# Patient Record
Sex: FEMALE | Race: WHITE | NOT HISPANIC OR LATINO | Employment: OTHER | URBAN - METROPOLITAN AREA
[De-identification: names, ages, dates, MRNs, and addresses within clinical notes are randomized per-mention and may not be internally consistent; named-entity substitution may affect disease eponyms.]

---

## 2017-01-23 ENCOUNTER — HOSPITAL ENCOUNTER (OUTPATIENT)
Dept: ULTRASOUND IMAGING | Facility: HOSPITAL | Age: 77
Discharge: HOME | End: 2017-01-23

## 2017-01-23 ENCOUNTER — HOSPITAL ENCOUNTER (OUTPATIENT)
Dept: MAMMOGRAPHY | Facility: HOSPITAL | Age: 77
Discharge: HOME | End: 2017-01-23
Admitting: FAMILY MEDICINE

## 2017-01-23 DIAGNOSIS — R92.8 ABNORMAL MAMMOGRAM: Primary | ICD-10-CM

## 2017-01-23 DIAGNOSIS — R92.8 ABNORMAL MAMMOGRAM: ICD-10-CM

## 2017-01-23 PROCEDURE — 76642 ULTRASOUND BREAST LIMITED: CPT

## 2017-01-23 PROCEDURE — G0206 DX MAMMO INCL CAD UNI: HCPCS

## 2017-02-01 ENCOUNTER — HOSPITAL ENCOUNTER (OUTPATIENT)
Dept: ULTRASOUND IMAGING | Facility: HOSPITAL | Age: 77
Discharge: HOME | End: 2017-02-01

## 2017-02-06 ENCOUNTER — HOSPITAL ENCOUNTER (OUTPATIENT)
Dept: ULTRASOUND IMAGING | Facility: HOSPITAL | Age: 77
Discharge: HOME | End: 2017-02-06
Admitting: FAMILY MEDICINE

## 2017-02-06 DIAGNOSIS — R92.8 ABNORMAL MAMMOGRAM: ICD-10-CM

## 2017-02-06 PROCEDURE — 88305 TISSUE EXAM BY PATHOLOGIST: CPT | Performed by: FAMILY MEDICINE

## 2017-02-06 RX ADMIN — SODIUM BICARBONATE 15 ML: 84 INJECTION, SOLUTION INTRAVENOUS at 10:42

## 2017-02-07 LAB
CYTO UR: NORMAL
LAB AP CASE REPORT: NORMAL
Lab: NORMAL
PATH REPORT.FINAL DX SPEC: NORMAL
PATH REPORT.GROSS SPEC: NORMAL

## 2017-05-08 DIAGNOSIS — I10 BENIGN ESSENTIAL HYPERTENSION: ICD-10-CM

## 2017-05-08 DIAGNOSIS — M51.36 LUMBAR DEGENERATIVE DISC DISEASE: ICD-10-CM

## 2017-05-08 DIAGNOSIS — E78.5 HYPERLIPIDEMIA: ICD-10-CM

## 2017-05-08 RX ORDER — HYDROCHLOROTHIAZIDE 25 MG/1
TABLET ORAL
Qty: 90 TABLET | Refills: 0 | OUTPATIENT
Start: 2017-05-08

## 2017-05-08 RX ORDER — DULOXETIN HYDROCHLORIDE 60 MG/1
CAPSULE, DELAYED RELEASE ORAL
Qty: 90 CAPSULE | Refills: 0 | OUTPATIENT
Start: 2017-05-08

## 2017-05-08 RX ORDER — ATORVASTATIN CALCIUM 80 MG/1
TABLET, FILM COATED ORAL
Qty: 90 TABLET | Refills: 0 | OUTPATIENT
Start: 2017-05-08

## 2017-05-08 RX ORDER — AMLODIPINE AND VALSARTAN 5; 320 MG/1; MG/1
TABLET ORAL
Qty: 90 TABLET | Refills: 0 | OUTPATIENT
Start: 2017-05-08

## 2017-05-27 ENCOUNTER — HOSPITAL ENCOUNTER (INPATIENT)
Facility: HOSPITAL | Age: 77
LOS: 4 days | Discharge: NURSING, REHAB OR LONG-TERM CARE | End: 2017-06-01
Attending: EMERGENCY MEDICINE | Admitting: INTERNAL MEDICINE

## 2017-05-27 ENCOUNTER — APPOINTMENT (OUTPATIENT)
Dept: GENERAL RADIOLOGY | Facility: HOSPITAL | Age: 77
End: 2017-05-27

## 2017-05-27 ENCOUNTER — APPOINTMENT (OUTPATIENT)
Dept: CT IMAGING | Facility: HOSPITAL | Age: 77
End: 2017-05-27

## 2017-05-27 DIAGNOSIS — M51.36 LUMBAR DEGENERATIVE DISC DISEASE: ICD-10-CM

## 2017-05-27 DIAGNOSIS — R73.9 HYPERGLYCEMIA: ICD-10-CM

## 2017-05-27 DIAGNOSIS — R26.2 DIFFICULTY WALKING: ICD-10-CM

## 2017-05-27 DIAGNOSIS — R53.1 GENERALIZED WEAKNESS: Primary | ICD-10-CM

## 2017-05-27 PROBLEM — M19.90 ARTHRITIS: Status: ACTIVE | Noted: 2017-05-27

## 2017-05-27 PROBLEM — M48.00 SPINAL STENOSIS: Status: ACTIVE | Noted: 2017-05-27

## 2017-05-27 PROBLEM — M41.9 SCOLIOSIS: Status: ACTIVE | Noted: 2017-05-27

## 2017-05-27 LAB
ALBUMIN SERPL-MCNC: 3.9 G/DL (ref 3.5–5.2)
ALBUMIN/GLOB SERPL: 1.1 G/DL
ALP SERPL-CCNC: 81 U/L (ref 39–117)
ALT SERPL W P-5'-P-CCNC: 25 U/L (ref 1–33)
ANION GAP SERPL CALCULATED.3IONS-SCNC: 13.4 MMOL/L
AST SERPL-CCNC: 29 U/L (ref 1–32)
BACTERIA UR QL AUTO: NORMAL /HPF
BASOPHILS # BLD AUTO: 0.09 10*3/MM3 (ref 0–0.2)
BASOPHILS NFR BLD AUTO: 1 % (ref 0–1.5)
BILIRUB SERPL-MCNC: 0.4 MG/DL (ref 0.1–1.2)
BILIRUB UR QL STRIP: NEGATIVE
BUN BLD-MCNC: 16 MG/DL (ref 8–23)
BUN/CREAT SERPL: 16.5 (ref 7–25)
CALCIUM SPEC-SCNC: 10 MG/DL (ref 8.6–10.5)
CHLORIDE SERPL-SCNC: 93 MMOL/L (ref 98–107)
CLARITY UR: CLEAR
CO2 SERPL-SCNC: 25.6 MMOL/L (ref 22–29)
COLOR UR: YELLOW
CREAT BLD-MCNC: 0.97 MG/DL (ref 0.57–1)
DEPRECATED RDW RBC AUTO: 45.8 FL (ref 37–54)
EOSINOPHIL # BLD AUTO: 0.2 10*3/MM3 (ref 0–0.7)
EOSINOPHIL NFR BLD AUTO: 2.2 % (ref 0.3–6.2)
ERYTHROCYTE [DISTWIDTH] IN BLOOD BY AUTOMATED COUNT: 14.5 % (ref 11.7–13)
GFR SERPL CREATININE-BSD FRML MDRD: 56 ML/MIN/1.73
GLOBULIN UR ELPH-MCNC: 3.7 GM/DL
GLUCOSE BLD-MCNC: 272 MG/DL (ref 65–99)
GLUCOSE BLDC GLUCOMTR-MCNC: 203 MG/DL (ref 70–130)
GLUCOSE UR STRIP-MCNC: ABNORMAL MG/DL
HBA1C MFR BLD: 14.75 % (ref 4.8–5.6)
HCT VFR BLD AUTO: 41.4 % (ref 35.6–45.5)
HGB BLD-MCNC: 13.6 G/DL (ref 11.9–15.5)
HGB UR QL STRIP.AUTO: NEGATIVE
HYALINE CASTS UR QL AUTO: NORMAL /LPF
IMM GRANULOCYTES # BLD: 0.02 10*3/MM3 (ref 0–0.03)
IMM GRANULOCYTES NFR BLD: 0.2 % (ref 0–0.5)
KETONES UR QL STRIP: NEGATIVE
LEUKOCYTE ESTERASE UR QL STRIP.AUTO: NEGATIVE
LYMPHOCYTES # BLD AUTO: 2.54 10*3/MM3 (ref 0.9–4.8)
LYMPHOCYTES NFR BLD AUTO: 28.1 % (ref 19.6–45.3)
MCH RBC QN AUTO: 28.4 PG (ref 26.9–32)
MCHC RBC AUTO-ENTMCNC: 32.9 G/DL (ref 32.4–36.3)
MCV RBC AUTO: 86.4 FL (ref 80.5–98.2)
MONOCYTES # BLD AUTO: 0.64 10*3/MM3 (ref 0.2–1.2)
MONOCYTES NFR BLD AUTO: 7.1 % (ref 5–12)
NEUTROPHILS # BLD AUTO: 5.54 10*3/MM3 (ref 1.9–8.1)
NEUTROPHILS NFR BLD AUTO: 61.4 % (ref 42.7–76)
NITRITE UR QL STRIP: NEGATIVE
PH UR STRIP.AUTO: 6 [PH] (ref 5–8)
PLATELET # BLD AUTO: 429 10*3/MM3 (ref 140–500)
PMV BLD AUTO: 9.7 FL (ref 6–12)
POTASSIUM BLD-SCNC: 4.4 MMOL/L (ref 3.5–5.2)
PROT SERPL-MCNC: 7.6 G/DL (ref 6–8.5)
PROT UR QL STRIP: ABNORMAL
RBC # BLD AUTO: 4.79 10*6/MM3 (ref 3.9–5.2)
RBC # UR: NORMAL /HPF
REF LAB TEST METHOD: NORMAL
SODIUM BLD-SCNC: 132 MMOL/L (ref 136–145)
SP GR UR STRIP: 1.02 (ref 1–1.03)
SQUAMOUS #/AREA URNS HPF: NORMAL /HPF
TROPONIN T SERPL-MCNC: <0.01 NG/ML (ref 0–0.03)
UROBILINOGEN UR QL STRIP: ABNORMAL
WBC NRBC COR # BLD: 9.03 10*3/MM3 (ref 4.5–10.7)
WBC UR QL AUTO: NORMAL /HPF

## 2017-05-27 PROCEDURE — G0378 HOSPITAL OBSERVATION PER HR: HCPCS

## 2017-05-27 PROCEDURE — 85025 COMPLETE CBC W/AUTO DIFF WBC: CPT | Performed by: EMERGENCY MEDICINE

## 2017-05-27 PROCEDURE — 82962 GLUCOSE BLOOD TEST: CPT

## 2017-05-27 PROCEDURE — 83036 HEMOGLOBIN GLYCOSYLATED A1C: CPT | Performed by: HOSPITALIST

## 2017-05-27 PROCEDURE — 71020 HC CHEST PA AND LATERAL: CPT

## 2017-05-27 PROCEDURE — 93005 ELECTROCARDIOGRAM TRACING: CPT | Performed by: EMERGENCY MEDICINE

## 2017-05-27 PROCEDURE — 80053 COMPREHEN METABOLIC PANEL: CPT | Performed by: EMERGENCY MEDICINE

## 2017-05-27 PROCEDURE — 84484 ASSAY OF TROPONIN QUANT: CPT | Performed by: EMERGENCY MEDICINE

## 2017-05-27 PROCEDURE — 81001 URINALYSIS AUTO W/SCOPE: CPT | Performed by: EMERGENCY MEDICINE

## 2017-05-27 PROCEDURE — 70450 CT HEAD/BRAIN W/O DYE: CPT

## 2017-05-27 PROCEDURE — 93010 ELECTROCARDIOGRAM REPORT: CPT | Performed by: INTERNAL MEDICINE

## 2017-05-27 PROCEDURE — 99284 EMERGENCY DEPT VISIT MOD MDM: CPT

## 2017-05-27 RX ORDER — HYDROCODONE BITARTRATE AND ACETAMINOPHEN 10; 325 MG/1; MG/1
1 TABLET ORAL EVERY 6 HOURS PRN
Status: DISCONTINUED | OUTPATIENT
Start: 2017-05-27 | End: 2017-06-01 | Stop reason: HOSPADM

## 2017-05-27 RX ORDER — ONDANSETRON 2 MG/ML
4 INJECTION INTRAMUSCULAR; INTRAVENOUS EVERY 6 HOURS PRN
Status: DISCONTINUED | OUTPATIENT
Start: 2017-05-27 | End: 2017-06-01 | Stop reason: HOSPADM

## 2017-05-27 RX ORDER — LORAZEPAM 1 MG/1
1 TABLET ORAL EVERY 6 HOURS PRN
Status: DISCONTINUED | OUTPATIENT
Start: 2017-05-27 | End: 2017-06-01 | Stop reason: HOSPADM

## 2017-05-27 RX ORDER — PHENOL 1.4 %
600 AEROSOL, SPRAY (ML) MUCOUS MEMBRANE 2 TIMES DAILY WITH MEALS
COMMUNITY
End: 2017-06-01 | Stop reason: HOSPADM

## 2017-05-27 RX ORDER — LORAZEPAM 2 MG/ML
1 INJECTION INTRAMUSCULAR EVERY 4 HOURS PRN
Status: DISCONTINUED | OUTPATIENT
Start: 2017-05-27 | End: 2017-06-01 | Stop reason: HOSPADM

## 2017-05-27 RX ORDER — DULOXETIN HYDROCHLORIDE 60 MG/1
60 CAPSULE, DELAYED RELEASE ORAL DAILY
Status: DISCONTINUED | OUTPATIENT
Start: 2017-05-28 | End: 2017-05-28

## 2017-05-27 RX ORDER — ACETAMINOPHEN 325 MG/1
650 TABLET ORAL EVERY 6 HOURS PRN
Status: DISCONTINUED | OUTPATIENT
Start: 2017-05-27 | End: 2017-06-01 | Stop reason: HOSPADM

## 2017-05-27 RX ORDER — NICOTINE POLACRILEX 4 MG
15 LOZENGE BUCCAL
Status: DISCONTINUED | OUTPATIENT
Start: 2017-05-27 | End: 2017-06-01 | Stop reason: HOSPADM

## 2017-05-27 RX ORDER — BISOPROLOL FUMARATE AND HYDROCHLOROTHIAZIDE 2.5; 6.25 MG/1; MG/1
1 TABLET ORAL
Status: DISCONTINUED | OUTPATIENT
Start: 2017-05-28 | End: 2017-05-28 | Stop reason: CLARIF

## 2017-05-27 RX ORDER — DEXTROSE MONOHYDRATE 25 G/50ML
25 INJECTION, SOLUTION INTRAVENOUS
Status: DISCONTINUED | OUTPATIENT
Start: 2017-05-27 | End: 2017-06-01 | Stop reason: HOSPADM

## 2017-05-27 RX ORDER — ATORVASTATIN CALCIUM 20 MG/1
80 TABLET, FILM COATED ORAL DAILY
Status: DISCONTINUED | OUTPATIENT
Start: 2017-05-28 | End: 2017-06-01 | Stop reason: HOSPADM

## 2017-05-27 RX ORDER — SODIUM CHLORIDE 0.9 % (FLUSH) 0.9 %
1-10 SYRINGE (ML) INJECTION AS NEEDED
Status: DISCONTINUED | OUTPATIENT
Start: 2017-05-27 | End: 2017-06-01 | Stop reason: HOSPADM

## 2017-05-27 RX ORDER — LEVOTHYROXINE SODIUM 0.12 MG/1
125 TABLET ORAL DAILY
Status: DISCONTINUED | OUTPATIENT
Start: 2017-05-28 | End: 2017-05-28

## 2017-05-28 ENCOUNTER — APPOINTMENT (OUTPATIENT)
Dept: MRI IMAGING | Facility: HOSPITAL | Age: 77
End: 2017-05-28

## 2017-05-28 PROBLEM — G45.0 VBI (VERTEBROBASILAR INSUFFICIENCY): Status: ACTIVE | Noted: 2017-05-28

## 2017-05-28 PROBLEM — I95.1 ORTHOSTATIC HYPOTENSION: Status: ACTIVE | Noted: 2017-05-28

## 2017-05-28 PROBLEM — E13.43: Status: ACTIVE | Noted: 2017-05-28

## 2017-05-28 LAB
ANION GAP SERPL CALCULATED.3IONS-SCNC: 15.4 MMOL/L
BASOPHILS # BLD AUTO: 0.06 10*3/MM3 (ref 0–0.2)
BASOPHILS NFR BLD AUTO: 0.7 % (ref 0–1.5)
BUN BLD-MCNC: 15 MG/DL (ref 8–23)
BUN/CREAT SERPL: 16.3 (ref 7–25)
CALCIUM SPEC-SCNC: 9.5 MG/DL (ref 8.6–10.5)
CHLORIDE SERPL-SCNC: 97 MMOL/L (ref 98–107)
CO2 SERPL-SCNC: 21.6 MMOL/L (ref 22–29)
CREAT BLD-MCNC: 0.92 MG/DL (ref 0.57–1)
DEPRECATED RDW RBC AUTO: 45.6 FL (ref 37–54)
EOSINOPHIL # BLD AUTO: 0.28 10*3/MM3 (ref 0–0.7)
EOSINOPHIL NFR BLD AUTO: 3.2 % (ref 0.3–6.2)
ERYTHROCYTE [DISTWIDTH] IN BLOOD BY AUTOMATED COUNT: 14.6 % (ref 11.7–13)
GFR SERPL CREATININE-BSD FRML MDRD: 59 ML/MIN/1.73
GLUCOSE BLD-MCNC: 228 MG/DL (ref 65–99)
GLUCOSE BLDC GLUCOMTR-MCNC: 242 MG/DL (ref 70–130)
GLUCOSE BLDC GLUCOMTR-MCNC: 245 MG/DL (ref 70–130)
GLUCOSE BLDC GLUCOMTR-MCNC: 247 MG/DL (ref 70–130)
GLUCOSE BLDC GLUCOMTR-MCNC: 403 MG/DL (ref 70–130)
HCT VFR BLD AUTO: 39.9 % (ref 35.6–45.5)
HGB BLD-MCNC: 13.1 G/DL (ref 11.9–15.5)
IMM GRANULOCYTES # BLD: 0.03 10*3/MM3 (ref 0–0.03)
IMM GRANULOCYTES NFR BLD: 0.3 % (ref 0–0.5)
LYMPHOCYTES # BLD AUTO: 2.35 10*3/MM3 (ref 0.9–4.8)
LYMPHOCYTES NFR BLD AUTO: 27 % (ref 19.6–45.3)
MCH RBC QN AUTO: 28.1 PG (ref 26.9–32)
MCHC RBC AUTO-ENTMCNC: 32.8 G/DL (ref 32.4–36.3)
MCV RBC AUTO: 85.4 FL (ref 80.5–98.2)
MONOCYTES # BLD AUTO: 0.63 10*3/MM3 (ref 0.2–1.2)
MONOCYTES NFR BLD AUTO: 7.2 % (ref 5–12)
NEUTROPHILS # BLD AUTO: 5.34 10*3/MM3 (ref 1.9–8.1)
NEUTROPHILS NFR BLD AUTO: 61.6 % (ref 42.7–76)
PLATELET # BLD AUTO: 408 10*3/MM3 (ref 140–500)
PMV BLD AUTO: 9.7 FL (ref 6–12)
POTASSIUM BLD-SCNC: 3.9 MMOL/L (ref 3.5–5.2)
RBC # BLD AUTO: 4.67 10*6/MM3 (ref 3.9–5.2)
SODIUM BLD-SCNC: 134 MMOL/L (ref 136–145)
T3FREE SERPL-MCNC: 0.56 PG/ML (ref 2–4.4)
T4 FREE SERPL-MCNC: 0.21 NG/DL (ref 0.93–1.7)
TSH SERPL DL<=0.05 MIU/L-ACNC: >100 MIU/ML (ref 0.27–4.2)
WBC NRBC COR # BLD: 8.69 10*3/MM3 (ref 4.5–10.7)

## 2017-05-28 PROCEDURE — 84439 ASSAY OF FREE THYROXINE: CPT | Performed by: HOSPITALIST

## 2017-05-28 PROCEDURE — 25010000002 LORAZEPAM PER 2 MG: Performed by: HOSPITALIST

## 2017-05-28 PROCEDURE — 97110 THERAPEUTIC EXERCISES: CPT

## 2017-05-28 PROCEDURE — 84443 ASSAY THYROID STIM HORMONE: CPT | Performed by: HOSPITALIST

## 2017-05-28 PROCEDURE — 99222 1ST HOSP IP/OBS MODERATE 55: CPT | Performed by: PSYCHIATRY & NEUROLOGY

## 2017-05-28 PROCEDURE — 63710000001 INSULIN DETEMER PER 5 UNITS: Performed by: HOSPITALIST

## 2017-05-28 PROCEDURE — 97161 PT EVAL LOW COMPLEX 20 MIN: CPT

## 2017-05-28 PROCEDURE — 72148 MRI LUMBAR SPINE W/O DYE: CPT

## 2017-05-28 PROCEDURE — 25010000002 HYDROCORTISONE SODIUM SUCCINATE 100 MG RECONSTITUTED SOLUTION: Performed by: INTERNAL MEDICINE

## 2017-05-28 PROCEDURE — 72146 MRI CHEST SPINE W/O DYE: CPT

## 2017-05-28 PROCEDURE — 82962 GLUCOSE BLOOD TEST: CPT

## 2017-05-28 PROCEDURE — 80048 BASIC METABOLIC PNL TOTAL CA: CPT | Performed by: HOSPITALIST

## 2017-05-28 PROCEDURE — 85025 COMPLETE CBC W/AUTO DIFF WBC: CPT | Performed by: HOSPITALIST

## 2017-05-28 PROCEDURE — 72141 MRI NECK SPINE W/O DYE: CPT

## 2017-05-28 PROCEDURE — 63710000001 INSULIN ASPART PER 5 UNITS: Performed by: INTERNAL MEDICINE

## 2017-05-28 PROCEDURE — 84481 FREE ASSAY (FT-3): CPT | Performed by: INTERNAL MEDICINE

## 2017-05-28 PROCEDURE — 63710000001 INSULIN ASPART PER 5 UNITS: Performed by: HOSPITALIST

## 2017-05-28 RX ORDER — LEVOTHYROXINE SODIUM ANHYDROUS 100 UG/5ML
100 INJECTION, POWDER, LYOPHILIZED, FOR SOLUTION INTRAVENOUS
Status: DISCONTINUED | OUTPATIENT
Start: 2017-05-29 | End: 2017-05-29

## 2017-05-28 RX ORDER — HYDROCHLOROTHIAZIDE 25 MG/1
6.25 TABLET ORAL DAILY
Status: DISCONTINUED | OUTPATIENT
Start: 2017-05-28 | End: 2017-06-01 | Stop reason: HOSPADM

## 2017-05-28 RX ORDER — LEVOTHYROXINE SODIUM ANHYDROUS 100 UG/5ML
240 INJECTION, POWDER, LYOPHILIZED, FOR SOLUTION INTRAVENOUS ONCE
Status: COMPLETED | OUTPATIENT
Start: 2017-05-28 | End: 2017-05-28

## 2017-05-28 RX ORDER — BISOPROLOL FUMARATE 5 MG/1
2.5 TABLET, FILM COATED ORAL
Status: DISCONTINUED | OUTPATIENT
Start: 2017-05-28 | End: 2017-06-01 | Stop reason: HOSPADM

## 2017-05-28 RX ADMIN — INSULIN ASPART 7 UNITS: 100 INJECTION, SOLUTION INTRAVENOUS; SUBCUTANEOUS at 12:30

## 2017-05-28 RX ADMIN — INSULIN ASPART 3 UNITS: 100 INJECTION, SOLUTION INTRAVENOUS; SUBCUTANEOUS at 03:46

## 2017-05-28 RX ADMIN — METFORMIN HYDROCHLORIDE 1000 MG: 1000 TABLET ORAL at 09:50

## 2017-05-28 RX ADMIN — LEVOTHYROXINE SODIUM 125 MCG: 125 TABLET ORAL at 09:51

## 2017-05-28 RX ADMIN — DULOXETINE HYDROCHLORIDE 60 MG: 60 CAPSULE, DELAYED RELEASE ORAL at 09:51

## 2017-05-28 RX ADMIN — HYDROCODONE BITARTRATE AND ACETAMINOPHEN 1 TABLET: 10; 325 TABLET ORAL at 04:03

## 2017-05-28 RX ADMIN — INSULIN ASPART 4 UNITS: 100 INJECTION, SOLUTION INTRAVENOUS; SUBCUTANEOUS at 21:30

## 2017-05-28 RX ADMIN — INSULIN DETEMIR 45 UNITS: 100 INJECTION, SOLUTION SUBCUTANEOUS at 10:23

## 2017-05-28 RX ADMIN — LEVOTHYROXINE SODIUM ANHYDROUS 240 MCG: 100 INJECTION, POWDER, LYOPHILIZED, FOR SOLUTION INTRAVENOUS at 17:38

## 2017-05-28 RX ADMIN — INSULIN ASPART 4 UNITS: 100 INJECTION, SOLUTION INTRAVENOUS; SUBCUTANEOUS at 17:36

## 2017-05-28 RX ADMIN — ATORVASTATIN CALCIUM 80 MG: 40 TABLET, FILM COATED ORAL at 09:51

## 2017-05-28 RX ADMIN — BISOPROLOL FUMARATE 2.5 MG: 5 TABLET ORAL at 09:51

## 2017-05-28 RX ADMIN — HYDROCORTISONE SODIUM SUCCINATE 50 MG: 100 INJECTION, POWDER, FOR SOLUTION INTRAMUSCULAR; INTRAVENOUS at 17:37

## 2017-05-28 RX ADMIN — METFORMIN HYDROCHLORIDE 1000 MG: 1000 TABLET ORAL at 17:37

## 2017-05-28 RX ADMIN — HYDROCHLOROTHIAZIDE 6.25 MG: 25 TABLET ORAL at 09:51

## 2017-05-28 RX ADMIN — LORAZEPAM 1 MG: 2 INJECTION, SOLUTION INTRAMUSCULAR; INTRAVENOUS at 07:48

## 2017-05-29 LAB
CORTIS SERPL-MCNC: 18.21 MCG/DL
GLUCOSE BLDC GLUCOMTR-MCNC: 159 MG/DL (ref 70–130)
GLUCOSE BLDC GLUCOMTR-MCNC: 231 MG/DL (ref 70–130)
GLUCOSE BLDC GLUCOMTR-MCNC: 241 MG/DL (ref 70–130)
GLUCOSE BLDC GLUCOMTR-MCNC: 290 MG/DL (ref 70–130)

## 2017-05-29 PROCEDURE — 99223 1ST HOSP IP/OBS HIGH 75: CPT | Performed by: INTERNAL MEDICINE

## 2017-05-29 PROCEDURE — 25010000002 HYDROCORTISONE SODIUM SUCCINATE 100 MG RECONSTITUTED SOLUTION: Performed by: INTERNAL MEDICINE

## 2017-05-29 PROCEDURE — 63710000001 INSULIN ASPART PER 5 UNITS: Performed by: INTERNAL MEDICINE

## 2017-05-29 PROCEDURE — 99231 SBSQ HOSP IP/OBS SF/LOW 25: CPT | Performed by: PSYCHIATRY & NEUROLOGY

## 2017-05-29 PROCEDURE — 63710000001 INSULIN DETEMER PER 5 UNITS: Performed by: HOSPITALIST

## 2017-05-29 PROCEDURE — 82533 TOTAL CORTISOL: CPT | Performed by: INTERNAL MEDICINE

## 2017-05-29 PROCEDURE — 82962 GLUCOSE BLOOD TEST: CPT

## 2017-05-29 PROCEDURE — 90791 PSYCH DIAGNOSTIC EVALUATION: CPT

## 2017-05-29 PROCEDURE — 97110 THERAPEUTIC EXERCISES: CPT | Performed by: PHYSICAL THERAPIST

## 2017-05-29 RX ORDER — LEVOTHYROXINE SODIUM ANHYDROUS 100 UG/5ML
112 INJECTION, POWDER, LYOPHILIZED, FOR SOLUTION INTRAVENOUS
Status: DISCONTINUED | OUTPATIENT
Start: 2017-05-30 | End: 2017-06-01

## 2017-05-29 RX ORDER — LIOTHYRONINE SODIUM 5 UG/1
10 TABLET ORAL 2 TIMES DAILY
Status: DISCONTINUED | OUTPATIENT
Start: 2017-05-29 | End: 2017-06-01

## 2017-05-29 RX ADMIN — INSULIN ASPART 8 UNITS: 100 INJECTION, SOLUTION INTRAVENOUS; SUBCUTANEOUS at 18:17

## 2017-05-29 RX ADMIN — DULOXETINE HYDROCHLORIDE 90 MG: 60 CAPSULE, DELAYED RELEASE ORAL at 09:09

## 2017-05-29 RX ADMIN — BISOPROLOL FUMARATE 2.5 MG: 5 TABLET ORAL at 09:09

## 2017-05-29 RX ADMIN — METFORMIN HYDROCHLORIDE 1000 MG: 1000 TABLET ORAL at 09:07

## 2017-05-29 RX ADMIN — ATORVASTATIN CALCIUM 80 MG: 40 TABLET, FILM COATED ORAL at 09:08

## 2017-05-29 RX ADMIN — HYDROCORTISONE SODIUM SUCCINATE 50 MG: 100 INJECTION, POWDER, FOR SOLUTION INTRAMUSCULAR; INTRAVENOUS at 22:12

## 2017-05-29 RX ADMIN — INSULIN ASPART 9 UNITS: 100 INJECTION, SOLUTION INTRAVENOUS; SUBCUTANEOUS at 18:16

## 2017-05-29 RX ADMIN — HYDROCORTISONE SODIUM SUCCINATE 50 MG: 100 INJECTION, POWDER, FOR SOLUTION INTRAMUSCULAR; INTRAVENOUS at 09:08

## 2017-05-29 RX ADMIN — HYDROCHLOROTHIAZIDE 6.25 MG: 25 TABLET ORAL at 09:09

## 2017-05-29 RX ADMIN — INSULIN DETEMIR 45 UNITS: 100 INJECTION, SOLUTION SUBCUTANEOUS at 09:08

## 2017-05-29 RX ADMIN — INSULIN ASPART 4 UNITS: 100 INJECTION, SOLUTION INTRAVENOUS; SUBCUTANEOUS at 09:08

## 2017-05-29 RX ADMIN — INSULIN ASPART 8 UNITS: 100 INJECTION, SOLUTION INTRAVENOUS; SUBCUTANEOUS at 12:25

## 2017-05-29 RX ADMIN — LIOTHYRONINE SODIUM 10 MCG: 5 TABLET ORAL at 18:16

## 2017-05-29 RX ADMIN — INSULIN ASPART 6 UNITS: 100 INJECTION, SOLUTION INTRAVENOUS; SUBCUTANEOUS at 12:26

## 2017-05-29 RX ADMIN — METFORMIN HYDROCHLORIDE 1000 MG: 1000 TABLET ORAL at 18:16

## 2017-05-29 RX ADMIN — INSULIN DETEMIR 45 UNITS: 100 INJECTION, SOLUTION SUBCUTANEOUS at 22:15

## 2017-05-29 RX ADMIN — HYDROCORTISONE SODIUM SUCCINATE 50 MG: 100 INJECTION, POWDER, FOR SOLUTION INTRAMUSCULAR; INTRAVENOUS at 01:29

## 2017-05-29 RX ADMIN — INSULIN ASPART 3 UNITS: 100 INJECTION, SOLUTION INTRAVENOUS; SUBCUTANEOUS at 22:13

## 2017-05-30 PROBLEM — Z91.199 NONCOMPLIANCE: Status: ACTIVE | Noted: 2017-05-30

## 2017-05-30 PROBLEM — E03.9 SEVERE HYPOTHYROIDISM: Status: ACTIVE | Noted: 2017-05-30

## 2017-05-30 LAB
25(OH)D3 SERPL-MCNC: 24.2 NG/ML (ref 30–100)
CHOLEST SERPL-MCNC: 283 MG/DL (ref 0–200)
GLUCOSE BLDC GLUCOMTR-MCNC: 161 MG/DL (ref 70–130)
GLUCOSE BLDC GLUCOMTR-MCNC: 202 MG/DL (ref 70–130)
GLUCOSE BLDC GLUCOMTR-MCNC: 258 MG/DL (ref 70–130)
GLUCOSE BLDC GLUCOMTR-MCNC: 267 MG/DL (ref 70–130)
HDLC SERPL-MCNC: 38 MG/DL (ref 40–60)
LDLC SERPL CALC-MCNC: 213 MG/DL (ref 0–100)
LDLC/HDLC SERPL: 5.62 {RATIO}
TRIGL SERPL-MCNC: 158 MG/DL (ref 0–150)
VLDLC SERPL-MCNC: 31.6 MG/DL (ref 5–40)

## 2017-05-30 PROCEDURE — 80061 LIPID PANEL: CPT | Performed by: INTERNAL MEDICINE

## 2017-05-30 PROCEDURE — 63710000001 INSULIN ASPART PER 5 UNITS: Performed by: INTERNAL MEDICINE

## 2017-05-30 PROCEDURE — 82306 VITAMIN D 25 HYDROXY: CPT | Performed by: INTERNAL MEDICINE

## 2017-05-30 PROCEDURE — 99233 SBSQ HOSP IP/OBS HIGH 50: CPT | Performed by: INTERNAL MEDICINE

## 2017-05-30 PROCEDURE — 25010000002 HYDROCORTISONE SODIUM SUCCINATE 100 MG RECONSTITUTED SOLUTION: Performed by: INTERNAL MEDICINE

## 2017-05-30 PROCEDURE — 82962 GLUCOSE BLOOD TEST: CPT

## 2017-05-30 PROCEDURE — 97110 THERAPEUTIC EXERCISES: CPT

## 2017-05-30 RX ORDER — PREGABALIN 75 MG/1
75 CAPSULE ORAL EVERY 12 HOURS SCHEDULED
Status: DISCONTINUED | OUTPATIENT
Start: 2017-05-30 | End: 2017-06-01 | Stop reason: HOSPADM

## 2017-05-30 RX ADMIN — HYDROCORTISONE SODIUM SUCCINATE 50 MG: 100 INJECTION, POWDER, FOR SOLUTION INTRAMUSCULAR; INTRAVENOUS at 09:21

## 2017-05-30 RX ADMIN — INSULIN ASPART 3 UNITS: 100 INJECTION, SOLUTION INTRAVENOUS; SUBCUTANEOUS at 21:49

## 2017-05-30 RX ADMIN — INSULIN ASPART 9 UNITS: 100 INJECTION, SOLUTION INTRAVENOUS; SUBCUTANEOUS at 09:22

## 2017-05-30 RX ADMIN — INSULIN ASPART 9 UNITS: 100 INJECTION, SOLUTION INTRAVENOUS; SUBCUTANEOUS at 13:48

## 2017-05-30 RX ADMIN — HYDROCORTISONE SODIUM SUCCINATE 25 MG: 100 INJECTION, POWDER, FOR SOLUTION INTRAMUSCULAR; INTRAVENOUS at 21:50

## 2017-05-30 RX ADMIN — METFORMIN HYDROCHLORIDE 1000 MG: 1000 TABLET ORAL at 09:20

## 2017-05-30 RX ADMIN — LIOTHYRONINE SODIUM 10 MCG: 5 TABLET ORAL at 18:35

## 2017-05-30 RX ADMIN — PREGABALIN 75 MG: 75 CAPSULE ORAL at 21:49

## 2017-05-30 RX ADMIN — INSULIN ASPART 8 UNITS: 100 INJECTION, SOLUTION INTRAVENOUS; SUBCUTANEOUS at 09:21

## 2017-05-30 RX ADMIN — LEVOTHYROXINE SODIUM ANHYDROUS 112 MCG: 100 INJECTION, POWDER, LYOPHILIZED, FOR SOLUTION INTRAVENOUS at 13:46

## 2017-05-30 RX ADMIN — ATORVASTATIN CALCIUM 80 MG: 40 TABLET, FILM COATED ORAL at 13:25

## 2017-05-30 RX ADMIN — INSULIN ASPART 8 UNITS: 100 INJECTION, SOLUTION INTRAVENOUS; SUBCUTANEOUS at 13:46

## 2017-05-30 RX ADMIN — LIOTHYRONINE SODIUM 10 MCG: 5 TABLET ORAL at 13:25

## 2017-05-30 RX ADMIN — BISOPROLOL FUMARATE 2.5 MG: 5 TABLET ORAL at 09:18

## 2017-05-30 RX ADMIN — HYDROCODONE BITARTRATE AND ACETAMINOPHEN 1 TABLET: 10; 325 TABLET ORAL at 09:25

## 2017-05-30 RX ADMIN — METFORMIN HYDROCHLORIDE 1000 MG: 1000 TABLET ORAL at 18:35

## 2017-05-30 RX ADMIN — DULOXETINE HYDROCHLORIDE 90 MG: 60 CAPSULE, DELAYED RELEASE ORAL at 09:19

## 2017-05-30 RX ADMIN — INSULIN ASPART 10 UNITS: 100 INJECTION, SOLUTION INTRAVENOUS; SUBCUTANEOUS at 18:35

## 2017-05-30 RX ADMIN — INSULIN ASPART 6 UNITS: 100 INJECTION, SOLUTION INTRAVENOUS; SUBCUTANEOUS at 18:36

## 2017-05-30 RX ADMIN — HYDROCHLOROTHIAZIDE 6.25 MG: 25 TABLET ORAL at 09:19

## 2017-05-31 LAB
GLUCOSE BLDC GLUCOMTR-MCNC: 140 MG/DL (ref 70–130)
GLUCOSE BLDC GLUCOMTR-MCNC: 199 MG/DL (ref 70–130)
GLUCOSE BLDC GLUCOMTR-MCNC: 203 MG/DL (ref 70–130)
GLUCOSE BLDC GLUCOMTR-MCNC: 226 MG/DL (ref 70–130)

## 2017-05-31 PROCEDURE — 25010000002 HYDROCORTISONE SODIUM SUCCINATE 100 MG RECONSTITUTED SOLUTION: Performed by: INTERNAL MEDICINE

## 2017-05-31 PROCEDURE — 99233 SBSQ HOSP IP/OBS HIGH 50: CPT | Performed by: INTERNAL MEDICINE

## 2017-05-31 PROCEDURE — 97110 THERAPEUTIC EXERCISES: CPT | Performed by: PHYSICAL THERAPIST

## 2017-05-31 PROCEDURE — 82962 GLUCOSE BLOOD TEST: CPT

## 2017-05-31 PROCEDURE — 63710000001 INSULIN ASPART PER 5 UNITS: Performed by: INTERNAL MEDICINE

## 2017-05-31 RX ORDER — MELATONIN
5000 DAILY
Status: DISCONTINUED | OUTPATIENT
Start: 2017-05-31 | End: 2017-06-01 | Stop reason: HOSPADM

## 2017-05-31 RX ORDER — HYDROCORTISONE 10 MG/1
40 TABLET ORAL 2 TIMES DAILY WITH MEALS
Status: DISCONTINUED | OUTPATIENT
Start: 2017-05-31 | End: 2017-05-31

## 2017-05-31 RX ORDER — HYDROCORTISONE 10 MG/1
40 TABLET ORAL 2 TIMES DAILY WITH MEALS
Status: DISCONTINUED | OUTPATIENT
Start: 2017-05-31 | End: 2017-06-01

## 2017-05-31 RX ADMIN — METFORMIN HYDROCHLORIDE 1000 MG: 1000 TABLET ORAL at 17:39

## 2017-05-31 RX ADMIN — VITAMIN D, TAB 1000IU (100/BT) 5000 UNITS: 25 TAB at 17:39

## 2017-05-31 RX ADMIN — INSULIN ASPART 12 UNITS: 100 INJECTION, SOLUTION INTRAVENOUS; SUBCUTANEOUS at 17:40

## 2017-05-31 RX ADMIN — INSULIN ASPART 3 UNITS: 100 INJECTION, SOLUTION INTRAVENOUS; SUBCUTANEOUS at 12:23

## 2017-05-31 RX ADMIN — LIOTHYRONINE SODIUM 10 MCG: 5 TABLET ORAL at 17:39

## 2017-05-31 RX ADMIN — DULOXETINE HYDROCHLORIDE 90 MG: 60 CAPSULE, DELAYED RELEASE ORAL at 08:28

## 2017-05-31 RX ADMIN — PREGABALIN 75 MG: 75 CAPSULE ORAL at 08:29

## 2017-05-31 RX ADMIN — INSULIN ASPART 12 UNITS: 100 INJECTION, SOLUTION INTRAVENOUS; SUBCUTANEOUS at 12:23

## 2017-05-31 RX ADMIN — HYDROCORTISONE SODIUM SUCCINATE 25 MG: 100 INJECTION, POWDER, FOR SOLUTION INTRAMUSCULAR; INTRAVENOUS at 08:29

## 2017-05-31 RX ADMIN — METFORMIN HYDROCHLORIDE 1000 MG: 1000 TABLET ORAL at 08:28

## 2017-05-31 RX ADMIN — INSULIN ASPART 6 UNITS: 100 INJECTION, SOLUTION INTRAVENOUS; SUBCUTANEOUS at 08:30

## 2017-05-31 RX ADMIN — LEVOTHYROXINE SODIUM ANHYDROUS 112 MCG: 100 INJECTION, POWDER, LYOPHILIZED, FOR SOLUTION INTRAVENOUS at 01:12

## 2017-05-31 RX ADMIN — LIOTHYRONINE SODIUM 10 MCG: 5 TABLET ORAL at 08:29

## 2017-05-31 RX ADMIN — BISOPROLOL FUMARATE 2.5 MG: 5 TABLET ORAL at 08:30

## 2017-05-31 RX ADMIN — PREGABALIN 75 MG: 75 CAPSULE ORAL at 23:21

## 2017-05-31 RX ADMIN — HYDROCORTISONE 40 MG: 10 TABLET ORAL at 17:39

## 2017-05-31 RX ADMIN — INSULIN ASPART 10 UNITS: 100 INJECTION, SOLUTION INTRAVENOUS; SUBCUTANEOUS at 08:31

## 2017-05-31 RX ADMIN — INSULIN ASPART 6 UNITS: 100 INJECTION, SOLUTION INTRAVENOUS; SUBCUTANEOUS at 17:40

## 2017-05-31 RX ADMIN — ATORVASTATIN CALCIUM 80 MG: 40 TABLET, FILM COATED ORAL at 08:28

## 2017-05-31 RX ADMIN — HYDROCHLOROTHIAZIDE 6.25 MG: 25 TABLET ORAL at 08:30

## 2017-06-01 VITALS
HEART RATE: 74 BPM | HEIGHT: 68 IN | DIASTOLIC BLOOD PRESSURE: 73 MMHG | SYSTOLIC BLOOD PRESSURE: 153 MMHG | TEMPERATURE: 98.3 F | RESPIRATION RATE: 18 BRPM | OXYGEN SATURATION: 94 % | WEIGHT: 187 LBS | BODY MASS INDEX: 28.34 KG/M2

## 2017-06-01 PROBLEM — E55.9 VITAMIN D DEFICIENCY DISEASE: Status: ACTIVE | Noted: 2017-06-01

## 2017-06-01 LAB
GLUCOSE BLDC GLUCOMTR-MCNC: 144 MG/DL (ref 70–130)
GLUCOSE BLDC GLUCOMTR-MCNC: 315 MG/DL (ref 70–130)
T3FREE SERPL-MCNC: 2.28 PG/ML (ref 2–4.4)
T4 FREE SERPL-MCNC: 0.88 NG/DL (ref 0.93–1.7)
TSH SERPL DL<=0.05 MIU/L-ACNC: 21.9 MIU/ML (ref 0.27–4.2)

## 2017-06-01 PROCEDURE — 84439 ASSAY OF FREE THYROXINE: CPT | Performed by: INTERNAL MEDICINE

## 2017-06-01 PROCEDURE — 82962 GLUCOSE BLOOD TEST: CPT

## 2017-06-01 PROCEDURE — 97110 THERAPEUTIC EXERCISES: CPT

## 2017-06-01 PROCEDURE — 63710000001 INSULIN ASPART PER 5 UNITS: Performed by: INTERNAL MEDICINE

## 2017-06-01 PROCEDURE — 84443 ASSAY THYROID STIM HORMONE: CPT | Performed by: INTERNAL MEDICINE

## 2017-06-01 PROCEDURE — 99233 SBSQ HOSP IP/OBS HIGH 50: CPT | Performed by: INTERNAL MEDICINE

## 2017-06-01 PROCEDURE — 84481 FREE ASSAY (FT-3): CPT | Performed by: INTERNAL MEDICINE

## 2017-06-01 RX ORDER — PREGABALIN 75 MG/1
75 CAPSULE ORAL EVERY 12 HOURS SCHEDULED
Start: 2017-06-01 | End: 2017-08-01

## 2017-06-01 RX ORDER — HYDROCORTISONE 10 MG/1
20 TABLET ORAL 2 TIMES DAILY WITH MEALS
Status: DISCONTINUED | OUTPATIENT
Start: 2017-06-01 | End: 2017-06-01 | Stop reason: HOSPADM

## 2017-06-01 RX ORDER — LEVOTHYROXINE SODIUM 0.12 MG/1
125 TABLET ORAL
Status: DISCONTINUED | OUTPATIENT
Start: 2017-06-02 | End: 2017-06-01 | Stop reason: HOSPADM

## 2017-06-01 RX ORDER — LEVOTHYROXINE SODIUM 0.12 MG/1
125 TABLET ORAL DAILY
Qty: 30 TABLET | Refills: 11 | Status: SHIPPED | OUTPATIENT
Start: 2017-06-01 | End: 2017-08-02 | Stop reason: SDUPTHER

## 2017-06-01 RX ORDER — HYDROCORTISONE 5 MG/1
TABLET ORAL
Start: 2017-06-01 | End: 2017-08-01

## 2017-06-01 RX ORDER — DULOXETIN HYDROCHLORIDE 30 MG/1
90 CAPSULE, DELAYED RELEASE ORAL DAILY
Start: 2017-06-01 | End: 2017-08-01 | Stop reason: SDUPTHER

## 2017-06-01 RX ORDER — BISOPROLOL FUMARATE 5 MG/1
2.5 TABLET, FILM COATED ORAL
Start: 2017-06-01 | End: 2017-06-21 | Stop reason: SDUPTHER

## 2017-06-01 RX ORDER — HYDROCORTISONE 20 MG/1
40 TABLET ORAL 2 TIMES DAILY WITH MEALS
Start: 2017-06-01 | End: 2017-06-01

## 2017-06-01 RX ORDER — NICOTINE POLACRILEX 4 MG
15 LOZENGE BUCCAL
Start: 2017-06-01 | End: 2017-12-19 | Stop reason: HOSPADM

## 2017-06-01 RX ORDER — LIOTHYRONINE SODIUM 5 UG/1
10 TABLET ORAL 2 TIMES DAILY
Start: 2017-06-01 | End: 2017-06-01 | Stop reason: HOSPADM

## 2017-06-01 RX ORDER — HYDROCHLOROTHIAZIDE 12.5 MG/1
6.25 TABLET ORAL DAILY
Start: 2017-06-01 | End: 2017-06-21 | Stop reason: SDUPTHER

## 2017-06-01 RX ADMIN — INSULIN ASPART 12 UNITS: 100 INJECTION, SOLUTION INTRAVENOUS; SUBCUTANEOUS at 12:44

## 2017-06-01 RX ADMIN — HYDROCORTISONE 40 MG: 10 TABLET ORAL at 08:53

## 2017-06-01 RX ADMIN — METFORMIN HYDROCHLORIDE 1000 MG: 1000 TABLET ORAL at 08:53

## 2017-06-01 RX ADMIN — HYDROCHLOROTHIAZIDE 6.25 MG: 25 TABLET ORAL at 08:53

## 2017-06-01 RX ADMIN — LIOTHYRONINE SODIUM 10 MCG: 5 TABLET ORAL at 08:53

## 2017-06-01 RX ADMIN — PREGABALIN 75 MG: 75 CAPSULE ORAL at 08:54

## 2017-06-01 RX ADMIN — INSULIN ASPART 12 UNITS: 100 INJECTION, SOLUTION INTRAVENOUS; SUBCUTANEOUS at 08:54

## 2017-06-01 RX ADMIN — BISOPROLOL FUMARATE 2.5 MG: 5 TABLET ORAL at 08:53

## 2017-06-01 RX ADMIN — DULOXETINE HYDROCHLORIDE 90 MG: 60 CAPSULE, DELAYED RELEASE ORAL at 10:15

## 2017-06-01 RX ADMIN — VITAMIN D, TAB 1000IU (100/BT) 5000 UNITS: 25 TAB at 08:52

## 2017-06-01 RX ADMIN — ATORVASTATIN CALCIUM 80 MG: 40 TABLET, FILM COATED ORAL at 08:52

## 2017-06-01 RX ADMIN — LEVOTHYROXINE SODIUM ANHYDROUS 112 MCG: 100 INJECTION, POWDER, LYOPHILIZED, FOR SOLUTION INTRAVENOUS at 10:15

## 2017-06-01 NOTE — DISCHARGE SUMMARY
[facility]    Date of Discharge:  6/1/2017    PCP: Napoleon Mead MD    Discharge Diagnosis:   Active Hospital Problems (** Indicates Principal Problem)    Diagnosis Date Noted   • **Severe hypothyroidism [E03.8] 05/30/2017   • Vitamin D deficiency disease [E55.9] 06/01/2017   • Noncompliance [Z91.19] 05/30/2017   • Orthostatic hypotension [I95.1] 05/28/2017   • 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 [E13.43] 05/28/2017   • Generalized weakness [R53.1] 05/27/2017   • Diabetes mellitus, type 2 [E11.9]    • Benign essential hypertension [I10] 08/20/2014   • Lumbar degenerative disc disease [M51.36] 07/05/2012      Resolved Hospital Problems    Diagnosis Date Noted Date Resolved   No resolved problems to display.      Consulting Physician(s)     Provider Relationship    Chaitanya Torres MD Consulting Physician           Ulisses Fulton MD    Hospital Course  Please see history and physical for details. Patient is a 76 y.o. female Admitted with generalized weakness. She did not have TIA/stroke, spine imaging was unremarkable. TSH was greater than 100 and so her severe hypothyroidism and possible myxedema coma/mental status change was the cause of her weakness. She stopped taking her medications including insulin due to stress from a friend dying a while back. She was started on IV levothyroxine as well as steroids and endocrinology was asked to see. They also help manage her diabetes. Endocrinology has transitioned her to by mouth medications and we are awaiting their final recommendations. She will need to go to rehabilitation because of deconditioning.    She did have a T7 lesion it was felt likely it was a hemangioma. Will need follow-up imaging, radiologist felt it was probably hemangioma. She also had indeterminate subcentimeter right renal lesion that showed have follow-up as well. Radiologist felt it could be a hemorrhagic or proteinaceous  cyst    The following are endocrinology recommendations:    Significant hypothyroidism -   Changed to oral levothyroxin-125 µg oral daily.  Counseled the patient on compliance of her thyroid replacement.  Will stop Cytomel  Will change hydrocortisone to 20 mg oral twice daily. Will slowly taper the patient off steroids in a period of one week.      Type 2 diabetes mellitus  DwA5c-cojzazjiimwa-24.29%  Steroids further complicating the blood sugar control.  Levemir to 60 units at bedtime  Increase NovoLog to 14 units with each meal  Will cover with NovoLog sliding scale 3 times a day before meals and at bedtime.  BG less than 150 - zero  151 - 200 - 3 unit  201 - 250 - 6 units  251 - 300 - 9 units  301 - 350 - 12 units  Above 350 mg/dl - 15 units.    Diabetic educator has been consulted.      Hyperlipidemia  Continue Lipitor 80 mg oral daily.     Vitamin D deficiency  On vitamin D replacement     Discharge instructions from endocrine  Follow-up with me in a period of 6-8 weeks.  Discharge medications as mentioned in the plan.     This steroid taper and plan  Hydrocortisone 20 mg oral twice daily today  Hydrocortisone 10 mg oral twice daily on 6/2/2017  Hydrocortisone 5 mg oral twice daily 6/3/2017 and then stop     Insulin regimen when patient is off steroids  Levemir 40 units at bedtime  NovoLog 8 units with each meal, holding parameters to hold if blood sugar is less than 100  NovoLog moderate dose sliding scale 3 times a day before meals and at bedtime    Condition on Discharge: Improved    Temp:  [97.3 °F (36.3 °C)-98.3 °F (36.8 °C)] 98.3 °F (36.8 °C)  Heart Rate:  [67-79] 74  Resp:  [18] 18  BP: (150-166)/(71-80) 153/73  Body mass index is 28.43 kg/(m^2).    Physical Exam   Constitutional: She is oriented to person, place, and time. No distress.   Cardiovascular: Normal rate and regular rhythm.    Pulmonary/Chest: Effort normal and breath sounds normal. No respiratory distress.   Abdominal: Soft. She exhibits no  distension. There is no tenderness.   Neurological: She is alert and oriented to person, place, and time.     Discharge Medications   Halie Guidry   Home Medication Instructions KETAN:[id number]    Printed on:06/01/17 1306   Medication Information                      atorvastatin (LIPITOR) 80 MG tablet  Take 1 tablet (80 mg total) by mouth daily             bisoprolol (ZEBeta) 5 MG tablet  Take 0.5 tablets by mouth Daily.             cholecalciferol 5000 UNITS tablet  Take 5,000 Units by mouth Daily.             dextrose (GLUTOSE) 40 % gel  Take 15 g by mouth Every 15 (Fifteen) Minutes As Needed for Low Blood Sugar (Blood < 70, Patient Alert, Is Not NPO & Can Safely Swallow).             DULoxetine (CYMBALTA) 30 MG capsule  Take 3 capsules by mouth Daily.             hydrochlorothiazide (HYDRODIURIL) 12.5 MG tablet  Take 0.5 tablets by mouth Daily.             hydrocortisone (CORTEF) 5 MG tablet  20 mg oral twice daily today, 10 mg oral twice daily on 6/2/2017, 5 mg oral twice daily 6/3/2017 and then stop             insulin aspart (novoLOG) 100 UNIT/ML injection  Inject 0-15 Units under the skin 4 (Four) Times a Day With Meals & at Bedtime.             insulin aspart (novoLOG) 100 UNIT/ML injection  Inject 12 Units under the skin 3 (Three) Times a Day With Meals.             insulin detemir (LEVEMIR) 100 UNIT/ML injection  Inject 50 Units under the skin Every Night.             levothyroxine (SYNTHROID, LEVOTHROID) 125 MCG tablet  Take 1 tablet by mouth Daily.             metFORMIN (GLUCOPHAGE) 1000 MG tablet  Take 1,000 mg by mouth 2 (two) times a day with meals.             pregabalin (LYRICA) 75 MG capsule  Take 1 capsule by mouth Every 12 (Twelve) Hours.                Diet Instructions     Diet:       Diet Texture / Consistency:  Regular   Common Modifiers:   Cardiac  Consistent Carbohydrate                   Activity Instructions     Activity as Tolerated       Per physical therapy                 Additional Instructions for the Follow-ups that You Need to Schedule     Call MD for problems / concerns.    As directed        US Renal Bilateral    Jun 06, 2017    Reason for Exam:  Follow-up right renal subcentimeter lesion       MRI Thoracic Spine Without Contrast    Sep 01, 2017    Reason for Exam:  Follow-up T7 lesion             Follow-up Information     Follow up with ADRIEN[facility] DE [facility] .    Specialties:  Skilled Nursing Facility, Intermediate Care Facility    Contact information:    [address] [facility] [id number] [phone]        Follow up with Solomon Barrow MD .    Specialty:  Endocrinology    Contact information:    [address] [address] [phone]          Follow up with Napoleon Mead MD .    Specialty:  Family Medicine    Contact information:    [address] [id number] [phone]            Efe Moses MD  06/01/17  1:01 PM    Discharge time spent greater than 30 minutes.

## 2017-06-01 NOTE — PLAN OF CARE
Problem: Patient Care Overview (Adult)  Goal: Plan of Care Review  Outcome: Ongoing (interventions implemented as appropriate)    06/01/17 0525   Coping/Psychosocial Response Interventions   Plan Of Care Reviewed With patient   Patient Care Overview   Progress improving   Outcome Evaluation   Outcome Summary/Follow up Plan States she is getting stronger. [facility]. Denies pain or discomfort. more thyroid labs today       Goal: Adult Individualization and Mutuality  Outcome: Ongoing (interventions implemented as appropriate)  Goal: Discharge Needs Assessment  Outcome: Ongoing (interventions implemented as appropriate)    Problem: Activity Intolerance (Adult)  Goal: Activity Tolerance  Outcome: Ongoing (interventions implemented as appropriate)  Goal: Effective Energy Conservation Techniques  Outcome: Ongoing (interventions implemented as appropriate)    Problem: Pain, Acute (Adult)  Goal: Acceptable Pain Control/Comfort Level  Outcome: Ongoing (interventions implemented as appropriate)    Problem: Skin Integrity Impairment, Risk/Actual (Adult)  Goal: Skin Integrity/Wound Healing  Outcome: Ongoing (interventions implemented as appropriate)

## 2017-06-01 NOTE — PLAN OF CARE
Problem: Patient Care Overview (Adult)  Goal: Plan of Care Review  Outcome: Ongoing (interventions implemented as appropriate)    06/01/17 1046   Coping/Psychosocial Response Interventions   Plan Of Care Reviewed With patient   Outcome Evaluation   Outcome Summary/Follow up Plan Pt with improved ambulation endurance but slightly impulsive while attempting to close gown standing at EOB and to turn to sit in chair. Pt required occasional cuing for improved sequencing w/ RWX. No other concerns noted.

## 2017-06-01 NOTE — PROGRESS NOTES
Continued Stay Note  [facility]     Patient Name: Halie Guidry  MRN: [id number]  Today's Date: 6/1/2017    Admit Date: 5/27/2017          Discharge Plan       06/01/17 1155    Case Management/Social Work Plan    Plan [facility] for short term rehab    Additional Comments Pt with discharge order, discharge clinical faxed to [facility] and Vannessa with [facility] notified of discharge and family to transport. Per DC summary waiting for Endocrin final recomendations, I have not notified pt's son re discharge due to waiting for endocrin to round.  30 Day PAS obtained and put in transfer packet              Discharge Codes     None        Expected Discharge Date and Time     Expected Discharge Date Expected Discharge Time    Jun 1, 2017             Deja Nguyen RN

## 2017-06-01 NOTE — THERAPY TREATMENT NOTE
Acute Care - Physical Therapy Treatment Note  [facility]     Patient Name: Halie Guidry  : 1940  MRN: [id number]  Today's Date: 2017             Admit Date: 2017    Visit Dx:    ICD-10-CM ICD-9-CM   1. Generalized weakness R53.1 780.79   2. Hyperglycemia R73.9 790.29   3. Difficulty walking R26.2 719.7     Patient Active Problem List   Diagnosis   • Compression fracture   • Lumbar degenerative disc disease   • Diabetes mellitus, type 2   • Hyperlipidemia   • Benign essential hypertension   • Hypothyroidism   • Leukocytosis   • Neuropathy   • Osteoporosis   • Proteinuria   • Tobacco abuse   • Diabetic eye exam   • Generalized weakness   • Spinal stenosis   • Scoliosis   • Arthritis   • VBI (vertebrobasilar insufficiency)   • Orthostatic hypotension   • 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   • Severe hypothyroidism   • Noncompliance               Adult Rehabilitation Note       17 1030 17 1040 17 1000    Rehab Assessment/Intervention    Discipline physical therapy assistant  -ANIYAH physical therapist  -KH physical therapy assistant  -CW    Document Type therapy note (daily note)  -ANIYAH therapy note (daily note)  - therapy note (daily note)  -CW    Subjective Information agree to therapy  -ANIYAH agree to therapy  - agree to therapy;complains of;weakness;fatigue;pain  -CW    Patient Effort, Rehab Treatment good  -ANIYAH good  - good  -CW    Symptoms Noted During/After Treatment  none  -KH fatigue  -CW    Precautions/Limitations fall precautions  -ANIYAH fall precautions  -KH fall precautions  -CW    Recorded by [ANIYAH] Bryant Conteh PTA [KH] Isaura Naidu, PT [CW] Xavi Danielle    Pain Assessment    Pain Assessment No/denies pain  -ANIYAH No/denies pain  - 0-10  -CW    Pain Score   7  -CW    Post Pain Score   7  -CW    Pain Location   Generalized  -CW    Recorded by [ANIYAH] Bryant Conteh PTA [KH] Isaura Naidu, PT [CW] Xavi Danielle    Cognitive  Assessment/Intervention    Current Cognitive/Communication Assessment functional  -ANIYAH functional  -KH functional  -CW    Orientation Status oriented x 4  -ANIYAH oriented x 4  -KH oriented x 4  -CW    Follows Commands/Answers Questions 75% of the time;able to follow single-step instructions  -ANIYAH 100% of the time  -% of the time  -CW    Personal Safety mild impairment;impulsive   somewhat impulsive while maintaining modesty & turn to chair  -ANIYAH WNL/WFL  -KH WNL/WFL  -CW    Personal Safety Interventions fall prevention program maintained;gait belt;nonskid shoes/slippers when out of bed  -ANIYAH gait belt;fall prevention program maintained  -KH fall prevention program maintained;gait belt;nonskid shoes/slippers when out of bed  -CW    Recorded by [ANIYAH] Bryant Conteh PTA [KH] Isaura Naidu, PT [CW] Xavi Danielle    Bed Mobility, Assessment/Treatment    Bed Mobility, Assistive Device bed rails;head of bed elevated  -ANIYAH bed rails  -KH     Bed Mob, Supine to Sit, [place] conditional independence  -ANIYAH conditional independence  -KH supervision required  -CW    Bed Mob, Sit to Supine, [place] not tested  -ANIYAH not tested  -KH contact guard assist  -CW    Bed Mobility, Impairments strength decreased;impaired balance  -ANIYAH      Recorded by [ANIYAH] Bryant Conteh PTA [KH] Isaura Naidu, PT [CW] Xavi Danielle    Transfer Assessment/Treatment    Transfers, Sit-Stand [place] contact guard assist;verbal cues required  -ANIYAH minimum assist (75% patient effort)  -KH minimum assist (75% patient effort);2 person assist required  -CW    Transfers, Stand-Sit [place] contact guard assist;verbal cues required;nonverbal cues required (demo/gesture)  -ANIYAH minimum assist (75% patient effort)  -KH minimum assist (75% patient effort);2 person assist required;verbal cues required  -CW    Transfers, Sit-Stand-Sit, Assist Device rolling walker  -ANIYAH rolling walker  -KH rolling walker  -CW    Transfer, Impairments  strength decreased;impaired balance  -ANIYAH      Recorded by [ANIYAH] Bryant Conteh PTA [KH] Isaura Naidu, PT [CW] Xavi Danielle    Gait Assessment/Treatment    Gait, [place] Level contact guard assist;verbal cues required  -ANIYAH contact guard assist;minimum assist (75% patient effort)  -KH minimum assist (75% patient effort)  -CW    Gait, Assistive Device rolling walker  -ANIYAH rolling walker  -KH rolling walker  -CW    Gait, Distance (Feet) 65  -ANIYAH 20  -KH 10  -CW    Gait, Gait Deviations tess decreased;decreased heel strike;forward flexed posture;step length decreased  -ANIYAH tess decreased;decreased heel strike;forward flexed posture;step length decreased  - tess decreased;step length decreased;stride length decreased;forward flexed posture  -CW    Gait, Safety Issues step length decreased  -ANIYAH      Gait, Impairments impaired balance;strength decreased  -ANIYAH      Gait, Comment  progressed to [facility]  -     Recorded by [ANIYAH] Bryant Conteh PTA [KH] Isaura Naidu, PT [CW] Xavi Danielle    Therapy Exercises    Bilateral Lower Extremities AROM:;15 reps;ankle pumps/circles;hip flexion;LAQ  -ANIYAH AROM:;15 reps;ankle pumps/circles;hip flexion;LAQ  -KH AROM:;15 reps;sitting;ankle pumps/circles;glut sets;hip abduction/adduction;LAQ;quad sets  -CW    Recorded by [ANIYAH] Bryant Conteh PTA [KH] Isaura Naidu, PT [CW] Xavi Danielle    Positioning and Restraints    Pre-Treatment Position in bed  -ANIYAH in bed  -KH in bed  -CW    Post Treatment Position chair  -ANIYAH chair  -KH bed  -CW    In Bed   notified nsg;supine;call light within reach;encouraged to call for assist;exit alarm on  -CW    In Chair sitting;call light within reach;encouraged to call for assist;exit alarm on  -ANIYAH reclined;call light within reach;encouraged to call for assist;exit alarm on  -KH     Recorded by [ANIYAH] Bryant Conteh PTA [KH] Isaura Naidu, PT [CW] Xavi Danielle      05/29/17 [facility]          Rehab  Assessment/Intervention    Discipline physical therapist  -CK      Document Type therapy note (daily note)  -CK      Subjective Information agree to therapy;complains of;weakness;fatigue  -CK      Patient Effort, Rehab Treatment good  -CK      Symptoms Noted During/After Treatment none  -CK      Precautions/Limitations fall precautions  -CK      Recorded by [CK] Pascual Georges, PT      Pain Assessment    Pain Assessment No/denies pain  -CK      Recorded by [CK] Pascual Georges, PT      Vision Assessment/Intervention    Visual Impairment WNL  -CK      Recorded by [CK] Pascual Georges, PT      Cognitive Assessment/Intervention    Current Cognitive/Communication Assessment functional  -CK      Orientation Status oriented x 4  -CK      Recorded by [CK] Pascual Georges, PT      Bed Mobility, Assessment/Treatment    Bed Mob, Supine to Sit, [place] minimum assist (75% patient effort);verbal cues required  -CK      Recorded by [CK] Pascual Georges, PT      Transfer Assessment/Treatment    Transfers, Sit-Stand [place] minimum assist (75% patient effort);2 person assist required  -CK      Transfers, Stand-Sit [place] minimum assist (75% patient effort);2 person assist required;verbal cues required  -CK      Transfers, Sit-Stand-Sit, Assist Device rolling walker  -CK      Recorded by [CK] Pascual Georges, PT      Gait Assessment/Treatment    Gait, [place] Level minimum assist (75% patient effort);2 person assist required;verbal cues required  -CK      Gait, Assistive Device rolling walker  -CK      Gait, Distance (Feet) 8  -CK      Gait, Gait Deviations tess decreased;decreased heel strike;forward flexed posture;step length decreased   knee hyperextension  -CK      Gait, Comment sidesteps EOB and then walked to chair  -CK      Recorded by [CK] Pascual Georges, PT      Therapy Exercises    Bilateral Lower Extremities AROM:;15 reps;sitting;ankle pumps/circles;glut sets;hip abduction/adduction;LAQ;quad sets  -CK       Recorded by [CK] Pascual Georges, PT      Positioning and Restraints    Pre-Treatment Position in bed  -CK      Post Treatment Position chair  -CK      In Chair reclined;sitting;call light within reach;exit alarm on  -CK      Recorded by [CK] Pascual Georges, PT        User Key  (r) = Recorded By, (t) = Taken By, (c) = Cosigned By    Initials Name Effective Dates    KH Isaura Naidu, PT 05/18/15 -     CK Pascual Georges, PT 04/24/15 -     ANIYAH Bryant Conteh, PTA 04/24/15 -     CW Xavi Danielle 12/13/16 -                 IP PT Goals       05/28/17 1526          Bed Mobility PT LTG    Bed Mobility PT LTG, Date Established 05/28/17  -RICKEY      Bed Mobility PT LTG, Time to Achieve 2 wks  -RICKEY      Bed Mobility PT LTG, Activity Type supine to sit/sit to supine  -RICKEY      Bed Mobility PT LTG, [place] Level supervision required  -RICKEY      Transfer Training PT LTG    Transfer Training PT LTG, Date Established 05/28/17  -RICKEY      Transfer Training PT LTG, Time to Achieve 2 wks  -RICKEY      Transfer Training PT LTG, Activity Type bed to chair /chair to bed;sit to stand/stand to sit  -RICKEY      Transfer Training PT LTG, [place] Level supervision required  -RICKEY      Transfer Training PT LTG, Assist Device walker, rolling  -RICKEY      Gait Training PT LTG    Gait Training Goal PT LTG, Date Established 05/28/17  -RICKEY      Gait Training Goal PT LTG, Time to Achieve 2 wks  -RICKEY      Gait Training Goal PT LTG, [place] Level supervision required  -RICKEY      Gait Training Goal PT LTG, Assist Device walker, rolling  -RICKEY        User Key  (r) = Recorded By, (t) = Taken By, (c) = Cosigned By    Initials Name Provider Type    RICKEY Mathias, PT Physical Therapist          Physical Therapy Education     Title: PT OT SLP Therapies (Done)     Topic: Physical Therapy (Done)     Point: Mobility training (Done)    Learning Progress Summary    Learner Readiness Method Response Comment Documented by Status   Patient Acceptance HALEY DILL  06/01/17 1045 Done    Acceptance E VU  KH 05/31/17 1042 Done    Acceptance TB,D,E DU,VU,NR  CW 05/30/17 1005 Done    Acceptance E VU,NR  RICKEY 05/28/17 1526 Done               Point: Home exercise program (Done)    Learning Progress Summary    Learner Readiness Method Response Comment Documented by Status   Patient Acceptance E VU  ANIYAH 06/01/17 1045 Done    Acceptance E VU  KH 05/31/17 1042 Done    Acceptance TB,D,E DU,VU,NR  CW 05/30/17 1005 Done    Acceptance E VU HEP given for exercises to perform while PT not in room. CK 05/29/17 1249 Done    Acceptance E VU,NR  RICKEY 05/28/17 1526 Done               Point: Body mechanics (Done)    Learning Progress Summary    Learner Readiness Method Response Comment Documented by Status   Patient Acceptance E VU  ANIYAH 06/01/17 1045 Done    Acceptance TB,D,E DU,VU,NR  CW 05/30/17 1005 Done               Point: Precautions (Done)    Learning Progress Summary    Learner Readiness Method Response Comment Documented by Status   Patient Acceptance E VU  ANIYAH 06/01/17 1045 Done    Acceptance TB,D,E DU,VU,NR  CW 05/30/17 1005 Done                      User Key     Initials Effective Dates Name Provider Type Discipline     05/18/15 -  Isaura Naidu, PT Physical Therapist PT    CK 04/24/15 -  Pascual Georges, PT Physical Therapist PT    RICKEY 10/06/15 -  Kenya Mathias, PT Physical Therapist PT    ANIYAH 04/24/15 -  Bryant Conteh, PTA Physical Therapy Assistant PT    CW 12/13/16 -  Xavi Danielle Physical Therapy Assistant PT                    PT Recommendation and Plan  Anticipated Discharge Disposition: home with home health, skilled nursing facility, other (see comments) (pending progress, at current level pt would need rehab.)  Planned Therapy Interventions: balance training, bed mobility training, gait training, home exercise program, patient/family education, strengthening, transfer training  PT Frequency: daily  Plan of Care Review  Plan Of Care Reviewed With: patient  Outcome  Summary/Follow up Plan: Pt with improved ambulation endurance but slightly impulsive while attempting to close gown standing at EOB and to turn to sit in chair.  Pt required occasional cuing for improved sequencing w/ RWX.  No other concerns noted.           Outcome Measures       06/01/17 1000 05/30/17 1000 05/29/17 1300    How much help from another person do you currently need...    Turning from your back to your side while in flat bed without using bedrails? 3  -ANIYAH 3  -CW 3  -CK    Moving from lying on back to sitting on the side of a flat bed without bedrails? 3  -ANIYAH 3  -CW 3  -CK    Moving to and from a bed to a chair (including a wheelchair)? 3  -ANIYAH 3  -CW 2  -CK    Standing up from a chair using your arms (e.g., wheelchair, bedside chair)? 3  -ANIYAH 3  -CW 2  -CK    Climbing 3-5 steps with a railing? 1  -ANIYAH 1  -CW 1  -CK    To walk in hospital room? 3  -ANIYAH 3  -CW 2  -CK    AM-PAC 6 Clicks Score 16  -ANIYAH 16  -CW 13  -CK    Functional Assessment    Outcome Measure Options AM-PAC 6 Clicks Basic Mobility (PT)  -ANIYAH AM-PAC 6 Clicks Basic Mobility (PT)  -CW AM-PAC 6 Clicks Basic Mobility (PT)  -CK      User Key  (r) = Recorded By, (t) = Taken By, (c) = Cosigned By    Initials Name Provider Type    CK Pascual Georges, PT Physical Therapist    ANIYAH Conteh PTA Physical Therapy Assistant    AMY Danielle Physical Therapy Assistant           Time Calculation:         PT Charges       06/01/17 1045          Time Calculation    Start Time 1030  -ANIYAH      Stop Time 1043  -ANIYAH      Time Calculation (min) 13 min  -ANIYAH      PT Received On 06/01/17  -ANIYAH      PT - Next Appointment 06/02/17  -ANIYAH        User Key  (r) = Recorded By, (t) = Taken By, (c) = Cosigned By    Initials Name Provider Type    ANIYAH Conteh PTA Physical Therapy Assistant          Therapy Charges for Today     Code Description Service Date Service Provider Modifiers Qty    [id number] HC PT THER PROC EA 15 MIN 6/1/2017 Bryant Conteh PTA GP 1          PT  G-Codes  Outcome Measure Options: AM-PAC 6 Clicks Basic Mobility (PT)    Bryant Conteh, PTA  6/1/2017

## 2017-06-01 NOTE — PROGRESS NOTES
76 y.o.   LOS: 4 days   Patient Care Team:  Napoleon Mead MD as PCP - General  Napoleon Mead MD as PCP - Family Medicine    Chief Complaint:  Hypothyroidism     Chief Complaint   Patient presents with   • Weakness - Generalized       Subjective   patient is being discharged to rehabilitation.  Energy levels significantly improved.  Thyroid function tests significantly improved.  TSH-21.9  Free T4-0.88  Free T3-2.28.    Will check continues to be elevated secondary to steroids.    Interval History:    Review of Systems:   Review of Systems   Constitutional: Negative for appetite change and fatigue.   Gastrointestinal: Negative for diarrhea.   Musculoskeletal: Positive for back pain and myalgias.   Neurological: Negative for weakness and numbness.     Objective     Vital Signs   Temp:  [97.3 °F (36.3 °C)-98.3 °F (36.8 °C)] 97.3 °F (36.3 °C)  Heart Rate:  [67-79] 79  Resp:  [18] 18  BP: (150-166)/(71-80) 166/80    Physical Exam:  Physical Exam   Constitutional: She is oriented to person, place, and time. She appears well-nourished.   HENT:   Head: Normocephalic and atraumatic.   Eyes: Conjunctivae and EOM are normal. No scleral icterus.   Neck: Normal range of motion. Neck supple. No thyromegaly present.   Acanthosis nigricans   Cardiovascular: Normal rate.  Exam reveals no friction rub.    Murmur heard.  Pulmonary/Chest: Effort normal and breath sounds normal. No stridor. She has no wheezes. She has no rales.   Abdominal: Soft. Bowel sounds are normal. She exhibits no distension. There is no tenderness.   Musculoskeletal: She exhibits no edema or tenderness.   Lymphadenopathy:     She has no cervical adenopathy.   Neurological: She is alert and oriented to person, place, and time. She has normal reflexes.   Decreased pin prick and intact proprioception   Skin: Skin is warm and dry. She is not diaphoretic.   Psychiatric: She has a normal mood and affect.   Vitals reviewed.  Results Review:     I reviewed the  patient's new clinical results.      No results found for: GLUCOSE  Lab Results (last 72 hours)     Procedure Component Value Units Date/Time    CBC & Differential [[id number]] Collected:  05/27/17 1747    Specimen:  Blood Updated:  05/27/17 1807    Narrative:       The following orders were created for panel order CBC & Differential.  Procedure                               Abnormality         Status                     ---------                               -----------         ------                     CBC Auto Differential[[id number]]         Abnormal            Final result                 Please view results for these tests on the individual orders.    CBC Auto Differential [[id number]]  (Abnormal) Collected:  05/27/17 1747    Specimen:  Blood Updated:  05/27/17 1807     WBC 9.03 10*3/mm3      RBC 4.79 10*6/mm3      Hemoglobin 13.6 g/dL      Hematocrit 41.4 %      MCV 86.4 fL      MCH 28.4 pg      MCHC 32.9 g/dL      RDW 14.5 (H) %      RDW-SD 45.8 fl      MPV 9.7 fL      Platelets 429 10*3/mm3      Neutrophil % 61.4 %      Lymphocyte % 28.1 %      Monocyte % 7.1 %      Eosinophil % 2.2 %      Basophil % 1.0 %      Immature Grans % 0.2 %      Neutrophils, Absolute 5.54 10*3/mm3      Lymphocytes, Absolute 2.54 10*3/mm3      Monocytes, Absolute 0.64 10*3/mm3      Eosinophils, Absolute 0.20 10*3/mm3      Basophils, Absolute 0.09 10*3/mm3      Immature Grans, Absolute 0.02 10*3/mm3     Urinalysis With / Culture If Indicated [[id number]]  (Abnormal) Collected:  05/27/17 1756    Specimen:  Urine from Urine, Clean Catch Updated:  05/27/17 1816     Color, UA Yellow     Appearance, UA Clear     pH, UA 6.0     Specific Gravity, UA 1.023     Glucose, UA >=1000 mg/dL (3+) (A)     Ketones, UA Negative     Bilirubin, UA Negative     Blood, UA Negative     Protein,  mg/dL (2+) (A)     Leuk Esterase, UA Negative     Nitrite, UA Negative     Urobilinogen, UA 0.2 E.U./dL    Comprehensive Metabolic Panel [[id number]]  (Abnormal)  Collected:  05/27/17 1747    Specimen:  Blood Updated:  05/27/17 1831     Glucose 272 (H) mg/dL      BUN 16 mg/dL      Creatinine 0.97 mg/dL      Sodium 132 (L) mmol/L      Potassium 4.4 mmol/L      Chloride 93 (L) mmol/L      CO2 25.6 mmol/L      Calcium 10.0 mg/dL      Total Protein 7.6 g/dL      Albumin 3.90 g/dL      ALT (SGPT) 25 U/L      AST (SGOT) 29 U/L      Alkaline Phosphatase 81 U/L      Total Bilirubin 0.4 mg/dL      eGFR Non African Amer 56 (L) mL/min/1.73      Globulin 3.7 gm/dL      A/G Ratio 1.1 g/dL      BUN/Creatinine Ratio 16.5     Anion Gap 13.4 mmol/L     Narrative:       The MDRD GFR formula is only valid for adults with stable renal function between ages 18 and 70.    Troponin [[id number]]  (Normal) Collected:  05/27/17 1747    Specimen:  Blood Updated:  05/27/17 1831     Troponin T <0.010 ng/mL     Narrative:       Troponin T Reference Ranges:  Less than 0.03 ng/mL:    Negative for AMI  0.03 to 0.09 ng/mL:      Indeterminant for AMI  Greater than 0.09 ng/mL: Positive for AMI    Urinalysis, Microscopic Only [[id number]] Collected:  05/27/17 1756    Specimen:  Urine from Urine, Clean Catch Updated:  05/27/17 1853     RBC, UA None Seen /HPF      WBC, UA None Seen /HPF      Bacteria, UA None Seen /HPF      Squamous Epithelial Cells, UA 0-2 /HPF      Hyaline Casts, UA None Seen /LPF      Methodology Manual Light Microscopy    POC Glucose Fingerstick [[id number]]  (Abnormal) Collected:  05/27/17 2142    Specimen:  Blood Updated:  05/27/17 2145     Glucose 203 (H) mg/dL     Narrative:       Meter: [id number] : [id number] Rigo Richards    Hemoglobin A1c [[id number]]  (Abnormal) Collected:  05/27/17 1747    Specimen:  Blood Updated:  05/27/17 2218     Hemoglobin A1C 14.75 (H) %     Narrative:       Hemoglobin A1C Ranges:    Increased Risk for Diabetes  5.7% to 6.4%  Diabetes                     >= 6.5%  Diabetic Goal                < 7.0%    POC Glucose Fingerstick [[id number]]  (Abnormal) Collected:   05/28/17 0550    Specimen:  Blood Updated:  05/28/17 0554     Glucose 245 (H) mg/dL     Narrative:       Meter: [id number] : [id number] Rigo Richards    CBC Auto Differential [[id number]]  (Abnormal) Collected:  05/28/17 0717    Specimen:  Blood Updated:  05/28/17 0736     WBC 8.69 10*3/mm3      RBC 4.67 10*6/mm3      Hemoglobin 13.1 g/dL      Hematocrit 39.9 %      MCV 85.4 fL      MCH 28.1 pg      MCHC 32.8 g/dL      RDW 14.6 (H) %      RDW-SD 45.6 fl      MPV 9.7 fL      Platelets 408 10*3/mm3      Neutrophil % 61.6 %      Lymphocyte % 27.0 %      Monocyte % 7.2 %      Eosinophil % 3.2 %      Basophil % 0.7 %      Immature Grans % 0.3 %      Neutrophils, Absolute 5.34 10*3/mm3      Lymphocytes, Absolute 2.35 10*3/mm3      Monocytes, Absolute 0.63 10*3/mm3      Eosinophils, Absolute 0.28 10*3/mm3      Basophils, Absolute 0.06 10*3/mm3      Immature Grans, Absolute 0.03 10*3/mm3     Basic Metabolic Panel [[id number]]  (Abnormal) Collected:  05/28/17 0717    Specimen:  Blood Updated:  05/28/17 0752     Glucose 228 (H) mg/dL      BUN 15 mg/dL      Creatinine 0.92 mg/dL      Sodium 134 (L) mmol/L      Potassium 3.9 mmol/L      Chloride 97 (L) mmol/L      CO2 21.6 (L) mmol/L      Calcium 9.5 mg/dL      eGFR Non African Amer 59 (L) mL/min/1.73      BUN/Creatinine Ratio 16.3     Anion Gap 15.4 mmol/L     Narrative:       The MDRD GFR formula is only valid for adults with stable renal function between ages 18 and 70.    T4, Free [[id number]]  (Abnormal) Collected:  05/28/17 0717    Specimen:  Blood Updated:  05/28/17 0803     Free T4 0.21 (L) ng/dL     TSH [[id number]]  (Abnormal) Collected:  05/28/17 0717    Specimen:  Blood Updated:  05/28/17 0833     TSH >100.000 (H) mIU/mL     POC Glucose Fingerstick [[id number]]  (Abnormal) Collected:  05/28/17 1200    Specimen:  Blood Updated:  05/28/17 1203     Glucose 403 (H) mg/dL     Narrative:       Meter: [id number] : [id number] Paul KILLIAN    T3, Free [[id number]]   (Abnormal) Collected:  05/28/17 0717    Specimen:  Blood Updated:  05/28/17 1550     T3, Free 0.56 (L) pg/mL     POC Glucose Fingerstick [[id number]]  (Abnormal) Collected:  05/28/17 1632    Specimen:  Blood Updated:  05/28/17 1633     Glucose 242 (H) mg/dL     Narrative:       Meter: [id number] : [id number] Joseph Ramirez    POC Glucose Fingerstick [[id number]]  (Abnormal) Collected:  05/28/17 2057    Specimen:  Blood Updated:  05/28/17 2102     Glucose 247 (H) mg/dL     Narrative:       Meter: [id number] : [id number] Rigo Bainima    POC Glucose Fingerstick [[id number]]  (Abnormal) Collected:  05/29/17 0558    Specimen:  Blood Updated:  05/29/17 0610     Glucose 231 (H) mg/dL     Narrative:       Meter: [id number] : [id number] Rigo Bainima    Cortisol [[id number]]  (Normal) Collected:  05/29/17 0539    Specimen:  Blood Updated:  05/29/17 0710     Cortisol 18.21 mcg/dL     Narrative:       Cortisol Reference Ranges:    Cortisol 6AM - 10AM Range: 6.02-18.40 mcg/dl  Cortisol 4PM - 8PM Range: 2.68-10.50 mcg/dl  Critical AM/PM:    Less than 2 mcg/dl    POC Glucose Fingerstick [[id number]]  (Abnormal) Collected:  05/29/17 1122    Specimen:  Blood Updated:  05/29/17 1127     Glucose 241 (H) mg/dL     Narrative:       Meter: [id number] : [id number] Steffi Early C.    POC Glucose Fingerstick [[id number]]  (Abnormal) Collected:  05/29/17 1708    Specimen:  Blood Updated:  05/29/17 1723     Glucose 290 (H) mg/dL     Narrative:       Meter: [id number] : [id number] Steffi Mylesa C.    POC Glucose Fingerstick [[id number]]  (Abnormal) Collected:  05/29/17 2147    Specimen:  Blood Updated:  05/29/17 2151     Glucose 159 (H) mg/dL     Narrative:       Meter: [id number] : [id number] Irineo Melissa M    POC Glucose Fingerstick [[id number]]  (Abnormal) Collected:  05/30/17 0556    Specimen:  Blood Updated:  05/30/17 0615     Glucose 258 (H) mg/dL     Narrative:       Meter: [id number] : [id number]  Irineo KILLIAN    Lipid Panel [[id number]]  (Abnormal) Collected:  05/30/17 0549    Specimen:  Blood Updated:  05/30/17 0721     Total Cholesterol 283 (H) mg/dL      Triglycerides 158 (H) mg/dL      HDL Cholesterol 38 (L) mg/dL      LDL Cholesterol  213 (H) mg/dL      VLDL Cholesterol 31.6 mg/dL      LDL/HDL Ratio 5.62    Narrative:       Cholesterol Reference Ranges  (U.S. Department of Health and Human Services ATP III Classifications)    Desirable          <200 mg/dL  Borderline High    200-239 mg/dL  High Risk          >240 mg/dL      Triglyceride Reference Ranges  (U.S. Department of Health and Human Services ATP III Classifications)    Normal           <150 mg/dL  Borderline High  150-199 mg/dL  High             200-499 mg/dL  Very High        >500 mg/dL    HDL Reference Ranges  (U.S. Department of Health and Human Services ATP III Classifcations)    Low     <40 mg/dl (major risk factor for CHD)  High    >60 mg/dl ('negative' risk factor for CHD)        LDL Reference Ranges  (U.S. Department of Health and Human Services ATP III Classifcations)    Optimal          <100 mg/dL  Near Optimal     100-129 mg/dL  Borderline High  130-159 mg/dL  High             160-189 mg/dL  Very High        >189 mg/dL    Vitamin D 25 Hydroxy [[id number]]  (Abnormal) Collected:  05/30/17 0549    Specimen:  Blood Updated:  05/30/17 0735     25 Hydroxy, Vitamin D 24.2 (L) ng/ml     Narrative:       Reference Range for Total Vitamin D 25(OH)     Deficiency    <20.0 ng/mL   Insufficiency 21-29 ng/mL   Sufficiency    ng/mL  Toxicity      >100 ng/ml         POC Glucose Fingerstick [[id number]]  (Abnormal) Collected:  05/30/17 1154    Specimen:  Blood Updated:  05/30/17 1200     Glucose 267 (H) mg/dL     Narrative:       Meter: [id number] : [id number] Day Meri        Imaging Results (last 72 hours)     Procedure Component Value Units Date/Time    CT Head Without Contrast [[id number]] Collected:  05/27/17 1908     Updated:   05/27/17 1944    Narrative:       CT SCAN OF THE BRAIN WITHOUT CONTRAST     HISTORY: Confusion and dizziness.     The CT scan was performed as an emergency procedure through the brain  without contrast. There is moderate diffuse atrophy and considerable  chronic small vessel ischemic change similar to the study of 08/17/2017.  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  There is no evidence of acute intracranial hemorrhage or mass effect and  the visualized sinuses are clear.     This report was finalized on 5/27/2017 7:41 PM by Dr. Elder Biggs MD.       XR Chest 2 View [[id number]] Collected:  05/27/17 2132     Updated:  05/27/17 2137    Narrative:       TWO-VIEW CHEST     HISTORY: Left-sided weakness. Hypertension.     The lungs are well-expanded and clear except for a calcified granuloma  in the right midlung. The heart size is normal and no acute abnormality  is seen. There is no change from 05/16/2016.     This report was finalized on 5/27/2017 9:34 PM by Dr. Elder Biggs MD.       MRI Cervical Spine Without Contrast [[id number]] Collected:  05/28/17 0944     Updated:  05/28/17 1332    Narrative:       MRI CERVICAL SPINE WO CONTRAST-, MRI LUMBAR SPINE WO CONTRAST-, MRI  THORACIC SPINE WO CONTRAST-     INDICATIONS: Leg weakness     TECHNIQUE: NONCONTRAST MRI OF THE CERVICAL, THORACIC, LUMBAR SPINE     COMPARISON: Lumbar MRI from 02/24/2015     FINDINGS:      A chronic fracture of L3 is noted. No new fractures are identified.  Endplate changes are seen in the lumbar and cervical spine. Anteriorly  in T7, T1 hypointense, T2 hyperintense focus measures 8 mm, possibly an  atypical hemangioma, but nonspecific, possibility of underlying lesion  such as neoplasm excluded, interval follow-up in 3 months can  characterize change.     Cord signal is normal. No intracanalicular collection or lesion is  identified on this unenhanced exam.     Reversed curvature cervical spine is noted. Mild,  3 mm retrolisthesis of  L4 on L5, mild, 3 mm retrolisthesis of L3 on L4. Alignment is otherwise  preserved.           Bilateral lateral renal cysts are seen. A T2 hypointense focus of the  right upper kidney, 5 mm on image 36 of series 6 is indeterminate, could  for example be a hemorrhagic or proteinaceous cyst, possibility of the  lesion not excluded, interval follow-up can characterize change.        Axial levels:     C2/3: No significant disc bulge, central or neural foraminal stenosis.     C3/4: Small endplate spurring slightly narrows the right neural  foramina. No significant central or left neuroforaminal stenosis.     C4/5: No significant disc bulge, central or neural foraminal stenosis.     C5/6: Minimal disc osteophyte complex, without significant central or  neural foraminal stenosis.     C6/7: Small disc osteophyte complex, mildly narrows the right neural  foramen, without significant central or left neural foraminal stenosis.     C7-T1: No significant disc bulge, central or foraminal stenosis.     T1/2: No significant disc bulge, central or neural foraminal stenosis.     T2/3: No significant disc bulge, central or neural foraminal stenosis.  Small perineural cyst is suggested.     T3/4: Small right paracentral disc bulge, without significant central or  neural foraminal stenosis.     T4/5: Slight central disc bulge, without central or neural foraminal  stenosis.     T5/6: No significant disc bulge, central or neural foraminal stenosis.     T6/7: Very small central/left paracentral disc bulge, without  significant central or neural foraminal stenosis.     T7/8: Minimal central disc bulge, without significant central or neural  foraminal stenosis.     T8/9: Tiny left paracentral disc bulge, without significant central  stenosis. Facet hypertrophy contributes to mild bilateral neuroforaminal  narrowing.     T9/10: No significant disc bulge, central or neural foraminal stenosis.     T10/11: No significant  disc bulge, central or left neural foraminal  stenosis. Facet hypertrophy contributes to minimal right neural  foraminal narrowing.     T11/12: A broad-based disc bulge slightly narrows the anterior thecal  space and facet and ligamentum flavum hypertrophy contribute to minimal  right neuroforaminal narrowing. No significant left neural foraminal  narrowing.     T12/L1: Mild broad-based disc bulge mildly narrows the anterior thecal  space. No significant neural foraminal narrowing.     L1/2: No significant disc bulge. Facet hypertrophy and endplate spurring  contribute to mild left more than right neural foraminal narrowing.     L2/3: No significant disc bulge or central stenosis. Facet ligament  flavum hypertrophy slightly narrowing the right neural foramen. No  significant left neuroforaminal narrowing.     L3/4: No significant disc bulge or central or neural foraminal stenosis.     L4/5: Mild broad-based disc bulge mildly narrows the anterior thecal  space and facet ligament flavum hypertrophy mildly narrow the posterior  thecal space, with resulting mild bilateral neural foraminal narrowing.     L5/S1: No significant disc bulge. Facet hypertrophy results in mild to  moderate right, no significant left neural foraminal narrowing.                      Impression:             1. Chronic fracture of L3. No acute fracture identified. Multilevel  spondyloarthropathy, as detailed above.        2. Indeterminate lesion of T7 vertebral body, follow-up advised.     3. Indeterminate subcentimeter right renal lesion, follow-up  recommended.        This report was finalized on 5/28/2017 1:29 PM by Dr. Mike Almonte MD.       MRI Lumbar Spine Without Contrast [[id number]] Collected:  05/28/17 0944     Updated:  05/28/17 1332    Narrative:       MRI CERVICAL SPINE WO CONTRAST-, MRI LUMBAR SPINE WO CONTRAST-, MRI  THORACIC SPINE WO CONTRAST-     INDICATIONS: Leg weakness     TECHNIQUE: NONCONTRAST MRI OF THE CERVICAL,  THORACIC, LUMBAR SPINE     COMPARISON: Lumbar MRI from 02/24/2015     FINDINGS:      A chronic fracture of L3 is noted. No new fractures are identified.  Endplate changes are seen in the lumbar and cervical spine. Anteriorly  in T7, T1 hypointense, T2 hyperintense focus measures 8 mm, possibly an  atypical hemangioma, but nonspecific, possibility of underlying lesion  such as neoplasm excluded, interval follow-up in 3 months can  characterize change.     Cord signal is normal. No intracanalicular collection or lesion is  identified on this unenhanced exam.     Reversed curvature cervical spine is noted. Mild, 3 mm retrolisthesis of  L4 on L5, mild, 3 mm retrolisthesis of L3 on L4. Alignment is otherwise  preserved.           Bilateral lateral renal cysts are seen. A T2 hypointense focus of the  right upper kidney, 5 mm on image 36 of series 6 is indeterminate, could  for example be a hemorrhagic or proteinaceous cyst, possibility of the  lesion not excluded, interval follow-up can characterize change.        Axial levels:     C2/3: No significant disc bulge, central or neural foraminal stenosis.     C3/4: Small endplate spurring slightly narrows the right neural  foramina. No significant central or left neuroforaminal stenosis.     C4/5: No significant disc bulge, central or neural foraminal stenosis.     C5/6: Minimal disc osteophyte complex, without significant central or  neural foraminal stenosis.     C6/7: Small disc osteophyte complex, mildly narrows the right neural  foramen, without significant central or left neural foraminal stenosis.     C7-T1: No significant disc bulge, central or foraminal stenosis.     T1/2: No significant disc bulge, central or neural foraminal stenosis.     T2/3: No significant disc bulge, central or neural foraminal stenosis.  Small perineural cyst is suggested.     T3/4: Small right paracentral disc bulge, without significant central or  neural foraminal stenosis.     T4/5: Slight  central disc bulge, without central or neural foraminal  stenosis.     T5/6: No significant disc bulge, central or neural foraminal stenosis.     T6/7: Very small central/left paracentral disc bulge, without  significant central or neural foraminal stenosis.     T7/8: Minimal central disc bulge, without significant central or neural  foraminal stenosis.     T8/9: Tiny left paracentral disc bulge, without significant central  stenosis. Facet hypertrophy contributes to mild bilateral neuroforaminal  narrowing.     T9/10: No significant disc bulge, central or neural foraminal stenosis.     T10/11: No significant disc bulge, central or left neural foraminal  stenosis. Facet hypertrophy contributes to minimal right neural  foraminal narrowing.     T11/12: A broad-based disc bulge slightly narrows the anterior thecal  space and facet and ligamentum flavum hypertrophy contribute to minimal  right neuroforaminal narrowing. No significant left neural foraminal  narrowing.     T12/L1: Mild broad-based disc bulge mildly narrows the anterior thecal  space. No significant neural foraminal narrowing.     L1/2: No significant disc bulge. Facet hypertrophy and endplate spurring  contribute to mild left more than right neural foraminal narrowing.     L2/3: No significant disc bulge or central stenosis. Facet ligament  flavum hypertrophy slightly narrowing the right neural foramen. No  significant left neuroforaminal narrowing.     L3/4: No significant disc bulge or central or neural foraminal stenosis.     L4/5: Mild broad-based disc bulge mildly narrows the anterior thecal  space and facet ligament flavum hypertrophy mildly narrow the posterior  thecal space, with resulting mild bilateral neural foraminal narrowing.     L5/S1: No significant disc bulge. Facet hypertrophy results in mild to  moderate right, no significant left neural foraminal narrowing.                      Impression:             1. Chronic fracture of L3. No  acute fracture identified. Multilevel  spondyloarthropathy, as detailed above.        2. Indeterminate lesion of T7 vertebral body, follow-up advised.     3. Indeterminate subcentimeter right renal lesion, follow-up  recommended.        This report was finalized on 5/28/2017 1:29 PM by Dr. Mike Almonte MD.       MRI Thoracic Spine Without Contrast [[id number]] Collected:  05/28/17 0944     Updated:  05/28/17 1332    Narrative:       MRI CERVICAL SPINE WO CONTRAST-, MRI LUMBAR SPINE WO CONTRAST-, MRI  THORACIC SPINE WO CONTRAST-     INDICATIONS: Leg weakness     TECHNIQUE: NONCONTRAST MRI OF THE CERVICAL, THORACIC, LUMBAR SPINE     COMPARISON: Lumbar MRI from 02/24/2015     FINDINGS:      A chronic fracture of L3 is noted. No new fractures are identified.  Endplate changes are seen in the lumbar and cervical spine. Anteriorly  in T7, T1 hypointense, T2 hyperintense focus measures 8 mm, possibly an  atypical hemangioma, but nonspecific, possibility of underlying lesion  such as neoplasm excluded, interval follow-up in 3 months can  characterize change.     Cord signal is normal. No intracanalicular collection or lesion is  identified on this unenhanced exam.     Reversed curvature cervical spine is noted. Mild, 3 mm retrolisthesis of  L4 on L5, mild, 3 mm retrolisthesis of L3 on L4. Alignment is otherwise  preserved.           Bilateral lateral renal cysts are seen. A T2 hypointense focus of the  right upper kidney, 5 mm on image 36 of series 6 is indeterminate, could  for example be a hemorrhagic or proteinaceous cyst, possibility of the  lesion not excluded, interval follow-up can characterize change.        Axial levels:     C2/3: No significant disc bulge, central or neural foraminal stenosis.     C3/4: Small endplate spurring slightly narrows the right neural  foramina. No significant central or left neuroforaminal stenosis.     C4/5: No significant disc bulge, central or neural foraminal stenosis.      C5/6: Minimal disc osteophyte complex, without significant central or  neural foraminal stenosis.     C6/7: Small disc osteophyte complex, mildly narrows the right neural  foramen, without significant central or left neural foraminal stenosis.     C7-T1: No significant disc bulge, central or foraminal stenosis.     T1/2: No significant disc bulge, central or neural foraminal stenosis.     T2/3: No significant disc bulge, central or neural foraminal stenosis.  Small perineural cyst is suggested.     T3/4: Small right paracentral disc bulge, without significant central or  neural foraminal stenosis.     T4/5: Slight central disc bulge, without central or neural foraminal  stenosis.     T5/6: No significant disc bulge, central or neural foraminal stenosis.     T6/7: Very small central/left paracentral disc bulge, without  significant central or neural foraminal stenosis.     T7/8: Minimal central disc bulge, without significant central or neural  foraminal stenosis.     T8/9: Tiny left paracentral disc bulge, without significant central  stenosis. Facet hypertrophy contributes to mild bilateral neuroforaminal  narrowing.     T9/10: No significant disc bulge, central or neural foraminal stenosis.     T10/11: No significant disc bulge, central or left neural foraminal  stenosis. Facet hypertrophy contributes to minimal right neural  foraminal narrowing.     T11/12: A broad-based disc bulge slightly narrows the anterior thecal  space and facet and ligamentum flavum hypertrophy contribute to minimal  right neuroforaminal narrowing. No significant left neural foraminal  narrowing.     T12/L1: Mild broad-based disc bulge mildly narrows the anterior thecal  space. No significant neural foraminal narrowing.     L1/2: No significant disc bulge. Facet hypertrophy and endplate spurring  contribute to mild left more than right neural foraminal narrowing.     L2/3: No significant disc bulge or central stenosis. Facet  ligament  flavum hypertrophy slightly narrowing the right neural foramen. No  significant left neuroforaminal narrowing.     L3/4: No significant disc bulge or central or neural foraminal stenosis.     L4/5: Mild broad-based disc bulge mildly narrows the anterior thecal  space and facet ligament flavum hypertrophy mildly narrow the posterior  thecal space, with resulting mild bilateral neural foraminal narrowing.     L5/S1: No significant disc bulge. Facet hypertrophy results in mild to  moderate right, no significant left neural foraminal narrowing.                      Impression:             1. Chronic fracture of L3. No acute fracture identified. Multilevel  spondyloarthropathy, as detailed above.        2. Indeterminate lesion of T7 vertebral body, follow-up advised.     3. Indeterminate subcentimeter right renal lesion, follow-up  recommended.        This report was finalized on 5/28/2017 1:29 PM by Dr. Mike Almonte MD.             Medication Review: done      Current Facility-Administered Medications:   •  acetaminophen (TYLENOL) tablet 650 mg, 650 mg, Oral, Q6H PRN, Yaya Arrieta MD  •  atorvastatin (LIPITOR) tablet 80 mg, 80 mg, Oral, Daily, Yaya Arrieta MD, 80 mg at 06/01/17 0852  •  bisoprolol (ZEBeta) tablet 2.5 mg, 2.5 mg, Oral, Q24H, 2.5 mg at 06/01/17 0853 **AND** hydrochlorothiazide (HYDRODIURIL) tablet 6.25 mg, 6.25 mg, Oral, Daily, Yaya Arrieta MD, 6.25 mg at 06/01/17 0853  •  cholecalciferol (VITAMIN D3) tablet 5,000 Units, 5,000 Units, Oral, Daily, Solomon Barrow MD, 5,000 Units at 06/01/17 0852  •  dextrose (D50W) solution 25 g, 25 g, Intravenous, Q15 Min PRN, Yaya Arrieta MD  •  dextrose (GLUTOSE) oral gel 15 g, 15 g, Oral, Q15 Min PRN, Yaya Arrieta MD  •  DULoxetine (CYMBALTA) DR capsule 90 mg, 90 mg, Oral, Daily, Franco Sevilla MD, 90 mg at 06/01/17 1015  •  glucagon (human recombinant) (GLUCAGEN DIAGNOSTIC) injection 1 mg, 1 mg, Subcutaneous, Q15 Min PRN, Yaya Arrieta,  MD  •  HYDROcodone-acetaminophen (NORCO)  MG per tablet 1 tablet, 1 tablet, Oral, Q6H PRN, Yaya Arrieta MD, 1 tablet at 05/30/17 0925  •  hydrocortisone (CORTEF) tablet 20 mg, 20 mg, Oral, BID With Meals, Solomon Barrow MD  •  insulin aspart (novoLOG) injection 0-15 Units, 0-15 Units, Subcutaneous, 4x Daily With Meals & Nightly, Solomon Barrow MD, 12 Units at 06/01/17 0854  •  insulin aspart (novoLOG) injection 12 Units, 12 Units, Subcutaneous, TID With Meals, Solomon Barrow MD, 12 Units at 06/01/17 0854  •  insulin detemir (LEVEMIR) injection 50 Units, 50 Units, Subcutaneous, Nightly, Solomon Barrow MD, 50 Units at 05/31/17 2131  •  [START ON 6/2/2017] levothyroxine (SYNTHROID, LEVOTHROID) tablet 125 mcg, 125 mcg, Oral, Q AM, Solomon Barrow MD  •  LORazepam (ATIVAN) injection 1 mg, 1 mg, Intravenous, Q4H PRN, Yaya Arrieta MD, 1 mg at 05/28/17 0748  •  LORazepam (ATIVAN) tablet 1 mg, 1 mg, Oral, Q6H PRN, Yaya Arrieta MD  •  metFORMIN (GLUCOPHAGE) tablet 1,000 mg, 1,000 mg, Oral, BID With Meals, Yaya Arrieta MD, 1,000 mg at 06/01/17 0853  •  ondansetron (ZOFRAN) injection 4 mg, 4 mg, Intravenous, Q6H PRN, Yaya Arrieta MD  •  pregabalin (LYRICA) capsule 75 mg, 75 mg, Oral, Q12H, Andrew Campbell MD, 75 mg at 06/01/17 0854  •  sodium chloride 0.9 % flush 1-10 mL, 1-10 mL, Intravenous, PRN, Yaya Arrieta MD    Assessment/Plan     Active Hospital Problems (** Indicates Principal Problem)    Diagnosis Date Noted   • **Severe hypothyroidism [E03.8] 05/30/2017   • Vitamin D deficiency disease [E55.9] 06/01/2017   • Noncompliance [Z91.19] 05/30/2017   • Orthostatic hypotension [I95.1] 05/28/2017   • 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 [E13.43] 05/28/2017   • Generalized weakness [R53.1] 05/27/2017   • Diabetes mellitus, type 2 [E11.9]    • Benign essential hypertension [I10] 08/20/2014   • Lumbar degenerative disc disease [M51.36] 07/05/2012      Resolved Hospital Problems    Diagnosis Date  "Noted Date Resolved   No resolved problems to display.     Significant hypothyroidism -   Changed to oral levothyroxin-125 µg oral daily.  Counseled the patient on compliance of her thyroid replacement.  Will stop Cytomel  Will change hydrocortisone to 20 mg oral twice daily.  Will slowly taper the patient off steroids in a period of one week.     Type 2 diabetes mellitus  [id number]-12.29%  Steroids further complicating the blood sugar control.  Levemir to 60 units at bedtime  Increase NovoLog to 14 units with each meal  Will cover with NovoLog sliding scale 3 times a day before meals and at bedtime.  Diabetic educator has been consulted.     Hyperlipidemia  Continue Lipitor 80 mg oral daily.    Vitamin D deficiency  On vitamin D replacement    Discharge instructions from endocrine  Follow-up with me in a period of 6-8 weeks.  Discharge medications as mentioned in the plan.    This steroid taper and plan  Hydrocortisone 20 mg oral twice daily today  Hydrocortisone 10 mg oral twice daily on 6/2/2017  Hydrocortisone 5 mg oral twice daily 6/3/2017 and then stop    Insulin regimen when patient is off steroids  Levemir 40 units at bedtime  NovoLog 8 units with each meal, holding parameters to hold if blood sugar is less than 100  NovoLog moderate dose sliding scale 3 times a day before meals and at bedtime.     Follow up with me in 8 weeks.   Discussed plan with RN    19 minutes out of 35 minutes face to face spent on floor managing care, discussing plan with nursing and counseling patient/family on treatment options, side effects of the medications.      Solomon Barrow MD.  06/01/17  2:16 PM      EMR Dragon / transcription disclaimer:    \"Dictated utilizing Dragon dictation\".   "

## 2017-06-05 NOTE — PROGRESS NOTES
Continued Stay Note  [facility]     Patient Name: Halie Guidry  MRN: [id number]  Today's Date: 6/5/2017    Admit Date: 5/27/2017          Discharge Plan       06/05/17 0734    Case Management/Social Work Plan    Additional Comments On 6/2/17 I had a reply email from Vannessa with [facility] that pt was admitted to skilled level of care    Final Note    Final Note  / [facility]              Discharge Codes     None        Expected Discharge Date and Time     Expected Discharge Date Expected Discharge Time    Jun 1, 2017             Deja Nguyen RN

## 2017-06-06 ENCOUNTER — TELEPHONE (OUTPATIENT)
Dept: ENDOCRINOLOGY | Age: 77
End: 2017-06-06

## 2017-06-06 NOTE — TELEPHONE ENCOUNTER
----- Message from Heydi Ortiz sent at 6/6/2017  2:33 PM EDT -----  Contact: WANDA WITH [facility] [phone]  WANDA STATES THAT THIS PATIENT IS AT THEIR FACILITY AFTER BEING IN THE HOSPITAL WHERE SHE SAW DR. CASE. SHE SAYS SHE HAS ORDERS FOR A RENAL ULTRASOUND AND WONDERS IF THIS IS SOMETHING THAT THEY ARE SUPPOSED TO PERFORM AT THEIR FACILITY. I LET HER KNOW THAT I DIDN'T SEE THE ORDERS IN [facility] AND WOULD HAVE YOU CALL AND INSTRUCT HER AS TO WHEN TO DO THAT SCAN.       Spoke with Wanda with [facility]   Let her know that Dr Case did not order a renal u/s for patient

## 2017-06-17 ENCOUNTER — HOSPITAL ENCOUNTER (OUTPATIENT)
Dept: ULTRASOUND IMAGING | Facility: HOSPITAL | Age: 77
End: 2017-06-17
Attending: HOSPITALIST

## 2017-06-20 ENCOUNTER — OFFICE VISIT (OUTPATIENT)
Dept: FAMILY MEDICINE CLINIC | Facility: CLINIC | Age: 77
End: 2017-06-20

## 2017-06-20 VITALS
BODY MASS INDEX: 27.43 KG/M2 | HEIGHT: 68 IN | RESPIRATION RATE: 20 BRPM | SYSTOLIC BLOOD PRESSURE: 138 MMHG | TEMPERATURE: 98.6 F | WEIGHT: 181 LBS | HEART RATE: 96 BPM | DIASTOLIC BLOOD PRESSURE: 86 MMHG

## 2017-06-20 DIAGNOSIS — E03.9 SEVERE HYPOTHYROIDISM: ICD-10-CM

## 2017-06-20 DIAGNOSIS — E11.59 TYPE 2 DIABETES MELLITUS WITH OTHER CIRCULATORY COMPLICATION, WITH LONG-TERM CURRENT USE OF INSULIN (HCC): Primary | ICD-10-CM

## 2017-06-20 DIAGNOSIS — Z79.4 TYPE 2 DIABETES MELLITUS WITH OTHER CIRCULATORY COMPLICATION, WITH LONG-TERM CURRENT USE OF INSULIN (HCC): Primary | ICD-10-CM

## 2017-06-20 PROCEDURE — 99213 OFFICE O/P EST LOW 20 MIN: CPT | Performed by: FAMILY MEDICINE

## 2017-06-20 NOTE — PROGRESS NOTES
Subjective   Halie Guidry is a 77 y.o. female.     CC: Hospital F/U for Severe Hypothyroidism, Uncontrolled DM, and Compression Fx.    History of Present Illness     Pt returns here after 5/17 admission to the hospital. That visit was as follows:    Hospital Course  Please see history and physical for details. Patient is a 76 y.o. female Admitted with generalized weakness. She did not have TIA/stroke, spine imaging was unremarkable. TSH was greater than 100 and so her severe hypothyroidism and possible myxedema coma/mental status change was the cause of her weakness. She stopped taking her medications including insulin due to stress from a friend dying a while back. She was started on IV levothyroxine as well as steroids and endocrinology was asked to see. They also help manage her diabetes. Endocrinology has transitioned her to by mouth medications and we are awaiting their final recommendations. She will need to go to rehabilitation because of deconditioning.     She did have a T7 lesion it was felt likely it was a hemangioma. Will need follow-up imaging, radiologist felt it was probably hemangioma. She also had indeterminate subcentimeter right renal lesion that showed have follow-up as well. Radiologist felt it could be a hemorrhagic or proteinaceous cyst     The following are endocrinology recommendations:     Significant hypothyroidism -   Changed to oral levothyroxin-125 µg oral daily.  Counseled the patient on compliance of her thyroid replacement.  Will stop Cytomel  Will change hydrocortisone to 20 mg oral twice daily. Will slowly taper the patient off steroids in a period of one week.      Type 2 diabetes mellitus  [id number]-20.29%  Steroids further complicating the blood sugar control.  Levemir to 60 units at bedtime  Increase NovoLog to 14 units with each meal  Will cover with NovoLog sliding scale 3 times a day before meals and at bedtime.  BG less than 150 - zero  151 - 200 - 3  unit  201 - 250 - 6 units  251 - 300 - 9 units  301 - 350 - 12 units  Above 350 mg/dl - 15 units.     Diabetic educator has been consulted.      Hyperlipidemia  Continue Lipitor 80 mg oral daily.      Vitamin D deficiency  On vitamin D replacement      Discharge instructions from endocrine  Follow-up with me in a period of 6-8 weeks.  Discharge medications as mentioned in the plan.      This steroid taper and plan  Hydrocortisone 20 mg oral twice daily today  Hydrocortisone 10 mg oral twice daily on 6/2/2017  Hydrocortisone 5 mg oral twice daily 6/3/2017 and then stop      Insulin regimen when patient is off steroids  Levemir 40 units at bedtime  NovoLog 8 units with each meal, holding parameters to hold if blood sugar is less than 100  NovoLog moderate dose sliding scale 3 times a day before meals and at bedtime     Condition on Discharge: Improved    She was d/c to [facility] for 2 weeks for PT and is home now getting [facility] for home PT. She sees endocrine, too.    Current medication list is compared to recent hospital d/c and the medications are reconciled.    The following portions of the patient's history were reviewed and updated as appropriate: allergies, current medications, past family history, past medical history, past social history, past surgical history and problem list.    Review of Systems   Constitutional: Negative for activity change, chills, fatigue and fever.   Respiratory: Negative for cough and chest tightness.    Cardiovascular: Negative for chest pain and palpitations.   Gastrointestinal: Negative for abdominal pain and nausea.   Endocrine: Negative for cold intolerance.   Psychiatric/Behavioral: Negative for behavioral problems and dysphoric mood.       Objective   Physical Exam   Constitutional: She appears well-developed and well-nourished.   Neck: Neck supple. No thyromegaly present.   Cardiovascular: Normal rate and regular rhythm.    No murmur heard.  Pulmonary/Chest: Effort  normal and breath sounds normal.   Abdominal: Bowel sounds are normal.   Psychiatric: She has a normal mood and affect. Her behavior is normal.   Nursing note and vitals reviewed.  Hospital records reviewed with pt confirming HPI.      Assessment/Plan   Halie was seen today for hypothyroidism and diabetes.    Diagnoses and all orders for this visit:    Type 2 diabetes mellitus with other circulatory complication, with long-term current use of insulin  -     Ambulatory Referral to Endocrinology    Severe hypothyroidism  -     Ambulatory Referral to Endocrinology    Pt to keep f/u with endocrinology for labs and adjustment.

## 2017-06-21 ENCOUNTER — TELEPHONE (OUTPATIENT)
Dept: FAMILY MEDICINE CLINIC | Facility: CLINIC | Age: 77
End: 2017-06-21

## 2017-06-21 RX ORDER — HYDROCHLOROTHIAZIDE 12.5 MG/1
6.25 TABLET ORAL DAILY
Qty: 30 TABLET | Refills: 11 | Status: SHIPPED | OUTPATIENT
Start: 2017-06-21 | End: 2017-09-14 | Stop reason: SDUPTHER

## 2017-06-21 RX ORDER — BISOPROLOL FUMARATE 5 MG/1
2.5 TABLET, FILM COATED ORAL
Qty: 30 TABLET | Refills: 11 | Status: SHIPPED | OUTPATIENT
Start: 2017-06-21 | End: 2017-09-14 | Stop reason: SDUPTHER

## 2017-06-21 NOTE — TELEPHONE ENCOUNTER
Ila with [facility] called stating the patient is needing refills on multiple medications. I spoke with Dr. Mead he approved the refills.

## 2017-06-24 ENCOUNTER — HOSPITAL ENCOUNTER (OUTPATIENT)
Dept: ULTRASOUND IMAGING | Facility: HOSPITAL | Age: 77
End: 2017-06-24
Attending: HOSPITALIST

## 2017-06-26 ENCOUNTER — TELEPHONE (OUTPATIENT)
Dept: FAMILY MEDICINE CLINIC | Facility: CLINIC | Age: 77
End: 2017-06-26

## 2017-08-01 ENCOUNTER — OFFICE VISIT (OUTPATIENT)
Dept: ENDOCRINOLOGY | Age: 77
End: 2017-08-01

## 2017-08-01 VITALS
BODY MASS INDEX: 28.64 KG/M2 | DIASTOLIC BLOOD PRESSURE: 82 MMHG | HEIGHT: 68 IN | OXYGEN SATURATION: 90 % | WEIGHT: 189 LBS | SYSTOLIC BLOOD PRESSURE: 138 MMHG | HEART RATE: 72 BPM

## 2017-08-01 DIAGNOSIS — E03.9 SEVERE HYPOTHYROIDISM: Primary | ICD-10-CM

## 2017-08-01 DIAGNOSIS — E78.49 OTHER HYPERLIPIDEMIA: ICD-10-CM

## 2017-08-01 DIAGNOSIS — Z79.4 TYPE 2 DIABETES MELLITUS WITH OTHER CIRCULATORY COMPLICATION, WITH LONG-TERM CURRENT USE OF INSULIN (HCC): ICD-10-CM

## 2017-08-01 DIAGNOSIS — E11.59 TYPE 2 DIABETES MELLITUS WITH OTHER CIRCULATORY COMPLICATION, WITH LONG-TERM CURRENT USE OF INSULIN (HCC): ICD-10-CM

## 2017-08-01 PROCEDURE — 99214 OFFICE O/P EST MOD 30 MIN: CPT | Performed by: INTERNAL MEDICINE

## 2017-08-01 PROCEDURE — 95250 CONT GLUC MNTR PHYS/QHP EQP: CPT | Performed by: INTERNAL MEDICINE

## 2017-08-01 RX ORDER — DULOXETIN HYDROCHLORIDE 30 MG/1
90 CAPSULE, DELAYED RELEASE ORAL DAILY
Start: 2017-08-01 | End: 2017-08-04 | Stop reason: SDUPTHER

## 2017-08-01 NOTE — PROGRESS NOTES
77 y.o.    Patient Care Team:  Napoleon Mead MD as PCP - General  Napoleon Mead MD as PCP - Family Medicine    Chief Complaint:    Hospital Follow Up/Hypothyroidism  Diabetes Mellitus  Subjective     HPI     77-year-old white female with past medical history of type 2 diabetes mellitus, hyperlipidemia, hypothyroidism is here as a follow up.  Patient was seen by me during her hospitalization in May 2017 when she got hospitalized with generalized weakness to the extent that she was not able to walk and get out of bed.  Thyroid function tests on the day of the admission showed TSH above 100, free T4-0.21.  Patient received IV levothyroxin for a few days during the hospitalization and then was transitioned to oral.  She was also started on hydrocortisone IV and was discharged on oral hydrocortisone with instructions for taper.    Today in the clinic patient reports that she is taking her levothyroxin 125 µg oral daily.  She has been extremely stressed as she has been fasting since morning, went to the hospital and walked all the way from there to the clinic and also lost her way coming here.     Type 2 diabetes mellitus  Currently on levemir 54 units at bedtime and NovoLog 12 units with each meal.  Reports that she is checking her blood sugars at least 3-4 times a day.  Since that her blood sugars are extremely variable ranging from  mg/dL.  Complains of increased urination and thirst.  Last eye exam was about a year ago and no history of diabetic retinopathy.  Complains of tingling and numbness in her bilateral feet.  No prior history of CAD, CVA, CK D.      Hyperlipidemia  Takes Lipitor 80 mg oral daily.    Of note patient was not entirely sure of her medication list.      Interval History      The following portions of the patient's history were reviewed and updated as appropriate: allergies, current medications, past family history, past medical history, past social history, past surgical history and  problem list.    Past Medical History:   Diagnosis Date   • Arthritis    • Benign essential hypertension 08/20/2014   • Bleeding disorder    • Diabetes mellitus, type 2    • Disc degeneration, lumbar    • Hyperlipidemia    • Hypothyroidism    • Neuropathy    • Osteoporosis 09/09/2015   • Rotator cuff tear, left    • Scoliosis    • Shoulder pain     LEFT, TORN ROTATOR CUFF S/P FALL   • Spinal stenosis      Family History   Problem Relation Age of Onset   • Bone cancer Mother    • No Known Problems Father      Social History     Social History   • Marital status:      Spouse name: N/A   • Number of children: N/A   • Years of education: N/A     Occupational History   • Not on file.     Social History Main Topics   • Smoking status: Former Smoker     Packs/day: 1.00     Quit date: 2013   • Smokeless tobacco: Never Used   • Alcohol use No   • Drug use: No   • Sexual activity: Defer     Other Topics Concern   • Not on file     Social History Narrative     Allergies   Allergen Reactions   • Sulfa Antibiotics        Current Outpatient Prescriptions:   •  atorvastatin (LIPITOR) 80 MG tablet, Take 1 tablet (80 mg total) by mouth daily, Disp: 90 tablet, Rfl: 1  •  bisoprolol (ZEBeta) 5 MG tablet, Take 0.5 tablets by mouth Daily., Disp: 30 tablet, Rfl: 11  •  cholecalciferol 5000 UNITS tablet, Take 5,000 Units by mouth Daily., Disp: , Rfl:   •  DULoxetine (CYMBALTA) 30 MG capsule, Take 3 capsules by mouth Daily., Disp: , Rfl:   •  hydrochlorothiazide (HYDRODIURIL) 12.5 MG tablet, Take 0.5 tablets by mouth Daily., Disp: 30 tablet, Rfl: 11  •  insulin aspart (novoLOG) 100 UNIT/ML injection, Inject 12 Units under the skin 3 (Three) Times a Day With Meals., Disp: , Rfl: 12  •  insulin aspart (novoLOG) 100 UNIT/ML injection, Inject 8 Units under the skin 3 (Three) Times a Day Before Meals., Disp: 2160 Units, Rfl: 0  •  insulin detemir (LEVEMIR) 100 UNIT/ML injection, Inject 50 Units under the skin Every Night., Disp: 15 pen,  "Rfl: 0  •  levothyroxine (SYNTHROID, LEVOTHROID) 125 MCG tablet, Take 1 tablet by mouth Daily., Disp: 30 tablet, Rfl: 11  •  metFORMIN (GLUCOPHAGE) 1000 MG tablet, Take 1,000 mg by mouth 2 (two) times a day with meals., Disp: , Rfl:   •  dextrose (GLUTOSE) 40 % gel, Take 15 g by mouth Every 15 (Fifteen) Minutes As Needed for Low Blood Sugar (Blood < 70, Patient Alert, Is Not NPO & Can Safely Swallow)., Disp: , Rfl:   •  hydrocortisone (CORTEF) 5 MG tablet, 20 mg oral twice daily today, 10 mg oral twice daily on 6/2/2017, 5 mg oral twice daily 6/3/2017 and then stop, Disp: , Rfl:         Review of Systems   Constitutional: Negative for fever.   HENT: Negative for facial swelling, nosebleeds, trouble swallowing and voice change.    Eyes: Negative for pain and redness.   Respiratory: Negative for shortness of breath and wheezing.    Cardiovascular: Negative for palpitations and leg swelling.   Gastrointestinal: Negative for abdominal pain, diarrhea and vomiting.   Endocrine: Positive for polydipsia. Negative for polyuria.   Genitourinary: Positive for decreased urine volume. Negative for frequency.   Musculoskeletal: Negative for joint swelling and neck pain.   Skin: Negative for rash.   Allergic/Immunologic: Negative for immunocompromised state.   Neurological: Negative for seizures and facial asymmetry.   Hematological: Bruises/bleeds easily.   Psychiatric/Behavioral: Positive for confusion. Negative for agitation.       Objective       Vitals:    08/01/17 1418   BP: 138/82   Pulse: 72   SpO2: 90%   Weight: 189 lb (85.7 kg)   Height: 68\" (172.7 cm)     Body mass index is 28.74 kg/(m^2).      Physical Exam   Constitutional: She is oriented to person, place, and time. She appears well-nourished.   Obese,uses a walker   HENT:   Head: Normocephalic and atraumatic.   Eyes: Conjunctivae and EOM are normal. No scleral icterus.   Neck: Normal range of motion. Neck supple. No thyromegaly present.   Acanthosis nigricans "   Cardiovascular: Normal rate and normal heart sounds.  Exam reveals no friction rub.    No murmur heard.  HR - 72   Pulmonary/Chest: Effort normal and breath sounds normal. No stridor. She has no wheezes. She has no rales.   Abdominal: Soft. Bowel sounds are normal. She exhibits no distension. There is no tenderness.   Central obesity, no lipodystrophy   Musculoskeletal: She exhibits edema. She exhibits no tenderness.   Lymphadenopathy:     She has no cervical adenopathy.   Neurological: She is alert and oriented to person, place, and time.   Uses a walker   Skin: Skin is warm and dry. She is not diaphoretic.   Psychiatric: She has a normal mood and affect.   Vitals reviewed.    Results Review:     I reviewed the patient's new clinical results.    Medical records reviewed  Summary: done      Admission on 05/27/2017, Discharged on 06/01/2017   Component Date Value Ref Range Status   • Glucose 05/27/2017 272* 65 - 99 mg/dL Final   • BUN 05/27/2017 16  8 - 23 mg/dL Final   • Creatinine 05/27/2017 0.97  0.57 - 1.00 mg/dL Final   • Sodium 05/27/2017 132* 136 - 145 mmol/L Final   • Potassium 05/27/2017 4.4  3.5 - 5.2 mmol/L Final   • Chloride 05/27/2017 93* 98 - 107 mmol/L Final   • CO2 05/27/2017 25.6  22.0 - 29.0 mmol/L Final   • Calcium 05/27/2017 10.0  8.6 - 10.5 mg/dL Final   • Total Protein 05/27/2017 7.6  6.0 - 8.5 g/dL Final   • Albumin 05/27/2017 3.90  3.50 - 5.20 g/dL Final   • ALT (SGPT) 05/27/2017 25  1 - 33 U/L Final   • AST (SGOT) 05/27/2017 29  1 - 32 U/L Final   • Alkaline Phosphatase 05/27/2017 81  39 - 117 U/L Final   • Total Bilirubin 05/27/2017 0.4  0.1 - 1.2 mg/dL Final   • eGFR Non  Amer 05/27/2017 56* >60 mL/min/1.73 Final   • Globulin 05/27/2017 3.7  gm/dL Final   • A/G Ratio 05/27/2017 1.1  g/dL Final   • BUN/Creatinine Ratio 05/27/2017 16.5  7.0 - 25.0 Final   • Anion Gap 05/27/2017 13.4  mmol/L Final   • Color, UA 05/27/2017 Yellow  Yellow, Straw Final   • Appearance, UA 05/27/2017 Clear   Clear Final   • pH, UA 05/27/2017 6.0  5.0 - 8.0 Final   • Specific Gravity, UA 05/27/2017 1.023  1.005 - 1.030 Final   • Glucose, UA 05/27/2017 >=1000 mg/dL (3+)* Negative Final   • Ketones, UA 05/27/2017 Negative  Negative Final   • Bilirubin, UA 05/27/2017 Negative  Negative Final   • Blood, UA 05/27/2017 Negative  Negative Final   • Protein, UA 05/27/2017 100 mg/dL (2+)* Negative Final   • Leuk Esterase, UA 05/27/2017 Negative  Negative Final   • Nitrite, UA 05/27/2017 Negative  Negative Final   • Urobilinogen, UA 05/27/2017 0.2 E.U./dL  0.2 - 1.0 E.U./dL Final   • Troponin T 05/27/2017 <0.010  0.000 - 0.030 ng/mL Final   • WBC 05/27/2017 9.03  4.50 - 10.70 10*3/mm3 Final   • RBC 05/27/2017 4.79  3.90 - 5.20 10*6/mm3 Final   • Hemoglobin 05/27/2017 13.6  11.9 - 15.5 g/dL Final   • Hematocrit 05/27/2017 41.4  35.6 - 45.5 % Final   • MCV 05/27/2017 86.4  80.5 - 98.2 fL Final   • MCH 05/27/2017 28.4  26.9 - 32.0 pg Final   • MCHC 05/27/2017 32.9  32.4 - 36.3 g/dL Final   • RDW 05/27/2017 14.5* 11.7 - 13.0 % Final   • RDW-SD 05/27/2017 45.8  37.0 - 54.0 fl Final   • MPV 05/27/2017 9.7  6.0 - 12.0 fL Final   • Platelets 05/27/2017 429  140 - 500 10*3/mm3 Final   • Neutrophil % 05/27/2017 61.4  42.7 - 76.0 % Final   • Lymphocyte % 05/27/2017 28.1  19.6 - 45.3 % Final   • Monocyte % 05/27/2017 7.1  5.0 - 12.0 % Final   • Eosinophil % 05/27/2017 2.2  0.3 - 6.2 % Final   • Basophil % 05/27/2017 1.0  0.0 - 1.5 % Final   • Immature Grans % 05/27/2017 0.2  0.0 - 0.5 % Final   • Neutrophils, Absolute 05/27/2017 5.54  1.90 - 8.10 10*3/mm3 Final   • Lymphocytes, Absolute 05/27/2017 2.54  0.90 - 4.80 10*3/mm3 Final   • Monocytes, Absolute 05/27/2017 0.64  0.20 - 1.20 10*3/mm3 Final   • Eosinophils, Absolute 05/27/2017 0.20  0.00 - 0.70 10*3/mm3 Final   • Basophils, Absolute 05/27/2017 0.09  0.00 - 0.20 10*3/mm3 Final   • Immature Grans, Absolute 05/27/2017 0.02  0.00 - 0.03 10*3/mm3 Final   • RBC, UA 05/27/2017 None Seen  None  Seen, 0-2 /HPF Final   • WBC, UA 05/27/2017 None Seen  None Seen, 0-2 /HPF Final   • Bacteria, UA 05/27/2017 None Seen  None Seen /HPF Final   • Squamous Epithelial Cells, UA 05/27/2017 0-2  None Seen, 0-2 /HPF Final   • Hyaline Casts, UA 05/27/2017 None Seen  None Seen /LPF Final   • Methodology 05/27/2017 Manual Light Microscopy   Final   • Hemoglobin A1C 05/27/2017 14.75* 4.80 - 5.60 % Final   • Glucose 05/27/2017 203* 70 - 130 mg/dL Final   • Glucose 05/28/2017 228* 65 - 99 mg/dL Final   • BUN 05/28/2017 15  8 - 23 mg/dL Final   • Creatinine 05/28/2017 0.92  0.57 - 1.00 mg/dL Final   • Sodium 05/28/2017 134* 136 - 145 mmol/L Final   • Potassium 05/28/2017 3.9  3.5 - 5.2 mmol/L Final   • Chloride 05/28/2017 97* 98 - 107 mmol/L Final   • CO2 05/28/2017 21.6* 22.0 - 29.0 mmol/L Final   • Calcium 05/28/2017 9.5  8.6 - 10.5 mg/dL Final   • eGFR Non African Amer 05/28/2017 59* >60 mL/min/1.73 Final   • BUN/Creatinine Ratio 05/28/2017 16.3  7.0 - 25.0 Final   • Anion Gap 05/28/2017 15.4  mmol/L Final   • WBC 05/28/2017 8.69  4.50 - 10.70 10*3/mm3 Final   • RBC 05/28/2017 4.67  3.90 - 5.20 10*6/mm3 Final   • Hemoglobin 05/28/2017 13.1  11.9 - 15.5 g/dL Final   • Hematocrit 05/28/2017 39.9  35.6 - 45.5 % Final   • MCV 05/28/2017 85.4  80.5 - 98.2 fL Final   • MCH 05/28/2017 28.1  26.9 - 32.0 pg Final   • MCHC 05/28/2017 32.8  32.4 - 36.3 g/dL Final   • RDW 05/28/2017 14.6* 11.7 - 13.0 % Final   • RDW-SD 05/28/2017 45.6  37.0 - 54.0 fl Final   • MPV 05/28/2017 9.7  6.0 - 12.0 fL Final   • Platelets 05/28/2017 408  140 - 500 10*3/mm3 Final   • Neutrophil % 05/28/2017 61.6  42.7 - 76.0 % Final   • Lymphocyte % 05/28/2017 27.0  19.6 - 45.3 % Final   • Monocyte % 05/28/2017 7.2  5.0 - 12.0 % Final   • Eosinophil % 05/28/2017 3.2  0.3 - 6.2 % Final   • Basophil % 05/28/2017 0.7  0.0 - 1.5 % Final   • Immature Grans % 05/28/2017 0.3  0.0 - 0.5 % Final   • Neutrophils, Absolute 05/28/2017 5.34  1.90 - 8.10 10*3/mm3 Final   •  Lymphocytes, Absolute 05/28/2017 2.35  0.90 - 4.80 10*3/mm3 Final   • Monocytes, Absolute 05/28/2017 0.63  0.20 - 1.20 10*3/mm3 Final   • Eosinophils, Absolute 05/28/2017 0.28  0.00 - 0.70 10*3/mm3 Final   • Basophils, Absolute 05/28/2017 0.06  0.00 - 0.20 10*3/mm3 Final   • Immature Grans, Absolute 05/28/2017 0.03  0.00 - 0.03 10*3/mm3 Final   • Free T4 05/28/2017 0.21* 0.93 - 1.70 ng/dL Final   • TSH 05/28/2017 >100.000* 0.270 - 4.200 mIU/mL Final   • Glucose 05/28/2017 245* 70 - 130 mg/dL Final   • Glucose 05/28/2017 403* 70 - 130 mg/dL Final   • T3, Free 05/28/2017 0.56* 2.00 - 4.40 pg/mL Final   • Glucose 05/28/2017 242* 70 - 130 mg/dL Final   • Glucose 05/28/2017 247* 70 - 130 mg/dL Final   • Cortisol 05/29/2017 18.21    mcg/dL Final   • Glucose 05/29/2017 231* 70 - 130 mg/dL Final   • Glucose 05/29/2017 241* 70 - 130 mg/dL Final   • Glucose 05/29/2017 290* 70 - 130 mg/dL Final   • Glucose 05/29/2017 159* 70 - 130 mg/dL Final   • Total Cholesterol 05/30/2017 283* 0 - 200 mg/dL Final   • Triglycerides 05/30/2017 158* 0 - 150 mg/dL Final   • HDL Cholesterol 05/30/2017 38* 40 - 60 mg/dL Final   • LDL Cholesterol  05/30/2017 213* 0 - 100 mg/dL Final   • VLDL Cholesterol 05/30/2017 31.6  5 - 40 mg/dL Final   • LDL/HDL Ratio 05/30/2017 5.62   Final   • 25 Hydroxy, Vitamin D 05/30/2017 24.2* 30.0 - 100.0 ng/ml Final   • Glucose 05/30/2017 258* 70 - 130 mg/dL Final   • Glucose 05/30/2017 267* 70 - 130 mg/dL Final   • Glucose 05/30/2017 202* 70 - 130 mg/dL Final   • Glucose 05/30/2017 161* 70 - 130 mg/dL Final   • Glucose 05/31/2017 203* 70 - 130 mg/dL Final   • Glucose 05/31/2017 199* 70 - 130 mg/dL Final   • Glucose 05/31/2017 226* 70 - 130 mg/dL Final   • Glucose 05/31/2017 140* 70 - 130 mg/dL Final   • Free T4 06/01/2017 0.88* 0.93 - 1.70 ng/dL Final   • T3, Free 06/01/2017 2.28  2.00 - 4.40 pg/mL Final   • TSH 06/01/2017 21.900* 0.270 - 4.200 mIU/mL Final   • Glucose 06/01/2017 315* 70 - 130 mg/dL Final   •  Glucose 06/01/2017 144* 70 - 130 mg/dL Final     Lab Results   Component Value Date    HGBA1C 14.75 (H) 05/27/2017    HGBA1C 13.02 (H) 05/16/2016     Lab Results   Component Value Date    LDLCALC 213 (H) 05/30/2017    CREATININE 0.92 05/28/2017     Imaging Results (most recent)     None                Assessment and Plan:    Halie was seen today for hypothyroidism and diabetes.    Diagnoses and all orders for this visit:    Severe hypothyroidism  -     Vitamin B12 & Folate  -     TSH  -     Microalbumin / Creatinine Urine Ratio  -     Lipid Panel  -     Basic Metabolic Panel  -     Hemoglobin A1c  -     T4, Free  -     Hemoglobin A1c; Future  -     Basic Metabolic Panel; Future  -     TSH; Future  -     Lipid Panel; Future    Type 2 diabetes mellitus with other circulatory complication, with long-term current use of insulin  -     Vitamin B12 & Folate  -     TSH  -     Microalbumin / Creatinine Urine Ratio  -     Lipid Panel  -     Basic Metabolic Panel  -     Hemoglobin A1c  -     T4, Free  -     Hemoglobin A1c; Future  -     Basic Metabolic Panel; Future  -     TSH; Future  -     Lipid Panel; Future    Other hyperlipidemia  -     Vitamin B12 & Folate  -     TSH  -     Microalbumin / Creatinine Urine Ratio  -     Lipid Panel  -     Basic Metabolic Panel  -     Hemoglobin A1c  -     T4, Free  -     Hemoglobin A1c; Future  -     Basic Metabolic Panel; Future  -     TSH; Future  -     Lipid Panel; Future    Will call patient's pharmacy and confirm her medications along with if patient is still on steroids.    Type 2 diabetes mellitus  Placed continuous glucose monitoring to help us with her blood sugar trends and as a result would adjust her insulin regimen.  Will check HbA1c and adjust her insulin regimen.  For now we will continue the current insulin regimen.  Also need to confirm her medications from the pharmacy.    Hypothyroidism  Continue levothyroxine 125 µg oral daily  Will check TSH levels today and adjust  the dosage accordingly.    Hyperlipidemia  Continue Lipitor 80 mg oral daily  Will check lipid panel and evaluate.    13 minutes out of 25 minutes face to face spent in counseling the patient extensively on treatment plan and medication changes/CGM placement.

## 2017-08-02 LAB
ALBUMIN/CREAT UR: 115.3 MG/G CREAT (ref 0–30)
BUN SERPL-MCNC: 22 MG/DL (ref 8–23)
BUN/CREAT SERPL: 26.8 (ref 7–25)
CALCIUM SERPL-MCNC: 11.2 MG/DL (ref 8.6–10.5)
CHLORIDE SERPL-SCNC: 99 MMOL/L (ref 98–107)
CHOLEST SERPL-MCNC: 124 MG/DL (ref 0–200)
CO2 SERPL-SCNC: 28.2 MMOL/L (ref 22–29)
CREAT SERPL-MCNC: 0.82 MG/DL (ref 0.57–1)
CREAT UR-MCNC: 165.7 MG/DL
FOLATE SERPL-MCNC: >20 NG/ML (ref 4.78–24.2)
GLUCOSE SERPL-MCNC: 117 MG/DL (ref 65–99)
HBA1C MFR BLD: 9.7 % (ref 4.8–5.6)
HDLC SERPL-MCNC: 33 MG/DL (ref 40–60)
LDLC SERPL CALC-MCNC: 68 MG/DL (ref 0–100)
MICROALBUMIN UR-MCNC: 191 UG/ML
POTASSIUM SERPL-SCNC: 3.9 MMOL/L (ref 3.5–5.2)
SODIUM SERPL-SCNC: 144 MMOL/L (ref 136–145)
T4 FREE SERPL-MCNC: 1.05 NG/DL (ref 0.93–1.7)
TRIGL SERPL-MCNC: 117 MG/DL (ref 0–150)
TSH SERPL DL<=0.005 MIU/L-ACNC: 18.44 MIU/ML (ref 0.27–4.2)
VIT B12 SERPL-MCNC: 1032 PG/ML (ref 211–946)
VLDLC SERPL CALC-MCNC: 23.4 MG/DL (ref 5–40)

## 2017-08-02 RX ORDER — LEVOTHYROXINE SODIUM 0.15 MG/1
150 TABLET ORAL DAILY
Qty: 30 TABLET | Refills: 11 | Status: SHIPPED | OUTPATIENT
Start: 2017-08-02 | End: 2017-09-29 | Stop reason: SDUPTHER

## 2017-08-04 RX ORDER — DULOXETIN HYDROCHLORIDE 30 MG/1
90 CAPSULE, DELAYED RELEASE ORAL DAILY
Qty: 90 CAPSULE | Refills: 0 | Status: SHIPPED | OUTPATIENT
Start: 2017-08-04 | End: 2017-09-29 | Stop reason: SDUPTHER

## 2017-08-17 ENCOUNTER — TREATMENT (OUTPATIENT)
Dept: ENDOCRINOLOGY | Age: 77
End: 2017-08-17

## 2017-08-17 DIAGNOSIS — Z79.4 TYPE 2 DIABETES MELLITUS WITH OTHER CIRCULATORY COMPLICATION, WITH LONG-TERM CURRENT USE OF INSULIN (HCC): ICD-10-CM

## 2017-08-17 DIAGNOSIS — E11.59 TYPE 2 DIABETES MELLITUS WITH OTHER CIRCULATORY COMPLICATION, WITH LONG-TERM CURRENT USE OF INSULIN (HCC): ICD-10-CM

## 2017-08-17 PROCEDURE — 95251 CONT GLUC MNTR ANALYSIS I&R: CPT | Performed by: INTERNAL MEDICINE

## 2017-08-17 NOTE — PROGRESS NOTES
Continues glucose monitoring data    Patient wore continuous glucose monitoring-free style jose c Pro from August 1-August 4, 2017-4 days.  Reports that the CGM fell off on its own  Average blood glucose reading was 2 41 mg/dL  Estimated [id number] not get information  Likelihood of low blood glucose levels has been low for the days that the pt wore the continuous glucose monitoring  Likelihood of high blood glucose levels has been high consistently  Blood glucose trends showed - high all day long    Current treatment regimen  Currently on levemir 54 units at bedtime and NovoLog 12 units with each meal    Changes to the treatment regimen  Since the data is indeterminant will continue the patient on levemir 54 units at bedtime and NovoLog 12 units with each meal.  Will call the patient and  reemphasize on the current doses of her insulin as during the office visit it has been unclear on how much insulin the patient is exactly taking.  Also given patient's age is extremely important to establish accurate doses of her insulin regimen

## 2017-09-05 RX ORDER — INSULIN ASPART 100 [IU]/ML
INJECTION, SOLUTION INTRAVENOUS; SUBCUTANEOUS
Qty: 30 ML | Refills: 0 | OUTPATIENT
Start: 2017-09-05

## 2017-09-05 RX ORDER — INSULIN DETEMIR 100 [IU]/ML
INJECTION, SOLUTION SUBCUTANEOUS
Qty: 45 ML | Refills: 0 | OUTPATIENT
Start: 2017-09-05

## 2017-09-14 ENCOUNTER — TELEPHONE (OUTPATIENT)
Dept: FAMILY MEDICINE CLINIC | Facility: CLINIC | Age: 77
End: 2017-09-14

## 2017-09-14 DIAGNOSIS — I10 BENIGN ESSENTIAL HYPERTENSION: Primary | ICD-10-CM

## 2017-09-14 RX ORDER — BISOPROLOL FUMARATE 5 MG/1
2.5 TABLET, FILM COATED ORAL
Qty: 90 TABLET | Refills: 2 | Status: SHIPPED | OUTPATIENT
Start: 2017-09-14 | End: 2017-12-19 | Stop reason: HOSPADM

## 2017-09-14 RX ORDER — HYDROCHLOROTHIAZIDE 12.5 MG/1
6.25 TABLET ORAL DAILY
Qty: 90 TABLET | Refills: 2 | Status: SHIPPED | OUTPATIENT
Start: 2017-09-14 | End: 2017-12-07 | Stop reason: SDUPTHER

## 2017-09-22 ENCOUNTER — TELEPHONE (OUTPATIENT)
Dept: FAMILY MEDICINE CLINIC | Facility: CLINIC | Age: 77
End: 2017-09-22

## 2017-09-29 DIAGNOSIS — E78.2 MIXED HYPERLIPIDEMIA: ICD-10-CM

## 2017-09-29 RX ORDER — ATORVASTATIN CALCIUM 80 MG/1
80 TABLET, FILM COATED ORAL DAILY
Qty: 90 TABLET | Refills: 3 | Status: SHIPPED | OUTPATIENT
Start: 2017-09-29 | End: 2017-12-07 | Stop reason: SDUPTHER

## 2017-09-29 RX ORDER — LEVOTHYROXINE SODIUM 0.15 MG/1
150 TABLET ORAL DAILY
Qty: 30 TABLET | Refills: 11 | Status: SHIPPED | OUTPATIENT
Start: 2017-09-29 | End: 2017-11-14 | Stop reason: SDUPTHER

## 2017-09-29 RX ORDER — DULOXETIN HYDROCHLORIDE 30 MG/1
30 CAPSULE, DELAYED RELEASE ORAL DAILY
Qty: 90 CAPSULE | Refills: 3 | Status: SHIPPED | OUTPATIENT
Start: 2017-09-29 | End: 2017-12-07 | Stop reason: SDUPTHER

## 2017-10-30 ENCOUNTER — RESULTS ENCOUNTER (OUTPATIENT)
Dept: ENDOCRINOLOGY | Age: 77
End: 2017-10-30

## 2017-10-30 DIAGNOSIS — Z79.4 TYPE 2 DIABETES MELLITUS WITH OTHER CIRCULATORY COMPLICATION, WITH LONG-TERM CURRENT USE OF INSULIN (HCC): ICD-10-CM

## 2017-10-30 DIAGNOSIS — E03.9 SEVERE HYPOTHYROIDISM: ICD-10-CM

## 2017-10-30 DIAGNOSIS — E11.59 TYPE 2 DIABETES MELLITUS WITH OTHER CIRCULATORY COMPLICATION, WITH LONG-TERM CURRENT USE OF INSULIN (HCC): ICD-10-CM

## 2017-10-30 DIAGNOSIS — E78.49 OTHER HYPERLIPIDEMIA: ICD-10-CM

## 2017-11-01 LAB
BUN SERPL-MCNC: 29 MG/DL (ref 8–23)
BUN/CREAT SERPL: 26.4 (ref 7–25)
CALCIUM SERPL-MCNC: 10.2 MG/DL (ref 8.6–10.5)
CHLORIDE SERPL-SCNC: 93 MMOL/L (ref 98–107)
CHOLEST SERPL-MCNC: 281 MG/DL (ref 0–200)
CO2 SERPL-SCNC: 22.9 MMOL/L (ref 22–29)
CREAT SERPL-MCNC: 1.1 MG/DL (ref 0.57–1)
GFR SERPLBLD CREATININE-BSD FMLA CKD-EPI: 48 ML/MIN/1.73
GFR SERPLBLD CREATININE-BSD FMLA CKD-EPI: 58 ML/MIN/1.73
GLUCOSE SERPL-MCNC: 452 MG/DL (ref 65–99)
HBA1C MFR BLD: 10.16 % (ref 4.8–5.6)
HDLC SERPL-MCNC: 38 MG/DL (ref 40–60)
LDLC SERPL CALC-MCNC: 192 MG/DL (ref 0–100)
POTASSIUM SERPL-SCNC: 5.1 MMOL/L (ref 3.5–5.2)
SODIUM SERPL-SCNC: 135 MMOL/L (ref 136–145)
TRIGL SERPL-MCNC: 254 MG/DL (ref 0–150)
TSH SERPL DL<=0.005 MIU/L-ACNC: 33.87 MIU/ML (ref 0.27–4.2)
VLDLC SERPL CALC-MCNC: 50.8 MG/DL (ref 5–40)

## 2017-11-14 ENCOUNTER — OFFICE VISIT (OUTPATIENT)
Dept: ENDOCRINOLOGY | Age: 77
End: 2017-11-14

## 2017-11-14 VITALS
OXYGEN SATURATION: 93 % | HEIGHT: 68 IN | SYSTOLIC BLOOD PRESSURE: 130 MMHG | DIASTOLIC BLOOD PRESSURE: 80 MMHG | HEART RATE: 106 BPM | BODY MASS INDEX: 28.79 KG/M2 | WEIGHT: 190 LBS

## 2017-11-14 DIAGNOSIS — IMO0002 UNCONTROLLED TYPE 2 DIABETES MELLITUS WITH COMPLICATION, WITH LONG-TERM CURRENT USE OF INSULIN: ICD-10-CM

## 2017-11-14 DIAGNOSIS — G25.2 COARSE TREMORS: ICD-10-CM

## 2017-11-14 DIAGNOSIS — E03.8 OTHER SPECIFIED HYPOTHYROIDISM: Primary | ICD-10-CM

## 2017-11-14 PROCEDURE — 99214 OFFICE O/P EST MOD 30 MIN: CPT | Performed by: INTERNAL MEDICINE

## 2017-11-14 RX ORDER — LEVOTHYROXINE SODIUM 175 UG/1
175 TABLET ORAL DAILY
Qty: 30 TABLET | Refills: 11 | Status: SHIPPED | OUTPATIENT
Start: 2017-11-14 | End: 2017-12-07 | Stop reason: SDUPTHER

## 2017-11-14 RX ORDER — HYDROCODONE BITARTRATE AND ACETAMINOPHEN 10; 325 MG/1; MG/1
TABLET ORAL
COMMUNITY
Start: 2017-10-31 | End: 2017-12-19 | Stop reason: HOSPADM

## 2017-11-14 NOTE — PROGRESS NOTES
77 y.o.    Patient Care Team:  Napoleon Mead MD as PCP - General  Napoleon Mead MD as PCP - Family Medicine    Chief Complaint:    Follow up appointment/ Type 2 Diabetes Mellitus/ Hypothyroidism  Subjective     HPI  77-year-old white female with past medical history of type 2 diabetes mellitus, hyperlipidemia, hypothyroidism is here as a follow up.  She was seen by me during her hospitalization in May 2017 when she was admitted with generalized weakness and was found to have TSH above 100.  She was treated with IV levothyroxin and was discharged on oral levothyroxin.    Since her discharge it is very unclear as to what exact doses of medications that the patient is taking at home.  Her thyroid levels have never normalized and her HbA1c continues to be elevated.  Today in the clinic patient is with her son.    Hypothyroidism  Currently on levothyroxin 150 µg oral daily.  She reports that she is taking the medication every single day but her thyroid levels have worsened even after increasing the dose of levothyroxin from 125 to150 during last appointment.    Upon discussing with the patient extensively she then confirms that she skipped a few of her levothyroxin dosages.  She reports extreme fatigue, weight has been relatively stable, occasional constipation, complains of cold intolerance and dry skin.    Type 2 diabetes mellitus  Currently on levemir 56 units at bedtime, NovoLog 12 units with each meal, metformin thousand milligrams twice daily.  She reports taking these medications but again I'm not really sure about her compliance.  She reports that she eats cookies all the time.  She doesn't know how to cook and as a result eats cookies only.  Complains of increased urination and thirst.  Last eye exam was 1 year ago and no history of diabetic retinopathy at that time.  Complains of tingling, burning sensation and numbness in her bilateral feet.  Unsure if she is checking her sugars at home.  No prior history  of CAD, CVA but on today's blood work up patient's creatinine was elevated.  She has become very unstable on her feet in the last few months and she recently fell down one week ago and was on floor for the last 3-4 days until her son came to check up on her.    Hyperlipidemia  Takes Lipitor 80 mg oral daily.       Interval History      The following portions of the patient's history were reviewed and updated as appropriate: allergies, current medications, past family history, past medical history, past social history, past surgical history and problem list.    Past Medical History:   Diagnosis Date   • Arthritis    • Benign essential hypertension 08/20/2014   • Bleeding disorder    • Diabetes mellitus, type 2    • Disc degeneration, lumbar    • Hyperlipidemia    • Hypothyroidism    • Neuropathy    • Osteoporosis 09/09/2015   • Rotator cuff tear, left    • Scoliosis    • Shoulder pain     LEFT, TORN ROTATOR CUFF S/P FALL   • Spinal stenosis      Family History   Problem Relation Age of Onset   • Bone cancer Mother    • No Known Problems Father      Social History     Social History   • Marital status:      Spouse name: N/A   • Number of children: N/A   • Years of education: N/A     Occupational History   • Not on file.     Social History Main Topics   • Smoking status: Former Smoker     Packs/day: 1.00     Quit date: 2013   • Smokeless tobacco: Never Used   • Alcohol use No   • Drug use: No   • Sexual activity: Defer     Other Topics Concern   • Not on file     Social History Narrative     Allergies   Allergen Reactions   • Sulfa Antibiotics        Current Outpatient Prescriptions:   •  atorvastatin (LIPITOR) 80 MG tablet, Take 1 tablet by mouth Daily., Disp: 90 tablet, Rfl: 3  •  cholecalciferol 5000 UNITS tablet, Take 5,000 Units by mouth Daily., Disp: , Rfl:   •  dextrose (GLUTOSE) 40 % gel, Take 15 g by mouth Every 15 (Fifteen) Minutes As Needed for Low Blood Sugar (Blood < 70, Patient Alert, Is Not NPO &  Can Safely Swallow)., Disp: , Rfl:   •  DULoxetine (CYMBALTA) 30 MG capsule, Take 1 capsule by mouth Daily., Disp: 90 capsule, Rfl: 3  •  hydrochlorothiazide (HYDRODIURIL) 12.5 MG tablet, Take 0.5 tablets by mouth Daily., Disp: 90 tablet, Rfl: 2  •  HYDROcodone-acetaminophen (NORCO)  MG per tablet, , Disp: , Rfl:   •  insulin aspart (novoLOG) 100 UNIT/ML injection, Inject 12 Units under the skin 3 (Three) Times a Day With Meals., Disp: , Rfl: 12  •  insulin aspart (novoLOG) 100 UNIT/ML injection, Inject 8 Units under the skin 3 (Three) Times a Day Before Meals., Disp: 2160 Units, Rfl: 1  •  insulin detemir (LEVEMIR) 100 UNIT/ML injection, Inject 50 Units under the skin Every Night. (Patient taking differently: Inject 56 Units under the skin Every Night.), Disp: 15 pen, Rfl: 1  •  levothyroxine (SYNTHROID, LEVOTHROID) 150 MCG tablet, Take 1 tablet by mouth Daily., Disp: 30 tablet, Rfl: 11  •  LYRICA 75 MG capsule, , Disp: , Rfl:   •  bisoprolol (ZEBeta) 5 MG tablet, Take 0.5 tablets by mouth Daily., Disp: 90 tablet, Rfl: 2        Review of Systems   Constitutional: Negative for fever.   HENT: Negative for facial swelling, nosebleeds, trouble swallowing and voice change.    Eyes: Negative for pain and redness.   Respiratory: Negative for shortness of breath and wheezing.    Cardiovascular: Negative for palpitations and leg swelling.   Gastrointestinal: Negative for abdominal pain, diarrhea and vomiting.   Endocrine: Negative for polydipsia and polyuria.   Genitourinary: Negative for decreased urine volume and frequency.   Musculoskeletal: Positive for back pain. Negative for joint swelling and neck pain.   Skin: Positive for rash (legs).   Allergic/Immunologic: Negative for immunocompromised state.   Neurological: Negative for seizures and facial asymmetry. Tremors: gotten worse.   Hematological: Does not bruise/bleed easily.   Psychiatric/Behavioral: Negative for agitation and confusion.       Objective  "      Vitals:    11/14/17 1335   BP: 130/80   Pulse: 106   SpO2: 93%   Weight: 190 lb (86.2 kg)   Height: 68\" (172.7 cm)     Body mass index is 28.89 kg/(m^2).      Physical Exam   Gen exam - alert and oriented x 3, obese female, not in distress.   HEENT - Acanthosis nigricans. Thyroid palpable.   Resp - Clear to auscultation.   CVS - S1,S2 heard and no murmurs.   Abd - Non tender, BS heard.   Ext - 1 + edema and Hammer toes, decreased pin prick and intact proprioception.   Uses a walker, tremors noted.     Results Review:     I reviewed the patient's new clinical results.    Medical records reviewed  Summary: done      Results Encounter on 10/30/2017   Component Date Value Ref Range Status   • Hemoglobin A1C 10/31/2017 10.16* 4.80 - 5.60 % Final    Comment: Hemoglobin A1C Ranges:  Increased Risk for Diabetes  5.7% to 6.4%  Diabetes                     >= 6.5%  Diabetic Goal                < 7.0%     • Glucose 10/31/2017 452* 65 - 99 mg/dL Final   • BUN 10/31/2017 29* 8 - 23 mg/dL Final   • Creatinine 10/31/2017 1.10* 0.57 - 1.00 mg/dL Final   • eGFR Non  Am 10/31/2017 48* >60 mL/min/1.73 Final    Comment: The MDRD GFR formula is only valid for adults with stable  renal function between ages 18 and 70.     • eGFR  Am 10/31/2017 58* >60 mL/min/1.73 Final   • BUN/Creatinine Ratio 10/31/2017 26.4* 7.0 - 25.0 Final   • Sodium 10/31/2017 135* 136 - 145 mmol/L Final   • Potassium 10/31/2017 5.1  3.5 - 5.2 mmol/L Final   • Chloride 10/31/2017 93* 98 - 107 mmol/L Final   • Total CO2 10/31/2017 22.9  22.0 - 29.0 mmol/L Final   • Calcium 10/31/2017 10.2  8.6 - 10.5 mg/dL Final   • TSH 10/31/2017 33.870* 0.270 - 4.200 mIU/mL Final   • Total Cholesterol 10/31/2017 281* 0 - 200 mg/dL Final   • Triglycerides 10/31/2017 254* 0 - 150 mg/dL Final   • HDL Cholesterol 10/31/2017 38* 40 - 60 mg/dL Final   • VLDL Cholesterol 10/31/2017 50.8* 5 - 40 mg/dL Final   • LDL Cholesterol  10/31/2017 192* 0 - 100 mg/dL Final "     Lab Results   Component Value Date    HGBA1C 10.16 (H) 10/31/2017    HGBA1C 9.70 (H) 08/01/2017    HGBA1C 14.75 (H) 05/27/2017     Lab Results   Component Value Date    MICROALBUR 191.0 08/01/2017    LDLCALC 213 (H) 05/30/2017    CREATININE 1.10 (H) 10/31/2017     Imaging Results (most recent)     None                Assessment and Plan:    Halie was seen today for diabetes and hypothyroidism.    Diagnoses and all orders for this visit:    Other specified hypothyroidism  -     Ambulatory Referral to Diabetic Education  -     Ambulatory Referral to Neurology    Uncontrolled type 2 diabetes mellitus with complication, with long-term current use of insulin  -     Ambulatory Referral to Diabetic Education  -     Ambulatory Referral to Neurology    Coarse tremors  -     Ambulatory Referral to Neurology    Hypothyroidism  Will increase levothyroxin to 175 µg oral daily.  Patient is on high doses of levothyroxin for her BMI.  Will repeat these blood work up in a period of 6 weeks.    Type 2 diabetes mellitus  Will refer the patient to diabetic educator to educate her on diet restrictions.  Will increase levemir to 58 units at bedtime, will add levemir 30 units in the morning  Will increase Novolog to 15 units with each meal  Will stop metformin due to her renal function    Discussed with the son that patient be considering home health or assisted living in her scenario.    Tremors  Will refer the patient to neurology.    Hyperlipidemia continue Lipitor 80 mg oral daily.    14 minutes out of 25 minutes face to face spent in counseling the patient extensively on medication changes and insulin regimen changes.

## 2017-12-07 DIAGNOSIS — E78.2 MIXED HYPERLIPIDEMIA: ICD-10-CM

## 2017-12-07 DIAGNOSIS — I10 BENIGN ESSENTIAL HYPERTENSION: ICD-10-CM

## 2017-12-07 RX ORDER — ATORVASTATIN CALCIUM 80 MG/1
80 TABLET, FILM COATED ORAL DAILY
Qty: 90 TABLET | Refills: 3 | Status: SHIPPED | OUTPATIENT
Start: 2017-12-07 | End: 2017-12-19 | Stop reason: HOSPADM

## 2017-12-07 RX ORDER — DULOXETIN HYDROCHLORIDE 30 MG/1
30 CAPSULE, DELAYED RELEASE ORAL DAILY
Qty: 90 CAPSULE | Refills: 3 | Status: SHIPPED | OUTPATIENT
Start: 2017-12-07 | End: 2017-12-19 | Stop reason: HOSPADM

## 2017-12-07 RX ORDER — HYDROCHLOROTHIAZIDE 12.5 MG/1
6.25 TABLET ORAL DAILY
Qty: 90 TABLET | Refills: 2 | Status: SHIPPED | OUTPATIENT
Start: 2017-12-07 | End: 2017-12-19 | Stop reason: HOSPADM

## 2017-12-07 RX ORDER — LEVOTHYROXINE SODIUM 175 UG/1
175 TABLET ORAL DAILY
Qty: 30 TABLET | Refills: 11 | Status: SHIPPED | OUTPATIENT
Start: 2017-12-07 | End: 2017-12-19 | Stop reason: HOSPADM

## 2017-12-15 ENCOUNTER — APPOINTMENT (OUTPATIENT)
Dept: GENERAL RADIOLOGY | Facility: HOSPITAL | Age: 77
End: 2017-12-15

## 2017-12-15 ENCOUNTER — APPOINTMENT (OUTPATIENT)
Dept: CT IMAGING | Facility: HOSPITAL | Age: 77
End: 2017-12-15

## 2017-12-15 ENCOUNTER — HOSPITAL ENCOUNTER (INPATIENT)
Facility: HOSPITAL | Age: 77
LOS: 4 days | Discharge: NURSING, REHAB OR LONG-TERM CARE | End: 2017-12-19
Attending: EMERGENCY MEDICINE | Admitting: INTERNAL MEDICINE

## 2017-12-15 DIAGNOSIS — E87.20 LACTIC ACIDOSIS: ICD-10-CM

## 2017-12-15 DIAGNOSIS — Z74.09 IMPAIRED FUNCTIONAL MOBILITY AND ACTIVITY TOLERANCE: ICD-10-CM

## 2017-12-15 DIAGNOSIS — R73.9 HYPERGLYCEMIA: ICD-10-CM

## 2017-12-15 DIAGNOSIS — R56.9 SEIZURES (HCC): ICD-10-CM

## 2017-12-15 DIAGNOSIS — R41.82 ALTERED MENTAL STATUS, UNSPECIFIED ALTERED MENTAL STATUS TYPE: Primary | ICD-10-CM

## 2017-12-15 LAB
ALBUMIN SERPL-MCNC: 4.4 G/DL (ref 3.5–5.2)
ALBUMIN/GLOB SERPL: 1 G/DL
ALP SERPL-CCNC: 99 U/L (ref 39–117)
ALT SERPL W P-5'-P-CCNC: 21 U/L (ref 1–33)
ANION GAP SERPL CALCULATED.3IONS-SCNC: 25.9 MMOL/L
ARTERIAL PATENCY WRIST A: POSITIVE
AST SERPL-CCNC: 16 U/L (ref 1–32)
ATMOSPHERIC PRESS: 751.1 MMHG
B-OH-BUTYR SERPL-SCNC: 0.37 MMOL/L (ref 0.02–0.27)
BASE EXCESS BLDA CALC-SCNC: -4.7 MMOL/L (ref 0–2)
BASOPHILS # BLD AUTO: 0.09 10*3/MM3 (ref 0–0.2)
BASOPHILS NFR BLD AUTO: 0.8 % (ref 0–1.5)
BDY SITE: ABNORMAL
BILIRUB SERPL-MCNC: 0.6 MG/DL (ref 0.1–1.2)
BUN BLD-MCNC: 28 MG/DL (ref 8–23)
BUN/CREAT SERPL: 22.2 (ref 7–25)
CALCIUM SPEC-SCNC: 10.8 MG/DL (ref 8.6–10.5)
CHLORIDE SERPL-SCNC: 84 MMOL/L (ref 98–107)
CO2 SERPL-SCNC: 19.1 MMOL/L (ref 22–29)
CREAT BLD-MCNC: 1.26 MG/DL (ref 0.57–1)
D-LACTATE SERPL-SCNC: 10.4 MMOL/L (ref 0.5–2)
D-LACTATE SERPL-SCNC: 3.4 MMOL/L (ref 0.5–2)
DEPRECATED RDW RBC AUTO: 50.3 FL (ref 37–54)
EOSINOPHIL # BLD AUTO: 0.08 10*3/MM3 (ref 0–0.7)
EOSINOPHIL NFR BLD AUTO: 0.7 % (ref 0.3–6.2)
ERYTHROCYTE [DISTWIDTH] IN BLOOD BY AUTOMATED COUNT: 15.5 % (ref 11.7–13)
GAS FLOW AIRWAY: 15 LPM
GFR SERPL CREATININE-BSD FRML MDRD: 41 ML/MIN/1.73
GLOBULIN UR ELPH-MCNC: 4.4 GM/DL
GLUCOSE BLD-MCNC: 671 MG/DL (ref 65–99)
GLUCOSE BLD-MCNC: 768 MG/DL (ref 65–99)
GLUCOSE BLDC GLUCOMTR-MCNC: 527 MG/DL (ref 70–130)
GLUCOSE BLDC GLUCOMTR-MCNC: >599 MG/DL (ref 70–130)
GLUCOSE BLDC GLUCOMTR-MCNC: >599 MG/DL (ref 70–130)
HBA1C MFR BLD: 11.52 % (ref 4.8–5.6)
HCO3 BLDA-SCNC: 20.4 MMOL/L (ref 22–28)
HCT VFR BLD AUTO: 44 % (ref 35.6–45.5)
HGB BLD-MCNC: 14.3 G/DL (ref 11.9–15.5)
HOLD SPECIMEN: NORMAL
IMM GRANULOCYTES # BLD: 0.05 10*3/MM3 (ref 0–0.03)
IMM GRANULOCYTES NFR BLD: 0.4 % (ref 0–0.5)
INR PPP: 1.14 (ref 0.9–1.1)
LYMPHOCYTES # BLD AUTO: 1.89 10*3/MM3 (ref 0.9–4.8)
LYMPHOCYTES NFR BLD AUTO: 16.3 % (ref 19.6–45.3)
MCH RBC QN AUTO: 28.9 PG (ref 26.9–32)
MCHC RBC AUTO-ENTMCNC: 32.5 G/DL (ref 32.4–36.3)
MCV RBC AUTO: 88.9 FL (ref 80.5–98.2)
MODALITY: ABNORMAL
MONOCYTES # BLD AUTO: 0.52 10*3/MM3 (ref 0.2–1.2)
MONOCYTES NFR BLD AUTO: 4.5 % (ref 5–12)
NEUTROPHILS # BLD AUTO: 8.97 10*3/MM3 (ref 1.9–8.1)
NEUTROPHILS NFR BLD AUTO: 77.3 % (ref 42.7–76)
PCO2 BLDA: 37.4 MM HG (ref 35–45)
PH BLDA: 7.34 PH UNITS (ref 7.35–7.45)
PLATELET # BLD AUTO: 453 10*3/MM3 (ref 140–500)
PMV BLD AUTO: 11.1 FL (ref 6–12)
PO2 BLDA: 178.4 MM HG (ref 80–100)
POTASSIUM BLD-SCNC: 5.3 MMOL/L (ref 3.5–5.2)
PROT SERPL-MCNC: 8.8 G/DL (ref 6–8.5)
PROTHROMBIN TIME: 14.2 SECONDS (ref 11.7–14.2)
RBC # BLD AUTO: 4.95 10*6/MM3 (ref 3.9–5.2)
SAO2 % BLDCOA: 99.5 % (ref 92–99)
SODIUM BLD-SCNC: 129 MMOL/L (ref 136–145)
TOTAL RATE: 15 BREATHS/MINUTE
VENTILATOR MODE: ABNORMAL
WBC NRBC COR # BLD: 11.6 10*3/MM3 (ref 4.5–10.7)
WHOLE BLOOD HOLD SPECIMEN: NORMAL
WHOLE BLOOD HOLD SPECIMEN: NORMAL

## 2017-12-15 PROCEDURE — 71010 HC CHEST PA OR AP: CPT

## 2017-12-15 PROCEDURE — 63710000001 INSULIN ASPART PER 5 UNITS: Performed by: INTERNAL MEDICINE

## 2017-12-15 PROCEDURE — 25010000003 LEVETIRACETAM IN NACL 0.75% 1000 MG/100ML SOLUTION: Performed by: EMERGENCY MEDICINE

## 2017-12-15 PROCEDURE — 85610 PROTHROMBIN TIME: CPT | Performed by: EMERGENCY MEDICINE

## 2017-12-15 PROCEDURE — 25010000002 ENOXAPARIN PER 10 MG: Performed by: INTERNAL MEDICINE

## 2017-12-15 PROCEDURE — 36415 COLL VENOUS BLD VENIPUNCTURE: CPT

## 2017-12-15 PROCEDURE — 82010 KETONE BODYS QUAN: CPT | Performed by: EMERGENCY MEDICINE

## 2017-12-15 PROCEDURE — 82962 GLUCOSE BLOOD TEST: CPT

## 2017-12-15 PROCEDURE — 36600 WITHDRAWAL OF ARTERIAL BLOOD: CPT

## 2017-12-15 PROCEDURE — 93010 ELECTROCARDIOGRAM REPORT: CPT | Performed by: INTERNAL MEDICINE

## 2017-12-15 PROCEDURE — 83605 ASSAY OF LACTIC ACID: CPT | Performed by: INTERNAL MEDICINE

## 2017-12-15 PROCEDURE — 93005 ELECTROCARDIOGRAM TRACING: CPT | Performed by: EMERGENCY MEDICINE

## 2017-12-15 PROCEDURE — 83036 HEMOGLOBIN GLYCOSYLATED A1C: CPT | Performed by: INTERNAL MEDICINE

## 2017-12-15 PROCEDURE — 93005 ELECTROCARDIOGRAM TRACING: CPT | Performed by: INTERNAL MEDICINE

## 2017-12-15 PROCEDURE — 63710000001 INSULIN DETEMER PER 5 UNITS: Performed by: INTERNAL MEDICINE

## 2017-12-15 PROCEDURE — 70450 CT HEAD/BRAIN W/O DYE: CPT

## 2017-12-15 PROCEDURE — 83605 ASSAY OF LACTIC ACID: CPT | Performed by: EMERGENCY MEDICINE

## 2017-12-15 PROCEDURE — 25010000002 LORAZEPAM PER 2 MG: Performed by: EMERGENCY MEDICINE

## 2017-12-15 PROCEDURE — 85025 COMPLETE CBC W/AUTO DIFF WBC: CPT | Performed by: EMERGENCY MEDICINE

## 2017-12-15 PROCEDURE — 72125 CT NECK SPINE W/O DYE: CPT

## 2017-12-15 PROCEDURE — 82803 BLOOD GASES ANY COMBINATION: CPT

## 2017-12-15 PROCEDURE — 99285 EMERGENCY DEPT VISIT HI MDM: CPT

## 2017-12-15 PROCEDURE — 82947 ASSAY GLUCOSE BLOOD QUANT: CPT | Performed by: INTERNAL MEDICINE

## 2017-12-15 PROCEDURE — 80053 COMPREHEN METABOLIC PANEL: CPT | Performed by: EMERGENCY MEDICINE

## 2017-12-15 PROCEDURE — 25010000002 LEVETIRACETAM IN NACL 0.82% 500 MG/100ML SOLUTION: Performed by: INTERNAL MEDICINE

## 2017-12-15 RX ORDER — ONDANSETRON 4 MG/1
4 TABLET, FILM COATED ORAL EVERY 6 HOURS PRN
Status: DISCONTINUED | OUTPATIENT
Start: 2017-12-15 | End: 2017-12-19 | Stop reason: HOSPADM

## 2017-12-15 RX ORDER — NICOTINE POLACRILEX 4 MG
15 LOZENGE BUCCAL
Status: DISCONTINUED | OUTPATIENT
Start: 2017-12-15 | End: 2017-12-19 | Stop reason: HOSPADM

## 2017-12-15 RX ORDER — ONDANSETRON 4 MG/1
4 TABLET, ORALLY DISINTEGRATING ORAL EVERY 6 HOURS PRN
Status: DISCONTINUED | OUTPATIENT
Start: 2017-12-15 | End: 2017-12-19 | Stop reason: HOSPADM

## 2017-12-15 RX ORDER — DEXTROSE MONOHYDRATE 25 G/50ML
25 INJECTION, SOLUTION INTRAVENOUS
Status: DISCONTINUED | OUTPATIENT
Start: 2017-12-15 | End: 2017-12-19 | Stop reason: HOSPADM

## 2017-12-15 RX ORDER — LEVETIRACETAM 10 MG/ML
1000 INJECTION INTRAVASCULAR ONCE
Status: COMPLETED | OUTPATIENT
Start: 2017-12-15 | End: 2017-12-15

## 2017-12-15 RX ORDER — SODIUM CHLORIDE 9 MG/ML
125 INJECTION, SOLUTION INTRAVENOUS CONTINUOUS
Status: DISCONTINUED | OUTPATIENT
Start: 2017-12-15 | End: 2017-12-17

## 2017-12-15 RX ORDER — BISACODYL 10 MG
10 SUPPOSITORY, RECTAL RECTAL DAILY PRN
Status: DISCONTINUED | OUTPATIENT
Start: 2017-12-15 | End: 2017-12-19 | Stop reason: HOSPADM

## 2017-12-15 RX ORDER — IPRATROPIUM BROMIDE AND ALBUTEROL SULFATE 2.5; .5 MG/3ML; MG/3ML
3 SOLUTION RESPIRATORY (INHALATION) EVERY 6 HOURS PRN
Status: DISCONTINUED | OUTPATIENT
Start: 2017-12-15 | End: 2017-12-19 | Stop reason: HOSPADM

## 2017-12-15 RX ORDER — ACETAMINOPHEN 650 MG/1
650 SUPPOSITORY RECTAL EVERY 4 HOURS PRN
Status: DISCONTINUED | OUTPATIENT
Start: 2017-12-15 | End: 2017-12-19 | Stop reason: HOSPADM

## 2017-12-15 RX ORDER — ALUMINA, MAGNESIA, AND SIMETHICONE 2400; 2400; 240 MG/30ML; MG/30ML; MG/30ML
15 SUSPENSION ORAL EVERY 6 HOURS PRN
Status: DISCONTINUED | OUTPATIENT
Start: 2017-12-15 | End: 2017-12-19 | Stop reason: HOSPADM

## 2017-12-15 RX ORDER — LEVETIRACETAM 5 MG/ML
500 INJECTION INTRAVASCULAR EVERY 12 HOURS SCHEDULED
Status: DISCONTINUED | OUTPATIENT
Start: 2017-12-15 | End: 2017-12-19 | Stop reason: HOSPADM

## 2017-12-15 RX ORDER — LORAZEPAM 4 MG/ML
1 INJECTION, SOLUTION INTRAMUSCULAR; INTRAVENOUS EVERY 4 HOURS PRN
Status: DISCONTINUED | OUTPATIENT
Start: 2017-12-15 | End: 2017-12-19 | Stop reason: HOSPADM

## 2017-12-15 RX ORDER — LORAZEPAM 4 MG/ML
2 INJECTION, SOLUTION INTRAMUSCULAR; INTRAVENOUS EVERY 4 HOURS PRN
Status: DISCONTINUED | OUTPATIENT
Start: 2017-12-15 | End: 2017-12-19 | Stop reason: HOSPADM

## 2017-12-15 RX ORDER — LORAZEPAM 2 MG/ML
2 INJECTION INTRAMUSCULAR ONCE
Status: COMPLETED | OUTPATIENT
Start: 2017-12-15 | End: 2017-12-15

## 2017-12-15 RX ORDER — HYDRALAZINE HYDROCHLORIDE 20 MG/ML
20 INJECTION INTRAMUSCULAR; INTRAVENOUS EVERY 4 HOURS PRN
Status: DISCONTINUED | OUTPATIENT
Start: 2017-12-15 | End: 2017-12-19 | Stop reason: HOSPADM

## 2017-12-15 RX ORDER — LORAZEPAM 2 MG/ML
2 INJECTION INTRAMUSCULAR
Status: DISCONTINUED | OUTPATIENT
Start: 2017-12-15 | End: 2017-12-19 | Stop reason: HOSPADM

## 2017-12-15 RX ORDER — LEVOTHYROXINE SODIUM ANHYDROUS 100 UG/5ML
88 INJECTION, POWDER, LYOPHILIZED, FOR SOLUTION INTRAVENOUS
Status: DISCONTINUED | OUTPATIENT
Start: 2017-12-15 | End: 2017-12-18

## 2017-12-15 RX ORDER — SODIUM CHLORIDE 0.9 % (FLUSH) 0.9 %
10 SYRINGE (ML) INJECTION AS NEEDED
Status: DISCONTINUED | OUTPATIENT
Start: 2017-12-15 | End: 2017-12-19 | Stop reason: HOSPADM

## 2017-12-15 RX ORDER — ACETAMINOPHEN 325 MG/1
650 TABLET ORAL EVERY 4 HOURS PRN
Status: DISCONTINUED | OUTPATIENT
Start: 2017-12-15 | End: 2017-12-19 | Stop reason: HOSPADM

## 2017-12-15 RX ORDER — ONDANSETRON 2 MG/ML
4 INJECTION INTRAMUSCULAR; INTRAVENOUS EVERY 6 HOURS PRN
Status: DISCONTINUED | OUTPATIENT
Start: 2017-12-15 | End: 2017-12-19 | Stop reason: HOSPADM

## 2017-12-15 RX ORDER — SODIUM CHLORIDE 0.9 % (FLUSH) 0.9 %
1-10 SYRINGE (ML) INJECTION AS NEEDED
Status: DISCONTINUED | OUTPATIENT
Start: 2017-12-15 | End: 2017-12-19 | Stop reason: HOSPADM

## 2017-12-15 RX ORDER — BISACODYL 5 MG/1
5 TABLET, DELAYED RELEASE ORAL DAILY PRN
Status: DISCONTINUED | OUTPATIENT
Start: 2017-12-15 | End: 2017-12-19 | Stop reason: HOSPADM

## 2017-12-15 RX ORDER — LORAZEPAM 2 MG/ML
1 INJECTION INTRAMUSCULAR ONCE
Status: DISCONTINUED | OUTPATIENT
Start: 2017-12-15 | End: 2017-12-15

## 2017-12-15 RX ADMIN — LEVETIRACETAM 500 MG: 5 INJECTION INTRAVENOUS at 21:30

## 2017-12-15 RX ADMIN — METOPROLOL TARTRATE 5 MG: 1 INJECTION, SOLUTION INTRAVENOUS at 22:06

## 2017-12-15 RX ADMIN — LEVETIRACETAM 1000 MG: 10 INJECTION INTRAVENOUS at 18:43

## 2017-12-15 RX ADMIN — SODIUM CHLORIDE 125 ML/HR: 9 INJECTION, SOLUTION INTRAVENOUS at 20:23

## 2017-12-15 RX ADMIN — ENOXAPARIN SODIUM 40 MG: 40 INJECTION SUBCUTANEOUS at 21:21

## 2017-12-15 RX ADMIN — LEVOTHYROXINE SODIUM ANHYDROUS 88 MCG: 100 INJECTION, POWDER, LYOPHILIZED, FOR SOLUTION INTRAVENOUS at 21:31

## 2017-12-15 RX ADMIN — INSULIN ASPART 14 UNITS: 100 INJECTION, SOLUTION INTRAVENOUS; SUBCUTANEOUS at 22:14

## 2017-12-15 RX ADMIN — LORAZEPAM 2 MG: 2 INJECTION INTRAMUSCULAR; INTRAVENOUS at 18:32

## 2017-12-15 RX ADMIN — INSULIN DETEMIR 56 UNITS: 100 INJECTION, SOLUTION SUBCUTANEOUS at 22:46

## 2017-12-15 RX ADMIN — SODIUM CHLORIDE 1000 ML: 9 INJECTION, SOLUTION INTRAVENOUS at 19:00

## 2017-12-15 NOTE — ED NOTES
Patient with seizure like activity in CT.  Deborah ORDONEZ and Cinda ALMENDAREZ RN responding.  Dr. Ferguson at bedside with Melani pharmacist     Cinda John RN  12/15/17 [id number]

## 2017-12-15 NOTE — ED NOTES
Spoke with patient's son Derrick.  He is in [place] and on way to hospital.  He will notify Ricky his brother to come to ER.  Consented for treatment     Cinda John RN  12/15/17 5340

## 2017-12-15 NOTE — ED NOTES
Pt had seizure like activity while on CT scan table, Dr. Ferguson informed. Pt turned on her side. No injury to patient occurred      Deborah Smith RN  12/15/17 [id number]

## 2017-12-15 NOTE — ED PROVIDER NOTES
"EMERGENCY DEPARTMENT ENCOUNTER       CHIEF COMPLAINT  Chief Complaint: Altered Mental Status  History given by: Paramedics  History limited by: Patient's mental status change   Room Number: 31/31  PMD: Napoleon Mead MD      HPI:  HPI Comments: All history was obtained by the paramedics. Per paramedics, pt was at a salon earlier today. While pt was at the salon, pt was found down by salon staff on the restroom floor. Pt was suspected to be down for about 30 minutes. While pt was lying on the restroom floor, salon staff noticed that pt \"went rigid and had a mild shaking episode\" for a brief period. Consequently, the paramedics were called. Upon paramedics' arrival on scene, pt was unresponsive and had blood glucose >600. Upon pt's arrival to the ER tonight, pt is very slightly responsive and nonverbal.     Patient is a 77 y.o. female presenting with altered mental status.   Altered Mental Status   Presenting symptoms: unresponsiveness    Severity:  Severe  Most recent episode:  Today  Episode history:  Continuous  Duration: pt was found down shortly before pt's arrival to the ER tonight.  Timing:  Constant  Progression:  Improving  Associated symptoms: abnormal movement (pt \"went rigid and had a mild shaking episode\")          PAST MEDICAL HISTORY  Active Ambulatory Problems     Diagnosis Date Noted   • Compression fracture 04/22/2009   • Lumbar degenerative disc disease 07/05/2012   • Diabetes mellitus, type 2    • Hyperlipidemia    • Benign essential hypertension 08/20/2014   • Hypothyroidism    • Leukocytosis    • Neuropathy    • Osteoporosis 09/09/2015   • Proteinuria 02/28/2012   • Tobacco abuse 08/22/2013   • Diabetic eye exam 02/12/2014   • Generalized weakness 05/27/2017   • Spinal stenosis 05/27/2017   • Scoliosis 05/27/2017   • Arthritis 05/27/2017   • VBI (vertebrobasilar insufficiency) 05/28/2017   • Orthostatic hypotension 05/28/2017   • 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 05/28/2017 " "  • Severe hypothyroidism 05/30/2017   • Noncompliance 05/30/2017   • Vitamin D deficiency disease 06/01/2017     Resolved Ambulatory Problems     Diagnosis Date Noted   • No Resolved Ambulatory Problems     Past Medical History:   Diagnosis Date   • Arthritis    • Benign essential hypertension 08/20/2014   • Bleeding disorder    • Diabetes mellitus, type 2    • Disc degeneration, lumbar    • Hyperlipidemia    • Hypothyroidism    • Neuropathy    • Osteoporosis 09/09/2015   • Rotator cuff tear, left    • Scoliosis    • Shoulder pain    • Spinal stenosis          PAST SURGICAL HISTORY  Past Surgical History:   Procedure Laterality Date   • APPENDECTOMY     • BILATERAL BREAST REDUCTION Bilateral 08/2015   • CATARACT EXTRACTION  03/2015   • COLONOSCOPY  06/05/2015    WNL   • KYPHOPLASTY     • REDUCTION MAMMAPLASTY     • TONSILLECTOMY           FAMILY HISTORY  Family History   Problem Relation Age of Onset   • Bone cancer Mother    • No Known Problems Father          SOCIAL HISTORY  Social History     Social History   • Marital status:      Spouse name: N/A   • Number of children: N/A   • Years of education: N/A     Occupational History   • Not on file.     Social History Main Topics   • Smoking status: Former Smoker     Packs/day: 1.00     Quit date: 2013   • Smokeless tobacco: Never Used   • Alcohol use No   • Drug use: No   • Sexual activity: Defer     Other Topics Concern   • Not on file     Social History Narrative         ALLERGIES  Sulfa antibiotics        REVIEW OF SYSTEMS  Review of Systems   Unable to perform ROS: Mental status change   Neurological:        Unresponsiveness - now improving; pt \"went rigid and had a mild shaking episode\"            PHYSICAL EXAM  ED Triage Vitals   Temp Heart Rate Resp BP SpO2   12/15/17 1807 12/15/17 1759 12/15/17 1759 12/15/17 1759 12/15/17 1759   95.9 °F (35.5 °C) 111 12 177/121 99 %      Temp src Heart Rate Source Patient Position BP Location FiO2 (%)   12/15/17 1807 " 12/15/17 1902 -- -- --   Tympanic Monitor          Physical Exam   Constitutional: She appears distressed.   HENT:   Head: Normocephalic.   Mouth/Throat: Mucous membranes are dry.   Eyes: EOM are normal. Pupils are equal, round, and reactive to light.   Neck: Neck supple.   Cardiovascular: Regular rhythm and normal heart sounds.  Tachycardia present.    Pulmonary/Chest: Effort normal and breath sounds normal. No respiratory distress. She has no decreased breath sounds. She has no wheezes. She has no rhonchi. She has no rales.   Abdominal: Soft. There is no tenderness. There is no rebound and no guarding.   Neurological:   Pt's eyes are open. She responds to noxious stimuli. Pt is nonverbal.    Skin: Skin is warm and dry.   Psychiatric:   Unable to assess adequately due to pt's mental status change.   Nursing note and vitals reviewed.          LAB RESULTS  Recent Results (from the past 24 hour(s))   Blood Gas, Arterial    Collection Time: 12/15/17  6:05 PM   Result Value Ref Range    Site Arterial: right brachial     Bebeto's Test Positive     pH, Arterial 7.345 (L) 7.350 - 7.450 pH units    pCO2, Arterial 37.4 35.0 - 45.0 mm Hg    pO2, Arterial 178.4 (H) 80.0 - 100.0 mm Hg    HCO3, Arterial 20.4 (L) 22.0 - 28.0 mmol/L    Base Excess, Arterial -4.7 (L) 0.0 - 2.0 mmol/L    O2 Saturation Calculated 99.5 (H) 92.0 - 99.0 %    Barometric Pressure for Blood Gas 751.1 mmHg    Modality NRB     Flow Rate 15 lpm    Ventilator Mode n/a     Rate 15 Breaths/minute   Comprehensive Metabolic Panel    Collection Time: 12/15/17  6:11 PM   Result Value Ref Range    Glucose 768 (C) 65 - 99 mg/dL    BUN 28 (H) 8 - 23 mg/dL    Creatinine 1.26 (H) 0.57 - 1.00 mg/dL    Sodium 129 (L) 136 - 145 mmol/L    Potassium 5.3 (H) 3.5 - 5.2 mmol/L    Chloride 84 (L) 98 - 107 mmol/L    CO2 19.1 (L) 22.0 - 29.0 mmol/L    Calcium 10.8 (H) 8.6 - 10.5 mg/dL    Total Protein 8.8 (H) 6.0 - 8.5 g/dL    Albumin 4.40 3.50 - 5.20 g/dL    ALT (SGPT) 21 1 - 33  U/L    AST (SGOT) 16 1 - 32 U/L    Alkaline Phosphatase 99 39 - 117 U/L    Total Bilirubin 0.6 0.1 - 1.2 mg/dL    eGFR Non African Amer 41 (L) >60 mL/min/1.73    Globulin 4.4 gm/dL    A/G Ratio 1.0 g/dL    BUN/Creatinine Ratio 22.2 7.0 - 25.0    Anion Gap 25.9 mmol/L   Protime-INR    Collection Time: 12/15/17  6:11 PM   Result Value Ref Range    Protime 14.2 11.7 - 14.2 Seconds    INR 1.14 (H) 0.90 - 1.10   Light Blue Top    Collection Time: 12/15/17  6:11 PM   Result Value Ref Range    Extra Tube hold for add-on    Green Top (Gel)    Collection Time: 12/15/17  6:11 PM   Result Value Ref Range    Extra Tube Hold for add-ons.    Lavender Top    Collection Time: 12/15/17  6:11 PM   Result Value Ref Range    Extra Tube hold for add-on    Gold Top - SST    Collection Time: 12/15/17  6:11 PM   Result Value Ref Range    Extra Tube Hold for add-ons.    CBC Auto Differential    Collection Time: 12/15/17  6:11 PM   Result Value Ref Range    WBC 11.60 (H) 4.50 - 10.70 10*3/mm3    RBC 4.95 3.90 - 5.20 10*6/mm3    Hemoglobin 14.3 11.9 - 15.5 g/dL    Hematocrit 44.0 35.6 - 45.5 %    MCV 88.9 80.5 - 98.2 fL    MCH 28.9 26.9 - 32.0 pg    MCHC 32.5 32.4 - 36.3 g/dL    RDW 15.5 (H) 11.7 - 13.0 %    RDW-SD 50.3 37.0 - 54.0 fl    MPV 11.1 6.0 - 12.0 fL    Platelets 453 140 - 500 10*3/mm3    Neutrophil % 77.3 (H) 42.7 - 76.0 %    Lymphocyte % 16.3 (L) 19.6 - 45.3 %    Monocyte % 4.5 (L) 5.0 - 12.0 %    Eosinophil % 0.7 0.3 - 6.2 %    Basophil % 0.8 0.0 - 1.5 %    Immature Grans % 0.4 0.0 - 0.5 %    Neutrophils, Absolute 8.97 (H) 1.90 - 8.10 10*3/mm3    Lymphocytes, Absolute 1.89 0.90 - 4.80 10*3/mm3    Monocytes, Absolute 0.52 0.20 - 1.20 10*3/mm3    Eosinophils, Absolute 0.08 0.00 - 0.70 10*3/mm3    Basophils, Absolute 0.09 0.00 - 0.20 10*3/mm3    Immature Grans, Absolute 0.05 (H) 0.00 - 0.03 10*3/mm3   Lactic Acid, Plasma    Collection Time: 12/15/17  6:11 PM   Result Value Ref Range    Lactate 10.4 (C) 0.5 - 2.0 mmol/L   Hemoglobin  A1c    Collection Time: 12/15/17  6:11 PM   Result Value Ref Range    Hemoglobin A1C 11.52 (H) 4.80 - 5.60 %   Beta Hydroxybutyrate Quantitative    Collection Time: 12/15/17  6:16 PM   Result Value Ref Range    Beta-Hydroxybutyrate Quant 0.368 (H) 0.020 - 0.270 mmol/L   POC Glucose Once    Collection Time: 12/15/17  6:50 PM   Result Value Ref Range    Glucose >599 (C) 70 - 130 mg/dL   POC Glucose Once    Collection Time: 12/15/17  8:10 PM   Result Value Ref Range    Glucose >599 (C) 70 - 130 mg/dL       Ordered the above labs and reviewed the results.        RADIOLOGY  CT Cervical Spine Without Contrast   Final Result   1. No acute abnormality.       This report was finalized on 12/15/2017 6:58 PM by Dr. Andrew Long MD.       Interpreted by radiologist. Independently viewed by me.      CT Head Without Contrast   Final Result   1. No evidence of acute intracranial process.       This report was finalized on 12/15/2017 6:46 PM by Dr. Andrew Long MD.       Interpreted by radiologist. Independently viewed by me.             Ordered the above noted radiological studies. Reviewed by me in PACS.       PROCEDURES  Critical Care  Performed by: CHRISTOPH LINARES  Authorized by: CHRISTOPH LINARES     Critical care provider statement:     Critical care time (minutes):  35    Critical care time was exclusive of:  Separately billable procedures and treating other patients    Critical care was necessary to treat or prevent imminent or life-threatening deterioration of the following conditions:  CNS failure or compromise and endocrine crisis    Critical care was time spent personally by me on the following activities:  Discussions with consultants, evaluation of patient's response to treatment, examination of patient, obtaining history from patient or surrogate, ordering and performing treatments and interventions, ordering and review of laboratory studies, ordering and review of radiographic studies, pulse oximetry and  re-evaluation of patient's condition          EKG:           EKG time: 18:14  Rhythm/Rate: Sinus tachycardia rate 118  P waves and MD: Normal P waves  QRS, axis: Normal QRS   ST and T waves: ST depression in V4, V5, V6     Interpreted Contemporaneously by me, independently viewed  No old EKG is available for comparison           PROGRESS AND CONSULTS  ED Course   Comment By Time   7:18 PM  Patient here for altered mental status.  On arrival patient appeared confused but was coming around and actually spoke.  In CT patient had another seizure and was given ativan and then keppra.  Patient has several metabolic issues.  Given fluids.  Lactic acidosis most likely from seizures as she was normal before this.  Discussed with Dr. Isbell who will admit. Marco Ferguson MD 12/15 1921     6:04 PM:  Pt is not a candidate for tPA as the time of onset of pt's symptoms is unclear. ABG, CT C-Spine, CT Head, beta hydroxybutyrate, blood work, and EKG ordered for further evaluation.     Pt is currently on a nonrebreather.     6:32 PM:  Rechecked pt. In the radiology room, pt had a witnessed seizure. Pt was administered Ativan. Decision time to admit: Now.      6:36 PM:  Pt will be started on Keppra to treat for seizures.     7:00 PM:  Pt's blood glucose is >700. IV fluids ordered. Placed call to the intensivist for admission.     7:09 PM:  Discussed the case with Dr. Isbell, intensivist. He will admit pt to an ICU bed. Dr. Isbell agrees with plan for starting the treatment of pt's hyperglycemia with IV fluids.             MEDICAL DECISION MAKING        MDM  Number of Diagnoses or Management Options  Altered mental status, unspecified altered mental status type:   Hyperglycemia:   Lactic acidosis:   Seizures:      Amount and/or Complexity of Data Reviewed  Clinical lab tests: ordered and reviewed (Blood glucose is 768. )  Tests in the radiology section of CPT®: ordered and reviewed (CT Head: No evidence of acute intracranial  process.   )  Tests in the medicine section of CPT®: ordered and reviewed (EKG interpreted.   )  Obtain history from someone other than the patient: yes (Paramedics provide significant hx. )  Discuss the patient with other providers: yes (Case d/w Dr. Isbell, intensivist, who will admit pt to an ICU bed.   )    Patient Progress  Patient progress: other (comment) (Pt is in critical condition. )             DIAGNOSIS  Final diagnoses:   Altered mental status, unspecified altered mental status type   Seizures   Hyperglycemia   Lactic acidosis         DISPOSITION  Pt admitted to the ICU.    ADMISSION    Discussed treatment plan and reason for admission with admitting physician.              Latest Documented Vital Signs:  As of 7:28 PM  BP- 144/87 HR- 120 Temp- 95.9 °F (35.5 °C) (Tympanic) O2 sat- 95%      --  Documentation assistance provided by lee Kelly for Dr. Marty MD.  Information recorded by the scribe was done at my direction and has been verified and validated by me.                 Bita Kelly  12/15/17 2057       Marco Ferguson MD  12/15/17 2100

## 2017-12-15 NOTE — ED TRIAGE NOTES
Patient found down in hair salon bathroom.  Decreased LOC.  Responds to loud verbal stimuli.  Appeared post ictal upon EMS arrival.  Glucose critical high with EMS.

## 2017-12-16 LAB
ANION GAP SERPL CALCULATED.3IONS-SCNC: 16 MMOL/L
ANION GAP SERPL CALCULATED.3IONS-SCNC: 18.3 MMOL/L
BUN BLD-MCNC: 19 MG/DL (ref 8–23)
BUN BLD-MCNC: 23 MG/DL (ref 8–23)
BUN/CREAT SERPL: 22.6 (ref 7–25)
BUN/CREAT SERPL: 23.5 (ref 7–25)
CALCIUM SPEC-SCNC: 9 MG/DL (ref 8.6–10.5)
CALCIUM SPEC-SCNC: 9.2 MG/DL (ref 8.6–10.5)
CHLORIDE SERPL-SCNC: 102 MMOL/L (ref 98–107)
CHLORIDE SERPL-SCNC: 96 MMOL/L (ref 98–107)
CO2 SERPL-SCNC: 22.7 MMOL/L (ref 22–29)
CO2 SERPL-SCNC: 23 MMOL/L (ref 22–29)
CREAT BLD-MCNC: 0.84 MG/DL (ref 0.57–1)
CREAT BLD-MCNC: 0.98 MG/DL (ref 0.57–1)
D-LACTATE SERPL-SCNC: 2.7 MMOL/L (ref 0.5–2)
DEPRECATED RDW RBC AUTO: 49.1 FL (ref 37–54)
ERYTHROCYTE [DISTWIDTH] IN BLOOD BY AUTOMATED COUNT: 15.1 % (ref 11.7–13)
GFR SERPL CREATININE-BSD FRML MDRD: 55 ML/MIN/1.73
GFR SERPL CREATININE-BSD FRML MDRD: 66 ML/MIN/1.73
GLUCOSE BLD-MCNC: 219 MG/DL (ref 65–99)
GLUCOSE BLD-MCNC: 501 MG/DL (ref 65–99)
GLUCOSE BLDC GLUCOMTR-MCNC: 106 MG/DL (ref 70–130)
GLUCOSE BLDC GLUCOMTR-MCNC: 128 MG/DL (ref 70–130)
GLUCOSE BLDC GLUCOMTR-MCNC: 151 MG/DL (ref 70–130)
GLUCOSE BLDC GLUCOMTR-MCNC: 173 MG/DL (ref 70–130)
GLUCOSE BLDC GLUCOMTR-MCNC: 302 MG/DL (ref 70–130)
HCT VFR BLD AUTO: 41.5 % (ref 35.6–45.5)
HGB BLD-MCNC: 13 G/DL (ref 11.9–15.5)
MCH RBC QN AUTO: 28.3 PG (ref 26.9–32)
MCHC RBC AUTO-ENTMCNC: 31.3 G/DL (ref 32.4–36.3)
MCV RBC AUTO: 90.2 FL (ref 80.5–98.2)
PLATELET # BLD AUTO: 436 10*3/MM3 (ref 140–500)
PMV BLD AUTO: 10.6 FL (ref 6–12)
POTASSIUM BLD-SCNC: 3.7 MMOL/L (ref 3.5–5.2)
POTASSIUM BLD-SCNC: 4 MMOL/L (ref 3.5–5.2)
RBC # BLD AUTO: 4.6 10*6/MM3 (ref 3.9–5.2)
SODIUM BLD-SCNC: 137 MMOL/L (ref 136–145)
SODIUM BLD-SCNC: 141 MMOL/L (ref 136–145)
WBC NRBC COR # BLD: 19.84 10*3/MM3 (ref 4.5–10.7)

## 2017-12-16 PROCEDURE — 80048 BASIC METABOLIC PNL TOTAL CA: CPT | Performed by: INTERNAL MEDICINE

## 2017-12-16 PROCEDURE — 85027 COMPLETE CBC AUTOMATED: CPT | Performed by: INTERNAL MEDICINE

## 2017-12-16 PROCEDURE — 25010000002 HYDRALAZINE PER 20 MG: Performed by: INTERNAL MEDICINE

## 2017-12-16 PROCEDURE — 84439 ASSAY OF FREE THYROXINE: CPT | Performed by: INTERNAL MEDICINE

## 2017-12-16 PROCEDURE — 94799 UNLISTED PULMONARY SVC/PX: CPT

## 2017-12-16 PROCEDURE — 25010000002 LEVETIRACETAM IN NACL 0.82% 500 MG/100ML SOLUTION: Performed by: INTERNAL MEDICINE

## 2017-12-16 PROCEDURE — 99222 1ST HOSP IP/OBS MODERATE 55: CPT | Performed by: PSYCHIATRY & NEUROLOGY

## 2017-12-16 PROCEDURE — 92610 EVALUATE SWALLOWING FUNCTION: CPT

## 2017-12-16 PROCEDURE — 82962 GLUCOSE BLOOD TEST: CPT

## 2017-12-16 PROCEDURE — 63710000001 INSULIN ASPART PER 5 UNITS: Performed by: INTERNAL MEDICINE

## 2017-12-16 PROCEDURE — 84443 ASSAY THYROID STIM HORMONE: CPT | Performed by: INTERNAL MEDICINE

## 2017-12-16 RX ADMIN — HYDRALAZINE HYDROCHLORIDE 20 MG: 20 INJECTION INTRAMUSCULAR; INTRAVENOUS at 08:14

## 2017-12-16 RX ADMIN — LEVETIRACETAM 500 MG: 5 INJECTION INTRAVENOUS at 21:00

## 2017-12-16 RX ADMIN — LEVOTHYROXINE SODIUM ANHYDROUS 88 MCG: 100 INJECTION, POWDER, LYOPHILIZED, FOR SOLUTION INTRAVENOUS at 10:20

## 2017-12-16 RX ADMIN — METOPROLOL TARTRATE 5 MG: 5 INJECTION, SOLUTION INTRAVENOUS at 22:00

## 2017-12-16 RX ADMIN — METOPROLOL TARTRATE 5 MG: 5 INJECTION, SOLUTION INTRAVENOUS at 17:04

## 2017-12-16 RX ADMIN — LEVETIRACETAM 500 MG: 5 INJECTION INTRAVENOUS at 08:21

## 2017-12-16 RX ADMIN — SODIUM CHLORIDE 125 ML/HR: 9 INJECTION, SOLUTION INTRAVENOUS at 03:18

## 2017-12-16 RX ADMIN — INSULIN ASPART 4 UNITS: 100 INJECTION, SOLUTION INTRAVENOUS; SUBCUTANEOUS at 17:05

## 2017-12-16 RX ADMIN — METOPROLOL TARTRATE 5 MG: 5 INJECTION, SOLUTION INTRAVENOUS at 10:20

## 2017-12-16 RX ADMIN — INSULIN ASPART 24 UNITS: 100 INJECTION, SOLUTION INTRAVENOUS; SUBCUTANEOUS at 00:53

## 2017-12-16 RX ADMIN — INSULIN ASPART 4 UNITS: 100 INJECTION, SOLUTION INTRAVENOUS; SUBCUTANEOUS at 08:22

## 2017-12-16 RX ADMIN — METOPROLOL TARTRATE 5 MG: 5 INJECTION, SOLUTION INTRAVENOUS at 04:00

## 2017-12-16 NOTE — NURSING NOTE
MD Isbell paged at 2030 to address glucose level, heart rate, and blood pressure. Metoprolol 5 mg IV ordered. MD Isbell paged at 2145, 2245, and 2250 regarding increasing BP and urine output. Awaiting response

## 2017-12-16 NOTE — H&P
[place] [facility]  HISTORY AND PHYSICAL   [facility]        Patient Identification:  Name: Halie Guidry  Age: 77 y.o.  Sex: female  :  1940  MRN: [id number]                     Primary Care Physician: Napoleon Mead MD    Chief Complaint:  seizure    History of Present Illness:   77-year-old white female brought in by paramedics after seizure-like activity at the hair salon earlier today.  Was found down by salon staff on the restroom floor.  Was felt to be down for approximately 30 minutes before found.  Staff noticed that the patient had tonic clonic type activity for brief period of time.  Patient was unresponsive upon paramedics arrival.  Glucose due to be greater than 600.  Patient.  Postictal upon arrival to the emergency room.  Had another seizure-like activity with associated altered mental status while in the emergency room.  Receiving Keppra.  CT head and neck reviewed.  Discussed with ER staff.  No current family available.  Patient is unable give any meaningful history currently secondary to altered mental status.    Past Medical History:  Past Medical History:   Diagnosis Date   • Arthritis    • Benign essential hypertension 2014   • Bleeding disorder    • Diabetes mellitus, type 2    • Disc degeneration, lumbar    • Hyperlipidemia    • Hypothyroidism    • Neuropathy    • Osteoporosis 2015   • Rotator cuff tear, left    • Scoliosis    • Shoulder pain     LEFT, TORN ROTATOR CUFF S/P FALL   • Spinal stenosis      Past Surgical History:  Past Surgical History:   Procedure Laterality Date   • APPENDECTOMY     • BILATERAL BREAST REDUCTION Bilateral 2015   • CATARACT EXTRACTION  2015   • COLONOSCOPY  2015    WNL   • KYPHOPLASTY     • REDUCTION MAMMAPLASTY     • TONSILLECTOMY        Home Meds:    (Not in a hospital admission)    Allergies:  Allergies   Allergen Reactions   • Sulfa Antibiotics      Immunizations:  Immunization History  "  Administered Date(s) Administered   • Influenza, Quadrivalent 10/03/2012   • Pneumococcal Conjugate 10/01/2009   • Td 2016   • Zoster 2010     Social History:   Social History     Social History Narrative     Social History   Substance Use Topics   • Smoking status: Former Smoker     Packs/day: 1.00     Quit date:    • Smokeless tobacco: Never Used   • Alcohol use No     Family History:  Family History   Problem Relation Age of Onset   • Bone cancer Mother    • No Known Problems Father         Review of Systems  Unobtainable due to altered mental status    Objective:  tMax 24 hrs: Temp (24hrs), Av.9 °F (35.5 °C), Min:95.9 °F (35.5 °C), Max:95.9 °F (35.5 °C)    Vitals Ranges:   Temp:  [95.9 °F (35.5 °C)] 95.9 °F (35.5 °C)  Heart Rate:  [111-127] 120  Resp:  [12] 12  BP: (144-177)/() 144/87      Exam:  /87  Pulse 120  Temp 95.9 °F (35.5 °C) (Tympanic)   Resp 12  Ht 167.6 cm (66\")  Wt 86.2 kg (190 lb)  SpO2 95%  BMI 30.67 kg/m2    GENERAL APPEARANCE:   · Postictal  · No acute distress   EYES:    · PERRL                                                                           · Conjunctiva normal  · Sclera non-icteric.  HENT:   · Atraumatic, normocephalic  · External ears and nose normal  · Moist mucus membranes and no ulcers  NECK:  · Thyroid not enlarged  · Trachea midline   RESPIRATORY:    · Nonlabored breathing   · Normal breath sounds  · No rales. No wheezing  · No dullness  CARDIOVASCULAR:    · Tachycardic  · Normal S1, S2  · No murmur  · Lower extremity edema: none    GI:   · Bowel sounds normal  · Abdomen soft , non-distended, non-tender  · No abdominal masses.    MUSCULOSKELETAL:  · Normal movement of extremities  · No tenderness, no deformities  · No clubbing or cyanosis   Skin:    · No visible rashes  · No palpable nodules  PSYCHIATRIC:  · Able to assess due to mental status change.  Patient is nonverbal.  Withdraws to noxious stimuli  NEUROLOGIC:  .    Data " Review:    Comprehensive Metabolic Panel [[id number]] (Abnormal) Collected: 12/15/17 1811       Lab Status: Final result Specimen: Blood Updated: 12/15/17 1858        Glucose 768 (C) mg/dL         BUN 28 (H) mg/dL         Creatinine 1.26 (H) mg/dL         Sodium 129 (L) mmol/L         Potassium 5.3 (H) mmol/L         Chloride 84 (L) mmol/L         CO2 19.1 (L) mmol/L         Calcium 10.8 (H) mg/dL         Total Protein 8.8 (H) g/dL         Albumin 4.40 g/dL         ALT (SGPT) 21 U/L         AST (SGOT) 16 U/L         Alkaline Phosphatase 99 U/L         Total Bilirubin 0.6 mg/dL         eGFR Non  Amer 41 (L) mL/min/1.73         Globulin 4.4 gm/dL         A/G Ratio 1.0 g/dL         BUN/Creatinine Ratio 22.2        Anion Gap 25.9 mmol/L        Narrative:         The MDRD GFR formula is only valid for adults with stable renal function between ages 18 and 70.       Protime-INR [[id number]] (Abnormal) Collected: 12/15/17 1811       Lab Status: Final result Specimen: Blood Updated: 12/15/17 1854        Protime 14.2 Seconds         INR 1.14 (H)       CBC Auto Differential [[id number]] (Abnormal) Collected: 12/15/17 1811       Lab Status: Final result Specimen: Blood Updated: 12/15/17 1831        WBC 11.60 (H) 10*3/mm3         RBC 4.95 10*6/mm3         Hemoglobin 14.3 g/dL         Hematocrit 44.0 %         MCV 88.9 fL         MCH 28.9 pg         MCHC 32.5 g/dL         RDW 15.5 (H) %         RDW-SD 50.3 fl         MPV 11.1 fL         Platelets 453 10*3/mm3         Neutrophil % 77.3 (H) %         Lymphocyte % 16.3 (L) %         Monocyte % 4.5 (L) %         Eosinophil % 0.7 %         Basophil % 0.8 %         Immature Grans % 0.4 %         Neutrophils, Absolute 8.97 (H) 10*3/mm3         Lymphocytes, Absolute 1.89 10*3/mm3         Monocytes, Absolute 0.52 10*3/mm3         Eosinophils, Absolute 0.08 10*3/mm3         Basophils, Absolute 0.09 10*3/mm3         Immature Grans, Absolute 0.05 (H) 10*3/mm3        Lactic Acid, Plasma  [[id number]] (Abnormal) Collected: 12/15/17 1811       Lab Status: Final result Specimen: Blood Updated: 12/15/17 1857        Lactate 10.4 (C) mmol/L        Lactic Acid, Reflex Timer [[id number]] Collected: 12/15/17 1811       Lab Status: In process Specimen: Blood Updated: 12/15/17 1857       Blood Gas, Arterial [[id number]] (Abnormal) Collected: 12/15/17 1805       Lab Status: Final result Specimen: Arterial Blood Updated: 12/15/17 1811        Site Arterial: right brachial        Bebeto's Test Positive        pH, Arterial 7.345 (L) pH units         pCO2, Arterial 37.4 mm Hg         pO2, Arterial 178.4 (H) mm Hg         HCO3, Arterial 20.4 (L) mmol/L         Base Excess, Arterial -4.7 (L) mmol/L         O2 Saturation Calculated 99.5 (H) %         Barometric Pressure for Blood Gas 751.1 mmHg         Modality NRB        Flow Rate 15 lpm         Ventilator Mode n/a        Rate 15 Breaths/minute        Narrative:         sat 100% Meter: [id number] : [id number] Usama Pimentel       Blood Gas, Arterial [[id number]]        Lab Status: No result Specimen: Arterial Blood        Urinalysis With / Culture If Indicated - Urine, Clean Catch [[id number]]        Lab Status: No result Specimen: Urine from Urine, Clean Catch               CT head without contrast and CT cervical spine reviewed: No evidence of acute cranial process.  Diffuse osteopenia and spondylosis with multiple chronic compression deformities noted on cervical spine.  No acute compression or traumatic malalignment.    Assessment:  Acute new onset seizure  Hyponatremia  Hyperglycemia  Lactic acidosis  Altered mental status/metabolic encephalopathy    Plan:  Admit to the intensive care unit.  Neuro checks per protocol.  Continue Keppra.  Neurology consultation.  Electrolytes in hyperglycemia will be corrected.  Recheck lactic acidosis.  I suspect her lactic acid level is secondary to seizure.  Check CXR for evaluation of possible aspiration as found down  unresponsive.  DVT prophylaxis    Dung Isbell MD  [facility], [facility]  Pulmonary and Critical Care Medicine    12/15/2017  7:30 PM

## 2017-12-16 NOTE — CONSULTS
NEUROLOGY CONSULTATION        PATIENT Halie Guidry    1940   MRN [id number]   ADMIT DATE 12/15/2017   LENGTH OF STAY 1 days   ATTENDING Dung Isbell MD       HISTORY OF PRESENT ILLNESS:  This is 77-year-old woman admitted for seizure.  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  When she didn't come out they found that she was jerking.  The EMTs were called she was brought to the hospital.  She is not seizing when she arrived at Hospital but she was encephalopathic.  However she had a witnessed generalized tonic-clonic seizure in the emergency room.  She is given again a gram of Keppra in the emergency room and put on 500 every 12 since then.  She's had no seizures in the CCU.  She did awake but encephalopathic since she's been here with no significant change.  No focal weakness has been noted.  No pain or visual complaints have been reported.  The patient cannot give us any history at this point.  By chart review there is no history of seizure in the past she's never the stroke or any other known neurologic problems.    CHIEF COMPLAINT: Loss of Consciousness      DIAGNOSIS: Lactic acidosis [E87.2]  Seizures [R56.9]  Hyperglycemia [R73.9]  Altered mental status, unspecified altered mental status type [R41.82]      PAST MEDICAL HISTORY:   Past Medical History:   Diagnosis Date   • Arthritis    • Benign essential hypertension 2014   • Bleeding disorder    • Diabetes mellitus, type 2    • Disc degeneration, lumbar    • Hyperlipidemia    • Hypothyroidism    • Neuropathy    • Osteoporosis 2015   • Rotator cuff tear, left    • Scoliosis    • Shoulder pain     LEFT, TORN ROTATOR CUFF S/P FALL   • Spinal stenosis        PAST SURGICAL HISTORY:  Past Surgical History:   Procedure Laterality Date   • APPENDECTOMY     • BILATERAL BREAST REDUCTION Bilateral 2015   • CATARACT EXTRACTION  2015   • COLONOSCOPY  2015    WNL   • KYPHOPLASTY     • REDUCTION  "MAMMAPLASTY     • TONSILLECTOMY         CURRENT MEDICATIONS:  Scheduled Medications:  enoxaparin 40 mg Subcutaneous Q24H   insulin aspart 0-24 Units Subcutaneous 4x Daily With Meals & Nightly   insulin detemir 56 Units Subcutaneous Nightly   levETIRAcetam 500 mg Intravenous Q12H   levothyroxine sodium 88 mcg Intravenous Daily   metoprolol tartrate 5 mg Intravenous Q6H     Infusions:   sodium chloride 125 mL/hr Last Rate: 125 mL/hr (12/16/17 0318)     PRN Medications:  •  acetaminophen **OR** acetaminophen  •  aluminum-magnesium hydroxide-simethicone  •  bisacodyl  •  bisacodyl  •  dextrose  •  dextrose  •  dextrose  •  dextrose  •  glucagon (human recombinant)  •  glucagon (human recombinant)  •  hydrALAZINE  •  ipratropium-albuterol  •  LORazepam **OR** LORazepam  •  LORazepam  •  ondansetron **OR** ondansetron ODT **OR** ondansetron  •  sodium chloride  •  sodium chloride    SOCIAL HISTORY:  Social History     Social History   • Marital status:      Spouse name: N/A   • Number of children: N/A   • Years of education: N/A     Social History Main Topics   • Smoking status: Former Smoker     Packs/day: 1.00     Quit date: 2013   • Smokeless tobacco: Never Used   • Alcohol use No   • Drug use: No   • Sexual activity: Defer     Other Topics Concern   • None     Social History Narrative       FAMILY HISTORY:   I have reviewed this patient's family history and it is not significant to the present illness.      REVIEW OF SYSTEMS: 14 system review performed and all other systems are negative except for Loss of Consciousness         PHYSICAL EXAM:  GENERAL: Reveals a man/woman who appears his/her stated age, in no acute distress.  VITAL SIGNS: /57  Pulse 76  Temp 98.1 °F (36.7 °C) (Oral)   Resp 18  Ht 167.6 cm (66\")  Wt 86.2 kg (190 lb)  SpO2 99%  BMI 30.67 kg/m2  NECK: Carotids are equal without bruits.   HEART: Regular rate and rhythm with no significant murmur or gallop.   EXTREMITIES: Nonedematous. " Pulses are intact. There is no rash. There is no hepatosplenomegaly. No respiratory distress. There is no lymphadenopathy. There are no signs of cutaneous embolization.  NEUROLOGIC: Awake, alert and oriented x3. There is no aphasia or dysarthria.  Patient can do simple calculations and remembers two out of three objects in five minutes. Visual fields are full. Disks are flat. Cranial nerves II through XII are intact. Power is normal in all 4 extremities. Tone is normal. Reflexes are 2 and symmetric in the upper and lower extremities. Toes are downgoing. Sensations intact pinprick and joint position sense in all 4 extremities. Cerebellar function and gait are normal.      DIAGNOSTIC DATA:   Labs reviewed.  Of note his blood sugar 768.  Weight of 20 8 PM of 129 on admission and this results in a calculated osmolarity of 350.  At July 17 for since then and in particular her most recent blood sugars 128.  As of note that hypothyroidism apparently accounted for an admission in June of this year for weakness and confusion which were self-limited.     Radiology images personally reviewed and revealed no significant abnormalities.  Specifically I reviewed the images of the CT scan of brain which were unremarkable.      IMPRESSION: In all likelihood this is diabetic hyperosmolar state resulting in seizure.  Calculated osmolarity of 350 is high enough to induce neurologic symptoms.  Though hyperosmolar state resolved as per labs, I dont expect the neurologic sequela to necessarily resolve immediately and also could be component of post ictal state.     For now I think we should assume that this is a problem but if she doesn't come around more completely and next 24 hours we may need to consider other possibilities.  For Now I can't continue the Keppra and supportive care and I'll follow her with you.    Thank you for allowing me to see this patient.    Ziggy Ford MD  12/16/2017  12:59 PM

## 2017-12-16 NOTE — PROGRESS NOTES
[facility]     Mar/chart reviewed  F/u new onset seizure.  No further sz activity    Vital Sign Min/Max for last 24 hours  Temp  Min: 95.9 °F (35.5 °C)  Max: 98.1 °F (36.7 °C)   BP  Min: 121/70  Max: 226/113   Pulse  Min: 73  Max: 127   Resp  Min: 12  Max: 26   SpO2  Min: 95 %  Max: 100 %   Flow (L/min)  Min: 5  Max: 15   Weight  Min: 86.2 kg (190 lb)  Max: 86.2 kg (190 lb)     Appears ill,non verbal  perrl, eomi, no icterus,  mmm, no jvd, trachea midline, neck supple,  chest cta bilaterally, no crackles, no wheezes,   rrr,   soft, nt, nd +bs,  no c/c/ e    Labs:  Wbc 19  hgb 13  plts 436  Cr 0.84  Bicarb 23  Lactate 2.7  Cr 0.98  Bicarb 22.7  Glucose 219    Radiology: reviewed    A/P:  1. New onset seizure -- continue keppra, await neurology recommendations  2. Hyperglycemia -- improving -- ssi, levemir  3. Leukocytosis -- suspect reactive, awaiting u/a  4. Altered mental status -- till abnormal    Suspect she will need mri if able to cooperate.  D/w RN and with son

## 2017-12-16 NOTE — THERAPY EVALUATION
Acute Care - Speech Language Pathology   Swallow Initial Evaluation [facility]     Patient Name: Halie Guidry  : 1940  MRN: [id number]  Today's Date: 2017               Admit Date: 12/15/2017      SPEECH-LANGUAGE PATHOLOGY EVALUATION - SWALLOW  Subjective: The patient was seen on this date for a Clinical Swallow evaluation.  Patient was alert and cooperative.  Significant history: found down w/sz like activity. Hx: DM, OA  Objective: Textures given included ice, thin liquid, nectar thick liquid, honey thick liquid and puree consistency.  Assessment: Difficulties were noted with none of the above consistencies.  Observations: Pt required mod cueing to maintain alertness throughout evaluation; however, no overt s/s of aspiration with thin or puree.   SLP Findings:  Patient presents with moderate oral dysphagia, secondary to poor alertness. without esophageal component.   Recommendations: Diet Textures: thin liquid, puree consistency food.  Medications should be taken crushed with puree.   Recommended Strategies: Upright for PO, small bites and sips and supervision with all PO. Only feed when pt is alert. Oral care before breakfast, after all meals and PRN.  Other Recommended Evaluations: Re-evaluation at bedside for possible upgrade as alertness improves.  Dysphagia therapy is recommended. Rationale: diet tolerance/upgrade as alertness improves.          Visit Dx:     ICD-10-CM ICD-9-CM   1. Altered mental status, unspecified altered mental status type R41.82 780.97   2. Seizures R56.9 780.39   3. Hyperglycemia R73.9 790.29   4. Lactic acidosis E87.2 276.2     Patient Active Problem List   Diagnosis   • Compression fracture   • Lumbar degenerative disc disease   • Diabetes mellitus, type 2   • Hyperlipidemia   • Benign essential hypertension   • Hypothyroidism   • Leukocytosis   • Neuropathy   • Osteoporosis   • Proteinuria   • Tobacco abuse   • Diabetic eye exam   • Generalized weakness   •  Spinal stenosis   • Scoliosis   • Arthritis   • VBI (vertebrobasilar insufficiency)   • Orthostatic hypotension   • 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   • Severe hypothyroidism   • Noncompliance   • Vitamin D deficiency disease   • Altered mental status     Past Medical History:   Diagnosis Date   • Arthritis    • Benign essential hypertension 08/20/2014   • Bleeding disorder    • Diabetes mellitus, type 2    • Disc degeneration, lumbar    • Hyperlipidemia    • Hypothyroidism    • Neuropathy    • Osteoporosis 09/09/2015   • Rotator cuff tear, left    • Scoliosis    • Shoulder pain     LEFT, TORN ROTATOR CUFF S/P FALL   • Spinal stenosis      Past Surgical History:   Procedure Laterality Date   • APPENDECTOMY     • BILATERAL BREAST REDUCTION Bilateral 08/2015   • CATARACT EXTRACTION  03/2015   • COLONOSCOPY  06/05/2015    WNL   • KYPHOPLASTY     • REDUCTION MAMMAPLASTY     • TONSILLECTOMY            SWALLOW EVALUATION (last 72 hours)      Swallow Evaluation       12/16/17 0900                Rehab Evaluation    Document Type evaluation  -LT        Subjective Information no complaints  -LT        Symptoms Noted During/After Treatment fatigue  -LT        General Information    Patient Profile Review yes  -LT        Plans/Goals Discussed With patient and family  -LT        Barriers to Rehab medically complex  -LT        Clinical Impression    Patient's Goals For Discharge patient could not state  -LT        Family Goals For Discharge patient able to return to PO diet  -LT        Rehab Potential/Prognosis, Swallowing fair, will monitor progress closely  -LT        Criteria for Skilled Therapeutic Interventions Met skilled criteria for dysphagia intervention met  -LT        Therapy Frequency PRN  -LT        Predicted Duration Therapy Interv (days) until discharge  -LT        Pain Assessment    Pain Assessment No/denies pain  -LT        Cognitive Assessment/Intervention    Current  Cognitive/Communication Assessment impaired  -LT        Orientation Status oriented to;person  -LT        Follows Commands/Answers Questions needs repetition;needs increased time;25% of the time  -LT        Oral Motor Structure and Function    Oral Motor Anatomy and Physiology patient demonstrates anatomy that is WNL  -LT        Dentition Assessment missing teeth;upper dentures/partial in place   lower mostly edentulous  -LT        Oral Musculature General Assessment --   generalized weakness  -LT          User Key  (r) = Recorded By, (t) = Taken By, (c) = Cosigned By    Initials Name Effective Dates    LT Ileana Barrera MS CCC-SLP 04/13/15 -         EDUCATION  The patient has been educated in the following areas:   Dysphagia (Swallowing Impairment).    SLP Recommendation and Plan                    Criteria for Skilled Therapeutic Interventions Met: skilled criteria for dysphagia intervention met     Rehab Potential/Prognosis, Swallowing: fair, will monitor progress closely  Therapy Frequency: PRN                        IP SLP Goals       12/16/17 0958          Safely Consume Diet    Safely Consume Diet- SLP, Time to Achieve by discharge  -LT      Safely Consume Diet- SLP, Additional Goal puree/thin  -LT      Safely Consume Diet- SLP, Outcome goal ongoing  -LT        User Key  (r) = Recorded By, (t) = Taken By, (c) = Cosigned By    Initials Name Provider Type    LT Ileana Barrera MS CCC-SLP Speech and Language Pathologist             SLP Outcome Measures (last 72 hours)      SLP Outcome Measures       12/16/17 0900          SLP Outcome Measures    Outcome Measure Used? Adult NOMS  -LT      FCM Scores    FCM Chosen Swallowing  -LT      Swallowing FCM Score 4  -LT        User Key  (r) = Recorded By, (t) = Taken By, (c) = Cosigned By    Initials Name Effective Dates    LT Ileana Barrera MS CCC-SLP 04/13/15 -            Time Calculation:         Time Calculation- SLP       12/16/17 1000          Time Calculation- SLP     SLP Received On 12/16/17  -LT        User Key  (r) = Recorded By, (t) = Taken By, (c) = Cosigned By    Initials Name Provider Type    LT Ileana Barrera MS CCC-SLP Speech and Language Pathologist          Therapy Charges for Today     Code Description Service Date Service Provider Modifiers Qty    [id number]  ST EVAL ORAL PHARYNG SWALLOW 3 12/16/2017 Ileana Barrera MS CCC-SLP GN 1               Ileana Barrera MS CCC-SLP  12/16/2017

## 2017-12-16 NOTE — PLAN OF CARE
Problem: Inpatient SLP  Goal: Dysphagia- Patient will safely consume diet as per recommendation with no signs/symptoms of aspiration    12/16/17 0958   Safely Consume Diet   Safely Consume Diet- SLP, Time to Achieve by discharge   Safely Consume Diet- SLP, Additional Goal puree/thin   Safely Consume Diet- SLP, Outcome goal ongoing

## 2017-12-17 ENCOUNTER — APPOINTMENT (OUTPATIENT)
Dept: MRI IMAGING | Facility: HOSPITAL | Age: 77
End: 2017-12-17

## 2017-12-17 LAB
BACTERIA UR QL AUTO: NORMAL /HPF
BILIRUB UR QL STRIP: NEGATIVE
CLARITY UR: CLEAR
COLOR UR: YELLOW
GLUCOSE BLDC GLUCOMTR-MCNC: 162 MG/DL (ref 70–130)
GLUCOSE BLDC GLUCOMTR-MCNC: 175 MG/DL (ref 70–130)
GLUCOSE BLDC GLUCOMTR-MCNC: 208 MG/DL (ref 70–130)
GLUCOSE BLDC GLUCOMTR-MCNC: 251 MG/DL (ref 70–130)
GLUCOSE BLDC GLUCOMTR-MCNC: 282 MG/DL (ref 70–130)
GLUCOSE BLDC GLUCOMTR-MCNC: 286 MG/DL (ref 70–130)
GLUCOSE UR STRIP-MCNC: ABNORMAL MG/DL
HGB UR QL STRIP.AUTO: NEGATIVE
HYALINE CASTS UR QL AUTO: NORMAL /LPF
KETONES UR QL STRIP: ABNORMAL
LEUKOCYTE ESTERASE UR QL STRIP.AUTO: NEGATIVE
NITRITE UR QL STRIP: NEGATIVE
PH UR STRIP.AUTO: 6 [PH] (ref 5–8)
PROT UR QL STRIP: ABNORMAL
RBC # UR: NORMAL /HPF
REF LAB TEST METHOD: NORMAL
SP GR UR STRIP: 1.02 (ref 1–1.03)
SQUAMOUS #/AREA URNS HPF: NORMAL /HPF
T4 FREE SERPL-MCNC: 1.12 NG/DL (ref 0.93–1.7)
TSH SERPL DL<=0.05 MIU/L-ACNC: 8.16 MIU/ML (ref 0.27–4.2)
UROBILINOGEN UR QL STRIP: ABNORMAL
WBC UR QL AUTO: NORMAL /HPF

## 2017-12-17 PROCEDURE — 81001 URINALYSIS AUTO W/SCOPE: CPT | Performed by: EMERGENCY MEDICINE

## 2017-12-17 PROCEDURE — 25010000002 LEVETIRACETAM IN NACL 0.82% 500 MG/100ML SOLUTION: Performed by: INTERNAL MEDICINE

## 2017-12-17 PROCEDURE — 99233 SBSQ HOSP IP/OBS HIGH 50: CPT | Performed by: PSYCHIATRY & NEUROLOGY

## 2017-12-17 PROCEDURE — 25010000002 ENOXAPARIN PER 10 MG: Performed by: INTERNAL MEDICINE

## 2017-12-17 PROCEDURE — 82962 GLUCOSE BLOOD TEST: CPT

## 2017-12-17 PROCEDURE — 94799 UNLISTED PULMONARY SVC/PX: CPT

## 2017-12-17 PROCEDURE — 25010000002 LORAZEPAM PER 2 MG: Performed by: INTERNAL MEDICINE

## 2017-12-17 PROCEDURE — 63710000001 INSULIN DETEMER PER 5 UNITS: Performed by: INTERNAL MEDICINE

## 2017-12-17 PROCEDURE — 25010000002 HYDRALAZINE PER 20 MG: Performed by: INTERNAL MEDICINE

## 2017-12-17 PROCEDURE — 99222 1ST HOSP IP/OBS MODERATE 55: CPT | Performed by: INTERNAL MEDICINE

## 2017-12-17 PROCEDURE — 63710000001 INSULIN ASPART PER 5 UNITS: Performed by: INTERNAL MEDICINE

## 2017-12-17 PROCEDURE — 70551 MRI BRAIN STEM W/O DYE: CPT

## 2017-12-17 RX ORDER — LORAZEPAM 1 MG/1
2 TABLET ORAL ONCE
Status: DISCONTINUED | OUTPATIENT
Start: 2017-12-17 | End: 2017-12-17

## 2017-12-17 RX ADMIN — METOPROLOL TARTRATE 5 MG: 5 INJECTION, SOLUTION INTRAVENOUS at 04:00

## 2017-12-17 RX ADMIN — INSULIN ASPART 4 UNITS: 100 INJECTION, SOLUTION INTRAVENOUS; SUBCUTANEOUS at 17:28

## 2017-12-17 RX ADMIN — METOPROLOL TARTRATE 5 MG: 5 INJECTION, SOLUTION INTRAVENOUS at 12:14

## 2017-12-17 RX ADMIN — LEVETIRACETAM 500 MG: 5 INJECTION INTRAVENOUS at 08:00

## 2017-12-17 RX ADMIN — LEVETIRACETAM 500 MG: 5 INJECTION INTRAVENOUS at 20:23

## 2017-12-17 RX ADMIN — INSULIN ASPART 12 UNITS: 100 INJECTION, SOLUTION INTRAVENOUS; SUBCUTANEOUS at 08:01

## 2017-12-17 RX ADMIN — LEVOTHYROXINE SODIUM ANHYDROUS 88 MCG: 100 INJECTION, POWDER, LYOPHILIZED, FOR SOLUTION INTRAVENOUS at 10:20

## 2017-12-17 RX ADMIN — LORAZEPAM 1 MG: 4 INJECTION, SOLUTION INTRAMUSCULAR; INTRAVENOUS at 15:09

## 2017-12-17 RX ADMIN — HYDRALAZINE HYDROCHLORIDE 20 MG: 20 INJECTION INTRAMUSCULAR; INTRAVENOUS at 00:30

## 2017-12-17 RX ADMIN — ENOXAPARIN SODIUM 40 MG: 40 INJECTION SUBCUTANEOUS at 20:23

## 2017-12-17 RX ADMIN — INSULIN ASPART 8 UNITS: 100 INJECTION, SOLUTION INTRAVENOUS; SUBCUTANEOUS at 20:22

## 2017-12-17 RX ADMIN — INSULIN DETEMIR 60 UNITS: 100 INJECTION, SOLUTION SUBCUTANEOUS at 20:30

## 2017-12-17 RX ADMIN — METOPROLOL TARTRATE 5 MG: 5 INJECTION, SOLUTION INTRAVENOUS at 17:27

## 2017-12-17 RX ADMIN — ACETAMINOPHEN 650 MG: 325 TABLET ORAL at 15:08

## 2017-12-17 RX ADMIN — INSULIN ASPART 12 UNITS: 100 INJECTION, SOLUTION INTRAVENOUS; SUBCUTANEOUS at 12:14

## 2017-12-17 NOTE — CONSULTS
DATE OF CONSULTATION:  12/17/2017    REQUESTING PHYSICIAN:  Kt Lobato MD    REASON FOR CONSULTATION:  Treatment of diabetes mellitus.    HISTORY OF PRESENT ILLNESS:  The patient is a 77-year-old female who was admitted to the hospital on 12/15/2017 because of seizures, which was witnessed.  At the emergency room, she was found to be markedly hyperglycemic with a blood sugar of 768.  She was given IV fluids and started on insulin.  Hemoglobin A1c on admission was markedly elevated at 11.52%, which has increased from 10.16, which was done on 10/31/2017.    There is no history available from the patient because of confusion.  The chart was reviewed.    She has known diabetes of unknown duration and was last seen by Dr. Barrow in November 2017.  At that time, she was supposed to increase her Levemir to 58 units and 30 units every morning and increase the NovoLog to 15 units with each meal.  Metformin was discontinued at that time.    She has proteinuria on urine sample taken October 2017.  She does not recall when her last eye examination.  She denies any numbness or tingling in her hands or feet.    She has hypothyroidism and her levothyroxine dose was increased to 175 mcg per day in November 2017.  She has been started on levothyroxine 88 mcg IV on 12/15/2017.    PAST MEDICAL HISTORY:  1.  Hypertension.  2.  Arthritis.  3.  Lumbar degenerative disc disease.  4.  Hyperlipidemia.  She is on Lipitor 80 mg once a day.  5.  She has history of neuropathy.  6.  She has osteoporosis.  7.  She has left rotator cuff tear with chronic pain.  8.  History of spinal stenosis, scoliosis.  9.  Previous appendectomy.  10.  Bilateral breast reduction.    PAST SURGICAL HISTORY:  1.  She had a normal colonoscopy in June 2015.  2.  She has had a kyphoplasty and tonsillectomy.    ALLERGIES:  SULFA.    SOCIAL HISTORY:  The patient lives alone.  She quit 1 pack of cigarettes per day.  She quit smoking in 2013.  No history of alcohol or  drug abuse.    FAMILY HISTORY:  Mother had bone cancer.    REVIEW OF SYSTEMS:  Unable to obtain due to confusion.    PHYSICAL EXAMINATION:  VITAL SIGNS:  Blood pressure 180/80, respiratory rate 18, heart rate 86, temperature 97.9.  GENERAL:  The patient is awake, alert, disoriented to time and place, oriented to person.  HEENT:  Pink conjunctivae.  Sclerae is anicteric.  No xanthelasma.  No facial asymmetry.  Tongue is midline.  No pharyngeal congestion.  There is speech perseverance.  No carotid bruits.  Thyroid is not enlarged.  No cervical lymphadenopathy.  Neck veins are not visible at 30 degrees.  LUNGS:  Equal chest expansion.  No rales.  No wheezes.  HEART:  Regular heart rate and rhythm.  No gallop.  ABDOMEN:  Soft, nontender.  Bowel sounds are active.  EXTREMITIES:  Warm.  No cyanosis, pedal edema or clubbing.  Light touch sensation is diminished in the distal lower extremities.    IMPRESSION:  1.  Metabolic encephalopathy.  2.  Type 2 diabetes mellitus, poorly controlled, suspect noncompliance.  3.  Hypothyroidism.  4.  Hyperlipidemia.  5.  Hypertension.    RECOMMENDATIONS:  Increase Levemir to 60 units every evening.  Continue NovoLog sliding scale.  Continue IV levothyroxine 88 mcg per day.  I have ordered for a free T4 today.  Check lipid profile in the morning and consider restarting Lipitor.  Will defer blood pressure control to the intensivist.    Thank you for the referral.  Dr. Barrow will follow the patient starting tomorrow.

## 2017-12-17 NOTE — PROGRESS NOTES
"Progress Note      PATIENT Halie Guidry    1940   MRN [id number]   ADMIT DATE 12/15/2017   LENGTH OF STAY 2 days   ATTENDING Dugn Isbell MD       CHIEF COMPLAINT: Loss of Consciousness      DIAGNOSIS: Lactic acidosis [E87.2]  Seizures [R56.9]  Hyperglycemia [R73.9]  Altered mental status, unspecified altered mental status type [R41.82]    HISTORY OF PRESENT ILLNESS: The nurses report ordered a marked improvement.  Apparently she has a friend in today and was chatting with her.  No further seizures have been seen.  The patient voices no complaints.    PHYSICAL EXAMINATION:  GENERAL: Reveals a man/woman who appears his/her stated age, in no acute distress.  VITAL SIGNS: /64  Pulse 85  Temp 97.9 °F (36.6 °C)  Resp 20  Ht 167.6 cm (66\")  Wt 86.2 kg (190 lb)  SpO2 98%  BMI 30.67 kg/m2  NECK: Carotids are equal without bruits.   HEART: Regular rate and rhythm with no significant murmur or gallop.   EXTREMITIES: Nonedematous. Pulses are intact. There is no rash. There is no hepatosplenomegaly.   NEUROLOGIC: He is awake and alert.  She knew she was in the hospital.  She then said she was in [place].  However no matter how many times I asked her to stay she kept saying she was in [place].  He did not know the day of the week.  She will perseverate at times and say [place] when I was asking her a totally different question.  She could follow simple commands.  I've made some complex she had difficulty with and then would perseverate with him.  He named objects well.  She could repeat well although she would occasionally put in extra words.  She has decreased left nasolabial fold visual fields are full otherwise cranial nerves are intact she appears to have weakness in the left arm she definitely doesn't move the left as well as her right.  It is possible she has a shoulder problem but there is no crepitation or pain with movement.  She cooperated poorly for muscle testing.  I pushed " her hands towards her nose she would resist with her triceps and the left was clearly weak as opposed to the right.  She moves the right leg more quickly than the left this difference is subtle.  The right toe is downgoing the left is equivocal.    DIAGNOSTIC DATA: Her lab work shows mildly elevated glucose but otherwise has normalized.    IMPRESSION AND PLAN: The patient has had no further seizures in her cognitive function has definitely improved.  She is not back to baseline.  She still perseverates and have any aphasia.  There is possible that the language difficulty as part of an encephalopathy.  However she does also appear to have left arm weakness.  One can see focality with hyperosmolar states but at this point I feel that would be unusual.  Also think it would be atypical to have a Kirby's paralysis this long.  Don't think this is all explained by shoulder pathology but I can't exclude that.  Concerned she may have had either an old or new stroke.  There could've had lead to pain or taking her insulin poorly and her presentation with severe hyperglycemia theoretically.    The family members, and will ask her about the left arm and whether she is right or left-handed.  However I'm going to go ahead and get an MRI of the brain.  Continue the Keppra for now.        Ziggy Ford MD  12/17/2017  11:55 AM

## 2017-12-17 NOTE — PROGRESS NOTES
[facility]      Mar/chart reviewed  F/u new onset seizure.  No further sz activity    Vital Sign Min/Max for last 24 hours  Temp  Min: 98.5 °F (36.9 °C)  Max: 99.4 °F (37.4 °C)   BP  Min: 101/58  Max: 213/100   Pulse  Min: 72  Max: 114   Resp  Min: 19  Max: 20   SpO2  Min: 93 %  Max: 99 %   Flow (L/min)  Min: 2  Max: 2   No Data Recorded     appears ill, alert, more oreinted  perrl, eomi, no icterus,  mmm, no jvd, trachea midline, neck supple,  chest cta bilaterally, no crackles, no wheezes,   rrr,   soft, nt, nd +bs,  no c/c/ e    Labs:  Wbc 19.8  hgb 13  plts 436  Glucose 219  Cr 0.84  Bicarb 23    A/P:  1. New onset seizure -- continue keppra, likely was due to hyperglycemia  2. Hyperglycemia -- improving -- ssi, levemir  3. Leukocytosis -- suspect reactive, awaiting u/a  4. Altered mental status -- still abnormal, but greatly improved today    Will get endocrine to see her, she can move out of ICU  Hopefully home tomorrow

## 2017-12-18 LAB
25(OH)D3 SERPL-MCNC: 33.2 NG/ML (ref 30–100)
CHOLEST SERPL-MCNC: 238 MG/DL (ref 0–200)
GLUCOSE BLDC GLUCOMTR-MCNC: 114 MG/DL (ref 70–130)
GLUCOSE BLDC GLUCOMTR-MCNC: 119 MG/DL (ref 70–130)
GLUCOSE BLDC GLUCOMTR-MCNC: 186 MG/DL (ref 70–130)
GLUCOSE BLDC GLUCOMTR-MCNC: 186 MG/DL (ref 70–130)
GLUCOSE BLDC GLUCOMTR-MCNC: 221 MG/DL (ref 70–130)
HDLC SERPL-MCNC: 36 MG/DL (ref 40–60)
LDLC SERPL CALC-MCNC: 149 MG/DL (ref 0–100)
LDLC/HDLC SERPL: 4.14 {RATIO}
TRIGL SERPL-MCNC: 265 MG/DL (ref 0–150)
VIT B12 BLD-MCNC: 715 PG/ML (ref 211–946)
VLDLC SERPL-MCNC: 53 MG/DL (ref 5–40)

## 2017-12-18 PROCEDURE — 99233 SBSQ HOSP IP/OBS HIGH 50: CPT | Performed by: INTERNAL MEDICINE

## 2017-12-18 PROCEDURE — 82306 VITAMIN D 25 HYDROXY: CPT | Performed by: INTERNAL MEDICINE

## 2017-12-18 PROCEDURE — 82962 GLUCOSE BLOOD TEST: CPT

## 2017-12-18 PROCEDURE — 82607 VITAMIN B-12: CPT | Performed by: INTERNAL MEDICINE

## 2017-12-18 PROCEDURE — 99231 SBSQ HOSP IP/OBS SF/LOW 25: CPT | Performed by: PSYCHIATRY & NEUROLOGY

## 2017-12-18 PROCEDURE — 84681 ASSAY OF C-PEPTIDE: CPT | Performed by: INTERNAL MEDICINE

## 2017-12-18 PROCEDURE — 86341 ISLET CELL ANTIBODY: CPT | Performed by: INTERNAL MEDICINE

## 2017-12-18 PROCEDURE — 92526 ORAL FUNCTION THERAPY: CPT

## 2017-12-18 PROCEDURE — 86340 INTRINSIC FACTOR ANTIBODY: CPT | Performed by: INTERNAL MEDICINE

## 2017-12-18 PROCEDURE — 80061 LIPID PANEL: CPT | Performed by: INTERNAL MEDICINE

## 2017-12-18 PROCEDURE — 25010000002 LEVETIRACETAM IN NACL 0.82% 500 MG/100ML SOLUTION: Performed by: INTERNAL MEDICINE

## 2017-12-18 PROCEDURE — 63710000001 INSULIN ASPART PER 5 UNITS: Performed by: INTERNAL MEDICINE

## 2017-12-18 PROCEDURE — 97162 PT EVAL MOD COMPLEX 30 MIN: CPT

## 2017-12-18 PROCEDURE — 97110 THERAPEUTIC EXERCISES: CPT

## 2017-12-18 PROCEDURE — 25010000002 ENOXAPARIN PER 10 MG: Performed by: INTERNAL MEDICINE

## 2017-12-18 RX ORDER — LEVOTHYROXINE SODIUM 0.15 MG/1
150 TABLET ORAL
Status: DISCONTINUED | OUTPATIENT
Start: 2017-12-19 | End: 2017-12-19 | Stop reason: HOSPADM

## 2017-12-18 RX ORDER — ATORVASTATIN CALCIUM 20 MG/1
40 TABLET, FILM COATED ORAL NIGHTLY
Status: DISCONTINUED | OUTPATIENT
Start: 2017-12-18 | End: 2017-12-19 | Stop reason: HOSPADM

## 2017-12-18 RX ADMIN — INSULIN ASPART 8 UNITS: 100 INJECTION, SOLUTION INTRAVENOUS; SUBCUTANEOUS at 17:31

## 2017-12-18 RX ADMIN — ACETAMINOPHEN 650 MG: 325 TABLET ORAL at 22:40

## 2017-12-18 RX ADMIN — LEVETIRACETAM 500 MG: 5 INJECTION INTRAVENOUS at 10:18

## 2017-12-18 RX ADMIN — INSULIN ASPART 4 UNITS: 100 INJECTION, SOLUTION INTRAVENOUS; SUBCUTANEOUS at 22:30

## 2017-12-18 RX ADMIN — ATORVASTATIN CALCIUM 40 MG: 20 TABLET, FILM COATED ORAL at 21:02

## 2017-12-18 RX ADMIN — METOPROLOL TARTRATE 5 MG: 5 INJECTION, SOLUTION INTRAVENOUS at 17:32

## 2017-12-18 RX ADMIN — METOPROLOL TARTRATE 5 MG: 5 INJECTION, SOLUTION INTRAVENOUS at 10:20

## 2017-12-18 RX ADMIN — INSULIN ASPART 4 UNITS: 100 INJECTION, SOLUTION INTRAVENOUS; SUBCUTANEOUS at 12:13

## 2017-12-18 RX ADMIN — METOPROLOL TARTRATE 5 MG: 5 INJECTION, SOLUTION INTRAVENOUS at 04:15

## 2017-12-18 RX ADMIN — LEVOTHYROXINE SODIUM ANHYDROUS 88 MCG: 100 INJECTION, POWDER, LYOPHILIZED, FOR SOLUTION INTRAVENOUS at 10:20

## 2017-12-18 RX ADMIN — ACETAMINOPHEN 650 MG: 325 TABLET ORAL at 04:14

## 2017-12-18 RX ADMIN — LEVETIRACETAM 500 MG: 5 INJECTION INTRAVENOUS at 21:03

## 2017-12-18 RX ADMIN — INSULIN DETEMIR 60 UNITS: 100 INJECTION, SOLUTION SUBCUTANEOUS at 22:31

## 2017-12-18 RX ADMIN — METOPROLOL TARTRATE 5 MG: 5 INJECTION, SOLUTION INTRAVENOUS at 21:03

## 2017-12-18 RX ADMIN — ENOXAPARIN SODIUM 40 MG: 40 INJECTION SUBCUTANEOUS at 21:02

## 2017-12-18 RX ADMIN — INSULIN ASPART 7 UNITS: 100 INJECTION, SOLUTION INTRAVENOUS; SUBCUTANEOUS at 17:32

## 2017-12-18 NOTE — DISCHARGE SUMMARY
[facility]    Admit date: 12/15/2017  Discharge date: 12/19/2017    Admission/discharge diagnosis:  1. New onset seizure --  likely was due to hyperglycemia  2. Hyperglycemia --   3. Leukocytosis --   4. Altered mental status   5. DMII -- poorly controlled    HPI:  77-year-old white female brought in by paramedics after seizure-like activity at the hair salon earlier today.  Was found down by salon staff on the restroom floor.  Was felt to be down for approximately 30 minutes before found.  Staff noticed that the patient had tonic clonic type activity for brief period of time.  Patient was unresponsive upon paramedics arrival.  Glucose due to be greater than 600.  Patient.  Postictal upon arrival to the emergency room.  Had another seizure-like activity with associated altered mental status while in the emergency room.  Receiving Keppra.  CT head and neck reviewed.  Discussed with ER staff.  No current family available.  Patient is unable give any meaningful history currently secondary to altered mental status.    Hospital Course:  Mrs. Guidry improved steadily but gradually.  She had no further seizure activity.  She was seen in consultation by both neurology and endocrine.      Discharge medications:  See med rec    Activity: no driving for 3 months    Diet:  Consistent carbs    Follow up:  With primary care physician in about 2 weeks  With endocrinology as per their directions.    Dispo: [facility]    Greater than 30 mins spent in discharging patient.

## 2017-12-18 NOTE — PROGRESS NOTES
Discharge Planning Assessment  [facility]     Patient Name: Halie Guidry  MRN: [id number]  Today's Date: 12/18/2017    Admit Date: 12/15/2017          Discharge Needs Assessment       12/18/17 1050    Living Environment    Lives With alone    Living Arrangements condominium    Home Accessibility no concerns    Transportation Available car;family or friend will provide    Discharge Needs Assessment    Concerns To Be Addressed discharge planning concerns    Equipment Currently Used at Home cane, straight;walker, rolling    Discharge Planning Comments Spoke with pt at bedside and her son, Josse by phone.  Facesheet info confirmed.  Pt lives alone in a condo.  She uses a cane or walker for ambulation and she states she can drive.  She has had HH in the past, and has been to [facility] for rehab.  CCP spoke with Alexia/ DM educator who states pt's A1C is elevated,so pt needs assist at home with managing her meds. Spoke with pt's so, Josse.  He states he has an appt to meet with a home care agency ([facility]), but requested more info if available.  Pvt pay caregiver list placed at bedside.  CCP discussed other DC options including HH or SNF.  Pt and Josse are in agreement with SNF if needed.  [facility]  [facility].  Referrals called.  PT eval pending.            Discharge Plan       12/18/17 1101    Case Management/Social Work Plan    Plan Undecided.  Referrals to [facility]        Discharge Placement     Facility/Agency Request Status Selected? Address Phone Number Fax Number    [facility] P[facility] - Request Sent     [address] [facility] [address] [phone]    [facility] Pending - Request Sent     [address] [address] [phone]                Demographic Summary       12/18/17 1044    Referral Information    Admission  Type inpatient    Arrived From home or self-care    Referral Source admission list    Reason For Consult discharge planning    Record Reviewed medical record    Primary Care Physician Information    Name Napoleon Mead MD            Functional Status       12/18/17 1045    Functional Status Current    Ambulation 4-->completely dependent    Transferring 4-->completely dependent    Toileting 4-->completely dependent    Bathing 4-->completely dependent    Dressing 4-->completely dependent    Eating 2-->assistive person    Communication 0-->understands/communicates without difficulty    Swallowing (if score 2 or more for any item, consult Rehab Services) 2-->difficulty swallowing liquids/foods    Change in Functional Status Since Onset of Current Illness/Injury yes    Functional Status Prior    Ambulation 1-->assistive equipment    Transferring 1-->assistive equipment    Toileting 0-->independent    Bathing 0-->independent    Dressing 0-->independent    Eating 0-->independent    Communication 0-->understands/communicates without difficulty    Swallowing 0-->swallows foods/liquids without difficulty    IADL    Medications independent    Meal Preparation independent    Housekeeping independent    Laundry independent    Shopping independent    Oral Care independent            Patient Forms       12/18/17 1100    Patient Forms    Provider Choice List Delivered    Delivered to Support person    Method of delivery In person    Important Message from Medicare (IMM) Delivered    Delivered to Support person    Method of delivery Telephone          Vannessa Argueta RN

## 2017-12-18 NOTE — PROGRESS NOTES
[facility]       Mar/chart reviewed  F/u new onset seizure.  No further sz activity  Still some confusion    Vital Sign Min/Max for last 24 hours  Temp  Min: 97.6 °F (36.4 °C)  Max: 98.8 °F (37.1 °C)   BP  Min: 135/67  Max: 168/70   Pulse  Min: 71  Max: 85   Resp  Min: 17  Max: 18   SpO2  Min: 95 %  Max: 97 %   No Data Recorded   Weight  Min: 83.8 kg (184 lb 11.9 oz)  Max: 83.8 kg (184 lb 11.9 oz)     appears ill, alert, more oreinted  perrl, eomi, no icterus,  mmm, no jvd, trachea midline, neck supple,  chest cta bilaterally, no crackles, no wheezes,   rrr,   soft, nt, nd +bs,  no c/c/ e    Labs: reviewed    A/P:  1. New onset seizure -- continue keppra, likely was due to hyperglycemia  2. Hyperglycemia -- improving -- ssi, levemir  3. Leukocytosis -- suspect reactive, awaiting u/a  4. Altered mental status -- still abnormal, but greatly improved today    D/ c tomorrow morning.

## 2017-12-18 NOTE — PROGRESS NOTES
Continued Stay Note  [facility]     Patient Name: Halie Guidry  MRN: [id number]  Today's Date: 12/18/2017    Admit Date: 12/15/2017          Discharge Plan       12/18/17 9932    Case Management/Social Work Plan    Plan Plan is for skilled bed at [facility] or [facility].      Additional Comments Spoke with MD who anticipates DC probably tomorrow.   Pt is approved at [facility] pending bed.  Call to pt's son, Josse who states he will discuss it with pt tonight and let CCP know which facility is first choice.       Vannessa Argueta RN

## 2017-12-18 NOTE — PLAN OF CARE
Problem: Patient Care Overview (Adult)  Goal: Plan of Care Review  Outcome: Ongoing (interventions implemented as appropriate)    12/18/17 1116   Coping/Psychosocial Response Interventions   Plan Of Care Reviewed With patient   Outcome Evaluation   Outcome Summary/Follow up Plan Re-eval of swallow completed. Recommend mech soft with thins. meds whole with thin or puree.         Problem: Inpatient SLP  Goal: Dysphagia- Patient will safely consume diet as per recommendation with no signs/symptoms of aspiration  Outcome: Ongoing (interventions implemented as appropriate)    12/18/17 1116   Safely Consume Diet   Safely Consume Diet- SLP, Outcome goal ongoing

## 2017-12-18 NOTE — PLAN OF CARE
Problem: Patient Care Overview (Adult)  Goal: Plan of Care Review  Outcome: Ongoing (interventions implemented as appropriate)    12/17/17 2000 12/18/17 0513   Patient Care Overview   Progress --  No change   Coping/Psychosocial Response Interventions   Plan Of Care Reviewed With patient -- pt is pleasantly forgetful about situation, such as trying to understand what the pulse oximeter is and what it is for, needing repeated information; she does not remember anything past driving to the hair salon; she does not remember going to the bathroom and having a seizure; she just remembers waking up in the ICU       Goal: Adult Individualization and Mutuality  Outcome: Ongoing (interventions implemented as appropriate)  Goal: Discharge Needs Assessment  Outcome: Ongoing (interventions implemented as appropriate)    Problem: Fall Risk (Adult)  Goal: Identify Related Risk Factors and Signs and Symptoms  Outcome: Ongoing (interventions implemented as appropriate)  Goal: Absence of Falls  Outcome: Ongoing (interventions implemented as appropriate)    Problem: Pain, Acute (Adult)  Goal: Identify Related Risk Factors and Signs and Symptoms  Outcome: Ongoing (interventions implemented as appropriate)  Goal: Acceptable Pain Control/Comfort Level  Outcome: Ongoing (interventions implemented as appropriate)    Problem: Hyperglycemia, Persistent (Adult)  Goal: Signs and Symptoms of Listed Potential Problems Will be Absent or Manageable (Hyperglycemia, Persistent)  Outcome: Ongoing (interventions implemented as appropriate)    Problem: Seizure Disorder/Epilepsy (Adult)  Goal: Signs and Symptoms of Listed Potential Problems Will be Absent or Manageable (Seizure Disorder/Epilepsy)  Outcome: Ongoing (interventions implemented as appropriate)

## 2017-12-18 NOTE — THERAPY EVALUATION
Acute Care - Physical Therapy Initial Evaluation  [facility]     Patient Name: Halie Guidry  : 1940  MRN: [id number]  Today's Date: 2017   Onset of Illness/Injury or Date of Surgery Date: 12/15/17  Date of Referral to PT: 17  Referring Physician: Logan      Admit Date: 12/15/2017     Visit Dx:    ICD-10-CM ICD-9-CM   1. Altered mental status, unspecified altered mental status type R41.82 780.97   2. Seizures R56.9 780.39   3. Hyperglycemia R73.9 790.29   4. Lactic acidosis E87.2 276.2   5. Impaired functional mobility and activity tolerance Z74.09 V49.89     Patient Active Problem List   Diagnosis   • Compression fracture   • Lumbar degenerative disc disease   • Diabetes mellitus, type 2   • Hyperlipidemia   • Benign essential hypertension   • Primary hypothyroidism   • Leukocytosis   • Neuropathy   • Osteoporosis   • Proteinuria   • Tobacco abuse   • Diabetic eye exam   • Generalized weakness   • Spinal stenosis   • Scoliosis   • Arthritis   • VBI (vertebrobasilar insufficiency)   • Orthostatic hypotension   • 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   • Severe hypothyroidism   • Noncompliance   • Vitamin D deficiency disease   • Altered mental status     Past Medical History:   Diagnosis Date   • Arthritis    • Benign essential hypertension 2014   • Bleeding disorder    • Diabetes mellitus, type 2    • Disc degeneration, lumbar    • Hyperlipidemia    • Hypothyroidism    • Neuropathy    • Osteoporosis 2015   • Rotator cuff tear, left    • Scoliosis    • Shoulder pain     LEFT, TORN ROTATOR CUFF S/P FALL   • Spinal stenosis      Past Surgical History:   Procedure Laterality Date   • APPENDECTOMY     • BILATERAL BREAST REDUCTION Bilateral 2015   • CATARACT EXTRACTION  2015   • COLONOSCOPY  2015    WNL   • KYPHOPLASTY     • REDUCTION MAMMAPLASTY     • TONSILLECTOMY            PT ASSESSMENT (last 72 hours)      PT Evaluation       17 4121  12/18/17 1100    Rehab Evaluation    Document Type evaluation  -PC evaluation  -OC    Subjective Information agree to therapy;complains of;weakness  -PC no complaints;agree to therapy  -OC    Patient Effort, Rehab Treatment good  -PC     Symptoms Noted During/After Treatment dizziness  -PC none  -OC    Symptoms Noted Comment dizzy with standing  -PC     General Information    Patient Profile Review yes  -PC     Onset of Illness/Injury or Date of Surgery Date 12/15/17  -     Referring Physician Logan  -     General Observations pt is in bed, no acute distress, alert and oriented  -     Pertinent History Of Current Problem diabetic hyperosmolar state resulting in seizure  -PC     Precautions/Limitations fall precautions  -PC     Prior Level of Function independent:;all household mobility;driving;shopping  -PC     Plans/Goals Discussed With patient  -PC     Clinical Impression    Date of Referral to PT 12/18/17  -     Patient/Family Goals Statement return to PLOF  -PC     Criteria for Skilled Therapeutic Interventions Met yes;treatment indicated  -PC     Impairments Found (describe specific impairments) gait, locomotion, and balance  -PC     Rehab Potential good, to achieve stated therapy goals  -PC     Pain Assessment    Pain Assessment No/denies pain  -PC No/denies pain  -OC    Cognitive Assessment/Intervention    Current Cognitive/Communication Assessment functional  -PC impaired  -OC    Orientation Status oriented x 4  -PC oriented to;person;place  -OC    Follows Commands/Answers Questions able to follow single-step instructions;100% of the time  -PC     Personal Safety mild impairment;decreased insight to deficits  -PC     Personal Safety Interventions fall prevention program maintained;gait belt  -PC     ROM (Range of Motion)    General ROM Detail WNL x L UE, torn rotator cuff, limited AROM, has 90% of PROM  -PC     MMT (Manual Muscle Testing)    General MMT Assessment Detail LUE shoulder lfexion 0/5,  elbow flexion 3/5, elbow extension 4/5, RUE 4/5, BLE 3+/5 at least( grossly observed)  -PC     Bed Mobility, Assessment/Treatment    Bed Mob, Supine to Sit, Door moderate assist (50% patient effort)  -PC     Bed Mob, Sit to Supine, Door minimum assist (75% patient effort)  -PC     Transfer Assessment/Treatment    Transfers, Sit-Stand Door moderate assist (50% patient effort);2 person assist required  -PC     Transfers, Stand-Sit Door moderate assist (50% patient effort);2 person assist required  -PC     Gait Assessment/Treatment    Gait, Door Level moderate assist (50% patient effort);hand held assist;2 person assist required  -PC     Gait, Distance (Feet) 2  -PC     Gait, Gait Deviations forward flexed posture;step length decreased  -PC     Gait, Safety Issues step length decreased  -PC     Gait, Impairments strength decreased  -PC     Gait, Comment stood x 2, first with walker, able to come to stand, but trunk was forward flexed over walker, ret to supine, stood with HHA x 2, to attempt to have pt stand more erect, and pt took 3 sidesteps to head of bed, reported feely dizzy with standing  -PC     Motor Skills/Interventions    Additional Documentation Balance Skills Training (Group)  -PC     Balance Skills Training    Sitting-Level of Assistance Contact guard  -PC     Sitting-Balance Support Feet supported;Left upper extremity supported;Right upper extremity supported  -PC     Standing-Level of Assistance Moderate assistance;x2  -PC     Static Standing Balance Support Left upper extremity supported;Right upper extremity supported  -PC     Gait Balance-Level of Assistance Moderate assistance;x2  -PC     Gait Balance Support Right upper extremity supported;Left upper extremity supported  -PC     Positioning and Restraints    Pre-Treatment Position in bed  -PC     Post Treatment Position bed  -PC     In Bed supine;call light within reach;encouraged to call for assist;exit alarm  on  -PC       12/18/17 1050 12/18/17 0100    General Information    Equipment Currently Used at Home cane, straight;walker, rolling  -SK     Living Environment    Lives With alone  -SK alone  -SL    Living Arrangements condominium  -SK condominium  -SL    Home Accessibility no concerns  -SK no concerns  -SL    Stair Railings at Home  none  -SL    Type of Financial/Environmental Concern  none  -SL    Transportation Available car;family or friend will provide  -SK car;family or friend will provide  -      12/16/17 0900 12/16/17 0231    Rehab Evaluation    Document Type evaluation  -LT     Subjective Information no complaints  -LT     Symptoms Noted During/After Treatment fatigue  -LT     General Information    Equipment Currently Used at Home  cane, straight  -KH    Pain Assessment    Pain Assessment No/denies pain  -LT     Cognitive Assessment/Intervention    Current Cognitive/Communication Assessment impaired  -LT     Orientation Status oriented to;person  -LT     Follows Commands/Answers Questions needs repetition;needs increased time;25% of the time  -LT       User Key  (r) = Recorded By, (t) = Taken By, (c) = Cosigned By    Initials Name Provider Type    OC Meredith Mazariegos MA,[facility]-SLP Speech and Language Pathologist    LT Ileana Barrera MS CCC-SLP Speech and Language Pathologist    PC Lenora Menjivar, PT Physical Therapist    SK Vannessa Argueta, RN Case Manager    SL Cecil Wilder, RN Registered Nurse     Elizabeth Sunshine, RN Registered Nurse          Physical Therapy Education     Title: PT OT SLP Therapies (Active)     Topic: Physical Therapy (Active)     Point: Mobility training (Active)    Learning Progress Summary    Learner Readiness Method Response Comment Documented by Status   Patient Acceptance E,D NR  PC 12/18/17 1604 Active               Point: Home exercise program (Active)    Learning Progress Summary    Learner Readiness Method Response Comment Documented by Status   Patient Acceptance E,D NR  PC  12/18/17 1604 Active               Point: Body mechanics (Active)    Learning Progress Summary    Learner Readiness Method Response Comment Documented by Status   Patient Acceptance E,D NR  PC 12/18/17 1604 Active               Point: Precautions (Active)    Learning Progress Summary    Learner Readiness Method Response Comment Documented by Status   Patient Acceptance E,D NR  PC 12/18/17 1604 Active                      User Key     Initials Effective Dates Name Provider Type Discipline     12/01/15 -  Lenora Menjivar, PT Physical Therapist PT                PT Recommendation and Plan  Anticipated Discharge Disposition: skilled nursing facility  Planned Therapy Interventions: gait training, bed mobility training, balance training, strengthening, transfer training  PT Frequency: daily  Plan of Care Review  Plan Of Care Reviewed With: patient  Outcome Summary/Follow up Plan: pt presents with weakness, impaired functional mobility, she will benefit from PT to address, pt became dizzy with standing this visit and was only able to take a few sidesteps, I expect pt to be able to return to usual functional status, but will need short term rehab to achieve this          IP PT Goals       12/18/17 1605          Bed Mobility PT LTG    Bed Mobility PT LTG, Date Established 12/18/17  -PC      Bed Mobility PT LTG, Time to Achieve 1 wk  -PC      Bed Mobility PT LTG, Activity Type supine to sit/sit to supine  -PC      Bed Mobility PT LTG, [place] Level supervision required  -PC      Transfer Training PT LTG    Transfer Training PT LTG, Date Established 12/18/17  -PC      Transfer Training PT LTG, Time to Achieve 1 wk  -PC      Transfer Training PT LTG, Activity Type sit to stand/stand to sit  -PC      Transfer Training PT LTG, [place] Level supervision required  -PC      Gait Training PT LTG    Gait Training Goal PT LTG, Date Established 12/18/17  -PC      Gait Training Goal PT LTG, Time to Achieve 1 wk  -PC      Gait  Training Goal PT LTG, [place] Level minimum assist (75% patient effort)  -PC      Gait Training Goal PT LTG, Assist Device walker, rolling  -PC      Gait Training Goal PT LTG, Distance to Achieve 25 ft  -PC        User Key  (r) = Recorded By, (t) = Taken By, (c) = Cosigned By    Initials Name Provider Type    PC Lenora Menjivar PT Physical Therapist                Outcome Measures       12/18/17 1600          How much help from another person do you currently need...    Turning from your back to your side while in flat bed without using bedrails? 3  -PC      Moving from lying on back to sitting on the side of a flat bed without bedrails? 2  -PC      Moving to and from a bed to a chair (including a wheelchair)? 2  -PC      Standing up from a chair using your arms (e.g., wheelchair, bedside chair)? 2  -PC      Climbing 3-5 steps with a railing? 1  -PC      To walk in hospital room? 2  -PC      AM-PAC 6 Clicks Score 12  -PC      Functional Assessment    Outcome Measure Options AM-PAC 6 Clicks Basic Mobility (PT)  -PC        User Key  (r) = Recorded By, (t) = Taken By, (c) = Cosigned By    Initials Name Provider Type    PC Lenora Menjivar, PT Physical Therapist           Time Calculation:         PT Charges       12/18/17 1607          Time Calculation    Start Time 1525  -PC      Stop Time 1543  -PC      Time Calculation (min) 18 min  -PC      PT Received On 12/18/17  -PC      PT - Next Appointment 12/19/17  -PC      PT Goal Re-Cert Due Date 12/25/17  -PC        User Key  (r) = Recorded By, (t) = Taken By, (c) = Cosigned By    Initials Name Provider Type    PC Lenora Menjivar PT Physical Therapist          Therapy Charges for Today     Code Description Service Date Service Provider Modifiers Qty    09725901416 HC PT EVAL MOD COMPLEXITY 2 12/18/2017 Lenora Menjivar, PT GP 1    22194885031 HC PT THER PROC EA 15 MIN 12/18/2017 Lenora Menjivar, PT GP 1    55676160683 HC PT THER SUPP EA 15 MIN 12/18/2017 Lenora Menjivar  PT GP 1          PT G-Codes  Outcome Measure Options: AM-PAC 6 Clicks Basic Mobility (PT)      Lenora Menjivar, PT  12/18/2017

## 2017-12-18 NOTE — THERAPY PROGRESS REPORT/RE-CERT
Acute Care - Speech Language Pathology   Swallow Re-Evaluation [facility]     Patient Name: Halie Guidry  : 1940  MRN: [id number]  Today's Date: 2017               Admit Date: 12/15/2017  SPEECH-LANGUAGE PATHOLOGY EVALUATION - SWALLOW  Subjective: The patient was seen on this date for a Clinical Swallow evaluation.  Patient was alert and cooperative.  Pt reports lower dentures lost and typically needs dentures for mastication.  Objective: Textures given included thin liquid, puree consistency and mechanical soft consistency.  Assessment: Difficulties were noted with none of the above consistencies. No overt s/s aspiration with thin, puree, and mech soft. Regular not trialed secondary to poor dentition and pt's report of needing dentures for mastication.   SLP Findings:  Patient presents with mild oral dysphagia, without esophageal component.   Recommendations: Diet Textures: thin liquid, mechanical soft consistency food.  Medications should be taken whole with thin or puree.   Recommended Strategies: Upright for PO and small bites and sips. Oral care before breakfast, after all meals and PRN.  Other Recommended Evaluations:     Dysphagia therapy is recommended. Rationale: Tolerate least restrictive diet.  Visit Dx:     ICD-10-CM ICD-9-CM   1. Altered mental status, unspecified altered mental status type R41.82 780.97   2. Seizures R56.9 780.39   3. Hyperglycemia R73.9 790.29   4. Lactic acidosis E87.2 276.2     Patient Active Problem List   Diagnosis   • Compression fracture   • Lumbar degenerative disc disease   • Diabetes mellitus, type 2   • Hyperlipidemia   • Benign essential hypertension   • Primary hypothyroidism   • Leukocytosis   • Neuropathy   • Osteoporosis   • Proteinuria   • Tobacco abuse   • Diabetic eye exam   • Generalized weakness   • Spinal stenosis   • Scoliosis   • Arthritis   • VBI (vertebrobasilar insufficiency)   • Orthostatic hypotension   • Autonomic neuropathy due to  secondary diabetes mellitus   • Severe hypothyroidism   • Noncompliance   • Vitamin D deficiency disease   • Altered mental status     Past Medical History:   Diagnosis Date   • Arthritis    • Benign essential hypertension 08/20/2014   • Bleeding disorder    • Diabetes mellitus, type 2    • Disc degeneration, lumbar    • Hyperlipidemia    • Hypothyroidism    • Neuropathy    • Osteoporosis 09/09/2015   • Rotator cuff tear, left    • Scoliosis    • Shoulder pain     LEFT, TORN ROTATOR CUFF S/P FALL   • Spinal stenosis      Past Surgical History:   Procedure Laterality Date   • APPENDECTOMY     • BILATERAL BREAST REDUCTION Bilateral 08/2015   • CATARACT EXTRACTION  03/2015   • COLONOSCOPY  06/05/2015    WNL   • KYPHOPLASTY     • REDUCTION MAMMAPLASTY     • TONSILLECTOMY            SWALLOW EVALUATION (last 72 hours)      Swallow Evaluation       12/18/17 1100 12/16/17 0900             Rehab Evaluation    Document Type evaluation  -OC evaluation  -LT       Subjective Information no complaints;agree to therapy  -OC no complaints  -LT       Symptoms Noted During/After Treatment none  -OC fatigue  -LT       General Information    Patient Profile Review yes  -OC yes  -LT       Current Diet Limitations thin liquids;puree  -OC        Plans/Goals Discussed With patient;agreed upon  -OC patient and family  -LT       Barriers to Rehab medically complex  -OC medically complex  -LT       Clinical Impression    Patient's Goals For Discharge patient did not state  -OC patient could not state  -LT       Family Goals For Discharge  patient able to return to PO diet  -LT       SLP Swallowing Diagnosis mild dysphagia;oral dysfunction  -OC        Rehab Potential/Prognosis, Swallowing good, to achieve stated therapy goals  -OC fair, will monitor progress closely  -LT       Criteria for Skilled Therapeutic Interventions Met skilled criteria for dysphagia intervention met  -OC skilled criteria for dysphagia intervention met  -LT       FCM,  Swallowing 4-->Level 4  -OC        Therapy Frequency PRN  -OC PRN  -LT       Predicted Duration Therapy Interv (days) until discharge  -OC until discharge  -LT       Expected Duration Therapy Session (min) 15-30 minutes  -OC        SLP Diet Recommendation soft textures;ground;thin liquids  -OC        Recommended Feeding/Eating Techniques alternate between small bites and sips of food/liquid;maintain upright posture during/after eating for 30 mins;small sips/bites  -OC        SLP Rec. for Method of Medication Administration meds whole in pudding/applesauce;meds whole with thin liquid  -OC        Monitor For Signs Of Aspiration cough;elevated WBC count;gurgly voice;throat clearing  -OC        Anticipated Discharge Disposition other (see comments)   unknown  -OC        Pain Assessment    Pain Assessment No/denies pain  -OC No/denies pain  -LT       Cognitive Assessment/Intervention    Current Cognitive/Communication Assessment impaired  -OC impaired  -LT       Orientation Status oriented to;person;place  -OC oriented to;person  -LT       Follows Commands/Answers Questions  needs repetition;needs increased time;25% of the time  -LT       Oral Motor Structure and Function    Oral Motor Anatomy and Physiology patient demonstrates anatomy and physiology that is WNL  -OC patient demonstrates anatomy that is WNL  -LT       Dentition Assessment other (see comments)   Lower dentures missing  -OC missing teeth;upper dentures/partial in place   lower mostly edentulous  -LT       Oral Musculature General Assessment  --   generalized weakness  -LT         User Key  (r) = Recorded By, (t) = Taken By, (c) = Cosigned By    Initials Name Effective Dates    OC Meredith Mazariegos MA,CCC-SLP 04/05/17 -     LT Ileana Barrera MS CCC-SLP 04/13/15 -         EDUCATION  The patient has been educated in the following areas:   Dysphagia (Swallowing Impairment) Modified Diet Instruction.    SLP Recommendation and Plan  SLP Swallowing Diagnosis: mild  dysphagia, oral dysfunction  SLP Diet Recommendation: soft textures, ground, thin liquids  Recommended Feeding/Eating Techniques: alternate between small bites and sips of food/liquid, maintain upright posture during/after eating for 30 mins, small sips/bites  SLP Rec. for Method of Medication Administration: meds whole in pudding/applesauce, meds whole with thin liquid  Monitor For Signs Of Aspiration: cough, elevated WBC count, gurgly voice, throat clearing     Criteria for Skilled Therapeutic Interventions Met: skilled criteria for dysphagia intervention met  Anticipated Discharge Disposition: other (see comments) (unknown)  Rehab Potential/Prognosis, Swallowing: good, to achieve stated therapy goals  Therapy Frequency: PRN             Plan of Care Review  Plan Of Care Reviewed With: patient  Outcome Summary/Follow up Plan: Re-eval of swallow completed. Recommend mech soft with thins. meds whole with thin or puree.          IP SLP Goals       12/18/17 1116 12/16/17 0958       Safely Consume Diet    Safely Consume Diet- SLP, Time to Achieve  by discharge  -LT     Safely Consume Diet- SLP, Additional Goal  puree/thin  -LT     Safely Consume Diet- SLP, Outcome goal ongoing  -OC goal ongoing  -LT       User Key  (r) = Recorded By, (t) = Taken By, (c) = Cosigned By    Initials Name Provider Type    OC Meredith Mazariegos MA,CCC-SLP Speech and Language Pathologist    LT Ileana Barrera MS CCC-SLP Speech and Language Pathologist             SLP Outcome Measures (last 72 hours)      SLP Outcome Measures       12/18/17 1100 12/16/17 0900       SLP Outcome Measures    Outcome Measure Used? Adult NOMS  -OC Adult NOMS  -LT     FCM Scores    FCM Chosen Swallowing  -OC Swallowing  -LT     Swallowing FCM Score 4  -OC 4  -LT       User Key  (r) = Recorded By, (t) = Taken By, (c) = Cosigned By    Initials Name Effective Dates    NINO Mazariegos MA,CCC-SLP 04/05/17 -     LT Ileana Barrera MS CCC-SLP 04/13/15 -            Time Calculation:          Time Calculation- SLP       12/18/17 1118          Time Calculation- SLP    SLP Start Time 1045  -OC      SLP Stop Time 1130  -OC      SLP Time Calculation (min) 45 min  -OC      SLP Received On 12/18/17  -OC        User Key  (r) = Recorded By, (t) = Taken By, (c) = Cosigned By    Initials Name Provider Type    OC Meredith Mazariegos MA,CCC-SLP Speech and Language Pathologist          Therapy Charges for Today     Code Description Service Date Service Provider Modifiers Qty    88737637300  ST TREATMENT SWALLOW 3 12/18/2017 Meredith Mazariegos MA,CCC-SLP GN 1               Meredith Mazariegos MA,THONG-SLP  12/18/2017

## 2017-12-18 NOTE — CONSULTS
"Diabetes Education  Assessment/Teaching    Patient Name:  Halie Guidry  YOB: 1940  MRN: [id number]  Admit Date:  12/15/2017      Assessment Date:  12/18/2017       Most Recent Value    General Information      Referral From:  MD order [A1C 11.5%]    Height  172.7 cm (68\")    Height Method  Stated    Weight  83.8 kg (184 lb 11.9 oz)    Weight Method  Bed scale    Pregnancy Assessment     Diabetes History     What type of diabetes do you have?  Type 2    Length of Diabetes Diagnosis  10 + years    Current DM knowledge  poor    Do you test your blood sugar at home?  yes    Have you had low blood sugar? (<70mg/dl)  yes    How often do you have low blood sugar?  rare    Have you had high blood sugar? (>140mg/dl)  yes    How often do you have high blood sugar?  frequently    How would you rate your diabetes control?  poor    What makes it difficult for you to take care of your diabetes or yourself?  -- [pt states that getting her meds is an issue,states she would take  her meds if they were there for her to take]    Education Preferences     What areas of diabetes would you like to learn about?  avoiding high blood sugar, medications for diabetes, stress/coping skills    Barriers to Learning  other (comment) [memory issues]    Nutrition Information     Assessment Topics     Healthy Eating - Assessment  N/A-unable to assess [pt states she has been drinking juice alot]    Being Active - Assessment  N/A- unable to assess    Taking Medication - Assessment  Needs education    Problem Solving - Assessment  Needs education    Reducing Risk - Assessment  Needs education    Healthy Coping - Assessment  Needs education    Monitoring - Assessment  N/A-unable to assess [states she tests couple times day]    DM Goals                Most Recent Value    DM Education Needs     Meter  Has own    Meter Type  Other (comment)    Frequency of Testing  2 times a day [pt states she tests couple times day]    Medication  " Insulin [reviewed patient insulin regime,states she takes her meds 90% time if they are there for her,but was unable to be specific on what and how much she took]    Problem Solving  Hyperglycemia, Signs, Symptoms    Healthy Coping  Frustrated, Appropriate [was able to converse but got frustrated with lack of memory on some topics]    Motivation  Moderate    Teaching Method  Explanation, Discussion, Teach back    Patient Response  Needs reinforcement, Other (comment) [pts family needs to be in the mix of helping with diabetes management]            Other Comments:  Discussed patients diabetes management with her,states she lives alone,states she tests 2-3 times day .Also, she states she takes her insulin 90% time,however cannot recall the name of it or how much she takes. States her blood sugars are elevated because she was drinking juice. We definitely need to have discussion with sons about her diabetes management ,as it will need to be monitored more closely. Spoke with RN at bedside and to case management.  We will return when it is determined where and when pt will be discharged,to help in educating pt or sons if needed.        Electronically signed by:  Alexia Prajapati RN  12/18/17 9:55 AM

## 2017-12-18 NOTE — PROGRESS NOTES
"Progress Note      PATIENT Halie Guidry    1940   MRN [id number]   ADMIT DATE 12/15/2017   LENGTH OF STAY 3 days   ATTENDING Dung Isbell MD       CHIEF COMPLAINT: Loss of Consciousness      DIAGNOSIS: Lactic acidosis [E87.2]  Seizures [R56.9]  Hyperglycemia [R73.9]  Altered mental status, unspecified altered mental status type [R41.82]    HISTORY OF PRESENT ILLNESS: No complaints. She asked if she  and came back.  I said no and explained situation.  Nurses noted no problems    PHYSICAL EXAMINATION:  GENERAL: Reveals a man/woman who appears his/her stated age, in no acute distress.  VITAL SIGNS: /67 (BP Location: Right arm, Patient Position: Lying)  Pulse 81  Temp 98.6 °F (37 °C) (Oral)   Resp 18  Ht 172.7 cm (68\")  Wt 83.8 kg (184 lb 11.9 oz)  SpO2 95%  BMI 28.09 kg/m2  NECK: Carotids are equal without bruits.   HEART: Regular rate and rhythm with no significant murmur or gallop.   EXTREMITIES: Nonedematous. Pulses are intact. There is no rash. There is no hepatosplenomegaly.   NEUROLOGIC: He is awake and alert. He is oriented x3 except could not come up with 2017.. There is no aphasia. Visual fields are full. Cranial nerves are intact. Power is normal in all 4 extremities except weak ankle dorsiflexors bilaterally along with Pes Cavus.  Unaware of FH of such. Toes are downgoing.      DIAGNOSTIC DATA: Labs are acceptable.  MRI brain normal    IMPRESSION AND PLAN: She looks good today. I think this was just a hyperosmolar state with slow resolution which is not uncommoon.  No evidence of primary neurologic process.      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    Will see PRN.          Ziggy Ford MD  2017  9:16 AM  "

## 2017-12-18 NOTE — PLAN OF CARE
Problem: Patient Care Overview (Adult)  Goal: Plan of Care Review  Outcome: Ongoing (interventions implemented as appropriate)    12/18/17 1605   Coping/Psychosocial Response Interventions   Plan Of Care Reviewed With patient   Outcome Evaluation   Outcome Summary/Follow up Plan pt presents with weakness, impaired functional mobility, she will benefit from PT to address, pt became dizzy with standing this visit and was only able to take a few sidesteps, I expect pt to be able to return to usual functional status, but will need short term rehab to achieve this         Problem: Inpatient Physical Therapy  Goal: Bed Mobility Goal LTG- PT  Outcome: Ongoing (interventions implemented as appropriate)    12/18/17 1605   Bed Mobility PT LTG   Bed Mobility PT LTG, Date Established 12/18/17   Bed Mobility PT LTG, Time to Achieve 1 wk   Bed Mobility PT LTG, Activity Type supine to sit/sit to supine   Bed Mobility PT LTG, Radford Level supervision required       Goal: Transfer Training Goal 1 LTG- PT  Outcome: Ongoing (interventions implemented as appropriate)    12/18/17 1605   Transfer Training PT LTG   Transfer Training PT LTG, Date Established 12/18/17   Transfer Training PT LTG, Time to Achieve 1 wk   Transfer Training PT LTG, Activity Type sit to stand/stand to sit   Transfer Training PT LTG, Rad[place] Level supervision required       Goal: Gait Training Goal LTG- PT  Outcome: Ongoing (interventions implemented as appropriate)    12/18/17 1605   Gait Training PT LTG   Gait Training Goal PT LTG, Date Established 12/18/17   Gait Training Goal PT LTG, Time to Achieve 1 wk   Gait Training Goal PT LTG, Rad[place] Level minimum assist (75% patient effort)   Gait Training Goal PT LTG, Assist Device walker, rolling   Gait Training Goal PT LTG, Distance to Achieve 25 ft

## 2017-12-18 NOTE — DISCHARGE PLACEMENT REQUEST
"Tabatha Guidry (77 y.o. Female)     Date of Birth Social Security Number Address Home Phone MRN    1940  [id number] [facility] [address] [place] [address] [phone] [id number]    [place] Marital Status          Unknown        Admission Date Admission Type Admitting Provider Attending Provider Department, Room/Bed    12/15/17 Emergency Dung Isbell MD Esterle, Mark Edwin, MD [address], [address]/1    Discharge Date Discharge Disposition Discharge Destination                      Attending Provider: Dung Isbell MD     Allergies:  Sulfa Antibiotics    Isolation:  None   Infection:  None   Code Status:  FULL    Ht:  172.7 cm (68\")   Wt:  83.8 kg (184 lb 11.9 oz)    Admission Cmt:  None   Principal Problem:  None                Active Insurance as of 12/15/2017     Primary Coverage     Payor Plan Insurance Group Employer/Plan Group    [facility] MEDICARE REPLACEMENT [facility] [id number]     Payor Plan Address Payor Plan Phone Number Effective From Effective To    [address] [address]  1/1/2016     [place] [address]       Subscriber Name Subscriber Birth Date Member ID       TABATHA GUIDRY 1940 [id number]                 Emergency Contacts      (Rel.) Home Phone Work Phone Mobile Phone    Derrick Guidry (Son) [phone] -- --    No name specified -- -- --    Josse Guidry (Son) -- -- [phone]    Melida Guidry [phone] -- --              "

## 2017-12-19 VITALS
DIASTOLIC BLOOD PRESSURE: 81 MMHG | HEART RATE: 76 BPM | BODY MASS INDEX: 28.67 KG/M2 | HEIGHT: 68 IN | WEIGHT: 189.15 LBS | SYSTOLIC BLOOD PRESSURE: 168 MMHG | RESPIRATION RATE: 18 BRPM | OXYGEN SATURATION: 94 % | TEMPERATURE: 98.4 F

## 2017-12-19 LAB
C PEPTIDE SERPL-MCNC: 1.3 NG/ML (ref 1.1–4.4)
GLUCOSE BLDC GLUCOMTR-MCNC: 172 MG/DL (ref 70–130)
GLUCOSE BLDC GLUCOMTR-MCNC: 224 MG/DL (ref 70–130)
GLUCOSE BLDC GLUCOMTR-MCNC: 89 MG/DL (ref 70–130)

## 2017-12-19 PROCEDURE — 63710000001 INSULIN ASPART PER 5 UNITS: Performed by: INTERNAL MEDICINE

## 2017-12-19 PROCEDURE — 99233 SBSQ HOSP IP/OBS HIGH 50: CPT | Performed by: INTERNAL MEDICINE

## 2017-12-19 PROCEDURE — 25010000002 LEVETIRACETAM IN NACL 0.82% 500 MG/100ML SOLUTION: Performed by: INTERNAL MEDICINE

## 2017-12-19 PROCEDURE — 82962 GLUCOSE BLOOD TEST: CPT

## 2017-12-19 PROCEDURE — 97110 THERAPEUTIC EXERCISES: CPT

## 2017-12-19 RX ORDER — ATORVASTATIN CALCIUM 40 MG/1
40 TABLET, FILM COATED ORAL NIGHTLY
Start: 2017-12-19 | End: 2019-05-16 | Stop reason: SDUPTHER

## 2017-12-19 RX ORDER — LEVOTHYROXINE SODIUM 0.15 MG/1
150 TABLET ORAL DAILY
Start: 2017-12-19 | End: 2018-05-24 | Stop reason: SDUPTHER

## 2017-12-19 RX ADMIN — INSULIN ASPART 6 UNITS: 100 INJECTION, SOLUTION INTRAVENOUS; SUBCUTANEOUS at 17:44

## 2017-12-19 RX ADMIN — LEVOTHYROXINE SODIUM 150 MCG: 150 TABLET ORAL at 05:03

## 2017-12-19 RX ADMIN — INSULIN ASPART 9 UNITS: 100 INJECTION, SOLUTION INTRAVENOUS; SUBCUTANEOUS at 17:45

## 2017-12-19 RX ADMIN — LEVETIRACETAM 500 MG: 5 INJECTION INTRAVENOUS at 09:02

## 2017-12-19 RX ADMIN — METOPROLOL TARTRATE 5 MG: 5 INJECTION, SOLUTION INTRAVENOUS at 09:01

## 2017-12-19 RX ADMIN — METOPROLOL TARTRATE 5 MG: 5 INJECTION, SOLUTION INTRAVENOUS at 04:31

## 2017-12-19 RX ADMIN — INSULIN ASPART 4 UNITS: 100 INJECTION, SOLUTION INTRAVENOUS; SUBCUTANEOUS at 09:00

## 2017-12-19 RX ADMIN — INSULIN ASPART 7 UNITS: 100 INJECTION, SOLUTION INTRAVENOUS; SUBCUTANEOUS at 09:00

## 2017-12-19 RX ADMIN — METOPROLOL TARTRATE 5 MG: 5 INJECTION, SOLUTION INTRAVENOUS at 17:44

## 2017-12-19 NOTE — PROGRESS NOTES
77 y.o.   LOS: 4 days   Patient Care Team:  Napoleon Mead MD as PCP - General  Napoleon Mead MD as PCP - Family Medicine    Chief Complaint:  Hyperglycemia    Chief Complaint   Patient presents with   • Loss of Consciousness       Subjective  patient's blood sugars are well controlled on the current insulin regimen.  She is eating well.  But she still seems to be confused.  She has her friend at her bedside with whom I have discussed about her benefit of going to a rehabilitation and eventually to a nursing facility or to an assisted living.      Interval History:    Review of Systems:   Review of Systems   Constitutional: Positive for appetite change and fatigue.   Gastrointestinal: Negative for diarrhea and vomiting.   Endocrine: Negative for polydipsia and polyuria.   Musculoskeletal: Negative for myalgias.   Neurological: Positive for weakness and numbness.     Objective     Vital Signs   Temp:  [97.5 °F (36.4 °C)-98.3 °F (36.8 °C)] 98.3 °F (36.8 °C)  Heart Rate:  [68-75] 74  Resp:  [18] 18  BP: (127-166)/(68-88) 166/87    Physical Exam:  Alert and oriented ×2, mildly confused  No acanthosis nigricans, thyroid palpable  Clear to auscultation  S1, S2 heard, systolic murmur heard  No lipodystrophy  Hammer toes noted    Physical Exam   Results Review:     I reviewed the patient's new clinical results.      No results found for: GLUCOSE  Lab Results (last 72 hours)     Procedure Component Value Units Date/Time    Blood Gas, Arterial [[id number]]  (Abnormal) Collected:  12/15/17 1805    Specimen:  Arterial Blood Updated:  12/15/17 1811     Site Arterial: right brachial     Bebeto's Test Positive     pH, Arterial 7.345 (L) pH units      pCO2, Arterial 37.4 mm Hg      pO2, Arterial 178.4 (H) mm Hg      HCO3, Arterial 20.4 (L) mmol/L      Base Excess, Arterial -4.7 (L) mmol/L      O2 Saturation Calculated 99.5 (H) %      Barometric Pressure for Blood Gas 751.1 mmHg      Modality NRB     Flow Rate 15 lpm      Ventilator  Mode n/a     Rate 15 Breaths/minute     Narrative:       sat 100% Meter: [id number] : [id number] Usama Pimentel    CBC & Differential [[id number]] Collected:  12/15/17 1811    Specimen:  Blood Updated:  12/15/17 1831    Narrative:       The following orders were created for panel order CBC & Differential.  Procedure                               Abnormality         Status                     ---------                               -----------         ------                     CBC Auto Differential[[id number]]        Abnormal            Final result                 Please view results for these tests on the individual orders.    CBC Auto Differential [[id number]]  (Abnormal) Collected:  12/15/17 1811    Specimen:  Blood Updated:  12/15/17 1831     WBC 11.60 (H) 10*3/mm3      RBC 4.95 10*6/mm3      Hemoglobin 14.3 g/dL      Hematocrit 44.0 %      MCV 88.9 fL      MCH 28.9 pg      MCHC 32.5 g/dL      RDW 15.5 (H) %      RDW-SD 50.3 fl      MPV 11.1 fL      Platelets 453 10*3/mm3      Neutrophil % 77.3 (H) %      Lymphocyte % 16.3 (L) %      Monocyte % 4.5 (L) %      Eosinophil % 0.7 %      Basophil % 0.8 %      Immature Grans % 0.4 %      Neutrophils, Absolute 8.97 (H) 10*3/mm3      Lymphocytes, Absolute 1.89 10*3/mm3      Monocytes, Absolute 0.52 10*3/mm3      Eosinophils, Absolute 0.08 10*3/mm3      Basophils, Absolute 0.09 10*3/mm3      Immature Grans, Absolute 0.05 (H) 10*3/mm3     POC Glucose Once [[id number]]  (Abnormal) Collected:  12/15/17 1850    Specimen:  Blood Updated:  12/15/17 1852     Glucose >599 (C) mg/dL     Narrative:       Verify with Lab Meter: [id number] : [id number] [place] Cab[place]    Protime-INR [[id number]]  (Abnormal) Collected:  12/15/17 1811    Specimen:  Blood Updated:  12/15/17 1854     Protime 14.2 Seconds      INR 1.14 (H)    Lactic Acid, Plasma [[id number]]  (Abnormal) Collected:  12/15/17 1811    Specimen:  Blood Updated:  12/15/17 1857     Lactate 10.4 (C) mmol/L      Comprehensive Metabolic Panel [[id number]]  (Abnormal) Collected:  12/15/17 1811    Specimen:  Blood Updated:  12/15/17 1858     Glucose 768 (C) mg/dL      BUN 28 (H) mg/dL      Creatinine 1.26 (H) mg/dL      Sodium 129 (L) mmol/L      Potassium 5.3 (H) mmol/L      Chloride 84 (L) mmol/L      CO2 19.1 (L) mmol/L      Calcium 10.8 (H) mg/dL      Total Protein 8.8 (H) g/dL      Albumin 4.40 g/dL      ALT (SGPT) 21 U/L      AST (SGOT) 16 U/L      Alkaline Phosphatase 99 U/L      Total Bilirubin 0.6 mg/dL      eGFR Non African Amer 41 (L) mL/min/1.73      Globulin 4.4 gm/dL      A/G Ratio 1.0 g/dL      BUN/Creatinine Ratio 22.2     Anion Gap 25.9 mmol/L     Narrative:       The MDRD GFR formula is only valid for adults with stable renal function between ages 18 and 70.    Ketone Bodies, Serum (Not performed at [place]) [[id number]] Collected:  12/15/17 1816    Specimen:  Blood Updated:  12/15/17 1908    Narrative:       The following orders were created for panel order Ketone Bodies, Serum (Not performed at [place]).  Procedure                               Abnormality         Status                     ---------                               -----------         ------                     Beta Hydroxybutyrate Campbell...[[id number]]  Abnormal            Final result                 Please view results for these tests on the individual orders.    Beta Hydroxybutyrate Quantitative [[id number]]  (Abnormal) Collected:  12/15/17 1816    Specimen:  Blood Updated:  12/15/17 1908     Beta-Hydroxybutyrate Quant 0.368 (H) mmol/L     [place] Draw [[id number]] Collected:  12/15/17 1811    Specimen:  Blood Updated:  12/15/17 1916    Narrative:       The following orders were created for panel order [place] Draw.  Procedure                               Abnormality         Status                     ---------                               -----------         ------                     Light Blue Top[[id number]]                                    Final result               Green Top (Gel)[[id number]]                                  Final result               Lavender Top[[id number]]                                     Final result               Gold Top - SST[[id number]]                                   Final result                 Please view results for these tests on the individual orders.    Light Blue Top [[id number]] Collected:  12/15/17 1811    Specimen:  Blood Updated:  12/15/17 1916     Extra Tube hold for add-on      Auto resulted       Green Top (Gel) [[id number]] Collected:  12/15/17 1811    Specimen:  Blood Updated:  12/15/17 1916     Extra Tube Hold for add-ons.      Auto resulted.       Lavender Top [[id number]] Collected:  12/15/17 1811    Specimen:  Blood Updated:  12/15/17 1916     Extra Tube hold for add-on      Auto resulted       Gold Top - SST [[id number]] Collected:  12/15/17 1811    Specimen:  Blood Updated:  12/15/17 1916     Extra Tube Hold for add-ons.      Auto resulted.       POC Glucose Once [[id number]]  (Abnormal) Collected:  12/15/17 2010    Specimen:  Blood Updated:  12/15/17 2016     Glucose >599 (C) mg/dL     Narrative:       Verify with Lab Meter: [id number] : [id number] Allie Fong    Hemoglobin A1c [[id number]]  (Abnormal) Collected:  12/15/17 1811    Specimen:  Blood Updated:  12/15/17 2024     Hemoglobin A1C 11.52 (H) %     Narrative:       Hemoglobin A1C Ranges:    Increased Risk for Diabetes  5.7% to 6.4%  Diabetes                     >= 6.5%  Diabetic Goal                < 7.0%    Glucose, Random [[id number]]  (Abnormal) Collected:  12/15/17 2018    Specimen:  Blood Updated:  12/15/17 2059     Glucose 671 (C) mg/dL     Lactic Acid, Plasma [[id number]]  (Abnormal) Collected:  12/15/17 2117    Specimen:  Blood Updated:  12/15/17 2208     Lactate 3.4 (C) mmol/L     Lactic Acid, Reflex Timer [[id number]] Collected:  12/15/17 1811    Specimen:  Blood Updated:  12/15/17 2211     Extra Tube Hold for add-ons.      Auto  resulted.       POC Glucose Once [[id number]]  (Abnormal) Collected:  12/15/17 2308    Specimen:  Blood Updated:  12/15/17 2311     Glucose 527 (C) mg/dL     Narrative:       RN Notified R and V Meter: [id number] : [id number] Yudijacquelyn BARRERA    Lactic Acid, Reflex [[id number]]  (Abnormal) Collected:  12/15/17 2331    Specimen:  Blood Updated:  12/16/17 0006     Lactate 2.7 (C) mmol/L     Basic Metabolic Panel [[id number]]  (Abnormal) Collected:  12/15/17 2331    Specimen:  Blood Updated:  12/16/17 0014     Glucose 501 (C) mg/dL      BUN 23 mg/dL      Creatinine 0.98 mg/dL      Sodium 137 mmol/L      Potassium 4.0 mmol/L      Chloride 96 (L) mmol/L      CO2 22.7 mmol/L      Calcium 9.0 mg/dL      eGFR Non African Amer 55 (L) mL/min/1.73      BUN/Creatinine Ratio 23.5     Anion Gap 18.3 mmol/L     Narrative:       The MDRD GFR formula is only valid for adults with stable renal function between ages 18 and 70.    POC Glucose Once [[id number]]  (Abnormal) Collected:  12/16/17 0332    Specimen:  Blood Updated:  12/16/17 0334     Glucose 302 (H) mg/dL     Narrative:       Meter: [id number] : [id number] Yudi Unique HOLLY    Basic Metabolic Panel [[id number]]  (Abnormal) Collected:  12/16/17 0446    Specimen:  Blood Updated:  12/16/17 0556     Glucose 219 (H) mg/dL      BUN 19 mg/dL      Creatinine 0.84 mg/dL      Sodium 141 mmol/L      Potassium 3.7 mmol/L      Chloride 102 mmol/L      CO2 23.0 mmol/L      Calcium 9.2 mg/dL      eGFR Non African Amer 66 mL/min/1.73      BUN/Creatinine Ratio 22.6     Anion Gap 16.0 mmol/L     Narrative:       The MDRD GFR formula is only valid for adults with stable renal function between ages 18 and 70.    CBC (No Diff) [[id number]]  (Abnormal) Collected:  12/16/17 0446    Specimen:  Blood Updated:  12/16/17 0610     WBC 19.84 (H) 10*3/mm3      RBC 4.60 10*6/mm3      Hemoglobin 13.0 g/dL      Hematocrit 41.5 %      MCV 90.2 fL      MCH 28.3 pg      MCHC 31.3 (L) g/dL      RDW  15.1 (H) %      RDW-SD 49.1 fl      MPV 10.6 fL      Platelets 436 10*3/mm3     POC Glucose Once [[id number]]  (Abnormal) Collected:  12/16/17 0744    Specimen:  Blood Updated:  12/16/17 0756     Glucose 173 (H) mg/dL     Narrative:       Meter: [id number] : [id number] Roosevelt Hargrove    POC Glucose Once [[id number]]  (Normal) Collected:  12/16/17 1211    Specimen:  Blood Updated:  12/16/17 1212     Glucose 128 mg/dL     Narrative:       Meter: [id number] : [id number] Maxime Lanie    POC Glucose Once [[id number]]  (Abnormal) Collected:  12/16/17 1509    Specimen:  Blood Updated:  12/16/17 1510     Glucose 151 (H) mg/dL     Narrative:       Meter: [id number] : [id number] Roosevelt Hargrove    POC Glucose Once [[id number]]  (Normal) Collected:  12/16/17 2010    Specimen:  Blood Updated:  12/16/17 2011     Glucose 106 mg/dL     Narrative:       Meter: [id number] : [id number] Boo HERNANDEZ    POC Glucose Once [[id number]]  (Abnormal) Collected:  12/17/17 0007    Specimen:  Blood Updated:  12/17/17 0008     Glucose 175 (H) mg/dL     Narrative:       Meter: [id number] : [id number] Allie Fong    POC Glucose Once [[id number]]  (Abnormal) Collected:  12/17/17 0406    Specimen:  Blood Updated:  12/17/17 0407     Glucose 282 (H) mg/dL     Narrative:       Meter: [id number] : [id number] Gary Lima    POC Glucose Once [[id number]]  (Abnormal) Collected:  12/17/17 0740    Specimen:  Blood Updated:  12/17/17 0742     Glucose 286 (H) mg/dL     Narrative:       Meter: [id number] : [id number] Negrito Lara    POC Glucose Once [[id number]]  (Abnormal) Collected:  12/17/17 1054    Specimen:  Blood Updated:  12/17/17 1056     Glucose 251 (H) mg/dL     Narrative:       Meter: [id number] : [id number] Negrito Lara    T4, Free [[id number]]  (Normal) Collected:  12/16/17 0446    Specimen:  Blood Updated:  12/17/17 1343     Free T4 1.12 ng/dL     TSH [[id number]]  (Abnormal) Collected:  12/16/17 0446    Specimen:   Blood Updated:  12/17/17 1343     TSH 8.160 (H) mIU/mL     Urinalysis With / Culture If Indicated - Urine, Clean Catch [[id number]]  (Abnormal) Collected:  12/17/17 1431    Specimen:  Urine from Urine, Catheter Updated:  12/17/17 1503     Color, UA Yellow     Appearance, UA Clear     pH, UA 6.0     Specific Gravity, UA 1.023     Glucose, UA >=1000 mg/dL (3+) (A)     Ketones, UA Trace (A)     Bilirubin, UA Negative     Blood, UA Negative     Protein, UA >=300 mg/dL (3+) (A)     Leuk Esterase, UA Negative     Nitrite, UA Negative     Urobilinogen, UA 1.0 E.U./dL    Urinalysis, Microscopic Only - Urine, Clean Catch [[id number]] Collected:  12/17/17 1431    Specimen:  Urine from Urine, Catheter Updated:  12/17/17 1503     RBC, UA 0-2 /HPF      WBC, UA 0-2 /HPF      Bacteria, UA None Seen /HPF      Squamous Epithelial Cells, UA 0-2 /HPF      Hyaline Casts, UA 0-2 /LPF      Methodology Automated Microscopy    POC Glucose Once [[id number]]  (Abnormal) Collected:  12/17/17 1516    Specimen:  Blood Updated:  12/17/17 1518     Glucose 162 (H) mg/dL     Narrative:       Meter: [id number] : [id number] Negrito Lara    POC Glucose Once [[id number]]  (Abnormal) Collected:  12/17/17 1947    Specimen:  Blood Updated:  12/17/17 1948     Glucose 208 (H) mg/dL     Narrative:       Meter: [id number] : [id number] Yudi Calhoun HOLLY    Lipid Panel [[id number]]  (Abnormal) Collected:  12/18/17 0417    Specimen:  Blood Updated:  12/18/17 0514     Total Cholesterol 238 (H) mg/dL      Triglycerides 265 (H) mg/dL      HDL Cholesterol 36 (L) mg/dL      LDL Cholesterol  149 (H) mg/dL      VLDL Cholesterol 53 (H) mg/dL      LDL/HDL Ratio 4.14    Narrative:       Cholesterol Reference Ranges  (U.S. Department of Health and Human Services ATP III Classifications)    Desirable          <200 mg/dL  Borderline High    200-239 mg/dL  High Risk          >240 mg/dL      Triglyceride Reference Ranges  (U.S. Department of Health and Human Services  ATP III Classifications)    Normal           <150 mg/dL  Borderline High  150-199 mg/dL  High             200-499 mg/dL  Very High        >500 mg/dL    HDL Reference Ranges  (U.S. Department of Health and Human Services ATP III Classifcations)    Low     <40 mg/dl (major risk factor for CHD)  High    >60 mg/dl ('negative' risk factor for CHD)        LDL Reference Ranges  (U.S. Department of Health and Human Services ATP III Classifcations)    Optimal          <100 mg/dL  Near Optimal     100-129 mg/dL  Borderline High  130-159 mg/dL  High             160-189 mg/dL  Very High        >189 mg/dL    Vitamin B12 [[id number]]  (Normal) Collected:  12/18/17 0417    Specimen:  Blood Updated:  12/18/17 0528     Vitamin B-12 715 pg/mL     Vitamin D 25 Hydroxy [[id number]]  (Normal) Collected:  12/18/17 0417    Specimen:  Blood Updated:  12/18/17 0530     25 Hydroxy, Vitamin D 33.2 ng/ml     Narrative:       Reference Range for Total Vitamin D 25(OH)     Deficiency    <20.0 ng/mL   Insufficiency 21-29 ng/mL   Sufficiency    ng/mL  Toxicity      >100 ng/ml         POC Glucose Once [[id number]]  (Normal) Collected:  12/18/17 0656    Specimen:  Blood Updated:  12/18/17 0723     Glucose 114 mg/dL     Narrative:       Meter: [id number] : [id number] Laurent HERNANDEZ    POC Glucose Once [[id number]]  (Normal) Collected:  12/18/17 0802    Specimen:  Blood Updated:  12/18/17 0803     Glucose 119 mg/dL     Narrative:       Meter: [id number] : [id number] Sheela Peace    C-Peptide [[id number]] Collected:  12/18/17 0917    Specimen:  Blood Updated:  12/18/17 0928    Glutamic Acid Decarboxylase [[id number]] Collected:  12/18/17 0917    Specimen:  Blood Updated:  12/18/17 0928    Intrinsic Factor Ab [[id number]] Collected:  12/18/17 0951    Specimen:  Blood Updated:  12/18/17 1010    POC Glucose Once [[id number]]  (Abnormal) Collected:  12/18/17 1055    Specimen:  Blood Updated:  12/18/17 1056     Glucose 186 (H) mg/dL      Narrative:       Meter: [id number] : [id number] Sheela Peace        Imaging Results (last 72 hours)     Procedure Component Value Units Date/Time    CT Head Without Contrast [[id number]] Collected:  12/15/17 1845     Updated:  12/15/17 1849    Narrative:       EMERGENCY NONCONTRAST HEAD CT SCAN.     HISTORY: Patient is a 77-year-old female who was found unconscious with  possible seizure presenting with unresponsiveness and confusion.     COMPARISON: 05/27/2017.     Radiation dose reduction techniques were utilized, including automated  exposure control and exposure modulation based on body size.     FINDINGS:   1. The basal cisterns and sulci over the convexities are normal for age.     2. The ventricles are normal without displacement.   3. There is periventricular lucency consistent with chronic ischemic  change.    4. There is no significant interval change nor other evidence of an  acute intracranial process.       Impression:       1. No evidence of acute intracranial process.     This report was finalized on 12/15/2017 6:46 PM by Dr. Andrew Long MD.       CT Cervical Spine Without Contrast [[id number]] Collected:  12/15/17 1849     Updated:  12/15/17 1901    Narrative:       EMERGENCY NONCONTRAST CT CERVICAL SPINE     HISTORY: Patient is a 77-year-old female was found unconscious possibly  due to a seizure presenting with confusion and unresponsiveness.     PROTOCOL: Imaging was performed with standard technique.     Radiation dose reduction techniques were utilized including automated  exposure control and exposure modulation based on body size.     COMPARISON: MRI of the cervical spine dated 05/20/2017     FINDINGS:  1. Diffuse osteopenia spondylosis and multiple chronic compression  deformities.  2. No evidence of acute compression, traumatic malalignment nor focal  bone lesion.  3. Degenerative disease and vacuum phenomenon most pronounced at C3-C4  and C5-6-7.  4. No acute abnormality, prevertebral  soft tissues appear normal.       Impression:       1. No acute abnormality.     This report was finalized on 12/15/2017 6:58 PM by Dr. Andrew Long MD.       XR Chest 1 View [[id number]] Collected:  12/15/17 2040     Updated:  12/15/17 2058    Narrative:       ONE VIEW PORTABLE CHEST     HISTORY: Shortness of breath.     FINDINGS: The lungs are well-expanded and clear except for a calcified  granuloma in the right midlung. The heart and hilar structures appear  normal and there is no acute disease or change from 05/27/2017.     This report was finalized on 12/15/2017 8:55 PM by Dr. Elder Biggs MD.       MRI Brain Without Contrast [[id number]] Collected:  12/17/17 1706     Updated:  12/17/17 1706    Narrative:       MRI OF THE BRAIN WITHOUT CONTRAST 12/17/2017.     HISTORY: Confusion. Possible seizure.     TECHNIQUE: Multiple axial, sagittal and coronal noncontrast images were  obtained through the brain.     FINDINGS: There is some susceptibility artifact from dental hardware.  Also some of the images are degraded by patient motion.     The diffusion-weighted images show no evidence of acute infarction.     FLAIR images show multiple areas of bright signal intensity in the  bilateral cerebral white matter consistent with small vessel white  matter ischemic disease. No intracranial hemorrhage is seen. There is  mild diffuse atrophy. There is an old lacunar infarction in the left  basal ganglia best seen on T1 axial series 6 image 17.     T2 images are degraded by patient motion and vascular flow voids cannot  be well seen. Craniocervical junction and sella appear normal.       Impression:       1. Limited study due to motion and dental hardware artifact.  2. No evidence of acute infarction or hemorrhage.  3. Moderate changes of bilateral small vessel white matter ischemic  disease.             Medication Review: done      Current Facility-Administered Medications:   •  acetaminophen (TYLENOL) tablet 650 mg, 650  mg, Oral, Q4H PRN, 650 mg at 12/18/17 2240 **OR** acetaminophen (TYLENOL) suppository 650 mg, 650 mg, Rectal, Q4H PRN, Dung Isbell MD  •  aluminum-magnesium hydroxide-simethicone (MAALOX MAX) 400-400-40 MG/5ML suspension 15 mL, 15 mL, Oral, Q6H PRN, Dung Isbell MD  •  atorvastatin (LIPITOR) tablet 40 mg, 40 mg, Oral, Nightly, Solomon Barrow MD, 40 mg at 12/18/17 2102  •  bisacodyl (DULCOLAX) EC tablet 5 mg, 5 mg, Oral, Daily PRN, Dung Isbell MD  •  bisacodyl (DULCOLAX) suppository 10 mg, 10 mg, Rectal, Daily PRN, Dung Isbell MD  •  dextrose (D50W) solution 25 g, 25 g, Intravenous, Q15 Min PRN, Dung Isbell MD  •  dextrose (D50W) solution 25 g, 25 g, Intravenous, Q15 Min PRN, Dung Isbell MD  •  dextrose (GLUTOSE) oral gel 15 g, 15 g, Oral, Q15 Min PRN, Dung Isbell MD  •  dextrose (GLUTOSE) oral gel 15 g, 15 g, Oral, Q15 Min PRN, Dung Isbell MD  •  enoxaparin (LOVENOX) syringe 40 mg, 40 mg, Subcutaneous, Q24H, Dung Isbell MD, 40 mg at 12/18/17 2102  •  glucagon (human recombinant) (GLUCAGEN DIAGNOSTIC) injection 1 mg, 1 mg, Subcutaneous, Q15 Min PRN, Dung Isbell MD  •  glucagon (human recombinant) (GLUCAGEN DIAGNOSTIC) injection 1 mg, 1 mg, Subcutaneous, Q15 Min PRN, Dung Isbell MD  •  hydrALAZINE (APRESOLINE) injection 20 mg, 20 mg, Intravenous, Q4H PRN, Dung Isbell MD, 20 mg at 12/17/17 0030  •  insulin aspart (novoLOG) injection 0-24 Units, 0-24 Units, Subcutaneous, 4x Daily With Meals & Nightly, uDng Isbell MD, 4 Units at 12/19/17 0900  •  insulin aspart (novoLOG) injection 9 Units, 9 Units, Subcutaneous, TID With Meals, Solomon Barrow MD  •  insulin detemir (LEVEMIR) injection 60 Units, 60 Units, Subcutaneous, Nightly, Carlos Emerson MD, 60 Units at 12/18/17 2231  •  ipratropium-albuterol (DUO-NEB) nebulizer solution 3 mL, 3 mL, Nebulization, Q6H PRN, Dung Isbell MD  •  levETIRAcetam in NaCl 0.82%  (KEPPRA) IVPB 500 mg, 500 mg, Intravenous, Q12H, Dung Isbell MD, 500 mg at 12/19/17 0902  •  levothyroxine (SYNTHROID, LEVOTHROID) tablet 150 mcg, 150 mcg, Oral, Q AM, Solomon Barrow MD, 150 mcg at 12/19/17 0503  •  LORazepam (ATIVAN) injection 1 mg, 1 mg, Intravenous, Q4H PRN, 1 mg at 12/17/17 1509 **OR** LORazepam (ATIVAN) injection 2 mg, 2 mg, Intravenous, Q4H PRN, Dung Isbell MD  •  LORazepam (ATIVAN) injection 2 mg, 2 mg, Intravenous, Q1H PRN, Dung Isbell MD  •  metoprolol tartrate (LOPRESSOR) injection 5 mg, 5 mg, Intravenous, Q6H, Dung Isbell MD, 5 mg at 12/19/17 0901  •  ondansetron (ZOFRAN) tablet 4 mg, 4 mg, Oral, Q6H PRN **OR** ondansetron ODT (ZOFRAN-ODT) disintegrating tablet 4 mg, 4 mg, Oral, Q6H PRN **OR** ondansetron (ZOFRAN) injection 4 mg, 4 mg, Intravenous, Q6H PRN, Dung Isbell MD  •  sodium chloride 0.9 % flush 1-10 mL, 1-10 mL, Intravenous, PRN, Dung Isbell MD  •  sodium chloride 0.9 % flush 10 mL, 10 mL, Intravenous, PRN, Marco Ferguson MD    Assessment/Plan     Active Hospital Problems (** Indicates Principal Problem)    Diagnosis Date Noted   • Altered mental status [R41.82] 12/15/2017   • Diabetes mellitus, type 2 [E11.9]    • Hyperlipidemia [E78.5]    • Primary hypothyroidism [E03.9]    • Osteoporosis [M81.0] 09/09/2015   • Proteinuria [R80.9] 02/28/2012      Resolved Hospital Problems    Diagnosis Date Noted Date Resolved   No resolved problems to display.     Type 2 diabetes mellitus-uncontrolled  Continue levemir 60 units at bedtime  Will increase NovoLog to 9 units with each meal  Decreased NovoLog sliding scale 2 3 times a day before meals and at bedtime-3 units for 50 about 1 50 mg/dL.    Okay for discharge from endocrine perspective.  Patient will be discharged on the current insulin regimen.  Discussed with the family that patient will benefit with a skilled nursing facility or assisted living and that it would be unsafe for her to  "live by herself.  During patient's office visits, and her prior hospitalizations it has been clear that patient has been noncompliant and suffered with injuries due to her living alone.     Hypothyroidism  Suspect compliance in this regard as well.  Patient got admitted in one of her prior hospitalizations with a TSH of above 100.  Will continue levothyroxine 150 µg oral daily.      Hyperlipidemia  Continue Lipitor 40 mg oral daily.    Called and spoke to the son over the phone.      Solomon Barrow MD.  12/19/17  1:46 PM      EMR Dragon / transcription disclaimer:    \"Dictated utilizing Dragon dictation\".   "

## 2017-12-19 NOTE — PLAN OF CARE
Problem: Patient Care Overview (Adult)  Goal: Plan of Care Review  Outcome: Ongoing (interventions implemented as appropriate)    12/19/17 0425   Patient Care Overview   Progress no change   Coping/Psychosocial Response Interventions   Plan Of Care Reviewed With patient   Outcome Evaluation   Outcome Summary/Follow up Plan Pt on seizure precaution.No seizure activity noted.Pt c/o headache last night.PRN Tylenol given with relief noted.Pt possible discharge today to rehab.       Goal: Adult Individualization and Mutuality  Outcome: Ongoing (interventions implemented as appropriate)    Problem: Fall Risk (Adult)  Goal: Identify Related Risk Factors and Signs and Symptoms  Outcome: Ongoing (interventions implemented as appropriate)  Goal: Absence of Falls  Outcome: Ongoing (interventions implemented as appropriate)    Problem: Pain, Acute (Adult)  Goal: Identify Related Risk Factors and Signs and Symptoms  Outcome: Ongoing (interventions implemented as appropriate)  Goal: Acceptable Pain Control/Comfort Level  Outcome: Ongoing (interventions implemented as appropriate)    Problem: Hyperglycemia, Persistent (Adult)  Goal: Signs and Symptoms of Listed Potential Problems Will be Absent or Manageable (Hyperglycemia, Persistent)  Outcome: Ongoing (interventions implemented as appropriate)    Problem: Seizure Disorder/Epilepsy (Adult)  Goal: Signs and Symptoms of Listed Potential Problems Will be Absent or Manageable (Seizure Disorder/Epilepsy)  Outcome: Ongoing (interventions implemented as appropriate)

## 2017-12-19 NOTE — CONSULTS
"Diabetes Education  Assessment/Teaching    Patient Name:  Halie Guidry  YOB: 1940  MRN: [id number]  Admit Date:  12/15/2017      Assessment Date:  12/19/2017       Most Recent Value    General Information      Referral From:  MD order [A1C 11.5%]    Height  172.7 cm (68\")    Height Method  Stated    Weight  85.8 kg (189 lb 2.5 oz)    Weight Method  Bed scale    Pregnancy Assessment     Diabetes History     What type of diabetes do you have?  Type 2    Length of Diabetes Diagnosis  10 + years    Current DM knowledge  poor    Do you test your blood sugar at home?  yes    Have you had low blood sugar? (<70mg/dl)  yes    How often do you have low blood sugar?  rare    Have you had high blood sugar? (>140mg/dl)  yes    How often do you have high blood sugar?  frequently    How would you rate your diabetes control?  poor    What makes it difficult for you to take care of your diabetes or yourself?  -- [pt states that getting her meds is an issue,states she would take  her meds if they were there for her to take]    Education Preferences     What areas of diabetes would you like to learn about?  avoiding high blood sugar, medications for diabetes, stress/coping skills    Barriers to Learning  other (comment) [memory issues]    Nutrition Information     Assessment Topics     Healthy Eating - Assessment  N/A-unable to assess [pt states she has been drinking juice alot]    Being Active - Assessment  N/A- unable to assess    Taking Medication - Assessment  Needs education    Problem Solving - Assessment  Needs education    Reducing Risk - Assessment  Needs education    Healthy Coping - Assessment  Needs education    Monitoring - Assessment  N/A-unable to assess [states she tests couple times day]    DM Goals                Most Recent Value    DM Education Needs     Meter  Has own    Meter Type  Other (comment)    Frequency of Testing  2 times a day [pt states she tests couple times day]    Medication  " Insulin [reviewed patient insulin regime,states she takes her meds 90% time if they are there for her,but was unable to be specific on what and how much she took]    Problem Solving  Hyperglycemia, Signs, Symptoms    Healthy Coping  Frustrated, Appropriate [was able to converse but got frustrated with lack of memory on some topics]    Discharge Plan  Facility [Note plans for skilled rehab.  Pt will need to be re-assessed close to discharge from facility for possible assistance w/ DM self care.  ]    Motivation  Moderate    Teaching Method  Explanation, Discussion, Teach back    Patient Response  -- [Staff RN states pt still confused. Discussed above with staff RN and CCP regarding pt needing assistance w/ DM self care and needing to be reassessed at discharge from rehab.  ]            Other Comments:          Electronically signed by:  Shani Villagomez RN  12/19/17 11:38 AM

## 2017-12-19 NOTE — PLAN OF CARE
Problem: Patient Care Overview (Adult)  Goal: Plan of Care Review  Outcome: Ongoing (interventions implemented as appropriate)    12/19/17 3092   Patient Care Overview   Progress progress towards functional goals is fair   Coping/Psychosocial Response Interventions   Plan Of Care Reviewed With patient   Outcome Evaluation   Outcome Summary/Follow up Plan Pt very confused and required A to maintain safety and I with bed mobility, transfer, and amb with RWX

## 2017-12-19 NOTE — PROGRESS NOTES
Continued Stay Note  [facility]     Patient Name: Halie Guidry  MRN: [id number]  Today's Date: 12/19/2017    Admit Date: 12/15/2017          Discharge Plan       12/19/17 8634    Case Management/Social Work Plan    Additional Comments Spoke with Shani/ DM Educator and pt's RN who are concerned that pt will need LTC, or at least help managing her meds karina after rehab.   CCP had long discussion with pt's son, Josse about plans after rehab.  Options discussed including pt staying with family with HH and possible pvt pay caregivers when family not available, vs a LTC/ Medicaid pending bed or PC bed at a facility. Referral made to [facility] per son's consent, but he then decided his choice is [facility].  He will reasearch other options while pt is in rehab.  DC orders noted.  [facility]oke with [facility], bed remains available.  Spoke with pt who is in agreement DC to [facility] today.  DC summary and DC packet given to RN.  [facility] ambulance arranged for 1900.    Final Note    Final Note Pt to be DC'd to skilled bed at [facility].          Expected Discharge Date and Time     Expected Discharge Date Expected Discharge Time    Dec 19, 2017             Vannessa Argueta, PAUL

## 2017-12-19 NOTE — PLAN OF CARE
Problem: Patient Care Overview (Adult)  Goal: Plan of Care Review  Outcome: Ongoing (interventions implemented as appropriate)    12/19/17 0421 12/19/17 3381   Patient Care Overview   Progress no change --    Coping/Psychosocial Response Interventions   Plan Of Care Reviewed With --  patient   Outcome Evaluation   Outcome Summary/Follow up Plan --  Pt alert but confused. VSS. SR on monitor. No seizure activity noted. Denies pain or discomfort. Waiting for rehab placement. Will continue to monitor.       Goal: Discharge Needs Assessment  Outcome: Ongoing (interventions implemented as appropriate)    Problem: Fall Risk (Adult)  Goal: Identify Related Risk Factors and Signs and Symptoms  Outcome: Ongoing (interventions implemented as appropriate)  Goal: Absence of Falls  Outcome: Ongoing (interventions implemented as appropriate)    Problem: Pain, Acute (Adult)  Goal: Identify Related Risk Factors and Signs and Symptoms  Outcome: Ongoing (interventions implemented as appropriate)  Goal: Acceptable Pain Control/Comfort Level  Outcome: Ongoing (interventions implemented as appropriate)    Problem: Hyperglycemia, Persistent (Adult)  Goal: Signs and Symptoms of Listed Potential Problems Will be Absent or Manageable (Hyperglycemia, Persistent)  Outcome: Ongoing (interventions implemented as appropriate)    Problem: Seizure Disorder/Epilepsy (Adult)  Goal: Signs and Symptoms of Listed Potential Problems Will be Absent or Manageable (Seizure Disorder/Epilepsy)  Outcome: Ongoing (interventions implemented as appropriate)    Problem: Pressure Ulcer Risk (Luis Scale) (Adult,Obstetrics,Pediatric)  Goal: Identify Related Risk Factors and Signs and Symptoms  Outcome: Ongoing (interventions implemented as appropriate)  Goal: Skin Integrity  Outcome: Ongoing (interventions implemented as appropriate)

## 2017-12-19 NOTE — PROGRESS NOTES
Continued Stay Note  [facility]     Patient Name: Halie Guidry  MRN: [id number]  Today's Date: 12/19/2017    Admit Date: 12/15/2017          Discharge Plan       12/19/17 0956    Case Management/Social Work Plan    Plan Plan is for skilled bed at [facility]     Additional Comments Received message from pt's son that their choice for rehab is [facility].   Per [facility], bed is available today if pt is ready for DC.              Vannessa Argueta RN

## 2017-12-19 NOTE — THERAPY TREATMENT NOTE
Acute Care - Physical Therapy Treatment Note  [facility]     Patient Name: Halie Guidry  : 1940  MRN: [id number]  Today's Date: 2017  Onset of Illness/Injury or Date of Surgery Date: 12/15/17  Date of Referral to PT: 17  Referring Physician: Logan    Admit Date: 12/15/2017    Visit Dx:    ICD-10-CM ICD-9-CM   1. Altered mental status, unspecified altered mental status type R41.82 780.97   2. Seizures R56.9 780.39   3. Hyperglycemia R73.9 790.29   4. Lactic acidosis E87.2 276.2   5. Impaired functional mobility and activity tolerance Z74.09 V49.89     Patient Active Problem List   Diagnosis   • Compression fracture   • Lumbar degenerative disc disease   • Diabetes mellitus, type 2   • Hyperlipidemia   • Benign essential hypertension   • Primary hypothyroidism   • Leukocytosis   • Neuropathy   • Osteoporosis   • Proteinuria   • Tobacco abuse   • Diabetic eye exam   • Generalized weakness   • Spinal stenosis   • Scoliosis   • Arthritis   • VBI (vertebrobasilar insufficiency)   • Orthostatic hypotension   • 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   • Severe hypothyroidism   • Noncompliance   • Vitamin D deficiency disease   • Altered mental status               Adult Rehabilitation Note       17 1500          Rehab Assessment/Intervention    Discipline physical therapy assistant  -CW      Document Type therapy note (daily note)  -CW      Subjective Information agree to therapy;complains of;weakness;fatigue  -CW      Patient Effort, Rehab Treatment fair  -CW      Precautions/Limitations fall precautions  -CW      Recorded by [CW] Xavi Danielle PTA      Vital Signs    O2 Delivery Pre Treatment supplemental O2  -CW      O2 Delivery Intra Treatment room air  -CW      O2 Delivery Post Treatment room air  -CW      Recorded by [CW] Xavi Danielle PTA      Pain Assessment    Pain Assessment No/denies pain  -CW      Recorded by [CW] Xavi Danielle PTA       Cognitive Assessment/Intervention    Current Cognitive/Communication Assessment impaired  -CW      Orientation Status oriented to;person  -CW      Follows Commands/Answers Questions 75% of the time;needs cueing;needs increased time;needs repetition  -CW      Personal Safety moderate impairment;decreased awareness, need for assist;decreased awareness, need for safety;decreased insight to deficits;unaware of cognitive deficits;unaware of consequences of deficits  -CW      Personal Safety Interventions fall prevention program maintained;gait belt;muscle strengthening facilitated;nonskid shoes/slippers when out of bed  -CW      Recorded by [CW] Xavi Danielle PTA      Bed Mobility, Assessment/Treatment    Bed Mob, Supine to Sit, [place] moderate assist (50% patient effort)  -CW      Bed Mob, Sit to Supine, [place] moderate assist (50% patient effort)  -CW      Recorded by [CW] Xaiv Danielle PTA      Transfer Assessment/Treatment    Transfers, Sit-Stand [place] minimum assist (75% patient effort);2 person assist required  -CW      Transfers, Stand-Sit [place] minimum assist (75% patient effort);2 person assist required  -CW      Transfers, Sit-Stand-Sit, Assist Device rolling walker  -CW      Recorded by [CW] Xavi Danielle PTA      Gait Assessment/Treatment    Gait, [place] Level minimum assist (75% patient effort);2 person assist required  -CW      Gait, Assistive Device rolling walker  -CW      Gait, Distance (Feet) 40  -CW      Gait, Gait Deviations tess decreased;forward flexed posture;step length decreased;stride length decreased  -CW      Recorded by [CW] Xavi Danielle PTA      Positioning and Restraints    Pre-Treatment Position in bed  -CW      Post Treatment Position bed  -CW      In Bed notified nsg;supine;call light within reach;encouraged to call for assist;exit alarm on  -CW      Recorded by [CW] Xavi Danielle PTA        User Key  (r) = Recorded By,  (t) = Taken By, (c) = Cosigned By    Initials Name Effective Dates    CW Xavi Danielle, PTA 12/13/16 -                 IP PT Goals       12/18/17 1605          Bed Mobility PT LTG    Bed Mobility PT LTG, Date Established 12/18/17  -PC      Bed Mobility PT LTG, Time to Achieve 1 wk  -PC      Bed Mobility PT LTG, Activity Type supine to sit/sit to supine  -PC      Bed Mobility PT LTG, [place] Level supervision required  -PC      Transfer Training PT LTG    Transfer Training PT LTG, Date Established 12/18/17  -PC      Transfer Training PT LTG, Time to Achieve 1 wk  -PC      Transfer Training PT LTG, Activity Type sit to stand/stand to sit  -PC      Transfer Training PT LTG, [place] Level supervision required  -PC      Gait Training PT LTG    Gait Training Goal PT LTG, Date Established 12/18/17  -PC      Gait Training Goal PT LTG, Time to Achieve 1 wk  -PC      Gait Training Goal PT LTG, [place] Level minimum assist (75% patient effort)  -PC      Gait Training Goal PT LTG, Assist Device walker, rolling  -PC      Gait Training Goal PT LTG, Distance to Achieve 25 ft  -PC        User Key  (r) = Recorded By, (t) = Taken By, (c) = Cosigned By    Initials Name Provider Type    ARRON Menjivar PT Physical Therapist          Physical Therapy Education     Title: PT OT SLP Therapies (Active)     Topic: Physical Therapy (Active)     Point: Mobility training (Active)    Learning Progress Summary    Learner Readiness Method Response Comment Documented by Status   Patient Acceptance D,E NR   12/19/17 1532 Active    Acceptance E,D NR  PC 12/18/17 1604 Active               Point: Home exercise program (Active)    Learning Progress Summary    Learner Readiness Method Response Comment Documented by Status   Patient Acceptance D,E NR   12/19/17 1532 Active    Acceptance E,D NR  PC 12/18/17 1604 Active               Point: Body mechanics (Active)    Learning Progress Summary    Learner Readiness Method Response  Comment Documented by Status   Patient Acceptance D,E NR  CW 12/19/17 1532 Active    Acceptance E,D NR  PC 12/18/17 1604 Active               Point: Precautions (Active)    Learning Progress Summary    Learner Readiness Method Response Comment Documented by Status   Patient Acceptance D,E NR  CW 12/19/17 1532 Active    Acceptance E,D NR  PC 12/18/17 1604 Active                      User Key     Initials Effective Dates Name Provider Type Discipline     12/01/15 -  Lenora Menjivar, PT Physical Therapist PT     12/13/16 -  Xavi Danielle, PTA Physical Therapy Assistant PT                    PT Recommendation and Plan  Anticipated Discharge Disposition: skilled nursing facility  Planned Therapy Interventions: gait training, bed mobility training, balance training, strengthening, transfer training  PT Frequency: daily  Plan of Care Review  Plan Of Care Reviewed With: patient  Progress: progress towards functional goals is fair  Outcome Summary/Follow up Plan: Pt very confused and required A to maintain safety and I with bed mobility, transfer, and amb with RWX          Outcome Measures       12/19/17 1500 12/18/17 1600       How much help from another person do you currently need...    Turning from your back to your side while in flat bed without using bedrails? 2  -CW 3  -PC     Moving from lying on back to sitting on the side of a flat bed without bedrails? 2  -CW 2  -PC     Moving to and from a bed to a chair (including a wheelchair)? 2  -CW 2  -PC     Standing up from a chair using your arms (e.g., wheelchair, bedside chair)? 2  -CW 2  -PC     Climbing 3-5 steps with a railing? 1  -CW 1  -PC     To walk in hospital room? 2  -CW 2  -PC     AM-PAC 6 Clicks Score 11  -CW 12  -PC     Functional Assessment    Outcome Measure Options AM-PAC 6 Clicks Basic Mobility (PT)  -CW AM-PAC 6 Clicks Basic Mobility (PT)  -PC       User Key  (r) = Recorded By, (t) = Taken By, (c) = Cosigned By    Initials Name Provider Type     PC Lenora Menjivar, PT Physical Therapist    CW Xavi Danielle PTA Physical Therapy Assistant           Time Calculation:         PT Charges       12/19/17 1533          Time Calculation    Start Time 1515  -CW      Stop Time 1533  -CW      Time Calculation (min) 18 min  -CW      PT Received On 12/19/17  -CW      PT - Next Appointment 12/20/17  -CW        User Key  (r) = Recorded By, (t) = Taken By, (c) = Cosigned By    Initials Name Provider Type    CW Xavi Danielle PTA Physical Therapy Assistant          Therapy Charges for Today     Code Description Service Date Service Provider Modifiers Qty    66825870121 HC PT THER PROC EA 15 MIN 12/19/2017 Xavi Danielle PTA GP 1    85239463852 HC PT THER SUPP EA 15 MIN 12/19/2017 Xavi Danielle PTA GP 1          PT G-Codes  Outcome Measure Options: AM-PAC 6 Clicks Basic Mobility (PT)    Xavi Danielle PTA  12/19/2017

## 2017-12-20 LAB
GAD65 AB SER-ACNC: <5 U/ML (ref 0–5)
IF BLOCK AB SER-ACNC: 1.1 AU/ML (ref 0–1.1)

## 2017-12-20 NOTE — PROGRESS NOTES
Case Management Discharge Note    Final Note: Pt to be DC'd to skilled bed at [facility].    Discharge Placement     Facility/Agency Request Status Selected? Address Phone Number Fax Number    [facility] Accepted    Yes [address] [facility] [address] [phone]        Vannessa Argueta, PAUL 12/18/2017 17:53    Pt is approved pending bed.               [facility] Pending - Request Sent     [address] [facility] [address] [phone]        Vannessa Argueta RN 12/18/2017 17:53    Pt is approved pending bed.  Bed is available on Tuesday.               [facility] - Request Sent     [address] [facility] [address] [phone]        [facility]ulance: [facility]    Discharge Codes: 03  Discharged/transferred to skilled nursing facility (SNF) with Medicare certification in anticipation of skilled care

## 2018-01-09 ENCOUNTER — OFFICE VISIT (OUTPATIENT)
Dept: NEUROLOGY | Facility: CLINIC | Age: 78
End: 2018-01-09

## 2018-01-09 VITALS
DIASTOLIC BLOOD PRESSURE: 89 MMHG | HEIGHT: 65 IN | WEIGHT: 185 LBS | SYSTOLIC BLOOD PRESSURE: 180 MMHG | BODY MASS INDEX: 30.82 KG/M2

## 2018-01-09 DIAGNOSIS — G62.9 NEUROPATHY: ICD-10-CM

## 2018-01-09 DIAGNOSIS — R25.1 TREMOR: ICD-10-CM

## 2018-01-09 DIAGNOSIS — R53.1 GENERALIZED WEAKNESS: Primary | ICD-10-CM

## 2018-01-09 PROCEDURE — 99214 OFFICE O/P EST MOD 30 MIN: CPT | Performed by: PSYCHIATRY & NEUROLOGY

## 2018-01-09 RX ORDER — AMOXICILLIN AND CLAVULANATE POTASSIUM 875; 125 MG/1; MG/1
1 TABLET, FILM COATED ORAL 2 TIMES DAILY
COMMUNITY
End: 2018-10-24

## 2018-01-09 RX ORDER — FLUTICASONE PROPIONATE 50 MCG
2 SPRAY, SUSPENSION (ML) NASAL DAILY
COMMUNITY
End: 2019-02-14

## 2018-01-09 RX ORDER — GUAIFENESIN 600 MG/1
1200 TABLET, EXTENDED RELEASE ORAL 2 TIMES DAILY
COMMUNITY
End: 2019-02-14

## 2018-01-09 RX ORDER — ACETAMINOPHEN 500 MG
500 TABLET ORAL EVERY 6 HOURS PRN
COMMUNITY
End: 2019-02-14

## 2018-01-09 RX ORDER — CETIRIZINE HYDROCHLORIDE 10 MG/1
10 TABLET ORAL DAILY
COMMUNITY
End: 2019-02-14

## 2018-01-09 RX ORDER — PROMETHAZINE HYDROCHLORIDE 12.5 MG/1
12.5 TABLET ORAL EVERY 6 HOURS PRN
COMMUNITY
End: 2019-02-14

## 2018-01-09 RX ORDER — LANSOPRAZOLE 30 MG/1
30 CAPSULE, DELAYED RELEASE ORAL DAILY
COMMUNITY
End: 2022-03-04 | Stop reason: HOSPADM

## 2018-01-09 NOTE — PROGRESS NOTES
Subjective:     Patient ID: Halie Guidry is a 77 y.o. female.    History of Present Illness     The patient was seen in the office for a new problem of tremor.  This is the first time I've seen the patient.  Patient was seen in the hospital by Dr. Fulton in May of this past year for generalized weakness.  She was evaluated in mid December of this past year by Dr. Ford following having 2 seizures.  The tremor is been present for a couple of years.  Nothing seems to make it better or worse.  It does not bother her.  People bother her about it.  She has severe diabetes and is hypothyroid as well which is currently being treated.  Areas in both hands right greater than left and is fairly constant.  History was also taken from the daughter.  The following portions of the patient's history were reviewed and updated as appropriate: allergies, current medications, past family history, past medical history, past social history, past surgical history and problem list.      Current Outpatient Prescriptions:   •  acetaminophen (TYLENOL) 500 MG tablet, Take 500 mg by mouth Every 6 (Six) Hours As Needed for Mild Pain ., Disp: , Rfl:   •  amoxicillin-clavulanate (AUGMENTIN) 875-125 MG per tablet, Take 1 tablet by mouth 2 (Two) Times a Day., Disp: , Rfl:   •  atorvastatin (LIPITOR) 40 MG tablet, Take 1 tablet by mouth Every Night., Disp: , Rfl:   •  cetirizine (zyrTEC) 10 MG tablet, Take 10 mg by mouth Daily., Disp: , Rfl:   •  fluticasone (FLONASE) 50 MCG/ACT nasal spray, 2 sprays into each nostril Daily., Disp: , Rfl:   •  guaiFENesin (MUCINEX) 600 MG 12 hr tablet, Take 1,200 mg by mouth 2 (Two) Times a Day., Disp: , Rfl:   •  insulin aspart (novoLOG) 100 UNIT/ML injection, Inject 0-15 Units under the skin 4 (Four) Times a Day With Meals & at Bedtime., Disp: , Rfl: 12  •  insulin aspart (novoLOG) 100 UNIT/ML injection, Inject 9 Units under the skin 3 (Three) Times a Day With Meals., Disp: , Rfl: 12  •  insulin detemir  (LEVEMIR) 100 UNIT/ML injection, Inject 60 Units under the skin Every Night., Disp: , Rfl:   •  lansoprazole (PREVACID) 30 MG capsule, Take 30 mg by mouth Daily., Disp: , Rfl:   •  levothyroxine (SYNTHROID, LEVOTHROID) 150 MCG tablet, Take 1 tablet by mouth Daily., Disp: , Rfl:   •  promethazine (PHENERGAN) 12.5 MG tablet, Take 12.5 mg by mouth Every 6 (Six) Hours As Needed for Nausea or Vomiting., Disp: , Rfl:     Review of Systems   Constitutional: Positive for activity change, fatigue and fever. Negative for appetite change, chills, diaphoresis and unexpected weight change.   HENT: Positive for congestion, dental problem, rhinorrhea, sinus pressure and sore throat. Negative for drooling, ear discharge, ear pain, facial swelling, hearing loss, mouth sores, nosebleeds, postnasal drip, sinus pain, sneezing, tinnitus, trouble swallowing and voice change.    Neurological: Positive for seizures. Negative for dizziness, tremors, syncope, facial asymmetry, speech difficulty, weakness, light-headedness, numbness and headaches.   Psychiatric/Behavioral: Negative.         Objective:    Neurologic Exam  Mental status examination was appropriate.  Funduscopy, visual fields, eye movements and pupillary reflexes were normal.  No facial weakness was noted.  Gait was aided by a walker. No pattern of focal weakness was noted. Is hyporeflexic.  Low amplitude high frequency tremor right greater than left.  Worse with arms outstretched.  Tone is normal.  No bradykinesia is noted.  Physical Exam    Assessment/Plan:     Halie was seen today for tremors.    Diagnoses and all orders for this visit:    Generalized weakness    Neuropathy    Tremor          Tremors most consistent with senile tremor.  I reviewed possible symptomatic treatment options and she is in agreement that none of these are warranted.  Have placed her on no medicine.  No further testing is necessary.  Her problems with weakness and neuropathy are stable.  I  discussed the above with the family as well.  I plan to see her as needed in the office.   Thank you for allowing me to share in the care of this patient.  Napoleon Dey M.D.

## 2018-01-10 ENCOUNTER — TELEPHONE (OUTPATIENT)
Dept: FAMILY MEDICINE CLINIC | Facility: CLINIC | Age: 78
End: 2018-01-10

## 2018-01-22 ENCOUNTER — OFFICE VISIT (OUTPATIENT)
Dept: FAMILY MEDICINE CLINIC | Facility: CLINIC | Age: 78
End: 2018-01-22

## 2018-01-22 ENCOUNTER — TELEPHONE (OUTPATIENT)
Dept: ENDOCRINOLOGY | Age: 78
End: 2018-01-22

## 2018-01-22 VITALS
WEIGHT: 185 LBS | SYSTOLIC BLOOD PRESSURE: 178 MMHG | HEIGHT: 65 IN | TEMPERATURE: 98 F | HEART RATE: 75 BPM | RESPIRATION RATE: 20 BRPM | DIASTOLIC BLOOD PRESSURE: 65 MMHG | BODY MASS INDEX: 30.82 KG/M2

## 2018-01-22 DIAGNOSIS — I10 BENIGN ESSENTIAL HYPERTENSION: ICD-10-CM

## 2018-01-22 DIAGNOSIS — Z79.4 UNCONTROLLED TYPE 2 DIABETES MELLITUS WITH HYPERGLYCEMIA, WITH LONG-TERM CURRENT USE OF INSULIN (HCC): ICD-10-CM

## 2018-01-22 DIAGNOSIS — E11.65 UNCONTROLLED TYPE 2 DIABETES MELLITUS WITH HYPERGLYCEMIA, WITH LONG-TERM CURRENT USE OF INSULIN (HCC): ICD-10-CM

## 2018-01-22 DIAGNOSIS — R56.9 SEIZURE (HCC): Primary | ICD-10-CM

## 2018-01-22 PROCEDURE — 99214 OFFICE O/P EST MOD 30 MIN: CPT | Performed by: FAMILY MEDICINE

## 2018-01-22 RX ORDER — AMLODIPINE AND VALSARTAN 5; 320 MG/1; MG/1
1 TABLET ORAL EVERY MORNING
Qty: 90 TABLET | Refills: 1 | Status: SHIPPED | OUTPATIENT
Start: 2018-01-22 | End: 2018-05-24 | Stop reason: SDUPTHER

## 2018-01-22 RX ORDER — BISOPROLOL FUMARATE 5 MG/1
5 TABLET, FILM COATED ORAL
Qty: 90 TABLET | Refills: 1 | Status: SHIPPED | OUTPATIENT
Start: 2018-01-22 | End: 2018-05-24 | Stop reason: SDUPTHER

## 2018-01-22 RX ORDER — HYDROCHLOROTHIAZIDE 12.5 MG/1
12.5 TABLET ORAL DAILY
Qty: 90 TABLET | Refills: 1 | Status: SHIPPED | OUTPATIENT
Start: 2018-01-22 | End: 2018-05-24 | Stop reason: SDUPTHER

## 2018-01-22 NOTE — PROGRESS NOTES
Subjective   Halie Guidry is a 77 y.o. female.     CC: Hospital F/U for Seizure/Hyperglycemia    History of Present Illness     Pt returns today for the above. That visit was as follows:    Admit date: 12/15/2017  Discharge date: 12/19/2017     Admission/discharge diagnosis:  1. New onset seizure --  likely was due to hyperglycemia  2. Hyperglycemia --   3. Leukocytosis --   4. Altered mental status   5. DMII -- poorly controlled     HPI:  77-year-old white female brought in by paramedics after seizure-like activity at the hair salon earlier today.  Was found down by salon staff on the restroom floor.  Was felt to be down for approximately 30 minutes before found.  Staff noticed that the patient had tonic clonic type activity for brief period of time.  Patient was unresponsive upon paramedics arrival.  Glucose due to be greater than 600.  Patient.  Postictal upon arrival to the emergency room.  Had another seizure-like activity with associated altered mental status while in the emergency room.  Receiving Keppra.  CT head and neck reviewed.  Discussed with ER staff.  No current family available.  Patient is unable give any meaningful history currently secondary to altered mental status.     Hospital Course:  Mrs. Guidry improved steadily but gradually.  She had no further seizure activity.  She was seen in consultation by both neurology and endocrine.      Her neurology consult was as follows:    IMPRESSION: In all likelihood this is diabetic hyperosmolar state resulting in seizure.  Calculated osmolarity of 350 is high enough to induce neurologic symptoms.  Though hyperosmolar state resolved as per labs, I dont expect the neurologic sequela to necessarily resolve immediately and also could be component of post ictal state.      For now I think we should assume that this is a problem but if she doesn't come around more completely and next 24 hours we may need to consider other possibilities.  For Now I can't  continue the Keppra and supportive care and I'll follow her with you.    Interestingly, she drinks Orange Juice all day long AMA.     She had her BP meds held at d/c and is now simply back on HCTZ and Bisoprolol.    Current medication list is compared to recent hospital d/c and the medications are reconciled.    The following portions of the patient's history were reviewed and updated as appropriate: allergies, current medications, past family history, past medical history, past social history, past surgical history and problem list.    Review of Systems   Constitutional: Negative for activity change, chills, fatigue and fever.   Respiratory: Negative for cough and chest tightness.    Cardiovascular: Negative for chest pain and palpitations.   Gastrointestinal: Negative for abdominal pain and nausea.   Endocrine: Negative for cold intolerance.   Psychiatric/Behavioral: Negative for behavioral problems and dysphoric mood.       Objective   Physical Exam   Constitutional: She appears well-developed and well-nourished.   Neck: Neck supple. No thyromegaly present.   Cardiovascular: Normal rate and regular rhythm.    No murmur heard.  Pulmonary/Chest: Effort normal and breath sounds normal.   Abdominal: Bowel sounds are normal.   Psychiatric: She has a normal mood and affect. Her behavior is normal.   Nursing note and vitals reviewed.  Hospital records reviewed with pt confirming HPI.      Assessment/Plan   Halie was seen today for diabetes and hypertension.    Diagnoses and all orders for this visit:    Seizure    Uncontrolled type 2 diabetes mellitus with hyperglycemia, with long-term current use of insulin    Benign essential hypertension  -     hydrochlorothiazide (HYDRODIURIL) 12.5 MG tablet; Take 1 tablet by mouth Daily.  -     bisoprolol (ZEBeta) 5 MG tablet; Take 1 tablet by mouth Daily.  -     amLODIPine-valsartan (EXFORGE) 5-320 MG per tablet; Take 1 tablet by mouth Every Morning.      Strongly encouraged pt  to f/u with her Endocrinologist and to take her DM seriously.  My nurse is calling tomorrow to get her an appt with her Endocrinologist.

## 2018-01-22 NOTE — TELEPHONE ENCOUNTER
Home health called about patient she was having low blood sugar in the afternoon low in 65 and high as 271  Home health wasn't sure if we needed to make medication adjustment.

## 2018-01-23 ENCOUNTER — TELEPHONE (OUTPATIENT)
Dept: ENDOCRINOLOGY | Age: 78
End: 2018-01-23

## 2018-01-23 NOTE — TELEPHONE ENCOUNTER
HOME HEALTH CALLED ABOUT PATIENT THAT SHE IS HAVING LOW BLOOD SUGAR READING IN THE AFTERNOON AND WANTED TO KNOW IF WE NEEDED TO MAKE CHANGES TO HER MEDICATION    PER DR CASE  PATIENT SHOULD DECREASE HER NOVOLOG TO 6 UNIT 3 TIMES DAILY AND LEVEMIR TO 50 UNTIS AT NIGHT  I HAD PATIENT WRITE ALL THIS DOWN SO SHE COULD REMEMBER PATIENT WANTED TO KNOW IF SHE NEEDED A FOLLOW UP APPOT.

## 2018-01-25 ENCOUNTER — TELEPHONE (OUTPATIENT)
Dept: FAMILY MEDICINE CLINIC | Facility: CLINIC | Age: 78
End: 2018-01-25

## 2018-03-29 DIAGNOSIS — G62.9 NEUROPATHY: ICD-10-CM

## 2018-03-29 DIAGNOSIS — M51.36 LUMBAR DEGENERATIVE DISC DISEASE: ICD-10-CM

## 2018-03-29 RX ORDER — DULOXETIN HYDROCHLORIDE 60 MG/1
60 CAPSULE, DELAYED RELEASE ORAL DAILY
Qty: 90 CAPSULE | Refills: 1 | Status: SHIPPED | OUTPATIENT
Start: 2018-03-29 | End: 2018-05-24 | Stop reason: SDUPTHER

## 2018-05-10 ENCOUNTER — TELEPHONE (OUTPATIENT)
Dept: FAMILY MEDICINE CLINIC | Facility: CLINIC | Age: 78
End: 2018-05-10

## 2018-05-10 DIAGNOSIS — I10 BENIGN ESSENTIAL HYPERTENSION: ICD-10-CM

## 2018-05-10 DIAGNOSIS — E78.5 HYPERLIPIDEMIA, UNSPECIFIED HYPERLIPIDEMIA TYPE: Primary | ICD-10-CM

## 2018-05-15 ENCOUNTER — RESULTS ENCOUNTER (OUTPATIENT)
Dept: FAMILY MEDICINE CLINIC | Facility: CLINIC | Age: 78
End: 2018-05-15

## 2018-05-15 DIAGNOSIS — I10 BENIGN ESSENTIAL HYPERTENSION: ICD-10-CM

## 2018-05-15 DIAGNOSIS — E78.5 HYPERLIPIDEMIA, UNSPECIFIED HYPERLIPIDEMIA TYPE: ICD-10-CM

## 2018-05-18 LAB
ALBUMIN SERPL-MCNC: 4.2 G/DL (ref 3.5–5.2)
ALBUMIN/GLOB SERPL: 1.4 G/DL
ALP SERPL-CCNC: 65 U/L (ref 39–117)
ALT SERPL-CCNC: 16 U/L (ref 1–33)
AST SERPL-CCNC: 18 U/L (ref 1–32)
BASOPHILS # BLD AUTO: 0.08 10*3/MM3 (ref 0–0.2)
BASOPHILS NFR BLD AUTO: 0.5 % (ref 0–1.5)
BILIRUB SERPL-MCNC: 0.4 MG/DL (ref 0.1–1.2)
BUN SERPL-MCNC: 20 MG/DL (ref 8–23)
BUN/CREAT SERPL: 23.8 (ref 7–25)
CALCIUM SERPL-MCNC: 10.6 MG/DL (ref 8.6–10.5)
CHLORIDE SERPL-SCNC: 97 MMOL/L (ref 98–107)
CHOLEST SERPL-MCNC: 125 MG/DL (ref 0–200)
CO2 SERPL-SCNC: 26.1 MMOL/L (ref 22–29)
CREAT SERPL-MCNC: 0.84 MG/DL (ref 0.57–1)
EOSINOPHIL # BLD AUTO: 0.73 10*3/MM3 (ref 0–0.7)
EOSINOPHIL NFR BLD AUTO: 4.3 % (ref 0.3–6.2)
ERYTHROCYTE [DISTWIDTH] IN BLOOD BY AUTOMATED COUNT: 14.6 % (ref 11.7–13)
GFR SERPLBLD CREATININE-BSD FMLA CKD-EPI: 66 ML/MIN/1.73
GFR SERPLBLD CREATININE-BSD FMLA CKD-EPI: 80 ML/MIN/1.73
GLOBULIN SER CALC-MCNC: 3.1 GM/DL
GLUCOSE SERPL-MCNC: 149 MG/DL (ref 65–99)
HCT VFR BLD AUTO: 43.4 % (ref 35.6–45.5)
HDLC SERPL-MCNC: 32 MG/DL (ref 40–60)
HGB BLD-MCNC: 13.7 G/DL (ref 11.9–15.5)
IMM GRANULOCYTES # BLD: 0.04 10*3/MM3 (ref 0–0.03)
IMM GRANULOCYTES NFR BLD: 0.2 % (ref 0–0.5)
LDLC SERPL CALC-MCNC: 61 MG/DL (ref 0–100)
LDLC/HDLC SERPL: 1.89 {RATIO}
LYMPHOCYTES # BLD AUTO: 2.56 10*3/MM3 (ref 0.9–4.8)
LYMPHOCYTES NFR BLD AUTO: 15.2 % (ref 19.6–45.3)
MCH RBC QN AUTO: 29.2 PG (ref 26.9–32)
MCHC RBC AUTO-ENTMCNC: 31.6 G/DL (ref 32.4–36.3)
MCV RBC AUTO: 92.5 FL (ref 80.5–98.2)
MONOCYTES # BLD AUTO: 0.84 10*3/MM3 (ref 0.2–1.2)
MONOCYTES NFR BLD AUTO: 5 % (ref 5–12)
NEUTROPHILS # BLD AUTO: 12.55 10*3/MM3 (ref 1.9–8.1)
NEUTROPHILS NFR BLD AUTO: 74.8 % (ref 42.7–76)
PLATELET # BLD AUTO: 459 10*3/MM3 (ref 140–500)
POTASSIUM SERPL-SCNC: 4.6 MMOL/L (ref 3.5–5.2)
PROT SERPL-MCNC: 7.3 G/DL (ref 6–8.5)
RBC # BLD AUTO: 4.69 10*6/MM3 (ref 3.9–5.2)
SODIUM SERPL-SCNC: 139 MMOL/L (ref 136–145)
T4 FREE SERPL-MCNC: 0.78 NG/DL (ref 0.93–1.7)
TRIGL SERPL-MCNC: 162 MG/DL (ref 0–150)
TSH SERPL DL<=0.005 MIU/L-ACNC: 0.92 MIU/ML (ref 0.27–4.2)
VLDLC SERPL CALC-MCNC: 32.4 MG/DL (ref 5–40)
WBC # BLD AUTO: 16.8 10*3/MM3 (ref 4.5–10.7)

## 2018-05-24 ENCOUNTER — OFFICE VISIT (OUTPATIENT)
Dept: FAMILY MEDICINE CLINIC | Facility: CLINIC | Age: 78
End: 2018-05-24

## 2018-05-24 VITALS
HEIGHT: 65 IN | RESPIRATION RATE: 20 BRPM | TEMPERATURE: 99.1 F | DIASTOLIC BLOOD PRESSURE: 79 MMHG | OXYGEN SATURATION: 93 % | HEART RATE: 62 BPM | SYSTOLIC BLOOD PRESSURE: 142 MMHG | BODY MASS INDEX: 29.16 KG/M2 | WEIGHT: 175 LBS

## 2018-05-24 DIAGNOSIS — G62.9 NEUROPATHY: ICD-10-CM

## 2018-05-24 DIAGNOSIS — M51.36 LUMBAR DEGENERATIVE DISC DISEASE: ICD-10-CM

## 2018-05-24 DIAGNOSIS — R79.89 ABNORMAL CBC: ICD-10-CM

## 2018-05-24 DIAGNOSIS — I10 BENIGN ESSENTIAL HYPERTENSION: Primary | ICD-10-CM

## 2018-05-24 DIAGNOSIS — E03.9 PRIMARY HYPOTHYROIDISM: ICD-10-CM

## 2018-05-24 PROCEDURE — 99214 OFFICE O/P EST MOD 30 MIN: CPT | Performed by: FAMILY MEDICINE

## 2018-05-24 RX ORDER — AMLODIPINE AND VALSARTAN 5; 320 MG/1; MG/1
1 TABLET ORAL EVERY MORNING
Qty: 90 TABLET | Refills: 1 | Status: SHIPPED | OUTPATIENT
Start: 2018-05-24 | End: 2018-10-24

## 2018-05-24 RX ORDER — HYDROCHLOROTHIAZIDE 12.5 MG/1
12.5 TABLET ORAL DAILY
Qty: 90 TABLET | Refills: 1 | Status: SHIPPED | OUTPATIENT
Start: 2018-05-24 | End: 2018-10-24 | Stop reason: SDUPTHER

## 2018-05-24 RX ORDER — DULOXETIN HYDROCHLORIDE 60 MG/1
60 CAPSULE, DELAYED RELEASE ORAL DAILY
Qty: 90 CAPSULE | Refills: 1 | Status: SHIPPED | OUTPATIENT
Start: 2018-05-24 | End: 2018-10-24 | Stop reason: SDUPTHER

## 2018-05-24 RX ORDER — LEVOTHYROXINE SODIUM 0.15 MG/1
150 TABLET ORAL DAILY
Qty: 90 TABLET | Refills: 1 | Status: SHIPPED | OUTPATIENT
Start: 2018-05-24 | End: 2018-10-24 | Stop reason: SDUPTHER

## 2018-05-24 RX ORDER — BISOPROLOL FUMARATE 5 MG/1
5 TABLET, FILM COATED ORAL
Qty: 90 TABLET | Refills: 1 | Status: SHIPPED | OUTPATIENT
Start: 2018-05-24 | End: 2018-10-24 | Stop reason: SDUPTHER

## 2018-05-24 NOTE — PROGRESS NOTES
Subjective   Halie Guidry is a 77 y.o. female.     History of Present Illness     Chief Complaint:   Chief Complaint   Patient presents with   • Hypertension   • Neuropathy   • Lumbar DDD   • Hypothyroidism       Halie Guidry 77 y.o. female who presents today for Medical Management of the below listed issues and medication refills.  she has a problem list of   Patient Active Problem List   Diagnosis   • Compression fracture   • Lumbar degenerative disc disease   • Diabetes mellitus, type 2   • Hyperlipidemia   • Benign essential hypertension   • Primary hypothyroidism   • Leukocytosis   • Neuropathy   • Osteoporosis   • Proteinuria   • Tobacco abuse   • Diabetic eye exam   • Generalized weakness   • Spinal stenosis   • Scoliosis   • Arthritis   • VBI (vertebrobasilar insufficiency)   • Orthostatic hypotension   • 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   • Severe hypothyroidism   • Noncompliance   • Vitamin D deficiency disease   • Altered mental status   • Tremor   .  Since the last visit, she has has been seeing endocrinology for her DM and thyroid.  she has been compliant with   Current Outpatient Prescriptions:   •  acetaminophen (TYLENOL) 500 MG tablet, Take 500 mg by mouth Every 6 (Six) Hours As Needed for Mild Pain ., Disp: , Rfl:   •  amLODIPine-valsartan (EXFORGE) 5-320 MG per tablet, Take 1 tablet by mouth Every Morning., Disp: 90 tablet, Rfl: 1  •  amoxicillin-clavulanate (AUGMENTIN) 875-125 MG per tablet, Take 1 tablet by mouth 2 (Two) Times a Day., Disp: , Rfl:   •  atorvastatin (LIPITOR) 40 MG tablet, Take 1 tablet by mouth Every Night., Disp: , Rfl:   •  bisoprolol (ZEBeta) 5 MG tablet, Take 1 tablet by mouth Daily., Disp: 90 tablet, Rfl: 1  •  cetirizine (zyrTEC) 10 MG tablet, Take 10 mg by mouth Daily., Disp: , Rfl:   •  DULoxetine (CYMBALTA) 60 MG capsule, Take 1 capsule by mouth Daily., Disp: 90 capsule, Rfl: 1  •  fluticasone (FLONASE) 50 MCG/ACT nasal spray, 2  "sprays into each nostril Daily., Disp: , Rfl:   •  guaiFENesin (MUCINEX) 600 MG 12 hr tablet, Take 1,200 mg by mouth 2 (Two) Times a Day., Disp: , Rfl:   •  hydrochlorothiazide (HYDRODIURIL) 12.5 MG tablet, Take 1 tablet by mouth Daily., Disp: 90 tablet, Rfl: 1  •  insulin aspart (novoLOG) 100 UNIT/ML injection, Inject 0-15 Units under the skin 4 (Four) Times a Day With Meals & at Bedtime., Disp: , Rfl: 12  •  insulin aspart (novoLOG) 100 UNIT/ML injection, Inject 9 Units under the skin 3 (Three) Times a Day With Meals., Disp: , Rfl: 12  •  insulin detemir (LEVEMIR) 100 UNIT/ML injection, Inject 60 Units under the skin Every Night., Disp: , Rfl:   •  Insulin Pen Needle 32G X 4 MM misc, Use to inject insulin, Disp: 200 each, Rfl: 11  •  lansoprazole (PREVACID) 30 MG capsule, Take 30 mg by mouth Daily., Disp: , Rfl:   •  levothyroxine (SYNTHROID, LEVOTHROID) 150 MCG tablet, Take 1 tablet by mouth Daily., Disp: , Rfl:   •  promethazine (PHENERGAN) 12.5 MG tablet, Take 12.5 mg by mouth Every 6 (Six) Hours As Needed for Nausea or Vomiting., Disp: , Rfl: .  she denies medication side effects.    All of the chronic condition(s) listed above are stable w/o issues.    /79   Pulse 62   Temp 99.1 °F (37.3 °C)   Resp 20   Ht 165.1 cm (65\")   Wt 79.4 kg (175 lb)   SpO2 93%   BMI 29.12 kg/m²     Results for orders placed or performed in visit on 05/15/18   Comprehensive metabolic panel   Result Value Ref Range    Glucose 149 (H) 65 - 99 mg/dL    BUN 20 8 - 23 mg/dL    Creatinine 0.84 0.57 - 1.00 mg/dL    eGFR Non African Am 66 >60 mL/min/1.73    eGFR African Am 80 >60 mL/min/1.73    BUN/Creatinine Ratio 23.8 7.0 - 25.0    Sodium 139 136 - 145 mmol/L    Potassium 4.6 3.5 - 5.2 mmol/L    Chloride 97 (L) 98 - 107 mmol/L    Total CO2 26.1 22.0 - 29.0 mmol/L    Calcium 10.6 (H) 8.6 - 10.5 mg/dL    Total Protein 7.3 6.0 - 8.5 g/dL    Albumin 4.20 3.50 - 5.20 g/dL    Globulin 3.1 gm/dL    A/G Ratio 1.4 g/dL    Total " Bilirubin 0.4 0.1 - 1.2 mg/dL    Alkaline Phosphatase 65 39 - 117 U/L    AST (SGOT) 18 1 - 32 U/L    ALT (SGPT) 16 1 - 33 U/L   Lipid Panel With LDL/HDL Ratio   Result Value Ref Range    Total Cholesterol 125 0 - 200 mg/dL    Triglycerides 162 (H) 0 - 150 mg/dL    HDL Cholesterol 32 (L) 40 - 60 mg/dL    VLDL Cholesterol 32.4 5 - 40 mg/dL    LDL Cholesterol  61 0 - 100 mg/dL    LDL/HDL Ratio 1.89    TSH   Result Value Ref Range    TSH 0.915 0.270 - 4.200 mIU/mL   T4, Free   Result Value Ref Range    Free T4 0.78 (L) 0.93 - 1.70 ng/dL   CBC and Differential   Result Value Ref Range    WBC 16.80 (H) 4.50 - 10.70 10*3/mm3    RBC 4.69 3.90 - 5.20 10*6/mm3    Hemoglobin 13.7 11.9 - 15.5 g/dL    Hematocrit 43.4 35.6 - 45.5 %    MCV 92.5 80.5 - 98.2 fL    MCH 29.2 26.9 - 32.0 pg    MCHC 31.6 (L) 32.4 - 36.3 g/dL    RDW 14.6 (H) 11.7 - 13.0 %    Platelets 459 140 - 500 10*3/mm3    Neutrophil Rel % 74.8 42.7 - 76.0 %    Lymphocyte Rel % 15.2 (L) 19.6 - 45.3 %    Monocyte Rel % 5.0 5.0 - 12.0 %    Eosinophil Rel % 4.3 0.3 - 6.2 %    Basophil Rel % 0.5 0.0 - 1.5 %    Neutrophils Absolute 12.55 (H) 1.90 - 8.10 10*3/mm3    Lymphocytes Absolute 2.56 0.90 - 4.80 10*3/mm3    Monocytes Absolute 0.84 0.20 - 1.20 10*3/mm3    Eosinophils Absolute 0.73 (H) 0.00 - 0.70 10*3/mm3    Basophils Absolute 0.08 0.00 - 0.20 10*3/mm3    Immature Granulocyte Rel % 0.2 0.0 - 0.5 %    Immature Grans Absolute 0.04 (H) 0.00 - 0.03 10*3/mm3           The following portions of the patient's history were reviewed and updated as appropriate: allergies, current medications, past family history, past medical history, past social history, past surgical history and problem list.    Review of Systems   Constitutional: Negative for activity change, chills, fatigue and fever.   Respiratory: Negative for cough and chest tightness.    Cardiovascular: Negative for chest pain and palpitations.   Gastrointestinal: Negative for abdominal pain and nausea.   Endocrine:  Negative for cold intolerance.   Psychiatric/Behavioral: Negative for behavioral problems and dysphoric mood.       Objective   Physical Exam   Constitutional: She appears well-developed and well-nourished.   Neck: Neck supple. No thyromegaly present.   Cardiovascular: Normal rate and regular rhythm.    No murmur heard.  Pulmonary/Chest: Effort normal and breath sounds normal.   Abdominal: Bowel sounds are normal.   Psychiatric: She has a normal mood and affect. Her behavior is normal.   Nursing note and vitals reviewed.  Labs reviewed with pt today during visit. All questions answered.    Assessment/Plan   Halie was seen today for hypertension, neuropathy, lumbar ddd and hypothyroidism.    Diagnoses and all orders for this visit:    Benign essential hypertension  -     hydrochlorothiazide (HYDRODIURIL) 12.5 MG tablet; Take 1 tablet by mouth Daily.  -     bisoprolol (ZEBeta) 5 MG tablet; Take 1 tablet by mouth Daily.  -     amLODIPine-valsartan (EXFORGE) 5-320 MG per tablet; Take 1 tablet by mouth Every Morning.    Neuropathy  -     DULoxetine (CYMBALTA) 60 MG capsule; Take 1 capsule by mouth Daily.    Lumbar degenerative disc disease  -     DULoxetine (CYMBALTA) 60 MG capsule; Take 1 capsule by mouth Daily.    Abnormal CBC  -     CBC & Differential

## 2018-05-25 LAB
BASOPHILS # BLD AUTO: 0.07 10*3/MM3 (ref 0–0.2)
BASOPHILS NFR BLD AUTO: 0.5 % (ref 0–1.5)
EOSINOPHIL # BLD AUTO: 0.51 10*3/MM3 (ref 0–0.7)
EOSINOPHIL NFR BLD AUTO: 4 % (ref 0.3–6.2)
ERYTHROCYTE [DISTWIDTH] IN BLOOD BY AUTOMATED COUNT: 14.8 % (ref 11.7–13)
HCT VFR BLD AUTO: 42.6 % (ref 35.6–45.5)
HGB BLD-MCNC: 13.1 G/DL (ref 11.9–15.5)
IMM GRANULOCYTES # BLD: 0.03 10*3/MM3 (ref 0–0.03)
IMM GRANULOCYTES NFR BLD: 0.2 % (ref 0–0.5)
LYMPHOCYTES # BLD AUTO: 2.82 10*3/MM3 (ref 0.9–4.8)
LYMPHOCYTES NFR BLD AUTO: 21.9 % (ref 19.6–45.3)
MCH RBC QN AUTO: 28.7 PG (ref 26.9–32)
MCHC RBC AUTO-ENTMCNC: 30.8 G/DL (ref 32.4–36.3)
MCV RBC AUTO: 93.4 FL (ref 80.5–98.2)
MONOCYTES # BLD AUTO: 0.71 10*3/MM3 (ref 0.2–1.2)
MONOCYTES NFR BLD AUTO: 5.5 % (ref 5–12)
NEUTROPHILS # BLD AUTO: 8.75 10*3/MM3 (ref 1.9–8.1)
NEUTROPHILS NFR BLD AUTO: 67.9 % (ref 42.7–76)
PLATELET # BLD AUTO: 475 10*3/MM3 (ref 140–500)
RBC # BLD AUTO: 4.56 10*6/MM3 (ref 3.9–5.2)
WBC # BLD AUTO: 12.89 10*3/MM3 (ref 4.5–10.7)

## 2018-05-29 DIAGNOSIS — D72.829 LEUKOCYTOSIS, UNSPECIFIED TYPE: Primary | ICD-10-CM

## 2018-07-10 ENCOUNTER — RESULTS ENCOUNTER (OUTPATIENT)
Dept: FAMILY MEDICINE CLINIC | Facility: CLINIC | Age: 78
End: 2018-07-10

## 2018-07-10 DIAGNOSIS — D72.829 LEUKOCYTOSIS, UNSPECIFIED TYPE: ICD-10-CM

## 2018-08-13 RX ORDER — INSULIN DETEMIR 100 [IU]/ML
50 INJECTION, SOLUTION SUBCUTANEOUS NIGHTLY
Qty: 45 ML | Refills: 1 | OUTPATIENT
Start: 2018-08-13

## 2018-09-20 NOTE — TELEPHONE ENCOUNTER
SENT HUMALOG TO PATIENT PHARMACY AT PATIENT REQUEST NOVOLOG WAS COSTING PATIENT 500.00          ----- Message from Rossy Tang MA sent at 9/20/2018  3:02 PM EDT -----  Pt is wanting humalog instead of novolog. It was going to cost 500 for novolog.

## 2018-09-21 ENCOUNTER — TELEPHONE (OUTPATIENT)
Dept: ENDOCRINOLOGY | Age: 78
End: 2018-09-21

## 2018-09-21 NOTE — TELEPHONE ENCOUNTER
Spoke with pharmacy to clarify medication direction          ----- Message from Mary Chinchilla sent at 9/21/2018 11:42 AM EDT -----  Contact: [facility]  / MISS ORTIZ   PHARMACY NEED CLARIFICATION ON A MEDICATION   MEDICATION:  Insulin Lispro (HUMALOG KWIKPEN) 100 UNIT/ML solution pen-injector   MESSAGE:  MISS ORTIZ FROM [facility] HAS CALLED IN REGARDS TO GETTING CLARIFICATION ON DIRECTIONS. THE PHARMACY  IS REQUESTING A CALL BACK IN REGARDS TO  THIS ISSUE.    PHONE NUMBER:  [phone]

## 2018-10-19 LAB
BASOPHILS # BLD AUTO: 0.07 10*3/MM3 (ref 0–0.2)
BASOPHILS NFR BLD AUTO: 0.5 % (ref 0–1.5)
EOSINOPHIL # BLD AUTO: 0.73 10*3/MM3 (ref 0–0.7)
EOSINOPHIL NFR BLD AUTO: 5.5 % (ref 0.3–6.2)
ERYTHROCYTE [DISTWIDTH] IN BLOOD BY AUTOMATED COUNT: 13.7 % (ref 11.7–13)
HCT VFR BLD AUTO: 42.5 % (ref 35.6–45.5)
HGB BLD-MCNC: 13.6 G/DL (ref 11.9–15.5)
IMM GRANULOCYTES # BLD: 0.04 10*3/MM3 (ref 0–0.03)
IMM GRANULOCYTES NFR BLD: 0.3 % (ref 0–0.5)
LYMPHOCYTES # BLD AUTO: 2.86 10*3/MM3 (ref 0.9–4.8)
LYMPHOCYTES NFR BLD AUTO: 21.4 % (ref 19.6–45.3)
MCH RBC QN AUTO: 29 PG (ref 26.9–32)
MCHC RBC AUTO-ENTMCNC: 32 G/DL (ref 32.4–36.3)
MCV RBC AUTO: 90.6 FL (ref 80.5–98.2)
MONOCYTES # BLD AUTO: 0.85 10*3/MM3 (ref 0.2–1.2)
MONOCYTES NFR BLD AUTO: 6.4 % (ref 5–12)
NEUTROPHILS # BLD AUTO: 8.86 10*3/MM3 (ref 1.9–8.1)
NEUTROPHILS NFR BLD AUTO: 66.2 % (ref 42.7–76)
PLATELET # BLD AUTO: 454 10*3/MM3 (ref 140–500)
RBC # BLD AUTO: 4.69 10*6/MM3 (ref 3.9–5.2)
WBC # BLD AUTO: 13.37 10*3/MM3 (ref 4.5–10.7)

## 2018-10-24 ENCOUNTER — OFFICE VISIT (OUTPATIENT)
Dept: FAMILY MEDICINE CLINIC | Facility: CLINIC | Age: 78
End: 2018-10-24

## 2018-10-24 VITALS
DIASTOLIC BLOOD PRESSURE: 73 MMHG | HEART RATE: 68 BPM | TEMPERATURE: 97.7 F | BODY MASS INDEX: 28.19 KG/M2 | WEIGHT: 186 LBS | HEIGHT: 68 IN | RESPIRATION RATE: 16 BRPM | SYSTOLIC BLOOD PRESSURE: 153 MMHG

## 2018-10-24 DIAGNOSIS — G62.9 NEUROPATHY: ICD-10-CM

## 2018-10-24 DIAGNOSIS — M51.36 LUMBAR DEGENERATIVE DISC DISEASE: ICD-10-CM

## 2018-10-24 DIAGNOSIS — I10 BENIGN ESSENTIAL HYPERTENSION: Primary | ICD-10-CM

## 2018-10-24 DIAGNOSIS — D72.829 LEUKOCYTOSIS, UNSPECIFIED TYPE: ICD-10-CM

## 2018-10-24 DIAGNOSIS — E03.9 PRIMARY HYPOTHYROIDISM: ICD-10-CM

## 2018-10-24 PROCEDURE — G0439 PPPS, SUBSEQ VISIT: HCPCS | Performed by: FAMILY MEDICINE

## 2018-10-24 PROCEDURE — 99214 OFFICE O/P EST MOD 30 MIN: CPT | Performed by: FAMILY MEDICINE

## 2018-10-24 RX ORDER — DULOXETIN HYDROCHLORIDE 60 MG/1
60 CAPSULE, DELAYED RELEASE ORAL DAILY
Qty: 90 CAPSULE | Refills: 1 | Status: SHIPPED | OUTPATIENT
Start: 2018-10-24 | End: 2019-05-15 | Stop reason: SDUPTHER

## 2018-10-24 RX ORDER — BISOPROLOL FUMARATE 5 MG/1
5 TABLET, FILM COATED ORAL
Qty: 90 TABLET | Refills: 1 | Status: SHIPPED | OUTPATIENT
Start: 2018-10-24 | End: 2019-05-21 | Stop reason: SDUPTHER

## 2018-10-24 RX ORDER — HYDROCHLOROTHIAZIDE 12.5 MG/1
12.5 TABLET ORAL DAILY
Qty: 90 TABLET | Refills: 1 | Status: SHIPPED | OUTPATIENT
Start: 2018-10-24 | End: 2019-05-21 | Stop reason: SDUPTHER

## 2018-10-24 RX ORDER — AMLODIPINE AND OLMESARTAN MEDOXOMIL 5; 40 MG/1; MG/1
1 TABLET ORAL DAILY
Qty: 90 TABLET | Refills: 1 | Status: SHIPPED | OUTPATIENT
Start: 2018-10-24 | End: 2018-12-10 | Stop reason: CLARIF

## 2018-10-24 RX ORDER — LEVOTHYROXINE SODIUM 0.15 MG/1
150 TABLET ORAL DAILY
Qty: 90 TABLET | Refills: 1 | Status: SHIPPED | OUTPATIENT
Start: 2018-10-24 | End: 2019-05-21 | Stop reason: SDUPTHER

## 2018-10-24 NOTE — PROGRESS NOTES
Subjective   Halie Guidry is a 78 y.o. female.     History of Present Illness     Chief Complaint:   Chief Complaint   Patient presents with   • medicare wellness   • Hypertension     med refill -  lab results    • Anxiety       Halie Guidry 78 y.o. female who presents today for Medical Management of the below listed issues and medication refills.  she has a problem list of   Patient Active Problem List   Diagnosis   • Compression fracture   • Lumbar degenerative disc disease   • Diabetes mellitus, type 2 (CMS/[facility])   • Hyperlipidemia   • Benign essential hypertension   • Primary hypothyroidism   • Leukocytosis   • Neuropathy   • Osteoporosis   • Proteinuria   • Tobacco abuse   • Diabetic eye exam (CMS/[facility])   • Generalized weakness   • Spinal stenosis   • Scoliosis   • Arthritis   • VBI (vertebrobasilar insufficiency)   • Orthostatic hypotension   • 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 (CMS/[facility])   • Severe hypothyroidism   • Noncompliance   • Vitamin D deficiency disease   • Altered mental status   • Tremor   .  Since the last visit, she has overall felt well.  she has been compliant with   Current Outpatient Prescriptions:   •  acetaminophen (TYLENOL) 500 MG tablet, Take 500 mg by mouth Every 6 (Six) Hours As Needed for Mild Pain ., Disp: , Rfl:   •  amlodipine-olmesartan (MYAH) 5-40 MG per tablet, Take 1 tablet by mouth Daily., Disp: 90 tablet, Rfl: 1  •  atorvastatin (LIPITOR) 40 MG tablet, Take 1 tablet by mouth Every Night., Disp: , Rfl:   •  bisoprolol (ZEBeta) 5 MG tablet, Take 1 tablet by mouth Daily., Disp: 90 tablet, Rfl: 1  •  cetirizine (zyrTEC) 10 MG tablet, Take 10 mg by mouth Daily., Disp: , Rfl:   •  DULoxetine (CYMBALTA) 60 MG capsule, Take 1 capsule by mouth Daily., Disp: 90 capsule, Rfl: 1  •  fluticasone (FLONASE) 50 MCG/ACT nasal spray, 2 sprays into each nostril Daily., Disp: , Rfl:   •  guaiFENesin (MUCINEX) 600 MG 12 hr tablet, Take 1,200 mg by mouth 2 (Two)  "Times a Day., Disp: , Rfl:   •  hydrochlorothiazide (HYDRODIURIL) 12.5 MG tablet, Take 1 tablet by mouth Daily., Disp: 90 tablet, Rfl: 1  •  insulin detemir (LEVEMIR) 100 UNIT/ML injection, Inject 50 Units under the skin into the appropriate area as directed Every Night., Disp: 5 pen, Rfl: 0  •  insulin detemir (LEVEMIR) 100 UNIT/ML injection, Inject 60 Units under the skin into the appropriate area as directed Every Night., Disp: 30 pen, Rfl: 3  •  Insulin Lispro (HUMALOG KWIKPEN) 100 UNIT/ML solution pen-injector, Inject 6 Units under the skin into the appropriate area as directed 4 (Three) Times a Day With Meals. Breakfast lunch dinner and snack, Disp: 10 pen, Rfl: 3  •  Insulin Pen Needle 32G X 4 MM misc, Use to inject insulin 4 TIMES DAILY, Disp: 200 each, Rfl: 0  •  lansoprazole (PREVACID) 30 MG capsule, Take 30 mg by mouth Daily., Disp: , Rfl:   •  levothyroxine (SYNTHROID, LEVOTHROID) 150 MCG tablet, Take 1 tablet by mouth Daily., Disp: 90 tablet, Rfl: 1  •  metFORMIN (GLUCOPHAGE) 1000 MG tablet, TAKE 1 TABLET TWICE A DAY WITH MEALS, Disp: 180 tablet, Rfl: 3  •  promethazine (PHENERGAN) 12.5 MG tablet, Take 12.5 mg by mouth Every 6 (Six) Hours As Needed for Nausea or Vomiting., Disp: , Rfl: .  she denies medication side effects.    She has seen [facility] prior for the chronic WBC elevation and tells me that they told her it was nothing to worry about as stable.    All of the chronic condition(s) listed above are stable w/o issues.    /73   Pulse 68   Temp 97.7 °F (36.5 °C) (Oral)   Resp 16   Ht 172.7 cm (68\")   Wt 84.4 kg (186 lb)   BMI 28.28 kg/m²     Results for orders placed or performed in visit on 07/10/18   CBC w MANUAL Differential   Result Value Ref Range    WBC 13.37 (H) 4.50 - 10.70 10*3/mm3    RBC 4.69 3.90 - 5.20 10*6/mm3    Hemoglobin 13.6 11.9 - 15.5 g/dL    Hematocrit 42.5 35.6 - 45.5 %    MCV 90.6 80.5 - 98.2 fL    MCH 29.0 26.9 - 32.0 pg    MCHC 32.0 (L) 32.4 - 36.3 g/dL    RDW " 13.7 (H) 11.7 - 13.0 %    Platelets 454 140 - 500 10*3/mm3    Neutrophil Rel % 66.2 42.7 - 76.0 %    Lymphocyte Rel % 21.4 19.6 - 45.3 %    Monocyte Rel % 6.4 5.0 - 12.0 %    Eosinophil Rel % 5.5 0.3 - 6.2 %    Basophil Rel % 0.5 0.0 - 1.5 %    Neutrophils Absolute 8.86 (H) 1.90 - 8.10 10*3/mm3    Lymphocytes Absolute 2.86 0.90 - 4.80 10*3/mm3    Monocytes Absolute 0.85 0.20 - 1.20 10*3/mm3    Eosinophils Absolute 0.73 (H) 0.00 - 0.70 10*3/mm3    Basophils Absolute 0.07 0.00 - 0.20 10*3/mm3    Immature Granulocyte Rel % 0.3 0.0 - 0.5 %    Immature Grans Absolute 0.04 (H) 0.00 - 0.03 10*3/mm3           The following portions of the patient's history were reviewed and updated as appropriate: allergies, current medications, past family history, past medical history, past social history, past surgical history and problem list.    Review of Systems   Constitutional: Negative for activity change, chills, fatigue and fever.   Respiratory: Negative for cough and chest tightness.    Cardiovascular: Negative for chest pain and palpitations.   Gastrointestinal: Negative for abdominal pain and nausea.   Endocrine: Negative for cold intolerance.   Psychiatric/Behavioral: Negative for behavioral problems and dysphoric mood.       Objective   Physical Exam   Constitutional: She appears well-developed and well-nourished.   Neck: Neck supple. No thyromegaly present.   Cardiovascular: Normal rate and regular rhythm.    No murmur heard.  Pulmonary/Chest: Effort normal and breath sounds normal.   Abdominal: Bowel sounds are normal. There is no tenderness.   Neurological: She is alert.   Psychiatric: She has a normal mood and affect. Her behavior is normal.   Nursing note and vitals reviewed.  Labs reviewed with pt today during visit. All questions answered.      Assessment/Plan   Halie was seen today for medicare wellness, hypertension and anxiety.    Diagnoses and all orders for this visit:    Benign essential hypertension  -      bisoprolol (ZEBeta) 5 MG tablet; Take 1 tablet by mouth Daily.  -     hydrochlorothiazide (HYDRODIURIL) 12.5 MG tablet; Take 1 tablet by mouth Daily.  -     amlodipine-olmesartan (MYAH) 5-40 MG per tablet; Take 1 tablet by mouth Daily.    Neuropathy  -     DULoxetine (CYMBALTA) 60 MG capsule; Take 1 capsule by mouth Daily.    Lumbar degenerative disc disease  -     DULoxetine (CYMBALTA) 60 MG capsule; Take 1 capsule by mouth Daily.    Primary hypothyroidism  -     levothyroxine (SYNTHROID, LEVOTHROID) 150 MCG tablet; Take 1 tablet by mouth Daily.    Leukocytosis, unspecified type    Other orders  -     Cancel: Ambulatory Referral to Hematology

## 2018-10-24 NOTE — PATIENT INSTRUCTIONS
Medicare Wellness  Personal Prevention Plan of Service     Date of Office Visit:  10/24/2018  Encounter Provider:  Napoleon Mead MD  Place of Service:  [facility]  Patient Name: Halie Guidry  :  1940    As part of the Medicare Wellness portion of your visit today, we are providing you with this personalized preventive plan of services (PPPS). This plan is based upon recommendations of the [place] Preventive Services Task Force (USPSTF) and the Advisory Committee on Immunization Practices (ACIP).    This lists the preventive care services that should be considered, and provides dates of when you are due. Items listed as completed are up-to-date and do not require any further intervention.    Health Maintenance   Topic Date Due   • PNEUMOCOCCAL VACCINES (65+ LOW/MEDIUM RISK) (2 of 2 - PPSV23) 2015   • HEMOGLOBIN A1C  06/15/2018   • URINE MICROALBUMIN  2018   • TDAP/TD VACCINES (1 - Tdap) 2019 (Originally 2016)   • DXA SCAN  2019 (Originally 2018)   • ZOSTER VACCINE (3 of 3) 2019 (Originally 10/8/2013)   • MAMMOGRAM  2019   • LIPID PANEL  2019   • MEDICARE ANNUAL WELLNESS  10/24/2019   • COLONOSCOPY  2025   • INFLUENZA VACCINE  Completed       No orders of the defined types were placed in this encounter.      Return in about 6 months (around 2019) for Recheck.

## 2018-10-24 NOTE — PROGRESS NOTES
QUICK REFERENCE INFORMATION:  The ABCs of the Annual Wellness Visit    Subsequent Medicare Wellness Visit    HEALTH RISK ASSESSMENT    1940    Recent Hospitalizations:  Recently treated at the following:  [facility].        Current Medical Providers:  Patient Care Team:  Napoleon Mead MD as PCP - General  Napoleon Mead MD as PCP - Family Medicine        Smoking Status:  History   Smoking Status   • Former Smoker   • Packs/day: 1.00   • Quit date: 2013   Smokeless Tobacco   • Never Used       Alcohol Consumption:  History   Alcohol Use No       Depression Screen:   PHQ-2/PHQ-9 Depression Screening 10/24/2018   Little interest or pleasure in doing things 0   Feeling down, depressed, or hopeless 0   Total Score 0       Health Habits and Functional and Cognitive Screening:  Functional & Cognitive Status 10/24/2018   Do you have difficulty preparing food and eating? No   Do you have difficulty bathing yourself, getting dressed or grooming yourself? No   Do you have difficulty using the toilet? No   Do you have difficulty moving around from place to place? No   Do you have trouble with steps or getting out of a bed or a chair? No   In the past year have you fallen or experienced a near fall? No   Current Diet Well Balanced Diet   Dental Exam Not up to date   Eye Exam Not up to date   Exercise (times per week) 0 times per week   Current Exercise Activities Include None   Do you need help using the phone?  No   Are you deaf or do you have serious difficulty hearing?  No   Do you need help with transportation? No   Do you need help shopping? No   Do you need help preparing meals?  No   Do you need help with housework?  No   Do you need help with laundry? No   Do you need help taking your medications? No   Do you need help managing money? No   Do you ever drive or ride in a car without wearing a seat belt? No   Have you felt unusual stress, anger or loneliness in the last month? No   Who do you live  with? Child   If you need help, do you have trouble finding someone available to you? No   Have you been bothered in the last four weeks by sexual problems? No   Do you have difficulty concentrating, remembering or making decisions? No           Does the patient have evidence of cognitive impairment? No    Aspirin use counseling: Contraindicated from taking ASA      Recent Lab Results:  CMP:  Lab Results   Component Value Date     (H) 05/17/2018    BUN 20 05/17/2018    CREATININE 0.84 05/17/2018    EGFRIFNONA 66 05/17/2018    EGFRIFAFRI 80 05/17/2018    BCR 23.8 05/17/2018     05/17/2018    K 4.6 05/17/2018    CO2 26.1 05/17/2018    CALCIUM 10.6 (H) 05/17/2018    PROTENTOTREF 7.3 05/17/2018    ALBUMIN 4.20 05/17/2018    LABGLOBREF 3.1 05/17/2018    LABIL2 1.4 05/17/2018    BILITOT 0.4 05/17/2018    ALKPHOS 65 05/17/2018    AST 18 05/17/2018    ALT 16 05/17/2018     Lipid Panel:  Lab Results   Component Value Date    CHOL 238 (H) 12/18/2017    TRIG 162 (H) 05/17/2018    HDL 32 (L) 05/17/2018    VLDL 32.4 05/17/2018    LDLHDL 1.89 05/17/2018     HbA1c:  Lab Results   Component Value Date    HGBA1C 11.52 (H) 12/15/2017       Visual Acuity:  No exam data present    Age-appropriate Screening Schedule:  Refer to the list below for future screening recommendations based on patient's age, sex and/or medical conditions. Orders for these recommended tests are listed in the plan section. The patient has been provided with a written plan.    Health Maintenance   Topic Date Due   • PNEUMOCOCCAL VACCINES (65+ LOW/MEDIUM RISK) (2 of 2 - PPSV23) 05/14/2015   • HEMOGLOBIN A1C  06/15/2018   • URINE MICROALBUMIN  08/01/2018   • TDAP/TD VACCINES (1 - Tdap) 01/25/2019 (Originally 7/1/2016)   • DXA SCAN  03/13/2019 (Originally 8/9/2018)   • ZOSTER VACCINE (3 of 3) 05/01/2019 (Originally 10/8/2013)   • MAMMOGRAM  01/23/2019   • LIPID PANEL  05/17/2019   • COLONOSCOPY  06/05/2025   • INFLUENZA VACCINE  Completed         Subjective   History of Present Illness    Halie Guidry is a 78 y.o. female who presents for an Subsequent Wellness Visit.    The following portions of the patient's history were reviewed and updated as appropriate: allergies, current medications, past family history, past medical history, past social history, past surgical history and problem list.    Outpatient Medications Prior to Visit   Medication Sig Dispense Refill   • bisoprolol (ZEBeta) 5 MG tablet Take 1 tablet by mouth Daily. 90 tablet 1   • DULoxetine (CYMBALTA) 60 MG capsule Take 1 capsule by mouth Daily. 90 capsule 1   • hydrochlorothiazide (HYDRODIURIL) 12.5 MG tablet Take 1 tablet by mouth Daily. 90 tablet 1   • acetaminophen (TYLENOL) 500 MG tablet Take 500 mg by mouth Every 6 (Six) Hours As Needed for Mild Pain .     • atorvastatin (LIPITOR) 40 MG tablet Take 1 tablet by mouth Every Night.     • cetirizine (zyrTEC) 10 MG tablet Take 10 mg by mouth Daily.     • fluticasone (FLONASE) 50 MCG/ACT nasal spray 2 sprays into each nostril Daily.     • guaiFENesin (MUCINEX) 600 MG 12 hr tablet Take 1,200 mg by mouth 2 (Two) Times a Day.     • insulin detemir (LEVEMIR) 100 UNIT/ML injection Inject 50 Units under the skin into the appropriate area as directed Every Night. 5 pen 0   • insulin detemir (LEVEMIR) 100 UNIT/ML injection Inject 60 Units under the skin into the appropriate area as directed Every Night. 30 pen 3   • Insulin Lispro (HUMALOG KWIKPEN) 100 UNIT/ML solution pen-injector Inject 6 Units under the skin into the appropriate area as directed 4 (Three) Times a Day With Meals. Breakfast lunch dinner and snack 10 pen 3   • Insulin Pen Needle 32G X 4 MM misc Use to inject insulin 4 TIMES DAILY 200 each 0   • lansoprazole (PREVACID) 30 MG capsule Take 30 mg by mouth Daily.     • metFORMIN (GLUCOPHAGE) 1000 MG tablet TAKE 1 TABLET TWICE A DAY WITH MEALS 180 tablet 3   • promethazine (PHENERGAN) 12.5 MG tablet Take 12.5 mg by mouth Every 6  "(Six) Hours As Needed for Nausea or Vomiting.     • amLODIPine-valsartan (EXFORGE) 5-320 MG per tablet Take 1 tablet by mouth Every Morning. 90 tablet 1   • amoxicillin-clavulanate (AUGMENTIN) 875-125 MG per tablet Take 1 tablet by mouth 2 (Two) Times a Day.     • levothyroxine (SYNTHROID, LEVOTHROID) 150 MCG tablet Take 1 tablet by mouth Daily. 90 tablet 1     No facility-administered medications prior to visit.        Patient Active Problem List   Diagnosis   • Compression fracture   • Lumbar degenerative disc disease   • Diabetes mellitus, type 2 (CMS/HCC)   • Hyperlipidemia   • Benign essential hypertension   • Primary hypothyroidism   • Leukocytosis   • Neuropathy   • Osteoporosis   • Proteinuria   • Tobacco abuse   • Diabetic eye exam (CMS/[facility])   • Generalized weakness   • Spinal stenosis   • Scoliosis   • Arthritis   • VBI (vertebrobasilar insufficiency)   • Orthostatic hypotension   • 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 (CMS/[facility])   • Severe hypothyroidism   • Noncompliance   • Vitamin D deficiency disease   • Altered mental status   • Tremor       Advance Care Planning:  has an advance directive - a copy HAS NOT been provided    Identification of Risk Factors:  Risk factors include: weight  and cardiovascular risk.    Review of Systems    Compared to one year ago, the patient feels her physical health is the same.  Compared to one year ago, the patient feels her mental health is the same.    Objective     Physical Exam    Vitals:    10/24/18 1301   BP: 153/73   Pulse: 68   Resp: 16   Temp: 97.7 °F (36.5 °C)   TempSrc: Oral   Weight: 84.4 kg (186 lb)   Height: 172.7 cm (68\")   PainSc: 0-No pain       Patient's Body mass index is 28.28 kg/m². BMI is within normal parameters. No follow-up required.      Assessment/Plan   Patient Self-Management and Personalized Health Advice  The patient has been provided with information about: diet, exercise and weight management and preventive services " including:   · Exercise counseling provided, Fall Risk assessment done, Nutrition counseling provided.    Visit Diagnoses:    ICD-10-CM ICD-9-CM   1. Benign essential hypertension I10 401.1   2. Neuropathy G62.9 355.9   3. Lumbar degenerative disc disease M51.36 722.52   4. Primary hypothyroidism E03.9 244.9   5. Leukocytosis, unspecified type D72.829 288.60       No orders of the defined types were placed in this encounter.      Outpatient Encounter Prescriptions as of 10/24/2018   Medication Sig Dispense Refill   • [DISCONTINUED] bisoprolol (ZEBeta) 5 MG tablet Take 1 tablet by mouth Daily. 90 tablet 1   • [DISCONTINUED] DULoxetine (CYMBALTA) 60 MG capsule Take 1 capsule by mouth Daily. 90 capsule 1   • [DISCONTINUED] hydrochlorothiazide (HYDRODIURIL) 12.5 MG tablet Take 1 tablet by mouth Daily. 90 tablet 1   • acetaminophen (TYLENOL) 500 MG tablet Take 500 mg by mouth Every 6 (Six) Hours As Needed for Mild Pain .     • amlodipine-olmesartan (MYAH) 5-40 MG per tablet Take 1 tablet by mouth Daily. 90 tablet 1   • atorvastatin (LIPITOR) 40 MG tablet Take 1 tablet by mouth Every Night.     • bisoprolol (ZEBeta) 5 MG tablet Take 1 tablet by mouth Daily. 90 tablet 1   • cetirizine (zyrTEC) 10 MG tablet Take 10 mg by mouth Daily.     • DULoxetine (CYMBALTA) 60 MG capsule Take 1 capsule by mouth Daily. 90 capsule 1   • fluticasone (FLONASE) 50 MCG/ACT nasal spray 2 sprays into each nostril Daily.     • guaiFENesin (MUCINEX) 600 MG 12 hr tablet Take 1,200 mg by mouth 2 (Two) Times a Day.     • hydrochlorothiazide (HYDRODIURIL) 12.5 MG tablet Take 1 tablet by mouth Daily. 90 tablet 1   • insulin detemir (LEVEMIR) 100 UNIT/ML injection Inject 50 Units under the skin into the appropriate area as directed Every Night. 5 pen 0   • insulin detemir (LEVEMIR) 100 UNIT/ML injection Inject 60 Units under the skin into the appropriate area as directed Every Night. 30 pen 3   • Insulin Lispro (HUMALOG KWIKPEN) 100 UNIT/ML solution  pen-injector Inject 6 Units under the skin into the appropriate area as directed 4 (Three) Times a Day With Meals. Breakfast lunch dinner and snack 10 pen 3   • Insulin Pen Needle 32G X 4 MM misc Use to inject insulin 4 TIMES DAILY 200 each 0   • lansoprazole (PREVACID) 30 MG capsule Take 30 mg by mouth Daily.     • levothyroxine (SYNTHROID, LEVOTHROID) 150 MCG tablet Take 1 tablet by mouth Daily. 90 tablet 1   • metFORMIN (GLUCOPHAGE) 1000 MG tablet TAKE 1 TABLET TWICE A DAY WITH MEALS 180 tablet 3   • promethazine (PHENERGAN) 12.5 MG tablet Take 12.5 mg by mouth Every 6 (Six) Hours As Needed for Nausea or Vomiting.     • [DISCONTINUED] amLODIPine-valsartan (EXFORGE) 5-320 MG per tablet Take 1 tablet by mouth Every Morning. 90 tablet 1   • [DISCONTINUED] amoxicillin-clavulanate (AUGMENTIN) 875-125 MG per tablet Take 1 tablet by mouth 2 (Two) Times a Day.     • [DISCONTINUED] levothyroxine (SYNTHROID, LEVOTHROID) 150 MCG tablet Take 1 tablet by mouth Daily. 90 tablet 1     No facility-administered encounter medications on file as of 10/24/2018.        Reviewed use of high risk medication in the elderly: not applicable  Reviewed for potential of harmful drug interactions in the elderly: not applicable    Follow Up:  No Follow-up on file.     An After Visit Summary and PPPS with all of these plans were given to the patient.

## 2018-12-03 ENCOUNTER — RESULTS ENCOUNTER (OUTPATIENT)
Dept: ENDOCRINOLOGY | Age: 78
End: 2018-12-03

## 2018-12-03 DIAGNOSIS — E03.9 HYPOTHYROIDISM, UNSPECIFIED TYPE: ICD-10-CM

## 2018-12-03 DIAGNOSIS — E13.8 DIABETES MELLITUS OF OTHER TYPE WITH COMPLICATION, UNSPECIFIED WHETHER LONG TERM INSULIN USE: Primary | ICD-10-CM

## 2018-12-03 DIAGNOSIS — E13.8 DIABETES MELLITUS OF OTHER TYPE WITH COMPLICATION, UNSPECIFIED WHETHER LONG TERM INSULIN USE: ICD-10-CM

## 2018-12-06 LAB
BUN SERPL-MCNC: 18 MG/DL (ref 8–23)
BUN/CREAT SERPL: 18.8 (ref 7–25)
CALCIUM SERPL-MCNC: 11.8 MG/DL (ref 8.6–10.5)
CHLORIDE SERPL-SCNC: 96 MMOL/L (ref 98–107)
CO2 SERPL-SCNC: 26.5 MMOL/L (ref 22–29)
CREAT SERPL-MCNC: 0.96 MG/DL (ref 0.57–1)
GLUCOSE SERPL-MCNC: 290 MG/DL (ref 65–99)
HBA1C MFR BLD: 11.75 % (ref 4.8–5.6)
POTASSIUM SERPL-SCNC: 4.4 MMOL/L (ref 3.5–5.2)
SODIUM SERPL-SCNC: 139 MMOL/L (ref 136–145)
T4 FREE SERPL-MCNC: 1.6 NG/DL (ref 0.93–1.7)
TSH SERPL DL<=0.005 MIU/L-ACNC: 0.44 MIU/ML (ref 0.27–4.2)

## 2018-12-10 ENCOUNTER — OFFICE VISIT (OUTPATIENT)
Dept: ENDOCRINOLOGY | Age: 78
End: 2018-12-10

## 2018-12-10 VITALS
DIASTOLIC BLOOD PRESSURE: 64 MMHG | HEIGHT: 68 IN | SYSTOLIC BLOOD PRESSURE: 130 MMHG | BODY MASS INDEX: 28.34 KG/M2 | WEIGHT: 187 LBS | HEART RATE: 74 BPM | OXYGEN SATURATION: 96 %

## 2018-12-10 DIAGNOSIS — E03.9 PRIMARY HYPOTHYROIDISM: ICD-10-CM

## 2018-12-10 DIAGNOSIS — Z79.4 TYPE 2 DIABETES MELLITUS WITH OTHER CIRCULATORY COMPLICATION, WITH LONG-TERM CURRENT USE OF INSULIN (HCC): ICD-10-CM

## 2018-12-10 DIAGNOSIS — IMO0002 DIABETES MELLITUS TYPE 2, UNCONTROLLED, WITH COMPLICATIONS: Primary | ICD-10-CM

## 2018-12-10 DIAGNOSIS — E11.59 TYPE 2 DIABETES MELLITUS WITH OTHER CIRCULATORY COMPLICATION, WITH LONG-TERM CURRENT USE OF INSULIN (HCC): ICD-10-CM

## 2018-12-10 PROCEDURE — 99214 OFFICE O/P EST MOD 30 MIN: CPT | Performed by: INTERNAL MEDICINE

## 2018-12-10 RX ORDER — FLASH GLUCOSE SCANNING READER
1 EACH MISCELLANEOUS AS NEEDED
Qty: 1 DEVICE | Refills: 0 | Status: SHIPPED | OUTPATIENT
Start: 2018-12-10 | End: 2019-02-14

## 2018-12-10 RX ORDER — AMLODIPINE AND VALSARTAN 5; 320 MG/1; MG/1
TABLET ORAL
COMMUNITY
Start: 2016-06-30 | End: 2019-02-14

## 2018-12-10 RX ORDER — FLASH GLUCOSE SENSOR
1 KIT MISCELLANEOUS
Qty: 2 EACH | Refills: 3 | Status: SHIPPED | OUTPATIENT
Start: 2018-12-10 | End: 2019-02-14

## 2018-12-10 NOTE — PROGRESS NOTES
78 y.o.    Patient Care Team:  Napoleon Mead MD as PCP - General  Napoleon Mead MD as PCP - Family Medicine    Chief Complaint:    FOLLOW UP APPOINTMENT/ TYPE 2 DIABETES MELLITUS / HYPOTHYROIDISM  Subjective     HPI    78-year-old white female is here as a follow up for the management of type 2 diabetes mellitus.  Patient failed to follow-up since November 2017.  In the interim she was hospitalized in December 2017 with an episode of elevated blood sugar-600 mg/dL range at which point she also sustained a seizure episode.  In one of her prior hospitalizations her TSH level was greater than 100.  She does have a history of noncompliance with her medications.    Type 2 diabetes mellitus  Diagnosed more than 10 years ago, today in the clinic she reports that she is on levemir 50 units at bedtime, Humalog 6 units with each meal.  Metformin thousand milligrams oral daily.  Hospital in checking her blood sugars 3 times a day.  Most of her blood sugars are running in 200s range.  Did not get her log book or glucometer for me to review.  Does complain of occasional increased urination and thirst.  Last eye examination was 2 years ago, no history of diabetic retinopathy.  Does have tingling and numbness in her bilateral feet, no ulcers or amputations of her toes.  No prior history of CAD, CK D, CVA    Hyperlipidemia  On Lipitor 40 mg oral daily    Hypothyroidism  On levothyroxin 150 µg oral daily.    Reviewed primary care physician's/consulting physician documentation and lab results :     Interval History      The following portions of the patient's history were reviewed and updated as appropriate: allergies, current medications, past family history, past medical history, past social history, past surgical history and problem list.    Past Medical History:   Diagnosis Date   • Anxiety    • Arthritis    • Benign essential hypertension 08/20/2014   • 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CMS/[facility])    • Depression    • Diabetes (CMS/[facility])     • Diabetes mellitus, type 2 (CMS/HCC)    • Disc degeneration, lumbar    • Headache, tension-type    • Hyperlipidemia    • Hypothyroidism    • Neuropathy    • Osteoporosis 2015   • Peripheral neuropathy    • Rotator cuff tear, left    • Scoliosis    • Shoulder pain     LEFT, TORN ROTATOR CUFF S/P FALL   • Spinal stenosis      Family History   Problem Relation Age of Onset   • Bone cancer Mother    • No Known Problems Father      Social History     Socioeconomic History   • Marital status:      Spouse name: Not on file   • Number of children: Not on file   • Years of education: Not on file   • Highest education level: Not on file   Social Needs   • Financial resource strain: Not on file   • Food insecurity - worry: Not on file   • Food insecurity - inability: Not on file   • Transportation needs - medical: Not on file   • Transportation needs - non-medical: Not on file   Occupational History   • Not on file   Tobacco Use   • Smoking status: Former Smoker     Packs/day: 1.00     Last attempt to quit:      Years since quittin.9   • Smokeless tobacco: Never Used   Substance and Sexual Activity   • Alcohol use: No   • Drug use: No   • Sexual activity: Defer   Other Topics Concern   • Not on file   Social History Narrative   • Not on file     Allergies   Allergen Reactions   • Sulfa Antibiotics        Current Outpatient Medications:   •  amLODIPine-valsartan (EXFORGE) 5-320 MG per tablet, Take  by mouth., Disp: , Rfl:   •  atorvastatin (LIPITOR) 40 MG tablet, Take 1 tablet by mouth Every Night., Disp: , Rfl:   •  DULoxetine (CYMBALTA) 60 MG capsule, Take 1 capsule by mouth Daily., Disp: 90 capsule, Rfl: 1  •  hydrochlorothiazide (HYDRODIURIL) 12.5 MG tablet, Take 1 tablet by mouth Daily., Disp: 90 tablet, Rfl: 1  •  insulin detemir (LEVEMIR) 100 UNIT/ML injection, Inject 50 Units under the skin into the appropriate area as directed Every Night., Disp: 5 pen, Rfl: 0  •  Insulin Lispro (HUMALOG  KWIKPEN) 100 UNIT/ML solution pen-injector, Inject 6 Units under the skin into the appropriate area as directed 4 (Three) Times a Day With Meals. Breakfast lunch dinner and snack, Disp: 10 pen, Rfl: 3  •  Insulin Pen Needle 32G X 4 MM misc, Use to inject insulin 4 TIMES DAILY, Disp: 200 each, Rfl: 0  •  levothyroxine (SYNTHROID, LEVOTHROID) 150 MCG tablet, Take 1 tablet by mouth Daily., Disp: 90 tablet, Rfl: 1  •  metFORMIN (GLUCOPHAGE) 1000 MG tablet, TAKE 1 TABLET TWICE A DAY WITH MEALS, Disp: 180 tablet, Rfl: 3  •  acetaminophen (TYLENOL) 500 MG tablet, Take 500 mg by mouth Every 6 (Six) Hours As Needed for Mild Pain ., Disp: , Rfl:   •  bisoprolol (ZEBeta) 5 MG tablet, Take 1 tablet by mouth Daily., Disp: 90 tablet, Rfl: 1  •  cetirizine (zyrTEC) 10 MG tablet, Take 10 mg by mouth Daily., Disp: , Rfl:   •  Continuous Blood Gluc  (FREESTYLE DAYAMI 14 DAY READER) device, 1 Device As Needed (used to check BG 4 times a day)., Disp: 1 Device, Rfl: 0  •  Continuous Blood Gluc Sensor (FREESTYLE DAYAMI SENSOR SYSTEM), 1 Device Every 14 (Fourteen) Days., Disp: 2 each, Rfl: 3  •  fluticasone (FLONASE) 50 MCG/ACT nasal spray, 2 sprays into each nostril Daily., Disp: , Rfl:   •  guaiFENesin (MUCINEX) 600 MG 12 hr tablet, Take 1,200 mg by mouth 2 (Two) Times a Day., Disp: , Rfl:   •  lansoprazole (PREVACID) 30 MG capsule, Take 30 mg by mouth Daily., Disp: , Rfl:   •  Liraglutide (VICTOZA) 18 MG/3ML solution pen-injector injection, Inject 1.8 mg under the skin into the appropriate area as directed Daily., Disp: 5 pen, Rfl: 3  •  promethazine (PHENERGAN) 12.5 MG tablet, Take 12.5 mg by mouth Every 6 (Six) Hours As Needed for Nausea or Vomiting., Disp: , Rfl:         Review of Systems   Constitutional: Positive for appetite change and fatigue. Negative for diaphoresis and fever.   Eyes: Negative for visual disturbance.   Respiratory: Negative for shortness of breath.    Cardiovascular: Positive for leg swelling. Negative  "for palpitations.   Gastrointestinal: Negative for abdominal pain and vomiting.   Endocrine: Positive for polydipsia. Negative for polyuria.   Musculoskeletal: Negative for joint swelling and neck pain.   Skin: Negative for rash.   Neurological: Negative for weakness and numbness.   Psychiatric/Behavioral: Negative for behavioral problems.       Objective       Vitals:    12/10/18 1321   BP: 130/64   Pulse: 74   SpO2: 96%   Weight: 84.8 kg (187 lb)   Height: 172.7 cm (68\")     Body mass index is 28.43 kg/m².      Physical Exam  Results Review:     I reviewed the patient's new clinical results and mentioned them above in HPI and in plan as well.    Medical records reviewed  Summary: done      Results Encounter on 12/03/2018   Component Date Value Ref Range Status   • Free T4 12/05/2018 1.60  0.93 - 1.70 ng/dL Final   • TSH 12/05/2018 0.439  0.270 - 4.200 mIU/mL Final   • Hemoglobin A1C 12/05/2018 11.75* 4.80 - 5.60 % Final    Comment: Hemoglobin A1C Ranges:  Increased Risk for Diabetes  5.7% to 6.4%  Diabetes                     >= 6.5%  Diabetic Goal                < 7.0%     • Glucose 12/05/2018 290* 65 - 99 mg/dL Final   • BUN 12/05/2018 18  8 - 23 mg/dL Final   • Creatinine 12/05/2018 0.96  0.57 - 1.00 mg/dL Final   • eGFR Non  Am 12/05/2018 56* >60 mL/min/1.73 Final    Comment: The MDRD GFR formula is only valid for adults with stable  renal function between ages 18 and 70.     • eGFR  Am 12/05/2018 68  >60 mL/min/1.73 Final   • BUN/Creatinine Ratio 12/05/2018 18.8  7.0 - 25.0 Final   • Sodium 12/05/2018 139  136 - 145 mmol/L Final   • Potassium 12/05/2018 4.4  3.5 - 5.2 mmol/L Final   • Chloride 12/05/2018 96* 98 - 107 mmol/L Final   • Total CO2 12/05/2018 26.5  22.0 - 29.0 mmol/L Final   • Calcium 12/05/2018 11.8* 8.6 - 10.5 mg/dL Final     Lab Results   Component Value Date    HGBA1C 11.75 (H) 12/05/2018    HGBA1C 11.52 (H) 12/15/2017    HGBA1C 10.16 (H) 10/31/2017     Lab Results   Component " "Value Date    MICROALBUR 191.0 08/01/2017    CREATININE 0.96 12/05/2018     Imaging Results (most recent)     None                Assessment and Plan:    Halie was seen today for diabetes and hypothyroidism.    Diagnoses and all orders for this visit:    Diabetes mellitus type 2, uncontrolled, with complications (CMS/[facility])    Primary hypothyroidism    Type 2 diabetes mellitus with other circulatory complication, with long-term current use of insulin (CMS/[facility])    Other orders  -     Insulin Pen Needle 32G X 4 MM misc; Use to inject insulin 4 TIMES DAILY  -     Liraglutide (VICTOZA) 18 MG/3ML solution pen-injector injection; Inject 1.8 mg under the skin into the appropriate area as directed Daily.  -     Continuous Blood Gluc  (FREESTYLE DAYAMI 14 DAY READER) device; 1 Device As Needed (used to check BG 4 times a day).  -     Continuous Blood Gluc Sensor (FREESTYLE DAYAMI SENSOR SYSTEM); 1 Device Every 14 (Fourteen) Days.      Type 2 diabetes mellitus-severely uncontrolled  MqD6c-13.5%  Strong history of noncompliance.  Will increase levemir to 16 units at bedtime  Will increase Humalog to 9 units with each meal  Will cover with moderate dose sliding scale 3 times a day before meals only  Prescribed the patient continues glucose monitoring.  Will start Victoza subcutaneous daily  No prior history of pancreatitis.    Hypothyroidism  Continue levothyroxine 150 µg oral daily    Hyperlipidemia  Continue Lipitor 40 mg oral daily.    Reviewed Lab results with the patient.       Solomon Barrow MD  12/10/18    [facility] Dragon / transcription disclaimer:     \"Dictated utilizing Dragon dictation\".  "

## 2019-02-12 DIAGNOSIS — E78.2 MIXED HYPERLIPIDEMIA: ICD-10-CM

## 2019-02-12 RX ORDER — ATORVASTATIN CALCIUM 80 MG/1
TABLET, FILM COATED ORAL
Qty: 90 TABLET | Refills: 3 | OUTPATIENT
Start: 2019-02-12

## 2019-02-14 ENCOUNTER — OFFICE VISIT (OUTPATIENT)
Dept: ENDOCRINOLOGY | Age: 79
End: 2019-02-14

## 2019-02-14 VITALS
SYSTOLIC BLOOD PRESSURE: 130 MMHG | HEIGHT: 68 IN | BODY MASS INDEX: 28.28 KG/M2 | WEIGHT: 186.6 LBS | DIASTOLIC BLOOD PRESSURE: 80 MMHG | HEART RATE: 73 BPM

## 2019-02-14 DIAGNOSIS — IMO0002 DIABETES MELLITUS TYPE 2, UNCONTROLLED, WITH COMPLICATIONS: Primary | ICD-10-CM

## 2019-02-14 DIAGNOSIS — E03.9 PRIMARY HYPOTHYROIDISM: ICD-10-CM

## 2019-02-14 PROCEDURE — 99214 OFFICE O/P EST MOD 30 MIN: CPT | Performed by: INTERNAL MEDICINE

## 2019-02-14 RX ORDER — ZOSTER VACCINE RECOMBINANT, ADJUVANTED 50 MCG/0.5
KIT INTRAMUSCULAR
Refills: 1 | COMMUNITY
Start: 2019-01-06 | End: 2021-09-07

## 2019-02-14 RX ORDER — AMLODIPINE AND OLMESARTAN MEDOXOMIL 5; 40 MG/1; MG/1
TABLET ORAL
COMMUNITY
Start: 2019-01-29 | End: 2019-05-21 | Stop reason: SDUPTHER

## 2019-02-14 NOTE — PROGRESS NOTES
78 y.o.    Patient Care Team:  Napoleon Mead MD as PCP - General  Napoleon Mead MD as PCP - Family Medicine    Chief Complaint:      FOLLOW UP APPOINTMENT/ TYPE 2 DIABETES MELLITUS.  HYPOTHYROIDISM.  LAST LABS 12/05/18  Subjective     HPI    78-year-old white female is here as a follow up for the management of type 2 diabetes mellitus.    Type 2 diabetes mellitus-diagnosed about 10 years ago, if not more.  Today in the clinic she reports she is on Levemir 60 units at bedtime, Humalog 9 units with each meal plus a moderate dose sliding scale 3 times daily before meals, 2 units for 50 above 150, metformin thousand milligrams twice daily, Victoza 0.6 mg subcutaneous daily.  Has been checking her blood sugars 2 times a day.  She only eats 2 meals per day.  And that is when she takes the Humalog 9+ the sliding scale.  Average blood sugars are around 100-200.  She got her glucometer for me to review.  Last eye examination was last year, no history of diabetic retinopathy at that time.  Does have mild tingling and numbness, no burning sensation in her bilateral feet.  No ulcers or amputations of her toes.  No prior history of CAD, CK D, CVA  On ARB     Hyperlipidemia  On Lipitor 40 mg oral daily     Hypothyroidism  On levothyroxin 150 µg oral daily.      Reviewed primary care physician's/consulting physician documentation and lab results :     Interval History      The following portions of the patient's history were reviewed and updated as appropriate: allergies, current medications, past family history, past medical history, past social history, past surgical history and problem list.    Past Medical History:   Diagnosis Date   • Anxiety    • Arthritis    • Benign essential hypertension 08/20/2014   • 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CMS/[facility])    • Depression    • Diabetes (CMS/HCC)    • Diabetes mellitus, type 2 (CMS/HCC)    • Disc degeneration, lumbar    • Headache, tension-type    • Hyperlipidemia    • Hypothyroidism    •  Neuropathy    • Osteoporosis 2015   • Peripheral neuropathy    • Rotator cuff tear, left    • Scoliosis    • Shoulder pain     LEFT, TORN ROTATOR CUFF S/P FALL   • Spinal stenosis      Family History   Problem Relation Age of Onset   • Bone cancer Mother    • No Known Problems Father      Social History     Socioeconomic History   • Marital status:      Spouse name: Not on file   • Number of children: Not on file   • Years of education: Not on file   • Highest education level: Not on file   Social Needs   • Financial resource strain: Not on file   • Food insecurity - worry: Not on file   • Food insecurity - inability: Not on file   • Transportation needs - medical: Not on file   • Transportation needs - non-medical: Not on file   Occupational History   • Not on file   Tobacco Use   • Smoking status: Former Smoker     Packs/day: 1.00     Last attempt to quit: 2013     Years since quittin.1   • Smokeless tobacco: Never Used   Substance and Sexual Activity   • Alcohol use: No   • Drug use: No   • Sexual activity: Defer   Other Topics Concern   • Not on file   Social History Narrative   • Not on file     Allergies   Allergen Reactions   • Sulfa Antibiotics        Current Outpatient Medications:   •  atorvastatin (LIPITOR) 40 MG tablet, Take 1 tablet by mouth Every Night., Disp: , Rfl:   •  bisoprolol (ZEBeta) 5 MG tablet, Take 1 tablet by mouth Daily., Disp: 90 tablet, Rfl: 1  •  DULoxetine (CYMBALTA) 60 MG capsule, Take 1 capsule by mouth Daily., Disp: 90 capsule, Rfl: 1  •  hydrochlorothiazide (HYDRODIURIL) 12.5 MG tablet, Take 1 tablet by mouth Daily., Disp: 90 tablet, Rfl: 1  •  insulin detemir (LEVEMIR) 100 UNIT/ML injection, Inject 50 Units under the skin into the appropriate area as directed Every Night., Disp: 5 pen, Rfl: 0  •  Insulin Lispro (HUMALOG KWIKPEN) 100 UNIT/ML solution pen-injector, Inject 6 Units under the skin into the appropriate area as directed 4 (Three) Times a Day With Meals.  "Breakfast lunch dinner and snack, Disp: 10 pen, Rfl: 3  •  Insulin Pen Needle 32G X 4 MM misc, Use to inject insulin 4 TIMES DAILY, Disp: 200 each, Rfl: 0  •  lansoprazole (PREVACID) 30 MG capsule, Take 30 mg by mouth Daily., Disp: , Rfl:   •  levothyroxine (SYNTHROID, LEVOTHROID) 150 MCG tablet, Take 1 tablet by mouth Daily., Disp: 90 tablet, Rfl: 1  •  Liraglutide (VICTOZA) 18 MG/3ML solution pen-injector injection, Inject 1.8 mg under the skin into the appropriate area as directed Daily., Disp: 5 pen, Rfl: 3  •  metFORMIN (GLUCOPHAGE) 1000 MG tablet, TAKE 1 TABLET TWICE A DAY WITH MEALS, Disp: 180 tablet, Rfl: 3  •  amlodipine-olmesartan (MYAH) 5-40 MG per tablet, , Disp: , Rfl:   •  SHINGRIX 50 MCG/0.5ML reconstituted suspension, ADMINISTER VACCINE PER PHYSICIAN PROTOCOL, Disp: , Rfl: 1        Review of Systems   Constitutional: Negative for chills, fatigue and fever.   Cardiovascular: Negative for chest pain and palpitations.   Gastrointestinal: Negative for abdominal pain, constipation, diarrhea, nausea and vomiting.   Endocrine: Negative for cold intolerance and heat intolerance.       Objective       Vitals:    02/14/19 1153   BP: 130/80   Pulse: 73   Weight: 84.6 kg (186 lb 9.6 oz)   Height: 172.7 cm (68\")     Body mass index is 28.37 kg/m².      Physical Exam   Constitutional: She is oriented to person, place, and time. She appears well-nourished.   Obese     HENT:   Head: Normocephalic and atraumatic.   Wide neck   Eyes: Conjunctivae and EOM are normal. No scleral icterus.   Neck: Normal range of motion. Neck supple. No thyromegaly present.   Acanthosis nigricans   Cardiovascular: Normal rate and normal heart sounds.   Pulmonary/Chest: Effort normal and breath sounds normal. No stridor. She has no wheezes.   Abdominal: Soft. Bowel sounds are normal. She exhibits no distension. There is no tenderness.   Central obesity   Musculoskeletal: She exhibits no edema or tenderness.   Neurological: She is alert and " oriented to person, place, and time.   Skin: Skin is warm and dry. She is not diaphoretic.   Psychiatric: She has a normal mood and affect.   Vitals reviewed.    Results Review:     I reviewed the patient's new clinical results and mentioned them above in HPI and in plan as well.    Medical records reviewed  Summary:DONE      Results Encounter on 12/03/2018   Component Date Value Ref Range Status   • Free T4 12/05/2018 1.60  0.93 - 1.70 ng/dL Final   • TSH 12/05/2018 0.439  0.270 - 4.200 mIU/mL Final   • Hemoglobin A1C 12/05/2018 11.75* 4.80 - 5.60 % Final    Comment: Hemoglobin A1C Ranges:  Increased Risk for Diabetes  5.7% to 6.4%  Diabetes                     >= 6.5%  Diabetic Goal                < 7.0%     • Glucose 12/05/2018 290* 65 - 99 mg/dL Final   • BUN 12/05/2018 18  8 - 23 mg/dL Final   • Creatinine 12/05/2018 0.96  0.57 - 1.00 mg/dL Final   • eGFR Non  Am 12/05/2018 56* >60 mL/min/1.73 Final    Comment: The MDRD GFR formula is only valid for adults with stable  renal function between ages 18 and 70.     • eGFR  Am 12/05/2018 68  >60 mL/min/1.73 Final   • BUN/Creatinine Ratio 12/05/2018 18.8  7.0 - 25.0 Final   • Sodium 12/05/2018 139  136 - 145 mmol/L Final   • Potassium 12/05/2018 4.4  3.5 - 5.2 mmol/L Final   • Chloride 12/05/2018 96* 98 - 107 mmol/L Final   • Total CO2 12/05/2018 26.5  22.0 - 29.0 mmol/L Final   • Calcium 12/05/2018 11.8* 8.6 - 10.5 mg/dL Final     Lab Results   Component Value Date    HGBA1C 11.75 (H) 12/05/2018    HGBA1C 11.52 (H) 12/15/2017    HGBA1C 10.16 (H) 10/31/2017     Lab Results   Component Value Date    MICROALBUR 191.0 08/01/2017    CREATININE 0.96 12/05/2018     Imaging Results (most recent)     None                        Assessment and Plan:    Diagnoses and all orders for this visit:    Diabetes mellitus type 2, uncontrolled, with complications (CMS/[facility])  -     Basic Metabolic Panel  -     Hemoglobin A1c  -     TSH  -     T4, Free    Primary  "hypothyroidism  -     Basic Metabolic Panel  -     Hemoglobin A1c  -     TSH  -     T4, Free      Type 2 diabetes mellitus-severely uncontrolled  We will check HbA1c  Continue the current insulin regimen  Continue metformin thousand milligrams twice daily  Advised the patient to escalate on the dosage of Victoza if she is able to tolerate with no symptoms of nausea or vomiting  Goal is to increase it from 0.6 mg to 1.2 in 1 week, 1.8 in the following week if she is able to tolerate it.  We will make further changes based on her lab results.    Hypothyroidism  Continue levothyroxine 150 mcg oral daily    Hyperlipidemia  Continue Lipitor 40 mg oral daily.    Reviewed Lab results with the patient.             Solomon Barrow MD  02/14/19    EMR Dragon / transcription disclaimer:     \"Dictated utilizing Dragon dictation\".  "

## 2019-02-15 LAB
BUN SERPL-MCNC: 22 MG/DL (ref 8–27)
BUN/CREAT SERPL: 21 (ref 12–28)
CALCIUM SERPL-MCNC: 9.6 MG/DL (ref 8.7–10.3)
CHLORIDE SERPL-SCNC: 103 MMOL/L (ref 96–106)
CO2 SERPL-SCNC: 22 MMOL/L (ref 20–29)
CREAT SERPL-MCNC: 1.05 MG/DL (ref 0.57–1)
GLUCOSE SERPL-MCNC: 107 MG/DL (ref 65–99)
HBA1C MFR BLD: 8.1 % (ref 4.8–5.6)
INTERPRETATION: NORMAL
Lab: NORMAL
POTASSIUM SERPL-SCNC: 4.8 MMOL/L (ref 3.5–5.2)
SODIUM SERPL-SCNC: 143 MMOL/L (ref 134–144)
T4 FREE SERPL-MCNC: 1.52 NG/DL (ref 0.82–1.77)
TSH SERPL DL<=0.005 MIU/L-ACNC: 0.8 UIU/ML (ref 0.45–4.5)

## 2019-02-15 NOTE — PROGRESS NOTES
Mail results to pt, no treatment changes at this time.  No significant changes to her insulin regimen at this time.  Improved A1c from last visit.

## 2019-04-26 NOTE — TELEPHONE ENCOUNTER
Patient called the office stating that her levemir is costing over 1000  Insurance suggest lantus or toujeo   Let patient know that DR Barrow put in toujeo at same dosage  60 units at bedtime  Patient voice understanding

## 2019-04-29 RX ORDER — AMLODIPINE AND OLMESARTAN MEDOXOMIL 5; 40 MG/1; MG/1
TABLET ORAL
Qty: 90 TABLET | Refills: 1 | OUTPATIENT
Start: 2019-04-29

## 2019-05-15 DIAGNOSIS — M51.36 LUMBAR DEGENERATIVE DISC DISEASE: ICD-10-CM

## 2019-05-15 DIAGNOSIS — I10 BENIGN ESSENTIAL HYPERTENSION: Primary | ICD-10-CM

## 2019-05-15 DIAGNOSIS — G62.9 NEUROPATHY: ICD-10-CM

## 2019-05-15 DIAGNOSIS — E03.9 PRIMARY HYPOTHYROIDISM: ICD-10-CM

## 2019-05-15 DIAGNOSIS — E55.9 VITAMIN D DEFICIENCY DISEASE: ICD-10-CM

## 2019-05-15 DIAGNOSIS — E13.43: ICD-10-CM

## 2019-05-15 DIAGNOSIS — E78.5 HYPERLIPIDEMIA, UNSPECIFIED HYPERLIPIDEMIA TYPE: ICD-10-CM

## 2019-05-15 RX ORDER — DULOXETIN HYDROCHLORIDE 60 MG/1
CAPSULE, DELAYED RELEASE ORAL
Qty: 90 CAPSULE | Refills: 0 | Status: SHIPPED | OUTPATIENT
Start: 2019-05-15 | End: 2019-05-21 | Stop reason: SDUPTHER

## 2019-05-16 ENCOUNTER — OFFICE VISIT (OUTPATIENT)
Dept: ENDOCRINOLOGY | Age: 79
End: 2019-05-16

## 2019-05-16 VITALS
SYSTOLIC BLOOD PRESSURE: 130 MMHG | HEIGHT: 68 IN | DIASTOLIC BLOOD PRESSURE: 82 MMHG | HEART RATE: 80 BPM | OXYGEN SATURATION: 99 % | BODY MASS INDEX: 26.52 KG/M2 | WEIGHT: 175 LBS

## 2019-05-16 DIAGNOSIS — Z79.4 TYPE 2 DIABETES MELLITUS WITH OTHER CIRCULATORY COMPLICATION, WITH LONG-TERM CURRENT USE OF INSULIN (HCC): Primary | ICD-10-CM

## 2019-05-16 DIAGNOSIS — E55.9 VITAMIN D DEFICIENCY: ICD-10-CM

## 2019-05-16 DIAGNOSIS — E78.2 MIXED HYPERLIPIDEMIA: ICD-10-CM

## 2019-05-16 DIAGNOSIS — E11.59 TYPE 2 DIABETES MELLITUS WITH OTHER CIRCULATORY COMPLICATION, WITH LONG-TERM CURRENT USE OF INSULIN (HCC): Primary | ICD-10-CM

## 2019-05-16 DIAGNOSIS — E03.9 PRIMARY HYPOTHYROIDISM: ICD-10-CM

## 2019-05-16 LAB
25(OH)D3+25(OH)D2 SERPL-MCNC: 45.7 NG/ML (ref 30–100)
ALBUMIN SERPL-MCNC: 3.9 G/DL (ref 3.5–5.2)
ALBUMIN/GLOB SERPL: 1.1 G/DL
ALP SERPL-CCNC: 67 U/L (ref 39–117)
ALT SERPL-CCNC: 16 U/L (ref 1–33)
AST SERPL-CCNC: 25 U/L (ref 1–32)
BASOPHILS # BLD AUTO: 0.12 10*3/MM3 (ref 0–0.2)
BASOPHILS NFR BLD AUTO: 1 % (ref 0–1.5)
BILIRUB SERPL-MCNC: 0.3 MG/DL (ref 0.2–1.2)
BUN SERPL-MCNC: 15 MG/DL (ref 8–23)
BUN/CREAT SERPL: 15.5 (ref 7–25)
CALCIUM SERPL-MCNC: 11.1 MG/DL (ref 8.6–10.5)
CHLORIDE SERPL-SCNC: 102 MMOL/L (ref 98–107)
CHOLEST SERPL-MCNC: 169 MG/DL (ref 0–200)
CO2 SERPL-SCNC: 25.5 MMOL/L (ref 22–29)
CREAT SERPL-MCNC: 0.97 MG/DL (ref 0.57–1)
EOSINOPHIL # BLD AUTO: 0.34 10*3/MM3 (ref 0–0.4)
EOSINOPHIL NFR BLD AUTO: 2.9 % (ref 0.3–6.2)
ERYTHROCYTE [DISTWIDTH] IN BLOOD BY AUTOMATED COUNT: 14 % (ref 12.3–15.4)
GLOBULIN SER CALC-MCNC: 3.7 GM/DL
GLUCOSE SERPL-MCNC: 101 MG/DL (ref 65–99)
HBA1C MFR BLD: 5.9 % (ref 4.8–5.6)
HCT VFR BLD AUTO: 41.2 % (ref 34–46.6)
HDLC SERPL-MCNC: 39 MG/DL (ref 40–60)
HGB BLD-MCNC: 12.8 G/DL (ref 12–15.9)
IMM GRANULOCYTES # BLD AUTO: 0.06 10*3/MM3 (ref 0–0.05)
IMM GRANULOCYTES NFR BLD AUTO: 0.5 % (ref 0–0.5)
LDLC SERPL CALC-MCNC: 97 MG/DL (ref 0–100)
LDLC/HDLC SERPL: 2.49 {RATIO}
LYMPHOCYTES # BLD AUTO: 1.75 10*3/MM3 (ref 0.7–3.1)
LYMPHOCYTES NFR BLD AUTO: 14.7 % (ref 19.6–45.3)
MCH RBC QN AUTO: 29.4 PG (ref 26.6–33)
MCHC RBC AUTO-ENTMCNC: 31.1 G/DL (ref 31.5–35.7)
MCV RBC AUTO: 94.7 FL (ref 79–97)
MONOCYTES # BLD AUTO: 0.52 10*3/MM3 (ref 0.1–0.9)
MONOCYTES NFR BLD AUTO: 4.4 % (ref 5–12)
NEUTROPHILS # BLD AUTO: 9.13 10*3/MM3 (ref 1.7–7)
NEUTROPHILS NFR BLD AUTO: 76.5 % (ref 42.7–76)
NRBC BLD AUTO-RTO: 0 /100 WBC (ref 0–0.2)
PLATELET # BLD AUTO: 459 10*3/MM3 (ref 140–450)
POTASSIUM SERPL-SCNC: 4.4 MMOL/L (ref 3.5–5.2)
PROT SERPL-MCNC: 7.6 G/DL (ref 6–8.5)
RBC # BLD AUTO: 4.35 10*6/MM3 (ref 3.77–5.28)
SODIUM SERPL-SCNC: 141 MMOL/L (ref 136–145)
TRIGL SERPL-MCNC: 164 MG/DL (ref 0–150)
TSH SERPL DL<=0.005 MIU/L-ACNC: 1.46 MIU/ML (ref 0.27–4.2)
UNABLE TO VOID: NORMAL
VLDLC SERPL CALC-MCNC: 32.8 MG/DL
WBC # BLD AUTO: 11.92 10*3/MM3 (ref 3.4–10.8)

## 2019-05-16 PROCEDURE — 99214 OFFICE O/P EST MOD 30 MIN: CPT | Performed by: INTERNAL MEDICINE

## 2019-05-16 RX ORDER — ATORVASTATIN CALCIUM 40 MG/1
40 TABLET, FILM COATED ORAL NIGHTLY
Start: 2019-05-16 | End: 2019-08-16

## 2019-05-16 NOTE — PROGRESS NOTES
78 y.o.    Patient Care Team:  Napoleon Mead MD as PCP - General  Napoleon Mead MD as PCP - Family Medicine    Chief Complaint:    FOLLOW UP/ TYPE 2 DIABETES MELLITUS   Subjective     HPI    78-year old white female is here for the follow-up of type 2 diabetes mellitus, hypothyroidism.    Type 2 diabetes mellitus-diagnosed about 10 years ago, if not more.  Today in the clinic patient reports that she is on Levemir 60 units at bedtime, Humalog 9 units with breakfast and dinner, Humalog sliding scale of 2 units for 50 above 150, metformin thousand milligrams oral once daily, Victoza 1.8 milligrams subcutaneous daily.  Has been checking her blood sugars 2 times a day.  Average blood sugars are around .  She got her glucometer for me to review.  Last eye examination was last year, no history of diabetic retinopathy at that time.  Does have mild tingling and numbness, no burning sensation in her bilateral feet.  No ulcers or amputations of her toes.  No prior history of CAD, CK D, CVA  On ARB     Hyperlipidemia  On Lipitor 40 mg oral daily     Hypothyroidism  On levothyroxin 150 µg oral daily.  Reviewed primary care physician's/consulting physician documentation and lab results :     Interval History      The following portions of the patient's history were reviewed and updated as appropriate: allergies, current medications, past family history, past medical history, past social history, past surgical history and problem list.    Past Medical History:   Diagnosis Date   • Anxiety    • Arthritis    • Benign essential hypertension 08/20/2014   • 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CMS/[facility])    • Depression    • Diabetes (CMS/[facility])    • Diabetes mellitus, type 2 (CMS/[facility])    • Disc degeneration, lumbar    • Headache, tension-type    • Hyperlipidemia    • Hypothyroidism    • Neuropathy    • Osteoporosis 09/09/2015   • Peripheral neuropathy    • Rotator cuff tear, left    • Scoliosis    • Shoulder pain     LEFT, TORN ROTATOR CUFF  S/P FALL   • Spinal stenosis      Family History   Problem Relation Age of Onset   • Bone cancer Mother    • No Known Problems Father      Social History     Socioeconomic History   • Marital status:      Spouse name: Not on file   • Number of children: Not on file   • Years of education: Not on file   • Highest education level: Not on file   Tobacco Use   • Smoking status: Former Smoker     Packs/day: 1.00     Last attempt to quit:      Years since quittin.3   • Smokeless tobacco: Never Used   Substance and Sexual Activity   • Alcohol use: No   • Drug use: No   • Sexual activity: Defer     Allergies   Allergen Reactions   • Sulfa Antibiotics        Current Outpatient Medications:   •  amlodipine-olmesartan (MYAH) 5-40 MG per tablet, , Disp: , Rfl:   •  atorvastatin (LIPITOR) 40 MG tablet, Take 1 tablet by mouth Every Night., Disp: , Rfl:   •  bisoprolol (ZEBeta) 5 MG tablet, Take 1 tablet by mouth Daily., Disp: 90 tablet, Rfl: 1  •  DULoxetine (CYMBALTA) 60 MG capsule, TAKE 1 CAPSULE DAILY, Disp: 90 capsule, Rfl: 0  •  hydrochlorothiazide (HYDRODIURIL) 12.5 MG tablet, Take 1 tablet by mouth Daily., Disp: 90 tablet, Rfl: 1  •  Insulin Glargine (TOUJEO SOLOSTAR) 300 UNIT/ML solution pen-injector, Inject 60 Units under the skin into the appropriate area as directed every night at bedtime., Disp: 20 pen, Rfl: 3  •  Insulin Lispro (HUMALOG KWIKPEN) 100 UNIT/ML solution pen-injector, Inject 6 Units under the skin into the appropriate area as directed 4 (Three) Times a Day With Meals. Breakfast lunch dinner and snack, Disp: 10 pen, Rfl: 3  •  Insulin Pen Needle (RELION PEN NEEDLES) 32G X 4 MM misc, USE TO INJECT INSULIN 4 TIMES A DAY, Disp: 400 each, Rfl: 0  •  Insulin Pen Needle 32G X 4 MM misc, Use to inject insulin 4 TIMES DAILY, Disp: 200 each, Rfl: 0  •  lansoprazole (PREVACID) 30 MG capsule, Take 30 mg by mouth Daily., Disp: , Rfl:   •  levothyroxine (SYNTHROID, LEVOTHROID) 150 MCG tablet, Take 1  "tablet by mouth Daily., Disp: 90 tablet, Rfl: 1  •  Liraglutide (VICTOZA) 18 MG/3ML solution pen-injector injection, Inject 1.8 mg under the skin into the appropriate area as directed Daily., Disp: 5 pen, Rfl: 3  •  metFORMIN (GLUCOPHAGE) 1000 MG tablet, Take 1 tablet by mouth Daily With Breakfast., Disp: 180 tablet, Rfl: 3  •  SHINGRIX 50 MCG/0.5ML reconstituted suspension, ADMINISTER VACCINE PER PHYSICIAN PROTOCOL, Disp: , Rfl: 1        Review of Systems   Constitutional: Positive for appetite change (decrease) and fatigue. Negative for fever.   Eyes: Negative for visual disturbance.   Respiratory: Negative for shortness of breath.    Cardiovascular: Negative for palpitations and leg swelling.   Gastrointestinal: Negative for abdominal pain and vomiting.   Endocrine: Negative for polydipsia and polyuria.   Musculoskeletal: Positive for arthralgias (legs) and back pain. Negative for joint swelling and neck pain.   Skin: Negative for rash.   Neurological: Negative for weakness and numbness.   Psychiatric/Behavioral: Negative for behavioral problems.       Objective       Vitals:    05/16/19 1400   BP: 130/82   Pulse: 80   SpO2: 99%   Weight: 79.4 kg (175 lb)   Height: 172.7 cm (68\")     Body mass index is 26.61 kg/m².      Physical Exam   Constitutional: She is oriented to person, place, and time. She appears well-nourished.   Obese     HENT:   Head: Normocephalic and atraumatic.   Wide neck   Eyes: Conjunctivae and EOM are normal. No scleral icterus.   Neck: Normal range of motion. Neck supple. No thyromegaly present.   Acanthosis nigricans   Cardiovascular: Normal rate and normal heart sounds.   Pulmonary/Chest: Effort normal and breath sounds normal. No stridor. She has no wheezes.   Abdominal: Soft. Bowel sounds are normal. She exhibits no distension. There is no tenderness.   Central obesity   Musculoskeletal: She exhibits no edema or tenderness.   Neurological: She is alert and oriented to person, place, and " time.   Skin: Skin is warm and dry. She is not diaphoretic.   Psychiatric: She has a normal mood and affect.   Vitals reviewed.    Results Review:     I reviewed the patient's new clinical results and mentioned them above in HPI and in plan as well.    Medical records reviewed  Summary:done      Refill on 05/15/2019   Component Date Value Ref Range Status   • Glucose 05/15/2019 101* 65 - 99 mg/dL Final   • BUN 05/15/2019 15  8 - 23 mg/dL Final   • Creatinine 05/15/2019 0.97  0.57 - 1.00 mg/dL Final   • eGFR Non African Am 05/15/2019 56* >60 mL/min/1.73 Final    Comment: The MDRD GFR formula is only valid for adults with stable  renal function between ages 18 and 70.     • eGFR  Am 05/15/2019 67  >60 mL/min/1.73 Final   • BUN/Creatinine Ratio 05/15/2019 15.5  7.0 - 25.0 Final   • Sodium 05/15/2019 141  136 - 145 mmol/L Final   • Potassium 05/15/2019 4.4  3.5 - 5.2 mmol/L Final   • Chloride 05/15/2019 102  98 - 107 mmol/L Final   • Total CO2 05/15/2019 25.5  22.0 - 29.0 mmol/L Final   • Calcium 05/15/2019 11.1* 8.6 - 10.5 mg/dL Final   • Total Protein 05/15/2019 7.6  6.0 - 8.5 g/dL Final   • Albumin 05/15/2019 3.90  3.50 - 5.20 g/dL Final   • Globulin 05/15/2019 3.7  gm/dL Final   • A/G Ratio 05/15/2019 1.1  g/dL Final   • Total Bilirubin 05/15/2019 0.3  0.2 - 1.2 mg/dL Final   • Alkaline Phosphatase 05/15/2019 67  39 - 117 U/L Final   • AST (SGOT) 05/15/2019 25  1 - 32 U/L Final   • ALT (SGPT) 05/15/2019 16  1 - 33 U/L Final   • Total Cholesterol 05/15/2019 169  0 - 200 mg/dL Final   • Triglycerides 05/15/2019 164* 0 - 150 mg/dL Final   • HDL Cholesterol 05/15/2019 39* 40 - 60 mg/dL Final   • VLDL Cholesterol 05/15/2019 32.8  mg/dL Final   • LDL Cholesterol  05/15/2019 97  0 - 100 mg/dL Final   • LDL/HDL Ratio 05/15/2019 2.49   Final   • WBC 05/15/2019 11.92* 3.40 - 10.80 10*3/mm3 Final   • RBC 05/15/2019 4.35  3.77 - 5.28 10*6/mm3 Final   • Hemoglobin 05/15/2019 12.8  12.0 - 15.9 g/dL Final   • Hematocrit  05/15/2019 41.2  34.0 - 46.6 % Final   • MCV 05/15/2019 94.7  79.0 - 97.0 fL Final   • MCH 05/15/2019 29.4  26.6 - 33.0 pg Final   • MCHC 05/15/2019 31.1* 31.5 - 35.7 g/dL Final   • RDW 05/15/2019 14.0  12.3 - 15.4 % Final   • Platelets 05/15/2019 459* 140 - 450 10*3/mm3 Final   • Neutrophil Rel % 05/15/2019 76.5* 42.7 - 76.0 % Final   • Lymphocyte Rel % 05/15/2019 14.7* 19.6 - 45.3 % Final   • Monocyte Rel % 05/15/2019 4.4* 5.0 - 12.0 % Final   • Eosinophil Rel % 05/15/2019 2.9  0.3 - 6.2 % Final   • Basophil Rel % 05/15/2019 1.0  0.0 - 1.5 % Final   • Neutrophils Absolute 05/15/2019 9.13* 1.70 - 7.00 10*3/mm3 Final   • Lymphocytes Absolute 05/15/2019 1.75  0.70 - 3.10 10*3/mm3 Final   • Monocytes Absolute 05/15/2019 0.52  0.10 - 0.90 10*3/mm3 Final   • Eosinophils Absolute 05/15/2019 0.34  0.00 - 0.40 10*3/mm3 Final   • Basophils Absolute 05/15/2019 0.12  0.00 - 0.20 10*3/mm3 Final   • Immature Granulocyte Rel % 05/15/2019 0.5  0.0 - 0.5 % Final   • Immature Grans Absolute 05/15/2019 0.06* 0.00 - 0.05 10*3/mm3 Final   • nRBC 05/15/2019 0.0  0.0 - 0.2 /100 WBC Final   • TSH 05/15/2019 1.460  0.270 - 4.200 mIU/mL Final   • 25 Hydroxy, Vitamin D 05/15/2019 45.7  30.0 - 100.0 ng/ml Final    Comment: Reference Range for Total Vitamin D 25(OH)  Deficiency <20.0 ng/mL  Insufficiency 21-29 ng/mL  Sufficiency  ng/mL  Toxicity >100 ng/ml     • Hemoglobin A1C 05/15/2019 5.90* 4.80 - 5.60 % Final    Comment: Hemoglobin A1C Ranges:  Increased Risk for Diabetes  5.7% to 6.4%  Diabetes                     >= 6.5%  Diabetic Goal                < 7.0%     • Unable to Void 05/15/2019 Comment   Final    Comment: The patient was not able to render a urine sample and has been  instructed to return for a urine collection at their earliest  convenience.  The urine testing that you have requested has  been deleted from this report.  When the patient returns and  provides a urine specimen, the urine testing will be performed  and  separately reported.       Lab Results   Component Value Date    HGBA1C 5.90 (H) 05/15/2019    HGBA1C 8.1 (H) 02/14/2019    HGBA1C 11.75 (H) 12/05/2018     Lab Results   Component Value Date    MICROALBUR 191.0 08/01/2017    CREATININE 0.97 05/15/2019     Imaging Results (most recent)     None                Assessment and Plan:    Halie was seen today for diabetes.    Diagnoses and all orders for this visit:    Type 2 diabetes mellitus with other circulatory complication, with long-term current use of insulin (CMS/[facility])  -     Hemoglobin A1c; Future  -     Basic Metabolic Panel; Future  -     Lipid Panel; Future  -     Microalbumin / Creatinine Urine Ratio - Urine, Clean Catch; Future  -     TSH; Future  -     T4, Free; Future  -     Calcium, Ionized; Future  -     PTH, Intact & Calcium; Future  -     Vitamin D 25 Hydroxy; Future  -     Phosphorus; Future    Primary hypothyroidism  -     Hemoglobin A1c; Future  -     Basic Metabolic Panel; Future  -     Lipid Panel; Future  -     Microalbumin / Creatinine Urine Ratio - Urine, Clean Catch; Future  -     TSH; Future  -     T4, Free; Future  -     Calcium, Ionized; Future  -     PTH, Intact & Calcium; Future  -     Vitamin D 25 Hydroxy; Future  -     Phosphorus; Future    Mixed hyperlipidemia  -     Hemoglobin A1c; Future  -     Basic Metabolic Panel; Future  -     Lipid Panel; Future  -     Microalbumin / Creatinine Urine Ratio - Urine, Clean Catch; Future  -     TSH; Future  -     T4, Free; Future  -     Calcium, Ionized; Future  -     PTH, Intact & Calcium; Future  -     Vitamin D 25 Hydroxy; Future  -     Phosphorus; Future    Vitamin D deficiency   -     Vitamin D 25 Hydroxy; Future    Other orders  -     metFORMIN (GLUCOPHAGE) 1000 MG tablet; Take 1 tablet by mouth Daily With Breakfast.  -     atorvastatin (LIPITOR) 40 MG tablet; Take 1 tablet by mouth Every Night.        Type 2 diabetes mellitus-uncontrolled  Uncontrolled, complicated with hypoglycemic  "episodes  Continue Toujeo at 60 units at bedtime  Toujeo is similar to Levemir  Discontinue pre-meal Humalog  Continue Humalog sliding scale  Continue metformin thousand milligrams oral daily and Victoza.    Hyperlipidemia  Continue Lipitor 40 mg oral daily    Hypothyroidism  Continue levothyroxine 150 mcg oral daily.    Reviewed Lab results with the patient.             Solomon Barrow MD  05/16/19    EMR Dragon / transcription disclaimer:     \"Dictated utilizing Dragon dictation\".  "

## 2019-05-20 DIAGNOSIS — I10 BENIGN ESSENTIAL HYPERTENSION: ICD-10-CM

## 2019-05-20 RX ORDER — HYDROCHLOROTHIAZIDE 12.5 MG/1
TABLET ORAL
Qty: 90 TABLET | Refills: 1 | OUTPATIENT
Start: 2019-05-20

## 2019-05-20 RX ORDER — KETOCONAZOLE 20 MG/G
CREAM TOPICAL
Status: ON HOLD | COMMUNITY
Start: 2019-05-15 | End: 2021-12-02

## 2019-05-21 ENCOUNTER — OFFICE VISIT (OUTPATIENT)
Dept: FAMILY MEDICINE CLINIC | Facility: CLINIC | Age: 79
End: 2019-05-21

## 2019-05-21 VITALS
HEIGHT: 68 IN | RESPIRATION RATE: 14 BRPM | TEMPERATURE: 97.5 F | HEART RATE: 60 BPM | WEIGHT: 174 LBS | SYSTOLIC BLOOD PRESSURE: 144 MMHG | DIASTOLIC BLOOD PRESSURE: 68 MMHG | BODY MASS INDEX: 26.37 KG/M2

## 2019-05-21 DIAGNOSIS — E03.9 PRIMARY HYPOTHYROIDISM: ICD-10-CM

## 2019-05-21 DIAGNOSIS — I10 BENIGN ESSENTIAL HYPERTENSION: Primary | ICD-10-CM

## 2019-05-21 DIAGNOSIS — R56.9 SEIZURE (HCC): ICD-10-CM

## 2019-05-21 DIAGNOSIS — G62.9 NEUROPATHY: ICD-10-CM

## 2019-05-21 DIAGNOSIS — M51.36 LUMBAR DEGENERATIVE DISC DISEASE: ICD-10-CM

## 2019-05-21 PROCEDURE — 99214 OFFICE O/P EST MOD 30 MIN: CPT | Performed by: FAMILY MEDICINE

## 2019-05-21 RX ORDER — AMLODIPINE AND OLMESARTAN MEDOXOMIL 5; 40 MG/1; MG/1
1 TABLET ORAL DAILY
Qty: 90 TABLET | Refills: 1 | Status: SHIPPED | OUTPATIENT
Start: 2019-05-21 | End: 2019-05-21 | Stop reason: SDUPTHER

## 2019-05-21 RX ORDER — BISOPROLOL FUMARATE 5 MG/1
5 TABLET, FILM COATED ORAL
Qty: 90 TABLET | Refills: 1 | Status: SHIPPED | OUTPATIENT
Start: 2019-05-21 | End: 2020-01-27 | Stop reason: SDUPTHER

## 2019-05-21 RX ORDER — DULOXETIN HYDROCHLORIDE 60 MG/1
60 CAPSULE, DELAYED RELEASE ORAL DAILY
Qty: 90 CAPSULE | Refills: 1 | Status: SHIPPED | OUTPATIENT
Start: 2019-05-21 | End: 2020-01-16

## 2019-05-21 RX ORDER — LEVOTHYROXINE SODIUM 0.15 MG/1
150 TABLET ORAL DAILY
Qty: 90 TABLET | Refills: 1 | Status: SHIPPED | OUTPATIENT
Start: 2019-05-21 | End: 2019-08-16

## 2019-05-21 RX ORDER — AMLODIPINE AND OLMESARTAN MEDOXOMIL 5; 40 MG/1; MG/1
1 TABLET ORAL DAILY
Qty: 14 TABLET | Refills: 1 | Status: SHIPPED | OUTPATIENT
Start: 2019-05-21 | End: 2020-01-16

## 2019-05-21 RX ORDER — HYDROCHLOROTHIAZIDE 12.5 MG/1
12.5 TABLET ORAL DAILY
Qty: 90 TABLET | Refills: 1 | Status: SHIPPED | OUTPATIENT
Start: 2019-05-21 | End: 2020-01-27 | Stop reason: SDUPTHER

## 2019-05-21 NOTE — PROGRESS NOTES
Subjective   Halie Guidry is a 78 y.o. female.     History of Present Illness     Chief Complaint:   Chief Complaint   Patient presents with   • Hypertension     med refill  - [facility]    • Hyperlipidemia     out of bp meds 1-2 weeks  francesca bp done today   • Hypothyroidism       Halie Guidry 78 y.o. female who presents today for Medical Management of the below listed issues and medication refills.  she has a problem list of   Patient Active Problem List   Diagnosis   • Compression fracture   • Lumbar degenerative disc disease   • Diabetes mellitus, type 2 (CMS/HCC)   • Hyperlipidemia   • Benign essential hypertension   • Primary hypothyroidism   • Leukocytosis   • Neuropathy   • Osteoporosis   • Proteinuria   • Tobacco abuse   • Diabetic eye exam (CMS/HCC)   • Generalized weakness   • Spinal stenosis   • Scoliosis   • Arthritis   • VBI (vertebrobasilar insufficiency)   • Orthostatic hypotension   • 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 (CMS/HCC)   • Severe hypothyroidism   • Noncompliance   • Vitamin D deficiency disease   • Altered mental status   • Tremor   • Seizure (CMS/[facility])   .  Since the last visit, she has overall felt well.  she has been compliant with   Current Outpatient Medications:   •  amlodipine-olmesartan (MYAH) 5-40 MG per tablet, Take 1 tablet by mouth Daily., Disp: 14 tablet, Rfl: 1  •  atorvastatin (LIPITOR) 40 MG tablet, Take 1 tablet by mouth Every Night., Disp: , Rfl:   •  bisoprolol (ZEBeta) 5 MG tablet, Take 1 tablet by mouth Daily., Disp: 90 tablet, Rfl: 1  •  DULoxetine (CYMBALTA) 60 MG capsule, Take 1 capsule by mouth Daily., Disp: 90 capsule, Rfl: 1  •  hydrochlorothiazide (HYDRODIURIL) 12.5 MG tablet, Take 1 tablet by mouth Daily., Disp: 90 tablet, Rfl: 1  •  Insulin Glargine (TOUJEO SOLOSTAR) 300 UNIT/ML solution pen-injector, Inject 60 Units under the skin into the appropriate area as directed every night at bedtime., Disp: 20 pen, Rfl: 3  •   "Insulin Lispro (HUMALOG KWIKPEN) 100 UNIT/ML solution pen-injector, Inject 6 Units under the skin into the appropriate area as directed 4 (Three) Times a Day With Meals. Breakfast lunch dinner and snack, Disp: 10 pen, Rfl: 3  •  Insulin Pen Needle (RELION PEN NEEDLES) 32G X 4 MM misc, USE TO INJECT INSULIN 4 TIMES A DAY, Disp: 400 each, Rfl: 0  •  Insulin Pen Needle 32G X 4 MM misc, Use to inject insulin 4 TIMES DAILY, Disp: 200 each, Rfl: 0  •  ketoconazole (NIZORAL) 2 % cream, , Disp: , Rfl:   •  lansoprazole (PREVACID) 30 MG capsule, Take 30 mg by mouth Daily., Disp: , Rfl:   •  levothyroxine (SYNTHROID, LEVOTHROID) 150 MCG tablet, Take 1 tablet by mouth Daily., Disp: 90 tablet, Rfl: 1  •  Liraglutide (VICTOZA) 18 MG/3ML solution pen-injector injection, Inject 1.8 mg under the skin into the appropriate area as directed Daily., Disp: 5 pen, Rfl: 3  •  metFORMIN (GLUCOPHAGE) 1000 MG tablet, Take 1 tablet by mouth Daily With Breakfast., Disp: 180 tablet, Rfl: 3  •  SHINGRIX 50 MCG/0.5ML reconstituted suspension, ADMINISTER VACCINE PER PHYSICIAN PROTOCOL, Disp: , Rfl: 1.  she denies medication side effects.    She is doing well with endocrinology.    All of the chronic condition(s) listed above are stable w/o issues.    /68   Pulse 60   Temp 97.5 °F (36.4 °C) (Oral)   Resp 14   Ht 172.7 cm (68\")   Wt 78.9 kg (174 lb)   BMI 26.46 kg/m²     Results for orders placed or performed in visit on 05/15/19   Comprehensive metabolic panel   Result Value Ref Range    Glucose 101 (H) 65 - 99 mg/dL    BUN 15 8 - 23 mg/dL    Creatinine 0.97 0.57 - 1.00 mg/dL    eGFR Non African Am 56 (L) >60 mL/min/1.73    eGFR African Am 67 >60 mL/min/1.73    BUN/Creatinine Ratio 15.5 7.0 - 25.0    Sodium 141 136 - 145 mmol/L    Potassium 4.4 3.5 - 5.2 mmol/L    Chloride 102 98 - 107 mmol/L    Total CO2 25.5 22.0 - 29.0 mmol/L    Calcium 11.1 (H) 8.6 - 10.5 mg/dL    Total Protein 7.6 6.0 - 8.5 g/dL    Albumin 3.90 3.50 - 5.20 g/dL    " Globulin 3.7 gm/dL    A/G Ratio 1.1 g/dL    Total Bilirubin 0.3 0.2 - 1.2 mg/dL    Alkaline Phosphatase 67 39 - 117 U/L    AST (SGOT) 25 1 - 32 U/L    ALT (SGPT) 16 1 - 33 U/L   Lipid Panel With LDL/HDL Ratio   Result Value Ref Range    Total Cholesterol 169 0 - 200 mg/dL    Triglycerides 164 (H) 0 - 150 mg/dL    HDL Cholesterol 39 (L) 40 - 60 mg/dL    VLDL Cholesterol 32.8 mg/dL    LDL Cholesterol  97 0 - 100 mg/dL    LDL/HDL Ratio 2.49    TSH   Result Value Ref Range    TSH 1.460 0.270 - 4.200 mIU/mL   Vitamin D 25 Hydroxy   Result Value Ref Range    25 Hydroxy, Vitamin D 45.7 30.0 - 100.0 ng/ml   Hemoglobin A1c   Result Value Ref Range    Hemoglobin A1C 5.90 (H) 4.80 - 5.60 %   Unable To Void   Result Value Ref Range    Unable to Void Comment    CBC and Differential   Result Value Ref Range    WBC 11.92 (H) 3.40 - 10.80 10*3/mm3    RBC 4.35 3.77 - 5.28 10*6/mm3    Hemoglobin 12.8 12.0 - 15.9 g/dL    Hematocrit 41.2 34.0 - 46.6 %    MCV 94.7 79.0 - 97.0 fL    MCH 29.4 26.6 - 33.0 pg    MCHC 31.1 (L) 31.5 - 35.7 g/dL    RDW 14.0 12.3 - 15.4 %    Platelets 459 (H) 140 - 450 10*3/mm3    Neutrophil Rel % 76.5 (H) 42.7 - 76.0 %    Lymphocyte Rel % 14.7 (L) 19.6 - 45.3 %    Monocyte Rel % 4.4 (L) 5.0 - 12.0 %    Eosinophil Rel % 2.9 0.3 - 6.2 %    Basophil Rel % 1.0 0.0 - 1.5 %    Neutrophils Absolute 9.13 (H) 1.70 - 7.00 10*3/mm3    Lymphocytes Absolute 1.75 0.70 - 3.10 10*3/mm3    Monocytes Absolute 0.52 0.10 - 0.90 10*3/mm3    Eosinophils Absolute 0.34 0.00 - 0.40 10*3/mm3    Basophils Absolute 0.12 0.00 - 0.20 10*3/mm3    Immature Granulocyte Rel % 0.5 0.0 - 0.5 %    Immature Grans Absolute 0.06 (H) 0.00 - 0.05 10*3/mm3    nRBC 0.0 0.0 - 0.2 /100 WBC           The following portions of the patient's history were reviewed and updated as appropriate: allergies, current medications, past family history, past medical history, past social history, past surgical history and problem list.    Review of Systems    Constitutional: Negative for activity change, chills, fatigue and fever.   Respiratory: Negative for cough and chest tightness.    Cardiovascular: Negative for chest pain and palpitations.   Gastrointestinal: Negative for abdominal pain and nausea.   Endocrine: Negative for cold intolerance.   Psychiatric/Behavioral: Negative for behavioral problems and dysphoric mood.       Objective   Physical Exam   Constitutional: She appears well-developed and well-nourished.   Neck: Neck supple. No thyromegaly present.   Cardiovascular: Normal rate and regular rhythm.   No murmur heard.  Pulmonary/Chest: Effort normal and breath sounds normal.   Abdominal: Bowel sounds are normal. There is no tenderness.   Neurological: She is alert.   Psychiatric: She has a normal mood and affect. Her behavior is normal.   Nursing note and vitals reviewed.  Labs reviewed with pt today during visit. All questions answered.      Assessment/Plan   Halie was seen today for hypertension, hyperlipidemia and hypothyroidism.    Diagnoses and all orders for this visit:    Benign essential hypertension  -     bisoprolol (ZEBeta) 5 MG tablet; Take 1 tablet by mouth Daily.  -     hydrochlorothiazide (HYDRODIURIL) 12.5 MG tablet; Take 1 tablet by mouth Daily.  -     Discontinue: amlodipine-olmesartan (MYAH) 5-40 MG per tablet; Take 1 tablet by mouth Daily.  -     amlodipine-olmesartan (MYAH) 5-40 MG per tablet; Take 1 tablet by mouth Daily.    Neuropathy  -     DULoxetine (CYMBALTA) 60 MG capsule; Take 1 capsule by mouth Daily.    Lumbar degenerative disc disease  -     DULoxetine (CYMBALTA) 60 MG capsule; Take 1 capsule by mouth Daily.    Primary hypothyroidism  -     levothyroxine (SYNTHROID, LEVOTHROID) 150 MCG tablet; Take 1 tablet by mouth Daily.    Seizure (CMS/HCC)

## 2019-05-22 LAB — MICROALBUMIN UR-MCNC: 15 UG/ML

## 2019-08-14 ENCOUNTER — RESULTS ENCOUNTER (OUTPATIENT)
Dept: ENDOCRINOLOGY | Age: 79
End: 2019-08-14

## 2019-08-14 DIAGNOSIS — E11.59 TYPE 2 DIABETES MELLITUS WITH OTHER CIRCULATORY COMPLICATION, WITH LONG-TERM CURRENT USE OF INSULIN (HCC): ICD-10-CM

## 2019-08-14 DIAGNOSIS — E03.9 PRIMARY HYPOTHYROIDISM: ICD-10-CM

## 2019-08-14 DIAGNOSIS — E78.2 MIXED HYPERLIPIDEMIA: ICD-10-CM

## 2019-08-14 DIAGNOSIS — Z79.4 TYPE 2 DIABETES MELLITUS WITH OTHER CIRCULATORY COMPLICATION, WITH LONG-TERM CURRENT USE OF INSULIN (HCC): ICD-10-CM

## 2019-08-14 DIAGNOSIS — E55.9 VITAMIN D DEFICIENCY: ICD-10-CM

## 2019-08-16 ENCOUNTER — OFFICE VISIT (OUTPATIENT)
Dept: ENDOCRINOLOGY | Age: 79
End: 2019-08-16

## 2019-08-16 VITALS
WEIGHT: 172 LBS | BODY MASS INDEX: 26.07 KG/M2 | SYSTOLIC BLOOD PRESSURE: 136 MMHG | DIASTOLIC BLOOD PRESSURE: 70 MMHG | HEART RATE: 81 BPM | OXYGEN SATURATION: 98 % | HEIGHT: 68 IN

## 2019-08-16 DIAGNOSIS — E03.9 PRIMARY HYPOTHYROIDISM: ICD-10-CM

## 2019-08-16 DIAGNOSIS — E11.59 TYPE 2 DIABETES MELLITUS WITH OTHER CIRCULATORY COMPLICATION, WITH LONG-TERM CURRENT USE OF INSULIN (HCC): Primary | ICD-10-CM

## 2019-08-16 DIAGNOSIS — Z79.4 TYPE 2 DIABETES MELLITUS WITH OTHER CIRCULATORY COMPLICATION, WITH LONG-TERM CURRENT USE OF INSULIN (HCC): Primary | ICD-10-CM

## 2019-08-16 PROCEDURE — 99214 OFFICE O/P EST MOD 30 MIN: CPT | Performed by: INTERNAL MEDICINE

## 2019-08-16 RX ORDER — ATORVASTATIN CALCIUM 80 MG/1
80 TABLET, FILM COATED ORAL NIGHTLY
Qty: 30 TABLET | Refills: 11
Start: 2019-08-16 | End: 2020-10-12

## 2019-08-16 RX ORDER — LEVOTHYROXINE SODIUM 0.12 MG/1
125 TABLET ORAL DAILY
Qty: 30 TABLET | Refills: 11 | Status: SHIPPED | OUTPATIENT
Start: 2019-08-16 | End: 2019-08-19

## 2019-08-16 NOTE — PROGRESS NOTES
79 y.o.    Patient Care Team:  Napoleon Mead MD as PCP - General  Napoleon Mead MD as PCP - Family Medicine    Chief Complaint:    FOLLOW UP/ TYPE 2 DIABETES MELLITUS   Subjective     HPI    79-year old white female is here for the follow-up of type 2 diabetes mellitus, hypothyroidism.     Type 2 diabetes mellitus-diagnosed about 10 years ago, if not more.  Today in the clinic patient reports that she is on Toujeo 60 units at bedtime, Humalog sliding scale only, metformin thousand milligrams oral once a day and Victoza 1.8 mg subcutaneous daily.  She is still checking her blood sugars 2 times a day.  Average blood sugars are around 80 - 140.  She got her logbook for me to review.  Last eye examination was within the last 1 year, no history of diabetic retinopathy.  Does have history of diabetic neuropathy with tingling, burning sensation and numbness.  She is on Cymbalta which helps with her symptoms.  No known history of CAD, CKD, CVA.  On ARB    Hyperlipidemia  On Lipitor 80 mg oral daily.    Hypothyroidism on levothyroxine 125 mcg oral daily.    Reviewed primary care physician's/consulting physician documentation and lab results :     Interval History      The following portions of the patient's history were reviewed and updated as appropriate: allergies, current medications, past family history, past medical history, past social history, past surgical history and problem list.    Past Medical History:   Diagnosis Date   • Anxiety    • Arthritis    • Benign essential hypertension 08/20/2014   • 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CMS/[facility])    • Depression    • Diabetes (CMS/[facility])    • Diabetes mellitus, type 2 (CMS/[facility])    • Disc degeneration, lumbar    • Headache, tension-type    • Hyperlipidemia    • Hypothyroidism    • Neuropathy    • Osteoporosis 09/09/2015   • Peripheral neuropathy    • Rotator cuff tear, left    • Scoliosis    • Shoulder pain     LEFT, TORN ROTATOR CUFF S/P FALL   • Spinal stenosis      Family History    Problem Relation Age of Onset   • Bone cancer Mother    • No Known Problems Father      Social History     Socioeconomic History   • Marital status:      Spouse name: Not on file   • Number of children: Not on file   • Years of education: Not on file   • Highest education level: Not on file   Tobacco Use   • Smoking status: Former Smoker     Packs/day: 1.00     Last attempt to quit:      Years since quittin.6   • Smokeless tobacco: Never Used   Substance and Sexual Activity   • Alcohol use: No   • Drug use: No   • Sexual activity: Defer     Allergies   Allergen Reactions   • Sulfa Antibiotics        Current Outpatient Medications:   •  atorvastatin (LIPITOR) 80 MG tablet, Take 1 tablet by mouth Every Night., Disp: 30 tablet, Rfl: 11  •  bisoprolol (ZEBeta) 5 MG tablet, Take 1 tablet by mouth Daily., Disp: 90 tablet, Rfl: 1  •  DULoxetine (CYMBALTA) 60 MG capsule, Take 1 capsule by mouth Daily., Disp: 90 capsule, Rfl: 1  •  hydrochlorothiazide (HYDRODIURIL) 12.5 MG tablet, Take 1 tablet by mouth Daily., Disp: 90 tablet, Rfl: 1  •  Insulin Glargine (TOUJEO SOLOSTAR) 300 UNIT/ML solution pen-injector, Inject 60 Units under the skin into the appropriate area as directed every night at bedtime., Disp: 20 pen, Rfl: 3  •  Insulin Lispro (HUMALOG KWIKPEN) 100 UNIT/ML solution pen-injector, Inject 6 Units under the skin into the appropriate area as directed 4 (Three) Times a Day With Meals. Breakfast lunch dinner and snack, Disp: 10 pen, Rfl: 3  •  Insulin Pen Needle (RELION PEN NEEDLES) 32G X 4 MM misc, USE TO INJECT INSULIN 4 TIMES A DAY, Disp: 400 each, Rfl: 0  •  Insulin Pen Needle 32G X 4 MM misc, Use to inject insulin 4 TIMES DAILY, Disp: 200 each, Rfl: 0  •  levothyroxine (SYNTHROID, LEVOTHROID) 125 MCG tablet, Take 1 tablet by mouth Daily., Disp: 30 tablet, Rfl: 11  •  Liraglutide (VICTOZA) 18 MG/3ML solution pen-injector injection, Inject 1.8 mg under the skin into the appropriate area as directed  "Daily., Disp: 5 pen, Rfl: 3  •  metFORMIN (GLUCOPHAGE) 1000 MG tablet, Take 1 tablet by mouth Daily With Breakfast., Disp: 180 tablet, Rfl: 3  •  amlodipine-olmesartan (MYAH) 5-40 MG per tablet, Take 1 tablet by mouth Daily., Disp: 14 tablet, Rfl: 1  •  ketoconazole (NIZORAL) 2 % cream, , Disp: , Rfl:   •  lansoprazole (PREVACID) 30 MG capsule, Take 30 mg by mouth Daily., Disp: , Rfl:   •  SHINGRIX 50 MCG/0.5ML reconstituted suspension, ADMINISTER VACCINE PER PHYSICIAN PROTOCOL, Disp: , Rfl: 1        Review of Systems   Constitutional: Negative for appetite change, fatigue and fever.   Eyes: Negative for visual disturbance.   Respiratory: Negative for shortness of breath.    Cardiovascular: Negative for palpitations and leg swelling.   Gastrointestinal: Negative for abdominal pain and vomiting.   Endocrine: Negative for polydipsia and polyuria.   Musculoskeletal: Negative for joint swelling and neck pain.   Skin: Negative for rash.   Neurological: Negative for weakness and numbness.   Psychiatric/Behavioral: Negative for behavioral problems.       Objective       Vitals:    08/16/19 1339   BP: 136/70   Pulse: 81   SpO2: 98%   Weight: 78 kg (172 lb)   Height: 172.7 cm (68\")     Body mass index is 26.15 kg/m².      Physical Exam   Constitutional: She is oriented to person, place, and time. She appears well-nourished.   Obese     HENT:   Head: Normocephalic and atraumatic.   Wide neck   Eyes: Conjunctivae and EOM are normal. No scleral icterus.   Neck: Normal range of motion. Neck supple. No thyromegaly present.   Cardiovascular: Normal rate and normal heart sounds.   Pulmonary/Chest: Effort normal and breath sounds normal. No stridor. She has no wheezes.   Abdominal: Soft. Bowel sounds are normal. She exhibits no distension. There is no tenderness.   Central obesity   Musculoskeletal: She exhibits tenderness and deformity. She exhibits no edema.   Neurological: She is alert and oriented to person, place, and time. "   Skin: Skin is warm and dry. She is not diaphoretic.   Psychiatric: She has a normal mood and affect.   Vitals reviewed.    Results Review:     I reviewed the patient's new clinical results and mentioned them above in HPI and in plan as well.    Medical records reviewed  Summary: done      Orders Only on 05/21/2019   Component Date Value Ref Range Status   • Microalbumin, Urine 05/21/2019 15.0  Not Estab. ug/mL Final     Lab Results   Component Value Date    HGBA1C 5.90 (H) 05/15/2019    HGBA1C 8.1 (H) 02/14/2019    HGBA1C 11.75 (H) 12/05/2018     Lab Results   Component Value Date    MICROALBUR 15.0 05/21/2019    CREATININE 0.97 05/15/2019     Imaging Results (most recent)     None                Assessment and Plan:    Halie was seen today for diabetes.    Diagnoses and all orders for this visit:    Type 2 diabetes mellitus with other circulatory complication, with long-term current use of insulin (CMS/[facility])  -     Hemoglobin A1c  -     Basic Metabolic Panel  -     TSH  -     T4, Free  -     Vitamin B12 & Folate    Primary hypothyroidism  -     levothyroxine (SYNTHROID, LEVOTHROID) 125 MCG tablet; Take 1 tablet by mouth Daily.  -     Hemoglobin A1c  -     Basic Metabolic Panel  -     TSH  -     T4, Free  -     Vitamin B12 & Folate    Other orders  -     atorvastatin (LIPITOR) 80 MG tablet; Take 1 tablet by mouth Every Night.      Type 2 diabetes mellitus-uncontrolled  Check HbA1c  Decrease the dosage of Toujeo to 58 units at bedtime in order to prevent low blood sugar episodes.    Make changes to the insulin regimen further based on her blood work-up today    Hypothyroidism  Symptoms of was  Continue levothyroxine 125 mcg oral daily  Check thyroid panel    Hyperlipidemia  Continue Lipitor 80 mg oral daily    Patient also requested for driving clearance today in the office, she had a history of seizures secondary to her low blood sugar episode.  Explained to the patient that this clearance should come from the  "neurologist and I would not be able to give this clearance.    Reviewed Lab results with the patient.             Solomon Barrow MD  08/16/19    EMR Dragon / transcription disclaimer:     \"Dictated utilizing Dragon dictation\".  "

## 2019-08-17 LAB
BUN SERPL-MCNC: 22 MG/DL (ref 8–23)
BUN/CREAT SERPL: 25 (ref 7–25)
CALCIUM SERPL-MCNC: 9.6 MG/DL (ref 8.6–10.5)
CHLORIDE SERPL-SCNC: 100 MMOL/L (ref 98–107)
CO2 SERPL-SCNC: 24.1 MMOL/L (ref 22–29)
CREAT SERPL-MCNC: 0.88 MG/DL (ref 0.57–1)
FOLATE SERPL-MCNC: >20 NG/ML (ref 4.78–24.2)
GLUCOSE SERPL-MCNC: 130 MG/DL (ref 65–99)
HBA1C MFR BLD: 6 % (ref 4.8–5.6)
POTASSIUM SERPL-SCNC: 3.9 MMOL/L (ref 3.5–5.2)
SODIUM SERPL-SCNC: 139 MMOL/L (ref 136–145)
T4 FREE SERPL-MCNC: 1.85 NG/DL (ref 0.93–1.7)
TSH SERPL DL<=0.005 MIU/L-ACNC: 0.04 MIU/ML (ref 0.27–4.2)
VIT B12 SERPL-MCNC: 830 PG/ML (ref 211–946)

## 2019-08-19 DIAGNOSIS — E03.9 PRIMARY HYPOTHYROIDISM: ICD-10-CM

## 2019-08-19 RX ORDER — LEVOTHYROXINE SODIUM 0.1 MG/1
100 TABLET ORAL DAILY
Qty: 30 TABLET | Refills: 11 | Status: SHIPPED | OUTPATIENT
Start: 2019-08-19 | End: 2020-03-31 | Stop reason: SDUPTHER

## 2019-08-20 NOTE — PROGRESS NOTES
kennedi on 8/19/19, and 8/20/19  For patient to call the office back in regards to her lab results and medication change   Mailed lab results to patient and sent a [employer] message

## 2019-10-21 ENCOUNTER — TELEPHONE (OUTPATIENT)
Dept: FAMILY MEDICINE CLINIC | Facility: CLINIC | Age: 79
End: 2019-10-21

## 2019-11-19 RX ORDER — LIRAGLUTIDE 6 MG/ML
INJECTION SUBCUTANEOUS
Qty: 12 ML | Refills: 9 | Status: SHIPPED | OUTPATIENT
Start: 2019-11-19 | End: 2020-11-25 | Stop reason: SDUPTHER

## 2019-12-09 RX ORDER — LEVOTHYROXINE SODIUM 0.15 MG/1
TABLET ORAL
Qty: 90 TABLET | Refills: 4 | OUTPATIENT
Start: 2019-12-09

## 2019-12-11 DIAGNOSIS — I10 BENIGN ESSENTIAL HYPERTENSION: ICD-10-CM

## 2019-12-11 RX ORDER — HYDROCHLOROTHIAZIDE 12.5 MG/1
TABLET ORAL
Qty: 90 TABLET | Refills: 4 | OUTPATIENT
Start: 2019-12-11

## 2019-12-11 RX ORDER — AMLODIPINE AND OLMESARTAN MEDOXOMIL 5; 40 MG/1; MG/1
TABLET ORAL
Qty: 90 TABLET | Refills: 4 | OUTPATIENT
Start: 2019-12-11

## 2019-12-15 DIAGNOSIS — I10 BENIGN ESSENTIAL HYPERTENSION: ICD-10-CM

## 2019-12-16 RX ORDER — BISOPROLOL FUMARATE 5 MG/1
TABLET, FILM COATED ORAL
Qty: 90 TABLET | Refills: 4 | OUTPATIENT
Start: 2019-12-16

## 2020-01-16 ENCOUNTER — OFFICE VISIT (OUTPATIENT)
Dept: ENDOCRINOLOGY | Age: 80
End: 2020-01-16

## 2020-01-16 DIAGNOSIS — E78.2 MIXED HYPERLIPIDEMIA: ICD-10-CM

## 2020-01-16 DIAGNOSIS — Z79.4 TYPE 2 DIABETES MELLITUS WITH OTHER CIRCULATORY COMPLICATION, WITH LONG-TERM CURRENT USE OF INSULIN (HCC): Primary | ICD-10-CM

## 2020-01-16 DIAGNOSIS — E11.59 TYPE 2 DIABETES MELLITUS WITH OTHER CIRCULATORY COMPLICATION, WITH LONG-TERM CURRENT USE OF INSULIN (HCC): Primary | ICD-10-CM

## 2020-01-16 DIAGNOSIS — E03.9 PRIMARY HYPOTHYROIDISM: ICD-10-CM

## 2020-01-16 PROCEDURE — 99214 OFFICE O/P EST MOD 30 MIN: CPT | Performed by: INTERNAL MEDICINE

## 2020-01-16 RX ORDER — PREGABALIN 50 MG/1
50 CAPSULE ORAL 3 TIMES DAILY
Qty: 90 CAPSULE | Refills: 2 | Status: SHIPPED | OUTPATIENT
Start: 2020-01-16 | End: 2020-04-15

## 2020-01-17 LAB
BUN SERPL-MCNC: 21 MG/DL (ref 8–27)
BUN/CREAT SERPL: 18 (ref 12–28)
CALCIUM SERPL-MCNC: 9.9 MG/DL (ref 8.7–10.3)
CHLORIDE SERPL-SCNC: 102 MMOL/L (ref 96–106)
CHOLEST SERPL-MCNC: 107 MG/DL (ref 100–199)
CO2 SERPL-SCNC: 21 MMOL/L (ref 20–29)
CREAT SERPL-MCNC: 1.15 MG/DL (ref 0.57–1)
GLUCOSE SERPL-MCNC: 105 MG/DL (ref 65–99)
HBA1C MFR BLD: 6.7 % (ref 4.8–5.6)
HDLC SERPL-MCNC: 33 MG/DL
INTERPRETATION: NORMAL
INTERPRETATION: NORMAL
LDLC SERPL CALC-MCNC: 54 MG/DL (ref 0–99)
Lab: NORMAL
Lab: NORMAL
POTASSIUM SERPL-SCNC: 3.8 MMOL/L (ref 3.5–5.2)
SODIUM SERPL-SCNC: 142 MMOL/L (ref 134–144)
T4 FREE SERPL-MCNC: 1.95 NG/DL (ref 0.82–1.77)
TRIGL SERPL-MCNC: 101 MG/DL (ref 0–149)
TSH SERPL DL<=0.005 MIU/L-ACNC: 0.17 UIU/ML (ref 0.45–4.5)
VLDLC SERPL CALC-MCNC: 20 MG/DL (ref 5–40)

## 2020-01-25 ENCOUNTER — TELEPHONE (OUTPATIENT)
Dept: ENDOCRINOLOGY | Age: 80
End: 2020-01-25

## 2020-01-27 ENCOUNTER — OFFICE VISIT (OUTPATIENT)
Dept: FAMILY MEDICINE CLINIC | Facility: CLINIC | Age: 80
End: 2020-01-27

## 2020-01-27 VITALS
RESPIRATION RATE: 16 BRPM | WEIGHT: 170 LBS | TEMPERATURE: 97.8 F | HEART RATE: 74 BPM | SYSTOLIC BLOOD PRESSURE: 161 MMHG | HEIGHT: 68 IN | DIASTOLIC BLOOD PRESSURE: 82 MMHG | BODY MASS INDEX: 25.76 KG/M2

## 2020-01-27 DIAGNOSIS — M51.36 LUMBAR DEGENERATIVE DISC DISEASE: ICD-10-CM

## 2020-01-27 DIAGNOSIS — Z00.00 MEDICARE ANNUAL WELLNESS VISIT, SUBSEQUENT: Primary | ICD-10-CM

## 2020-01-27 DIAGNOSIS — M51.34 THORACIC DEGENERATIVE DISC DISEASE: ICD-10-CM

## 2020-01-27 DIAGNOSIS — I10 BENIGN ESSENTIAL HYPERTENSION: ICD-10-CM

## 2020-01-27 PROCEDURE — G0439 PPPS, SUBSEQ VISIT: HCPCS | Performed by: FAMILY MEDICINE

## 2020-01-27 PROCEDURE — 99214 OFFICE O/P EST MOD 30 MIN: CPT | Performed by: FAMILY MEDICINE

## 2020-01-27 RX ORDER — ALPRAZOLAM 0.5 MG/1
TABLET ORAL
Qty: 2 TABLET | Refills: 0 | Status: ON HOLD | OUTPATIENT
Start: 2020-01-27 | End: 2021-12-02

## 2020-01-27 RX ORDER — BISOPROLOL FUMARATE 5 MG/1
5 TABLET, FILM COATED ORAL
Qty: 90 TABLET | Refills: 1 | Status: SHIPPED | OUTPATIENT
Start: 2020-01-27 | End: 2020-11-25 | Stop reason: SDUPTHER

## 2020-01-27 RX ORDER — HYDROCHLOROTHIAZIDE 12.5 MG/1
12.5 TABLET ORAL DAILY
Qty: 90 TABLET | Refills: 1 | Status: SHIPPED | OUTPATIENT
Start: 2020-01-27 | End: 2020-11-25 | Stop reason: SDUPTHER

## 2020-01-27 NOTE — PATIENT INSTRUCTIONS
Medicare Wellness  Personal Prevention Plan of Service     Date of Office Visit:  2020  Encounter Provider:  Napoleon Mead MD  Place of Service:  [facility]  Patient Name: Halie Guidry  :  1940    As part of the Medicare Wellness portion of your visit today, we are providing you with this personalized preventive plan of services (PPPS). This plan is based upon recommendations of the [place] Preventive Services Task Force (USPSTF) and the Advisory Committee on Immunization Practices (ACIP).    This lists the preventive care services that should be considered, and provides dates of when you are due. Items listed as completed are up-to-date and do not require any further intervention.    Health Maintenance   Topic Date Due   • DIABETIC EYE EXAM  10/06/2016   • PNEUMOCOCCAL VACCINE (65+ HIGH RISK) (2 of 2 - PPSV23) 2017   • URINE MICROALBUMIN  2020   • HEMOGLOBIN A1C  2020   • LIPID PANEL  2021   • MEDICARE ANNUAL WELLNESS  2021   • COLONOSCOPY  2025   • TDAP/TD VACCINES (2 - Td) 2027   • INFLUENZA VACCINE  Completed   • ZOSTER VACCINE  Completed   • MAMMOGRAM  Discontinued   • DXA SCAN  Discontinued       No orders of the defined types were placed in this encounter.      Return in about 6 months (around 2020) for Recheck.

## 2020-01-27 NOTE — PROGRESS NOTES
Subjective   Halie Guidry is a 79 y.o. female.     History of Present Illness     Chief Complaint:   Chief Complaint   Patient presents with   • medicare wellness     diabetic eye exam due    • Hypertension     med refill    • right lower back pain     radiates down to groin area x  several months        Halie Guidry 79 y.o. female who presents today for Medical Management of the below listed issues and medication refills.  she has a problem list of   Patient Active Problem List   Diagnosis   • Compression fracture   • Lumbar degenerative disc disease   • Diabetes mellitus, type 2 (CMS/[facility])   • Hyperlipidemia   • Benign essential hypertension   • Primary hypothyroidism   • Leukocytosis   • Neuropathy   • Osteoporosis   • Proteinuria   • Tobacco abuse   • Diabetic eye exam (CMS/[facility])   • Generalized weakness   • Spinal stenosis   • Scoliosis   • Arthritis   • VBI (vertebrobasilar insufficiency)   • Orthostatic hypotension   • 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 (CMS/[facility])   • Severe hypothyroidism   • Noncompliance   • Vitamin D deficiency disease   • Altered mental status   • Tremor   • Seizure (CMS/[facility])   • Chronic kidney disease, stage 2 (mild)   • Thoracic degenerative disc disease   .  Since the last visit, she has overall felt well overall, although has had some increased issues (several months) of the lower back pain issues, along with some radiation of the pain down to the right knee. Last MRI showed prior L3 fracture (fall) and multip-level DDD. Some mid-thoracic spine pain, too. Saw pain management for some time but no longer. she has been compliant with   Current Outpatient Medications:   •  ALPRAZolam (XANAX) 0.5 MG tablet, Take 1 tab prior to the MRI and may repeat x 1 prn, Disp: 2 tablet, Rfl: 0  •  atorvastatin (LIPITOR) 80 MG tablet, Take 1 tablet by mouth Every Night., Disp: 30 tablet, Rfl: 11  •  bisoprolol (ZEBeta) 5 MG tablet, Take 1 tablet by mouth Daily., Disp: 90  "tablet, Rfl: 1  •  hydroCHLOROthiazide (HYDRODIURIL) 12.5 MG tablet, Take 1 tablet by mouth Daily., Disp: 90 tablet, Rfl: 1  •  Insulin Glargine (TOUJEO SOLOSTAR) 300 UNIT/ML solution pen-injector, Inject 60 Units under the skin into the appropriate area as directed every night at bedtime., Disp: 20 pen, Rfl: 3  •  Insulin Lispro (HUMALOG KWIKPEN) 100 UNIT/ML solution pen-injector, Inject 6 Units under the skin into the appropriate area as directed 4 (Three) Times a Day With Meals. Breakfast lunch dinner and snack, Disp: 10 pen, Rfl: 3  •  Insulin Pen Needle (RELION PEN NEEDLES) 32G X 4 MM misc, USE TO INJECT INSULIN 4 TIMES A DAY, Disp: 400 each, Rfl: 0  •  Insulin Pen Needle 32G X 4 MM misc, Use to inject insulin 4 TIMES DAILY, Disp: 200 each, Rfl: 0  •  ketoconazole (NIZORAL) 2 % cream, , Disp: , Rfl:   •  lansoprazole (PREVACID) 30 MG capsule, Take 30 mg by mouth Daily., Disp: , Rfl:   •  levothyroxine (SYNTHROID, LEVOTHROID) 100 MCG tablet, Take 1 tablet by mouth Daily., Disp: 30 tablet, Rfl: 11  •  metFORMIN (GLUCOPHAGE) 1000 MG tablet, Take 1 tablet by mouth Daily With Breakfast., Disp: 180 tablet, Rfl: 0  •  pregabalin (LYRICA) 50 MG capsule, Take 1 capsule by mouth 3 (Three) Times a Day for 90 days., Disp: 90 capsule, Rfl: 2  •  SHINGRIX 50 MCG/0.5ML reconstituted suspension, ADMINISTER VACCINE PER PHYSICIAN PROTOCOL, Disp: , Rfl: 1  •  VICTOZA 18 MG/3ML solution pen-injector injection, INJECT 1.8 MG UNDER THE SKIN INTO THE APPROPRIATE AREA AS DIRECTED DAILY, Disp: 12 mL, Rfl: 9.  she denies medication side effects.    All of the other chronic condition(s) listed above are stable w/o issues.    /82   Pulse 74   Temp 97.8 °F (36.6 °C) (Oral)   Resp 16   Ht 172.7 cm (68\")   Wt 77.1 kg (170 lb)   BMI 25.85 kg/m²     Results for orders placed or performed in visit on 01/16/20   Basic Metabolic Panel   Result Value Ref Range    Glucose 105 (H) 65 - 99 mg/dL    BUN 21 8 - 27 mg/dL    Creatinine 1.15 " (H) 0.57 - 1.00 mg/dL    eGFR Non African Am 45 (L) >59 mL/min/1.73    eGFR African Am 52 (L) >59 mL/min/1.73    BUN/Creatinine Ratio 18 12 - 28    Sodium 142 134 - 144 mmol/L    Potassium 3.8 3.5 - 5.2 mmol/L    Chloride 102 96 - 106 mmol/L    Total CO2 21 20 - 29 mmol/L    Calcium 9.9 8.7 - 10.3 mg/dL   Hemoglobin A1c   Result Value Ref Range    Hemoglobin A1C 6.7 (H) 4.8 - 5.6 %   Lipid Panel   Result Value Ref Range    Total Cholesterol 107 100 - 199 mg/dL    Triglycerides 101 0 - 149 mg/dL    HDL Cholesterol 33 (L) >39 mg/dL    VLDL Cholesterol 20 5 - 40 mg/dL    LDL Cholesterol  54 0 - 99 mg/dL   TSH   Result Value Ref Range    TSH 0.174 (L) 0.450 - 4.500 uIU/mL   T4, Free   Result Value Ref Range    Free T4 1.95 (H) 0.82 - 1.77 ng/dL   Cardiovascular Risk Assessment   Result Value Ref Range    Interpretation Note     PDF Image Not applicable    [facility] CKD Program   Result Value Ref Range    Interpretation Note    Diabetes Patient Education   Result Value Ref Range    PDF Image Not applicable      Pt sees endocrine and hematology and is UTD.      The following portions of the patient's history were reviewed and updated as appropriate: allergies, current medications, past family history, past medical history, past social history, past surgical history and problem list.    Review of Systems   Constitutional: Negative for activity change, chills, fatigue and fever.   Respiratory: Negative for cough and chest tightness.    Cardiovascular: Negative for chest pain and palpitations.   Gastrointestinal: Negative for abdominal pain and nausea.   Endocrine: Negative for cold intolerance.   Musculoskeletal: Positive for back pain.   Neurological: Negative for weakness and numbness.   Psychiatric/Behavioral: Negative for behavioral problems and dysphoric mood.       Objective   Physical Exam   Constitutional: She appears well-developed and well-nourished.   Neck: Neck supple. No thyromegaly present.   Cardiovascular:  Normal rate and regular rhythm.   No murmur heard.  Pulmonary/Chest: Effort normal and breath sounds normal.   Abdominal: Bowel sounds are normal. There is no tenderness.   Neurological: She is alert.   Psychiatric: She has a normal mood and affect. Her behavior is normal.   Nursing note and vitals reviewed.  Labs reviewed with pt today during visit. All questions answered.  Review of MRI from 2017 shows multi-level DDD with prior L3 fracture and indeterminate lesion of T7. This was found when pt was in the hospital and, per hospital notes, was thought to be a hemangioma.    Assessment/Plan   Halie was seen today for medicare wellness, hypertension and right lower back pain.    Diagnoses and all orders for this visit:    Medicare annual wellness visit, subsequent    Benign essential hypertension  -     bisoprolol (ZEBeta) 5 MG tablet; Take 1 tablet by mouth Daily.  -     hydroCHLOROthiazide (HYDRODIURIL) 12.5 MG tablet; Take 1 tablet by mouth Daily.    Lumbar degenerative disc disease  -     MRI Lumbar Spine Without Contrast; Future    Thoracic degenerative disc disease  -     MRI thoracic spine wo contrast; Future    Other orders  -     ALPRAZolam (XANAX) 0.5 MG tablet; Take 1 tab prior to the MRI and may repeat x 1 prn    Pt has a [facility] eye MD and is encouraged to go see them.

## 2020-01-27 NOTE — PROGRESS NOTES
The ABCs of the Annual Wellness Visit  Subsequent Medicare Wellness Visit    Chief Complaint   Patient presents with   • medicare wellness     diabetic eye exam due    • Hypertension     med refill    • right lower back pain     radiates down to groin area x  several months        Subjective   History of Present Illness:  Halie Guidry is a 79 y.o. female who presents for a Subsequent Medicare Wellness Visit.    HEALTH RISK ASSESSMENT    Recent Hospitalizations:  No hospitalization(s) within the last year.    Current Medical Providers:  Patient Care Team:  Napoleon Mead MD as PCP - General  Napoleon Mead MD as PCP - Family Medicine    Smoking Status:  Social History     Tobacco Use   Smoking Status Former Smoker   • Packs/day: 1.00   • Last attempt to quit:    • Years since quittin.0   Smokeless Tobacco Never Used       Alcohol Consumption:  Social History     Substance and Sexual Activity   Alcohol Use No       Depression Screen:   PHQ-2/PHQ-9 Depression Screening 2020   Little interest or pleasure in doing things 0   Feeling down, depressed, or hopeless 0   Trouble falling or staying asleep, or sleeping too much -   Feeling tired or having little energy -   Poor appetite or overeating -   Feeling bad about yourself - or that you are a failure or have let yourself or your family down -   Trouble concentrating on things, such as reading the newspaper or watching television -   Moving or speaking so slowly that other people could have noticed. Or the opposite - being so fidgety or restless that you have been moving around a lot more than usual -   Thoughts that you would be better off dead, or of hurting yourself in some way -   Total Score 0   If you checked off any problems, how difficult have these problems made it for you to do your work, take care of things at home, or get along with other people? -       Fall Risk Screen:  STEADI Fall Risk Assessment has not been completed.    Health  Habits and Functional and Cognitive Screening:  Functional & Cognitive Status 1/27/2020   Do you have difficulty preparing food and eating? No   Do you have difficulty bathing yourself, getting dressed or grooming yourself? No   Do you have difficulty using the toilet? No   Do you have difficulty moving around from place to place? No   Do you have trouble with steps or getting out of a bed or a chair? No   Current Diet Well Balanced Diet   Dental Exam Up to date   Eye Exam Not up to date   Exercise (times per week) 0 times per week   Current Exercise Activities Include None   Do you need help using the phone?  No   Are you deaf or do you have serious difficulty hearing?  No   Do you need help with transportation? Yes   Do you need help shopping? No   Do you need help preparing meals?  No   Do you need help with housework?  No   Do you need help with laundry? No   Do you need help taking your medications? No   Do you need help managing money? No   Do you ever drive or ride in a car without wearing a seat belt? No   Have you felt unusual stress, anger or loneliness in the last month? No   Who do you live with? Other   If you need help, do you have trouble finding someone available to you? Yes   Have you been bothered in the last four weeks by sexual problems? No   Do you have difficulty concentrating, remembering or making decisions? No         Does the patient have evidence of cognitive impairment? No    Asprin use counseling:Taking ASA appropriately as indicated    Age-appropriate Screening Schedule:  Refer to the list below for future screening recommendations based on patient's age, sex and/or medical conditions. Orders for these recommended tests are listed in the plan section. The patient has been provided with a written plan.    Health Maintenance   Topic Date Due   • DIABETIC EYE EXAM  10/06/2016   • PNEUMOCOCCAL VACCINE (65+ HIGH RISK) (2 of 2 - PPSV23) 06/24/2017   • URINE MICROALBUMIN  05/21/2020   •  HEMOGLOBIN A1C  07/16/2020   • LIPID PANEL  01/16/2021   • COLONOSCOPY  06/05/2025   • TDAP/TD VACCINES (2 - Td) 04/29/2027   • INFLUENZA VACCINE  Completed   • ZOSTER VACCINE  Completed   • MAMMOGRAM  Discontinued   • DXA SCAN  Discontinued          The following portions of the patient's history were reviewed and updated as appropriate: allergies, current medications, past family history, past medical history, past social history, past surgical history and problem list.    Outpatient Medications Prior to Visit   Medication Sig Dispense Refill   • bisoprolol (ZEBeta) 5 MG tablet Take 1 tablet by mouth Daily. 90 tablet 1   • hydrochlorothiazide (HYDRODIURIL) 12.5 MG tablet Take 1 tablet by mouth Daily. 90 tablet 1   • atorvastatin (LIPITOR) 80 MG tablet Take 1 tablet by mouth Every Night. 30 tablet 11   • Insulin Glargine (TOUJEO SOLOSTAR) 300 UNIT/ML solution pen-injector Inject 60 Units under the skin into the appropriate area as directed every night at bedtime. 20 pen 3   • Insulin Lispro (HUMALOG KWIKPEN) 100 UNIT/ML solution pen-injector Inject 6 Units under the skin into the appropriate area as directed 4 (Three) Times a Day With Meals. Breakfast lunch dinner and snack 10 pen 3   • Insulin Pen Needle (RELION PEN NEEDLES) 32G X 4 MM misc USE TO INJECT INSULIN 4 TIMES A  each 0   • Insulin Pen Needle 32G X 4 MM misc Use to inject insulin 4 TIMES DAILY 200 each 0   • ketoconazole (NIZORAL) 2 % cream      • lansoprazole (PREVACID) 30 MG capsule Take 30 mg by mouth Daily.     • levothyroxine (SYNTHROID, LEVOTHROID) 100 MCG tablet Take 1 tablet by mouth Daily. 30 tablet 11   • metFORMIN (GLUCOPHAGE) 1000 MG tablet Take 1 tablet by mouth Daily With Breakfast. 180 tablet 0   • pregabalin (LYRICA) 50 MG capsule Take 1 capsule by mouth 3 (Three) Times a Day for 90 days. 90 capsule 2   • SHINGRIX 50 MCG/0.5ML reconstituted suspension ADMINISTER VACCINE PER PHYSICIAN PROTOCOL  1   • VICTOZA 18 MG/3ML solution  "pen-injector injection INJECT 1.8 MG UNDER THE SKIN INTO THE APPROPRIATE AREA AS DIRECTED DAILY 12 mL 9     No facility-administered medications prior to visit.        Patient Active Problem List   Diagnosis   • Compression fracture   • Lumbar degenerative disc disease   • Diabetes mellitus, type 2 (CMS/HCC)   • Hyperlipidemia   • Benign essential hypertension   • Primary hypothyroidism   • Leukocytosis   • Neuropathy   • Osteoporosis   • Proteinuria   • Tobacco abuse   • Diabetic eye exam (CMS/[facility])   • Generalized weakness   • Spinal stenosis   • Scoliosis   • Arthritis   • VBI (vertebrobasilar insufficiency)   • Orthostatic hypotension   • 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 (CMS/[facility])   • Severe hypothyroidism   • Noncompliance   • Vitamin D deficiency disease   • Altered mental status   • Tremor   • Seizure (CMS/[facility])   • Chronic kidney disease, stage 2 (mild)   • Thoracic degenerative disc disease       Advanced Care Planning:  Patient has an advance directive - a copy has been provided and is visible in patient header    Review of Systems    Compared to one year ago, the patient feels her physical health is the same.  Compared to one year ago, the patient feels her mental health is the same.    Reviewed chart for potential of high risk medication in the elderly: yes  Reviewed chart for potential of harmful drug interactions in the elderly:yes    Objective         Vitals:    01/27/20 1508   BP: 161/82   Pulse: 74   Resp: 16   Temp: 97.8 °F (36.6 °C)   TempSrc: Oral   Weight: 77.1 kg (170 lb)   Height: 172.7 cm (68\")       Body mass index is 25.85 kg/m².  Discussed the patient's BMI with her. The BMI is above average; BMI management plan is completed.    Physical Exam    Lab Results   Component Value Date     (H) 01/16/2020    CHLPL 107 01/16/2020    TRIG 101 01/16/2020    HDL 33 (L) 01/16/2020    LDL 54 01/16/2020    VLDL 20 01/16/2020    HGBA1C 6.7 (H) 01/16/2020        Assessment/Plan "   Medicare Risks and Personalized Health Plan  CMS Preventative Services Quick Reference  Cardiovascular risk    The above risks/problems have been discussed with the patient.  Pertinent information has been shared with the patient in the After Visit Summary.  Follow up plans and orders are seen below in the Assessment/Plan Section.    Diagnoses and all orders for this visit:    1. Medicare annual wellness visit, subsequent (Primary)    2. Benign essential hypertension  -     bisoprolol (ZEBeta) 5 MG tablet; Take 1 tablet by mouth Daily.  Dispense: 90 tablet; Refill: 1  -     hydroCHLOROthiazide (HYDRODIURIL) 12.5 MG tablet; Take 1 tablet by mouth Daily.  Dispense: 90 tablet; Refill: 1    3. Lumbar degenerative disc disease  -     MRI Lumbar Spine Without Contrast; Future    4. Thoracic degenerative disc disease  -     MRI thoracic spine wo contrast; Future    Other orders  -     Cancel: XR Spine Lumbar 2 or 3 View  -     ALPRAZolam (XANAX) 0.5 MG tablet; Take 1 tab prior to the MRI and may repeat x 1 prn  Dispense: 2 tablet; Refill: 0      Follow Up:  Return in about 6 months (around 7/27/2020) for Recheck.     An After Visit Summary and PPPS were given to the patient.

## 2020-02-11 ENCOUNTER — TELEPHONE (OUTPATIENT)
Dept: FAMILY MEDICINE CLINIC | Facility: CLINIC | Age: 80
End: 2020-02-11

## 2020-02-11 DIAGNOSIS — M51.34 THORACIC DEGENERATIVE DISC DISEASE: Primary | ICD-10-CM

## 2020-02-11 DIAGNOSIS — M51.36 LUMBAR DEGENERATIVE DISC DISEASE: ICD-10-CM

## 2020-03-17 RX ORDER — LEVOTHYROXINE SODIUM 0.15 MG/1
TABLET ORAL
Qty: 90 TABLET | Refills: 3 | OUTPATIENT
Start: 2020-03-17

## 2020-03-31 DIAGNOSIS — E03.9 PRIMARY HYPOTHYROIDISM: ICD-10-CM

## 2020-03-31 RX ORDER — LEVOTHYROXINE SODIUM 88 UG/1
88 TABLET ORAL DAILY
Qty: 90 TABLET | Refills: 1 | Status: SHIPPED | OUTPATIENT
Start: 2020-03-31 | End: 2020-06-23 | Stop reason: SDUPTHER

## 2020-04-20 ENCOUNTER — TELEPHONE (OUTPATIENT)
Dept: ENDOCRINOLOGY | Age: 80
End: 2020-04-20

## 2020-04-20 NOTE — TELEPHONE ENCOUNTER
Spoke with patient about medication change      ----- Message from Aguilar Srivastava sent at 4/20/2020  3:48 PM EDT -----  Contact: TABATHA CARTER  Pt would like a call back regarding one of her medications. She states that she left a message regarding this last week. Pt can be contacted at [phone].

## 2020-06-18 ENCOUNTER — OFFICE VISIT (OUTPATIENT)
Dept: ENDOCRINOLOGY | Age: 80
End: 2020-06-18

## 2020-06-18 VITALS
WEIGHT: 153 LBS | HEIGHT: 66 IN | OXYGEN SATURATION: 98 % | SYSTOLIC BLOOD PRESSURE: 138 MMHG | HEART RATE: 89 BPM | BODY MASS INDEX: 24.59 KG/M2 | DIASTOLIC BLOOD PRESSURE: 70 MMHG

## 2020-06-18 DIAGNOSIS — Z79.4 LONG-TERM INSULIN USE (HCC): ICD-10-CM

## 2020-06-18 DIAGNOSIS — IMO0002 DM (DIABETES MELLITUS), TYPE 2, UNCONTROLLED W/NEUROLOGIC COMPLICATION: Primary | ICD-10-CM

## 2020-06-18 DIAGNOSIS — E03.9 ACQUIRED HYPOTHYROIDISM: ICD-10-CM

## 2020-06-18 PROCEDURE — 99214 OFFICE O/P EST MOD 30 MIN: CPT | Performed by: INTERNAL MEDICINE

## 2020-06-18 RX ORDER — HYDROCODONE BITARTRATE AND ACETAMINOPHEN 10; 325 MG/1; MG/1
1 TABLET ORAL EVERY 8 HOURS PRN
COMMUNITY
Start: 2020-04-22 | End: 2021-12-07 | Stop reason: HOSPADM

## 2020-06-18 NOTE — PROGRESS NOTES
80 y.o.    Patient Care Team:  Napoleon Mead MD as PCP - General  Napoleon Mead MD as PCP - Family Medicine    Chief Complaint:    Follow up/ type 2 dm   Subjective     HPI    80-year old white female is here for the follow-up of type 2 diabetes mellitus, hypothyroidism.     Type 2 diabetes mellitus-diagnosed about 10 years ago, if not more.  Today in clinic patient reports that she is on Toujeo 60 units at bedtime, Humalog sliding scale only for blood sugar is greater than 150, metformin thousand milligrams once a day.  Also on Victoza.  She checks her blood sugars 2 times a day.  She was not able to give me information on her blood sugar numbers.  She did not bring her glucometer for me to review.  No history of diabetic retinopathy.  Due for her eye examination.  Does have worsening diabetic neuropathy, on Cymbalta.  History of CAD, CKD, CVA.  On ARB     Hyperlipidemia  On Lipitor 80 mg oral daily.     Hypothyroidism on levothyroxine 88 mcg oral daily.    Patient could not give much information, she either states that she forgot how she does not remember.  Also reports that she has been fasting for the appointment and blood work-up today which has her happened when she used to go to the doctors and as a result she is not able to think straight.  Blood sugar here in the office was 110      Reviewed primary care physician's/consulting physician documentation and lab results :     Interval History      The following portions of the patient's history were reviewed and updated as appropriate: allergies, current medications, past family history, past medical history, past social history, past surgical history and problem list.    Past Medical History:   Diagnosis Date   • Anxiety    • Arthritis    • Benign essential hypertension 08/20/2014   • 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CMS/HCC)    • Depression    • Diabetes (CMS/HCC)    • Diabetes mellitus, type 2 (CMS/HCC)    • Disc degeneration, lumbar    • Headache, tension-type    •  Hyperlipidemia    • Hypothyroidism    • Neuropathy    • Osteoporosis 2015   • Peripheral neuropathy    • Rotator cuff tear, left    • Scoliosis    • Shoulder pain     LEFT, TORN ROTATOR CUFF S/P FALL   • Spinal stenosis      Family History   Problem Relation Age of Onset   • Bone cancer Mother    • No Known Problems Father      Social History     Socioeconomic History   • Marital status:      Spouse name: Not on file   • Number of children: Not on file   • Years of education: Not on file   • Highest education level: Not on file   Tobacco Use   • Smoking status: Former Smoker     Packs/day: 1.00     Last attempt to quit:      Years since quittin.4   • Smokeless tobacco: Never Used   Substance and Sexual Activity   • Alcohol use: No   • Drug use: No   • Sexual activity: Defer     Allergies   Allergen Reactions   • Sulfa Antibiotics        Current Outpatient Medications:   •  ALPRAZolam (XANAX) 0.5 MG tablet, Take 1 tab prior to the MRI and may repeat x 1 prn, Disp: 2 tablet, Rfl: 0  •  atorvastatin (LIPITOR) 80 MG tablet, Take 1 tablet by mouth Every Night., Disp: 30 tablet, Rfl: 11  •  bisoprolol (ZEBeta) 5 MG tablet, Take 1 tablet by mouth Daily., Disp: 90 tablet, Rfl: 1  •  hydroCHLOROthiazide (HYDRODIURIL) 12.5 MG tablet, Take 1 tablet by mouth Daily., Disp: 90 tablet, Rfl: 1  •  HYDROcodone-acetaminophen (NORCO)  MG per tablet, TK 1 T PO TID, Disp: , Rfl:   •  Insulin Glargine (TOUJEO SOLOSTAR) 300 UNIT/ML solution pen-injector, Inject 60 Units under the skin into the appropriate area as directed every night at bedtime., Disp: 20 pen, Rfl: 3  •  Insulin Lispro (HUMALOG KWIKPEN) 100 UNIT/ML solution pen-injector, Inject 6 Units under the skin into the appropriate area as directed 4 (Three) Times a Day With Meals. Breakfast lunch dinner and snack, Disp: 10 pen, Rfl: 3  •  Insulin Pen Needle (ReliOn Pen Needles) 32G X 4 MM misc, USE TO INJECT INSULIN 4 TIMES A DAY, Disp: 400 each, Rfl:  "3  •  Insulin Pen Needle 32G X 4 MM misc, Use to inject insulin 4 TIMES DAILY, Disp: 200 each, Rfl: 0  •  ketoconazole (NIZORAL) 2 % cream, , Disp: , Rfl:   •  lansoprazole (PREVACID) 30 MG capsule, Take 30 mg by mouth Daily., Disp: , Rfl:   •  levothyroxine (SYNTHROID, LEVOTHROID) 88 MCG tablet, Take 1 tablet by mouth Daily., Disp: 90 tablet, Rfl: 1  •  metFORMIN (GLUCOPHAGE) 1000 MG tablet, Take 1 tablet by mouth Daily With Breakfast., Disp: 180 tablet, Rfl: 0  •  SHINGRIX 50 MCG/0.5ML reconstituted suspension, ADMINISTER VACCINE PER PHYSICIAN PROTOCOL, Disp: , Rfl: 1  •  VICTOZA 18 MG/3ML solution pen-injector injection, INJECT 1.8 MG UNDER THE SKIN INTO THE APPROPRIATE AREA AS DIRECTED DAILY, Disp: 12 mL, Rfl: 9        Review of Systems   Constitutional: Positive for appetite change and fatigue. Negative for fever.   Eyes: Negative for visual disturbance.   Respiratory: Negative for shortness of breath.    Cardiovascular: Negative for palpitations and leg swelling.   Gastrointestinal: Negative for abdominal pain and vomiting.   Endocrine: Negative for polydipsia and polyuria.   Musculoskeletal: Negative for joint swelling and neck pain.   Skin: Negative for rash.   Neurological: Negative for weakness and numbness.   Psychiatric/Behavioral: Negative for behavioral problems.     I have reviewed the ROS as documented by the MA; Solomon Barrow MD.      Objective       Vitals:    06/18/20 1345   BP: 138/70   Pulse: 89   SpO2: 98%   Weight: 69.4 kg (153 lb)   Height: 167.6 cm (66\")     Body mass index is 24.69 kg/m².      Physical Exam   Constitutional: She is oriented to person, place, and time. She appears well-nourished.   Obese     HENT:   Head: Normocephalic and atraumatic.   Wide neck   Eyes: Conjunctivae and EOM are normal. No scleral icterus.   Neck: Normal range of motion. Neck supple. No thyromegaly present.   Acanthosis nigricans   Cardiovascular: Normal rate and normal heart sounds.   Pulmonary/Chest: " Effort normal and breath sounds normal. No stridor. She has no wheezes.   Abdominal: Soft. Bowel sounds are normal. She exhibits no distension. There is no tenderness.   Central obesity   Musculoskeletal: She exhibits no edema or tenderness.   Neurological: She is alert and oriented to person, place, and time.   Skin: Skin is warm and dry. She is not diaphoretic.   Psychiatric: She has a normal mood and affect.   Vitals reviewed.      Results Review:     I reviewed the patient's new clinical results and mentioned them above in HPI and in plan as well.    Medical records reviewed  Summary:Done      Office Visit on 01/16/2020   Component Date Value Ref Range Status   • Glucose 01/16/2020 105* 65 - 99 mg/dL Final   • BUN 01/16/2020 21  8 - 27 mg/dL Final   • Creatinine 01/16/2020 1.15* 0.57 - 1.00 mg/dL Final   • eGFR Non African Am 01/16/2020 45* >59 mL/min/1.73 Final   • eGFR African Am 01/16/2020 52* >59 mL/min/1.73 Final   • BUN/Creatinine Ratio 01/16/2020 18  12 - 28 Final   • Sodium 01/16/2020 142  134 - 144 mmol/L Final   • Potassium 01/16/2020 3.8  3.5 - 5.2 mmol/L Final   • Chloride 01/16/2020 102  96 - 106 mmol/L Final   • Total CO2 01/16/2020 21  20 - 29 mmol/L Final   • Calcium 01/16/2020 9.9  8.7 - 10.3 mg/dL Final   • Hemoglobin A1C 01/16/2020 6.7* 4.8 - 5.6 % Final    Comment:          Prediabetes: 5.7 - 6.4           Diabetes: >6.4           Glycemic control for adults with diabetes: <7.0     • Total Cholesterol 01/16/2020 107  100 - 199 mg/dL Final   • Triglycerides 01/16/2020 101  0 - 149 mg/dL Final   • HDL Cholesterol 01/16/2020 33* >39 mg/dL Final   • VLDL Cholesterol 01/16/2020 20  5 - 40 mg/dL Final   • LDL Cholesterol  01/16/2020 54  0 - 99 mg/dL Final   • TSH 01/16/2020 0.174* 0.450 - 4.500 uIU/mL Final   • Free T4 01/16/2020 1.95* 0.82 - 1.77 ng/dL Final   • Interpretation 01/16/2020 Note   Final    Supplemental report is available.   • PDF Image 01/16/2020 Not applicable   Final   •  Interpretation 01/16/2020 Note   Final    Comment: -------------------------------  CHRONIC KIDNEY DISEASE:  EGFR, BLOOD PRESSURE, AND PROTEINURIA ASSESSMENT  Patient's eGFR was previously outside the scope of the  program and now is 45 mL/min/1.73mE2 corresponding to CKD  stage 3a. Multiply eGFR by 1.159 if patient is   American. Potassium is within goal and has not changed  significantly, was 3.9 and now is 3.8 mmol/L. Glycemic  control (HB A1c: 6.7 %) is within goal.  EGFR, BLOOD PRESSURE, AND PROTEINURIA TREATMENT SUGGESTIONS  -  The current eGFR is significantly below the expected value  and has decreased, was 62 and now is 45 mL/min/1.73mE2.  Renal artery stenosis, obstruction, volume depletion,  NSAIDs, ACEI or ARB, or other medications and contrast  agents are common causes of a fall in eGFR. Guidelines  recommend a target blood pressure of 140/90 mmHg or less in  CKD patients to reduce cardiovascular risk and CKD  progression. A higher blood pressure target may be  appropriate in older individuals to avoid symptomatic                             hypotension. Assessment of albuminuria (urine  albumin:creatinine ratio or urine protein:creatinine ratio  preferred) is recommended at least annually in CKD patients  for staging and disease prognosis.  EGFR, BLOOD PRESSURE, AND PROTEINURIA FOLLOW-UP  -  fasting Renal Panel within 1 week; Spot Urine Panel is  recommended by KDOQI guidelines, at least yearly; Hemoglobin  A1C within 6 months;  -  BONE and MINERAL ASSESSMENT  Calcium is within goal and has not changed significantly,  was 9.6 and now is 9.9 mg/dL. Carbon Dioxide is below goal  and has decreased, was 24 and now is 21 mmol/L. Guidelines  recommend the measurement of 25-hydroxy vitamin D in  patients with CKD.  BONE and MINERAL TREATMENT SUGGESTIONS  -  Interpretations require simultaneous measurements of serum  calcium and phosphorus. If not on alkali, begin sodium  bicarbonate, one 650 mg pill  2-3 times daily, otherwise  increase dose.  BONE and MINERAL FOLLOW-UP  -  fasting Renal Panel within 1 week; fasting PTH with Ryanne                           l  Panel and 25-Hydroxy Vitamin D are recommended by KDOQI  guidelines, at least yearly;  -  LIPIDS ASSESSMENT  Blood was non-fasting; interpret these results with caution.  LDL-C is optimal, 54 mg/dL. Triglyceride is normal, 101  mg/dL. Non-HDL Cholesterol is optimal, 74 mg/dL. HDL-C is  low, 33 mg/dL.  LIPIDS TREATMENT SUGGESTIONS  -  Therapeutic lifestyle changes are always valuable to  maintain optimal blood lipid status (diet, exercise, weight  management). Continue statin if in use. Consider measurement  of LDL particle number or Apo B to adjudicate need for  further LDL lowering therapy. If statin cannot be tolerated  or increased, alternatives include use of an intestinal  agent (ezetimibe or bile acid sequestrant) or niacin.  LIPIDS FOLLOW-UP  -  fasting Lipid Panel within 12 months;  -  ANEMIA ASSESSMENT  Most recent order does not include a CBC Panel or iron  studies.  ANEMIA FOLLOW-UP  -  CBC is recommended by KDOQI guidelines, at least yearly;  ----------------------------                           ---  DISCLAIMER  These assessments and treatment suggestions are provided as  a convenience in support of the physician-patient  relationship and are not intended to replace the physician's  clinical judgment. They are derived from national guidelines  in addition to other evidence and expert opinion. The  clinician should consider this information within the  context of clinical opinion and the individual patient.  SEE GUIDANCE FOR CHRONIC KIDNEY DISEASE PROGRAM: Kidney  Disease Improving Global Outcomes (KDIGO) clinical practice  guidelines are at http://kdigo.org/home/guidelines/.  National Kid[employer] Kidney Disease Outcomes Quality  Initiative (KDOQI (TM)), with its limitations and  disclaimers, are at www.kidney.org/professionals/KDOQI.  "This  program is intended for patients who have been diagnosed  with stages 3, 4, or pre-dialysis 5 CKD. It is not intended  for children, pregnant patients, or transplant patients.     • PDF Image 01/16/2020 Not applicable   Final     Lab Results   Component Value Date    HGBA1C 6.7 (H) 01/16/2020    HGBA1C 6.00 (H) 08/16/2019    HGBA1C 5.90 (H) 05/15/2019     Lab Results   Component Value Date    MICROALBUR 15.0 05/21/2019    CREATININE 1.15 (H) 01/16/2020     Imaging Results (Most Recent)     None                Assessment and Plan:    Halie was seen today for diabetes.    Diagnoses and all orders for this visit:    DM (diabetes mellitus), type 2, uncontrolled w/neurologic complication (CMS/HCC)  -     Hemoglobin A1c  -     Creatinine, Serum  -     eGFR-Glomerular Filtration  -     Lipid Panel  -     Microalbumin / Creatinine Urine Ratio - Urine, Clean Catch  -     TSH  -     Vitamin B12 & Folate  -     T4, Free    Long-term insulin use (CMS/[facility])  -     Hemoglobin A1c  -     Creatinine, Serum  -     eGFR-Glomerular Filtration  -     Lipid Panel  -     Microalbumin / Creatinine Urine Ratio - Urine, Clean Catch  -     TSH  -     Vitamin B12 & Folate  -     T4, Free    Acquired hypothyroidism  -     Hemoglobin A1c  -     Creatinine, Serum  -     eGFR-Glomerular Filtration  -     Lipid Panel  -     Microalbumin / Creatinine Urine Ratio - Urine, Clean Catch  -     TSH  -     Vitamin B12 & Folate  -     T4, Free        Type II DM-uncontrolled  Check HbA1c  Continue the current medications  Make adjustments based on the blood work-up    Hypothyroidism  Check thyroid panel    Hyperlipidemia  Continue Lipitor 80 mg oral daily.    Reviewed Lab results with the patient.       Solomon Barrow MD  06/18/20    EMR Dragon / transcription disclaimer:     \"Dictated utilizing Dragon dictation\".      "

## 2020-06-19 LAB
CHOLEST SERPL-MCNC: 119 MG/DL (ref 100–199)
CREAT SERPL-MCNC: 1.04 MG/DL (ref 0.57–1)
FOLATE SERPL-MCNC: >20 NG/ML
HBA1C MFR BLD: 5.9 % (ref 4.8–5.6)
HDLC SERPL-MCNC: 41 MG/DL
INTERPRETATION: NORMAL
INTERPRETATION: NORMAL
LDLC SERPL CALC-MCNC: 59 MG/DL (ref 0–99)
Lab: NORMAL
Lab: NORMAL
T4 FREE SERPL-MCNC: 0.9 NG/DL (ref 0.82–1.77)
TRIGL SERPL-MCNC: 97 MG/DL (ref 0–149)
TSH SERPL DL<=0.005 MIU/L-ACNC: 35.4 UIU/ML (ref 0.45–4.5)
UNABLE TO VOID: NORMAL
VIT B12 SERPL-MCNC: 601 PG/ML (ref 232–1245)
VLDLC SERPL CALC-MCNC: 19 MG/DL (ref 5–40)

## 2020-06-23 DIAGNOSIS — E03.9 PRIMARY HYPOTHYROIDISM: ICD-10-CM

## 2020-06-23 RX ORDER — LEVOTHYROXINE SODIUM 112 UG/1
112 TABLET ORAL DAILY
Qty: 90 TABLET | Refills: 2 | Status: SHIPPED | OUTPATIENT
Start: 2020-06-23 | End: 2021-06-18 | Stop reason: SDUPTHER

## 2020-07-01 RX ORDER — INSULIN GLARGINE 300 U/ML
INJECTION, SOLUTION SUBCUTANEOUS
Qty: 27 ML | Refills: 1 | Status: SHIPPED | OUTPATIENT
Start: 2020-07-01 | End: 2021-04-05

## 2020-09-14 DIAGNOSIS — I10 BENIGN ESSENTIAL HYPERTENSION: ICD-10-CM

## 2020-09-14 RX ORDER — BISOPROLOL FUMARATE 5 MG/1
TABLET, FILM COATED ORAL
Qty: 90 TABLET | Refills: 3 | OUTPATIENT
Start: 2020-09-14

## 2020-09-14 RX ORDER — HYDROCHLOROTHIAZIDE 12.5 MG/1
TABLET ORAL
Qty: 90 TABLET | Refills: 3 | OUTPATIENT
Start: 2020-09-14

## 2020-10-12 RX ORDER — ATORVASTATIN CALCIUM 80 MG/1
TABLET, FILM COATED ORAL
Qty: 90 TABLET | Refills: 3 | Status: SHIPPED | OUTPATIENT
Start: 2020-10-12 | End: 2021-10-05

## 2020-10-13 NOTE — TELEPHONE ENCOUNTER
Verbal approved by Dr Barrow on 10/09/20 was given to call in the prescription for Lyrica 50 mg tid

## 2020-10-19 RX ORDER — PREGABALIN 50 MG/1
50 CAPSULE ORAL 3 TIMES DAILY
Qty: 90 CAPSULE | Refills: 2
Start: 2020-10-19 | End: 2021-02-03 | Stop reason: SDUPTHER

## 2020-11-25 DIAGNOSIS — I10 BENIGN ESSENTIAL HYPERTENSION: ICD-10-CM

## 2020-11-25 RX ORDER — BISOPROLOL FUMARATE 5 MG/1
5 TABLET, FILM COATED ORAL
Qty: 30 TABLET | Refills: 0 | Status: SHIPPED | OUTPATIENT
Start: 2020-11-25 | End: 2020-11-30 | Stop reason: SDUPTHER

## 2020-11-25 RX ORDER — HYDROCHLOROTHIAZIDE 12.5 MG/1
12.5 TABLET ORAL DAILY
Qty: 30 TABLET | Refills: 0 | Status: SHIPPED | OUTPATIENT
Start: 2020-11-25 | End: 2020-11-30 | Stop reason: SDUPTHER

## 2020-11-30 ENCOUNTER — OFFICE VISIT (OUTPATIENT)
Dept: FAMILY MEDICINE CLINIC | Facility: CLINIC | Age: 80
End: 2020-11-30

## 2020-11-30 VITALS
SYSTOLIC BLOOD PRESSURE: 150 MMHG | RESPIRATION RATE: 16 BRPM | TEMPERATURE: 97.2 F | HEART RATE: 83 BPM | WEIGHT: 188 LBS | HEIGHT: 66 IN | DIASTOLIC BLOOD PRESSURE: 102 MMHG | BODY MASS INDEX: 30.22 KG/M2

## 2020-11-30 DIAGNOSIS — I10 BENIGN ESSENTIAL HYPERTENSION: Primary | ICD-10-CM

## 2020-11-30 DIAGNOSIS — Z23 IMMUNIZATION DUE: ICD-10-CM

## 2020-11-30 PROCEDURE — 90694 VACC AIIV4 NO PRSRV 0.5ML IM: CPT | Performed by: FAMILY MEDICINE

## 2020-11-30 PROCEDURE — 99213 OFFICE O/P EST LOW 20 MIN: CPT | Performed by: FAMILY MEDICINE

## 2020-11-30 PROCEDURE — G0008 ADMIN INFLUENZA VIRUS VAC: HCPCS | Performed by: FAMILY MEDICINE

## 2020-11-30 RX ORDER — HYDROCHLOROTHIAZIDE 12.5 MG/1
12.5 TABLET ORAL DAILY
Qty: 90 TABLET | Refills: 1 | Status: SHIPPED | OUTPATIENT
Start: 2020-11-30 | End: 2021-05-17

## 2020-11-30 RX ORDER — BISOPROLOL FUMARATE 5 MG/1
5 TABLET, FILM COATED ORAL
Qty: 90 TABLET | Refills: 1 | Status: SHIPPED | OUTPATIENT
Start: 2020-11-30 | End: 2021-05-17

## 2020-11-30 RX ORDER — LIRAGLUTIDE 6 MG/ML
1.8 INJECTION SUBCUTANEOUS DAILY
Qty: 10 PEN | Refills: 0 | Status: SHIPPED | OUTPATIENT
Start: 2020-11-30 | End: 2020-11-30

## 2020-11-30 RX ORDER — LISINOPRIL 5 MG/1
5 TABLET ORAL DAILY
Qty: 90 TABLET | Refills: 1 | Status: SHIPPED | OUTPATIENT
Start: 2020-11-30 | End: 2021-05-17

## 2020-11-30 NOTE — PROGRESS NOTES
Subjective   Halie Guidry is a 80 y.o. female.     History of Present Illness     Chief Complaint:   Chief Complaint   Patient presents with   • Diarrhea   • Hypertension       Halie Guidry 80 y.o. female who presents today for Medical Management of the below listed issues and medication refills.  she has a problem list of   Patient Active Problem List   Diagnosis   • Compression fracture   • Lumbar degenerative disc disease   • Diabetes mellitus, type 2 (CMS/HCC)   • Hyperlipidemia   • Benign essential hypertension   • Primary hypothyroidism   • Leukocytosis   • Neuropathy   • Osteoporosis   • Proteinuria   • Tobacco abuse   • Diabetic eye exam (CMS/HCC)   • Generalized weakness   • Spinal stenosis   • Scoliosis   • Arthritis   • VBI (vertebrobasilar insufficiency)   • Orthostatic hypotension   • 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 (CMS/HCC)   • Severe hypothyroidism   • Noncompliance   • Vitamin D deficiency disease   • Altered mental status   • Tremor   • Seizure (CMS/[facility])   • Chronic kidney disease, stage 2 (mild)   • Thoracic degenerative disc disease   .  Since the last visit, she has overall felt well.  she has been compliant with   Current Outpatient Medications:   •  ALPRAZolam (XANAX) 0.5 MG tablet, Take 1 tab prior to the MRI and may repeat x 1 prn, Disp: 2 tablet, Rfl: 0  •  atorvastatin (LIPITOR) 80 MG tablet, TAKE 1 TABLET EVERY NIGHT, Disp: 90 tablet, Rfl: 3  •  bisoprolol (ZEBeta) 5 MG tablet, Take 1 tablet by mouth Daily., Disp: 90 tablet, Rfl: 1  •  hydroCHLOROthiazide (HYDRODIURIL) 12.5 MG tablet, Take 1 tablet by mouth Daily., Disp: 90 tablet, Rfl: 1  •  HYDROcodone-acetaminophen (NORCO)  MG per tablet, TK 1 T PO TID, Disp: , Rfl:   •  Insulin Glargine (TOUJEO SOLOSTAR) 300 UNIT/ML solution pen-injector, Inject 60 Units under the skin into the appropriate area as directed every night at bedtime., Disp: 20 pen, Rfl: 3  •  Insulin Lispro (HUMALOG KWIKPEN) 100  "UNIT/ML solution pen-injector, Inject 6 Units under the skin into the appropriate area as directed 4 (Three) Times a Day With Meals. Breakfast lunch dinner and snack, Disp: 10 pen, Rfl: 3  •  Insulin Pen Needle (ReliOn Pen Needles) 32G X 4 MM misc, USE TO INJECT INSULIN 4 TIMES A DAY, Disp: 400 each, Rfl: 3  •  Insulin Pen Needle 32G X 4 MM misc, Use to inject insulin 4 TIMES DAILY, Disp: 200 each, Rfl: 0  •  ketoconazole (NIZORAL) 2 % cream, , Disp: , Rfl:   •  lansoprazole (PREVACID) 30 MG capsule, Take 30 mg by mouth Daily., Disp: , Rfl:   •  levothyroxine (SYNTHROID, LEVOTHROID) 112 MCG tablet, Take 1 tablet by mouth Daily., Disp: 90 tablet, Rfl: 2  •  Liraglutide (Victoza) 18 MG/3ML solution pen-injector injection, Inject 1.8 mg under the skin into the appropriate area as directed Daily., Disp: 10 pen, Rfl: 0  •  lisinopril (PRINIVIL,ZESTRIL) 5 MG tablet, Take 1 tablet by mouth Daily., Disp: 90 tablet, Rfl: 1  •  metFORMIN (GLUCOPHAGE) 1000 MG tablet, Take 1 tablet by mouth Daily With Breakfast., Disp: 180 tablet, Rfl: 0  •  pregabalin (Lyrica) 50 MG capsule, Take 1 capsule by mouth 3 (Three) Times a Day., Disp: 90 capsule, Rfl: 2  •  SHINGRIX 50 MCG/0.5ML reconstituted suspension, ADMINISTER VACCINE PER PHYSICIAN PROTOCOL, Disp: , Rfl: 1  •  TOUJEO SOLOSTAR 300 UNIT/ML solution pen-injector injection, INJECT 60 UNITS UNDER THE SKIN INTO THE APPROPRIATE AREA AS DIRECTED EVERY NIGHT AT BEDTIME, Disp: 27 mL, Rfl: 1.  she denies medication side effects.    All of the other chronic condition(s) listed above are stable w/o issues.    BP (!) 150/102   Pulse 83   Temp 97.2 °F (36.2 °C)   Resp 16   Ht 167.6 cm (65.98\")   Wt 85.3 kg (188 lb)   BMI 30.36 kg/m²     Results for orders placed or performed in visit on 06/18/20   Hemoglobin A1c    Specimen: Blood   Result Value Ref Range    Hemoglobin A1C 5.9 (H) 4.8 - 5.6 %   eGFR-Glomerular Filtration    Specimen: Blood   Result Value Ref Range    Creatinine 1.04 (H) " 0.57 - 1.00 mg/dL    eGFR Non African Am 51 (L) >59 mL/min/1.73    eGFR African Am 59 (L) >59 mL/min/1.73   Lipid Panel    Specimen: Blood   Result Value Ref Range    Total Cholesterol 119 100 - 199 mg/dL    Triglycerides 97 0 - 149 mg/dL    HDL Cholesterol 41 >39 mg/dL    VLDL Cholesterol 19 5 - 40 mg/dL    LDL Cholesterol  59 0 - 99 mg/dL   TSH    Specimen: Blood   Result Value Ref Range    TSH 35.400 (H) 0.450 - 4.500 uIU/mL   Vitamin B12 & Folate    Specimen: Blood   Result Value Ref Range    Vitamin B-12 601 232 - 1,245 pg/mL    Folate >20.0 >3.0 ng/mL   T4, Free    Specimen: Blood   Result Value Ref Range    Free T4 0.90 0.82 - 1.77 ng/dL   Cardiovascular Risk Assessment   Result Value Ref Range    Interpretation Note     PDF Image Not applicable    [facility] CKD Program   Result Value Ref Range    Interpretation Note    Diabetes Patient Education   Result Value Ref Range    PDF Image Not applicable    Unable To Void   Result Value Ref Range    Unable to Void Comment            The following portions of the patient's history were reviewed and updated as appropriate: allergies, current medications, past family history, past medical history, past social history, past surgical history and problem list.    Review of Systems   Constitutional: Negative for activity change, chills and fever.   Respiratory: Negative for cough.    Cardiovascular: Negative for chest pain.   Psychiatric/Behavioral: Negative for dysphoric mood.       Objective   Physical Exam  Constitutional:       General: She is not in acute distress.     Appearance: She is well-developed.   Cardiovascular:      Rate and Rhythm: Normal rate and regular rhythm.   Pulmonary:      Effort: Pulmonary effort is normal.      Breath sounds: Normal breath sounds.   Neurological:      Mental Status: She is alert and oriented to person, place, and time.   Psychiatric:         Behavior: Behavior normal.         Thought Content: Thought content normal.     Labs from  endocrine reviewed with pt at today's visit.    Assessment/Plan   Diagnoses and all orders for this visit:    1. Benign essential hypertension (Primary)  Comments:  worsening  Orders:  -     bisoprolol (ZEBeta) 5 MG tablet; Take 1 tablet by mouth Daily.  Dispense: 90 tablet; Refill: 1  -     hydroCHLOROthiazide (HYDRODIURIL) 12.5 MG tablet; Take 1 tablet by mouth Daily.  Dispense: 90 tablet; Refill: 1  -     lisinopril (PRINIVIL,ZESTRIL) 5 MG tablet; Take 1 tablet by mouth Daily.  Dispense: 90 tablet; Refill: 1    2. Immunization due  -     Fluad Quad >65 years

## 2020-12-01 RX ORDER — LIRAGLUTIDE 6 MG/ML
1.8 INJECTION SUBCUTANEOUS DAILY
Qty: 15 PEN | Refills: 0 | Status: SHIPPED | OUTPATIENT
Start: 2020-12-01 | End: 2021-03-25

## 2020-12-15 ENCOUNTER — TELEPHONE (OUTPATIENT)
Dept: FAMILY MEDICINE CLINIC | Facility: CLINIC | Age: 80
End: 2020-12-15

## 2020-12-15 DIAGNOSIS — R19.7 DIARRHEA, UNSPECIFIED TYPE: Primary | ICD-10-CM

## 2020-12-15 NOTE — TELEPHONE ENCOUNTER
PT WAS TOLD TO CALL IN 2 WEEKS. PT HAS STOPPED METFORMIN X 2 WEEKS . PT STILL HAVING DIARRHEA. PLEASE ADVISE

## 2020-12-16 NOTE — TELEPHONE ENCOUNTER
Get pt appt with GI to look into further. Have her restart the Metformin as stopping it didn't help.

## 2021-01-18 ENCOUNTER — OFFICE VISIT (OUTPATIENT)
Dept: GASTROENTEROLOGY | Facility: CLINIC | Age: 81
End: 2021-01-18

## 2021-01-18 VITALS — HEIGHT: 68 IN | BODY MASS INDEX: 29.1 KG/M2 | WEIGHT: 192 LBS | TEMPERATURE: 96.5 F

## 2021-01-18 DIAGNOSIS — K59.1 FUNCTIONAL DIARRHEA: ICD-10-CM

## 2021-01-18 DIAGNOSIS — K58.0 IRRITABLE BOWEL SYNDROME WITH DIARRHEA: Primary | ICD-10-CM

## 2021-01-18 DIAGNOSIS — A04.9 BACTERIAL INTESTINAL INFECTION, UNSPECIFIED: ICD-10-CM

## 2021-01-18 DIAGNOSIS — Z79.4 DIABETES MELLITUS DUE TO UNDERLYING CONDITION WITH DIABETIC AMYOTROPHY, WITH LONG-TERM CURRENT USE OF INSULIN (HCC): ICD-10-CM

## 2021-01-18 DIAGNOSIS — R11.0 NAUSEA: ICD-10-CM

## 2021-01-18 DIAGNOSIS — E08.44 DIABETES MELLITUS DUE TO UNDERLYING CONDITION WITH DIABETIC AMYOTROPHY, WITH LONG-TERM CURRENT USE OF INSULIN (HCC): ICD-10-CM

## 2021-01-18 DIAGNOSIS — R10.30 LOWER ABDOMINAL PAIN: ICD-10-CM

## 2021-01-18 DIAGNOSIS — R14.0 ABDOMINAL BLOATING: ICD-10-CM

## 2021-01-18 PROCEDURE — 99203 OFFICE O/P NEW LOW 30 MIN: CPT | Performed by: INTERNAL MEDICINE

## 2021-01-18 NOTE — PROGRESS NOTES
Chief Complaint   Patient presents with   • Diarrhea     Subjective     HPI  Halie Guidry is a 80 y.o. female who presents today for new office visit    Patient complains of diarrhea intermittent for many years  No weight loss  She will go days without diarrhea and then she will have a trigger foods such as dairy or spicy food and she has diarrhea  Her last colonoscopy was greater than 10 years ago  She has occasional abdominal bloating  No abdominal pain  No nausea vomiting    Objective   Vitals:    01/18/21 1336   Temp: 96.5 °F (35.8 °C)       Physical Exam  Vitals signs and nursing note reviewed.   Constitutional:       Appearance: She is well-developed.   HENT:      Head: Normocephalic.   Eyes:      Pupils: Pupils are equal, round, and reactive to light.   Cardiovascular:      Rate and Rhythm: Normal rate and regular rhythm.      Heart sounds: Normal heart sounds.   Pulmonary:      Effort: Pulmonary effort is normal.      Breath sounds: Normal breath sounds.   Abdominal:      General: Bowel sounds are normal. There is no distension or abdominal bruit.      Palpations: Abdomen is soft. Abdomen is not rigid. There is no shifting dullness, fluid wave or mass.      Tenderness: There is no abdominal tenderness. There is no guarding. Negative signs include Rodriguez's sign.      Hernia: No hernia is present. There is no hernia in the ventral area.   Genitourinary:     Rectum: Normal. No mass, tenderness, anal fissure, external hemorrhoid or internal hemorrhoid.   Musculoskeletal: Normal range of motion.   Skin:     General: Skin is dry.   Neurological:      Mental Status: She is alert.   Psychiatric:         Behavior: Behavior normal.         Thought Content: Thought content normal.         Judgment: Judgment normal.       The following data was reviewed by: Josse Oropeza MD on 01/18/2021:  CMP    CMP 6/18/20   Creatinine 1.04 (A)   eGFR Non  Am 51 (A)   eGFR  Am 59 (A)   (A) Abnormal value               Data reviewed: Reviewed primary care data     Assessment/Plan   Assessment:     Diarrhea  Abdominal bloating    Plan:     Schedule stool studies GI PCR and CBC, CMP, TSH  If stool study and blood test unrevealing for source of diarrhea plan for colonoscopy with biopsies  Continue Pepto as needed    I spent 20 minutes caring for Halie on this date of service. This time includes time spent by me in the following activities:preparing for the visit, reviewing tests, obtaining and/or reviewing a separately obtained history, performing a medically appropriate examination and/or evaluation , counseling and educating the patient/family/caregiver, ordering medications, tests, or procedures and independently interpreting results and communicating that information with the patient/family/caregiver    Josse Oropeza MD  [facility]  [address] [place] [address]  Office: [phone]

## 2021-01-19 LAB
ALBUMIN SERPL-MCNC: 4 G/DL (ref 3.7–4.7)
ALBUMIN/GLOB SERPL: 1.2 {RATIO} (ref 1.2–2.2)
ALP SERPL-CCNC: 107 IU/L (ref 39–117)
ALT SERPL-CCNC: 7 IU/L (ref 0–32)
AST SERPL-CCNC: 12 IU/L (ref 0–40)
BASOPHILS # BLD AUTO: 0.1 X10E3/UL (ref 0–0.2)
BASOPHILS NFR BLD AUTO: 1 %
BILIRUB SERPL-MCNC: 0.4 MG/DL (ref 0–1.2)
BUN SERPL-MCNC: 21 MG/DL (ref 8–27)
BUN/CREAT SERPL: 18 (ref 12–28)
CALCIUM SERPL-MCNC: 9.1 MG/DL (ref 8.7–10.3)
CHLORIDE SERPL-SCNC: 104 MMOL/L (ref 96–106)
CO2 SERPL-SCNC: 21 MMOL/L (ref 20–29)
CREAT SERPL-MCNC: 1.19 MG/DL (ref 0.57–1)
EOSINOPHIL # BLD AUTO: 0.6 X10E3/UL (ref 0–0.4)
EOSINOPHIL NFR BLD AUTO: 6 %
ERYTHROCYTE [DISTWIDTH] IN BLOOD BY AUTOMATED COUNT: 14 % (ref 11.7–15.4)
GLOBULIN SER CALC-MCNC: 3.3 G/DL (ref 1.5–4.5)
GLUCOSE SERPL-MCNC: 139 MG/DL (ref 65–99)
HCT VFR BLD AUTO: 39 % (ref 34–46.6)
HGB BLD-MCNC: 12.9 G/DL (ref 11.1–15.9)
IMM GRANULOCYTES # BLD AUTO: 0 X10E3/UL (ref 0–0.1)
IMM GRANULOCYTES NFR BLD AUTO: 0 %
LYMPHOCYTES # BLD AUTO: 1.7 X10E3/UL (ref 0.7–3.1)
LYMPHOCYTES NFR BLD AUTO: 18 %
MCH RBC QN AUTO: 28.1 PG (ref 26.6–33)
MCHC RBC AUTO-ENTMCNC: 33.1 G/DL (ref 31.5–35.7)
MCV RBC AUTO: 85 FL (ref 79–97)
MONOCYTES # BLD AUTO: 0.6 X10E3/UL (ref 0.1–0.9)
MONOCYTES NFR BLD AUTO: 7 %
NEUTROPHILS # BLD AUTO: 6.3 X10E3/UL (ref 1.4–7)
NEUTROPHILS NFR BLD AUTO: 68 %
PLATELET # BLD AUTO: 331 X10E3/UL (ref 150–450)
POTASSIUM SERPL-SCNC: 4.1 MMOL/L (ref 3.5–5.2)
PROT SERPL-MCNC: 7.3 G/DL (ref 6–8.5)
RBC # BLD AUTO: 4.59 X10E6/UL (ref 3.77–5.28)
SODIUM SERPL-SCNC: 143 MMOL/L (ref 134–144)
TSH SERPL DL<=0.005 MIU/L-ACNC: 0.39 UIU/ML (ref 0.45–4.5)
WBC # BLD AUTO: 9.4 X10E3/UL (ref 3.4–10.8)

## 2021-01-20 ENCOUNTER — TELEPHONE (OUTPATIENT)
Dept: GASTROENTEROLOGY | Facility: CLINIC | Age: 81
End: 2021-01-20

## 2021-01-20 DIAGNOSIS — R89.9 ABNORMAL LABORATORY TEST: ICD-10-CM

## 2021-01-20 DIAGNOSIS — E03.9 HYPOTHYROIDISM, UNSPECIFIED TYPE: Primary | ICD-10-CM

## 2021-01-20 NOTE — TELEPHONE ENCOUNTER
----- Message from Josse Oropeza MD sent at 1/19/2021  4:52 PM EST -----  TSH is low she needs a T3 and T4 please order  Awaiting stool studies

## 2021-01-21 NOTE — TELEPHONE ENCOUNTER
Patient called. No answer left message(VM identified patient) advising of Dr. Oropeza's note. Advised to call back to schedule a lab appointment and/or for questions.

## 2021-02-03 ENCOUNTER — TELEPHONE (OUTPATIENT)
Dept: ENDOCRINOLOGY | Age: 81
End: 2021-02-03

## 2021-02-03 NOTE — TELEPHONE ENCOUNTER
Patient is kuriti patient and needs a script for her lyrica sent in  To [facility]   Pt only has a few more left  90 day supply  3 x daily

## 2021-02-04 LAB
ADV 40+41 DNA STL QL NAA+NON-PROBE: NOT DETECTED
ASTRO TYP 1-8 RNA STL QL NAA+NON-PROBE: NOT DETECTED
C CAYETANENSIS DNA STL QL NAA+NON-PROBE: NOT DETECTED
C COLI+JEJ+UPSA DNA STL QL NAA+NON-PROBE: NOT DETECTED
C DIF TOX TCDA+TCDB STL QL NAA+NON-PROBE: NOT DETECTED
CRYPTOSP DNA STL QL NAA+NON-PROBE: NOT DETECTED
E COLI O157 DNA STL QL NAA+NON-PROBE: NORMAL
E HISTOLYT DNA STL QL NAA+NON-PROBE: NOT DETECTED
EAEC PAA PLAS AGGR+AATA ST NAA+NON-PRB: NOT DETECTED
EC STX1+STX2 GENES STL QL NAA+NON-PROBE: NOT DETECTED
EPEC EAE GENE STL QL NAA+NON-PROBE: NOT DETECTED
ETEC LTA+ST1A+ST1B TOX ST NAA+NON-PROBE: NOT DETECTED
G LAMBLIA DNA STL QL NAA+NON-PROBE: NOT DETECTED
NOROVIRUS GI+II RNA STL QL NAA+NON-PROBE: NOT DETECTED
P SHIGELLOIDES DNA STL QL NAA+NON-PROBE: NOT DETECTED
RVA RNA STL QL NAA+NON-PROBE: NOT DETECTED
S ENT+BONG DNA STL QL NAA+NON-PROBE: NOT DETECTED
SAPO I+II+IV+V RNA STL QL NAA+NON-PROBE: NOT DETECTED
SHIGELLA SP+EIEC IPAH ST NAA+NON-PROBE: NOT DETECTED
V CHOL+PARA+VUL DNA STL QL NAA+NON-PROBE: NOT DETECTED
V CHOLERAE DNA STL QL NAA+NON-PROBE: NOT DETECTED
Y ENTEROCOL DNA STL QL NAA+NON-PROBE: NOT DETECTED

## 2021-02-04 RX ORDER — PREGABALIN 50 MG/1
50 CAPSULE ORAL 3 TIMES DAILY
Qty: 90 CAPSULE | Refills: 1
Start: 2021-02-04 | End: 2021-02-09

## 2021-02-09 RX ORDER — PREGABALIN 50 MG/1
CAPSULE ORAL
Qty: 90 CAPSULE | Refills: 0 | Status: SHIPPED | OUTPATIENT
Start: 2021-02-09 | End: 2021-03-25

## 2021-02-26 ENCOUNTER — TELEPHONE (OUTPATIENT)
Dept: GASTROENTEROLOGY | Facility: CLINIC | Age: 81
End: 2021-02-26

## 2021-02-26 DIAGNOSIS — K58.0 IRRITABLE BOWEL SYNDROME WITH DIARRHEA: Primary | ICD-10-CM

## 2021-03-02 DIAGNOSIS — Z23 IMMUNIZATION DUE: ICD-10-CM

## 2021-03-15 NOTE — TELEPHONE ENCOUNTER
Patient called. No answer. Left message VM identified patient.   Advised to call back for questions.

## 2021-03-20 DIAGNOSIS — Z79.4 TYPE 2 DIABETES MELLITUS WITH OTHER CIRCULATORY COMPLICATION, WITH LONG-TERM CURRENT USE OF INSULIN (HCC): Primary | ICD-10-CM

## 2021-03-20 DIAGNOSIS — E11.59 TYPE 2 DIABETES MELLITUS WITH OTHER CIRCULATORY COMPLICATION, WITH LONG-TERM CURRENT USE OF INSULIN (HCC): Primary | ICD-10-CM

## 2021-03-25 RX ORDER — LIRAGLUTIDE 6 MG/ML
INJECTION SUBCUTANEOUS
Qty: 45 ML | Refills: 1 | Status: SHIPPED | OUTPATIENT
Start: 2021-03-25 | End: 2021-10-18

## 2021-03-25 RX ORDER — PREGABALIN 50 MG/1
CAPSULE ORAL
Qty: 90 CAPSULE | Refills: 0 | Status: SHIPPED | OUTPATIENT
Start: 2021-03-25 | End: 2021-05-04 | Stop reason: SDUPTHER

## 2021-04-05 RX ORDER — INSULIN GLARGINE 300 U/ML
INJECTION, SOLUTION SUBCUTANEOUS
Qty: 27 ML | Refills: 1 | Status: SHIPPED | OUTPATIENT
Start: 2021-04-05 | End: 2022-03-04 | Stop reason: HOSPADM

## 2021-05-03 DIAGNOSIS — E11.59 TYPE 2 DIABETES MELLITUS WITH OTHER CIRCULATORY COMPLICATION, WITH LONG-TERM CURRENT USE OF INSULIN (HCC): ICD-10-CM

## 2021-05-03 DIAGNOSIS — Z79.4 TYPE 2 DIABETES MELLITUS WITH OTHER CIRCULATORY COMPLICATION, WITH LONG-TERM CURRENT USE OF INSULIN (HCC): ICD-10-CM

## 2021-05-03 NOTE — TELEPHONE ENCOUNTER
Needs script for pregabalin 50 mg  lyrica    Send to   [address] - [address] [facility] - [phone]  - [phone] FX Phone:  [phone]   Fax:  [phone]        90 day supply

## 2021-05-04 RX ORDER — PREGABALIN 50 MG/1
50 CAPSULE ORAL 3 TIMES DAILY
Qty: 270 CAPSULE | Refills: 0 | Status: SHIPPED | OUTPATIENT
Start: 2021-05-04 | End: 2021-05-17

## 2021-05-08 DIAGNOSIS — Z79.4 TYPE 2 DIABETES MELLITUS WITH OTHER CIRCULATORY COMPLICATION, WITH LONG-TERM CURRENT USE OF INSULIN (HCC): ICD-10-CM

## 2021-05-08 DIAGNOSIS — E11.59 TYPE 2 DIABETES MELLITUS WITH OTHER CIRCULATORY COMPLICATION, WITH LONG-TERM CURRENT USE OF INSULIN (HCC): ICD-10-CM

## 2021-05-15 DIAGNOSIS — I10 BENIGN ESSENTIAL HYPERTENSION: ICD-10-CM

## 2021-05-17 RX ORDER — LISINOPRIL 5 MG/1
TABLET ORAL
Qty: 30 TABLET | Refills: 0 | Status: SHIPPED | OUTPATIENT
Start: 2021-05-17 | End: 2021-06-18 | Stop reason: SDUPTHER

## 2021-05-17 RX ORDER — PREGABALIN 50 MG/1
CAPSULE ORAL
Qty: 90 CAPSULE | Refills: 0 | Status: SHIPPED | OUTPATIENT
Start: 2021-05-17 | End: 2021-09-28

## 2021-05-17 RX ORDER — HYDROCHLOROTHIAZIDE 12.5 MG/1
TABLET ORAL
Qty: 30 TABLET | Refills: 0 | Status: SHIPPED | OUTPATIENT
Start: 2021-05-17 | End: 2021-09-07 | Stop reason: SDUPTHER

## 2021-05-17 RX ORDER — BISOPROLOL FUMARATE 5 MG/1
TABLET, FILM COATED ORAL
Qty: 30 TABLET | Refills: 0 | Status: SHIPPED | OUTPATIENT
Start: 2021-05-17 | End: 2021-09-07 | Stop reason: SDUPTHER

## 2021-06-08 ENCOUNTER — OFFICE VISIT (OUTPATIENT)
Dept: ENDOCRINOLOGY | Age: 81
End: 2021-06-08

## 2021-06-08 VITALS
HEART RATE: 70 BPM | BODY MASS INDEX: 26.98 KG/M2 | HEIGHT: 68 IN | OXYGEN SATURATION: 96 % | WEIGHT: 178 LBS | SYSTOLIC BLOOD PRESSURE: 140 MMHG | DIASTOLIC BLOOD PRESSURE: 80 MMHG

## 2021-06-08 DIAGNOSIS — Z79.4 TYPE 2 DIABETES MELLITUS WITH OTHER CIRCULATORY COMPLICATION, WITH LONG-TERM CURRENT USE OF INSULIN (HCC): Primary | ICD-10-CM

## 2021-06-08 DIAGNOSIS — E11.59 TYPE 2 DIABETES MELLITUS WITH OTHER CIRCULATORY COMPLICATION, WITH LONG-TERM CURRENT USE OF INSULIN (HCC): Primary | ICD-10-CM

## 2021-06-08 DIAGNOSIS — E03.9 ACQUIRED HYPOTHYROIDISM: ICD-10-CM

## 2021-06-08 DIAGNOSIS — Z79.4 TYPE 2 DIABETES MELLITUS WITH HYPERGLYCEMIA, WITH LONG-TERM CURRENT USE OF INSULIN (HCC): ICD-10-CM

## 2021-06-08 DIAGNOSIS — E03.9 PRIMARY HYPOTHYROIDISM: ICD-10-CM

## 2021-06-08 DIAGNOSIS — E78.2 MIXED HYPERLIPIDEMIA: ICD-10-CM

## 2021-06-08 DIAGNOSIS — E11.65 TYPE 2 DIABETES MELLITUS WITH HYPERGLYCEMIA, WITH LONG-TERM CURRENT USE OF INSULIN (HCC): ICD-10-CM

## 2021-06-08 PROCEDURE — 99214 OFFICE O/P EST MOD 30 MIN: CPT | Performed by: INTERNAL MEDICINE

## 2021-06-08 NOTE — PROGRESS NOTES
"Chief Complaint  Chief Complaint   Patient presents with   • Diabetes   FOLLOW UP/ TYPE 2 DM        Subjective          History of Present Illness    Halie Guidry 80 y.o. presents with Type 2 dm as a F/u patient.     Type 2 dm - Diagnosed about 10+ years ago.   Today in clinic pt reports being on toujeo 60 units at bed time, novolog ssi if bg is > 150, on victoza 1.8 mg subq daily.   Not on metformin  Avg BG - 110 - 120 mg/dl.   Checks BG - 2 times a day  Sensor - x  Dm retinopathy - x ,Last eye exam - due for exam  Dm nephropathy - yes  Dm neuropathy - yes,Dm neuropathy meds - n/a  CAD - x  CVA -x  Episodes of hypoglycemia - occasional   Pt is physically active. weight has been stable.   Pt tries to follow DM diet for most part.   On Ace inb.    Hyperlipidemia  On Lipitor 80 mg oral daily.     Hypothyroidism on levothyroxine 112 mcg oral daily.    Patient was able to give the information about her insulin regimen but was not entirely clear about the dosage of her thyroid.    Reviewed primary care physician's/consulting physician documentation and lab results         I have reviewed the patient's allergies, medicines, past medical hx, family hx and social hx in detail.    Objective   Vital Signs:   /80   Pulse 70   Ht 172.7 cm (68\")   Wt 80.7 kg (178 lb)   SpO2 96%   BMI 27.06 kg/m²   Physical Exam   General appearance - no distress  Eyes- anicteric sclera  Ear nose and throat-external ears and nose normal.    Respiratory-normal chest on inspection.  No respiratory distress noted.  Skin-no rashes.  Neuro-alert and oriented x3            Result Review :   The following data was reviewed by: Solomon Barrow MD on 06/08/2021:  Orders Only on 02/01/2021   Component Date Value Ref Range Status   • Campylobacter 02/01/2021 Not Detected  Not Detected Final   • C.difficile toxin A/B 02/01/2021 Not Detected  Not Detected Final   • Plesiomonas shigelloides 02/01/2021 Not Detected  Not Detected Final   • " Salmonella 02/01/2021 Not Detected  Not Detected Final   • Vibrio 02/01/2021 Not Detected  Not Detected Final   • Vibrio cholerae 02/01/2021 Not Detected  Not Detected Final   • Yersinia enterocolitica 02/01/2021 Not Detected  Not Detected Final   • Enteroaggregative E. coli 02/01/2021 Not Detected  Not Detected Final   • Enteropathogenic E. coli 02/01/2021 Not Detected  Not Detected Final   • Enterotoxigenic E. coli 02/01/2021 Not Detected  Not Detected Final   • Shiga-like toxin-producing E. coli  02/01/2021 Not Detected  Not Detected Final   • E. coli O157 02/01/2021 Not applicable  Not Detected Final   • Shigella enteroinvasive E. coli 02/01/2021 Not Detected  Not Detected Final   • Cryptosporidium 02/01/2021 Not Detected  Not Detected Final   • Cyclospora cayetanensis 02/01/2021 Not Detected  Not Detected Final   • Entamoeba Histolytica Ag 02/01/2021 Not Detected  Not Detected Final   • Giardia lamblia 02/01/2021 Not Detected  Not Detected Final   • Adenovirus 40/41 Ag 02/01/2021 Not Detected  Not Detected Final   • Astrovirus 02/01/2021 Not Detected  Not Detected Final   • Norovirus GI/GII 02/01/2021 Not Detected  Not Detected Final   • Rotavirus A 02/01/2021 Not Detected  Not Detected Final   • Sapovirus 02/01/2021 Not Detected  Not Detected Final     Data reviewed: PCP documentation       Results Review:    I reviewed the patient's new clinical results.     Assessment and Plan    Problem List Items Addressed This Visit        Other    Diabetes mellitus, type 2 (CMS/HCC) - Primary    Relevant Orders    TSH    T4, Free    T3, Free    Hemoglobin A1c    Lipid Panel    Basic Metabolic Panel    Hemoglobin A1c    TSH    T4, Free    TSH    T4, Free    T3, Free    Hemoglobin A1c    Lipid Panel    Basic Metabolic Panel    Hemoglobin A1c    TSH    T4, Free    Hyperlipidemia    Relevant Orders    TSH    T4, Free    T3, Free    Hemoglobin A1c    Lipid Panel    Basic Metabolic Panel    Hemoglobin A1c    TSH    T4, Free     "Primary hypothyroidism    Relevant Orders    TSH    T4, Free    T3, Free    Hemoglobin A1c    Lipid Panel    Basic Metabolic Panel    Hemoglobin A1c    TSH    T4, Free      Other Visit Diagnoses     Acquired hypothyroidism        Relevant Orders    TSH    T4, Free    T3, Free    Hemoglobin A1c    Lipid Panel    Basic Metabolic Panel    Hemoglobin A1c    TSH    T4, Free        Type 2 dm - uncontrolled - with variable BG.   Check HbA1c  Adjust the dosage of Toujeo based on the work-up  Continue Victoza  Continue the NovoLog sliding scale.    Hypothyroidism  Levels are not stable  Adjust the dosage of the medication based on the blood work-up today.    Hyperlipidemia  Continue Lipitor 80 mg oral daily.    Interpreted the blood work-up/imaging results performed by the primary care/consulting physician -    Refills sent to pharmacy    Follow Up     Patient was given instructions and counseling regarding her condition or for health maintenance advice. Please see specific information pulled into the AVS if appropriate.       Thank you for asking me to see your patient, Halie Guidry in consultation.         Solomon Barrow MD  06/08/21      EMR Dragon / transcription disclaimer:     \"Dictated utilizing Dragon dictation\".         "

## 2021-06-09 LAB
CHOLEST SERPL-MCNC: 129 MG/DL (ref 0–200)
HBA1C MFR BLD: 7.2 % (ref 4.8–5.6)
HDLC SERPL-MCNC: 37 MG/DL (ref 40–60)
IMP & REVIEW OF LAB RESULTS: NORMAL
LDLC SERPL CALC-MCNC: 68 MG/DL (ref 0–100)
T3FREE SERPL-MCNC: 2.6 PG/ML (ref 2–4.4)
T4 FREE SERPL-MCNC: 1.21 NG/DL (ref 0.93–1.7)
TRIGL SERPL-MCNC: 139 MG/DL (ref 0–150)
TSH SERPL DL<=0.005 MIU/L-ACNC: 1.84 UIU/ML (ref 0.27–4.2)
VLDLC SERPL CALC-MCNC: 24 MG/DL (ref 5–40)

## 2021-06-18 DIAGNOSIS — I10 BENIGN ESSENTIAL HYPERTENSION: ICD-10-CM

## 2021-06-18 DIAGNOSIS — E03.9 PRIMARY HYPOTHYROIDISM: ICD-10-CM

## 2021-06-18 NOTE — TELEPHONE ENCOUNTER
Caller: Halie Guidry    Relationship: Self    Best call back number: [phone]    Medication needed:   Requested Prescriptions     Pending Prescriptions Disp Refills   • lisinopril (PRINIVIL,ZESTRIL) 5 MG tablet 30 tablet 0     Sig: Take 1 tablet by mouth Daily.       When do you need the refill by: 6/22/21    What additional details did the patient provide when requesting the medication: PATIENT HAS 1 1/2 WEEK SUPPLY LEFT    Does the patient have less than a 3 day supply:  [] Yes  [x] No    What is the patient's preferred pharmacy: [facility] - [facility], [address] [phone] [facility] [phone]

## 2021-06-18 NOTE — TELEPHONE ENCOUNTER
patient called and stated that she needs refill for levothyroxine (SYNTHROID, LEVOTHROID) 112 MCG tablet

## 2021-06-19 RX ORDER — LISINOPRIL 5 MG/1
5 TABLET ORAL DAILY
Qty: 15 TABLET | Refills: 0 | Status: SHIPPED | OUTPATIENT
Start: 2021-06-19 | End: 2021-09-07 | Stop reason: SDUPTHER

## 2021-06-21 RX ORDER — LEVOTHYROXINE SODIUM 112 UG/1
112 TABLET ORAL DAILY
Qty: 90 TABLET | Refills: 1 | Status: SHIPPED | OUTPATIENT
Start: 2021-06-21 | End: 2021-10-04

## 2021-07-27 RX ORDER — PEN NEEDLE, DIABETIC 32GX 5/32"
NEEDLE, DISPOSABLE MISCELLANEOUS
Qty: 400 EACH | Refills: 3 | Status: SHIPPED | OUTPATIENT
Start: 2021-07-27 | End: 2021-09-24 | Stop reason: CLARIF

## 2021-09-07 RX ORDER — ALPRAZOLAM 0.5 MG/1
TABLET ORAL
Qty: 30 TABLET | Refills: 0 | Status: CANCELLED | OUTPATIENT
Start: 2021-09-07

## 2021-09-08 ENCOUNTER — TELEMEDICINE (OUTPATIENT)
Dept: FAMILY MEDICINE CLINIC | Facility: CLINIC | Age: 81
End: 2021-09-08

## 2021-09-08 DIAGNOSIS — I10 BENIGN ESSENTIAL HYPERTENSION: Primary | Chronic | ICD-10-CM

## 2021-09-08 PROCEDURE — 99442 PR PHYS/QHP TELEPHONE EVALUATION 11-20 MIN: CPT | Performed by: FAMILY MEDICINE

## 2021-09-08 RX ORDER — BISOPROLOL FUMARATE 5 MG/1
5 TABLET, FILM COATED ORAL DAILY
Qty: 90 TABLET | Refills: 3 | Status: ON HOLD | OUTPATIENT
Start: 2021-09-08 | End: 2022-03-31 | Stop reason: SDUPTHER

## 2021-09-08 RX ORDER — LISINOPRIL 5 MG/1
5 TABLET ORAL DAILY
Qty: 90 TABLET | Refills: 3 | Status: ON HOLD | OUTPATIENT
Start: 2021-09-08 | End: 2022-03-31 | Stop reason: SDUPTHER

## 2021-09-08 RX ORDER — HYDROCHLOROTHIAZIDE 12.5 MG/1
12.5 TABLET ORAL DAILY
Qty: 90 TABLET | Refills: 3 | Status: SHIPPED | OUTPATIENT
Start: 2021-09-08 | End: 2022-05-28 | Stop reason: HOSPADM

## 2021-09-08 NOTE — PROGRESS NOTES
Subjective   Halie Guidry is a 81 y.o. female.     CC: Video Visit for Medical Management    History of Present Illness     Chief Complaint:   Chief Complaint   Patient presents with   • Hypertension     med refill due   - VIDEO / pt to schedule med wellness   • Anxiety       Halie Guidry 81 y.o. female who presents today for Medical Management of the below listed issues and medication refills. She  has a problem list of   Patient Active Problem List   Diagnosis   • Compression fracture   • Lumbar degenerative disc disease   • Diabetes mellitus, type 2 (CMS/[facility])   • Hyperlipidemia   • Benign essential hypertension   • Primary hypothyroidism   • Leukocytosis   • Neuropathy   • Osteoporosis   • Proteinuria   • Tobacco abuse   • Diabetic eye exam (CMS/[facility])   • Generalized weakness   • Spinal stenosis   • Scoliosis   • Arthritis   • VBI (vertebrobasilar insufficiency)   • Orthostatic hypotension   • 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 (CMS/[facility])   • Severe hypothyroidism   • Noncompliance   • Vitamin D deficiency disease   • Altered mental status   • Tremor   • Seizure (CMS/[facility])   • Chronic kidney disease, stage 2 (mild)   • Thoracic degenerative disc disease   .  Since the last visit, She has overall felt well.  she has been compliant with   Current Outpatient Medications:   •  ALPRAZolam (XANAX) 0.5 MG tablet, Take 1 tab prior to the MRI and may repeat x 1 prn, Disp: 2 tablet, Rfl: 0  •  bisoprolol (ZEBeta) 5 MG tablet, Take 1 tablet by mouth Daily., Disp: 90 tablet, Rfl: 3  •  hydroCHLOROthiazide (HYDRODIURIL) 12.5 MG tablet, Take 1 tablet by mouth Daily., Disp: 90 tablet, Rfl: 3  •  lisinopril (PRINIVIL,ZESTRIL) 5 MG tablet, Take 1 tablet by mouth Daily., Disp: 90 tablet, Rfl: 3  •  atorvastatin (LIPITOR) 80 MG tablet, TAKE 1 TABLET EVERY NIGHT, Disp: 90 tablet, Rfl: 3  •  BD Pen Needle Naila 2nd Gen 32G X 4 MM misc, USE TO INJECT INSULIN FOUR TIMES DAILY, Disp: 400 each, Rfl: 3  •   HYDROcodone-acetaminophen (NORCO)  MG per tablet, TK 1 T PO TID, Disp: , Rfl:   •  Insulin Glargine (TOUJEO SOLOSTAR) 300 UNIT/ML solution pen-injector, Inject 60 Units under the skin into the appropriate area as directed every night at bedtime., Disp: 20 pen, Rfl: 3  •  Insulin Lispro (HUMALOG KWIKPEN) 100 UNIT/ML solution pen-injector, Inject 6 Units under the skin into the appropriate area as directed 4 (Three) Times a Day With Meals. Breakfast lunch dinner and snack, Disp: 10 pen, Rfl: 3  •  Insulin Pen Needle (ReliOn Pen Needles) 32G X 4 MM misc, USE TO INJECT INSULIN 4 TIMES A DAY, Disp: 400 each, Rfl: 3  •  Insulin Pen Needle 32G X 4 MM misc, Use to inject insulin 4 TIMES DAILY, Disp: 200 each, Rfl: 0  •  ketoconazole (NIZORAL) 2 % cream, , Disp: , Rfl:   •  lansoprazole (PREVACID) 30 MG capsule, Take 30 mg by mouth Daily., Disp: , Rfl:   •  levothyroxine (SYNTHROID, LEVOTHROID) 112 MCG tablet, Take 1 tablet by mouth Daily., Disp: 90 tablet, Rfl: 1  •  pregabalin (LYRICA) 50 MG capsule, TAKE 1 CAPSULE BY MOUTH THREE TIMES DAILY, Disp: 90 capsule, Rfl: 0  •  Toujeo SoloStar 300 UNIT/ML solution pen-injector injection, INJECT 60 UNITS UNDER THE SKIN INTO THE APPROPRIATE AREA AS DIRECTED EVERY NIGHT AT BEDTIME, Disp: 27 mL, Rfl: 1  •  Victoza 18 MG/3ML solution pen-injector injection, INJECT 1.8 MG DAILY UNDER THE SKIN INTO THE APPROPRIATE AREA AS DIRECTED, Disp: 45 mL, Rfl: 1.  She denies medication side effects.    All of the other chronic condition(s) listed above are stable w/o issues.    There were no vitals taken for this visit.    Results for orders placed or performed in visit on 06/08/21   TSH    Specimen: Blood   Result Value Ref Range    TSH 1.840 0.270 - 4.200 uIU/mL   T4, Free    Specimen: Blood   Result Value Ref Range    Free T4 1.21 0.93 - 1.70 ng/dL   T3, Free    Specimen: Blood   Result Value Ref Range    T3, Free 2.6 2.0 - 4.4 pg/mL   Hemoglobin A1c    Specimen: Blood   Result Value Ref  Range    Hemoglobin A1C 7.20 (H) 4.80 - 5.60 %   Lipid Panel    Specimen: Blood   Result Value Ref Range    Total Cholesterol 129 0 - 200 mg/dL    Triglycerides 139 0 - 150 mg/dL    HDL Cholesterol 37 (L) 40 - 60 mg/dL    VLDL Cholesterol Gerald 24 5 - 40 mg/dL    LDL Chol Calc (NIH) 68 0 - 100 mg/dL   Cardiovascular Risk Assessment   Result Value Ref Range    Interpretation Note              The following portions of the patient's history were reviewed and updated as appropriate: allergies, current medications, past family history, past medical history, past social history, past surgical history, and problem list.    Review of Systems   Constitutional: Negative for activity change, chills and fever.   Respiratory: Negative for cough.    Cardiovascular: Negative for chest pain.   Psychiatric/Behavioral: Negative for dysphoric mood.       Objective   Physical Exam  Constitutional:       General: She is not in acute distress.     Appearance: She is well-developed.   Pulmonary:      Effort: Pulmonary effort is normal.   Neurological:      Mental Status: She is alert and oriented to person, place, and time.   Psychiatric:         Behavior: Behavior normal.         Thought Content: Thought content normal.     Labs from endocrine reviewed by me at today's visit.      Assessment/Plan   Diagnoses and all orders for this visit:    1. Benign essential hypertension (Primary)  -     hydroCHLOROthiazide (HYDRODIURIL) 12.5 MG tablet; Take 1 tablet by mouth Daily.  Dispense: 90 tablet; Refill: 3  -     bisoprolol (ZEBeta) 5 MG tablet; Take 1 tablet by mouth Daily.  Dispense: 90 tablet; Refill: 3  -     lisinopril (PRINIVIL,ZESTRIL) 5 MG tablet; Take 1 tablet by mouth Daily.  Dispense: 90 tablet; Refill: 3    Other orders  -     Cancel: ALPRAZolam (XANAX) 0.5 MG tablet; Take 1 tab prior to the MRI and may repeat x 1 prn  Dispense: 30 tablet; Refill: 0    Spent  11   minutes with chart and interview and consent for this encounter given  by the patient.  You have chosen to receive care through a telehealth visit.  Do you consent to use a TELEPHONE connection for your medical care today? Yes  Unable to complete visit using a video connection to the patient. A phone visit was used to complete this visits. Total time of discussion was 12 minutes.

## 2021-09-23 DIAGNOSIS — E11.59 TYPE 2 DIABETES MELLITUS WITH OTHER CIRCULATORY COMPLICATION, WITH LONG-TERM CURRENT USE OF INSULIN (HCC): Primary | ICD-10-CM

## 2021-09-23 DIAGNOSIS — Z79.4 TYPE 2 DIABETES MELLITUS WITH OTHER CIRCULATORY COMPLICATION, WITH LONG-TERM CURRENT USE OF INSULIN (HCC): Primary | ICD-10-CM

## 2021-09-23 RX ORDER — INSULIN LISPRO 100 [IU]/ML
INJECTION, SOLUTION INTRAVENOUS; SUBCUTANEOUS
Qty: 10 PEN | Refills: 3 | Status: SHIPPED | OUTPATIENT
Start: 2021-09-23 | End: 2021-09-24 | Stop reason: SDUPTHER

## 2021-09-24 DIAGNOSIS — E11.59 TYPE 2 DIABETES MELLITUS WITH OTHER CIRCULATORY COMPLICATION, WITH LONG-TERM CURRENT USE OF INSULIN (HCC): ICD-10-CM

## 2021-09-24 DIAGNOSIS — Z79.4 TYPE 2 DIABETES MELLITUS WITH OTHER CIRCULATORY COMPLICATION, WITH LONG-TERM CURRENT USE OF INSULIN (HCC): ICD-10-CM

## 2021-09-24 RX ORDER — INSULIN LISPRO 100 [IU]/ML
INJECTION, SOLUTION INTRAVENOUS; SUBCUTANEOUS
Qty: 10 PEN | Refills: 3 | Status: SHIPPED | OUTPATIENT
Start: 2021-09-24 | End: 2021-09-28 | Stop reason: SDUPTHER

## 2021-09-28 DIAGNOSIS — Z79.4 TYPE 2 DIABETES MELLITUS WITH OTHER CIRCULATORY COMPLICATION, WITH LONG-TERM CURRENT USE OF INSULIN (HCC): ICD-10-CM

## 2021-09-28 DIAGNOSIS — E11.59 TYPE 2 DIABETES MELLITUS WITH OTHER CIRCULATORY COMPLICATION, WITH LONG-TERM CURRENT USE OF INSULIN (HCC): ICD-10-CM

## 2021-09-28 RX ORDER — INSULIN LISPRO 100 [IU]/ML
INJECTION, SOLUTION INTRAVENOUS; SUBCUTANEOUS
Qty: 15 PEN | Refills: 3 | Status: ON HOLD | OUTPATIENT
Start: 2021-09-28 | End: 2021-12-03 | Stop reason: SDUPTHER

## 2021-09-29 RX ORDER — PREGABALIN 50 MG/1
CAPSULE ORAL
Qty: 270 CAPSULE | Refills: 0 | Status: SHIPPED | OUTPATIENT
Start: 2021-09-29 | End: 2021-12-07 | Stop reason: HOSPADM

## 2021-10-04 DIAGNOSIS — E03.9 PRIMARY HYPOTHYROIDISM: ICD-10-CM

## 2021-10-04 RX ORDER — LEVOTHYROXINE SODIUM 112 UG/1
112 TABLET ORAL DAILY
Qty: 90 TABLET | Refills: 1 | Status: SHIPPED | OUTPATIENT
Start: 2021-10-04 | End: 2022-01-27 | Stop reason: SDUPTHER

## 2021-10-05 RX ORDER — ATORVASTATIN CALCIUM 80 MG/1
TABLET, FILM COATED ORAL
Qty: 90 TABLET | Refills: 3 | Status: SHIPPED | OUTPATIENT
Start: 2021-10-05 | End: 2023-03-15

## 2021-10-18 RX ORDER — LIRAGLUTIDE 6 MG/ML
INJECTION SUBCUTANEOUS
Qty: 45 ML | Refills: 3 | Status: SHIPPED | OUTPATIENT
Start: 2021-10-18 | End: 2021-11-01

## 2021-10-28 ENCOUNTER — TELEPHONE (OUTPATIENT)
Dept: FAMILY MEDICINE CLINIC | Facility: CLINIC | Age: 81
End: 2021-10-28

## 2021-10-28 NOTE — TELEPHONE ENCOUNTER
Caller: [facility]    Relationship to patient: Other    Best call back number: [phone]    Patient is needing: STEPHANE WITH [facility] CALLING TO CHECK STATUS OF PRESCRIPTION REQUEST FOR PATIENT'S DIABETIC MEDICAL SUPPLIES.     STEPHANE STATES THEY HAVE FAXED THIS REQUEST 4 TIMES NOW WITH NO RESPONSE AND ARE KEEP BEING TOLD NO REQUEST WAS RECEIVED. CONFIRMED FAX WITH STEPHANE. STEPHANE ALSO STATES THE LAST PRESCRIPTION WAS SIGNED ON 10/15/2020 BY PATIENTS ENDOCRINOLOGIST DR. CASE BUT WAS ADVISED TO CONTACT DR. SILVA SINCE HE IS HER PCP. NEW PRESCRIPTION REQUEST BEING FAXED AT THIS TIME

## 2021-10-28 NOTE — TELEPHONE ENCOUNTER
LEFT MESSAGE , PER DR SILVA - HE DOES NOT PRESCRIBE DIABETIC MEDICATIONS. APPROVAL MUST COME FROM [facility] PER DR SILVA     THE FORM WAS FAXED BACK STATING THIS. YUN

## 2021-11-01 ENCOUNTER — OFFICE VISIT (OUTPATIENT)
Dept: ENDOCRINOLOGY | Age: 81
End: 2021-11-01

## 2021-11-01 VITALS — RESPIRATION RATE: 20 BRPM | BODY MASS INDEX: 25.76 KG/M2 | WEIGHT: 170 LBS | HEIGHT: 68 IN

## 2021-11-01 DIAGNOSIS — E11.65 TYPE 2 DIABETES MELLITUS WITH HYPERGLYCEMIA, WITH LONG-TERM CURRENT USE OF INSULIN (HCC): Primary | ICD-10-CM

## 2021-11-01 DIAGNOSIS — E78.2 MIXED HYPERLIPIDEMIA: ICD-10-CM

## 2021-11-01 DIAGNOSIS — Z79.4 TYPE 2 DIABETES MELLITUS WITH HYPERGLYCEMIA, WITH LONG-TERM CURRENT USE OF INSULIN (HCC): Primary | ICD-10-CM

## 2021-11-01 DIAGNOSIS — E03.9 ACQUIRED HYPOTHYROIDISM: ICD-10-CM

## 2021-11-01 PROCEDURE — 99214 OFFICE O/P EST MOD 30 MIN: CPT | Performed by: NURSE PRACTITIONER

## 2021-11-01 RX ORDER — SEMAGLUTIDE 1.34 MG/ML
0.5 INJECTION, SOLUTION SUBCUTANEOUS WEEKLY
Qty: 1 PEN | Refills: 2 | Status: SHIPPED | OUTPATIENT
Start: 2021-11-01 | End: 2021-12-29 | Stop reason: SDUPTHER

## 2021-11-01 NOTE — PROGRESS NOTES
"Chief Complaint  Diabetes (FUP DM2 CHECKS BID AVG + PT HAS NEUROPATHY FEET LAST EYE EXAM 03/20 ), Hyperlipidemia, and Hypothyroidism     You have chosen to receive care through a telephone visit. Do you consent to use a telephone visit for your medical care today? Yes    Subjective          I have reviewed PMH, allergies and medications UTD at this visit     Halie Guidry presents to [facility]  History of Present Illness    C/o diarrhea x1 month but started a new probiotic and it has helped    Type 2 dm    Diagnosed about 10+ years ago.   Today in clinic pt reports being on toujeo 60 units at bed time, novolog/humalog 9u-11u ssi if bg is > 150 this is usually once a day, on victoza 1.8 mg subq daily.   \"milly seen rybelsus on TV and want to know if that will help for me\"  Not on metformin r/t diarrhea   Avg BG - 120s in the AM, <200 PM  Checks BG - 2 times a day  Dm retinopathy - x ,Last eye exam - still overdue for exam  Dm nephropathy - yes, CKD 2  Dm neuropathy - yes,Dm neuropathy meds - on lyrica   CAD - x  CVA -x  Episodes of hypoglycemia - no  Pt is somewhat physically active. Down 22 pounds since jan 2021 (192 -->172)  Pt tries to follow DM diet for most part but does have food adversions r/t diarrhea   On Ace inb.     Hyperlipidemia  On Lipitor 80 mg oral daily  Tolerating well      Hypothyroidism   on levothyroxine 112 mcg oral daily.  Denies s/s of hyper and hypothyroidism  Unable to get labs at this time r/t access     Objective   Vital Signs:   Resp 20   Ht 172.7 cm (68\")   Wt 77.1 kg (170 lb)   BMI 25.85 kg/m²     Physical Exam  Vitals reviewed.   Constitutional:       General: She is not in acute distress.  Pulmonary:      Effort: Pulmonary effort is normal. No respiratory distress.   Neurological:      Mental Status: She is alert and oriented to person, place, and time.   Psychiatric:         Mood and Affect: Mood normal.         Behavior: Behavior normal. "         Thought Content: Thought content normal.         Judgment: Judgment normal.          Result Review :   The following data was reviewed by: SERGE Carrion on 11/01/2021:  Common labs    Common Labsle 1/18/21 1/18/21 6/8/21 6/8/21    1406 1406 1522 1522   Glucose  139 (A)     BUN  21     Creatinine  1.19 (A)     eGFR Non  Am  43 (A)     eGFR African Am  50 (A)     Sodium  143     Potassium  4.1     Chloride  104     Calcium  9.1     Total Protein  7.3     Albumin  4.0     Total Bilirubin  0.4     Alkaline Phosphatase  107     AST (SGOT)  12     ALT (SGPT)  7     WBC 9.4      Hemoglobin 12.9      Hematocrit 39.0      Platelets 331      Total Cholesterol    129   Triglycerides    139   HDL Cholesterol    37 (A)   LDL Cholesterol     68   Hemoglobin A1C   7.20 (A)    (A) Abnormal value       Comments are available for some flowsheets but are not being displayed.                     Assessment and Plan    Diagnoses and all orders for this visit:    1. Type 2 diabetes mellitus with hyperglycemia, with long-term current use of insulin (HCC) (Primary)  -     TSH; Future  -     T4, Free; Future  -     Hemoglobin A1c; Future  -     Comprehensive Metabolic Panel; Future  -     Lipid Panel; Future  -     Microalbumin / Creatinine Urine Ratio - Urine, Clean Catch; Future    2. Acquired hypothyroidism  -     TSH; Future  -     T4, Free; Future  -     Hemoglobin A1c; Future  -     Comprehensive Metabolic Panel; Future  -     Lipid Panel; Future  -     Microalbumin / Creatinine Urine Ratio - Urine, Clean Catch; Future    3. Mixed hyperlipidemia  -     TSH; Future  -     T4, Free; Future  -     Hemoglobin A1c; Future  -     Comprehensive Metabolic Panel; Future  -     Lipid Panel; Future  -     Microalbumin / Creatinine Urine Ratio - Urine, Clean Catch; Future    Other orders  -     Semaglutide,0.25 or 0.5MG/DOS, (Ozempic, 0.25 or 0.5 MG/DOSE,) 2 MG/1.5ML solution pen-injector; Inject 0.5 mg under the skin into  the appropriate area as directed 1 (One) Time Per Week for 90 days. Stop victoza  Dispense: 1 pen; Refill: 2        Follow Up   No follow-ups on file.     Will see renu in feb   hold victoza x 1 week and change to ozempic if diarrhea unchanged, will check pricing of rybelsus   Reports she cant get to the eye doctor or out to get labs because she doesn't have a ride  On statin and ace-I  Needs labs to assess thyroid dosing  Needs close monitoring to reduce risk of complications from over or under treatment with thyroid hormone replacement  Continue toujeo and prn humalog     Patient was given instructions and counseling regarding her condition or for health maintenance advice. Please see specific information pulled into the AVS if appropriate.     SERGE Carrion  17 minute phone call

## 2021-12-01 ENCOUNTER — APPOINTMENT (OUTPATIENT)
Dept: GENERAL RADIOLOGY | Facility: HOSPITAL | Age: 81
End: 2021-12-01

## 2021-12-01 ENCOUNTER — APPOINTMENT (OUTPATIENT)
Dept: CT IMAGING | Facility: HOSPITAL | Age: 81
End: 2021-12-01

## 2021-12-01 ENCOUNTER — HOSPITAL ENCOUNTER (OUTPATIENT)
Facility: HOSPITAL | Age: 81
Setting detail: OBSERVATION
LOS: 1 days | Discharge: NURSING, REHAB OR LONG-TERM CARE | End: 2021-12-07
Attending: EMERGENCY MEDICINE | Admitting: INTERNAL MEDICINE

## 2021-12-01 DIAGNOSIS — Z79.4 TYPE 2 DIABETES MELLITUS WITH OTHER CIRCULATORY COMPLICATION, WITH LONG-TERM CURRENT USE OF INSULIN (HCC): ICD-10-CM

## 2021-12-01 DIAGNOSIS — E11.59 TYPE 2 DIABETES MELLITUS WITH OTHER CIRCULATORY COMPLICATION, WITH LONG-TERM CURRENT USE OF INSULIN (HCC): ICD-10-CM

## 2021-12-01 DIAGNOSIS — N17.9 AKI (ACUTE KIDNEY INJURY) (HCC): ICD-10-CM

## 2021-12-01 DIAGNOSIS — R73.9 HYPERGLYCEMIA: ICD-10-CM

## 2021-12-01 DIAGNOSIS — G93.41 METABOLIC ENCEPHALOPATHY: Primary | ICD-10-CM

## 2021-12-01 LAB
ALBUMIN SERPL-MCNC: 4.5 G/DL (ref 3.5–5.2)
ALBUMIN/GLOB SERPL: 1.3 G/DL
ALP SERPL-CCNC: 128 U/L (ref 39–117)
ALT SERPL W P-5'-P-CCNC: 14 U/L (ref 1–33)
ANION GAP SERPL CALCULATED.3IONS-SCNC: 14.6 MMOL/L (ref 5–15)
AST SERPL-CCNC: 14 U/L (ref 1–32)
B-OH-BUTYR SERPL-SCNC: 0.24 MMOL/L (ref 0.02–0.27)
BACTERIA UR QL AUTO: NORMAL /HPF
BASOPHILS # BLD AUTO: 0.14 10*3/MM3 (ref 0–0.2)
BASOPHILS NFR BLD AUTO: 1.1 % (ref 0–1.5)
BILIRUB SERPL-MCNC: 0.7 MG/DL (ref 0–1.2)
BILIRUB UR QL STRIP: NEGATIVE
BUN SERPL-MCNC: 32 MG/DL (ref 8–23)
BUN/CREAT SERPL: 15.3 (ref 7–25)
CALCIUM SPEC-SCNC: 9.5 MG/DL (ref 8.6–10.5)
CHLORIDE SERPL-SCNC: 87 MMOL/L (ref 98–107)
CLARITY UR: CLEAR
CO2 SERPL-SCNC: 21.4 MMOL/L (ref 22–29)
COLOR UR: YELLOW
CREAT SERPL-MCNC: 2.09 MG/DL (ref 0.57–1)
DEPRECATED RDW RBC AUTO: 44.6 FL (ref 37–54)
EOSINOPHIL # BLD AUTO: 0.33 10*3/MM3 (ref 0–0.4)
EOSINOPHIL NFR BLD AUTO: 2.6 % (ref 0.3–6.2)
ERYTHROCYTE [DISTWIDTH] IN BLOOD BY AUTOMATED COUNT: 13.1 % (ref 12.3–15.4)
GFR SERPL CREATININE-BSD FRML MDRD: 23 ML/MIN/1.73
GLOBULIN UR ELPH-MCNC: 3.5 GM/DL
GLUCOSE BLDC GLUCOMTR-MCNC: >599 MG/DL (ref 70–130)
GLUCOSE SERPL-MCNC: 617 MG/DL (ref 65–99)
GLUCOSE UR STRIP-MCNC: ABNORMAL MG/DL
HCT VFR BLD AUTO: 45.2 % (ref 34–46.6)
HGB BLD-MCNC: 14.3 G/DL (ref 12–15.9)
HGB UR QL STRIP.AUTO: ABNORMAL
HYALINE CASTS UR QL AUTO: NORMAL /LPF
KETONES UR QL STRIP: NEGATIVE
LEUKOCYTE ESTERASE UR QL STRIP.AUTO: NEGATIVE
LYMPHOCYTES # BLD AUTO: 1.53 10*3/MM3 (ref 0.7–3.1)
LYMPHOCYTES NFR BLD AUTO: 11.9 % (ref 19.6–45.3)
MCH RBC QN AUTO: 29.1 PG (ref 26.6–33)
MCHC RBC AUTO-ENTMCNC: 31.6 G/DL (ref 31.5–35.7)
MCV RBC AUTO: 91.9 FL (ref 79–97)
MONOCYTES # BLD AUTO: 0.9 10*3/MM3 (ref 0.1–0.9)
MONOCYTES NFR BLD AUTO: 7 % (ref 5–12)
NEUTROPHILS NFR BLD AUTO: 76.9 % (ref 42.7–76)
NEUTROPHILS NFR BLD AUTO: 9.88 10*3/MM3 (ref 1.7–7)
NITRITE UR QL STRIP: NEGATIVE
PH UR STRIP.AUTO: 6 [PH] (ref 5–8)
PLATELET # BLD AUTO: 319 10*3/MM3 (ref 140–450)
PMV BLD AUTO: 10.6 FL (ref 6–12)
POTASSIUM SERPL-SCNC: 4.6 MMOL/L (ref 3.5–5.2)
PROT SERPL-MCNC: 8 G/DL (ref 6–8.5)
PROT UR QL STRIP: ABNORMAL
RBC # BLD AUTO: 4.92 10*6/MM3 (ref 3.77–5.28)
RBC # UR STRIP: NORMAL /HPF
REF LAB TEST METHOD: NORMAL
SODIUM SERPL-SCNC: 123 MMOL/L (ref 136–145)
SP GR UR STRIP: 1.02 (ref 1–1.03)
SQUAMOUS #/AREA URNS HPF: NORMAL /HPF
TROPONIN T SERPL-MCNC: <0.01 NG/ML (ref 0–0.03)
UROBILINOGEN UR QL STRIP: ABNORMAL
WBC # UR STRIP: NORMAL /HPF
WBC NRBC COR # BLD: 12.85 10*3/MM3 (ref 3.4–10.8)

## 2021-12-01 PROCEDURE — 99285 EMERGENCY DEPT VISIT HI MDM: CPT

## 2021-12-01 PROCEDURE — 85025 COMPLETE CBC W/AUTO DIFF WBC: CPT | Performed by: EMERGENCY MEDICINE

## 2021-12-01 PROCEDURE — 63710000001 INSULIN REGULAR HUMAN PER 5 UNITS: Performed by: PHYSICIAN ASSISTANT

## 2021-12-01 PROCEDURE — 85007 BL SMEAR W/DIFF WBC COUNT: CPT | Performed by: EMERGENCY MEDICINE

## 2021-12-01 PROCEDURE — 70450 CT HEAD/BRAIN W/O DYE: CPT

## 2021-12-01 PROCEDURE — 82962 GLUCOSE BLOOD TEST: CPT

## 2021-12-01 PROCEDURE — 99284 EMERGENCY DEPT VISIT MOD MDM: CPT

## 2021-12-01 PROCEDURE — 71045 X-RAY EXAM CHEST 1 VIEW: CPT

## 2021-12-01 PROCEDURE — 84484 ASSAY OF TROPONIN QUANT: CPT | Performed by: EMERGENCY MEDICINE

## 2021-12-01 PROCEDURE — 81001 URINALYSIS AUTO W/SCOPE: CPT | Performed by: EMERGENCY MEDICINE

## 2021-12-01 PROCEDURE — 80053 COMPREHEN METABOLIC PANEL: CPT | Performed by: EMERGENCY MEDICINE

## 2021-12-01 PROCEDURE — 82803 BLOOD GASES ANY COMBINATION: CPT

## 2021-12-01 PROCEDURE — 82010 KETONE BODYS QUAN: CPT | Performed by: EMERGENCY MEDICINE

## 2021-12-01 PROCEDURE — 93010 ELECTROCARDIOGRAM REPORT: CPT | Performed by: INTERNAL MEDICINE

## 2021-12-01 PROCEDURE — 93005 ELECTROCARDIOGRAM TRACING: CPT | Performed by: EMERGENCY MEDICINE

## 2021-12-01 PROCEDURE — 87635 SARS-COV-2 COVID-19 AMP PRB: CPT | Performed by: EMERGENCY MEDICINE

## 2021-12-01 PROCEDURE — 96374 THER/PROPH/DIAG INJ IV PUSH: CPT

## 2021-12-01 RX ORDER — LABETALOL HYDROCHLORIDE 5 MG/ML
10 INJECTION, SOLUTION INTRAVENOUS ONCE
Status: COMPLETED | OUTPATIENT
Start: 2021-12-01 | End: 2021-12-01

## 2021-12-01 RX ORDER — SODIUM CHLORIDE 0.9 % (FLUSH) 0.9 %
10 SYRINGE (ML) INJECTION AS NEEDED
Status: DISCONTINUED | OUTPATIENT
Start: 2021-12-01 | End: 2021-12-07 | Stop reason: HOSPADM

## 2021-12-01 RX ADMIN — LABETALOL HYDROCHLORIDE 10 MG: 5 INJECTION, SOLUTION INTRAVENOUS at 22:57

## 2021-12-01 RX ADMIN — Medication 10 ML: at 22:58

## 2021-12-01 RX ADMIN — INSULIN HUMAN 10 UNITS: 100 INJECTION, SOLUTION PARENTERAL at 23:28

## 2021-12-01 RX ADMIN — SODIUM CHLORIDE, POTASSIUM CHLORIDE, SODIUM LACTATE AND CALCIUM CHLORIDE 1000 ML: 600; 310; 30; 20 INJECTION, SOLUTION INTRAVENOUS at 22:31

## 2021-12-02 ENCOUNTER — APPOINTMENT (OUTPATIENT)
Dept: CARDIOLOGY | Facility: HOSPITAL | Age: 81
End: 2021-12-02

## 2021-12-02 PROBLEM — G93.41 METABOLIC ENCEPHALOPATHY: Status: ACTIVE | Noted: 2021-12-02

## 2021-12-02 PROBLEM — IMO0002 TYPE II DIABETES MELLITUS, UNCONTROLLED: Status: ACTIVE | Noted: 2021-12-02

## 2021-12-02 PROBLEM — N17.9 AKI (ACUTE KIDNEY INJURY) (HCC): Status: ACTIVE | Noted: 2021-12-02

## 2021-12-02 PROBLEM — R73.9 HYPERGLYCEMIA: Status: ACTIVE | Noted: 2021-12-02

## 2021-12-02 LAB
ANION GAP SERPL CALCULATED.3IONS-SCNC: 12.1 MMOL/L (ref 5–15)
AORTIC DIMENSIONLESS INDEX: 0.8 (DI)
ARTERIAL PATENCY WRIST A: POSITIVE
ASCENDING AORTA: 3.6 CM
ATMOSPHERIC PRESS: 747.7 MMHG
ATMOSPHERIC PRESS: 747.7 MMHG
BASE EXCESS BLDA CALC-SCNC: -0.6 MMOL/L (ref 0–2)
BASE EXCESS BLDV CALC-SCNC: -1 MMOL/L (ref -2–2)
BDY SITE: ABNORMAL
BDY SITE: ABNORMAL
BH CV ECHO MEAS - ACS: 2 CM
BH CV ECHO MEAS - AO MAX PG (FULL): 2.4 MMHG
BH CV ECHO MEAS - AO MAX PG: 5.9 MMHG
BH CV ECHO MEAS - AO MEAN PG (FULL): 1 MMHG
BH CV ECHO MEAS - AO MEAN PG: 2.9 MMHG
BH CV ECHO MEAS - AO ROOT AREA (BSA CORRECTED): 1.5
BH CV ECHO MEAS - AO ROOT AREA: 7.3 CM^2
BH CV ECHO MEAS - AO ROOT DIAM: 3 CM
BH CV ECHO MEAS - AO V2 MAX: 121.9 CM/SEC
BH CV ECHO MEAS - AO V2 MEAN: 79.3 CM/SEC
BH CV ECHO MEAS - AO V2 VTI: 26 CM
BH CV ECHO MEAS - ASC AORTA: 3.6 CM
BH CV ECHO MEAS - AVA(I,A): 2.7 CM^2
BH CV ECHO MEAS - AVA(I,D): 2.7 CM^2
BH CV ECHO MEAS - AVA(V,A): 2.6 CM^2
BH CV ECHO MEAS - AVA(V,D): 2.6 CM^2
BH CV ECHO MEAS - BSA(HAYCOCK): 2 M^2
BH CV ECHO MEAS - BSA: 2 M^2
BH CV ECHO MEAS - BZI_BMI: 29.6 KILOGRAMS/M^2
BH CV ECHO MEAS - BZI_METRIC_HEIGHT: 170.2 CM
BH CV ECHO MEAS - BZI_METRIC_WEIGHT: 85.7 KG
BH CV ECHO MEAS - EDV(CUBED): 50.8 ML
BH CV ECHO MEAS - EDV(MOD-SP2): 81 ML
BH CV ECHO MEAS - EDV(MOD-SP4): 81 ML
BH CV ECHO MEAS - EDV(TEICH): 58.2 ML
BH CV ECHO MEAS - EF(CUBED): 67.9 %
BH CV ECHO MEAS - EF(MOD-BP): 62 %
BH CV ECHO MEAS - EF(MOD-SP2): 64.2 %
BH CV ECHO MEAS - EF(MOD-SP4): 58 %
BH CV ECHO MEAS - EF(TEICH): 60.3 %
BH CV ECHO MEAS - ESV(CUBED): 16.3 ML
BH CV ECHO MEAS - ESV(MOD-SP2): 29 ML
BH CV ECHO MEAS - ESV(MOD-SP4): 34 ML
BH CV ECHO MEAS - ESV(TEICH): 23.1 ML
BH CV ECHO MEAS - FS: 31.6 %
BH CV ECHO MEAS - IVS/LVPW: 0.96
BH CV ECHO MEAS - IVSD: 1.1 CM
BH CV ECHO MEAS - LAT PEAK E' VEL: 7 CM/SEC
BH CV ECHO MEAS - LV DIASTOLIC VOL/BSA (35-75): 41 ML/M^2
BH CV ECHO MEAS - LV MASS(C)D: 129.1 GRAMS
BH CV ECHO MEAS - LV MASS(C)DI: 65.4 GRAMS/M^2
BH CV ECHO MEAS - LV MAX PG: 3.6 MMHG
BH CV ECHO MEAS - LV MEAN PG: 1.8 MMHG
BH CV ECHO MEAS - LV SYSTOLIC VOL/BSA (12-30): 17.2 ML/M^2
BH CV ECHO MEAS - LV V1 MAX: 94.3 CM/SEC
BH CV ECHO MEAS - LV V1 MEAN: 63.4 CM/SEC
BH CV ECHO MEAS - LV V1 VTI: 21.4 CM
BH CV ECHO MEAS - LVIDD: 3.7 CM
BH CV ECHO MEAS - LVIDS: 2.5 CM
BH CV ECHO MEAS - LVLD AP2: 7.3 CM
BH CV ECHO MEAS - LVLD AP4: 7.5 CM
BH CV ECHO MEAS - LVLS AP2: 6.1 CM
BH CV ECHO MEAS - LVLS AP4: 6 CM
BH CV ECHO MEAS - LVOT AREA (M): 3.5 CM^2
BH CV ECHO MEAS - LVOT AREA: 3.3 CM^2
BH CV ECHO MEAS - LVOT DIAM: 2.1 CM
BH CV ECHO MEAS - LVPWD: 1.1 CM
BH CV ECHO MEAS - MED PEAK E' VEL: 3.3 CM/SEC
BH CV ECHO MEAS - MV A DUR: 0.18 SEC
BH CV ECHO MEAS - MV A MAX VEL: 90.8 CM/SEC
BH CV ECHO MEAS - MV DEC SLOPE: 301.9 CM/SEC^2
BH CV ECHO MEAS - MV DEC TIME: 330 SEC
BH CV ECHO MEAS - MV E MAX VEL: 57.4 CM/SEC
BH CV ECHO MEAS - MV E/A: 0.63
BH CV ECHO MEAS - MV MAX PG: 3.3 MMHG
BH CV ECHO MEAS - MV MEAN PG: 1 MMHG
BH CV ECHO MEAS - MV P1/2T MAX VEL: 84.8 CM/SEC
BH CV ECHO MEAS - MV P1/2T: 82.3 MSEC
BH CV ECHO MEAS - MV V2 MAX: 90.3 CM/SEC
BH CV ECHO MEAS - MV V2 MEAN: 46.6 CM/SEC
BH CV ECHO MEAS - MV V2 VTI: 27.6 CM
BH CV ECHO MEAS - MVA P1/2T LCG: 2.6 CM^2
BH CV ECHO MEAS - MVA(P1/2T): 2.7 CM^2
BH CV ECHO MEAS - MVA(VTI): 2.6 CM^2
BH CV ECHO MEAS - PA ACC TIME: 0.14 SEC
BH CV ECHO MEAS - PA MAX PG (FULL): 0.25 MMHG
BH CV ECHO MEAS - PA MAX PG: 2.1 MMHG
BH CV ECHO MEAS - PA PR(ACCEL): 16 MMHG
BH CV ECHO MEAS - PA V2 MAX: 73.3 CM/SEC
BH CV ECHO MEAS - PULM A REVS DUR: 0.12 SEC
BH CV ECHO MEAS - PULM A REVS VEL: 22.7 CM/SEC
BH CV ECHO MEAS - PULM DIAS VEL: 44.1 CM/SEC
BH CV ECHO MEAS - PULM S/D: 1.4
BH CV ECHO MEAS - PULM SYS VEL: 63 CM/SEC
BH CV ECHO MEAS - PVA(V,A): 3 CM^2
BH CV ECHO MEAS - PVA(V,D): 3 CM^2
BH CV ECHO MEAS - QP/QS: 0.68
BH CV ECHO MEAS - RAP SYSTOLE: 3 MMHG
BH CV ECHO MEAS - RV MAX PG: 1.9 MMHG
BH CV ECHO MEAS - RV MEAN PG: 0.99 MMHG
BH CV ECHO MEAS - RV V1 MAX: 69 CM/SEC
BH CV ECHO MEAS - RV V1 MEAN: 47.3 CM/SEC
BH CV ECHO MEAS - RV V1 VTI: 15.1 CM
BH CV ECHO MEAS - RVOT AREA: 3.2 CM^2
BH CV ECHO MEAS - RVOT DIAM: 2 CM
BH CV ECHO MEAS - SI(AO): 95.7 ML/M^2
BH CV ECHO MEAS - SI(CUBED): 17.5 ML/M^2
BH CV ECHO MEAS - SI(LVOT): 35.8 ML/M^2
BH CV ECHO MEAS - SI(MOD-SP2): 26.3 ML/M^2
BH CV ECHO MEAS - SI(MOD-SP4): 23.8 ML/M^2
BH CV ECHO MEAS - SI(TEICH): 17.8 ML/M^2
BH CV ECHO MEAS - SV(AO): 188.9 ML
BH CV ECHO MEAS - SV(CUBED): 34.5 ML
BH CV ECHO MEAS - SV(LVOT): 70.7 ML
BH CV ECHO MEAS - SV(MOD-SP2): 52 ML
BH CV ECHO MEAS - SV(MOD-SP4): 47 ML
BH CV ECHO MEAS - SV(RVOT): 48.3 ML
BH CV ECHO MEAS - SV(TEICH): 35.1 ML
BH CV ECHO MEAS - TAPSE (>1.6): 1.7 CM
BH CV ECHO MEASUREMENTS AVERAGE E/E' RATIO: 11.15
BH CV LOWER VASCULAR LEFT COMMON FEMORAL AUGMENT: NORMAL
BH CV LOWER VASCULAR LEFT COMMON FEMORAL COMPETENT: NORMAL
BH CV LOWER VASCULAR LEFT COMMON FEMORAL COMPRESS: NORMAL
BH CV LOWER VASCULAR LEFT COMMON FEMORAL PHASIC: NORMAL
BH CV LOWER VASCULAR LEFT COMMON FEMORAL SPONT: NORMAL
BH CV LOWER VASCULAR LEFT DISTAL FEMORAL COMPRESS: NORMAL
BH CV LOWER VASCULAR LEFT GASTRONEMIUS COMPRESS: NORMAL
BH CV LOWER VASCULAR LEFT GREATER SAPH AK COMPRESS: NORMAL
BH CV LOWER VASCULAR LEFT GREATER SAPH BK COMPRESS: NORMAL
BH CV LOWER VASCULAR LEFT LESSER SAPH COMPRESS: NORMAL
BH CV LOWER VASCULAR LEFT MID FEMORAL AUGMENT: NORMAL
BH CV LOWER VASCULAR LEFT MID FEMORAL COMPETENT: NORMAL
BH CV LOWER VASCULAR LEFT MID FEMORAL COMPRESS: NORMAL
BH CV LOWER VASCULAR LEFT MID FEMORAL PHASIC: NORMAL
BH CV LOWER VASCULAR LEFT MID FEMORAL SPONT: NORMAL
BH CV LOWER VASCULAR LEFT PERONEAL COMPRESS: NORMAL
BH CV LOWER VASCULAR LEFT POPLITEAL AUGMENT: NORMAL
BH CV LOWER VASCULAR LEFT POPLITEAL COMPETENT: NORMAL
BH CV LOWER VASCULAR LEFT POPLITEAL COMPRESS: NORMAL
BH CV LOWER VASCULAR LEFT POPLITEAL PHASIC: NORMAL
BH CV LOWER VASCULAR LEFT POPLITEAL SPONT: NORMAL
BH CV LOWER VASCULAR LEFT POSTERIOR TIBIAL COMPRESS: NORMAL
BH CV LOWER VASCULAR LEFT PROFUNDA FEMORAL COMPRESS: NORMAL
BH CV LOWER VASCULAR LEFT PROXIMAL FEMORAL COMPRESS: NORMAL
BH CV LOWER VASCULAR LEFT SAPHENOFEMORAL JUNCTION COMPRESS: NORMAL
BH CV LOWER VASCULAR RIGHT COMMON FEMORAL AUGMENT: NORMAL
BH CV LOWER VASCULAR RIGHT COMMON FEMORAL COMPETENT: NORMAL
BH CV LOWER VASCULAR RIGHT COMMON FEMORAL COMPRESS: NORMAL
BH CV LOWER VASCULAR RIGHT COMMON FEMORAL PHASIC: NORMAL
BH CV LOWER VASCULAR RIGHT COMMON FEMORAL SPONT: NORMAL
BH CV LOWER VASCULAR RIGHT DISTAL FEMORAL COMPRESS: NORMAL
BH CV LOWER VASCULAR RIGHT GASTRONEMIUS COMPRESS: NORMAL
BH CV LOWER VASCULAR RIGHT GREATER SAPH AK COMPRESS: NORMAL
BH CV LOWER VASCULAR RIGHT GREATER SAPH BK COMPRESS: NORMAL
BH CV LOWER VASCULAR RIGHT LESSER SAPH COMPRESS: NORMAL
BH CV LOWER VASCULAR RIGHT MID FEMORAL AUGMENT: NORMAL
BH CV LOWER VASCULAR RIGHT MID FEMORAL COMPETENT: NORMAL
BH CV LOWER VASCULAR RIGHT MID FEMORAL COMPRESS: NORMAL
BH CV LOWER VASCULAR RIGHT MID FEMORAL PHASIC: NORMAL
BH CV LOWER VASCULAR RIGHT MID FEMORAL SPONT: NORMAL
BH CV LOWER VASCULAR RIGHT PERONEAL COMPRESS: NORMAL
BH CV LOWER VASCULAR RIGHT POPLITEAL AUGMENT: NORMAL
BH CV LOWER VASCULAR RIGHT POPLITEAL COMPETENT: NORMAL
BH CV LOWER VASCULAR RIGHT POPLITEAL COMPRESS: NORMAL
BH CV LOWER VASCULAR RIGHT POPLITEAL PHASIC: NORMAL
BH CV LOWER VASCULAR RIGHT POPLITEAL SPONT: NORMAL
BH CV LOWER VASCULAR RIGHT POSTERIOR TIBIAL COMPRESS: NORMAL
BH CV LOWER VASCULAR RIGHT PROFUNDA FEMORAL COMPRESS: NORMAL
BH CV LOWER VASCULAR RIGHT PROXIMAL FEMORAL COMPRESS: NORMAL
BH CV LOWER VASCULAR RIGHT SAPHENOFEMORAL JUNCTION COMPRESS: NORMAL
BH CV VAS BP RIGHT ARM: NORMAL MMHG
BH CV XLRA - RV BASE: 3.4 CM
BH CV XLRA - RV LENGTH: 6 CM
BH CV XLRA - RV MID: 2 CM
BH CV XLRA - TDI S': 10.8 CM/SEC
BUN SERPL-MCNC: 25 MG/DL (ref 8–23)
BUN/CREAT SERPL: 17 (ref 7–25)
CALCIUM SPEC-SCNC: 9.3 MG/DL (ref 8.6–10.5)
CHLORIDE SERPL-SCNC: 94 MMOL/L (ref 98–107)
CO2 SERPL-SCNC: 23.9 MMOL/L (ref 22–29)
CREAT SERPL-MCNC: 1.47 MG/DL (ref 0.57–1)
DEPRECATED RDW RBC AUTO: 42.1 FL (ref 37–54)
ERYTHROCYTE [DISTWIDTH] IN BLOOD BY AUTOMATED COUNT: 13.2 % (ref 12.3–15.4)
GFR SERPL CREATININE-BSD FRML MDRD: 34 ML/MIN/1.73
GLUCOSE BLDC GLUCOMTR-MCNC: 214 MG/DL (ref 70–130)
GLUCOSE BLDC GLUCOMTR-MCNC: 259 MG/DL (ref 70–130)
GLUCOSE BLDC GLUCOMTR-MCNC: 266 MG/DL (ref 70–130)
GLUCOSE BLDC GLUCOMTR-MCNC: 300 MG/DL (ref 70–130)
GLUCOSE BLDC GLUCOMTR-MCNC: 323 MG/DL (ref 70–130)
GLUCOSE BLDC GLUCOMTR-MCNC: 360 MG/DL (ref 70–130)
GLUCOSE BLDC GLUCOMTR-MCNC: 515 MG/DL (ref 70–130)
GLUCOSE BLDC GLUCOMTR-MCNC: 517 MG/DL (ref 70–130)
GLUCOSE SERPL-MCNC: 317 MG/DL (ref 65–99)
HBA1C MFR BLD: 10.9 % (ref 4.8–5.6)
HCO3 BLDA-SCNC: 22.9 MMOL/L (ref 22–28)
HCO3 BLDV-SCNC: 24.1 MMOL/L (ref 22–28)
HCT VFR BLD AUTO: 37.8 % (ref 34–46.6)
HGB BLD-MCNC: 12.7 G/DL (ref 12–15.9)
LEFT ATRIUM VOLUME INDEX: 39 ML/M2
MAXIMAL PREDICTED HEART RATE: 139 BPM
MAXIMAL PREDICTED HEART RATE: 139 BPM
MCH RBC QN AUTO: 29.4 PG (ref 26.6–33)
MCHC RBC AUTO-ENTMCNC: 33.6 G/DL (ref 31.5–35.7)
MCV RBC AUTO: 87.5 FL (ref 79–97)
MODALITY: ABNORMAL
MODALITY: ABNORMAL
PCO2 BLDA: 33.5 MM HG (ref 35–45)
PCO2 BLDV: 41 MM HG (ref 41–51)
PH BLDA: 7.44 PH UNITS (ref 7.35–7.45)
PH BLDV: 7.38 PH UNITS (ref 7.31–7.41)
PLATELET # BLD AUTO: 320 10*3/MM3 (ref 140–450)
PMV BLD AUTO: 10 FL (ref 6–12)
PO2 BLDA: 56.9 MM HG (ref 80–100)
PO2 BLDV: 18 MM HG (ref 35–45)
POTASSIUM SERPL-SCNC: 3.6 MMOL/L (ref 3.5–5.2)
QT INTERVAL: 402 MS
RBC # BLD AUTO: 4.32 10*6/MM3 (ref 3.77–5.28)
SAO2 % BLDCOA: 25.7 % (ref 92–99)
SAO2 % BLDCOA: 90.6 % (ref 92–99)
SARS-COV-2 RNA PNL SPEC NAA+PROBE: NOT DETECTED
SINUS: 3 CM
SODIUM SERPL-SCNC: 130 MMOL/L (ref 136–145)
STJ: 3 CM
STRESS TARGET HR: 118 BPM
STRESS TARGET HR: 118 BPM
TOTAL RATE: 16 BREATHS/MINUTE
TOTAL RATE: 16 BREATHS/MINUTE
TSH SERPL DL<=0.05 MIU/L-ACNC: 4.05 UIU/ML (ref 0.27–4.2)
WBC NRBC COR # BLD: 12.96 10*3/MM3 (ref 3.4–10.8)

## 2021-12-02 PROCEDURE — 84443 ASSAY THYROID STIM HORMONE: CPT | Performed by: NURSE PRACTITIONER

## 2021-12-02 PROCEDURE — G0378 HOSPITAL OBSERVATION PER HR: HCPCS

## 2021-12-02 PROCEDURE — 82962 GLUCOSE BLOOD TEST: CPT

## 2021-12-02 PROCEDURE — 82803 BLOOD GASES ANY COMBINATION: CPT

## 2021-12-02 PROCEDURE — 63710000001 INSULIN LISPRO (HUMAN) PER 5 UNITS: Performed by: HOSPITALIST

## 2021-12-02 PROCEDURE — 36415 COLL VENOUS BLD VENIPUNCTURE: CPT | Performed by: NURSE PRACTITIONER

## 2021-12-02 PROCEDURE — 63710000001 INSULIN REGULAR HUMAN PER 5 UNITS: Performed by: PHYSICIAN ASSISTANT

## 2021-12-02 PROCEDURE — 80048 BASIC METABOLIC PNL TOTAL CA: CPT | Performed by: NURSE PRACTITIONER

## 2021-12-02 PROCEDURE — 85027 COMPLETE CBC AUTOMATED: CPT | Performed by: NURSE PRACTITIONER

## 2021-12-02 PROCEDURE — 93306 TTE W/DOPPLER COMPLETE: CPT | Performed by: INTERNAL MEDICINE

## 2021-12-02 PROCEDURE — 96361 HYDRATE IV INFUSION ADD-ON: CPT

## 2021-12-02 PROCEDURE — 93306 TTE W/DOPPLER COMPLETE: CPT

## 2021-12-02 PROCEDURE — 36600 WITHDRAWAL OF ARTERIAL BLOOD: CPT

## 2021-12-02 PROCEDURE — 93970 EXTREMITY STUDY: CPT

## 2021-12-02 PROCEDURE — 83036 HEMOGLOBIN GLYCOSYLATED A1C: CPT | Performed by: NURSE PRACTITIONER

## 2021-12-02 PROCEDURE — 63710000001 INSULIN REGULAR HUMAN PER 5 UNITS: Performed by: NURSE PRACTITIONER

## 2021-12-02 PROCEDURE — 63710000001 INSULIN GLARGINE PER 5 UNITS: Performed by: NURSE PRACTITIONER

## 2021-12-02 RX ORDER — LEVOTHYROXINE SODIUM 112 UG/1
112 TABLET ORAL
Status: DISCONTINUED | OUTPATIENT
Start: 2021-12-02 | End: 2021-12-07 | Stop reason: HOSPADM

## 2021-12-02 RX ORDER — BISOPROLOL FUMARATE 5 MG/1
5 TABLET, FILM COATED ORAL DAILY
Status: DISCONTINUED | OUTPATIENT
Start: 2021-12-02 | End: 2021-12-07 | Stop reason: HOSPADM

## 2021-12-02 RX ORDER — SODIUM CHLORIDE 0.9 % (FLUSH) 0.9 %
10 SYRINGE (ML) INJECTION AS NEEDED
Status: DISCONTINUED | OUTPATIENT
Start: 2021-12-02 | End: 2021-12-07 | Stop reason: HOSPADM

## 2021-12-02 RX ORDER — HYDROCODONE BITARTRATE AND ACETAMINOPHEN 10; 325 MG/1; MG/1
1 TABLET ORAL EVERY 8 HOURS PRN
Status: DISCONTINUED | OUTPATIENT
Start: 2021-12-02 | End: 2021-12-07 | Stop reason: HOSPADM

## 2021-12-02 RX ORDER — ATORVASTATIN CALCIUM 80 MG/1
80 TABLET, FILM COATED ORAL NIGHTLY
Status: DISCONTINUED | OUTPATIENT
Start: 2021-12-02 | End: 2021-12-07 | Stop reason: HOSPADM

## 2021-12-02 RX ORDER — ONDANSETRON 2 MG/ML
4 INJECTION INTRAMUSCULAR; INTRAVENOUS EVERY 6 HOURS PRN
Status: DISCONTINUED | OUTPATIENT
Start: 2021-12-02 | End: 2021-12-07 | Stop reason: HOSPADM

## 2021-12-02 RX ORDER — PREGABALIN 50 MG/1
50 CAPSULE ORAL EVERY 12 HOURS SCHEDULED
Status: DISCONTINUED | OUTPATIENT
Start: 2021-12-02 | End: 2021-12-07 | Stop reason: HOSPADM

## 2021-12-02 RX ORDER — DEXTROSE MONOHYDRATE 25 G/50ML
25 INJECTION, SOLUTION INTRAVENOUS
Status: DISCONTINUED | OUTPATIENT
Start: 2021-12-02 | End: 2021-12-07 | Stop reason: HOSPADM

## 2021-12-02 RX ORDER — ACETAMINOPHEN 650 MG/1
650 SUPPOSITORY RECTAL EVERY 4 HOURS PRN
Status: DISCONTINUED | OUTPATIENT
Start: 2021-12-02 | End: 2021-12-07 | Stop reason: HOSPADM

## 2021-12-02 RX ORDER — ACETAMINOPHEN 325 MG/1
650 TABLET ORAL EVERY 4 HOURS PRN
Status: DISCONTINUED | OUTPATIENT
Start: 2021-12-02 | End: 2021-12-07 | Stop reason: HOSPADM

## 2021-12-02 RX ORDER — PANTOPRAZOLE SODIUM 40 MG/1
40 TABLET, DELAYED RELEASE ORAL
Status: DISCONTINUED | OUTPATIENT
Start: 2021-12-02 | End: 2021-12-07 | Stop reason: HOSPADM

## 2021-12-02 RX ORDER — CALCIUM CARBONATE 200(500)MG
2 TABLET,CHEWABLE ORAL 2 TIMES DAILY PRN
Status: DISCONTINUED | OUTPATIENT
Start: 2021-12-02 | End: 2021-12-07 | Stop reason: HOSPADM

## 2021-12-02 RX ORDER — SODIUM CHLORIDE 9 MG/ML
100 INJECTION, SOLUTION INTRAVENOUS CONTINUOUS
Status: DISCONTINUED | OUTPATIENT
Start: 2021-12-02 | End: 2021-12-03

## 2021-12-02 RX ORDER — SODIUM CHLORIDE 0.9 % (FLUSH) 0.9 %
10 SYRINGE (ML) INJECTION EVERY 12 HOURS SCHEDULED
Status: DISCONTINUED | OUTPATIENT
Start: 2021-12-02 | End: 2021-12-07 | Stop reason: HOSPADM

## 2021-12-02 RX ORDER — INSULIN GLARGINE 100 [IU]/ML
60 INJECTION, SOLUTION SUBCUTANEOUS NIGHTLY
Status: DISCONTINUED | OUTPATIENT
Start: 2021-12-02 | End: 2021-12-07 | Stop reason: HOSPADM

## 2021-12-02 RX ORDER — ACETAMINOPHEN 160 MG/5ML
650 SOLUTION ORAL EVERY 4 HOURS PRN
Status: DISCONTINUED | OUTPATIENT
Start: 2021-12-02 | End: 2021-12-07 | Stop reason: HOSPADM

## 2021-12-02 RX ORDER — INSULIN LISPRO 100 [IU]/ML
10 INJECTION, SOLUTION INTRAVENOUS; SUBCUTANEOUS
Status: DISCONTINUED | OUTPATIENT
Start: 2021-12-02 | End: 2021-12-07 | Stop reason: HOSPADM

## 2021-12-02 RX ORDER — HYDROCODONE BITARTRATE AND ACETAMINOPHEN 10; 325 MG/1; MG/1
1 TABLET ORAL ONCE
Status: COMPLETED | OUTPATIENT
Start: 2021-12-02 | End: 2021-12-02

## 2021-12-02 RX ORDER — NICOTINE POLACRILEX 4 MG
15 LOZENGE BUCCAL
Status: DISCONTINUED | OUTPATIENT
Start: 2021-12-02 | End: 2021-12-07 | Stop reason: HOSPADM

## 2021-12-02 RX ORDER — NITROGLYCERIN 0.4 MG/1
0.4 TABLET SUBLINGUAL
Status: DISCONTINUED | OUTPATIENT
Start: 2021-12-02 | End: 2021-12-07 | Stop reason: HOSPADM

## 2021-12-02 RX ORDER — ONDANSETRON 4 MG/1
4 TABLET, FILM COATED ORAL EVERY 6 HOURS PRN
Status: DISCONTINUED | OUTPATIENT
Start: 2021-12-02 | End: 2021-12-07 | Stop reason: HOSPADM

## 2021-12-02 RX ADMIN — HYDROCODONE BITARTRATE AND ACETAMINOPHEN 1 TABLET: 10; 325 TABLET ORAL at 18:47

## 2021-12-02 RX ADMIN — BISOPROLOL FUMARATE 5 MG: 5 TABLET, FILM COATED ORAL at 08:28

## 2021-12-02 RX ADMIN — PREGABALIN 50 MG: 50 CAPSULE ORAL at 21:17

## 2021-12-02 RX ADMIN — INSULIN HUMAN 10 UNITS: 100 INJECTION, SOLUTION PARENTERAL at 00:49

## 2021-12-02 RX ADMIN — SODIUM CHLORIDE, PRESERVATIVE FREE 10 ML: 5 INJECTION INTRAVENOUS at 04:32

## 2021-12-02 RX ADMIN — INSULIN LISPRO 10 UNITS: 100 INJECTION, SOLUTION INTRAVENOUS; SUBCUTANEOUS at 18:47

## 2021-12-02 RX ADMIN — SODIUM CHLORIDE, PRESERVATIVE FREE 10 ML: 5 INJECTION INTRAVENOUS at 21:30

## 2021-12-02 RX ADMIN — INSULIN HUMAN 8 UNITS: 100 INJECTION, SOLUTION PARENTERAL at 18:47

## 2021-12-02 RX ADMIN — ATORVASTATIN CALCIUM 80 MG: 80 TABLET, FILM COATED ORAL at 21:17

## 2021-12-02 RX ADMIN — PANTOPRAZOLE SODIUM 40 MG: 40 TABLET, DELAYED RELEASE ORAL at 18:47

## 2021-12-02 RX ADMIN — SODIUM CHLORIDE 100 ML/HR: 9 INJECTION, SOLUTION INTRAVENOUS at 03:34

## 2021-12-02 RX ADMIN — PREGABALIN 50 MG: 50 CAPSULE ORAL at 08:28

## 2021-12-02 RX ADMIN — HYDROCODONE BITARTRATE AND ACETAMINOPHEN 1 TABLET: 10; 325 TABLET ORAL at 05:12

## 2021-12-02 RX ADMIN — PANTOPRAZOLE SODIUM 40 MG: 40 TABLET, DELAYED RELEASE ORAL at 08:28

## 2021-12-02 RX ADMIN — SODIUM CHLORIDE 100 ML/HR: 9 INJECTION, SOLUTION INTRAVENOUS at 04:32

## 2021-12-02 RX ADMIN — LEVOTHYROXINE SODIUM 112 MCG: 0.11 TABLET ORAL at 08:28

## 2021-12-02 RX ADMIN — INSULIN GLARGINE 60 UNITS: 100 INJECTION, SOLUTION SUBCUTANEOUS at 21:29

## 2021-12-02 RX ADMIN — INSULIN HUMAN 10 UNITS: 100 INJECTION, SOLUTION PARENTERAL at 12:03

## 2021-12-02 RX ADMIN — INSULIN LISPRO 10 UNITS: 100 INJECTION, SOLUTION INTRAVENOUS; SUBCUTANEOUS at 12:03

## 2021-12-02 RX ADMIN — INSULIN HUMAN 10 UNITS: 100 INJECTION, SOLUTION PARENTERAL at 05:01

## 2021-12-02 NOTE — CONSULTS
"Diabetes Education  Assessment/Teaching    Patient Name:  Halie Guidry  YOB: 1940  MRN: [id number]  Admit Date:  12/1/2021      Assessment Date:  12/2/2021    Most Recent Value   General Information     Referral From: A1c,  Database  [A1C 10.9%]   Height 172.7 cm (67.99\")   Height Method Stated   Weight 85.9 kg (189 lb 6 oz)   Weight Method Bed scale   Are you currently involved in an activity/exercise program?  No   Pregnancy Assessment    Diabetes History    What type of diabetes do you have? Type 2   Length of Diabetes Diagnosis 10 + years   Current DM knowledge fair   Have you had diabetes education/teaching in the past? no   Do you test your blood sugar at home? yes   Have you had low blood sugar? (<70mg/dl) no   Have you had high blood sugar? (>140mg/dl) yes   How often do you have high blood sugar? frequently   How would you rate your diabetes control? poor   Have You Felt Down, Depressed or Hopeless? yes   Have You Felt Little Interest or Pleasure in Doing Things? yes   What makes it difficult for you to take care of your diabetes or yourself? very limited activity   Education Preferences    What areas of diabetes would you like to learn about? avoiding high blood sugar,  medications for diabetes,  testing my blood sugar at home   Nutrition Information    Assessment Topics    Healthy Eating - Assessment Competent   Taking Medication - Assessment Needs education   Monitoring - Assessment Needs education   DM Goals    Taking Medication - Goal 0-7 days from discharge   Monitoring - Goal 0-7 days from discharge            Most Recent Value   DM Education Needs    Meter Has own   Frequency of Testing 2 times a day   Medication Insulin,  Other injectables  [tujeo 60U QD, Humulog 6U TID,ozempic]   Problem Solving Hyperglycemia   Healthy Eating Reviewed meal plan   Physical Activity None   Physical Activity Frequency Never   Healthy Coping Appropriate,  Anxious,  Frustrated   Discharge Plan " Home   Motivation Moderate   Teaching Method Discussion,  Explanation,  Teach back,  Handouts   Patient Response Verbalized understanding,  Needs reinforcement            Other Comments:  discussed with pt her diabetes management. She states that she see Dr Barrow,last visit she had a phone call with NP in office. She has been testing her BG two times day and knows that it has been very  High.  Pt takes Tujeo 60U every day and takes Humulog 6U before meals but does not increase with increase carb intake or increase BG readings.    Discussed with pt to speak to MD about increasing her mealtime insulin if BG readings higher. Also discussed with pt looking into CGM devices,which she says her son has talked about with her.She lives with her son.  Diet was discussed,pt has teeth issues and has recent bouts of diarrhea and is hesitant to make big changes in diet. She states she does not drink any sugar.        Electronically signed by:  Alexia Prajapati RN  12/02/21 15:06 EST

## 2021-12-02 NOTE — PLAN OF CARE
Problem: Fluid Volume Deficit  Goal: Fluid Balance  Outcome: Ongoing, Progressing     Problem: Diabetes Comorbidity  Goal: Blood Glucose Level Within Desired Range  Outcome: Ongoing, Progressing     Problem: Pain Chronic (Persistent) (Comorbidity Management)  Goal: Acceptable Pain Control and Functional Ability  Outcome: Ongoing, Progressing     Problem: Fall Injury Risk  Goal: Absence of Fall and Fall-Related Injury  Outcome: Ongoing, Progressing  Intervention: Promote Injury-Free Environment  Recent Flowsheet Documentation  Taken 12/2/2021 1600 by Beena Wyatt RN  Safety Promotion/Fall Prevention: patient off unit  Taken 12/2/2021 1400 by Beena Wyatt RN  Safety Promotion/Fall Prevention:   fall prevention program maintained   safety round/check completed  Taken 12/2/2021 1200 by Beena Wyatt RN  Safety Promotion/Fall Prevention:   fall prevention program maintained   safety round/check completed  Taken 12/2/2021 1014 by Beena Wyatt RN  Safety Promotion/Fall Prevention:   fall prevention program maintained   safety round/check completed  Taken 12/2/2021 0829 by Beena Wyatt RN  Safety Promotion/Fall Prevention:   fall prevention program maintained   safety round/check completed     Problem: Skin Injury Risk Increased  Goal: Skin Health and Integrity  Outcome: Ongoing, Progressing  Intervention: Optimize Skin Protection  Recent Flowsheet Documentation  Taken 12/2/2021 0829 by Beena Wyatt RN  Pressure Reduction Techniques:   frequent weight shift encouraged   weight shift assistance provided     Problem: Adult Inpatient Plan of Care  Goal: Plan of Care Review  Outcome: Ongoing, Progressing  Flowsheets (Taken 12/2/2021 1828)  Progress: improving  Outcome Summary: Confused/forgetful at times. Medicated prn for c/o back pain. BLE doppler and echo complete. IVF continue. Insulin adjusted. Resting at present.  Goal: Patient-Specific Goal (Individualized)  Outcome: Ongoing,  Progressing  Goal: Absence of Hospital-Acquired Illness or Injury  Outcome: Ongoing, Progressing  Intervention: Identify and Manage Fall Risk  Recent Flowsheet Documentation  Taken 12/2/2021 1600 by Beena Wyatt RN  Safety Promotion/Fall Prevention: patient off unit  Taken 12/2/2021 1400 by Beena Wyatt RN  Safety Promotion/Fall Prevention:   fall prevention program maintained   safety round/check completed  Taken 12/2/2021 1200 by Beena Wyatt RN  Safety Promotion/Fall Prevention:   fall prevention program maintained   safety round/check completed  Taken 12/2/2021 1014 by Beena Wyatt RN  Safety Promotion/Fall Prevention:   fall prevention program maintained   safety round/check completed  Taken 12/2/2021 0829 by Beena Wyatt RN  Safety Promotion/Fall Prevention:   fall prevention program maintained   safety round/check completed  Goal: Optimal Comfort and Wellbeing  Outcome: Ongoing, Progressing  Goal: Readiness for Transition of Care  Outcome: Ongoing, Progressing   Goal Outcome Evaluation:           Progress: improving  Outcome Summary: Confused/forgetful at times. Medicated prn for c/o back pain. BLE doppler and echo complete. IVF continue. Insulin adjusted. Resting at present.

## 2021-12-02 NOTE — CONSULTS
"Adult Nutrition  Assessment/PES    Patient Name:  Halie Guidry  YOB: 1940  MRN: [id number]  Admit Date:  12/1/2021    Assessment Date:  12/2/2021    Comments:  Nutrition consult for diet education.  Admitted with AMS, hyperglycemia, uncontrolled DM2.  Lives with son.  HgbA1c 10.9.      Attempted visit, patient sleeping and son on telephone.  RD to attempt follow up next date to provide education.     Reason for Assessment     Row Name 12/02/21 1410          Reason for Assessment    Reason For Assessment nurse/nurse practitioner consult     Diagnosis substance use/abuse; cardiac disease; diabetes diagnosis/complications; endocrine conditions; psychosocial; other (see comments)  former smoker, DM2, HTN, HLD, hypothyroid, anxiety, arthritis, bleeding d/o, depression, OP, spinal stenosis; adm with hyperglycemia, AMS, DM2     Identified At Risk by Screening Criteria need for education                Nutrition/Diet History     Row Name 12/02/21 1411          Nutrition/Diet History    Typical Food/Fluid Intake no intake available                Anthropometrics     Row Name 12/02/21 1411          Anthropometrics    Height 172.7 cm (67.99\")            Admit Weight    Admit Weight --  189# 12/2            Ideal Body Weight (IBW)    Ideal Body Weight (IBW) (kg) 64.13            Usual Body Weight (UBW)    Weight Loss Time Frame weight appears fairly stable per chart wt hx            Body Mass Index (BMI)    BMI Assessment BMI 25-29.9: overweight  28.73                Labs/Tests/Procedures/Meds     Row Name 12/02/21 1412          Labs/Procedures/Meds    Lab Results Reviewed reviewed, pertinent     Lab Results Comments Gluc, Na, Creat, BUN, GFR, HgbA1c 10.9, WBC            Diagnostic Tests/Procedures    Diagnostic Test/Procedure Reviewed reviewed, pertinent            Medications    Pertinent Medications Reviewed reviewed, pertinent     Pertinent Medications Comments lantus, admelog, humlin, protonix, IVFs  " "              Physical Findings     Row Name 12/02/21 1413          Physical Findings    Overall Physical Appearance overweight     Skin edema; other (see comments)  B=19, bruised, rash; mild edema                Estimated/Assessed Needs     Row Name 12/02/21 1419 12/02/21 1411       Calculation Measurements    Weight Used For Calculations 85.9 kg (189 lb 6 oz) --    Height -- 172.7 cm (67.99\")       Estimated/Assessed Needs    Additional Documentation KCAL/KG (Group); Protein Requirements (Group); Fluid Requirements (Group) --       KCAL/KG    KCAL/KG 20 Kcal/Kg (kcal); 25 Kcal/Kg (kcal) --    20 Kcal/Kg (kcal) 1718 --    25 Kcal/Kg (kcal) 2147.5 --       Protein Requirements    Weight Used For Protein Calculations 85.9 kg (189 lb 6 oz) --    Est Protein Requirement Amount (gms/kg) 1.0 gm protein --    Estimated Protein Requirements (gms/day) 85.9 --       Fluid Requirements    Fluid Requirements (mL/day) 2100 --    Estimated Fluid Requirement Method RDA Method --    RDA Method (mL) 2100 --               Nutrition Prescription Ordered     Row Name 12/02/21 1420          Nutrition Prescription PO    Current PO Diet Regular     Common Modifiers Consistent Carbohydrate                Evaluation of Received Nutrient/Fluid Intake     Row Name 12/02/21 1421          PO Evaluation    Number of Days PO Intake Evaluated Insufficient Data                     Problem/Interventions:   Problem 1     Row Name 12/02/21 1421          Nutrition Diagnoses Problem 1    Problem 1 Knowledge Deficit     Etiology (related to) Medical Diagnosis     Endocrine DM2     Signs/Symptoms (evidenced by) Potential Information Deficit                      Intervention Goal     Row Name 12/02/21 1421          Intervention Goal    General Maintain nutrition; Disease management/therapy; Meet nutritional needs for age/condition     PO Tolerate PO; Establish PO; PO intake (%)     PO Intake % 75 %     Weight No significant weight loss                " Nutrition Intervention     Row Name 12/02/21 1421          Nutrition Intervention    RD/Tech Action Care plan reviewd; Follow Tx progress                  Education/Evaluation     Row Name 12/02/21 1422          Education    Education Will Instruct as appropriate            Monitor/Evaluation    Monitor Per protocol; PO intake; Pertinent labs; Weight; Symptoms; Skin status                 Electronically signed by:  Marisel Pritchard RD  12/02/21 14:22 EST

## 2021-12-02 NOTE — ED NOTES
Nursing report ED to floor  Halie Guidry  81 y.o.  female    HPI :   Chief Complaint   Patient presents with   • Hyperglycemia       Admitting doctor:   Ulisses Lobo MD    Admitting diagnosis:   The primary encounter diagnosis was Metabolic encephalopathy. Diagnoses of Hyperglycemia and VIPUL (acute kidney injury) (HCC) were also pertinent to this visit.    Code status:   Current Code Status     Date Active Code Status Order ID Comments User Context       12/2/2021 0142 CPR (Attempt to Resuscitate) [id number]  Beena Morgan APRN ED     Advance Care Planning Activity      Questions for Current Code Status     Question Answer    Code Status (Patient has no pulse and is not breathing) CPR (Attempt to Resuscitate)    Medical Interventions (Patient has pulse or is breathing) Full Support          Allergies:   Sulfa antibiotics    Intake and Output    Intake/Output Summary (Last 24 hours) at 12/2/2021 0321  Last data filed at 12/1/2021 2301  Gross per 24 hour   Intake 1000 ml   Output --   Net 1000 ml       Weight:   There were no vitals filed for this visit.    Most recent vitals:   Vitals:    12/02/21 0131 12/02/21 0201 12/02/21 0231 12/02/21 0301   BP: 162/90 146/93 (!) 188/99 175/87   Pulse: 89 81 89    Resp:       Temp:       TempSrc:       SpO2: 94% 96% 95%    Height:           Active LDAs/IV Access:   Lines, Drains & Airways     Active LDAs     Name Placement date Placement time Site Days    Peripheral IV 12/01/21 2230 Right Hand 12/01/21 2230  Hand  less than 1                Labs (abnormal labs have a star):   Labs Reviewed   COMPREHENSIVE METABOLIC PANEL - Abnormal; Notable for the following components:       Result Value    Glucose 617 (*)     BUN 32 (*)     Creatinine 2.09 (*)     Sodium 123 (*)     Chloride 87 (*)     CO2 21.4 (*)     Alkaline Phosphatase 128 (*)     eGFR Non  Amer 23 (*)     All other components within normal limits    Narrative:     GFR Normal >60  Chronic Kidney  Disease <60  Kidney Failure <15     CBC WITH AUTO DIFFERENTIAL - Abnormal; Notable for the following components:    WBC 12.85 (*)     Neutrophil % 76.9 (*)     Lymphocyte % 11.9 (*)     Neutrophils, Absolute 9.88 (*)     All other components within normal limits   URINALYSIS W/ MICROSCOPIC IF INDICATED (NO CULTURE) - Abnormal; Notable for the following components:    Glucose, UA >=1000 mg/dL (3+) (*)     Blood, UA Trace (*)     Protein,  mg/dL (2+) (*)     All other components within normal limits   BLOOD GAS, ARTERIAL - Abnormal; Notable for the following components:    pCO2, Arterial 33.5 (*)     pO2, Arterial 56.9 (*)     Base Excess, Arterial -0.6 (*)     O2 Saturation Calculated 90.6 (*)     All other components within normal limits   POCT GLUCOSE FINGERSTICK - Abnormal; Notable for the following components:    Glucose >599 (*)     All other components within normal limits   POCT GLUCOSE FINGERSTICK - Abnormal; Notable for the following components:    Glucose 515 (*)     All other components within normal limits   POCT GLUCOSE FINGERSTICK - Abnormal; Notable for the following components:    Glucose 517 (*)     All other components within normal limits   POCT GLUCOSE FINGERSTICK - Abnormal; Notable for the following components:    Glucose 360 (*)     All other components within normal limits   COVID-19,[facility] IN-HOUSE CEPHEID/HATTIE, NP SWAB IN TRANSPORT MEDIA 8-12 HR TAT - Normal    Narrative:     Fact sheet for providers: https://www.fda.gov/media/429900/download    Fact sheet for patients: https://www.fda.gov/media/242536/download    Test performed by PCR.   BETA HYDROXYBUTYRATE QUANTITATIVE - Normal    Narrative:     In the assessment of possible diabetic ketoacidosis, the test should be interpreted along with other clinical and laboratory findings.  A level greater than 1 mmol/L should require further evaluation and levels of more than 3 mmol/L require immediate medical review.   TROPONIN (IN-HOUSE) -  Normal    Narrative:     Troponin T Reference Range:  <= 0.03 ng/mL-   Negative for AMI  >0.03 ng/mL-     Abnormal for myocardial necrosis.  Clinicians would have to utilize clinical acumen, EKG, Troponin and serial changes to determine if it is an Acute Myocardial Infarction or myocardial injury due to an underlying chronic condition.       Results may be falsely decreased if patient taking Biotin.     COVID PRE-OP / PRE-PROCEDURE SCREENING ORDER (NO ISOLATION)    Narrative:     The following orders were created for panel order COVID PRE-OP / PRE-PROCEDURE SCREENING ORDER (NO ISOLATION) - Swab, Nasopharynx.  Procedure                               Abnormality         Status                     ---------                               -----------         ------                     [facility][[id number]]  Normal              Final result                 Please view results for these tests on the individual orders.   BLOOD GAS, VENOUS   URINALYSIS, MICROSCOPIC ONLY   SCAN SLIDE   BLOOD GAS, ARTERIAL   BLOOD GAS, VENOUS   BASIC METABOLIC PANEL   CBC (NO DIFF)   HEMOGLOBIN A1C   POCT GLUCOSE FINGERSTICK   POCT GLUCOSE FINGERSTICK   POCT GLUCOSE FINGERSTICK   POCT GLUCOSE FINGERSTICK   POCT GLUCOSE FINGERSTICK   CBC AND DIFFERENTIAL    Narrative:     The following orders were created for panel order CBC & Differential.  Procedure                               Abnormality         Status                     ---------                               -----------         ------                     CBC Auto Differential[[id number]]        Abnormal            Final result               Scan Slide[[id number]]                                       Final result                 Please view results for these tests on the individual orders.   KETONE BODIES SERUM    Narrative:     The following orders were created for panel order Ketone Bodies, Serum (Not performed at [place]).  Procedure                                Abnormality         Status                     ---------                               -----------         ------                     Beta Hydroxybutyrate [place]...[[id number]]  Normal              Final result                 Please view results for these tests on the individual orders.       EKG:   ECG 12 Lead   Preliminary Result   HEART RATE= 94  bpm   RR Interval= 640  ms   DC Interval= 159  ms   P Horizontal Axis= -61  deg   P Front Axis= 29  deg   QRSD Interval= 140  ms   QT Interval= 402  ms   QRS Axis= 77  deg   T Wave Axis= -52  deg   - ABNORMAL ECG -   Sinus rhythm   Probable left atrial enlargement   Right bundle branch block   ST depr, consider ischemia, inferior leads   Electronically Signed By:    Date and Time of Study: 2021-12-01 22:27:52          Meds given in ED:   Medications   sodium chloride 0.9 % flush 10 mL (10 mL Intravenous Given 12/1/21 4976)   sodium chloride 0.9 % flush 10 mL (has no administration in time range)   sodium chloride 0.9 % flush 10 mL (has no administration in time range)   nitroglycerin (NITROSTAT) SL tablet 0.4 mg (has no administration in time range)   acetaminophen (TYLENOL) tablet 650 mg (has no administration in time range)     Or   acetaminophen (TYLENOL) 160 MG/5ML solution 650 mg (has no administration in time range)     Or   acetaminophen (TYLENOL) suppository 650 mg (has no administration in time range)   sodium chloride 0.9 % infusion (has no administration in time range)   ondansetron (ZOFRAN) tablet 4 mg (has no administration in time range)     Or   ondansetron (ZOFRAN) injection 4 mg (has no administration in time range)   calcium carbonate (TUMS) chewable tablet 500 mg (200 mg elemental) (has no administration in time range)   dextrose (GLUTOSE) oral gel 15 g (has no administration in time range)   dextrose (D50W) (25 g/50 mL) IV injection 25 g (has no administration in time range)   glucagon (human recombinant) (GLUCAGEN DIAGNOSTIC) injection 1 mg (has no  administration in time range)   insulin regular (humuLIN R,novoLIN R) injection 0-14 Units (has no administration in time range)   lactated ringers bolus 1,000 mL (0 mL Intravenous Stopped 21)   labetalol (NORMODYNE,TRANDATE) injection 10 mg (10 mg Intravenous Given 21 2257)   insulin regular (humuLIN R,novoLIN R) injection 10 Units (10 Units Intravenous Given 21 2328)   insulin regular (humuLIN R,novoLIN R) injection 10 Units (10 Units Intravenous Given 21 0049)       Imaging results:  CT Head Without Contrast    Result Date: 2021  No acute intracranial findings.  Radiation dose reduction techniques were utilized, including automated exposure control and exposure modulation based on body size.  This report was finalized on 2021 11:53 PM by Dr. Kristina Forman M.D.      XR Chest 1 View    Result Date: 2021  Mild diffuse interstitial prominence may reflect some vascular congestion.  This report was finalized on 2021 10:57 PM by Dr. Kristina Forman M.D.        Ambulatory status:   - up with assistance     Social issues:   Social History     Socioeconomic History   • Marital status:    Tobacco Use   • Smoking status: Former Smoker     Packs/day: 1.00     Quit date:      Years since quittin.9   • Smokeless tobacco: Never Used   Vaping Use   • Vaping Use: Never used   Substance and Sexual Activity   • Alcohol use: No   • Drug use: No   • Sexual activity: Rupali Farrell RN  21 [id number]

## 2021-12-02 NOTE — ED PROVIDER NOTES
EMERGENCY DEPARTMENT ENCOUNTER    Room Number:  15/15  Date of encounter:  12/2/2021  PCP: Napoleon Mead MD  Historian: Patient and son      HPI:  Chief Complaint: Weakness and elevated blood glucose  A complete HPI/ROS/PMH/PSH/SH/FH are unobtainable due to: Patient condition    Context: Halie Guidry is a 81 y.o. female with past medical history of diabetes, hypertension, hyperlipidemia and hypothyroidism who presents to the ED c/o weakness.  Most of the history obtained from the son, as the patient is unable to provide history at this point.  He states that she started feeling bad yesterday and then got much worse today with weakness to where she was unable to ambulate on her own, elevated glucose, trembling all over, and very confused.  Patient did have an episode about 4 years ago when her sugar got over 700, and she does get an occasional urinary tract infection which can make her confused.  Patient denies any chest pain, shortness of breath, or headache.      PAST MEDICAL HISTORY  Active Ambulatory Problems     Diagnosis Date Noted   • Compression fracture 04/22/2009   • Lumbar degenerative disc disease 07/05/2012   • Diabetes mellitus, type 2 (HCC)    • Hyperlipidemia    • Benign essential hypertension 08/20/2014   • Primary hypothyroidism    • Leukocytosis    • Neuropathy    • Osteoporosis 09/09/2015   • Proteinuria 02/28/2012   • Tobacco abuse 08/22/2013   • Diabetic eye exam (HCC) 02/12/2014   • Generalized weakness 05/27/2017   • Spinal stenosis 05/27/2017   • Scoliosis 05/27/2017   • Arthritis 05/27/2017   • VBI (vertebrobasilar insufficiency) 05/28/2017   • Orthostatic hypotension 05/28/2017   • 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 (HCC) 05/28/2017   • Severe hypothyroidism 05/30/2017   • Noncompliance 05/30/2017   • Vitamin D deficiency disease 06/01/2017   • Altered mental status 12/15/2017   • Tremor 01/09/2018   • Seizure ([facility]) 05/21/2019   • Chronic kidney disease, stage  2 (mild) 2012   • Thoracic degenerative disc disease 2020     Resolved Ambulatory Problems     Diagnosis Date Noted   • No Resolved Ambulatory Problems     Past Medical History:   Diagnosis Date   • Anxiety    • 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HCC)    • Depression    • Diabetes (HCC)    • Disc degeneration, lumbar    • Headache, tension-type    • Hypothyroidism    • Peripheral neuropathy    • Rotator cuff tear, left    • Shoulder pain          PAST SURGICAL HISTORY  Past Surgical History:   Procedure Laterality Date   • APPENDECTOMY     • BILATERAL BREAST REDUCTION Bilateral 2015   • CATARACT EXTRACTION  2015   • COLONOSCOPY  2015    WNL   • KYPHOPLASTY     • REDUCTION MAMMAPLASTY     • TONSILLECTOMY           FAMILY HISTORY  Family History   Problem Relation Age of Onset   • Bone cancer Mother    • No Known Problems Father          SOCIAL HISTORY  Social History     Socioeconomic History   • Marital status:    Tobacco Use   • Smoking status: Former Smoker     Packs/day: 1.00     Quit date:      Years since quittin.9   • Smokeless tobacco: Never Used   Vaping Use   • Vaping Use: Never used   Substance and Sexual Activity   • Alcohol use: No   • Drug use: No   • Sexual activity: Defer         ALLERGIES  Sulfa antibiotics        REVIEW OF SYSTEMS  Review of Systems     All systems reviewed and negative except for those discussed in HPI.       PHYSICAL EXAM    I have reviewed the triage vital signs and nursing notes.    ED Triage Vitals   Temp Heart Rate Resp BP SpO2   218 21 2147 21 2147 21 21521   99.9 °F (37.7 °C) 92 20 (!) 199/142 92 %      Temp src Heart Rate Source Patient Position BP Location FiO2 (%)   21 -- -- -- --   Oral           Physical Exam  GENERAL: Alert, confused, ill-appearing   HENT: dry mucous membranes  EYES: no scleral icterus, PERRL, EOMI  CV: regular rhythm, regular rate  RESPIRATORY: normal effort  ABDOMEN:  soft/nontender  MUSCULOSKELETAL: no deformity  NEURO: alert, moves all extremities, no focal neuro deficits, follows commands  SKIN: warm, dry, no rashes        LAB RESULTS  Recent Results (from the past 24 hour(s))   POC Glucose Once    Collection Time: 12/01/21 10:01 PM    Specimen: Blood   Result Value Ref Range    Glucose >599 (C) 70 - 130 mg/dL   Comprehensive Metabolic Panel    Collection Time: 12/01/21 10:22 PM    Specimen: Blood   Result Value Ref Range    Glucose 617 (C) 65 - 99 mg/dL    BUN 32 (H) 8 - 23 mg/dL    Creatinine 2.09 (H) 0.57 - 1.00 mg/dL    Sodium 123 (L) 136 - 145 mmol/L    Potassium 4.6 3.5 - 5.2 mmol/L    Chloride 87 (L) 98 - 107 mmol/L    CO2 21.4 (L) 22.0 - 29.0 mmol/L    Calcium 9.5 8.6 - 10.5 mg/dL    Total Protein 8.0 6.0 - 8.5 g/dL    Albumin 4.50 3.50 - 5.20 g/dL    ALT (SGPT) 14 1 - 33 U/L    AST (SGOT) 14 1 - 32 U/L    Alkaline Phosphatase 128 (H) 39 - 117 U/L    Total Bilirubin 0.7 0.0 - 1.2 mg/dL    eGFR Non African Amer 23 (L) >60 mL/min/1.73    Globulin 3.5 gm/dL    A/G Ratio 1.3 g/dL    BUN/Creatinine Ratio 15.3 7.0 - 25.0    Anion Gap 14.6 5.0 - 15.0 mmol/L   CBC Auto Differential    Collection Time: 12/01/21 10:22 PM    Specimen: Blood   Result Value Ref Range    WBC 12.85 (H) 3.40 - 10.80 10*3/mm3    RBC 4.92 3.77 - 5.28 10*6/mm3    Hemoglobin 14.3 12.0 - 15.9 g/dL    Hematocrit 45.2 34.0 - 46.6 %    MCV 91.9 79.0 - 97.0 fL    MCH 29.1 26.6 - 33.0 pg    MCHC 31.6 31.5 - 35.7 g/dL    RDW 13.1 12.3 - 15.4 %    RDW-SD 44.6 37.0 - 54.0 fl    MPV 10.6 6.0 - 12.0 fL    Platelets 319 140 - 450 10*3/mm3    Neutrophil % 76.9 (H) 42.7 - 76.0 %    Lymphocyte % 11.9 (L) 19.6 - 45.3 %    Monocyte % 7.0 5.0 - 12.0 %    Eosinophil % 2.6 0.3 - 6.2 %    Basophil % 1.1 0.0 - 1.5 %    Neutrophils, Absolute 9.88 (H) 1.70 - 7.00 10*3/mm3    Lymphocytes, Absolute 1.53 0.70 - 3.10 10*3/mm3    Monocytes, Absolute 0.90 0.10 - 0.90 10*3/mm3    Eosinophils, Absolute 0.33 0.00 - 0.40 10*3/mm3     Basophils, Absolute 0.14 0.00 - 0.20 10*3/mm3   Beta Hydroxybutyrate Quantitative    Collection Time: 12/01/21 10:22 PM    Specimen: Blood   Result Value Ref Range    Beta-Hydroxybutyrate Quant 0.245 0.020 - 0.270 mmol/L   Troponin    Collection Time: 12/01/21 10:22 PM    Specimen: Blood   Result Value Ref Range    Troponin T <0.010 0.000 - 0.030 ng/mL   ECG 12 Lead    Collection Time: 12/01/21 10:27 PM   Result Value Ref Range    QT Interval 402 ms   Urinalysis With Microscopic If Indicated (No Culture) - Urine, Clean Catch    Collection Time: 12/01/21 10:36 PM    Specimen: Urine, Clean Catch   Result Value Ref Range    Color, UA Yellow Yellow, Straw    Appearance, UA Clear Clear    pH, UA 6.0 5.0 - 8.0    Specific Gravity, UA 1.024 1.005 - 1.030    Glucose, UA >=1000 mg/dL (3+) (A) Negative    Ketones, UA Negative Negative    Bilirubin, UA Negative Negative    Blood, UA Trace (A) Negative    Protein,  mg/dL (2+) (A) Negative    Leuk Esterase, UA Negative Negative    Nitrite, UA Negative Negative    Urobilinogen, UA 0.2 E.U./dL 0.2 - 1.0 E.U./dL   Urinalysis, Microscopic Only - Urine, Clean Catch    Collection Time: 12/01/21 10:36 PM    Specimen: Urine, Clean Catch   Result Value Ref Range    RBC, UA 0-2 None Seen, 0-2 /HPF    WBC, UA 0-2 None Seen, 0-2 /HPF    Bacteria, UA None Seen None Seen /HPF    Squamous Epithelial Cells, UA 0-2 None Seen, 0-2 /HPF    Hyaline Casts, UA 0-2 None Seen /LPF    Methodology Automated Microscopy    COVID-19,[facility] IN-HOUSE CEPHEID/HATTIE NP SWAB IN TRANSPORT MEDIA 8-12 HR TAT - Swab, Nasopharynx    Collection Time: 12/01/21 11:28 PM    Specimen: Nasopharynx; Swab   Result Value Ref Range    COVID19 Not Detected Not Detected - Ref. Range   POC Glucose Once    Collection Time: 12/02/21 12:17 AM    Specimen: Blood   Result Value Ref Range    Glucose 515 (C) 70 - 130 mg/dL   POC Glucose Once    Collection Time: 12/02/21 12:19 AM    Specimen: Blood   Result Value Ref Range     Glucose 517 (C) 70 - 130 mg/dL       Ordered the above labs and independently reviewed the results.        RADIOLOGY  CT Head Without Contrast    Result Date: 12/1/2021  CT HEAD WITHOUT CONTRAST  HISTORY: Hyperglycemia  COMPARISON: 12/15/2017  TECHNIQUE: Axial CT imaging was obtained through the brain. No IV contrast was administered.  FINDINGS: No acute intracranial hemorrhage is seen. There is diffuse atrophy. There is periventricular and deep white matter microangiopathic change. There is no midline shift or mass effect. 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 No calvarial fracture is seen. There is some mucosal thickening seen within the ethmoid sinuses. Mastoid air cells appear clear.      No acute intracranial findings.  Radiation dose reduction techniques were utilized, including automated exposure control and exposure modulation based on body size.  This report was finalized on 12/1/2021 11:53 PM by Dr. Kristina Forman M.D.      XR Chest 1 View    Result Date: 12/1/2021  SINGLE VIEW OF THE CHEST  HISTORY: Altered mental status  COMPARISON: Similar 15 2017  FINDINGS: Heart size is within normal limits for technique. No pneumothorax or pleural effusion is seen. There is some calcification of the aorta. Patient does appear to have some mild diffuse interstitial prominence, which could reflect some vascular congestion. There is some bibasilar atelectasis.      Mild diffuse interstitial prominence may reflect some vascular congestion.  This report was finalized on 12/1/2021 10:57 PM by Dr. Kristina Forman M.D.        I ordered the above noted radiological studies. Reviewed by me and discussed with radiologist.  See dictation for official radiology interpretation.      PROCEDURES    Procedures      MEDICATIONS GIVEN IN ER    Medications   sodium chloride 0.9 % flush 10 mL (10 mL Intravenous Given 12/1/21 5734)   lactated ringers bolus 1,000 mL (0 mL Intravenous Stopped 12/1/21  2301)   labetalol (NORMODYNE,TRANDATE) injection 10 mg (10 mg Intravenous Given 12/1/21 2257)   insulin regular (humuLIN R,novoLIN R) injection 10 Units (10 Units Intravenous Given 12/1/21 2328)   insulin regular (humuLIN R,novoLIN R) injection 10 Units (10 Units Intravenous Given 12/2/21 0049)         PROGRESS, DATA ANALYSIS, CONSULTS, AND MEDICAL DECISION MAKING    All labs have been independently reviewed by me.  All radiology studies have been reviewed by me and discussed with radiologist dictating the report.   EKG's independently viewed and interpreted by me.  Discussion below represents my analysis of pertinent findings related to patient's condition, differential diagnosis, treatment plan and final disposition.    DDx: Includes but not limited to UTI, DKA, HHS, electrolyte disturbance, metabolic acidosis    ED Course as of 12/02/21 0114   Wed Dec 01, 2021   2211 Glucose(!!): >599 [TD]   2232 EKG interpreted by myself.  Time 2227.  Sinus rhythm.  Heart rate 94.  Left atrial normality.  Normal axis and intervals.  Diffuse ST depression and T wave inversions.   [TD]   2254 Troponin T: <0.010 [TD]   2316 Glucose(!!): 617 [RICKEY]   2316 BUN(!): 32 [RICKEY]   2316 Creatinine(!): 2.09 [RICKEY]   2316 Sodium(!): 123  Sodium corrects to 135. [RICKEY]   2316 Potassium: 4.6 [RICKEY]   2316 Chloride(!): 87 [RICKEY]   2316 CO2(!): 21.4 [RICKEY]   2322 Anion Gap: 14.6 [TD]   2322 Creatinine(!): 2.09 [TD]      ED Course User Index  [RICKEY] Ulisses Lemus PA  [TD] Bertram Orona II, MD       MDM: Patient will be admitted for metabolic cephalopathy with hyperglycemia and acute kidney injury for further evaluation.    PPE: The patient wore a surgical mask throughout the entire patient encounter. I wore an N95.    AS OF 01:14 EST VITALS:    BP - 167/97  HR - 88  TEMP - 99.9 °F (37.7 °C) (Oral)  O2 SATS - 97%        DIAGNOSIS  Final diagnoses:   Metabolic encephalopathy   Hyperglycemia   VIPUL (acute kidney injury) (HCC)          DISPOSITION  ADMISSION    Discussed treatment plan and reason for admission with pt/family and admitting physician.  Pt/family voiced understanding of the plan for admission for further testing/treatment as needed.          Ulisses Lemus PA  12/02/21 0114

## 2021-12-02 NOTE — PLAN OF CARE
Goal Outcome Evaluation:   Patient alert follows commands forgetful in fluids infusing. Admitted from er database and assessment completed. Pain med x1 dose. No nausea noted. Cell phone and cell phone  and dentures with patient. Son tp bring in med list to verify list in epic, patient agrees with med rec on [facility]. Patient needs flu vaccine. No acute distress noted will continue to monitor

## 2021-12-02 NOTE — ED PROVIDER NOTES
EMERGENCY DEPARTMENT ENCOUNTER    Room Number:  15/15  Date of encounter:  12/1/2021  PCP: Napoleon Mead MD  Historian: Patient, son      HPI:  Chief Complaint: High blood sugar  A complete HPI/ROS/PMH/PSH/SH/FH are unobtainable due to: Confusion    Context: Halie Guidry is a 81 y.o. female who presents to the ED c/o high blood sugar.  Patient states that she has been fatigued for the past 2 days with worsening fatigue.  Son states that she was found to have high blood sugar and therefore he brought her to the ER.  Patient and son deny any fever, cough, chest pain, shortness of breath.  No abdominal pain, vomiting, or diarrhea.      PAST MEDICAL HISTORY  Active Ambulatory Problems     Diagnosis Date Noted   • Compression fracture 04/22/2009   • Lumbar degenerative disc disease 07/05/2012   • Diabetes mellitus, type 2 ([facility])    • Hyperlipidemia    • Benign essential hypertension 08/20/2014   • Primary hypothyroidism    • Leukocytosis    • Neuropathy    • Osteoporosis 09/09/2015   • Proteinuria 02/28/2012   • Tobacco abuse 08/22/2013   • Diabetic eye exam (HCC) 02/12/2014   • Generalized weakness 05/27/2017   • Spinal stenosis 05/27/2017   • Scoliosis 05/27/2017   • Arthritis 05/27/2017   • VBI (vertebrobasilar insufficiency) 05/28/2017   • Orthostatic hypotension 05/28/2017   • 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 ([facility]) 05/28/2017   • Severe hypothyroidism 05/30/2017   • Noncompliance 05/30/2017   • Vitamin D deficiency disease 06/01/2017   • Altered mental status 12/15/2017   • Tremor 01/09/2018   • Seizure ([facility]) 05/21/2019   • Chronic kidney disease, stage 2 (mild) 03/09/2012   • Thoracic degenerative disc disease 01/27/2020     Resolved Ambulatory Problems     Diagnosis Date Noted   • No Resolved Ambulatory Problems     Past Medical History:   Diagnosis Date   • Anxiety    • 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[facility])    • Depression    • Diabetes ([facility])    • Disc degeneration, lumbar    • Headache,  tension-type    • Hypothyroidism    • Peripheral neuropathy    • Rotator cuff tear, left    • Shoulder pain          PAST SURGICAL HISTORY  Past Surgical History:   Procedure Laterality Date   • APPENDECTOMY     • BILATERAL BREAST REDUCTION Bilateral 2015   • CATARACT EXTRACTION  2015   • COLONOSCOPY  2015    WNL   • KYPHOPLASTY     • REDUCTION MAMMAPLASTY     • TONSILLECTOMY           FAMILY HISTORY  Family History   Problem Relation Age of Onset   • Bone cancer Mother    • No Known Problems Father          SOCIAL HISTORY  Social History     Socioeconomic History   • Marital status:    Tobacco Use   • Smoking status: Former Smoker     Packs/day: 1.00     Quit date:      Years since quittin.9   • Smokeless tobacco: Never Used   Vaping Use   • Vaping Use: Never used   Substance and Sexual Activity   • Alcohol use: No   • Drug use: No   • Sexual activity: Defer         ALLERGIES  Sulfa antibiotics        REVIEW OF SYSTEMS  Review of Systems     All systems reviewed and negative except for those discussed in HPI.       PHYSICAL EXAM    I have reviewed the triage vital signs and nursing notes.    ED Triage Vitals [21 2147]   Temp Heart Rate Resp BP SpO2   -- 92 20 -- 92 %      Temp src Heart Rate Source Patient Position BP Location FiO2 (%)   -- -- -- -- --       Physical Exam  GENERAL: not distressed  HENT: nares patent, mucous membranes dry  EYES: no scleral icterus  CV: regular rhythm, regular rate  RESPIRATORY: normal effort, clear to auscultation bilaterally  ABDOMEN: soft, nontender  MUSCULOSKELETAL: no deformity  NEURO: alert, oriented to name and hospital.  Unsure of the year.  She knows that [place] is approaching.  Moves all extremities, follows simple commands  SKIN: warm, dry        LAB RESULTS  Recent Results (from the past 24 hour(s))   POC Glucose Once    Collection Time: 21 10:01 PM    Specimen: Blood   Result Value Ref Range    Glucose >599 (C) 70 - 130 mg/dL        Ordered the above labs and independently reviewed the results.        RADIOLOGY  No Radiology Exams Resulted Within Past 24 Hours    I ordered the above noted radiological studies. Reviewed by me and discussed with radiologist.  See dictation for official radiology interpretation.      PROCEDURES    Procedures      MEDICATIONS GIVEN IN ER    Medications   sodium chloride 0.9 % flush 10 mL (has no administration in time range)   lactated ringers bolus 1,000 mL (has no administration in time range)         PROGRESS, DATA ANALYSIS, CONSULTS, AND MEDICAL DECISION MAKING    All labs have been independently reviewed by me.  All radiology studies have been reviewed by me and discussed with radiologist dictating the report.   EKG's independently viewed and interpreted by me.  Discussion below represents my analysis of pertinent findings related to patient's condition, differential diagnosis, treatment plan and final disposition.    Differential diagnosis includes DKA, hyperglycemia, HHS.  I am suspicious about the possibility of infectious etiology, medication noncompliance, ACS.    ED Course as of 12/05/21 1706   Wed Dec 01, 2021   2211 Glucose(!!): >599 [TD]   2232 EKG interpreted by myself.  Time 2227.  Sinus rhythm.  Heart rate 94.  Left atrial normality.  Normal axis and intervals.  Diffuse ST depression and T wave inversions.   [TD]   2254 Troponin T: <0.010 [TD]   2316 Glucose(!!): 617 [RICKEY]   2316 BUN(!): 32 [RICKEY]   2316 Creatinine(!): 2.09 [RICKEY]   2316 Sodium(!): 123  Sodium corrects to 135. [RICKEY]   2316 Potassium: 4.6 [RICKEY]   2316 Chloride(!): 87 [RICKEY]   2316 CO2(!): 21.4 [RICKEY]   2322 Anion Gap: 14.6 [TD]   2322 Creatinine(!): 2.09 [TD]      ED Course User Index  [RICKEY] Ulisses Lemus PA  [TD] Bertram Orona II, MD     I discussed the case with SERGE Conley for [facility].  Reviewed the patient's labs and imaging.  She will admit.      PPE: The patient wore a surgical mask throughout the entire patient  encounter. I wore an N95.    AS OF 22:09 EST VITALS:    BP -    HR - 92  TEMP -    O2 SATS - 92%        DIAGNOSIS  Final diagnoses:   Metabolic encephalopathy   Hyperglycemia   VIPUL (acute kidney injury) (HCC)         DISPOSITION  Admit           Bertram Orona II, MD  12/05/21 0155

## 2021-12-02 NOTE — H&P
Patient Name:  Halie Guidry  YOB: 1940  MRN:  [id number]  Admit Date:  12/1/2021  Patient Care Team:  Napoleon Mead MD as PCP - General  Napoleon Mead MD as PCP - Family Medicine      Subjective   History Present Illness     Chief Complaint   Patient presents with   • Hyperglycemia       Ms. Guidry is a 81 y.o. former smoker with a history of type 2 diabetes mellitus, hypertension, hyperlipidemia, and hypothyroidism that presents to [facility] complaining of altered mental status and hyperglycemia.  She is a poor historian on exam, so history is partially obtained from her son at bedside.  He reports he noticed she was confused yesterday evening.  He states the last time she was confused, it was due to her sugar being high.  He states he called a friend over to check her blood sugar and the machine could not read the value because it was too high, so he brought her to the ED.  The patient reports generalized weakness for the past few days and abdominal discomfort.  She denies nausea, vomiting, chest pain, and shortness of breath.  On arrival to the ED, her blood glucose was 617.  She received IV insulin in the ED.      History of Present Illness  Review of Systems   Constitutional: Negative for chills and fever.   HENT: Negative for congestion and sore throat.    Eyes: Negative for photophobia and visual disturbance.   Respiratory: Negative for cough and shortness of breath.    Cardiovascular: Negative for chest pain, palpitations and leg swelling.   Gastrointestinal: Positive for abdominal pain. Negative for nausea and vomiting.   Endocrine: Negative for polydipsia, polyphagia and polyuria.   Genitourinary: Positive for difficulty urinating. Negative for decreased urine volume, dysuria, flank pain, frequency, hematuria and urgency.   Musculoskeletal: Negative for back pain and neck pain.   Skin: Negative for rash and wound.   Neurological: Positive for weakness.  Negative for dizziness, light-headedness, numbness and headaches.   Psychiatric/Behavioral: Positive for confusion.        Personal History     Past Medical History:   Diagnosis Date   • Anxiety    • Arthritis    • Benign essential hypertension 2014   • 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HCC)    • Depression    • Diabetes (HCC)    • Diabetes mellitus, type 2 (HCC)    • Disc degeneration, lumbar    • Headache, tension-type    • Hyperlipidemia    • Hypothyroidism    • Neuropathy    • Osteoporosis 2015   • Peripheral neuropathy    • Rotator cuff tear, left    • Scoliosis    • Shoulder pain     LEFT, TORN ROTATOR CUFF S/P FALL   • Spinal stenosis      Past Surgical History:   Procedure Laterality Date   • APPENDECTOMY     • BILATERAL BREAST REDUCTION Bilateral 2015   • CATARACT EXTRACTION  2015   • COLONOSCOPY  2015    WNL   • KYPHOPLASTY     • REDUCTION MAMMAPLASTY     • TONSILLECTOMY       Family History   Problem Relation Age of Onset   • Bone cancer Mother    • No Known Problems Father      Social History     Tobacco Use   • Smoking status: Former Smoker     Packs/day: 1.00     Quit date:      Years since quittin.9   • Smokeless tobacco: Never Used   Vaping Use   • Vaping Use: Never used   Substance Use Topics   • Alcohol use: No   • Drug use: No     Medications Prior to Admission   Medication Sig Dispense Refill Last Dose   • ALPRAZolam (XANAX) 0.5 MG tablet Take 1 tab prior to the MRI and may repeat x 1 prn 2 tablet 0    • atorvastatin (LIPITOR) 80 MG tablet TAKE 1 TABLET EVERY NIGHT 90 tablet 3    • bisoprolol (ZEBeta) 5 MG tablet Take 1 tablet by mouth Daily. 90 tablet 3    • hydroCHLOROthiazide (HYDRODIURIL) 12.5 MG tablet Take 1 tablet by mouth Daily. 90 tablet 3    • HYDROcodone-acetaminophen (NORCO)  MG per tablet TK 1 T PO TID      • Insulin Glargine (TOUJEO SOLOSTAR) 300 UNIT/ML solution pen-injector Inject 60 Units under the skin into the appropriate area as directed every night at  bedtime. 20 pen 3    • Insulin Lispro, 1 Unit Dial, (HUMALOG) 100 UNIT/ML solution pen-injector Inject 6 Units under the skin into the appropriate area as directed 4  Times a Day With Meals. Breakfast lunch dinner and snack 15 pen 3    • Insulin Pen Needle 32G X 4 MM misc Use to inject insulin 4 TIMES DAILY 200 each 0    • ketoconazole (NIZORAL) 2 % cream       • lansoprazole (PREVACID) 30 MG capsule Take 30 mg by mouth Daily.      • levothyroxine (SYNTHROID, LEVOTHROID) 112 MCG tablet TAKE 1 TABLET BY MOUTH DAILY 90 tablet 1    • lisinopril (PRINIVIL,ZESTRIL) 5 MG tablet Take 1 tablet by mouth Daily. 90 tablet 3    • pregabalin (LYRICA) 50 MG capsule TAKE 1 CAPSULE BY MOUTH THREE TIMES DAILY 270 capsule 0    • Semaglutide,0.25 or 0.5MG/DOS, (Ozempic, 0.25 or 0.5 MG/DOSE,) 2 MG/1.5ML solution pen-injector Inject 0.5 mg under the skin into the appropriate area as directed 1 (One) Time Per Week for 90 days. Stop victoza 1 pen 2    • Toujeo SoloStar 300 UNIT/ML solution pen-injector injection INJECT 60 UNITS UNDER THE SKIN INTO THE APPROPRIATE AREA AS DIRECTED EVERY NIGHT AT BEDTIME 27 mL 1      Allergies:    Allergies   Allergen Reactions   • Sulfa Antibiotics        Objective    Objective     Vital Signs  Temp:  [99.8 °F (37.7 °C)-99.9 °F (37.7 °C)] 99.8 °F (37.7 °C)  Heart Rate:  [81-92] 88  Resp:  [20] 20  BP: (146-230)/() 166/81  SpO2:  [90 %-97 %] 90 %  on   ;   Device (Oxygen Therapy): room air  Body mass index is 28.8 kg/m².    Physical Exam  Vitals and nursing note reviewed.   Constitutional:       Appearance: Normal appearance.   HENT:      Head: Normocephalic and atraumatic.      Nose: Nose normal.      Mouth/Throat:      Mouth: Mucous membranes are dry.      Pharynx: Oropharynx is clear.   Eyes:      Extraocular Movements: Extraocular movements intact.      Conjunctiva/sclera: Conjunctivae normal.   Cardiovascular:      Rate and Rhythm: Normal rate and regular rhythm.      Pulses: Normal pulses.       Heart sounds: Normal heart sounds.   Pulmonary:      Effort: Pulmonary effort is normal.      Breath sounds: Normal breath sounds.   Abdominal:      General: Bowel sounds are normal. There is no distension.      Palpations: Abdomen is soft. There is no mass.      Tenderness: There is abdominal tenderness. There is no guarding or rebound.      Hernia: No hernia is present.   Musculoskeletal:         General: No swelling. Normal range of motion.      Cervical back: Normal range of motion and neck supple.   Skin:     General: Skin is warm and dry.   Neurological:      General: No focal deficit present.      Mental Status: She is alert. She is confused.      Comments: Generalized weakness   Psychiatric:         Mood and Affect: Mood normal.         Behavior: Behavior normal.         Results Review:  I reviewed the patient's new clinical results.  I reviewed the patient's new imaging results and agree with the interpretation.  I reviewed the patient's other test results and agree with the interpretation  I personally viewed and interpreted the patient's EKG/Telemetry data  Discussed with ED provider.    Lab Results (last 24 hours)     Procedure Component Value Units Date/Time    POC Glucose Once [[id number]]  (Abnormal) Collected: 12/01/21 2201    Specimen: Blood Updated: 12/01/21 2202     Glucose >599 mg/dL      Comment: LAB DRAW Meter: [id number] : [id number] Napoleon COYNE       CBC & Differential [[id number]]  (Abnormal) Collected: 12/01/21 2222    Specimen: Blood Updated: 12/01/21 2325    Narrative:      The following orders were created for panel order CBC & Differential.  Procedure                               Abnormality         Status                     ---------                               -----------         ------                     CBC Auto Differential[[id number]]        Abnormal            Final result               Scan Slide[[id number]]                                       Final result                  Please view results for these tests on the individual orders.    Comprehensive Metabolic Panel [[id number]]  (Abnormal) Collected: 12/01/21 2222    Specimen: Blood Updated: 12/01/21 2315     Glucose 617 mg/dL      BUN 32 mg/dL      Creatinine 2.09 mg/dL      Sodium 123 mmol/L      Potassium 4.6 mmol/L      Comment: Slight hemolysis detected by analyzer. Results may be affected.        Chloride 87 mmol/L      CO2 21.4 mmol/L      Calcium 9.5 mg/dL      Total Protein 8.0 g/dL      Albumin 4.50 g/dL      ALT (SGPT) 14 U/L      AST (SGOT) 14 U/L      Alkaline Phosphatase 128 U/L      Total Bilirubin 0.7 mg/dL      eGFR Non African Amer 23 mL/min/1.73      Globulin 3.5 gm/dL      A/G Ratio 1.3 g/dL      BUN/Creatinine Ratio 15.3     Anion Gap 14.6 mmol/L     Narrative:      GFR Normal >60  Chronic Kidney Disease <60  Kidney Failure <15      Ketone Bodies, Serum (Not performed at [place]) [[id number]]  (Normal) Collected: 12/01/21 2222    Specimen: Blood Updated: 12/01/21 2253    Narrative:      The following orders were created for panel order Ketone Bodies, Serum (Not performed at [place]).  Procedure                               Abnormality         Status                     ---------                               -----------         ------                     Beta Hydroxybutyrate Campbell...[[id number]]  Normal              Final result                 Please view results for these tests on the individual orders.    CBC Auto Differential [[id number]]  (Abnormal) Collected: 12/01/21 2222    Specimen: Blood Updated: 12/01/21 2325     WBC 12.85 10*3/mm3      RBC 4.92 10*6/mm3      Hemoglobin 14.3 g/dL      Hematocrit 45.2 %      MCV 91.9 fL      MCH 29.1 pg      MCHC 31.6 g/dL      RDW 13.1 %      RDW-SD 44.6 fl      MPV 10.6 fL      Platelets 319 10*3/mm3      Neutrophil % 76.9 %      Lymphocyte % 11.9 %      Monocyte % 7.0 %      Eosinophil % 2.6 %      Basophil % 1.1 %      Neutrophils, Absolute 9.88 10*3/mm3       Lymphocytes, Absolute 1.53 10*3/mm3      Monocytes, Absolute 0.90 10*3/mm3      Eosinophils, Absolute 0.33 10*3/mm3      Basophils, Absolute 0.14 10*3/mm3     Beta Hydroxybutyrate Quantitative [[id number]]  (Normal) Collected: 12/01/21 2222    Specimen: Blood Updated: 12/01/21 2253     Beta-Hydroxybutyrate Quant 0.245 mmol/L     Narrative:      In the assessment of possible diabetic ketoacidosis, the test should be interpreted along with other clinical and laboratory findings.  A level greater than 1 mmol/L should require further evaluation and levels of more than 3 mmol/L require immediate medical review.    Troponin [[id number]]  (Normal) Collected: 12/01/21 2222    Specimen: Blood Updated: 12/01/21 2249     Troponin T <0.010 ng/mL     Narrative:      Troponin T Reference Range:  <= 0.03 ng/mL-   Negative for AMI  >0.03 ng/mL-     Abnormal for myocardial necrosis.  Clinicians would have to utilize clinical acumen, EKG, Troponin and serial changes to determine if it is an Acute Myocardial Infarction or myocardial injury due to an underlying chronic condition.       Results may be falsely decreased if patient taking Biotin.      Scan Slide [[id number]] Collected: 12/01/21 2222    Specimen: Blood Updated: 12/01/21 2325    Blood Gas, Venous - [[id number]] Collected: 12/01/21 2223    Specimen: Venous Blood Updated: 12/01/21 2225    Urinalysis With Microscopic If Indicated (No Culture) - Urine, Clean Catch [[id number]]  (Abnormal) Collected: 12/01/21 2236    Specimen: Urine, Clean Catch Updated: 12/01/21 2301     Color, UA Yellow     Appearance, UA Clear     pH, UA 6.0     Specific Gravity, UA 1.024     Glucose, UA >=1000 mg/dL (3+)     Ketones, UA Negative     Bilirubin, UA Negative     Blood, UA Trace     Protein,  mg/dL (2+)     Leuk Esterase, UA Negative     Nitrite, UA Negative     Urobilinogen, UA 0.2 E.U./dL    Urinalysis, Microscopic Only - Urine, Clean Catch [[id number]] Collected: 12/01/21 2236    Specimen:  Urine, Clean Catch Updated: 12/01/21 2302     RBC, UA 0-2 /HPF      WBC, UA 0-2 /HPF      Bacteria, UA None Seen /HPF      Squamous Epithelial Cells, UA 0-2 /HPF      Hyaline Casts, UA 0-2 /LPF      Methodology Automated Microscopy    COVID PRE-OP / PRE-PROCEDURE SCREENING ORDER (NO ISOLATION) - Swab, Nasopharynx [[id number]]  (Normal) Collected: 12/01/21 2328    Specimen: Swab from Nasopharynx Updated: 12/02/21 0036    Narrative:      The following orders were created for panel order COVID PRE-OP / PRE-PROCEDURE SCREENING ORDER (NO ISOLATION) - Swab, Nasopharynx.  Procedure                               Abnormality         Status                     ---------                               -----------         ------                     COVID-19,[facility] NAY IN-HOUSE...[[id number]]  Normal              Final result                 Please view results for these tests on the individual orders.    COVID-19,[facility] NAY IN-HOUSE CEPHEID/HATTIE NP SWAB IN TRANSPORT MEDIA 8-12 HR TAT - Swab, Nasopharynx [[id number]]  (Normal) Collected: 12/01/21 2328    Specimen: Swab from Nasopharynx Updated: 12/02/21 0036     COVID19 Not Detected    Narrative:      Fact sheet for providers: https://www.fda.gov/media/275015/download    Fact sheet for patients: https://www.fda.gov/media/164016/download    Test performed by PCR.    POC Glucose Once [[id number]]  (Abnormal) Collected: 12/02/21 0017    Specimen: Blood Updated: 12/02/21 0019     Glucose 515 mg/dL      Comment: Repeat Test Meter: [id number] : [id number] Napoleon Beckwith PCA       POC Glucose Once [[id number]]  (Abnormal) Collected: 12/02/21 0019    Specimen: Blood Updated: 12/02/21 0020     Glucose 517 mg/dL      Comment: Repeat Test Meter: [id number] : [id number] Napoleon Beckwith PCA       POC Glucose Once [[id number]]  (Abnormal) Collected: 12/02/21 0139    Specimen: Blood Updated: 12/02/21 0140     Glucose 360 mg/dL      Comment: Meter: [id number] : [id number] Annika Zapien RN        Blood Gas, Arterial - [[id number]]  (Abnormal) Collected: 12/02/21 0215    Specimen: Arterial Blood Updated: 12/02/21 0217     Site Arterial: left radial     Bebeto's Test Positive     pH, Arterial 7.443 pH units      pCO2, Arterial 33.5 mm Hg      pO2, Arterial 56.9 mm Hg      HCO3, Arterial 22.9 mmol/L      Base Excess, Arterial -0.6 mmol/L      O2 Saturation Calculated 90.6 %      Barometric Pressure for Blood Gas 747.7 mmHg      Comment: Meter: [id number] : [id number] Jeramy Griselda        Modality Room Air     Rate 16 Breaths/minute     POC Glucose Once [[id number]]  (Abnormal) Collected: 12/02/21 0435    Specimen: Blood Updated: 12/02/21 0435     Glucose 300 mg/dL      Comment: Meter: [id number] : [id number] Maria Guadalupejj Paniagua              Imaging Results (Last 24 Hours)     Procedure Component Value Units Date/Time    CT Head Without Contrast [[id number]] Collected: 12/01/21 2349     Updated: 12/01/21 2357    Narrative:      CT HEAD WITHOUT CONTRAST     HISTORY: Hyperglycemia     COMPARISON: 12/15/2017     TECHNIQUE: Axial CT imaging was obtained through the brain. No IV  contrast was administered.     FINDINGS:  No acute intracranial hemorrhage is seen. There is diffuse atrophy.  There is periventricular and deep white matter microangiopathic change.  There is no midline shift or mass effect. 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 No calvarial fracture is  seen. There is some mucosal thickening seen within the ethmoid sinuses.  Mastoid air cells appear clear.       Impression:      No acute intracranial findings.     Radiation dose reduction techniques were utilized, including automated  exposure control and exposure modulation based on body size.     This report was finalized on 12/1/2021 11:53 PM by Dr. Kristina Forman M.D.       XR Chest 1 View [[id number]] Collected: 12/01/21 2256     Updated: 12/01/21 2300    Narrative:      SINGLE VIEW OF THE CHEST     HISTORY:  Altered mental status     COMPARISON: Similar 15 2017     FINDINGS:  Heart size is within normal limits for technique. No pneumothorax or  pleural effusion is seen. There is some calcification of the aorta.  Patient does appear to have some mild diffuse interstitial prominence,  which could reflect some vascular congestion. There is some bibasilar  atelectasis.       Impression:      Mild diffuse interstitial prominence may reflect some vascular  congestion.     This report was finalized on 12/1/2021 10:57 PM by Dr. Kristina Forman M.D.                 ECG 12 Lead   Preliminary Result   HEART RATE= 94  bpm   RR Interval= 640  ms   IN Interval= 159  ms   P Horizontal Axis= -61  deg   P Front Axis= 29  deg   QRSD Interval= 140  ms   QT Interval= 402  ms   QRS Axis= 77  deg   T Wave Axis= -52  deg   - ABNORMAL ECG -   Sinus rhythm   Probable left atrial enlargement   Right bundle branch block   ST depr, consider ischemia, inferior leads   Electronically Signed By:    Date and Time of Study: 2021-12-01 22:27:52           Assessment/Plan     Active Hospital Problems    Diagnosis  POA   • **Hyperglycemia [R73.9]  Unknown   • Metabolic encephalopathy [G93.41]  Yes   • VIPUL (acute kidney injury) (HCC) [N17.9]  Unknown   • Type 2 diabetes mellitus, with long-term current use of insulin (HCC) [E11.9, Z79.4]  Not Applicable   • Hyperlipidemia [E78.5]  Yes   • Primary hypothyroidism [E03.9]  Yes   • Benign essential hypertension [I10]  Yes       Hyperglycemia/Type 2 Diabetes Mellitus  -Her last blood sugar was down to 360  -Admit to a telemetry unit for monitoring  -Does not appear to be in DKA  -Initiate correctional factor insulin  -Continue basal insulin at home dose of 60 units nightly  -Monitor blood sugars ACHS  -Check hgb A1C  -CT head negative for an acute intracranial process. Metabolic encephalopathy secondary to hyperglycemia    VIPUL  - Secondary to hyperglycemia  -IVF  -Avoid nephrotoxins  -Repeat lab work in  AM    Hypertension  -Blood pressures have been elevated. Continue home regimen  -Monitor    Hypothyroidism  -Continue Levothyroxine  -Check TSH    Hyperlipidemia  -Continue statin    -I discussed the patients findings and my recommendations with patient.    VTE Prophylaxis - SCDs.  Code Status - Full code.       SERGE Moyer  [facility]  12/02/21  04:36 EST

## 2021-12-02 NOTE — CASE MANAGEMENT/SOCIAL WORK
Continued Stay Note  [facility]     Patient Name: Halie Guidry  MRN: [id number]  Today's Date: 12/2/2021    Admit Date: 12/1/2021     Discharge Plan     Row Name 12/02/21 0852       Plan    Plan Plan home with family.   GENNY Malik RN    Patient/Family in Agreement with Plan yes    Plan Comments FACE SHEET VERIFIED/ IM LETTER SIGNED.  Spoke with pt at bedside.  Pt's PCP is Dr. Napoleon Mead.  Pt lives in a single level condo with her son  (Derrick Guidry  [phone]).  Pt is independent with ADLs.  Pt has a glucometer, grab bar, shower chair, and rolling walker for home use.  Pt gets her prescriptions at [facility]) and [facility].  Pt denies any issues affording medications.  Pt is not current with HH.  Pt has been in SNF.  Pt cannot recall name of SNF.  Pt states her son will assist her at home.  Pt denies any discharge needs.  Plan home with family.  GENNY Malik RN               Discharge Codes    No documentation.                     Tala Malik RN

## 2021-12-02 NOTE — ED NOTES
Patient was placed in face mask during first look triage.  Patient was wearing a face mask throughout encounter.  I wore personal protective equipment throughout the encounter.  Hand hygiene was performed before and after patient encounter.       Tang Bender RN  12/01/21 2051

## 2021-12-02 NOTE — CASE MANAGEMENT/SOCIAL WORK
Discharge Planning Assessment  [facility]     Patient Name: Halie Guidry  MRN: [id number]  Today's Date: 12/2/2021    Admit Date: 12/1/2021     Discharge Needs Assessment     Row Name 12/02/21 0850       Living Environment    Lives With child(beatriz), adult    Name(s) of Who Lives With Patient Son  (Derrick Guidry  [phone])    Current Living Arrangements home/apartment/condo    Primary Care Provided by self    Provides Primary Care For no one    Family Caregiver if Needed child(beatriz), adult    Family Caregiver Names Son  (Derrick Guidry  [phone])    Quality of Family Relationships helpful; involved; supportive    Able to Return to Prior Arrangements yes    Living Arrangement Comments Pt lives in a single level condo with her son  (Derrick Guidry  [phone]).       Resource/Environmental Concerns    Resource/Environmental Concerns none    Transportation Concerns car, none       Transition Planning    Patient/Family Anticipates Transition to home with family    Patient/Family Anticipated Services at Transition none    Transportation Anticipated family or friend will provide       Discharge Needs Assessment    Equipment Currently Used at Home glucometer; grab bar; shower chair; walker, rolling    Concerns to be Addressed no discharge needs identified; denies needs/concerns at this time    Anticipated Changes Related to Illness none    Equipment Needed After Discharge grab bar, tub/shower; glucometer; shower chair; walker, rolling               Discharge Plan     Row Name 12/02/21 0852       Plan    Plan Plan home with family.   GENNY Malik RN    Patient/Family in Agreement with Plan yes    Plan Comments FACE SHEET VERIFIED/ IM LETTER SIGNED.  Spoke with pt at bedside.  Pt's PCP is Dr. Napoleon Mead.  Pt lives in a single level condo with her son  (Derrick Guidry  [phone]).  Pt is independent with ADLs.  Pt has a glucometer, grab bar, shower chair, and rolling walker for home use.  [facility].  Pt denies any issues affording medications.  Pt is not current with HH.  Pt has been in SNF.  Pt cannot recall name of SNF.  Pt states her son will assist her at home.  Pt denies any discharge needs.  Plan home with family.  GENNY Malik RN              Continued Care and Services - Admitted Since 12/1/2021    Coordination has not been started for this encounter.          Demographic Summary     Row Name 12/02/21 0885       General Information    Admission Type inpatient    Arrived From emergency department    Required Notices Provided Important Message from Medicare    Referral Source admission list    Reason for Consult discharge planning    Preferred Language English     Used During This Interaction no               Functional Status     Row Name 12/02/21 2980       Functional Status    Usual Activity Tolerance good    Current Activity Tolerance moderate       Functional Status, IADL    Medications independent    Meal Preparation assistive person    Housekeeping assistive person    Laundry assistive person    Shopping assistive person       Mental Status    General Appearance WDL WDL               Psychosocial    No documentation.                Abuse/Neglect    No documentation.                Legal    No documentation.                Substance Abuse    No documentation.                Patient Forms    No documentation.                   Tala Malik, RN

## 2021-12-02 NOTE — ED TRIAGE NOTES
Pt arrived to ER via pv with son. Son states that pt is diabetic and does take insulin and it is reading over 500. Son believes that pt hasn't been taking insulin the past day or two.   Son also states that pt has increased shaking, confusion, and weakness.    Blood glucose was read during triage 2x and the reading was high.  Pt placed in mask & this RN in appropriate PPE during care

## 2021-12-03 LAB
ANION GAP SERPL CALCULATED.3IONS-SCNC: 12.8 MMOL/L (ref 5–15)
BUN SERPL-MCNC: 19 MG/DL (ref 8–23)
BUN/CREAT SERPL: 17.8 (ref 7–25)
CALCIUM SPEC-SCNC: 9 MG/DL (ref 8.6–10.5)
CHLORIDE SERPL-SCNC: 104 MMOL/L (ref 98–107)
CO2 SERPL-SCNC: 22.2 MMOL/L (ref 22–29)
CREAT SERPL-MCNC: 1.07 MG/DL (ref 0.57–1)
DEPRECATED RDW RBC AUTO: 44 FL (ref 37–54)
ERYTHROCYTE [DISTWIDTH] IN BLOOD BY AUTOMATED COUNT: 13.5 % (ref 12.3–15.4)
GFR SERPL CREATININE-BSD FRML MDRD: 49 ML/MIN/1.73
GLUCOSE BLDC GLUCOMTR-MCNC: 123 MG/DL (ref 70–130)
GLUCOSE BLDC GLUCOMTR-MCNC: 152 MG/DL (ref 70–130)
GLUCOSE BLDC GLUCOMTR-MCNC: 185 MG/DL (ref 70–130)
GLUCOSE BLDC GLUCOMTR-MCNC: 204 MG/DL (ref 70–130)
GLUCOSE SERPL-MCNC: 126 MG/DL (ref 65–99)
HCT VFR BLD AUTO: 39.7 % (ref 34–46.6)
HGB BLD-MCNC: 13.1 G/DL (ref 12–15.9)
MCH RBC QN AUTO: 29 PG (ref 26.6–33)
MCHC RBC AUTO-ENTMCNC: 33 G/DL (ref 31.5–35.7)
MCV RBC AUTO: 88 FL (ref 79–97)
PLATELET # BLD AUTO: 307 10*3/MM3 (ref 140–450)
PMV BLD AUTO: 10.5 FL (ref 6–12)
POTASSIUM SERPL-SCNC: 3.5 MMOL/L (ref 3.5–5.2)
RBC # BLD AUTO: 4.51 10*6/MM3 (ref 3.77–5.28)
SODIUM SERPL-SCNC: 139 MMOL/L (ref 136–145)
WBC NRBC COR # BLD: 9.83 10*3/MM3 (ref 3.4–10.8)

## 2021-12-03 PROCEDURE — 63710000001 INSULIN GLARGINE PER 5 UNITS: Performed by: NURSE PRACTITIONER

## 2021-12-03 PROCEDURE — 97530 THERAPEUTIC ACTIVITIES: CPT

## 2021-12-03 PROCEDURE — 96361 HYDRATE IV INFUSION ADD-ON: CPT

## 2021-12-03 PROCEDURE — 97162 PT EVAL MOD COMPLEX 30 MIN: CPT

## 2021-12-03 PROCEDURE — 63710000001 INSULIN REGULAR HUMAN PER 5 UNITS: Performed by: NURSE PRACTITIONER

## 2021-12-03 PROCEDURE — 94761 N-INVAS EAR/PLS OXIMETRY MLT: CPT

## 2021-12-03 PROCEDURE — 82962 GLUCOSE BLOOD TEST: CPT

## 2021-12-03 PROCEDURE — 85027 COMPLETE CBC AUTOMATED: CPT | Performed by: HOSPITALIST

## 2021-12-03 PROCEDURE — G0378 HOSPITAL OBSERVATION PER HR: HCPCS

## 2021-12-03 PROCEDURE — 63710000001 INSULIN LISPRO (HUMAN) PER 5 UNITS: Performed by: HOSPITALIST

## 2021-12-03 PROCEDURE — 94799 UNLISTED PULMONARY SVC/PX: CPT

## 2021-12-03 PROCEDURE — 80048 BASIC METABOLIC PNL TOTAL CA: CPT | Performed by: HOSPITALIST

## 2021-12-03 RX ORDER — INSULIN LISPRO 100 [IU]/ML
10 INJECTION, SOLUTION INTRAVENOUS; SUBCUTANEOUS
Qty: 15 PEN | Refills: 3 | Status: ON HOLD
Start: 2021-12-03 | End: 2022-06-24 | Stop reason: SDUPTHER

## 2021-12-03 RX ADMIN — PREGABALIN 50 MG: 50 CAPSULE ORAL at 20:24

## 2021-12-03 RX ADMIN — INSULIN HUMAN 5 UNITS: 100 INJECTION, SOLUTION PARENTERAL at 17:04

## 2021-12-03 RX ADMIN — PANTOPRAZOLE SODIUM 40 MG: 40 TABLET, DELAYED RELEASE ORAL at 06:36

## 2021-12-03 RX ADMIN — ATORVASTATIN CALCIUM 80 MG: 80 TABLET, FILM COATED ORAL at 20:24

## 2021-12-03 RX ADMIN — PANTOPRAZOLE SODIUM 40 MG: 40 TABLET, DELAYED RELEASE ORAL at 17:04

## 2021-12-03 RX ADMIN — PREGABALIN 50 MG: 50 CAPSULE ORAL at 08:03

## 2021-12-03 RX ADMIN — INSULIN LISPRO 10 UNITS: 100 INJECTION, SOLUTION INTRAVENOUS; SUBCUTANEOUS at 17:04

## 2021-12-03 RX ADMIN — INSULIN HUMAN 3 UNITS: 100 INJECTION, SOLUTION PARENTERAL at 06:53

## 2021-12-03 RX ADMIN — LEVOTHYROXINE SODIUM 112 MCG: 0.11 TABLET ORAL at 06:35

## 2021-12-03 RX ADMIN — INSULIN LISPRO 10 UNITS: 100 INJECTION, SOLUTION INTRAVENOUS; SUBCUTANEOUS at 08:03

## 2021-12-03 RX ADMIN — HYDROCODONE BITARTRATE AND ACETAMINOPHEN 1 TABLET: 10; 325 TABLET ORAL at 20:24

## 2021-12-03 RX ADMIN — INSULIN HUMAN 5 UNITS: 100 INJECTION, SOLUTION PARENTERAL at 00:22

## 2021-12-03 RX ADMIN — INSULIN GLARGINE 60 UNITS: 100 INJECTION, SOLUTION SUBCUTANEOUS at 21:52

## 2021-12-03 RX ADMIN — HYDROCODONE BITARTRATE AND ACETAMINOPHEN 1 TABLET: 10; 325 TABLET ORAL at 06:41

## 2021-12-03 RX ADMIN — BISOPROLOL FUMARATE 5 MG: 5 TABLET, FILM COATED ORAL at 08:03

## 2021-12-03 RX ADMIN — SODIUM CHLORIDE 100 ML/HR: 9 INJECTION, SOLUTION INTRAVENOUS at 04:39

## 2021-12-03 RX ADMIN — INSULIN LISPRO 10 UNITS: 100 INJECTION, SOLUTION INTRAVENOUS; SUBCUTANEOUS at 12:41

## 2021-12-03 RX ADMIN — SODIUM CHLORIDE, PRESERVATIVE FREE 10 ML: 5 INJECTION INTRAVENOUS at 22:14

## 2021-12-03 NOTE — PLAN OF CARE
Goal Outcome Evaluation:         Pt. Alert, oriented x3, forgetful,pt. Used bedpan in bed with 1 person assisted, v/s stable, no voiced c/o pain, no s/s acute distress noted

## 2021-12-03 NOTE — DISCHARGE SUMMARY
[facility]    Date of Discharge:  12/3/2021    PCP: Napoleon Mead MD    Discharge Diagnosis:   Active Hospital Problems    Diagnosis  POA   • **Type II diabetes mellitus, uncontrolled (HCC) [E11.65]  Unknown   • Metabolic encephalopathy [G93.41]  Yes   • VIPUL (acute kidney injury) (HCC) [N17.9]  Unknown   • Type 2 diabetes mellitus, with long-term current use of insulin (HCC) [E11.9, Z79.4]  Not Applicable   • Hyperlipidemia [E78.5]  Yes   • Primary hypothyroidism [E03.9]  Yes   • Benign essential hypertension [I10]  Yes      Resolved Hospital Problems   No resolved problems to display.          Consults     Date and Time Order Name Status Description    12/1/2021 11:25 PM [facility] (on-call MD unless specified) Details          Hospital Course  81 y.o. female with a history of diabetes noncompliant with her diet recently and also son states that she may be missing insulin. She has been eating gumdrops, half a bag of Twizzlers other day, pancake or cereal, pasta, white bread. Her A1c was 10. About 5 months ago 7.2 and a year ago who was 5.9. She was resumed on similar doses to home insulin and this morning's blood sugar is 152. On admission glucose was 617.    She also had some mild acute kidney injury due to dehydration. She also had some mild confusion and some dyspnea on exertion with mild lower extremity swelling. Chest x-ray showed some mild diffuse interstitial prominence possibly reflecting some vascular congestion however her lungs were clear and she currently has no dyspnea. CT of her head was negative and confusion resolved. Likely due to uncontrolled diabetes. She was given fluids. Echocardiogram and Doppler were unremarkable. She was seen by nutrition as well as a diabetic nurse educator. She plans to follow-up with Dr. Barrow who she normally sees.    Results for orders placed during the hospital encounter of 12/01/21    Adult Transthoracic Echo  Complete W/ Cont if Necessary Per Protocol    Interpretation Summary  · Left ventricular ejection fraction appears to be 66 - 70%. Left ventricular systolic function is normal.  · Left ventricular wall thickness is consistent with mild to moderate concentric hypertrophy.  · Left ventricular diastolic function is consistent with (grade I) impaired relaxation.  · Normal right ventricular cavity size and systolic function noted.  · The left atrial cavity is mild to moderately dilated.  · There is a trivial pericardial effusion.   Results for orders placed during the hospital encounter of 12/01/21    Duplex Venous Lower Extremity - Bilateral CAR    Interpretation Summary  · Normal bilateral lower extremity venous duplex scan.     I discussed the patient's findings and my recommendations with patient and nursing staff.    Condition on Discharge: Improved.     Temp:  [98.2 °F (36.8 °C)-98.5 °F (36.9 °C)] 98.2 °F (36.8 °C)  Heart Rate:  [53-66] 53  Resp:  [18] 18  BP: (131-201)/(54-78) 142/68  Body mass index is 29.6 kg/m².    Physical Exam  Constitutional:       General: She is not in acute distress.     Appearance: Normal appearance. She is not toxic-appearing.   HENT:      Head: Normocephalic and atraumatic.   Cardiovascular:      Rate and Rhythm: Normal rate and regular rhythm.   Pulmonary:      Effort: Pulmonary effort is normal. No respiratory distress.      Breath sounds: Normal breath sounds. No wheezing or rhonchi.   Abdominal:      General: Bowel sounds are normal. There is no distension.      Palpations: Abdomen is soft.      Tenderness: There is no abdominal tenderness. There is no guarding or rebound.   Musculoskeletal:         General: No swelling.      Right lower leg: No edema.      Left lower leg: No edema.   Skin:     General: Skin is warm and dry.   Neurological:      General: No focal deficit present.      Mental Status: She is alert and oriented to person, place, and time.   Psychiatric:         Mood  and Affect: Mood normal.         Behavior: Behavior normal.     While in the room and during my examination of the patient I wore gloves, mask, eye protection.  I washed my hands before and after this patient encounter.     Disposition: Home or Self Care       Discharge Medications      Changes to Medications      Instructions Start Date   Insulin Lispro (1 Unit Dial) 100 UNIT/ML solution pen-injector  Commonly known as: HUMALOG  What changed:   · how much to take  · how to take this  · when to take this  · additional instructions   10 Units, Subcutaneous, 3 Times Daily With Meals      Toujeo SoloStar 300 UNIT/ML solution pen-injector injection  Generic drug: Insulin Glargine (1 Unit Dial)  What changed: Another medication with the same name was removed. Continue taking this medication, and follow the directions you see here.   INJECT 60 UNITS UNDER THE SKIN INTO THE APPROPRIATE AREA AS DIRECTED EVERY NIGHT AT BEDTIME         Continue These Medications      Instructions Start Date   atorvastatin 80 MG tablet  Commonly known as: LIPITOR   TAKE 1 TABLET EVERY NIGHT      bisoprolol 5 MG tablet  Commonly known as: ZEBeta   5 mg, Oral, Daily      hydroCHLOROthiazide 12.5 MG tablet  Commonly known as: HYDRODIURIL   12.5 mg, Oral, Daily      HYDROcodone-acetaminophen  MG per tablet  Commonly known as: NORCO   1 tablet, Oral, Every 8 Hours PRN      Insulin Pen Needle 32G X 4 MM misc   Use to inject insulin 4 TIMES DAILY      lansoprazole 30 MG capsule  Commonly known as: PREVACID   30 mg, Oral, Daily      levothyroxine 112 MCG tablet  Commonly known as: SYNTHROID, LEVOTHROID   112 mcg, Oral, Daily      lisinopril 5 MG tablet  Commonly known as: PRINIVIL,ZESTRIL   5 mg, Oral, Daily      Ozempic (0.25 or 0.5 MG/DOSE) 2 MG/1.5ML solution pen-injector  Generic drug: Semaglutide(0.25 or 0.5MG/DOS)   0.5 mg, Subcutaneous, Weekly, Stop victoza      pregabalin 50 MG capsule  Commonly known as: LYRICA   TAKE 1 CAPSULE BY MOUTH  THREE TIMES DAILY            Diet Instructions     Diet: Regular, Consistent Carbohydrate      Discharge Diet:  Regular  Consistent Carbohydrate            Activity Instructions     Activity as Tolerated           Additional Instructions for the Follow-ups that You Need to Schedule     Call MD for problems / concerns.   As directed         Follow-up Information     Napoleon Mead MD .    Specialty: Family Medicine  Contact information:  [address] [address] [phone]             Solomon Barrow MD Follow up.    Specialty: Endocrinology  Contact information:  [address] [address] [phone]                           Efe Moses MD  12/03/21  10:24 EST    Discharge time spent greater than 30 minutes.

## 2021-12-03 NOTE — CASE MANAGEMENT/SOCIAL WORK
Continued Stay Note  [facility]     Patient Name: Halie Guidry  MRN: [id number]  Today's Date: 12/3/2021    Admit Date: 12/1/2021     Discharge Plan     Row Name 12/03/21 1435       Plan    Plan Plan home with family or skilled care- pre cert needed.   GENNY Malik RN    Patient/Family in Agreement with Plan yes    Plan Comments Spoke with pt at bedside.  Per PT recommendation is SNF.   Pt's choice for skilled care is [facility] or [facility] for skilled care.  Cinda  ( [phone]) called to follow.  Plan home with family or skilled car- pre cert needed.   GENNY Malik RN               Discharge Codes    No documentation.               Expected Discharge Date and Time     Expected Discharge Date Expected Discharge Time    Dec 3, 2021             Tala Malik, RN

## 2021-12-03 NOTE — THERAPY EVALUATION
Patient Name: Halie Guidry  : 1940    MRN: [id number]                              Today's Date: 12/3/2021       Admit Date: 2021    Visit Dx:     ICD-10-CM ICD-9-CM   1. Metabolic encephalopathy  G93.41 348.31   2. Hyperglycemia  R73.9 790.29   3. VIPUL (acute kidney injury) ([facility])  N17.9 584.9   4. Type 2 diabetes mellitus with other circulatory complication, with long-term current use of insulin ([facility])  E11.59 250.70    Z79.4 V58.67     Patient Active Problem List   Diagnosis   • Compression fracture   • Lumbar degenerative disc disease   • Type 2 diabetes mellitus, with long-term current use of insulin ([facility])   • Hyperlipidemia   • Benign essential hypertension   • Primary hypothyroidism   • Leukocytosis   • Neuropathy   • Osteoporosis   • Proteinuria   • Tobacco abuse   • Diabetic eye exam ([facility])   • Generalized weakness   • Spinal stenosis   • Scoliosis   • Arthritis   • VBI (vertebrobasilar insufficiency)   • Orthostatic hypotension   • 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 ([facility])   • Severe hypothyroidism   • Noncompliance   • Vitamin D deficiency disease   • Altered mental status   • Tremor   • Seizure ([facility])   • Chronic kidney disease, stage 2 (mild)   • Thoracic degenerative disc disease   • Metabolic encephalopathy   • VIPUL (acute kidney injury) ([facility])   • Hyperglycemia   • Type II diabetes mellitus, uncontrolled ([facility])     Past Medical History:   Diagnosis Date   • Anxiety    • Arthritis    • Benign essential hypertension 2014   • 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[facility])    • Depression    • Diabetes ([facility])    • Diabetes mellitus, type 2 ([facility])    • Disc degeneration, lumbar    • Headache, tension-type    • Hyperlipidemia    • Hypothyroidism    • Neuropathy    • Osteoporosis 2015   • Peripheral neuropathy    • Rotator cuff tear, left    • Scoliosis    • Shoulder pain     LEFT, TORN ROTATOR CUFF S/P FALL   • Spinal stenosis      Past Surgical History:   Procedure Laterality Date   •  APPENDECTOMY     • BILATERAL BREAST REDUCTION Bilateral 08/2015   • CATARACT EXTRACTION  03/2015   • COLONOSCOPY  06/05/2015    WNL   • KYPHOPLASTY     • REDUCTION MAMMAPLASTY     • TONSILLECTOMY        General Information     Row Name 12/03/21 1506          Physical Therapy Time and Intention    Document Type evaluation  -     Mode of Treatment individual therapy; physical therapy  -     Row Name 12/03/21 1506          General Information    Patient Profile Reviewed yes  -CF     Prior Level of Function min assist:  unclear PLOF, lives with son and she states he is home 24/7, unclear how much assist is needed  -     Existing Precautions/Restrictions fall  -     Barriers to Rehab previous functional deficit; medically complex; cognitive status  -     Row Name 12/03/21 1506          Living Environment    Lives With child(beatriz), adult  -     Row Name 12/03/21 1506          Cognition    Orientation Status (Cognition) oriented to; person; place  -     Row Name 12/03/21 1506          Safety Issues, Functional Mobility    Safety Issues Affecting Function (Mobility) awareness of need for assistance; insight into deficits/self-awareness; safety precautions follow-through/compliance  -     Impairments Affecting Function (Mobility) endurance/activity tolerance; strength; balance  -           User Key  (r) = Recorded By, (t) = Taken By, (c) = Cosigned By    Initials Name Provider Type    CF Hetal Velasco, MELODIE Physical Therapist               Mobility     Row Name 12/03/21 1510          Bed Mobility    Bed Mobility supine-sit; sit-supine  -     Supine-Sit [place] (Bed Mobility) verbal cues; minimum assist (75% patient effort)  -     Sit-Supine [place] (Bed Mobility) minimum assist (75% patient effort); verbal cues  -     Assistive Device (Bed Mobility) head of bed elevated  -     Row Name 12/03/21 1510          Transfers    Comment (Transfers) required multiple attempts to complete sit to  stand from EOB  -CF     Row Name 12/03/21 1510          Sit-Stand Transfer    Sit-Stand [place] (Transfers) moderate assist (50% patient effort); 2 person assist; verbal cues  -CF     Assistive Device (Sit-Stand Transfers) walker, front-wheeled  -CF     Row Name 12/03/21 1510          Gait/Stairs (Locomotion)    [place] Level (Gait) minimum assist (75% patient effort); 2 person assist; verbal cues  -CF     Assistive Device (Gait) walker, front-wheeled  -CF     Distance in Feet (Gait) 15'  -CF     Deviations/Abnormal Patterns (Gait) tess decreased; gait speed decreased; stride length decreased; base of support, narrow  -CF     Bilateral Gait Deviations forward flexed posture  -CF     Comment (Gait/Stairs) Very slow pace, very flexed posture. Generally unsteady on her feet  -CF           User Key  (r) = Recorded By, (t) = Taken By, (c) = Cosigned By    Initials Name Provider Type     Hetal Velasco PT Physical Therapist               Obj/Interventions     Row Name 12/03/21 1519          Range of Motion Comprehensive    General Range of Motion bilateral lower extremity ROM WFL  -CF     Row Name 12/03/21 1519          Strength Comprehensive (MMT)    Comment, General Manual Muscle Testing (MMT) Assessment BLE grossly 3/5  -CF     Row Name 12/03/21 1519          Balance    Balance Assessment sitting static balance; sitting dynamic balance; standing static balance; standing dynamic balance  -CF     Static Sitting Balance WFL; sitting, edge of bed  -CF     Dynamic Sitting Balance mild impairment; sitting, edge of bed  -CF     Static Standing Balance mild impairment; supported  -CF     Dynamic Standing Balance moderate impairment; supported  -CF           User Key  (r) = Recorded By, (t) = Taken By, (c) = Cosigned By    Initials Name Provider Type    Hetal Davies PT Physical Therapist               Goals/Plan     Row Name 12/03/21 1527          Bed Mobility Goal 1 (PT)    Activity/Assistive Device  (Bed Mobility Goal 1, PT) bed mobility activities, all  -CF     [place] Level/Cues Needed (Bed Mobility Goal 1, PT) standby assist  -CF     Time Frame (Bed Mobility Goal 1, PT) 2 weeks  -CF     Row Name 12/03/21 1527          Transfer Goal 1 (PT)    Activity/Assistive Device (Transfer Goal 1, PT) sit-to-stand/stand-to-sit; bed-to-chair/chair-to-bed; walker, rolling  -CF     [place] Level/Cues Needed (Transfer Goal 1, PT) minimum assist (75% or more patient effort); 1 person assist  -CF     Time Frame (Transfer Goal 1, PT) 2 weeks  -CF     Row Name 12/03/21 1527          Gait Training Goal 1 (PT)    Activity/Assistive Device (Gait Training Goal 1, PT) gait (walking locomotion); walker, rolling  -CF     [place] Level (Gait Training Goal 1, PT) contact guard assist  -CF     Distance (Gait Training Goal 1, PT) 80'  -CF     Time Frame (Gait Training Goal 1, PT) 2 weeks  -CF           User Key  (r) = Recorded By, (t) = Taken By, (c) = Cosigned By    Initials Name Provider Type    CF Hetal Velasco, PT Physical Therapist               Clinical Impression     Row Name 12/03/21 5007          Pain    Additional Documentation Pain Scale: Numbers Pre/Post-Treatment (Group)  -     Row Name 12/03/21 3321          Pain Scale: Numbers Pre/Post-Treatment    Pretreatment Pain Rating 0/10 - no pain  -     Row Name 12/03/21 1514          Plan of Care Review    Plan of Care Reviewed With patient  -CF     Outcome Summary Pt is an 80 yo female seen for PT evaluation this PM. Pt admitted with blood glucose well over 500, shaking, confusion and general weakness. Pt appears generally confused and is an unreliable historian. Pt reports living in a single level home with her son. Pt reports her son currently does not work but unable to provide information on her PLOF andhow much assist she requires. Upon exam, pt demonstrates generalized weakness, balance impairments and decreased activity tolerance. Pt required mod A x2  to stand after multiple attempts. Pt ambulated 20' with min A x2 - poor posture and generally unsteady appearing. Pt has a few steps to enter her house and PT does not believe pt is safe to return home at her current mobility level. PT is recommending d/c to SNF.  -CF     Row Name 12/03/21 1519          Therapy Assessment/Plan (PT)    Rehab Potential (PT) good, to achieve stated therapy goals  -CF     Criteria for Skilled Interventions Met (PT) yes  -CF     Predicted Duration of Therapy Intervention (PT) 2 weeks  -CF     Row Name 12/03/21 1519          Vital Signs    O2 Delivery Pre Treatment room air  -CF     Row Name 12/03/21 1519          Positioning and Restraints    Pre-Treatment Position in bed  -CF     Post Treatment Position bed  -CF     In Bed notified nsg; call light within reach; encouraged to call for assist; exit alarm on  -CF           User Key  (r) = Recorded By, (t) = Taken By, (c) = Cosigned By    Initials Name Provider Type    Hetal Davies PT Physical Therapist               Outcome Measures     Row Name 12/03/21 1529          How much help from another person do you currently need...    Turning from your back to your side while in flat bed without using bedrails? 3  -CF     Moving from lying on back to sitting on the side of a flat bed without bedrails? 3  -CF     Moving to and from a bed to a chair (including a wheelchair)? 2  -CF     Standing up from a chair using your arms (e.g., wheelchair, bedside chair)? 2  -CF     Climbing 3-5 steps with a railing? 1  -CF     To walk in hospital room? 2  -CF     AM-PAC 6 Clicks Score (PT) 13  -CF     Row Name 12/03/21 1529          Functional Assessment    Outcome Measure Options AM-PAC 6 Clicks Basic Mobility (PT)  -CF           User Key  (r) = Recorded By, (t) = Taken By, (c) = Cosigned By    Initials Name Provider Type    Hetal Davies PT Physical Therapist                             Physical Therapy Education                 Title: PT  OT SLP Therapies (Done)     Topic: Physical Therapy (Done)     Point: Mobility training (Done)     Learning Progress Summary           Patient Acceptance, E, VU,NR by  at 12/3/2021 1529                   Point: Home exercise program (Done)     Learning Progress Summary           Patient Acceptance, E, VU,NR by CF at 12/3/2021 1529                   Point: Body mechanics (Done)     Learning Progress Summary           Patient Acceptance, E, VU,NR by CF at 12/3/2021 1529                   Point: Precautions (Done)     Learning Progress Summary           Patient Acceptance, E, VU,NR by CF at 12/3/2021 1529                               User Key     Initials Effective Dates Name Provider Type Discipline     06/16/21 -  Hetal Velasco, MELODIE Physical Therapist PT              PT Recommendation and Plan  Planned Therapy Interventions (PT): balance training, bed mobility training, gait training, strengthening, patient/family education, transfer training  Plan of Care Reviewed With: patient  Outcome Summary: Pt is an 82 yo female seen for PT evaluation this PM. Pt admitted with blood glucose well over 500, shaking, confusion and general weakness. Pt appears generally confused and is an unreliable historian. Pt reports living in a single level home with her son. Pt reports her son currently does not work but unable to provide information on her PLOF andhow much assist she requires. Upon exam, pt demonstrates generalized weakness, balance impairments and decreased activity tolerance. Pt required mod A x2 to stand after multiple attempts. Pt ambulated 20' with min A x2 - poor posture and generally unsteady appearing. Pt has a few steps to enter her house and PT does not believe pt is safe to return home at her current mobility level. PT is recommending d/c to SNF.     Time Calculation:    PT Charges     Row Name 12/03/21 1501             Time Calculation    Start Time 1359  -CF      Stop Time 1416  -      Time Calculation  (min) 17 min  -CF      PT Received On 12/03/21  -CF      PT - Next Appointment 12/06/21  -CF      PT Goal Re-Cert Due Date 12/17/21  -CF              Time Calculation- PT    Total Timed Code Minutes- PT 10 minute(s)  -CF              Timed Charges    97322 - PT Therapeutic Activity Minutes 10  -CF              Total Minutes    Timed Charges Total Minutes 10  -CF       Total Minutes 10  -CF            User Key  (r) = Recorded By, (t) = Taken By, (c) = Cosigned By    Initials Name Provider Type    CF Hetal Velasco, PT Physical Therapist              Therapy Charges for Today     Code Description Service Date Service Provider Modifiers Qty    62789876462 HC PT EVAL MOD COMPLEXITY 2 12/3/2021 Hetal Velasco, PT GP 1    26938718445 HC PT THERAPEUTIC ACT EA 15 MIN 12/3/2021 Hetal Velasco, PT GP 1    14990527550 HC PT THER SUPP EA 15 MIN 12/3/2021 Hetal Velasco, PT GP 1          PT G-Codes  Outcome Measure Options: AM-PAC 6 Clicks Basic Mobility (PT)  AM-PAC 6 Clicks Score (PT): 13    Hetal Velasco PT  12/3/2021

## 2021-12-03 NOTE — CASE MANAGEMENT/SOCIAL WORK
Continued Stay Note  [facility]     Patient Name: Halie Guidry  MRN: [id number]  Today's Date: 12/3/2021    Admit Date: 12/1/2021     Discharge Plan     Row Name 12/03/21 1435       Plan    Plan Plan home with family or skilled care- pre cert needed.   GENNY Malik RN    Patient/Family in Agreement with Plan yes    Plan Comments Spoke with pt at bedside.  Per PT recommendation is SNF.   Pt's choice for skilled care is [facility] or [facility] for skilled care.  Cinda  ( [phone]) called to follow.  Plan home with family or skilled car- pre cert needed.   GENNY Malik RN               Discharge Codes    No documentation.               Expected Discharge Date and Time     Expected Discharge Date Expected Discharge Time    Dec 3, 2021             Tala Malik, RN

## 2021-12-03 NOTE — PLAN OF CARE
Goal Outcome Evaluation:  Plan of Care Reviewed With: patient           Outcome Summary: Pt is an 80 yo female seen for PT evaluation this PM. Pt admitted with blood glucose well over 500, shaking, confusion and general weakness. Pt appears generally confused and is an unreliable historian. Pt reports living in a single level home with her son. Pt reports her son currently does not work but unable to provide information on her PLOF andhow much assist she requires. Upon exam, pt demonstrates generalized weakness, balance impairments and decreased activity tolerance. Pt required mod A x2 to stand after multiple attempts. Pt ambulated 20' with min A x2 - poor posture and generally unsteady appearing. Pt has a few steps to enter her house and PT does not believe pt is safe to return home at her current mobility level. PT is recommending d/c to SNF.    Patient was not wearing a face mask during this therapy encounter. Therapist used appropriate personal protective equipment including eye protection, mask, and gloves.  Mask used was standard procedure mask. Appropriate PPE was worn during the entire therapy session. Hand hygiene was completed before and after therapy session. Patient is not in enhanced droplet precautions.

## 2021-12-03 NOTE — DISCHARGE PLACEMENT REQUEST
"Tabatha Guidry (81 y.o. Female)             Date of Birth Social Security Number Address Home Phone MRN    1940  [address] [facility] [address] [place] [address] [phone] [id number]    Sikh Marital Status             Unknown        Admission Date Admission Type Admitting Provider Attending Provider Department, Room/Bed    12/1/21 Emergency Ulisses Lobo MD Nguyen, Minh Loc, MD [address], E664/1    Discharge Date Discharge Disposition Discharge Destination           Home or Self Care              Attending Provider: Efe Moses MD    Allergies: Sulfa Antibiotics    Isolation: None   Infection: None   Code Status: CPR   Advance Care Planning Activity    Ht: 170.2 cm (67\")   Wt: 85.7 kg (189 lb)    Admission Cmt: None   Principal Problem: Type II diabetes mellitus, uncontrolled (HCC) [E11.65]                 Active Insurance as of 12/1/2021     Primary Coverage     Payor Plan Insurance Group Employer/Plan Group    [facility] MEDICARE REPLACEMENT [facility] MEDICARE REPLACEMENT [id number]     Payor Plan Address Payor Plan Phone Number Payor Plan Fax Number Effective Dates    [address] [address]   1/1/2016 - None Entered    [facility] [id number]       Subscriber Name Subscriber Birth Date Member ID       TABATHA GUIDRY 1940 [id number]                 Emergency Contacts      (Rel.) Home Phone Work Phone Mobile Phone    Derrick Guidry (Son) -- -- [phone]    No name specified -- -- --    BreaJosse (Son) -- -- [phone]    Melida Guidry [phone] -- --              "

## 2021-12-03 NOTE — PLAN OF CARE
Problem: Fluid Volume Deficit  Goal: Fluid Balance  Outcome: Ongoing, Progressing     Problem: Diabetes Comorbidity  Goal: Blood Glucose Level Within Desired Range  Outcome: Ongoing, Progressing     Problem: Pain Chronic (Persistent) (Comorbidity Management)  Goal: Acceptable Pain Control and Functional Ability  Outcome: Ongoing, Progressing     Problem: Fall Injury Risk  Goal: Absence of Fall and Fall-Related Injury  Outcome: Ongoing, Progressing  Intervention: Promote Injury-Free Environment  Recent Flowsheet Documentation  Taken 12/3/2021 1440 by Beena Wyatt RN  Safety Promotion/Fall Prevention:   fall prevention program maintained   safety round/check completed  Taken 12/3/2021 1200 by Beena Wyatt RN  Safety Promotion/Fall Prevention:   fall prevention program maintained   safety round/check completed  Taken 12/3/2021 1030 by Beena Wyatt RN  Safety Promotion/Fall Prevention:   fall prevention program maintained   safety round/check completed  Taken 12/3/2021 0806 by Beena Wyatt RN  Safety Promotion/Fall Prevention:   fall prevention program maintained   safety round/check completed     Problem: Skin Injury Risk Increased  Goal: Skin Health and Integrity  Outcome: Ongoing, Progressing     Problem: Adult Inpatient Plan of Care  Goal: Plan of Care Review  Outcome: Ongoing, Progressing  Flowsheets (Taken 12/3/2021 1819)  Progress: improving  Outcome Summary: Glucsoe improved.  Medically cleared for DC. Poor mobility w/ PT. Recommending rehab.  Goal: Patient-Specific Goal (Individualized)  Outcome: Ongoing, Progressing  Goal: Absence of Hospital-Acquired Illness or Injury  Outcome: Ongoing, Progressing  Intervention: Identify and Manage Fall Risk  Recent Flowsheet Documentation  Taken 12/3/2021 1440 by Beena Wyatt RN  Safety Promotion/Fall Prevention:   fall prevention program maintained   safety round/check completed  Taken 12/3/2021 1200 by Beena Wyatt RN  Safety  Promotion/Fall Prevention:   fall prevention program maintained   safety round/check completed  Taken 12/3/2021 1030 by Beena Wyatt, RN  Safety Promotion/Fall Prevention:   fall prevention program maintained   safety round/check completed  Taken 12/3/2021 0806 by Beena Wyatt, RN  Safety Promotion/Fall Prevention:   fall prevention program maintained   safety round/check completed  Goal: Optimal Comfort and Wellbeing  Outcome: Ongoing, Progressing  Goal: Readiness for Transition of Care  Outcome: Ongoing, Progressing   Goal Outcome Evaluation:           Progress: improving  Outcome Summary: Glucsoe improved.  Medically cleared for DC. Poor mobility w/ PT. Recommending rehab.

## 2021-12-04 LAB
GLUCOSE BLDC GLUCOMTR-MCNC: 110 MG/DL (ref 70–130)
GLUCOSE BLDC GLUCOMTR-MCNC: 121 MG/DL (ref 70–130)
GLUCOSE BLDC GLUCOMTR-MCNC: 156 MG/DL (ref 70–130)
GLUCOSE BLDC GLUCOMTR-MCNC: 196 MG/DL (ref 70–130)
GLUCOSE BLDC GLUCOMTR-MCNC: 233 MG/DL (ref 70–130)

## 2021-12-04 PROCEDURE — 63710000001 INSULIN GLARGINE PER 5 UNITS: Performed by: NURSE PRACTITIONER

## 2021-12-04 PROCEDURE — G0378 HOSPITAL OBSERVATION PER HR: HCPCS

## 2021-12-04 PROCEDURE — 63710000001 INSULIN REGULAR HUMAN PER 5 UNITS: Performed by: NURSE PRACTITIONER

## 2021-12-04 PROCEDURE — 63710000001 INSULIN LISPRO (HUMAN) PER 5 UNITS: Performed by: HOSPITALIST

## 2021-12-04 PROCEDURE — 82962 GLUCOSE BLOOD TEST: CPT

## 2021-12-04 RX ORDER — LISINOPRIL 5 MG/1
5 TABLET ORAL DAILY
Status: DISCONTINUED | OUTPATIENT
Start: 2021-12-04 | End: 2021-12-07 | Stop reason: HOSPADM

## 2021-12-04 RX ORDER — HYDROCHLOROTHIAZIDE 12.5 MG/1
12.5 TABLET ORAL DAILY
Status: DISCONTINUED | OUTPATIENT
Start: 2021-12-04 | End: 2021-12-07 | Stop reason: HOSPADM

## 2021-12-04 RX ADMIN — HYDROCODONE BITARTRATE AND ACETAMINOPHEN 1 TABLET: 10; 325 TABLET ORAL at 23:20

## 2021-12-04 RX ADMIN — PANTOPRAZOLE SODIUM 40 MG: 40 TABLET, DELAYED RELEASE ORAL at 18:01

## 2021-12-04 RX ADMIN — LEVOTHYROXINE SODIUM 112 MCG: 0.11 TABLET ORAL at 06:33

## 2021-12-04 RX ADMIN — PREGABALIN 50 MG: 50 CAPSULE ORAL at 08:05

## 2021-12-04 RX ADMIN — INSULIN LISPRO 10 UNITS: 100 INJECTION, SOLUTION INTRAVENOUS; SUBCUTANEOUS at 12:24

## 2021-12-04 RX ADMIN — LISINOPRIL 5 MG: 5 TABLET ORAL at 23:21

## 2021-12-04 RX ADMIN — PREGABALIN 50 MG: 50 CAPSULE ORAL at 21:06

## 2021-12-04 RX ADMIN — SODIUM CHLORIDE, PRESERVATIVE FREE 10 ML: 5 INJECTION INTRAVENOUS at 08:05

## 2021-12-04 RX ADMIN — SODIUM CHLORIDE, PRESERVATIVE FREE 10 ML: 5 INJECTION INTRAVENOUS at 21:06

## 2021-12-04 RX ADMIN — ATORVASTATIN CALCIUM 80 MG: 80 TABLET, FILM COATED ORAL at 21:06

## 2021-12-04 RX ADMIN — HYDROCHLOROTHIAZIDE 12.5 MG: 12.5 CAPSULE ORAL at 23:21

## 2021-12-04 RX ADMIN — PANTOPRAZOLE SODIUM 40 MG: 40 TABLET, DELAYED RELEASE ORAL at 06:32

## 2021-12-04 RX ADMIN — INSULIN HUMAN 2 UNITS: 100 INJECTION, SOLUTION PARENTERAL at 18:01

## 2021-12-04 RX ADMIN — BISOPROLOL FUMARATE 5 MG: 5 TABLET, FILM COATED ORAL at 08:05

## 2021-12-04 RX ADMIN — INSULIN HUMAN 5 UNITS: 100 INJECTION, SOLUTION PARENTERAL at 12:24

## 2021-12-04 RX ADMIN — INSULIN GLARGINE 60 UNITS: 100 INJECTION, SOLUTION SUBCUTANEOUS at 21:05

## 2021-12-04 RX ADMIN — INSULIN LISPRO 10 UNITS: 100 INJECTION, SOLUTION INTRAVENOUS; SUBCUTANEOUS at 18:01

## 2021-12-04 NOTE — PLAN OF CARE
"Goal Outcome Evaluation:  Plan of Care Reviewed With: patient, son           Outcome Summary: Pt has been anxious to leave.  PT is not to see her today.  Son came in room and wanted to talk to the doctor.  This RN went in to see what the need was.  The pt is wanting to go home and feel she is trapped in her bed.  It was explained she was not trapped.  Confusion comes and goes but mostly A&Ox3.  Explained to son that she needed Rehab based on PT notes.  This nurse and son assisted pt up to the walker and it did take two people to guide her with the walker and gait belt.  When pt got out of bed, she was saturated with urine and the pad was also saturated.  Pt is cont of B&B but decided she did not want to get cleaned up.  When the son saw that, he was baffled.  Explained to her that she can not sit in her urine all day, She looked puzzled like \"Why not\".  Kristyn told me that she did not want to be cleaned today but I did not know that she was sitting in her urine.  Pt was educated and told she can not refused to get cleaned up. NSR on  monitor.  VSS.  Will cont to monitor.  "

## 2021-12-04 NOTE — PROGRESS NOTES
Name: Halie Guidry ADMIT: 2021   : 1940  PCP: Napoleon Mead MD    MRN: [id number] LOS: 0 days   AGE/SEX: 81 y.o. female  ROOM: [facility]/     Subjective   Subjective   Patient was seen and felt stable for discharge however after she was evaluated by PT yesterday it was felt she was not safe for home. Patient without new complaints wanting to go home but understands need for SNF. Ate breakfast today without any problem    Review of Systems   Constitutional: Negative for fever.   Respiratory: Negative for cough and shortness of breath.    Cardiovascular: Negative for chest pain.   Gastrointestinal: Negative for abdominal pain.      Objective   Objective   Vital Signs  Temp:  [97.3 °F (36.3 °C)-98.5 °F (36.9 °C)] 97.8 °F (36.6 °C)  Heart Rate:  [52-59] 52  Resp:  [12-18] 18  BP: (159-194)/(64-88) 187/88  SpO2:  [94 %-95 %] 94 %  on   ;   Device (Oxygen Therapy): room air  Body mass index is 29.6 kg/m².    Physical Exam  Constitutional:       General: She is not in acute distress.     Appearance: Normal appearance. She is not toxic-appearing.   HENT:      Head: Normocephalic and atraumatic.   Cardiovascular:      Rate and Rhythm: Normal rate and regular rhythm.   Pulmonary:      Effort: Pulmonary effort is normal. No respiratory distress.      Breath sounds: Normal breath sounds. No wheezing or rhonchi.   Abdominal:      General: Bowel sounds are normal. There is no distension.      Palpations: Abdomen is soft.      Tenderness: There is no abdominal tenderness. There is no guarding or rebound.   Musculoskeletal:         General: No swelling.      Right lower leg: No edema.      Left lower leg: No edema.   Skin:     General: Skin is warm and dry.   Neurological:      General: No focal deficit present.      Mental Status: She is alert and oriented to person, place, and time.   Psychiatric:         Mood and Affect: Mood normal.         Behavior: Behavior normal.     Results Review  I reviewed the  patient's new clinical results.  Results from last 7 days   Lab Units 12/03/21  0701 12/02/21  0535 12/01/21  2222   WBC 10*3/mm3 9.83 12.96* 12.85*   HEMOGLOBIN g/dL 13.1 12.7 14.3   PLATELETS 10*3/mm3 307 320 319     Results from last 7 days   Lab Units 12/03/21  1042 12/02/21  0535 12/01/21  2222   SODIUM mmol/L 139 130* 123*   POTASSIUM mmol/L 3.5 3.6 4.6   CHLORIDE mmol/L 104 94* 87*   CO2 mmol/L 22.2 23.9 21.4*   BUN mg/dL 19 25* 32*   CREATININE mg/dL 1.07* 1.47* 2.09*   GLUCOSE mg/dL 126* 317* 617*     Lab Results   Component Value Date    ANIONGAP 12.8 12/03/2021     Estimated Creatinine Clearance: 46.3 mL/min (A) (by C-G formula based on SCr of 1.07 mg/dL (H)).    Results from last 7 days   Lab Units 12/01/21  2222   ALBUMIN g/dL 4.50   BILIRUBIN mg/dL 0.7   ALK PHOS U/L 128*   AST (SGOT) U/L 14   ALT (SGPT) U/L 14     Results from last 7 days   Lab Units 12/03/21  1042 12/02/21  0535 12/01/21  2222   CALCIUM mg/dL 9.0 9.3 9.5   ALBUMIN g/dL  --   --  4.50       Hemoglobin A1C   Date/Time Value Ref Range Status   12/02/2021 0535 10.90 (H) 4.80 - 5.60 % Final     Glucose   Date/Time Value Ref Range Status   12/04/2021 0600 110 70 - 130 mg/dL Final     Comment:     Meter: [id number] : [id number] Haresh Alfonso S NA   12/04/2021 0038 121 70 - 130 mg/dL Final     Comment:     Meter: [id number] : [id number] Meño Rodgers RN   12/03/2021 2051 185 (H) 70 - 130 mg/dL Final     Comment:     Meter: [id number] : [id number] Alicante Clarisse Martineza S NA   12/03/2021 1619 204 (H) 70 - 130 mg/dL Final     Comment:     RN Notified R and V Meter: [id number] : [id number] Richy HERNANDEZ   12/03/2021 1115 123 70 - 130 mg/dL Final     Comment:     RN Notified R and V Meter: [id number] : [id number] Richy HERNANDEZ   12/03/2021 0624 152 (H) 70 - 130 mg/dL Final     Comment:     Meter: [id number] : [id number] Haresh BREWSTER NA   12/02/2021 2345 214 (H) 70 - 130 mg/dL Final     Comment:      Meter: [id number] : [id number] Haresh BREWSTER NA       Scheduled Meds  atorvastatin, 80 mg, Oral, Nightly  bisoprolol, 5 mg, Oral, Daily  insulin glargine, 60 Units, Subcutaneous, Nightly  insulin lispro, 10 Units, Subcutaneous, TID With Meals  insulin regular, 0-14 Units, Subcutaneous, Q6H  levothyroxine, 112 mcg, Oral, Q AM  pantoprazole, 40 mg, Oral, BID AC  pregabalin, 50 mg, Oral, Q12H  sodium chloride, 10 mL, Intravenous, Q12H    Continuous Infusions   PRN Meds  •  acetaminophen **OR** acetaminophen **OR** acetaminophen  •  calcium carbonate  •  dextrose  •  dextrose  •  glucagon (human recombinant)  •  HYDROcodone-acetaminophen  •  hydrocortisone-bacitracin-zinc oxide-nystatin  •  influenza vaccine  •  nitroglycerin  •  ondansetron **OR** ondansetron  •  [COMPLETED] Insert peripheral IV **AND** sodium chloride  •  sodium chloride     Diet  Diet Regular; Consistent Carbohydrate     Assessment/Plan     Active Hospital Problems    Diagnosis  POA   • **Type II diabetes mellitus, uncontrolled (HCC) [E11.65]  Unknown   • Metabolic encephalopathy [G93.41]  Yes   • VIPUL (acute kidney injury) (HCC) [N17.9]  Unknown   • Type 2 diabetes mellitus, with long-term current use of insulin (HCC) [E11.9, Z79.4]  Not Applicable   • Hyperlipidemia [E78.5]  Yes   • Primary hypothyroidism [E03.9]  Yes   • Benign essential hypertension [I10]  Yes      Resolved Hospital Problems   No resolved problems to display.     81 y.o. female admitted with Type II diabetes mellitus, uncontrolled (HCC).    · Uncontrolled diabetes: Improved. Continue same  · Metabolic encephalopathy resolved  · VIPUL improving  · Hypothyroidism  · Weakness/deconditioning PT  · SCDs for DVT prophylaxis  · Discussed with patient  · Discharge: SNF    Efe Moses MD  [facility]  12/04/21  10:56 EST    I wore protective equipment throughout this patient encounter including a face mask, gloves, and protective eyewear.  Hand  hygiene was performed before donning protective equipment and after removal when leaving the room.

## 2021-12-04 NOTE — PLAN OF CARE
Goal Outcome Evaluation:               Pt. Blood sugars levels controlled at this shift, not given regular insulin at 0000 upon , voiced c/o pain, pain medicine given around 2030, Pt. Alert, oriented x2, confused at times, Pt. Seems be pleasant, encouraged to take care of her Blood  sugars levels,  v/s stable, no s/s distress noted

## 2021-12-05 LAB
ANION GAP SERPL CALCULATED.3IONS-SCNC: 10.5 MMOL/L (ref 5–15)
BUN SERPL-MCNC: 19 MG/DL (ref 8–23)
BUN/CREAT SERPL: 16.1 (ref 7–25)
CALCIUM SPEC-SCNC: 9.3 MG/DL (ref 8.6–10.5)
CHLORIDE SERPL-SCNC: 104 MMOL/L (ref 98–107)
CO2 SERPL-SCNC: 25.5 MMOL/L (ref 22–29)
CREAT SERPL-MCNC: 1.18 MG/DL (ref 0.57–1)
GFR SERPL CREATININE-BSD FRML MDRD: 44 ML/MIN/1.73
GLUCOSE BLDC GLUCOMTR-MCNC: 119 MG/DL (ref 70–130)
GLUCOSE BLDC GLUCOMTR-MCNC: 159 MG/DL (ref 70–130)
GLUCOSE BLDC GLUCOMTR-MCNC: 178 MG/DL (ref 70–130)
GLUCOSE BLDC GLUCOMTR-MCNC: 185 MG/DL (ref 70–130)
GLUCOSE BLDC GLUCOMTR-MCNC: 229 MG/DL (ref 70–130)
GLUCOSE SERPL-MCNC: 109 MG/DL (ref 65–99)
POTASSIUM SERPL-SCNC: 3.6 MMOL/L (ref 3.5–5.2)
SODIUM SERPL-SCNC: 140 MMOL/L (ref 136–145)

## 2021-12-05 PROCEDURE — 63710000001 INSULIN LISPRO (HUMAN) PER 5 UNITS: Performed by: HOSPITALIST

## 2021-12-05 PROCEDURE — 63710000001 INSULIN GLARGINE PER 5 UNITS: Performed by: NURSE PRACTITIONER

## 2021-12-05 PROCEDURE — 63710000001 INSULIN REGULAR HUMAN PER 5 UNITS: Performed by: NURSE PRACTITIONER

## 2021-12-05 PROCEDURE — 82962 GLUCOSE BLOOD TEST: CPT

## 2021-12-05 PROCEDURE — G0378 HOSPITAL OBSERVATION PER HR: HCPCS

## 2021-12-05 PROCEDURE — 80048 BASIC METABOLIC PNL TOTAL CA: CPT | Performed by: HOSPITALIST

## 2021-12-05 RX ORDER — POLYETHYLENE GLYCOL 3350 17 G/17G
17 POWDER, FOR SOLUTION ORAL 2 TIMES DAILY
Status: DISCONTINUED | OUTPATIENT
Start: 2021-12-05 | End: 2021-12-07 | Stop reason: HOSPADM

## 2021-12-05 RX ORDER — LACTULOSE 10 G/15ML
10 SOLUTION ORAL 2 TIMES DAILY
Status: DISCONTINUED | OUTPATIENT
Start: 2021-12-05 | End: 2021-12-07 | Stop reason: HOSPADM

## 2021-12-05 RX ORDER — AMOXICILLIN 250 MG
2 CAPSULE ORAL NIGHTLY
Status: DISCONTINUED | OUTPATIENT
Start: 2021-12-05 | End: 2021-12-07 | Stop reason: HOSPADM

## 2021-12-05 RX ADMIN — PREGABALIN 50 MG: 50 CAPSULE ORAL at 20:50

## 2021-12-05 RX ADMIN — POLYETHYLENE GLYCOL 3350 17 G: 17 POWDER, FOR SOLUTION ORAL at 20:54

## 2021-12-05 RX ADMIN — PREGABALIN 50 MG: 50 CAPSULE ORAL at 08:36

## 2021-12-05 RX ADMIN — PANTOPRAZOLE SODIUM 40 MG: 40 TABLET, DELAYED RELEASE ORAL at 18:04

## 2021-12-05 RX ADMIN — INSULIN LISPRO 10 UNITS: 100 INJECTION, SOLUTION INTRAVENOUS; SUBCUTANEOUS at 08:36

## 2021-12-05 RX ADMIN — DOCUSATE SODIUM 50MG AND SENNOSIDES 8.6MG 2 TABLET: 8.6; 5 TABLET, FILM COATED ORAL at 20:50

## 2021-12-05 RX ADMIN — INSULIN GLARGINE 60 UNITS: 100 INJECTION, SOLUTION SUBCUTANEOUS at 21:11

## 2021-12-05 RX ADMIN — LISINOPRIL 5 MG: 5 TABLET ORAL at 08:36

## 2021-12-05 RX ADMIN — INSULIN HUMAN 5 UNITS: 100 INJECTION, SOLUTION PARENTERAL at 18:04

## 2021-12-05 RX ADMIN — PANTOPRAZOLE SODIUM 40 MG: 40 TABLET, DELAYED RELEASE ORAL at 06:39

## 2021-12-05 RX ADMIN — SODIUM CHLORIDE, PRESERVATIVE FREE 10 ML: 5 INJECTION INTRAVENOUS at 08:39

## 2021-12-05 RX ADMIN — HYDROCHLOROTHIAZIDE 12.5 MG: 12.5 CAPSULE ORAL at 08:36

## 2021-12-05 RX ADMIN — INSULIN LISPRO 10 UNITS: 100 INJECTION, SOLUTION INTRAVENOUS; SUBCUTANEOUS at 12:22

## 2021-12-05 RX ADMIN — INSULIN LISPRO 10 UNITS: 100 INJECTION, SOLUTION INTRAVENOUS; SUBCUTANEOUS at 18:04

## 2021-12-05 RX ADMIN — BISOPROLOL FUMARATE 5 MG: 5 TABLET, FILM COATED ORAL at 08:36

## 2021-12-05 RX ADMIN — SODIUM CHLORIDE, PRESERVATIVE FREE 10 ML: 5 INJECTION INTRAVENOUS at 20:55

## 2021-12-05 RX ADMIN — INSULIN HUMAN 3 UNITS: 100 INJECTION, SOLUTION PARENTERAL at 01:01

## 2021-12-05 RX ADMIN — HYDROCODONE BITARTRATE AND ACETAMINOPHEN 1 TABLET: 10; 325 TABLET ORAL at 12:25

## 2021-12-05 RX ADMIN — INSULIN HUMAN 3 UNITS: 100 INJECTION, SOLUTION PARENTERAL at 12:22

## 2021-12-05 RX ADMIN — LEVOTHYROXINE SODIUM 112 MCG: 0.11 TABLET ORAL at 06:39

## 2021-12-05 RX ADMIN — ATORVASTATIN CALCIUM 80 MG: 80 TABLET, FILM COATED ORAL at 20:50

## 2021-12-05 NOTE — PLAN OF CARE
Goal Outcome Evaluation:  Plan of Care Reviewed With: patient      Patient AOX4 and forgetful at times. Saline locked . NSR on the monitor.Pt's BP was running in the 180s at rest and was asymptomatic. [facility] notified and new orders were placed (Alberto). Bp was rechecked and last BP was 162/62. Pain med was given once. Purewick was placed. Pt encouraged to turn Q2.Glucose monitoring and given insulin. No acute distress and will continue to monitor.

## 2021-12-05 NOTE — PROGRESS NOTES
Name: Halie Guidry ADMIT: 2021   : 1940  PCP: Napoleon Mead MD    MRN: [id number] LOS: 0 days   AGE/SEX: 81 y.o. female  ROOM: [facility]     Subjective   Subjective    Constipation. Denies any nausea or abdominal pain.    Review of Systems   Constitutional: Negative for fever.   Respiratory: Negative for cough and shortness of breath.    Cardiovascular: Negative for chest pain.   Gastrointestinal: Positive for constipation. Negative for abdominal pain and nausea.      Objective   Objective   Vital Signs  Temp:  [97.8 °F (36.6 °C)-98.4 °F (36.9 °C)] 97.8 °F (36.6 °C)  Heart Rate:  [52-63] 52  Resp:  [16-18] 16  BP: (162-197)/(61-97) 171/75  SpO2:  [93 %-98 %] 96 %  on   ;   Device (Oxygen Therapy): room air  Body mass index is 29.6 kg/m².    Physical Exam  Constitutional:       General: She is not in acute distress.     Appearance: Normal appearance. She is not toxic-appearing.   HENT:      Head: Normocephalic and atraumatic.   Cardiovascular:      Rate and Rhythm: Normal rate and regular rhythm.   Pulmonary:      Effort: Pulmonary effort is normal. No respiratory distress.      Breath sounds: Normal breath sounds. No wheezing or rhonchi.   Abdominal:      General: Bowel sounds are normal. There is no distension.      Palpations: Abdomen is soft.      Tenderness: There is no abdominal tenderness. There is no guarding or rebound.   Musculoskeletal:         General: No swelling.      Right lower leg: No edema.      Left lower leg: No edema.   Skin:     General: Skin is warm and dry.   Neurological:      General: No focal deficit present.      Mental Status: She is alert and oriented to person, place, and time.   Psychiatric:         Mood and Affect: Mood normal.         Behavior: Behavior normal.     Results Review  I reviewed the patient's new clinical results.  Results from last 7 days   Lab Units 21  0701 21  0535 21  2222   WBC 10*3/mm3 9.83 12.96* 12.85*   HEMOGLOBIN g/dL  13.1 12.7 14.3   PLATELETS 10*3/mm3 307 320 319     Results from last 7 days   Lab Units 12/05/21  0759 12/03/21  1042 12/02/21  0535 12/01/21  2222   SODIUM mmol/L 140 139 130* 123*   POTASSIUM mmol/L 3.6 3.5 3.6 4.6   CHLORIDE mmol/L 104 104 94* 87*   CO2 mmol/L 25.5 22.2 23.9 21.4*   BUN mg/dL 19 19 25* 32*   CREATININE mg/dL 1.18* 1.07* 1.47* 2.09*   GLUCOSE mg/dL 109* 126* 317* 617*     Lab Results   Component Value Date    ANIONGAP 10.5 12/05/2021     Estimated Creatinine Clearance: 42 mL/min (A) (by C-G formula based on SCr of 1.18 mg/dL (H)).    Results from last 7 days   Lab Units 12/01/21  2222   ALBUMIN g/dL 4.50   BILIRUBIN mg/dL 0.7   ALK PHOS U/L 128*   AST (SGOT) U/L 14   ALT (SGPT) U/L 14     Results from last 7 days   Lab Units 12/05/21  0759 12/03/21  1042 12/02/21  0535 12/01/21  2222   CALCIUM mg/dL 9.3 9.0 9.3 9.5   ALBUMIN g/dL  --   --   --  4.50       Glucose   Date/Time Value Ref Range Status   12/05/2021 1100 178 (H) 70 - 130 mg/dL Final     Comment:     Meter: [id number] : [id number] Vimal HERNANDEZ   12/05/2021 0610 119 70 - 130 mg/dL Final     Comment:     Meter: [id number] : [id number] Mikey Cuadra HOLLY   12/05/2021 0046 185 (H) 70 - 130 mg/dL Final     Comment:     Meter: [id number] : [id number] Ramon Szymanski RN   12/04/2021 2123 196 (H) 70 - 130 mg/dL Final     Comment:     Meter: [id number] : [id number] Mikey Cuadra HOLLY   12/04/2021 1617 156 (H) 70 - 130 mg/dL Final     Comment:     Meter: [id number] : [id number] Saundra HERNANDEZ   12/04/2021 1110 233 (H) 70 - 130 mg/dL Final     Comment:     Meter: [id number] : [id number] Saundra HERNANDEZ   12/04/2021 0600 110 70 - 130 mg/dL Final     Comment:     Meter: [id number] : [id number] Haresh HERNANDEZ       Scheduled Meds  atorvastatin, 80 mg, Oral, Nightly  bisoprolol, 5 mg, Oral, Daily  hydroCHLOROthiazide, 12.5 mg, Oral, Daily  insulin glargine, 60 Units, Subcutaneous, Nightly  insulin  lispro, 10 Units, Subcutaneous, TID With Meals  insulin regular, 0-14 Units, Subcutaneous, Q6H  levothyroxine, 112 mcg, Oral, Q AM  lisinopril, 5 mg, Oral, Daily  pantoprazole, 40 mg, Oral, BID AC  pregabalin, 50 mg, Oral, Q12H  sodium chloride, 10 mL, Intravenous, Q12H    Continuous Infusions   PRN Meds  •  acetaminophen **OR** acetaminophen **OR** acetaminophen  •  calcium carbonate  •  dextrose  •  dextrose  •  glucagon (human recombinant)  •  HYDROcodone-acetaminophen  •  hydrocortisone-bacitracin-zinc oxide-nystatin  •  influenza vaccine  •  nitroglycerin  •  ondansetron **OR** ondansetron  •  [COMPLETED] Insert peripheral IV **AND** sodium chloride  •  sodium chloride     Diet  Diet Regular; Consistent Carbohydrate     Assessment/Plan     Active Hospital Problems    Diagnosis  POA   • **Type II diabetes mellitus, uncontrolled ([facility]) [E11.65]  Unknown   • Metabolic encephalopathy [G93.41]  Yes   • VIPUL (acute kidney injury) ([facility]) [N17.9]  Unknown   • Type 2 diabetes mellitus, with long-term current use of insulin ([facility]) [E11.9, Z79.4]  Not Applicable   • Hyperlipidemia [E78.5]  Yes   • Primary hypothyroidism [E03.9]  Yes   • Benign essential hypertension [I10]  Yes      Resolved Hospital Problems   No resolved problems to display.     81 y.o. female admitted with Type II diabetes mellitus, uncontrolled ([facility]).    · Uncontrolled diabetes: Improved. Continue same  · Metabolic encephalopathy resolved  · VIPUL continue to monitor  · Constipation: Bowel medication  · Hypothyroidism  · Weakness/deconditioning PT  · SCDs for DVT prophylaxis  · Discussed with patient and nursing staff  · Discharge: [facility] Calvin Moses MD  [facility]  12/05/21  12:54 EST    I wore protective equipment throughout this patient encounter including a face mask, gloves, and protective eyewear.  Hand hygiene was performed before donning protective equipment and after removal when leaving the room.

## 2021-12-05 NOTE — PLAN OF CARE
Goal Outcome Evaluation:  Plan of Care Reviewed With: patient           Outcome Summary: Pt BP meds given this AM.  BP better than afternoon.  Insulin given as ordered.  PP for comfort for generalized pain.  Waiting for Precert.  Pt up in chair most of the day.  SB on monitor.  Will cont to monitor.

## 2021-12-06 LAB
ANION GAP SERPL CALCULATED.3IONS-SCNC: 12 MMOL/L (ref 5–15)
BUN SERPL-MCNC: 23 MG/DL (ref 8–23)
BUN/CREAT SERPL: 16.4 (ref 7–25)
CALCIUM SPEC-SCNC: 8.8 MG/DL (ref 8.6–10.5)
CHLORIDE SERPL-SCNC: 104 MMOL/L (ref 98–107)
CO2 SERPL-SCNC: 24 MMOL/L (ref 22–29)
CREAT SERPL-MCNC: 1.4 MG/DL (ref 0.57–1)
DEPRECATED RDW RBC AUTO: 41.9 FL (ref 37–54)
ERYTHROCYTE [DISTWIDTH] IN BLOOD BY AUTOMATED COUNT: 13.4 % (ref 12.3–15.4)
GFR SERPL CREATININE-BSD FRML MDRD: 36 ML/MIN/1.73
GLUCOSE BLDC GLUCOMTR-MCNC: 174 MG/DL (ref 70–130)
GLUCOSE BLDC GLUCOMTR-MCNC: 184 MG/DL (ref 70–130)
GLUCOSE BLDC GLUCOMTR-MCNC: 203 MG/DL (ref 70–130)
GLUCOSE BLDC GLUCOMTR-MCNC: 222 MG/DL (ref 70–130)
GLUCOSE BLDC GLUCOMTR-MCNC: 82 MG/DL (ref 70–130)
GLUCOSE SERPL-MCNC: 91 MG/DL (ref 65–99)
HCT VFR BLD AUTO: 36.9 % (ref 34–46.6)
HGB BLD-MCNC: 12.5 G/DL (ref 12–15.9)
MCH RBC QN AUTO: 29.3 PG (ref 26.6–33)
MCHC RBC AUTO-ENTMCNC: 33.9 G/DL (ref 31.5–35.7)
MCV RBC AUTO: 86.4 FL (ref 79–97)
PLATELET # BLD AUTO: 306 10*3/MM3 (ref 140–450)
PMV BLD AUTO: 9.9 FL (ref 6–12)
POTASSIUM SERPL-SCNC: 3.8 MMOL/L (ref 3.5–5.2)
RBC # BLD AUTO: 4.27 10*6/MM3 (ref 3.77–5.28)
SODIUM SERPL-SCNC: 140 MMOL/L (ref 136–145)
WBC NRBC COR # BLD: 10.45 10*3/MM3 (ref 3.4–10.8)

## 2021-12-06 PROCEDURE — 82962 GLUCOSE BLOOD TEST: CPT

## 2021-12-06 PROCEDURE — 97110 THERAPEUTIC EXERCISES: CPT

## 2021-12-06 PROCEDURE — 85027 COMPLETE CBC AUTOMATED: CPT | Performed by: HOSPITALIST

## 2021-12-06 PROCEDURE — G0378 HOSPITAL OBSERVATION PER HR: HCPCS

## 2021-12-06 PROCEDURE — 63710000001 INSULIN GLARGINE PER 5 UNITS: Performed by: NURSE PRACTITIONER

## 2021-12-06 PROCEDURE — 80048 BASIC METABOLIC PNL TOTAL CA: CPT | Performed by: HOSPITALIST

## 2021-12-06 PROCEDURE — 63710000001 INSULIN REGULAR HUMAN PER 5 UNITS: Performed by: NURSE PRACTITIONER

## 2021-12-06 PROCEDURE — 63710000001 INSULIN LISPRO (HUMAN) PER 5 UNITS: Performed by: HOSPITALIST

## 2021-12-06 RX ADMIN — INSULIN HUMAN 5 UNITS: 100 INJECTION, SOLUTION PARENTERAL at 17:20

## 2021-12-06 RX ADMIN — INSULIN HUMAN 3 UNITS: 100 INJECTION, SOLUTION PARENTERAL at 12:24

## 2021-12-06 RX ADMIN — HYDROCHLOROTHIAZIDE 12.5 MG: 12.5 CAPSULE ORAL at 09:10

## 2021-12-06 RX ADMIN — PANTOPRAZOLE SODIUM 40 MG: 40 TABLET, DELAYED RELEASE ORAL at 06:31

## 2021-12-06 RX ADMIN — PREGABALIN 50 MG: 50 CAPSULE ORAL at 09:10

## 2021-12-06 RX ADMIN — LISINOPRIL 5 MG: 5 TABLET ORAL at 09:11

## 2021-12-06 RX ADMIN — HYDROCODONE BITARTRATE AND ACETAMINOPHEN 1 TABLET: 10; 325 TABLET ORAL at 14:59

## 2021-12-06 RX ADMIN — ATORVASTATIN CALCIUM 80 MG: 80 TABLET, FILM COATED ORAL at 21:34

## 2021-12-06 RX ADMIN — INSULIN HUMAN 3 UNITS: 100 INJECTION, SOLUTION PARENTERAL at 00:27

## 2021-12-06 RX ADMIN — PREGABALIN 50 MG: 50 CAPSULE ORAL at 21:35

## 2021-12-06 RX ADMIN — HYDROCODONE BITARTRATE AND ACETAMINOPHEN 1 TABLET: 10; 325 TABLET ORAL at 00:39

## 2021-12-06 RX ADMIN — INSULIN LISPRO 10 UNITS: 100 INJECTION, SOLUTION INTRAVENOUS; SUBCUTANEOUS at 12:24

## 2021-12-06 RX ADMIN — LEVOTHYROXINE SODIUM 112 MCG: 0.11 TABLET ORAL at 06:26

## 2021-12-06 RX ADMIN — INSULIN GLARGINE 60 UNITS: 100 INJECTION, SOLUTION SUBCUTANEOUS at 21:35

## 2021-12-06 RX ADMIN — INSULIN LISPRO 10 UNITS: 100 INJECTION, SOLUTION INTRAVENOUS; SUBCUTANEOUS at 17:20

## 2021-12-06 RX ADMIN — SODIUM CHLORIDE, PRESERVATIVE FREE 10 ML: 5 INJECTION INTRAVENOUS at 21:35

## 2021-12-06 RX ADMIN — PANTOPRAZOLE SODIUM 40 MG: 40 TABLET, DELAYED RELEASE ORAL at 17:20

## 2021-12-06 RX ADMIN — INSULIN LISPRO 10 UNITS: 100 INJECTION, SOLUTION INTRAVENOUS; SUBCUTANEOUS at 09:10

## 2021-12-06 NOTE — PLAN OF CARE
Problem: Fluid Volume Deficit  Goal: Fluid Balance  Outcome: Ongoing, Progressing     Problem: Diabetes Comorbidity  Goal: Blood Glucose Level Within Desired Range  Outcome: Ongoing, Progressing     Problem: Pain Chronic (Persistent) (Comorbidity Management)  Goal: Acceptable Pain Control and Functional Ability  Outcome: Ongoing, Progressing     Problem: Fall Injury Risk  Goal: Absence of Fall and Fall-Related Injury  Outcome: Ongoing, Progressing  Intervention: Promote Injury-Free Environment  Recent Flowsheet Documentation  Taken 12/6/2021 1822 by Beena Wyatt RN  Safety Promotion/Fall Prevention:   fall prevention program maintained   safety round/check completed  Taken 12/6/2021 1600 by Beena Wyatt RN  Safety Promotion/Fall Prevention:   fall prevention program maintained   safety round/check completed  Taken 12/6/2021 1421 by Beena Wyatt RN  Safety Promotion/Fall Prevention:   fall prevention program maintained   safety round/check completed  Taken 12/6/2021 1200 by Beena Wyatt RN  Safety Promotion/Fall Prevention:   fall prevention program maintained   safety round/check completed  Taken 12/6/2021 1030 by Beena Wyatt RN  Safety Promotion/Fall Prevention:   fall prevention program maintained   safety round/check completed  Taken 12/6/2021 0919 by Beena Wyatt RN  Safety Promotion/Fall Prevention:   fall prevention program maintained   safety round/check completed  Taken 12/6/2021 0800 by Beena Wyatt RN  Safety Promotion/Fall Prevention: fall prevention program maintained     Problem: Skin Injury Risk Increased  Goal: Skin Health and Integrity  Outcome: Ongoing, Progressing     Problem: Adult Inpatient Plan of Care  Goal: Plan of Care Review  Outcome: Ongoing, Progressing  Flowsheets (Taken 12/6/2021 1822)  Plan of Care Reviewed With: patient  Outcome Summary: Assist x2 to stand. Med x1 for c/o back pain. Oriented x4, forgetful. D/C to rehab when bed  available.  Goal: Patient-Specific Goal (Individualized)  Outcome: Ongoing, Progressing  Goal: Absence of Hospital-Acquired Illness or Injury  Outcome: Ongoing, Progressing  Intervention: Identify and Manage Fall Risk  Recent Flowsheet Documentation  Taken 12/6/2021 1822 by Beean Wyatt RN  Safety Promotion/Fall Prevention:   fall prevention program maintained   safety round/check completed  Taken 12/6/2021 1600 by Beena Wyatt RN  Safety Promotion/Fall Prevention:   fall prevention program maintained   safety round/check completed  Taken 12/6/2021 1421 by Beena Wyatt RN  Safety Promotion/Fall Prevention:   fall prevention program maintained   safety round/check completed  Taken 12/6/2021 1200 by Beena Wyatt RN  Safety Promotion/Fall Prevention:   fall prevention program maintained   safety round/check completed  Taken 12/6/2021 1030 by Beena Wyatt RN  Safety Promotion/Fall Prevention:   fall prevention program maintained   safety round/check completed  Taken 12/6/2021 0919 by Beena Wyatt RN  Safety Promotion/Fall Prevention:   fall prevention program maintained   safety round/check completed  Taken 12/6/2021 0800 by Beena Wyatt RN  Safety Promotion/Fall Prevention: fall prevention program maintained  Goal: Optimal Comfort and Wellbeing  Outcome: Ongoing, Progressing  Goal: Readiness for Transition of Care  Outcome: Ongoing, Progressing   Goal Outcome Evaluation:  Plan of Care Reviewed With: patient        Progress: improving  Outcome Summary: Assist x2 to stand. Med x1 for c/o back pain. Oriented x4, forgetful. D/C to rehab when bed available.

## 2021-12-06 NOTE — THERAPY TREATMENT NOTE
Patient Name: Halie Guidry  : 1940    MRN: [id number]                              Today's Date: 2021       Admit Date: 2021    Visit Dx:     ICD-10-CM ICD-9-CM   1. Metabolic encephalopathy  G93.41 348.31   2. Hyperglycemia  R73.9 790.29   3. VIPUL (acute kidney injury) ([facility])  N17.9 584.9   4. Type 2 diabetes mellitus with other circulatory complication, with long-term current use of insulin ([facility])  E11.59 250.70    Z79.4 V58.67     Patient Active Problem List   Diagnosis   • Compression fracture   • Lumbar degenerative disc disease   • Type 2 diabetes mellitus, with long-term current use of insulin ([facility])   • Hyperlipidemia   • Benign essential hypertension   • Primary hypothyroidism   • Leukocytosis   • Neuropathy   • Osteoporosis   • Proteinuria   • Tobacco abuse   • Diabetic eye exam ([facility])   • Generalized weakness   • Spinal stenosis   • Scoliosis   • Arthritis   • VBI (vertebrobasilar insufficiency)   • Orthostatic hypotension   • 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 ([facility])   • Severe hypothyroidism   • Noncompliance   • Vitamin D deficiency disease   • Altered mental status   • Tremor   • Seizure ([facility])   • Chronic kidney disease, stage 2 (mild)   • Thoracic degenerative disc disease   • Metabolic encephalopathy   • VIPUL (acute kidney injury) ([facility])   • Hyperglycemia   • Type II diabetes mellitus, uncontrolled ([facility])     Past Medical History:   Diagnosis Date   • Anxiety    • Arthritis    • Benign essential hypertension 2014   • 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[facility])    • Depression    • Diabetes ([facility])    • Diabetes mellitus, type 2 ([facility])    • Disc degeneration, lumbar    • Headache, tension-type    • Hyperlipidemia    • Hypothyroidism    • Neuropathy    • Osteoporosis 2015   • Peripheral neuropathy    • Rotator cuff tear, left    • Scoliosis    • Shoulder pain     LEFT, TORN ROTATOR CUFF S/P FALL   • Spinal stenosis      Past Surgical History:   Procedure Laterality Date   •  APPENDECTOMY     • BILATERAL BREAST REDUCTION Bilateral 08/2015   • CATARACT EXTRACTION  03/2015   • COLONOSCOPY  06/05/2015    WNL   • KYPHOPLASTY     • REDUCTION MAMMAPLASTY     • TONSILLECTOMY        General Information     [facility] Name 12/06/21 1206          Physical Therapy Time and Intention    Document Type therapy note (daily note)  -     Mode of Treatment individual therapy; physical therapy  -     Row Name 12/06/21 1206          General Information    Patient Profile Reviewed yes  -     Existing Precautions/Restrictions fall  -[facility] Name 12/06/21 1206          Cognition    Orientation Status (Cognition) oriented x 3  -[facility] Name 12/06/21 1206          Safety Issues, Functional Mobility    Safety Issues Affecting Function (Mobility) awareness of need for assistance; insight into deficits/self-awareness; judgment; positioning of assistive device; problem-solving; safety precaution awareness  -     Impairments Affecting Function (Mobility) balance; endurance/activity tolerance; strength  -           User Key  (r) = Recorded By, (t) = Taken By, (c) = Cosigned By    Initials Name Provider Type     Beena Ray PTA Physical Therapy Assistant               Mobility     Row Name 12/06/21 1206          Bed Mobility    Supine-Sit [place] (Bed Mobility) contact guard; verbal cues  needed extra time for scooting  -     Sit-Supine [place] (Bed Mobility) not tested  -     Assistive Device (Bed Mobility) bed rails; head of bed elevated  -     Comment (Bed Mobility) pt did improve w/tfer supine to sit; heavy use of rail  -[facility] Name 12/06/21 1206          Sit-Stand Transfer    Sit-Stand [place] (Transfers) 2 person assist; moderate assist (50% patient effort); verbal cues; nonverbal cues (demo/gesture)  required 2 attempts  -     Assistive Device (Sit-Stand Transfers) walker, front-wheeled  -[facility] Name 12/06/21 1206          Gait/Stairs (Locomotion)    [place]  Level (Gait) 2 person assist; minimum assist (75% patient effort); verbal cues; nonverbal cues (demo/gesture)  -     Assistive Device (Gait) walker, front-wheeled  -     Distance in Feet (Gait) 50ft w/2 brief standing rests, some assist to guide rwx around objects  -     Deviations/Abnormal Patterns (Gait) tess decreased; stride length decreased; weight shifting decreased  -     Bilateral Gait Deviations forward flexed posture  -           User Key  (r) = Recorded By, (t) = Taken By, (c) = Cosigned By    Initials Name Provider Type    Beena Downs PTA Physical Therapy Assistant               Obj/Interventions    No documentation.                Goals/Plan    No documentation.                Clinical Impression     Row Name 12/06/21 1212          Pain Scale: Numbers Pre/Post-Treatment    Pretreatment Pain Rating 4/10  -     Posttreatment Pain Rating 5/10  -     Pain Location back  -     Pre/Posttreatment Pain Comment chronic back pain  -     Pain Intervention(s) Repositioned; Ambulation/increased activity; Rest  -     Row Name 12/06/21 1212          Plan of Care Review    Plan of Care Reviewed With patient  -     Progress improving  -     Outcome Summary Pt agreed to PT session after encouragement, pt improved on supine to sit w/use of rail and elevated HOB, still requires MOD 2 STS tfers; pt incr amb dist to 45ft but required assist to guide rwx , severe forw flex-educ offered, but pt has chronic back pain and posture bad for yrs per pt; pt plans SNU at [place] as son will not be available 24/7 and currently pt is assist of 2  -     Row Name 12/06/21 1212          Therapy Assessment/Plan (PT)    Rehab Potential (PT) good, to achieve stated therapy goals  -     Criteria for Skilled Interventions Met (PT) yes  -     Row Name 12/06/21 1212          Vital Signs    O2 Delivery Pre Treatment room air  -     Row Name 12/06/21 1212          Positioning and Restraints    Pre-Treatment  Position in bed  -JM     Post Treatment Position chair  -JM     In Chair reclined; call light within reach; encouraged to call for assist; exit alarm on; notified nsg  -           User Key  (r) = Recorded By, (t) = Taken By, (c) = Cosigned By    Initials Name Provider Type    Beena Downs PTA Physical Therapy Assistant               Outcome Measures     Row Name 12/06/21 1223          How much help from another person do you currently need...    Turning from your back to your side while in flat bed without using bedrails? 3  -JM     Moving from lying on back to sitting on the side of a flat bed without bedrails? 2  -JM     Moving to and from a bed to a chair (including a wheelchair)? 3  -JM     Standing up from a chair using your arms (e.g., wheelchair, bedside chair)? 2  -JM     Climbing 3-5 steps with a railing? 1  -JM     To walk in hospital room? 3  -JM     AM-PAC 6 Clicks Score (PT) 14  -JOSE ALBERTO           User Key  (r) = Recorded By, (t) = Taken By, (c) = Cosigned By    Initials Name Provider Type    Beena Downs PTA Physical Therapy Assistant                             Physical Therapy Education                 Title: PT OT SLP Therapies (Done)     Topic: Physical Therapy (Done)     Point: Mobility training (Done)     Learning Progress Summary           Patient Acceptance, E,TB,D, VU,NR by JOSE ALBERTO at 12/6/2021 1224    Acceptance, E,TB, VU by BRYSON at 12/4/2021 1315    Acceptance, E, VU,NR by CF at 12/3/2021 1529                   Point: Home exercise program (Done)     Learning Progress Summary           Patient Acceptance, E,TB,D, VU,NR by JOSE ALBERTO at 12/6/2021 1224    Acceptance, E,TB, VU by BRYSON at 12/4/2021 1315    Acceptance, E, VU,NR by CF at 12/3/2021 1529                   Point: Body mechanics (Done)     Learning Progress Summary           Patient Acceptance, E,TB,D, VU,NR by JOSE ALBERTO at 12/6/2021 1224    Acceptance, E,TB, VU by BRYSON at 12/4/2021 1315    Acceptance, E, VU,NR by CF at 12/3/2021 1529                    Point: Precautions (Done)     Learning Progress Summary           Patient Acceptance, E,TB,D, VU,NR by  at 12/6/2021 1224    Acceptance, E,TB, VU by  at 12/4/2021 1315    Acceptance, E, VU,NR by  at 12/3/2021 1529                               User Key     Initials Effective Dates Name Provider Type Discipline     03/07/18 -  Beena Ray PTA Physical Therapy Assistant PT    RW 06/16/21 -  Maksim Daniels, RN Registered Nurse Nurse    CF 06/16/21 -  Hetal Velasco, MELODIE Physical Therapist PT              PT Recommendation and Plan     Plan of Care Reviewed With: patient  Progress: improving  Outcome Summary: Pt agreed to PT session after encouragement, pt improved on supine to sit w/use of rail and elevated HOB, still requires MOD 2 STS tfers; pt incr amb dist to 45ft but required assist to guide rwx , severe forw flex-educ offered, but pt has chronic back pain and posture bad for yrs per pt; pt plans SNU at [place] as son will not be available 24/7 and currently pt is assist of 2     Time Calculation:    PT Charges     Row Name 12/06/21 1225             Time Calculation    Start Time 1130  -      Stop Time 1155  -      Time Calculation (min) 25 min  -      PT Received On 12/06/21  -      PT - Next Appointment 12/07/21  -            User Key  (r) = Recorded By, (t) = Taken By, (c) = Cosigned By    Initials Name Provider Type     Beena Ray PTA Physical Therapy Assistant              Therapy Charges for Today     Code Description Service Date Service Provider Modifiers Qty    72198369104 HC PT THER PROC EA 15 MIN 12/6/2021 Beena Ray PTA GP 2    58401681811 HC PT THER SUPP EA 15 MIN 12/6/2021 Beena Ray PTA GP 2          PT G-Codes  Outcome Measure Options: AM-PAC 6 Clicks Basic Mobility (PT)  AM-PAC 6 Clicks Score (PT): 14    Beena Ray PTA  12/6/2021

## 2021-12-06 NOTE — PLAN OF CARE
Goal Outcome Evaluation:            Pt. Alert, oriented x3, confused at times, voiced c/o pain, pain medicine given at last night as ordered, v/s stable, no s/s acute distress noted

## 2021-12-06 NOTE — PLAN OF CARE
Goal Outcome Evaluation:  Plan of Care Reviewed With: patient        Progress: improving  Outcome Summary: Pt agreed to PT session after encouragement, pt improved on supine to sit w/use of rail and elevated HOB, still requires MOD 2 STS tfers; pt incr amb dist to 45ft but required assist to guide rwx , severe forw flex-educ offered, but pt has chronic back pain and posture bad for yrs per pt; pt plans SNU at [place] as son will not be available 24/7 and currently pt is assist of 2    Patient was wearing a face mask during this therapy encounter. Therapist used appropriate personal protective equipment including eye protection, mask, and gloves.  Mask used was standard procedure mask. Appropriate PPE was worn during the entire therapy session. Hand hygiene was completed before and after therapy session. Patient is not in enhanced droplet precautions.

## 2021-12-06 NOTE — PROGRESS NOTES
Name: Halie Guidry ADMIT: 2021   : 1940  PCP: Napoleon Mead MD    MRN: [id number] LOS: 0 days   AGE/SEX: 81 y.o. female  ROOM: [facility]     Subjective   Subjective        Patient is lying in bed in no major distress.  No new events overnight.  No reports of nausea, vomiting abdominal pain, chest pain.    Review of Systems   Constitutional: Negative for fever.   Respiratory: Negative for cough and shortness of breath.    Cardiovascular: Negative for chest pain.   Gastrointestinal: Positive for constipation. Negative for abdominal pain and nausea.      Objective   Objective   Vital Signs  Temp:  [97.5 °F (36.4 °C)-98.6 °F (37 °C)] 98.1 °F (36.7 °C)  Heart Rate:  [53-71] 71  Resp:  [16-18] 18  BP: (149-181)/(71-88) 180/88  SpO2:  [95 %-97 %] 97 %  on   ;   Device (Oxygen Therapy): room air  Body mass index is 29.6 kg/m².    Physical Exam  Constitutional:       General: She is not in acute distress.     Appearance: Normal appearance. She is not toxic-appearing.   HENT:      Head: Normocephalic and atraumatic.   Cardiovascular:      Rate and Rhythm: Normal rate and regular rhythm.   Pulmonary:      Effort: Pulmonary effort is normal. No respiratory distress.      Breath sounds: Normal breath sounds. No wheezing or rhonchi.   Abdominal:      General: Bowel sounds are normal. There is no distension.      Palpations: Abdomen is soft.      Tenderness: There is no abdominal tenderness. There is no guarding or rebound.   Musculoskeletal:         General: No swelling.      Right lower leg: No edema.      Left lower leg: No edema.   Skin:     General: Skin is warm and dry.   Neurological:      General: No focal deficit present.      Mental Status: She is alert and oriented to person, place, and time.   Psychiatric:         Mood and Affect: Mood normal.         Behavior: Behavior normal.     Results Review  I reviewed the patient's new clinical results.  Results from last 7 days   Lab Units 21  7448  12/03/21  0701 12/02/21  0535 12/01/21  2222   WBC 10*3/mm3 10.45 9.83 12.96* 12.85*   HEMOGLOBIN g/dL 12.5 13.1 12.7 14.3   PLATELETS 10*3/mm3 306 307 320 319     Results from last 7 days   Lab Units 12/06/21  0504 12/05/21  0759 12/03/21  1042 12/02/21  0535   SODIUM mmol/L 140 140 139 130*   POTASSIUM mmol/L 3.8 3.6 3.5 3.6   CHLORIDE mmol/L 104 104 104 94*   CO2 mmol/L 24.0 25.5 22.2 23.9   BUN mg/dL 23 19 19 25*   CREATININE mg/dL 1.40* 1.18* 1.07* 1.47*   GLUCOSE mg/dL 91 109* 126* 317*     Lab Results   Component Value Date    ANIONGAP 12.0 12/06/2021     Estimated Creatinine Clearance: 35.4 mL/min (A) (by C-G formula based on SCr of 1.4 mg/dL (H)).    Results from last 7 days   Lab Units 12/01/21  2222   ALBUMIN g/dL 4.50   BILIRUBIN mg/dL 0.7   ALK PHOS U/L 128*   AST (SGOT) U/L 14   ALT (SGPT) U/L 14     Results from last 7 days   Lab Units 12/06/21  0504 12/05/21  0759 12/03/21  1042 12/02/21  0535 12/01/21  2222 12/01/21  2222   CALCIUM mg/dL 8.8 9.3 9.0 9.3   < > 9.5   ALBUMIN g/dL  --   --   --   --   --  4.50    < > = values in this interval not displayed.       Glucose   Date/Time Value Ref Range Status   12/06/2021 1647 203 (H) 70 - 130 mg/dL Final     Comment:     Meter: [id number] : [id number] Saundra HERNANDEZ   12/06/2021 1125 184 (H) 70 - 130 mg/dL Final     Comment:     Meter: [id number] : [id number] Saundra HERNANDEZ   12/06/2021 0550 82 70 - 130 mg/dL Final     Comment:     Meter: [id number] : [id number] Mikey Urenaashwin HOLLY   12/06/2021 0022 174 (H) 70 - 130 mg/dL Final     Comment:     Meter: [id number] : [id number] Meño Rodgers RN   12/05/2021 2049 159 (H) 70 - 130 mg/dL Final     Comment:     Meter: [id number] : [id number] Mikey Cuadra HOLLY   12/05/2021 1600 229 (H) 70 - 130 mg/dL Final     Comment:     Meter: [id number] : [id number] Vimal HERNANDEZ   12/05/2021 1100 178 (H) 70 - 130 mg/dL Final     Comment:     Meter: [id number] : [id number] Vimal HERNANDEZ        Scheduled Meds  atorvastatin, 80 mg, Oral, Nightly  bisoprolol, 5 mg, Oral, Daily  hydroCHLOROthiazide, 12.5 mg, Oral, Daily  insulin glargine, 60 Units, Subcutaneous, Nightly  insulin lispro, 10 Units, Subcutaneous, TID With Meals  insulin regular, 0-14 Units, Subcutaneous, Q6H  lactulose, 10 g, Oral, BID  levothyroxine, 112 mcg, Oral, Q AM  lisinopril, 5 mg, Oral, Daily  pantoprazole, 40 mg, Oral, BID AC  polyethylene glycol, 17 g, Oral, BID  pregabalin, 50 mg, Oral, Q12H  senna-docusate sodium, 2 tablet, Oral, Nightly  sodium chloride, 10 mL, Intravenous, Q12H    Continuous Infusions   PRN Meds  •  acetaminophen **OR** acetaminophen **OR** acetaminophen  •  calcium carbonate  •  dextrose  •  dextrose  •  glucagon (human recombinant)  •  HYDROcodone-acetaminophen  •  hydrocortisone-bacitracin-zinc oxide-nystatin  •  influenza vaccine  •  nitroglycerin  •  ondansetron **OR** ondansetron  •  [COMPLETED] Insert peripheral IV **AND** sodium chloride  •  sodium chloride     Diet  Diet Regular; Consistent Carbohydrate     Assessment/Plan     Active Hospital Problems    Diagnosis  POA   • **Type II diabetes mellitus, uncontrolled (HCC) [E11.65]  Unknown   • Metabolic encephalopathy [G93.41]  Yes   • VIPUL (acute kidney injury) (HCC) [N17.9]  Unknown   • Type 2 diabetes mellitus, with long-term current use of insulin (HCC) [E11.9, Z79.4]  Not Applicable   • Hyperlipidemia [E78.5]  Yes   • Primary hypothyroidism [E03.9]  Yes   • Benign essential hypertension [I10]  Yes      Resolved Hospital Problems   No resolved problems to display.     81 y.o. female admitted with Type II diabetes mellitus, uncontrolled ([facility]).    · Uncontrolled diabetes:   Better controlled on Lantus and corrective dose insulin.  · Metabolic encephalopathy , mental status is at baseline now.  · VIPUL continue to monitor  · Constipation: Bowel medication  · Hypothyroidism , on Synthroid which is being continued.  · Hypertension, on bisoprolol, HCTZ,  lisinopril which will be continued.  · Weakness/deconditioning PT  · SCDs for DVT prophylaxis  · Discussed with patient and nursing staff  · Discharge: SNF  Once a bed is available    Kelechi Sanchez MD  [facility]  12/06/21  17:14 EST    I wore protective equipment throughout this patient encounter including a face mask, gloves, and protective eyewear.  Hand hygiene was performed before donning protective equipment and after removal when leaving the room.

## 2021-12-06 NOTE — CASE MANAGEMENT/SOCIAL WORK
Continued Stay Note  [facility]     Patient Name: Halie Guidry  MRN: [id number]  Today's Date: 12/6/2021    Admit Date: 12/1/2021     Discharge Plan     Row Name 12/06/21 1026       Plan    Plan Pending pre-cert for [facility]    Plan Comments CCP spoke with Mame/[facility]; able to accept and will initiate pre-cert. CCP met with patient at bedside. Patient in agreement to [facility]. CCP spoke with patient's son, Derrick and updated him on [facility] starting pre-cert. He is in agreement to d/c plan. CCP will follow for pre-cert and will need to arrange WC van. Packet in CCP office. Nichole BAIG               Discharge Codes    No documentation.               Expected Discharge Date and Time     Expected Discharge Date Expected Discharge Time    Dec 3, 2021             SAFIA Kearney

## 2021-12-07 VITALS
WEIGHT: 189 LBS | BODY MASS INDEX: 29.66 KG/M2 | HEIGHT: 67 IN | TEMPERATURE: 97.5 F | RESPIRATION RATE: 18 BRPM | DIASTOLIC BLOOD PRESSURE: 91 MMHG | HEART RATE: 57 BPM | SYSTOLIC BLOOD PRESSURE: 145 MMHG | OXYGEN SATURATION: 96 %

## 2021-12-07 LAB
B PARAPERT DNA SPEC QL NAA+PROBE: NOT DETECTED
B PERT DNA SPEC QL NAA+PROBE: NOT DETECTED
C PNEUM DNA NPH QL NAA+NON-PROBE: NOT DETECTED
FLUAV SUBTYP SPEC NAA+PROBE: NOT DETECTED
FLUBV RNA ISLT QL NAA+PROBE: NOT DETECTED
GLUCOSE BLDC GLUCOMTR-MCNC: 149 MG/DL (ref 70–130)
GLUCOSE BLDC GLUCOMTR-MCNC: 265 MG/DL (ref 70–130)
GLUCOSE BLDC GLUCOMTR-MCNC: 300 MG/DL (ref 70–130)
HADV DNA SPEC NAA+PROBE: NOT DETECTED
HCOV 229E RNA SPEC QL NAA+PROBE: NOT DETECTED
HCOV HKU1 RNA SPEC QL NAA+PROBE: NOT DETECTED
HCOV NL63 RNA SPEC QL NAA+PROBE: NOT DETECTED
HCOV OC43 RNA SPEC QL NAA+PROBE: NOT DETECTED
HMPV RNA NPH QL NAA+NON-PROBE: NOT DETECTED
HPIV1 RNA ISLT QL NAA+PROBE: NOT DETECTED
HPIV2 RNA SPEC QL NAA+PROBE: NOT DETECTED
HPIV3 RNA NPH QL NAA+PROBE: NOT DETECTED
HPIV4 P GENE NPH QL NAA+PROBE: NOT DETECTED
M PNEUMO IGG SER IA-ACNC: NOT DETECTED
RHINOVIRUS RNA SPEC NAA+PROBE: NOT DETECTED
RSV RNA NPH QL NAA+NON-PROBE: NOT DETECTED
SARS-COV-2 RNA NPH QL NAA+NON-PROBE: NOT DETECTED

## 2021-12-07 PROCEDURE — 90686 IIV4 VACC NO PRSV 0.5 ML IM: CPT | Performed by: HOSPITALIST

## 2021-12-07 PROCEDURE — G0378 HOSPITAL OBSERVATION PER HR: HCPCS

## 2021-12-07 PROCEDURE — 97166 OT EVAL MOD COMPLEX 45 MIN: CPT

## 2021-12-07 PROCEDURE — 63710000001 INSULIN LISPRO (HUMAN) PER 5 UNITS: Performed by: HOSPITALIST

## 2021-12-07 PROCEDURE — 63710000001 INSULIN REGULAR HUMAN PER 5 UNITS: Performed by: NURSE PRACTITIONER

## 2021-12-07 PROCEDURE — 0202U NFCT DS 22 TRGT SARS-COV-2: CPT | Performed by: INTERNAL MEDICINE

## 2021-12-07 PROCEDURE — 82962 GLUCOSE BLOOD TEST: CPT

## 2021-12-07 PROCEDURE — 25010000002 INFLUENZA VAC SPLIT QUAD 0.5 ML SUSPENSION PREFILLED SYRINGE: Performed by: HOSPITALIST

## 2021-12-07 PROCEDURE — G0008 ADMIN INFLUENZA VIRUS VAC: HCPCS | Performed by: HOSPITALIST

## 2021-12-07 PROCEDURE — 97535 SELF CARE MNGMENT TRAINING: CPT

## 2021-12-07 PROCEDURE — 97110 THERAPEUTIC EXERCISES: CPT

## 2021-12-07 RX ORDER — PREGABALIN 50 MG/1
50 CAPSULE ORAL EVERY 12 HOURS SCHEDULED
Qty: 6 CAPSULE | Refills: 0 | Status: SHIPPED | OUTPATIENT
Start: 2021-12-07 | End: 2022-01-28 | Stop reason: SDUPTHER

## 2021-12-07 RX ORDER — CLONIDINE HYDROCHLORIDE 0.1 MG/1
0.1 TABLET ORAL ONCE
Status: COMPLETED | OUTPATIENT
Start: 2021-12-07 | End: 2021-12-07

## 2021-12-07 RX ORDER — HYDROCODONE BITARTRATE AND ACETAMINOPHEN 5; 325 MG/1; MG/1
1 TABLET ORAL EVERY 6 HOURS PRN
Qty: 12 TABLET | Refills: 0 | Status: SHIPPED | OUTPATIENT
Start: 2021-12-07 | End: 2021-12-10

## 2021-12-07 RX ADMIN — PREGABALIN 50 MG: 50 CAPSULE ORAL at 08:48

## 2021-12-07 RX ADMIN — HYDROCODONE BITARTRATE AND ACETAMINOPHEN 1 TABLET: 10; 325 TABLET ORAL at 16:11

## 2021-12-07 RX ADMIN — INFLUENZA VIRUS VACCINE 0.5 ML: 15; 15; 15; 15 SUSPENSION INTRAMUSCULAR at 12:19

## 2021-12-07 RX ADMIN — PANTOPRAZOLE SODIUM 40 MG: 40 TABLET, DELAYED RELEASE ORAL at 06:08

## 2021-12-07 RX ADMIN — LISINOPRIL 5 MG: 5 TABLET ORAL at 08:48

## 2021-12-07 RX ADMIN — HYDROCODONE BITARTRATE AND ACETAMINOPHEN 1 TABLET: 10; 325 TABLET ORAL at 08:48

## 2021-12-07 RX ADMIN — INSULIN LISPRO 10 UNITS: 100 INJECTION, SOLUTION INTRAVENOUS; SUBCUTANEOUS at 08:48

## 2021-12-07 RX ADMIN — BISOPROLOL FUMARATE 5 MG: 5 TABLET, FILM COATED ORAL at 08:48

## 2021-12-07 RX ADMIN — INSULIN LISPRO 10 UNITS: 100 INJECTION, SOLUTION INTRAVENOUS; SUBCUTANEOUS at 12:19

## 2021-12-07 RX ADMIN — HYDROCODONE BITARTRATE AND ACETAMINOPHEN 1 TABLET: 10; 325 TABLET ORAL at 00:15

## 2021-12-07 RX ADMIN — HYDROCHLOROTHIAZIDE 12.5 MG: 12.5 CAPSULE ORAL at 08:49

## 2021-12-07 RX ADMIN — INSULIN HUMAN 8 UNITS: 100 INJECTION, SOLUTION PARENTERAL at 00:42

## 2021-12-07 RX ADMIN — INSULIN HUMAN 10 UNITS: 100 INJECTION, SOLUTION PARENTERAL at 06:08

## 2021-12-07 RX ADMIN — LEVOTHYROXINE SODIUM 112 MCG: 0.11 TABLET ORAL at 06:08

## 2021-12-07 RX ADMIN — CLONIDINE HYDROCHLORIDE 0.1 MG: 0.1 TABLET ORAL at 00:42

## 2021-12-07 NOTE — CASE MANAGEMENT/SOCIAL WORK
Continued Stay Note  [facility]     Patient Name: Halie Guidry  MRN: [id number]  Today's Date: 12/7/2021    Admit Date: 12/1/2021     Discharge Plan     Row Name 12/07/21 1140       Plan    Plan DC to accepting facility    Plan Comments CCP called Mame at [facility], [phone], left vm asking for update on precert.  CCP will continue to follow. Reji MeyerRN               Discharge Codes    No documentation.               Expected Discharge Date and Time     Expected Discharge Date Expected Discharge Time    Dec 3, 2021             Vashti Barrera

## 2021-12-07 NOTE — PLAN OF CARE
Goal Outcome Evaluation:  Plan of Care Reviewed With: patient        Progress: improving  Outcome Summary: Pt tolerated treatment well this date. Upon entering, nsg had pt up on BSC. Required min A to stand, w/ 2nd person CGA for safety. Ambulated 60ft x2 w/ Rw and CGA. Several cues given to keep walker close and to correct posture. Overall, much improved progress today.

## 2021-12-07 NOTE — CASE MANAGEMENT/SOCIAL WORK
Continued Stay Note  [facility]     Patient Name: Halie Guidry  MRN: [id number]  Today's Date: 12/7/2021    Admit Date: 12/1/2021     Discharge Plan     Row Name 12/07/21 1159       Plan    Plan DC to [facility] today 12/7/2021.    Plan Comments CCP called mame at [facility] and they have obtained authorization for rehab and her bed is ready today after 2pm.  CCP informed Nurse Beena and texted MD- .CCP called [facility] for WC transport and they are booked today.  CCP called [facility], spoke to Beena and they can pick pt up today at 4pm.  Informed updated pts nurse and MD.    . CCP will continue to follow. ThanksReji RN    Row Name 12/07/21 2890       Plan    Plan DC to accepting facility    Plan Comments CCP called Mame at [facility], [phone], left vm asking for update on precert.  CCP will continue to follow. Thanks, PAUL Mendoza               Discharge Codes    No documentation.               Expected Discharge Date and Time     Expected Discharge Date Expected Discharge Time    Dec 3, 2021             Vashti Barrera

## 2021-12-07 NOTE — DISCHARGE SUMMARY
Patient Name: Halie Guidry  : 1940  MRN: [id number]    Date of Admission: 2021  Date of Discharge:  2021  Primary Care Physician: Napoleon Mead MD      Chief Complaint:   Hyperglycemia      Discharge Diagnoses     Active Hospital Problems    Diagnosis  POA   • **Type II diabetes mellitus, uncontrolled (HCC) [E11.65]  Unknown   • Metabolic encephalopathy [G93.41]  Yes   • VIPUL (acute kidney injury) (HCC) [N17.9]  Unknown   • Type 2 diabetes mellitus, with long-term current use of insulin (HCC) [E11.9, Z79.4]  Not Applicable   • Hyperlipidemia [E78.5]  Yes   • Primary hypothyroidism [E03.9]  Yes   • Benign essential hypertension [I10]  Yes      Resolved Hospital Problems   No resolved problems to display.        Hospital Course       Ms. Guidry is a 81 y.o. former smoker with a history of type 2 diabetes mellitus, hypertension, hyperlipidemia, and hypothyroidism that presents to [facility] complaining of altered mental status and hyperglycemia.  She is a poor historian on exam, so history is partially obtained from her son at bedside.  He reports he noticed she was confused yesterday evening.  He states the last time she was confused, it was due to her sugar being high.  He states he called a friend over to check her blood sugar and the machine could not read the value because it was too high, so he brought her to the ED.  The patient reports generalized weakness for the past few days and abdominal discomfort.  She denies nausea, vomiting, chest pain, and shortness of breath.  On arrival to the ED, her blood glucose was 617.  She received IV insulin in the ED.    1. Acute metabolic encephalopathy has resolved, there is no evidence of any infection that was noted.  CT of the brain with no acute findings.  No focal deficits on exam.  2. Acute kidney injury, this has now resolved.    3. Hypothyroidism, on Synthroid.    4. Hypertension, will resume bisoprolol, HCTZ and  lisinopril.    5. Generalized weakness, PT/ OT did evaluate and felt patient will benefit from rehab placement and so she is being sent to rehab facility.  6. Constipation, this has now resolved.    Day of Discharge         Physical Exam:  Temp:  [97.5 °F (36.4 °C)-99.2 °F (37.3 °C)] 97.5 °F (36.4 °C)  Heart Rate:  [57-72] 57  Resp:  [17-18] 18  BP: (145-191)/(67-91) 145/91  Body mass index is 29.6 kg/m².  Physical Exam  Constitutional:       General: She is not in acute distress.     Appearance: Normal appearance. She is not toxic-appearing.   HENT:      Head: Normocephalic and atraumatic.   Cardiovascular:      Rate and Rhythm: Normal rate and regular rhythm.   Pulmonary:      Effort: Pulmonary effort is normal. No respiratory distress.      Breath sounds: Normal breath sounds. No wheezing or rhonchi.   Abdominal:      General: Bowel sounds are normal. There is no distension.      Palpations: Abdomen is soft.      Tenderness: There is no abdominal tenderness. There is no guarding or rebound.   Musculoskeletal:         General: No swelling.      Right lower leg: No edema.      Left lower leg: No edema.   Skin:     General: Skin is warm and dry.   Neurological:      General: No focal deficit present.      Mental Status: She is alert and oriented to person, place, and time.   Psychiatric:         Mood and Affect: Mood normal.         Behavior: Behavior normal.       Consultants     Consult Orders (all) (From admission, onward)     Start     Ordered    12/02/21 0900  Inpatient Nutrition Consult  Once        Provider:  (Not yet assigned)    12/02/21 0440    12/02/21 0900  Inpatient Diabetes Educator Consult  Once        Provider:  (Not yet assigned)    12/02/21 0440    12/01/21 2326  [facility] (on-call MD unless specified) Details  Once        Specialty:  Hospitalist  Provider:  (Not yet assigned)    12/01/21 2325              Procedures     * Surgery not found *      Imaging Results (All)     Procedure Component Value Units  Date/Time    CT Head Without Contrast [[id number]] Collected: 12/01/21 2349     Updated: 12/01/21 2357    Narrative:      CT HEAD WITHOUT CONTRAST     HISTORY: Hyperglycemia     COMPARISON: 12/15/2017     TECHNIQUE: Axial CT imaging was obtained through the brain. No IV  contrast was administered.     FINDINGS:  No acute intracranial hemorrhage is seen. There is diffuse atrophy.  There is periventricular and deep white matter microangiopathic change.  There is no midline shift or mass effect. 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 No calvarial fracture is  seen. There is some mucosal thickening seen within the ethmoid sinuses.  Mastoid air cells appear clear.       Impression:      No acute intracranial findings.     Radiation dose reduction techniques were utilized, including automated  exposure control and exposure modulation based on body size.     This report was finalized on 12/1/2021 11:53 PM by Dr. Kristina Forman M.D.       XR Chest 1 View [[id number]] Collected: 12/01/21 2256     Updated: 12/01/21 2300    Narrative:      SINGLE VIEW OF THE CHEST     HISTORY: Altered mental status     COMPARISON: Similar 15 2017     FINDINGS:  Heart size is within normal limits for technique. No pneumothorax or  pleural effusion is seen. There is some calcification of the aorta.  Patient does appear to have some mild diffuse interstitial prominence,  which could reflect some vascular congestion. There is some bibasilar  atelectasis.       Impression:      Mild diffuse interstitial prominence may reflect some vascular  congestion.     This report was finalized on 12/1/2021 10:57 PM by Dr. Kristina Forman M.D.           Results for orders placed during the hospital encounter of 12/01/21    Duplex Venous Lower Extremity - Bilateral CAR    Interpretation Summary  · Normal bilateral lower extremity venous duplex scan.    Results for orders placed during the hospital encounter of  12/01/21    Adult Transthoracic Echo Complete W/ Cont if Necessary Per Protocol    Interpretation Summary  · Left ventricular ejection fraction appears to be 66 - 70%. Left ventricular systolic function is normal.  · Left ventricular wall thickness is consistent with mild to moderate concentric hypertrophy.  · Left ventricular diastolic function is consistent with (grade I) impaired relaxation.  · Normal right ventricular cavity size and systolic function noted.  · The left atrial cavity is mild to moderately dilated.  · There is a trivial pericardial effusion.    Pertinent Labs     Results from last 7 days   Lab Units 12/06/21  0504 12/03/21  0701 12/02/21  0535 12/01/21  2222   WBC 10*3/mm3 10.45 9.83 12.96* 12.85*   HEMOGLOBIN g/dL 12.5 13.1 12.7 14.3   PLATELETS 10*3/mm3 306 307 320 319     Results from last 7 days   Lab Units 12/06/21  0504 12/05/21  0759 12/03/21  1042 12/02/21  0535   SODIUM mmol/L 140 140 139 130*   POTASSIUM mmol/L 3.8 3.6 3.5 3.6   CHLORIDE mmol/L 104 104 104 94*   CO2 mmol/L 24.0 25.5 22.2 23.9   BUN mg/dL 23 19 19 25*   CREATININE mg/dL 1.40* 1.18* 1.07* 1.47*   GLUCOSE mg/dL 91 109* 126* 317*   EGFR IF NONAFRICN AM mL/min/1.73 36* 44* 49* 34*     Results from last 7 days   Lab Units 12/01/21  2222   ALBUMIN g/dL 4.50   BILIRUBIN mg/dL 0.7   ALK PHOS U/L 128*   AST (SGOT) U/L 14   ALT (SGPT) U/L 14     Results from last 7 days   Lab Units 12/06/21  0504 12/05/21  0759 12/03/21  1042 12/02/21  0535 12/01/21  2222 12/01/21  2222   CALCIUM mg/dL 8.8 9.3 9.0 9.3   < > 9.5   ALBUMIN g/dL  --   --   --   --   --  4.50    < > = values in this interval not displayed.       Results from last 7 days   Lab Units 12/01/21  2222   TROPONIN T ng/mL <0.010           Invalid input(s): LDLCALC      Results from last 7 days   Lab Units 12/07/21  1131   COVID19  Not Detected       Test Results Pending at Discharge       Discharge Details        Discharge Medications      New Medications      Instructions  Start Date   HYDROcodone-acetaminophen 5-325 MG per tablet  Commonly known as: Norco  Replaces: HYDROcodone-acetaminophen  MG per tablet   1 tablet, Oral, Every 6 Hours PRN         Changes to Medications      Instructions Start Date   Insulin Lispro (1 Unit Dial) 100 UNIT/ML solution pen-injector  Commonly known as: HUMALOG  What changed:   · how much to take  · how to take this  · when to take this  · additional instructions   10 Units, Subcutaneous, 3 Times Daily With Meals      pregabalin 50 MG capsule  Commonly known as: LYRICA  What changed: when to take this   50 mg, Oral, Every 12 Hours Scheduled      Toujeo SoloStar 300 UNIT/ML solution pen-injector injection  Generic drug: Insulin Glargine (1 Unit Dial)  What changed: Another medication with the same name was removed. Continue taking this medication, and follow the directions you see here.   INJECT 60 UNITS UNDER THE SKIN INTO THE APPROPRIATE AREA AS DIRECTED EVERY NIGHT AT BEDTIME         Continue These Medications      Instructions Start Date   atorvastatin 80 MG tablet  Commonly known as: LIPITOR   TAKE 1 TABLET EVERY NIGHT      bisoprolol 5 MG tablet  Commonly known as: ZEBeta   5 mg, Oral, Daily      hydroCHLOROthiazide 12.5 MG tablet  Commonly known as: HYDRODIURIL   12.5 mg, Oral, Daily      Insulin Pen Needle 32G X 4 MM misc   Use to inject insulin 4 TIMES DAILY      lansoprazole 30 MG capsule  Commonly known as: PREVACID   30 mg, Oral, Daily      levothyroxine 112 MCG tablet  Commonly known as: SYNTHROID, LEVOTHROID   112 mcg, Oral, Daily      lisinopril 5 MG tablet  Commonly known as: PRINIVIL,ZESTRIL   5 mg, Oral, Daily      Ozempic (0.25 or 0.5 MG/DOSE) 2 MG/1.5ML solution pen-injector  Generic drug: Semaglutide(0.25 or 0.5MG/DOS)   0.5 mg, Subcutaneous, Weekly, Stop victoza         Stop These Medications    HYDROcodone-acetaminophen  MG per tablet  Commonly known as: NORCO  Replaced by: HYDROcodone-acetaminophen 5-325 MG per  tablet            Allergies   Allergen Reactions   • Sulfa Antibiotics        Discharge Disposition:  Skilled Nursing Facility (DC - External)      Discharge Diet:  Diet Order   Procedures   • Diet Regular; Consistent Carbohydrate       Discharge Activity:   Activity Instructions     Activity as Tolerated            CODE STATUS:    Code Status and Medical Interventions:   Ordered at: 12/02/21 0142     Code Status (Patient has no pulse and is not breathing):    CPR (Attempt to Resuscitate)     Medical Interventions (Patient has pulse or is breathing):    Full Support       Future Appointments   Date Time Provider Department Center   2/18/2022  2:15 PM Solomon Barrow MD [facility]     Additional Instructions for the Follow-ups that You Need to Schedule     Call MD for problems / concerns.   As directed         Follow-up Information     Napoleon Mead MD .    Specialty: Family Medicine  Contact information:  [address] [address] [address] [phone]             Solomon Barrow MD Follow up.    Specialty: Endocrinology  Contact information:  [address] [address] [phone]                         Additional Instructions for the Follow-ups that You Need to Schedule     Call MD for problems / concerns.   As directed        Time Spent on Discharge:  Greater than 30 minutes      Kelechi Sanchez MD  [facility]  12/07/21  15:17 EST

## 2021-12-07 NOTE — PLAN OF CARE
Goal Outcome Evaluation:  Plan of Care Reviewed With: patient           Outcome Summary: BP elevated and received an order for Clonidine .1mg x 1.  BP was 183/79.  Pain pill given.  Will recheck BP soon.  Insuling given as ordered.  NSR on monitor.  Will cont to monitor.  /80 one hour after Clonidine given.

## 2021-12-07 NOTE — PLAN OF CARE
Goal Outcome Evaluation:  Plan of Care Reviewed With: patient        Progress: no change  Outcome Summary: Pt is an 82 yo female seen for OT evaluation this AM. Pt admitted with blood glucose well over 500, shaking, confusion and general weakness. Patient reports she lives with her son in a condo, (I) at prior leve with ADLs, son assist with various IADLs, and used walker at baseline. Patient states son works during the day and not able to assist 24/7. Patient presents this date with impaired ADLs, impaired func transfers, strength, balance and functional endurance. Patient on BSC upon entry. Requires max A for posterior marquez care/hygiene and max A for LB dressing. STS at min A x1 from BSC, able to progress to CGA for increased distances, demonstrating ability to tolerate househould distances with use of Rwx. Vc's required for posture and safe use of Rwx. Patient would continue to benefit from skilled OT during acute stay to address func deficits and recommending SNF at d/c before returning home with her son.    Therapist in mask, gloves, glasses and hand hygiene performed.

## 2021-12-07 NOTE — THERAPY EVALUATION
Patient Name: Halie Guidry  : 1940    MRN: [id number]                              Today's Date: 2021       Admit Date: 2021    Visit Dx:     ICD-10-CM ICD-9-CM   1. Metabolic encephalopathy  G93.41 348.31   2. Hyperglycemia  R73.9 790.29   3. VIPUL (acute kidney injury) ([facility])  N17.9 584.9   4. Type 2 diabetes mellitus with other circulatory complication, with long-term current use of insulin ([facility])  E11.59 250.70    Z79.4 V58.67     Patient Active Problem List   Diagnosis   • Compression fracture   • Lumbar degenerative disc disease   • Type 2 diabetes mellitus, with long-term current use of insulin ([facility])   • Hyperlipidemia   • Benign essential hypertension   • Primary hypothyroidism   • Leukocytosis   • Neuropathy   • Osteoporosis   • Proteinuria   • Tobacco abuse   • Diabetic eye exam ([facility])   • Generalized weakness   • Spinal stenosis   • Scoliosis   • Arthritis   • VBI (vertebrobasilar insufficiency)   • Orthostatic hypotension   • 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 ([facility])   • Severe hypothyroidism   • Noncompliance   • Vitamin D deficiency disease   • Altered mental status   • Tremor   • Seizure ([facility])   • Chronic kidney disease, stage 2 (mild)   • Thoracic degenerative disc disease   • Metabolic encephalopathy   • VIPUL (acute kidney injury) ([facility])   • Hyperglycemia   • Type II diabetes mellitus, uncontrolled ([facility])     Past Medical History:   Diagnosis Date   • Anxiety    • Arthritis    • Benign essential hypertension 2014   • 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[facility])    • Depression    • Diabetes ([facility])    • Diabetes mellitus, type 2 ([facility])    • Disc degeneration, lumbar    • Headache, tension-type    • Hyperlipidemia    • Hypothyroidism    • Neuropathy    • Osteoporosis 2015   • Peripheral neuropathy    • Rotator cuff tear, left    • Scoliosis    • Shoulder pain     LEFT, TORN ROTATOR CUFF S/P FALL   • Spinal stenosis      Past Surgical History:   Procedure Laterality Date   •  APPENDECTOMY     • BILATERAL BREAST REDUCTION Bilateral 08/2015   • CATARACT EXTRACTION  03/2015   • COLONOSCOPY  06/05/2015    WNL   • KYPHOPLASTY     • REDUCTION MAMMAPLASTY     • TONSILLECTOMY        General Information     Row Name 12/07/21 1216          OT Time and Intention    Document Type evaluation  -MW     Mode of Treatment occupational therapy; physical therapy; co-treatment  -MW     Row Name 12/07/21 1216          General Information    Patient Profile Reviewed yes  -MW     Prior Level of Function independent:; ADL's; all household mobility  -MW     Existing Precautions/Restrictions fall  -MW     Barriers to Rehab medically complex; previous functional deficit  -MW     Row Name 12/07/21 1216          Living Environment    Lives With child(beatriz), adult  -MW     Row Name 12/07/21 1216          Cognition    Orientation Status (Cognition) oriented x 3  -MW     Row Name 12/07/21 1216          Safety Issues, Functional Mobility    Safety Issues Affecting Function (Mobility) safety precaution awareness; safety precautions follow-through/compliance  -MW     Impairments Affecting Function (Mobility) balance; endurance/activity tolerance; strength  -     Comment, Safety Issues/Impairments (Mobility) non skid socks and gait belt donned  -           User Key  (r) = Recorded By, (t) = Taken By, (c) = Cosigned By    Initials Name Provider Type    MW Arcelia Valentin, OT Occupational Therapist                 Mobility/ADL's     Row Name 12/07/21 1217          Bed Mobility    Comment (Bed Mobility) Pt up on [facility] with nurse aide upon arrival, not tested this date  -     Row Name 12/07/21 1217          Transfers    Transfers sit-stand transfer; toilet transfer  -     Comment (Transfers) STS with min A x1 from BSC  -     Sit-Stand [place] (Transfers) 1 person assist; minimum assist (75% patient effort); verbal cues  -     [place] Level (Toilet Transfer) minimum assist (75% patient effort); 1 person  assist  -     Assistive Device (Toilet Transfer) commode, bedside without drop arms; walker, 4-wheeled; grab bars/safety frame  -     Row Name 12/07/21 1217          Sit-Stand Transfer    Assistive Device (Sit-Stand Transfers) walker, front-wheeled  -     Row Name 12/07/21 1217          Toilet Transfer    Type (Toilet Transfer) sit-stand; stand-sit  -     Row Name 12/07/21 1217          Functional Mobility    Functional Mobility- Ind. Level contact guard assist  -     Functional Mobility- Device rolling walker  -     Functional Mobility- Comment Pt completed steps within room, able to navigate obstacles and demo household mobility within room and out in hallway for increased distance with CGA and use of Rwx, standing RBs required and cues for posture  -     Row Name 12/07/21 1217          Activities of Daily Living    BADL Assessment/Intervention lower body dressing; toileting  -     Row Name 12/07/21 1217          Lower Body Dressing Assessment/Training    [place] Level (Lower Body Dressing) lower body dressing skills; don; pants/bottoms; maximum assist (25% patient effort)  -     Position (Lower Body Dressing) supported standing  -     Row Name 12/07/21 1217          Toileting Assessment/Training    [place] Level (Toileting) toileting skills; adjust/manage clothing; change pad/brief; maximum assist (25% patient effort)  -     Assistive Devices (Toileting) commode, bedside without drop arms; grab bar/safety frame  -     Position (Toileting) supported standing  -     Comment (Toileting) able to maintain static stand upright for increased time with max A required to complete toileting hygine and donning new brief  -           User Key  (r) = Recorded By, (t) = Taken By, (c) = Cosigned By    Initials Name Provider Type    MW Arcelia Valentin OT Occupational Therapist               Obj/Interventions     Row Name 12/07/21 1220          Sensory Assessment (Somatosensory)    Sensory  Assessment (Somatosensory) UE sensation intact  -     Row Name 12/07/21 1220          Vision Assessment/Intervention    Visual Impairment/Limitations WFL  -     Row Name 12/07/21 1220          Range of Motion Comprehensive    General Range of Motion no range of motion deficits identified  -     Row Name 12/07/21 1220          Strength Comprehensive (MMT)    General Manual Muscle Testing (MMT) Assessment upper extremity strength deficits identified  -     Comment, General Manual Muscle Testing (MMT) Assessment BUE grossly 4-/5 in functional planes  -     Row Name 12/07/21 1220          Balance    Balance Assessment sitting static balance; sitting dynamic balance; sit to stand dynamic balance; standing dynamic balance; standing static balance  -     Static Sitting Balance WFL; sitting in chair  -     Dynamic Sitting Balance WFL; sitting in chair  -     Sit to Stand Dynamic Balance mild impairment; supported; standing  -     Static Standing Balance mild impairment; supported; standing  -     Dynamic Standing Balance mild impairment; supported; standing  -     Balance Interventions sitting; standing; sit to stand; supported; dynamic; minimal challenge; static; occupation based/functional task  -     Comment, Balance min A for STS progressing to CGA for mobility, no LOBs, cues for proper distance between self and walker.  -           User Key  (r) = Recorded By, (t) = Taken By, (c) = Cosigned By    Initials Name Provider Type    MW Arcelia Valentin OT Occupational Therapist               Goals/Plan     Row Name 12/07/21 1224          Transfer Goal 1 (OT)    Activity/Assistive Device (Transfer Goal 1, OT) transfers, all  -     [place] Level/Cues Needed (Transfer Goal 1, OT) standby assist  -     Time Frame (Transfer Goal 1, OT) short term goal (STG); 2 weeks  -     Progress/Outcome (Transfer Goal 1, OT) goal ongoing  -     Row Name 12/07/21 1224          Dressing Goal 1 (OT)     Activity/Device (Dressing Goal 1, OT) dressing skills, all  -MW     [place]/Cues Needed (Dressing Goal 1, OT) minimum assist (75% or more patient effort)  -MW     Time Frame (Dressing Goal 1, OT) short term goal (STG); 2 weeks  -MW     Progress/Outcome (Dressing Goal 1, OT) goal ongoing  -MW     Row Name 12/07/21 1224          Toileting Goal 1 (OT)    Activity/Device (Toileting Goal 1, OT) toileting skills, all  -MW     [place] Level/Cues Needed (Toileting Goal 1, OT) minimum assist (75% or more patient effort)  -MW     Time Frame (Toileting Goal 1, OT) short term goal (STG); 2 weeks  -MW     Progress/Outcome (Toileting Goal 1, OT) goal ongoing  -MW     Row Name 12/07/21 1224          Grooming Goal 1 (OT)    Activity/Device (Grooming Goal 1, OT) grooming skills, all  -MW     [place] (Grooming Goal 1, OT) contact guard assist  -MW     Time Frame (Grooming Goal 1, OT) short term goal (STG); 2 weeks  -MW     Progress/Outcome (Grooming Goal 1, OT) goal ongoing  -     Row Name 12/07/21 1224          Therapy Assessment/Plan (OT)    Planned Therapy Interventions (OT) activity tolerance training; functional balance retraining; BADL retraining; neuromuscular control/coordination retraining; occupation/activity based interventions; patient/caregiver education/training; ROM/therapeutic exercise; strengthening exercise; transfer/mobility retraining  -MW           User Key  (r) = Recorded By, (t) = Taken By, (c) = Cosigned By    Initials Name Provider Type    Arcelia Wills OT Occupational Therapist               Clinical Impression     Row Name 12/07/21 1221          Pain Assessment    Additional Documentation Pain Scale: Numbers Pre/Post-Treatment (Group)  -     Row Name 12/07/21 1221          Pain Scale: Numbers Pre/Post-Treatment    Pretreatment Pain Rating 0/10 - no pain  -MW     Posttreatment Pain Rating 0/10 - no pain  -     Row Name 12/07/21 1221          Plan of Care Review    Plan of Care  Reviewed With patient  -MW     Progress no change  -MW     Outcome Summary Pt is an 80 yo female seen for OT evaluation this AM. Pt admitted with blood glucose well over 500, shaking, confusion and general weakness. Patient reports she lives with her son in a condo, (I) at prior leve with ADLs, son assist with various IADLs, and used walker at baseline. Patient states son works during the day and not able to assist 24/7. Patient presents this date with impaired ADLs, impaired func transfers, strength, balance and functional endurance. Patient on BSC upon entry. Requires max A for posterior marquez care/hygiene and max A for LB dressing. STS at min A x1 from BSC, able to progress to CGA for increased distances, demonstrating ability to tolerate househould distances with use of Rwx. Vc's required for posture and safe use of Rwx. Patient would continue to benefit from skilled OT during acute stay to address func deficits and recommending SNF at d/c before returning home with her son.  -     Row Name 12/07/21 1221          Therapy Assessment/Plan (OT)    Rehab Potential (OT) good, to achieve stated therapy goals  -     Criteria for Skilled Therapeutic Interventions Met (OT) yes; meets criteria; skilled treatment is necessary  -MW     Therapy Frequency (OT) 5 times/wk  -MW     Row Name 12/07/21 1221          Therapy Plan Review/Discharge Plan (OT)    Anticipated Discharge Disposition (OT) skilled nursing facility  -     Row Name 12/07/21 1221          Vital Signs    O2 Delivery Pre Treatment room air  -MW     O2 Delivery Intra Treatment room air  -MW     O2 Delivery Post Treatment room air  -MW     Pre Patient Position Sitting  -MW     Intra Patient Position Standing  -MW     Post Patient Position Sitting  -MW     Row Name 12/07/21 1221          Positioning and Restraints    Pre-Treatment Position bedside commode  -MW     Post Treatment Position chair  -MW     In Chair notified nsg; reclined; call light within reach;  encouraged to call for assist; exit alarm on  -           User Key  (r) = Recorded By, (t) = Taken By, (c) = Cosigned By    Initials Name Provider Type    Arcelia Wills OT Occupational Therapist               Outcome Measures     Row Name 12/07/21 1225          How much help from another is currently needed...    Putting on and taking off regular lower body clothing? 2  -MW     Bathing (including washing, rinsing, and drying) 2  -MW     Toileting (which includes using toilet bed pan or urinal) 2  -MW     Putting on and taking off regular upper body clothing 3  -MW     Taking care of personal grooming (such as brushing teeth) 3  -MW     Eating meals 3  -MW     AM-PAC 6 Clicks Score (OT) 15  -MW     Row Name 12/07/21 1225          Modified Geno Scale    Modified Geno Scale 4 - Moderately severe disability.  Unable to walk without assistance, and unable to attend to own bodily needs without assistance.  -     Row Name 12/07/21 1225          Functional Assessment    Outcome Measure Options AM-PAC 6 Clicks Daily Activity (OT); Modified [place]  -           User Key  (r) = Recorded By, (t) = Taken By, (c) = Cosigned By    Initials Name Provider Type    Arcelia Wills OT Occupational Therapist                Occupational Therapy Education                 Title: PT OT SLP Therapies (Done)     Topic: Occupational Therapy (Done)     Point: ADL training (Done)     Description:   Instruct learner(s) on proper safety adaptation and remediation techniques during self care or transfers.   Instruct in proper use of assistive devices.              Learning Progress Summary           Patient Acceptance, E, VU by JENNIFER at 12/7/2021 1225    Comment: role of OT, d/c rec, therapy goals                   Point: Home exercise program (Done)     Description:   Instruct learner(s) on appropriate technique for monitoring, assisting and/or progressing therapeutic exercises/activities.              Learning Progress Summary            Patient Acceptance, E, VU by  at 12/7/2021 1225    Comment: role of OT, d/c rec, therapy goals                   Point: Precautions (Done)     Description:   Instruct learner(s) on prescribed precautions during self-care and functional transfers.              Learning Progress Summary           Patient Acceptance, E, VU by  at 12/7/2021 1225    Comment: role of OT, d/c rec, therapy goals                   Point: Body mechanics (Done)     Description:   Instruct learner(s) on proper positioning and spine alignment during self-care, functional mobility activities and/or exercises.              Learning Progress Summary           Patient Acceptance, E, VU by  at 12/7/2021 1225    Comment: role of OT, d/c rec, therapy goals                               User Key     Initials Effective Dates Name Provider Type Discipline     08/20/21 -  Arcelia Valentin OT Occupational Therapist OT              OT Recommendation and Plan  Planned Therapy Interventions (OT): activity tolerance training, functional balance retraining, BADL retraining, neuromuscular control/coordination retraining, occupation/activity based interventions, patient/caregiver education/training, ROM/therapeutic exercise, strengthening exercise, transfer/mobility retraining  Therapy Frequency (OT): 5 times/wk  Plan of Care Review  Plan of Care Reviewed With: patient  Progress: no change  Outcome Summary: Pt is an 80 yo female seen for OT evaluation this AM. Pt admitted with blood glucose well over 500, shaking, confusion and general weakness. Patient reports she lives with her son in a condo, (I) at [facility] with ADLs, son assist with various IADLs, and used walker at baseline. Patient states son works during the day and not able to assist 24/7. Patient presents this date with impaired ADLs, impaired func transfers, strength, balance and functional endurance. Patient on BSC upon entry. Requires max A for posterior marquez care/hygiene and max  A for LB dressing. STS at min A x1 from BSC, able to progress to CGA for increased distances, demonstrating ability to tolerate househould distances with use of Rwx. Vc's required for posture and safe use of Rwx. Patient would continue to benefit from skilled OT during acute stay to address func deficits and recommending SNF at d/c before returning home with her son.     Time Calculation:    Time Calculation- OT     Row Name 12/07/21 1226             Time Calculation- OT    OT Start Time 1035  -MW      OT Stop Time 1101  -MW      OT Time Calculation (min) 26 min  -MW      Total Timed Code Minutes- OT 18 minute(s)  -MW      OT Received On 12/07/21  -MW      OT - Next Appointment 12/08/21  -MW      OT Goal Re-Cert Due Date 12/21/21  -MW              Timed Charges    66380 - OT Self Care/Mgmt Minutes 18  -MW              Untimed Charges    OT Eval/Re-eval Minutes 8  -MW              Total Minutes    Timed Charges Total Minutes 18  -MW      Untimed Charges Total Minutes 8  -MW       Total Minutes 26  -MW            User Key  (r) = Recorded By, (t) = Taken By, (c) = Cosigned By    Initials Name Provider Type     Arcelia Valentin OT Occupational Therapist              Therapy Charges for Today     Code Description Service Date Service Provider Modifiers Qty    [id number]  OT SELF CARE/MGMT/TRAIN EA 15 MIN 12/7/2021 Arcelia Valentin OT GO 1    [id number] HC OT EVAL MOD COMPLEXITY 2 12/7/2021 Arcelia Valentin OT GO 1               Arcelia Valentin OT  12/7/2021

## 2021-12-07 NOTE — THERAPY TREATMENT NOTE
Patient Name: Halie Guidry  : 1940    MRN: [id number]                              Today's Date: 2021       Admit Date: 2021    Visit Dx:     ICD-10-CM ICD-9-CM   1. Metabolic encephalopathy  G93.41 348.31   2. Hyperglycemia  R73.9 790.29   3. VIPUL (acute kidney injury) ([facility])  N17.9 584.9   4. Type 2 diabetes mellitus with other circulatory complication, with long-term current use of insulin ([facility])  E11.59 250.70    Z79.4 V58.67     Patient Active Problem List   Diagnosis   • Compression fracture   • Lumbar degenerative disc disease   • Type 2 diabetes mellitus, with long-term current use of insulin ([facility])   • Hyperlipidemia   • Benign essential hypertension   • Primary hypothyroidism   • Leukocytosis   • Neuropathy   • Osteoporosis   • Proteinuria   • Tobacco abuse   • Diabetic eye exam ([facility])   • Generalized weakness   • Spinal stenosis   • Scoliosis   • Arthritis   • VBI (vertebrobasilar insufficiency)   • Orthostatic hypotension   • 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 ([facility])   • Severe hypothyroidism   • Noncompliance   • Vitamin D deficiency disease   • Altered mental status   • Tremor   • Seizure ([facility])   • Chronic kidney disease, stage 2 (mild)   • Thoracic degenerative disc disease   • Metabolic encephalopathy   • VIPUL (acute kidney injury) ([facility])   • Hyperglycemia   • Type II diabetes mellitus, uncontrolled ([facility])     Past Medical History:   Diagnosis Date   • Anxiety    • Arthritis    • Benign essential hypertension 2014   • 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[facility])    • Depression    • Diabetes ([facility])    • Diabetes mellitus, type 2 ([facility])    • Disc degeneration, lumbar    • Headache, tension-type    • Hyperlipidemia    • Hypothyroidism    • Neuropathy    • Osteoporosis 2015   • Peripheral neuropathy    • Rotator cuff tear, left    • Scoliosis    • Shoulder pain     LEFT, TORN ROTATOR CUFF S/P FALL   • Spinal stenosis      Past Surgical History:   Procedure Laterality Date   •  APPENDECTOMY     • BILATERAL BREAST REDUCTION Bilateral 08/2015   • CATARACT EXTRACTION  03/2015   • COLONOSCOPY  06/05/2015    WNL   • KYPHOPLASTY     • REDUCTION MAMMAPLASTY     • TONSILLECTOMY        General Information     Row Name 12/07/21 1249          Physical Therapy Time and Intention    Document Type therapy note (daily note)  -     Mode of Treatment physical therapy  -     Row Name 12/07/21 1249          General Information    Existing Precautions/Restrictions fall  -     Row Name 12/07/21 1249          Cognition    Orientation Status (Cognition) oriented x 3  -           User Key  (r) = Recorded By, (t) = Taken By, (c) = Cosigned By    Initials Name Provider Type     Savita Ray PTA Physical Therapy Assistant               Mobility     Row Name 12/07/21 1249          Bed Mobility    Comment (Bed Mobility) up on BSC, then up in chair  -     Row Name 12/07/21 1249          Sit-Stand Transfer    Sit-Stand [place] (Transfers) minimum assist (75% patient effort)  -     Assistive Device (Sit-Stand Transfers) walker, front-wheeled  -     Row Name 12/07/21 1249          Gait/Stairs (Locomotion)    [place] Level (Gait) contact guard  -     Assistive Device (Gait) walker, front-wheeled  -     Distance in Feet (Gait) 60ft x2  -     Deviations/Abnormal Patterns (Gait) antalgic; tess decreased; stride length decreased  -     Bilateral Gait Deviations forward flexed posture  -     Comment (Gait/Stairs) cues for staying close to walker and to correct posture  -           User Key  (r) = Recorded By, (t) = Taken By, (c) = Cosigned By    Initials Name Provider Type     Savita Ray PTA Physical Therapy Assistant               Obj/Interventions     Row Name 12/07/21 1252          Motor Skills    Therapeutic Exercise --  seated AP, QS, GS x10 reps  -           User Key  (r) = Recorded By, (t) = Taken By, (c) = Cosigned By    Initials Name Provider Type      Savita Ray PTA Physical Therapy Assistant               Goals/Plan    No documentation.                Clinical Impression     Row Name 12/07/21 1252          Pain    Additional Documentation Pain Scale: Numbers Pre/Post-Treatment (Group)  -     Row Name 12/07/21 1252          Pain Scale: Numbers Pre/Post-Treatment    Pretreatment Pain Rating 0/10 - no pain  -SM     Posttreatment Pain Rating 0/10 - no pain  -SM     Row Name 12/07/21 1252          Positioning and Restraints    Pre-Treatment Position bedside commode  -     Post Treatment Position chair  -SM     In Chair reclined; call light within reach; encouraged to call for assist; exit alarm on  -           User Key  (r) = Recorded By, (t) = Taken By, (c) = Cosigned By    Initials Name Provider Type    Savita Leonard PTA Physical Therapy Assistant               Outcome Measures     Row Name 12/07/21 1254          How much help from another person do you currently need...    Turning from your back to your side while in flat bed without using bedrails? 3  -SM     Moving from lying on back to sitting on the side of a flat bed without bedrails? 2  -SM     Moving to and from a bed to a chair (including a wheelchair)? 3  -SM     Standing up from a chair using your arms (e.g., wheelchair, bedside chair)? 3  -SM     Climbing 3-5 steps with a railing? 2  -SM     To walk in hospital room? 3  -SM     AM-PAC 6 Clicks Score (PT) 16  -SM     Row Name 12/07/21 1225          Modified Beckham Scale    Modified Geno Scale 4 - Moderately severe disability.  Unable to walk without assistance, and unable to attend to own bodily needs without assistance.  -MW     Row Name 12/07/21 1254 12/07/21 1225       Functional Assessment    Outcome Measure Options AM-PAC 6 Clicks Basic Mobility (PT)  - AM-PAC 6 Clicks Daily Activity (OT); Modified Geno  -MW          User Key  (r) = Recorded By, (t) = Taken By, (c) = Cosigned By    Initials Name Provider Type    JONNA  Savita Ray PTA Physical Therapy Assistant    Arcelia Wills OT Occupational Therapist                             Physical Therapy Education                 Title: PT OT SLP Therapies (Done)     Topic: Physical Therapy (Done)     Point: Mobility training (Done)     Learning Progress Summary           Patient Acceptance, E,TB,D, VU,NR by  at 12/7/2021 1254    Acceptance, E,TB, VU by RW at 12/7/2021 0112    Acceptance, E,TB,D, VU,NR by JOSE ALBERTO at 12/6/2021 1224    Acceptance, E,TB, VU by RW at 12/4/2021 1315    Acceptance, E, VU,NR by  at 12/3/2021 1529                   Point: Home exercise program (Done)     Learning Progress Summary           Patient Acceptance, E,TB,D, VU,NR by  at 12/7/2021 1254    Acceptance, E,TB, VU by RW at 12/7/2021 0112    Acceptance, E,TB,D, VU,NR by  at 12/6/2021 1224    Acceptance, E,TB, VU by RW at 12/4/2021 1315    Acceptance, E, VU,NR by  at 12/3/2021 1529                   Point: Body mechanics (Done)     Learning Progress Summary           Patient Acceptance, E,TB,D, VU,NR by  at 12/7/2021 1254    Acceptance, E,TB, VU by RW at 12/7/2021 0112    Acceptance, E,TB,D, VU,NR by  at 12/6/2021 1224    Acceptance, E,TB, VU by RW at 12/4/2021 1315    Acceptance, E, VU,NR by  at 12/3/2021 1529                   Point: Precautions (Done)     Learning Progress Summary           Patient Acceptance, E,TB,D, VU,NR by  at 12/7/2021 1254    Acceptance, E,TB, VU by RW at 12/7/2021 0112    Acceptance, E,TB,D, VU,NR by  at 12/6/2021 1224    Acceptance, E,TB, VU by RW at 12/4/2021 1315    Acceptance, E, VU,NR by  at 12/3/2021 1529                               User Key     Initials Effective Dates Name Provider Type Discipline    JOSE ALBERTO 03/07/18 -  Beena Ray PTA Physical Therapy Assistant PT    RW 06/16/21 -  Maksim Daniels, RN Registered Nurse Nurse     03/07/18 -  Savita Ray, LOKI Physical Therapy Assistant PT    CF 06/16/21 -  Hetal Velasco PT  Physical Therapist PT              PT Recommendation and Plan     Plan of Care Reviewed With: patient  Progress: improving  Outcome Summary: Pt tolerated treatment well this date. Upon entering, nsg had pt up on BSC. Required min A to stand, w/ 2nd person CGA for safety. Ambulated 60ft x2 w/ Rw and CGA. Several cues given to keep walker close and to correct posture. Overall, much improved progress today.     Time Calculation:    PT Charges     Row Name 12/07/21 1258             Time Calculation    Start Time 1036  -      Stop Time 1101  -      Time Calculation (min) 25 min  -      PT Received On 12/07/21  -      PT - Next Appointment 12/08/21  -            User Key  (r) = Recorded By, (t) = Taken By, (c) = Cosigned By    Initials Name Provider Type    Savita Leonard PTA Physical Therapy Assistant              Therapy Charges for Today     Code Description Service Date Service Provider Modifiers Qty    79447379489 HC PT THER PROC EA 15 MIN 12/7/2021 Savita Ray PTA GP 2    10532472320 HC PT THER SUPP EA 15 MIN 12/7/2021 Savita Ray PTA GP 1          PT G-Codes  Outcome Measure Options: AM-PAC 6 Clicks Basic Mobility (PT)  AM-PAC 6 Clicks Score (PT): 16  AM-PAC 6 Clicks Score (OT): 15  Modified [place] Scale: 4 - Moderately severe disability.  Unable to walk without assistance, and unable to attend to own bodily needs without assistance.    Savita Ray PTA  12/7/2021

## 2021-12-17 ENCOUNTER — READMISSION MANAGEMENT (OUTPATIENT)
Dept: CALL CENTER | Facility: HOSPITAL | Age: 81
End: 2021-12-17

## 2021-12-18 NOTE — OUTREACH NOTE
Prep Survey      Responses   [facility] facility patient discharged from? Non-BH   Is LACE score < 7 ? Non-BH Discharge   Emergency Room discharge w/ pulse ox? No   Eligibility [facility]   Date of Admission 12/07/21   Date of Discharge 12/17/21   Discharge diagnosis Type II diabetes mellitus, uncontrolled, Metabolic encephalopathy   Does the patient have one of the following disease processes/diagnoses(primary or secondary)? Other   Prep survey completed? Yes          Elvia Martinez RN

## 2021-12-20 ENCOUNTER — TRANSITIONAL CARE MANAGEMENT TELEPHONE ENCOUNTER (OUTPATIENT)
Dept: CALL CENTER | Facility: HOSPITAL | Age: 81
End: 2021-12-20

## 2021-12-20 NOTE — OUTREACH NOTE
Call Center TCM Note      Responses   [facility] patient discharged from? Non-   Does the patient have one of the following disease processes/diagnoses(primary or secondary)? Other   TCM attempt successful? No   Unsuccessful attempts Attempt 1   Call Status Left message          Kelsey Victor MA    12/20/2021, 13:09 EST

## 2021-12-20 NOTE — OUTREACH NOTE
Call Center TCM Note      Responses   [facility] patient discharged from? Non-BH   Does the patient have one of the following disease processes/diagnoses(primary or secondary)? Other   TCM attempt successful? No   Unsuccessful attempts Attempt 2          Kelsey Victor MA    12/20/2021, 16:15 EST

## 2021-12-21 ENCOUNTER — TRANSITIONAL CARE MANAGEMENT TELEPHONE ENCOUNTER (OUTPATIENT)
Dept: CALL CENTER | Facility: HOSPITAL | Age: 81
End: 2021-12-21

## 2021-12-21 NOTE — OUTREACH NOTE
Call Center TCM Note      Responses   [facility] [place] patient discharged from? Non-BH   Does the patient have one of the following disease processes/diagnoses(primary or secondary)? Other   TCM attempt successful? No   Unsuccessful attempts Attempt 3          Mary Sandoval LPN    12/21/2021, 08:31 EST

## 2021-12-29 RX ORDER — SEMAGLUTIDE 1.34 MG/ML
0.5 INJECTION, SOLUTION SUBCUTANEOUS WEEKLY
Qty: 1 PEN | Refills: 2 | Status: SHIPPED | OUTPATIENT
Start: 2021-12-29 | End: 2022-01-05 | Stop reason: SDUPTHER

## 2022-01-05 ENCOUNTER — TELEPHONE (OUTPATIENT)
Dept: ENDOCRINOLOGY | Age: 82
End: 2022-01-05

## 2022-01-05 RX ORDER — SEMAGLUTIDE 1.34 MG/ML
0.5 INJECTION, SOLUTION SUBCUTANEOUS WEEKLY
Qty: 1 PEN | Refills: 2 | Status: SHIPPED | OUTPATIENT
Start: 2022-01-05 | End: 2022-01-14 | Stop reason: CLARIF

## 2022-01-11 ENCOUNTER — TELEPHONE (OUTPATIENT)
Dept: FAMILY MEDICINE CLINIC | Facility: CLINIC | Age: 82
End: 2022-01-11

## 2022-01-11 NOTE — TELEPHONE ENCOUNTER
Caller: Halie Guidry    Relationship: Self    Best call back number: [phone]     What medication are you requesting: ANTIBIOTIC    What are your current symptoms: 'HEAD COLD' / COUGHING / BLOWING NOSE / NOT SLEEPING WELL    How long have you been experiencing symptoms: 01/06/22    Have you had these symptoms before:    [x] Yes  [] No    Have you been treated for these symptoms before:   [x] Yes  [] No    If a prescription is needed, what is your preferred pharmacy and phone number: [id number] [address]  AT [facility] [phone] [facility] [phone]      Additional notes: PATIENT SAYS THAT SHE'S NOT INTERESTED IN AN APPOINTMENT, AND EVEN IF SHE WAS SHE CAN'T GO ANYWHERE - NO FURTHER INFORMATION PROVIDED.

## 2022-01-12 NOTE — TELEPHONE ENCOUNTER
Caller: ERASTO    Relationship to patient: EMILIA     Best call back number: [phone]    Patient is needing: EMILIA FROM [facility] IS CALLING IN REGARDS TO MESSAGE BELOW. PLEASE CALL PT OR EMILIA WHEN POSSIBLE

## 2022-01-14 ENCOUNTER — TELEPHONE (OUTPATIENT)
Dept: ENDOCRINOLOGY | Age: 82
End: 2022-01-14

## 2022-01-14 RX ORDER — DULAGLUTIDE 1.5 MG/.5ML
1.5 INJECTION, SOLUTION SUBCUTANEOUS WEEKLY
Qty: 12 PEN | Refills: 2 | Status: SHIPPED | OUTPATIENT
Start: 2022-01-14 | End: 2022-01-26 | Stop reason: SDUPTHER

## 2022-01-14 NOTE — TELEPHONE ENCOUNTER
What strength of the trulicity will patient be doing since insurance change from [facility]

## 2022-01-26 RX ORDER — DULAGLUTIDE 1.5 MG/.5ML
1.5 INJECTION, SOLUTION SUBCUTANEOUS WEEKLY
Qty: 12 PEN | Refills: 2 | Status: SHIPPED | OUTPATIENT
Start: 2022-01-26 | End: 2022-09-20

## 2022-01-27 ENCOUNTER — TELEPHONE (OUTPATIENT)
Dept: ENDOCRINOLOGY | Age: 82
End: 2022-01-27

## 2022-01-27 DIAGNOSIS — E03.9 PRIMARY HYPOTHYROIDISM: ICD-10-CM

## 2022-01-27 RX ORDER — LEVOTHYROXINE SODIUM 112 UG/1
112 TABLET ORAL DAILY
Qty: 90 TABLET | Refills: 1 | Status: SHIPPED | OUTPATIENT
Start: 2022-01-27 | End: 2022-08-24

## 2022-01-27 NOTE — TELEPHONE ENCOUNTER
[facility] is requesting new rx for L-thyroxine synthroid tabs 112MCG 90 day supply     [phone]

## 2022-01-28 DIAGNOSIS — G93.41 METABOLIC ENCEPHALOPATHY: ICD-10-CM

## 2022-01-28 RX ORDER — PREGABALIN 50 MG/1
50 CAPSULE ORAL 3 TIMES DAILY
Qty: 270 CAPSULE | Refills: 0 | Status: ON HOLD | OUTPATIENT
Start: 2022-01-28 | End: 2022-03-04

## 2022-02-23 ENCOUNTER — HOSPITAL ENCOUNTER (INPATIENT)
Facility: HOSPITAL | Age: 82
LOS: 7 days | Discharge: NURSING, REHAB OR LONG-TERM CARE | End: 2022-03-04
Attending: EMERGENCY MEDICINE | Admitting: INTERNAL MEDICINE

## 2022-02-23 ENCOUNTER — APPOINTMENT (OUTPATIENT)
Dept: CT IMAGING | Facility: HOSPITAL | Age: 82
End: 2022-02-23

## 2022-02-23 DIAGNOSIS — U07.1 COVID-19: ICD-10-CM

## 2022-02-23 DIAGNOSIS — M51.34 THORACIC DEGENERATIVE DISC DISEASE: ICD-10-CM

## 2022-02-23 DIAGNOSIS — G93.41 METABOLIC ENCEPHALOPATHY: ICD-10-CM

## 2022-02-23 DIAGNOSIS — N30.80 EMPHYSEMATOUS CYSTITIS: ICD-10-CM

## 2022-02-23 DIAGNOSIS — K80.50 CHOLEDOCHOLITHIASIS: ICD-10-CM

## 2022-02-23 DIAGNOSIS — R10.30 LOWER ABDOMINAL PAIN: Primary | ICD-10-CM

## 2022-02-23 DIAGNOSIS — N39.0 ACUTE UTI: ICD-10-CM

## 2022-02-23 DIAGNOSIS — E86.0 DEHYDRATION: ICD-10-CM

## 2022-02-23 PROBLEM — R74.01 TRANSAMINITIS: Status: ACTIVE | Noted: 2022-02-23

## 2022-02-23 LAB
ALBUMIN SERPL-MCNC: 4 G/DL (ref 3.5–5.2)
ALBUMIN SERPL-MCNC: 4.7 G/DL (ref 3.5–5.2)
ALBUMIN/GLOB SERPL: 1.2 G/DL
ALBUMIN/GLOB SERPL: 1.5 G/DL
ALP SERPL-CCNC: 235 U/L (ref 39–117)
ALP SERPL-CCNC: 275 U/L (ref 39–117)
ALT SERPL W P-5'-P-CCNC: 308 U/L (ref 1–33)
ALT SERPL W P-5'-P-CCNC: 416 U/L (ref 1–33)
ANION GAP SERPL CALCULATED.3IONS-SCNC: 15 MMOL/L (ref 5–15)
ANION GAP SERPL CALCULATED.3IONS-SCNC: 20.9 MMOL/L (ref 5–15)
AST SERPL-CCNC: 135 U/L (ref 1–32)
AST SERPL-CCNC: 209 U/L (ref 1–32)
BACTERIA UR QL AUTO: ABNORMAL /HPF
BASOPHILS # BLD AUTO: 0.07 10*3/MM3 (ref 0–0.2)
BASOPHILS NFR BLD AUTO: 0.5 % (ref 0–1.5)
BILIRUB SERPL-MCNC: 1.1 MG/DL (ref 0–1.2)
BILIRUB SERPL-MCNC: 1.7 MG/DL (ref 0–1.2)
BILIRUB UR QL STRIP: NEGATIVE
BUN SERPL-MCNC: 42 MG/DL (ref 8–23)
BUN SERPL-MCNC: 43 MG/DL (ref 8–23)
BUN/CREAT SERPL: 30.3 (ref 7–25)
BUN/CREAT SERPL: 32.6 (ref 7–25)
CALCIUM SPEC-SCNC: 9 MG/DL (ref 8.6–10.5)
CALCIUM SPEC-SCNC: 9.9 MG/DL (ref 8.6–10.5)
CHLORIDE SERPL-SCNC: 85 MMOL/L (ref 98–107)
CHLORIDE SERPL-SCNC: 93 MMOL/L (ref 98–107)
CLARITY UR: CLEAR
CO2 SERPL-SCNC: 28.1 MMOL/L (ref 22–29)
CO2 SERPL-SCNC: 30 MMOL/L (ref 22–29)
COLOR UR: ABNORMAL
CREAT SERPL-MCNC: 1.29 MG/DL (ref 0.57–1)
CREAT SERPL-MCNC: 1.42 MG/DL (ref 0.57–1)
D-LACTATE SERPL-SCNC: 1.1 MMOL/L (ref 0.5–2)
DEPRECATED RDW RBC AUTO: 44.4 FL (ref 37–54)
DEPRECATED RDW RBC AUTO: 45.8 FL (ref 37–54)
EOSINOPHIL # BLD AUTO: 0.05 10*3/MM3 (ref 0–0.4)
EOSINOPHIL NFR BLD AUTO: 0.4 % (ref 0.3–6.2)
ERYTHROCYTE [DISTWIDTH] IN BLOOD BY AUTOMATED COUNT: 13.9 % (ref 12.3–15.4)
ERYTHROCYTE [DISTWIDTH] IN BLOOD BY AUTOMATED COUNT: 13.9 % (ref 12.3–15.4)
GFR SERPL CREATININE-BSD FRML MDRD: 36 ML/MIN/1.73
GFR SERPL CREATININE-BSD FRML MDRD: 40 ML/MIN/1.73
GLOBULIN UR ELPH-MCNC: 3.2 GM/DL
GLOBULIN UR ELPH-MCNC: 3.3 GM/DL
GLUCOSE BLDC GLUCOMTR-MCNC: 134 MG/DL (ref 70–130)
GLUCOSE BLDC GLUCOMTR-MCNC: 167 MG/DL (ref 70–130)
GLUCOSE SERPL-MCNC: 167 MG/DL (ref 65–99)
GLUCOSE SERPL-MCNC: 171 MG/DL (ref 65–99)
GLUCOSE UR STRIP-MCNC: NEGATIVE MG/DL
HAV IGM SERPL QL IA: NORMAL
HBV CORE IGM SERPL QL IA: NORMAL
HBV SURFACE AG SERPL QL IA: NORMAL
HCT VFR BLD AUTO: 41.9 % (ref 34–46.6)
HCT VFR BLD AUTO: 44 % (ref 34–46.6)
HCV AB SER DONR QL: NORMAL
HGB BLD-MCNC: 13.7 G/DL (ref 12–15.9)
HGB BLD-MCNC: 14.5 G/DL (ref 12–15.9)
HGB UR QL STRIP.AUTO: ABNORMAL
HYALINE CASTS UR QL AUTO: ABNORMAL /LPF
IMM GRANULOCYTES # BLD AUTO: 0.07 10*3/MM3 (ref 0–0.05)
IMM GRANULOCYTES NFR BLD AUTO: 0.5 % (ref 0–0.5)
KETONES UR QL STRIP: ABNORMAL
LEUKOCYTE ESTERASE UR QL STRIP.AUTO: ABNORMAL
LIPASE SERPL-CCNC: 33 U/L (ref 13–60)
LYMPHOCYTES # BLD AUTO: 0.74 10*3/MM3 (ref 0.7–3.1)
LYMPHOCYTES NFR BLD AUTO: 5.3 % (ref 19.6–45.3)
MCH RBC QN AUTO: 28.9 PG (ref 26.6–33)
MCH RBC QN AUTO: 29.4 PG (ref 26.6–33)
MCHC RBC AUTO-ENTMCNC: 32.7 G/DL (ref 31.5–35.7)
MCHC RBC AUTO-ENTMCNC: 33 G/DL (ref 31.5–35.7)
MCV RBC AUTO: 87.8 FL (ref 79–97)
MCV RBC AUTO: 89.9 FL (ref 79–97)
MONOCYTES # BLD AUTO: 0.75 10*3/MM3 (ref 0.1–0.9)
MONOCYTES NFR BLD AUTO: 5.4 % (ref 5–12)
NEUTROPHILS NFR BLD AUTO: 12.32 10*3/MM3 (ref 1.7–7)
NEUTROPHILS NFR BLD AUTO: 87.9 % (ref 42.7–76)
NITRITE UR QL STRIP: NEGATIVE
NRBC BLD AUTO-RTO: 0 /100 WBC (ref 0–0.2)
PH UR STRIP.AUTO: 6 [PH] (ref 5–8)
PLATELET # BLD AUTO: 322 10*3/MM3 (ref 140–450)
PLATELET # BLD AUTO: 343 10*3/MM3 (ref 140–450)
PMV BLD AUTO: 9.7 FL (ref 6–12)
PMV BLD AUTO: 9.9 FL (ref 6–12)
POTASSIUM SERPL-SCNC: 3.4 MMOL/L (ref 3.5–5.2)
POTASSIUM SERPL-SCNC: 3.5 MMOL/L (ref 3.5–5.2)
PROCALCITONIN SERPL-MCNC: 1.57 NG/ML (ref 0–0.25)
PROT SERPL-MCNC: 7.3 G/DL (ref 6–8.5)
PROT SERPL-MCNC: 7.9 G/DL (ref 6–8.5)
PROT UR QL STRIP: ABNORMAL
QT INTERVAL: 446 MS
RBC # BLD AUTO: 4.66 10*6/MM3 (ref 3.77–5.28)
RBC # BLD AUTO: 5.01 10*6/MM3 (ref 3.77–5.28)
RBC # UR STRIP: ABNORMAL /HPF
REF LAB TEST METHOD: ABNORMAL
SARS-COV-2 RNA RESP QL NAA+PROBE: DETECTED
SODIUM SERPL-SCNC: 134 MMOL/L (ref 136–145)
SODIUM SERPL-SCNC: 138 MMOL/L (ref 136–145)
SP GR UR STRIP: 1.02 (ref 1–1.03)
SQUAMOUS #/AREA URNS HPF: ABNORMAL /HPF
TROPONIN T SERPL-MCNC: <0.01 NG/ML (ref 0–0.03)
UROBILINOGEN UR QL STRIP: ABNORMAL
WBC # UR STRIP: ABNORMAL /HPF
WBC NRBC COR # BLD: 14 10*3/MM3 (ref 3.4–10.8)
WBC NRBC COR # BLD: 15.11 10*3/MM3 (ref 3.4–10.8)

## 2022-02-23 PROCEDURE — G0378 HOSPITAL OBSERVATION PER HR: HCPCS

## 2022-02-23 PROCEDURE — 93010 ELECTROCARDIOGRAM REPORT: CPT | Performed by: INTERNAL MEDICINE

## 2022-02-23 PROCEDURE — 84145 PROCALCITONIN (PCT): CPT | Performed by: EMERGENCY MEDICINE

## 2022-02-23 PROCEDURE — U0003 INFECTIOUS AGENT DETECTION BY NUCLEIC ACID (DNA OR RNA); SEVERE ACUTE RESPIRATORY SYNDROME CORONAVIRUS 2 (SARS-COV-2) (CORONAVIRUS DISEASE [COVID-19]), AMPLIFIED PROBE TECHNIQUE, MAKING USE OF HIGH THROUGHPUT TECHNOLOGIES AS DESCRIBED BY CMS-2020-01-R: HCPCS | Performed by: PHYSICIAN ASSISTANT

## 2022-02-23 PROCEDURE — 81001 URINALYSIS AUTO W/SCOPE: CPT | Performed by: PHYSICIAN ASSISTANT

## 2022-02-23 PROCEDURE — 93005 ELECTROCARDIOGRAM TRACING: CPT | Performed by: PHYSICIAN ASSISTANT

## 2022-02-23 PROCEDURE — 85025 COMPLETE CBC W/AUTO DIFF WBC: CPT | Performed by: PHYSICIAN ASSISTANT

## 2022-02-23 PROCEDURE — 25010000002 CEFTRIAXONE PER 250 MG: Performed by: PHYSICIAN ASSISTANT

## 2022-02-23 PROCEDURE — 25010000002 PROCHLORPERAZINE 10 MG/2ML SOLUTION: Performed by: NURSE PRACTITIONER

## 2022-02-23 PROCEDURE — 25010000002 ONDANSETRON PER 1 MG: Performed by: EMERGENCY MEDICINE

## 2022-02-23 PROCEDURE — 25010000002 MORPHINE PER 10 MG: Performed by: NURSE PRACTITIONER

## 2022-02-23 PROCEDURE — 84484 ASSAY OF TROPONIN QUANT: CPT | Performed by: PHYSICIAN ASSISTANT

## 2022-02-23 PROCEDURE — 87086 URINE CULTURE/COLONY COUNT: CPT | Performed by: PHYSICIAN ASSISTANT

## 2022-02-23 PROCEDURE — 82962 GLUCOSE BLOOD TEST: CPT

## 2022-02-23 PROCEDURE — 87040 BLOOD CULTURE FOR BACTERIA: CPT | Performed by: EMERGENCY MEDICINE

## 2022-02-23 PROCEDURE — 99284 EMERGENCY DEPT VISIT MOD MDM: CPT

## 2022-02-23 PROCEDURE — 80053 COMPREHEN METABOLIC PANEL: CPT | Performed by: PHYSICIAN ASSISTANT

## 2022-02-23 PROCEDURE — 25010000002 HYDROMORPHONE PER 4 MG: Performed by: EMERGENCY MEDICINE

## 2022-02-23 PROCEDURE — 87077 CULTURE AEROBIC IDENTIFY: CPT | Performed by: PHYSICIAN ASSISTANT

## 2022-02-23 PROCEDURE — 74176 CT ABD & PELVIS W/O CONTRAST: CPT

## 2022-02-23 PROCEDURE — 83605 ASSAY OF LACTIC ACID: CPT | Performed by: EMERGENCY MEDICINE

## 2022-02-23 PROCEDURE — 25010000002 HYDRALAZINE PER 20 MG: Performed by: PHYSICIAN ASSISTANT

## 2022-02-23 PROCEDURE — 83690 ASSAY OF LIPASE: CPT | Performed by: PHYSICIAN ASSISTANT

## 2022-02-23 PROCEDURE — 80053 COMPREHEN METABOLIC PANEL: CPT | Performed by: NURSE PRACTITIONER

## 2022-02-23 PROCEDURE — 87186 SC STD MICRODIL/AGAR DIL: CPT | Performed by: PHYSICIAN ASSISTANT

## 2022-02-23 PROCEDURE — 80074 ACUTE HEPATITIS PANEL: CPT | Performed by: NURSE PRACTITIONER

## 2022-02-23 PROCEDURE — 25010000002 MORPHINE PER 10 MG: Performed by: EMERGENCY MEDICINE

## 2022-02-23 PROCEDURE — 85027 COMPLETE CBC AUTOMATED: CPT | Performed by: NURSE PRACTITIONER

## 2022-02-23 RX ORDER — HYDRALAZINE HYDROCHLORIDE 20 MG/ML
20 INJECTION INTRAMUSCULAR; INTRAVENOUS ONCE
Status: COMPLETED | OUTPATIENT
Start: 2022-02-23 | End: 2022-02-23

## 2022-02-23 RX ORDER — MORPHINE SULFATE 2 MG/ML
2 INJECTION, SOLUTION INTRAMUSCULAR; INTRAVENOUS
Status: DISCONTINUED | OUTPATIENT
Start: 2022-02-23 | End: 2022-02-26

## 2022-02-23 RX ORDER — SODIUM CHLORIDE 9 MG/ML
100 INJECTION, SOLUTION INTRAVENOUS CONTINUOUS
Status: DISCONTINUED | OUTPATIENT
Start: 2022-02-23 | End: 2022-02-24

## 2022-02-23 RX ORDER — ACETAMINOPHEN 650 MG/1
650 SUPPOSITORY RECTAL EVERY 4 HOURS PRN
Status: DISCONTINUED | OUTPATIENT
Start: 2022-02-23 | End: 2022-03-04 | Stop reason: HOSPADM

## 2022-02-23 RX ORDER — SODIUM CHLORIDE 0.9 % (FLUSH) 0.9 %
10 SYRINGE (ML) INJECTION AS NEEDED
Status: DISCONTINUED | OUTPATIENT
Start: 2022-02-23 | End: 2022-03-04 | Stop reason: HOSPADM

## 2022-02-23 RX ORDER — PROCHLORPERAZINE EDISYLATE 5 MG/ML
5 INJECTION INTRAMUSCULAR; INTRAVENOUS EVERY 6 HOURS PRN
Status: DISCONTINUED | OUTPATIENT
Start: 2022-02-23 | End: 2022-03-04 | Stop reason: HOSPADM

## 2022-02-23 RX ORDER — PANTOPRAZOLE SODIUM 40 MG/1
40 TABLET, DELAYED RELEASE ORAL
Status: DISCONTINUED | OUTPATIENT
Start: 2022-02-23 | End: 2022-03-02 | Stop reason: ALTCHOICE

## 2022-02-23 RX ORDER — HYDROCODONE BITARTRATE AND ACETAMINOPHEN 5; 325 MG/1; MG/1
1 TABLET ORAL EVERY 8 HOURS PRN
Status: DISCONTINUED | OUTPATIENT
Start: 2022-02-23 | End: 2022-03-04 | Stop reason: HOSPADM

## 2022-02-23 RX ORDER — NICOTINE POLACRILEX 4 MG
15 LOZENGE BUCCAL
Status: DISCONTINUED | OUTPATIENT
Start: 2022-02-23 | End: 2022-02-24 | Stop reason: SDUPTHER

## 2022-02-23 RX ORDER — NITROGLYCERIN 0.4 MG/1
0.4 TABLET SUBLINGUAL
Status: DISCONTINUED | OUTPATIENT
Start: 2022-02-23 | End: 2022-03-04 | Stop reason: HOSPADM

## 2022-02-23 RX ORDER — ONDANSETRON 2 MG/ML
4 INJECTION INTRAMUSCULAR; INTRAVENOUS ONCE
Status: COMPLETED | OUTPATIENT
Start: 2022-02-23 | End: 2022-02-23

## 2022-02-23 RX ORDER — CALCIUM CARBONATE 200(500)MG
2 TABLET,CHEWABLE ORAL 2 TIMES DAILY PRN
Status: DISCONTINUED | OUTPATIENT
Start: 2022-02-23 | End: 2022-03-04 | Stop reason: HOSPADM

## 2022-02-23 RX ORDER — ACETAMINOPHEN 160 MG/5ML
650 SOLUTION ORAL EVERY 4 HOURS PRN
Status: DISCONTINUED | OUTPATIENT
Start: 2022-02-23 | End: 2022-03-04 | Stop reason: HOSPADM

## 2022-02-23 RX ORDER — SODIUM CHLORIDE 0.9 % (FLUSH) 0.9 %
10 SYRINGE (ML) INJECTION EVERY 12 HOURS SCHEDULED
Status: DISCONTINUED | OUTPATIENT
Start: 2022-02-23 | End: 2022-03-04 | Stop reason: HOSPADM

## 2022-02-23 RX ORDER — ACETAMINOPHEN 325 MG/1
650 TABLET ORAL EVERY 4 HOURS PRN
Status: DISCONTINUED | OUTPATIENT
Start: 2022-02-23 | End: 2022-03-04 | Stop reason: HOSPADM

## 2022-02-23 RX ORDER — HYDROMORPHONE HYDROCHLORIDE 1 MG/ML
0.5 INJECTION, SOLUTION INTRAMUSCULAR; INTRAVENOUS; SUBCUTANEOUS ONCE
Status: COMPLETED | OUTPATIENT
Start: 2022-02-23 | End: 2022-02-23

## 2022-02-23 RX ORDER — LEVOTHYROXINE SODIUM 112 UG/1
112 TABLET ORAL DAILY
Status: DISCONTINUED | OUTPATIENT
Start: 2022-02-23 | End: 2022-03-04 | Stop reason: HOSPADM

## 2022-02-23 RX ORDER — DEXTROSE MONOHYDRATE 25 G/50ML
25 INJECTION, SOLUTION INTRAVENOUS
Status: DISCONTINUED | OUTPATIENT
Start: 2022-02-23 | End: 2022-02-24 | Stop reason: SDUPTHER

## 2022-02-23 RX ORDER — LISINOPRIL 5 MG/1
5 TABLET ORAL DAILY
Status: DISCONTINUED | OUTPATIENT
Start: 2022-02-23 | End: 2022-02-25

## 2022-02-23 RX ORDER — MORPHINE SULFATE 2 MG/ML
4 INJECTION, SOLUTION INTRAMUSCULAR; INTRAVENOUS ONCE
Status: COMPLETED | OUTPATIENT
Start: 2022-02-23 | End: 2022-02-23

## 2022-02-23 RX ORDER — LABETALOL HYDROCHLORIDE 5 MG/ML
20 INJECTION, SOLUTION INTRAVENOUS ONCE
Status: COMPLETED | OUTPATIENT
Start: 2022-02-23 | End: 2022-02-23

## 2022-02-23 RX ORDER — LABETALOL HYDROCHLORIDE 5 MG/ML
10 INJECTION, SOLUTION INTRAVENOUS ONCE
Status: COMPLETED | OUTPATIENT
Start: 2022-02-23 | End: 2022-02-23

## 2022-02-23 RX ORDER — HYDROCODONE BITARTRATE AND ACETAMINOPHEN 5; 325 MG/1; MG/1
1 TABLET ORAL EVERY 6 HOURS PRN
Status: ON HOLD | COMMUNITY
End: 2022-03-04 | Stop reason: SDUPTHER

## 2022-02-23 RX ORDER — PREGABALIN 50 MG/1
50 CAPSULE ORAL 3 TIMES DAILY
Status: DISCONTINUED | OUTPATIENT
Start: 2022-02-23 | End: 2022-03-04 | Stop reason: HOSPADM

## 2022-02-23 RX ADMIN — LABETALOL HYDROCHLORIDE 10 MG: 5 INJECTION, SOLUTION INTRAVENOUS at 02:33

## 2022-02-23 RX ADMIN — MORPHINE SULFATE 2 MG: 2 INJECTION, SOLUTION INTRAMUSCULAR; INTRAVENOUS at 23:34

## 2022-02-23 RX ADMIN — HYDROCODONE BITARTRATE AND ACETAMINOPHEN 1 TABLET: 5; 325 TABLET ORAL at 22:09

## 2022-02-23 RX ADMIN — MORPHINE SULFATE 2 MG: 2 INJECTION, SOLUTION INTRAMUSCULAR; INTRAVENOUS at 15:16

## 2022-02-23 RX ADMIN — Medication 10 ML: at 08:14

## 2022-02-23 RX ADMIN — LISINOPRIL 5 MG: 5 TABLET ORAL at 14:35

## 2022-02-23 RX ADMIN — PREGABALIN 50 MG: 50 CAPSULE ORAL at 20:03

## 2022-02-23 RX ADMIN — SODIUM CHLORIDE 500 ML: 9 INJECTION, SOLUTION INTRAVENOUS at 01:27

## 2022-02-23 RX ADMIN — LEVOTHYROXINE SODIUM 112 MCG: 0.11 TABLET ORAL at 14:36

## 2022-02-23 RX ADMIN — PROCHLORPERAZINE EDISYLATE 5 MG: 5 INJECTION INTRAMUSCULAR; INTRAVENOUS at 10:47

## 2022-02-23 RX ADMIN — ONDANSETRON 4 MG: 2 INJECTION INTRAMUSCULAR; INTRAVENOUS at 03:48

## 2022-02-23 RX ADMIN — CEFTRIAXONE 2 G: 2 INJECTION, POWDER, FOR SOLUTION INTRAMUSCULAR; INTRAVENOUS at 08:05

## 2022-02-23 RX ADMIN — HYDROMORPHONE HYDROCHLORIDE 0.5 MG: 1 INJECTION, SOLUTION INTRAMUSCULAR; INTRAVENOUS; SUBCUTANEOUS at 03:50

## 2022-02-23 RX ADMIN — Medication 10 ML: at 20:03

## 2022-02-23 RX ADMIN — HYDRALAZINE HYDROCHLORIDE 20 MG: 20 INJECTION INTRAMUSCULAR; INTRAVENOUS at 05:46

## 2022-02-23 RX ADMIN — SODIUM CHLORIDE 100 ML/HR: 9 INJECTION, SOLUTION INTRAVENOUS at 08:06

## 2022-02-23 RX ADMIN — MORPHINE SULFATE 2 MG: 2 INJECTION, SOLUTION INTRAMUSCULAR; INTRAVENOUS at 19:58

## 2022-02-23 RX ADMIN — LABETALOL HYDROCHLORIDE 20 MG: 5 INJECTION, SOLUTION INTRAVENOUS at 03:49

## 2022-02-23 RX ADMIN — ONDANSETRON 4 MG: 2 INJECTION INTRAMUSCULAR; INTRAVENOUS at 01:33

## 2022-02-23 RX ADMIN — MORPHINE SULFATE 2 MG: 2 INJECTION, SOLUTION INTRAMUSCULAR; INTRAVENOUS at 10:46

## 2022-02-23 RX ADMIN — MORPHINE SULFATE 4 MG: 2 INJECTION, SOLUTION INTRAMUSCULAR; INTRAVENOUS at 01:33

## 2022-02-23 RX ADMIN — PANTOPRAZOLE SODIUM 40 MG: 40 TABLET, DELAYED RELEASE ORAL at 17:26

## 2022-02-23 RX ADMIN — SODIUM CHLORIDE 100 ML/HR: 9 INJECTION, SOLUTION INTRAVENOUS at 18:36

## 2022-02-23 RX ADMIN — PREGABALIN 50 MG: 50 CAPSULE ORAL at 17:25

## 2022-02-24 ENCOUNTER — DOCUMENTATION (OUTPATIENT)
Dept: ENDOCRINOLOGY | Age: 82
End: 2022-02-24

## 2022-02-24 PROBLEM — E87.6 HYPOKALEMIA: Status: ACTIVE | Noted: 2022-02-24

## 2022-02-24 PROBLEM — R09.02 HYPOXIA: Status: ACTIVE | Noted: 2022-02-24

## 2022-02-24 LAB
ALBUMIN SERPL-MCNC: 3.2 G/DL (ref 3.5–5.2)
ALBUMIN/GLOB SERPL: 1.1 G/DL
ALP SERPL-CCNC: 160 U/L (ref 39–117)
ALT SERPL W P-5'-P-CCNC: 162 U/L (ref 1–33)
ANION GAP SERPL CALCULATED.3IONS-SCNC: 9 MMOL/L (ref 5–15)
AST SERPL-CCNC: 48 U/L (ref 1–32)
BILIRUB CONJ SERPL-MCNC: 0.3 MG/DL (ref 0–0.3)
BILIRUB SERPL-MCNC: 0.6 MG/DL (ref 0–1.2)
BUN SERPL-MCNC: 23 MG/DL (ref 8–23)
BUN/CREAT SERPL: 22.1 (ref 7–25)
CALCIUM SPEC-SCNC: 8.3 MG/DL (ref 8.6–10.5)
CHLORIDE SERPL-SCNC: 102 MMOL/L (ref 98–107)
CO2 SERPL-SCNC: 27 MMOL/L (ref 22–29)
CREAT SERPL-MCNC: 1.04 MG/DL (ref 0.57–1)
CRP SERPL-MCNC: 1.44 MG/DL (ref 0–0.5)
DEPRECATED RDW RBC AUTO: 44.6 FL (ref 37–54)
ERYTHROCYTE [DISTWIDTH] IN BLOOD BY AUTOMATED COUNT: 13.8 % (ref 12.3–15.4)
FERRITIN SERPL-MCNC: 127 NG/ML (ref 13–150)
GFR SERPL CREATININE-BSD FRML MDRD: 51 ML/MIN/1.73
GLOBULIN UR ELPH-MCNC: 3 GM/DL
GLUCOSE BLDC GLUCOMTR-MCNC: 121 MG/DL (ref 70–130)
GLUCOSE BLDC GLUCOMTR-MCNC: 143 MG/DL (ref 70–130)
GLUCOSE BLDC GLUCOMTR-MCNC: 160 MG/DL (ref 70–130)
GLUCOSE BLDC GLUCOMTR-MCNC: 99 MG/DL (ref 70–130)
GLUCOSE SERPL-MCNC: 174 MG/DL (ref 65–99)
HBA1C MFR BLD: 7.7 % (ref 4.8–5.6)
HCT VFR BLD AUTO: 34.7 % (ref 34–46.6)
HGB BLD-MCNC: 11.3 G/DL (ref 12–15.9)
MCH RBC QN AUTO: 28.9 PG (ref 26.6–33)
MCHC RBC AUTO-ENTMCNC: 32.6 G/DL (ref 31.5–35.7)
MCV RBC AUTO: 88.7 FL (ref 79–97)
PLATELET # BLD AUTO: 236 10*3/MM3 (ref 140–450)
PMV BLD AUTO: 9.7 FL (ref 6–12)
POTASSIUM SERPL-SCNC: 3.4 MMOL/L (ref 3.5–5.2)
PROCALCITONIN SERPL-MCNC: 0.53 NG/ML (ref 0–0.25)
PROT SERPL-MCNC: 6.2 G/DL (ref 6–8.5)
RBC # BLD AUTO: 3.91 10*6/MM3 (ref 3.77–5.28)
SODIUM SERPL-SCNC: 138 MMOL/L (ref 136–145)
WBC NRBC COR # BLD: 7.88 10*3/MM3 (ref 3.4–10.8)

## 2022-02-24 PROCEDURE — XW033E5 INTRODUCTION OF REMDESIVIR ANTI-INFECTIVE INTO PERIPHERAL VEIN, PERCUTANEOUS APPROACH, NEW TECHNOLOGY GROUP 5: ICD-10-PCS | Performed by: INTERNAL MEDICINE

## 2022-02-24 PROCEDURE — 99214 OFFICE O/P EST MOD 30 MIN: CPT | Performed by: INTERNAL MEDICINE

## 2022-02-24 PROCEDURE — 25010000002 REMDESIVIR 100 MG/20ML SOLUTION 1 EACH VIAL: Performed by: NURSE PRACTITIONER

## 2022-02-24 PROCEDURE — 82728 ASSAY OF FERRITIN: CPT | Performed by: NURSE PRACTITIONER

## 2022-02-24 PROCEDURE — 84145 PROCALCITONIN (PCT): CPT | Performed by: NURSE PRACTITIONER

## 2022-02-24 PROCEDURE — G0378 HOSPITAL OBSERVATION PER HR: HCPCS

## 2022-02-24 PROCEDURE — 80053 COMPREHEN METABOLIC PANEL: CPT | Performed by: NURSE PRACTITIONER

## 2022-02-24 PROCEDURE — 97162 PT EVAL MOD COMPLEX 30 MIN: CPT

## 2022-02-24 PROCEDURE — 97530 THERAPEUTIC ACTIVITIES: CPT

## 2022-02-24 PROCEDURE — 25010000002 CEFTRIAXONE PER 250 MG: Performed by: NURSE PRACTITIONER

## 2022-02-24 PROCEDURE — 63710000001 INSULIN LISPRO (HUMAN) PER 5 UNITS: Performed by: NURSE PRACTITIONER

## 2022-02-24 PROCEDURE — 82962 GLUCOSE BLOOD TEST: CPT

## 2022-02-24 PROCEDURE — 25010000002 MORPHINE PER 10 MG: Performed by: NURSE PRACTITIONER

## 2022-02-24 PROCEDURE — 83036 HEMOGLOBIN GLYCOSYLATED A1C: CPT | Performed by: NURSE PRACTITIONER

## 2022-02-24 PROCEDURE — 85027 COMPLETE CBC AUTOMATED: CPT | Performed by: NURSE PRACTITIONER

## 2022-02-24 PROCEDURE — 25010000002 PROCHLORPERAZINE 10 MG/2ML SOLUTION: Performed by: NURSE PRACTITIONER

## 2022-02-24 PROCEDURE — 86140 C-REACTIVE PROTEIN: CPT | Performed by: NURSE PRACTITIONER

## 2022-02-24 PROCEDURE — 82248 BILIRUBIN DIRECT: CPT | Performed by: NURSE PRACTITIONER

## 2022-02-24 RX ORDER — HYDRALAZINE HYDROCHLORIDE 20 MG/ML
10 INJECTION INTRAMUSCULAR; INTRAVENOUS EVERY 6 HOURS PRN
Status: DISCONTINUED | OUTPATIENT
Start: 2022-02-24 | End: 2022-03-04 | Stop reason: HOSPADM

## 2022-02-24 RX ORDER — POTASSIUM CHLORIDE 7.45 MG/ML
10 INJECTION INTRAVENOUS
Status: DISCONTINUED | OUTPATIENT
Start: 2022-02-24 | End: 2022-03-04 | Stop reason: HOSPADM

## 2022-02-24 RX ORDER — HYDROXYZINE HYDROCHLORIDE 25 MG/1
25 TABLET, FILM COATED ORAL ONCE
Status: COMPLETED | OUTPATIENT
Start: 2022-02-24 | End: 2022-02-24

## 2022-02-24 RX ORDER — SODIUM CHLORIDE 9 MG/ML
50 INJECTION, SOLUTION INTRAVENOUS CONTINUOUS
Status: ACTIVE | OUTPATIENT
Start: 2022-02-24 | End: 2022-02-25

## 2022-02-24 RX ORDER — POTASSIUM CHLORIDE 750 MG/1
40 TABLET, FILM COATED, EXTENDED RELEASE ORAL AS NEEDED
Status: DISCONTINUED | OUTPATIENT
Start: 2022-02-24 | End: 2022-03-04 | Stop reason: HOSPADM

## 2022-02-24 RX ORDER — POTASSIUM CHLORIDE 1.5 G/1.77G
40 POWDER, FOR SOLUTION ORAL AS NEEDED
Status: DISCONTINUED | OUTPATIENT
Start: 2022-02-24 | End: 2022-03-04 | Stop reason: HOSPADM

## 2022-02-24 RX ORDER — DEXTROSE MONOHYDRATE 25 G/50ML
25 INJECTION, SOLUTION INTRAVENOUS
Status: DISCONTINUED | OUTPATIENT
Start: 2022-02-24 | End: 2022-03-04 | Stop reason: HOSPADM

## 2022-02-24 RX ORDER — NICOTINE POLACRILEX 4 MG
15 LOZENGE BUCCAL
Status: DISCONTINUED | OUTPATIENT
Start: 2022-02-24 | End: 2022-03-04 | Stop reason: HOSPADM

## 2022-02-24 RX ORDER — INSULIN LISPRO 100 [IU]/ML
0-7 INJECTION, SOLUTION INTRAVENOUS; SUBCUTANEOUS
Status: DISCONTINUED | OUTPATIENT
Start: 2022-02-24 | End: 2022-03-02

## 2022-02-24 RX ADMIN — SODIUM CHLORIDE 50 ML/HR: 9 INJECTION, SOLUTION INTRAVENOUS at 12:40

## 2022-02-24 RX ADMIN — MORPHINE SULFATE 2 MG: 2 INJECTION, SOLUTION INTRAMUSCULAR; INTRAVENOUS at 18:24

## 2022-02-24 RX ADMIN — Medication 10 ML: at 09:00

## 2022-02-24 RX ADMIN — LEVOTHYROXINE SODIUM 112 MCG: 0.11 TABLET ORAL at 09:00

## 2022-02-24 RX ADMIN — HYDROCODONE BITARTRATE AND ACETAMINOPHEN 1 TABLET: 5; 325 TABLET ORAL at 13:30

## 2022-02-24 RX ADMIN — PREGABALIN 50 MG: 50 CAPSULE ORAL at 21:31

## 2022-02-24 RX ADMIN — POTASSIUM CHLORIDE 40 MEQ: 750 TABLET, EXTENDED RELEASE ORAL at 15:34

## 2022-02-24 RX ADMIN — PREGABALIN 50 MG: 50 CAPSULE ORAL at 15:34

## 2022-02-24 RX ADMIN — SODIUM CHLORIDE 100 ML/HR: 9 INJECTION, SOLUTION INTRAVENOUS at 07:51

## 2022-02-24 RX ADMIN — PREGABALIN 50 MG: 50 CAPSULE ORAL at 09:00

## 2022-02-24 RX ADMIN — INSULIN LISPRO 2 UNITS: 100 INJECTION, SOLUTION INTRAVENOUS; SUBCUTANEOUS at 12:38

## 2022-02-24 RX ADMIN — LISINOPRIL 5 MG: 5 TABLET ORAL at 09:00

## 2022-02-24 RX ADMIN — PROCHLORPERAZINE EDISYLATE 5 MG: 5 INJECTION INTRAMUSCULAR; INTRAVENOUS at 01:47

## 2022-02-24 RX ADMIN — PANTOPRAZOLE SODIUM 40 MG: 40 TABLET, DELAYED RELEASE ORAL at 04:01

## 2022-02-24 RX ADMIN — MORPHINE SULFATE 2 MG: 2 INJECTION, SOLUTION INTRAMUSCULAR; INTRAVENOUS at 12:19

## 2022-02-24 RX ADMIN — CEFTRIAXONE 1 G: 1 INJECTION, POWDER, FOR SOLUTION INTRAMUSCULAR; INTRAVENOUS at 09:00

## 2022-02-24 RX ADMIN — PANTOPRAZOLE SODIUM 40 MG: 40 TABLET, DELAYED RELEASE ORAL at 17:12

## 2022-02-24 RX ADMIN — REMDESIVIR 200 MG: 100 INJECTION, POWDER, LYOPHILIZED, FOR SOLUTION INTRAVENOUS at 12:38

## 2022-02-24 RX ADMIN — MORPHINE SULFATE 2 MG: 2 INJECTION, SOLUTION INTRAMUSCULAR; INTRAVENOUS at 21:31

## 2022-02-24 RX ADMIN — Medication 10 ML: at 21:31

## 2022-02-24 RX ADMIN — MORPHINE SULFATE 2 MG: 2 INJECTION, SOLUTION INTRAMUSCULAR; INTRAVENOUS at 15:34

## 2022-02-24 RX ADMIN — MORPHINE SULFATE 2 MG: 2 INJECTION, SOLUTION INTRAMUSCULAR; INTRAVENOUS at 09:09

## 2022-02-24 RX ADMIN — HYDROXYZINE HYDROCHLORIDE 25 MG: 25 TABLET ORAL at 04:01

## 2022-02-24 RX ADMIN — POTASSIUM CHLORIDE 40 MEQ: 750 TABLET, EXTENDED RELEASE ORAL at 21:31

## 2022-02-24 RX ADMIN — MORPHINE SULFATE 2 MG: 2 INJECTION, SOLUTION INTRAMUSCULAR; INTRAVENOUS at 04:02

## 2022-02-24 RX ADMIN — SODIUM CHLORIDE 50 ML/HR: 9 INJECTION, SOLUTION INTRAVENOUS at 21:31

## 2022-02-24 NOTE — PROGRESS NOTES
Benefits Investigation Summary    Prescription: Renewal     Dispensing pharmacy: [facility]    Copay amount: TRULICITY 3/31, TOUJEO $75.22    Pharmacy Payor/Plan: MARISSA PART D  BIN: [id number]  PCN: [id number]  GROUP: [id number]    Prior Auth and Med Assistance notes:

## 2022-02-25 ENCOUNTER — ANESTHESIA (OUTPATIENT)
Dept: GASTROENTEROLOGY | Facility: HOSPITAL | Age: 82
End: 2022-02-25

## 2022-02-25 ENCOUNTER — ANESTHESIA EVENT (OUTPATIENT)
Dept: GASTROENTEROLOGY | Facility: HOSPITAL | Age: 82
End: 2022-02-25

## 2022-02-25 ENCOUNTER — APPOINTMENT (OUTPATIENT)
Dept: GENERAL RADIOLOGY | Facility: HOSPITAL | Age: 82
End: 2022-02-25

## 2022-02-25 LAB
ALBUMIN SERPL-MCNC: 3.1 G/DL (ref 3.5–5.2)
ALP SERPL-CCNC: 154 U/L (ref 39–117)
ALT SERPL W P-5'-P-CCNC: 119 U/L (ref 1–33)
AST SERPL-CCNC: 30 U/L (ref 1–32)
BACTERIA SPEC AEROBE CULT: ABNORMAL
BILIRUB CONJ SERPL-MCNC: 0.2 MG/DL (ref 0–0.3)
BILIRUB INDIRECT SERPL-MCNC: 0.3 MG/DL
BILIRUB SERPL-MCNC: 0.5 MG/DL (ref 0–1.2)
CREAT SERPL-MCNC: 0.93 MG/DL (ref 0.57–1)
CRP SERPL-MCNC: 1.62 MG/DL (ref 0–0.5)
DEPRECATED RDW RBC AUTO: 45.3 FL (ref 37–54)
ERYTHROCYTE [DISTWIDTH] IN BLOOD BY AUTOMATED COUNT: 13.8 % (ref 12.3–15.4)
FERRITIN SERPL-MCNC: 111 NG/ML (ref 13–150)
GFR SERPL CREATININE-BSD FRML MDRD: 58 ML/MIN/1.73
GLUCOSE BLDC GLUCOMTR-MCNC: 113 MG/DL (ref 70–130)
GLUCOSE BLDC GLUCOMTR-MCNC: 121 MG/DL (ref 70–130)
GLUCOSE BLDC GLUCOMTR-MCNC: 128 MG/DL (ref 70–130)
GLUCOSE BLDC GLUCOMTR-MCNC: 99 MG/DL (ref 70–130)
HCT VFR BLD AUTO: 37.5 % (ref 34–46.6)
HGB BLD-MCNC: 12.3 G/DL (ref 12–15.9)
MCH RBC QN AUTO: 29.5 PG (ref 26.6–33)
MCHC RBC AUTO-ENTMCNC: 32.8 G/DL (ref 31.5–35.7)
MCV RBC AUTO: 89.9 FL (ref 79–97)
PLATELET # BLD AUTO: 257 10*3/MM3 (ref 140–450)
PMV BLD AUTO: 9.9 FL (ref 6–12)
POTASSIUM SERPL-SCNC: 4.4 MMOL/L (ref 3.5–5.2)
PROT SERPL-MCNC: 6.4 G/DL (ref 6–8.5)
RBC # BLD AUTO: 4.17 10*6/MM3 (ref 3.77–5.28)
WBC NRBC COR # BLD: 8.26 10*3/MM3 (ref 3.4–10.8)

## 2022-02-25 PROCEDURE — 0FC98ZZ EXTIRPATION OF MATTER FROM COMMON BILE DUCT, VIA NATURAL OR ARTIFICIAL OPENING ENDOSCOPIC: ICD-10-PCS | Performed by: INTERNAL MEDICINE

## 2022-02-25 PROCEDURE — 80076 HEPATIC FUNCTION PANEL: CPT | Performed by: NURSE PRACTITIONER

## 2022-02-25 PROCEDURE — 25010000002 REMDESIVIR 100 MG/20ML SOLUTION 1 EACH VIAL: Performed by: NURSE PRACTITIONER

## 2022-02-25 PROCEDURE — 74328 X-RAY BILE DUCT ENDOSCOPY: CPT

## 2022-02-25 PROCEDURE — 25010000002 HYDRALAZINE PER 20 MG: Performed by: ANESTHESIOLOGY

## 2022-02-25 PROCEDURE — C1769 GUIDE WIRE: HCPCS | Performed by: INTERNAL MEDICINE

## 2022-02-25 PROCEDURE — 25010000002 MORPHINE PER 10 MG: Performed by: INTERNAL MEDICINE

## 2022-02-25 PROCEDURE — 25010000002 HYDRALAZINE PER 20 MG: Performed by: NURSE PRACTITIONER

## 2022-02-25 PROCEDURE — 25010000002 PROPOFOL 10 MG/ML EMULSION: Performed by: ANESTHESIOLOGY

## 2022-02-25 PROCEDURE — 85027 COMPLETE CBC AUTOMATED: CPT | Performed by: NURSE PRACTITIONER

## 2022-02-25 PROCEDURE — 25010000002 CEFTRIAXONE PER 250 MG: Performed by: NURSE PRACTITIONER

## 2022-02-25 PROCEDURE — 82962 GLUCOSE BLOOD TEST: CPT

## 2022-02-25 PROCEDURE — 84132 ASSAY OF SERUM POTASSIUM: CPT | Performed by: INTERNAL MEDICINE

## 2022-02-25 PROCEDURE — 25010000002 MORPHINE PER 10 MG: Performed by: NURSE PRACTITIONER

## 2022-02-25 PROCEDURE — 43262 ENDO CHOLANGIOPANCREATOGRAPH: CPT | Performed by: INTERNAL MEDICINE

## 2022-02-25 PROCEDURE — 86140 C-REACTIVE PROTEIN: CPT | Performed by: NURSE PRACTITIONER

## 2022-02-25 PROCEDURE — 82728 ASSAY OF FERRITIN: CPT | Performed by: NURSE PRACTITIONER

## 2022-02-25 PROCEDURE — 43264 ERCP REMOVE DUCT CALCULI: CPT | Performed by: INTERNAL MEDICINE

## 2022-02-25 PROCEDURE — 82565 ASSAY OF CREATININE: CPT | Performed by: NURSE PRACTITIONER

## 2022-02-25 RX ORDER — LIDOCAINE HYDROCHLORIDE 20 MG/ML
INJECTION, SOLUTION INFILTRATION; PERINEURAL AS NEEDED
Status: DISCONTINUED | OUTPATIENT
Start: 2022-02-25 | End: 2022-02-25 | Stop reason: SURG

## 2022-02-25 RX ORDER — NICARDIPINE HYDROCHLORIDE 2.5 MG/ML
INJECTION INTRAVENOUS AS NEEDED
Status: DISCONTINUED | OUTPATIENT
Start: 2022-02-25 | End: 2022-02-25 | Stop reason: SURG

## 2022-02-25 RX ORDER — LISINOPRIL 10 MG/1
10 TABLET ORAL DAILY
Status: DISCONTINUED | OUTPATIENT
Start: 2022-02-26 | End: 2022-03-04 | Stop reason: HOSPADM

## 2022-02-25 RX ORDER — HYDRALAZINE HYDROCHLORIDE 20 MG/ML
INJECTION INTRAMUSCULAR; INTRAVENOUS AS NEEDED
Status: DISCONTINUED | OUTPATIENT
Start: 2022-02-25 | End: 2022-02-25 | Stop reason: SURG

## 2022-02-25 RX ORDER — PROPOFOL 10 MG/ML
VIAL (ML) INTRAVENOUS CONTINUOUS PRN
Status: DISCONTINUED | OUTPATIENT
Start: 2022-02-25 | End: 2022-02-25 | Stop reason: SURG

## 2022-02-25 RX ORDER — PROMETHAZINE HYDROCHLORIDE 25 MG/1
25 TABLET ORAL ONCE AS NEEDED
Status: DISCONTINUED | OUTPATIENT
Start: 2022-02-25 | End: 2022-02-25 | Stop reason: HOSPADM

## 2022-02-25 RX ORDER — PROMETHAZINE HYDROCHLORIDE 25 MG/1
25 SUPPOSITORY RECTAL ONCE AS NEEDED
Status: DISCONTINUED | OUTPATIENT
Start: 2022-02-25 | End: 2022-02-25 | Stop reason: HOSPADM

## 2022-02-25 RX ORDER — PROPOFOL 10 MG/ML
VIAL (ML) INTRAVENOUS AS NEEDED
Status: DISCONTINUED | OUTPATIENT
Start: 2022-02-25 | End: 2022-02-25 | Stop reason: SURG

## 2022-02-25 RX ORDER — SODIUM CHLORIDE, SODIUM LACTATE, POTASSIUM CHLORIDE, CALCIUM CHLORIDE 600; 310; 30; 20 MG/100ML; MG/100ML; MG/100ML; MG/100ML
INJECTION, SOLUTION INTRAVENOUS CONTINUOUS PRN
Status: DISCONTINUED | OUTPATIENT
Start: 2022-02-25 | End: 2022-02-25 | Stop reason: SURG

## 2022-02-25 RX ADMIN — REMDESIVIR 100 MG: 100 INJECTION, POWDER, LYOPHILIZED, FOR SOLUTION INTRAVENOUS at 11:51

## 2022-02-25 RX ADMIN — Medication 140 MCG/KG/MIN: at 16:13

## 2022-02-25 RX ADMIN — PREGABALIN 50 MG: 50 CAPSULE ORAL at 08:50

## 2022-02-25 RX ADMIN — LISINOPRIL 5 MG: 5 TABLET ORAL at 08:50

## 2022-02-25 RX ADMIN — SODIUM CHLORIDE, POTASSIUM CHLORIDE, SODIUM LACTATE AND CALCIUM CHLORIDE: 600; 310; 30; 20 INJECTION, SOLUTION INTRAVENOUS at 16:10

## 2022-02-25 RX ADMIN — PROPOFOL 70 MG: 10 INJECTION, EMULSION INTRAVENOUS at 16:13

## 2022-02-25 RX ADMIN — PREGABALIN 50 MG: 50 CAPSULE ORAL at 20:05

## 2022-02-25 RX ADMIN — LEVOTHYROXINE SODIUM 112 MCG: 0.11 TABLET ORAL at 08:50

## 2022-02-25 RX ADMIN — MORPHINE SULFATE 2 MG: 2 INJECTION, SOLUTION INTRAMUSCULAR; INTRAVENOUS at 11:51

## 2022-02-25 RX ADMIN — HYDRALAZINE HYDROCHLORIDE 10 MG: 20 INJECTION INTRAMUSCULAR; INTRAVENOUS at 00:27

## 2022-02-25 RX ADMIN — METOPROLOL TARTRATE 2.5 MG: 5 INJECTION, SOLUTION INTRAVENOUS at 16:32

## 2022-02-25 RX ADMIN — MORPHINE SULFATE 2 MG: 2 INJECTION, SOLUTION INTRAMUSCULAR; INTRAVENOUS at 21:46

## 2022-02-25 RX ADMIN — HYDROCODONE BITARTRATE AND ACETAMINOPHEN 1 TABLET: 5; 325 TABLET ORAL at 18:45

## 2022-02-25 RX ADMIN — PANTOPRAZOLE SODIUM 40 MG: 40 TABLET, DELAYED RELEASE ORAL at 07:04

## 2022-02-25 RX ADMIN — Medication 10 ML: at 08:50

## 2022-02-25 RX ADMIN — PREGABALIN 50 MG: 50 CAPSULE ORAL at 18:45

## 2022-02-25 RX ADMIN — MORPHINE SULFATE 2 MG: 2 INJECTION, SOLUTION INTRAMUSCULAR; INTRAVENOUS at 00:28

## 2022-02-25 RX ADMIN — NICARDIPINE HYDROCHLORIDE 0.2 MG: 25 INJECTION, SOLUTION INTRAVENOUS at 17:03

## 2022-02-25 RX ADMIN — CEFTRIAXONE 1 G: 1 INJECTION, POWDER, FOR SOLUTION INTRAMUSCULAR; INTRAVENOUS at 08:50

## 2022-02-25 RX ADMIN — METOPROLOL TARTRATE 1.5 MG: 5 INJECTION, SOLUTION INTRAVENOUS at 16:43

## 2022-02-25 RX ADMIN — PANTOPRAZOLE SODIUM 40 MG: 40 TABLET, DELAYED RELEASE ORAL at 18:45

## 2022-02-25 RX ADMIN — LIDOCAINE HYDROCHLORIDE 40 MG: 20 INJECTION, SOLUTION INFILTRATION; PERINEURAL at 16:13

## 2022-02-25 RX ADMIN — HYDRALAZINE HYDROCHLORIDE 10 MG: 20 INJECTION, SOLUTION INTRAMUSCULAR; INTRAVENOUS at 16:41

## 2022-02-25 RX ADMIN — Medication 10 ML: at 20:13

## 2022-02-25 RX ADMIN — HYDRALAZINE HYDROCHLORIDE 10 MG: 20 INJECTION, SOLUTION INTRAMUSCULAR; INTRAVENOUS at 16:49

## 2022-02-25 RX ADMIN — METOPROLOL TARTRATE 1 MG: 5 INJECTION, SOLUTION INTRAVENOUS at 16:38

## 2022-02-25 NOTE — ANESTHESIA POSTPROCEDURE EVALUATION
Patient: Halie Guidry    Procedure Summary     Date: 02/25/22 [facility]    Anesthesia Start: 1609 Anesthesia Stop: 1714    Procedure: ENDOSCOPIC RETROGRADE CHOLANGIOPANCREATOGRAPHY with sphincterotomy and balloon sweep (N/A ) Diagnosis:       Choledocholithiasis      (Choledocholithiasis [K80.50])    Surgeons: Chilo Wilhelm MD Provider: Segundo Daniels MD    Anesthesia Type: MAC ASA Status: 3          Anesthesia Type: MAC    Vitals  Vitals Value Taken Time   /94 02/25/22 1729   Temp     Pulse 103 02/25/22 1729   Resp 18 02/25/22 1729   SpO2 96 % 02/25/22 1729           Post Anesthesia Care and Evaluation    Patient location during evaluation: bedside  Pain management: adequate  Airway patency: patent  Anesthetic complications: No anesthetic complications    Cardiovascular status: acceptable  Respiratory status: acceptable  Hydration status: acceptable

## 2022-02-25 NOTE — ANESTHESIA PREPROCEDURE EVALUATION
Anesthesia Evaluation     Patient summary reviewed and Nursing notes reviewed   NPO Solid Status: > 8 hours  NPO Liquid Status: > 2 hours           Airway   Mallampati: I  TM distance: >3 FB  Neck ROM: full  No difficulty expected  Dental - normal exam   (+) upper dentures and lower dentures    Pulmonary - normal exam   (+) a smoker Former,   Cardiovascular - normal exam  Exercise tolerance: poor (<4 METS)    ECG reviewed    (+) hypertension poorly controlled 2 medications or greater, hyperlipidemia,       Neuro/Psych  (+) seizures well controlled, headaches, psychiatric history,    GI/Hepatic/Renal/Endo    (+)   diabetes mellitus poorly controlled using insulin, thyroid problem hypothyroidism  (-) no renal disease    Musculoskeletal     (+) back pain,   Abdominal  - normal exam    Bowel sounds: normal.   Substance History      OB/GYN          Other                      Anesthesia Plan    ASA 3     MAC       Anesthetic plan, all risks, benefits, and alternatives have been provided, discussed and informed consent has been obtained with: patient.        CODE STATUS:    Code Status (Patient has no pulse and is not breathing): CPR (Attempt to Resuscitate)  Medical Interventions (Patient has pulse or is breathing): Full Support

## 2022-02-26 LAB
ALBUMIN SERPL-MCNC: 3.1 G/DL (ref 3.5–5.2)
ALBUMIN/GLOB SERPL: 1.1 G/DL
ALP SERPL-CCNC: 127 U/L (ref 39–117)
ALT SERPL W P-5'-P-CCNC: 79 U/L (ref 1–33)
ANION GAP SERPL CALCULATED.3IONS-SCNC: 12 MMOL/L (ref 5–15)
AST SERPL-CCNC: 24 U/L (ref 1–32)
BILIRUB CONJ SERPL-MCNC: 0.2 MG/DL (ref 0–0.3)
BILIRUB SERPL-MCNC: 0.4 MG/DL (ref 0–1.2)
BUN SERPL-MCNC: 16 MG/DL (ref 8–23)
BUN/CREAT SERPL: 16.3 (ref 7–25)
CALCIUM SPEC-SCNC: 8.2 MG/DL (ref 8.6–10.5)
CHLORIDE SERPL-SCNC: 107 MMOL/L (ref 98–107)
CO2 SERPL-SCNC: 20 MMOL/L (ref 22–29)
CREAT SERPL-MCNC: 0.98 MG/DL (ref 0.57–1)
CRP SERPL-MCNC: 3.46 MG/DL (ref 0–0.5)
DEPRECATED RDW RBC AUTO: 42.4 FL (ref 37–54)
ERYTHROCYTE [DISTWIDTH] IN BLOOD BY AUTOMATED COUNT: 13.8 % (ref 12.3–15.4)
FERRITIN SERPL-MCNC: 126 NG/ML (ref 13–150)
GFR SERPL CREATININE-BSD FRML MDRD: 54 ML/MIN/1.73
GLOBULIN UR ELPH-MCNC: 2.7 GM/DL
GLUCOSE BLDC GLUCOMTR-MCNC: 136 MG/DL (ref 70–130)
GLUCOSE BLDC GLUCOMTR-MCNC: 141 MG/DL (ref 70–130)
GLUCOSE BLDC GLUCOMTR-MCNC: 153 MG/DL (ref 70–130)
GLUCOSE BLDC GLUCOMTR-MCNC: 189 MG/DL (ref 70–130)
GLUCOSE SERPL-MCNC: 134 MG/DL (ref 65–99)
HCT VFR BLD AUTO: 34 % (ref 34–46.6)
HGB BLD-MCNC: 11.7 G/DL (ref 12–15.9)
MCH RBC QN AUTO: 29.7 PG (ref 26.6–33)
MCHC RBC AUTO-ENTMCNC: 34.4 G/DL (ref 31.5–35.7)
MCV RBC AUTO: 86.3 FL (ref 79–97)
PLATELET # BLD AUTO: 270 10*3/MM3 (ref 140–450)
PMV BLD AUTO: 9.8 FL (ref 6–12)
POTASSIUM SERPL-SCNC: 3.9 MMOL/L (ref 3.5–5.2)
PROCALCITONIN SERPL-MCNC: 0.23 NG/ML (ref 0–0.25)
PROT SERPL-MCNC: 5.8 G/DL (ref 6–8.5)
RBC # BLD AUTO: 3.94 10*6/MM3 (ref 3.77–5.28)
SODIUM SERPL-SCNC: 139 MMOL/L (ref 136–145)
WBC NRBC COR # BLD: 9.07 10*3/MM3 (ref 3.4–10.8)

## 2022-02-26 PROCEDURE — 85027 COMPLETE CBC AUTOMATED: CPT | Performed by: INTERNAL MEDICINE

## 2022-02-26 PROCEDURE — 84145 PROCALCITONIN (PCT): CPT | Performed by: INTERNAL MEDICINE

## 2022-02-26 PROCEDURE — 25010000002 CEFTRIAXONE PER 250 MG: Performed by: NURSE PRACTITIONER

## 2022-02-26 PROCEDURE — 86140 C-REACTIVE PROTEIN: CPT | Performed by: INTERNAL MEDICINE

## 2022-02-26 PROCEDURE — 82248 BILIRUBIN DIRECT: CPT | Performed by: INTERNAL MEDICINE

## 2022-02-26 PROCEDURE — 63710000001 INSULIN LISPRO (HUMAN) PER 5 UNITS: Performed by: INTERNAL MEDICINE

## 2022-02-26 PROCEDURE — 80053 COMPREHEN METABOLIC PANEL: CPT | Performed by: INTERNAL MEDICINE

## 2022-02-26 PROCEDURE — 97110 THERAPEUTIC EXERCISES: CPT

## 2022-02-26 PROCEDURE — 97530 THERAPEUTIC ACTIVITIES: CPT

## 2022-02-26 PROCEDURE — 99231 SBSQ HOSP IP/OBS SF/LOW 25: CPT | Performed by: INTERNAL MEDICINE

## 2022-02-26 PROCEDURE — 25010000002 REMDESIVIR 100 MG/20ML SOLUTION 1 EACH VIAL: Performed by: INTERNAL MEDICINE

## 2022-02-26 PROCEDURE — 25010000002 MORPHINE PER 10 MG: Performed by: INTERNAL MEDICINE

## 2022-02-26 PROCEDURE — 82728 ASSAY OF FERRITIN: CPT | Performed by: INTERNAL MEDICINE

## 2022-02-26 PROCEDURE — 82962 GLUCOSE BLOOD TEST: CPT

## 2022-02-26 RX ADMIN — MORPHINE SULFATE 2 MG: 2 INJECTION, SOLUTION INTRAMUSCULAR; INTRAVENOUS at 05:18

## 2022-02-26 RX ADMIN — LEVOTHYROXINE SODIUM 112 MCG: 0.11 TABLET ORAL at 10:00

## 2022-02-26 RX ADMIN — CEFTRIAXONE 1 G: 1 INJECTION, POWDER, FOR SOLUTION INTRAMUSCULAR; INTRAVENOUS at 10:01

## 2022-02-26 RX ADMIN — Medication 10 ML: at 20:01

## 2022-02-26 RX ADMIN — PREGABALIN 50 MG: 50 CAPSULE ORAL at 09:59

## 2022-02-26 RX ADMIN — REMDESIVIR 100 MG: 100 INJECTION, POWDER, LYOPHILIZED, FOR SOLUTION INTRAVENOUS at 13:12

## 2022-02-26 RX ADMIN — INSULIN LISPRO 2 UNITS: 100 INJECTION, SOLUTION INTRAVENOUS; SUBCUTANEOUS at 10:00

## 2022-02-26 RX ADMIN — MORPHINE SULFATE 2 MG: 2 INJECTION, SOLUTION INTRAMUSCULAR; INTRAVENOUS at 10:32

## 2022-02-26 RX ADMIN — BENZOCAINE AND MENTHOL 1 LOZENGE: 15; 3.6 LOZENGE ORAL at 05:21

## 2022-02-26 RX ADMIN — Medication 10 ML: at 10:01

## 2022-02-26 RX ADMIN — PREGABALIN 50 MG: 50 CAPSULE ORAL at 20:01

## 2022-02-26 RX ADMIN — PANTOPRAZOLE SODIUM 40 MG: 40 TABLET, DELAYED RELEASE ORAL at 17:39

## 2022-02-26 RX ADMIN — PREGABALIN 50 MG: 50 CAPSULE ORAL at 17:38

## 2022-02-26 RX ADMIN — LISINOPRIL 10 MG: 10 TABLET ORAL at 10:00

## 2022-02-26 RX ADMIN — INSULIN LISPRO 2 UNITS: 100 INJECTION, SOLUTION INTRAVENOUS; SUBCUTANEOUS at 17:39

## 2022-02-26 RX ADMIN — MORPHINE SULFATE 2 MG: 2 INJECTION, SOLUTION INTRAMUSCULAR; INTRAVENOUS at 13:19

## 2022-02-26 RX ADMIN — PANTOPRAZOLE SODIUM 40 MG: 40 TABLET, DELAYED RELEASE ORAL at 05:06

## 2022-02-26 RX ADMIN — HYDROCODONE BITARTRATE AND ACETAMINOPHEN 1 TABLET: 5; 325 TABLET ORAL at 17:38

## 2022-02-27 ENCOUNTER — APPOINTMENT (OUTPATIENT)
Dept: CT IMAGING | Facility: HOSPITAL | Age: 82
End: 2022-02-27

## 2022-02-27 LAB
ALBUMIN SERPL-MCNC: 3.1 G/DL (ref 3.5–5.2)
ALBUMIN SERPL-MCNC: 3.2 G/DL (ref 3.5–5.2)
ALBUMIN/GLOB SERPL: 1 G/DL
ALP SERPL-CCNC: 116 U/L (ref 39–117)
ALP SERPL-CCNC: 126 U/L (ref 39–117)
ALT SERPL W P-5'-P-CCNC: 60 U/L (ref 1–33)
ALT SERPL W P-5'-P-CCNC: 61 U/L (ref 1–33)
ANION GAP SERPL CALCULATED.3IONS-SCNC: 14.8 MMOL/L (ref 5–15)
AST SERPL-CCNC: 18 U/L (ref 1–32)
AST SERPL-CCNC: 19 U/L (ref 1–32)
BILIRUB CONJ SERPL-MCNC: 0.2 MG/DL (ref 0–0.3)
BILIRUB INDIRECT SERPL-MCNC: 0.1 MG/DL
BILIRUB SERPL-MCNC: 0.3 MG/DL (ref 0–1.2)
BILIRUB SERPL-MCNC: 0.3 MG/DL (ref 0–1.2)
BUN SERPL-MCNC: 20 MG/DL (ref 8–23)
BUN/CREAT SERPL: 18.7 (ref 7–25)
CALCIUM SPEC-SCNC: 8.5 MG/DL (ref 8.6–10.5)
CHLORIDE SERPL-SCNC: 105 MMOL/L (ref 98–107)
CO2 SERPL-SCNC: 18.2 MMOL/L (ref 22–29)
CREAT SERPL-MCNC: 1.07 MG/DL (ref 0.57–1)
CREAT SERPL-MCNC: 1.08 MG/DL (ref 0.57–1)
CRP SERPL-MCNC: 3.11 MG/DL (ref 0–0.5)
FERRITIN SERPL-MCNC: 139 NG/ML (ref 13–150)
GFR SERPL CREATININE-BSD FRML MDRD: 49 ML/MIN/1.73
GFR SERPL CREATININE-BSD FRML MDRD: 49 ML/MIN/1.73
GLOBULIN UR ELPH-MCNC: 3.1 GM/DL
GLUCOSE BLDC GLUCOMTR-MCNC: 181 MG/DL (ref 70–130)
GLUCOSE BLDC GLUCOMTR-MCNC: 184 MG/DL (ref 70–130)
GLUCOSE BLDC GLUCOMTR-MCNC: 185 MG/DL (ref 70–130)
GLUCOSE BLDC GLUCOMTR-MCNC: 238 MG/DL (ref 70–130)
GLUCOSE SERPL-MCNC: 199 MG/DL (ref 65–99)
POTASSIUM SERPL-SCNC: 4.1 MMOL/L (ref 3.5–5.2)
PROT SERPL-MCNC: 5.8 G/DL (ref 6–8.5)
PROT SERPL-MCNC: 6.2 G/DL (ref 6–8.5)
SODIUM SERPL-SCNC: 138 MMOL/L (ref 136–145)

## 2022-02-27 PROCEDURE — 74176 CT ABD & PELVIS W/O CONTRAST: CPT

## 2022-02-27 PROCEDURE — 82248 BILIRUBIN DIRECT: CPT | Performed by: INTERNAL MEDICINE

## 2022-02-27 PROCEDURE — 86140 C-REACTIVE PROTEIN: CPT | Performed by: INTERNAL MEDICINE

## 2022-02-27 PROCEDURE — 80053 COMPREHEN METABOLIC PANEL: CPT | Performed by: INTERNAL MEDICINE

## 2022-02-27 PROCEDURE — 63710000001 INSULIN LISPRO (HUMAN) PER 5 UNITS: Performed by: INTERNAL MEDICINE

## 2022-02-27 PROCEDURE — 82962 GLUCOSE BLOOD TEST: CPT

## 2022-02-27 PROCEDURE — 82565 ASSAY OF CREATININE: CPT | Performed by: INTERNAL MEDICINE

## 2022-02-27 PROCEDURE — 82728 ASSAY OF FERRITIN: CPT | Performed by: INTERNAL MEDICINE

## 2022-02-27 PROCEDURE — 25010000002 REMDESIVIR 100 MG/20ML SOLUTION 1 EACH VIAL: Performed by: INTERNAL MEDICINE

## 2022-02-27 PROCEDURE — 25010000002 CEFTRIAXONE PER 250 MG: Performed by: NURSE PRACTITIONER

## 2022-02-27 RX ORDER — POLYETHYLENE GLYCOL 3350 17 G/17G
17 POWDER, FOR SOLUTION ORAL 2 TIMES DAILY
Status: DISCONTINUED | OUTPATIENT
Start: 2022-02-27 | End: 2022-03-01

## 2022-02-27 RX ADMIN — PANTOPRAZOLE SODIUM 40 MG: 40 TABLET, DELAYED RELEASE ORAL at 17:49

## 2022-02-27 RX ADMIN — HYDROCODONE BITARTRATE AND ACETAMINOPHEN 1 TABLET: 5; 325 TABLET ORAL at 17:49

## 2022-02-27 RX ADMIN — HYDROCODONE BITARTRATE AND ACETAMINOPHEN 1 TABLET: 5; 325 TABLET ORAL at 09:22

## 2022-02-27 RX ADMIN — POLYETHYLENE GLYCOL 3350 17 G: 17 POWDER, FOR SOLUTION ORAL at 20:54

## 2022-02-27 RX ADMIN — POLYETHYLENE GLYCOL 3350 17 G: 17 POWDER, FOR SOLUTION ORAL at 14:15

## 2022-02-27 RX ADMIN — PREGABALIN 50 MG: 50 CAPSULE ORAL at 09:04

## 2022-02-27 RX ADMIN — INSULIN LISPRO 2 UNITS: 100 INJECTION, SOLUTION INTRAVENOUS; SUBCUTANEOUS at 09:04

## 2022-02-27 RX ADMIN — PANTOPRAZOLE SODIUM 40 MG: 40 TABLET, DELAYED RELEASE ORAL at 05:53

## 2022-02-27 RX ADMIN — REMDESIVIR 100 MG: 100 INJECTION, POWDER, LYOPHILIZED, FOR SOLUTION INTRAVENOUS at 14:16

## 2022-02-27 RX ADMIN — LISINOPRIL 10 MG: 10 TABLET ORAL at 09:04

## 2022-02-27 RX ADMIN — PREGABALIN 50 MG: 50 CAPSULE ORAL at 20:54

## 2022-02-27 RX ADMIN — Medication 10 ML: at 20:54

## 2022-02-27 RX ADMIN — PREGABALIN 50 MG: 50 CAPSULE ORAL at 17:49

## 2022-02-27 RX ADMIN — CEFTRIAXONE 1 G: 1 INJECTION, POWDER, FOR SOLUTION INTRAMUSCULAR; INTRAVENOUS at 09:04

## 2022-02-27 RX ADMIN — LEVOTHYROXINE SODIUM 112 MCG: 0.11 TABLET ORAL at 09:04

## 2022-02-27 RX ADMIN — INSULIN LISPRO 2 UNITS: 100 INJECTION, SOLUTION INTRAVENOUS; SUBCUTANEOUS at 17:49

## 2022-02-27 RX ADMIN — Medication 10 ML: at 09:05

## 2022-02-28 ENCOUNTER — APPOINTMENT (OUTPATIENT)
Dept: GENERAL RADIOLOGY | Facility: HOSPITAL | Age: 82
End: 2022-02-28

## 2022-02-28 LAB
ALBUMIN SERPL-MCNC: 2.7 G/DL (ref 3.5–5.2)
ALBUMIN/GLOB SERPL: 0.8 G/DL
ALP SERPL-CCNC: 118 U/L (ref 39–117)
ALT SERPL W P-5'-P-CCNC: 44 U/L (ref 1–33)
ANION GAP SERPL CALCULATED.3IONS-SCNC: 9.7 MMOL/L (ref 5–15)
AST SERPL-CCNC: 14 U/L (ref 1–32)
BACTERIA SPEC AEROBE CULT: NORMAL
BACTERIA SPEC AEROBE CULT: NORMAL
BILIRUB SERPL-MCNC: 0.3 MG/DL (ref 0–1.2)
BUN SERPL-MCNC: 26 MG/DL (ref 8–23)
BUN/CREAT SERPL: 25.2 (ref 7–25)
CALCIUM SPEC-SCNC: 8.3 MG/DL (ref 8.6–10.5)
CHLORIDE SERPL-SCNC: 104 MMOL/L (ref 98–107)
CO2 SERPL-SCNC: 21.3 MMOL/L (ref 22–29)
CREAT SERPL-MCNC: 1.03 MG/DL (ref 0.57–1)
CRP SERPL-MCNC: 1.87 MG/DL (ref 0–0.5)
FERRITIN SERPL-MCNC: 97.5 NG/ML (ref 13–150)
GFR SERPL CREATININE-BSD FRML MDRD: 51 ML/MIN/1.73
GLOBULIN UR ELPH-MCNC: 3.2 GM/DL
GLUCOSE BLDC GLUCOMTR-MCNC: 217 MG/DL (ref 70–130)
GLUCOSE BLDC GLUCOMTR-MCNC: 238 MG/DL (ref 70–130)
GLUCOSE BLDC GLUCOMTR-MCNC: 277 MG/DL (ref 70–130)
GLUCOSE BLDC GLUCOMTR-MCNC: 315 MG/DL (ref 70–130)
GLUCOSE SERPL-MCNC: 196 MG/DL (ref 65–99)
POTASSIUM SERPL-SCNC: 4.2 MMOL/L (ref 3.5–5.2)
PROCALCITONIN SERPL-MCNC: 0.11 NG/ML (ref 0–0.25)
PROT SERPL-MCNC: 5.9 G/DL (ref 6–8.5)
SODIUM SERPL-SCNC: 135 MMOL/L (ref 136–145)

## 2022-02-28 PROCEDURE — 25010000002 CEFTRIAXONE PER 250 MG: Performed by: NURSE PRACTITIONER

## 2022-02-28 PROCEDURE — 86140 C-REACTIVE PROTEIN: CPT | Performed by: INTERNAL MEDICINE

## 2022-02-28 PROCEDURE — 82962 GLUCOSE BLOOD TEST: CPT

## 2022-02-28 PROCEDURE — 84145 PROCALCITONIN (PCT): CPT | Performed by: INTERNAL MEDICINE

## 2022-02-28 PROCEDURE — 25010000002 REMDESIVIR 100 MG/20ML SOLUTION 1 EACH VIAL: Performed by: INTERNAL MEDICINE

## 2022-02-28 PROCEDURE — 63710000001 INSULIN LISPRO (HUMAN) PER 5 UNITS: Performed by: INTERNAL MEDICINE

## 2022-02-28 PROCEDURE — 80053 COMPREHEN METABOLIC PANEL: CPT | Performed by: NURSE PRACTITIONER

## 2022-02-28 PROCEDURE — 82728 ASSAY OF FERRITIN: CPT | Performed by: INTERNAL MEDICINE

## 2022-02-28 PROCEDURE — 71045 X-RAY EXAM CHEST 1 VIEW: CPT

## 2022-02-28 RX ORDER — CEFDINIR 300 MG/1
300 CAPSULE ORAL EVERY 12 HOURS SCHEDULED
Status: DISCONTINUED | OUTPATIENT
Start: 2022-03-01 | End: 2022-03-04 | Stop reason: HOSPADM

## 2022-02-28 RX ADMIN — PREGABALIN 50 MG: 50 CAPSULE ORAL at 17:51

## 2022-02-28 RX ADMIN — INSULIN LISPRO 5 UNITS: 100 INJECTION, SOLUTION INTRAVENOUS; SUBCUTANEOUS at 11:04

## 2022-02-28 RX ADMIN — INSULIN LISPRO 4 UNITS: 100 INJECTION, SOLUTION INTRAVENOUS; SUBCUTANEOUS at 17:51

## 2022-02-28 RX ADMIN — REMDESIVIR 100 MG: 100 INJECTION, POWDER, LYOPHILIZED, FOR SOLUTION INTRAVENOUS at 17:51

## 2022-02-28 RX ADMIN — Medication 10 ML: at 20:14

## 2022-02-28 RX ADMIN — INSULIN GLARGINE-YFGN 25 UNITS: 100 INJECTION, SOLUTION SUBCUTANEOUS at 21:00

## 2022-02-28 RX ADMIN — PANTOPRAZOLE SODIUM 40 MG: 40 TABLET, DELAYED RELEASE ORAL at 11:05

## 2022-02-28 RX ADMIN — PANTOPRAZOLE SODIUM 40 MG: 40 TABLET, DELAYED RELEASE ORAL at 17:51

## 2022-02-28 RX ADMIN — LISINOPRIL 10 MG: 10 TABLET ORAL at 11:05

## 2022-02-28 RX ADMIN — Medication 10 ML: at 11:08

## 2022-02-28 RX ADMIN — PREGABALIN 50 MG: 50 CAPSULE ORAL at 20:14

## 2022-02-28 RX ADMIN — LEVOTHYROXINE SODIUM 112 MCG: 0.11 TABLET ORAL at 11:06

## 2022-02-28 RX ADMIN — CEFTRIAXONE 1 G: 1 INJECTION, POWDER, FOR SOLUTION INTRAMUSCULAR; INTRAVENOUS at 11:06

## 2022-02-28 RX ADMIN — POLYETHYLENE GLYCOL 3350 17 G: 17 POWDER, FOR SOLUTION ORAL at 11:05

## 2022-02-28 RX ADMIN — HYDROCODONE BITARTRATE AND ACETAMINOPHEN 1 TABLET: 5; 325 TABLET ORAL at 11:07

## 2022-02-28 RX ADMIN — HYDROCODONE BITARTRATE AND ACETAMINOPHEN 1 TABLET: 5; 325 TABLET ORAL at 02:57

## 2022-02-28 RX ADMIN — HYDROCODONE BITARTRATE AND ACETAMINOPHEN 1 TABLET: 5; 325 TABLET ORAL at 20:14

## 2022-02-28 RX ADMIN — PREGABALIN 50 MG: 50 CAPSULE ORAL at 11:06

## 2022-03-01 ENCOUNTER — APPOINTMENT (OUTPATIENT)
Dept: GENERAL RADIOLOGY | Facility: HOSPITAL | Age: 82
End: 2022-03-01

## 2022-03-01 LAB
ALBUMIN SERPL-MCNC: 2.7 G/DL (ref 3.5–5.2)
ALBUMIN/GLOB SERPL: 0.8 G/DL
ALP SERPL-CCNC: 122 U/L (ref 39–117)
ALT SERPL W P-5'-P-CCNC: 34 U/L (ref 1–33)
ANION GAP SERPL CALCULATED.3IONS-SCNC: 14.3 MMOL/L (ref 5–15)
AST SERPL-CCNC: 13 U/L (ref 1–32)
BACTERIA UR QL AUTO: ABNORMAL /HPF
BILIRUB SERPL-MCNC: 0.5 MG/DL (ref 0–1.2)
BILIRUB UR QL STRIP: NEGATIVE
BUN SERPL-MCNC: 16 MG/DL (ref 8–23)
BUN/CREAT SERPL: 15.2 (ref 7–25)
CALCIUM SPEC-SCNC: 8.5 MG/DL (ref 8.6–10.5)
CHLORIDE SERPL-SCNC: 104 MMOL/L (ref 98–107)
CLARITY UR: CLEAR
CO2 SERPL-SCNC: 17.7 MMOL/L (ref 22–29)
COLOR UR: YELLOW
CREAT SERPL-MCNC: 1.05 MG/DL (ref 0.57–1)
D-LACTATE SERPL-SCNC: 1.4 MMOL/L (ref 0.5–2)
DEPRECATED RDW RBC AUTO: 45.9 FL (ref 37–54)
EGFRCR SERPLBLD CKD-EPI 2021: 53.5 ML/MIN/1.73
ERYTHROCYTE [DISTWIDTH] IN BLOOD BY AUTOMATED COUNT: 14.1 % (ref 12.3–15.4)
GLOBULIN UR ELPH-MCNC: 3.3 GM/DL
GLUCOSE BLDC GLUCOMTR-MCNC: 142 MG/DL (ref 70–130)
GLUCOSE BLDC GLUCOMTR-MCNC: 206 MG/DL (ref 70–130)
GLUCOSE BLDC GLUCOMTR-MCNC: 212 MG/DL (ref 70–130)
GLUCOSE BLDC GLUCOMTR-MCNC: 227 MG/DL (ref 70–130)
GLUCOSE SERPL-MCNC: 234 MG/DL (ref 65–99)
GLUCOSE UR STRIP-MCNC: NEGATIVE MG/DL
HCT VFR BLD AUTO: 34.4 % (ref 34–46.6)
HGB BLD-MCNC: 11.1 G/DL (ref 12–15.9)
HGB UR QL STRIP.AUTO: ABNORMAL
HYALINE CASTS UR QL AUTO: ABNORMAL /LPF
KETONES UR QL STRIP: NEGATIVE
LEUKOCYTE ESTERASE UR QL STRIP.AUTO: ABNORMAL
MCH RBC QN AUTO: 28.8 PG (ref 26.6–33)
MCHC RBC AUTO-ENTMCNC: 32.3 G/DL (ref 31.5–35.7)
MCV RBC AUTO: 89.1 FL (ref 79–97)
MRSA DNA SPEC QL NAA+PROBE: NORMAL
NITRITE UR QL STRIP: NEGATIVE
PH UR STRIP.AUTO: 6 [PH] (ref 5–8)
PLATELET # BLD AUTO: 270 10*3/MM3 (ref 140–450)
PMV BLD AUTO: 10 FL (ref 6–12)
POTASSIUM SERPL-SCNC: 3.7 MMOL/L (ref 3.5–5.2)
PROCALCITONIN SERPL-MCNC: 0.79 NG/ML (ref 0–0.25)
PROT SERPL-MCNC: 6 G/DL (ref 6–8.5)
PROT UR QL STRIP: ABNORMAL
RBC # BLD AUTO: 3.86 10*6/MM3 (ref 3.77–5.28)
RBC # UR STRIP: ABNORMAL /HPF
REF LAB TEST METHOD: ABNORMAL
SODIUM SERPL-SCNC: 136 MMOL/L (ref 136–145)
SP GR UR STRIP: 1.01 (ref 1–1.03)
SQUAMOUS #/AREA URNS HPF: ABNORMAL /HPF
UROBILINOGEN UR QL STRIP: ABNORMAL
WBC # UR STRIP: ABNORMAL /HPF
WBC NRBC COR # BLD: 17.74 10*3/MM3 (ref 3.4–10.8)

## 2022-03-01 PROCEDURE — 80053 COMPREHEN METABOLIC PANEL: CPT | Performed by: NURSE PRACTITIONER

## 2022-03-01 PROCEDURE — 87641 MR-STAPH DNA AMP PROBE: CPT | Performed by: HOSPITALIST

## 2022-03-01 PROCEDURE — 83605 ASSAY OF LACTIC ACID: CPT | Performed by: HOSPITALIST

## 2022-03-01 PROCEDURE — 63710000001 INSULIN LISPRO (HUMAN) PER 5 UNITS: Performed by: INTERNAL MEDICINE

## 2022-03-01 PROCEDURE — 87040 BLOOD CULTURE FOR BACTERIA: CPT | Performed by: HOSPITALIST

## 2022-03-01 PROCEDURE — 71045 X-RAY EXAM CHEST 1 VIEW: CPT

## 2022-03-01 PROCEDURE — 81001 URINALYSIS AUTO W/SCOPE: CPT | Performed by: HOSPITALIST

## 2022-03-01 PROCEDURE — 84145 PROCALCITONIN (PCT): CPT | Performed by: HOSPITALIST

## 2022-03-01 PROCEDURE — 87493 C DIFF AMPLIFIED PROBE: CPT | Performed by: HOSPITALIST

## 2022-03-01 PROCEDURE — 85027 COMPLETE CBC AUTOMATED: CPT | Performed by: HOSPITALIST

## 2022-03-01 PROCEDURE — 82962 GLUCOSE BLOOD TEST: CPT

## 2022-03-01 RX ADMIN — LEVOTHYROXINE SODIUM 112 MCG: 0.11 TABLET ORAL at 09:13

## 2022-03-01 RX ADMIN — ACETAMINOPHEN 650 MG: 325 TABLET, FILM COATED ORAL at 04:11

## 2022-03-01 RX ADMIN — INSULIN LISPRO 3 UNITS: 100 INJECTION, SOLUTION INTRAVENOUS; SUBCUTANEOUS at 11:34

## 2022-03-01 RX ADMIN — Medication 10 ML: at 11:34

## 2022-03-01 RX ADMIN — INSULIN LISPRO 3 UNITS: 100 INJECTION, SOLUTION INTRAVENOUS; SUBCUTANEOUS at 16:32

## 2022-03-01 RX ADMIN — HYDROCODONE BITARTRATE AND ACETAMINOPHEN 1 TABLET: 5; 325 TABLET ORAL at 21:26

## 2022-03-01 RX ADMIN — PREGABALIN 50 MG: 50 CAPSULE ORAL at 09:13

## 2022-03-01 RX ADMIN — PANTOPRAZOLE SODIUM 40 MG: 40 TABLET, DELAYED RELEASE ORAL at 16:32

## 2022-03-01 RX ADMIN — CEFDINIR 300 MG: 300 CAPSULE ORAL at 21:30

## 2022-03-01 RX ADMIN — ACETAMINOPHEN 650 MG: 325 TABLET, FILM COATED ORAL at 21:27

## 2022-03-01 RX ADMIN — PREGABALIN 50 MG: 50 CAPSULE ORAL at 21:27

## 2022-03-01 RX ADMIN — ACETAMINOPHEN 650 MG: 325 TABLET, FILM COATED ORAL at 00:19

## 2022-03-01 RX ADMIN — PANTOPRAZOLE SODIUM 40 MG: 40 TABLET, DELAYED RELEASE ORAL at 06:08

## 2022-03-01 RX ADMIN — Medication 10 ML: at 21:27

## 2022-03-01 RX ADMIN — HYDROCODONE BITARTRATE AND ACETAMINOPHEN 1 TABLET: 5; 325 TABLET ORAL at 11:34

## 2022-03-01 RX ADMIN — LISINOPRIL 10 MG: 10 TABLET ORAL at 09:12

## 2022-03-01 RX ADMIN — PREGABALIN 50 MG: 50 CAPSULE ORAL at 15:25

## 2022-03-01 RX ADMIN — ACETAMINOPHEN 650 MG: 325 TABLET, FILM COATED ORAL at 15:25

## 2022-03-01 RX ADMIN — CEFDINIR 300 MG: 300 CAPSULE ORAL at 09:12

## 2022-03-01 RX ADMIN — INSULIN GLARGINE-YFGN 25 UNITS: 100 INJECTION, SOLUTION SUBCUTANEOUS at 21:28

## 2022-03-01 NOTE — PLAN OF CARE
Problem: Fall Injury Risk  Goal: Absence of Fall and Fall-Related Injury  Outcome: Ongoing, Progressing     Problem: Skin Injury Risk Increased  Goal: Skin Health and Integrity  Outcome: Ongoing, Progressing     Problem: Pain Chronic (Persistent) (Comorbidity Management)  Goal: Acceptable Pain Control and Functional Ability  Outcome: Ongoing, Progressing     Problem: Hypertension Comorbidity  Goal: Blood Pressure in Desired Range  Outcome: Ongoing, Progressing  Intervention: Maintain Hypertension-Management Strategies  Recent Flowsheet Documentation  Taken 2/28/2022 1956 by Nichole Pineda RN  Medication Review/Management: medications reviewed     Problem: Diabetes Comorbidity  Goal: Blood Glucose Level Within Desired Range  Outcome: Ongoing, Progressing   Goal Outcome Evaluation:           Progress: declining  Outcome Summary: Pt appeared to sleep fair, alert and oriented x4, coop with care, f/c draining without any problem, clear yellow urine, pain managed with oral meds, pt had elevated temp of 101.5 with chills, given tylenol and np notified, no new orders given, bp elevated but does not meet parameters to treat, will monitor.

## 2022-03-01 NOTE — PLAN OF CARE
Goal Outcome Evaluation:      >101 temp overnight and throughout today, pt warm to touch, treated with tylenol and ice packs. Gradual decrease to 99 degree temp. Couple loose BM's today and last night, miralax held this am. [facility] notified. Noted orders for BC, LA, CXR, UA, r/o cdiff, swab MRSA nares and consult ID. Otherwise, pt A&Ox4, RA, takes pills with no difficulty, SR-ST (low 100s), tyson with good urine output, prn norco for c/o generalized discomfort. Side rails x2. Call light within reach.         Problem: Adult Inpatient Plan of Care  Goal: Absence of Hospital-Acquired Illness or Injury  Outcome: Ongoing, Not Progressing  Intervention: Identify and Manage Fall Risk  Recent Flowsheet Documentation  Taken 3/1/2022 1800 by Kylah Felix RN  Safety Promotion/Fall Prevention:   toileting scheduled   safety round/check completed   room organization consistent  Taken 3/1/2022 1600 by Kylah Felix RN  Safety Promotion/Fall Prevention:   toileting scheduled   safety round/check completed   room organization consistent  Taken 3/1/2022 1400 by Kylah Felix RN  Safety Promotion/Fall Prevention:   toileting scheduled   room organization consistent   safety round/check completed  Taken 3/1/2022 1200 by Kylah Felix RN  Safety Promotion/Fall Prevention:   toileting scheduled   safety round/check completed   room organization consistent  Taken 3/1/2022 1000 by Kylah Felix RN  Safety Promotion/Fall Prevention:   toileting scheduled   safety round/check completed   room organization consistent  Taken 3/1/2022 0800 by Kylah Felix RN  Safety Promotion/Fall Prevention:   toileting scheduled   room organization consistent  Intervention: Prevent Skin Injury  Recent Flowsheet Documentation  Taken 3/1/2022 1800 by Kylah Felix RN  Body Position: supine  Taken 3/1/2022 1600 by Kylah Felix RN  Body Position: tilted, right  Taken 3/1/2022 1400 by Kylah Felix RN  Body Position:   supine   tilted,  left  Taken 3/1/2022 1200 by Kylah Felix RN  Body Position: supine  Taken 3/1/2022 1000 by Kylah Felix RN  Body Position: tilted, right  Taken 3/1/2022 0900 by Kylah Felix RN  Skin Protection: adhesive use limited  Taken 3/1/2022 0800 by Kylah Felix RN  Body Position: supine  Intervention: Prevent and Manage VTE (venous thromboembolism) Risk  Recent Flowsheet Documentation  Taken 3/1/2022 0900 by Kylah Felix RN  VTE Prevention/Management:   bilateral   dorsiflexion/plantar flexion performed   sequential compression devices off  Intervention: Prevent Infection  Recent Flowsheet Documentation  Taken 3/1/2022 1800 by Kylah Felix RN  Infection Prevention:   cohorting utilized   rest/sleep promoted   single patient room provided   personal protective equipment utilized   hand hygiene promoted   equipment surfaces disinfected   environmental surveillance performed  Taken 3/1/2022 1600 by Kylah Felix RN  Infection Prevention:   cohorting utilized   single patient room provided   personal protective equipment utilized   hand hygiene promoted   equipment surfaces disinfected   environmental surveillance performed   rest/sleep promoted   visitors restricted/screened  Taken 3/1/2022 1400 by Kylah Felix RN  Infection Prevention:   cohorting utilized   single patient room provided   rest/sleep promoted   personal protective equipment utilized   hand hygiene promoted   equipment surfaces disinfected   environmental surveillance performed   visitors restricted/screened  Taken 3/1/2022 1200 by Kylah Felix RN  Infection Prevention:   cohorting utilized   single patient room provided   visitors restricted/screened   rest/sleep promoted   hand hygiene promoted   equipment surfaces disinfected   environmental surveillance performed   personal protective equipment utilized  Taken 3/1/2022 1000 by Kylah Felix RN  Infection Prevention:   cohorting utilized   visitors restricted/screened    single patient room provided   personal protective equipment utilized   hand hygiene promoted   equipment surfaces disinfected   environmental surveillance performed   rest/sleep promoted  Taken 3/1/2022 0800 by Kylah Felix, RN  Infection Prevention:   cohorting utilized   single patient room provided   rest/sleep promoted   personal protective equipment utilized   hand hygiene promoted   equipment surfaces disinfected   environmental surveillance performed   visitors restricted/screened  Goal: Readiness for Transition of Care  Outcome: Ongoing, Not Progressing

## 2022-03-01 NOTE — PROGRESS NOTES
"DAILY PROGRESS NOTE  [facility]    Patient Identification:  Name: Halie Guidry  Age: 81 y.o.  Sex: female  :  1940  MRN: [id number]         Primary Care Physician: Napoleon Mead MD      Subjective  It appears patient has been febrile nearly all day.  Despite this she has not felt poorly.  No dysuria.  On questioning she has been having loose stools.  She states 3 days so far.  They are associated with some abdominal cramping.  She does have a history of chronic loose stools however and she is not entirely sure that there is a great deal of difference here.  No dysuria.  She still asking about going to rehab.    Objective:  General Appearance:  Comfortable, well-appearing, in no acute distress and not in pain.    Vital signs: (most recent): Blood pressure 157/68, pulse 96, temperature (!) 101.5 °F (38.6 °C), temperature source Oral, resp. rate 20, height 172.7 cm (67.99\"), weight 84 kg (185 lb 3 oz), SpO2 91 %, not currently breastfeeding.    Lungs:  Normal effort and normal respiratory rate.  Breath sounds clear to auscultation.    Heart: Normal rate.  Regular rhythm.    Abdomen: Abdomen is soft and non-distended.  Bowel sounds are normal.   There is no abdominal tenderness.     Extremities: There is no dependent edema.    Neurological: Patient is alert and oriented to person, place and time.    Skin:  Warm and dry.                Vital signs in last 24 hours:  Temp:  [97.5 °F (36.4 °C)-101.5 °F (38.6 °C)] 101.5 °F (38.6 °C)  Heart Rate:  [] 96  Resp:  [16-20] 20  BP: (135-210)/() 157/68    Intake/Output:    Intake/Output Summary (Last 24 hours) at 3/1/2022 1726  Last data filed at 3/1/2022 1446  Gross per 24 hour   Intake --   Output 2500 ml   Net -2500 ml         Results from last 7 days   Lab Units 22  0448 22  0336 22  0601 22  1014 22  0805 22  0125   WBC 10*3/mm3 17.74* 9.07 8.26 7.88 15.11* 14.00*   HEMOGLOBIN g/dL 11.1* 11.7* " 12.3 11.3* 13.7 14.5   PLATELETS 10*3/mm3 270 270 257 236 322 343     Results from last 7 days   Lab Units 03/01/22  0902 02/28/22  0734 02/27/22  1039 02/27/22  0328 02/26/22  0336 02/25/22  1935 02/25/22  0601 02/24/22  1014 02/23/22  0805 02/23/22  0805 02/23/22  0125 02/23/22  0125   SODIUM mmol/L 136 135* 138  --  139  --   --  138  --  138  --  134*   POTASSIUM mmol/L 3.7 4.2 4.1  --  3.9 4.4  --  3.4*  --  3.5   < > 3.4*   CHLORIDE mmol/L 104 104 105  --  107  --   --  102  --  93*  --  85*   CO2 mmol/L 17.7* 21.3* 18.2*  --  20.0*  --   --  27.0  --  30.0*  --  28.1   BUN mg/dL 16 26* 20  --  16  --   --  23  --  42*  --  43*   CREATININE mg/dL 1.05* 1.03* 1.07* 1.08* 0.98  --  0.93 1.04*   < > 1.29*   < > 1.42*   GLUCOSE mg/dL 234* 196* 199*  --  134*  --   --  174*  --  171*  --  167*    < > = values in this interval not displayed.   Estimated Creatinine Clearance: 47.7 mL/min (A) (by C-G formula based on SCr of 1.05 mg/dL (H)).  Results from last 7 days   Lab Units 03/01/22  0902 02/28/22  0734 02/27/22  1039 02/27/22 0328 02/26/22  0336 02/25/22  0601 02/24/22  1014 02/23/22  0805 02/23/22  0805 02/23/22  0125 02/23/22  0125   CALCIUM mg/dL 8.5* 8.3* 8.5*  --  8.2*  --  8.3*  --  9.0  --  9.9   ALBUMIN g/dL 2.70* 2.70* 3.10* 3.20* 3.10* 3.10* 3.20*   < > 4.00   < > 4.70    < > = values in this interval not displayed.     Results from last 7 days   Lab Units 03/01/22  0902 02/28/22  0734 02/27/22  1039 02/27/22  0328 02/26/22  0336 02/25/22  0601 02/24/22  1014   ALBUMIN g/dL 2.70* 2.70* 3.10* 3.20* 3.10* 3.10* 3.20*   BILIRUBIN mg/dL 0.5 0.3 0.3 0.3 0.4 0.5 0.6   ALK PHOS U/L 122* 118* 126* 116 127* 154* 160*   AST (SGOT) U/L 13 14 18 19 24 30 48*   ALT (SGPT) U/L 34* 44* 61* 60* 79* 119* 162*       Assessment:  UTI/emphysematous cystitis: Urine culture with Klebsiella pneumonia.  Good response to antibiotics.  IV Rocephin been switched over to oral Omnicef today.  Choledocholithiasis: Status post ERCP  with sphincterotomy and stone removal 2/25/2022.  Cholelithiasis: Follow-up with general surgery when COVID-19 point to complete.  Elevated LFTs: Likely secondary to biliary obstruction.  Improving.  COVID-19 positive: Patient has no respiratory complaints. Inflammatory parameters can all be explained based on her emphysematous cystitis. We will check a chest x-ray. If no typical COVID-19 infiltrates will discontinue remdesivir.  Diabetes type 2: Blood sugars running high. Patient was on high-dose insulin prior to admission. We will add Lantus to her regime.  Baig catheter: Follow-up with urology for outpatient voiding trial.  Fever-recurrent: Associated with return of significant leukocytosis.  Etiology uncertain.  Abdominal exam unremarkable.  Chest x-ray with no acute changes.  Urinalysis pending as well as procalcitonin level and lactic acid.  Blood cultures ordered.  Will check stools for C. Difficile.  Abdominal exam benign this time around.  We will request input from infectious disease.    Plan:  Please see above.  Discussed with RN.    Raulito Disla MD  3/1/2022  17:26 EST    Addendum:  Urinalysis unremarkable.  Awaiting stool studies.  Electronically signed by Raulito Disla MD, 03/01/22, 8:16 PM EST.

## 2022-03-01 NOTE — CASE MANAGEMENT/SOCIAL WORK
Continued Stay Note  [facility]     Patient Name: Halie Guidry  MRN: [id number]  Today's Date: 3/1/2022    Admit Date: 2/23/2022     Discharge Plan     Row Name 03/01/22 1351       Plan    Plan [facility]    Plan Comments Per Petey they can accept and my have a bed tomorrow. She will update CCP when she knows for sure. Kristyn Ricardo RN CCP               Discharge Codes    No documentation.               Expected Discharge Date and Time     Expected Discharge Date Expected Discharge Time    Mar 3, 2022             Kristyn Ricardo RN

## 2022-03-02 LAB
ALBUMIN SERPL-MCNC: 2.8 G/DL (ref 3.5–5.2)
ALBUMIN/GLOB SERPL: 1 G/DL
ALP SERPL-CCNC: 109 U/L (ref 39–117)
ALT SERPL W P-5'-P-CCNC: 25 U/L (ref 1–33)
ANION GAP SERPL CALCULATED.3IONS-SCNC: 10.5 MMOL/L (ref 5–15)
AST SERPL-CCNC: 8 U/L (ref 1–32)
BASOPHILS # BLD AUTO: 0.06 10*3/MM3 (ref 0–0.2)
BASOPHILS NFR BLD AUTO: 0.4 % (ref 0–1.5)
BILIRUB SERPL-MCNC: 0.4 MG/DL (ref 0–1.2)
BUN SERPL-MCNC: 17 MG/DL (ref 8–23)
BUN/CREAT SERPL: 17 (ref 7–25)
C DIFF TOX GENS STL QL NAA+PROBE: POSITIVE
CALCIUM SPEC-SCNC: 8 MG/DL (ref 8.6–10.5)
CHLORIDE SERPL-SCNC: 104 MMOL/L (ref 98–107)
CO2 SERPL-SCNC: 20.5 MMOL/L (ref 22–29)
CREAT SERPL-MCNC: 1 MG/DL (ref 0.57–1)
DEPRECATED RDW RBC AUTO: 43 FL (ref 37–54)
EGFRCR SERPLBLD CKD-EPI 2021: 56.7 ML/MIN/1.73
EOSINOPHIL # BLD AUTO: 0.33 10*3/MM3 (ref 0–0.4)
EOSINOPHIL NFR BLD AUTO: 2.3 % (ref 0.3–6.2)
ERYTHROCYTE [DISTWIDTH] IN BLOOD BY AUTOMATED COUNT: 13.9 % (ref 12.3–15.4)
GLOBULIN UR ELPH-MCNC: 2.8 GM/DL
GLUCOSE BLDC GLUCOMTR-MCNC: 258 MG/DL (ref 70–130)
GLUCOSE BLDC GLUCOMTR-MCNC: 260 MG/DL (ref 70–130)
GLUCOSE BLDC GLUCOMTR-MCNC: 267 MG/DL (ref 70–130)
GLUCOSE BLDC GLUCOMTR-MCNC: 312 MG/DL (ref 70–130)
GLUCOSE SERPL-MCNC: 259 MG/DL (ref 65–99)
HCT VFR BLD AUTO: 30.9 % (ref 34–46.6)
HGB BLD-MCNC: 10.3 G/DL (ref 12–15.9)
IMM GRANULOCYTES # BLD AUTO: 0.1 10*3/MM3 (ref 0–0.05)
IMM GRANULOCYTES NFR BLD AUTO: 0.7 % (ref 0–0.5)
LYMPHOCYTES # BLD AUTO: 1.73 10*3/MM3 (ref 0.7–3.1)
LYMPHOCYTES NFR BLD AUTO: 11.8 % (ref 19.6–45.3)
MCH RBC QN AUTO: 28.5 PG (ref 26.6–33)
MCHC RBC AUTO-ENTMCNC: 33.3 G/DL (ref 31.5–35.7)
MCV RBC AUTO: 85.6 FL (ref 79–97)
MONOCYTES # BLD AUTO: 0.86 10*3/MM3 (ref 0.1–0.9)
MONOCYTES NFR BLD AUTO: 5.9 % (ref 5–12)
NEUTROPHILS NFR BLD AUTO: 11.52 10*3/MM3 (ref 1.7–7)
NEUTROPHILS NFR BLD AUTO: 78.9 % (ref 42.7–76)
NRBC BLD AUTO-RTO: 0 /100 WBC (ref 0–0.2)
PLATELET # BLD AUTO: 252 10*3/MM3 (ref 140–450)
PMV BLD AUTO: 10.1 FL (ref 6–12)
POTASSIUM SERPL-SCNC: 3.2 MMOL/L (ref 3.5–5.2)
POTASSIUM SERPL-SCNC: 4.9 MMOL/L (ref 3.5–5.2)
PROCALCITONIN SERPL-MCNC: 0.74 NG/ML (ref 0–0.25)
PROT SERPL-MCNC: 5.6 G/DL (ref 6–8.5)
RBC # BLD AUTO: 3.61 10*6/MM3 (ref 3.77–5.28)
SODIUM SERPL-SCNC: 135 MMOL/L (ref 136–145)
WBC NRBC COR # BLD: 14.6 10*3/MM3 (ref 3.4–10.8)

## 2022-03-02 PROCEDURE — 84145 PROCALCITONIN (PCT): CPT | Performed by: INTERNAL MEDICINE

## 2022-03-02 PROCEDURE — 97110 THERAPEUTIC EXERCISES: CPT

## 2022-03-02 PROCEDURE — 99222 1ST HOSP IP/OBS MODERATE 55: CPT | Performed by: STUDENT IN AN ORGANIZED HEALTH CARE EDUCATION/TRAINING PROGRAM

## 2022-03-02 PROCEDURE — 85025 COMPLETE CBC W/AUTO DIFF WBC: CPT | Performed by: HOSPITALIST

## 2022-03-02 PROCEDURE — 84132 ASSAY OF SERUM POTASSIUM: CPT | Performed by: INTERNAL MEDICINE

## 2022-03-02 PROCEDURE — 63710000001 INSULIN LISPRO (HUMAN) PER 5 UNITS: Performed by: INTERNAL MEDICINE

## 2022-03-02 PROCEDURE — 80053 COMPREHEN METABOLIC PANEL: CPT | Performed by: NURSE PRACTITIONER

## 2022-03-02 PROCEDURE — 97530 THERAPEUTIC ACTIVITIES: CPT

## 2022-03-02 PROCEDURE — 82962 GLUCOSE BLOOD TEST: CPT

## 2022-03-02 PROCEDURE — 25010000002 ENOXAPARIN PER 10 MG: Performed by: INTERNAL MEDICINE

## 2022-03-02 RX ORDER — INSULIN LISPRO 100 [IU]/ML
0-14 INJECTION, SOLUTION INTRAVENOUS; SUBCUTANEOUS
Status: DISCONTINUED | OUTPATIENT
Start: 2022-03-02 | End: 2022-03-04 | Stop reason: HOSPADM

## 2022-03-02 RX ORDER — CHOLESTYRAMINE 4 G/9G
1 POWDER, FOR SUSPENSION ORAL EVERY 12 HOURS SCHEDULED
Status: DISCONTINUED | OUTPATIENT
Start: 2022-03-02 | End: 2022-03-04 | Stop reason: HOSPADM

## 2022-03-02 RX ORDER — VANCOMYCIN HYDROCHLORIDE 125 MG/1
125 CAPSULE ORAL EVERY 6 HOURS SCHEDULED
Status: DISCONTINUED | OUTPATIENT
Start: 2022-03-02 | End: 2022-03-04 | Stop reason: HOSPADM

## 2022-03-02 RX ORDER — FAMOTIDINE 20 MG/1
20 TABLET, FILM COATED ORAL
Status: DISCONTINUED | OUTPATIENT
Start: 2022-03-02 | End: 2022-03-04 | Stop reason: HOSPADM

## 2022-03-02 RX ADMIN — HYDROCODONE BITARTRATE AND ACETAMINOPHEN 1 TABLET: 5; 325 TABLET ORAL at 21:32

## 2022-03-02 RX ADMIN — PREGABALIN 50 MG: 50 CAPSULE ORAL at 16:24

## 2022-03-02 RX ADMIN — CHOLESTYRAMINE 1 PACKET: 4 POWDER, FOR SUSPENSION ORAL at 21:02

## 2022-03-02 RX ADMIN — FAMOTIDINE 20 MG: 20 TABLET ORAL at 16:48

## 2022-03-02 RX ADMIN — Medication 10 ML: at 20:03

## 2022-03-02 RX ADMIN — VANCOMYCIN HYDROCHLORIDE 125 MG: 125 CAPSULE ORAL at 05:47

## 2022-03-02 RX ADMIN — VANCOMYCIN HYDROCHLORIDE 125 MG: 125 CAPSULE ORAL at 11:37

## 2022-03-02 RX ADMIN — VANCOMYCIN HYDROCHLORIDE 125 MG: 125 CAPSULE ORAL at 23:55

## 2022-03-02 RX ADMIN — LEVOTHYROXINE SODIUM 112 MCG: 0.11 TABLET ORAL at 08:52

## 2022-03-02 RX ADMIN — VANCOMYCIN HYDROCHLORIDE 125 MG: 125 CAPSULE ORAL at 18:32

## 2022-03-02 RX ADMIN — CEFDINIR 300 MG: 300 CAPSULE ORAL at 08:52

## 2022-03-02 RX ADMIN — CEFDINIR 300 MG: 300 CAPSULE ORAL at 20:02

## 2022-03-02 RX ADMIN — INSULIN LISPRO 8 UNITS: 100 INJECTION, SOLUTION INTRAVENOUS; SUBCUTANEOUS at 16:48

## 2022-03-02 RX ADMIN — INSULIN GLARGINE-YFGN 23 UNITS: 100 INJECTION, SOLUTION SUBCUTANEOUS at 21:37

## 2022-03-02 RX ADMIN — PREGABALIN 50 MG: 50 CAPSULE ORAL at 23:55

## 2022-03-02 RX ADMIN — ACETAMINOPHEN 650 MG: 325 TABLET, FILM COATED ORAL at 16:48

## 2022-03-02 RX ADMIN — POTASSIUM CHLORIDE 40 MEQ: 1.5 POWDER, FOR SOLUTION ORAL at 10:47

## 2022-03-02 RX ADMIN — INSULIN LISPRO 4 UNITS: 100 INJECTION, SOLUTION INTRAVENOUS; SUBCUTANEOUS at 11:36

## 2022-03-02 RX ADMIN — HYDROCODONE BITARTRATE AND ACETAMINOPHEN 1 TABLET: 5; 325 TABLET ORAL at 13:39

## 2022-03-02 RX ADMIN — LISINOPRIL 10 MG: 10 TABLET ORAL at 08:52

## 2022-03-02 RX ADMIN — PREGABALIN 50 MG: 50 CAPSULE ORAL at 08:51

## 2022-03-02 RX ADMIN — POTASSIUM CHLORIDE 40 MEQ: 1.5 POWDER, FOR SOLUTION ORAL at 13:39

## 2022-03-02 RX ADMIN — Medication 10 ML: at 08:52

## 2022-03-02 RX ADMIN — INSULIN LISPRO 4 UNITS: 100 INJECTION, SOLUTION INTRAVENOUS; SUBCUTANEOUS at 08:51

## 2022-03-02 RX ADMIN — HYDROCODONE BITARTRATE AND ACETAMINOPHEN 1 TABLET: 5; 325 TABLET ORAL at 05:42

## 2022-03-02 RX ADMIN — CHOLESTYRAMINE 1 PACKET: 4 POWDER, FOR SUSPENSION ORAL at 16:49

## 2022-03-02 RX ADMIN — ENOXAPARIN SODIUM 40 MG: 100 INJECTION SUBCUTANEOUS at 13:40

## 2022-03-02 NOTE — CONSULTS
Referring Provider: Kelechi Sanchez MD  Reason for Consultation: Fever and leukocytosis  Chief Complaint   Patient presents with   • Vomiting         Subjective   History of present illness: Patient is an 81-year-old female who presented with abdominal pain, nausea and vomiting and found to have emphysematous cystitis on abdominal imaging as well as elevated transaminases with choledocholithiasis status post ERCP.  Patient responded well to these therapies including antibiotics for emphysematous cystitis however now developed gross cytosis and fever and ID was consulted for further recommendations.    On presentation patient had imaging that was concerning for both emphysematous cystitis and choledocholithiasis.  Urology and GI were consulted with ERCP done in the setting of choledocholithiasis as well as Rocephin started for urine culture growing Klebsiella covering emphysematous cystitis.  Repeat imaging was done that showed improvement in her emphysematous cystitis on 2/27.  She underwent ERCP with sphincterotomy and stone extraction on 2/25 with good result.  She then developed new fever and leukocytosis the morning of 3/1.  Leukocytosis peaked at 17 and blood cultures were obtained as well as a chest x-ray which was unremarkable.  Procalcitonin was also elevated at 0.74.  Lactate was normal.  C. difficile testing returned positive and she was started on oral vancomycin.    Patient reports she is doing better this morning.  She is currently concerned about repeat Covid testing to get into a nursing facility as she does not think she can go home with a urinary catheter.  She states she continues to have diarrhea with innumerable number of movements as she is unable to know when she can stop having them.  She denies any further fevers or chills.  States she tolerated the oral vancomycin well.  Denies any shortness of breath or cough.  States she has some abdominal discomfort.  No nausea or vomiting.    Past  Medical History:   Diagnosis Date   • Anxiety    • Arthritis    • Benign essential hypertension 08/20/2014   • 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HCC)    • Depression    • Diabetes (HCC)    • Diabetes mellitus, type 2 (HCC)    • Disc degeneration, lumbar    • Headache, tension-type    • Hyperlipidemia    • Hypothyroidism    • Neuropathy    • Osteoporosis 09/09/2015   • Peripheral neuropathy    • Rotator cuff tear, left    • Scoliosis    • Shoulder pain     LEFT, TORN ROTATOR CUFF S/P FALL   • Spinal stenosis        Past Surgical History:   Procedure Laterality Date   • APPENDECTOMY     • BILATERAL BREAST REDUCTION Bilateral 08/2015   • CATARACT EXTRACTION  03/2015   • COLONOSCOPY  06/05/2015    WNL   • ERCP N/A 2/25/2022    Procedure: ENDOSCOPIC RETROGRADE CHOLANGIOPANCREATOGRAPHY with sphincterotomy and balloon sweep;  Surgeon: Chilo Wilhelm MD;  Location: [facility] ENDOSCOPY;  Service: Gastroenterology;  Laterality: N/A;  PRE/POST - CBD stones   • KYPHOPLASTY     • REDUCTION MAMMAPLASTY     • TONSILLECTOMY         family history includes Bone cancer in her mother; No Known Problems in her father.     reports that she quit smoking about 9 years ago. She smoked 1.00 pack per day. She has never used smokeless tobacco. She reports that she does not drink alcohol and does not use drugs.     Allergies   Allergen Reactions   • Sulfa Antibiotics        Medication:  Antibiotics:  Anti-Infectives (From admission, onward)    Ordered     Dose/Rate Route Frequency Start Stop    03/02/22 0215  vancomycin (VANCOCIN) capsule 125 mg        Ordering Provider: Meri Koch APRN    125 mg Oral Every 6 Hours Scheduled 03/02/22 0600 03/12/22 0559    02/28/22 1517  cefdinir (OMNICEF) capsule 300 mg        Ordering Provider: Raulito Disla MD    300 mg Oral Every 12 Hours Scheduled 03/01/22 0900 03/09/22 0859    02/24/22 0958  remdesivir 100 mg in sodium chloride 0.9 % 250 mL IVPB (powder vial)        Ordering Provider: Melonie  "Chilo BAKER MD   \"Followed by\" Linked Group Details    100 mg  over 60 Minutes Intravenous Every 24 Hours 02/25/22 1100 02/28/22 1851    02/24/22 0958  remdesivir 200 mg in sodium chloride 0.9 % 290 mL IVPB (powder vial)        Ordering Provider: Peter Sanz APRN   \"Followed by\" Linked Group Details    200 mg  over 60 Minutes Intravenous Every 24 Hours 02/24/22 1100 02/24/22 1527    02/23/22 0454  cefTRIAXone (ROCEPHIN) 2 g in sodium chloride 0.9 % 100 mL IVPB-VTB        Ordering Provider: Randy Small III PA    2 g  200 mL/hr over 30 Minutes Intravenous Once 02/23/22 0455 02/23/22 0835          Review of Systems  Pertinent items are noted in HPI, all other systems reviewed and negative    Objective     Physical Exam:   Vital Signs   Temp:  [96.8 °F (36 °C)-101.5 °F (38.6 °C)] 96.8 °F (36 °C)  Heart Rate:  [76-96] 77  Resp:  [18-20] 18  BP: (129-176)/(61-93) 176/76    GENERAL: Awake and alert, in no acute distress.   HEENT: Oropharynx is clear. Hearing is grossly normal.   EYES: PERRL. No conjunctival injection. No lid lag.   LYMPHATICS: No lymphadenopathy of the neck or inguinal regions.   HEART: Regular rate and regular rhythm. No peripheral edema.   LUNGS: Clear to auscultation anteriorly with normal respiratory effort.   GI: Soft, minor tenderness, nondistended. No appreciable organomegaly.   SKIN: Warm and dry without cutaneous eruptions   PSYCHIATRIC: Appropriate mood, affect, insight, and judgment.     Results Review:   I reviewed the patient's new clinical results.  I reviewed the patient's new imaging results and agree with the interpretation.  I reviewed the patient's other test results and agree with the interpretation    Lab Results   Component Value Date    WBC 14.60 (H) 03/02/2022    HGB 10.3 (L) 03/02/2022    HCT 30.9 (L) 03/02/2022    MCV 85.6 03/02/2022     03/02/2022       No results found for: CLEMENTINA REULAS VANCORANDOM    Lab Results   Component Value Date    " GLUCOSE 259 (H) 03/02/2022    BUN 17 03/02/2022    CREATININE 1.00 03/02/2022    EGFRIFNONA 51 (L) 02/28/2022    EGFRIFAFRI 50 (L) 01/18/2021    BCR 17.0 03/02/2022    CO2 20.5 (L) 03/02/2022    CALCIUM 8.0 (L) 03/02/2022    PROTENTOTREF 7.3 01/18/2021    ALBUMIN 2.80 (L) 03/02/2022    LABIL2 1.2 01/18/2021    AST 8 03/02/2022    ALT 25 03/02/2022         Estimated Creatinine Clearance: 50.1 mL/min (by C-G formula based on SCr of 1 mg/dL).      Microbiology:  2/23 Covid positive  2/23 urine culture greater than 100,000 Klebsiella pneumonia  2/23 blood cultures no growth to date  3/1 MRSA naris negative  3/1 blood cultures no growth to date  3/1 C. difficile toxin PCR positive    Radiology:  2/23 CT abdomen and pelvis report reviewed with extensive air within the wall urinary bladder characteristic of emphysematous cystitis.  No definitive evidence of extension to the upper urinary tract.  Possible esophagitis.  Cholelithiasis and choledocholithiasis with distention of the gallbladder and prominent common bile duct.    2/27 CT of the abdomen and pelvis report reviewed with considerable improvement in emphysematous cystitis.  Remaining gallstones.    2/28 chest x-ray with no evidence of pneumonia or consolidation.    3/1 chest x-ray with mild linear atelectasis in the left base and atelectasis versus infiltrate in the right lung base.    Assessment/Plan   #Emphysematous cystitis with Klebsiella pneumonia  #Choledocholithiasis status post ERCP and stone removal  #C. difficile colitis  #COVID-19 infection  #Type 2 diabetes complicating the above     Suspect at this point the patient's new fever and leukocytosis is related to C. difficile given her new positive test in the setting of diarrhea.  Agree with starting vancomycin oral 125 mg 4 times daily for 10-day course.  This should do nicely to overlap with her planned course for emphysematous cystitis as well as continue for 2 days past the outlined 14-day course of  cefdinir to finish on the eighth.    Imaging from the 27th showed good improvement in emphysematous cystitis and the above course should be sufficient.    Thank you for allowing me to be involved in the care of this patient. Infectious diseases will sign off at this time with antibiotics plan in place, but please call me at [phone] if any further ID questions or new ID concerns.

## 2022-03-02 NOTE — NURSING NOTE
Call out to [facility] regarding stool sample results from lab.     Spoke with Meri Koch

## 2022-03-02 NOTE — CASE MANAGEMENT/SOCIAL WORK
Continued Stay Note  [facility]     Patient Name: Halie Guidry  MRN: [id number]  Today's Date: 3/2/2022    Admit Date: 2/23/2022     Discharge Plan     Row Name 03/02/22 1317       Plan    Plan [facility], pre-cert pending    Plan Comments Spoke with Petey and they have started pre-cert. Message sent to PT so that patietn can be seen today for updated PT notes for the pre-cert. Partial packet in CCP office. Kristyn Ricardo RN CCP               Discharge Codes    No documentation.               Expected Discharge Date and Time     Expected Discharge Date Expected Discharge Time    Mar 3, 2022             Kristyn Ricardo RN

## 2022-03-02 NOTE — PLAN OF CARE
Goal Outcome Evaluation:  Plan of Care Reviewed With: patient        Progress: no change  Outcome Summary: A&O x4; pt running temp this shift; tylenol given. SR on telemetry; room air. Stool sample collected = (+) for C-diff; [facility] notified. F/C still in place per urology orders. will continue to monitor

## 2022-03-02 NOTE — PROGRESS NOTES
[facility] Clinical Pharmacy Services: C. Difficile Medication Changes       Pharmacy has been consulted to look over Halie Guidry's profile to check patient's medications for changes due to C. Difficile diagnosis per SULEMA Slaughter's request.       Current C. Diff Regimen:     Assessment/Plan/Changes       1. Proton pump inhibitor: Protonix 40mg BID before meals, No history of GI bleeding documented, will change to Famotidine 20mg q24h adjusted for Patient CrCl 47.7 ml/min   2. Antiperistalic agents or stool softeners/laxatives: NO      Thank you for allowing me to participate in your patient's care.  Please call pharmacy with any questions or concerns.     Reggie Shin [facility]  Clinical Pharmacist

## 2022-03-02 NOTE — PLAN OF CARE
Goal Outcome Evaluation:  Plan of Care Reviewed With: patient        Progress: improving  Outcome Summary: Pt agreeable to PT this AM. Pt tested positive for C-diff and reports being frustrated by the freqency of BM and that she doesn't feel the urge to go soon enough to warn nsg. Pt had a BM at beginning of PT session, nsg assisted with clean up but pt able to independently roll multiple times to each side. Cued for bedrail use and pt able to complete without assist. Pt requires min A x1 and rwx use for STS from bed and from recliner chair. Pt completed 10 reps standing marches, but fatigues quickly and reclined further ambulation in the room. Pt able to take a few steps to the chair with min A for balance, but demonstrated overall improved standing balance. Pt will continue to benefit from skilled PT to address functional deficits. PT still recommending D/C to SNF.    Patient was not wearing a face mask during this therapy encounter. Therapist used appropriate personal protective equipment including gown, eye protection, mask and gloves.  Mask used was N95/duckbill. Appropriate PPE was worn during the entire therapy session. Hand hygiene was completed before and after therapy session. Patient is in enhanced droplet precautions.

## 2022-03-02 NOTE — PROGRESS NOTES
Name: Halie Guidry ADMIT: 2022   : 1940  PCP: Napoleon Mead MD    MRN: [id number] LOS: 5 days   AGE/SEX: 81 y.o. female  ROOM: [facility]   Subjective   Chief Complaint   Patient presents with   • Vomiting     On ABX  +cdiff  To start on oral vanc  Has tyson  -267    ROS  No f/c  No n/v  No cp/palp  No soa/cough    Objective   Vital Signs  Temp:  [96.8 °F (36 °C)-98.8 °F (37.1 °C)] 96.8 °F (36 °C)  Heart Rate:  [76-80] 77  Resp:  [18-20] 18  BP: (129-176)/(61-93) 176/76  SpO2:  [95 %-100 %] 96 %  on   ;   Device (Oxygen Therapy): room air  Body mass index is 28.16 kg/m².    Physical Exam  Constitutional:       General: She is not in acute distress.     Appearance: She is ill-appearing (chronically).   HENT:      Head: Normocephalic and atraumatic.   Eyes:      General: No scleral icterus.  Cardiovascular:      Rate and Rhythm: Regular rhythm.      Heart sounds: Normal heart sounds.   Pulmonary:      Effort: Pulmonary effort is normal. No respiratory distress.   Abdominal:      General: There is no distension.      Palpations: Abdomen is soft.   Musculoskeletal:      Cervical back: Neck supple.   Neurological:      Mental Status: She is alert.   Psychiatric:         Behavior: Behavior normal.     +jah    Results Review:       I reviewed the patient's new clinical results.  Results from last 7 days   Lab Units 22  0529 22  0448 22  0336 22  0601   WBC 10*3/mm3 14.60* 17.74* 9.07 8.26   HEMOGLOBIN g/dL 10.3* 11.1* 11.7* 12.3   PLATELETS 10*3/mm3 252 270 270 257     Results from last 7 days   Lab Units 22  0529 22  0902 22  0734 22  1039   SODIUM mmol/L 135* 136 135* 138   POTASSIUM mmol/L 3.2* 3.7 4.2 4.1   CHLORIDE mmol/L 104 104 104 105   CO2 mmol/L 20.5* 17.7* 21.3* 18.2*   BUN mg/dL 17 16 26* 20   CREATININE mg/dL 1.00 1.05* 1.03* 1.07*   GLUCOSE mg/dL 259* 234* 196* 199*   Estimated Creatinine Clearance: 50.1 mL/min (by C-G formula based  on SCr of 1 mg/dL).  Results from last 7 days   Lab Units 03/02/22  0529 03/01/22  0902 02/28/22  0734 02/27/22  1039   ALBUMIN g/dL 2.80* 2.70* 2.70* 3.10*   BILIRUBIN mg/dL 0.4 0.5 0.3 0.3   ALK PHOS U/L 109 122* 118* 126*   AST (SGOT) U/L 8 13 14 18   ALT (SGPT) U/L 25 34* 44* 61*     Results from last 7 days   Lab Units 03/02/22  0529 03/01/22  0902 02/28/22  0734 02/27/22  1039   CALCIUM mg/dL 8.0* 8.5* 8.3* 8.5*   ALBUMIN g/dL 2.80* 2.70* 2.70* 3.10*     Results from last 7 days   Lab Units 03/02/22  0529 03/01/22 2214 03/01/22 2213 02/28/22  0734 02/26/22  0336   PROCALCITONIN ng/mL 0.74* 0.79*  --  0.11 0.23   LACTATE mmol/L  --   --  1.4  --   --        Coag     HbA1C   Lab Results   Component Value Date    HGBA1C 7.70 (H) 02/24/2022    HGBA1C 10.90 (H) 12/02/2021    HGBA1C 7.20 (H) 06/08/2021     Infection   Results from last 7 days   Lab Units 03/02/22 0529 03/01/22 2214 02/28/22  0734 02/26/22  0336 02/24/22  0432   PROCALCITONIN ng/mL 0.74* 0.79* 0.11 0.23 0.53*     Radiology(recent) XR Chest 1 View    Result Date: 3/1/2022  1. Mild linear atelectasis or scarring in the left lung base. There may be some minimal atelectasis or possible minimal developing pneumonia in the right lung base.  This report was finalized on 3/1/2022 4:43 PM by Dr. Mario Alberto Moura M.D.      No results found for: TROPONINT, TROPONINI, BNP  No components found for: TSH;2    cefdinir, 300 mg, Oral, Q12H  cholestyramine, 1 packet, Oral, Q12H  famotidine, 20 mg, Oral, Daily Before Supper  insulin glargine, 25 Units, Subcutaneous, Nightly  insulin lispro, 0-7 Units, Subcutaneous, TID AC  levothyroxine, 112 mcg, Oral, Daily  lisinopril, 10 mg, Oral, Daily  pregabalin, 50 mg, Oral, TID  sodium chloride, 10 mL, Intravenous, Q12H  vancomycin, 125 mg, Oral, Q6H      Pharmacy Consult,     Diet Regular; Cardiac, Consistent Carbohydrate      Assessment/Plan      Active Hospital Problems    Diagnosis  POA   • **Lower abdominal pain  [R10.30]  Yes   • Hypokalemia [E87.6]  Unknown   • Hypoxia [R09.02]  Unknown   • Transaminitis [R74.01]  Yes   • COVID-19 virus detected [U07.1]  Yes   • Acute UTI (urinary tract infection) [N39.0]  Yes   • 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       UTI/emphysematous cystitis: Urine culture with Klebsiella pneumonia.  Good response to antibiotics.  IV Rocephin been switched over to oral Omnicef .  Choledocholithiasis: Status post ERCP with sphincterotomy and stone removal 2/25/2022.  Cholelithiasis: Follow-up with general surgery when COVID-19 and cdiff improved.   Elevated LFTs: Likely secondary to biliary obstruction.  Improving.  COVID-19 positive: Patient has no respiratory complaints. Inflammatory parameters can all be explained based on her emphysematous cystitis  Diabetes type 2: Blood sugars running high. Patient was on high-dose insulin prior to admission.  Lantus added to her regime. Increase SSI  Baig catheter: Follow-up with urology for outpatient voiding trial.  Cdiff colitis- plans on 10 days of PO vanc. Add a couple doses of cholestyramine to help symptoms.       AKOSUA RN    Dispo- to SNF soon, will have outpatient follow up with Urology and General Surgery.     Aman Vaughn MD  [facility]  03/02/22  13:15 EST

## 2022-03-02 NOTE — PLAN OF CARE
Goal Outcome Evaluation:         VSS. Alert & Oriented x4, RA, SR, tyson, BM today, pt received norco for c/o generalized pain. Potassium treated per protocol. Up in chair without difficulty, call light within reach.       Problem: Diabetes Comorbidity  Goal: Blood Glucose Level Within Desired Range  Outcome: Ongoing, Not Progressing  Intervention: Maintain Glycemic Control  Recent Flowsheet Documentation  Taken 3/2/2022 0830 by Kylah Felix RN  Glycemic Management: blood glucose monitoring        Problem: Adult Inpatient Plan of Care  Goal: Absence of Hospital-Acquired Illness or Injury  Outcome: Ongoing, Progressing  Intervention: Identify and Manage Fall Risk  Recent Flowsheet Documentation  Taken 3/2/2022 1800 by Kylah Felix RN  Safety Promotion/Fall Prevention:   safety round/check completed   room organization consistent   toileting scheduled  Taken 3/2/2022 1600 by Kylah Felix RN  Safety Promotion/Fall Prevention:   toileting scheduled   safety round/check completed   room organization consistent   nonskid shoes/slippers when out of bed   lighting adjusted   fall prevention program maintained   clutter free environment maintained   assistive device/personal items within reach  Taken 3/2/2022 1400 by Kylah Felix, RN  Safety Promotion/Fall Prevention:   toileting scheduled   safety round/check completed   room organization consistent  Taken 3/2/2022 1200 by Kylah Felix RN  Safety Promotion/Fall Prevention:   toileting scheduled   safety round/check completed   room organization consistent   nonskid shoes/slippers when out of bed   lighting adjusted   clutter free environment maintained   assistive device/personal items within reach   fall prevention program maintained  Taken 3/2/2022 1000 by Kylah Felix RN  Safety Promotion/Fall Prevention:   safety round/check completed   room organization consistent   toileting scheduled   lighting adjusted   clutter free environment maintained    assistive device/personal items within reach  Taken 3/2/2022 0830 by Kylah Felix RN  Safety Promotion/Fall Prevention:   toileting scheduled   safety round/check completed   room organization consistent   clutter free environment maintained   fall prevention program maintained   nonskid shoes/slippers when out of bed  Taken 3/2/2022 0800 by Kylah Felix RN  Safety Promotion/Fall Prevention:   toileting scheduled   safety round/check completed   room organization consistent  Intervention: Prevent Skin Injury  Recent Flowsheet Documentation  Taken 3/2/2022 1800 by Kylah Felix RN  Body Position: (up in chair) --  Taken 3/2/2022 1600 by Kylah Felix RN  Body Position: (up in chair) --  Skin Protection:   adhesive use limited   tubing/devices free from skin contact   skin-to-skin areas padded  Taken 3/2/2022 1400 by Kylah Felix RN  Body Position: (up in chair) --  Taken 3/2/2022 1200 by Kylah Felix RN  Body Position: (sitting up in chair) --  Taken 3/2/2022 1000 by Kylah Felix RN  Body Position:   supine   tilted, left  Taken 3/2/2022 0830 by Kylah Felix RN  Skin Protection:   adhesive use limited   tubing/devices free from skin contact  Intervention: Prevent and Manage VTE (venous thromboembolism) Risk  Recent Flowsheet Documentation  Taken 3/2/2022 1600 by Kylah Felix RN  VTE Prevention/Management: (lovenox) --  Taken 3/2/2022 0830 by Kylah Felix RN  VTE Prevention/Management: patient refused intervention  Intervention: Prevent Infection  Recent Flowsheet Documentation  Taken 3/2/2022 1800 by Kylah Felix RN  Infection Prevention:   cohorting utilized   single patient room provided   rest/sleep promoted   personal protective equipment utilized   hand hygiene promoted   equipment surfaces disinfected   environmental surveillance performed  Taken 3/2/2022 1600 by Kylah Felix RN  Infection Prevention:   cohorting utilized   visitors restricted/screened   single  patient room provided   rest/sleep promoted   hand hygiene promoted   equipment surfaces disinfected   environmental surveillance performed   personal protective equipment utilized  Taken 3/2/2022 1400 by Kylah Felix RN  Infection Prevention:   cohorting utilized   single patient room provided   rest/sleep promoted   hand hygiene promoted   equipment surfaces disinfected   environmental surveillance performed   personal protective equipment utilized  Taken 3/2/2022 1200 by Kylah Felix RN  Infection Prevention:   cohorting utilized   single patient room provided   visitors restricted/screened   rest/sleep promoted   personal protective equipment utilized   equipment surfaces disinfected   environmental surveillance performed   hand hygiene promoted  Taken 3/2/2022 1000 by Kylah Felix RN  Infection Prevention:   cohorting utilized   single patient room provided   rest/sleep promoted   personal protective equipment utilized   hand hygiene promoted   equipment surfaces disinfected   environmental surveillance performed  Taken 3/2/2022 0800 by Kylah Felix RN  Infection Prevention:   cohorting utilized   single patient room provided   personal protective equipment utilized   hand hygiene promoted   equipment surfaces disinfected   environmental surveillance performed   rest/sleep promoted  Goal: Optimal Comfort and Wellbeing  Outcome: Ongoing, Progressing  Intervention: Provide Person-Centered Care  Recent Flowsheet Documentation  Taken 3/2/2022 1600 by Kylah Felix RN  Trust Relationship/Rapport:   care explained   reassurance provided   thoughts/feelings acknowledged   questions encouraged   questions answered   empathic listening provided   emotional support provided   choices provided  Taken 3/2/2022 0830 by Kylah Felix RN  Trust Relationship/Rapport:   care explained   thoughts/feelings acknowledged   reassurance provided   questions encouraged   choices provided   emotional support  provided   empathic listening provided   questions answered  Goal: Readiness for Transition of Care  Outcome: Ongoing, Progressing     Problem: Hypertension Comorbidity  Goal: Blood Pressure in Desired Range  Outcome: Ongoing, Progressing  Intervention: Maintain Hypertension-Management Strategies  Recent Flowsheet Documentation  Taken 3/2/2022 1800 by Kylah Felix RN  Medication Review/Management: medications reviewed  Taken 3/2/2022 1600 by Kylah Felix RN  Medication Review/Management: medications reviewed  Taken 3/2/2022 1400 by Kylah Felix RN  Medication Review/Management: medications reviewed  Taken 3/2/2022 1200 by Kylah Felix RN  Medication Review/Management: medications reviewed  Taken 3/2/2022 1000 by Kylah Felix RN  Medication Review/Management: medications reviewed  Taken 3/2/2022 0830 by Kylah Felix RN  Medication Review/Management: medications reviewed  Taken 3/2/2022 0800 by Kylah Felix RN  Medication Review/Management: medications reviewed     Problem: Pain Chronic (Persistent) (Comorbidity Management)  Goal: Acceptable Pain Control and Functional Ability  Outcome: Ongoing, Progressing  Intervention: Develop Pain Management Plan  Recent Flowsheet Documentation  Taken 3/2/2022 1600 by Kylah Felix RN  Pain Management Interventions:   care clustered   quiet environment facilitated   position adjusted  Taken 3/2/2022 0830 by Kylah Felix RN  Pain Management Interventions:   care clustered   pillow support provided   quiet environment facilitated  Intervention: Manage Persistent Pain  Recent Flowsheet Documentation  Taken 3/2/2022 1800 by Kylah Felix RN  Medication Review/Management: medications reviewed  Taken 3/2/2022 1600 by Kylah Felix RN  Medication Review/Management: medications reviewed  Taken 3/2/2022 1400 by Kylah Felix RN  Medication Review/Management: medications reviewed  Taken 3/2/2022 1200 by Kylah Felix, PAUL  Medication  Review/Management: medications reviewed  Taken 3/2/2022 1000 by Kylah Felix RN  Medication Review/Management: medications reviewed  Taken 3/2/2022 0830 by Kylah Felix RN  Sleep/Rest Enhancement: awakenings minimized  Medication Review/Management: medications reviewed  Taken 3/2/2022 0800 by Kylah Felix RN  Medication Review/Management: medications reviewed  Intervention: Optimize Psychosocial Wellbeing  Recent Flowsheet Documentation  Taken 3/2/2022 1600 by Kylah Felix RN  Supportive Measures: active listening utilized  Diversional Activities: television  Family/Support System Care:   support provided   self-care encouraged  Taken 3/2/2022 0830 by Kylah Felix RN  Supportive Measures: active listening utilized  Diversional Activities: television  Family/Support System Care:   self-care encouraged   support provided     Problem: Skin Injury Risk Increased  Goal: Skin Health and Integrity  Outcome: Ongoing, Progressing  Intervention: Optimize Skin Protection  Recent Flowsheet Documentation  Taken 3/2/2022 1800 by Kylah Felix RN  Head of Bed (HOB): HOB at 60-90 degrees  Taken 3/2/2022 1600 by Kylah Felix RN  Pressure Reduction Techniques: frequent weight shift encouraged  Head of Bed (HOB): HOB at 60-90 degrees  Pressure Reduction Devices: alternating pressure pump (ADD)  Skin Protection:   adhesive use limited   tubing/devices free from skin contact   skin-to-skin areas padded  Taken 3/2/2022 1400 by Kylah Felix RN  Head of Bed (HOB): HOB at 60-90 degrees  Taken 3/2/2022 1200 by Kylah Felix RN  Head of Bed (HOB): HOB at 60-90 degrees  Taken 3/2/2022 1000 by Kylah Felix RN  Head of Bed (HOB): HOB at 60-90 degrees  Taken 3/2/2022 0830 by Kylah Felix RN  Pressure Reduction Techniques:   frequent weight shift encouraged   heels elevated off bed  Pressure Reduction Devices: alternating pressure pump (ADD)  Skin Protection:   adhesive use limited   tubing/devices free  from skin contact     Problem: Fall Injury Risk  Goal: Absence of Fall and Fall-Related Injury  Outcome: Ongoing, Progressing  Intervention: Identify and Manage Contributors to Fall Injury Risk  Recent Flowsheet Documentation  Taken 3/2/2022 1800 by Kylah Felix RN  Medication Review/Management: medications reviewed  Taken 3/2/2022 1600 by Kylah Felix RN  Medication Review/Management: medications reviewed  Taken 3/2/2022 1400 by Kylah Felix RN  Medication Review/Management: medications reviewed  Taken 3/2/2022 1200 by Kylah Felix RN  Medication Review/Management: medications reviewed  Taken 3/2/2022 1000 by Kylah Felix RN  Medication Review/Management: medications reviewed  Taken 3/2/2022 0830 by Kylah Felix RN  Medication Review/Management: medications reviewed  Taken 3/2/2022 0800 by Kylah Felix RN  Medication Review/Management: medications reviewed  Intervention: Promote Injury-Free Environment  Recent Flowsheet Documentation  Taken 3/2/2022 1800 by Kylah Felix RN  Safety Promotion/Fall Prevention:   safety round/check completed   room organization consistent   toileting scheduled  Taken 3/2/2022 1600 by Kylah Felix RN  Safety Promotion/Fall Prevention:   toileting scheduled   safety round/check completed   room organization consistent   nonskid shoes/slippers when out of bed   lighting adjusted   fall prevention program maintained   clutter free environment maintained   assistive device/personal items within reach  Taken 3/2/2022 1400 by Kylah Felix RN  Safety Promotion/Fall Prevention:   toileting scheduled   safety round/check completed   room organization consistent  Taken 3/2/2022 1200 by Kylah Felix RN  Safety Promotion/Fall Prevention:   toileting scheduled   safety round/check completed   room organization consistent   nonskid shoes/slippers when out of bed   lighting adjusted   clutter free environment maintained   assistive device/personal items  within reach   fall prevention program maintained  Taken 3/2/2022 1000 by Kylah Felix, RN  Safety Promotion/Fall Prevention:   safety round/check completed   room organization consistent   toileting scheduled   lighting adjusted   clutter free environment maintained   assistive device/personal items within reach  Taken 3/2/2022 0830 by Kylah Felix, RN  Safety Promotion/Fall Prevention:   toileting scheduled   safety round/check completed   room organization consistent   clutter free environment maintained   fall prevention program maintained   nonskid shoes/slippers when out of bed  Taken 3/2/2022 0800 by Kylah Felix, RN  Safety Promotion/Fall Prevention:   toileting scheduled   safety round/check completed   room organization consistent

## 2022-03-02 NOTE — THERAPY TREATMENT NOTE
Patient Name: Halie Guidry  : 1940    MRN: [id number]                              Today's Date: 3/2/2022       Admit Date: 2022    Visit Dx:     ICD-10-CM ICD-9-CM   1. Lower abdominal pain  R10.30 789.09   2. Acute UTI  N39.0 599.0   3. Dehydration  E86.0 276.51   4. Emphysematous cystitis  N30.80 595.89   5. COVID-19  U07.1 079.89   6. Choledocholithiasis  K80.50 574.50     Patient Active Problem List   Diagnosis   • Compression fracture   • Lumbar degenerative disc disease   • Type 2 diabetes mellitus, with long-term current use of insulin ([facility])   • Hyperlipidemia   • Benign essential hypertension   • Primary hypothyroidism   • Leukocytosis   • Neuropathy   • Osteoporosis   • Proteinuria   • Tobacco abuse   • Diabetic eye exam ([facility])   • Generalized weakness   • Spinal stenosis   • Scoliosis   • Arthritis   • VBI (vertebrobasilar insufficiency)   • Orthostatic hypotension   • 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 ([facility])   • Severe hypothyroidism   • Noncompliance   • Vitamin D deficiency disease   • Altered mental status   • Tremor   • Seizure ([facility])   • Stage 3b chronic kidney disease ([facility])   • Thoracic degenerative disc disease   • Metabolic encephalopathy   • VIPUL (acute kidney injury) ([facility])   • Hyperglycemia   • Type II diabetes mellitus, uncontrolled ([facility])   • Lower abdominal pain   • Transaminitis   • COVID-19 virus detected   • Acute UTI (urinary tract infection)   • Emphysematous cystitis   • Choledocholithiasis   • Hypokalemia   • Hypoxia     Past Medical History:   Diagnosis Date   • Anxiety    • Arthritis    • Benign essential hypertension 2014   • 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[facility])    • Depression    • Diabetes ([facility])    • Diabetes mellitus, type 2 ([facility])    • Disc degeneration, lumbar    • Headache, tension-type    • Hyperlipidemia    • Hypothyroidism    • Neuropathy    • Osteoporosis 2015   • Peripheral neuropathy    • Rotator cuff tear, left    • Scoliosis    •  Shoulder pain     LEFT, TORN ROTATOR CUFF S/P FALL   • Spinal stenosis      Past Surgical History:   Procedure Laterality Date   • APPENDECTOMY     • BILATERAL BREAST REDUCTION Bilateral 08/2015   • CATARACT EXTRACTION  03/2015   • COLONOSCOPY  06/05/2015    WNL   • ERCP N/A 2/25/2022    Procedure: ENDOSCOPIC RETROGRADE CHOLANGIOPANCREATOGRAPHY with sphincterotomy and balloon sweep;  Surgeon: Chilo Wilhelm MD;  Location: [facility] ENDOSCOPY;  Service: Gastroenterology;  Laterality: N/A;  PRE/POST - CBD stones   • KYPHOPLASTY     • REDUCTION MAMMAPLASTY     • TONSILLECTOMY        General Information     Row Name 03/02/22 1319          Physical Therapy Time and Intention    Document Type therapy note (daily note)  -     Mode of Treatment individual therapy; physical therapy  -     Row Name 03/02/22 1319          General Information    Patient Profile Reviewed yes  -     Existing Precautions/Restrictions fall  -     Row Name 03/02/22 1319          Cognition    Orientation Status (Cognition) oriented x 3  -     Row Name 03/02/22 1319          Safety Issues, Functional Mobility    Impairments Affecting Function (Mobility) balance; endurance/activity tolerance; strength; shortness of breath  -           User Key  (r) = Recorded By, (t) = Taken By, (c) = Cosigned By    Initials Name Provider Type     Alia Owusu Physical Therapist               Mobility     Row Name 03/02/22 1319          Bed Mobility    Bed Mobility supine-sit; sit-supine; rolling left; rolling right  -     Rolling Left [place] (Bed Mobility) modified independence; verbal cues  -     Rolling Right [place] (Bed Mobility) modified independence; verbal cues  -     Supine-Sit [place] (Bed Mobility) standby assist; verbal cues  -     Sit-Supine [place] (Bed Mobility) not tested  UIC  -     Assistive Device (Bed Mobility) bed rails; head of bed elevated  -     Comment (Bed Mobility) Cues for reaching for  bedrails, but able to roll independently to get cleaned up following a BM at beginning of session  -     Row Name 03/02/22 1319          Transfers    Comment (Transfers) STS from bed 1x and from chair 2x - all with min A and rwx  -     Row Name 03/02/22 1319          Sit-Stand Transfer    Sit-Stand [place] (Transfers) minimum assist (75% patient effort); verbal cues; nonverbal cues (demo/gesture)  -     Assistive Device (Sit-Stand Transfers) walker, front-wheeled  -     Row Name 03/02/22 1319          Gait/Stairs (Locomotion)    [place] Level (Gait) minimum assist (75% patient effort); verbal cues  -     Assistive Device (Gait) walker, front-wheeled  -     Distance in Feet (Gait) 5ft to chair  -     Deviations/Abnormal Patterns (Gait) tess decreased; festinating/shuffling; gait speed decreased; stride length decreased  -     Bilateral Gait Deviations forward flexed posture  -     [place] Level (Stairs) not tested  -     Comment (Gait/Stairs) Tolerated gait well, distance limited 2/2 fatigue, decline further ambulation in room  -           User Key  (r) = Recorded By, (t) = Taken By, (c) = Cosigned By    Initials Name Provider Type     Alia Owusu Physical Therapist               Obj/Interventions     [facility] Name 03/02/22 1321          Motor Skills    Therapeutic Exercise --  10 reps B AP/LAQ + 10 reps standing marches  -           User Key  (r) = Recorded By, (t) = Taken By, (c) = Cosigned By    Initials Name Provider Type     Alia Owusu Physical Therapist               Goals/Plan    No documentation.                Clinical Impression     [facility] Name 03/02/22 1322          Pain Scale: Numbers Pre/Post-Treatment    Pretreatment Pain Rating 0/10 - no pain  -     Posttreatment Pain Rating 0/10 - no pain  -     Row Name 03/02/22 1322          Plan of Care Review    Plan of Care Reviewed With patient  -     Progress improving  -     Outcome Summary Pt agreeable to  PT this AM. Pt tested positive for C-diff and reports being frustrated by the freqency of BM and that she doesn't feel the urge to go soon enough to warn nsg. Pt had a BM at beginning of PT session, nsg assisted with clean up but pt able to independently roll multiple times to each side. Cued for bedrail use and pt able to complete without assist. Pt requires min A x1 and rwx use for STS from bed and from recliner chair. Pt completed 10 reps standing marches, but fatigues quickly and reclined further ambulation in the room. Pt able to take a few steps to the chair with min A for balance, but demonstrated overall improved standing balance. Pt will continue to benefit from skilled PT to address functional deficits. PT still recommending D/C to SNF.  -     Row Name 03/02/22 1320          Therapy Assessment/Plan (PT)    Patient/Family Therapy Goals Statement (PT) Return to PLOF  -     Row Name 03/02/22 1322          Positioning and Restraints    Pre-Treatment Position in bed  -     Post Treatment Position chair  -     In Chair reclined; call light within reach; encouraged to call for assist; exit alarm on  -           User Key  (r) = Recorded By, (t) = Taken By, (c) = Cosigned By    Initials Name Provider Type    Alia Yan Physical Therapist               Outcome Measures     Row Name 03/02/22 1322          How much help from another person do you currently need...    Turning from your back to your side while in flat bed without using bedrails? 3  -BH     Moving from lying on back to sitting on the side of a flat bed without bedrails? 3  -BH     Moving to and from a bed to a chair (including a wheelchair)? 3  -BH     Standing up from a chair using your arms (e.g., wheelchair, bedside chair)? 3  -BH     Climbing 3-5 steps with a railing? 2  -BH     To walk in hospital room? 2  -BH     AM-PAC 6 Clicks Score (PT) 16  -     Row Name 03/02/22 1329          Functional Assessment    Outcome Measure Options  AM-PAC 6 Clicks Basic Mobility (PT)  -           User Key  (r) = Recorded By, (t) = Taken By, (c) = Cosigned By    Initials Name Provider Type     Alia Owusu Physical Therapist                             Physical Therapy Education                 Title: PT OT SLP Therapies (Done)     Topic: Physical Therapy (Done)     Point: Mobility training (Done)     Learning Progress Summary           Patient Acceptance, E,TB,D, VU,NR by  at 3/2/2022 1327    Acceptance, E, VU by  at 2/26/2022 1122    Acceptance, E, VU,NR by  at 2/24/2022 1408    Acceptance, E, VU by JN at 2/23/2022 2321    Acceptance, E,TB, VU by CF at 2/23/2022 1539                   Point: Home exercise program (Done)     Learning Progress Summary           Patient Acceptance, E,TB,D, VU,NR by  at 3/2/2022 1327    Acceptance, E, VU by DB at 2/26/2022 1122    Acceptance, E, VU by AIME at 2/23/2022 2321    Acceptance, E,TB, VU by  at 2/23/2022 1539                   Point: Body mechanics (Done)     Learning Progress Summary           Patient Acceptance, E,TB,D, VU,NR by  at 3/2/2022 1327    Acceptance, E, VU by  at 2/26/2022 1122    Acceptance, E, VU,NR by  at 2/24/2022 1408    Acceptance, E, VU by JN at 2/23/2022 2321    Acceptance, E,TB, VU by  at 2/23/2022 1539                   Point: Precautions (Done)     Learning Progress Summary           Patient Acceptance, E,TB,D, VU,NR by  at 3/2/2022 1327    Acceptance, E, VU by  at 2/26/2022 1122    Acceptance, E, VU,NR by DJ at 2/24/2022 1408    Acceptance, E, VU by JN at 2/23/2022 2321    Acceptance, E,TB, VU by  at 2/23/2022 1539                               User Key     Initials Effective Dates Name Provider Type Discipline    DB 06/16/21 -  Dannielle Harrison, PT Physical Therapist PT    RUSTY 10/25/19 -  Malissa Murphy, PT Physical Therapist PT    CF 06/16/21 -  Alejandro Almonte, RN Registered Nurse Nurse     05/10/21 -  Alia Owusu Physical Therapist PT    JN 09/22/21 -   Bryant Coppola, RN Registered Nurse Nurse              PT Recommendation and Plan     Plan of Care Reviewed With: patient  Progress: improving  Outcome Summary: Pt agreeable to PT this AM. Pt tested positive for C-diff and reports being frustrated by the freqency of BM and that she doesn't feel the urge to go soon enough to warn nsg. Pt had a BM at beginning of PT session, nsg assisted with clean up but pt able to independently roll multiple times to each side. Cued for bedrail use and pt able to complete without assist. Pt requires min A x1 and rwx use for STS from bed and from recliner chair. Pt completed 10 reps standing marches, but fatigues quickly and reclined further ambulation in the room. Pt able to take a few steps to the chair with min A for balance, but demonstrated overall improved standing balance. Pt will continue to benefit from skilled PT to address functional deficits. PT still recommending D/C to SNF.     Time Calculation:    PT Charges     Row Name 03/02/22 1327             Time Calculation    Start Time 1045  -      Stop Time 1117  -      Time Calculation (min) 32 min  -      PT Received On 03/02/22  -      PT - Next Appointment 03/03/22  -              Time Calculation- PT    Total Timed Code Minutes- PT 32 minute(s)  -              Timed Charges    21262 - PT Therapeutic Exercise Minutes 8  -      09049 - Gait Training Minutes  5  -      96806 - PT Therapeutic Activity Minutes 19  -              Total Minutes    Timed Charges Total Minutes 32  -       Total Minutes 32  -            User Key  (r) = Recorded By, (t) = Taken By, (c) = Cosigned By    Initials Name Provider Type     Alia Owusu Physical Therapist              Therapy Charges for Today     Code Description Service Date Service Provider Modifiers Qty    [id number]  PT THER PROC EA 15 MIN 3/2/2022 Alia Owusu GP 1    [id number] HC PT THERAPEUTIC ACT EA 15 MIN 3/2/2022 Alia Owusu GP 1          PT  G-Codes  Outcome Measure Options: AM-PAC 6 Clicks Basic Mobility (PT)  AM-PAC 6 Clicks Score (PT): 16    SALENA HAILE  3/2/2022

## 2022-03-02 NOTE — DISCHARGE PLACEMENT REQUEST
"Tabatha Guidry (81 y.o. Female)             Date of Birth Social Security Number Address Home Phone MRN    1940  [id number] \[facility]\""  [address] [place] [address] [phone] [id number]    Jainism Marital Status             Unknown        Admission Date Admission Type Admitting Provider Attending Provider Department, Room/Bed    2/23/22 Emergency Kain Morgan MD Jackson, Alan David, MD [address], [address]    Discharge Date Discharge Disposition Discharge Destination                         Attending Provider: Aman Vaughn MD    Allergies: Sulfa Antibiotics    Isolation: Enh Drop/Con, Spore   Infection: COVID (confirmed) (02/23/22), C.difficile (03/02/22)   Code Status: CPR   Advance Care Planning Activity    Ht: 172.7 cm (67.99\")   Wt: 84 kg (185 lb 3 oz)    Admission Cmt: None   Principal Problem: Lower abdominal pain [R10.30]                 Active Insurance as of 2/23/2022     Primary Coverage     Payor Plan Insurance Group Employer/Plan Group    [facility] MEDICARE REPLACEMENT [facility] MEDICARE REPLACEMENT [id number]     Payor Plan Address Payor Plan Phone Number Payor Plan Fax Number Effective Dates    [address] [address]   1/1/2016 - None Entered    [facility] [id number]       Subscriber Name Subscriber Birth Date Member ID       TABATHA GUIDRY 1940 [id number]                 Emergency Contacts      (Rel.) Home Phone Work Phone Mobile Phone    Derrick Guidry (Son) -- -- [phone]    Josse Guidry (Son) -- -- [phone]            {Outbreak/Travel/Exposure Documentation......;  Question Available Choices Patient Response   COVID-19 Outbreak Screen:  Do you currently have a new onset of the following symptoms?        Fever/Chills, Cough, Shortness of air, Loss of taste or smell, No, Unknown  (!) Fever/Chills; Cough; Shortness of Air; Loss of taste or smell (02/23/22 1058)   COVID-19 Outbreak Screen: In the last 14 " days, have you had contact with anyone who is ill, has show any of the symptoms listed above and/or has been diagnosis with the 2019 Novel Coronavirus? This includes any immediate household members but excludes any patients with whom you have been in contact within your normal work duties wearing proper PPE, if you are a healthcare worker.  Yes, No, Unknown              No (02/23/22 1058)   COVID-19 Outbreak Screen: Who was notified? Free text (not recorded)   Ebola Screening Outbreak Screen: Have you traveled to the [place] the [place] or [place] within the past 21 days?  Yes, No, Unknown (not recorded)   Ebola Screening Outbreak Screen: Do you have ANY of the following symptoms: Fever/Chills, Vomiting, Diarrhea, Fatigue, Headache, Muscle pain, Unexplained bleeding, Abdominal (stomach) pain, No, Unknown (not recorded)   Ebola Screening Outbreak Screen: Name of Person notified Free text (not recorded)   Travel Screen: Have you traveled in the last month? If so, to what country have you traveled? If [place] what state? Yes, No, Unknown  List of all countries  List of all States No (02/23/22 1101)  (not recorded)  (not recorded)   Infection Risk: Do you currently have the following symptoms?  (If cough is selected, the Tuberculosis Screen is performed.) Cough, Fever, Rash, No (!) Cough (02/23/22 1101)   Tuberculosis Screen: Do you have any of the following Tuberculosis Risks?  · Have you lived or spent time with anyone who had or may have TB?  · Have you lived in or visited any of the following areas for more than one month: [place], [place], [place], [place] or [place], the [place] or [place]?  · Do you have HIV/AIDS?  · Have you lived in or worked in a nursing home, homeless shelter, correctional facility, or substance abuse treatment facility?   · No    If Yes do you have any of the following symptoms? Yes responses display to the right    If Yes, symptoms listed are:  Cough greater than or equal  to 3 weeks, Loss of appetite, Unexplained weight loss, Night sweats, Bloody sputum or hemoptysis, Hoarseness, Fever, Fatigue, Chest pain, No No (02/23/22 1101)  (not recorded)   Exposure Screen: Have you been exposed to any of these contagious diseases in the last month? Measles, Chickenpox, Meningitis, Pertussis, Whooping Cough, No No (02/23/22 1101)

## 2022-03-03 LAB
ALBUMIN SERPL-MCNC: 2.9 G/DL (ref 3.5–5.2)
ALBUMIN/GLOB SERPL: 1 G/DL
ALP SERPL-CCNC: 99 U/L (ref 39–117)
ALT SERPL W P-5'-P-CCNC: 19 U/L (ref 1–33)
ANION GAP SERPL CALCULATED.3IONS-SCNC: 13 MMOL/L (ref 5–15)
AST SERPL-CCNC: 8 U/L (ref 1–32)
BILIRUB SERPL-MCNC: 0.2 MG/DL (ref 0–1.2)
BUN SERPL-MCNC: 14 MG/DL (ref 8–23)
BUN/CREAT SERPL: 15.6 (ref 7–25)
CALCIUM SPEC-SCNC: 8.1 MG/DL (ref 8.6–10.5)
CHLORIDE SERPL-SCNC: 106 MMOL/L (ref 98–107)
CO2 SERPL-SCNC: 18 MMOL/L (ref 22–29)
CREAT SERPL-MCNC: 0.9 MG/DL (ref 0.57–1)
EGFRCR SERPLBLD CKD-EPI 2021: 64.4 ML/MIN/1.73
GLOBULIN UR ELPH-MCNC: 2.8 GM/DL
GLUCOSE BLDC GLUCOMTR-MCNC: 172 MG/DL (ref 70–130)
GLUCOSE BLDC GLUCOMTR-MCNC: 262 MG/DL (ref 70–130)
GLUCOSE BLDC GLUCOMTR-MCNC: 264 MG/DL (ref 70–130)
GLUCOSE BLDC GLUCOMTR-MCNC: 293 MG/DL (ref 70–130)
GLUCOSE SERPL-MCNC: 232 MG/DL (ref 65–99)
POTASSIUM SERPL-SCNC: 4.4 MMOL/L (ref 3.5–5.2)
PROT SERPL-MCNC: 5.7 G/DL (ref 6–8.5)
SODIUM SERPL-SCNC: 137 MMOL/L (ref 136–145)

## 2022-03-03 PROCEDURE — 80053 COMPREHEN METABOLIC PANEL: CPT | Performed by: NURSE PRACTITIONER

## 2022-03-03 PROCEDURE — 25010000002 ENOXAPARIN PER 10 MG: Performed by: INTERNAL MEDICINE

## 2022-03-03 PROCEDURE — 63710000001 INSULIN LISPRO (HUMAN) PER 5 UNITS: Performed by: INTERNAL MEDICINE

## 2022-03-03 PROCEDURE — 82962 GLUCOSE BLOOD TEST: CPT

## 2022-03-03 RX ADMIN — CEFDINIR 300 MG: 300 CAPSULE ORAL at 21:36

## 2022-03-03 RX ADMIN — CEFDINIR 300 MG: 300 CAPSULE ORAL at 08:10

## 2022-03-03 RX ADMIN — INSULIN LISPRO 8 UNITS: 100 INJECTION, SOLUTION INTRAVENOUS; SUBCUTANEOUS at 08:12

## 2022-03-03 RX ADMIN — INSULIN LISPRO 8 UNITS: 100 INJECTION, SOLUTION INTRAVENOUS; SUBCUTANEOUS at 12:03

## 2022-03-03 RX ADMIN — CHOLESTYRAMINE 1 PACKET: 4 POWDER, FOR SUSPENSION ORAL at 08:12

## 2022-03-03 RX ADMIN — FAMOTIDINE 20 MG: 20 TABLET ORAL at 17:22

## 2022-03-03 RX ADMIN — VANCOMYCIN HYDROCHLORIDE 125 MG: 125 CAPSULE ORAL at 05:16

## 2022-03-03 RX ADMIN — INSULIN LISPRO 3 UNITS: 100 INJECTION, SOLUTION INTRAVENOUS; SUBCUTANEOUS at 17:19

## 2022-03-03 RX ADMIN — BENZOCAINE AND MENTHOL 1 LOZENGE: 15; 3.6 LOZENGE ORAL at 00:08

## 2022-03-03 RX ADMIN — Medication 10 ML: at 08:14

## 2022-03-03 RX ADMIN — LISINOPRIL 10 MG: 10 TABLET ORAL at 08:10

## 2022-03-03 RX ADMIN — HYDROCODONE BITARTRATE AND ACETAMINOPHEN 1 TABLET: 5; 325 TABLET ORAL at 16:29

## 2022-03-03 RX ADMIN — VANCOMYCIN HYDROCHLORIDE 125 MG: 125 CAPSULE ORAL at 12:25

## 2022-03-03 RX ADMIN — CHOLESTYRAMINE 1 PACKET: 4 POWDER, FOR SUSPENSION ORAL at 20:40

## 2022-03-03 RX ADMIN — Medication 10 ML: at 21:36

## 2022-03-03 RX ADMIN — VANCOMYCIN HYDROCHLORIDE 125 MG: 125 CAPSULE ORAL at 17:19

## 2022-03-03 RX ADMIN — PREGABALIN 50 MG: 50 CAPSULE ORAL at 15:43

## 2022-03-03 RX ADMIN — HYDROCODONE BITARTRATE AND ACETAMINOPHEN 1 TABLET: 5; 325 TABLET ORAL at 05:16

## 2022-03-03 RX ADMIN — PREGABALIN 50 MG: 50 CAPSULE ORAL at 08:10

## 2022-03-03 RX ADMIN — LEVOTHYROXINE SODIUM 112 MCG: 0.11 TABLET ORAL at 08:10

## 2022-03-03 RX ADMIN — ENOXAPARIN SODIUM 40 MG: 100 INJECTION SUBCUTANEOUS at 14:18

## 2022-03-03 RX ADMIN — INSULIN GLARGINE-YFGN 26 UNITS: 100 INJECTION, SOLUTION SUBCUTANEOUS at 21:38

## 2022-03-03 NOTE — PLAN OF CARE
Goal Outcome Evaluation:  Plan of Care Reviewed With: patient        Progress: no change  Outcome Summary: A & O x4. Makes needs known. Patient sitting up in chair. F/C in place and draining clear yellow urine at bedside. No c/o pain. No s/s of distress noted.

## 2022-03-03 NOTE — PROGRESS NOTES
Name: Halie Guidry ADMIT: 2022   : 1940  PCP: Napoleon Mead MD    MRN: [id number] LOS: 6 days   AGE/SEX: 81 y.o. female  ROOM: [facility]   Subjective   Chief Complaint   Patient presents with   • Vomiting     On ABX  +cdiff   on oral vanc  Diarrhea getting better  Has tyson  -312    ROS  No f/c  No n/v  No cp/palp  No soa/cough    Objective   Vital Signs  Temp:  [97.8 °F (36.6 °C)-98.1 °F (36.7 °C)] 97.8 °F (36.6 °C)  Heart Rate:  [69-73] 69  Resp:  [18] 18  BP: (149-179)/(69-79) 179/79  SpO2:  [93 %-95 %] 95 %  on   ;   Device (Oxygen Therapy): room air  Body mass index is 28.16 kg/m².    Physical Exam  Constitutional:       General: She is not in acute distress.     Appearance: She is ill-appearing (chronically).   HENT:      Head: Normocephalic and atraumatic.   Eyes:      General: No scleral icterus.  Cardiovascular:      Rate and Rhythm: Regular rhythm.      Heart sounds: Normal heart sounds.   Pulmonary:      Effort: Pulmonary effort is normal. No respiratory distress.   Abdominal:      General: There is no distension.      Palpations: Abdomen is soft.   Musculoskeletal:      Cervical back: Neck supple.   Neurological:      Mental Status: She is alert.   Psychiatric:         Behavior: Behavior normal.     +jah    Results Review:       I reviewed the patient's new clinical results.  Results from last 7 days   Lab Units 22  0529 22  0448 22  0336 22  0601   WBC 10*3/mm3 14.60* 17.74* 9.07 8.26   HEMOGLOBIN g/dL 10.3* 11.1* 11.7* 12.3   PLATELETS 10*3/mm3 252 270 270 257     Results from last 7 days   Lab Units 22  0342 22  1811 22  0529 22  0902 22  0734 22  0734   SODIUM mmol/L 137  --  135* 136  --  135*   POTASSIUM mmol/L 4.4 4.9 3.2* 3.7   < > 4.2   CHLORIDE mmol/L 106  --  104 104  --  104   CO2 mmol/L 18.0*  --  20.5* 17.7*  --  21.3*   BUN mg/dL 14  --  17 16  --  26*   CREATININE mg/dL 0.90  --  1.00 1.05*  --   1.03*   GLUCOSE mg/dL 232*  --  259* 234*  --  196*    < > = values in this interval not displayed.   Estimated Creatinine Clearance: 55.6 mL/min (by C-G formula based on SCr of 0.9 mg/dL).  Results from last 7 days   Lab Units 03/03/22 0342 03/02/22  0529 03/01/22  0902 02/28/22  0734   ALBUMIN g/dL 2.90* 2.80* 2.70* 2.70*   BILIRUBIN mg/dL 0.2 0.4 0.5 0.3   ALK PHOS U/L 99 109 122* 118*   AST (SGOT) U/L 8 8 13 14   ALT (SGPT) U/L 19 25 34* 44*     Results from last 7 days   Lab Units 03/03/22 0342 03/02/22 0529 03/01/22 0902 02/28/22  0734   CALCIUM mg/dL 8.1* 8.0* 8.5* 8.3*   ALBUMIN g/dL 2.90* 2.80* 2.70* 2.70*     Results from last 7 days   Lab Units 03/02/22  0529 03/01/22 2214 03/01/22 2213 02/28/22  0734 02/26/22  0336   PROCALCITONIN ng/mL 0.74* 0.79*  --  0.11 0.23   LACTATE mmol/L  --   --  1.4  --   --        Coag     HbA1C   Lab Results   Component Value Date    HGBA1C 7.70 (H) 02/24/2022    HGBA1C 10.90 (H) 12/02/2021    HGBA1C 7.20 (H) 06/08/2021     Infection   Results from last 7 days   Lab Units 03/02/22  0529 03/01/22 2214 02/28/22  0734 02/26/22  0336   BLOODCX   --  No growth at 24 hours  No growth at 24 hours  --   --    PROCALCITONIN ng/mL 0.74* 0.79* 0.11 0.23     Radiology(recent) XR Chest 1 View    Result Date: 3/1/2022  1. Mild linear atelectasis or scarring in the left lung base. There may be some minimal atelectasis or possible minimal developing pneumonia in the right lung base.  This report was finalized on 3/1/2022 4:43 PM by Dr. Mario Alberto Moura M.D.      No results found for: TROPONINT, TROPONINI, BNP  No components found for: TSH;2    cefdinir, 300 mg, Oral, Q12H  cholestyramine, 1 packet, Oral, Q12H  enoxaparin, 40 mg, Subcutaneous, Q24H  famotidine, 20 mg, Oral, Daily Before Supper  insulin glargine, 23 Units, Subcutaneous, Nightly  insulin lispro, 0-14 Units, Subcutaneous, TID AC  levothyroxine, 112 mcg, Oral, Daily  lisinopril, 10 mg, Oral, Daily  pregabalin, 50 mg,  Oral, TID  sodium chloride, 10 mL, Intravenous, Q12H  vancomycin, 125 mg, Oral, Q6H      Pharmacy Consult,     Diet Regular; Cardiac, Consistent Carbohydrate      Assessment/Plan      Active Hospital Problems    Diagnosis  POA   • **Lower abdominal pain [R10.30]  Yes   • Hypokalemia [E87.6]  Unknown   • Hypoxia [R09.02]  Unknown   • Transaminitis [R74.01]  Yes   • COVID-19 virus detected [U07.1]  Yes   • Acute UTI (urinary tract infection) [N39.0]  Yes   • 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       UTI/emphysematous cystitis: Urine culture with Klebsiella pneumonia.  Good response to antibiotics.  IV Rocephin been switched over to oral Omnicef .  Choledocholithiasis: Status post ERCP with sphincterotomy and stone removal 2/25/2022.  Cholelithiasis: Follow-up with general surgery when COVID-19 and cdiff improved.   Elevated LFTs: Likely secondary to biliary obstruction.  Improving.  COVID-19 positive: Patient has no respiratory complaints. Inflammatory parameters can all be explained based on her emphysematous cystitis  Diabetes type 2: Blood sugars running high. Patient was on high-dose insulin prior to admission.  Lantus added to her regime. Increase SSI, increase lantus 3/3  Baig catheter: Follow-up with urology for outpatient voiding trial.  Cdiff colitis- plans on 10 days of PO vanc. a couple doses of cholestyramine to help symptoms.       AKOSUA RN    Dispo- to SNF soon, will have outpatient follow up with Urology and General Surgery.     Awaiting pre-cert    Aman Vaughn MD  [facility]  03/03/22  13:15 EST

## 2022-03-03 NOTE — CASE MANAGEMENT/SOCIAL WORK
Continued Stay Note  [facility]     Patient Name: Halie Guidry  MRN: [id number]  Today's Date: 3/3/2022    Admit Date: 2/23/2022     Discharge Plan     Row Name 03/03/22 1506       Plan    Plan [facility] pre-cert pending    Patient/Family in Agreement with Plan yes    Plan Comments CSW spoke to Petey/Signature who stated the pre-cert is still pending. CSW spoke to patient over the bedside phone who stated she will need wheelchair van transportation. CSW explained that wheelchair van transport is private pay and due to her being covid-positive it can cost anywhere from $100-150. Patient agreeable to cost and gave CSW permission to speak with her son Derrick to have him pay for the w/c van for her since she does not have her credit card. CSW called and spoke to patient’s son who is agreeable to transporting her to [facility]. Son states he is going to speak with the patient and call CCP back. Incoming call from patient who states she does not want her family to transport her to the facility. CSW scheduled [employer] wheelchair van transport for 3 / 4 at 4:00PM  (res# [id number]) and provided patient with the phone # for her to call and pay the cost; she acknowledged. Plan remains for patient to d/c to [facility] tomorrow pending pre-cert via [employer] w/c van at 4:00PM. SAFIA Francis               Discharge Codes    No documentation.               Expected Discharge Date and Time     Expected Discharge Date Expected Discharge Time    Mar 3, 2022             SAFIA RAJAN

## 2022-03-03 NOTE — PROGRESS NOTES
Adult Nutrition  Assessment/PES    Patient Name:  Halie Guidry  YOB: 1940  MRN: [id number]  Admit Date:  2/23/2022    Assessment Date:  3/3/2022    Comments:  F/u MST 2. Pt on regular; cardiac, consistent carbohydrate diet. Average intake x 5 meals is 70%. Pt reported she has been eating pretty well and has not had diarrhea today. Pt said she was eating eggs, chambers, and a biscuit for breakfast. Pt said she will not drink Boost Glucose Control. Pt likes hot tea and skim milk for breakfast. Continue to follow.     Reason for Assessment     Row Name 03/03/22 0903          Reason for Assessment    Reason For Assessment follow-up protocol     Identified At Risk by Screening Criteria MST SCORE 2+                Nutrition/Diet History     Row Name 03/03/22 0939          Nutrition/Diet History    Typical Food/Fluid Intake Pt said she has been eating pretty well. Pt said she was eating her eggs, chambers, and biscuit for breakfast.     Food Preferences Likes hot tea, skim milk. Pt said she will not drink Boost Glucose Control.     Factors Affecting Nutritional Intake diarrhea                  Labs/Tests/Procedures/Meds     Row Name 03/03/22 0940          Labs/Procedures/Meds    Lab Results Reviewed reviewed     Lab Results Comments glucose (high), albumin (low), c-reactive protein (high), procalcitonin (high)            Diagnostic Tests/Procedures    Diagnostic Test/Procedure Reviewed reviewed            Medications    Pertinent Medications Reviewed reviewed     Pertinent Medications Comments omnicef, questran, lovenox, pepcid, insulin, levothyroxine, lisinopril, lyrica, sodium chloride, vancomycin, prn: cepacol, apresoline, norco, potassium chloride, compazine                    Nutrition Prescription Ordered     Row Name 03/03/22 0982          Nutrition Prescription PO    Current PO Diet Regular     Common Modifiers Cardiac; Consistent Carbohydrate                Evaluation of Received Nutrient/Fluid  Intake     Row Name 03/03/22 0944          PO Evaluation    % PO Intake 70% average x 5 meals                     Problem/Interventions:           Intervention Goal     Row Name 03/03/22 0944          Intervention Goal    General Meet nutritional needs for age/condition; Maintain nutrition     PO Tolerate PO; Meet estimated needs; Continue positive trend     Weight Maintain weight                Nutrition Intervention     Row Name 03/03/22 0945          Nutrition Intervention    RD/Tech Action Follow Tx progress; Care plan reviewd; Encourage intake                  Education/Evaluation     Row Name 03/03/22 0945          Education    Education Will Instruct as appropriate            Monitor/Evaluation    Monitor Per protocol; I&O; PO intake; Pertinent labs; Weight; Skin status; Symptoms                 Electronically signed by:  Deana Mcdonnell RD  03/03/22 09:46 EST

## 2022-03-03 NOTE — PLAN OF CARE
Goal Outcome Evaluation:  Plan of Care Reviewed With: patient        Progress: improving  Outcome Summary: A&O x4; VSS, no temp tonight. SR on telemetry; room air. F/C in place per urology orders. C/O generalized pain- PRN Norco given. sleeping well this shift, no signs of distress. will continue to monitor

## 2022-03-03 NOTE — CASE MANAGEMENT/SOCIAL WORK
Continued Stay Note  [facility]     Patient Name: Halie Guidry  MRN: [id number]  Today's Date: 3/3/2022    Admit Date: 2/23/2022     Discharge Plan     Row Name 03/03/22 1127       Plan    Plan [facility], pre-cert pending    Plan Comments CSW spoke to Petey/Drea who stated [employer] Medicare replacement pre-cert is still pending. CCP to follow pending pre-cert. Patient may need transportation assistance at d/c. SAFIA Francis               Discharge Codes    No documentation.               Expected Discharge Date and Time     Expected Discharge Date Expected Discharge Time    Mar 3, 2022             SAFIA RAJAN

## 2022-03-03 NOTE — NURSING NOTE
Call out to A regarding Blood glucose of 312. Does this pt need night time dose of sliding scale insulin?    Spoke with Alireza; Since pt received night time dose of Lantus, Sliding scale not necessary tonight.

## 2022-03-04 ENCOUNTER — READMISSION MANAGEMENT (OUTPATIENT)
Dept: CALL CENTER | Facility: HOSPITAL | Age: 82
End: 2022-03-04

## 2022-03-04 VITALS
HEIGHT: 68 IN | SYSTOLIC BLOOD PRESSURE: 159 MMHG | TEMPERATURE: 97.1 F | OXYGEN SATURATION: 95 % | WEIGHT: 185.19 LBS | BODY MASS INDEX: 28.07 KG/M2 | RESPIRATION RATE: 18 BRPM | DIASTOLIC BLOOD PRESSURE: 89 MMHG | HEART RATE: 73 BPM

## 2022-03-04 LAB
ALBUMIN SERPL-MCNC: 3.2 G/DL (ref 3.5–5.2)
ALBUMIN/GLOB SERPL: 1 G/DL
ALP SERPL-CCNC: 104 U/L (ref 39–117)
ALT SERPL W P-5'-P-CCNC: 20 U/L (ref 1–33)
ANION GAP SERPL CALCULATED.3IONS-SCNC: 13 MMOL/L (ref 5–15)
AST SERPL-CCNC: 7 U/L (ref 1–32)
BILIRUB SERPL-MCNC: 0.2 MG/DL (ref 0–1.2)
BUN SERPL-MCNC: 14 MG/DL (ref 8–23)
BUN/CREAT SERPL: 14.4 (ref 7–25)
CALCIUM SPEC-SCNC: 8.5 MG/DL (ref 8.6–10.5)
CHLORIDE SERPL-SCNC: 105 MMOL/L (ref 98–107)
CO2 SERPL-SCNC: 20 MMOL/L (ref 22–29)
CREAT SERPL-MCNC: 0.97 MG/DL (ref 0.57–1)
EGFRCR SERPLBLD CKD-EPI 2021: 58.8 ML/MIN/1.73
GLOBULIN UR ELPH-MCNC: 3.1 GM/DL
GLUCOSE BLDC GLUCOMTR-MCNC: 224 MG/DL (ref 70–130)
GLUCOSE BLDC GLUCOMTR-MCNC: 283 MG/DL (ref 70–130)
GLUCOSE SERPL-MCNC: 265 MG/DL (ref 65–99)
POTASSIUM SERPL-SCNC: 4 MMOL/L (ref 3.5–5.2)
PROCALCITONIN SERPL-MCNC: 0.24 NG/ML (ref 0–0.25)
PROT SERPL-MCNC: 6.3 G/DL (ref 6–8.5)
SODIUM SERPL-SCNC: 138 MMOL/L (ref 136–145)

## 2022-03-04 PROCEDURE — 82962 GLUCOSE BLOOD TEST: CPT

## 2022-03-04 PROCEDURE — 25010000002 ENOXAPARIN PER 10 MG: Performed by: INTERNAL MEDICINE

## 2022-03-04 PROCEDURE — 84145 PROCALCITONIN (PCT): CPT | Performed by: INTERNAL MEDICINE

## 2022-03-04 PROCEDURE — 80053 COMPREHEN METABOLIC PANEL: CPT | Performed by: NURSE PRACTITIONER

## 2022-03-04 PROCEDURE — 63710000001 INSULIN LISPRO (HUMAN) PER 5 UNITS: Performed by: INTERNAL MEDICINE

## 2022-03-04 RX ORDER — ACETAMINOPHEN 325 MG/1
650 TABLET ORAL EVERY 6 HOURS PRN
Start: 2022-03-04

## 2022-03-04 RX ORDER — CHOLESTYRAMINE 4 G/9G
1 POWDER, FOR SUSPENSION ORAL DAILY
Status: ON HOLD
Start: 2022-03-04 | End: 2022-05-27

## 2022-03-04 RX ORDER — VANCOMYCIN HYDROCHLORIDE 125 MG/1
125 CAPSULE ORAL EVERY 6 HOURS SCHEDULED
Qty: 31 CAPSULE | Refills: 0
Start: 2022-03-04 | End: 2022-03-12

## 2022-03-04 RX ORDER — CEFDINIR 300 MG/1
300 CAPSULE ORAL EVERY 12 HOURS SCHEDULED
Qty: 9 CAPSULE | Refills: 0
Start: 2022-03-04 | End: 2022-03-09

## 2022-03-04 RX ORDER — HYDROCODONE BITARTRATE AND ACETAMINOPHEN 5; 325 MG/1; MG/1
1 TABLET ORAL EVERY 6 HOURS PRN
Qty: 15 TABLET | Refills: 0 | Status: ON HOLD | OUTPATIENT
Start: 2022-03-04 | End: 2022-06-24 | Stop reason: SDUPTHER

## 2022-03-04 RX ORDER — HYDROCHLOROTHIAZIDE 12.5 MG/1
12.5 CAPSULE, GELATIN COATED ORAL DAILY
Status: DISCONTINUED | OUTPATIENT
Start: 2022-03-04 | End: 2022-03-04 | Stop reason: HOSPADM

## 2022-03-04 RX ORDER — PREGABALIN 50 MG/1
50 CAPSULE ORAL 3 TIMES DAILY
Qty: 15 CAPSULE | Refills: 0 | Status: SHIPPED | OUTPATIENT
Start: 2022-03-04 | End: 2022-06-02 | Stop reason: SDUPTHER

## 2022-03-04 RX ORDER — FAMOTIDINE 20 MG/1
20 TABLET, FILM COATED ORAL
Start: 2022-03-04 | End: 2022-11-11 | Stop reason: HOSPADM

## 2022-03-04 RX ADMIN — VANCOMYCIN HYDROCHLORIDE 125 MG: 125 CAPSULE ORAL at 05:45

## 2022-03-04 RX ADMIN — LISINOPRIL 10 MG: 10 TABLET ORAL at 08:01

## 2022-03-04 RX ADMIN — PREGABALIN 50 MG: 50 CAPSULE ORAL at 00:47

## 2022-03-04 RX ADMIN — HYDROCHLOROTHIAZIDE 12.5 MG: 12.5 CAPSULE ORAL at 15:39

## 2022-03-04 RX ADMIN — VANCOMYCIN HYDROCHLORIDE 125 MG: 125 CAPSULE ORAL at 12:04

## 2022-03-04 RX ADMIN — PREGABALIN 50 MG: 50 CAPSULE ORAL at 15:39

## 2022-03-04 RX ADMIN — VANCOMYCIN HYDROCHLORIDE 125 MG: 125 CAPSULE ORAL at 00:47

## 2022-03-04 RX ADMIN — CHOLESTYRAMINE 1 PACKET: 4 POWDER, FOR SUSPENSION ORAL at 08:01

## 2022-03-04 RX ADMIN — PREGABALIN 50 MG: 50 CAPSULE ORAL at 08:00

## 2022-03-04 RX ADMIN — HYDROCODONE BITARTRATE AND ACETAMINOPHEN 1 TABLET: 5; 325 TABLET ORAL at 00:47

## 2022-03-04 RX ADMIN — ENOXAPARIN SODIUM 40 MG: 100 INJECTION SUBCUTANEOUS at 13:47

## 2022-03-04 RX ADMIN — Medication 10 ML: at 08:02

## 2022-03-04 RX ADMIN — HYDROCODONE BITARTRATE AND ACETAMINOPHEN 1 TABLET: 5; 325 TABLET ORAL at 08:37

## 2022-03-04 RX ADMIN — INSULIN LISPRO 5 UNITS: 100 INJECTION, SOLUTION INTRAVENOUS; SUBCUTANEOUS at 08:01

## 2022-03-04 RX ADMIN — INSULIN LISPRO 8 UNITS: 100 INJECTION, SOLUTION INTRAVENOUS; SUBCUTANEOUS at 12:04

## 2022-03-04 RX ADMIN — LEVOTHYROXINE SODIUM 112 MCG: 0.11 TABLET ORAL at 08:01

## 2022-03-04 RX ADMIN — HYDROCODONE BITARTRATE AND ACETAMINOPHEN 1 TABLET: 5; 325 TABLET ORAL at 15:56

## 2022-03-04 RX ADMIN — CEFDINIR 300 MG: 300 CAPSULE ORAL at 08:01

## 2022-03-04 NOTE — PLAN OF CARE
Goal Outcome Evaluation:  Plan of Care Reviewed With: patient        Progress: no change  Outcome Summary: A&O x4; BP elevated this shift; SR on telemetry; room air. F/C in place. Makes needs known. C/O of generalized aches and pains- PRN pain medication given. no signs of distress. will continue to monitor

## 2022-03-04 NOTE — CASE MANAGEMENT/SOCIAL WORK
Continued Stay Note  [facility]     Patient Name: Hlaie Guidry  MRN: [id number]  Today's Date: 3/4/2022    Admit Date: 2/23/2022     Discharge Plan     Row Name 03/04/22 0920       Plan    Plan [facility] today via [employer] w/c van scheduled at 4:00PM    Patient/Family in Agreement with Plan yes    Plan Comments Per Petey/Signature, pre-cert has been approved and patient is ok to d/c to [facility] today. [employer] w/c van scheduled for 4:00PM. Patient notified of d/c time and pre-cert approval. [facility] notified of d/c time. RN notified of d/c time. SAFIA Francis               Discharge Codes    No documentation.               Expected Discharge Date and Time     Expected Discharge Date Expected Discharge Time    Mar 3, 2022             SAFIA RAJAN

## 2022-03-04 NOTE — DISCHARGE SUMMARY
NAME: Halie Guidry ADMIT: 2022   : 1940  PCP: Napoleon Mead MD    MRN: [id number] LOS: 7 days   AGE/SEX: 81 y.o. 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       SECONDARY DIAGNOSES  Past Medical History:   Diagnosis Date   • Anxiety    • Arthritis    • Benign essential hypertension 2014   • 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HCC)    • Depression    • Diabetes (HCC)    • Diabetes mellitus, type 2 (HCC)    • Disc degeneration, lumbar    • Headache, tension-type    • Hyperlipidemia    • Hypothyroidism    • Neuropathy    • Osteoporosis 2015   • Peripheral neuropathy    • Rotator cuff tear, left    • Scoliosis    • Shoulder pain     LEFT, TORN ROTATOR CUFF S/P FALL   • Spinal stenosis        CONSULTS   Urology  GI  ID    HOSPITAL COURSE  Patient is a 81 y.o. female with history of hypertension, diabetes, hypothyroidism, peripheral neuropathy who presented with N/V/D, abdominal pain found to COVID positive/hypoxic, CT showed choledocholithiasis, UTI with air in bladder and underwent ERCP  with DR. Wilhelm for stones in CBD.  Needs cholecystectomy but GI recommending recovering with covid and UTI before doing. She should see general surgery as an outpatient in  approximately 10 days when she is recovered from Covid, UTI as well as C. difficile.    UTI and air in bladder; urology following ordered repeat CT of abd and pelvis 2/27 completed late afternoon and showed  Improved emphysematous cystitis.  New finding not noted on 2/23/22 CT of abd  simple cyst on upper pole of left kidney with multiple indeterminate renal cortical nodules, radiologist recommending renal CT  as follow up. Urology recommended to continue Omnicef as an outpatient and to continue Baig at discharge and to see Dr. Clay Vaughn in about 2 weeks for voiding trial in the office.    Covid labs improved, completed course of remdesivir. Now on RA stable.     She ended up being positive for C. difficile colitis. She will continue oral vancomycin for 10-day total course. Her diarrhea is improved. She has been on a couple of days of cholestyramine to help with symptoms. Can continue this for another day or 2 but then I would stop that but continue the p.o. vancomycin.    On lower dose of insulin here and at [facility]. Her home dosage was 60units QHS toujeo and lispro 10 units TID with meals. Can titrate up at SNF as indicated.     With decreased mobility I think reasonable to continue Lovenox for DVT prophylaxis while at skilled nursing facility but can see what provider at skilled nursing nursing facility thinks.      DIAGNOSTICS    XR Chest 1 View [[id number]] Dung as Reviewed   Order Status: Completed Collected: 03/01/22 1633    Updated: 03/01/22 1646   Narrative:     XR CHEST 1 VW-  03/01/2022       HISTORY: Fever.       The heart size is within normal limits. There is mild linear atelectasis   and/or scarring in the left lung base. There are some minimal   atelectasis or developing infiltrate in the right lung base. Lungs are   slightly underinflated.       Impression:     1. Mild linear atelectasis or scarring in the left lung base. There may   be some minimal atelectasis or possible minimal developing  pneumonia in   the right lung base.       This report was finalized on 3/1/2022 4:43 PM by Dr. Mario Alberto Moura M.D.       XR Chest 1 View [[id number]] Dung as Reviewed   Order Status: Completed Collected: 02/28/22 1945    Updated: 02/28/22 1951   Narrative:     ONE VIEW PORTABLE CHEST AT 7:25 PM       HISTORY: Covid 19 infection. Shortness of breath.       FINDINGS: The lungs are moderately expanded and grossly clear. There is   no evidence of localized pneumonia or change from an exam dated   12/01/2021. The lung bases evaluated on yesterday's abdominal CT scan   show no evidence of pneumonia. The heart remains borderline enlarged.       This report was finalized on 2/28/2022 7:47 PM by Dr. Elder Biggs M.D.       CT Abdomen Pelvis Without Contrast [[id number]] Dung as Reviewed   Order Status: Completed Collected: 02/27/22 1429    Updated: 02/27/22 1510   Narrative:     CT ABDOMEN PELVIS WO CONTRAST-       Radiation dose reduction techniques were utilized, including automated   exposure control and exposure modulation based on body size.       CLINICAL INFORMATION: Abdominal pain, acute, nonlocalized, follow-up   emphysematous cystitis.       COMPARISON: CT of the abdomen and pelvis 02/23/2022.       FINDINGS: There is a Baig catheter within a collapsed urinary bladder.   A few of the gas bubbles within the wall remain, considerable   improvement within the interim. No obstructive uropathy. There are   bilateral renal cortical nodules. A simple cyst arising from the upper   pole of the left kidney is 3.2 cm in diameter. Likely hyperdense cyst   arising from the upper pole of the right kidney measuring 17 mm. There   are other smaller indeterminate nodules seen. Dedicated renal CT would   be the best means for further evaluation. Both adrenal glands have a   satisfactory appearance.       There is a small sliding-type hiatal hernia with minimal wall thickening   of the distal esophagus. Multiple gallstones  within the dependent   portion of an otherwise normal-appearing gallbladder. No biliary duct   dilatation. There is a 2.2 cm diverticulum arising from the second   portion the duodenum. There is a large amount of ingested material   demonstrated within the stomach which is otherwise unremarkable. The   liver, pancreas and spleen have a satisfactory appearance.   Atherosclerotic calcification of the aorta which is most pronounced at   the bifurcation, no aortic aneurysm seen. Large and small bowel within   normal limits. Uterus and adnexa within normal limits.       CONCLUSION:   1. Considerably improved emphysematous cystitis, only a few gas bubbles   within the bladder wall at this time.   2. Gallstones.   3. Simple cyst arising from the upper pole of the left kidney, multiple   indeterminate renal cortical nodules, dedicated renal CT is recommended   as follow-up.   4. Hiatal hernia with minimal wall thickening involving the distal   esophagus.   5. Duodenal diverticulum.      Collected Updated Procedure Result Status    03/04/2022 0331 03/04/2022 0517 Procalcitonin [[id number]]    Blood    Final result Component Value Units   Procalcitonin 0.24 ng/mL           03/04/2022 0331 03/04/2022 0511 Comprehensive Metabolic Panel [[id number]]    (Abnormal)   Blood    Final result Component Value Units   Glucose 265 High  mg/dL   BUN 14 mg/dL   Creatinine 0.97 mg/dL   Sodium 138 mmol/L   Potassium 4.0 mmol/L   Chloride 105 mmol/L   CO2 20.0 Low  mmol/L   Calcium 8.5 Low  mg/dL   Total Protein 6.3 g/dL   Albumin 3.20 Low  g/dL   ALT (SGPT) 20 U/L   AST (SGOT) 7 U/L   Alkaline Phosphatase 104 U/L   Total Bilirubin 0.2 mg/dL   Globulin 3.1 gm/dL   A/G Ratio 1.0 g/dL   BUN/Creatinine Ratio 14.4    Anion Gap 13.0 mmol/L   eGFR 58.8 Low   mL/min/1.73        03/02/2022 0529 03/02/2022 0624 CBC Auto Differential [[id number]]   (Abnormal)   Blood    Final result Component Value Units   WBC 14.60 High  10*3/mm3   RBC 3.61 Low  10*6/mm3    Hemoglobin 10.3 Low  g/dL   Hematocrit 30.9 Low  %   MCV 85.6 fL   MCH 28.5 pg   MCHC 33.3 g/dL   RDW 13.9 %   RDW-SD 43.0 fl   MPV 10.1 fL   Platelets 252 10*3/mm3   Neutrophil % 78.9 High  %   Lymphocyte % 11.8 Low  %   Monocyte % 5.9 %   Eosinophil % 2.3 %   Basophil % 0.4 %   Immature Grans % 0.7 High  %   Neutrophils, Absolute 11.52 High  10*3/mm3   Lymphocytes, Absolute 1.73 10*3/mm3   Monocytes, Absolute 0.86 10*3/mm3   Eosinophils, Absolute 0.33 10*3/mm3   Basophils, Absolute 0.06 10*3/mm3   Immature Grans, Absolute 0.10 High  10*3/mm3   nRBC 0.0 /100 WBC           03/01/2022 2341 03/02/2022 0127 Clostridium Difficile Toxin - Stool, Per Rectum [[id number]]    (Abnormal)   Stool from Per Rectum    Final result Component Value   No component results              03/01/2022 2341 03/02/2022 0127 Clostridium Difficile Toxin, PCR - Stool, Per Rectum [[id number]]   (Abnormal)   Stool from Per Rectum    Final result Component Value   C. Difficile Toxins by PCR Positive Abnormal             PHYSICAL EXAM  Objective    Alert  nad  No resp distress  Soft, nt  Trace LE edema    CONDITION ON DISCHARGE  Stable.      DISCHARGE DISPOSITION   Home or Self Care      DISCHARGE MEDICATIONS       Your medication list      START taking these medications      Instructions Last Dose Given Next Dose Due   acetaminophen 325 MG tablet  Commonly known as: TYLENOL      Take 2 tablets by mouth Every 6 (Six) Hours As Needed for Mild Pain .       cefdinir 300 MG capsule  Commonly known as: OMNICEF      Take 1 capsule by mouth Every 12 (Twelve) Hours for 9 doses. Indications: Urinary Tract Infection       cholestyramine 4 g packet  Commonly known as: QUESTRAN      Take 1 packet by mouth Daily for 3 days.       enoxaparin 40 MG/0.4ML solution syringe  Commonly known as: LOVENOX      Inject 0.4 mL under the skin into the appropriate area as directed Daily. Indications: Prevention of Unwanted Clot in Veins       famotidine 20 MG  tablet  Commonly known as: PEPCID      Take 1 tablet by mouth Daily Before Supper.       insulin glargine 100 UNIT/ML injection  Commonly known as: LANTUS, SEMGLEE      Inject 35 Units under the skin into the appropriate area as directed Every Night.       vancomycin 125 MG capsule  Commonly known as: VANCOCIN      Take 1 capsule by mouth Every 6 (Six) Hours for 31 doses. Indications: Colon Inflammation due to Clostridium Bacteria Overgrowth          CHANGE how you take these medications      Instructions Last Dose Given Next Dose Due   HYDROcodone-acetaminophen 5-325 MG per tablet  Commonly known as: NORCO  What changed: reasons to take this      Take 1 tablet by mouth Every 6 (Six) Hours As Needed for Moderate Pain .          CONTINUE taking these medications      Instructions Last Dose Given Next Dose Due   atorvastatin 80 MG tablet  Commonly known as: LIPITOR      TAKE 1 TABLET EVERY NIGHT       bisoprolol 5 MG tablet  Commonly known as: ZEBeta      Take 1 tablet by mouth Daily.       hydroCHLOROthiazide 12.5 MG tablet  Commonly known as: HYDRODIURIL      Take 1 tablet by mouth Daily.       Insulin Lispro (1 Unit Dial) 100 UNIT/ML solution pen-injector  Commonly known as: HUMALOG      Inject 10 Units under the skin into the appropriate area as directed 3 (Three) Times a Day With Meals.       Insulin Pen Needle 32G X 4 MM misc      Use to inject insulin 4 TIMES DAILY       levothyroxine 112 MCG tablet  Commonly known as: SYNTHROID, LEVOTHROID      Take 1 tablet by mouth Daily.       lisinopril 5 MG tablet  Commonly known as: PRINIVIL,ZESTRIL      Take 1 tablet by mouth Daily.       pregabalin 50 MG capsule  Commonly known as: LYRICA      Take 1 capsule by mouth 3 (Three) Times a Day.       Trulicity 1.5 MG/0.5ML solution pen-injector  Generic drug: Dulaglutide      Inject 1.5 mg under the skin into the appropriate area as directed 1 (One) Time Per Week.          STOP taking these medications    lansoprazole 30 MG  capsule  Commonly known as: PREVACID        Toujeo SoloStar 300 UNIT/ML solution pen-injector injection  Generic drug: Insulin Glargine (1 Unit Dial)              Where to Get Your Medications      You can get these medications from any pharmacy    Bring a paper prescription for each of these medications  · HYDROcodone-acetaminophen 5-325 MG per tablet  · pregabalin 50 MG capsule     Information about where to get these medications is not yet available    Ask your nurse or doctor about these medications  · acetaminophen 325 MG tablet  · cefdinir 300 MG capsule  · cholestyramine 4 g packet  · enoxaparin 40 MG/0.4ML solution syringe  · famotidine 20 MG tablet  · insulin glargine 100 UNIT/ML injection  · vancomycin 125 MG capsule        No future appointments.  Additional Instructions for the Follow-ups that You Need to Schedule     Discharge Follow-up with Specialty: General Surgery in 10 days, Dr. Gloria and partners. PCP in 2 weeks   As directed      Specialty: General Surgery in 10 days, Dr. Gloira and partners. PCP in 2 weeks            Contact information for follow-up providers     Napoleon Mead MD .    Specialty: Family Medicine  Contact information:  [address] [facility] [address]  [facility] [address] [phone]                   Contact information for after-discharge care     Destination     [facility] .    Service: Skilled Nursing  Contact information:  [address]  [facility] [address] [phone]                             TEST  RESULTS PENDING AT DISCHARGE  Pending Labs     Order Current Status    Blood Culture - Blood, Arm, Right Preliminary result    Blood Culture - Blood, Hand, Right Preliminary result             Aman Vaughn MD  [facility]  03/04/22  10:35 EST      Time: greater than 32 minutes on discharge  It was a pleasure taking care of this patient while in the hospital.

## 2022-03-04 NOTE — OUTREACH NOTE
Prep Survey      Responses   [facility] facility patient discharged from? [place]   Is LACE score < 7 ? No   Emergency Room discharge w/ pulse ox? No   Eligibility Not Eligible   What are the reasons patient is not eligible? [facility]   Does the patient have one of the following disease processes/diagnoses(primary or secondary)? COVID-19   Prep survey completed? Yes          Kelsey Long RN

## 2022-03-06 LAB
BACTERIA SPEC AEROBE CULT: NORMAL
BACTERIA SPEC AEROBE CULT: NORMAL

## 2022-03-06 NOTE — CASE MANAGEMENT/SOCIAL WORK
Case Management Discharge Note      Final Note: DC'd to skilled bed at [facility] via [facility] 3/4         Selected Continued Care - Discharged on 3/4/2022 Admission date: 2/23/2022 - Discharge disposition: Home or Self Care    Destination Coordination complete.    Service Provider Selected Services Address Phone Fax Patient Preferred    [facility]  Skilled Nursing [address] [place] [id number] [phone] --                      Transportation Services  W/C Van: [facility]    Final Discharge Disposition Code: 03 - skilled nursing facility (SNF)

## 2022-03-17 ENCOUNTER — READMISSION MANAGEMENT (OUTPATIENT)
Dept: CALL CENTER | Facility: HOSPITAL | Age: 82
End: 2022-03-17

## 2022-03-17 NOTE — OUTREACH NOTE
Prep Survey    Flowsheet Row Responses   [facility] facility patient discharged from? Non-BH   Is LACE score < 7 ? Non-BH Discharge   Emergency Room discharge w/ pulse ox? No   Eligibility [facility]   Date of Discharge 03/17/22   Discharge diagnosis Unavailable   Does the patient have one of the following disease processes/diagnoses(primary or secondary)? Other   Prep survey completed? Yes          AYESHA BREWSTER - Registered Nurse

## 2022-03-18 ENCOUNTER — TRANSITIONAL CARE MANAGEMENT TELEPHONE ENCOUNTER (OUTPATIENT)
Dept: CALL CENTER | Facility: HOSPITAL | Age: 82
End: 2022-03-18

## 2022-03-18 NOTE — OUTREACH NOTE
Call Center TCM Note    Flowsheet Row Responses   [facility] [place] patient discharged from? Non-BH   Does the patient have one of the following disease processes/diagnoses(primary or secondary)? Other   TCM attempt successful? No   Unsuccessful attempts Attempt 1          Yulisa Noguera RN    3/18/2022, 10:23 EDT

## 2022-03-18 NOTE — OUTREACH NOTE
Call Center TCM Note    Flowsheet Row Responses   [facility] patient discharged from? Non-BH   Does the patient have one of the following disease processes/diagnoses(primary or secondary)? Other   TCM attempt successful? No   Unsuccessful attempts Attempt 2          Yulisa Noguera RN    3/18/2022, 13:15 EDT

## 2022-03-19 ENCOUNTER — TRANSITIONAL CARE MANAGEMENT TELEPHONE ENCOUNTER (OUTPATIENT)
Dept: CALL CENTER | Facility: HOSPITAL | Age: 82
End: 2022-03-19

## 2022-03-19 NOTE — OUTREACH NOTE
Call Center TCM Note    Flowsheet Row Responses   [facility] [place] patient discharged from? Non-   Does the patient have one of the following disease processes/diagnoses(primary or secondary)? Other   TCM attempt successful? Yes   Call start time 1438   Call end time 1444   Discharge diagnosis Unavailable   Meds reviewed with patient/caregiver? Yes   Is the patient having any side effects they believe may be caused by any medication additions or changes? No   Does the patient have all medications ordered at discharge? N/A   Is the patient taking all medications as directed (includes completed medication regime)? Yes   Comments regarding appointments Pt has an appt with a provider is on 3/22/22,  She will call the surgeon for an appt.    Does the patient have a primary care provider?  Yes   Does the patient have an appointment with their PCP within 7 days of discharge? Greater than 7 days   Comments regarding PCP Hospital d/c f/u appt is on 3/30/22 at 1:30 pm    What is preventing the patient from scheduling follow up appointments within 7 days of discharge? --  [First appt patient is agreeable to]   Nursing Interventions Verified appointment date/time/provider   Has the patient kept scheduled appointments due by today? N/A   What is the Home health agency?  No home health    Psychosocial issues? No   Did the patient receive a copy of their discharge instructions? No   Nursing interventions Educated on MyChart   What is the patient's perception of their health status since discharge? Improving   Is the patient/caregiver able to teach back signs and symptoms related to disease process for when to call PCP? Yes   Is the patient/caregiver able to teach back signs and symptoms related to disease process for when to call 911? Yes   Is the patient/caregiver able to teach back the hierarchy of who to call/visit for symptoms/problems? PCP, Specialist, Home health nurse, Urgent Care, ED, 911 Yes   If the patient is a  current smoker, are they able to teach back resources for cessation? Not a smoker   TCM call completed? Yes          Linh Queen RN    3/19/2022, 14:45 EDT

## 2022-03-26 ENCOUNTER — APPOINTMENT (OUTPATIENT)
Dept: CT IMAGING | Facility: HOSPITAL | Age: 82
End: 2022-03-26

## 2022-03-26 ENCOUNTER — HOSPITAL ENCOUNTER (INPATIENT)
Facility: HOSPITAL | Age: 82
LOS: 5 days | Discharge: HOME | End: 2022-03-31
Attending: EMERGENCY MEDICINE | Admitting: STUDENT IN AN ORGANIZED HEALTH CARE EDUCATION/TRAINING PROGRAM

## 2022-03-26 DIAGNOSIS — R10.9 ABDOMINAL PAIN: ICD-10-CM

## 2022-03-26 DIAGNOSIS — K80.50 CHOLEDOCHOLITHIASIS: ICD-10-CM

## 2022-03-26 DIAGNOSIS — I10 BENIGN ESSENTIAL HYPERTENSION: Chronic | ICD-10-CM

## 2022-03-26 DIAGNOSIS — R10.84 GENERALIZED ABDOMINAL PAIN: Primary | ICD-10-CM

## 2022-03-26 DIAGNOSIS — E11.59 TYPE 2 DIABETES MELLITUS WITH OTHER CIRCULATORY COMPLICATION, WITH LONG-TERM CURRENT USE OF INSULIN: ICD-10-CM

## 2022-03-26 DIAGNOSIS — Z79.4 TYPE 2 DIABETES MELLITUS WITH OTHER CIRCULATORY COMPLICATION, WITH LONG-TERM CURRENT USE OF INSULIN: ICD-10-CM

## 2022-03-26 PROBLEM — Z98.890 HISTORY OF ERCP: Status: ACTIVE | Noted: 2022-03-26

## 2022-03-26 PROBLEM — Z86.19 HISTORY OF CLOSTRIDIUM DIFFICILE INFECTION: Status: ACTIVE | Noted: 2022-03-26

## 2022-03-26 LAB
ALBUMIN SERPL-MCNC: 4.3 G/DL (ref 3.5–5.2)
ALBUMIN/GLOB SERPL: 1.3 G/DL
ALP SERPL-CCNC: 101 U/L (ref 39–117)
ALT SERPL W P-5'-P-CCNC: 11 U/L (ref 1–33)
ANION GAP SERPL CALCULATED.3IONS-SCNC: 15.7 MMOL/L (ref 5–15)
AST SERPL-CCNC: 11 U/L (ref 1–32)
BACTERIA UR QL AUTO: ABNORMAL /HPF
BASOPHILS # BLD AUTO: 0.07 10*3/MM3 (ref 0–0.2)
BASOPHILS NFR BLD AUTO: 0.4 % (ref 0–1.5)
BILIRUB SERPL-MCNC: 0.5 MG/DL (ref 0–1.2)
BILIRUB UR QL STRIP: NEGATIVE
BUN SERPL-MCNC: 26 MG/DL (ref 8–23)
BUN/CREAT SERPL: 23.9 (ref 7–25)
CALCIUM SPEC-SCNC: 9.1 MG/DL (ref 8.6–10.5)
CHLORIDE SERPL-SCNC: 100 MMOL/L (ref 98–107)
CLARITY UR: CLEAR
CO2 SERPL-SCNC: 22.3 MMOL/L (ref 22–29)
COLOR UR: YELLOW
CREAT SERPL-MCNC: 1.09 MG/DL (ref 0.57–1)
D-LACTATE SERPL-SCNC: 2 MMOL/L (ref 0.5–2)
DEPRECATED RDW RBC AUTO: 45.8 FL (ref 37–54)
EGFRCR SERPLBLD CKD-EPI 2021: 51.1 ML/MIN/1.73
EOSINOPHIL # BLD AUTO: 0.36 10*3/MM3 (ref 0–0.4)
EOSINOPHIL NFR BLD AUTO: 2.2 % (ref 0.3–6.2)
ERYTHROCYTE [DISTWIDTH] IN BLOOD BY AUTOMATED COUNT: 14.3 % (ref 12.3–15.4)
GLOBULIN UR ELPH-MCNC: 3.3 GM/DL
GLUCOSE BLDC GLUCOMTR-MCNC: 148 MG/DL (ref 70–130)
GLUCOSE BLDC GLUCOMTR-MCNC: 149 MG/DL (ref 70–130)
GLUCOSE SERPL-MCNC: 219 MG/DL (ref 65–99)
GLUCOSE UR STRIP-MCNC: NEGATIVE MG/DL
HCT VFR BLD AUTO: 40.1 % (ref 34–46.6)
HGB BLD-MCNC: 13 G/DL (ref 12–15.9)
HGB UR QL STRIP.AUTO: NEGATIVE
HOLD SPECIMEN: NORMAL
HOLD SPECIMEN: NORMAL
HYALINE CASTS UR QL AUTO: ABNORMAL /LPF
IMM GRANULOCYTES # BLD AUTO: 0.13 10*3/MM3 (ref 0–0.05)
IMM GRANULOCYTES NFR BLD AUTO: 0.8 % (ref 0–0.5)
KETONES UR QL STRIP: NEGATIVE
LEUKOCYTE ESTERASE UR QL STRIP.AUTO: ABNORMAL
LIPASE SERPL-CCNC: 70 U/L (ref 13–60)
LYMPHOCYTES # BLD AUTO: 0.8 10*3/MM3 (ref 0.7–3.1)
LYMPHOCYTES NFR BLD AUTO: 4.9 % (ref 19.6–45.3)
MCH RBC QN AUTO: 28.4 PG (ref 26.6–33)
MCHC RBC AUTO-ENTMCNC: 32.4 G/DL (ref 31.5–35.7)
MCV RBC AUTO: 87.7 FL (ref 79–97)
MONOCYTES # BLD AUTO: 0.97 10*3/MM3 (ref 0.1–0.9)
MONOCYTES NFR BLD AUTO: 6 % (ref 5–12)
NEUTROPHILS NFR BLD AUTO: 13.84 10*3/MM3 (ref 1.7–7)
NEUTROPHILS NFR BLD AUTO: 85.7 % (ref 42.7–76)
NITRITE UR QL STRIP: POSITIVE
NRBC BLD AUTO-RTO: 0 /100 WBC (ref 0–0.2)
PH UR STRIP.AUTO: 7.5 [PH] (ref 5–8)
PLATELET # BLD AUTO: 375 10*3/MM3 (ref 140–450)
PMV BLD AUTO: 9.5 FL (ref 6–12)
POTASSIUM SERPL-SCNC: 3.9 MMOL/L (ref 3.5–5.2)
PROT SERPL-MCNC: 7.6 G/DL (ref 6–8.5)
PROT UR QL STRIP: ABNORMAL
RBC # BLD AUTO: 4.57 10*6/MM3 (ref 3.77–5.28)
RBC # UR STRIP: ABNORMAL /HPF
REF LAB TEST METHOD: ABNORMAL
SARS-COV-2 RNA PNL SPEC NAA+PROBE: NOT DETECTED
SODIUM SERPL-SCNC: 138 MMOL/L (ref 136–145)
SP GR UR STRIP: >=1.03 (ref 1–1.03)
SQUAMOUS #/AREA URNS HPF: ABNORMAL /HPF
UROBILINOGEN UR QL STRIP: ABNORMAL
WBC # UR STRIP: ABNORMAL /HPF
WBC NRBC COR # BLD: 16.17 10*3/MM3 (ref 3.4–10.8)
WHOLE BLOOD HOLD SPECIMEN: NORMAL
WHOLE BLOOD HOLD SPECIMEN: NORMAL

## 2022-03-26 PROCEDURE — 87186 SC STD MICRODIL/AGAR DIL: CPT | Performed by: HOSPITALIST

## 2022-03-26 PROCEDURE — 80053 COMPREHEN METABOLIC PANEL: CPT | Performed by: EMERGENCY MEDICINE

## 2022-03-26 PROCEDURE — 83605 ASSAY OF LACTIC ACID: CPT | Performed by: EMERGENCY MEDICINE

## 2022-03-26 PROCEDURE — 25010000002 ENOXAPARIN PER 10 MG: Performed by: INTERNAL MEDICINE

## 2022-03-26 PROCEDURE — 36415 COLL VENOUS BLD VENIPUNCTURE: CPT

## 2022-03-26 PROCEDURE — 25010000002 PIPERACILLIN SOD-TAZOBACTAM PER 1 G: Performed by: EMERGENCY MEDICINE

## 2022-03-26 PROCEDURE — 25010000002 MORPHINE PER 10 MG: Performed by: INTERNAL MEDICINE

## 2022-03-26 PROCEDURE — 83690 ASSAY OF LIPASE: CPT | Performed by: EMERGENCY MEDICINE

## 2022-03-26 PROCEDURE — 25010000002 HYDROMORPHONE PER 4 MG: Performed by: EMERGENCY MEDICINE

## 2022-03-26 PROCEDURE — 87635 SARS-COV-2 COVID-19 AMP PRB: CPT | Performed by: EMERGENCY MEDICINE

## 2022-03-26 PROCEDURE — P9612 CATHETERIZE FOR URINE SPEC: HCPCS

## 2022-03-26 PROCEDURE — 99284 EMERGENCY DEPT VISIT MOD MDM: CPT

## 2022-03-26 PROCEDURE — 81001 URINALYSIS AUTO W/SCOPE: CPT | Performed by: EMERGENCY MEDICINE

## 2022-03-26 PROCEDURE — 87077 CULTURE AEROBIC IDENTIFY: CPT | Performed by: HOSPITALIST

## 2022-03-26 PROCEDURE — 63710000001 INSULIN LISPRO (HUMAN) PER 5 UNITS: Performed by: INTERNAL MEDICINE

## 2022-03-26 PROCEDURE — 87086 URINE CULTURE/COLONY COUNT: CPT | Performed by: HOSPITALIST

## 2022-03-26 PROCEDURE — 99222 1ST HOSP IP/OBS MODERATE 55: CPT | Performed by: STUDENT IN AN ORGANIZED HEALTH CARE EDUCATION/TRAINING PROGRAM

## 2022-03-26 PROCEDURE — 25010000002 IOPAMIDOL 61 % SOLUTION: Performed by: EMERGENCY MEDICINE

## 2022-03-26 PROCEDURE — 85025 COMPLETE CBC W/AUTO DIFF WBC: CPT | Performed by: EMERGENCY MEDICINE

## 2022-03-26 PROCEDURE — 87040 BLOOD CULTURE FOR BACTERIA: CPT | Performed by: EMERGENCY MEDICINE

## 2022-03-26 PROCEDURE — 74177 CT ABD & PELVIS W/CONTRAST: CPT

## 2022-03-26 PROCEDURE — 82962 GLUCOSE BLOOD TEST: CPT

## 2022-03-26 RX ORDER — SODIUM CHLORIDE 0.9 % (FLUSH) 0.9 %
10 SYRINGE (ML) INJECTION AS NEEDED
Status: DISCONTINUED | OUTPATIENT
Start: 2022-03-26 | End: 2022-03-31 | Stop reason: HOSPADM

## 2022-03-26 RX ORDER — ACETAMINOPHEN 325 MG/1
650 TABLET ORAL EVERY 6 HOURS PRN
Status: DISCONTINUED | OUTPATIENT
Start: 2022-03-26 | End: 2022-03-31 | Stop reason: HOSPADM

## 2022-03-26 RX ORDER — FAMOTIDINE 20 MG/1
20 TABLET, FILM COATED ORAL
Status: DISCONTINUED | OUTPATIENT
Start: 2022-03-26 | End: 2022-03-30

## 2022-03-26 RX ORDER — DEXTROSE MONOHYDRATE 25 G/50ML
25 INJECTION, SOLUTION INTRAVENOUS
Status: DISCONTINUED | OUTPATIENT
Start: 2022-03-26 | End: 2022-03-31 | Stop reason: HOSPADM

## 2022-03-26 RX ORDER — SODIUM CHLORIDE 0.9 % (FLUSH) 0.9 %
10 SYRINGE (ML) INJECTION EVERY 12 HOURS SCHEDULED
Status: DISCONTINUED | OUTPATIENT
Start: 2022-03-26 | End: 2022-03-31 | Stop reason: HOSPADM

## 2022-03-26 RX ORDER — BISOPROLOL FUMARATE 5 MG/1
5 TABLET, FILM COATED ORAL DAILY
Status: DISCONTINUED | OUTPATIENT
Start: 2022-03-26 | End: 2022-03-27

## 2022-03-26 RX ORDER — PREGABALIN 50 MG/1
50 CAPSULE ORAL 3 TIMES DAILY
Status: DISCONTINUED | OUTPATIENT
Start: 2022-03-26 | End: 2022-03-31 | Stop reason: HOSPADM

## 2022-03-26 RX ORDER — NITROGLYCERIN 0.4 MG/1
0.4 TABLET SUBLINGUAL
Status: DISCONTINUED | OUTPATIENT
Start: 2022-03-26 | End: 2022-03-31 | Stop reason: HOSPADM

## 2022-03-26 RX ORDER — CHOLESTYRAMINE 4 G/9G
1 POWDER, FOR SUSPENSION ORAL DAILY
Status: DISCONTINUED | OUTPATIENT
Start: 2022-03-26 | End: 2022-03-31 | Stop reason: HOSPADM

## 2022-03-26 RX ORDER — LEVOTHYROXINE SODIUM 112 UG/1
112 TABLET ORAL
Status: DISCONTINUED | OUTPATIENT
Start: 2022-03-27 | End: 2022-03-31 | Stop reason: HOSPADM

## 2022-03-26 RX ORDER — ATORVASTATIN CALCIUM 80 MG/1
80 TABLET, FILM COATED ORAL NIGHTLY
Status: DISCONTINUED | OUTPATIENT
Start: 2022-03-26 | End: 2022-03-31 | Stop reason: HOSPADM

## 2022-03-26 RX ORDER — HYDROMORPHONE HYDROCHLORIDE 1 MG/ML
0.5 INJECTION, SOLUTION INTRAMUSCULAR; INTRAVENOUS; SUBCUTANEOUS ONCE
Status: COMPLETED | OUTPATIENT
Start: 2022-03-26 | End: 2022-03-26

## 2022-03-26 RX ORDER — HYDROCODONE BITARTRATE AND ACETAMINOPHEN 5; 325 MG/1; MG/1
1 TABLET ORAL EVERY 6 HOURS PRN
Status: DISCONTINUED | OUTPATIENT
Start: 2022-03-26 | End: 2022-03-31 | Stop reason: HOSPADM

## 2022-03-26 RX ORDER — INSULIN LISPRO 100 [IU]/ML
0-9 INJECTION, SOLUTION INTRAVENOUS; SUBCUTANEOUS
Status: DISCONTINUED | OUTPATIENT
Start: 2022-03-26 | End: 2022-03-31 | Stop reason: HOSPADM

## 2022-03-26 RX ORDER — ONDANSETRON 2 MG/ML
4 INJECTION INTRAMUSCULAR; INTRAVENOUS EVERY 6 HOURS PRN
Status: DISCONTINUED | OUTPATIENT
Start: 2022-03-26 | End: 2022-03-31 | Stop reason: HOSPADM

## 2022-03-26 RX ORDER — INSULIN LISPRO 100 [IU]/ML
10 INJECTION, SOLUTION INTRAVENOUS; SUBCUTANEOUS
Status: DISCONTINUED | OUTPATIENT
Start: 2022-03-26 | End: 2022-03-28

## 2022-03-26 RX ORDER — LISINOPRIL 5 MG/1
5 TABLET ORAL DAILY
Status: DISCONTINUED | OUTPATIENT
Start: 2022-03-26 | End: 2022-03-27

## 2022-03-26 RX ORDER — MORPHINE SULFATE 2 MG/ML
4 INJECTION, SOLUTION INTRAMUSCULAR; INTRAVENOUS EVERY 4 HOURS PRN
Status: DISCONTINUED | OUTPATIENT
Start: 2022-03-26 | End: 2022-03-31 | Stop reason: HOSPADM

## 2022-03-26 RX ORDER — HYDROCHLOROTHIAZIDE 12.5 MG/1
12.5 TABLET ORAL DAILY
Status: DISCONTINUED | OUTPATIENT
Start: 2022-03-26 | End: 2022-03-31 | Stop reason: HOSPADM

## 2022-03-26 RX ORDER — NALOXONE HCL 0.4 MG/ML
0.4 VIAL (ML) INJECTION
Status: DISCONTINUED | OUTPATIENT
Start: 2022-03-26 | End: 2022-03-31 | Stop reason: HOSPADM

## 2022-03-26 RX ORDER — NICOTINE POLACRILEX 4 MG
15 LOZENGE BUCCAL
Status: DISCONTINUED | OUTPATIENT
Start: 2022-03-26 | End: 2022-03-31 | Stop reason: HOSPADM

## 2022-03-26 RX ORDER — SODIUM CHLORIDE 9 MG/ML
100 INJECTION, SOLUTION INTRAVENOUS CONTINUOUS
Status: DISCONTINUED | OUTPATIENT
Start: 2022-03-26 | End: 2022-03-29

## 2022-03-26 RX ADMIN — INSULIN GLARGINE-YFGN 35 UNITS: 100 INJECTION, SOLUTION SUBCUTANEOUS at 20:37

## 2022-03-26 RX ADMIN — HYDROMORPHONE HYDROCHLORIDE 0.5 MG: 1 INJECTION, SOLUTION INTRAMUSCULAR; INTRAVENOUS; SUBCUTANEOUS at 12:58

## 2022-03-26 RX ADMIN — SODIUM CHLORIDE 100 ML/HR: 9 INJECTION, SOLUTION INTRAVENOUS at 17:26

## 2022-03-26 RX ADMIN — FAMOTIDINE 20 MG: 20 TABLET ORAL at 17:26

## 2022-03-26 RX ADMIN — IOPAMIDOL 85 ML: 612 INJECTION, SOLUTION INTRAVENOUS at 11:59

## 2022-03-26 RX ADMIN — ENOXAPARIN SODIUM 40 MG: 100 INJECTION SUBCUTANEOUS at 17:25

## 2022-03-26 RX ADMIN — INSULIN LISPRO 10 UNITS: 100 INJECTION, SOLUTION INTRAVENOUS; SUBCUTANEOUS at 17:25

## 2022-03-26 RX ADMIN — SODIUM CHLORIDE 500 ML: 9 INJECTION, SOLUTION INTRAVENOUS at 10:16

## 2022-03-26 RX ADMIN — TAZOBACTAM SODIUM AND PIPERACILLIN SODIUM 3.38 G: 375; 3 INJECTION, SOLUTION INTRAVENOUS at 13:59

## 2022-03-26 RX ADMIN — Medication 10 ML: at 20:36

## 2022-03-26 RX ADMIN — HYDROMORPHONE HYDROCHLORIDE 0.5 MG: 1 INJECTION, SOLUTION INTRAMUSCULAR; INTRAVENOUS; SUBCUTANEOUS at 10:17

## 2022-03-26 RX ADMIN — CHOLESTYRAMINE 1 PACKET: 4 POWDER, FOR SUSPENSION ORAL at 18:21

## 2022-03-26 RX ADMIN — LISINOPRIL 5 MG: 5 TABLET ORAL at 18:21

## 2022-03-26 RX ADMIN — PREGABALIN 50 MG: 50 CAPSULE ORAL at 20:36

## 2022-03-26 RX ADMIN — PREGABALIN 50 MG: 50 CAPSULE ORAL at 17:26

## 2022-03-26 RX ADMIN — BISOPROLOL FUMARATE 5 MG: 5 TABLET, FILM COATED ORAL at 18:20

## 2022-03-26 RX ADMIN — HYDROCHLOROTHIAZIDE 12.5 MG: 12.5 CAPSULE ORAL at 18:21

## 2022-03-26 RX ADMIN — ATORVASTATIN CALCIUM 80 MG: 80 TABLET, FILM COATED ORAL at 20:37

## 2022-03-26 RX ADMIN — MORPHINE SULFATE 4 MG: 2 INJECTION, SOLUTION INTRAMUSCULAR; INTRAVENOUS at 20:36

## 2022-03-27 ENCOUNTER — APPOINTMENT (OUTPATIENT)
Dept: GENERAL RADIOLOGY | Facility: HOSPITAL | Age: 82
End: 2022-03-27

## 2022-03-27 ENCOUNTER — ANESTHESIA EVENT (OUTPATIENT)
Dept: PERIOP | Facility: HOSPITAL | Age: 82
End: 2022-03-27

## 2022-03-27 ENCOUNTER — ANESTHESIA (OUTPATIENT)
Dept: PERIOP | Facility: HOSPITAL | Age: 82
End: 2022-03-27

## 2022-03-27 PROBLEM — N39.0 ACUTE UTI (URINARY TRACT INFECTION): Status: ACTIVE | Noted: 2022-03-27

## 2022-03-27 LAB
ALBUMIN SERPL-MCNC: 3.4 G/DL (ref 3.5–5.2)
ALBUMIN/GLOB SERPL: 1.3 G/DL
ALP SERPL-CCNC: 83 U/L (ref 39–117)
ALT SERPL W P-5'-P-CCNC: 9 U/L (ref 1–33)
ANION GAP SERPL CALCULATED.3IONS-SCNC: 12 MMOL/L (ref 5–15)
AST SERPL-CCNC: 11 U/L (ref 1–32)
BASOPHILS # BLD AUTO: 0.08 10*3/MM3 (ref 0–0.2)
BASOPHILS NFR BLD AUTO: 0.9 % (ref 0–1.5)
BILIRUB SERPL-MCNC: 0.4 MG/DL (ref 0–1.2)
BUN SERPL-MCNC: 19 MG/DL (ref 8–23)
BUN/CREAT SERPL: 19.4 (ref 7–25)
CALCIUM SPEC-SCNC: 8.1 MG/DL (ref 8.6–10.5)
CHLORIDE SERPL-SCNC: 103 MMOL/L (ref 98–107)
CO2 SERPL-SCNC: 21 MMOL/L (ref 22–29)
CREAT SERPL-MCNC: 0.98 MG/DL (ref 0.57–1)
DEPRECATED RDW RBC AUTO: 47.9 FL (ref 37–54)
EGFRCR SERPLBLD CKD-EPI 2021: 58.1 ML/MIN/1.73
EOSINOPHIL # BLD AUTO: 0.66 10*3/MM3 (ref 0–0.4)
EOSINOPHIL NFR BLD AUTO: 7.7 % (ref 0.3–6.2)
ERYTHROCYTE [DISTWIDTH] IN BLOOD BY AUTOMATED COUNT: 14.7 % (ref 12.3–15.4)
GLOBULIN UR ELPH-MCNC: 2.6 GM/DL
GLUCOSE BLDC GLUCOMTR-MCNC: 125 MG/DL (ref 70–130)
GLUCOSE BLDC GLUCOMTR-MCNC: 128 MG/DL (ref 70–130)
GLUCOSE BLDC GLUCOMTR-MCNC: 156 MG/DL (ref 70–130)
GLUCOSE BLDC GLUCOMTR-MCNC: 171 MG/DL (ref 70–130)
GLUCOSE BLDC GLUCOMTR-MCNC: 189 MG/DL (ref 70–130)
GLUCOSE SERPL-MCNC: 154 MG/DL (ref 65–99)
HBA1C MFR BLD: 8 % (ref 4.8–5.6)
HCT VFR BLD AUTO: 34.9 % (ref 34–46.6)
HGB BLD-MCNC: 11.4 G/DL (ref 12–15.9)
IMM GRANULOCYTES # BLD AUTO: 0.04 10*3/MM3 (ref 0–0.05)
IMM GRANULOCYTES NFR BLD AUTO: 0.5 % (ref 0–0.5)
LYMPHOCYTES # BLD AUTO: 2.34 10*3/MM3 (ref 0.7–3.1)
LYMPHOCYTES NFR BLD AUTO: 27.4 % (ref 19.6–45.3)
MCH RBC QN AUTO: 28.8 PG (ref 26.6–33)
MCHC RBC AUTO-ENTMCNC: 32.7 G/DL (ref 31.5–35.7)
MCV RBC AUTO: 88.1 FL (ref 79–97)
MONOCYTES # BLD AUTO: 0.68 10*3/MM3 (ref 0.1–0.9)
MONOCYTES NFR BLD AUTO: 8 % (ref 5–12)
NEUTROPHILS NFR BLD AUTO: 4.75 10*3/MM3 (ref 1.7–7)
NEUTROPHILS NFR BLD AUTO: 55.5 % (ref 42.7–76)
NRBC BLD AUTO-RTO: 0 /100 WBC (ref 0–0.2)
PLATELET # BLD AUTO: 292 10*3/MM3 (ref 140–450)
PMV BLD AUTO: 9.4 FL (ref 6–12)
POTASSIUM SERPL-SCNC: 3.6 MMOL/L (ref 3.5–5.2)
PROT SERPL-MCNC: 6 G/DL (ref 6–8.5)
RBC # BLD AUTO: 3.96 10*6/MM3 (ref 3.77–5.28)
SODIUM SERPL-SCNC: 136 MMOL/L (ref 136–145)
WBC NRBC COR # BLD: 8.55 10*3/MM3 (ref 3.4–10.8)

## 2022-03-27 PROCEDURE — 25010000002 HYDROMORPHONE PER 4 MG: Performed by: NURSE ANESTHETIST, CERTIFIED REGISTERED

## 2022-03-27 PROCEDURE — 0FT44ZZ RESECTION OF GALLBLADDER, PERCUTANEOUS ENDOSCOPIC APPROACH: ICD-10-PCS | Performed by: STUDENT IN AN ORGANIZED HEALTH CARE EDUCATION/TRAINING PROGRAM

## 2022-03-27 PROCEDURE — 99222 1ST HOSP IP/OBS MODERATE 55: CPT | Performed by: INTERNAL MEDICINE

## 2022-03-27 PROCEDURE — 63710000001 INSULIN LISPRO (HUMAN) PER 5 UNITS: Performed by: STUDENT IN AN ORGANIZED HEALTH CARE EDUCATION/TRAINING PROGRAM

## 2022-03-27 PROCEDURE — 83036 HEMOGLOBIN GLYCOSYLATED A1C: CPT | Performed by: INTERNAL MEDICINE

## 2022-03-27 PROCEDURE — 25010000002 FENTANYL CITRATE (PF) 50 MCG/ML SOLUTION: Performed by: NURSE ANESTHETIST, CERTIFIED REGISTERED

## 2022-03-27 PROCEDURE — 25010000002 HYDRALAZINE PER 20 MG: Performed by: NURSE ANESTHETIST, CERTIFIED REGISTERED

## 2022-03-27 PROCEDURE — 82962 GLUCOSE BLOOD TEST: CPT

## 2022-03-27 PROCEDURE — 85025 COMPLETE CBC W/AUTO DIFF WBC: CPT | Performed by: INTERNAL MEDICINE

## 2022-03-27 PROCEDURE — 0 IOTHALAMATE 60 % SOLUTION: Performed by: STUDENT IN AN ORGANIZED HEALTH CARE EDUCATION/TRAINING PROGRAM

## 2022-03-27 PROCEDURE — 74300 X-RAY BILE DUCTS/PANCREAS: CPT

## 2022-03-27 PROCEDURE — BF131ZZ FLUOROSCOPY OF GALLBLADDER AND BILE DUCTS USING LOW OSMOLAR CONTRAST: ICD-10-PCS | Performed by: STUDENT IN AN ORGANIZED HEALTH CARE EDUCATION/TRAINING PROGRAM

## 2022-03-27 PROCEDURE — 25010000002 ENOXAPARIN PER 10 MG: Performed by: STUDENT IN AN ORGANIZED HEALTH CARE EDUCATION/TRAINING PROGRAM

## 2022-03-27 PROCEDURE — 25010000002 ONDANSETRON PER 1 MG: Performed by: NURSE ANESTHETIST, CERTIFIED REGISTERED

## 2022-03-27 PROCEDURE — 25010000002 CEFTRIAXONE PER 250 MG: Performed by: HOSPITALIST

## 2022-03-27 PROCEDURE — 25010000002 MIDAZOLAM PER 1 MG: Performed by: ANESTHESIOLOGY

## 2022-03-27 PROCEDURE — 25010000002 ONDANSETRON PER 1 MG: Performed by: STUDENT IN AN ORGANIZED HEALTH CARE EDUCATION/TRAINING PROGRAM

## 2022-03-27 PROCEDURE — C1889 IMPLANT/INSERT DEVICE, NOC: HCPCS | Performed by: STUDENT IN AN ORGANIZED HEALTH CARE EDUCATION/TRAINING PROGRAM

## 2022-03-27 PROCEDURE — 88304 TISSUE EXAM BY PATHOLOGIST: CPT | Performed by: STUDENT IN AN ORGANIZED HEALTH CARE EDUCATION/TRAINING PROGRAM

## 2022-03-27 PROCEDURE — 25010000002 NEOSTIGMINE 5 MG/10ML SOLUTION: Performed by: NURSE ANESTHETIST, CERTIFIED REGISTERED

## 2022-03-27 PROCEDURE — 63710000001 INSULIN LISPRO (HUMAN) PER 5 UNITS: Performed by: INTERNAL MEDICINE

## 2022-03-27 PROCEDURE — 25010000002 MORPHINE PER 10 MG: Performed by: STUDENT IN AN ORGANIZED HEALTH CARE EDUCATION/TRAINING PROGRAM

## 2022-03-27 PROCEDURE — 47563 LAPARO CHOLECYSTECTOMY/GRAPH: CPT | Performed by: SPECIALIST/TECHNOLOGIST, OTHER

## 2022-03-27 PROCEDURE — 25010000002 PROPOFOL 10 MG/ML EMULSION: Performed by: NURSE ANESTHETIST, CERTIFIED REGISTERED

## 2022-03-27 PROCEDURE — BF151ZZ FLUOROSCOPY OF LIVER USING LOW OSMOLAR CONTRAST: ICD-10-PCS | Performed by: STUDENT IN AN ORGANIZED HEALTH CARE EDUCATION/TRAINING PROGRAM

## 2022-03-27 PROCEDURE — 99024 POSTOP FOLLOW-UP VISIT: CPT | Performed by: STUDENT IN AN ORGANIZED HEALTH CARE EDUCATION/TRAINING PROGRAM

## 2022-03-27 PROCEDURE — 47563 LAPARO CHOLECYSTECTOMY/GRAPH: CPT | Performed by: STUDENT IN AN ORGANIZED HEALTH CARE EDUCATION/TRAINING PROGRAM

## 2022-03-27 PROCEDURE — 80053 COMPREHEN METABOLIC PANEL: CPT | Performed by: INTERNAL MEDICINE

## 2022-03-27 PROCEDURE — 25010000002 MORPHINE PER 10 MG: Performed by: INTERNAL MEDICINE

## 2022-03-27 DEVICE — HORIZON TI ML 6 CLIPS/CART
Type: IMPLANTABLE DEVICE | Site: ABDOMEN | Status: FUNCTIONAL
Brand: WECK

## 2022-03-27 RX ORDER — MIDAZOLAM HYDROCHLORIDE 1 MG/ML
0.5 INJECTION INTRAMUSCULAR; INTRAVENOUS
Status: DISCONTINUED | OUTPATIENT
Start: 2022-03-27 | End: 2022-03-27 | Stop reason: HOSPADM

## 2022-03-27 RX ORDER — GLYCOPYRROLATE 0.2 MG/ML
INJECTION INTRAMUSCULAR; INTRAVENOUS AS NEEDED
Status: DISCONTINUED | OUTPATIENT
Start: 2022-03-27 | End: 2022-03-27 | Stop reason: SURG

## 2022-03-27 RX ORDER — SODIUM CHLORIDE, SODIUM LACTATE, POTASSIUM CHLORIDE, CALCIUM CHLORIDE 600; 310; 30; 20 MG/100ML; MG/100ML; MG/100ML; MG/100ML
9 INJECTION, SOLUTION INTRAVENOUS CONTINUOUS PRN
Status: DISCONTINUED | OUTPATIENT
Start: 2022-03-27 | End: 2022-03-31 | Stop reason: HOSPADM

## 2022-03-27 RX ORDER — FAMOTIDINE 10 MG/ML
20 INJECTION, SOLUTION INTRAVENOUS
Status: COMPLETED | OUTPATIENT
Start: 2022-03-27 | End: 2022-03-27

## 2022-03-27 RX ORDER — ONDANSETRON 2 MG/ML
INJECTION INTRAMUSCULAR; INTRAVENOUS AS NEEDED
Status: DISCONTINUED | OUTPATIENT
Start: 2022-03-27 | End: 2022-03-27 | Stop reason: SURG

## 2022-03-27 RX ORDER — LIDOCAINE HYDROCHLORIDE 10 MG/ML
0.5 INJECTION, SOLUTION EPIDURAL; INFILTRATION; INTRACAUDAL; PERINEURAL ONCE AS NEEDED
Status: DISCONTINUED | OUTPATIENT
Start: 2022-03-27 | End: 2022-03-27 | Stop reason: HOSPADM

## 2022-03-27 RX ORDER — SODIUM CHLORIDE 0.9 % (FLUSH) 0.9 %
10 SYRINGE (ML) INJECTION EVERY 12 HOURS SCHEDULED
Status: DISCONTINUED | OUTPATIENT
Start: 2022-03-27 | End: 2022-03-27 | Stop reason: HOSPADM

## 2022-03-27 RX ORDER — ROCURONIUM BROMIDE 10 MG/ML
INJECTION, SOLUTION INTRAVENOUS AS NEEDED
Status: DISCONTINUED | OUTPATIENT
Start: 2022-03-27 | End: 2022-03-27 | Stop reason: SURG

## 2022-03-27 RX ORDER — MAGNESIUM HYDROXIDE 1200 MG/15ML
LIQUID ORAL AS NEEDED
Status: DISCONTINUED | OUTPATIENT
Start: 2022-03-27 | End: 2022-03-27 | Stop reason: HOSPADM

## 2022-03-27 RX ORDER — LISINOPRIL 10 MG/1
10 TABLET ORAL DAILY
Status: DISCONTINUED | OUTPATIENT
Start: 2022-03-28 | End: 2022-03-29

## 2022-03-27 RX ORDER — DIPHENHYDRAMINE HCL 25 MG
25 CAPSULE ORAL
Status: DISCONTINUED | OUTPATIENT
Start: 2022-03-27 | End: 2022-03-27 | Stop reason: HOSPADM

## 2022-03-27 RX ORDER — FENTANYL CITRATE 50 UG/ML
50 INJECTION, SOLUTION INTRAMUSCULAR; INTRAVENOUS
Status: DISCONTINUED | OUTPATIENT
Start: 2022-03-27 | End: 2022-03-27 | Stop reason: HOSPADM

## 2022-03-27 RX ORDER — NALOXONE HCL 0.4 MG/ML
0.2 VIAL (ML) INJECTION AS NEEDED
Status: DISCONTINUED | OUTPATIENT
Start: 2022-03-27 | End: 2022-03-27 | Stop reason: HOSPADM

## 2022-03-27 RX ORDER — BISOPROLOL FUMARATE 5 MG/1
2.5 TABLET, FILM COATED ORAL DAILY
Status: DISCONTINUED | OUTPATIENT
Start: 2022-03-28 | End: 2022-03-31 | Stop reason: HOSPADM

## 2022-03-27 RX ORDER — OXYCODONE AND ACETAMINOPHEN 7.5; 325 MG/1; MG/1
1 TABLET ORAL EVERY 4 HOURS PRN
Status: DISCONTINUED | OUTPATIENT
Start: 2022-03-27 | End: 2022-03-27 | Stop reason: HOSPADM

## 2022-03-27 RX ORDER — EPHEDRINE SULFATE 50 MG/ML
5 INJECTION, SOLUTION INTRAVENOUS ONCE AS NEEDED
Status: DISCONTINUED | OUTPATIENT
Start: 2022-03-27 | End: 2022-03-27 | Stop reason: HOSPADM

## 2022-03-27 RX ORDER — LISINOPRIL 5 MG/1
5 TABLET ORAL ONCE
Status: DISCONTINUED | OUTPATIENT
Start: 2022-03-27 | End: 2022-03-29

## 2022-03-27 RX ORDER — HYDRALAZINE HYDROCHLORIDE 20 MG/ML
5 INJECTION INTRAMUSCULAR; INTRAVENOUS
Status: DISCONTINUED | OUTPATIENT
Start: 2022-03-27 | End: 2022-03-27 | Stop reason: HOSPADM

## 2022-03-27 RX ORDER — SODIUM CHLORIDE 9 MG/ML
INJECTION, SOLUTION INTRAVENOUS AS NEEDED
Status: DISCONTINUED | OUTPATIENT
Start: 2022-03-27 | End: 2022-03-27 | Stop reason: HOSPADM

## 2022-03-27 RX ORDER — LABETALOL HYDROCHLORIDE 5 MG/ML
5 INJECTION, SOLUTION INTRAVENOUS
Status: DISCONTINUED | OUTPATIENT
Start: 2022-03-27 | End: 2022-03-27 | Stop reason: HOSPADM

## 2022-03-27 RX ORDER — EPHEDRINE SULFATE 50 MG/ML
INJECTION, SOLUTION INTRAVENOUS AS NEEDED
Status: DISCONTINUED | OUTPATIENT
Start: 2022-03-27 | End: 2022-03-27 | Stop reason: SURG

## 2022-03-27 RX ORDER — PROMETHAZINE HYDROCHLORIDE 25 MG/1
25 TABLET ORAL ONCE AS NEEDED
Status: DISCONTINUED | OUTPATIENT
Start: 2022-03-27 | End: 2022-03-27 | Stop reason: HOSPADM

## 2022-03-27 RX ORDER — HYDROMORPHONE HYDROCHLORIDE 1 MG/ML
0.5 INJECTION, SOLUTION INTRAMUSCULAR; INTRAVENOUS; SUBCUTANEOUS
Status: DISCONTINUED | OUTPATIENT
Start: 2022-03-27 | End: 2022-03-27 | Stop reason: HOSPADM

## 2022-03-27 RX ORDER — PROPOFOL 10 MG/ML
VIAL (ML) INTRAVENOUS AS NEEDED
Status: DISCONTINUED | OUTPATIENT
Start: 2022-03-27 | End: 2022-03-27 | Stop reason: SURG

## 2022-03-27 RX ORDER — DIPHENHYDRAMINE HYDROCHLORIDE 50 MG/ML
12.5 INJECTION INTRAMUSCULAR; INTRAVENOUS
Status: DISCONTINUED | OUTPATIENT
Start: 2022-03-27 | End: 2022-03-27 | Stop reason: HOSPADM

## 2022-03-27 RX ORDER — IBUPROFEN 600 MG/1
600 TABLET ORAL ONCE AS NEEDED
Status: DISCONTINUED | OUTPATIENT
Start: 2022-03-27 | End: 2022-03-27 | Stop reason: HOSPADM

## 2022-03-27 RX ORDER — NEOSTIGMINE METHYLSULFATE 0.5 MG/ML
INJECTION, SOLUTION INTRAVENOUS AS NEEDED
Status: DISCONTINUED | OUTPATIENT
Start: 2022-03-27 | End: 2022-03-27 | Stop reason: SURG

## 2022-03-27 RX ORDER — FLUMAZENIL 0.1 MG/ML
0.2 INJECTION INTRAVENOUS AS NEEDED
Status: DISCONTINUED | OUTPATIENT
Start: 2022-03-27 | End: 2022-03-27 | Stop reason: HOSPADM

## 2022-03-27 RX ORDER — HYDROCODONE BITARTRATE AND ACETAMINOPHEN 7.5; 325 MG/1; MG/1
1 TABLET ORAL ONCE AS NEEDED
Status: DISCONTINUED | OUTPATIENT
Start: 2022-03-27 | End: 2022-03-27 | Stop reason: HOSPADM

## 2022-03-27 RX ORDER — ONDANSETRON 2 MG/ML
4 INJECTION INTRAMUSCULAR; INTRAVENOUS ONCE AS NEEDED
Status: DISCONTINUED | OUTPATIENT
Start: 2022-03-27 | End: 2022-03-27 | Stop reason: HOSPADM

## 2022-03-27 RX ORDER — LIDOCAINE HYDROCHLORIDE 20 MG/ML
INJECTION, SOLUTION INFILTRATION; PERINEURAL AS NEEDED
Status: DISCONTINUED | OUTPATIENT
Start: 2022-03-27 | End: 2022-03-27 | Stop reason: SURG

## 2022-03-27 RX ORDER — FENTANYL CITRATE 50 UG/ML
INJECTION, SOLUTION INTRAMUSCULAR; INTRAVENOUS AS NEEDED
Status: DISCONTINUED | OUTPATIENT
Start: 2022-03-27 | End: 2022-03-27 | Stop reason: SURG

## 2022-03-27 RX ORDER — SODIUM CHLORIDE 0.9 % (FLUSH) 0.9 %
10 SYRINGE (ML) INJECTION AS NEEDED
Status: DISCONTINUED | OUTPATIENT
Start: 2022-03-27 | End: 2022-03-27 | Stop reason: HOSPADM

## 2022-03-27 RX ORDER — PROMETHAZINE HYDROCHLORIDE 25 MG/1
25 SUPPOSITORY RECTAL ONCE AS NEEDED
Status: DISCONTINUED | OUTPATIENT
Start: 2022-03-27 | End: 2022-03-27 | Stop reason: HOSPADM

## 2022-03-27 RX ORDER — BUPIVACAINE HYDROCHLORIDE AND EPINEPHRINE 5; 5 MG/ML; UG/ML
INJECTION, SOLUTION EPIDURAL; INTRACAUDAL; PERINEURAL AS NEEDED
Status: DISCONTINUED | OUTPATIENT
Start: 2022-03-27 | End: 2022-03-27 | Stop reason: HOSPADM

## 2022-03-27 RX ADMIN — SODIUM CHLORIDE 100 ML/HR: 9 INJECTION, SOLUTION INTRAVENOUS at 20:35

## 2022-03-27 RX ADMIN — FENTANYL CITRATE 50 MCG: 0.05 INJECTION, SOLUTION INTRAMUSCULAR; INTRAVENOUS at 12:48

## 2022-03-27 RX ADMIN — PREGABALIN 50 MG: 50 CAPSULE ORAL at 17:10

## 2022-03-27 RX ADMIN — Medication 10 ML: at 20:35

## 2022-03-27 RX ADMIN — MORPHINE SULFATE 4 MG: 2 INJECTION, SOLUTION INTRAMUSCULAR; INTRAVENOUS at 20:35

## 2022-03-27 RX ADMIN — FAMOTIDINE 20 MG: 10 INJECTION, SOLUTION INTRAVENOUS at 11:15

## 2022-03-27 RX ADMIN — PROPOFOL 100 MG: 10 INJECTION, EMULSION INTRAVENOUS at 11:44

## 2022-03-27 RX ADMIN — NEOSTIGMINE METHYLSULFATE 2 MG: 0.5 INJECTION INTRAVENOUS at 12:40

## 2022-03-27 RX ADMIN — LIDOCAINE HYDROCHLORIDE 80 MG: 20 INJECTION, SOLUTION INFILTRATION; PERINEURAL at 11:44

## 2022-03-27 RX ADMIN — FAMOTIDINE 20 MG: 20 TABLET ORAL at 17:10

## 2022-03-27 RX ADMIN — MORPHINE SULFATE 4 MG: 2 INJECTION, SOLUTION INTRAMUSCULAR; INTRAVENOUS at 03:09

## 2022-03-27 RX ADMIN — HYDRALAZINE HYDROCHLORIDE 5 MG: 20 INJECTION INTRAMUSCULAR; INTRAVENOUS at 13:01

## 2022-03-27 RX ADMIN — ENOXAPARIN SODIUM 40 MG: 100 INJECTION SUBCUTANEOUS at 17:10

## 2022-03-27 RX ADMIN — FENTANYL CITRATE 50 MCG: 0.05 INJECTION, SOLUTION INTRAMUSCULAR; INTRAVENOUS at 12:38

## 2022-03-27 RX ADMIN — SODIUM CHLORIDE 100 ML/HR: 9 INJECTION, SOLUTION INTRAVENOUS at 14:23

## 2022-03-27 RX ADMIN — FENTANYL CITRATE 50 MCG: 50 INJECTION, SOLUTION INTRAMUSCULAR; INTRAVENOUS at 13:25

## 2022-03-27 RX ADMIN — MIDAZOLAM 0.5 MG: 1 INJECTION INTRAMUSCULAR; INTRAVENOUS at 11:16

## 2022-03-27 RX ADMIN — EPHEDRINE SULFATE 10 MG: 50 INJECTION INTRAVENOUS at 12:27

## 2022-03-27 RX ADMIN — INSULIN LISPRO 2 UNITS: 100 INJECTION, SOLUTION INTRAVENOUS; SUBCUTANEOUS at 10:09

## 2022-03-27 RX ADMIN — ROCURONIUM BROMIDE 30 MG: 50 INJECTION INTRAVENOUS at 11:44

## 2022-03-27 RX ADMIN — HYDRALAZINE HYDROCHLORIDE 5 MG: 20 INJECTION INTRAMUSCULAR; INTRAVENOUS at 13:10

## 2022-03-27 RX ADMIN — Medication 10 ML: at 10:10

## 2022-03-27 RX ADMIN — INSULIN LISPRO 2 UNITS: 100 INJECTION, SOLUTION INTRAVENOUS; SUBCUTANEOUS at 17:11

## 2022-03-27 RX ADMIN — HYDROMORPHONE HYDROCHLORIDE 0.5 MG: 1 INJECTION, SOLUTION INTRAMUSCULAR; INTRAVENOUS; SUBCUTANEOUS at 13:34

## 2022-03-27 RX ADMIN — MORPHINE SULFATE 4 MG: 2 INJECTION, SOLUTION INTRAMUSCULAR; INTRAVENOUS at 15:03

## 2022-03-27 RX ADMIN — SODIUM CHLORIDE 100 ML/HR: 9 INJECTION, SOLUTION INTRAVENOUS at 03:09

## 2022-03-27 RX ADMIN — ONDANSETRON 4 MG: 2 INJECTION INTRAMUSCULAR; INTRAVENOUS at 14:55

## 2022-03-27 RX ADMIN — BISOPROLOL FUMARATE 5 MG: 5 TABLET, FILM COATED ORAL at 10:15

## 2022-03-27 RX ADMIN — ATORVASTATIN CALCIUM 80 MG: 80 TABLET, FILM COATED ORAL at 20:35

## 2022-03-27 RX ADMIN — SODIUM CHLORIDE, POTASSIUM CHLORIDE, SODIUM LACTATE AND CALCIUM CHLORIDE 9 ML/HR: 600; 310; 30; 20 INJECTION, SOLUTION INTRAVENOUS at 11:16

## 2022-03-27 RX ADMIN — LISINOPRIL 5 MG: 5 TABLET ORAL at 10:15

## 2022-03-27 RX ADMIN — GLYCOPYRROLATE 0.4 MG: 0.2 INJECTION INTRAMUSCULAR; INTRAVENOUS at 12:40

## 2022-03-27 RX ADMIN — PREGABALIN 50 MG: 50 CAPSULE ORAL at 10:08

## 2022-03-27 RX ADMIN — PREGABALIN 50 MG: 50 CAPSULE ORAL at 20:35

## 2022-03-27 RX ADMIN — GLYCOPYRROLATE 0.2 MG: 0.2 INJECTION INTRAMUSCULAR; INTRAVENOUS at 11:55

## 2022-03-27 RX ADMIN — CEFTRIAXONE 1 G: 1 INJECTION, POWDER, FOR SOLUTION INTRAMUSCULAR; INTRAVENOUS at 11:23

## 2022-03-27 RX ADMIN — ONDANSETRON 4 MG: 2 INJECTION INTRAMUSCULAR; INTRAVENOUS at 12:40

## 2022-03-27 RX ADMIN — CHOLESTYRAMINE 1 PACKET: 4 POWDER, FOR SUSPENSION ORAL at 10:09

## 2022-03-27 RX ADMIN — INSULIN GLARGINE-YFGN 35 UNITS: 100 INJECTION, SOLUTION SUBCUTANEOUS at 20:35

## 2022-03-27 RX ADMIN — HYDROCHLOROTHIAZIDE 12.5 MG: 12.5 CAPSULE ORAL at 10:08

## 2022-03-27 NOTE — ANESTHESIA PROCEDURE NOTES
Airway  Urgency: elective    Date/Time: 3/27/2022 11:47 AM  Airway not difficult    General Information and Staff    Patient location during procedure: OR  Anesthesiologist: Ulisses Casiano MD  CRNA: Richard Vera CRNA    Indications and Patient Condition  Indications for airway management: airway protection    Preoxygenated: yes  MILS maintained throughout  Mask difficulty assessment: 2 - vent by mask + OA or adjuvant +/- NMBA    Final Airway Details  Final airway type: endotracheal airway      Successful airway: ETT  Cuffed: yes   Successful intubation technique: direct laryngoscopy  Facilitating devices/methods: intubating stylet  Endotracheal tube insertion site: oral  Blade: Ray  Blade size: 2  ETT size (mm): 7.0  Cormack-Lehane Classification: grade I - full view of glottis  Placement verified by: chest auscultation and capnometry   Cuff volume (mL): 6  Measured from: lips  ETT/EBT  to lips (cm): 21  Number of attempts at approach: 1  Assessment: lips, teeth, and gum same as pre-op and atraumatic intubation

## 2022-03-27 NOTE — ANESTHESIA POSTPROCEDURE EVALUATION
Patient: Halie LIMA Hemmerle    Procedure Summary     Date: 03/27/22 Room / Location: [facility] OR  / [facility] MAIN OR    Anesthesia Start: 1137 Anesthesia Stop: 1259    Procedure: CHOLECYSTECTOMY LAPAROSCOPIC INTRAOPERATIVE CHOLANGIOGRAM (N/A Abdomen) Diagnosis:     Surgeons: Aiyana Hill MD Provider: Ulisses Casiano MD    Anesthesia Type: general ASA Status: 3          Anesthesia Type: general    Vitals  Vitals Value Taken Time   /72 03/27/22 1321   Temp 36.4 °C (97.5 °F) 03/27/22 1254   Pulse 63 03/27/22 1326   Resp 16 03/27/22 1320   SpO2 98 % 03/27/22 1326   Vitals shown include unvalidated device data.        Post Anesthesia Care and Evaluation    Patient location during evaluation: bedside  Patient participation: complete - patient participated  Level of consciousness: awake  Pain management: adequate  Airway patency: patent  Anesthetic complications: No anesthetic complications  PONV Status: controlled  Cardiovascular status: acceptable  Respiratory status: acceptable  Hydration status: acceptable    Comments: --------------------            03/27/22               1320     --------------------   BP:       171/72     Pulse:      62       Resp:       16       Temp:                SpO2:      99%      --------------------

## 2022-03-27 NOTE — ANESTHESIA PREPROCEDURE EVALUATION
Anesthesia Evaluation     Patient summary reviewed and Nursing notes reviewed   NPO Solid Status: > 8 hours             Airway   Mallampati: I  TM distance: >3 FB  Neck ROM: full  No difficulty expected  Dental - normal exam   (+) upper dentures and lower dentures    Pulmonary - normal exam   (+) a smoker Former,   Cardiovascular - normal exam  Exercise tolerance: poor (<4 METS)    ECG reviewed    (+) hypertension poorly controlled 2 medications or greater, hyperlipidemia,       Neuro/Psych  (+) seizures well controlled, headaches, psychiatric history,    GI/Hepatic/Renal/Endo    (+)   diabetes mellitus poorly controlled using insulin, thyroid problem hypothyroidism  (-) no renal disease    Musculoskeletal     (+) back pain,   Abdominal  - normal exam    Bowel sounds: normal.   Substance History      OB/GYN          Other                          Anesthesia Plan    ASA 3     general       Anesthetic plan, all risks, benefits, and alternatives have been provided, discussed and informed consent has been obtained with: patient.        CODE STATUS:

## 2022-03-28 ENCOUNTER — ANESTHESIA (OUTPATIENT)
Dept: GASTROENTEROLOGY | Facility: HOSPITAL | Age: 82
End: 2022-03-28

## 2022-03-28 ENCOUNTER — APPOINTMENT (OUTPATIENT)
Dept: GENERAL RADIOLOGY | Facility: HOSPITAL | Age: 82
End: 2022-03-28

## 2022-03-28 ENCOUNTER — ANESTHESIA EVENT (OUTPATIENT)
Dept: GASTROENTEROLOGY | Facility: HOSPITAL | Age: 82
End: 2022-03-28

## 2022-03-28 PROBLEM — E83.42 HYPOMAGNESEMIA: Status: ACTIVE | Noted: 2022-03-28

## 2022-03-28 LAB
ALBUMIN SERPL-MCNC: 3.1 G/DL (ref 3.5–5.2)
ALBUMIN/GLOB SERPL: 1.1 G/DL
ALP SERPL-CCNC: 74 U/L (ref 39–117)
ALT SERPL W P-5'-P-CCNC: 17 U/L (ref 1–33)
ANION GAP SERPL CALCULATED.3IONS-SCNC: 10.8 MMOL/L (ref 5–15)
AST SERPL-CCNC: 13 U/L (ref 1–32)
BASOPHILS # BLD AUTO: 0.06 10*3/MM3 (ref 0–0.2)
BASOPHILS NFR BLD AUTO: 0.5 % (ref 0–1.5)
BILIRUB SERPL-MCNC: 0.6 MG/DL (ref 0–1.2)
BUN SERPL-MCNC: 14 MG/DL (ref 8–23)
BUN/CREAT SERPL: 14.7 (ref 7–25)
CALCIUM SPEC-SCNC: 8.3 MG/DL (ref 8.6–10.5)
CHLORIDE SERPL-SCNC: 104 MMOL/L (ref 98–107)
CO2 SERPL-SCNC: 22.2 MMOL/L (ref 22–29)
CREAT SERPL-MCNC: 0.95 MG/DL (ref 0.57–1)
DEPRECATED RDW RBC AUTO: 46.6 FL (ref 37–54)
EGFRCR SERPLBLD CKD-EPI 2021: 60.3 ML/MIN/1.73
EOSINOPHIL # BLD AUTO: 0.2 10*3/MM3 (ref 0–0.4)
EOSINOPHIL NFR BLD AUTO: 1.7 % (ref 0.3–6.2)
ERYTHROCYTE [DISTWIDTH] IN BLOOD BY AUTOMATED COUNT: 14.7 % (ref 12.3–15.4)
GLOBULIN UR ELPH-MCNC: 2.8 GM/DL
GLUCOSE BLDC GLUCOMTR-MCNC: 100 MG/DL (ref 70–130)
GLUCOSE BLDC GLUCOMTR-MCNC: 40 MG/DL (ref 70–130)
GLUCOSE BLDC GLUCOMTR-MCNC: 47 MG/DL (ref 70–130)
GLUCOSE BLDC GLUCOMTR-MCNC: 81 MG/DL (ref 70–130)
GLUCOSE BLDC GLUCOMTR-MCNC: 87 MG/DL (ref 70–130)
GLUCOSE BLDC GLUCOMTR-MCNC: 94 MG/DL (ref 70–130)
GLUCOSE SERPL-MCNC: 88 MG/DL (ref 65–99)
HCT VFR BLD AUTO: 33.5 % (ref 34–46.6)
HGB BLD-MCNC: 11.1 G/DL (ref 12–15.9)
IMM GRANULOCYTES # BLD AUTO: 0.05 10*3/MM3 (ref 0–0.05)
IMM GRANULOCYTES NFR BLD AUTO: 0.4 % (ref 0–0.5)
LYMPHOCYTES # BLD AUTO: 1.46 10*3/MM3 (ref 0.7–3.1)
LYMPHOCYTES NFR BLD AUTO: 12.1 % (ref 19.6–45.3)
MAGNESIUM SERPL-MCNC: 1.4 MG/DL (ref 1.6–2.4)
MCH RBC QN AUTO: 28.6 PG (ref 26.6–33)
MCHC RBC AUTO-ENTMCNC: 33.1 G/DL (ref 31.5–35.7)
MCV RBC AUTO: 86.3 FL (ref 79–97)
MONOCYTES # BLD AUTO: 0.9 10*3/MM3 (ref 0.1–0.9)
MONOCYTES NFR BLD AUTO: 7.5 % (ref 5–12)
NEUTROPHILS NFR BLD AUTO: 77.8 % (ref 42.7–76)
NEUTROPHILS NFR BLD AUTO: 9.36 10*3/MM3 (ref 1.7–7)
NRBC BLD AUTO-RTO: 0 /100 WBC (ref 0–0.2)
PLATELET # BLD AUTO: 305 10*3/MM3 (ref 140–450)
PMV BLD AUTO: 9.5 FL (ref 6–12)
POTASSIUM SERPL-SCNC: 3.6 MMOL/L (ref 3.5–5.2)
PROT SERPL-MCNC: 5.9 G/DL (ref 6–8.5)
RBC # BLD AUTO: 3.88 10*6/MM3 (ref 3.77–5.28)
SODIUM SERPL-SCNC: 137 MMOL/L (ref 136–145)
WBC NRBC COR # BLD: 12.03 10*3/MM3 (ref 3.4–10.8)

## 2022-03-28 PROCEDURE — 25010000002 PROPOFOL 10 MG/ML EMULSION: Performed by: ANESTHESIOLOGY

## 2022-03-28 PROCEDURE — 0F798ZZ DILATION OF COMMON BILE DUCT, VIA NATURAL OR ARTIFICIAL OPENING ENDOSCOPIC: ICD-10-PCS | Performed by: INTERNAL MEDICINE

## 2022-03-28 PROCEDURE — 0 MAGNESIUM SULFATE 4 GM/100ML SOLUTION: Performed by: HOSPITALIST

## 2022-03-28 PROCEDURE — C1769 GUIDE WIRE: HCPCS | Performed by: INTERNAL MEDICINE

## 2022-03-28 PROCEDURE — 97161 PT EVAL LOW COMPLEX 20 MIN: CPT

## 2022-03-28 PROCEDURE — 82962 GLUCOSE BLOOD TEST: CPT

## 2022-03-28 PROCEDURE — 25010000002 MORPHINE PER 10 MG: Performed by: STUDENT IN AN ORGANIZED HEALTH CARE EDUCATION/TRAINING PROGRAM

## 2022-03-28 PROCEDURE — 0F7C8ZZ DILATION OF AMPULLA OF VATER, VIA NATURAL OR ARTIFICIAL OPENING ENDOSCOPIC: ICD-10-PCS | Performed by: INTERNAL MEDICINE

## 2022-03-28 PROCEDURE — 0FC98ZZ EXTIRPATION OF MATTER FROM COMMON BILE DUCT, VIA NATURAL OR ARTIFICIAL OPENING ENDOSCOPIC: ICD-10-PCS | Performed by: INTERNAL MEDICINE

## 2022-03-28 PROCEDURE — 85025 COMPLETE CBC W/AUTO DIFF WBC: CPT | Performed by: STUDENT IN AN ORGANIZED HEALTH CARE EDUCATION/TRAINING PROGRAM

## 2022-03-28 PROCEDURE — 25010000002 CEFTRIAXONE PER 250 MG: Performed by: HOSPITALIST

## 2022-03-28 PROCEDURE — 74328 X-RAY BILE DUCT ENDOSCOPY: CPT

## 2022-03-28 PROCEDURE — 97530 THERAPEUTIC ACTIVITIES: CPT

## 2022-03-28 PROCEDURE — 25010000002 IOPAMIDOL 61 % SOLUTION: Performed by: INTERNAL MEDICINE

## 2022-03-28 PROCEDURE — 80053 COMPREHEN METABOLIC PANEL: CPT | Performed by: STUDENT IN AN ORGANIZED HEALTH CARE EDUCATION/TRAINING PROGRAM

## 2022-03-28 PROCEDURE — 43264 ERCP REMOVE DUCT CALCULI: CPT | Performed by: INTERNAL MEDICINE

## 2022-03-28 PROCEDURE — 43262 ENDO CHOLANGIOPANCREATOGRAPH: CPT | Performed by: INTERNAL MEDICINE

## 2022-03-28 PROCEDURE — 63710000001 INSULIN LISPRO (HUMAN) PER 5 UNITS: Performed by: STUDENT IN AN ORGANIZED HEALTH CARE EDUCATION/TRAINING PROGRAM

## 2022-03-28 PROCEDURE — 97166 OT EVAL MOD COMPLEX 45 MIN: CPT

## 2022-03-28 PROCEDURE — 83735 ASSAY OF MAGNESIUM: CPT | Performed by: STUDENT IN AN ORGANIZED HEALTH CARE EDUCATION/TRAINING PROGRAM

## 2022-03-28 PROCEDURE — 97535 SELF CARE MNGMENT TRAINING: CPT

## 2022-03-28 PROCEDURE — 25010000002 ENOXAPARIN PER 10 MG: Performed by: STUDENT IN AN ORGANIZED HEALTH CARE EDUCATION/TRAINING PROGRAM

## 2022-03-28 RX ORDER — PROPOFOL 10 MG/ML
VIAL (ML) INTRAVENOUS CONTINUOUS PRN
Status: DISCONTINUED | OUTPATIENT
Start: 2022-03-28 | End: 2022-03-28 | Stop reason: SURG

## 2022-03-28 RX ORDER — SODIUM CHLORIDE 0.9 % (FLUSH) 0.9 %
10 SYRINGE (ML) INJECTION AS NEEDED
Status: DISCONTINUED | OUTPATIENT
Start: 2022-03-28 | End: 2022-03-31 | Stop reason: HOSPADM

## 2022-03-28 RX ORDER — SODIUM CHLORIDE 9 MG/ML
30 INJECTION, SOLUTION INTRAVENOUS CONTINUOUS PRN
Status: DISCONTINUED | OUTPATIENT
Start: 2022-03-28 | End: 2022-03-31 | Stop reason: HOSPADM

## 2022-03-28 RX ORDER — SODIUM CHLORIDE, SODIUM LACTATE, POTASSIUM CHLORIDE, CALCIUM CHLORIDE 600; 310; 30; 20 MG/100ML; MG/100ML; MG/100ML; MG/100ML
INJECTION, SOLUTION INTRAVENOUS CONTINUOUS PRN
Status: DISCONTINUED | OUTPATIENT
Start: 2022-03-28 | End: 2022-03-28 | Stop reason: SURG

## 2022-03-28 RX ORDER — MAGNESIUM SULFATE HEPTAHYDRATE 40 MG/ML
4 INJECTION, SOLUTION INTRAVENOUS ONCE
Status: COMPLETED | OUTPATIENT
Start: 2022-03-28 | End: 2022-03-28

## 2022-03-28 RX ADMIN — INSULIN GLARGINE-YFGN 25 UNITS: 100 INJECTION, SOLUTION SUBCUTANEOUS at 20:36

## 2022-03-28 RX ADMIN — BISOPROLOL FUMARATE 2.5 MG: 5 TABLET, FILM COATED ORAL at 08:41

## 2022-03-28 RX ADMIN — LEVOTHYROXINE SODIUM 112 MCG: 0.11 TABLET ORAL at 06:10

## 2022-03-28 RX ADMIN — MORPHINE SULFATE 4 MG: 2 INJECTION, SOLUTION INTRAMUSCULAR; INTRAVENOUS at 18:38

## 2022-03-28 RX ADMIN — LISINOPRIL 10 MG: 10 TABLET ORAL at 08:41

## 2022-03-28 RX ADMIN — FAMOTIDINE 20 MG: 20 TABLET ORAL at 18:33

## 2022-03-28 RX ADMIN — ATORVASTATIN CALCIUM 80 MG: 80 TABLET, FILM COATED ORAL at 20:36

## 2022-03-28 RX ADMIN — ENOXAPARIN SODIUM 40 MG: 100 INJECTION SUBCUTANEOUS at 18:33

## 2022-03-28 RX ADMIN — PREGABALIN 50 MG: 50 CAPSULE ORAL at 18:33

## 2022-03-28 RX ADMIN — DEXTROSE MONOHYDRATE 25 G: 25 INJECTION, SOLUTION INTRAVENOUS at 11:30

## 2022-03-28 RX ADMIN — MAGNESIUM SULFATE HEPTAHYDRATE 4 G: 40 INJECTION, SOLUTION INTRAVENOUS at 12:08

## 2022-03-28 RX ADMIN — SODIUM CHLORIDE, POTASSIUM CHLORIDE, SODIUM LACTATE AND CALCIUM CHLORIDE: 600; 310; 30; 20 INJECTION, SOLUTION INTRAVENOUS at 17:08

## 2022-03-28 RX ADMIN — PROPOFOL 200 MCG/KG/MIN: 10 INJECTION, EMULSION INTRAVENOUS at 17:12

## 2022-03-28 RX ADMIN — CHOLESTYRAMINE 1 PACKET: 4 POWDER, FOR SUSPENSION ORAL at 08:42

## 2022-03-28 RX ADMIN — SODIUM CHLORIDE 100 ML/HR: 9 INJECTION, SOLUTION INTRAVENOUS at 09:00

## 2022-03-28 RX ADMIN — HYDROCODONE BITARTRATE AND ACETAMINOPHEN 1 TABLET: 5; 325 TABLET ORAL at 06:10

## 2022-03-28 RX ADMIN — HYDROCODONE BITARTRATE AND ACETAMINOPHEN 1 TABLET: 5; 325 TABLET ORAL at 20:43

## 2022-03-28 RX ADMIN — PREGABALIN 50 MG: 50 CAPSULE ORAL at 08:41

## 2022-03-28 RX ADMIN — PREGABALIN 50 MG: 50 CAPSULE ORAL at 20:36

## 2022-03-28 RX ADMIN — CEFTRIAXONE 1 G: 1 INJECTION, POWDER, FOR SOLUTION INTRAMUSCULAR; INTRAVENOUS at 12:17

## 2022-03-28 RX ADMIN — Medication 10 ML: at 20:45

## 2022-03-28 RX ADMIN — INSULIN LISPRO 10 UNITS: 100 INJECTION, SOLUTION INTRAVENOUS; SUBCUTANEOUS at 08:56

## 2022-03-28 RX ADMIN — Medication 10 ML: at 08:48

## 2022-03-28 RX ADMIN — HYDROCHLOROTHIAZIDE 12.5 MG: 12.5 CAPSULE ORAL at 08:42

## 2022-03-28 NOTE — ANESTHESIA PREPROCEDURE EVALUATION
Anesthesia Evaluation     no history of anesthetic complications:  NPO Solid Status: > 8 hours             Airway   Mallampati: II  TM distance: >3 FB  Neck ROM: full  Dental    (+) edentulous    Pulmonary    (-) wheezes  Cardiovascular     Rhythm: regular    (+) hypertension, hyperlipidemia,   (-) murmur      Neuro/Psych  GI/Hepatic/Renal/Endo    (+)   renal disease, diabetes mellitus, thyroid problem     Musculoskeletal     Abdominal    Substance History      OB/GYN          Other                        Anesthesia Plan    ASA 3     MAC   (D/W pt. MAC and possible awareness intra op.  Pt understands MAC and GA are not the same and the possibility of GA being required for failed MAC)  intravenous induction           CODE STATUS:    Code Status (Patient has no pulse and is not breathing): CPR (Attempt to Resuscitate)  Medical Interventions (Patient has pulse or is breathing): Full Support

## 2022-03-29 LAB
ALBUMIN SERPL-MCNC: 3.4 G/DL (ref 3.5–5.2)
ALBUMIN/GLOB SERPL: 1.2 G/DL
ALP SERPL-CCNC: 82 U/L (ref 39–117)
ALT SERPL W P-5'-P-CCNC: 13 U/L (ref 1–33)
ANION GAP SERPL CALCULATED.3IONS-SCNC: 10 MMOL/L (ref 5–15)
AST SERPL-CCNC: 12 U/L (ref 1–32)
BACTERIA SPEC AEROBE CULT: ABNORMAL
BASOPHILS # BLD AUTO: 0.07 10*3/MM3 (ref 0–0.2)
BASOPHILS NFR BLD AUTO: 0.5 % (ref 0–1.5)
BILIRUB SERPL-MCNC: 0.3 MG/DL (ref 0–1.2)
BUN SERPL-MCNC: 17 MG/DL (ref 8–23)
BUN/CREAT SERPL: 18.1 (ref 7–25)
CALCIUM SPEC-SCNC: 8.2 MG/DL (ref 8.6–10.5)
CHLORIDE SERPL-SCNC: 105 MMOL/L (ref 98–107)
CO2 SERPL-SCNC: 26 MMOL/L (ref 22–29)
CREAT SERPL-MCNC: 0.94 MG/DL (ref 0.57–1)
DEPRECATED RDW RBC AUTO: 48.9 FL (ref 37–54)
EGFRCR SERPLBLD CKD-EPI 2021: 61.1 ML/MIN/1.73
EOSINOPHIL # BLD AUTO: 0.58 10*3/MM3 (ref 0–0.4)
EOSINOPHIL NFR BLD AUTO: 4.3 % (ref 0.3–6.2)
ERYTHROCYTE [DISTWIDTH] IN BLOOD BY AUTOMATED COUNT: 14.7 % (ref 12.3–15.4)
GLOBULIN UR ELPH-MCNC: 2.9 GM/DL
GLUCOSE BLDC GLUCOMTR-MCNC: 150 MG/DL (ref 70–130)
GLUCOSE BLDC GLUCOMTR-MCNC: 155 MG/DL (ref 70–130)
GLUCOSE BLDC GLUCOMTR-MCNC: 168 MG/DL (ref 70–130)
GLUCOSE BLDC GLUCOMTR-MCNC: 208 MG/DL (ref 70–130)
GLUCOSE SERPL-MCNC: 152 MG/DL (ref 65–99)
HCT VFR BLD AUTO: 35.2 % (ref 34–46.6)
HGB BLD-MCNC: 11.4 G/DL (ref 12–15.9)
IMM GRANULOCYTES # BLD AUTO: 0.07 10*3/MM3 (ref 0–0.05)
IMM GRANULOCYTES NFR BLD AUTO: 0.5 % (ref 0–0.5)
LYMPHOCYTES # BLD AUTO: 1.21 10*3/MM3 (ref 0.7–3.1)
LYMPHOCYTES NFR BLD AUTO: 8.9 % (ref 19.6–45.3)
MAGNESIUM SERPL-MCNC: 2.2 MG/DL (ref 1.6–2.4)
MCH RBC QN AUTO: 29 PG (ref 26.6–33)
MCHC RBC AUTO-ENTMCNC: 32.4 G/DL (ref 31.5–35.7)
MCV RBC AUTO: 89.6 FL (ref 79–97)
MONOCYTES # BLD AUTO: 1.05 10*3/MM3 (ref 0.1–0.9)
MONOCYTES NFR BLD AUTO: 7.7 % (ref 5–12)
NEUTROPHILS NFR BLD AUTO: 10.64 10*3/MM3 (ref 1.7–7)
NEUTROPHILS NFR BLD AUTO: 78.1 % (ref 42.7–76)
NRBC BLD AUTO-RTO: 0 /100 WBC (ref 0–0.2)
PLATELET # BLD AUTO: 296 10*3/MM3 (ref 140–450)
PMV BLD AUTO: 9.6 FL (ref 6–12)
POTASSIUM SERPL-SCNC: 3.9 MMOL/L (ref 3.5–5.2)
PROT SERPL-MCNC: 6.3 G/DL (ref 6–8.5)
RBC # BLD AUTO: 3.93 10*6/MM3 (ref 3.77–5.28)
SODIUM SERPL-SCNC: 141 MMOL/L (ref 136–145)
WBC NRBC COR # BLD: 13.62 10*3/MM3 (ref 3.4–10.8)

## 2022-03-29 PROCEDURE — 25010000002 ENOXAPARIN PER 10 MG: Performed by: STUDENT IN AN ORGANIZED HEALTH CARE EDUCATION/TRAINING PROGRAM

## 2022-03-29 PROCEDURE — 85025 COMPLETE CBC W/AUTO DIFF WBC: CPT | Performed by: HOSPITALIST

## 2022-03-29 PROCEDURE — 82962 GLUCOSE BLOOD TEST: CPT

## 2022-03-29 PROCEDURE — 99223 1ST HOSP IP/OBS HIGH 75: CPT | Performed by: INTERNAL MEDICINE

## 2022-03-29 PROCEDURE — 80053 COMPREHEN METABOLIC PANEL: CPT | Performed by: HOSPITALIST

## 2022-03-29 PROCEDURE — 25010000002 ONDANSETRON PER 1 MG: Performed by: STUDENT IN AN ORGANIZED HEALTH CARE EDUCATION/TRAINING PROGRAM

## 2022-03-29 PROCEDURE — 63710000001 INSULIN LISPRO (HUMAN) PER 5 UNITS: Performed by: STUDENT IN AN ORGANIZED HEALTH CARE EDUCATION/TRAINING PROGRAM

## 2022-03-29 PROCEDURE — 83735 ASSAY OF MAGNESIUM: CPT | Performed by: HOSPITALIST

## 2022-03-29 PROCEDURE — 25010000002 CEFTRIAXONE PER 250 MG: Performed by: HOSPITALIST

## 2022-03-29 RX ORDER — LISINOPRIL 20 MG/1
20 TABLET ORAL
Status: DISCONTINUED | OUTPATIENT
Start: 2022-03-30 | End: 2022-03-31 | Stop reason: HOSPADM

## 2022-03-29 RX ORDER — L.ACID,PARA/B.BIFIDUM/S.THERM 8B CELL
1 CAPSULE ORAL DAILY
Status: DISCONTINUED | OUTPATIENT
Start: 2022-03-29 | End: 2022-03-31 | Stop reason: HOSPADM

## 2022-03-29 RX ORDER — LISINOPRIL 10 MG/1
10 TABLET ORAL ONCE
Status: COMPLETED | OUTPATIENT
Start: 2022-03-29 | End: 2022-03-29

## 2022-03-29 RX ADMIN — PREGABALIN 50 MG: 50 CAPSULE ORAL at 08:30

## 2022-03-29 RX ADMIN — FAMOTIDINE 20 MG: 20 TABLET ORAL at 18:14

## 2022-03-29 RX ADMIN — ACETAMINOPHEN 650 MG: 325 TABLET, FILM COATED ORAL at 00:10

## 2022-03-29 RX ADMIN — ATORVASTATIN CALCIUM 80 MG: 80 TABLET, FILM COATED ORAL at 22:01

## 2022-03-29 RX ADMIN — PREGABALIN 50 MG: 50 CAPSULE ORAL at 15:19

## 2022-03-29 RX ADMIN — BISOPROLOL FUMARATE 2.5 MG: 5 TABLET, FILM COATED ORAL at 08:30

## 2022-03-29 RX ADMIN — Medication 10 ML: at 22:01

## 2022-03-29 RX ADMIN — ONDANSETRON 4 MG: 2 INJECTION INTRAMUSCULAR; INTRAVENOUS at 12:05

## 2022-03-29 RX ADMIN — INSULIN LISPRO 4 UNITS: 100 INJECTION, SOLUTION INTRAVENOUS; SUBCUTANEOUS at 12:04

## 2022-03-29 RX ADMIN — ONDANSETRON 4 MG: 2 INJECTION INTRAMUSCULAR; INTRAVENOUS at 04:19

## 2022-03-29 RX ADMIN — LISINOPRIL 10 MG: 10 TABLET ORAL at 08:30

## 2022-03-29 RX ADMIN — Medication 1 CAPSULE: at 15:19

## 2022-03-29 RX ADMIN — CHOLESTYRAMINE 1 PACKET: 4 POWDER, FOR SUSPENSION ORAL at 08:30

## 2022-03-29 RX ADMIN — INSULIN LISPRO 2 UNITS: 100 INJECTION, SOLUTION INTRAVENOUS; SUBCUTANEOUS at 18:14

## 2022-03-29 RX ADMIN — Medication 10 ML: at 08:33

## 2022-03-29 RX ADMIN — HYDROCHLOROTHIAZIDE 12.5 MG: 12.5 CAPSULE ORAL at 08:30

## 2022-03-29 RX ADMIN — INSULIN GLARGINE-YFGN 30 UNITS: 100 INJECTION, SOLUTION SUBCUTANEOUS at 22:01

## 2022-03-29 RX ADMIN — LEVOTHYROXINE SODIUM 112 MCG: 0.11 TABLET ORAL at 06:38

## 2022-03-29 RX ADMIN — HYDROCODONE BITARTRATE AND ACETAMINOPHEN 1 TABLET: 5; 325 TABLET ORAL at 22:01

## 2022-03-29 RX ADMIN — PREGABALIN 50 MG: 50 CAPSULE ORAL at 22:01

## 2022-03-29 RX ADMIN — INSULIN LISPRO 2 UNITS: 100 INJECTION, SOLUTION INTRAVENOUS; SUBCUTANEOUS at 08:30

## 2022-03-29 RX ADMIN — LISINOPRIL 10 MG: 10 TABLET ORAL at 15:19

## 2022-03-29 RX ADMIN — HYDROCODONE BITARTRATE AND ACETAMINOPHEN 1 TABLET: 5; 325 TABLET ORAL at 08:57

## 2022-03-29 RX ADMIN — ENOXAPARIN SODIUM 40 MG: 100 INJECTION SUBCUTANEOUS at 18:14

## 2022-03-29 RX ADMIN — SODIUM CHLORIDE 100 ML/HR: 9 INJECTION, SOLUTION INTRAVENOUS at 00:07

## 2022-03-30 LAB
ALBUMIN SERPL-MCNC: 2.9 G/DL (ref 3.5–5.2)
ALBUMIN/GLOB SERPL: 0.9 G/DL
ALP SERPL-CCNC: 77 U/L (ref 39–117)
ALT SERPL W P-5'-P-CCNC: 6 U/L (ref 1–33)
ANION GAP SERPL CALCULATED.3IONS-SCNC: 20.8 MMOL/L (ref 5–15)
AST SERPL-CCNC: 10 U/L (ref 1–32)
BASOPHILS # BLD AUTO: 0.06 10*3/MM3 (ref 0–0.2)
BASOPHILS NFR BLD AUTO: 0.5 % (ref 0–1.5)
BILIRUB SERPL-MCNC: 0.3 MG/DL (ref 0–1.2)
BUN SERPL-MCNC: 13 MG/DL (ref 8–23)
BUN/CREAT SERPL: 13 (ref 7–25)
CALCIUM SPEC-SCNC: 8.3 MG/DL (ref 8.6–10.5)
CHLORIDE SERPL-SCNC: 101 MMOL/L (ref 98–107)
CO2 SERPL-SCNC: 22.2 MMOL/L (ref 22–29)
CREAT SERPL-MCNC: 1 MG/DL (ref 0.57–1)
DEPRECATED RDW RBC AUTO: 46.4 FL (ref 37–54)
EGFRCR SERPLBLD CKD-EPI 2021: 56.7 ML/MIN/1.73
EOSINOPHIL # BLD AUTO: 0.65 10*3/MM3 (ref 0–0.4)
EOSINOPHIL NFR BLD AUTO: 5.4 % (ref 0.3–6.2)
ERYTHROCYTE [DISTWIDTH] IN BLOOD BY AUTOMATED COUNT: 14.6 % (ref 12.3–15.4)
GLOBULIN UR ELPH-MCNC: 3.2 GM/DL
GLUCOSE BLDC GLUCOMTR-MCNC: 125 MG/DL (ref 70–130)
GLUCOSE BLDC GLUCOMTR-MCNC: 143 MG/DL (ref 70–130)
GLUCOSE BLDC GLUCOMTR-MCNC: 181 MG/DL (ref 70–130)
GLUCOSE BLDC GLUCOMTR-MCNC: 194 MG/DL (ref 70–130)
GLUCOSE SERPL-MCNC: 128 MG/DL (ref 65–99)
HCT VFR BLD AUTO: 33.8 % (ref 34–46.6)
HGB BLD-MCNC: 11.1 G/DL (ref 12–15.9)
IMM GRANULOCYTES # BLD AUTO: 0.07 10*3/MM3 (ref 0–0.05)
IMM GRANULOCYTES NFR BLD AUTO: 0.6 % (ref 0–0.5)
LAB AP CASE REPORT: NORMAL
LAB AP CLINICAL INFORMATION: NORMAL
LYMPHOCYTES # BLD AUTO: 1.99 10*3/MM3 (ref 0.7–3.1)
LYMPHOCYTES NFR BLD AUTO: 16.4 % (ref 19.6–45.3)
MAGNESIUM SERPL-MCNC: 1.8 MG/DL (ref 1.6–2.4)
MCH RBC QN AUTO: 28.5 PG (ref 26.6–33)
MCHC RBC AUTO-ENTMCNC: 32.8 G/DL (ref 31.5–35.7)
MCV RBC AUTO: 86.7 FL (ref 79–97)
MONOCYTES # BLD AUTO: 0.89 10*3/MM3 (ref 0.1–0.9)
MONOCYTES NFR BLD AUTO: 7.3 % (ref 5–12)
NEUTROPHILS NFR BLD AUTO: 69.8 % (ref 42.7–76)
NEUTROPHILS NFR BLD AUTO: 8.48 10*3/MM3 (ref 1.7–7)
NRBC BLD AUTO-RTO: 0 /100 WBC (ref 0–0.2)
PATH REPORT.FINAL DX SPEC: NORMAL
PATH REPORT.GROSS SPEC: NORMAL
PLATELET # BLD AUTO: 278 10*3/MM3 (ref 140–450)
PMV BLD AUTO: 9.9 FL (ref 6–12)
POTASSIUM SERPL-SCNC: 3.7 MMOL/L (ref 3.5–5.2)
PROT SERPL-MCNC: 6.1 G/DL (ref 6–8.5)
RBC # BLD AUTO: 3.9 10*6/MM3 (ref 3.77–5.28)
SODIUM SERPL-SCNC: 144 MMOL/L (ref 136–145)
WBC NRBC COR # BLD: 12.14 10*3/MM3 (ref 3.4–10.8)

## 2022-03-30 PROCEDURE — 99232 SBSQ HOSP IP/OBS MODERATE 35: CPT | Performed by: INTERNAL MEDICINE

## 2022-03-30 PROCEDURE — 82962 GLUCOSE BLOOD TEST: CPT

## 2022-03-30 PROCEDURE — 80053 COMPREHEN METABOLIC PANEL: CPT | Performed by: INTERNAL MEDICINE

## 2022-03-30 PROCEDURE — 25010000002 ENOXAPARIN PER 10 MG: Performed by: STUDENT IN AN ORGANIZED HEALTH CARE EDUCATION/TRAINING PROGRAM

## 2022-03-30 PROCEDURE — 83735 ASSAY OF MAGNESIUM: CPT | Performed by: HOSPITALIST

## 2022-03-30 PROCEDURE — 63710000001 INSULIN LISPRO (HUMAN) PER 5 UNITS: Performed by: STUDENT IN AN ORGANIZED HEALTH CARE EDUCATION/TRAINING PROGRAM

## 2022-03-30 PROCEDURE — 85025 COMPLETE CBC W/AUTO DIFF WBC: CPT | Performed by: INTERNAL MEDICINE

## 2022-03-30 PROCEDURE — 25010000002 MORPHINE PER 10 MG: Performed by: STUDENT IN AN ORGANIZED HEALTH CARE EDUCATION/TRAINING PROGRAM

## 2022-03-30 PROCEDURE — 25010000002 CEFTRIAXONE PER 250 MG: Performed by: HOSPITALIST

## 2022-03-30 PROCEDURE — 97116 GAIT TRAINING THERAPY: CPT

## 2022-03-30 PROCEDURE — 97530 THERAPEUTIC ACTIVITIES: CPT

## 2022-03-30 RX ORDER — AMLODIPINE BESYLATE 5 MG/1
5 TABLET ORAL
Status: DISCONTINUED | OUTPATIENT
Start: 2022-03-30 | End: 2022-03-31 | Stop reason: HOSPADM

## 2022-03-30 RX ORDER — CALCIUM CARBONATE 200(500)MG
2 TABLET,CHEWABLE ORAL 3 TIMES DAILY PRN
Status: DISCONTINUED | OUTPATIENT
Start: 2022-03-30 | End: 2022-03-31 | Stop reason: HOSPADM

## 2022-03-30 RX ORDER — FAMOTIDINE 20 MG/1
20 TABLET, FILM COATED ORAL
Status: DISCONTINUED | OUTPATIENT
Start: 2022-03-30 | End: 2022-03-31 | Stop reason: HOSPADM

## 2022-03-30 RX ADMIN — INSULIN LISPRO 2 UNITS: 100 INJECTION, SOLUTION INTRAVENOUS; SUBCUTANEOUS at 12:18

## 2022-03-30 RX ADMIN — PREGABALIN 50 MG: 50 CAPSULE ORAL at 20:30

## 2022-03-30 RX ADMIN — HYDROCHLOROTHIAZIDE 12.5 MG: 12.5 CAPSULE ORAL at 08:30

## 2022-03-30 RX ADMIN — PREGABALIN 50 MG: 50 CAPSULE ORAL at 08:30

## 2022-03-30 RX ADMIN — LISINOPRIL 20 MG: 20 TABLET ORAL at 08:30

## 2022-03-30 RX ADMIN — BISOPROLOL FUMARATE 2.5 MG: 5 TABLET, FILM COATED ORAL at 08:30

## 2022-03-30 RX ADMIN — PREGABALIN 50 MG: 50 CAPSULE ORAL at 16:40

## 2022-03-30 RX ADMIN — FAMOTIDINE 20 MG: 20 TABLET ORAL at 16:40

## 2022-03-30 RX ADMIN — CHOLESTYRAMINE 1 PACKET: 4 POWDER, FOR SUSPENSION ORAL at 08:30

## 2022-03-30 RX ADMIN — LEVOTHYROXINE SODIUM 112 MCG: 0.11 TABLET ORAL at 05:52

## 2022-03-30 RX ADMIN — INSULIN GLARGINE-YFGN 30 UNITS: 100 INJECTION, SOLUTION SUBCUTANEOUS at 20:31

## 2022-03-30 RX ADMIN — ENOXAPARIN SODIUM 40 MG: 100 INJECTION SUBCUTANEOUS at 16:40

## 2022-03-30 RX ADMIN — HYDROCODONE BITARTRATE AND ACETAMINOPHEN 1 TABLET: 5; 325 TABLET ORAL at 20:30

## 2022-03-30 RX ADMIN — ATORVASTATIN CALCIUM 80 MG: 80 TABLET, FILM COATED ORAL at 20:30

## 2022-03-30 RX ADMIN — Medication 1 CAPSULE: at 08:30

## 2022-03-30 RX ADMIN — HYDROCODONE BITARTRATE AND ACETAMINOPHEN 1 TABLET: 5; 325 TABLET ORAL at 15:10

## 2022-03-30 RX ADMIN — MORPHINE SULFATE 4 MG: 2 INJECTION, SOLUTION INTRAMUSCULAR; INTRAVENOUS at 16:40

## 2022-03-30 RX ADMIN — AMLODIPINE BESYLATE 5 MG: 5 TABLET ORAL at 17:26

## 2022-03-30 RX ADMIN — CEFTRIAXONE 1 G: 1 INJECTION, POWDER, FOR SOLUTION INTRAMUSCULAR; INTRAVENOUS at 12:18

## 2022-03-30 RX ADMIN — Medication 10 ML: at 20:30

## 2022-03-30 NOTE — ANESTHESIA POSTPROCEDURE EVALUATION
Patient: Halie Bricemerle    Procedure Summary     Date: 03/28/22 [facility]    Anesthesia Start: 1708 Anesthesia Stop: 1743    Procedure: ENDOSCOPIC RETROGRADE CHOLANGIOPANCREATOGRAPHY WITH SPHINCTEROTOMY AND BALLOON SWEEP (N/A ) Diagnosis:       Choledocholithiasis      (Choledocholithiasis [K80.50])    Surgeons: Chilo Wilhelm MD Provider: Car Fernando MD    Anesthesia Type: MAC ASA Status: 3          Anesthesia Type: MAC    Vitals  Vitals Value Taken Time   /83 03/28/22 1811   Temp     Pulse 62 03/28/22 1811   Resp 18 03/28/22 1811   SpO2 97 % 03/28/22 1811           Post Anesthesia Care and Evaluation    No anesthesia care post op

## 2022-03-31 ENCOUNTER — READMISSION MANAGEMENT (OUTPATIENT)
Dept: CALL CENTER | Facility: HOSPITAL | Age: 82
End: 2022-03-31

## 2022-03-31 VITALS
HEIGHT: 68 IN | RESPIRATION RATE: 18 BRPM | TEMPERATURE: 97.6 F | SYSTOLIC BLOOD PRESSURE: 168 MMHG | DIASTOLIC BLOOD PRESSURE: 70 MMHG | OXYGEN SATURATION: 92 % | WEIGHT: 185 LBS | HEART RATE: 59 BPM | BODY MASS INDEX: 28.04 KG/M2

## 2022-03-31 LAB
ALBUMIN SERPL-MCNC: 3.3 G/DL (ref 3.5–5.2)
ALBUMIN/GLOB SERPL: 1.2 G/DL
ALP SERPL-CCNC: 76 U/L (ref 39–117)
ALT SERPL W P-5'-P-CCNC: 7 U/L (ref 1–33)
ANION GAP SERPL CALCULATED.3IONS-SCNC: 10 MMOL/L (ref 5–15)
AST SERPL-CCNC: 8 U/L (ref 1–32)
BACTERIA SPEC AEROBE CULT: NORMAL
BACTERIA SPEC AEROBE CULT: NORMAL
BASOPHILS # BLD AUTO: 0.06 10*3/MM3 (ref 0–0.2)
BASOPHILS NFR BLD AUTO: 0.6 % (ref 0–1.5)
BILIRUB SERPL-MCNC: 0.2 MG/DL (ref 0–1.2)
BUN SERPL-MCNC: 17 MG/DL (ref 8–23)
BUN/CREAT SERPL: 18.5 (ref 7–25)
CALCIUM SPEC-SCNC: 8.5 MG/DL (ref 8.6–10.5)
CHLORIDE SERPL-SCNC: 105 MMOL/L (ref 98–107)
CO2 SERPL-SCNC: 24 MMOL/L (ref 22–29)
CREAT SERPL-MCNC: 0.92 MG/DL (ref 0.57–1)
DEPRECATED RDW RBC AUTO: 47.6 FL (ref 37–54)
EGFRCR SERPLBLD CKD-EPI 2021: 62.7 ML/MIN/1.73
EOSINOPHIL # BLD AUTO: 0.57 10*3/MM3 (ref 0–0.4)
EOSINOPHIL NFR BLD AUTO: 5.8 % (ref 0.3–6.2)
ERYTHROCYTE [DISTWIDTH] IN BLOOD BY AUTOMATED COUNT: 14.6 % (ref 12.3–15.4)
GLOBULIN UR ELPH-MCNC: 2.8 GM/DL
GLUCOSE BLDC GLUCOMTR-MCNC: 151 MG/DL (ref 70–130)
GLUCOSE BLDC GLUCOMTR-MCNC: 177 MG/DL (ref 70–130)
GLUCOSE SERPL-MCNC: 144 MG/DL (ref 65–99)
HCT VFR BLD AUTO: 34.8 % (ref 34–46.6)
HGB BLD-MCNC: 11.3 G/DL (ref 12–15.9)
IMM GRANULOCYTES # BLD AUTO: 0.05 10*3/MM3 (ref 0–0.05)
IMM GRANULOCYTES NFR BLD AUTO: 0.5 % (ref 0–0.5)
LYMPHOCYTES # BLD AUTO: 2.04 10*3/MM3 (ref 0.7–3.1)
LYMPHOCYTES NFR BLD AUTO: 20.9 % (ref 19.6–45.3)
MAGNESIUM SERPL-MCNC: 1.8 MG/DL (ref 1.6–2.4)
MCH RBC QN AUTO: 28.8 PG (ref 26.6–33)
MCHC RBC AUTO-ENTMCNC: 32.5 G/DL (ref 31.5–35.7)
MCV RBC AUTO: 88.5 FL (ref 79–97)
MONOCYTES # BLD AUTO: 0.77 10*3/MM3 (ref 0.1–0.9)
MONOCYTES NFR BLD AUTO: 7.9 % (ref 5–12)
NEUTROPHILS NFR BLD AUTO: 6.27 10*3/MM3 (ref 1.7–7)
NEUTROPHILS NFR BLD AUTO: 64.3 % (ref 42.7–76)
NRBC BLD AUTO-RTO: 0 /100 WBC (ref 0–0.2)
PLATELET # BLD AUTO: 292 10*3/MM3 (ref 140–450)
PMV BLD AUTO: 9.7 FL (ref 6–12)
POTASSIUM SERPL-SCNC: 3.7 MMOL/L (ref 3.5–5.2)
PROT SERPL-MCNC: 6.1 G/DL (ref 6–8.5)
RBC # BLD AUTO: 3.93 10*6/MM3 (ref 3.77–5.28)
SODIUM SERPL-SCNC: 139 MMOL/L (ref 136–145)
WBC NRBC COR # BLD: 9.76 10*3/MM3 (ref 3.4–10.8)

## 2022-03-31 PROCEDURE — 25010000002 CEFTRIAXONE PER 250 MG: Performed by: HOSPITALIST

## 2022-03-31 PROCEDURE — 83735 ASSAY OF MAGNESIUM: CPT | Performed by: HOSPITALIST

## 2022-03-31 PROCEDURE — 85025 COMPLETE CBC W/AUTO DIFF WBC: CPT | Performed by: HOSPITALIST

## 2022-03-31 PROCEDURE — 80053 COMPREHEN METABOLIC PANEL: CPT | Performed by: HOSPITALIST

## 2022-03-31 PROCEDURE — 82962 GLUCOSE BLOOD TEST: CPT

## 2022-03-31 PROCEDURE — 97535 SELF CARE MNGMENT TRAINING: CPT

## 2022-03-31 PROCEDURE — 63710000001 INSULIN LISPRO (HUMAN) PER 5 UNITS: Performed by: STUDENT IN AN ORGANIZED HEALTH CARE EDUCATION/TRAINING PROGRAM

## 2022-03-31 RX ORDER — BISOPROLOL FUMARATE 5 MG/1
2.5 TABLET, FILM COATED ORAL DAILY
Qty: 90 TABLET | Refills: 3 | Status: SHIPPED | OUTPATIENT
Start: 2022-03-31 | End: 2022-09-06 | Stop reason: HOSPADM

## 2022-03-31 RX ORDER — L.ACID,PARA/B.BIFIDUM/S.THERM 8B CELL
1 CAPSULE ORAL DAILY
Qty: 30 CAPSULE | Refills: 0 | Status: SHIPPED | OUTPATIENT
Start: 2022-04-01 | End: 2022-05-01

## 2022-03-31 RX ORDER — AMLODIPINE BESYLATE 5 MG/1
5 TABLET ORAL
Qty: 30 TABLET | Refills: 0 | Status: SHIPPED | OUTPATIENT
Start: 2022-04-01 | End: 2022-05-01

## 2022-03-31 RX ORDER — LISINOPRIL 20 MG/1
20 TABLET ORAL DAILY
Qty: 30 TABLET | Refills: 0 | Status: ON HOLD | OUTPATIENT
Start: 2022-03-31 | End: 2022-06-24

## 2022-03-31 RX ADMIN — BISOPROLOL FUMARATE 2.5 MG: 5 TABLET, FILM COATED ORAL at 08:21

## 2022-03-31 RX ADMIN — HYDROCODONE BITARTRATE AND ACETAMINOPHEN 1 TABLET: 5; 325 TABLET ORAL at 06:04

## 2022-03-31 RX ADMIN — FAMOTIDINE 20 MG: 20 TABLET ORAL at 08:21

## 2022-03-31 RX ADMIN — HYDROCHLOROTHIAZIDE 12.5 MG: 12.5 CAPSULE ORAL at 08:21

## 2022-03-31 RX ADMIN — AMLODIPINE BESYLATE 5 MG: 5 TABLET ORAL at 08:21

## 2022-03-31 RX ADMIN — PREGABALIN 50 MG: 50 CAPSULE ORAL at 08:21

## 2022-03-31 RX ADMIN — Medication 1 CAPSULE: at 08:21

## 2022-03-31 RX ADMIN — CHOLESTYRAMINE 1 PACKET: 4 POWDER, FOR SUSPENSION ORAL at 08:21

## 2022-03-31 RX ADMIN — INSULIN LISPRO 2 UNITS: 100 INJECTION, SOLUTION INTRAVENOUS; SUBCUTANEOUS at 08:21

## 2022-03-31 RX ADMIN — LISINOPRIL 20 MG: 20 TABLET ORAL at 08:21

## 2022-03-31 RX ADMIN — LEVOTHYROXINE SODIUM 112 MCG: 0.11 TABLET ORAL at 06:04

## 2022-03-31 RX ADMIN — CEFTRIAXONE 1 G: 1 INJECTION, POWDER, FOR SOLUTION INTRAMUSCULAR; INTRAVENOUS at 11:40

## 2022-03-31 NOTE — OUTREACH NOTE
Prep Survey    Flowsheet Row Responses   [facility] facility patient discharged from? [place]   Is LACE score < 7 ? No   Emergency Room discharge w/ pulse ox? No   Eligibility [facility]   Date of Admission 03/26/22   Date of Discharge 03/31/22   Discharge Disposition Home-Health Care Svc   Discharge diagnosis ENDOSCOPIC RETROGRADE CHOLANGIOPANCREATOGRAPHY WITH SPHINCTEROTOMY AND BALLOON SWEEP   Does the patient have one of the following disease processes/diagnoses(primary or secondary)? General Surgery   Does the patient have Home health ordered? No   Is there a DME ordered? No   Prep survey completed? Yes          JANI FOUNTAIN - Registered Nurse

## 2022-04-01 ENCOUNTER — TRANSITIONAL CARE MANAGEMENT TELEPHONE ENCOUNTER (OUTPATIENT)
Dept: CALL CENTER | Facility: HOSPITAL | Age: 82
End: 2022-04-01

## 2022-04-01 NOTE — OUTREACH NOTE
Call Center TCM Note    Flowsheet Row Responses   [facility] patient discharged from? [place]   Does the patient have one of the following disease processes/diagnoses(primary or secondary)? General Surgery   TCM attempt successful? Yes  [NO current verbal release for PCP office]   Call start time 1118   Call end time 1121   Discharge diagnosis ENDOSCOPIC RETROGRADE CHOLANGIOPANCREATOGRAPHY WITH SPHINCTEROTOMY AND BALLOON SWEEP   Meds reviewed with patient/caregiver? Yes   Is the patient having any side effects they believe may be caused by any medication additions or changes? No   Does the patient have all medications related to this admission filled (includes all antibiotics, pain medications, etc.) Yes   Is the patient taking all medications as directed (includes completed medication regime)? Yes   Does the patient have a follow up appointment scheduled with their surgeon? Yes  [4/13/22]   Has the patient kept scheduled appointments due by today? N/A   Comments HOSP DC FU appt with PCP 4/5/22 @ 1:30 pm.    Has home health visited the patient within 72 hours of discharge? N/A   Psychosocial issues? No   Did the patient receive a copy of their discharge instructions? Yes   Nursing interventions Reviewed instructions with patient   What is the patient's perception of their health status since discharge? Improving   Nursing interventions Nurse provided patient education   Is the patient /caregiver able to teach back basic post-op care? Take showers only when approved by MD-sponge bathe until then, No tub bath, swimming, or hot tub until instructed by MD, Drive as instructed by MD in discharge instructions, Keep incision areas clean,dry and protected, Lifting as instructed by MD in discharge instructions, Practice 'cough and deep breath'   Is the patient/caregiver able to teach back signs and symptoms of incisional infection? Increased redness, swelling or pain at the incisonal site, Increased drainage or  bleeding, Incisional warmth, Pus or odor from incision, Fever   Is the patient/caregiver able to teach back steps to recovery at home? Eat a well-balance diet, Rest and rebuild strength, gradually increase activity, Set small, achievable goals for return to baseline health   If the patient is a current smoker, are they able to teach back resources for cessation? Not a smoker   Is the patient/caregiver able to teach back the hierarchy of who to call/visit for symptoms/problems? PCP, Specialist, Home health nurse, Urgent Care, ED, 911 Yes   TCM call completed? Yes   Wrap up additional comments Pt doing well and has a fu with pcp office          Praveena Mon RN    4/1/2022, 11:22 EDT

## 2022-04-11 ENCOUNTER — READMISSION MANAGEMENT (OUTPATIENT)
Dept: CALL CENTER | Facility: HOSPITAL | Age: 82
End: 2022-04-11

## 2022-04-11 NOTE — OUTREACH NOTE
General Surgery Week 2 Survey    Flowsheet Row Responses   [facility] patient discharged from? [place]   Does the patient have one of the following disease processes/diagnoses(primary or secondary)? General Surgery   Week 2 attempt successful? No   Unsuccessful attempts Attempt 1          RAYNA THORNTON - Registered Nurse

## 2022-05-26 ENCOUNTER — APPOINTMENT (OUTPATIENT)
Dept: GENERAL RADIOLOGY | Facility: HOSPITAL | Age: 82
End: 2022-05-26

## 2022-05-26 ENCOUNTER — HOSPITAL ENCOUNTER (OUTPATIENT)
Facility: HOSPITAL | Age: 82
Setting detail: OBSERVATION
Discharge: HOME | End: 2022-05-28
Attending: EMERGENCY MEDICINE | Admitting: INTERNAL MEDICINE

## 2022-05-26 DIAGNOSIS — B37.2 YEAST INFECTION OF THE SKIN: ICD-10-CM

## 2022-05-26 DIAGNOSIS — R50.9 SUBJECTIVE FEVER: ICD-10-CM

## 2022-05-26 DIAGNOSIS — R52 BURNING PAIN: Primary | ICD-10-CM

## 2022-05-26 DIAGNOSIS — I10 HYPERTENSION, UNSPECIFIED TYPE: ICD-10-CM

## 2022-05-26 DIAGNOSIS — E86.0 DEHYDRATION: ICD-10-CM

## 2022-05-26 LAB
ALBUMIN SERPL-MCNC: 3.9 G/DL (ref 3.5–5.2)
ALBUMIN/GLOB SERPL: 1.1 G/DL
ALP SERPL-CCNC: 94 U/L (ref 39–117)
ALT SERPL W P-5'-P-CCNC: 9 U/L (ref 1–33)
ANION GAP SERPL CALCULATED.3IONS-SCNC: 14.2 MMOL/L (ref 5–15)
AST SERPL-CCNC: 13 U/L (ref 1–32)
B PARAPERT DNA SPEC QL NAA+PROBE: NOT DETECTED
B PERT DNA SPEC QL NAA+PROBE: NOT DETECTED
BACTERIA UR QL AUTO: NORMAL /HPF
BASOPHILS # BLD AUTO: 0.07 10*3/MM3 (ref 0–0.2)
BASOPHILS NFR BLD AUTO: 0.7 % (ref 0–1.5)
BILIRUB SERPL-MCNC: 0.4 MG/DL (ref 0–1.2)
BILIRUB UR QL STRIP: NEGATIVE
BUN SERPL-MCNC: 27 MG/DL (ref 8–23)
BUN/CREAT SERPL: 26.2 (ref 7–25)
C PNEUM DNA NPH QL NAA+NON-PROBE: NOT DETECTED
CALCIUM SPEC-SCNC: 9.8 MG/DL (ref 8.6–10.5)
CHLORIDE SERPL-SCNC: 104 MMOL/L (ref 98–107)
CLARITY UR: CLEAR
CO2 SERPL-SCNC: 23.8 MMOL/L (ref 22–29)
COLOR UR: YELLOW
CREAT SERPL-MCNC: 1.03 MG/DL (ref 0.57–1)
D-LACTATE SERPL-SCNC: 1 MMOL/L (ref 0.5–2)
DEPRECATED RDW RBC AUTO: 45.1 FL (ref 37–54)
EGFRCR SERPLBLD CKD-EPI 2021: 54.7 ML/MIN/1.73
EOSINOPHIL # BLD AUTO: 0.23 10*3/MM3 (ref 0–0.4)
EOSINOPHIL NFR BLD AUTO: 2.5 % (ref 0.3–6.2)
ERYTHROCYTE [DISTWIDTH] IN BLOOD BY AUTOMATED COUNT: 14.4 % (ref 12.3–15.4)
FLUAV SUBTYP SPEC NAA+PROBE: NOT DETECTED
FLUBV RNA ISLT QL NAA+PROBE: NOT DETECTED
GLOBULIN UR ELPH-MCNC: 3.7 GM/DL
GLUCOSE BLDC GLUCOMTR-MCNC: 112 MG/DL (ref 70–130)
GLUCOSE SERPL-MCNC: 98 MG/DL (ref 65–99)
GLUCOSE UR STRIP-MCNC: NEGATIVE MG/DL
HADV DNA SPEC NAA+PROBE: NOT DETECTED
HCOV 229E RNA SPEC QL NAA+PROBE: NOT DETECTED
HCOV HKU1 RNA SPEC QL NAA+PROBE: NOT DETECTED
HCOV NL63 RNA SPEC QL NAA+PROBE: NOT DETECTED
HCOV OC43 RNA SPEC QL NAA+PROBE: NOT DETECTED
HCT VFR BLD AUTO: 36.7 % (ref 34–46.6)
HGB BLD-MCNC: 12.4 G/DL (ref 12–15.9)
HGB UR QL STRIP.AUTO: NEGATIVE
HMPV RNA NPH QL NAA+NON-PROBE: NOT DETECTED
HPIV1 RNA ISLT QL NAA+PROBE: NOT DETECTED
HPIV2 RNA SPEC QL NAA+PROBE: NOT DETECTED
HPIV3 RNA NPH QL NAA+PROBE: NOT DETECTED
HPIV4 P GENE NPH QL NAA+PROBE: NOT DETECTED
HYALINE CASTS UR QL AUTO: NORMAL /LPF
IMM GRANULOCYTES # BLD AUTO: 0.04 10*3/MM3 (ref 0–0.05)
IMM GRANULOCYTES NFR BLD AUTO: 0.4 % (ref 0–0.5)
KETONES UR QL STRIP: NEGATIVE
LEUKOCYTE ESTERASE UR QL STRIP.AUTO: NEGATIVE
LIPASE SERPL-CCNC: 50 U/L (ref 13–60)
LYMPHOCYTES # BLD AUTO: 1.6 10*3/MM3 (ref 0.7–3.1)
LYMPHOCYTES NFR BLD AUTO: 17.1 % (ref 19.6–45.3)
M PNEUMO IGG SER IA-ACNC: NOT DETECTED
MCH RBC QN AUTO: 29 PG (ref 26.6–33)
MCHC RBC AUTO-ENTMCNC: 33.8 G/DL (ref 31.5–35.7)
MCV RBC AUTO: 85.9 FL (ref 79–97)
MONOCYTES # BLD AUTO: 0.73 10*3/MM3 (ref 0.1–0.9)
MONOCYTES NFR BLD AUTO: 7.8 % (ref 5–12)
NEUTROPHILS NFR BLD AUTO: 6.71 10*3/MM3 (ref 1.7–7)
NEUTROPHILS NFR BLD AUTO: 71.5 % (ref 42.7–76)
NITRITE UR QL STRIP: NEGATIVE
NRBC BLD AUTO-RTO: 0 /100 WBC (ref 0–0.2)
PH UR STRIP.AUTO: 6 [PH] (ref 5–8)
PLATELET # BLD AUTO: 284 10*3/MM3 (ref 140–450)
PMV BLD AUTO: 9.3 FL (ref 6–12)
POTASSIUM SERPL-SCNC: 3.3 MMOL/L (ref 3.5–5.2)
PROCALCITONIN SERPL-MCNC: 0.1 NG/ML (ref 0–0.25)
PROT SERPL-MCNC: 7.6 G/DL (ref 6–8.5)
PROT UR QL STRIP: ABNORMAL
RBC # BLD AUTO: 4.27 10*6/MM3 (ref 3.77–5.28)
RBC # UR STRIP: NORMAL /HPF
REF LAB TEST METHOD: NORMAL
RHINOVIRUS RNA SPEC NAA+PROBE: NOT DETECTED
RSV RNA NPH QL NAA+NON-PROBE: NOT DETECTED
S PYO AG THROAT QL: NEGATIVE
SARS-COV-2 RNA NPH QL NAA+NON-PROBE: NOT DETECTED
SODIUM SERPL-SCNC: 142 MMOL/L (ref 136–145)
SP GR UR STRIP: 1.02 (ref 1–1.03)
SQUAMOUS #/AREA URNS HPF: NORMAL /HPF
TROPONIN T SERPL-MCNC: <0.01 NG/ML (ref 0–0.03)
UROBILINOGEN UR QL STRIP: ABNORMAL
WBC # UR STRIP: NORMAL /HPF
WBC NRBC COR # BLD: 9.38 10*3/MM3 (ref 3.4–10.8)

## 2022-05-26 PROCEDURE — 96375 TX/PRO/DX INJ NEW DRUG ADDON: CPT

## 2022-05-26 PROCEDURE — 99285 EMERGENCY DEPT VISIT HI MDM: CPT

## 2022-05-26 PROCEDURE — 71045 X-RAY EXAM CHEST 1 VIEW: CPT

## 2022-05-26 PROCEDURE — 80053 COMPREHEN METABOLIC PANEL: CPT | Performed by: EMERGENCY MEDICINE

## 2022-05-26 PROCEDURE — 83605 ASSAY OF LACTIC ACID: CPT | Performed by: EMERGENCY MEDICINE

## 2022-05-26 PROCEDURE — 87040 BLOOD CULTURE FOR BACTERIA: CPT | Performed by: EMERGENCY MEDICINE

## 2022-05-26 PROCEDURE — 81001 URINALYSIS AUTO W/SCOPE: CPT | Performed by: EMERGENCY MEDICINE

## 2022-05-26 PROCEDURE — 87081 CULTURE SCREEN ONLY: CPT | Performed by: EMERGENCY MEDICINE

## 2022-05-26 PROCEDURE — 93005 ELECTROCARDIOGRAM TRACING: CPT | Performed by: EMERGENCY MEDICINE

## 2022-05-26 PROCEDURE — 83690 ASSAY OF LIPASE: CPT | Performed by: EMERGENCY MEDICINE

## 2022-05-26 PROCEDURE — 85025 COMPLETE CBC W/AUTO DIFF WBC: CPT | Performed by: EMERGENCY MEDICINE

## 2022-05-26 PROCEDURE — 25010000002 HYDRALAZINE PER 20 MG: Performed by: EMERGENCY MEDICINE

## 2022-05-26 PROCEDURE — 87880 STREP A ASSAY W/OPTIC: CPT | Performed by: EMERGENCY MEDICINE

## 2022-05-26 PROCEDURE — 82962 GLUCOSE BLOOD TEST: CPT

## 2022-05-26 PROCEDURE — 36415 COLL VENOUS BLD VENIPUNCTURE: CPT

## 2022-05-26 PROCEDURE — 93010 ELECTROCARDIOGRAM REPORT: CPT | Performed by: INTERNAL MEDICINE

## 2022-05-26 PROCEDURE — 84145 PROCALCITONIN (PCT): CPT | Performed by: EMERGENCY MEDICINE

## 2022-05-26 PROCEDURE — 0202U NFCT DS 22 TRGT SARS-COV-2: CPT | Performed by: EMERGENCY MEDICINE

## 2022-05-26 PROCEDURE — P9612 CATHETERIZE FOR URINE SPEC: HCPCS

## 2022-05-26 PROCEDURE — 84484 ASSAY OF TROPONIN QUANT: CPT | Performed by: EMERGENCY MEDICINE

## 2022-05-26 RX ORDER — HYDRALAZINE HYDROCHLORIDE 20 MG/ML
10 INJECTION INTRAMUSCULAR; INTRAVENOUS ONCE
Status: COMPLETED | OUTPATIENT
Start: 2022-05-26 | End: 2022-05-26

## 2022-05-26 RX ORDER — ACETAMINOPHEN 500 MG
1000 TABLET ORAL ONCE
Status: COMPLETED | OUTPATIENT
Start: 2022-05-26 | End: 2022-05-26

## 2022-05-26 RX ADMIN — ZINC OXIDE 1 APPLICATION: 200 OINTMENT TOPICAL at 22:26

## 2022-05-26 RX ADMIN — ACETAMINOPHEN 1000 MG: 500 TABLET ORAL at 22:30

## 2022-05-26 RX ADMIN — HYDRALAZINE HYDROCHLORIDE 10 MG: 20 INJECTION INTRAMUSCULAR; INTRAVENOUS at 21:33

## 2022-05-27 PROBLEM — Z91.148 NONCOMPLIANCE WITH MEDICATION REGIMEN: Status: ACTIVE | Noted: 2017-05-30

## 2022-05-27 PROBLEM — L30.4 INTERTRIGO: Status: ACTIVE | Noted: 2022-05-27

## 2022-05-27 PROBLEM — Z91.14 NONCOMPLIANCE WITH MEDICATION REGIMEN: Status: ACTIVE | Noted: 2017-05-30

## 2022-05-27 PROBLEM — F41.9 ANXIETY DISORDER: Status: ACTIVE | Noted: 2022-05-27

## 2022-05-27 PROBLEM — R52 BURNING PAIN: Status: ACTIVE | Noted: 2022-05-27

## 2022-05-27 PROBLEM — M79.2 NEUROPATHIC PAIN: Status: ACTIVE | Noted: 2022-05-27

## 2022-05-27 LAB
ANION GAP SERPL CALCULATED.3IONS-SCNC: 13 MMOL/L (ref 5–15)
BUN SERPL-MCNC: 24 MG/DL (ref 8–23)
BUN/CREAT SERPL: 22.2 (ref 7–25)
CALCIUM SPEC-SCNC: 8.9 MG/DL (ref 8.6–10.5)
CHLORIDE SERPL-SCNC: 102 MMOL/L (ref 98–107)
CO2 SERPL-SCNC: 24 MMOL/L (ref 22–29)
CREAT SERPL-MCNC: 1.08 MG/DL (ref 0.57–1)
CRP SERPL-MCNC: <0.3 MG/DL (ref 0–0.5)
DEPRECATED RDW RBC AUTO: 45.3 FL (ref 37–54)
EGFRCR SERPLBLD CKD-EPI 2021: 51.7 ML/MIN/1.73
ERYTHROCYTE [DISTWIDTH] IN BLOOD BY AUTOMATED COUNT: 14.1 % (ref 12.3–15.4)
ERYTHROCYTE [SEDIMENTATION RATE] IN BLOOD: 14 MM/HR (ref 0–30)
GLUCOSE BLDC GLUCOMTR-MCNC: 132 MG/DL (ref 70–130)
GLUCOSE BLDC GLUCOMTR-MCNC: 171 MG/DL (ref 70–130)
GLUCOSE BLDC GLUCOMTR-MCNC: 201 MG/DL (ref 70–130)
GLUCOSE BLDC GLUCOMTR-MCNC: 247 MG/DL (ref 70–130)
GLUCOSE SERPL-MCNC: 156 MG/DL (ref 65–99)
HBA1C MFR BLD: 7.4 % (ref 4.8–5.6)
HCT VFR BLD AUTO: 35.9 % (ref 34–46.6)
HGB BLD-MCNC: 11.8 G/DL (ref 12–15.9)
MAGNESIUM SERPL-MCNC: 1.8 MG/DL (ref 1.6–2.4)
MCH RBC QN AUTO: 28.7 PG (ref 26.6–33)
MCHC RBC AUTO-ENTMCNC: 32.9 G/DL (ref 31.5–35.7)
MCV RBC AUTO: 87.3 FL (ref 79–97)
PLATELET # BLD AUTO: 269 10*3/MM3 (ref 140–450)
PMV BLD AUTO: 9.4 FL (ref 6–12)
POTASSIUM SERPL-SCNC: 3 MMOL/L (ref 3.5–5.2)
QT INTERVAL: 458 MS
RBC # BLD AUTO: 4.11 10*6/MM3 (ref 3.77–5.28)
SODIUM SERPL-SCNC: 139 MMOL/L (ref 136–145)
TSH SERPL DL<=0.05 MIU/L-ACNC: 1.15 UIU/ML (ref 0.27–4.2)
WBC NRBC COR # BLD: 7.95 10*3/MM3 (ref 3.4–10.8)

## 2022-05-27 PROCEDURE — G0378 HOSPITAL OBSERVATION PER HR: HCPCS

## 2022-05-27 PROCEDURE — 63710000001 INSULIN LISPRO (HUMAN) PER 5 UNITS: Performed by: INTERNAL MEDICINE

## 2022-05-27 PROCEDURE — 80048 BASIC METABOLIC PNL TOTAL CA: CPT | Performed by: NURSE PRACTITIONER

## 2022-05-27 PROCEDURE — 83036 HEMOGLOBIN GLYCOSYLATED A1C: CPT | Performed by: NURSE PRACTITIONER

## 2022-05-27 PROCEDURE — 36415 COLL VENOUS BLD VENIPUNCTURE: CPT | Performed by: NURSE PRACTITIONER

## 2022-05-27 PROCEDURE — 90791 PSYCH DIAGNOSTIC EVALUATION: CPT

## 2022-05-27 PROCEDURE — 84443 ASSAY THYROID STIM HORMONE: CPT | Performed by: NURSE PRACTITIONER

## 2022-05-27 PROCEDURE — 63710000001 INSULIN LISPRO (HUMAN) PER 5 UNITS: Performed by: NURSE PRACTITIONER

## 2022-05-27 PROCEDURE — 25010000002 KETOROLAC TROMETHAMINE PER 15 MG: Performed by: EMERGENCY MEDICINE

## 2022-05-27 PROCEDURE — 82962 GLUCOSE BLOOD TEST: CPT

## 2022-05-27 PROCEDURE — 86140 C-REACTIVE PROTEIN: CPT | Performed by: INTERNAL MEDICINE

## 2022-05-27 PROCEDURE — 85027 COMPLETE CBC AUTOMATED: CPT | Performed by: NURSE PRACTITIONER

## 2022-05-27 PROCEDURE — 83735 ASSAY OF MAGNESIUM: CPT | Performed by: INTERNAL MEDICINE

## 2022-05-27 PROCEDURE — 25010000002 MAGNESIUM SULFATE IN D5W 1G/100ML (PREMIX) 1-5 GM/100ML-% SOLUTION: Performed by: INTERNAL MEDICINE

## 2022-05-27 PROCEDURE — 96375 TX/PRO/DX INJ NEW DRUG ADDON: CPT

## 2022-05-27 PROCEDURE — 85652 RBC SED RATE AUTOMATED: CPT | Performed by: INTERNAL MEDICINE

## 2022-05-27 PROCEDURE — 96365 THER/PROPH/DIAG IV INF INIT: CPT

## 2022-05-27 RX ORDER — ONDANSETRON 2 MG/ML
4 INJECTION INTRAMUSCULAR; INTRAVENOUS EVERY 6 HOURS PRN
Status: DISCONTINUED | OUTPATIENT
Start: 2022-05-27 | End: 2022-05-28 | Stop reason: HOSPADM

## 2022-05-27 RX ORDER — ACETAMINOPHEN 325 MG/1
650 TABLET ORAL EVERY 4 HOURS PRN
Status: DISCONTINUED | OUTPATIENT
Start: 2022-05-27 | End: 2022-05-28 | Stop reason: HOSPADM

## 2022-05-27 RX ORDER — HYDROCODONE BITARTRATE AND ACETAMINOPHEN 5; 325 MG/1; MG/1
1 TABLET ORAL EVERY 6 HOURS PRN
Status: DISCONTINUED | OUTPATIENT
Start: 2022-05-27 | End: 2022-05-28 | Stop reason: HOSPADM

## 2022-05-27 RX ORDER — ACETAMINOPHEN 160 MG/5ML
650 SOLUTION ORAL EVERY 4 HOURS PRN
Status: DISCONTINUED | OUTPATIENT
Start: 2022-05-27 | End: 2022-05-28 | Stop reason: HOSPADM

## 2022-05-27 RX ORDER — LISINOPRIL 20 MG/1
20 TABLET ORAL DAILY
Status: DISCONTINUED | OUTPATIENT
Start: 2022-05-27 | End: 2022-05-28 | Stop reason: HOSPADM

## 2022-05-27 RX ORDER — INSULIN LISPRO 100 [IU]/ML
0-9 INJECTION, SOLUTION INTRAVENOUS; SUBCUTANEOUS
Status: DISCONTINUED | OUTPATIENT
Start: 2022-05-27 | End: 2022-05-28 | Stop reason: HOSPADM

## 2022-05-27 RX ORDER — HYDROCHLOROTHIAZIDE 12.5 MG/1
12.5 TABLET ORAL DAILY
Status: DISCONTINUED | OUTPATIENT
Start: 2022-05-27 | End: 2022-05-28 | Stop reason: HOSPADM

## 2022-05-27 RX ORDER — INSULIN LISPRO 100 [IU]/ML
10 INJECTION, SOLUTION INTRAVENOUS; SUBCUTANEOUS
Status: DISCONTINUED | OUTPATIENT
Start: 2022-05-27 | End: 2022-05-27 | Stop reason: CLARIF

## 2022-05-27 RX ORDER — INSULIN LISPRO 100 [IU]/ML
10 INJECTION, SOLUTION INTRAVENOUS; SUBCUTANEOUS
Status: DISCONTINUED | OUTPATIENT
Start: 2022-05-27 | End: 2022-05-28 | Stop reason: HOSPADM

## 2022-05-27 RX ORDER — MAGNESIUM SULFATE 1 G/100ML
1 INJECTION INTRAVENOUS ONCE
Status: COMPLETED | OUTPATIENT
Start: 2022-05-27 | End: 2022-05-27

## 2022-05-27 RX ORDER — SODIUM CHLORIDE 9 MG/ML
100 INJECTION, SOLUTION INTRAVENOUS CONTINUOUS
Status: DISCONTINUED | OUTPATIENT
Start: 2022-05-27 | End: 2022-05-28 | Stop reason: HOSPADM

## 2022-05-27 RX ORDER — HYDROCODONE BITARTRATE AND ACETAMINOPHEN 5; 325 MG/1; MG/1
1 TABLET ORAL EVERY 6 HOURS PRN
Status: DISCONTINUED | OUTPATIENT
Start: 2022-05-27 | End: 2022-05-27 | Stop reason: SDUPTHER

## 2022-05-27 RX ORDER — NICOTINE POLACRILEX 4 MG
15 LOZENGE BUCCAL
Status: DISCONTINUED | OUTPATIENT
Start: 2022-05-27 | End: 2022-05-28 | Stop reason: HOSPADM

## 2022-05-27 RX ORDER — PREGABALIN 50 MG/1
50 CAPSULE ORAL 3 TIMES DAILY
Status: DISCONTINUED | OUTPATIENT
Start: 2022-05-27 | End: 2022-05-28 | Stop reason: HOSPADM

## 2022-05-27 RX ORDER — POTASSIUM CHLORIDE 750 MG/1
40 TABLET, FILM COATED, EXTENDED RELEASE ORAL AS NEEDED
Status: DISCONTINUED | OUTPATIENT
Start: 2022-05-27 | End: 2022-05-28 | Stop reason: HOSPADM

## 2022-05-27 RX ORDER — FAMOTIDINE 20 MG/1
20 TABLET, FILM COATED ORAL
Status: DISCONTINUED | OUTPATIENT
Start: 2022-05-27 | End: 2022-05-28 | Stop reason: HOSPADM

## 2022-05-27 RX ORDER — CALCIUM CARBONATE 200(500)MG
2 TABLET,CHEWABLE ORAL 2 TIMES DAILY PRN
Status: DISCONTINUED | OUTPATIENT
Start: 2022-05-27 | End: 2022-05-28 | Stop reason: HOSPADM

## 2022-05-27 RX ORDER — POTASSIUM CHLORIDE 7.45 MG/ML
10 INJECTION INTRAVENOUS
Status: DISCONTINUED | OUTPATIENT
Start: 2022-05-27 | End: 2022-05-28 | Stop reason: HOSPADM

## 2022-05-27 RX ORDER — BISOPROLOL FUMARATE 5 MG/1
2.5 TABLET, FILM COATED ORAL DAILY
Status: DISCONTINUED | OUTPATIENT
Start: 2022-05-27 | End: 2022-05-28 | Stop reason: HOSPADM

## 2022-05-27 RX ORDER — LEVOTHYROXINE SODIUM 112 UG/1
112 TABLET ORAL DAILY
Status: DISCONTINUED | OUTPATIENT
Start: 2022-05-27 | End: 2022-05-28 | Stop reason: HOSPADM

## 2022-05-27 RX ORDER — KETOROLAC TROMETHAMINE 15 MG/ML
15 INJECTION, SOLUTION INTRAMUSCULAR; INTRAVENOUS ONCE
Status: COMPLETED | OUTPATIENT
Start: 2022-05-27 | End: 2022-05-27

## 2022-05-27 RX ORDER — POTASSIUM CHLORIDE 1.5 G/1.77G
40 POWDER, FOR SOLUTION ORAL AS NEEDED
Status: DISCONTINUED | OUTPATIENT
Start: 2022-05-27 | End: 2022-05-28 | Stop reason: HOSPADM

## 2022-05-27 RX ORDER — SODIUM CHLORIDE 0.9 % (FLUSH) 0.9 %
10 SYRINGE (ML) INJECTION AS NEEDED
Status: DISCONTINUED | OUTPATIENT
Start: 2022-05-27 | End: 2022-05-28 | Stop reason: HOSPADM

## 2022-05-27 RX ORDER — ACETAMINOPHEN 650 MG/1
650 SUPPOSITORY RECTAL EVERY 4 HOURS PRN
Status: DISCONTINUED | OUTPATIENT
Start: 2022-05-27 | End: 2022-05-28 | Stop reason: HOSPADM

## 2022-05-27 RX ORDER — ACETAMINOPHEN 325 MG/1
650 TABLET ORAL EVERY 6 HOURS PRN
Status: DISCONTINUED | OUTPATIENT
Start: 2022-05-27 | End: 2022-05-28 | Stop reason: HOSPADM

## 2022-05-27 RX ORDER — SODIUM CHLORIDE 0.9 % (FLUSH) 0.9 %
10 SYRINGE (ML) INJECTION EVERY 12 HOURS SCHEDULED
Status: DISCONTINUED | OUTPATIENT
Start: 2022-05-27 | End: 2022-05-28 | Stop reason: HOSPADM

## 2022-05-27 RX ORDER — ATORVASTATIN CALCIUM 20 MG/1
80 TABLET, FILM COATED ORAL NIGHTLY
Status: DISCONTINUED | OUTPATIENT
Start: 2022-05-27 | End: 2022-05-28 | Stop reason: HOSPADM

## 2022-05-27 RX ORDER — AMLODIPINE BESYLATE 5 MG/1
5 TABLET ORAL
Status: DISCONTINUED | OUTPATIENT
Start: 2022-05-27 | End: 2022-05-28 | Stop reason: HOSPADM

## 2022-05-27 RX ORDER — ONDANSETRON 4 MG/1
4 TABLET, FILM COATED ORAL EVERY 6 HOURS PRN
Status: DISCONTINUED | OUTPATIENT
Start: 2022-05-27 | End: 2022-05-28 | Stop reason: HOSPADM

## 2022-05-27 RX ORDER — DEXTROSE MONOHYDRATE 25 G/50ML
25 INJECTION, SOLUTION INTRAVENOUS
Status: DISCONTINUED | OUTPATIENT
Start: 2022-05-27 | End: 2022-05-28 | Stop reason: HOSPADM

## 2022-05-27 RX ADMIN — HYDROCODONE BITARTRATE AND ACETAMINOPHEN 1 TABLET: 5; 325 TABLET ORAL at 20:11

## 2022-05-27 RX ADMIN — MICONAZOLE NITRATE 1 APPLICATION: 20 CREAM TOPICAL at 22:41

## 2022-05-27 RX ADMIN — INSULIN LISPRO 4 UNITS: 100 INJECTION, SOLUTION INTRAVENOUS; SUBCUTANEOUS at 08:31

## 2022-05-27 RX ADMIN — HYDROCHLOROTHIAZIDE 12.5 MG: 12.5 CAPSULE ORAL at 08:48

## 2022-05-27 RX ADMIN — INSULIN LISPRO 10 UNITS: 100 INJECTION, SOLUTION INTRAVENOUS; SUBCUTANEOUS at 12:02

## 2022-05-27 RX ADMIN — PREGABALIN 50 MG: 50 CAPSULE ORAL at 17:28

## 2022-05-27 RX ADMIN — INSULIN LISPRO 10 UNITS: 100 INJECTION, SOLUTION INTRAVENOUS; SUBCUTANEOUS at 17:29

## 2022-05-27 RX ADMIN — LEVOTHYROXINE SODIUM 112 MCG: 0.11 TABLET ORAL at 12:10

## 2022-05-27 RX ADMIN — SODIUM CHLORIDE 100 ML/HR: 9 INJECTION, SOLUTION INTRAVENOUS at 03:43

## 2022-05-27 RX ADMIN — KETOROLAC TROMETHAMINE 15 MG: 15 INJECTION, SOLUTION INTRAMUSCULAR; INTRAVENOUS at 00:35

## 2022-05-27 RX ADMIN — MAGNESIUM SULFATE 1 G: 1 INJECTION INTRAVENOUS at 17:28

## 2022-05-27 RX ADMIN — PREGABALIN 50 MG: 50 CAPSULE ORAL at 12:02

## 2022-05-27 RX ADMIN — LISINOPRIL 20 MG: 20 TABLET ORAL at 12:10

## 2022-05-27 RX ADMIN — AMLODIPINE BESYLATE 5 MG: 5 TABLET ORAL at 12:10

## 2022-05-27 RX ADMIN — Medication 10 ML: at 03:42

## 2022-05-27 RX ADMIN — ATORVASTATIN CALCIUM 80 MG: 20 TABLET, FILM COATED ORAL at 20:10

## 2022-05-27 RX ADMIN — ZINC OXIDE 1 APPLICATION: 200 OINTMENT TOPICAL at 20:12

## 2022-05-27 RX ADMIN — POTASSIUM CHLORIDE 40 MEQ: 10 TABLET, EXTENDED RELEASE ORAL at 23:34

## 2022-05-27 RX ADMIN — INSULIN LISPRO 4 UNITS: 100 INJECTION, SOLUTION INTRAVENOUS; SUBCUTANEOUS at 12:03

## 2022-05-27 RX ADMIN — BISOPROLOL FUMARATE 2.5 MG: 5 TABLET, FILM COATED ORAL at 08:48

## 2022-05-27 RX ADMIN — PREGABALIN 50 MG: 50 CAPSULE ORAL at 20:10

## 2022-05-27 RX ADMIN — HYDROCODONE BITARTRATE AND ACETAMINOPHEN 1 TABLET: 5; 325 TABLET ORAL at 03:55

## 2022-05-27 RX ADMIN — FAMOTIDINE 20 MG: 20 TABLET ORAL at 17:28

## 2022-05-27 RX ADMIN — HYDROCODONE BITARTRATE AND ACETAMINOPHEN 1 TABLET: 5; 325 TABLET ORAL at 12:10

## 2022-05-28 ENCOUNTER — READMISSION MANAGEMENT (OUTPATIENT)
Dept: CALL CENTER | Facility: HOSPITAL | Age: 82
End: 2022-05-28

## 2022-05-28 VITALS
WEIGHT: 176.37 LBS | SYSTOLIC BLOOD PRESSURE: 181 MMHG | BODY MASS INDEX: 26.73 KG/M2 | RESPIRATION RATE: 18 BRPM | HEART RATE: 58 BPM | HEIGHT: 68 IN | OXYGEN SATURATION: 97 % | DIASTOLIC BLOOD PRESSURE: 77 MMHG | TEMPERATURE: 97.5 F

## 2022-05-28 LAB
ANION GAP SERPL CALCULATED.3IONS-SCNC: 9.4 MMOL/L (ref 5–15)
BACTERIA SPEC AEROBE CULT: NORMAL
BASOPHILS # BLD AUTO: 0.07 10*3/MM3 (ref 0–0.2)
BASOPHILS NFR BLD AUTO: 1 % (ref 0–1.5)
BUN SERPL-MCNC: 29 MG/DL (ref 8–23)
BUN/CREAT SERPL: 25.7 (ref 7–25)
CALCIUM SPEC-SCNC: 9.1 MG/DL (ref 8.6–10.5)
CHLORIDE SERPL-SCNC: 107 MMOL/L (ref 98–107)
CO2 SERPL-SCNC: 22.6 MMOL/L (ref 22–29)
CREAT SERPL-MCNC: 1.13 MG/DL (ref 0.57–1)
DEPRECATED RDW RBC AUTO: 43.8 FL (ref 37–54)
EGFRCR SERPLBLD CKD-EPI 2021: 49 ML/MIN/1.73
EOSINOPHIL # BLD AUTO: 0.43 10*3/MM3 (ref 0–0.4)
EOSINOPHIL NFR BLD AUTO: 5.9 % (ref 0.3–6.2)
ERYTHROCYTE [DISTWIDTH] IN BLOOD BY AUTOMATED COUNT: 14.3 % (ref 12.3–15.4)
GLUCOSE BLDC GLUCOMTR-MCNC: 144 MG/DL (ref 70–130)
GLUCOSE BLDC GLUCOMTR-MCNC: 158 MG/DL (ref 70–130)
GLUCOSE SERPL-MCNC: 167 MG/DL (ref 65–99)
HCT VFR BLD AUTO: 33.4 % (ref 34–46.6)
HGB BLD-MCNC: 11.3 G/DL (ref 12–15.9)
IMM GRANULOCYTES # BLD AUTO: 0.02 10*3/MM3 (ref 0–0.05)
IMM GRANULOCYTES NFR BLD AUTO: 0.3 % (ref 0–0.5)
LYMPHOCYTES # BLD AUTO: 1.76 10*3/MM3 (ref 0.7–3.1)
LYMPHOCYTES NFR BLD AUTO: 24 % (ref 19.6–45.3)
MCH RBC QN AUTO: 28.9 PG (ref 26.6–33)
MCHC RBC AUTO-ENTMCNC: 33.8 G/DL (ref 31.5–35.7)
MCV RBC AUTO: 85.4 FL (ref 79–97)
MONOCYTES # BLD AUTO: 0.6 10*3/MM3 (ref 0.1–0.9)
MONOCYTES NFR BLD AUTO: 8.2 % (ref 5–12)
NEUTROPHILS NFR BLD AUTO: 4.46 10*3/MM3 (ref 1.7–7)
NEUTROPHILS NFR BLD AUTO: 60.6 % (ref 42.7–76)
NRBC BLD AUTO-RTO: 0 /100 WBC (ref 0–0.2)
PLATELET # BLD AUTO: 257 10*3/MM3 (ref 140–450)
PMV BLD AUTO: 9.4 FL (ref 6–12)
POTASSIUM SERPL-SCNC: 4.4 MMOL/L (ref 3.5–5.2)
RBC # BLD AUTO: 3.91 10*6/MM3 (ref 3.77–5.28)
SODIUM SERPL-SCNC: 139 MMOL/L (ref 136–145)
WBC NRBC COR # BLD: 7.34 10*3/MM3 (ref 3.4–10.8)

## 2022-05-28 PROCEDURE — 63710000001 INSULIN LISPRO (HUMAN) PER 5 UNITS: Performed by: INTERNAL MEDICINE

## 2022-05-28 PROCEDURE — 63710000001 INSULIN LISPRO (HUMAN) PER 5 UNITS: Performed by: NURSE PRACTITIONER

## 2022-05-28 PROCEDURE — 82962 GLUCOSE BLOOD TEST: CPT

## 2022-05-28 PROCEDURE — G0378 HOSPITAL OBSERVATION PER HR: HCPCS

## 2022-05-28 PROCEDURE — 85025 COMPLETE CBC W/AUTO DIFF WBC: CPT | Performed by: INTERNAL MEDICINE

## 2022-05-28 PROCEDURE — 80048 BASIC METABOLIC PNL TOTAL CA: CPT | Performed by: INTERNAL MEDICINE

## 2022-05-28 RX ORDER — DULOXETIN HYDROCHLORIDE 30 MG/1
30 CAPSULE, DELAYED RELEASE ORAL DAILY
Qty: 30 CAPSULE | Refills: 0 | Status: SHIPPED | OUTPATIENT
Start: 2022-05-28 | End: 2022-06-02 | Stop reason: SDUPTHER

## 2022-05-28 RX ORDER — DULOXETIN HYDROCHLORIDE 30 MG/1
30 CAPSULE, DELAYED RELEASE ORAL DAILY
Qty: 30 CAPSULE | Refills: 1 | OUTPATIENT
Start: 2022-05-28

## 2022-05-28 RX ORDER — DULOXETIN HYDROCHLORIDE 30 MG/1
30 CAPSULE, DELAYED RELEASE ORAL DAILY
Status: DISCONTINUED | OUTPATIENT
Start: 2022-05-28 | End: 2022-05-28 | Stop reason: HOSPADM

## 2022-05-28 RX ADMIN — INSULIN LISPRO 2 UNITS: 100 INJECTION, SOLUTION INTRAVENOUS; SUBCUTANEOUS at 08:17

## 2022-05-28 RX ADMIN — ZINC OXIDE 1 APPLICATION: 200 OINTMENT TOPICAL at 08:23

## 2022-05-28 RX ADMIN — POTASSIUM CHLORIDE 40 MEQ: 10 TABLET, EXTENDED RELEASE ORAL at 04:29

## 2022-05-28 RX ADMIN — BISOPROLOL FUMARATE 2.5 MG: 5 TABLET, FILM COATED ORAL at 08:16

## 2022-05-28 RX ADMIN — INSULIN LISPRO 10 UNITS: 100 INJECTION, SOLUTION INTRAVENOUS; SUBCUTANEOUS at 12:17

## 2022-05-28 RX ADMIN — HYDROCHLOROTHIAZIDE 12.5 MG: 12.5 CAPSULE ORAL at 08:16

## 2022-05-28 RX ADMIN — Medication 10 ML: at 08:17

## 2022-05-28 RX ADMIN — POTASSIUM CHLORIDE 40 MEQ: 10 TABLET, EXTENDED RELEASE ORAL at 08:25

## 2022-05-28 RX ADMIN — MICONAZOLE NITRATE 1 APPLICATION: 20 CREAM TOPICAL at 08:25

## 2022-05-28 RX ADMIN — AMLODIPINE BESYLATE 5 MG: 5 TABLET ORAL at 08:16

## 2022-05-28 RX ADMIN — LEVOTHYROXINE SODIUM 112 MCG: 0.11 TABLET ORAL at 08:17

## 2022-05-28 RX ADMIN — INSULIN LISPRO 10 UNITS: 100 INJECTION, SOLUTION INTRAVENOUS; SUBCUTANEOUS at 08:17

## 2022-05-28 RX ADMIN — LISINOPRIL 20 MG: 20 TABLET ORAL at 08:16

## 2022-05-28 RX ADMIN — PREGABALIN 50 MG: 50 CAPSULE ORAL at 08:22

## 2022-05-28 NOTE — OUTREACH NOTE
Prep Survey    Flowsheet Row Responses   [facility] facility patient discharged from? [place]   Is LACE score < 7 ? No   Emergency Room discharge w/ pulse ox? No   Eligibility [facility]   Date of Admission 05/26/22   Date of Discharge 05/28/22   Discharge Disposition Home or Self Care   Discharge diagnosis  burning sensation throughout her body   Does the patient have one of the following disease processes/diagnoses(primary or secondary)? Other   Does the patient have Home health ordered? No   Is there a DME ordered? No   Prep survey completed? Yes          JANI FOUNTAIN - Registered Nurse

## 2022-05-31 ENCOUNTER — TRANSITIONAL CARE MANAGEMENT TELEPHONE ENCOUNTER (OUTPATIENT)
Dept: CALL CENTER | Facility: HOSPITAL | Age: 82
End: 2022-05-31

## 2022-05-31 LAB
BACTERIA SPEC AEROBE CULT: NORMAL
BACTERIA SPEC AEROBE CULT: NORMAL

## 2022-05-31 NOTE — OUTREACH NOTE
Call Center TCM Note    Flowsheet Row Responses   [facility] patient discharged from? [place]   Does the patient have one of the following disease processes/diagnoses(primary or secondary)? Other   TCM attempt successful? Yes   Discharge diagnosis  burning sensation throughout her body   Meds reviewed with patient/caregiver? Yes   Is the patient having any side effects they believe may be caused by any medication additions or changes? No   Does the patient have all medications ordered at discharge? Yes   Prescription comments Pt to resume LYRICA as previously directed prior to admit. NEW MEDICATIONS AT D/C: CYMBALTA, HYDROCOTISONE-ZINC OXIDE-BACITRACIN-NYSTATIN CREAM. PT TO STOP HCTZ 12.5MG   Is the patient taking all medications as directed (includes completed medication regime)? Yes   Does the patient have a primary care provider?  Yes   Does the patient have an appointment with their PCP within 7 days of discharge? Yes   Comments regarding PCP TCM APPT with PCP Dr Mead is 06/02/2022.   Has the patient kept scheduled appointments due by today? N/A   Has home health visited the patient within 72 hours of discharge? N/A   Psychosocial issues? No   Did the patient receive a copy of their discharge instructions? Yes   Nursing interventions Reviewed instructions with patient   What is the patient's perception of their health status since discharge? Improving   Is the patient/caregiver able to teach back signs and symptoms related to disease process for when to call PCP? Yes   Is the patient/caregiver able to teach back signs and symptoms related to disease process for when to call 911? Yes   Is the patient/caregiver able to teach back the hierarchy of who to call/visit for symptoms/problems? PCP, Specialist, Home health nurse, Urgent Care, ED, 911 Yes   TCM call completed? Yes   Wrap up additional comments Pt better, burning sensation throughout body resolved. Pt has resumed LYRICA as previously directed  prior to admit. NEW MEDICATIONS AT D/C: CYMBALTA, HYDROCOTISONE-ZINC OXIDE-BACITRACIN-NYSTATIN CREAM. PT TO STOP HCTZ 12.5MG. TCM APPT with PCP Dr Mead has been sched for 06/02/2022.          Kelsey Victor MA    5/31/2022, 13:10 EDT

## 2022-06-02 ENCOUNTER — TELEPHONE (OUTPATIENT)
Dept: ENDOCRINOLOGY | Age: 82
End: 2022-06-02

## 2022-06-02 ENCOUNTER — OFFICE VISIT (OUTPATIENT)
Dept: FAMILY MEDICINE CLINIC | Facility: CLINIC | Age: 82
End: 2022-06-02

## 2022-06-02 VITALS
WEIGHT: 186.6 LBS | RESPIRATION RATE: 16 BRPM | SYSTOLIC BLOOD PRESSURE: 187 MMHG | BODY MASS INDEX: 28.28 KG/M2 | DIASTOLIC BLOOD PRESSURE: 76 MMHG | TEMPERATURE: 97.4 F | HEIGHT: 68 IN | HEART RATE: 68 BPM

## 2022-06-02 DIAGNOSIS — R52 BURNING PAIN: Primary | ICD-10-CM

## 2022-06-02 DIAGNOSIS — G93.41 METABOLIC ENCEPHALOPATHY: ICD-10-CM

## 2022-06-02 DIAGNOSIS — Z09 HOSPITAL DISCHARGE FOLLOW-UP: ICD-10-CM

## 2022-06-02 DIAGNOSIS — F41.9 ANXIETY: ICD-10-CM

## 2022-06-02 DIAGNOSIS — M79.2 NEUROPATHIC PAIN: ICD-10-CM

## 2022-06-02 PROCEDURE — 99495 TRANSJ CARE MGMT MOD F2F 14D: CPT | Performed by: FAMILY MEDICINE

## 2022-06-02 RX ORDER — PREGABALIN 50 MG/1
50 CAPSULE ORAL 3 TIMES DAILY
Qty: 270 CAPSULE | Refills: 1 | Status: ON HOLD | OUTPATIENT
Start: 2022-06-02 | End: 2022-06-24 | Stop reason: SDUPTHER

## 2022-06-02 RX ORDER — DULOXETIN HYDROCHLORIDE 30 MG/1
30 CAPSULE, DELAYED RELEASE ORAL DAILY
Qty: 14 CAPSULE | Refills: 1 | Status: SHIPPED | OUTPATIENT
Start: 2022-06-02 | End: 2022-06-02 | Stop reason: SDUPTHER

## 2022-06-02 RX ORDER — PREGABALIN 50 MG/1
50 CAPSULE ORAL 3 TIMES DAILY
Qty: 42 CAPSULE | Refills: 1 | Status: SHIPPED | OUTPATIENT
Start: 2022-06-02 | End: 2022-06-02 | Stop reason: SDUPTHER

## 2022-06-02 RX ORDER — HYDROCHLOROTHIAZIDE 12.5 MG/1
12.5 CAPSULE, GELATIN COATED ORAL DAILY
COMMUNITY
End: 2022-09-06 | Stop reason: HOSPADM

## 2022-06-02 RX ORDER — DULOXETIN HYDROCHLORIDE 30 MG/1
30 CAPSULE, DELAYED RELEASE ORAL DAILY
Qty: 90 CAPSULE | Refills: 1 | Status: SHIPPED | OUTPATIENT
Start: 2022-06-02 | End: 2022-11-23

## 2022-06-02 NOTE — TELEPHONE ENCOUNTER
PATIENT CALLED NEEDS A REFILL ON   pregabalin (LYRICA) 50 MG capsule,     SEND TO:  [id number] - [place] [address] AT [address] - [phone]  - [phone] Phone:  [phone]   Fax:  [phone]

## 2022-06-02 NOTE — PROGRESS NOTES
Transitional Care Follow Up Visit  Subjective     Halie Guidry is a 81 y.o. female who presents for a transitional care management visit.    Within 48 business hours after discharge our office contacted her via telephone to coordinate her care and needs.      I reviewed and discussed the details of that call along with the discharge summary, hospital problems, inpatient lab results, inpatient diagnostic studies, and consultation reports with Halie.     Current outpatient and discharge medications have been reconciled for the patient.  Reviewed by: Napoleon Mead MD      Date of TCM Phone Call 5/28/2022   [facility]   Date of Admission 5/26/2022   Date of Discharge 5/28/2022   Discharge Disposition Home or Self Care     Risk for Readmission (LACE) Score: 11 (5/28/2022  6:01 AM)      History of Present Illness   Course During Hospital Stay:      Date of Admission: 5/26/2022  Date of Discharge:  5/28/2022  Primary Care Physician: Napoleon Mead MD        Chief Complaint:   burning pain        Discharge Diagnoses            Active Hospital Problems     Diagnosis   POA   • **Burning pain [R52]   Yes   • Anxiety disorder [F41.9]   Yes   • Neuropathic pain [M79.2]   Yes   • Intertrigo [L30.4]   Yes   • History of Clostridium difficile infection [Z86.19]   Yes   • Noncompliance with medication regimen [Z91.14]   Not Applicable   • Type 2 diabetes mellitus, with long-term current use of insulin (HCC) [E11.9, Z79.4]   Not Applicable   • Primary hypothyroidism [E03.9]   Yes   • Benign essential hypertension [I10]   Yes   • Stage 3b chronic kidney disease (HCC) [N18.32]   Yes       Resolved Hospital Problems   No resolved problems to display.         Hospital Course    patient is 81-year-old female with known history of diabetes mellitus, hypertension, hypothyroidism, hyperlipidemia, neuropathy, CKD presented to hospital with complaints of burning sensation throughout her body. Patient denied any  "numbness, tingling, one-sided weakness, speech deficits, headache, vision changes.  Reportedly she was on Lyrica which she has decided to stop taking for no reason.  In the ER routine workup was done and WBC was normal, UA with no evidence of any UTI.  CT of the brain not performed given that she was nonfocal.  Post hospitalization she was initiated on Lyrica with which her symptoms have completely resolved.  Given the above she is being discharged home.  Of note creatinine was slightly on the rise at 1.13 therefore HCTZ has been discontinued.  Psychiatry also did evaluate and patient was initiated on Cymbalta on a which has been prescribed.  Now that she is feeling significantly better and is hemodynamically stable patient is being discharged home in a stable condition.    Current outpatient and discharge medications have been reconciled for the patient.  Reviewed by: Napoleon Mead MD       The following portions of the patient's history were reviewed and updated as appropriate: allergies, current medications, past family history, past medical history, past social history, past surgical history and problem list.    Review of Systems   Constitutional: Negative for activity change, chills and fever.   Respiratory: Negative for cough.    Cardiovascular: Negative for chest pain.   Psychiatric/Behavioral: Negative for dysphoric mood.       BP (!) 187/76   Pulse 68   Temp 97.4 °F (36.3 °C) (Oral)   Resp 16   Ht 172.7 cm (67.99\")   Wt 84.6 kg (186 lb 9.6 oz)   BMI 28.38 kg/m²     Objective   Physical Exam  Constitutional:       General: She is not in acute distress.     Appearance: She is well-developed.   Cardiovascular:      Rate and Rhythm: Normal rate and regular rhythm.   Pulmonary:      Effort: Pulmonary effort is normal.      Breath sounds: Normal breath sounds.   Neurological:      Mental Status: She is alert and oriented to person, place, and time.   Psychiatric:         Behavior: Behavior normal.         " "Thought Content: Thought content normal.     Hospital records reviewed with pt confirming HPI.      Assessment & Plan   Diagnoses and all orders for this visit:    1. Burning pain (Primary)    2. Neuropathic pain    3. Anxiety    4. Hospital discharge follow-up    Pt walked all the back to see me today and was \"struggling\" when made it back, thus the BPs.           "

## 2022-06-03 DIAGNOSIS — G93.41 METABOLIC ENCEPHALOPATHY: ICD-10-CM

## 2022-06-05 RX ORDER — PREGABALIN 50 MG/1
CAPSULE ORAL
Qty: 270 CAPSULE | OUTPATIENT
Start: 2022-06-05

## 2022-06-09 ENCOUNTER — OFFICE VISIT (OUTPATIENT)
Dept: ENDOCRINOLOGY | Age: 82
End: 2022-06-09

## 2022-06-09 VITALS
DIASTOLIC BLOOD PRESSURE: 70 MMHG | OXYGEN SATURATION: 99 % | HEIGHT: 63 IN | HEART RATE: 62 BPM | SYSTOLIC BLOOD PRESSURE: 132 MMHG | BODY MASS INDEX: 33.31 KG/M2 | WEIGHT: 188 LBS

## 2022-06-09 DIAGNOSIS — E78.5 HYPERLIPIDEMIA ASSOCIATED WITH TYPE 2 DIABETES MELLITUS: ICD-10-CM

## 2022-06-09 DIAGNOSIS — E13.43: ICD-10-CM

## 2022-06-09 DIAGNOSIS — Z79.4 TYPE 2 DIABETES MELLITUS WITH HYPERGLYCEMIA, WITH LONG-TERM CURRENT USE OF INSULIN: ICD-10-CM

## 2022-06-09 DIAGNOSIS — E11.65 TYPE 2 DIABETES MELLITUS WITH HYPERGLYCEMIA, WITH LONG-TERM CURRENT USE OF INSULIN: ICD-10-CM

## 2022-06-09 DIAGNOSIS — E11.69 HYPERLIPIDEMIA ASSOCIATED WITH TYPE 2 DIABETES MELLITUS: ICD-10-CM

## 2022-06-09 DIAGNOSIS — Z79.4 LONG-TERM INSULIN USE: Primary | ICD-10-CM

## 2022-06-09 DIAGNOSIS — E03.9 ACQUIRED HYPOTHYROIDISM: ICD-10-CM

## 2022-06-09 PROCEDURE — 99214 OFFICE O/P EST MOD 30 MIN: CPT | Performed by: INTERNAL MEDICINE

## 2022-06-09 NOTE — PROGRESS NOTES
"Chief Complaint  Chief Complaint   Patient presents with   • Diabetes     Type 2       Subjective          History of Present Illness    Halie Guidry 81 y.o. presents with Type 2 dm as a F/u patient.     Patient was recently hospitalized in May 2022 after she discontinued taking the Lyrica.  She was hospitalized because of high Bg > 600 mg/dl as she was having her son's coke and not drinking diet coke.   Also admitted for gall bladder infection.     Type 2 dm - Diagnosed about 10 + years ago.   Today in clinic pt reports being on Trulicity - 1.5 mg subq once a week, Humalog 9 plus ssi ( 2 - for 50 over 150) tid ac if bg > 150, Toujeo 60 units at bed time  Avg bg - 120 - 160  Checks BG -2 times a day  Sensor -no  Dm retinopathy -no,Last eye exam -due for exam  Dm nephropathy -yes, CKD stage II  Dm neuropathy -yes,Dm neuropathy meds - on lyrica  CAD -no  CVA -no  Episodes of hypoglycemia -   Pt is physically active. weight has been stable.   Pt tries to follow DM diet for most part.       Hyperlipidemia  On Lipitor 80 mg oral daily.    Hypothyroidism  On levothyroxine 112 mcg oral daily      Reviewed primary care physician's/consulting physician documentation and lab results         I have reviewed the patient's allergies, medicines, past medical hx, family hx and social hx in detail.    Objective   Vital Signs:   /70   Pulse 62   Ht 160 cm (63\")   Wt 85.3 kg (188 lb)   SpO2 99%   BMI 33.30 kg/m²   Physical Exam   General appearance - no distress  Eyes- anicteric sclera  Ear nose and throat-external ears and nose normal.    Respiratory-normal chest on inspection.  No respiratory distress noted.  Skin-no rashes.  Neuro-alert and oriented x3              Result Review :   The following data was reviewed by: Solomon Barrow MD on 06/09/2022:  Admission on 05/26/2022, Discharged on 05/28/2022   Component Date Value Ref Range Status   • Glucose 05/26/2022 98  65 - 99 mg/dL Final   • BUN 05/26/2022 27 (A) " 8 - 23 mg/dL Final   • Creatinine 05/26/2022 1.03 (A) 0.57 - 1.00 mg/dL Final   • Sodium 05/26/2022 142  136 - 145 mmol/L Final   • Potassium 05/26/2022 3.3 (A) 3.5 - 5.2 mmol/L Final   • Chloride 05/26/2022 104  98 - 107 mmol/L Final   • CO2 05/26/2022 23.8  22.0 - 29.0 mmol/L Final   • Calcium 05/26/2022 9.8  8.6 - 10.5 mg/dL Final   • Total Protein 05/26/2022 7.6  6.0 - 8.5 g/dL Final   • Albumin 05/26/2022 3.90  3.50 - 5.20 g/dL Final   • ALT (SGPT) 05/26/2022 9  1 - 33 U/L Final   • AST (SGOT) 05/26/2022 13  1 - 32 U/L Final   • Alkaline Phosphatase 05/26/2022 94  39 - 117 U/L Final   • Total Bilirubin 05/26/2022 0.4  0.0 - 1.2 mg/dL Final   • Globulin 05/26/2022 3.7  gm/dL Final   • A/G Ratio 05/26/2022 1.1  g/dL Final   • BUN/Creatinine Ratio 05/26/2022 26.2 (A) 7.0 - 25.0 Final   • Anion Gap 05/26/2022 14.2  5.0 - 15.0 mmol/L Final   • eGFR 05/26/2022 54.7 (A) >60.0 mL/min/1.73 Final    National Kidney Foundation and American Society of Nephrology (ASN) Task Force recommended calculation based on the Chronic Kidney Disease Epidemiology Collaboration (CKD-EPI) equation refit without adjustment for race.   • Lipase 05/26/2022 50  13 - 60 U/L Final   • Troponin T 05/26/2022 <0.010  0.000 - 0.030 ng/mL Final   • Blood Culture 05/26/2022 No growth at 5 days   Final   • Blood Culture 05/26/2022 No growth at 5 days   Final   • Lactate 05/26/2022 1.0  0.5 - 2.0 mmol/L Final   • Procalcitonin 05/26/2022 0.10  0.00 - 0.25 ng/mL Final   • Strep A Ag 05/26/2022 Negative  Negative Final   • ADENOVIRUS, PCR 05/26/2022 Not Detected  Not Detected Final   • Coronavirus 229E 05/26/2022 Not Detected  Not Detected Final   • Coronavirus HKU1 05/26/2022 Not Detected  Not Detected Final   • Coronavirus NL63 05/26/2022 Not Detected  Not Detected Final   • Coronavirus OC43 05/26/2022 Not Detected  Not Detected Final   • COVID19 05/26/2022 Not Detected  Not Detected - Ref. Range Final   • Human Metapneumovirus 05/26/2022 Not Detected   Not Detected Final   • Human Rhinovirus/Enterovirus 05/26/2022 Not Detected  Not Detected Final   • Influenza A PCR 05/26/2022 Not Detected  Not Detected Final   • Influenza B PCR 05/26/2022 Not Detected  Not Detected Final   • Parainfluenza Virus 1 05/26/2022 Not Detected  Not Detected Final   • Parainfluenza Virus 2 05/26/2022 Not Detected  Not Detected Final   • Parainfluenza Virus 3 05/26/2022 Not Detected  Not Detected Final   • Parainfluenza Virus 4 05/26/2022 Not Detected  Not Detected Final   • RSV, PCR 05/26/2022 Not Detected  Not Detected Final   • Bordetella pertussis pcr 05/26/2022 Not Detected  Not Detected Final   • Bordetella parapertussis PCR 05/26/2022 Not Detected  Not Detected Final   • Chlamydophila pneumoniae PCR 05/26/2022 Not Detected  Not Detected Final   • Mycoplasma pneumo by PCR 05/26/2022 Not Detected  Not Detected Final   • QT Interval 05/26/2022 458  ms Final   • WBC 05/26/2022 9.38  3.40 - 10.80 10*3/mm3 Final   • RBC 05/26/2022 4.27  3.77 - 5.28 10*6/mm3 Final   • Hemoglobin 05/26/2022 12.4  12.0 - 15.9 g/dL Final   • Hematocrit 05/26/2022 36.7  34.0 - 46.6 % Final   • MCV 05/26/2022 85.9  79.0 - 97.0 fL Final   • MCH 05/26/2022 29.0  26.6 - 33.0 pg Final   • MCHC 05/26/2022 33.8  31.5 - 35.7 g/dL Final   • RDW 05/26/2022 14.4  12.3 - 15.4 % Final   • RDW-SD 05/26/2022 45.1  37.0 - 54.0 fl Final   • MPV 05/26/2022 9.3  6.0 - 12.0 fL Final   • Platelets 05/26/2022 284  140 - 450 10*3/mm3 Final   • Neutrophil % 05/26/2022 71.5  42.7 - 76.0 % Final   • Lymphocyte % 05/26/2022 17.1 (A) 19.6 - 45.3 % Final   • Monocyte % 05/26/2022 7.8  5.0 - 12.0 % Final   • Eosinophil % 05/26/2022 2.5  0.3 - 6.2 % Final   • Basophil % 05/26/2022 0.7  0.0 - 1.5 % Final   • Immature Grans % 05/26/2022 0.4  0.0 - 0.5 % Final   • Neutrophils, Absolute 05/26/2022 6.71  1.70 - 7.00 10*3/mm3 Final   • Lymphocytes, Absolute 05/26/2022 1.60  0.70 - 3.10 10*3/mm3 Final   • Monocytes, Absolute 05/26/2022 0.73   0.10 - 0.90 10*3/mm3 Final   • Eosinophils, Absolute 05/26/2022 0.23  0.00 - 0.40 10*3/mm3 Final   • Basophils, Absolute 05/26/2022 0.07  0.00 - 0.20 10*3/mm3 Final   • Immature Grans, Absolute 05/26/2022 0.04  0.00 - 0.05 10*3/mm3 Final   • nRBC 05/26/2022 0.0  0.0 - 0.2 /100 WBC Final   • Glucose 05/26/2022 112  70 - 130 mg/dL Final    Meter: [id number] : [id number] Scott Pink MARY   • Color, UA 05/26/2022 Yellow  Yellow, Straw Final   • Appearance, UA 05/26/2022 Clear  Clear Final   • pH, UA 05/26/2022 6.0  5.0 - 8.0 Final   • Specific Gravity, UA 05/26/2022 1.022  1.005 - 1.030 Final   • Glucose, UA 05/26/2022 Negative  Negative Final   • Ketones, UA 05/26/2022 Negative  Negative Final   • Bilirubin, UA 05/26/2022 Negative  Negative Final   • Blood, UA 05/26/2022 Negative  Negative Final   • Protein, UA 05/26/2022 >=300 mg/dL (3+) (A) Negative Final   • Leuk Esterase, UA 05/26/2022 Negative  Negative Final   • Nitrite, UA 05/26/2022 Negative  Negative Final   • Urobilinogen, UA 05/26/2022 0.2 E.U./dL  0.2 - 1.0 E.U./dL Final   • RBC, UA 05/26/2022 0-2  None Seen, 0-2 /HPF Final   • WBC, UA 05/26/2022 0-2  None Seen, 0-2 /HPF Final   • Bacteria, UA 05/26/2022 None Seen  None Seen /HPF Final   • Squamous Epithelial Cells, UA 05/26/2022 0-2  None Seen, 0-2 /HPF Final   • Hyaline Casts, UA 05/26/2022 None Seen  None Seen /LPF Final   • Methodology 05/26/2022 Automated Microscopy   Final   • Throat Culture, Beta Strep 05/26/2022 No Beta Hemolytic Streptococcus Isolated   Final   • Glucose 05/27/2022 156 (A) 65 - 99 mg/dL Final   • BUN 05/27/2022 24 (A) 8 - 23 mg/dL Final   • Creatinine 05/27/2022 1.08 (A) 0.57 - 1.00 mg/dL Final   • Sodium 05/27/2022 139  136 - 145 mmol/L Final   • Potassium 05/27/2022 3.0 (A) 3.5 - 5.2 mmol/L Final   • Chloride 05/27/2022 102  98 - 107 mmol/L Final   • CO2 05/27/2022 24.0  22.0 - 29.0 mmol/L Final   • Calcium 05/27/2022 8.9  8.6 - 10.5 mg/dL Final   • BUN/Creatinine Ratio  05/27/2022 22.2  7.0 - 25.0 Final   • Anion Gap 05/27/2022 13.0  5.0 - 15.0 mmol/L Final   • eGFR 05/27/2022 51.7 (A) >60.0 mL/min/1.73 Final    National Kidney Foundation and American Society of Nephrology (ASN) Task Force recommended calculation based on the Chronic Kidney Disease Epidemiology Collaboration (CKD-EPI) equation refit without adjustment for race.   • WBC 05/27/2022 7.95  3.40 - 10.80 10*3/mm3 Final   • RBC 05/27/2022 4.11  3.77 - 5.28 10*6/mm3 Final   • Hemoglobin 05/27/2022 11.8 (A) 12.0 - 15.9 g/dL Final   • Hematocrit 05/27/2022 35.9  34.0 - 46.6 % Final   • MCV 05/27/2022 87.3  79.0 - 97.0 fL Final   • MCH 05/27/2022 28.7  26.6 - 33.0 pg Final   • MCHC 05/27/2022 32.9  31.5 - 35.7 g/dL Final   • RDW 05/27/2022 14.1  12.3 - 15.4 % Final   • RDW-SD 05/27/2022 45.3  37.0 - 54.0 fl Final   • MPV 05/27/2022 9.4  6.0 - 12.0 fL Final   • Platelets 05/27/2022 269  140 - 450 10*3/mm3 Final   • Glucose 05/27/2022 201 (A) 70 - 130 mg/dL Final    Meter: [id number] : [id number] McQuesusi Cashn NA   • Magnesium 05/27/2022 1.8  1.6 - 2.4 mg/dL Final   • Sed Rate 05/27/2022 14  0 - 30 mm/hr Final   • C-Reactive Protein 05/27/2022 <0.30  0.00 - 0.50 mg/dL Final   • Hemoglobin A1C 05/27/2022 7.40 (A) 4.80 - 5.60 % Final   • TSH 05/27/2022 1.150  0.270 - 4.200 uIU/mL Final   • Glucose 05/27/2022 247 (A) 70 - 130 mg/dL Final    Meter: [id number] : [id number] Shanae Kitchen PCA   • Glucose 05/27/2022 132 (A) 70 - 130 mg/dL Final    Meter: [id number] : [id number] Shanae Kitchen PCA   • Glucose 05/27/2022 171 (A) 70 - 130 mg/dL Final    Meter: [id number] : [id number] Blake Santana NA   • Glucose 05/28/2022 167 (A) 65 - 99 mg/dL Final   • BUN 05/28/2022 29 (A) 8 - 23 mg/dL Final   • Creatinine 05/28/2022 1.13 (A) 0.57 - 1.00 mg/dL Final   • Sodium 05/28/2022 139  136 - 145 mmol/L Final   • Potassium 05/28/2022 4.4  3.5 - 5.2 mmol/L Final   • Chloride 05/28/2022 107  98 - 107 mmol/L Final   • CO2  05/28/2022 22.6  22.0 - 29.0 mmol/L Final   • Calcium 05/28/2022 9.1  8.6 - 10.5 mg/dL Final   • BUN/Creatinine Ratio 05/28/2022 25.7 (A) 7.0 - 25.0 Final   • Anion Gap 05/28/2022 9.4  5.0 - 15.0 mmol/L Final   • eGFR 05/28/2022 49.0 (A) >60.0 mL/min/1.73 Final    National Kidney Foundation and American Society of Nephrology (ASN) Task Force recommended calculation based on the Chronic Kidney Disease Epidemiology Collaboration (CKD-EPI) equation refit without adjustment for race.   • WBC 05/28/2022 7.34  3.40 - 10.80 10*3/mm3 Final   • RBC 05/28/2022 3.91  3.77 - 5.28 10*6/mm3 Final   • Hemoglobin 05/28/2022 11.3 (A) 12.0 - 15.9 g/dL Final   • Hematocrit 05/28/2022 33.4 (A) 34.0 - 46.6 % Final   • MCV 05/28/2022 85.4  79.0 - 97.0 fL Final   • MCH 05/28/2022 28.9  26.6 - 33.0 pg Final   • MCHC 05/28/2022 33.8  31.5 - 35.7 g/dL Final   • RDW 05/28/2022 14.3  12.3 - 15.4 % Final   • RDW-SD 05/28/2022 43.8  37.0 - 54.0 fl Final   • MPV 05/28/2022 9.4  6.0 - 12.0 fL Final   • Platelets 05/28/2022 257  140 - 450 10*3/mm3 Final   • Neutrophil % 05/28/2022 60.6  42.7 - 76.0 % Final   • Lymphocyte % 05/28/2022 24.0  19.6 - 45.3 % Final   • Monocyte % 05/28/2022 8.2  5.0 - 12.0 % Final   • Eosinophil % 05/28/2022 5.9  0.3 - 6.2 % Final   • Basophil % 05/28/2022 1.0  0.0 - 1.5 % Final   • Immature Grans % 05/28/2022 0.3  0.0 - 0.5 % Final   • Neutrophils, Absolute 05/28/2022 4.46  1.70 - 7.00 10*3/mm3 Final   • Lymphocytes, Absolute 05/28/2022 1.76  0.70 - 3.10 10*3/mm3 Final   • Monocytes, Absolute 05/28/2022 0.60  0.10 - 0.90 10*3/mm3 Final   • Eosinophils, Absolute 05/28/2022 0.43 (A) 0.00 - 0.40 10*3/mm3 Final   • Basophils, Absolute 05/28/2022 0.07  0.00 - 0.20 10*3/mm3 Final   • Immature Grans, Absolute 05/28/2022 0.02  0.00 - 0.05 10*3/mm3 Final   • nRBC 05/28/2022 0.0  0.0 - 0.2 /100 WBC Final   • Glucose 05/28/2022 158 (A) 70 - 130 mg/dL Final    Meter: [id number] : [id number] Blake HERNANDEZ   • Glucose  05/28/2022 144 (A) 70 - 130 mg/dL Final    Meter: [id number] : [id number] Shanae Kitchen [facility]     Data reviewed: PCP notes       Results Review:    I reviewed the patient's new clinical results.     Assessment and Plan    Problem List Items Addressed This Visit        Other    Type 2 diabetes mellitus, with long-term current use of insulin (HCC)    Relevant Orders    Basic Metabolic Panel    Hemoglobin A1c    Lipid Panel    T4, Free    TSH    Vitamin B12 & Folate    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 (HCC)    Relevant Orders    Basic Metabolic Panel    Hemoglobin A1c    Lipid Panel    T4, Free    TSH    Vitamin B12 & Folate      Other Visit Diagnoses     Long-term insulin use (HCC)    -  Primary    Relevant Orders    Basic Metabolic Panel    Hemoglobin A1c    Lipid Panel    T4, Free    TSH    Vitamin B12 & Folate    Hyperlipidemia associated with type 2 diabetes mellitus (HCC)        Relevant Orders    Basic Metabolic Panel    Hemoglobin A1c    Lipid Panel    T4, Free    TSH    Vitamin B12 & Folate    Acquired hypothyroidism        Relevant Orders    Basic Metabolic Panel    Hemoglobin A1c    Lipid Panel    T4, Free    TSH    Vitamin B12 & Folate        Type 2 diabetes mellitus-uncontrolled with hyperglycemia  HbA1c is decently controlled for patient's age.  Continue the above insulin regimen  Emphasized the patient to not to have the sugary stuff that could be raising her blood sugars so drastically.    Hyperlipidemia  Continue Lipitor 80 mg oral daily.    Hypothyroidism  Continue levothyroxine 112 mcg oral daily.    Interpreted the blood work-up/imaging results performed by the primary care/consulting physician -    Refills sent to pharmacy    Follow Up     Patient was given instructions and counseling regarding her condition or for health maintenance advice. Please see specific information pulled into the AVS if appropriate.       Thank you for asking me to see your patient, Halie LIMA  "[place] in consultation.         Solomon Barrow MD  06/09/22      EMR Dragon / transcription disclaimer:     \"Dictated utilizing Dragon dictation\".         "

## 2022-06-16 RX ORDER — INSULIN GLARGINE 300 U/ML
INJECTION, SOLUTION SUBCUTANEOUS
Qty: 27 ML | Refills: 3 | Status: ON HOLD | OUTPATIENT
Start: 2022-06-16 | End: 2022-06-24 | Stop reason: SDUPTHER

## 2022-06-21 ENCOUNTER — HOSPITAL ENCOUNTER (EMERGENCY)
Facility: HOSPITAL | Age: 82
Discharge: HOME | End: 2022-06-21
Attending: EMERGENCY MEDICINE | Admitting: EMERGENCY MEDICINE

## 2022-06-21 ENCOUNTER — APPOINTMENT (OUTPATIENT)
Dept: CT IMAGING | Facility: HOSPITAL | Age: 82
End: 2022-06-21

## 2022-06-21 ENCOUNTER — APPOINTMENT (OUTPATIENT)
Dept: GENERAL RADIOLOGY | Facility: HOSPITAL | Age: 82
End: 2022-06-21

## 2022-06-21 VITALS
TEMPERATURE: 96.9 F | RESPIRATION RATE: 16 BRPM | HEART RATE: 62 BPM | BODY MASS INDEX: 28.59 KG/M2 | OXYGEN SATURATION: 94 % | DIASTOLIC BLOOD PRESSURE: 83 MMHG | HEIGHT: 68 IN | SYSTOLIC BLOOD PRESSURE: 210 MMHG

## 2022-06-21 DIAGNOSIS — Z86.79 HISTORY OF HYPERTENSION: ICD-10-CM

## 2022-06-21 DIAGNOSIS — S86.912A KNEE STRAIN, LEFT, INITIAL ENCOUNTER: ICD-10-CM

## 2022-06-21 DIAGNOSIS — E11.649 HYPOGLYCEMIC EVENT IN DIABETES: Primary | ICD-10-CM

## 2022-06-21 DIAGNOSIS — S00.03XA CONTUSION OF SCALP, INITIAL ENCOUNTER: ICD-10-CM

## 2022-06-21 DIAGNOSIS — I10 HYPERTENSION, UNSPECIFIED TYPE: ICD-10-CM

## 2022-06-21 LAB
ANION GAP SERPL CALCULATED.3IONS-SCNC: 10.5 MMOL/L (ref 5–15)
BASOPHILS # BLD AUTO: 0.08 10*3/MM3 (ref 0–0.2)
BASOPHILS NFR BLD AUTO: 0.6 % (ref 0–1.5)
BUN SERPL-MCNC: 16 MG/DL (ref 8–23)
BUN/CREAT SERPL: 14.8 (ref 7–25)
CALCIUM SPEC-SCNC: 9.5 MG/DL (ref 8.6–10.5)
CHLORIDE SERPL-SCNC: 101 MMOL/L (ref 98–107)
CO2 SERPL-SCNC: 26.5 MMOL/L (ref 22–29)
CREAT SERPL-MCNC: 1.08 MG/DL (ref 0.57–1)
DEPRECATED RDW RBC AUTO: 41.2 FL (ref 37–54)
EGFRCR SERPLBLD CKD-EPI 2021: 51.4 ML/MIN/1.73
EOSINOPHIL # BLD AUTO: 0.38 10*3/MM3 (ref 0–0.4)
EOSINOPHIL NFR BLD AUTO: 2.8 % (ref 0.3–6.2)
ERYTHROCYTE [DISTWIDTH] IN BLOOD BY AUTOMATED COUNT: 13.3 % (ref 12.3–15.4)
GLUCOSE BLDC GLUCOMTR-MCNC: 122 MG/DL (ref 70–130)
GLUCOSE BLDC GLUCOMTR-MCNC: 135 MG/DL (ref 70–130)
GLUCOSE SERPL-MCNC: 91 MG/DL (ref 65–99)
HCT VFR BLD AUTO: 37.5 % (ref 34–46.6)
HGB BLD-MCNC: 12.4 G/DL (ref 12–15.9)
IMM GRANULOCYTES # BLD AUTO: 0.07 10*3/MM3 (ref 0–0.05)
IMM GRANULOCYTES NFR BLD AUTO: 0.5 % (ref 0–0.5)
LYMPHOCYTES # BLD AUTO: 1.16 10*3/MM3 (ref 0.7–3.1)
LYMPHOCYTES NFR BLD AUTO: 8.5 % (ref 19.6–45.3)
MCH RBC QN AUTO: 28.7 PG (ref 26.6–33)
MCHC RBC AUTO-ENTMCNC: 33.1 G/DL (ref 31.5–35.7)
MCV RBC AUTO: 86.8 FL (ref 79–97)
MONOCYTES # BLD AUTO: 1.01 10*3/MM3 (ref 0.1–0.9)
MONOCYTES NFR BLD AUTO: 7.4 % (ref 5–12)
NEUTROPHILS NFR BLD AUTO: 10.9 10*3/MM3 (ref 1.7–7)
NEUTROPHILS NFR BLD AUTO: 80.2 % (ref 42.7–76)
NRBC BLD AUTO-RTO: 0 /100 WBC (ref 0–0.2)
PLATELET # BLD AUTO: 312 10*3/MM3 (ref 140–450)
PMV BLD AUTO: 9 FL (ref 6–12)
POTASSIUM SERPL-SCNC: 3.6 MMOL/L (ref 3.5–5.2)
RBC # BLD AUTO: 4.32 10*6/MM3 (ref 3.77–5.28)
SODIUM SERPL-SCNC: 138 MMOL/L (ref 136–145)
WBC NRBC COR # BLD: 13.6 10*3/MM3 (ref 3.4–10.8)

## 2022-06-21 PROCEDURE — 36415 COLL VENOUS BLD VENIPUNCTURE: CPT

## 2022-06-21 PROCEDURE — 82962 GLUCOSE BLOOD TEST: CPT

## 2022-06-21 PROCEDURE — 85025 COMPLETE CBC W/AUTO DIFF WBC: CPT | Performed by: EMERGENCY MEDICINE

## 2022-06-21 PROCEDURE — 99283 EMERGENCY DEPT VISIT LOW MDM: CPT

## 2022-06-21 PROCEDURE — 80048 BASIC METABOLIC PNL TOTAL CA: CPT | Performed by: EMERGENCY MEDICINE

## 2022-06-21 PROCEDURE — 70450 CT HEAD/BRAIN W/O DYE: CPT

## 2022-06-21 PROCEDURE — 73560 X-RAY EXAM OF KNEE 1 OR 2: CPT

## 2022-06-21 RX ORDER — CLONIDINE HYDROCHLORIDE 0.1 MG/1
0.1 TABLET ORAL ONCE
Status: COMPLETED | OUTPATIENT
Start: 2022-06-21 | End: 2022-06-21

## 2022-06-21 RX ORDER — HYDROCODONE BITARTRATE AND ACETAMINOPHEN 5; 325 MG/1; MG/1
1 TABLET ORAL ONCE
Status: COMPLETED | OUTPATIENT
Start: 2022-06-21 | End: 2022-06-21

## 2022-06-21 RX ADMIN — HYDROCODONE BITARTRATE AND ACETAMINOPHEN 1 TABLET: 5; 325 TABLET ORAL at 20:45

## 2022-06-21 RX ADMIN — CLONIDINE HYDROCHLORIDE 0.1 MG: 0.1 TABLET ORAL at 23:24

## 2022-06-22 ENCOUNTER — HOSPITAL ENCOUNTER (OUTPATIENT)
Facility: HOSPITAL | Age: 82
Setting detail: OBSERVATION
LOS: 1 days | Discharge: NURSING, REHAB OR LONG-TERM CARE | End: 2022-06-24
Attending: EMERGENCY MEDICINE | Admitting: INTERNAL MEDICINE

## 2022-06-22 ENCOUNTER — APPOINTMENT (OUTPATIENT)
Dept: CT IMAGING | Facility: HOSPITAL | Age: 82
End: 2022-06-22

## 2022-06-22 DIAGNOSIS — G93.41 ACUTE METABOLIC ENCEPHALOPATHY: Primary | ICD-10-CM

## 2022-06-22 DIAGNOSIS — R29.898 WEAKNESS OF BOTH LOWER EXTREMITIES: ICD-10-CM

## 2022-06-22 DIAGNOSIS — G93.41 METABOLIC ENCEPHALOPATHY: ICD-10-CM

## 2022-06-22 DIAGNOSIS — I10 BENIGN ESSENTIAL HYPERTENSION: Chronic | ICD-10-CM

## 2022-06-22 DIAGNOSIS — E11.59 TYPE 2 DIABETES MELLITUS WITH OTHER CIRCULATORY COMPLICATION, WITH LONG-TERM CURRENT USE OF INSULIN: ICD-10-CM

## 2022-06-22 DIAGNOSIS — Z79.4 TYPE 2 DIABETES MELLITUS WITH OTHER CIRCULATORY COMPLICATION, WITH LONG-TERM CURRENT USE OF INSULIN: ICD-10-CM

## 2022-06-22 DIAGNOSIS — M51.34 THORACIC DEGENERATIVE DISC DISEASE: ICD-10-CM

## 2022-06-22 LAB
ALBUMIN SERPL-MCNC: 3.8 G/DL (ref 3.5–5.2)
ALBUMIN/GLOB SERPL: 1.1 G/DL
ALP SERPL-CCNC: 100 U/L (ref 39–117)
ALT SERPL W P-5'-P-CCNC: 9 U/L (ref 1–33)
ANION GAP SERPL CALCULATED.3IONS-SCNC: 13 MMOL/L (ref 5–15)
AST SERPL-CCNC: 12 U/L (ref 1–32)
BACTERIA UR QL AUTO: NORMAL /HPF
BASOPHILS # BLD AUTO: 0.07 10*3/MM3 (ref 0–0.2)
BASOPHILS NFR BLD AUTO: 0.6 % (ref 0–1.5)
BILIRUB SERPL-MCNC: 0.6 MG/DL (ref 0–1.2)
BILIRUB UR QL STRIP: NEGATIVE
BUN SERPL-MCNC: 23 MG/DL (ref 8–23)
BUN/CREAT SERPL: 19.8 (ref 7–25)
CALCIUM SPEC-SCNC: 9.2 MG/DL (ref 8.6–10.5)
CHLORIDE SERPL-SCNC: 102 MMOL/L (ref 98–107)
CLARITY UR: CLEAR
CO2 SERPL-SCNC: 24 MMOL/L (ref 22–29)
COLOR UR: YELLOW
CREAT SERPL-MCNC: 1.16 MG/DL (ref 0.57–1)
DEPRECATED RDW RBC AUTO: 41.5 FL (ref 37–54)
EGFRCR SERPLBLD CKD-EPI 2021: 47.2 ML/MIN/1.73
EOSINOPHIL # BLD AUTO: 0.28 10*3/MM3 (ref 0–0.4)
EOSINOPHIL NFR BLD AUTO: 2.4 % (ref 0.3–6.2)
ERYTHROCYTE [DISTWIDTH] IN BLOOD BY AUTOMATED COUNT: 13.7 % (ref 12.3–15.4)
GLOBULIN UR ELPH-MCNC: 3.4 GM/DL
GLUCOSE BLDC GLUCOMTR-MCNC: 124 MG/DL (ref 70–130)
GLUCOSE SERPL-MCNC: 126 MG/DL (ref 65–99)
GLUCOSE UR STRIP-MCNC: NEGATIVE MG/DL
HCT VFR BLD AUTO: 36.2 % (ref 34–46.6)
HGB BLD-MCNC: 12.1 G/DL (ref 12–15.9)
HGB UR QL STRIP.AUTO: NEGATIVE
HYALINE CASTS UR QL AUTO: NORMAL /LPF
KETONES UR QL STRIP: ABNORMAL
LEUKOCYTE ESTERASE UR QL STRIP.AUTO: NEGATIVE
LYMPHOCYTES # BLD AUTO: 1.52 10*3/MM3 (ref 0.7–3.1)
LYMPHOCYTES NFR BLD AUTO: 13.2 % (ref 19.6–45.3)
MCH RBC QN AUTO: 28.7 PG (ref 26.6–33)
MCHC RBC AUTO-ENTMCNC: 33.4 G/DL (ref 31.5–35.7)
MCV RBC AUTO: 86 FL (ref 79–97)
MONOCYTES # BLD AUTO: 1.08 10*3/MM3 (ref 0.1–0.9)
MONOCYTES NFR BLD AUTO: 9.4 % (ref 5–12)
NEUTROPHILS NFR BLD AUTO: 73.6 % (ref 42.7–76)
NEUTROPHILS NFR BLD AUTO: 8.48 10*3/MM3 (ref 1.7–7)
NITRITE UR QL STRIP: NEGATIVE
PH UR STRIP.AUTO: 5.5 [PH] (ref 5–8)
PLATELET # BLD AUTO: 250 10*3/MM3 (ref 140–450)
PMV BLD AUTO: 9.5 FL (ref 6–12)
POTASSIUM SERPL-SCNC: 4.2 MMOL/L (ref 3.5–5.2)
PROT SERPL-MCNC: 7.2 G/DL (ref 6–8.5)
PROT UR QL STRIP: ABNORMAL
RBC # BLD AUTO: 4.21 10*6/MM3 (ref 3.77–5.28)
RBC # UR STRIP: NORMAL /HPF
REF LAB TEST METHOD: NORMAL
SARS-COV-2 RNA RESP QL NAA+PROBE: NOT DETECTED
SODIUM SERPL-SCNC: 139 MMOL/L (ref 136–145)
SP GR UR STRIP: 1.02 (ref 1–1.03)
SQUAMOUS #/AREA URNS HPF: NORMAL /HPF
UROBILINOGEN UR QL STRIP: ABNORMAL
WBC # UR STRIP: NORMAL /HPF
WBC NRBC COR # BLD: 11.52 10*3/MM3 (ref 3.4–10.8)

## 2022-06-22 PROCEDURE — 81001 URINALYSIS AUTO W/SCOPE: CPT | Performed by: EMERGENCY MEDICINE

## 2022-06-22 PROCEDURE — C9803 HOPD COVID-19 SPEC COLLECT: HCPCS

## 2022-06-22 PROCEDURE — 99284 EMERGENCY DEPT VISIT MOD MDM: CPT

## 2022-06-22 PROCEDURE — 70450 CT HEAD/BRAIN W/O DYE: CPT

## 2022-06-22 PROCEDURE — 85025 COMPLETE CBC W/AUTO DIFF WBC: CPT | Performed by: EMERGENCY MEDICINE

## 2022-06-22 PROCEDURE — 36415 COLL VENOUS BLD VENIPUNCTURE: CPT

## 2022-06-22 PROCEDURE — U0003 INFECTIOUS AGENT DETECTION BY NUCLEIC ACID (DNA OR RNA); SEVERE ACUTE RESPIRATORY SYNDROME CORONAVIRUS 2 (SARS-COV-2) (CORONAVIRUS DISEASE [COVID-19]), AMPLIFIED PROBE TECHNIQUE, MAKING USE OF HIGH THROUGHPUT TECHNOLOGIES AS DESCRIBED BY CMS-2020-01-R: HCPCS | Performed by: EMERGENCY MEDICINE

## 2022-06-22 PROCEDURE — 82962 GLUCOSE BLOOD TEST: CPT

## 2022-06-22 PROCEDURE — P9612 CATHETERIZE FOR URINE SPEC: HCPCS

## 2022-06-22 PROCEDURE — 80053 COMPREHEN METABOLIC PANEL: CPT | Performed by: EMERGENCY MEDICINE

## 2022-06-22 PROCEDURE — 99285 EMERGENCY DEPT VISIT HI MDM: CPT

## 2022-06-22 RX ORDER — SODIUM CHLORIDE 9 MG/ML
75 INJECTION, SOLUTION INTRAVENOUS CONTINUOUS
Status: DISCONTINUED | OUTPATIENT
Start: 2022-06-22 | End: 2022-06-24

## 2022-06-22 RX ORDER — ACETAMINOPHEN 650 MG/1
650 SUPPOSITORY RECTAL EVERY 4 HOURS PRN
Status: DISCONTINUED | OUTPATIENT
Start: 2022-06-22 | End: 2022-06-24 | Stop reason: HOSPADM

## 2022-06-22 RX ORDER — ATORVASTATIN CALCIUM 80 MG/1
80 TABLET, FILM COATED ORAL NIGHTLY
Status: DISCONTINUED | OUTPATIENT
Start: 2022-06-22 | End: 2022-06-24 | Stop reason: HOSPADM

## 2022-06-22 RX ORDER — ASPIRIN 325 MG
325 TABLET ORAL DAILY
Status: DISCONTINUED | OUTPATIENT
Start: 2022-06-23 | End: 2022-06-24

## 2022-06-22 RX ORDER — BISACODYL 10 MG
10 SUPPOSITORY, RECTAL RECTAL DAILY PRN
Status: DISCONTINUED | OUTPATIENT
Start: 2022-06-22 | End: 2022-06-24 | Stop reason: HOSPADM

## 2022-06-22 RX ORDER — SODIUM CHLORIDE 0.9 % (FLUSH) 0.9 %
10 SYRINGE (ML) INJECTION AS NEEDED
Status: DISCONTINUED | OUTPATIENT
Start: 2022-06-22 | End: 2022-06-24 | Stop reason: HOSPADM

## 2022-06-22 RX ORDER — ASPIRIN 300 MG/1
300 SUPPOSITORY RECTAL DAILY
Status: DISCONTINUED | OUTPATIENT
Start: 2022-06-23 | End: 2022-06-24

## 2022-06-22 RX ORDER — ACETAMINOPHEN 325 MG/1
650 TABLET ORAL EVERY 4 HOURS PRN
Status: DISCONTINUED | OUTPATIENT
Start: 2022-06-22 | End: 2022-06-24 | Stop reason: HOSPADM

## 2022-06-22 RX ORDER — ONDANSETRON 2 MG/ML
4 INJECTION INTRAMUSCULAR; INTRAVENOUS EVERY 6 HOURS PRN
Status: DISCONTINUED | OUTPATIENT
Start: 2022-06-22 | End: 2022-06-24 | Stop reason: HOSPADM

## 2022-06-22 RX ORDER — LABETALOL HYDROCHLORIDE 5 MG/ML
10 INJECTION, SOLUTION INTRAVENOUS
Status: DISCONTINUED | OUTPATIENT
Start: 2022-06-22 | End: 2022-06-24

## 2022-06-22 NOTE — ED NOTES
Pt states that she was in kitchen when her blood sugar dropped. Pt became dizzy and fell backwards hitting head on floor. Pt drank some juice and ate food and glucose tablet. Pt c/o posterior head pain and left lower leg pain. Denies loc. No blood thinners    Pt wearing mask on arrival. Staff wearing mask and goggles at time of triage.

## 2022-06-22 NOTE — ED PROVIDER NOTES
EMERGENCY DEPARTMENT ENCOUNTER    Room Number:  17/17  Date of encounter:  6/22/2022  PCP: Napoleon Mead MD  Historian: Patient, family      HPI:  Chief Complaint: Fall, hypoglycemia  A complete HPI/ROS/PMH/PSH/SH/FH are unobtainable due to: None    Context: Halie Guidry is a 82 y.o. female who presents to the ED via private vehicle for evaluation after she sustained a fall at home in the kitchen.  Patient states that she had checked her blood sugar and it was 180 prior to dinner, was making food when she started feeling lightheaded and fell backwards hitting her head.  She does not think that she passed out.  Complains of a bump and a headache in the back of her head.  No dizziness, nausea, vomiting.  Family checked blood sugar and it was 64, they did give her food and orange juice and a glucose tablet and improved to 87.  Patient denies any neck pain, or hip pain, does complain of some left knee pain.  Reports some mild diarrhea over the past few days.  Has not taken any evening time diabetic medications, took some morning insulin.      MEDICAL RECORD REVIEW    No anticoagulants noted on chart review in epic    PAST MEDICAL HISTORY  Active Ambulatory Problems     Diagnosis Date Noted   • Compression fracture 04/22/2009   • Lumbar degenerative disc disease 07/05/2012   • Type 2 diabetes mellitus, with long-term current use of insulin ([facility])    • Hyperlipidemia    • Benign essential hypertension 08/20/2014   • Primary hypothyroidism    • Leukocytosis    • Neuropathy    • Osteoporosis 09/09/2015   • Proteinuria 02/28/2012   • Tobacco abuse 08/22/2013   • Diabetic eye exam (HCC) 02/12/2014   • Generalized weakness 05/27/2017   • Spinal stenosis 05/27/2017   • Scoliosis 05/27/2017   • Arthritis 05/27/2017   • VBI (vertebrobasilar insufficiency) 05/28/2017   • Orthostatic hypotension 05/28/2017   • 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 (HCC) 05/28/2017   • Severe hypothyroidism 05/30/2017   •  Noncompliance with medication regimen 05/30/2017   • Vitamin D deficiency disease 06/01/2017   • Altered mental status 12/15/2017   • Tremor 01/09/2018   • Seizure ([facility]) 05/21/2019   • Stage 3b chronic kidney disease ([facility]) 03/09/2012   • Thoracic degenerative disc disease 01/27/2020   • Metabolic encephalopathy 12/02/2021   • VIPUL (acute kidney injury) ([facility]) 12/02/2021   • Hyperglycemia 12/02/2021   • Type II diabetes mellitus, uncontrolled 12/02/2021   • Lower abdominal pain 02/23/2022   • Transaminitis 02/23/2022   • COVID-19 virus detected 02/23/2022   • UTI (urinary tract infection) 02/23/2022   • 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   • Choledocholithiasis 02/23/2022   • Hypokalemia 02/24/2022   • Hypoxia 02/24/2022   • Generalized abdominal pain 03/26/2022   • History of Clostridium difficile infection 03/26/2022   • History of ERCP 03/26/2022   • Acute UTI (urinary tract infection) 03/27/2022   • Hypomagnesemia 03/28/2022   • Burning pain 05/27/2022   • Anxiety disorder 05/27/2022   • Neuropathic pain 05/27/2022   • Intertrigo 05/27/2022     Resolved Ambulatory Problems     Diagnosis Date Noted   • No Resolved Ambulatory Problems     Past Medical History:   Diagnosis Date   • Anxiety    • 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[facility])    • Depression    • Diabetes ([facility])    • Diabetes mellitus, type 2 ([facility])    • Disc degeneration, lumbar    • Headache, tension-type    • Hypothyroidism    • Peripheral neuropathy    • Rotator cuff tear, left    • Shoulder pain          PAST SURGICAL HISTORY  Past Surgical History:   Procedure Laterality Date   • APPENDECTOMY     • BILATERAL BREAST REDUCTION Bilateral 08/2015   • CATARACT EXTRACTION  03/2015   • CHOLECYSTECTOMY WITH INTRAOPERATIVE CHOLANGIOGRAM N/A 3/27/2022    Procedure: CHOLECYSTECTOMY LAPAROSCOPIC INTRAOPERATIVE CHOLANGIOGRAM;  Surgeon: Aiyana Hill MD;  Location: [facility] OR;  Service: General;  Laterality: N/A;   • COLONOSCOPY  06/05/2015    WNL   • ERCP N/A 2/25/2022     Procedure: ENDOSCOPIC RETROGRADE CHOLANGIOPANCREATOGRAPHY with sphincterotomy and balloon sweep;  Surgeon: Chilo Wilhelm MD;  Location: [facility] ENDOSCOPY;  Service: Gastroenterology;  Laterality: N/A;  PRE/POST - CBD stones   • ERCP N/A 3/28/2022    Procedure: ENDOSCOPIC RETROGRADE CHOLANGIOPANCREATOGRAPHY WITH SPHINCTEROTOMY AND BALLOON SWEEP;  Surgeon: Chilo Wilhelm MD;  Location: [facility] ENDOSCOPY;  Service: Gastroenterology;  Laterality: N/A;  PRE: COMMON DUCT STONE  POST: COMMON DUCT STONE   • KYPHOPLASTY     • REDUCTION MAMMAPLASTY     • TONSILLECTOMY           FAMILY HISTORY  Family History   Problem Relation Age of Onset   • Bone cancer Mother    • No Known Problems Father          SOCIAL HISTORY  Social History     Socioeconomic History   • Marital status:    Tobacco Use   • Smoking status: Former Smoker     Packs/day: 1.00     Quit date:      Years since quittin.4   • Smokeless tobacco: Never Used   Vaping Use   • Vaping Use: Never used   Substance and Sexual Activity   • Alcohol use: No   • Drug use: No   • Sexual activity: Defer         ALLERGIES  Sulfa antibiotics        REVIEW OF SYSTEMS  Review of Systems     All systems reviewed and negative except for those discussed in HPI.       PHYSICAL EXAM    I have reviewed the triage vital signs and nursing notes.    ED Triage Vitals [22]   Temp Heart Rate Resp BP SpO2   96.9 °F (36.1 °C) 58 16 (!) 183/77 96 %      Temp src Heart Rate Source Patient Position BP Location FiO2 (%)   Tympanic Monitor Sitting Right arm --       Physical Exam  General: No acute distress, nontoxic  HEENT: Mucous membranes moist, left occipital hematoma without overlying laceration, EOMI, PERRL  Neck: Full ROM, nontender  Pulm: Symmetric chest rise, nonlabored, lungs CTAB  Cardiovascular: Regular rate and rhythm, intact distal pulses  GI: Soft, nontender, nondistended, no rebound, no guarding, bowel sounds present  MSK: Nontender left hip with  intact range of motion, some mild generalized left knee tenderness without overlying edema with intact flexion extension of the knee, no shortening or abnormal rotation of the left leg  Skin: Warm, dry  Neuro: Awake, alert, oriented x 4, GCS 15, no dysarthria or aphasia, 5 or 5 strength sensation bilateral upper and lower extremities, moving all extremities, no focal deficits  Psych: Calm, cooperative      N95, protective eye goggles, and gloves used during this encounter. Patient in surgical mask.      LAB RESULTS  Recent Results (from the past 24 hour(s))   POC Glucose Once    Collection Time: 06/21/22  8:24 PM    Specimen: Blood   Result Value Ref Range    Glucose 122 70 - 130 mg/dL   Basic Metabolic Panel    Collection Time: 06/21/22  8:41 PM    Specimen: Blood   Result Value Ref Range    Glucose 91 65 - 99 mg/dL    BUN 16 8 - 23 mg/dL    Creatinine 1.08 (H) 0.57 - 1.00 mg/dL    Sodium 138 136 - 145 mmol/L    Potassium 3.6 3.5 - 5.2 mmol/L    Chloride 101 98 - 107 mmol/L    CO2 26.5 22.0 - 29.0 mmol/L    Calcium 9.5 8.6 - 10.5 mg/dL    BUN/Creatinine Ratio 14.8 7.0 - 25.0    Anion Gap 10.5 5.0 - 15.0 mmol/L    eGFR 51.4 (L) >60.0 mL/min/1.73   CBC Auto Differential    Collection Time: 06/21/22  8:41 PM    Specimen: Blood   Result Value Ref Range    WBC 13.60 (H) 3.40 - 10.80 10*3/mm3    RBC 4.32 3.77 - 5.28 10*6/mm3    Hemoglobin 12.4 12.0 - 15.9 g/dL    Hematocrit 37.5 34.0 - 46.6 %    MCV 86.8 79.0 - 97.0 fL    MCH 28.7 26.6 - 33.0 pg    MCHC 33.1 31.5 - 35.7 g/dL    RDW 13.3 12.3 - 15.4 %    RDW-SD 41.2 37.0 - 54.0 fl    MPV 9.0 6.0 - 12.0 fL    Platelets 312 140 - 450 10*3/mm3    Neutrophil % 80.2 (H) 42.7 - 76.0 %    Lymphocyte % 8.5 (L) 19.6 - 45.3 %    Monocyte % 7.4 5.0 - 12.0 %    Eosinophil % 2.8 0.3 - 6.2 %    Basophil % 0.6 0.0 - 1.5 %    Immature Grans % 0.5 0.0 - 0.5 %    Neutrophils, Absolute 10.90 (H) 1.70 - 7.00 10*3/mm3    Lymphocytes, Absolute 1.16 0.70 - 3.10 10*3/mm3    Monocytes, Absolute  1.01 (H) 0.10 - 0.90 10*3/mm3    Eosinophils, Absolute 0.38 0.00 - 0.40 10*3/mm3    Basophils, Absolute 0.08 0.00 - 0.20 10*3/mm3    Immature Grans, Absolute 0.07 (H) 0.00 - 0.05 10*3/mm3    nRBC 0.0 0.0 - 0.2 /100 WBC   POC Glucose Once    Collection Time: 06/21/22 10:19 PM    Specimen: Blood   Result Value Ref Range    Glucose 135 (H) 70 - 130 mg/dL       Ordered the above labs and independently reviewed the results.        RADIOLOGY  XR Knee 1 or 2 View Left    Result Date: 6/21/2022  LEFT KNEE X-RAYS  CLINICAL HISTORY: Patient fell. Knee pain.  AP and lateral views demonstrate extensive tricompartmental osteoarthritis. There is marked narrowing of all 3 compartments of the knee joint. There is mild diffuse osteopenia. There is no evidence of recent or old fracture or subluxation. Extensive vascular calcification is noted.  This report was finalized on 6/21/2022 8:56 PM by Dr. Franco Bishop M.D.      CT Head Without Contrast    Result Date: 6/21/2022  CT HEAD WO CONTRAST-  CLINICAL HISTORY: Patient fell. Posterior head injury. Possible loss of consciousness.  TECHNIQUE: Transverse 3 mm thick images were acquired from the base of the skull to the vertex without IV contrast.  Radiation dose reduction techniques were utilized, including automated exposure control and exposure modulation based on body size.  COMPARISON: CT scan of the head dated 12/01/2021.  FINDINGS: The brain and ventricular system appear structurally within normal limits for patient of this age. There is ill-defined abnormal diminished attenuation throughout the white matter of both cerebral hemispheres that is consistent with sequela of moderately extensive small vessel chronic ischemic change. Several small chronic white matter infarcts are also present bilaterally that are unchanged there is no evidence of acute infarct or hemorrhage. There is no mass effect. Bone window images demonstrate no evidence of skull fracture. The visualized  paranasal sinuses and mastoid air cells and middle ear cavities are well aerated.      Evidence of moderately extensive small vessel chronic ischemic change as described. Otherwise unremarkable CT scan of the head. No acute intracranial abnormality is identified.  This report was finalized on 6/21/2022 10:10 PM by Dr. Franco Bishop M.D.        I ordered the above noted radiological studies. Reviewed by me.  See dictation for official radiology interpretation.      PROCEDURES    Procedures      MEDICATIONS GIVEN IN ER    Medications   HYDROcodone-acetaminophen (NORCO) 5-325 MG per tablet 1 tablet (1 tablet Oral Given 6/21/22 2045)   cloNIDine (CATAPRES) tablet 0.1 mg (0.1 mg Oral Given 6/21/22 2324)         PROGRESS, DATA ANALYSIS, CONSULTS, AND MEDICAL DECISION MAKING    All labs have been independently reviewed by me.  All radiology studies have been reviewed by me and discussed with radiologist dictating the report.   EKG's independently viewed and interpreted by me.  Discussion below represents my analysis of pertinent findings related to patient's condition, differential diagnosis, treatment plan and final disposition.    Initial concern for hypoglycemic event, electrolyte abnormalities, dehydration, skull fracture, intracranial hemorrhage, knee fracture, among others.  Plan for labs, CT scans, x-ray, and reevaluate with results.    ED Course as of 06/22/22 0212 Tue Jun 21, 2022 2100 WBC(!): 13.60 [DC]   2100 Hemoglobin: 12.4 [DC]   2100 Platelets: 312 [DC]   2100 Glucose: 122 [DC]   2144 Glucose: 91 [DC]   2145 BUN: 16 [DC]   2145 Creatinine(!): 1.08 [DC]   2145 Sodium: 138 [DC]   2145 Potassium: 3.6 [DC]   2145 XR Knee 1 or 2 View Left  Reviewed [DC]   2216 CT Head Without Contrast  Reviewed [DC]      ED Course User Index  [DC] Ziggy Prince MD     Patient was feeling little bit lightheaded after results today, gave her some food and she has resolved.  Glucose is holding up over 100.  Plan for discharge  home, continue current medications, PCP follow-up as needed, supportive care measures for the injuries, ED return for worsening symptoms as needed.      DIAGNOSIS  Final diagnoses:   Hypoglycemic event in diabetes (HCC)   Contusion of scalp, initial encounter   Knee strain, left, initial encounter   Hypertension, unspecified type   History of hypertension         DISPOSITION  DISCHARGE    Patient discharged in stable condition.    Reviewed implications of results, diagnosis, meds, responsibility to follow up, warning signs and symptoms of possible worsening, potential complications and reasons to return to ER.    Patient/Family voiced understanding of above instructions.    Discussed plan for discharge, as there is no emergent indication for admission. Patient referred to primary care provider for BP management due to today's BP. Pt/family is agreeable and understands need for follow up and repeat testing.  Pt is aware that discharge does not mean that nothing is wrong but it indicates no emergency is present that requires admission and they must continue care with follow-up as given below or physician of their choice.     FOLLOW-UP  [facility] Emergency Department  [address] [facility] [address] [phone]    As needed, If symptoms worsen    Napoleon Mead MD  [id number] [id number] [phone]    Schedule an appointment as soon as possible for a visit   As needed         Medication List      Changed    lisinopril 20 MG tablet  Commonly known as: PRINIVIL,ZESTRIL  Take 1 tablet by mouth Daily for 30 days.  What changed: how much to take                       Ziggy Prince MD  06/22/22 1429

## 2022-06-22 NOTE — DISCHARGE INSTRUCTIONS
Ice for pain or swelling, continue current medications for pain, continue all other current medications, close PCP follow-up as needed for recheck, ED return for worsening symptoms as needed.

## 2022-06-22 NOTE — ED NOTES
Pt to triage from home with c/o low blood sugar (64) and fall.  Pt states has bump to back of her head. Pt also states left leg hurts.  Pt drank some orange juice and got bs to 87. Pt also received glucose tablet.  Pt wearing mask in triage. Triage personnel wore appropriate PPE    BS at triage 122

## 2022-06-23 ENCOUNTER — APPOINTMENT (OUTPATIENT)
Dept: MRI IMAGING | Facility: HOSPITAL | Age: 82
End: 2022-06-23

## 2022-06-23 ENCOUNTER — APPOINTMENT (OUTPATIENT)
Dept: CARDIOLOGY | Facility: HOSPITAL | Age: 82
End: 2022-06-23

## 2022-06-23 PROBLEM — E16.2 HYPOGLYCEMIA: Status: ACTIVE | Noted: 2022-06-23

## 2022-06-23 LAB
ALBUMIN SERPL-MCNC: 3.6 G/DL (ref 3.5–5.2)
ALBUMIN/GLOB SERPL: 1.2 G/DL
ALP SERPL-CCNC: 88 U/L (ref 39–117)
ALT SERPL W P-5'-P-CCNC: 7 U/L (ref 1–33)
ANION GAP SERPL CALCULATED.3IONS-SCNC: 12 MMOL/L (ref 5–15)
AORTIC DIMENSIONLESS INDEX: 0.7 (DI)
AST SERPL-CCNC: 12 U/L (ref 1–32)
BH CV ECHO MEAS - AO MAX PG: 7.7 MMHG
BH CV ECHO MEAS - AO MEAN PG: 4.1 MMHG
BH CV ECHO MEAS - AO ROOT DIAM: 2.9 CM
BH CV ECHO MEAS - AO V2 MAX: 138.8 CM/SEC
BH CV ECHO MEAS - AO V2 VTI: 33.2 CM
BH CV ECHO MEAS - AVA(I,D): 2.37 CM2
BH CV ECHO MEAS - EDV(CUBED): 124.3 ML
BH CV ECHO MEAS - EDV(MOD-SP2): 117 ML
BH CV ECHO MEAS - EDV(MOD-SP4): 107 ML
BH CV ECHO MEAS - EF(MOD-BP): 68 %
BH CV ECHO MEAS - EF(MOD-SP2): 67.5 %
BH CV ECHO MEAS - EF(MOD-SP4): 66.4 %
BH CV ECHO MEAS - ESV(CUBED): 15.6 ML
BH CV ECHO MEAS - ESV(MOD-SP2): 38 ML
BH CV ECHO MEAS - ESV(MOD-SP4): 36 ML
BH CV ECHO MEAS - FS: 49.9 %
BH CV ECHO MEAS - IVS/LVPW: 0.96 CM
BH CV ECHO MEAS - IVSD: 1.17 CM
BH CV ECHO MEAS - LAT PEAK E' VEL: 9.2 CM/SEC
BH CV ECHO MEAS - LV DIASTOLIC VOL/BSA (35-75): 53.7 CM2
BH CV ECHO MEAS - LV MASS(C)D: 229.6 GRAMS
BH CV ECHO MEAS - LV MAX PG: 4.7 MMHG
BH CV ECHO MEAS - LV MEAN PG: 2.41 MMHG
BH CV ECHO MEAS - LV SYSTOLIC VOL/BSA (12-30): 18.1 CM2
BH CV ECHO MEAS - LV V1 MAX: 108.3 CM/SEC
BH CV ECHO MEAS - LV V1 VTI: 24.4 CM
BH CV ECHO MEAS - LVIDD: 5 CM
BH CV ECHO MEAS - LVIDS: 2.5 CM
BH CV ECHO MEAS - LVOT AREA: 3.2 CM2
BH CV ECHO MEAS - LVOT DIAM: 2.03 CM
BH CV ECHO MEAS - LVPWD: 1.21 CM
BH CV ECHO MEAS - MED PEAK E' VEL: 5.1 CM/SEC
BH CV ECHO MEAS - MR MAX PG: 81.4 MMHG
BH CV ECHO MEAS - MR MAX VEL: 451.1 CM/SEC
BH CV ECHO MEAS - MV A DUR: 0.1 SEC
BH CV ECHO MEAS - MV A MAX VEL: 84.9 CM/SEC
BH CV ECHO MEAS - MV DEC SLOPE: 486.2 CM/SEC2
BH CV ECHO MEAS - MV DEC TIME: 203 MSEC
BH CV ECHO MEAS - MV E MAX VEL: 86.8 CM/SEC
BH CV ECHO MEAS - MV E/A: 1.02
BH CV ECHO MEAS - MV MAX PG: 4.5 MMHG
BH CV ECHO MEAS - MV MEAN PG: 2.23 MMHG
BH CV ECHO MEAS - MV P1/2T: 63.5 MSEC
BH CV ECHO MEAS - MV V2 VTI: 29.2 CM
BH CV ECHO MEAS - MVA(P1/2T): 3.5 CM2
BH CV ECHO MEAS - MVA(VTI): 2.7 CM2
BH CV ECHO MEAS - PA ACC TIME: 0.13 SEC
BH CV ECHO MEAS - PA PR(ACCEL): 20.8 MMHG
BH CV ECHO MEAS - PA V2 MAX: 112.7 CM/SEC
BH CV ECHO MEAS - PULM A REVS DUR: 0.12 SEC
BH CV ECHO MEAS - PULM A REVS VEL: 46.1 CM/SEC
BH CV ECHO MEAS - PULM DIAS VEL: 79.1 CM/SEC
BH CV ECHO MEAS - PULM S/D: 1.04
BH CV ECHO MEAS - PULM SYS VEL: 82.5 CM/SEC
BH CV ECHO MEAS - RAP SYSTOLE: 8 MMHG
BH CV ECHO MEAS - RV MAX PG: 3.2 MMHG
BH CV ECHO MEAS - RV V1 MAX: 89.1 CM/SEC
BH CV ECHO MEAS - RV V1 VTI: 21.2 CM
BH CV ECHO MEAS - SI(MOD-SP2): 39.7 ML/M2
BH CV ECHO MEAS - SI(MOD-SP4): 35.7 ML/M2
BH CV ECHO MEAS - SV(LVOT): 78.8 ML
BH CV ECHO MEAS - SV(MOD-SP2): 79 ML
BH CV ECHO MEAS - SV(MOD-SP4): 71 ML
BH CV ECHO MEAS - TAPSE (>1.6): 2 CM
BH CV ECHO MEASUREMENTS AVERAGE E/E' RATIO: 12.14
BH CV XLRA - RV BASE: 3 CM
BH CV XLRA - RV LENGTH: 6.5 CM
BH CV XLRA - RV MID: 2.6 CM
BH CV XLRA - TDI S': 9.1 CM/SEC
BILIRUB SERPL-MCNC: 0.5 MG/DL (ref 0–1.2)
BUN SERPL-MCNC: 23 MG/DL (ref 8–23)
BUN/CREAT SERPL: 21.9 (ref 7–25)
CALCIUM SPEC-SCNC: 8.9 MG/DL (ref 8.6–10.5)
CHLORIDE SERPL-SCNC: 103 MMOL/L (ref 98–107)
CHOLEST SERPL-MCNC: 120 MG/DL (ref 0–200)
CO2 SERPL-SCNC: 23 MMOL/L (ref 22–29)
CREAT SERPL-MCNC: 1.05 MG/DL (ref 0.57–1)
DEPRECATED RDW RBC AUTO: 41.8 FL (ref 37–54)
EGFRCR SERPLBLD CKD-EPI 2021: 53.2 ML/MIN/1.73
ERYTHROCYTE [DISTWIDTH] IN BLOOD BY AUTOMATED COUNT: 13.5 % (ref 12.3–15.4)
GLOBULIN UR ELPH-MCNC: 3 GM/DL
GLUCOSE BLDC GLUCOMTR-MCNC: 167 MG/DL (ref 70–130)
GLUCOSE BLDC GLUCOMTR-MCNC: 172 MG/DL (ref 70–130)
GLUCOSE BLDC GLUCOMTR-MCNC: 179 MG/DL (ref 70–130)
GLUCOSE BLDC GLUCOMTR-MCNC: 204 MG/DL (ref 70–130)
GLUCOSE SERPL-MCNC: 234 MG/DL (ref 65–99)
HBA1C MFR BLD: 7.6 % (ref 4.8–5.6)
HBA1C MFR BLD: 7.6 % (ref 4.8–5.6)
HCT VFR BLD AUTO: 32.6 % (ref 34–46.6)
HDLC SERPL-MCNC: 34 MG/DL (ref 40–60)
HGB BLD-MCNC: 10.9 G/DL (ref 12–15.9)
LDLC SERPL CALC-MCNC: 63 MG/DL (ref 0–100)
LDLC/HDLC SERPL: 1.78 {RATIO}
LEFT ATRIUM VOLUME INDEX: 24.6 ML/M2
MAXIMAL PREDICTED HEART RATE: 138 BPM
MCH RBC QN AUTO: 29.1 PG (ref 26.6–33)
MCHC RBC AUTO-ENTMCNC: 33.4 G/DL (ref 31.5–35.7)
MCV RBC AUTO: 86.9 FL (ref 79–97)
PLATELET # BLD AUTO: 253 10*3/MM3 (ref 140–450)
PMV BLD AUTO: 9.4 FL (ref 6–12)
POTASSIUM SERPL-SCNC: 3.9 MMOL/L (ref 3.5–5.2)
PROT SERPL-MCNC: 6.6 G/DL (ref 6–8.5)
RBC # BLD AUTO: 3.75 10*6/MM3 (ref 3.77–5.28)
SODIUM SERPL-SCNC: 138 MMOL/L (ref 136–145)
STRESS TARGET HR: 117 BPM
TRIGL SERPL-MCNC: 127 MG/DL (ref 0–150)
TSH SERPL DL<=0.05 MIU/L-ACNC: 3.87 UIU/ML (ref 0.27–4.2)
VLDLC SERPL-MCNC: 23 MG/DL (ref 5–40)
WBC NRBC COR # BLD: 9.94 10*3/MM3 (ref 3.4–10.8)

## 2022-06-23 PROCEDURE — 72148 MRI LUMBAR SPINE W/O DYE: CPT

## 2022-06-23 PROCEDURE — 92610 EVALUATE SWALLOWING FUNCTION: CPT

## 2022-06-23 PROCEDURE — 96374 THER/PROPH/DIAG INJ IV PUSH: CPT

## 2022-06-23 PROCEDURE — G0378 HOSPITAL OBSERVATION PER HR: HCPCS

## 2022-06-23 PROCEDURE — 93306 TTE W/DOPPLER COMPLETE: CPT

## 2022-06-23 PROCEDURE — 84443 ASSAY THYROID STIM HORMONE: CPT | Performed by: NURSE PRACTITIONER

## 2022-06-23 PROCEDURE — 96361 HYDRATE IV INFUSION ADD-ON: CPT

## 2022-06-23 PROCEDURE — 99204 OFFICE O/P NEW MOD 45 MIN: CPT | Performed by: PSYCHIATRY & NEUROLOGY

## 2022-06-23 PROCEDURE — 80061 LIPID PANEL: CPT | Performed by: NURSE PRACTITIONER

## 2022-06-23 PROCEDURE — 25010000002 PERFLUTREN (DEFINITY) 8.476 MG IN SODIUM CHLORIDE (PF) 0.9 % 10 ML INJECTION: Performed by: NURSE PRACTITIONER

## 2022-06-23 PROCEDURE — 97112 NEUROMUSCULAR REEDUCATION: CPT

## 2022-06-23 PROCEDURE — 83036 HEMOGLOBIN GLYCOSYLATED A1C: CPT | Performed by: NURSE PRACTITIONER

## 2022-06-23 PROCEDURE — 97165 OT EVAL LOW COMPLEX 30 MIN: CPT

## 2022-06-23 PROCEDURE — 63710000001 INSULIN LISPRO (HUMAN) PER 5 UNITS: Performed by: NURSE PRACTITIONER

## 2022-06-23 PROCEDURE — 82962 GLUCOSE BLOOD TEST: CPT

## 2022-06-23 PROCEDURE — 97162 PT EVAL MOD COMPLEX 30 MIN: CPT

## 2022-06-23 PROCEDURE — 80053 COMPREHEN METABOLIC PANEL: CPT | Performed by: NURSE PRACTITIONER

## 2022-06-23 PROCEDURE — 96375 TX/PRO/DX INJ NEW DRUG ADDON: CPT

## 2022-06-23 PROCEDURE — 93306 TTE W/DOPPLER COMPLETE: CPT | Performed by: INTERNAL MEDICINE

## 2022-06-23 PROCEDURE — 25010000002 LORAZEPAM PER 2 MG: Performed by: PSYCHIATRY & NEUROLOGY

## 2022-06-23 PROCEDURE — 85027 COMPLETE CBC AUTOMATED: CPT | Performed by: NURSE PRACTITIONER

## 2022-06-23 RX ORDER — LISINOPRIL 10 MG/1
5 TABLET ORAL DAILY
Status: DISCONTINUED | OUTPATIENT
Start: 2022-06-23 | End: 2022-06-24

## 2022-06-23 RX ORDER — INSULIN LISPRO 100 [IU]/ML
10 INJECTION, SOLUTION INTRAVENOUS; SUBCUTANEOUS
Status: DISCONTINUED | OUTPATIENT
Start: 2022-06-23 | End: 2022-06-23 | Stop reason: CLARIF

## 2022-06-23 RX ORDER — INSULIN LISPRO 100 [IU]/ML
10 INJECTION, SOLUTION INTRAVENOUS; SUBCUTANEOUS
Status: DISCONTINUED | OUTPATIENT
Start: 2022-06-23 | End: 2022-06-23

## 2022-06-23 RX ORDER — INSULIN LISPRO 100 [IU]/ML
0-9 INJECTION, SOLUTION INTRAVENOUS; SUBCUTANEOUS
Status: DISCONTINUED | OUTPATIENT
Start: 2022-06-23 | End: 2022-06-24 | Stop reason: HOSPADM

## 2022-06-23 RX ORDER — HYDROCHLOROTHIAZIDE 12.5 MG/1
12.5 CAPSULE, GELATIN COATED ORAL DAILY
Status: DISCONTINUED | OUTPATIENT
Start: 2022-06-23 | End: 2022-06-24 | Stop reason: HOSPADM

## 2022-06-23 RX ORDER — INSULIN LISPRO 100 [IU]/ML
5 INJECTION, SOLUTION INTRAVENOUS; SUBCUTANEOUS
Status: DISCONTINUED | OUTPATIENT
Start: 2022-06-23 | End: 2022-06-24

## 2022-06-23 RX ORDER — NICOTINE POLACRILEX 4 MG
15 LOZENGE BUCCAL
Status: DISCONTINUED | OUTPATIENT
Start: 2022-06-23 | End: 2022-06-24 | Stop reason: HOSPADM

## 2022-06-23 RX ORDER — HYDROCODONE BITARTRATE AND ACETAMINOPHEN 5; 325 MG/1; MG/1
1 TABLET ORAL EVERY 6 HOURS PRN
Status: DISCONTINUED | OUTPATIENT
Start: 2022-06-23 | End: 2022-06-24 | Stop reason: HOSPADM

## 2022-06-23 RX ORDER — ACETAMINOPHEN 325 MG/1
650 TABLET ORAL EVERY 6 HOURS PRN
Status: DISCONTINUED | OUTPATIENT
Start: 2022-06-23 | End: 2022-06-23 | Stop reason: SDUPTHER

## 2022-06-23 RX ORDER — LORAZEPAM 2 MG/ML
1 INJECTION INTRAMUSCULAR ONCE AS NEEDED
Status: COMPLETED | OUTPATIENT
Start: 2022-06-23 | End: 2022-06-23

## 2022-06-23 RX ORDER — FAMOTIDINE 20 MG/1
20 TABLET, FILM COATED ORAL
Status: DISCONTINUED | OUTPATIENT
Start: 2022-06-23 | End: 2022-06-24 | Stop reason: HOSPADM

## 2022-06-23 RX ORDER — DULOXETIN HYDROCHLORIDE 30 MG/1
30 CAPSULE, DELAYED RELEASE ORAL DAILY
Status: DISCONTINUED | OUTPATIENT
Start: 2022-06-23 | End: 2022-06-24 | Stop reason: HOSPADM

## 2022-06-23 RX ORDER — LEVOTHYROXINE SODIUM 112 UG/1
112 TABLET ORAL DAILY
Status: DISCONTINUED | OUTPATIENT
Start: 2022-06-23 | End: 2022-06-24 | Stop reason: HOSPADM

## 2022-06-23 RX ORDER — BISOPROLOL FUMARATE 5 MG/1
2.5 TABLET, FILM COATED ORAL DAILY
Status: DISCONTINUED | OUTPATIENT
Start: 2022-06-23 | End: 2022-06-24 | Stop reason: HOSPADM

## 2022-06-23 RX ORDER — PREGABALIN 50 MG/1
50 CAPSULE ORAL 3 TIMES DAILY
Status: DISCONTINUED | OUTPATIENT
Start: 2022-06-23 | End: 2022-06-24 | Stop reason: HOSPADM

## 2022-06-23 RX ORDER — DEXTROSE MONOHYDRATE 25 G/50ML
25 INJECTION, SOLUTION INTRAVENOUS
Status: DISCONTINUED | OUTPATIENT
Start: 2022-06-23 | End: 2022-06-24 | Stop reason: HOSPADM

## 2022-06-23 RX ADMIN — DULOXETINE HYDROCHLORIDE 30 MG: 30 CAPSULE, DELAYED RELEASE ORAL at 13:03

## 2022-06-23 RX ADMIN — FAMOTIDINE 20 MG: 20 TABLET ORAL at 17:51

## 2022-06-23 RX ADMIN — SODIUM CHLORIDE 75 ML/HR: 9 INJECTION, SOLUTION INTRAVENOUS at 02:44

## 2022-06-23 RX ADMIN — BISOPROLOL FUMARATE 2.5 MG: 5 TABLET, FILM COATED ORAL at 13:03

## 2022-06-23 RX ADMIN — PREGABALIN 50 MG: 50 CAPSULE ORAL at 20:35

## 2022-06-23 RX ADMIN — ATORVASTATIN CALCIUM 80 MG: 80 TABLET, FILM COATED ORAL at 20:35

## 2022-06-23 RX ADMIN — INSULIN LISPRO 2 UNITS: 100 INJECTION, SOLUTION INTRAVENOUS; SUBCUTANEOUS at 13:03

## 2022-06-23 RX ADMIN — LABETALOL HYDROCHLORIDE 10 MG: 5 INJECTION, SOLUTION INTRAVENOUS at 10:50

## 2022-06-23 RX ADMIN — LISINOPRIL 5 MG: 10 TABLET ORAL at 13:02

## 2022-06-23 RX ADMIN — HYDROCODONE BITARTRATE AND ACETAMINOPHEN 1 TABLET: 5; 325 TABLET ORAL at 20:35

## 2022-06-23 RX ADMIN — INSULIN LISPRO 5 UNITS: 100 INJECTION, SOLUTION INTRAVENOUS; SUBCUTANEOUS at 17:51

## 2022-06-23 RX ADMIN — ACETAMINOPHEN 650 MG: 325 TABLET ORAL at 10:49

## 2022-06-23 RX ADMIN — ASPIRIN 325 MG: 325 TABLET ORAL at 10:49

## 2022-06-23 RX ADMIN — INSULIN GLARGINE-YFGN 15 UNITS: 100 INJECTION, SOLUTION SUBCUTANEOUS at 21:36

## 2022-06-23 RX ADMIN — LORAZEPAM 1 MG: 2 INJECTION INTRAMUSCULAR; INTRAVENOUS at 14:34

## 2022-06-23 RX ADMIN — LEVOTHYROXINE SODIUM 112 MCG: 0.11 TABLET ORAL at 13:03

## 2022-06-23 RX ADMIN — HYDROCHLOROTHIAZIDE 12.5 MG: 12.5 CAPSULE ORAL at 13:03

## 2022-06-23 RX ADMIN — INSULIN LISPRO 2 UNITS: 100 INJECTION, SOLUTION INTRAVENOUS; SUBCUTANEOUS at 17:51

## 2022-06-23 RX ADMIN — INSULIN LISPRO 5 UNITS: 100 INJECTION, SOLUTION INTRAVENOUS; SUBCUTANEOUS at 13:02

## 2022-06-23 RX ADMIN — INSULIN LISPRO 2 UNITS: 100 INJECTION, SOLUTION INTRAVENOUS; SUBCUTANEOUS at 21:30

## 2022-06-23 RX ADMIN — PERFLUTREN 3 ML: 6.52 INJECTION, SUSPENSION INTRAVENOUS at 10:19

## 2022-06-23 NOTE — PROGRESS NOTES
Verbiage for stroke screen  Acute Inpatient Rehab Note    Acute rehab referral received via stroke order set. Please note that the acute rehab admission RNs will not be actively evaluating this patient. If it is felt that the patient is or will be acute rehab appropriate please call the admissions office at [phone] and a full evaluation will be initiated.    Katlin Layton, RN  Rehab Admission Nurse

## 2022-06-23 NOTE — PLAN OF CARE
"Goal Outcome Evaluation:  Plan of Care Reviewed With: patient           Outcome Evaluation: Pt is an 83 yo female who presented from home with confusion, B LE weakness, reporting inability to get out of chair at home. Pt with recent fall and head injury 2/2 hypoglycemia, was seen in ED 6/21, head CT was (-) and pt was discharged home 6/22.  Pt now presents back stating her \"legs wouldn't work\" at home. Prior to admission, pt was living at home with son and independent with all mobility using rwx. Upon exam, pt demonstrates impaired B ankle ROM, B LE weakness, impaired balance, and decreased endurance limiting mobility. B LE erythema observed and pt reports B LEs are tender and painful with movement. Pt required mod A with bed mobility and was unable to stand at EOB despite multiple attempts and assist x2. Pt would benefit from continued PT to address impairments and increase independence with functional mobility. Recommend DC to SNF for subacute rehab as pt is currently unsafe to return home.  "

## 2022-06-23 NOTE — ED NOTES
Pt to triage from home with c/o inability to ambulate.  Pt seen here last night for fall, discharged home.  Pt son states pt has become more confused today, and is not able to walk.  Pt wearing mask in triage. Triage personnel wore appropriate PPE

## 2022-06-23 NOTE — CONSULTS
Diabetes Education    Patient Name:  Halie Guidry  YOB: 1940  MRN: [id number]  Admit Date:  6/22/2022    Consult received for elevated A1c over 7.5%.    Current A1c 7.6%.  Pt just recently seen June 9th, 2022 by her endocrinologist Dr. Barrow,  Per MD notes, her A1c is controlled per pt's age.  Will sign off.  Please re-consult if needed.      Electronically signed by:  Shani Villagomez RN, [facility]  06/23/22 07:51 EDT

## 2022-06-23 NOTE — H&P
Patient Name:  Halie Guidry  YOB: 1940  MRN:  [id number]  Admit Date:  6/22/2022  Patient Care Team:  Napoleon Mead MD as PCP - General  Napoleon Mead MD as PCP - Family Medicine  Michelle Palacio RN as Ambulatory  ([facility] [place])      Subjective   History Present Illness     Chief Complaint   Patient presents with   • Gait Problem   • Altered Mental Status       Ms. Guidry is a 82 y.o. with known history of diabetes mellitus, hypertension, hypothyroidism, hyperlipidemia, neuropathy, CKD3b, chronic neuropathic pain. She was seen in the ER 6/21 at 8 PM after a near syncopal episode at home while hypoglycemic.  CT the head was unremarkable so she was discharged home.  She returned to the ER again yesterday evening planing of bilateral leg weakness, mild generalized confusion but no focal neurologic deficits.  She describes being unable to stand up out of her recliner urinating herself and unable to ambulate with her walker as usual.  She complains of pain in her left knee from her fall the night before but imaging in the ER was without fracture.  She denies fever, chills, syncopal episode, neurologic deficits, hypoglycemia, nausea vomiting, chest pain or shortness of breath.  He was recently admitted to this facility in May with generalized pain suspected to be exacerbated by anxiety with unremarkable work-up.         History of Present Illness  Review of Systems   Constitutional: Positive for activity change and fatigue. Negative for appetite change, chills, diaphoresis and unexpected weight change.   HENT: Negative for congestion and trouble swallowing.    Respiratory: Negative for cough, choking, chest tightness and shortness of breath.    Cardiovascular: Negative for chest pain, palpitations and leg swelling.   Gastrointestinal: Negative for abdominal distention, abdominal pain, blood in stool, constipation, diarrhea, nausea and vomiting.   Genitourinary: Negative  for difficulty urinating, dysuria and urgency.   Musculoskeletal: Negative for back pain.        Chronic pain   Skin: Negative for color change.   Neurological: Positive for weakness. Negative for dizziness, speech difficulty and numbness.   Hematological: Does not bruise/bleed easily.   Psychiatric/Behavioral: Negative.  Negative for confusion.        Personal History     Past Medical History:   Diagnosis Date   • Anxiety    • Arthritis    • Benign essential hypertension 08/20/2014   • 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HCC)    • Depression    • Diabetes (HCC)    • Diabetes mellitus, type 2 (HCC)    • Disc degeneration, lumbar    • Headache, tension-type    • Hyperlipidemia    • Hypothyroidism    • Neuropathy    • Osteoporosis 09/09/2015   • Peripheral neuropathy    • Rotator cuff tear, left    • Scoliosis    • Shoulder pain     LEFT, TORN ROTATOR CUFF S/P FALL   • Spinal stenosis      Past Surgical History:   Procedure Laterality Date   • APPENDECTOMY     • BILATERAL BREAST REDUCTION Bilateral 08/2015   • CATARACT EXTRACTION  03/2015   • CHOLECYSTECTOMY WITH INTRAOPERATIVE CHOLANGIOGRAM N/A 3/27/2022    Procedure: CHOLECYSTECTOMY LAPAROSCOPIC INTRAOPERATIVE CHOLANGIOGRAM;  Surgeon: Aiyana Hill MD;  Location: [facility] MAIN OR;  Service: General;  Laterality: N/A;   • COLONOSCOPY  06/05/2015    WNL   • ERCP N/A 2/25/2022    Procedure: ENDOSCOPIC RETROGRADE CHOLANGIOPANCREATOGRAPHY with sphincterotomy and balloon sweep;  Surgeon: Chilo Wilhelm MD;  Location: [facility] ENDOSCOPY;  Service: Gastroenterology;  Laterality: N/A;  PRE/POST - CBD stones   • ERCP N/A 3/28/2022    Procedure: ENDOSCOPIC RETROGRADE CHOLANGIOPANCREATOGRAPHY WITH SPHINCTEROTOMY AND BALLOON SWEEP;  Surgeon: Chilo Wilhelm MD;  Location: [facility] ENDOSCOPY;  Service: Gastroenterology;  Laterality: N/A;  PRE: COMMON DUCT STONE  POST: COMMON DUCT STONE   • KYPHOPLASTY     • REDUCTION MAMMAPLASTY     • TONSILLECTOMY       Family History   Problem Relation  Age of Onset   • Bone cancer Mother    • No Known Problems Father      Social History     Tobacco Use   • Smoking status: Former Smoker     Packs/day: 1.00     Quit date:      Years since quittin.4   • Smokeless tobacco: Never Used   Vaping Use   • Vaping Use: Never used   Substance Use Topics   • Alcohol use: No   • Drug use: No     No current facility-administered medications on file prior to encounter.     Current Outpatient Medications on File Prior to Encounter   Medication Sig Dispense Refill   • acetaminophen (TYLENOL) 325 MG tablet Take 2 tablets by mouth Every 6 (Six) Hours As Needed for Mild Pain .     • atorvastatin (LIPITOR) 80 MG tablet TAKE 1 TABLET EVERY NIGHT 90 tablet 3   • bisoprolol (ZEBeta) 5 MG tablet Take 0.5 tablets by mouth Daily. 90 tablet 3   • Dulaglutide (Trulicity) 1.5 MG/0.5ML solution pen-injector Inject 1.5 mg under the skin into the appropriate area as directed 1 (One) Time Per Week. 12 pen 2   • DULoxetine (CYMBALTA) 30 MG capsule Take 1 capsule by mouth Daily. 90 capsule 1   • famotidine (PEPCID) 20 MG tablet Take 1 tablet by mouth Daily Before Supper.     • hydroCHLOROthiazide (MICROZIDE) 12.5 MG capsule Take 12.5 mg by mouth Daily.     • HYDROcodone-acetaminophen (NORCO) 5-325 MG per tablet Take 1 tablet by mouth Every 6 (Six) Hours As Needed for Moderate Pain . 15 tablet 0   • hydrocortisone-zinc oxide-bacitracin-nystatin cream Apply 1 application topically to the appropriate area as directed 2 (Two) Times a Day As Needed (rash). 5 g 0   • Insulin Lispro, 1 Unit Dial, (HUMALOG) 100 UNIT/ML solution pen-injector Inject 10 Units under the skin into the appropriate area as directed 3 (Three) Times a Day With Meals. 15 pen 3   • Insulin Pen Needle 32G X 4 MM misc Use to inject insulin 4 TIMES DAILY 200 each 0   • levothyroxine (SYNTHROID, LEVOTHROID) 112 MCG tablet Take 1 tablet by mouth Daily. 90 tablet 1   • lisinopril (PRINIVIL,ZESTRIL) 20 MG tablet Take 1 tablet by mouth  Daily for 30 days. (Patient taking differently: Take 5 mg by mouth Daily.) 30 tablet 0   • pregabalin (LYRICA) 50 MG capsule Take 1 capsule by mouth 3 (Three) Times a Day. 270 capsule 1   • Toujeo SoloStar 300 UNIT/ML solution pen-injector injection INJECT 60 UNITS UNDER THE SKIN INTO THE APPROPRIATE AREA AS DIRECTED EVERY NIGHT AT BEDTIME 27 mL 3     Allergies   Allergen Reactions   • Sulfa Antibiotics Unknown - High Severity     Pt says it was a long time ago and I don't remember but it wasn't good.        Objective    Objective     Vital Signs  Temp:  [97.1 °F (36.2 °C)] 97.1 °F (36.2 °C)  Heart Rate:  [60-79] 71  Resp:  [14] 14  BP: (140-217)/() 183/53  SpO2:  [90 %-98 %] 96 %  on   ;   Device (Oxygen Therapy): room air  Body mass index is 28.59 kg/m².    Physical Exam  Vitals and nursing note reviewed.   Constitutional:       Appearance: She is well-developed.      Comments: Elderly frail female anxious   HENT:      Head: Normocephalic and atraumatic.      Mouth/Throat:      Mouth: Mucous membranes are moist.   Eyes:      Extraocular Movements: Extraocular movements intact.      Conjunctiva/sclera: Conjunctivae normal.   Cardiovascular:      Rate and Rhythm: Normal rate and regular rhythm.      Heart sounds: Normal heart sounds.   Pulmonary:      Effort: Pulmonary effort is normal.      Breath sounds: Normal breath sounds.   Abdominal:      General: Bowel sounds are normal. There is no distension or abdominal bruit.      Palpations: Abdomen is soft. Abdomen is not rigid. There is no shifting dullness, fluid wave or pulsatile mass.      Tenderness: There is no abdominal tenderness. There is no guarding.   Musculoskeletal:         General: Normal range of motion.      Cervical back: Normal range of motion.      Right lower leg: No edema.      Left lower leg: No edema.   Skin:     General: Skin is warm and dry.   Neurological:      Mental Status: She is alert and oriented to person, place, and time.    Psychiatric:         Thought Content: Thought content normal.      Comments: anxious         Results Review:  I reviewed the patient's new clinical results.  I reviewed the patient's new imaging results and agree with the interpretation.  I reviewed the patient's other test results and agree with the interpretation  I personally viewed and interpreted the patient's EKG/Telemetry data  Discussed with ED provider.    Lab Results (last 24 hours)     Procedure Component Value Units Date/Time    POC Glucose Once [[id number]]  (Normal) Collected: 06/22/22 2135    Specimen: Blood Updated: 06/22/22 2137     Glucose 124 mg/dL      Comment: Meter: [id number] : [id number] Ayoub Maffie ERT       CBC & Differential [[id number]]  (Abnormal) Collected: 06/22/22 2140    Specimen: Blood Updated: 06/22/22 2200    Narrative:      The following orders were created for panel order CBC & Differential.  Procedure                               Abnormality         Status                     ---------                               -----------         ------                     CBC Auto Differential[[id number]]        Abnormal            Final result                 Please view results for these tests on the individual orders.    Comprehensive Metabolic Panel [[id number]]  (Abnormal) Collected: 06/22/22 2140    Specimen: Blood Updated: 06/22/22 2210     Glucose 126 mg/dL      BUN 23 mg/dL      Creatinine 1.16 mg/dL      Sodium 139 mmol/L      Potassium 4.2 mmol/L      Chloride 102 mmol/L      CO2 24.0 mmol/L      Calcium 9.2 mg/dL      Total Protein 7.2 g/dL      Albumin 3.80 g/dL      ALT (SGPT) 9 U/L      AST (SGOT) 12 U/L      Alkaline Phosphatase 100 U/L      Total Bilirubin 0.6 mg/dL      Globulin 3.4 gm/dL      A/G Ratio 1.1 g/dL      BUN/Creatinine Ratio 19.8     Anion Gap 13.0 mmol/L      eGFR 47.2 mL/min/1.73      Comment: National Kidney Foundation and American Society of Nephrology (ASN) Task Force recommended calculation  based on the Chronic Kidney Disease Epidemiology Collaboration (CKD-EPI) equation refit without adjustment for race.       Narrative:      GFR Normal >60  Chronic Kidney Disease <60  Kidney Failure <15      CBC Auto Differential [[id number]]  (Abnormal) Collected: 06/22/22 2140    Specimen: Blood Updated: 06/22/22 2200     WBC 11.52 10*3/mm3      RBC 4.21 10*6/mm3      Hemoglobin 12.1 g/dL      Hematocrit 36.2 %      MCV 86.0 fL      MCH 28.7 pg      MCHC 33.4 g/dL      RDW 13.7 %      RDW-SD 41.5 fl      MPV 9.5 fL      Platelets 250 10*3/mm3      Neutrophil % 73.6 %      Lymphocyte % 13.2 %      Monocyte % 9.4 %      Eosinophil % 2.4 %      Basophil % 0.6 %      Neutrophils, Absolute 8.48 10*3/mm3      Lymphocytes, Absolute 1.52 10*3/mm3      Monocytes, Absolute 1.08 10*3/mm3      Eosinophils, Absolute 0.28 10*3/mm3      Basophils, Absolute 0.07 10*3/mm3     Urinalysis With Microscopic If Indicated (No Culture) - Urine, Catheter [[id number]]  (Abnormal) Collected: 06/22/22 2156    Specimen: Urine, Catheter Updated: 06/22/22 2223     Color, UA Yellow     Appearance, UA Clear     pH, UA 5.5     Specific Gravity, UA 1.022     Glucose, UA Negative     Ketones, UA Trace     Bilirubin, UA Negative     Blood, UA Negative     Protein, UA >=300 mg/dL (3+)     Leuk Esterase, UA Negative     Nitrite, UA Negative     Urobilinogen, UA 0.2 E.U./dL    Urinalysis, Microscopic Only - Urine, Catheter [[id number]] Collected: 06/22/22 2156    Specimen: Urine, Catheter Updated: 06/22/22 2223     RBC, UA 0-2 /HPF      WBC, UA 0-2 /HPF      Bacteria, UA None Seen /HPF      Squamous Epithelial Cells, UA 0-2 /HPF      Hyaline Casts, UA 7-12 /LPF      Methodology Automated Microscopy    COVID PRE-OP / PRE-PROCEDURE SCREENING ORDER (NO ISOLATION) - Swab, Nasopharynx [[id number]]  (Normal) Collected: 06/22/22 2255    Specimen: Swab from Nasopharynx Updated: 06/22/22 9348    Narrative:      The following orders were created for panel order COVID  PRE-OP / PRE-PROCEDURE SCREENING ORDER (NO ISOLATION) - Swab, Nasopharynx.  Procedure                               Abnormality         Status                     ---------                               -----------         ------                     COVID-19,[facility] NAY IN-HOUSE...[[id number]]  Normal              Final result                 Please view results for these tests on the individual orders.    COVID-19,[facility] NAY IN-HOUSE CEPHEID/HATTIE NP SWAB IN TRANSPORT MEDIA 8-12 HR TAT - Swab, Nasopharynx [[id number]]  (Normal) Collected: 06/22/22 2255    Specimen: Swab from Nasopharynx Updated: 06/22/22 2348     COVID19 Not Detected    Narrative:      Fact sheet for providers: https://www.fda.gov/media/146738/download     Fact sheet for patients: https://www.fda.gov/media/513373/download    CBC (No Diff) [[id number]]  (Abnormal) Collected: 06/23/22 0425    Specimen: Blood Updated: 06/23/22 0446     WBC 9.94 10*3/mm3      RBC 3.75 10*6/mm3      Hemoglobin 10.9 g/dL      Hematocrit 32.6 %      MCV 86.9 fL      MCH 29.1 pg      MCHC 33.4 g/dL      RDW 13.5 %      RDW-SD 41.8 fl      MPV 9.4 fL      Platelets 253 10*3/mm3     Comprehensive Metabolic Panel [[id number]]  (Abnormal) Collected: 06/23/22 0425    Specimen: Blood Updated: 06/23/22 0509     Glucose 234 mg/dL      BUN 23 mg/dL      Creatinine 1.05 mg/dL      Sodium 138 mmol/L      Potassium 3.9 mmol/L      Chloride 103 mmol/L      CO2 23.0 mmol/L      Calcium 8.9 mg/dL      Total Protein 6.6 g/dL      Albumin 3.60 g/dL      ALT (SGPT) 7 U/L      AST (SGOT) 12 U/L      Alkaline Phosphatase 88 U/L      Total Bilirubin 0.5 mg/dL      Globulin 3.0 gm/dL      A/G Ratio 1.2 g/dL      BUN/Creatinine Ratio 21.9     Anion Gap 12.0 mmol/L      eGFR 53.2 mL/min/1.73      Comment: National Kidney Foundation and American Society of Nephrology (ASN) Task Force recommended calculation based on the Chronic Kidney Disease Epidemiology Collaboration (CKD-EPI) equation refit without  adjustment for race.       Narrative:      GFR Normal >60  Chronic Kidney Disease <60  Kidney Failure <15      Hemoglobin A1c [[id number]]  (Abnormal) Collected: 06/23/22 0425    Specimen: Blood Updated: 06/23/22 0506     Hemoglobin A1C 7.60 %     Narrative:      Hemoglobin A1C Ranges:    Increased Risk for Diabetes  5.7% to 6.4%  Diabetes                     >= 6.5%  Diabetic Goal                < 7.0%    Lipid Panel [[id number]]  (Abnormal) Collected: 06/23/22 0425    Specimen: Blood Updated: 06/23/22 0509     Total Cholesterol 120 mg/dL      Triglycerides 127 mg/dL      HDL Cholesterol 34 mg/dL      LDL Cholesterol  63 mg/dL      VLDL Cholesterol 23 mg/dL      LDL/HDL Ratio 1.78    Narrative:      Cholesterol Reference Ranges  (U.S. Department of Health and Human Services ATP III Classifications)    Desirable          <200 mg/dL  Borderline High    200-239 mg/dL  High Risk          >240 mg/dL      Triglyceride Reference Ranges  (U.S. Department of Health and Human Services ATP III Classifications)    Normal           <150 mg/dL  Borderline High  150-199 mg/dL  High             200-499 mg/dL  Very High        >500 mg/dL    HDL Reference Ranges  (U.S. Department of Health and Human Services ATP III Classifications)    Low     <40 mg/dl (major risk factor for CHD)  High    >60 mg/dl ('negative' risk factor for CHD)        LDL Reference Ranges  (U.S. Department of Health and Human Services ATP III Classifications)    Optimal          <100 mg/dL  Near Optimal     100-129 mg/dL  Borderline High  130-159 mg/dL  High             160-189 mg/dL  Very High        >189 mg/dL    TSH [[id number]]  (Normal) Collected: 06/23/22 0425    Specimen: Blood Updated: 06/23/22 0513     TSH 3.870 uIU/mL     Hemoglobin A1c [[id number]]  (Abnormal) Collected: 06/23/22 0425    Specimen: Blood Updated: 06/23/22 1223     Hemoglobin A1C 7.60 %     Narrative:      Hemoglobin A1C Ranges:    Increased Risk for Diabetes  5.7% to 6.4%  Diabetes                      >= 6.5%  Diabetic Goal                < 7.0%    POC Glucose Once [[id number]]  (Abnormal) Collected: 06/23/22 0633    Specimen: Blood Updated: 06/23/22 0635     Glucose 204 mg/dL      Comment: Meter: [id number] : [id number] Ayoub Hernestofie ERT       POC Glucose Once [[id number]]  (Abnormal) Collected: 06/23/22 1031    Specimen: Blood Updated: 06/23/22 1032     Glucose 179 mg/dL      Comment: Meter: [id number] : [id number] Frank HERNANDEZ             Imaging Results (Last 24 Hours)     Procedure Component Value Units Date/Time    CT Head Without Contrast [[id number]] Collected: 06/22/22 2312     Updated: 06/22/22 2312    Narrative:        Patient: TABATHA CARTER  Time Out: 23:11  Exam(s): CT HEAD Without Contrast     EXAM:    CT Head Without Intravenous Contrast    CLINICAL HISTORY:     Reason for exam: ams, unable to ambulate.    TECHNIQUE:    Axial computed tomography images of the head brain without intravenous   contrast.  CTDI is 54.8 mGy and DLP is 1007.3 mGy-cm.  This CT exam was   performed according to the principle of ALARA (As Low As Reasonably   Achievable) by using one or more of the following dose reduction   techniques: automated exposure control, adjustment of the mA and or kV   according to patient size, and or use of iterative reconstruction   technique.    COMPARISON:    6 21 2022.    FINDINGS:    Brain:  Chronic lacunar infarcts basal ganglia bilaterally.  No   abnormal extra-axial collection is noted.  No hemorrhage.    Midline shift:  Midline anatomy is unremarkable.    Ventricles:  There is prominence of the ventricular system, cortical   sulci, basilar cisterns, compatible with age related atrophy.    Bones joints:  Calvarium is within normal limits.  No acute fracture.    Soft tissues:  Unremarkable.    Sinuses:  Visualized sinuses are unremarkable.    Mastoid air cells:  Mastoid air cells are well pneumatized.    IMPRESSION:       1.  Age-related atrophy and small  vessel disease of aging.  2.  Chronic lacunar infarcts.  3.  No acute intracranial pathology.  4.  If there is concern for etiology such as early acute lacunar infarcts,   MRI imaging of the brain with diffusion-weighted sequences should be   performed.      Impression:          Electronically signed by Vadim Galaviz MD on 06-22-22 at 2311          Results for orders placed during the hospital encounter of 12/01/21    Adult Transthoracic Echo Complete W/ Cont if Necessary Per Protocol    Interpretation Summary  · Left ventricular ejection fraction appears to be 66 - 70%. Left ventricular systolic function is normal.  · Left ventricular wall thickness is consistent with mild to moderate concentric hypertrophy.  · Left ventricular diastolic function is consistent with (grade I) impaired relaxation.  · Normal right ventricular cavity size and systolic function noted.  · The left atrial cavity is mild to moderately dilated.  · There is a trivial pericardial effusion.      No orders to display        Assessment/Plan     Active Hospital Problems    Diagnosis  POA   • **Hypoglycemia [E16.2]  Yes   • Acute metabolic encephalopathy [G93.41]  Yes   • Anxiety disorder [F41.9]  Yes   • Neuropathic pain [M79.2]  Yes   • Noncompliance with medication regimen [Z91.14]  Not Applicable   • Type 2 diabetes mellitus, with long-term current use of insulin (HCC) [E11.9, Z79.4]  Not Applicable   • Primary hypothyroidism [E03.9]  Yes   • Benign essential hypertension [I10]  Yes   • Stage 3b chronic kidney disease (HCC) [N18.32]  Yes      Resolved Hospital Problems   No resolved problems to display.       Ms. Guidry is a 82 y.o. admitted with bilateral lower extremity weakness and generalized weakness a near syncopal episode 2 nights ago.  Labs unremarkable.WBC slightly elevated on admission now resolved without antibiotics.  Afebrile. CT head negative and bilateral knee xray negative 6/22    · Metabolic encephalopathy/ Near  syncope  Encephalopathy resolved. Workup unrevealing. Echocardiogram pending but doubt cardiac etiology. MRI brain ordered with neurology consult pending. Suspect near syncope was likely related to hypoglycemia.  She states her glucose has been dropping to the 60s and 80s and averaging around 150.  A1c 7.6 is tightly controlled for her age.  Await work-up and allow for higher glucose levels. She takes her meds as prescribed now. PT OT consult    · Hypoglycemia/DM2  She is on high doses of scheduled mealtime (lispro 10u)and Lantus nightly (60u) and Trulicity. Start correctional insulin. Mealtime insulin at 5u and lantus 15u at night. Monitor trends to determine insulin regimen needs    · HTN- home meds reviewed and resumed. Monitor BP.     · PPX1g-Yoh at baseline    · Anxiety - continue duloxetine   · Hypothyroidism- TSH acceptable.   · Chronic pain -Continue lyrica low dose and low dose norco  · Hx of noncompliance with medications.  She reports being compliant since last admission.  · I discussed the patient's findings and my recommendations with patient, nursing staff and ED provider Dr Rodriguez.    VTE Prophylaxis - SCDs.  Code Status - Full code.       SERGE Hays  [facility]  06/23/22  12:43 EDT

## 2022-06-23 NOTE — PLAN OF CARE
Pt. Slightly hypertensive at times, improved with PO and IV Hypertension meds. VS otherwise wnl.  C/o headache this a.m. relieved by PO pain meds.  Pt. A&O x4, this a.m. confused this afternoon s/p Ativan, improving throughout evening.  Pt. With PU to coccyx upon admission, Q2turn.  Will place SCDs when available.  Pt. With Purewick catheter, adequate UOP.  Pt. Had MRI, see results.  Pt. Resting comfortably at present, will continue to monitor closely.      Problem: Adult Inpatient Plan of Care  Goal: Plan of Care Review  Outcome: Ongoing, Progressing  Flowsheets (Taken 6/23/2022 1924)  Progress: improving  Plan of Care Reviewed With: patient

## 2022-06-23 NOTE — THERAPY EVALUATION
Patient Name: Halie Guidry  : 1940    MRN: [id number]                              Today's Date: 2022       Admit Date: 2022    Visit Dx:     ICD-10-CM ICD-9-CM   1. Acute metabolic encephalopathy  G93.41 348.31   2. Weakness of both lower extremities  R29.898 729.89     Patient Active Problem List   Diagnosis   • Compression fracture   • Lumbar degenerative disc disease   • Type 2 diabetes mellitus, with long-term current use of insulin ([facility])   • Hyperlipidemia   • Benign essential hypertension   • Primary hypothyroidism   • Leukocytosis   • Neuropathy   • Osteoporosis   • Proteinuria   • Tobacco abuse   • Diabetic eye exam ([facility])   • Generalized weakness   • Spinal stenosis   • Scoliosis   • Arthritis   • VBI (vertebrobasilar insufficiency)   • Orthostatic hypotension   • 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 ([facility])   • Severe hypothyroidism   • Noncompliance with medication regimen   • Vitamin D deficiency disease   • Altered mental status   • Tremor   • Seizure ([facility])   • Stage 3b chronic kidney disease ([facility])   • Thoracic degenerative disc disease   • Metabolic encephalopathy   • VIPUL (acute kidney injury) ([facility])   • Hyperglycemia   • Type II diabetes mellitus, uncontrolled   • Lower abdominal pain   • Transaminitis   • COVID-19 virus detected   • UTI (urinary tract infection)   • Emphysematous cystitis   • Choledocholithiasis   • Hypokalemia   • Hypoxia   • Generalized abdominal pain   • History of Clostridium difficile infection   • History of ERCP   • Acute UTI (urinary tract infection)   • Hypomagnesemia   • Burning pain   • Anxiety disorder   • Neuropathic pain   • Intertrigo   • Acute metabolic encephalopathy   • Hypoglycemia     Past Medical History:   Diagnosis Date   • Anxiety    • Arthritis    • Benign essential hypertension 2014   • 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[facility])    • Depression    • Diabetes ([facility])    • Diabetes mellitus, type 2 ([facility])    • Disc degeneration, lumbar     • Headache, tension-type    • Hyperlipidemia    • Hypothyroidism    • Neuropathy    • Osteoporosis 09/09/2015   • Peripheral neuropathy    • Rotator cuff tear, left    • Scoliosis    • Shoulder pain     LEFT, TORN ROTATOR CUFF S/P FALL   • Spinal stenosis      Past Surgical History:   Procedure Laterality Date   • APPENDECTOMY     • BILATERAL BREAST REDUCTION Bilateral 08/2015   • CATARACT EXTRACTION  03/2015   • CHOLECYSTECTOMY WITH INTRAOPERATIVE CHOLANGIOGRAM N/A 3/27/2022    Procedure: CHOLECYSTECTOMY LAPAROSCOPIC INTRAOPERATIVE CHOLANGIOGRAM;  Surgeon: Aiyana Hill MD;  Location: [facility] OR;  Service: General;  Laterality: N/A;   • COLONOSCOPY  06/05/2015    WNL   • ERCP N/A 2/25/2022    Procedure: ENDOSCOPIC RETROGRADE CHOLANGIOPANCREATOGRAPHY with sphincterotomy and balloon sweep;  Surgeon: Chilo Wilhelm MD;  Location: [facility] ENDOSCOPY;  Service: Gastroenterology;  Laterality: N/A;  PRE/POST - CBD stones   • ERCP N/A 3/28/2022    Procedure: ENDOSCOPIC RETROGRADE CHOLANGIOPANCREATOGRAPHY WITH SPHINCTEROTOMY AND BALLOON SWEEP;  Surgeon: Chilo Wilhelm MD;  Location: [facility] ENDOSCOPY;  Service: Gastroenterology;  Laterality: N/A;  PRE: COMMON DUCT STONE  POST: COMMON DUCT STONE   • KYPHOPLASTY     • REDUCTION MAMMAPLASTY     • TONSILLECTOMY        General Information     Row Name 06/23/22 1210          OT Time and Intention    Document Type evaluation  -VS     Mode of Treatment occupational therapy  -VS     Row Name 06/23/22 1210          General Information    Patient Profile Reviewed yes  -VS     Prior Level of Function independent:;ADL's  per the pt. she uses a walker  -VS     Existing Precautions/Restrictions fall  -VS     Barriers to Rehab medically complex  -VS     Row Name 06/23/22 1210          Living Environment    People in Home child(beatriz), adult  per pt. her SOn drives her to her appointment and shopping but they both (I) cook their meals, she also stated she would not get into  the shower unless he is home she has a fear of falling  -VS     Row Name 06/23/22 1210          Cognition    Orientation Status (Cognition) oriented x 3  -VS     [facility] Name 06/23/22 1210          Safety Issues, Functional Mobility    Safety Issues Affecting Function (Mobility) ability to follow commands  at times is anxious and needs extra time to process verbal directions  -VS     Impairments Affecting Function (Mobility) balance;endurance/activity tolerance;range of motion (ROM);strength;pain;motor control  -VS     Comment, Safety Issues/Impairments (Mobility) Pt. has a fear of falling whihc makes moving a challenge for her.  -VS           User Key  (r) = Recorded By, (t) = Taken By, (c) = Cosigned By    Initials Name Provider Type    VS Geraldine Almonte OTR Occupational Therapist                 Mobility/ADL's     Row Name 06/23/22 1214          Bed Mobility    Bed Mobility supine-sit-supine;scooting/bridging;rolling right  -VS     Rolling Right [place] (Bed Mobility) moderate assist (50% patient effort)  -VS     Scooting/Bridging [place] (Bed Mobility) dependent (less than 25% patient effort);2 person assist  -VS     Sit-Supine [place] (Bed Mobility) moderate assist (50% patient effort)  -VS     Supine-Sit-Supine [place] (Bed Mobility) verbal cues  -VS     Comment, (Bed Mobility) increased time to complete and fear of falling was a issue for the pt.  -VS     Row Name 06/23/22 1214          Transfers    Sit-Stand [place] (Transfers) dependent (less than 25% patient effort)  -VS     Row Name 06/23/22 1214          Sit-Stand Transfer    Comment, (Sit-Stand Transfer) Atttempted x 2 from EOB, pt. with assistance was not able to lift her buttovks off the bed, pt. had a fear of falling which made this task harder to attempt  -VS     [facility] Name 06/23/22 1214          Activities of Daily Living    BADL Assessment/Intervention lower body dressing;feeding  -VS     Row Name 06/23/22 1214          Lower  Body Dressing Assessment/Training    [place] Level (Lower Body Dressing) lower body dressing skills;don;socks;dependent (less than 25% patient effort)  -VS     Position (Lower Body Dressing) long sitting  -VS     Row Name 06/23/22 1214          Self-Feeding Assessment/Training    [place] Level (Feeding) feeding skills;independent  -VS     Position (Self-Feeding) sitting up in bed  -VS           User Key  (r) = Recorded By, (t) = Taken By, (c) = Cosigned By    Initials Name Provider Type    VS Geraldine Almonte OTR Occupational Therapist               Obj/Interventions     Row Name 06/23/22 1217          Range of Motion Comprehensive    General Range of Motion upper extremity range of motion deficits identified  -VS     Comment, General Range of Motion (R)UE AROM WFLs, (L)UE per pt. rotator cuff issues since 2019, AROM limited with all range of motion aspects: shoulder 1/4, elbow 3/4, supination 1/2, finger/thumb WFLs. PROM:  shoulder 1/2 [place] with pain at the end of the range  -VS     Row Name 06/23/22 1217          Strength Comprehensive (MMT)    General Manual Muscle Testing (MMT) Assessment upper extremity strength deficits identified  -VS     Comment, General Manual Muscle Testing (MMT) Assessment Weakness noted with (B)UE, formal testing unable to be tested  -VS     Row Name 06/23/22 1217          Motor Skills    Motor Skills functional endurance  -VS     Functional Endurance Poor +  -VS     Row Name 06/23/22 1217          Balance    Balance Assessment sitting static balance  -VS     Static Sitting Balance standby assist  -VS     Position, Sitting Balance sitting edge of bed  -VS     Balance Interventions occupation based/functional task  -VS           User Key  (r) = Recorded By, (t) = Taken By, (c) = Cosigned By    Initials Name Provider Type    VS Geraldine Almonte OTR Occupational Therapist               Goals/Plan     Row Name 06/23/22 1232          Bed Mobility Goal 1 (OT)    Activity/Assistive  Device (Bed Mobility Goal 1, OT) bed mobility activities, all  -VS     [place] Level/Cues Needed (Bed Mobility Goal 1, OT) minimum assist (75% or more patient effort)  -VS     Time Frame (Bed Mobility Goal 1, OT) short term goal (STG);2 weeks  -VS     Progress/Outcomes (Bed Mobility Goal 1, OT) continuing progress toward goal  -VS     Row Name 06/23/22 1232          Transfer Goal 1 (OT)    Activity/Assistive Device (Transfer Goal 1, OT) sit-to-stand/stand-to-sit;bed-to-chair/chair-to-bed  -VS     [place] Level/Cues Needed (Transfer Goal 1, OT) minimum assist (75% or more patient effort);moderate assist (50-74% patient effort)  -VS     Time Frame (Transfer Goal 1, OT) short term goal (STG);2 weeks  -VS     Progress/Outcome (Transfer Goal 1, OT) continuing progress toward goal  -VS     Row Name 06/23/22 1232          Bathing Goal 1 (OT)    Activity/Device (Bathing Goal 1, OT) upper body bathing  -VS     [place] Level/Cues Needed (Bathing Goal 1, OT) minimum assist (75% or more patient effort)  -VS     Time Frame (Bathing Goal 1, OT) short term goal (STG);2 weeks  -VS     Progress/Outcomes (Bathing Goal 1, OT) continuing progress toward goal  -VS     Row Name 06/23/22 1232          Problem Specific Goal 1 (OT)    Problem Specific Goal 1 (OT) Pt's endurance with functional ADL task, sitting and standing task to be fair - to (A) with safety with BADLS  -VS     Time Frame (Problem Specific Goal 1, OT) short term goal (STG);2 weeks  -VS     Progress/Outcome (Problem Specific Goal 1, OT) continuing progress toward goal  -VS     Row Name 06/23/22 1232          Therapy Assessment/Plan (OT)    Planned Therapy Interventions (OT) activity tolerance training;BADL retraining;functional balance retraining;neuromuscular control/coordination retraining;patient/caregiver education/training;transfer/mobility retraining;strengthening exercise  -VS           User Key  (r) = Recorded By, (t) = Taken By, (c) = Cosigned By     "Initials Name Provider Type    VS Geraldine Almonte OTR Occupational Therapist               Clinical Impression     Row Name 06/23/22 1223          Pain Assessment    Pretreatment Pain Rating 4/10  -VS     Posttreatment Pain Rating 7/10  -VS     Pain Location - Side/Orientation Bilateral  -VS     Pain Location lower  -VS     Pain Location - extremity  -VS     Pre/Posttreatment Pain Comment Pt. asked to touch legs bery tequila hollinsue they both \"hurt\"  -VS     Row Name 06/23/22 1223          Plan of Care Review    Plan of Care Reviewed With patient  -VS     Outcome Evaluation OT:  Pt. is a 82 year old female was afmitte to the hospital with LE weakness.  Pt. fell at home 2 days ago came to the ER and was d/c back home, after being at home over night she stated \"my legs just stopped working, I can not walk\" pt. was brought back to the hospital yesterday.  Pt. is O x 3, she was (I) before her fall with a walker in the home, (I) with ADLs and cooking meals for herself.  Pt. presents today generalized weakness of (B)LEs and LUEs.  Pt. was dependant with LE dressing, bed mobility and was not able to complete a sit to stand transfer.   Pt. would benefit from skilled OT to address impriving BADLS, endurance and mobility.  Pt. would also benefit from a Sub Acute Rehab to maximize her strength and safety to return home.  OT wore a mask and eye protection, washed her hands before and afte the session.  Pt. wore a mask.  -VS     Row Name 06/23/22 1223          Therapy Assessment/Plan (OT)    Rehab Potential (OT) good, to achieve stated therapy goals  -VS     Criteria for Skilled Therapeutic Interventions Met (OT) yes;meets criteria  -VS     Therapy Frequency (OT) 5 times/wk  -VS     Row Name 06/23/22 1223          Therapy Plan Review/Discharge Plan (OT)    Anticipated Discharge Disposition (OT) sub acute care setting  -VS     Row Name 06/23/22 1223          Positioning and Restraints    Pre-Treatment Position in bed  -VS     " Post Treatment Position bed  -VS     In Bed call light within reach;encouraged to call for assist;exit alarm on;side rails up x3;heels elevated;sitting  -VS           User Key  (r) = Recorded By, (t) = Taken By, (c) = Cosigned By    Initials Name Provider Type    VS Geraldine Almonte OTR Occupational Therapist               Outcome Measures     Row Name 06/23/22 1234          How much help from another is currently needed...    Putting on and taking off regular lower body clothing? 1  -VS     Bathing (including washing, rinsing, and drying) 1  -VS     Toileting (which includes using toilet bed pan or urinal) 1  -VS     Putting on and taking off regular upper body clothing 2  -VS     Taking care of personal grooming (such as brushing teeth) 3  -VS     Eating meals 3  -VS     AM-PAC 6 Clicks Score (OT) 11  -VS     Row Name 06/23/22 1145          How much help from another person do you currently need...    Turning from your back to your side while in flat bed without using bedrails? 2  -EE     Moving from lying on back to sitting on the side of a flat bed without bedrails? 2  -EE     Moving to and from a bed to a chair (including a wheelchair)? 1  -EE     Standing up from a chair using your arms (e.g., wheelchair, bedside chair)? 1  -EE     Climbing 3-5 steps with a railing? 1  -EE     To walk in hospital room? 1  -EE     AM-PAC 6 Clicks Score (PT) 8  -EE     Highest level of mobility 3 --> Sat at edge of bed  -EE     Row Name 06/23/22 1145          Modified [place] Scale    Modified [place] Scale 4 - Moderately severe disability.  Unable to walk without assistance, and unable to attend to own bodily needs without assistance.  -EE     Row Name 06/23/22 1234 06/23/22 1145       Functional Assessment    Outcome Measure Options AM-PAC 6 Clicks Daily Activity (OT)  -VS AM-PAC 6 Clicks Basic Mobility (PT);Modified [place]  -EE          User Key  (r) = Recorded By, (t) = Taken By, (c) = Cosigned By    Initials Name Provider  "Type    VS Geraldine Almonte OTR Occupational Therapist    Vaishali Rg PT Physical Therapist                Occupational Therapy Education                 Title: PT OT SLP Therapies (In Progress)     Topic: Occupational Therapy (Done)     Point: ADL training (Done)     Description:   Instruct learner(s) on proper safety adaptation and remediation techniques during self care or transfers.   Instruct in proper use of assistive devices.              Learning Progress Summary           Patient Acceptance, E, VU,NR by VS at 6/23/2022 1235    Comment: OT discussed the goals and the POC with the pt.                   Point: Home exercise program (Done)     Description:   Instruct learner(s) on appropriate technique for monitoring, assisting and/or progressing therapeutic exercises/activities.              Learning Progress Summary           Patient Acceptance, E, VU,NR by VS at 6/23/2022 1235    Comment: OT discussed the goals and the POC with the pt.                               User Key     Initials Effective Dates Name Provider Type Discipline    VS 06/16/21 -  Geraldine Almonte OTR Occupational Therapist OT              TOMÁS Recommendation and Plan  Planned Therapy Interventions (OT): activity tolerance training, BADL retraining, functional balance retraining, neuromuscular control/coordination retraining, patient/caregiver education/training, transfer/mobility retraining, strengthening exercise  Therapy Frequency (OT): 5 times/wk  Plan of Care Review  Plan of Care Reviewed With: patient  Outcome Evaluation: OT:  Pt. is a 82 year old female was afmitte to the hospital with LE weakness.  Pt. fell at home 2 days ago came to the ER and was d/c back home, after being at home over night she stated \"my legs just stopped working, I can not walk\" pt. was brought back to the hospital yesterday.  Pt. is O x 3, she was (I) before her fall with a walker in the home, (I) with ADLs and cooking meals for herself.  Pt. presents " today generalized weakness of (B)LEs and LUEs.  Pt. was dependant with LE dressing, bed mobility and was not able to complete a sit to stand transfer.   Pt. would benefit from skilled OT to address impriving BADLS, endurance and mobility.  Pt. would also benefit from a Sub Acute Rehab to maximize her strength and safety to return home.  OT wore a mask and eye protection, washed her hands before and afte the session.  Pt. wore a mask.     Time Calculation:    Time Calculation- OT     Row Name 06/23/22 1236             Time Calculation- OT    OT Start Time 1059  -VS      OT Stop Time 1131  -VS      OT Time Calculation (min) 32 min  -VS      Total Timed Code Minutes- OT 10 minute(s)  -VS      OT Non-Billable Time (min) 20 min  -VS      OT Received On 06/23/22  -VS      OT - Next Appointment 06/24/22  -VS      OT Goal Re-Cert Due Date 07/01/22  -VS              Untimed Charges    OT Eval/Re-eval Minutes 10  -VS              Total Minutes    Untimed Charges Total Minutes 10  -VS       Total Minutes 10  -VS            User Key  (r) = Recorded By, (t) = Taken By, (c) = Cosigned By    Initials Name Provider Type    VS Geraldine Almonte OTR Occupational Therapist              Therapy Charges for Today     Code Description Service Date Service Provider Modifiers Qty    66576094836 HC OT EVAL LOW COMPLEXITY 2 6/23/2022 Geraldine Almonte OTR GO 1    [id number] HC OT NEUROMUSC RE EDUCATION EA 15 MIN 6/23/2022 Geraldine Almonte OTR GO 1               GREGG Dickens  6/23/2022

## 2022-06-23 NOTE — CASE MANAGEMENT/SOCIAL WORK
Discharge Planning Assessment  [facility]     Patient Name: Halie Guidry  MRN: [id number]  Today's Date: 6/23/2022    Admit Date: 6/22/2022     Discharge Needs Assessment     Row Name 06/23/22 1817       Living Environment    People in Home child(beatriz), adult    Name(s) of People in Home Derrick Guidry [phone]    Current Living Arrangements home    Primary Care Provided by self    Provides Primary Care For no one, unable/limited ability to care for self    Family Caregiver if Needed child(beatriz), adult    Family Caregiver Names Derrick Guidry    Quality of Family Relationships unable to assess    Able to Return to Prior Arrangements no    Living Arrangement Comments Pt will require a short term rehab stay with the goal of returning home       Resource/Environmental Concerns    Resource/Environmental Concerns none    Transportation Concerns none       Transition Planning    Patient/Family Anticipates Transition to inpatient rehabilitation facility    Patient/Family Anticipated Services at Transition   for rehab placement       Discharge Needs Assessment    Current Outpatient/Agency/Support Group skilled nursing facility  short term rehab stay    Equipment Currently Used at Home power chair,(recliner lift);walker, rolling    Concerns to be Addressed discharge planning    Concerns Comments Pt wants to go to short term rehab    Anticipated Changes Related to Illness inability to care for self    Equipment Needed After Discharge none    Outpatient/Agency/Support Group Needs other (see comments)  short term rehab    Discharge Facility/Level of Care Needs rehabilitation facility    Provided Post Acute Provider List? Yes    Post Acute Provider List Inpatient Rehab    Provided Post Acute Provider Quality & Resource List? Yes    Post Acute Provider Quality and Resource List Nursing Home    Delivered To Patient    Method of Delivery In person    Patient's Choice of Community Agency(s) Pt had 3  choices of facilities, referrals placed               Discharge Plan     Row Name 06/23/22 1822       Plan    Plan pt intends to go to rehab for a short term stay, with the goal of returning home. Referrals placed in epic    Patient/Family in Agreement with Plan yes    Provided Post Acute Provider List? Yes    Post Acute Provider List Inpatient Rehab    Provided Post Acute Provider Quality & Resource List? Yes    Post Acute Provider Quality and Resource List Inpatient Rehab    Delivered To Patient    Method of Delivery In person    Plan Comments Face sheet verified, role of CCP explained. Pt stated that she is unable to walk with her walker and is not able to do any ADL's. Pt lives with her son, Derrick, in a one story home with no steps to enter. Currently, pt uses a walker and no other assistive devices. Pt denies any difficulty paying for her medications. Referrals placed in [facility], [facility] book given to pt              Continued Care and Services - Admitted Since 6/22/2022    Coordination has not been started for this encounter.     Selected Continued Care - Episodes Includes selections from active Coordinated Care Management episodes    High Risk Care Management Episode start date: 6/22/2022   There are no active outsourced providers for this episode.                  Demographic Summary    No documentation.                Functional Status    No documentation.                Psychosocial    No documentation.                Abuse/Neglect    No documentation.                Legal    No documentation.                Substance Abuse    No documentation.                Patient Forms     Row Name 06/23/22 8223       Patient Forms    Provider Choice List Delivered  [facility]    Delivered to Patient    Method of delivery In person                  Vannessa Holt RN

## 2022-06-23 NOTE — PROGRESS NOTES
[facility] Acute Inpt Rehab Note     Referral received via stroke order set.  Please note this is a screening only, rehab admissions will not actively be evaluating this patient.  If felt patient is appropriate for our services once therapies begin, please call our office at [phone], to initiate a full referral.    Thank you,   Kelly Ray RN   Rehab Admission Nurse

## 2022-06-23 NOTE — THERAPY EVALUATION
Patient Name: Halie Guidry  : 1940    MRN: [id number]                              Today's Date: 2022       Admit Date: 2022    Visit Dx:     ICD-10-CM ICD-9-CM   1. Acute metabolic encephalopathy  G93.41 348.31   2. Weakness of both lower extremities  R29.898 729.89     Patient Active Problem List   Diagnosis   • Compression fracture   • Lumbar degenerative disc disease   • Type 2 diabetes mellitus, with long-term current use of insulin ([facility])   • Hyperlipidemia   • Benign essential hypertension   • Primary hypothyroidism   • Leukocytosis   • Neuropathy   • Osteoporosis   • Proteinuria   • Tobacco abuse   • Diabetic eye exam ([facility])   • Generalized weakness   • Spinal stenosis   • Scoliosis   • Arthritis   • VBI (vertebrobasilar insufficiency)   • Orthostatic hypotension   • 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 ([facility])   • Severe hypothyroidism   • Noncompliance with medication regimen   • Vitamin D deficiency disease   • Altered mental status   • Tremor   • Seizure ([facility])   • Stage 3b chronic kidney disease ([facility])   • Thoracic degenerative disc disease   • Metabolic encephalopathy   • VIPUL (acute kidney injury) ([facility])   • Hyperglycemia   • Type II diabetes mellitus, uncontrolled   • Lower abdominal pain   • Transaminitis   • COVID-19 virus detected   • UTI (urinary tract infection)   • Emphysematous cystitis   • Choledocholithiasis   • Hypokalemia   • Hypoxia   • Generalized abdominal pain   • History of Clostridium difficile infection   • History of ERCP   • Acute UTI (urinary tract infection)   • Hypomagnesemia   • Burning pain   • Anxiety disorder   • Neuropathic pain   • Intertrigo   • Acute metabolic encephalopathy     Past Medical History:   Diagnosis Date   • Anxiety    • Arthritis    • Benign essential hypertension 2014   • 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[facility])    • Depression    • Diabetes ([facility])    • Diabetes mellitus, type 2 ([facility])    • Disc degeneration, lumbar    • Headache,  tension-type    • Hyperlipidemia    • Hypothyroidism    • Neuropathy    • Osteoporosis 09/09/2015   • Peripheral neuropathy    • Rotator cuff tear, left    • Scoliosis    • Shoulder pain     LEFT, TORN ROTATOR CUFF S/P FALL   • Spinal stenosis      Past Surgical History:   Procedure Laterality Date   • APPENDECTOMY     • BILATERAL BREAST REDUCTION Bilateral 08/2015   • CATARACT EXTRACTION  03/2015   • CHOLECYSTECTOMY WITH INTRAOPERATIVE CHOLANGIOGRAM N/A 3/27/2022    Procedure: CHOLECYSTECTOMY LAPAROSCOPIC INTRAOPERATIVE CHOLANGIOGRAM;  Surgeon: Aiyana Hill MD;  Location: [facility] OR;  Service: General;  Laterality: N/A;   • COLONOSCOPY  06/05/2015    WNL   • ERCP N/A 2/25/2022    Procedure: ENDOSCOPIC RETROGRADE CHOLANGIOPANCREATOGRAPHY with sphincterotomy and balloon sweep;  Surgeon: Chilo Wilhelm MD;  Location: [facility] ENDOSCOPY;  Service: Gastroenterology;  Laterality: N/A;  PRE/POST - CBD stones   • ERCP N/A 3/28/2022    Procedure: ENDOSCOPIC RETROGRADE CHOLANGIOPANCREATOGRAPHY WITH SPHINCTEROTOMY AND BALLOON SWEEP;  Surgeon: Chilo Wilhelm MD;  Location: [facility] ENDOSCOPY;  Service: Gastroenterology;  Laterality: N/A;  PRE: COMMON DUCT STONE  POST: COMMON DUCT STONE   • KYPHOPLASTY     • REDUCTION MAMMAPLASTY     • TONSILLECTOMY        General Information     Row Name 06/23/22 1135          Physical Therapy Time and Intention    Document Type evaluation  -EE     Mode of Treatment physical therapy;co-treatment;occupational therapy  -EE     Row Name 06/23/22 1135          General Information    Prior Level of Function independent:;all household mobility;community mobility  uses rwx at home  -EE     Existing Precautions/Restrictions fall  -EE     Barriers to Rehab none identified  -EE     Row Name 06/23/22 1135          Living Environment    People in Home child(beatriz), adult  -EE     Row Name 06/23/22 1135          Home Main Entrance    Number of Stairs, Main Entrance none  -EE     Row Name  06/23/22 1135          Stairs Within Home, Primary    Number of Stairs, Within Home, Primary none  -EE     Row Name 06/23/22 1135          Cognition    Orientation Status (Cognition) oriented x 3  -EE     Row Name 06/23/22 1135          Safety Issues, Functional Mobility    Impairments Affecting Function (Mobility) balance;endurance/activity tolerance;range of motion (ROM);strength;pain;postural/trunk control  -EE           User Key  (r) = Recorded By, (t) = Taken By, (c) = Cosigned By    Initials Name Provider Type    EE Vaishali Short PT Physical Therapist               Mobility     Row Name 06/23/22 1136          Bed Mobility    Bed Mobility supine-sit;sit-supine;scooting/bridging  -EE     Scooting/Bridging [place] (Bed Mobility) dependent (less than 25% patient effort);2 person assist  -EE     Supine-Sit [place] (Bed Mobility) moderate assist (50% patient effort);verbal cues  -EE     Sit-Supine [place] (Bed Mobility) moderate assist (50% patient effort);verbal cues  -EE     Assistive Device (Bed Mobility) head of bed elevated;draw sheet;bed rails  -EE     Comment, (Bed Mobility) increased time required; assist required for moving LEs to EOB  -EE     Row Name 06/23/22 1136          Sit-Stand Transfer    Sit-Stand [place] (Transfers) dependent (less than 25% patient effort);2 person assist;other (see comments)  -EE     Comment, (Sit-Stand Transfer) Attempted STS x2 from EOB, once with rwx and once with HHA. Pt unable to clear buttocks from EOB; limited effort due to B LE pain and fear of falling.  -EE     Row Name 06/23/22 1136          Gait/Stairs (Locomotion)    [place] Level (Gait) unable to assess  -EE           User Key  (r) = Recorded By, (t) = Taken By, (c) = Cosigned By    Initials Name Provider Type    Vaishali Rg PT Physical Therapist               Obj/Interventions     Row Name 06/23/22 1138          Range of Motion Comprehensive    General Range of Motion lower extremity  range of motion deficits identified  -EE     Comment, General Range of Motion B ankle plantarflexion contracture noted, only able to DF to neutral with AAROM/overpressure from therapist. B knee/hip ROM appear grossly WFL for age.  -EE     Row Name 06/23/22 1138          Strength Comprehensive (MMT)    General Manual Muscle Testing (MMT) Assessment lower extremity strength deficits identified  -EE     Comment, General Manual Muscle Testing (MMT) Assessment Significant weakness bilaterally. Pt able to flex/extend toes but demos no active B ankle DF. B knee ext 2/5, B knee flexion 2+/5, B hip flexion 2+/5.  -EE     Row Name 06/23/22 1138          Balance    Balance Assessment sitting static balance  -EE     Static Sitting Balance standby assist  -EE     Position, Sitting Balance unsupported;sitting edge of bed  -EE     Row Name 06/23/22 1138          Sensory Assessment (Somatosensory)    Sensory Assessment (Somatosensory) not tested  -EE           User Key  (r) = Recorded By, (t) = Taken By, (c) = Cosigned By    Initials Name Provider Type    EE Vaishali Short, PT Physical Therapist               Goals/Plan     Row Name 06/23/22 1144          Bed Mobility Goal 1 (PT)    Activity/Assistive Device (Bed Mobility Goal 1, PT) sit to supine/supine to sit  -EE     [place] Level/Cues Needed (Bed Mobility Goal 1, PT) contact guard required  -EE     Time Frame (Bed Mobility Goal 1, PT) 1 week  -EE     Progress/Outcomes (Bed Mobility Goal 1, PT) goal ongoing  -EE     Row Name 06/23/22 1149          Transfer Goal 1 (PT)    Activity/Assistive Device (Transfer Goal 1, PT) sit-to-stand/stand-to-sit;bed-to-chair/chair-to-bed;walker, rolling  -EE     [place] Level/Cues Needed (Transfer Goal 1, PT) minimum assist (75% or more patient effort)  -EE     Time Frame (Transfer Goal 1, PT) 1 week  -EE     Progress/Outcome (Transfer Goal 1, PT) goal ongoing  -EE     Row Name 06/23/22 1144          Gait Training Goal 1 (PT)     "Activity/Assistive Device (Gait Training Goal 1, PT) gait (walking locomotion);walker, rolling  -EE     [place] Level (Gait Training Goal 1, PT) minimum assist (75% or more patient effort)  -EE     Distance (Gait Training Goal 1, PT) 25'  -EE     Time Frame (Gait Training Goal 1, PT) 1 week  -EE     Progress/Outcome (Gait Training Goal 1, PT) goal ongoing  -EE     Row Name 06/23/22 1144          Therapy Assessment/Plan (PT)    Planned Therapy Interventions (PT) bed mobility training;gait training;home exercise program;patient/family education;balance training;ROM (range of motion);stair training;strengthening;postural re-education;transfer training  -EE           User Key  (r) = Recorded By, (t) = Taken By, (c) = Cosigned By    Initials Name Provider Type    EE Vaishali Short, PT Physical Therapist               Clinical Impression     Row Name 06/23/22 7421          Pain    Pretreatment Pain Rating 4/10  -EE     Posttreatment Pain Rating 7/10  -EE     Pain Location - Side/Orientation Bilateral  -EE     Pain Location lower  -EE     Pain Location - extremity  -EE     Pre/Posttreatment Pain Comment pt reports B LEs are very sensitive to all touch/movement  -EE     Pain Intervention(s) Repositioned;Rest;Elevated  -EE     Row Name 06/23/22 6780          Plan of Care Review    Plan of Care Reviewed With patient  -EE     Outcome Evaluation Pt is an 81 yo female who presented from home with confusion, B LE weakness, reporting inability to get out of chair at home. Pt with recent fall and head injury 2/2 hypoglycemia, was seen in ED 6/21, head CT was (-) and pt was discharged home 6/22.  Pt now presents back stating her \"legs wouldn't work\" at home. Prior to admission, pt was living at home with son and independent with all mobility using rwx. Upon exam, pt demonstrates impaired B ankle ROM, B LE weakness, impaired balance, and decreased endurance limiting mobility. B LE erythema observed and pt reports B LEs are tender " and painful with movement. Pt required mod A with bed mobility and was unable to stand at EOB despite multiple attempts and assist x2. Pt would benefit from continued PT to address impairments and increase independence with functional mobility. Recommend DC to SNF for subacute rehab as pt is currently unsafe to return home.  -EE     Row Name 06/23/22 1139          Therapy Assessment/Plan (PT)    Rehab Potential (PT) good, to achieve stated therapy goals  -EE     Criteria for Skilled Interventions Met (PT) yes;meets criteria;skilled treatment is necessary  -EE     Therapy Frequency (PT) 5 times/wk  -EE     Row Name 06/23/22 1139          Positioning and Restraints    Pre-Treatment Position in bed  -EE     Post Treatment Position bed  -EE     In Bed fowlers;call light within reach;encouraged to call for assist;exit alarm on;notified nsg;heels elevated  -EE           User Key  (r) = Recorded By, (t) = Taken By, (c) = Cosigned By    Initials Name Provider Type    EE Vaishali Short, PT Physical Therapist               Outcome Measures     Row Name 06/23/22 0889          How much help from another person do you currently need...    Turning from your back to your side while in flat bed without using bedrails? 2  -EE     Moving from lying on back to sitting on the side of a flat bed without bedrails? 2  -EE     Moving to and from a bed to a chair (including a wheelchair)? 1  -EE     Standing up from a chair using your arms (e.g., wheelchair, bedside chair)? 1  -EE     Climbing 3-5 steps with a railing? 1  -EE     To walk in hospital room? 1  -EE     AM-PAC 6 Clicks Score (PT) 8  -EE     Highest level of mobility 3 --> Sat at edge of bed  -EE     Row Name 06/23/22 1148          Modified [place] Scale    Modified [place] Scale 4 - Moderately severe disability.  Unable to walk without assistance, and unable to attend to own bodily needs without assistance.  -EE     Row Name 06/23/22 1141          Functional Assessment    Outcome  "Measure Options AM-PAC 6 Clicks Basic Mobility (PT);Modified [place]  -EE           User Key  (r) = Recorded By, (t) = Taken By, (c) = Cosigned By    Initials Name Provider Type    EE Vaishali Short PT Physical Therapist                             Physical Therapy Education                 Title: PT OT SLP Therapies (In Progress)     Topic: Physical Therapy (In Progress)     Point: Mobility training (In Progress)     Learning Progress Summary           Patient Acceptance, E,TB, NR by  at 6/23/2022 1145                   Point: Home exercise program (Not Started)     Learner Progress:  Not documented in this visit.          Point: Body mechanics (Not Started)     Learner Progress:  Not documented in this visit.          Point: Precautions (Not Started)     Learner Progress:  Not documented in this visit.                      User Key     Initials Effective Dates Name Provider Type Discipline     06/16/21 -  Vaishali Short PT Physical Therapist PT              PT Recommendation and Plan  Planned Therapy Interventions (PT): bed mobility training, gait training, home exercise program, patient/family education, balance training, ROM (range of motion), stair training, strengthening, postural re-education, transfer training  Plan of Care Reviewed With: patient  Outcome Evaluation: Pt is an 83 yo female who presented from home with confusion, B LE weakness, reporting inability to get out of chair at home. Pt with recent fall and head injury 2/2 hypoglycemia, was seen in ED 6/21, head CT was (-) and pt was discharged home 6/22.  Pt now presents back stating her \"legs wouldn't work\" at home. Prior to admission, pt was living at home with son and independent with all mobility using rwx. Upon exam, pt demonstrates impaired B ankle ROM, B LE weakness, impaired balance, and decreased endurance limiting mobility. B LE erythema observed and pt reports B LEs are tender and painful with movement. Pt required mod A with bed mobility " and was unable to stand at EOB despite multiple attempts and assist x2. Pt would benefit from continued PT to address impairments and increase independence with functional mobility. Recommend DC to SNF for subacute rehab as pt is currently unsafe to return home.     Time Calculation:    PT Charges     Row Name 06/23/22 1146             Time Calculation    Start Time 1110  -EE      Stop Time 1125  -EE      Time Calculation (min) 15 min  -EE      PT Received On 06/23/22  -EE      PT - Next Appointment 06/24/22  -EE      PT Goal Re-Cert Due Date 06/30/22  -EE            User Key  (r) = Recorded By, (t) = Taken By, (c) = Cosigned By    Initials Name Provider Type    EE Vaishali Short, PT Physical Therapist              Therapy Charges for Today     Code Description Service Date Service Provider Modifiers Qty    49989098318 HC PT EVAL MOD COMPLEXITY 2 6/23/2022 Vaishali Short, PT GP 1          PT G-Codes  Outcome Measure Options: AM-PAC 6 Clicks Basic Mobility (PT), Modified [place]  AM-PAC 6 Clicks Score (PT): 8  Modified [place] Scale: 4 - Moderately severe disability.  Unable to walk without assistance, and unable to attend to own bodily needs without assistance.     Patient was wearing a face mask during this therapy encounter. Therapist used appropriate personal protective equipment including mask and gloves.  Mask used was standard procedure mask. Appropriate PPE was worn during the entire therapy session. Hand hygiene was completed before and after therapy session. Patient is not in enhanced droplet precautions.       Vaishali Short PT  6/23/2022

## 2022-06-23 NOTE — ED NOTES
".  Nursing report ED to floor  Halie Guidry  82 y.o.  female    HPI :   Chief Complaint   Patient presents with   • Gait Problem   • Altered Mental Status       Admitting doctor:   Alireza Rodriguez MD    Admitting diagnosis:   The primary encounter diagnosis was Acute metabolic encephalopathy. A diagnosis of Weakness of both lower extremities was also pertinent to this visit.    Code status:   Current Code Status     Date Active Code Status Order ID Comments User Context       6/22/2022 2333 CPR (Attempt to Resuscitate) [id number]  Ceci Ram, [facility]     Advance Care Planning Activity      Questions for Current Code Status     Question Answer    Code Status (Patient has no pulse and is not breathing) CPR (Attempt to Resuscitate)    Medical Interventions (Patient has pulse or is breathing) Full Support          Allergies:   Sulfa antibiotics    Intake and Output    Intake/Output Summary (Last 24 hours) at 6/23/2022 0610  Last data filed at 6/23/2022 0429  Gross per 24 hour   Intake 0 ml   Output 200 ml   Net -200 ml       Weight:       06/23/22  0400   Weight: 85.3 kg (188 lb)       Most recent vitals:   Vitals:    06/23/22 0231 06/23/22 0301 06/23/22 0400 06/23/22 0501   BP: (!) 189/76 (!) 191/80 (!) 184/70 (!) 188/75   Pulse: 76 76  71   Resp:       Temp:       TempSrc:       SpO2: 90% 90%  91%   Weight:   85.3 kg (188 lb)    Height:   172.7 cm (68\")        Active LDAs/IV Access:   Lines, Drains & Airways     Active LDAs     Name Placement date Placement time Site Days    Peripheral IV 06/22/22 2140 Left Antecubital 06/22/22 2140  Antecubital  less than 1    External Urinary Catheter 06/23/22  0103  --  less than 1                Labs (abnormal labs have a star):   Labs Reviewed   COMPREHENSIVE METABOLIC PANEL - Abnormal; Notable for the following components:       Result Value    Glucose 126 (*)     Creatinine 1.16 (*)     eGFR 47.2 (*)     All other components within normal limits    Narrative:     " GFR Normal >60  Chronic Kidney Disease <60  Kidney Failure <15     URINALYSIS W/ MICROSCOPIC IF INDICATED (NO CULTURE) - Abnormal; Notable for the following components:    Ketones, UA Trace (*)     Protein, UA >=300 mg/dL (3+) (*)     All other components within normal limits   CBC WITH AUTO DIFFERENTIAL - Abnormal; Notable for the following components:    WBC 11.52 (*)     Lymphocyte % 13.2 (*)     Neutrophils, Absolute 8.48 (*)     Monocytes, Absolute 1.08 (*)     All other components within normal limits   CBC (NO DIFF) - Abnormal; Notable for the following components:    RBC 3.75 (*)     Hemoglobin 10.9 (*)     Hematocrit 32.6 (*)     All other components within normal limits   COMPREHENSIVE METABOLIC PANEL - Abnormal; Notable for the following components:    Glucose 234 (*)     Creatinine 1.05 (*)     eGFR 53.2 (*)     All other components within normal limits    Narrative:     GFR Normal >60  Chronic Kidney Disease <60  Kidney Failure <15     HEMOGLOBIN A1C - Abnormal; Notable for the following components:    Hemoglobin A1C 7.60 (*)     All other components within normal limits    Narrative:     Hemoglobin A1C Ranges:    Increased Risk for Diabetes  5.7% to 6.4%  Diabetes                     >= 6.5%  Diabetic Goal                < 7.0%   LIPID PANEL - Abnormal; Notable for the following components:    HDL Cholesterol 34 (*)     All other components within normal limits    Narrative:     Cholesterol Reference Ranges  (U.S. Department of Health and Human Services ATP III Classifications)    Desirable          <200 mg/dL  Borderline High    200-239 mg/dL  High Risk          >240 mg/dL      Triglyceride Reference Ranges  (U.S. Department of Health and Human Services ATP III Classifications)    Normal           <150 mg/dL  Borderline High  150-199 mg/dL  High             200-499 mg/dL  Very High        >500 mg/dL    HDL Reference Ranges  (U.S. Department of Health and Human Services ATP III Classifications)    Low      <40 mg/dl (major risk factor for CHD)  High    >60 mg/dl ('negative' risk factor for CHD)        LDL Reference Ranges  (U.S. Department of Health and Human Services ATP III Classifications)    Optimal          <100 mg/dL  Near Optimal     100-129 mg/dL  Borderline High  130-159 mg/dL  High             160-189 mg/dL  Very High        >189 mg/dL   COVID-19, NAY IN-HOUSE CEPHEID/HATTIE, NP SWAB IN TRANSPORT MEDIA 8-12 HR TAT - Normal    Narrative:     Fact sheet for providers: https://www.fda.gov/media/883548/download     Fact sheet for patients: https://www.fda.gov/media/755700/download   TSH - Normal   POCT GLUCOSE FINGERSTICK - Normal   COVID PRE-OP / PRE-PROCEDURE SCREENING ORDER (NO ISOLATION)    Narrative:     The following orders were created for panel order COVID PRE-OP / PRE-PROCEDURE SCREENING ORDER (NO ISOLATION) - Swab, Nasopharynx.  Procedure                               Abnormality         Status                     ---------                               -----------         ------                     COVID-19, NAY IN-HOUSE...[[id number]]  Normal              Final result                 Please view results for these tests on the individual orders.   URINALYSIS, MICROSCOPIC ONLY   POCT GLUCOSE FINGERSTICK   POCT GLUCOSE FINGERSTICK   POCT GLUCOSE FINGERSTICK   POCT GLUCOSE FINGERSTICK   POCT GLUCOSE FINGERSTICK   POCT GLUCOSE FINGERSTICK   POCT GLUCOSE FINGERSTICK   CBC AND DIFFERENTIAL    Narrative:     The following orders were created for panel order CBC & Differential.  Procedure                               Abnormality         Status                     ---------                               -----------         ------                     CBC Auto Differential[[id number]]        Abnormal            Final result                 Please view results for these tests on the individual orders.       EKG:   No orders to display       Meds given in ED:   Medications   sodium chloride 0.9 % flush 10 mL  (has no administration in time range)   sodium chloride 0.9 % infusion (75 mL/hr Intravenous New Bag 22 0244)   acetaminophen (TYLENOL) tablet 650 mg (has no administration in time range)     Or   acetaminophen (TYLENOL) suppository 650 mg (has no administration in time range)   labetalol (NORMODYNE,TRANDATE) injection 10 mg (has no administration in time range)   aspirin tablet 325 mg (has no administration in time range)     Or   aspirin suppository 300 mg (has no administration in time range)   atorvastatin (LIPITOR) tablet 80 mg (0 mg Oral Hold 22 0443)   ondansetron (ZOFRAN) injection 4 mg (has no administration in time range)   bisacodyl (DULCOLAX) suppository 10 mg (has no administration in time range)       Imaging results:  CT Head Without Contrast    Result Date: 2022  Electronically signed by Vadim Galaviz MD on 22 at 2311    CT Head Without Contrast    Result Date: 2022  Evidence of moderately extensive small vessel chronic ischemic change as described. Otherwise unremarkable CT scan of the head. No acute intracranial abnormality is identified.  This report was finalized on 2022 10:10 PM by Dr. Franco Bishop M.D.        Ambulatory status:   - up with assistance    Social issues:   Social History     Socioeconomic History   • Marital status:    Tobacco Use   • Smoking status: Former Smoker     Packs/day: 1.00     Quit date:      Years since quittin.4   • Smokeless tobacco: Never Used   Vaping Use   • Vaping Use: Never used   Substance and Sexual Activity   • Alcohol use: No   • Drug use: No   • Sexual activity: Defer       NIH Stroke Scale:  Interval: baseline

## 2022-06-23 NOTE — PLAN OF CARE
Goal Outcome Evaluation:      Bedside Swallow Evaluation completed - patient presents with functional oropharyngeal swallow, regular diet/thin liquids, medications whole w/thin liquids, straws OK, upright for all PO.  Communication/cognition WNL.  ST to sign off, please reconsult, if needed.

## 2022-06-23 NOTE — CONSULTS
"Neurology Consult Note    Consult Date: 6/23/2022    Referring MD: Efe Moses MD    Reason for Consult I have been asked to see the patient in neurological consultation to render advice and opinion regarding stroke    Halie Guidry is a 82 y.o. female with hypertension, diabetes, hypothyroidism, diabetic peripheral neuropathy, lumbar spinal stenosis.  She reports that yesterday she became weak and lightheaded with bilateral extremity weakness.  Her blood sugar was found to be low in the 60s.  She fell down and struck her head.  She had some orange juice and felt a little bit better.  She presented to the emergency department and was felt to be confused with bilateral lower extremity weakness.  Her confusion has resolved but her bilateral lower extremity weakness persists.    Past Medical History:   Diagnosis Date   • Anxiety    • Arthritis    • Benign essential hypertension 08/20/2014   • 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HCC)    • Depression    • Diabetes (HCC)    • Diabetes mellitus, type 2 (HCC)    • Disc degeneration, lumbar    • Headache, tension-type    • Hyperlipidemia    • Hypothyroidism    • Neuropathy    • Osteoporosis 09/09/2015   • Peripheral neuropathy    • Rotator cuff tear, left    • Scoliosis    • Shoulder pain     LEFT, TORN ROTATOR CUFF S/P FALL   • Spinal stenosis        ROS:  No fevers, chills  + weakness, numbness    Exam  BP (!) 181/70 (BP Location: Right arm, Patient Position: Lying)   Pulse 67   Temp 97.1 °F (36.2 °C)   Resp 14   Ht 172.7 cm (68\")   Wt 85.3 kg (188 lb)   SpO2 96%   BMI 28.59 kg/m²   Gen: NAD, vitals reviewed  MS: oriented x3, recent/remote memory intact, normal attention/concentration, language intact, no neglect.  CN: visual acuity grossly normal, PERRL, EOMI, no facial droop, no dysarthria  Motor: 5/5 throughout upper ext, 3/5 bilateral lower ext.  Reflexes: absent bilateral LE  Sensory: diminished to light touch and vibration BLE    DATA:    Lab Results   Component " Value Date    GLUCOSE 234 (H) 06/23/2022    CALCIUM 8.9 06/23/2022     06/23/2022    K 3.9 06/23/2022    CO2 23.0 06/23/2022     06/23/2022    BUN 23 06/23/2022    CREATININE 1.05 (H) 06/23/2022    EGFRIFAFRI 50 (L) 01/18/2021    EGFRIFNONA 51 (L) 02/28/2022    BCR 21.9 06/23/2022    ANIONGAP 12.0 06/23/2022     Lab Results   Component Value Date    WBC 9.94 06/23/2022    HGB 10.9 (L) 06/23/2022    HCT 32.6 (L) 06/23/2022    MCV 86.9 06/23/2022     06/23/2022       Lab review: GFr 51    Imaging review: MRI lumbar spine wo contrast ordered    Diagnoses:  Paraparesis  Lumbar spinal stenosis  Hypoglycemia  Diabetic peripheral neuropathy    Comment: Generalized weakness, likely due to hypoglycemia. Low suspicion for stroke based on history and exam. Known history of spinal stenosis. Suspect she may have exacerbated this from the fall    PLAN:  MRI brain cancelled and change to MRI lumbar spine

## 2022-06-23 NOTE — PLAN OF CARE
"Goal Outcome Evaluation:  Plan of Care Reviewed With: patient           Outcome Evaluation: OT:  Pt. is a 82 year old female was afmitte to the hospital with LE weakness.  Pt. fell at home 2 days ago came to the ER and was d/c back home, after being at home over night she stated \"my legs just stopped working, I can not walk\" pt. was brought back to the hospital yesterday.  Pt. is O x 3, she was (I) before her fall with a walker in the home, (I) with ADLs and cooking meals for herself.  Pt. presents today generalized weakness of (B)LEs and LUEs.  Pt. was dependant with LE dressing, bed mobility and was not able to complete a sit to stand transfer.   Pt. would benefit from skilled OT to address impriving BADLS, endurance and mobility.  Pt. would also benefit from a Sub Acute Rehab to maximize her strength and safety to return home.  OT wore a mask and eye protection, washed her hands before and afte the session.  Pt. wore a mask.  "

## 2022-06-23 NOTE — DISCHARGE PLACEMENT REQUEST
"Tabatha Guidry (82 y.o. Female)             Date of Birth   1940    Social Security Number       Address   [address] [place] [address]    Home Phone   [phone]    MRN   [id number]       [employer]   [employer]    Marital Status                               Admission Date   6/22/22    Admission Type   Emergency    Admitting Provider   Alireza Rodriguez MD    Attending Provider   Alireza Rodriguez MD    Department, Room/Bed   [facility] OBSERVATION, 129/1       Discharge Date       Discharge Disposition       Discharge Destination                               Attending Provider: Alireza Rodriguez MD    Allergies: Sulfa Antibiotics    Isolation: None   Infection: None   Code Status: CPR   Advance Care Planning Activity    Ht: 172.7 cm (68\")   Wt: 85.3 kg (188 lb)    Admission Cmt: None   Principal Problem: Hypoglycemia [E16.2]                 Active Insurance as of 6/22/2022     Primary Coverage     Payor Plan Insurance Group Employer/Plan Group    [employer] MED[employer]ARE REPLACEMENT [employer] REPLACEMENT [id number]     Payor Plan Address Payor Plan Phone Number Payor Plan Fax Number Effective Dates    [address] [address]   1/1/2016 - None Entered    [facility] [id number]       Subscriber Name Subscriber Birth Date Member ID       TABATHA GUIDRY 1940 [id number]                 Emergency Contacts      (Rel.) Home Phone Work Phone Mobile Phone    Derrick Guidry (Son) [phone] -- [phone]    Josse Guidry (Son) [phone] -- [phone]              "

## 2022-06-23 NOTE — THERAPY EVALUATION
Acute Care - Speech Language Pathology   Swallow Initial Evaluation/Discharge [facility]     Patient Name: Halie Guidry  : 1940  MRN: [id number]  Today's Date: 2022               Admit Date: 2022    Visit Dx:    ICD-10-CM ICD-9-CM   1. Acute metabolic encephalopathy  G93.41 348.31   2. Weakness of both lower extremities  R29.898 729.89     Patient Active Problem List   Diagnosis   • Compression fracture   • Lumbar degenerative disc disease   • Type 2 diabetes mellitus, with long-term current use of insulin ([facility]ly [facility])   • Hyperlipidemia   • Benign essential hypertension   • Primary hypothyroidism   • Leukocytosis   • Neuropathy   • Osteoporosis   • Proteinuria   • Tobacco abuse   • Diabetic eye exam ([facility]ly [facility])   • Generalized weakness   • Spinal stenosis   • Scoliosis   • Arthritis   • VBI (vertebrobasilar insufficiency)   • Orthostatic hypotension   • 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 ([facility])   • Severe hypothyroidism   • Noncompliance with medication regimen   • Vitamin D deficiency disease   • Altered mental status   • Tremor   • Seizure ([facility]ly [facility])   • Stage 3b chronic kidney disease ([facility])   • Thoracic degenerative disc disease   • Metabolic encephalopathy   • VIPUL (acute kidney injury) (Formerly [facility])   • Hyperglycemia   • Type II diabetes mellitus, uncontrolled   • Lower abdominal pain   • Transaminitis   • COVID-19 virus detected   • UTI (urinary tract infection)   • Emphysematous cystitis   • Choledocholithiasis   • Hypokalemia   • Hypoxia   • Generalized abdominal pain   • History of Clostridium difficile infection   • History of ERCP   • Acute UTI (urinary tract infection)   • Hypomagnesemia   • Burning pain   • Anxiety disorder   • Neuropathic pain   • Intertrigo   • Acute metabolic encephalopathy     Past Medical History:   Diagnosis Date   • Anxiety    • Arthritis    • Benign essential hypertension 2014   • 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[facility])    • Depression    • Diabetes ([facility])    •  Diabetes mellitus, type 2 (HCC)    • Disc degeneration, lumbar    • Headache, tension-type    • Hyperlipidemia    • Hypothyroidism    • Neuropathy    • Osteoporosis 09/09/2015   • Peripheral neuropathy    • Rotator cuff tear, left    • Scoliosis    • Shoulder pain     LEFT, TORN ROTATOR CUFF S/P FALL   • Spinal stenosis      Past Surgical History:   Procedure Laterality Date   • APPENDECTOMY     • BILATERAL BREAST REDUCTION Bilateral 08/2015   • CATARACT EXTRACTION  03/2015   • CHOLECYSTECTOMY WITH INTRAOPERATIVE CHOLANGIOGRAM N/A 3/27/2022    Procedure: CHOLECYSTECTOMY LAPAROSCOPIC INTRAOPERATIVE CHOLANGIOGRAM;  Surgeon: Aiyana Hill MD;  Location: [facility] MAIN OR;  Service: General;  Laterality: N/A;   • COLONOSCOPY  06/05/2015    WNL   • ERCP N/A 2/25/2022    Procedure: ENDOSCOPIC RETROGRADE CHOLANGIOPANCREATOGRAPHY with sphincterotomy and balloon sweep;  Surgeon: Chilo Wilhelm MD;  Location: [facility] ENDOSCOPY;  Service: Gastroenterology;  Laterality: N/A;  PRE/POST - CBD stones   • ERCP N/A 3/28/2022    Procedure: ENDOSCOPIC RETROGRADE CHOLANGIOPANCREATOGRAPHY WITH SPHINCTEROTOMY AND BALLOON SWEEP;  Surgeon: Chilo Wilhelm MD;  Location: [facility] ENDOSCOPY;  Service: Gastroenterology;  Laterality: N/A;  PRE: COMMON DUCT STONE  POST: COMMON DUCT STONE   • KYPHOPLASTY     • REDUCTION MAMMAPLASTY     • TONSILLECTOMY         SLP Recommendation and Plan  SLP Swallowing Diagnosis: swallow WFL (06/23/22 1038)  SLP Diet Recommendation: regular textures, thin liquids (06/23/22 1038)     Monitor for Signs of Aspiration: yes, notify SLP if any concerns (06/23/22 1038)     Swallow Criteria for Skilled Therapeutic Interventions Met: no problems identified which require skilled intervention (06/23/22 1038)  Anticipated Discharge Disposition (SLP): unknown (06/23/22 1038)  Rehab Potential/Prognosis, Swallowing: good, to achieve stated therapy goals (06/23/22 1038)  Therapy Frequency (Swallow): evaluation only  (06/23/22 1038)              Anticipated Discharge Disposition (SLP): unknown (06/23/22 1038)                           SWALLOW EVALUATION (last 72 hours)     SLP Adult Swallow Evaluation     Row Name 06/23/22 1038                   Rehab Evaluation    Document Type evaluation  -CB        Subjective Information no complaints  -CB        Patient Observations alert;cooperative;agree to therapy  -CB        Patient Effort good  -CB                  General Information    Patient Profile Reviewed yes  -CB        Pertinent History Of Current Problem Pt admitted secondary fall and increased confusion.  -CB        Current Method of Nutrition NPO  -CB        Precautions/Limitations, Vision WFL  -CB        Precautions/Limitations, Hearing WFL  -CB        Prior Level of Function-Communication WFL  -CB        Prior Level of Function-Swallowing safe, efficient swallowing in all situations  -CB        Plans/Goals Discussed with patient  -CB                  General Eating/Swallowing Observations    Respiratory Support Currently in Use room air  -CB        Eating/Swallowing Skills self-fed  -CB        Positioning During Eating upright 90 degree  -CB        Utensils Used spoon;cup;straw  -CB        Consistencies Trialed regular textures;mixed consistency;pureed;ice chips;thin liquids  -CB        Pre SpO2 (%) 96  -CB        Post SpO2 (%) 96  -CB                  Clinical Swallow Eval    Clinical Swallow Evaluation Summary Pt presents with slight left facial droop, prior history of infarct.  Oral motor function WNL and upper/lower dentures in place.  All consistencies tested with timely bolus formation, AP transit, swallow initiation with no overt s/s aspiration noted - no change in VQ or O2 sats.  Pt is appropriate for regular diet with thin liquids, straws OK, medications whole w/thin liquid, upright for all PO.  Pt had incorrect order entered initially (communication), correct order entered with no deficits noted in  cognition/communication at time of this evaluation.  No further ST needs noted, please reconsult, if needed.  -CB                  MBS/VFSS    Utensils Used spoon;cup;straw  -CB                  SLP Evaluation Clinical Impression    SLP Swallowing Diagnosis swallow WFL  -CB        Functional Impact no impact on function  -CB        Rehab Potential/Prognosis, Swallowing good, to achieve stated therapy goals  -CB        Swallow Criteria for Skilled Therapeutic Interventions Met no problems identified which require skilled intervention  -CB                  Recommendations    Therapy Frequency (Swallow) evaluation only  -CB        SLP Diet Recommendation regular textures;thin liquids  -CB        Recommended Precautions and Strategies upright posture during/after eating;general aspiration precautions  -CB        Oral Care Recommendations Oral Care BID/PRN  -CB        SLP Rec. for Method of Medication Administration meds whole;with thin liquids;as tolerated  -CB        Monitor for Signs of Aspiration yes;notify SLP if any concerns  -CB        Anticipated Discharge Disposition (SLP) unknown  -CB              User Key  (r) = Recorded By, (t) = Taken By, (c) = Cosigned By    Initials Name Effective Dates    Manda Morales SLP 11/16/21 -                 EDUCATION  The patient has been educated in the following areas:   Dysphagia (Swallowing Impairment).            SLP Outcome Measures (last 72 hours)     SLP Outcome Measures     Row Name 06/23/22 1035             SLP Outcome Measures    Outcome Measure Used? Adult NOMS  -CB              Adult FCM Scores    FCM Chosen Swallowing  -CB      Swallowing FCM Score 7  -CB            User Key  (r) = Recorded By, (t) = Taken By, (c) = Cosigned By    Initials Name Effective Dates    Manda Morales SLP 11/16/21 -                  Time Calculation:    Time Calculation- SLP     Row Name 06/23/22 1100             Time Calculation- SLP    SLP Start Time 1038  -CB      SLP Received On  06/23/22  -CB              Untimed Charges    [id number] Eval Oral Pharyng Swallow Minutes 25  -CB              Total Minutes    Untimed Charges Total Minutes 25  -CB       Total Minutes 25  -CB            User Key  (r) = Recorded By, (t) = Taken By, (c) = Cosigned By    Initials Name Provider Type    Manda Morales SLP Speech and Language Pathologist                Therapy Charges for Today     Code Description Service Date Service Provider Modifiers Qty    [id number] HC ST EVAL ORAL PHARYNG SWALLOW 2 6/23/2022 Manda Stephens SLP GN 1               SLP Discharge Summary  Anticipated Discharge Disposition (SLP): unknown    NATO PIPER  6/23/2022

## 2022-06-23 NOTE — ED NOTES
MD Farhat okay for pt to eat- pt passed swallow screen, turkey sandwich and water provided for pt.

## 2022-06-23 NOTE — ED PROVIDER NOTES
" EMERGENCY DEPARTMENT ENCOUNTER    Room Number:  37/37  Date of encounter:  6/22/2022  PCP: Napoleon Mead MD  Historian: Patient and patient's family      HPI:  Chief Complaint: Confusion and weakness  A complete HPI/ROS/PMH/PSH/SH/FH are unobtainable due to: Poor historian    Context: Halie Guidry is a 82 y.o. female who presents to the ED c/o generalized confusion today along with weakness and \"legs not working\".  Patient is typically able to get around the house with a walker, but since last night has been sitting in the recliner and even urinating on herself because she is unable to get up and walk.    Patient was seen in the ED yesterday after a fall with head injury secondary to hypoglycemic episode.  That was evaluated with CT and lab work and she was finally able to be discharged home last night under the supervision of her family with whom she lives.    She said that she slept last night and the recliner with her feet down, had to urinate on herself because she could not get up.  She does have some poorly described pain in the left knee but that was x-rayed last night as well and showed no fracture.  She was not prescribed nor did she take any additional medications and did take all of her normal prescriptions.  She has not fallen again and her blood sugars have been reasonable.  She is eating and drinking and having no nausea vomiting.    There are no specific focal deficits, just a generalized forgetfulness which is not usual for her and both legs equally just not working to support her weight.  They said that when she tries to get up with their assistance and they asked her to take a step she just moves her feet just a little bit and it is almost as if they are connected.      PAST MEDICAL HISTORY  Active Ambulatory Problems     Diagnosis Date Noted   • Compression fracture 04/22/2009   • Lumbar degenerative disc disease 07/05/2012   • Type 2 diabetes mellitus, with long-term current use of " insulin (Formerly [facility])    • Hyperlipidemia    • Benign essential hypertension 08/20/2014   • Primary hypothyroidism    • Leukocytosis    • Neuropathy    • Osteoporosis 09/09/2015   • Proteinuria 02/28/2012   • Tobacco abuse 08/22/2013   • Diabetic eye exam (Formerly [facility]) 02/12/2014   • Generalized weakness 05/27/2017   • Spinal stenosis 05/27/2017   • Scoliosis 05/27/2017   • Arthritis 05/27/2017   • VBI (vertebrobasilar insufficiency) 05/28/2017   • Orthostatic hypotension 05/28/2017   • 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 (Formerly [facility]) 05/28/2017   • Severe hypothyroidism 05/30/2017   • Noncompliance with medication regimen 05/30/2017   • Vitamin D deficiency disease 06/01/2017   • Altered mental status 12/15/2017   • Tremor 01/09/2018   • Seizure (Formerly [facility]) 05/21/2019   • Stage 3b chronic kidney disease (Formerly [facility]) 03/09/2012   • Thoracic degenerative disc disease 01/27/2020   • Metabolic encephalopathy 12/02/2021   • VIPUL (acute kidney injury) (Formerly [facility]) 12/02/2021   • Hyperglycemia 12/02/2021   • Type II diabetes mellitus, uncontrolled 12/02/2021   • Lower abdominal pain 02/23/2022   • Transaminitis 02/23/2022   • COVID-19 virus detected 02/23/2022   • UTI (urinary tract infection) 02/23/2022   • 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   • Choledocholithiasis 02/23/2022   • Hypokalemia 02/24/2022   • Hypoxia 02/24/2022   • Generalized abdominal pain 03/26/2022   • History of Clostridium difficile infection 03/26/2022   • History of ERCP 03/26/2022   • Acute UTI (urinary tract infection) 03/27/2022   • Hypomagnesemia 03/28/2022   • Burning pain 05/27/2022   • Anxiety disorder 05/27/2022   • Neuropathic pain 05/27/2022   • Intertrigo 05/27/2022     Resolved Ambulatory Problems     Diagnosis Date Noted   • No Resolved Ambulatory Problems     Past Medical History:   Diagnosis Date   • Anxiety    • 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Formerly [facility])    • Depression    • Diabetes (Formerly [facility])    • Diabetes mellitus, type 2 (Formerly [facility])    • Disc degeneration, lumbar    • Headache,  tension-type    • Hypothyroidism    • Peripheral neuropathy    • Rotator cuff tear, left    • Shoulder pain          PAST SURGICAL HISTORY  Past Surgical History:   Procedure Laterality Date   • APPENDECTOMY     • BILATERAL BREAST REDUCTION Bilateral 2015   • CATARACT EXTRACTION  2015   • CHOLECYSTECTOMY WITH INTRAOPERATIVE CHOLANGIOGRAM N/A 3/27/2022    Procedure: CHOLECYSTECTOMY LAPAROSCOPIC INTRAOPERATIVE CHOLANGIOGRAM;  Surgeon: Aiyana Hill MD;  Location: [facility] MAIN OR;  Service: General;  Laterality: N/A;   • COLONOSCOPY  2015    WNL   • ERCP N/A 2022    Procedure: ENDOSCOPIC RETROGRADE CHOLANGIOPANCREATOGRAPHY with sphincterotomy and balloon sweep;  Surgeon: Chilo Wilhelm MD;  Location: [facility] ENDOSCOPY;  Service: Gastroenterology;  Laterality: N/A;  PRE/POST - CBD stones   • ERCP N/A 3/28/2022    Procedure: ENDOSCOPIC RETROGRADE CHOLANGIOPANCREATOGRAPHY WITH SPHINCTEROTOMY AND BALLOON SWEEP;  Surgeon: Chilo Wilhelm MD;  Location: [facility] ENDOSCOPY;  Service: Gastroenterology;  Laterality: N/A;  PRE: COMMON DUCT STONE  POST: COMMON DUCT STONE   • KYPHOPLASTY     • REDUCTION MAMMAPLASTY     • TONSILLECTOMY           FAMILY HISTORY  Family History   Problem Relation Age of Onset   • Bone cancer Mother    • No Known Problems Father          SOCIAL HISTORY  Social History     Socioeconomic History   • Marital status:    Tobacco Use   • Smoking status: Former Smoker     Packs/day: 1.00     Quit date:      Years since quittin.4   • Smokeless tobacco: Never Used   Vaping Use   • Vaping Use: Never used   Substance and Sexual Activity   • Alcohol use: No   • Drug use: No   • Sexual activity: Defer         ALLERGIES  Sulfa antibiotics        REVIEW OF SYSTEMS  Review of Systems   Limited due to poor historian      PHYSICAL EXAM    I have reviewed the triage vital signs and nursing notes.    ED Triage Vitals [22]   Temp Heart Rate Resp BP SpO2   97.1 °F (36.2  °C) 60 14 (!) 140/110 96 %      Temp src Heart Rate Source Patient Position BP Location FiO2 (%)   Tympanic Monitor Sitting Right arm --       Physical Exam  GENERAL: Awake and alert, nontoxic, very pleasant  HENT: nares patent, occipital scalp tenderness is present, neck and spine are nontender  EYES: no scleral icterus, PERRL, EOMI  CV: regular rhythm, regular rate  RESPIRATORY: normal effort, CTA bilaterally with no distress  ABDOMEN: soft, nontender palpation bowel sounds present  MUSCULOSKELETAL: no deformity, 2+ pitting edema in the lower extremities to the thighs, but again she sat in the recliner all night with her feet down and was not wearing compression stockings.  There is soft tissue tenderness and swelling to the medial aspect of the left knee but no deformity.  Plain films from yesterday reviewed and negative except for arthritic changes  NEURO: alert, moves all extremities, follows commands.  She is able to move her extremities independently by following commands.  Both extremities seem symmetrically weak in general but sensations intact, distal pulses intact, and I did not test her gait  SKIN: warm, dry        LAB RESULTS  Recent Results (from the past 24 hour(s))   POC Glucose Once    Collection Time: 06/22/22  9:35 PM    Specimen: Blood   Result Value Ref Range    Glucose 124 70 - 130 mg/dL   Comprehensive Metabolic Panel    Collection Time: 06/22/22  9:40 PM    Specimen: Blood   Result Value Ref Range    Glucose 126 (H) 65 - 99 mg/dL    BUN 23 8 - 23 mg/dL    Creatinine 1.16 (H) 0.57 - 1.00 mg/dL    Sodium 139 136 - 145 mmol/L    Potassium 4.2 3.5 - 5.2 mmol/L    Chloride 102 98 - 107 mmol/L    CO2 24.0 22.0 - 29.0 mmol/L    Calcium 9.2 8.6 - 10.5 mg/dL    Total Protein 7.2 6.0 - 8.5 g/dL    Albumin 3.80 3.50 - 5.20 g/dL    ALT (SGPT) 9 1 - 33 U/L    AST (SGOT) 12 1 - 32 U/L    Alkaline Phosphatase 100 39 - 117 U/L    Total Bilirubin 0.6 0.0 - 1.2 mg/dL    Globulin 3.4 gm/dL    A/G Ratio 1.1 g/dL     BUN/Creatinine Ratio 19.8 7.0 - 25.0    Anion Gap 13.0 5.0 - 15.0 mmol/L    eGFR 47.2 (L) >60.0 mL/min/1.73   CBC Auto Differential    Collection Time: 06/22/22  9:40 PM    Specimen: Blood   Result Value Ref Range    WBC 11.52 (H) 3.40 - 10.80 10*3/mm3    RBC 4.21 3.77 - 5.28 10*6/mm3    Hemoglobin 12.1 12.0 - 15.9 g/dL    Hematocrit 36.2 34.0 - 46.6 %    MCV 86.0 79.0 - 97.0 fL    MCH 28.7 26.6 - 33.0 pg    MCHC 33.4 31.5 - 35.7 g/dL    RDW 13.7 12.3 - 15.4 %    RDW-SD 41.5 37.0 - 54.0 fl    MPV 9.5 6.0 - 12.0 fL    Platelets 250 140 - 450 10*3/mm3    Neutrophil % 73.6 42.7 - 76.0 %    Lymphocyte % 13.2 (L) 19.6 - 45.3 %    Monocyte % 9.4 5.0 - 12.0 %    Eosinophil % 2.4 0.3 - 6.2 %    Basophil % 0.6 0.0 - 1.5 %    Neutrophils, Absolute 8.48 (H) 1.70 - 7.00 10*3/mm3    Lymphocytes, Absolute 1.52 0.70 - 3.10 10*3/mm3    Monocytes, Absolute 1.08 (H) 0.10 - 0.90 10*3/mm3    Eosinophils, Absolute 0.28 0.00 - 0.40 10*3/mm3    Basophils, Absolute 0.07 0.00 - 0.20 10*3/mm3   Urinalysis With Microscopic If Indicated (No Culture) - Urine, Catheter    Collection Time: 06/22/22  9:56 PM    Specimen: Urine, Catheter   Result Value Ref Range    Color, UA Yellow Yellow, Straw    Appearance, UA Clear Clear    pH, UA 5.5 5.0 - 8.0    Specific Gravity, UA 1.022 1.005 - 1.030    Glucose, UA Negative Negative    Ketones, UA Trace (A) Negative    Bilirubin, UA Negative Negative    Blood, UA Negative Negative    Protein, UA >=300 mg/dL (3+) (A) Negative    Leuk Esterase, UA Negative Negative    Nitrite, UA Negative Negative    Urobilinogen, UA 0.2 E.U./dL 0.2 - 1.0 E.U./dL   Urinalysis, Microscopic Only - Urine, Catheter    Collection Time: 06/22/22  9:56 PM    Specimen: Urine, Catheter   Result Value Ref Range    RBC, UA 0-2 None Seen, 0-2 /HPF    WBC, UA 0-2 None Seen, 0-2 /HPF    Bacteria, UA None Seen None Seen /HPF    Squamous Epithelial Cells, UA 0-2 None Seen, 0-2 /HPF    Hyaline Casts, UA 7-12 None Seen /LPF    Methodology  Automated Microscopy    COVID-19, NAY IN-HOUSE CEPHEID/HATTIE NP SWAB IN TRANSPORT MEDIA 8-12 HR TAT - Swab, Nasopharynx    Collection Time: 06/22/22 10:55 PM    Specimen: Nasopharynx; Swab   Result Value Ref Range    COVID19 Not Detected Not Detected - Ref. Range       Ordered the above labs and independently reviewed the results.        RADIOLOGY  CT Head Without Contrast    Result Date: 6/22/2022  Patient: TABATHA CARTER  Time Out: 23:11 Exam(s): CT HEAD Without Contrast EXAM:   CT Head Without Intravenous Contrast CLINICAL HISTORY:    Reason for exam: ams, unable to ambulate. TECHNIQUE:   Axial computed tomography images of the head brain without intravenous contrast.  CTDI is 54.8 mGy and DLP is 1007.3 mGy-cm.  This CT exam was performed according to the principle of ALARA (As Low As Reasonably Achievable) by using one or more of the following dose reduction techniques: automated exposure control, adjustment of the mA and or kV according to patient size, and or use of iterative reconstruction technique. COMPARISON:   6 21 2022. FINDINGS:   Brain:  Chronic lacunar infarcts basal ganglia bilaterally.  No abnormal extra-axial collection is noted.  No hemorrhage.   Midline shift:  Midline anatomy is unremarkable.   Ventricles:  There is prominence of the ventricular system, cortical sulci, basilar cisterns, compatible with age related atrophy.   Bones joints:  Calvarium is within normal limits.  No acute fracture.   Soft tissues:  Unremarkable.   Sinuses:  Visualized sinuses are unremarkable.   Mastoid air cells:  Mastoid air cells are well pneumatized. IMPRESSION:     1.  Age-related atrophy and small vessel disease of aging. 2.  Chronic lacunar infarcts. 3.  No acute intracranial pathology. 4.  If there is concern for etiology such as early acute lacunar infarcts,  MRI imaging of the brain with diffusion-weighted sequences should be performed.     Electronically signed by Vadim Galaviz MD on 06-22-22 at  2311      I ordered the above noted radiological studies. Reviewed by me and discussed with radiologist.  See dictation for official radiology interpretation.      PROCEDURES    Procedures      MEDICATIONS GIVEN IN ER    Medications   sodium chloride 0.9 % flush 10 mL (has no administration in time range)   sodium chloride 0.9 % infusion (has no administration in time range)   acetaminophen (TYLENOL) tablet 650 mg (has no administration in time range)     Or   acetaminophen (TYLENOL) suppository 650 mg (has no administration in time range)   labetalol (NORMODYNE,TRANDATE) injection 10 mg (has no administration in time range)   aspirin tablet 325 mg (has no administration in time range)     Or   aspirin suppository 300 mg (has no administration in time range)   atorvastatin (LIPITOR) tablet 80 mg (has no administration in time range)   ondansetron (ZOFRAN) injection 4 mg (has no administration in time range)   bisacodyl (DULCOLAX) suppository 10 mg (has no administration in time range)         PROGRESS, DATA ANALYSIS, CONSULTS, AND MEDICAL DECISION MAKING    All labs have been independently reviewed by me.  All radiology studies have been reviewed by me and discussed with radiologist dictating the report.   EKG's independently viewed and interpreted by me.  Discussion below represents my analysis of pertinent findings related to patient's condition, differential diagnosis, treatment plan and final disposition.        ED Course as of 06/22/22 2359 Wed Jun 22, 2022   2358 CBC shows mild leukocytosis, chemistry shows normal glucose and mild VIPUL [DP]   2358 Urinalysis unremarkable [DP]   2358 CT scan of the head without contrast shows no acute intracranial hemorrhage or mass-effect. [DP]   2358 tPA is not indicated as the NIH is 0.  She just has this generalized weakness in both legs which is symmetric and more of a metabolic type phenomenon in my opinion.  Her confusion is very mild if at all at this point.  She does  not appear toxic, labs do not suggest an etiology, no infectious etiology that I can see, I spoke with nurse practitioner from [facility] who agrees admit the patient on behalf of Dr. Moses for further evaluation. [DP]      ED Course User Index  [DP] Kevan Dougherty MD           PPE: The patient wore a surgical mask throughout the entire patient encounter. I wore an N95.    AS OF 23:59 EDT VITALS:    BP - 174/70  HR - 60  TEMP - 97.1 °F (36.2 °C) (Tympanic)  O2 SATS - 96%        DIAGNOSIS  Final diagnoses:   Acute metabolic encephalopathy   Weakness of both lower extremities         DISPOSITION  Admit           Kevan Dougherty MD  06/23/22 0000

## 2022-06-24 VITALS
OXYGEN SATURATION: 95 % | BODY MASS INDEX: 28.49 KG/M2 | HEIGHT: 68 IN | RESPIRATION RATE: 16 BRPM | HEART RATE: 67 BPM | WEIGHT: 188 LBS | SYSTOLIC BLOOD PRESSURE: 166 MMHG | TEMPERATURE: 98.4 F | DIASTOLIC BLOOD PRESSURE: 71 MMHG

## 2022-06-24 PROBLEM — G93.41 ACUTE METABOLIC ENCEPHALOPATHY: Status: ACTIVE | Noted: 2022-06-24

## 2022-06-24 PROBLEM — G93.41 ACUTE METABOLIC ENCEPHALOPATHY: Status: RESOLVED | Noted: 2022-06-22 | Resolved: 2022-06-24

## 2022-06-24 PROBLEM — R29.898 WEAKNESS OF BOTH LOWER EXTREMITIES: Status: ACTIVE | Noted: 2022-06-24

## 2022-06-24 PROBLEM — E16.2 HYPOGLYCEMIA: Status: RESOLVED | Noted: 2022-06-23 | Resolved: 2022-06-24

## 2022-06-24 LAB
ANION GAP SERPL CALCULATED.3IONS-SCNC: 11 MMOL/L (ref 5–15)
BUN SERPL-MCNC: 17 MG/DL (ref 8–23)
BUN/CREAT SERPL: 20.2 (ref 7–25)
CALCIUM SPEC-SCNC: 8.7 MG/DL (ref 8.6–10.5)
CHLORIDE SERPL-SCNC: 104 MMOL/L (ref 98–107)
CO2 SERPL-SCNC: 24 MMOL/L (ref 22–29)
CREAT SERPL-MCNC: 0.84 MG/DL (ref 0.57–1)
DEPRECATED RDW RBC AUTO: 41.2 FL (ref 37–54)
EGFRCR SERPLBLD CKD-EPI 2021: 69.5 ML/MIN/1.73
ERYTHROCYTE [DISTWIDTH] IN BLOOD BY AUTOMATED COUNT: 13.4 % (ref 12.3–15.4)
FERRITIN SERPL-MCNC: 78.6 NG/ML (ref 13–150)
FOLATE SERPL-MCNC: 16.5 NG/ML (ref 4.78–24.2)
GLUCOSE BLDC GLUCOMTR-MCNC: 150 MG/DL (ref 70–130)
GLUCOSE BLDC GLUCOMTR-MCNC: 176 MG/DL (ref 70–130)
GLUCOSE BLDC GLUCOMTR-MCNC: 189 MG/DL (ref 70–130)
GLUCOSE SERPL-MCNC: 137 MG/DL (ref 65–99)
HCT VFR BLD AUTO: 31 % (ref 34–46.6)
HGB BLD-MCNC: 10.7 G/DL (ref 12–15.9)
IRON 24H UR-MRATE: 23 MCG/DL (ref 37–145)
IRON SATN MFR SERPL: 6 % (ref 20–50)
MCH RBC QN AUTO: 29.2 PG (ref 26.6–33)
MCHC RBC AUTO-ENTMCNC: 34.5 G/DL (ref 31.5–35.7)
MCV RBC AUTO: 84.5 FL (ref 79–97)
PLATELET # BLD AUTO: 248 10*3/MM3 (ref 140–450)
PMV BLD AUTO: 9.7 FL (ref 6–12)
POTASSIUM SERPL-SCNC: 3.8 MMOL/L (ref 3.5–5.2)
RBC # BLD AUTO: 3.67 10*6/MM3 (ref 3.77–5.28)
SODIUM SERPL-SCNC: 139 MMOL/L (ref 136–145)
TIBC SERPL-MCNC: 356 MCG/DL (ref 298–536)
TRANSFERRIN SERPL-MCNC: 239 MG/DL (ref 200–360)
VIT B12 BLD-MCNC: 265 PG/ML (ref 211–946)
WBC NRBC COR # BLD: 10.39 10*3/MM3 (ref 3.4–10.8)

## 2022-06-24 PROCEDURE — 63710000001 INSULIN LISPRO (HUMAN) PER 5 UNITS: Performed by: NURSE PRACTITIONER

## 2022-06-24 PROCEDURE — 63710000001 INSULIN LISPRO (HUMAN) PER 5 UNITS: Performed by: INTERNAL MEDICINE

## 2022-06-24 PROCEDURE — 82746 ASSAY OF FOLIC ACID SERUM: CPT | Performed by: NURSE PRACTITIONER

## 2022-06-24 PROCEDURE — 82607 VITAMIN B-12: CPT | Performed by: NURSE PRACTITIONER

## 2022-06-24 PROCEDURE — 96361 HYDRATE IV INFUSION ADD-ON: CPT

## 2022-06-24 PROCEDURE — 96372 THER/PROPH/DIAG INJ SC/IM: CPT

## 2022-06-24 PROCEDURE — 84466 ASSAY OF TRANSFERRIN: CPT | Performed by: INTERNAL MEDICINE

## 2022-06-24 PROCEDURE — 80048 BASIC METABOLIC PNL TOTAL CA: CPT | Performed by: NURSE PRACTITIONER

## 2022-06-24 PROCEDURE — 82962 GLUCOSE BLOOD TEST: CPT

## 2022-06-24 PROCEDURE — 83540 ASSAY OF IRON: CPT | Performed by: INTERNAL MEDICINE

## 2022-06-24 PROCEDURE — 99214 OFFICE O/P EST MOD 30 MIN: CPT | Performed by: NURSE PRACTITIONER

## 2022-06-24 PROCEDURE — 85027 COMPLETE CBC AUTOMATED: CPT | Performed by: NURSE PRACTITIONER

## 2022-06-24 PROCEDURE — 96376 TX/PRO/DX INJ SAME DRUG ADON: CPT

## 2022-06-24 PROCEDURE — G0378 HOSPITAL OBSERVATION PER HR: HCPCS

## 2022-06-24 PROCEDURE — 25010000002 CYANOCOBALAMIN PER 1000 MCG: Performed by: INTERNAL MEDICINE

## 2022-06-24 PROCEDURE — 82728 ASSAY OF FERRITIN: CPT | Performed by: INTERNAL MEDICINE

## 2022-06-24 PROCEDURE — 97530 THERAPEUTIC ACTIVITIES: CPT

## 2022-06-24 RX ORDER — LISINOPRIL 10 MG/1
10 TABLET ORAL DAILY
Status: DISCONTINUED | OUTPATIENT
Start: 2022-06-24 | End: 2022-06-24 | Stop reason: HOSPADM

## 2022-06-24 RX ORDER — CYANOCOBALAMIN 1000 UG/ML
1000 INJECTION, SOLUTION INTRAMUSCULAR; SUBCUTANEOUS ONCE
Status: COMPLETED | OUTPATIENT
Start: 2022-06-24 | End: 2022-06-24

## 2022-06-24 RX ORDER — ASPIRIN 81 MG/1
81 TABLET ORAL DAILY
Start: 2022-06-25 | End: 2022-12-02

## 2022-06-24 RX ORDER — INSULIN LISPRO 100 [IU]/ML
6 INJECTION, SOLUTION INTRAVENOUS; SUBCUTANEOUS
Status: DISCONTINUED | OUTPATIENT
Start: 2022-06-24 | End: 2022-06-24 | Stop reason: HOSPADM

## 2022-06-24 RX ORDER — AMLODIPINE BESYLATE 5 MG/1
5 TABLET ORAL
Status: DISCONTINUED | OUTPATIENT
Start: 2022-06-24 | End: 2022-06-24 | Stop reason: HOSPADM

## 2022-06-24 RX ORDER — CHOLECALCIFEROL (VITAMIN D3) 125 MCG
1000 CAPSULE ORAL DAILY
Status: DISCONTINUED | OUTPATIENT
Start: 2022-06-25 | End: 2022-06-24 | Stop reason: HOSPADM

## 2022-06-24 RX ORDER — LABETALOL HYDROCHLORIDE 5 MG/ML
10 INJECTION, SOLUTION INTRAVENOUS
Status: DISCONTINUED | OUTPATIENT
Start: 2022-06-24 | End: 2022-06-24 | Stop reason: HOSPADM

## 2022-06-24 RX ORDER — LISINOPRIL 20 MG/1
20 TABLET ORAL DAILY
Qty: 30 TABLET | Refills: 0 | Status: SHIPPED | OUTPATIENT
Start: 2022-06-24 | End: 2022-09-06 | Stop reason: HOSPADM

## 2022-06-24 RX ORDER — HYDROCODONE BITARTRATE AND ACETAMINOPHEN 5; 325 MG/1; MG/1
1 TABLET ORAL EVERY 6 HOURS PRN
Qty: 15 TABLET | Refills: 0 | Status: SHIPPED | OUTPATIENT
Start: 2022-06-24 | End: 2022-11-11 | Stop reason: HOSPADM

## 2022-06-24 RX ORDER — PREGABALIN 50 MG/1
50 CAPSULE ORAL 3 TIMES DAILY
Qty: 90 CAPSULE | Refills: 1 | Status: SHIPPED | OUTPATIENT
Start: 2022-06-24 | End: 2022-10-27 | Stop reason: SDUPTHER

## 2022-06-24 RX ORDER — AMLODIPINE BESYLATE 5 MG/1
5 TABLET ORAL
Start: 2022-06-25 | End: 2022-09-06 | Stop reason: HOSPADM

## 2022-06-24 RX ORDER — INSULIN GLARGINE 300 U/ML
18 INJECTION, SOLUTION SUBCUTANEOUS NIGHTLY
Qty: 27 ML | Refills: 3 | Status: ON HOLD
Start: 2022-06-24 | End: 2022-11-04

## 2022-06-24 RX ORDER — ASPIRIN 81 MG/1
81 TABLET ORAL DAILY
Status: DISCONTINUED | OUTPATIENT
Start: 2022-06-24 | End: 2022-06-24 | Stop reason: HOSPADM

## 2022-06-24 RX ORDER — INSULIN LISPRO 100 [IU]/ML
6 INJECTION, SOLUTION INTRAVENOUS; SUBCUTANEOUS
Qty: 15 PEN | Refills: 3
Start: 2022-06-24 | End: 2022-11-11 | Stop reason: HOSPADM

## 2022-06-24 RX ADMIN — BISOPROLOL FUMARATE 2.5 MG: 5 TABLET, FILM COATED ORAL at 09:01

## 2022-06-24 RX ADMIN — HYDROCODONE BITARTRATE AND ACETAMINOPHEN 1 TABLET: 5; 325 TABLET ORAL at 13:39

## 2022-06-24 RX ADMIN — LISINOPRIL 10 MG: 10 TABLET ORAL at 09:01

## 2022-06-24 RX ADMIN — INSULIN LISPRO 6 UNITS: 100 INJECTION, SOLUTION INTRAVENOUS; SUBCUTANEOUS at 13:22

## 2022-06-24 RX ADMIN — CYANOCOBALAMIN 1000 MCG: 1000 INJECTION, SOLUTION INTRAMUSCULAR; SUBCUTANEOUS at 13:23

## 2022-06-24 RX ADMIN — LABETALOL HYDROCHLORIDE 10 MG: 5 INJECTION, SOLUTION INTRAVENOUS at 06:16

## 2022-06-24 RX ADMIN — ASPIRIN 81 MG: 81 TABLET, COATED ORAL at 13:23

## 2022-06-24 RX ADMIN — HYDROCODONE BITARTRATE AND ACETAMINOPHEN 1 TABLET: 5; 325 TABLET ORAL at 05:57

## 2022-06-24 RX ADMIN — ASPIRIN 325 MG: 325 TABLET ORAL at 09:01

## 2022-06-24 RX ADMIN — INSULIN LISPRO 2 UNITS: 100 INJECTION, SOLUTION INTRAVENOUS; SUBCUTANEOUS at 09:05

## 2022-06-24 RX ADMIN — DULOXETINE HYDROCHLORIDE 30 MG: 30 CAPSULE, DELAYED RELEASE ORAL at 09:00

## 2022-06-24 RX ADMIN — PREGABALIN 50 MG: 50 CAPSULE ORAL at 09:01

## 2022-06-24 RX ADMIN — HYDROCHLOROTHIAZIDE 12.5 MG: 12.5 CAPSULE ORAL at 09:01

## 2022-06-24 RX ADMIN — SODIUM CHLORIDE 75 ML/HR: 9 INJECTION, SOLUTION INTRAVENOUS at 05:58

## 2022-06-24 RX ADMIN — LEVOTHYROXINE SODIUM 112 MCG: 0.11 TABLET ORAL at 09:01

## 2022-06-24 RX ADMIN — INSULIN LISPRO 5 UNITS: 100 INJECTION, SOLUTION INTRAVENOUS; SUBCUTANEOUS at 09:06

## 2022-06-24 RX ADMIN — INSULIN LISPRO 2 UNITS: 100 INJECTION, SOLUTION INTRAVENOUS; SUBCUTANEOUS at 13:24

## 2022-06-24 RX ADMIN — AMLODIPINE BESYLATE 5 MG: 5 TABLET ORAL at 13:23

## 2022-06-24 NOTE — PLAN OF CARE
Goal Outcome Evaluation:           Progress: no change  Outcome Evaluation: pt is alert and oriented, confused, no falls, bed alarm on, room air, BP high, labetalol given PRN, medicated PRN for BLE pain, continue to monitor

## 2022-06-24 NOTE — CASE MANAGEMENT/SOCIAL WORK
Discharge Planning Assessment  [facility]     Patient Name: Halie Guidry  MRN: [id number]  Today's Date: 6/24/2022    Admit Date: 6/22/2022     Discharge Needs Assessment    No documentation.                Discharge Plan     Row Name 06/24/22 1312       Plan    Plan Comments Referrals placed, contacted liasions, awaiting bed availability and pre-cert. Contacted [facility], d/c planned anytime pending placement              Continued Care and Services - Admitted Since 6/22/2022     Destination     Service Provider Request Status Selected Services Address Phone Fax Patient Preferred    [facility] [place]  Pending - Request Sent N/A [address] [facility] [address] [phone] --    [facility]  Pending - [facility] Sent N/A [address] [facility] [address] [phone] --    [facility]  Pending - Request Sent N/A [address] [facility] [address] [phone] --            Selected Continued Care - Episodes Includes selections from active Coordinated Care Management episodes    High Risk Care Management Episode start date: 6/22/2022   There are no active outsourced providers for this episode.                  Demographic Summary    No documentation.                Functional Status    No documentation.                Psychosocial    No documentation.                Abuse/Neglect    No documentation.                Legal    No documentation.                Substance Abuse    No documentation.                Patient Forms    No documentation.                   Vannessa Holt, RN

## 2022-06-24 NOTE — PLAN OF CARE
Goal Outcome Evaluation:  Plan of Care Reviewed With: patient        Progress: improving  Outcome Evaluation: Pt agreeable to OT today, she was able to work on multiple transfers to increase his independence in sit to stand and pivot transfers for ADLs. She is able to stand and pivot this date X2 person assist, she is very forwardly flexed at rwx. She has more confidence this date, however still fearful of falling. She is able to make some progress with dressing skills with max A to don her socks. She is not safe to dc back home and will need SNF at dc.    OT completed hand hygiene and wore appropriate PPE.

## 2022-06-24 NOTE — PLAN OF CARE
Goal Outcome Evaluation:              Pt medically ready for discharge and will be going to [facility] via EMS. Report to be called prior to pt leaving.

## 2022-06-24 NOTE — NURSING NOTE
Attempted to call Jossy Estrada to give report. Left voicemail for someone to call back for report.

## 2022-06-24 NOTE — CASE MANAGEMENT/SOCIAL WORK
Contacted son, Derrick Guidry, and informed that pt was accepted at [facility] and was discharged per ambulance.

## 2022-06-24 NOTE — THERAPY TREATMENT NOTE
Patient Name: Halie Guidry  : 1940    MRN: [id number]                              Today's Date: 2022       Admit Date: 2022    Visit Dx:     ICD-10-CM ICD-9-CM   1. Acute metabolic encephalopathy  G93.41 348.31   2. Weakness of both lower extremities  R29.898 729.89     Patient Active Problem List   Diagnosis   • Compression fracture   • Lumbar degenerative disc disease   • Type 2 diabetes mellitus, with long-term current use of insulin ([facility])   • Hyperlipidemia   • Benign essential hypertension   • Primary hypothyroidism   • Leukocytosis   • Neuropathy   • Osteoporosis   • Proteinuria   • Tobacco abuse   • Diabetic eye exam ([facility])   • Generalized weakness   • Spinal stenosis   • Scoliosis   • Arthritis   • VBI (vertebrobasilar insufficiency)   • Orthostatic hypotension   • 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 ([facility])   • Severe hypothyroidism   • Noncompliance with medication regimen   • Vitamin D deficiency disease   • Altered mental status   • Tremor   • Seizure ([facility])   • Stage 3b chronic kidney disease ([facility])   • Thoracic degenerative disc disease   • Metabolic encephalopathy   • VIPUL (acute kidney injury) ([facility])   • Hyperglycemia   • Type II diabetes mellitus, uncontrolled   • Lower abdominal pain   • Transaminitis   • COVID-19 virus detected   • UTI (urinary tract infection)   • Emphysematous cystitis   • Choledocholithiasis   • Hypokalemia   • Hypoxia   • Generalized abdominal pain   • History of Clostridium difficile infection   • History of ERCP   • Acute UTI (urinary tract infection)   • Hypomagnesemia   • Burning pain   • Anxiety disorder   • Neuropathic pain   • Intertrigo   • Acute metabolic encephalopathy   • Hypoglycemia   • Weakness of both lower extremities     Past Medical History:   Diagnosis Date   • Anxiety    • Arthritis    • Benign essential hypertension 2014   • 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[facility])    • Depression    • Diabetes ([facility])    • Diabetes mellitus, type  2 ([facility])    • Disc degeneration, lumbar    • Headache, tension-type    • Hyperlipidemia    • Hypothyroidism    • Neuropathy    • Osteoporosis 09/09/2015   • Peripheral neuropathy    • Rotator cuff tear, left    • Scoliosis    • Shoulder pain     LEFT, TORN ROTATOR CUFF S/P FALL   • Spinal stenosis      Past Surgical History:   Procedure Laterality Date   • APPENDECTOMY     • BILATERAL BREAST REDUCTION Bilateral 08/2015   • CATARACT EXTRACTION  03/2015   • CHOLECYSTECTOMY WITH INTRAOPERATIVE CHOLANGIOGRAM N/A 3/27/2022    Procedure: CHOLECYSTECTOMY LAPAROSCOPIC INTRAOPERATIVE CHOLANGIOGRAM;  Surgeon: Aiyana Hill MD;  Location: [facility] MAIN OR;  Service: General;  Laterality: N/A;   • COLONOSCOPY  06/05/2015    WNL   • ERCP N/A 2/25/2022    Procedure: ENDOSCOPIC RETROGRADE CHOLANGIOPANCREATOGRAPHY with sphincterotomy and balloon sweep;  Surgeon: Chilo Wilhelm MD;  Location: [facility] ENDOSCOPY;  Service: Gastroenterology;  Laterality: N/A;  PRE/POST - CBD stones   • ERCP N/A 3/28/2022    Procedure: ENDOSCOPIC RETROGRADE CHOLANGIOPANCREATOGRAPHY WITH SPHINCTEROTOMY AND BALLOON SWEEP;  Surgeon: Chilo Wilhelm MD;  Location: [facility] ENDOSCOPY;  Service: Gastroenterology;  Laterality: N/A;  PRE: COMMON DUCT STONE  POST: COMMON DUCT STONE   • KYPHOPLASTY     • REDUCTION MAMMAPLASTY     • TONSILLECTOMY        General Information     Row Name 06/24/22 1400          OT Time and Intention    Document Type therapy note (daily note)  -SM     Mode of Treatment occupational therapy;individual therapy  -     Row Name 06/24/22 1400          General Information    Patient Profile Reviewed yes  -SM     Existing Precautions/Restrictions fall  -SM     Row Name 06/24/22 1400          Cognition    Orientation Status (Cognition) oriented x 4  -SM     Row Name 06/24/22 1400          Safety Issues, Functional Mobility    Impairments Affecting Function (Mobility) balance;endurance/activity tolerance;strength;pain  -SM            User Key  (r) = Recorded By, (t) = Taken By, (c) = Cosigned By    Initials Name Provider Type    Savita Bryant OT Occupational Therapist                 Mobility/ADL's     Row Name 06/24/22 1400          Bed Mobility    Supine-Sit [place] (Bed Mobility) moderate assist (50% patient effort);verbal cues  -     Sit-Supine [place] (Bed Mobility) moderate assist (50% patient effort)  -     Assistive Device (Bed Mobility) head of bed elevated;draw sheet;bed rails  -     Comment, (Bed Mobility) extra time to complete  -SM     Row Name 06/24/22 1400          Transfers    Transfers sit-stand transfer;bed-chair transfer  -     Bed-Chair [place] (Transfers) 2 person assist;moderate assist (50% patient effort);verbal cues  -     Sit-Stand [place] (Transfers) moderate assist (50% patient effort);2 person assist;verbal cues  X3 reps. On 4th rep pt was able to stand up CGAX2 from raised surface of chair  -SM     Row Name 06/24/22 1400          Sit-Stand Transfer    Assistive Device (Sit-Stand Transfers) walker, 4-wheeled  -SM     Row Name 06/24/22 1400          Functional Mobility    Functional Mobility- Ind. Level minimum assist (75% patient effort);2 person assist required  -     Functional Mobility-Distance (Feet) --  3+3  -     Functional Mobility- Comment side steps up HOB, very forward flexed posture  -[facility] Name 06/24/22 1400          Lower Body Dressing Assessment/Training    [place] Level (Lower Body Dressing) lower body dressing skills;don;socks;maximum assist (25% patient effort)  -     Position (Lower Body Dressing) supported sitting  -           User Key  (r) = Recorded By, (t) = Taken By, (c) = Cosigned By    Initials Name Provider Type     Savita Armenta OT Occupational Therapist               Obj/Interventions     [facility] Name 06/24/22 1402          Motor Skills    Functional Endurance fair, pt able to tolerate increased transfers today  -SM     Row Name  "06/24/22 1402          Balance    Balance Assessment standing static balance;standing dynamic balance  -     Static Sitting Balance standby assist  -     Static Standing Balance 2-person assist  -     Dynamic Standing Balance 2-person assist  -     Position/Device Used, Standing Balance supported;walker, rolling  -           User Key  (r) = Recorded By, (t) = Taken By, (c) = Cosigned By    Initials Name Provider Type     Savita Armenta, TOMÁS Occupational Therapist               Goals/Plan    No documentation.                Clinical Impression     Row Name 06/24/22 1403          Pain Assessment    Posttreatment Pain Rating 8/10  -     Pain Location - Side/Orientation Left  -     Pain Location - knee  -     Pre/Posttreatment Pain Comment reports \"still really sore from being on the ground\"  -     Row Name 06/24/22 1403          Plan of Care Review    Plan of Care Reviewed With patient  -     Progress improving  -     Outcome Evaluation Pt agreeable to OT today, she was able to work on multiple transfers to increase his independence in sit to stand and pivot transfers for ADLs. She is able to stand and pivot this date X2 person assist, she is very forwardly flexed at rwx. She has more confidence this date, however still fearful of falling. She is able to make some progress with dressing skills with max A to don her socks. She is not safe to dc back home and will need SNF at [place].  -     Row Name 06/24/22 1403          Therapy Plan Review/Discharge Plan (OT)    Anticipated Discharge Disposition (OT) skilled nursing facility  -     Row Name 06/24/22 1403          Positioning and Restraints    Pre-Treatment Position in bed  -     Post Treatment Position bed  -     In Bed fowlers;call light within reach;encouraged to call for assist;exit alarm on;notified nsg  -           User Key  (r) = Recorded By, (t) = Taken By, (c) = Cosigned By    Initials Name Provider Type    SM Savita Armenta, " OT Occupational Therapist               Outcome Measures     Row Name 06/24/22 1405          How much help from another is currently needed...    Putting on and taking off regular lower body clothing? 1  -SM     Bathing (including washing, rinsing, and drying) 2  -SM     Toileting (which includes using toilet bed pan or urinal) 1  -SM     Putting on and taking off regular upper body clothing 2  -SM     Taking care of personal grooming (such as brushing teeth) 3  -SM     Eating meals 4  -SM     AM-PAC 6 Clicks Score (OT) 13  -SM     Row Name 06/24/22 1405          Functional Assessment    Outcome Measure Options AM-PAC 6 Clicks Daily Activity (OT)  -SM           User Key  (r) = Recorded By, (t) = Taken By, (c) = Cosigned By    Initials Name Provider Type    SM Savita Armenta OT Occupational Therapist                Occupational Therapy Education                 Title: PT OT SLP Therapies (In Progress)     Topic: Occupational Therapy (Done)     Point: ADL training (Done)     Description:   Instruct learner(s) on proper safety adaptation and remediation techniques during self care or transfers.   Instruct in proper use of assistive devices.              Learning Progress Summary           Patient Acceptance, E, VU,NR by VS at 6/23/2022 1235    Comment: OT discussed the goals and the POC with the pt.                   Point: Home exercise program (Done)     Description:   Instruct learner(s) on appropriate technique for monitoring, assisting and/or progressing therapeutic exercises/activities.              Learning Progress Summary           Patient Acceptance, E, VU,NR by VS at 6/23/2022 1235    Comment: OT discussed the goals and the POC with the pt.                               User Key     Initials Effective Dates Name Provider Type Discipline    VS 06/16/21 -  Geraldine Almonte OTR Occupational Therapist OT              OT Recommendation and Plan     Plan of Care Review  Plan of Care Reviewed With:  patient  Progress: improving  Outcome Evaluation: Pt agreeable to OT today, she was able to work on multiple transfers to increase his independence in sit to stand and pivot transfers for ADLs. She is able to stand and pivot this date X2 person assist, she is very forwardly flexed at rwx. She has more confidence this date, however still fearful of falling. She is able to make some progress with dressing skills with max A to don her socks. She is not safe to dc back home and [place] need SNF [place].     Time Calculation:    Time Calculation- OT     Row Name 06/24/22 1406             Time Calculation- OT    OT Start Time 1249  -SM      OT Stop Time 1308  -SM      OT Time Calculation (min) 19 min  -SM      Total Timed Code Minutes- OT 19 minute(s)  -SM      OT Received On 06/24/22  -      OT - Next Appointment 06/27/22  -              Timed Charges    33200 - OT Therapeutic Activity Minutes 19  -SM              Total Minutes    Timed Charges Total Minutes 19  -SM       Total Minutes 19  -SM            User Key  (r) = Recorded By, (t) = Taken By, (c) = Cosigned By    Initials Name Provider Type     Savita Armenta OT Occupational Therapist              Therapy Charges for Today     Code Description Service Date Service Provider Modifiers Qty    85359516168 HC OT THERAPEUTIC ACT EA 15 MIN 6/24/2022 Savita Armenta OT GO 1    54393161652 HC OT THER SUPP EA 15 MIN 6/24/2022 Savita Armenta OT GO 1               Savita Armenta OT  6/24/2022

## 2022-06-24 NOTE — DISCHARGE SUMMARY
[facility] Medicine Services  DISCHARGE SUMMARY    Patient Name: Halie Guidry  : 1940  MRN: [id number]    Date of Admission: 2022  9:10 PM  Date of Discharge: 2022   primary Care Physician: Napoleon Mead MD    Consults     Date and Time Order Name Status Description    2022 11:33 PM Inpatient Neurology Consult Stroke Completed     2022 10:06 AM Inpatient Psychiatrist Consult Completed           Hospital Course       Active Hospital Problems    Diagnosis  POA   • Weakness of both lower extremities [R29.898]  Yes   • Anxiety disorder [F41.9]  Yes   • Neuropathic pain [M79.2]  Yes   • Noncompliance with medication regimen [Z91.14]  Not Applicable   • Type 2 diabetes mellitus, with long-term current use of insulin (HCC) [E11.9, Z79.4]  Not Applicable   • Primary hypothyroidism [E03.9]  Yes   • Benign essential hypertension [I10]  Yes   • Lumbar degenerative disc disease [M51.36]  Yes   • Stage 3b chronic kidney disease (HCC) [N18.32]  Yes      Resolved Hospital Problems    Diagnosis Date Resolved POA   • **Hypoglycemia [E16.2] 2022 Yes   • Acute metabolic encephalopathy [G93.41] 2022 Yes          Hospital Course:  Halie Guidry is a 82 y.o. female presents the hospital with hypoglycemia, bilateral lower extremity weakness, lumbar spinal stenosis, and known diabetic neuropathy.     Hypoglycemia with type 2 diabetes mellitus:  Glucose carefully monitored and adjusted.  Patient is requiring much less insulin than she has been taking at home.  Perhaps she is eating much more sugar at home and then requiring substantially higher insulin.  Recommend regimen per below.  Monitor glucose 4 times daily.  Adjust insulin as needed.  Careful diabetic diet.  Close primary care follow-up within 1 week after discharge from skilled facility.  No further hypoglycemia has been noted.     Bilateral lower extremity weakness following a fall:  Neurology following.  Likely  "multifactorial.  Patient with lumbar spinal stenosis.  MRI reviewed and no new acute concerning findings.  Concern is that with patient's fall she may have had exacerbation of lumbar symptoms.  Neurology recommends a trial of outpatient physical therapy with follow-up in neurology office.  If patient fails to improve can be seen and can consider additional outpatient work-up.  Thus far improving slowly.  Please see full neurology recommendations below.     Acute on chronic neuropathic pain:  Continue Lyrica and pain regimen.  Improving slowly.  Patient reports she had stopped taking her pain regimen acutely and her pain became much worse prior to her fall.  Recommend compliance with current regimen and if she decides to stop would recommend to gradually taper off.     Stage III chronic kidney disease:  Monitor renal function intermittently.       Chronic lacunar stroke:  Seen on MRI of brain.  Aspirin and statin.     Borderline B12 deficiency: Replacement initiated.    At the time of discharge patient was told to take all medications as prescribed, keep all follow-up appointments, and call their doctor or return to the hospital with any worsening or concerning symptoms.    Please note that this note was made using Dragon voice recognition software         Impression and recommendations per neurology consult team:  \"Impression: This is an 82-year-old female with history of hypertension, diabetes mellitus, diabetic peripheral neuropathy, lumbar spinal stenosis and hypothyroidism who presented to [facility] 6/22 in setting of low blood sugar resulting in fall and striking her head.  Patient originally presented with confusion and bilateral lower extremity weakness for which our service was consulted.  Confusion resolved fairly quickly and bilateral lower extremity weakness persisted.  At baseline patient uses walker to ambulate and has for some time-greater than 2 years.  Patient had CT of the head done " "6/21-day prior to arrival which shows evidence of moderate extensive small vessel chronic ischemic changes with no other acute intracranial abnormalities.  CT the head here 6/22 showed age-related atrophy and small vessel disease of aging, evidence of chronic lacunar infarcts, no acute intracranial pathology otherwise.  The cervical spine was ordered by our service which showed moderate to severe compression fractures-unchanged since 2017.  No evidence of new/acute compression fractures/compression deformity or disc herniation.  Evidence of fusion of L2/L3.  Evidence of multilevel degenerative disease involving lumbar spine.  No obvious findings to support lower extremity weakness.  Will recommend aggressive outpatient physical therapy and follow-up with neurology office if weakness fails to improve and/or worsens.No further neurology workup indicated. We will sign off and see again upon request.   Diagnosis:  Paraparesis bilateral lower extremities above baseline  Lumbar spinal stenosis  Hypoglycemia  Diabetic peripheral neuropathy  History of chronic lacunar infarct  Uses wheelchair to assist with mobility   Plan:  Recommend outpatient physical therapy; if weakness does not improve/deconditioning progresses would recommend consideration of MRI of the brain with and without contrast  Follow-up with neurology as needed\"      Day of Discharge     HPI:     Hypoglycemia and leg weakness     HPI:  Patient feels better.  Still complains of some bilateral leg pain and weakness.  She describes the pain as burning.  She thinks she will need time in rehab.       Vital Signs:   Temp:  [97.7 °F (36.5 °C)-98.4 °F (36.9 °C)] 98.4 °F (36.9 °C)  Heart Rate:  [66-72] 72  Resp:  [16] 16  BP: (162-196)/(64-80) 166/71     Physical Exam:    Constitutional:Awake, alert, frail  HENT: NCAT, mucous membranes moist, neck supple  Respiratory: Clear to auscultation bilaterally, respiratory effort normal, nonlabored breathing   Cardiovascular: " RRR, normal radial pulses  Gastrointestinal:   positive bowel sounds, soft, nontender, nondistended  Musculoskeletal: Bilateral lower extremity weakness, elderly frail and chronically debilitated in appearance, BMI is 29, Normal musculature for age, no lower extremity edema  Psychiatric: Somewhat anxious affect, cooperative, conversational  Neurologic: No slurred speech or facial droop, follows commands  Skin: No rashes or jaundice, warm    Pertinent  and/or Most Recent Results     Results from last 7 days   Lab Units 06/24/22  0425 06/23/22  0425 06/22/22  2140 06/21/22  2041   WBC 10*3/mm3 10.39 9.94 11.52* 13.60*   HEMOGLOBIN g/dL 10.7* 10.9* 12.1 12.4   HEMATOCRIT % 31.0* 32.6* 36.2 37.5   PLATELETS 10*3/mm3 248 253 250 312   SODIUM mmol/L 139 138 139 138   POTASSIUM mmol/L 3.8 3.9 4.2 3.6   CHLORIDE mmol/L 104 103 102 101   CO2 mmol/L 24.0 23.0 24.0 26.5   BUN mg/dL 17 23 23 16   CREATININE mg/dL 0.84 1.05* 1.16* 1.08*   GLUCOSE mg/dL 137* 234* 126* 91   CALCIUM mg/dL 8.7 8.9 9.2 9.5     Results from last 7 days   Lab Units 06/23/22 0425 06/22/22  2140   BILIRUBIN mg/dL 0.5 0.6   ALK PHOS U/L 88 100   ALT (SGPT) U/L 7 9   AST (SGOT) U/L 12 12     Results from last 7 days   Lab Units 06/23/22  0425   CHOLESTEROL mg/dL 120   TRIGLYCERIDES mg/dL 127   HDL CHOL mg/dL 34*     Results from last 7 days   Lab Units 06/23/22  0425   TSH uIU/mL 3.870   HEMOGLOBIN A1C % 7.60*  7.60*       Brief Urine Lab Results  (Last result in the past 365 days)      Color   Clarity   Blood   Leuk Est   Nitrite   Protein   CREAT   Urine HCG        06/22/22 2156 Yellow   Clear   Negative   Negative   Negative   >=300 mg/dL (3+)                 Microbiology Results Abnormal     Procedure Component Value - Date/Time    COVID PRE-OP / PRE-PROCEDURE SCREENING ORDER (NO ISOLATION) - Swab, Nasopharynx [[id number]]  (Normal) Collected: 06/22/22 2255    Lab Status: Final result Specimen: Swab from Nasopharynx Updated: 06/22/22 2720     Narrative:      The following orders were created for panel order COVID PRE-OP / PRE-PROCEDURE SCREENING ORDER (NO ISOLATION) - Swab, Nasopharynx.  Procedure                               Abnormality         Status                     ---------                               -----------         ------                     COVID-19,[facility] IN-HOUSE...[[id number]]  Normal              Final result                 Please view results for these tests on the individual orders.    COVID-19,[facility] IN-HOUSE CEPHEID/HATTIE NP SWAB IN TRANSPORT MEDIA 8-12 HR TAT - Swab, Nasopharynx [[id number]]  (Normal) Collected: 06/22/22 2255    Lab Status: Final result Specimen: Swab from Nasopharynx Updated: 06/22/22 0205     COVID19 Not Detected    Narrative:      Fact sheet for providers: https://www.fda.gov/media/405871/download     Fact sheet for patients: https://www.fda.gov/media/409301/download          Imaging Results (All)     Procedure Component Value Units Date/Time    MRI Lumbar Spine Without Contrast [[id number]] Collected: 06/23/22 1601     Updated: 06/23/22 1656    Narrative:      MRI LUMBAR SPINE WITHOUT CONTRAST     HISTORY: Back pain, left radiculopathy.     COMPARISON: MRI lumbar spine 05/28/2017.     FINDINGS:  There is moderate to severe loss of vertical height at L3  where the patient has been treated with kyphoplasty. There is no  evidence of marrow edema to suggest a new fracture. The conus is at  L1-L2.     L1-L2: A mild broad-based disc osteophyte complex is present with no  evidence of herniation.     L2-L3: A mild broad-based disc osteophyte complex is present which  results in mild flattening of the ventral surface of the thecal sac.  There is approximately 2 mm of retropulsion of the superior endplate of  L2. There is no evidence of herniation or foraminal stenosis. There has  been interval fusion of the prominent anterior osteophytes at L2-L3.     L3-L4: A mild broad-based disc osteophyte complex is present with  no  evidence of herniation.     L4-L5: Mild facet degenerative disease is present bilaterally. A  broad-based disc osteophyte complex is present with no evidence of  herniation. Mild foraminal stenosis is present bilaterally secondary to  loss of disc height and extension of disc osteophyte complex into the  neural foramen.     L5-S1: Mild-to-moderate facet degenerative disease is present  bilaterally. There is no evidence of disc bulge or herniation.       Impression:      There is a moderate to severe compression fracture involving  L3 unchanged versus 2017. There is no evidence of a new compression  fracture, compression deformity or of disc herniation. There has been  interval fusion of the anterior osteophytes at L2-L3. Multilevel  degenerative disease involving the lumbar spine is noted as described  above, similar in appearance compared to the prior study. See above.     This report was finalized on 6/23/2022 4:53 PM by Dr. Nolan Melgar M.D.       CT Head Without Contrast [[id number]] Collected: 06/22/22 2312     Updated: 06/22/22 2312    Narrative:        Patient: TABATHA CARTER  Time Out: 23:11  Exam(s): CT HEAD Without Contrast     EXAM:    CT Head Without Intravenous Contrast    CLINICAL HISTORY:     Reason for exam: ams, unable to ambulate.    TECHNIQUE:    Axial computed tomography images of the head brain without intravenous   contrast.  CTDI is 54.8 mGy and DLP is 1007.3 mGy-cm.  This CT exam was   performed according to the principle of ALARA (As Low As Reasonably   Achievable) by using one or more of the following dose reduction   techniques: automated exposure control, adjustment of the mA and or kV   according to patient size, and or use of iterative reconstruction   technique.    COMPARISON:    6 21 2022.    FINDINGS:    Brain:  Chronic lacunar infarcts basal ganglia bilaterally.  No   abnormal extra-axial collection is noted.  No hemorrhage.    Midline shift:  Midline anatomy is  unremarkable.    Ventricles:  There is prominence of the ventricular system, cortical   sulci, basilar cisterns, compatible with age related atrophy.    Bones joints:  Calvarium is within normal limits.  No acute fracture.    Soft tissues:  Unremarkable.    Sinuses:  Visualized sinuses are unremarkable.    Mastoid air cells:  Mastoid air cells are well pneumatized.    IMPRESSION:       1.  Age-related atrophy and small vessel disease of aging.  2.  Chronic lacunar infarcts.  3.  No acute intracranial pathology.  4.  If there is concern for etiology such as early acute lacunar infarcts,   MRI imaging of the brain with diffusion-weighted sequences should be   performed.      Impression:          Electronically signed by Vadim Galaviz MD on 06-22-22 at 2311          Results for orders placed during the hospital encounter of 12/01/21    Duplex Venous Lower Extremity - Bilateral CAR    Interpretation Summary  · Normal bilateral lower extremity venous duplex scan.      Results for orders placed during the hospital encounter of 12/01/21    Duplex Venous Lower Extremity - Bilateral CAR    Interpretation Summary  · Normal bilateral lower extremity venous duplex scan.      Results for orders placed during the hospital encounter of 06/22/22    Adult transthoracic echo complete    Interpretation Summary  · Left ventricular wall thickness is consistent with mild concentric hypertrophy.  · Calculated left ventricular EF = 68% Estimated left ventricular EF was in agreement with the calculated left ventricular EF. Left ventricular systolic function is normal.  · Normal right ventricular cavity size and systolic function noted.  · The left atrial cavity is mildly dilated  · Saline test results are negative.  · Mild mitral valve regurgitation is present.  · There is no evidence of pericardial effusion         Discharge Details        Discharge Medications      New Medications      Instructions Start Date   amLODIPine 5 MG  tablet  Commonly known as: NORVASC   5 mg, Oral, Every 24 Hours Scheduled   Start Date: June 25, 2022     aspirin 81 MG EC tablet   81 mg, Oral, Daily   Start Date: June 25, 2022     cyanocobalamin 1000 MCG tablet  Commonly known as: VITAMIN B-12   1,000 mcg, Oral, Daily   Start Date: June 25, 2022        Changes to Medications      Instructions Start Date   Insulin Lispro (1 Unit Dial) 100 UNIT/ML solution pen-injector  Commonly known as: HUMALOG  What changed: how much to take   6 Units, Subcutaneous, 3 Times Daily With Meals      lisinopril 20 MG tablet  Commonly known as: PRINIVILZESTRIL  What changed: how much to take   20 mg, Oral, Daily      Toujeo SoloStar 300 UNIT/ML solution pen-injector injection  Generic drug: Insulin Glargine (1 Unit Dial)  What changed: See the new instructions.   18 Units, Subcutaneous, Nightly         Continue These Medications      Instructions Start Date   acetaminophen 325 MG tablet  Commonly known as: TYLENOL   650 mg, Oral, Every 6 Hours PRN      atorvastatin 80 MG tablet  Commonly known as: LIPITOR   TAKE 1 TABLET EVERY NIGHT      bisoprolol 5 MG tablet  Commonly known as: ZEBeta   2.5 mg, Oral, Daily      DULoxetine 30 MG capsule  Commonly known as: CYMBALTA   30 mg, Oral, Daily      famotidine 20 MG tablet  Commonly known as: PEPCID   20 mg, Oral, Daily Before Supper      hydroCHLOROthiazide 12.5 MG capsule  Commonly known as: MICROZIDE   12.5 mg, Oral, Daily      HYDROcodone-acetaminophen 5-325 MG per tablet  Commonly known as: NORCO   1 tablet, Oral, Every 6 Hours PRN      hydrocortisone-zinc oxide-bacitracin-nystatin cream   1 application, Topical, 2 Times Daily PRN      Insulin Pen Needle 32G X 4 MM misc   Use to inject insulin 4 TIMES DAILY      levothyroxine 112 MCG tablet  Commonly known as: SYNTHROID, LEVOTHROID   112 mcg, Oral, Daily      pregabalin 50 MG capsule  Commonly known as: LYRICA   50 mg, Oral, 3 Times Daily      Trulicity 1.5 MG/0.5ML solution  pen-injector  Generic drug: Dulaglutide   1.5 mg, Subcutaneous, Weekly             Prescriptions of Lyrica and hydrocodone sent to skilled facility pharmacy      Allergies   Allergen Reactions   • Sulfa Antibiotics Unknown - High Severity     Pt says it was a long time ago and I don't remember but it wasn't good.          Discharge Disposition:  Skilled Nursing Facility (DC - External)    Diet:  Hospital:  Diet Order   Procedures   • Diet Regular; Thin       Activity:  Activity Instructions     Up WIth Assist                 CODE STATUS:    Code Status and Medical Interventions:   Ordered at: 06/22/22 0387     Code Status (Patient has no pulse and is not breathing):    CPR (Attempt to Resuscitate)     Medical Interventions (Patient has pulse or is breathing):    Full Support       Future Appointments   Date Time Provider Department Center   10/26/2022  1:00 PM [facility]   11/9/2022  1:00 PM Kenya Pruett APRN [facility]   4/10/2023  2:00 PM Solomon Barrow MD [facility]           Additional Instructions for the Follow-ups that You Need to Schedule     Discharge Follow-up with PCP   As directed       Currently Documented PCP:    Napoleon Mead MD    PCP Phone Number:    [phone]     Follow Up Details: 1 week after discharge from skilled facility         Discharge Follow-up with Specified Provider: Follow-up in [facility] in 4 to 8 weeks for leg weakness   As directed      To: Follow-up in [facility] clinic in 4 to 8 weeks for leg weakness            Follow-up Information     Napoleon Mead MD .    Specialty: Family Medicine  Why: 1 week after discharge from skilled facility  Contact information:  [id number] [address]  [phone]                             Nolan Upton MD  06/24/22      Time Spent on Discharge:  I spent 40 minutes on this discharge activity which included: face-to-face encounter with the  patient, reviewing the data in the system, coordination of the care with the nursing staff as well as consultants, documentation, and entering orders.

## 2022-06-24 NOTE — PROGRESS NOTES
[facility] Medicine Services  PROGRESS NOTE    Patient Name: Halie Guidry  : 1940  MRN: [id number]    Date of Admission: 2022  Primary Care Physician: Napoleon Mead MD    Subjective   Subjective     CC:  Hypoglycemia and leg weakness    HPI:  Patient feels better.  Still complains of some bilateral leg pain and weakness.  She describes the pain as burning.  She thinks she will need time in rehab.    Review of Systems  No current fevers or chills  No current shortness of breath or cough  No current nausea, vomiting, or diarrhea  No current chest pain or palpitations      Objective   Objective     Vital Signs:   Temp:  [97.1 °F (36.2 °C)-98.4 °F (36.9 °C)] 98.4 °F (36.9 °C)  Heart Rate:  [66-73] 72  Resp:  [16] 16  BP: (162-209)/(53-81) 192/68  Total (NIH Stroke Scale): 5     Physical Exam:  Constitutional:Awake, alert, frail  HENT: NCAT, mucous membranes moist, neck supple  Respiratory: Clear to auscultation bilaterally, respiratory effort normal, nonlabored breathing   Cardiovascular: RRR, normal radial pulses  Gastrointestinal:   positive bowel sounds, soft, nontender, nondistended  Musculoskeletal: Bilateral lower extremity weakness, elderly frail and chronically debilitated in appearance, BMI is 29, Normal musculature for age, no lower extremity edema  Psychiatric: Somewhat anxious affect, cooperative, conversational  Neurologic: No slurred speech or facial droop, follows commands  Skin: No rashes or jaundice, warm      Results Reviewed:  Results from last 7 days   Lab Units 22  2140   WBC 10*3/mm3 10.39 9.94 11.52*   HEMOGLOBIN g/dL 10.7* 10.9* 12.1   HEMATOCRIT % 31.0* 32.6* 36.2   PLATELETS 10*3/mm3 248 253 250     Results from last 7 days   Lab Units 22  04222  2140   SODIUM mmol/L 139 138 139   POTASSIUM mmol/L 3.8 3.9 4.2   CHLORIDE mmol/L 104 103 102   CO2 mmol/L 24.0 23.0 24.0   BUN mg/dL 17 23 23   CREATININE  mg/dL 0.84 1.05* 1.16*   GLUCOSE mg/dL 137* 234* 126*   CALCIUM mg/dL 8.7 8.9 9.2   ALT (SGPT) U/L  --  7 9   AST (SGOT) U/L  --  12 12     Estimated Creatinine Clearance: 59.1 mL/min (by C-G formula based on SCr of 0.84 mg/dL).    Microbiology Results Abnormal     Procedure Component Value - Date/Time    COVID PRE-OP / PRE-PROCEDURE SCREENING ORDER (NO ISOLATION) - Swab, Nasopharynx [[id number]]  (Normal) Collected: 06/22/22 2255    Lab Status: Final result Specimen: Swab from Nasopharynx Updated: 06/22/22 2348    Narrative:      The following orders were created for panel order COVID PRE-OP / PRE-PROCEDURE SCREENING ORDER (NO ISOLATION) - Swab, Nasopharynx.  Procedure                               Abnormality         Status                     ---------                               -----------         ------                     COVID-19,[facility] NAY IN-HOUSE...[[id number]]  Normal              Final result                 Please view results for these tests on the individual orders.    COVID-19,[facility] NAY IN-HOUSE CEPHEID/HATTIE NP SWAB IN TRANSPORT MEDIA 8-12 HR TAT - Swab, Nasopharynx [[id number]]  (Normal) Collected: 06/22/22 2255    Lab Status: Final result Specimen: Swab from Nasopharynx Updated: 06/22/22 2348     COVID19 Not Detected    Narrative:      Fact sheet for providers: https://www.fda.gov/media/602954/download     Fact sheet for patients: https://www.fda.gov/media/549541/download          Imaging Results (Last 24 Hours)     Procedure Component Value Units Date/Time    MRI Lumbar Spine Without Contrast [[id number]] Collected: 06/23/22 1601     Updated: 06/23/22 1656    Narrative:      MRI LUMBAR SPINE WITHOUT CONTRAST     HISTORY: Back pain, left radiculopathy.     COMPARISON: MRI lumbar spine 05/28/2017.     FINDINGS:  There is moderate to severe loss of vertical height at L3  where the patient has been treated with kyphoplasty. There is no  evidence of marrow edema to suggest a new fracture. The conus is  at  L1-L2.     L1-L2: A mild broad-based disc osteophyte complex is present with no  evidence of herniation.     L2-L3: A mild broad-based disc osteophyte complex is present which  results in mild flattening of the ventral surface of the thecal sac.  There is approximately 2 mm of retropulsion of the superior endplate of  L2. There is no evidence of herniation or foraminal stenosis. There has  been interval fusion of the prominent anterior osteophytes at L2-L3.     L3-L4: A mild broad-based disc osteophyte complex is present with no  evidence of herniation.     L4-L5: Mild facet degenerative disease is present bilaterally. A  broad-based disc osteophyte complex is present with no evidence of  herniation. Mild foraminal stenosis is present bilaterally secondary to  loss of disc height and extension of disc osteophyte complex into the  neural foramen.     L5-S1: Mild-to-moderate facet degenerative disease is present  bilaterally. There is no evidence of disc bulge or herniation.       Impression:      There is a moderate to severe compression fracture involving  L3 unchanged versus 2017. There is no evidence of a new compression  fracture, compression deformity or of disc herniation. There has been  interval fusion of the anterior osteophytes at L2-L3. Multilevel  degenerative disease involving the lumbar spine is noted as described  above, similar in appearance compared to the prior study. See above.     This report was finalized on 6/23/2022 4:53 PM by Dr. Nolan Melgar M.D.             Results for orders placed during the hospital encounter of 06/22/22    Adult transthoracic echo complete    Interpretation Summary  · Left ventricular wall thickness is consistent with mild concentric hypertrophy.  · Calculated left ventricular EF = 68% Estimated left ventricular EF was in agreement with the calculated left ventricular EF. Left ventricular systolic function is normal.  · Normal right ventricular cavity size and  systolic function noted.  · The left atrial cavity is mildly dilated  · Saline test results are negative.  · Mild mitral valve regurgitation is present.  · There is no evidence of pericardial effusion      I have reviewed the medications:  Scheduled Meds:aspirin, 325 mg, Oral, Daily   Or  aspirin, 300 mg, Rectal, Daily  atorvastatin, 80 mg, Oral, Nightly  bisoprolol, 2.5 mg, Oral, Daily  cyanocobalamin, 1,000 mcg, Intramuscular, Once  DULoxetine, 30 mg, Oral, Daily  famotidine, 20 mg, Oral, Daily Before Supper  hydroCHLOROthiazide, 12.5 mg, Oral, Daily  insulin glargine, 15 Units, Subcutaneous, Nightly  insulin lispro, 0-9 Units, Subcutaneous, 4x Daily With Meals & Nightly  insulin lispro, 5 Units, Subcutaneous, TID With Meals  levothyroxine, 112 mcg, Oral, Daily  lisinopril, 10 mg, Oral, Daily  pregabalin, 50 mg, Oral, TID  [START ON 6/25/2022] vitamin B-12, 1,000 mcg, Oral, Daily      Continuous Infusions:   PRN Meds:.•  acetaminophen **OR** acetaminophen  •  bisacodyl  •  dextrose  •  dextrose  •  glucagon (human recombinant)  •  HYDROcodone-acetaminophen  •  labetalol  •  ondansetron  •  [COMPLETED] Insert peripheral IV **AND** sodium chloride    Assessment & Plan   Assessment & Plan     Active Hospital Problems    Diagnosis  POA   • **Hypoglycemia [E16.2]  Yes   • Weakness of both lower extremities [R29.898]  Yes   • Acute metabolic encephalopathy [G93.41]  Yes   • Anxiety disorder [F41.9]  Yes   • Neuropathic pain [M79.2]  Yes   • Noncompliance with medication regimen [Z91.14]  Not Applicable   • Type 2 diabetes mellitus, with long-term current use of insulin (HCC) [E11.9, Z79.4]  Not Applicable   • Primary hypothyroidism [E03.9]  Yes   • Benign essential hypertension [I10]  Yes   • Lumbar degenerative disc disease [M51.36]  Yes   • Stage 3b chronic kidney disease (HCC) [N18.32]  Yes      Resolved Hospital Problems   No resolved problems to display.        Brief Hospital Course to date:  Halie Guidry is  a 82 y.o. female presents the hospital with hypoglycemia, bilateral lower extremity weakness, lumbar spinal stenosis, and known diabetic neuropathy.    Hypoglycemia with type 2 diabetes mellitus:  Glucose reviewed and acceptable.  Continue insulin regimen and adjust as needed.  Insulin adjusted today.  Likely needs decreased insulin at discharge.    Bilateral lower extremity weakness following a fall:  Neurology following.  Likely multifactorial.  Patient with lumbar spinal stenosis.  MRI reviewed.  Continue PT OT.    Acute on chronic neuropathic pain:  Continue Lyrica and pain regimen.  Improving slowly.    Stage III chronic kidney disease:  Monitor renal function intermittently.      Chronic lacunar stroke:  Seen on MRI of brain.  Aspirin and statin.    Borderline B12 deficiency: Replacement.    DVT Prophylaxis: Mechanical      Disposition: I expect the patient to be discharged skilled facility when possible    CODE STATUS:   Code Status and Medical Interventions:   Ordered at: 06/22/22 5559     Code Status (Patient has no pulse and is not breathing):    CPR (Attempt to Resuscitate)     Medical Interventions (Patient has pulse or is breathing):    Full Support       Nolan Upton MD  06/24/22

## 2022-06-24 NOTE — PROGRESS NOTES
"DOS: 2022  NAME: Halie Guidry   : 1940  PCP: Napoleon Mead MD  Chief Complaint   Patient presents with   • Gait Problem   • Altered Mental Status     Patient seen in follow-up today; new to me      Neurology     Subjective: No acute events overnight.  Patient denies any new complaints or concerns; she denies any specific worsening of weakness although bilateral lower extremities remain weak.  Patient reports she used walker at baseline to ambulate    No family at bedside    Objective:  Vital signs: BP (!) 192/68 (BP Location: Left arm, Patient Position: Lying)   Pulse 72   Temp 98.4 °F (36.9 °C) (Oral)   Resp 16   Ht 172.7 cm (68\")   Wt 85.3 kg (188 lb)   SpO2 93%   BMI 28.59 kg/m²       HEENT: Normocephalic, atraumatic   COR: RRR  Resp: Even and unlabored  Extremities: Equal pulses, nondistal embolization    Neurological:   MS: AO. Language normal. No neglect. Higher integrative function normal  CN: II-XII normal  Motor: 5/5, normal tone except bilateral lower extremities 3+/5  Reflexes: Absent bilateral lower extremities  Sensory: Intact-to light touch pain upper extremities-bilateral upper extremities; diminished to light touch and vibration below lower extremities  Coordination: Normal-finger-to-nose    Laboratory results:  Lab Results   Component Value Date    GLUCOSE 137 (H) 2022    CALCIUM 8.7 2022     2022    K 3.8 2022    CO2 24.0 2022     2022    BUN 17 2022    CREATININE 0.84 2022    EGFRIFAFRI 50 (L) 2021    EGFRIFNONA 51 (L) 2022    BCR 20.2 2022    ANIONGAP 11.0 2022     Lab Results   Component Value Date    WBC 10.39 2022    HGB 10.7 (L) 2022    HCT 31.0 (L) 2022    MCV 84.5 2022     2022     Lab Results   Component Value Date    CHOL 120 2022    CHOL 238 (H) 2017    CHOL 283 (H) 2017     Lab Results   Component Value Date    HDL 34 (L) " 06/23/2022    HDL 37 (L) 06/08/2021    HDL 41 06/18/2020     Lab Results   Component Value Date    LDL 63 06/23/2022    LDL 68 06/08/2021    LDL 59 06/18/2020     Lab Results   Component Value Date    TRIG 127 06/23/2022    TRIG 139 06/08/2021    TRIG 97 06/18/2020         Lab 06/23/22  0425   HEMOGLOBIN A1C 7.60*  7.60*      Review and interpretation of imaging:  Interpretation Summary    · Left ventricular wall thickness is consistent with mild concentric hypertrophy.  · Calculated left ventricular EF = 68% Estimated left ventricular EF was in agreement with the calculated left ventricular EF. Left ventricular systolic function is normal.  · Normal right ventricular cavity size and systolic function noted.  · The left atrial cavity is mildly dilated  · Saline test results are negative.  · Mild mitral valve regurgitation is present.  · There is no evidence of pericardial effusion     MRI LUMBAR SPINE WITHOUT CONTRAST     HISTORY: Back pain, left radiculopathy.     COMPARISON: MRI lumbar spine 05/28/2017.     FINDINGS:  There is moderate to severe loss of vertical height at L3  where the patient has been treated with kyphoplasty. There is no  evidence of marrow edema to suggest a new fracture. The conus is at  L1-L2.     L1-L2: A mild broad-based disc osteophyte complex is present with no  evidence of herniation.     L2-L3: A mild broad-based disc osteophyte complex is present which  results in mild flattening of the ventral surface of the thecal sac.  There is approximately 2 mm of retropulsion of the superior endplate of  L2. There is no evidence of herniation or foraminal stenosis. There has  been interval fusion of the prominent anterior osteophytes at L2-L3.     L3-L4: A mild broad-based disc osteophyte complex is present with no  evidence of herniation.     L4-L5: Mild facet degenerative disease is present bilaterally. A  broad-based disc osteophyte complex is present with no evidence of  herniation. Mild  foraminal stenosis is present bilaterally secondary to  loss of disc height and extension of disc osteophyte complex into the  neural foramen.     L5-S1: Mild-to-moderate facet degenerative disease is present  bilaterally. There is no evidence of disc bulge or herniation.     IMPRESSION:  There is a moderate to severe compression fracture involving  L3 unchanged versus 2017. There is no evidence of a new compression  fracture, compression deformity or of disc herniation. There has been  interval fusion of the anterior osteophytes at L2-L3. Multilevel  degenerative disease involving the lumbar spine is noted as described  above, similar in appearance compared to the prior study. See above.     This report was finalized on 6/23/2022 4:53 PM by Dr. Nolan Melgar M.D.     Patient: TABATHA CARTER  Time Out: 23:11  Exam(s): CT HEAD Without Contrast      EXAM:    CT Head Without Intravenous Contrast     CLINICAL HISTORY:     Reason for exam: ams, unable to ambulate.     TECHNIQUE:    Axial computed tomography images of the head brain without intravenous   contrast.  CTDI is 54.8 mGy and DLP is 1007.3 mGy-cm.  This CT exam was   performed according to the principle of ALARA (As Low As Reasonably   Achievable) by using one or more of the following dose reduction   techniques: automated exposure control, adjustment of the mA and or kV   according to patient size, and or use of iterative reconstruction   technique.     COMPARISON:    6 21 2022.     FINDINGS:    Brain:  Chronic lacunar infarcts basal ganglia bilaterally.  No   abnormal extra-axial collection is noted.  No hemorrhage.    Midline shift:  Midline anatomy is unremarkable.    Ventricles:  There is prominence of the ventricular system, cortical   sulci, basilar cisterns, compatible with age related atrophy.    Bones joints:  Calvarium is within normal limits.  No acute fracture.    Soft tissues:  Unremarkable.    Sinuses:  Visualized sinuses are unremarkable.     Mastoid air cells:  Mastoid air cells are well pneumatized.     IMPRESSION:       1.  Age-related atrophy and small vessel disease of aging.  2.  Chronic lacunar infarcts.  3.  No acute intracranial pathology.  4.  If there is concern for etiology such as early acute lacunar infarcts,   MRI imaging of the brain with diffusion-weighted sequences should be   performed.     IMPRESSION:        Electronically signed by Vadim Galaviz MD on 06-22-22 at 2311  CT HEAD WO CONTRAST-     CLINICAL HISTORY: Patient fell. Posterior head injury. Possible loss of  consciousness.     TECHNIQUE: Transverse 3 mm thick images were acquired from the base of  the skull to the vertex without IV contrast.     Radiation dose reduction techniques were utilized, including automated  exposure control and exposure modulation based on body size.     COMPARISON: CT scan of the head dated 12/01/2021.     FINDINGS: The brain and ventricular system appear structurally within  normal limits for patient of this age. There is ill-defined abnormal  diminished attenuation throughout the white matter of both cerebral  hemispheres that is consistent with sequela of moderately extensive  small vessel chronic ischemic change. Several small chronic white matter  infarcts are also present bilaterally that are unchanged there is no  evidence of acute infarct or hemorrhage. There is no mass effect. Bone  window images demonstrate no evidence of skull fracture. The visualized  paranasal sinuses and mastoid air cells and middle ear cavities are well  aerated.     IMPRESSION:  Evidence of moderately extensive small vessel chronic  ischemic change as described. Otherwise unremarkable CT scan of the  head. No acute intracranial abnormality is identified.     This report was finalized on 6/21/2022 10:10 PM by Dr. Franco Bishop M.D.     LEFT KNEE X-RAYS     CLINICAL HISTORY: Patient fell. Knee pain.     AP and lateral views demonstrate extensive  tricompartmental  osteoarthritis. There is marked narrowing of all 3 compartments of the  knee joint. There is mild diffuse osteopenia. There is no evidence of  recent or old fracture or subluxation. Extensive vascular calcification  is noted.     This report was finalized on 6/21/2022 8:56 PM by Dr. Franco Bishop M.D.       Impression: This is an 82-year-old female with history of hypertension, diabetes mellitus, diabetic peripheral neuropathy, lumbar spinal stenosis and hypothyroidism who presented to [facility] 6/22 in setting of low blood sugar resulting in fall and striking her head.  Patient originally presented with confusion and bilateral lower extremity weakness for which our service was consulted.  Confusion resolved fairly quickly and bilateral lower extremity weakness persisted.  At baseline patient uses walker to ambulate and has for some time-greater than 2 years.  Patient had CT of the head done 6/21-day prior to arrival which shows evidence of moderate extensive small vessel chronic ischemic changes with no other acute intracranial abnormalities.  CT the head here 6/22 showed age-related atrophy and small vessel disease of aging, evidence of chronic lacunar infarcts, no acute intracranial pathology otherwise.  The cervical spine was ordered by our service which showed moderate to severe compression fractures-unchanged since 2017.  No evidence of new/acute compression fractures/compression deformity or disc herniation.  Evidence of fusion of L2/L3.  Evidence of multilevel degenerative disease involving lumbar spine.  No obvious findings to support lower extremity weakness.  Will recommend aggressive outpatient physical therapy and follow-up with neurology office if weakness fails to improve and/or worsens.No further neurology workup indicated. We will sign off and see again upon request.    Diagnosis:  Paraparesis bilateral lower extremities above baseline  Lumbar spinal  stenosis  Hypoglycemia  Diabetic peripheral neuropathy  History of chronic lacunar infarct  Uses wheelchair to assist with mobility    Plan:  Recommend outpatient physical therapy; if weakness does not improve/deconditioning progresses would recommend consideration of MRI of the brain with and without contrast  Follow-up with neurology as needed      Case reviewed with attending neurologist Dr. Napoleon Emery and he agrees with treatment plan above.      Discussed signoff/discharge plan with hospitalist .     Nicole Haskins APRN

## 2022-06-28 ENCOUNTER — TELEPHONE (OUTPATIENT)
Dept: FAMILY MEDICINE CLINIC | Facility: CLINIC | Age: 82
End: 2022-06-28

## 2022-07-08 ENCOUNTER — TELEPHONE (OUTPATIENT)
Dept: FAMILY MEDICINE CLINIC | Facility: CLINIC | Age: 82
End: 2022-07-08

## 2022-07-12 DIAGNOSIS — F41.9 ANXIETY: ICD-10-CM

## 2022-07-12 DIAGNOSIS — M79.2 NEUROPATHIC PAIN: ICD-10-CM

## 2022-07-12 RX ORDER — DULOXETIN HYDROCHLORIDE 30 MG/1
30 CAPSULE, DELAYED RELEASE ORAL DAILY
Qty: 90 CAPSULE | Refills: 1 | OUTPATIENT
Start: 2022-07-12

## 2022-07-12 NOTE — TELEPHONE ENCOUNTER
Caller: Halie Guidry    Relationship: Self    Best call back number: [phone]    Requested Prescriptions:   Requested Prescriptions     Pending Prescriptions Disp Refills   • DULoxetine (CYMBALTA) 30 MG capsule 90 capsule 1     Sig: Take 1 capsule by mouth Daily.        Pharm[facility] where [facility] be sent[id number] [address]  AT [facility] [phone] [facility] [phone] FX     Additional details provided by patient: 3 LEFT    Does the patient have less than a 3 day supply:  [x] Yes  [] No    Jaylen Carreno Rep   07/12/22 10:12 EDT

## 2022-07-13 ENCOUNTER — TELEPHONE (OUTPATIENT)
Dept: FAMILY MEDICINE CLINIC | Facility: CLINIC | Age: 82
End: 2022-07-13

## 2022-07-13 NOTE — TELEPHONE ENCOUNTER
Caller: Halie Guidry    Relationship: Self    Best call back number: [phone] (H)    What is the best time to reach you: ANY    What was the call regarding: PATIENT STATES SHE LEFT A MESSAGE FOR DR SILVA ABOUT A PRESCRIPTION FOR DULoxetine (CYMBALTA) 30 MG capsule  SHE SAID IT WAS SENT TO [facility]  ON ACCIDENT AND SHE JUST WANTED TO LET DR SILVA KNOW SHE HAS PICKED IT UP.    Do you require a callback: NO

## 2022-07-14 ENCOUNTER — PATIENT OUTREACH (OUTPATIENT)
Dept: CASE MANAGEMENT | Facility: OTHER | Age: 82
End: 2022-07-14

## 2022-07-14 NOTE — OUTREACH NOTE
AMBULATORY CASE MANAGEMENT NOTE    Name and Relationship of Patient/Support Person: Derrick Guidry - Emergency Contact    Outgoing call to the son, Derrick Guidry. Introduces self and explained role of RN-MACIE. He states that his mother is home from [facility] and has HH services with [facility].  He states they do not have any needs at this time.  He declines HRCM and CCM services    GENEVIEVE ALMENDAREZ  Ambulatory Case Management    7/14/2022, 11:38 EDT

## 2022-08-09 ENCOUNTER — TELEPHONE (OUTPATIENT)
Dept: FAMILY MEDICINE CLINIC | Facility: CLINIC | Age: 82
End: 2022-08-09

## 2022-08-09 NOTE — TELEPHONE ENCOUNTER
Caller: [facility]    Relationship: Self    Best call back number: [phone]    What is the best time to reach you: ANY    Who are you requesting to speak with (clinical staff, provider,  specific staff member): CLINICAL    Do you know the name of the person who called: NONE    What was the call regarding: EXTEND PHYSICAL THERAPY ORDERS   TWICE A WEEK FOR 2 WEEKS  ONCE A WEEK FOR 2 WEEKS    Do you require a callback: YES

## 2022-08-21 DIAGNOSIS — Z79.4 TYPE 2 DIABETES MELLITUS WITH OTHER CIRCULATORY COMPLICATION, WITH LONG-TERM CURRENT USE OF INSULIN: ICD-10-CM

## 2022-08-21 DIAGNOSIS — E11.59 TYPE 2 DIABETES MELLITUS WITH OTHER CIRCULATORY COMPLICATION, WITH LONG-TERM CURRENT USE OF INSULIN: ICD-10-CM

## 2022-08-22 RX ORDER — PEN NEEDLE, DIABETIC 32GX 5/32"
NEEDLE, DISPOSABLE MISCELLANEOUS
Qty: 200 EACH | Refills: 0 | Status: SHIPPED | OUTPATIENT
Start: 2022-08-22

## 2022-08-24 DIAGNOSIS — E03.9 PRIMARY HYPOTHYROIDISM: ICD-10-CM

## 2022-08-24 RX ORDER — LEVOTHYROXINE SODIUM 112 UG/1
TABLET ORAL
Qty: 90 TABLET | Refills: 3 | Status: SHIPPED | OUTPATIENT
Start: 2022-08-24

## 2022-08-31 ENCOUNTER — TELEPHONE (OUTPATIENT)
Dept: FAMILY MEDICINE CLINIC | Facility: CLINIC | Age: 82
End: 2022-08-31

## 2022-08-31 NOTE — TELEPHONE ENCOUNTER
Elizabeth LOYA called pt's BP is 194/74 and . I told her the she needs to have an appointment to call the office tomorrow to schedule.

## 2022-09-01 ENCOUNTER — TELEPHONE (OUTPATIENT)
Dept: FAMILY MEDICINE CLINIC | Facility: CLINIC | Age: 82
End: 2022-09-01

## 2022-09-01 ENCOUNTER — APPOINTMENT (OUTPATIENT)
Dept: GENERAL RADIOLOGY | Facility: HOSPITAL | Age: 82
End: 2022-09-01

## 2022-09-01 ENCOUNTER — HOSPITAL ENCOUNTER (OUTPATIENT)
Facility: HOSPITAL | Age: 82
Setting detail: OBSERVATION
Discharge: HOME HEALTH | End: 2022-09-06
Attending: EMERGENCY MEDICINE | Admitting: INTERNAL MEDICINE

## 2022-09-01 DIAGNOSIS — H93.13 TINNITUS OF BOTH EARS: ICD-10-CM

## 2022-09-01 DIAGNOSIS — I10 ACCELERATED HYPERTENSION: Primary | ICD-10-CM

## 2022-09-01 DIAGNOSIS — R73.9 HYPERGLYCEMIA: ICD-10-CM

## 2022-09-01 DIAGNOSIS — E86.0 DEHYDRATION: ICD-10-CM

## 2022-09-01 LAB
ALBUMIN SERPL-MCNC: 3.8 G/DL (ref 3.5–5.2)
ALBUMIN/GLOB SERPL: 1.1 G/DL
ALP SERPL-CCNC: 108 U/L (ref 39–117)
ALT SERPL W P-5'-P-CCNC: 9 U/L (ref 1–33)
ANION GAP SERPL CALCULATED.3IONS-SCNC: 13.7 MMOL/L (ref 5–15)
AST SERPL-CCNC: 11 U/L (ref 1–32)
BASOPHILS # BLD AUTO: 0.12 10*3/MM3 (ref 0–0.2)
BASOPHILS NFR BLD AUTO: 1.3 % (ref 0–1.5)
BILIRUB SERPL-MCNC: 0.4 MG/DL (ref 0–1.2)
BUN SERPL-MCNC: 37 MG/DL (ref 8–23)
BUN/CREAT SERPL: 30.8 (ref 7–25)
CALCIUM SPEC-SCNC: 8.9 MG/DL (ref 8.6–10.5)
CHLORIDE SERPL-SCNC: 102 MMOL/L (ref 98–107)
CO2 SERPL-SCNC: 21.3 MMOL/L (ref 22–29)
CREAT SERPL-MCNC: 1.2 MG/DL (ref 0.57–1)
DEPRECATED RDW RBC AUTO: 42.5 FL (ref 37–54)
EGFRCR SERPLBLD CKD-EPI 2021: 45.3 ML/MIN/1.73
EOSINOPHIL # BLD AUTO: 0.49 10*3/MM3 (ref 0–0.4)
EOSINOPHIL NFR BLD AUTO: 5.2 % (ref 0.3–6.2)
ERYTHROCYTE [DISTWIDTH] IN BLOOD BY AUTOMATED COUNT: 13.7 % (ref 12.3–15.4)
GLOBULIN UR ELPH-MCNC: 3.4 GM/DL
GLUCOSE SERPL-MCNC: 236 MG/DL (ref 65–99)
HCT VFR BLD AUTO: 40.7 % (ref 34–46.6)
HGB BLD-MCNC: 13.2 G/DL (ref 12–15.9)
IMM GRANULOCYTES # BLD AUTO: 0.03 10*3/MM3 (ref 0–0.05)
IMM GRANULOCYTES NFR BLD AUTO: 0.3 % (ref 0–0.5)
LYMPHOCYTES # BLD AUTO: 1.98 10*3/MM3 (ref 0.7–3.1)
LYMPHOCYTES NFR BLD AUTO: 20.9 % (ref 19.6–45.3)
MCH RBC QN AUTO: 27.8 PG (ref 26.6–33)
MCHC RBC AUTO-ENTMCNC: 32.4 G/DL (ref 31.5–35.7)
MCV RBC AUTO: 85.7 FL (ref 79–97)
MONOCYTES # BLD AUTO: 0.7 10*3/MM3 (ref 0.1–0.9)
MONOCYTES NFR BLD AUTO: 7.4 % (ref 5–12)
NEUTROPHILS NFR BLD AUTO: 6.14 10*3/MM3 (ref 1.7–7)
NEUTROPHILS NFR BLD AUTO: 64.9 % (ref 42.7–76)
NRBC BLD AUTO-RTO: 0 /100 WBC (ref 0–0.2)
PLATELET # BLD AUTO: 301 10*3/MM3 (ref 140–450)
PMV BLD AUTO: 9.9 FL (ref 6–12)
POTASSIUM SERPL-SCNC: 4.5 MMOL/L (ref 3.5–5.2)
PROT SERPL-MCNC: 7.2 G/DL (ref 6–8.5)
QT INTERVAL: 495 MS
RBC # BLD AUTO: 4.75 10*6/MM3 (ref 3.77–5.28)
SODIUM SERPL-SCNC: 137 MMOL/L (ref 136–145)
TROPONIN T SERPL-MCNC: <0.01 NG/ML (ref 0–0.03)
WBC NRBC COR # BLD: 9.46 10*3/MM3 (ref 3.4–10.8)

## 2022-09-01 PROCEDURE — 71045 X-RAY EXAM CHEST 1 VIEW: CPT

## 2022-09-01 PROCEDURE — 83036 HEMOGLOBIN GLYCOSYLATED A1C: CPT | Performed by: NURSE PRACTITIONER

## 2022-09-01 PROCEDURE — 85025 COMPLETE CBC W/AUTO DIFF WBC: CPT | Performed by: EMERGENCY MEDICINE

## 2022-09-01 PROCEDURE — 99284 EMERGENCY DEPT VISIT MOD MDM: CPT

## 2022-09-01 PROCEDURE — 80053 COMPREHEN METABOLIC PANEL: CPT | Performed by: EMERGENCY MEDICINE

## 2022-09-01 PROCEDURE — G0378 HOSPITAL OBSERVATION PER HR: HCPCS

## 2022-09-01 PROCEDURE — 93010 ELECTROCARDIOGRAM REPORT: CPT | Performed by: INTERNAL MEDICINE

## 2022-09-01 PROCEDURE — 96365 THER/PROPH/DIAG IV INF INIT: CPT

## 2022-09-01 PROCEDURE — 25010000002 HYDRALAZINE PER 20 MG: Performed by: EMERGENCY MEDICINE

## 2022-09-01 PROCEDURE — 84484 ASSAY OF TROPONIN QUANT: CPT | Performed by: EMERGENCY MEDICINE

## 2022-09-01 PROCEDURE — 96375 TX/PRO/DX INJ NEW DRUG ADDON: CPT

## 2022-09-01 PROCEDURE — 93005 ELECTROCARDIOGRAM TRACING: CPT | Performed by: EMERGENCY MEDICINE

## 2022-09-01 PROCEDURE — 96376 TX/PRO/DX INJ SAME DRUG ADON: CPT

## 2022-09-01 RX ORDER — HYDRALAZINE HYDROCHLORIDE 20 MG/ML
10 INJECTION INTRAMUSCULAR; INTRAVENOUS ONCE
Status: COMPLETED | OUTPATIENT
Start: 2022-09-01 | End: 2022-09-01

## 2022-09-01 RX ADMIN — SODIUM CHLORIDE 500 ML: 9 INJECTION, SOLUTION INTRAVENOUS at 20:56

## 2022-09-01 RX ADMIN — SODIUM CHLORIDE 5 MG/HR: 9 INJECTION, SOLUTION INTRAVENOUS at 23:04

## 2022-09-01 RX ADMIN — HYDRALAZINE HYDROCHLORIDE 10 MG: 20 INJECTION INTRAMUSCULAR; INTRAVENOUS at 22:04

## 2022-09-01 RX ADMIN — HYDRALAZINE HYDROCHLORIDE 10 MG: 20 INJECTION INTRAMUSCULAR; INTRAVENOUS at 20:23

## 2022-09-01 NOTE — TELEPHONE ENCOUNTER
Patient was scheduled for appointment today with ally fuller at 200 pm. Pt Blood pressure  elevated and having ringing in her ears. Pt was advised to go to the ER for further evaluation.  Per ally fuller. Dr flanagan advised .

## 2022-09-01 NOTE — TELEPHONE ENCOUNTER
Nurse from [facility] called back stating pt's BP in R arm 222/70, and 230/74 in L arm.  Nurse inf that pt was advised to go to ER for eval.  Nurse states she understands that but they keep her on Lisiniopril, and no one changes the med.  Adv her to have pt follow up with Dr. Mead after ER visit, but let her know that was his recommendation for her to go to ER for eval, and treatment.

## 2022-09-01 NOTE — TELEPHONE ENCOUNTER
I talked with the HH. Patient does not have a RX for amlodipine 5 mg. In her bottle of lisinopril she has 5 mg and 20 mg so she does not know which one she is taking.     I told her with the her BP being that high and ringing in the ears she really needs to go to the ER and make a follow up appt with Dr Mead when she gets out.

## 2022-09-01 NOTE — ED TRIAGE NOTES
Pt arrives to the ER via pv with complaints of HTN and ringing in the ears. Pt's home health nurse from [facility] recommended she be seen here in the ER due to her B/P being in the 220's systolic. Pt's home health nurse believes her prescriptions were called in incorrectly b/c pt normally takes 20 mg and only had 5 mg pills in her bottle. Pt was also supposed to be taking amlodipine but couldn't get the script filled due to my chart saying it couldn't be refilled.  Pt normally see's . Pt denies any other s/s at this time. Pt's pressure here in the ER is 164/74. Pt's son is accompanying pt. Pt masked on arrival, this RN in ppe.

## 2022-09-01 NOTE — ED PROVIDER NOTES
EMERGENCY DEPARTMENT ENCOUNTER  I wore full protective equipment throughout this patient encounter including a N95 mask, eye shield, gown and gloves. Hand hygiene was performed before donning protective equipment and after removal when leaving the room.    Room Number:  15/15  Date of encounter:  9/1/2022  PCP: Napoleon Mead MD    HPI:  Context: Halie Guidry is a 82 y.o. female who presents to the ED c/o chief complaint of hypertension.  Patient reports that her blood pressure has been running elevated, has been that way for extended period time, unable to say how long.  Patient reports that her blood pressure is normally 150 systolic, has been running over 200 systolic for months.  Patient denies any chest pain or shortness of breath, no headache, no visual disturbances.  Patient's only complaint is tinnitus which has been going on since early this summer, patient reports that she saw her primary care provider in June regarding it, has not been referred to ENT.  Patient denies any hearing loss, no ear pain, no facial droop, no double vision, no difficulty swallowing, no difficulty with speech, no difficulty with balance or coordination.  Patient reports that she believes that one of her blood pressure medications is incorrect although she is unsure which 1.    MEDICAL HISTORY REVIEW  Reviewed in EPIC    PAST MEDICAL HISTORY  Active Ambulatory Problems     Diagnosis Date Noted   • Compression fracture 04/22/2009   • Lumbar degenerative disc disease 07/05/2012   • Type 2 diabetes mellitus, with long-term current use of insulin (HCC)    • Hyperlipidemia    • Benign essential hypertension 08/20/2014   • Primary hypothyroidism    • Leukocytosis    • Neuropathy    • Osteoporosis 09/09/2015   • Proteinuria 02/28/2012   • Tobacco abuse 08/22/2013   • Diabetic eye exam (HCC) 02/12/2014   • Generalized weakness 05/27/2017   • Spinal stenosis 05/27/2017   • Scoliosis 05/27/2017   • Arthritis 05/27/2017   • VBI  (vertebrobasilar insufficiency) 05/28/2017   • Orthostatic hypotension 05/28/2017   • 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 (HCC) 05/28/2017   • Severe hypothyroidism 05/30/2017   • Noncompliance with medication regimen 05/30/2017   • Vitamin D deficiency disease 06/01/2017   • Altered mental status 12/15/2017   • Tremor 01/09/2018   • Seizure (Formerly [facility]) 05/21/2019   • Stage 3b chronic kidney disease (Formerly [facility]) 03/09/2012   • Thoracic degenerative disc disease 01/27/2020   • Metabolic encephalopathy 12/02/2021   • VIPUL (acute kidney injury) (Formerly [facility]) 12/02/2021   • Hyperglycemia 12/02/2021   • Type II diabetes mellitus, uncontrolled 12/02/2021   • Lower abdominal pain 02/23/2022   • Transaminitis 02/23/2022   • COVID-19 virus detected 02/23/2022   • UTI (urinary tract infection) 02/23/2022   • 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   • Choledocholithiasis 02/23/2022   • Hypokalemia 02/24/2022   • Hypoxia 02/24/2022   • Generalized abdominal pain 03/26/2022   • History of Clostridium difficile infection 03/26/2022   • History of ERCP 03/26/2022   • Acute UTI (urinary tract infection) 03/27/2022   • Hypomagnesemia 03/28/2022   • Burning pain 05/27/2022   • Anxiety disorder 05/27/2022   • Neuropathic pain 05/27/2022   • Intertrigo 05/27/2022   • Weakness of both lower extremities 06/24/2022   • Acute metabolic encephalopathy 06/24/2022     Resolved Ambulatory Problems     Diagnosis Date Noted   • Acute metabolic encephalopathy 06/22/2022   • Hypoglycemia 06/23/2022     Past Medical History:   Diagnosis Date   • Anxiety    • 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Formerly [facility])    • Depression    • Diabetes (Formerly [facility])    • Diabetes mellitus, type 2 (Formerly [facility])    • Disc degeneration, lumbar    • Headache, tension-type    • Hypothyroidism    • Peripheral neuropathy    • Rotator cuff tear, left    • Shoulder pain        PAST SURGICAL HISTORY  Past Surgical History:   Procedure Laterality Date   • APPENDECTOMY     • BILATERAL BREAST REDUCTION Bilateral 08/2015   •  CATARACT EXTRACTION  2015   • CHOLECYSTECTOMY WITH INTRAOPERATIVE CHOLANGIOGRAM N/A 3/27/2022    Procedure: CHOLECYSTECTOMY LAPAROSCOPIC INTRAOPERATIVE CHOLANGIOGRAM;  Surgeon: Aiyana Hill MD;  Location: [facility] OR;  Service: General;  Laterality: N/A;   • COLONOSCOPY  2015    WNL   • ERCP N/A 2022    Procedure: ENDOSCOPIC RETROGRADE CHOLANGIOPANCREATOGRAPHY with sphincterotomy and balloon sweep;  Surgeon: Chilo Wilhelm MD;  Location: [facility] ENDOSCOPY;  Service: Gastroenterology;  Laterality: N/A;  PRE/POST - CBD stones   • ERCP N/A 3/28/2022    Procedure: ENDOSCOPIC RETROGRADE CHOLANGIOPANCREATOGRAPHY WITH SPHINCTEROTOMY AND BALLOON SWEEP;  Surgeon: Chilo Wilhelm MD;  Location: [facility] ENDOSCOPY;  Service: Gastroenterology;  Laterality: N/A;  PRE: COMMON DUCT STONE  POST: COMMON DUCT STONE   • KYPHOPLASTY     • REDUCTION MAMMAPLASTY     • TONSILLECTOMY         FAMILY HISTORY  Family History   Problem Relation Age of Onset   • Bone cancer Mother    • No Known Problems Father        SOCIAL HISTORY  Social History     Socioeconomic History   • Marital status:    Tobacco Use   • Smoking status: Former Smoker     Packs/day: 1.00     Quit date:      Years since quittin.6   • Smokeless tobacco: Never Used   Vaping Use   • Vaping Use: Never used   Substance and Sexual Activity   • Alcohol use: No   • Drug use: No   • Sexual activity: Defer       ALLERGIES  Sulfa antibiotics    The patient's allergies have been reviewed    REVIEW OF SYSTEMS  All systems reviewed and negative except for those discussed in HPI.     PHYSICAL EXAM  I have reviewed the triage vital signs and nursing notes.  ED Triage Vitals   Temp Heart Rate Resp BP SpO2   22 17522 17522   97.5 °F (36.4 °C) 62 18 164/74 93 %      Temp src Heart Rate Source Patient Position BP Location FiO2 (%)   22 1756 22 1756 22 18022 --    Tympanic Monitor Sitting Right arm        General: No acute distress.  HENT: NCAT, PERRL, Nares patent.  Eyes: no scleral icterus.  Neck: trachea midline, no ROM limitations.  CV: regular rhythm, regular rate.  Respiratory: normal effort, CTAB.  Abdomen: soft, nondistended, NTTP, no rebound tenderness, no guarding or rigidity.  Musculoskeletal: no deformity.  Neuro: Alert and oriented, no facial droop, speech clear, no dysarthria or aphasia, moves all extremities well, sensation intact light touch all extremities, no focal deficits  Skin: warm, dry.    LAB RESULTS  Recent Results (from the past 24 hour(s))   ECG 12 Lead    Collection Time: 09/01/22  7:37 PM   Result Value Ref Range    QT Interval 495 ms   Comprehensive Metabolic Panel    Collection Time: 09/01/22  7:52 PM    Specimen: Arm, Right; Blood   Result Value Ref Range    Glucose 236 (H) 65 - 99 mg/dL    BUN 37 (H) 8 - 23 mg/dL    Creatinine 1.20 (H) 0.57 - 1.00 mg/dL    Sodium 137 136 - 145 mmol/L    Potassium 4.5 3.5 - 5.2 mmol/L    Chloride 102 98 - 107 mmol/L    CO2 21.3 (L) 22.0 - 29.0 mmol/L    Calcium 8.9 8.6 - 10.5 mg/dL    Total Protein 7.2 6.0 - 8.5 g/dL    Albumin 3.80 3.50 - 5.20 g/dL    ALT (SGPT) 9 1 - 33 U/L    AST (SGOT) 11 1 - 32 U/L    Alkaline Phosphatase 108 39 - 117 U/L    Total Bilirubin 0.4 0.0 - 1.2 mg/dL    Globulin 3.4 gm/dL    A/G Ratio 1.1 g/dL    BUN/Creatinine Ratio 30.8 (H) 7.0 - 25.0    Anion Gap 13.7 5.0 - 15.0 mmol/L    eGFR 45.3 (L) >60.0 mL/min/1.73   Troponin    Collection Time: 09/01/22  7:52 PM    Specimen: Arm, Right; Blood   Result Value Ref Range    Troponin T <0.010 0.000 - 0.030 ng/mL   CBC Auto Differential    Collection Time: 09/01/22  8:27 PM    Specimen: Arm, Right; Blood   Result Value Ref Range    WBC 9.46 3.40 - 10.80 10*3/mm3    RBC 4.75 3.77 - 5.28 10*6/mm3    Hemoglobin 13.2 12.0 - 15.9 g/dL    Hematocrit 40.7 34.0 - 46.6 %    MCV 85.7 79.0 - 97.0 fL    MCH 27.8 26.6 - 33.0 pg    MCHC 32.4 31.5 - 35.7  g/dL    RDW 13.7 12.3 - 15.4 %    RDW-SD 42.5 37.0 - 54.0 fl    MPV 9.9 6.0 - 12.0 fL    Platelets 301 140 - 450 10*3/mm3    Neutrophil % 64.9 42.7 - 76.0 %    Lymphocyte % 20.9 19.6 - 45.3 %    Monocyte % 7.4 5.0 - 12.0 %    Eosinophil % 5.2 0.3 - 6.2 %    Basophil % 1.3 0.0 - 1.5 %    Immature Grans % 0.3 0.0 - 0.5 %    Neutrophils, Absolute 6.14 1.70 - 7.00 10*3/mm3    Lymphocytes, Absolute 1.98 0.70 - 3.10 10*3/mm3    Monocytes, Absolute 0.70 0.10 - 0.90 10*3/mm3    Eosinophils, Absolute 0.49 (H) 0.00 - 0.40 10*3/mm3    Basophils, Absolute 0.12 0.00 - 0.20 10*3/mm3    Immature Grans, Absolute 0.03 0.00 - 0.05 10*3/mm3    nRBC 0.0 0.0 - 0.2 /100 WBC       I ordered the above labs and reviewed the results.    RADIOLOGY  XR Chest 1 View    Result Date: 9/1/2022  XR CHEST 1 VW-  09/01/2022  HISTORY: Hypertension.  Heart size is within normal limits. Lungs appear free of acute infiltrates. Tiny calcified granulomas in the right midlung.  Bones and soft tissues are otherwise unremarkable except for some aortic calcification.      1. No acute process.  This report was finalized on 9/1/2022 7:42 PM by Dr. Mario Alberto oMura M.D.        I ordered the above noted radiological studies. I reviewed the images and results. I agree with the radiologist interpretation.    PROCEDURES  Procedures    MEDICATIONS GIVEN IN ER  Medications   niCARdipine (CARDENE) 25 mg in 250 mL NS infusion kit (has no administration in time range)   hydrALAZINE (APRESOLINE) injection 10 mg (10 mg Intravenous Given 9/1/22 2023)   sodium chloride 0.9 % bolus 500 mL (0 mL Intravenous Stopped 9/1/22 2150)   hydrALAZINE (APRESOLINE) injection 10 mg (10 mg Intravenous Given 9/1/22 2204)       PROGRESS, DATA ANALYSIS, CONSULTS, AND MEDICAL DECISION MAKING  A complete history and physical exam have been performed.  All available laboratory and imaging results have been reviewed by myself prior to disposition.    MDM  After the initial H&P, I discussed  pertinent information from history and physical exam with patient/family.  Discussed differential diagnosis.  Discussed plan for ED evaluation/workup/treatment.  All questions answered.  Patient/family is agreeable with plan.  ED Course as of 09/01/22 2258   u Sep 01, 2022   1943 EKG independently viewed and contemporaneously interpreted by ED physician. Time: 1937.  Rate 56.  Interpretation: Normal sinus rhythm, normal axis, left atrial enlargement, right bundle branch block, no acute ST changes. [JG]   1944 When compared with prior EKG on 5/26/2022, no acute changes are present. [JG]   2030 Blood pressure improved after hydralazine. [JG]   2039 Patient has mild dehydration with creatinine just above the upper limit of normal, BUN 37.  Giving IV fluids.  Glucose mildly elevated, no signs of DKA. [JG]   2145 Patient rechecked, blood pressure is again elevated, giving additional hydralazine. [JG]   2227 Patient continues to be elevated despite multiple doses of hydralazine, after second dose blood pressure is still 196 systolic.  Started on nicardipine drip, consulting hospitalist for admission. [JG]   2257 Phone call with [email or web address] [employer].  Discussed the patient, relevant history, exam, diagnostics, ED findings/progress, and concerns. They agree to admit the patient to telemetry observation under Dr. Sanchez. Care assumed by the admitting physician at this time.     [JG]      ED Course User Index  [JG] Gopi Fiore MD       AS OF 22:58 EDT VITALS:    BP - (!) 193/69  HR - 66  TEMP - 97.5 °F (36.4 °C) (Tympanic)  O2 SATS - 96%    DIAGNOSIS  Final diagnoses:   Dehydration   Hyperglycemia   Tinnitus of both ears   Accelerated hypertension     Critical care:  Total critical care time of 30 minutes is exclusive of any other billable procedures and includes time spent with direct patient care and observation, retrospective chart review, management of acute condition, and consultation with other  physicians.      DISPOSITION  ADMISSION    Discussed treatment plan and reason for admission with pt/family and admitting physician.  Pt/family voiced understanding of the plan for admission for further testing/treatment as needed.          Gopi Fiore MD  09/01/22 1901

## 2022-09-02 ENCOUNTER — APPOINTMENT (OUTPATIENT)
Dept: ULTRASOUND IMAGING | Facility: HOSPITAL | Age: 82
End: 2022-09-02

## 2022-09-02 LAB
ANION GAP SERPL CALCULATED.3IONS-SCNC: 12 MMOL/L (ref 5–15)
BACTERIA UR QL AUTO: ABNORMAL /HPF
BILIRUB UR QL STRIP: NEGATIVE
BUN SERPL-MCNC: 31 MG/DL (ref 8–23)
BUN/CREAT SERPL: 31 (ref 7–25)
CALCIUM SPEC-SCNC: 9.1 MG/DL (ref 8.6–10.5)
CHLORIDE SERPL-SCNC: 102 MMOL/L (ref 98–107)
CHLORIDE UR-SCNC: 83 MMOL/L
CLARITY UR: CLEAR
CO2 SERPL-SCNC: 22 MMOL/L (ref 22–29)
COLOR UR: YELLOW
CREAT SERPL-MCNC: 1 MG/DL (ref 0.57–1)
CREAT UR-MCNC: 51 MG/DL
DEPRECATED RDW RBC AUTO: 40.4 FL (ref 37–54)
EGFRCR SERPLBLD CKD-EPI 2021: 56.4 ML/MIN/1.73
ERYTHROCYTE [DISTWIDTH] IN BLOOD BY AUTOMATED COUNT: 13.6 % (ref 12.3–15.4)
GLUCOSE BLDC GLUCOMTR-MCNC: 230 MG/DL (ref 70–130)
GLUCOSE BLDC GLUCOMTR-MCNC: 269 MG/DL (ref 70–130)
GLUCOSE BLDC GLUCOMTR-MCNC: 321 MG/DL (ref 70–130)
GLUCOSE BLDC GLUCOMTR-MCNC: 342 MG/DL (ref 70–130)
GLUCOSE BLDC GLUCOMTR-MCNC: 345 MG/DL (ref 70–130)
GLUCOSE SERPL-MCNC: 300 MG/DL (ref 65–99)
GLUCOSE UR STRIP-MCNC: NEGATIVE MG/DL
HBA1C MFR BLD: 7.7 % (ref 4.8–5.6)
HCT VFR BLD AUTO: 37.2 % (ref 34–46.6)
HGB BLD-MCNC: 12.6 G/DL (ref 12–15.9)
HGB UR QL STRIP.AUTO: NEGATIVE
HYALINE CASTS UR QL AUTO: ABNORMAL /LPF
KETONES UR QL STRIP: NEGATIVE
LEUKOCYTE ESTERASE UR QL STRIP.AUTO: NEGATIVE
MCH RBC QN AUTO: 27.9 PG (ref 26.6–33)
MCHC RBC AUTO-ENTMCNC: 33.9 G/DL (ref 31.5–35.7)
MCV RBC AUTO: 82.5 FL (ref 79–97)
NITRITE UR QL STRIP: NEGATIVE
OSMOLALITY UR: 449 MOSM/KG
PH UR STRIP.AUTO: 6.5 [PH] (ref 5–8)
PLATELET # BLD AUTO: 273 10*3/MM3 (ref 140–450)
PMV BLD AUTO: 9.5 FL (ref 6–12)
POTASSIUM SERPL-SCNC: 4 MMOL/L (ref 3.5–5.2)
PROT ?TM UR-MCNC: 94.5 MG/DL
PROT UR QL STRIP: ABNORMAL
PROT/CREAT UR: 1852.9 MG/G CREA (ref 0–200)
RBC # BLD AUTO: 4.51 10*6/MM3 (ref 3.77–5.28)
RBC # UR STRIP: ABNORMAL /HPF
REF LAB TEST METHOD: ABNORMAL
SODIUM SERPL-SCNC: 136 MMOL/L (ref 136–145)
SODIUM UR-SCNC: 98 MMOL/L
SP GR UR STRIP: 1.01 (ref 1–1.03)
SQUAMOUS #/AREA URNS HPF: ABNORMAL /HPF
TSH SERPL DL<=0.05 MIU/L-ACNC: 4.55 UIU/ML (ref 0.27–4.2)
UROBILINOGEN UR QL STRIP: ABNORMAL
WBC # UR STRIP: ABNORMAL /HPF
WBC NRBC COR # BLD: 13.99 10*3/MM3 (ref 3.4–10.8)

## 2022-09-02 PROCEDURE — 84300 ASSAY OF URINE SODIUM: CPT | Performed by: NURSE PRACTITIONER

## 2022-09-02 PROCEDURE — 25010000002 ENOXAPARIN PER 10 MG: Performed by: NURSE PRACTITIONER

## 2022-09-02 PROCEDURE — G0378 HOSPITAL OBSERVATION PER HR: HCPCS

## 2022-09-02 PROCEDURE — 97530 THERAPEUTIC ACTIVITIES: CPT

## 2022-09-02 PROCEDURE — 83935 ASSAY OF URINE OSMOLALITY: CPT | Performed by: NURSE PRACTITIONER

## 2022-09-02 PROCEDURE — 81001 URINALYSIS AUTO W/SCOPE: CPT | Performed by: NURSE PRACTITIONER

## 2022-09-02 PROCEDURE — 97162 PT EVAL MOD COMPLEX 30 MIN: CPT

## 2022-09-02 PROCEDURE — 82962 GLUCOSE BLOOD TEST: CPT

## 2022-09-02 PROCEDURE — 80048 BASIC METABOLIC PNL TOTAL CA: CPT | Performed by: NURSE PRACTITIONER

## 2022-09-02 PROCEDURE — 63710000001 INSULIN LISPRO (HUMAN) PER 5 UNITS: Performed by: NURSE PRACTITIONER

## 2022-09-02 PROCEDURE — 25010000002 HYDRALAZINE PER 20 MG: Performed by: INTERNAL MEDICINE

## 2022-09-02 PROCEDURE — 84443 ASSAY THYROID STIM HORMONE: CPT | Performed by: INTERNAL MEDICINE

## 2022-09-02 PROCEDURE — 82570 ASSAY OF URINE CREATININE: CPT | Performed by: INTERNAL MEDICINE

## 2022-09-02 PROCEDURE — 84156 ASSAY OF PROTEIN URINE: CPT | Performed by: INTERNAL MEDICINE

## 2022-09-02 PROCEDURE — 96376 TX/PRO/DX INJ SAME DRUG ADON: CPT

## 2022-09-02 PROCEDURE — 85027 COMPLETE CBC AUTOMATED: CPT | Performed by: NURSE PRACTITIONER

## 2022-09-02 PROCEDURE — 96372 THER/PROPH/DIAG INJ SC/IM: CPT

## 2022-09-02 PROCEDURE — 97166 OT EVAL MOD COMPLEX 45 MIN: CPT

## 2022-09-02 PROCEDURE — 76775 US EXAM ABDO BACK WALL LIM: CPT

## 2022-09-02 PROCEDURE — 82436 ASSAY OF URINE CHLORIDE: CPT | Performed by: NURSE PRACTITIONER

## 2022-09-02 PROCEDURE — 96366 THER/PROPH/DIAG IV INF ADDON: CPT

## 2022-09-02 RX ORDER — DULOXETIN HYDROCHLORIDE 30 MG/1
30 CAPSULE, DELAYED RELEASE ORAL DAILY
Status: DISCONTINUED | OUTPATIENT
Start: 2022-09-02 | End: 2022-09-06 | Stop reason: HOSPADM

## 2022-09-02 RX ORDER — ALUMINA, MAGNESIA, AND SIMETHICONE 2400; 2400; 240 MG/30ML; MG/30ML; MG/30ML
15 SUSPENSION ORAL EVERY 6 HOURS PRN
Status: DISCONTINUED | OUTPATIENT
Start: 2022-09-02 | End: 2022-09-06 | Stop reason: HOSPADM

## 2022-09-02 RX ORDER — LANSOPRAZOLE 15 MG/1
15 CAPSULE, DELAYED RELEASE ORAL DAILY
COMMUNITY

## 2022-09-02 RX ORDER — ACETAMINOPHEN 325 MG/1
650 TABLET ORAL EVERY 4 HOURS PRN
Status: DISCONTINUED | OUTPATIENT
Start: 2022-09-02 | End: 2022-09-06 | Stop reason: HOSPADM

## 2022-09-02 RX ORDER — LEVOTHYROXINE SODIUM 112 UG/1
112 TABLET ORAL DAILY
Status: DISCONTINUED | OUTPATIENT
Start: 2022-09-02 | End: 2022-09-06 | Stop reason: HOSPADM

## 2022-09-02 RX ORDER — BISOPROLOL FUMARATE 5 MG/1
2.5 TABLET, FILM COATED ORAL DAILY
Status: DISCONTINUED | OUTPATIENT
Start: 2022-09-02 | End: 2022-09-02

## 2022-09-02 RX ORDER — INSULIN LISPRO 100 [IU]/ML
0-9 INJECTION, SOLUTION INTRAVENOUS; SUBCUTANEOUS
Status: DISCONTINUED | OUTPATIENT
Start: 2022-09-02 | End: 2022-09-06 | Stop reason: HOSPADM

## 2022-09-02 RX ORDER — ONDANSETRON 2 MG/ML
4 INJECTION INTRAMUSCULAR; INTRAVENOUS EVERY 6 HOURS PRN
Status: DISCONTINUED | OUTPATIENT
Start: 2022-09-02 | End: 2022-09-06 | Stop reason: HOSPADM

## 2022-09-02 RX ORDER — PANTOPRAZOLE SODIUM 40 MG/1
40 TABLET, DELAYED RELEASE ORAL EVERY MORNING
Status: DISCONTINUED | OUTPATIENT
Start: 2022-09-02 | End: 2022-09-06 | Stop reason: HOSPADM

## 2022-09-02 RX ORDER — NICOTINE POLACRILEX 4 MG
15 LOZENGE BUCCAL
Status: DISCONTINUED | OUTPATIENT
Start: 2022-09-02 | End: 2022-09-06 | Stop reason: HOSPADM

## 2022-09-02 RX ORDER — ONDANSETRON 4 MG/1
4 TABLET, FILM COATED ORAL EVERY 6 HOURS PRN
Status: DISCONTINUED | OUTPATIENT
Start: 2022-09-02 | End: 2022-09-06 | Stop reason: HOSPADM

## 2022-09-02 RX ORDER — HYDROCODONE BITARTRATE AND ACETAMINOPHEN 5; 325 MG/1; MG/1
1 TABLET ORAL EVERY 6 HOURS PRN
Status: DISCONTINUED | OUTPATIENT
Start: 2022-09-02 | End: 2022-09-06 | Stop reason: HOSPADM

## 2022-09-02 RX ORDER — LISINOPRIL 10 MG/1
10 TABLET ORAL
Status: DISCONTINUED | OUTPATIENT
Start: 2022-09-02 | End: 2022-09-03

## 2022-09-02 RX ORDER — DEXTROSE MONOHYDRATE 25 G/50ML
25 INJECTION, SOLUTION INTRAVENOUS
Status: DISCONTINUED | OUTPATIENT
Start: 2022-09-02 | End: 2022-09-06 | Stop reason: HOSPADM

## 2022-09-02 RX ORDER — BISOPROLOL FUMARATE 5 MG/1
5 TABLET, FILM COATED ORAL DAILY
Status: DISCONTINUED | OUTPATIENT
Start: 2022-09-03 | End: 2022-09-06 | Stop reason: HOSPADM

## 2022-09-02 RX ORDER — HYDRALAZINE HYDROCHLORIDE 20 MG/ML
10 INJECTION INTRAMUSCULAR; INTRAVENOUS ONCE
Status: COMPLETED | OUTPATIENT
Start: 2022-09-02 | End: 2022-09-02

## 2022-09-02 RX ORDER — ENOXAPARIN SODIUM 100 MG/ML
40 INJECTION SUBCUTANEOUS DAILY
Status: DISCONTINUED | OUTPATIENT
Start: 2022-09-02 | End: 2022-09-06 | Stop reason: HOSPADM

## 2022-09-02 RX ORDER — PREGABALIN 50 MG/1
50 CAPSULE ORAL 3 TIMES DAILY
Status: DISCONTINUED | OUTPATIENT
Start: 2022-09-02 | End: 2022-09-06 | Stop reason: HOSPADM

## 2022-09-02 RX ORDER — SODIUM CHLORIDE 0.9 % (FLUSH) 0.9 %
10 SYRINGE (ML) INJECTION AS NEEDED
Status: DISCONTINUED | OUTPATIENT
Start: 2022-09-02 | End: 2022-09-06 | Stop reason: HOSPADM

## 2022-09-02 RX ORDER — ACETAMINOPHEN 160 MG/5ML
650 SOLUTION ORAL EVERY 4 HOURS PRN
Status: DISCONTINUED | OUTPATIENT
Start: 2022-09-02 | End: 2022-09-06 | Stop reason: HOSPADM

## 2022-09-02 RX ORDER — NITROGLYCERIN 0.4 MG/1
0.4 TABLET SUBLINGUAL
Status: DISCONTINUED | OUTPATIENT
Start: 2022-09-02 | End: 2022-09-06 | Stop reason: HOSPADM

## 2022-09-02 RX ORDER — ATORVASTATIN CALCIUM 80 MG/1
80 TABLET, FILM COATED ORAL NIGHTLY
Status: DISCONTINUED | OUTPATIENT
Start: 2022-09-02 | End: 2022-09-06 | Stop reason: HOSPADM

## 2022-09-02 RX ORDER — ACETAMINOPHEN 650 MG/1
650 SUPPOSITORY RECTAL EVERY 4 HOURS PRN
Status: DISCONTINUED | OUTPATIENT
Start: 2022-09-02 | End: 2022-09-06 | Stop reason: HOSPADM

## 2022-09-02 RX ADMIN — SODIUM CHLORIDE 7.5 MG/HR: 9 INJECTION, SOLUTION INTRAVENOUS at 02:19

## 2022-09-02 RX ADMIN — INSULIN LISPRO 7 UNITS: 100 INJECTION, SOLUTION INTRAVENOUS; SUBCUTANEOUS at 13:18

## 2022-09-02 RX ADMIN — ATORVASTATIN CALCIUM 80 MG: 80 TABLET, FILM COATED ORAL at 20:46

## 2022-09-02 RX ADMIN — INSULIN LISPRO 6 UNITS: 100 INJECTION, SOLUTION INTRAVENOUS; SUBCUTANEOUS at 16:40

## 2022-09-02 RX ADMIN — LISINOPRIL 10 MG: 10 TABLET ORAL at 13:32

## 2022-09-02 RX ADMIN — PREGABALIN 50 MG: 50 CAPSULE ORAL at 16:40

## 2022-09-02 RX ADMIN — ACETAMINOPHEN 650 MG: 325 TABLET, FILM COATED ORAL at 16:40

## 2022-09-02 RX ADMIN — HYDROCODONE BITARTRATE AND ACETAMINOPHEN 1 TABLET: 5; 325 TABLET ORAL at 23:39

## 2022-09-02 RX ADMIN — INSULIN GLARGINE-YFGN 30 UNITS: 100 INJECTION, SOLUTION SUBCUTANEOUS at 02:32

## 2022-09-02 RX ADMIN — PREGABALIN 50 MG: 50 CAPSULE ORAL at 09:22

## 2022-09-02 RX ADMIN — INSULIN GLARGINE-YFGN 30 UNITS: 100 INJECTION, SOLUTION SUBCUTANEOUS at 20:45

## 2022-09-02 RX ADMIN — SODIUM CHLORIDE 5 MG/HR: 9 INJECTION, SOLUTION INTRAVENOUS at 18:11

## 2022-09-02 RX ADMIN — ENOXAPARIN SODIUM 40 MG: 100 INJECTION SUBCUTANEOUS at 13:17

## 2022-09-02 RX ADMIN — DULOXETINE HYDROCHLORIDE 30 MG: 30 CAPSULE, DELAYED RELEASE ORAL at 09:22

## 2022-09-02 RX ADMIN — PREGABALIN 50 MG: 50 CAPSULE ORAL at 23:39

## 2022-09-02 RX ADMIN — PANTOPRAZOLE SODIUM 40 MG: 40 TABLET, DELAYED RELEASE ORAL at 06:13

## 2022-09-02 RX ADMIN — HYDRALAZINE HYDROCHLORIDE 10 MG: 20 INJECTION, SOLUTION INTRAMUSCULAR; INTRAVENOUS at 13:17

## 2022-09-02 RX ADMIN — HYDROCODONE BITARTRATE AND ACETAMINOPHEN 1 TABLET: 5; 325 TABLET ORAL at 02:31

## 2022-09-02 RX ADMIN — HYDROCODONE BITARTRATE AND ACETAMINOPHEN 1 TABLET: 5; 325 TABLET ORAL at 16:42

## 2022-09-02 RX ADMIN — SODIUM CHLORIDE 5 MG/HR: 9 INJECTION, SOLUTION INTRAVENOUS at 23:40

## 2022-09-02 RX ADMIN — LEVOTHYROXINE SODIUM 112 MCG: 0.11 TABLET ORAL at 09:22

## 2022-09-02 RX ADMIN — BISOPROLOL FUMARATE 2.5 MG: 5 TABLET, FILM COATED ORAL at 09:22

## 2022-09-02 NOTE — PLAN OF CARE
Goal Outcome Evaluation:  Plan of Care Reviewed With: patient     Outcome Evaluation: Pt is an 81 yo female admitted with hypertension. PMH includes seizure, VBI, DM2, CKD, htn. Pt lives with her son, owns a WC and walker. She reports a fall in june and a recent SNF stay. She presents today AOX4, pleasant and agreeable. She demo's generalized weakness, act tolerance/endurance and dec balance with fxl mobility. EOB with SBA, UB dressing with set-up/SBA. STS and fxl ambulation using RW with Vaughn, forward flexed posture with difficulty correcting and somewhat unsteady. Pt TF to bedside chair. Will benefit from cont OT services in the acute care setting to address fxl deficits, recommend d/c SNF to promote return to PLOF.    Hand hygiene completed before and after session. Appropriate PPE worn t/o

## 2022-09-02 NOTE — THERAPY EVALUATION
Patient Name: Halie Guidry  : 1940    MRN: [id number]                              Today's Date: 2022       Admit Date: 2022    Visit Dx:     ICD-10-CM ICD-9-CM   1. Accelerated hypertension  I10 401.0   2. Dehydration  E86.0 276.51   3. Hyperglycemia  R73.9 790.29   4. Tinnitus of both ears  H93.13 388.30     Patient Active Problem List   Diagnosis   • Compression fracture   • Lumbar degenerative disc disease   • Type 2 diabetes mellitus, with long-term current use of insulin ([facility]ly [facility])   • Hyperlipidemia   • Benign essential hypertension   • Primary hypothyroidism   • Leukocytosis   • Neuropathy   • Osteoporosis   • Proteinuria   • Tobacco abuse   • Diabetic eye exam ([facility])   • Generalized weakness   • Spinal stenosis   • Scoliosis   • Arthritis   • VBI (vertebrobasilar insufficiency)   • Orthostatic hypotension   • 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 ([facility])   • Severe hypothyroidism   • Noncompliance with medication regimen   • Vitamin D deficiency disease   • Altered mental status   • Tremor   • Seizure ([facility])   • Stage 3a chronic kidney disease ([facility])   • Thoracic degenerative disc disease   • Metabolic encephalopathy   • VIPUL (acute kidney injury) ([facility])   • Hyperglycemia   • Type II diabetes mellitus, uncontrolled   • Lower abdominal pain   • Transaminitis   • COVID-19 virus detected   • UTI (urinary tract infection)   • Emphysematous cystitis   • Choledocholithiasis   • Hypokalemia   • Hypoxia   • Generalized abdominal pain   • History of Clostridium difficile infection   • History of ERCP   • Acute UTI (urinary tract infection)   • Hypomagnesemia   • Burning pain   • Anxiety disorder   • Neuropathic pain   • Intertrigo   • Weakness of both lower extremities   • Acute metabolic encephalopathy   • Accelerated hypertension     Past Medical History:   Diagnosis Date   • Anxiety    • Arthritis    • Benign essential hypertension 2014   • 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[facility])    •  Depression    • Diabetes (HCC)    • Diabetes mellitus, type 2 (HCC)    • Disc degeneration, lumbar    • Headache, tension-type    • Hyperlipidemia    • Hypothyroidism    • Neuropathy    • Osteoporosis 09/09/2015   • Peripheral neuropathy    • Rotator cuff tear, left    • Scoliosis    • Shoulder pain     LEFT, TORN ROTATOR CUFF S/P FALL   • Spinal stenosis      Past Surgical History:   Procedure Laterality Date   • APPENDECTOMY     • BILATERAL BREAST REDUCTION Bilateral 08/2015   • CATARACT EXTRACTION  03/2015   • CHOLECYSTECTOMY WITH INTRAOPERATIVE CHOLANGIOGRAM N/A 3/27/2022    Procedure: CHOLECYSTECTOMY LAPAROSCOPIC INTRAOPERATIVE CHOLANGIOGRAM;  Surgeon: Aiyana Hill MD;  Location: [facility] MAIN OR;  Service: General;  Laterality: N/A;   • COLONOSCOPY  06/05/2015    WNL   • ERCP N/A 2/25/2022    Procedure: ENDOSCOPIC RETROGRADE CHOLANGIOPANCREATOGRAPHY with sphincterotomy and balloon sweep;  Surgeon: Chilo Wilhelm MD;  Location: [facility] ENDOSCOPY;  Service: Gastroenterology;  Laterality: N/A;  PRE/POST - CBD stones   • ERCP N/A 3/28/2022    Procedure: ENDOSCOPIC RETROGRADE CHOLANGIOPANCREATOGRAPHY WITH SPHINCTEROTOMY AND BALLOON SWEEP;  Surgeon: Chilo Wilhelm MD;  Location: [facility] ENDOSCOPY;  Service: Gastroenterology;  Laterality: N/A;  PRE: COMMON DUCT STONE  POST: COMMON DUCT STONE   • KYPHOPLASTY     • REDUCTION MAMMAPLASTY     • TONSILLECTOMY        General Information     Row Name 09/02/22 1414          Physical Therapy Time and Intention    Document Type evaluation  -CF     Mode of Treatment individual therapy;physical therapy  -CF     Row Name 09/02/22 1414          General Information    Patient Profile Reviewed yes  -CF     Prior Level of Function independent:;transfer;all household mobility;bed mobility  recent SNF stay, uses walker  -CF     Existing Precautions/Restrictions fall  -CF     Barriers to Rehab none identified  -CF     Row Name 09/02/22 1414          Living Environment     People in Home child(beatriz), adult  son  -CF     Row Name 09/02/22 1414          Home Main Entrance    Number of Stairs, Main Entrance none  -     Row Name 09/02/22 1414          Stairs Within Home, Primary    Number of Stairs, Within Home, Primary none  -     Row Name 09/02/22 1414          Cognition    Orientation Status (Cognition) oriented x 3  -     Row Name 09/02/22 1414          Safety Issues, Functional Mobility    Safety Issues Affecting Function (Mobility) insight into deficits/self-awareness;awareness of need for assistance  -     Impairments Affecting Function (Mobility) balance;endurance/activity tolerance;strength  -CF           User Key  (r) = Recorded By, (t) = Taken By, (c) = Cosigned By    Initials Name Provider Type    CF Hetal Velasco PT Physical Therapist               Mobility     Row Name 09/02/22 1418          Bed Mobility    Bed Mobility supine-sit  -CF     Supine-Sit [place] (Bed Mobility) standby assist  -     Assistive Device (Bed Mobility) head of bed elevated;bed rails  -     Row Name 09/02/22 1418          Sit-Stand Transfer    Sit-Stand [place] (Transfers) minimum assist (75% patient effort)  -     Assistive Device (Sit-Stand Transfers) walker, front-wheeled  -CF     Row Name 09/02/22 1418          Gait/Stairs (Locomotion)    [place] Level (Gait) minimum assist (75% patient effort);1 person assist;verbal cues  -     Assistive Device (Gait) walker, front-wheeled  -     Distance in Feet (Gait) 20' with chair follow  -CF     Deviations/Abnormal Patterns (Gait) tess decreased;gait speed decreased;stride length decreased  -     Bilateral Gait Deviations forward flexed posture  -CF     Comment, (Gait/Stairs) Very flexed posture, chair follow for safety due to shaky legs. Cues to keep feet closer inside walker  -CF           User Key  (r) = Recorded By, (t) = Taken By, (c) = Cosigned By    Initials Name Provider Type    Hetal Davies PT  Physical Therapist               Obj/Interventions     Row Name 09/02/22 1421          Range of Motion Comprehensive    General Range of Motion bilateral lower extremity ROM WFL  -CF     Row Name 09/02/22 1421          Strength Comprehensive (MMT)    Comment, General Manual Muscle Testing (MMT) Assessment Generalized weakness present in BLE  -CF     Row Name 09/02/22 1421          Balance    Balance Assessment sitting static balance;sitting dynamic balance;standing static balance;standing dynamic balance  -CF     Static Sitting Balance standby assist  -CF     Dynamic Sitting Balance standby assist  -CF     Position, Sitting Balance sitting edge of bed  -CF     Static Standing Balance contact guard  -CF     Dynamic Standing Balance minimal assist  -CF     Position/Device Used, Standing Balance walker, front-wheeled  -CF     Row Name 09/02/22 1421          Sensory Assessment (Somatosensory)    Sensory Assessment (Somatosensory) sensation intact  -CF           User Key  (r) = Recorded By, (t) = Taken By, (c) = Cosigned By    Initials Name Provider Type    CF Hetal Velasco PT Physical Therapist               Goals/Plan    No documentation.                Clinical Impression     Row Name 09/02/22 1422          Pain    Pretreatment Pain Rating 0/10 - no pain  -CF     Posttreatment Pain Rating 0/10 - no pain  -CF     Row Name 09/02/22 1422          Plan of Care Review    Plan of Care Reviewed With patient  -CF     Outcome Evaluation Pt is an 83 yo female admitted with hypertension. PMH includes seizure, VBI, DM2, CKD, htn. Pt lives with her son, owns a WC and walker. She reports a fall in june and a recent SNF stay. Today, she demonstrates generalized weakness, decreased activity tolerance, balance impairments and general functional mobility deficits. She was sba for bed mobility, min A for sit<>stand and to ambulate 20'. Chair follow during ambulation for safety as pt appears unsteady, legs shaky and generally weak.  PT is recommending d/c to SNF at this time.  -CF     Row Name 09/02/22 1422          Therapy Assessment/Plan (PT)    Therapy Frequency (PT) 6 times/wk  -CF     Row Name 09/02/22 1422          Vital Signs    O2 Delivery Pre Treatment room air  -CF     O2 Delivery Intra Treatment room air  -CF     O2 Delivery Post Treatment room air  -CF     Row Name 09/02/22 1422          Positioning and Restraints    Pre-Treatment Position in bed  -CF     Post Treatment Position chair  -CF     In Chair reclined;call light within reach;encouraged to call for assist;exit alarm on;notified nsg;legs elevated  -CF           User Key  (r) = Recorded By, (t) = Taken By, (c) = Cosigned By    Initials Name Provider Type    Hetal Davies PT Physical Therapist               Outcome Measures     Row Name 09/02/22 1428          How much help from another person do you currently need...    Turning from your back to your side while in flat bed without using bedrails? 3  -CF     Moving from lying on back to sitting on the side of a flat bed without bedrails? 3  -CF     Moving to and from a bed to a chair (including a wheelchair)? 3  -CF     Standing up from a chair using your arms (e.g., wheelchair, bedside chair)? 3  -CF     Climbing 3-5 steps with a railing? 1  -CF     To walk in hospital room? 2  -CF     AM-PAC 6 Clicks Score (PT) 15  -CF     Highest level of mobility 4 --> Transferred to chair/commode  -CF     Row Name 09/02/22 1428          Functional Assessment    Outcome Measure Options AM-PAC 6 Clicks Basic Mobility (PT)  -CF           User Key  (r) = Recorded By, (t) = Taken By, (c) = Cosigned By    Initials Name Provider Type    Hetal Davies, MELODIE Physical Therapist                             Physical Therapy Education                 Title: PT OT SLP Therapies (In Progress)     Topic: Physical Therapy (In Progress)     Point: Mobility training (Done)     Learning Progress Summary           Patient Acceptance, E, VU,NR by  CF at 9/2/2022 1428                   Point: Home exercise program (Not Started)     Learner Progress:  Not documented in this visit.          Point: Body mechanics (Done)     Learning Progress Summary           Patient Acceptance, E, VU,NR by CF at 9/2/2022 1428                   Point: Precautions (Done)     Learning Progress Summary           Patient Acceptance, E, VU,NR by CF at 9/2/2022 1428                               User Key     Initials Effective Dates Name Provider Type Discipline     06/16/21 -  Hetal Velasco, PT Physical Therapist PT              PT Recommendation and Plan     Plan of Care Reviewed With: patient  Outcome Evaluation: Pt is an 81 yo female admitted with hypertension. PMH includes seizure, VBI, DM2, CKD, htn. Pt lives with her son, owns a WC and walker. She reports a fall in june and a recent SNF stay. Today, she demonstrates generalized weakness, decreased activity tolerance, balance impairments and general functional mobility deficits. She was sba for bed mobility, min A for sit<>stand and to ambulate 20'. Chair follow during ambulation for safety as pt appears unsteady, legs shaky and generally weak. PT is recommending d/c to SNF at this time.     Time Calculation:    PT Charges     Row Name 09/02/22 1413             Time Calculation    Start Time 1126  -CF      Stop Time 1157  -CF      Time Calculation (min) 31 min  -CF      PT Received On 09/02/22  -CF      PT - Next Appointment 09/03/22  -CF      PT Goal Re-Cert Due Date 09/16/22  -CF              Time Calculation- PT    Total Timed Code Minutes- PT 24 minute(s)  -CF            User Key  (r) = Recorded By, (t) = Taken By, (c) = Cosigned By    Initials Name Provider Type    CF Hetal Velasco PT Physical Therapist              Therapy Charges for Today     Code Description Service Date Service Provider Modifiers Qty    92643800883 HC PT EVAL MOD COMPLEXITY 3 9/2/2022 Hetal Velasco, PT GP 1    86683177590 HC PT  THERAPEUTIC ACT EA 15 MIN 9/2/2022 Hetal Velasco, PT GP 1          PT G-Codes  Outcome Measure Options: AM-PAC 6 Clicks Basic Mobility (PT)  AM-PAC 6 Clicks Score (PT): 15    Hetal Velasco, PT  9/2/2022

## 2022-09-02 NOTE — H&P
Patient Name:  Halie Guidry  YOB: 1940  MRN:  [id number]  Admit Date:  9/1/2022  Patient Care Team:  Napoleon Mead MD as PCP - General  Napoleon Mead MD as PCP - Family Medicine      Subjective   History Present Illness     Chief Complaint   Patient presents with   • Hypertension     History of Present Illness   Ms. Guidry is a 82 y.o. former smoker with a history of CVA, non-insulin-dependent type 2 diabetes, CKD stage IIIb, seizures, hypothyroidism, HLD, benign essential hypertension, and anxiety that presents to [facility] complaining of elevated blood pressure.  Patient was recently admitted to our service in June for hypoglycemia.  Upon discharge she went to subacute rehab.  Since discharge from rehab, she has been followed by [facility].  Patient reports that she was seen by [facility] today and it was noted that her blood pressure was elevated so she was told to go to the ED for further evaluation.  Patient reports for the past 6 months she has had issues with elevated blood pressure and has been in and out of the hospital.  She denies any shortness of breath, headaches, chest pain, dizziness, lightheadedness, nausea, or vomiting.  While in the emergency department, blood pressures got as high as 216/82.  She received several doses of hydralazine with no relief.  She has been placed on a Cardene drip and admitted to our service for further evaluation and treatment.    Review of Systems   Constitutional: Negative for chills and fever.   HENT: Negative for congestion and rhinorrhea.    Eyes: Negative for photophobia and visual disturbance.   Respiratory: Negative for cough and shortness of breath.    Cardiovascular: Negative for chest pain and palpitations.   Gastrointestinal: Negative for constipation, diarrhea, nausea and vomiting.   Endocrine: Negative for cold intolerance and heat intolerance.   Genitourinary: Positive for decreased urine volume. Negative  for difficulty urinating and dysuria.   Musculoskeletal: Negative for gait problem and joint swelling.   Skin: Negative for rash and wound.   Neurological: Negative for dizziness, light-headedness and headaches.   Psychiatric/Behavioral: Negative for sleep disturbance and suicidal ideas.        Personal History     Past Medical History:   Diagnosis Date   • Anxiety    • Arthritis    • Benign essential hypertension 08/20/2014   • 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HCC)    • Depression    • Diabetes (HCC)    • Diabetes mellitus, type 2 (HCC)    • Disc degeneration, lumbar    • Headache, tension-type    • Hyperlipidemia    • Hypothyroidism    • Neuropathy    • Osteoporosis 09/09/2015   • Peripheral neuropathy    • Rotator cuff tear, left    • Scoliosis    • Shoulder pain     LEFT, TORN ROTATOR CUFF S/P FALL   • Spinal stenosis      Past Surgical History:   Procedure Laterality Date   • APPENDECTOMY     • BILATERAL BREAST REDUCTION Bilateral 08/2015   • CATARACT EXTRACTION  03/2015   • CHOLECYSTECTOMY WITH INTRAOPERATIVE CHOLANGIOGRAM N/A 3/27/2022    Procedure: CHOLECYSTECTOMY LAPAROSCOPIC INTRAOPERATIVE CHOLANGIOGRAM;  Surgeon: Aiyana Hill MD;  Location: [facility];  Service: General;  Laterality: N/A;   • COLONOSCOPY  06/05/2015    WNL   • ERCP N/A 2/25/2022    Procedure: ENDOSCOPIC RETROGRADE CHOLANGIOPANCREATOGRAPHY with sphincterotomy and balloon sweep;  Surgeon: Chilo Wilhelm MD;  Location: [facility] ENDOSCOPY;  Service: Gastroenterology;  Laterality: N/A;  PRE/POST - CBD stones   • ERCP N/A 3/28/2022    Procedure: ENDOSCOPIC RETROGRADE CHOLANGIOPANCREATOGRAPHY WITH SPHINCTEROTOMY AND BALLOON SWEEP;  Surgeon: Chilo Wilhelm MD;  Location: [facility] ENDOSCOPY;  Service: Gastroenterology;  Laterality: N/A;  PRE: COMMON DUCT STONE  POST: COMMON DUCT STONE   • KYPHOPLASTY     • REDUCTION MAMMAPLASTY     • TONSILLECTOMY       Family History   Problem Relation Age of Onset   • Bone cancer Mother    • No Known Problems  Father      Social History     Tobacco Use   • Smoking status: Former Smoker     Packs/day: 1.00     Quit date:      Years since quittin.6   • Smokeless tobacco: Never Used   Vaping Use   • Vaping Use: Never used   Substance Use Topics   • Alcohol use: No   • Drug use: No     No current facility-administered medications on file prior to encounter.     Current Outpatient Medications on File Prior to Encounter   Medication Sig Dispense Refill   • acetaminophen (TYLENOL) 325 MG tablet Take 2 tablets by mouth Every 6 (Six) Hours As Needed for Mild Pain .     • amLODIPine (NORVASC) 5 MG tablet Take 1 tablet by mouth Daily.     • aspirin 81 MG EC tablet Take 1 tablet by mouth Daily.     • atorvastatin (LIPITOR) 80 MG tablet TAKE 1 TABLET EVERY NIGHT 90 tablet 3   • BD Pen Needle Naila 2nd Gen 32G X 4 MM misc USE TO INJECT INSULIN FOUR TIMES DAILY 200 each 0   • bisoprolol (ZEBeta) 5 MG tablet Take 0.5 tablets by mouth Daily. 90 tablet 3   • Dulaglutide (Trulicity) 1.5 MG/0.5ML solution pen-injector Inject 1.5 mg under the skin into the appropriate area as directed 1 (One) Time Per Week. 12 pen 2   • DULoxetine (CYMBALTA) 30 MG capsule Take 1 capsule by mouth Daily. 90 capsule 1   • famotidine (PEPCID) 20 MG tablet Take 1 tablet by mouth Daily Before Supper.     • hydroCHLOROthiazide (MICROZIDE) 12.5 MG capsule Take 12.5 mg by mouth Daily.     • HYDROcodone-acetaminophen (NORCO) 5-325 MG per tablet Take 1 tablet by mouth Every 6 (Six) Hours As Needed for Moderate Pain . 15 tablet 0   • hydrocortisone-zinc oxide-bacitracin-nystatin cream Apply 1 application topically to the appropriate area as directed 2 (Two) Times a Day As Needed (rash). 5 g 0   • Insulin Glargine, 1 Unit Dial, (Toujeo SoloStar) 300 UNIT/ML solution pen-injector injection Inject 18 Units under the skin into the appropriate area as directed Every Night. 27 mL 3   • Insulin Lispro, 1 Unit Dial, (HUMALOG) 100 UNIT/ML solution pen-injector Inject 6  Units under the skin into the appropriate area as directed 3 (Three) Times a Day With Meals. 15 pen 3   • levothyroxine (SYNTHROID, LEVOTHROID) 112 MCG tablet TAKE 1 TABLET DAILY 90 tablet 3   • lisinopril (PRINIVIL,ZESTRIL) 20 MG tablet Take 1 tablet by mouth Daily for 30 days. 30 tablet 0   • pregabalin (LYRICA) 50 MG capsule Take 1 capsule by mouth 3 (Three) Times a Day. 90 capsule 1   • vitamin B-12 (VITAMIN B-12) 1000 MCG tablet Take 1 tablet by mouth Daily.       Allergies   Allergen Reactions   • Sulfa Antibiotics Unknown - High Severity     Pt says it was a long time ago and I don't remember but it wasn't good.        Objective    Objective     Vital Signs  Temp:  [97.5 °F (36.4 °C)] 97.5 °F (36.4 °C)  Heart Rate:  [57-70] 70  Resp:  [18] 18  BP: (164-216)/(69-82) 187/76  SpO2:  [91 %-97 %] 95 %  on   ;   Device (Oxygen Therapy): room air  Body mass index is 27.37 kg/m².    Physical Exam  Vitals and nursing note reviewed.   Constitutional:       General: She is not in acute distress.     Appearance: She is well-developed. She is not toxic-appearing.      Comments: Chronically ill-appearing   HENT:      Head: Normocephalic and atraumatic.   Eyes:      General: No scleral icterus.        Right eye: No discharge.         Left eye: No discharge.      Conjunctiva/sclera: Conjunctivae normal.   Neck:      Vascular: No JVD.   Cardiovascular:      Rate and Rhythm: Normal rate and regular rhythm.      Heart sounds: Normal heart sounds. No murmur heard.    No friction rub. No gallop.      Comments:       Pulmonary:      Effort: Pulmonary effort is normal. No respiratory distress.      Breath sounds: Normal breath sounds. No wheezing or rales.   Abdominal:      General: Bowel sounds are normal. There is no distension.      Palpations: Abdomen is soft.      Tenderness: There is no abdominal tenderness. There is no guarding.   Musculoskeletal:         General: No tenderness or deformity. Normal range of motion.       Cervical back: Normal range of motion and neck supple.      Right lower le+ Edema present.      Left lower le+ Edema present.      Comments: RLE>LLE edema noted   Skin:     General: Skin is warm and dry.      Capillary Refill: Capillary refill takes less than 2 seconds.      Findings: Erythema ( RLE) present.   Neurological:      Mental Status: She is alert and oriented to person, place, and time.   Psychiatric:         Behavior: Behavior normal.       Results Review:  I reviewed the patient's new clinical results.  I reviewed the patient's new imaging results and agree with the interpretation.  I reviewed the patient's other test results and agree with the interpretation  I personally viewed and interpreted the patient's EKG/Telemetry data  Discussed with ED provider.    Lab Results (last 24 hours)     Procedure Component Value Units Date/Time    Comprehensive Metabolic Panel [[id number]]  (Abnormal) Collected: 22    Specimen: Blood from Arm, Right Updated: 22     Glucose 236 mg/dL      BUN 37 mg/dL      Creatinine 1.20 mg/dL      Sodium 137 mmol/L      Potassium 4.5 mmol/L      Comment: Slight hemolysis detected by analyzer. Results may be affected.        Chloride 102 mmol/L      CO2 21.3 mmol/L      Calcium 8.9 mg/dL      Total Protein 7.2 g/dL      Albumin 3.80 g/dL      ALT (SGPT) 9 U/L      AST (SGOT) 11 U/L      Alkaline Phosphatase 108 U/L      Total Bilirubin 0.4 mg/dL      Globulin 3.4 gm/dL      A/G Ratio 1.1 g/dL      BUN/Creatinine Ratio 30.8     Anion Gap 13.7 mmol/L      eGFR 45.3 mL/min/1.73      Comment: National Kidney Foundation and American Society of Nephrology (ASN) Task Force recommended calculation based on the Chronic Kidney Disease Epidemiology Collaboration (CKD-EPI) equation refit without adjustment for race.       Narrative:      GFR Normal >60  Chronic Kidney Disease <60  Kidney Failure <15      Troponin [[id number]]  (Normal) Collected: 22     Specimen: Blood from Arm, Right Updated: 09/01/22 2024     Troponin T <0.010 ng/mL     Narrative:      Troponin T Reference Range:  <= 0.03 ng/mL-   Negative for AMI  >0.03 ng/mL-     Abnormal for myocardial necrosis.  Clinicians would have to utilize clinical acumen, EKG, Troponin and serial changes to determine if it is an Acute Myocardial Infarction or myocardial injury due to an underlying chronic condition.       Results may be falsely decreased if patient taking Biotin.      CBC & Differential [[id number]]  (Abnormal) Collected: 09/01/22 2027    Specimen: Blood from Arm, Right Updated: 09/01/22 2040    Narrative:      The following orders were created for panel order CBC & Differential.  Procedure                               Abnormality         Status                     ---------                               -----------         ------                     CBC Auto Differential[[id number]]        Abnormal            Final result                 Please view results for these tests on the individual orders.    CBC Auto Differential [[id number]]  (Abnormal) Collected: 09/01/22 2027    Specimen: Blood from Arm, Right Updated: 09/01/22 2040     WBC 9.46 10*3/mm3      RBC 4.75 10*6/mm3      Hemoglobin 13.2 g/dL      Hematocrit 40.7 %      MCV 85.7 fL      MCH 27.8 pg      MCHC 32.4 g/dL      RDW 13.7 %      RDW-SD 42.5 fl      MPV 9.9 fL      Platelets 301 10*3/mm3      Neutrophil % 64.9 %      Lymphocyte % 20.9 %      Monocyte % 7.4 %      Eosinophil % 5.2 %      Basophil % 1.3 %      Immature Grans % 0.3 %      Neutrophils, Absolute 6.14 10*3/mm3      Lymphocytes, Absolute 1.98 10*3/mm3      Monocytes, Absolute 0.70 10*3/mm3      Eosinophils, Absolute 0.49 10*3/mm3      Basophils, Absolute 0.12 10*3/mm3      Immature Grans, Absolute 0.03 10*3/mm3      nRBC 0.0 /100 WBC           Imaging Results (Last 24 Hours)     Procedure Component Value Units Date/Time    XR Chest 1 View [[id number]] Collected: 09/01/22 1941      Updated: 09/01/22 1945    Narrative:      XR CHEST 1 VW-  09/01/2022     HISTORY: Hypertension.     Heart size is within normal limits. Lungs appear free of acute  infiltrates. Tiny calcified granulomas in the right midlung.     Bones and soft tissues are otherwise unremarkable except for some aortic  calcification.       Impression:      1. No acute process.     This report was finalized on 9/1/2022 7:42 PM by Dr. Mario Alberto Moura M.D.             Results for orders placed during the hospital encounter of 06/22/22    Adult transthoracic echo complete    Interpretation Summary  · Left ventricular wall thickness is consistent with mild concentric hypertrophy.  · Calculated left ventricular EF = 68% Estimated left ventricular EF was in agreement with the calculated left ventricular EF. Left ventricular systolic function is normal.  · Normal right ventricular cavity size and systolic function noted.  · The left atrial cavity is mildly dilated  · Saline test results are negative.  · Mild mitral valve regurgitation is present.  · There is no evidence of pericardial effusion      ECG 12 Lead   Final Result   HEART RATE= 56  bpm   RR Interval= 1071  ms   CT Interval= 152  ms   P Horizontal Axis= -71  deg   P Front Axis= 19  deg   QRSD Interval= 149  ms   QT Interval= 495  ms   QRS Axis= 20  deg   T Wave Axis= 45  deg   - ABNORMAL ECG -   Sinus rhythm   Probable left atrial enlargement   Right bundle branch block   No change from previous tracing   Electronically Signed By: Everton HernandezHALEY) ([facility]) 01-Sep-2022 20:14:53   Date and Time of Study: 2022-09-01 19:37:04           Assessment/Plan     Active Hospital Problems    Diagnosis  POA   • **Accelerated hypertension [I10]  Yes   • Anxiety disorder [F41.9]  Yes   • Seizure (HCC) [R56.9]  Yes   • Type 2 diabetes mellitus, with long-term current use of insulin (HCC) [E11.9, Z79.4]  Not Applicable   • Primary hypothyroidism [E03.9]  Yes   • Hyperlipidemia [E78.5]  Yes   •  Benign essential hypertension [I10]  Yes   • Stage 3b chronic kidney disease (HCC) [N18.32]  Yes      Resolved Hospital Problems   No resolved problems to display.       Ms. Guidry is a 82 y.o. former smoker with a history of type 2 diabetes, CKD stage IIIb, seizures, hypothyroidism, HLD, HTN, and anxiety who presents with accelerated hypertension.     Accelerated hypertension  -Patient been placed on a Cardene drip, will continue per hospital protocol  -Consult nephrology for blood pressure management  -Will review home regimen once MAR has been completed by nursing as patient is a poor historian and does not remember all of her medications.  -Patient does report decreased urine output.  Will check postvoid residual  -Check urine studies    Type 2 diabetes mellitus  -Patient was recently admitted to our service in June of this year for hypoglycemia.  Insulin was adjusted at this time.  -Accu-Cheks 4 times a day with meals and at bedtime  -Moderate dose correctional factor with hypoglycemic protocol  -Check hemoglobin A1c  -Hold oral diabetic medication    CKD stage III3b  -Creat 1.20, baseline trend 0.84-1.05  -As stated above, nephrology consultation  -Avoid nephrotoxins  -Trend BMP    History of chronic lacunar CVA  -Continue aspirin and statin    History of seizures  -Resume antiepileptics    HLD  -Resume statin therapy      I discussed the patient's findings and my recommendations with patient, ED provider and Dr. Sanchez.    VTE Prophylaxis -Lovenox.  Code Status - Full code.       SERGE King  [facility]  09/01/22  23:10 EDT

## 2022-09-02 NOTE — ED NOTES
Nursing report ED to floor  Halie Guidry  82 y.o.  female    HPI :   Chief Complaint   Patient presents with   • Hypertension       Admitting doctor:   Kelechi Sanchez MD    Admitting diagnosis:   The primary encounter diagnosis was Accelerated hypertension. Diagnoses of Dehydration, Hyperglycemia, and Tinnitus of both ears were also pertinent to this visit.    Code status:   Current Code Status     Date Active Code Status Order ID Comments User Context       Prior    Advance Care Planning Activity          Allergies:   Sulfa antibiotics    Intake and Output    Intake/Output Summary (Last 24 hours) at 9/1/2022 2307  Last data filed at 9/1/2022 2150  Gross per 24 hour   Intake 500 ml   Output --   Net 500 ml       Weight:       09/01/22 2027   Weight: 81.6 kg (180 lb)       Most recent vitals:   Vitals:    09/01/22 2204 09/01/22 2222 09/01/22 2304 09/01/22 2306   BP: (!) 216/82 (!) 193/69 (!) 187/76    BP Location:       Patient Position:       Pulse: 63 66 66 70   Resp:       Temp:       TempSrc:       SpO2:  96%  95%   Weight:       Height:           Active LDAs/IV Access:   Lines, Drains & Airways     Active LDAs     Name Placement date Placement time Site Days    Peripheral IV 09/01/22 1950 Distal;Right;Posterior Forearm 09/01/22 1950  Forearm  less than 1                Labs (abnormal labs have a star):   Labs Reviewed   COMPREHENSIVE METABOLIC PANEL - Abnormal; Notable for the following components:       Result Value    Glucose 236 (*)     BUN 37 (*)     Creatinine 1.20 (*)     CO2 21.3 (*)     BUN/Creatinine Ratio 30.8 (*)     eGFR 45.3 (*)     All other components within normal limits    Narrative:     GFR Normal >60  Chronic Kidney Disease <60  Kidney Failure <15     CBC WITH AUTO DIFFERENTIAL - Abnormal; Notable for the following components:    Eosinophils, Absolute 0.49 (*)     All other components within normal limits   TROPONIN (IN-HOUSE) - Normal    Narrative:     Troponin T Reference  Range:  <= 0.03 ng/mL-   Negative for AMI  >0.03 ng/mL-     Abnormal for myocardial necrosis.  Clinicians would have to utilize clinical acumen, EKG, Troponin and serial changes to determine if it is an Acute Myocardial Infarction or myocardial injury due to an underlying chronic condition.       Results may be falsely decreased if patient taking Biotin.     CBC AND DIFFERENTIAL    Narrative:     The following orders were created for panel order CBC & Differential.  Procedure                               Abnormality         Status                     ---------                               -----------         ------                     CBC Auto Differential[[id number]]        Abnormal            Final result                 Please view results for these tests on the individual orders.       EKG:   ECG 12 Lead   Final Result   HEART RATE= 56  bpm   RR Interval= 1071  ms   ID Interval= 152  ms   P Horizontal Axis= -71  deg   P Front Axis= 19  deg   QRSD Interval= 149  ms   QT Interval= 495  ms   QRS Axis= 20  deg   T Wave Axis= 45  deg   - ABNORMAL ECG -   Sinus rhythm   Probable left atrial enlargement   Right bundle branch block   No change from previous tracing   Electronically Signed By: Everton HernandezHALEY) ([facility]) 01-Sep-2022 20:14:53   Date and Time of Study: 2022-09-01 19:37:04          Meds given in ED:   Medications   niCARdipine (CARDENE) 25 mg in 250 mL NS infusion kit (5 mg/hr Intravenous New Bag 9/1/22 2304)   hydrALAZINE (APRESOLINE) injection 10 mg (10 mg Intravenous Given 9/1/22 2023)   sodium chloride 0.9 % bolus 500 mL (0 mL Intravenous Stopped 9/1/22 2150)   hydrALAZINE (APRESOLINE) injection 10 mg (10 mg Intravenous Given 9/1/22 2204)       Imaging results:  XR Chest 1 View    Result Date: 9/1/2022  1. No acute process.  This report was finalized on 9/1/2022 7:42 PM by Dr. Mario Alberto Moura M.D.        Ambulatory status:   - ad david    Social issues:   Social History     Socioeconomic History   •  Marital status:    Tobacco Use   • Smoking status: Former Smoker     Packs/day: 1.00     Quit date:      Years since quittin.6   • Smokeless tobacco: Never Used   Vaping Use   • Vaping Use: Never used   Substance and Sexual Activity   • Alcohol use: No   • Drug use: No   • Sexual activity: Defer       NIH Stroke Scale:        Nursing report ED to floor:

## 2022-09-02 NOTE — CONSULTS
[facility] Consult Note      Patient Name: Halie Guidry  : 1940  MRN: [id number]  Primary Care Physician:  Napoleon Mead MD  Referring Physician: Kelechi Sanchez MD  Date of admission: 2022    Subjective     Reason for Consult: Evaluation and management of hypertension and mild chronic kidney disease    HPI:   Halie Guidry is a 82 y.o. female who got admitted on 2022.    Patient has a known history of hypertension, hyperlipidemia, hypothyroidism, seizures and CKD stage III.  Her baseline creatinine is 1.0-1.2.  She contacted her primary care physician's office yesterday due to high systolic blood pressure.  It was greater than 200 subsequently she was advised to come to emergency room.  In the ER, she was found to have high blood pressure and was initiated on Cardene drip and she has no specific complaints.    She complains of left shoulder pain due to previous rotator cuff injury.  She also has bilateral pain in both knees and ankles but this is chronic.  She denies having any fever or chills.  No urinary symptoms.  She denies having any headache or blurred vision.     Review of Systems:   14 point review of systems is otherwise negative except for mentioned above on HPI    Personal History     Past Medical History:   Diagnosis Date   • Anxiety    • Arthritis    • Benign essential hypertension 2014   • 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HCC)    • Depression    • Diabetes (HCC)    • Diabetes mellitus, type 2 (HCC)    • Disc degeneration, lumbar    • Headache, tension-type    • Hyperlipidemia    • Hypothyroidism    • Neuropathy    • Osteoporosis 2015   • Peripheral neuropathy    • Rotator cuff tear, left    • Scoliosis    • Shoulder pain     LEFT, TORN ROTATOR CUFF S/P FALL   • Spinal stenosis        Past Surgical History:   Procedure Laterality Date   • APPENDECTOMY     • BILATERAL BREAST REDUCTION Bilateral 2015   • CATARACT EXTRACTION   03/2015   • CHOLECYSTECTOMY WITH INTRAOPERATIVE CHOLANGIOGRAM N/A 3/27/2022    Procedure: CHOLECYSTECTOMY LAPAROSCOPIC INTRAOPERATIVE CHOLANGIOGRAM;  Surgeon: Aiyana Hill MD;  Location: [facility] MAIN OR;  Service: General;  Laterality: N/A;   • COLONOSCOPY  06/05/2015    WNL   • ERCP N/A 2/25/2022    Procedure: ENDOSCOPIC RETROGRADE CHOLANGIOPANCREATOGRAPHY with sphincterotomy and balloon sweep;  Surgeon: Chilo Wilhelm MD;  Location: [facility] ENDOSCOPY;  Service: Gastroenterology;  Laterality: N/A;  PRE/POST - CBD stones   • ERCP N/A 3/28/2022    Procedure: ENDOSCOPIC RETROGRADE CHOLANGIOPANCREATOGRAPHY WITH SPHINCTEROTOMY AND BALLOON SWEEP;  Surgeon: Chilo Wilhelm MD;  Location: [facility] ENDOSCOPY;  Service: Gastroenterology;  Laterality: N/A;  PRE: COMMON DUCT STONE  POST: COMMON DUCT STONE   • KYPHOPLASTY     • REDUCTION MAMMAPLASTY     • TONSILLECTOMY         Family History: family history includes Bone cancer in her mother; No Known Problems in her father.    Social History:  reports that she quit smoking about 9 years ago. She smoked 1.00 pack per day. She has never used smokeless tobacco. She reports that she does not drink alcohol and does not use drugs.    Home Medications:  Prior to Admission medications    Medication Sig Start Date End Date Taking? Authorizing Provider   atorvastatin (LIPITOR) 80 MG tablet TAKE 1 TABLET EVERY NIGHT 10/5/21  Yes Solomon Barrow MD   BD Pen Needle Naila 2nd Gen 32G X 4 MM misc USE TO INJECT INSULIN FOUR TIMES DAILY 8/22/22  Yes Solomon Barrow MD   bisoprolol (ZEBeta) 5 MG tablet Take 0.5 tablets by mouth Daily. 3/31/22  Yes Balbina Painting MD   Dulaglutide (Trulicity) 1.5 MG/0.5ML solution pen-injector Inject 1.5 mg under the skin into the appropriate area as directed 1 (One) Time Per Week.  Patient taking differently: Inject 1.5 mg under the skin into the appropriate area as directed 1 (One) Time Per Week. sunday 1/26/22  Yes Solomon Barrow MD    DULoxetine (CYMBALTA) 30 MG capsule Take 1 capsule by mouth Daily. 6/2/22  Yes Napoleon Mead MD   HYDROcodone-acetaminophen (NORCO) 5-325 MG per tablet Take 1 tablet by mouth Every 6 (Six) Hours As Needed for Moderate Pain . 6/24/22  Yes Nolan Upton MD   hydrocortisone-zinc oxide-bacitracin-nystatin cream Apply 1 application topically to the appropriate area as directed 2 (Two) Times a Day As Needed (rash). 5/26/22  Yes Gopi Fiore MD   Insulin Glargine, 2 Unit Dial, (TOUJEO) 300 UNIT/ML solution pen-injector injection Inject 60 Units under the skin into the appropriate area as directed Every Night.   Yes Melvin Gusman MD   Insulin Lispro, 1 Unit Dial, (HUMALOG) 100 UNIT/ML solution pen-injector Inject 6 Units under the skin into the appropriate area as directed 3 (Three) Times a Day With Meals. 6/24/22  Yes Nolan Upton MD   lansoprazole (PREVACID) 15 MG capsule Take 15 mg by mouth Daily.   Yes ProviderMelvin MD   levothyroxine (SYNTHROID, LEVOTHROID) 112 MCG tablet TAKE 1 TABLET DAILY 8/24/22  Yes Kenya Pruett APRN   lisinopril (PRINIVIL,ZESTRIL) 20 MG tablet Take 1 tablet by mouth Daily for 30 days. 6/24/22 9/2/22 Yes Nolan Upton MD   pregabalin (LYRICA) 50 MG capsule Take 1 capsule by mouth 3 (Three) Times a Day. 6/24/22  Yes Nolan Upton MD   acetaminophen (TYLENOL) 325 MG tablet Take 2 tablets by mouth Every 6 (Six) Hours As Needed for Mild Pain . 3/4/22   Aman Vaughn MD   amLODIPine (NORVASC) 5 MG tablet Take 1 tablet by mouth Daily. 6/25/22   Nolan Upton MD   aspirin 81 MG EC tablet Take 1 tablet by mouth Daily. 6/25/22   Nolan Upton MD   famotidine (PEPCID) 20 MG tablet Take 1 tablet by mouth Daily Before Supper. 3/4/22   Aman Vaughn MD   hydroCHLOROthiazide (MICROZIDE) 12.5 MG capsule Take 12.5 mg by mouth Daily.  Patient not taking: Reported on 9/2/2022    Provider, MD Melvin    Insulin Glargine, 1 Unit Dial, (Toujeo SoloStar) 300 UNIT/ML solution pen-injector injection Inject 18 Units under the skin into the appropriate area as directed Every Night.  Patient not taking: Reported on 9/2/2022 6/24/22   Nolan Upton MD   vitamin B-12 (VITAMIN B-12) 1000 MCG tablet Take 1 tablet by mouth Daily.  Patient not taking: Reported on 9/2/2022 6/25/22   Nolan Upton MD       Allergies:  Allergies   Allergen Reactions   • Sulfa Antibiotics Unknown - High Severity     Pt says it was a long time ago and I don't remember but it wasn't good.      Objective     Vitals:   Temp:  [97.5 °F (36.4 °C)-98.7 °F (37.1 °C)] 98 °F (36.7 °C)  Heart Rate:  [57-84] 65  Resp:  [18-20] 20  BP: (137-216)/(58-82) 150/62    Intake/Output Summary (Last 24 hours) at 9/2/2022 0905  Last data filed at 9/2/2022 0831  Gross per 24 hour   Intake 1000 ml   Output 900 ml   Net 100 ml       Physical Exam:   Constitutional: Awake, alert, no acute distress.  HEENT: Sclera anicteric, no conjunctival injection  Neck: Supple, no thyromegaly, no lymphadenopathy, trachea at midline, no JVD  Respiratory: Clear to auscultation bilaterally, nonlabored respiration  Cardiovascular: RRR, no murmurs, no rubs or gallops, no carotid bruit  Gastrointestinal: Positive bowel sounds, abdomen is soft, nontender and nondistended  : No palpable bladder  Musculoskeletal: No edema, no clubbing or cyanosis  Psychiatric: Appropriate affect, cooperative  Neurologic: Oriented x3, moving all extremities, normal speech and mental status  Skin: Warm and dry     Scheduled Meds:     atorvastatin, 80 mg, Oral, Nightly  bisoprolol, 2.5 mg, Oral, Daily  DULoxetine, 30 mg, Oral, Daily  insulin glargine, 30 Units, Subcutaneous, Nightly  levothyroxine, 112 mcg, Oral, Daily  pantoprazole, 40 mg, Oral, QAM  pregabalin, 50 mg, Oral, TID      IV Meds:   niCARdipine, 5-15 mg/hr, Last Rate: 5 mg/hr (09/02/22 4412)      Results Reviewed:   I have  personally reviewed the results from the time of this admission to 9/2/2022 09:05 EDT     Lab Results   Component Value Date    GLUCOSE 236 (H) 09/01/2022    CALCIUM 8.9 09/01/2022     09/01/2022    K 4.5 09/01/2022    CO2 21.3 (L) 09/01/2022     09/01/2022    BUN 37 (H) 09/01/2022    CREATININE 1.20 (H) 09/01/2022    EGFRIFAFRI 50 (L) 01/18/2021    EGFRIFNONA 51 (L) 02/28/2022    BCR 30.8 (H) 09/01/2022    ANIONGAP 13.7 09/01/2022      Lab Results   Component Value Date    MG 1.8 05/27/2022    ALBUMIN 3.80 09/01/2022           Assessment / Plan     ASSESSMENT:  - Chronic kidney disease stage III, baseline creatinine 1.0-1.2, stable kidney function  - Uncontrolled hypertension  - Diabetes with hemoglobin A1c of 7.7  - Iron deficiency with acceptable hemoglobin    PLAN:  -Check urine protein to creatinine ratio  -Check renal ultrasound  -We will add IV hydralazine as needed and start oral lisinopril 10 mg daily.  The dose of Bisoprolol will be increased to 5 mg daily.  I will monitor the blood pressure closely and adjust medications as needed.  IV Cardene drip will be tapered down slowly  -Monitor kidney function closely  -The patient will need to follow a low-salt diet    Thank you for involving us in the care of Halie Guidry.  Please feel free to call with any questions.    Geraldine Zhou MD  09/02/22  09:05 EDT    [facility]  [phone]      Much of this encounter note is an electronic transcription/translation of spoken language to printed text. The electronic translation of spoken language may permit erroneous, or at times, nonsensical words or phrases to be inadvertently transcribed; Although I have reviewed the note for such errors, some may still exist.

## 2022-09-02 NOTE — DISCHARGE PLACEMENT REQUEST
"Tabatha Guidry (82 y.o. Female)             Date of Birth   1940    Social Security Number       Address   [address] [place] [address] [address] [place] [address]    Home Phone   [phone]    MRN   [id number]       [employer]   [employer]    Marital Status                               Admission Date   9/1/22    Admission Type   Emergency    Admitting Provider   Kelechi Sanchez MD    Attending Provider   Lewis Sheppard MD    Department, Room/Bed   [address], [address]       Discharge Date       Discharge Disposition       Discharge Destination                               Attending Provider: Lewis Sheppard MD    Allergies: Sulfa Antibiotics    Isolation: None   Infection: None   Code Status: CPR   Advance Care Planning Activity    Ht: 172.7 cm (68\")   Wt: 81.6 kg (180 lb)    Admission Cmt: None   Principal Problem: Accelerated hypertension [I10]                 Active Insurance as of 9/1/2022     Primary Coverage     Payor Plan Insurance Group Employer/Plan Group    [facility] MEDICARE REPLACEMENT [facility] MEDICARE REPLACEMENT [id number]     Payor Plan Address Payor Plan Phone Number Payor Plan Fax Number Effective Dates    [address] [address]   1/1/2016 - None Entered    [facility] [id number]       Subscriber Name Subscriber Birth Date Member ID       TABATHA GUIDRY 1940 [id number]                 Emergency Contacts      (Rel.) Home Phone Work Phone Mobile Phone    Derrick Guidry (Son) [phone] -- [phone]    Josse Guidry (Son) [phone] -- [phone]          "

## 2022-09-02 NOTE — PLAN OF CARE
Goal Outcome Evaluation:         Pt's B/P stable w/'s 150's for a couple hrs. Gtt decreased to 5 mg/hr at 0330. @0100 Pt voided, then was bladder scanned which showed 530 ml x2. @0140 Straight cathed done and 500 was withdrawn. Pt hasn't voided since.    Blood sugar in 300's, No SSI ordered , call has been placed to [facility] for orders.

## 2022-09-02 NOTE — THERAPY EVALUATION
Patient Name: Halie Guidry  : 1940    MRN: [id number]                              Today's Date: 2022       Admit Date: 2022    Visit Dx:     ICD-10-CM ICD-9-CM   1. Accelerated hypertension  I10 401.0   2. Dehydration  E86.0 276.51   3. Hyperglycemia  R73.9 790.29   4. Tinnitus of both ears  H93.13 388.30     Patient Active Problem List   Diagnosis   • Compression fracture   • Lumbar degenerative disc disease   • Type 2 diabetes mellitus, with long-term current use of insulin ([facility])   • Hyperlipidemia   • Benign essential hypertension   • Primary hypothyroidism   • Leukocytosis   • Neuropathy   • Osteoporosis   • Proteinuria   • Tobacco abuse   • Diabetic eye exam ([facility])   • Generalized weakness   • Spinal stenosis   • Scoliosis   • Arthritis   • VBI (vertebrobasilar insufficiency)   • Orthostatic hypotension   • 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 ([facility])   • Severe hypothyroidism   • Noncompliance with medication regimen   • Vitamin D deficiency disease   • Altered mental status   • Tremor   • Seizure ([facility])   • Stage 3a chronic kidney disease ([facility])   • Thoracic degenerative disc disease   • Metabolic encephalopathy   • VIPUL (acute kidney injury) ([facility])   • Hyperglycemia   • Type II diabetes mellitus, uncontrolled   • Lower abdominal pain   • Transaminitis   • COVID-19 virus detected   • UTI (urinary tract infection)   • Emphysematous cystitis   • Choledocholithiasis   • Hypokalemia   • Hypoxia   • Generalized abdominal pain   • History of Clostridium difficile infection   • History of ERCP   • Acute UTI (urinary tract infection)   • Hypomagnesemia   • Burning pain   • Anxiety disorder   • Neuropathic pain   • Intertrigo   • Weakness of both lower extremities   • Acute metabolic encephalopathy   • Accelerated hypertension     Past Medical History:   Diagnosis Date   • Anxiety    • Arthritis    • Benign essential hypertension 2014   • 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[facility])    •  Depression    • Diabetes (HCC)    • Diabetes mellitus, type 2 (HCC)    • Disc degeneration, lumbar    • Headache, tension-type    • Hyperlipidemia    • Hypothyroidism    • Neuropathy    • Osteoporosis 09/09/2015   • Peripheral neuropathy    • Rotator cuff tear, left    • Scoliosis    • Shoulder pain     LEFT, TORN ROTATOR CUFF S/P FALL   • Spinal stenosis      Past Surgical History:   Procedure Laterality Date   • APPENDECTOMY     • BILATERAL BREAST REDUCTION Bilateral 08/2015   • CATARACT EXTRACTION  03/2015   • CHOLECYSTECTOMY WITH INTRAOPERATIVE CHOLANGIOGRAM N/A 3/27/2022    Procedure: CHOLECYSTECTOMY LAPAROSCOPIC INTRAOPERATIVE CHOLANGIOGRAM;  Surgeon: Aiyana Hill MD;  Location: [facility] MAIN OR;  Service: General;  Laterality: N/A;   • COLONOSCOPY  06/05/2015    WNL   • ERCP N/A 2/25/2022    Procedure: ENDOSCOPIC RETROGRADE CHOLANGIOPANCREATOGRAPHY with sphincterotomy and balloon sweep;  Surgeon: Chilo Wilhelm MD;  Location: [facility] ENDOSCOPY;  Service: Gastroenterology;  Laterality: N/A;  PRE/POST - CBD stones   • ERCP N/A 3/28/2022    Procedure: ENDOSCOPIC RETROGRADE CHOLANGIOPANCREATOGRAPHY WITH SPHINCTEROTOMY AND BALLOON SWEEP;  Surgeon: Chilo Wilhelm MD;  Location: [facility] ENDOSCOPY;  Service: Gastroenterology;  Laterality: N/A;  PRE: COMMON DUCT STONE  POST: COMMON DUCT STONE   • KYPHOPLASTY     • REDUCTION MAMMAPLASTY     • TONSILLECTOMY        General Information     Row Name 09/02/22 1547          OT Time and Intention    Document Type evaluation  -JW     Mode of Treatment co-treatment;occupational therapy;physical therapy  -     Row Name 09/02/22 1547          General Information    Patient Profile Reviewed yes  -JW     Prior Level of Function independent:;ADL's;all household mobility  indep with ADLs and fxl mobility with use of RW. Recent SNF  -JW     Existing Precautions/Restrictions fall  -JW     Barriers to Rehab none identified  -JW     Row Name 09/02/22 1547          Living  Environment    People in Home child(beatriz), adult  son  -[facility] Name 09/02/22 1547          Home Main Entrance    Number of Stairs, Main Entrance none  -[facility] Name 09/02/22 1547          Cognition    Orientation Status (Cognition) oriented x 3  -[facility] Name 09/02/22 1547          Safety Issues, Functional Mobility    Impairments Affecting Function (Mobility) balance;endurance/activity tolerance;strength  -           User Key  (r) = Recorded By, (t) = Taken By, (c) = Cosigned By    Initials Name Provider Type    Dory Qureshi OT Occupational Therapist                 Mobility/ADL's     [facility] Name 09/02/22 1548          Bed Mobility    Bed Mobility supine-sit  -     Supine-Sit [place] (Bed Mobility) standby assist  -     Assistive Device (Bed Mobility) head of bed elevated;bed rails  -JW     Row Name 09/02/22 1548          Transfers    Transfers sit-stand transfer  -     Sit-Stand [place] (Transfers) minimum assist (75% patient effort)  -JW     Row Name 09/02/22 1548          Sit-Stand Transfer    Assistive Device (Sit-Stand Transfers) walker, front-wheeled  -JW     Row Name 09/02/22 1548          Functional Mobility    Functional Mobility- Ind. Level minimum assist (75% patient effort);verbal cues required  -     Functional Mobility- Device walker, front-wheeled  -     Functional Mobility- Comment cues to stay inside RW  -[facility] Name 09/02/22 1548          Activities of Daily Living    BADL Assessment/Intervention upper body dressing  -[facility] Name 09/02/22 1548          Upper Body Dressing Assessment/Training    [place] Level (Upper Body Dressing) set up;standby assist;doff;don;front opening garment  -     Position (Upper Body Dressing) edge of bed sitting  -           User Key  (r) = Recorded By, (t) = Taken By, (c) = Cosigned By    Initials Name Provider Type    Dory Qureshi OT Occupational Therapist               Obj/Interventions     Row Name 09/02/22 1549           Sensory Assessment (Somatosensory)    Sensory Assessment (Somatosensory) UE sensation intact  -JW     Row Name 09/02/22 1549          Vision Assessment/Intervention    Visual Impairment/Limitations WNL  -JW     Row Name 09/02/22 1549          Range of Motion Comprehensive    Comment, General Range of Motion RUE WFL, L shoulder with limited flexion ~90 deg  -JW     Row Name 09/02/22 1549          Strength Comprehensive (MMT)    Comment, General Manual Muscle Testing (MMT) Assessment generalized weakness  -JW     Row Name 09/02/22 1549          Motor Skills    Motor Skills functional endurance  -     Functional Endurance fair, somewhat fatigued s/p ambulation in room  -JW     Row Name 09/02/22 1549          Balance    Static Sitting Balance supervision  -     Dynamic Sitting Balance standby assist  -     Position, Sitting Balance sitting edge of bed;unsupported  -     Static Standing Balance contact guard  -     Dynamic Standing Balance minimal assist  -     Position/Device Used, Standing Balance walker, front-wheeled  -     Balance Interventions sitting;standing  -     Comment, Balance kyphotic posture with ambulation  -           User Key  (r) = Recorded By, (t) = Taken By, (c) = Cosigned By    Initials Name Provider Type    Dory Qureshi OT Occupational Therapist               Goals/Plan     Row Name 09/02/22 1553          Transfer Goal 1 (OT)    Activity/Assistive Device (Transfer Goal 1, OT) transfers, all;bed-to-chair/chair-to-bed;toilet;shower chair  -     [place] Level/Cues Needed (Transfer Goal 1, OT) supervision required;modified independence  -     Time Frame (Transfer Goal 1, OT) short term goal (STG);2 weeks  -     Progress/Outcome (Transfer Goal 1, OT) goal ongoing  -JW     Row Name 09/02/22 1553          Dressing Goal 1 (OT)    Activity/Device (Dressing Goal 1, OT) dressing skills, all  -     [place]/Cues Needed (Dressing Goal 1, OT) set-up required  -      Time Frame (Dressing Goal 1, OT) short term goal (STG);2 weeks  -JW     Progress/Outcome (Dressing Goal 1, OT) goal ongoing  -[facility] Name 09/02/22 1553          Toileting Goal 1 (OT)    Activity/Device (Toileting Goal 1, OT) toileting skills, all  -JW     [place] Level/Cues Needed (Toileting Goal 1, OT) supervision required;modified independence  -JW     Time Frame (Toileting Goal 1, OT) short term goal (STG);2 weeks  -JW     Progress/Outcome (Toileting Goal 1, OT) goal ongoing  -[facility] Name 09/02/22 1553          Grooming Goal 1 (OT)    Activity/Device (Grooming Goal 1, OT) grooming skills, all  -JW     [place] (Grooming Goal 1, OT) independent  -     Time Frame (Grooming Goal 1, OT) short term goal (STG);2 weeks  -JW     Progress/Outcome (Grooming Goal 1, OT) goal ongoing  -JW     Row Name 09/02/22 1553          Problem Specific Goal 1 (OT)    Problem Specific Goal 1 (OT) Pt will participate in UB AROM/therex to promote increased fxl use for ADLs and mobility  -     Time Frame (Problem Specific Goal 1, OT) short term goal (STG);2 weeks  -JW     Progress/Outcome (Problem Specific Goal 1, OT) goal ongoing  -JW     Row Name 09/02/22 1553          Therapy Assessment/Plan (OT)    Planned Therapy Interventions (OT) functional balance retraining;activity tolerance training;BADL retraining;occupation/activity based interventions;patient/caregiver education/training;ROM/therapeutic exercise;strengthening exercise;transfer/mobility retraining  -           User Key  (r) = Recorded By, (t) = Taken By, (c) = Cosigned By    Initials Name Provider Type     Dory Daniels OT Occupational Therapist               Clinical Impression     [facility] Name 09/02/22 1551          Pain Assessment    Pretreatment Pain Rating 0/10 - no pain  -     Posttreatment Pain Rating 0/10 - no pain  -JW     Row Name 09/02/22 1551          Plan of Care Review    Plan of Care Reviewed With patient  -     Outcome Evaluation Pt is an  83 yo female admitted with hypertension. PMH includes seizure, VBI, DM2, CKD, htn. Pt lives with her son, owns a WC and walker. She reports a fall in june and a recent SNF stay. She presents today AOX4, pleasant and agreeable. She demo's generalized weakness, act tolerance/endurance and dec balance with fxl mobility. EOB with SBA, UB dressing with set-up/SBA. STS and fxl ambulation using RW with [place]n, forward flexed posture with difficulty correcting and somewhat unsteady. Pt TF to bedside chair. Will benefit from cont OT services in the acute care setting to address fxl deficits, recommend d/c SNF to promote return to PLOF.  -     Row Name 09/02/22 2787          Therapy Assessment/Plan (OT)    Rehab Potential (OT) good, to achieve stated therapy goals  -     Criteria for Skilled Therapeutic Interventions Met (OT) yes;skilled treatment is necessary  -     Therapy Frequency (OT) 5 times/wk  -     Row Name 09/02/22 0315          Therapy Plan Review/Discharge Plan (OT)    Anticipated Discharge Disposition (OT) skilled nursing facility  -     Row Name 09/02/22 4837          Positioning and Restraints    Pre-Treatment Position in bed  -     Post Treatment Position chair  -JW     In Chair notified nsg;sitting;call light within reach;encouraged to call for assist;exit alarm on;legs elevated  -           User Key  (r) = Recorded By, (t) = Taken By, (c) = Cosigned By    Initials Name Provider Type     Dory Daniels OT Occupational Therapist               Outcome Measures     Row Name 09/02/22 9256          How much help from another is currently needed...    Putting on and taking off regular lower body clothing? 2  -JW     Bathing (including washing, rinsing, and drying) 2  -JW     Toileting (which includes using toilet bed pan or urinal) 2  -JW     Putting on and taking off regular upper body clothing 3  -JW     Taking care of personal grooming (such as brushing teeth) 3  -JW     Eating meals 4  -JW      AM-PAC 6 Clicks Score (OT) 16  -JW     Row Name 09/02/22 1428          How much help from another person do you currently need...    Turning from your back to your side while in flat bed without using bedrails? 3  -CF     Moving from lying on back to sitting on the side of a flat bed without bedrails? 3  -CF     Moving to and from a bed to a chair (including a wheelchair)? 3  -CF     Standing up from a chair using your arms (e.g., wheelchair, bedside chair)? 3  -CF     Climbing 3-5 steps with a railing? 1  -CF     To walk in hospital room? 2  -CF     AM-PAC 6 Clicks Score (PT) 15  -CF     Highest level of mobility 4 --> Transferred to chair/commode  -     Row Name 09/02/22 1555 09/02/22 1428       Functional Assessment    Outcome Measure Options AM-PAC 6 Clicks Daily Activity (OT)  - AM-PAC 6 Clicks Basic Mobility (PT)  -CF          User Key  (r) = Recorded By, (t) = Taken By, (c) = Cosigned By    Initials Name Provider Type    CF Hetal Velasco, PT Physical Therapist    Dory Qureshi, TOMÁS Occupational Therapist                Occupational Therapy Education                 Title: PT OT SLP Therapies (In Progress)     Topic: Occupational Therapy (In Progress)     Point: ADL training (Done)     Description:   Instruct learner(s) on proper safety adaptation and remediation techniques during self care or transfers.   Instruct in proper use of assistive devices.              Learning Progress Summary           Patient Acceptance, E, VU by AVELINO at 9/2/2022 6161                   Point: Home exercise program (Not Started)     Description:   Instruct learner(s) on appropriate technique for monitoring, assisting and/or progressing therapeutic exercises/activities.              Learner Progress:  Not documented in this visit.          Point: Precautions (Done)     Description:   Instruct learner(s) on prescribed precautions during self-care and functional transfers.              Learning Progress Summary            Patient Acceptance, E, VU by  at 9/2/2022 1555                   Point: Body mechanics (Done)     Description:   Instruct learner(s) on proper positioning and spine alignment during self-care, functional mobility activities and/or exercises.              Learning Progress Summary           Patient Acceptance, E, VU by  at 9/2/2022 1555                               User Key     Initials Effective Dates Name Provider Type Discipline     06/10/21 -  Dory Daniels, TOMÁS Occupational Therapist OT              OT Recommendation and Plan  Planned Therapy Interventions (OT): functional balance retraining, activity tolerance training, BADL retraining, occupation/activity based interventions, patient/caregiver education/training, ROM/therapeutic exercise, strengthening exercise, transfer/mobility retraining  Therapy Frequency (OT): 5 times/wk  Plan of Care Review  Plan of Care Reviewed With: patient  Outcome Evaluation: Pt is an 81 yo female admitted with hypertension. PMH includes seizure, VBI, DM2, CKD, htn. Pt lives with her son, owns a WC and walker. She reports a fall in june and a recent SNF stay. She presents today AOX4, pleasant and agreeable. She demo's generalized weakness, act tolerance/endurance and dec balance with fxl mobility. EOB with SBA, UB dressing with set-up/SBA. STS and fxl ambulation using RW with [place], forward flexed posture with difficulty correcting and somewhat unsteady. Pt TF to bedside chair. Will benefit from cont OT services in the acute care setting to address fxl deficits, recommend d/c SNF to promote return to PLOF.     Time Calculation:    Time Calculation- OT     Row Name 09/02/22 1556             Time Calculation-     OT Start Time 1127  -      OT Stop Time 1157  -      OT Time Calculation (min) 30 min  -      Total Timed Code Minutes- OT 23 minute(s)  -      OT Received On 09/02/22  -      OT - Next Appointment 09/05/22  -      OT Goal Re-Cert Due Date 09/16/22  -               Timed Charges    27225 - OT Therapeutic Activity Minutes 23  -JW              Untimed Charges    OT Eval/Re-eval Minutes 7  -JW              Total Minutes    Timed Charges Total Minutes 23  -JW      Untimed Charges Total Minutes 7  -JW       Total Minutes 30  -JW            User Key  (r) = Recorded By, (t) = Taken By, (c) = Cosigned By    Initials Name Provider Type    Dory Qureshi OT Occupational Therapist              Therapy Charges for Today     Code Description Service Date Service Provider Modifiers Qty    [id number]  OT THERAPEUTIC ACT EA 15 MIN 9/2/2022 Dory Daniels OT GO 1    [id number]  OT EVAL MOD COMPLEXITY 3 9/2/2022 Dory Daniels OT GO 1               Dory Daniels OT  9/2/2022

## 2022-09-02 NOTE — CASE MANAGEMENT/SOCIAL WORK
Discharge Planning Assessment  [facility]     Patient Name: Halie Guidry  MRN: [id number]  Today's Date: 9/2/2022    Admit Date: 9/1/2022     Discharge Needs Assessment     Row Name 09/02/22 1729       Living Environment    People in Home child(beatriz), adult    Name(s) of People in Home son Derrick [phone]    Current Living Arrangements home    Primary Care Provided by self    Provides Primary Care For no one    Family Caregiver if Needed child(beatriz), adult    Family Caregiver Names Derrick Guidry    Able to Return to Prior Arrangements yes       Resource/Environmental Concerns    Resource/Environmental Concerns none       Transition Planning    Patient/Family Anticipates Transition to home with family;home with help/services    Patient/Family Anticipated Services at Transition home health care    Transportation Anticipated family or friend will provide       Discharge Needs Assessment    Readmission Within the Last 30 Days no previous admission in last 30 days    Equipment Currently Used at Home walker, rolling;shower chair    Concerns to be Addressed denies needs/concerns at this time    Anticipated Changes Related to Illness none    Equipment Needed After Discharge none    Discharge Facility/Level of Care Needs home with home health               Discharge Plan     Row Name 09/02/22 0877       Plan    Plan Return home. Current with [facility]    Patient/Family in Agreement with Plan yes    Plan Comments CCP met with patient at bedside. Introduced self and explained role. Patient lives in a single story condo with no exterior steps. Her son Derrick lives with her. She is mostly IADL and Derrick provides transportation as needed. Patient sees Dr. Mead for PCP and fills prescriptions at [facility]. She states she does have AD/LW documents and CCP encouraged patient to bring a copy to the hospital to place on file. For DME patient has a walker, rollator, and wheelchair. [facility] and has been to [facility], and [facility] for SNF in the past. At discharge she plans to return home and continue services with [facility]. Family can transport. She denies any discharge planning needs. Micki HUSAIN RN/CCP              Continued Care and Services - Admitted Since 9/1/2022     Home Medical Care Coordination complete.    Service Provider Request Status Selected Services Address Phone Fax Patient Preferred    [facility] [address], [address], [place] [address] [phone] --                 Demographic Summary     Row Name 09/02/22 1729       General Information    Admission Type observation    Arrived From home    Required Notices Provided Observation Status Notice    Referral Source admission list    Reason for Consult discharge planning    Preferred Language English               Functional Status     Row Name 09/02/22 1729       Functional Status    Usual Activity Tolerance moderate    Current Activity Tolerance moderate       Functional Status, IADL    Medications assistive person    Meal Preparation assistive person    Housekeeping assistive person    Laundry assistive person    Shopping assistive person               Psychosocial    No documentation.                Abuse/Neglect    No documentation.                Legal    No documentation.                Substance Abuse    No documentation.                Patient Forms    No documentation.                   Savita Monge

## 2022-09-02 NOTE — PLAN OF CARE
Goal Outcome Evaluation:  Plan of Care Reviewed With: patient           Outcome Evaluation: Pt is an 83 yo female admitted with hypertension. PMH includes seizure, VBI, DM2, CKD, htn. Pt lives with her son, owns a WC and walker. She reports a fall in june and a recent SNF stay. Today, she demonstrates generalized weakness, decreased activity tolerance, balance impairments and general functional mobility deficits. She was sba for bed mobility, min A for sit<>stand and to ambulate 20'. Chair follow during ambulation for safety as pt appears unsteady, legs shaky and generally weak. PT is recommending d/c to SNF at this time.

## 2022-09-02 NOTE — NURSING NOTE
Pt bladder scanned twice >500 x2, pt had just voided. Unsure of amount pt said it was a good amount.

## 2022-09-02 NOTE — PROGRESS NOTES
Name: Halie Guidry ADMIT: 2022   : 1940  PCP: Napoleon Mead MD    MRN: [id number] LOS: 0 days   AGE/SEX: 82 y.o. female  ROOM: [facility]     Subjective   Subjective   CC: elevated blood pressure  No acute events. Patient has no new complaints. Taking PO. Denies CP/dyspnea/f/c/n/v/d.    Objective   Objective   Vital Signs  Temp:  [97.5 °F (36.4 °C)-98.7 °F (37.1 °C)] 97.5 °F (36.4 °C)  Heart Rate:  [57-84] 70  Resp:  [18-20] 20  BP: (137-216)/(58-82) 152/69  SpO2:  [91 %-97 %] 94 %  on   ;   Device (Oxygen Therapy): room air  Body mass index is 27.37 kg/m².  Physical Exam  Vitals and nursing note reviewed.   Constitutional:       General: She is not in acute distress.     Appearance: She is not toxic-appearing or diaphoretic.   HENT:      Head: Normocephalic and atraumatic.      Nose: Nose normal.      Mouth/Throat:      Mouth: Mucous membranes are moist.      Pharynx: Oropharynx is clear.   Eyes:      Conjunctiva/sclera: Conjunctivae normal.      Pupils: Pupils are equal, round, and reactive to light.   Cardiovascular:      Rate and Rhythm: Normal rate and regular rhythm.      Pulses: Normal pulses.   Pulmonary:      Effort: Pulmonary effort is normal.      Breath sounds: Normal breath sounds.   Abdominal:      General: Bowel sounds are normal.      Palpations: Abdomen is soft.      Tenderness: There is no abdominal tenderness.   Musculoskeletal:         General: Swelling (1+ BLE) present. No tenderness.      Cervical back: Normal range of motion and neck supple.   Skin:     General: Skin is warm and dry.      Capillary Refill: Capillary refill takes less than 2 seconds.   Neurological:      General: No focal deficit present.      Mental Status: She is alert and oriented to person, place, and time.   Psychiatric:         Mood and Affect: Mood normal.         Behavior: Behavior normal.     Results Review     I reviewed the patient's new clinical results.  I reviewed the patient's  telemetry  Results from last 7 days   Lab Units 09/02/22  1431 09/01/22 2027   WBC 10*3/mm3 13.99* 9.46   HEMOGLOBIN g/dL 12.6 13.2   PLATELETS 10*3/mm3 273 301     Results from last 7 days   Lab Units 09/02/22  1431 09/01/22 1952   SODIUM mmol/L 136 137   POTASSIUM mmol/L 4.0 4.5   CHLORIDE mmol/L 102 102   CO2 mmol/L 22.0 21.3*   BUN mg/dL 31* 37*   CREATININE mg/dL 1.00 1.20*   GLUCOSE mg/dL 300* 236*   EGFR mL/min/1.73 56.4* 45.3*     Results from last 7 days   Lab Units 09/01/22 1952   ALBUMIN g/dL 3.80   BILIRUBIN mg/dL 0.4   ALK PHOS U/L 108   AST (SGOT) U/L 11   ALT (SGPT) U/L 9     Results from last 7 days   Lab Units 09/02/22  1431 09/01/22 1952   CALCIUM mg/dL 9.1 8.9   ALBUMIN g/dL  --  3.80       Hemoglobin A1C   Date/Time Value Ref Range Status   09/01/2022 2027 7.70 (H) 4.80 - 5.60 % Final     Glucose   Date/Time Value Ref Range Status   09/02/2022 1545 269 (H) 70 - 130 mg/dL Final     Comment:     Meter: [id number] : [id number] Griffith Vyteis CNA   09/02/2022 1109 321 (H) 70 - 130 mg/dL Final     Comment:     Meter: [id number] : [id number] Griffith Vyteis CNA   09/02/2022 0614 345 (H) 70 - 130 mg/dL Final     Comment:     Meter: [id number] : [id number] Becky Cernaa NA   09/02/2022 0217 342 (H) 70 - 130 mg/dL Final     Comment:     Meter: [id number] : [id number] Becky Cernaa NA       XR Chest 1 View    Result Date: 9/1/2022  1. No acute process.  This report was finalized on 9/1/2022 7:42 PM by Dr. Mario Alberto Moura M.D.      Scheduled Medications  atorvastatin, 80 mg, Oral, Nightly  [START ON 9/3/2022] bisoprolol, 5 mg, Oral, Daily  DULoxetine, 30 mg, Oral, Daily  enoxaparin, 40 mg, Subcutaneous, Daily  insulin glargine, 30 Units, Subcutaneous, Nightly  insulin lispro, 0-9 Units, Subcutaneous, TID AC  levothyroxine, 112 mcg, Oral, Daily  lisinopril, 10 mg, Oral, Q24H  pantoprazole, 40 mg, Oral, QAM  pregabalin, 50 mg, Oral, TID    Infusions  niCARdipine, 5-15 mg/hr, Last  Rate: 5 mg/hr (09/02/22 1404)    Diet  Diet Regular; Cardiac, Consistent Carbohydrate       Assessment/Plan     Active Hospital Problems    Diagnosis  POA   • **Accelerated hypertension [I10]  Yes   • Anxiety disorder [F41.9]  Yes   • Seizure (HCC) [R56.9]  Yes   • Type 2 diabetes mellitus, with long-term current use of insulin (HCC) [E11.9, Z79.4]  Not Applicable   • Primary hypothyroidism [E03.9]  Yes   • Hyperlipidemia [E78.5]  Yes   • Benign essential hypertension [I10]  Yes   • Stage 3a chronic kidney disease (HCC) [N18.31]  Yes      Resolved Hospital Problems   No resolved problems to display.   Hypertensive Urgency  - continue on nicardipine drip  - lisinopril dose increased and bisoprolol added  - monitor closely and try to wean drip  - renal ultrasound noted  - appreciate nephrology recs    Type 2 DM  - continue on ssi/hypoglycemia protocol  - continue lantus 30 units nightly    Hypothyroidism  - continue on levothyroxine  - check TSH    CKD Stage 3a  - baseline creatinine 1.0-1.2  - monitor BMP    Lovenox 40 mg SC daily for DVT prophylaxis.  Full code.  Discussed with patient, nursing staff and care team on multidisciplinary rounds.  Anticipate discharge home in 1-2 days.      Lewis Sheppard MD  [facility]  09/02/22  16:46 EDT

## 2022-09-03 LAB
GLUCOSE BLDC GLUCOMTR-MCNC: 170 MG/DL (ref 70–130)
GLUCOSE BLDC GLUCOMTR-MCNC: 186 MG/DL (ref 70–130)
GLUCOSE BLDC GLUCOMTR-MCNC: 220 MG/DL (ref 70–130)
GLUCOSE BLDC GLUCOMTR-MCNC: 358 MG/DL (ref 70–130)

## 2022-09-03 PROCEDURE — 96372 THER/PROPH/DIAG INJ SC/IM: CPT

## 2022-09-03 PROCEDURE — 63710000001 INSULIN LISPRO (HUMAN) PER 5 UNITS: Performed by: NURSE PRACTITIONER

## 2022-09-03 PROCEDURE — 82962 GLUCOSE BLOOD TEST: CPT

## 2022-09-03 PROCEDURE — 25010000002 ENOXAPARIN PER 10 MG: Performed by: NURSE PRACTITIONER

## 2022-09-03 PROCEDURE — 96366 THER/PROPH/DIAG IV INF ADDON: CPT

## 2022-09-03 PROCEDURE — G0378 HOSPITAL OBSERVATION PER HR: HCPCS

## 2022-09-03 RX ORDER — LISINOPRIL 10 MG/1
10 TABLET ORAL ONCE
Status: COMPLETED | OUTPATIENT
Start: 2022-09-03 | End: 2022-09-03

## 2022-09-03 RX ORDER — AMLODIPINE BESYLATE 5 MG/1
5 TABLET ORAL
Status: DISCONTINUED | OUTPATIENT
Start: 2022-09-03 | End: 2022-09-05

## 2022-09-03 RX ORDER — LISINOPRIL 20 MG/1
20 TABLET ORAL
Status: DISCONTINUED | OUTPATIENT
Start: 2022-09-04 | End: 2022-09-05

## 2022-09-03 RX ADMIN — PREGABALIN 50 MG: 50 CAPSULE ORAL at 23:34

## 2022-09-03 RX ADMIN — HYDROCODONE BITARTRATE AND ACETAMINOPHEN 1 TABLET: 5; 325 TABLET ORAL at 20:57

## 2022-09-03 RX ADMIN — LEVOTHYROXINE SODIUM 112 MCG: 0.11 TABLET ORAL at 09:09

## 2022-09-03 RX ADMIN — LISINOPRIL 10 MG: 10 TABLET ORAL at 09:09

## 2022-09-03 RX ADMIN — PANTOPRAZOLE SODIUM 40 MG: 40 TABLET, DELAYED RELEASE ORAL at 06:25

## 2022-09-03 RX ADMIN — SODIUM CHLORIDE 5 MG/HR: 9 INJECTION, SOLUTION INTRAVENOUS at 06:24

## 2022-09-03 RX ADMIN — ENOXAPARIN SODIUM 40 MG: 100 INJECTION SUBCUTANEOUS at 09:08

## 2022-09-03 RX ADMIN — BISOPROLOL FUMARATE 5 MG: 5 TABLET, FILM COATED ORAL at 09:09

## 2022-09-03 RX ADMIN — INSULIN LISPRO 9 UNITS: 100 INJECTION, SOLUTION INTRAVENOUS; SUBCUTANEOUS at 11:52

## 2022-09-03 RX ADMIN — INSULIN LISPRO 2 UNITS: 100 INJECTION, SOLUTION INTRAVENOUS; SUBCUTANEOUS at 17:47

## 2022-09-03 RX ADMIN — DULOXETINE HYDROCHLORIDE 30 MG: 30 CAPSULE, DELAYED RELEASE ORAL at 09:09

## 2022-09-03 RX ADMIN — PREGABALIN 50 MG: 50 CAPSULE ORAL at 09:09

## 2022-09-03 RX ADMIN — LISINOPRIL 10 MG: 10 TABLET ORAL at 13:36

## 2022-09-03 RX ADMIN — ATORVASTATIN CALCIUM 80 MG: 80 TABLET, FILM COATED ORAL at 20:55

## 2022-09-03 RX ADMIN — INSULIN GLARGINE-YFGN 50 UNITS: 100 INJECTION, SOLUTION SUBCUTANEOUS at 21:00

## 2022-09-03 RX ADMIN — AMLODIPINE BESYLATE 5 MG: 5 TABLET ORAL at 13:36

## 2022-09-03 RX ADMIN — PREGABALIN 50 MG: 50 CAPSULE ORAL at 17:47

## 2022-09-03 RX ADMIN — HYDROCODONE BITARTRATE AND ACETAMINOPHEN 1 TABLET: 5; 325 TABLET ORAL at 13:39

## 2022-09-03 RX ADMIN — INSULIN LISPRO 4 UNITS: 100 INJECTION, SOLUTION INTRAVENOUS; SUBCUTANEOUS at 09:09

## 2022-09-03 NOTE — PLAN OF CARE
Goal Outcome Evaluation:  Plan of Care Reviewed With: patient        Progress: improving  Outcome Evaluation: Pt states she is feeling better today, MD adjusted BP meds today and her pressure continues to improve, BG continues to run high, HS dose increased by MD, pt takes trujeo at home. hopefully home tomorrow. safety maintained, will EVERETT ayala

## 2022-09-03 NOTE — PROGRESS NOTES
[facility] Progress Note      Patient Name: Halie Guidry  : 1940  MRN: [id number]  Primary Care Physician:  Napoleon Mead MD  Date of admission: 2022    Subjective     Interval History:   For hypertension  Remains on a Cardene drip.    Review of Systems:   As noted above    Objective     Vitals:   Temp:  [97 °F (36.1 °C)-98.1 °F (36.7 °C)] 97 °F (36.1 °C)  Heart Rate:  [66-74] 73  Resp:  [20] 20  BP: (144-180)/(62-76) 154/64    Intake/Output Summary (Last 24 hours) at 9/3/2022 0908  Last data filed at 9/3/2022 0710  Gross per 24 hour   Intake 1050 ml   Output 2500 ml   Net -1450 ml       Physical Exam:    General Appearance: NAD  HEENT: oral mucosa normal, nonicteric sclera  Neck: supple, no JVD  Lungs: CTA  Heart: RRR, normal S1 and S2  Abdomen: soft, nondistended  Extremities: no edema  Neuro: Awake alert and moving all extremities    Scheduled Meds:     atorvastatin, 80 mg, Oral, Nightly  bisoprolol, 5 mg, Oral, Daily  DULoxetine, 30 mg, Oral, Daily  enoxaparin, 40 mg, Subcutaneous, Daily  insulin glargine, 30 Units, Subcutaneous, Nightly  insulin lispro, 0-9 Units, Subcutaneous, TID AC  levothyroxine, 112 mcg, Oral, Daily  lisinopril, 10 mg, Oral, Q24H  pantoprazole, 40 mg, Oral, QAM  pregabalin, 50 mg, Oral, TID      IV Meds:   niCARdipine, 5-15 mg/hr, Last Rate: 5 mg/hr (22 0624)        Results Reviewed:   I have personally reviewed the results from the time of this admission to 9/3/2022 09:08 EDT     Results from last 7 days   Lab Units 22  1431 22  195   SODIUM mmol/L 136 137   POTASSIUM mmol/L 4.0 4.5   CHLORIDE mmol/L 102 102   CO2 mmol/L 22.0 21.3*   BUN mg/dL 31* 37*   CREATININE mg/dL 1.00 1.20*   CALCIUM mg/dL 9.1 8.9   BILIRUBIN mg/dL  --  0.4   ALK PHOS U/L  --  108   ALT (SGPT) U/L  --  9   AST (SGOT) U/L  --  11   GLUCOSE mg/dL 300* 236*     Estimated Creatinine Clearance: 48.6 mL/min (by C-G formula based on SCr of 1 mg/dL).           Results from last 7 days   Lab Units 09/02/22  1431 09/01/22 2027   WBC 10*3/mm3 13.99* 9.46   HEMOGLOBIN g/dL 12.6 13.2   PLATELETS 10*3/mm3 273 301           Assessment / Plan     ASSESSMENT:  - Chronic kidney disease stage III, baseline creatinine 1.0-1.2, stable kidney function.  Urine protein to creatinine ratio of 1.9 g/g suspect secondary to diabetic nephropathy in the setting of uncontrolled hypertension- Uncontrolled hypertension  - Diabetes with hemoglobin A1c of 7.7  - Iron deficiency with acceptable hemoglobin     PLAN:  -Increase lisinopril to home dose 20 mg p.o. once daily  -Restart home dose of amlodipine 5 mg p.o. once daily  -Continue bisoprolol 5 mg p.o. twice daily  -Wean the Cardene drip for systolic blood pressure less than 160 mmHg  -Follow low-salt diet    Discussed with the patient's nurse    Zuleyka Lopez MD  09/03/22  09:08 EDT    [facility]  [phone]

## 2022-09-03 NOTE — PROGRESS NOTES
Name: Halie Guidry ADMIT: 2022   : 1940  PCP: Napoleon Mead MD    MRN: [id number] LOS: 0 days   AGE/SEX: 82 y.o. female  ROOM: [facility]     Subjective   Subjective   CC: elevated blood pressure  No acute events. Patient has no new complaints. Taking PO. Denies CP/dyspnea/f/c/n/v/d.    Objective   Objective   Vital Signs  Temp:  [97 °F (36.1 °C)-98.1 °F (36.7 °C)] 97 °F (36.1 °C)  Heart Rate:  [66-74] 67  Resp:  [20] 20  BP: (116-180)/(62-76) 116/62  SpO2:  [90 %-95 %] 90 %  on   ;   Device (Oxygen Therapy): room air  Body mass index is 27.37 kg/m².  Physical Exam  Vitals and nursing note reviewed.   Constitutional:       General: She is not in acute distress.     Appearance: She is not toxic-appearing or diaphoretic.   HENT:      Head: Normocephalic and atraumatic.      Nose: Nose normal.      Mouth/Throat:      Mouth: Mucous membranes are moist.      Pharynx: Oropharynx is clear.   Eyes:      Conjunctiva/sclera: Conjunctivae normal.      Pupils: Pupils are equal, round, and reactive to light.   Cardiovascular:      Rate and Rhythm: Normal rate and regular rhythm.      Pulses: Normal pulses.   Pulmonary:      Effort: Pulmonary effort is normal.      Breath sounds: Normal breath sounds.   Abdominal:      General: Bowel sounds are normal.      Palpations: Abdomen is soft.      Tenderness: There is no abdominal tenderness.   Musculoskeletal:         General: Swelling (1+ BLE) and tenderness (BLE (chronic per patient)) present.      Cervical back: Normal range of motion and neck supple.   Skin:     General: Skin is warm and dry.      Capillary Refill: Capillary refill takes less than 2 seconds.   Neurological:      General: No focal deficit present.      Mental Status: She is alert and oriented to person, place, and time.   Psychiatric:         Mood and Affect: Mood normal.         Behavior: Behavior normal.     Results Review     I reviewed the patient's new clinical results.  I reviewed the  patient's telemetry  Results from last 7 days   Lab Units 09/02/22  1431 09/01/22 2027   WBC 10*3/mm3 13.99* 9.46   HEMOGLOBIN g/dL 12.6 13.2   PLATELETS 10*3/mm3 273 301     Results from last 7 days   Lab Units 09/02/22  1431 09/01/22 1952   SODIUM mmol/L 136 137   POTASSIUM mmol/L 4.0 4.5   CHLORIDE mmol/L 102 102   CO2 mmol/L 22.0 21.3*   BUN mg/dL 31* 37*   CREATININE mg/dL 1.00 1.20*   GLUCOSE mg/dL 300* 236*   EGFR mL/min/1.73 56.4* 45.3*     Results from last 7 days   Lab Units 09/01/22 1952   ALBUMIN g/dL 3.80   BILIRUBIN mg/dL 0.4   ALK PHOS U/L 108   AST (SGOT) U/L 11   ALT (SGPT) U/L 9     Results from last 7 days   Lab Units 09/02/22  1431 09/01/22 1952   CALCIUM mg/dL 9.1 8.9   ALBUMIN g/dL  --  3.80       Hemoglobin A1C   Date/Time Value Ref Range Status   09/01/2022 2027 7.70 (H) 4.80 - 5.60 % Final     Glucose   Date/Time Value Ref Range Status   09/03/2022 1139 358 (H) 70 - 130 mg/dL Final     Comment:     Meter: [id number] : [id number] Griffith Vyteis CNA   09/03/2022 0617 220 (H) 70 - 130 mg/dL Final     Comment:     Meter: [id number] : [id number] Leti Jet NA   09/02/2022 2035 230 (H) 70 - 130 mg/dL Final     Comment:     Meter: [id number] : [id number] [place] Jet NA   09/02/2022 1545 269 (H) 70 - 130 mg/dL Final     Comment:     Meter: [id number] : [id number] Griffith Vyteis CNA   09/02/2022 1109 321 (H) 70 - 130 mg/dL Final     Comment:     Meter: [id number] : [id number] Griffith Vyteis CNA   09/02/2022 0614 345 (H) 70 - 130 mg/dL Final     Comment:     Meter: [id number] : [id number] Nachomicheal Gwen HERNANDEZ   09/02/2022 0217 342 (H) 70 - 130 mg/dL Final     Comment:     Meter: [id number] : [id number] Josetamaramicheal Gwen HERNANDEZ       XR Chest 1 View    Result Date: 9/1/2022  1. No acute process.  This report was finalized on 9/1/2022 7:42 PM by Dr. Mario Alberto Moura M.D.      Scheduled Medications  amLODIPine, 5 mg, Oral, Q24H  atorvastatin, 80 mg, Oral,  Nightly  bisoprolol, 5 mg, Oral, Daily  DULoxetine, 30 mg, Oral, Daily  enoxaparin, 40 mg, Subcutaneous, Daily  insulin glargine, 30 Units, Subcutaneous, Nightly  insulin lispro, 0-9 Units, Subcutaneous, TID AC  levothyroxine, 112 mcg, Oral, Daily  [START ON 9/4/2022] lisinopril, 20 mg, Oral, Q24H  pantoprazole, 40 mg, Oral, QAM  pregabalin, 50 mg, Oral, TID    Infusions  niCARdipine, 5-15 mg/hr, Last Rate: Stopped (09/03/22 1144)    Diet  Diet Regular; Cardiac, Consistent Carbohydrate       Assessment/Plan     Active Hospital Problems    Diagnosis  POA   • **Accelerated hypertension [I10]  Yes   • Anxiety disorder [F41.9]  Yes   • Seizure (HCC) [R56.9]  Yes   • Type 2 diabetes mellitus, with long-term current use of insulin (HCC) [E11.9, Z79.4]  Not Applicable   • Primary hypothyroidism [E03.9]  Yes   • Hyperlipidemia [E78.5]  Yes   • Benign essential hypertension [I10]  Yes   • Stage 3a chronic kidney disease (HCC) [N18.31]  Yes      Resolved Hospital Problems   No resolved problems to display.   Hypertensive Urgency  - lisinopril dose increased  - bisoprolol and amlodipine added  - she has been weaned off of the cardene drip  - appreciate nephrology recs    Type 2 DM  - continue on ssi/hypoglycemia protocol  - increase lantus to 50 units nightly    Hypothyroidism  - TSH is borderline elevated  - continue on levothyroxine at current dose and follow up TSH in 4-6 weeks with her PCP    CKD Stage 3a  - baseline creatinine 1.0-1.2  - monitor BMP    Lovenox 40 mg SC daily for DVT prophylaxis.  Full code.  Discussed with patient and nursing staff.  Anticipate discharge home tomorrow if BP remains stable      Lewis Sheppard MD  [facility]  09/03/22  14:52 EDT

## 2022-09-04 LAB
ANION GAP SERPL CALCULATED.3IONS-SCNC: 11 MMOL/L (ref 5–15)
BASOPHILS # BLD AUTO: 0.08 10*3/MM3 (ref 0–0.2)
BASOPHILS NFR BLD AUTO: 1 % (ref 0–1.5)
BUN SERPL-MCNC: 27 MG/DL (ref 8–23)
BUN/CREAT SERPL: 27.8 (ref 7–25)
CALCIUM SPEC-SCNC: 8.9 MG/DL (ref 8.6–10.5)
CHLORIDE SERPL-SCNC: 105 MMOL/L (ref 98–107)
CO2 SERPL-SCNC: 22 MMOL/L (ref 22–29)
CREAT SERPL-MCNC: 0.97 MG/DL (ref 0.57–1)
DEPRECATED RDW RBC AUTO: 40.6 FL (ref 37–54)
EGFRCR SERPLBLD CKD-EPI 2021: 58.5 ML/MIN/1.73
EOSINOPHIL # BLD AUTO: 0.65 10*3/MM3 (ref 0–0.4)
EOSINOPHIL NFR BLD AUTO: 7.9 % (ref 0.3–6.2)
ERYTHROCYTE [DISTWIDTH] IN BLOOD BY AUTOMATED COUNT: 13.6 % (ref 12.3–15.4)
GLUCOSE BLDC GLUCOMTR-MCNC: 149 MG/DL (ref 70–130)
GLUCOSE BLDC GLUCOMTR-MCNC: 189 MG/DL (ref 70–130)
GLUCOSE BLDC GLUCOMTR-MCNC: 241 MG/DL (ref 70–130)
GLUCOSE BLDC GLUCOMTR-MCNC: 246 MG/DL (ref 70–130)
GLUCOSE SERPL-MCNC: 205 MG/DL (ref 65–99)
HCT VFR BLD AUTO: 36.4 % (ref 34–46.6)
HGB BLD-MCNC: 12.3 G/DL (ref 12–15.9)
IMM GRANULOCYTES # BLD AUTO: 0.03 10*3/MM3 (ref 0–0.05)
IMM GRANULOCYTES NFR BLD AUTO: 0.4 % (ref 0–0.5)
LYMPHOCYTES # BLD AUTO: 1.98 10*3/MM3 (ref 0.7–3.1)
LYMPHOCYTES NFR BLD AUTO: 23.9 % (ref 19.6–45.3)
MCH RBC QN AUTO: 27.7 PG (ref 26.6–33)
MCHC RBC AUTO-ENTMCNC: 33.8 G/DL (ref 31.5–35.7)
MCV RBC AUTO: 82 FL (ref 79–97)
MONOCYTES # BLD AUTO: 0.73 10*3/MM3 (ref 0.1–0.9)
MONOCYTES NFR BLD AUTO: 8.8 % (ref 5–12)
NEUTROPHILS NFR BLD AUTO: 4.81 10*3/MM3 (ref 1.7–7)
NEUTROPHILS NFR BLD AUTO: 58 % (ref 42.7–76)
NRBC BLD AUTO-RTO: 0 /100 WBC (ref 0–0.2)
PLATELET # BLD AUTO: 249 10*3/MM3 (ref 140–450)
PMV BLD AUTO: 9.3 FL (ref 6–12)
POTASSIUM SERPL-SCNC: 4 MMOL/L (ref 3.5–5.2)
RBC # BLD AUTO: 4.44 10*6/MM3 (ref 3.77–5.28)
SODIUM SERPL-SCNC: 138 MMOL/L (ref 136–145)
WBC NRBC COR # BLD: 8.28 10*3/MM3 (ref 3.4–10.8)

## 2022-09-04 PROCEDURE — 96372 THER/PROPH/DIAG INJ SC/IM: CPT

## 2022-09-04 PROCEDURE — 80048 BASIC METABOLIC PNL TOTAL CA: CPT | Performed by: INTERNAL MEDICINE

## 2022-09-04 PROCEDURE — 97116 GAIT TRAINING THERAPY: CPT

## 2022-09-04 PROCEDURE — 82962 GLUCOSE BLOOD TEST: CPT

## 2022-09-04 PROCEDURE — G0378 HOSPITAL OBSERVATION PER HR: HCPCS

## 2022-09-04 PROCEDURE — 63710000001 INSULIN LISPRO (HUMAN) PER 5 UNITS: Performed by: NURSE PRACTITIONER

## 2022-09-04 PROCEDURE — 85025 COMPLETE CBC W/AUTO DIFF WBC: CPT | Performed by: INTERNAL MEDICINE

## 2022-09-04 PROCEDURE — 25010000002 ENOXAPARIN PER 10 MG: Performed by: NURSE PRACTITIONER

## 2022-09-04 RX ORDER — CHLORTHALIDONE 25 MG/1
12.5 TABLET ORAL DAILY
Status: DISCONTINUED | OUTPATIENT
Start: 2022-09-04 | End: 2022-09-06

## 2022-09-04 RX ADMIN — HYDROCODONE BITARTRATE AND ACETAMINOPHEN 1 TABLET: 5; 325 TABLET ORAL at 09:21

## 2022-09-04 RX ADMIN — PANTOPRAZOLE SODIUM 40 MG: 40 TABLET, DELAYED RELEASE ORAL at 06:30

## 2022-09-04 RX ADMIN — BISOPROLOL FUMARATE 5 MG: 5 TABLET, FILM COATED ORAL at 09:14

## 2022-09-04 RX ADMIN — INSULIN LISPRO 2 UNITS: 100 INJECTION, SOLUTION INTRAVENOUS; SUBCUTANEOUS at 09:15

## 2022-09-04 RX ADMIN — LISINOPRIL 20 MG: 20 TABLET ORAL at 09:14

## 2022-09-04 RX ADMIN — LEVOTHYROXINE SODIUM 112 MCG: 0.11 TABLET ORAL at 09:14

## 2022-09-04 RX ADMIN — ATORVASTATIN CALCIUM 80 MG: 80 TABLET, FILM COATED ORAL at 21:22

## 2022-09-04 RX ADMIN — INSULIN GLARGINE-YFGN 50 UNITS: 100 INJECTION, SOLUTION SUBCUTANEOUS at 21:22

## 2022-09-04 RX ADMIN — PREGABALIN 50 MG: 50 CAPSULE ORAL at 15:40

## 2022-09-04 RX ADMIN — INSULIN LISPRO 4 UNITS: 100 INJECTION, SOLUTION INTRAVENOUS; SUBCUTANEOUS at 13:05

## 2022-09-04 RX ADMIN — ENOXAPARIN SODIUM 40 MG: 100 INJECTION SUBCUTANEOUS at 09:15

## 2022-09-04 RX ADMIN — PREGABALIN 50 MG: 50 CAPSULE ORAL at 09:14

## 2022-09-04 RX ADMIN — HYDROCODONE BITARTRATE AND ACETAMINOPHEN 1 TABLET: 5; 325 TABLET ORAL at 15:40

## 2022-09-04 RX ADMIN — DULOXETINE HYDROCHLORIDE 30 MG: 30 CAPSULE, DELAYED RELEASE ORAL at 09:14

## 2022-09-04 RX ADMIN — AMLODIPINE BESYLATE 5 MG: 5 TABLET ORAL at 09:14

## 2022-09-04 RX ADMIN — HYDROCODONE BITARTRATE AND ACETAMINOPHEN 1 TABLET: 5; 325 TABLET ORAL at 21:22

## 2022-09-04 RX ADMIN — PREGABALIN 50 MG: 50 CAPSULE ORAL at 21:22

## 2022-09-04 RX ADMIN — CHLORTHALIDONE 12.5 MG: 25 TABLET ORAL at 13:05

## 2022-09-04 NOTE — THERAPY TREATMENT NOTE
Patient Name: Halie Guidry  : 1940    MRN: [id number]                              Today's Date: 2022       Admit Date: 2022    Visit Dx:     ICD-10-CM ICD-9-CM   1. Accelerated hypertension  I10 401.0   2. Dehydration  E86.0 276.51   3. Hyperglycemia  R73.9 790.29   4. Tinnitus of both ears  H93.13 388.30     Patient Active Problem List   Diagnosis   • Compression fracture   • Lumbar degenerative disc disease   • Type 2 diabetes mellitus, with long-term current use of insulin ([facility])   • Hyperlipidemia   • Benign essential hypertension   • Primary hypothyroidism   • Leukocytosis   • Neuropathy   • Osteoporosis   • Proteinuria   • Tobacco abuse   • Diabetic eye exam ([facility])   • Generalized weakness   • Spinal stenosis   • Scoliosis   • Arthritis   • VBI (vertebrobasilar insufficiency)   • Orthostatic hypotension   • 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 ([facility])   • Severe hypothyroidism   • Noncompliance with medication regimen   • Vitamin D deficiency disease   • Altered mental status   • Tremor   • Seizure ([facility])   • Stage 3a chronic kidney disease ([facility])   • Thoracic degenerative disc disease   • Metabolic encephalopathy   • VIPUL (acute kidney injury) ([facility])   • Hyperglycemia   • Type II diabetes mellitus, uncontrolled   • Lower abdominal pain   • Transaminitis   • COVID-19 virus detected   • UTI (urinary tract infection)   • Emphysematous cystitis   • Choledocholithiasis   • Hypokalemia   • Hypoxia   • Generalized abdominal pain   • History of Clostridium difficile infection   • History of ERCP   • Acute UTI (urinary tract infection)   • Hypomagnesemia   • Burning pain   • Anxiety disorder   • Neuropathic pain   • Intertrigo   • Weakness of both lower extremities   • Acute metabolic encephalopathy   • Accelerated hypertension     Past Medical History:   Diagnosis Date   • Anxiety    • Arthritis    • Benign essential hypertension 2014   • 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[facility])    •  Depression    • Diabetes (HCC)    • Diabetes mellitus, type 2 (HCC)    • Disc degeneration, lumbar    • Headache, tension-type    • Hyperlipidemia    • Hypothyroidism    • Neuropathy    • Osteoporosis 09/09/2015   • Peripheral neuropathy    • Rotator cuff tear, left    • Scoliosis    • Shoulder pain     LEFT, TORN ROTATOR CUFF S/P FALL   • Spinal stenosis      Past Surgical History:   Procedure Laterality Date   • APPENDECTOMY     • BILATERAL BREAST REDUCTION Bilateral 08/2015   • CATARACT EXTRACTION  03/2015   • CHOLECYSTECTOMY WITH INTRAOPERATIVE CHOLANGIOGRAM N/A 3/27/2022    Procedure: CHOLECYSTECTOMY LAPAROSCOPIC INTRAOPERATIVE CHOLANGIOGRAM;  Surgeon: Aiyana Hill MD;  Location: [facility] MAIN OR;  Service: General;  Laterality: N/A;   • COLONOSCOPY  06/05/2015    WNL   • ERCP N/A 2/25/2022    Procedure: ENDOSCOPIC RETROGRADE CHOLANGIOPANCREATOGRAPHY with sphincterotomy and balloon sweep;  Surgeon: Chilo Wilhelm MD;  Location: [facility] ENDOSCOPY;  Service: Gastroenterology;  Laterality: N/A;  PRE/POST - CBD stones   • ERCP N/A 3/28/2022    Procedure: ENDOSCOPIC RETROGRADE CHOLANGIOPANCREATOGRAPHY WITH SPHINCTEROTOMY AND BALLOON SWEEP;  Surgeon: Chilo Wilhelm MD;  Location: [facility] ENDOSCOPY;  Service: Gastroenterology;  Laterality: N/A;  PRE: COMMON DUCT STONE  POST: COMMON DUCT STONE   • KYPHOPLASTY     • REDUCTION MAMMAPLASTY     • TONSILLECTOMY        General Information     Row Name 09/04/22 1732          Physical Therapy Time and Intention    Document Type therapy note (daily note)  -DC     Mode of Treatment physical therapy  -DC     Row Name 09/04/22 1732          General Information    Patient Profile Reviewed yes  -DC     Existing Precautions/Restrictions fall  -DC     Row Name 09/04/22 1732          Cognition    Orientation Status (Cognition) oriented x 3  -DC     Row Name 09/04/22 1732          Safety Issues, Functional Mobility    Safety Issues Affecting Function (Mobility) ability to  follow commands;awareness of need for assistance;insight into deficits/self-awareness;positioning of assistive device;problem-solving;safety precaution awareness;safety precautions follow-through/compliance;sequencing abilities;judgment  -DC           User Key  (r) = Recorded By, (t) = Taken By, (c) = Cosigned By    Initials Name Provider Type    Ziggy Mata, MELODIE Physical Therapist               Mobility     Row Name 09/04/22 1733          Bed-Chair Transfer    Bed-Chair Gilchrist (Transfers) --  -DC     Assistive Device (Bed-Chair Transfers) --  -DC     Row Name 09/04/22 1733          Sit-Stand Transfer    Sit-Stand [place]christ (Transfers) minimum assist (75% patient effort)  -DC     Assistive Device (Sit-Stand Transfers) walker, front-wheeled  -DC     Row Name 09/04/22 1733          Gait/Stairs (Locomotion)    Gilchrist Level (Gait) contact guard;minimum assist (75% patient effort)  -DC     Assistive Device (Gait) walker, front-wheeled  -DC     Distance in Feet (Gait) 60  -DC     Deviations/Abnormal Patterns (Gait) gait speed decreased;stride length decreased  -DC     Bilateral Gait Deviations forward flexed posture;heel strike decreased  -DC           User Key  (r) = Recorded By, (t) = Taken By, (c) = Cosigned By    Initials Name Provider Type    Ziggy Mata PT Physical Therapist               Obj/Interventions     Row Name 09/04/22 1736          Balance    Static Standing Balance contact guard;minimal assist  -DC     Position/Device Used, Standing Balance supported;walker, front-wheeled  -DC           User Key  (r) = Recorded By, (t) = Taken By, (c) = Cosigned By    Initials Name Provider Type    Ziggy Mata, MELODIE Physical Therapist               Goals/Plan    No documentation.                Clinical Impression     Row Name 09/04/22 1736          Pain    Pretreatment Pain Rating 0/10 - no pain  -DC     Posttreatment Pain Rating 0/10 - no pain  -DC     Row Name 09/04/22 1736           Plan of Care Review    Plan of Care Reviewed With patient  -DC     Progress no change  -DC     Outcome Evaluation Pt tolerated ability to ambulate 60 ft with Rwx at CGA/Vaughn. Pt required multiple heavy VCs for upright posture and to stand within the width of the walker. Pt continues to present with BLE weakness as noted with occasional shakiness, especially towards the end of ambulation. Pt would continue to benefit from skilled PT to improve her endurance and BLE strength. PT recommends SNF upon d/c.  -DC     Row Name 09/04/22 1731          Therapy Assessment/Plan (PT)    Criteria for Skilled Interventions Met (PT) yes  -DC     Row Name 09/04/22 1739          Positioning and Restraints    Pre-Treatment Position sitting in chair/recliner  -DC     Post Treatment Position chair  -DC     In Chair reclined;call light within reach;encouraged to call for assist;exit alarm on  -DC           User Key  (r) = Recorded By, (t) = Taken By, (c) = Cosigned By    Initials Name Provider Type    DC Ziggy Avalos PT Physical Therapist               Outcome Measures     Row Name 09/04/22 4043          How much help from another person do you currently need...    Turning from your back to your side while in flat bed without using bedrails? 3  -DC     Moving from lying on back to sitting on the side of a flat bed without bedrails? 3  -DC     Moving to and from a bed to a chair (including a wheelchair)? 3  -DC     Standing up from a chair using your arms (e.g., wheelchair, bedside chair)? 3  -DC     Climbing 3-5 steps with a railing? 2  -DC     To walk in hospital room? 3  -DC     AM-PAC 6 Clicks Score (PT) 17  -DC     Highest level of mobility 5 --> Static standing  -DC           User Key  (r) = Recorded By, (t) = Taken By, (c) = Cosigned By    Initials Name Provider Type    DC Ziggy Avalos PT Physical Therapist                             Physical Therapy Education                 Title: PT OT SLP Therapies (In Progress)      Topic: Physical Therapy (In Progress)     Point: Mobility training (Done)     Learning Progress Summary           Patient Acceptance, E,TB, VU,NR by [place] at 9/4/2022 1740    Comment: Ambulated 60 ft CGA/Vaughn with Rwx.    Acceptance, E, VU,NR by  at 9/2/2022 1428                   Point: Home exercise program (Not Started)     Learner Progress:  Not documented in this visit.          Point: Body mechanics (Done)     Learning Progress Summary           Patient Acceptance, E, VU,NR by  at 9/2/2022 1428                   Point: Precautions (Done)     Learning Progress Summary           Patient Acceptance, E, VU,NR by  at 9/2/2022 1428                               User Key     Initials Effective Dates Name Provider Type Discipline     06/16/21 -  Hetal Velasco, PT Physical Therapist PT    [place] 05/27/22 -  Ziggy Avalos, PT Physical Therapist PT              PT Recommendation and Plan     Plan of Care Reviewed With: patient  Progress: no change  Outcome Evaluation: Pt tolerated ability to ambulate 60 ft with Rwx at CGA/Vaughn. Pt required multiple heavy VCs for upright posture and to stand within the width of the walker. Pt continues to present with BLE weakness as noted with occasional shakiness, especially towards the end of ambulation. Pt would continue to benefit from skilled PT to improve her endurance and BLE strength. PT recommends SNF upon d/c.     Time Calculation:    PT Charges     Row Name 09/04/22 1741             Time Calculation    Start Time 1559  -DC      Stop Time 1613  -DC      Time Calculation (min) 14 min  -DC      PT Received On 09/04/22  -DC      PT - Next Appointment 09/06/22  -DC              Time Calculation- PT    Total Timed Code Minutes- PT 14 minute(s)  -DC              Timed Charges    45953 - Gait Training Minutes  10  -DC      94118 - PT Therapeutic Activity Minutes 4  -DC              Total Minutes    Timed Charges Total Minutes 14  -DC       Total Minutes 14  -DC            User  Key  (r) = Recorded By, (t) = Taken By, (c) = Cosigned By    Initials Name Provider Type    DC Ziggy Avalos, PT Physical Therapist              Therapy Charges for Today     Code Description Service Date Service Provider Modifiers Qty    90194627933 HC GAIT TRAINING EA 15 MIN 9/4/2022 Ziggy Avalos, PT GP 1          PT G-Codes  Outcome Measure Options: AM-PAC 6 Clicks Daily Activity (OT)  AM-PAC 6 Clicks Score (PT): 17  AM-PAC 6 Clicks Score (OT): 16    Ziggy Avalos PT  9/4/2022

## 2022-09-04 NOTE — PROGRESS NOTES
Name: Halie Guidry ADMIT: 2022   : 1940  PCP: Napoleon Mead MD    MRN: [id number] LOS: 0 days   AGE/SEX: 82 y.o. female  ROOM: [facility]     Subjective   Subjective   CC: elevated blood pressure  No acute events. Patient has no new complaints. Taking PO. Denies CP/dyspnea/f/c/n/v/d.    Objective   Objective   Vital Signs  Temp:  [97.4 °F (36.3 °C)-98 °F (36.7 °C)] 97.4 °F (36.3 °C)  Heart Rate:  [59-67] 66  Resp:  [20] 20  BP: (153-175)/(68-83) 154/75  SpO2:  [91 %-95 %] 91 %  on   ;   Device (Oxygen Therapy): room air  Body mass index is 27.37 kg/m².  Physical Exam  Vitals and nursing note reviewed.   Constitutional:       General: She is not in acute distress.     Appearance: She is not toxic-appearing or diaphoretic.   HENT:      Head: Normocephalic and atraumatic.      Nose: Nose normal.      Mouth/Throat:      Mouth: Mucous membranes are moist.      Pharynx: Oropharynx is clear.   Eyes:      Conjunctiva/sclera: Conjunctivae normal.      Pupils: Pupils are equal, round, and reactive to light.   Cardiovascular:      Rate and Rhythm: Normal rate and regular rhythm.      Pulses: Normal pulses.   Pulmonary:      Effort: Pulmonary effort is normal.      Breath sounds: Normal breath sounds.   Abdominal:      General: Bowel sounds are normal.      Palpations: Abdomen is soft.      Tenderness: There is no abdominal tenderness.   Musculoskeletal:         General: Swelling (1+ BLE) and tenderness (BLE (chronic per patient)) present.      Cervical back: Normal range of motion and neck supple.   Skin:     General: Skin is warm and dry.      Capillary Refill: Capillary refill takes less than 2 seconds.   Neurological:      General: No focal deficit present.      Mental Status: She is alert and oriented to person, place, and time.   Psychiatric:         Mood and Affect: Mood normal.         Behavior: Behavior normal.     Results Review     I reviewed the patient's new clinical results.  I reviewed the  patient's telemetry  Results from last 7 days   Lab Units 09/04/22  0504 09/02/22  1431 09/01/22 2027   WBC 10*3/mm3 8.28 13.99* 9.46   HEMOGLOBIN g/dL 12.3 12.6 13.2   PLATELETS 10*3/mm3 249 273 301     Results from last 7 days   Lab Units 09/04/22  0504 09/02/22  1431 09/01/22 1952   SODIUM mmol/L 138 136 137   POTASSIUM mmol/L 4.0 4.0 4.5   CHLORIDE mmol/L 105 102 102   CO2 mmol/L 22.0 22.0 21.3*   BUN mg/dL 27* 31* 37*   CREATININE mg/dL 0.97 1.00 1.20*   GLUCOSE mg/dL 205* 300* 236*   EGFR mL/min/1.73 58.5* 56.4* 45.3*     Results from last 7 days   Lab Units 09/01/22 1952   ALBUMIN g/dL 3.80   BILIRUBIN mg/dL 0.4   ALK PHOS U/L 108   AST (SGOT) U/L 11   ALT (SGPT) U/L 9     Results from last 7 days   Lab Units 09/04/22  0504 09/02/22  1431 09/01/22 1952   CALCIUM mg/dL 8.9 9.1 8.9   ALBUMIN g/dL  --   --  3.80       Hemoglobin A1C   Date/Time Value Ref Range Status   09/01/2022 2027 7.70 (H) 4.80 - 5.60 % Final     Glucose   Date/Time Value Ref Range Status   09/04/2022 1105 241 (H) 70 - 130 mg/dL Final     Comment:     Meter: [id number] : [id number] Richard Adeel NA   09/04/2022 0609 189 (H) 70 - 130 mg/dL Final     Comment:     Meter: [id number] : [id number] Leti Chaudhary NA   09/03/2022 2025 170 (H) 70 - 130 mg/dL Final     Comment:     Meter: [id number] : [id number] Leti Chaudhary NA   09/03/2022 1618 186 (H) 70 - 130 mg/dL Final     Comment:     Meter: [id number] : [id number] Gladis ARROYOA   09/03/2022 1139 358 (H) 70 - 130 mg/dL Final     Comment:     Meter: [id number] : [id number] Gladis Landaverde CNA   09/03/2022 0617 220 (H) 70 - 130 mg/dL Final     Comment:     Meter: [id number] : [id number] Leti HERNANDEZ   09/02/2022 2035 230 (H) 70 - 130 mg/dL Final     Comment:     Meter: [id number] : [id number] Leti HERNANDEZ       No radiology results for the last day  Scheduled Medications  amLODIPine, 5 mg, Oral, Q24H  atorvastatin, 80 mg, Oral, Nightly  bisoprolol, 5  mg, Oral, Daily  chlorthalidone, 12.5 mg, Oral, Daily  DULoxetine, 30 mg, Oral, Daily  enoxaparin, 40 mg, Subcutaneous, Daily  insulin glargine, 50 Units, Subcutaneous, Nightly  insulin lispro, 0-9 Units, Subcutaneous, TID AC  levothyroxine, 112 mcg, Oral, Daily  lisinopril, 20 mg, Oral, Q24H  pantoprazole, 40 mg, Oral, QAM  pregabalin, 50 mg, Oral, TID    Infusions  niCARdipine, 5-15 mg/hr, Last Rate: Stopped (09/03/22 1144)    Diet  Diet Regular; Cardiac, Consistent Carbohydrate       Assessment/Plan     Active Hospital Problems    Diagnosis  POA   • **Accelerated hypertension [I10]  Yes   • Anxiety disorder [F41.9]  Yes   • Seizure (HCC) [R56.9]  Yes   • Type 2 diabetes mellitus, with long-term current use of insulin (HCC) [E11.9, Z79.4]  Not Applicable   • Primary hypothyroidism [E03.9]  Yes   • Hyperlipidemia [E78.5]  Yes   • Benign essential hypertension [I10]  Yes   • Stage 3a chronic kidney disease (HCC) [N18.31]  Yes      Resolved Hospital Problems   No resolved problems to display.   Hypertensive Urgency  - she has been weaned off of the cardene drip  - continue lisinopril, bisoprolol, and amlodipine  - chlorthalidone added  - appreciate nephrology recs    Type 2 DM  - continue on ssi/hypoglycemia protocol  - continue lantus 50 units nightly    Hypothyroidism  - TSH is borderline elevated  - continue on levothyroxine at current dose and follow up TSH in 4-6 weeks with her PCP    CKD Stage 3a  - baseline creatinine 1.0-1.2  - monitor BMP    Lovenox 40 mg SC daily for DVT prophylaxis.  Full code.  Discussed with patient and nursing staff.  Anticipate discharge home tomorrow if BP improves      Lewis Sheppard MD  [facility]  09/04/22  14:04 EDT

## 2022-09-04 NOTE — PLAN OF CARE
Goal Outcome Evaluation:  Plan of Care Reviewed With: patient        Progress: no change  Outcome Evaluation: Pt tolerated ability to ambulate 60 ft with Rwx at CGA/Vaughn. Pt required multiple heavy VCs for upright posture and to stand within the width of the walker. Pt continues to present with BLE weakness as noted with occasional shakiness, especially towards the end of ambulation. Pt would continue to benefit from skilled PT to improve her endurance and BLE strength. PT recommends SNF upon d/c.

## 2022-09-04 NOTE — PLAN OF CARE
Goal Outcome Evaluation:              Outcome Evaluation: VSS patient feling better. BP better maintain safety and continue to monitor.

## 2022-09-04 NOTE — PROGRESS NOTES
[facility] Progress Note      Patient Name: Halie Guidry  : 1940  MRN: [id number]  Primary Care Physician:  Napoleon Mead MD  Date of admission: 2022    Subjective     Interval History:   For hypertension  Blood pressure remains above goal.  Off Cardene drip    Review of Systems:   As noted above    Objective     Vitals:   Temp:  [97 °F (36.1 °C)-98 °F (36.7 °C)] 98 °F (36.7 °C)  Heart Rate:  [64-67] 64  Resp:  [20] 20  BP: (116-175)/(62-82) 175/73    Intake/Output Summary (Last 24 hours) at 2022 0839  Last data filed at 2022 0610  Gross per 24 hour   Intake 1846.67 ml   Output 1800 ml   Net 46.67 ml       Physical Exam:    General Appearance: NAD  HEENT: oral mucosa normal, nonicteric sclera  Neck: supple, no JVD  Lungs: CTA  Heart: RRR, normal S1 and S2  Abdomen: soft, nondistended  Extremities: no edema  Neuro: Awake alert and moving all extremities    Scheduled Meds:     amLODIPine, 5 mg, Oral, Q24H  atorvastatin, 80 mg, Oral, Nightly  bisoprolol, 5 mg, Oral, Daily  DULoxetine, 30 mg, Oral, Daily  enoxaparin, 40 mg, Subcutaneous, Daily  insulin glargine, 50 Units, Subcutaneous, Nightly  insulin lispro, 0-9 Units, Subcutaneous, TID AC  levothyroxine, 112 mcg, Oral, Daily  lisinopril, 20 mg, Oral, Q24H  pantoprazole, 40 mg, Oral, QAM  pregabalin, 50 mg, Oral, TID      IV Meds:   niCARdipine, 5-15 mg/hr, Last Rate: Stopped (22 1144)        Results Reviewed:   I have personally reviewed the results from the time of this admission to 2022 08:39 EDT     Results from last 7 days   Lab Units 22  0504 22  1431 222   SODIUM mmol/L 138 136 137   POTASSIUM mmol/L 4.0 4.0 4.5   CHLORIDE mmol/L 105 102 102   CO2 mmol/L 22.0 22.0 21.3*   BUN mg/dL 27* 31* 37*   CREATININE mg/dL 0.97 1.00 1.20*   CALCIUM mg/dL 8.9 9.1 8.9   BILIRUBIN mg/dL  --   --  0.4   ALK PHOS U/L  --   --  108   ALT (SGPT) U/L  --   --  9   AST (SGOT) U/L  --   --   11   GLUCOSE mg/dL 205* 300* 236*     Estimated Creatinine Clearance: 50.1 mL/min (by C-G formula based on SCr of 0.97 mg/dL).          Results from last 7 days   Lab Units 09/04/22  0504 09/02/22  1431 09/01/22 2027   WBC 10*3/mm3 8.28 13.99* 9.46   HEMOGLOBIN g/dL 12.3 12.6 13.2   PLATELETS 10*3/mm3 249 273 301           Assessment / Plan     ASSESSMENT:  - Chronic kidney disease stage III, baseline creatinine 1.0-1.2, stable kidney function.  Urine protein to creatinine ratio of 1.9 g/g suspect secondary to diabetic nephropathy in the setting of uncontrolled hypertension- Uncontrolled hypertension  - Diabetes with hemoglobin A1c of 7.7  - Iron deficiency with acceptable hemoglobin     PLAN:  -Start chlorthalidone 12.5 mg p.o. once daily  -Continue amlodipine 5 mg p.o. once daily, lisinopril 20 mg p.o. once daily, bisoprolol 5 mg p.o. twice daily  -Will likely need to increase the dose of amlodipine and lisinopril tomorrow  -Follow low-salt diet      Zuleyka Lopez MD  09/04/22  08:39 EDT    [facility]  [phone]

## 2022-09-04 NOTE — PLAN OF CARE
Goal Outcome Evaluation:  Plan of Care Reviewed With: patient        Progress: improving  Outcome Evaluation: BP ranging from 170's diastolic to 150's. Meds adjusted and added, see MAR. Eager to be discharged, hoepfully for tomorrow. Up in the chair most of shift. Complaints of chronic pain, meds given. Blood glucose monitored.

## 2022-09-05 LAB
BACTERIA UR QL AUTO: ABNORMAL /HPF
BILIRUB UR QL STRIP: NEGATIVE
CLARITY UR: CLEAR
COLOR UR: YELLOW
GLUCOSE BLDC GLUCOMTR-MCNC: 186 MG/DL (ref 70–130)
GLUCOSE BLDC GLUCOMTR-MCNC: 215 MG/DL (ref 70–130)
GLUCOSE BLDC GLUCOMTR-MCNC: 246 MG/DL (ref 70–130)
GLUCOSE BLDC GLUCOMTR-MCNC: 285 MG/DL (ref 70–130)
GLUCOSE UR STRIP-MCNC: NEGATIVE MG/DL
HGB UR QL STRIP.AUTO: NEGATIVE
HYALINE CASTS UR QL AUTO: ABNORMAL /LPF
KETONES UR QL STRIP: NEGATIVE
LEUKOCYTE ESTERASE UR QL STRIP.AUTO: ABNORMAL
NITRITE UR QL STRIP: NEGATIVE
PH UR STRIP.AUTO: 5.5 [PH] (ref 5–8)
PROT UR QL STRIP: ABNORMAL
RBC # UR STRIP: ABNORMAL /HPF
REF LAB TEST METHOD: ABNORMAL
SP GR UR STRIP: 1.01 (ref 1–1.03)
SQUAMOUS #/AREA URNS HPF: ABNORMAL /HPF
UROBILINOGEN UR QL STRIP: ABNORMAL
WBC # UR STRIP: ABNORMAL /HPF

## 2022-09-05 PROCEDURE — 63710000001 INSULIN LISPRO (HUMAN) PER 5 UNITS: Performed by: NURSE PRACTITIONER

## 2022-09-05 PROCEDURE — G0378 HOSPITAL OBSERVATION PER HR: HCPCS

## 2022-09-05 PROCEDURE — 25010000002 ENOXAPARIN PER 10 MG: Performed by: NURSE PRACTITIONER

## 2022-09-05 PROCEDURE — 82962 GLUCOSE BLOOD TEST: CPT

## 2022-09-05 PROCEDURE — 81001 URINALYSIS AUTO W/SCOPE: CPT | Performed by: INTERNAL MEDICINE

## 2022-09-05 PROCEDURE — 96372 THER/PROPH/DIAG INJ SC/IM: CPT

## 2022-09-05 PROCEDURE — 87086 URINE CULTURE/COLONY COUNT: CPT | Performed by: INTERNAL MEDICINE

## 2022-09-05 RX ORDER — AMLODIPINE BESYLATE 10 MG/1
10 TABLET ORAL
Status: DISCONTINUED | OUTPATIENT
Start: 2022-09-06 | End: 2022-09-06 | Stop reason: HOSPADM

## 2022-09-05 RX ORDER — LISINOPRIL 40 MG/1
40 TABLET ORAL
Status: DISCONTINUED | OUTPATIENT
Start: 2022-09-06 | End: 2022-09-06 | Stop reason: HOSPADM

## 2022-09-05 RX ADMIN — INSULIN LISPRO 2 UNITS: 100 INJECTION, SOLUTION INTRAVENOUS; SUBCUTANEOUS at 06:54

## 2022-09-05 RX ADMIN — INSULIN LISPRO 6 UNITS: 100 INJECTION, SOLUTION INTRAVENOUS; SUBCUTANEOUS at 17:03

## 2022-09-05 RX ADMIN — ATORVASTATIN CALCIUM 80 MG: 80 TABLET, FILM COATED ORAL at 20:49

## 2022-09-05 RX ADMIN — ENOXAPARIN SODIUM 40 MG: 100 INJECTION SUBCUTANEOUS at 09:31

## 2022-09-05 RX ADMIN — INSULIN GLARGINE-YFGN 50 UNITS: 100 INJECTION, SOLUTION SUBCUTANEOUS at 20:49

## 2022-09-05 RX ADMIN — CHLORTHALIDONE 12.5 MG: 25 TABLET ORAL at 09:31

## 2022-09-05 RX ADMIN — AMLODIPINE BESYLATE 5 MG: 5 TABLET ORAL at 09:31

## 2022-09-05 RX ADMIN — DULOXETINE HYDROCHLORIDE 30 MG: 30 CAPSULE, DELAYED RELEASE ORAL at 09:31

## 2022-09-05 RX ADMIN — BISOPROLOL FUMARATE 5 MG: 5 TABLET, FILM COATED ORAL at 09:31

## 2022-09-05 RX ADMIN — LEVOTHYROXINE SODIUM 112 MCG: 0.11 TABLET ORAL at 09:31

## 2022-09-05 RX ADMIN — LISINOPRIL 20 MG: 20 TABLET ORAL at 09:31

## 2022-09-05 RX ADMIN — PREGABALIN 50 MG: 50 CAPSULE ORAL at 09:31

## 2022-09-05 RX ADMIN — PANTOPRAZOLE SODIUM 40 MG: 40 TABLET, DELAYED RELEASE ORAL at 06:54

## 2022-09-05 RX ADMIN — INSULIN LISPRO 4 UNITS: 100 INJECTION, SOLUTION INTRAVENOUS; SUBCUTANEOUS at 12:21

## 2022-09-05 RX ADMIN — HYDROCODONE BITARTRATE AND ACETAMINOPHEN 1 TABLET: 5; 325 TABLET ORAL at 20:49

## 2022-09-05 RX ADMIN — PREGABALIN 50 MG: 50 CAPSULE ORAL at 17:03

## 2022-09-05 NOTE — PLAN OF CARE
Goal Outcome Evaluation:  VSS  Pt has been up in the chair x 2-assisted staff to the chair  Has denied having any pain  Pt informed of plan to increase zestril and norvasc-pt verbalized understanding  Pt c/o burning when she voids -clean catch urine specimen sent   Accucks as noted  Safety maintained  Pt hoping to be discharged tomorrow

## 2022-09-05 NOTE — PROGRESS NOTES
Name: Halie Guidry ADMIT: 2022   : 1940  PCP: Napoleon Mead MD    MRN: [id number] LOS: 0 days   AGE/SEX: 82 y.o. female  ROOM: [facility]     Subjective   Subjective   CC: elevated blood pressure  No acute events. Patient complains of dysuria today. No other complaints. Taking PO. Denies CP/dyspnea/f/c/n/v/d.    Objective   Objective   Vital Signs  Temp:  [97.5 °F (36.4 °C)-98.2 °F (36.8 °C)] 97.5 °F (36.4 °C)  Heart Rate:  [58-65] 58  Resp:  [18-20] 18  BP: (170-178)/(65-78) 177/76  SpO2:  [91 %-92 %] 92 %  on   ;   Device (Oxygen Therapy): room air  Body mass index is 27.37 kg/m².  Physical Exam  Vitals and nursing note reviewed.   Constitutional:       General: She is not in acute distress.     Appearance: She is not toxic-appearing or diaphoretic.   HENT:      Head: Normocephalic and atraumatic.      Nose: Nose normal.      Mouth/Throat:      Mouth: Mucous membranes are moist.      Pharynx: Oropharynx is clear.   Eyes:      Conjunctiva/sclera: Conjunctivae normal.      Pupils: Pupils are equal, round, and reactive to light.   Cardiovascular:      Rate and Rhythm: Normal rate and regular rhythm.      Pulses: Normal pulses.   Pulmonary:      Effort: Pulmonary effort is normal.      Breath sounds: Normal breath sounds.   Abdominal:      General: Bowel sounds are normal.      Palpations: Abdomen is soft.      Tenderness: There is no abdominal tenderness.   Musculoskeletal:         General: Swelling (1+ BLE) and tenderness (BLE (chronic per patient)) present.      Cervical back: Normal range of motion and neck supple.   Skin:     General: Skin is warm and dry.      Capillary Refill: Capillary refill takes less than 2 seconds.   Neurological:      General: No focal deficit present.      Mental Status: She is alert and oriented to person, place, and time.   Psychiatric:         Mood and Affect: Mood normal.         Behavior: Behavior normal.     Results Review     I reviewed the patient's new  clinical results.  I reviewed the patient's telemetry  Results from last 7 days   Lab Units 09/04/22  0504 09/02/22  1431 09/01/22 2027   WBC 10*3/mm3 8.28 13.99* 9.46   HEMOGLOBIN g/dL 12.3 12.6 13.2   PLATELETS 10*3/mm3 249 273 301     Results from last 7 days   Lab Units 09/04/22  0504 09/02/22  1431 09/01/22 1952   SODIUM mmol/L 138 136 137   POTASSIUM mmol/L 4.0 4.0 4.5   CHLORIDE mmol/L 105 102 102   CO2 mmol/L 22.0 22.0 21.3*   BUN mg/dL 27* 31* 37*   CREATININE mg/dL 0.97 1.00 1.20*   GLUCOSE mg/dL 205* 300* 236*   EGFR mL/min/1.73 58.5* 56.4* 45.3*     Results from last 7 days   Lab Units 09/01/22 1952   ALBUMIN g/dL 3.80   BILIRUBIN mg/dL 0.4   ALK PHOS U/L 108   AST (SGOT) U/L 11   ALT (SGPT) U/L 9     Results from last 7 days   Lab Units 09/04/22  0504 09/02/22  1431 09/01/22 1952   CALCIUM mg/dL 8.9 9.1 8.9   ALBUMIN g/dL  --   --  3.80       Glucose   Date/Time Value Ref Range Status   09/05/2022 1125 246 (H) 70 - 130 mg/dL Final     Comment:     Meter: [id number] : [id number] Limomo Alvarez    09/05/2022 0614 186 (H) 70 - 130 mg/dL Final     Comment:     Meter: [id number] : [id number] [facility]   09/04/2022 2044 246 (H) 70 - 130 mg/dL Final     Comment:     Meter: [id number] : [id number] [facility]   09/04/2022 1615 149 (H) 70 - 130 mg/dL Final     Comment:     Meter: [id number] : [id number] Richard HERNANDEZ   09/04/2022 1105 241 (H) 70 - 130 mg/dL Final     Comment:     Meter: [id number] : [id number] Richard Denis MARY   09/04/2022 0609 189 (H) 70 - 130 mg/dL Final     Comment:     Meter: [id number] : [id number] Leti HERNANDEZ   09/03/2022 2025 170 (H) 70 - 130 mg/dL Final     Comment:     Meter: [id number] : [id number] Leti HERNANDEZ       No radiology results for the last day  Scheduled Medications  [START ON 9/6/2022] amLODIPine, 10 mg, Oral, Q24H  atorvastatin, 80 mg, Oral, Nightly  bisoprolol, 5 mg, Oral, Daily  chlorthalidone, 12.5 mg,  Oral, Daily  DULoxetine, 30 mg, Oral, Daily  enoxaparin, 40 mg, Subcutaneous, Daily  insulin glargine, 50 Units, Subcutaneous, Nightly  insulin lispro, 0-9 Units, Subcutaneous, TID AC  levothyroxine, 112 mcg, Oral, Daily  [START ON 9/6/2022] lisinopril, 40 mg, Oral, Q24H  pantoprazole, 40 mg, Oral, QAM  pregabalin, 50 mg, Oral, TID    Infusions  niCARdipine, 5-15 mg/hr, Last Rate: Stopped (09/03/22 1144)    Diet  Diet Regular; Cardiac, Consistent Carbohydrate       Assessment/Plan     Active Hospital Problems    Diagnosis  POA   • **Accelerated hypertension [I10]  Yes   • Anxiety disorder [F41.9]  Yes   • Seizure (HCC) [R56.9]  Yes   • Type 2 diabetes mellitus, with long-term current use of insulin (HCC) [E11.9, Z79.4]  Not Applicable   • Primary hypothyroidism [E03.9]  Yes   • Hyperlipidemia [E78.5]  Yes   • Benign essential hypertension [I10]  Yes   • Stage 3a chronic kidney disease (HCC) [N18.31]  Yes      Resolved Hospital Problems   No resolved problems to display.   Hypertensive Urgency  - she has been weaned off of the cardene drip  - continue bisoprolol and chlorthalidone  - lisinopril and amlodipine increased  - appreciate nephrology recs    Type 2 DM  - continue on ssi/hypoglycemia protocol  - continue lantus 50 units nightly    Hypothyroidism  - TSH is borderline elevated  - continue on levothyroxine at current dose and follow up TSH in 4-6 weeks with her PCP    CKD Stage 3a  - baseline creatinine 1.0-1.2  - monitor BMP    Dysuria  - check UA    Lovenox 40 mg SC daily for DVT prophylaxis.  Full code.  Discussed with patient and nursing staff.  Anticipate discharge home tomorrow if BP improves      Lewis Sheppard MD  [facility]  09/05/22  16:05 EDT

## 2022-09-05 NOTE — PROGRESS NOTES
[facility] Progress Note      Patient Name: Halie Guidry  : 1940  MRN: [id number]  Primary Care Physician:  Napoleon Mead MD  Date of admission: 2022    Subjective     Interval History:   For hypertension  Blood pressure remains above goal.  Off Cardene drip    Review of Systems:   As noted above    Objective     Vitals:   Temp:  [97.4 °F (36.3 °C)-98.2 °F (36.8 °C)] 97.5 °F (36.4 °C)  Heart Rate:  [58-66] 58  Resp:  [18-20] 18  BP: (154-178)/(65-83) 178/65    Intake/Output Summary (Last 24 hours) at 2022 1053  Last data filed at 2022 0910  Gross per 24 hour   Intake --   Output 1900 ml   Net -1900 ml       Physical Exam:    General Appearance: NAD  HEENT: oral mucosa normal, nonicteric sclera  Neck: supple, no JVD  Lungs: CTA  Heart: RRR, normal S1 and S2  Abdomen: soft, nondistended  Extremities: no edema  Neuro: Awake alert and moving all extremities    Scheduled Meds:     amLODIPine, 5 mg, Oral, Q24H  atorvastatin, 80 mg, Oral, Nightly  bisoprolol, 5 mg, Oral, Daily  chlorthalidone, 12.5 mg, Oral, Daily  DULoxetine, 30 mg, Oral, Daily  enoxaparin, 40 mg, Subcutaneous, Daily  insulin glargine, 50 Units, Subcutaneous, Nightly  insulin lispro, 0-9 Units, Subcutaneous, TID AC  levothyroxine, 112 mcg, Oral, Daily  lisinopril, 20 mg, Oral, Q24H  pantoprazole, 40 mg, Oral, QAM  pregabalin, 50 mg, Oral, TID      IV Meds:   niCARdipine, 5-15 mg/hr, Last Rate: Stopped (22 1144)        Results Reviewed:   I have personally reviewed the results from the time of this admission to 2022 10:53 EDT     Results from last 7 days   Lab Units 22  0504 22  1431 22  1952   SODIUM mmol/L 138 136 137   POTASSIUM mmol/L 4.0 4.0 4.5   CHLORIDE mmol/L 105 102 102   CO2 mmol/L 22.0 22.0 21.3*   BUN mg/dL 27* 31* 37*   CREATININE mg/dL 0.97 1.00 1.20*   CALCIUM mg/dL 8.9 9.1 8.9   BILIRUBIN mg/dL  --   --  0.4   ALK PHOS U/L  --   --  108   ALT (SGPT) U/L   --   --  9   AST (SGOT) U/L  --   --  11   GLUCOSE mg/dL 205* 300* 236*     Estimated Creatinine Clearance: 50.1 mL/min (by C-G formula based on SCr of 0.97 mg/dL).          Results from last 7 days   Lab Units 09/04/22  0504 09/02/22  1431 09/01/22 2027   WBC 10*3/mm3 8.28 13.99* 9.46   HEMOGLOBIN g/dL 12.3 12.6 13.2   PLATELETS 10*3/mm3 249 273 301           Assessment / Plan     ASSESSMENT:  - Chronic kidney disease stage III, baseline creatinine 1.0-1.2, stable kidney function.  Urine protein to creatinine ratio of 1.9 g/g suspect secondary to diabetic nephropathy in the setting of uncontrolled hypertension- Uncontrolled hypertension  - Diabetes with hemoglobin A1c of 7.7  - Iron deficiency with acceptable hemoglobin     PLAN:    -Continue  chlorthalidone 12.5 mg p.o. once daily and increase Amlodipine to 10 mg po daily and lisinopril 40 mg p.o. once daily  -Follow low-salt diet      Roberto Galeana MD  09/05/22  10:53 EDT    [facility]  [phone]

## 2022-09-06 ENCOUNTER — READMISSION MANAGEMENT (OUTPATIENT)
Dept: CALL CENTER | Facility: HOSPITAL | Age: 82
End: 2022-09-06

## 2022-09-06 VITALS
OXYGEN SATURATION: 92 % | BODY MASS INDEX: 27.28 KG/M2 | SYSTOLIC BLOOD PRESSURE: 171 MMHG | WEIGHT: 180 LBS | HEART RATE: 57 BPM | HEIGHT: 68 IN | DIASTOLIC BLOOD PRESSURE: 77 MMHG | RESPIRATION RATE: 20 BRPM | TEMPERATURE: 98.5 F

## 2022-09-06 PROBLEM — N30.00 ACUTE CYSTITIS WITHOUT HEMATURIA: Status: ACTIVE | Noted: 2022-09-06

## 2022-09-06 LAB
BACTERIA SPEC AEROBE CULT: NO GROWTH
GLUCOSE BLDC GLUCOMTR-MCNC: 240 MG/DL (ref 70–130)
GLUCOSE BLDC GLUCOMTR-MCNC: 248 MG/DL (ref 70–130)

## 2022-09-06 PROCEDURE — 82962 GLUCOSE BLOOD TEST: CPT

## 2022-09-06 PROCEDURE — G0378 HOSPITAL OBSERVATION PER HR: HCPCS

## 2022-09-06 PROCEDURE — 25010000002 ENOXAPARIN PER 10 MG: Performed by: NURSE PRACTITIONER

## 2022-09-06 PROCEDURE — 96372 THER/PROPH/DIAG INJ SC/IM: CPT

## 2022-09-06 PROCEDURE — 63710000001 INSULIN LISPRO (HUMAN) PER 5 UNITS: Performed by: NURSE PRACTITIONER

## 2022-09-06 PROCEDURE — 97530 THERAPEUTIC ACTIVITIES: CPT

## 2022-09-06 RX ORDER — CEFDINIR 300 MG/1
300 CAPSULE ORAL EVERY 12 HOURS SCHEDULED
Qty: 5 CAPSULE | Refills: 0 | Status: SHIPPED | OUTPATIENT
Start: 2022-09-06 | End: 2022-09-09

## 2022-09-06 RX ORDER — CHLORTHALIDONE 25 MG/1
25 TABLET ORAL DAILY
Qty: 30 TABLET | Refills: 0 | Status: SHIPPED | OUTPATIENT
Start: 2022-09-07 | End: 2022-09-12 | Stop reason: SDUPTHER

## 2022-09-06 RX ORDER — CHLORTHALIDONE 25 MG/1
12.5 TABLET ORAL ONCE
Status: COMPLETED | OUTPATIENT
Start: 2022-09-06 | End: 2022-09-06

## 2022-09-06 RX ORDER — BISOPROLOL FUMARATE 5 MG/1
5 TABLET, FILM COATED ORAL DAILY
Qty: 30 TABLET | Refills: 0 | Status: SHIPPED | OUTPATIENT
Start: 2022-09-07 | End: 2022-09-12 | Stop reason: SDUPTHER

## 2022-09-06 RX ORDER — CHLORTHALIDONE 25 MG/1
25 TABLET ORAL DAILY
Status: DISCONTINUED | OUTPATIENT
Start: 2022-09-07 | End: 2022-09-06 | Stop reason: HOSPADM

## 2022-09-06 RX ORDER — CEFDINIR 300 MG/1
300 CAPSULE ORAL EVERY 12 HOURS SCHEDULED
Status: DISCONTINUED | OUTPATIENT
Start: 2022-09-06 | End: 2022-09-06 | Stop reason: HOSPADM

## 2022-09-06 RX ORDER — LISINOPRIL 40 MG/1
40 TABLET ORAL
Qty: 30 TABLET | Refills: 0 | Status: SHIPPED | OUTPATIENT
Start: 2022-09-07 | End: 2022-09-12 | Stop reason: SDUPTHER

## 2022-09-06 RX ORDER — AMLODIPINE BESYLATE 10 MG/1
10 TABLET ORAL
Qty: 30 TABLET | Refills: 0 | Status: SHIPPED | OUTPATIENT
Start: 2022-09-07 | End: 2022-09-12 | Stop reason: SDUPTHER

## 2022-09-06 RX ADMIN — INSULIN LISPRO 4 UNITS: 100 INJECTION, SOLUTION INTRAVENOUS; SUBCUTANEOUS at 13:18

## 2022-09-06 RX ADMIN — CEFDINIR 300 MG: 300 CAPSULE ORAL at 11:35

## 2022-09-06 RX ADMIN — LISINOPRIL 40 MG: 40 TABLET ORAL at 08:18

## 2022-09-06 RX ADMIN — LEVOTHYROXINE SODIUM 112 MCG: 0.11 TABLET ORAL at 08:19

## 2022-09-06 RX ADMIN — ENOXAPARIN SODIUM 40 MG: 100 INJECTION SUBCUTANEOUS at 08:14

## 2022-09-06 RX ADMIN — PREGABALIN 50 MG: 50 CAPSULE ORAL at 08:18

## 2022-09-06 RX ADMIN — DULOXETINE HYDROCHLORIDE 30 MG: 30 CAPSULE, DELAYED RELEASE ORAL at 08:19

## 2022-09-06 RX ADMIN — BISOPROLOL FUMARATE 5 MG: 5 TABLET, FILM COATED ORAL at 08:19

## 2022-09-06 RX ADMIN — CHLORTHALIDONE 12.5 MG: 25 TABLET ORAL at 08:18

## 2022-09-06 RX ADMIN — CHLORTHALIDONE 12.5 MG: 25 TABLET ORAL at 11:35

## 2022-09-06 RX ADMIN — AMLODIPINE BESYLATE 10 MG: 10 TABLET ORAL at 08:19

## 2022-09-06 RX ADMIN — PREGABALIN 50 MG: 50 CAPSULE ORAL at 00:17

## 2022-09-06 RX ADMIN — INSULIN LISPRO 4 UNITS: 100 INJECTION, SOLUTION INTRAVENOUS; SUBCUTANEOUS at 08:13

## 2022-09-06 RX ADMIN — Medication 10 ML: at 08:16

## 2022-09-06 RX ADMIN — PANTOPRAZOLE SODIUM 40 MG: 40 TABLET, DELAYED RELEASE ORAL at 06:43

## 2022-09-06 NOTE — DISCHARGE SUMMARY
"Date of Admission: 9/1/2022  Date of Discharge:  9/6/2022  Primary Care Physician: Napoleon Mead MD     Discharge Diagnosis:  Active Hospital Problems    Diagnosis  POA   • **Accelerated hypertension [I10]  Yes   • Acute cystitis without hematuria [N30.00]  Yes   • Anxiety disorder [F41.9]  Yes   • Seizure (HCC) [R56.9]  Yes   • Type 2 diabetes mellitus, with long-term current use of insulin (HCC) [E11.9, Z79.4]  Not Applicable   • Primary hypothyroidism [E03.9]  Yes   • Hyperlipidemia [E78.5]  Yes   • Benign essential hypertension [I10]  Yes   • Stage 3a chronic kidney disease (HCC) [N18.31]  Yes      Resolved Hospital Problems   No resolved problems to display.       Presenting Problem/History of Present Illness:  Dehydration [E86.0]  Accelerated hypertension [I10]  Hyperglycemia [R73.9]  Tinnitus of both ears [H93.13]     Hospital Course:  The patient is a 82 y.o. female with a history of HTN, HLD, Type 2 DM, and CKD Stage 3a who presented with elevated blood pressures of greater than 200mmHg. Please see admission H&P for further details. She was initially started on a cardene drip and her oral medications were adjusted. She did not have evidence of end-organ damage. She was weaned off of the cardene drip and her blood pressures have remained stable though they are still elevated above optimal range. She will be discharged on the regimen below and keep close PCP follow up. Further adjustments can be made as an outpatient. She was followed by nephrology and she should keep scheduled follow up with them as well.   Of note she developed dysuria during the hospitalization and urinalysis was consistent with acute cystitis. She was started on a short course of cefdinir and this will be finished as an outpatient.     Exam Today:  Blood pressure 171/77, pulse 57, temperature 98.5 °F (36.9 °C), temperature source Oral, resp. rate 20, height 172.7 cm (68\"), weight 81.6 kg (180 lb), SpO2 92 %, not currently " breastfeeding.  Vitals and nursing note reviewed.   Constitutional:       General: She is not in acute distress.     Appearance: She is not toxic-appearing or diaphoretic.   HENT:      Head: Normocephalic and atraumatic.      Nose: Nose normal.      Mouth/Throat:      Mouth: Mucous membranes are moist.      Pharynx: Oropharynx is clear.   Eyes:      Conjunctiva/sclera: Conjunctivae normal.      Pupils: Pupils are equal, round, and reactive to light.   Cardiovascular:      Rate and Rhythm: Normal rate and regular rhythm.      Pulses: Normal pulses.   Pulmonary:      Effort: Pulmonary effort is normal.      Breath sounds: Normal breath sounds.   Abdominal:      General: Bowel sounds are normal.      Palpations: Abdomen is soft.      Tenderness: There is no abdominal tenderness.   Musculoskeletal:         General: Swelling (1+ BLE) and tenderness (BLE (chronic per patient)) present.      Cervical back: Normal range of motion and neck supple.   Skin:     General: Skin is warm and dry.      Capillary Refill: Capillary refill takes less than 2 seconds.   Neurological:      General: No focal deficit present.      Mental Status: She is alert and oriented to person, place, and time.   Psychiatric:         Mood and Affect: Mood normal.         Behavior: Behavior normal.       Consults:   Consults     Date and Time Order Name Status Description    9/1/2022 11:55 PM Inpatient Nephrology Consult Completed     9/1/2022 10:28 PM [facility] (on-call MD unless specified) Details             Discharge Disposition:  Home or Self Care    Discharge Medications:     Discharge Medications      New Medications      Instructions Start Date   cefdinir 300 MG capsule  Commonly known as: OMNICEF   300 mg, Oral, Every 12 Hours Scheduled      chlorthalidone 25 MG tablet  Commonly known as: HYGROTON   25 mg, Oral, Daily   Start Date: September 7, 2022        Changes to Medications      Instructions Start Date   amLODIPine 10 MG tablet  Commonly known  as: NORVASC  What changed:   · medication strength  · how much to take   10 mg, Oral, Every 24 Hours Scheduled   Start Date: September 7, 2022     bisoprolol 5 MG tablet  Commonly known as: ZEBeta  What changed: how much to take   5 mg, Oral, Daily   Start Date: September 7, 2022     lisinopril 40 MG tablet  Commonly known as: MAGALISILZESTRIL  What changed:   · medication strength  · how much to take  · when to take this   40 mg, Oral, Every 24 Hours Scheduled   Start Date: September 7, 2022     Trulicity 1.5 MG/0.5ML solution pen-injector  Generic drug: Dulaglutide  What changed: additional instructions   1.5 mg, Subcutaneous, Weekly         Continue These Medications      Instructions Start Date   acetaminophen 325 MG tablet  Commonly known as: TYLENOL   650 mg, Oral, Every 6 Hours PRN      aspirin 81 MG EC tablet   81 mg, Oral, Daily      atorvastatin 80 MG tablet  Commonly known as: LIPITOR   TAKE 1 TABLET EVERY NIGHT      BD Pen Needle Naila 2nd Gen 32G X 4 MM misc  Generic drug: Insulin Pen Needle   USE TO INJECT INSULIN FOUR TIMES DAILY      cyanocobalamin 1000 MCG tablet  Commonly known as: VITAMIN B-12   1,000 mcg, Oral, Daily      DULoxetine 30 MG capsule  Commonly known as: CYMBALTA   30 mg, Oral, Daily      famotidine 20 MG tablet  Commonly known as: PEPCID   20 mg, Oral, Daily Before Supper      HYDROcodone-acetaminophen 5-325 MG per tablet  Commonly known as: NORCO   1 tablet, Oral, Every 6 Hours PRN      hydrocortisone-zinc oxide-bacitracin-nystatin cream   1 application, Topical, 2 Times Daily PRN      Insulin Lispro (1 Unit Dial) 100 UNIT/ML solution pen-injector  Commonly known as: HUMALOG   6 Units, Subcutaneous, 3 Times Daily With Meals      lansoprazole 15 MG capsule  Commonly known as: PREVACID   15 mg, Oral, Daily      levothyroxine 112 MCG tablet  Commonly known as: SYNTHROID, LEVOTHROID   TAKE 1 TABLET DAILY      pregabalin 50 MG capsule  Commonly known as: LYRICA   50 mg, Oral, 3 Times  Daily      Insulin Glargine (2 Unit Dial) 300 UNIT/ML solution pen-injector injection  Commonly known as: TOUJEO   60 Units, Subcutaneous, Nightly      Toujeo SoloStar 300 UNIT/ML solution pen-injector injection  Generic drug: Insulin Glargine (1 Unit Dial)   18 Units, Subcutaneous, Nightly         Stop These Medications    hydroCHLOROthiazide 12.5 MG capsule  Commonly known as: MICROZIDE            Discharge Diet:   Diet Instructions     Diet: Consistent Carbohydrate, Cardiac, Regular; Thin      Discharge Diet:  Consistent Carbohydrate  Cardiac  Regular       Fluid Consistency: Thin          Activity at Discharge:   Activity Instructions     Activity as Tolerated     Additional Activity Instructions:    With assistance as needed.           Follow-up Appointments:  Future Appointments   Date Time Provider Department Center   9/13/2022  1:30 PM Napoleon Mead MD [id number]   10/26/2022  1:00 PM [facility]   11/9/2022  1:00 PM Kenya Pruett APRN [facility]   4/10/2023  2:00 PM Solomon Barrow MD [facility]     Additional Instructions for the Follow-ups that You Need to Schedule     Discharge Follow-up with PCP   As directed       Currently Documented PCP:    Napoleon Mead MD    PCP Phone Number:    [phone]     Follow Up Details: 1 week               Test Results Pending at Discharge:  Pending Labs     Order Current Status    Urine Culture - Urine, Urine, Clean Catch Preliminary result           Lewis Sheppard MD  09/06/22  16:12 EDT    Time Spent on Discharge Activities: Greater than 30 minutes

## 2022-09-06 NOTE — OUTREACH NOTE
Prep Survey    Flowsheet Row Responses   [facility] patient discharged from? [place]   Is LACE score < 7 ? No   Emergency Room discharge w/ pulse ox? No   Eligibility [facility]   Date of Admission 09/01/22   Date of Discharge 09/06/22   Discharge Disposition Home or Self Care   Discharge diagnosis Hypertensive Urgency   Does the patient have one of the following disease processes/diagnoses(primary or secondary)? Other   Does the patient have Home health ordered? Yes   What is the Home health agency?   [facility]   Is there a DME ordered? No   Prep survey completed? Yes          SYEDA LIMA - Registered Nurse

## 2022-09-06 NOTE — THERAPY TREATMENT NOTE
Patient Name: Halie Guidry  : 1940    MRN: [id number]                              Today's Date: 2022       Admit Date: 2022    Visit Dx:     ICD-10-CM ICD-9-CM   1. Accelerated hypertension  I10 401.0   2. Dehydration  E86.0 276.51   3. Hyperglycemia  R73.9 790.29   4. Tinnitus of both ears  H93.13 388.30     Patient Active Problem List   Diagnosis   • Compression fracture   • Lumbar degenerative disc disease   • Type 2 diabetes mellitus, with long-term current use of insulin (Formerly [facility])   • Hyperlipidemia   • Benign essential hypertension   • Primary hypothyroidism   • Leukocytosis   • Neuropathy   • Osteoporosis   • Proteinuria   • Tobacco abuse   • Diabetic eye exam (Formerly [facility])   • Generalized weakness   • Spinal stenosis   • Scoliosis   • Arthritis   • VBI (vertebrobasilar insufficiency)   • Orthostatic hypotension   • 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 ([facility])   • Severe hypothyroidism   • Noncompliance with medication regimen   • Vitamin D deficiency disease   • Altered mental status   • Tremor   • Seizure ([facility]ly [facility])   • Stage 3a chronic kidney disease ([facility])   • Thoracic degenerative disc disease   • Metabolic encephalopathy   • VIPUL (acute kidney injury) (Formerly [facility])   • Hyperglycemia   • Type II diabetes mellitus, uncontrolled   • Lower abdominal pain   • Transaminitis   • COVID-19 virus detected   • UTI (urinary tract infection)   • Emphysematous cystitis   • Choledocholithiasis   • Hypokalemia   • Hypoxia   • Generalized abdominal pain   • History of Clostridium difficile infection   • History of ERCP   • Acute UTI (urinary tract infection)   • Hypomagnesemia   • Burning pain   • Anxiety disorder   • Neuropathic pain   • Intertrigo   • Weakness of both lower extremities   • Acute metabolic encephalopathy   • Accelerated hypertension   • Acute cystitis without hematuria     Past Medical History:   Diagnosis Date   • Anxiety    • Arthritis    • Benign essential hypertension 2014   •  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HCC)    • Depression    • Diabetes (HCC)    • Diabetes mellitus, type 2 (HCC)    • Disc degeneration, lumbar    • Headache, tension-type    • Hyperlipidemia    • Hypothyroidism    • Neuropathy    • Osteoporosis 09/09/2015   • Peripheral neuropathy    • Rotator cuff tear, left    • Scoliosis    • Shoulder pain     LEFT, TORN ROTATOR CUFF S/P FALL   • Spinal stenosis      Past Surgical History:   Procedure Laterality Date   • APPENDECTOMY     • BILATERAL BREAST REDUCTION Bilateral 08/2015   • CATARACT EXTRACTION  03/2015   • CHOLECYSTECTOMY WITH INTRAOPERATIVE CHOLANGIOGRAM N/A 3/27/2022    Procedure: CHOLECYSTECTOMY LAPAROSCOPIC INTRAOPERATIVE CHOLANGIOGRAM;  Surgeon: Aiyana Hill MD;  Location: [facility] MAIN OR;  Service: General;  Laterality: N/A;   • COLONOSCOPY  06/05/2015    WNL   • ERCP N/A 2/25/2022    Procedure: ENDOSCOPIC RETROGRADE CHOLANGIOPANCREATOGRAPHY with sphincterotomy and balloon sweep;  Surgeon: Chilo Wilhelm MD;  Location: [facility] ENDOSCOPY;  Service: Gastroenterology;  Laterality: N/A;  PRE/POST - CBD stones   • ERCP N/A 3/28/2022    Procedure: ENDOSCOPIC RETROGRADE CHOLANGIOPANCREATOGRAPHY WITH SPHINCTEROTOMY AND BALLOON SWEEP;  Surgeon: Chilo Wilhelm MD;  Location: [facility] ENDOSCOPY;  Service: Gastroenterology;  Laterality: N/A;  PRE: COMMON DUCT STONE  POST: COMMON DUCT STONE   • KYPHOPLASTY     • REDUCTION MAMMAPLASTY     • TONSILLECTOMY        General Information     Row Name 09/06/22 1137          Physical Therapy Time and Intention    Document Type therapy note (daily note)  -EE (r) JF (t) EE (c)     Mode of Treatment physical therapy;individual therapy  -EE (r) JF (t) EE (c)     Row Name 09/06/22 1137          General Information    Existing Precautions/Restrictions fall  -EE (r) JF (t) EE (c)     Row Name 09/06/22 1137          Cognition    Orientation Status (Cognition) oriented x 3  -EE (r) JF (t) EE (c)           User Key  (r) = Recorded By, (t) =  Taken By, (c) = Cosigned By    Initials Name Provider Type    EE Vaishali Short, PT Physical Therapist    Napoleon Adrian, PT Student PT Student               Mobility     Row Name 09/06/22 1137          Bed Mobility    Bed Mobility supine-sit  -EE (r) JF (t) EE (c)     Supine-Sit [place] (Bed Mobility) standby assist  -EE (r) JF (t) EE (c)     Assistive Device (Bed Mobility) head of bed elevated;bed rails  -EE (r) JF (t) EE (c)     Row Name 09/06/22 1137          Sit-Stand Transfer    Sit-Stand [place] (Transfers) moderate assist (50% patient effort)  -EE (r) JF (t) EE (c)     Assistive Device (Sit-Stand Transfers) walker, front-wheeled  -EE (r) JF (t) EE (c)     Comment, (Sit-Stand Transfer) x2 from EOB, cues for hand placement  -EE (r) JF (t) EE (c)     Row Name 09/06/22 1137          Gait/Stairs (Locomotion)    [place] Level (Gait) contact guard;minimum assist (75% patient effort)  -EE (r) JF (t) EE (c)     Assistive Device (Gait) walker, front-wheeled  -EE (r) JF (t) EE (c)     Distance in Feet (Gait) 20'  -EE (r) JF (t) EE (c)     Deviations/Abnormal Patterns (Gait) gait speed decreased;stride length decreased  -EE (r) JF (t) EE (c)     Bilateral Gait Deviations forward flexed posture  -EE (r) JF (t) EE (c)     Comment, (Gait/Stairs) Around EOB to chair. Unable to come into full upright position. Cues to stay close to walker  -EE (r) JF (t) EE (c)           User Key  (r) = Recorded By, (t) = Taken By, (c) = Cosigned By    Initials Name Provider Type    Vaishali Rg, PT Physical Therapist    Napoleon Adrian, PT Student PT Student               Obj/Interventions     Row Name 09/06/22 1141          Motor Skills    Therapeutic Exercise other (see comments)  Ankle pumps, LAQ, seated marches [id number]  -EE (r) JF (t) EE (c)     Row Name 09/06/22 1141          Balance    Balance Assessment sitting static balance  -EE (r) JF (t) EE (c)     Static Sitting Balance supervision  -EE (r) JF (t) EE  (c)           User Key  (r) = Recorded By, (t) = Taken By, (c) = Cosigned By    Initials Name Provider Type    Vaishali Rg, PT Physical Therapist    Napoleon Adrian, PT Student PT Student               Goals/Plan    No documentation.                Clinical Impression     Row Name 09/06/22 1142          Pain    Pretreatment Pain Rating 0/10 - no pain  -EE (r) JF (t) EE (c)     Posttreatment Pain Rating 0/10 - no pain  -EE (r) JF (t) EE (c)     [facility] Name 09/06/22 1142          Plan of Care Review    Plan of Care Reviewed With patient  -EE (r) JF (t) EE (c)     Progress declining  -EE (r) JF (t) EE (c)     Outcome Evaluation Pt was SBA to sit up on EOB, required assist to don grippy socks. Mod A to stand from EOB with rwx, cues for hand placement and upright posture, unable to achieve full upright posture. Min A for gait 20' around bed to chair, pt displays forward flexed posture, decreased tess, and shakiness but no overt LOB. Performed seated exercises in chair. Pt will continue to benefit from PT to address impairments.  -EE (r) JF (t) EE (c)     Row Name 09/06/22 1142          Therapy Assessment/Plan (PT)    Criteria for Skilled Interventions Met (PT) yes  -EE (r) JF (t) EE (c)     Row Name 09/06/22 1142          Positioning and Restraints    Pre-Treatment Position in bed  -EE (r) JF (t) EE (c)     Post Treatment Position chair  -EE (r) JF (t) EE (c)     In Chair notified nsg;reclined;sitting;call light within reach;encouraged to call for assist;exit alarm on  -EE (r) JF (t) EE (c)           User Key  (r) = Recorded By, (t) = Taken By, (c) = Cosigned By    Initials Name Provider Type    Vaishali Rg, PT Physical Therapist    Napoleon Adrian, PT Student PT Student               Outcome Measures     Row Name 09/06/22 1147          How much help from another person do you currently need...    Turning from your back to your side while in flat bed without using bedrails? 4  -EE (r) JF (t) EE (c)      Moving from lying on back to sitting on the side of a flat bed without bedrails? 4  -EE (r) JF (t) EE (c)     Moving to and from a bed to a chair (including a wheelchair)? 2  -EE (r) JF (t) EE (c)     Standing up from a chair using your arms (e.g., wheelchair, bedside chair)? 2  -EE (r) JF (t) EE (c)     Climbing 3-5 steps with a railing? 2  -EE (r) JF (t) EE (c)     To walk in hospital room? 3  -EE (r) JF (t) EE (c)     AM-PAC 6 Clicks Score (PT) 17  -EE (r) JF (t)     Highest level of mobility 5 --> Static standing  -EE (r) JF (t)     Row Name 09/06/22 1147          Functional Assessment    Outcome Measure Options AM-PAC 6 Clicks Basic Mobility (PT)  -EE (r) JF (t) EE (c)           User Key  (r) = Recorded By, (t) = Taken By, (c) = Cosigned By    Initials Name Provider Type    Vaishali Rg, PT Physical Therapist    Napoleon Adrian, PT Student PT Student                             Physical Therapy Education                 Title: PT OT SLP Therapies (In Progress)     Topic: Physical Therapy (In Progress)     Point: Mobility training (Done)     Learning Progress Summary           Patient Acceptance, E, VU,NR by PRIMITIVO at 9/6/2022 1147    Acceptance, E,TB, VU,NR by IN at 9/4/2022 1740    Comment: Ambulated 60 ft CGA/[place] with Rwx.    Acceptance, E, VU,NR by  at 9/2/2022 1428                   Point: Home exercise program (Not Started)     Learner Progress:  Not documented in this visit.          Point: Body mechanics (Done)     Learning Progress Summary           Patient Acceptance, E, VU,NR by  at 9/6/2022 1147    Acceptance, E, VU,NR by  at 9/2/2022 1428                   Point: Precautions (Done)     Learning Progress Summary           Patient Acceptance, E, VU,NR by  at 9/6/2022 1147    Acceptance, E, VU,NR by  at 9/2/2022 1428                               User Key     Initials Effective Dates Name Provider Type Discipline     06/16/21 -  Hetal Velasco, PT Physical Therapist PT    TATE 05/27/22  -  Ziggy Avalos, PT Physical Therapist PT    PRIMITIVO 07/28/22 -  Napoleon Fernandez PT Student PT Student PT              PT Recommendation and Plan     Plan of Care Reviewed With: patient  Progress: declining  Outcome Evaluation: Pt was SBA to sit up on EOB, required assist to don grippy socks. Mod A to stand from EOB with rwx, cues for hand placement and upright posture, unable to achieve full upright posture. Min A for gait 20' around bed to chair, pt displays forward flexed posture, decreased tess, and shakiness but no overt LOB. Performed seated exercises in chair. Pt will continue to benefit from PT to address impairments.     Time Calculation:    PT Charges     Row Name 09/06/22 1148             Time Calculation    Start Time 1110  -EE (r) JF (t) EE (c)      Stop Time 1132  -EE (r) JF (t) EE (c)      Time Calculation (min) 22 min  -EE (r) JF (t)      PT Received On 09/06/22  -EE (r) JF (t) EE (c)      PT - Next Appointment 09/07/22  -EE (r) JF (t) EE (c)              Time Calculation- PT    Total Timed Code Minutes- PT 22 minute(s)  -EE (r) JF (t) EE (c)              Timed Charges    86579 - PT Therapeutic Activity Minutes 22  -EE (r) JF (t) EE (c)              Total Minutes    Timed Charges Total Minutes 22  -EE (r) JF (t)       Total Minutes 22  -EE (r) JF (t)            User Key  (r) = Recorded By, (t) = Taken By, (c) = Cosigned By    Initials Name Provider Type    Vaishali Rg, PT Physical Therapist    Napoleon Adrian, PT Student PT Student              Therapy Charges for Today     Code Description Service Date Service Provider Modifiers Qty    81902681830 HC PT THERAPEUTIC ACT EA 15 MIN 9/6/2022 Napoleon Fernandez PT Student GP 1          PT G-Codes  Outcome Measure Options: AM-PAC 6 Clicks Basic Mobility (PT)  AM-PAC 6 Clicks Score (PT): 17  AM-PAC 6 Clicks Score (OT): 16    MELODIE Feliciano  9/6/2022

## 2022-09-06 NOTE — PLAN OF CARE
Goal Outcome Evaluation:  Plan of Care Reviewed With: patient        Progress: declining  Outcome Evaluation: Pt was SBA to sit up on EOB, required assist to don grippy socks. Mod A to stand from EOB with rwx, cues for hand placement and upright posture, unable to achieve full upright posture. Min A for gait 20' around bed to chair, pt displays forward flexed posture, decreased tess, and shakiness but no overt LOB. Performed seated exercises in chair. Pt will continue to benefit from PT to address impairments.

## 2022-09-06 NOTE — PROGRESS NOTES
Patient being dc. Getting DC instructions from RN.  I have follow up arranged next Wed. Sept 14 at 10:45 am with [facility]. [facility]. Address and phone number written on dc papers for patient.

## 2022-09-06 NOTE — PLAN OF CARE
Goal Outcome Evaluation:              Outcome Evaluation: Patient was up in chair in evening. Purewick in place. Maintain safety and continue to monitor.

## 2022-09-07 ENCOUNTER — TRANSITIONAL CARE MANAGEMENT TELEPHONE ENCOUNTER (OUTPATIENT)
Dept: CALL CENTER | Facility: HOSPITAL | Age: 82
End: 2022-09-07

## 2022-09-07 NOTE — OUTREACH NOTE
Call Center TCM Note    Flowsheet Row Responses   [facility] patient discharged from? [place]   Does the patient have one of the following disease processes/diagnoses(primary or secondary)? Other   TCM attempt successful? No   Unsuccessful attempts Attempt 1          Kelsey Victor MA    9/7/2022, 10:39 EDT

## 2022-09-07 NOTE — OUTREACH NOTE
Call Center TCM Note    Flowsheet Row Responses   [facility] patient discharged from? [place]   Does the patient have one of the following disease processes/diagnoses(primary or secondary)? Other   TCM attempt successful? Yes   Discharge diagnosis Hypertensive Urgency   Meds reviewed with patient/caregiver? Yes   Is the patient having any side effects they believe may be caused by any medication additions or changes? No   Does the patient have all medications ordered at discharge? Yes   Is the patient taking all medications as directed (includes completed medication regime)? Yes   What is the Home health agency?   [facility]    Has home health visited the patient within 72 hours of discharge? Yes   Psychosocial issues? No   TCM call completed? Yes   Wrap up additional comments D/C DX: Hypertensive Urgency ** Pt doing ok, all new rx's and changes in place. BP is better. No questions at this time. TCM APPT with PCP Dr Mead is 09/13/2022          Kelsey Victor MA    9/7/2022, 16:05 EDT

## 2022-09-08 NOTE — CASE MANAGEMENT/SOCIAL WORK
Case Management Discharge Note      Final Note: Home with [facility] home health and family assist. Declines SNF         Selected Continued Care - Discharged on 9/6/2022 Admission date: 9/1/2022 - Discharge disposition: Home or Self Care    Destination    No services have been selected for the patient.              Durable Medical Equipment    No services have been selected for the patient.              Dialysis/Infusion    No services have been selected for the patient.              Home Medical Care Coordination complete.    Service Provider Selected Services Address Phone Fax Patient Preferred    [facility]  [facility] [address], [place], operated [facility], [address], [place] [address] [phone] --          Therapy    No services have been selected for the patient.              Community Resources    No services have been selected for the patient.              Community & DME    No services have been selected for the patient.                  Transportation Services  Private: Car    Final Discharge Disposition Code: 06 - home with home health care

## 2022-09-09 ENCOUNTER — TELEPHONE (OUTPATIENT)
Dept: FAMILY MEDICINE CLINIC | Facility: CLINIC | Age: 82
End: 2022-09-09

## 2022-09-09 NOTE — TELEPHONE ENCOUNTER
Caller: GRACIELA MAURER     Best call back number: [phone]    What orders are you requesting (i.e. lab or imaging): PT 2 TIMES A WEEK FOR 3 WEEKS  AND 1 TIME A WEEK FOR 5 WEEKS ALSO WANTS TO ADD NURSING BACK AS PATIENT JUST GOT OUT OF THE HOSPITAL     In what timeframe would the patient need to come in: ASAP

## 2022-09-12 NOTE — PROGRESS NOTES
Transitional Care Follow Up Visit  Subjective     Halie Guidry is a 82 y.o. female who presents for a transitional care management visit.    Within 48 business hours after discharge our office contacted her via telephone to coordinate her care and needs.      I reviewed and discussed the details of that call along with the discharge summary, hospital problems, inpatient lab results, inpatient diagnostic studies, and consultation reports with Halie.     Current outpatient and discharge medications have been reconciled for the patient.  Reviewed by: Napoleon Mead MD      Date of TCM Phone Call 9/6/2022   [facility]   Date of Admission 9/1/2022   Date of Discharge 9/6/2022   Discharge Disposition Home or Self Care     Risk for Readmission (LACE) Score: 14 (9/6/2022  6:01 AM)      History of Present Illness   Course During Hospital Stay:      Date of Admission: 9/1/2022  Date of Discharge:  9/6/2022  Primary Care Physician: Napoleon Mead MD      Discharge Diagnosis:        Active Hospital Problems     Diagnosis   POA   • **Accelerated hypertension [I10]   Yes   • Acute cystitis without hematuria [N30.00]   Yes   • Anxiety disorder [F41.9]   Yes   • Seizure (HCC) [R56.9]   Yes   • Type 2 diabetes mellitus, with long-term current use of insulin (HCC) [E11.9, Z79.4]   Not Applicable   • Primary hypothyroidism [E03.9]   Yes   • Hyperlipidemia [E78.5]   Yes   • Benign essential hypertension [I10]   Yes   • Stage 3a chronic kidney disease (HCC) [N18.31]   Yes       Resolved Hospital Problems   No resolved problems to display.         Presenting Problem/History of Present Illness:  Dehydration [E86.0]  Accelerated hypertension [I10]  Hyperglycemia [R73.9]  Tinnitus of both ears [H93.13]                 Hospital Course:  The patient is a 82 y.o. female with a history of HTN, HLD, Type 2 DM, and CKD Stage 3a who presented with elevated blood pressures of greater than 200mmHg. Please see  "admission H&P for further details. She was initially started on a cardene drip and her oral medications were adjusted. She did not have evidence of end-organ damage. She was weaned off of the cardene drip and her blood pressures have remained stable though they are still elevated above optimal range. She will be discharged on the regimen below and keep close PCP follow up. Further adjustments can be made as an outpatient. She was followed by nephrology and she should keep scheduled follow up with them as well.   Of note she developed dysuria during the hospitalization and urinalysis was consistent with acute cystitis. She was started on a short course of cefdinir and this will be finished as an outpatient.     Current outpatient and discharge medications have been reconciled for the patient.  Reviewed by: Napoleon Mead MD             The following portions of the patient's history were reviewed and updated as appropriate: allergies, current medications, past family history, past medical history, past social history, past surgical history and problem list.    Review of Systems   Constitutional: Negative for activity change, chills and fever.   Respiratory: Negative for cough.    Cardiovascular: Negative for chest pain.   Psychiatric/Behavioral: Negative for dysphoric mood.       /70   Pulse 78   Temp 97.3 °F (36.3 °C) (Oral)   Resp 16   Ht 172.7 cm (68\")   Wt 86.6 kg (191 lb)   BMI 29.04 kg/m²     Objective   Physical Exam  Constitutional:       General: She is not in acute distress.     Appearance: She is well-developed.   Cardiovascular:      Rate and Rhythm: Normal rate and regular rhythm.   Pulmonary:      Effort: Pulmonary effort is normal.      Breath sounds: Normal breath sounds.   Neurological:      Mental Status: She is alert and oriented to person, place, and time.   Psychiatric:         Behavior: Behavior normal.         Thought Content: Thought content normal.     Hospital records reviewed " with pt confirming HPI.    Assessment & Plan   Diagnoses and all orders for this visit:    1. Accelerated hypertension (Primary)  -     amLODIPine (NORVASC) 10 MG tablet; Take 1 tablet by mouth Daily.  Dispense: 90 tablet; Refill: 1  -     bisoprolol (ZEBeta) 5 MG tablet; Take 1 tablet by mouth Daily.  Dispense: 90 tablet; Refill: 1  -     chlorthalidone (HYGROTON) 25 MG tablet; Take 1 tablet by mouth Daily.  Dispense: 90 tablet; Refill: 1  -     lisinopril (PRINIVIL,ZESTRIL) 40 MG tablet; Take 1 tablet by mouth Daily.  Dispense: 90 tablet; Refill: 1    2. Stage 3a chronic kidney disease (HCC)    3. Acute UTI (urinary tract infection)    4. Hospital discharge follow-up    Pt to keep Nephrology scheduled appt.

## 2022-09-13 ENCOUNTER — TELEPHONE (OUTPATIENT)
Dept: FAMILY MEDICINE CLINIC | Facility: CLINIC | Age: 82
End: 2022-09-13

## 2022-09-13 ENCOUNTER — OFFICE VISIT (OUTPATIENT)
Dept: FAMILY MEDICINE CLINIC | Facility: CLINIC | Age: 82
End: 2022-09-13

## 2022-09-13 VITALS
DIASTOLIC BLOOD PRESSURE: 70 MMHG | SYSTOLIC BLOOD PRESSURE: 151 MMHG | TEMPERATURE: 97.3 F | HEART RATE: 78 BPM | HEIGHT: 68 IN | RESPIRATION RATE: 16 BRPM | BODY MASS INDEX: 28.95 KG/M2 | WEIGHT: 191 LBS

## 2022-09-13 DIAGNOSIS — N39.0 ACUTE UTI (URINARY TRACT INFECTION): ICD-10-CM

## 2022-09-13 DIAGNOSIS — I10 ACCELERATED HYPERTENSION: Primary | ICD-10-CM

## 2022-09-13 DIAGNOSIS — N18.31 STAGE 3A CHRONIC KIDNEY DISEASE: ICD-10-CM

## 2022-09-13 DIAGNOSIS — Z09 HOSPITAL DISCHARGE FOLLOW-UP: ICD-10-CM

## 2022-09-13 PROCEDURE — 99495 TRANSJ CARE MGMT MOD F2F 14D: CPT | Performed by: FAMILY MEDICINE

## 2022-09-13 RX ORDER — LISINOPRIL 40 MG/1
40 TABLET ORAL
Qty: 90 TABLET | Refills: 1 | Status: SHIPPED | OUTPATIENT
Start: 2022-09-13 | End: 2023-02-22

## 2022-09-13 RX ORDER — AMLODIPINE BESYLATE 10 MG/1
10 TABLET ORAL
Qty: 90 TABLET | Refills: 1 | Status: SHIPPED | OUTPATIENT
Start: 2022-09-13 | End: 2022-12-02 | Stop reason: DRUGHIGH

## 2022-09-13 RX ORDER — CHLORTHALIDONE 25 MG/1
25 TABLET ORAL DAILY
Qty: 90 TABLET | Refills: 1 | Status: SHIPPED | OUTPATIENT
Start: 2022-09-13 | End: 2023-03-01 | Stop reason: SDUPTHER

## 2022-09-13 RX ORDER — BISOPROLOL FUMARATE 5 MG/1
5 TABLET, FILM COATED ORAL DAILY
Qty: 90 TABLET | Refills: 1 | Status: SHIPPED | OUTPATIENT
Start: 2022-09-13 | End: 2023-03-01 | Stop reason: SDUPTHER

## 2022-09-13 NOTE — TELEPHONE ENCOUNTER
Caller: ERASTO NIETO    Relationship: Home Health     Best call back number:[phone]    What orders are you requesting (i.e. lab or imaging): VERBAL FOR SKILLED NURSING FOR 1 TIME A WEEK FOR 6 WEEKS    In what timeframe would the patient need to come in: ASAP    Additional notes: PLEASE ADVISE

## 2022-09-16 ENCOUNTER — READMISSION MANAGEMENT (OUTPATIENT)
Dept: CALL CENTER | Facility: HOSPITAL | Age: 82
End: 2022-09-16

## 2022-09-16 NOTE — OUTREACH NOTE
Medical Week 2 Survey    Flowsheet Row Responses   [facility] patient discharged from? [place]   Does the patient have one of the following disease processes/diagnoses(primary or secondary)? Other   Week 2 attempt successful? Yes   Call start time 1102   Call end time 1106   Person spoke with today (if not patient) and relationship Son-Derrick.   Comments regarding appointments Ghanshyam aguillon has kept 2 appts this week-was evaluated by her PCP.   Does the patient have a primary care provider?  Yes   Has the patient kept scheduled appointments due by today? Yes   What is the patient's perception of their health status since discharge? Same   Week 2 Call Completed? Yes   Wrap up additional comments Brief call with sonDerrick. States patient is still c/o leg pain, and has been evaluated by her PCP. Denies any needs today.          ADITI ALMENDAREZ - Registered Nurse

## 2022-09-20 RX ORDER — DULAGLUTIDE 1.5 MG/.5ML
1.5 INJECTION, SOLUTION SUBCUTANEOUS WEEKLY
Qty: 6 ML | Refills: 3 | Status: SHIPPED | OUTPATIENT
Start: 2022-09-20

## 2022-09-27 ENCOUNTER — READMISSION MANAGEMENT (OUTPATIENT)
Dept: CALL CENTER | Facility: HOSPITAL | Age: 82
End: 2022-09-27

## 2022-09-27 NOTE — OUTREACH NOTE
Medical Week 3 Survey    Flowsheet Row Responses   [facility] [place] patient discharged from? [place]   Does the patient have one of the following disease processes/diagnoses(primary or secondary)? Other   Week 3 attempt successful? No   Unsuccessful attempts Attempt 1          AL Rm Registered Nurse

## 2022-09-29 ENCOUNTER — READMISSION MANAGEMENT (OUTPATIENT)
Dept: CALL CENTER | Facility: HOSPITAL | Age: 82
End: 2022-09-29

## 2022-09-29 NOTE — OUTREACH NOTE
"Medical Week 3 Survey    Flowsheet Row Responses   [facility] patient discharged from? [place]   Does the patient have one of the following disease processes/diagnoses(primary or secondary)? Other   Week 3 attempt successful? Yes   Call start time 0804   Call end time 0814   Discharge diagnosis Hypertensive Urgency   Meds reviewed with patient/caregiver? Yes   Is the patient having any side effects they believe may be caused by any medication additions or changes? No   Does the patient have all medications ordered at discharge? Yes   Is the patient taking all medications as directed (includes completed medication regime)? Yes   Medication comments ATBs completed and reports BP medication changes have been effective   Comments regarding appointments [facility] 9/14/22   Does the patient have a primary care provider?  Yes   Comments regarding PCP F/u completed with PCP 9/13/22   Does the patient have an appointment with their PCP within 7 days of discharge? Yes   Has the patient kept scheduled appointments due by today? Yes   What is the Home health agency?   [facility]  active with care and to visit today   Has home health visited the patient within 72 hours of discharge? Yes   Home health comments Receiving PT and Nursing services   DME comments Ensured patient is using walker at all times    Psychosocial issues? No   What is the patient's perception of their health status since discharge? New symptoms unrelated to diagnosis  [Pt reports UTI and HTN issues resolved but still having chronic issues r/t leg left since her fall this past June, had simple XY at that time. She reports increased pain w/ambulation,no areas of warmth but endorse \"spots of redness\" at times,no edema.]   Is the patient/caregiver able to teach back signs and symptoms related to disease process for when to call PCP? Yes   Is the patient/caregiver able to teach back signs and symptoms related to disease process for when to call 911? Yes "   Is the patient/caregiver able to teach back the hierarchy of who to call/visit for symptoms/problems? PCP, Specialist, Home health nurse, Urgent Care, ED, 911 Yes   Additional teach back comments She reports she has communicated her concerns to PCP at her f/u a few weeks ago but her issues have continued r/t to her leg--encouraged her to call her PCP to report her ongoing complaints, v/u and will also discuss with her HH team today.   Week 3 Call Completed? Yes          MIGDALIA KILLIAN - Registered Nurse

## 2022-10-04 ENCOUNTER — TELEPHONE (OUTPATIENT)
Dept: FAMILY MEDICINE CLINIC | Facility: CLINIC | Age: 82
End: 2022-10-04

## 2022-10-04 NOTE — TELEPHONE ENCOUNTER
Caller: MARY MAURER     Relationship: HOME HEALTH     Best call back number:   ALLY/ ERAST[facility]  [phone]     What was the call regarding:   CARE-TENDERS CALLED TO GET ADDITIONAL ORDERS FOR PATIENT WHO IS HAVING SOME SKIN ISSUES     SHE IS NEEDING NURSING ORDERS  TO BE ADDED TO CURRENT HOME HEALTH PHYSICAL THERAPY SCHEDULE /REGIMINE     SHE I ALSO RECOMMENDSTHE PATIENT TO SEE A PODIATRIST TO ADDRESS :  FALLING ARCHES  SWELLING LEG  YEAST ON TOES  STRAIN ON TIBIA  MAKING IT DIFFICULT TO WALK     Do you require a callback:YES PLEASE

## 2022-10-12 ENCOUNTER — READMISSION MANAGEMENT (OUTPATIENT)
Dept: CALL CENTER | Facility: HOSPITAL | Age: 82
End: 2022-10-12

## 2022-10-12 NOTE — OUTREACH NOTE
Medical Week 4 Survey    Flowsheet Row Responses   [facility] patient discharged from? [place]   Does the patient have one of the following disease processes/diagnoses(primary or secondary)? Other   Week 4 attempt successful? No          EDMUND BREWSTER - Registered Nurse

## 2022-10-19 ENCOUNTER — TELEPHONE (OUTPATIENT)
Dept: ENDOCRINOLOGY | Age: 82
End: 2022-10-19

## 2022-10-19 NOTE — TELEPHONE ENCOUNTER
pts blood sugar was 300 this morning and that's unusual for her. She wants to know if she can get another meter.    Please call her back at [phone]

## 2022-10-24 DIAGNOSIS — G93.41 METABOLIC ENCEPHALOPATHY: ICD-10-CM

## 2022-10-24 RX ORDER — PREGABALIN 50 MG/1
50 CAPSULE ORAL 3 TIMES DAILY
Qty: 90 CAPSULE | Refills: 1 | OUTPATIENT
Start: 2022-10-24

## 2022-10-24 NOTE — TELEPHONE ENCOUNTER
.    Caller: YunielbrieAlisia hernandezgopal LIMA    Relationship: Self    Best call back number: [phone]     Requested Prescriptions:   Requested Prescriptions     Pending Prescriptions Disp Refills   • pregabalin (LYRICA) 50 MG capsule 90 capsule 1     Sig: Take 1 capsule by mouth 3 (Three) Times a Day.        Pharmacy where request should be sent: [id number] [address] [place] [address]  AT [facility] [phone] [facility] [phone]      Additional details provided by patient: OUT OF MEDICATION, TABATHA WOULD LIKE TO KNOW IF SHE CAN HAVE AN EMERGENCY ORDER SENT TO LOCAL PHARMACY     Does the patient have less than a 3 day supply:  [x] Yes  [] No    Mario Medina   10/24/22 14:47 EDT

## 2022-10-25 ENCOUNTER — TELEPHONE (OUTPATIENT)
Dept: FAMILY MEDICINE CLINIC | Facility: CLINIC | Age: 82
End: 2022-10-25

## 2022-10-25 RX ORDER — PREGABALIN 50 MG/1
CAPSULE ORAL
Qty: 42 CAPSULE | OUTPATIENT
Start: 2022-10-25

## 2022-10-25 NOTE — TELEPHONE ENCOUNTER
Caller: ERASTO    Relationship: EMILIA  Best call back number: [phone]    What medication are you requesting: KERI    Have you had these symptoms before:    [x] Yes  [] No    Have you been treated for these symptoms before:   [x] Yes  [] No    If a prescription is needed, what is your preferred pharmacy and phone number: [facility] #[id number] [address] AT [facility] [phone] [facility] [phone]      Additional notes: EMILIA WITH [facility] STATES THE PATIENT IS REQUESTING A 7 DAY SUPPLY OF THIS MEDICATION. IT WASN'T ON HER MEDICATION LIST, SO IM HAVING TO REQUEST A NEW MEDICATION. SHE A PRESCRIPTION COMING FROM [facility], SHE IS JUST NEEDING MEDICATION TO LAST HER UNTIL IT ARRIVES.

## 2022-10-27 ENCOUNTER — TELEPHONE (OUTPATIENT)
Dept: FAMILY MEDICINE CLINIC | Facility: CLINIC | Age: 82
End: 2022-10-27

## 2022-10-27 DIAGNOSIS — G93.41 METABOLIC ENCEPHALOPATHY: ICD-10-CM

## 2022-10-27 RX ORDER — PREGABALIN 50 MG/1
50 CAPSULE ORAL 3 TIMES DAILY
Qty: 21 CAPSULE | Refills: 1 | Status: ON HOLD | OUTPATIENT
Start: 2022-10-27 | End: 2022-11-11 | Stop reason: SDUPTHER

## 2022-10-27 NOTE — TELEPHONE ENCOUNTER
Pt has been out of her lyrica for 5 days. She is having nausea and side effects from not being on it. You wrote it for her on 6/2/22.  They are wanting to know if you will refill this.

## 2022-11-02 ENCOUNTER — TELEPHONE (OUTPATIENT)
Dept: CASE MANAGEMENT | Facility: OTHER | Age: 82
End: 2022-11-02

## 2022-11-03 ENCOUNTER — APPOINTMENT (OUTPATIENT)
Dept: GENERAL RADIOLOGY | Facility: HOSPITAL | Age: 82
End: 2022-11-03

## 2022-11-03 ENCOUNTER — HOSPITAL ENCOUNTER (INPATIENT)
Facility: HOSPITAL | Age: 82
LOS: 7 days | Discharge: NURSING, REHAB OR LONG-TERM CARE | End: 2022-11-11
Attending: EMERGENCY MEDICINE | Admitting: STUDENT IN AN ORGANIZED HEALTH CARE EDUCATION/TRAINING PROGRAM

## 2022-11-03 DIAGNOSIS — E11.59 TYPE 2 DIABETES MELLITUS WITH OTHER CIRCULATORY COMPLICATION, WITH LONG-TERM CURRENT USE OF INSULIN: ICD-10-CM

## 2022-11-03 DIAGNOSIS — M51.36 LUMBAR DEGENERATIVE DISC DISEASE: ICD-10-CM

## 2022-11-03 DIAGNOSIS — G93.41 METABOLIC ENCEPHALOPATHY: ICD-10-CM

## 2022-11-03 DIAGNOSIS — R73.9 HYPERGLYCEMIA: ICD-10-CM

## 2022-11-03 DIAGNOSIS — J18.9 PNEUMONIA OF LEFT LOWER LOBE DUE TO INFECTIOUS ORGANISM: ICD-10-CM

## 2022-11-03 DIAGNOSIS — R41.82 ALTERED MENTAL STATUS, UNSPECIFIED ALTERED MENTAL STATUS TYPE: Primary | ICD-10-CM

## 2022-11-03 DIAGNOSIS — Z79.4 TYPE 2 DIABETES MELLITUS WITH OTHER CIRCULATORY COMPLICATION, WITH LONG-TERM CURRENT USE OF INSULIN: ICD-10-CM

## 2022-11-03 LAB
AMMONIA BLD-SCNC: 26 UMOL/L (ref 11–51)
APTT PPP: 34.7 SECONDS (ref 22.7–35.4)
ARTERIAL PATENCY WRIST A: POSITIVE
ATMOSPHERIC PRESS: 753.8 MMHG
BASE EXCESS BLDA CALC-SCNC: -3.9 MMOL/L (ref 0–2)
BASOPHILS # BLD AUTO: 0.09 10*3/MM3 (ref 0–0.2)
BASOPHILS NFR BLD AUTO: 0.8 % (ref 0–1.5)
BDY SITE: ABNORMAL
DEPRECATED RDW RBC AUTO: 46.6 FL (ref 37–54)
EOSINOPHIL # BLD AUTO: 0.26 10*3/MM3 (ref 0–0.4)
EOSINOPHIL NFR BLD AUTO: 2.2 % (ref 0.3–6.2)
ERYTHROCYTE [DISTWIDTH] IN BLOOD BY AUTOMATED COUNT: 14.8 % (ref 12.3–15.4)
GLUCOSE BLDC GLUCOMTR-MCNC: 302 MG/DL (ref 70–130)
HCO3 BLDA-SCNC: 21.8 MMOL/L (ref 22–28)
HCT VFR BLD AUTO: 37.2 % (ref 34–46.6)
HGB BLD-MCNC: 12.6 G/DL (ref 12–15.9)
IMM GRANULOCYTES # BLD AUTO: 0.08 10*3/MM3 (ref 0–0.05)
IMM GRANULOCYTES NFR BLD AUTO: 0.7 % (ref 0–0.5)
INHALED O2 CONCENTRATION: 21 %
INR PPP: 1.03 (ref 0.9–1.1)
LYMPHOCYTES # BLD AUTO: 1.25 10*3/MM3 (ref 0.7–3.1)
LYMPHOCYTES NFR BLD AUTO: 10.6 % (ref 19.6–45.3)
MCH RBC QN AUTO: 29.5 PG (ref 26.6–33)
MCHC RBC AUTO-ENTMCNC: 33.9 G/DL (ref 31.5–35.7)
MCV RBC AUTO: 87.1 FL (ref 79–97)
MODALITY: ABNORMAL
MONOCYTES # BLD AUTO: 0.69 10*3/MM3 (ref 0.1–0.9)
MONOCYTES NFR BLD AUTO: 5.8 % (ref 5–12)
NEUTROPHILS NFR BLD AUTO: 79.9 % (ref 42.7–76)
NEUTROPHILS NFR BLD AUTO: 9.44 10*3/MM3 (ref 1.7–7)
NRBC BLD AUTO-RTO: 0 /100 WBC (ref 0–0.2)
O2 A-A PPRESDIFF RESPIRATORY: 0.7 MMHG
PCO2 BLDA: 41 MM HG (ref 35–45)
PH BLDA: 7.33 PH UNITS (ref 7.35–7.45)
PLATELET # BLD AUTO: 355 10*3/MM3 (ref 140–450)
PMV BLD AUTO: 9.4 FL (ref 6–12)
PO2 BLDA: 71.3 MM HG (ref 80–100)
PROTHROMBIN TIME: 13.6 SECONDS (ref 11.7–14.2)
RBC # BLD AUTO: 4.27 10*6/MM3 (ref 3.77–5.28)
SAO2 % BLDCOA: 93 % (ref 92–99)
TOTAL RATE: 18 BREATHS/MINUTE
WBC NRBC COR # BLD: 11.81 10*3/MM3 (ref 3.4–10.8)

## 2022-11-03 PROCEDURE — 36600 WITHDRAWAL OF ARTERIAL BLOOD: CPT

## 2022-11-03 PROCEDURE — 85025 COMPLETE CBC W/AUTO DIFF WBC: CPT | Performed by: EMERGENCY MEDICINE

## 2022-11-03 PROCEDURE — 93010 ELECTROCARDIOGRAM REPORT: CPT | Performed by: INTERNAL MEDICINE

## 2022-11-03 PROCEDURE — 93005 ELECTROCARDIOGRAM TRACING: CPT | Performed by: EMERGENCY MEDICINE

## 2022-11-03 PROCEDURE — 99285 EMERGENCY DEPT VISIT HI MDM: CPT

## 2022-11-03 PROCEDURE — 84484 ASSAY OF TROPONIN QUANT: CPT | Performed by: EMERGENCY MEDICINE

## 2022-11-03 PROCEDURE — 83605 ASSAY OF LACTIC ACID: CPT | Performed by: EMERGENCY MEDICINE

## 2022-11-03 PROCEDURE — 82803 BLOOD GASES ANY COMBINATION: CPT

## 2022-11-03 PROCEDURE — 83735 ASSAY OF MAGNESIUM: CPT | Performed by: EMERGENCY MEDICINE

## 2022-11-03 PROCEDURE — 71045 X-RAY EXAM CHEST 1 VIEW: CPT

## 2022-11-03 PROCEDURE — 82962 GLUCOSE BLOOD TEST: CPT

## 2022-11-03 PROCEDURE — 85730 THROMBOPLASTIN TIME PARTIAL: CPT | Performed by: EMERGENCY MEDICINE

## 2022-11-03 PROCEDURE — 80053 COMPREHEN METABOLIC PANEL: CPT | Performed by: EMERGENCY MEDICINE

## 2022-11-03 PROCEDURE — 0202U NFCT DS 22 TRGT SARS-COV-2: CPT | Performed by: EMERGENCY MEDICINE

## 2022-11-03 PROCEDURE — 84145 PROCALCITONIN (PCT): CPT | Performed by: EMERGENCY MEDICINE

## 2022-11-03 PROCEDURE — 82140 ASSAY OF AMMONIA: CPT | Performed by: EMERGENCY MEDICINE

## 2022-11-03 PROCEDURE — 85610 PROTHROMBIN TIME: CPT | Performed by: EMERGENCY MEDICINE

## 2022-11-03 PROCEDURE — 82010 KETONE BODYS QUAN: CPT | Performed by: EMERGENCY MEDICINE

## 2022-11-03 RX ORDER — SODIUM CHLORIDE 0.9 % (FLUSH) 0.9 %
10 SYRINGE (ML) INJECTION AS NEEDED
Status: DISCONTINUED | OUTPATIENT
Start: 2022-11-03 | End: 2022-11-11 | Stop reason: HOSPADM

## 2022-11-04 ENCOUNTER — APPOINTMENT (OUTPATIENT)
Dept: CT IMAGING | Facility: HOSPITAL | Age: 82
End: 2022-11-04

## 2022-11-04 PROBLEM — J18.9 LEFT LOWER LOBE PNEUMONIA: Status: ACTIVE | Noted: 2022-11-04

## 2022-11-04 PROBLEM — R19.7 DIARRHEA: Status: ACTIVE | Noted: 2022-11-04

## 2022-11-04 PROBLEM — R41.82 ALTERED MENTAL STATUS, UNSPECIFIED ALTERED MENTAL STATUS TYPE: Status: ACTIVE | Noted: 2022-11-04

## 2022-11-04 LAB
ALBUMIN SERPL-MCNC: 4.2 G/DL (ref 3.5–5.2)
ALBUMIN/GLOB SERPL: 1.3 G/DL
ALP SERPL-CCNC: 110 U/L (ref 39–117)
ALT SERPL W P-5'-P-CCNC: 10 U/L (ref 1–33)
ANION GAP SERPL CALCULATED.3IONS-SCNC: 11.3 MMOL/L (ref 5–15)
ANION GAP SERPL CALCULATED.3IONS-SCNC: 15.8 MMOL/L (ref 5–15)
AST SERPL-CCNC: 13 U/L (ref 1–32)
B PARAPERT DNA SPEC QL NAA+PROBE: NOT DETECTED
B PERT DNA SPEC QL NAA+PROBE: NOT DETECTED
B-OH-BUTYR SERPL-SCNC: 0.24 MMOL/L (ref 0.02–0.27)
BASOPHILS # BLD AUTO: 0.09 10*3/MM3 (ref 0–0.2)
BASOPHILS NFR BLD AUTO: 0.9 % (ref 0–1.5)
BILIRUB SERPL-MCNC: 0.3 MG/DL (ref 0–1.2)
BUN SERPL-MCNC: 67 MG/DL (ref 8–23)
BUN SERPL-MCNC: 77 MG/DL (ref 8–23)
BUN/CREAT SERPL: 45.6 (ref 7–25)
BUN/CREAT SERPL: 50.8 (ref 7–25)
C PNEUM DNA NPH QL NAA+NON-PROBE: NOT DETECTED
CALCIUM SPEC-SCNC: 8.9 MG/DL (ref 8.6–10.5)
CALCIUM SPEC-SCNC: 9.1 MG/DL (ref 8.6–10.5)
CHLORIDE SERPL-SCNC: 103 MMOL/L (ref 98–107)
CHLORIDE SERPL-SCNC: 105 MMOL/L (ref 98–107)
CO2 SERPL-SCNC: 17.2 MMOL/L (ref 22–29)
CO2 SERPL-SCNC: 20.7 MMOL/L (ref 22–29)
CREAT SERPL-MCNC: 1.32 MG/DL (ref 0.57–1)
CREAT SERPL-MCNC: 1.69 MG/DL (ref 0.57–1)
D-LACTATE SERPL-SCNC: 1.8 MMOL/L (ref 0.5–2)
DEPRECATED RDW RBC AUTO: 46.3 FL (ref 37–54)
EGFRCR SERPLBLD CKD-EPI 2021: 30 ML/MIN/1.73
EGFRCR SERPLBLD CKD-EPI 2021: 40.4 ML/MIN/1.73
EOSINOPHIL # BLD AUTO: 0.38 10*3/MM3 (ref 0–0.4)
EOSINOPHIL NFR BLD AUTO: 3.8 % (ref 0.3–6.2)
ERYTHROCYTE [DISTWIDTH] IN BLOOD BY AUTOMATED COUNT: 14.5 % (ref 12.3–15.4)
FLUAV SUBTYP SPEC NAA+PROBE: NOT DETECTED
FLUBV RNA ISLT QL NAA+PROBE: NOT DETECTED
GLOBULIN UR ELPH-MCNC: 3.2 GM/DL
GLUCOSE BLDC GLUCOMTR-MCNC: 176 MG/DL (ref 70–130)
GLUCOSE BLDC GLUCOMTR-MCNC: 198 MG/DL (ref 70–130)
GLUCOSE BLDC GLUCOMTR-MCNC: 204 MG/DL (ref 70–130)
GLUCOSE BLDC GLUCOMTR-MCNC: 275 MG/DL (ref 70–130)
GLUCOSE BLDC GLUCOMTR-MCNC: 295 MG/DL (ref 70–130)
GLUCOSE BLDC GLUCOMTR-MCNC: 377 MG/DL (ref 70–130)
GLUCOSE SERPL-MCNC: 234 MG/DL (ref 65–99)
GLUCOSE SERPL-MCNC: 341 MG/DL (ref 65–99)
HADV DNA SPEC NAA+PROBE: NOT DETECTED
HCOV 229E RNA SPEC QL NAA+PROBE: NOT DETECTED
HCOV HKU1 RNA SPEC QL NAA+PROBE: NOT DETECTED
HCOV NL63 RNA SPEC QL NAA+PROBE: NOT DETECTED
HCOV OC43 RNA SPEC QL NAA+PROBE: NOT DETECTED
HCT VFR BLD AUTO: 36.3 % (ref 34–46.6)
HGB BLD-MCNC: 12.2 G/DL (ref 12–15.9)
HMPV RNA NPH QL NAA+NON-PROBE: NOT DETECTED
HOLD SPECIMEN: NORMAL
HOLD SPECIMEN: NORMAL
HPIV1 RNA ISLT QL NAA+PROBE: NOT DETECTED
HPIV2 RNA SPEC QL NAA+PROBE: NOT DETECTED
HPIV3 RNA NPH QL NAA+PROBE: NOT DETECTED
HPIV4 P GENE NPH QL NAA+PROBE: NOT DETECTED
IMM GRANULOCYTES # BLD AUTO: 0.03 10*3/MM3 (ref 0–0.05)
IMM GRANULOCYTES NFR BLD AUTO: 0.3 % (ref 0–0.5)
L PNEUMO1 AG UR QL IA: NEGATIVE
LYMPHOCYTES # BLD AUTO: 1.55 10*3/MM3 (ref 0.7–3.1)
LYMPHOCYTES NFR BLD AUTO: 15.5 % (ref 19.6–45.3)
M PNEUMO IGG SER IA-ACNC: NOT DETECTED
MAGNESIUM SERPL-MCNC: 2.4 MG/DL (ref 1.6–2.4)
MCH RBC QN AUTO: 29 PG (ref 26.6–33)
MCHC RBC AUTO-ENTMCNC: 33.6 G/DL (ref 31.5–35.7)
MCV RBC AUTO: 86.2 FL (ref 79–97)
MONOCYTES # BLD AUTO: 1.04 10*3/MM3 (ref 0.1–0.9)
MONOCYTES NFR BLD AUTO: 10.4 % (ref 5–12)
MRSA DNA SPEC QL NAA+PROBE: NORMAL
NEUTROPHILS NFR BLD AUTO: 6.94 10*3/MM3 (ref 1.7–7)
NEUTROPHILS NFR BLD AUTO: 69.1 % (ref 42.7–76)
NRBC BLD AUTO-RTO: 0 /100 WBC (ref 0–0.2)
PLATELET # BLD AUTO: 286 10*3/MM3 (ref 140–450)
PMV BLD AUTO: 9.8 FL (ref 6–12)
POTASSIUM SERPL-SCNC: 4.4 MMOL/L (ref 3.5–5.2)
POTASSIUM SERPL-SCNC: 4.9 MMOL/L (ref 3.5–5.2)
PROCALCITONIN SERPL-MCNC: 0.06 NG/ML (ref 0–0.25)
PROT SERPL-MCNC: 7.4 G/DL (ref 6–8.5)
RBC # BLD AUTO: 4.21 10*6/MM3 (ref 3.77–5.28)
RHINOVIRUS RNA SPEC NAA+PROBE: NOT DETECTED
RSV RNA NPH QL NAA+NON-PROBE: NOT DETECTED
S PNEUM AG SPEC QL LA: NEGATIVE
SARS-COV-2 RNA NPH QL NAA+NON-PROBE: NOT DETECTED
SODIUM SERPL-SCNC: 135 MMOL/L (ref 136–145)
SODIUM SERPL-SCNC: 138 MMOL/L (ref 136–145)
TROPONIN T SERPL-MCNC: <0.01 NG/ML (ref 0–0.03)
WBC NRBC COR # BLD: 10.03 10*3/MM3 (ref 3.4–10.8)
WHOLE BLOOD HOLD COAG: NORMAL
WHOLE BLOOD HOLD SPECIMEN: NORMAL

## 2022-11-04 PROCEDURE — 87899 AGENT NOS ASSAY W/OPTIC: CPT | Performed by: NURSE PRACTITIONER

## 2022-11-04 PROCEDURE — 25010000002 ENOXAPARIN PER 10 MG: Performed by: NURSE PRACTITIONER

## 2022-11-04 PROCEDURE — 80048 BASIC METABOLIC PNL TOTAL CA: CPT | Performed by: NURSE PRACTITIONER

## 2022-11-04 PROCEDURE — 87040 BLOOD CULTURE FOR BACTERIA: CPT | Performed by: EMERGENCY MEDICINE

## 2022-11-04 PROCEDURE — 36415 COLL VENOUS BLD VENIPUNCTURE: CPT | Performed by: NURSE PRACTITIONER

## 2022-11-04 PROCEDURE — 63710000001 INSULIN LISPRO (HUMAN) PER 5 UNITS: Performed by: HOSPITALIST

## 2022-11-04 PROCEDURE — 63710000001 INSULIN REGULAR HUMAN PER 5 UNITS: Performed by: EMERGENCY MEDICINE

## 2022-11-04 PROCEDURE — 82962 GLUCOSE BLOOD TEST: CPT

## 2022-11-04 PROCEDURE — 87449 NOS EACH ORGANISM AG IA: CPT | Performed by: NURSE PRACTITIONER

## 2022-11-04 PROCEDURE — 63710000001 INSULIN LISPRO (HUMAN) PER 5 UNITS: Performed by: NURSE PRACTITIONER

## 2022-11-04 PROCEDURE — 87641 MR-STAPH DNA AMP PROBE: CPT | Performed by: NURSE PRACTITIONER

## 2022-11-04 PROCEDURE — 70450 CT HEAD/BRAIN W/O DYE: CPT

## 2022-11-04 PROCEDURE — 85025 COMPLETE CBC W/AUTO DIFF WBC: CPT | Performed by: NURSE PRACTITIONER

## 2022-11-04 PROCEDURE — 25010000002 CEFTRIAXONE PER 250 MG: Performed by: EMERGENCY MEDICINE

## 2022-11-04 PROCEDURE — 25010000002 AZITHROMYCIN PER 500 MG: Performed by: EMERGENCY MEDICINE

## 2022-11-04 RX ORDER — NICOTINE POLACRILEX 4 MG
15 LOZENGE BUCCAL
Status: DISCONTINUED | OUTPATIENT
Start: 2022-11-04 | End: 2022-11-11 | Stop reason: HOSPADM

## 2022-11-04 RX ORDER — HYDROCODONE BITARTRATE AND ACETAMINOPHEN 5; 325 MG/1; MG/1
1 TABLET ORAL 3 TIMES DAILY
Status: DISCONTINUED | OUTPATIENT
Start: 2022-11-04 | End: 2022-11-11 | Stop reason: HOSPADM

## 2022-11-04 RX ORDER — INSULIN LISPRO 100 [IU]/ML
0-7 INJECTION, SOLUTION INTRAVENOUS; SUBCUTANEOUS
Status: DISCONTINUED | OUTPATIENT
Start: 2022-11-04 | End: 2022-11-11 | Stop reason: HOSPADM

## 2022-11-04 RX ORDER — INSULIN LISPRO 100 [IU]/ML
3 INJECTION, SOLUTION INTRAVENOUS; SUBCUTANEOUS
Status: DISCONTINUED | OUTPATIENT
Start: 2022-11-04 | End: 2022-11-04

## 2022-11-04 RX ORDER — INSULIN LISPRO 100 [IU]/ML
0-14 INJECTION, SOLUTION INTRAVENOUS; SUBCUTANEOUS
Status: DISCONTINUED | OUTPATIENT
Start: 2022-11-04 | End: 2022-11-04

## 2022-11-04 RX ORDER — PANTOPRAZOLE SODIUM 40 MG/1
40 TABLET, DELAYED RELEASE ORAL EVERY MORNING
Status: DISCONTINUED | OUTPATIENT
Start: 2022-11-04 | End: 2022-11-11 | Stop reason: HOSPADM

## 2022-11-04 RX ORDER — LEVOTHYROXINE SODIUM 112 UG/1
112 TABLET ORAL DAILY
Status: DISCONTINUED | OUTPATIENT
Start: 2022-11-04 | End: 2022-11-11 | Stop reason: HOSPADM

## 2022-11-04 RX ORDER — SODIUM CHLORIDE 0.9 % (FLUSH) 0.9 %
10 SYRINGE (ML) INJECTION EVERY 12 HOURS SCHEDULED
Status: DISCONTINUED | OUTPATIENT
Start: 2022-11-04 | End: 2022-11-11 | Stop reason: HOSPADM

## 2022-11-04 RX ORDER — SODIUM CHLORIDE 0.9 % (FLUSH) 0.9 %
10 SYRINGE (ML) INJECTION AS NEEDED
Status: DISCONTINUED | OUTPATIENT
Start: 2022-11-04 | End: 2022-11-11 | Stop reason: HOSPADM

## 2022-11-04 RX ORDER — ATORVASTATIN CALCIUM 20 MG/1
80 TABLET, FILM COATED ORAL NIGHTLY
Status: DISCONTINUED | OUTPATIENT
Start: 2022-11-04 | End: 2022-11-11 | Stop reason: HOSPADM

## 2022-11-04 RX ORDER — SODIUM CHLORIDE, SODIUM LACTATE, POTASSIUM CHLORIDE, CALCIUM CHLORIDE 600; 310; 30; 20 MG/100ML; MG/100ML; MG/100ML; MG/100ML
100 INJECTION, SOLUTION INTRAVENOUS CONTINUOUS
Status: DISCONTINUED | OUTPATIENT
Start: 2022-11-04 | End: 2022-11-05

## 2022-11-04 RX ORDER — ENOXAPARIN SODIUM 100 MG/ML
40 INJECTION SUBCUTANEOUS DAILY
Status: DISCONTINUED | OUTPATIENT
Start: 2022-11-04 | End: 2022-11-11 | Stop reason: HOSPADM

## 2022-11-04 RX ORDER — BISOPROLOL FUMARATE 5 MG/1
5 TABLET, FILM COATED ORAL DAILY
Status: DISCONTINUED | OUTPATIENT
Start: 2022-11-04 | End: 2022-11-11 | Stop reason: HOSPADM

## 2022-11-04 RX ORDER — DULOXETIN HYDROCHLORIDE 30 MG/1
30 CAPSULE, DELAYED RELEASE ORAL DAILY
Status: DISCONTINUED | OUTPATIENT
Start: 2022-11-04 | End: 2022-11-11 | Stop reason: HOSPADM

## 2022-11-04 RX ORDER — ASPIRIN 81 MG/1
81 TABLET ORAL DAILY
Status: DISCONTINUED | OUTPATIENT
Start: 2022-11-04 | End: 2022-11-11 | Stop reason: HOSPADM

## 2022-11-04 RX ORDER — AMLODIPINE BESYLATE 10 MG/1
10 TABLET ORAL
Status: DISCONTINUED | OUTPATIENT
Start: 2022-11-04 | End: 2022-11-11 | Stop reason: HOSPADM

## 2022-11-04 RX ORDER — DEXTROSE MONOHYDRATE 25 G/50ML
25 INJECTION, SOLUTION INTRAVENOUS
Status: DISCONTINUED | OUTPATIENT
Start: 2022-11-04 | End: 2022-11-11 | Stop reason: HOSPADM

## 2022-11-04 RX ORDER — CHOLECALCIFEROL (VITAMIN D3) 125 MCG
1000 CAPSULE ORAL DAILY
Status: DISCONTINUED | OUTPATIENT
Start: 2022-11-04 | End: 2022-11-11 | Stop reason: HOSPADM

## 2022-11-04 RX ORDER — PREGABALIN 50 MG/1
50 CAPSULE ORAL 3 TIMES DAILY
Status: DISCONTINUED | OUTPATIENT
Start: 2022-11-04 | End: 2022-11-11 | Stop reason: HOSPADM

## 2022-11-04 RX ADMIN — LEVOTHYROXINE SODIUM 112 MCG: 0.11 TABLET ORAL at 11:33

## 2022-11-04 RX ADMIN — INSULIN GLARGINE-YFGN 30 UNITS: 100 INJECTION, SOLUTION SUBCUTANEOUS at 21:24

## 2022-11-04 RX ADMIN — INSULIN LISPRO 3 UNITS: 100 INJECTION, SOLUTION INTRAVENOUS; SUBCUTANEOUS at 06:49

## 2022-11-04 RX ADMIN — DULOXETINE HYDROCHLORIDE 30 MG: 30 CAPSULE, DELAYED RELEASE ORAL at 11:33

## 2022-11-04 RX ADMIN — INSULIN HUMAN 10 UNITS: 100 INJECTION, SOLUTION PARENTERAL at 01:21

## 2022-11-04 RX ADMIN — INSULIN LISPRO 4 UNITS: 100 INJECTION, SOLUTION INTRAVENOUS; SUBCUTANEOUS at 17:36

## 2022-11-04 RX ADMIN — Medication 10 ML: at 08:50

## 2022-11-04 RX ADMIN — ATORVASTATIN CALCIUM 80 MG: 20 TABLET, FILM COATED ORAL at 21:23

## 2022-11-04 RX ADMIN — AMLODIPINE BESYLATE 10 MG: 10 TABLET ORAL at 11:33

## 2022-11-04 RX ADMIN — PREGABALIN 50 MG: 50 CAPSULE ORAL at 11:32

## 2022-11-04 RX ADMIN — INSULIN LISPRO 2 UNITS: 100 INJECTION, SOLUTION INTRAVENOUS; SUBCUTANEOUS at 21:23

## 2022-11-04 RX ADMIN — INSULIN LISPRO 6 UNITS: 100 INJECTION, SOLUTION INTRAVENOUS; SUBCUTANEOUS at 12:09

## 2022-11-04 RX ADMIN — HYDROCODONE BITARTRATE AND ACETAMINOPHEN 1 TABLET: 5; 325 TABLET ORAL at 12:09

## 2022-11-04 RX ADMIN — BISOPROLOL FUMARATE 5 MG: 5 TABLET, FILM COATED ORAL at 11:33

## 2022-11-04 RX ADMIN — Medication 10 ML: at 23:06

## 2022-11-04 RX ADMIN — CEFTRIAXONE SODIUM 1 G: 1 INJECTION, POWDER, FOR SOLUTION INTRAMUSCULAR; INTRAVENOUS at 01:24

## 2022-11-04 RX ADMIN — Medication 1000 MCG: at 11:33

## 2022-11-04 RX ADMIN — HYDROCODONE BITARTRATE AND ACETAMINOPHEN 1 TABLET: 5; 325 TABLET ORAL at 21:23

## 2022-11-04 RX ADMIN — SODIUM CHLORIDE, POTASSIUM CHLORIDE, SODIUM LACTATE AND CALCIUM CHLORIDE 100 ML/HR: 600; 310; 30; 20 INJECTION, SOLUTION INTRAVENOUS at 11:33

## 2022-11-04 RX ADMIN — PREGABALIN 50 MG: 50 CAPSULE ORAL at 16:35

## 2022-11-04 RX ADMIN — ENOXAPARIN SODIUM 40 MG: 100 INJECTION SUBCUTANEOUS at 02:16

## 2022-11-04 RX ADMIN — PANTOPRAZOLE SODIUM 40 MG: 40 TABLET, DELAYED RELEASE ORAL at 11:33

## 2022-11-04 RX ADMIN — PREGABALIN 50 MG: 50 CAPSULE ORAL at 21:23

## 2022-11-04 RX ADMIN — ASPIRIN 81 MG: 81 TABLET, COATED ORAL at 11:33

## 2022-11-04 RX ADMIN — HYDROCODONE BITARTRATE AND ACETAMINOPHEN 1 TABLET: 5; 325 TABLET ORAL at 16:35

## 2022-11-04 RX ADMIN — AZITHROMYCIN 500 MG: 500 INJECTION, POWDER, LYOPHILIZED, FOR SOLUTION INTRAVENOUS at 02:14

## 2022-11-04 NOTE — ED NOTES
".Nursing report ED to floor  Halie Guidry  82 y.o.  female    HPI :   Chief Complaint   Patient presents with    Hyperglycemia    Altered Mental Status       Admitting doctor:   Kelechi Sanchez MD    Admitting diagnosis:   The primary encounter diagnosis was Altered mental status, unspecified altered mental status type. Diagnoses of Pneumonia of left lower lobe due to infectious organism and Hyperglycemia were also pertinent to this visit.    Code status:   Current Code Status       Date Active Code Status Order ID Comments User Context       11/4/2022 0213 CPR (Attempt to Resuscitate) [id number]  Yanet Bartlett, [facility]        Question Answer    Code Status (Patient has no pulse and is not breathing) CPR (Attempt to Resuscitate)    Medical Interventions (Patient has pulse or is breathing) Full Support                    Allergies:   Sulfa antibiotics    Isolation:   No active isolations    Intake and Output    Intake/Output Summary (Last 24 hours) at 11/4/2022 0240  Last data filed at 11/4/2022 0214  Gross per 24 hour   Intake 100 ml   Output --   Net 100 ml       Weight:   There were no vitals filed for this visit.    Most recent vitals:   Vitals:    11/03/22 2243   BP: 138/66   BP Location: Right arm   Patient Position: Sitting   Pulse: 72   Resp: 14   Temp: 97.7 °F (36.5 °C)   TempSrc: Oral   SpO2: 99%   Height: 172.7 cm (68\")       Active LDAs/IV Access:   Lines, Drains & Airways       Active LDAs       Name Placement date Placement time Site Days    Peripheral IV 11/03/22 2310 Left Antecubital 11/03/22 2310  Antecubital  less than 1                    Labs (abnormal labs have a star):   Labs Reviewed   COMPREHENSIVE METABOLIC PANEL - Abnormal; Notable for the following components:       Result Value    Glucose 341 (*)     BUN 77 (*)     Creatinine 1.69 (*)     Sodium 135 (*)     CO2 20.7 (*)     BUN/Creatinine Ratio 45.6 (*)     eGFR 30.0 (*)     All other components within normal limits    " Narrative:     GFR Normal >60  Chronic Kidney Disease <60  Kidney Failure <15    The GFR formula is only valid for adults with stable renal function between ages 18 and 70.   CBC WITH AUTO DIFFERENTIAL - Abnormal; Notable for the following components:    WBC 11.81 (*)     Neutrophil % 79.9 (*)     Lymphocyte % 10.6 (*)     Immature Grans % 0.7 (*)     Neutrophils, Absolute 9.44 (*)     Immature Grans, Absolute 0.08 (*)     All other components within normal limits   BLOOD GAS, ARTERIAL - Abnormal; Notable for the following components:    pH, Arterial 7.333 (*)     pO2, Arterial 71.3 (*)     HCO3, Arterial 21.8 (*)     Base Excess, Arterial -3.9 (*)     All other components within normal limits   POCT GLUCOSE FINGERSTICK - Abnormal; Notable for the following components:    Glucose 302 (*)     All other components within normal limits   POCT GLUCOSE FINGERSTICK - Abnormal; Notable for the following components:    Glucose 295 (*)     All other components within normal limits   POCT GLUCOSE FINGERSTICK - Abnormal; Notable for the following components:    Glucose 204 (*)     All other components within normal limits   RESPIRATORY PANEL PCR W/ COVID-19 (SARS-COV-2) NAY/JEANIE/GAYLE/PAD/COR/MAD/YANN IN-HOUSE, NP SWAB IN [facility], 3-4 HR TAT - Normal    Narrative:     In the setting of a positive respiratory panel with a viral infection PLUS a negative procalcitonin without other underlying concern for bacterial infection, consider observing off antibiotics or discontinuation of antibiotics and continue supportive care. If the respiratory panel is positive for atypical bacterial infection (Bordetella pertussis, Chlamydophila pneumoniae, or Mycoplasma pneumoniae), consider antibiotic de-escalation to target atypical bacterial infection.   PROCALCITONIN - Normal    Narrative:     As a Marker for Sepsis (Non-Neonates):    1. <0.5 ng/mL represents a low risk of severe sepsis and/or septic shock.  2. >2 ng/mL represents a high risk of  "severe sepsis and/or septic shock.    As a Marker for Lower Respiratory Tract Infections that require antibiotic therapy:    PCT on Admission    Antibiotic Therapy       6-12 Hrs later    >0.5                Strongly Recommended  >0.25 - <0.5        Recommended   0.1 - 0.25          Discouraged              Remeasure/reassess PCT  <0.1                Strongly Discouraged     Remeasure/reassess PCT    As 28 day mortality risk marker: \"Change in Procalcitonin Result\" (>80% or <=80%) if Day 0 (or Day 1) and Day 4 values are available. Refer to http://[email or web address]-pct-calculator.com    Change in PCT <=80%  A decrease of PCT levels below or equal to 80% defines a positive change in PCT test result representing a higher risk for 28-day all-cause mortality of patients diagnosed with severe sepsis for septic shock.    Change in PCT >80%  A decrease of PCT levels of more than 80% defines a negative change in PCT result representing a lower risk for 28-day all-cause mortality of patients diagnosed with severe sepsis or septic shock.      LACTIC ACID, PLASMA - Normal   TROPONIN (IN-HOUSE) - Normal    Narrative:     Troponin T Reference Range:  <= 0.03 ng/mL-   Negative for AMI  >0.03 ng/mL-     Abnormal for myocardial necrosis.  Clinicians would have to utilize clinical acumen, EKG, Troponin and serial changes to determine if it is an Acute Myocardial Infarction or myocardial injury due to an underlying chronic condition.       Results may be falsely decreased if patient taking Biotin.     AMMONIA - Normal   MAGNESIUM - Normal   PROTIME-INR - Normal   APTT - Normal   BETA HYDROXYBUTYRATE QUANTITATIVE - Normal    Narrative:     In the assessment of possible diabetic ketoacidosis, the test should be interpreted along with other clinical and laboratory findings.  A level greater than 1 mmol/L should require further evaluation and levels of more than 3 mmol/L require immediate medical review.   BLOOD CULTURE   BLOOD CULTURE "   RESPIRATORY CULTURE   STREP PNEUMO AG, URINE OR CSF   MRSA SCREEN, PCR   LEGIONELLA ANTIGEN, URINE   RAINBOW DRAW    Narrative:     The following orders were created for panel order [place] Draw.  Procedure                               Abnormality         Status                     ---------                               -----------         ------                     Green Top (Gel)[[id number]]                                  Final result               Lavender Top[[id number]]                                     Final result               Gold Top - SST[[id number]]                                   Final result               Light Blue Top[[id number]]                                   Final result                 Please view results for these tests on the individual orders.   BLOOD GAS, ARTERIAL   BASIC METABOLIC PANEL   BASIC METABOLIC PANEL   CBC WITH AUTO DIFFERENTIAL   CBC WITH AUTO DIFFERENTIAL   POCT GLUCOSE FINGERSTICK   POCT GLUCOSE FINGERSTICK   POCT GLUCOSE FINGERSTICK   GREEN TOP   LAVENDER TOP   GOLD TOP - SST   LIGHT BLUE TOP   CBC AND DIFFERENTIAL    Narrative:     The following orders were created for panel order CBC & Differential.  Procedure                               Abnormality         Status                     ---------                               -----------         ------                     CBC Auto Differential[[id number]]        Abnormal            Final result                 Please view results for these tests on the individual orders.   KETONE BODIES SERUM    Narrative:     The following orders were created for panel order Ketone Bodies, Serum (Not performed at [facility]).  Procedure                               Abnormality         Status                     ---------                               -----------         ------                     Beta Hydroxybutyrate Campbell...[[id number]]  Normal              Final result                 Please view results for these tests on the individual  orders.   CBC AND DIFFERENTIAL    Narrative:     The following orders were created for panel order CBC & Differential.  Procedure                               Abnormality         Status                     ---------                               -----------         ------                     CBC Auto Differential[[id number]]                                                         Please view results for these tests on the individual orders.   CBC AND DIFFERENTIAL    Narrative:     The following orders were created for panel order CBC & Differential.  Procedure                               Abnormality         Status                     ---------                               -----------         ------                     CBC Auto Differential[[id number]]                                                         Please view results for these tests on the individual orders.       EKG:   ECG 12 Lead Altered Mental Status   Preliminary Result   HEART RATE= 76  bpm   RR Interval= 789  ms   VA Interval= 199  ms   P Horizontal Axis= -73  deg   P Front Axis= 33  deg   QRSD Interval= 156  ms   QT Interval= 430  ms   QRS Axis= 31  deg   T Wave Axis= 11  deg   - ABNORMAL ECG -   Sinus rhythm   IVCD, consider atypical RBBB   Electronically Signed By:    Date and Time of Study: 2022-11-03 23:32:57          Meds given in ED:   Medications   sodium chloride 0.9 % flush 10 mL (has no administration in time range)   sodium chloride 0.9 % flush 10 mL (has no administration in time range)   azithromycin (ZITHROMAX) 500 mg in sodium chloride 0.9 % 250 mL IVPB-VTB (500 mg Intravenous Given 11/4/22 0214)   cefTRIAXone (ROCEPHIN) 1 g in sodium chloride 0.9 % 100 mL IVPB-VTB (0 g Intravenous Hold 11/4/22 0215)   sodium chloride 0.9 % flush 10 mL (has no administration in time range)   sodium chloride 0.9 % flush 10 mL (has no administration in time range)   dextrose (GLUTOSE) oral gel 15 g (has no administration in time range)   dextrose  (D50W) (25 g/50 mL) IV injection 25 g (has no administration in time range)   glucagon (human recombinant) (GLUCAGEN DIAGNOSTIC) injection 1 mg (has no administration in time range)   Enoxaparin Sodium (LOVENOX) syringe 40 mg (40 mg Subcutaneous Given 22)   insulin lispro (ADMELOG) injection 0-14 Units (has no administration in time range)   cefTRIAXone (ROCEPHIN) 1 g in sodium chloride 0.9 % 100 mL IVPB-VTB (0 g Intravenous Stopped 22)   insulin regular (humuLIN R,novoLIN R) injection 10 Units (10 Units Intravenous Given 22)       Imaging results:  CT Head Without Contrast    Result Date: 2022  No acute intracranial findings.  Radiation dose reduction techniques were utilized, including automated exposure control and exposure modulation based on body size.  This report was finalized on 2022 1:39 AM by Dr. Kristina Forman M.D.      XR Chest 1 View    Result Date: 2022  Left basilar consolidation.  This report was finalized on 2022 12:10 AM by Dr. Kristina Forman M.D.       Ambulatory status:   -       Social issues:   Social History     Socioeconomic History    Marital status:    Tobacco Use    Smoking status: Former     Packs/day: 1.00     Types: Cigarettes     Quit date: 2013     Years since quittin.8    Smokeless tobacco: Never   Vaping Use    Vaping Use: Never used   Substance and Sexual Activity    Alcohol use: No    Drug use: No    Sexual activity: Alethea Pichardo RN  22 02:40 EDT

## 2022-11-04 NOTE — H&P
[facility]    Patient Identification:  Name: Halie Guidry  Age: 82 y.o.  Sex: female  :  1940  MRN: [id number]         Primary Care Physician: Napoleon Mead MD    Chief Complaint   Patient presents with   • Hyperglycemia   • Altered Mental Status     Subjective   Patient is a 82 y.o. female brought in because of some altered mental status, hyperglycemia, diarrhea. Patient is currently awake and alert and appropriate. She states she has had a cough for about a month associated with upper respiratory symptoms. Last  she developed diarrhea and had it for a few days but it is improved now. She has not taken her medications for a couple of days and was hyperglycemic. In the ED x-ray showed a left base pneumonia and she was started on antibiotics. She is currently complaining of weakness and feeling tired.    Past Medical History:   Diagnosis Date   • Anxiety    • Arthritis    • Benign essential hypertension 2014   • 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HCC)    • Depression    • Diabetes (HCC)    • Diabetes mellitus, type 2 (HCC)    • Disc degeneration, lumbar    • Headache, tension-type    • Hyperlipidemia    • Hypothyroidism    • Neuropathy    • Osteoporosis 2015   • Peripheral neuropathy    • Rotator cuff tear, left    • Scoliosis    • Shoulder pain     LEFT, TORN ROTATOR CUFF S/P FALL   • Spinal stenosis      Past Surgical History:   Procedure Laterality Date   • APPENDECTOMY     • BILATERAL BREAST REDUCTION Bilateral 2015   • CATARACT EXTRACTION  2015   • CHOLECYSTECTOMY WITH INTRAOPERATIVE CHOLANGIOGRAM N/A 3/27/2022    Procedure: CHOLECYSTECTOMY LAPAROSCOPIC INTRAOPERATIVE CHOLANGIOGRAM;  Surgeon: Aiyana Hill MD;  Location: [facility];  Service: General;  Laterality: N/A;   • COLONOSCOPY  2015    WNL   • ERCP N/A 2022    Procedure: ENDOSCOPIC RETROGRADE CHOLANGIOPANCREATOGRAPHY with sphincterotomy and balloon sweep;   Surgeon: Chilo Wilhelm MD;  Location: [facility] ENDOSCOPY;  Service: Gastroenterology;  Laterality: N/A;  PRE/POST - CBD stones   • ERCP N/A 3/28/2022    Procedure: ENDOSCOPIC RETROGRADE CHOLANGIOPANCREATOGRAPHY WITH SPHINCTEROTOMY AND BALLOON SWEEP;  Surgeon: Chilo Wilhelm MD;  Location: [facility] ENDOSCOPY;  Service: Gastroenterology;  Laterality: N/A;  PRE: COMMON DUCT STONE  POST: COMMON DUCT STONE   • KYPHOPLASTY     • REDUCTION MAMMAPLASTY     • TONSILLECTOMY       Current medications reviewed.    Family History   Problem Relation Age of Onset   • Bone cancer Mother    • No Known Problems Father      Social History     Tobacco Use   • Smoking status: Former     Packs/day: 1.00     Types: Cigarettes     Quit date:      Years since quittin.8   • Smokeless tobacco: Never   Vaping Use   • Vaping Use: Never used   Substance Use Topics   • Alcohol use: No   • Drug use: No     Review of Systems   Constitutional: Positive for appetite change.   HENT: Positive for rhinorrhea.    Eyes: Negative.    Respiratory: Positive for cough.    Cardiovascular: Negative for chest pain.   Gastrointestinal: Positive for diarrhea. Negative for abdominal pain.   Endocrine: Negative.    Genitourinary: Negative for dysuria.   Musculoskeletal: Negative.    Skin: Negative.    Neurological: Positive for weakness.   Hematological: Negative.    Psychiatric/Behavioral: Positive for confusion.     Objective      Vital Signs  Temp:  [97.6 °F (36.4 °C)-98.2 °F (36.8 °C)] 97.6 °F (36.4 °C)  Heart Rate:  [68-78] 73  Resp:  [14-18] 18  BP: (121-170)/(58-85) 170/85  Body mass index is 31.41 kg/m².    Physical Exam  Constitutional:       General: She is not in acute distress.     Appearance: Normal appearance. She is ill-appearing. She is not toxic-appearing.   HENT:      Head: Normocephalic and atraumatic.   Cardiovascular:      Rate and Rhythm: Normal rate and regular rhythm.   Pulmonary:      Effort: Pulmonary effort is normal. No  respiratory distress.      Breath sounds: Examination of the left-lower field reveals rales. Rales present. No wheezing or rhonchi.   Abdominal:      General: Bowel sounds are normal.      Palpations: Abdomen is soft.      Tenderness: There is no abdominal tenderness. There is no guarding or rebound.   Musculoskeletal:         General: No swelling.      Right lower leg: No edema.      Left lower leg: No edema.   Skin:     General: Skin is warm and dry.   Neurological:      General: No focal deficit present.      Mental Status: She is alert and oriented to person, place, and time.      Comments: She is alert and appropriate and oriented to hospital, situation, self, year.   Psychiatric:         Mood and Affect: Mood normal.         Behavior: Behavior normal.         Thought Content: Thought content normal.     I have personally reviewed:  [x]  Laboratory   [x]  Microbiology   [x]  Radiology   [x]  EKG/Telemetry   []  Cardiology/Vascular   []  Pathology   [x]  Records    Lab Results   Component Value Date    ANIONGAP 15.8 (H) 11/04/2022     Estimated Creatinine Clearance: 39.3 mL/min (A) (by C-G formula based on SCr of 1.32 mg/dL (H)).    Assessment & Plan   Active Hospital Problems    Diagnosis  POA   • **Left lower lobe pneumonia [J18.9]  Unknown   • Diarrhea [R19.7]  Unknown   • Acute metabolic encephalopathy [G93.41]  Yes   • VIPUL (acute kidney injury) (HCC) [N17.9]  Yes   • Type 2 diabetes mellitus, with long-term current use of insulin (HCC) [E11.9, Z79.4]  Not Applicable   • Benign essential hypertension [I10]  Yes   • Stage 3a chronic kidney disease (HCC) [N18.31]  Yes      Resolved Hospital Problems   No resolved problems to display.     82 y.o. female with history of hypertension, CKD 3, diabetes mellitus type 2 admitted with metabolic encephalopathy due to left lower lobe pneumonia. She is also had diarrhea and has VIPUL. Diarrhea has resolved    Left lower lobe pneumonia  -Continue antibiotics await  culture  -Legionella pending. LFTs okay however  -Respiratory panel negative  -Lactate and procalcitonin not elevated. WBC improved.    Diarrhea  -Resolved may be associated with some pneumonia   -If recurs check stool PCR    VIPUL/CKD 3 with probably metabolic acidosis   -Likely due to diarrhea causing volume depletion  -Continue IVF. Creatinine already improved  -Hold diuretic and ACE inhibitor for now but likely can resume soon    Metabolic encephalopathy  -Due to above  -CT head negative  -She is currently alert and appropriate and oriented  -She has had metabolic encephalopathy in the past  -PT  -may need SNF    Hypertension  -She has had accelerated hypertension in the past requiring Cardene  -Resume home medications and monitor    Hypothyroidism  -Levothyroxine    DM2 with neuropathy  -A1c 7.70% in September  -Attenuated long-acting and mealtime she has had a history of requiring less insulin in the hospital  -Low-dose correctional insulin  -Had pharmacy verified long-acting doses    Discussed with patient CODE STATUS she wishes to be DNR    Discussed with patient and nursing staff.    Efe Moses MD  [facility]  11/04/22

## 2022-11-04 NOTE — PROGRESS NOTES
Pharmacy Consult  Pharmacy asked to verify home insulin dose.  Per progress note from last appointment with Dr Barrow (endocrinology) on 6/9/22 pt should be on the following:      Trulicity 1.5mg qweek      Humalog 9 units + SSI (2 - for 50 over 150) TID AC      Toujeo 60 units at bedtime   But pt reports she only takes the humalog if her blood sugar is greater than 150 and then she uses a sliding scale  And [facility] reports Humalog was last filled in May with the instructions: 6 units four times a day with meals.  [facility] did confirm the Trulicity and Toujeo.  Admission med rec has been updated.  Thank you,  Meño Mendoza PharmD

## 2022-11-04 NOTE — PLAN OF CARE
Goal Outcome Evaluation:  Plan of Care Reviewed With: patient        Progress: no change  Outcome Evaluation: pt A&Ox4 and VSS on RA. No c/o pain this shift. Barrier cream applied to marquez area, excoriation on abd folds and under breasts bilat. IV abx given per  MAR. SSI given per MAR. Urine cx and MRSA collected and sent to lab. IVF infusing. new IV in RFA. Safety measures in place. Will CTM the rest of my shift.

## 2022-11-04 NOTE — CONSULTS
Nutrition Services    Patient Name:  Halie Guidry  YOB: 1940  MRN: [id number]  Admit Date:  11/3/2022        CLINICAL NUTRITION ASSESSMENT      Reason for Assessment Nurse Admission Screen     Diagnosis/Problem   Left lower lobe pneumonia   Medical/Surgical History Past Medical History:   Diagnosis Date   • Anxiety    • Arthritis    • Benign essential hypertension 08/20/2014   • 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HCC)    • Depression    • Diabetes (HCC)    • Diabetes mellitus, type 2 (HCC)    • Disc degeneration, lumbar    • Headache, tension-type    • Hyperlipidemia    • Hypothyroidism    • Neuropathy    • Osteoporosis 09/09/2015   • Peripheral neuropathy    • Rotator cuff tear, left    • Scoliosis    • Shoulder pain     LEFT, TORN ROTATOR CUFF S/P FALL   • Spinal stenosis        Past Surgical History:   Procedure Laterality Date   • APPENDECTOMY     • BILATERAL BREAST REDUCTION Bilateral 08/2015   • CATARACT EXTRACTION  03/2015   • CHOLECYSTECTOMY WITH INTRAOPERATIVE CHOLANGIOGRAM N/A 3/27/2022    Procedure: CHOLECYSTECTOMY LAPAROSCOPIC INTRAOPERATIVE CHOLANGIOGRAM;  Surgeon: Aiyana Hill MD;  Location: [facility] OR;  Service: General;  Laterality: N/A;   • COLONOSCOPY  06/05/2015    WNL   • ERCP N/A 2/25/2022    Procedure: ENDOSCOPIC RETROGRADE CHOLANGIOPANCREATOGRAPHY with sphincterotomy and balloon sweep;  Surgeon: Chilo Wilhelm MD;  Location: [facility] ENDOSCOPY;  Service: Gastroenterology;  Laterality: N/A;  PRE/POST - CBD stones   • ERCP N/A 3/28/2022    Procedure: ENDOSCOPIC RETROGRADE CHOLANGIOPANCREATOGRAPHY WITH SPHINCTEROTOMY AND BALLOON SWEEP;  Surgeon: Chilo Wilhelm MD;  Location: [facility] ENDOSCOPY;  Service: Gastroenterology;  Laterality: N/A;  PRE: COMMON DUCT STONE  POST: COMMON DUCT STONE   • KYPHOPLASTY     • REDUCTION MAMMAPLASTY     • TONSILLECTOMY          Encounter Information        Nutrition/Diet History:  Per MD notes pt endorsing appetite change, diarrhea and  "increased fatigue.    Food Preferences:    Supplements:    Factors Affecting Intake: altered mental status, decreased appetite, diarrhea, fatigue     Anthropometrics        Current Height  Current Weight  BMI kg/m2 Height: 172.7 cm (68\")  Weight: 93.7 kg (206 lb 9.1 oz) (11/04/22 0519)  Body mass index is 31.41 kg/m².       Admission Weight 206 lb (93.7 kg)   Ideal Body Weight (IBW) 140 lb (63.9 kg)   Usual Body Weight (UBW)    Weight Change/Trend        Weight History Wt Readings from Last 30 Encounters:   11/04/22 0519 93.7 kg (206 lb 9.1 oz)   09/12/22 1427 86.6 kg (191 lb)   09/01/22 2027 81.6 kg (180 lb)   06/23/22 1018 85.3 kg (188 lb)   06/23/22 0400 85.3 kg (188 lb)   06/09/22 1425 85.3 kg (188 lb)   06/02/22 1349 84.6 kg (186 lb 9.6 oz)   05/27/22 0217 80 kg (176 lb 5.9 oz)   05/26/22 2201 79.4 kg (175 lb)   03/28/22 0610 83.9 kg (185 lb)   03/26/22 1023 83.9 kg (185 lb)   02/24/22 1500 84 kg (185 lb 3 oz)   02/23/22 1019 84 kg (185 lb 3.3 oz)   02/23/22 0355 77.1 kg (170 lb)   12/02/21 1601 85.7 kg (189 lb)   12/02/21 0423 85.9 kg (189 lb 6 oz)   11/01/21 1216 77.1 kg (170 lb)   06/08/21 1502 80.7 kg (178 lb)   01/18/21 1336 87.1 kg (192 lb)   11/30/20 1317 85.3 kg (188 lb)   06/18/20 1345 69.4 kg (153 lb)   01/27/20 1508 77.1 kg (170 lb)   08/16/19 1339 78 kg (172 lb)   05/21/19 1305 78.9 kg (174 lb)   05/16/19 1400 79.4 kg (175 lb)   02/14/19 1153 84.6 kg (186 lb 9.6 oz)   12/10/18 1321 84.8 kg (187 lb)   10/24/18 1301 84.4 kg (186 lb)   05/24/18 1310 79.4 kg (175 lb)   01/22/18 1654 83.9 kg (185 lb)   01/09/18 1233 83.9 kg (185 lb)   12/19/17 0500 85.8 kg (189 lb 2.5 oz)   12/17/17 2138 83.8 kg (184 lb 11.9 oz)   12/15/17 1759 86.2 kg (190 lb)   12/17/17 1222 86.2 kg (190 lb)   11/14/17 1335 86.2 kg (190 lb)   08/01/17 1418 85.7 kg (189 lb)   06/20/17 1114 82.1 kg (181 lb)             Tests/Procedures        Tests/Procedures CT scan     Labs       Pertinent Labs    Results from last 7 days   Lab Units " 11/04/22  0801 11/03/22  2311   SODIUM mmol/L 138 135*   POTASSIUM mmol/L 4.4 4.9   CHLORIDE mmol/L 105 103   CO2 mmol/L 17.2* 20.7*   BUN mg/dL 67* 77*   CREATININE mg/dL 1.32* 1.69*   CALCIUM mg/dL 8.9 9.1   BILIRUBIN mg/dL  --  0.3   ALK PHOS U/L  --  110   ALT (SGPT) U/L  --  10   AST (SGOT) U/L  --  13   GLUCOSE mg/dL 234* 341*     Results from last 7 days   Lab Units 11/04/22  0801 11/03/22  2311   MAGNESIUM mg/dL  --  2.4   HEMOGLOBIN g/dL 12.2 12.6   HEMATOCRIT % 36.3 37.2   WBC 10*3/mm3 10.03 11.81*   ALBUMIN g/dL  --  4.20     Results from last 7 days   Lab Units 11/04/22  0801 11/03/22  2311   INR   --  1.03   APTT seconds  --  34.7   PLATELETS 10*3/mm3 286 355     COVID19   Date Value Ref Range Status   11/03/2022 Not Detected Not Detected - Ref. Range Final     Lab Results   Component Value Date    HGBA1C 7.70 (H) 09/01/2022          Medications           Scheduled Medications amLODIPine, 10 mg, Oral, Q24H  aspirin, 81 mg, Oral, Daily  atorvastatin, 80 mg, Oral, Nightly  bisoprolol, 5 mg, Oral, Daily  cefTRIAXone, 1 g, Intravenous, Q24H  DULoxetine, 30 mg, Oral, Daily  enoxaparin, 40 mg, Subcutaneous, Daily  HYDROcodone-acetaminophen, 1 tablet, Oral, TID  insulin glargine, 30 Units, Subcutaneous, Nightly  insulin lispro, 0-7 Units, Subcutaneous, 4x Daily With Meals & Nightly  levothyroxine, 112 mcg, Oral, Daily  pantoprazole, 40 mg, Oral, QAM  pregabalin, 50 mg, Oral, TID  sodium chloride, 10 mL, Intravenous, Q12H  cyanocobalamin, 1,000 mcg, Oral, Daily       Infusions lactated ringers, 100 mL/hr, Last Rate: 100 mL/hr (11/04/22 1133)       PRN Medications •  dextrose  •  dextrose  •  glucagon (human recombinant)  •  sodium chloride  •  [COMPLETED] Insert peripheral IV **AND** sodium chloride  •  sodium chloride     Physical Findings        Physical Appearance alert, oriented, room air     NFPE Not applicable   --  Edema  lower extremity , 2+ (mild)   Gastrointestinal diarrhea, last bowel movement:11/4    Tubes/Drains none   Oral/Mouth Cavity dentures   Skin bruising, excoriation   --  Current Nutrition Orders & Evaluation of Intake       Oral Nutrition     Food Allergies NKFA   Current PO Diet Diet Regular; Cardiac, Consistent Carbohydrate, Renal   Supplement n/a   PO Evaluation     Trending % PO Intake        --  PES STATEMENT / NUTRITION DIAGNOSIS      Nutrition Dx Problem  Problem: Predicted Suboptimal Intake  Etiology: Medical Diagnosis and Factors Affecting Nutrition  Signs/Symptoms: Report of Minimal PO Intake and Report/Observation    Comment: Pt reports decreased appetite and diarrhea PTA.    --  NUTRITION INTERVENTION      Intervention Goal(s) Meet estimated needs, Establish PO intake, Tolerate PO  and No significant weight loss         RD Intervention/Action Supplement provided, Encourage intake, Follow Tx Progress and Care plan reviewed         Prescription/Orders:       PO Diet       Supplements Boost Glucose Control BID with meals       Enteral Nutrition       Parenteral Nutrition    New Prescription Ordered? Yes.    --      Monitor/Evaluation PO intake, Supplement intake, Pertinent labs, Weight, Skin status, GI status   Education Will instruct as appropriate   --    RD to follow per protocol.      Electronically signed by:  Micki Vaughan RD  11/04/22 13:18 EDT

## 2022-11-04 NOTE — CASE MANAGEMENT/SOCIAL WORK
Discharge Planning Assessment  [facility]     Patient Name: Halie Guidry  MRN: [id number]  Today's Date: 11/4/2022    Admit Date: 11/3/2022    Plan: Home with [facility] and son, follow for additional dc needs   Discharge Needs Assessment     Row Name 11/04/22 1540       Living Environment    People in Home child(beatriz), adult    Current Living Arrangements home    Primary Care Provided by self;child(beatriz)    Provides Primary Care For no one, unable/limited ability to care for self    Family Caregiver if Needed child(beatriz), adult    Quality of Family Relationships helpful;involved    Able to Return to Prior Arrangements yes       Resource/Environmental Concerns    Resource/Environmental Concerns none    Transportation Concerns no car       Transition Planning    Patient/Family Anticipates Transition to home with family;home with help/services    Patient/Family Anticipated Services at Transition home health care    Transportation Anticipated family or friend will provide       Discharge Needs Assessment    Readmission Within the Last 30 Days no previous admission in last 30 days    Equipment Currently Used at Home other (see comments)    Concerns to be Addressed discharge planning;adjustment to diagnosis/illness    Equipment Needed After Discharge other (see comments)    Discharge Facility/Level of Care Needs home with home health;nursing facility, skilled    Current Discharge Risk chronically ill               Discharge Plan     Row Name 11/04/22 1539       Plan    Plan Home with [facility] and son, follow for additional dc needs    Roadmap to Recovery Yes    Patient/Family in Agreement with Plan yes    Provided Post Acute Provider List? Yes    Post Acute Provider List Home Health;Nursing Home    Provided Post Acute Provider Quality & Resource List? N/A    Plan Comments CCP spoke with pt at bedside, introduced self and explained CCP role. Verified facesheet and confirmed local pharmacy is [facility] in  [place]. Pt denies problems with medication costs. Pts advance directive on file. PCP is Dr. Mead. Pt lives at home with son Derrick [phone] in a s/s home with no steps to enter. Pt states she is normally IADL with her walker, including bathing and dressing. Pt does not drive, she states Derrick provides her transportation needs. She states her son works from home and is with her 24/7 if needed. Pt also has a wc, rollator and shower chair. Pt is current with [facility] and states they come twice a week. Pt has been to [facility], [facility] and [facility] in the past. CCP discussed dc planning and she plans home with her son and [facility]. Explained will follow PT/OT evals and anything for additional dc needs. [facility] and pt is current with them and she will follow. Tobias MILLER/CCP              Continued Care and Services - Admitted Since 11/3/2022     Home Medical Care     Service Provider Request Status Selected Services Address Phone Fax Patient Preferred    [facility] Accepted N/A [address] [facility], [address], [facility] [address] [phone] --            Selected Continued Care - Prior Encounters Includes continued care and service providers with selected services from prior encounters from 8/5/2022 to 11/4/2022    Discharged on 9/6/2022 Admission date: 9/1/2022 - Discharge disposition: Home-Health Care [facility]    Home Medical Care     Service Provider Selected Services Address Phone Fax Patient Preferred    [facility] [address], [address], [facility] [address] [phone] --                       Demographic Summary     Row Name 11/04/22 8980       General Information    Admission Type inpatient    Referral Source admission list    Reason for Consult discharge planning    Preferred Language English       Contact Information     Permission Granted to Share Info With family/designee;facility                Functional Status    No documentation.                Psychosocial    No documentation.                Abuse/Neglect    No documentation.                Legal    No documentation.                Substance Abuse    No documentation.                Patient Forms    No documentation.                   Isaura Romeo RN

## 2022-11-04 NOTE — DISCHARGE PLACEMENT REQUEST
"Tabatha Guidry (82 y.o. Female)     Date of Birth   1940    Social Security Number       Address   [address] [place] [address] [address] [place] [address]    Home Phone   [phone]    MRN   [id number]       Restorationist   Zoroastrianism    Marital Status                               Admission Date   11/3/22    Admission Type   Emergency    Admitting Provider   Kelechi Sanchez MD    Attending Provider   Efe Moses MD    Department, Room/Bed   [address], [address]       Discharge Date       Discharge Disposition       Discharge Destination                               Attending Provider: Efe Moses MD    Allergies: Sulfa Antibiotics    Isolation: None   Infection: None   Code Status: No CPR    Ht: 172.7 cm (68\")   Wt: 93.7 kg (206 lb 9.1 oz)    Admission Cmt: None   Principal Problem: Left lower lobe pneumonia [J18.9]                 Active Insurance as of 11/3/2022     Primary Coverage     Payor Plan Insurance Group Employer/Plan Group    [facility] MEDICARE REPLACEMENT [facility] MEDICARE REPLACEMENT [id number]     Payor Plan Address Payor Plan Phone Number Payor Plan Fax Number Effective Dates    [address] [address]   1/1/2016 - None Entered    [facility] [id number]       Subscriber Name Subscriber Birth Date Member ID       TABATHA GUIDRY 1940 [id number]                 Emergency Contacts      (Rel.) Home Phone Work Phone Mobile Phone    Derrick Guidry (Son) [phone] -- [phone]    Josse Guidry (Son) [phone] -- [phone]              "

## 2022-11-04 NOTE — ED PROVIDER NOTES
EMERGENCY DEPARTMENT ENCOUNTER    Room Number:  N526/1  Date of encounter:  11/4/2022  PCP: Napoleon Mead MD  Historian: Patient, family      HPI:  Chief Complaint: Altered mental status, diarrhea, hyperglycemia  A complete HPI/ROS/PMH/PSH/SH/FH are unobtainable due to: None    Context: Halie Guidry is a 82 y.o. female who presents to the ED by family.  They state that over the last 2 days she has had diarrhea and some confusion.  Patient seems to not know what is going on tends to space out and not watch TV the way she normally does.  Patient not been complaining of any pain no vomiting or nausea.  No fevers espresso is noticed no focal weakness.  Patient has not taken her medication for 2 days.  Found to be hyperglycemic this afternoon.      PAST MEDICAL HISTORY  Active Ambulatory Problems     Diagnosis Date Noted   • Compression fracture 04/22/2009   • Lumbar degenerative disc disease 07/05/2012   • Type 2 diabetes mellitus, with long-term current use of insulin ([facility])    • Hyperlipidemia    • Benign essential hypertension 08/20/2014   • Primary hypothyroidism    • Leukocytosis    • Neuropathy    • Osteoporosis 09/09/2015   • Proteinuria 02/28/2012   • Tobacco abuse 08/22/2013   • Diabetic eye exam ([facility]) 02/12/2014   • Generalized weakness 05/27/2017   • Spinal stenosis 05/27/2017   • Scoliosis 05/27/2017   • Arthritis 05/27/2017   • VBI (vertebrobasilar insufficiency) 05/28/2017   • Orthostatic hypotension 05/28/2017   • 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 ([facility]) 05/28/2017   • Severe hypothyroidism 05/30/2017   • Noncompliance with medication regimen 05/30/2017   • Vitamin D deficiency disease 06/01/2017   • Altered mental status 12/15/2017   • Tremor 01/09/2018   • Seizure ([facility]) 05/21/2019   • Stage 3a chronic kidney disease ([facility]) 03/09/2012   • Thoracic degenerative disc disease 01/27/2020   • Metabolic encephalopathy 12/02/2021   • VIPUL (acute kidney injury) ([facility]) 12/02/2021   •  Hyperglycemia 12/02/2021   • Type II diabetes mellitus, uncontrolled 12/02/2021   • Lower abdominal pain 02/23/2022   • Transaminitis 02/23/2022   • COVID-19 virus detected 02/23/2022   • UTI (urinary tract infection) 02/23/2022   • 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   • Choledocholithiasis 02/23/2022   • Hypokalemia 02/24/2022   • Hypoxia 02/24/2022   • Generalized abdominal pain 03/26/2022   • History of Clostridium difficile infection 03/26/2022   • History of ERCP 03/26/2022   • Acute UTI (urinary tract infection) 03/27/2022   • Hypomagnesemia 03/28/2022   • Burning pain 05/27/2022   • Anxiety disorder 05/27/2022   • Neuropathic pain 05/27/2022   • Intertrigo 05/27/2022   • Weakness of both lower extremities 06/24/2022   • Acute metabolic encephalopathy 06/24/2022   • Accelerated hypertension 09/01/2022   • Acute cystitis without hematuria 09/06/2022     Resolved Ambulatory Problems     Diagnosis Date Noted   • Acute metabolic encephalopathy 06/22/2022   • Hypoglycemia 06/23/2022     Past Medical History:   Diagnosis Date   • Anxiety    • 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HCC)    • Depression    • Diabetes (HCC)    • Diabetes mellitus, type 2 (HCC)    • Disc degeneration, lumbar    • Headache, tension-type    • Hypothyroidism    • Peripheral neuropathy    • Rotator cuff tear, left    • Shoulder pain          PAST SURGICAL HISTORY  Past Surgical History:   Procedure Laterality Date   • APPENDECTOMY     • BILATERAL BREAST REDUCTION Bilateral 08/2015   • CATARACT EXTRACTION  03/2015   • CHOLECYSTECTOMY WITH INTRAOPERATIVE CHOLANGIOGRAM N/A 3/27/2022    Procedure: CHOLECYSTECTOMY LAPAROSCOPIC INTRAOPERATIVE CHOLANGIOGRAM;  Surgeon: Aiyana Hill MD;  Location: [facility] MAIN OR;  Service: General;  Laterality: N/A;   • COLONOSCOPY  06/05/2015    WNL   • ERCP N/A 2/25/2022    Procedure: ENDOSCOPIC RETROGRADE CHOLANGIOPANCREATOGRAPHY with sphincterotomy and balloon sweep;  Surgeon: Chilo Wilhelm MD;  Location: [facility]  ENDOSCOPY;  Service: Gastroenterology;  Laterality: N/A;  PRE/POST - CBD stones   • ERCP N/A 3/28/2022    Procedure: ENDOSCOPIC RETROGRADE CHOLANGIOPANCREATOGRAPHY WITH SPHINCTEROTOMY AND BALLOON SWEEP;  Surgeon: Chilo Wilhelm MD;  Location: [facility] ENDOSCOPY;  Service: Gastroenterology;  Laterality: N/A;  PRE: COMMON DUCT STONE  POST: COMMON DUCT STONE   • KYPHOPLASTY     • REDUCTION MAMMAPLASTY     • TONSILLECTOMY           FAMILY HISTORY  Family History   Problem Relation Age of Onset   • Bone cancer Mother    • No Known Problems Father          SOCIAL HISTORY  Social History     Socioeconomic History   • Marital status:    Tobacco Use   • Smoking status: Former     Packs/day: 1.00     Types: Cigarettes     Quit date:      Years since quittin.8   • Smokeless tobacco: Never   Vaping Use   • Vaping Use: Never used   Substance and Sexual Activity   • Alcohol use: No   • Drug use: No   • Sexual activity: Defer         ALLERGIES  Sulfa antibiotics        REVIEW OF SYSTEMS  Review of Systems     All systems reviewed and negative except for those discussed in HPI.       PHYSICAL EXAM    I have reviewed the triage vital signs and nursing notes.    ED Triage Vitals [22 2243]   Temp Heart Rate Resp BP SpO2   97.7 °F (36.5 °C) 72 14 138/66 99 %      Temp src Heart Rate Source Patient Position BP Location FiO2 (%)   Oral Monitor Sitting Right arm --       Physical Exam  GENERAL: not distressed  HENT: nares patent  EYES: no scleral icterus  CV: regular rhythm, regular rate  RESPIRATORY: normal effort  ABDOMEN: soft, nontender nondistended  MUSCULOSKELETAL: no deformity  NEURO: alert and oriented x3, moves all extremities, follows commands  SKIN: warm, dry        LAB RESULTS  Recent Results (from the past 24 hour(s))   POC Glucose Once    Collection Time: 22 10:44 PM    Specimen: Blood   Result Value Ref Range    Glucose 302 (H) 70 - 130 mg/dL   Green Top (Gel)    Collection Time: 22  11:11 PM   Result Value Ref Range    Extra Tube Hold for add-ons.    Lavender Top    Collection Time: 11/03/22 11:11 PM   Result Value Ref Range    Extra Tube hold for add-on    Gold Top - SST    Collection Time: 11/03/22 11:11 PM   Result Value Ref Range    Extra Tube Hold for add-ons.    Light Blue Top    Collection Time: 11/03/22 11:11 PM   Result Value Ref Range    Extra Tube Hold for add-ons.    Comprehensive Metabolic Panel    Collection Time: 11/03/22 11:11 PM    Specimen: Blood   Result Value Ref Range    Glucose 341 (H) 65 - 99 mg/dL    BUN 77 (H) 8 - 23 mg/dL    Creatinine 1.69 (H) 0.57 - 1.00 mg/dL    Sodium 135 (L) 136 - 145 mmol/L    Potassium 4.9 3.5 - 5.2 mmol/L    Chloride 103 98 - 107 mmol/L    CO2 20.7 (L) 22.0 - 29.0 mmol/L    Calcium 9.1 8.6 - 10.5 mg/dL    Total Protein 7.4 6.0 - 8.5 g/dL    Albumin 4.20 3.50 - 5.20 g/dL    ALT (SGPT) 10 1 - 33 U/L    AST (SGOT) 13 1 - 32 U/L    Alkaline Phosphatase 110 39 - 117 U/L    Total Bilirubin 0.3 0.0 - 1.2 mg/dL    Globulin 3.2 gm/dL    A/G Ratio 1.3 g/dL    BUN/Creatinine Ratio 45.6 (H) 7.0 - 25.0    Anion Gap 11.3 5.0 - 15.0 mmol/L    eGFR 30.0 (L) >60.0 mL/min/1.73   Procalcitonin    Collection Time: 11/03/22 11:11 PM    Specimen: Blood   Result Value Ref Range    Procalcitonin 0.06 0.00 - 0.25 ng/mL   Troponin    Collection Time: 11/03/22 11:11 PM    Specimen: Blood   Result Value Ref Range    Troponin T <0.010 0.000 - 0.030 ng/mL   Magnesium    Collection Time: 11/03/22 11:11 PM    Specimen: Blood   Result Value Ref Range    Magnesium 2.4 1.6 - 2.4 mg/dL   Protime-INR    Collection Time: 11/03/22 11:11 PM    Specimen: Blood   Result Value Ref Range    Protime 13.6 11.7 - 14.2 Seconds    INR 1.03 0.90 - 1.10   aPTT    Collection Time: 11/03/22 11:11 PM    Specimen: Blood   Result Value Ref Range    PTT 34.7 22.7 - 35.4 seconds   Beta Hydroxybutyrate Quantitative    Collection Time: 11/03/22 11:11 PM    Specimen: Blood   Result Value Ref Range     Beta-Hydroxybutyrate Quant 0.238 0.020 - 0.270 mmol/L   CBC Auto Differential    Collection Time: 11/03/22 11:11 PM    Specimen: Blood   Result Value Ref Range    WBC 11.81 (H) 3.40 - 10.80 10*3/mm3    RBC 4.27 3.77 - 5.28 10*6/mm3    Hemoglobin 12.6 12.0 - 15.9 g/dL    Hematocrit 37.2 34.0 - 46.6 %    MCV 87.1 79.0 - 97.0 fL    MCH 29.5 26.6 - 33.0 pg    MCHC 33.9 31.5 - 35.7 g/dL    RDW 14.8 12.3 - 15.4 %    RDW-SD 46.6 37.0 - 54.0 fl    MPV 9.4 6.0 - 12.0 fL    Platelets 355 140 - 450 10*3/mm3    Neutrophil % 79.9 (H) 42.7 - 76.0 %    Lymphocyte % 10.6 (L) 19.6 - 45.3 %    Monocyte % 5.8 5.0 - 12.0 %    Eosinophil % 2.2 0.3 - 6.2 %    Basophil % 0.8 0.0 - 1.5 %    Immature Grans % 0.7 (H) 0.0 - 0.5 %    Neutrophils, Absolute 9.44 (H) 1.70 - 7.00 10*3/mm3    Lymphocytes, Absolute 1.25 0.70 - 3.10 10*3/mm3    Monocytes, Absolute 0.69 0.10 - 0.90 10*3/mm3    Eosinophils, Absolute 0.26 0.00 - 0.40 10*3/mm3    Basophils, Absolute 0.09 0.00 - 0.20 10*3/mm3    Immature Grans, Absolute 0.08 (H) 0.00 - 0.05 10*3/mm3    nRBC 0.0 0.0 - 0.2 /100 WBC   Lactic Acid, Plasma    Collection Time: 11/03/22 11:27 PM    Specimen: Blood   Result Value Ref Range    Lactate 1.8 0.5 - 2.0 mmol/L   Ammonia    Collection Time: 11/03/22 11:27 PM    Specimen: Blood   Result Value Ref Range    Ammonia 26 11 - 51 umol/L   Respiratory Panel PCR w/COVID-19(SARS-CoV-2) NAY/JEANIE/GAYLE/PAD/COR/MAD/YANN In-House, NP Swab in UTM/VTM, 3-4 HR TAT - Swab, Nasopharynx    Collection Time: 11/03/22 11:28 PM    Specimen: Nasopharynx; Swab   Result Value Ref Range    ADENOVIRUS, PCR Not Detected Not Detected    Coronavirus 229E Not Detected Not Detected    Coronavirus HKU1 Not Detected Not Detected    Coronavirus NL63 Not Detected Not Detected    Coronavirus OC43 Not Detected Not Detected    COVID19 Not Detected Not Detected - Ref. Range    Human Metapneumovirus Not Detected Not Detected    Human Rhinovirus/Enterovirus Not Detected Not Detected    Influenza A  PCR Not Detected Not Detected    Influenza B PCR Not Detected Not Detected    Parainfluenza Virus 1 Not Detected Not Detected    Parainfluenza Virus 2 Not Detected Not Detected    Parainfluenza Virus 3 Not Detected Not Detected    Parainfluenza Virus 4 Not Detected Not Detected    RSV, PCR Not Detected Not Detected    Bordetella pertussis pcr Not Detected Not Detected    Bordetella parapertussis PCR Not Detected Not Detected    Chlamydophila pneumoniae PCR Not Detected Not Detected    Mycoplasma pneumo by PCR Not Detected Not Detected   ECG 12 Lead Altered Mental Status    Collection Time: 11/03/22 11:32 PM   Result Value Ref Range    QT Interval 430 ms   Blood Gas, Arterial -    Collection Time: 11/03/22 11:43 PM    Specimen: Arterial Blood   Result Value Ref Range    Site Arterial: right radial     Bebeto's Test Positive     pH, Arterial 7.333 (L) 7.350 - 7.450 pH units    pCO2, Arterial 41.0 35.0 - 45.0 mm Hg    pO2, Arterial 71.3 (L) 80.0 - 100.0 mm Hg    HCO3, Arterial 21.8 (L) 22.0 - 28.0 mmol/L    Base Excess, Arterial -3.9 (L) 0.0 - 2.0 mmol/L    O2 Saturation Calculated 93.0 92.0 - 99.0 %    A-a DO2 0.7 mmHg    Barometric Pressure for Blood Gas 753.8 mmHg    Modality Room Air     FIO2 21 %    Rate 18 Breaths/minute   POC Glucose Once    Collection Time: 11/04/22  1:22 AM    Specimen: Blood   Result Value Ref Range    Glucose 295 (H) 70 - 130 mg/dL   POC Glucose Once    Collection Time: 11/04/22  1:49 AM    Specimen: Blood   Result Value Ref Range    Glucose 204 (H) 70 - 130 mg/dL   POC Glucose Once    Collection Time: 11/04/22  5:48 AM    Specimen: Blood   Result Value Ref Range    Glucose 176 (H) 70 - 130 mg/dL       Ordered the above labs and independently reviewed the results.        RADIOLOGY  CT Head Without Contrast    Result Date: 11/4/2022  CT HEAD WITHOUT CONTRAST  HISTORY: Altered mental status  COMPARISON: 06/22/2022  TECHNIQUE: Axial CT imaging was obtained through the brain. No IV contrast was  administered.  FINDINGS: No acute intracranial hemorrhage is seen. There is diffuse atrophy. There is periventricular and deep white matter microangiopathic change. There is no midline shift or mass effect. Old lacunar infarcts are noted within the basal ganglia bilaterally. Paranasal sinuses and mastoid air cells appear clear.      No acute intracranial findings.  Radiation dose reduction techniques were utilized, including automated exposure control and exposure modulation based on body size.  This report was finalized on 11/4/2022 1:39 AM by Dr. Kristina Forman M.D.      XR Chest 1 View    Result Date: 11/4/2022  SINGLE VIEW OF THE CHEST  HISTORY: Altered mental status  COMPARISON: 09/01/2022  FINDINGS: Heart size is within normal limits. There is calcification of the aorta. No pneumothorax or pleural effusion is seen. Patient does appear to have some left basilar consolidation. Correlation with any evidence of pneumonia is suggested. There are advanced degenerative changes of the left shoulder.      Left basilar consolidation.  This report was finalized on 11/4/2022 12:10 AM by Dr. Kristina Forman M.D.        I ordered the above noted radiological studies. Reviewed by me and discussed with radiologist.  See dictation for official radiology interpretation.      PROCEDURES    Procedures      MEDICATIONS GIVEN IN ER    Medications   sodium chloride 0.9 % flush 10 mL (has no administration in time range)   sodium chloride 0.9 % flush 10 mL (has no administration in time range)   cefTRIAXone (ROCEPHIN) 1 g in sodium chloride 0.9 % 100 mL IVPB-VTB (0 g Intravenous Hold 11/4/22 0215)   sodium chloride 0.9 % flush 10 mL (has no administration in time range)   sodium chloride 0.9 % flush 10 mL (has no administration in time range)   dextrose (GLUTOSE) oral gel 15 g (has no administration in time range)   dextrose (D50W) (25 g/50 mL) IV injection 25 g (has no administration in time range)   glucagon (human  recombinant) (GLUCAGEN DIAGNOSTIC) injection 1 mg (has no administration in time range)   Enoxaparin Sodium (LOVENOX) syringe 40 mg (40 mg Subcutaneous Given 11/4/22 0216)   insulin lispro (ADMELOG) injection 0-14 Units (has no administration in time range)   azithromycin (ZITHROMAX) 500 mg in sodium chloride 0.9 % 250 mL IVPB-VTB (500 mg Intravenous Given 11/4/22 0214)   cefTRIAXone (ROCEPHIN) 1 g in sodium chloride 0.9 % 100 mL IVPB-VTB (0 g Intravenous Stopped 11/4/22 0214)   insulin regular (humuLIN R,novoLIN R) injection 10 Units (10 Units Intravenous Given 11/4/22 0121)         PROGRESS, DATA ANALYSIS, CONSULTS, AND MEDICAL DECISION MAKING    All labs have been independently reviewed by me.  All radiology studies have been reviewed by me and discussed with radiologist dictating the report.   EKG's independently viewed and interpreted by me.  Discussion below represents my analysis of pertinent findings related to patient's condition, differential diagnosis, treatment plan and final disposition.        ED Course as of 11/04/22 0634   Fri Nov 04, 2022   0001 EKG          EKG time: 2332  Rhythm/Rate: Sinus rhythm rate 76  P waves and WA: Normal  QRS, axis: IVCD  ST and T waves: Nonspecific    Interpreted Contemporaneously by me, independently viewed  No significant changed compared to prior EKG from 9/1/2022   [TJ]   0018 WBC(!): 11.81 [TJ]   0018 Ammonia: 26 [TJ]   0018 Procalcitonin: 0.06 [TJ]   0018 pH, Arterial(!): 7.333 [TJ]   0018 pCO2, Arterial: 41.0 [TJ]   0018 pO2, Arterial(!): 71.3 [TJ]   0026 Glucose(!): 341 [TJ]   0026 BUN(!): 77 [TJ]   0026 Creatinine(!): 1.69 [TJ]   0026 Sodium(!): 135 [TJ]   0026 Anion Gap: 11.3 [TJ]   0116 Lactate: 1.8 [TJ]   0116 Beta-Hydroxybutyrate Quant: 0.238 [TJ]   0116 Magnesium: 2.4 [TJ]      ED Course User Index  [TJ] Bertram Clark MD           PPE: The patient wore a surgical mask throughout the entire patient encounter. I wore an N95.    AS OF 06:34 EDT  VITALS:    BP - 170/66  HR - 71  TEMP - 98.2 °F (36.8 °C) (Oral)  O2 SATS - 97%        DIAGNOSIS  Final diagnoses:   Altered mental status, unspecified altered mental status type   Pneumonia of left lower lobe due to infectious organism   Hyperglycemia         DISPOSITION  Admit           Bertram Clark MD  11/04/22 0665

## 2022-11-04 NOTE — ED NOTES
"Pt to triage from home with c/o elevated blood sugar - pt had diarrhea 2 nights ago, hasn't taken her meds for a few days, blood sugars over 300.  Son states pt is \"cognitively 10 % of normal.\" Pt wearing mask in triage. Triage personnel wore appropriate PPE    Blood sugar 302 at triage    "

## 2022-11-04 NOTE — SIGNIFICANT NOTE
11/04/22 1238   OTHER   Discipline physical therapist   Rehab Time/Intention   Session Not Performed patient/family declined evaluation  (attempted to see for PT eval this am. Pt declines at this time, stating she does not feel well. Attempted to encourage pt, but she continues to decline. Will follow up tomorrow.)   Recommendation   PT - Next Appointment 11/05/22

## 2022-11-05 ENCOUNTER — APPOINTMENT (OUTPATIENT)
Dept: GENERAL RADIOLOGY | Facility: HOSPITAL | Age: 82
End: 2022-11-05

## 2022-11-05 LAB
ANION GAP SERPL CALCULATED.3IONS-SCNC: 10.7 MMOL/L (ref 5–15)
BASOPHILS # BLD AUTO: 0.06 10*3/MM3 (ref 0–0.2)
BASOPHILS NFR BLD AUTO: 0.7 % (ref 0–1.5)
BUN SERPL-MCNC: 40 MG/DL (ref 8–23)
BUN/CREAT SERPL: 45.5 (ref 7–25)
CALCIUM SPEC-SCNC: 9.6 MG/DL (ref 8.6–10.5)
CHLORIDE SERPL-SCNC: 108 MMOL/L (ref 98–107)
CO2 SERPL-SCNC: 22.3 MMOL/L (ref 22–29)
CREAT SERPL-MCNC: 0.88 MG/DL (ref 0.57–1)
DEPRECATED RDW RBC AUTO: 46.6 FL (ref 37–54)
EGFRCR SERPLBLD CKD-EPI 2021: 65.7 ML/MIN/1.73
EOSINOPHIL # BLD AUTO: 0.28 10*3/MM3 (ref 0–0.4)
EOSINOPHIL NFR BLD AUTO: 3.4 % (ref 0.3–6.2)
ERYTHROCYTE [DISTWIDTH] IN BLOOD BY AUTOMATED COUNT: 14.7 % (ref 12.3–15.4)
GLUCOSE BLDC GLUCOMTR-MCNC: 213 MG/DL (ref 70–130)
GLUCOSE BLDC GLUCOMTR-MCNC: 225 MG/DL (ref 70–130)
GLUCOSE BLDC GLUCOMTR-MCNC: 302 MG/DL (ref 70–130)
GLUCOSE BLDC GLUCOMTR-MCNC: 324 MG/DL (ref 70–130)
GLUCOSE SERPL-MCNC: 220 MG/DL (ref 65–99)
HCT VFR BLD AUTO: 34.5 % (ref 34–46.6)
HGB BLD-MCNC: 11.4 G/DL (ref 12–15.9)
IMM GRANULOCYTES # BLD AUTO: 0.04 10*3/MM3 (ref 0–0.05)
IMM GRANULOCYTES NFR BLD AUTO: 0.5 % (ref 0–0.5)
LYMPHOCYTES # BLD AUTO: 1.53 10*3/MM3 (ref 0.7–3.1)
LYMPHOCYTES NFR BLD AUTO: 18.6 % (ref 19.6–45.3)
MCH RBC QN AUTO: 28.6 PG (ref 26.6–33)
MCHC RBC AUTO-ENTMCNC: 33 G/DL (ref 31.5–35.7)
MCV RBC AUTO: 86.5 FL (ref 79–97)
MONOCYTES # BLD AUTO: 0.73 10*3/MM3 (ref 0.1–0.9)
MONOCYTES NFR BLD AUTO: 8.9 % (ref 5–12)
NEUTROPHILS NFR BLD AUTO: 5.6 10*3/MM3 (ref 1.7–7)
NEUTROPHILS NFR BLD AUTO: 67.9 % (ref 42.7–76)
NRBC BLD AUTO-RTO: 0 /100 WBC (ref 0–0.2)
PLATELET # BLD AUTO: 278 10*3/MM3 (ref 140–450)
PMV BLD AUTO: 9.4 FL (ref 6–12)
POTASSIUM SERPL-SCNC: 4.3 MMOL/L (ref 3.5–5.2)
QT INTERVAL: 430 MS
RBC # BLD AUTO: 3.99 10*6/MM3 (ref 3.77–5.28)
SODIUM SERPL-SCNC: 141 MMOL/L (ref 136–145)
WBC NRBC COR # BLD: 8.24 10*3/MM3 (ref 3.4–10.8)

## 2022-11-05 PROCEDURE — 63710000001 INSULIN LISPRO (HUMAN) PER 5 UNITS: Performed by: HOSPITALIST

## 2022-11-05 PROCEDURE — 85025 COMPLETE CBC W/AUTO DIFF WBC: CPT | Performed by: NURSE PRACTITIONER

## 2022-11-05 PROCEDURE — 73590 X-RAY EXAM OF LOWER LEG: CPT

## 2022-11-05 PROCEDURE — 80048 BASIC METABOLIC PNL TOTAL CA: CPT | Performed by: NURSE PRACTITIONER

## 2022-11-05 PROCEDURE — 73562 X-RAY EXAM OF KNEE 3: CPT

## 2022-11-05 PROCEDURE — 25010000002 ENOXAPARIN PER 10 MG: Performed by: NURSE PRACTITIONER

## 2022-11-05 PROCEDURE — 97162 PT EVAL MOD COMPLEX 30 MIN: CPT | Performed by: PHYSICAL THERAPIST

## 2022-11-05 PROCEDURE — 25010000002 CEFTRIAXONE PER 250 MG: Performed by: NURSE PRACTITIONER

## 2022-11-05 PROCEDURE — 82962 GLUCOSE BLOOD TEST: CPT

## 2022-11-05 PROCEDURE — 73610 X-RAY EXAM OF ANKLE: CPT

## 2022-11-05 RX ORDER — LISINOPRIL 10 MG/1
10 TABLET ORAL
Status: DISCONTINUED | OUTPATIENT
Start: 2022-11-05 | End: 2022-11-10

## 2022-11-05 RX ORDER — INSULIN LISPRO 100 [IU]/ML
5 INJECTION, SOLUTION INTRAVENOUS; SUBCUTANEOUS
Status: DISCONTINUED | OUTPATIENT
Start: 2022-11-05 | End: 2022-11-06

## 2022-11-05 RX ORDER — CHLORTHALIDONE 25 MG/1
25 TABLET ORAL DAILY
Status: DISCONTINUED | OUTPATIENT
Start: 2022-11-05 | End: 2022-11-10

## 2022-11-05 RX ADMIN — Medication 10 ML: at 21:18

## 2022-11-05 RX ADMIN — PREGABALIN 50 MG: 50 CAPSULE ORAL at 09:34

## 2022-11-05 RX ADMIN — INSULIN LISPRO 5 UNITS: 100 INJECTION, SOLUTION INTRAVENOUS; SUBCUTANEOUS at 21:54

## 2022-11-05 RX ADMIN — PREGABALIN 50 MG: 50 CAPSULE ORAL at 16:28

## 2022-11-05 RX ADMIN — DULOXETINE HYDROCHLORIDE 30 MG: 30 CAPSULE, DELAYED RELEASE ORAL at 09:34

## 2022-11-05 RX ADMIN — ENOXAPARIN SODIUM 40 MG: 100 INJECTION SUBCUTANEOUS at 09:34

## 2022-11-05 RX ADMIN — PREGABALIN 50 MG: 50 CAPSULE ORAL at 21:18

## 2022-11-05 RX ADMIN — INSULIN GLARGINE-YFGN 40 UNITS: 100 INJECTION, SOLUTION SUBCUTANEOUS at 21:54

## 2022-11-05 RX ADMIN — INSULIN LISPRO 3 UNITS: 100 INJECTION, SOLUTION INTRAVENOUS; SUBCUTANEOUS at 18:50

## 2022-11-05 RX ADMIN — LEVOTHYROXINE SODIUM 112 MCG: 0.11 TABLET ORAL at 09:34

## 2022-11-05 RX ADMIN — CEFTRIAXONE SODIUM 1 G: 1 INJECTION, POWDER, FOR SOLUTION INTRAMUSCULAR; INTRAVENOUS at 02:19

## 2022-11-05 RX ADMIN — ATORVASTATIN CALCIUM 80 MG: 20 TABLET, FILM COATED ORAL at 21:18

## 2022-11-05 RX ADMIN — INSULIN LISPRO 5 UNITS: 100 INJECTION, SOLUTION INTRAVENOUS; SUBCUTANEOUS at 18:50

## 2022-11-05 RX ADMIN — INSULIN LISPRO 3 UNITS: 100 INJECTION, SOLUTION INTRAVENOUS; SUBCUTANEOUS at 09:34

## 2022-11-05 RX ADMIN — ASPIRIN 81 MG: 81 TABLET, COATED ORAL at 09:34

## 2022-11-05 RX ADMIN — HYDROCODONE BITARTRATE AND ACETAMINOPHEN 1 TABLET: 5; 325 TABLET ORAL at 21:18

## 2022-11-05 RX ADMIN — PANTOPRAZOLE SODIUM 40 MG: 40 TABLET, DELAYED RELEASE ORAL at 06:34

## 2022-11-05 RX ADMIN — AMLODIPINE BESYLATE 10 MG: 10 TABLET ORAL at 09:34

## 2022-11-05 RX ADMIN — LISINOPRIL 10 MG: 10 TABLET ORAL at 16:28

## 2022-11-05 RX ADMIN — CHLORTHALIDONE 25 MG: 25 TABLET ORAL at 16:28

## 2022-11-05 RX ADMIN — Medication 1000 MCG: at 09:34

## 2022-11-05 RX ADMIN — SODIUM CHLORIDE, POTASSIUM CHLORIDE, SODIUM LACTATE AND CALCIUM CHLORIDE 100 ML/HR: 600; 310; 30; 20 INJECTION, SOLUTION INTRAVENOUS at 06:32

## 2022-11-05 RX ADMIN — BISOPROLOL FUMARATE 5 MG: 5 TABLET, FILM COATED ORAL at 09:34

## 2022-11-05 RX ADMIN — INSULIN LISPRO 5 UNITS: 100 INJECTION, SOLUTION INTRAVENOUS; SUBCUTANEOUS at 12:10

## 2022-11-05 RX ADMIN — HYDROCODONE BITARTRATE AND ACETAMINOPHEN 1 TABLET: 5; 325 TABLET ORAL at 16:28

## 2022-11-05 RX ADMIN — HYDROCODONE BITARTRATE AND ACETAMINOPHEN 1 TABLET: 5; 325 TABLET ORAL at 09:34

## 2022-11-05 NOTE — PLAN OF CARE
Problem: Adult Inpatient Plan of Care  Goal: Plan of Care Review  Outcome: Ongoing, Progressing  Flowsheets (Taken 11/5/2022 9947)  Progress: no change  Plan of Care Reviewed With: patient   Goal Outcome Evaluation:  Plan of Care Reviewed With: patient        Progress: no change    Uneventful night. Continue to encourage patient with turns. WCTM.

## 2022-11-05 NOTE — PROGRESS NOTES
Name: Halie Guidry ADMIT: 11/3/2022   : 1940  PCP: Napoleon Mead MD    MRN: [id number] LOS: 1 days   AGE/SEX: 82 y.o. female  ROOM: [facility]     Subjective   Subjective   She states she has chronic left lower leg pain since a fall in the summer. She made an appointment with orthopedic surgery but did not keep it. She has ambulated with a walker but reports pain with ambulation. Denies any chest pain. No diarrhea. No shortness of breath. No cough.    Review of Systems   Constitutional: Negative for fever.   Respiratory: Negative for cough and shortness of breath.    Cardiovascular: Negative for chest pain.   Gastrointestinal: Negative for abdominal pain, diarrhea, nausea and vomiting.   Genitourinary: Negative for dysuria.   Musculoskeletal: Positive for arthralgias.   Neurological: Negative for headaches.      Objective   Objective   Vital Signs  Temp:  [97.8 °F (36.6 °C)-99.6 °F (37.6 °C)] 98.2 °F (36.8 °C)  Heart Rate:  [68-81] 72  Resp:  [18-20] 18  BP: (147-168)/(62-79) 160/79  SpO2:  [94 %-97 %] 96 %  on   ;   Device (Oxygen Therapy): room air  Body mass index is 31.41 kg/m².    Physical Exam  Constitutional:       General: She is not in acute distress.     Appearance: Normal appearance. She is not toxic-appearing.   HENT:      Head: Normocephalic and atraumatic.   Cardiovascular:      Rate and Rhythm: Normal rate and regular rhythm.   Pulmonary:      Effort: Pulmonary effort is normal. No respiratory distress.      Breath sounds: Normal breath sounds. No wheezing, rhonchi or rales.   Abdominal:      General: Bowel sounds are normal.      Palpations: Abdomen is soft.      Tenderness: There is no abdominal tenderness. There is no guarding or rebound.   Musculoskeletal:         General: No swelling.      Right lower leg: No edema.      Left lower leg: No edema.      Comments: Left lower leg tender with movement   Skin:     General: Skin is warm and dry.   Neurological:      General: No focal  deficit present.      Mental Status: She is alert and oriented to person, place, and time.   Psychiatric:         Mood and Affect: Mood normal.         Behavior: Behavior normal.         Thought Content: Thought content normal.     Results Review  I reviewed the patient's new clinical results.  Results from last 7 days   Lab Units 11/05/22  0643 11/04/22  0801 11/03/22  2311   WBC 10*3/mm3 8.24 10.03 11.81*   HEMOGLOBIN g/dL 11.4* 12.2 12.6   PLATELETS 10*3/mm3 278 286 355     Results from last 7 days   Lab Units 11/05/22  0643 11/04/22  0801 11/03/22  2311   SODIUM mmol/L 141 138 135*   POTASSIUM mmol/L 4.3 4.4 4.9   CHLORIDE mmol/L 108* 105 103   CO2 mmol/L 22.3 17.2* 20.7*   BUN mg/dL 40* 67* 77*   CREATININE mg/dL 0.88 1.32* 1.69*   GLUCOSE mg/dL 220* 234* 341*     Lab Results   Component Value Date    ANIONGAP 10.7 11/05/2022     Estimated Creatinine Clearance: 59 mL/min (by C-G formula based on SCr of 0.88 mg/dL).    Results from last 7 days   Lab Units 11/03/22  2311   ALBUMIN g/dL 4.20   BILIRUBIN mg/dL 0.3   ALK PHOS U/L 110   AST (SGOT) U/L 13   ALT (SGPT) U/L 10     Results from last 7 days   Lab Units 11/05/22  0643 11/04/22  0801 11/03/22  2311   CALCIUM mg/dL 9.6 8.9 9.1   ALBUMIN g/dL  --   --  4.20   MAGNESIUM mg/dL  --   --  2.4     Results from last 7 days   Lab Units 11/03/22  2327 11/03/22  2311   PROCALCITONIN ng/mL  --  0.06   LACTATE mmol/L 1.8  --      Glucose   Date/Time Value Ref Range Status   11/05/2022 1048 302 (H) 70 - 130 mg/dL Final     Comment:     Meter: [id number] : [id number] Marlon Price MARY   11/05/2022 0538 213 (H) 70 - 130 mg/dL Final     Comment:     RN Notified R and V Meter: [id number] : [id number] Jonathan HERNANDEZ   11/04/2022 2114 198 (H) 70 - 130 mg/dL Final     Comment:     Meter: [id number] : carrie Archer RN   11/04/2022 1650 275 (H) 70 - 130 mg/dL Final     Comment:     Meter: [id number] : [id number] Eber HERNANDEZ   11/04/2022  1133 377 (H) 70 - 130 mg/dL Final     Comment:     Meter: [id number] : [id number] Eber HERNANDEZ   11/04/2022 0548 176 (H) 70 - 130 mg/dL Final     Comment:     Meter: [id number] : [id number] Eliu Melanae   11/04/2022 0149 204 (H) 70 - 130 mg/dL Final     Comment:     Meter: [id number] : [id number] Risk Shanita ERTech       CT Head Without Contrast    Result Date: 11/4/2022  No acute intracranial findings.  Radiation dose reduction techniques were utilized, including automated exposure control and exposure modulation based on body size.  This report was finalized on 11/4/2022 1:39 AM by Dr. Kristina Forman M.D.      XR Chest 1 View    Result Date: 11/4/2022  Left basilar consolidation.  This report was finalized on 11/4/2022 12:10 AM by Dr. Kristina Forman M.D.        Scheduled Meds  amLODIPine, 10 mg, Oral, Q24H  aspirin, 81 mg, Oral, Daily  atorvastatin, 80 mg, Oral, Nightly  bisoprolol, 5 mg, Oral, Daily  cefTRIAXone, 1 g, Intravenous, Q24H  DULoxetine, 30 mg, Oral, Daily  enoxaparin, 40 mg, Subcutaneous, Daily  HYDROcodone-acetaminophen, 1 tablet, Oral, TID  insulin glargine, 30 Units, Subcutaneous, Nightly  insulin lispro, 0-7 Units, Subcutaneous, 4x Daily With Meals & Nightly  levothyroxine, 112 mcg, Oral, Daily  pantoprazole, 40 mg, Oral, QAM  pregabalin, 50 mg, Oral, TID  sodium chloride, 10 mL, Intravenous, Q12H  cyanocobalamin, 1,000 mcg, Oral, Daily    Continuous Infusions  lactated ringers, 100 mL/hr, Last Rate: 100 mL/hr (11/05/22 0632)    PRN Meds  •  dextrose  •  dextrose  •  glucagon (human recombinant)  •  sodium chloride  •  [COMPLETED] Insert peripheral IV **AND** sodium chloride  •  sodium chloride    lactated ringers, 100 mL/hr, Last Rate: 100 mL/hr (11/05/22 0632)    Diet  Diet Regular; Cardiac, Consistent Carbohydrate, Renal    I have personally reviewed:  [x]  Laboratory   [x]  Microbiology   []  Radiology   []  EKG/Telemetry   []  Cardiology/Vascular   []  Pathology   []   Records     Assessment/Plan     Active Hospital Problems    Diagnosis  POA   • **Left lower lobe pneumonia [J18.9]  Unknown   • Diarrhea [R19.7]  Unknown   • Acute metabolic encephalopathy [G93.41]  Yes   • VIPUL (acute kidney injury) (HCC) [N17.9]  Yes   • Type 2 diabetes mellitus, with long-term current use of insulin (HCC) [E11.9, Z79.4]  Not Applicable   • Benign essential hypertension [I10]  Yes   • Stage 3a chronic kidney disease (HCC) [N18.31]  Yes      Resolved Hospital Problems   No resolved problems to display.     82 y.o. female with history of hypertension, CKD 3, diabetes mellitus type 2 admitted with metabolic encephalopathy due to left lower lobe pneumonia. She is also had diarrhea and has VIPUL. Diarrhea has resolved     Left lower lobe pneumonia  -Patient however minimal respiratory symptoms  -Legionella strep pneumonia MRSA and blood cultures negative so far  -Respiratory panel negative  -Lactate and procalcitonin not elevated. WBC normalized.  -Continue short course of antibiotics to treat     Left lower leg pain  -Check films    Diarrhea  -Resolved may be associated with some pneumonia   -If recurs check stool PCR     VIPUL/CKD 3 with probably metabolic acidosis   -Likely due to diarrhea causing volume depletion  -Creatinine normalized we will stop IVF  -Had been holding diuretic and ACE inhibitor but will resume since blood pressure increasing and renal failure resolved (will start ACE inhibitor lower dose)     Metabolic encephalopathy  -Due to above probably more renal failure than pneumonia  -CT head negative  -She is currently alert and appropriate and oriented  -She has had metabolic encephalopathy in the past  -PT     Hypertension  -Resuming ACE inhibitor and diuretic     Hypothyroidism  -Levothyroxine     DM2 with neuropathy  -A1c 7.70% in September  -Adjust long-acting insulin and add mealtime insulin continue correctional  -She has had a history of requiring less insulin in the hospital  monitor for hypoglycemia    SCDs for DVT prophylaxis    Discussed with patient and nursing staff    Discharge: Probably home soon with home health    Efe Moses MD  [facility]  11/05/22

## 2022-11-05 NOTE — PLAN OF CARE
Goal Outcome Evaluation:  Plan of Care Reviewed With: patient           Outcome Evaluation: Pt admitted from home with AMS and dirrhea. Found to have PNA. Pt reports she walks with a Rwx but mobility has been limited since fall in June. She has had LLE pain since the fall. Pt presents with limited ROM in  LLE and R shoulder, generalized weakness, imapired balane and difficulty walking. Pt would benefit from PT to address these impairments. She hopees to d/c home with her son, but may need rehab prior to returning home. Pt was able to sit EOB and complete a few LE exercises. She stood briefly , but was unable to stand fully upright secondary to back pain.

## 2022-11-05 NOTE — THERAPY EVALUATION
Patient Name: Halie Guidry  : 1940    MRN: [id number]                              Today's Date: 2022       Admit Date: 11/3/2022    Visit Dx:     ICD-10-CM ICD-9-CM   1. Altered mental status, unspecified altered mental status type  R41.82 780.97   2. Pneumonia of left lower lobe due to infectious organism  J18.9 486   3. Hyperglycemia  R73.9 790.29     Patient Active Problem List   Diagnosis   • Compression fracture   • Lumbar degenerative disc disease   • Type 2 diabetes mellitus, with long-term current use of insulin ([facility])   • Hyperlipidemia   • Benign essential hypertension   • Primary hypothyroidism   • Leukocytosis   • Neuropathy   • Osteoporosis   • Proteinuria   • Tobacco abuse   • Diabetic eye exam ([facility])   • Generalized weakness   • Spinal stenosis   • Scoliosis   • Arthritis   • VBI (vertebrobasilar insufficiency)   • Orthostatic hypotension   • 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 ([facility])   • Severe hypothyroidism   • Noncompliance with medication regimen   • Vitamin D deficiency disease   • Altered mental status   • Tremor   • Seizure ([facility])   • Stage 3a chronic kidney disease ([facility])   • Thoracic degenerative disc disease   • Metabolic encephalopathy   • VIPUL (acute kidney injury) ([facility])   • Hyperglycemia   • Type II diabetes mellitus, uncontrolled   • Lower abdominal pain   • Transaminitis   • COVID-19 virus detected   • UTI (urinary tract infection)   • Emphysematous cystitis   • Choledocholithiasis   • Hypokalemia   • Hypoxia   • Generalized abdominal pain   • History of Clostridium difficile infection   • History of ERCP   • Acute UTI (urinary tract infection)   • Hypomagnesemia   • Burning pain   • Anxiety disorder   • Neuropathic pain   • Intertrigo   • Weakness of both lower extremities   • Acute metabolic encephalopathy   • Accelerated hypertension   • Acute cystitis without hematuria   • Altered mental status, unspecified altered mental status type   • Left  lower lobe pneumonia   • Diarrhea     Past Medical History:   Diagnosis Date   • Anxiety    • Arthritis    • Benign essential hypertension 08/20/2014   • 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HCC)    • Depression    • Diabetes (HCC)    • Diabetes mellitus, type 2 (HCC)    • Disc degeneration, lumbar    • Headache, tension-type    • Hyperlipidemia    • Hypothyroidism    • Neuropathy    • Osteoporosis 09/09/2015   • Peripheral neuropathy    • Rotator cuff tear, left    • Scoliosis    • Shoulder pain     LEFT, TORN ROTATOR CUFF S/P FALL   • Spinal stenosis      Past Surgical History:   Procedure Laterality Date   • APPENDECTOMY     • BILATERAL BREAST REDUCTION Bilateral 08/2015   • CATARACT EXTRACTION  03/2015   • CHOLECYSTECTOMY WITH INTRAOPERATIVE CHOLANGIOGRAM N/A 3/27/2022    Procedure: CHOLECYSTECTOMY LAPAROSCOPIC INTRAOPERATIVE CHOLANGIOGRAM;  Surgeon: Aiyana Hill MD;  Location: [facility] OR;  Service: General;  Laterality: N/A;   • COLONOSCOPY  06/05/2015    WNL   • ERCP N/A 2/25/2022    Procedure: ENDOSCOPIC RETROGRADE CHOLANGIOPANCREATOGRAPHY with sphincterotomy and balloon sweep;  Surgeon: Chilo Wilhelm MD;  Location: [facility] ENDOSCOPY;  Service: Gastroenterology;  Laterality: N/A;  PRE/POST - CBD stones   • ERCP N/A 3/28/2022    Procedure: ENDOSCOPIC RETROGRADE CHOLANGIOPANCREATOGRAPHY WITH SPHINCTEROTOMY AND BALLOON SWEEP;  Surgeon: Chilo Wilhelm MD;  Location: [facility] ENDOSCOPY;  Service: Gastroenterology;  Laterality: N/A;  PRE: COMMON DUCT STONE  POST: COMMON DUCT STONE   • KYPHOPLASTY     • REDUCTION MAMMAPLASTY     • TONSILLECTOMY        General Information     Row Name 11/05/22 1758          Physical Therapy Time and Intention    Document Type evaluation  -     Mode of Treatment individual therapy;physical therapy  -     Row Name 11/05/22 1758          General Information    Patient Profile Reviewed yes  -KH     Prior Level of Function --  walks with walker, limited mobility since June -KH      Row Name 11/05/22 1758          Living Environment    People in Home child(beatriz), adult  -     Row Name 11/05/22 1758          Home Main Entrance    Number of Stairs, Main Entrance none  -     Row Name 11/05/22 1758          Cognition    Orientation Status (Cognition) oriented x 3  -           User Key  (r) = Recorded By, (t) = Taken By, (c) = Cosigned By    Initials Name Provider Type    Isaura Strickland, PT Physical Therapist               Mobility     Row Name 11/05/22 1759          Bed Mobility    Bed Mobility supine-sit;scooting/bridging;sit-supine  -     Scooting/Bridging [place] (Bed Mobility) moderate assist (50% patient effort)  -     Supine-Sit [place] (Bed Mobility) minimum assist (75% patient effort)  -     Sit-Supine [place] (Bed Mobility) minimum assist (75% patient effort)  -     Assistive Device (Bed Mobility) bed rails;head of bed elevated  -     Row Name 11/05/22 1759          Sit-Stand Transfer    Sit-Stand [place] (Transfers) minimum assist (75% patient effort)  -     Assistive Device (Sit-Stand Transfers) walker, front-wheeled  -     Comment, (Sit-Stand Transfer) foward flexed, unable to stand fully upright secondary to back pain  -           User Key  (r) = Recorded By, (t) = Taken By, (c) = Cosigned By    Initials Name Provider Type    Isaura Strickland, PT Physical Therapist               Obj/Interventions     Row Name 11/05/22 1759          Range of Motion Comprehensive    Comment, General Range of Motion L shoulder limited 2/2 rotator cuff injury, L knee limited by pain  -     Row Name 11/05/22 1759          Strength Comprehensive (MMT)    Comment, General Manual Muscle Testing (MMT) Assessment generalized weakness  -     Row Name 11/05/22 1759          Motor Skills    Therapeutic Exercise --  BLE AP, LAQ, seated marching x 5-10 reps, L side limited by pain  -     Row Name 11/05/22 1759          Balance    Balance Assessment  sitting static balance;sitting dynamic balance;standing static balance;standing dynamic balance  -     Static Sitting Balance independent  -KH     Dynamic Sitting Balance standby assist  -KH     Position, Sitting Balance unsupported;sitting edge of bed  -KH     Static Standing Balance minimal assist  -KH     Position/Device Used, Standing Balance supported;walker, front-wheeled  -KH           User Key  (r) = Recorded By, (t) = Taken By, (c) = Cosigned By    Initials Name Provider Type    Isaura Strickland, PT Physical Therapist               Goals/Plan     Row Name 11/05/22 1806          Bed Mobility Goal 1 (PT)    Activity/Assistive Device (Bed Mobility Goal 1, PT) bed mobility activities, all  -KH     [place] Level/Cues Needed (Bed Mobility Goal 1, PT) contact guard required  -KH     Time Frame (Bed Mobility Goal 1, PT) 1 week  -     Row Name 11/05/22 1806          Transfer Goal 1 (PT)    Activity/Assistive Device (Transfer Goal 1, PT) sit-to-stand/stand-to-sit;bed-to-chair/chair-to-bed  -KH     [place] Level/Cues Needed (Transfer Goal 1, PT) minimum assist (75% or more patient effort)  -KH     Time Frame (Transfer Goal 1, PT) 1 week  -     Row Name 11/05/22 1806          Gait Training Goal 1 (PT)    Activity/Assistive Device (Gait Training Goal 1, PT) gait (walking locomotion);walker, rolling  -KH     [place] Level (Gait Training Goal 1, PT) minimum assist (75% or more patient effort)  -KH     Distance (Gait Training Goal 1, PT) 50 ft  -KH     Time Frame (Gait Training Goal 1, PT) 1 week  -     Row Name 11/05/22 1806          Patient Education Goal (PT)    Activity (Patient Education Goal, PT) HEP  -KH     [place]/Cues/Accuracy (Memory Goal 2, PT) demonstrates adequately  -KH     Time Frame (Patient Education Goal, PT) 1 week  -     Row Name 11/05/22 1806          Therapy Assessment/Plan (PT)    Planned Therapy Interventions (PT) balance training;bed mobility training;gait  training;home exercise program;ROM (range of motion);transfer training;strengthening;patient/family education  -           User Key  (r) = Recorded By, (t) = Taken By, (c) = Cosigned By    Initials Name Provider Type    sIaura Strickland, PT Physical Therapist               Clinical Impression     Row Name 11/05/22 1800          Pain    Pretreatment Pain Rating 5/10  -KH     Posttreatment Pain Rating 6/10  -     Pain Location - Side/Orientation Left  -     Pain Location lower  -     Pain Location - extremity;back  -     Pain Intervention(s) Repositioned  -     Row Name 11/05/22 1800          Plan of Care Review    Plan of Care Reviewed With patient  -     Outcome Evaluation Pt admitted from home with AMS and dirrhea. Found to have PNA. Pt reports she walks with a Rwx but mobility has been limited since fall in June. She has had LLE pain since the fall. Pt presents with limited ROM in  LLE and R shoulder, generalized weakness, imapired balane and difficulty walking. Pt would benefit from PT to address these impairments. She hopees to d/c home with her son, but may need rehab prior to returning home. Pt was able to sit EOB and complete a few LE exercises. She stood briefly , but was unable to stand fully upright secondary to back pain.  -     Row Name 11/05/22 1800          Therapy Assessment/Plan (PT)    Patient/Family Therapy Goals Statement (PT) return to [facility] and return home with son  -     Rehab Potential (PT) good, to achieve stated therapy goals  -     Criteria for Skilled Interventions Met (PT) yes  -     Therapy Frequency (PT) 6 times/wk  -     Row Name 11/05/22 1800          Positioning and Restraints    Pre-Treatment Position in bed  -     Post Treatment Position bed  -     In Bed fowlers;call light within reach;encouraged to call for assist;exit alarm on  -           User Key  (r) = Recorded By, (t) = Taken By, (c) = Cosigned By    Initials Name Provider Type    YARELY  Isaura Naidu, PT Physical Therapist               Outcome Measures     Row Name 11/05/22 1808          How much help from another person do you currently need...    Turning from your back to your side while in flat bed without using bedrails? 3  -KH     Moving from lying on back to sitting on the side of a flat bed without bedrails? 3  -KH     Moving to and from a bed to a chair (including a wheelchair)? 2  -KH     Standing up from a chair using your arms (e.g., wheelchair, bedside chair)? 2  -KH     Climbing 3-5 steps with a railing? 1  -KH     To walk in hospital room? 1  -KH     AM-PAC 6 Clicks Score (PT) 12  -KH     Highest level of mobility 4 --> Transferred to chair/commode  -     Row Name 11/05/22 1808          Functional Assessment    Outcome Measure Options AM-PAC 6 Clicks Basic Mobility (PT)  -           User Key  (r) = Recorded By, (t) = Taken By, (c) = Cosigned By    Initials Name Provider Type     Isaura Naidu, PT Physical Therapist                             Physical Therapy Education     Title: PT OT SLP Therapies (In Progress)     Topic: Physical Therapy (Done)     Point: Mobility training (Done)     Learning Progress Summary           Patient Acceptance, E, VU,NR by  at 11/5/2022 1809                   Point: Home exercise program (Done)     Learning Progress Summary           Patient Acceptance, E, VU,NR by  at 11/5/2022 1809                   Point: Body mechanics (Done)     Learning Progress Summary           Patient Acceptance, E, VU,NR by  at 11/5/2022 1809                   Point: Precautions (Done)     Learning Progress Summary           Patient Acceptance, E, VU,NR by  at 11/5/2022 1809                               User Key     Initials Effective Dates Name Provider Type Discipline     06/16/21 -  Isaura Naidu, PT Physical Therapist PT              PT Recommendation and Plan  Planned Therapy Interventions (PT): balance training, bed mobility  training, gait training, home exercise program, ROM (range of motion), transfer training, strengthening, patient/family education  Plan of Care Reviewed With: patient  Outcome Evaluation: Pt admitted from home with AMS and dirrhea. Found to have PNA. Pt reports she walks with a Rwx but mobility has been limited since fall in June. She has had LLE pain since the fall. Pt presents with limited ROM in  LLE and R shoulder, generalized weakness, imapired balane and difficulty walking. Pt would benefit from PT to address these impairments. She hopees to d/c home with her son, but may need rehab prior to returning home. Pt was able to sit EOB and complete a few LE exercises. She stood briefly , but was unable to stand fully upright secondary to back pain.     Time Calculation:    PT Charges     Row Name 11/05/22 1809             Time Calculation    Start Time 1510  -KH      Stop Time 1523  -KH      Time Calculation (min) 13 min  -KH      PT Received On 11/05/22  -KH      PT - Next Appointment 11/07/22  -      PT Goal Re-Cert Due Date 11/12/22  -KH         Untimed Charges    PT Eval/Re-eval Minutes 13  -KH         Total Minutes    Untimed Charges Total Minutes 13  -KH       Total Minutes 13  -KH            User Key  (r) = Recorded By, (t) = Taken By, (c) = Cosigned By    Initials Name Provider Type    Isaura Strickland, MELODIE Physical Therapist              Therapy Charges for Today     Code Description Service Date Service Provider Modifiers Qty    [id number] HC PT EVAL MOD COMPLEXITY 2 11/5/2022 Isaura Naidu, PT GP 1          PT G-Codes  Outcome Measure Options: AM-PAC 6 Clicks Basic Mobility (PT)  AM-PAC 6 Clicks Score (PT): 12    Isaura Naidu PT  11/5/2022

## 2022-11-06 ENCOUNTER — APPOINTMENT (OUTPATIENT)
Dept: CARDIOLOGY | Facility: HOSPITAL | Age: 82
End: 2022-11-06

## 2022-11-06 ENCOUNTER — APPOINTMENT (OUTPATIENT)
Dept: GENERAL RADIOLOGY | Facility: HOSPITAL | Age: 82
End: 2022-11-06

## 2022-11-06 LAB
GLUCOSE BLDC GLUCOMTR-MCNC: 152 MG/DL (ref 70–130)
GLUCOSE BLDC GLUCOMTR-MCNC: 244 MG/DL (ref 70–130)
GLUCOSE BLDC GLUCOMTR-MCNC: 282 MG/DL (ref 70–130)
GLUCOSE BLDC GLUCOMTR-MCNC: 77 MG/DL (ref 70–130)

## 2022-11-06 PROCEDURE — 25010000002 CEFTRIAXONE PER 250 MG: Performed by: NURSE PRACTITIONER

## 2022-11-06 PROCEDURE — 82962 GLUCOSE BLOOD TEST: CPT

## 2022-11-06 PROCEDURE — 25010000002 ENOXAPARIN PER 10 MG: Performed by: NURSE PRACTITIONER

## 2022-11-06 PROCEDURE — 93971 EXTREMITY STUDY: CPT

## 2022-11-06 PROCEDURE — 72114 X-RAY EXAM L-S SPINE BENDING: CPT

## 2022-11-06 PROCEDURE — 63710000001 INSULIN LISPRO (HUMAN) PER 5 UNITS: Performed by: HOSPITALIST

## 2022-11-06 PROCEDURE — 73560 X-RAY EXAM OF KNEE 1 OR 2: CPT

## 2022-11-06 RX ORDER — CEFDINIR 300 MG/1
300 CAPSULE ORAL EVERY 12 HOURS SCHEDULED
Status: COMPLETED | OUTPATIENT
Start: 2022-11-07 | End: 2022-11-10

## 2022-11-06 RX ORDER — INSULIN LISPRO 100 [IU]/ML
8 INJECTION, SOLUTION INTRAVENOUS; SUBCUTANEOUS
Status: DISCONTINUED | OUTPATIENT
Start: 2022-11-06 | End: 2022-11-11 | Stop reason: HOSPADM

## 2022-11-06 RX ADMIN — HYDROCODONE BITARTRATE AND ACETAMINOPHEN 1 TABLET: 5; 325 TABLET ORAL at 22:05

## 2022-11-06 RX ADMIN — INSULIN LISPRO 2 UNITS: 100 INJECTION, SOLUTION INTRAVENOUS; SUBCUTANEOUS at 08:41

## 2022-11-06 RX ADMIN — ATORVASTATIN CALCIUM 80 MG: 20 TABLET, FILM COATED ORAL at 22:05

## 2022-11-06 RX ADMIN — CHLORTHALIDONE 25 MG: 25 TABLET ORAL at 08:42

## 2022-11-06 RX ADMIN — HYDROCODONE BITARTRATE AND ACETAMINOPHEN 1 TABLET: 5; 325 TABLET ORAL at 16:30

## 2022-11-06 RX ADMIN — DULOXETINE HYDROCHLORIDE 30 MG: 30 CAPSULE, DELAYED RELEASE ORAL at 08:42

## 2022-11-06 RX ADMIN — INSULIN LISPRO 3 UNITS: 100 INJECTION, SOLUTION INTRAVENOUS; SUBCUTANEOUS at 13:07

## 2022-11-06 RX ADMIN — PANTOPRAZOLE SODIUM 40 MG: 40 TABLET, DELAYED RELEASE ORAL at 06:45

## 2022-11-06 RX ADMIN — Medication 10 ML: at 08:42

## 2022-11-06 RX ADMIN — ENOXAPARIN SODIUM 40 MG: 100 INJECTION SUBCUTANEOUS at 08:41

## 2022-11-06 RX ADMIN — INSULIN GLARGINE-YFGN 40 UNITS: 100 INJECTION, SOLUTION SUBCUTANEOUS at 22:14

## 2022-11-06 RX ADMIN — INSULIN LISPRO 4 UNITS: 100 INJECTION, SOLUTION INTRAVENOUS; SUBCUTANEOUS at 22:14

## 2022-11-06 RX ADMIN — PREGABALIN 50 MG: 50 CAPSULE ORAL at 22:06

## 2022-11-06 RX ADMIN — INSULIN LISPRO 5 UNITS: 100 INJECTION, SOLUTION INTRAVENOUS; SUBCUTANEOUS at 08:40

## 2022-11-06 RX ADMIN — ASPIRIN 81 MG: 81 TABLET, COATED ORAL at 08:42

## 2022-11-06 RX ADMIN — INSULIN LISPRO 8 UNITS: 100 INJECTION, SOLUTION INTRAVENOUS; SUBCUTANEOUS at 13:07

## 2022-11-06 RX ADMIN — LISINOPRIL 10 MG: 10 TABLET ORAL at 08:42

## 2022-11-06 RX ADMIN — PREGABALIN 50 MG: 50 CAPSULE ORAL at 08:42

## 2022-11-06 RX ADMIN — PREGABALIN 50 MG: 50 CAPSULE ORAL at 16:30

## 2022-11-06 RX ADMIN — Medication 10 ML: at 22:06

## 2022-11-06 RX ADMIN — HYDROCODONE BITARTRATE AND ACETAMINOPHEN 1 TABLET: 5; 325 TABLET ORAL at 08:42

## 2022-11-06 RX ADMIN — CEFTRIAXONE SODIUM 1 G: 1 INJECTION, POWDER, FOR SOLUTION INTRAMUSCULAR; INTRAVENOUS at 01:46

## 2022-11-06 RX ADMIN — BISOPROLOL FUMARATE 5 MG: 5 TABLET, FILM COATED ORAL at 08:42

## 2022-11-06 RX ADMIN — LEVOTHYROXINE SODIUM 112 MCG: 0.11 TABLET ORAL at 08:42

## 2022-11-06 RX ADMIN — Medication 1000 MCG: at 08:42

## 2022-11-06 RX ADMIN — AMLODIPINE BESYLATE 10 MG: 10 TABLET ORAL at 08:42

## 2022-11-06 NOTE — PLAN OF CARE
Goal Outcome Evaluation:  Plan of Care Reviewed With: patient        Progress: improving  A&o X4, scheduled pain meds, up with cane and assist, IV antibiotics continues, IV fluids D/C'ed, q2 turns, possible D/C in am, will CTM

## 2022-11-06 NOTE — PROGRESS NOTES
Name: Halie Guidry ADMIT: 11/3/2022   : 1940  PCP: Napoleon Mead MD    MRN: [id number] LOS: 2 days   AGE/SEX: 82 y.o. female  ROOM: [facility]     Subjective   Subjective    Complaining of chronic left lower extremity shin pain since  when she had a fall. Denies chest pain, shortness of breath, cough, abdominal pain. She worked with physical therapy and they felt she might need rehab.    Review of Systems   Constitutional: Negative for fever.   Respiratory: Negative for cough and shortness of breath.    Cardiovascular: Negative for chest pain.   Gastrointestinal: Negative for abdominal pain, diarrhea, nausea and vomiting.   Genitourinary: Negative for dysuria.   Musculoskeletal: Positive for arthralgias.   Neurological: Negative for headaches.      Objective   Objective   Vital Signs  Temp:  [97.7 °F (36.5 °C)-98.2 °F (36.8 °C)] 98.2 °F (36.8 °C)  Heart Rate:  [60-77] 60  Resp:  [18] 18  BP: (147-168)/(66-80) 147/66  SpO2:  [96 %] 96 %  on   ;   Device (Oxygen Therapy): room air  Body mass index is 31.41 kg/m².    Physical Exam  Constitutional:       General: She is not in acute distress.     Appearance: Normal appearance. She is not toxic-appearing.   HENT:      Head: Normocephalic and atraumatic.   Cardiovascular:      Rate and Rhythm: Normal rate and regular rhythm.   Pulmonary:      Effort: Pulmonary effort is normal. No respiratory distress.      Breath sounds: Normal breath sounds. No wheezing, rhonchi or rales.   Abdominal:      General: Bowel sounds are normal.      Palpations: Abdomen is soft.      Tenderness: There is no abdominal tenderness. There is no guarding or rebound.   Musculoskeletal:         General: No swelling.      Right lower leg: No edema.      Left lower leg: No edema.      Comments: left anterior lower leg tender. Skin is warm. No erythema or swelling.   Skin:     General: Skin is warm and dry.      Coloration: Skin is not cyanotic or mottled.      Findings: No  erythema.   Neurological:      General: No focal deficit present.      Mental Status: She is alert and oriented to person, place, and time.   Psychiatric:         Mood and Affect: Mood normal.         Behavior: Behavior normal.         Thought Content: Thought content normal.     Results Review  I reviewed the patient's new clinical results.  Results from last 7 days   Lab Units 11/05/22  0643 11/04/22  0801 11/03/22  2311   WBC 10*3/mm3 8.24 10.03 11.81*   HEMOGLOBIN g/dL 11.4* 12.2 12.6   PLATELETS 10*3/mm3 278 286 355     Results from last 7 days   Lab Units 11/05/22  0643 11/04/22  0801 11/03/22  2311   SODIUM mmol/L 141 138 135*   POTASSIUM mmol/L 4.3 4.4 4.9   CHLORIDE mmol/L 108* 105 103   CO2 mmol/L 22.3 17.2* 20.7*   BUN mg/dL 40* 67* 77*   CREATININE mg/dL 0.88 1.32* 1.69*   GLUCOSE mg/dL 220* 234* 341*     Lab Results   Component Value Date    ANIONGAP 10.7 11/05/2022     Estimated Creatinine Clearance: 59 mL/min (by C-G formula based on SCr of 0.88 mg/dL).    Results from last 7 days   Lab Units 11/03/22  2311   ALBUMIN g/dL 4.20   BILIRUBIN mg/dL 0.3   ALK PHOS U/L 110   AST (SGOT) U/L 13   ALT (SGPT) U/L 10     Results from last 7 days   Lab Units 11/05/22  0643 11/04/22  0801 11/03/22  2311   CALCIUM mg/dL 9.6 8.9 9.1   ALBUMIN g/dL  --   --  4.20   MAGNESIUM mg/dL  --   --  2.4     Results from last 7 days   Lab Units 11/03/22  2327 11/03/22  2311   PROCALCITONIN ng/mL  --  0.06   LACTATE mmol/L 1.8  --      Glucose   Date/Time Value Ref Range Status   11/06/2022 0554 152 (H) 70 - 130 mg/dL Final     Comment:     Meter: [id number] : [id number] Steffanie Vazquez MARY   11/05/2022 2135 324 (H) 70 - 130 mg/dL Final     Comment:     Meter: [id number] : [id number] Steffanie HERNANDEZ   11/05/2022 1601 225 (H) 70 - 130 mg/dL Final     Comment:     Meter: [id number] : [id number] Marlon HERNANDEZ   11/05/2022 1048 302 (H) 70 - 130 mg/dL Final     Comment:     Meter: [id number] : [id number] Marlon Price  NA   11/05/2022 0538 213 (H) 70 - 130 mg/dL Final     Comment:     RN Notified R and V Meter: [id number] : [id number] Jonathan Fong NA   11/04/2022 2114 198 (H) 70 - 130 mg/dL Final     Comment:     Meter: [id number] : carrie Archer RN   11/04/2022 1650 275 (H) 70 - 130 mg/dL Final     Comment:     Meter: [id number] : [id number] Eber Hargrove MARY       No radiology results for the last day    Scheduled Meds  amLODIPine, 10 mg, Oral, Q24H  aspirin, 81 mg, Oral, Daily  atorvastatin, 80 mg, Oral, Nightly  bisoprolol, 5 mg, Oral, Daily  cefTRIAXone, 1 g, Intravenous, Q24H  chlorthalidone, 25 mg, Oral, Daily  DULoxetine, 30 mg, Oral, Daily  enoxaparin, 40 mg, Subcutaneous, Daily  HYDROcodone-acetaminophen, 1 tablet, Oral, TID  insulin glargine, 40 Units, Subcutaneous, Nightly  insulin lispro, 0-7 Units, Subcutaneous, 4x Daily With Meals & Nightly  insulin lispro, 5 Units, Subcutaneous, TID With Meals  levothyroxine, 112 mcg, Oral, Daily  lisinopril, 10 mg, Oral, Q24H  pantoprazole, 40 mg, Oral, QAM  pregabalin, 50 mg, Oral, TID  sodium chloride, 10 mL, Intravenous, Q12H  cyanocobalamin, 1,000 mcg, Oral, Daily    Continuous Infusions   PRN Meds  •  dextrose  •  dextrose  •  glucagon (human recombinant)  •  sodium chloride  •  [COMPLETED] Insert peripheral IV **AND** sodium chloride  •  sodium chloride     Diet  Diet Regular; Cardiac, Consistent Carbohydrate, Renal    I have personally reviewed:  [x]  Laboratory   [x]  Microbiology   [x]  Radiology  no fractures  [x]  EKG/Telemetry SR on telemetry  []  Cardiology/Vascular   []  Pathology   []  Records     Assessment/Plan     Active Hospital Problems    Diagnosis  POA   • **Left lower lobe pneumonia [J18.9]  Unknown   • Diarrhea [R19.7]  Unknown   • Acute metabolic encephalopathy [G93.41]  Yes   • VIPUL (acute kidney injury) (HCC) [N17.9]  Yes   • Type 2 diabetes mellitus, with long-term current use of insulin (HCC) [E11.9, Z79.4]  Not Applicable    • Benign essential hypertension [I10]  Yes   • Stage 3a chronic kidney disease (HCC) [N18.31]  Yes      Resolved Hospital Problems   No resolved problems to display.     82 y.o. female with history of hypertension, CKD 3, diabetes mellitus type 2 admitted with metabolic encephalopathy due to left lower lobe pneumonia. She is also had diarrhea and has VIPUL. Diarrhea has resolved     Left lower lobe pneumonia on CXR  -Patient however minimal/no respiratory symptoms  -Legionella strep pneumonia MRSA and blood cultures negative so far and respiratory panel negative  -Lactate and procalcitonin not elevated. WBC normalized.  -Continue short course of antibiotics to treat pneumonia will switch to PO     Left lower leg pain  -This is since a fall in June  -X-rays negative and exam is unremarkable.  -Check Dopplers and ask Ortho to see    Diarrhea  -Resolved may be associated with some pneumonia   -If recurs check stool PCR     VIPUL/CKD 3 with probably metabolic acidosis, hypertension  -Likely due to diarrhea causing volume depletion  -Treated with fluids and creatinine normalized  -Had been holding diuretic and ACE inhibitor but resumed 11/5/2022 (ACE inhibitor lower dose than normal however)  -Continue to monitor renal function     Metabolic encephalopathy  -She has a history of metabolic encephalopathy in the past  -This admission due to above probably more renal failure than pneumonia  -CT head negative  -Confusion has resolved      Hypothyroidism  -Levothyroxine     DM2 with neuropathy  -A1c 7.70% in September  -Increase mealtime insulin. Continue correctional and long-acting.  -She has had a history of requiring less insulin in the hospital monitor for hypoglycemia    Lovenox 40 mg SC daily for DVT prophylaxis    Discussed with patient and nursing staff    Discharge: Might need SNF versus home with son and home health. Med surg in the [place]    Efe Loc MD Aurelia  [facility]  11/06/22

## 2022-11-06 NOTE — PLAN OF CARE
Goal Outcome Evaluation:  Plan of Care Reviewed With: patient        Progress: improving  Outcome Evaluation: Patient transferred from . A&O, c/o chronic back pain, scheduled PO pain medication given. Buttocks & periarea reddened. Purewick removed, encouraged patient to use BSC and let us know when she needs to use the restroom. Barrier cream applied & brief changed. Small BM in brief. Bed alarm in use. LLE doppler & knee/lumbar spine XR done prior to transfer. Accu-check 77, NCS diet.

## 2022-11-07 LAB
ANION GAP SERPL CALCULATED.3IONS-SCNC: 11.7 MMOL/L (ref 5–15)
BH CV LOWER VASCULAR LEFT COMMON FEMORAL AUGMENT: NORMAL
BH CV LOWER VASCULAR LEFT COMMON FEMORAL COMPETENT: NORMAL
BH CV LOWER VASCULAR LEFT COMMON FEMORAL COMPRESS: NORMAL
BH CV LOWER VASCULAR LEFT COMMON FEMORAL PHASIC: NORMAL
BH CV LOWER VASCULAR LEFT COMMON FEMORAL SPONT: NORMAL
BH CV LOWER VASCULAR LEFT GASTRONEMIUS COMPRESS: NORMAL
BH CV LOWER VASCULAR LEFT GREATER SAPH AK COMPRESS: NORMAL
BH CV LOWER VASCULAR LEFT GREATER SAPH BK COMPRESS: NORMAL
BH CV LOWER VASCULAR LEFT LESSER SAPH COMPRESS: NORMAL
BH CV LOWER VASCULAR LEFT MID FEMORAL AUGMENT: NORMAL
BH CV LOWER VASCULAR LEFT MID FEMORAL COMPETENT: NORMAL
BH CV LOWER VASCULAR LEFT MID FEMORAL PHASIC: NORMAL
BH CV LOWER VASCULAR LEFT MID FEMORAL SPONT: NORMAL
BH CV LOWER VASCULAR LEFT PERONEAL COMPRESS: NORMAL
BH CV LOWER VASCULAR LEFT POPLITEAL AUGMENT: NORMAL
BH CV LOWER VASCULAR LEFT POPLITEAL COMPETENT: NORMAL
BH CV LOWER VASCULAR LEFT POPLITEAL COMPRESS: NORMAL
BH CV LOWER VASCULAR LEFT POPLITEAL PHASIC: NORMAL
BH CV LOWER VASCULAR LEFT POPLITEAL SPONT: NORMAL
BH CV LOWER VASCULAR LEFT POSTERIOR TIBIAL COMPRESS: NORMAL
BH CV LOWER VASCULAR LEFT SAPHENOFEMORAL JUNCTION COMPRESS: NORMAL
BH CV LOWER VASCULAR RIGHT COMMON FEMORAL AUGMENT: NORMAL
BH CV LOWER VASCULAR RIGHT COMMON FEMORAL COMPETENT: NORMAL
BH CV LOWER VASCULAR RIGHT COMMON FEMORAL COMPRESS: NORMAL
BH CV LOWER VASCULAR RIGHT COMMON FEMORAL PHASIC: NORMAL
BH CV LOWER VASCULAR RIGHT COMMON FEMORAL SPONT: NORMAL
BUN SERPL-MCNC: 23 MG/DL (ref 8–23)
BUN/CREAT SERPL: 21.3 (ref 7–25)
CALCIUM SPEC-SCNC: 9.1 MG/DL (ref 8.6–10.5)
CHLORIDE SERPL-SCNC: 102 MMOL/L (ref 98–107)
CO2 SERPL-SCNC: 25.3 MMOL/L (ref 22–29)
CREAT SERPL-MCNC: 1.08 MG/DL (ref 0.57–1)
DEPRECATED RDW RBC AUTO: 45.4 FL (ref 37–54)
EGFRCR SERPLBLD CKD-EPI 2021: 51.4 ML/MIN/1.73
ERYTHROCYTE [DISTWIDTH] IN BLOOD BY AUTOMATED COUNT: 14.4 % (ref 12.3–15.4)
GLUCOSE BLDC GLUCOMTR-MCNC: 155 MG/DL (ref 70–130)
GLUCOSE BLDC GLUCOMTR-MCNC: 156 MG/DL (ref 70–130)
GLUCOSE BLDC GLUCOMTR-MCNC: 175 MG/DL (ref 70–130)
GLUCOSE BLDC GLUCOMTR-MCNC: 205 MG/DL (ref 70–130)
GLUCOSE SERPL-MCNC: 196 MG/DL (ref 65–99)
HCT VFR BLD AUTO: 36.5 % (ref 34–46.6)
HGB BLD-MCNC: 12 G/DL (ref 12–15.9)
MAXIMAL PREDICTED HEART RATE: 138 BPM
MCH RBC QN AUTO: 28.4 PG (ref 26.6–33)
MCHC RBC AUTO-ENTMCNC: 32.9 G/DL (ref 31.5–35.7)
MCV RBC AUTO: 86.3 FL (ref 79–97)
PLATELET # BLD AUTO: 289 10*3/MM3 (ref 140–450)
PMV BLD AUTO: 9.1 FL (ref 6–12)
POTASSIUM SERPL-SCNC: 3.7 MMOL/L (ref 3.5–5.2)
RBC # BLD AUTO: 4.23 10*6/MM3 (ref 3.77–5.28)
SODIUM SERPL-SCNC: 139 MMOL/L (ref 136–145)
STRESS TARGET HR: 117 BPM
WBC NRBC COR # BLD: 10.49 10*3/MM3 (ref 3.4–10.8)

## 2022-11-07 PROCEDURE — 25010000002 ENOXAPARIN PER 10 MG: Performed by: HOSPITALIST

## 2022-11-07 PROCEDURE — 97166 OT EVAL MOD COMPLEX 45 MIN: CPT

## 2022-11-07 PROCEDURE — 63710000001 INSULIN LISPRO (HUMAN) PER 5 UNITS: Performed by: HOSPITALIST

## 2022-11-07 PROCEDURE — 97530 THERAPEUTIC ACTIVITIES: CPT

## 2022-11-07 PROCEDURE — 82962 GLUCOSE BLOOD TEST: CPT

## 2022-11-07 PROCEDURE — 80048 BASIC METABOLIC PNL TOTAL CA: CPT | Performed by: HOSPITALIST

## 2022-11-07 PROCEDURE — 97116 GAIT TRAINING THERAPY: CPT

## 2022-11-07 PROCEDURE — 85027 COMPLETE CBC AUTOMATED: CPT | Performed by: HOSPITALIST

## 2022-11-07 PROCEDURE — 97110 THERAPEUTIC EXERCISES: CPT

## 2022-11-07 RX ORDER — LIDOCAINE 50 MG/G
1 PATCH TOPICAL
Status: DISCONTINUED | OUTPATIENT
Start: 2022-11-07 | End: 2022-11-11 | Stop reason: HOSPADM

## 2022-11-07 RX ORDER — UREA 10 %
3 LOTION (ML) TOPICAL NIGHTLY
Status: DISCONTINUED | OUTPATIENT
Start: 2022-11-07 | End: 2022-11-11 | Stop reason: HOSPADM

## 2022-11-07 RX ORDER — ACETAMINOPHEN 325 MG/1
650 TABLET ORAL EVERY 6 HOURS PRN
Status: DISCONTINUED | OUTPATIENT
Start: 2022-11-07 | End: 2022-11-11 | Stop reason: HOSPADM

## 2022-11-07 RX ADMIN — INSULIN LISPRO 8 UNITS: 100 INJECTION, SOLUTION INTRAVENOUS; SUBCUTANEOUS at 12:14

## 2022-11-07 RX ADMIN — LEVOTHYROXINE SODIUM 112 MCG: 0.11 TABLET ORAL at 06:13

## 2022-11-07 RX ADMIN — PANTOPRAZOLE SODIUM 40 MG: 40 TABLET, DELAYED RELEASE ORAL at 06:13

## 2022-11-07 RX ADMIN — INSULIN GLARGINE-YFGN 40 UNITS: 100 INJECTION, SOLUTION SUBCUTANEOUS at 21:32

## 2022-11-07 RX ADMIN — AMLODIPINE BESYLATE 10 MG: 10 TABLET ORAL at 08:39

## 2022-11-07 RX ADMIN — HYDROCODONE BITARTRATE AND ACETAMINOPHEN 1 TABLET: 5; 325 TABLET ORAL at 08:41

## 2022-11-07 RX ADMIN — BISOPROLOL FUMARATE 5 MG: 5 TABLET, FILM COATED ORAL at 08:39

## 2022-11-07 RX ADMIN — INSULIN LISPRO 3 UNITS: 100 INJECTION, SOLUTION INTRAVENOUS; SUBCUTANEOUS at 12:13

## 2022-11-07 RX ADMIN — CHLORTHALIDONE 25 MG: 25 TABLET ORAL at 08:39

## 2022-11-07 RX ADMIN — PREGABALIN 50 MG: 50 CAPSULE ORAL at 08:41

## 2022-11-07 RX ADMIN — INSULIN LISPRO 2 UNITS: 100 INJECTION, SOLUTION INTRAVENOUS; SUBCUTANEOUS at 21:32

## 2022-11-07 RX ADMIN — INSULIN LISPRO 2 UNITS: 100 INJECTION, SOLUTION INTRAVENOUS; SUBCUTANEOUS at 17:50

## 2022-11-07 RX ADMIN — ATORVASTATIN CALCIUM 80 MG: 20 TABLET, FILM COATED ORAL at 21:23

## 2022-11-07 RX ADMIN — ENOXAPARIN SODIUM 40 MG: 100 INJECTION SUBCUTANEOUS at 08:41

## 2022-11-07 RX ADMIN — INSULIN LISPRO 8 UNITS: 100 INJECTION, SOLUTION INTRAVENOUS; SUBCUTANEOUS at 08:42

## 2022-11-07 RX ADMIN — PREGABALIN 50 MG: 50 CAPSULE ORAL at 21:23

## 2022-11-07 RX ADMIN — CEFDINIR 300 MG: 300 CAPSULE ORAL at 21:23

## 2022-11-07 RX ADMIN — INSULIN LISPRO 8 UNITS: 100 INJECTION, SOLUTION INTRAVENOUS; SUBCUTANEOUS at 17:49

## 2022-11-07 RX ADMIN — HYDROCODONE BITARTRATE AND ACETAMINOPHEN 1 TABLET: 5; 325 TABLET ORAL at 17:39

## 2022-11-07 RX ADMIN — HYDROCODONE BITARTRATE AND ACETAMINOPHEN 1 TABLET: 5; 325 TABLET ORAL at 21:23

## 2022-11-07 RX ADMIN — CEFDINIR 300 MG: 300 CAPSULE ORAL at 08:40

## 2022-11-07 RX ADMIN — INSULIN LISPRO 2 UNITS: 100 INJECTION, SOLUTION INTRAVENOUS; SUBCUTANEOUS at 08:44

## 2022-11-07 RX ADMIN — LIDOCAINE 1 PATCH: 700 PATCH TOPICAL at 12:14

## 2022-11-07 RX ADMIN — DULOXETINE HYDROCHLORIDE 30 MG: 30 CAPSULE, DELAYED RELEASE ORAL at 08:40

## 2022-11-07 RX ADMIN — LISINOPRIL 10 MG: 10 TABLET ORAL at 08:39

## 2022-11-07 RX ADMIN — Medication 1000 MCG: at 08:40

## 2022-11-07 RX ADMIN — ACETAMINOPHEN 650 MG: 325 TABLET, FILM COATED ORAL at 13:59

## 2022-11-07 RX ADMIN — ASPIRIN 81 MG: 81 TABLET, COATED ORAL at 08:41

## 2022-11-07 RX ADMIN — PREGABALIN 50 MG: 50 CAPSULE ORAL at 17:39

## 2022-11-07 RX ADMIN — Medication 3 MG: at 21:23

## 2022-11-07 NOTE — DISCHARGE PLACEMENT REQUEST
"Tabatha Guidry (82 y.o. Female)     Date of Birth   1940    Social Security Number       Address   [address] [place] [address] [address] [place] [address]    Home Phone   [phone]    MRN   [id number]       Sabianist   Pentecostal    Marital Status                               Admission Date   11/3/22    Admission Type   Emergency    Admitting Provider   Kelechi Sanchez MD    Attending Provider   Nolan Case MD    Department, Room/Bed   [address], P686/1       Discharge Date       Discharge Disposition       Discharge Destination                               Attending Provider: Nolan Case MD    Allergies: Sulfa Antibiotics    Isolation: None   Infection: None   Code Status: No CPR    Ht: 176.8 cm (69.6\")   Wt: 72.6 kg (160 lb)    Admission Cmt: None   Principal Problem: Left lower lobe pneumonia [J18.9]                 Active Insurance as of 11/3/2022     Primary Coverage     Payor Plan Insurance Group Employer/Plan Group    [facility] MEDICARE REPLACEMENT [facility] MEDICARE REPLACEMENT [id number]     Payor Plan Address Payor Plan Phone Number Payor Plan Fax Number Effective Dates    [address] [address]   1/1/2016 - None Entered    [facility] [id number]       Subscriber Name Subscriber Birth Date Member ID       TABATHA GUIDRY 1940 [id number]                 Emergency Contacts      (Rel.) Home Phone Work Phone Mobile Phone    Derrick Guidry (Son) [phone] -- [phone]    Josse Guidry (Son) [phone] -- [phone]            "

## 2022-11-07 NOTE — PROGRESS NOTES
Continued Stay Note  [facility]     Patient Name: Halie Guidry  MRN: [id number]  Today's Date: 11/7/2022    Admit Date: 11/3/2022    Plan: Rehab ([facility]/Pending)   Discharge Plan     Row Name 11/07/22 1610       Plan    Plan Rehab ([facility]erral [facility]/Pending)    Plan Comments Met with patient who confirmed DC plan has now changed for rehab. Requested [place] [facility]. Referral placed in [facility]. Msg sent to Mame with [facility] regarding referral.               Discharge Codes    No documentation.                     Vannessa Mcdowell, RN

## 2022-11-07 NOTE — CONSULTS
Orthopaedic Consultation      Patient: Halie Guidry    Date of Admission: 11/3/2022 10:46 PM    YOB: 1940    Medical Record Number: [id number]    Attending Physician:  Nolan Case MD    Consulting Physician:  Dionisio Pryor PA-C        Chief Complaints: Left shin pain    History of Present Illness:     The patient is a pleasant 82-year-old female.  Initially admitted to [facility] on November 4.  She was brought to the hospital because of altered mental status, hyperglycemia and diarrhea.  During her hospital course she was found to have left lower lobe pneumonia and started on antibiotics.  Patient states that she has had chronic low back pain.  As well as left shin pain ever since a fall back in June.  X-rays were obtained in the emergency room.  This revealed no evidence of fracture.  This was shown to have degenerative changes of her left knee.  She subsequently has been evaluated by physical therapy in the hospital.  They have felt that she would be needing rehab placement.  Due to the left shin pain orthopedics has been consulted for further evaluation.  X-rays of the left knee, left tibia and fibula and left ankle have been performed in the hospital.  This revealed no fractures.  This revealed degenerative changes of the left knee.  Dopplers have been ordered.  Lovenox currently for DVT prophylaxis 40 mg subcu daily.      Allergies:   Allergies   Allergen Reactions   • Sulfa Antibiotics Unknown - High Severity     Pt says it was a long time ago and I don't remember but it wasn't good.        Medications:   Home Medications:  Medications Prior to Admission   Medication Sig Dispense Refill Last Dose   • amLODIPine (NORVASC) 10 MG tablet Take 1 tablet by mouth Daily. 90 tablet 1 Past Week   • aspirin 81 MG EC tablet Take 1 tablet by mouth Daily.   Past Week   • atorvastatin (LIPITOR) 80 MG tablet TAKE 1 TABLET EVERY NIGHT 90 tablet 3 Past Week   • BD Pen Needle Naila  2nd Gen 32G X 4 MM misc USE TO INJECT INSULIN FOUR TIMES DAILY 200 each 0 Past Week   • bisoprolol (ZEBeta) 5 MG tablet Take 1 tablet by mouth Daily. 90 tablet 1 Past Week   • chlorthalidone (HYGROTON) 25 MG tablet Take 1 tablet by mouth Daily. 90 tablet 1 Past Week   • Dulaglutide (Trulicity) 1.5 MG/0.5ML solution pen-injector Inject 1.5 mg under the skin into the appropriate area as directed 1 (One) Time Per Week. sunday 6 mL 3 Past Week   • DULoxetine (CYMBALTA) 30 MG capsule Take 1 capsule by mouth Daily. 90 capsule 1 Past Week   • famotidine (PEPCID) 20 MG tablet Take 1 tablet by mouth Daily Before Supper.   Past Week   • HYDROcodone-acetaminophen (NORCO) 5-325 MG per tablet Take 1 tablet by mouth Every 6 (Six) Hours As Needed for Moderate Pain . 15 tablet 0 Past Week   • Insulin Glargine, 2 Unit Dial, (TOUJEO) 300 UNIT/ML solution pen-injector injection Inject 60 Units under the skin into the appropriate area as directed Every Night.   Past Week   • Insulin Lispro, 1 Unit Dial, (HUMALOG) 100 UNIT/ML solution pen-injector Inject 6 Units under the skin into the appropriate area as directed 3 (Three) Times a Day With Meals. (Patient taking differently: Inject 6 Units under the skin into the appropriate area as directed 3 (Three) Times a Day With Meals. Pt reports she only uses if blood sugar is greater than 150 and then she uses a sliding scale) 15 pen 3 Past Week   • lansoprazole (PREVACID) 15 MG capsule Take 15 mg by mouth Daily.   Past Week   • levothyroxine (SYNTHROID, LEVOTHROID) 112 MCG tablet TAKE 1 TABLET DAILY 90 tablet 3 Past Week   • lisinopril (PRINIVIL,ZESTRIL) 40 MG tablet Take 1 tablet by mouth Daily. 90 tablet 1 Past Week   • pregabalin (LYRICA) 50 MG capsule Take 1 capsule by mouth 3 (Three) Times a Day. 21 capsule 1 Past Week   • vitamin B-12 (VITAMIN B-12) 1000 MCG tablet Take 1 tablet by mouth Daily.   Past Week   • acetaminophen (TYLENOL) 325 MG tablet Take 2 tablets by mouth Every 6 (Six)  Hours As Needed for Mild Pain .      • hydrocortisone-zinc oxide-bacitracin-nystatin cream Apply 1 application topically to the appropriate area as directed 2 (Two) Times a Day As Needed (rash). 5 g 0        Current Medications:  Scheduled Meds:amLODIPine, 10 mg, Oral, Q24H  aspirin, 81 mg, Oral, Daily  atorvastatin, 80 mg, Oral, Nightly  bisoprolol, 5 mg, Oral, Daily  cefdinir, 300 mg, Oral, Q12H  chlorthalidone, 25 mg, Oral, Daily  DULoxetine, 30 mg, Oral, Daily  enoxaparin, 40 mg, Subcutaneous, Daily  HYDROcodone-acetaminophen, 1 tablet, Oral, TID  insulin glargine, 40 Units, Subcutaneous, Nightly  insulin lispro, 0-7 Units, Subcutaneous, 4x Daily With Meals & Nightly  insulin lispro, 8 Units, Subcutaneous, TID With Meals  levothyroxine, 112 mcg, Oral, Daily  lisinopril, 10 mg, Oral, Q24H  pantoprazole, 40 mg, Oral, QAM  pregabalin, 50 mg, Oral, TID  sodium chloride, 10 mL, Intravenous, Q12H  cyanocobalamin, 1,000 mcg, Oral, Daily      Continuous Infusions:   PRN Meds:.•  dextrose  •  dextrose  •  glucagon (human recombinant)  •  sodium chloride  •  [COMPLETED] Insert peripheral IV **AND** sodium chloride  •  sodium chloride    Past Medical History:   Diagnosis Date   • Anxiety    • Arthritis    • Benign essential hypertension 08/20/2014   • 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HCC)    • Depression    • Diabetes (HCC)    • Diabetes mellitus, type 2 (HCC)    • Disc degeneration, lumbar    • Headache, tension-type    • Hyperlipidemia    • Hypothyroidism    • Neuropathy    • Osteoporosis 09/09/2015   • Peripheral neuropathy    • Rotator cuff tear, left    • Scoliosis    • Shoulder pain     LEFT, TORN ROTATOR CUFF S/P FALL   • Spinal stenosis      Past Surgical History:   Procedure Laterality Date   • APPENDECTOMY     • BILATERAL BREAST REDUCTION Bilateral 08/2015   • CATARACT EXTRACTION  03/2015   • CHOLECYSTECTOMY WITH INTRAOPERATIVE CHOLANGIOGRAM N/A 3/27/2022    Procedure: CHOLECYSTECTOMY LAPAROSCOPIC INTRAOPERATIVE CHOLANGIOGRAM;   Surgeon: Aiyana Hill MD;  Location: [facility] MAIN OR;  Service: General;  Laterality: N/A;   • COLONOSCOPY  2015    WNL   • ERCP N/A 2022    Procedure: ENDOSCOPIC RETROGRADE CHOLANGIOPANCREATOGRAPHY with sphincterotomy and balloon sweep;  Surgeon: Chilo Wilhelm MD;  Location: [facility] ENDOSCOPY;  Service: Gastroenterology;  Laterality: N/A;  PRE/POST - CBD stones   • ERCP N/A 3/28/2022    Procedure: ENDOSCOPIC RETROGRADE CHOLANGIOPANCREATOGRAPHY WITH SPHINCTEROTOMY AND BALLOON SWEEP;  Surgeon: Chilo Wilhelm MD;  Location: [facility] ENDOSCOPY;  Service: Gastroenterology;  Laterality: N/A;  PRE: COMMON DUCT STONE  POST: COMMON DUCT STONE   • KYPHOPLASTY     • REDUCTION MAMMAPLASTY     • TONSILLECTOMY       Social History     Occupational History   • Not on file   Tobacco Use   • Smoking status: Former     Packs/day: 1.00     Types: Cigarettes     Quit date:      Years since quittin.8   • Smokeless tobacco: Never   Vaping Use   • Vaping Use: Never used   Substance and Sexual Activity   • Alcohol use: No   • Drug use: No   • Sexual activity: Defer      Social History     Social History Narrative   • Not on file     Family History   Problem Relation Age of Onset   • Bone cancer Mother    • No Known Problems Father          Review of Systems:   No other pertinent positives or negatives other than what is mentioned in the HPI and below.  Constitutional: Negative for fatigue, fever, or weight loss  HEENT: No active headache.  Pulmonary: Patient denies SOA.  Cardiovascular: Patient denies any chest pain.  Gastrointestinal:  Patient denies active vomiting or diarrhea.  Musculoskeletal: Positive for left shin pain.  Neurological: Patient denies active dizziness or loss of consciousness.  Skin: Patient denies any active bleeding.    Vital signs in last 24 hours:  Temp:  [97.2 °F (36.2 °C)-98.6 °F (37 °C)] 97.2 °F (36.2 °C)  Heart Rate:  [57-97] 97  Resp:  [18] 18  BP: (147-188)/(66-78)  "179/69  Vitals:    11/06/22 1632 11/06/22 1808 11/06/22 2100 11/07/22 0518   BP: (!) 185/69 (!) 188/71 173/68 179/69   BP Location: Right arm Right arm Right arm Right arm   Patient Position: Lying Lying Lying Lying   Pulse: 57 57 62 97   Resp: 18 18 18 18   Temp: 98.6 °F (37 °C) 98.3 °F (36.8 °C) 97.2 °F (36.2 °C)    TempSrc: Oral Oral Oral    SpO2: 95% 95% 95% 95%   Weight: 72.6 kg (160 lb)      Height: 176.8 cm (69.6\")             Physical Exam: 82 y.o. female         General Appearance:  Alert, cooperative, in no acute distress    HEENT:    Atraumatic, Pupils are equal   Neck:   Cervical spine midline, no appreciable JVD   Lungs:     Breathing non-labored and chest rise symmetric    Heart:   Abdomen:     Rectal:       Extremities:   Pulses  Neurovascular:   Skin:   Musculoskeletal:      Pulse regular    Soft, Non-tender or distended    Deferred        No clubbing, cyanosis, or edema    Intact    Cranial nerves 2 - 12 grossly intact, sensation intact    No skin lesions  Focused physical examination of the left lower extremity was performed.  Patient is resting comfortably in the hospital bed.  She does have some diffuse swelling of her bilateral lower extremities.  There is no erythema.  She is slightly tender to palpation of the anterior shin.  She is nontender over the knee.  She is nontender over the ankle.  Her compartments are soft.  She is able to actively flex and extend the knee.  She only has minimal discomfort with this.  Her main complaint is tenderness over the anterior shin on palpation.  Her compartments are soft.  She does not have any pain with passive dorsiflexion or plantar flexion.  Her ankle is stable to ligamentous exam.  Her foot is warm and well-perfused.  Pulses intact distally.     Diagnostic Tests:    Results from last 7 days   Lab Units 11/07/22  0458   WBC 10*3/mm3 10.49   HEMOGLOBIN g/dL 12.0   HEMATOCRIT % 36.5   PLATELETS 10*3/mm3 289     Results from last 7 days   Lab Units " 11/05/22  0643   SODIUM mmol/L 141   POTASSIUM mmol/L 4.3   CHLORIDE mmol/L 108*   CO2 mmol/L 22.3   BUN mg/dL 40*   CREATININE mg/dL 0.88   GLUCOSE mg/dL 220*   CALCIUM mg/dL 9.6     Results from last 7 days   Lab Units 11/03/22  2311   INR  1.03   APTT seconds 34.7     Study Result    Narrative & Impression   LEFT KNEE     HISTORY: Knee pain, fall.     AP and lateral views left knee demonstrate moderate loss of joint space  involving the medial and lateral compartments. Moderate patellofemoral  degenerative disease is noted. There is no evidence of fracture.  Extensive vascular calcification is noted.      This result has not been signed. Information might be incomplete.     Study Result    Narrative & Impression   LUMBAR SPINE COMPLETE WITH FLEXION-EXTENSION     HISTORY: Left shin pain, possible radiculopathy.     COMPARISON: MRI lumbar spine 06/23/2022.     FINDINGS: The patient is status post kyphoplasty at L3. There is a  severe compression deformity involving L3 as well as anterior bridging  osteophyte at L2-L3 similar to the MRI examination of 06/23/2022.  Moderate-to-severe loss of disc height and vacuum disc effect is  appreciated at L4-L5 as well as moderate endplate degenerative changes.  No obvious acute fracture is identified. There was no evidence of  subluxation in flexion or extension. Severe osteoarthritis involving the  right hip is noted with complete loss of joint space, sclerosis and  subchondral cyst formation. The etiology for the patient's left  radiculopathy is not established. Further evaluation could be performed  with a MRI examination of the lumbar spine.     This report was finalized on 11/7/2022 6:09 AM by Dr. Nolan Melgar M.D.        Study Result    Narrative & Impression   LEFT ANKLE, LEFT TIB-FIB, LEFT KNEE     HISTORY: Fall, pain in above areas.     LEFT KNEE: AP, lateral and sunrise views show no evidence of fracture or  of a suprapatellar fluid collection. There is moderate  to severe loss of  joint space involving the medial and lateral compartments respectively.  Moderate marginal osteophytes are noted laterally. Severe vascular  calcification is noted.     LEFT TIB-FIB: AP and lateral views of the left tibia and fibula show no  evidence of fracture.     LEFT ANKLE: AP, lateral and oblique views of the left ankle show no  evidence of fracture or dislocation.     This report was finalized on 11/5/2022 7:45 PM by Dr. Nolan Melgar M.D.         Assessment:  Patient Active Problem List   Diagnosis   • Compression fracture   • Lumbar degenerative disc disease   • Type 2 diabetes mellitus, with long-term current use of insulin ([facility])   • Hyperlipidemia   • Benign essential hypertension   • Primary hypothyroidism   • Leukocytosis   • Neuropathy   • Osteoporosis   • Proteinuria   • Tobacco abuse   • Diabetic eye exam ([facility])   • Generalized weakness   • Spinal stenosis   • Scoliosis   • Arthritis   • VBI (vertebrobasilar insufficiency)   • Orthostatic hypotension   • 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 ([facility])   • Severe hypothyroidism   • Noncompliance with medication regimen   • Vitamin D deficiency disease   • Altered mental status   • Tremor   • Seizure ([facility])   • Stage 3a chronic kidney disease ([facility])   • Thoracic degenerative disc disease   • Metabolic encephalopathy   • VIPUL (acute kidney injury) ([facility])   • Hyperglycemia   • Type II diabetes mellitus, uncontrolled   • Lower abdominal pain   • Transaminitis   • COVID-19 virus detected   • UTI (urinary tract infection)   • Emphysematous cystitis   • Choledocholithiasis   • Hypokalemia   • Hypoxia   • Generalized abdominal pain   • History of Clostridium difficile infection   • History of ERCP   • Acute UTI (urinary tract infection)   • Hypomagnesemia   • Burning pain   • Anxiety disorder   • Neuropathic pain   • Intertrigo   • Weakness of both lower extremities   • Acute metabolic encephalopathy   • Accelerated hypertension    • Acute cystitis without hematuria   • Altered mental status, unspecified altered mental status type   • Left lower lobe pneumonia   • Diarrhea         Plan:      I did have an extensive discussion with the patient regards her situation in the hospital today.  I have reviewed the above.  X-rays have remained negative.  Patient's source of pain is of her left shin.  She elicits this after a fall back in June.  She may have some evidence of shinsplints.  She also has some degenerative changes of her knee which may be causing some referred pain down to the shin.  She is continued with physical therapy.  I would instruct on continuation of physical therapy.  Continue with ice over this area.  As well as active and passive stretching of the area.  We can also order some lidocaine patches to be placed over the anterior shin for some pain relief.  From the orthopedic standpoint.  There was no fractures elicited on the x-rays.  There is no further surgical intervention warranted at this time.  Patient can follow-up in our office as an outpatient.    All findings will be discussed my supervising physician.  Thank you very much for this consultation and allowing us to be a part of the patient's care.  From the orthopedic standpoint.  She can be discharged to rehab when deemed medically stable.    Date: 11/7/2022  Dionisio Pryor PA-C

## 2022-11-07 NOTE — PLAN OF CARE
Goal Outcome Evaluation:  Plan of Care Reviewed With: patient        Progress: no change  Outcome Evaluation: Pt is an 83 yo female admitted from home with AMS and dirrhea. Found to have PNA. Pt seen this date for OT arnav, SAVAGE[employer]Ox3, reports (I) with ADLs, using Rwx since June when fall occurred, pt reports no falls since June. Pt reports she walks with a Rwx but mobility. Pt lives with her son, ledy morton son recently broke his hand and unable to assist fully if needed. Pt reports she had [employer] 2x/week. Pt presents this date below ADL baseline with impaired balance, mobility, act tolerance, and generalized weakness. Pt UIC this date, able to demo figure 4 for LBD, STS with min A and static standing balance with Jayson. Pt will continue to benefit from skilled OT to  address noted func deficits and progress toward prior level of function. Pt requesting home with son, letty HHOT at this time. will update as pending progress. Pt has been in/out of SNFs due to 7-8 hospitalizations since last decembder and requesting home.

## 2022-11-07 NOTE — THERAPY EVALUATION
Patient Name: Halie Guidry  : 1940    MRN: [id number]                              Today's Date: 2022       Admit Date: 11/3/2022    Visit Dx:     ICD-10-CM ICD-9-CM   1. Altered mental status, unspecified altered mental status type  R41.82 780.97   2. Pneumonia of left lower lobe due to infectious organism  J18.9 486   3. Hyperglycemia  R73.9 790.29     Patient Active Problem List   Diagnosis   • Compression fracture   • Lumbar degenerative disc disease   • Type 2 diabetes mellitus, with long-term current use of insulin ([facility])   • Hyperlipidemia   • Benign essential hypertension   • Primary hypothyroidism   • Leukocytosis   • Neuropathy   • Osteoporosis   • Proteinuria   • Tobacco abuse   • Diabetic eye exam ([facility])   • Generalized weakness   • Spinal stenosis   • Scoliosis   • Arthritis   • VBI (vertebrobasilar insufficiency)   • Orthostatic hypotension   • 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 ([facility])   • Severe hypothyroidism   • Noncompliance with medication regimen   • Vitamin D deficiency disease   • Altered mental status   • Tremor   • Seizure ([facility])   • Stage 3a chronic kidney disease ([facility])   • Thoracic degenerative disc disease   • Metabolic encephalopathy   • VIPUL (acute kidney injury) ([facility])   • Hyperglycemia   • Type II diabetes mellitus, uncontrolled   • Lower abdominal pain   • Transaminitis   • COVID-19 virus detected   • UTI (urinary tract infection)   • Emphysematous cystitis   • Choledocholithiasis   • Hypokalemia   • Hypoxia   • Generalized abdominal pain   • History of Clostridium difficile infection   • History of ERCP   • Acute UTI (urinary tract infection)   • Hypomagnesemia   • Burning pain   • Anxiety disorder   • Neuropathic pain   • Intertrigo   • Weakness of both lower extremities   • Acute metabolic encephalopathy   • Accelerated hypertension   • Acute cystitis without hematuria   • Altered mental status, unspecified altered mental status type   • Left  lower lobe pneumonia   • Diarrhea     Past Medical History:   Diagnosis Date   • Anxiety    • Arthritis    • Benign essential hypertension 08/20/2014   • 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HCC)    • Depression    • Diabetes (HCC)    • Diabetes mellitus, type 2 (HCC)    • Disc degeneration, lumbar    • Headache, tension-type    • Hyperlipidemia    • Hypothyroidism    • Neuropathy    • Osteoporosis 09/09/2015   • Peripheral neuropathy    • Rotator cuff tear, left    • Scoliosis    • Shoulder pain     LEFT, TORN ROTATOR CUFF S/P FALL   • Spinal stenosis      Past Surgical History:   Procedure Laterality Date   • APPENDECTOMY     • BILATERAL BREAST REDUCTION Bilateral 08/2015   • CATARACT EXTRACTION  03/2015   • CHOLECYSTECTOMY WITH INTRAOPERATIVE CHOLANGIOGRAM N/A 3/27/2022    Procedure: CHOLECYSTECTOMY LAPAROSCOPIC INTRAOPERATIVE CHOLANGIOGRAM;  Surgeon: Aiyana Hill MD;  Location: [facility] MAIN OR;  Service: General;  Laterality: N/A;   • COLONOSCOPY  06/05/2015    WNL   • ERCP N/A 2/25/2022    Procedure: ENDOSCOPIC RETROGRADE CHOLANGIOPANCREATOGRAPHY with sphincterotomy and balloon sweep;  Surgeon: Chilo Wilhelm MD;  Location: [facility] ENDOSCOPY;  Service: Gastroenterology;  Laterality: N/A;  PRE/POST - CBD stones   • ERCP N/A 3/28/2022    Procedure: ENDOSCOPIC RETROGRADE CHOLANGIOPANCREATOGRAPHY WITH SPHINCTEROTOMY AND BALLOON SWEEP;  Surgeon: Chilo Wilhelm MD;  Location: [facility] ENDOSCOPY;  Service: Gastroenterology;  Laterality: N/A;  PRE: COMMON DUCT STONE  POST: COMMON DUCT STONE   • KYPHOPLASTY     • REDUCTION MAMMAPLASTY     • TONSILLECTOMY        General Information     Row Name 11/07/22 1310          OT Time and Intention    Document Type evaluation  -MW     Mode of Treatment occupational therapy  -MW     Row Name 11/07/22 1310          General Information    Patient Profile Reviewed yes  -MW     Prior Level of Function independent:;ADL's  Rwx  -MW     Existing Precautions/Restrictions fall  -MW     Barriers  to Rehab none identified  -     Row Name 11/07/22 1310          Living Environment    People in Home child(beatriz), adult  -     Row Name 11/07/22 1310          Home Main Entrance    Number of Stairs, Main Entrance none  -     Row Name 11/07/22 1310          Cognition    Orientation Status (Cognition) oriented x 4  -MW     Row Name 11/07/22 1310          Safety Issues, Functional Mobility    Impairments Affecting Function (Mobility) balance;endurance/activity tolerance;strength  -           User Key  (r) = Recorded By, (t) = Taken By, (c) = Cosigned By    Initials Name Provider Type    MW Arcelia Valentin OT Occupational Therapist                 Mobility/ADL's     Row Name 11/07/22 1311          Bed Mobility    Comment, (Bed Mobility) NT UIC  -[facility] Name 11/07/22 1311          Transfers    Transfers sit-stand transfer  -[facility] Name 11/07/22 1311          Sit-Stand Transfer    Sit-Stand [place] (Transfers) minimum assist (75% patient effort)  -     Assistive Device (Sit-Stand Transfers) walker, front-wheeled  -     Comment, (Sit-Stand Transfer) 2x before coming fully upright  -[facility] Name 11/07/22 1311          Functional Mobility    Functional Mobility- Comment encouraged pt use Rwx with staff and up to BR commode for increased repetition for functional transfers  -[facility] Name 11/07/22 1311          Activities of Daily Living    BADL Assessment/Intervention lower body dressing;feeding  -[facility] Name 11/07/22 1311          Lower Body Dressing Assessment/Training    Comment, (Lower Body Dressing) able to complete figure 4 while UIC  -[facility] Name 11/07/22 1311          Toileting Assessment/Training    Comment, (Toileting) encouraged up to BR commode for future voiding  -[facility] Name 11/07/22 1311          Self-Feeding Assessment/Training    Comment, (Feeding) (I) hand to mouth  -           User Key  (r) = Recorded By, (t) = Taken By, (c) = Cosigned By    Initials Name  Provider Type    Arcelia Wills OT Occupational Therapist               Obj/Interventions     Row Name 11/07/22 1314          Sensory Assessment (Somatosensory)    Sensory Assessment (Somatosensory) UE sensation intact  -     Row Name 11/07/22 1314          Vision Assessment/Intervention    Visual Impairment/Limitations WFL  -     Row Name 11/07/22 1314          Range of Motion Comprehensive    General Range of Motion upper extremity range of motion deficits identified  -     Comment, General Range of Motion L shoulder limited 2/2 prev RC tear in 2016  -     Row Name 11/07/22 1314          Strength Comprehensive (MMT)    General Manual Muscle Testing (MMT) Assessment upper extremity strength deficits identified  -     Comment, General Manual Muscle Testing (MMT) Assessment generalized weakness, BUE grossly 4-/5  -MW     Row Name 11/07/22 1314          Motor Skills    Motor Skills functional endurance  -     Functional Endurance fair  -     Row Name 11/07/22 1314          Balance    Balance Assessment sitting static balance;sitting dynamic balance;sit to stand dynamic balance;standing static balance  -     Static Sitting Balance independent  -     Dynamic Sitting Balance standby assist  -     Position, Sitting Balance sitting in chair  -     Sit to Stand Dynamic Balance minimal assist  -     Static Standing Balance minimal assist  -MW     Position/Device Used, Standing Balance supported;walker, front-wheeled  -     Balance Interventions static;sit to stand;supported  -     Comment, Balance unsteadiness with static standing  -           User Key  (r) = Recorded By, (t) = Taken By, (c) = Cosigned By    Initials Name Provider Type    Arcelia Wills OT Occupational Therapist               Goals/Plan     Row Name 11/07/22 1319          Transfer Goal 1 (OT)    Activity/Assistive Device (Transfer Goal 1, OT) sit-to-stand/stand-to-sit;bed-to-chair/chair-to-bed;toilet  -      [place] Level/Cues Needed (Transfer Goal 1, OT) modified independence  -MW     Time Frame (Transfer Goal 1, OT) short term goal (STG);2 weeks  -MW     Progress/Outcome (Transfer Goal 1, OT) goal ongoing  -     Row Name 11/07/22 1319          Dressing Goal 1 (OT)    Activity/Device (Dressing Goal 1, OT) upper body dressing;lower body dressing  -MW     [place]/Cues Needed (Dressing Goal 1, OT) set-up required  -MW     Time Frame (Dressing Goal 1, OT) short term goal (STG);2 weeks  -MW     Progress/Outcome (Dressing Goal 1, OT) goal ongoing  -     Row Name 11/07/22 1319          Toileting Goal 1 (OT)    Activity/Device (Toileting Goal 1, OT) toileting skills, all  -MW     [place] Level/Cues Needed (Toileting Goal 1, OT) modified independence  -MW     Time Frame (Toileting Goal 1, OT) short term goal (STG);2 weeks  -MW     Progress/Outcome (Toileting Goal 1, OT) goal ongoing  -     Row Name 11/07/22 1319          Grooming Goal 1 (OT)    Activity/Device (Grooming Goal 1, OT) grooming skills, all  -MW     [place] (Grooming Goal 1, OT) modified independence  -MW     Time Frame (Grooming Goal 1, OT) short term goal (STG);2 weeks  -MW     Progress/Outcome (Grooming Goal 1, OT) goal ongoing  -     Row Name 11/07/22 1319          Therapy Assessment/Plan (OT)    Planned Therapy Interventions (OT) activity tolerance training;functional balance retraining;BADL retraining;neuromuscular control/coordination retraining;occupation/activity based interventions;ROM/therapeutic exercise;strengthening exercise;transfer/mobility retraining;patient/caregiver education/training  -           User Key  (r) = Recorded By, (t) = Taken By, (c) = Cosigned By    Initials Name Provider Type    MW Arcelia Valentin OT Occupational Therapist               Clinical Impression     Row Name 11/07/22 1316          Pain Assessment    Pretreatment Pain Rating 0/10 - no pain  -MW     Posttreatment Pain Rating 0/10 - no pain   -     Row Name 11/07/22 1315          Plan of Care Review    Plan of Care Reviewed With patient  -     Progress no change  -     Outcome Evaluation Pt is an 83 yo female admitted from home with AMS and dirrhea. Found to have PNA. Pt seen this date for OT arnav, SAVAGE&Ox3, reports (I) with ADLs, using Rwx since June when fall occurred, pt reports no falls since June. Pt reports she walks with a Rwx but mobility. Pt lives with her son, ledy morton son recently broke his hand and unable to assist fully if needed. Pt reports she had Caretenders 2x/week. Pt presents this date below ADL baseline with impaired balance, mobility, act tolerance, and generalized weakness. Pt UIC this date, able to demo figure 4 for LBD, STS with min A and static standing balance with Jayson. Pt will continue to benefit from skilled OT to  address noted func deficits and progress toward prior level of function. Pt requesting home with son, rec HHOT at this time. will update as pending progress. Pt has been in/out of SNFs due to 7-8 hospitalizations since last decembder and requesting home.  -     Row Name 11/07/22 1315          Therapy Assessment/Plan (OT)    Rehab Potential (OT) good, to achieve stated therapy goals  -     Criteria for Skilled Therapeutic Interventions Met (OT) yes;meets criteria;skilled treatment is necessary  -     Row Name 11/07/22 1315          Therapy Plan Review/Discharge Plan (OT)    Anticipated Discharge Disposition (OT) home;home with assist;home with home health  -     Row Name 11/07/22 1315          Vital Signs    O2 Delivery Pre Treatment room air  -MW     Pre Patient Position Sitting  -MW     Intra Patient Position Standing  -MW     Post Patient Position Sitting  -MW     Row Name 11/07/22 1315          Positioning and Restraints    Pre-Treatment Position sitting in chair/recliner  -MW     Post Treatment Position chair  -MW     In Chair notified nsg;reclined;call light within reach;encouraged to call for  assist;exit alarm on  -           User Key  (r) = Recorded By, (t) = Taken By, (c) = Cosigned By    Initials Name Provider Type    MW Arcelia Valentin, OT Occupational Therapist               Outcome Measures     Row Name 11/07/22 1320          How much help from another is currently needed...    Putting on and taking off regular lower body clothing? 3  -MW     Bathing (including washing, rinsing, and drying) 3  -MW     Toileting (which includes using toilet bed pan or urinal) 3  -MW     Putting on and taking off regular upper body clothing 3  -MW     Taking care of personal grooming (such as brushing teeth) 3  -MW     Eating meals 3  -MW     AM-PAC 6 Clicks Score (OT) 18  -MW     Row Name 11/07/22 1157 11/07/22 0841       How much help from another person do you currently need...    Turning from your back to your side while in flat bed without using bedrails? 3  -SM 4  -CD    Moving from lying on back to sitting on the side of a flat bed without bedrails? 3  -SM 3  -CD    Moving to and from a bed to a chair (including a wheelchair)? 3  -SM 3  -CD    Standing up from a chair using your arms (e.g., wheelchair, bedside chair)? 3  -SM 3  -CD    Climbing 3-5 steps with a railing? 2  -SM 3  -CD    To walk in hospital room? 3  -SM 3  -CD    AM-PAC 6 Clicks Score (PT) 17  -SM 19  -CD    Highest level of mobility 5 --> Static standing  - 6 --> Walked 10 steps or more  -    Row Name 11/07/22 1320          Modified Geno Scale    Modified Geno Scale 3 - Moderate disability.  Requiring some help, but able to walk without assistance.  -     Row Name 11/07/22 1320 11/07/22 1157       Functional Assessment    Outcome Measure Options AM-PAC 6 Clicks Daily Activity (OT);Modified [place]  - AM-PAC 6 Clicks Basic Mobility (PT)  -          User Key  (r) = Recorded By, (t) = Taken By, (c) = Cosigned By    Initials Name Provider Type    Dia Randall RN Registered Nurse    Savita Leonard PTA Physical  Therapist Assistant    Arcelia Wills OT Occupational Therapist                Occupational Therapy Education     Title: PT OT SLP Therapies (Done)     Topic: Occupational Therapy (Done)     Point: ADL training (Done)     Description:   Instruct learner(s) on proper safety adaptation and remediation techniques during self care or transfers.   Instruct in proper use of assistive devices.              Learning Progress Summary           Patient Acceptance, E, VU by  at 11/7/2022 1320    Comment: role of OT, d/c rec    Acceptance, E,TB,D, VU,DU,NR by  at 11/5/2022 2110                   Point: Home exercise program (Done)     Description:   Instruct learner(s) on appropriate technique for monitoring, assisting and/or progressing therapeutic exercises/activities.              Learning Progress Summary           Patient Acceptance, E,TB,D, VU,DU,NR by  at 11/5/2022 2110                   Point: Precautions (Done)     Description:   Instruct learner(s) on prescribed precautions during self-care and functional transfers.              Learning Progress Summary           Patient Acceptance, E, VU by  at 11/7/2022 1320    Comment: role of OT, d/c rec    Acceptance, E,TB,D, VU,DU,NR by  at 11/5/2022 2110                   Point: Body mechanics (Done)     Description:   Instruct learner(s) on proper positioning and spine alignment during self-care, functional mobility activities and/or exercises.              Learning Progress Summary           Patient Acceptance, E, VU by  at 11/7/2022 1320    Comment: role of OT, d/c rec    Acceptance, E,TB,D, VU,DU,NR by  at 11/5/2022 2110                               User Key     Initials Effective Dates Name Provider Type Discipline     06/16/21 -  Marion Owusu, RN Registered Nurse Nurse     08/20/21 -  Arcelia Valentin OT Occupational Therapist OT              OT Recommendation and Plan  Planned Therapy Interventions (OT): activity tolerance training,  functional balance retraining, BADL retraining, neuromuscular control/coordination retraining, occupation/activity based interventions, ROM/therapeutic exercise, strengthening exercise, transfer/mobility retraining, patient/caregiver education/training  Plan of Care Review  Plan of Care Reviewed With: patient  Progress: no change  Outcome Evaluation: Pt is an 81 yo female admitted from home with AMS and dirrhea. Found to have PNA. Pt seen this date for OT eval, A&Ox3, reports (I) with ADLs, using Rwx since June when fall occurred, pt reports no falls since June. Pt reports she walks with a Rwx but mobility. Pt lives with her son, ledy morton son recently broke his hand and unable to assist fully if needed. Pt reports she had Caretenders 2x/week. Pt presents this date below ADL baseline with impaired balance, mobility, act tolerance, and generalized weakness. Pt UIC this date, able to demo figure 4 for LBD, STS with min A and static standing balance with Jayson. Pt will continue to benefit from skilled OT to  address noted func deficits and progress toward prior level of function. Pt requesting home with son, letty VELAZQUEZ at this time. will update as pending progress. Pt has been in/out of SNFs due to 7-8 hospitalizations since last decembder and requesting home.     Time Calculation:    Time Calculation- OT     Row Name 11/07/22 1321             Time Calculation- OT    OT Start Time 1053  -MW      OT Stop Time 1110  -MW      OT Time Calculation (min) 17 min  -MW      Total Timed Code Minutes- OT 10 minute(s)  -MW      OT Received On 11/07/22  -MW      OT - Next Appointment 11/08/22  -MW      OT Goal Re-Cert Due Date 11/21/22  -MW         Timed Charges    20592 - OT Therapeutic Activity Minutes 10  -MW         Untimed Charges    OT Eval/Re-eval Minutes 7  -MW         Total Minutes    Timed Charges Total Minutes 10  -MW      Untimed Charges Total Minutes 7  -MW       Total Minutes 17  -MW            User Key  (r) = Recorded  By, (t) = Taken By, (c) = Cosigned By    Initials Name Provider Type    Arcelia Wills OT Occupational Therapist              Therapy Charges for Today     Code Description Service Date Service Provider Modifiers Qty    [id number]  OT THERAPEUTIC ACT EA 15 MIN 11/7/2022 Arcelia Valentin OT GO 1    [id number]  OT EVAL MOD COMPLEXITY 2 11/7/2022 Arcelia Valentin OT GO 1               Arcelia Valentin OT  11/7/2022

## 2022-11-07 NOTE — THERAPY TREATMENT NOTE
Patient Name: Halie Guidry  : 1940    MRN: [id number]                              Today's Date: 2022       Admit Date: 11/3/2022    Visit Dx:     ICD-10-CM ICD-9-CM   1. Altered mental status, unspecified altered mental status type  R41.82 780.97   2. Pneumonia of left lower lobe due to infectious organism  J18.9 486   3. Hyperglycemia  R73.9 790.29     Patient Active Problem List   Diagnosis   • Compression fracture   • Lumbar degenerative disc disease   • Type 2 diabetes mellitus, with long-term current use of insulin ([facility])   • Hyperlipidemia   • Benign essential hypertension   • Primary hypothyroidism   • Leukocytosis   • Neuropathy   • Osteoporosis   • Proteinuria   • Tobacco abuse   • Diabetic eye exam ([facility])   • Generalized weakness   • Spinal stenosis   • Scoliosis   • Arthritis   • VBI (vertebrobasilar insufficiency)   • Orthostatic hypotension   • 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 ([facility])   • Severe hypothyroidism   • Noncompliance with medication regimen   • Vitamin D deficiency disease   • Altered mental status   • Tremor   • Seizure ([facility])   • Stage 3a chronic kidney disease ([facility])   • Thoracic degenerative disc disease   • Metabolic encephalopathy   • VIPUL (acute kidney injury) ([facility])   • Hyperglycemia   • Type II diabetes mellitus, uncontrolled   • Lower abdominal pain   • Transaminitis   • COVID-19 virus detected   • UTI (urinary tract infection)   • Emphysematous cystitis   • Choledocholithiasis   • Hypokalemia   • Hypoxia   • Generalized abdominal pain   • History of Clostridium difficile infection   • History of ERCP   • Acute UTI (urinary tract infection)   • Hypomagnesemia   • Burning pain   • Anxiety disorder   • Neuropathic pain   • Intertrigo   • Weakness of both lower extremities   • Acute metabolic encephalopathy   • Accelerated hypertension   • Acute cystitis without hematuria   • Altered mental status, unspecified altered mental status type   • Left  lower lobe pneumonia   • Diarrhea     Past Medical History:   Diagnosis Date   • Anxiety    • Arthritis    • Benign essential hypertension 08/20/2014   • 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HCC)    • Depression    • Diabetes (HCC)    • Diabetes mellitus, type 2 (HCC)    • Disc degeneration, lumbar    • Headache, tension-type    • Hyperlipidemia    • Hypothyroidism    • Neuropathy    • Osteoporosis 09/09/2015   • Peripheral neuropathy    • Rotator cuff tear, left    • Scoliosis    • Shoulder pain     LEFT, TORN ROTATOR CUFF S/P FALL   • Spinal stenosis      Past Surgical History:   Procedure Laterality Date   • APPENDECTOMY     • BILATERAL BREAST REDUCTION Bilateral 08/2015   • CATARACT EXTRACTION  03/2015   • CHOLECYSTECTOMY WITH INTRAOPERATIVE CHOLANGIOGRAM N/A 3/27/2022    Procedure: CHOLECYSTECTOMY LAPAROSCOPIC INTRAOPERATIVE CHOLANGIOGRAM;  Surgeon: Aiyana Hill MD;  Location: [facility] MAIN OR;  Service: General;  Laterality: N/A;   • COLONOSCOPY  06/05/2015    WNL   • ERCP N/A 2/25/2022    Procedure: ENDOSCOPIC RETROGRADE CHOLANGIOPANCREATOGRAPHY with sphincterotomy and balloon sweep;  Surgeon: Chilo Wilhelm MD;  Location: [facility] ENDOSCOPY;  Service: Gastroenterology;  Laterality: N/A;  PRE/POST - CBD stones   • ERCP N/A 3/28/2022    Procedure: ENDOSCOPIC RETROGRADE CHOLANGIOPANCREATOGRAPHY WITH SPHINCTEROTOMY AND BALLOON SWEEP;  Surgeon: Chilo Wilhelm MD;  Location: [facility] ENDOSCOPY;  Service: Gastroenterology;  Laterality: N/A;  PRE: COMMON DUCT STONE  POST: COMMON DUCT STONE   • KYPHOPLASTY     • REDUCTION MAMMAPLASTY     • TONSILLECTOMY        General Information     Row Name 11/07/22 1153          Physical Therapy Time and Intention    Document Type therapy note (daily note)  -     Mode of Treatment physical therapy  -     Row Name 11/07/22 1153          General Information    Existing Precautions/Restrictions fall  -     Row Name 11/07/22 1153          Cognition    Orientation Status (Cognition)  oriented x 4  -SM           User Key  (r) = Recorded By, (t) = Taken By, (c) = Cosigned By    Initials Name Provider Type     Savita Ray PTA Physical Therapist Assistant               Mobility     Row Name 11/07/22 1154          Bed Mobility    Bed Mobility supine-sit  -SM     Supine-Sit [place] (Bed Mobility) standby assist  -     Assistive Device (Bed Mobility) bed rails;head of bed elevated  -SM     Row Name 11/07/22 1154          Sit-Stand Transfer    Sit-Stand [place] (Transfers) minimum assist (75% patient effort)  -     Assistive Device (Sit-Stand Transfers) walker, front-wheeled  -SM     Comment, (Sit-Stand Transfer) took 2-3 attempts before successful  -SM     Row Name 11/07/22 1154          Gait/Stairs (Locomotion)    [place] Level (Gait) contact guard  -     Assistive Device (Gait) walker, front-wheeled  -SM     Distance in Feet (Gait) 5  -SM     Deviations/Abnormal Patterns (Gait) tess decreased;stride length decreased  -SM     Bilateral Gait Deviations forward flexed posture  -SM           User Key  (r) = Recorded By, (t) = Taken By, (c) = Cosigned By    Initials Name Provider Type    Savita Leonard PTA Physical Therapist Assistant               Obj/Interventions    No documentation.                Goals/Plan    No documentation.                Clinical Impression     Row Name 11/07/22 1156          Pain    Pretreatment Pain Rating 0/10 - no pain  -SM     Posttreatment Pain Rating 0/10 - no pain  -SM     Row Name 11/07/22 1156          Positioning and Restraints    Pre-Treatment Position in bed  -SM     Post Treatment Position chair  -SM     In Chair reclined;call light within reach;encouraged to call for assist  -SM           User Key  (r) = Recorded By, (t) = Taken By, (c) = Cosigned By    Initials Name Provider Type    Savita Leonard PTA Physical Therapist Assistant               Outcome Measures     Row Name 11/07/22 1157 11/07/22 0841       How  much help from another person do you currently need...    Turning from your back to your side while in flat bed without using bedrails? 3  - 4  -CD    Moving from lying on back to sitting on the side of a flat bed without bedrails? 3  - 3  -CD    Moving to and from a bed to a chair (including a wheelchair)? 3  - 3  -CD    Standing up from a chair using your arms (e.g., wheelchair, bedside chair)? 3  - 3  -CD    Climbing 3-5 steps with a railing? 2  - 3  -CD    To walk in hospital room? 3  -SM 3  -CD    AM-PAC 6 Clicks Score (PT) 17  - 19  -CD    Highest level of mobility 5 --> Static standing  - 6 --> Walked 10 steps or more  -    Row Name 11/07/22 1157          Functional Assessment    Outcome Measure Options AM-PAC 6 Clicks Basic Mobility (PT)  -           User Key  (r) = Recorded By, (t) = Taken By, (c) = Cosigned By    Initials Name Provider Type     Dia Iglesias RN Registered Nurse    Savita Leonard PTA Physical Therapist Assistant                             Physical Therapy Education     Title: PT OT SLP Therapies (Done)     Topic: Physical Therapy (Done)     Point: Mobility training (Done)     Learning Progress Summary           Patient Acceptance, E,TB,D, VU,NR by  at 11/7/2022 1157    Acceptance, E,TB,D, VU,DU,NR by  at 11/5/2022 2110    Acceptance, E, VU,NR by  at 11/5/2022 1809                   Point: Home exercise program (Done)     Learning Progress Summary           Patient Acceptance, E,TB,D, VU,NR by  at 11/7/2022 1157    Acceptance, E,TB,D, VU,DU,NR by  at 11/5/2022 2110    Acceptance, E, VU,NR by  at 11/5/2022 1809                   Point: Body mechanics (Done)     Learning Progress Summary           Patient Acceptance, E,TB,D, VU,NR by  at 11/7/2022 1157    Acceptance, E,TB,D, VU,DU,NR by  at 11/5/2022 2110    Acceptance, E, VU,NR by  at 11/5/2022 1809                   Point: Precautions (Done)     Learning Progress Summary           Patient  Acceptance, E,TB,D, VU,NR by  at 11/7/2022 1157    Acceptance, E,TB,D, VU,DU,NR by  at 11/5/2022 2110    Acceptance, E, VU,NR by  at 11/5/2022 1809                               User Key     Initials Effective Dates Name Provider Type Discipline     06/16/21 -  Isaura Naidu, PT Physical Therapist PT     06/16/21 -  Marion Owusu RN Registered Nurse Nurse     03/07/18 -  Savita Ray PTA Physical Therapist Assistant PT              PT Recommendation and Plan     Plan of Care Reviewed With: patient  Progress: improving  Outcome Evaluation: Pt tolerated treatment fair this date. Required SBA for bed mobility, though then min A to stand, requiring 2-3 attempts before successful. Pt ambulated 5ft w/ Rw and CGA to the chair. Encouraged pt to ambulate w/ nsg in room, and to attempt exercises in chair on own. Pt is wanting to d/c home w/ support from son, though son now has a broken hand. Pt states that her son's girlfriend would be able to help some as well, and pt was current w/ HH.     Time Calculation:    PT Charges     Row Name 11/07/22 1202             Time Calculation    Start Time 0858  -      Stop Time 0940  -      Time Calculation (min) 42 min  -      PT Received On 11/07/22  -      PT - Next Appointment 11/08/22  -            User Key  (r) = Recorded By, (t) = Taken By, (c) = Cosigned By    Initials Name Provider Type     Savita Ray PTA Physical Therapist Assistant              Therapy Charges for Today     Code Description Service Date Service Provider Modifiers Qty    36947421538 HC PT THER PROC EA 15 MIN 11/7/2022 Savita Ray PTA GP 1    97601929250 HC PT THERAPEUTIC ACT EA 15 MIN 11/7/2022 Savita Ray PTA GP 1    73289333372 HC GAIT TRAINING EA 15 MIN 11/7/2022 Savita Ray PTA GP 1          PT G-Codes  Outcome Measure Options: AM-PAC 6 Clicks Basic Mobility (PT)  AM-PAC 6 Clicks Score (PT): 17    Savita Ray  PTA  11/7/2022

## 2022-11-07 NOTE — PLAN OF CARE
Goal Outcome Evaluation:  Plan of Care Reviewed With: patient        Progress: improving  Outcome Evaluation: Pt tolerated treatment fair this date. Required SBA for bed mobility, though then min A to stand, requiring 2-3 attempts before successful. Pt ambulated 5ft w/ Rw and CGA to the chair. Encouraged pt to ambulate w/ nsg in room, and to attempt exercises in chair on own. Pt is wanting to d/c home w/ support from son, though son now has a broken hand. Pt states that her son's girlfriend would be able to help some as well, and pt was current w/ HH.

## 2022-11-07 NOTE — PLAN OF CARE
Goal Outcome Evaluation:           Progress: improving  Outcome Evaluation: Patient A&O, c/o chronic lower back pain, scheduled PO pain medication given, patient up to BSC with assist x 1, buttocks and marquez-area reddened, brief changed and barrier cream applied, falls and seizure precautions maintained, bed alarm in use, tolerating diet, BP elevated, otherwise VSS.

## 2022-11-08 LAB
GLUCOSE BLDC GLUCOMTR-MCNC: 107 MG/DL (ref 70–130)
GLUCOSE BLDC GLUCOMTR-MCNC: 126 MG/DL (ref 70–130)
GLUCOSE BLDC GLUCOMTR-MCNC: 155 MG/DL (ref 70–130)
GLUCOSE BLDC GLUCOMTR-MCNC: 218 MG/DL (ref 70–130)

## 2022-11-08 PROCEDURE — 82962 GLUCOSE BLOOD TEST: CPT

## 2022-11-08 PROCEDURE — 63710000001 INSULIN LISPRO (HUMAN) PER 5 UNITS: Performed by: HOSPITALIST

## 2022-11-08 PROCEDURE — 97110 THERAPEUTIC EXERCISES: CPT

## 2022-11-08 PROCEDURE — 25010000002 ENOXAPARIN PER 10 MG: Performed by: HOSPITALIST

## 2022-11-08 RX ADMIN — CEFDINIR 300 MG: 300 CAPSULE ORAL at 21:25

## 2022-11-08 RX ADMIN — LISINOPRIL 10 MG: 10 TABLET ORAL at 08:29

## 2022-11-08 RX ADMIN — INSULIN GLARGINE-YFGN 40 UNITS: 100 INJECTION, SOLUTION SUBCUTANEOUS at 21:29

## 2022-11-08 RX ADMIN — INSULIN LISPRO 8 UNITS: 100 INJECTION, SOLUTION INTRAVENOUS; SUBCUTANEOUS at 16:57

## 2022-11-08 RX ADMIN — PREGABALIN 50 MG: 50 CAPSULE ORAL at 08:29

## 2022-11-08 RX ADMIN — AMLODIPINE BESYLATE 10 MG: 10 TABLET ORAL at 08:29

## 2022-11-08 RX ADMIN — ACETAMINOPHEN 650 MG: 325 TABLET, FILM COATED ORAL at 14:33

## 2022-11-08 RX ADMIN — PREGABALIN 50 MG: 50 CAPSULE ORAL at 21:20

## 2022-11-08 RX ADMIN — LEVOTHYROXINE SODIUM 112 MCG: 0.11 TABLET ORAL at 06:15

## 2022-11-08 RX ADMIN — HYDROCODONE BITARTRATE AND ACETAMINOPHEN 1 TABLET: 5; 325 TABLET ORAL at 08:29

## 2022-11-08 RX ADMIN — INSULIN LISPRO 2 UNITS: 100 INJECTION, SOLUTION INTRAVENOUS; SUBCUTANEOUS at 12:22

## 2022-11-08 RX ADMIN — BISOPROLOL FUMARATE 5 MG: 5 TABLET, FILM COATED ORAL at 08:29

## 2022-11-08 RX ADMIN — CHLORTHALIDONE 25 MG: 25 TABLET ORAL at 08:29

## 2022-11-08 RX ADMIN — Medication 10 ML: at 21:21

## 2022-11-08 RX ADMIN — Medication 3 MG: at 21:20

## 2022-11-08 RX ADMIN — HYDROCODONE BITARTRATE AND ACETAMINOPHEN 1 TABLET: 5; 325 TABLET ORAL at 16:57

## 2022-11-08 RX ADMIN — PANTOPRAZOLE SODIUM 40 MG: 40 TABLET, DELAYED RELEASE ORAL at 06:15

## 2022-11-08 RX ADMIN — DULOXETINE HYDROCHLORIDE 30 MG: 30 CAPSULE, DELAYED RELEASE ORAL at 08:29

## 2022-11-08 RX ADMIN — ASPIRIN 81 MG: 81 TABLET, COATED ORAL at 08:29

## 2022-11-08 RX ADMIN — Medication 10 ML: at 08:29

## 2022-11-08 RX ADMIN — ATORVASTATIN CALCIUM 80 MG: 20 TABLET, FILM COATED ORAL at 21:20

## 2022-11-08 RX ADMIN — Medication 1000 MCG: at 08:29

## 2022-11-08 RX ADMIN — LIDOCAINE 1 PATCH: 700 PATCH TOPICAL at 08:30

## 2022-11-08 RX ADMIN — HYDROCODONE BITARTRATE AND ACETAMINOPHEN 1 TABLET: 5; 325 TABLET ORAL at 21:20

## 2022-11-08 RX ADMIN — PREGABALIN 50 MG: 50 CAPSULE ORAL at 16:57

## 2022-11-08 RX ADMIN — INSULIN LISPRO 8 UNITS: 100 INJECTION, SOLUTION INTRAVENOUS; SUBCUTANEOUS at 12:22

## 2022-11-08 RX ADMIN — INSULIN LISPRO 3 UNITS: 100 INJECTION, SOLUTION INTRAVENOUS; SUBCUTANEOUS at 16:57

## 2022-11-08 RX ADMIN — INSULIN LISPRO 8 UNITS: 100 INJECTION, SOLUTION INTRAVENOUS; SUBCUTANEOUS at 08:30

## 2022-11-08 RX ADMIN — CEFDINIR 300 MG: 300 CAPSULE ORAL at 08:29

## 2022-11-08 RX ADMIN — ENOXAPARIN SODIUM 40 MG: 100 INJECTION SUBCUTANEOUS at 08:30

## 2022-11-08 NOTE — THERAPY TREATMENT NOTE
Patient Name: Halie Guidry  : 1940    MRN: [id number]                              Today's Date: 2022       Admit Date: 11/3/2022    Visit Dx:     ICD-10-CM ICD-9-CM   1. Altered mental status, unspecified altered mental status type  R41.82 780.97   2. Pneumonia of left lower lobe due to infectious organism  J18.9 486   3. Hyperglycemia  R73.9 790.29     Patient Active Problem List   Diagnosis   • Compression fracture   • Lumbar degenerative disc disease   • Type 2 diabetes mellitus, with long-term current use of insulin ([facility])   • Hyperlipidemia   • Benign essential hypertension   • Primary hypothyroidism   • Leukocytosis   • Neuropathy   • Osteoporosis   • Proteinuria   • Tobacco abuse   • Diabetic eye exam ([facility])   • Generalized weakness   • Spinal stenosis   • Scoliosis   • Arthritis   • VBI (vertebrobasilar insufficiency)   • Orthostatic hypotension   • 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 ([facility])   • Severe hypothyroidism   • Noncompliance with medication regimen   • Vitamin D deficiency disease   • Altered mental status   • Tremor   • Seizure ([facility])   • Stage 3a chronic kidney disease ([facility])   • Thoracic degenerative disc disease   • Metabolic encephalopathy   • VIPUL (acute kidney injury) ([facility])   • Hyperglycemia   • Type II diabetes mellitus, uncontrolled   • Lower abdominal pain   • Transaminitis   • COVID-19 virus detected   • UTI (urinary tract infection)   • Emphysematous cystitis   • Choledocholithiasis   • Hypokalemia   • Hypoxia   • Generalized abdominal pain   • History of Clostridium difficile infection   • History of ERCP   • Acute UTI (urinary tract infection)   • Hypomagnesemia   • Burning pain   • Anxiety disorder   • Neuropathic pain   • Intertrigo   • Weakness of both lower extremities   • Acute metabolic encephalopathy   • Accelerated hypertension   • Acute cystitis without hematuria   • Altered mental status, unspecified altered mental status type   • Left  lower lobe pneumonia   • Diarrhea     Past Medical History:   Diagnosis Date   • Anxiety    • Arthritis    • Benign essential hypertension 08/20/2014   • 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HCC)    • Depression    • Diabetes (HCC)    • Diabetes mellitus, type 2 (HCC)    • Disc degeneration, lumbar    • Headache, tension-type    • Hyperlipidemia    • Hypothyroidism    • Neuropathy    • Osteoporosis 09/09/2015   • Peripheral neuropathy    • Rotator cuff tear, left    • Scoliosis    • Shoulder pain     LEFT, TORN ROTATOR CUFF S/P FALL   • Spinal stenosis      Past Surgical History:   Procedure Laterality Date   • APPENDECTOMY     • BILATERAL BREAST REDUCTION Bilateral 08/2015   • CATARACT EXTRACTION  03/2015   • CHOLECYSTECTOMY WITH INTRAOPERATIVE CHOLANGIOGRAM N/A 3/27/2022    Procedure: CHOLECYSTECTOMY LAPAROSCOPIC INTRAOPERATIVE CHOLANGIOGRAM;  Surgeon: Aiyana Hill MD;  [facility] OR;  Service: General;  Laterality: N/A;   • COLONOSCOPY  06/05/2015    WNL   • ERCP N/A 2/25/2022    Procedure: ENDOSCOPIC RETROGRADE CHOLANGIOPANCREATOGRAPHY with sphincterotomy and balloon sweep;  Surgeon: Chilo Wilhelm MD;  Location: [facility] ENDOSCOPY;  Service: Gastroenterology;  Laterality: N/A;  PRE/POST - CBD stones   • ERCP N/A 3/28/2022    Procedure: ENDOSCOPIC RETROGRADE CHOLANGIOPANCREATOGRAPHY WITH SPHINCTEROTOMY AND BALLOON SWEEP;  Surgeon: Chilo Wilhelm MD;  Location: [facility] ENDOSCOPY;  Service: Gastroenterology;  Laterality: N/A;  PRE: COMMON DUCT STONE  POST: COMMON DUCT STONE   • KYPHOPLASTY     • REDUCTION MAMMAPLASTY     • TONSILLECTOMY        General Information     Row Name 11/08/22 1605          Physical Therapy Time and Intention    Document Type therapy note (daily note)  -EJ     Mode of Treatment physical therapy  -     Row Name 11/08/22 1605          General Information    Existing Precautions/Restrictions fall  -EJ           User Key  (r) = Recorded By, (t) = Taken By, (c) = Cosigned By    Initials  Name Provider Type    Vashti Collier, PT Physical Therapist               Mobility     Row Name 11/08/22 1606          Bed Mobility    Comment, (Bed Mobility) up in chair  -EJ     Row Name 11/08/22 1606          Sit-Stand Transfer    Sit-Stand [place] (Transfers) verbal cues;minimum assist (75% patient effort);2 person assist  -EJ     Assistive Device (Sit-Stand Transfers) walker, front-wheeled  -EJ     Comment, (Sit-Stand Transfer) cues for upright posture and hand placement w sit <> stand  -EJ     Row Name 11/08/22 1606          Gait/Stairs (Locomotion)    [place] Level (Gait) verbal cues;minimum assist (75% patient effort);contact guard  -EJ     Assistive Device (Gait) walker, front-wheeled  -EJ     Distance in Feet (Gait) 20  -EJ     Deviations/Abnormal Patterns (Gait) tess decreased;stride length decreased;gait speed decreased  -EJ     Bilateral Gait Deviations forward flexed posture;heel strike decreased  -EJ     Comment, (Gait/Stairs) very forward flexed, cues for upright posture, mild unsteadiness  -EJ           User Key  (r) = Recorded By, (t) = Taken By, (c) = Cosigned By    Initials Name Provider Type    Vashti Collier, PT Physical Therapist               Obj/Interventions    No documentation.                Goals/Plan    No documentation.                Clinical Impression     Row Name 11/08/22 1610          Pain    Pretreatment Pain Rating 0/10 - no pain  -     Row Name 11/08/22 1610          Plan of Care Review    Plan of Care Reviewed With patient  -EJ     Progress improving  -EJ     Outcome Evaluation Pt seen for PT this afternoon. She is up in chair upon entry to room. Pt has no reports of pain at this time. She was able to stand w min A x 2. Verbal cues for upright posture w standing and ambulation. She did increase ambulation distance today, ambulating approx 25 ft w Rwx and CGA/min A. she does exhibit slow pace w decreased step length. She is slightly unsteady at  times although no overt LOB noted. Pt returned to chair at end of session. Plans for rehab when bed available. Will continue to progress activity as tolerated.  -     Row Name 11/08/22 1610          Positioning and Restraints    Pre-Treatment Position sitting in chair/recliner  -EJ     Post Treatment Position chair  -EJ     In Chair notified nsg;sitting;call light within reach;encouraged to call for assist;exit alarm on;with nsg  -EJ           User Key  (r) = Recorded By, (t) = Taken By, (c) = Cosigned By    Initials Name Provider Type    Vashti Collier, PT Physical Therapist               Outcome Measures     Row Name 11/08/22 1613 11/08/22 0832       How much help from another person do you currently need...    Turning from your back to your side while in flat bed without using bedrails? 3  -EJ 4  -LH    Moving from lying on back to sitting on the side of a flat bed without bedrails? 3  -EJ 3  -LH    Moving to and from a bed to a chair (including a wheelchair)? 3  -EJ 3  -LH    Standing up from a chair using your arms (e.g., wheelchair, bedside chair)? 3  -EJ 3  -LH    Climbing 3-5 steps with a railing? 2  -EJ 2  -LH    To walk in hospital room? 3  -EJ 3  -LH    AM-PAC 6 Clicks Score (PT) 17  -EJ 18  -LH    Highest level of mobility 5 --> Static standing  -EJ 6 --> Walked 10 steps or more  -          User Key  (r) = Recorded By, (t) = Taken By, (c) = Cosigned By    Initials Name Provider Type    Vashti Collier, PT Physical Therapist     Ileana Holt, RN Registered Nurse                             Physical Therapy Education     Title: PT OT SLP Therapies (Done)     Topic: Physical Therapy (Done)     Point: Mobility training (Done)     Learning Progress Summary           Patient Acceptance, E,TB, VU by MIKAEL at 11/8/2022 0605    Acceptance, E,TB,D, VU,NR by JONNA at 11/7/2022 1157    Acceptance, E,TB,D, VU,DU,NR by  at 11/5/2022 2110    Acceptance, E, VU,NR by YARELY at 11/5/2022 1809                    Point: Home exercise program (Done)     Learning Progress Summary           Patient Acceptance, E,TB, VU by  at 11/8/2022 0605    Acceptance, E,TB,D, VU,NR by  at 11/7/2022 1157    Acceptance, E,TB,D, VU,DU,NR by  at 11/5/2022 2110    Acceptance, E, VU,NR by  at 11/5/2022 1809                   Point: Body mechanics (Done)     Learning Progress Summary           Patient Acceptance, E,TB, VU by  at 11/8/2022 0605    Acceptance, E,TB,D, VU,NR by  at 11/7/2022 1157    Acceptance, E,TB,D, VU,DU,NR by  at 11/5/2022 2110    Acceptance, E, VU,NR by  at 11/5/2022 1809                   Point: Precautions (Done)     Learning Progress Summary           Patient Acceptance, E,TB, VU by  at 11/8/2022 0605    Acceptance, E,TB,D, VU,NR by  at 11/7/2022 1157    Acceptance, E,TB,D, VU,DU,NR by  at 11/5/2022 2110    Acceptance, E, VU,NR by  at 11/5/2022 1809                               User Key     Initials Effective Dates Name Provider Type Discipline     06/16/21 -  Isaura Naidu, PT Physical Therapist PT     06/16/21 -  Marion Owusu, RN Registered Nurse Nurse     03/07/18 -  Savita Ray PTA Physical Therapist Assistant PT     10/01/20 -  Debora Willoughby, RN Registered Nurse Nurse              PT Recommendation and Plan     Plan of Care Reviewed With: patient  Progress: improving  Outcome Evaluation: Pt seen for PT this afternoon. She is up in chair upon entry to room. Pt has no reports of pain at this time. She was able to stand w min A x 2. Verbal cues for upright posture w standing and ambulation. She did increase ambulation distance today, ambulating approx 25 ft w Rwx and CGA/min A. she does exhibit slow pace w decreased step length. She is slightly unsteady at times although no overt LOB noted. Pt returned to chair at end of session. Plans for rehab when bed available. Will continue to progress activity as tolerated.     Time Calculation:    PT Charges     Row Name 11/08/22  1613             Time Calculation    Start Time 1546  -EJ      Stop Time 1558  -EJ      Time Calculation (min) 12 min  -EJ      PT Received On 11/08/22  -EJ      PT - Next Appointment 11/09/22  -EJ            User Key  (r) = Recorded By, (t) = Taken By, (c) = Cosigned By    Initials Name Provider Type    Vashti Collier, PT Physical Therapist              Therapy Charges for Today     Code Description Service Date Service Provider Modifiers Qty    76459854701 HC PT THER PROC EA 15 MIN 11/8/2022 Vashti Salomon, PT GP 1    18357792735 HC PT THER SUPP EA 15 MIN 11/8/2022 Vashti Salomon, PT GP 1          PT G-Codes  Outcome Measure Options: AM-PAC 6 Clicks Daily Activity (OT), Modified [place]  AM-PAC 6 Clicks Score (PT): 17  AM-PAC 6 Clicks Score (OT): 18  Modified [place] Scale: 3 - Moderate disability.  Requiring some help, but able to walk without assistance.    Vashti Salomon, PT  11/8/2022

## 2022-11-08 NOTE — PLAN OF CARE
Goal Outcome Evaluation:  Plan of Care Reviewed With: patient           Outcome Evaluation: Pt up in chair at start of shift, moved to bed, changed brief, with small BM, up with walker, given scheduled meds with no issue, purewick in place over night, blood sugar monitoring with  sliding scale Insulin as ordered. no c/o nausea. tolerating diet.

## 2022-11-08 NOTE — PLAN OF CARE
Goal Outcome Evaluation:   Awaiting bed at facility - available Thursday. Vitals stable. Accucheck TID AC - insulin administered as ordered. Lidoderm patch in place on LLE. Up voiding w/ assist.

## 2022-11-08 NOTE — PROGRESS NOTES
Continued Stay Note  [facility]     Patient Name: Halie Guidry  MRN: [id number]  Today's Date: 11/8/2022    Admit Date: 11/3/2022    Plan: [facility] PENDING Pre-Cert. (Started today)   Discharge Plan     Row Name 11/08/22 1607       Plan    Plan [facility] PENDING Pre-Cert. (Started today)    Plan Comments Per Mame with [facility], bed available at [facility] on Thursday. She will begin Pre-cert.               Discharge Codes    No documentation.               Expected Discharge Date and Time     Expected Discharge Date Expected Discharge Time    Nov 10, 2022             Vannessa Mcdowell RN

## 2022-11-08 NOTE — PROGRESS NOTES
Name: Halie Guidry ADMIT: 11/3/2022   : 1940  PCP: Napoleon Mead MD    MRN: [id number] LOS: 4 days   AGE/SEX: 82 y.o. female  ROOM: [facility]     Subjective   Subjective   Slept better with melatonin last night. Small BM yesterday. No new issues    Review of Systems   Constitutional: Negative for fever.   HENT: Negative for congestion.    Respiratory: Negative for shortness of breath.    Cardiovascular: Negative for chest pain.   Gastrointestinal: Negative for constipation.   Genitourinary: Negative for difficulty urinating.   Musculoskeletal: Negative for arthralgias.   Skin: Negative for rash.   Neurological: Negative for headaches.   Psychiatric/Behavioral: Negative for sleep disturbance.        Objective   Objective   Vital Signs  Temp:  [97.1 °F (36.2 °C)-98.1 °F (36.7 °C)] 97.1 °F (36.2 °C)  Heart Rate:  [54-59] 55  Resp:  [16-20] 16  BP: (132-165)/(72-78) 157/73  SpO2:  [97 %-100 %] 98 %  on   ;   Device (Oxygen Therapy): room air  Body mass index is 23.22 kg/m².  Physical Exam  Vitals and nursing note reviewed.   Constitutional:       General: She is not in acute distress.  HENT:      Head: Normocephalic.      Mouth/Throat:      Mouth: Mucous membranes are moist.   Eyes:      Conjunctiva/sclera: Conjunctivae normal.   Cardiovascular:      Rate and Rhythm: Normal rate and regular rhythm.   Pulmonary:      Effort: Pulmonary effort is normal. No respiratory distress.      Breath sounds: Examination of the right-lower field reveals decreased breath sounds. Examination of the left-lower field reveals decreased breath sounds. Decreased breath sounds present.   Abdominal:      General: Bowel sounds are normal.      Palpations: Abdomen is soft.   Musculoskeletal:      Cervical back: Neck supple.      Right lower leg: No edema.      Left lower leg: No edema.   Skin:     General: Skin is warm and dry.   Neurological:      Mental Status: She is alert and oriented to person, place, and time.    Psychiatric:         Mood and Affect: Mood normal.         Behavior: Behavior normal.       Results Review     I reviewed the patient's new clinical results.  Results from last 7 days   Lab Units 11/07/22 0458 11/05/22 0643 11/04/22  0801 11/03/22  2311   WBC 10*3/mm3 10.49 8.24 10.03 11.81*   HEMOGLOBIN g/dL 12.0 11.4* 12.2 12.6   PLATELETS 10*3/mm3 289 278 286 355     Results from last 7 days   Lab Units 11/07/22 0458 11/05/22 0643 11/04/22 0801 11/03/22  2311   SODIUM mmol/L 139 141 138 135*   POTASSIUM mmol/L 3.7 4.3 4.4 4.9   CHLORIDE mmol/L 102 108* 105 103   CO2 mmol/L 25.3 22.3 17.2* 20.7*   BUN mg/dL 23 40* 67* 77*   CREATININE mg/dL 1.08* 0.88 1.32* 1.69*   GLUCOSE mg/dL 196* 220* 234* 341*   EGFR mL/min/1.73 51.4* 65.7 40.4* 30.0*     Results from last 7 days   Lab Units 11/03/22  2311   ALBUMIN g/dL 4.20   BILIRUBIN mg/dL 0.3   ALK PHOS U/L 110   AST (SGOT) U/L 13   ALT (SGPT) U/L 10     Results from last 7 days   Lab Units 11/07/22 0458 11/05/22 0643 11/04/22 0801 11/03/22  2311   CALCIUM mg/dL 9.1 9.6 8.9 9.1   ALBUMIN g/dL  --   --   --  4.20   MAGNESIUM mg/dL  --   --   --  2.4     Results from last 7 days   Lab Units 11/03/22 2327 11/03/22  2311   PROCALCITONIN ng/mL  --  0.06   LACTATE mmol/L 1.8  --      Glucose   Date/Time Value Ref Range Status   11/08/2022 0609 126 70 - 130 mg/dL Final     Comment:     Meter: [id number] : [id number] Irineo KILLIAN MARY   11/07/2022 2126 155 (H) 70 - 130 mg/dL Final     Comment:     Meter: [id number] : [id number] Irineo KILLIAN NA   11/07/2022 1644 156 (H) 70 - 130 mg/dL Final     Comment:     Meter: [id number] : [id number] Day Meri NA   11/07/2022 1152 205 (H) 70 - 130 mg/dL Final     Comment:     Meter: [id number] : [id number] Day Meri NA   11/07/2022 0643 175 (H) 70 - 130 mg/dL Final     Comment:     Meter: [id number] : [id number] Perry Hernandez NA   11/06/2022 2210 282 (H) 70 - 130 mg/dL Final     Comment:     Meter:  [id number] : [id number] Sidney HERNANDEZ   11/06/2022 1629 77 70 - 130 mg/dL Final     Comment:     Meter: [id number] : [id number] Zachary Aguileraanda AYAKA       No radiology results for the last day  Scheduled Medications  amLODIPine, 10 mg, Oral, Q24H  aspirin, 81 mg, Oral, Daily  atorvastatin, 80 mg, Oral, Nightly  bisoprolol, 5 mg, Oral, Daily  cefdinir, 300 mg, Oral, Q12H  chlorthalidone, 25 mg, Oral, Daily  DULoxetine, 30 mg, Oral, Daily  enoxaparin, 40 mg, Subcutaneous, Daily  HYDROcodone-acetaminophen, 1 tablet, Oral, TID  insulin glargine, 40 Units, Subcutaneous, Nightly  insulin lispro, 0-7 Units, Subcutaneous, 4x Daily With Meals & Nightly  insulin lispro, 8 Units, Subcutaneous, TID With Meals  levothyroxine, 112 mcg, Oral, Daily  lidocaine, 1 patch, Transdermal, Q24H  lisinopril, 10 mg, Oral, Q24H  melatonin, 3 mg, Oral, Nightly  pantoprazole, 40 mg, Oral, QAM  pregabalin, 50 mg, Oral, TID  sodium chloride, 10 mL, Intravenous, Q12H  cyanocobalamin, 1,000 mcg, Oral, Daily    Infusions   Diet  Diet Regular; Cardiac, Consistent Carbohydrate, Renal, GI Soft       Assessment/Plan     Active Hospital Problems    Diagnosis  POA   • **Left lower lobe pneumonia [J18.9]  Unknown   • Diarrhea [R19.7]  Unknown   • Acute metabolic encephalopathy [G93.41]  Yes   • VIPUL (acute kidney injury) (HCC) [N17.9]  Yes   • Type 2 diabetes mellitus, with long-term current use of insulin (HCC) [E11.9, Z79.4]  Not Applicable   • Benign essential hypertension [I10]  Yes   • Stage 3a chronic kidney disease (HCC) [N18.31]  Yes      Resolved Hospital Problems   No resolved problems to display.       82 y.o. female admitted with Left lower lobe pneumonia.    82 yr old female w/hx of HTN, CKD III, DM admitted w/metabolic encephalopathy due to LLL pneumonia. She also had diarrhea and VIPUL, now resolved    LLL pneumonia on CXR:  -minimal respiratory symptoms  -Urine antigens, MRSA screen, RVP negative. BC have remained no growth  -Lactate  and procal wnl; WBC has normalized  -Continue antibiotics, now po    LLE pain:  -present since fall in June  -Xrays and doppler negative  -Ortho has evaluated. Nothing surgical. Conservative management of pain. F/U outpatient if needed    Diarrhea:  -likely secondary to pneumonia  -resolved    VIPUL/CKD III w/metabolic acidosis/HTN:  -likey due to diarrhea causing volume depletion  -treated w/fluids, Cr normalized  -Diuretic and ACE-I were held but resumed 11/5 (ACE at lower dose)    Metabolic encephalopathy:  -has hx of this in past  -CTH neg  -Mental status back to baseline    Hypothyroidism:  -levothyroxine    DM/neuropathy:  -A1C 7.70% in Sept  -Continue mealtime, long acting and correctional scale insulin    Pt now agreeable to SNF    · Lovenox 40 mg SC daily for DVT prophylaxis.  · Limited code (no CPR, no intubation).  · Discussed with patient and nursing staff.  · Anticipate discharge to SNU facility once arrangements have been made.      SERGE Andrews  [facility]  11/08/22  11:29 EST  '

## 2022-11-08 NOTE — PLAN OF CARE
Goal Outcome Evaluation:  Plan of Care Reviewed With: patient        Progress: improving  Outcome Evaluation: Pt seen for PT this afternoon. She is up in chair upon entry to room. Pt has no reports of pain at this time. She was able to stand w min A x 2. Verbal cues for upright posture w standing and ambulation. She did increase ambulation distance today, ambulating approx 25 ft w Rwx and CGA/min A. she does exhibit slow pace w decreased step length. She is slightly unsteady at times although no overt LOB noted. Pt returned to chair at end of session. Plans for rehab when bed available. Will continue to progress activity as tolerated.

## 2022-11-08 NOTE — PLAN OF CARE
Goal Outcome Evaluation:              Outcome Evaluation: Pt up in chair most of the day. Up to BSC and BR with assist and walker. Patient has decided that she may go to a rehab facility upon discharge. Note sent to CCP. C/O headache, tylenol givne with positive results. Scheduled norco/lyrica for chronic back pain.

## 2022-11-09 LAB
BACTERIA SPEC AEROBE CULT: NORMAL
BACTERIA SPEC AEROBE CULT: NORMAL
GLUCOSE BLDC GLUCOMTR-MCNC: 116 MG/DL (ref 70–130)
GLUCOSE BLDC GLUCOMTR-MCNC: 127 MG/DL (ref 70–130)
GLUCOSE BLDC GLUCOMTR-MCNC: 150 MG/DL (ref 70–130)
GLUCOSE BLDC GLUCOMTR-MCNC: 163 MG/DL (ref 70–130)

## 2022-11-09 PROCEDURE — 63710000001 INSULIN LISPRO (HUMAN) PER 5 UNITS: Performed by: HOSPITALIST

## 2022-11-09 PROCEDURE — 25010000002 ENOXAPARIN PER 10 MG: Performed by: HOSPITALIST

## 2022-11-09 PROCEDURE — 97535 SELF CARE MNGMENT TRAINING: CPT

## 2022-11-09 PROCEDURE — 82962 GLUCOSE BLOOD TEST: CPT

## 2022-11-09 PROCEDURE — 97530 THERAPEUTIC ACTIVITIES: CPT

## 2022-11-09 RX ADMIN — ACETAMINOPHEN 650 MG: 325 TABLET, FILM COATED ORAL at 12:18

## 2022-11-09 RX ADMIN — Medication 10 ML: at 08:10

## 2022-11-09 RX ADMIN — BISOPROLOL FUMARATE 5 MG: 5 TABLET, FILM COATED ORAL at 08:08

## 2022-11-09 RX ADMIN — HYDROCODONE BITARTRATE AND ACETAMINOPHEN 1 TABLET: 5; 325 TABLET ORAL at 16:34

## 2022-11-09 RX ADMIN — INSULIN LISPRO 2 UNITS: 100 INJECTION, SOLUTION INTRAVENOUS; SUBCUTANEOUS at 18:17

## 2022-11-09 RX ADMIN — DULOXETINE HYDROCHLORIDE 30 MG: 30 CAPSULE, DELAYED RELEASE ORAL at 08:08

## 2022-11-09 RX ADMIN — INSULIN GLARGINE-YFGN 40 UNITS: 100 INJECTION, SOLUTION SUBCUTANEOUS at 21:12

## 2022-11-09 RX ADMIN — INSULIN LISPRO 2 UNITS: 100 INJECTION, SOLUTION INTRAVENOUS; SUBCUTANEOUS at 12:15

## 2022-11-09 RX ADMIN — CEFDINIR 300 MG: 300 CAPSULE ORAL at 21:13

## 2022-11-09 RX ADMIN — HYDROCODONE BITARTRATE AND ACETAMINOPHEN 1 TABLET: 5; 325 TABLET ORAL at 21:14

## 2022-11-09 RX ADMIN — ASPIRIN 81 MG: 81 TABLET, COATED ORAL at 08:09

## 2022-11-09 RX ADMIN — INSULIN LISPRO 8 UNITS: 100 INJECTION, SOLUTION INTRAVENOUS; SUBCUTANEOUS at 12:15

## 2022-11-09 RX ADMIN — LISINOPRIL 10 MG: 10 TABLET ORAL at 08:08

## 2022-11-09 RX ADMIN — PREGABALIN 50 MG: 50 CAPSULE ORAL at 16:34

## 2022-11-09 RX ADMIN — CEFDINIR 300 MG: 300 CAPSULE ORAL at 08:08

## 2022-11-09 RX ADMIN — PREGABALIN 50 MG: 50 CAPSULE ORAL at 08:09

## 2022-11-09 RX ADMIN — PANTOPRAZOLE SODIUM 40 MG: 40 TABLET, DELAYED RELEASE ORAL at 06:49

## 2022-11-09 RX ADMIN — ENOXAPARIN SODIUM 40 MG: 100 INJECTION SUBCUTANEOUS at 08:09

## 2022-11-09 RX ADMIN — PREGABALIN 50 MG: 50 CAPSULE ORAL at 21:13

## 2022-11-09 RX ADMIN — ATORVASTATIN CALCIUM 80 MG: 20 TABLET, FILM COATED ORAL at 21:13

## 2022-11-09 RX ADMIN — HYDROCODONE BITARTRATE AND ACETAMINOPHEN 1 TABLET: 5; 325 TABLET ORAL at 08:09

## 2022-11-09 RX ADMIN — INSULIN LISPRO 8 UNITS: 100 INJECTION, SOLUTION INTRAVENOUS; SUBCUTANEOUS at 08:09

## 2022-11-09 RX ADMIN — Medication 10 ML: at 21:14

## 2022-11-09 RX ADMIN — Medication 1000 MCG: at 08:12

## 2022-11-09 RX ADMIN — CHLORTHALIDONE 25 MG: 25 TABLET ORAL at 08:08

## 2022-11-09 RX ADMIN — INSULIN LISPRO 8 UNITS: 100 INJECTION, SOLUTION INTRAVENOUS; SUBCUTANEOUS at 18:17

## 2022-11-09 RX ADMIN — Medication 3 MG: at 21:13

## 2022-11-09 RX ADMIN — AMLODIPINE BESYLATE 10 MG: 10 TABLET ORAL at 08:08

## 2022-11-09 RX ADMIN — LEVOTHYROXINE SODIUM 112 MCG: 0.11 TABLET ORAL at 06:49

## 2022-11-09 NOTE — PLAN OF CARE
Goal Outcome Evaluation:  Plan of Care Reviewed With: patient        Progress: improving  Outcome Evaluation: VSS, up in chair, BM this shift, voiding freely, up to BSC w/ assist x1, accumax on bed and waffle boots ordered, heels floated off bed, tolerating regular diet, son at bedside this afternoon, scheduled pain meds as ordered, on room air, saline locked, possible discharge tomorrow. Pt inquired about transporation to facility, CCP aware.

## 2022-11-09 NOTE — THERAPY TREATMENT NOTE
Patient Name: Halie Guidry  : 1940    MRN: [id number]                              Today's Date: 2022       Admit Date: 11/3/2022    Visit Dx:     ICD-10-CM ICD-9-CM   1. Altered mental status, unspecified altered mental status type  R41.82 780.97   2. Pneumonia of left lower lobe due to infectious organism  J18.9 486   3. Hyperglycemia  R73.9 790.29     Patient Active Problem List   Diagnosis   • Compression fracture   • Lumbar degenerative disc disease   • Type 2 diabetes mellitus, with long-term current use of insulin ([facility])   • Hyperlipidemia   • Benign essential hypertension   • Primary hypothyroidism   • Leukocytosis   • Neuropathy   • Osteoporosis   • Proteinuria   • Tobacco abuse   • Diabetic eye exam ([facility])   • Generalized weakness   • Spinal stenosis   • Scoliosis   • Arthritis   • VBI (vertebrobasilar insufficiency)   • Orthostatic hypotension   • 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 ([facility])   • Severe hypothyroidism   • Noncompliance with medication regimen   • Vitamin D deficiency disease   • Altered mental status   • Tremor   • Seizure ([facility])   • Stage 3a chronic kidney disease ([facility])   • Thoracic degenerative disc disease   • Metabolic encephalopathy   • VIPUL (acute kidney injury) ([facility])   • Hyperglycemia   • Type II diabetes mellitus, uncontrolled   • Lower abdominal pain   • Transaminitis   • COVID-19 virus detected   • UTI (urinary tract infection)   • Emphysematous cystitis   • Choledocholithiasis   • Hypokalemia   • Hypoxia   • Generalized abdominal pain   • History of Clostridium difficile infection   • History of ERCP   • Acute UTI (urinary tract infection)   • Hypomagnesemia   • Burning pain   • Anxiety disorder   • Neuropathic pain   • Intertrigo   • Weakness of both lower extremities   • Acute metabolic encephalopathy   • Accelerated hypertension   • Acute cystitis without hematuria   • Altered mental status, unspecified altered mental status type   • Left  lower lobe pneumonia   • Diarrhea     Past Medical History:   Diagnosis Date   • Anxiety    • Arthritis    • Benign essential hypertension 08/20/2014   • 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HCC)    • Depression    • Diabetes (HCC)    • Diabetes mellitus, type 2 (HCC)    • Disc degeneration, lumbar    • Headache, tension-type    • Hyperlipidemia    • Hypothyroidism    • Neuropathy    • Osteoporosis 09/09/2015   • Peripheral neuropathy    • Rotator cuff tear, left    • Scoliosis    • Shoulder pain     LEFT, TORN ROTATOR CUFF S/P FALL   • Spinal stenosis      Past Surgical History:   Procedure Laterality Date   • APPENDECTOMY     • BILATERAL BREAST REDUCTION Bilateral 08/2015   • CATARACT EXTRACTION  03/2015   • CHOLECYSTECTOMY WITH INTRAOPERATIVE CHOLANGIOGRAM N/A 3/27/2022    Procedure: CHOLECYSTECTOMY LAPAROSCOPIC INTRAOPERATIVE CHOLANGIOGRAM;  Surgeon: Aiyana Hill MD;  Location: [facility] MAIN OR;  Service: General;  Laterality: N/A;   • COLONOSCOPY  06/05/2015    WNL   • ERCP N/A 2/25/2022    Procedure: ENDOSCOPIC RETROGRADE CHOLANGIOPANCREATOGRAPHY with sphincterotomy and balloon sweep;  Surgeon: Chilo Wilhelm MD;  Location: [facility] ENDOSCOPY;  Service: Gastroenterology;  Laterality: N/A;  PRE/POST - CBD stones   • ERCP N/A 3/28/2022    Procedure: ENDOSCOPIC RETROGRADE CHOLANGIOPANCREATOGRAPHY WITH SPHINCTEROTOMY AND BALLOON SWEEP;  Surgeon: Chilo Wilhelm MD;  Location: [facility] ENDOSCOPY;  Service: Gastroenterology;  Laterality: N/A;  PRE: COMMON DUCT STONE  POST: COMMON DUCT STONE   • KYPHOPLASTY     • REDUCTION MAMMAPLASTY     • TONSILLECTOMY        General Information     Row Name 11/09/22 1456          OT Time and Intention    Document Type therapy note (daily note)  -CE     Mode of Treatment individual therapy;occupational therapy  -CE     Row Name 11/09/22 1456          General Information    Patient Profile Reviewed yes  -CE     Row Name 11/09/22 1456          Cognition    Orientation Status (Cognition)  oriented x 4  -CE     Row Name 11/09/22 [facility]          Safety Issues, Functional Mobility    Impairments Affecting Function (Mobility) balance;endurance/activity tolerance;shortness of breath  -CE           User Key  (r) = Recorded By, (t) = Taken By, (c) = Cosigned By    Initials Name Provider Type    CE Unique Yang OT Occupational Therapist                 Mobility/ADL's     Row Name 11/09/22 [facility]          Bed Mobility    Comment, (Bed Mobility) pt OOBTC prior to session  -     Row Name 11/09/22 1456          Transfers    Transfers sit-stand transfer;stand-sit transfer  -     Row Name 11/09/22 [facility]          Sit-Stand Transfer    Sit-Stand [place] (Transfers) minimum assist (75% patient effort);verbal cues;1 person assist  -CE     Assistive Device (Sit-Stand Transfers) walker, front-wheeled  -CE     Comment, (Sit-Stand Transfer) x2 trials  -     Row Name 11/09/22 [facility]          Stand-Sit Transfer    Stand-Sit [place] (Transfers) contact guard;1 person assist;nonverbal cues (demo/gesture)  -     Assistive Device (Stand-Sit Transfers) walker, front-wheeled  -CE     Row Name 11/09/22 [facility]          Functional Mobility    Functional Mobility- Comment Pt able to ambulate to/from bathroom with CGA x 1 using FWW. Pt able to ambulate into bathroom and stand at sink for 6 min conescutively then required extended seated rest break prior to ambulating back to recliner. Required CGA x 1 with FWW to ambulate back to recliner  -     Row Name 11/09/22 [facility]          Activities of Daily Living    BADL Assessment/Intervention grooming  -     Row Name 11/09/22 [facility]          Grooming Assessment/Training    [place] Level (Grooming) hair care, combing/brushing;oral care regimen;contact guard assist;set up  -CE     Position (Grooming) sink side;supported standing  -           User Key  (r) = Recorded By, (t) = Taken By, (c) = Cosigned By    Initials Name Provider Type    Unique Juarez OT  "Occupational Therapist               Obj/Interventions    No documentation.                Goals/Plan    No documentation.                Clinical Impression     Row Name 11/09/22 1459          Pain Assessment    Pretreatment Pain Rating 0/10 - no pain  -CE     Posttreatment Pain Rating 0/10 - no pain  -CE     Row Name 11/09/22 1459          Plan of Care Review    Plan of Care Reviewed With patient  -CE     Outcome Evaluation Pt seen this date for OT treatment. Pt agreeable to ambulate to bathroom for standing level ADLs at sink. Able to stand for duration of 6 min total piror to seated rest break. Pt verbalizing frustrations and sadness with \"getting old\" and losing her close friend in September. OT educating pt on benefits of discussing with medical team and pt reported she has feels it is being well-managed. Will con't to follow for stated goals and recommend d/c to SNF.  -CE     Row Name 11/09/22 1459          Vital Signs    O2 Delivery Pre Treatment room air  -CE     O2 Delivery Post Treatment room air  -CE     Row Name 11/09/22 1459          Positioning and Restraints    Pre-Treatment Position sitting in chair/recliner  -CE     Post Treatment Position chair  -CE     In Chair notified nsg;reclined;call light within reach;encouraged to call for assist;exit alarm on;legs elevated;heels elevated  -CE           User Key  (r) = Recorded By, (t) = Taken By, (c) = Cosigned By    Initials Name Provider Type    CE Unique Yang OT Occupational Therapist               Outcome Measures     Row Name 11/09/22 8615          How much help from another is currently needed...    Putting on and taking off regular lower body clothing? 2  -CE     Bathing (including washing, rinsing, and drying) 3  -CE     Toileting (which includes using toilet bed pan or urinal) 3  -CE     Putting on and taking off regular upper body clothing 3  -CE     Taking care of personal grooming (such as brushing teeth) 3  -CE     Eating meals 4  -CE  "    AM-PAC 6 Clicks Score (OT) 18  -CE     Row Name 11/09/22 0808          How much help from another person do you currently need...    Turning from your back to your side while in flat bed without using bedrails? 3  -JS     Moving from lying on back to sitting on the side of a flat bed without bedrails? 3  -JS     Moving to and from a bed to a chair (including a wheelchair)? 3  -JS     Standing up from a chair using your arms (e.g., wheelchair, bedside chair)? 3  -JS     Climbing 3-5 steps with a railing? 2  -JS     To walk in hospital room? 3  -JS     AM-PAC 6 Clicks Score (PT) 17  -JS     Highest level of mobility 5 --> Static standing  -JS     Row Name 11/09/22 1502          Functional Assessment    Outcome Measure Options AM-PAC 6 Clicks Daily Activity (OT)  -CE           User Key  (r) = Recorded By, (t) = Taken By, (c) = Cosigned By    Initials Name Provider Type    Kenya Henriquez, RN Registered Nurse    Unique Juarez OT Occupational Therapist                Occupational Therapy Education     Title: PT OT SLP Therapies (Done)     Topic: Occupational Therapy (Done)     Point: ADL training (Done)     Description:   Instruct learner(s) on proper safety adaptation and remediation techniques during self care or transfers.   Instruct in proper use of assistive devices.              Learning Progress Summary           Patient Acceptance, E,TB, VU by MIKAEL at 11/8/2022 0605    Acceptance, E, VU by  at 11/7/2022 1320    Comment: role of OT, d/c rec    Acceptance, E,TB,D, VU,DU,NR by  at 11/5/2022 2110                   Point: Home exercise program (Done)     Description:   Instruct learner(s) on appropriate technique for monitoring, assisting and/or progressing therapeutic exercises/activities.              Learning Progress Summary           Patient Acceptance, E,TB, VU by MIKAEL at 11/8/2022 0605    Acceptance, E,TB,D, VU,DU,NR by  at 11/5/2022 2110                   Point: Precautions (Done)      "Description:   Instruct learner(s) on prescribed precautions during self-care and functional transfers.              Learning Progress Summary           Patient Acceptance, E,TB, VU by MIKAEL at 11/8/2022 0605    Acceptance, E, VU by JENNIFER at 11/7/2022 1320    Comment: role of OT, d/c rec    Acceptance, E,TB,D, VU,DU,NR by  at 11/5/2022 2110                   Point: Body mechanics (Done)     Description:   Instruct learner(s) on proper positioning and spine alignment during self-care, functional mobility activities and/or exercises.              Learning Progress Summary           Patient Acceptance, E,TB, VU by MIKAEL at 11/8/2022 0605    Acceptance, E, VU by JENNIFER at 11/7/2022 1320    Comment: role of OT, d/c rec    Acceptance, E,TB,D, VU,DU,NR by  at 11/5/2022 2110                               User Key     Initials Effective Dates Name Provider Type Discipline     06/16/21 -  Marion Owusu, RN Registered Nurse Nurse    MIKAEL 10/01/20 -  Debora Willoughby RN Registered Nurse Nurse     08/20/21 -  Arcelia Valentin OT Occupational Therapist OT              OT Recommendation and Plan     Plan of Care Review  Plan of Care Reviewed With: patient  Outcome Evaluation: Pt seen this date for OT treatment. Pt agreeable to ambulate to bathroom for standing level ADLs at sink. Able to stand for duration of 6 min total piror to seated rest break. Pt verbalizing frustrations and sadness with \"getting old\" and losing her close friend in September. OT educating pt on benefits of discussing with medical team and pt reported she has feels it is being well-managed. Will con't to follow for stated goals and recommend d/c to SNF.     Time Calculation:    Time Calculation- OT     Row Name 11/09/22 1503             Time Calculation- OT    OT Start Time 1327  -CE      OT Stop Time 1405  -CE      OT Time Calculation (min) 38 min  -CE      Total Timed Code Minutes- OT 38 minute(s)  -CE      OT Received On 11/09/22  -CE      OT - Next Appointment " 11/10/22  -CE         Timed Charges    48808 - OT Therapeutic Activity Minutes 30  -CE      45885 - OT Self Care/Mgmt Minutes 8  -CE         Total Minutes    Timed Charges Total Minutes 38  -CE       Total Minutes 38  -CE            User Key  (r) = Recorded By, (t) = Taken By, (c) = Cosigned By    Initials Name Provider Type    Unique Juarez OT Occupational Therapist              Therapy Charges for Today     Code Description Service Date Service Provider Modifiers Qty    [id number]  OT THERAPEUTIC ACT EA 15 MIN 11/9/2022 Unique Yang OT GO 2    [id number] HC OT SELF CARE/MGMT/TRAIN EA 15 MIN 11/9/2022 Uinque Yang OT GO 1               Unique Yang OT  11/9/2022

## 2022-11-09 NOTE — PROGRESS NOTES
Dedicated to Hospital Care    [phone]   LOS: 5 days     Name: Halie Guidry  Age/Sex: 82 y.o. female  :  1940        PCP: Napoleon Mead MD  Chief Complaint   Patient presents with   • Hyperglycemia   • Altered Mental Status      Subjective   Complaining about sleep issues again last night.  She said the melatonin was not very helpful.  Otherwise ate well for breakfast today denies other new issues or complaints right now.  Denies her GI symptoms or complaints    General: No Fever or Chills, Cardiac: No Chest Pain or Palpitations, Resp: No Cough or SOA, GI: No Nausea, Vomiting, or Diarrhea and Other: No bleeding    amLODIPine, 10 mg, Oral, Q24H  aspirin, 81 mg, Oral, Daily  atorvastatin, 80 mg, Oral, Nightly  bisoprolol, 5 mg, Oral, Daily  cefdinir, 300 mg, Oral, Q12H  chlorthalidone, 25 mg, Oral, Daily  DULoxetine, 30 mg, Oral, Daily  enoxaparin, 40 mg, Subcutaneous, Daily  HYDROcodone-acetaminophen, 1 tablet, Oral, TID  insulin glargine, 40 Units, Subcutaneous, Nightly  insulin lispro, 0-7 Units, Subcutaneous, 4x Daily With Meals & Nightly  insulin lispro, 8 Units, Subcutaneous, TID With Meals  levothyroxine, 112 mcg, Oral, Daily  lidocaine, 1 patch, Transdermal, Q24H  lisinopril, 10 mg, Oral, Q24H  melatonin, 3 mg, Oral, Nightly  pantoprazole, 40 mg, Oral, QAM  pregabalin, 50 mg, Oral, TID  sodium chloride, 10 mL, Intravenous, Q12H  cyanocobalamin, 1,000 mcg, Oral, Daily           Objective   Vital Signs  Temp:  [97.5 °F (36.4 °C)-97.9 °F (36.6 °C)] 97.6 °F (36.4 °C)  Heart Rate:  [52-59] 55  Resp:  [16] 16  BP: (115-164)/(64-72) 134/70  Body mass index is 23.22 kg/m².    Intake/Output Summary (Last 24 hours) at 2022 1532  Last data filed at 2022 1256  Gross per 24 hour   Intake 780 ml   Output 400 ml   Net 380 ml       Physical Exam  Vitals and nursing note reviewed.   Constitutional:       Appearance: Normal appearance.   Cardiovascular:      Rate and Rhythm: Normal rate and  regular rhythm.   Pulmonary:      Effort: No respiratory distress.      Breath sounds: Normal breath sounds.   Abdominal:      General: Bowel sounds are normal.      Palpations: Abdomen is soft.   Neurological:      General: No focal deficit present.      Mental Status: She is alert and oriented to person, place, and time.           Results Review:       I reviewed the patient's new clinical results.  Results from last 7 days   Lab Units 11/07/22 0458 11/05/22 0643 11/04/22 0801 11/03/22  2311   WBC 10*3/mm3 10.49 8.24 10.03 11.81*   HEMOGLOBIN g/dL 12.0 11.4* 12.2 12.6   PLATELETS 10*3/mm3 289 278 286 355     Results from last 7 days   Lab Units 11/07/22 0458 11/05/22 0643 11/04/22 0801 11/03/22  2311   SODIUM mmol/L 139 141 138 135*   POTASSIUM mmol/L 3.7 4.3 4.4 4.9   CHLORIDE mmol/L 102 108* 105 103   CO2 mmol/L 25.3 22.3 17.2* 20.7*   BUN mg/dL 23 40* 67* 77*   CREATININE mg/dL 1.08* 0.88 1.32* 1.69*   CALCIUM mg/dL 9.1 9.6 8.9 9.1   MAGNESIUM mg/dL  --   --   --  2.4   Estimated Creatinine Clearance: 46 mL/min (A) (by C-G formula based on SCr of 1.08 mg/dL (H)).  Lab Results   Component Value Date    HGBA1C 7.70 (H) 09/01/2022    HGBA1C 7.60 (H) 06/23/2022    HGBA1C 7.60 (H) 06/23/2022     Glucose   Date/Time Value Ref Range Status   11/09/2022 1056 163 (H) 70 - 130 mg/dL Final     Comment:     Meter: [id number] : [id number] Day Meri NA   11/09/2022 0620 116 70 - 130 mg/dL Final     Comment:     Meter: [id number] : [id number] Irineo Monroeia CONSTANTIN NA   11/08/2022 2120 107 70 - 130 mg/dL Final     Comment:     Meter: [id number] : [id number] [place] Melissa M NA   11/08/2022 1630 218 (H) 70 - 130 mg/dL Final     Comment:     Meter: [id number] : [id number] [place] Aletha CNA   11/08/2022 1139 155 (H) 70 - 130 mg/dL Final     Comment:     Meter: [id number] : [id number] Nabila Jerez NA   11/08/2022 0609 126 70 - 130 mg/dL Final     Comment:     Meter: [id number] : [id number] Irineo  Melissa KILLIAN NA   11/07/2022 2126 155 (H) 70 - 130 mg/dL Final     Comment:     Meter: [id number] : [id number] Irineo KILLIAN NA   11/07/2022 1644 156 (H) 70 - 130 mg/dL Final     Comment:     Meter: [id number] : [id number] Nabila Jerez MARY         Assessment & Plan   Active Hospital Problems    Diagnosis  POA   • **Left lower lobe pneumonia [J18.9]  Unknown   • Diarrhea [R19.7]  Unknown   • Acute metabolic encephalopathy [G93.41]  Yes   • VIPUL (acute kidney injury) (HCC) [N17.9]  Yes   • Type 2 diabetes mellitus, with long-term current use of insulin (HCC) [E11.9, Z79.4]  Not Applicable   • Benign essential hypertension [I10]  Yes   • Stage 3a chronic kidney disease (HCC) [N18.31]  Yes      Resolved Hospital Problems   No resolved problems to display.       PLAN  This is an 82-year-old female with a history of hypertension chronic kidney disease stage IIIa, type 2 diabetes who presented to the hospital with confusion and elevated blood sugars and was found to have acute on chronic renal failure, pneumonia and underlying diarrhea  -We discussed sleep hygiene today.  Nor her melatonin to be all that effective sleep hygiene is still very important.  Encouraged her to get out of bed today is much as possible up in the chair next to the open window.  No also encourage her to not eat her meals while laying in bed.  -Remains on oral antibiotics for the underlying pneumonia nearing completion of treatment  -Creatinine back to baseline renal function remained stable we will recheck BMP in the morning.  -Blood sugars currently stable    Disposition  Hopeful plan to discharge to skilled nursing facility tomorrow      Nolan Case MD  [facility]  11/09/22  15:32 EST

## 2022-11-09 NOTE — PLAN OF CARE
Goal Outcome Evaluation:           Progress: improving  Outcome Evaluation: Patient A&O, VSS, on room air, Blood sugars monitored, up with assist x 1, tolerating diet, no c/o nausea noted, brief and purwick utilized at night, falls precautions and seizure precautions maintained.

## 2022-11-09 NOTE — PLAN OF CARE
"Goal Outcome Evaluation:  Plan of Care Reviewed With: patient           Outcome Evaluation: Pt seen this date for OT treatment. Pt agreeable to ambulate to bathroom for standing level ADLs at sink. Able to stand for duration of 6 min total piror to seated rest break. Pt verbalizing frustrations and sadness with \"getting old\" and losing her close friend in September. OT educating pt on benefits of discussing with medical team and pt reported she has feels it is being well-managed. Will con't to follow for stated goals and recommend d/c to SNF.  "

## 2022-11-09 NOTE — NURSING NOTE
WOCN Consult: Heels blanchable. Currently offloaded on pillow. Please apply heel boots. Instructed to patient importance to elevate heels. Verbalizes understanding.

## 2022-11-09 NOTE — CASE MANAGEMENT/SOCIAL WORK
Continued Stay Note  [facility]     Patient Name: Halie Guidry  MRN: [id number]  Today's Date: 11/9/2022    Admit Date: 11/3/2022    Plan: [facility]- pre cert remains pending, bed available 11/10   Discharge Plan     Row Name 11/09/22 1551       Plan    Plan [facility]- pre cert remains pending, bed available 11/10    Patient/Family in Agreement with Plan yes    Plan Comments CCP spoke with Ortiz, patient's pre cert remains pending to [facility]. CCP updated patient who states she will need transportation arranged. CCP pre-arranged a [employer] van tomorrow at 4PM. Packet in CCP office. CCP to follow. Isabel VILLA RN CCP               Discharge Codes    No documentation.               Expected Discharge Date and Time     Expected Discharge Date Expected Discharge Time    Nov 10, 2022             Isabel Banda RN

## 2022-11-10 LAB
ALBUMIN SERPL-MCNC: 3.5 G/DL (ref 3.5–5.2)
ANION GAP SERPL CALCULATED.3IONS-SCNC: 13 MMOL/L (ref 5–15)
BUN SERPL-MCNC: 46 MG/DL (ref 8–23)
BUN/CREAT SERPL: 35.7 (ref 7–25)
CALCIUM SPEC-SCNC: 8.5 MG/DL (ref 8.6–10.5)
CHLORIDE SERPL-SCNC: 98 MMOL/L (ref 98–107)
CO2 SERPL-SCNC: 22 MMOL/L (ref 22–29)
CREAT SERPL-MCNC: 1.29 MG/DL (ref 0.57–1)
EGFRCR SERPLBLD CKD-EPI 2021: 41.5 ML/MIN/1.73
GLUCOSE BLDC GLUCOMTR-MCNC: 172 MG/DL (ref 70–130)
GLUCOSE BLDC GLUCOMTR-MCNC: 187 MG/DL (ref 70–130)
GLUCOSE BLDC GLUCOMTR-MCNC: 211 MG/DL (ref 70–130)
GLUCOSE BLDC GLUCOMTR-MCNC: 221 MG/DL (ref 70–130)
GLUCOSE SERPL-MCNC: 191 MG/DL (ref 65–99)
MAGNESIUM SERPL-MCNC: 1.8 MG/DL (ref 1.6–2.4)
PHOSPHATE SERPL-MCNC: 5.5 MG/DL (ref 2.5–4.5)
POTASSIUM SERPL-SCNC: 3.8 MMOL/L (ref 3.5–5.2)
SODIUM SERPL-SCNC: 133 MMOL/L (ref 136–145)
URATE SERPL-MCNC: 7.9 MG/DL (ref 2.4–5.7)

## 2022-11-10 PROCEDURE — 25010000002 ENOXAPARIN PER 10 MG: Performed by: HOSPITALIST

## 2022-11-10 PROCEDURE — 82962 GLUCOSE BLOOD TEST: CPT

## 2022-11-10 PROCEDURE — 84550 ASSAY OF BLOOD/URIC ACID: CPT | Performed by: HOSPITALIST

## 2022-11-10 PROCEDURE — 80069 RENAL FUNCTION PANEL: CPT | Performed by: HOSPITALIST

## 2022-11-10 PROCEDURE — 63710000001 INSULIN LISPRO (HUMAN) PER 5 UNITS: Performed by: HOSPITALIST

## 2022-11-10 PROCEDURE — 97110 THERAPEUTIC EXERCISES: CPT

## 2022-11-10 PROCEDURE — 83735 ASSAY OF MAGNESIUM: CPT | Performed by: HOSPITALIST

## 2022-11-10 PROCEDURE — 97530 THERAPEUTIC ACTIVITIES: CPT

## 2022-11-10 RX ORDER — SODIUM CHLORIDE 9 MG/ML
100 INJECTION, SOLUTION INTRAVENOUS CONTINUOUS
Status: DISCONTINUED | OUTPATIENT
Start: 2022-11-10 | End: 2022-11-11 | Stop reason: HOSPADM

## 2022-11-10 RX ORDER — SODIUM CHLORIDE 9 MG/ML
125 INJECTION, SOLUTION INTRAVENOUS CONTINUOUS
Status: DISCONTINUED | OUTPATIENT
Start: 2022-11-10 | End: 2022-11-10

## 2022-11-10 RX ADMIN — AMLODIPINE BESYLATE 10 MG: 10 TABLET ORAL at 12:57

## 2022-11-10 RX ADMIN — ASPIRIN 81 MG: 81 TABLET, COATED ORAL at 08:29

## 2022-11-10 RX ADMIN — CEFDINIR 300 MG: 300 CAPSULE ORAL at 22:29

## 2022-11-10 RX ADMIN — LIDOCAINE 1 PATCH: 700 PATCH TOPICAL at 22:30

## 2022-11-10 RX ADMIN — CEFDINIR 300 MG: 300 CAPSULE ORAL at 08:29

## 2022-11-10 RX ADMIN — SODIUM CHLORIDE 100 ML/HR: 9 INJECTION, SOLUTION INTRAVENOUS at 12:47

## 2022-11-10 RX ADMIN — Medication 1000 MCG: at 08:30

## 2022-11-10 RX ADMIN — INSULIN LISPRO 8 UNITS: 100 INJECTION, SOLUTION INTRAVENOUS; SUBCUTANEOUS at 08:30

## 2022-11-10 RX ADMIN — INSULIN LISPRO 3 UNITS: 100 INJECTION, SOLUTION INTRAVENOUS; SUBCUTANEOUS at 22:30

## 2022-11-10 RX ADMIN — INSULIN LISPRO 3 UNITS: 100 INJECTION, SOLUTION INTRAVENOUS; SUBCUTANEOUS at 18:20

## 2022-11-10 RX ADMIN — LEVOTHYROXINE SODIUM 112 MCG: 0.11 TABLET ORAL at 06:44

## 2022-11-10 RX ADMIN — PREGABALIN 50 MG: 50 CAPSULE ORAL at 08:29

## 2022-11-10 RX ADMIN — INSULIN LISPRO 8 UNITS: 100 INJECTION, SOLUTION INTRAVENOUS; SUBCUTANEOUS at 12:47

## 2022-11-10 RX ADMIN — ACETAMINOPHEN 650 MG: 325 TABLET, FILM COATED ORAL at 01:27

## 2022-11-10 RX ADMIN — Medication 10 ML: at 08:31

## 2022-11-10 RX ADMIN — BISOPROLOL FUMARATE 5 MG: 5 TABLET, FILM COATED ORAL at 12:57

## 2022-11-10 RX ADMIN — INSULIN GLARGINE-YFGN 40 UNITS: 100 INJECTION, SOLUTION SUBCUTANEOUS at 22:38

## 2022-11-10 RX ADMIN — ENOXAPARIN SODIUM 40 MG: 100 INJECTION SUBCUTANEOUS at 08:29

## 2022-11-10 RX ADMIN — HYDROCODONE BITARTRATE AND ACETAMINOPHEN 1 TABLET: 5; 325 TABLET ORAL at 22:30

## 2022-11-10 RX ADMIN — SODIUM CHLORIDE 100 ML/HR: 9 INJECTION, SOLUTION INTRAVENOUS at 22:43

## 2022-11-10 RX ADMIN — PANTOPRAZOLE SODIUM 40 MG: 40 TABLET, DELAYED RELEASE ORAL at 06:44

## 2022-11-10 RX ADMIN — INSULIN LISPRO 8 UNITS: 100 INJECTION, SOLUTION INTRAVENOUS; SUBCUTANEOUS at 18:20

## 2022-11-10 RX ADMIN — INSULIN LISPRO 2 UNITS: 100 INJECTION, SOLUTION INTRAVENOUS; SUBCUTANEOUS at 12:47

## 2022-11-10 RX ADMIN — PREGABALIN 50 MG: 50 CAPSULE ORAL at 16:36

## 2022-11-10 RX ADMIN — Medication 10 ML: at 22:31

## 2022-11-10 RX ADMIN — ATORVASTATIN CALCIUM 80 MG: 20 TABLET, FILM COATED ORAL at 22:30

## 2022-11-10 RX ADMIN — Medication 3 MG: at 22:30

## 2022-11-10 RX ADMIN — PREGABALIN 50 MG: 50 CAPSULE ORAL at 22:30

## 2022-11-10 RX ADMIN — INSULIN LISPRO 2 UNITS: 100 INJECTION, SOLUTION INTRAVENOUS; SUBCUTANEOUS at 08:30

## 2022-11-10 RX ADMIN — HYDROCODONE BITARTRATE AND ACETAMINOPHEN 1 TABLET: 5; 325 TABLET ORAL at 08:30

## 2022-11-10 RX ADMIN — HYDROCODONE BITARTRATE AND ACETAMINOPHEN 1 TABLET: 5; 325 TABLET ORAL at 16:36

## 2022-11-10 RX ADMIN — DULOXETINE HYDROCHLORIDE 30 MG: 30 CAPSULE, DELAYED RELEASE ORAL at 08:29

## 2022-11-10 NOTE — PLAN OF CARE
Goal Outcome Evaluation:  Plan of Care Reviewed With: patient        Progress: declining  Outcome Evaluation: VSS, purewick in place, up in chair for most of shift, accuchecks and sliding scale coverage as needed, lovenox for DVT prevention, pain controlled w/ scheduled meds, IVFs as ordered, pt to remain in hospital for a couple more days d/t kidney function, hopeful for d/c in next couple days.

## 2022-11-10 NOTE — PLAN OF CARE
Goal Outcome Evaluation:  Plan of Care Reviewed With: patient        Progress: improving  Outcome Evaluation: Pt seen this date for OT tx session, pt not leaving this date, probably tomorrow. Pt supine in bed, agreeable to OOB activity. Bed mob completed with SBA, STS x2 from EOB with min A and Rwx, pt completed func mob trial, within room, simulating household distances with CGA using Rwx. slow pace with no LOBs however. Pt UIC at end of mobility, g/h completed with s/up and UE AROM completed in shoulder/elbow planes. Pt will continue to benefit from skilled OT to addres noted func deficits and progress toward stated goals.

## 2022-11-10 NOTE — PROGRESS NOTES
Name: Halie Guidry ADMIT: 11/3/2022   : 1940  PCP: Napoleon Mead MD    MRN: [id number] LOS: 6 days   AGE/SEX: 82 y.o. female  ROOM: [facility]     Subjective   Subjective     No events overnight. No complaints. Discussed with CCP and she has a bed at rehab, but precertification is pending       Objective   Objective   Vital Signs  Temp:  [96.4 °F (35.8 °C)-97.5 °F (36.4 °C)] 96.4 °F (35.8 °C)  Heart Rate:  [52-64] 56  Resp:  [16-18] 18  BP: (105-153)/(53-76) 153/69  SpO2:  [93 %-98 %] 98 %  on   ;   Device (Oxygen Therapy): room air  Body mass index is 23.22 kg/m².  Physical Exam  Constitutional:       General: She is not in acute distress.     Appearance: She is not toxic-appearing.   Cardiovascular:      Rate and Rhythm: Normal rate and regular rhythm.      Heart sounds: Normal heart sounds.   Pulmonary:      Effort: Pulmonary effort is normal.      Breath sounds: Normal breath sounds.   Abdominal:      General: Bowel sounds are normal.      Palpations: Abdomen is soft.   Musculoskeletal:         General: No tenderness.      Right lower leg: No edema.      Left lower leg: No edema.   Neurological:      Mental Status: She is alert.   Psychiatric:         Mood and Affect: Mood normal.         Behavior: Behavior normal.         Results Review     I reviewed the patient's new clinical results.  Results from last 7 days   Lab Units 22  0458 2243 22  0801 22  2311   WBC 10*3/mm3 10.49 8.24 10.03 11.81*   HEMOGLOBIN g/dL 12.0 11.4* 12.2 12.6   PLATELETS 10*3/mm3 289 278 286 355     Results from last 7 days   Lab Units 11/10/22  0550 22  0458 22  0643 22  0801   SODIUM mmol/L 133* 139 141 138   POTASSIUM mmol/L 3.8 3.7 4.3 4.4   CHLORIDE mmol/L 98 102 108* 105   CO2 mmol/L 22.0 25.3 22.3 17.2*   BUN mg/dL 46* 23 40* 67*   CREATININE mg/dL 1.29* 1.08* 0.88 1.32*   GLUCOSE mg/dL 191* 196* 220* 234*   Estimated Creatinine Clearance: 38.5 mL/min (A) (by C-G  formula based on SCr of 1.29 mg/dL (H)).  Results from last 7 days   Lab Units 11/10/22  0550 11/03/22  2311   ALBUMIN g/dL 3.50 4.20   BILIRUBIN mg/dL  --  0.3   ALK PHOS U/L  --  110   AST (SGOT) U/L  --  13   ALT (SGPT) U/L  --  10     Results from last 7 days   Lab Units 11/10/22  0550 11/07/22  0458 11/05/22  0643 11/04/22  0801 11/03/22  2311   CALCIUM mg/dL 8.5* 9.1 9.6 8.9 9.1   ALBUMIN g/dL 3.50  --   --   --  4.20   MAGNESIUM mg/dL 1.8  --   --   --  2.4   PHOSPHORUS mg/dL 5.5*  --   --   --   --      Results from last 7 days   Lab Units 11/03/22  2327 11/03/22  2311   PROCALCITONIN ng/mL  --  0.06   LACTATE mmol/L 1.8  --      COVID19   Date Value Ref Range Status   11/03/2022 Not Detected Not Detected - Ref. Range Final   06/22/2022 Not Detected Not Detected - Ref. Range Final   05/26/2022 Not Detected Not Detected - Ref. Range Final   03/26/2022 Not Detected Not Detected - Ref. Range Final   02/23/2022 Detected (C) Not Detected - Ref. Range Final     Glucose   Date/Time Value Ref Range Status   11/10/2022 0643 172 (H) 70 - 130 mg/dL Final     Comment:     Meter: [id number] : [id number] Sidney Malcolm NA   11/09/2022 2112 127 70 - 130 mg/dL Final     Comment:     Meter: [id number] : [id number] Sidney Malcolm NA   11/09/2022 1607 150 (H) 70 - 130 mg/dL Final     Comment:     Meter: [id number] : [id number] Day Meri NA   11/09/2022 1056 163 (H) 70 - 130 mg/dL Final     Comment:     Meter: [id number] : [id number] Day Meri NA   11/09/2022 0620 116 70 - 130 mg/dL Final     Comment:     Meter: [id number] : [id number] Irineo KILLIAN NA   11/08/2022 2120 107 70 - 130 mg/dL Final     Comment:     Meter: [id number] : [id number] Irineo KILLIAN NA   11/08/2022 1630 218 (H) 70 - 130 mg/dL Final     Comment:     Meter: [id number] : [id number] Zachary Aguileraanda CNA       Duplex Venous Lower Extremity - Left CAR  •  Normal left lower extremity venous duplex scan.  XR Knee 1 or 2 View  Left  LEFT KNEE     HISTORY: Knee pain, fall.     AP and lateral views left knee demonstrate moderate loss of joint space  involving the medial and lateral compartments. Moderate patellofemoral  degenerative disease is noted. There is no evidence of fracture.  Extensive vascular calcification is noted.     This report was finalized on 11/7/2022 6:10 AM by Dr. Nolan Melgar M.D.     XR Spine Lumbar Complete With Flex & Ext  LUMBAR SPINE COMPLETE WITH FLEXION-EXTENSION     HISTORY: Left shin pain, possible radiculopathy.     COMPARISON: MRI lumbar spine 06/23/2022.     FINDINGS: The patient is status post kyphoplasty at L3. There is a  severe compression deformity involving L3 as well as anterior bridging  osteophyte at L2-L3 similar to the MRI examination of 06/23/2022.  Moderate-to-severe loss of disc height and vacuum disc effect is  appreciated at L4-L5 as well as moderate endplate degenerative changes.  No obvious acute fracture is identified. There was no evidence of  subluxation in flexion or extension. Severe osteoarthritis involving the  right hip is noted with complete loss of joint space, sclerosis and  subchondral cyst formation. The etiology for the patient's left  radiculopathy is not established. Further evaluation could be performed  with a MRI examination of the lumbar spine.     This report was finalized on 11/7/2022 6:09 AM by Dr. Nolan Melgar M.D.       Scheduled Medications  amLODIPine, 10 mg, Oral, Q24H  aspirin, 81 mg, Oral, Daily  atorvastatin, 80 mg, Oral, Nightly  bisoprolol, 5 mg, Oral, Daily  cefdinir, 300 mg, Oral, Q12H  DULoxetine, 30 mg, Oral, Daily  enoxaparin, 40 mg, Subcutaneous, Daily  HYDROcodone-acetaminophen, 1 tablet, Oral, TID  insulin glargine, 40 Units, Subcutaneous, Nightly  insulin lispro, 0-7 Units, Subcutaneous, 4x Daily With Meals & Nightly  insulin lispro, 8 Units, Subcutaneous, TID With Meals  levothyroxine, 112 mcg, Oral, Daily  lidocaine, 1 patch, Transdermal,  Q24H  melatonin, 3 mg, Oral, Nightly  pantoprazole, 40 mg, Oral, QAM  pregabalin, 50 mg, Oral, TID  sodium chloride, 10 mL, Intravenous, Q12H  cyanocobalamin, 1,000 mcg, Oral, Daily    Infusions  sodium chloride, 100 mL/hr    Diet  Diet Regular; Cardiac, Consistent Carbohydrate, Renal, GI Soft       Assessment/Plan     Active Hospital Problems    Diagnosis  POA   • **Left lower lobe pneumonia [J18.9]  Yes   • Diarrhea [R19.7]  Yes   • Acute metabolic encephalopathy [G93.41]  Yes   • VIPUL (acute kidney injury) (HCC) [N17.9]  Yes   • Type 2 diabetes mellitus, with long-term current use of insulin (HCC) [E11.9, Z79.4]  Not Applicable   • Benign essential hypertension [I10]  Yes   • Stage 3a chronic kidney disease (HCC) [N18.31]  Yes      Resolved Hospital Problems   No resolved problems to display.       82 y.o. female admitted with Left lower lobe pneumonia.    · LLL pneumonia-completing a course of antibiotics. Currently on oral cefdinir.  · VIPUL on CKD 3-creatinine has been trending back up for the past couple of days. Baseline creatinine appears to be between 1-1.1. currently 1.3. will give some gentle fluids and hold lisinopril and chlorthalidone. Would like to see creatinine stabilize prior to discharge.  · Diarrhea-resolved  · Metabolic encephalopathy-secondary to the above. Resolved.  · Type 2 dm on insulin-glucose is at goal.   · Essential hypertension-bp adequately controlled acutely. Holding lisinopril and chlorthalidone as above  · GERD-PPI  · Hypothyroidism-synthroid  · Hyperlipidemia-statin  · Lovenox 40 mg SC daily for DVT prophylaxis.  · Limited code (no CPR, no intubation).  · Discussed with patient and CCP.  · Anticipate discharge to SNU facility in 1-2 days.      Kain Morgan MD  [facility]  11/10/22  10:59 EST    I wore protective equipment throughout this patient encounter including a face mask, gloves and protective eyewear.  Hand hygiene was performed before donning  protective equipment and after removal when leaving the room.

## 2022-11-10 NOTE — THERAPY TREATMENT NOTE
Patient Name: Halie Guidry  : 1940    MRN: [id number]                              Today's Date: 11/10/2022       Admit Date: 11/3/2022    Visit Dx:     ICD-10-CM ICD-9-CM   1. Altered mental status, unspecified altered mental status type  R41.82 780.97   2. Pneumonia of left lower lobe due to infectious organism  J18.9 486   3. Hyperglycemia  R73.9 790.29   4. Type 2 diabetes mellitus with other circulatory complication, with long-term current use of insulin ([facility])  E11.59 250.70    Z79.4 V58.67     Patient Active Problem List   Diagnosis   • Compression fracture   • Lumbar degenerative disc disease   • Type 2 diabetes mellitus, with long-term current use of insulin ([facility])   • Hyperlipidemia   • Benign essential hypertension   • Primary hypothyroidism   • Leukocytosis   • Neuropathy   • Osteoporosis   • Proteinuria   • Tobacco abuse   • Diabetic eye exam ([facility])   • Generalized weakness   • Spinal stenosis   • Scoliosis   • Arthritis   • VBI (vertebrobasilar insufficiency)   • Orthostatic hypotension   • 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 ([facility])   • Severe hypothyroidism   • Noncompliance with medication regimen   • Vitamin D deficiency disease   • Altered mental status   • Tremor   • Seizure ([facility])   • Stage 3a chronic kidney disease ([facility])   • Thoracic degenerative disc disease   • Metabolic encephalopathy   • VIPUL (acute kidney injury) ([facility])   • Hyperglycemia   • Type II diabetes mellitus, uncontrolled   • Lower abdominal pain   • Transaminitis   • COVID-19 virus detected   • UTI (urinary tract infection)   • Emphysematous cystitis   • Choledocholithiasis   • Hypokalemia   • Hypoxia   • Generalized abdominal pain   • History of Clostridium difficile infection   • History of ERCP   • Acute UTI (urinary tract infection)   • Hypomagnesemia   • Burning pain   • Anxiety disorder   • Neuropathic pain   • Intertrigo   • Weakness of both lower extremities   • Acute metabolic encephalopathy    • Accelerated hypertension   • Acute cystitis without hematuria   • Altered mental status, unspecified altered mental status type   • Left lower lobe pneumonia   • Diarrhea     Past Medical History:   Diagnosis Date   • Anxiety    • Arthritis    • Benign essential hypertension 08/20/2014   • 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HCC)    • Depression    • Diabetes (HCC)    • Diabetes mellitus, type 2 (HCC)    • Disc degeneration, lumbar    • Headache, tension-type    • Hyperlipidemia    • Hypothyroidism    • Neuropathy    • Osteoporosis 09/09/2015   • Peripheral neuropathy    • Rotator cuff tear, left    • Scoliosis    • Shoulder pain     LEFT, TORN ROTATOR CUFF S/P FALL   • Spinal stenosis      Past Surgical History:   Procedure Laterality Date   • APPENDECTOMY     • BILATERAL BREAST REDUCTION Bilateral 08/2015   • CATARACT EXTRACTION  03/2015   • CHOLECYSTECTOMY WITH INTRAOPERATIVE CHOLANGIOGRAM N/A 3/27/2022    Procedure: CHOLECYSTECTOMY LAPAROSCOPIC INTRAOPERATIVE CHOLANGIOGRAM;  Surgeon: Aiyana Hill MD;  Location: [facility] MAIN OR;  Service: General;  Laterality: N/A;   • COLONOSCOPY  06/05/2015    WNL   • ERCP N/A 2/25/2022    Procedure: ENDOSCOPIC RETROGRADE CHOLANGIOPANCREATOGRAPHY with sphincterotomy and balloon sweep;  Surgeon: Chilo Wilhelm MD;  Location: [facility] ENDOSCOPY;  Service: Gastroenterology;  Laterality: N/A;  PRE/POST - CBD stones   • ERCP N/A 3/28/2022    Procedure: ENDOSCOPIC RETROGRADE CHOLANGIOPANCREATOGRAPHY WITH SPHINCTEROTOMY AND BALLOON SWEEP;  Surgeon: Chilo Wilhelm MD;  Location: [facility] ENDOSCOPY;  Service: Gastroenterology;  Laterality: N/A;  PRE: COMMON DUCT STONE  POST: COMMON DUCT STONE   • KYPHOPLASTY     • REDUCTION MAMMAPLASTY     • TONSILLECTOMY        General Information     Row Name 11/10/22 1458          OT Time and Intention    Document Type therapy note (daily note)  -     Mode of Treatment occupational therapy;individual therapy  -     Row Name 11/10/22 1458           General Information    Patient Profile Reviewed yes  -     Existing Precautions/Restrictions fall  -     Row Name 11/10/22 1458          Cognition    Orientation Status (Cognition) oriented x 4  -[facility] Name 11/10/22 1458          Safety Issues, Functional Mobility    Impairments Affecting Function (Mobility) balance;endurance/activity tolerance;strength  -           User Key  (r) = Recorded By, (t) = Taken By, (c) = Cosigned By    Initials Name Provider Type     Arcelia Valentin OT Occupational Therapist                 Mobility/ADL's     Row Name 11/10/22 1458          Bed Mobility    Bed Mobility supine-sit  -     Supine-Sit [place] (Bed Mobility) standby assist  -     Sit-Supine [place] (Bed Mobility) not tested  -     Comment, (Bed Mobility) UIC at end of session  -[facility] Name 11/10/22 1458          Transfers    Transfers sit-stand transfer  -[facility] Name 11/10/22 1458          Sit-Stand Transfer    Sit-Stand [place] (Transfers) minimum assist (75% patient effort);1 person assist;verbal cues  -     Comment, (Sit-Stand Transfer) x2 from EOB  -[facility] Name 11/10/22 1458          Functional Mobility    Functional Mobility- Ind. Level contact guard assist  -     Functional Mobility- Device walker, front-wheeled  -     Functional Mobility- Comment pt able to ambulate to doorway and return to other side of room to chair, CGA with RWx, slow pace, flexed posture however pt reports this is baseline, pt declined up to BR this date, reports difficulty with BMs. UIC at end of mobility trial  -     Row Name 11/10/22 1458          Activities of Daily Living    BADL Assessment/Intervention toileting;grooming  -[facility] Name 11/10/22 1458          Toileting Assessment/Training    Comment, (Toileting) brief donned  -[facility] Name 11/10/22 1458          Grooming Assessment/Training    [place] Level (Grooming) hair care, combing/brushing;set up  -     Position  (Grooming) supported sitting  -     Comment, (Grooming) UIC  -           User Key  (r) = Recorded By, (t) = Taken By, (c) = Cosigned By    Initials Name Provider Type    Arcelia Wills OT Occupational Therapist               Obj/Interventions     Row Name 11/10/22 1500          Shoulder (Therapeutic Exercise)    Shoulder (Therapeutic Exercise) AROM (active range of motion)  -     Shoulder AROM (Therapeutic Exercise) horizontal aBduction/aDduction;2 sets;10 repetitions;sitting  -     Row Name 11/10/22 1500          Elbow/Forearm (Therapeutic Exercise)    Elbow/Forearm (Therapeutic Exercise) AROM (active range of motion)  -     Elbow/Forearm AROM (Therapeutic Exercise) bilateral;flexion;extension;sitting;20 repititions;2 sets  -     Row Name 11/10/22 1500          Motor Skills    Therapeutic Exercise shoulder;elbow/forearm  -[facility] Name 11/10/22 1500          Balance    Balance Assessment sitting static balance;sitting dynamic balance;sit to stand dynamic balance;standing static balance;standing dynamic balance  -MW     Static Sitting Balance standby assist  -MW     Dynamic Sitting Balance standby assist  -     Position, Sitting Balance sitting edge of bed;sitting in chair  -     Sit to Stand Dynamic Balance minimal assist  -MW     Static Standing Balance contact guard  -MW     Dynamic Standing Balance contact guard  -MW     Position/Device Used, Standing Balance supported;walker, front-wheeled  -     Comment, Balance no overt [facility]  -           User Key  (r) = Recorded By, (t) = Taken By, (c) = Cosigned By    Initials Name Provider Type    Arcelia Wills OT Occupational Therapist               Goals/Plan    No documentation.                Clinical Impression     Row Name 11/10/22 1501          Pain Assessment    Pretreatment Pain Rating 0/10 - no pain  -MW     Posttreatment Pain Rating 0/10 - no pain  -MW     Row Name 11/10/22 1501          Plan of Care Review    Plan of Care  Reviewed With patient  -MW     Progress improving  -MW     Outcome Evaluation Pt seen this date for OT tx session, pt not leaving this date, probably tomorrow. Pt supine in bed, agreeable to OOB activity. Bed mob completed with SBA, STS x2 from EOB with min A and Rwx, pt completed func mob trial, within room, simulating household distances with CGA using Rwx. slow pace with no LOBs however. Pt UIC at end of mobility, g/h completed with s/up and UE AROM completed in shoulder/elbow planes. Pt will continue to benefit from skilled OT to addres noted func deficits and progress toward stated goals.  -     Row Name 11/10/22 1501          Therapy Plan Review/Discharge Plan (OT)    Anticipated Discharge Disposition (OT) skilled nursing facility  -     Row Name 11/10/22 1501          Vital Signs    O2 Delivery Intra Treatment room air  -MW     Pre Patient Position Supine  -MW     Intra Patient Position Standing  -MW     Post Patient Position Sitting  -MW     Row Name 11/10/22 1501          Positioning and Restraints    Pre-Treatment Position in bed  -MW     Post Treatment Position chair  -MW     In Chair notified nsg;reclined;call light within reach;encouraged to call for assist;exit alarm on  -MW           User Key  (r) = Recorded By, (t) = Taken By, (c) = Cosigned By    Initials Name Provider Type    Arcelia Wills, TOMÁS Occupational Therapist               Outcome Measures     Row Name 11/10/22 1503          How much help from another is currently needed...    Putting on and taking off regular lower body clothing? 2  -MW     Bathing (including washing, rinsing, and drying) 3  -MW     Toileting (which includes using toilet bed pan or urinal) 3  -MW     Putting on and taking off regular upper body clothing 3  -MW     Taking care of personal grooming (such as brushing teeth) 3  -MW     Eating meals 4  -MW     AM-PAC 6 Clicks Score (OT) 18  -MW     Row Name 11/10/22 0298          How much help from another person do  you currently need...    Turning from your back to your side while in flat bed without using bedrails? 3  -JS     Moving from lying on back to sitting on the side of a flat bed without bedrails? 3  -JS     Moving to and from a bed to a chair (including a wheelchair)? 3  -JS     Standing up from a chair using your arms (e.g., wheelchair, bedside chair)? 3  -JS     Climbing 3-5 steps with a railing? 2  -JS     To walk in hospital room? 3  -JS     AM-PAC 6 Clicks Score (PT) 17  -JS     Highest level of mobility 5 --> Static standing  -JS     Row Name 11/10/22 1504          Functional Assessment    Outcome Measure Options AM-PAC 6 Clicks Daily Activity (OT)  -           User Key  (r) = Recorded By, (t) = Taken By, (c) = Cosigned By    Initials Name Provider Type    Kenya Henriquez, RN Registered Nurse    Arcelia Wills OT Occupational Therapist                Occupational Therapy Education     Title: PT OT SLP Therapies (Done)     Topic: Occupational Therapy (Done)     Point: ADL training (Done)     Description:   Instruct learner(s) on proper safety adaptation and remediation techniques during self care or transfers.   Instruct in proper use of assistive devices.              Learning Progress Summary           Patient Acceptance, E,TB, VU by MIKAEL at 11/8/2022 0605    Acceptance, E, VU by JENNIFER at 11/7/2022 1320    Comment: role of OT, d/c rec    Acceptance, E,TB,D, VU,DU,NR by  at 11/5/2022 2110                   Point: Home exercise program (Done)     Description:   Instruct learner(s) on appropriate technique for monitoring, assisting and/or progressing therapeutic exercises/activities.              Learning Progress Summary           Patient Acceptance, E,TB, VU by MIKAEL at 11/8/2022 0605    Acceptance, E,TB,D, VU,DU,NR by  at 11/5/2022 2110                   Point: Precautions (Done)     Description:   Instruct learner(s) on prescribed precautions during self-care and functional transfers.               Learning Progress Summary           Patient Acceptance, E,TB, VU by MIKAEL at 11/8/2022 0605    Acceptance, E, VU by JENNIFER at 11/7/2022 1320    Comment: role of OT, d/c rec    Acceptance, E,TB,D, VU,DU,NR by  at 11/5/2022 2110                   Point: Body mechanics (Done)     Description:   Instruct learner(s) on proper positioning and spine alignment during self-care, functional mobility activities and/or exercises.              Learning Progress Summary           Patient Acceptance, E,TB, VU by MIKAEL at 11/8/2022 0605    Acceptance, E, VU by JENNIFER at 11/7/2022 1320    Comment: role of OT, d/c rec    Acceptance, E,TB,D, VU,DU,NR by  at 11/5/2022 2110                               User Key     Initials Effective Dates Name Provider Type Discipline     06/16/21 -  Marion Owusu, RN Registered Nurse Nurse    MIKAEL 10/01/20 -  Debora Willoughby RN Registered Nurse Nurse    JENNIFER 08/20/21 -  Arcelia Valentin OT Occupational Therapist OT              OT Recommendation and Plan  Planned Therapy Interventions (OT): activity tolerance training, functional balance retraining, BADL retraining, neuromuscular control/coordination retraining, occupation/activity based interventions, ROM/therapeutic exercise, strengthening exercise, transfer/mobility retraining, patient/caregiver education/training  Therapy Frequency (OT): 5 times/wk  Plan of Care Review  Plan of Care Reviewed With: patient  Progress: improving  Outcome Evaluation: Pt seen this date for OT tx session, pt not leaving this date, probably tomorrow. Pt supine in bed, agreeable to OOB activity. Bed mob completed with SBA, STS x2 from EOB with min A and Rwx, pt completed func mob trial, within room, simulating household distances with CGA using Rwx. slow pace with no LOBs however. Pt UIC at end of mobility, g/h completed with s/up and UE AROM completed in shoulder/elbow planes. Pt will continue to benefit from skilled OT to addres noted func deficits and progress toward stated  goals.     Time Calculation:    Time Calculation- OT     Row Name 11/10/22 1505             Time Calculation- OT    OT Start Time 1305  -MW      OT Stop Time 1328  -MW      OT Time Calculation (min) 23 min  -MW      Total Timed Code Minutes- OT 23 minute(s)  -MW      OT Received On 11/10/22  -MW      OT - Next Appointment 11/11/22  -MW         Timed Charges    77634 - OT Therapeutic Exercise Minutes 8  -MW      00692 - OT Therapeutic Activity Minutes 15  -MW         Total Minutes    Timed Charges Total Minutes 23  -MW       Total Minutes 23  -MW            User Key  (r) = Recorded By, (t) = Taken By, (c) = Cosigned By    Initials Name Provider Type    MW Arcelia Valentin OT Occupational Therapist              Therapy Charges for Today     Code Description Service Date Service Provider Modifiers Qty    67021095760 HC OT THER PROC EA 15 MIN 11/10/2022 Arcelia Valentin OT GO 1    70261764915 HC OT THERAPEUTIC ACT EA 15 MIN 11/10/2022 Arcelia Valentin OT GO 1               Arcelia Valentin OT  11/10/2022

## 2022-11-10 NOTE — PLAN OF CARE
Goal Outcome Evaluation:  Plan of Care Reviewed With: patient           Outcome Evaluation: VSS, up with asst x1 and walkerwith gait belt, voiding freely, tolerating diet, blood sugars monitored with s/s insulin and lantus as needed, accumax on bed, waffle boots on feet, on room air, saline locked, given scheduled pain meds, for chronic back pain.

## 2022-11-10 NOTE — CASE MANAGEMENT/SOCIAL WORK
Continued Stay Note  [facility]     Patient Name: Halie Guidry  MRN: [id number]  Today's Date: 11/10/2022    Admit Date: 11/3/2022    Plan: [facility]- pre cert approved   Discharge Plan     Row Name 11/10/22 1123       Plan    Plan [facility]- pre cert approved    Patient/Family in Agreement with Plan yes    Plan Comments CCP notified per MD, patient not ready for d/c today. CCP notified Mame/[facility], bed available tomorrow and pre cert is approved. CCP continues to follow. Isabel VILLA RN CCP               Discharge Codes    No documentation.               Expected Discharge Date and Time     Expected Discharge Date Expected Discharge Time    Nov 10, 2022             Isabel Banda RN

## 2022-11-11 VITALS
DIASTOLIC BLOOD PRESSURE: 72 MMHG | WEIGHT: 160 LBS | HEIGHT: 70 IN | OXYGEN SATURATION: 99 % | HEART RATE: 56 BPM | RESPIRATION RATE: 16 BRPM | TEMPERATURE: 97.5 F | BODY MASS INDEX: 22.9 KG/M2 | SYSTOLIC BLOOD PRESSURE: 132 MMHG

## 2022-11-11 PROBLEM — G93.41 ACUTE METABOLIC ENCEPHALOPATHY: Status: RESOLVED | Noted: 2022-06-24 | Resolved: 2022-11-11

## 2022-11-11 PROBLEM — R19.7 DIARRHEA: Status: RESOLVED | Noted: 2022-11-04 | Resolved: 2022-11-11

## 2022-11-11 PROBLEM — N17.9 AKI (ACUTE KIDNEY INJURY): Status: RESOLVED | Noted: 2021-12-02 | Resolved: 2022-11-11

## 2022-11-11 LAB
ANION GAP SERPL CALCULATED.3IONS-SCNC: 9 MMOL/L (ref 5–15)
BUN SERPL-MCNC: 38 MG/DL (ref 8–23)
BUN/CREAT SERPL: 33.6 (ref 7–25)
CALCIUM SPEC-SCNC: 8.3 MG/DL (ref 8.6–10.5)
CHLORIDE SERPL-SCNC: 102 MMOL/L (ref 98–107)
CO2 SERPL-SCNC: 23 MMOL/L (ref 22–29)
CREAT SERPL-MCNC: 1.13 MG/DL (ref 0.57–1)
EGFRCR SERPLBLD CKD-EPI 2021: 48.7 ML/MIN/1.73
GLUCOSE BLDC GLUCOMTR-MCNC: 109 MG/DL (ref 70–130)
GLUCOSE BLDC GLUCOMTR-MCNC: 160 MG/DL (ref 70–130)
GLUCOSE SERPL-MCNC: 114 MG/DL (ref 65–99)
POTASSIUM SERPL-SCNC: 3.9 MMOL/L (ref 3.5–5.2)
SODIUM SERPL-SCNC: 134 MMOL/L (ref 136–145)

## 2022-11-11 PROCEDURE — 63710000001 INSULIN LISPRO (HUMAN) PER 5 UNITS: Performed by: HOSPITALIST

## 2022-11-11 PROCEDURE — 97116 GAIT TRAINING THERAPY: CPT

## 2022-11-11 PROCEDURE — 97110 THERAPEUTIC EXERCISES: CPT

## 2022-11-11 PROCEDURE — 25010000002 ENOXAPARIN PER 10 MG: Performed by: HOSPITALIST

## 2022-11-11 PROCEDURE — 80048 BASIC METABOLIC PNL TOTAL CA: CPT | Performed by: STUDENT IN AN ORGANIZED HEALTH CARE EDUCATION/TRAINING PROGRAM

## 2022-11-11 PROCEDURE — 82962 GLUCOSE BLOOD TEST: CPT

## 2022-11-11 RX ORDER — HYDROCODONE BITARTRATE AND ACETAMINOPHEN 5; 325 MG/1; MG/1
1 TABLET ORAL EVERY 6 HOURS PRN
Qty: 12 TABLET | Refills: 0 | Status: SHIPPED | OUTPATIENT
Start: 2022-11-11

## 2022-11-11 RX ORDER — LANOLIN ALCOHOL/MO/W.PET/CERES
3 CREAM (GRAM) TOPICAL NIGHTLY
Qty: 30 TABLET
Start: 2022-11-11

## 2022-11-11 RX ORDER — PREGABALIN 50 MG/1
50 CAPSULE ORAL 3 TIMES DAILY
Qty: 9 CAPSULE | Refills: 0 | Status: SHIPPED | OUTPATIENT
Start: 2022-11-11 | End: 2023-03-01 | Stop reason: SDUPTHER

## 2022-11-11 RX ORDER — LIDOCAINE 50 MG/G
1 PATCH TOPICAL
Qty: 4 PATCH | Refills: 0
Start: 2022-11-11 | End: 2022-11-15

## 2022-11-11 RX ORDER — INSULIN LISPRO 100 [IU]/ML
8 INJECTION, SOLUTION INTRAVENOUS; SUBCUTANEOUS
Refills: 12
Start: 2022-11-11

## 2022-11-11 RX ORDER — INSULIN LISPRO 100 [IU]/ML
0-7 INJECTION, SOLUTION INTRAVENOUS; SUBCUTANEOUS
Refills: 12
Start: 2022-11-11

## 2022-11-11 RX ADMIN — INSULIN LISPRO 8 UNITS: 100 INJECTION, SOLUTION INTRAVENOUS; SUBCUTANEOUS at 13:16

## 2022-11-11 RX ADMIN — PANTOPRAZOLE SODIUM 40 MG: 40 TABLET, DELAYED RELEASE ORAL at 06:50

## 2022-11-11 RX ADMIN — HYDROCODONE BITARTRATE AND ACETAMINOPHEN 1 TABLET: 5; 325 TABLET ORAL at 15:30

## 2022-11-11 RX ADMIN — PREGABALIN 50 MG: 50 CAPSULE ORAL at 08:29

## 2022-11-11 RX ADMIN — INSULIN LISPRO 2 UNITS: 100 INJECTION, SOLUTION INTRAVENOUS; SUBCUTANEOUS at 13:16

## 2022-11-11 RX ADMIN — ENOXAPARIN SODIUM 40 MG: 100 INJECTION SUBCUTANEOUS at 08:28

## 2022-11-11 RX ADMIN — PREGABALIN 50 MG: 50 CAPSULE ORAL at 15:30

## 2022-11-11 RX ADMIN — BISOPROLOL FUMARATE 5 MG: 5 TABLET, FILM COATED ORAL at 08:28

## 2022-11-11 RX ADMIN — INSULIN LISPRO 8 UNITS: 100 INJECTION, SOLUTION INTRAVENOUS; SUBCUTANEOUS at 08:28

## 2022-11-11 RX ADMIN — LEVOTHYROXINE SODIUM 112 MCG: 0.11 TABLET ORAL at 06:50

## 2022-11-11 RX ADMIN — Medication 1000 MCG: at 08:28

## 2022-11-11 RX ADMIN — AMLODIPINE BESYLATE 10 MG: 10 TABLET ORAL at 08:28

## 2022-11-11 RX ADMIN — ASPIRIN 81 MG: 81 TABLET, COATED ORAL at 08:28

## 2022-11-11 RX ADMIN — DULOXETINE HYDROCHLORIDE 30 MG: 30 CAPSULE, DELAYED RELEASE ORAL at 08:28

## 2022-11-11 RX ADMIN — HYDROCODONE BITARTRATE AND ACETAMINOPHEN 1 TABLET: 5; 325 TABLET ORAL at 08:28

## 2022-11-11 NOTE — THERAPY TREATMENT NOTE
Patient Name: Halie Guidry  : 1940    MRN: [id number]                              Today's Date: 2022       Admit Date: 11/3/2022    Visit Dx:     ICD-10-CM ICD-9-CM   1. Altered mental status, unspecified altered mental status type  R41.82 780.97   2. Pneumonia of left lower lobe due to infectious organism  J18.9 486   3. Hyperglycemia  R73.9 790.29   4. Type 2 diabetes mellitus with other circulatory complication, with long-term current use of insulin (Formerly [facility])  E11.59 250.70    Z79.4 V58.67   5. Metabolic encephalopathy  G93.41 348.31   6. Lumbar degenerative disc disease  M51.36 722.52     Patient Active Problem List   Diagnosis   • Compression fracture   • Lumbar degenerative disc disease   • Type 2 diabetes mellitus, with long-term current use of insulin (Formerly [facility])   • Hyperlipidemia   • Benign essential hypertension   • Primary hypothyroidism   • Leukocytosis   • Neuropathy   • Osteoporosis   • Proteinuria   • Tobacco abuse   • Diabetic eye exam (Formerly [facility])   • Generalized weakness   • Spinal stenosis   • Scoliosis   • Arthritis   • VBI (vertebrobasilar insufficiency)   • Orthostatic hypotension   • 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 (Formerly [facility])   • Severe hypothyroidism   • Noncompliance with medication regimen   • Vitamin D deficiency disease   • Altered mental status   • Tremor   • Seizure (Formerly [facility])   • Stage 3a chronic kidney disease (Formerly [facility])   • Thoracic degenerative disc disease   • Metabolic encephalopathy   • Hyperglycemia   • Type II diabetes mellitus, uncontrolled   • Lower abdominal pain   • Transaminitis   • COVID-19 virus detected   • UTI (urinary tract infection)   • Emphysematous cystitis   • Choledocholithiasis   • Hypokalemia   • Hypoxia   • Generalized abdominal pain   • History of Clostridium difficile infection   • History of ERCP   • Acute UTI (urinary tract infection)   • Hypomagnesemia   • Burning pain   • Anxiety disorder   • Neuropathic pain   • Intertrigo   • Weakness  of both lower extremities   • Accelerated hypertension   • Acute cystitis without hematuria   • Altered mental status, unspecified altered mental status type   • Left lower lobe pneumonia     Past Medical History:   Diagnosis Date   • Acute metabolic encephalopathy 6/24/2022   • Anxiety    • Arthritis    • Benign essential hypertension 08/20/2014   • 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HCC)    • Depression    • Diabetes (HCC)    • Diabetes mellitus, type 2 (HCC)    • Disc degeneration, lumbar    • Headache, tension-type    • Hyperlipidemia    • Hypothyroidism    • Neuropathy    • Osteoporosis 09/09/2015   • Peripheral neuropathy    • Rotator cuff tear, left    • Scoliosis    • Shoulder pain     LEFT, TORN ROTATOR CUFF S/P FALL   • Spinal stenosis      Past Surgical History:   Procedure Laterality Date   • APPENDECTOMY     • BILATERAL BREAST REDUCTION Bilateral 08/2015   • CATARACT EXTRACTION  03/2015   • CHOLECYSTECTOMY WITH INTRAOPERATIVE CHOLANGIOGRAM N/A 3/27/2022    Procedure: CHOLECYSTECTOMY LAPAROSCOPIC INTRAOPERATIVE CHOLANGIOGRAM;  Surgeon: Aiyana Hill MD;  Location: [facility] MAIN OR;  Service: General;  Laterality: N/A;   • COLONOSCOPY  06/05/2015    WNL   • ERCP N/A 2/25/2022    Procedure: ENDOSCOPIC RETROGRADE CHOLANGIOPANCREATOGRAPHY with sphincterotomy and balloon sweep;  Surgeon: Chilo Wilhelm MD;  Location: [facility] ENDOSCOPY;  Service: Gastroenterology;  Laterality: N/A;  PRE/POST - CBD stones   • ERCP N/A 3/28/2022    Procedure: ENDOSCOPIC RETROGRADE CHOLANGIOPANCREATOGRAPHY WITH SPHINCTEROTOMY AND BALLOON SWEEP;  Surgeon: Chilo Wilhelm MD;  Location: [facility] ENDOSCOPY;  Service: Gastroenterology;  Laterality: N/A;  PRE: COMMON DUCT STONE  POST: COMMON DUCT STONE   • KYPHOPLASTY     • REDUCTION MAMMAPLASTY     • TONSILLECTOMY        General Information     Row Name 11/11/22 1128          Physical Therapy Time and Intention    Document Type therapy note (daily note)  -     Mode of Treatment physical  therapy  -     Row Name 11/11/22 1128          General Information    Existing Precautions/Restrictions fall  -     Row Name 11/11/22 1128          Cognition    Orientation Status (Cognition) oriented x 4  -           User Key  (r) = Recorded By, (t) = Taken By, (c) = Cosigned By    Initials Name Provider Type    Savita Leonard PTA Physical Therapist Assistant               Mobility     Row Name 11/11/22 1128          Bed Mobility    Bed Mobility supine-sit  -     Supine-Sit Burke (Bed Mobility) standby assist  -     Assistive Device (Bed Mobility) bed rails;head of bed elevated  -     Row Name 11/11/22 1128          Sit-Stand Transfer    Sit-Stand Burke (Transfers) minimum assist (75% patient effort)  -     Assistive Device (Sit-Stand Transfers) walker, front-wheeled  -     Row Name 11/11/22 1128          Gait/Stairs (Locomotion)    Burke Level (Gait) contact guard;minimum assist (75% patient effort)  -     Assistive Device (Gait) walker, front-wheeled  -     Distance in Feet (Gait) 20  -SM     Deviations/Abnormal Patterns (Gait) tess decreased;stride length decreased  -     Bilateral Gait Deviations forward flexed posture  -     Comment, (Gait/Stairs) cues to keep walker closer and to correct posture  -           User Key  (r) = Recorded By, (t) = Taken By, (c) = Cosigned By    Initials Name Provider Type    Savita Leonard PTA Physical Therapist Assistant               Obj/Interventions     Row Name 11/11/22 1131          Motor Skills    Therapeutic Exercise --  seated AP, LAQ, marches, hip abd/add x10 reps  -           User Key  (r) = Recorded By, (t) = Taken By, (c) = Cosigned By    Initials Name Provider Type    Savita Leonard PTA Physical Therapist Assistant               Goals/Plan    No documentation.                Clinical Impression     Row Name 11/11/22 1132          Pain    Pretreatment Pain Rating 0/10 - no pain  -      Posttreatment Pain Rating 0/10 - no pain  -     Row Name 11/11/22 1132          Positioning and Restraints    Pre-Treatment Position in bed  -SM     Post Treatment Position chair  -SM     In Chair reclined;call light within reach;encouraged to call for assist;exit alarm on  -           User Key  (r) = Recorded By, (t) = Taken By, (c) = Cosigned By    Initials Name Provider Type     Savita Ray PTA Physical Therapist Assistant               Outcome Measures     Row Name 11/11/22 1132          How much help from another person do you currently need...    Turning from your back to your side while in flat bed without using bedrails? 3  -SM     Moving from lying on back to sitting on the side of a flat bed without bedrails? 3  -SM     Moving to and from a bed to a chair (including a wheelchair)? 3  -SM     Standing up from a chair using your arms (e.g., wheelchair, bedside chair)? 3  -SM     Climbing 3-5 steps with a railing? 2  -SM     To walk in hospital room? 3  -SM     AM-PAC 6 Clicks Score (PT) 17  -SM     Highest level of mobility 5 --> Static standing  -     Row Name 11/11/22 1132          Functional Assessment    Outcome Measure Options AM-PAC 6 Clicks Basic Mobility (PT)  -           User Key  (r) = Recorded By, (t) = Taken By, (c) = Cosigned By    Initials Name Provider Type     Savita Ray PTA Physical Therapist Assistant                             Physical Therapy Education     Title: PT OT SLP Therapies (Done)     Topic: Physical Therapy (Done)     Point: Mobility training (Done)     Learning Progress Summary           Patient Acceptance, E,TB,D, VU,NR by  at 11/11/2022 1132    Acceptance, E,TB, VU by MIKAEL at 11/11/2022 0559    Acceptance, E,TB, VU by MIKAEL at 11/8/2022 0605    Acceptance, E,TB,D, VU,NR by  at 11/7/2022 1157    Acceptance, E,TB,D, VU,DU,NR by  at 11/5/2022 2110    Acceptance, E, VU,NR by  at 11/5/2022 1809                   Point: Home exercise program  (Done)     Learning Progress Summary           Patient Acceptance, E,TB,D, VU,NR by  at 11/11/2022 1132    Acceptance, E,TB, VU by  at 11/11/2022 0559    Acceptance, E,TB, VU by  at 11/8/2022 0605    Acceptance, E,TB,D, VU,NR by  at 11/7/2022 1157    Acceptance, E,TB,D, VU,DU,NR by  at 11/5/2022 2110    Acceptance, E, VU,NR by  at 11/5/2022 1809                   Point: Body mechanics (Done)     Learning Progress Summary           Patient Acceptance, E,TB,D, VU,NR by  at 11/11/2022 1132    Acceptance, E,TB, VU by  at 11/11/2022 0559    Acceptance, E,TB, VU by  at 11/8/2022 0605    Acceptance, E,TB,D, VU,NR by  at 11/7/2022 1157    Acceptance, E,TB,D, VU,DU,NR by  at 11/5/2022 2110    Acceptance, E, VU,NR by  at 11/5/2022 1809                   Point: Precautions (Done)     Learning Progress Summary           Patient Acceptance, E,TB,D, VU,NR by  at 11/11/2022 1132    Acceptance, E,TB, VU by  at 11/11/2022 0559    Acceptance, E,TB, VU by  at 11/8/2022 0605    Acceptance, E,TB,D, VU,NR by  at 11/7/2022 1157    Acceptance, E,TB,D, VU,DU,NR by  at 11/5/2022 2110    Acceptance, E, VU,NR by  at 11/5/2022 1809                               User Key     Initials Effective Dates Name Provider Type Discipline     06/16/21 -  Isaura Naidu, PT Physical Therapist PT     06/16/21 -  Marion Owusu, RN Registered Nurse Nurse     03/07/18 -  Savita Ray PTA Physical Therapist Assistant PT     10/01/20 -  Debora Willoughby, RN Registered Nurse Nurse              PT Recommendation and Plan     Plan of Care Reviewed With: patient  Progress: improving  Outcome Evaluation: Pt tolerated treatment well this date. Required min A to stand, then CGA-min A to ambulate 20ft w/ Rw. Cues given to keep walker closer and to correct posture. Pt completed a few seated LE exercises, and was encouraged to ambulate w/ nsg during the day.     Time Calculation:    PT Charges     Row Name 11/11/22  1134             Time Calculation    Start Time 1032  -      Stop Time 1056  -      Time Calculation (min) 24 min  -      PT Received On 11/11/22  -      PT - Next Appointment 11/12/22  -            User Key  (r) = Recorded By, (t) = Taken By, (c) = Cosigned By    Initials Name Provider Type    Savita Leonard PTA Physical Therapist Assistant              Therapy Charges for Today     Code Description Service Date Service Provider Modifiers Qty    74506810220 HC PT THER PROC EA 15 MIN 11/11/2022 Savita Ray PTA GP 1    91432302469 HC GAIT TRAINING EA 15 MIN 11/11/2022 Savita Ray PTA GP 1          PT G-Codes  Outcome Measure Options: AM-PAC 6 Clicks Basic Mobility (PT)  AM-PAC 6 Clicks Score (PT): 17  AM-PAC 6 Clicks Score (OT): 18  Modified Geno Scale: 3 - Moderate disability.  Requiring some help, but able to walk without assistance.    Savita Ray PTA  11/11/2022

## 2022-11-11 NOTE — CASE MANAGEMENT/SOCIAL WORK
Case Management Discharge Note      Final Note: Patient discharging to [place] [facility] via [facility] van at 4PM. Packet placed in patient's chart. CCP updated patient at bedside who is agreeable and IMM provided. Patient declined CCP notifying her son, as she will call. CCP left [facility] for [facility]. Isabel VILLA RN CCP    Provided Post Acute Provider List?: Yes  Post Acute Provider List: Home Health, Nursing Home  Provided Post Acute Provider Quality & Resource List?: N/A    Selected Continued Care - Admitted Since 11/3/2022     Destination Coordination complete.    Service Provider Selected Services Address Phone Fax Patient Preferred    [facility] [address] , [place] [facility] [address] [phone] --          Durable Medical Equipment    No services have been selected for the patient.              Dialysis/Infusion    No services have been selected for the patient.              Home Medical Care    No services have been selected for the patient.              Therapy    No services have been selected for the patient.              Community Resources    No services have been selected for the patient.              Community & DME    No services have been selected for the patient.                Selected Continued Care - Prior Encounters Includes continued care and service providers with selected services from prior encounters from 8/5/2022 to 11/11/2022    Discharged on 9/6/2022 Admission date: 9/1/2022 - Discharge disposition: [place]-Health Care [facility]    Home Medical Care     Service Provider Selected Services Address Phone Fax Patient Preferred    [facility] [address], [address], [place] [facility] [id number] [phone] --                    Transportation Services  W/C Van: [facility]    Final Discharge Disposition Code: 03 - skilled nursing facility (SNF)

## 2022-11-11 NOTE — DISCHARGE SUMMARY
Patient Name: Halie Guidry  : 1940  MRN: [id number]    Date of Admission: 11/3/2022  Date of Discharge:  2022  Primary Care Physician: Napoleon Mead MD      Chief Complaint:   Hyperglycemia and Altered Mental Status      Discharge Diagnoses     Active Hospital Problems    Diagnosis  POA   • **Left lower lobe pneumonia [J18.9]  Yes   • Type 2 diabetes mellitus, with long-term current use of insulin (HCC) [E11.9, Z79.4]  Not Applicable   • Benign essential hypertension [I10]  Yes   • Stage 3a chronic kidney disease (HCC) [N18.31]  Yes      Resolved Hospital Problems    Diagnosis Date Resolved POA   • Diarrhea [R19.7] 2022 Yes   • Acute metabolic encephalopathy [G93.41] 2022 Yes   • VIPUL (acute kidney injury) (HCC) [N17.9] 2022 Yes        Hospital Course     Ms. Guidry is a 82 y.o. female with a history of HTN, depression/anxiety, DM, hypothyroidism, neuropathy who presented to [facility] initially complaining of AMS, hyperglycemia, diarrhea.  Please see the admitting history and physical for further details.  She was found to have LLL pneumonia, VIPUL, metabolic encephalopathy and was admitted to the hospital for further evaluation and treatment.  CTH was negative. She was started on antibiotics. Cultures and urine antigens remained negative. Lactate and procal were wnl. She completed antibiotics for pneumonia. Diarrhea resolved without recurrence. She received IVF's for VIPUL. Diuretic and ACE-I were held. Renal function stabilized. Blood glucose trends are acceptable on current regimen. Mental status returned to baseline. She complained of LLE/shin pain since prev fall in . Xrays were obtained as well as doppler that were negative. Ortho surgery evaluated, no surgical intervention was recommended. PT has followed who recommends SNF at discharge. Medically she is stable for discharge today.  Day of Discharge     Subjective:  No new issues. Sleeping well  w/melatonin. Pain controlled with po meds. Agrees with discharge    Physical Exam:  Temp:  [97 °F (36.1 °C)-97.5 °F (36.4 °C)] 97.5 °F (36.4 °C)  Heart Rate:  [55-56] 56  Resp:  [16-18] 16  BP: (125-132)/(63-72) 132/72  Body mass index is 23.22 kg/m².  Physical Exam  Vitals and nursing note reviewed.   Constitutional:       General: She is not in acute distress.  HENT:      Head: Normocephalic.      Mouth/Throat:      Mouth: Mucous membranes are moist.   Eyes:      Conjunctiva/sclera: Conjunctivae normal.   Cardiovascular:      Rate and Rhythm: Normal rate and regular rhythm.   Pulmonary:      Effort: Pulmonary effort is normal. No respiratory distress.      Breath sounds: No wheezing or rales.   Abdominal:      General: Bowel sounds are normal.      Palpations: Abdomen is soft.   Musculoskeletal:      Cervical back: Neck supple.      Right lower leg: Edema present.      Left lower leg: Edema present.      Comments: Trace - 1+   Skin:     General: Skin is warm and dry.      Findings: Erythema present.      Comments: Mild erythema kinsey lower legs; no wounds/weeping   Neurological:      Mental Status: She is alert and oriented to person, place, and time.   Psychiatric:         Mood and Affect: Mood normal.         Behavior: Behavior normal.         Consultants     Consult Orders (all) (From admission, onward)     Start     Ordered    11/06/22 0925  Inpatient Orthopedic Surgery Consult  Once        Specialty:  Orthopedic Surgery  Provider:  Hope Mckeon MD    11/06/22 0925 11/04/22 0646  Inpatient Nutrition Consult  Once        Provider:  (Not yet assigned)    11/04/22 0645    11/04/22 0635  Inpatient Case Management  Consult  Once        Provider:  (Not yet assigned)    11/04/22 0635    11/04/22 0635  Inpatient Diabetes Educator Consult  Once,   Status:  Canceled        Provider:  (Not yet assigned)    11/04/22 0635 11/04/22 0154  [facility] (on-call MD unless specified) Details  Once         Specialty:  Hospitalist  Provider:  Yanet Bartlett APRN    11/04/22 0153              Procedures     * Surgery not found *      Imaging Results (All)     Procedure Component Value Units Date/Time    XR Knee 1 or 2 View Left [[id number]] Collected: 11/06/22 1522     Updated: 11/07/22 0613    Narrative:      LEFT KNEE     HISTORY: Knee pain, fall.     AP and lateral views left knee demonstrate moderate loss of joint space  involving the medial and lateral compartments. Moderate patellofemoral  degenerative disease is noted. There is no evidence of fracture.  Extensive vascular calcification is noted.     This report was finalized on 11/7/2022 6:10 AM by Dr. Nolan Melgar M.D.       XR Spine Lumbar Complete With Flex & Ext [[id number]] Collected: 11/06/22 1519     Updated: 11/07/22 0612    Narrative:      LUMBAR SPINE COMPLETE WITH FLEXION-EXTENSION     HISTORY: Left shin pain, possible radiculopathy.     COMPARISON: MRI lumbar spine 06/23/2022.     FINDINGS: The patient is status post kyphoplasty at L3. There is a  severe compression deformity involving L3 as well as anterior bridging  osteophyte at L2-L3 similar to the MRI examination of 06/23/2022.  Moderate-to-severe loss of disc height and vacuum disc effect is  appreciated at L4-L5 as well as moderate endplate degenerative changes.  No obvious acute fracture is identified. There was no evidence of  subluxation in flexion or extension. Severe osteoarthritis involving the  right hip is noted with complete loss of joint space, sclerosis and  subchondral cyst formation. The etiology for the patient's left  radiculopathy is not established. Further evaluation could be performed  with a MRI examination of the lumbar spine.     This report was finalized on 11/7/2022 6:09 AM by Dr. Nolan Melgar M.D.       XR Knee 3 View Left [[id number]] Collected: 11/05/22 1755     Updated: 11/05/22 1949    Narrative:      LEFT ANKLE, LEFT TIB-FIB, LEFT KNEE     HISTORY:  Fall, pain in above areas.     LEFT KNEE: AP, lateral and sunrise views show no evidence of fracture or  of a suprapatellar fluid collection. There is moderate to severe loss of  joint space involving the medial and lateral compartments respectively.  Moderate marginal osteophytes are noted laterally. Severe vascular  calcification is noted.     LEFT TIB-FIB: AP and lateral views of the left tibia and fibula show no  evidence of fracture.     LEFT ANKLE: AP, lateral and oblique views of the left ankle show no  evidence of fracture or dislocation.     This report was finalized on 11/5/2022 7:45 PM by Dr. Nolan Melgar M.D.       XR Tibia Fibula 2 View Left [[id number]] Collected: 11/05/22 1755     Updated: 11/05/22 1949    Narrative:      LEFT ANKLE, LEFT TIB-FIB, LEFT KNEE     HISTORY: Fall, pain in above areas.     LEFT KNEE: AP, lateral and sunrise views show no evidence of fracture or  of a suprapatellar fluid collection. There is moderate to severe loss of  joint space involving the medial and lateral compartments respectively.  Moderate marginal osteophytes are noted laterally. Severe vascular  calcification is noted.     LEFT TIB-FIB: AP and lateral views of the left tibia and fibula show no  evidence of fracture.     LEFT ANKLE: AP, lateral and oblique views of the left ankle show no  evidence of fracture or dislocation.     This report was finalized on 11/5/2022 7:45 PM by Dr. Nolan Melgar M.D.       XR Ankle 3+ View Left [[id number]] Collected: 11/05/22 1755     Updated: 11/05/22 1949    Narrative:      LEFT ANKLE, LEFT TIB-FIB, LEFT KNEE     HISTORY: Fall, pain in above areas.     LEFT KNEE: AP, lateral and sunrise views show no evidence of fracture or  of a suprapatellar fluid collection. There is moderate to severe loss of  joint space involving the medial and lateral compartments respectively.  Moderate marginal osteophytes are noted laterally. Severe vascular  calcification is noted.     LEFT  TIB-FIB: AP and lateral views of the left tibia and fibula show no  evidence of fracture.     LEFT ANKLE: AP, lateral and oblique views of the left ankle show no  evidence of fracture or dislocation.     This report was finalized on 11/5/2022 7:45 PM by Dr. Nolan Melgar M.D.       CT Head Without Contrast [[id number]] Collected: 11/04/22 0137     Updated: 11/04/22 0142    Narrative:      CT HEAD WITHOUT CONTRAST     HISTORY: Altered mental status     COMPARISON: 06/22/2022     TECHNIQUE: Axial CT imaging was obtained through the brain. No IV  contrast was administered.     FINDINGS:  No acute intracranial hemorrhage is seen. There is diffuse atrophy.  There is periventricular and deep white matter microangiopathic change.  There is no midline shift or mass effect. 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 Paranasal sinuses and mastoid air  cells appear clear.       Impression:      No acute intracranial findings.     Radiation dose reduction techniques were utilized, including automated  exposure control and exposure modulation based on body size.     This report was finalized on 11/4/2022 1:39 AM by Dr. Kristina Forman M.D.       XR Chest 1 View [[id number]] Collected: 11/04/22 0009     Updated: 11/04/22 0013    Narrative:      SINGLE VIEW OF THE CHEST     HISTORY: Altered mental status     COMPARISON: 09/01/2022     FINDINGS:  Heart size is within normal limits. There is calcification of the aorta.  No pneumothorax or pleural effusion is seen. Patient does appear to have  some left basilar consolidation. Correlation with any evidence of  pneumonia is suggested. There are advanced degenerative changes of the  left shoulder.       Impression:      Left basilar consolidation.     This report was finalized on 11/4/2022 12:10 AM by Dr. Kristina Forman M.D.           Results for orders placed during the hospital encounter of 11/03/22    Duplex Venous Lower Extremity - Left  CAR    Interpretation Summary  •  Normal left lower extremity venous duplex scan.    Results for orders placed during the hospital encounter of 06/22/22    Adult transthoracic echo complete    Interpretation Summary  · Left ventricular wall thickness is consistent with mild concentric hypertrophy.  · Calculated left ventricular EF = 68% Estimated left ventricular EF was in agreement with the calculated left ventricular EF. Left ventricular systolic function is normal.  · Normal right ventricular cavity size and systolic function noted.  · The left atrial cavity is mildly dilated  · Saline test results are negative.  · Mild mitral valve regurgitation is present.  · There is no evidence of pericardial effusion    Pertinent Labs     Results from last 7 days   Lab Units 11/07/22  0458 11/05/22  0643   WBC 10*3/mm3 10.49 8.24   HEMOGLOBIN g/dL 12.0 11.4*   PLATELETS 10*3/mm3 289 278     Results from last 7 days   Lab Units 11/11/22  0512 11/10/22  0550 11/07/22 0458 11/05/22  0643   SODIUM mmol/L 134* 133* 139 141   POTASSIUM mmol/L 3.9 3.8 3.7 4.3   CHLORIDE mmol/L 102 98 102 108*   CO2 mmol/L 23.0 22.0 25.3 22.3   BUN mg/dL 38* 46* 23 40*   CREATININE mg/dL 1.13* 1.29* 1.08* 0.88   GLUCOSE mg/dL 114* 191* 196* 220*   EGFR mL/min/1.73 48.7* 41.5* 51.4* 65.7     Results from last 7 days   Lab Units 11/10/22  0550   ALBUMIN g/dL 3.50     Results from last 7 days   Lab Units 11/11/22  0512 11/10/22  0550 11/07/22 0458 11/05/22  0643   CALCIUM mg/dL 8.3* 8.5* 9.1 9.6   ALBUMIN g/dL  --  3.50  --   --    MAGNESIUM mg/dL  --  1.8  --   --    PHOSPHORUS mg/dL  --  5.5*  --   --          Results from last 7 days   Lab Units 11/10/22  0550   URIC ACID mg/dL 7.9*         Invalid input(s): LDLCALC  Results from last 7 days   Lab Units 11/04/22  1146   MRSAPCR  No MRSA Detected         Test Results Pending at Discharge       Discharge Details        Discharge Medications      New Medications      Instructions Start Date   insulin  glargine 100 UNIT/ML injection  Commonly known as: LANTUS, SEMGLEE  Replaces: Toujeo SoloStar 300 UNIT/ML solution pen-injector injection   40 Units, Subcutaneous, Nightly      insulin lispro 100 UNIT/ML injection  Commonly known as: ADMELOG  Replaces: Insulin Lispro (1 Unit Dial) 100 UNIT/ML solution pen-injector   8 Units, Subcutaneous, 3 Times Daily With Meals      insulin lispro 100 UNIT/ML injection  Commonly known as: ADMELOG   0-7 Units, Subcutaneous, 4 Times Daily With Meals & Nightly      lidocaine 5 %  Commonly known as: LIDODERM   1 patch, Transdermal, Every 24 Hours Scheduled, Remove & Discard patch within 12 hours or as directed by MD      melatonin 3 MG tablet   3 mg, Oral, Nightly         Continue These Medications      Instructions Start Date   acetaminophen 325 MG tablet  Commonly known as: TYLENOL   650 mg, Oral, Every 6 Hours PRN      amLODIPine 10 MG tablet  Commonly known as: NORVASC   10 mg, Oral, Every 24 Hours Scheduled      aspirin 81 MG EC tablet   81 mg, Oral, Daily      atorvastatin 80 MG tablet  Commonly known as: LIPITOR   TAKE 1 TABLET EVERY NIGHT      BD Pen Needle Naila 2nd Gen 32G X 4 MM misc  Generic drug: Insulin Pen Needle   USE TO INJECT INSULIN FOUR TIMES DAILY      bisoprolol 5 MG tablet  Commonly known as: ZEBeta   5 mg, Oral, Daily      chlorthalidone 25 MG tablet  Commonly known as: HYGROTON   25 mg, Oral, Daily      cyanocobalamin 1000 MCG tablet  Commonly known as: VITAMIN B-12   1,000 mcg, Oral, Daily      DULoxetine 30 MG capsule  Commonly known as: CYMBALTA   30 mg, Oral, Daily      HYDROcodone-acetaminophen 5-325 MG per tablet  Commonly known as: NORCO   1 tablet, Oral, Every 6 Hours PRN      hydrocortisone-zinc oxide-bacitracin-nystatin cream   1 application, Topical, 2 Times Daily PRN      lansoprazole 15 MG capsule  Commonly known as: PREVACID   15 mg, Oral, Daily      levothyroxine 112 MCG tablet  Commonly known as: SYNTHROID, LEVOTHROID   TAKE 1 TABLET DAILY       lisinopril 40 MG tablet  Commonly known as: PRINIVIL,ZESTRIL   40 mg, Oral, Every 24 Hours Scheduled      pregabalin 50 MG capsule  Commonly known as: LYRICA   50 mg, Oral, 3 Times Daily      Trulicity 1.5 MG/0.5ML solution pen-injector  Generic drug: Dulaglutide   1.5 mg, Subcutaneous, Weekly, sunday         Stop These Medications    famotidine 20 MG tablet  Commonly known as: PEPCID     Insulin Glargine (2 Unit Dial) 300 UNIT/ML solution pen-injector injection  Commonly known as: TOUJEO     Insulin Lispro (1 Unit Dial) 100 UNIT/ML solution pen-injector  Commonly known as: HUMALOG  Replaced by: insulin lispro 100 UNIT/ML injection     Toujeo SoloStar 300 UNIT/ML solution pen-injector injection  Generic drug: Insulin Glargine (1 Unit Dial)  Replaced by: insulin glargine 100 UNIT/ML injection            Allergies   Allergen Reactions   • Sulfa Antibiotics Unknown - High Severity     Pt says it was a long time ago and I don't remember but it wasn't good.        Discharge Disposition:  Skilled Nursing Facility (DC - External)      Discharge Diet:  Diet Order   Procedures   • Diet Regular; Cardiac, Consistent Carbohydrate, Renal, GI Soft       Discharge Activity:   Activity Instructions     Activity as Tolerated            CODE STATUS:    Code Status and Medical Interventions:   Ordered at: 11/04/22 1026     Level Of Support Discussed With:    Patient     Code Status (Patient has no pulse and is not breathing):    No CPR (Do Not Attempt to Resuscitate)     Medical Interventions (Patient has pulse or is breathing):    Full Support       Future Appointments   Date Time Provider Department Center   4/10/2023  2:00 PM Solomon Barrow MD [facility]      Contact information for follow-up providers     Napoleon Mead MD. Schedule an appointment as soon as possible for a visit in 1 week(s).    Specialty: Family Medicine  Contact information:  [address] [address] [address] [phone]              Franco Ram MD Follow up.    Specialty: Orthopedic Surgery  Why: As needed  Contact information:  [address] [facility] [address] [phone]                   Contact information for after-discharge care     Destination     [facility] .    Service: Skilled Nursing  Contact information:  [address]   [place] [address] [phone]                             Time Spent on Discharge:  Greater than 30 minutes      SERGE Andrews  [facility]  11/11/22  10:39 EST

## 2022-11-11 NOTE — PLAN OF CARE
Goal Outcome Evaluation:  Plan of Care Reviewed With: patient           Outcome Evaluation: VSS, purewick in place over night, all meds takes with no difficulity, blood sugar monitoring with insulin coverage as ordered, IVF as ordered, voiding freely, up with, asst, walker, and gait belt. will continue to monitor.

## 2022-11-11 NOTE — PLAN OF CARE
Goal Outcome Evaluation:  Plan of Care Reviewed With: patient        Progress: improving  Outcome Evaluation: Pt tolerated treatment well this date. Required min A to stand, then CGA-min A to ambulate 20ft w/ Rw. Cues given to keep walker closer and to correct posture. Pt completed a few seated LE exercises, and was encouraged to ambulate w/ nsg during the day.

## 2022-11-23 ENCOUNTER — READMISSION MANAGEMENT (OUTPATIENT)
Dept: CALL CENTER | Facility: HOSPITAL | Age: 82
End: 2022-11-23

## 2022-11-23 DIAGNOSIS — M79.2 NEUROPATHIC PAIN: ICD-10-CM

## 2022-11-23 DIAGNOSIS — F41.9 ANXIETY: ICD-10-CM

## 2022-11-23 RX ORDER — DULOXETIN HYDROCHLORIDE 30 MG/1
CAPSULE, DELAYED RELEASE ORAL
Qty: 30 CAPSULE | Refills: 0 | Status: SHIPPED | OUTPATIENT
Start: 2022-11-23 | End: 2023-03-01 | Stop reason: SDUPTHER

## 2022-11-23 NOTE — OUTREACH NOTE
Prep Survey    Flowsheet Row Responses   [facility] facility patient discharged from? Non-[facility]   Is LACE score < 7 ? Non-BH Discharge   Emergency Room discharge w/ pulse ox? No   Eligibility [facility]   Date of Discharge 11/23/22   Discharge diagnosis metabolic encephalopathy   Does the patient have one of the following disease processes/diagnoses(primary or secondary)? Other   Prep survey completed? Yes          MARK FOUNTAIN - Registered Nurse

## 2022-11-25 ENCOUNTER — TRANSITIONAL CARE MANAGEMENT TELEPHONE ENCOUNTER (OUTPATIENT)
Dept: CALL CENTER | Facility: HOSPITAL | Age: 82
End: 2022-11-25

## 2022-11-25 NOTE — OUTREACH NOTE
Call Center TCM Note    Flowsheet Row Responses   [facility] patient discharged from? Non-  [[facility]]   Does the patient have one of the following disease processes/diagnoses(primary or secondary)? Other   TCM attempt successful? No  [no verbal release]   Unsuccessful attempts Attempt 1          Latia Guzmán RN    11/25/2022, 16:33 EST

## 2022-11-25 NOTE — OUTREACH NOTE
Call Center TCM Note    Flowsheet Row Responses   [facility] patient discharged from? Non-   Does the patient have one of the following disease processes/diagnoses(primary or secondary)? Other   TCM attempt successful? Yes   Call start time 1636   Call end time 1642   Discharge diagnosis metabolic encephalopathy   Person spoke with today (if not patient) and relationship Patient   Meds reviewed with patient/caregiver? Yes   Is the patient having any side effects they believe may be caused by any medication additions or changes? No   Does the patient have all medications ordered at discharge? Yes   Is the patient taking all medications as directed (includes completed medication regime)? Yes   Does the patient have an appointment with their PCP within 7 days of discharge? Yes  [12/7/22 at 1:30 PM]   Nursing Interventions Routed TCM call to PCP office   Psychosocial issues? No   Did the patient receive a copy of their discharge instructions? Yes   Nursing interventions Reviewed instructions with patient   What is the patient's perception of their health status since discharge? Improving   Is the patient/caregiver able to teach back signs and symptoms related to disease process for when to call PCP? Yes   Is the patient/caregiver able to teach back signs and symptoms related to disease process for when to call 911? Yes   Is the patient/caregiver able to teach back the hierarchy of who to call/visit for symptoms/problems? PCP, Specialist, Home health nurse, Urgent Care, ED, 911 Yes   If the patient is a current smoker, are they able to teach back resources for cessation? Not a smoker   TCM call completed? Yes   Wrap up additional comments Pt states she is doing ok, and feeling better. Pt reports her legs/ankles are swollen. Pt advised to consult with PCP about swollen legs/ankles. Pt verbalized understanding. Pt verified PCP hospital fu appt on 12/7/22.    Call end time 1642   Would this patient benefit from a  Referral to [facility] Social Work? No   Is the patient interested in additional calls from an ambulatory ?  NOTE:  applies to high risk patients requiring additional follow-up. No          Danika Cabrera RN    11/25/2022, 16:44 EST

## 2022-11-28 ENCOUNTER — TELEPHONE (OUTPATIENT)
Dept: FAMILY MEDICINE CLINIC | Facility: CLINIC | Age: 82
End: 2022-11-28

## 2022-11-28 NOTE — TELEPHONE ENCOUNTER
Caller: GRACIELA YUEN - ERASTO    Relationship: [facility]    Best call back number: [phone]    What orders are you requesting (i.e. lab or imaging): VERBAL ORDERS FOR PHYSICAL THERAPY    Additional notes: GRACIELA YUEN IS REQUESTING VERBAL ORDERS FOR TWICE A WEEK FOR THREE WEEKS, THEN ONCE A WEEK FOR FIVE WEEKS. GRACIELA YUEN ALSO STATED THAT THEY NEED TO ADD NURSING FOR HER. PATIENT'S LOWER LEGS ARE VERY SWOLLEN, HAS REDNESS AND TENDERNESS ON THE LEFT LEG, AND IS NOT ON ANY KIND OF WATER PILL RIGHT NOW. GRACIELA YUEN STATED THAT THE DOCTOR IN THE HOSPITAL TOOK HER OFF OF IT. GRACIELA YUEN STATED THAT PATIENT JUST CAME OUT OF REHAB AND IT LOOKS LIKE THEY CHANGED A BUNCH FO MEDICATIONS AND ADDED THINGS THAT SHE WAS NOT ON BEFORE. THEY HAVE ADDED ANOTHER ANTIDEPRESSANT, BABY ASPIRIN, AND POTASSIUM CHLORIDE. GRACIELA YUEN STATED THAT PATIENT WILL BRING IN THAT MED LIST WHEN SHE COMES IN NEXT WEEK TO SEE DR SILVA. PLEASE ADVISE.

## 2022-11-28 NOTE — TELEPHONE ENCOUNTER
VERBAL ORDER GIVEN TO [facility] . OK FOR PT. PT TOLD TO KEEP SCHEDULED APPOINTMENT 12/07/2022

## 2022-12-02 ENCOUNTER — OFFICE VISIT (OUTPATIENT)
Dept: FAMILY MEDICINE CLINIC | Facility: CLINIC | Age: 82
End: 2022-12-02

## 2022-12-02 VITALS
WEIGHT: 160 LBS | DIASTOLIC BLOOD PRESSURE: 56 MMHG | SYSTOLIC BLOOD PRESSURE: 122 MMHG | BODY MASS INDEX: 22.9 KG/M2 | HEART RATE: 65 BPM | RESPIRATION RATE: 16 BRPM | OXYGEN SATURATION: 98 % | HEIGHT: 70 IN | TEMPERATURE: 98.3 F

## 2022-12-02 DIAGNOSIS — R60.9 DEPENDENT EDEMA: Primary | ICD-10-CM

## 2022-12-02 DIAGNOSIS — N18.31 STAGE 3A CHRONIC KIDNEY DISEASE: ICD-10-CM

## 2022-12-02 PROCEDURE — 99213 OFFICE O/P EST LOW 20 MIN: CPT

## 2022-12-02 RX ORDER — AMLODIPINE BESYLATE 5 MG/1
5 TABLET ORAL DAILY
COMMUNITY
End: 2023-03-01 | Stop reason: SDUPTHER

## 2022-12-02 NOTE — PROGRESS NOTES
Chief Complaint  Pain and swelling in both legs and feet    Subjective          History of Present Illness    Halie Guidry 82 y.o. female who presents today reporting pain and swelling in both legs and feet.  The patient has chronic kidney disease and is managed by [facility].  She denies chest pain, palpitations, shortness of breath, dizziness, weight gain, near-syncope, and syncope.    The patient had a home care visit on 11/30/2022 with Adrianne France.  Progress note from visit:   1. Dependent edema   Long time spend discussing pathophysiology of edema. Based on last renal note, was supposed to decrease amlodipine to 5mg, (as it can contribute to edema) but reports she was unaware. For now encouraged elevation of BLE above level of hear to get estravascular fluid back into circulation via L thoracic duct to help kidneys excrete it. Also will benefit addition of compression stockings during the day when feet are down after elevation overnight. Given contact infor for [place] where order is sent.    • compression stocking knee high 20-30 mmHg    2. Essential hypertension   BP is well controlled today. Given RPM BP machine today. Instructed on use. And will monitor remotely to help w/ management. Also will help to know what effect decreasing amlodipine to recommended dose will help on this.      6. Chronic kidney disease   Has nephrology on board. Note reviewed. holding nephrotoxic meds. decreased amlodipine to 5mg, on ace inhibitor, added SGLT2 inhibitor. BP well control. Work on improving DM control.            Today, the patient reports she is not sure if she decreased Amlodipine to 5 mg or not.  Her son is in attendance for today's appointment, and he is not sure if she decreased her dose either.  She has not picked up the compression stockings at [employer] yet.  She admits that she has not been elevating her legs when sitting, and she has not decreased sodium in her  "diet.  No change in weight from appointment last month.    She has a home care follow-up appointment with Adrianne France and her PCP Dr. Napoleon Mead next week on 12/7/2022.    She  denies medication side effects.    Review of Systems   Constitutional: Negative for chills, fatigue and fever.   HENT: Negative for congestion and sinus pressure.    Eyes: Negative for visual disturbance.   Respiratory: Negative for cough, chest tightness, shortness of breath and wheezing.    Cardiovascular: Positive for leg swelling. Negative for chest pain and palpitations.   Gastrointestinal: Negative for abdominal pain, constipation, diarrhea, nausea and vomiting.   Genitourinary: Negative for dysuria, frequency and urgency.   Musculoskeletal: Negative for back pain.   Skin: Negative for rash.   Neurological: Negative for dizziness, syncope, facial asymmetry, speech difficulty, light-headedness and numbness.   Psychiatric/Behavioral: Negative for behavioral problems and sleep disturbance.        Objective   Vital Signs:   /56   Pulse 65   Temp 98.3 °F (36.8 °C) (Oral)   Resp 16   Ht 176.8 cm (69.6\")   Wt 72.6 kg (160 lb)   SpO2 98%   BMI 23.22 kg/m²      BMI is within normal parameters. No other follow-up for BMI required.        Physical Exam  Vitals and nursing note reviewed.   Constitutional:       Appearance: Normal appearance. She is well-developed. She is not ill-appearing or toxic-appearing.   HENT:      Head: Normocephalic.      Right Ear: External ear normal.      Left Ear: External ear normal.   Eyes:      General: No scleral icterus.     Pupils: Pupils are equal, round, and reactive to light.   Neck:      Thyroid: No thyromegaly.   Cardiovascular:      Rate and Rhythm: Normal rate and regular rhythm.      Pulses: Normal pulses.           Dorsalis pedis pulses are 2+ on the right side and 2+ on the left side.        Posterior tibial pulses are 2+ on the right side and 2+ on the left side.      Heart sounds: " Normal heart sounds.      Comments: Chronic 2+ bilateral lower extremity edema.  DP and PT pulses are 2+, intact and regular.    Pulmonary:      Effort: Pulmonary effort is normal. No respiratory distress.      Breath sounds: Normal breath sounds. No stridor.   Musculoskeletal:         General: No deformity.      Right lower le+ Edema present.      Left lower le+ Edema present.   Skin:     General: Skin is warm.      Coloration: Skin is not jaundiced.   Neurological:      General: No focal deficit present.      Mental Status: She is alert and oriented to person, place, and time.      Cranial Nerves: No dysarthria.      Sensory: Sensation is intact. No sensory deficit.      Motor: No weakness or abnormal muscle tone.      Coordination: Coordination is intact.      Gait: Gait is intact. Gait normal.      Comments: Gait is steady with use of walker.  No lower extremity weakness or decreased sensation.   Psychiatric:         Behavior: Behavior normal.         Thought Content: Thought content normal.         Judgment: Judgment normal.          The following data was reviewed by: SERGE Stokes on 2022:  Basic Metabolic Panel (2022 05:12)               Assessment and Plan      Diagnoses and all orders for this visit:    1. Dependent edema (Primary)  Comments:  Verify Amlodipine 5mg dose at home, not 10mg  Wear compression stockings, elevate legs, decrease sodium  Follow up with Dr. Mead and Dr. France on Wednesday    2. Stage 3a chronic kidney disease (HCC)            Follow Up     Return in about 5 days (around 2022) for Next scheduled follow up with PCP.    Patient was given instructions and counseling regarding her condition or for health maintenance advice. Please see specific information pulled into the AVS if appropriate.     -I had a thorough discussion with the patient and her son today to reinforce Dr. France's recommendations from her home care visit 2 days ago.  The patient and  her son were encouraged to go home and verify that Amlodipine has been decreased to 5 mg daily.  The patient was also encouraged to stop by [facility] [employer]'s medical supply on the way home today to  compression stockings and start wearing them every day, to keep her legs elevated while sitting, and decrease sodium in her diet.  She was encouraged to keep follow-up appointments with Dr. France and Dr. Mead next week to assess for improvement of dependent edema after the patient has verified Amlodipine 5 mg dose and completed all of Dr. France's recommendations.  She was also encouraged to follow-up with Nephrology.  -Follow up for home care visit with Adrianne France and PCP Dr. Napoleon Mead next Wednesday 12/7/2022.  -Follow up with Nephrology.

## 2022-12-06 NOTE — PROGRESS NOTES
Subjective   Halie Guidry is a 82 y.o. female.     CC: Hospital F/U for Altered Mental State    History of Present Illness     Pt returns today after recent hospitalization. That visit was as follows:    Date of Admission: 11/3/2022  Date of Discharge:  11/11/2022  Primary Care Physician: Napoleon Mead MD        Chief Complaint:   Hyperglycemia and Altered Mental Status        Discharge Diagnoses            Active Hospital Problems     Diagnosis   POA   • **Left lower lobe pneumonia [J18.9]   Yes   • Type 2 diabetes mellitus, with long-term current use of insulin (HCC) [E11.9, Z79.4]   Not Applicable   • Benign essential hypertension [I10]   Yes   • Stage 3a chronic kidney disease (HCC) [N18.31]   Yes       Resolved Hospital Problems     Diagnosis Date Resolved POA   • Diarrhea [R19.7] 11/11/2022 Yes   • Acute metabolic encephalopathy [G93.41] 11/11/2022 Yes   • VIPUL (acute kidney injury) (HCC) [N17.9] 11/11/2022 Yes         Hospital Course      Ms. Guidry is a 82 y.o. female with a history of HTN, depression/anxiety, DM, hypothyroidism, neuropathy who presented to [facility] initially complaining of AMS, hyperglycemia, diarrhea.  Please see the admitting history and physical for further details.  She was found to have LLL pneumonia, VIPUL, metabolic encephalopathy and was admitted to the hospital for further evaluation and treatment.  CTH was negative. She was started on antibiotics. Cultures and urine antigens remained negative. Lactate and procal were wnl. She completed antibiotics for pneumonia. Diarrhea resolved without recurrence. She received IVF's for VIPUL. Diuretic and ACE-I were held. Renal function stabilized. Blood glucose trends are acceptable on current regimen. Mental status returned to baseline. She complained of LLE/shin pain since prev fall in June. Xrays were obtained as well as doppler that were negative. Ortho surgery evaluated, no surgical intervention was recommended. PT  has followed who recommends SNF at discharge. Medically she is stable for discharge today.    Current outpatient and discharge medications have been reconciled for the patient.  Reviewed by: Napoleon Mead MD    Pt saw Meri Forde on 12/2 in f/u as pt was having dependent edema issues. Meri's A/P was as follows:    1. Dependent edema (Primary)  Comments:  Verify Amlodipine 5mg dose at home, not 10mg  Wear compression stockings, elevate legs, decrease sodium  Follow up with Dr. Mead and Dr. France on Wednesday     2. Stage 3a chronic kidney disease (HCC)     had a thorough discussion with the patient and her son today to reinforce Dr. France's recommendations from her home care visit 2 days ago.  The patient and her son were encouraged to go home and verify that Amlodipine has been decreased to 5 mg daily.  The patient was also encouraged to stop by [facility] medical supply on the way home today to  compression stockings and start wearing them every day, to keep her legs elevated while sitting, and decrease sodium in her diet.  She was encouraged to keep follow-up appointments with Dr. France and Dr. Mead next week to assess for improvement of dependent edema after the patient has verified Amlodipine 5 mg dose and completed all of Dr. France's recommendations.  She was also encouraged to follow-up with Nephrology.  -Follow up for home care visit with Adrianne France and PCP Dr. Napoleon Mead next Wednesday 12/7/2022.  -Follow up with Nephrology.              The following portions of the patient's history were reviewed and updated as appropriate: allergies, current medications, past family history, past medical history, past social history, past surgical history and problem list.    Review of Systems   Constitutional: Negative for activity change, chills and fever.   Respiratory: Negative for cough.    Cardiovascular: Negative for chest pain.   Psychiatric/Behavioral: Negative for dysphoric mood.        Objective   Physical Exam  Constitutional:       General: She is not in acute distress.     Appearance: She is well-developed.   Cardiovascular:      Rate and Rhythm: Normal rate and regular rhythm.      Pulses:           Dorsalis pedis pulses are 2+ on the right side and 2+ on the left side.        Posterior tibial pulses are 2+ on the right side and 2+ on the left side.   Pulmonary:      Effort: Pulmonary effort is normal.      Breath sounds: Normal breath sounds.   Feet:      Right foot:      Protective Sensation: 10 sites tested. 10 sites sensed.      Skin integrity: No skin breakdown.      Left foot:      Protective Sensation: 10 sites tested. 10 sites sensed.      Skin integrity: No skin breakdown.   Neurological:      Mental Status: She is alert and oriented to person, place, and time.   Psychiatric:         Behavior: Behavior normal.         Thought Content: Thought content normal.     Hospital records reviewed with pt confirming HPI.      Assessment & Plan   Diagnoses and all orders for this visit:    1. Pneumonia of left lower lobe due to infectious organism (Primary)    2. Metabolic encephalopathy    3. Stage 3a chronic kidney disease (HCC)    4. Dependent edema    5. Benign essential hypertension    6. Hospital discharge follow-up    Pt recovering and making progress. Pt to continue current meds and continue with her [employer] (working well).

## 2022-12-07 ENCOUNTER — OFFICE VISIT (OUTPATIENT)
Dept: FAMILY MEDICINE CLINIC | Facility: CLINIC | Age: 82
End: 2022-12-07

## 2022-12-07 ENCOUNTER — TELEPHONE (OUTPATIENT)
Dept: FAMILY MEDICINE CLINIC | Facility: CLINIC | Age: 82
End: 2022-12-07

## 2022-12-07 VITALS
RESPIRATION RATE: 14 BRPM | HEIGHT: 70 IN | HEART RATE: 66 BPM | WEIGHT: 160 LBS | BODY MASS INDEX: 22.9 KG/M2 | DIASTOLIC BLOOD PRESSURE: 64 MMHG | TEMPERATURE: 97.3 F | SYSTOLIC BLOOD PRESSURE: 142 MMHG

## 2022-12-07 DIAGNOSIS — R60.9 DEPENDENT EDEMA: ICD-10-CM

## 2022-12-07 DIAGNOSIS — Z09 HOSPITAL DISCHARGE FOLLOW-UP: ICD-10-CM

## 2022-12-07 DIAGNOSIS — N18.31 STAGE 3A CHRONIC KIDNEY DISEASE: ICD-10-CM

## 2022-12-07 DIAGNOSIS — J18.9 PNEUMONIA OF LEFT LOWER LOBE DUE TO INFECTIOUS ORGANISM: Primary | ICD-10-CM

## 2022-12-07 DIAGNOSIS — G93.41 METABOLIC ENCEPHALOPATHY: ICD-10-CM

## 2022-12-07 DIAGNOSIS — I10 BENIGN ESSENTIAL HYPERTENSION: ICD-10-CM

## 2022-12-07 PROCEDURE — 99212 OFFICE O/P EST SF 10 MIN: CPT | Performed by: FAMILY MEDICINE

## 2022-12-07 NOTE — TELEPHONE ENCOUNTER
Caller: MARCOS    Relationship: [employer]    Best call back number: [phone]    What is the best time to reach you: ANY    Who are you requesting to speak with (clinical staff, provider,  specific staff member): DR. SILVA OR MA    What was the call regarding: MARCOS WITH [employer] [facility] STATES PATIENT'S EVALUATION WAS COMPLETE ON 12/1/22, AND THERE WILL BE NO ADDITIONAL OCCUPATIONAL THERAPY.     PLEASE CALL IF ANY QUESTIONS OR CONCERNS.

## 2022-12-19 ENCOUNTER — TELEPHONE (OUTPATIENT)
Dept: FAMILY MEDICINE CLINIC | Facility: CLINIC | Age: 82
End: 2022-12-19

## 2022-12-19 NOTE — TELEPHONE ENCOUNTER
Caller: YEYO    Relationship to patient: [employer]    Best call back number: [phone]    Patient is needing: SHE IS GOING TO BE REFAXING A FORM FOR THIS PATIENT.

## 2023-01-16 ENCOUNTER — TELEPHONE (OUTPATIENT)
Dept: FAMILY MEDICINE CLINIC | Facility: CLINIC | Age: 83
End: 2023-01-16

## 2023-02-15 DIAGNOSIS — G93.41 METABOLIC ENCEPHALOPATHY: ICD-10-CM

## 2023-02-15 RX ORDER — PREGABALIN 50 MG/1
50 CAPSULE ORAL 3 TIMES DAILY
Qty: 9 CAPSULE | Refills: 0 | OUTPATIENT
Start: 2023-02-15 | End: 2023-02-18

## 2023-02-15 NOTE — TELEPHONE ENCOUNTER
Caller: Halie Guidry JANIE    Relationship: Self    Best call back number:[phone]      Requested Prescriptions:   Requested Prescriptions     Pending Prescriptions Disp Refills   • pregabalin (LYRICA) 50 MG capsule 9 capsule 0     Sig: Take 1 capsule by mouth 3 (Three) Times a Day for 3 days.        Pharmacy where request should be sent: [facility] - [address] [phone] [facility] [phone] [facility]     Additional details provided by patient: PATIENT STATES SHE IS RUNNING LOW.    Does the patient have less than a 3 day supply:  [x] Yes  [] No    Would you like a call back once the refill request has been completed: [x] Yes [] No    If the office needs to give you a call back, can they leave a voicemail: [x] Yes [] No    Giovanna Sawyer, RegSched Rep   02/15/23 13:04 EST     PLEASE ADVISE.

## 2023-02-22 DIAGNOSIS — I10 ACCELERATED HYPERTENSION: ICD-10-CM

## 2023-02-22 RX ORDER — LISINOPRIL 40 MG/1
TABLET ORAL
Qty: 30 TABLET | Refills: 0 | Status: SHIPPED | OUTPATIENT
Start: 2023-02-22 | End: 2023-03-01 | Stop reason: SDUPTHER

## 2023-03-01 ENCOUNTER — TELEMEDICINE (OUTPATIENT)
Dept: FAMILY MEDICINE CLINIC | Facility: CLINIC | Age: 83
End: 2023-03-01
Payer: MEDICARE

## 2023-03-01 VITALS — BODY MASS INDEX: 23.22 KG/M2 | HEIGHT: 70 IN

## 2023-03-01 DIAGNOSIS — M79.2 NEUROPATHIC PAIN: Chronic | ICD-10-CM

## 2023-03-01 DIAGNOSIS — I10 ACCELERATED HYPERTENSION: Primary | Chronic | ICD-10-CM

## 2023-03-01 DIAGNOSIS — F41.9 ANXIETY: Chronic | ICD-10-CM

## 2023-03-01 DIAGNOSIS — G62.9 NEUROPATHY: ICD-10-CM

## 2023-03-01 DIAGNOSIS — H93.13 TINNITUS OF BOTH EARS: ICD-10-CM

## 2023-03-01 PROCEDURE — 99442 PR PHYS/QHP TELEPHONE EVALUATION 11-20 MIN: CPT | Performed by: FAMILY MEDICINE

## 2023-03-01 RX ORDER — BISOPROLOL FUMARATE 5 MG/1
5 TABLET, FILM COATED ORAL DAILY
Qty: 90 TABLET | Refills: 1 | Status: SHIPPED | OUTPATIENT
Start: 2023-03-01

## 2023-03-01 RX ORDER — PREGABALIN 50 MG/1
50 CAPSULE ORAL 3 TIMES DAILY
Qty: 42 CAPSULE | Refills: 0 | Status: SHIPPED | OUTPATIENT
Start: 2023-03-01 | End: 2023-03-01 | Stop reason: SDUPTHER

## 2023-03-01 RX ORDER — DULOXETIN HYDROCHLORIDE 30 MG/1
30 CAPSULE, DELAYED RELEASE ORAL DAILY
Qty: 90 CAPSULE | Refills: 1 | Status: SHIPPED | OUTPATIENT
Start: 2023-03-01

## 2023-03-01 RX ORDER — PREGABALIN 50 MG/1
50 CAPSULE ORAL 3 TIMES DAILY
Qty: 270 CAPSULE | Refills: 1 | Status: SHIPPED | OUTPATIENT
Start: 2023-03-01

## 2023-03-01 RX ORDER — CHLORTHALIDONE 25 MG/1
25 TABLET ORAL DAILY
Qty: 90 TABLET | Refills: 1 | Status: SHIPPED | OUTPATIENT
Start: 2023-03-01

## 2023-03-01 RX ORDER — LISINOPRIL 40 MG/1
40 TABLET ORAL DAILY
Qty: 90 TABLET | Refills: 1 | Status: SHIPPED | OUTPATIENT
Start: 2023-03-01

## 2023-03-01 RX ORDER — AMLODIPINE BESYLATE 5 MG/1
5 TABLET ORAL DAILY
Qty: 90 TABLET | Refills: 1 | Status: SHIPPED | OUTPATIENT
Start: 2023-03-01

## 2023-03-01 NOTE — PROGRESS NOTES
Subjective   Halie Guidry is a 82 y.o. female.     CC: Telehealth Visit for Medical Management    History of Present Illness     Chief Complaint:   Chief Complaint   Patient presents with   • Hypertension   • Anxiety     PT TO SCHEDULE MED WELLNESS   • Arthritis     PT REQUESTING TELEPHONE ENCOUNTER TODAY  / MED REFILL       Halie Guidry 82 y.o. female who presents today for Medical Management of the below listed issues. She  has a problem list of   Patient Active Problem List   Diagnosis   • Compression fracture   • Lumbar degenerative disc disease   • Type 2 diabetes mellitus, with long-term current use of insulin ([facility])   • Hyperlipidemia   • Benign essential hypertension   • Primary hypothyroidism   • Leukocytosis   • Neuropathy   • Osteoporosis   • Proteinuria   • Tobacco abuse   • Diabetic eye exam ([facility])   • Generalized weakness   • Spinal stenosis   • Scoliosis   • Arthritis   • VBI (vertebrobasilar insufficiency)   • Orthostatic hypotension   • 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 ([facility])   • Severe hypothyroidism   • Noncompliance with medication regimen   • Vitamin D deficiency disease   • Altered mental status   • Tremor   • Seizure ([facility])   • Stage 3a chronic kidney disease ([facility])   • Thoracic degenerative disc disease   • Metabolic encephalopathy   • Hyperglycemia   • Type II diabetes mellitus, uncontrolled   • Lower abdominal pain   • Transaminitis   • COVID-19 virus detected   • UTI (urinary tract infection)   • Emphysematous cystitis   • Choledocholithiasis   • Hypokalemia   • Hypoxia   • Generalized abdominal pain   • History of Clostridium difficile infection   • History of ERCP   • Acute UTI (urinary tract infection)   • Hypomagnesemia   • Burning pain   • Anxiety disorder   • Neuropathic pain   • Intertrigo   • Weakness of both lower extremities   • Accelerated hypertension   • Acute cystitis without hematuria   • Altered mental status, unspecified altered mental  status type   • Left lower lobe pneumonia   .  Since the last visit, She has overall felt well.  she has been compliant with   Current Outpatient Medications:   •  amLODIPine (NORVASC) 5 MG tablet, Take 1 tablet by mouth Daily., Disp: 90 tablet, Rfl: 1  •  bisoprolol (ZEBeta) 5 MG tablet, Take 1 tablet by mouth Daily., Disp: 90 tablet, Rfl: 1  •  chlorthalidone (HYGROTON) 25 MG tablet, Take 1 tablet by mouth Daily., Disp: 90 tablet, Rfl: 1  •  DULoxetine (CYMBALTA) 30 MG capsule, Take 1 capsule by mouth Daily., Disp: 90 capsule, Rfl: 1  •  lisinopril (PRINIVIL,ZESTRIL) 40 MG tablet, Take 1 tablet by mouth Daily., Disp: 90 tablet, Rfl: 1  •  pregabalin (LYRICA) 50 MG capsule, Take 1 capsule by mouth 3 (Three) Times a Day., Disp: 270 capsule, Rfl: 1  •  acetaminophen (TYLENOL) 325 MG tablet, Take 2 tablets by mouth Every 6 (Six) Hours As Needed for Mild Pain ., Disp: , Rfl:   •  atorvastatin (LIPITOR) 80 MG tablet, TAKE 1 TABLET EVERY NIGHT, Disp: 90 tablet, Rfl: 3  •  BD Pen Needle Naila 2nd Gen 32G X 4 MM misc, USE TO INJECT INSULIN FOUR TIMES DAILY, Disp: 200 each, Rfl: 0  •  Dulaglutide (Trulicity) 1.5 MG/0.5ML solution pen-injector, Inject 1.5 mg under the skin into the appropriate area as directed 1 (One) Time Per Week. sunday, Disp: 6 mL, Rfl: 3  •  HYDROcodone-acetaminophen (NORCO) 5-325 MG per tablet, Take 1 tablet by mouth Every 6 (Six) Hours As Needed for Moderate Pain for up to 12 doses., Disp: 12 tablet, Rfl: 0  •  hydrocortisone-zinc oxide-bacitracin-nystatin cream, Apply 1 application topically to the appropriate area as directed 2 (Two) Times a Day As Needed (rash)., Disp: 5 g, Rfl: 0  •  insulin glargine (LANTUS, SEMGLEE) 100 UNIT/ML injection, Inject 40 Units under the skin into the appropriate area as directed Every Night., Disp: , Rfl: 12  •  insulin lispro (ADMELOG) 100 UNIT/ML injection, Inject 8 Units under the skin into the appropriate area as directed 3 (Three) Times a Day With Meals., Disp: ,  "Rfl: 12  •  insulin lispro (ADMELOG) 100 UNIT/ML injection, Inject 0-7 Units under the skin into the appropriate area as directed 4 (Four) Times a Day With Meals & at Bedtime., Disp: , Rfl: 12  •  lansoprazole (PREVACID) 15 MG capsule, Take 15 mg by mouth Daily., Disp: , Rfl:   •  levothyroxine (SYNTHROID, LEVOTHROID) 112 MCG tablet, TAKE 1 TABLET DAILY, Disp: 90 tablet, Rfl: 3  •  melatonin 3 MG tablet, Take 1 tablet by mouth Every Night., Disp: 30 tablet, Rfl:   •  vitamin B-12 (VITAMIN B-12) 1000 MCG tablet, Take 1 tablet by mouth Daily., Disp: , Rfl: .  She denies medication side effects.    All of the other chronic condition(s) listed above are stable w/o issues.    Ht 176.8 cm (69.6\")   BMI 23.22 kg/m²     Results for orders placed or performed during the hospital encounter of 11/03/22   Respiratory Panel PCR w/COVID-19(SARS-CoV-2) NAY/JEANIE/GAYLE/PAD/COR/MAD/YANN In-House, NP Swab in UTM/VTM, 3-4 HR TAT - Swab, Nasopharynx    Specimen: Nasopharynx; Swab   Result Value Ref Range    ADENOVIRUS, PCR Not Detected Not Detected    Coronavirus 229E Not Detected Not Detected    Coronavirus HKU1 Not Detected Not Detected    Coronavirus NL63 Not Detected Not Detected    Coronavirus OC43 Not Detected Not Detected    COVID19 Not Detected Not Detected - Ref. Range    Human Metapneumovirus Not Detected Not Detected    Human Rhinovirus/Enterovirus Not Detected Not Detected    Influenza A PCR Not Detected Not Detected    Influenza B PCR Not Detected Not Detected    Parainfluenza Virus 1 Not Detected Not Detected    Parainfluenza Virus 2 Not Detected Not Detected    Parainfluenza Virus 3 Not Detected Not Detected    Parainfluenza Virus 4 Not Detected Not Detected    RSV, PCR Not Detected Not Detected    Bordetella pertussis pcr Not Detected Not Detected    Bordetella parapertussis PCR Not Detected Not Detected    Chlamydophila pneumoniae PCR Not Detected Not Detected    Mycoplasma pneumo by PCR Not Detected Not Detected   Blood " Culture - Blood, Hand, Right    Specimen: Hand, Right; Blood   Result Value Ref Range    Blood Culture No growth at 5 days    Blood Culture - Blood, Hand, Left    Specimen: Hand, Left; Blood   Result Value Ref Range    Blood Culture No growth at 5 days    S. Pneumo Ag Urine or CSF - Urine, Urine, Clean Catch    Specimen: Urine, Clean Catch   Result Value Ref Range    Strep Pneumo Ag Negative Negative   MRSA Screen, PCR (Inpatient) - Swab, Nares    Specimen: Nares; Swab   Result Value Ref Range    MRSA PCR No MRSA Detected No MRSA Detected   Legionella Antigen, Urine - Urine, Urine, Clean Catch    Specimen: Urine, Clean Catch   Result Value Ref Range    LEGIONELLA ANTIGEN, URINE Negative Negative   Comprehensive Metabolic Panel    Specimen: Blood   Result Value Ref Range    Glucose 341 (H) 65 - 99 mg/dL    BUN 77 (H) 8 - 23 mg/dL    Creatinine 1.69 (H) 0.57 - 1.00 mg/dL    Sodium 135 (L) 136 - 145 mmol/L    Potassium 4.9 3.5 - 5.2 mmol/L    Chloride 103 98 - 107 mmol/L    CO2 20.7 (L) 22.0 - 29.0 mmol/L    Calcium 9.1 8.6 - 10.5 mg/dL    Total Protein 7.4 6.0 - 8.5 g/dL    Albumin 4.20 3.50 - 5.20 g/dL    ALT (SGPT) 10 1 - 33 U/L    AST (SGOT) 13 1 - 32 U/L    Alkaline Phosphatase 110 39 - 117 U/L    Total Bilirubin 0.3 0.0 - 1.2 mg/dL    Globulin 3.2 gm/dL    A/G Ratio 1.3 g/dL    BUN/Creatinine Ratio 45.6 (H) 7.0 - 25.0    Anion Gap 11.3 5.0 - 15.0 mmol/L    eGFR 30.0 (L) >60.0 mL/min/1.73   Procalcitonin    Specimen: Blood   Result Value Ref Range    Procalcitonin 0.06 0.00 - 0.25 ng/mL   Lactic Acid, Plasma    Specimen: Blood   Result Value Ref Range    Lactate 1.8 0.5 - 2.0 mmol/L   Troponin    Specimen: Blood   Result Value Ref Range    Troponin T <0.010 0.000 - 0.030 ng/mL   Ammonia    Specimen: Blood   Result Value Ref Range    Ammonia 26 11 - 51 umol/L   Magnesium    Specimen: Blood   Result Value Ref Range    Magnesium 2.4 1.6 - 2.4 mg/dL   Protime-INR    Specimen: Blood   Result Value Ref Range    Protime  13.6 11.7 - 14.2 Seconds    INR 1.03 0.90 - 1.10   aPTT    Specimen: Blood   Result Value Ref Range    PTT 34.7 22.7 - 35.4 seconds   Beta Hydroxybutyrate Quantitative    Specimen: Blood   Result Value Ref Range    Beta-Hydroxybutyrate Quant 0.238 0.020 - 0.270 mmol/L   CBC Auto Differential    Specimen: Blood   Result Value Ref Range    WBC 11.81 (H) 3.40 - 10.80 10*3/mm3    RBC 4.27 3.77 - 5.28 10*6/mm3    Hemoglobin 12.6 12.0 - 15.9 g/dL    Hematocrit 37.2 34.0 - 46.6 %    MCV 87.1 79.0 - 97.0 fL    MCH 29.5 26.6 - 33.0 pg    MCHC 33.9 31.5 - 35.7 g/dL    RDW 14.8 12.3 - 15.4 %    RDW-SD 46.6 37.0 - 54.0 fl    MPV 9.4 6.0 - 12.0 fL    Platelets 355 140 - 450 10*3/mm3    Neutrophil % 79.9 (H) 42.7 - 76.0 %    Lymphocyte % 10.6 (L) 19.6 - 45.3 %    Monocyte % 5.8 5.0 - 12.0 %    Eosinophil % 2.2 0.3 - 6.2 %    Basophil % 0.8 0.0 - 1.5 %    Immature Grans % 0.7 (H) 0.0 - 0.5 %    Neutrophils, Absolute 9.44 (H) 1.70 - 7.00 10*3/mm3    Lymphocytes, Absolute 1.25 0.70 - 3.10 10*3/mm3    Monocytes, Absolute 0.69 0.10 - 0.90 10*3/mm3    Eosinophils, Absolute 0.26 0.00 - 0.40 10*3/mm3    Basophils, Absolute 0.09 0.00 - 0.20 10*3/mm3    Immature Grans, Absolute 0.08 (H) 0.00 - 0.05 10*3/mm3    nRBC 0.0 0.0 - 0.2 /100 WBC   Blood Gas, Arterial -    Specimen: Arterial Blood   Result Value Ref Range    Site Arterial: right radial     Bebeto's Test Positive     pH, Arterial 7.333 (L) 7.350 - 7.450 pH units    pCO2, Arterial 41.0 35.0 - 45.0 mm Hg    pO2, Arterial 71.3 (L) 80.0 - 100.0 mm Hg    HCO3, Arterial 21.8 (L) 22.0 - 28.0 mmol/L    Base Excess, Arterial -3.9 (L) 0.0 - 2.0 mmol/L    O2 Saturation Calculated 93.0 92.0 - 99.0 %    A-a DO2 0.7 mmHg    Barometric Pressure for Blood Gas 753.8 mmHg    Modality Room Air     FIO2 21 %    Rate 18 Breaths/minute   Basic Metabolic Panel    Specimen: Blood   Result Value Ref Range    Glucose 234 (H) 65 - 99 mg/dL    BUN 67 (H) 8 - 23 mg/dL    Creatinine 1.32 (H) 0.57 - 1.00 mg/dL     Sodium 138 136 - 145 mmol/L    Potassium 4.4 3.5 - 5.2 mmol/L    Chloride 105 98 - 107 mmol/L    CO2 17.2 (L) 22.0 - 29.0 mmol/L    Calcium 8.9 8.6 - 10.5 mg/dL    BUN/Creatinine Ratio 50.8 (H) 7.0 - 25.0    Anion Gap 15.8 (H) 5.0 - 15.0 mmol/L    eGFR 40.4 (L) >60.0 mL/min/1.73   CBC Auto Differential    Specimen: Blood   Result Value Ref Range    WBC 10.03 3.40 - 10.80 10*3/mm3    RBC 4.21 3.77 - 5.28 10*6/mm3    Hemoglobin 12.2 12.0 - 15.9 g/dL    Hematocrit 36.3 34.0 - 46.6 %    MCV 86.2 79.0 - 97.0 fL    MCH 29.0 26.6 - 33.0 pg    MCHC 33.6 31.5 - 35.7 g/dL    RDW 14.5 12.3 - 15.4 %    RDW-SD 46.3 37.0 - 54.0 fl    MPV 9.8 6.0 - 12.0 fL    Platelets 286 140 - 450 10*3/mm3    Neutrophil % 69.1 42.7 - 76.0 %    Lymphocyte % 15.5 (L) 19.6 - 45.3 %    Monocyte % 10.4 5.0 - 12.0 %    Eosinophil % 3.8 0.3 - 6.2 %    Basophil % 0.9 0.0 - 1.5 %    Immature Grans % 0.3 0.0 - 0.5 %    Neutrophils, Absolute 6.94 1.70 - 7.00 10*3/mm3    Lymphocytes, Absolute 1.55 0.70 - 3.10 10*3/mm3    Monocytes, Absolute 1.04 (H) 0.10 - 0.90 10*3/mm3    Eosinophils, Absolute 0.38 0.00 - 0.40 10*3/mm3    Basophils, Absolute 0.09 0.00 - 0.20 10*3/mm3    Immature Grans, Absolute 0.03 0.00 - 0.05 10*3/mm3    nRBC 0.0 0.0 - 0.2 /100 WBC   Basic Metabolic Panel    Specimen: Blood   Result Value Ref Range    Glucose 220 (H) 65 - 99 mg/dL    BUN 40 (H) 8 - 23 mg/dL    Creatinine 0.88 0.57 - 1.00 mg/dL    Sodium 141 136 - 145 mmol/L    Potassium 4.3 3.5 - 5.2 mmol/L    Chloride 108 (H) 98 - 107 mmol/L    CO2 22.3 22.0 - 29.0 mmol/L    Calcium 9.6 8.6 - 10.5 mg/dL    BUN/Creatinine Ratio 45.5 (H) 7.0 - 25.0    Anion Gap 10.7 5.0 - 15.0 mmol/L    eGFR 65.7 >60.0 mL/min/1.73   CBC Auto Differential    Specimen: Blood   Result Value Ref Range    WBC 8.24 3.40 - 10.80 10*3/mm3    RBC 3.99 3.77 - 5.28 10*6/mm3    Hemoglobin 11.4 (L) 12.0 - 15.9 g/dL    Hematocrit 34.5 34.0 - 46.6 %    MCV 86.5 79.0 - 97.0 fL    MCH 28.6 26.6 - 33.0 pg    MCHC 33.0 31.5  - 35.7 g/dL    RDW 14.7 12.3 - 15.4 %    RDW-SD 46.6 37.0 - 54.0 fl    MPV 9.4 6.0 - 12.0 fL    Platelets 278 140 - 450 10*3/mm3    Neutrophil % 67.9 42.7 - 76.0 %    Lymphocyte % 18.6 (L) 19.6 - 45.3 %    Monocyte % 8.9 5.0 - 12.0 %    Eosinophil % 3.4 0.3 - 6.2 %    Basophil % 0.7 0.0 - 1.5 %    Immature Grans % 0.5 0.0 - 0.5 %    Neutrophils, Absolute 5.60 1.70 - 7.00 10*3/mm3    Lymphocytes, Absolute 1.53 0.70 - 3.10 10*3/mm3    Monocytes, Absolute 0.73 0.10 - 0.90 10*3/mm3    Eosinophils, Absolute 0.28 0.00 - 0.40 10*3/mm3    Basophils, Absolute 0.06 0.00 - 0.20 10*3/mm3    Immature Grans, Absolute 0.04 0.00 - 0.05 10*3/mm3    nRBC 0.0 0.0 - 0.2 /100 WBC   Basic Metabolic Panel    Specimen: Blood   Result Value Ref Range    Glucose 196 (H) 65 - 99 mg/dL    BUN 23 8 - 23 mg/dL    Creatinine 1.08 (H) 0.57 - 1.00 mg/dL    Sodium 139 136 - 145 mmol/L    Potassium 3.7 3.5 - 5.2 mmol/L    Chloride 102 98 - 107 mmol/L    CO2 25.3 22.0 - 29.0 mmol/L    Calcium 9.1 8.6 - 10.5 mg/dL    BUN/Creatinine Ratio 21.3 7.0 - 25.0    Anion Gap 11.7 5.0 - 15.0 mmol/L    eGFR 51.4 (L) >60.0 mL/min/1.73   CBC (No Diff)    Specimen: Blood   Result Value Ref Range    WBC 10.49 3.40 - 10.80 10*3/mm3    RBC 4.23 3.77 - 5.28 10*6/mm3    Hemoglobin 12.0 12.0 - 15.9 g/dL    Hematocrit 36.5 34.0 - 46.6 %    MCV 86.3 79.0 - 97.0 fL    MCH 28.4 26.6 - 33.0 pg    MCHC 32.9 31.5 - 35.7 g/dL    RDW 14.4 12.3 - 15.4 %    RDW-SD 45.4 37.0 - 54.0 fl    MPV 9.1 6.0 - 12.0 fL    Platelets 289 140 - 450 10*3/mm3   Renal Function Panel    Specimen: Blood   Result Value Ref Range    Glucose 191 (H) 65 - 99 mg/dL    BUN 46 (H) 8 - 23 mg/dL    Creatinine 1.29 (H) 0.57 - 1.00 mg/dL    Sodium 133 (L) 136 - 145 mmol/L    Potassium 3.8 3.5 - 5.2 mmol/L    Chloride 98 98 - 107 mmol/L    CO2 22.0 22.0 - 29.0 mmol/L    Calcium 8.5 (L) 8.6 - 10.5 mg/dL    Albumin 3.50 3.50 - 5.20 g/dL    Phosphorus 5.5 (H) 2.5 - 4.5 mg/dL    Anion Gap 13.0 5.0 - 15.0 mmol/L     BUN/Creatinine Ratio 35.7 (H) 7.0 - 25.0    eGFR 41.5 (L) >60.0 mL/min/1.73   Magnesium    Specimen: Blood   Result Value Ref Range    Magnesium 1.8 1.6 - 2.4 mg/dL   Uric Acid    Specimen: Blood   Result Value Ref Range    Uric Acid 7.9 (H) 2.4 - 5.7 mg/dL   Basic Metabolic Panel    Specimen: Blood   Result Value Ref Range    Glucose 114 (H) 65 - 99 mg/dL    BUN 38 (H) 8 - 23 mg/dL    Creatinine 1.13 (H) 0.57 - 1.00 mg/dL    Sodium 134 (L) 136 - 145 mmol/L    Potassium 3.9 3.5 - 5.2 mmol/L    Chloride 102 98 - 107 mmol/L    CO2 23.0 22.0 - 29.0 mmol/L    Calcium 8.3 (L) 8.6 - 10.5 mg/dL    BUN/Creatinine Ratio 33.6 (H) 7.0 - 25.0    Anion Gap 9.0 5.0 - 15.0 mmol/L    eGFR 48.7 (L) >60.0 mL/min/1.73   POC Glucose Once    Specimen: Blood   Result Value Ref Range    Glucose 302 (H) 70 - 130 mg/dL   POC Glucose Once    Specimen: Blood   Result Value Ref Range    Glucose 295 (H) 70 - 130 mg/dL   POC Glucose Once    Specimen: Blood   Result Value Ref Range    Glucose 204 (H) 70 - 130 mg/dL   POC Glucose Once    Specimen: Blood   Result Value Ref Range    Glucose 176 (H) 70 - 130 mg/dL   POC Glucose Once    Specimen: Blood   Result Value Ref Range    Glucose 377 (H) 70 - 130 mg/dL   POC Glucose Once    Specimen: Blood   Result Value Ref Range    Glucose 275 (H) 70 - 130 mg/dL   POC Glucose Once    Specimen: Blood   Result Value Ref Range    Glucose 198 (H) 70 - 130 mg/dL   POC Glucose Once    Specimen: Blood   Result Value Ref Range    Glucose 213 (H) 70 - 130 mg/dL   POC Glucose Once    Specimen: Blood   Result Value Ref Range    Glucose 302 (H) 70 - 130 mg/dL   POC Glucose Once    Specimen: Blood   Result Value Ref Range    Glucose 225 (H) 70 - 130 mg/dL   POC Glucose Once    Specimen: Blood   Result Value Ref Range    Glucose 324 (H) 70 - 130 mg/dL   POC Glucose Once    Specimen: Blood   Result Value Ref Range    Glucose 152 (H) 70 - 130 mg/dL   POC Glucose Once    Specimen: Blood   Result Value Ref Range    Glucose  244 (H) 70 - 130 mg/dL   POC Glucose Once    Specimen: Blood   Result Value Ref Range    Glucose 77 70 - 130 mg/dL   POC Glucose Once    Specimen: Blood   Result Value Ref Range    Glucose 282 (H) 70 - 130 mg/dL   POC Glucose Once    Specimen: Blood   Result Value Ref Range    Glucose 175 (H) 70 - 130 mg/dL   POC Glucose Once    Specimen: Blood   Result Value Ref Range    Glucose 205 (H) 70 - 130 mg/dL   POC Glucose Once    Specimen: Blood   Result Value Ref Range    Glucose 156 (H) 70 - 130 mg/dL   POC Glucose Once    Specimen: Blood   Result Value Ref Range    Glucose 155 (H) 70 - 130 mg/dL   POC Glucose Once    Specimen: Blood   Result Value Ref Range    Glucose 126 70 - 130 mg/dL   POC Glucose Once    Specimen: Blood   Result Value Ref Range    Glucose 155 (H) 70 - 130 mg/dL   POC Glucose Once    Specimen: Blood   Result Value Ref Range    Glucose 218 (H) 70 - 130 mg/dL   POC Glucose Once    Specimen: Blood   Result Value Ref Range    Glucose 107 70 - 130 mg/dL   POC Glucose Once    Specimen: Blood   Result Value Ref Range    Glucose 116 70 - 130 mg/dL   POC Glucose Once    Specimen: Blood   Result Value Ref Range    Glucose 163 (H) 70 - 130 mg/dL   POC Glucose Once    Specimen: Blood   Result Value Ref Range    Glucose 150 (H) 70 - 130 mg/dL   POC Glucose Once    Specimen: Blood   Result Value Ref Range    Glucose 127 70 - 130 mg/dL   POC Glucose Once    Specimen: Blood   Result Value Ref Range    Glucose 172 (H) 70 - 130 mg/dL   POC Glucose Once    Specimen: Blood   Result Value Ref Range    Glucose 187 (H) 70 - 130 mg/dL   POC Glucose Once    Specimen: Blood   Result Value Ref Range    Glucose 221 (H) 70 - 130 mg/dL   POC Glucose Once    Specimen: Blood   Result Value Ref Range    Glucose 211 (H) 70 - 130 mg/dL   POC Glucose Once    Specimen: Blood   Result Value Ref Range    Glucose 109 70 - 130 mg/dL   POC Glucose Once    Specimen: Blood   Result Value Ref Range    Glucose 160 (H) 70 - 130 mg/dL   ECG 12  Lead Altered Mental Status   Result Value Ref Range    QT Interval 430 ms   Green Top (Gel)   Result Value Ref Range    Extra Tube Hold for add-ons.    Lavender Top   Result Value Ref Range    Extra Tube hold for add-on    Gold Top - SST   Result Value Ref Range    Extra Tube Hold for add-ons.    Light Blue Top   Result Value Ref Range    Extra Tube Hold for add-ons.    Duplex Venous Lower Extremity - Left CAR   Result Value Ref Range    Target HR (85%) 117 bpm    Max. Pred. HR (100%) 138 bpm    Right Common Femoral Spont Y     Right Common Femoral Competent Y     Right Common Femoral Phasic Y     Right Common Femoral Compress C     Right Common Femoral Augment Y     Left Common Femoral Spont Y     Left Common Femoral Competent Y     Left Common Femoral Phasic Y     Left Common Femoral Compress C     Left Common Femoral Augment Y     Left Saphenofemoral Junction Compress C     Left Mid Femoral Spont Y     Left Mid Femoral Competent Y     Left Mid Femoral Phasic Y     Left Mid Femoral Augment Y     Left Popliteal Spont Y     Left Popliteal Competent Y     Left Popliteal Phasic Y     Left Popliteal Compress C     Left Popliteal Augment Y     Left Posterior Tibial Compress C     Left Peroneal Compress C     Left Gastronemius Compress C     Left Greater Saph AK Compress C     Left Greater Saph BK Compress C     Left Lesser Saph Compress C              The following portions of the patient's history were reviewed and updated as appropriate: allergies, current medications, past family history, past medical history, past social history, past surgical history, and problem list.    Review of Systems   Constitutional: Negative for activity change, chills and fever.   HENT: Positive for tinnitus (since last Summer).    Respiratory: Negative for cough.    Cardiovascular: Negative for chest pain.   Psychiatric/Behavioral: Negative for dysphoric mood.       Objective   Physical Exam  Constitutional:       General: She is not in  acute distress.  Pulmonary:      Effort: Pulmonary effort is normal.   Neurological:      Mental Status: She is oriented to person, place, and time.   Psychiatric:         Behavior: Behavior normal.         Thought Content: Thought content normal.             Diagnoses and all orders for this visit:    1. Accelerated hypertension (Primary)  -     chlorthalidone (HYGROTON) 25 MG tablet; Take 1 tablet by mouth Daily.  Dispense: 90 tablet; Refill: 1  -     bisoprolol (ZEBeta) 5 MG tablet; Take 1 tablet by mouth Daily.  Dispense: 90 tablet; Refill: 1  -     lisinopril (PRINIVIL,ZESTRIL) 40 MG tablet; Take 1 tablet by mouth Daily.  Dispense: 90 tablet; Refill: 1  -     amLODIPine (NORVASC) 5 MG tablet; Take 1 tablet by mouth Daily.  Dispense: 90 tablet; Refill: 1    2. Neuropathic pain  -     DULoxetine (CYMBALTA) 30 MG capsule; Take 1 capsule by mouth Daily.  Dispense: 90 capsule; Refill: 1    3. Anxiety  -     DULoxetine (CYMBALTA) 30 MG capsule; Take 1 capsule by mouth Daily.  Dispense: 90 capsule; Refill: 1    4. Neuropathy  -     Discontinue: pregabalin (LYRICA) 50 MG capsule; Take 1 capsule by mouth 3 (Three) Times a Day for 3 days.  Dispense: 42 capsule; Refill: 0  -     pregabalin (LYRICA) 50 MG capsule; Take 1 capsule by mouth 3 (Three) Times a Day.  Dispense: 270 capsule; Refill: 1    5. Tinnitus of both ears  -     Ambulatory Referral to ENT (Otolaryngology)      Spent  12   minutes with chart and interview and consent for this encounter given by the patient.  You have chosen to receive care through a telehealth visit.  Do you consent to use a TELEPHONE connection for your medical care today? Yes  Patient was in their residence when this visit took place and I was in my medical office.

## 2023-03-15 RX ORDER — ATORVASTATIN CALCIUM 80 MG/1
TABLET, FILM COATED ORAL
Qty: 30 TABLET | Refills: 0 | Status: SHIPPED | OUTPATIENT
Start: 2023-03-15

## 2023-05-12 ENCOUNTER — HOSPITAL ENCOUNTER (OUTPATIENT)
Facility: HOSPITAL | Age: 83
LOS: 2 days | Discharge: HOME HEALTH | End: 2023-05-15
Attending: EMERGENCY MEDICINE | Admitting: INTERNAL MEDICINE
Payer: MEDICARE

## 2023-05-12 DIAGNOSIS — L97.429 DIABETIC ULCER OF LEFT MIDFOOT ASSOCIATED WITH TYPE 2 DIABETES MELLITUS, UNSPECIFIED ULCER STAGE: ICD-10-CM

## 2023-05-12 DIAGNOSIS — Z79.4 TYPE 2 DIABETES MELLITUS WITH DIABETIC POLYNEUROPATHY, WITH LONG-TERM CURRENT USE OF INSULIN: ICD-10-CM

## 2023-05-12 DIAGNOSIS — E11.621 DIABETIC ULCER OF LEFT MIDFOOT ASSOCIATED WITH TYPE 2 DIABETES MELLITUS, UNSPECIFIED ULCER STAGE: ICD-10-CM

## 2023-05-12 DIAGNOSIS — R53.1 GENERALIZED WEAKNESS: ICD-10-CM

## 2023-05-12 DIAGNOSIS — E11.42 TYPE 2 DIABETES MELLITUS WITH DIABETIC POLYNEUROPATHY, WITH LONG-TERM CURRENT USE OF INSULIN: ICD-10-CM

## 2023-05-12 DIAGNOSIS — N39.0 URINARY TRACT INFECTION WITHOUT HEMATURIA, SITE UNSPECIFIED: Primary | ICD-10-CM

## 2023-05-12 PROCEDURE — 99285 EMERGENCY DEPT VISIT HI MDM: CPT

## 2023-05-13 ENCOUNTER — APPOINTMENT (OUTPATIENT)
Dept: GENERAL RADIOLOGY | Facility: HOSPITAL | Age: 83
End: 2023-05-13
Payer: MEDICARE

## 2023-05-13 PROBLEM — M79.672 ACUTE PAIN OF LEFT FOOT: Status: ACTIVE | Noted: 2023-05-13

## 2023-05-13 LAB
ALBUMIN SERPL-MCNC: 3.8 G/DL (ref 3.5–5.2)
ALBUMIN/GLOB SERPL: 1.2 G/DL
ALP SERPL-CCNC: 117 U/L (ref 39–117)
ALT SERPL W P-5'-P-CCNC: 8 U/L (ref 1–33)
ANION GAP SERPL CALCULATED.3IONS-SCNC: 11 MMOL/L (ref 5–15)
ANION GAP SERPL CALCULATED.3IONS-SCNC: 12.4 MMOL/L (ref 5–15)
APTT PPP: 30.6 SECONDS (ref 22.7–35.4)
AST SERPL-CCNC: 6 U/L (ref 1–32)
B PARAPERT DNA SPEC QL NAA+PROBE: NOT DETECTED
B PERT DNA SPEC QL NAA+PROBE: NOT DETECTED
BACTERIA UR QL AUTO: ABNORMAL /HPF
BASOPHILS # BLD AUTO: 0.1 10*3/MM3 (ref 0–0.2)
BASOPHILS NFR BLD AUTO: 1.1 % (ref 0–1.5)
BILIRUB SERPL-MCNC: 0.2 MG/DL (ref 0–1.2)
BILIRUB UR QL STRIP: NEGATIVE
BUN SERPL-MCNC: 39 MG/DL (ref 8–23)
BUN SERPL-MCNC: 39 MG/DL (ref 8–23)
BUN/CREAT SERPL: 21.8 (ref 7–25)
BUN/CREAT SERPL: 25.7 (ref 7–25)
C PNEUM DNA NPH QL NAA+NON-PROBE: NOT DETECTED
CALCIUM SPEC-SCNC: 8.7 MG/DL (ref 8.6–10.5)
CALCIUM SPEC-SCNC: 9.4 MG/DL (ref 8.6–10.5)
CHLORIDE SERPL-SCNC: 101 MMOL/L (ref 98–107)
CHLORIDE SERPL-SCNC: 99 MMOL/L (ref 98–107)
CLARITY UR: ABNORMAL
CO2 SERPL-SCNC: 21.6 MMOL/L (ref 22–29)
CO2 SERPL-SCNC: 23 MMOL/L (ref 22–29)
COLOR UR: ABNORMAL
CREAT SERPL-MCNC: 1.52 MG/DL (ref 0.57–1)
CREAT SERPL-MCNC: 1.79 MG/DL (ref 0.57–1)
CRP SERPL-MCNC: 0.36 MG/DL (ref 0–0.5)
D-LACTATE SERPL-SCNC: 1.6 MMOL/L (ref 0.5–2)
D-LACTATE SERPL-SCNC: 2.7 MMOL/L (ref 0.5–2)
DEPRECATED RDW RBC AUTO: 40.6 FL (ref 37–54)
DEPRECATED RDW RBC AUTO: 42.6 FL (ref 37–54)
EGFRCR SERPLBLD CKD-EPI 2021: 28 ML/MIN/1.73
EGFRCR SERPLBLD CKD-EPI 2021: 34.1 ML/MIN/1.73
EOSINOPHIL # BLD AUTO: 0.34 10*3/MM3 (ref 0–0.4)
EOSINOPHIL NFR BLD AUTO: 3.8 % (ref 0.3–6.2)
ERYTHROCYTE [DISTWIDTH] IN BLOOD BY AUTOMATED COUNT: 13.4 % (ref 12.3–15.4)
ERYTHROCYTE [DISTWIDTH] IN BLOOD BY AUTOMATED COUNT: 13.5 % (ref 12.3–15.4)
ERYTHROCYTE [SEDIMENTATION RATE] IN BLOOD: 23 MM/HR (ref 0–30)
FLUAV SUBTYP SPEC NAA+PROBE: NOT DETECTED
FLUBV RNA ISLT QL NAA+PROBE: NOT DETECTED
GEN 5 2HR TROPONIN T REFLEX: 31 NG/L
GLOBULIN UR ELPH-MCNC: 3.3 GM/DL
GLUCOSE BLDC GLUCOMTR-MCNC: 201 MG/DL (ref 70–130)
GLUCOSE BLDC GLUCOMTR-MCNC: 276 MG/DL (ref 70–130)
GLUCOSE BLDC GLUCOMTR-MCNC: 326 MG/DL (ref 70–130)
GLUCOSE BLDC GLUCOMTR-MCNC: 353 MG/DL (ref 70–130)
GLUCOSE BLDC GLUCOMTR-MCNC: 425 MG/DL (ref 70–130)
GLUCOSE SERPL-MCNC: 270 MG/DL (ref 65–99)
GLUCOSE SERPL-MCNC: 415 MG/DL (ref 65–99)
GLUCOSE UR STRIP-MCNC: ABNORMAL MG/DL
HADV DNA SPEC NAA+PROBE: NOT DETECTED
HCOV 229E RNA SPEC QL NAA+PROBE: NOT DETECTED
HCOV HKU1 RNA SPEC QL NAA+PROBE: NOT DETECTED
HCOV NL63 RNA SPEC QL NAA+PROBE: NOT DETECTED
HCOV OC43 RNA SPEC QL NAA+PROBE: NOT DETECTED
HCT VFR BLD AUTO: 36.3 % (ref 34–46.6)
HCT VFR BLD AUTO: 37.1 % (ref 34–46.6)
HGB BLD-MCNC: 11.8 G/DL (ref 12–15.9)
HGB BLD-MCNC: 12.3 G/DL (ref 12–15.9)
HGB UR QL STRIP.AUTO: ABNORMAL
HMPV RNA NPH QL NAA+NON-PROBE: NOT DETECTED
HPIV1 RNA ISLT QL NAA+PROBE: NOT DETECTED
HPIV2 RNA SPEC QL NAA+PROBE: NOT DETECTED
HPIV3 RNA NPH QL NAA+PROBE: NOT DETECTED
HPIV4 P GENE NPH QL NAA+PROBE: NOT DETECTED
HYALINE CASTS UR QL AUTO: ABNORMAL /LPF
IMM GRANULOCYTES # BLD AUTO: 0.04 10*3/MM3 (ref 0–0.05)
IMM GRANULOCYTES NFR BLD AUTO: 0.4 % (ref 0–0.5)
INR PPP: 1.03 (ref 0.9–1.1)
KETONES UR QL STRIP: ABNORMAL
LEUKOCYTE ESTERASE UR QL STRIP.AUTO: ABNORMAL
LYMPHOCYTES # BLD AUTO: 1.95 10*3/MM3 (ref 0.7–3.1)
LYMPHOCYTES NFR BLD AUTO: 21.5 % (ref 19.6–45.3)
M PNEUMO IGG SER IA-ACNC: NOT DETECTED
MAGNESIUM SERPL-MCNC: 1.9 MG/DL (ref 1.6–2.4)
MCH RBC QN AUTO: 27.4 PG (ref 26.6–33)
MCH RBC QN AUTO: 28.7 PG (ref 26.6–33)
MCHC RBC AUTO-ENTMCNC: 31.8 G/DL (ref 31.5–35.7)
MCHC RBC AUTO-ENTMCNC: 33.9 G/DL (ref 31.5–35.7)
MCV RBC AUTO: 84.8 FL (ref 79–97)
MCV RBC AUTO: 86.1 FL (ref 79–97)
MONOCYTES # BLD AUTO: 0.75 10*3/MM3 (ref 0.1–0.9)
MONOCYTES NFR BLD AUTO: 8.3 % (ref 5–12)
NEUTROPHILS NFR BLD AUTO: 5.87 10*3/MM3 (ref 1.7–7)
NEUTROPHILS NFR BLD AUTO: 64.9 % (ref 42.7–76)
NITRITE UR QL STRIP: NEGATIVE
NRBC BLD AUTO-RTO: 0 /100 WBC (ref 0–0.2)
NT-PROBNP SERPL-MCNC: 217 PG/ML (ref 0–1800)
PH UR STRIP.AUTO: 5.5 [PH] (ref 5–8)
PLATELET # BLD AUTO: 299 10*3/MM3 (ref 140–450)
PLATELET # BLD AUTO: 347 10*3/MM3 (ref 140–450)
PMV BLD AUTO: 9.6 FL (ref 6–12)
PMV BLD AUTO: 9.6 FL (ref 6–12)
POTASSIUM SERPL-SCNC: 3.9 MMOL/L (ref 3.5–5.2)
POTASSIUM SERPL-SCNC: 4.5 MMOL/L (ref 3.5–5.2)
PROCALCITONIN SERPL-MCNC: 0.08 NG/ML (ref 0–0.25)
PROT SERPL-MCNC: 7.1 G/DL (ref 6–8.5)
PROT UR QL STRIP: ABNORMAL
PROTHROMBIN TIME: 13.6 SECONDS (ref 11.7–14.2)
RBC # BLD AUTO: 4.28 10*6/MM3 (ref 3.77–5.28)
RBC # BLD AUTO: 4.31 10*6/MM3 (ref 3.77–5.28)
RBC # UR STRIP: ABNORMAL /HPF
REF LAB TEST METHOD: ABNORMAL
RHINOVIRUS RNA SPEC NAA+PROBE: NOT DETECTED
RSV RNA NPH QL NAA+NON-PROBE: NOT DETECTED
SARS-COV-2 RNA NPH QL NAA+NON-PROBE: NOT DETECTED
SODIUM SERPL-SCNC: 133 MMOL/L (ref 136–145)
SODIUM SERPL-SCNC: 135 MMOL/L (ref 136–145)
SP GR UR STRIP: 1.02 (ref 1–1.03)
SQUAMOUS #/AREA URNS HPF: ABNORMAL /HPF
TROPONIN T DELTA: -2 NG/L
TROPONIN T SERPL HS-MCNC: 33 NG/L
UROBILINOGEN UR QL STRIP: ABNORMAL
WBC # UR STRIP: ABNORMAL /HPF
WBC NRBC COR # BLD: 8.78 10*3/MM3 (ref 3.4–10.8)
WBC NRBC COR # BLD: 9.05 10*3/MM3 (ref 3.4–10.8)

## 2023-05-13 PROCEDURE — 85027 COMPLETE CBC AUTOMATED: CPT | Performed by: NURSE PRACTITIONER

## 2023-05-13 PROCEDURE — 85610 PROTHROMBIN TIME: CPT | Performed by: EMERGENCY MEDICINE

## 2023-05-13 PROCEDURE — 80053 COMPREHEN METABOLIC PANEL: CPT | Performed by: EMERGENCY MEDICINE

## 2023-05-13 PROCEDURE — 25010000002 CEFTRIAXONE PER 250 MG: Performed by: EMERGENCY MEDICINE

## 2023-05-13 PROCEDURE — 86140 C-REACTIVE PROTEIN: CPT | Performed by: EMERGENCY MEDICINE

## 2023-05-13 PROCEDURE — 96365 THER/PROPH/DIAG IV INF INIT: CPT

## 2023-05-13 PROCEDURE — 85025 COMPLETE CBC W/AUTO DIFF WBC: CPT | Performed by: EMERGENCY MEDICINE

## 2023-05-13 PROCEDURE — 82948 REAGENT STRIP/BLOOD GLUCOSE: CPT

## 2023-05-13 PROCEDURE — 71045 X-RAY EXAM CHEST 1 VIEW: CPT

## 2023-05-13 PROCEDURE — 63710000001 INSULIN LISPRO (HUMAN) PER 5 UNITS: Performed by: NURSE PRACTITIONER

## 2023-05-13 PROCEDURE — 83735 ASSAY OF MAGNESIUM: CPT | Performed by: EMERGENCY MEDICINE

## 2023-05-13 PROCEDURE — 63710000001 INSULIN REGULAR HUMAN PER 5 UNITS: Performed by: EMERGENCY MEDICINE

## 2023-05-13 PROCEDURE — 85652 RBC SED RATE AUTOMATED: CPT | Performed by: EMERGENCY MEDICINE

## 2023-05-13 PROCEDURE — 0202U NFCT DS 22 TRGT SARS-COV-2: CPT | Performed by: EMERGENCY MEDICINE

## 2023-05-13 PROCEDURE — 81001 URINALYSIS AUTO W/SCOPE: CPT | Performed by: EMERGENCY MEDICINE

## 2023-05-13 PROCEDURE — G0378 HOSPITAL OBSERVATION PER HR: HCPCS

## 2023-05-13 PROCEDURE — 85730 THROMBOPLASTIN TIME PARTIAL: CPT | Performed by: EMERGENCY MEDICINE

## 2023-05-13 PROCEDURE — 87086 URINE CULTURE/COLONY COUNT: CPT | Performed by: EMERGENCY MEDICINE

## 2023-05-13 PROCEDURE — 84484 ASSAY OF TROPONIN QUANT: CPT | Performed by: EMERGENCY MEDICINE

## 2023-05-13 PROCEDURE — 73630 X-RAY EXAM OF FOOT: CPT

## 2023-05-13 PROCEDURE — P9612 CATHETERIZE FOR URINE SPEC: HCPCS

## 2023-05-13 PROCEDURE — 83880 ASSAY OF NATRIURETIC PEPTIDE: CPT | Performed by: EMERGENCY MEDICINE

## 2023-05-13 PROCEDURE — 83605 ASSAY OF LACTIC ACID: CPT | Performed by: EMERGENCY MEDICINE

## 2023-05-13 PROCEDURE — 93005 ELECTROCARDIOGRAM TRACING: CPT | Performed by: EMERGENCY MEDICINE

## 2023-05-13 PROCEDURE — 87040 BLOOD CULTURE FOR BACTERIA: CPT | Performed by: EMERGENCY MEDICINE

## 2023-05-13 PROCEDURE — 36415 COLL VENOUS BLD VENIPUNCTURE: CPT

## 2023-05-13 PROCEDURE — 96361 HYDRATE IV INFUSION ADD-ON: CPT

## 2023-05-13 PROCEDURE — 84145 PROCALCITONIN (PCT): CPT | Performed by: EMERGENCY MEDICINE

## 2023-05-13 RX ORDER — CHOLECALCIFEROL (VITAMIN D3) 125 MCG
1000 CAPSULE ORAL DAILY
Status: DISCONTINUED | OUTPATIENT
Start: 2023-05-13 | End: 2023-05-15 | Stop reason: HOSPADM

## 2023-05-13 RX ORDER — UREA 10 %
3 LOTION (ML) TOPICAL NIGHTLY
Status: DISCONTINUED | OUTPATIENT
Start: 2023-05-13 | End: 2023-05-15 | Stop reason: HOSPADM

## 2023-05-13 RX ORDER — NITROGLYCERIN 0.4 MG/1
0.4 TABLET SUBLINGUAL
Status: DISCONTINUED | OUTPATIENT
Start: 2023-05-13 | End: 2023-05-15 | Stop reason: HOSPADM

## 2023-05-13 RX ORDER — SODIUM CHLORIDE 0.9 % (FLUSH) 0.9 %
10 SYRINGE (ML) INJECTION AS NEEDED
Status: DISCONTINUED | OUTPATIENT
Start: 2023-05-13 | End: 2023-05-15 | Stop reason: HOSPADM

## 2023-05-13 RX ORDER — IBUPROFEN 600 MG/1
1 TABLET ORAL
Status: DISCONTINUED | OUTPATIENT
Start: 2023-05-13 | End: 2023-05-15 | Stop reason: HOSPADM

## 2023-05-13 RX ORDER — AMLODIPINE BESYLATE 5 MG/1
5 TABLET ORAL DAILY
Status: DISCONTINUED | OUTPATIENT
Start: 2023-05-13 | End: 2023-05-15 | Stop reason: HOSPADM

## 2023-05-13 RX ORDER — INSULIN LISPRO 100 [IU]/ML
2-9 INJECTION, SOLUTION INTRAVENOUS; SUBCUTANEOUS
Status: DISCONTINUED | OUTPATIENT
Start: 2023-05-13 | End: 2023-05-15 | Stop reason: HOSPADM

## 2023-05-13 RX ORDER — HYDROCODONE BITARTRATE AND ACETAMINOPHEN 5; 325 MG/1; MG/1
1 TABLET ORAL EVERY 6 HOURS PRN
Status: DISCONTINUED | OUTPATIENT
Start: 2023-05-13 | End: 2023-05-15 | Stop reason: HOSPADM

## 2023-05-13 RX ORDER — CALCIUM CARBONATE 200(500)MG
2 TABLET,CHEWABLE ORAL 2 TIMES DAILY PRN
Status: DISCONTINUED | OUTPATIENT
Start: 2023-05-13 | End: 2023-05-15 | Stop reason: HOSPADM

## 2023-05-13 RX ORDER — DEXTROSE MONOHYDRATE 25 G/50ML
25 INJECTION, SOLUTION INTRAVENOUS
Status: DISCONTINUED | OUTPATIENT
Start: 2023-05-13 | End: 2023-05-15 | Stop reason: HOSPADM

## 2023-05-13 RX ORDER — BISOPROLOL FUMARATE 5 MG/1
5 TABLET, FILM COATED ORAL DAILY
Status: DISCONTINUED | OUTPATIENT
Start: 2023-05-13 | End: 2023-05-15 | Stop reason: HOSPADM

## 2023-05-13 RX ORDER — DULOXETIN HYDROCHLORIDE 30 MG/1
30 CAPSULE, DELAYED RELEASE ORAL DAILY
Status: DISCONTINUED | OUTPATIENT
Start: 2023-05-13 | End: 2023-05-15 | Stop reason: HOSPADM

## 2023-05-13 RX ORDER — LEVOTHYROXINE SODIUM 112 UG/1
112 TABLET ORAL DAILY
Status: DISCONTINUED | OUTPATIENT
Start: 2023-05-13 | End: 2023-05-15 | Stop reason: HOSPADM

## 2023-05-13 RX ORDER — INSULIN LISPRO 100 [IU]/ML
8 INJECTION, SOLUTION INTRAVENOUS; SUBCUTANEOUS
Status: DISCONTINUED | OUTPATIENT
Start: 2023-05-13 | End: 2023-05-14

## 2023-05-13 RX ORDER — SODIUM CHLORIDE 9 MG/ML
40 INJECTION, SOLUTION INTRAVENOUS AS NEEDED
Status: DISCONTINUED | OUTPATIENT
Start: 2023-05-13 | End: 2023-05-15 | Stop reason: HOSPADM

## 2023-05-13 RX ORDER — ONDANSETRON 2 MG/ML
4 INJECTION INTRAMUSCULAR; INTRAVENOUS EVERY 6 HOURS PRN
Status: DISCONTINUED | OUTPATIENT
Start: 2023-05-13 | End: 2023-05-15 | Stop reason: HOSPADM

## 2023-05-13 RX ORDER — ACETAMINOPHEN 160 MG/5ML
650 SOLUTION ORAL EVERY 4 HOURS PRN
Status: DISCONTINUED | OUTPATIENT
Start: 2023-05-13 | End: 2023-05-15 | Stop reason: HOSPADM

## 2023-05-13 RX ORDER — SODIUM CHLORIDE 9 MG/ML
100 INJECTION, SOLUTION INTRAVENOUS CONTINUOUS
Status: DISCONTINUED | OUTPATIENT
Start: 2023-05-13 | End: 2023-05-14

## 2023-05-13 RX ORDER — ACETAMINOPHEN 325 MG/1
650 TABLET ORAL EVERY 4 HOURS PRN
Status: DISCONTINUED | OUTPATIENT
Start: 2023-05-13 | End: 2023-05-15 | Stop reason: HOSPADM

## 2023-05-13 RX ORDER — PREGABALIN 50 MG/1
50 CAPSULE ORAL 3 TIMES DAILY
Status: DISCONTINUED | OUTPATIENT
Start: 2023-05-13 | End: 2023-05-15 | Stop reason: HOSPADM

## 2023-05-13 RX ORDER — PANTOPRAZOLE SODIUM 40 MG/1
40 TABLET, DELAYED RELEASE ORAL
Status: DISCONTINUED | OUTPATIENT
Start: 2023-05-14 | End: 2023-05-15 | Stop reason: HOSPADM

## 2023-05-13 RX ORDER — NICOTINE POLACRILEX 4 MG
15 LOZENGE BUCCAL
Status: DISCONTINUED | OUTPATIENT
Start: 2023-05-13 | End: 2023-05-15 | Stop reason: HOSPADM

## 2023-05-13 RX ORDER — ONDANSETRON 4 MG/1
4 TABLET, FILM COATED ORAL EVERY 6 HOURS PRN
Status: DISCONTINUED | OUTPATIENT
Start: 2023-05-13 | End: 2023-05-15 | Stop reason: HOSPADM

## 2023-05-13 RX ORDER — ATORVASTATIN CALCIUM 80 MG/1
80 TABLET, FILM COATED ORAL NIGHTLY
Status: DISCONTINUED | OUTPATIENT
Start: 2023-05-13 | End: 2023-05-15 | Stop reason: HOSPADM

## 2023-05-13 RX ORDER — SODIUM CHLORIDE 0.9 % (FLUSH) 0.9 %
10 SYRINGE (ML) INJECTION EVERY 12 HOURS SCHEDULED
Status: DISCONTINUED | OUTPATIENT
Start: 2023-05-13 | End: 2023-05-15 | Stop reason: HOSPADM

## 2023-05-13 RX ORDER — ACETAMINOPHEN 650 MG/1
650 SUPPOSITORY RECTAL EVERY 4 HOURS PRN
Status: DISCONTINUED | OUTPATIENT
Start: 2023-05-13 | End: 2023-05-15 | Stop reason: HOSPADM

## 2023-05-13 RX ADMIN — ATORVASTATIN CALCIUM 80 MG: 80 TABLET, FILM COATED ORAL at 20:16

## 2023-05-13 RX ADMIN — CEFTRIAXONE SODIUM 1 G: 1 INJECTION, POWDER, FOR SOLUTION INTRAMUSCULAR; INTRAVENOUS at 02:18

## 2023-05-13 RX ADMIN — Medication 1000 MCG: at 20:16

## 2023-05-13 RX ADMIN — SODIUM CHLORIDE 100 ML/HR: 9 INJECTION, SOLUTION INTRAVENOUS at 06:00

## 2023-05-13 RX ADMIN — INSULIN GLARGINE-YFGN 40 UNITS: 100 INJECTION, SOLUTION SUBCUTANEOUS at 20:17

## 2023-05-13 RX ADMIN — INSULIN LISPRO 4 UNITS: 100 INJECTION, SOLUTION INTRAVENOUS; SUBCUTANEOUS at 08:32

## 2023-05-13 RX ADMIN — BISOPROLOL FUMARATE 5 MG: 5 TABLET, FILM COATED ORAL at 20:16

## 2023-05-13 RX ADMIN — INSULIN HUMAN 5 UNITS: 100 INJECTION, SOLUTION PARENTERAL at 06:22

## 2023-05-13 RX ADMIN — LEVOTHYROXINE SODIUM 112 MCG: 0.11 TABLET ORAL at 20:16

## 2023-05-13 RX ADMIN — Medication 10 ML: at 08:34

## 2023-05-13 RX ADMIN — HYDROCODONE BITARTRATE AND ACETAMINOPHEN 1 TABLET: 5; 325 TABLET ORAL at 23:45

## 2023-05-13 RX ADMIN — Medication 3 MG: at 20:16

## 2023-05-13 RX ADMIN — DULOXETINE HYDROCHLORIDE 30 MG: 30 CAPSULE, DELAYED RELEASE ORAL at 20:16

## 2023-05-13 RX ADMIN — AMLODIPINE BESYLATE 5 MG: 5 TABLET ORAL at 20:16

## 2023-05-13 RX ADMIN — PREGABALIN 50 MG: 50 CAPSULE ORAL at 20:16

## 2023-05-13 RX ADMIN — SODIUM CHLORIDE 100 ML/HR: 9 INJECTION, SOLUTION INTRAVENOUS at 20:26

## 2023-05-13 RX ADMIN — INSULIN LISPRO 6 UNITS: 100 INJECTION, SOLUTION INTRAVENOUS; SUBCUTANEOUS at 17:18

## 2023-05-13 RX ADMIN — INSULIN HUMAN 5 UNITS: 100 INJECTION, SOLUTION PARENTERAL at 04:05

## 2023-05-13 RX ADMIN — INSULIN LISPRO 8 UNITS: 100 INJECTION, SOLUTION INTRAVENOUS; SUBCUTANEOUS at 13:19

## 2023-05-13 NOTE — PLAN OF CARE
Goal Outcome Evaluation:  Plan of Care Reviewed With: patient        Progress: improving  Outcome Evaluation: Pt is a 82 year old female admitted for UTI. Initially came in for diabetic foot ulcer. Wound consulted. VSS. A&Ox4. MRi planned for tomorrow. X-ray of L foot negative. IV Abx. Bs stable. No other events during shift.

## 2023-05-13 NOTE — ED PROVIDER NOTES
EMERGENCY DEPARTMENT ENCOUNTER    Room Number:  11/11  Date of encounter:  5/13/2023  PCP: Napoleon Mead MD  Patient Care Team:  Napoleon Mead MD as PCP - General  Napoleon Mead MD as PCP - Family Medicine   Independent Historians: Patient, family    HPI:  Chief Complaint: Pain to left foot, UTI x1 month, diarrhea    A complete HPI/ROS/PMH/PSH/SH/FH are unobtainable due to: None    Chronic or social conditions impacting patient care (Social Determinants of Health): None  (Financial Resource Strain / Food Insecurity / Transportation Needs / Physical Activity / Stress / Social Connections / Intimate Partner Violence / Housing Stability)    Context: Halie Guidry is a 82 y.o. female who presents to the ED c/o pain to left foot which has been on for the past several days family noticed that she has an ulceration to the sole of her foot consistent with diabetic states foot ulcer today.  No fevers or chills no shortness of breath.  Patient has been treated with multiple rounds of antibiotics outpatient but continues to have suprapubic tenderness dysuria and urinary frequency.  Patient also has developed diarrhea over the last couple days.  Has been sleeping sitting up in a chair because of having to wake up and go to the bathroom suddenly at night.  Patient apparently has also developed some ulcerations on her buttocks related to this.  No shortness of breath no chest pain no abdominal pain.  No fevers or chills.    Review of prior external notes (non-ED) -and- Review of prior external test results outside of this encounter: I reviewed patient's primary care office visit from 5/2/2023    Prescription drug monitoring program review:         PAST MEDICAL HISTORY  Active Ambulatory Problems     Diagnosis Date Noted   • Compression fracture 04/22/2009   • Lumbar degenerative disc disease 07/05/2012   • Type 2 diabetes mellitus, with long-term current use of insulin    • Hyperlipidemia    • Benign essential  hypertension 08/20/2014   • Primary hypothyroidism    • Leukocytosis    • Neuropathy    • Osteoporosis 09/09/2015   • Proteinuria 02/28/2012   • Tobacco abuse 08/22/2013   • Diabetic eye exam 02/12/2014   • Generalized weakness 05/27/2017   • Spinal stenosis 05/27/2017   • Scoliosis 05/27/2017   • Arthritis 05/27/2017   • VBI (vertebrobasilar insufficiency) 05/28/2017   • Orthostatic hypotension 05/28/2017   • 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 05/28/2017   • Severe hypothyroidism 05/30/2017   • Noncompliance with medication regimen 05/30/2017   • Vitamin D deficiency disease 06/01/2017   • Altered mental status 12/15/2017   • Tremor 01/09/2018   • Seizure (HCC) 05/21/2019   • Stage 3a chronic kidney disease 03/09/2012   • Thoracic degenerative disc disease 01/27/2020   • Metabolic encephalopathy 12/02/2021   • Hyperglycemia 12/02/2021   • Type II diabetes mellitus, uncontrolled 12/02/2021   • Lower abdominal pain 02/23/2022   • Transaminitis 02/23/2022   • COVID-19 virus detected 02/23/2022   • UTI (urinary tract infection) 02/23/2022   • 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   • Choledocholithiasis 02/23/2022   • Hypokalemia 02/24/2022   • Hypoxia 02/24/2022   • Generalized abdominal pain 03/26/2022   • History of Clostridium difficile infection 03/26/2022   • History of ERCP 03/26/2022   • Acute UTI (urinary tract infection) 03/27/2022   • Hypomagnesemia 03/28/2022   • Burning pain 05/27/2022   • Anxiety disorder 05/27/2022   • Neuropathic pain 05/27/2022   • Intertrigo 05/27/2022   • Weakness of both lower extremities 06/24/2022   • Accelerated hypertension 09/01/2022   • Acute cystitis without hematuria 09/06/2022   • Altered mental status, unspecified altered mental status type 11/04/2022   • Left lower lobe pneumonia 11/04/2022     Resolved Ambulatory Problems     Diagnosis Date Noted   • VIPUL (acute kidney injury) 12/02/2021   • Acute metabolic encephalopathy 06/22/2022   • Hypoglycemia  06/23/2022   • Acute metabolic encephalopathy 06/24/2022   • Diarrhea 11/04/2022     Past Medical History:   Diagnosis Date   • Anxiety    • Bleeding disorder    • Depression    • Diabetes    • Diabetes mellitus, type 2    • Disc degeneration, lumbar    • Headache, tension-type    • Hypothyroidism    • Peripheral neuropathy    • Rotator cuff tear, left    • Shoulder pain          PAST SURGICAL HISTORY  Past Surgical History:   Procedure Laterality Date   • APPENDECTOMY     • BILATERAL BREAST REDUCTION Bilateral 08/2015   • CATARACT EXTRACTION  03/2015   • CHOLECYSTECTOMY WITH INTRAOPERATIVE CHOLANGIOGRAM N/A 3/27/2022    Procedure: CHOLECYSTECTOMY LAPAROSCOPIC INTRAOPERATIVE CHOLANGIOGRAM;  Surgeon: Aiyana Hill MD;  Location: [facility] MAIN OR;  Service: General;  Laterality: N/A;   • COLONOSCOPY  06/05/2015    WNL   • ERCP N/A 2/25/2022    Procedure: ENDOSCOPIC RETROGRADE CHOLANGIOPANCREATOGRAPHY with sphincterotomy and balloon sweep;  Surgeon: Chilo Wilhelm MD;  Location: [facility] ENDOSCOPY;  Service: Gastroenterology;  Laterality: N/A;  PRE/POST - CBD stones   • ERCP N/A 3/28/2022    Procedure: ENDOSCOPIC RETROGRADE CHOLANGIOPANCREATOGRAPHY WITH SPHINCTEROTOMY AND BALLOON SWEEP;  Surgeon: Chilo Wilhelm MD;  Location: [facility] ENDOSCOPY;  Service: Gastroenterology;  Laterality: N/A;  PRE: COMMON DUCT STONE  POST: COMMON DUCT STONE   • KYPHOPLASTY     • REDUCTION MAMMAPLASTY     • TONSILLECTOMY           FAMILY HISTORY  Family History   Problem Relation Age of Onset   • Bone cancer Mother    • No Known Problems Father          SOCIAL HISTORY  Social History     Socioeconomic History   • Marital status:    Tobacco Use   • Smoking status: Former     Packs/day: 1.00     Types: Cigarettes     Quit date: 2013     Years since quitting: 10.3   • Smokeless tobacco: Never   Vaping Use   • Vaping Use: Never used   Substance and Sexual Activity   • Alcohol use: No   • Drug use: No   • Sexual activity:  Defer         ALLERGIES  Sulfa antibiotics        REVIEW OF SYSTEMS  Review of Systems  Included in HPI  All systems reviewed and negative except for those discussed in HPI.      PHYSICAL EXAM    I have reviewed the triage vital signs and nursing notes.    ED Triage Vitals   Temp Heart Rate Resp BP SpO2   05/12/23 2206 05/12/23 2206 05/12/23 2206 05/12/23 2206 05/12/23 2206   97.2 °F (36.2 °C) 61 18 133/60 92 %      Temp src Heart Rate Source Patient Position BP Location FiO2 (%)   05/12/23 2206 -- 05/12/23 2341 05/12/23 2341 --   Tympanic  Lying Right arm        Physical Exam  GENERAL: alert, no acute distress  SKIN: Warm, dry, ulceration with surrounding erythema and tenderness to sole of left foot erythema extends across the dorsum which is nontender strong peripheral pulses  HENT: Normocephalic, atraumatic  EYES: no scleral icterus  CV: regular rhythm, regular rate  RESPIRATORY: normal effort, lungs clear  ABDOMEN: soft, nontender, nondistended  MUSCULOSKELETAL: no deformity  NEURO: alert, moves all extremities, follows commands                                                               LAB RESULTS  Recent Results (from the past 24 hour(s))   ECG 12 Lead Dyspnea    Collection Time: 05/13/23 12:45 AM   Result Value Ref Range    QT Interval 482 ms   Urinalysis With Microscopic If Indicated (No Culture) - Straight Cath    Collection Time: 05/13/23  1:43 AM    Specimen: Straight Cath; Urine   Result Value Ref Range    Color, UA Dark Yellow (A) Yellow, Straw    Appearance, UA Turbid (A) Clear    pH, UA 5.5 5.0 - 8.0    Specific Gravity, UA 1.021 1.005 - 1.030    Glucose,  mg/dL (1+) (A) Negative    Ketones, UA Trace (A) Negative    Bilirubin, UA Negative Negative    Blood, UA Moderate (2+) (A) Negative    Protein, UA >=300 mg/dL (3+) (A) Negative    Leuk Esterase, UA Large (3+) (A) Negative    Nitrite, UA Negative Negative    Urobilinogen, UA 1.0 E.U./dL 0.2 - 1.0 E.U./dL   Respiratory Panel PCR  w/COVID-19(SARS-CoV-2) NAY/JEANIE/GAYLE/PAD/COR/MAD/YANN In-House, NP Swab in UTM/VTM, 3-4 HR TAT - Swab, Nasopharynx    Collection Time: 05/13/23  1:43 AM    Specimen: Nasopharynx; Swab   Result Value Ref Range    ADENOVIRUS, PCR Not Detected Not Detected    Coronavirus 229E Not Detected Not Detected    Coronavirus HKU1 Not Detected Not Detected    Coronavirus NL63 Not Detected Not Detected    Coronavirus OC43 Not Detected Not Detected    COVID19 Not Detected Not Detected - Ref. Range    Human Metapneumovirus Not Detected Not Detected    Human Rhinovirus/Enterovirus Not Detected Not Detected    Influenza A PCR Not Detected Not Detected    Influenza B PCR Not Detected Not Detected    Parainfluenza Virus 1 Not Detected Not Detected    Parainfluenza Virus 2 Not Detected Not Detected    Parainfluenza Virus 3 Not Detected Not Detected    Parainfluenza Virus 4 Not Detected Not Detected    RSV, PCR Not Detected Not Detected    Bordetella pertussis pcr Not Detected Not Detected    Bordetella parapertussis PCR Not Detected Not Detected    Chlamydophila pneumoniae PCR Not Detected Not Detected    Mycoplasma pneumo by PCR Not Detected Not Detected   Urinalysis, Microscopic Only - Straight Cath    Collection Time: 05/13/23  1:43 AM    Specimen: Straight Cath; Urine   Result Value Ref Range    RBC, UA Unable to determine due to loaded field (A) None Seen, 0-2 /HPF    WBC, UA Too Numerous to Count (A) None Seen, 0-2 /HPF    Bacteria, UA Unable to determine due to loaded field (A) None Seen /HPF    Squamous Epithelial Cells, UA Unable to determine due to loaded field (A) None Seen, 0-2 /HPF    Hyaline Casts, UA Unable to determine due to loaded field None Seen /LPF    Methodology Manual Light Microscopy    Comprehensive Metabolic Panel    Collection Time: 05/13/23  2:13 AM    Specimen: Arm, Right; Blood   Result Value Ref Range    Glucose 415 (C) 65 - 99 mg/dL    BUN 39 (H) 8 - 23 mg/dL    Creatinine 1.79 (H) 0.57 - 1.00 mg/dL     Sodium 133 (L) 136 - 145 mmol/L    Potassium 4.5 3.5 - 5.2 mmol/L    Chloride 99 98 - 107 mmol/L    CO2 21.6 (L) 22.0 - 29.0 mmol/L    Calcium 9.4 8.6 - 10.5 mg/dL    Total Protein 7.1 6.0 - 8.5 g/dL    Albumin 3.8 3.5 - 5.2 g/dL    ALT (SGPT) 8 1 - 33 U/L    AST (SGOT) 6 1 - 32 U/L    Alkaline Phosphatase 117 39 - 117 U/L    Total Bilirubin 0.2 0.0 - 1.2 mg/dL    Globulin 3.3 gm/dL    A/G Ratio 1.2 g/dL    BUN/Creatinine Ratio 21.8 7.0 - 25.0    Anion Gap 12.4 5.0 - 15.0 mmol/L    eGFR 28.0 (L) >60.0 mL/min/1.73   Protime-INR    Collection Time: 05/13/23  2:13 AM    Specimen: Arm, Right; Blood   Result Value Ref Range    Protime 13.6 11.7 - 14.2 Seconds    INR 1.03 0.90 - 1.10   aPTT    Collection Time: 05/13/23  2:13 AM    Specimen: Arm, Right; Blood   Result Value Ref Range    PTT 30.6 22.7 - 35.4 seconds   BNP    Collection Time: 05/13/23  2:13 AM    Specimen: Arm, Right; Blood   Result Value Ref Range    proBNP 217.0 0.0 - 1,800.0 pg/mL   High Sensitivity Troponin T    Collection Time: 05/13/23  2:13 AM    Specimen: Arm, Right; Blood   Result Value Ref Range    HS Troponin T 33 (H) <10 ng/L   Lactic Acid, Plasma    Collection Time: 05/13/23  2:13 AM    Specimen: Arm, Right; Blood   Result Value Ref Range    Lactate 2.7 (C) 0.5 - 2.0 mmol/L   Procalcitonin    Collection Time: 05/13/23  2:13 AM    Specimen: Arm, Right; Blood   Result Value Ref Range    Procalcitonin 0.08 0.00 - 0.25 ng/mL   Magnesium    Collection Time: 05/13/23  2:13 AM    Specimen: Arm, Right; Blood   Result Value Ref Range    Magnesium 1.9 1.6 - 2.4 mg/dL   CBC Auto Differential    Collection Time: 05/13/23  2:13 AM    Specimen: Arm, Right; Blood   Result Value Ref Range    WBC 9.05 3.40 - 10.80 10*3/mm3    RBC 4.28 3.77 - 5.28 10*6/mm3    Hemoglobin 12.3 12.0 - 15.9 g/dL    Hematocrit 36.3 34.0 - 46.6 %    MCV 84.8 79.0 - 97.0 fL    MCH 28.7 26.6 - 33.0 pg    MCHC 33.9 31.5 - 35.7 g/dL    RDW 13.4 12.3 - 15.4 %    RDW-SD 40.6 37.0 - 54.0 fl     MPV 9.6 6.0 - 12.0 fL    Platelets 347 140 - 450 10*3/mm3    Neutrophil % 64.9 42.7 - 76.0 %    Lymphocyte % 21.5 19.6 - 45.3 %    Monocyte % 8.3 5.0 - 12.0 %    Eosinophil % 3.8 0.3 - 6.2 %    Basophil % 1.1 0.0 - 1.5 %    Immature Grans % 0.4 0.0 - 0.5 %    Neutrophils, Absolute 5.87 1.70 - 7.00 10*3/mm3    Lymphocytes, Absolute 1.95 0.70 - 3.10 10*3/mm3    Monocytes, Absolute 0.75 0.10 - 0.90 10*3/mm3    Eosinophils, Absolute 0.34 0.00 - 0.40 10*3/mm3    Basophils, Absolute 0.10 0.00 - 0.20 10*3/mm3    Immature Grans, Absolute 0.04 0.00 - 0.05 10*3/mm3    nRBC 0.0 0.0 - 0.2 /100 WBC   Sedimentation Rate    Collection Time: 05/13/23  2:13 AM    Specimen: Arm, Right; Blood   Result Value Ref Range    Sed Rate 23 0 - 30 mm/hr   C-reactive Protein    Collection Time: 05/13/23  2:13 AM    Specimen: Arm, Right; Blood   Result Value Ref Range    C-Reactive Protein 0.36 0.00 - 0.50 mg/dL   POC Glucose Once    Collection Time: 05/13/23  4:34 AM    Specimen: Blood   Result Value Ref Range    Glucose 326 (H) 70 - 130 mg/dL       Ordered the above labs and independently reviewed the results.        RADIOLOGY  XR Foot 3+ View Left    Result Date: 5/13/2023  3 VIEWS LEFT FOOT  HISTORY: Diabetic foot ulcer  COMPARISON: None available.  FINDINGS: No definite acute fracture or subluxation of left foot is seen, although images are degraded by the patient's osteoporosis. There is some left lower extremity edema. No definite aggressive osseous abnormalities are seen.      No acute osseous findings, although the exam is degraded by the patient's osteoporosis  This report was finalized on 5/13/2023 1:14 AM by Dr. Kristina Forman M.D.      XR Chest 1 View    Result Date: 5/13/2023  SINGLE VIEW OF THE CHEST  HISTORY: Shortness of air  COMPARISON: 11/03/2022  FINDINGS: Heart size is within normal limits. There is some calcification of the aorta. No pneumothorax, pleural effusion, or acute infiltrate is seen.      No acute findings.   This report was finalized on 5/13/2023 1:15 AM by Dr. Kristina Forman M.D.        I ordered the above noted radiological studies. Reviewed by me and discussed with radiologist.  See dictation for official radiology interpretation.      PROCEDURES    Procedures      MEDICATIONS GIVEN IN ER    Medications   sodium chloride 0.9 % flush 10 mL (has no administration in time range)   insulin regular (humuLIN R,novoLIN R) injection 5 Units (has no administration in time range)   sodium chloride 0.9 % flush 10 mL (has no administration in time range)   sodium chloride 0.9 % flush 10 mL (has no administration in time range)   sodium chloride 0.9 % infusion 40 mL (has no administration in time range)   nitroglycerin (NITROSTAT) SL tablet 0.4 mg (has no administration in time range)   acetaminophen (TYLENOL) tablet 650 mg (has no administration in time range)     Or   acetaminophen (TYLENOL) 160 MG/5ML solution 650 mg (has no administration in time range)     Or   acetaminophen (TYLENOL) suppository 650 mg (has no administration in time range)   sodium chloride 0.9 % infusion (has no administration in time range)   ondansetron (ZOFRAN) tablet 4 mg (has no administration in time range)     Or   ondansetron (ZOFRAN) injection 4 mg (has no administration in time range)   calcium carbonate (TUMS) chewable tablet 500 mg (200 mg elemental) (has no administration in time range)   cefTRIAXone (ROCEPHIN) 1 g in sodium chloride 0.9 % 100 mL IVPB-VTB (has no administration in time range)   dextrose (GLUTOSE) oral gel 15 g (has no administration in time range)   dextrose (D50W) (25 g/50 mL) IV injection 25 g (has no administration in time range)   glucagon (GLUCAGEN) injection 1 mg (has no administration in time range)   insulin lispro (HUMALOG/ADMELOG) injection 2-9 Units (has no administration in time range)   cefTRIAXone (ROCEPHIN) 1 g in sodium chloride 0.9 % 100 mL IVPB-VTB (0 g Intravenous Stopped 5/13/23 0300)   insulin regular  (humuLIN R,novoLIN R) injection 5 Units (5 Units Intravenous Given 5/13/23 7563)         ORDERS PLACED DURING THIS VISIT:  Orders Placed This Encounter   Procedures   • Blood Culture - Blood,   • Blood Culture - Blood,   • Respiratory Panel PCR w/COVID-19(SARS-CoV-2) NAY/JEANIE/GAYLE/PAD/COR/MAD/YANN In-House, NP Swab in UTM/VTM, 3-4 HR TAT - Swab, Nasopharynx   • Urine Culture - Urine, Urine, Clean Catch   • XR Chest 1 View   • XR Foot 3+ View Left   • Comprehensive Metabolic Panel   • Protime-INR   • aPTT   • Urinalysis With Microscopic If Indicated (No Culture) - Urine, Clean Catch   • BNP   • High Sensitivity Troponin T   • Lactic Acid, Plasma   • Procalcitonin   • Magnesium   • CBC Auto Differential   • Urinalysis, Microscopic Only - Urine, Clean Catch   • STAT Lactic Acid, Reflex   • High Sensitivity Troponin T 2Hr   • Sedimentation Rate   • C-reactive Protein   • Potassium   • Magnesium   • High Sensitivity Troponin T   • Blood Gas, Arterial -   • Basic Metabolic Panel   • CBC (No Diff)   • Diet: Diabetic Diets; Consistent Carbohydrate; Texture: Regular Texture (IDDSI 7); Fluid Consistency: Thin (IDDSI 0)   • Monitor Blood Pressure   • Pulse Oximetry, Continuous   • Please undress pt and place in gown to facilitate inspection of reported bed sores  Misc Nursing Order (Specify)   • Vital Signs   • Intake & Output   • Weigh Patient   • Oral Care   • Place Sequential Compression Device   • Maintain Sequential Compression Device   • Telemetry - Maintain IV Access   • Continuous Cardiac Monitoring   • Telemetry - Pulse Oximetry   • May Be Off Telemetry for Tests   • Code Status and Medical Interventions:   • Oxygen Therapy- Nasal Cannula; Titrate for SPO2: 90% - 95%   • Oxygen Therapy- Nasal Cannula; Titrate for SPO2: 90% - 95%   • POC Glucose Once   • POC Glucose Once   • POC Glucose TID AC   • ECG 12 Lead Dyspnea   • ECG 12 Lead Chest Pain   • Insert Peripheral IV   • Insert Peripheral IV   • Inpatient Admission   •  CBC & Differential         PROGRESS, DATA ANALYSIS, CONSULTS, AND MEDICAL DECISION MAKING    All labs have been independently interpreted by me.  All radiology studies have been reviewed by me and discussed with radiologist dictating the report.   EKG's independently viewed and interpreted by me.  Discussion below represents my analysis of pertinent findings related to patient's condition, differential diagnosis, treatment plan and final disposition.    Differential diagnosis includes but is not limited to: Diabetic foot ulceration, sepsis, UTI.    ED Course as of 05/13/23 0537   Sat May 13, 2023   0047 EKG          EKG time: 0045  Rhythm/Rate: Sinus rhythm rate 60  P waves and VA: Normal  QRS, axis: Right bundle branch block  ST and T waves: Normal    Interpreted Contemporaneously by me, independently viewed [TJ]   0204 I have reviewed patient's old urine culture which grew E. coli that was Macrobid and cefazolin resistant.  It was sensitive to Rocephin. [TJ]      ED Course User Index  [TJ] Bertram Clark MD       I interpreted the cardiac monitor rhythm and my independent interpretation is: normal sinus rhythm.     PPE: The patient wore a mask and I wore an N95 mask throughout the entire patient encounter.       AS OF 05:37 EDT VITALS:    BP - 134/53  HR - 59  TEMP - 97.2 °F (36.2 °C) (Tympanic)  O2 SATS - 90%        DIAGNOSIS  Final diagnoses:   Urinary tract infection without hematuria, site unspecified   Generalized weakness   Diabetic ulcer of left midfoot associated with type 2 diabetes mellitus, unspecified ulcer stage         DISPOSITION  ED Disposition     ED Disposition   Decision to Admit    Condition   --    Comment   Level of Care: Telemetry [5]   Diagnosis: Urinary tract infection without hematuria, site unspecified [[id number]]   Admitting Physician: JULIANA ZULETA [[id number]]   Certification: I Certify That Inpatient Hospital Services Are Medically Necessary For Greater Than 2 Midnights                   Note Disclaimer: At [facility], we believe that sharing information builds trust and better relationships. You are receiving this note because you recently visited [facility]. It is possible you will see health information before a provider has talked with you about it. This kind of information can be easy to misunderstand. To help you fully understand what it means for your health, we urge you to discuss this note with your provider.       Bertram Clark MD  05/13/23 0537

## 2023-05-13 NOTE — PAYOR COMM NOTE
"Tabatha Guidry (82 y.o. Female)     ATTN: NURSE REVIEWER  RE: INITIAL INPT AUTH REQUEST CLINICALS  AUTH: [id number]  PLEASE REPLY TO ANTIONE SJanet [phone] OR FAX# [phone]    Date of Birth   1940    Social Security Number       Address   [address] [place] [address] [address] [place] [address]    Home Phone   [phone]    MRN   [id number]       Yarsanism   Mandaeism    Marital Status                               Admission Date   5/12/23    Admission Type   Emergency    Admitting Provider   Tang Beal MD    Attending Provider   Tang Beal MD    Department, Room/Bed   [facility] Emergency Department, 11/11       Discharge Date       Discharge Disposition       Discharge Destination                               Attending Provider: Tang Beal MD    Allergies: Sulfa Antibiotics    Isolation: None   Infection: COVID (rule out) (05/13/23)   Code Status: CPR    Ht: 172.7 cm (68\")   Wt: 77.1 kg (170 lb)    Admission Cmt: None   Principal Problem: None                Active Insurance as of 5/12/2023     Primary Coverage     Payor Plan Insurance Group Employer/Plan Group    [employer] MEDICARE REPLACEMENT UNITED [facility] MEDICARE REPLACEMENT [id number]     Payor Plan Address Payor Plan Phone Number Payor Plan Fax Number Effective Dates    [address] [address]   1/1/2016 - None Entered    [facility] [id number]       Subscriber Name Subscriber Birth Date Member ID       TABATHA GUIDRY 1940 [id number]                 Emergency Contacts      (Rel.) Home Phone Work Phone Mobile Phone    Derrick Guidry (Son) [phone] -- [phone]    Josse Guidry (Son) [phone] -- [phone]               History & Physical      Tang Beal MD at 05/13/23 0842          [facility] Admission H&P    Patient Care Team:  Napoleon Mead MD as PCP - General  Napoleon Mead MD as PCP - Family Medicine    Chief complaint: " Left great toe pain    History of Present Illness    This is an 82-year-old female with history of diabetes, hypertension, neuropathy, urinary tract infections amongst numerous other issues as outlined below who presented to the emergency room with complaints of pain under her left great toe.  This has been going on for a few days.  She denies any swelling or redness around the toe.  No drainage.  She is not sure how long the present bunion has been there.  She has noticed some increased swelling and sensitivity of her bilateral lower extremities between the knees and ankles.  She denies any fevers or chills.  Her family brought her in as there was concern for possible developing infection particularly in the setting of her diabetes.  Patient states she has also been battling a urinary tract infection for the last month with ongoing dysuria despite 2 rounds of antibiotics in the outpatient setting.  She does have lower abdominal/suprapubic discomfort.  No nausea or vomiting.  No chest pain or shortness of breath.    Past Medical History:   Diagnosis Date   • Acute metabolic encephalopathy 6/24/2022   • Anxiety    • Arthritis    • Benign essential hypertension 08/20/2014   • Bleeding disorder    • Depression    • Diabetes    • Diabetes mellitus, type 2    • Disc degeneration, lumbar    • Headache, tension-type    • Hyperlipidemia    • Hypothyroidism    • Neuropathy    • Osteoporosis 09/09/2015   • Peripheral neuropathy    • Rotator cuff tear, left    • Scoliosis    • Shoulder pain     LEFT, TORN ROTATOR CUFF S/P FALL   • Spinal stenosis      Past Surgical History:   Procedure Laterality Date   • APPENDECTOMY     • BILATERAL BREAST REDUCTION Bilateral 08/2015   • CATARACT EXTRACTION  03/2015   • CHOLECYSTECTOMY WITH INTRAOPERATIVE CHOLANGIOGRAM N/A 3/27/2022    Procedure: CHOLECYSTECTOMY LAPAROSCOPIC INTRAOPERATIVE CHOLANGIOGRAM;  Surgeon: Aiyana Hill MD;  Location: [facility];  Service: General;  Laterality:  N/A;   • COLONOSCOPY  06/05/2015    WNL   • ERCP N/A 2/25/2022    Procedure: ENDOSCOPIC RETROGRADE CHOLANGIOPANCREATOGRAPHY with sphincterotomy and balloon sweep;  Surgeon: Chilo Wilhelm MD;  Location: [facility] ENDOSCOPY;  Service: Gastroenterology;  Laterality: N/A;  PRE/POST - CBD stones   • ERCP N/A 3/28/2022    Procedure: ENDOSCOPIC RETROGRADE CHOLANGIOPANCREATOGRAPHY WITH SPHINCTEROTOMY AND BALLOON SWEEP;  Surgeon: Chilo Wilhelm MD;  Location: [facility] ENDOSCOPY;  Service: Gastroenterology;  Laterality: N/A;  PRE: COMMON DUCT STONE  POST: COMMON DUCT STONE   • KYPHOPLASTY     • REDUCTION MAMMAPLASTY     • TONSILLECTOMY       Family History   Problem Relation Age of Onset   • Bone cancer Mother    • No Known Problems Father      Social History     Tobacco Use   • Smoking status: Former     Packs/day: 1.00     Types: Cigarettes     Quit date: 2013     Years since quitting: 10.3   • Smokeless tobacco: Never   Vaping Use   • Vaping Use: Never used   Substance Use Topics   • Alcohol use: No   • Drug use: No     (Not in a hospital admission)    Allergies:  Sulfa antibiotics    Review of Systems   Constitutional: Negative for chills and fever.   HENT: Negative for congestion and sore throat.    Eyes: Negative for visual disturbance.   Respiratory: Negative for cough, chest tightness, shortness of breath and wheezing.    Cardiovascular: Negative for chest pain, palpitations and leg swelling.   Gastrointestinal: Negative for abdominal distention, abdominal pain, diarrhea, nausea and vomiting.   Endocrine: Negative for polydipsia and polyuria.   Genitourinary: Positive for dysuria. Negative for difficulty urinating, frequency and urgency.   Musculoskeletal: Positive for arthralgias. Negative for myalgias.        Pain associated with bunion involving the left great toe   Skin: Negative for color change and rash.   Neurological: Negative for dizziness and light-headedness.        PHYSICAL EXAM    Vital  Signs  tMax 24 hrs:  Temp (24hrs), Av.4 °F (36.3 °C), Min:97.2 °F (36.2 °C), Max:97.6 °F (36.4 °C)    Vitals Ranges:  Temp:  [97.2 °F (36.2 °C)-97.6 °F (36.4 °C)] 97.6 °F (36.4 °C)  Heart Rate:  [56-70] 61  Resp:  [16-18] 16  BP: (127-150)/(53-97) 142/63    Physical Exam  Vitals and nursing note reviewed.   Constitutional:       General: She is not in acute distress.  HENT:      Head: Normocephalic and atraumatic.   Eyes:      Extraocular Movements: Extraocular movements intact.      Pupils: Pupils are equal, round, and reactive to light.   Cardiovascular:      Rate and Rhythm: Normal rate and regular rhythm.   Pulmonary:      Effort: Pulmonary effort is normal. No respiratory distress.      Breath sounds: Normal breath sounds.   Abdominal:      General: There is no distension.      Palpations: Abdomen is soft.      Tenderness: There is abdominal tenderness.      Comments: She has mild discomfort upon palpation of the suprapubic region as well as generally across the lower abdomen   Musculoskeletal:         General: No swelling or deformity.      Cervical back: Normal range of motion.      Right lower leg: Edema present.      Left lower leg: Edema present.      Comments: She has a bunion over the left great toe that is sensitive to touch.  No associated erythema or warmth.  No drainage.  Do not appreciate any fluctuance.   Skin:     General: Skin is warm and dry.      Comments: She has some mild erythema to the bilateral lower extremities between the ankles and knees consistent with venous stasis   Neurological:      General: No focal deficit present.      Mental Status: She is alert and oriented to person, place, and time.         Results Review:  Results from last 7 days   Lab Units 23  0621   WBC 10*3/mm3 8.78   HEMOGLOBIN g/dL 11.8*   HEMATOCRIT % 37.1   PLATELETS 10*3/mm3 299     Results from last 7 days   Lab Units 23  0621 23  0213   SODIUM mmol/L 135* 133*   POTASSIUM mmol/L 3.9 4.5    CHLORIDE mmol/L 101 99   CO2 mmol/L 23.0 21.6*   BUN mg/dL 39* 39*   CREATININE mg/dL 1.52* 1.79*   CALCIUM mg/dL 8.7 9.4   BILIRUBIN mg/dL  --  0.2   ALK PHOS U/L  --  117   ALT (SGPT) U/L  --  8   AST (SGOT) U/L  --  6   GLUCOSE mg/dL 270* 415*        I reviewed the patient's new clinical results.  I reviewed the patient's new imaging results and agree with the interpretation.        Active Hospital Problems    Diagnosis  POA   • Acute pain of left foot [M79.672]  Unknown   • Acute cystitis without hematuria [N30.00]  Yes   • History of Clostridium difficile infection [Z86.19]  Yes   • VIPUL (acute kidney injury) [N17.9]  Unknown   • 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       Assessment & Plan    The patient has been admitted.  She has a couple of issues going on including the pain over her left great toe bunion, possible urinary tract infection with associated lower abdominal discomfort, uncontrolled diabetes, and acute kidney injury on chronic kidney disease.  X-ray of the left foot did not reveal any abnormality.  I would like to get her renal function a little more improved and will likely check an MRI tomorrow to rule out any deeper issues.  Continue Rocephin for now while awaiting urine culture results.  Depending on her abdominal discomfort over the course of the day today I might consider a abdominal CT as well but her exam is benign at present with only some mild discomfort upon palpation.  Supportive care with IV fluids and analgesics.  Will avoid any nephrotoxic medications.  Restart her home antihypertensive regimen as her renal function allows.  We will get her back on home dose of insulin to start and see where things trend there.  Blood sugars 415 in the emergency room  and these are trending down.  Additional plans based on her clinical course.    I discussed the patients findings and my recommendations with patient      Tang Beal MD  05/13/23  08:43 EDT              Electronically signed by Tang Beal MD at 05/13/23 0848         Facility-Administered Medications as of 5/12/2023   Medication Dose Route Frequency Provider Last Rate Last Admin   • acetaminophen (TYLENOL) tablet 650 mg  650 mg Oral Q4H PRN Beena Morgan APRN        Or   • acetaminophen (TYLENOL) 160 MG/5ML solution 650 mg  650 mg Oral Q4H PRN Beena Morgan APRN        Or   • acetaminophen (TYLENOL) suppository 650 mg  650 mg Rectal Q4H PRN Beena Morgan APRN       • calcium carbonate (TUMS) chewable tablet 500 mg (200 mg elemental)  2 tablet Oral BID PRN Beena Morgan APRN       • [COMPLETED] cefTRIAXone (ROCEPHIN) 1 g in sodium chloride 0.9 % 100 mL IVPB-VTB  1 g Intravenous Once Bertram Clark MD   Stopped at 05/13/23 0300   • [START ON 5/14/2023] cefTRIAXone (ROCEPHIN) 1 g in sodium chloride 0.9 % 100 mL IVPB-VTB  1 g Intravenous Q24H Beena Morgan APRN       • dextrose (D50W) (25 g/50 mL) IV injection 25 g  25 g Intravenous Q15 Min PRN Beena Morgan APRN       • dextrose (GLUTOSE) oral gel 15 g  15 g Oral Q15 Min PRN Beena Morgan APRN       • glucagon (GLUCAGEN) injection 1 mg  1 mg Intramuscular Q15 Min PRN Beena Morgan APRN       • insulin lispro (HUMALOG/ADMELOG) injection 2-9 Units  2-9 Units Subcutaneous TID With Meals Beena Morgan APRN   4 Units at 05/13/23 0832   • [COMPLETED] insulin regular (humuLIN R,novoLIN R) injection 5 Units  5 Units Intravenous Once Bertram Clark MD   5 Units at 05/13/23 0405   • [COMPLETED] insulin regular (humuLIN R,novoLIN R) injection 5 Units  5 Units Intravenous Once Bertram Clark MD   5 Units at 05/13/23 0622   • nitroglycerin (NITROSTAT) SL  tablet 0.4 mg  0.4 mg Sublingual Q5 Min PRN Beena Morgan APRN       • ondansetron (ZOFRAN) tablet 4 mg  4 mg Oral Q6H PRN Beena Morgan APRN        Or   • ondansetron (ZOFRAN) injection 4 mg  4 mg Intravenous Q6H PRN Beena Morgan APRN       • sodium chloride 0.9 % flush 10 mL  10 mL Intravenous PRN Bertram Clark MD       • sodium chloride 0.9 % flush 10 mL  10 mL Intravenous Q12H Beena Morgan APRN   10 mL at 05/13/23 0834   • sodium chloride 0.9 % flush 10 mL  10 mL Intravenous PRN Beena Morgan APRN       • sodium chloride 0.9 % infusion 40 mL  40 mL Intravenous PRN Beena Morgan APRN       • sodium chloride 0.9 % infusion  100 mL/hr Intravenous Continuous Beena Morgan APRN 100 mL/hr at 05/13/23 0823 100 mL/hr at 05/13/23 0823       Lab Results (last 24 hours)     Procedure Component Value Units Date/Time    POC Glucose Once [[id number]]  (Abnormal) Collected: 05/13/23 0710    Specimen: Blood Updated: 05/13/23 0712     Glucose 201 mg/dL      Comment: Meter: [id number] : [id number] Val Calhoun RN       Basic Metabolic Panel [[id number]]  (Abnormal) Collected: 05/13/23 0621    Specimen: Blood Updated: 05/13/23 0706     Glucose 270 mg/dL      BUN 39 mg/dL      Creatinine 1.52 mg/dL      Sodium 135 mmol/L      Potassium 3.9 mmol/L      Chloride 101 mmol/L      CO2 23.0 mmol/L      Calcium 8.7 mg/dL      BUN/Creatinine Ratio 25.7     Anion Gap 11.0 mmol/L      eGFR 34.1 mL/min/1.73     Narrative:      GFR Normal >60  Chronic Kidney Disease <60  Kidney Failure <15    The GFR formula is only valid for adults with stable renal function between ages 18 and 70.    High Sensitivity Troponin T 2Hr [[id number]]  (Abnormal) Collected: 05/13/23 0621    Specimen: Blood Updated: 05/13/23 0706     HS Troponin T 31 ng/L      Troponin T Delta -2 ng/L     Narrative:      High Sensitive Troponin T Reference Range:  <10.0 ng/L- Negative Female for AMI  <15.0 ng/L-  Negative Male for AMI  >=10 - Abnormal Female indicating possible myocardial injury.  >=15 - Abnormal Male indicating possible myocardial injury.   Clinicians would have to utilize clinical acumen, EKG, Troponin, and serial changes to determine if it is an Acute Myocardial Infarction or myocardial injury due to an underlying chronic condition.         STAT Lactic Acid, Reflex [[id number]]  (Normal) Collected: 05/13/23 0621    Specimen: Blood Updated: 05/13/23 0705     Lactate 1.6 mmol/L     CBC (No Diff) [[id number]]  (Abnormal) Collected: 05/13/23 0621    Specimen: Blood Updated: 05/13/23 0656     WBC 8.78 10*3/mm3      RBC 4.31 10*6/mm3      Hemoglobin 11.8 g/dL      Hematocrit 37.1 %      MCV 86.1 fL      MCH 27.4 pg      MCHC 31.8 g/dL      RDW 13.5 %      RDW-SD 42.6 fl      MPV 9.6 fL      Platelets 299 10*3/mm3     Urine Culture - Urine, Urine, Clean Catch [[id number]] Collected: 05/13/23 0622    Specimen: Urine, Clean Catch Updated: 05/13/23 0639    Sedimentation Rate [[id number]]  (Normal) Collected: 05/13/23 0213    Specimen: Blood from Arm, Right Updated: 05/13/23 0523     Sed Rate 23 mm/hr     C-reactive Protein [[id number]]  (Normal) Collected: 05/13/23 0213    Specimen: Blood from Arm, Right Updated: 05/13/23 0446     C-Reactive Protein 0.36 mg/dL     POC Glucose Once [[id number]]  (Abnormal) Collected: 05/13/23 0434    Specimen: Blood Updated: 05/13/23 0435     Glucose 326 mg/dL      Comment: Meter: [id number] : [id number] Josh Burkett CNA       Comprehensive Metabolic Panel [[id number]]  (Abnormal) Collected: 05/13/23 0213    Specimen: Blood from Arm, Right Updated: 05/13/23 0313     Glucose 415 mg/dL      BUN 39 mg/dL      Creatinine 1.79 mg/dL      Sodium 133 mmol/L      Potassium 4.5 mmol/L      Chloride 99 mmol/L      CO2 21.6 mmol/L      Calcium 9.4 mg/dL      Total Protein 7.1 g/dL      Albumin 3.8 g/dL      ALT (SGPT) 8 U/L      AST (SGOT) 6 U/L      Alkaline Phosphatase 117 U/L      Total  Bilirubin 0.2 mg/dL      Globulin 3.3 gm/dL      A/G Ratio 1.2 g/dL      BUN/Creatinine Ratio 21.8     Anion Gap 12.4 mmol/L      eGFR 28.0 mL/min/1.73     Narrative:      GFR Normal >60  Chronic Kidney Disease <60  Kidney Failure <15    The GFR formula is only valid for adults with stable renal function between ages 18 and 70.    Respiratory Panel PCR w/COVID-19(SARS-CoV-2) NAY/JEANIE/GAYLE/PAD/COR/MAD/YANN In-House, NP Swab in UTM/VTM, 3-4 HR TAT - Swab, Nasopharynx [[id number]]  (Normal) Collected: 05/13/23 0143    Specimen: Swab from Nasopharynx Updated: 05/13/23 0310     ADENOVIRUS, PCR Not Detected     Coronavirus 229E Not Detected     Coronavirus HKU1 Not Detected     Coronavirus NL63 Not Detected     Coronavirus OC43 Not Detected     COVID19 Not Detected     Human Metapneumovirus Not Detected     Human Rhinovirus/Enterovirus Not Detected     Influenza A PCR Not Detected     Influenza B PCR Not Detected     Parainfluenza Virus 1 Not Detected     Parainfluenza Virus 2 Not Detected     Parainfluenza Virus 3 Not Detected     Parainfluenza Virus 4 Not Detected     RSV, PCR Not Detected     Bordetella pertussis pcr Not Detected     Bordetella parapertussis PCR Not Detected     Chlamydophila pneumoniae PCR Not Detected     Mycoplasma pneumo by PCR Not Detected    Narrative:      In the setting of a positive respiratory panel with a viral infection PLUS a negative procalcitonin without other underlying concern for bacterial infection, consider observing off antibiotics or discontinuation of antibiotics and continue supportive care. If the respiratory panel is positive for atypical bacterial infection (Bordetella pertussis, Chlamydophila pneumoniae, or Mycoplasma pneumoniae), consider antibiotic de-escalation to target atypical bacterial infection.    High Sensitivity Troponin T [[id number]]  (Abnormal) Collected: 05/13/23 0213    Specimen: Blood from Arm, Right Updated: 05/13/23 0256     HS Troponin T 33 ng/L     Narrative:  "     High Sensitive Troponin T Reference Range:  <10.0 ng/L- Negative Female for AMI  <15.0 ng/L- Negative Male for AMI  >=10 - Abnormal Female indicating possible myocardial injury.  >=15 - Abnormal Male indicating possible myocardial injury.   Clinicians would have to utilize clinical acumen, EKG, Troponin, and serial changes to determine if it is an Acute Myocardial Infarction or myocardial injury due to an underlying chronic condition.         Procalcitonin [[id number]]  (Normal) Collected: 05/13/23 0213    Specimen: Blood from Arm, Right Updated: 05/13/23 0256     Procalcitonin 0.08 ng/mL     Narrative:      As a Marker for Sepsis (Non-Neonates):    1. <0.5 ng/mL represents a low risk of severe sepsis and/or septic shock.  2. >2 ng/mL represents a high risk of severe sepsis and/or septic shock.    As a Marker for Lower Respiratory Tract Infections that require antibiotic therapy:    PCT on Admission    Antibiotic Therapy       6-12 Hrs later    >0.5                Strongly Recommended  >0.25 - <0.5        Recommended   0.1 - 0.25          Discouraged              Remeasure/reassess PCT  <0.1                Strongly Discouraged     Remeasure/reassess PCT    As 28 day mortality risk marker: \"Change in Procalcitonin Result\" (>80% or <=80%) if Day 0 (or Day 1) and Day 4 values are available. Refer to http://[email or web address]-pct-calculator.com    Change in PCT <=80%  A decrease of PCT levels below or equal to 80% defines a positive change in PCT test result representing a higher risk for 28-day all-cause mortality of patients diagnosed with severe sepsis for septic shock.    Change in PCT >80%  A decrease of PCT levels of more than 80% defines a negative change in PCT result representing a lower risk for 28-day all-cause mortality of patients diagnosed with severe sepsis or septic shock.       BNP [[id number]]  (Normal) Collected: 05/13/23 0213    Specimen: Blood from Arm, Right Updated: 05/13/23 0256     proBNP 217.0 pg/mL "     Narrative:      Among patients with dyspnea, NT-proBNP is highly sensitive for the detection of acute congestive heart failure. In addition NT-proBNP of <300 pg/ml effectively rules out acute congestive heart failure with 99% negative predictive value.    Results may be falsely decreased if patient taking Biotin.      Magnesium [[id number]]  (Normal) Collected: 05/13/23 0213    Specimen: Blood from Arm, Right Updated: 05/13/23 0256     Magnesium 1.9 mg/dL     Lactic Acid, Plasma [[id number]]  (Abnormal) Collected: 05/13/23 0213    Specimen: Blood from Arm, Right Updated: 05/13/23 0256     Lactate 2.7 mmol/L     Protime-INR [[id number]]  (Normal) Collected: 05/13/23 0213    Specimen: Blood from Arm, Right Updated: 05/13/23 0245     Protime 13.6 Seconds      INR 1.03    aPTT [[id number]]  (Normal) Collected: 05/13/23 0213    Specimen: Blood from Arm, Right Updated: 05/13/23 0245     PTT 30.6 seconds     CBC & Differential [[id number]]  (Normal) Collected: 05/13/23 0213    Specimen: Blood from Arm, Right Updated: 05/13/23 0229    Narrative:      The following orders were created for panel order CBC & Differential.  Procedure                               Abnormality         Status                     ---------                               -----------         ------                     CBC Auto Differential[[id number]]        Normal              Final result                 Please view results for these tests on the individual orders.    CBC Auto Differential [[id number]]  (Normal) Collected: 05/13/23 0213    Specimen: Blood from Arm, Right Updated: 05/13/23 0229     WBC 9.05 10*3/mm3      RBC 4.28 10*6/mm3      Hemoglobin 12.3 g/dL      Hematocrit 36.3 %      MCV 84.8 fL      MCH 28.7 pg      MCHC 33.9 g/dL      RDW 13.4 %      RDW-SD 40.6 fl      MPV 9.6 fL      Platelets 347 10*3/mm3      Neutrophil % 64.9 %      Lymphocyte % 21.5 %      Monocyte % 8.3 %      Eosinophil % 3.8 %      Basophil % 1.1 %      Immature Grans  % 0.4 %      Neutrophils, Absolute 5.87 10*3/mm3      Lymphocytes, Absolute 1.95 10*3/mm3      Monocytes, Absolute 0.75 10*3/mm3      Eosinophils, Absolute 0.34 10*3/mm3      Basophils, Absolute 0.10 10*3/mm3      Immature Grans, Absolute 0.04 10*3/mm3      nRBC 0.0 /100 WBC     Blood Culture - Blood, Arm, Left [[id number]] Collected: 05/13/23 0214    Specimen: Blood from Arm, Left Updated: 05/13/23 0228    Blood Culture - Blood, Arm, Right [[id number]] Collected: 05/13/23 0213    Specimen: Blood from Arm, Right Updated: 05/13/23 0227    Urinalysis, Microscopic Only - Straight Cath [[id number]]  (Abnormal) Collected: 05/13/23 0143    Specimen: Urine from Straight Cath Updated: 05/13/23 0215     RBC, UA       Unable to determine due to loaded field     /HPF     WBC, UA Too Numerous to Count /HPF      Bacteria, UA       Unable to determine due to loaded field     /HPF     Squamous Epithelial Cells, UA       Unable to determine due to loaded field     /HPF     Hyaline Casts, UA       Unable to determine due to loaded field     /LPF     Methodology Manual Light Microscopy    Urinalysis With Microscopic If Indicated (No Culture) - Straight Cath [[id number]]  (Abnormal) Collected: 05/13/23 0143    Specimen: Urine from Straight Cath Updated: 05/13/23 0201     Color, UA Dark Yellow     Appearance, UA Turbid     pH, UA 5.5     Specific Gravity, UA 1.021     Glucose,  mg/dL (1+)     Ketones, UA Trace     Bilirubin, UA Negative     Blood, UA Moderate (2+)     Protein, UA >=300 mg/dL (3+)     Leuk Esterase, UA Large (3+)     Nitrite, UA Negative     Urobilinogen, UA 1.0 E.U./dL        Imaging Results (Last 24 Hours)     Procedure Component Value Units Date/Time    XR Chest 1 View [[id number]] Collected: 05/13/23 0115     Updated: 05/13/23 0118    Narrative:      SINGLE VIEW OF THE CHEST     HISTORY: Shortness of air     COMPARISON: 11/03/2022     FINDINGS:  Heart size is within normal limits. There is some calcification of  the  aorta. No pneumothorax, pleural effusion, or acute infiltrate is seen.       Impression:      No acute findings.     This report was finalized on 5/13/2023 1:15 AM by Dr. Kristina Forman M.D.       XR Foot 3+ View Left [[id number]] Collected: 05/13/23 0113     Updated: 05/13/23 0118    Narrative:      3 VIEWS LEFT FOOT     HISTORY: Diabetic foot ulcer     COMPARISON: None available.     FINDINGS:  No definite acute fracture or subluxation of left foot is seen, although  images are degraded by the patient's osteoporosis. There is some left  lower extremity edema. No definite aggressive osseous abnormalities are  seen.       Impression:      No acute osseous findings, although the exam is degraded by the  patient's osteoporosis     This report was finalized on 5/13/2023 1:14 AM by Dr. Kristina Forman M.D.           ECG/EMG Results (last 24 hours)     Procedure Component Value Units Date/Time    ECG 12 Lead Dyspnea [[id number]] Collected: 05/13/23 0045     Updated: 05/13/23 0047     QT Interval 482 ms     Narrative:      HEART RATE= 60  bpm  RR Interval= 1000  ms  WI Interval= 191  ms  P Horizontal Axis= -16  deg  P Front Axis= 55  deg  QRSD Interval= 147  ms  QT Interval= 482  ms  QRS Axis= 50  deg  T Wave Axis= 15  deg  - ABNORMAL ECG -  Sinus rhythm  Right bundle branch block  Electronically Signed By:   Date and Time of Study: 2023-05-13 00:45:06          Orders (last 24 hrs)      Start     Ordered    05/14/23 0200  cefTRIAXone (ROCEPHIN) 1 g in sodium chloride 0.9 % 100 mL IVPB-VTB  Every 24 Hours         05/13/23 0449 05/13/23 1008  Discontinue Patient Isolation  Once         05/13/23 1007    05/13/23 0900  sodium chloride 0.9 % flush 10 mL  Every 12 Hours Scheduled         05/13/23 0449 05/13/23 0800  Vital Signs  Every 4 Hours       05/13/23 0449 05/13/23 0800  Oral Care  2 Times Daily       05/13/23 0449 05/13/23 0800  insulin lispro (HUMALOG/ADMELOG) injection 2-9 Units  3 Times Daily  With Meals         05/13/23 0450    05/13/23 0713  POC Glucose Once  PROCEDURE ONCE         05/13/23 0710    05/13/23 0700  POC Glucose TID AC  3 Times Daily Before Meals       05/13/23 0450    05/13/23 0600  Basic Metabolic Panel  Morning Draw         05/13/23 0449    05/13/23 0600  CBC (No Diff)  Morning Draw         05/13/23 0449    05/13/23 0513  STAT Lactic Acid, Reflex  PROCEDURE ONCE         05/13/23 0256    05/13/23 0451  sodium chloride 0.9 % infusion  Continuous         05/13/23 0449 05/13/23 0450  Inpatient Admission  Once         05/13/23 0449    05/13/23 0449  dextrose (GLUTOSE) oral gel 15 g  Every 15 Minutes PRN         05/13/23 0450    05/13/23 0449  dextrose (D50W) (25 g/50 mL) IV injection 25 g  Every 15 Minutes PRN         05/13/23 0450    05/13/23 0449  glucagon (GLUCAGEN) injection 1 mg  Every 15 Minutes PRN         05/13/23 0450    05/13/23 0448  calcium carbonate (TUMS) chewable tablet 500 mg (200 mg elemental)  2 Times Daily PRN         05/13/23 0449    05/13/23 0448  ondansetron (ZOFRAN) tablet 4 mg  Every 6 Hours PRN        See Hyperspace for full Linked Orders Report.    05/13/23 0449 05/13/23 0448  ondansetron (ZOFRAN) injection 4 mg  Every 6 Hours PRN        See Hyperspace for full Linked Orders Report.    05/13/23 0449    05/13/23 0448  acetaminophen (TYLENOL) tablet 650 mg  Every 4 Hours PRN        See Hyperspace for full Linked Orders Report.    05/13/23 0449    05/13/23 0448  acetaminophen (TYLENOL) 160 MG/5ML solution 650 mg  Every 4 Hours PRN        See Hyperspace for full Linked Orders Report.    05/13/23 0449    05/13/23 0448  acetaminophen (TYLENOL) suppository 650 mg  Every 4 Hours PRN        See Hyperspace for full Linked Orders Report.    05/13/23 0449    05/13/23 0448  Intake & Output  Every Shift       05/13/23 0449    05/13/23 0448  Weigh Patient  Once         05/13/23 0449    05/13/23 0448  Oxygen Therapy- Nasal Cannula; Titrate for SPO2: 90% - 95%  Continuous          05/13/23 0449    05/13/23 0448  Insert Peripheral IV  Once         05/13/23 0449    05/13/23 0448  Saline Lock & Maintain IV Access  Continuous,   Status:  Canceled         05/13/23 0449 05/13/23 0448  Code Status and Medical Interventions:  Continuous         05/13/23 0449    05/13/23 0448  Place Sequential Compression Device  Once         05/13/23 0449    05/13/23 0448  Maintain Sequential Compression Device  Continuous         05/13/23 0449    05/13/23 0448  Telemetry - Maintain IV Access  Continuous         05/13/23 0449    05/13/23 0448  Continuous Cardiac Monitoring  Continuous        Comments: Follow Standing Orders As Outlined in Process Instructions (Open Order Report to View Full Instructions)    05/13/23 0449 05/13/23 0448  May Be Off Telemetry for Tests  Continuous         05/13/23 0449 05/13/23 0448  Diet: Diabetic Diets; Consistent Carbohydrate; Texture: Regular Texture (IDDSI 7); Fluid Consistency: Thin (IDDSI 0)  Diet Effective Now         05/13/23 0449 05/13/23 0447  nitroglycerin (NITROSTAT) SL tablet 0.4 mg  Every 5 Minutes PRN         05/13/23 0449    05/13/23 0447  sodium chloride 0.9 % flush 10 mL  As Needed         05/13/23 0449    05/13/23 0447  sodium chloride 0.9 % infusion 40 mL  As Needed         05/13/23 0449    05/13/23 0437  insulin regular (humuLIN R,novoLIN R) injection 5 Units  Once         05/13/23 0435    05/13/23 0436  POC Glucose Once  PROCEDURE ONCE         05/13/23 0434    05/13/23 0433  POC Glucose Once  Once         05/13/23 0432    05/13/23 0422  Sedimentation Rate  Once         05/13/23 0421    05/13/23 0422  C-reactive Protein  Once         05/13/23 0421    05/13/23 0413  High Sensitivity Troponin T 2Hr  PROCEDURE ONCE         05/13/23 0256    05/13/23 0331  insulin regular (humuLIN R,novoLIN R) injection 5 Units  Once         05/13/23 0329    05/13/23 0206  cefTRIAXone (ROCEPHIN) 1 g in sodium chloride 0.9 % 100 mL IVPB-VTB  Once         05/13/23 0204     05/13/23 0205  Urine Culture - Urine, Urine, Clean Catch  Once         05/13/23 0204    05/13/23 0159  Urinalysis, Microscopic Only - Straight Cath  Once         05/13/23 0158    05/13/23 0037  Respiratory Panel PCR w/COVID-19(SARS-CoV-2) NAY/JEANIE/GAYLE/PAD/COR/MAD/YANN In-House, NP Swab in UTM/VTM, 3-4 HR TAT - Swab, Nasopharynx  STAT         05/13/23 0036    05/13/23 0034  Magnesium  Once         05/13/23 0033    05/13/23 0034  ECG 12 Lead Dyspnea  Once         05/13/23 0033    05/13/23 0034  XR Chest 1 View  1 Time Imaging         05/13/23 0033    05/13/23 0034  XR Foot 3+ View Left  1 Time Imaging         05/13/23 0033    05/13/23 0034  Monitor Blood Pressure  Per Hospital Policy         05/13/23 0033    05/13/23 0034  Cardiac Monitoring  Continuous,   Status:  Canceled        Comments: Follow Standing Orders As Outlined in Process Instructions (Open Order Report to View Full Instructions)    05/13/23 0033    05/13/23 0034  Pulse Oximetry, Continuous  Continuous         05/13/23 0033    05/13/23 0034  Insert Peripheral IV  Once        See Hyperspace for full Linked Orders Report.    05/13/23 0033    05/13/23 0034  Please undress pt and place in gown to facilitate inspection of reported bed sores  Misc Nursing Order (Specify)  Once        Comments: Please undress pt and place in gown to facilitate inspection of reported bed sores    05/13/23 0034    05/13/23 0033  sodium chloride 0.9 % flush 10 mL  As Needed        See Hyperspace for full Linked Orders Report.    05/13/23 0033    05/13/23 0033  CBC & Differential  Once         05/13/23 0033    05/13/23 0033  Comprehensive Metabolic Panel  Once         05/13/23 0033    05/13/23 0033  Protime-INR  Once         05/13/23 0033    05/13/23 0033  aPTT  Once         05/13/23 0033    05/13/23 0033  Urinalysis With Microscopic If Indicated (No Culture) - Straight Cath  Once         05/13/23 0033    05/13/23 0033  BNP  Once         05/13/23 0033    05/13/23 0033  High  Sensitivity Troponin T  Once         05/13/23 0033    05/13/23 0033  Blood Culture - Blood, Arm, Right  Once         05/13/23 0033    05/13/23 0033  Blood Culture - Blood, Arm, Left  Once         05/13/23 0033    05/13/23 0033  Lactic Acid, Plasma  Once         05/13/23 0033    05/13/23 0033  Procalcitonin  Once         05/13/23 0033    05/13/23 0033  CBC Auto Differential  PROCEDURE ONCE         05/13/23 0034    Unscheduled  Telemetry - Pulse Oximetry  Continuous PRN      Comments: If Patient Develops Unresponsiveness, Acute Dyspnea, Cyanosis or Suspected Hypoxemia Start Continuous Pulse Ox Monitoring, Apply Oxygen & Notify Provider    05/13/23 0449    Unscheduled  Oxygen Therapy- Nasal Cannula; Titrate for SPO2: 90% - 95%  Continuous PRN      Comments: If Patient Develops Unresponsiveness, Acute Dyspnea, Cyanosis or Suspected Hypoxemia Start Continuous Pulse Ox Monitoring, Apply Oxygen & Notify Provider    05/13/23 0449    Unscheduled  ECG 12 Lead Chest Pain  As Needed      Comments: Nurse to Release if Patient Expericences Acute Chest Pain or Dysrhythmias    05/13/23 0449    Unscheduled  Potassium  As Needed      Comments: For Ventricular Arrhythmias      05/13/23 0449    Unscheduled  Magnesium  As Needed      Comments: For Ventricular Arrhythmias      05/13/23 0449    Unscheduled  High Sensitivity Troponin T  As Needed      Comments: For Chest Pain      05/13/23 0449    Unscheduled  Blood Gas, Arterial -  As Needed      Comments: Draw for Acute Dyspnea, Cyanosis, Suspected Hypoxemia or UnresponsivenessNotify Provider of Results      05/13/23 0449    Unscheduled  Follow Hypoglycemia Standing Orders For Blood Glucose <70 & Notify Provider of Treatment  As Needed      Comments: Follow Hypoglycemia Orders As Outlined in Process Instructions (Open Order Report to View Full Instructions)  Notify Provider Any Time Hypoglycemia Treatment is Administered    05/13/23 0450    Signed and Held  amLODIPine (NORVASC) tablet 5  mg  Daily         Signed and Held    Signed and Held  atorvastatin (LIPITOR) tablet 80 mg  Nightly         Signed and Held    Signed and Held  bisoprolol (ZEBeta) tablet 5 mg  Daily         Signed and Held    Signed and Held  DULoxetine (CYMBALTA) DR capsule 30 mg  Daily         Signed and Held    Signed and Held  HYDROcodone-acetaminophen (NORCO) 5-325 MG per tablet 1 tablet  Every 6 Hours PRN         Signed and Held    Signed and Held  insulin glargine (LANTUS, SEMGLEE) injection 40 Units  Nightly         Signed and Held    Signed and Held  insulin lispro (HUMALOG/ADMELOG) injection 8 Units  3 Times Daily With Meals         Signed and Held    Signed and Held  pantoprazole (PROTONIX) EC tablet 40 mg  Every Early Morning         Signed and Held    Signed and Held  levothyroxine (SYNTHROID, LEVOTHROID) tablet 112 mcg  Daily         Signed and Held    Signed and Held  melatonin tablet 3 mg  Nightly         Signed and Held    Signed and Held  vitamin B-12 (CYANOCOBALAMIN) tablet 1,000 mcg  Daily         Signed and Held    Signed and Held  pregabalin (LYRICA) capsule 50 mg  3 Times Daily         Signed and Held                Operative/Procedure Notes (last 24 hours)  Notes from 05/12/23 1208 through 05/13/23 1208   No notes of this type exist for this encounter.         Physician Progress Notes (last 24 hours)  Notes from 05/12/23 1208 through 05/13/23 1208   No notes of this type exist for this encounter.         Consult Notes (last 24 hours)  Notes from 05/12/23 1208 through 05/13/23 1208   No notes of this type exist for this encounter.

## 2023-05-13 NOTE — ED TRIAGE NOTES
"Pt arrives in wheelchair to ED via PV from home with c/o an \"infected bunion.\"  Pt family talked over the pt stating it is a \"diabetic ulcer.\"  Pt family continues to notify this RN pt has also had a UTI for several weeks, has diarrhea, and bedsores from not getting up.  "

## 2023-05-13 NOTE — H&P
[facility] Admission H&P    Patient Care Team:  Napoleon Mead MD as PCP - General  Napoleon Mead MD as PCP - Family Medicine    Chief complaint: Left great toe pain    History of Present Illness    This is an 82-year-old female with history of diabetes, hypertension, neuropathy, urinary tract infections amongst numerous other issues as outlined below who presented to the emergency room with complaints of pain under her left great toe.  This has been going on for a few days.  She denies any swelling or redness around the toe.  No drainage.  She is not sure how long the present bunion has been there.  She has noticed some increased swelling and sensitivity of her bilateral lower extremities between the knees and ankles.  She denies any fevers or chills.  Her family brought her in as there was concern for possible developing infection particularly in the setting of her diabetes.  Patient states she has also been battling a urinary tract infection for the last month with ongoing dysuria despite 2 rounds of antibiotics in the outpatient setting.  She does have lower abdominal/suprapubic discomfort.  No nausea or vomiting.  No chest pain or shortness of breath.    Past Medical History:   Diagnosis Date   • Acute metabolic encephalopathy 6/24/2022   • Anxiety    • Arthritis    • Benign essential hypertension 08/20/2014   • Bleeding disorder    • Depression    • Diabetes    • Diabetes mellitus, type 2    • Disc degeneration, lumbar    • Headache, tension-type    • Hyperlipidemia    • Hypothyroidism    • Neuropathy    • Osteoporosis 09/09/2015   • Peripheral neuropathy    • Rotator cuff tear, left    • Scoliosis    • Shoulder pain     LEFT, TORN ROTATOR CUFF S/P FALL   • Spinal stenosis      Past Surgical History:   Procedure Laterality Date   • APPENDECTOMY     • BILATERAL BREAST REDUCTION Bilateral 08/2015   • CATARACT EXTRACTION  03/2015   • CHOLECYSTECTOMY WITH INTRAOPERATIVE CHOLANGIOGRAM N/A 3/27/2022    Procedure:  CHOLECYSTECTOMY LAPAROSCOPIC INTRAOPERATIVE CHOLANGIOGRAM;  Surgeon: Aiyana Hill MD;  Location: [facility] MAIN OR;  Service: General;  Laterality: N/A;   • COLONOSCOPY  06/05/2015    WNL   • ERCP N/A 2/25/2022    Procedure: ENDOSCOPIC RETROGRADE CHOLANGIOPANCREATOGRAPHY with sphincterotomy and balloon sweep;  Surgeon: Chilo Wilhelm MD;  Location: [facility] ENDOSCOPY;  Service: Gastroenterology;  Laterality: N/A;  PRE/POST - CBD stones   • ERCP N/A 3/28/2022    Procedure: ENDOSCOPIC RETROGRADE CHOLANGIOPANCREATOGRAPHY WITH SPHINCTEROTOMY AND BALLOON SWEEP;  Surgeon: Chilo Wilhelm MD;  Location: [facility] ENDOSCOPY;  Service: Gastroenterology;  Laterality: N/A;  PRE: COMMON DUCT STONE  POST: COMMON DUCT STONE   • KYPHOPLASTY     • REDUCTION MAMMAPLASTY     • TONSILLECTOMY       Family History   Problem Relation Age of Onset   • Bone cancer Mother    • No Known Problems Father      Social History     Tobacco Use   • Smoking status: Former     Packs/day: 1.00     Types: Cigarettes     Quit date: 2013     Years since quitting: 10.3   • Smokeless tobacco: Never   Vaping Use   • Vaping Use: Never used   Substance Use Topics   • Alcohol use: No   • Drug use: No     (Not in a hospital admission)    Allergies:  Sulfa antibiotics    Review of Systems   Constitutional: Negative for chills and fever.   HENT: Negative for congestion and sore throat.    Eyes: Negative for visual disturbance.   Respiratory: Negative for cough, chest tightness, shortness of breath and wheezing.    Cardiovascular: Negative for chest pain, palpitations and leg swelling.   Gastrointestinal: Negative for abdominal distention, abdominal pain, diarrhea, nausea and vomiting.   Endocrine: Negative for polydipsia and polyuria.   Genitourinary: Positive for dysuria. Negative for difficulty urinating, frequency and urgency.   Musculoskeletal: Positive for arthralgias. Negative for myalgias.        Pain associated with bunion involving the left great  toe   Skin: Negative for color change and rash.   Neurological: Negative for dizziness and light-headedness.        PHYSICAL EXAM    Vital Signs  tMax 24 hrs:  Temp (24hrs), Av.4 °F (36.3 °C), Min:97.2 °F (36.2 °C), Max:97.6 °F (36.4 °C)    Vitals Ranges:  Temp:  [97.2 °F (36.2 °C)-97.6 °F (36.4 °C)] 97.6 °F (36.4 °C)  Heart Rate:  [56-70] 61  Resp:  [16-18] 16  BP: (127-150)/(53-97) 142/63    Physical Exam  Vitals and nursing note reviewed.   Constitutional:       General: She is not in acute distress.  HENT:      Head: Normocephalic and atraumatic.   Eyes:      Extraocular Movements: Extraocular movements intact.      Pupils: Pupils are equal, round, and reactive to light.   Cardiovascular:      Rate and Rhythm: Normal rate and regular rhythm.   Pulmonary:      Effort: Pulmonary effort is normal. No respiratory distress.      Breath sounds: Normal breath sounds.   Abdominal:      General: There is no distension.      Palpations: Abdomen is soft.      Tenderness: There is abdominal tenderness.      Comments: She has mild discomfort upon palpation of the suprapubic region as well as generally across the lower abdomen   Musculoskeletal:         General: No swelling or deformity.      Cervical back: Normal range of motion.      Right lower leg: Edema present.      Left lower leg: Edema present.      Comments: She has a bunion over the left great toe that is sensitive to touch.  No associated erythema or warmth.  No drainage.  Do not appreciate any fluctuance.   Skin:     General: Skin is warm and dry.      Comments: She has some mild erythema to the bilateral lower extremities between the ankles and knees consistent with venous stasis   Neurological:      General: No focal deficit present.      Mental Status: She is alert and oriented to person, place, and time.         Results Review:  Results from last 7 days   Lab Units 23  0621   WBC 10*3/mm3 8.78   HEMOGLOBIN g/dL 11.8*   HEMATOCRIT % 37.1   PLATELETS  10*3/mm3 299     Results from last 7 days   Lab Units 05/13/23  0621 05/13/23  0213   SODIUM mmol/L 135* 133*   POTASSIUM mmol/L 3.9 4.5   CHLORIDE mmol/L 101 99   CO2 mmol/L 23.0 21.6*   BUN mg/dL 39* 39*   CREATININE mg/dL 1.52* 1.79*   CALCIUM mg/dL 8.7 9.4   BILIRUBIN mg/dL  --  0.2   ALK PHOS U/L  --  117   ALT (SGPT) U/L  --  8   AST (SGOT) U/L  --  6   GLUCOSE mg/dL 270* 415*        I reviewed the patient's new clinical results.  I reviewed the patient's new imaging results and agree with the interpretation.        Active Hospital Problems    Diagnosis  POA   • Acute pain of left foot [M79.672]  Unknown   • Acute cystitis without hematuria [N30.00]  Yes   • History of Clostridium difficile infection [Z86.19]  Yes   • VIPUL (acute kidney injury) [N17.9]  Unknown   • 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       Assessment & Plan    The patient has been admitted.  She has a couple of issues going on including the pain over her left great toe bunion, possible urinary tract infection with associated lower abdominal discomfort, uncontrolled diabetes, and acute kidney injury on chronic kidney disease.  X-ray of the left foot did not reveal any abnormality.  I would like to get her renal function a little more improved and will likely check an MRI tomorrow to rule out any deeper issues.  Continue Rocephin for now while awaiting urine culture results.  Depending on her abdominal discomfort over the course of the day today I might consider a abdominal CT as well but her exam is benign at present with only some mild discomfort upon palpation.  Supportive care with IV fluids and analgesics.  Will avoid any nephrotoxic medications.  Restart her home antihypertensive regimen as her renal function  allows.  We will get her back on home dose of insulin to start and see where things trend there.  Blood sugars 415 in the emergency room and these are trending down.  Additional plans based on her clinical course.    I discussed the patients findings and my recommendations with patient      Tang Beal MD  05/13/23  08:43 EDT

## 2023-05-13 NOTE — ED NOTES
Nursing report ED to floor  Halie Guidry  82 y.o.  female    HPI :   Chief Complaint   Patient presents with    Wound Check    Diarrhea       Admitting doctor:   Tang Beal MD    Admitting diagnosis:   The primary encounter diagnosis was Urinary tract infection without hematuria, site unspecified. Diagnoses of Generalized weakness and Diabetic ulcer of left midfoot associated with type 2 diabetes mellitus, unspecified ulcer stage were also pertinent to this visit.    Code status:   Current Code Status       Date Active Code Status Order ID Comments User Context       5/13/2023 0449 CPR (Attempt to Resuscitate) [id number]  Beena Morgan APRN ED        Question Answer    Code Status (Patient has no pulse and is not breathing) CPR (Attempt to Resuscitate)    Medical Interventions (Patient has pulse or is breathing) Full Support                    Allergies:   Sulfa antibiotics    Isolation:   No active isolations    Intake and Output  No intake or output data in the 24 hours ending 05/13/23 1021    Weight:       05/12/23  2206   Weight: 77.1 kg (170 lb)       Most recent vitals:   Vitals:    05/13/23 0810 05/13/23 0811 05/13/23 0820 05/13/23 0941   BP:  142/63  131/52   BP Location:       Patient Position:       Pulse: 56 61  62   Resp:       Temp:   97.6 °F (36.4 °C)    TempSrc:   Oral    SpO2: 95% 91%  95%   Weight:       Height:           Active LDAs/IV Access:   Lines, Drains & Airways       Active LDAs       Name Placement date Placement time Site Days    Peripheral IV 05/13/23 0212 Right;Posterior Forearm 05/13/23 0212  Forearm  less than 1    External Urinary Catheter 11/07/22  2137  --  186                    Labs (abnormal labs have a star):   Labs Reviewed   COMPREHENSIVE METABOLIC PANEL - Abnormal; Notable for the following components:       Result Value    Glucose 415 (*)     BUN 39 (*)     Creatinine 1.79 (*)     Sodium 133 (*)     CO2 21.6 (*)     eGFR 28.0 (*)     All other  components within normal limits    Narrative:     GFR Normal >60  Chronic Kidney Disease <60  Kidney Failure <15    The GFR formula is only valid for adults with stable renal function between ages 18 and 70.   URINALYSIS W/ MICROSCOPIC IF INDICATED (NO CULTURE) - Abnormal; Notable for the following components:    Color, UA Dark Yellow (*)     Appearance, UA Turbid (*)     Glucose,  mg/dL (1+) (*)     Ketones, UA Trace (*)     Blood, UA Moderate (2+) (*)     Protein, UA >=300 mg/dL (3+) (*)     Leuk Esterase, UA Large (3+) (*)     All other components within normal limits   TROPONIN - Abnormal; Notable for the following components:    HS Troponin T 33 (*)     All other components within normal limits    Narrative:     High Sensitive Troponin T Reference Range:  <10.0 ng/L- Negative Female for AMI  <15.0 ng/L- Negative Male for AMI  >=10 - Abnormal Female indicating possible myocardial injury.  >=15 - Abnormal Male indicating possible myocardial injury.   Clinicians would have to utilize clinical acumen, EKG, Troponin, and serial changes to determine if it is an Acute Myocardial Infarction or myocardial injury due to an underlying chronic condition.        LACTIC ACID, PLASMA - Abnormal; Notable for the following components:    Lactate 2.7 (*)     All other components within normal limits   URINALYSIS, MICROSCOPIC ONLY - Abnormal; Notable for the following components:    RBC, UA Unable to determine due to loaded field (*)     WBC, UA Too Numerous to Count (*)     Bacteria, UA Unable to determine due to loaded field (*)     Squamous Epithelial Cells, UA Unable to determine due to loaded field (*)     All other components within normal limits   HIGH SENSITIVITIY TROPONIN T 2HR - Abnormal; Notable for the following components:    HS Troponin T 31 (*)     All other components within normal limits    Narrative:     High Sensitive Troponin T Reference Range:  <10.0 ng/L- Negative Female for AMI  <15.0 ng/L- Negative  Male for AMI  >=10 - Abnormal Female indicating possible myocardial injury.  >=15 - Abnormal Male indicating possible myocardial injury.   Clinicians would have to utilize clinical acumen, EKG, Troponin, and serial changes to determine if it is an Acute Myocardial Infarction or myocardial injury due to an underlying chronic condition.        BASIC METABOLIC PANEL - Abnormal; Notable for the following components:    Glucose 270 (*)     BUN 39 (*)     Creatinine 1.52 (*)     Sodium 135 (*)     BUN/Creatinine Ratio 25.7 (*)     eGFR 34.1 (*)     All other components within normal limits    Narrative:     GFR Normal >60  Chronic Kidney Disease <60  Kidney Failure <15    The GFR formula is only valid for adults with stable renal function between ages 18 and 70.   CBC (NO DIFF) - Abnormal; Notable for the following components:    Hemoglobin 11.8 (*)     All other components within normal limits   POCT GLUCOSE FINGERSTICK - Abnormal; Notable for the following components:    Glucose 326 (*)     All other components within normal limits   POCT GLUCOSE FINGERSTICK - Abnormal; Notable for the following components:    Glucose 201 (*)     All other components within normal limits   RESPIRATORY PANEL PCR W/ COVID-19 (SARS-COV-2) NAY/JEANIE/GAYLE/PAD/COR/MAD/YANN IN-HOUSE, NP SWAB IN UTM/VTP, 3-4 HR TAT - Normal    Narrative:     In the setting of a positive respiratory panel with a viral infection PLUS a negative procalcitonin without other underlying concern for bacterial infection, consider observing off antibiotics or discontinuation of antibiotics and continue supportive care. If the respiratory panel is positive for atypical bacterial infection (Bordetella pertussis, Chlamydophila pneumoniae, or Mycoplasma pneumoniae), consider antibiotic de-escalation to target atypical bacterial infection.   PROTIME-INR - Normal   APTT - Normal   BNP (IN-HOUSE) - Normal    Narrative:     Among patients with dyspnea, NT-proBNP is highly sensitive  "for the detection of acute congestive heart failure. In addition NT-proBNP of <300 pg/ml effectively rules out acute congestive heart failure with 99% negative predictive value.    Results may be falsely decreased if patient taking Biotin.     PROCALCITONIN - Normal    Narrative:     As a Marker for Sepsis (Non-Neonates):    1. <0.5 ng/mL represents a low risk of severe sepsis and/or septic shock.  2. >2 ng/mL represents a high risk of severe sepsis and/or septic shock.    As a Marker for Lower Respiratory Tract Infections that require antibiotic therapy:    PCT on Admission    Antibiotic Therapy       6-12 Hrs later    >0.5                Strongly Recommended  >0.25 - <0.5        Recommended   0.1 - 0.25          Discouraged              Remeasure/reassess PCT  <0.1                Strongly Discouraged     Remeasure/reassess PCT    As 28 day mortality risk marker: \"Change in Procalcitonin Result\" (>80% or <=80%) if Day 0 (or Day 1) and Day 4 values are available. Refer to http://www.[email or web address]-pct-calculator.com    Change in PCT <=80%  A decrease of PCT levels below or equal to 80% defines a positive change in PCT test result representing a higher risk for 28-day all-cause mortality of patients diagnosed with severe sepsis for septic shock.    Change in PCT >80%  A decrease of PCT levels of more than 80% defines a negative change in PCT result representing a lower risk for 28-day all-cause mortality of patients diagnosed with severe sepsis or septic shock.      MAGNESIUM - Normal   CBC WITH AUTO DIFFERENTIAL - Normal   LACTIC ACID, REFLEX - Normal   SEDIMENTATION RATE - Normal   C-REACTIVE PROTEIN - Normal   BLOOD CULTURE   BLOOD CULTURE   URINE CULTURE   POCT GLUCOSE FINGERSTICK   POCT GLUCOSE FINGERSTICK   POCT GLUCOSE FINGERSTICK   POCT GLUCOSE FINGERSTICK   POCT GLUCOSE FINGERSTICK   CBC AND DIFFERENTIAL    Narrative:     The following orders were created for panel order CBC & Differential.  Procedure                "                Abnormality         Status                     ---------                               -----------         ------                     CBC Auto Differential[[id number]]        Normal              Final result                 Please view results for these tests on the individual orders.       EKG:   ECG 12 Lead Dyspnea   Preliminary Result   HEART RATE= 60  bpm   RR Interval= 1000  ms   MN Interval= 191  ms   P Horizontal Axis= -16  deg   P Front Axis= 55  deg   QRSD Interval= 147  ms   QT Interval= 482  ms   QRS Axis= 50  deg   T Wave Axis= 15  deg   - ABNORMAL ECG -   Sinus rhythm   Right bundle branch block   Electronically Signed By:    Date and Time of Study: 2023-05-13 00:45:06          Meds given in ED:   Medications   sodium chloride 0.9 % flush 10 mL (has no administration in time range)   sodium chloride 0.9 % flush 10 mL (10 mL Intravenous Given 5/13/23 0834)   sodium chloride 0.9 % flush 10 mL (has no administration in time range)   sodium chloride 0.9 % infusion 40 mL (has no administration in time range)   nitroglycerin (NITROSTAT) SL tablet 0.4 mg (has no administration in time range)   acetaminophen (TYLENOL) tablet 650 mg (has no administration in time range)     Or   acetaminophen (TYLENOL) 160 MG/5ML solution 650 mg (has no administration in time range)     Or   acetaminophen (TYLENOL) suppository 650 mg (has no administration in time range)   sodium chloride 0.9 % infusion (100 mL/hr Intravenous Currently Infusing 5/13/23 0896)   ondansetron (ZOFRAN) tablet 4 mg (has no administration in time range)     Or   ondansetron (ZOFRAN) injection 4 mg (has no administration in time range)   calcium carbonate (TUMS) chewable tablet 500 mg (200 mg elemental) (has no administration in time range)   cefTRIAXone (ROCEPHIN) 1 g in sodium chloride 0.9 % 100 mL IVPB-VTB (has no administration in time range)   dextrose (GLUTOSE) oral gel 15 g (has no administration in time range)   dextrose (D50W) (25  g/50 mL) IV injection 25 g (has no administration in time range)   glucagon (GLUCAGEN) injection 1 mg (has no administration in time range)   insulin lispro (HUMALOG/ADMELOG) injection 2-9 Units (4 Units Subcutaneous Given 5/13/23 0832)   cefTRIAXone (ROCEPHIN) 1 g in sodium chloride 0.9 % 100 mL IVPB-VTB (0 g Intravenous Stopped 5/13/23 0300)   insulin regular (humuLIN R,novoLIN R) injection 5 Units (5 Units Intravenous Given 5/13/23 0405)   insulin regular (humuLIN R,novoLIN R) injection 5 Units (5 Units Intravenous Given 5/13/23 0622)       Imaging results:  XR Foot 3+ View Left    Result Date: 5/13/2023  No acute osseous findings, although the exam is degraded by the patient's osteoporosis  This report was finalized on 5/13/2023 1:14 AM by Dr. Kristina Forman M.D.      XR Chest 1 View    Result Date: 5/13/2023  No acute findings.  This report was finalized on 5/13/2023 1:15 AM by Dr. Kristina Forman M.D.       Ambulatory status:   - x1    Social issues:   Social History     Socioeconomic History    Marital status:    Tobacco Use    Smoking status: Former     Packs/day: 1.00     Types: Cigarettes     Quit date: 2013     Years since quitting: 10.3    Smokeless tobacco: Never   Vaping Use    Vaping Use: Never used   Substance and Sexual Activity    Alcohol use: No    Drug use: No    Sexual activity: Defer       NIH Stroke Scale:         Amy Swenson RN  05/13/23 10:21 EDT

## 2023-05-14 ENCOUNTER — APPOINTMENT (OUTPATIENT)
Dept: MRI IMAGING | Facility: HOSPITAL | Age: 83
End: 2023-05-14
Payer: MEDICARE

## 2023-05-14 LAB
ANION GAP SERPL CALCULATED.3IONS-SCNC: 15.2 MMOL/L (ref 5–15)
BACTERIA SPEC AEROBE CULT: NO GROWTH
BUN SERPL-MCNC: 30 MG/DL (ref 8–23)
BUN/CREAT SERPL: 25.4 (ref 7–25)
CALCIUM SPEC-SCNC: 8.9 MG/DL (ref 8.6–10.5)
CHLORIDE SERPL-SCNC: 102 MMOL/L (ref 98–107)
CO2 SERPL-SCNC: 19.8 MMOL/L (ref 22–29)
CREAT SERPL-MCNC: 1.18 MG/DL (ref 0.57–1)
DEPRECATED RDW RBC AUTO: 42.1 FL (ref 37–54)
EGFRCR SERPLBLD CKD-EPI 2021: 46.2 ML/MIN/1.73
ERYTHROCYTE [DISTWIDTH] IN BLOOD BY AUTOMATED COUNT: 13.4 % (ref 12.3–15.4)
GLUCOSE BLDC GLUCOMTR-MCNC: 166 MG/DL (ref 70–130)
GLUCOSE BLDC GLUCOMTR-MCNC: 236 MG/DL (ref 70–130)
GLUCOSE BLDC GLUCOMTR-MCNC: 261 MG/DL (ref 70–130)
GLUCOSE BLDC GLUCOMTR-MCNC: 319 MG/DL (ref 70–130)
GLUCOSE SERPL-MCNC: 354 MG/DL (ref 65–99)
HCT VFR BLD AUTO: 34.4 % (ref 34–46.6)
HGB BLD-MCNC: 11.3 G/DL (ref 12–15.9)
MCH RBC QN AUTO: 28.3 PG (ref 26.6–33)
MCHC RBC AUTO-ENTMCNC: 32.8 G/DL (ref 31.5–35.7)
MCV RBC AUTO: 86.2 FL (ref 79–97)
PLATELET # BLD AUTO: 278 10*3/MM3 (ref 140–450)
PMV BLD AUTO: 9.4 FL (ref 6–12)
POTASSIUM SERPL-SCNC: 3.9 MMOL/L (ref 3.5–5.2)
QT INTERVAL: 482 MS
RBC # BLD AUTO: 3.99 10*6/MM3 (ref 3.77–5.28)
SODIUM SERPL-SCNC: 137 MMOL/L (ref 136–145)
WBC NRBC COR # BLD: 7.86 10*3/MM3 (ref 3.4–10.8)

## 2023-05-14 PROCEDURE — G0378 HOSPITAL OBSERVATION PER HR: HCPCS

## 2023-05-14 PROCEDURE — 80048 BASIC METABOLIC PNL TOTAL CA: CPT | Performed by: INTERNAL MEDICINE

## 2023-05-14 PROCEDURE — 82948 REAGENT STRIP/BLOOD GLUCOSE: CPT

## 2023-05-14 PROCEDURE — 63710000001 INSULIN LISPRO (HUMAN) PER 5 UNITS: Performed by: INTERNAL MEDICINE

## 2023-05-14 PROCEDURE — 96361 HYDRATE IV INFUSION ADD-ON: CPT

## 2023-05-14 PROCEDURE — 63710000001 INSULIN LISPRO (HUMAN) PER 5 UNITS: Performed by: NURSE PRACTITIONER

## 2023-05-14 PROCEDURE — 85027 COMPLETE CBC AUTOMATED: CPT | Performed by: INTERNAL MEDICINE

## 2023-05-14 PROCEDURE — 25010000002 CEFTRIAXONE PER 250 MG: Performed by: NURSE PRACTITIONER

## 2023-05-14 RX ORDER — SODIUM CHLORIDE 9 MG/ML
100 INJECTION, SOLUTION INTRAVENOUS CONTINUOUS
Status: ACTIVE | OUTPATIENT
Start: 2023-05-14 | End: 2023-05-14

## 2023-05-14 RX ORDER — INSULIN LISPRO 100 [IU]/ML
10 INJECTION, SOLUTION INTRAVENOUS; SUBCUTANEOUS
Status: DISCONTINUED | OUTPATIENT
Start: 2023-05-14 | End: 2023-05-15 | Stop reason: HOSPADM

## 2023-05-14 RX ORDER — LORAZEPAM 0.5 MG/1
0.5 TABLET ORAL ONCE AS NEEDED
Status: COMPLETED | OUTPATIENT
Start: 2023-05-14 | End: 2023-05-14

## 2023-05-14 RX ADMIN — PREGABALIN 50 MG: 50 CAPSULE ORAL at 20:18

## 2023-05-14 RX ADMIN — DULOXETINE HYDROCHLORIDE 30 MG: 30 CAPSULE, DELAYED RELEASE ORAL at 08:23

## 2023-05-14 RX ADMIN — INSULIN LISPRO 10 UNITS: 100 INJECTION, SOLUTION INTRAVENOUS; SUBCUTANEOUS at 16:59

## 2023-05-14 RX ADMIN — PREGABALIN 50 MG: 50 CAPSULE ORAL at 16:58

## 2023-05-14 RX ADMIN — ATORVASTATIN CALCIUM 80 MG: 80 TABLET, FILM COATED ORAL at 20:18

## 2023-05-14 RX ADMIN — Medication 3 MG: at 20:18

## 2023-05-14 RX ADMIN — INSULIN LISPRO 6 UNITS: 100 INJECTION, SOLUTION INTRAVENOUS; SUBCUTANEOUS at 08:24

## 2023-05-14 RX ADMIN — HYDROCODONE BITARTRATE AND ACETAMINOPHEN 1 TABLET: 5; 325 TABLET ORAL at 08:27

## 2023-05-14 RX ADMIN — Medication 10 ML: at 08:24

## 2023-05-14 RX ADMIN — CEFTRIAXONE SODIUM 1 G: 1 INJECTION, POWDER, FOR SOLUTION INTRAMUSCULAR; INTRAVENOUS at 01:34

## 2023-05-14 RX ADMIN — LORAZEPAM 0.5 MG: 0.5 TABLET ORAL at 14:22

## 2023-05-14 RX ADMIN — HYDROCODONE BITARTRATE AND ACETAMINOPHEN 1 TABLET: 5; 325 TABLET ORAL at 16:59

## 2023-05-14 RX ADMIN — Medication 10 ML: at 20:18

## 2023-05-14 RX ADMIN — INSULIN LISPRO 10 UNITS: 100 INJECTION, SOLUTION INTRAVENOUS; SUBCUTANEOUS at 11:40

## 2023-05-14 RX ADMIN — Medication 1000 MCG: at 08:23

## 2023-05-14 RX ADMIN — BISOPROLOL FUMARATE 5 MG: 5 TABLET, FILM COATED ORAL at 08:23

## 2023-05-14 RX ADMIN — PANTOPRAZOLE SODIUM 40 MG: 40 TABLET, DELAYED RELEASE ORAL at 06:09

## 2023-05-14 RX ADMIN — LEVOTHYROXINE SODIUM 112 MCG: 0.11 TABLET ORAL at 08:23

## 2023-05-14 RX ADMIN — PREGABALIN 50 MG: 50 CAPSULE ORAL at 08:23

## 2023-05-14 RX ADMIN — AMLODIPINE BESYLATE 5 MG: 5 TABLET ORAL at 08:23

## 2023-05-14 RX ADMIN — INSULIN LISPRO 7 UNITS: 100 INJECTION, SOLUTION INTRAVENOUS; SUBCUTANEOUS at 11:41

## 2023-05-14 RX ADMIN — INSULIN LISPRO 8 UNITS: 100 INJECTION, SOLUTION INTRAVENOUS; SUBCUTANEOUS at 08:24

## 2023-05-14 RX ADMIN — INSULIN LISPRO 10 UNITS: 100 INJECTION, SOLUTION INTRAVENOUS; SUBCUTANEOUS at 16:58

## 2023-05-14 RX ADMIN — SODIUM CHLORIDE 100 ML/HR: 9 INJECTION, SOLUTION INTRAVENOUS at 06:09

## 2023-05-14 RX ADMIN — INSULIN GLARGINE-YFGN 50 UNITS: 100 INJECTION, SOLUTION SUBCUTANEOUS at 20:18

## 2023-05-14 NOTE — PLAN OF CARE
Goal Outcome Evaluation:  Plan of Care Reviewed With: patient        Progress: improving  Outcome Evaluation: Pt VSS. Pt A&Ox4. IV fluids infusing. MRi of foot completed. Patient had sedation medication prior to MRI for comfort. Still discomfort urinatinon and L foot. No other acute events during shift.

## 2023-05-14 NOTE — PROGRESS NOTES
" LOS: 1 day     Name: Halie Guidry  Age: 82 y.o.  Sex: female  :  1940  MRN: [id number]         Primary Care Physician: Napoleon Mead MD    Subjective   Subjective  Patient reports feeling overall better today.  Denies abdominal pain.  Not much pain in her left foot today.  Still with dysuria.    Objective   Vital Signs  Temp:  [97.3 °F (36.3 °C)-98.7 °F (37.1 °C)] 97.6 °F (36.4 °C)  Heart Rate:  [57-61] 59  Resp:  [16] 16  BP: (131-155)/(62-76) 155/76  Body mass index is 25.85 kg/m².    Objective:  General Appearance:  Comfortable and in no acute distress.    Vital signs: (most recent): Blood pressure 155/76, pulse 59, temperature 97.6 °F (36.4 °C), temperature source Oral, resp. rate 16, height 172.7 cm (68\"), weight 77.1 kg (170 lb), SpO2 95 %, not currently breastfeeding.    Lungs:  Normal effort and normal respiratory rate.    Heart: Normal rate.  Regular rhythm.    Abdomen: Abdomen is soft.  Bowel sounds are normal.   There is no abdominal tenderness.     Extremities: There is dependent edema.  There is no local swelling.  (Still with tenderness as I palpate over the plantar aspect of the left great toe around the bunion)  Neurological: Patient is alert and oriented to person, place and time.    Skin:  Warm and dry.  (Mild erythema and improving swelling to the bilateral distal lower extremities)            Results Review:       I reviewed the patient's new clinical results.    Results from last 7 days   Lab Units 23  0954 23   WBC 10*3/mm3 7.86 8.78 9.05   HEMOGLOBIN g/dL 11.3* 11.8* 12.3   PLATELETS 10*3/mm3 278 299 347     Results from last 7 days   Lab Units 23  0954 23  0621 23  0213   SODIUM mmol/L 137 135* 133*   POTASSIUM mmol/L 3.9 3.9 4.5   CHLORIDE mmol/L 102 101 99   CO2 mmol/L 19.8* 23.0 21.6*   BUN mg/dL 30* 39* 39*   CREATININE mg/dL 1.18* 1.52* 1.79*   CALCIUM mg/dL 8.9 8.7 9.4   GLUCOSE mg/dL 354* 270* 415*     Results from " last 7 days   Lab Units 05/13/23  0213   INR  1.03             Scheduled Meds:   amLODIPine, 5 mg, Oral, Daily  atorvastatin, 80 mg, Oral, Nightly  bisoprolol, 5 mg, Oral, Daily  cefTRIAXone, 1 g, Intravenous, Q24H  DULoxetine, 30 mg, Oral, Daily  insulin glargine, 50 Units, Subcutaneous, Nightly  insulin lispro, 10 Units, Subcutaneous, TID With Meals  insulin lispro, 2-9 Units, Subcutaneous, TID With Meals  levothyroxine, 112 mcg, Oral, Daily  melatonin, 3 mg, Oral, Nightly  pantoprazole, 40 mg, Oral, Q AM  pregabalin, 50 mg, Oral, TID  sodium chloride, 10 mL, Intravenous, Q12H  cyanocobalamin, 1,000 mcg, Oral, Daily      PRN Meds:   •  acetaminophen **OR** acetaminophen **OR** acetaminophen  •  calcium carbonate  •  dextrose  •  dextrose  •  glucagon (human recombinant)  •  HYDROcodone-acetaminophen  •  nitroglycerin  •  ondansetron **OR** ondansetron  •  [COMPLETED] Insert Peripheral IV **AND** sodium chloride  •  sodium chloride  •  sodium chloride  Continuous Infusions:  sodium chloride, 100 mL/hr        Assessment & Plan   Active Hospital Problems    Diagnosis  POA   • Acute pain of left foot [M79.672]  Unknown   • Acute cystitis without hematuria [N30.00]  Yes   • History of Clostridium difficile infection [Z86.19]  Yes   • VIPUL (acute kidney injury) [N17.9]  Unknown   • 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       Assessment & Plan    -Continue Rocephin.  Blood and urine cultures are negative I think she could have a mild cellulitis of the lower extremities that is improving and thus will continue antibiotics for now.  -Renal function is improving going to check an MRI of the left foot to rule out any deep infection around the left great toe  -We will give 1 more liter of fluid at  100 cc/h and then stop  -Blood sugars are uncontrolled.  Increase Lantus to 50 units for this evening.  Increase lispro to 10 units 3 times daily and continue sliding scale        Expected discharge date/ time has not been documented.     Tang Beal MD  [facility]  05/14/23  11:52 EDT

## 2023-05-14 NOTE — PLAN OF CARE
Goal Outcome Evaluation:  Plan of Care Reviewed With: patient        Progress: no change  Outcome Evaluation: VSS. A&Ox4. PRN meds for pain. C/o buring with urination. IVF infusing & IV abx given. Pt had difficulty sleeping this shift. Dressing placed on wound to gluteal. BG monitoring. Bed alarm on. No acute events this shift

## 2023-05-14 NOTE — SIGNIFICANT NOTE
05/14/23 1124   OTHER   Discipline physical therapist   Rehab Time/Intention   Session Not Performed patient/family declined, not feeling well  (pt declined PT eval stating she would like to rest and await MRI results; pt request PT f/u tomorrow - RN notified)   Therapy Assessment/Plan (PT)   Criteria for Skilled Interventions Met (PT) yes   Recommendation   PT - Next Appointment 05/15/23

## 2023-05-15 ENCOUNTER — READMISSION MANAGEMENT (OUTPATIENT)
Dept: CALL CENTER | Facility: HOSPITAL | Age: 83
End: 2023-05-15
Payer: MEDICARE

## 2023-05-15 VITALS
HEART RATE: 57 BPM | WEIGHT: 170 LBS | HEIGHT: 68 IN | BODY MASS INDEX: 25.76 KG/M2 | RESPIRATION RATE: 18 BRPM | TEMPERATURE: 98.3 F | SYSTOLIC BLOOD PRESSURE: 149 MMHG | OXYGEN SATURATION: 100 % | DIASTOLIC BLOOD PRESSURE: 67 MMHG

## 2023-05-15 PROBLEM — L03.116 CELLULITIS AND ABSCESS OF LEFT LOWER EXTREMITY: Status: ACTIVE | Noted: 2023-05-15

## 2023-05-15 PROBLEM — L02.416 CELLULITIS AND ABSCESS OF LEFT LOWER EXTREMITY: Status: ACTIVE | Noted: 2023-05-15

## 2023-05-15 LAB
ANION GAP SERPL CALCULATED.3IONS-SCNC: 11 MMOL/L (ref 5–15)
BUN SERPL-MCNC: 29 MG/DL (ref 8–23)
BUN/CREAT SERPL: 26.4 (ref 7–25)
CALCIUM SPEC-SCNC: 8.8 MG/DL (ref 8.6–10.5)
CHLORIDE SERPL-SCNC: 103 MMOL/L (ref 98–107)
CO2 SERPL-SCNC: 23 MMOL/L (ref 22–29)
CREAT SERPL-MCNC: 1.1 MG/DL (ref 0.57–1)
DEPRECATED RDW RBC AUTO: 39.9 FL (ref 37–54)
EGFRCR SERPLBLD CKD-EPI 2021: 50.3 ML/MIN/1.73
ERYTHROCYTE [DISTWIDTH] IN BLOOD BY AUTOMATED COUNT: 13.3 % (ref 12.3–15.4)
GLUCOSE BLDC GLUCOMTR-MCNC: 122 MG/DL (ref 70–130)
GLUCOSE BLDC GLUCOMTR-MCNC: 236 MG/DL (ref 70–130)
GLUCOSE BLDC GLUCOMTR-MCNC: 250 MG/DL (ref 70–130)
GLUCOSE SERPL-MCNC: 275 MG/DL (ref 65–99)
HCT VFR BLD AUTO: 34.6 % (ref 34–46.6)
HGB BLD-MCNC: 11.7 G/DL (ref 12–15.9)
MCH RBC QN AUTO: 28.2 PG (ref 26.6–33)
MCHC RBC AUTO-ENTMCNC: 33.8 G/DL (ref 31.5–35.7)
MCV RBC AUTO: 83.4 FL (ref 79–97)
PLATELET # BLD AUTO: 282 10*3/MM3 (ref 140–450)
PMV BLD AUTO: 9.7 FL (ref 6–12)
POTASSIUM SERPL-SCNC: 4.1 MMOL/L (ref 3.5–5.2)
RBC # BLD AUTO: 4.15 10*6/MM3 (ref 3.77–5.28)
SODIUM SERPL-SCNC: 137 MMOL/L (ref 136–145)
WBC NRBC COR # BLD: 9.74 10*3/MM3 (ref 3.4–10.8)

## 2023-05-15 PROCEDURE — 97166 OT EVAL MOD COMPLEX 45 MIN: CPT

## 2023-05-15 PROCEDURE — 85027 COMPLETE CBC AUTOMATED: CPT | Performed by: INTERNAL MEDICINE

## 2023-05-15 PROCEDURE — 63710000001 INSULIN LISPRO (HUMAN) PER 5 UNITS: Performed by: NURSE PRACTITIONER

## 2023-05-15 PROCEDURE — 97530 THERAPEUTIC ACTIVITIES: CPT

## 2023-05-15 PROCEDURE — 82948 REAGENT STRIP/BLOOD GLUCOSE: CPT

## 2023-05-15 PROCEDURE — 25010000002 CEFTRIAXONE PER 250 MG: Performed by: NURSE PRACTITIONER

## 2023-05-15 PROCEDURE — 63710000001 INSULIN LISPRO (HUMAN) PER 5 UNITS: Performed by: INTERNAL MEDICINE

## 2023-05-15 PROCEDURE — G0378 HOSPITAL OBSERVATION PER HR: HCPCS

## 2023-05-15 PROCEDURE — 97162 PT EVAL MOD COMPLEX 30 MIN: CPT

## 2023-05-15 PROCEDURE — 80048 BASIC METABOLIC PNL TOTAL CA: CPT | Performed by: INTERNAL MEDICINE

## 2023-05-15 RX ORDER — AMMONIUM LACTATE 120 MG/G
1 CREAM TOPICAL EVERY 12 HOURS PRN
Status: DISCONTINUED | OUTPATIENT
Start: 2023-05-15 | End: 2023-05-15 | Stop reason: HOSPADM

## 2023-05-15 RX ADMIN — ANORECTAL OINTMENT 1 APPLICATION: 15.7; .44; 24; 20.6 OINTMENT TOPICAL at 15:07

## 2023-05-15 RX ADMIN — Medication 10 ML: at 09:29

## 2023-05-15 RX ADMIN — DULOXETINE HYDROCHLORIDE 30 MG: 30 CAPSULE, DELAYED RELEASE ORAL at 09:28

## 2023-05-15 RX ADMIN — INSULIN LISPRO 4 UNITS: 100 INJECTION, SOLUTION INTRAVENOUS; SUBCUTANEOUS at 11:54

## 2023-05-15 RX ADMIN — Medication 1000 MCG: at 09:28

## 2023-05-15 RX ADMIN — INSULIN LISPRO 6 UNITS: 100 INJECTION, SOLUTION INTRAVENOUS; SUBCUTANEOUS at 09:29

## 2023-05-15 RX ADMIN — PANTOPRAZOLE SODIUM 40 MG: 40 TABLET, DELAYED RELEASE ORAL at 05:50

## 2023-05-15 RX ADMIN — PREGABALIN 50 MG: 50 CAPSULE ORAL at 09:28

## 2023-05-15 RX ADMIN — INSULIN LISPRO 10 UNITS: 100 INJECTION, SOLUTION INTRAVENOUS; SUBCUTANEOUS at 09:29

## 2023-05-15 RX ADMIN — BISOPROLOL FUMARATE 5 MG: 5 TABLET, FILM COATED ORAL at 09:29

## 2023-05-15 RX ADMIN — AMLODIPINE BESYLATE 5 MG: 5 TABLET ORAL at 09:29

## 2023-05-15 RX ADMIN — INSULIN LISPRO 10 UNITS: 100 INJECTION, SOLUTION INTRAVENOUS; SUBCUTANEOUS at 17:27

## 2023-05-15 RX ADMIN — CEFTRIAXONE SODIUM 1 G: 1 INJECTION, POWDER, FOR SOLUTION INTRAMUSCULAR; INTRAVENOUS at 01:56

## 2023-05-15 RX ADMIN — HYDROCODONE BITARTRATE AND ACETAMINOPHEN 1 TABLET: 5; 325 TABLET ORAL at 15:03

## 2023-05-15 RX ADMIN — LEVOTHYROXINE SODIUM 112 MCG: 0.11 TABLET ORAL at 09:29

## 2023-05-15 RX ADMIN — INSULIN LISPRO 10 UNITS: 100 INJECTION, SOLUTION INTRAVENOUS; SUBCUTANEOUS at 11:54

## 2023-05-15 RX ADMIN — PREGABALIN 50 MG: 50 CAPSULE ORAL at 15:00

## 2023-05-15 NOTE — PLAN OF CARE
Goal Outcome Evaluation:  Plan of Care Reviewed With: patient           Outcome Evaluation: Pt seen for OT eval after admission for L foot pain. Pt reports she is Gm w/ FWW at home and able to complete ADLs without assist from her son (who lives with her but works from home). Pt able to transfer STS with modA x 2 and to chair with Jayson x 2 using FWW. Pt with decreased activity tolerance 2/2 pain in L foot. Anticipate pt not highly mobile at home at baseline, but son not present to confirm. OT will con't to follow for stated goals and recommend d/c to SNF (should pt decline would recommend home with assist and HHOT).

## 2023-05-15 NOTE — PLAN OF CARE
Goal Outcome Evaluation:  Plan of Care Reviewed With: patient        Progress: no change  Outcome Evaluation: Pt is a 81 y/o F who presented to ED with c/o L great toe pain xfew days. Pt noted to have infection of a bunion. Imaging (-). Pt reports being independent at baseline with use of RW and assist as needed from her son who is living with her. Pt is currently SBA for bed mobility and sitting balance, ModA x2 for STS to RW and Vaughn x2 for step pivot to bedside chair w/ RW. Pt was heavily flexed fwd onto the RW having difficulty pushing through BUEs for trunk extension and to offweight her LLE. Pt unsteady with step pivot over to bedside chair, but no overt LOB noted. Pt declined additional mobility or fwd ambulation due to fatigue and L great toe pain. Educated pt on activity rec of sitting UIC TID for at least one hour each, therapy POC/goals and DC rec. Pt currently presents with decreased strength, impaired balance, decreased endurance/activity tolerance and increased pain with mobility. Pt will benefit from skilled PT services during this admission to progress her functional mobility and decrease falls risk. Rec pt to DC to SNF as pt is requiring increased assistance for OOB mobility and unable to perform fwd ambulation.

## 2023-05-15 NOTE — NURSING NOTE
Wound/Ostomy: Consult received regarding skin issue on Gluteal area and  left foot. Patient is alert, oriented, able to repositioning with assistance, upon assessment is observed red discoloration, blanchable area, possibly related with moisture and/or friction, she stated have bad diarrhea  . In addition we could  see bilateral lower leg/feet swollen appearance, red, shiny and tender to touch skin, to left distal plantar foot black eschar  was noted, d/t Diabetic foot.   Wound care order and pressure preventive measures have been implemented.  Low air loss mattress for adequate pressure redistribution allow ing better circulation and pressure relief from Agility and frame from EVS is requested.  Rn notified. Please re-consult for any additional needs.

## 2023-05-15 NOTE — PLAN OF CARE
Goal Outcome Evaluation:  Plan of Care Reviewed With: patient        Progress: improving  Outcome Evaluation: VSS. A&Ox4. No c/o pain. IV abx given. Pt rested well. Marilou on. Wound nurse to see pt this am

## 2023-05-15 NOTE — CASE MANAGEMENT/SOCIAL WORK
Continued Stay Note  [facility]     Patient Name: Halie Guidry  MRN: [id number]  Today's Date: 5/15/2023    Admit Date: 5/12/2023    Plan: Home with son and HH.   Discharge Plan     Row Name 05/15/23 1523       Plan    Plan Comments [facility] - they can accept and will be able start on Wed or Thurs.  She will notify pt and her son.    Final Note DC home with [facility]. ..........Vannessa MARISCAL/ ODIN    Row Name 05/15/23 1436       Plan    Plan Home with son and HH.    Plan Comments PT notes reviewed w/ pt.  CCP strongly recommended pt go to short term rehab, but pt declines.  She states her son can assist her at home.  She is in agreement w/ HH and states she has used [facility] in the past.  Referral called to [facility] - arnav pending. Per pt's consent, call placed to her son Derrick ([phone]) who states he has concerns about continuing to take care of her at home, but he knows pt will refuse SNF.   CCP discussed other options including assisted living or personal care.  He states they have been trying to avoid that.  He will transport pt home today and will assist her as needed.............Vann[facility] MARISCAL/ ODIN    Row Name 05/15/23 1244       Plan    Plan Home, family to transport    Patient/Family in Agreement with Plan yes    Plan Comments CCP spoke with patient at bedside; explained role, verified facesheet, and discussed dc plan. Patient uses a walker, bedside commode, rollator, and shower chair at home. She lives with her son in a 1 level home with no steps to get inside. She has used [facility] in the past. She has been to [place], [facility] [place]. Patient states the plan is home and denies any HH or DME needs. Family to transport. RICHARD, CSW               Discharge Codes    No documentation.               Expected Discharge Date and Time     Expected Discharge Date Expected Discharge Time    May 15, 2023             Vannessa Petersen  PAUL Argueta

## 2023-05-15 NOTE — DISCHARGE SUMMARY
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       DETAILS OF HOSPITAL STAY     Pertinent Test Results and Procedures Performed    X-ray of the left foot showed no acute osseous finding    Chest x-ray showed no acute infiltrate, consolidation, or effusion    Hospital Course  This is an 82-year-old female with history of hypertension, chronic kidney disease and history of UTIs who presented to the emergency room with complaints of left great toe pain associated with a bunion present there.  Please see H&P for full details.  She is also suspected to have a UTI upon presentation.  There was no erythema, swelling, or drainage around her great toe.  She denied any fevers.  She was admitted and started on Rocephin for potential UTI and lower extremity cellulitis.  X-ray of the left foot did not show any acute osseous abnormality.  Attempted to obtain MRI of this area but the patient could not tolerate laying still for that long due to back pain and some claustrophobia.  Serial exams of the toe show no change and suspicion for infection in this area is extremely low.  I do think she may have had a lower extremity cellulitis which was mild and improved at this point through the use of Rocephin.  Her urine culture ended up coming back negative.  She completed a 3-day course of antibiotics.  She had an acute kidney injury  upon presentation which resolved with IV fluids.  She is evaluated by physical therapy who recommended SNF however the patient has declined and request discharge home today.  She is medically stable and will be released.  I recommended that she see a podiatrist in the outpatient setting for her foot.    Physical Exam at Discharge:  General: No acute distress, AAOx3  HEENT: EOMI, PERRL  Cardiovascular: +s1 and s2, RRR  Lungs: No rhonchi or wheezing  Abdomen: soft, nontender    Consults:   Consults     No orders found from 4/13/2023 to 5/13/2023.            Condition on Discharge: Stable, improved    [place] [facility] [place]charge Medications     Discharge Medications      Continue These Medications      Instructions Start Date   acetaminophen 325 MG tablet  Commonly known as: TYLENOL   650 mg, Oral, Every 6 Hours PRN      amLODIPine 5 MG tablet  Commonly known as: NORVASC   5 mg, Oral, Daily      atorvastatin 80 MG tablet  Commonly known as: LIPITOR   TAKE 1 TABLET EVERY NIGHT      BD Pen Needle Naila 2nd Gen 32G X 4 MM misc  Generic drug: Insulin Pen Needle   USE TO INJECT INSULIN FOUR TIMES DAILY      bisoprolol 5 MG tablet  Commonly known as: ZEBeta   5 mg, Oral, Daily      chlorthalidone 25 MG tablet  Commonly known as: HYGROTON   25 mg, Oral, Daily      cyanocobalamin 1000 MCG tablet  Commonly known as: VITAMIN B-12   1,000 mcg, Oral, Daily      DULoxetine 30 MG capsule  Commonly known as: CYMBALTA   30 mg, Oral, Daily      HYDROcodone-acetaminophen 5-325 MG per tablet  Commonly known as: NORCO   1 tablet, Oral, Every 6 Hours PRN      hydrocortisone-zinc oxide-bacitracin-nystatin cream   1 application, Topical, 2 Times Daily PRN      insulin glargine 100 UNIT/ML injection  Commonly known as: LANTUS, SEMGLEE   40 Units, Subcutaneous, Nightly      insulin lispro 100 UNIT/ML injection  Commonly known as: HUMALOG/ADMELOG   8 Units, Subcutaneous, 3 Times Daily With Meals      insulin lispro  100 UNIT/ML injection  Commonly known as: HUMALOG/ADMELOG   0-7 Units, Subcutaneous, 4 Times Daily With Meals & Nightly      lansoprazole 15 MG capsule  Commonly known as: PREVACID   15 mg, Oral, Daily      levothyroxine 112 MCG tablet  Commonly known as: SYNTHROID, LEVOTHROID   TAKE 1 TABLET DAILY      lisinopril 40 MG tablet  Commonly known as: PRINIVIL,ZESTRIL   40 mg, Oral, Daily      melatonin 3 MG tablet   3 mg, Oral, Nightly      pregabalin 50 MG capsule  Commonly known as: LYRICA   50 mg, Oral, 3 Times Daily      Trulicity 1.5 MG/0.5ML solution pen-injector  Generic drug: Dulaglutide   1.5 mg, Subcutaneous, Weekly, sunday             Discharge Diet:   Diet Instructions     Diet: Regular/House Diet, Diabetic Diets; Consistent Carbohydrate; Regular Texture (IDDSI 7); Thin (IDDSI 0)      Discharge Diet:  Regular/House Diet  Diabetic Diets       Diabetic Diet: Consistent Carbohydrate    Texture: Regular Texture (IDDSI 7)    Fluid Consistency: Thin (IDDSI 0)          Activity at Discharge:   Activity Instructions     Activity as Tolerated            Follow-up Appointments  No future appointments.  Additional Instructions for the Follow-ups that You Need to Schedule     Discharge Follow-up with PCP   As directed       Currently Documented PCP:    Napoleon Mead MD    PCP Phone Number:    [phone]     Follow Up Details: 1 week               Test Results Pending at Discharge  Pending Labs     Order Current Status    Blood Culture - Blood, Arm, Left Preliminary result    Blood Culture - Blood, Arm, Right Preliminary result          I have examined and discussed discharge planning with the patient today.    I wore full protective equipment throughout the patient encounter including eye protection and facemask.  Hand hygiene was performed before donning protective equipment and after removal when leaving the room.     Tang Beal MD  05/15/23  12:07 EDT    Time: Discharge greater than 30  min

## 2023-05-15 NOTE — CASE MANAGEMENT/SOCIAL WORK
Continued Stay Note  [facility]     Patient Name: Halie Guidry  MRN: [id number]  Today's Date: 5/15/2023    Admit Date: 5/12/2023    Plan: Home with son and HH.   Discharge Plan     Row Name 05/15/23 1433       Plan    Plan Home with son and HH.    Plan Comments PT notes reviewed w/ pt.  CCP strongly recommended pt go to short term rehab, but pt declines.  She states her son can assist her at home.  She is in agreement w/ HH and states she has used [facility] in the past.  Referral called to Christina/ Donnell josé pending. Per pt's consent, call placed to her son Derrick ([phone]) who states he has concerns about continuing to take care of her at home, but he knows pt will refuse SNF.   CCP discussed other options including assisted living or personal care.  He states they have been trying to avoid that.  He will transport pt home today and will assist her as needed.............Vannessa MARISCAL/ ODIN    Row Name 05/15/23 4659       Plan    Plan Home, family to transport    Patient/Family in Agreement with Plan yes    Plan Comments CCP spoke with patient at bedside; explained role, verified facesheet, and discussed dc plan. Patient uses a walker, bedside commode, rollator, and shower chair at home. She lives with her son in a 1 level home with no steps to get inside. She has used [facility] in the past. She has been to [place], [facility] [place]. Patient states the plan is home and denies any HH or DME needs. Family to transport. RICHARD, CSW               Discharge Codes    No documentation.               Expected Discharge Date and Time     Expected Discharge Date Expected Discharge Time    May 15, 2023             Vannessa Argueta RN

## 2023-05-15 NOTE — CASE MANAGEMENT/SOCIAL WORK
Discharge Planning Assessment  [facility]     Patient Name: Halie Guidry  MRN: [id number]  Today's Date: 5/15/2023    Admit Date: 5/12/2023    Plan: Home, family to transport   Discharge Needs Assessment     Row Name 05/15/23 1244       Living Environment    People in Home child(beatriz), adult    Current Living Arrangements home    Primary Care Provided by self    Provides Primary Care For no one    Family Caregiver if Needed child(beatriz), adult    Able to Return to Prior Arrangements yes       Transition Planning    Patient/Family Anticipates Transition to home with family    Patient/Family Anticipated Services at Transition none    Transportation Anticipated family or friend will provide       Discharge Needs Assessment    Equipment Currently Used at Home walker, rolling;commode;rollator;shower chair    Concerns to be Addressed discharge planning    Equipment Needed After Discharge none               Discharge Plan     Row Name 05/15/23 1244       Plan    Plan Home, family to transport    Patient/Family in Agreement with Plan yes    Plan Comments CCP spoke with patient at bedside; explained role, verified facesheet, and discussed dc plan. Patient uses a walker, bedside commode, rollator, and shower chair at home. She lives with her son in a 1 level home with no steps to get inside. [facility]. She has been to [place], [facility], [facility] [place]. Patient states the plan is home and denies any HH or DME needs. Family to transport. RICHARD, ESCOBARW              Continued Care and Services - Admitted Since 5/12/2023    Coordination has not been started for this encounter.       Expected Discharge Date and Time     Expected Discharge Date Expected Discharge Time    May 15, 2023          Demographic Summary     Row Name 05/15/23 1243       General Information    Admission Type inpatient    Arrived From home    Referral Source admission list    Reason for Consult  discharge planning    Preferred Language English       Contact Information    Permission Granted to Share Info With family/designee               Functional Status     Row Name 05/15/23 1246       Functional Status    Usual Activity Tolerance moderate    Current Activity Tolerance moderate       Functional Status, IADL    Medications assistive equipment    Meal Preparation assistive equipment    Housekeeping assistive equipment    Laundry assistive equipment    Shopping assistive equipment       Mental Status    General Appearance WDL WDL               Psychosocial    No documentation.                Abuse/Neglect    No documentation.                Legal    No documentation.                Substance Abuse    No documentation.                Patient Forms    No documentation.                   SAFIA Tellez

## 2023-05-15 NOTE — DISCHARGE PLACEMENT REQUEST
"Tabatha Guidry (82 y.o. Female)     Date of Birth   1940    Social Security Number       Address   [address] [place] [address] [address] [place] [address]    Home Phone   [phone]    MRN   [id number]       Episcopalian   Spiritism    Marital Status                               Admission Date   5/12/23    Admission Type   Emergency    Admitting Provider   Tang Beal MD    Attending Provider   Tang Beal MD    Department, Room/Bed   [address], S[address]/1       Discharge Date       [place] [facility] [place]ination                               Attending Provider: Tang Beal MD    Allergies: Sulfa Antibiotics    Isolation: None   Infection: None   Code Status: CPR    Ht: 172.7 cm (68\")   Wt: 77.1 kg (170 lb)    Admission Cmt: None   Principal Problem: Acute pain of left foot [M79.672]                 Active Insurance as of 5/12/2023     Primary Coverage     Payor Plan Insurance Group Employer/Plan Group    [facility] MEDICARE REPLACEMENT [facility] MEDICARE REPLACEMENT [id number]     Payor Plan Address Payor Plan Phone Number Payor Plan Fax Number Effective Dates    [address] [address]   1/1/2016 - None Entered    [facility] [id number]       Subscriber Name Subscriber Birth Date Member ID       TABATHA GUIDRY 1940 [id number]                 Emergency Contacts      (Rel.) Home Phone Work Phone Mobile Phone    Derrick Guidry (Son) [phone] -- [phone]    Josse Guidry (Son) [phone] -- [phone]              "

## 2023-05-15 NOTE — THERAPY EVALUATION
Patient Name: Halie Guidry  : 1940    MRN: [id number]                              Today's Date: 5/15/2023       Admit Date: 2023    Visit Dx:     ICD-10-CM ICD-9-CM   1. Urinary tract infection without hematuria, site unspecified  N39.0 599.0   2. Generalized weakness  R53.1 780.79   3. Diabetic ulcer of left midfoot associated with type 2 diabetes mellitus, unspecified ulcer stage  E11.621 250.80    L97.429 707.14     Patient Active Problem List   Diagnosis   • Compression fracture   • Lumbar degenerative disc disease   • Type 2 diabetes mellitus, with long-term current use of insulin   • Hyperlipidemia   • Benign essential hypertension   • Primary hypothyroidism   • Leukocytosis   • Neuropathy   • Osteoporosis   • Proteinuria   • Tobacco abuse   • Diabetic eye exam   • Generalized weakness   • Spinal stenosis   • Scoliosis   • Arthritis   • VBI (vertebrobasilar insufficiency)   • Orthostatic hypotension   • 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   • Severe hypothyroidism   • Noncompliance with medication regimen   • Vitamin D deficiency disease   • Altered mental status   • Tremor   • Seizure (HCC)   • Stage 3a chronic kidney disease   • Thoracic degenerative disc disease   • Metabolic encephalopathy   • VIPUL (acute kidney injury)   • Hyperglycemia   • Type II diabetes mellitus, uncontrolled   • Lower abdominal pain   • Transaminitis   • COVID-19 virus detected   • UTI (urinary tract infection)   • Emphysematous cystitis   • Choledocholithiasis   • Hypokalemia   • Hypoxia   • Generalized abdominal pain   • History of Clostridium difficile infection   • History of ERCP   • Acute UTI (urinary tract infection)   • Hypomagnesemia   • Burning pain   • Anxiety disorder   • Neuropathic pain   • Intertrigo   • Weakness of both lower extremities   • Accelerated hypertension   • Acute cystitis without hematuria   • Altered mental status, unspecified altered mental status type   • Left  lower lobe pneumonia   • Acute pain of left foot   • Cellulitis and abscess of left lower extremity     Past Medical History:   Diagnosis Date   • Acute metabolic encephalopathy 6/24/2022   • Anxiety    • Arthritis    • Benign essential hypertension 08/20/2014   • Bleeding disorder    • Depression    • Diabetes    • Diabetes mellitus, type 2    • Disc degeneration, lumbar    • Headache, tension-type    • Hyperlipidemia    • Hypothyroidism    • Neuropathy    • Osteoporosis 09/09/2015   • Peripheral neuropathy    • Rotator cuff tear, left    • Scoliosis    • Shoulder pain     LEFT, TORN ROTATOR CUFF S/P FALL   • Spinal stenosis      Past Surgical History:   Procedure Laterality Date   • APPENDECTOMY     • BILATERAL BREAST REDUCTION Bilateral 08/2015   • CATARACT EXTRACTION  03/2015   • CHOLECYSTECTOMY WITH INTRAOPERATIVE CHOLANGIOGRAM N/A 3/27/2022    Procedure: CHOLECYSTECTOMY LAPAROSCOPIC INTRAOPERATIVE CHOLANGIOGRAM;  Surgeon: Aiyana Hill MD;  Location: [facility] MAIN OR;  Service: General;  Laterality: N/A;   • COLONOSCOPY  06/05/2015    WNL   • ERCP N/A 2/25/2022    Procedure: ENDOSCOPIC RETROGRADE CHOLANGIOPANCREATOGRAPHY with sphincterotomy and balloon sweep;  Surgeon: Chilo Wilhelm MD;  Location: [facility] ENDOSCOPY;  Service: Gastroenterology;  Laterality: N/A;  PRE/POST - CBD stones   • ERCP N/A 3/28/2022    Procedure: ENDOSCOPIC RETROGRADE CHOLANGIOPANCREATOGRAPHY WITH SPHINCTEROTOMY AND BALLOON SWEEP;  Surgeon: Chilo Wilhelm MD;  Location: [facility] ENDOSCOPY;  Service: Gastroenterology;  Laterality: N/A;  PRE: COMMON DUCT STONE  POST: COMMON DUCT STONE   • KYPHOPLASTY     • REDUCTION MAMMAPLASTY     • TONSILLECTOMY        General Information     Row Name 05/15/23 1303          OT Time and Intention    Document Type evaluation  -CE     Mode of Treatment co-treatment;physical therapy;occupational therapy  -CE     Row Name 05/15/23 1300          General Information    Patient Profile Reviewed yes   -CE     Prior Level of Function --  Gm w/ FWW per pt; no assist for dressing/bathing per pt  -CE     Existing Precautions/Restrictions fall;seizures  -CE     Barriers to Rehab previous functional deficit  -CE     Row Name 05/15/23 1302          Living Environment    People in Home child(beatriz), adult  -CE     Row Name 05/15/23 1302          Home Main Entrance    Number of Stairs, Main Entrance none  pt reporting she has a ramp  -CE     Row Name 05/15/23 1302          Stairs Within Home, Primary    Number of Stairs, Within Home, Primary none  -CE     Row Name 05/15/23 1302          Cognition    Orientation Status (Cognition) oriented x 3  -CE     Row Name 05/15/23 1302          Safety Issues, Functional Mobility    Safety Issues Affecting Function (Mobility) insight into deficits/self-awareness  -CE     Impairments Affecting Function (Mobility) balance;endurance/activity tolerance;strength;pain  -CE           User Key  (r) = Recorded By, (t) = Taken By, (c) = Cosigned By    Initials Name Provider Type    CE Unique Yang OT Occupational Therapist                 Mobility/ADL's     Row Name 05/15/23 1304          Bed Mobility    Comment, (Bed Mobility) pt at EOB w/ CNA present prior to OT/PT arrival; SBA to scoot to EOB  -CE     Row Name 05/15/23 1304          Transfers    Transfers sit-stand transfer;bed-chair transfer  -CE     Row Name 05/15/23 1304          Bed-Chair Transfer    Bed-Chair [place] (Transfers) minimum assist (75% patient effort);2 person assist;verbal cues;nonverbal cues (demo/gesture)  -CE     Assistive Device (Bed-Chair Transfers) walker, front-wheeled  -CE     Comment, (Bed-Chair Transfer) Pt with forward flexed posture and unable to maintain upright position during transfer; declined further mobility as well.  -CE     Row Name 05/15/23 1304          Sit-Stand Transfer    Sit-Stand [place] (Transfers) moderate assist (50% patient effort);2 person assist;verbal cues  -CE      Assistive Device (Sit-Stand Transfers) walker, front-wheeled  -CE     Row Name 05/15/23 1304          Activities of Daily Living    BADL Assessment/Intervention lower body dressing;grooming  -CE     [facility] Name 05/15/23 1304          Lower Body Dressing Assessment/Training    [place] Level (Lower Body Dressing) don;socks;maximum assist (25% patient effort)  -CE     Position (Lower Body Dressing) supported sitting  -CE     Row Name 05/15/23 1304          Grooming Assessment/Training    [place] Level (Grooming) hair care, combing/brushing;standby assist;set up  -CE     Position (Grooming) supported sitting  -CE           User Key  (r) = Recorded By, (t) = Taken By, (c) = Cosigned By    Initials Name Provider Type    Unique Juarez OT Occupational Therapist               Obj/Interventions     Row Name 05/15/23 1308          Sensory Assessment (Somatosensory)    Sensory Assessment (Somatosensory) UE sensation intact  -     Sensory Subjective Reports numbness;tingling  -     Sensory Assessment present in feet 2/2 PN  -CE     Row Name 05/15/23 1308          Vision Assessment/Intervention    Visual Impairment/Limitations WFL  -CE     Row Name 05/15/23 1308          Range of Motion Comprehensive    General Range of Motion bilateral lower extremity ROM WFL  -CE     Row Name 05/15/23 1308          Strength Comprehensive (MMT)    General Manual Muscle Testing (MMT) Assessment lower extremity strength deficits identified  -CE     Comment, General Manual Muscle Testing (MMT) Assessment L shoulder 2-/5 2/2 RCT but >/= 2/5 distally; RUE 4/5; BLEs 4-/5  -CE           User Key  (r) = Recorded By, (t) = Taken By, (c) = Cosigned By    Initials Name Provider Type    Unique Juarez OT Occupational Therapist               Goals/Plan     Row Name 05/15/23 1312          Dressing Goal 1 (OT)    Activity/Device (Dressing Goal 1, OT) lower body dressing  -CE     [place]/Cues Needed (Dressing Goal 1, OT) modified  independence  -CE     Time Frame (Dressing Goal 1, OT) short term goal (STG);2 weeks  -CE     Row Name 05/15/23 1312          Toileting Goal 1 (OT)    Activity/Device (Toileting Goal 1, OT) toileting skills, all  -CE     [place] Level/Cues Needed (Toileting Goal 1, OT) modified independence  -CE     Time Frame (Toileting Goal 1, OT) short term goal (STG);2 weeks  -CE     Row Name 05/15/23 1312          Grooming Goal 1 (OT)    Activity/Device (Grooming Goal 1, OT) grooming skills, all  -CE     [place] (Grooming Goal 1, OT) modified independence  -CE     Time Frame (Grooming Goal 1, OT) short term goal (STG);2 weeks  -CE           User Key  (r) = Recorded By, (t) = Taken By, (c) = Cosigned By    Initials Name Provider Type    CE Unique Yang, OT Occupational Therapist               Clinical Impression     Row Name 05/15/23 1302          Pain Assessment    Pretreatment Pain Rating 5/10  -CE     Posttreatment Pain Rating 5/10  -CE     Pain Location - Side/Orientation Left  -CE     Pain Location - toe  -CE     Pain Intervention(s) Repositioned  -CE     Row Name 05/15/23 1305          Plan of Care Review    Plan of Care Reviewed With patient  -CE     Outcome Evaluation Pt seen for OT eval after admission for L foot pain. Pt reports she is Gm w/ FWW at home and able to complete ADLs without assist from her son (who lives with her but works from home). Pt able to transfer STS with modA x 2 and to chair with Jayson x 2 using FWW. Pt with decreased activity tolerance 2/2 pain in L foot. Anticipate pt not highly mobile at home at baseline, but son not present to confirm. OT will con't to follow for stated goals and recommend d/c to SNF (should pt decline would recommend home with assist and HHOT).  -CE     Row Name 05/15/23 7273          Therapy Assessment/Plan (OT)    Rehab Potential (OT) good, to achieve stated therapy goals  -CE     Criteria for Skilled Therapeutic Interventions Met (OT) yes  -CE     Therapy  Frequency (OT) 3 times/wk  -CE     Row Name 05/15/23 1309          Therapy Plan Review/Discharge Plan (OT)    Equipment Needs Upon Discharge (OT) shower chair  -CE     Anticipated Discharge Disposition (OT) skilled nursing facility  -CE     Row Name 05/15/23 1309          Vital Signs    O2 Delivery Pre Treatment room air  -CE     Pre Patient Position Sitting  -CE     Intra Patient Position Standing  -CE     Post Patient Position Sitting  -CE     Row Name 05/15/23 1309          Positioning and Restraints    Pre-Treatment Position in bed  -CE     Post Treatment Position chair  -CE     In Chair notified nsg;reclined;sitting;call light within reach;encouraged to call for assist  -CE           User Key  (r) = Recorded By, (t) = Taken By, (c) = Cosigned By    Initials Name Provider Type    CE Unique Yang, [place]OM[place] Occupational Therapist               Outcome Measures     Row Name 05/15/23 1313          How much help from another is currently needed...    Putting on and taking off regular lower body clothing? 2  -CE     Bathing (including washing, rinsing, and drying) 2  -CE     Toileting (which includes using toilet bed pan or urinal) 2  -CE     Putting on and taking off regular upper body clothing 3  -CE     Taking care of personal grooming (such as brushing teeth) 3  -CE     Eating meals 4  -CE     AM-PAC 6 Clicks Score (OT) 16  -CE     Row Name 05/15/23 1254 05/15/23 0931       How much help from another person do you currently need...    Turning from your back to your side while in flat bed without using bedrails? 4  -MG 4  -AW    Moving from lying on back to sitting on the side of a flat bed without bedrails? 3  -MG 4  -AW    Moving to and from a bed to a chair (including a wheelchair)? 3  -MG 3  -AW    Standing up from a chair using your arms (e.g., wheelchair, bedside chair)? 2  -MG 3  -AW    Climbing 3-5 steps with a railing? 2  -MG 3  -AW    To walk in hospital room? 3  -MG 4  -AW    AM-PAC 6 Clicks Score (PT) 17   -MG 21  -AW    Highest level of mobility 5 --> Static standing  -MG 6 --> Walked 10 steps or more  -AW    Row Name 05/15/23 1313          Functional Assessment    Outcome Measure Options AM-PAC 6 Clicks Daily Activity (OT)  -CE           User Key  (r) = Recorded By, (t) = Taken By, (c) = Cosigned By    Initials Name Provider Type    MG Melani Moctezuma, PT Physical Therapist    AW Marion Medina, RN Registered Nurse    Unique Juarez OT Occupational Therapist                Occupational Therapy Education     Title: PT OT SLP Therapies (In Progress)     Topic: Occupational Therapy (Done)     Point: ADL training (Done)     Description:   Instruct learner(s) on proper safety adaptation and remediation techniques during self care or transfers.   Instruct in proper use of assistive devices.              Learning Progress Summary           Patient Acceptance, E, VU,NR by CE at 5/15/2023 1313                   Point: Home exercise program (Done)     Description:   Instruct learner(s) on appropriate technique for monitoring, assisting and/or progressing therapeutic exercises/activities.              Learning Progress Summary           Patient Acceptance, E, VU,NR by CE at 5/15/2023 1313                   Point: Precautions (Done)     Description:   Instruct learner(s) on prescribed precautions during self-care and functional transfers.              Learning Progress Summary           Patient Acceptance, E, VU,NR by CE at 5/15/2023 1313                   Point: Body mechanics (Done)     Description:   Instruct learner(s) on proper positioning and spine alignment during self-care, functional mobility activities and/or exercises.              Learning Progress Summary           Patient Acceptance, E, VU,NR by CE at 5/15/2023 1313                               User Key     Initials Effective Dates Name Provider Type Discipline    CE 10/17/22 -  Unique Yang OT Occupational Therapist OT              OT Recommendation  and Plan  Therapy Frequency (OT): 3 times/wk  Plan of Care Review  Plan of Care Reviewed With: patient  Outcome Evaluation: Pt seen for OT eval after admission for L foot pain. Pt reports she is Gm w/ FWW at home and able to complete ADLs without assist from her son (who lives with her but works from home). Pt able to transfer STS with modA x 2 and to chair with Jayson x 2 using FWW. Pt with decreased activity tolerance 2/2 pain in L foot. Anticipate pt not highly mobile at home at baseline, but son not present to confirm. OT will con't to follow for stated goals and recommend d/c to SNF (should pt decline would recommend home with assist and HHOT).     Time Calculation:    Time Calculation- OT     Row Name 05/15/23 1314             Time Calculation- OT    OT Start Time 0955  -CE      OT Stop Time 1015  -CE      OT Time Calculation (min) 20 min  -CE      OT Received On 05/15/23  -CE      OT - Next Appointment 05/17/23  -CE      OT Goal Re-Cert Due Date 05/29/23  -CE            User Key  (r) = Recorded By, (t) = Taken By, (c) = Cosigned By    Initials Name Provider Type    Unique Juarez OT Occupational Therapist              Therapy Charges for Today     Code Description Service Date Service Provider Modifiers Qty    [id number]  OT EVAL MOD COMPLEXITY 2 5/15/2023 Unique Yang OT GO 1               Unique Yang OT  5/15/2023

## 2023-05-15 NOTE — THERAPY EVALUATION
Patient Name: Halie Guidry  : 1940    MRN: [id number]                              Today's Date: 5/15/2023       Admit Date: 2023    Visit Dx:     ICD-10-CM ICD-9-CM   1. Urinary tract infection without hematuria, site unspecified  N39.0 599.0   2. Generalized weakness  R53.1 780.79   3. Diabetic ulcer of left midfoot associated with type 2 diabetes mellitus, unspecified ulcer stage  E11.621 250.80    L97.429 707.14     Patient Active Problem List   Diagnosis   • Compression fracture   • Lumbar degenerative disc disease   • Type 2 diabetes mellitus, with long-term current use of insulin   • Hyperlipidemia   • Benign essential hypertension   • Primary hypothyroidism   • Leukocytosis   • Neuropathy   • Osteoporosis   • Proteinuria   • Tobacco abuse   • Diabetic eye exam   • Generalized weakness   • Spinal stenosis   • Scoliosis   • Arthritis   • VBI (vertebrobasilar insufficiency)   • Orthostatic hypotension   • 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   • Severe hypothyroidism   • Noncompliance with medication regimen   • Vitamin D deficiency disease   • Altered mental status   • Tremor   • Seizure (HCC)   • Stage 3a chronic kidney disease   • Thoracic degenerative disc disease   • Metabolic encephalopathy   • VIPUL (acute kidney injury)   • Hyperglycemia   • Type II diabetes mellitus, uncontrolled   • Lower abdominal pain   • Transaminitis   • COVID-19 virus detected   • UTI (urinary tract infection)   • Emphysematous cystitis   • Choledocholithiasis   • Hypokalemia   • Hypoxia   • Generalized abdominal pain   • History of Clostridium difficile infection   • History of ERCP   • Acute UTI (urinary tract infection)   • Hypomagnesemia   • Burning pain   • Anxiety disorder   • Neuropathic pain   • Intertrigo   • Weakness of both lower extremities   • Accelerated hypertension   • Acute cystitis without hematuria   • Altered mental status, unspecified altered mental status type   • Left  lower lobe pneumonia   • Acute pain of left foot     Past Medical History:   Diagnosis Date   • Acute metabolic encephalopathy 6/24/2022   • Anxiety    • Arthritis    • Benign essential hypertension 08/20/2014   • Bleeding disorder    • Depression    • Diabetes    • Diabetes mellitus, type 2    • Disc degeneration, lumbar    • Headache, tension-type    • Hyperlipidemia    • Hypothyroidism    • Neuropathy    • Osteoporosis 09/09/2015   • Peripheral neuropathy    • Rotator cuff tear, left    • Scoliosis    • Shoulder pain     LEFT, TORN ROTATOR CUFF S/P FALL   • Spinal stenosis      Past Surgical History:   Procedure Laterality Date   • APPENDECTOMY     • BILATERAL BREAST REDUCTION Bilateral 08/2015   • CATARACT EXTRACTION  03/2015   • CHOLECYSTECTOMY WITH INTRAOPERATIVE CHOLANGIOGRAM N/A 3/27/2022    Procedure: CHOLECYSTECTOMY LAPAROSCOPIC INTRAOPERATIVE CHOLANGIOGRAM;  Surgeon: Aiyana Hill MD;  Location: [facility] OR;  Service: General;  Laterality: N/A;   • COLONOSCOPY  06/05/2015    WNL   • ERCP N/A 2/25/2022    Procedure: ENDOSCOPIC RETROGRADE CHOLANGIOPANCREATOGRAPHY with sphincterotomy and balloon sweep;  Surgeon: Chilo Wilhelm MD;  Location: [facility] ENDOSCOPY;  Service: Gastroenterology;  Laterality: N/A;  PRE/POST - CBD stones   • ERCP N/A 3/28/2022    Procedure: ENDOSCOPIC RETROGRADE CHOLANGIOPANCREATOGRAPHY WITH SPHINCTEROTOMY AND BALLOON SWEEP;  Surgeon: Chilo Wilhelm MD;  Location: [facility] ENDOSCOPY;  Service: Gastroenterology;  Laterality: N/A;  PRE: COMMON DUCT STONE  POST: COMMON DUCT STONE   • KYPHOPLASTY     • REDUCTION MAMMAPLASTY     • TONSILLECTOMY        General Information     Row Name 05/15/23 1003          Physical Therapy Time and Intention    Document Type evaluation  -MG     Mode of Treatment co-treatment;physical therapy;occupational therapy  -MG     Row Name 05/15/23 1004          General Information    Patient Profile Reviewed yes  -MG     Prior Level of Function  independent:;all household mobility;ADL's  Uses RW. Owns a BSC, rollator, manual wc, shower chair and walk in shower. No recent falls, but feels she could fall to the side.  -MG     Existing Precautions/Restrictions fall;seizures  -MG     Barriers to Rehab previous functional deficit  -MG     Row Name 05/15/23 1004          Living Environment    People in Home child(beatriz), adult  Son who works from home.  -MG     Row Name 05/15/23 1004          Home Main Entrance    Number of Stairs, Main Entrance none  Ramped entrance.  -MG     Row Name 05/15/23 1004          Stairs Within Home, Primary    Number of Stairs, Within Home, Primary none  -MG     Row Name 05/15/23 1004          Cognition    Orientation Status (Cognition) oriented x 3  -MG     Row Name 05/15/23 1004          Safety Issues, Functional Mobility    Impairments Affecting Function (Mobility) balance;endurance/activity tolerance;strength;pain  -MG     Comment, Safety Issues/Impairments (Mobility) Gait belt and non-skid socks donned. PT/OT co-tx due to anticipation of low activity tolerance, impaired pain tolerance and/or need for two skilled hands for safe functional mobility.  -MG           User Key  (r) = Recorded By, (t) = Taken By, (c) = Cosigned By    Initials Name Provider Type    MG Melani Moctezuma, PT Physical Therapist               Mobility     Row Name 05/15/23 1222          Bed Mobility    Comment, (Bed Mobility) Pt partially sitting EOB w/ CNA present on PT/OT arrival w/ plan to tsf to the bedside chair. Pt was close SBA to finish scooting to EOB.  -MG     Row Name 05/15/23 1222          Bed-Chair Transfer    Bed-Chair [place] (Transfers) minimum assist (75% patient effort);2 person assist;verbal cues;nonverbal cues (demo/gesture)  -MG     Assistive Device (Bed-Chair Transfers) walker, front-wheeled  -MG     Comment, (Bed-Chair Transfer) Pt performed step pivot to L to sit in bedside chair. Pt heavily flexed fwd onto RW. Slow, short steps and  assist given w/ RW advancement to L. Pt declined any forward ambulation due to L toe pain.  -MG     Row Name 05/15/23 1229          Sit-Stand Transfer    Sit-Stand [place] (Transfers) moderate assist (50% patient effort);2 person assist;verbal cues  -MG     Assistive Device (Sit-Stand Transfers) walker, front-wheeled  -MG     Comment, (Sit-Stand Transfer) STS x2 attempts. LUE assisted onto RW as pt has h/o L RTC tear. Difficulty pushing through LLE. Heavily flexed fwd onto RW with inability to push through BUEs for trunk extension.  -MG           User Key  (r) = Recorded By, (t) = Taken By, (c) = Cosigned By    Initials Name Provider Type    Melani Barbosa PT Physical Therapist               Obj/Interventions     Row Name 05/15/23 1247          Range of Motion Comprehensive    General Range of Motion bilateral lower extremity ROM WFL  -MG     Row Name 05/15/23 1247          Strength Comprehensive (MMT)    General Manual Muscle Testing (MMT) Assessment lower extremity strength deficits identified  -MG     Comment, General Manual Muscle Testing (MMT) Assessment Grossly 4-/5  -MG     Row Name 05/15/23 1247          Balance    Comment, Balance Sitting - SBA while EOB. Standing - Min/ModA x2 w/ RW. No overt LOB, but was unsteady with step pivot to chair.  -MG     Row Name 05/15/23 1247          Sensory Assessment (Somatosensory)    Sensory Assessment (Somatosensory) LE sensation intact  States N/T to feet.  -MG           User Key  (r) = Recorded By, (t) = Taken By, (c) = Cosigned By    Initials Name Provider Type    Melani Barbosa, PT Physical Therapist               Goals/Plan     Row Name 05/15/23 1252          Bed Mobility Goal 1 (PT)    Activity/Assistive Device (Bed Mobility Goal 1, PT) bed mobility activities, all  -MG     [place] Level/Cues Needed (Bed Mobility Goal 1, PT) independent;modified independence  -MG     Time Frame (Bed Mobility Goal 1, PT) 1 week  -MG     Row Name 05/15/23 1258           Transfer Goal 1 (PT)    Activity/Assistive Device (Transfer Goal 1, PT) transfers, all  -MG     [place] Level/Cues Needed (Transfer Goal 1, PT) standby assist;contact guard required  -MG     Time Frame (Transfer Goal 1, PT) 1 week  -MG     Row Name 05/15/23 1254          Gait Training Goal 1 (PT)    Activity/Assistive Device (Gait Training Goal 1, PT) gait (walking locomotion)  -MG     [place] Level (Gait Training Goal 1, PT) standby assist  -MG     Distance (Gait Training Goal 1, PT) 80  -MG     Time Frame (Gait Training Goal 1, PT) 1 week  -MG     Row Name 05/15/23 1252          Therapy Assessment/Plan (PT)    Planned Therapy Interventions (PT) balance training;bed mobility training;gait training;home exercise program;patient/family education;stretching;strengthening;stair training;neuromuscular re-education;ROM (range of motion);transfer training  -MG           User Key  (r) = Recorded By, (t) = Taken By, (c) = Cosigned By    Initials Name Provider Type    MG Melani Moctezuma, PT Physical Therapist               Clinical Impression     Row Name 05/15/23 1248          Pain    Pretreatment Pain Rating 5/10  -MG     Posttreatment Pain Rating 5/10  -MG     Pain Location - Side/Orientation Left  -MG     Pain Location - toe  -MG     Pre/Posttreatment Pain Comment States pain to L great toe that is worse with WBing.  -MG     Pain Intervention(s) Medication (See MAR);Ambulation/increased activity;Repositioned;Rest  -MG     Row Name 05/15/23 1240          Plan of Care Review    Plan of Care Reviewed With patient  -MG     Progress no change  -MG     Outcome Evaluation Pt is a 81 y/o F who presented to ED with c/o L great toe pain xfew days. Pt noted to have infection of a bunion. Imaging (-). Pt reports being independent at baseline with use of RW and assist as needed from her son who is living with her. Pt is currently SBA for bed mobility and sitting balance, ModA x2 for STS to RW and [place] x2 for step pivot to  bedside chair w/ RW. Pt was heavily flexed fwd onto the RW having difficulty pushing through BUEs for trunk extension and to offweight her LLE. Pt unsteady with step pivot over to bedside chair, but no overt LOB noted. Pt declined additional mobility or fwd ambulation due to fatigue and L great toe pain. Educated pt on activity rec of sitting UIC TID for at least one hour each, therapy POC/goals and DC rec. Pt currently presents with decreased strength, impaired balance, decreased endurance/activity tolerance and increased pain with mobility. Pt will benefit from skilled PT services during this admission to progress her functional mobility and decrease falls risk. Rec pt to DC to SNF as pt is requiring increased assistance for OOB mobility and unable to perform fwd ambulation.  -MG     Row Name 05/15/23 1248          Therapy Assessment/Plan (PT)    Rehab Potential (PT) good, to achieve stated therapy goals  -MG     Criteria for Skilled Interventions Met (PT) yes  -MG     Therapy Frequency (PT) 5 times/wk  -MG     Row Name 05/15/23 1248          Vital Signs    Pretreatment Heart Rate (beats/min) 60  -MG     Pre SpO2 (%) 97  -MG     O2 Delivery Pre Treatment room air  -MG     O2 Delivery Intra Treatment room air  -MG     O2 Delivery Post Treatment room air  -MG     Pre Patient Position Supine  -MG     Intra Patient Position Standing  -MG     Post Patient Position Sitting  -MG     Row Name 05/15/23 1248          Positioning and Restraints    Pre-Treatment Position in bed  -MG     Post Treatment Position chair  -MG     In Chair notified nsg;call light within reach;encouraged to call for assist;exit alarm on;waffle cushion;legs elevated;heels elevated;reclined  -MG           User Key  (r) = Recorded By, (t) = Taken By, (c) = Cosigned By    Initials Name Provider Type    MG Melani Moctezuma, PT Physical Therapist               Outcome Measures     Row Name 05/15/23 1254 05/15/23 0931       How much help from another person  do you currently need...    Turning from your back to your side while in flat bed without using bedrails? 4  -MG 4  -AW    Moving from lying on back to sitting on the side of a flat bed without bedrails? 3  -MG 4  -AW    Moving to and from a bed to a chair (including a wheelchair)? 3  -MG 3  -AW    Standing up from a chair using your arms (e.g., wheelchair, bedside chair)? 2  -MG 3  -AW    Climbing 3-5 steps with a railing? 2  -MG 3  -AW    To walk in hospital room? 3  -MG 4  -AW    AM-PAC 6 Clicks Score (PT) 17  -MG 21  -AW    Highest level of mobility 5 --> Static standing  -MG 6 --> Walked 10 steps or more  -AW          User Key  (r) = Recorded By, (t) = Taken By, (c) = Cosigned By    Initials Name Provider Type    MG Melani Moctezuma, PT Physical Therapist    Marion Chambers RN Registered Nurse                             Physical Therapy Education     Title: PT OT SLP Therapies (In Progress)     Topic: Physical Therapy (In Progress)     Point: Mobility training (Done)     Learning Progress Summary           Patient Acceptance, E,D, VU,NR by  at 5/15/2023 1255                   Point: Home exercise program (Not Started)     Learner Progress:  Not documented in this visit.          Point: Body mechanics (Done)     Learning Progress Summary           Patient Acceptance, E,D, VU,NR by  at 5/15/2023 1255                   Point: Precautions (Done)     Learning Progress Summary           Patient Acceptance, E,D, VU,NR by  at 5/15/2023 1255                               User Key     Initials Effective Dates Name Provider Type Discipline     05/24/22 -  Melani Moctezuma, PT Physical Therapist PT              PT Recommendation and Plan  Planned Therapy Interventions (PT): balance training, bed mobility training, gait training, home exercise program, patient/family education, stretching, strengthening, stair training, neuromuscular re-education, ROM (range of motion), transfer training  Plan of Care Reviewed With:  patient  Progress: no change  Outcome Evaluation: Pt is a 83 y/o F who presented to ED with c/o L great toe pain xfew days. Pt noted to have infection of a bunion. Imaging (-). Pt reports being independent at baseline with use of RW and assist as needed from her son who is living with her. Pt is currently SBA for bed mobility and sitting balance, ModA x2 for STS to RW and [place] x2 for step pivot to bedside chair w/ RW. Pt was heavily flexed fwd onto the RW having difficulty pushing through BUEs for trunk extension and to offweight her LLE. Pt unsteady with step pivot over to bedside chair, but no overt LOB noted. Pt declined additional mobility or fwd ambulation due to fatigue and L great toe pain. Educated pt on activity rec of sitting UIC TID for at least one hour each, therapy POC/goals and DC rec. Pt currently presents with decreased strength, impaired balance, decreased endurance/activity tolerance and increased pain with mobility. Pt will benefit from skilled PT services during this admission to progress her functional mobility and decrease falls risk. Rec pt to DC to SNF as pt is requiring increased assistance for OOB mobility and unable to perform fwd ambulation.     Time Calculation:    PT Charges     Row Name 05/15/23 1255             Time Calculation    Start Time 0955  -MG      Stop Time 1015  -MG      Time Calculation (min) 20 min  -MG      PT Received On 05/15/23  -MG      PT - Next Appointment 05/16/23  -MG      PT Goal Re-Cert Due Date 05/22/23  -MG         Time Calculation- PT    Total Timed Code Minutes- PT 15 minute(s)  -MG            User Key  (r) = Recorded By, (t) = Taken By, (c) = Cosigned By    Initials Name Provider Type    MG Melani Moctezuma, PT Physical Therapist              Therapy Charges for Today     Code Description Service Date Service Provider Modifiers Qty    63619090582 HC PT EVAL MOD COMPLEXITY 3 5/15/2023 Melani Moctezuma, PT GP 1    17211328699 HC PT THERAPEUTIC ACT EA 15 MIN  5/15/2023 Melani Moctezuma, PT GP 1          PT G-Codes  AM-PAC 6 Clicks Score (PT): 17  PT Discharge Summary  Anticipated Discharge Disposition (PT): skilled nursing facility    Melani Mcotezuma, PT  5/15/2023

## 2023-05-16 ENCOUNTER — TRANSITIONAL CARE MANAGEMENT TELEPHONE ENCOUNTER (OUTPATIENT)
Dept: CALL CENTER | Facility: HOSPITAL | Age: 83
End: 2023-05-16
Payer: MEDICARE

## 2023-05-16 NOTE — OUTREACH NOTE
Prep Survey    Flowsheet Row Responses   [facility] facility patient discharged from? [place]   Is LACE score < 7 ? No   Eligibility [facility]   Date of Admission 05/12/23   Date of Discharge 05/15/23   Discharge Disposition Home-Health Care Svc   Discharge diagnosis acute left foot pain, UTI   Does the patient have one of the following disease processes/diagnoses(primary or secondary)? Other   Does the patient have Home health ordered? Yes   What is the Home health agency?  [facility]   Is there a DME ordered? No   Prep survey completed? Yes          Kathy BREWSTER - Registered Nurse

## 2023-05-16 NOTE — OUTREACH NOTE
Call Center TCM Note    Flowsheet Row Responses   [facility] patient discharged from? [place]   Does the patient have one of the following disease processes/diagnoses(primary or secondary)? Other   TCM attempt successful? No   Unsuccessful attempts Attempt 1   Call Status --  [MAILBOX FULL]          Kelsey Victor MA    5/16/2023, 09:17 EDT

## 2023-05-16 NOTE — OUTREACH NOTE
Call Center TCM Note    Flowsheet Row Responses   [facility] patient discharged from? [place]   Does the patient have one of the following disease processes/diagnoses(primary or secondary)? Other   TCM attempt successful? Yes   Call start time 1602   Call end time 1604   Discharge diagnosis LE cellulitis   Meds reviewed with patient/caregiver? Yes   Does the patient have all medications ordered at discharge? N/A   Does the patient have an appointment with their PCP within 7 days of discharge? Yes   Has home health visited the patient within 72 hours of discharge? Yes   Home health comments [facility] starts tomorrow   Psychosocial issues? No   Did the patient receive a copy of their discharge instructions? Yes   Nursing interventions Reviewed instructions with patient   What is the patient's perception of their health status since discharge? Same   Is the patient/caregiver able to teach back signs and symptoms related to disease process for when to call PCP? Yes   Is the patient/caregiver able to teach back signs and symptoms related to disease process for when to call 911? Yes   Is the patient/caregiver able to teach back the hierarchy of who to call/visit for symptoms/problems? PCP, Specialist, Home health nurse, Urgent Care, ED, 911 Yes   If the patient is a current smoker, are they able to teach back resources for cessation? Not a smoker   TCM call completed? Yes   Wrap up additional comments D/C DX:LE cellulitis - pt doing ok, maybe a bit better. No changes to current medications at d/c. [facility]  will see pt tomorrow. TCM APPT with PCP Dr Mead is 05/18/2023   Call end time 1604          Kelsey Victor MA    5/16/2023, 16:06 EDT

## 2023-05-17 VITALS — BODY MASS INDEX: 24.67 KG/M2 | HEIGHT: 70 IN

## 2023-05-17 RX ORDER — DOXAZOSIN MESYLATE 1 MG/1
TABLET ORAL
COMMUNITY
Start: 2023-04-17

## 2023-05-18 ENCOUNTER — OFFICE VISIT (OUTPATIENT)
Dept: FAMILY MEDICINE CLINIC | Facility: CLINIC | Age: 83
End: 2023-05-18
Payer: MEDICARE

## 2023-05-18 LAB
BACTERIA SPEC AEROBE CULT: NORMAL
BACTERIA SPEC AEROBE CULT: NORMAL

## 2023-05-19 ENCOUNTER — TELEPHONE (OUTPATIENT)
Dept: FAMILY MEDICINE CLINIC | Facility: CLINIC | Age: 83
End: 2023-05-19
Payer: MEDICARE

## 2023-05-19 DIAGNOSIS — M21.612 BUNION OF GREAT TOE OF LEFT FOOT: Primary | ICD-10-CM

## 2023-05-19 NOTE — TELEPHONE ENCOUNTER
Caller: ALLY    Relationship to patient: [facility] call back number: [phone]    Patient is needing: SHE NEEDS A REFERRAL TO A PODIATRIST.  THE WOUND ON HER LEFT FOOT IS LOOKING PRETTY BAD.  SHE IS GOING TO SEE HER ONCE MORE AFTER HER VISIT WITH YOU TO ASSESS IT.

## 2023-05-25 ENCOUNTER — OFFICE VISIT (OUTPATIENT)
Dept: FAMILY MEDICINE CLINIC | Facility: CLINIC | Age: 83
End: 2023-05-25
Payer: MEDICARE

## 2023-05-25 ENCOUNTER — READMISSION MANAGEMENT (OUTPATIENT)
Dept: CALL CENTER | Facility: HOSPITAL | Age: 83
End: 2023-05-25
Payer: MEDICARE

## 2023-05-25 VITALS
WEIGHT: 170 LBS | DIASTOLIC BLOOD PRESSURE: 71 MMHG | BODY MASS INDEX: 25.76 KG/M2 | RESPIRATION RATE: 16 BRPM | HEART RATE: 81 BPM | HEIGHT: 68 IN | OXYGEN SATURATION: 97 % | TEMPERATURE: 97.8 F | SYSTOLIC BLOOD PRESSURE: 153 MMHG

## 2023-05-25 DIAGNOSIS — Z09 HOSPITAL DISCHARGE FOLLOW-UP: ICD-10-CM

## 2023-05-25 DIAGNOSIS — M79.672 ACUTE PAIN OF LEFT FOOT: Primary | ICD-10-CM

## 2023-05-25 DIAGNOSIS — N17.9 AKI (ACUTE KIDNEY INJURY): ICD-10-CM

## 2023-05-25 DIAGNOSIS — L03.116 CELLULITIS OF LEFT LOWER EXTREMITY: ICD-10-CM

## 2023-05-25 DIAGNOSIS — L97.529 ULCER OF LEFT FOOT, UNSPECIFIED ULCER STAGE: ICD-10-CM

## 2023-05-25 RX ORDER — AMOXICILLIN AND CLAVULANATE POTASSIUM 875; 125 MG/1; MG/1
1 TABLET, FILM COATED ORAL EVERY 12 HOURS SCHEDULED
Qty: 20 TABLET | Refills: 0 | Status: SHIPPED | OUTPATIENT
Start: 2023-05-25

## 2023-05-25 NOTE — OUTREACH NOTE
Medical Week 2 Survey    Flowsheet Row Responses   [facility] patient discharged from? [place]   Does the patient have one of the following disease processes/diagnoses(primary or secondary)? Other   Week 2 attempt successful? No   Unsuccessful attempts Attempt 1          SHELLY THORNTON - Registered Nurse

## 2023-05-25 NOTE — PROGRESS NOTES
Transitional Care Follow Up Visit  Subjective     Halie Guidry is a 82 y.o. female who presents for a transitional care management visit.    Within 48 business hours after discharge our office contacted her via telephone to coordinate her care and needs.      I reviewed and discussed the details of that call along with the discharge summary, hospital problems, inpatient lab results, inpatient diagnostic studies, and consultation reports with Halie.     Current outpatient and discharge medications have been reconciled for the patient.  Reviewed by: Napoleon Mead MD          5/15/2023     8:50 PM   Date of TCM Phone Call   [facility]   Date of Admission 5/12/2023   Date of Discharge 5/15/2023   Discharge Disposition [facility]     Risk for Readmission (LACE) Score: 12 (5/15/2023  6:00 AM)      History of Present Illness   Course During Hospital Stay:      Date of Admission: 5/12/2023  Date of Discharge:  5/15/2023  Primary Care Physician: Napoleon Mead MD      Discharge Diagnosis:        Active Hospital Problems     Diagnosis   POA   • **Acute pain of left foot [M79.672]   Unknown   • Acute cystitis without hematuria [N30.00]   Yes   • History of Clostridium difficile infection [Z86.19]   Yes   • VIPUL (acute kidney injury) [N17.9]   Unknown   • 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 [E13.43]   Yes   • Primary hypothyroidism [E03.9]   Yes   • Benign essential hypertension [I10]   Yes   • Stage 3a chronic kidney disease [N18.31]   Yes       Resolved Hospital Problems   No resolved problems to display.         DETAILS OF HOSPITAL STAY      Pertinent Test Results and Procedures Performed     X-ray of the left foot showed no acute osseous finding     Chest x-ray showed no acute infiltrate, consolidation, or effusion     Hospital Course  This is an 82-year-old female with history of hypertension, chronic kidney disease and history of UTIs who presented to the emergency room  with complaints of left great toe pain associated with a bunion present there.  Please see H&P for full details.  She is also suspected to have a UTI upon presentation.  There was no erythema, swelling, or drainage around her great toe.  She denied any fevers.  She was admitted and started on Rocephin for potential UTI and lower extremity cellulitis.  X-ray of the left foot did not show any acute osseous abnormality.  Attempted to obtain MRI of this area but the patient could not tolerate laying still for that long due to back pain and some claustrophobia.  Serial exams of the toe show no change and suspicion for infection in this area is extremely low.  I do think she may have had a lower extremity cellulitis which was mild and improved at this point through the use of Rocephin.  Her urine culture ended up coming back negative.  She completed a 3-day course of antibiotics.  She had an acute kidney injury upon presentation which resolved with IV fluids.  She is evaluated by physical therapy who recommended SNF however the patient has declined and request discharge home today.  She is medically stable and will be released.  I recommended that she see a podiatrist in the outpatient setting for her foot.    Current outpatient and discharge medications have been reconciled for the patient.  Reviewed by: Napoleon Mead MD     Patient is currently receiving home health through [facility] and they contacted us on 519 for a referral to podiatry due to her wound infection.  This was placed at that point.        The following portions of the patient's history were reviewed and updated as appropriate: allergies, current medications, past family history, past medical history, past social history, past surgical history and problem list.    Review of Systems   Constitutional: Negative for activity change, chills and fever.   Respiratory: Negative for cough.    Cardiovascular: Negative for chest pain.   Psychiatric/Behavioral:  "Negative for dysphoric mood.       /71 (BP Location: Right arm, Patient Position: Sitting)   Pulse 81   Temp 97.8 °F (36.6 °C) (Oral)   Resp 16   Ht 172.7 cm (68\")   Wt 77.1 kg (170 lb)   SpO2 97%   BMI 25.85 kg/m²     Objective   Physical Exam  Constitutional:       General: She is not in acute distress.     Appearance: She is well-developed.   Cardiovascular:      Rate and Rhythm: Normal rate and regular rhythm.   Pulmonary:      Effort: Pulmonary effort is normal.      Breath sounds: Normal breath sounds.   Neurological:      Mental Status: She is alert and oriented to person, place, and time.   Psychiatric:         Behavior: Behavior normal.         Thought Content: Thought content normal.     Hospital records reviewed with pt confirming HPI.                  Assessment & Plan   Diagnoses and all orders for this visit:    1. Acute pain of left foot (Primary)  -     Ambulatory Referral to Wound Clinic    2. VIPUL (acute kidney injury)    3. Hospital discharge follow-up    4. Ulcer of left foot, unspecified ulcer stage  -     Ambulatory Referral to Wound Clinic    5. Cellulitis of left lower extremity  -     amoxicillin-clavulanate (AUGMENTIN) 875-125 MG per tablet; Take 1 tablet by mouth Every 12 (Twelve) Hours.  Dispense: 20 tablet; Refill: 0    Patient to keep close follow-up with podiatry and continue home health as ordered.  Patient also sees endocrinology for her diabetes and is due an appointment, so do recommend that she contact them to schedule that.             "

## 2023-06-01 ENCOUNTER — READMISSION MANAGEMENT (OUTPATIENT)
Dept: CALL CENTER | Facility: HOSPITAL | Age: 83
End: 2023-06-01

## 2023-06-01 NOTE — OUTREACH NOTE
Medical Week 3 Survey    Flowsheet Row Responses   [facility] patient discharged from? [place]   Does the patient have one of the following disease processes/diagnoses(primary or secondary)? Other   Week 3 attempt successful? Yes   Call start time 1609   Call end time 1610   Discharge diagnosis LE cellulitis   Is the patient taking all medications as directed (includes completed medication regime)? Yes   Comments regarding appointments will see wound care tomorrow (6/2)   Does the patient have a primary care provider?  Yes   Comments regarding PCP seen PCP on 5/25   Has the patient kept scheduled appointments due by today? Yes   What is the Home health agency?  [facility]    [facility]   Psychosocial issues? No   What is the patient's perception of their health status since discharge? Improving   Is the patient/caregiver able to teach back the hierarchy of who to call/visit for symptoms/problems? PCP, Specialist, Home health nurse, Urgent Care, ED, 911 Yes   Week 3 Call Completed? Yes   Graduated Yes   Is the patient interested in additional calls from an ambulatory ?  NOTE:  applies to high risk patients requiring additional follow-up. No   Graduated/Revoked comments Doing well, no questions,          Cristal ELDER - Registered Nurse

## 2023-06-02 ENCOUNTER — OFFICE VISIT (OUTPATIENT)
Dept: ENDOCRINOLOGY | Age: 83
End: 2023-06-02

## 2023-06-02 VITALS
TEMPERATURE: 97.3 F | OXYGEN SATURATION: 97 % | BODY MASS INDEX: 25.79 KG/M2 | WEIGHT: 170.2 LBS | SYSTOLIC BLOOD PRESSURE: 130 MMHG | HEART RATE: 72 BPM | HEIGHT: 68 IN | DIASTOLIC BLOOD PRESSURE: 70 MMHG

## 2023-06-02 DIAGNOSIS — N18.31 HYPERTENSIVE KIDNEY DISEASE WITH STAGE 3A CHRONIC KIDNEY DISEASE: ICD-10-CM

## 2023-06-02 DIAGNOSIS — L03.116 CELLULITIS OF LEFT LOWER EXTREMITY: ICD-10-CM

## 2023-06-02 DIAGNOSIS — I12.9 HYPERTENSIVE KIDNEY DISEASE WITH STAGE 3A CHRONIC KIDNEY DISEASE: ICD-10-CM

## 2023-06-02 DIAGNOSIS — E78.5 HYPERLIPIDEMIA, UNSPECIFIED HYPERLIPIDEMIA TYPE: ICD-10-CM

## 2023-06-02 DIAGNOSIS — Z79.4 TYPE 2 DIABETES MELLITUS WITH HYPERGLYCEMIA, WITH LONG-TERM CURRENT USE OF INSULIN: Primary | ICD-10-CM

## 2023-06-02 DIAGNOSIS — E03.9 PRIMARY HYPOTHYROIDISM: ICD-10-CM

## 2023-06-02 DIAGNOSIS — E11.65 TYPE 2 DIABETES MELLITUS WITH HYPERGLYCEMIA, WITH LONG-TERM CURRENT USE OF INSULIN: Primary | ICD-10-CM

## 2023-06-02 RX ORDER — AMOXICILLIN AND CLAVULANATE POTASSIUM 875; 125 MG/1; MG/1
1 TABLET, FILM COATED ORAL EVERY 12 HOURS SCHEDULED
Qty: 20 TABLET | Refills: 0 | Status: SHIPPED | OUTPATIENT
Start: 2023-06-02

## 2023-06-02 NOTE — PATIENT INSTRUCTIONS
Increase  Toujeo 75 units daily  Increase Novolog 14 units plus 2 units per 50 over 150 before meals  Continue with Trulicity 1.5 mg weekly  Check blood sugars in the morning when you get up, before lunch and before supper    Download Dexcom G7 and Dexcom Clarity

## 2023-06-02 NOTE — PROGRESS NOTES
Chief Complaint  Chief Complaint   Patient presents with   • Diabetes     Type 2: Pt doesn't have meter, checks bs twice a day, is up to date on eye exam, no hx of retinopathy or neuropathy. Pt states that she has been having some issues with her feet. Pt states that she is interested in [facility].        Subjective          History of Present Illness    Halie Guidry 82 y.o. presents for a follow-up evaluation for type 2 DM    She has been diabetic for more that 10 years    Pt is on the following medications for their DM: Toujeo 65 units daily, Novolog 9 units plus 2 units per 50 over 150 before meals and Trulicity 1.5 mg weekly      Pt complains of tingling in feet/hands    Denies diarrhea, constipation, chest pain, shortness of breath and vision changes.    Last eye exam was was years ago    Pt does have a history of nephropathy.  Patient is currently taking ACE - follows with Nephrology    Pt does have neuropathy.  Current takes Lyrica 50 mg TID for this condition per PCP    Pt does not have a history of CAD or CVA.    Last A1C in 09/22 was 7.70          Blood Sugars    Blood glucoses are checked 2/day.    Fasting blood glucoses: 220s - 400s    Pre-supper blood glucoses: 220s - 400s    Pt has no episodes of hypoglycemia.            Hypothyroid    PT complains of hair loss, dry skin and cold intolerance.    Denies constipation, diarrhea and chest pain, palpitations, heat intolerance    Current treatment is levothyroxine 112 mcg daily    Last labs in 09/22 showed TSH 4.550              Hyperlipidemia     Pt is currently taking atorvastatin 80 mg HS    Last lipid panel in 06/22 showed Total 120, HDL 34, LDL 63 and Triglycerides 127            Hypertension with CKD Stage 3a    Pt denies any chest pain, palpitations, shortness of breath or headache    Current regimen includes amlodipine 5 mg daily, bisoprolol 5 mg daily, chlorthalidone 25 mg daily, doxazosin 1 mg daily and lisinopril 40 mg daily            Other  "History    Wound on left foot - going to see wound care          I have reviewed the patient's allergies, medicines, past medical hx, family hx and social hx.    Objective   Vital Signs:   /70   Pulse 72   Temp 97.3 °F (36.3 °C) (Temporal)   Ht 172.7 cm (67.99\")   Wt 77.2 kg (170 lb 3.2 oz)   SpO2 97%   BMI 25.88 kg/m²       Physical Exam   Physical Exam  Constitutional:       General: She is not in acute distress.     Appearance: Normal appearance. She is not diaphoretic.   HENT:      Head: Normocephalic and atraumatic.   Eyes:      General:         Right eye: No discharge.         Left eye: No discharge.   Skin:     General: Skin is warm and dry.   Neurological:      Mental Status: She is alert.   Psychiatric:         Mood and Affect: Mood normal.         Behavior: Behavior normal.                    Results Review:   Hemoglobin A1C   Date Value Ref Range Status   09/01/2022 7.70 (H) 4.80 - 5.60 % Final     Total Cholesterol   Date Value Ref Range Status   06/23/2022 120 0 - 200 mg/dL Final     Triglycerides   Date Value Ref Range Status   06/23/2022 127 0 - 150 mg/dL Final     HDL Cholesterol   Date Value Ref Range Status   06/23/2022 34 (L) 40 - 60 mg/dL Final     LDL Cholesterol    Date Value Ref Range Status   06/23/2022 63 0 - 100 mg/dL Final     LDL Chol Calc (NIH)   Date Value Ref Range Status   06/08/2021 68 0 - 100 mg/dL Final     VLDL Cholesterol   Date Value Ref Range Status   06/23/2022 23 5 - 40 mg/dL Final     VLDL Cholesterol Gerald   Date Value Ref Range Status   06/08/2021 24 5 - 40 mg/dL Final     LDL/HDL Ratio   Date Value Ref Range Status   06/23/2022 1.78  Final         Assessment and Plan {CC Problem List  Visit Diagnosis  ROS  Review (Popup)  Health Maintenance  Quality  BestPractice  Medications  SmartSets  SnapShot Encounters  Media :23  Diagnoses and all orders for this visit:    1. Type 2 diabetes mellitus with hyperglycemia, with long-term current use of insulin " (Primary)  -     Hemoglobin A1c  -     Comprehensive Metabolic Panel    Increase  Toujeo 75 units daily  Increase Novolog 14 units plus 2 units per 50 over 150 before meals  Continue with Trulicity 1.5 mg weekly  Check blood sugars in the morning when you get up, before lunch and before supper  Would like GCM  Check labs        2. Primary hypothyroidism  -     TSH  -     T4, Free    Check labs today  Continue with levothyroxine 112 mcg daily - states not missing doses      3. Hyperlipidemia, unspecified hyperlipidemia type  -     Comprehensive Metabolic Panel  -     Lipid Panel    Check labs today  Continue with statin        4. Hypertensive kidney disease with stage 3a chronic kidney disease  -     Comprehensive Metabolic Panel    Stable  Continue with current medication regimen  Defer management to PCP/Nephrology            Labs today  RTC in 2 months with me      Follow Up     Patient was given instructions and counseling regarding her condition or for health maintenance advice. Please see specific information pulled into the AVS if appropriate.              Lilia Orona, SERGE  06/02/23

## 2023-06-02 NOTE — TELEPHONE ENCOUNTER
Caller: Alisia Guidrygopal LIMA    Relationship: Self    Best call back number: [phone]    Requested Prescriptions:   Requested Prescriptions     Pending Prescriptions Disp Refills   • amoxicillin-clavulanate (AUGMENTIN) 875-125 MG per tablet 20 tablet 0     Sig: Take 1 tablet by mouth Every 12 (Twelve) Hours.        Pharmacy where request should be sent: [id number] [address] [place] [address]  AT [facility] [phone] [facility] [phone]      Last office visit with prescribing clinician: 5/25/2023   Last telemedicine visit with prescribing clinician: 3/1/2023   Next office visit with prescribing clinician: Visit date not found     Additional details provided by patient: PATIENT STATES SHE STILL HAS THE WOUND ON HER LEFT FOOT. PATIENT STATES SHE WAS UNABLE TO KEEP HER WOUND APPOINTMENT FOR TODAY, 06/02/23 DUE TO CONSTRUCTION AND IS RESCHEDULED FOR 06/12/23.     PATIENT STATES SHE HAS ONE LEFT.     Does the patient have less than a 3 day supply:  [x] Yes  [] No    Would you like a call back once the refill request has been completed: [x] Yes [] No    If the office needs to give you a call back, can they leave a voicemail: [x] Yes [] No    Jaylen Contreras   06/02/23 11:58 EDT

## 2023-06-03 LAB
ALBUMIN SERPL-MCNC: 4 G/DL (ref 3.5–5.2)
ALBUMIN/GLOB SERPL: 1.3 G/DL
ALP SERPL-CCNC: 110 U/L (ref 39–117)
ALT SERPL-CCNC: 5 U/L (ref 1–33)
AST SERPL-CCNC: 12 U/L (ref 1–32)
BILIRUB SERPL-MCNC: 0.3 MG/DL (ref 0–1.2)
BUN SERPL-MCNC: 28 MG/DL (ref 8–23)
BUN/CREAT SERPL: 22 (ref 7–25)
CALCIUM SERPL-MCNC: 9.2 MG/DL (ref 8.6–10.5)
CHLORIDE SERPL-SCNC: 103 MMOL/L (ref 98–107)
CHOLEST SERPL-MCNC: 138 MG/DL (ref 0–200)
CO2 SERPL-SCNC: 22.3 MMOL/L (ref 22–29)
CREAT SERPL-MCNC: 1.27 MG/DL (ref 0.57–1)
EGFRCR SERPLBLD CKD-EPI 2021: 42.3 ML/MIN/1.73
GLOBULIN SER CALC-MCNC: 3.2 GM/DL
GLUCOSE SERPL-MCNC: 236 MG/DL (ref 65–99)
HBA1C MFR BLD: 11.8 % (ref 4.8–5.6)
HDLC SERPL-MCNC: 33 MG/DL (ref 40–60)
IMP & REVIEW OF LAB RESULTS: NORMAL
LDLC SERPL CALC-MCNC: 82 MG/DL (ref 0–100)
POTASSIUM SERPL-SCNC: 4.9 MMOL/L (ref 3.5–5.2)
PROT SERPL-MCNC: 7.2 G/DL (ref 6–8.5)
REPORT: NORMAL
SODIUM SERPL-SCNC: 139 MMOL/L (ref 136–145)
T4 FREE SERPL-MCNC: 1.3 NG/DL (ref 0.93–1.7)
TRIGL SERPL-MCNC: 130 MG/DL (ref 0–150)
TSH SERPL DL<=0.005 MIU/L-ACNC: 5.54 UIU/ML (ref 0.27–4.2)
VLDLC SERPL CALC-MCNC: 23 MG/DL (ref 5–40)

## 2023-06-06 ENCOUNTER — TELEPHONE (OUTPATIENT)
Dept: FAMILY MEDICINE CLINIC | Facility: CLINIC | Age: 83
End: 2023-06-06

## 2023-06-06 NOTE — TELEPHONE ENCOUNTER
Caller: [facility] - [place], [place] - [address] [facility] [address] [facility] [phone] [facility] [phone]     Relationship: Pharmacy    Best call back number: [phone]   REFERENCE NUMBER: [id number]    What was the call regarding: SHAHRIAR IS CALLING TO VERIFY THE STRENGTH ON THE amLODIPine (NORVASC) . SHE IS NOT SURE IF THE PATIENT IS ON THE 5MG OR 10MG. SHAHRIAR WOULD LIKE FOR SOMEONE TO GIVE HER A CALL BACK    PLEASE CALL AND ADVISE

## 2023-06-07 ENCOUNTER — TELEPHONE (OUTPATIENT)
Dept: FAMILY MEDICINE CLINIC | Facility: CLINIC | Age: 83
End: 2023-06-07

## 2023-06-07 NOTE — TELEPHONE ENCOUNTER
Caller: YEYO    Relationship: [facility]    Best call back number: [phone]    What is the best time to reach you: ANY    Who are you requesting to speak with (clinical staff, provider,  specific staff member):     Do you know the name of the person who called: YEYO    What was the call regarding: SHE FAXED OVER A FORM ON 6/5/23 AND WOULD LIKE TO KNOW IF YOU'VE RECEIVED IT.  PLEASE CALL TO CONFIRM    Is it okay if the provider responds through MyChart: NO

## 2023-06-08 ENCOUNTER — PRIOR AUTHORIZATION (OUTPATIENT)
Dept: ENDOCRINOLOGY | Age: 83
End: 2023-06-08
Payer: MEDICARE

## 2023-06-08 NOTE — TELEPHONE ENCOUNTER
6/8/2023    PA has been initiated in Covermymeds for the Dexcom G7  Device and all information has been submitted over to the patient insurance company for further review. It is currently still pending a response back with an determination from the plan.    Key: [id number] - PA Case ID: [id number]

## 2023-06-09 ENCOUNTER — TELEPHONE (OUTPATIENT)
Dept: ENDOCRINOLOGY | Age: 83
End: 2023-06-09
Payer: MEDICARE

## 2023-06-09 NOTE — TELEPHONE ENCOUNTER
Caller: [facility]    Relationship to patient: PHARMACY    Best call back number: [phone]    [facility] WITH [facility] HAS SOME QUESTIONS FOR NELI NATARAJAN REGARDING THE DEXCOM G7 FOR THE PATIENT.     [employer] WANTS TO CONFIRM THAT THE PATIENT HAS HAD A VISIT DISCUSSING THE GCM WITHIN THE PAST 6 MONTHS. THEY ALSO WANT TO CONFIRM THAT THERE WILL BE A FOLLOW  UP EVERY SIX MONTHS TO ASSESS ADHERENCE.     PLEASE CALL [facility] TO ANSWER THESE QUESTIONS.

## 2023-06-12 ENCOUNTER — OFFICE VISIT (OUTPATIENT)
Dept: WOUND CARE | Facility: HOSPITAL | Age: 83
End: 2023-06-12
Payer: MEDICARE

## 2023-06-12 DIAGNOSIS — E03.9 PRIMARY HYPOTHYROIDISM: ICD-10-CM

## 2023-06-12 PROCEDURE — 97602 WOUND(S) CARE NON-SELECTIVE: CPT

## 2023-06-12 PROCEDURE — G0463 HOSPITAL OUTPT CLINIC VISIT: HCPCS

## 2023-06-12 RX ORDER — LEVOTHYROXINE SODIUM 112 UG/1
TABLET ORAL
Qty: 94 TABLET | Refills: 1
Start: 2023-06-12

## 2023-07-21 ENCOUNTER — APPOINTMENT (OUTPATIENT)
Dept: GENERAL RADIOLOGY | Facility: HOSPITAL | Age: 83
End: 2023-07-21
Payer: MEDICARE

## 2023-07-21 ENCOUNTER — HOSPITAL ENCOUNTER (OUTPATIENT)
Facility: HOSPITAL | Age: 83
LOS: 4 days | Discharge: NURSING, REHAB OR LONG-TERM CARE | End: 2023-07-30
Attending: EMERGENCY MEDICINE | Admitting: INTERNAL MEDICINE
Payer: MEDICARE

## 2023-07-21 DIAGNOSIS — M79.605 BILATERAL LEG PAIN: Primary | ICD-10-CM

## 2023-07-21 DIAGNOSIS — R26.2 UNABLE TO AMBULATE: ICD-10-CM

## 2023-07-21 DIAGNOSIS — R60.9 PERIPHERAL EDEMA: ICD-10-CM

## 2023-07-21 DIAGNOSIS — M51.36 LUMBAR DEGENERATIVE DISC DISEASE: ICD-10-CM

## 2023-07-21 DIAGNOSIS — G62.9 NEUROPATHY: ICD-10-CM

## 2023-07-21 DIAGNOSIS — M79.604 BILATERAL LEG PAIN: Primary | ICD-10-CM

## 2023-07-21 LAB
ALBUMIN SERPL-MCNC: 3.9 G/DL (ref 3.5–5.2)
ALBUMIN/GLOB SERPL: 1.2 G/DL
ALP SERPL-CCNC: 109 U/L (ref 39–117)
ALT SERPL W P-5'-P-CCNC: 10 U/L (ref 1–33)
ANION GAP SERPL CALCULATED.3IONS-SCNC: 11.4 MMOL/L (ref 5–15)
AST SERPL-CCNC: 18 U/L (ref 1–32)
BASOPHILS # BLD AUTO: 0.07 10*3/MM3 (ref 0–0.2)
BASOPHILS NFR BLD AUTO: 0.8 % (ref 0–1.5)
BILIRUB SERPL-MCNC: 0.4 MG/DL (ref 0–1.2)
BUN SERPL-MCNC: 29 MG/DL (ref 8–23)
BUN/CREAT SERPL: 25.7 (ref 7–25)
CALCIUM SPEC-SCNC: 8.7 MG/DL (ref 8.6–10.5)
CHLORIDE SERPL-SCNC: 100 MMOL/L (ref 98–107)
CO2 SERPL-SCNC: 20.6 MMOL/L (ref 22–29)
CREAT SERPL-MCNC: 1.13 MG/DL (ref 0.57–1)
DEPRECATED RDW RBC AUTO: 41.7 FL (ref 37–54)
EGFRCR SERPLBLD CKD-EPI 2021: 48.4 ML/MIN/1.73
EOSINOPHIL # BLD AUTO: 0.18 10*3/MM3 (ref 0–0.4)
EOSINOPHIL NFR BLD AUTO: 2 % (ref 0.3–6.2)
ERYTHROCYTE [DISTWIDTH] IN BLOOD BY AUTOMATED COUNT: 13.7 % (ref 12.3–15.4)
GLOBULIN UR ELPH-MCNC: 3.2 GM/DL
GLUCOSE BLDC GLUCOMTR-MCNC: 345 MG/DL (ref 70–130)
GLUCOSE SERPL-MCNC: 336 MG/DL (ref 65–99)
HCT VFR BLD AUTO: 34.7 % (ref 34–46.6)
HGB BLD-MCNC: 11.4 G/DL (ref 12–15.9)
IMM GRANULOCYTES # BLD AUTO: 0.06 10*3/MM3 (ref 0–0.05)
IMM GRANULOCYTES NFR BLD AUTO: 0.7 % (ref 0–0.5)
LYMPHOCYTES # BLD AUTO: 1.4 10*3/MM3 (ref 0.7–3.1)
LYMPHOCYTES NFR BLD AUTO: 15.5 % (ref 19.6–45.3)
MCH RBC QN AUTO: 27.9 PG (ref 26.6–33)
MCHC RBC AUTO-ENTMCNC: 32.9 G/DL (ref 31.5–35.7)
MCV RBC AUTO: 84.8 FL (ref 79–97)
MONOCYTES # BLD AUTO: 0.62 10*3/MM3 (ref 0.1–0.9)
MONOCYTES NFR BLD AUTO: 6.9 % (ref 5–12)
NEUTROPHILS NFR BLD AUTO: 6.69 10*3/MM3 (ref 1.7–7)
NEUTROPHILS NFR BLD AUTO: 74.1 % (ref 42.7–76)
NRBC BLD AUTO-RTO: 0 /100 WBC (ref 0–0.2)
NT-PROBNP SERPL-MCNC: 320 PG/ML (ref 0–1800)
PLATELET # BLD AUTO: 308 10*3/MM3 (ref 140–450)
PMV BLD AUTO: 9.6 FL (ref 6–12)
POTASSIUM SERPL-SCNC: 4.3 MMOL/L (ref 3.5–5.2)
PROT SERPL-MCNC: 7.1 G/DL (ref 6–8.5)
QT INTERVAL: 461 MS
RBC # BLD AUTO: 4.09 10*6/MM3 (ref 3.77–5.28)
SODIUM SERPL-SCNC: 132 MMOL/L (ref 136–145)
WBC NRBC COR # BLD: 9.02 10*3/MM3 (ref 3.4–10.8)

## 2023-07-21 PROCEDURE — 82948 REAGENT STRIP/BLOOD GLUCOSE: CPT

## 2023-07-21 PROCEDURE — 85025 COMPLETE CBC W/AUTO DIFF WBC: CPT | Performed by: EMERGENCY MEDICINE

## 2023-07-21 PROCEDURE — G0378 HOSPITAL OBSERVATION PER HR: HCPCS

## 2023-07-21 PROCEDURE — 80053 COMPREHEN METABOLIC PANEL: CPT | Performed by: EMERGENCY MEDICINE

## 2023-07-21 PROCEDURE — 63710000001 INSULIN LISPRO (HUMAN) PER 5 UNITS: Performed by: NURSE PRACTITIONER

## 2023-07-21 PROCEDURE — 99284 EMERGENCY DEPT VISIT MOD MDM: CPT

## 2023-07-21 PROCEDURE — 83880 ASSAY OF NATRIURETIC PEPTIDE: CPT | Performed by: EMERGENCY MEDICINE

## 2023-07-21 PROCEDURE — 71045 X-RAY EXAM CHEST 1 VIEW: CPT

## 2023-07-21 PROCEDURE — 93010 ELECTROCARDIOGRAM REPORT: CPT | Performed by: INTERNAL MEDICINE

## 2023-07-21 PROCEDURE — 93005 ELECTROCARDIOGRAM TRACING: CPT | Performed by: EMERGENCY MEDICINE

## 2023-07-21 RX ORDER — PREGABALIN 50 MG/1
50 CAPSULE ORAL 3 TIMES DAILY
Status: DISCONTINUED | OUTPATIENT
Start: 2023-07-21 | End: 2023-07-31 | Stop reason: HOSPADM

## 2023-07-21 RX ORDER — LEVOTHYROXINE SODIUM 112 UG/1
112 TABLET ORAL
Status: DISCONTINUED | OUTPATIENT
Start: 2023-07-22 | End: 2023-07-31 | Stop reason: HOSPADM

## 2023-07-21 RX ORDER — CHOLECALCIFEROL (VITAMIN D3) 125 MCG
1000 CAPSULE ORAL DAILY
Status: DISCONTINUED | OUTPATIENT
Start: 2023-07-22 | End: 2023-07-31 | Stop reason: HOSPADM

## 2023-07-21 RX ORDER — IBUPROFEN 600 MG/1
1 TABLET ORAL
Status: DISCONTINUED | OUTPATIENT
Start: 2023-07-21 | End: 2023-07-31 | Stop reason: HOSPADM

## 2023-07-21 RX ORDER — INSULIN LISPRO 100 [IU]/ML
8 INJECTION, SOLUTION INTRAVENOUS; SUBCUTANEOUS
Status: DISCONTINUED | OUTPATIENT
Start: 2023-07-22 | End: 2023-07-23

## 2023-07-21 RX ORDER — CHLORTHALIDONE 25 MG/1
25 TABLET ORAL DAILY
Status: DISCONTINUED | OUTPATIENT
Start: 2023-07-22 | End: 2023-07-31 | Stop reason: HOSPADM

## 2023-07-21 RX ORDER — SODIUM CHLORIDE 0.9 % (FLUSH) 0.9 %
10 SYRINGE (ML) INJECTION AS NEEDED
Status: DISCONTINUED | OUTPATIENT
Start: 2023-07-21 | End: 2023-07-31 | Stop reason: HOSPADM

## 2023-07-21 RX ORDER — NICOTINE POLACRILEX 4 MG
15 LOZENGE BUCCAL
Status: DISCONTINUED | OUTPATIENT
Start: 2023-07-21 | End: 2023-07-31 | Stop reason: HOSPADM

## 2023-07-21 RX ORDER — PANTOPRAZOLE SODIUM 40 MG/1
40 TABLET, DELAYED RELEASE ORAL
Status: DISCONTINUED | OUTPATIENT
Start: 2023-07-22 | End: 2023-07-31 | Stop reason: HOSPADM

## 2023-07-21 RX ORDER — AMOXICILLIN 250 MG
2 CAPSULE ORAL 2 TIMES DAILY
Status: DISCONTINUED | OUTPATIENT
Start: 2023-07-21 | End: 2023-07-31 | Stop reason: HOSPADM

## 2023-07-21 RX ORDER — HYDROCODONE BITARTRATE AND ACETAMINOPHEN 5; 325 MG/1; MG/1
1 TABLET ORAL EVERY 6 HOURS PRN
Status: DISCONTINUED | OUTPATIENT
Start: 2023-07-21 | End: 2023-07-21

## 2023-07-21 RX ORDER — INSULIN LISPRO 100 [IU]/ML
3-14 INJECTION, SOLUTION INTRAVENOUS; SUBCUTANEOUS
Status: DISCONTINUED | OUTPATIENT
Start: 2023-07-21 | End: 2023-07-31 | Stop reason: HOSPADM

## 2023-07-21 RX ORDER — LISINOPRIL 40 MG/1
40 TABLET ORAL DAILY
Status: DISCONTINUED | OUTPATIENT
Start: 2023-07-22 | End: 2023-07-31 | Stop reason: HOSPADM

## 2023-07-21 RX ORDER — BISACODYL 10 MG
10 SUPPOSITORY, RECTAL RECTAL DAILY PRN
Status: DISCONTINUED | OUTPATIENT
Start: 2023-07-21 | End: 2023-07-31 | Stop reason: HOSPADM

## 2023-07-21 RX ORDER — BISACODYL 5 MG/1
5 TABLET, DELAYED RELEASE ORAL DAILY PRN
Status: DISCONTINUED | OUTPATIENT
Start: 2023-07-21 | End: 2023-07-31 | Stop reason: HOSPADM

## 2023-07-21 RX ORDER — SODIUM CHLORIDE 9 MG/ML
40 INJECTION, SOLUTION INTRAVENOUS AS NEEDED
Status: DISCONTINUED | OUTPATIENT
Start: 2023-07-21 | End: 2023-07-31 | Stop reason: HOSPADM

## 2023-07-21 RX ORDER — TERAZOSIN 1 MG/1
1 CAPSULE ORAL NIGHTLY
Status: DISCONTINUED | OUTPATIENT
Start: 2023-07-21 | End: 2023-07-31 | Stop reason: HOSPADM

## 2023-07-21 RX ORDER — SODIUM CHLORIDE 0.9 % (FLUSH) 0.9 %
10 SYRINGE (ML) INJECTION EVERY 12 HOURS SCHEDULED
Status: DISCONTINUED | OUTPATIENT
Start: 2023-07-21 | End: 2023-07-31 | Stop reason: HOSPADM

## 2023-07-21 RX ORDER — LEVOTHYROXINE SODIUM 112 UG/1
168 TABLET ORAL
Status: DISCONTINUED | OUTPATIENT
Start: 2023-07-23 | End: 2023-07-31 | Stop reason: HOSPADM

## 2023-07-21 RX ORDER — BISOPROLOL FUMARATE 5 MG/1
5 TABLET, FILM COATED ORAL DAILY
Status: DISCONTINUED | OUTPATIENT
Start: 2023-07-22 | End: 2023-07-31 | Stop reason: HOSPADM

## 2023-07-21 RX ORDER — DULOXETIN HYDROCHLORIDE 30 MG/1
30 CAPSULE, DELAYED RELEASE ORAL DAILY
Status: DISCONTINUED | OUTPATIENT
Start: 2023-07-22 | End: 2023-07-31 | Stop reason: HOSPADM

## 2023-07-21 RX ORDER — DEXTROSE MONOHYDRATE 25 G/50ML
25 INJECTION, SOLUTION INTRAVENOUS
Status: DISCONTINUED | OUTPATIENT
Start: 2023-07-21 | End: 2023-07-31 | Stop reason: HOSPADM

## 2023-07-21 RX ORDER — AMLODIPINE BESYLATE 5 MG/1
5 TABLET ORAL DAILY
Status: DISCONTINUED | OUTPATIENT
Start: 2023-07-22 | End: 2023-07-31 | Stop reason: HOSPADM

## 2023-07-21 RX ORDER — ATORVASTATIN CALCIUM 20 MG/1
80 TABLET, FILM COATED ORAL NIGHTLY
Status: DISCONTINUED | OUTPATIENT
Start: 2023-07-21 | End: 2023-07-31 | Stop reason: HOSPADM

## 2023-07-21 RX ORDER — POLYETHYLENE GLYCOL 3350 17 G/17G
17 POWDER, FOR SOLUTION ORAL DAILY PRN
Status: DISCONTINUED | OUTPATIENT
Start: 2023-07-21 | End: 2023-07-31 | Stop reason: HOSPADM

## 2023-07-21 RX ADMIN — INSULIN LISPRO 10 UNITS: 100 INJECTION, SOLUTION INTRAVENOUS; SUBCUTANEOUS at 23:30

## 2023-07-21 RX ADMIN — ATORVASTATIN CALCIUM 80 MG: 20 TABLET, FILM COATED ORAL at 23:30

## 2023-07-21 RX ADMIN — PREGABALIN 50 MG: 50 CAPSULE ORAL at 23:30

## 2023-07-21 RX ADMIN — Medication 10 ML: at 23:31

## 2023-07-21 NOTE — ED PROVIDER NOTES
EMERGENCY DEPARTMENT ENCOUNTER    Room Number:  27/27  PCP: Napoleon Mead MD  Historian: Patient      HPI:  Chief Complaint: Bilateral leg pain  A complete HPI/ROS/PMH/PSH/SH/FH are unobtainable due to: None  Context: Halie Guidry is a 83 y.o. female who presents to the ED c/o bilateral leg pain.  Patient states symptoms been going on for a week.  Patient has had Doppler at home that was negative.  Patient states she has pain in both legs.  Today it is to the point she cannot walk.  Patient has had no shortness of breath.  States she sleeps in a recliner so she is never supine.  Patient has had no chest pain pressure tightness.  Has had no vomiting or diarrhea.            PAST MEDICAL HISTORY  Active Ambulatory Problems     Diagnosis Date Noted    Compression fracture 04/22/2009    Lumbar degenerative disc disease 07/05/2012    Type 2 diabetes mellitus with hyperglycemia, with long-term current use of insulin     Hyperlipidemia     Benign essential hypertension 08/20/2014    Primary hypothyroidism     Leukocytosis     Neuropathy     Osteoporosis 09/09/2015    Proteinuria 02/28/2012    Tobacco abuse 08/22/2013    Diabetic eye exam 02/12/2014    Generalized weakness 05/27/2017    Spinal stenosis 05/27/2017    Scoliosis 05/27/2017    Arthritis 05/27/2017    VBI (vertebrobasilar insufficiency) 05/28/2017    Orthostatic hypotension 05/28/2017    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 05/28/2017    Severe hypothyroidism 05/30/2017    Noncompliance with medication regimen 05/30/2017    Vitamin D deficiency disease 06/01/2017    Altered mental status 12/15/2017    Tremor 01/09/2018    Seizure 05/21/2019    Stage 3a chronic kidney disease 03/09/2012    Thoracic degenerative disc disease 01/27/2020    Metabolic encephalopathy 12/02/2021    VIPUL (acute kidney injury) 12/02/2021    Hyperglycemia 12/02/2021    Type II diabetes mellitus, uncontrolled 12/02/2021    Lower abdominal pain 02/23/2022     Transaminitis 02/23/2022    COVID-19 virus detected 02/23/2022    UTI (urinary tract infection) 02/23/2022    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    Choledocholithiasis 02/23/2022    Hypokalemia 02/24/2022    Hypoxia 02/24/2022    Generalized abdominal pain 03/26/2022    History of Clostridium difficile infection 03/26/2022    History of ERCP 03/26/2022    Acute UTI (urinary tract infection) 03/27/2022    Hypomagnesemia 03/28/2022    Burning pain 05/27/2022    Anxiety disorder 05/27/2022    Neuropathic pain 05/27/2022    Intertrigo 05/27/2022    Weakness of both lower extremities 06/24/2022    Accelerated hypertension 09/01/2022    Acute cystitis without hematuria 09/06/2022    Altered mental status, unspecified altered mental status type 11/04/2022    Left lower lobe pneumonia 11/04/2022    Acute pain of left foot 05/13/2023    Cellulitis and abscess of left lower extremity 05/15/2023    Hypertensive kidney disease with stage 3a chronic kidney disease 06/02/2023     Resolved Ambulatory Problems     Diagnosis Date Noted    Acute metabolic encephalopathy 06/22/2022    Hypoglycemia 06/23/2022    Acute metabolic encephalopathy 06/24/2022    Diarrhea 11/04/2022     Past Medical History:   Diagnosis Date    Anxiety     Bleeding disorder     Depression     Diabetes     Diabetes mellitus, type 2     Disc degeneration, lumbar     Headache, tension-type     Hypothyroidism     Peripheral neuropathy     Rotator cuff tear, left     Shoulder pain          PAST SURGICAL HISTORY  Past Surgical History:   Procedure Laterality Date    APPENDECTOMY      BILATERAL BREAST REDUCTION Bilateral 08/2015    CATARACT EXTRACTION  03/2015    CHOLECYSTECTOMY WITH INTRAOPERATIVE CHOLANGIOGRAM N/A 3/27/2022    Procedure: CHOLECYSTECTOMY LAPAROSCOPIC INTRAOPERATIVE CHOLANGIOGRAM;  Surgeon: Aiyana Hill MD;  Location: [facility] OR;  Service: General;  Laterality: N/A;    COLONOSCOPY  06/05/2015    WNL    ERCP N/A 2/25/2022    Procedure:  ENDOSCOPIC RETROGRADE CHOLANGIOPANCREATOGRAPHY with sphincterotomy and balloon sweep;  Surgeon: Chilo Wilhelm MD;  Location: [facility] ENDOSCOPY;  Service: Gastroenterology;  Laterality: N/A;  PRE/POST - CBD stones    ERCP N/A 3/28/2022    Procedure: ENDOSCOPIC RETROGRADE CHOLANGIOPANCREATOGRAPHY WITH SPHINCTEROTOMY AND BALLOON SWEEP;  Surgeon: Chilo Wilhelm MD;  Location: [facility] ENDOSCOPY;  Service: Gastroenterology;  Laterality: N/A;  PRE: COMMON DUCT STONE  POST: COMMON DUCT STONE    KYPHOPLASTY      REDUCTION MAMMAPLASTY      TONSILLECTOMY           FAMILY HISTORY  Family History   Problem Relation Age of Onset    Bone cancer Mother     No Known Problems Father          SOCIAL HISTORY  Social History     Socioeconomic History    Marital status:    Tobacco Use    Smoking status: Former     Packs/day: 1.00     Types: Cigarettes     Quit date: 2013     Years since quitting: 10.5    Smokeless tobacco: Never   Vaping Use    Vaping Use: Never used   Substance and Sexual Activity    Alcohol use: No    Drug use: No    Sexual activity: Defer         ALLERGIES  Sulfa antibiotics        REVIEW OF SYSTEMS  Review of Systems   Leg pain leg swelling      PHYSICAL EXAM  ED Triage Vitals [07/21/23 1813]   Temp Heart Rate Resp BP SpO2   97.8 øF (36.6 øC) 66 18 -- 96 %      Temp src Heart Rate Source Patient Position BP Location FiO2 (%)   -- -- -- -- --       Physical Exam      GENERAL: no acute distress  HENT: nares patent  EYES: no scleral icterus  CV: regular rhythm, normal rate  RESPIRATORY: normal effort  ABDOMEN: soft  MUSCULOSKELETAL: no deformity. Bilateral edema  NEURO: alert, moves all extremities, follows commands  PSYCH:  calm, cooperative  SKIN: warm, dry    Vital signs and nursing notes reviewed.          LAB RESULTS  Recent Results (from the past 24 hour(s))   ECG 12 Lead Dyspnea    Collection Time: 07/21/23  6:34 PM   Result Value Ref Range    QT Interval 461 ms   Comprehensive Metabolic Panel     Collection Time: 07/21/23  6:46 PM    Specimen: Blood   Result Value Ref Range    Glucose 336 (H) 65 - 99 mg/dL    BUN 29 (H) 8 - 23 mg/dL    Creatinine 1.13 (H) 0.57 - 1.00 mg/dL    Sodium 132 (L) 136 - 145 mmol/L    Potassium 4.3 3.5 - 5.2 mmol/L    Chloride 100 98 - 107 mmol/L    CO2 20.6 (L) 22.0 - 29.0 mmol/L    Calcium 8.7 8.6 - 10.5 mg/dL    Total Protein 7.1 6.0 - 8.5 g/dL    Albumin 3.9 3.5 - 5.2 g/dL    ALT (SGPT) 10 1 - 33 U/L    AST (SGOT) 18 1 - 32 U/L    Alkaline Phosphatase 109 39 - 117 U/L    Total Bilirubin 0.4 0.0 - 1.2 mg/dL    Globulin 3.2 gm/dL    A/G Ratio 1.2 g/dL    BUN/Creatinine Ratio 25.7 (H) 7.0 - 25.0    Anion Gap 11.4 5.0 - 15.0 mmol/L    eGFR 48.4 (L) >60.0 mL/min/1.73   BNP    Collection Time: 07/21/23  6:46 PM    Specimen: Blood   Result Value Ref Range    proBNP 320.0 0.0 - 1,800.0 pg/mL   CBC Auto Differential    Collection Time: 07/21/23  6:46 PM    Specimen: Blood   Result Value Ref Range    WBC 9.02 3.40 - 10.80 10*3/mm3    RBC 4.09 3.77 - 5.28 10*6/mm3    Hemoglobin 11.4 (L) 12.0 - 15.9 g/dL    Hematocrit 34.7 34.0 - 46.6 %    MCV 84.8 79.0 - 97.0 fL    MCH 27.9 26.6 - 33.0 pg    MCHC 32.9 31.5 - 35.7 g/dL    RDW 13.7 12.3 - 15.4 %    RDW-SD 41.7 37.0 - 54.0 fl    MPV 9.6 6.0 - 12.0 fL    Platelets 308 140 - 450 10*3/mm3    Neutrophil % 74.1 42.7 - 76.0 %    Lymphocyte % 15.5 (L) 19.6 - 45.3 %    Monocyte % 6.9 5.0 - 12.0 %    Eosinophil % 2.0 0.3 - 6.2 %    Basophil % 0.8 0.0 - 1.5 %    Immature Grans % 0.7 (H) 0.0 - 0.5 %    Neutrophils, Absolute 6.69 1.70 - 7.00 10*3/mm3    Lymphocytes, Absolute 1.40 0.70 - 3.10 10*3/mm3    Monocytes, Absolute 0.62 0.10 - 0.90 10*3/mm3    Eosinophils, Absolute 0.18 0.00 - 0.40 10*3/mm3    Basophils, Absolute 0.07 0.00 - 0.20 10*3/mm3    Immature Grans, Absolute 0.06 (H) 0.00 - 0.05 10*3/mm3    nRBC 0.0 0.0 - 0.2 /100 WBC       Ordered the above labs and reviewed the results.        RADIOLOGY  XR Chest 1 View    Result Date:  7/21/2023  SINGLE VIEW CHEST RADIOGRAPH  HISTORY: 83-year-old female with history of shortness of breath.  FINDINGS: An upright AP portable chest radiograph was obtained. Comparison is made to a chest radiograph dated 05/13/2023. The lungs are normal in volume. There is hazy soft tissue opacification at the base of the right lung medially. The left lung is clear. There is suspected soft tissue fullness in the right hilum. Mild atheromatous calcification in the aortic arch is noted. Osteopenia is appreciated. Bilateral glenohumeral joint osteophytic change is noted.      There is mild right lung base atelectasis versus pneumonia. Soft tissue fullness in the region of the right hilum is also suspected. A follow-up CT of the chest with contrast is suggested.  This report was finalized on 7/21/2023 8:02 PM by Dr. Steven Garcia M.D.       Ordered the above noted radiological studies.  Chest x-ray independently interpreted by me and shows no evidence of pneumothorax          PROCEDURES  Procedures            MEDICATIONS GIVEN IN ER  Medications   sodium chloride 0.9 % flush 10 mL (has no administration in time range)   sodium chloride 0.9 % flush 10 mL (has no administration in time range)   sodium chloride 0.9 % infusion 40 mL (has no administration in time range)   sennosides-docusate (PERICOLACE) 8.6-50 MG per tablet 2 tablet (has no administration in time range)     And   polyethylene glycol (MIRALAX) packet 17 g (has no administration in time range)     And   bisacodyl (DULCOLAX) EC tablet 5 mg (has no administration in time range)     And   bisacodyl (DULCOLAX) suppository 10 mg (has no administration in time range)   dextrose (GLUTOSE) oral gel 15 g (has no administration in time range)   dextrose (D50W) (25 g/50 mL) IV injection 25 g (has no administration in time range)   glucagon (GLUCAGEN) injection 1 mg (has no administration in time range)   insulin lispro (HUMALOG/ADMELOG) injection 3-14 Units (has no  administration in time range)                   MEDICAL DECISION MAKING, PROGRESS, and CONSULTS    Discussion below represents my analysis of pertinent findings related to patient's condition, differential diagnosis, treatment plan and final disposition.      Additional sources:  - Discussed/ obtained information from independent historians: None    - External (non-ED) record review: Epic reviewed and patient seen by primary provider 2 days ago for edema of lower extremities    - Chronic or social conditions impacting care: None    - Shared decision making: None      Orders placed during this visit:  Orders Placed This Encounter   Procedures    XR Chest 1 View    Comprehensive Metabolic Panel    BNP    CBC Auto Differential    Basic Metabolic Panel    CBC (No Diff)    Diet: Cardiac Diets, Diabetic Diets; Healthy Heart (2-3 Na+); Consistent Carbohydrate; Texture: Regular Texture (IDDSI 7); Fluid Consistency: Thin (IDDSI 0)    Vital Signs    Intake & Output    Weigh Patient    Oral Care    Saline Lock & Maintain IV Access    Place Sequential Compression Device    Maintain Sequential Compression Device    Up With Assistance    Daily Weights    Neurovascular Checks    Code Status and Medical Interventions:    LHA (on-call MD unless specified) Details    Inpatient Case Management  Consult    PT Consult: Eval & Treat As Tolerated; Discharge Placement Assessment, Functional Mobility Below Baseline    Pulse Oximetry,  Spot    Incentive Spirometry    Oxygen Therapy- Nasal Cannula; Titrate 1-6 LPM Per SpO2; 90 - 95%    POC Glucose 4x Daily AC & at Bedtime    ECG 12 Lead Dyspnea    Insert Peripheral IV    Initiate Observation Status    CBC & Differential         Additional orders considered but not ordered:  None        Differential diagnosis includes but is not limited to:    Peripheral edema, CHF      Independent interpretation of labs, radiology studies, and discussions with consultants:  ED Course as of  07/21/23 2021 Fri Jul 21, 2023 1946 19:46 EDT  Discussed with family and they state patient has been incontinent of stool and having to use her hands to throw it in the trash because she cannot get out of the chair.  Patient does not appear to be in congestive heart.  Does not appear to have DVT.  Does not appear to have cellulitis.  Patient has been seen by CCP and they feel like she will be able to be placed in rehab however this will not happen tonight.  Discussed with Dr. Vaughn who will admit. [SL]      ED Course User Index  [SL] Marco Ferguson MD                 DIAGNOSIS  Final diagnoses:   Bilateral leg pain   Unable to ambulate   Peripheral edema         DISPOSITION  admit            Latest Documented Vital Signs:  As of 20:21 EDT  BP- 156/73 HR- 60 Temp- 97.8 øF (36.6 øC) O2 sat- 90%              --    Please note that portions of this were completed with a voice recognition program.       Note Disclaimer: At [facility], we believe that sharing information builds trust and better relationships. You are receiving this note because you are receiving care at [facility] or recently visited. It is possible you will see health information before a provider has talked with you about it. This kind of information can be easy to misunderstand. To help you fully understand what it means for your health, we urge you to discuss this note with your provider.            Marco Ferguson MD  07/21/23 2045

## 2023-07-22 LAB
ANION GAP SERPL CALCULATED.3IONS-SCNC: 10 MMOL/L (ref 5–15)
BUN SERPL-MCNC: 27 MG/DL (ref 8–23)
BUN/CREAT SERPL: 23.9 (ref 7–25)
CALCIUM SPEC-SCNC: 9 MG/DL (ref 8.6–10.5)
CHLORIDE SERPL-SCNC: 101 MMOL/L (ref 98–107)
CO2 SERPL-SCNC: 25 MMOL/L (ref 22–29)
CREAT SERPL-MCNC: 1.13 MG/DL (ref 0.57–1)
DEPRECATED RDW RBC AUTO: 41.7 FL (ref 37–54)
EGFRCR SERPLBLD CKD-EPI 2021: 48.4 ML/MIN/1.73
ERYTHROCYTE [DISTWIDTH] IN BLOOD BY AUTOMATED COUNT: 13.6 % (ref 12.3–15.4)
GLUCOSE BLDC GLUCOMTR-MCNC: 176 MG/DL (ref 70–130)
GLUCOSE BLDC GLUCOMTR-MCNC: 210 MG/DL (ref 70–130)
GLUCOSE BLDC GLUCOMTR-MCNC: 346 MG/DL (ref 70–130)
GLUCOSE BLDC GLUCOMTR-MCNC: 408 MG/DL (ref 70–130)
GLUCOSE SERPL-MCNC: 278 MG/DL (ref 65–99)
HCT VFR BLD AUTO: 34.6 % (ref 34–46.6)
HGB BLD-MCNC: 11.4 G/DL (ref 12–15.9)
MCH RBC QN AUTO: 27.7 PG (ref 26.6–33)
MCHC RBC AUTO-ENTMCNC: 32.9 G/DL (ref 31.5–35.7)
MCV RBC AUTO: 84 FL (ref 79–97)
PLATELET # BLD AUTO: 286 10*3/MM3 (ref 140–450)
PMV BLD AUTO: 9.5 FL (ref 6–12)
POTASSIUM SERPL-SCNC: 3.6 MMOL/L (ref 3.5–5.2)
RBC # BLD AUTO: 4.12 10*6/MM3 (ref 3.77–5.28)
SODIUM SERPL-SCNC: 136 MMOL/L (ref 136–145)
WBC NRBC COR # BLD: 8.42 10*3/MM3 (ref 3.4–10.8)

## 2023-07-22 PROCEDURE — 80048 BASIC METABOLIC PNL TOTAL CA: CPT | Performed by: NURSE PRACTITIONER

## 2023-07-22 PROCEDURE — 97166 OT EVAL MOD COMPLEX 45 MIN: CPT

## 2023-07-22 PROCEDURE — 36415 COLL VENOUS BLD VENIPUNCTURE: CPT | Performed by: NURSE PRACTITIONER

## 2023-07-22 PROCEDURE — G0378 HOSPITAL OBSERVATION PER HR: HCPCS

## 2023-07-22 PROCEDURE — 85027 COMPLETE CBC AUTOMATED: CPT | Performed by: NURSE PRACTITIONER

## 2023-07-22 PROCEDURE — 97110 THERAPEUTIC EXERCISES: CPT

## 2023-07-22 PROCEDURE — 82948 REAGENT STRIP/BLOOD GLUCOSE: CPT

## 2023-07-22 PROCEDURE — 97530 THERAPEUTIC ACTIVITIES: CPT

## 2023-07-22 PROCEDURE — 97162 PT EVAL MOD COMPLEX 30 MIN: CPT

## 2023-07-22 PROCEDURE — 63710000001 INSULIN GLARGINE PER 5 UNITS: Performed by: NURSE PRACTITIONER

## 2023-07-22 PROCEDURE — 63710000001 INSULIN LISPRO (HUMAN) PER 5 UNITS: Performed by: NURSE PRACTITIONER

## 2023-07-22 RX ORDER — HYDROCODONE BITARTRATE AND ACETAMINOPHEN 5; 325 MG/1; MG/1
1 TABLET ORAL EVERY 6 HOURS PRN
Status: DISPENSED | OUTPATIENT
Start: 2023-07-22 | End: 2023-07-29

## 2023-07-22 RX ADMIN — LEVOTHYROXINE SODIUM 112 MCG: 0.11 TABLET ORAL at 05:07

## 2023-07-22 RX ADMIN — INSULIN LISPRO 3 UNITS: 100 INJECTION, SOLUTION INTRAVENOUS; SUBCUTANEOUS at 17:16

## 2023-07-22 RX ADMIN — INSULIN LISPRO 14 UNITS: 100 INJECTION, SOLUTION INTRAVENOUS; SUBCUTANEOUS at 08:13

## 2023-07-22 RX ADMIN — TERAZOSIN 1 MG: 1 CAPSULE ORAL at 20:44

## 2023-07-22 RX ADMIN — PREGABALIN 50 MG: 50 CAPSULE ORAL at 20:43

## 2023-07-22 RX ADMIN — CHLORTHALIDONE 25 MG: 25 TABLET ORAL at 08:13

## 2023-07-22 RX ADMIN — ATORVASTATIN CALCIUM 80 MG: 20 TABLET, FILM COATED ORAL at 20:43

## 2023-07-22 RX ADMIN — PREGABALIN 50 MG: 50 CAPSULE ORAL at 14:49

## 2023-07-22 RX ADMIN — DULOXETINE HYDROCHLORIDE 30 MG: 30 CAPSULE, DELAYED RELEASE ORAL at 08:14

## 2023-07-22 RX ADMIN — TERAZOSIN 1 MG: 1 CAPSULE ORAL at 03:36

## 2023-07-22 RX ADMIN — PREGABALIN 50 MG: 50 CAPSULE ORAL at 08:14

## 2023-07-22 RX ADMIN — Medication 10 ML: at 20:54

## 2023-07-22 RX ADMIN — INSULIN LISPRO 10 UNITS: 100 INJECTION, SOLUTION INTRAVENOUS; SUBCUTANEOUS at 12:16

## 2023-07-22 RX ADMIN — HYDROCODONE BITARTRATE AND ACETAMINOPHEN 1 TABLET: 5; 325 TABLET ORAL at 20:54

## 2023-07-22 RX ADMIN — INSULIN LISPRO 8 UNITS: 100 INJECTION, SOLUTION INTRAVENOUS; SUBCUTANEOUS at 08:15

## 2023-07-22 RX ADMIN — INSULIN LISPRO 8 UNITS: 100 INJECTION, SOLUTION INTRAVENOUS; SUBCUTANEOUS at 12:15

## 2023-07-22 RX ADMIN — BISOPROLOL FUMARATE 5 MG: 5 TABLET, FILM COATED ORAL at 08:14

## 2023-07-22 RX ADMIN — HYDROCODONE BITARTRATE AND ACETAMINOPHEN 1 TABLET: 5; 325 TABLET ORAL at 14:49

## 2023-07-22 RX ADMIN — INSULIN LISPRO 8 UNITS: 100 INJECTION, SOLUTION INTRAVENOUS; SUBCUTANEOUS at 17:15

## 2023-07-22 RX ADMIN — LISINOPRIL 40 MG: 40 TABLET ORAL at 08:13

## 2023-07-22 RX ADMIN — Medication 1000 MCG: at 08:13

## 2023-07-22 RX ADMIN — HYDROCODONE BITARTRATE AND ACETAMINOPHEN 1 TABLET: 5; 325 TABLET ORAL at 07:37

## 2023-07-22 RX ADMIN — Medication 10 ML: at 08:15

## 2023-07-22 RX ADMIN — PANTOPRAZOLE SODIUM 40 MG: 40 TABLET, DELAYED RELEASE ORAL at 05:08

## 2023-07-22 RX ADMIN — INSULIN GLARGINE 65 UNITS: 100 INJECTION, SOLUTION SUBCUTANEOUS at 08:13

## 2023-07-22 RX ADMIN — AMLODIPINE BESYLATE 5 MG: 5 TABLET ORAL at 08:13

## 2023-07-22 NOTE — PLAN OF CARE
Goal Outcome Evaluation:  Plan of Care Reviewed With: patient        Progress: improving     Pt is a 83 y.o. female admitted to [facility] with c/o BLE pain/weakness on 7/21/2023. Patient with history of diabetes, hypertension, chronic kidney disease, venous insufficiency and other medical issues. She was previously seen in May 2023 for BLE cellulitis and was recommended d/c to rehab but denied services. She returns back to [facility] with ongoing BLE cellulitis and reports residing in [facility] with no [facility] and lives with her adult son who assist with ADLs as needed. She utilizes Rwx at baseline. Pt presents today with generalized weakness, BLE pain/swelling, dec endurance, and decreased functional mobility. Pt required SBA for bed mobility, MinAx1 for STS transfers, and minAx1 for ambulation with RWx for 3 ft from bed to chair. Pt will benefit from skilled PT to address the previous deficits and improve overall safety with functional mobility. Anticipate pt will D/C to SNF pending progress.     Anticipated Discharge Disposition (PT): skilled nursing facility

## 2023-07-22 NOTE — ED NOTES
"Nursing report ED to floor  Halie Guidry  83 y.o.  female    HPI :   Chief Complaint   Patient presents with    Leg Pain       Admitting doctor:   Aman Vaughn MD    Admitting diagnosis:   The primary encounter diagnosis was Bilateral leg pain. Diagnoses of Unable to ambulate and Peripheral edema were also pertinent to this visit.    Code status:   Current Code Status       Date Active Code Status Order ID Comments User Context       7/21/2023 2008 CPR (Attempt to Resuscitate) [id number]  Yanet Bartlett APRN ED        Question Answer    Code Status (Patient has no pulse and is not breathing) CPR (Attempt to Resuscitate)    Medical Interventions (Patient has pulse or is breathing) Full Support    Release to patient Routine Release                    Allergies:   Sulfa antibiotics    Isolation:   No active isolations    Intake and Output  No intake or output data in the 24 hours ending 07/21/23 2010    Weight:       07/21/23  1835   Weight: 77.1 kg (170 lb)       Most recent vitals:   Vitals:    07/21/23 1821 07/21/23 1831 07/21/23 1835 07/21/23 1901   BP: 158/67 145/66  143/66   Pulse:  57  57   Resp:       Temp:       SpO2:  96%  94%   Weight:   77.1 kg (170 lb)    Height:   172.7 cm (68\")        Active LDAs/IV Access:   Lines, Drains & Airways       Active LDAs       Name Placement date Placement time Site Days    Peripheral IV 07/21/23 1944 Posterior;Right Hand 07/21/23 1944  Hand  less than 1                    Labs (abnormal labs have a star):   Labs Reviewed   COMPREHENSIVE METABOLIC PANEL - Abnormal; Notable for the following components:       Result Value    Glucose 336 (*)     BUN 29 (*)     Creatinine 1.13 (*)     Sodium 132 (*)     CO2 20.6 (*)     BUN/Creatinine Ratio 25.7 (*)     eGFR 48.4 (*)     All other components within normal limits    Narrative:     GFR Normal >60  Chronic Kidney Disease <60  Kidney Failure <15    The GFR formula is only valid for adults with stable renal " function between ages 18 and 70.   CBC WITH AUTO DIFFERENTIAL - Abnormal; Notable for the following components:    Hemoglobin 11.4 (*)     Lymphocyte % 15.5 (*)     Immature Grans % 0.7 (*)     Immature Grans, Absolute 0.06 (*)     All other components within normal limits   BNP (IN-HOUSE) - Normal    Narrative:     Among patients with dyspnea, NT-proBNP is highly sensitive for the detection of acute congestive heart failure. In addition NT-proBNP of <300 pg/ml effectively rules out acute congestive heart failure with 99% negative predictive value.    Results may be falsely decreased if patient taking Biotin.     POCT GLUCOSE FINGERSTICK   POCT GLUCOSE FINGERSTICK   POCT GLUCOSE FINGERSTICK   POCT GLUCOSE FINGERSTICK   CBC AND DIFFERENTIAL    Narrative:     The following orders were created for panel order CBC & Differential.  Procedure                               Abnormality         Status                     ---------                               -----------         ------                     CBC Auto Differential[[id number]]        Abnormal            Final result                 Please view results for these tests on the individual orders.       EKG:   ECG 12 Lead Dyspnea   Preliminary Result   HEART RATE= 56  bpm   RR Interval= 1071  ms   TX Interval= 175  ms   P Horizontal Axis= -64  deg   P Front Axis= 9  deg   QRSD Interval= 149  ms   QT Interval= 461  ms   QRS Axis= 18  deg   T Wave Axis= -11  deg   - ABNORMAL ECG -   Sinus rhythm   Right bundle branch block   Electronically Signed By:    Date and Time of Study: 2023-07-21 18:34:13          Meds given in ED:   Medications   sodium chloride 0.9 % flush 10 mL (has no administration in time range)   sodium chloride 0.9 % flush 10 mL (has no administration in time range)   sodium chloride 0.9 % infusion 40 mL (has no administration in time range)   sennosides-docusate (PERICOLACE) 8.6-50 MG per tablet 2 tablet (has no administration in time range)     And    polyethylene glycol (MIRALAX) packet 17 g (has no administration in time range)     And   bisacodyl (DULCOLAX) EC tablet 5 mg (has no administration in time range)     And   bisacodyl (DULCOLAX) suppository 10 mg (has no administration in time range)   dextrose (GLUTOSE) oral gel 15 g (has no administration in time range)   dextrose (D50W) (25 g/50 mL) IV injection 25 g (has no administration in time range)   glucagon (GLUCAGEN) injection 1 mg (has no administration in time range)   insulin lispro (HUMALOG/ADMELOG) injection 3-14 Units (has no administration in time range)       Imaging results:  XR Chest 1 View    Result Date: 7/21/2023  There is mild right lung base atelectasis versus pneumonia. Soft tissue fullness in the region of the right hilum is also suspected. A follow-up CT of the chest with contrast is suggested.  This report was finalized on 7/21/2023 8:02 PM by Dr. Steven Garcia M.D.       Ambulatory status:   - bedrest    Social issues:   Social History     Socioeconomic History    Marital status:    Tobacco Use    Smoking status: Former     Packs/day: 1.00     Types: Cigarettes     Quit date: 2013     Years since quitting: 10.5    Smokeless tobacco: Never   Vaping Use    Vaping Use: Never used   Substance and Sexual Activity    Alcohol use: No    Drug use: No    Sexual activity: Defer       NIH Stroke Scale:       Jeanmarie Hurst RN  07/21/23 20:10 EDT

## 2023-07-22 NOTE — PLAN OF CARE
Goal Outcome Evaluation:   Aox4. VSS on RA. Blood glucose has consistently been very elevated throughout shift. Patient has been able to ambulate assist x1 with a walker from bed to chair today. Intermittent bradycardia. Diabetic ulcer on left great toe. Will continue to monitor.                      Дмитрий Lambert

## 2023-07-22 NOTE — H&P
Patient Name:  Halie Guidry  YOB: 1940  MRN:  [id number]  Admit Date:  7/21/2023  Patient Care Team:  Napoleon Mead MD as PCP - General  Napoleon Mead MD as PCP - Family Medicine      Subjective   History Present Illness     Chief Complaint   Patient presents with    Leg Pain     History of Present Illness  Ms Guidry is a 83 year old female with history of uncontrolled diabetes mellitus, neuropathy, diabetic foot ulcer, HTN, HLD, hypothyroidism, recurrent UTIs, cognitive impairment, and depression who presents to [facility] complaining of bilateral lower extremity swelling and pain that has been ongoing since May, when she was first treated for a left foot diabetic ulcer. She states she has [facility] who completed a doppler of her lower extremities was negative for any DVTs.  She states that the pain has caused her to be unable to get up out of her recliner, or take care of herself.  She does live at home with her son, who insist she come to the emergency department for evaluation because her mobility had declined significantly. She confirms his concerns she states that she is unable to ambulate, or get out of her recliner to use the toilet, she is subsequently using depends, for stooling, and voiding.  She does endorse being extremely frustrated, with the pain in her feet. She does admits she did not take her insulin today, or check her blood glucose level.     Upon exam she is alert and oriented sitting up in bed, in no acute distress, her lungs are CTA, her heart rate and rhythm are normal regular, with no clicks, rubs, or gallops noted, her abdomen is soft round nondistended, nontender, with audible bowel sounds throughout, she does have bilateral lower extremity edema, with erythema, with healing diabetic ulcer left foot.  She denies any acute distress chest pain, shortness of breath, palpitations, fever, chills, cough, known sick contacts,  abdominal pain, nausea, vomiting, constipation, or  complaints.    Initial evaluation in the emergency department blood pressure 143/66, heart rate 57, respiratory rate 18, oxygen saturation 94% on room air, she is afebrile with a Tmax of 97.8  Creatinine is 1.13, BUN 29, down from June 2, 2023 where she was 1.27, 28, she does have noted hyperglycemia her glucose is 336, she has normal white count, her chest x-ray today shows mild right lung atelectasis versus pneumonia with soft tissue fullness of the right hilum.   EKG interpreted as sinus rhythm, heart rate 56, RBBB no obvious ischemia noted.    Patient will be admitted for further evaluation hyperglycemia, bilateral lower extremity pain, and other acute and or chronic conditions as needed.    Review of Systems   Constitutional:  Positive for activity change and fatigue.   Eyes: Negative.    Respiratory: Negative.     Cardiovascular:  Positive for leg swelling.   Gastrointestinal: Negative.    Endocrine: Negative.    Genitourinary: Negative.    Musculoskeletal:  Positive for arthralgias.   Skin:  Positive for color change and wound.   Neurological:  Positive for weakness.   Hematological: Negative.    Psychiatric/Behavioral: Negative.     All other systems reviewed and are negative.     Personal History     Past Medical History:   Diagnosis Date    Acute metabolic encephalopathy 6/24/2022    Anxiety     Arthritis     Benign essential hypertension 08/20/2014    Bleeding disorder     Depression     Diabetes     Diabetes mellitus, type 2     Disc degeneration, lumbar     Headache, tension-type     Hyperlipidemia     Hypothyroidism     Neuropathy     Osteoporosis 09/09/2015    Peripheral neuropathy     Rotator cuff tear, left     Scoliosis     Shoulder pain     LEFT, TORN ROTATOR CUFF S/P FALL    Spinal stenosis      Past Surgical History:   Procedure Laterality Date    APPENDECTOMY      BILATERAL BREAST REDUCTION Bilateral 08/2015    CATARACT  EXTRACTION  03/2015    CHOLECYSTECTOMY WITH INTRAOPERATIVE CHOLANGIOGRAM N/A 3/27/2022    Procedure: CHOLECYSTECTOMY LAPAROSCOPIC INTRAOPERATIVE CHOLANGIOGRAM;  Surgeon: Aiyana Hill MD;  Location: [facility] MAIN OR;  Service: General;  Laterality: N/A;    COLONOSCOPY  06/05/2015    WNL    ERCP N/A 2/25/2022    Procedure: ENDOSCOPIC RETROGRADE CHOLANGIOPANCREATOGRAPHY with sphincterotomy and balloon sweep;  Surgeon: Chilo Wilhelm MD;  Location: [facility] ENDOSCOPY;  Service: Gastroenterology;  Laterality: N/A;  PRE/POST - CBD stones    ERCP N/A 3/28/2022    Procedure: ENDOSCOPIC RETROGRADE CHOLANGIOPANCREATOGRAPHY WITH SPHINCTEROTOMY AND BALLOON SWEEP;  Surgeon: Chilo Wilhelm MD;  Location: [facility] ENDOSCOPY;  Service: Gastroenterology;  Laterality: N/A;  PRE: COMMON DUCT STONE  POST: COMMON DUCT STONE    KYPHOPLASTY      REDUCTION MAMMAPLASTY      TONSILLECTOMY       Family History   Problem Relation Age of Onset    Bone cancer Mother     No Known Problems Father      Social History     Tobacco Use    Smoking status: Former     Packs/day: 1.00     Types: Cigarettes     Quit date: 2013     Years since quitting: 10.5    Smokeless tobacco: Never   Vaping Use    Vaping Use: Never used   Substance Use Topics    Alcohol use: No    Drug use: No     No current facility-administered medications on file prior to encounter.     Current Outpatient Medications on File Prior to Encounter   Medication Sig Dispense Refill    amLODIPine (NORVASC) 5 MG tablet Take 1 tablet by mouth Daily. 90 tablet 1    amoxicillin-clavulanate (AUGMENTIN) 875-125 MG per tablet Take 1 tablet by mouth Every 12 (Twelve) Hours. 20 tablet 0    atorvastatin (LIPITOR) 80 MG tablet TAKE 1 TABLET EVERY NIGHT 30 tablet 0    BD Pen Needle Naila 2nd Gen 32G X 4 MM misc USE TO INJECT INSULIN FOUR TIMES DAILY 200 each 0    bisoprolol (ZEBeta) 5 MG tablet Take 1 tablet by mouth Daily. 90 tablet 1    chlorthalidone (HYGROTON) 25 MG  tablet Take 1 tablet by mouth Daily. 90 tablet 1    doxazosin (CARDURA) 1 MG tablet       Dulaglutide (Trulicity) 1.5 MG/0.5ML solution pen-injector Inject 1.5 mg under the skin into the appropriate area as directed 1 (One) Time Per Week. sunday 6 mL 3    DULoxetine (CYMBALTA) 30 MG capsule Take 1 capsule by mouth Daily. 90 capsule 1    HYDROcodone-acetaminophen (NORCO) 5-325 MG per tablet Take 1 tablet by mouth Every 6 (Six) Hours As Needed for Moderate Pain for up to 12 doses. 12 tablet 0    hydrocortisone-zinc oxide-bacitracin-nystatin cream Apply 1 application topically to the appropriate area as directed 2 (Two) Times a Day As Needed (rash). 5 g 0    Insulin Glargine, 1 Unit Dial, (TOUJEO) 300 UNIT/ML solution pen-injector injection Inject 65 Units under the skin into the appropriate area as directed Daily.      insulin lispro (ADMELOG) 100 UNIT/ML injection Inject 8 Units under the skin into the appropriate area as directed 3 (Three) Times a Day With Meals.  12    lansoprazole (PREVACID) 15 MG capsule Take 1 capsule by mouth Daily.      levothyroxine (SYNTHROID, LEVOTHROID) 112 MCG tablet levothyroxine 112 mcg 1 tablet daily, except on Sunday take 1.5 tablets. 94 tablet 1    lisinopril (PRINIVIL,ZESTRIL) 40 MG tablet Take 1 tablet by mouth Daily. 90 tablet 1    pregabalin (LYRICA) 50 MG capsule Take 1 capsule by mouth 3 (Three) Times a Day. 270 capsule 1    vitamin B-12 (VITAMIN B-12) 1000 MCG tablet Take 1 tablet by mouth Daily.       Allergies   Allergen Reactions    Sulfa Antibiotics Unknown - High Severity     Pt says it was a long time ago and I don't remember but it wasn't good.        Objective    Objective     Vital Signs  Temp:  [97.8 øF (36.6 øC)] 97.8 øF (36.6 øC)  Heart Rate:  [57-66] 60  Resp:  [18] 18  BP: (143-189)/(66-75) 189/66  SpO2:  [90 %-96 %] 91 %  on   ;   Device (Oxygen Therapy): room air  Body mass index is 29.7 kg/mý.    Physical Exam  Vitals and nursing note reviewed.    Constitutional:       General: She is awake.   HENT:      Head: Atraumatic.      Mouth/Throat:      Mouth: Mucous membranes are moist.   Eyes:      Conjunctiva/sclera: Conjunctivae normal.   Cardiovascular:      Rate and Rhythm: Normal rate and regular rhythm.      Pulses: Normal pulses.           Radial pulses are 2+ on the right side and 2+ on the left side.        Dorsalis pedis pulses are 2+ on the right side and 2+ on the left side.        Posterior tibial pulses are 2+ on the right side and 2+ on the left side.      Heart sounds: Normal heart sounds.   Pulmonary:      Effort: Pulmonary effort is normal.      Breath sounds: Normal breath sounds.   Abdominal:      General: Bowel sounds are normal.      Palpations: Abdomen is soft.   Musculoskeletal:      Right lower leg: Edema present.      Left lower leg: Edema present.   Skin:     General: Skin is warm.      Capillary Refill: Capillary refill takes less than 2 seconds.      Findings: Erythema present.      Comments: Left foot ulcer   Neurological:      General: No focal deficit present.      Mental Status: She is alert.   Psychiatric:         Behavior: Behavior is cooperative.       Results Review:  I reviewed the patient's new clinical results.  I reviewed the patient's new imaging results and agree with the interpretation.  I reviewed the patient's other test results and agree with the interpretation  I personally viewed and interpreted the patient's EKG/Telemetry data  Discussed with ED provider.    Lab Results (last 24 hours)       Procedure Component Value Units Date/Time    CBC & Differential [[id number]]  (Abnormal) Collected: 07/21/23 1846    Specimen: Blood Updated: 07/21/23 1910    Narrative:      The following orders were created for panel order CBC & Differential.  Procedure                               Abnormality         Status                     ---------                               -----------         ------                     CBC Auto  Differential[[id number]]        Abnormal            Final result                 Please view results for these tests on the individual orders.    Comprehensive Metabolic Panel [[id number]]  (Abnormal) Collected: 07/21/23 1846    Specimen: Blood Updated: 07/21/23 1929     Glucose 336 mg/dL      BUN 29 mg/dL      Creatinine 1.13 mg/dL      Sodium 132 mmol/L      Potassium 4.3 mmol/L      Chloride 100 mmol/L      CO2 20.6 mmol/L      Calcium 8.7 mg/dL      Total Protein 7.1 g/dL      Albumin 3.9 g/dL      ALT (SGPT) 10 U/L      AST (SGOT) 18 U/L      Alkaline Phosphatase 109 U/L      Total Bilirubin 0.4 mg/dL      Globulin 3.2 gm/dL      A/G Ratio 1.2 g/dL      BUN/Creatinine Ratio 25.7     Anion Gap 11.4 mmol/L      eGFR 48.4 mL/min/1.73     Narrative:      GFR Normal >60  Chronic Kidney Disease <60  Kidney Failure <15    The GFR formula is only valid for adults with stable renal function between ages 18 and 70.    BNP [[id number]]  (Normal) Collected: 07/21/23 1846    Specimen: Blood Updated: 07/21/23 1924     proBNP 320.0 pg/mL     Narrative:      Among patients with dyspnea, NT-proBNP is highly sensitive for the detection of acute congestive heart failure. In addition NT-proBNP of <300 pg/ml effectively rules out acute congestive heart failure with 99% negative predictive value.    Results may be falsely decreased if patient taking Biotin.      CBC Auto Differential [[id number]]  (Abnormal) Collected: 07/21/23 1846    Specimen: Blood Updated: 07/21/23 1910     WBC 9.02 10*3/mm3      RBC 4.09 10*6/mm3      Hemoglobin 11.4 g/dL      Hematocrit 34.7 %      MCV 84.8 fL      MCH 27.9 pg      MCHC 32.9 g/dL      RDW 13.7 %      RDW-SD 41.7 fl      MPV 9.6 fL      Platelets 308 10*3/mm3      Neutrophil % 74.1 %      Lymphocyte % 15.5 %      Monocyte % 6.9 %      Eosinophil % 2.0 %      Basophil % 0.8 %      Immature Grans % 0.7 %      Neutrophils, Absolute 6.69 10*3/mm3      Lymphocytes, Absolute 1.40 10*3/mm3      Monocytes,  Absolute 0.62 10*3/mm3      Eosinophils, Absolute 0.18 10*3/mm3      Basophils, Absolute 0.07 10*3/mm3      Immature Grans, Absolute 0.06 10*3/mm3      nRBC 0.0 /100 WBC             Imaging Results (Last 24 Hours)       Procedure Component Value Units Date/Time    XR Chest 1 View [[id number]] Collected: 07/1940     Updated: 07/21/23 2005    Narrative:      SINGLE VIEW CHEST RADIOGRAPH     HISTORY: 83-year-old female with history of shortness of breath.     FINDINGS: An upright AP portable chest radiograph was obtained.  Comparison is made to a chest radiograph dated 05/13/2023. The lungs are  normal in volume. There is hazy soft tissue opacification at the base of  the right lung medially. The left lung is clear. There is suspected soft  tissue fullness in the right hilum. Mild atheromatous calcification in  the aortic arch is noted. Osteopenia is appreciated. Bilateral  glenohumeral joint osteophytic change is noted.       Impression:      There is mild right lung base atelectasis versus pneumonia.  Soft tissue fullness in the region of the right hilum is also suspected.  A follow-up CT of the chest with contrast is suggested.     This report was finalized on 7/21/2023 8:02 PM by Dr. Steven Garcia M.D.               Results for orders placed during the hospital encounter of 06/22/22    Adult transthoracic echo complete    Interpretation Summary  ú Left ventricular wall thickness is consistent with mild concentric hypertrophy.  ú Calculated left ventricular EF = 68% Estimated left ventricular EF was in agreement with the calculated left ventricular EF. Left ventricular systolic function is normal.  ú Normal right ventricular cavity size and systolic function noted.  ú The left atrial cavity is mildly dilated  ú Saline test results are negative.  ú Mild mitral valve regurgitation is present.  ú There is no evidence of pericardial effusion      ECG 12 Lead Dyspnea   Preliminary Result   HEART RATE= 56  bpm    RR Interval= 1071  ms   NC Interval= 175  ms   P Horizontal Axis= -64  deg   P Front Axis= 9  deg   QRSD Interval= 149  ms   QT Interval= 461  ms   QRS Axis= 18  deg   T Wave Axis= -11  deg   - ABNORMAL ECG -   Sinus rhythm   Right bundle branch block   Electronically Signed By:    Date and Time of Study: 2023-07-21 18:34:13           Assessment/Plan     Active Hospital Problems    Diagnosis  POA    **Bilateral leg pain [M79.604, M79.605]  Yes    Hypertensive kidney disease with stage 3a chronic kidney disease [I12.9, N18.31]  Yes    Hyperlipidemia [E78.5]  Yes    Primary hypothyroidism [E03.9]  Yes    Type 2 diabetes mellitus with hyperglycemia, with long-term current use of insulin [E11.65, Z79.4]  Not Applicable      Resolved Hospital Problems   No resolved problems to display.     Bilateral leg pain  Out patient doppler unremarkable for DVT  Case management consulted for discharge planning patient requesting rehab placement-for reconditioning  Neurovascular checks per policy  She is followed by [facility] for wound care    Type 2 diabetes mellitus with hyperglycemia, with long-term current use of insulin  Chronic  Complicated by peripheral neuropathy, diabetic foot ulcer  Poorly controlled  Managed by endocrinology  Patient noncompliant with medication regimen  She does report recent change in endocrinologist, and is supposed to start with glucose monitoring system.  Accu-Cheks ACHS  SSI ordered for hyperglycemia  Hypoglycemia treatment per protocol  Continue home toujeco, basal insulin, and Lyrica for neuropathy    Hyperlipidemia  Chronic   Continue home statin    Essential hypertension  Chronic  Poorly controlled  Continue home amlodipine, chlorthalidone, bisoprolol, Cardura, and lisinopril    Primary hypothyroidism  Chronic  Managed by endocrinology  Continue home levothyroxine    Hypertensive kidney disease with stage 3a chronic kidney disease  Chronic   Stable   Monitor BMP  Monitor I's and  O's  Daily weights  Avoid nephrotoxic medications, hypovolemia, hypotension      I discussed the patient's findings and my recommendations with patient.    VTE Prophylaxis - SCDs.  Code Status - Full code.       SERGE Medina  [facility]  07/21/23  21:14 EDT

## 2023-07-22 NOTE — THERAPY EVALUATION
Patient Name: Halie Guidry  : 1940    MRN: [id number]                              Today's Date: 2023       Admit Date: 2023    Visit Dx:     ICD-10-CM ICD-9-CM   1. Bilateral leg pain  M79.604 729.5    M79.605    2. Unable to ambulate  R26.2 719.7   3. Peripheral edema  R60.9 782.3     Patient Active Problem List   Diagnosis    Compression fracture    Lumbar degenerative disc disease    Type 2 diabetes mellitus with hyperglycemia, with long-term current use of insulin    Hyperlipidemia    Benign essential hypertension    Primary hypothyroidism    Leukocytosis    Neuropathy    Osteoporosis    Proteinuria    Tobacco abuse    Diabetic eye exam    Generalized weakness    Spinal stenosis    Scoliosis    Arthritis    VBI (vertebrobasilar insufficiency)    Orthostatic hypotension    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    Severe hypothyroidism    Noncompliance with medication regimen    Vitamin D deficiency disease    Altered mental status    Tremor    Seizure    Stage 3a chronic kidney disease    Thoracic degenerative disc disease    Metabolic encephalopathy    VIPUL (acute kidney injury)    Hyperglycemia    Type II diabetes mellitus, uncontrolled    Lower abdominal pain    Transaminitis    COVID-19 virus detected    UTI (urinary tract infection)    Emphysematous cystitis    Choledocholithiasis    Hypokalemia    Hypoxia    Generalized abdominal pain    History of Clostridium difficile infection    History of ERCP    Acute UTI (urinary tract infection)    Hypomagnesemia    Burning pain    Anxiety disorder    Neuropathic pain    Intertrigo    Weakness of both lower extremities    Accelerated hypertension    Acute cystitis without hematuria    Altered mental status, unspecified altered mental status type    Left lower lobe pneumonia    Acute pain of left foot    Cellulitis and abscess of left lower extremity    Hypertensive kidney disease with stage 3a chronic kidney disease     Bilateral leg pain     Past Medical History:   Diagnosis Date    Acute metabolic encephalopathy 6/24/2022    Anxiety     Arthritis     Benign essential hypertension 08/20/2014    Bleeding disorder     Depression     Diabetes     Diabetes mellitus, type 2     Disc degeneration, lumbar     Headache, tension-type     Hyperlipidemia     Hypothyroidism     Neuropathy     Osteoporosis 09/09/2015    Peripheral neuropathy     Rotator cuff tear, left     Scoliosis     Shoulder pain     LEFT, TORN ROTATOR CUFF S/P FALL    Spinal stenosis      Past Surgical History:   Procedure Laterality Date    APPENDECTOMY      BILATERAL BREAST REDUCTION Bilateral 08/2015    CATARACT EXTRACTION  03/2015    CHOLECYSTECTOMY WITH INTRAOPERATIVE CHOLANGIOGRAM N/A 3/27/2022    Procedure: CHOLECYSTECTOMY LAPAROSCOPIC INTRAOPERATIVE CHOLANGIOGRAM;  Surgeon: Aiyana Hill MD;  Location: [facility] MAIN OR;  Service: General;  Laterality: N/A;    COLONOSCOPY  06/05/2015    WNL    ERCP N/A 2/25/2022    Procedure: ENDOSCOPIC RETROGRADE CHOLANGIOPANCREATOGRAPHY with sphincterotomy and balloon sweep;  Surgeon: Chilo Wilhelm MD;  Location: [facility] ENDOSCOPY;  Service: Gastroenterology;  Laterality: N/A;  PRE/POST - CBD stones    ERCP N/A 3/28/2022    Procedure: ENDOSCOPIC RETROGRADE CHOLANGIOPANCREATOGRAPHY WITH SPHINCTEROTOMY AND BALLOON SWEEP;  Surgeon: Chilo Wilhelm MD;  Location: [facility] ENDOSCOPY;  Service: Gastroenterology;  Laterality: N/A;  PRE: COMMON DUCT STONE  POST: COMMON DUCT STONE    KYPHOPLASTY      REDUCTION MAMMAPLASTY      TONSILLECTOMY        General Information       Row Name 07/22/23 1122          Physical Therapy Time and Intention    Document Type evaluation  -ALEXANDRU     Mode of Treatment individual therapy;physical therapy  -ALEXANDRU       Row Name 07/22/23 1122          General Information    Patient Profile Reviewed yes  -ALEXANDRU     Prior Level of Function independent:;all household mobility;gait;transfer;bed mobility;ADL's  -ALEXANDRU      Existing Precautions/Restrictions no known precautions/restrictions  -     Barriers to Rehab medically complex  -       Row Name 07/22/23 1122          Living Environment    People in Home child(beatriz), adult  -[facility] Name 07/22/23 1122          Home Main Entrance    Number of Stairs, Main Entrance none  -       Row Name 07/22/23 1122          Stairs Within Home, Primary    Number of Stairs, Within Home, Primary none  -[facility] Name 07/22/23 1122          Cognition    Orientation Status (Cognition) oriented x 3  -       Row Name 07/22/23 1122          Safety Issues, Functional Mobility    Safety Issues Affecting Function (Mobility) awareness of need for assistance;friction/shear risk;insight into deficits/self-awareness;judgment;positioning of assistive device;problem-solving;safety precautions follow-through/compliance  -     Impairments Affecting Function (Mobility) balance;endurance/activity tolerance;pain;strength  -     Comment, Safety Issues/Impairments (Mobility) gait belt, non-skid socks  -               User Key  (r) = Recorded By, (t) = Taken By, (c) = Cosigned By      Initials Name Provider Type    Kenya Dotson, PT Physical Therapist                   Mobility       Row Name 07/22/23 1125          Bed Mobility    Bed Mobility supine-sit  -     Supine-Sit Dana (Bed Mobility) modified independence;standby assist  -     Assistive Device (Bed Mobility) bed rails;head of bed elevated  -[facility] Name 07/22/23 1125          Transfers    Comment, (Transfers) cues for hand placement prior to standing  -[facility] Name 07/22/23 1125          Bed-Chair Transfer    Bed-Chair Dana (Transfers) minimum assist (75% patient effort);1 person assist  -     Assistive Device (Bed-Chair Transfers) walker, front-wheeled  -       Row Name 07/22/23 1125          Sit-Stand Transfer    Sit-Stand Dana (Transfers) minimum assist (75% patient effort);1 person  assist  -ALEXANDRU     Assistive Device (Sit-Stand Transfers) walker, front-wheeled  -ALEXANDRU       Row Name 07/22/23 1125          Gait/Stairs (Locomotion)    [place] Level (Gait) minimum assist (75% patient effort);1 person assist  -ALEXANDRU     Assistive Device (Gait) walker, front-wheeled  -ALEXANDRU     Distance in Feet (Gait) 3'  -ALEXANDRU     Deviations/Abnormal Patterns (Gait) antalgic;base of support, wide;weight shifting decreased;stride length decreased;gait speed decreased  -     Bilateral Gait Deviations forward flexed posture;heel strike decreased  -ALEXANDRU     Comment, (Gait/Stairs) Patient limited with gait due to BLE pain  -       Row Name 07/22/23 1125          Mobility    Extremity Weight-bearing Status left lower extremity;right lower extremity  -ALEXANDRU     Left Lower Extremity (Weight-bearing Status) weight-bearing as tolerated (WBAT)  -     Right Lower Extremity (Weight-bearing Status) weight-bearing as tolerated (WBAT)  -               User Key  (r) = Recorded By, (t) = Taken By, (c) = Cosigned By      Initials Name Provider Type    Kenya Dotson PT Physical Therapist                   Obj/Interventions       Row Name 07/22/23 1126          Range of Motion Comprehensive    General Range of Motion no range of motion deficits identified  -       Row Name 07/22/23 1126          Strength Comprehensive (MMT)    Comment, General Manual Muscle Testing (MMT) Assessment generalized weakness  -       Row Name 07/22/23 1126          Motor Skills    Therapeutic Exercise other (see comments)  AP/QS?GS/HS/hip ABD-ADD/LAQ/ seated marches x 10  -       Row Name 07/22/23 1126          Balance    Comment, Balance patient demo safe and independent sitting balance, mild unsteadiness when standing due to FF posture and BLE pain  -               User Key  (r) = Recorded By, (t) = Taken By, (c) = Cosigned By      Initials Name Provider Type    Kenya Dotson PT Physical Therapist                   Goals/Plan        Row Name 07/22/23 1130          Bed Mobility Goal 1 (PT)    Activity/Assistive Device (Bed Mobility Goal 1, PT) bed mobility activities, all  -ALEXANDRU     [place] Level/Cues Needed (Bed Mobility Goal 1, PT) modified independence  -ALEXANDRU     Time Frame (Bed Mobility Goal 1, PT) 10 days  -ALEXANDRU       Row Name 07/22/23 1130          Transfer Goal 1 (PT)    Activity/Assistive Device (Transfer Goal 1, PT) transfers, all;walker, rolling  -ALEXANDRU     [place] Level/Cues Needed (Transfer Goal 1, PT) contact guard required  -ALEXANDRU     Time Frame (Transfer Goal 1, PT) 10 days  -ALEXANDRU       Row Name 07/22/23 1130          Gait Training Goal 1 (PT)    Activity/Assistive Device (Gait Training Goal 1, PT) gait (walking locomotion);walker, rolling  -ALEXANDRU     [place] Level (Gait Training Goal 1, PT) contact guard required  -ALEXANDRU     Distance (Gait Training Goal 1, PT) 50'  -ALEXANDRU     Time Frame (Gait Training Goal 1, PT) 10 days  -       Row Name 07/22/23 1130          Therapy Assessment/Plan (PT)    Planned Therapy Interventions (PT) balance training;bed mobility training;gait training;patient/family education;ROM (range of motion);stair training;strengthening;transfer training  -               User Key  (r) = Recorded By, (t) = Taken By, (c) = Cosigned By      Initials Name Provider Type    Kenya Dotson, PT Physical Therapist                   Clinical Impression       Row Name 07/22/23 1127          Pain    Additional Documentation Pain Scale: FACES Pre/Post-Treatment (Group)  -       Row Name 07/22/23 1127          Pain Scale: FACES Pre/Post-Treatment    Pain: FACES Scale, Pretreatment 0-->no hurt  -ALEXANDRU     Posttreatment Pain Rating 4-->hurts little more  -ALEXANDRU     Pain Location - Side/Orientation Bilateral  -ALEXANDRU     Pain Location lower  -ALEXANDRU     Pain Location - extremity  -ALEXANDRU       Row Name 07/22/23 1127          Plan of Care Review    Plan of Care Reviewed With patient  -ALEXANDRU     Progress improving  -       Row Name  07/22/23 1127          Therapy Assessment/Plan (PT)    Rehab Potential (PT) good, to achieve stated therapy goals  -     Criteria for Skilled Interventions Met (PT) yes;meets criteria;skilled treatment is necessary  -     Therapy Frequency (PT) 6 times/wk  -       Row Name 07/22/23 1127          Vital Signs    O2 Delivery Pre Treatment room air  -ALEXANDRU     O2 Delivery Intra Treatment room air  -ALEXANDRU     O2 Delivery Post Treatment room air  -ALEXANDRU     Pre Patient Position Supine  -ALEXANDRU     Intra Patient Position Standing  -ALEXANDRU     Post Patient Position Sitting  -       Row Name 07/22/23 1127          Positioning and Restraints    Pre-Treatment Position in bed  -ALEXANDRU     Post Treatment Position chair  -ALEXANDRU     In Chair reclined;call light within reach;encouraged to call for assist;exit alarm on;legs elevated  -               User Key  (r) = Recorded By, (t) = Taken By, (c) = Cosigned By      Initials Name Provider Type    Kenya Dotson, PT Physical Therapist                   Outcome Measures       Row Name 07/22/23 1131 07/22/23 0815       How much help from another person do you currently need...    Turning from your back to your side while in flat bed without using bedrails? 4  -ALEXANDRU 3  -RICHARD    Moving from lying on back to sitting on the side of a flat bed without bedrails? 4  -ALEXANDRU 3  -RICHARD    Moving to and from a bed to a chair (including a wheelchair)? 2  -ALEXANDRU 2  -RICHARD    Standing up from a chair using your arms (e.g., wheelchair, bedside chair)? 2  -ALEXANDRU 2  -RICHARD    Climbing 3-5 steps with a railing? 1  -ALEXANDRU 2  -RICHARD    To walk in hospital room? 2  -ALEXANDRU 2  -RICHARD    AM-PAC 6 Clicks Score (PT) 15  -ALEXANDRU 14  -RICHARD    Highest level of mobility 4 --> Transferred to chair/commode  -ALEXANDRU 4 --> Transferred to chair/commode  -RICHARD      Row Name 07/22/23 1131          Functional Assessment    Outcome Measure Options AM-PAC 6 Clicks Basic Mobility (PT)  -               User Key  (r) = Recorded By, (t) = Taken By, (c) = Cosigned By       Initials Name Provider Type    Kenya Dotson PT Physical Therapist    Javier Courtney RN Registered Nurse                                 Physical Therapy Education       Title: PT OT SLP Therapies (Not Started)       Topic: Physical Therapy (Not Started)       Point: Mobility training (Not Started)       Learner Progress:  Not documented in this visit.              Point: Home exercise program (Not Started)       Learner Progress:  Not documented in this visit.              Point: Body mechanics (Not Started)       Learner Progress:  Not documented in this visit.              Point: Precautions (Not Started)       Learner Progress:  Not documented in this visit.                                  PT Recommendation and Plan  Planned Therapy Interventions (PT): balance training, bed mobility training, gait training, patient/family education, ROM (range of motion), stair training, strengthening, transfer training  Plan of Care Reviewed With: patient  Progress: improving     Time Calculation:         PT Charges       Row Name 07/22/23 1134             Time Calculation    Start Time 1107  -ALEXANDRU      Stop Time 1133  -ALEXANDRU      Time Calculation (min) 26 min  -ALEXANDRU      PT Received On 07/22/23  -ALEXANDRU      PT - Next Appointment 07/24/23  -ALEXANDRU      PT Goal Re-Cert Due Date 08/01/23  -ALEXANDRU                User Key  (r) = Recorded By, (t) = Taken By, (c) = Cosigned By      Initials Name Provider Type    Kenya Dotson PT Physical Therapist                  Therapy Charges for Today       Code Description Service Date Service Provider Modifiers Qty    91004443557 HC PT EVAL MOD COMPLEXITY 1 7/22/2023 Kenya Koehler, PT GP 1    16354483128 HC PT THERAPEUTIC ACT EA 15 MIN 7/22/2023 Kenya Koehler, PT GP 1    43736995988 HC PT THER PROC EA 15 MIN 7/22/2023 Kenya Koehler, PT GP 1            PT G-Codes  Outcome Measure Options: AM-PAC 6 Clicks Basic Mobility (PT)  AM-PAC 6 Clicks Score  (PT): 15  PT Discharge Summary  Anticipated Discharge Disposition (PT): skilled nursing facility    Kenya Koehler, PT  7/22/2023

## 2023-07-22 NOTE — CASE MANAGEMENT/SOCIAL WORK
Discharge Planning Assessment  [facility]     Patient Name: Halie Guidry  MRN: [id number]  Today's Date: 7/21/2023    Admit Date: 7/21/2023    Plan: Patient plans to discharge to sub-acute rehab, with goal of returning home at prior level of function. Referral sent to [facility], per patient request. Will likely require ambulance transport upon discharge.   Discharge Needs Assessment       Row Name 07/21/23 2023       Living Environment    People in Home child(beatriz), adult    Name(s) of People in Home Derrick (son)    Current Living Arrangements condominium    Potentially Unsafe Housing Conditions none    Primary Care Provided by child(beatriz)    Provides Primary Care For no one, unable/limited ability to care for self    Caregiving Concerns Caregiver strain related to marked decrease in functional ability.    Family Caregiver if Needed child(beatriz), adult    Family Caregiver Names Derrick (son)    Quality of Family Relationships helpful;involved;supportive    Living Arrangement Comments Resides with son in condominium.       Resource/Environmental Concerns    Resource/Environmental Concerns none    Transportation Concerns none       Transition Planning    Patient/Family Anticipates Transition to inpatient rehabilitation facility    Patient/Family Anticipated Services at Transition rehabilitation services    Transportation Anticipated health plan transportation       Discharge Needs Assessment    Equipment Currently Used at Home walker, rolling;commode    Concerns to be Addressed discharge planning    Anticipated Changes Related to Illness inability to care for self    Equipment Needed After Discharge none    Discharge Facility/Level of Care Needs rehabilitation facility    Patient's Choice of Community Agency(s) [facility] is primary choice.    Current Discharge Risk dependent with mobility/activities of daily living;physical impairment                   Discharge Plan       Row Name  07/21/23 2026       Plan    Plan Patient plans to discharge to sub-acute rehab, with goal of returning home at prior level of function. Referral sent to [facility], per patient request. Will likely require ambulance transport upon discharge.    Patient/Family in Agreement with Plan yes    Plan Comments Patient plans to discharge to sub-acute rehab, with goal of returning home at prior level of function. Referral sent to [facility], per patient request. She reports she has been to [place] for rehab in the past. She lives in [place] with her son, Derrick, and is typically able to ambulate with a rolling walker. She reports increased BLE swelling and weakness over the last few weeks, rendering her unable to ambulate. She uses [facility] at [place]. She will likely require ambulance transport at discharge. PT evaluation pending for pre-certification. Awaiting response from [facility].                  Continued Care and Services - Admitted Since 7/21/2023       Destination       Service Provider Request Status Selected Services Address Phone Fax Patient Preferred    [facility] Pending - Request Sent N/A [address], [place] [address] [phone]                   Selected Continued Care - Prior Encounters Includes continued care and service providers with selected services from prior encounters from 4/22/2023 to 7/21/2023      Discharged on 5/15/2023 Admission date: 5/12/2023 - Discharge disposition: Home-Health Care Svc      Home Medical Care       Service Provider Selected Services Address Phone Fax Patient Preferred    [facility] [address], [address], [place] [address] [phone] --                             Demographic Summary       Row Name 07/21/23 2015       General Information    Admission Type observation    Arrived From home    Referral Source admission  list;emergency department;physician    Reason for Consult discharge planning;facility placement    Preferred Language English       Contact Information    Permission Granted to Share Info With family/designee                   Functional Status       Row Name 07/21/23 2018       Functional Status    Usual Activity Tolerance moderate    Current Activity Tolerance poor       Physical Activity    On average, how many days per week do you engage in moderate to strenuous exercise (like a brisk walk)? 0 days    On average, how many minutes do you engage in exercise at this level? 0 min    Number of minutes of exercise per week 0       Functional Status, IADL    Medications completely dependent    Meal Preparation completely dependent    Housekeeping completely dependent    Laundry completely dependent    Shopping completely dependent    IADL Comments Patient reports that, at baseline, she uses a walker and ambulates independently. She lives with her son. Over the last week or so, she has experienced leg swelling and pain rendering her unable to ambulate and complete ADL's.       Mental Status    General Appearance WDL WDL       Mental Status Summary    Recent Changes in Mental Status/Cognitive Functioning no changes       Employment/    Employment Status retired    Current or Previous Occupation education                   Psychosocial       Row Name 07/21/23 2022       Values/Beliefs    Spiritual, Cultural Beliefs, Temple Practices, Values that Affect Care no       Behavior WDL    Behavior WDL WDL       Emotion Mood WDL    Emotion/Mood/Affect WDL WDL       Speech WDL    Speech WDL WDL       Perceptual State WDL    Perceptual State WDL WDL       Thought Process WDL    Thought Process WDL WDL       Intellectual Performance WDL    Intellectual Performance WDL WDL       Coping/Stress    Major Change/Loss/Stressor loss of independence    Patient Personal Strengths expressive of emotions;expressive of needs;strong  support system    Sources of Support adult child(beatriz)       Developmental Stage (Eriksson's)    Developmental Stage Stage 8 (65 years-death/Late Adulthood) Integrity vs. Despair                   Abuse/Neglect    No documentation.                  Legal    No documentation.                  Substance Abuse    No documentation.                  Patient Forms    No documentation.                     Alon Hernandez RN

## 2023-07-22 NOTE — PLAN OF CARE
Goal Outcome Evaluation:  Plan of Care Reviewed With: patient        Progress: no change  Outcome Evaluation: Pt is an 82 yo female admitted with BLE weakness. Recent admission in May 2023 for BLE cellulitis and was recommended d/c to rehab but denied services. Pt seen for OT eval this date, A&Ox4, reports she lives at home with her son, he cannot be home 24/7, reports mod (I) with ADLs and func mob with RWx, reports no falls recently. Today, pt presents below baseline with ADLs, func transfers, act tolerance, generalized weakness, and pain. She completed bed mob with SBA, STS with mod-max A x1, min A x1 for lateral steps to HOB, LBD with max A due to increased pain. Pt will continue to benefit from skilled OT to address noted functional deficits, rec SNF and pt agreeable.      Anticipated Discharge Disposition (OT): skilled nursing facility

## 2023-07-22 NOTE — THERAPY EVALUATION
Patient Name: Halie Guidry  : 1940    MRN: [id number]                              Today's Date: 2023       Admit Date: 2023    Visit Dx:     ICD-10-CM ICD-9-CM   1. Bilateral leg pain  M79.604 729.5    M79.605    2. Unable to ambulate  R26.2 719.7   3. Peripheral edema  R60.9 782.3     Patient Active Problem List   Diagnosis    Compression fracture    Lumbar degenerative disc disease    Type 2 diabetes mellitus with hyperglycemia, with long-term current use of insulin    Hyperlipidemia    Benign essential hypertension    Primary hypothyroidism    Leukocytosis    Neuropathy    Osteoporosis    Proteinuria    Tobacco abuse    Diabetic eye exam    Generalized weakness    Spinal stenosis    Scoliosis    Arthritis    VBI (vertebrobasilar insufficiency)    Orthostatic hypotension    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    Severe hypothyroidism    Noncompliance with medication regimen    Vitamin D deficiency disease    Altered mental status    Tremor    Seizure    Stage 3a chronic kidney disease    Thoracic degenerative disc disease    Metabolic encephalopathy    VIPUL (acute kidney injury)    Hyperglycemia    Type II diabetes mellitus, uncontrolled    Lower abdominal pain    Transaminitis    COVID-19 virus detected    UTI (urinary tract infection)    Emphysematous cystitis    Choledocholithiasis    Hypokalemia    Hypoxia    Generalized abdominal pain    History of Clostridium difficile infection    History of ERCP    Acute UTI (urinary tract infection)    Hypomagnesemia    Burning pain    Anxiety disorder    Neuropathic pain    Intertrigo    Weakness of both lower extremities    Accelerated hypertension    Acute cystitis without hematuria    Altered mental status, unspecified altered mental status type    Left lower lobe pneumonia    Acute pain of left foot    Cellulitis and abscess of left lower extremity    Hypertensive kidney disease with stage 3a chronic kidney disease     Bilateral leg pain     Past Medical History:   Diagnosis Date    Acute metabolic encephalopathy 6/24/2022    Anxiety     Arthritis     Benign essential hypertension 08/20/2014    Bleeding disorder     Depression     Diabetes     Diabetes mellitus, type 2     Disc degeneration, lumbar     Headache, tension-type     Hyperlipidemia     Hypothyroidism     Neuropathy     Osteoporosis 09/09/2015    Peripheral neuropathy     Rotator cuff tear, left     Scoliosis     Shoulder pain     LEFT, TORN ROTATOR CUFF S/P FALL    Spinal stenosis      Past Surgical History:   Procedure Laterality Date    APPENDECTOMY      BILATERAL BREAST REDUCTION Bilateral 08/2015    CATARACT EXTRACTION  03/2015    CHOLECYSTECTOMY WITH INTRAOPERATIVE CHOLANGIOGRAM N/A 3/27/2022    Procedure: CHOLECYSTECTOMY LAPAROSCOPIC INTRAOPERATIVE CHOLANGIOGRAM;  Surgeon: Aiyana Hill MD;  Location: [facility] MAIN OR;  Service: General;  Laterality: N/A;    COLONOSCOPY  06/05/2015    WNL    ERCP N/A 2/25/2022    Procedure: ENDOSCOPIC RETROGRADE CHOLANGIOPANCREATOGRAPHY with sphincterotomy and balloon sweep;  Surgeon: Chilo Wilhelm MD;  Location: [facility] ENDOSCOPY;  Service: Gastroenterology;  Laterality: N/A;  PRE/POST - CBD stones    ERCP N/A 3/28/2022    Procedure: ENDOSCOPIC RETROGRADE CHOLANGIOPANCREATOGRAPHY WITH SPHINCTEROTOMY AND BALLOON SWEEP;  Surgeon: Chilo Wilhelm MD;  Location: [facility] ENDOSCOPY;  Service: Gastroenterology;  Laterality: N/A;  PRE: COMMON DUCT STONE  POST: COMMON DUCT STONE    KYPHOPLASTY      REDUCTION MAMMAPLASTY      TONSILLECTOMY        General Information       Row Name 07/22/23 1327          OT Time and Intention    Document Type evaluation  -MW     Mode of Treatment individual therapy;occupational therapy  -MW       Row Name 07/22/23 1327          General Information    Patient Profile Reviewed yes  -MW     Prior Level of Function ADL's;all household mobility;transfer  Rwx at home, son lives with pt, no falls  reported  -     Existing Precautions/Restrictions no known precautions/restrictions  -     Barriers to Rehab medically complex  -       Row Name 07/22/23 1327          Living Environment    People in Home child(beatriz), adult  -[facility] Name 07/22/23 1327          Home Main Entrance    Number of Stairs, Main Entrance none  -[facility] Name 07/22/23 1327          Cognition    Orientation Status (Cognition) oriented x 3  -MW       [facility] Name 07/22/23 1327          Safety Issues, Functional Mobility    Safety Issues Affecting Function (Mobility) insight into deficits/self-awareness;safety precaution awareness;safety precautions follow-through/compliance;judgment  -     Impairments Affecting Function (Mobility) balance;endurance/activity tolerance;pain;strength  -               User Key  (r) = Recorded By, (t) = Taken By, (c) = Cosigned By      Initials Name Provider Type    Arcelia Wills OT Occupational Therapist                     Mobility/ADL's       [facility] Name 07/22/23 1327          Bed Mobility    Bed Mobility supine-sit;sit-supine  -     Supine-Sit [place] (Bed Mobility) standby assist  -     Sit-Supine [place] (Bed Mobility) standby assist  -     Assistive Device (Bed Mobility) bed rails;head of bed elevated  -[facility] Name 07/22/23 1327          Transfers    Transfers sit-stand transfer;stand-sit transfer  -[facility] Name 07/22/23 1327          Sit-Stand Transfer    Sit-Stand [place] (Transfers) moderate assist (50% patient effort);maximum assist (25% patient effort);1 person assist;verbal cues  -     Comment, (Sit-Stand Transfer) x2 STS from EOB, able to demo lateral steps with CGA/min a x1 with RWx, flexed posture onto Rwx 2/2 pain in BLE  -[facility] Name 07/22/23 1327          Stand-Sit Transfer    Stand-Sit [place] (Transfers) contact guard;minimum assist (75% patient effort);verbal cues  -     Assistive Device (Stand-Sit Transfers) walker,  front-wheeled  -MW       Row Name 07/22/23 1327          Functional Mobility    Functional Mobility- Ind. Level minimum assist (75% patient effort);1 person  -     Functional Mobility- Device walker, front-wheeled  -[facility] Name 07/22/23 1327          Activities of Daily Living    BADL Assessment/Intervention lower body dressing;toileting;feeding  -MW       Row Name 07/22/23 1327          Mobility    Extremity Weight-bearing Status left lower extremity;right lower extremity  -     Left Lower Extremity (Weight-bearing Status) weight-bearing as tolerated (WBAT)  -     Right Lower Extremity (Weight-bearing Status) weight-bearing as tolerated (WBAT)  -MW       Row Name 07/22/23 1327          Lower Body Dressing Assessment/Training    [place] Level (Lower Body Dressing) don;socks;maximum assist (25% patient effort)  -     Comment, (Lower Body Dressing) at EOB, 2/2 pain and increased swelling  -MW       Row Name 07/22/23 1327          Toileting Assessment/Training    Comment, (Toileting) brief and purewick donned, progressing toward [facility] [place] [place] this date  -MW       Row Name 07/22/23 1327          Self-Feeding Assessment/Training    Comment, (Feeding) hand to mouth BUE [facility]               User Key  (r) = Recorded By, (t) = Taken By, (c) = Cosigned By      Initials Name Provider Type     Arcelia Valentin OT Occupational Therapist                   Obj/Interventions       [facility] Name 07/22/23 1330          Sensory Assessment (Somatosensory)    Sensory Assessment (Somatosensory) UE sensation intact  -MW       Row Name 07/22/23 1330          Vision Assessment/Intervention    Visual Impairment/Limitations WFL  -MW       Row Name 07/22/23 1330          Range of Motion Comprehensive    Comment, General Range of Motion LUE shoulder ROM 50% end range 2/2 previous injury rotator cuff  -MW       Row Name 07/22/23 1330          Strength Comprehensive (MMT)    General Manual Muscle Testing (MMT) Assessment upper extremity  strength deficits identified  -     Comment, General Manual Muscle Testing (MMT) Assessment generalized UE weakness  -       Row Name 07/22/23 1330          Motor Skills    Motor Skills functional endurance  -     Functional Endurance fair  -[facility] Name 07/22/23 1330          Balance    Balance Assessment sitting static balance;sitting dynamic balance;sit to stand dynamic balance;standing dynamic balance;standing static balance  -     Static Sitting Balance standby assist  -     Dynamic Sitting Balance standby assist  -     Position, Sitting Balance sitting edge of bed  -     Sit to Stand Dynamic Balance moderate assist;maximum assist;1-person assist;verbal cues  -     Static Standing Balance contact guard  -     Dynamic Standing Balance minimal assist  -     Position/Device Used, Standing Balance supported;walker, front-wheeled  -     Balance Interventions sitting;standing;sit to stand;supported;static;dynamic;minimal challenge  -     Comment, Balance forward flexed onto Rwx, reports RLE giving her most trouble  -               User Key  (r) = Recorded By, (t) = Taken By, (c) = Cosigned By      Initials Name Provider Type     Arcelia Valentin, TOMÁS Occupational Therapist                   Goals/Plan       [facility] Name 07/22/23 3378          Transfer Goal 1 (OT)    Activity/Assistive Device (Transfer Goal 1, OT) sit-to-stand/stand-to-sit;bed-to-chair/chair-to-bed;toilet  -     [place] Level/Cues Needed (Transfer Goal 1, OT) minimum assist (75% or more patient effort)  -     Time Frame (Transfer Goal 1, OT) short term goal (STG);2 weeks  -     Progress/Outcome (Transfer Goal 1, OT) goal ongoing  -[facility] Name 07/22/23 4378          Dressing Goal 1 (OT)    Activity/Device (Dressing Goal 1, OT) lower body dressing  -     [place]/Cues Needed (Dressing Goal 1, OT) minimum assist (75% or more patient effort)  -     Time Frame (Dressing Goal 1, OT) short term goal  (STG);2 weeks  -MW     Progress/Outcome (Dressing Goal 1, OT) goal ongoing  -MW       Row Name 07/22/23 1334          Toileting Goal 1 (OT)    Activity/Device (Toileting Goal 1, OT) toileting skills, all  -MW     [place] Level/Cues Needed (Toileting Goal 1, OT) minimum assist (75% or more patient effort)  -MW     Time Frame (Toileting Goal 1, OT) short term goal (STG);2 weeks  -MW     Progress/Outcome (Toileting Goal 1, OT) goal ongoing  -MW       Row Name 07/22/23 1334          Grooming Goal 1 (OT)    Activity/Device (Grooming Goal 1, OT) grooming skills, all  -MW     [place] (Grooming Goal 1, OT) standby assist  -MW     Time Frame (Grooming Goal 1, OT) short term goal (STG);2 weeks  -MW     Progress/Outcome (Grooming Goal 1, OT) goal ongoing  -       Row Name 07/22/23 1339          Therapy Assessment/Plan (OT)    Planned Therapy Interventions (OT) functional balance retraining;activity tolerance training;BADL retraining;neuromuscular control/coordination retraining;patient/caregiver education/training;transfer/mobility retraining;strengthening exercise;ROM/therapeutic exercise;occupation/activity based interventions  -MW               User Key  (r) = Recorded By, (t) = Taken By, (c) = Cosigned By      Initials Name Provider Type    Arcelia Wills, TOMÁS Occupational Therapist                   Clinical Impression       Row Name 07/22/23 0112          Pain Assessment    Pre/Posttreatment Pain Comment RLE pain, did not rate  -       Row Name 07/22/23 1331          Plan of Care Review    Plan of Care Reviewed With patient  -MW     Progress no change  -MW     Outcome Evaluation Pt is an 84 yo female admitted with BLE weakness. Recent admission in May 2023 for BLE cellulitis and was recommended d/c to rehab but denied services. Pt seen for OT eval this date, A&Ox4, reports she lives at home with her son, he cannot be home 24/7, reports mod (I) with ADLs and func mob with RWx, reports no falls recently.  Today, pt presents below baseline with ADLs, func transfers, act tolerance, generalized weakness, and pain. She completed bed mob with SBA, STS with mod-max A x1, min A x1 for lateral steps to HOB, LBD with max A due to increased pain. Pt will continue to benefit from skilled OT to address noted functional deficits, rec SNF and pt agreeable.  -       Row Name 07/22/23 9541          Therapy Assessment/Plan (OT)    Rehab Potential (OT) good, to achieve stated therapy goals  -     Criteria for Skilled Therapeutic Interventions Met (OT) yes;meets criteria;skilled treatment is necessary  -MW     Therapy Frequency (OT) 5 times/wk  -       Row Name 07/22/23 1331          Therapy Plan Review/Discharge Plan (OT)    Anticipated Discharge Disposition (OT) skilled nursing facility  -       Row Name 07/22/23 1331          Vital Signs    O2 Delivery Pre Treatment room air  -MW     Pre Patient Position Supine  -MW     Intra Patient Position Standing  -MW     Post Patient Position Supine  -MW       Row Name 07/22/23 1331          Positioning and Restraints    Pre-Treatment Position in bed  -MW     Post Treatment Position bed  -MW     In Bed call light within reach;notified nsg;fowlers;patient within staff view;exit alarm on;encouraged to call for assist;side rails up x3;SCD pump applied  -               User Key  (r) = Recorded By, (t) = Taken By, (c) = Cosigned By      Initials Name Provider Type    Arcelia Wills, OT Occupational Therapist                   Outcome Measures       Row Name 07/22/23 1142          How much help from another is currently needed...    Putting on and taking off regular lower body clothing? 2  -MW     Bathing (including washing, rinsing, and drying) 2  -MW     Toileting (which includes using toilet bed pan or urinal) 2  -MW     Putting on and taking off regular upper body clothing 3  -MW     Taking care of personal grooming (such as brushing teeth) 3  -MW     Eating meals 4  -MW      AM-PAC 6 Clicks Score (OT) 16  -MW       Row Name 07/22/23 1131 07/22/23 0815       How much help from another person do you currently need...    Turning from your back to your side while in flat bed without using bedrails? 4  -ALEXANDRU 3  -RICHARD    Moving from lying on back to sitting on the side of a flat bed without bedrails? 4  -ALEXANDRU 3  -RICHARD    Moving to and from a bed to a chair (including a wheelchair)? 2  -ALEXANDRU 2  -RICHARD    Standing up from a chair using your arms (e.g., wheelchair, bedside chair)? 2  -ALEXANDRU 2  -RICHARD    Climbing 3-5 steps with a railing? 1  -ALEXANDRU 2  -RICHARD    To walk in hospital room? 2  -ALEXANDRU 2  -RICHARD    AM-PAC 6 Clicks Score (PT) 15  -ALEXANDRU 14  -RICHARD    Highest level of mobility 4 --> Transferred to chair/commode  -ALEXANDRU 4 --> Transferred to chair/commode  -RICHARD      Row Name 07/22/23 1347 07/22/23 1131       Functional Assessment    Outcome Measure Options AM-PAC 6 Clicks Daily Activity (OT)  -[facility]-PAC 6 Clicks Basic Mobility (PT)  -ALEXANDRU              User Key  (r) = Recorded By, (t) = Taken By, (c) = Cosigned By      Initials Name Provider Type    Kenya Dotson, PT Physical Therapist    Arcelia Wills, OT Occupational Therapist    Javier Courtney, RN Registered Nurse                    Occupational Therapy Education       Title: PT OT SLP Therapies (In Progress)       Topic: Occupational Therapy (In Progress)       Point: ADL training (Done)       Description:   Instruct learner(s) on proper safety adaptation and remediation techniques during self care or transfers.   Instruct in proper use of assistive devices.                  Learning Progress Summary             Patient Acceptance, E, VU by JENNIFER at 7/22/2023 1347    Comment: role of OT, d/c rec                         Point: Home exercise program (Not Started)       Description:   Instruct learner(s) on appropriate technique for monitoring, assisting and/or progressing therapeutic exercises/activities.                  Learner Progress:  Not documented in  this visit.              Point: Precautions (Done)       Description:   Instruct learner(s) on prescribed precautions during self-care and functional transfers.                  Learning Progress Summary             Patient Acceptance, E, VU by  at 7/22/2023 1347    Comment: role of OT, d/c rec                         Point: Body mechanics (Done)       Description:   Instruct learner(s) on proper positioning and spine alignment during self-care, functional mobility activities and/or exercises.                  Learning Progress Summary             Patient Acceptance, E, VU by  at 7/22/2023 1347    Comment: role of OT, d/c rec                                         User Key       Initials Effective Dates Name Provider Type Discipline    JENNIFER 08/20/21 -  Arcelia Valentin OT Occupational Therapist OT                  OT Recommendation and Plan  Planned Therapy Interventions (OT): functional balance retraining, activity tolerance training, BADL retraining, neuromuscular control/coordination retraining, patient/caregiver education/training, transfer/mobility retraining, strengthening exercise, ROM/therapeutic exercise, occupation/activity based interventions  Therapy Frequency (OT): 5 times/wk  Plan of Care Review  Plan of Care Reviewed With: patient  Progress: no change  Outcome Evaluation: Pt is an 82 yo female admitted with BLE weakness. Recent admission in May 2023 for BLE cellulitis and was recommended d/c to rehab but denied services. Pt seen for OT eval this date, A&Ox4, reports she lives at home with her son, he cannot be home 24/7, reports mod (I) with ADLs and func mob with RWx, reports no falls recently. Today, pt presents below baseline with ADLs, func transfers, act tolerance, generalized weakness, and pain. She completed bed mob with SBA, STS with mod-max A x1, min A x1 for lateral steps to HOB, LBD with max A due to increased pain. Pt will continue to benefit from skilled OT to address noted  functional deficits, rec SNF and pt agreeable.     Time Calculation:   Evaluation Complexity (OT)  Review Occupational Profile/Medical/Therapy History Complexity: expanded/moderate complexity  Assessment, Occupational Performance/Identification of Deficit Complexity: 3-5 performance deficits  Clinical Decision Making Complexity (OT): detailed assessment/moderate complexity  Overall Complexity of Evaluation (OT): moderate complexity     Time Calculation- OT       Row Name 07/22/23 1348             Time Calculation- OT    OT Start Time 1028  -MW      OT Stop Time 1050  -MW      OT Time Calculation (min) 22 min  -MW      Total Timed Code Minutes- OT 15 minute(s)  -MW      OT Received On 07/22/23  -MW      OT - Next Appointment 07/24/23  -MW      OT Goal Re-Cert Due Date 08/05/23  -MW         Timed Charges    44632 - OT Therapeutic Activity Minutes 15  -MW         Untimed Charges    OT Eval/Re-eval Minutes 7  -MW         Total Minutes    Timed Charges Total Minutes 15  -MW      Untimed Charges Total Minutes 7  -MW       Total Minutes 22  -MW                User Key  (r) = Recorded By, (t) = Taken By, (c) = Cosigned By      Initials Name Provider Type     Arcelia Valentin OT Occupational Therapist                  Therapy Charges for Today       Code Description Service Date Service Provider Modifiers Qty    70897300435 HC OT THERAPEUTIC ACT EA 15 MIN 7/22/2023 Arcelia Valentin OT GO 1    15729536134 HC OT EVAL MOD COMPLEXITY 2 7/22/2023 Arcelia Valentin OT GO 1                 Arcelia Valentin OT  7/22/2023

## 2023-07-22 NOTE — PROGRESS NOTES
Name: Halie Guidry ADMIT: 2023   : 1940  PCP: Napoleon Mead MD    MRN: [id number] LOS: 0 days   AGE/SEX: 83 y.o. female  ROOM: [facility]     Subjective   Subjective   She denies any new complaint. Chronic leg and back pain. Asking for Norco. Lying in bed with head elevated.     Objective   Objective   Vital Signs  Temp:  [97.7 øF (36.5 øC)-97.8 øF (36.6 øC)] 97.7 øF (36.5 øC)  Heart Rate:  [54-66] 54  Resp:  [16-18] 16  BP: (143-189)/(56-75) 165/56  SpO2:  [90 %-97 %] 97 %  on   ;   Device (Oxygen Therapy): room air  Body mass index is 29.73 kg/mý.    Physical Exam  Constitutional:       General: She is not in acute distress.     Appearance: Normal appearance. She is not toxic-appearing.   HENT:      Head: Normocephalic and atraumatic.   Eyes:      Extraocular Movements: Extraocular movements intact.   Cardiovascular:      Rate and Rhythm: Normal rate and regular rhythm.   Pulmonary:      Effort: Pulmonary effort is normal. No respiratory distress.      Breath sounds: Normal breath sounds. No wheezing or rhonchi.   Abdominal:      General: Bowel sounds are normal.      Palpations: Abdomen is soft.      Tenderness: There is no abdominal tenderness. There is no guarding or rebound.   Musculoskeletal:         General: Swelling (Venous insufficiency) present.      Cervical back: Normal range of motion.   Skin:     General: Skin is warm and dry.   Neurological:      General: No focal deficit present.      Mental Status: She is alert and oriented to person, place, and time.   Psychiatric:         Mood and Affect: Mood normal.         Behavior: Behavior normal.         Thought Content: Thought content normal.     Results Review  I reviewed the patient's new clinical results.  Results from last 7 days   Lab Units 23  184   WBC 10*3/mm3 8.42 9.02   HEMOGLOBIN g/dL 11.4* 11.4*   PLATELETS 10*3/mm3 286 308     Results from last 7 days   Lab Units 23    SODIUM mmol/L 136 132*   POTASSIUM mmol/L 3.6 4.3   CHLORIDE mmol/L 101 100   CO2 mmol/L 25.0 20.6*   BUN mg/dL 27* 29*   CREATININE mg/dL 1.13* 1.13*   GLUCOSE mg/dL 278* 336*     Lab Results   Component Value Date    ANIONGAP 10.0 07/22/2023     Estimated Creatinine Clearance: 43.9 mL/min (A) (by C-G formula based on SCr of 1.13 mg/dL (H)).    Results from last 7 days   Lab Units 07/21/23  1846   ALBUMIN g/dL 3.9   BILIRUBIN mg/dL 0.4   ALK PHOS U/L 109   AST (SGOT) U/L 18   ALT (SGPT) U/L 10     Results from last 7 days   Lab Units 07/22/23  0412 07/21/23  1846   CALCIUM mg/dL 9.0 8.7   ALBUMIN g/dL  --  3.9       Glucose   Date/Time Value Ref Range Status   07/21/2023 2311 345 (H) 70 - 130 mg/dL Final     Comment:     Meter: [id number] : [id number] [employer] NA       XR Chest 1 View    Result Date: 7/21/2023  There is mild right lung base atelectasis versus pneumonia. Soft tissue fullness in the region of the right hilum is also suspected. A follow-up CT of the chest with contrast is suggested.  This report was finalized on 7/21/2023 8:02 PM by Dr. Steven Garcia M.D.       Scheduled Meds  amLODIPine, 5 mg, Oral, Daily  atorvastatin, 80 mg, Oral, Nightly  bisoprolol, 5 mg, Oral, Daily  chlorthalidone, 25 mg, Oral, Daily  DULoxetine, 30 mg, Oral, Daily  insulin glargine, 65 Units, Subcutaneous, Daily  insulin lispro, 3-14 Units, Subcutaneous, 4x Daily With Meals & Nightly  insulin lispro, 8 Units, Subcutaneous, TID With Meals  levothyroxine, 112 mcg, Oral, Once per day on Mon Tue Wed Thu Fri Sat  [START ON 7/23/2023] levothyroxine, 168 mcg, Oral, Once per day on Sun  lisinopril, 40 mg, Oral, Daily  pantoprazole, 40 mg, Oral, Q AM  pregabalin, 50 mg, Oral, TID  senna-docusate sodium, 2 tablet, Oral, BID  sodium chloride, 10 mL, Intravenous, Q12H  terazosin, 1 mg, Oral, Nightly  cyanocobalamin, 1,000 mcg, Oral, Daily    Continuous Infusions   PRN Meds    senna-docusate sodium **AND** polyethylene  glycol **AND** bisacodyl **AND** bisacodyl    dextrose    dextrose    glucagon (human recombinant)    HYDROcodone-acetaminophen    sodium chloride    sodium chloride     Diet  Diet: Cardiac Diets, Diabetic Diets; Healthy Heart (2-3 Na+); Consistent Carbohydrate; Texture: Regular Texture (IDDSI 7); Fluid Consistency: Thin (IDDSI 0)    I have personally reviewed:  [x]  Medications  [x]  Laboratory   []  Microbiology   [x]  Radiology atelectasis versus pneumonia. Right hilar fullness recommend CT with contrast  [x]  EKG/Telemetry RBBB no change  []  Cardiology/Vascular   []  Pathology   [x]  Records      Assessment/Plan     Active Hospital Problems    Diagnosis  POA    **Bilateral leg pain [M79.604, M79.605]  Yes    Hypertensive kidney disease with stage 3a chronic kidney disease [I12.9, N18.31]  Yes    Anxiety disorder [F41.9]  Yes    Noncompliance with medication regimen [Z91.148]  Not Applicable    Hyperlipidemia [E78.5]  Yes    Primary hypothyroidism [E03.9]  Yes    Type 2 diabetes mellitus with hyperglycemia, with long-term current use of insulin [E11.65, Z79.4]  Not Applicable    Benign essential hypertension [I10]  Yes      Resolved Hospital Problems   No resolved problems to display.     83 y.o. female with history of diabetes, neuropathy, diabetic foot ulcer, hypertension, hyperlipidemia, hypothyroidism, cognitive impairment, recurrent UTIs presented with bilateral lower extremity swelling and pain since May and decreased activity due to pain. She was in the hospital in May for lower extremity cellulitis declined SNF recommendation.    Leg pain  Venous insufficiency  -Outpatient Doppler negative for DVT    Abnormal chest x-ray  -Radiology recommends CT with contrast  -Monitor renal function after contrast    DM2 with neuropathy  -Has been noncompliant as an outpatient  -A1c 11.80% last month  -Continue long-acting, mealtime, and correctional insulin will likely need adjustments    Hypertension  -Continue  medications  -Not at goal but acceptable monitor for now may adjust    Hyperlipidemia  -Statin    Hypothyroidism  -Levothyroxine    CKD 3A  -Continue to monitor    SCDs for DVT prophylaxis    Discussed with patient and nursing staff    Discharge: Probably will need SNF    [facility] Calvin Moses MD  [facility]  07/22/23

## 2023-07-22 NOTE — PLAN OF CARE
Goal Outcome Evaluation:  Plan of Care Reviewed With: patient           Outcome Evaluation: Pt arrived via ED from home c/o not being able to walk due to swelling and increased pain to BLE. Pt A/Ox4,  unable to ambulate without pain & weakness at this time. Voiding via PW. Pt rates pain around 4/10. No pain medication on chart; however pt takes Norco 5 q6 PRN at home.  MD contacted about restarting pt pain medication per pt request.  Pt has DM II dx, with SS and scheduled insulin. Ulcer noted to L lateral foot noted upon admission, wound care consulted for evaluation & treatment.  Redness & excoriation noted under breasts and groin area, BLE red and warm to touch, with moderate edema  noted.   SCD's & accumax in place.  Treatment plan pending at this time.

## 2023-07-23 ENCOUNTER — APPOINTMENT (OUTPATIENT)
Dept: GENERAL RADIOLOGY | Facility: HOSPITAL | Age: 83
End: 2023-07-23
Payer: MEDICARE

## 2023-07-23 LAB
ANION GAP SERPL CALCULATED.3IONS-SCNC: 13 MMOL/L (ref 5–15)
BUN SERPL-MCNC: 36 MG/DL (ref 8–23)
BUN/CREAT SERPL: 27.7 (ref 7–25)
CALCIUM SPEC-SCNC: 9.1 MG/DL (ref 8.6–10.5)
CHLORIDE SERPL-SCNC: 101 MMOL/L (ref 98–107)
CO2 SERPL-SCNC: 23 MMOL/L (ref 22–29)
CREAT SERPL-MCNC: 1.3 MG/DL (ref 0.57–1)
DEPRECATED RDW RBC AUTO: 42.9 FL (ref 37–54)
EGFRCR SERPLBLD CKD-EPI 2021: 40.9 ML/MIN/1.73
ERYTHROCYTE [DISTWIDTH] IN BLOOD BY AUTOMATED COUNT: 14 % (ref 12.3–15.4)
GLUCOSE BLDC GLUCOMTR-MCNC: 100 MG/DL (ref 70–130)
GLUCOSE BLDC GLUCOMTR-MCNC: 201 MG/DL (ref 70–130)
GLUCOSE BLDC GLUCOMTR-MCNC: 261 MG/DL (ref 70–130)
GLUCOSE BLDC GLUCOMTR-MCNC: 296 MG/DL (ref 70–130)
GLUCOSE SERPL-MCNC: 282 MG/DL (ref 65–99)
HCT VFR BLD AUTO: 33.7 % (ref 34–46.6)
HGB BLD-MCNC: 11.1 G/DL (ref 12–15.9)
MCH RBC QN AUTO: 27.7 PG (ref 26.6–33)
MCHC RBC AUTO-ENTMCNC: 32.9 G/DL (ref 31.5–35.7)
MCV RBC AUTO: 84 FL (ref 79–97)
PLATELET # BLD AUTO: 291 10*3/MM3 (ref 140–450)
PMV BLD AUTO: 9.7 FL (ref 6–12)
POTASSIUM SERPL-SCNC: 4.2 MMOL/L (ref 3.5–5.2)
RBC # BLD AUTO: 4.01 10*6/MM3 (ref 3.77–5.28)
SODIUM SERPL-SCNC: 137 MMOL/L (ref 136–145)
WBC NRBC COR # BLD: 8.77 10*3/MM3 (ref 3.4–10.8)

## 2023-07-23 PROCEDURE — 73560 X-RAY EXAM OF KNEE 1 OR 2: CPT

## 2023-07-23 PROCEDURE — 63710000001 INSULIN LISPRO (HUMAN) PER 5 UNITS: Performed by: NURSE PRACTITIONER

## 2023-07-23 PROCEDURE — 85027 COMPLETE CBC AUTOMATED: CPT | Performed by: HOSPITALIST

## 2023-07-23 PROCEDURE — 63710000001 INSULIN LISPRO (HUMAN) PER 5 UNITS: Performed by: HOSPITALIST

## 2023-07-23 PROCEDURE — 63710000001 INSULIN GLARGINE PER 5 UNITS: Performed by: NURSE PRACTITIONER

## 2023-07-23 PROCEDURE — 73590 X-RAY EXAM OF LOWER LEG: CPT

## 2023-07-23 PROCEDURE — 80048 BASIC METABOLIC PNL TOTAL CA: CPT | Performed by: HOSPITALIST

## 2023-07-23 PROCEDURE — 82948 REAGENT STRIP/BLOOD GLUCOSE: CPT

## 2023-07-23 PROCEDURE — G0378 HOSPITAL OBSERVATION PER HR: HCPCS

## 2023-07-23 PROCEDURE — 73610 X-RAY EXAM OF ANKLE: CPT

## 2023-07-23 RX ORDER — INSULIN LISPRO 100 [IU]/ML
12 INJECTION, SOLUTION INTRAVENOUS; SUBCUTANEOUS
Status: DISCONTINUED | OUTPATIENT
Start: 2023-07-23 | End: 2023-07-24

## 2023-07-23 RX ADMIN — BISOPROLOL FUMARATE 5 MG: 5 TABLET, FILM COATED ORAL at 08:12

## 2023-07-23 RX ADMIN — DULOXETINE HYDROCHLORIDE 30 MG: 30 CAPSULE, DELAYED RELEASE ORAL at 08:12

## 2023-07-23 RX ADMIN — HYDROCODONE BITARTRATE AND ACETAMINOPHEN 1 TABLET: 5; 325 TABLET ORAL at 21:38

## 2023-07-23 RX ADMIN — AMLODIPINE BESYLATE 5 MG: 5 TABLET ORAL at 08:12

## 2023-07-23 RX ADMIN — INSULIN GLARGINE 65 UNITS: 100 INJECTION, SOLUTION SUBCUTANEOUS at 08:11

## 2023-07-23 RX ADMIN — Medication 1000 MCG: at 08:12

## 2023-07-23 RX ADMIN — ATORVASTATIN CALCIUM 80 MG: 20 TABLET, FILM COATED ORAL at 21:37

## 2023-07-23 RX ADMIN — INSULIN LISPRO 8 UNITS: 100 INJECTION, SOLUTION INTRAVENOUS; SUBCUTANEOUS at 08:18

## 2023-07-23 RX ADMIN — HYDROCODONE BITARTRATE AND ACETAMINOPHEN 1 TABLET: 5; 325 TABLET ORAL at 15:33

## 2023-07-23 RX ADMIN — TERAZOSIN 1 MG: 1 CAPSULE ORAL at 21:47

## 2023-07-23 RX ADMIN — PREGABALIN 50 MG: 50 CAPSULE ORAL at 21:37

## 2023-07-23 RX ADMIN — Medication 10 ML: at 21:47

## 2023-07-23 RX ADMIN — Medication 10 ML: at 08:12

## 2023-07-23 RX ADMIN — CHLORTHALIDONE 25 MG: 25 TABLET ORAL at 08:12

## 2023-07-23 RX ADMIN — INSULIN LISPRO 5 UNITS: 100 INJECTION, SOLUTION INTRAVENOUS; SUBCUTANEOUS at 00:00

## 2023-07-23 RX ADMIN — INSULIN LISPRO 8 UNITS: 100 INJECTION, SOLUTION INTRAVENOUS; SUBCUTANEOUS at 12:17

## 2023-07-23 RX ADMIN — LISINOPRIL 40 MG: 40 TABLET ORAL at 08:12

## 2023-07-23 RX ADMIN — INSULIN LISPRO 8 UNITS: 100 INJECTION, SOLUTION INTRAVENOUS; SUBCUTANEOUS at 08:11

## 2023-07-23 RX ADMIN — HYDROCODONE BITARTRATE AND ACETAMINOPHEN 1 TABLET: 5; 325 TABLET ORAL at 08:18

## 2023-07-23 RX ADMIN — INSULIN LISPRO 12 UNITS: 100 INJECTION, SOLUTION INTRAVENOUS; SUBCUTANEOUS at 17:39

## 2023-07-23 RX ADMIN — INSULIN LISPRO 5 UNITS: 100 INJECTION, SOLUTION INTRAVENOUS; SUBCUTANEOUS at 21:37

## 2023-07-23 RX ADMIN — LEVOTHYROXINE SODIUM 168 MCG: 0.11 TABLET ORAL at 06:00

## 2023-07-23 RX ADMIN — PREGABALIN 50 MG: 50 CAPSULE ORAL at 15:33

## 2023-07-23 RX ADMIN — PANTOPRAZOLE SODIUM 40 MG: 40 TABLET, DELAYED RELEASE ORAL at 05:43

## 2023-07-23 RX ADMIN — INSULIN LISPRO 12 UNITS: 100 INJECTION, SOLUTION INTRAVENOUS; SUBCUTANEOUS at 12:17

## 2023-07-23 RX ADMIN — PREGABALIN 50 MG: 50 CAPSULE ORAL at 08:13

## 2023-07-23 NOTE — PLAN OF CARE
Goal Outcome Evaluation:  Plan of Care Reviewed With: patient        Progress: no change  Outcome Evaluation: Received from [facility] this evening. Redness present to coccyx; excoriation to abdominal folds, marquez area, and under breasts; diabetic ulcer left great toe - dressing CDI. Purewick in place. Accumax pump to bed. SCDs on. Monitoring blood sugars - insulin as ordered. CT chest ordered. Patient able to turn self well in the bed. Heart rate hermelindo at times. Will continue to monitor.

## 2023-07-23 NOTE — PLAN OF CARE
Goal Outcome Evaluation:  Plan of Care Reviewed With: patient        Progress: improving  Outcome Evaluation: VSS. PT has been voiding per lc and PRN pain meds given as per order. Educated on falls precaution, b/p and blood glucose monitoring and medication management which needs reinforcement. Plan d/c to SNF pending. Will cont with current plan of care.

## 2023-07-23 NOTE — CASE MANAGEMENT/SOCIAL WORK
Continued Stay Note  [facility]     Patient Name: Halie Guidry  MRN: [id number]  Today's Date: 7/23/2023    Admit Date: 7/21/2023    Plan: Patient plans to discharge to sub-acute rehab, with goal of returning home at prior level of function. Referral sent to [facility], per patient request. Will likely require ambulance transport upon discharge.   Discharge Plan       Row Name 07/23/23 1550       Plan    Plan Comments Spoke with Mame/South for [facility]--she states referral was sent in late Friday and she had not sent in for benefits check. Mame states she will do that first thing on Monday and follow up with [facility] on [facility]. CCP to follow.......JW                   Discharge Codes    No documentation.                       Meena Haddad RN

## 2023-07-23 NOTE — PROGRESS NOTES
Name: Halie Guidry ADMIT: 2023   : 1940  PCP: Napoleon Mead MD    MRN: [id number] LOS: 0 days   AGE/SEX: 83 y.o. female  ROOM: [facility]     Subjective   Subjective   Chronic pain but also complaining of right lower leg pain deep from knee to ankle.     Objective   Objective   Vital Signs  Temp:  [97.4 øF (36.3 øC)-98.3 øF (36.8 øC)] 98.3 øF (36.8 øC)  Heart Rate:  [53-84] 68  Resp:  [16] 16  BP: (137-162)/(54-78) 162/54  SpO2:  [94 %-99 %] 97 %  on   ;   Device (Oxygen Therapy): room air  Body mass index is 29.97 kg/mý.    Physical Exam  Constitutional:       General: She is not in acute distress.     Appearance: She is not toxic-appearing.   HENT:      Head: Normocephalic and atraumatic.   Eyes:      Extraocular Movements: Extraocular movements intact.   Cardiovascular:      Rate and Rhythm: Normal rate and regular rhythm.   Pulmonary:      Effort: Pulmonary effort is normal. No respiratory distress.      Breath sounds: Normal breath sounds. No wheezing or rhonchi.   Abdominal:      General: Bowel sounds are normal.      Palpations: Abdomen is soft.      Tenderness: There is no abdominal tenderness. There is no guarding or rebound.   Musculoskeletal:         General: Swelling (Venous insufficiency) present.      Cervical back: Normal range of motion.   Skin:     General: Skin is warm and dry.   Neurological:      General: No focal deficit present.      Mental Status: She is alert and oriented to person, place, and time.   Psychiatric:         Mood and Affect: Mood normal.         Behavior: Behavior normal.         Thought Content: Thought content normal.     Results Review  I reviewed the patient's new clinical results.  Results from last 7 days   Lab Units 23  0404 232 23  1846   WBC 10*3/mm3 8.77 8.42 9.02   HEMOGLOBIN g/dL 11.1* 11.4* 11.4*   PLATELETS 10*3/mm3 291 286 308       Results from last 7 days   Lab Units 23  0404 23  0412 23  1846    SODIUM mmol/L 137 136 132*   POTASSIUM mmol/L 4.2 3.6 4.3   CHLORIDE mmol/L 101 101 100   CO2 mmol/L 23.0 25.0 20.6*   BUN mg/dL 36* 27* 29*   CREATININE mg/dL 1.30* 1.13* 1.13*   GLUCOSE mg/dL 282* 278* 336*       Lab Results   Component Value Date    ANIONGAP 13.0 07/23/2023     Estimated Creatinine Clearance: 38.4 mL/min (A) (by C-G formula based on SCr of 1.3 mg/dL (H)).    Results from last 7 days   Lab Units 07/21/23  1846   ALBUMIN g/dL 3.9   BILIRUBIN mg/dL 0.4   ALK PHOS U/L 109   AST (SGOT) U/L 18   ALT (SGPT) U/L 10       Results from last 7 days   Lab Units 07/23/23  0404 07/22/23  0412 07/21/23  1846   CALCIUM mg/dL 9.1 9.0 8.7   ALBUMIN g/dL  --   --  3.9         Glucose   Date/Time Value Ref Range Status   07/23/2023 0739 296 (H) 70 - 130 mg/dL Final     Comment:     Meter: [id number] : [id number] Danis Rios    07/22/2023 2117 210 (H) 70 - 130 mg/dL Final     Comment:     Meter: [id number] : [id number] Judalejandra Keller    07/22/2023 1603 176 (H) 70 - 130 mg/dL Final     Comment:     Meter: [id number] : [id number] Danis Gabriel    07/22/2023 1122 346 (H) 70 - 130 mg/dL Final     Comment:     Meter: [id number] : [id number] Danis Rios    07/22/2023 0755 408 (C) 70 - 130 mg/dL Final     Comment:     RN Notified R and V Meter: [id number] : [id number] Danis Gabriel    07/21/2023 2311 345 (H) 70 - 130 mg/dL Final     Comment:     Meter: [id number] : [id number] Sam HERNANDEZ       XR Chest 1 View    Result Date: 7/21/2023  There is mild right lung base atelectasis versus pneumonia. Soft tissue fullness in the region of the right hilum is also suspected. A follow-up CT of the chest with contrast is suggested.  This report was finalized on 7/21/2023 8:02 PM by Dr. Steven Garcia M.D.       Scheduled Meds  amLODIPine, 5 mg, Oral, Daily  atorvastatin, 80 mg, Oral, Nightly  bisoprolol, 5 mg, Oral, Daily  chlorthalidone, 25 mg, Oral, Daily  DULoxetine, 30 mg, Oral,  Daily  insulin glargine, 65 Units, Subcutaneous, Daily  insulin lispro, 3-14 Units, Subcutaneous, 4x Daily With Meals & Nightly  insulin lispro, 8 Units, Subcutaneous, TID With Meals  levothyroxine, 112 mcg, Oral, Once per day on Mon Tue Wed Thu Fri Sat  levothyroxine, 168 mcg, Oral, Once per day on Sun  lisinopril, 40 mg, Oral, Daily  pantoprazole, 40 mg, Oral, Q AM  pregabalin, 50 mg, Oral, TID  senna-docusate sodium, 2 tablet, Oral, BID  sodium chloride, 10 mL, Intravenous, Q12H  terazosin, 1 mg, Oral, Nightly  cyanocobalamin, 1,000 mcg, Oral, Daily    Continuous Infusions   PRN Meds    senna-docusate sodium **AND** polyethylene glycol **AND** bisacodyl **AND** bisacodyl    dextrose    dextrose    glucagon (human recombinant)    HYDROcodone-acetaminophen    sodium chloride    sodium chloride     Diet  Diet: Cardiac Diets, Diabetic Diets; Healthy Heart (2-3 Na+); Consistent Carbohydrate; Texture: Regular Texture (IDDSI 7); Fluid Consistency: Thin (IDDSI 0)    I have personally reviewed:  [x]  Medications  [x]  Laboratory   []  Microbiology   []  Radiology  []  EKG/Telemetry   []  Cardiology/Vascular   []  Pathology   []  Records      Assessment/Plan     Active Hospital Problems    Diagnosis  POA    **Bilateral leg pain [M79.604, M79.605]  Yes    Hypertensive kidney disease with stage 3a chronic kidney disease [I12.9, N18.31]  Yes    Anxiety disorder [F41.9]  Yes    Noncompliance with medication regimen [Z91.148]  Not Applicable    Hyperlipidemia [E78.5]  Yes    Primary hypothyroidism [E03.9]  Yes    Type 2 diabetes mellitus with hyperglycemia, with long-term current use of insulin [E11.65, Z79.4]  Not Applicable    Benign essential hypertension [I10]  Yes      Resolved Hospital Problems   No resolved problems to display.     83 y.o. female with history of diabetes, neuropathy, diabetic foot ulcer, hypertension, hyperlipidemia, hypothyroidism, cognitive impairment, recurrent UTIs presented with  bilateral lower extremity swelling and pain since May and decreased activity due to pain. She was in the hospital in May for lower extremity cellulitis declined SNF recommendation.    Leg pain  Venous insufficiency  -Outpatient Doppler negative for DVT  -Check x-ray right lower leg    Abnormal chest x-ray  -Radiology recommends CT with contrast (still pending)  -Monitor renal function after contrast    DM2 with neuropathy  -Has been noncompliant as an outpatient  -A1c 11.80% last month  -Adjust long-acting and mealtime insulin. Continue correctional insulin     Hypertension  -Continue medications  -Not at goal but acceptable monitor for now may adjust    Hyperlipidemia  -Statin    Hypothyroidism  -Levothyroxine    CKD 3A  -Continue to monitor    SCDs for DVT prophylaxis    Discussed with patient and nursing staff    Discharge: Probably will need SNF. Therapies following    Efe Moses MD  [facility]  07/23/23

## 2023-07-24 ENCOUNTER — APPOINTMENT (OUTPATIENT)
Dept: CT IMAGING | Facility: HOSPITAL | Age: 83
End: 2023-07-24
Payer: MEDICARE

## 2023-07-24 LAB
ANION GAP SERPL CALCULATED.3IONS-SCNC: 12.1 MMOL/L (ref 5–15)
BUN SERPL-MCNC: 38 MG/DL (ref 8–23)
BUN/CREAT SERPL: 31.4 (ref 7–25)
CALCIUM SPEC-SCNC: 9.1 MG/DL (ref 8.6–10.5)
CHLORIDE SERPL-SCNC: 101 MMOL/L (ref 98–107)
CO2 SERPL-SCNC: 20.9 MMOL/L (ref 22–29)
CREAT SERPL-MCNC: 1.21 MG/DL (ref 0.57–1)
DEPRECATED RDW RBC AUTO: 42.5 FL (ref 37–54)
EGFRCR SERPLBLD CKD-EPI 2021: 44.6 ML/MIN/1.73
ERYTHROCYTE [DISTWIDTH] IN BLOOD BY AUTOMATED COUNT: 13.9 % (ref 12.3–15.4)
GLUCOSE BLDC GLUCOMTR-MCNC: 167 MG/DL (ref 70–130)
GLUCOSE BLDC GLUCOMTR-MCNC: 186 MG/DL (ref 70–130)
GLUCOSE BLDC GLUCOMTR-MCNC: 211 MG/DL (ref 70–130)
GLUCOSE BLDC GLUCOMTR-MCNC: 345 MG/DL (ref 70–130)
GLUCOSE BLDC GLUCOMTR-MCNC: 365 MG/DL (ref 70–130)
GLUCOSE SERPL-MCNC: 206 MG/DL (ref 65–99)
HCT VFR BLD AUTO: 35.6 % (ref 34–46.6)
HGB BLD-MCNC: 11.5 G/DL (ref 12–15.9)
MCH RBC QN AUTO: 27.1 PG (ref 26.6–33)
MCHC RBC AUTO-ENTMCNC: 32.3 G/DL (ref 31.5–35.7)
MCV RBC AUTO: 84 FL (ref 79–97)
PLATELET # BLD AUTO: 276 10*3/MM3 (ref 140–450)
PMV BLD AUTO: 9.9 FL (ref 6–12)
POTASSIUM SERPL-SCNC: 3.9 MMOL/L (ref 3.5–5.2)
RBC # BLD AUTO: 4.24 10*6/MM3 (ref 3.77–5.28)
SODIUM SERPL-SCNC: 134 MMOL/L (ref 136–145)
WBC NRBC COR # BLD: 8.46 10*3/MM3 (ref 3.4–10.8)

## 2023-07-24 PROCEDURE — 63710000001 INSULIN GLARGINE PER 5 UNITS: Performed by: HOSPITALIST

## 2023-07-24 PROCEDURE — 80048 BASIC METABOLIC PNL TOTAL CA: CPT | Performed by: HOSPITALIST

## 2023-07-24 PROCEDURE — 71260 CT THORAX DX C+: CPT

## 2023-07-24 PROCEDURE — G0378 HOSPITAL OBSERVATION PER HR: HCPCS

## 2023-07-24 PROCEDURE — 97530 THERAPEUTIC ACTIVITIES: CPT

## 2023-07-24 PROCEDURE — 82948 REAGENT STRIP/BLOOD GLUCOSE: CPT

## 2023-07-24 PROCEDURE — 25510000001 IOPAMIDOL 61 % SOLUTION: Performed by: HOSPITALIST

## 2023-07-24 PROCEDURE — 63710000001 INSULIN LISPRO (HUMAN) PER 5 UNITS: Performed by: HOSPITALIST

## 2023-07-24 PROCEDURE — 29581 APPL MULTLAYER CMPRN SYS LEG: CPT

## 2023-07-24 PROCEDURE — 85027 COMPLETE CBC AUTOMATED: CPT | Performed by: HOSPITALIST

## 2023-07-24 PROCEDURE — 63710000001 INSULIN LISPRO (HUMAN) PER 5 UNITS: Performed by: NURSE PRACTITIONER

## 2023-07-24 RX ORDER — INSULIN LISPRO 100 [IU]/ML
18 INJECTION, SOLUTION INTRAVENOUS; SUBCUTANEOUS
Status: DISCONTINUED | OUTPATIENT
Start: 2023-07-24 | End: 2023-07-31 | Stop reason: HOSPADM

## 2023-07-24 RX ORDER — CHOLECALCIFEROL (VITAMIN D3) 125 MCG
5 CAPSULE ORAL NIGHTLY PRN
Status: DISCONTINUED | OUTPATIENT
Start: 2023-07-24 | End: 2023-07-31 | Stop reason: HOSPADM

## 2023-07-24 RX ADMIN — ATORVASTATIN CALCIUM 80 MG: 20 TABLET, FILM COATED ORAL at 20:25

## 2023-07-24 RX ADMIN — BISOPROLOL FUMARATE 5 MG: 5 TABLET, FILM COATED ORAL at 10:10

## 2023-07-24 RX ADMIN — LISINOPRIL 40 MG: 40 TABLET ORAL at 10:10

## 2023-07-24 RX ADMIN — DULOXETINE HYDROCHLORIDE 30 MG: 30 CAPSULE, DELAYED RELEASE ORAL at 10:10

## 2023-07-24 RX ADMIN — INSULIN LISPRO 5 UNITS: 100 INJECTION, SOLUTION INTRAVENOUS; SUBCUTANEOUS at 10:11

## 2023-07-24 RX ADMIN — AMLODIPINE BESYLATE 5 MG: 5 TABLET ORAL at 10:10

## 2023-07-24 RX ADMIN — PANTOPRAZOLE SODIUM 40 MG: 40 TABLET, DELAYED RELEASE ORAL at 05:41

## 2023-07-24 RX ADMIN — Medication 1000 MCG: at 10:10

## 2023-07-24 RX ADMIN — IOPAMIDOL 75 ML: 612 INJECTION, SOLUTION INTRAVENOUS at 08:00

## 2023-07-24 RX ADMIN — TERAZOSIN 1 MG: 1 CAPSULE ORAL at 20:25

## 2023-07-24 RX ADMIN — INSULIN LISPRO 3 UNITS: 100 INJECTION, SOLUTION INTRAVENOUS; SUBCUTANEOUS at 21:43

## 2023-07-24 RX ADMIN — PREGABALIN 50 MG: 50 CAPSULE ORAL at 22:25

## 2023-07-24 RX ADMIN — INSULIN LISPRO 12 UNITS: 100 INJECTION, SOLUTION INTRAVENOUS; SUBCUTANEOUS at 12:10

## 2023-07-24 RX ADMIN — HYDROCODONE BITARTRATE AND ACETAMINOPHEN 1 TABLET: 5; 325 TABLET ORAL at 18:18

## 2023-07-24 RX ADMIN — Medication 5 MG: at 21:14

## 2023-07-24 RX ADMIN — INSULIN LISPRO 3 UNITS: 100 INJECTION, SOLUTION INTRAVENOUS; SUBCUTANEOUS at 18:16

## 2023-07-24 RX ADMIN — CHLORTHALIDONE 25 MG: 25 TABLET ORAL at 10:10

## 2023-07-24 RX ADMIN — INSULIN GLARGINE 70 UNITS: 100 INJECTION, SOLUTION SUBCUTANEOUS at 10:10

## 2023-07-24 RX ADMIN — PREGABALIN 50 MG: 50 CAPSULE ORAL at 18:16

## 2023-07-24 RX ADMIN — Medication 10 ML: at 10:11

## 2023-07-24 RX ADMIN — HYDROCODONE BITARTRATE AND ACETAMINOPHEN 1 TABLET: 5; 325 TABLET ORAL at 10:16

## 2023-07-24 RX ADMIN — POLYETHYLENE GLYCOL 3350 17 G: 17 POWDER, FOR SOLUTION ORAL at 21:44

## 2023-07-24 RX ADMIN — PREGABALIN 50 MG: 50 CAPSULE ORAL at 10:16

## 2023-07-24 RX ADMIN — ZINC OXIDE 1 APPLICATION: 200 OINTMENT TOPICAL at 20:27

## 2023-07-24 RX ADMIN — INSULIN LISPRO 12 UNITS: 100 INJECTION, SOLUTION INTRAVENOUS; SUBCUTANEOUS at 10:11

## 2023-07-24 RX ADMIN — INSULIN LISPRO 18 UNITS: 100 INJECTION, SOLUTION INTRAVENOUS; SUBCUTANEOUS at 18:16

## 2023-07-24 RX ADMIN — Medication 10 ML: at 20:29

## 2023-07-24 RX ADMIN — SENNOSIDES AND DOCUSATE SODIUM 2 TABLET: 50; 8.6 TABLET ORAL at 20:25

## 2023-07-24 RX ADMIN — LEVOTHYROXINE SODIUM 112 MCG: 0.11 TABLET ORAL at 05:41

## 2023-07-24 RX ADMIN — SENNOSIDES AND DOCUSATE SODIUM 2 TABLET: 50; 8.6 TABLET ORAL at 10:10

## 2023-07-24 NOTE — PROGRESS NOTES
Continued Stay Note  [facility]     Patient Name: Halie Guidry  MRN: [id number]  Today's Date: 7/24/2023    Admit Date: 7/21/2023    Plan: [facility] Alfonso   Discharge Plan       Row Name 07/24/23 1556       Plan    Plan [facility] Al[facility]so    Plan Comments Updated patient and RN on pre-cert obtained for Charli Alfonso. Patient stated not sure but poss her son would provide transportation at [place].      Row Name 07/24/23 1907       Plan    Plan [facility]li Alfonso precert approved    Plan Comments Precert submitted and approved for [facility] Alfonso for admission 7/24/2023.  CCP and liaison notified.  Approval faxed to [facility] Dept.  Kamilah QUIJANO-PAUL                   Discharge Codes    No documentation.                 Expected Discharge Date and Time       Expected Discharge Date Expected Discharge Time    Jul 25, 2023               Vannessa Mcdowell RN

## 2023-07-24 NOTE — NURSING NOTE
07/24/23 1017   Wound 07/21/23 2259 lateral foot Diabetic Ulcer   Placement Date/Time: 07/21/23 2259   Present on Hospital Admission: Yes  Orientation: lateral  Location: foot  Primary Wound Type: Diabetic Ulcer   Base dry;red;yellow;scab   Periwound intact;dry  (callous)   Periwound Temperature warm   Periwound Skin Turgor firm   Wound Length (cm) 1 cm   Wound Width (cm) 1 cm   Wound Depth (cm) 0.2 cm   Wound Surface Area (cm^2) 1 cm^2   Wound Volume (cm^3) 0.2 cm^3   Drainage Characteristics/Odor serosanguineous   Drainage Amount scant   Care, Wound cleansed with;antimicrobial agent applied   Dressing Care dressing changed  (Betadine wet to moist 2x2 placed, covered with Optifoam)   Periwound Care dry periwound area maintained   Skin Interventions   Pressure Reduction Devices alternating pressure pump (ADD);pressure-redistributing mattress utilized   Pressure Reduction Techniques heels elevated off bed;positioned off wounds;pressure points protected     CWON note: pt seen for evaluation of skin issues POA. Pt is alert and oriented, able to turn and reposition with some assistance. She is currently on an accumax mattress with added pump for adequate pressure redistribution. She is incontinent of bowel and bladder, MASD noted with some scruffiness to her sacral skin. Barrier cream to be applied 4x day. She is noted to have intertriginous dermatitis with yeast component under breast, under panus, and in her groin. Magic barrier cream has been ordered BID, pillow cases can be placed to wick moisture between fold. Left plantar foot wound redressed with betadine wet to moist dressing. All wound care and prevention standing orders have been placed into Epic. Discussed with RN. Please re-consult for any additional needs.

## 2023-07-24 NOTE — THERAPY TREATMENT NOTE
Patient Name: Halie Guidry  : 1940    MRN: [id number]                              Today's Date: 2023       Admit Date: 2023    Visit Dx:     ICD-10-CM ICD-9-CM   1. Bilateral leg pain  M79.604 729.5    M79.605    2. Unable to ambulate  R26.2 719.7   3. Peripheral edema  R60.9 782.3     Patient Active Problem List   Diagnosis    Compression fracture    Lumbar degenerative disc disease    Type 2 diabetes mellitus with hyperglycemia, with long-term current use of insulin    Hyperlipidemia    Benign essential hypertension    Primary hypothyroidism    Leukocytosis    Neuropathy    Osteoporosis    Proteinuria    Tobacco abuse    Diabetic eye exam    Generalized weakness    Spinal stenosis    Scoliosis    Arthritis    VBI (vertebrobasilar insufficiency)    Orthostatic hypotension    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    Severe hypothyroidism    Noncompliance with medication regimen    Vitamin D deficiency disease    Altered mental status    Tremor    Seizure    Stage 3a chronic kidney disease    Thoracic degenerative disc disease    Metabolic encephalopathy    VIPUL (acute kidney injury)    Hyperglycemia    Type II diabetes mellitus, uncontrolled    Lower abdominal pain    Transaminitis    COVID-19 virus detected    UTI (urinary tract infection)    Emphysematous cystitis    Choledocholithiasis    Hypokalemia    Hypoxia    Generalized abdominal pain    History of Clostridium difficile infection    History of ERCP    Acute UTI (urinary tract infection)    Hypomagnesemia    Burning pain    Anxiety disorder    Neuropathic pain    Intertrigo    Weakness of both lower extremities    Accelerated hypertension    Acute cystitis without hematuria    Altered mental status, unspecified altered mental status type    Left lower lobe pneumonia    Acute pain of left foot    Cellulitis and abscess of left lower extremity    Hypertensive kidney disease with stage 3a chronic kidney disease     Bilateral leg pain     Past Medical History:   Diagnosis Date    Acute metabolic encephalopathy 6/24/2022    Anxiety     Arthritis     Benign essential hypertension 08/20/2014    Bleeding disorder     Depression     Diabetes     Diabetes mellitus, type 2     Disc degeneration, lumbar     Headache, tension-type     Hyperlipidemia     Hypothyroidism     Neuropathy     Osteoporosis 09/09/2015    Peripheral neuropathy     Rotator cuff tear, left     Scoliosis     Shoulder pain     LEFT, TORN ROTATOR CUFF S/P FALL    Spinal stenosis      Past Surgical History:   Procedure Laterality Date    APPENDECTOMY      BILATERAL BREAST REDUCTION Bilateral 08/2015    CATARACT EXTRACTION  03/2015    CHOLECYSTECTOMY WITH INTRAOPERATIVE CHOLANGIOGRAM N/A 3/27/2022    Procedure: CHOLECYSTECTOMY LAPAROSCOPIC INTRAOPERATIVE CHOLANGIOGRAM;  Surgeon: Aiyana Hill MD;  Location: [facility] MAIN OR;  Service: General;  Laterality: N/A;    COLONOSCOPY  06/05/2015    WNL    ERCP N/A 2/25/2022    Procedure: ENDOSCOPIC RETROGRADE CHOLANGIOPANCREATOGRAPHY with sphincterotomy and balloon sweep;  Surgeon: Chilo Wilhelm MD;  Location: [facility] ENDOSCOPY;  Service: Gastroenterology;  Laterality: N/A;  PRE/POST - CBD stones    ERCP N/A 3/28/2022    Procedure: ENDOSCOPIC RETROGRADE CHOLANGIOPANCREATOGRAPHY WITH SPHINCTEROTOMY AND BALLOON SWEEP;  Surgeon: Chilo Wilhelm MD;  Location: [facility] ENDOSCOPY;  Service: Gastroenterology;  Laterality: N/A;  PRE: COMMON DUCT STONE  POST: COMMON DUCT STONE    KYPHOPLASTY      REDUCTION MAMMAPLASTY      TONSILLECTOMY        General Information       Row Name 07/24/23 1508          OT Time and Intention    Document Type therapy note (daily note)  -KB     Mode of Treatment occupational therapy;co-treatment  -KB       Row Name 07/24/23 8099          General Information    Patient Profile Reviewed yes  -KB     Existing Precautions/Restrictions no known precautions/restrictions  -KB       Row Name 07/24/23  1508          Cognition    Orientation Status (Cognition) oriented x 3  -KB       Row Name 07/24/23 1508          Safety Issues, Functional Mobility    Impairments Affecting Function (Mobility) balance;endurance/activity tolerance;pain;strength  -KB               User Key  (r) = Recorded By, (t) = Taken By, (c) = Cosigned By      Initials Name Provider Type    Aleyda Bourgeois OT Occupational Therapist                   Lymphedema       Row Name 07/24/23 1423             Subjective Pain    Subjective Pain Comment reports very tender to touch.   some grimace during edema assessment.   no grimace once began wrapping.  -LE      Recorded by [LE] Gwendolyn Cheney OTR              Subjective Comments    Subjective Comments pt agreeable to intiating lymph bandaging.  -LE      Recorded by [LE] Gwendolyn Cheney OTR              Vital Signs    O2 Delivery Pre Treatment room air  -LE      Pre Patient Position Supine  -LE      Intra Patient Position Side Lying  -LE      Post Patient Position Supine  -LE      Recorded by [LE] Gwendolyn Cheney OTR              Lymphedema Assessment    Lymphedema Classification RLE:;LLE:  -LE      Lymphedema Assessment Comments ordering MD present during lymph wrapping.  -LE      Recorded by [LE] Gwendolyn Cheney OTR              Lymphedema Edema Assessment    Pitting Edema Mild  -LE      Edema Assessment Comment pt reports B LE edema for years with worsening past months.  has been wearing zip up compression stockings she ordered from [email or web address].  -LE      Recorded by [LE] Gwendolyn Cheney OTR              Skin Changes/Observations    Skin Observations Comment B LE skin intact and dry.  has bandage over L sesamoid as pt being treated for wound. per RN note clean, dry and intact.   OT started bandaging just below bandaging at mid forefoot to allow access for nsg to monitor/bandage wound.  -LE      Recorded by [LE] Gwendolyn Cheney OTR              Compression/Skin Care    Compression/Skin Care skin care;wrapping  "location;bandaging;compression garment  -LE      Skin Care washed/dried;moisturizing lotion applied  -LE      Wrapping Location lower extremity  -LE      Wrapping Location LE bilateral:;foot to knee  -LE      Bandage Layers padding/fluff layer;cotton liner  -LE      Bandaging Comments each leg wrapped with TG9 stockinette, (1) Artiflex 10cm and (1/2) Artiflex 15 cm, (1) 8cm comprilan and (2) 10 cm comprilan.  -LE      Bandaging Technique light compression  -LE      Compression Garment Comments pt reports wrapping feels \"good\".  ed RN and pt on precuations and to remove if intense itching or circulation concerns.  Advise RN and Pt to save all materials if have to remove anything.  printed wrapping instructions left with pt as plan to d/c to rehab.  -LE      Recorded by [GRACE] Gewndolyn Cheney OTR                User Key  (r) = Recorded By, (t) = Taken By, (c) = Cosigned By      Initials Name Effective Dates    LE Gwendolyn Cheney OTR 06/16/21 -                    Mobility/ADL's       Row Name 07/24/23 1509          Bed Mobility    Bed Mobility supine-sit  -KB     Supine-Sit [place] (Bed Mobility) standby assist;verbal cues  -       Row Name 07/24/23 1509          Transfers    Transfers sit-stand transfer;stand-sit transfer  -       Row Name 07/24/23 1509          Bed-Chair Transfer    Bed-Chair [place] (Transfers) minimum assist (75% patient effort);1 person assist  -     Assistive Device (Bed-Chair Transfers) walker, front-wheeled  -       Row Name 07/24/23 1509          Sit-Stand Transfer    Sit-Stand [place] (Transfers) verbal cues;moderate assist (50% patient effort);2 person assist;nonverbal cues (demo/gesture)  -     Assistive Device (Sit-Stand Transfers) walker, front-wheeled  -       Row Name 07/24/23 1509          Functional Mobility    Functional Mobility- Ind. Level minimum assist (75% patient effort);1 person  -     Functional Mobility- Device walker, front-wheeled  -       Row Name " 07/24/23 1509          Activities of Daily Living    BADL Assessment/Intervention lower body dressing  -       Row Name 07/24/23 1509          Mobility    Left Lower Extremity (Weight-bearing Status) weight-bearing as tolerated (WBAT)  -       Row Name 07/24/23 1509          Lower Body Dressing Assessment/Training    [place] Level (Lower Body Dressing) don;socks;dependent (less than 25% patient effort)  -               User Key  (r) = Recorded By, (t) = Taken By, (c) = Cosigned By      Initials Name Provider Type    Aleyda Bourgeois OT Occupational Therapist                   Obj/Interventions       Row Name 07/24/23 1511          Balance    Balance Assessment sitting static balance;sitting dynamic balance;standing static balance;standing dynamic balance  -TYLER     Static Sitting Balance standby assist  -TYLER     Dynamic Sitting Balance standby assist  -     Static Standing Balance contact guard  -     Dynamic Standing Balance minimal assist  -               User Key  (r) = Recorded By, (t) = Taken By, (c) = Cosigned By      Initials Name Provider Type    Aleyda Bourgeois OT Occupational Therapist                   Goals/Plan    No documentation.                  Clinical Impression       Row Name 07/24/23 1511          Plan of Care Review    Plan of Care Reviewed With patient  -     Progress improving  -     Outcome Evaluation Pt seen for OT treatment this date. Pt was agreeable to participate. Required total assist to don socks this date. Performed bed mobiltiy with min assist. Required mod assist x2 to stand, cues to get balance. Performed fm with rw with min assist x2. Improved from last OT session but cont to demo weakness, decreased adl function and mobilty, will cont to benefit from acute OT. Recommending snf at time of dc.  -       Row Name 07/24/23 1511          Therapy Plan Review/Discharge Plan (OT)    Anticipated Discharge Disposition (OT) skilled nursing facility  -       [place]  Name 07/24/23 1511          Positioning and Restraints    Pre-Treatment Position in bed  -KB     Post Treatment Position chair  -KB     In Chair reclined;sitting;call light within reach;encouraged to call for assist;exit alarm on  -KB               User Key  (r) = Recorded By, (t) = Taken By, (c) = Cosigned By      Initials Name Provider Type    Aleyda Bourgeois OT Occupational Therapist                   Outcome Measures       Row Name 07/24/23 1514          How much help from another is currently needed...    Putting on and taking off regular lower body clothing? 2  -KB     Bathing (including washing, rinsing, and drying) 2  -KB     Toileting (which includes using toilet bed pan or urinal) 2  -KB     Putting on and taking off regular upper body clothing 3  -KB     Taking care of personal grooming (such as brushing teeth) 4  -KB     Eating meals 4  -KB     AM-PAC 6 Clicks Score (OT) 17  -KB       Row Name 07/24/23 1444          How much help from another person do you currently need...    Turning from your back to your side while in flat bed without using bedrails? 3  -DB     Moving from lying on back to sitting on the side of a flat bed without bedrails? 3  -DB     Moving to and from a bed to a chair (including a wheelchair)? 2  -DB     Standing up from a chair using your arms (e.g., wheelchair, bedside chair)? 2  -DB     Climbing 3-5 steps with a railing? 2  -DB     To walk in hospital room? 3  -DB     AM-PAC 6 Clicks Score (PT) 15  -DB     Highest level of mobility 4 --> Transferred to chair/commode  -DB       Row Name 07/24/23 1444          Functional Assessment    Outcome Measure Options AM-PAC 6 Clicks Basic Mobility (PT)  -DB               User Key  (r) = Recorded By, (t) = Taken By, (c) = Cosigned By      Initials Name Provider Type    DB Dannielle Harrison, PT Physical Therapist    Aleyda Bourgeois OT Occupational Therapist                    Occupational Therapy Education       Title: PT OT SLP  Therapies (In Progress)       Topic: Occupational Therapy (In Progress)       Point: ADL training (Done)       Description:   Instruct learner(s) on proper safety adaptation and remediation techniques during self care or transfers.   Instruct in proper use of assistive devices.                  Learning Progress Summary             Patient Acceptance, E, VU by  at 7/22/2023 1347    Comment: role of OT, d/c rec                         Point: Home exercise program (Not Started)       Description:   Instruct learner(s) on appropriate technique for monitoring, assisting and/or progressing therapeutic exercises/activities.                  Learner Progress:  Not documented in this visit.              Point: Precautions (Done)       Description:   Instruct learner(s) on prescribed precautions during self-care and functional transfers.                  Learning Progress Summary             Patient Acceptance, E, VU by  at 7/22/2023 1347    Comment: role of OT, d/c rec                         Point: Body mechanics (Done)       Description:   Instruct learner(s) on proper positioning and spine alignment during self-care, functional mobility activities and/or exercises.                  Learning Progress Summary             Patient Acceptance, E, VU by  at 7/22/2023 1347    Comment: role of OT, d/c rec                                         User Key       Initials Effective Dates Name Provider Type Discipline     08/20/21 -  Arcelia Valentin OT Occupational Therapist OT                  OT Recommendation and Plan     Plan of Care Review  Plan of Care Reviewed With: patient  Progress: improving  Outcome Evaluation: Pt seen for OT treatment this date. Pt was agreeable to participate. Required total assist to don socks this date. Performed bed mobiltiy with min assist. Required mod assist x2 to stand, cues to get balance. Performed fm with rw with min assist x2. Improved from last OT session but cont to demo weakness,  decreased adl function and mobilty, will cont to benefit from acute OT. Recommending snf at time of dc.     Time Calculation:         Time Calculation- OT       Row Name 07/24/23 1515 07/24/23 1439 07/24/23 1437       Time Calculation- OT    OT Start Time 1413  -KB 1340  -LE 1058  -LE    OT Stop Time 1436  -KB 1415  -LE 1106  first time seeing pt to assess edema, provide education and make plan to return with supplies to wrap.  -LE    OT Time Calculation (min) 23 min  -KB 35 min  -LE 8 min  -LE    Total Timed Code Minutes- OT 23 minute(s)  -KB 35 minute(s)  -LE 8 minute(s)  -LE    OT Received On 07/24/23  -KB 07/24/23  -LE --    OT - Next Appointment 07/25/23  -KB 07/25/23  -LE --    OT Goal Re-Cert Due Date 08/05/23  -KB -- --       Timed Charges    85976 - OT Therapeutic Activity Minutes 23  -KB -- --       Total Minutes    Timed Charges Total Minutes 23  -KB -- --     Total Minutes 23  -KB -- --              User Key  (r) = Recorded By, (t) = Taken By, (c) = Cosigned By      Initials Name Provider Type    Gwendolyn Multani OTR Occupational Therapist    Aleyda Bourgeois OT Occupational Therapist                  Therapy Charges for Today       Code Description Service Date Service Provider Modifiers Qty    [id number]  OT THERAPEUTIC ACT EA 15 MIN 7/24/2023 Aleyda Toledo OT GO 2                 Aleyda Toledo OT  7/24/2023

## 2023-07-24 NOTE — PLAN OF CARE
Goal Outcome Evaluation:  Plan of Care Reviewed With: patient        Progress: improving  Outcome Evaluation: Pt seen today for PT/OT co-treat to maximize therapeutic benefit. Pt was SBA for bed mobility with inc'd time to complete. Once sitting EOB, she was given VC's to perform STS to RW, req's modA x2 and pt c/o RLE pain upon standing. Pt was able to ambulate over to chair, encouraged to ambulate further with chair follow provided. Very fwd flexed posture, cues throughout to correct. Pt ambulates 25' with RW, very slow gait speed and antalgic pattern. Seated UIC at end of session to review LE ther ex.

## 2023-07-24 NOTE — PLAN OF CARE
Goal Outcome Evaluation:  Plan of Care Reviewed With: patient           Outcome Evaluation: VSS, heart rate hermelindo at times,  pain controlled with Norco, redness and pain noted on BLE, excoriation noted on folds in skin, under breast and coccyx/ marquez area. left great toe has wound- dressing CDI. pure wick in place, pt states she can't walk, Accumax on bed, monitoring blood sugars and giving insulin as ordered, CT of chest ordered and yet to be completed, pt shifting weight in bed, will continue to monitor.

## 2023-07-24 NOTE — THERAPY TREATMENT NOTE
Patient Name: Halie Guidry  : 1940    MRN: [id number]                              Today's Date: 2023       Admit Date: 2023    Visit Dx:     ICD-10-CM ICD-9-CM   1. Bilateral leg pain  M79.604 729.5    M79.605    2. Unable to ambulate  R26.2 719.7   3. Peripheral edema  R60.9 782.3     Patient Active Problem List   Diagnosis    Compression fracture    Lumbar degenerative disc disease    Type 2 diabetes mellitus with hyperglycemia, with long-term current use of insulin    Hyperlipidemia    Benign essential hypertension    Primary hypothyroidism    Leukocytosis    Neuropathy    Osteoporosis    Proteinuria    Tobacco abuse    Diabetic eye exam    Generalized weakness    Spinal stenosis    Scoliosis    Arthritis    VBI (vertebrobasilar insufficiency)    Orthostatic hypotension    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    Severe hypothyroidism    Noncompliance with medication regimen    Vitamin D deficiency disease    Altered mental status    Tremor    Seizure    Stage 3a chronic kidney disease    Thoracic degenerative disc disease    Metabolic encephalopathy    VIPUL (acute kidney injury)    Hyperglycemia    Type II diabetes mellitus, uncontrolled    Lower abdominal pain    Transaminitis    COVID-19 virus detected    UTI (urinary tract infection)    Emphysematous cystitis    Choledocholithiasis    Hypokalemia    Hypoxia    Generalized abdominal pain    History of Clostridium difficile infection    History of ERCP    Acute UTI (urinary tract infection)    Hypomagnesemia    Burning pain    Anxiety disorder    Neuropathic pain    Intertrigo    Weakness of both lower extremities    Accelerated hypertension    Acute cystitis without hematuria    Altered mental status, unspecified altered mental status type    Left lower lobe pneumonia    Acute pain of left foot    Cellulitis and abscess of left lower extremity    Hypertensive kidney disease with stage 3a chronic kidney disease     Bilateral leg pain     Past Medical History:   Diagnosis Date    Acute metabolic encephalopathy 6/24/2022    Anxiety     Arthritis     Benign essential hypertension 08/20/2014    Bleeding disorder     Depression     Diabetes     Diabetes mellitus, type 2     Disc degeneration, lumbar     Headache, tension-type     Hyperlipidemia     Hypothyroidism     Neuropathy     Osteoporosis 09/09/2015    Peripheral neuropathy     Rotator cuff tear, left     Scoliosis     Shoulder pain     LEFT, TORN ROTATOR CUFF S/P FALL    Spinal stenosis      Past Surgical History:   Procedure Laterality Date    APPENDECTOMY      BILATERAL BREAST REDUCTION Bilateral 08/2015    CATARACT EXTRACTION  03/2015    CHOLECYSTECTOMY WITH INTRAOPERATIVE CHOLANGIOGRAM N/A 3/27/2022    Procedure: CHOLECYSTECTOMY LAPAROSCOPIC INTRAOPERATIVE CHOLANGIOGRAM;  Surgeon: Aiyana Hill MD;  Location: [facility] MAIN OR;  Service: General;  Laterality: N/A;    COLONOSCOPY  06/05/2015    WNL    ERCP N/A 2/25/2022    Procedure: ENDOSCOPIC RETROGRADE CHOLANGIOPANCREATOGRAPHY with sphincterotomy and balloon sweep;  Surgeon: Chilo Wilhelm MD;  Location: [facility] ENDOSCOPY;  Service: Gastroenterology;  Laterality: N/A;  PRE/POST - CBD stones    ERCP N/A 3/28/2022    Procedure: ENDOSCOPIC RETROGRADE CHOLANGIOPANCREATOGRAPHY WITH SPHINCTEROTOMY AND BALLOON SWEEP;  Surgeon: Chilo Wilhelm MD;  Location: [facility] ENDOSCOPY;  Service: Gastroenterology;  Laterality: N/A;  PRE: COMMON DUCT STONE  POST: COMMON DUCT STONE    KYPHOPLASTY      REDUCTION MAMMAPLASTY      TONSILLECTOMY        General Information       Row Name 07/24/23 1440          Physical Therapy Time and Intention    Document Type therapy note (daily note)  -DB     Mode of Treatment co-treatment;physical therapy;occupational therapy  -DB       Row Name 07/24/23 1440          General Information    Patient Profile Reviewed yes  -DB               User Key  (r) = Recorded By, (t) = Taken By, (c) = Cosigned  By      Initials Name Provider Type    Dannielle Jones, MELODIE Physical Therapist                   Mobility       Row Name 07/24/23 1440          Bed Mobility    Bed Mobility supine-sit  -DB     Supine-Sit Morgan (Bed Mobility) standby assist;verbal cues  -DB     Assistive Device (Bed Mobility) bed rails;head of bed elevated  -DB     Comment, (Bed Mobility) inc'd time to complete  -DB       Row Name 07/24/23 1440          Sit-Stand Transfer    Sit-Stand Morgan (Transfers) verbal cues;moderate assist (50% patient effort);2 person assist;nonverbal cues (demo/gesture)  -DB     Assistive Device (Sit-Stand Transfers) walker, front-wheeled  -DB       Row Name 07/24/23 1440          Gait/Stairs (Locomotion)    Morgan Level (Gait) minimum assist (75% patient effort);1 person assist;2 person assist;1 person to manage equipment  -DB     Assistive Device (Gait) walker, front-wheeled  -DB     Distance in Feet (Gait) 20' with chair follow  -DB     Deviations/Abnormal Patterns (Gait) antalgic;base of support, wide;weight shifting decreased;stride length decreased;gait speed decreased  -DB     Bilateral Gait Deviations forward flexed posture;heel strike decreased  -DB               User Key  (r) = Recorded By, (t) = Taken By, (c) = Cosigned By      Initials Name Provider Type    Dannielle Jones PT Physical Therapist                   Obj/Interventions       Row Name 07/24/23 1441          Balance    Balance Assessment sitting static balance;sitting dynamic balance;standing static balance;standing dynamic balance  -DB     Static Sitting Balance standby assist  -DB     Dynamic Sitting Balance standby assist  -DB     Position, Sitting Balance sitting edge of bed  -DB     Static Standing Balance contact guard  -DB     Dynamic Standing Balance minimal assist  -DB     Position/Device Used, Standing Balance supported;walker, front-wheeled  -DB     Balance Interventions sitting;standing;sit to stand  -DB                User Key  (r) = Recorded By, (t) = Taken By, (c) = Cosigned By      Initials Name Provider Type    Dannielle Jones PT Physical Therapist                   Goals/Plan    No documentation.                  Clinical Impression       Row Name 07/24/23 1441          Pain    Pain Intervention(s) Repositioned;Ambulation/increased activity  -DB       Row Name 07/24/23 1441          Plan of Care Review    Plan of Care Reviewed With patient  -DB     Progress improving  -DB     Outcome Evaluation Pt seen today for PT/OT co-treat to maximize therapeutic benefit. Pt was SBA for bed mobility with inc'd time to complete. Once sitting EOB, she was given VC's to perform STS to RW, req's modA x2 and pt c/o RLE pain upon standing. Pt was able to ambulate over to chair, encouraged to ambulate further with chair follow provided. Very fwd flexed posture, cues throughout to correct. Pt ambulates 25' with RW, very slow gait speed and antalgic pattern. Seated UIC at end of session to review LE ther ex.  -DB       Row Name 07/24/23 1441          Vital Signs    O2 Delivery Pre Treatment room air  -DB     O2 Delivery Intra Treatment room air  -DB     O2 Delivery Post Treatment room air  -DB     Pre Patient Position Supine  -DB     Intra Patient Position Standing  -DB     Post Patient Position Sitting  -DB       Row Name 07/24/23 1441          Positioning and Restraints    Pre-Treatment Position in bed  -DB     Post Treatment Position chair  -DB     In Chair sitting;call light within reach;encouraged to call for assist  -DB               User Key  (r) = Recorded By, (t) = Taken By, (c) = Cosigned By      Initials Name Provider Type    Dannielle Jones PT Physical Therapist                   Outcome Measures       Row Name 07/24/23 1444          How much help from another person do you currently need...    Turning from your back to your side while in flat bed without using bedrails? 3  -DB     Moving from lying on back to sitting  on the side of a flat bed without bedrails? 3  -DB     Moving to and from a bed to a chair (including a wheelchair)? 2  -DB     Standing up from a chair using your arms (e.g., wheelchair, bedside chair)? 2  -DB     Climbing 3-5 steps with a railing? 2  -DB     To walk in hospital room? 3  -DB     AM-PAC 6 Clicks Score (PT) 15  -DB     Highest level of mobility 4 --> Transferred to chair/commode  -DB       Row Name 07/24/23 1444          Functional Assessment    Outcome Measure Options AM-PAC 6 Clicks Basic Mobility (PT)  -DB               User Key  (r) = Recorded By, (t) = Taken By, (c) = Cosigned By      Initials Name Provider Type    DB Dannielle Harrison PT Physical Therapist                                 Physical Therapy Education       Title: PT OT SLP Therapies (In Progress)       Topic: Physical Therapy (Done)       Point: Mobility training (Done)       Learning Progress Summary             Patient Acceptance, E, VU,NR by DB at 7/24/2023 1445                         Point: Home exercise program (Done)       Learning Progress Summary             Patient Acceptance, E, VU,NR by DB at 7/24/2023 1445                         Point: Body mechanics (Done)       Learning Progress Summary             Patient Acceptance, E, VU,NR by DB at 7/24/2023 1445                         Point: Precautions (Done)       Learning Progress Summary             Patient Acceptance, E, VU,NR by DB at 7/24/2023 1445                                         User Key       Initials Effective Dates Name Provider Type Discipline    DB 06/16/21 -  Dannielle Harrison PT Physical Therapist PT                  PT Recommendation and Plan     Plan of Care Reviewed With: patient  Progress: improving  Outcome Evaluation: Pt seen today for PT/OT co-treat to maximize therapeutic benefit. Pt was SBA for bed mobility with inc'd time to complete. Once sitting EOB, she was given VC's to perform STS to RW, req's modA x2 and pt c/o RLE pain upon standing. Pt  was able to ambulate over to chair, encouraged to ambulate further with chair follow provided. Very fwd flexed posture, cues throughout to correct. Pt ambulates 25' with RW, very slow gait speed and antalgic pattern. Seated UIC at end of session to review LE ther ex.     Time Calculation:         PT Charges       Row Name 07/24/23 1445             Time Calculation    Start Time 1414  -DB      Stop Time 1431  -DB      Time Calculation (min) 17 min  -DB      PT Received On 07/24/23  -DB      PT - Next Appointment 07/25/23  -DB         Time Calculation- PT    Total Timed Code Minutes- PT 17 minute(s)  -DB                User Key  (r) = Recorded By, (t) = Taken By, (c) = Cosigned By      Initials Name Provider Type    DB Dannielle Harrison PT Physical Therapist                  Therapy Charges for Today       Code Description Service Date Service Provider Modifiers Qty    [id number]  PT THERAPEUTIC ACT EA 15 MIN 7/24/2023 Dannielle Harrison PT GP 1            PT G-Codes  Outcome Measure Options: AM-PAC 6 Clicks Basic Mobility (PT)  AM-PAC 6 Clicks Score (PT): 15  AM-PAC 6 Clicks Score (OT): 16       Dannielle Harrison PT  7/24/2023

## 2023-07-24 NOTE — PLAN OF CARE
Goal Outcome Evaluation:  Plan of Care Reviewed With: patient        Progress: improving  Outcome Evaluation: Pt seen for OT treatment this date. Pt was agreeable to participate. Required total assist to don socks this date. Performed bed mobiltiy with min assist. Required mod assist x2 to stand, cues to get balance. Performed fm with rw with min assist x2. Improved from last OT session but cont to demo weakness, decreased adl function and mobilty, will cont to benefit from acute OT. Recommending snf at time of dc.  OT wore appropriate PPE during session and performed hand hygiene before/after session.       Anticipated Discharge Disposition (OT): skilled nursing facility

## 2023-07-24 NOTE — PROGRESS NOTES
Name: Halie Guidry ADMIT: 2023   : 1940  PCP: Napoleon Mead MD    MRN: [id number] LOS: 0 days   AGE/SEX: 83 y.o. female  ROOM: [facility]     Subjective   Subjective   No new complaints. Has been worried about CT scan result     Objective   Objective   Vital Signs  Temp:  [96.8 øF (36 øC)-97.4 øF (36.3 øC)] 97.3 øF (36.3 øC)  Heart Rate:  [52-55] 54  Resp:  [16] 16  BP: (124-155)/(59-74) 155/59  SpO2:  [92 %-98 %] 98 %  on   ;   Device (Oxygen Therapy): room air  Body mass index is 30.07 kg/mý.    Physical Exam  Constitutional:       General: She is not in acute distress.     Appearance: She is not toxic-appearing.   HENT:      Head: Normocephalic and atraumatic.   Eyes:      Extraocular Movements: Extraocular movements intact.   Cardiovascular:      Rate and Rhythm: Normal rate and regular rhythm.   Pulmonary:      Effort: Pulmonary effort is normal. No respiratory distress.      Breath sounds: Normal breath sounds. No wheezing or rhonchi.   Abdominal:      General: Bowel sounds are normal.      Palpations: Abdomen is soft.      Tenderness: There is no abdominal tenderness. There is no guarding or rebound.   Musculoskeletal:         General: Swelling (Venous insufficiency) present.      Cervical back: Normal range of motion.   Skin:     General: Skin is warm and dry.   Neurological:      General: No focal deficit present.      Mental Status: She is alert and oriented to person, place, and time.   Psychiatric:         Mood and Affect: Mood normal.         Behavior: Behavior normal.         Thought Content: Thought content normal.     Results Review  I reviewed the patient's new clinical results.  Results from last 7 days   Lab Units 23  0512 23  0404 23  1846   WBC 10*3/mm3 8.46 8.77 8.42 9.02   HEMOGLOBIN g/dL 11.5* 11.1* 11.4* 11.4*   PLATELETS 10*3/mm3 276 291 286 308       Results from last 7 days   Lab Units 23  0512 23  0404 23  07/21/23  1846   SODIUM mmol/L 134* 137 136 132*   POTASSIUM mmol/L 3.9 4.2 3.6 4.3   CHLORIDE mmol/L 101 101 101 100   CO2 mmol/L 20.9* 23.0 25.0 20.6*   BUN mg/dL 38* 36* 27* 29*   CREATININE mg/dL 1.21* 1.30* 1.13* 1.13*   GLUCOSE mg/dL 206* 282* 278* 336*       Lab Results   Component Value Date    ANIONGAP 12.1 07/24/2023     Estimated Creatinine Clearance: 41.3 mL/min (A) (by C-G formula based on SCr of 1.21 mg/dL (H)).    Results from last 7 days   Lab Units 07/21/23  1846   ALBUMIN g/dL 3.9   BILIRUBIN mg/dL 0.4   ALK PHOS U/L 109   AST (SGOT) U/L 18   ALT (SGPT) U/L 10       Results from last 7 days   Lab Units 07/24/23  0512 07/23/23  0404 07/22/23  0412 07/21/23  1846   CALCIUM mg/dL 9.1 9.1 9.0 8.7   ALBUMIN g/dL  --   --   --  3.9         Glucose   Date/Time Value Ref Range Status   07/24/2023 1155 365 (H) 70 - 130 mg/dL Final     Comment:     Meter: [id number] : [id number] Anna Farmer NA   07/24/2023 1111 345 (H) 70 - 130 mg/dL Final     Comment:     Meter: [id number] : [id number] Anna Farmer NA   07/24/2023 0532 211 (H) 70 - 130 mg/dL Final     Comment:     Meter: [id number] : [id number] Perry Hernandez NA   07/23/2023 2121 201 (H) 70 - 130 mg/dL Final     Comment:     Meter: [id number] : [id number] Perry Hernandez NA   07/23/2023 1601 100 70 - 130 mg/dL Final     Comment:     Meter: [id number] : [id number] Danis HERNANDEZ   07/23/2023 1107 261 (H) 70 - 130 mg/dL Final     Comment:     Meter: [id number] : [id number] Dnais Smithh MARY   07/23/2023 0739 296 (H) 70 - 130 mg/dL Final     Comment:     Meter: [id number] : [id number] Danis HERNANDEZ       CT Chest With Contrast Diagnostic    Result Date: 7/24/2023  1. No suspicious right hilar or pulmonary mass. Mild right basilar atelectasis. 2. Numerous renal cortical lesions partly imaged. There is at least 1 intermediate density lesion. At this time, follow-up with pre and postcontrast CT or MR renal mass protocol is  recommended for characterization on a nonemergent basis. 3. Small sliding hiatal hernia.  Radiation dose reduction techniques were utilized, including automated exposure control and exposure modulation based on body size.        Scheduled Meds  amLODIPine, 5 mg, Oral, Daily  atorvastatin, 80 mg, Oral, Nightly  bisoprolol, 5 mg, Oral, Daily  chlorthalidone, 25 mg, Oral, Daily  DULoxetine, 30 mg, Oral, Daily  insulin glargine, 70 Units, Subcutaneous, Daily  insulin lispro, 12 Units, Subcutaneous, TID With Meals  insulin lispro, 3-14 Units, Subcutaneous, 4x Daily With Meals & Nightly  levothyroxine, 112 mcg, Oral, Once per day on Mon Tue Wed Thu Fri Sat  levothyroxine, 168 mcg, Oral, Once per day on Sun  lisinopril, 40 mg, Oral, Daily  pantoprazole, 40 mg, Oral, Q AM  pregabalin, 50 mg, Oral, TID  senna-docusate sodium, 2 tablet, Oral, BID  sodium chloride, 10 mL, Intravenous, Q12H  terazosin, 1 mg, Oral, Nightly  cyanocobalamin, 1,000 mcg, Oral, Daily    Continuous Infusions   PRN Meds    senna-docusate sodium **AND** polyethylene glycol **AND** bisacodyl **AND** bisacodyl    dextrose    dextrose    glucagon (human recombinant)    HYDROcodone-acetaminophen    sodium chloride    sodium chloride     Diet  Diet: Cardiac Diets, Diabetic Diets; Healthy Heart (2-3 Na+); Consistent Carbohydrate; Texture: Regular Texture (IDDSI 7); Fluid Consistency: Thin (IDDSI 0)    I have personally reviewed:  [x]  Medications  [x]  Laboratory   []  Microbiology   [x]  Radiology  []  EKG/Telemetry   []  Cardiology/Vascular   []  Pathology   []  Records      Assessment/Plan     Active Hospital Problems    Diagnosis  POA    **Bilateral leg pain [M79.604, M79.605]  Yes    Hypertensive kidney disease with stage 3a chronic kidney disease [I12.9, N18.31]  Yes    Anxiety disorder [F41.9]  Yes    Noncompliance with medication regimen [Z91.148]  Not Applicable    Hyperlipidemia [E78.5]  Yes    Primary hypothyroidism [E03.9]  Yes     Type 2 diabetes mellitus with hyperglycemia, with long-term current use of insulin [E11.65, Z79.4]  Not Applicable    Benign essential hypertension [I10]  Yes      Resolved Hospital Problems   No resolved problems to display.     83 y.o. female with history of diabetes, neuropathy, diabetic foot ulcer, hypertension, hyperlipidemia, hypothyroidism, cognitive impairment, recurrent UTIs presented with bilateral lower extremity swelling and pain since May and decreased activity due to pain. She was in the hospital in May for lower extremity cellulitis declined SNF recommendation.    Leg pain  Venous insufficiency  -Outpatient Doppler negative for DVT  -Check x-ray right lower leg    Abnormal chest x-ray  -Radiology recommends CT with contrast   -CT scan no suspicious right hilar pulmonary mass. Mild right basilar atelectasis. Discussed with patient result    Bilateral renal lesions seen on imaging  -Pre and postcontrast CT or MRI renal mass protocol recommended  -Discussed with patient will wait a day due to contrast and CKD    DM2 with neuropathy  -Has been noncompliant as an outpatient  -A1c 11.80% last month  -Adjust long-acting and mealtime insulin again.   -Continue correctional insulin     Hypertension  -Continue medications  -Not at goal but acceptable monitor for now may adjust    Hyperlipidemia  -Statin    Hypothyroidism  -Levothyroxine    CKD 3A  -Continue to monitor (received contrast today)    SCDs for DVT prophylaxis    Discussed with patient and nursing staff    Discharge: SNF pending pre-CERT    Efe Moses MD  [facility]  07/24/23

## 2023-07-24 NOTE — PROGRESS NOTES
Continued Stay Note  [facility]     Patient Name: Halie Guidry  MRN: [id number]  Today's Date: 7/24/2023    Admit Date: 7/21/2023    Plan: SNF (Referral in [facility]/Pending)   Discharge Plan       Row Name 07/24/23 0954       Plan    Plan SNF (Referral in EPIC/Pending)    Plan Comments Msg sent to [facility] with [facility] regaring referral.                   Discharge Codes    No documentation.                       Vannessa Mcdowell RN

## 2023-07-24 NOTE — CASE MANAGEMENT/SOCIAL WORK
Continued Stay Note  [facility]     Patient Name: Halie Guidry  MRN: [id number]  Today's Date: 7/24/2023    Admit Date: 7/21/2023    Plan: [facility] precert approved   Discharge Plan       Row Name 07/24/23 1517       Plan    Plan [facility] precert approved    Plan Comments Precert submitted and approved for [place] for admission 7/24/2023.  CCP and liaison notified.  Approval faxed to [facility]  Kamilah SORIA      Row Name            Plan     Plan Comments                    Discharge Codes    No documentation.                 Expected Discharge Date and Time       Expected Discharge Date Expected Discharge Time    Jul 25, 2023               Kamilah Bennett RN

## 2023-07-24 NOTE — PLAN OF CARE
Goal Outcome Evaluation:  Plan of Care Reviewed With: patient (ed pt OT will follow up tomorrow for wrapping.)  Lymphedema consult received.  Pt is current with skilled OT this admit.  Pt with h/o  B LE edema, has been using compression stockings at home.   MD present during wrapping.  Pt is agreeable to initiating B LE lymphedema wrapping and OT provide education on precautions, writtent instructions on wrapping and will follow up while in acute care for wrapping.

## 2023-07-24 NOTE — PROGRESS NOTES
Continued Stay Note  [facility]     Patient Name: Halie Guidry  MRN: [id number]  Today's Date: 7/24/2023    Admit Date: 7/21/2023    Plan: [facility] (PENDING Pre-cert)   Discharge Plan       Row Name 07/24/23 1236       Plan    Plan [facility] (PENDING Pre-cert)    Plan Comments Per Mame with [facility], no beds available at [facility]. Does have a bed at [place]. Spoke to patient who is in agreement to [facility]. MD stated ready for DC. Msg sent to [facility] Authorizations [email or web address] to begin Pre-cert. [facility] Informed Mame with [facility].      Row Name 07/24/23 0954       Plan    Plan SNF (Referral in EPIC/Pending)    Plan Comments Msg sent to Mame with [facility] regaring referral.                   Discharge Codes    No documentation.                 Expected Discharge Date and Time       Expected Discharge Date Expected Discharge Time    Jul 25, 2023               Vannessa Mcdowell, RN

## 2023-07-25 LAB
ANION GAP SERPL CALCULATED.3IONS-SCNC: 13 MMOL/L (ref 5–15)
BUN SERPL-MCNC: 36 MG/DL (ref 8–23)
BUN/CREAT SERPL: 31.9 (ref 7–25)
CALCIUM SPEC-SCNC: 9.2 MG/DL (ref 8.6–10.5)
CHLORIDE SERPL-SCNC: 103 MMOL/L (ref 98–107)
CO2 SERPL-SCNC: 20 MMOL/L (ref 22–29)
CREAT SERPL-MCNC: 1.13 MG/DL (ref 0.57–1)
DEPRECATED RDW RBC AUTO: 43 FL (ref 37–54)
EGFRCR SERPLBLD CKD-EPI 2021: 48.4 ML/MIN/1.73
ERYTHROCYTE [DISTWIDTH] IN BLOOD BY AUTOMATED COUNT: 14.1 % (ref 12.3–15.4)
GLUCOSE BLDC GLUCOMTR-MCNC: 114 MG/DL (ref 70–130)
GLUCOSE BLDC GLUCOMTR-MCNC: 116 MG/DL (ref 70–130)
GLUCOSE BLDC GLUCOMTR-MCNC: 186 MG/DL (ref 70–130)
GLUCOSE BLDC GLUCOMTR-MCNC: 217 MG/DL (ref 70–130)
GLUCOSE BLDC GLUCOMTR-MCNC: 75 MG/DL (ref 70–130)
GLUCOSE SERPL-MCNC: 112 MG/DL (ref 65–99)
HCT VFR BLD AUTO: 36.2 % (ref 34–46.6)
HGB BLD-MCNC: 11.8 G/DL (ref 12–15.9)
MCH RBC QN AUTO: 27.4 PG (ref 26.6–33)
MCHC RBC AUTO-ENTMCNC: 32.6 G/DL (ref 31.5–35.7)
MCV RBC AUTO: 84 FL (ref 79–97)
PLATELET # BLD AUTO: 284 10*3/MM3 (ref 140–450)
PMV BLD AUTO: 9.9 FL (ref 6–12)
POTASSIUM SERPL-SCNC: 4.1 MMOL/L (ref 3.5–5.2)
RBC # BLD AUTO: 4.31 10*6/MM3 (ref 3.77–5.28)
SODIUM SERPL-SCNC: 136 MMOL/L (ref 136–145)
WBC NRBC COR # BLD: 9.28 10*3/MM3 (ref 3.4–10.8)

## 2023-07-25 PROCEDURE — 63710000001 INSULIN GLARGINE PER 5 UNITS: Performed by: HOSPITALIST

## 2023-07-25 PROCEDURE — 63710000001 INSULIN LISPRO (HUMAN) PER 5 UNITS: Performed by: NURSE PRACTITIONER

## 2023-07-25 PROCEDURE — 85027 COMPLETE CBC AUTOMATED: CPT | Performed by: HOSPITALIST

## 2023-07-25 PROCEDURE — 63710000001 INSULIN LISPRO (HUMAN) PER 5 UNITS: Performed by: HOSPITALIST

## 2023-07-25 PROCEDURE — 29581 APPL MULTLAYER CMPRN SYS LEG: CPT | Performed by: OCCUPATIONAL THERAPIST

## 2023-07-25 PROCEDURE — 82948 REAGENT STRIP/BLOOD GLUCOSE: CPT

## 2023-07-25 PROCEDURE — 80048 BASIC METABOLIC PNL TOTAL CA: CPT | Performed by: HOSPITALIST

## 2023-07-25 PROCEDURE — G0378 HOSPITAL OBSERVATION PER HR: HCPCS

## 2023-07-25 RX ADMIN — INSULIN LISPRO 3 UNITS: 100 INJECTION, SOLUTION INTRAVENOUS; SUBCUTANEOUS at 09:28

## 2023-07-25 RX ADMIN — PREGABALIN 50 MG: 50 CAPSULE ORAL at 20:03

## 2023-07-25 RX ADMIN — INSULIN GLARGINE 80 UNITS: 100 INJECTION, SOLUTION SUBCUTANEOUS at 09:27

## 2023-07-25 RX ADMIN — HYDROCODONE BITARTRATE AND ACETAMINOPHEN 1 TABLET: 5; 325 TABLET ORAL at 17:39

## 2023-07-25 RX ADMIN — INSULIN LISPRO 18 UNITS: 100 INJECTION, SOLUTION INTRAVENOUS; SUBCUTANEOUS at 09:28

## 2023-07-25 RX ADMIN — LEVOTHYROXINE SODIUM 112 MCG: 0.11 TABLET ORAL at 05:07

## 2023-07-25 RX ADMIN — HYDROCODONE BITARTRATE AND ACETAMINOPHEN 1 TABLET: 5; 325 TABLET ORAL at 23:37

## 2023-07-25 RX ADMIN — INSULIN LISPRO 5 UNITS: 100 INJECTION, SOLUTION INTRAVENOUS; SUBCUTANEOUS at 11:49

## 2023-07-25 RX ADMIN — BISOPROLOL FUMARATE 5 MG: 5 TABLET, FILM COATED ORAL at 09:25

## 2023-07-25 RX ADMIN — Medication 1000 MCG: at 09:24

## 2023-07-25 RX ADMIN — ZINC OXIDE 1 APPLICATION: 200 OINTMENT TOPICAL at 09:27

## 2023-07-25 RX ADMIN — DULOXETINE HYDROCHLORIDE 30 MG: 30 CAPSULE, DELAYED RELEASE ORAL at 09:25

## 2023-07-25 RX ADMIN — SENNOSIDES AND DOCUSATE SODIUM 2 TABLET: 50; 8.6 TABLET ORAL at 09:25

## 2023-07-25 RX ADMIN — HYDROCODONE BITARTRATE AND ACETAMINOPHEN 1 TABLET: 5; 325 TABLET ORAL at 04:38

## 2023-07-25 RX ADMIN — PREGABALIN 50 MG: 50 CAPSULE ORAL at 17:38

## 2023-07-25 RX ADMIN — Medication 5 MG: at 20:03

## 2023-07-25 RX ADMIN — INSULIN LISPRO 18 UNITS: 100 INJECTION, SOLUTION INTRAVENOUS; SUBCUTANEOUS at 17:38

## 2023-07-25 RX ADMIN — Medication 10 ML: at 20:04

## 2023-07-25 RX ADMIN — HYDROCODONE BITARTRATE AND ACETAMINOPHEN 1 TABLET: 5; 325 TABLET ORAL at 12:10

## 2023-07-25 RX ADMIN — PREGABALIN 50 MG: 50 CAPSULE ORAL at 09:25

## 2023-07-25 RX ADMIN — PANTOPRAZOLE SODIUM 40 MG: 40 TABLET, DELAYED RELEASE ORAL at 05:06

## 2023-07-25 RX ADMIN — INSULIN LISPRO 18 UNITS: 100 INJECTION, SOLUTION INTRAVENOUS; SUBCUTANEOUS at 11:48

## 2023-07-25 RX ADMIN — ZINC OXIDE 1 APPLICATION: 200 OINTMENT TOPICAL at 20:04

## 2023-07-25 RX ADMIN — CHLORTHALIDONE 25 MG: 25 TABLET ORAL at 09:25

## 2023-07-25 RX ADMIN — Medication 10 ML: at 09:27

## 2023-07-25 RX ADMIN — TERAZOSIN 1 MG: 1 CAPSULE ORAL at 20:05

## 2023-07-25 RX ADMIN — LISINOPRIL 40 MG: 40 TABLET ORAL at 09:24

## 2023-07-25 RX ADMIN — AMLODIPINE BESYLATE 5 MG: 5 TABLET ORAL at 09:25

## 2023-07-25 RX ADMIN — ATORVASTATIN CALCIUM 80 MG: 20 TABLET, FILM COATED ORAL at 20:03

## 2023-07-25 NOTE — PLAN OF CARE
Goal Outcome Evaluation:  Plan of Care Reviewed With: patient        Progress: improving  Outcome Evaluation: pt LEs un wrapped, washed, and applied lotion, LE re wrapped. pt reports decr in swelling and they look a lot better per pt report. pt not leaving today due to needing a CT but CT is backed up w pts. possible dc tomorrow if CT completed and read. pt did not want to complete any regular therapy today .

## 2023-07-25 NOTE — SIGNIFICANT NOTE
07/25/23 1517   OTHER   Discipline physical therapist   Rehab Time/Intention   Session Not Performed other (see comments)  (Spoke with OT who just finished with lymphedema mangaement with patient. Patient too fatigued for participation in PT this PM. PT will f/u.)   Recommendation   PT - Next Appointment 07/26/23

## 2023-07-25 NOTE — PROGRESS NOTES
Name: Halie Guidry ADMIT: 2023   : 1940  PCP: Napoleon Mead MD    MRN: [id number] LOS: 0 days   AGE/SEX: 83 y.o. female  ROOM: [facility]     Subjective   Subjective   No new complaints. Denies chest pain shortness of breath     Objective   Objective   Vital Signs  Temp:  [96.4 øF (35.8 øC)-98 øF (36.7 øC)] 97 øF (36.1 øC)  Heart Rate:  [56-59] 57  Resp:  [16-18] 18  BP: (140-164)/(61-77) 164/70  SpO2:  [94 %-98 %] 98 %  on   ;   Device (Oxygen Therapy): room air  Body mass index is 29.23 kg/mý.    Physical Exam  Constitutional:       General: She is not in acute distress.     Appearance: She is not toxic-appearing.   HENT:      Head: Normocephalic and atraumatic.   Eyes:      Extraocular Movements: Extraocular movements intact.   Cardiovascular:      Rate and Rhythm: Normal rate and regular rhythm.   Pulmonary:      Effort: Pulmonary effort is normal. No respiratory distress.      Breath sounds: Normal breath sounds. No wheezing or rhonchi.   Abdominal:      General: Bowel sounds are normal.      Palpations: Abdomen is soft.      Tenderness: There is no abdominal tenderness. There is no guarding or rebound.   Musculoskeletal:      Cervical back: Normal range of motion.      Comments: Lower legs wrapped   Skin:     General: Skin is warm and dry.   Neurological:      General: No focal deficit present.      Mental Status: She is alert and oriented to person, place, and time.   Psychiatric:         Mood and Affect: Mood normal.         Behavior: Behavior normal.         Thought Content: Thought content normal.     Results Review  I reviewed the patient's new clinical results.  Results from last 7 days   Lab Units 23  0515 23  0512 23  0404 23  0412   WBC 10*3/mm3 9.28 8.46 8.77 8.42   HEMOGLOBIN g/dL 11.8* 11.5* 11.1* 11.4*   PLATELETS 10*3/mm3 284 276 291 286       Results from last 7 days   Lab Units 23  0515 23  0512 23  0404 23  0412   SODIUM  mmol/L 136 134* 137 136   POTASSIUM mmol/L 4.1 3.9 4.2 3.6   CHLORIDE mmol/L 103 101 101 101   CO2 mmol/L 20.0* 20.9* 23.0 25.0   BUN mg/dL 36* 38* 36* 27*   CREATININE mg/dL 1.13* 1.21* 1.30* 1.13*   GLUCOSE mg/dL 112* 206* 282* 278*       Lab Results   Component Value Date    ANIONGAP 13.0 07/25/2023     Estimated Creatinine Clearance: 43.6 mL/min (A) (by C-G formula based on SCr of 1.13 mg/dL (H)).    Results from last 7 days   Lab Units 07/21/23  1846   ALBUMIN g/dL 3.9   BILIRUBIN mg/dL 0.4   ALK PHOS U/L 109   AST (SGOT) U/L 18   ALT (SGPT) U/L 10       Results from last 7 days   Lab Units 07/25/23  0515 07/24/23  0512 07/23/23  0404 07/22/23  0412 07/21/23  1846   CALCIUM mg/dL 9.2 9.1 9.1 9.0 8.7   ALBUMIN g/dL  --   --   --   --  3.9         Glucose   Date/Time Value Ref Range Status   07/25/2023 0833 186 (H) 70 - 130 mg/dL Final     Comment:     Meter: [id number] : [id number] Zachary Aletha MARLEY   07/25/2023 0500 114 70 - 130 mg/dL Final     Comment:     Meter: [id number] : [id number] Jamaica Zambrano NA   07/24/2023 2125 186 (H) 70 - 130 mg/dL Final     Comment:     Meter: [id number] : [id number] Jamaica Zambrano NA   07/24/2023 1607 167 (H) 70 - 130 mg/dL Final     Comment:     Meter: [id number] : [id number] Anna Farmer NA   07/24/2023 1155 365 (H) 70 - 130 mg/dL Final     Comment:     Meter: [id number] : [id number] Anna Farmer NA   07/24/2023 1111 345 (H) 70 - 130 mg/dL Final     Comment:     Meter: [id number] : [id number] Anna Farmer MARY   07/24/2023 0532 211 (H) 70 - 130 mg/dL Final     Comment:     Meter: [id number] : [id number] Perry Hernandez MARY       CT Chest With Contrast Diagnostic    Result Date: 7/24/2023  1. No suspicious right hilar or pulmonary mass. Mild right basilar atelectasis. 2. Numerous renal cortical lesions partly imaged. There is at least 1 intermediate density lesion. At this time, follow-up with pre and postcontrast CT or MR renal mass protocol is  recommended for characterization on a nonemergent basis. 3. Small sliding hiatal hernia.  Radiation dose reduction techniques were utilized, including automated exposure control and exposure modulation based on body size.        Scheduled Meds  amLODIPine, 5 mg, Oral, Daily  atorvastatin, 80 mg, Oral, Nightly  bisoprolol, 5 mg, Oral, Daily  chlorthalidone, 25 mg, Oral, Daily  DULoxetine, 30 mg, Oral, Daily  hydrocortisone-bacitracin-zinc oxide-nystatin, 1 application , Topical, BID  insulin glargine, 80 Units, Subcutaneous, Daily  insulin lispro, 18 Units, Subcutaneous, TID With Meals  insulin lispro, 3-14 Units, Subcutaneous, 4x Daily With Meals & Nightly  levothyroxine, 112 mcg, Oral, Once per day on Mon Tue Wed Thu Fri Sat  levothyroxine, 168 mcg, Oral, Once per day on Sun  lisinopril, 40 mg, Oral, Daily  pantoprazole, 40 mg, Oral, Q AM  pregabalin, 50 mg, Oral, TID  senna-docusate sodium, 2 tablet, Oral, BID  sodium chloride, 10 mL, Intravenous, Q12H  terazosin, 1 mg, Oral, Nightly  cyanocobalamin, 1,000 mcg, Oral, Daily    Continuous Infusions   PRN Meds    senna-docusate sodium **AND** polyethylene glycol **AND** bisacodyl **AND** bisacodyl    dextrose    dextrose    glucagon (human recombinant)    HYDROcodone-acetaminophen    melatonin    sodium chloride    sodium chloride     Diet  Diet: Cardiac Diets, Diabetic Diets; Healthy Heart (2-3 Na+); Consistent Carbohydrate; Texture: Regular Texture (IDDSI 7); Fluid Consistency: Thin (IDDSI 0)    I have personally reviewed:  [x]  Medications  [x]  Laboratory   []  Microbiology   []  Radiology  []  EKG/Telemetry   []  Cardiology/Vascular   []  Pathology   []  Records      Assessment/Plan     Active Hospital Problems    Diagnosis  POA    **Bilateral leg pain [M79.604, M79.605]  Yes    Hypertensive kidney disease with stage 3a chronic kidney disease [I12.9, N18.31]  Yes    Anxiety disorder [F41.9]  Yes    Noncompliance with medication regimen [Z91.148]  Not Applicable     Hyperlipidemia [E78.5]  Yes    Primary hypothyroidism [E03.9]  Yes    Type 2 diabetes mellitus with hyperglycemia, with long-term current use of insulin [E11.65, Z79.4]  Not Applicable    Benign essential hypertension [I10]  Yes      Resolved Hospital Problems   No resolved problems to display.     83 y.o. female with history of diabetes, neuropathy, diabetic foot ulcer, hypertension, hyperlipidemia, hypothyroidism, cognitive impairment, recurrent UTIs presented with bilateral lower extremity swelling and pain since May and decreased activity due to pain. She was in the hospital in May for lower extremity cellulitis declined SNF recommendation.    Leg pain  Venous insufficiency  -Outpatient Doppler negative for DVT  -Right lower leg films severe degenerative changes at the knee    Abnormal chest x-ray  -Radiology recommended CT with contrast   -CT scan no suspicious right hilar pulmonary mass. Mild right basilar atelectasis.     Bilateral renal lesions seen on above CT imaging  -CT scan ordered today to avoid too much contrast in one day  -Monitor renal function with CKD (creatinine stable)    DM2 with neuropathy  -Has been noncompliant as an outpatient  -A1c 11.80% last month  -Adjusted long-acting and mealtime insulin and glucoses improved today.   -Continue correctional insulin   -No more adjustments today monitor overnight adjust further if needed    Hypertension  -Continue medications  -Not at goal but acceptable monitor for now   -Do not want to increase amlodipine because of leg swelling, no beta-blocker increase due to lowish heart rates, on max lisinopril. Consider increasing chlorthalidone maybe tomorrow if creatinine is okay     Hyperlipidemia  -Statin    Hypothyroidism  -Levothyroxine    CKD 3A  -Continue to monitor (received contrast yesterday and will get today)    SCDs for DVT prophylaxis    Discussed with patient and nursing staff    Discharge: SNF. Pre-CERT obtained. Await CT scan result (CT is  behind probably tomorrow)    Efe Moses MD  [facility]  07/25/23

## 2023-07-25 NOTE — PLAN OF CARE
Goal Outcome Evaluation:  Plan of Care Reviewed With: patient        Progress: improving  Outcome Evaluation: vss, afebrile, axox4, resting between care, purewick w/ good urine output, no BM tonight-prn Miralax given, turning q2h to prevent skin breakdown, monitoring blood sugars, encouraged, IS use, daily weights, possible d/c today, morning labs ordered

## 2023-07-25 NOTE — THERAPY TREATMENT NOTE
Acute Care - Occupational Therapy Lymphedema Treatment Note  [facility]     Patient Name: Halie Guidry  : 1940  MRN: [id number]  Today's Date: 2023             Admit Date: 2023       ICD-10-CM ICD-9-CM   1. Bilateral leg pain  M79.604 729.5    M79.605    2. Unable to ambulate  R26.2 719.7   3. Peripheral edema  R60.9 782.3     Patient Active Problem List   Diagnosis    Compression fracture    Lumbar degenerative disc disease    Type 2 diabetes mellitus with hyperglycemia, with long-term current use of insulin    Hyperlipidemia    Benign essential hypertension    Primary hypothyroidism    Leukocytosis    Neuropathy    Osteoporosis    Proteinuria    Tobacco abuse    Diabetic eye exam    Generalized weakness    Spinal stenosis    Scoliosis    Arthritis    VBI (vertebrobasilar insufficiency)    Orthostatic hypotension    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    Severe hypothyroidism    Noncompliance with medication regimen    Vitamin D deficiency disease    Altered mental status    Tremor    Seizure    Stage 3a chronic kidney disease    Thoracic degenerative disc disease    Metabolic encephalopathy    VIPUL (acute kidney injury)    Hyperglycemia    Type II diabetes mellitus, uncontrolled    Lower abdominal pain    Transaminitis    COVID-19 virus detected    UTI (urinary tract infection)    Emphysematous cystitis    Choledocholithiasis    Hypokalemia    Hypoxia    Generalized abdominal pain    History of Clostridium difficile infection    History of ERCP    Acute UTI (urinary tract infection)    Hypomagnesemia    Burning pain    Anxiety disorder    Neuropathic pain    Intertrigo    Weakness of both lower extremities    Accelerated hypertension    Acute cystitis without hematuria    Altered mental status, unspecified altered mental status type    Left lower lobe pneumonia    Acute pain of left foot    Cellulitis and abscess of left lower extremity    Hypertensive kidney disease  with stage 3a chronic kidney disease    Bilateral leg pain     Past Medical History:   Diagnosis Date    Acute metabolic encephalopathy 6/24/2022    Anxiety     Arthritis     Benign essential hypertension 08/20/2014    Bleeding disorder     Depression     Diabetes     Diabetes mellitus, type 2     Disc degeneration, lumbar     Headache, tension-type     Hyperlipidemia     Hypothyroidism     Neuropathy     Osteoporosis 09/09/2015    Peripheral neuropathy     Rotator cuff tear, left     Scoliosis     Shoulder pain     LEFT, TORN ROTATOR CUFF S/P FALL    Spinal stenosis      Past Surgical History:   Procedure Laterality Date    APPENDECTOMY      BILATERAL BREAST REDUCTION Bilateral 08/2015    CATARACT EXTRACTION  03/2015    CHOLECYSTECTOMY WITH INTRAOPERATIVE CHOLANGIOGRAM N/A 3/27/2022    Procedure: CHOLECYSTECTOMY LAPAROSCOPIC INTRAOPERATIVE CHOLANGIOGRAM;  Surgeon: Aiyana Hill MD;  Location: [facility] OR;  Service: General;  Laterality: N/A;    COLONOSCOPY  06/05/2015    WNL    ERCP N/A 2/25/2022    Procedure: ENDOSCOPIC RETROGRADE CHOLANGIOPANCREATOGRAPHY with sphincterotomy and balloon sweep;  Surgeon: Chilo Wilhelm MD;  Location: [facility] ENDOSCOPY;  Service: Gastroenterology;  Laterality: N/A;  PRE/POST - CBD stones    ERCP N/A 3/28/2022    Procedure: ENDOSCOPIC RETROGRADE CHOLANGIOPANCREATOGRAPHY WITH SPHINCTEROTOMY AND BALLOON SWEEP;  Surgeon: Chilo Wilhelm MD;  Location: [facility] ENDOSCOPY;  Service: Gastroenterology;  Laterality: N/A;  PRE: COMMON DUCT STONE  POST: COMMON DUCT STONE    KYPHOPLASTY      REDUCTION MAMMAPLASTY      TONSILLECTOMY          Lymphedema       Row Name 07/25/23 1500 07/24/23 1422          Subjective Pain    Able to rate subjective pain? yes  -KP --     Pre-Treatment Pain Level 0  -KP --     Post-Treatment Pain Level 0  -KP --     Subjective Pain Comment -- reports very tender to touch.   some grimace during edema assessment.   no grimace once began wrapping.  -GRACE      Recorded by [KP] Christina Redmond OTR [LE] Gwendolyn Cheney OTR            Subjective Comments    Subjective Comments -- pt agreeable to intiating lymph bandaging.  -LE     Recorded by  [LE] Gwendolyn Cheney OTR            Vital Signs    O2 Delivery Pre Treatment -- room air  -LE     Pre Patient Position -- Supine  -LE     Intra Patient Position -- Side Lying  -LE     Post Patient Position -- Supine  -LE     Recorded by  [LE] Gwendolyn Cheney OTR            Lymphedema Assessment    Lymphedema Classification -- RLE:;LLE:  -LE     Lymphedema Assessment Comments -- ordering MD present during lymph wrapping.  -LE     Recorded by  [LE] Gwendolyn Cheney OTR            Lymphedema Edema Assessment    Pitting Edema Mild  - Mild  -LE     Edema Assessment Comment pt LEs have decreased today in edema and pt reports they look a lot better.  - pt reports B LE edema for years with worsening past months.  has been wearing zip up compression stockings she ordered from [employer].  -LE     Recorded by [KP] Christina Redmond OTR [LE] Gwendolyn Cheney OTR            Skin Changes/Observations    Location/Assessment Lower Extremity  - --     Lower Extremity Conditions bilateral:;clean;shiny;dry;hairless  - --     Skin Observations Comment B skin intact. bandage to L big toe.  - B LE skin intact and dry.  has bandage over L sesamoid as pt being treated for wound. per RN note clean, dry and intact.   OT started bandaging just below bandaging at mid forefoot to allow access for nsg to monitor/bandage wound.  -LE     Recorded by [KP] Christina Redmond OTR [LE] Gwendolyn Cheney OTR            Compression/Skin Care    Compression/Skin Care skin care;wrapping location;bandaging;compression garment;remove bandages  - skin care;wrapping location;bandaging;compression garment  -LE     Skin Care washed/dried;moisturizing lotion applied  -KP washed/dried;moisturizing lotion applied  -LE     Wrapping Location lower extremity  - lower  "extremity  -LE     Wrapping Location LE bilateral:;foot to knee  -KP bilateral:;foot to knee  -LE     Wrapping Comments unwrapped and washed legs and applied lotion. re wrapped. legs looking better per pt report  -KP --     Bandage Layers padding/fluff layer  -KP padding/fluff layer;cotton liner  -LE     Bandaging Comments B LE wrapped foot to knee. TG9 stockinette, artiflex 10cm and partial of a 15 cm. Rosidal 8cm and 2 10 cm per LE.  -KP each leg wrapped with TG9 stockinette, (1) Artiflex 10cm and (1/2) Artiflex 15 cm, (1) 8cm comprilan and (2) 10 cm comprilan.  -LE     Bandaging Technique moderate compression  -KP light compression  -LE     Compression Garment Comments -- pt reports wrapping feels \"good\".  ed RN and pt on precuations and to remove if intense itching or circulation concerns.  Advise RN and Pt to save all materials if have to remove anything.  printed wrapping instructions left with pt as plan to d/c to rehab.  -LE     Recorded by [KP] Christina Redmond, OTR [LE] Gwendolyn Cheney, OTR               User Key  (r) = Recorded By, (t) = Taken By, (c) = Cosigned By      Initials Name Effective Dates    Gwendolyn Multani, OTR 06/16/21 -     KP Christina Redmond, OTR 07/11/23 -                     OT ASSESSMENT FLOWSHEET (last 12 hours)       OT Evaluation and Treatment       Row Name 07/25/23 1769                   OT Time and Intention    Subjective Information complains of;fatigue  -KP        Document Type therapy note (daily note)  -        Mode of Treatment individual therapy;occupational therapy  -KP        Patient Effort good  -KP        Comment pt reports her LEs look a lot better, less swollen  -KP           General Information    Existing Precautions/Restrictions fall  -KP           Cognition    Affect/Mental Status (Cognition) WFL  -KP        Orientation Status (Cognition) oriented x 3  -KP        Follows Commands (Cognition) WFL  -KP           Bed Mobility    Comment, (Bed Mobility) pt " declines any bed mobility, worn out from bowel issues earlier and bad night sleep  -KP           Wound 07/21/23 2259 lateral foot Diabetic Ulcer    Wound - Properties Group Placement Date: 07/21/23 -KG Placement Time: 2259  -KG Present on Hospital Admission: Y  -KG Orientation: lateral  -KG Location: foot  -KG Primary Wound Type: Diabetic ulc  -KG    Retired Wound - Properties Group Placement Date: 07/21/23 -KG Placement Time: 2259  -KG Present on Hospital Admission: Y  -KG Orientation: lateral  -KG Location: foot  -KG Primary Wound Type: Diabetic ulc  -KG    Retired Wound - Properties Group Date first assessed: 07/21/23 -KG Time first assessed: 2259  -KG Present on Hospital Admission: Y  -KG Location: foot  -KG Primary Wound Type: Diabetic ulc  -KG       Plan of Care Review    Plan of Care Reviewed With patient  -KP        Progress improving  -KP        Outcome Evaluation pt LEs un wrapped, washed, and applied lotion, LE re wrapped. pt reports decr in swelling and they look a lot better per pt report. pt not leaving today due to needing a CT but CT is backed up w pts. possible dc tomorrow if CT completed and read. pt did not want to complete any regular therapy today .  -KP           Positioning and Restraints    Pre-Treatment Position in bed  -KP        Post Treatment Position bed  -KP        In Bed notified nsg;supine;call light within reach;encouraged to call for assist;exit alarm on  -KP                  User Key  (r) = Recorded By, (t) = Taken By, (c) = Cosigned By      Initials Name Effective Dates     Christina Redmond, OTR 07/11/23 -     Brigid Lau, RN 05/16/23 -                      Occupational Therapy Education       Title: PT OT SLP Therapies (In Progress)       Topic: Occupational Therapy (In Progress)       Point: ADL training (Done)       Description:   Instruct learner(s) on proper safety adaptation and remediation techniques during self care or transfers.   Instruct in proper use  of assistive devices.                  Learning Progress Summary             Patient Acceptance, E, VU by  at 7/22/2023 1347    Comment: role of OT, d/c rec                         Point: Home exercise program (Not Started)       Description:   Instruct learner(s) on appropriate technique for monitoring, assisting and/or progressing therapeutic exercises/activities.                  Learner Progress:  Not documented in this visit.              Point: Precautions (Done)       Description:   Instruct learner(s) on prescribed precautions during self-care and functional transfers.                  Learning Progress Summary             Patient Acceptance, E, VU by  at 7/22/2023 1347    Comment: role of OT, d/c rec                         Point: Body mechanics (Done)       Description:   Instruct learner(s) on proper positioning and spine alignment during self-care, functional mobility activities and/or exercises.                  Learning Progress Summary             Patient Acceptance, E, VU by  at 7/22/2023 1347    Comment: role of OT, d/c rec                                         User Key       Initials Effective Dates Name Provider Type Discipline     08/20/21 -  Arcelia Valentin OT Occupational Therapist OT                      OT Recommendation and Plan     Plan of Care Review  Plan of Care Reviewed With: patient  Progress: improving  Outcome Evaluation: pt LEs un wrapped, washed, and applied lotion, LE re wrapped. pt reports decr in swelling and they look a lot better per pt report. pt not leaving today due to needing a CT but CT is backed up w pts. possible dc tomorrow if CT completed and read. pt did not want to complete any regular therapy today .  Plan of Care Reviewed With: patient  Outcome Evaluation: pt LEs un wrapped, washed, and applied lotion, LE re wrapped. pt reports decr in swelling and they look a lot better per pt report. pt not leaving today due to needing a CT but CT is backed up w  pts. possible dc tomorrow if CT completed and read. pt did not want to complete any regular therapy today .            Time Calculation:     Time Calculation- OT       Row Name 07/25/23 1549             Time Calculation-     OT Start Time 1415  -      OT Stop Time 1457  -      OT Time Calculation (min) 42 min  -      Total Timed Code Minutes- OT 42 minute(s)  -      OT Received On 07/25/23  -      OT - Next Appointment 07/26/23  -                User Key  (r) = Recorded By, (t) = Taken By, (c) = Cosigned By      Initials Name Provider Type    Christina Davis OTR Occupational Therapist                    Therapy Charges for Today       Code Description Service Date Service Provider Modifiers Qty    [id number] HC OT MULTI LAYER COMP SYS BELOW KNEE 7/25/2023 Christina Redmond OTR  3                        GREGG Novak  7/25/2023

## 2023-07-26 ENCOUNTER — APPOINTMENT (OUTPATIENT)
Dept: CT IMAGING | Facility: HOSPITAL | Age: 83
End: 2023-07-26
Payer: MEDICARE

## 2023-07-26 LAB
ANION GAP SERPL CALCULATED.3IONS-SCNC: 12.4 MMOL/L (ref 5–15)
BUN SERPL-MCNC: 37 MG/DL (ref 8–23)
BUN/CREAT SERPL: 31.4 (ref 7–25)
CALCIUM SPEC-SCNC: 8.7 MG/DL (ref 8.6–10.5)
CHLORIDE SERPL-SCNC: 103 MMOL/L (ref 98–107)
CO2 SERPL-SCNC: 21.6 MMOL/L (ref 22–29)
CREAT SERPL-MCNC: 1.18 MG/DL (ref 0.57–1)
DEPRECATED RDW RBC AUTO: 42.7 FL (ref 37–54)
EGFRCR SERPLBLD CKD-EPI 2021: 45.9 ML/MIN/1.73
ERYTHROCYTE [DISTWIDTH] IN BLOOD BY AUTOMATED COUNT: 14 % (ref 12.3–15.4)
GLUCOSE BLDC GLUCOMTR-MCNC: 143 MG/DL (ref 70–130)
GLUCOSE BLDC GLUCOMTR-MCNC: 156 MG/DL (ref 70–130)
GLUCOSE BLDC GLUCOMTR-MCNC: 160 MG/DL (ref 70–130)
GLUCOSE BLDC GLUCOMTR-MCNC: 185 MG/DL (ref 70–130)
GLUCOSE BLDC GLUCOMTR-MCNC: 228 MG/DL (ref 70–130)
GLUCOSE SERPL-MCNC: 179 MG/DL (ref 65–99)
HCT VFR BLD AUTO: 34.9 % (ref 34–46.6)
HGB BLD-MCNC: 11.3 G/DL (ref 12–15.9)
MCH RBC QN AUTO: 27.3 PG (ref 26.6–33)
MCHC RBC AUTO-ENTMCNC: 32.4 G/DL (ref 31.5–35.7)
MCV RBC AUTO: 84.3 FL (ref 79–97)
PLATELET # BLD AUTO: 284 10*3/MM3 (ref 140–450)
PMV BLD AUTO: 9.8 FL (ref 6–12)
POTASSIUM SERPL-SCNC: 4 MMOL/L (ref 3.5–5.2)
RBC # BLD AUTO: 4.14 10*6/MM3 (ref 3.77–5.28)
SODIUM SERPL-SCNC: 137 MMOL/L (ref 136–145)
WBC NRBC COR # BLD: 9.06 10*3/MM3 (ref 3.4–10.8)

## 2023-07-26 PROCEDURE — 74178 CT ABD&PLV WO CNTR FLWD CNTR: CPT

## 2023-07-26 PROCEDURE — 63710000001 INSULIN GLARGINE PER 5 UNITS: Performed by: INTERNAL MEDICINE

## 2023-07-26 PROCEDURE — 25510000001 IOPAMIDOL 61 % SOLUTION: Performed by: INTERNAL MEDICINE

## 2023-07-26 PROCEDURE — 29581 APPL MULTLAYER CMPRN SYS LEG: CPT | Performed by: OCCUPATIONAL THERAPIST

## 2023-07-26 PROCEDURE — 63710000001 INSULIN LISPRO (HUMAN) PER 5 UNITS: Performed by: HOSPITALIST

## 2023-07-26 PROCEDURE — 80048 BASIC METABOLIC PNL TOTAL CA: CPT | Performed by: HOSPITALIST

## 2023-07-26 PROCEDURE — 85027 COMPLETE CBC AUTOMATED: CPT | Performed by: HOSPITALIST

## 2023-07-26 PROCEDURE — 63710000001 INSULIN LISPRO (HUMAN) PER 5 UNITS: Performed by: NURSE PRACTITIONER

## 2023-07-26 PROCEDURE — 82948 REAGENT STRIP/BLOOD GLUCOSE: CPT

## 2023-07-26 RX ADMIN — INSULIN GLARGINE 65 UNITS: 100 INJECTION, SOLUTION SUBCUTANEOUS at 21:55

## 2023-07-26 RX ADMIN — BISOPROLOL FUMARATE 5 MG: 5 TABLET, FILM COATED ORAL at 09:58

## 2023-07-26 RX ADMIN — INSULIN LISPRO 18 UNITS: 100 INJECTION, SOLUTION INTRAVENOUS; SUBCUTANEOUS at 13:19

## 2023-07-26 RX ADMIN — CHLORTHALIDONE 25 MG: 25 TABLET ORAL at 09:58

## 2023-07-26 RX ADMIN — DULOXETINE HYDROCHLORIDE 30 MG: 30 CAPSULE, DELAYED RELEASE ORAL at 09:57

## 2023-07-26 RX ADMIN — INSULIN LISPRO 18 UNITS: 100 INJECTION, SOLUTION INTRAVENOUS; SUBCUTANEOUS at 18:45

## 2023-07-26 RX ADMIN — Medication 10 ML: at 09:53

## 2023-07-26 RX ADMIN — PREGABALIN 50 MG: 50 CAPSULE ORAL at 20:06

## 2023-07-26 RX ADMIN — TERAZOSIN 1 MG: 1 CAPSULE ORAL at 20:06

## 2023-07-26 RX ADMIN — LISINOPRIL 40 MG: 40 TABLET ORAL at 09:57

## 2023-07-26 RX ADMIN — HYDROCODONE BITARTRATE AND ACETAMINOPHEN 1 TABLET: 5; 325 TABLET ORAL at 13:18

## 2023-07-26 RX ADMIN — ZINC OXIDE 1 APPLICATION: 200 OINTMENT TOPICAL at 09:59

## 2023-07-26 RX ADMIN — Medication 5 MG: at 20:06

## 2023-07-26 RX ADMIN — PREGABALIN 50 MG: 50 CAPSULE ORAL at 16:21

## 2023-07-26 RX ADMIN — ATORVASTATIN CALCIUM 80 MG: 20 TABLET, FILM COATED ORAL at 20:06

## 2023-07-26 RX ADMIN — INSULIN LISPRO 18 UNITS: 100 INJECTION, SOLUTION INTRAVENOUS; SUBCUTANEOUS at 09:52

## 2023-07-26 RX ADMIN — IOPAMIDOL 85 ML: 612 INJECTION, SOLUTION INTRAVENOUS at 08:46

## 2023-07-26 RX ADMIN — Medication 1000 MCG: at 09:58

## 2023-07-26 RX ADMIN — PANTOPRAZOLE SODIUM 40 MG: 40 TABLET, DELAYED RELEASE ORAL at 05:49

## 2023-07-26 RX ADMIN — INSULIN LISPRO 3 UNITS: 100 INJECTION, SOLUTION INTRAVENOUS; SUBCUTANEOUS at 09:52

## 2023-07-26 RX ADMIN — ZINC OXIDE 1 APPLICATION: 200 OINTMENT TOPICAL at 20:08

## 2023-07-26 RX ADMIN — AMLODIPINE BESYLATE 5 MG: 5 TABLET ORAL at 09:57

## 2023-07-26 RX ADMIN — INSULIN LISPRO 3 UNITS: 100 INJECTION, SOLUTION INTRAVENOUS; SUBCUTANEOUS at 13:19

## 2023-07-26 RX ADMIN — HYDROCODONE BITARTRATE AND ACETAMINOPHEN 1 TABLET: 5; 325 TABLET ORAL at 05:49

## 2023-07-26 RX ADMIN — Medication 10 ML: at 20:09

## 2023-07-26 RX ADMIN — LEVOTHYROXINE SODIUM 112 MCG: 0.11 TABLET ORAL at 05:49

## 2023-07-26 RX ADMIN — PREGABALIN 50 MG: 50 CAPSULE ORAL at 09:58

## 2023-07-26 RX ADMIN — HYDROCODONE BITARTRATE AND ACETAMINOPHEN 1 TABLET: 5; 325 TABLET ORAL at 18:54

## 2023-07-26 NOTE — PLAN OF CARE
Goal Outcome Evaluation:                Outcome Evaluation: HR bradycardic otherwise VSS. SL. OT wrapped legs today. Monitoring blood sugars. CT Abd done today. Wound care done to left foot. Up in chair.

## 2023-07-26 NOTE — PLAN OF CARE
Goal Outcome Evaluation:  Plan of Care Reviewed With: patient        Progress: improving  Outcome Evaluation: VSS. On room air.  Alert and oriented. Norco given for pain. Wound dressing change to left foot . External catheter in place with good urine output. Had loose BM. Catheter care done. Magic cream applied to redness and excoriation in abdominal and breast folds. Saline locked. BSG monitored. Snacks taken Slept on and off.

## 2023-07-26 NOTE — CASE MANAGEMENT/SOCIAL WORK
Continued Stay Note  [facility]     Patient Name: Halie Guidry  MRN: [id number]  Today's Date: 7/26/2023    Admit Date: 7/21/2023    Plan: Charli Alfonso precert obtained and good for dc today   Discharge Plan       Row Name 07/26/23 1131       Plan    Plan Charli Alfonso precert obtained and good for dc today    Plan Comments Call placed to [facility].  Precert is still good for discharge today and admission to [place].  Kamilah SORIA                   Discharge Codes    No documentation.                 Expected Discharge Date and Time       Expected Discharge Date Expected Discharge Time    Jul 26, 2023               Kamilah Bennett RN

## 2023-07-26 NOTE — CASE MANAGEMENT/SOCIAL WORK
Continued Stay Note  [facility]     Patient Name: Halie Guidry  MRN: [id number]  Today's Date: 7/26/2023    Admit Date: 7/21/2023    Plan: [place]- [facility], pre-cert obtained and bed available 7/26, [facility] scheduled for 2100. Pending CT results   Discharge Plan       Row Name 07/26/23 1526       Plan    Plan [facility], pre-cert obtained and bed available 7/26, [facility] scheduled for 2100. Pending CT results    Patient/Family in Agreement with Plan yes    Plan Comments Plan is DC to [place] today pending CT results, awaiting CT results to be read and MD to approve for DC. [facility] scheduled for 2100 pending CT results cleared and patient ready for DC. Updated RN and LHA. CCP to follow. [facility] Jesus BREWSTER RN      Row Name 07/26/23 1458       Plan    Plan [place]- [facility], pre-cert obtained and bed available 7/26. [facility] scheduled for 1430. --- pending CT results    Patient/Family in Agreement with Plan yes    Plan Comments Spoke with [facility] who stated pre-cert is approved and good for today and bed is available. Spoke with Kamilah RODRIGUEZ/ODIN who confirmed pre-cert is approved. Updated LHA and RN who are waiting on CT results to be read and then patient is ready for DC. CCP scheduled [facility] at 1630 for transport to [place]. CCP called to Radiology and requested CT be read STAT, awaiting results. CCP to follow. Jesus BREWSTER RN                   Discharge Codes    No documentation.                 Expected Discharge Date and Time       Expected Discharge Date Expected Discharge Time    Jul 26, 2023               Jesus Dutta RN

## 2023-07-26 NOTE — CASE MANAGEMENT/SOCIAL WORK
Continued Stay Note  [facility]     Patient Name: Halie Guidry  MRN: [id number]  Today's Date: 7/26/2023    Admit Date: 7/21/2023    Plan: [place]- [facility], pre-cert obtained and bed available 7/26. [facility] scheduled for 1430. --- pending CT results   Discharge Plan       Row Name 07/26/23 1456       Plan    Plan [place]- [facility], pre-cert obtained and bed available 7/26. [facility] scheduled for 1430. --- pending CT results    Patient/Family in Agreement with Plan yes    Plan Comments Spoke with [facility] who stated pre-cert is approved and good for today and bed is available. Spoke with Kamilah RODRIGUEZ/ODIN who confirmed pre-cert is approved. Updated LHA and RN who are waiting on CT results to be read and then patient is ready for DC. CCP scheduled [facility]  at 1630 for transport to [place]. CCP called to Radiology and requested CT be read STAT, awaiting results. CCP to follow. Jesus BREWSTER RN                   Discharge Codes    No documentation.                 Expected Discharge Date and Time       Expected Discharge Date Expected Discharge Time    Jul 26, 2023               Jesus Dutta RN

## 2023-07-26 NOTE — PROGRESS NOTES
7/26/2023 1640 - Per Dr. Sanchez, pt will not discharge today.Called to Jesus/CCP and left m to update. Called to Arielle/patient's RN to update. Beena/manager called to [facility] [place] [facility] and cancelled 9 pm run.

## 2023-07-26 NOTE — THERAPY TREATMENT NOTE
Acute Care - Occupational Therapy Lymphedema Treatment Note  [facility]     Patient Name: Halie Guidry  : 1940  MRN: [id number]  Today's Date: 2023             Admit Date: 2023       ICD-10-CM ICD-9-CM   1. Bilateral leg pain  M79.604 729.5    M79.605    2. Unable to ambulate  R26.2 719.7   3. Peripheral edema  R60.9 782.3     Patient Active Problem List   Diagnosis    Compression fracture    Lumbar degenerative disc disease    Type 2 diabetes mellitus with hyperglycemia, with long-term current use of insulin    Hyperlipidemia    Benign essential hypertension    Primary hypothyroidism    Leukocytosis    Neuropathy    Osteoporosis    Proteinuria    Tobacco abuse    Diabetic eye exam    Generalized weakness    Spinal stenosis    Scoliosis    Arthritis    VBI (vertebrobasilar insufficiency)    Orthostatic hypotension    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    Severe hypothyroidism    Noncompliance with medication regimen    Vitamin D deficiency disease    Altered mental status    Tremor    Seizure    Stage 3a chronic kidney disease    Thoracic degenerative disc disease    Metabolic encephalopathy    VIPUL (acute kidney injury)    Hyperglycemia    Type II diabetes mellitus, uncontrolled    Lower abdominal pain    Transaminitis    COVID-19 virus detected    UTI (urinary tract infection)    Emphysematous cystitis    Choledocholithiasis    Hypokalemia    Hypoxia    Generalized abdominal pain    History of Clostridium difficile infection    History of ERCP    Acute UTI (urinary tract infection)    Hypomagnesemia    Burning pain    Anxiety disorder    Neuropathic pain    Intertrigo    Weakness of both lower extremities    Accelerated hypertension    Acute cystitis without hematuria    Altered mental status, unspecified altered mental status type    Left lower lobe pneumonia    Acute pain of left foot    Cellulitis and abscess of left lower extremity    Hypertensive kidney disease  with stage 3a chronic kidney disease    Bilateral leg pain     Past Medical History:   Diagnosis Date    Acute metabolic encephalopathy 6/24/2022    Anxiety     Arthritis     Benign essential hypertension 08/20/2014    Bleeding disorder     Depression     Diabetes     Diabetes mellitus, type 2     Disc degeneration, lumbar     Headache, tension-type     Hyperlipidemia     Hypothyroidism     Neuropathy     Osteoporosis 09/09/2015    Peripheral neuropathy     Rotator cuff tear, left     Scoliosis     Shoulder pain     LEFT, TORN ROTATOR CUFF S/P FALL    Spinal stenosis      Past Surgical History:   Procedure Laterality Date    APPENDECTOMY      BILATERAL BREAST REDUCTION Bilateral 08/2015    CATARACT EXTRACTION  03/2015    CHOLECYSTECTOMY WITH INTRAOPERATIVE CHOLANGIOGRAM N/A 3/27/2022    Procedure: CHOLECYSTECTOMY LAPAROSCOPIC INTRAOPERATIVE CHOLANGIOGRAM;  Surgeon: Aiyana Hill MD;  Location: [facility] MAIN OR;  Service: General;  Laterality: N/A;    COLONOSCOPY  06/05/2015    WNL    ERCP N/A 2/25/2022    Procedure: ENDOSCOPIC RETROGRADE CHOLANGIOPANCREATOGRAPHY with sphincterotomy and balloon sweep;  Surgeon: Chilo Wilhelm MD;  Location: [facility] ENDOSCOPY;  Service: Gastroenterology;  Laterality: N/A;  PRE/POST - CBD stones    ERCP N/A 3/28/2022    Procedure: ENDOSCOPIC RETROGRADE CHOLANGIOPANCREATOGRAPHY WITH SPHINCTEROTOMY AND BALLOON SWEEP;  Surgeon: Chilo Wilhelm MD;  Location: [facility] ENDOSCOPY;  Service: Gastroenterology;  Laterality: N/A;  PRE: COMMON DUCT STONE  POST: COMMON DUCT STONE    KYPHOPLASTY      REDUCTION MAMMAPLASTY      TONSILLECTOMY          Lymphedema       Row Name 07/26/23 1200 07/25/23 1500 07/24/23 1569       Subjective Pain    Able to rate subjective pain? yes  -KP yes  -KP --    Pre-Treatment Pain Level 3  -KP 0  -KP --    Post-Treatment Pain Level 3  -KP 0  -KP --    Subjective Pain Comment B LE  -KP -- reports very tender to touch.   some grimace during edema assessment.    no grimace once began wrapping.  -LE    Recorded by [KP] Christina Redmond, OTR [KP] Christina Redmond, OTR [LE] Gwendolyn Cheney OTR       Subjective Comments    Subjective Comments pt agrees to wraps and reports legs look better.  - -- pt agreeable to intiating lymph bandaging.  -LE    Recorded by [KP] Christina Redmond, OTR  [LE] Gwendolyn Cheney OTR       Vital Signs    O2 Delivery Pre Treatment -- -- room air  -LE    Pre Patient Position -- -- Supine  -LE    Intra Patient Position -- -- Side Lying  -LE    Post Patient Position -- -- Supine  -LE    Recorded by   [LE] Gwendolyn Cheney OTR       Lymphedema Assessment    Lymphedema Classification -- -- RLE:;LLE:  -LE    Lymphedema Assessment Comments -- -- ordering MD present during lymph wrapping.  -LE    Recorded by   [LE] Gwendolyn Cheney OTR       Lymphedema Edema Assessment    Pitting Edema Mild  - Mild  - Mild  -LE    Edema Assessment Comment pt LE decreased in swelling, still red and tender.  - pt LEs have decreased today in edema and pt reports they look a lot better.  - pt reports B LE edema for years with worsening past months.  has been wearing zip up compression stockings she ordered from [employer].  -LE    Recorded by [KP] Christina Redmond OTR [KP] Christina Redmond, TOMÁSR [LE] Gwendolyn Cheney OTR       Skin Changes/Observations    Location/Assessment Lower Extremity  - Lower Extremity  - --    Lower Extremity Conditions bilateral:;clean;shiny;dry;hairless  - bilateral:;clean;shiny;dry;hairless  - --    Skin Observations Comment bandage to L toe  -KP B skin intact. bandage to L big toe.  - B LE skin intact and dry.  has bandage over L sesamoid as pt being treated for wound. per RN note clean, dry and intact.   OT started bandaging just below bandaging at mid forefoot to allow access for nsg to monitor/bandage wound.  -LE    Recorded by [KP] Christina Redmond, TOMÁSR [KP] Christina Redmond OTR [LE] Gwendolyn Cheney OTR  "      Compression/Skin Care    Compression/Skin Care skin care;wrapping location;bandaging;compression garment;remove bandages  -KP skin care;wrapping location;bandaging;compression garment;remove bandages  -KP skin care;wrapping location;bandaging;compression garment  -LE    Skin Care washed/dried;moisturizing lotion applied  -KP washed/dried;moisturizing lotion applied  -KP washed/dried;moisturizing lotion applied  -LE    Wrapping Location lower extremity  -KP lower extremity  -KP lower extremity  -LE    Wrapping Location LE bilateral:;foot to knee  -KP bilateral:;foot to knee  -KP bilateral:;foot to knee  -LE    Wrapping Comments unwrapped LEs, washed , lotion applied and re wrapped  -KP unwrapped and washed legs and applied lotion. re wrapped. legs looking better per pt report  -KP --    Bandage Layers padding/fluff layer  -KP padding/fluff layer  -KP padding/fluff layer;cotton liner  -LE    Bandaging Comments B LE foot to knee  -KP B LE wrapped foot to knee. TG9 stockinette, artiflex 10cm and partial of a 15 cm. Rosidal 8cm and 2 10 cm per LE.  -KP each leg wrapped with TG9 stockinette, (1) Artiflex 10cm and (1/2) Artiflex 15 cm, (1) 8cm comprilan and (2) 10 cm comprilan.  -LE    Bandaging Technique circumferential/spiral;light compression;moderate compression  -KP moderate compression  -KP light compression  -LE    Compression Garment Comments pt reports legs are looking better, are still painful shawn to the touch and when washing her legs. pt wrapped w TG9 stockinette, 10cm artiflex, and partial of a 15 cm artiflex, 8cm rosidal per LE and two 10cm rosidal per LE.  -KP -- pt reports wrapping feels \"good\".  ed RN and pt on precuations and to remove if intense itching or circulation concerns.  Advise RN and Pt to save all materials if have to remove anything.  printed wrapping instructions left with pt as plan to d/c to rehab.  -LE    Recorded by [KP] Christina Redmond, OTR [KP] Christina Redmond, OTR " [LE] Gwendolyn Cheney, OTR              User Key  (r) = Recorded By, (t) = Taken By, (c) = Cosigned By      Initials Name Effective Dates    GRACE ShravanGwendolyn, OTR 06/16/21 -     KP Nyla Christina Crley, OTR 07/11/23 -                     OT ASSESSMENT FLOWSHEET (last 12 hours)       OT Evaluation and Treatment       Row Name 07/26/23 1216                   OT Time and Intention    Subjective Information no complaints  -        Document Type therapy note (daily note)  -        Mode of Treatment individual therapy;occupational therapy  -        Patient Effort good  -KP        Comment pt reports her legs hurt last night and had to wait for pain meds, reports they have been hurting since the cellulitis started  -           General Information    Existing Precautions/Restrictions fall  -KP           Pain Assessment    Pretreatment Pain Rating 3/10  -KP        Posttreatment Pain Rating 3/10  -KP        Pain Location - Side/Orientation Bilateral  -        Pain Location lower  -        Pain Location - extremity  -KP        Pre/Posttreatment Pain Comment rest, un wrap  -KP           Cognition    Affect/Mental Status (Cognition) WFL  -KP        Orientation Status (Cognition) oriented x 3  -KP        Follows Commands (Cognition) WFL  -           Functional Mobility    Functional Mobility- Comment [facility]  -           Wound 07/21/23 2259 lateral foot Diabetic Ulcer    Wound - Properties Group Placement Date: 07/21/23  -KG Placement Time: 2259  -KG Present on Hospital Admission: Y  -KG Orientation: lateral  -KG Location: foot  -KG Primary Wound Type: Diabetic ulc  -KG    Retired Wound - Properties Group Placement Date: 07/21/23  -KG Placement Time: 2259  -KG Present on Hospital Admission: Y  -KG Orientation: lateral  -KG Location: foot  -KG Primary Wound Type: Diabetic ulc  -KG    Retired Wound - Properties Group Date first assessed: 07/21/23  -KG Time first assessed: 2259  -KG Present on Hospital Admission: Y  -KG  Location: foot  -KG Primary Wound Type: Diabetic ulc  -KG       Plan of Care Review    Plan of Care Reviewed With patient  -KP        Progress improving  -KP        Outcome Evaluation pt legs un wrapped and legs appear to look better and pt agrees. still red and tender from cellulitis. pt legs washed, lotion applied, and re wrapped. minimal to moderate compression due to pain in legs last night. CT done this am and pt leaving tomorrow for rehab.  -KP           Positioning and Restraints    Pre-Treatment Position sitting in chair/recliner  -KP        Post Treatment Position chair  -KP        In Chair reclined;call light within reach;encouraged to call for assist;exit alarm on  -KP                  User Key  (r) = Recorded By, (t) = Taken By, (c) = Cosigned By      Initials Name Effective Dates    Christina Davis, OTR 07/11/23 -     KG Brigid Ramos, RN 05/16/23 -                      Occupational Therapy Education       Title: PT OT SLP Therapies (In Progress)       Topic: Occupational Therapy (In Progress)       Point: ADL training (Done)       Description:   Instruct learner(s) on proper safety adaptation and remediation techniques during self care or transfers.   Instruct in proper use of assistive devices.                  Learning Progress Summary             Patient Acceptance, E, VU by  at 7/22/2023 1347    Comment: role of OT, d/c rec                         Point: Home exercise program (Not Started)       Description:   Instruct learner(s) on appropriate technique for monitoring, assisting and/or progressing therapeutic exercises/activities.                  Learner Progress:  Not documented in this visit.              Point: Precautions (Done)       Description:   Instruct learner(s) on prescribed precautions during self-care and functional transfers.                  Learning Progress Summary             Patient Acceptance, E, VU by  at 7/22/2023 1347    Comment: role of OT, d/c rec                          Point: Body mechanics (Done)       Description:   Instruct learner(s) on proper positioning and spine alignment during self-care, functional mobility activities and/or exercises.                  Learning Progress Summary             Patient Acceptance, E, VU by  at 7/22/2023 6777    Comment: role of OT, d/c rec                                         User Key       Initials Effective Dates Name Provider Type Discipline     08/20/21 -  Arcelia Valentin OT Occupational Therapist OT                      OT Recommendation and Plan     Plan of Care Review  Plan of Care Reviewed With: patient  Progress: improving  Outcome Evaluation: pt legs un wrapped and legs appear to look better and pt agrees. still red and tender from cellulitis. pt legs washed, lotion applied, and re wrapped. minimal to moderate compression due to pain in legs last night. CT done this am and pt leaving tomorrow for rehab.  Plan of Care Reviewed With: patient  Outcome Evaluation: pt legs un wrapped and legs appear to look better and pt agrees. still red and tender from cellulitis. pt legs washed, lotion applied, and re wrapped. minimal to moderate compression due to pain in legs last night. CT done this am and pt leaving tomorrow for rehab.            Time Calculation:     Time Calculation- OT       Row Name 07/26/23 1226             Time Calculation-     OT Start Time 1112  -      OT Stop Time 1145  -      OT Time Calculation (min) 33 min  -      Total Timed Code Minutes- OT 33 minute(s)  -      OT Received On 07/26/23  -      OT - Next Appointment 07/27/23  -                User Key  (r) = Recorded By, (t) = Taken By, (c) = Cosigned By      Initials Name Provider Type    Christina Davis, OTR Occupational Therapist                    Therapy Charges for Today       Code Description Service Date Service Provider Modifiers Qty    [id number]  OT MULTI LAYER COMP SYS BELOW KNEE 7/25/2023 Christina Redmond  Phani, OTR  3    [id number]  OT MULTI LAYER COMP SYS BELOW KNEE 7/26/2023 Christina Redmond, OTR  2                        Christina Redmond, OTR  7/26/2023

## 2023-07-26 NOTE — CASE MANAGEMENT/SOCIAL WORK
"Physicians Statement of Medical Necessity for  Ambulance Transportation    GENERAL INFORMATION     Name: Halie Guidry  YOB: 1940  Medicare #:     [id number]     Transport Date: (Valid for round trips this date, or for scheduled repetitive trips for 60 days from the date signed below.)  Origin: [facility]  Destination: [address]. Is the Patient's stay covered under Medicare Part A (PPS/DRG?) NO   Closest appropriate facility?  Yes  If this a hosp-hosp transfer? No   Is this a hospice patient? No     MEDICAL NECESSITY QUESTIONAIRE    Ambulance Transportation is medically necessary only if other means of transportation are contraindicated or would be potentially harmful to the patient.  To meet this requirement, the patient must be either \"bed confined\" or suffer from a condition such that transport by means other than an ambulance is contraindicated by the patient's condition.  The following questions must be answered by the healthcare professional signing below for this form to be valid:     1) Describe the MEDICAL CONDITION (physical and/or mental) of this patient AT THE TIME OF AMBULANCE TRANSPORT that requires the patient to be transported in an ambulance, and why transport by other means is contraindicated by the patient's condition:   Past Medical History:   Diagnosis Date    Acute metabolic encephalopathy 6/24/2022    Anxiety     Arthritis     Benign essential hypertension 08/20/2014    Bleeding disorder     Depression     Diabetes     Diabetes mellitus, type 2     Disc degeneration, lumbar     Headache, tension-type     Hyperlipidemia     Hypothyroidism     Neuropathy     Osteoporosis 09/09/2015    Peripheral neuropathy     Rotator cuff tear, left     Scoliosis     Shoulder pain     LEFT, TORN ROTATOR CUFF S/P FALL    Spinal stenosis       Past Surgical History:   Procedure Laterality Date    APPENDECTOMY      BILATERAL BREAST REDUCTION " "Bilateral 08/2015    CATARACT EXTRACTION  03/2015    CHOLECYSTECTOMY WITH INTRAOPERATIVE CHOLANGIOGRAM N/A 3/27/2022    Procedure: CHOLECYSTECTOMY LAPAROSCOPIC INTRAOPERATIVE CHOLANGIOGRAM;  Surgeon: Aiyana Hill MD;  Location: [facility] OR;  Service: General;  Laterality: N/A;    COLONOSCOPY  06/05/2015    WNL    ERCP N/A 2/25/2022    Procedure: ENDOSCOPIC RETROGRADE CHOLANGIOPANCREATOGRAPHY with sphincterotomy and balloon sweep;  Surgeon: Chilo Wilhelm MD;  Location: [facility] ENDOSCOPY;  Service: Gastroenterology;  Laterality: N/A;  PRE/POST - CBD stones    ERCP N/A 3/28/2022    Procedure: ENDOSCOPIC RETROGRADE CHOLANGIOPANCREATOGRAPHY WITH SPHINCTEROTOMY AND BALLOON SWEEP;  Surgeon: Chilo Wilhelm MD;  Location: [facility] ENDOSCOPY;  Service: Gastroenterology;  Laterality: N/A;  PRE: COMMON DUCT STONE  POST: COMMON DUCT STONE    KYPHOPLASTY      REDUCTION MAMMAPLASTY      TONSILLECTOMY        2) Is this patient \"bed confined\" as defined below?no    To be \"bed confined\" the patient must satisfy all three of the following criteria:  (1) unable to get up from bed without assistance; AND (2) unable to ambulate;  AND (3) unable to sit in a chair or wheelchair.  3) Can this patient safely be transported by car or wheelchair van (I.e., may safely sit during transport, without an attendant or monitoring?)no   4. In addition to completing questions 1-3 above, please check any of the following conditions that apply*:          *Note: supporting documentation for any boxes checked must be maintained in the patient's medical records: Limited mobility, unable to safely transfer or ambulate       SIGNATURE OF PHYSICIAN OR OTHER AUTHORIZED HEALTHCARE PROFESSIONAL    I certify that the above information is true and correct based on my evaluation of this patient, and represent that the patient requires transport by ambulance and that other forms of transport are contraindicated.  I understand that this information " will be used by the Centers for Medicare and Medicaid Services (CMS) to support the determiniation of medical necessity for ambulance services, and I represent that I have personal knowledge of the patient's condition at the time of transport.       If this box is checked, I also certify that the patient is physically or mentally incapable of signing the ambulance service's claim form and that the institution with which I am affiliated has furnished care, services or assistance to the patient.  My signature below is made on behalf of the patient pursuant to 42 .36(b)(4). In accordance with 42 .37, the specific reason(s) that the patient is physically or mentally incapable of signing the claim for is as follows:    Signature of Physician or Healthcare Professional  Date/Time:        (For Scheduled repetitive transport, this form is not valid for transports performed more than 60 days after this date).                                                                                                                                            --------------------------------------------------------------------------------------------  Printed Name and Credentials of Physician or Authorized Healthcare Professional     *Form must be signed by patient's attending physician for scheduled, repetitive transports,.  For non-repetitive ambulance transports, if unable to obtain the signature of the attending physician, any of the following may sign (please select below):     Physician  Clinical Nurse Specialist  Registered Nurse     Physician Assistant  Discharge Planner  Licensed Practical Nurse     Nurse Practitioner

## 2023-07-26 NOTE — PROGRESS NOTES
Name: Halie Guidry ADMIT: 2023   : 1940  PCP: Napoleon Mead MD    MRN: [id number] LOS: 0 days   AGE/SEX: 83 y.o. female  ROOM: [facility]     Subjective   Subjective   No new complaints. Denies chest pain shortness of breath, palpitations, dizziness, numbness, tingling, abdominal pain.     Objective   Objective   Vital Signs  Temp:  [97.1 øF (36.2 øC)-98.4 øF (36.9 øC)] 97.7 øF (36.5 øC)  Heart Rate:  [54-61] 58  Resp:  [18] 18  BP: (138-159)/(64-68) 159/64  SpO2:  [92 %-97 %] 94 %  on   ;   Device (Oxygen Therapy): room air  Body mass index is 31.81 kg/mý.    Physical Exam  Constitutional:       General: She is not in acute distress.     Appearance: She is not toxic-appearing.   HENT:      Head: Normocephalic and atraumatic.   Eyes:      Extraocular Movements: Extraocular movements intact.   Cardiovascular:      Rate and Rhythm: Normal rate and regular rhythm.   Pulmonary:      Effort: Pulmonary effort is normal. No respiratory distress.      Breath sounds: Normal breath sounds. No wheezing or rhonchi.   Abdominal:      General: Bowel sounds are normal.      Palpations: Abdomen is soft.      Tenderness: There is no abdominal tenderness. There is no guarding or rebound.   Musculoskeletal:      Cervical back: Normal range of motion.      Comments: Lower legs wrapped   Skin:     General: Skin is warm and dry.   Neurological:      General: No focal deficit present.      Mental Status: She is alert and oriented to person, place, and time.   Psychiatric:         Mood and Affect: Mood normal.         Behavior: Behavior normal.         Thought Content: Thought content normal.     Results Review  I reviewed the patient's new clinical results.  Results from last 7 days   Lab Units 23  0457 23  0515 23  0512 23  0404   WBC 10*3/mm3 9.06 9.28 8.46 8.77   HEMOGLOBIN g/dL 11.3* 11.8* 11.5* 11.1*   PLATELETS 10*3/mm3 284 284 276 291       Results from last 7 days   Lab Units  07/26/23  0457 07/25/23  0515 07/24/23  0512 07/23/23  0404   SODIUM mmol/L 137 136 134* 137   POTASSIUM mmol/L 4.0 4.1 3.9 4.2   CHLORIDE mmol/L 103 103 101 101   CO2 mmol/L 21.6* 20.0* 20.9* 23.0   BUN mg/dL 37* 36* 38* 36*   CREATININE mg/dL 1.18* 1.13* 1.21* 1.30*   GLUCOSE mg/dL 179* 112* 206* 282*       Lab Results   Component Value Date    ANIONGAP 12.4 07/26/2023     Estimated Creatinine Clearance: 43.5 mL/min (A) (by C-G formula based on SCr of 1.18 mg/dL (H)).    Results from last 7 days   Lab Units 07/21/23  1846   ALBUMIN g/dL 3.9   BILIRUBIN mg/dL 0.4   ALK PHOS U/L 109   AST (SGOT) U/L 18   ALT (SGPT) U/L 10       Results from last 7 days   Lab Units 07/26/23  0457 07/25/23  0515 07/24/23  0512 07/23/23  0404 07/22/23  0412 07/21/23  1846   CALCIUM mg/dL 8.7 9.2 9.1 9.1   < > 8.7   ALBUMIN g/dL  --   --   --   --   --  3.9    < > = values in this interval not displayed.         Glucose   Date/Time Value Ref Range Status   07/26/2023 1705 143 (H) 70 - 130 mg/dL Final     Comment:     Meter: [id number] : [id number] Zachary Pompa [facility]   07/26/2023 1155 160 (H) 70 - 130 mg/dL Final     Comment:     Meter: [id number] : [id number] Zachary Pompa [facility]   07/26/2023 0630 185 (H) 70 - 130 mg/dL Final     Comment:     Meter: [id number] : [id number] [place] HERNANDEZ   07/26/2023 0510 228 (H) 70 - 130 mg/dL Final     Comment:     Meter: [id number] : [id number] [place] HERNANDEZ   07/25/2023 2121 116 70 - 130 mg/dL Final     Comment:     Meter: [id number] : [id number] [place] Zambrano NA   07/25/2023 1557 75 70 - 130 mg/dL Final     Comment:     Meter: [id number] : [id number] Zachary Aguileraanda CRUZA   07/25/2023 1105 217 (H) 70 - 130 mg/dL Final     Comment:     Meter: [id number] : [id number] Zachary Aletha CNA       CT Abdomen Pelvis With & Without Contrast    Result Date: 7/26/2023  1. There is a hyperenhancing mass at the pancreatic head measuring approximately 3.6 cm and there is a 2.8 cm  exophytic component or node adjacently. The appearance is suspicious for a neuroendocrine tumor. The mass significantly narrows the downstream portion of the common bile duct, but there is no intrahepatic biliary dilatation and there is pneumobilia. There is mild dilatation of the main pancreatic duct. For tissue diagnosis, endoscopic ultrasound with FNA is recommended. 2. There are a few slightly hyperdense right renal nodules which likely represent proteinaceous cysts. There are no enhancing or suspicious renal lesions.       Scheduled Meds  amLODIPine, 5 mg, Oral, Daily  atorvastatin, 80 mg, Oral, Nightly  bisoprolol, 5 mg, Oral, Daily  chlorthalidone, 25 mg, Oral, Daily  DULoxetine, 30 mg, Oral, Daily  hydrocortisone-bacitracin-zinc oxide-nystatin, 1 application , Topical, BID  insulin glargine, 65 Units, Subcutaneous, Nightly  insulin lispro, 18 Units, Subcutaneous, TID With Meals  insulin lispro, 3-14 Units, Subcutaneous, 4x Daily With Meals & Nightly  levothyroxine, 112 mcg, Oral, Once per day on Mon Tue Wed Thu Fri Sat  levothyroxine, 168 mcg, Oral, Once per day on Sun  lisinopril, 40 mg, Oral, Daily  pantoprazole, 40 mg, Oral, Q AM  pregabalin, 50 mg, Oral, TID  senna-docusate sodium, 2 tablet, Oral, BID  sodium chloride, 10 mL, Intravenous, Q12H  terazosin, 1 mg, Oral, Nightly  cyanocobalamin, 1,000 mcg, Oral, Daily    Continuous Infusions   PRN Meds    senna-docusate sodium **AND** polyethylene glycol **AND** bisacodyl **AND** bisacodyl    dextrose    dextrose    glucagon (human recombinant)    HYDROcodone-acetaminophen    melatonin    sodium chloride    sodium chloride     Diet  Diet: Cardiac Diets, Diabetic Diets; Healthy Heart (2-3 Na+); Consistent Carbohydrate; Texture: Regular Texture (IDDSI 7); Fluid Consistency: Thin (IDDSI 0)    I have personally reviewed:  [x]  Medications  [x]  Laboratory   []  Microbiology   []  Radiology  []  EKG/Telemetry   []  Cardiology/Vascular   []  Pathology   []   Records      Assessment/Plan     Active Hospital Problems    Diagnosis  POA    **Bilateral leg pain [M79.604, M79.605]  Yes    Hypertensive kidney disease with stage 3a chronic kidney disease [I12.9, N18.31]  Yes    Anxiety disorder [F41.9]  Yes    Noncompliance with medication regimen [Z91.148]  Not Applicable    Hyperlipidemia [E78.5]  Yes    Primary hypothyroidism [E03.9]  Yes    Type 2 diabetes mellitus with hyperglycemia, with long-term current use of insulin [E11.65, Z79.4]  Not Applicable    Benign essential hypertension [I10]  Yes      Resolved Hospital Problems   No resolved problems to display.     83 y.o. female with history of diabetes, neuropathy, diabetic foot ulcer, hypertension, hyperlipidemia, hypothyroidism, cognitive impairment, recurrent UTIs presented with bilateral lower extremity swelling and pain since May and decreased activity due to pain. She was in the hospital in May for lower extremity cellulitis declined SNF recommendation.    Leg pain  Venous insufficiency  -Outpatient Doppler negative for DVT  -Right lower leg films severe degenerative changes at the knee    Abnormal chest x-ray  -Radiology recommended CT with contrast   -CT scan no suspicious right hilar pulmonary mass. Mild right basilar atelectasis.     Bilateral renal lesions seen on above CT imaging  -CT of the abdomen pelvis was done which was suggestive of hypoenhancing mass at the pancreatic head and there is a exophytic component or node adjacently suggestive of neuroendocrine tumor.  Recommendation is endoscopic ultrasound with FNA.  GI consult will be obtained.  -Monitor renal function with CKD (creatinine stable)    DM2 with neuropathy  -Has been noncompliant as an outpatient  -A1c 11.80% last month  -Adjusted long-acting and mealtime insulin and glucoses improved today.   -Continue correctional insulin   -No more adjustments today monitor overnight adjust further if needed    Hypertension  -Continue lisinopril and blood  pressure is in acceptable range.  Avoid amlodipine due to lower extremity edema and beta-blockers due to borderline low heart rate.    Hyperlipidemia  -Statin    Hypothyroidism  -Levothyroxine    CKD 3A  -Continue to monitor (received contrast yesterday and will get today)    SCDs for DVT prophylaxis    Discussed with patient and nursing staff    Discharge: in next 2-3 days    Copied text on this note has been reviewed by me on 7/26/2023    Kelechi Sanchez MD  [facility]  07/26/23

## 2023-07-27 LAB
ANION GAP SERPL CALCULATED.3IONS-SCNC: 12.9 MMOL/L (ref 5–15)
BASOPHILS # BLD AUTO: 0.1 10*3/MM3 (ref 0–0.2)
BASOPHILS NFR BLD AUTO: 1.1 % (ref 0–1.5)
BUN SERPL-MCNC: 41 MG/DL (ref 8–23)
BUN/CREAT SERPL: 30.6 (ref 7–25)
CALCIUM SPEC-SCNC: 8.6 MG/DL (ref 8.6–10.5)
CEA SERPL-MCNC: 3.09 NG/ML
CHLORIDE SERPL-SCNC: 105 MMOL/L (ref 98–107)
CO2 SERPL-SCNC: 22.1 MMOL/L (ref 22–29)
CREAT SERPL-MCNC: 1.34 MG/DL (ref 0.57–1)
DEPRECATED RDW RBC AUTO: 43.3 FL (ref 37–54)
EGFRCR SERPLBLD CKD-EPI 2021: 39.4 ML/MIN/1.73
EOSINOPHIL # BLD AUTO: 0.33 10*3/MM3 (ref 0–0.4)
EOSINOPHIL NFR BLD AUTO: 3.5 % (ref 0.3–6.2)
ERYTHROCYTE [DISTWIDTH] IN BLOOD BY AUTOMATED COUNT: 14.1 % (ref 12.3–15.4)
FERRITIN SERPL-MCNC: 32.2 NG/ML (ref 13–150)
FOLATE SERPL-MCNC: 10.9 NG/ML (ref 4.78–24.2)
GLUCOSE BLDC GLUCOMTR-MCNC: 113 MG/DL (ref 70–130)
GLUCOSE BLDC GLUCOMTR-MCNC: 202 MG/DL (ref 70–130)
GLUCOSE BLDC GLUCOMTR-MCNC: 210 MG/DL (ref 70–130)
GLUCOSE BLDC GLUCOMTR-MCNC: 251 MG/DL (ref 70–130)
GLUCOSE BLDC GLUCOMTR-MCNC: 68 MG/DL (ref 70–130)
GLUCOSE SERPL-MCNC: 190 MG/DL (ref 65–99)
HCT VFR BLD AUTO: 35.2 % (ref 34–46.6)
HGB BLD-MCNC: 11.3 G/DL (ref 12–15.9)
IMM GRANULOCYTES # BLD AUTO: 0.04 10*3/MM3 (ref 0–0.05)
IMM GRANULOCYTES NFR BLD AUTO: 0.4 % (ref 0–0.5)
IRON 24H UR-MRATE: 40 MCG/DL (ref 37–145)
IRON SATN MFR SERPL: 11 % (ref 20–50)
LYMPHOCYTES # BLD AUTO: 1.93 10*3/MM3 (ref 0.7–3.1)
LYMPHOCYTES NFR BLD AUTO: 20.6 % (ref 19.6–45.3)
MCH RBC QN AUTO: 27.1 PG (ref 26.6–33)
MCHC RBC AUTO-ENTMCNC: 32.1 G/DL (ref 31.5–35.7)
MCV RBC AUTO: 84.4 FL (ref 79–97)
MONOCYTES # BLD AUTO: 0.79 10*3/MM3 (ref 0.1–0.9)
MONOCYTES NFR BLD AUTO: 8.4 % (ref 5–12)
NEUTROPHILS NFR BLD AUTO: 6.16 10*3/MM3 (ref 1.7–7)
NEUTROPHILS NFR BLD AUTO: 66 % (ref 42.7–76)
NRBC BLD AUTO-RTO: 0 /100 WBC (ref 0–0.2)
PLATELET # BLD AUTO: 280 10*3/MM3 (ref 140–450)
PMV BLD AUTO: 9.6 FL (ref 6–12)
POTASSIUM SERPL-SCNC: 4 MMOL/L (ref 3.5–5.2)
RBC # BLD AUTO: 4.17 10*6/MM3 (ref 3.77–5.28)
SODIUM SERPL-SCNC: 140 MMOL/L (ref 136–145)
TIBC SERPL-MCNC: 364 MCG/DL (ref 298–536)
TRANSFERRIN SERPL-MCNC: 244 MG/DL (ref 200–360)
VIT B12 BLD-MCNC: 572 PG/ML (ref 211–946)
WBC NRBC COR # BLD: 9.35 10*3/MM3 (ref 3.4–10.8)

## 2023-07-27 PROCEDURE — 63710000001 INSULIN LISPRO (HUMAN) PER 5 UNITS: Performed by: HOSPITALIST

## 2023-07-27 PROCEDURE — 99222 1ST HOSP IP/OBS MODERATE 55: CPT | Performed by: NURSE PRACTITIONER

## 2023-07-27 PROCEDURE — 82728 ASSAY OF FERRITIN: CPT | Performed by: INTERNAL MEDICINE

## 2023-07-27 PROCEDURE — 84307 ASSAY OF SOMATOSTATIN: CPT | Performed by: INTERNAL MEDICINE

## 2023-07-27 PROCEDURE — 82378 CARCINOEMBRYONIC ANTIGEN: CPT | Performed by: NURSE PRACTITIONER

## 2023-07-27 PROCEDURE — 80048 BASIC METABOLIC PNL TOTAL CA: CPT | Performed by: HOSPITALIST

## 2023-07-27 PROCEDURE — 83540 ASSAY OF IRON: CPT | Performed by: INTERNAL MEDICINE

## 2023-07-27 PROCEDURE — 82943 ASSAY OF GLUCAGON: CPT | Performed by: INTERNAL MEDICINE

## 2023-07-27 PROCEDURE — 82941 ASSAY OF GASTRIN: CPT | Performed by: INTERNAL MEDICINE

## 2023-07-27 PROCEDURE — 82948 REAGENT STRIP/BLOOD GLUCOSE: CPT

## 2023-07-27 PROCEDURE — 97110 THERAPEUTIC EXERCISES: CPT

## 2023-07-27 PROCEDURE — 84586 ASSAY OF VIP: CPT | Performed by: INTERNAL MEDICINE

## 2023-07-27 PROCEDURE — 85025 COMPLETE CBC W/AUTO DIFF WBC: CPT | Performed by: INTERNAL MEDICINE

## 2023-07-27 PROCEDURE — 63710000001 INSULIN LISPRO (HUMAN) PER 5 UNITS: Performed by: NURSE PRACTITIONER

## 2023-07-27 PROCEDURE — 99223 1ST HOSP IP/OBS HIGH 75: CPT | Performed by: INTERNAL MEDICINE

## 2023-07-27 PROCEDURE — 82607 VITAMIN B-12: CPT | Performed by: INTERNAL MEDICINE

## 2023-07-27 PROCEDURE — 82746 ASSAY OF FOLIC ACID SERUM: CPT | Performed by: INTERNAL MEDICINE

## 2023-07-27 PROCEDURE — 63710000001 INSULIN GLARGINE PER 5 UNITS: Performed by: INTERNAL MEDICINE

## 2023-07-27 PROCEDURE — 29581 APPL MULTLAYER CMPRN SYS LEG: CPT

## 2023-07-27 PROCEDURE — 84466 ASSAY OF TRANSFERRIN: CPT | Performed by: INTERNAL MEDICINE

## 2023-07-27 RX ADMIN — HYDROCODONE BITARTRATE AND ACETAMINOPHEN 1 TABLET: 5; 325 TABLET ORAL at 02:53

## 2023-07-27 RX ADMIN — BISOPROLOL FUMARATE 5 MG: 5 TABLET, FILM COATED ORAL at 10:02

## 2023-07-27 RX ADMIN — CHLORTHALIDONE 25 MG: 25 TABLET ORAL at 10:02

## 2023-07-27 RX ADMIN — Medication 10 ML: at 10:04

## 2023-07-27 RX ADMIN — INSULIN LISPRO 5 UNITS: 100 INJECTION, SOLUTION INTRAVENOUS; SUBCUTANEOUS at 20:20

## 2023-07-27 RX ADMIN — HYDROCODONE BITARTRATE AND ACETAMINOPHEN 1 TABLET: 5; 325 TABLET ORAL at 16:31

## 2023-07-27 RX ADMIN — LISINOPRIL 40 MG: 40 TABLET ORAL at 10:03

## 2023-07-27 RX ADMIN — INSULIN LISPRO 5 UNITS: 100 INJECTION, SOLUTION INTRAVENOUS; SUBCUTANEOUS at 10:02

## 2023-07-27 RX ADMIN — PREGABALIN 50 MG: 50 CAPSULE ORAL at 10:03

## 2023-07-27 RX ADMIN — PREGABALIN 50 MG: 50 CAPSULE ORAL at 20:18

## 2023-07-27 RX ADMIN — PREGABALIN 50 MG: 50 CAPSULE ORAL at 16:31

## 2023-07-27 RX ADMIN — PANTOPRAZOLE SODIUM 40 MG: 40 TABLET, DELAYED RELEASE ORAL at 06:23

## 2023-07-27 RX ADMIN — LEVOTHYROXINE SODIUM 112 MCG: 0.11 TABLET ORAL at 06:24

## 2023-07-27 RX ADMIN — INSULIN LISPRO 18 UNITS: 100 INJECTION, SOLUTION INTRAVENOUS; SUBCUTANEOUS at 13:57

## 2023-07-27 RX ADMIN — Medication 5 MG: at 22:21

## 2023-07-27 RX ADMIN — INSULIN LISPRO 5 UNITS: 100 INJECTION, SOLUTION INTRAVENOUS; SUBCUTANEOUS at 13:57

## 2023-07-27 RX ADMIN — INSULIN GLARGINE 65 UNITS: 100 INJECTION, SOLUTION SUBCUTANEOUS at 20:19

## 2023-07-27 RX ADMIN — Medication 1000 MCG: at 10:03

## 2023-07-27 RX ADMIN — ATORVASTATIN CALCIUM 80 MG: 20 TABLET, FILM COATED ORAL at 20:18

## 2023-07-27 RX ADMIN — AMLODIPINE BESYLATE 5 MG: 5 TABLET ORAL at 10:03

## 2023-07-27 RX ADMIN — DULOXETINE HYDROCHLORIDE 30 MG: 30 CAPSULE, DELAYED RELEASE ORAL at 10:03

## 2023-07-27 RX ADMIN — Medication 10 ML: at 20:18

## 2023-07-27 RX ADMIN — HYDROCODONE BITARTRATE AND ACETAMINOPHEN 1 TABLET: 5; 325 TABLET ORAL at 22:21

## 2023-07-27 RX ADMIN — ZINC OXIDE 1 APPLICATION: 200 OINTMENT TOPICAL at 20:19

## 2023-07-27 RX ADMIN — INSULIN LISPRO 18 UNITS: 100 INJECTION, SOLUTION INTRAVENOUS; SUBCUTANEOUS at 10:01

## 2023-07-27 RX ADMIN — TERAZOSIN 1 MG: 1 CAPSULE ORAL at 20:19

## 2023-07-27 RX ADMIN — HYDROCODONE BITARTRATE AND ACETAMINOPHEN 1 TABLET: 5; 325 TABLET ORAL at 10:48

## 2023-07-27 RX ADMIN — ZINC OXIDE 1 APPLICATION: 200 OINTMENT TOPICAL at 10:06

## 2023-07-27 NOTE — CONSULTS
Subjective     REASON FOR CONSULTATION:  pancreatic mass  Provide an opinion on any further workup or treatment                             REQUESTING PHYSICIAN:  Daniel    RECORDS OBTAINED:  Records of the patients history including those obtained from the referring provider were reviewed and summarized in detail.      History of Present Illness   This is a pleasant 83-year-old woman with type 2 diabetes, hypothyroidism, hypertension, stage IIIb CKD, anxiety disorder initially admitted on 7/21/2023 for generalized weakness and difficulty ambulating about her house.  She had a chest x-ray performed on 7/21/2023 which showed possible soft tissue in the right hilum and a follow-up CT chest was performed 7/24/2023 showing numerous mediastinal and right hilar lymph nodes predominantly calcified favored to represent prior granulomatous disease.  Visualized upper abdomen showed pneumobilia, calcified splenic granulomas, bilateral renal cortical lesions, intermediate density lesion upper pole of the right kidney 2 cm, small hyperdense lesion midpole of the kidney for which a CT pre and postcontrast renal protocol was recommended.  A CT abdomen/pelvis with and without contrast was performed on 7/26/23 which showed a slightly hyperdense slightly lobular partially exophytic nodule upper pole the right kidney with no postcontrast enhancement and a subcentimeter hyperdense nodule immediately stable in size.  There were no enhancing renal lesions.  Urinary bladder was thickened but nearly completely collapsed.  In the pancreatic head there was mild enhancement of a suspected mass 3.6 cm in diameter narrowing the common bile duct and a 2.8 x 2.2 cm lymph node or exophytic component inferiorly with mild dilation of the pancreatic duct.  The mass was suspicious for a neuroendocrine tumor radiographically.    The patient reports chronic diarrhea over the previous year or so but no weight loss or abdominal pain.  Blood sugars have  been running elevated.  She denies facial flushing or hypoglycemic events.      Past Medical History:   Diagnosis Date    Acute metabolic encephalopathy 6/24/2022    Anxiety     Arthritis     Benign essential hypertension 08/20/2014    Bleeding disorder     Depression     Diabetes     Diabetes mellitus, type 2     Disc degeneration, lumbar     Headache, tension-type     Hyperlipidemia     Hypothyroidism     Neuropathy     Osteoporosis 09/09/2015    Peripheral neuropathy     Rotator cuff tear, left     Scoliosis     Shoulder pain     LEFT, TORN ROTATOR CUFF S/P FALL    Spinal stenosis         Past Surgical History:   Procedure Laterality Date    APPENDECTOMY      BILATERAL BREAST REDUCTION Bilateral 08/2015    CATARACT EXTRACTION  03/2015    CHOLECYSTECTOMY WITH INTRAOPERATIVE CHOLANGIOGRAM N/A 3/27/2022    Procedure: CHOLECYSTECTOMY LAPAROSCOPIC INTRAOPERATIVE CHOLANGIOGRAM;  Surgeon: Aiyana Hill MD;  Location: [facility] MAIN OR;  Service: General;  Laterality: N/A;    COLONOSCOPY  06/05/2015    WNL    ERCP N/A 2/25/2022    Procedure: ENDOSCOPIC RETROGRADE CHOLANGIOPANCREATOGRAPHY with sphincterotomy and balloon sweep;  Surgeon: Chilo Wilhelm MD;  Location: [facility] ENDOSCOPY;  Service: Gastroenterology;  Laterality: N/A;  PRE/POST - CBD stones    ERCP N/A 3/28/2022    Procedure: ENDOSCOPIC RETROGRADE CHOLANGIOPANCREATOGRAPHY WITH SPHINCTEROTOMY AND BALLOON SWEEP;  Surgeon: Chilo Wilhelm MD;  Location: [facility] ENDOSCOPY;  Service: Gastroenterology;  Laterality: N/A;  PRE: COMMON DUCT STONE  POST: COMMON DUCT STONE    KYPHOPLASTY      REDUCTION MAMMAPLASTY      TONSILLECTOMY          No current facility-administered medications on file prior to encounter.     Current Outpatient Medications on File Prior to Encounter   Medication Sig Dispense Refill    amLODIPine (NORVASC) 5 MG tablet Take 1 tablet by mouth Daily. 90 tablet 1    amoxicillin-clavulanate (AUGMENTIN) 875-125 MG per tablet Take 1 tablet by  mouth Every 12 (Twelve) Hours. 20 tablet 0    atorvastatin (LIPITOR) 80 MG tablet TAKE 1 TABLET EVERY NIGHT 30 tablet 0    BD Pen Needle Naila 2nd Gen 32G X 4 MM misc USE TO INJECT INSULIN FOUR TIMES DAILY 200 each 0    bisoprolol (ZEBeta) 5 MG tablet Take 1 tablet by mouth Daily. 90 tablet 1    chlorthalidone (HYGROTON) 25 MG tablet Take 1 tablet by mouth Daily. 90 tablet 1    doxazosin (CARDURA) 1 MG tablet       Dulaglutide (Trulicity) 1.5 MG/0.5ML solution pen-injector Inject 1.5 mg under the skin into the appropriate area as directed 1 (One) Time Per Week. sunday 6 mL 3    DULoxetine (CYMBALTA) 30 MG capsule Take 1 capsule by mouth Daily. 90 capsule 1    HYDROcodone-acetaminophen (NORCO) 5-325 MG per tablet Take 1 tablet by mouth Every 6 (Six) Hours As Needed for Moderate Pain for up to 12 doses. 12 tablet 0    hydrocortisone-zinc oxide-bacitracin-nystatin cream Apply 1 application topically to the appropriate area as directed 2 (Two) Times a Day As Needed (rash). 5 g 0    Insulin Glargine, 1 Unit Dial, (TOUJEO) 300 UNIT/ML solution pen-injector injection Inject 65 Units under the skin into the appropriate area as directed Daily.      insulin lispro (ADMELOG) 100 UNIT/ML injection Inject 8 Units under the skin into the appropriate area as directed 3 (Three) Times a Day With Meals.  12    lansoprazole (PREVACID) 15 MG capsule Take 1 capsule by mouth Daily.      levothyroxine (SYNTHROID, LEVOTHROID) 112 MCG tablet levothyroxine 112 mcg 1 tablet daily, except on Sunday take 1.5 tablets. 94 tablet 1    lisinopril (PRINIVIL,ZESTRIL) 40 MG tablet Take 1 tablet by mouth Daily. 90 tablet 1    pregabalin (LYRICA) 50 MG capsule Take 1 capsule by mouth 3 (Three) Times a Day. 270 capsule 1    vitamin B-12 (VITAMIN B-12) 1000 MCG tablet Take 1 tablet by mouth Daily.          ALLERGIES:    Allergies   Allergen Reactions    Sulfa Antibiotics Unknown - High Severity     Pt says it was a long time ago and I don't remember but it  wasn't good.         Social History     Socioeconomic History    Marital status:    Tobacco Use    Smoking status: Former     Packs/day: 1.00     Types: Cigarettes     Quit date: 2013     Years since quitting: 10.5    Smokeless tobacco: Never   Vaping Use    Vaping Use: Never used   Substance and Sexual Activity    Alcohol use: No    Drug use: No    Sexual activity: Defer        Family History   Problem Relation Age of Onset    Bone cancer Mother     No Known Problems Father         Review of Systems   Constitutional:  Positive for activity change and fatigue. Negative for diaphoresis, fever and unexpected weight change.   HENT: Negative.     Respiratory: Negative.     Cardiovascular:  Positive for leg swelling.   Gastrointestinal:  Positive for diarrhea. Negative for abdominal pain, constipation, nausea and vomiting.   Musculoskeletal:  Positive for arthralgias and gait problem.   Skin:  Positive for wound.   Neurological:  Positive for weakness. Negative for dizziness, seizures and light-headedness.   Hematological:  Negative for adenopathy. Does not bruise/bleed easily.   Psychiatric/Behavioral:  Negative for dysphoric mood. The patient is nervous/anxious.         Objective     Vitals:    07/26/23 0957 07/26/23 1756 07/26/23 2111 07/27/23 0606   BP: 159/64 139/67 126/59 133/59   BP Location: Right arm Right arm Right arm Right arm   Patient Position: Lying Sitting Lying Lying   Pulse: 58 60 57 56   Resp: 18 16 18 18   Temp: 97.7 øF (36.5 øC) 98.5 øF (36.9 øC) 97.3 øF (36.3 øC) 97.1 øF (36.2 øC)   TempSrc: Oral Oral Oral Oral   SpO2: 94% 95% 94% 96%   Weight:    94.7 kg (208 lb 12.4 oz)   Height:              No data to display                Physical Exam    CONSTITUTIONAL: pleasant well-developed adult woman  HEENT: no icterus, no thrush, moist membranes  LYMPH: no cervical or supraclavicular lad  CV: RRR, S1S2, no murmur  RESP: cta bilat, no wheezing, no rales  GI: soft, non-tender, no splenomegaly,  +bs  MUSC: LE wrapped bilat  NEURO: alert and oriented x3   PSYCH: normal mood and affect    RECENT LABS:  Hematology WBC   Date Value Ref Range Status   07/27/2023 9.35 3.40 - 10.80 10*3/mm3 Final     RBC   Date Value Ref Range Status   07/27/2023 4.17 3.77 - 5.28 10*6/mm3 Final     Hemoglobin   Date Value Ref Range Status   07/27/2023 11.3 (L) 12.0 - 15.9 g/dL Final     Hematocrit   Date Value Ref Range Status   07/27/2023 35.2 34.0 - 46.6 % Final     Platelets   Date Value Ref Range Status   07/27/2023 280 140 - 450 10*3/mm3 Final        Lab Results   Component Value Date    GLUCOSE 190 (H) 07/27/2023    BUN 41 (H) 07/27/2023    CREATININE 1.34 (H) 07/27/2023    EGFRRESULT 42.3 (L) 06/02/2023    EGFR 39.4 (L) 07/27/2023    BCR 30.6 (H) 07/27/2023    K 4.0 07/27/2023    CO2 22.1 07/27/2023    CALCIUM 8.6 07/27/2023    PROTENTOTREF 7.2 06/02/2023    ALBUMIN 3.9 07/21/2023    BILITOT 0.4 07/21/2023    AST 18 07/21/2023    ALT 10 07/21/2023       CT abdomen/pelvis 7/26/2023:  IMPRESSION:  1. There is a hyperenhancing mass at the pancreatic head measuring  approximately 3.6 cm and there is a 2.8 cm exophytic component or node  adjacently. The appearance is suspicious for a neuroendocrine tumor. The  mass significantly narrows the downstream portion of the common bile  duct, but there is no intrahepatic biliary dilatation and there is  pneumobilia. There is mild dilatation of the main pancreatic duct. For  tissue diagnosis, endoscopic ultrasound with FNA is recommended.  2. There are a few slightly hyperdense right renal nodules which likely  represent proteinaceous cysts. There are no enhancing or suspicious  renal lesions.  I personally viewed the CT abdomen-there is a 3.5 cm mass in the head of the pancreas and adjacent 2.8 cm lesion no obvious liver metastases    Assessment & Plan     *Hyperenhancing pancreatic head mass 3.6 cm with an adjacent 2.8 cm exophytic component or lymph node suspicious for NET    *Chronic  diarrhea x1 year, hyperglycemia (but diabetic)    *Normocytic, normochromic anemia-hemoglobin 11.3 likely secondary to CKD 3    *Comorbidities include venous insufficiency, hypertension, hyperlipidemia, CKD, hypothyroidism, advanced age-PS ECOG 2    Oncology plan/recommendations:  Given diarrhea and elevated A1c, will assess for a functional NET-check glucagon, gastrin, somatostatin and VIP levels  Agree with GI consult to consider endoscopic ultrasound and biopsy  Anemia [id number] iron profile B12 folate

## 2023-07-27 NOTE — PROGRESS NOTES
Continued Stay Note  [facility]     Patient Name: Halie Guidry  MRN: [id number]  Today's Date: 2023    Admit Date: 2023    Plan: [facility]so (Will need a new pre-cert)   Discharge Plan       Row Name [id number]       Plan    Plan Charli Alfonso (Will need a new pre-cert)    Plan Comments Per Mame with [facility], Pre-cert  yesterday. Will need a new pre-cert.      Row Name [id number]       Plan    Plan Charli Alfonso    Plan Comments Msg sent to Mame with [facility] to ask about how long pre-cert good for.                   Discharge Codes    No documentation.                 Expected Discharge Date and Time       Expected Discharge Date Expected Discharge Time    2023               Vannessa Mcdowell RN

## 2023-07-27 NOTE — PLAN OF CARE
"Goal Outcome Evaluation:  Plan of Care Reviewed With: patient           Outcome Evaluation: Pt agreed to PT session, tolerated amb ~25ft today w/close follow w/recliner , pt required assist of 2 for scooting to EOB and STS, pt fatigues quickly and c/o LE wkness, RLE pain but\" unsure why\" pt stated; pt plans SNU at [place] when medical agrees, pt reports MD found something on her pancreas that she is concerned finding out about before DC      Anticipated Discharge Disposition (PT): skilled nursing facility  "

## 2023-07-27 NOTE — PROGRESS NOTES
Name: Haile Guidry ADMIT: 2023   : 1940  PCP: Napoleon Mead MD    MRN: [id number] LOS: 1 days   AGE/SEX: 83 y.o. female  ROOM: [facility]     Subjective   Subjective   No new complaints. Denies chest pain shortness of breath, palpitations, dizziness, numbness, tingling, abdominal pain.     Objective   Objective   Vital Signs  Temp:  [97.1 øF (36.2 øC)-98.5 øF (36.9 øC)] 97.3 øF (36.3 øC)  Heart Rate:  [56-61] 61  Resp:  [16-18] 18  BP: (126-139)/(59-72) 134/72  SpO2:  [94 %-97 %] 97 %  on   ;   Device (Oxygen Therapy): room air  Body mass index is 31.74 kg/mý.    Physical Exam  Constitutional:       General: She is not in acute distress.     Appearance: She is not toxic-appearing.   HENT:      Head: Normocephalic and atraumatic.   Eyes:      Extraocular Movements: Extraocular movements intact.   Cardiovascular:      Rate and Rhythm: Normal rate and regular rhythm.   Pulmonary:      Effort: Pulmonary effort is normal. No respiratory distress.      Breath sounds: Normal breath sounds. No wheezing or rhonchi.   Abdominal:      General: Bowel sounds are normal.      Palpations: Abdomen is soft.      Tenderness: There is no abdominal tenderness. There is no guarding or rebound.   Musculoskeletal:      Cervical back: Normal range of motion.      Comments: Lower legs wrapped   Skin:     General: Skin is warm and dry.   Neurological:      General: No focal deficit present.      Mental Status: She is alert and oriented to person, place, and time.   Psychiatric:         Mood and Affect: Mood normal.         Behavior: Behavior normal.         Thought Content: Thought content normal.     Results Review  I reviewed the patient's new clinical results.  Results from last 7 days   Lab Units 23  0530 23  0457 23  0515 23  0512   WBC 10*3/mm3 9.35 9.06 9.28 8.46   HEMOGLOBIN g/dL 11.3* 11.3* 11.8* 11.5*   PLATELETS 10*3/mm3 280 284 284 276       Results from last 7 days   Lab Units  07/27/23  0530 07/26/23  0457 07/25/23  0515 07/24/23  0512   SODIUM mmol/L 140 137 136 134*   POTASSIUM mmol/L 4.0 4.0 4.1 3.9   CHLORIDE mmol/L 105 103 103 101   CO2 mmol/L 22.1 21.6* 20.0* 20.9*   BUN mg/dL 41* 37* 36* 38*   CREATININE mg/dL 1.34* 1.18* 1.13* 1.21*   GLUCOSE mg/dL 190* 179* 112* 206*       Lab Results   Component Value Date    ANIONGAP 12.9 07/27/2023     Estimated Creatinine Clearance: 38.3 mL/min (A) (by C-G formula based on SCr of 1.34 mg/dL (H)).    Results from last 7 days   Lab Units 07/21/23  1846   ALBUMIN g/dL 3.9   BILIRUBIN mg/dL 0.4   ALK PHOS U/L 109   AST (SGOT) U/L 18   ALT (SGPT) U/L 10       Results from last 7 days   Lab Units 07/27/23  0530 07/26/23  0457 07/25/23  0515 07/24/23  0512 07/22/23  0412 07/21/23  1846   CALCIUM mg/dL 8.6 8.7 9.2 9.1   < > 8.7   ALBUMIN g/dL  --   --   --   --   --  3.9    < > = values in this interval not displayed.         Glucose   Date/Time Value Ref Range Status   07/27/2023 1553 68 (L) 70 - 130 mg/dL Final     Comment:     Meter: [id number] : [id number] Anna Farmer NA   07/27/2023 1131 210 (H) 70 - 130 mg/dL Final     Comment:     Meter: [id number] : [id number] Anna Campalyn NA   07/27/2023 0612 202 (H) 70 - 130 mg/dL Final     Comment:     Meter: [id number] : [id number] Alvin Lackey NA   07/26/2023 2111 156 (H) 70 - 130 mg/dL Final     Comment:     Meter: [id number] : [id number] Jurgen Melgar CNA   07/26/2023 1705 143 (H) 70 - 130 mg/dL Final     Comment:     Meter: [id number] : [id number] [facility]   07/26/2023 1155 160 (H) 70 - 130 mg/dL Final     Comment:     Meter: [id number] : [id number] [facility]   07/26/2023 0630 185 (H) 70 - 130 mg/dL Final     Comment:     Meter: [id number] : [id number] [place] HERNANDEZ       CT Abdomen Pelvis With & Without Contrast    Result Date: 7/27/2023  1. There is a hyperenhancing mass at the pancreatic head measuring approximately 3.6 cm and there is a  2.8 cm exophytic component or node adjacently. The appearance is suspicious for a neuroendocrine tumor. The mass significantly narrows the downstream portion of the common bile duct, but there is no intrahepatic biliary dilatation and there is pneumobilia. There is mild dilatation of the main pancreatic duct. For tissue diagnosis, endoscopic ultrasound with FNA is recommended. 2. There are a few slightly hyperdense right renal nodules which likely represent proteinaceous cysts. There are no enhancing or suspicious renal lesions.  I called the patient's nurse this morning and asked her to notify the patient's physician to read this report today morning.  This report was finalized on 7/27/2023 8:31 AM by Dr. Diane Munoz M.D.       Scheduled Meds  amLODIPine, 5 mg, Oral, Daily  atorvastatin, 80 mg, Oral, Nightly  bisoprolol, 5 mg, Oral, Daily  chlorthalidone, 25 mg, Oral, Daily  DULoxetine, 30 mg, Oral, Daily  hydrocortisone-bacitracin-zinc oxide-nystatin, 1 application , Topical, BID  insulin glargine, 65 Units, Subcutaneous, Nightly  insulin lispro, 18 Units, Subcutaneous, TID With Meals  insulin lispro, 3-14 Units, Subcutaneous, 4x Daily With Meals & Nightly  levothyroxine, 112 mcg, Oral, Once per day on Mon Tue Wed Thu Fri Sat  levothyroxine, 168 mcg, Oral, Once per day on Sun  lisinopril, 40 mg, Oral, Daily  pantoprazole, 40 mg, Oral, Q AM  pregabalin, 50 mg, Oral, TID  senna-docusate sodium, 2 tablet, Oral, BID  sodium chloride, 10 mL, Intravenous, Q12H  terazosin, 1 mg, Oral, Nightly  cyanocobalamin, 1,000 mcg, Oral, Daily    Continuous Infusions   PRN Meds    senna-docusate sodium **AND** polyethylene glycol **AND** bisacodyl **AND** bisacodyl    dextrose    dextrose    glucagon (human recombinant)    HYDROcodone-acetaminophen    melatonin    sodium chloride    sodium chloride     Diet  Diet: Cardiac Diets, Diabetic Diets; Healthy Heart (2-3 Na+); Consistent Carbohydrate; Texture: Regular Texture (IDDSI  7); Fluid Consistency: Thin (IDDSI 0)    I have personally reviewed:  [x]  Medications  [x]  Laboratory   []  Microbiology   []  Radiology  []  EKG/Telemetry   []  Cardiology/Vascular   []  Pathology   []  Records      Assessment/Plan     Active Hospital Problems    Diagnosis  POA    **Bilateral leg pain [M79.604, M79.605]  Yes    Hypertensive kidney disease with stage 3a chronic kidney disease [I12.9, N18.31]  Yes    Anxiety disorder [F41.9]  Yes    Noncompliance with medication regimen [Z91.148]  Not Applicable    Hyperlipidemia [E78.5]  Yes    Primary hypothyroidism [E03.9]  Yes    Type 2 diabetes mellitus with hyperglycemia, with long-term current use of insulin [E11.65, Z79.4]  Not Applicable    Benign essential hypertension [I10]  Yes      Resolved Hospital Problems   No resolved problems to display.     83 y.o. female with history of diabetes, neuropathy, diabetic foot ulcer, hypertension, hyperlipidemia, hypothyroidism, cognitive impairment, recurrent UTIs presented with bilateral lower extremity swelling and pain since May and decreased activity due to pain. She was in the hospital in May for lower extremity cellulitis declined SNF recommendation.    Leg pain  Venous insufficiency  -Outpatient Doppler negative for DVT  -Right lower leg films severe degenerative changes at the knee    Abnormal chest x-ray  -Radiology recommended CT with contrast   -CT scan no suspicious right hilar pulmonary mass. Mild right basilar atelectasis.     Bilateral renal lesions seen on above CT imaging  -CT of the abdomen pelvis was done which was suggestive of hypoenhancing mass at the pancreatic head and there is a exophytic component or node adjacently suggestive of neuroendocrine tumor.  Recommendation is endoscopic ultrasound with FNA.  GI consult has been obtained and serology work-up has been initiated for neuroendocrine tumor and may consider colonoscopy versus sigmoidoscopy as an outpatient basis to rule out  microscopic colitis.  Arrangements are being made for endoscopic ultrasound with FNA.  Appreciate GI input.  -Monitor renal function with CKD (creatinine stable)    DM2 with neuropathy  -Has been noncompliant as an outpatient  -A1c 11.80% last month  -Adjusted long-acting and mealtime insulin and glucoses improved today.   -Continue correctional insulin   -No more adjustments today monitor overnight adjust further if needed    Hypertension  -Continue lisinopril and blood pressure is in acceptable range.  Avoid amlodipine due to lower extremity edema and beta-blockers due to borderline low heart rate.    Hyperlipidemia  -Statin    Hypothyroidism  -Levothyroxine    CKD 3A  -Continue to monitor (received contrast yesterday and will get today)    SCDs for DVT prophylaxis    Discussed with patient and nursing staff    Discharge: in next 2-3 days    Copied text on this note has been reviewed by me on 7/27/2023    Kelechi Sanchez MD  [facility]  07/27/23

## 2023-07-27 NOTE — CONSULTS
Gastroenterology   Initial Inpatient Consult Note    Referring Provider: Dr. Sanchez    Reason for Consultation: Intra-abdominal mass    Subjective     History of present illness:    83 y.o. female referred to GI for intra-abdominal mass which was noted incidentally on CT scan of the abdomen pelvis on 7/26/2023.  CT revealed hyperenhancing mass of the pancreatic head measuring approximate 3.6 cm with an adjacent 2.8 cm exophytic component or node present.  Appearance is suspicious for neuroendocrine tumor.  The mass significantly narrows the downstream portion of the CBD but there is no intrahepatic biliary dilatation.  Pneumobilia is present.  There is mild dilatation of the pancreatic duct.  EUS with FNA is recommended.  No suspicious renal lesions noted but there were a few slightly hyperdense right renal nodules noted likely representative of proteinaceous cyst.    Patient denies have any abdominal pain, nausea, or vomiting.  She denies any family history of pancreatic cancer.  She does report a chronic history of diarrhea for at least the past year.  She can have multiple bowel movements in a given day and then skip a day in between bowel movements.  Stool is always of liquid consistency.  She has taken multiple antibiotics for recurrent UTIs.  She also has a history of T2DM and has had issues with lower extremity wounds.  She has not had stool studies performed.    She reports having her last colonoscopy several years ago and has undergone several colonoscopies in the past.  She denies any personal history of colon polyps or any family history of colon cancer.  She is a former smoker and quit 10 years ago.  She does not consume alcohol.  She has history of type 2 diabetes mellitus.    Past Medical History:  Past Medical History:   Diagnosis Date    Acute metabolic encephalopathy 6/24/2022    Anxiety     Arthritis     Benign essential hypertension 08/20/2014    Bleeding disorder     Depression     Diabetes      Diabetes mellitus, type 2     Disc degeneration, lumbar     Headache, tension-type     Hyperlipidemia     Hypothyroidism     Neuropathy     Osteoporosis 09/09/2015    Peripheral neuropathy     Rotator cuff tear, left     Scoliosis     Shoulder pain     LEFT, TORN ROTATOR CUFF S/P FALL    Spinal stenosis      Past Surgical History:  Past Surgical History:   Procedure Laterality Date    APPENDECTOMY      BILATERAL BREAST REDUCTION Bilateral 08/2015    CATARACT EXTRACTION  03/2015    CHOLECYSTECTOMY WITH INTRAOPERATIVE CHOLANGIOGRAM N/A 3/27/2022    Procedure: CHOLECYSTECTOMY LAPAROSCOPIC INTRAOPERATIVE CHOLANGIOGRAM;  Surgeon: Aiyana Hill MD;  Location: [facility] OR;  Service: General;  Laterality: N/A;    COLONOSCOPY  06/05/2015    WNL    ERCP N/A 2/25/2022    Procedure: ENDOSCOPIC RETROGRADE CHOLANGIOPANCREATOGRAPHY with sphincterotomy and balloon sweep;  Surgeon: Chilo Wilhelm MD;  Location: [facility] ENDOSCOPY;  Service: Gastroenterology;  Laterality: N/A;  PRE/POST - CBD stones    ERCP N/A 3/28/2022    Procedure: ENDOSCOPIC RETROGRADE CHOLANGIOPANCREATOGRAPHY WITH SPHINCTEROTOMY AND BALLOON SWEEP;  Surgeon: Chilo Wilhelm MD;  Location: [facility] ENDOSCOPY;  Service: Gastroenterology;  Laterality: N/A;  PRE: COMMON DUCT STONE  POST: COMMON DUCT STONE    KYPHOPLASTY      REDUCTION MAMMAPLASTY      TONSILLECTOMY        Social History:   Social History     Tobacco Use    Smoking status: Former     Packs/day: 1.00     Types: Cigarettes     Quit date: 2013     Years since quitting: 10.5    Smokeless tobacco: Never   Substance Use Topics    Alcohol use: No      Family History:  Family History   Problem Relation Age of Onset    Bone cancer Mother     No Known Problems Father        Home Meds:  Medications Prior to Admission   Medication Sig Dispense Refill Last Dose    amLODIPine (NORVASC) 5 MG tablet Take 1 tablet by mouth Daily. 90 tablet 1     amoxicillin-clavulanate (AUGMENTIN) 875-125 MG per tablet  Take 1 tablet by mouth Every 12 (Twelve) Hours. 20 tablet 0     atorvastatin (LIPITOR) 80 MG tablet TAKE 1 TABLET EVERY NIGHT 30 tablet 0     BD Pen Needle Naila 2nd Gen 32G X 4 MM misc USE TO INJECT INSULIN FOUR TIMES DAILY 200 each 0     bisoprolol (ZEBeta) 5 MG tablet Take 1 tablet by mouth Daily. 90 tablet 1     chlorthalidone (HYGROTON) 25 MG tablet Take 1 tablet by mouth Daily. 90 tablet 1     doxazosin (CARDURA) 1 MG tablet        Dulaglutide (Trulicity) 1.5 MG/0.5ML solution pen-injector Inject 1.5 mg under the skin into the appropriate area as directed 1 (One) Time Per Week. sunday 6 mL 3     DULoxetine (CYMBALTA) 30 MG capsule Take 1 capsule by mouth Daily. 90 capsule 1     HYDROcodone-acetaminophen (NORCO) 5-325 MG per tablet Take 1 tablet by mouth Every 6 (Six) Hours As Needed for Moderate Pain for up to 12 doses. 12 tablet 0     hydrocortisone-zinc oxide-bacitracin-nystatin cream Apply 1 application topically to the appropriate area as directed 2 (Two) Times a Day As Needed (rash). 5 g 0     Insulin Glargine, 1 Unit Dial, (TOUJEO) 300 UNIT/ML solution pen-injector injection Inject 65 Units under the skin into the appropriate area as directed Daily.       insulin lispro (ADMELOG) 100 UNIT/ML injection Inject 8 Units under the skin into the appropriate area as directed 3 (Three) Times a Day With Meals.  12     lansoprazole (PREVACID) 15 MG capsule Take 1 capsule by mouth Daily.       levothyroxine (SYNTHROID, LEVOTHROID) 112 MCG tablet levothyroxine 112 mcg 1 tablet daily, except on Sunday take 1.5 tablets. 94 tablet 1     lisinopril (PRINIVIL,ZESTRIL) 40 MG tablet Take 1 tablet by mouth Daily. 90 tablet 1     pregabalin (LYRICA) 50 MG capsule Take 1 capsule by mouth 3 (Three) Times a Day. 270 capsule 1     vitamin B-12 (VITAMIN B-12) 1000 MCG tablet Take 1 tablet by mouth Daily.        Current Meds:   amLODIPine, 5 mg, Oral, Daily  atorvastatin, 80 mg, Oral, Nightly  bisoprolol, 5 mg, Oral,  Daily  chlorthalidone, 25 mg, Oral, Daily  DULoxetine, 30 mg, Oral, Daily  hydrocortisone-bacitracin-zinc oxide-nystatin, 1 application , Topical, BID  insulin glargine, 65 Units, Subcutaneous, Nightly  insulin lispro, 18 Units, Subcutaneous, TID With Meals  insulin lispro, 3-14 Units, Subcutaneous, 4x Daily With Meals & Nightly  levothyroxine, 112 mcg, Oral, Once per day on Mon Tue Wed Thu Fri Sat  levothyroxine, 168 mcg, Oral, Once per day on Sun  lisinopril, 40 mg, Oral, Daily  pantoprazole, 40 mg, Oral, Q AM  pregabalin, 50 mg, Oral, TID  senna-docusate sodium, 2 tablet, Oral, BID  sodium chloride, 10 mL, Intravenous, Q12H  terazosin, 1 mg, Oral, Nightly  cyanocobalamin, 1,000 mcg, Oral, Daily      Allergies:  Allergies   Allergen Reactions    Sulfa Antibiotics Unknown - High Severity     Pt says it was a long time ago and I don't remember but it wasn't good.      Review of Systems  Pertinent items are noted in HPI, all other systems reviewed and negative    Objective     Vital Signs  Temp:  [97.1 øF (36.2 øC)-98.5 øF (36.9 øC)] 97.3 øF (36.3 øC)  Heart Rate:  [56-61] 61  Resp:  [16-18] 18  BP: (126-139)/(59-72) 134/72    Physical Exam:  CONSTITUTIONAL:  today's vital signs reviewed  EARS NOSE THROAT: trachea midline and no deformity of the nares  EYES: no scleral icterus  GASTROINTESTINAL: abdomen is soft, nontender, nondistended with normal active bowel sounds, no masses are appreciated  PSYCHIATRIC: appropriate mood and affect  RESPIRATORY: normal inspiratory effort with no increased work of breathing  NEUROLOGIC: patient is awake and alert  DERMATOLOGIC: skin is warm with no cyanosis  LYMPHATIC: no periumbilical lymphadenopathy     Results Review:   I reviewed the patient's new clinical results.  I reviewed the patient's new imaging results and agree with the interpretation.  I reviewed the patient's other test results and agree with the interpretation    Results from last 7 days   Lab Units 07/27/23  1895  07/26/23  0457 07/25/23  0515   WBC 10*3/mm3 9.35 9.06 9.28   HEMOGLOBIN g/dL 11.3* 11.3* 11.8*   HEMATOCRIT % 35.2 34.9 36.2   PLATELETS 10*3/mm3 280 284 284     Results from last 7 days   Lab Units 07/27/23  0530 07/26/23  0457 07/25/23  0515 07/22/23  0412 07/21/23  1846   SODIUM mmol/L 140 137 136   < > 132*   POTASSIUM mmol/L 4.0 4.0 4.1   < > 4.3   CHLORIDE mmol/L 105 103 103   < > 100   CO2 mmol/L 22.1 21.6* 20.0*   < > 20.6*   BUN mg/dL 41* 37* 36*   < > 29*   CREATININE mg/dL 1.34* 1.18* 1.13*   < > 1.13*   CALCIUM mg/dL 8.6 8.7 9.2   < > 8.7   BILIRUBIN mg/dL  --   --   --   --  0.4   ALK PHOS U/L  --   --   --   --  109   ALT (SGPT) U/L  --   --   --   --  10   AST (SGOT) U/L  --   --   --   --  18   GLUCOSE mg/dL 190* 179* 112*   < > 336*    < > = values in this interval not displayed.         Lab Results   Lab Value Date/Time    LIPASE 50 05/26/2022 2131    LIPASE 70 (H) 03/26/2022 1018    LIPASE 33 02/23/2022 0125       Radiology:  CT Abdomen Pelvis With & Without Contrast   Final Result   1. There is a hyperenhancing mass at the pancreatic head measuring   approximately 3.6 cm and there is a 2.8 cm exophytic component or node   adjacently. The appearance is suspicious for a neuroendocrine tumor. The   mass significantly narrows the downstream portion of the common bile   duct, but there is no intrahepatic biliary dilatation and there is   pneumobilia. There is mild dilatation of the main pancreatic duct. For   tissue diagnosis, endoscopic ultrasound with FNA is recommended.   2. There are a few slightly hyperdense right renal nodules which likely   represent proteinaceous cysts. There are no enhancing or suspicious   renal lesions.       I called the patient's nurse this morning and asked her to notify the   patient's physician to read this report today morning.       This report was finalized on 7/27/2023 8:31 AM by Dr. Diane Munoz M.D.          CT Chest With Contrast Diagnostic   Final Result   1.  No suspicious right hilar or pulmonary mass. Mild right basilar   atelectasis.   2. Numerous renal cortical lesions partly imaged. There is at least 1   intermediate density lesion. At this time, follow-up with pre and   postcontrast CT or MR renal mass protocol is recommended for   characterization on a nonemergent basis.   3. Small sliding hiatal hernia.       Radiation dose reduction techniques were utilized, including automated   exposure control and exposure modulation based on body size.       This report was finalized on 7/25/2023 12:49 PM by Dr. Gennaro Henley M.D.          XR Ankle 3+ View Right   Final Result      XR Knee 1 or 2 View Right   Final Result      XR Tibia Fibula 2 View Right   Final Result      XR Chest 1 View   Final Result   There is mild right lung base atelectasis versus pneumonia.   Soft tissue fullness in the region of the right hilum is also suspected.   A follow-up CT of the chest with contrast is suggested.       This report was finalized on 7/21/2023 8:02 PM by Dr. Steven Garcia M.D.              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  Gastrin level, glucagon, somatostatin, vasoactive intestinal polypeptide already ordered.  Has already been ordered.  We will also place orders for CEA, CA 19-9, chromogranin A, insulin level, proinsulin level, and pancreatic polypeptide.  Patient will need EUS with FNA for further  evaluation of pancreatic head mass.  We will need to determine who can perform this endoscopic procedure for further evaluation.  SERGE reached out to Dr. Alireza Borrego with [facility] to try to arrange procedure for patient.  Additional recommendations pending.  For further evaluation of diarrhea, we will order stool for C. difficile and GI PCR panel.  If stool studies are negative to consider colonoscopy versus flexible sigmoidoscopy to take random biopsies to assess for microscopic colitis-which can be done on an outpatient basis  We will continue to follow.      I discussed the patients findings and my recommendations with patient.    SERGE Celestin M.D.  [facility] [place] [address]  Office: [phone]

## 2023-07-27 NOTE — PLAN OF CARE
Pt seen by OT for B LE lymphedema wraps.  Edema is reducing to slight to none.  Pt cont to report tenderness and pain B LE.  Reduce to 2 wraps per leg and will monitor for continued need of wrapping.

## 2023-07-27 NOTE — THERAPY TREATMENT NOTE
Acute Care - Occupational Therapy Lymphedema Treatment Note  [facility]     Patient Name: Halie Guidry  : 1940  MRN: [id number]  Today's Date: 2023             Admit Date: 2023       ICD-10-CM ICD-9-CM   1. Bilateral leg pain  M79.604 729.5    M79.605    2. Unable to ambulate  R26.2 719.7   3. Peripheral edema  R60.9 782.3     Patient Active Problem List   Diagnosis    Compression fracture    Lumbar degenerative disc disease    Type 2 diabetes mellitus with hyperglycemia, with long-term current use of insulin    Hyperlipidemia    Benign essential hypertension    Primary hypothyroidism    Leukocytosis    Neuropathy    Osteoporosis    Proteinuria    Tobacco abuse    Diabetic eye exam    Generalized weakness    Spinal stenosis    Scoliosis    Arthritis    VBI (vertebrobasilar insufficiency)    Orthostatic hypotension    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    Severe hypothyroidism    Noncompliance with medication regimen    Vitamin D deficiency disease    Altered mental status    Tremor    Seizure    Stage 3a chronic kidney disease    Thoracic degenerative disc disease    Metabolic encephalopathy    VIPUL (acute kidney injury)    Hyperglycemia    Type II diabetes mellitus, uncontrolled    Lower abdominal pain    Transaminitis    COVID-19 virus detected    UTI (urinary tract infection)    Emphysematous cystitis    Choledocholithiasis    Hypokalemia    Hypoxia    Generalized abdominal pain    History of Clostridium difficile infection    History of ERCP    Acute UTI (urinary tract infection)    Hypomagnesemia    Burning pain    Anxiety disorder    Neuropathic pain    Intertrigo    Weakness of both lower extremities    Accelerated hypertension    Acute cystitis without hematuria    Altered mental status, unspecified altered mental status type    Left lower lobe pneumonia    Acute pain of left foot    Cellulitis and abscess of left lower extremity    Hypertensive kidney disease  with stage 3a chronic kidney disease    Bilateral leg pain     Past Medical History:   Diagnosis Date    Acute metabolic encephalopathy 6/24/2022    Anxiety     Arthritis     Benign essential hypertension 08/20/2014    Bleeding disorder     Depression     Diabetes     Diabetes mellitus, type 2     Disc degeneration, lumbar     Headache, tension-type     Hyperlipidemia     Hypothyroidism     Neuropathy     Osteoporosis 09/09/2015    Peripheral neuropathy     Rotator cuff tear, left     Scoliosis     Shoulder pain     LEFT, TORN ROTATOR CUFF S/P FALL    Spinal stenosis      Past Surgical History:   Procedure Laterality Date    APPENDECTOMY      BILATERAL BREAST REDUCTION Bilateral 08/2015    CATARACT EXTRACTION  03/2015    CHOLECYSTECTOMY WITH INTRAOPERATIVE CHOLANGIOGRAM N/A 3/27/2022    Procedure: CHOLECYSTECTOMY LAPAROSCOPIC INTRAOPERATIVE CHOLANGIOGRAM;  Surgeon: Aiyana Hill MD;  Location: [facility] MAIN OR;  Service: General;  Laterality: N/A;    COLONOSCOPY  06/05/2015    WNL    ERCP N/A 2/25/2022    Procedure: ENDOSCOPIC RETROGRADE CHOLANGIOPANCREATOGRAPHY with sphincterotomy and balloon sweep;  Surgeon: Chilo Wilhelm MD;  Location: [facility] ENDOSCOPY;  Service: Gastroenterology;  Laterality: N/A;  PRE/POST - CBD stones    ERCP N/A 3/28/2022    Procedure: ENDOSCOPIC RETROGRADE CHOLANGIOPANCREATOGRAPHY WITH SPHINCTEROTOMY AND BALLOON SWEEP;  Surgeon: Chilo Wilhelm MD;  Location: [facility] ENDOSCOPY;  Service: Gastroenterology;  Laterality: N/A;  PRE: COMMON DUCT STONE  POST: COMMON DUCT STONE    KYPHOPLASTY      REDUCTION MAMMAPLASTY      TONSILLECTOMY          Lymphedema       Row Name 07/27/23 1545 07/26/23 1200 07/25/23 1500       Subjective Pain    Able to rate subjective pain? -- yes  -KP yes  -KP    Pre-Treatment Pain Level -- 3  -KP 0  -KP    Post-Treatment Pain Level -- 3  -KP 0  -KP    Subjective Pain Comment -- B LE  -KP --    Recorded by  [KP] Christina Redmond, OTR [KP] Nyla  GREGG Randhawa       Subjective Comments    Subjective Comments agrees to unwrap and rewrap legs.  -LE pt agrees to wraps and reports legs look better.  -KP --    Recorded by [LE] Gwendolyn Cheney OTR [KP] Christina Redmond OTR        Vital Signs    O2 Delivery Pre Treatment room air  -LE -- --    Recorded by [LE] Gwendolyn Cheney OTR         Lymphedema Assessment    Lymphedema Assessment Comments reduction in edema to slight to no edema.  today used only 2 wraps per leg.  -LE -- --    Recorded by [LE] Gwendolyn Cheney OTR         Lymphedema Edema Assessment    Pitting Edema -- Mild  -KP Mild  -KP    Edema Assessment Comment -- pt LE decreased in swelling, still red and tender.  - pt LEs have decreased today in edema and pt reports they look a lot better.  -KP    Recorded by  [KP] Christina Redmond OTR [KP] Christina Redmond OTR       Skin Changes/Observations    Location/Assessment -- Lower Extremity  -KP Lower Extremity  -KP    Lower Extremity Conditions hairless;shiny;dry;clean;intact  -LE bilateral:;clean;shiny;dry;hairless  -KP bilateral:;clean;shiny;dry;hairless  -KP    Lower Extremity Color/Pigment red;bilateral:  mild redness.  -LE -- --    Skin Observations Comment bandage per nsg to below big toe  -LE bandage to L toe  -KP B skin intact. bandage to L big toe.  -KP    Recorded by [LE] Gwendolyn Cheney OTR [KP] Christina Redmond OTR [KP] Christina Redmond OTR       Compression/Skin Care    Compression/Skin Care wrapping location;skin care;compression garment;remove bandages;bandaging  -LE skin care;wrapping location;bandaging;compression garment;remove bandages  -KP skin care;wrapping location;bandaging;compression garment;remove bandages  -KP    Skin Care washed/dried  -LE washed/dried;moisturizing lotion applied  -KP washed/dried;moisturizing lotion applied  -KP    Wrapping Location lower extremity  -LE lower extremity  -KP lower extremity  -KP    Wrapping Location LE  bilateral:;foot to knee  L foot began wraps at forefoot to allow access to bandage  -LE bilateral:;foot to knee  -KP bilateral:;foot to knee  -KP    Wrapping Comments unwrapped then rewrap  -LE unwrapped LEs, washed , lotion applied and re wrapped  -KP unwrapped and washed legs and applied lotion. re wrapped. legs looking better per pt report  -KP    Bandage Layers padding/fluff layer;cotton liner  -LE padding/fluff layer  -KP padding/fluff layer  -KP    Bandaging Comments B foot to knee  -LE B LE foot to knee  -KP B LE wrapped foot to knee. TG9 stockinette, artiflex 10cm and partial of a 15 cm. Rosidal 8cm and 2 10 cm per LE.  -KP    Bandaging Technique light compression  -LE circumferential/spiral;light compression;moderate compression  -KP moderate compression  -KP    Compression Garment Comments -- pt reports legs are looking better, are still painful shawn to the touch and when washing her legs. pt wrapped w TG9 stockinette, 10cm artiflex, and partial of a 15 cm artiflex, 8cm rosidal per LE and two 10cm rosidal per LE.  -KP --    Remove Bandages removed to wash and assess skin and edema  -LE -- --    Compression/Skin Care Comments reduced from 3 to 2 comprilan due to reduction in edema  -LE -- --    Recorded by [LE] Gwendolyn Cheney, OTLISHA [] Christina Redmond, OTR [] Christina Redmond, TOMÁSR              User Key  (r) = Recorded By, (t) = Taken By, (c) = Cosigned By      Initials Name Effective Dates    LE Gwendolyn Cheney, OTR 06/16/21 -     KP Christina Redmond, OTR 07/11/23 -                     OT ASSESSMENT FLOWSHEET (last 12 hours)       OT Evaluation and Treatment       Row Name 07/27/23 0082                   OT Time and Intention    Subjective Information --  c/o B LE still hurting,  reports almost took wraps off last night  -LE           General Information    Patient Profile Reviewed yes  noted now plan biopsy pancreas due to incidental finding on imaging.  discuss pt with OP Lymph therapist  and OK to continue wrapping.  -LE        Existing Precautions/Restrictions --  -LE           Pain Assessment    Pretreatment Pain Rating 0/10 - no pain  -LE        Posttreatment Pain Rating 4/10  wheh upright on feet.  -LE        Pain Location - Side/Orientation Bilateral  -LE        Pain Location lower  -LE        Pain Location - extremity  -LE        Pain Intervention(s) Repositioned  pt reports wraps are  not making pain better or worse  -LE           Cognition    Orientation Status (Cognition) oriented x 4  -LE           Bed Mobility    Comment, (Bed Mobility) UIC  -LE           Wound 07/21/23 2259 lateral foot Diabetic Ulcer    Wound - Properties Group Placement Date: 07/21/23 -KG Placement Time: 2259  -KG Present on Hospital Admission: Y  -KG Orientation: lateral  -KG Location: foot  -KG Primary Wound Type: Diabetic ulc  -KG    Retired Wound - Properties Group Placement Date: 07/21/23  -KG Placement Time: 2259  -KG Present on Hospital Admission: Y  -KG Orientation: lateral  -KG Location: foot  -KG Primary Wound Type: Diabetic ulc  -KG    Retired Wound - Properties Group Date first assessed: 07/21/23  -KG Time first assessed: 2259  -KG Present on Hospital Admission: Y  -KG Location: foot  -KG Primary Wound Type: Diabetic ulc  -KG       Positioning and Restraints    Pre-Treatment Position sitting in chair/recliner  -LE        Post Treatment Position chair  -LE        In Chair reclined;call light within reach;encouraged to call for assist;exit alarm on  with aid  -LE                  User Key  (r) = Recorded By, (t) = Taken By, (c) = Cosigned By      Initials Name Effective Dates    Gwendolyn Multani OTR 06/16/21 -     Brigid Lau RN 05/16/23 -                      Occupational Therapy Education       Title: PT OT SLP Therapies (In Progress)       Topic: Occupational Therapy (In Progress)       Point: ADL training (Done)       Description:   Instruct learner(s) on proper safety adaptation and remediation  techniques during self care or transfers.   Instruct in proper use of assistive devices.                  Learning Progress Summary             Patient Acceptance, E, VU by  at 7/22/2023 1347    Comment: role of OT, d/c rec                         Point: Home exercise program (Not Started)       Description:   Instruct learner(s) on appropriate technique for monitoring, assisting and/or progressing therapeutic exercises/activities.                  Learner Progress:  Not documented in this visit.              Point: Precautions (Done)       Description:   Instruct learner(s) on prescribed precautions during self-care and functional transfers.                  Learning Progress Summary             Patient Acceptance, E, VU by  at 7/22/2023 1347    Comment: role of OT, d/c rec                         Point: Body mechanics (Done)       Description:   Instruct learner(s) on proper positioning and spine alignment during self-care, functional mobility activities and/or exercises.                  Learning Progress Summary             Patient Acceptance, E, VU by  at 7/22/2023 1347    Comment: role of OT, d/c rec                                         User Key       Initials Effective Dates Name Provider Type Discipline     08/20/21 -  Arcelia Valentin OT Occupational Therapist OT                      OT Recommendation and Plan     Plan of Care Review  Plan of Care Reviewed With: patient (ed pt OT will follow up tomorrow for wrapping.)  Plan of Care Reviewed With: patient (ed pt OT will follow up tomorrow for wrapping.)            Time Calculation:     Time Calculation- OT       Row Name 07/27/23 1552             Time Calculation- OT    OT Start Time 1508  -LE      OT Stop Time 1547  -LE      OT Time Calculation (min) 39 min  -LE      Total Timed Code Minutes- OT 39 minute(s)  -LE      OT Received On 07/27/23  -LE      OT - Next Appointment 07/28/23  -LE                User Key  (r) = Recorded By, (t) = Taken  By, (c) = Cosigned By      Initials Name Provider Type    Gwendolyn Multani, OTR Occupational Therapist                    Therapy Charges for Today       Code Description Service Date Service Provider Modifiers Qty    93535695675 HC OT MULTI LAYER COMP SYS BELOW KNEE BILATERAL 7/27/2023 Gwendolyn Cheney OTR  3                        GREGG Ambriz  7/27/2023

## 2023-07-27 NOTE — PLAN OF CARE
Goal Outcome Evaluation:  Plan of Care Reviewed With: patient        Progress: no change  Outcome Evaluation: VSS. Alert and oriented. Norco given for back and leg pain. Magic cream applied to skin excoriation and redness in abdominal  folds and under the breast. External catheter in place with good urine output. BSG monitored, due Insulin given. Snacks taken. For GI and Hema/Onco consult. BLE Unna boots in place. Refused SCDs. Slept well in between.

## 2023-07-27 NOTE — THERAPY TREATMENT NOTE
Patient Name: Halie Guidry  : 1940    MRN: [id number]                              Today's Date: 2023       Admit Date: 2023    Visit Dx:     ICD-10-CM ICD-9-CM   1. Bilateral leg pain  M79.604 729.5    M79.605    2. Unable to ambulate  R26.2 719.7   3. Peripheral edema  R60.9 782.3     Patient Active Problem List   Diagnosis    Compression fracture    Lumbar degenerative disc disease    Type 2 diabetes mellitus with hyperglycemia, with long-term current use of insulin    Hyperlipidemia    Benign essential hypertension    Primary hypothyroidism    Leukocytosis    Neuropathy    Osteoporosis    Proteinuria    Tobacco abuse    Diabetic eye exam    Generalized weakness    Spinal stenosis    Scoliosis    Arthritis    VBI (vertebrobasilar insufficiency)    Orthostatic hypotension    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    Severe hypothyroidism    Noncompliance with medication regimen    Vitamin D deficiency disease    Altered mental status    Tremor    Seizure    Stage 3a chronic kidney disease    Thoracic degenerative disc disease    Metabolic encephalopathy    VIPUL (acute kidney injury)    Hyperglycemia    Type II diabetes mellitus, uncontrolled    Lower abdominal pain    Transaminitis    COVID-19 virus detected    UTI (urinary tract infection)    Emphysematous cystitis    Choledocholithiasis    Hypokalemia    Hypoxia    Generalized abdominal pain    History of Clostridium difficile infection    History of ERCP    Acute UTI (urinary tract infection)    Hypomagnesemia    Burning pain    Anxiety disorder    Neuropathic pain    Intertrigo    Weakness of both lower extremities    Accelerated hypertension    Acute cystitis without hematuria    Altered mental status, unspecified altered mental status type    Left lower lobe pneumonia    Acute pain of left foot    Cellulitis and abscess of left lower extremity    Hypertensive kidney disease with stage 3a chronic kidney disease     Bilateral leg pain     Past Medical History:   Diagnosis Date    Acute metabolic encephalopathy 6/24/2022    Anxiety     Arthritis     Benign essential hypertension 08/20/2014    Bleeding disorder     Depression     Diabetes     Diabetes mellitus, type 2     Disc degeneration, lumbar     Headache, tension-type     Hyperlipidemia     Hypothyroidism     Neuropathy     Osteoporosis 09/09/2015    Peripheral neuropathy     Rotator cuff tear, left     Scoliosis     Shoulder pain     LEFT, TORN ROTATOR CUFF S/P FALL    Spinal stenosis      Past Surgical History:   Procedure Laterality Date    APPENDECTOMY      BILATERAL BREAST REDUCTION Bilateral 08/2015    CATARACT EXTRACTION  03/2015    CHOLECYSTECTOMY WITH INTRAOPERATIVE CHOLANGIOGRAM N/A 3/27/2022    Procedure: CHOLECYSTECTOMY LAPAROSCOPIC INTRAOPERATIVE CHOLANGIOGRAM;  Surgeon: Aiyana Hill MD;  Location: [facility] MAIN OR;  Service: General;  Laterality: N/A;    COLONOSCOPY  06/05/2015    WNL    ERCP N/A 2/25/2022    Procedure: ENDOSCOPIC RETROGRADE CHOLANGIOPANCREATOGRAPHY with sphincterotomy and balloon sweep;  Surgeon: Chilo Wilhelm MD;  Location: [facility] ENDOSCOPY;  Service: Gastroenterology;  Laterality: N/A;  PRE/POST - CBD stones    ERCP N/A 3/28/2022    Procedure: ENDOSCOPIC RETROGRADE CHOLANGIOPANCREATOGRAPHY WITH SPHINCTEROTOMY AND BALLOON SWEEP;  Surgeon: Chilo Wilhelm MD;  Location: [facility] ENDOSCOPY;  Service: Gastroenterology;  Laterality: N/A;  PRE: COMMON DUCT STONE  POST: COMMON DUCT STONE    KYPHOPLASTY      REDUCTION MAMMAPLASTY      TONSILLECTOMY        General Information       Row Name 07/27/23 1559          Physical Therapy Time and Intention    Document Type therapy note (daily note)  -JOSE ALBERTO     Mode of Treatment individual therapy;physical therapy  -       Row Name 07/27/23 1559          General Information    Patient Profile Reviewed yes  -     Existing Precautions/Restrictions fall  -       Row Name 07/27/23 1550           Living Environment    People in Home child(beatriz), adult  son  -       Row Name 07/27/23 1559          Cognition    Orientation Status (Cognition) oriented x 4  -       Row Name 07/27/23 1559          Safety Issues, Functional Mobility    Safety Issues Affecting Function (Mobility) insight into deficits/self-awareness;judgment;positioning of assistive device;problem-solving;safety precaution awareness;sequencing abilities  -     Impairments Affecting Function (Mobility) balance;endurance/activity tolerance;strength;postural/trunk control  -               User Key  (r) = Recorded By, (t) = Taken By, (c) = Cosigned By      Initials Name Provider Type    Beena Downs PTA Physical Therapist Assistant                   Mobility       Row Name 07/27/23 1600          Bed Mobility    Scooting/Bridging [place] (Bed Mobility) 2 person assist;moderate assist (50% patient effort)  used draw sheet assist  -     Supine-Sit [place] (Bed Mobility) moderate assist (50% patient effort)  -     Sit-Supine [place] (Bed Mobility) not tested  -     Assistive Device (Bed Mobility) bed rails;head of bed elevated;draw sheet  -       Row Name 07/27/23 1600          Sit-Stand Transfer    Sit-Stand [place] (Transfers) 2 person assist;moderate assist (50% patient effort);verbal cues;nonverbal cues (demo/gesture)  -     Assistive Device (Sit-Stand Transfers) walker, front-wheeled  -JOSE ALBERTO     Comment, (Sit-Stand Transfer) req 2 attempts due to wkness in LEs  -       Row Name 07/27/23 1600          Gait/Stairs (Locomotion)    [place] Level (Gait) 2 person assist;minimum assist (75% patient effort)  -     Assistive Device (Gait) walker, front-wheeled  -     Distance in Feet (Gait) 25ft, standing rest after ~12ft, followed w/chair for safety, req seated rest at 25ft  -     Deviations/Abnormal Patterns (Gait) antalgic;base of support, wide;tess decreased;stride length decreased  -      "Bilateral Gait Deviations forward flexed posture  -               User Key  (r) = Recorded By, (t) = Taken By, (c) = Cosigned By      Initials Name Provider Type    Beena Downs PTA Physical Therapist Assistant                   Obj/Interventions    No documentation.                  Goals/Plan    No documentation.                  Clinical Impression       Row Name 07/27/23 1605          Pain    Pretreatment Pain Rating 8/10  -JM     Posttreatment Pain Rating 8/10  -JM     Pain Location - Side/Orientation Right  -     Pain Location lower  -     Pain Location - extremity  -     Pain Intervention(s) Repositioned;Rest;Elevated  -       Row Name 07/27/23 1605          Plan of Care Review    Plan of Care Reviewed With patient  -     Outcome Evaluation Pt agreed to PT session, tolerated amb ~25ft today w/close follow w/recliner , pt required assist of 2 for scooting to EOB and STS, pt fatigues quickly and c/o LE wkness, RLE pain but\" unsure why\" pt stated; pt plans SNU at [place] when medical agrees, pt reports MD found something on her pancreas that she is concerned finding out about before DC  -       Row Name 07/27/23 1605          Therapy Assessment/Plan (PT)    Criteria for Skilled Interventions Met (PT) yes  -[facility] Name 07/27/23 1605          Positioning and Restraints    Pre-Treatment Position in bed  -     Post Treatment Position chair  -     In Chair reclined;call light within reach;encouraged to call for assist;exit alarm on;RLE elevated;LLE elevated;notified nsg  -               User Key  (r) = Recorded By, (t) = Taken By, (c) = Cosigned By      Initials Name Provider Type    Beena Downs PTA Physical Therapist Assistant                   Outcome Measures       Row Name 07/27/23 1615          How much help from another person do you currently need...    Turning from your back to your side while in flat bed without using bedrails? 3  -     Moving from lying on back to " sitting on the side of a flat bed without bedrails? 2  -JM     Moving to and from a bed to a chair (including a wheelchair)? 2  -JM     Standing up from a chair using your arms (e.g., wheelchair, bedside chair)? 2  -JM     Climbing 3-5 steps with a railing? 1  -JM     To walk in hospital room? 2  -JM     AM-PAC 6 Clicks Score (PT) 12  -JM     Highest level of mobility 4 --> Transferred to chair/commode  -               User Key  (r) = Recorded By, (t) = Taken By, (c) = Cosigned By      Initials Name Provider Type    Beena Downs PTA Physical Therapist Assistant                                 Physical Therapy Education       Title: PT OT SLP Therapies (In Progress)       Topic: Physical Therapy (Done)       Point: Mobility training (Done)       Learning Progress Summary             Patient Acceptance, E,TB,D, VU by  at 7/27/2023 1615    Acceptance, E, VU,NR by  at 7/24/2023 1445                         Point: Home exercise program (Done)       Learning Progress Summary             Patient Acceptance, E,TB,D, VU by  at 7/27/2023 1615    Acceptance, E, VU,NR by DB at 7/24/2023 1445                         Point: Body mechanics (Done)       Learning Progress Summary             Patient Acceptance, E,TB,D, VU by  at 7/27/2023 1615    Acceptance, E, VU,NR by DB at 7/24/2023 1445                         Point: Precautions (Done)       Learning Progress Summary             Patient Acceptance, E,TB,D, VU by  at 7/27/2023 1615    Acceptance, E, VU,NR by  at 7/24/2023 1445                                         User Key       Initials Effective Dates Name Provider Type Discipline     03/07/18 -  Beena Ray PTA Physical Therapist Assistant PT    DB 06/16/21 -  Dannielle Harrison PT Physical Therapist PT                  PT Recommendation and Plan     Plan of Care Reviewed With: patient  Outcome Evaluation: Pt agreed to PT session, tolerated amb ~25ft today w/close follow w/recliner , pt required  "assist of 2 for scooting to EOB and STS, pt fatigues quickly and c/o LE wkness, RLE pain but\" unsure why\" pt stated; pt plans SNU at [place] when medical agrees, pt reports MD found something on her pancreas that she is concerned finding out about before DC     Time Calculation:         PT Charges       Row Name 07/27/23 1616             Time Calculation    Start Time 1419  -      Stop Time 1443  -      Time Calculation (min) 24 min  -      PT Received On 07/27/23  -JOSE ALBERTO      PT - Next Appointment 07/28/23  -JSOE ALBERTO                User Key  (r) = Recorded By, (t) = Taken By, (c) = Cosigned By      Initials Name Provider Type    Beena Downs PTA Physical Therapist Assistant                  Therapy Charges for Today       Code Description Service Date Service Provider Modifiers Qty    97988783430 HC PT THER PROC EA 15 MIN 7/27/2023 Beena Ray PTA GP 2    14845862180 HC PT THER SUPP EA 15 MIN 7/27/2023 Beena Ray PTA GP 2            PT G-Codes  Outcome Measure Options: AM-PAC 6 Clicks Basic Mobility (PT)  AM-PAC 6 Clicks Score (PT): 12  AM-PAC 6 Clicks Score (OT): 17  PT Discharge Summary  Anticipated Discharge Disposition (PT): skilled nursing facility    Beena Ray PTA  7/27/2023    "

## 2023-07-28 LAB
ANION GAP SERPL CALCULATED.3IONS-SCNC: 12.6 MMOL/L (ref 5–15)
BASOPHILS # BLD AUTO: 0.09 10*3/MM3 (ref 0–0.2)
BASOPHILS NFR BLD AUTO: 1.1 % (ref 0–1.5)
BUN SERPL-MCNC: 47 MG/DL (ref 8–23)
BUN/CREAT SERPL: 36.4 (ref 7–25)
CALCIUM SPEC-SCNC: 9.1 MG/DL (ref 8.6–10.5)
CANCER AG19-9 SERPL-ACNC: 44.3 U/ML
CHLORIDE SERPL-SCNC: 101 MMOL/L (ref 98–107)
CO2 SERPL-SCNC: 23.4 MMOL/L (ref 22–29)
CREAT SERPL-MCNC: 1.29 MG/DL (ref 0.57–1)
DEPRECATED RDW RBC AUTO: 42.1 FL (ref 37–54)
EGFRCR SERPLBLD CKD-EPI 2021: 41.3 ML/MIN/1.73
EOSINOPHIL # BLD AUTO: 0.3 10*3/MM3 (ref 0–0.4)
EOSINOPHIL NFR BLD AUTO: 3.7 % (ref 0.3–6.2)
ERYTHROCYTE [DISTWIDTH] IN BLOOD BY AUTOMATED COUNT: 14 % (ref 12.3–15.4)
GLUCOSE BLDC GLUCOMTR-MCNC: 143 MG/DL (ref 70–130)
GLUCOSE BLDC GLUCOMTR-MCNC: 143 MG/DL (ref 70–130)
GLUCOSE BLDC GLUCOMTR-MCNC: 152 MG/DL (ref 70–130)
GLUCOSE BLDC GLUCOMTR-MCNC: 90 MG/DL (ref 70–130)
GLUCOSE SERPL-MCNC: 139 MG/DL (ref 65–99)
HCT VFR BLD AUTO: 32.8 % (ref 34–46.6)
HGB BLD-MCNC: 10.9 G/DL (ref 12–15.9)
IMM GRANULOCYTES # BLD AUTO: 0.04 10*3/MM3 (ref 0–0.05)
IMM GRANULOCYTES NFR BLD AUTO: 0.5 % (ref 0–0.5)
LYMPHOCYTES # BLD AUTO: 1.75 10*3/MM3 (ref 0.7–3.1)
LYMPHOCYTES NFR BLD AUTO: 21.7 % (ref 19.6–45.3)
MCH RBC QN AUTO: 27.7 PG (ref 26.6–33)
MCHC RBC AUTO-ENTMCNC: 33.2 G/DL (ref 31.5–35.7)
MCV RBC AUTO: 83.2 FL (ref 79–97)
MONOCYTES # BLD AUTO: 0.68 10*3/MM3 (ref 0.1–0.9)
MONOCYTES NFR BLD AUTO: 8.4 % (ref 5–12)
NEUTROPHILS NFR BLD AUTO: 5.19 10*3/MM3 (ref 1.7–7)
NEUTROPHILS NFR BLD AUTO: 64.6 % (ref 42.7–76)
NRBC BLD AUTO-RTO: 0 /100 WBC (ref 0–0.2)
PLATELET # BLD AUTO: 283 10*3/MM3 (ref 140–450)
PMV BLD AUTO: 9.8 FL (ref 6–12)
POTASSIUM SERPL-SCNC: 3.7 MMOL/L (ref 3.5–5.2)
RBC # BLD AUTO: 3.94 10*6/MM3 (ref 3.77–5.28)
SODIUM SERPL-SCNC: 137 MMOL/L (ref 136–145)
WBC NRBC COR # BLD: 8.05 10*3/MM3 (ref 3.4–10.8)

## 2023-07-28 PROCEDURE — 82948 REAGENT STRIP/BLOOD GLUCOSE: CPT

## 2023-07-28 PROCEDURE — 99232 SBSQ HOSP IP/OBS MODERATE 35: CPT | Performed by: INTERNAL MEDICINE

## 2023-07-28 PROCEDURE — 86301 IMMUNOASSAY TUMOR CA 19-9: CPT | Performed by: NURSE PRACTITIONER

## 2023-07-28 PROCEDURE — 86316 IMMUNOASSAY TUMOR OTHER: CPT | Performed by: NURSE PRACTITIONER

## 2023-07-28 PROCEDURE — 85025 COMPLETE CBC W/AUTO DIFF WBC: CPT | Performed by: INTERNAL MEDICINE

## 2023-07-28 PROCEDURE — 84206 ASSAY OF PROINSULIN: CPT | Performed by: NURSE PRACTITIONER

## 2023-07-28 PROCEDURE — 63710000001 INSULIN GLARGINE PER 5 UNITS: Performed by: INTERNAL MEDICINE

## 2023-07-28 PROCEDURE — 63710000001 INSULIN LISPRO (HUMAN) PER 5 UNITS: Performed by: NURSE PRACTITIONER

## 2023-07-28 PROCEDURE — 80048 BASIC METABOLIC PNL TOTAL CA: CPT | Performed by: HOSPITALIST

## 2023-07-28 PROCEDURE — 83519 RIA NONANTIBODY: CPT | Performed by: NURSE PRACTITIONER

## 2023-07-28 PROCEDURE — 99232 SBSQ HOSP IP/OBS MODERATE 35: CPT | Performed by: NURSE PRACTITIONER

## 2023-07-28 PROCEDURE — 83525 ASSAY OF INSULIN: CPT | Performed by: NURSE PRACTITIONER

## 2023-07-28 PROCEDURE — 63710000001 INSULIN LISPRO (HUMAN) PER 5 UNITS: Performed by: HOSPITALIST

## 2023-07-28 PROCEDURE — 29581 APPL MULTLAYER CMPRN SYS LEG: CPT

## 2023-07-28 RX ORDER — FERROUS SULFATE 325(65) MG
325 TABLET ORAL
Status: DISCONTINUED | OUTPATIENT
Start: 2023-07-28 | End: 2023-07-31 | Stop reason: HOSPADM

## 2023-07-28 RX ADMIN — ATORVASTATIN CALCIUM 80 MG: 20 TABLET, FILM COATED ORAL at 21:17

## 2023-07-28 RX ADMIN — ZINC OXIDE 1 APPLICATION: 200 OINTMENT TOPICAL at 21:21

## 2023-07-28 RX ADMIN — INSULIN LISPRO 18 UNITS: 100 INJECTION, SOLUTION INTRAVENOUS; SUBCUTANEOUS at 17:18

## 2023-07-28 RX ADMIN — FERROUS SULFATE TAB 325 MG (65 MG ELEMENTAL FE) 325 MG: 325 (65 FE) TAB at 16:19

## 2023-07-28 RX ADMIN — INSULIN LISPRO 3 UNITS: 100 INJECTION, SOLUTION INTRAVENOUS; SUBCUTANEOUS at 12:12

## 2023-07-28 RX ADMIN — Medication 10 ML: at 08:31

## 2023-07-28 RX ADMIN — HYDROCODONE BITARTRATE AND ACETAMINOPHEN 1 TABLET: 5; 325 TABLET ORAL at 21:17

## 2023-07-28 RX ADMIN — AMLODIPINE BESYLATE 5 MG: 5 TABLET ORAL at 08:30

## 2023-07-28 RX ADMIN — LEVOTHYROXINE SODIUM 112 MCG: 0.11 TABLET ORAL at 06:29

## 2023-07-28 RX ADMIN — HYDROCODONE BITARTRATE AND ACETAMINOPHEN 1 TABLET: 5; 325 TABLET ORAL at 05:00

## 2023-07-28 RX ADMIN — INSULIN GLARGINE 65 UNITS: 100 INJECTION, SOLUTION SUBCUTANEOUS at 21:29

## 2023-07-28 RX ADMIN — INSULIN LISPRO 18 UNITS: 100 INJECTION, SOLUTION INTRAVENOUS; SUBCUTANEOUS at 08:31

## 2023-07-28 RX ADMIN — Medication 10 ML: at 21:18

## 2023-07-28 RX ADMIN — ZINC OXIDE 1 APPLICATION: 200 OINTMENT TOPICAL at 11:54

## 2023-07-28 RX ADMIN — PREGABALIN 50 MG: 50 CAPSULE ORAL at 08:31

## 2023-07-28 RX ADMIN — PREGABALIN 50 MG: 50 CAPSULE ORAL at 16:19

## 2023-07-28 RX ADMIN — TERAZOSIN 1 MG: 1 CAPSULE ORAL at 21:18

## 2023-07-28 RX ADMIN — HYDROCODONE BITARTRATE AND ACETAMINOPHEN 1 TABLET: 5; 325 TABLET ORAL at 11:52

## 2023-07-28 RX ADMIN — PANTOPRAZOLE SODIUM 40 MG: 40 TABLET, DELAYED RELEASE ORAL at 06:29

## 2023-07-28 RX ADMIN — LISINOPRIL 40 MG: 40 TABLET ORAL at 08:30

## 2023-07-28 RX ADMIN — CHLORTHALIDONE 25 MG: 25 TABLET ORAL at 08:30

## 2023-07-28 RX ADMIN — BISOPROLOL FUMARATE 5 MG: 5 TABLET, FILM COATED ORAL at 08:30

## 2023-07-28 RX ADMIN — Medication 1000 MCG: at 08:31

## 2023-07-28 RX ADMIN — PREGABALIN 50 MG: 50 CAPSULE ORAL at 21:17

## 2023-07-28 RX ADMIN — DULOXETINE HYDROCHLORIDE 30 MG: 30 CAPSULE, DELAYED RELEASE ORAL at 08:31

## 2023-07-28 RX ADMIN — INSULIN LISPRO 18 UNITS: 100 INJECTION, SOLUTION INTRAVENOUS; SUBCUTANEOUS at 12:12

## 2023-07-28 NOTE — THERAPY TREATMENT NOTE
Acute Care - Occupational Therapy Lymphedema Treatment Note  [facility]     Patient Name: Halie Guidry  : 1940  MRN: [id number]  Today's Date: 2023             Admit Date: 2023       ICD-10-CM ICD-9-CM   1. Bilateral leg pain  M79.604 729.5    M79.605    2. Unable to ambulate  R26.2 719.7   3. Peripheral edema  R60.9 782.3     Patient Active Problem List   Diagnosis    Compression fracture    Lumbar degenerative disc disease    Type 2 diabetes mellitus with hyperglycemia, with long-term current use of insulin    Hyperlipidemia    Benign essential hypertension    Primary hypothyroidism    Leukocytosis    Neuropathy    Osteoporosis    Proteinuria    Tobacco abuse    Diabetic eye exam    Generalized weakness    Spinal stenosis    Scoliosis    Arthritis    VBI (vertebrobasilar insufficiency)    Orthostatic hypotension    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    Severe hypothyroidism    Noncompliance with medication regimen    Vitamin D deficiency disease    Altered mental status    Tremor    Seizure    Stage 3a chronic kidney disease    Thoracic degenerative disc disease    Metabolic encephalopathy    VIPUL (acute kidney injury)    Hyperglycemia    Type II diabetes mellitus, uncontrolled    Lower abdominal pain    Transaminitis    COVID-19 virus detected    UTI (urinary tract infection)    Emphysematous cystitis    Choledocholithiasis    Hypokalemia    Hypoxia    Generalized abdominal pain    History of Clostridium difficile infection    History of ERCP    Acute UTI (urinary tract infection)    Hypomagnesemia    Burning pain    Anxiety disorder    Neuropathic pain    Intertrigo    Weakness of both lower extremities    Accelerated hypertension    Acute cystitis without hematuria    Altered mental status, unspecified altered mental status type    Left lower lobe pneumonia    Acute pain of left foot    Cellulitis and abscess of left lower extremity    Hypertensive kidney disease  with stage 3a chronic kidney disease    Bilateral leg pain     Past Medical History:   Diagnosis Date    Acute metabolic encephalopathy 6/24/2022    Anxiety     Arthritis     Benign essential hypertension 08/20/2014    Bleeding disorder     Depression     Diabetes     Diabetes mellitus, type 2     Disc degeneration, lumbar     Headache, tension-type     Hyperlipidemia     Hypothyroidism     Neuropathy     Osteoporosis 09/09/2015    Peripheral neuropathy     Rotator cuff tear, left     Scoliosis     Shoulder pain     LEFT, TORN ROTATOR CUFF S/P FALL    Spinal stenosis      Past Surgical History:   Procedure Laterality Date    APPENDECTOMY      BILATERAL BREAST REDUCTION Bilateral 08/2015    CATARACT EXTRACTION  03/2015    CHOLECYSTECTOMY WITH INTRAOPERATIVE CHOLANGIOGRAM N/A 3/27/2022    Procedure: CHOLECYSTECTOMY LAPAROSCOPIC INTRAOPERATIVE CHOLANGIOGRAM;  Surgeon: Aiyana Hill MD;  Location: [facility] MAIN OR;  Service: General;  Laterality: N/A;    COLONOSCOPY  06/05/2015    WNL    ERCP N/A 2/25/2022    Procedure: ENDOSCOPIC RETROGRADE CHOLANGIOPANCREATOGRAPHY with sphincterotomy and balloon sweep;  Surgeon: Chilo Wilhelm MD;  Location: [facility] ENDOSCOPY;  Service: Gastroenterology;  Laterality: N/A;  PRE/POST - CBD stones    ERCP N/A 3/28/2022    Procedure: ENDOSCOPIC RETROGRADE CHOLANGIOPANCREATOGRAPHY WITH SPHINCTEROTOMY AND BALLOON SWEEP;  Surgeon: Chilo Wilhelm MD;  Location: [facility] ENDOSCOPY;  Service: Gastroenterology;  Laterality: N/A;  PRE: COMMON DUCT STONE  POST: COMMON DUCT STONE    KYPHOPLASTY      REDUCTION MAMMAPLASTY      TONSILLECTOMY          Lymphedema       Row Name 07/28/23 0852 07/27/23 1545 07/26/23 1200       Subjective Pain    Able to rate subjective pain? yes  -LE -- yes  -KP    Pre-Treatment Pain Level -- -- 3  -KP    Post-Treatment Pain Level 2  -LE -- 3  -KP    Subjective Pain Comment B LE.  very tender to touch.  -LE -- B LE  -KP    Recorded by [LE] Gwendolyn Cheney, OTR   [KP] Christina Redmond, TOMÁSR       Subjective Comments    Subjective Comments pt focus is on evolving plan to address imaging findings and need for biopsy.  -LE agrees to unwrap and rewrap legs.  -LE pt agrees to wraps and reports legs look better.  -KP    Recorded by [LE] Gwendolyn Cheney, OTR [LE] Gwendolyn Cheney OTR [KP] Christina Redmond, OTR       Vital Signs    O2 Delivery Pre Treatment room air  -LE room air  -LE --    Pre Patient Position Supine  -LE -- --    Intra Patient Position Supine  -LE -- --    Post Patient Position Supine  -LE -- --    Recorded by [LE] Gwendolyn Cheney, TOMÁSR [LE] Gwendolyn Cheney OTR        Lymphedema Assessment    Lymphedema Assessment Comments -- reduction in edema to slight to no edema.  today used only 2 wraps per leg.  -LE --    Recorded by  [LE] Gwendolyn Cheney, GREGG        Lymphedema Edema Assessment    Pitting Edema --  none.  -LE -- Mild  -KP    Edema Assessment Comment noted wrinkles along calves.  -LE -- pt LE decreased in swelling, still red and tender.  -KP    Recorded by [LE] Gwendolyn Cheney, GREGG  [KP] Christina Redmond, OTR       Skin Changes/Observations    Location/Assessment Lower Extremity  -LE -- Lower Extremity  -KP    Lower Extremity Conditions shiny;hairless  dry skin at toes and foot is improving with daily washing and lotion before wrapping.  -LE hairless;shiny;dry;clean;intact  -LE bilateral:;clean;shiny;dry;hairless  -KP    Lower Extremity Color/Pigment --  not as red today.  faint redness.  -LE red;bilateral:  mild redness.  -LE --    Skin Observations Comment RN present during wrapping.  -LE bandage per nsg to below big toe  -LE bandage to L toe  -KP    Recorded by [LE] Gwendolyn Cheney, GREGG [LE] Gwendolyn Cheney, GREGG [KP] Christina Redmond, OTR       Compression/Skin Care    Compression/Skin Care skin care;wrapping location;bandaging;compression garment;remove bandages  -LE wrapping location;skin care;compression garment;remove bandages;bandaging  -LE skin  care;wrapping location;bandaging;compression garment;remove bandages  -KP    Skin Care washed/dried;lotion applied  -LE washed/dried  -LE washed/dried;moisturizing lotion applied  -KP    Wrapping Location lower extremity  -LE lower extremity  -LE lower extremity  -KP    Wrapping Location LE bilateral:;foot to knee  L forefoot open for bandaging per RN.  -LE bilateral:;foot to knee  L foot began wraps at forefoot to allow access to bandage  -LE bilateral:;foot to knee  -KP    Wrapping Comments unwrapped and then rewrapped.  -LE unwrapped then rewrap  -LE unwrapped LEs, washed , lotion applied and re wrapped  -KP    Bandage Layers padding/fluff layer  -LE padding/fluff layer;cotton liner  -LE padding/fluff layer  -KP    Bandaging Comments B foot to knee  -LE B foot to knee  -LE B LE foot to knee  -KP    Bandaging Technique light compression  -LE light compression  -LE circumferential/spiral;light compression;moderate compression  -KP    Compression Garment Comments continue with 2 wraps to each leg to see how edema responds to less compression.  -LE -- pt reports legs are looking better, are still painful shawn to the touch and when washing her legs. pt wrapped w TG9 stockinette, 10cm artiflex, and partial of a 15 cm artiflex, 8cm rosidal per LE and two 10cm rosidal per LE.  -KP    Remove Bandages -- removed to wash and assess skin and edema  -LE --    Compression/Skin Care Comments -- reduced from 3 to 2 comprilan due to reduction in edema  -LE --    Recorded by [LE] Gwendolyn Cheney OTR [LE] Gwendolyn Cheney OTR [KP] Christina Redmond OTR      Row Name 07/25/23 1500             Subjective Pain    Able to rate subjective pain? yes  -KP      Pre-Treatment Pain Level 0  -KP      Post-Treatment Pain Level 0  -KP      Recorded by [KP] Christina Redmond OTR              Lymphedema Edema Assessment    Pitting Edema Mild  -KP      Edema Assessment Comment pt LEs have decreased today in edema and pt reports they look a  lot better.  -KP      Recorded by [] Christina Redmond, OTR              Skin Changes/Observations    Location/Assessment Lower Extremity  -KP      Lower Extremity Conditions bilateral:;clean;shiny;dry;hairless  -KP      Skin Observations Comment B skin intact. bandage to L big toe.  -KP      Recorded by [] Christina Redmond, OTR              Compression/Skin Care    Compression/Skin Care skin care;wrapping location;bandaging;compression garment;remove bandages  -KP      Skin Care washed/dried;moisturizing lotion applied  -KP      Wrapping Location lower extremity  -KP      Wrapping Location LE bilateral:;foot to knee  -KP      Wrapping Comments unwrapped and washed legs and applied lotion. re wrapped. legs looking better per pt report  -KP      Bandage Layers padding/fluff layer  -KP      Bandaging Comments B LE wrapped foot to knee. TG9 stockinette, artiflex 10cm and partial of a 15 cm. Rosidal 8cm and 2 10 cm per LE.  -KP      Bandaging Technique moderate compression  -KP      Recorded by [] Christina Redmond, OTR                User Key  (r) = Recorded By, (t) = Taken By, (c) = Cosigned By      Initials Name Effective Dates    LE Gwendolyn Cheney, OTR 06/16/21 -     Christina Davis, OTR 07/11/23 -                     OT ASSESSMENT FLOWSHEET (last 12 hours)       OT Evaluation and Treatment       Row Name 07/28/23 0856                   OT Time and Intention    Document Type therapy note (daily note)  -LE        Mode of Treatment individual therapy;occupational therapy  -LE           Lower Body Dressing Assessment/Training    Cannon Level (Lower Body Dressing) doff;don;socks;dependent (less than 25% patient effort)  -LE        Comment, (Lower Body Dressing) supine to unwrap legs.  -LE           Self-Feeding Assessment/Training    Comment, (Feeding) set up to eat.  -LE           Toileting Assessment/Training    Comment, (Toileting) purwick.  -LE           Wound 07/21/23 9234  lateral foot Diabetic Ulcer    Wound - Properties Group Placement Date: 07/21/23  -KG Placement Time: 2259  -KG Present on Hospital Admission: Y  -KG Orientation: lateral  -KG Location: foot  -KG Primary Wound Type: Diabetic ulc  -KG    Retired Wound - Properties Group Placement Date: 07/21/23  -KG Placement Time: 2259  -KG Present on Hospital Admission: Y  -KG Orientation: lateral  -KG Location: foot  -KG Primary Wound Type: Diabetic ulc  -KG    Retired Wound - Properties Group Date first assessed: 07/21/23 -KG Time first assessed: 2259  -KG Present on Hospital Admission: Y  -KG Location: foot  -KG Primary Wound Type: Diabetic ulc  -KG       Coping    Observed Emotional State anxious  concerned about medical testing.  -LE           Positioning and Restraints    Pre-Treatment Position in bed  -LE        Post Treatment Position bed  -LE        In Bed call light within reach;encouraged to call for assist;exit alarm on  upright in bed. eating breakfast.  -LE                  User Key  (r) = Recorded By, (t) = Taken By, (c) = Cosigned By      Initials Name Effective Dates    Gwendolyn Multani OTR 06/16/21 -     Brigid Lau, RN 05/16/23 -                      Occupational Therapy Education       Title: PT OT SLP Therapies (In Progress)       Topic: Occupational Therapy (In Progress)       Point: ADL training (Done)       Description:   Instruct learner(s) on proper safety adaptation and remediation techniques during self care or transfers.   Instruct in proper use of assistive devices.                  Learning Progress Summary             Patient Acceptance, E, VU by JENNIFER at 7/22/2023 8929    Comment: role of OT, d/c rec                         Point: Home exercise program (Not Started)       Description:   Instruct learner(s) on appropriate technique for monitoring, assisting and/or progressing therapeutic exercises/activities.                  Learner Progress:  Not documented in this visit.              Point:  Precautions (Done)       Description:   Instruct learner(s) on prescribed precautions during self-care and functional transfers.                  Learning Progress Summary             Patient Acceptance, E, VU by  at 7/22/2023 1347    Comment: role of OT, d/c rec                         Point: Body mechanics (Done)       Description:   Instruct learner(s) on proper positioning and spine alignment during self-care, functional mobility activities and/or exercises.                  Learning Progress Summary             Patient Acceptance, E, VU by  at 7/22/2023 1347    Comment: role of OT, d/c rec                                         User Key       Initials Effective Dates Name Provider Type Discipline    JENNIFER 08/20/21 -  Arcelia Valentin OT Occupational Therapist OT                      OT Recommendation and Plan     Plan of Care Review  Plan of Care Reviewed With: patient (ed pt OT will follow up tomorrow for wrapping.)  Plan of Care Reviewed With: patient (ed pt OT will follow up tomorrow for wrapping.)            Time Calculation:     Time Calculation- OT       Row Name 07/28/23 0858             Time Calculation- OT    OT Start Time 0806  -LE      OT Stop Time 0845  -LE      OT Time Calculation (min) 39 min  -LE      Total Timed Code Minutes- OT 39 minute(s)  -LE      OT Received On 07/28/23  -LE      OT - Next Appointment 07/31/23  -LE                User Key  (r) = Recorded By, (t) = Taken By, (c) = Cosigned By      Initials Name Provider Type    Gwendolyn Multani, OTR Occupational Therapist                    Therapy Charges for Today       Code Description Service Date Service Provider Modifiers Qty    58604584502 HC OT MULTI LAYER COMP SYS BELOW KNEE BILATERAL 7/27/2023 Gwendolyn Cheney OTLISHA  3    89792574457 HC OT MULTI LAYER COMP SYS BELOW KNEE BILATERAL 7/28/2023 Gwendolyn Cheney, OTR  3                        Gwendolyn Cheney OTR  7/28/2023

## 2023-07-28 NOTE — PLAN OF CARE
Goal Outcome Evaluation:  Plan of Care Reviewed With: patient        Progress: no change  Outcome Evaluation: VSS. Norco given for BLE pain. BSG monitored, due Insulin given. Snacks taken. External catheter in place with adequate urine output. Bilateral unna boots in place. Slept well.

## 2023-07-28 NOTE — PLAN OF CARE
Goal Outcome Evaluation:  Plan of Care Reviewed With: patient        Progress: no change  Outcome Evaluation: Vss, afebrile, Norco given for bilateral leg pain, vincenzo boot removed from left leg per patient request d/t discomfort, scd applied to LLE, monitoring blood sugars, no bowel movement today, still need stool sample to rule out c-diff.

## 2023-07-28 NOTE — PROGRESS NOTES
Name: Halie Guidry ADMIT: 2023   : 1940  PCP: Napoleon Mead MD    MRN: [id number] LOS: 2 days   AGE/SEX: 83 y.o. female  ROOM: [facility]     Subjective   Subjective   No new complaints. Denies chest pain shortness of breath, palpitations, dizziness, numbness, tingling, abdominal pain.     Objective   Objective   Vital Signs  Temp:  [97 øF (36.1 øC)-98.2 øF (36.8 øC)] 97.3 øF (36.3 øC)  Heart Rate:  [55-65] 57  Resp:  [18] 18  BP: (135-159)/(61-66) 159/66  SpO2:  [95 %-97 %] 97 %  on   ;   Device (Oxygen Therapy): room air  Body mass index is 31.44 kg/mý.    Physical Exam  Constitutional:       General: She is not in acute distress.     Appearance: She is not toxic-appearing.   HENT:      Head: Normocephalic and atraumatic.   Eyes:      Extraocular Movements: Extraocular movements intact.   Cardiovascular:      Rate and Rhythm: Normal rate and regular rhythm.   Pulmonary:      Effort: Pulmonary effort is normal. No respiratory distress.      Breath sounds: Normal breath sounds. No wheezing or rhonchi.   Abdominal:      General: Bowel sounds are normal.      Palpations: Abdomen is soft.      Tenderness: There is no abdominal tenderness. There is no guarding or rebound.   Musculoskeletal:      Cervical back: Normal range of motion.      Comments: Lower legs wrapped   Skin:     General: Skin is warm and dry.   Neurological:      General: No focal deficit present.      Mental Status: She is alert and oriented to person, place, and time.   Psychiatric:         Mood and Affect: Mood normal.         Behavior: Behavior normal.         Thought Content: Thought content normal.     Results Review  I reviewed the patient's new clinical results.  Results from last 7 days   Lab Units 23  0558 23  0530 23  0457 23  0515   WBC 10*3/mm3 8.05 9.35 9.06 9.28   HEMOGLOBIN g/dL 10.9* 11.3* 11.3* 11.8*   PLATELETS 10*3/mm3 283 280 284 284       Results from last 7 days   Lab Units  07/28/23  0558 07/27/23  0530 07/26/23  0457 07/25/23  0515   SODIUM mmol/L 137 140 137 136   POTASSIUM mmol/L 3.7 4.0 4.0 4.1   CHLORIDE mmol/L 101 105 103 103   CO2 mmol/L 23.4 22.1 21.6* 20.0*   BUN mg/dL 47* 41* 37* 36*   CREATININE mg/dL 1.29* 1.34* 1.18* 1.13*   GLUCOSE mg/dL 139* 190* 179* 112*       Lab Results   Component Value Date    ANIONGAP 12.6 07/28/2023     Estimated Creatinine Clearance: 39.6 mL/min (A) (by C-G formula based on SCr of 1.29 mg/dL (H)).    Results from last 7 days   Lab Units 07/21/23  1846   ALBUMIN g/dL 3.9   BILIRUBIN mg/dL 0.4   ALK PHOS U/L 109   AST (SGOT) U/L 18   ALT (SGPT) U/L 10       Results from last 7 days   Lab Units 07/28/23  0558 07/27/23  0530 07/26/23  0457 07/25/23  0515 07/22/23  0412 07/21/23  1846   CALCIUM mg/dL 9.1 8.6 8.7 9.2   < > 8.7   ALBUMIN g/dL  --   --   --   --   --  3.9    < > = values in this interval not displayed.         Glucose   Date/Time Value Ref Range Status   07/28/2023 1135 152 (H) 70 - 130 mg/dL Final     Comment:     Meter: [id number] : [id number] Jurgen Melgar CNA   07/28/2023 0628 143 (H) 70 - 130 mg/dL Final     Comment:     Meter: [id number] : [id number] Helga Cole Jean RN   07/27/2023 2012 251 (H) 70 - 130 mg/dL Final     Comment:     Meter: [id number] : [id number] Amador Mary NA   07/27/2023 1752 113 70 - 130 mg/dL Final     Comment:     Meter: [id number] : [id number] Anna Marleny NA   07/27/2023 1553 68 (L) 70 - 130 mg/dL Final     Comment:     Meter: [id number] : [id number] Anna Farmer MARY   07/27/2023 1131 210 (H) 70 - 130 mg/dL Final     Comment:     Meter: [id number] : [id number] Anna Farmer MARY   07/27/2023 0612 202 (H) 70 - 130 mg/dL Final     Comment:     Meter: [id number] : [id number] Alvin HERNANDEZ       No radiology results for the last day    Scheduled Meds  amLODIPine, 5 mg, Oral, Daily  atorvastatin, 80 mg, Oral, Nightly  bisoprolol, 5 mg, Oral, Daily  chlorthalidone, 25 mg,  Oral, Daily  DULoxetine, 30 mg, Oral, Daily  ferrous sulfate, 325 mg, Oral, Daily With Breakfast  hydrocortisone-bacitracin-zinc oxide-nystatin, 1 application , Topical, BID  insulin glargine, 65 Units, Subcutaneous, Nightly  insulin lispro, 18 Units, Subcutaneous, TID With Meals  insulin lispro, 3-14 Units, Subcutaneous, 4x Daily With Meals & Nightly  levothyroxine, 112 mcg, Oral, Once per day on Mon Tue Wed Thu Fri Sat  levothyroxine, 168 mcg, Oral, Once per day on Sun  lisinopril, 40 mg, Oral, Daily  pantoprazole, 40 mg, Oral, Q AM  pregabalin, 50 mg, Oral, TID  senna-docusate sodium, 2 tablet, Oral, BID  sodium chloride, 10 mL, Intravenous, Q12H  terazosin, 1 mg, Oral, Nightly  cyanocobalamin, 1,000 mcg, Oral, Daily    Continuous Infusions   PRN Meds    senna-docusate sodium **AND** polyethylene glycol **AND** bisacodyl **AND** bisacodyl    dextrose    dextrose    glucagon (human recombinant)    HYDROcodone-acetaminophen    melatonin    sodium chloride    sodium chloride     Diet  Diet: Cardiac Diets, Diabetic Diets; Healthy Heart (2-3 Na+); Consistent Carbohydrate; Texture: Regular Texture (IDDSI 7); Fluid Consistency: Thin (IDDSI 0)    I have personally reviewed:  [x]  Medications  [x]  Laboratory   []  Microbiology   []  Radiology  []  EKG/Telemetry   []  Cardiology/Vascular   []  Pathology   []  Records      Assessment/Plan     Active Hospital Problems    Diagnosis  POA    **Bilateral leg pain [M79.604, M79.605]  Yes    Hypertensive kidney disease with stage 3a chronic kidney disease [I12.9, N18.31]  Yes    Anxiety disorder [F41.9]  Yes    Noncompliance with medication regimen [Z91.148]  Not Applicable    Hyperlipidemia [E78.5]  Yes    Primary hypothyroidism [E03.9]  Yes    Type 2 diabetes mellitus with hyperglycemia, with long-term current use of insulin [E11.65, Z79.4]  Not Applicable    Benign essential hypertension [I10]  Yes      Resolved Hospital Problems   No resolved problems to  display.     83 y.o. female with history of diabetes, neuropathy, diabetic foot ulcer, hypertension, hyperlipidemia, hypothyroidism, cognitive impairment, recurrent UTIs presented with bilateral lower extremity swelling and pain since May and decreased activity due to pain. She was in the hospital in May for lower extremity cellulitis declined SNF recommendation.    Leg pain  Venous insufficiency  -Outpatient Doppler negative for DVT  -Right lower leg films severe degenerative changes at the knee    Abnormal chest x-ray  -Radiology recommended CT with contrast   -CT scan no suspicious right hilar pulmonary mass. Mild right basilar atelectasis.     Bilateral renal lesions seen on above CT imaging  -CT of the abdomen pelvis was done which was suggestive of hypoenhancing mass at the pancreatic head and there is a exophytic component or node adjacently suggestive of neuroendocrine tumor.  Recommendation is endoscopic ultrasound with FNA.  GI consult has been obtained and serology work-up has been initiated for neuroendocrine tumor and may consider colonoscopy versus sigmoidoscopy as an outpatient basis to rule out microscopic colitis.  GI recommends outpatient endoscopy ultrasound with FNA and they are making arrangements regarding the same.  -Monitor renal function with CKD (creatinine stable)    DM2 with neuropathy  -Has been noncompliant as an outpatient  -A1c 11.80% last month  -Adjusted long-acting and mealtime insulin and glucoses improved today.   -Continue correctional insulin   -No more adjustments today monitor overnight adjust further if needed    Hypertension  -Continue lisinopril and blood pressure is in acceptable range.  Avoid amlodipine due to lower extremity edema and beta-blockers due to borderline low heart rate.    Hyperlipidemia  -Statin    Hypothyroidism  -Levothyroxine    CKD 3A  -Continue to monitor (received contrast yesterday and will get today)    SCDs for DVT prophylaxis    Discussed with  patient and nursing staff    Discharge:   To rehab once a bed is available    Copied text on this note has been reviewed by me on 7/28/2023    Kelechi Sanchez MD  [facility]  07/28/23

## 2023-07-28 NOTE — CASE MANAGEMENT/SOCIAL WORK
Continued Stay Note  [facility]     Patient Name: Halie Guidry  MRN: [id number]  Today's Date: 7/28/2023    Admit Date: 7/21/2023    Plan: Pre-cert started   Discharge Plan       Row Name 07/28/23 1454       Plan    Plan Pre-cert started    Plan Comments Precert pending, all clinicals sent to [facility] for admission to [facility]. Awaiting decision. Tatyana JUSTIN RN, CCP                                           Discharge Codes    No documentation.                 Expected Discharge Date and Time       Expected Discharge Date Expected Discharge Time    Jul 29, 2023               Tatyana Victoria RN

## 2023-07-28 NOTE — PLAN OF CARE
Goal Outcome Evaluation:    Pt seen by OT for B LE wrapping.  Edema has reduced but will cont wraps at this time as h/o chronic edema.  Ask pt to have family bring in compression socks from home to try in lieu of lymph wrapping to manage edema.  Noted medically involved and medical plans are evolving.   Discuss with RN today that nursing will wrap over the weekend (instructions left on board in pt room) and OT will follow up Monday.  Hopefully will have compression socks to try.   Pt continues with c/o pain and tenderness in B LE that has not worsened or improved with wrapping.

## 2023-07-28 NOTE — PROGRESS NOTES
Continued Stay Note  [facility]     Patient Name: Halie Guidry  MRN: [id number]  Today's Date: 7/28/2023    Admit Date: 7/21/2023    Plan: Pre-cert started   Discharge Plan       Row Name 07/28/23 1528       Plan    Plan Comments Spoke to patient regarding transportation at [place]. Requested a WC van to be scheduled.      Row Name 07/28/23 3779       Plan    Plan Pre-cert started    Plan Comments Precert pending, all clinicals sent to [facility] for admission to [facility]. Awaiting decision. Tatyana JUSTIN RN, CCP      Row Name 07/28/23 1315       Plan    Plan [facility] PENDING Pre-cert    Plan Comments Msg sent to [facility] Post Acute Authorizations <[email or web address] to begin pre-cert to [facility]. Updated Mame with [employer].                   Discharge Codes    No documentation.                 Expected Discharge Date and Time       Expected Discharge Date Expected Discharge Time    Jul 29, 2023               Vannessa Mcdowell RN

## 2023-07-28 NOTE — PROGRESS NOTES
Gastroenterology   Inpatient Progress Note    Reason for Follow Up: Pancreatic head mass    Subjective     Interval History:   Patient denies any n/v or abdominal pain. Patient reports plans to go to rehab for approximately 3 weeks following discharge.  She does report living at home with her son.  Plan for outpatient EUS with FNA for pancreatic head mass.  CA 19-9 elevated.  Additional lab work still pending.    Current Facility-Administered Medications:     amLODIPine (NORVASC) tablet 5 mg, 5 mg, Oral, Daily, Yanet Bartlett APRN, 5 mg at 07/28/23 0830    atorvastatin (LIPITOR) tablet 80 mg, 80 mg, Oral, Nightly, Yanet Bartlett APRN, 80 mg at 07/27/23 2018    sennosides-docusate (PERICOLACE) 8.6-50 MG per tablet 2 tablet, 2 tablet, Oral, BID, 2 tablet at 07/25/23 0925 **AND** polyethylene glycol (MIRALAX) packet 17 g, 17 g, Oral, Daily PRN, 17 g at 07/24/23 2144 **AND** bisacodyl (DULCOLAX) EC tablet 5 mg, 5 mg, Oral, Daily PRN **AND** bisacodyl (DULCOLAX) suppository 10 mg, 10 mg, Rectal, Daily PRN, Yanet Bartlett APRN    bisoprolol (ZEBeta) tablet 5 mg, 5 mg, Oral, Daily, Yanet Bartlett APRN, 5 mg at 07/28/23 0830    chlorthalidone (HYGROTON) tablet 25 mg, 25 mg, Oral, Daily, Yanet Bartlett APRN, 25 mg at 07/28/23 0830    dextrose (D50W) (25 g/50 mL) IV injection 25 g, 25 g, Intravenous, Q15 Min PRN, Yanet Bartlett APRN    dextrose (GLUTOSE) oral gel 15 g, 15 g, Oral, Q15 Min PRN, Yanet Bartlett APRN    DULoxetine (CYMBALTA) DR capsule 30 mg, 30 mg, Oral, Daily, Yanet Bartlett APRN, 30 mg at 07/28/23 0831    glucagon (GLUCAGEN) injection 1 mg, 1 mg, Intramuscular, Q15 Min PRN, Yanet Bartlett APRN    HYDROcodone-acetaminophen (NORCO) 5-325 MG per tablet 1 tablet, 1 tablet, Oral, Q6H PRN, Efe Moses MD, 1 tablet at 07/28/23 0500    hydrocortisone-bacitracin-zinc oxide-nystatin (MAGIC BARRIER) ointment 1 application , 1  application , Topical, BID, Efe Moses MD, 1 application  at 07/27/23 2019    insulin glargine (LANTUS, SEMGLEE) injection 65 Units, 65 Units, Subcutaneous, Nightly, Kelechi Sanchez MD, 65 Units at 07/27/23 2019    insulin lispro (HUMALOG/ADMELOG) injection 18 Units, 18 Units, Subcutaneous, TID With Meals, Efe Moses MD, 18 Units at 07/28/23 0831    insulin lispro (HUMALOG/ADMELOG) injection 3-14 Units, 3-14 Units, Subcutaneous, 4x Daily With Meals & Nightly, Yanet Bartlett APRN, 5 Units at 07/27/23 2020    levothyroxine (SYNTHROID, LEVOTHROID) tablet 112 mcg, 112 mcg, Oral, Once per day on Mon Tue Wed Thu Fri Sat, Yanet Bartlett APRN, 112 mcg at 07/28/23 0629    levothyroxine (SYNTHROID, LEVOTHROID) tablet 168 mcg, 168 mcg, Oral, Once per day on Sun, Yanet Bartlett APRN, 168 mcg at 07/23/23 0600    lisinopril (PRINIVIL,ZESTRIL) tablet 40 mg, 40 mg, Oral, Daily, Yanet Bartlett APRN, 40 mg at 07/28/23 0830    melatonin tablet 5 mg, 5 mg, Oral, Nightly PRN, Ceci Ram APRN, 5 mg at 07/27/23 2221    pantoprazole (PROTONIX) EC tablet 40 mg, 40 mg, Oral, Q AM, Yanet Bartlett APRN, 40 mg at 07/28/23 0629    pregabalin (LYRICA) capsule 50 mg, 50 mg, Oral, TID, Yanet Bartlett APRN, 50 mg at 07/28/23 0831    sodium chloride 0.9 % flush 10 mL, 10 mL, Intravenous, Q12H, Yanet Bartlett APRN, 10 mL at 07/28/23 0831    sodium chloride 0.9 % flush 10 mL, 10 mL, Intravenous, PRN, Yanet Bartlett APRN    sodium chloride 0.9 % infusion 40 mL, 40 mL, Intravenous, PRN, Yanet Bartlett APRN    terazosin (HYTRIN) capsule 1 mg, 1 mg, Oral, Nightly, Yanet Bartlett APRN, 1 mg at 07/27/23 2019    vitamin B-12 (CYANOCOBALAMIN) tablet 1,000 mcg, 1,000 mcg, Oral, Daily, Yanet Bartlett APRN, 1,000 mcg at 07/28/23 0831  Review of Systems:    The following systems were reviewed and negative;  gastrointestinal    Objective      Vital Signs  Temp:  [97 øF (36.1 øC)-98.2 øF (36.8 øC)] 97.2 øF (36.2 øC)  Heart Rate:  [55-65] 55  Resp:  [18] 18  BP: (134-147)/(61-72) 138/63  Body mass index is 31.44 kg/mý.    Intake/Output Summary (Last 24 hours) at 7/28/2023 0836  Last data filed at 7/28/2023 0457  Gross per 24 hour   Intake 480 ml   Output 1800 ml   Net -1320 ml     No intake/output data recorded.     Physical Exam:   General: patient awake, alert and cooperative   Eyes: no scleral icterus   Skin: warm and dry, not jaundiced   Abdomen: soft, nontender, nondistended; normal bowel sounds, no masses palpated, no periumbical lymphadenopathy   Psychiatric: Appropriate affect and behavior     Results Review:     I reviewed the patient's new clinical results.  I reviewed the patient's new imaging results and agree with the interpretation.  I reviewed the patient's other test results and agree with the interpretation    Results from last 7 days   Lab Units 07/28/23  0558 07/27/23  0530 07/26/23  0457   WBC 10*3/mm3 8.05 9.35 9.06   HEMOGLOBIN g/dL 10.9* 11.3* 11.3*   HEMATOCRIT % 32.8* 35.2 34.9   PLATELETS 10*3/mm3 283 280 284     Results from last 7 days   Lab Units 07/28/23  0558 07/27/23  0530 07/26/23  0457 07/22/23  0412 07/21/23  1846   SODIUM mmol/L 137 140 137   < > 132*   POTASSIUM mmol/L 3.7 4.0 4.0   < > 4.3   CHLORIDE mmol/L 101 105 103   < > 100   CO2 mmol/L 23.4 22.1 21.6*   < > 20.6*   BUN mg/dL 47* 41* 37*   < > 29*   CREATININE mg/dL 1.29* 1.34* 1.18*   < > 1.13*   CALCIUM mg/dL 9.1 8.6 8.7   < > 8.7   BILIRUBIN mg/dL  --   --   --   --  0.4   ALK PHOS U/L  --   --   --   --  109   ALT (SGPT) U/L  --   --   --   --  10   AST (SGOT) U/L  --   --   --   --  18   GLUCOSE mg/dL 139* 190* 179*   < > 336*    < > = values in this interval not displayed.         Lab Results   Lab Value Date/Time    LIPASE 50 05/26/2022 2131    LIPASE 70 (H) 03/26/2022 1018    LIPASE 33 02/23/2022 0125       Radiology:  CT Abdomen Pelvis With & Without  Contrast   Final Result   1. There is a hyperenhancing mass at the pancreatic head measuring   approximately 3.6 cm and there is a 2.8 cm exophytic component or node   adjacently. The appearance is suspicious for a neuroendocrine tumor. The   mass significantly narrows the downstream portion of the common bile   duct, but there is no intrahepatic biliary dilatation and there is   pneumobilia. There is mild dilatation of the main pancreatic duct. For   tissue diagnosis, endoscopic ultrasound with FNA is recommended.   2. There are a few slightly hyperdense right renal nodules which likely   represent proteinaceous cysts. There are no enhancing or suspicious   renal lesions.       I called the patient's nurse this morning and asked her to notify the   patient's physician to read this report today morning.       This report was finalized on 7/27/2023 8:31 AM by Dr. Diane Munoz M.D.          CT Chest With Contrast Diagnostic   Final Result   1. No suspicious right hilar or pulmonary mass. Mild right basilar   atelectasis.   2. Numerous renal cortical lesions partly imaged. There is at least 1   intermediate density lesion. At this time, follow-up with pre and   postcontrast CT or MR renal mass protocol is recommended for   characterization on a nonemergent basis.   3. Small sliding hiatal hernia.       Radiation dose reduction techniques were utilized, including automated   exposure control and exposure modulation based on body size.       This report was finalized on 7/25/2023 12:49 PM by Dr. Gennaro Henley M.D.          XR Ankle 3+ View Right   Final Result      XR Knee 1 or 2 View Right   Final Result      XR Tibia Fibula 2 View Right   Final Result      XR Chest 1 View   Final Result   There is mild right lung base atelectasis versus pneumonia.   Soft tissue fullness in the region of the right hilum is also suspected.   A follow-up CT of the chest with contrast is suggested.       This report was finalized on  7/21/2023 8:02 PM by Dr. Steven Garcia M.D.              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  Pancreatic head mass noted.  SERGE has been in discussion with Dr. Alireza Borrego with [facility] to try and arrange EUS with FNA for further evaluation of pancreatic head mass on an Outpatient Basis.  Additional lab work-up including pancreatic polypeptide, proinsulin, insulin, and chromogranin A levels are still pending.  Continue current dietary regimen and supportive care measures  Additional recommendations pending scheduling EUS with FNA and additional lab work findings  Patient reports plans to be transferred to rehab for approximately 3 weeks following her hospital discharge.  SERGE discussed plan of care with patient's nurse and that patient will be having outpatient EUS with FNA with Dr. Borrego, which is to be scheduled.  We will continue to follow.    I discussed the patients findings and my recommendations with patient and nursing staff.    SERGE Celestin APRN  [facility] [place] [id number]  Office: [phone]

## 2023-07-28 NOTE — PROGRESS NOTES
REASON FOR FOLLOW-UP: Pancreatic mass      Interval history:  Patient having no abdominal pain, stable chronic diarrhea.  Evaluated by GI and potential outpatient endoscopic ultrasound and biopsy of the pancreatic mass planned.  Patient anticipates discharge to [facility]U.    HISTORY OF PRESENT ILLNESS:   This is a pleasant 83-year-old woman with type 2 diabetes, hypothyroidism, hypertension, stage IIIb CKD, anxiety disorder initially admitted on 7/21/2023 for generalized weakness and difficulty ambulating about her house.  She had a chest x-ray performed on 7/21/2023 which showed possible soft tissue in the right hilum and a follow-up CT chest was performed 7/24/2023 showing numerous mediastinal and right hilar lymph nodes predominantly calcified favored to represent prior granulomatous disease.  Visualized upper abdomen showed pneumobilia, calcified splenic granulomas, bilateral renal cortical lesions, intermediate density lesion upper pole of the right kidney 2 cm, small hyperdense lesion midpole of the kidney for which a CT pre and postcontrast renal protocol was recommended.  A CT abdomen/pelvis with and without contrast was performed on 7/26/23 which showed a slightly hyperdense slightly lobular partially exophytic nodule upper pole the right kidney with no postcontrast enhancement and a subcentimeter hyperdense nodule immediately stable in size.  There were no enhancing renal lesions.  Urinary bladder was thickened but nearly completely collapsed.  In the pancreatic head there was mild enhancement of a suspected mass 3.6 cm in diameter narrowing the common bile duct and a 2.8 x 2.2 cm lymph node or exophytic component inferiorly with mild dilation of the pancreatic duct.  The mass was suspicious for a neuroendocrine tumor radiographically.     The patient reports chronic diarrhea over the previous year or so but no weight loss or abdominal pain.  Blood sugars have been running elevated.  She denies facial flushing  or hypoglycemic events.      Past Medical History, Past Surgical History, Social History, Family History have been reviewed and are without significant changes except as mentioned.    Review of Systems   Constitutional:  Positive for activity change and fatigue. Negative for fever.   Gastrointestinal:  Positive for diarrhea. Negative for abdominal pain.   Musculoskeletal:  Positive for arthralgias and gait problem.   Neurological:  Positive for weakness.        Medications:  The current medication list was reviewed in the EMR    ALLERGIES:    Allergies   Allergen Reactions    Sulfa Antibiotics Unknown - High Severity     Pt says it was a long time ago and I don't remember but it wasn't good.        Objective      Vitals:    07/28/23 0954   BP: 159/66   Pulse: 57   Resp: 18   Temp: 97.3 øF (36.3 øC)   SpO2: 97%          Physical Exam    CONSTITUTIONAL: pleasant well-developed adult woman  HEENT: no icterus, no thrush, moist membranes  LYMPH: no cervical or supraclavicular lad  CV: RRR, S1S2, no murmur  RESP: cta bilat, no wheezing, no rales  GI: soft, non-tender, no splenomegaly, +bs  MUSC: LE wrapped bilat  NEURO: alert and oriented x3   PSYCH: normal mood and affect  Exam is unchanged-7/28/2023  RECENT LABS:  Hematology Results from last 7 days   Lab Units 07/28/23  0558 07/27/23  0530 07/26/23  0457   WBC 10*3/mm3 8.05 9.35 9.06   HEMOGLOBIN g/dL 10.9* 11.3* 11.3*   HEMATOCRIT % 32.8* 35.2 34.9   PLATELETS 10*3/mm3 283 280 284     2  Lab Results   Component Value Date    GLUCOSE 139 (H) 07/28/2023    BUN 47 (H) 07/28/2023    CREATININE 1.29 (H) 07/28/2023    EGFRIFNONA 51 (L) 02/28/2022    EGFRIFAFRI 50 (L) 01/18/2021    BCR 36.4 (H) 07/28/2023    CO2 23.4 07/28/2023    CALCIUM 9.1 07/28/2023    PROTENTOTREF 7.2 06/02/2023    ALBUMIN 3.9 07/21/2023    LABIL2 1.3 06/02/2023    AST 18 07/21/2023    ALT 10 07/21/2023       Lab Results   Component Value Date    IRON 40 07/27/2023    TIBC 364 07/27/2023    FERRITIN  32.20 07/27/2023       Lab Results   Component Value Date    TQVUMFGT58 572 07/27/2023       Lab Results   Component Value Date    FOLATE 10.90 07/27/2023      CT abdomen/pelvis 7/26/2023:  IMPRESSION:  1. There is a hyperenhancing mass at the pancreatic head measuring  approximately 3.6 cm and there is a 2.8 cm exophytic component or node  adjacently. The appearance is suspicious for a neuroendocrine tumor. The  mass significantly narrows the downstream portion of the common bile  duct, but there is no intrahepatic biliary dilatation and there is  pneumobilia. There is mild dilatation of the main pancreatic duct. For  tissue diagnosis, endoscopic ultrasound with FNA is recommended.  2. There are a few slightly hyperdense right renal nodules which likely  represent proteinaceous cysts. There are no enhancing or suspicious  renal lesions.  I personally viewed the CT abdomen-there is a 3.5 cm mass in the head of the pancreas and adjacent 2.8 cm lesion no obvious liver metastases    Assessment & Plan   *Hyperenhancing pancreatic head mass 3.6 cm with an adjacent 2.8 cm exophytic component or lymph node suspicious for NET  CEA 3.09, CA 19-9 44.3  Chromogranin A pending, VIP pending, somatostatin pending, gastrin pending, glucagon pending  GI consulted for endoscopic ultrasound and biopsy-may be outpatient     *Chronic diarrhea x1 year, hyperglycemia (but diabetic)     *Normocytic, normochromic anemia-[id number] likely secondary to CKD 3  B12 572, folate 10.9, ferritin 32, iron sat 11%     *Comorbidities include venous insufficiency, hypertension, hyperlipidemia, CKD, hypothyroidism, advanced age-PS ECOG 2     Oncology plan/recommendations:  Lab studies for a functional pancreatic neuroendocrine tumor pending  Patient will need biopsy of the pancreatic mass-likely by endoscopic ultrasound; GI trying to arrange the procedure and may be outpatient  Begin oral iron for low iron stores  If the patient is discharged to  SNU I can follow-up results of her biopsy and lab data outpatient.  Thank you for allowing me to participate in the care of this pleasant patient.  Please call if further needed during the hospital stay.                  7/28/2023      CC:

## 2023-07-28 NOTE — PROGRESS NOTES
Continued Stay Note  [facility]     Patient Name: Halie Guidry  MRN: [id number]  Today's Date: 7/28/2023    Admit Date: 7/21/2023    Plan: [facility] PENDING Pre-cert   Discharge Plan       Row Name 07/28/23 1315       Plan    Plan [facility] PENDING Pre-cert    Plan Comments Msg sent to [facility] Post Acute Authorizations <[email or web address] to begin pre-cert to [facility]. Updated Mame with [facility].                   Discharge Codes    No documentation.                 Expected Discharge Date and Time       Expected Discharge Date Expected Discharge Time    Jul 29, 2023               Vannessa Mcdowell RN

## 2023-07-29 LAB
ANION GAP SERPL CALCULATED.3IONS-SCNC: 11.9 MMOL/L (ref 5–15)
BASOPHILS # BLD AUTO: 0.09 10*3/MM3 (ref 0–0.2)
BASOPHILS NFR BLD AUTO: 1 % (ref 0–1.5)
BUN SERPL-MCNC: 47 MG/DL (ref 8–23)
BUN/CREAT SERPL: 40.2 (ref 7–25)
CALCIUM SPEC-SCNC: 8.7 MG/DL (ref 8.6–10.5)
CHLORIDE SERPL-SCNC: 104 MMOL/L (ref 98–107)
CO2 SERPL-SCNC: 21.1 MMOL/L (ref 22–29)
CREAT SERPL-MCNC: 1.17 MG/DL (ref 0.57–1)
DEPRECATED RDW RBC AUTO: 41.5 FL (ref 37–54)
EGFRCR SERPLBLD CKD-EPI 2021: 46.4 ML/MIN/1.73
EOSINOPHIL # BLD AUTO: 0.35 10*3/MM3 (ref 0–0.4)
EOSINOPHIL NFR BLD AUTO: 3.9 % (ref 0.3–6.2)
ERYTHROCYTE [DISTWIDTH] IN BLOOD BY AUTOMATED COUNT: 13.8 % (ref 12.3–15.4)
GLUCOSE BLDC GLUCOMTR-MCNC: 139 MG/DL (ref 70–130)
GLUCOSE BLDC GLUCOMTR-MCNC: 143 MG/DL (ref 70–130)
GLUCOSE BLDC GLUCOMTR-MCNC: 164 MG/DL (ref 70–130)
GLUCOSE BLDC GLUCOMTR-MCNC: 218 MG/DL (ref 70–130)
GLUCOSE SERPL-MCNC: 128 MG/DL (ref 65–99)
HCT VFR BLD AUTO: 33.8 % (ref 34–46.6)
HGB BLD-MCNC: 11.1 G/DL (ref 12–15.9)
IMM GRANULOCYTES # BLD AUTO: 0.04 10*3/MM3 (ref 0–0.05)
IMM GRANULOCYTES NFR BLD AUTO: 0.4 % (ref 0–0.5)
INSULIN SERPL-ACNC: 7.3 UIU/ML (ref 2.6–24.9)
LYMPHOCYTES # BLD AUTO: 1.84 10*3/MM3 (ref 0.7–3.1)
LYMPHOCYTES NFR BLD AUTO: 20.2 % (ref 19.6–45.3)
MCH RBC QN AUTO: 27.4 PG (ref 26.6–33)
MCHC RBC AUTO-ENTMCNC: 32.8 G/DL (ref 31.5–35.7)
MCV RBC AUTO: 83.5 FL (ref 79–97)
MONOCYTES # BLD AUTO: 0.79 10*3/MM3 (ref 0.1–0.9)
MONOCYTES NFR BLD AUTO: 8.7 % (ref 5–12)
NEUTROPHILS NFR BLD AUTO: 5.98 10*3/MM3 (ref 1.7–7)
NEUTROPHILS NFR BLD AUTO: 65.8 % (ref 42.7–76)
NRBC BLD AUTO-RTO: 0 /100 WBC (ref 0–0.2)
PLATELET # BLD AUTO: 293 10*3/MM3 (ref 140–450)
PMV BLD AUTO: 9.5 FL (ref 6–12)
POTASSIUM SERPL-SCNC: 4 MMOL/L (ref 3.5–5.2)
RBC # BLD AUTO: 4.05 10*6/MM3 (ref 3.77–5.28)
SODIUM SERPL-SCNC: 137 MMOL/L (ref 136–145)
WBC NRBC COR # BLD: 9.09 10*3/MM3 (ref 3.4–10.8)

## 2023-07-29 PROCEDURE — 82948 REAGENT STRIP/BLOOD GLUCOSE: CPT

## 2023-07-29 PROCEDURE — 97110 THERAPEUTIC EXERCISES: CPT

## 2023-07-29 PROCEDURE — 97530 THERAPEUTIC ACTIVITIES: CPT

## 2023-07-29 PROCEDURE — 63710000001 INSULIN LISPRO (HUMAN) PER 5 UNITS: Performed by: HOSPITALIST

## 2023-07-29 PROCEDURE — 63710000001 INSULIN LISPRO (HUMAN) PER 5 UNITS: Performed by: NURSE PRACTITIONER

## 2023-07-29 PROCEDURE — 80048 BASIC METABOLIC PNL TOTAL CA: CPT | Performed by: HOSPITALIST

## 2023-07-29 PROCEDURE — 63710000001 INSULIN GLARGINE PER 5 UNITS: Performed by: INTERNAL MEDICINE

## 2023-07-29 PROCEDURE — 99232 SBSQ HOSP IP/OBS MODERATE 35: CPT | Performed by: INTERNAL MEDICINE

## 2023-07-29 PROCEDURE — 85025 COMPLETE CBC W/AUTO DIFF WBC: CPT | Performed by: INTERNAL MEDICINE

## 2023-07-29 RX ORDER — HYDROCODONE BITARTRATE AND ACETAMINOPHEN 5; 325 MG/1; MG/1
1 TABLET ORAL EVERY 6 HOURS PRN
Status: DISCONTINUED | OUTPATIENT
Start: 2023-07-29 | End: 2023-07-31 | Stop reason: HOSPADM

## 2023-07-29 RX ADMIN — PREGABALIN 50 MG: 50 CAPSULE ORAL at 20:45

## 2023-07-29 RX ADMIN — DULOXETINE HYDROCHLORIDE 30 MG: 30 CAPSULE, DELAYED RELEASE ORAL at 10:03

## 2023-07-29 RX ADMIN — INSULIN LISPRO 18 UNITS: 100 INJECTION, SOLUTION INTRAVENOUS; SUBCUTANEOUS at 17:40

## 2023-07-29 RX ADMIN — LISINOPRIL 40 MG: 40 TABLET ORAL at 10:05

## 2023-07-29 RX ADMIN — INSULIN LISPRO 3 UNITS: 100 INJECTION, SOLUTION INTRAVENOUS; SUBCUTANEOUS at 13:38

## 2023-07-29 RX ADMIN — PREGABALIN 50 MG: 50 CAPSULE ORAL at 10:04

## 2023-07-29 RX ADMIN — INSULIN LISPRO 18 UNITS: 100 INJECTION, SOLUTION INTRAVENOUS; SUBCUTANEOUS at 10:06

## 2023-07-29 RX ADMIN — SENNOSIDES AND DOCUSATE SODIUM 2 TABLET: 50; 8.6 TABLET ORAL at 20:45

## 2023-07-29 RX ADMIN — FERROUS SULFATE TAB 325 MG (65 MG ELEMENTAL FE) 325 MG: 325 (65 FE) TAB at 10:07

## 2023-07-29 RX ADMIN — CHLORTHALIDONE 25 MG: 25 TABLET ORAL at 10:06

## 2023-07-29 RX ADMIN — Medication 1000 MCG: at 10:03

## 2023-07-29 RX ADMIN — TERAZOSIN 1 MG: 1 CAPSULE ORAL at 22:22

## 2023-07-29 RX ADMIN — ZINC OXIDE 1 APPLICATION: 200 OINTMENT TOPICAL at 10:07

## 2023-07-29 RX ADMIN — ATORVASTATIN CALCIUM 80 MG: 20 TABLET, FILM COATED ORAL at 20:45

## 2023-07-29 RX ADMIN — HYDROCODONE BITARTRATE AND ACETAMINOPHEN 1 TABLET: 5; 325 TABLET ORAL at 06:07

## 2023-07-29 RX ADMIN — Medication 10 ML: at 13:40

## 2023-07-29 RX ADMIN — BISOPROLOL FUMARATE 5 MG: 5 TABLET, FILM COATED ORAL at 10:06

## 2023-07-29 RX ADMIN — SENNOSIDES AND DOCUSATE SODIUM 2 TABLET: 50; 8.6 TABLET ORAL at 13:40

## 2023-07-29 RX ADMIN — HYDROCODONE BITARTRATE AND ACETAMINOPHEN 1 TABLET: 5; 325 TABLET ORAL at 20:45

## 2023-07-29 RX ADMIN — INSULIN LISPRO 5 UNITS: 100 INJECTION, SOLUTION INTRAVENOUS; SUBCUTANEOUS at 17:41

## 2023-07-29 RX ADMIN — ZINC OXIDE 1 APPLICATION: 200 OINTMENT TOPICAL at 20:49

## 2023-07-29 RX ADMIN — AMLODIPINE BESYLATE 5 MG: 5 TABLET ORAL at 10:05

## 2023-07-29 RX ADMIN — INSULIN LISPRO 18 UNITS: 100 INJECTION, SOLUTION INTRAVENOUS; SUBCUTANEOUS at 13:38

## 2023-07-29 RX ADMIN — PANTOPRAZOLE SODIUM 40 MG: 40 TABLET, DELAYED RELEASE ORAL at 06:07

## 2023-07-29 RX ADMIN — PREGABALIN 50 MG: 50 CAPSULE ORAL at 16:06

## 2023-07-29 RX ADMIN — Medication 10 ML: at 22:22

## 2023-07-29 RX ADMIN — LEVOTHYROXINE SODIUM 112 MCG: 0.11 TABLET ORAL at 10:07

## 2023-07-29 RX ADMIN — HYDROCODONE BITARTRATE AND ACETAMINOPHEN 1 TABLET: 5; 325 TABLET ORAL at 16:06

## 2023-07-29 RX ADMIN — INSULIN GLARGINE 65 UNITS: 100 INJECTION, SOLUTION SUBCUTANEOUS at 22:22

## 2023-07-29 NOTE — PROGRESS NOTES
Continued Stay Note  [facility]     Patient Name: Halie Guidry  MRN: [id number]  Today's Date: 7/29/2023    Admit Date: 7/21/2023    Plan: Awaiting pre-cert for [facility], remains pending at this time.......Arturo RN   Discharge Plan       Row Name 07/29/23 1158       Plan    Plan Awaiting pre-cert for [facility], remains pending at this time........Aden RN    Plan Comments Inbound call from nursing unit inquiring about pre-cert for rehab. S/W [facility]/[employer], states pending in their system. Per [employer] website auth pending. Will continue to follow. Update to RN......Aden RN                   Discharge Codes    No documentation.                 Expected Discharge Date and Time       Expected Discharge Date Expected Discharge Time    Jul 29, 2023               Kenya Melendez, RN

## 2023-07-29 NOTE — PROGRESS NOTES
[facility]  Inpatient Progress Note    Reason for Follow Up: Pancreatic head mass    Subjective     Interval History:   No overnight events, we discussed possible diagnoses, diagnostic evaluation with EUS    Current Facility-Administered Medications:     amLODIPine (NORVASC) tablet 5 mg, 5 mg, Oral, Daily, Yanet Bartlett APRN, 5 mg at 07/29/23 1005    atorvastatin (LIPITOR) tablet 80 mg, 80 mg, Oral, Nightly, Yanet Bartlett APRN, 80 mg at 07/28/23 2117    sennosides-docusate (PERICOLACE) 8.6-50 MG per tablet 2 tablet, 2 tablet, Oral, BID, 2 tablet at 07/25/23 0925 **AND** polyethylene glycol (MIRALAX) packet 17 g, 17 g, Oral, Daily PRN, 17 g at 07/24/23 2144 **AND** bisacodyl (DULCOLAX) EC tablet 5 mg, 5 mg, Oral, Daily PRN **AND** bisacodyl (DULCOLAX) suppository 10 mg, 10 mg, Rectal, Daily PRN, Yanet Bartlett APRN    bisoprolol (ZEBeta) tablet 5 mg, 5 mg, Oral, Daily, Yanet Bartlett APRN, 5 mg at 07/29/23 1006    chlorthalidone (HYGROTON) tablet 25 mg, 25 mg, Oral, Daily, Yanet Bartlett APRN, 25 mg at 07/29/23 1006    dextrose (D50W) (25 g/50 mL) IV injection 25 g, 25 g, Intravenous, Q15 Min PRN, Yanet Bartlett APRN    dextrose (GLUTOSE) oral gel 15 g, 15 g, Oral, Q15 Min PRN, Yanet Bartlett APRN    DULoxetine (CYMBALTA) DR capsule 30 mg, 30 mg, Oral, Daily, Yanet Bartlett APRN, 30 mg at 07/29/23 1003    ferrous sulfate tablet 325 mg, 325 mg, Oral, Daily With Breakfast, Napoleon Gutierrez MD, 325 mg at 07/29/23 1007    glucagon (GLUCAGEN) injection 1 mg, 1 mg, Intramuscular, Q15 Min PRN, Yanet Bartlett, SERGE    hydrocortisone-bacitracin-zinc oxide-nystatin (MAGIC BARRIER) ointment 1 application , 1 application , Topical, BID, Efe Moses MD, 1 application  at 07/29/23 1007    insulin glargine (LANTUS, SEMGLEE) injection 65 Units, 65 Units, Subcutaneous, Nightly, Kelechi Sanchez MD, 65 Units at  07/28/23 2129    insulin lispro (HUMALOG/ADMELOG) injection 18 Units, 18 Units, Subcutaneous, TID With Meals, Efe Moses MD, 18 Units at 07/29/23 1006    insulin lispro (HUMALOG/ADMELOG) injection 3-14 Units, 3-14 Units, Subcutaneous, 4x Daily With Meals & Nightly, Yanet Bartlett APRN, 3 Units at 07/28/23 1212    levothyroxine (SYNTHROID, LEVOTHROID) tablet 112 mcg, 112 mcg, Oral, Once per day on Mon Tue Wed Thu Fri Sat, Yanet Bartlett APRN, 112 mcg at 07/29/23 1007    levothyroxine (SYNTHROID, LEVOTHROID) tablet 168 mcg, 168 mcg, Oral, Once per day on Sun, Yanet Bartlett APRN, 168 mcg at 07/23/23 0600    lisinopril (PRINIVIL,ZESTRIL) tablet 40 mg, 40 mg, Oral, Daily, Yanet Bartlett APRN, 40 mg at 07/29/23 1005    melatonin tablet 5 mg, 5 mg, Oral, Nightly PRN, Ceci Ram APRN, 5 mg at 07/27/23 2221    pantoprazole (PROTONIX) EC tablet 40 mg, 40 mg, Oral, Q AM, Yanet Bartlett, APRN, 40 mg at 07/29/23 0607    pregabalin (LYRICA) capsule 50 mg, 50 mg, Oral, TID, Yanet Bartlett APRN, 50 mg at 07/29/23 1004    sodium chloride 0.9 % flush 10 mL, 10 mL, Intravenous, Q12H, Yanet Bartlett APRN, 10 mL at 07/28/23 2118    sodium chloride 0.9 % flush 10 mL, 10 mL, Intravenous, PRN, Yanet Bartlett, SERGE    sodium chloride 0.9 % infusion 40 mL, 40 mL, Intravenous, PRN, Yanet Bartlett, APRN    terazosin (HYTRIN) capsule 1 mg, 1 mg, Oral, Nightly, Yanet Bartlett APRN, 1 mg at 07/28/23 2118    vitamin B-12 (CYANOCOBALAMIN) tablet 1,000 mcg, 1,000 mcg, Oral, Daily, Yanet Bartlett APRN, 1,000 mcg at 07/29/23 1003  Review of Systems:    There is weakness of fatigue all the systems reviewed and negative    Objective     Vital Signs  Temp:  [96.5 øF (35.8 øC)-98.6 øF (37 øC)] 98.6 øF (37 øC)  Heart Rate:  [52-56] 55  Resp:  [16-18] 16  BP: (135-153)/(63-66) 145/65  Body mass index is 31.21 kg/mý.    Intake/Output Summary  (Last 24 hours) at 7/29/2023 1131  Last data filed at 7/29/2023 0955  Gross per 24 hour   Intake 960 ml   Output 2600 ml   Net -1640 ml     I/O this shift:  In: 480 [P.O.:480]  Out: 700 [Urine:700]     Physical Exam:   General: patient awake, alert and cooperative   Eyes: Normal lids and lashes, no scleral icterus   Neck: supple, normal ROM   Skin: warm and dry, not jaundiced   Cardiovascular: regular rhythm and rate, no murmurs auscultated   Pulm: clear to auscultation bilaterally, regular and unlabored   Abdomen: soft, nontender, nondistended; normal bowel sounds   Extremities: no rash or edema   Psychiatric: Normal mood and behavior; memory intact     Results Review:     I reviewed the patient's new clinical results.    Results from last 7 days   Lab Units 07/29/23  0432 07/28/23  0558 07/27/23  0530   WBC 10*3/mm3 9.09 8.05 9.35   HEMOGLOBIN g/dL 11.1* 10.9* 11.3*   HEMATOCRIT % 33.8* 32.8* 35.2   PLATELETS 10*3/mm3 293 283 280     Results from last 7 days   Lab Units 07/29/23  0432 07/28/23  0558 07/27/23  0530   SODIUM mmol/L 137 137 140   POTASSIUM mmol/L 4.0 3.7 4.0   CHLORIDE mmol/L 104 101 105   CO2 mmol/L 21.1* 23.4 22.1   BUN mg/dL 47* 47* 41*   CREATININE mg/dL 1.17* 1.29* 1.34*   CALCIUM mg/dL 8.7 9.1 8.6   GLUCOSE mg/dL 128* 139* 190*         Lab Results   Lab Value Date/Time    LIPASE 50 05/26/2022 2131    LIPASE 70 (H) 03/26/2022 1018    LIPASE 33 02/23/2022 0125       Radiology:  CT Abdomen Pelvis With & Without Contrast   Final Result   1. There is a hyperenhancing mass at the pancreatic head measuring   approximately 3.6 cm and there is a 2.8 cm exophytic component or node   adjacently. The appearance is suspicious for a neuroendocrine tumor. The   mass significantly narrows the downstream portion of the common bile   duct, but there is no intrahepatic biliary dilatation and there is   pneumobilia. There is mild dilatation of the main pancreatic duct. For   tissue diagnosis, endoscopic ultrasound  with FNA is recommended.   2. There are a few slightly hyperdense right renal nodules which likely   represent proteinaceous cysts. There are no enhancing or suspicious   renal lesions.       I called the patient's nurse this morning and asked her to notify the   patient's physician to read this report today morning.       This report was finalized on 7/27/2023 8:31 AM by Dr. Diane Munoz M.D.          CT Chest With Contrast Diagnostic   Final Result   1. No suspicious right hilar or pulmonary mass. Mild right basilar   atelectasis.   2. Numerous renal cortical lesions partly imaged. There is at least 1   intermediate density lesion. At this time, follow-up with pre and   postcontrast CT or MR renal mass protocol is recommended for   characterization on a nonemergent basis.   3. Small sliding hiatal hernia.       Radiation dose reduction techniques were utilized, including automated   exposure control and exposure modulation based on body size.       This report was finalized on 7/25/2023 12:49 PM by Dr. Gennaro Henley M.D.          XR Ankle 3+ View Right   Final Result      XR Knee 1 or 2 View Right   Final Result      XR Tibia Fibula 2 View Right   Final Result      XR Chest 1 View   Final Result   There is mild right lung base atelectasis versus pneumonia.   Soft tissue fullness in the region of the right hilum is also suspected.   A follow-up CT of the chest with contrast is suggested.       This report was finalized on 7/21/2023 8:02 PM by Dr. Steven Garcia M.D.              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  Pancreatic head mass noted.  APRN has been in discussion with Dr. Alireza Borrego with [facility] to try and arrange EUS with FNA for further evaluation of pancreatic head mass on an Outpatient Basis.  The biliary specialist is in agreement.  Additional lab work-up including pancreatic polypeptide, proinsulin, insulin, and chromogranin A levels are still pending.  Continue current dietary regimen and supportive care measures  Additional recommendations pending scheduling EUS   Patient reports plans to be transferred to rehab for approximately 3 weeks following her hospital discharge.  She is aware she needs to have EUS scheduled after rehab  APRN discussed plan of care with patient's nurse and that patient will be having outpatient EUS with FNA with Dr. Borrego, which is to be scheduled.  He will perform the EUS and he will get it scheduled  We will continue to follow.  I discussed the patients findings and my recommendations with patient and nursing staff.    Josse Oropeza MD

## 2023-07-29 NOTE — CASE MANAGEMENT/SOCIAL WORK
Continued Stay Note  [facility]     Patient Name: Halie Guidry  MRN: [id number]  Today's Date: 7/29/2023    Admit Date: 7/21/2023    Plan: precert approved for [facility]   Discharge Plan       Row Name 07/29/23 1631       Plan    Plan precert approved for [facility]    Patient/Family in Agreement with Plan yes    Plan Comments Contacted by [facility] re: precert--pt accepted for SNF 7/29-8/1/23. Next review date is 8/1/23. Updated RN........JW                   Discharge Codes    No documentation.                 Expected Discharge Date and Time       Expected Discharge Date Expected Discharge Time    Jul 29, 2023               Meena Haddad RN

## 2023-07-29 NOTE — THERAPY TREATMENT NOTE
Patient Name: Halie Guidry  : 1940    MRN: [id number]                              Today's Date: 2023       Admit Date: 2023    Visit Dx:     ICD-10-CM ICD-9-CM   1. Bilateral leg pain  M79.604 729.5    M79.605    2. Unable to ambulate  R26.2 719.7   3. Peripheral edema  R60.9 782.3     Patient Active Problem List   Diagnosis    Compression fracture    Lumbar degenerative disc disease    Type 2 diabetes mellitus with hyperglycemia, with long-term current use of insulin    Hyperlipidemia    Benign essential hypertension    Primary hypothyroidism    Leukocytosis    Neuropathy    Osteoporosis    Proteinuria    Tobacco abuse    Diabetic eye exam    Generalized weakness    Spinal stenosis    Scoliosis    Arthritis    VBI (vertebrobasilar insufficiency)    Orthostatic hypotension    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    Severe hypothyroidism    Noncompliance with medication regimen    Vitamin D deficiency disease    Altered mental status    Tremor    Seizure    Stage 3a chronic kidney disease    Thoracic degenerative disc disease    Metabolic encephalopathy    VIPUL (acute kidney injury)    Hyperglycemia    Type II diabetes mellitus, uncontrolled    Lower abdominal pain    Transaminitis    COVID-19 virus detected    UTI (urinary tract infection)    Emphysematous cystitis    Choledocholithiasis    Hypokalemia    Hypoxia    Generalized abdominal pain    History of Clostridium difficile infection    History of ERCP    Acute UTI (urinary tract infection)    Hypomagnesemia    Burning pain    Anxiety disorder    Neuropathic pain    Intertrigo    Weakness of both lower extremities    Accelerated hypertension    Acute cystitis without hematuria    Altered mental status, unspecified altered mental status type    Left lower lobe pneumonia    Acute pain of left foot    Cellulitis and abscess of left lower extremity    Hypertensive kidney disease with stage 3a chronic kidney disease     Bilateral leg pain     Past Medical History:   Diagnosis Date    Acute metabolic encephalopathy 6/24/2022    Anxiety     Arthritis     Benign essential hypertension 08/20/2014    Bleeding disorder     Depression     Diabetes     Diabetes mellitus, type 2     Disc degeneration, lumbar     Headache, tension-type     Hyperlipidemia     Hypothyroidism     Neuropathy     Osteoporosis 09/09/2015    Peripheral neuropathy     Rotator cuff tear, left     Scoliosis     Shoulder pain     LEFT, TORN ROTATOR CUFF S/P FALL    Spinal stenosis      Past Surgical History:   Procedure Laterality Date    APPENDECTOMY      BILATERAL BREAST REDUCTION Bilateral 08/2015    CATARACT EXTRACTION  03/2015    CHOLECYSTECTOMY WITH INTRAOPERATIVE CHOLANGIOGRAM N/A 3/27/2022    Procedure: CHOLECYSTECTOMY LAPAROSCOPIC INTRAOPERATIVE CHOLANGIOGRAM;  Surgeon: Aiyana Hill MD;  Location: [facility] MAIN OR;  Service: General;  Laterality: N/A;    COLONOSCOPY  06/05/2015    WNL    ERCP N/A 2/25/2022    Procedure: ENDOSCOPIC RETROGRADE CHOLANGIOPANCREATOGRAPHY with sphincterotomy and balloon sweep;  Surgeon: Chilo Wilhelm MD;  Location: [facility] ENDOSCOPY;  Service: Gastroenterology;  Laterality: N/A;  PRE/POST - CBD stones    ERCP N/A 3/28/2022    Procedure: ENDOSCOPIC RETROGRADE CHOLANGIOPANCREATOGRAPHY WITH SPHINCTEROTOMY AND BALLOON SWEEP;  Surgeon: Chilo Wilhelm MD;  Location: [facility] ENDOSCOPY;  Service: Gastroenterology;  Laterality: N/A;  PRE: COMMON DUCT STONE  POST: COMMON DUCT STONE    KYPHOPLASTY      REDUCTION MAMMAPLASTY      TONSILLECTOMY        General Information       Row Name 07/29/23 1529          Physical Therapy Time and Intention    Document Type therapy note (daily note)  -MG     Mode of Treatment individual therapy;physical therapy  -MG       Row Name 07/29/23 1529          General Information    Patient Profile Reviewed yes  -MG     Existing Precautions/Restrictions fall  -MG       Row Name 07/29/23 1529           Cognition    Orientation Status (Cognition) oriented x 4  -MG       Row Name 07/29/23 1529          Safety Issues, Functional Mobility    Impairments Affecting Function (Mobility) balance;endurance/activity tolerance;strength;postural/trunk control;pain  -MG     Comment, Safety Issues/Impairments (Mobility) Gait belt and non-skid socks donned.  -MG               User Key  (r) = Recorded By, (t) = Taken By, (c) = Cosigned By      Initials Name Provider Type    MG Melani Moctezuma, PT Physical Therapist                   Mobility       Row Name 07/29/23 1529          Bed Mobility    Supine-Sit [place] (Bed Mobility) contact guard  -MG     Sit-Supine [place] (Bed Mobility) standby assist  -MG     Assistive Device (Bed Mobility) bed rails;head of bed elevated  -MG     Comment, (Bed Mobility) Increased time to complete. Pt performed L lateral scoots along EOB to HOB primarily pushing through BUEs and LLE, partially through RLE x4-5 w/ SBA/CGA for sitting balance. Pt tsf'd to long sitting and able to scoot bwd additional 2x to get further up in bed.  -MG       Row Name 07/29/23 1529          Sit-Stand Transfer    Sit-Stand [place] (Transfers) moderate assist (50% patient effort);maximum assist (25% patient effort);verbal cues  -MG     Assistive Device (Sit-Stand Transfers) walker, front-wheeled  -MG     Comment, (Sit-Stand Transfer) 2x. First attempt was ModA with pt able to clear her bottom and get both UEs on RW, but unable to stand fully upright or weight shift onto RLE. Second stand pt was able to clear her bottom and get LUE on RW, but unable to raise RUE and unable to weight shift onto RLE or stand upright despite this PT giving strong MaxA. Elevated bed height.  -MG       Row Name 07/29/23 1529          Mobility    Extremity Weight-bearing Status left lower extremity;right lower extremity  -MG     Left Lower Extremity (Weight-bearing Status) weight-bearing as tolerated (WBAT)  -MG     Right Lower  Extremity (Weight-bearing Status) weight-bearing as tolerated (WBAT)  -MG               User Key  (r) = Recorded By, (t) = Taken By, (c) = Cosigned By      Initials Name Provider Type    Melani Barbosa PT Physical Therapist                   Obj/Interventions       Row Name 07/29/23 1538          Motor Skills    Therapeutic Exercise other (see comments)  AP, LAQ w/ 1-2s hold in full extension, HF x15.  -MG       Row Name 07/29/23 1538          Balance    Static Sitting Balance modified independence  -MG     Dynamic Sitting Balance standby assist  -MG     Position, Sitting Balance sitting edge of bed;unsupported  -MG     Static Standing Balance maximum assist  -MG     Position/Device Used, Standing Balance supported;walker, front-wheeled  -MG     Comment, Balance Pt sat EOB ~15-20 min performing LE ther-ex, STS and cross-body reaching x5. No LOB or leaning noted with sitting balance. Pt very unsteady with standing and unable to stand upright due to RLE pain.  -MG               User Key  (r) = Recorded By, (t) = Taken By, (c) = Cosigned By      Initials Name Provider Type    Melani Barbosa PT Physical Therapist                   Goals/Plan    No documentation.                  Clinical Impression       Row Name 07/29/23 1539          Pain    Pretreatment Pain Rating 8/10  -MG     Posttreatment Pain Rating 9/10  -MG     Pain Location - Side/Orientation Right  -MG     Pain Location lower  -MG     Pain Location - extremity  -MG     Pain Intervention(s) Medication (See MAR);Ambulation/increased activity;Repositioned;Rest  -MG       Row Name 07/29/23 1539          Plan of Care Review    Plan of Care Reviewed With patient  -MG     Progress no change  -MG     Outcome Evaluation Pt seen for PT tx this afternoon with noted progression of bed mobility, but had increased difficulty with coming to stand. Pt was SBA/CGA for bed mobility with increased time and the HOB elevated. Pt performed LE ther-ex while seated EOB  prior to two STS attempts from an elevated bed where pt required Mod-MaxA and was unable to tolerate standing longer than 5-7s each. Pt with noted difficulty standing upright and weight shifting or weight bearing on RLE due to increased pain. Pt performed lateral scoots along EOB primarily pushing through her BUEs. Encouraged pt to perform LE ther-ex while supine in bed multiple times a day to maintain strength. PT will cont to follow pt to progress her functional mobility as tolerated.  -MG       Row Name 07/29/23 1539          Therapy Assessment/Plan (PT)    Rehab Potential (PT) fair, will monitor progress closely  -MG     Criteria for Skilled Interventions Met (PT) yes  -MG     Therapy Frequency (PT) 5 times/wk  -MG       Row Name 07/29/23 1539          Vital Signs    O2 Delivery Pre Treatment room air  -MG     O2 Delivery Intra Treatment room air  -MG     O2 Delivery Post Treatment room air  -MG     Pre Patient Position Supine  -MG     Intra Patient Position Standing  -MG     Post Patient Position Supine  -MG       Row Name 07/29/23 1539          Positioning and Restraints    Pre-Treatment Position in bed  -MG     Post Treatment Position bed  -MG     In Bed notified nsg;supine;fowlers;call light within reach;encouraged to call for assist;exit alarm on;side rails up x3;RLE elevated  Declined SCDs  -MG               User Key  (r) = Recorded By, (t) = Taken By, (c) = Cosigned By      Initials Name Provider Type    Melani Barbosa, PT Physical Therapist                   Outcome Measures       Row Name 07/29/23 1544          How much help from another person do you currently need...    Turning from your back to your side while in flat bed without using bedrails? 3  -MG     Moving from lying on back to sitting on the side of a flat bed without bedrails? 3  -MG     Moving to and from a bed to a chair (including a wheelchair)? 1  -MG     Standing up from a chair using your arms (e.g., wheelchair, bedside chair)? 2   -MG     Climbing 3-5 steps with a railing? 1  -MG     To walk in hospital room? 1  -MG     AM-PAC 6 Clicks Score (PT) 11  -MG     Highest level of mobility 4 --> Transferred to chair/commode  -MG               User Key  (r) = Recorded By, (t) = Taken By, (c) = Cosigned By      Initials Name Provider Type    MG Melani Moctezuma, PT Physical Therapist                                 Physical Therapy Education       Title: PT OT SLP Therapies (In Progress)       Topic: Physical Therapy (Done)       Point: Mobility training (Done)       Learning Progress Summary             Patient Acceptance, E,D, VU,NR by  at 7/29/2023 1544    Acceptance, E,TB,D, VU by  at 7/27/2023 1615    Acceptance, E, VU,NR by  at 7/24/2023 1445                         Point: Home exercise program (Done)       Learning Progress Summary             Patient Acceptance, E,D, VU,NR by  at 7/29/2023 1544    Acceptance, E,TB,D, VU by  at 7/27/2023 1615    Acceptance, E, VU,NR by DB at 7/24/2023 1445                         Point: Body mechanics (Done)       Learning Progress Summary             Patient Acceptance, E,D, VU,NR by  at 7/29/2023 1544    Acceptance, E,TB,D, VU by  at 7/27/2023 1615    Acceptance, E, VU,NR by  at 7/24/2023 1445                         Point: Precautions (Done)       Learning Progress Summary             Patient Acceptance, E,D, VU,NR by  at 7/29/2023 1544    Acceptance, E,TB,D, VU by  at 7/27/2023 1615    Acceptance, E, VU,NR by DB at 7/24/2023 1445                                         User Key       Initials Effective Dates Name Provider Type Discipline     03/07/18 -  Beena Ray, PTA Physical Therapist Assistant PT     05/24/22 -  Melani Moctezuma, PT Physical Therapist PT    DB 06/16/21 -  Dannielle Harrison, MELODIE Physical Therapist PT                  PT Recommendation and Plan     Plan of Care Reviewed With: patient  Progress: no change  Outcome Evaluation: Pt seen for PT tx this afternoon with  noted progression of bed mobility, but had increased difficulty with coming to stand. Pt was SBA/CGA for bed mobility with increased time and the HOB elevated. Pt performed LE ther-ex while seated EOB prior to two STS attempts from an elevated bed where pt required Mod-MaxA and was unable to tolerate standing longer than 5-7s each. Pt with noted difficulty standing upright and weight shifting or weight bearing on RLE due to increased pain. Pt performed lateral scoots along EOB primarily pushing through her BUEs. Encouraged pt to perform LE ther-ex while supine in bed multiple times a day to maintain strength. PT will cont to follow pt to progress her functional mobility as tolerated.     Time Calculation:         PT Charges       Row Name 07/29/23 1544             Time Calculation    Start Time 1447  -MG      Stop Time 1523  -MG      Time Calculation (min) 36 min  -MG      PT Received On 07/29/23  -MG      PT - Next Appointment 07/31/23  -MG                User Key  (r) = Recorded By, (t) = Taken By, (c) = Cosigned By      Initials Name Provider Type    MG Melani Moctezuma, PT Physical Therapist                  Therapy Charges for Today       Code Description Service Date Service Provider Modifiers Qty    32418442587 HC PT THERAPEUTIC ACT EA 15 MIN 7/29/2023 Melani Moctezuma, PT GP 1    82614529863 HC PT THER PROC EA 15 MIN 7/29/2023 Melani Moctezuma, PT GP 1            PT G-Codes  Outcome Measure Options: AM-PAC 6 Clicks Basic Mobility (PT)  AM-PAC 6 Clicks Score (PT): 11  AM-PAC 6 Clicks Score (OT): 17  PT Discharge Summary  Anticipated Discharge Disposition (PT): skilled nursing facility    Melani Moctezuma PT  7/29/2023

## 2023-07-29 NOTE — PLAN OF CARE
Goal Outcome Evaluation:  Plan of Care Reviewed With: patient        Progress: no change  Outcome Evaluation: Pt seen for PT tx this afternoon with noted progression of bed mobility, but had increased difficulty with coming to stand. Pt was SBA/CGA for bed mobility with increased time and the HOB elevated. Pt performed LE ther-ex while seated EOB prior to two STS attempts from an elevated bed where pt required Mod-MaxA and was unable to tolerate standing longer than 5-7s each. Pt with noted difficulty standing upright and weight shifting or weight bearing on RLE due to increased pain. Pt performed lateral scoots along EOB primarily pushing through her BUEs. Encouraged pt to perform LE ther-ex while supine in bed multiple times a day to maintain strength. PT will cont to follow pt to progress her functional mobility as tolerated.      Anticipated Discharge Disposition (PT): skilled nursing facility

## 2023-07-29 NOTE — PROGRESS NOTES
Name: Halie Guidry ADMIT: 2023   : 1940  PCP: Napoleon Mead MD    MRN: [id number] LOS: 3 days   AGE/SEX: 83 y.o. female  ROOM: [facility]     Subjective   Subjective   No new complaints. Denies chest pain shortness of breath, palpitations, dizziness, numbness, tingling, abdominal pain.     Objective   Objective   Vital Signs  Temp:  [97 øF (36.1 øC)-98.6 øF (37 øC)] 98.6 øF (37 øC)  Heart Rate:  [52-56] 55  Resp:  [16-18] 16  BP: (135-148)/(63-66) 145/65  SpO2:  [94 %-97 %] 97 %  on   ;   Device (Oxygen Therapy): room air  Body mass index is 31.21 kg/mý.    Physical Exam  Constitutional:       General: She is not in acute distress.     Appearance: She is not toxic-appearing.   HENT:      Head: Normocephalic and atraumatic.   Eyes:      Extraocular Movements: Extraocular movements intact.   Cardiovascular:      Rate and Rhythm: Normal rate and regular rhythm.   Pulmonary:      Effort: Pulmonary effort is normal. No respiratory distress.      Breath sounds: Normal breath sounds. No wheezing or rhonchi.   Abdominal:      General: Bowel sounds are normal.      Palpations: Abdomen is soft.      Tenderness: There is no abdominal tenderness. There is no guarding or rebound.   Musculoskeletal:      Cervical back: Normal range of motion.      Comments: Lower legs wrapped   Skin:     General: Skin is warm and dry.   Neurological:      General: No focal deficit present.      Mental Status: She is alert and oriented to person, place, and time.   Psychiatric:         Mood and Affect: Mood normal.         Behavior: Behavior normal.         Thought Content: Thought content normal.     Results Review  I reviewed the patient's new clinical results.  Results from last 7 days   Lab Units 23  0432 23  0558 23  0530 23  0457   WBC 10*3/mm3 9.09 8.05 9.35 9.06   HEMOGLOBIN g/dL 11.1* 10.9* 11.3* 11.3*   PLATELETS 10*3/mm3 293 283 280 284       Results from last 7 days   Lab Units  07/29/23  0432 07/28/23  0558 07/27/23  0530 07/26/23  0457   SODIUM mmol/L 137 137 140 137   POTASSIUM mmol/L 4.0 3.7 4.0 4.0   CHLORIDE mmol/L 104 101 105 103   CO2 mmol/L 21.1* 23.4 22.1 21.6*   BUN mg/dL 47* 47* 41* 37*   CREATININE mg/dL 1.17* 1.29* 1.34* 1.18*   GLUCOSE mg/dL 128* 139* 190* 179*       Lab Results   Component Value Date    ANIONGAP 11.9 07/29/2023     Estimated Creatinine Clearance: 43.5 mL/min (A) (by C-G formula based on SCr of 1.17 mg/dL (H)).          Results from last 7 days   Lab Units 07/29/23  0432 07/28/23  0558 07/27/23  0530 07/26/23  0457   CALCIUM mg/dL 8.7 9.1 8.6 8.7         Glucose   Date/Time Value Ref Range Status   07/29/2023 1219 164 (H) 70 - 130 mg/dL Final     Comment:     RN Notified R and V Meter: [id number] : [id number] Danay Reyes    07/29/2023 0604 143 (H) 70 - 130 mg/dL Final     Comment:     Meter: [id number] : [id number] Perry Hernandez NA   07/28/2023 2126 143 (H) 70 - 130 mg/dL Final     Comment:     Meter: [id number] : [id number] Perry Garciaa NA   07/28/2023 1630 90 70 - 130 mg/dL Final     Comment:     Meter: [id number] : [id number] Zachary Pompa [facility]   07/28/2023 1135 152 (H) 70 - 130 mg/dL Final     Comment:     Meter: [id number] : [id number] Jurgen Melgar [facility]   07/28/2023 0628 143 (H) 70 - 130 mg/dL Final     Comment:     Meter: [id number] : [id number] Helga Mcdonald RN   07/27/2023 2012 251 (H) 70 - 130 mg/dL Final     Comment:     Meter: [id number] : [id number] Amador Mary NA       No radiology results for the last day    Scheduled Meds  amLODIPine, 5 mg, Oral, Daily  atorvastatin, 80 mg, Oral, Nightly  bisoprolol, 5 mg, Oral, Daily  chlorthalidone, 25 mg, Oral, Daily  DULoxetine, 30 mg, Oral, Daily  ferrous sulfate, 325 mg, Oral, Daily With Breakfast  hydrocortisone-bacitracin-zinc oxide-nystatin, 1 application , Topical, BID  insulin glargine, 65 Units, Subcutaneous, Nightly  insulin lispro, 18 Units, Subcutaneous,  TID With Meals  insulin lispro, 3-14 Units, Subcutaneous, 4x Daily With Meals & Nightly  levothyroxine, 112 mcg, Oral, Once per day on Mon Tue Wed Thu Fri Sat  levothyroxine, 168 mcg, Oral, Once per day on Sun  lisinopril, 40 mg, Oral, Daily  pantoprazole, 40 mg, Oral, Q AM  pregabalin, 50 mg, Oral, TID  senna-docusate sodium, 2 tablet, Oral, BID  sodium chloride, 10 mL, Intravenous, Q12H  terazosin, 1 mg, Oral, Nightly  cyanocobalamin, 1,000 mcg, Oral, Daily    Continuous Infusions   PRN Meds    senna-docusate sodium **AND** polyethylene glycol **AND** bisacodyl **AND** bisacodyl    dextrose    dextrose    glucagon (human recombinant)    melatonin    sodium chloride    sodium chloride     Diet  Diet: Cardiac Diets, Diabetic Diets; Healthy Heart (2-3 Na+); Consistent Carbohydrate; Texture: Regular Texture (IDDSI 7); Fluid Consistency: Thin (IDDSI 0)    I have personally reviewed:  [x]  Medications  [x]  Laboratory   []  Microbiology   []  Radiology  []  EKG/Telemetry   []  Cardiology/Vascular   []  Pathology   []  Records      Assessment/Plan     Active Hospital Problems    Diagnosis  POA    **Bilateral leg pain [M79.604, M79.605]  Yes    Hypertensive kidney disease with stage 3a chronic kidney disease [I12.9, N18.31]  Yes    Anxiety disorder [F41.9]  Yes    Noncompliance with medication regimen [Z91.148]  Not Applicable    Hyperlipidemia [E78.5]  Yes    Primary hypothyroidism [E03.9]  Yes    Type 2 diabetes mellitus with hyperglycemia, with long-term current use of insulin [E11.65, Z79.4]  Not Applicable    Benign essential hypertension [I10]  Yes      Resolved Hospital Problems   No resolved problems to display.     83 y.o. female with history of diabetes, neuropathy, diabetic foot ulcer, hypertension, hyperlipidemia, hypothyroidism, cognitive impairment, recurrent UTIs presented with bilateral lower extremity swelling and pain since May and decreased activity due to pain. She was in the hospital in May for lower  extremity cellulitis declined SNF recommendation.    Leg pain  Venous insufficiency  -Outpatient Doppler negative for DVT  -Right lower leg films severe degenerative changes at the knee    Abnormal chest x-ray  -Radiology recommended CT with contrast   -CT scan no suspicious right hilar pulmonary mass. Mild right basilar atelectasis.     Bilateral renal lesions seen on above CT imaging  -CT of the abdomen pelvis was done which was suggestive of hypoenhancing mass at the pancreatic head and there is a exophytic component or node adjacently suggestive of neuroendocrine tumor.  Recommendation is endoscopic ultrasound with FNA.  GI consult has been obtained and serology work-up has been initiated for neuroendocrine tumor and may consider colonoscopy versus sigmoidoscopy as an outpatient basis to rule out microscopic colitis.  GI recommends outpatient endoscopy ultrasound with FNA and they are making arrangements regarding the same.  -Monitor renal function with CKD (creatinine stable)    DM2 with neuropathy  -Has been noncompliant as an outpatient  -A1c 11.80% last month  -Continue with Lantus, bolus and corrective dose insulin and blood sugars are reasonably well controlled    Hypertension  -Continue lisinopril and blood pressure is in acceptable range.  Avoid amlodipine due to lower extremity edema and beta-blockers due to borderline low heart rate.    Hyperlipidemia  -Statin    Hypothyroidism  -Levothyroxine    CKD 3A  -Continue to monitor (received contrast yesterday and will get today)    SCDs for DVT prophylaxis    Discussed with patient and nursing staff    Discharge:   To rehab once a bed is available    Copied text on this note has been reviewed by me on 7/29/2023    Kelechi Sanchez MD  [facility]  07/29/23

## 2023-07-29 NOTE — PLAN OF CARE
Goal Outcome Evaluation:  Plan of Care Reviewed With: patient        Progress: no change  Outcome Evaluation: Pt runs bradycardic, other VSS, norco given for c/o BLE pain w/ relief, blood sugar monitoring, HOB maintained at 30 degrees or less, heels elevated off mattress, SCD's on, purewick in place, no BM this shift, still need stool sample for cdiff r/o, falls precautions maintained, daily weight, precert pending for  d/c

## 2023-07-30 VITALS
TEMPERATURE: 97.8 F | HEART RATE: 66 BPM | WEIGHT: 205.25 LBS | SYSTOLIC BLOOD PRESSURE: 168 MMHG | OXYGEN SATURATION: 95 % | BODY MASS INDEX: 31.11 KG/M2 | DIASTOLIC BLOOD PRESSURE: 65 MMHG | HEIGHT: 68 IN | RESPIRATION RATE: 16 BRPM

## 2023-07-30 PROBLEM — R60.0 PERIPHERAL EDEMA: Status: ACTIVE | Noted: 2023-07-30

## 2023-07-30 PROBLEM — R60.9 PERIPHERAL EDEMA: Status: ACTIVE | Noted: 2023-07-30

## 2023-07-30 LAB
ANION GAP SERPL CALCULATED.3IONS-SCNC: 12.7 MMOL/L (ref 5–15)
BASOPHILS # BLD AUTO: 0.1 10*3/MM3 (ref 0–0.2)
BASOPHILS NFR BLD AUTO: 1 % (ref 0–1.5)
BUN SERPL-MCNC: 57 MG/DL (ref 8–23)
BUN/CREAT SERPL: 38.5 (ref 7–25)
CALCIUM SPEC-SCNC: 9.2 MG/DL (ref 8.6–10.5)
CHLORIDE SERPL-SCNC: 100 MMOL/L (ref 98–107)
CO2 SERPL-SCNC: 21.3 MMOL/L (ref 22–29)
CREAT SERPL-MCNC: 1.48 MG/DL (ref 0.57–1)
DEPRECATED RDW RBC AUTO: 44 FL (ref 37–54)
EGFRCR SERPLBLD CKD-EPI 2021: 35 ML/MIN/1.73
EOSINOPHIL # BLD AUTO: 0.37 10*3/MM3 (ref 0–0.4)
EOSINOPHIL NFR BLD AUTO: 3.8 % (ref 0.3–6.2)
ERYTHROCYTE [DISTWIDTH] IN BLOOD BY AUTOMATED COUNT: 13.9 % (ref 12.3–15.4)
GLUCOSE BLDC GLUCOMTR-MCNC: 151 MG/DL (ref 70–130)
GLUCOSE BLDC GLUCOMTR-MCNC: 166 MG/DL (ref 70–130)
GLUCOSE BLDC GLUCOMTR-MCNC: 293 MG/DL (ref 70–130)
GLUCOSE BLDC GLUCOMTR-MCNC: 310 MG/DL (ref 70–130)
GLUCOSE SERPL-MCNC: 294 MG/DL (ref 65–99)
HCT VFR BLD AUTO: 34.8 % (ref 34–46.6)
HGB BLD-MCNC: 11.2 G/DL (ref 12–15.9)
IMM GRANULOCYTES # BLD AUTO: 0.05 10*3/MM3 (ref 0–0.05)
IMM GRANULOCYTES NFR BLD AUTO: 0.5 % (ref 0–0.5)
LYMPHOCYTES # BLD AUTO: 1.49 10*3/MM3 (ref 0.7–3.1)
LYMPHOCYTES NFR BLD AUTO: 15.2 % (ref 19.6–45.3)
MCH RBC QN AUTO: 27.7 PG (ref 26.6–33)
MCHC RBC AUTO-ENTMCNC: 32.2 G/DL (ref 31.5–35.7)
MCV RBC AUTO: 86.1 FL (ref 79–97)
MONOCYTES # BLD AUTO: 0.82 10*3/MM3 (ref 0.1–0.9)
MONOCYTES NFR BLD AUTO: 8.4 % (ref 5–12)
NEUTROPHILS NFR BLD AUTO: 6.95 10*3/MM3 (ref 1.7–7)
NEUTROPHILS NFR BLD AUTO: 71.1 % (ref 42.7–76)
NRBC BLD AUTO-RTO: 0 /100 WBC (ref 0–0.2)
PLATELET # BLD AUTO: 309 10*3/MM3 (ref 140–450)
PMV BLD AUTO: 9.9 FL (ref 6–12)
POTASSIUM SERPL-SCNC: 4.1 MMOL/L (ref 3.5–5.2)
RBC # BLD AUTO: 4.04 10*6/MM3 (ref 3.77–5.28)
SODIUM SERPL-SCNC: 134 MMOL/L (ref 136–145)
WBC NRBC COR # BLD: 9.78 10*3/MM3 (ref 3.4–10.8)

## 2023-07-30 PROCEDURE — 63710000001 INSULIN GLARGINE PER 5 UNITS: Performed by: INTERNAL MEDICINE

## 2023-07-30 PROCEDURE — 80048 BASIC METABOLIC PNL TOTAL CA: CPT | Performed by: HOSPITALIST

## 2023-07-30 PROCEDURE — 99232 SBSQ HOSP IP/OBS MODERATE 35: CPT | Performed by: INTERNAL MEDICINE

## 2023-07-30 PROCEDURE — 82948 REAGENT STRIP/BLOOD GLUCOSE: CPT

## 2023-07-30 PROCEDURE — 63710000001 INSULIN LISPRO (HUMAN) PER 5 UNITS: Performed by: NURSE PRACTITIONER

## 2023-07-30 PROCEDURE — G0378 HOSPITAL OBSERVATION PER HR: HCPCS

## 2023-07-30 PROCEDURE — 85025 COMPLETE CBC W/AUTO DIFF WBC: CPT | Performed by: INTERNAL MEDICINE

## 2023-07-30 PROCEDURE — 63710000001 INSULIN LISPRO (HUMAN) PER 5 UNITS: Performed by: HOSPITALIST

## 2023-07-30 RX ORDER — HYDROCODONE BITARTRATE AND ACETAMINOPHEN 5; 325 MG/1; MG/1
1 TABLET ORAL EVERY 6 HOURS PRN
Qty: 12 TABLET | Refills: 0 | Status: SHIPPED | OUTPATIENT
Start: 2023-07-30

## 2023-07-30 RX ORDER — PREGABALIN 50 MG/1
50 CAPSULE ORAL 3 TIMES DAILY
Qty: 9 CAPSULE | Refills: 0 | Status: SHIPPED | OUTPATIENT
Start: 2023-07-30 | End: 2023-08-02

## 2023-07-30 RX ADMIN — HYDROCODONE BITARTRATE AND ACETAMINOPHEN 1 TABLET: 5; 325 TABLET ORAL at 13:52

## 2023-07-30 RX ADMIN — LISINOPRIL 40 MG: 40 TABLET ORAL at 10:32

## 2023-07-30 RX ADMIN — DULOXETINE HYDROCHLORIDE 30 MG: 30 CAPSULE, DELAYED RELEASE ORAL at 10:30

## 2023-07-30 RX ADMIN — HYDROCODONE BITARTRATE AND ACETAMINOPHEN 1 TABLET: 5; 325 TABLET ORAL at 05:57

## 2023-07-30 RX ADMIN — INSULIN LISPRO 3 UNITS: 100 INJECTION, SOLUTION INTRAVENOUS; SUBCUTANEOUS at 21:13

## 2023-07-30 RX ADMIN — INSULIN LISPRO 18 UNITS: 100 INJECTION, SOLUTION INTRAVENOUS; SUBCUTANEOUS at 13:52

## 2023-07-30 RX ADMIN — PREGABALIN 50 MG: 50 CAPSULE ORAL at 20:28

## 2023-07-30 RX ADMIN — FERROUS SULFATE TAB 325 MG (65 MG ELEMENTAL FE) 325 MG: 325 (65 FE) TAB at 10:29

## 2023-07-30 RX ADMIN — INSULIN LISPRO 18 UNITS: 100 INJECTION, SOLUTION INTRAVENOUS; SUBCUTANEOUS at 10:28

## 2023-07-30 RX ADMIN — Medication 5 MG: at 20:33

## 2023-07-30 RX ADMIN — ZINC OXIDE 1 APPLICATION: 200 OINTMENT TOPICAL at 10:29

## 2023-07-30 RX ADMIN — HYDROCODONE BITARTRATE AND ACETAMINOPHEN 1 TABLET: 5; 325 TABLET ORAL at 20:28

## 2023-07-30 RX ADMIN — INSULIN LISPRO 8 UNITS: 100 INJECTION, SOLUTION INTRAVENOUS; SUBCUTANEOUS at 13:52

## 2023-07-30 RX ADMIN — BISOPROLOL FUMARATE 5 MG: 5 TABLET, FILM COATED ORAL at 10:33

## 2023-07-30 RX ADMIN — ATORVASTATIN CALCIUM 80 MG: 20 TABLET, FILM COATED ORAL at 20:28

## 2023-07-30 RX ADMIN — Medication 10 ML: at 10:32

## 2023-07-30 RX ADMIN — TERAZOSIN 1 MG: 1 CAPSULE ORAL at 20:33

## 2023-07-30 RX ADMIN — PREGABALIN 50 MG: 50 CAPSULE ORAL at 16:31

## 2023-07-30 RX ADMIN — CHLORTHALIDONE 25 MG: 25 TABLET ORAL at 10:33

## 2023-07-30 RX ADMIN — PREGABALIN 50 MG: 50 CAPSULE ORAL at 10:31

## 2023-07-30 RX ADMIN — INSULIN GLARGINE 65 UNITS: 100 INJECTION, SOLUTION SUBCUTANEOUS at 21:13

## 2023-07-30 RX ADMIN — INSULIN LISPRO 18 UNITS: 100 INJECTION, SOLUTION INTRAVENOUS; SUBCUTANEOUS at 18:56

## 2023-07-30 RX ADMIN — AMLODIPINE BESYLATE 5 MG: 5 TABLET ORAL at 10:33

## 2023-07-30 RX ADMIN — Medication 1000 MCG: at 10:29

## 2023-07-30 RX ADMIN — INSULIN LISPRO 10 UNITS: 100 INJECTION, SOLUTION INTRAVENOUS; SUBCUTANEOUS at 10:28

## 2023-07-30 NOTE — CASE MANAGEMENT/SOCIAL WORK
Continued Stay Note  [facility]     Patient Name: Halie Guidry  MRN: [id number]  Today's Date: 7/30/2023    Admit Date: 7/21/2023    Plan: DC to skilled care at [place] via [facility]   Discharge Plan       Row Name 07/30/23 0845       Plan    Plan DC to skilled care at [place] via [facility]    Patient/Family in Agreement with Plan yes    Plan Comments Contacted by staff RN who states pt will likely DC today after MD makes rounds. Pt will need ambulance transport since she is unable to ambulate on her own. Called and spoke with [facility] and scheduled pt for 5PM. Called and spoke with pt and she is agreeable to DC plan should MD write orders for DC today. Updated staff RN. CCP to follow.....JW                   Discharge Codes    No documentation.                 Expected Discharge Date and Time       Expected Discharge Date Expected Discharge Time    Jul 29, 2023               Meena Haddad, RN

## 2023-07-30 NOTE — DISCHARGE SUMMARY
Patient Name: Halie Guidry  : 1940  MRN: [id number]    Date of Admission: 2023  Date of Discharge:  2023  Primary Care Physician: Napoleon Mead MD      Chief Complaint:   Leg Pain      Discharge Diagnoses     Active Hospital Problems    Diagnosis  POA    **Bilateral leg pain [M79.604, M79.605]  Yes    Hypertensive kidney disease with stage 3a chronic kidney disease [I12.9, N18.31]  Yes    Anxiety disorder [F41.9]  Yes    Noncompliance with medication regimen [Z91.148]  Not Applicable    Hyperlipidemia [E78.5]  Yes    Primary hypothyroidism [E03.9]  Yes    Type 2 diabetes mellitus with hyperglycemia, with long-term current use of insulin [E11.65, Z79.4]  Not Applicable    Benign essential hypertension [I10]  Yes      Resolved Hospital Problems   No resolved problems to display.        Hospital Course   Ms Guidry is a 83 year old female with history of uncontrolled diabetes mellitus, neuropathy, diabetic foot ulcer, HTN, HLD, hypothyroidism, recurrent UTIs, cognitive impairment, and depression who presents to [facility] complaining of bilateral lower extremity swelling and pain that has been ongoing since May, when she was first treated for a left foot diabetic ulcer. She states she has [facility] who completed a doppler of her lower extremities was negative for any DVTs.  She states that the pain has caused her to be unable to get up out of her recliner, or take care of herself.  She does live at home with her son, who insist she come to the emergency department for evaluation because her mobility had declined significantly. She confirms his concerns she states that she is unable to ambulate, or get out of her recliner to use the toilet, she is subsequently using depends, for stooling, and voiding.  She does endorse being extremely frustrated, with the pain in her feet. She does admits she did not take her insulin today, or check her blood glucose  level.      Upon exam she is alert and oriented sitting up in bed, in no acute distress, her lungs are CTA, her heart rate and rhythm are normal regular, with no clicks, rubs, or gallops noted, her abdomen is soft round nondistended, nontender, with audible bowel sounds throughout, she does have bilateral lower extremity edema, with erythema, with healing diabetic ulcer left foot.  She denies any acute distress chest pain, shortness of breath, palpitations, fever, chills, cough, known sick contacts, abdominal pain, nausea, vomiting, constipation, or  complaints.     Initial evaluation in the emergency department blood pressure 143/66, heart rate 57, respiratory rate 18, oxygen saturation 94% on room air, she is afebrile with a Tmax of 97.8  Creatinine is 1.13, BUN 29, down from June 2, 2023 where she was 1.27, 28, she does have noted hyperglycemia her glucose is 336, she has normal white count, her chest x-ray today shows mild right lung atelectasis versus pneumonia with soft tissue fullness of the right hilum.   EKG interpreted as sinus rhythm, heart rate 56, RBBB no obvious ischemia noted.      Leg pain  Venous insufficiency  -Outpatient Doppler negative for DVT  -Right lower leg films severe degenerative changes at the knee  -Pain is now reasonably well controlled     Abnormal chest x-ray  -Radiology recommended CT with contrast   -CT scan no suspicious right hilar pulmonary mass. Mild right basilar atelectasis.      Bilateral renal lesions/pancreatic head mass seen on above CT imaging  -CT of the abdomen pelvis was done which was suggestive of hypoenhancing mass at the pancreatic head and there is a exophytic component or node adjacently suggestive of neuroendocrine tumor.  Recommendation is endoscopic ultrasound with FNA.  GI consult has been obtained and serology work-up has been initiated for neuroendocrine tumor and may consider colonoscopy versus sigmoidoscopy as an outpatient basis to rule out  microscopic colitis.  GI recommends outpatient endoscopy ultrasound with FNA and they are making arrangements regarding the same at [facility](Dr. Alireza Borrego-->GI)     DM2 with neuropathy  -Has been noncompliant as an outpatient  -A1c 11.80% last month  -Continue with home regimen upon discharge.     Hypertension  -Continue with amlodipine, bisoprolol, doxazosin and lisinopril.  Blood pressure is in acceptable range.     Hyperlipidemia  -Statin     Hypothyroidism  -Levothyroxine     CKD 3A  -Continue to monitor   -Creatinine is at 1.48 which is close to baseline    Copied text on this note has been reviewed by me on 7/30/2023    Day of Discharge         Physical Exam:  Temp:  [97 øF (36.1 øC)-97.4 øF (36.3 øC)] 97 øF (36.1 øC)  Heart Rate:  [60-65] 65  Resp:  [16-18] 16  BP: (133-148)/(55-65) 133/65  Body mass index is 31.21 kg/mý.  Physical Exam  General: She is not in acute distress.     Appearance: She is not toxic-appearing.   HENT:      Head: Normocephalic and atraumatic.   Eyes:      Extraocular Movements: Extraocular movements intact.   Cardiovascular:      Rate and Rhythm: Normal rate and regular rhythm.   Pulmonary:      Effort: Pulmonary effort is normal. No respiratory distress.      Breath sounds: Normal breath sounds. No wheezing or rhonchi.   Abdominal:      General: Bowel sounds are normal.      Palpations: Abdomen is soft.      Tenderness: There is no abdominal tenderness. There is no guarding or rebound.   Musculoskeletal:      Cervical back: Normal range of motion.      Comments: Lower legs wrapped   Skin:     General: Skin is warm and dry.   Neurological:      General: No focal deficit present.      Mental Status: She is alert and oriented to person, place, and time.   Psychiatric:         Mood and Affect: Mood normal.         Behavior: Behavior normal.         Thought Content: Thought content normal.         Consultants     Consult Orders (all) (From admission, onward)       Start      Ordered    07/27/23 1328  Hematology & Oncology Inpatient Consult  Once        Specialty:  Hematology and Oncology  Provider:  Hanna Naqvi MD PhD    07/27/23 1327    07/26/23 1744  Inpatient Gastroenterology Consult  Once        Specialty:  Gastroenterology  Provider:  Ileana Connors MD    07/26/23 1744 07/26/23 1744  Hematology & Oncology Inpatient Consult  Once        Specialty:  Hematology and Oncology  Provider:  Napoleon Corrigan II, MD    07/26/23 1744 07/21/23 2008  Inpatient Case Management  Consult  Once        Provider:  (Not yet assigned)    07/21/23 2008 07/21/23 1932  [facility] (on-call MD unless specified) Details  Once        Specialty:  Hospitalist  Provider:  (Not yet assigned)    07/21/23 1931                  Procedures     * Surgery not found *    Imaging Results (All)       Procedure Component Value Units Date/Time    CT Abdomen Pelvis With & Without Contrast [[id number]] Collected: 07/26/23 1614     Updated: 07/27/23 0834    Narrative:      CT ABDOMEN AND PELVIS WITHOUT AND WITH IV CONTRAST     HISTORY: 83-year-old female with an indeterminate right renal lesion.     TECHNIQUE: Radiation dose reduction techniques were utilized, including  automated exposure control and exposure modulation based on body size. 2  mm images were obtained through the abdomen and pelvis without contrast.  Subsequently, IV contrast was administered and 3 mm images were obtained  through the abdomen and pelvis. Delayed thin cut series through the  abdomen and pelvis was obtained as well. Compared with chest CT  07/24/2023 and compared with CT of the abdomen 03/26/2022.     FINDINGS: There is a slightly hyperdense slightly lobular partially  exophytic, nodule at the upper pole of the right kidney with internal  density measurements of 29 Hounsfield units on the precontrast series  and there is no post contrast enhancement. This measured 2.2 cm on the  previous CT abdomen. There is a subcentimeter  hyperdense nodule medially  which is stable in size. There are also a few subcentimeter hyperdense  nodules within the parenchyma of the kidney which can't be compared with  the previous contrast-enhanced CT abdomen. There are several variable  sized left renal cysts. There are no enhancing renal lesions  bilaterally. Urinary bladder wall appears thickened, but the bladder is  nearly completely collapsed.     There is mild hyperenhancement of the pancreatic head and the suspected  mass measures approximately 3.6 cm in diameter. The mass significantly  narrows the common bile duct. There is either a 2.8 x 2.2 cm node or  exophytic component inferiorly posteriorly, series 3 images 57-66. There  is mild dilatation of the pancreatic duct which measures up to 3-4 mm in  diameter. There is no intrahepatic biliary dilatation and there is  pneumobilia. Interval cholecystectomy since the previous CT. There is no  acute bowel abnormality. Uterus and adnexa appear unremarkable. There is  no free fluid or lymphadenopathy. There are extensive abdominal aortic  atherosclerotic changes and there is high-grade stenosis at the  bifurcation and the origins of both common iliac arteries.       Impression:      1. There is a hyperenhancing mass at the pancreatic head measuring  approximately 3.6 cm and there is a 2.8 cm exophytic component or node  adjacently. The appearance is suspicious for a neuroendocrine tumor. The  mass significantly narrows the downstream portion of the common bile  duct, but there is no intrahepatic biliary dilatation and there is  pneumobilia. There is mild dilatation of the main pancreatic duct. For  tissue diagnosis, endoscopic ultrasound with FNA is recommended.  2. There are a few slightly hyperdense right renal nodules which likely  represent proteinaceous cysts. There are no enhancing or suspicious  renal lesions.     I called the patient's nurse this morning and asked her to notify the  patient's  physician to read this report today morning.     This report was finalized on 7/27/2023 8:31 AM by Dr. Diane Munoz M.D.       CT Chest With Contrast Diagnostic [[id number]] Collected: 07/24/23 0850     Updated: 07/25/23 1252    Narrative:      CT CHEST WITH CONTRAST     HISTORY: 83-year-old female with prominent right hilum as seen on  radiograph.     TECHNIQUE: Axial CT images of the chest were obtained following  administration of intravenous contrast. Coronal and sagittal reformats  were obtained.     COMPARISON: Chest radiograph from 07/21/2023.     FINDINGS: There are numerous mediastinal and right hilar lymph nodes,  majority of which are predominantly calcified. These are favored to  represent residual from prior granulomatous disease. No suspicious soft  tissue right hilar abnormality is identified. There is no pleural or  pericardial effusion. Calcified plaque within the thoracic aorta.  Calcified coronary artery disease is present. There is a small hiatal  hernia present. The visualized upper abdomen demonstrates pneumobilia  possibly related to prior sphincterotomy. Calcified splenic granulomas  are present. There are bilateral renal cortical lesions, many of which  demonstrate water attenuation. There is a intermediate density lesion  arising from the upper pole of the right kidney measuring up to 2 cm  demonstrating internal attenuation values of about 28 Hounsfield units.  On review of prior CT from 2022, this area demonstrated a particularly  hyperdense cyst that measured up to 70 Hounsfield units. Small  hyperdense lesion arising from the midpole is favored to represent a  benign hemorrhagic or proteinaceous cyst.     The central airways are patent. There is posterior right upper lobe  pleural parenchymal scarring present. Right basilar atelectatic change  is present. There are no suspicious pulmonary nodules or focal airspace  disease identified.       Impression:      1. No suspicious right hilar or  pulmonary mass. Mild right basilar  atelectasis.  2. Numerous renal cortical lesions partly imaged. There is at least 1  intermediate density lesion. At this time, follow-up with pre and  postcontrast CT or MR renal mass protocol is recommended for  characterization on a nonemergent basis.  3. Small sliding hiatal hernia.     Radiation dose reduction techniques were utilized, including automated  exposure control and exposure modulation based on body size.     This report was finalized on 7/25/2023 12:49 PM by Dr. Gennaro Henley M.D.       XR Tibia Fibula 2 View Right [[id number]] Collected: 07/23/23 1224     Updated: 07/23/23 1335    Narrative:      TWO-VIEW RIGHT KNEE, TWO-VIEW RIGHT TIB-FIB, THREE-VIEW RIGHT ANKLE     HISTORY: Lower leg pain and swelling.     There is prominent diffuse osteoporosis. There are very severe  degenerative changes at the knee with joint space narrowing and  articular irregularity and marginal spurring. There is no evidence of  joint effusion. The right tibia and fibula are unremarkable. No  abnormality is seen at the ankle.     This report was finalized on 7/23/2023 1:32 PM by Dr. Elder Biggs M.D.       XR Ankle 3+ View Right [[id number]] Collected: 07/23/23 1224     Updated: 07/23/23 1335    Narrative:      TWO-VIEW RIGHT KNEE, TWO-VIEW RIGHT TIB-FIB, THREE-VIEW RIGHT ANKLE     HISTORY: Lower leg pain and swelling.     There is prominent diffuse osteoporosis. There are very severe  degenerative changes at the knee with joint space narrowing and  articular irregularity and marginal spurring. There is no evidence of  joint effusion. The right tibia and fibula are unremarkable. No  abnormality is seen at the ankle.     This report was finalized on 7/23/2023 1:32 PM by Dr. Elder Biggs M.D.       XR Knee 1 or 2 View Right [[id number]] Collected: 07/23/23 1224     Updated: 07/23/23 1335    Narrative:      TWO-VIEW RIGHT KNEE, TWO-VIEW RIGHT TIB-FIB, THREE-VIEW RIGHT ANKLE      HISTORY: Lower leg pain and swelling.     There is prominent diffuse osteoporosis. There are very severe  degenerative changes at the knee with joint space narrowing and  articular irregularity and marginal spurring. There is no evidence of  joint effusion. The right tibia and fibula are unremarkable. No  abnormality is seen at the ankle.     This report was finalized on 7/23/2023 1:32 PM by Dr. Elder Biggs M.D.       XR Chest 1 View [[id number]] Collected: 07/1940     Updated: 07/21/23 2005    Narrative:      SINGLE VIEW CHEST RADIOGRAPH     HISTORY: 83-year-old female with history of shortness of breath.     FINDINGS: An upright AP portable chest radiograph was obtained.  Comparison is made to a chest radiograph dated 05/13/2023. The lungs are  normal in volume. There is hazy soft tissue opacification at the base of  the right lung medially. The left lung is clear. There is suspected soft  tissue fullness in the right hilum. Mild atheromatous calcification in  the aortic arch is noted. Osteopenia is appreciated. Bilateral  glenohumeral joint osteophytic change is noted.       Impression:      There is mild right lung base atelectasis versus pneumonia.  Soft tissue fullness in the region of the right hilum is also suspected.  A follow-up CT of the chest with contrast is suggested.     This report was finalized on 7/21/2023 8:02 PM by Dr. Steven Garcia M.D.             Results for orders placed during the hospital encounter of 11/03/22    Duplex Venous Lower Extremity - Left CAR    Interpretation Summary    Normal left lower extremity venous duplex scan.    Results for orders placed during the hospital encounter of 06/22/22    Adult transthoracic echo complete    Interpretation Summary  ú Left ventricular wall thickness is consistent with mild concentric hypertrophy.  ú Calculated left ventricular EF = 68% Estimated left ventricular EF was in agreement with the calculated left ventricular EF. Left  ventricular systolic function is normal.  ú Normal right ventricular cavity size and systolic function noted.  ú The left atrial cavity is mildly dilated  ú Saline test results are negative.  ú Mild mitral valve regurgitation is present.  ú There is no evidence of pericardial effusion    Pertinent Labs     Results from last 7 days   Lab Units 07/30/23  0551 07/29/23  0432 07/28/23  0558 07/27/23  0530   WBC 10*3/mm3 9.78 9.09 8.05 9.35   HEMOGLOBIN g/dL 11.2* 11.1* 10.9* 11.3*   PLATELETS 10*3/mm3 309 293 283 280     Results from last 7 days   Lab Units 07/30/23  0551 07/29/23  0432 07/28/23  0558 07/27/23  0530   SODIUM mmol/L 134* 137 137 140   POTASSIUM mmol/L 4.1 4.0 3.7 4.0   CHLORIDE mmol/L 100 104 101 105   CO2 mmol/L 21.3* 21.1* 23.4 22.1   BUN mg/dL 57* 47* 47* 41*   CREATININE mg/dL 1.48* 1.17* 1.29* 1.34*   GLUCOSE mg/dL 294* 128* 139* 190*   EGFR mL/min/1.73 35.0* 46.4* 41.3* 39.4*       Results from last 7 days   Lab Units 07/30/23  0551 07/29/23  0432 07/28/23  0558 07/27/23  0530   CALCIUM mg/dL 9.2 8.7 9.1 8.6               Invalid input(s): LDLCALC          Test Results Pending at Discharge     Pending Labs       Order Current Status    Chromogranin A In process    Gastrin In process    Glucagon In process    Pancreatic Polypeptide In process    Proinsulin In process    Somatostatin In process    Vasoactive Intestinal Peptide (VIP) In process            Discharge Details        Discharge Medications        Continue These Medications        Instructions Start Date   amLODIPine 5 MG tablet  Commonly known as: NORVASC   5 mg, Oral, Daily      atorvastatin 80 MG tablet  Commonly known as: LIPITOR   TAKE 1 TABLET EVERY NIGHT      BD Pen Needle Naila 2nd Gen 32G X 4 MM misc  Generic drug: Insulin Pen Needle   USE TO INJECT INSULIN FOUR TIMES DAILY      bisoprolol 5 MG tablet  Commonly known as: ZEBeta   5 mg, Oral, Daily      chlorthalidone 25 MG tablet  Commonly known as: HYGROTON   25 mg, Oral, Daily       cyanocobalamin 1000 MCG tablet  Commonly known as: VITAMIN B-12   1,000 mcg, Oral, Daily      doxazosin 1 MG tablet  Commonly known as: CARDURA   No dose, route, or frequency recorded.      DULoxetine 30 MG capsule  Commonly known as: CYMBALTA   30 mg, Oral, Daily      HYDROcodone-acetaminophen 5-325 MG per tablet  Commonly known as: NORCO   1 tablet, Oral, Every 6 Hours PRN      hydrocortisone-zinc oxide-bacitracin-nystatin cream   1 application , Topical, 2 Times Daily PRN      Insulin Glargine (1 Unit Dial) 300 UNIT/ML solution pen-injector injection  Commonly known as: TOUJEO   65 Units, Subcutaneous, Daily      insulin lispro 100 UNIT/ML injection  Commonly known as: HUMALOG/ADMELOG   8 Units, Subcutaneous, 3 Times Daily With Meals      lansoprazole 15 MG capsule  Commonly known as: PREVACID   15 mg, Oral, Daily      levothyroxine 112 MCG tablet  Commonly known as: SYNTHROID, LEVOTHROID   levothyroxine 112 mcg 1 tablet daily, except on Sunday take 1.5 tablets.      lisinopril 40 MG tablet  Commonly known as: PRINIVIL,ZESTRIL   40 mg, Oral, Daily      pregabalin 50 MG capsule  Commonly known as: LYRICA   50 mg, Oral, 3 Times Daily      Trulicity 1.5 MG/0.5ML solution pen-injector  Generic drug: Dulaglutide   1.5 mg, Subcutaneous, Weekly, sunday             Stop These Medications      amoxicillin-clavulanate 875-125 MG per tablet  Commonly known as: AUGMENTIN              Allergies   Allergen Reactions    Sulfa Antibiotics Unknown - High Severity     Pt says it was a long time ago and I don't remember but it wasn't good.        Discharge Disposition:  Skilled Nursing Facility (DC - External)      Discharge Diet:  Diet Order   Procedures    Diet: Cardiac Diets, Diabetic Diets; Healthy Heart (2-3 Na+); Consistent Carbohydrate; Texture: Regular Texture (IDDSI 7); Fluid Consistency: Thin (IDDSI 0)       Discharge Activity:   Activity Instructions       Activity as Tolerated              CODE STATUS:    Code  Status and Medical Interventions:   Ordered at: 07/21/23 2008     Code Status (Patient has no pulse and is not breathing):    CPR (Attempt to Resuscitate)     Medical Interventions (Patient has pulse or is breathing):    Full Support     Release to patient:    Routine Release       Future Appointments   Date Time Provider Department Center   8/3/2023  3:30 PM Lilia Orona [facility]     Additional Instructions for the Follow-ups that You Need to Schedule       Discharge Follow-up with Specified Provider: Dr.Jonathan Borrego; 2 Weeks   As directed      To: Dr.Jonathan Borrego   Follow Up: 2 Weeks        Discharge Follow-up with Specified Provider:  -->Oncology; 2 Weeks   As directed      To:  -->Oncology   Follow Up: 2 Weeks               Contact information for follow-up providers       Napoleon Mead MD .    Specialty: Family Medicine  Contact information:  [address]  [place] [facility] [address]  [phone]               Alireza Borrego MD Follow up.    Specialties: Gastroenterology, Internal Medicine  Why: to schedule EUS (endoscopic biopsy of pancreas)  Contact information:  [address] [facility] [address]  [phone]               Napoleon Gutierrez MD Follow up.    Specialties: Hematology and Oncology, Oncology, Hematology  Why: If the patient is discharged to SNU I can follow-up results of her biopsy and lab data outpatient.  Contact information:  [address]  [place] [address]  [phone]                       Contact information for after-discharge care       Destination       [facility] .    Service: Skilled Nursing  Contact information:  [address] [address] [phone]                                   Additional Instructions for the Follow-ups that You Need to Schedule       Discharge Follow-up with Specified Provider: Dr.Jonathan Borrego; 2 Weeks   As directed      To: Dr.Jonathan Borrego    Follow Up: 2 Weeks        Discharge Follow-up with Specified Provider:  -->Oncology; 2 Weeks   As directed      To:  -->Oncology   Follow Up: 2 Weeks            Time Spent on Discharge:  Greater than 30 minutes      Kelechi Sanchez MD  [facility]  07/30/23  11:05 EDT

## 2023-07-30 NOTE — PLAN OF CARE
Goal Outcome Evaluation:  Plan of Care Reviewed With: patient        Progress: no change  Outcome Evaluation: VSS, norco given for c/o BLE pain w/ relief, HOB maintained at 30 degrees or less, heels elevated off mattress, weight shift assistance provided, on room air, SCD's on, purewick in place, magic barrier applied as ordered, blood sugar monitoring, up to BSC w/ assist X 2, BM this shift, daily weight, saline locked, plan is for D/C to rehab facility

## 2023-07-30 NOTE — CASE MANAGEMENT/SOCIAL WORK
"Physicians Statement of Medical Necessity for  Ambulance Transportation    GENERAL INFORMATION     Name: Halie Guidry  YOB: 1940  Medicare #: see face sheet       Transport Date: 7/30/23 (Valid for round trips this date, or for scheduled repetitive trips for 60 days from the date signed below.)  Origin: [place]  Destination: [place] [id number] [id number]  Is the Patient's stay covered under Medicare Part A (PPS/DRG?)Yes  Closest appropriate facility? Yes  If this a hosp-hosp transfer? No  Is this a hospice patient? No    MEDICAL NECESSITY QUESTIONAIRE    Ambulance Transportation is medically necessary only if other means of transportation are contraindicated or would be potentially harmful to the patient.  To meet this requirement, the patient must be either \"bed confined\" or suffer from a condition such that transport by means other than an ambulance is contraindicated by the patient's condition.  The following questions must be answered by the healthcare professional signing below for this form to be valid:     1) Describe the MEDICAL CONDITION (physical and/or mental) of this patient AT THE TIME OF AMBULANCE TRANSPORT that requires the patient to be transported in an ambulance, and why transport by other means is contraindicated by the patient's condition: pt is unable to ambulate independently and requires max assist x2 for transfers per staff RN  Past Medical History:   Diagnosis Date    Acute metabolic encephalopathy 6/24/2022    Anxiety     Arthritis     Benign essential hypertension 08/20/2014    Bleeding disorder     Depression     Diabetes     Diabetes mellitus, type 2     Disc degeneration, lumbar     Headache, tension-type     Hyperlipidemia     Hypothyroidism     Neuropathy     Osteoporosis 09/09/2015    Peripheral neuropathy     Rotator cuff tear, left     Scoliosis     Shoulder pain     LEFT, TORN ROTATOR CUFF S/P FALL    Spinal stenosis       Past Surgical History: " "  Procedure Laterality Date    APPENDECTOMY      BILATERAL BREAST REDUCTION Bilateral 08/2015    CATARACT EXTRACTION  03/2015    CHOLECYSTECTOMY WITH INTRAOPERATIVE CHOLANGIOGRAM N/A 3/27/2022    Procedure: CHOLECYSTECTOMY LAPAROSCOPIC INTRAOPERATIVE CHOLANGIOGRAM;  Surgeon: Aiyana Hill MD;  Location: [facility] OR;  Service: General;  Laterality: N/A;    COLONOSCOPY  06/05/2015    WNL    ERCP N/A 2/25/2022    Procedure: ENDOSCOPIC RETROGRADE CHOLANGIOPANCREATOGRAPHY with sphincterotomy and balloon sweep;  Surgeon: Chilo Wilhelm MD;  Location: [facility] ENDOSCOPY;  Service: Gastroenterology;  Laterality: N/A;  PRE/POST - CBD stones    ERCP N/A 3/28/2022    Procedure: ENDOSCOPIC RETROGRADE CHOLANGIOPANCREATOGRAPHY WITH SPHINCTEROTOMY AND BALLOON SWEEP;  Surgeon: Chilo Wilhelm MD;  Location: [facility] ENDOSCOPY;  Service: Gastroenterology;  Laterality: N/A;  PRE: COMMON DUCT STONE  POST: COMMON DUCT STONE    KYPHOPLASTY      REDUCTION MAMMAPLASTY      TONSILLECTOMY        2) Is this patient \"bed confined\" as defined below?No    To be \"bed confined\" the patient must satisfy all three of the following criteria:  (1) unable to get up from bed without assistance; AND (2) unable to ambulate;  AND (3) unable to sit in a chair or wheelchair.  3) Can this patient safely be transported by car or wheelchair van (I.e., may safely sit during transport, without an attendant or monitoring?)No   4. In addition to completing questions 1-3 above, please check any of the following conditions that apply*:          *Note: supporting documentation for any boxes checked must be maintained in the patient's medical records Moderate/severe pain on movement, Medical attendant required, and Unable to tolerate seated position for time needed to transport      SIGNATURE OF PHYSICIAN OR OTHER AUTHORIZED HEALTHCARE PROFESSIONAL    I certify that the above information is true and correct based on my evaluation of this patient, and " represent that the patient requires transport by ambulance and that other forms of transport are contraindicated.  I understand that this information will be used by the Centers for Medicare and Medicaid Services (CMS) to support the determiniation of medical necessity for ambulance services, and I represent that I have personal knowledge of the patient's condition at the time of transport.        If this box is checked, I also certify that the patient is physically or mentally incapable of signing the ambulance service's claim form and that the institution with which I am affiliated has furnished care, services or assistance to the patient.  My signature below is made on behalf of the patient pursuant to 42 .36(b)(4). In accordance with 42 .37, the specific reason(s) that the patient is physically or mentally incapable of signing the claim for is as follows: n/a    Signature of Physician or Healthcare Professional  Date/Time:   7/30/23  0853     (For Scheduled repetitive transport, this form is not valid for transports performed more than 60 days after this date).                                                                                                                                            --------------------------------------------------------------------------------------------  Printed Name and Credentials of Physician or Authorized Healthcare Professional     *Form must be signed by patient's attending physician for scheduled, repetitive transports,.  For non-repetitive ambulance transports, if unable to obtain the signature of the attending physician, any of the following may sign (please select below):     Physician  Clinical Nurse Specialist  Registered Nurse     Physician Assistant  Discharge Planner  Licensed Practical Nurse     Nurse Practitioner

## 2023-07-30 NOTE — PROGRESS NOTES
Continued Stay Note  [facility]     Patient Name: Halie Guidry  MRN: [id number]  Today's Date: 7/30/2023    Admit Date: 7/21/2023    Plan: DC to skilled care at [place] via [facility]   Discharge Plan       Row Name 07/30/23 1707       Plan    Plan Comments [facility] [place] [facility] rescheduled to 2300, update to RN......Aden RN                   Discharge Codes    No documentation.                 Expected Discharge Date and Time       Expected Discharge Date Expected Discharge Time    Jul 30, 2023               Kenya Melendez, RN

## 2023-07-30 NOTE — PROGRESS NOTES
[facility]  Inpatient Progress Note    Reason for Follow Up: Pancreatic head mass    Subjective     Interval History:   No overnight events, denies jaundice epigastric pain  She is due for discharge to rehab today    Current Facility-Administered Medications:     amLODIPine (NORVASC) tablet 5 mg, 5 mg, Oral, Daily, Yanet Bartlett APRN, 5 mg at 07/30/23 1033    atorvastatin (LIPITOR) tablet 80 mg, 80 mg, Oral, Nightly, Yanet Bartlett APRN, 80 mg at 07/29/23 2045    sennosides-docusate (PERICOLACE) 8.6-50 MG per tablet 2 tablet, 2 tablet, Oral, BID, 2 tablet at 07/29/23 2045 **AND** polyethylene glycol (MIRALAX) packet 17 g, 17 g, Oral, Daily PRN, 17 g at 07/24/23 2144 **AND** bisacodyl (DULCOLAX) EC tablet 5 mg, 5 mg, Oral, Daily PRN **AND** bisacodyl (DULCOLAX) suppository 10 mg, 10 mg, Rectal, Daily PRN, Yanet Bartlett APRN    bisoprolol (ZEBeta) tablet 5 mg, 5 mg, Oral, Daily, Yanet Bartlett APRN, 5 mg at 07/30/23 1033    chlorthalidone (HYGROTON) tablet 25 mg, 25 mg, Oral, Daily, Yanet Bartlett APRN, 25 mg at 07/30/23 1033    dextrose (D50W) (25 g/50 mL) IV injection 25 g, 25 g, Intravenous, Q15 Min PRN, Yanet Bartlett APRN    dextrose (GLUTOSE) oral gel 15 g, 15 g, Oral, Q15 Min PRN, Yanet Bartlett APRN    DULoxetine (CYMBALTA) DR capsule 30 mg, 30 mg, Oral, Daily, Yanet Bartlett APRN, 30 mg at 07/30/23 1030    ferrous sulfate tablet 325 mg, 325 mg, Oral, Daily With Breakfast, Napoleon Gutierrez MD, 325 mg at 07/30/23 1029    glucagon (GLUCAGEN) injection 1 mg, 1 mg, Intramuscular, Q15 Min PRN, Yanet Bartlett, SERGE    HYDROcodone-acetaminophen (NORCO) 5-325 MG per tablet 1 tablet, 1 tablet, Oral, Q6H PRN, Kelechi Sanchez MD, 1 tablet at 07/30/23 0557    hydrocortisone-bacitracin-zinc oxide-nystatin (MAGIC BARRIER) ointment 1 application , 1 application , Topical, BID, Efe Moses MD, 1 application   at 07/30/23 1029    insulin glargine (LANTUS, SEMGLEE) injection 65 Units, 65 Units, Subcutaneous, Nightly, Kelechi Sanchez MD, 65 Units at 07/29/23 2222    insulin lispro (HUMALOG/ADMELOG) injection 18 Units, 18 Units, Subcutaneous, TID With Meals, Efe Moses MD, 18 Units at 07/30/23 1028    insulin lispro (HUMALOG/ADMELOG) injection 3-14 Units, 3-14 Units, Subcutaneous, 4x Daily With Meals & Nightly, Yanet Bartlett APRN, 10 Units at 07/30/23 1028    levothyroxine (SYNTHROID, LEVOTHROID) tablet 112 mcg, 112 mcg, Oral, Once per day on Mon Tue Wed Thu Fri Sat, Yanet Bartlett APRN, 112 mcg at 07/29/23 1007    levothyroxine (SYNTHROID, LEVOTHROID) tablet 168 mcg, 168 mcg, Oral, Once per day on Sun, Yanet Bartlett APRN, 168 mcg at 07/23/23 0600    lisinopril (PRINIVIL,ZESTRIL) tablet 40 mg, 40 mg, Oral, Daily, Yanet Bartlett APRN, 40 mg at 07/30/23 1032    melatonin tablet 5 mg, 5 mg, Oral, Nightly PRN, Ceci Ram APRN, 5 mg at 07/27/23 2221    pantoprazole (PROTONIX) EC tablet 40 mg, 40 mg, Oral, Q AM, Yanet Bartlett APRN, 40 mg at 07/29/23 0607    pregabalin (LYRICA) capsule 50 mg, 50 mg, Oral, TID, Yanet Bartlett APRN, 50 mg at 07/30/23 1031    sodium chloride 0.9 % flush 10 mL, 10 mL, Intravenous, Q12H, Yanet Bartlett APRN, 10 mL at 07/30/23 1032    sodium chloride 0.9 % flush 10 mL, 10 mL, Intravenous, PRN, Yanet Bartlett APRN    sodium chloride 0.9 % infusion 40 mL, 40 mL, Intravenous, PRN, Yanet Bartlett APRN    terazosin (HYTRIN) capsule 1 mg, 1 mg, Oral, Nightly, Yanet Bartlett APRN, 1 mg at 07/29/23 2222    vitamin B-12 (CYANOCOBALAMIN) tablet 1,000 mcg, 1,000 mcg, Oral, Daily, Yanet Bartlett APRN, 1,000 mcg at 07/30/23 1029  Review of Systems:    There is weakness fatigue all other systems reviewed and negative    Objective     Vital Signs  Temp:  [97 øF (36.1 øC)-97.4 øF (36.3 øC)] 97 øF  (36.1 øC)  Heart Rate:  [60-65] 65  Resp:  [16-18] 16  BP: (133-148)/(55-65) 133/65  Body mass index is 31.21 kg/mý.    Intake/Output Summary (Last 24 hours) at 7/30/2023 1320  Last data filed at 7/30/2023 1145  Gross per 24 hour   Intake 650 ml   Output 1500 ml   Net -850 ml     I/O this shift:  In: 430 [P.O.:430]  Out: 500 [Urine:500]     Physical Exam:   General: patient awake, alert and cooperative   Eyes: Normal lids and lashes, no scleral icterus   Neck: supple, normal ROM   Skin: warm and dry, not jaundiced   Cardiovascular: regular rhythm and rate, no murmurs auscultated   Pulm: clear to auscultation bilaterally, regular and unlabored   Abdomen: soft, nontender, nondistended; normal bowel sounds   Extremities: no rash or edema   Psychiatric: Normal mood and behavior; memory intact     Results Review:     I reviewed the patient's new clinical results.    Results from last 7 days   Lab Units 07/30/23  0551 07/29/23  0432 07/28/23  0558   WBC 10*3/mm3 9.78 9.09 8.05   HEMOGLOBIN g/dL 11.2* 11.1* 10.9*   HEMATOCRIT % 34.8 33.8* 32.8*   PLATELETS 10*3/mm3 309 293 283     Results from last 7 days   Lab Units 07/30/23  0551 07/29/23  0432 07/28/23  0558   SODIUM mmol/L 134* 137 137   POTASSIUM mmol/L 4.1 4.0 3.7   CHLORIDE mmol/L 100 104 101   CO2 mmol/L 21.3* 21.1* 23.4   BUN mg/dL 57* 47* 47*   CREATININE mg/dL 1.48* 1.17* 1.29*   CALCIUM mg/dL 9.2 8.7 9.1   GLUCOSE mg/dL 294* 128* 139*         Lab Results   Lab Value Date/Time    LIPASE 50 05/26/2022 2131    LIPASE 70 (H) 03/26/2022 1018    LIPASE 33 02/23/2022 0125       Radiology:  CT Abdomen Pelvis With & Without Contrast   Final Result   1. There is a hyperenhancing mass at the pancreatic head measuring   approximately 3.6 cm and there is a 2.8 cm exophytic component or node   adjacently. The appearance is suspicious for a neuroendocrine tumor. The   mass significantly narrows the downstream portion of the common bile   duct, but there is no intrahepatic  biliary dilatation and there is   pneumobilia. There is mild dilatation of the main pancreatic duct. For   tissue diagnosis, endoscopic ultrasound with FNA is recommended.   2. There are a few slightly hyperdense right renal nodules which likely   represent proteinaceous cysts. There are no enhancing or suspicious   renal lesions.       I called the patient's nurse this morning and asked her to notify the   patient's physician to read this report today morning.       This report was finalized on 7/27/2023 8:31 AM by Dr. Diane Munoz M.D.          CT Chest With Contrast Diagnostic   Final Result   1. No suspicious right hilar or pulmonary mass. Mild right basilar   atelectasis.   2. Numerous renal cortical lesions partly imaged. There is at least 1   intermediate density lesion. At this time, follow-up with pre and   postcontrast CT or MR renal mass protocol is recommended for   characterization on a nonemergent basis.   3. Small sliding hiatal hernia.       Radiation dose reduction techniques were utilized, including automated   exposure control and exposure modulation based on body size.       This report was finalized on 7/25/2023 12:49 PM by Dr. Gennaro Henley M.D.          XR Ankle 3+ View Right   Final Result      XR Knee 1 or 2 View Right   Final Result      XR Tibia Fibula 2 View Right   Final Result      XR Chest 1 View   Final Result   There is mild right lung base atelectasis versus pneumonia.   Soft tissue fullness in the region of the right hilum is also suspected.   A follow-up CT of the chest with contrast is suggested.       This report was finalized on 7/21/2023 8:02 PM by Dr. Steven Garcia M.D.              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  Pancreatic head mass noted.  APRN has been in discussion with Dr. Alireza Borrego with [facility] to try and arrange EUS with FNA for further evaluation of pancreatic head mass on an Outpatient Basis.  The biliary specialist is in agreement.  Additional lab work-up including pancreatic polypeptide, proinsulin, insulin, and chromogranin A levels are still pending.  Continue current dietary regimen and supportive care measures  Additional recommendations pending scheduling EUS   Patient reports plans to be transferred to rehab for approximately 3 weeks following her hospital discharge.  She is aware she needs to have EUS scheduled after rehab  APRN discussed plan of care with patient's nurse and that patient will be having outpatient EUS with FNA with Dr. Borrego, which is to be scheduled.  He will perform the EUS and he will get it scheduled  We agree with discharge to rehab today    I discussed the patients findings and my recommendations with patient and nursing staff.    Josse Oropeza MD

## 2023-07-31 LAB
CGA SERPL-MCNC: 2296 NG/ML (ref 0–101.8)
GASTRIN SERPL-MCNC: 236 PG/ML (ref 0–115)
PROINSULIN SERPL-SCNC: 6.3 PMOL/L (ref 0–10)

## 2023-07-31 NOTE — CASE MANAGEMENT/SOCIAL WORK
Case Management Discharge Note      Final Note: dc'd to skilled bed at [place] via [facility]         Selected Continued Care - Discharged on 7/30/2023 Admission date: 7/21/2023 - Discharge disposition: Skilled Nursing Facility (DC - External)      Destination Coordination complete.      Service Provider Selected Services Address Phone Fax Patient Preferred    [facility] [address] [address] [phone] --              Durable Medical Equipment    No services have been selected for the patient.                Dialysis/Infusion    No services have been selected for the patient.                Home Medical Care    No services have been selected for the patient.                Therapy    No services have been selected for the patient.                Community Resources    No services have been selected for the patient.                Community & DME    No services have been selected for the patient.                    Selected Continued Care - Prior Encounters Includes continued care and service providers with selected services from prior encounters from 4/22/2023 to 7/30/2023      Discharged on 5/15/2023 Admission date: 5/12/2023 - Discharge disposition: Home-Health Care [facility]      Home Medical Care       Service Provider Selected Services Address Phone Fax Patient Preferred    [facility] [address], [address] [address] [phone] --                          Transportation Services  Ambulance: [facility]    Final Discharge Disposition Code: 03 - skilled nursing facility (SNF)

## 2023-08-01 ENCOUNTER — TELEHEALTH PROVIDED IN PATIENT'S HOME (OUTPATIENT)
Dept: URBAN - METROPOLITAN AREA TELEHEALTH 4 | Facility: TELEHEALTH | Age: 83
End: 2023-08-01

## 2023-08-01 DIAGNOSIS — K86.89 OTHER SPECIFIED DISEASES OF PANCREAS: ICD-10-CM

## 2023-08-01 DIAGNOSIS — R19.7 DIARRHEA, UNSPECIFIED: ICD-10-CM

## 2023-08-01 PROCEDURE — 99442: CPT | Mod: 95 | Performed by: INTERNAL MEDICINE

## 2023-08-02 LAB
GLUCAGON SERPL-MCNC: 143 PG/ML (ref 13–159)
VIP SERPL-MCNC: 43.2 PG/ML (ref 0–58.8)

## 2023-08-04 ENCOUNTER — ON CAMPUS - OUTPATIENT (OUTPATIENT)
Dept: URBAN - METROPOLITAN AREA HOSPITAL 77 | Facility: HOSPITAL | Age: 83
End: 2023-08-04

## 2023-08-04 DIAGNOSIS — K29.70 GASTRITIS, UNSPECIFIED, WITHOUT BLEEDING: ICD-10-CM

## 2023-08-04 DIAGNOSIS — K44.9 DIAPHRAGMATIC HERNIA WITHOUT OBSTRUCTION OR GANGRENE: ICD-10-CM

## 2023-08-04 DIAGNOSIS — K86.89 OTHER SPECIFIED DISEASES OF PANCREAS: ICD-10-CM

## 2023-08-04 LAB — PANC POLYPEPT SERPL-MCNC: 643.6 PG/ML (ref 0–418)

## 2023-08-04 PROCEDURE — 43239 EGD BIOPSY SINGLE/MULTIPLE: CPT | Mod: 59 | Performed by: INTERNAL MEDICINE

## 2023-08-04 PROCEDURE — 43242 EGD US FINE NEEDLE BX/ASPIR: CPT | Performed by: INTERNAL MEDICINE

## 2023-08-08 LAB — SOMATOSTAT PLAS-MCNC: 21 PG/ML

## 2023-08-15 ENCOUNTER — TELEPHONE (OUTPATIENT)
Dept: FAMILY MEDICINE CLINIC | Facility: CLINIC | Age: 83
End: 2023-08-15
Payer: MEDICARE

## 2023-08-15 ENCOUNTER — READMISSION MANAGEMENT (OUTPATIENT)
Dept: CALL CENTER | Facility: HOSPITAL | Age: 83
End: 2023-08-15
Payer: MEDICARE

## 2023-08-15 NOTE — TELEPHONE ENCOUNTER
Forms for medical release request faxed to [facility] [phone] , COPY SCANNED IN CHART

## 2023-08-16 ENCOUNTER — TRANSITIONAL CARE MANAGEMENT TELEPHONE ENCOUNTER (OUTPATIENT)
Dept: CALL CENTER | Facility: HOSPITAL | Age: 83
End: 2023-08-16
Payer: MEDICARE

## 2023-08-16 NOTE — OUTREACH NOTE
Prep Survey      Flowsheet Row Responses   [employer] facility patient discharged from? Non-BH   Is LACE score < 7 ? Non-BH Discharge   Eligibility [facility] - Skilled   Date of Discharge 08/15/23   Discharge diagnosis Hypertensive chronic kidney disease with stage 1 through stage 4 chronic kidney disease, or unspecified chronic kidney disease   Does the patient have one of the following disease processes/diagnoses(primary or secondary)? Other   Prep survey completed? Yes            RIYA ORTIZ - Registered Nurse

## 2023-08-16 NOTE — OUTREACH NOTE
Call Center TCM Note      Flowsheet Row Responses   [facility] patient discharged from? Non-   Does the patient have one of the following disease processes/diagnoses(primary or secondary)? Other   TCM attempt successful? Yes   Call start time 1119   Call end time 1121   Discharge diagnosis Hypertensive chronic kidney disease with stage 1 through stage 4 chronic kidney disease, or unspecified chronic kidney disease   Meds reviewed with patient/caregiver? Yes   Is the patient having any side effects they believe may be caused by any medication additions or changes? No   Does the patient have all medications ordered at discharge? Yes   Is the patient taking all medications as directed (includes completed medication regime)? Yes   Does the patient have an appointment with their PCP within 7-14 days of discharge? No appointments available   Nursing Interventions Routed TCM call to PCP office, PCP office requested to make appointment - message sent   Home health comments [facility]  to resume care   Has all DME been delivered? Yes   Psychosocial issues? No   Did the patient receive a copy of their discharge instructions? Yes   Nursing interventions Reviewed instructions with patient   What is the patient's perception of their health status since discharge? Improving   Is the patient/caregiver able to teach back signs and symptoms related to disease process for when to call PCP? Yes   Is the patient/caregiver able to teach back signs and symptoms related to disease process for when to call 911? Yes   Is the patient/caregiver able to teach back the hierarchy of who to call/visit for symptoms/problems? PCP, Specialist, Home health nurse, Urgent Care, ED, 911 Yes   If the patient is a current smoker, are they able to teach back resources for cessation? Not a smoker   TCM call completed? Yes   Wrap up additional comments D/C DX: Hypertensive CKD w/stage 1-4 CKD or unspecified CKD,  BLE edema,  significant decrease  in mobility - Per pt she is better,states she is getting around better. States all needed DME and medications in home. [facility] has been in touch and will resume care. PCP Dr Mead does not have any available times I could access to sched TCM APPT by 08/29/2023. Pt d/c from [facility] 07/30/2023, from SNF 08/15/2023.   Call end time 1121            Kelsey Victor MA    8/16/2023, 11:26 EDT

## 2023-08-18 ENCOUNTER — TELEPHONE (OUTPATIENT)
Dept: ONCOLOGY | Facility: CLINIC | Age: 83
End: 2023-08-18
Payer: MEDICARE

## 2023-08-18 NOTE — TELEPHONE ENCOUNTER
----- Message from Napoleon Gutierrez MD sent at 8/18/2023  8:10 AM EDT -----  Can you see if she got scheduled for her pancreas biopsy     Statement Selected

## 2023-08-21 DIAGNOSIS — E03.9 PRIMARY HYPOTHYROIDISM: ICD-10-CM

## 2023-08-21 RX ORDER — LEVOTHYROXINE SODIUM 112 UG/1
TABLET ORAL
Qty: 96 TABLET | Refills: 0 | Status: SHIPPED | OUTPATIENT
Start: 2023-08-21 | End: 2023-08-21 | Stop reason: SDUPTHER

## 2023-08-21 RX ORDER — LEVOTHYROXINE SODIUM 112 UG/1
TABLET ORAL
Qty: 96 TABLET | Refills: 0 | Status: SHIPPED | OUTPATIENT
Start: 2023-08-21

## 2023-08-22 ENCOUNTER — TELEPHONE (OUTPATIENT)
Dept: GASTROENTEROLOGY | Facility: CLINIC | Age: 83
End: 2023-08-22
Payer: MEDICARE

## 2023-08-22 DIAGNOSIS — D3A.8 PRIMARY PANCREATIC NEUROENDOCRINE TUMOR: Primary | ICD-10-CM

## 2023-08-22 NOTE — TELEPHONE ENCOUNTER
RN called and left detailed voicemail regarding an urgent referral placed for patient to see oncology, Dr. Francois with the [facility] here at [facility]. RN left direct contact number for patient to callback with any questions, concerns or additional information. EL

## 2023-08-24 ENCOUNTER — OFFICE VISIT (OUTPATIENT)
Dept: ENDOCRINOLOGY | Age: 83
End: 2023-08-24
Payer: MEDICARE

## 2023-08-24 VITALS
DIASTOLIC BLOOD PRESSURE: 70 MMHG | OXYGEN SATURATION: 99 % | WEIGHT: 183 LBS | SYSTOLIC BLOOD PRESSURE: 126 MMHG | BODY MASS INDEX: 27.74 KG/M2 | HEIGHT: 68 IN | HEART RATE: 86 BPM

## 2023-08-24 DIAGNOSIS — Z79.4 TYPE 2 DIABETES MELLITUS WITH HYPERGLYCEMIA, WITH LONG-TERM CURRENT USE OF INSULIN: Primary | ICD-10-CM

## 2023-08-24 DIAGNOSIS — E78.5 HYPERLIPIDEMIA, UNSPECIFIED HYPERLIPIDEMIA TYPE: ICD-10-CM

## 2023-08-24 DIAGNOSIS — E11.65 TYPE 2 DIABETES MELLITUS WITH HYPERGLYCEMIA, WITH LONG-TERM CURRENT USE OF INSULIN: Primary | ICD-10-CM

## 2023-08-24 DIAGNOSIS — I12.9 HYPERTENSIVE KIDNEY DISEASE WITH STAGE 3A CHRONIC KIDNEY DISEASE: ICD-10-CM

## 2023-08-24 DIAGNOSIS — N18.31 HYPERTENSIVE KIDNEY DISEASE WITH STAGE 3A CHRONIC KIDNEY DISEASE: ICD-10-CM

## 2023-08-24 DIAGNOSIS — E03.9 PRIMARY HYPOTHYROIDISM: ICD-10-CM

## 2023-08-24 DIAGNOSIS — D3A.8 PRIMARY PANCREATIC NEUROENDOCRINE TUMOR: ICD-10-CM

## 2023-08-24 PROCEDURE — 3074F SYST BP LT 130 MM HG: CPT | Performed by: NURSE PRACTITIONER

## 2023-08-24 PROCEDURE — 99214 OFFICE O/P EST MOD 30 MIN: CPT | Performed by: NURSE PRACTITIONER

## 2023-08-24 PROCEDURE — 3078F DIAST BP <80 MM HG: CPT | Performed by: NURSE PRACTITIONER

## 2023-08-24 RX ORDER — INSULIN LISPRO 100 [IU]/ML
INJECTION, SOLUTION INTRAVENOUS; SUBCUTANEOUS
Refills: 12
Start: 2023-08-24

## 2023-08-24 NOTE — PATIENT INSTRUCTIONS
Increase Toujeo 90 units daily  Increase Admelog 20 units plus 2 units per 50 over 150 before meals   Trulicity 1.5 mg weekly stop to see if diarrhea goes away  Continue with blood sugar checks

## 2023-08-24 NOTE — PROGRESS NOTES
Chief Complaint  Chief Complaint   Patient presents with    Diabetes     Type 2: Pt doesn't have meter today, checks bs 3x a day, is up to date on eye exam, no hx of retinopathy, moderate neuropathy. Pt states that she has some questions about medications.        Subjective          History of Present Illness    Halie Guidry 83 y.o. presents for a follow-up evaluation for type 2 DM     She has been diabetic for more that 10 years     Pt is on the following medications for their DM: Toujeo 80 units daily, Admelog 14 units plus 2 units per 50 over 150 before meals and Trulicity 1.5 mg weekly        Pt complains of diarrhea and numbness and tingling in feet/hands    Denies constipation, chest pain, shortness of breath, vision changes or numbness and tingling in feet/hands.    Pt does not have a history of diabetic retinopathy.  Last eye exam was 06/23    Pt does have a history of nephropathy.  Patient is currently taking ACE - follows with Nephrology     Pt does have neuropathy.  Current takes Lyrica 50 mg TID for this condition per PCP     Pt does not have a history of CAD or CVA.    Last A1C in 06/23 was 11.80    Last microalbumin in 09/22 was positive          Blood Sugars    Blood glucoses are checked 3/day.    Fasting blood glucoses: 200s    Pre-meal blood glucoses: 200s    Pt has no episodes of hypoglycemia.          Hypothyroid     PT complains of diarrhea, hair loss, dry skin and cold intolerance.    Denies constipation, chest pain, palpitations and heat intolerance    Current treatment is levothyroxine 112 mcg 1 tablet daily    Last labs in 06/23 showed TSH 5.540 and FT4 1.30          Hyperlipidemia     Pt denies any muscle/body aches, chest pain or shortness of breath    Pt is currently taking atorvastatin 80 mg HS     Last lipid panel in 06/23 showed Total 138, HDL 33, LDL 82 and Triglycerides 130              Hypertension with CKD Stage 3a     Pt denies any chest pain, palpitations, shortness of  "breath or headache     Current regimen includes amlodipine 5 mg daily, bisoprolol 5 mg daily, chlorthalidone 25 mg daily, doxazosin 1 mg daily and lisinopril 40 mg daily                 Pancreatic Endocrine Tumor    Pt had a CT of ABD/Pelvis on 07/26/23 which showed There is a hyperenhancing mass at the pancreatic head measuring approximately 3.6 cm and there is a 2.8 cm exophytic component or node adjacently. The appearance is suspicious for a neuroendocrine tumor. The mass significantly narrows the downstream portion of the common bile duct, but there is no intrahepatic biliary dilatation and there is pneumobilia. There is mild dilatation of the main pancreatic duct. For tissue diagnosis, endoscopic ultrasound with FNA is recommended      FNA on 08/04/23 showed Pancreatic Endocrine Tumor    Pt has been having diarrhea for about a year - which was also the time frame she started Trulicity    She has appointment with Dr. Garcia on 08/29/23              Other History     Wound on left foot - saw wound care            I have reviewed the patient's allergies, medicines, past medical hx, family hx and social hx.    Objective   Vital Signs:   /70   Pulse 86   Ht 172.7 cm (67.99\")   Wt 83 kg (183 lb)   SpO2 99%   BMI 27.83 kg/mý       Physical Exam   Physical Exam  Constitutional:       General: She is not in acute distress.     Appearance: Normal appearance. She is not diaphoretic.   HENT:      Head: Normocephalic and atraumatic.   Eyes:      General:         Right eye: No discharge.         Left eye: No discharge.   Skin:     General: Skin is warm and dry.   Neurological:      Mental Status: She is alert.   Psychiatric:         Mood and Affect: Mood normal.         Behavior: Behavior normal.                  Results Review:   Hemoglobin A1C   Date Value Ref Range Status   06/02/2023 11.80 (H) 4.80 - 5.60 % Final     Comment:     Hemoglobin A1C Ranges:  Increased Risk for Diabetes  5.7% to 6.4%  Diabetes        "              >= 6.5%  Diabetic Goal                < 7.0%     09/01/2022 7.70 (H) 4.80 - 5.60 % Final     Total Cholesterol   Date Value Ref Range Status   06/23/2022 120 0 - 200 mg/dL Final     Triglycerides   Date Value Ref Range Status   06/02/2023 130 0 - 150 mg/dL Final   06/23/2022 127 0 - 150 mg/dL Final     HDL Cholesterol   Date Value Ref Range Status   06/02/2023 33 (L) 40 - 60 mg/dL Final   06/23/2022 34 (L) 40 - 60 mg/dL Final     LDL Chol Calc (NIH)   Date Value Ref Range Status   06/02/2023 82 0 - 100 mg/dL Final     VLDL Cholesterol Gerald   Date Value Ref Range Status   06/02/2023 23 5 - 40 mg/dL Final     LDL/HDL Ratio   Date Value Ref Range Status   06/23/2022 1.78  Final         Assessment and Plan {CC Problem List  Visit Diagnosis  ROS  Review (Popup)  Health Maintenance  Quality  BestPractice  Medications  SmartSets  SnapShot Encounters  Media :23  Diagnoses and all orders for this visit:    1. Type 2 diabetes mellitus with hyperglycemia, with long-term current use of insulin (Primary)  -     Fructosamine  -     insulin lispro (HUMALOG/ADMELOG) 100 UNIT/ML injection; 20 units plus 2 units per 50 over 150 before meals; Refill: 12    Increase Toujeo 90 units daily  Increase Admelog 20 units plus 2 units per 50 over 150 before meals   Stop Trulicity 1.5 mg weekly  Continue with blood sugar checks  Check Fructosamine today      2. Primary hypothyroidism  -     TSH  -     T4, Free    Never increase to 1.5 tablets on Sunday  Recheck labs today      3. Hyperlipidemia, unspecified hyperlipidemia type    Continue with statin      4. Hypertensive kidney disease with stage 3a chronic kidney disease    Stable  Continue with current medication regimen  Defer management to PCP/Nephrology      5. Primary pancreatic neuroendocrine tumor      Pt was recently dx with this - biopsy 08/04/23  Advised that I would speak with Dr. Guevara to see what, if anything, that we needed to do on our end, but that she  Needs to keep her appointment with Dr. Garcia on 29th of this month.                Recheck just thyroid labs today  RTC in 2 months with me and 4 months with Dr. Guevara      Follow Up     Patient was given instructions and counseling regarding her condition or for health maintenance advice. Please see specific information pulled into the AVS if appropriate.              Lilia Orona, SERGE  08/24/23

## 2023-08-25 ENCOUNTER — TELEPHONE (OUTPATIENT)
Dept: ONCOLOGY | Facility: CLINIC | Age: 83
End: 2023-08-25
Payer: MEDICARE

## 2023-08-25 LAB
FRUCTOSAMINE SERPL-SCNC: 354 UMOL/L (ref 0–285)
SPECIMEN STATUS: NORMAL
T4 FREE SERPL-MCNC: 1.2 NG/DL (ref 0.93–1.7)
TSH SERPL DL<=0.005 MIU/L-ACNC: 1.51 UIU/ML (ref 0.27–4.2)
WRITTEN AUTHORIZATION: NORMAL

## 2023-08-25 NOTE — TELEPHONE ENCOUNTER
----- Message from Napoleon Gutierrez MD sent at 8/25/2023  7:33 AM EDT -----  I was seeing this pt in hospital for w/u pancreatic tumor but she was put with Jose as new pt.  I ca see next week Bhumi 2u since I know her

## 2023-08-28 ENCOUNTER — CONSULT (OUTPATIENT)
Dept: ONCOLOGY | Facility: CLINIC | Age: 83
End: 2023-08-28
Payer: MEDICARE

## 2023-08-28 ENCOUNTER — LAB (OUTPATIENT)
Dept: LAB | Facility: HOSPITAL | Age: 83
End: 2023-08-28
Payer: MEDICARE

## 2023-08-28 ENCOUNTER — TELEPHONE (OUTPATIENT)
Dept: FAMILY MEDICINE CLINIC | Facility: CLINIC | Age: 83
End: 2023-08-28

## 2023-08-28 VITALS
BODY MASS INDEX: 29.25 KG/M2 | HEIGHT: 68 IN | SYSTOLIC BLOOD PRESSURE: 148 MMHG | OXYGEN SATURATION: 98 % | WEIGHT: 193 LBS | HEART RATE: 76 BPM | DIASTOLIC BLOOD PRESSURE: 69 MMHG | TEMPERATURE: 98 F | RESPIRATION RATE: 16 BRPM

## 2023-08-28 DIAGNOSIS — C7A.8 PRIMARY MALIGNANT NEUROENDOCRINE TUMOR OF PANCREAS: ICD-10-CM

## 2023-08-28 DIAGNOSIS — I10 ACCELERATED HYPERTENSION: Chronic | ICD-10-CM

## 2023-08-28 DIAGNOSIS — D3A.8 PRIMARY PANCREATIC NEUROENDOCRINE TUMOR: Primary | ICD-10-CM

## 2023-08-28 PROBLEM — Z79.899 ENCOUNTER FOR LONG-TERM (CURRENT) USE OF HIGH-RISK MEDICATION: Status: ACTIVE | Noted: 2023-08-28

## 2023-08-28 LAB
BASOPHILS # BLD AUTO: 0.11 10*3/MM3 (ref 0–0.2)
BASOPHILS NFR BLD AUTO: 0.9 % (ref 0–1.5)
DEPRECATED RDW RBC AUTO: 48 FL (ref 37–54)
EOSINOPHIL # BLD AUTO: 0.31 10*3/MM3 (ref 0–0.4)
EOSINOPHIL NFR BLD AUTO: 2.5 % (ref 0.3–6.2)
ERYTHROCYTE [DISTWIDTH] IN BLOOD BY AUTOMATED COUNT: 15.5 % (ref 12.3–15.4)
HCT VFR BLD AUTO: 40 % (ref 34–46.6)
HGB BLD-MCNC: 12.8 G/DL (ref 12–15.9)
IMM GRANULOCYTES # BLD AUTO: 0.14 10*3/MM3 (ref 0–0.05)
IMM GRANULOCYTES NFR BLD AUTO: 1.1 % (ref 0–0.5)
LYMPHOCYTES # BLD AUTO: 2.4 10*3/MM3 (ref 0.7–3.1)
LYMPHOCYTES NFR BLD AUTO: 19 % (ref 19.6–45.3)
MCH RBC QN AUTO: 27.7 PG (ref 26.6–33)
MCHC RBC AUTO-ENTMCNC: 32 G/DL (ref 31.5–35.7)
MCV RBC AUTO: 86.6 FL (ref 79–97)
MONOCYTES # BLD AUTO: 1.02 10*3/MM3 (ref 0.1–0.9)
MONOCYTES NFR BLD AUTO: 8.1 % (ref 5–12)
NEUTROPHILS NFR BLD AUTO: 68.4 % (ref 42.7–76)
NEUTROPHILS NFR BLD AUTO: 8.67 10*3/MM3 (ref 1.7–7)
NRBC BLD AUTO-RTO: 0 /100 WBC (ref 0–0.2)
PLATELET # BLD AUTO: 211 10*3/MM3 (ref 140–450)
PMV BLD AUTO: 10.1 FL (ref 6–12)
RBC # BLD AUTO: 4.62 10*6/MM3 (ref 3.77–5.28)
WBC NRBC COR # BLD: 12.65 10*3/MM3 (ref 3.4–10.8)

## 2023-08-28 PROCEDURE — 36415 COLL VENOUS BLD VENIPUNCTURE: CPT

## 2023-08-28 PROCEDURE — 85025 COMPLETE CBC W/AUTO DIFF WBC: CPT

## 2023-08-28 RX ORDER — BISOPROLOL FUMARATE 5 MG/1
TABLET, FILM COATED ORAL
Qty: 30 TABLET | Refills: 0 | Status: SHIPPED | OUTPATIENT
Start: 2023-08-28

## 2023-08-28 NOTE — PROGRESS NOTES
.REASON FOR FOLLOW-UP:   pancreatic neuroendocrine tumor    History of Present Illness    This is a pleasant 83-year-old woman with type 2 diabetes, hypothyroidism, hypertension, stage IIIb CKD, anxiety disorder initially admitted on 7/21/2023 for generalized weakness and difficulty ambulating about her house.  She had a chest x-ray performed on 7/21/2023 which showed possible soft tissue in the right hilum and a follow-up CT chest was performed 7/24/2023 showing numerous mediastinal and right hilar lymph nodes predominantly calcified favored to represent prior granulomatous disease.  Visualized upper abdomen showed pneumobilia, calcified splenic granulomas, bilateral renal cortical lesions, intermediate density lesion upper pole of the right kidney 2 cm, small hyperdense lesion midpole of the kidney for which a CT pre and postcontrast renal protocol was recommended.  A CT abdomen/pelvis with and without contrast was performed on 7/26/23 which showed a slightly hyperdense slightly lobular partially exophytic nodule upper pole the right kidney with no postcontrast enhancement and a subcentimeter hyperdense nodule immediately stable in size.  There were no enhancing renal lesions.  Urinary bladder was thickened but nearly completely collapsed.  In the pancreatic head there was mild enhancement of a suspected mass 3.6 cm in diameter narrowing the common bile duct and a 2.8 x 2.2 cm lymph node or exophytic component inferiorly with mild dilation of the pancreatic duct.  The mass was suspicious for a neuroendocrine tumor radiographically.     The patient reports chronic diarrhea over the previous year or so but no weight loss or abdominal pain.  Blood sugars have been running elevated.  She denies facial flushing or hypoglycemic events.  Diarrhea has been worse over the past 2 to 3 weeks with multiple episodes of stool incontinence.    Laboratory studies showed a glucagon of 143, gastrin 236, somatostatin 21, CEA 3.09,  CA 19-9 44.3, chromogranin A 2296, proinsulin 6.3, insulin 7.3, pancreatic polypeptide 643.6.    After discharge the patient underwent an EGD on 8/4/2023 showing small hiatal hernia, erythema with erosions in the gastric antrum and body suggestive of erosive gastritis.  An endoscopic ultrasound was performed showing a pancreatic mass 3.5 x 3.0 cm in the pancreatic head and a second mass more circumscribed adjacently possibly communicating.  Fine-needle aspiration of the mass was performed showing a well-differentiated pancreatic neuroendocrine tumor    Past Medical History, Past Surgical History, Social History, Family History have been reviewed and are without significant changes except as mentioned.    Review of Systems   Constitutional:  Positive for activity change and fatigue. Negative for unexpected weight change.   HENT: Negative.     Respiratory: Negative.     Cardiovascular: Negative.    Gastrointestinal:  Positive for diarrhea.   Genitourinary: Negative.    Musculoskeletal:  Positive for gait problem. Negative for myalgias.   Neurological:  Positive for weakness. Negative for light-headedness.   Hematological: Negative.    Psychiatric/Behavioral:  Positive for dysphoric mood. Negative for confusion.         Medications:  The current medication list was reviewed in the EMR    ALLERGIES:    Allergies   Allergen Reactions    Sulfa Antibiotics Unknown - High Severity     Pt says it was a long time ago and I don't remember but it wasn't good.        Objective      There were no vitals filed for this visit.       No data to display                Physical Exam    CONSTITUTIONAL: pleasant well-developed adult woman  HEENT: no icterus, no thrush, moist membranes  LYMPH: no cervical or supraclavicular lad  CV: RRR, S1S2, no murmur  RESP: cta bilat, no wheezing, no rales  GI: soft, non-tender, no splenomegaly, +bs  MUSC: no edema, ambulates with a rolling walker  NEURO: alert and oriented x3, mild global  weakness  PSYCH: Dysphoric mood    RECENT LABS:  Hematology WBC   Date Value Ref Range Status   07/30/2023 9.78 3.40 - 10.80 10*3/mm3 Final   01/18/2021 9.4 3.4 - 10.8 x10E3/uL Final     RBC   Date Value Ref Range Status   07/30/2023 4.04 3.77 - 5.28 10*6/mm3 Final   01/18/2021 4.59 3.77 - 5.28 x10E6/uL Final     Hemoglobin   Date Value Ref Range Status   07/30/2023 11.2 (L) 12.0 - 15.9 g/dL Final     Hematocrit   Date Value Ref Range Status   07/30/2023 34.8 34.0 - 46.6 % Final     Platelets   Date Value Ref Range Status   07/30/2023 309 140 - 450 10*3/mm3 Final     Lab Results   Component Value Date    GLUCOSE 294 (H) 07/30/2023    BUN 57 (H) 07/30/2023    CREATININE 1.48 (H) 07/30/2023    EGFRRESULT 42.3 (L) 06/02/2023    EGFR 35.0 (L) 07/30/2023    BCR 38.5 (H) 07/30/2023    K 4.1 07/30/2023    CO2 21.3 (L) 07/30/2023    CALCIUM 9.2 07/30/2023    PROTENTOTREF 7.2 06/02/2023    ALBUMIN 3.9 07/21/2023    BILITOT 0.4 07/21/2023    AST 18 07/21/2023    ALT 10 07/21/2023          CT ABD/PELVIS 7/26/23:  IMPRESSION:  1. There is a hyperenhancing mass at the pancreatic head measuring  approximately 3.6 cm and there is a 2.8 cm exophytic component or node  adjacently. The appearance is suspicious for a neuroendocrine tumor. The  mass significantly narrows the downstream portion of the common bile  duct, but there is no intrahepatic biliary dilatation and there is  pneumobilia. There is mild dilatation of the main pancreatic duct. For  tissue diagnosis, endoscopic ultrasound with FNA is recommended.  2. There are a few slightly hyperdense right renal nodules which likely  represent proteinaceous cysts. There are no enhancing or suspicious  renal lesions.       Assessment & Plan   *Well-differentiated pancreatic neuroendocrine tumor   Incidentally noted to have a hyperenhancing pancreatic head mass 3.6 cm with an adjacent 2.8 cm exophytic component or lymph node suspicious for NET radiographically by CT  7/26/2023  Laboratory studies showed a glucagon of 143, gastrin 236, somatostatin 21, CEA 3.09, CA 19-9 44.3, chromogranin A 2296, proinsulin 6.3, insulin 7.3, pancreatic polypeptide 643.6.  EGD on 8/4/2023 showing small hiatal hernia, erythema with erosions in the gastric antrum and body suggestive of erosive gastritis.  An endoscopic ultrasound was performed showing a pancreatic mass 3.5 x 3.0 cm in the pancreatic head and a second mass more circumscribed adjacently possibly communicating.  Fine-needle aspiration of the mass was performed showing a well-differentiated pancreatic neuroendocrine tumor     *Chronic diarrhea x1 year, hyperglycemia (but diabetic)     *Normocytic, normochromic anemia-hemoglobin 11.3 likely secondary to CKD 3   B12 572, folate 10.9, ferritin 32, iron sat 11%   Hemoglobin improved today 12.8      *Comorbidities include venous insufficiency, hypertension, hyperlipidemia, CKD, hypothyroidism, advanced age-PS ECOG 2     Oncology plan/recommendations:  Detectnet scan for staging of pancreatic neuroendocrine tumor-I doubt the patient is a candidate for surgery regardless given her age and PS  Increase PPI to twice daily dosing given the elevated gastrin level and findings of erosive gastritis on EGD   after the Detectnet is completed begin octreotide 20 mg LAR monthly which will hopefully help diarrhea  Follow-up 2 weeks after Detectnet to reassess symptoms and review results with the patient    I spent 50 minutes of time today on the case reviewing the patient's hospital records, lab results, imaging test, EGD results, endoscopic ultrasound results, pathology results, face-to-face time with the patient discussing diagnosis treatment options, writing orders, documenting care etc.                8/28/2023      CC:

## 2023-08-29 DIAGNOSIS — E03.9 PRIMARY HYPOTHYROIDISM: ICD-10-CM

## 2023-08-29 RX ORDER — LEVOTHYROXINE SODIUM 112 UG/1
TABLET ORAL
Qty: 96 TABLET | Refills: 0 | OUTPATIENT
Start: 2023-08-29

## 2023-08-29 NOTE — TELEPHONE ENCOUNTER
Rx Refill Note  Requested Prescriptions     Pending Prescriptions Disp Refills    levothyroxine (SYNTHROID, LEVOTHROID) 112 MCG tablet [Pharmacy Med Name: LEVOTHYROXINE 0.112MG (112MCG) TABS] 96 tablet 0     Sig: TAKE 1 TABLET BY MOUTH DAILY EXCEPT ON SUNDAY, TAKE 1 AND 1/2 TABLETS      Last office visit with prescribing clinician: 8/24/2023   Last telemedicine visit with prescribing clinician: Visit date not found   Next office visit with prescribing clinician: 10/24/2023                         Would you like a call back once the refill request has been completed: [] Yes [] No    If the office needs to give you a call back, can they leave a voicemail: [] Yes [] No    Grecia Jenkins  08/29/23, 08:09 EDT

## 2023-08-31 ENCOUNTER — TELEPHONE (OUTPATIENT)
Dept: ONCOLOGY | Facility: CLINIC | Age: 83
End: 2023-08-31
Payer: MEDICARE

## 2023-08-31 ENCOUNTER — TELEPHONE (OUTPATIENT)
Dept: FAMILY MEDICINE CLINIC | Facility: CLINIC | Age: 83
End: 2023-08-31

## 2023-08-31 RX ORDER — LANSOPRAZOLE 30 MG/1
30 CAPSULE, DELAYED RELEASE ORAL DAILY
Qty: 30 CAPSULE | Refills: 2 | Status: SHIPPED | OUTPATIENT
Start: 2023-08-31

## 2023-08-31 NOTE — TELEPHONE ENCOUNTER
Caller: Halie Guidry    Relationship: Self    Best call back number: [phone]    What is the best time to reach you: ASAP    Who are you requesting to speak with (clinical staff, provider,  specific staff member): CLINICAL    What was the call regarding: REQUESTING A C/B TO DISCUSS ACID REFLUX MEDICATION    Is it okay if the provider responds through MyChart: N/A

## 2023-08-31 NOTE — TELEPHONE ENCOUNTER
Patient is wanting to see if we can send in the Prevacid 30mg capsules since you had asked her to increase to taking 2 [facility]mg capsules [facility] because it would be cheaper for her. I let her know I would talk with Dr. Gutierrez and she v/u.

## 2023-08-31 NOTE — TELEPHONE ENCOUNTER
Caller: Halie Guidry    Relationship: Self    Best call back number: [phone]     What is the best time to reach you: ANY    Who are you requesting to speak with (clinical staff, provider,  specific staff member): CLINICAL STAFF    Do you know the name of the person who called:      What was the call regarding: PATIENT CALLED SHE STATED SHE RECEIVED THE LETTER STATING SHE WILL NEED APPOINTMENT TO GO OVER PATHOLOGY RESULTS WITH DR SILVA.  SHE HAS ALREADY GOTTEN THE PATHOLOGY RESULTS WITH DR AYALA IN HEMATOLOGY/ONCOLOGY DEPT.  DOES SHE STILL NEED THIS APPOINTMENT WITH DR SILVA    Is it okay if the provider responds through [employer]:          DELETE AFTER READING TO PATIENT: “ Thank you for sharing this information with me. I will send a message to the . Please allow up to 48 hours for the  to follow up on this request.”

## 2023-08-31 NOTE — TELEPHONE ENCOUNTER
Called patient to let her know Prevacid 3 mg capsules would be sent to [facility]. Patient v/u.

## 2023-09-01 ENCOUNTER — TELEPHONE (OUTPATIENT)
Dept: ONCOLOGY | Facility: CLINIC | Age: 83
End: 2023-09-01
Payer: MEDICARE

## 2023-09-01 DIAGNOSIS — I10 ACCELERATED HYPERTENSION: Chronic | ICD-10-CM

## 2023-09-01 DIAGNOSIS — M79.2 NEUROPATHIC PAIN: Chronic | ICD-10-CM

## 2023-09-01 DIAGNOSIS — F41.9 ANXIETY: Chronic | ICD-10-CM

## 2023-09-01 NOTE — TELEPHONE ENCOUNTER
----- Message from Keiry Jackson RN sent at 8/31/2023  3:58 PM EDT -----  Taylor,     Patient needs to be scheduled for the octreotide injection on the same day as her pet for after the pet scan per Dr. Uribe last note.     Thank you      Other RVP + r/e

## 2023-09-04 RX ORDER — DULOXETIN HYDROCHLORIDE 30 MG/1
CAPSULE, DELAYED RELEASE ORAL
Qty: 30 CAPSULE | Refills: 0 | Status: SHIPPED | OUTPATIENT
Start: 2023-09-04

## 2023-09-04 RX ORDER — LISINOPRIL 40 MG/1
TABLET ORAL
Qty: 30 TABLET | Refills: 0 | Status: SHIPPED | OUTPATIENT
Start: 2023-09-04

## 2023-09-11 ENCOUNTER — TELEPHONE (OUTPATIENT)
Dept: ONCOLOGY | Facility: CLINIC | Age: 83
End: 2023-09-11
Payer: MEDICARE

## 2023-09-11 NOTE — TELEPHONE ENCOUNTER
----- Message from Katlin Mendoza RN sent at 9/11/2023  8:33 AM EDT -----  Patient needs a lab appt added on Wed with her octreotide injection.  Thank you

## 2023-09-11 NOTE — TELEPHONE ENCOUNTER
Caller: LorraineHalie hernandez JANIE    Relationship: Self    Best call back number:  [phone]      Who are you requesting to speak with (clinical staff, provider,  specific staff member): CLINICAL     What was the call regarding: HALIE IS WANTING TO KNOW IF SHE CAN HAVE A SEDATIVE BEFORE HER INJECTION ON 9-13, AND SHE IS WANTING TO KNOW WHAT PART OF HER BODY SHE WILL BE GETTING THE INJECTION    PLEASE CALL AND ADVISE     Is it okay if the provider responds through MyChart: NO

## 2023-09-11 NOTE — TELEPHONE ENCOUNTER
Called the patient and she wanted to know how bad the octreotide injection was going to hurt and I let her know it would be a fast process and very similar to any vaccine she has ever received. Patient v/u.

## 2023-09-12 ENCOUNTER — TELEPHONE (OUTPATIENT)
Dept: FAMILY MEDICINE CLINIC | Facility: CLINIC | Age: 83
End: 2023-09-12

## 2023-09-12 NOTE — TELEPHONE ENCOUNTER
Caller: [facility]STO [facility]    Relationship: Home Health    Best call back number: [phone]     What orders are you requesting (i.e. lab or imaging): PHYSICAL THERAPY ONCE A WEEK FOR EIGHT WEEKS    Where will you receive your lab/imaging services: IN HOME    Additional notes: SADIA IS REQUESTING TO EXTEND THE PATIENTS ORDER FOR PHYSICAL THERAPY FOR ONCE A WEEK FOR EIGHT WEEKS.    PLEASE CALL TO GIVE VERBAL ORDERS.

## 2023-09-13 ENCOUNTER — INFUSION (OUTPATIENT)
Dept: ONCOLOGY | Facility: HOSPITAL | Age: 83
End: 2023-09-13
Payer: MEDICARE

## 2023-09-13 ENCOUNTER — LAB (OUTPATIENT)
Dept: LAB | Facility: HOSPITAL | Age: 83
End: 2023-09-13
Payer: MEDICARE

## 2023-09-13 ENCOUNTER — HOSPITAL ENCOUNTER (OUTPATIENT)
Dept: PET IMAGING | Facility: HOSPITAL | Age: 83
Discharge: HOME | End: 2023-09-13
Payer: MEDICARE

## 2023-09-13 DIAGNOSIS — C7A.8 PRIMARY MALIGNANT NEUROENDOCRINE TUMOR OF PANCREAS: ICD-10-CM

## 2023-09-13 DIAGNOSIS — Z79.899 ENCOUNTER FOR LONG-TERM (CURRENT) USE OF HIGH-RISK MEDICATION: Primary | ICD-10-CM

## 2023-09-13 DIAGNOSIS — D3A.8 PRIMARY PANCREATIC NEUROENDOCRINE TUMOR: ICD-10-CM

## 2023-09-13 DIAGNOSIS — Z79.899 ENCOUNTER FOR LONG-TERM (CURRENT) USE OF HIGH-RISK MEDICATION: ICD-10-CM

## 2023-09-13 LAB
BASOPHILS # BLD AUTO: 0.07 10*3/MM3 (ref 0–0.2)
BASOPHILS NFR BLD AUTO: 0.6 % (ref 0–1.5)
DEPRECATED RDW RBC AUTO: 49.3 FL (ref 37–54)
EOSINOPHIL # BLD AUTO: 0.26 10*3/MM3 (ref 0–0.4)
EOSINOPHIL NFR BLD AUTO: 2.3 % (ref 0.3–6.2)
ERYTHROCYTE [DISTWIDTH] IN BLOOD BY AUTOMATED COUNT: 14.9 % (ref 12.3–15.4)
HCT VFR BLD AUTO: 37.4 % (ref 34–46.6)
HGB BLD-MCNC: 11.6 G/DL (ref 12–15.9)
IMM GRANULOCYTES # BLD AUTO: 0.09 10*3/MM3 (ref 0–0.05)
IMM GRANULOCYTES NFR BLD AUTO: 0.8 % (ref 0–0.5)
LYMPHOCYTES # BLD AUTO: 1.25 10*3/MM3 (ref 0.7–3.1)
LYMPHOCYTES NFR BLD AUTO: 11.1 % (ref 19.6–45.3)
MCH RBC QN AUTO: 28 PG (ref 26.6–33)
MCHC RBC AUTO-ENTMCNC: 31 G/DL (ref 31.5–35.7)
MCV RBC AUTO: 90.1 FL (ref 79–97)
MONOCYTES # BLD AUTO: 0.72 10*3/MM3 (ref 0.1–0.9)
MONOCYTES NFR BLD AUTO: 6.4 % (ref 5–12)
NEUTROPHILS NFR BLD AUTO: 78.8 % (ref 42.7–76)
NEUTROPHILS NFR BLD AUTO: 8.85 10*3/MM3 (ref 1.7–7)
NRBC BLD AUTO-RTO: 0 /100 WBC (ref 0–0.2)
PLATELET # BLD AUTO: 333 10*3/MM3 (ref 140–450)
PMV BLD AUTO: 9.4 FL (ref 6–12)
RBC # BLD AUTO: 4.15 10*6/MM3 (ref 3.77–5.28)
T4 FREE SERPL-MCNC: 1.1 NG/DL (ref 0.93–1.7)
TSH SERPL DL<=0.05 MIU/L-ACNC: 5.25 UIU/ML (ref 0.27–4.2)
WBC NRBC COR # BLD: 11.24 10*3/MM3 (ref 3.4–10.8)

## 2023-09-13 PROCEDURE — A9592 COPPER CU 64 DOTATATE 1 MCI/ML SOLUTION: HCPCS | Performed by: INTERNAL MEDICINE

## 2023-09-13 PROCEDURE — 36415 COLL VENOUS BLD VENIPUNCTURE: CPT

## 2023-09-13 PROCEDURE — 85025 COMPLETE CBC W/AUTO DIFF WBC: CPT

## 2023-09-13 PROCEDURE — 0 COPPER CU 64 DOTATATE 1 MCI/ML SOLUTION: Performed by: INTERNAL MEDICINE

## 2023-09-13 PROCEDURE — 78815 PET IMAGE W/CT SKULL-THIGH: CPT

## 2023-09-13 PROCEDURE — 96372 THER/PROPH/DIAG INJ SC/IM: CPT

## 2023-09-13 PROCEDURE — 84443 ASSAY THYROID STIM HORMONE: CPT | Performed by: INTERNAL MEDICINE

## 2023-09-13 PROCEDURE — 25010000002 OCTREOTIDE PER 1 MG: Performed by: INTERNAL MEDICINE

## 2023-09-13 PROCEDURE — 84439 ASSAY OF FREE THYROXINE: CPT | Performed by: INTERNAL MEDICINE

## 2023-09-13 RX ADMIN — OCTREOTIDE ACETATE 20 MG: KIT at 15:51

## 2023-09-13 RX ADMIN — COPPER CU 64 DOTATATE 1 DOSE: 1 INJECTION, SOLUTION INTRAVENOUS at 14:25

## 2023-09-13 NOTE — TELEPHONE ENCOUNTER
Left message for pt to return call.  Ok for the hub to relay message. -  I think there is some confusion here, as I never contacted her regarding what she is discussing. I believe this came from her Hem/Onc office. Please clarify.

## 2023-09-23 ENCOUNTER — HOSPITAL ENCOUNTER (EMERGENCY)
Facility: HOSPITAL | Age: 83
Discharge: HOME | End: 2023-09-23
Attending: EMERGENCY MEDICINE
Payer: MEDICARE

## 2023-09-23 VITALS
TEMPERATURE: 98 F | HEIGHT: 68 IN | SYSTOLIC BLOOD PRESSURE: 154 MMHG | WEIGHT: 190 LBS | OXYGEN SATURATION: 97 % | DIASTOLIC BLOOD PRESSURE: 71 MMHG | BODY MASS INDEX: 28.79 KG/M2 | RESPIRATION RATE: 14 BRPM | HEART RATE: 55 BPM

## 2023-09-23 DIAGNOSIS — E11.649 TYPE 2 DIABETES MELLITUS WITH HYPOGLYCEMIA WITHOUT COMA, WITH LONG-TERM CURRENT USE OF INSULIN: ICD-10-CM

## 2023-09-23 DIAGNOSIS — E16.2 HYPOGLYCEMIA: Primary | ICD-10-CM

## 2023-09-23 DIAGNOSIS — Z79.4 TYPE 2 DIABETES MELLITUS WITH HYPOGLYCEMIA WITHOUT COMA, WITH LONG-TERM CURRENT USE OF INSULIN: ICD-10-CM

## 2023-09-23 LAB
ALBUMIN SERPL-MCNC: 3.8 G/DL (ref 3.5–5.2)
ALBUMIN/GLOB SERPL: 1.1 G/DL
ALP SERPL-CCNC: 165 U/L (ref 39–117)
ALT SERPL W P-5'-P-CCNC: 31 U/L (ref 1–33)
ANION GAP SERPL CALCULATED.3IONS-SCNC: 12 MMOL/L (ref 5–15)
AST SERPL-CCNC: 12 U/L (ref 1–32)
BASOPHILS # BLD AUTO: 0.09 10*3/MM3 (ref 0–0.2)
BASOPHILS NFR BLD AUTO: 0.7 % (ref 0–1.5)
BILIRUB SERPL-MCNC: 0.2 MG/DL (ref 0–1.2)
BUN SERPL-MCNC: 28 MG/DL (ref 8–23)
BUN/CREAT SERPL: 25.5 (ref 7–25)
CALCIUM SPEC-SCNC: 9.1 MG/DL (ref 8.6–10.5)
CHLORIDE SERPL-SCNC: 100 MMOL/L (ref 98–107)
CO2 SERPL-SCNC: 22 MMOL/L (ref 22–29)
CREAT SERPL-MCNC: 1.1 MG/DL (ref 0.57–1)
DEPRECATED RDW RBC AUTO: 45 FL (ref 37–54)
EGFRCR SERPLBLD CKD-EPI 2021: 50 ML/MIN/1.73
EOSINOPHIL # BLD AUTO: 0.12 10*3/MM3 (ref 0–0.4)
EOSINOPHIL NFR BLD AUTO: 0.9 % (ref 0.3–6.2)
ERYTHROCYTE [DISTWIDTH] IN BLOOD BY AUTOMATED COUNT: 14.2 % (ref 12.3–15.4)
GLOBULIN UR ELPH-MCNC: 3.4 GM/DL
GLUCOSE BLDC GLUCOMTR-MCNC: 177 MG/DL (ref 70–130)
GLUCOSE BLDC GLUCOMTR-MCNC: 199 MG/DL (ref 70–130)
GLUCOSE BLDC GLUCOMTR-MCNC: 218 MG/DL (ref 70–130)
GLUCOSE SERPL-MCNC: 239 MG/DL (ref 65–99)
HCT VFR BLD AUTO: 36.7 % (ref 34–46.6)
HGB BLD-MCNC: 11.8 G/DL (ref 12–15.9)
IMM GRANULOCYTES # BLD AUTO: 0.08 10*3/MM3 (ref 0–0.05)
IMM GRANULOCYTES NFR BLD AUTO: 0.6 % (ref 0–0.5)
LIPASE SERPL-CCNC: 24 U/L (ref 13–60)
LYMPHOCYTES # BLD AUTO: 0.75 10*3/MM3 (ref 0.7–3.1)
LYMPHOCYTES NFR BLD AUTO: 5.6 % (ref 19.6–45.3)
MCH RBC QN AUTO: 27.6 PG (ref 26.6–33)
MCHC RBC AUTO-ENTMCNC: 32.2 G/DL (ref 31.5–35.7)
MCV RBC AUTO: 85.9 FL (ref 79–97)
MONOCYTES # BLD AUTO: 0.63 10*3/MM3 (ref 0.1–0.9)
MONOCYTES NFR BLD AUTO: 4.7 % (ref 5–12)
NEUTROPHILS NFR BLD AUTO: 11.82 10*3/MM3 (ref 1.7–7)
NEUTROPHILS NFR BLD AUTO: 87.5 % (ref 42.7–76)
NRBC BLD AUTO-RTO: 0 /100 WBC (ref 0–0.2)
PLATELET # BLD AUTO: 311 10*3/MM3 (ref 140–450)
PMV BLD AUTO: 9.5 FL (ref 6–12)
POTASSIUM SERPL-SCNC: 4.3 MMOL/L (ref 3.5–5.2)
PROT SERPL-MCNC: 7.2 G/DL (ref 6–8.5)
RBC # BLD AUTO: 4.27 10*6/MM3 (ref 3.77–5.28)
SODIUM SERPL-SCNC: 134 MMOL/L (ref 136–145)
WBC NRBC COR # BLD: 13.49 10*3/MM3 (ref 3.4–10.8)

## 2023-09-23 PROCEDURE — 80053 COMPREHEN METABOLIC PANEL: CPT | Performed by: EMERGENCY MEDICINE

## 2023-09-23 PROCEDURE — 82948 REAGENT STRIP/BLOOD GLUCOSE: CPT

## 2023-09-23 PROCEDURE — 85025 COMPLETE CBC W/AUTO DIFF WBC: CPT | Performed by: EMERGENCY MEDICINE

## 2023-09-23 PROCEDURE — 99283 EMERGENCY DEPT VISIT LOW MDM: CPT

## 2023-09-23 PROCEDURE — 83690 ASSAY OF LIPASE: CPT | Performed by: EMERGENCY MEDICINE

## 2023-09-23 RX ORDER — SODIUM CHLORIDE 0.9 % (FLUSH) 0.9 %
10 SYRINGE (ML) INJECTION AS NEEDED
Status: DISCONTINUED | OUTPATIENT
Start: 2023-09-23 | End: 2023-09-24 | Stop reason: HOSPADM

## 2023-09-24 NOTE — ED PROVIDER NOTES
EMERGENCY DEPARTMENT ENCOUNTER    Room Number:  19/19  PCP: Napoleon Mead MD  Historian: Patient      HPI:  Chief Complaint: Low blood sugar  A complete HPI/ROS/PMH/PSH/SH/FH are unobtainable due to: Nothing  Context: Halie Guidry is a 83 y.o. female who presents to the ED c/o low blood sugar.  Her grandson was with her at her house when he noted that she was confused and sweaty.  They try to get her to eat some lasagna and some jelly but it was not working.  They called 911 and upon EMS arrival her blood glucose was low.  They started an IV and administered D10 W with improvement of blood glucose to 177 and improvement of mental status.  Patient is on insulin 2 and sometimes 3 times a day.  She does not believe that she took it this evening but had had this afternoon.  She recalls eating some Cheerios earlier today but admits that she has not had much to eat today.  She denies feeling ill.  She has chronic diarrhea but that is not new recently.  She denies nausea or vomiting.  She denies fever or chills.  She had noticed some mild leg swelling recently but has improved over the last few days.    Currently she reports feeling better since her blood glucose has normalized.        PAST MEDICAL HISTORY  Active Ambulatory Problems     Diagnosis Date Noted   • Compression fracture 04/22/2009   • Lumbar degenerative disc disease 07/05/2012   • Type 2 diabetes mellitus with hyperglycemia, with long-term current use of insulin    • Hyperlipidemia    • Benign essential hypertension 08/20/2014   • Primary hypothyroidism    • Leukocytosis    • Neuropathy    • Osteoporosis 09/09/2015   • Proteinuria 02/28/2012   • Tobacco abuse 08/22/2013   • Diabetic eye exam 02/12/2014   • Generalized weakness 05/27/2017   • Spinal stenosis 05/27/2017   • Scoliosis 05/27/2017   • Arthritis 05/27/2017   • VBI (vertebrobasilar insufficiency) 05/28/2017   • Orthostatic hypotension 05/28/2017   • Autonomic neuropathy due to secondary  diabetes mellitus 05/28/2017   • Severe hypothyroidism 05/30/2017   • Noncompliance with medication regimen 05/30/2017   • Vitamin D deficiency disease 06/01/2017   • Altered mental status 12/15/2017   • Tremor 01/09/2018   • Seizure 05/21/2019   • Stage 3a chronic kidney disease 03/09/2012   • Thoracic degenerative disc disease 01/27/2020   • Metabolic encephalopathy 12/02/2021   • VIPUL (acute kidney injury) 12/02/2021   • Hyperglycemia 12/02/2021   • Type II diabetes mellitus, uncontrolled 12/02/2021   • Lower abdominal pain 02/23/2022   • Transaminitis 02/23/2022   • COVID-19 virus detected 02/23/2022   • UTI (urinary tract infection) 02/23/2022   • 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   • Choledocholithiasis 02/23/2022   • Hypokalemia 02/24/2022   • Hypoxia 02/24/2022   • Generalized abdominal pain 03/26/2022   • History of Clostridium difficile infection 03/26/2022   • History of ERCP 03/26/2022   • Acute UTI (urinary tract infection) 03/27/2022   • Hypomagnesemia 03/28/2022   • Burning pain 05/27/2022   • Anxiety disorder 05/27/2022   • Neuropathic pain 05/27/2022   • Intertrigo 05/27/2022   • Weakness of both lower extremities 06/24/2022   • Accelerated hypertension 09/01/2022   • Acute cystitis without hematuria 09/06/2022   • Altered mental status, unspecified altered mental status type 11/04/2022   • Left lower lobe pneumonia 11/04/2022   • Acute pain of left foot 05/13/2023   • Cellulitis and abscess of left lower extremity 05/15/2023   • Hypertensive kidney disease with stage 3a chronic kidney disease 06/02/2023   • Bilateral leg pain 07/21/2023   • Peripheral edema 07/30/2023   • Primary malignant neuroendocrine tumor of pancreas 08/24/2023   • Encounter for long-term (current) use of high-risk medication 08/28/2023     Resolved Ambulatory Problems     Diagnosis Date Noted   • Acute metabolic encephalopathy 06/22/2022   • Hypoglycemia 06/23/2022   • Acute metabolic encephalopathy 06/24/2022   • Diarrhea  11/04/2022     Past Medical History:   Diagnosis Date   • Anxiety    • Bleeding disorder    • Depression    • Diabetes    • Diabetes mellitus, type 2    • Disc degeneration, lumbar    • Headache, tension-type    • Hypothyroidism    • Peripheral neuropathy    • Rotator cuff tear, left    • Shoulder pain          PAST SURGICAL HISTORY  Past Surgical History:   Procedure Laterality Date   • APPENDECTOMY     • BILATERAL BREAST REDUCTION Bilateral 08/2015   • CATARACT EXTRACTION  03/2015   • CHOLECYSTECTOMY WITH INTRAOPERATIVE CHOLANGIOGRAM N/A 3/27/2022    Procedure: CHOLECYSTECTOMY LAPAROSCOPIC INTRAOPERATIVE CHOLANGIOGRAM;  Surgeon: Aiyana Hill MD;  Location: [facility] MAIN OR;  Service: General;  Laterality: N/A;   • COLONOSCOPY  06/05/2015    WNL   • ERCP N/A 2/25/2022    Procedure: ENDOSCOPIC RETROGRADE CHOLANGIOPANCREATOGRAPHY with sphincterotomy and balloon sweep;  Surgeon: Chilo Wilhelm MD;  Location: [facility] ENDOSCOPY;  Service: Gastroenterology;  Laterality: N/A;  PRE/POST - CBD stones   • ERCP N/A 3/28/2022    Procedure: ENDOSCOPIC RETROGRADE CHOLANGIOPANCREATOGRAPHY WITH SPHINCTEROTOMY AND BALLOON SWEEP;  Surgeon: Chilo Wilhelm MD;  Location: [facility] ENDOSCOPY;  Service: Gastroenterology;  Laterality: N/A;  PRE: COMMON DUCT STONE  POST: COMMON DUCT STONE   • KYPHOPLASTY     • REDUCTION MAMMAPLASTY     • TONSILLECTOMY           FAMILY HISTORY  Family History   Problem Relation Age of Onset   • Bone cancer Mother    • No Known Problems Father    • Heart disease Daughter          SOCIAL HISTORY  Social History     Socioeconomic History   • Marital status:    • Number of children: 3   Tobacco Use   • Smoking status: Former     Packs/day: 1.00     Years: 40.00     Pack years: 40.00     Types: Cigarettes     Quit date: 2013     Years since quitting: 10.7   • Smokeless tobacco: Never   Vaping Use   • Vaping Use: Never used   Substance and Sexual Activity   • Alcohol use: No   • Drug use: No    • Sexual activity: Defer         ALLERGIES  Sulfa antibiotics        REVIEW OF SYSTEMS  Review of Systems   Review of all 14 systems is negative other than stated in the HPI above.      PHYSICAL EXAM  ED Triage Vitals [09/23/23 2126]   Temp Heart Rate Resp BP SpO2   98 °F (36.7 °C) 62 14 164/69 99 %      Temp src Heart Rate Source Patient Position BP Location FiO2 (%)   -- -- -- -- --       Physical Exam      GENERAL: Awake and alert, no acute distress  HENT: nares patent  EYES: no scleral icterus, pupils 3 mm reactive bilaterally, EOMI  CV: regular rhythm, normal rate  RESPIRATORY: normal effort  ABDOMEN: soft, nondistended, nontender throughout  MUSCULOSKELETAL: no deformity  NEURO: alert, moves all extremities, follows commands, cranial nerves II through XII grossly intact, speech fluent and clear  PSYCH:  calm, cooperative  SKIN: warm, dry    Vital signs and nursing notes reviewed.          LAB RESULTS  Recent Results (from the past 24 hour(s))   POC Glucose Once    Collection Time: 09/23/23  9:38 PM    Specimen: Blood   Result Value Ref Range    Glucose 177 (H) 70 - 130 mg/dL   Comprehensive Metabolic Panel    Collection Time: 09/23/23 10:02 PM    Specimen: Blood   Result Value Ref Range    Glucose 239 (H) 65 - 99 mg/dL    BUN 28 (H) 8 - 23 mg/dL    Creatinine 1.10 (H) 0.57 - 1.00 mg/dL    Sodium 134 (L) 136 - 145 mmol/L    Potassium 4.3 3.5 - 5.2 mmol/L    Chloride 100 98 - 107 mmol/L    CO2 22.0 22.0 - 29.0 mmol/L    Calcium 9.1 8.6 - 10.5 mg/dL    Total Protein 7.2 6.0 - 8.5 g/dL    Albumin 3.8 3.5 - 5.2 g/dL    ALT (SGPT) 31 1 - 33 U/L    AST (SGOT) 12 1 - 32 U/L    Alkaline Phosphatase 165 (H) 39 - 117 U/L    Total Bilirubin 0.2 0.0 - 1.2 mg/dL    Globulin 3.4 gm/dL    A/G Ratio 1.1 g/dL    BUN/Creatinine Ratio 25.5 (H) 7.0 - 25.0    Anion Gap 12.0 5.0 - 15.0 mmol/L    eGFR 50.0 (L) >60.0 mL/min/1.73   Lipase    Collection Time: 09/23/23 10:02 PM    Specimen: Blood   Result Value Ref Range    Lipase 24  13 - 60 U/L   CBC Auto Differential    Collection Time: 09/23/23 10:02 PM    Specimen: Blood   Result Value Ref Range    WBC 13.49 (H) 3.40 - 10.80 10*3/mm3    RBC 4.27 3.77 - 5.28 10*6/mm3    Hemoglobin 11.8 (L) 12.0 - 15.9 g/dL    Hematocrit 36.7 34.0 - 46.6 %    MCV 85.9 79.0 - 97.0 fL    MCH 27.6 26.6 - 33.0 pg    MCHC 32.2 31.5 - 35.7 g/dL    RDW 14.2 12.3 - 15.4 %    RDW-SD 45.0 37.0 - 54.0 fl    MPV 9.5 6.0 - 12.0 fL    Platelets 311 140 - 450 10*3/mm3    Neutrophil % 87.5 (H) 42.7 - 76.0 %    Lymphocyte % 5.6 (L) 19.6 - 45.3 %    Monocyte % 4.7 (L) 5.0 - 12.0 %    Eosinophil % 0.9 0.3 - 6.2 %    Basophil % 0.7 0.0 - 1.5 %    Immature Grans % 0.6 (H) 0.0 - 0.5 %    Neutrophils, Absolute 11.82 (H) 1.70 - 7.00 10*3/mm3    Lymphocytes, Absolute 0.75 0.70 - 3.10 10*3/mm3    Monocytes, Absolute 0.63 0.10 - 0.90 10*3/mm3    Eosinophils, Absolute 0.12 0.00 - 0.40 10*3/mm3    Basophils, Absolute 0.09 0.00 - 0.20 10*3/mm3    Immature Grans, Absolute 0.08 (H) 0.00 - 0.05 10*3/mm3    nRBC 0.0 0.0 - 0.2 /100 WBC   POC Glucose Once    Collection Time: 09/23/23 10:09 PM    Specimen: Blood   Result Value Ref Range    Glucose 199 (H) 70 - 130 mg/dL       Ordered the above labs and reviewed the results.        RADIOLOGY  No Radiology Exams Resulted Within Past 24 Hours    Ordered the above noted radiological studies. Reviewed by me in PACS.            PROCEDURES  Procedures            MEDICATIONS GIVEN IN ER  Medications   sodium chloride 0.9 % flush 10 mL (has no administration in time range)                   MEDICAL DECISION MAKING, PROGRESS, and CONSULTS    All labs have been independently reviewed by me.  All radiology studies have been reviewed by me and I have also reviewed the radiology report.   EKG's independently viewed and interpreted by me.  Discussion below represents my analysis of pertinent findings related to patient's condition, differential diagnosis, treatment plan and final disposition.      Additional  sources:  - Discussed/ obtained information from independent historians: Patient's grandson at bedside    - External (non-ED) record review: See ED course below for summary of recent endocrinology visit    - Chronic or social conditions impacting care: N/A    - Shared decision making: N/A      Orders placed during this visit:  Orders Placed This Encounter   Procedures   • Comprehensive Metabolic Panel   • Lipase   • CBC Auto Differential   • POC Glucose Once   • POC Glucose Once   • Insert Peripheral IV   • CBC & Differential           Differential diagnosis includes but is not limited to:    Acute renal failure  Dietary discretion  Liver failure  Adverse effects of medication        Independent interpretation of labs, radiology studies, and discussions with consultants:  ED Course as of 09/23/23 2304   Sat Sep 23, 2023   2210 Current blood glucose 199.  Patient was given juice to drink. [JR]   2244 Creatinine(!): 1.10 [JR]   2244 WBC(!): 13.49 [JR]   2253 Glucose(!): 239 [JR]   2253 Patient reassessed and informed of plan for discharge home.  She was instructed to monitor her blood glucose closely for the next 2 days, continue her usual home insulin regimen. [JR]   2303 I reviewed endocrinology progress note from 8/24/2023.  Patient has been a diabetic for more than 10 years.  She had been on Toujeo 80 units daily, Admelog 14 units +2 units per 50/150 before meals and Trulicity 1.5 mg weekly. [JR]      ED Course User Index  [JR] Bryant Fink MD           DIAGNOSIS  Final diagnoses:   Hypoglycemia   Type 2 diabetes mellitus with hypoglycemia without coma, with long-term current use of insulin         DISPOSITION  DISCHARGE    Patient discharged in stable condition.    Reviewed implications of results, diagnosis, meds, responsibility to follow up, warning signs and symptoms of possible worsening, potential complications and reasons to return to ER.    Patient/Family voiced understanding of above  instructions.    Discussed plan for discharge, as there is no emergent indication for admission. Patient referred to primary care provider for BP management due to today's BP. Pt/family is agreeable and understands need for follow up and repeat testing.  Pt is aware that discharge does not mean that nothing is wrong but it indicates no emergency is present that requires admission and they must continue care with follow-up as given below or physician of their choice.     FOLLOW-UP  Napoleon Mead MD  [address] [id number]  [phone]    Schedule an appointment as soon as possible for a visit            Medication List      No changes were made to your prescriptions during this visit.                   Latest Documented Vital Signs:  As of 23:04 EDT  BP- 164/69 HR- 62 Temp- 98 °F (36.7 °C) O2 sat- 99%              --    Please note that portions of this were completed with a voice recognition program.       Note Disclaimer: At [facility], we believe that sharing information builds trust and better relationships. You are receiving this note because you are receiving care at [facility] or recently visited. It is possible you will see health information before a provider has talked with you about it. This kind of information can be easy to misunderstand. To help you fully understand what it means for your health, we urge you to discuss this note with your provider.             Bryant Fink MD  09/23/23 3320

## 2023-09-24 NOTE — ED TRIAGE NOTES
To ER via EMS from home.  Call for low blood sugar.  Pt was verbally responsive, slightly confused on EMS arrival.  BS was initally 43.  D10 250 cc given per EMS .  BS increased to 200.  Pt did not eat dinner.

## 2023-09-28 DIAGNOSIS — I10 ACCELERATED HYPERTENSION: Chronic | ICD-10-CM

## 2023-09-28 RX ORDER — CHLORTHALIDONE 25 MG/1
TABLET ORAL
Qty: 30 TABLET | Refills: 0 | Status: SHIPPED | OUTPATIENT
Start: 2023-09-28

## 2023-10-09 ENCOUNTER — TELEPHONE (OUTPATIENT)
Dept: ONCOLOGY | Facility: CLINIC | Age: 83
End: 2023-10-09
Payer: MEDICARE

## 2023-10-09 NOTE — TELEPHONE ENCOUNTER
Caller: Halie Guidry    Relationship to patient: Self    Best call back number: [phone]    Chief complaint: R/S    Type of visit: LAB AND F/U    Requested date: NEXT AVLIABLE LATE AFTERNOON    If rescheduling, when is the original appointment: 9-28    Additional notes:ALSO NEEDS TO SCHEDULE HER MONTHLY INFUSION   PT HAD HER FIRST INFUSION ON 9-13    PLEASE CALL TO ADVISE OF SCHEDULING, MAY LEAVE VM

## 2023-10-10 ENCOUNTER — TELEPHONE (OUTPATIENT)
Dept: ENDOCRINOLOGY | Age: 83
End: 2023-10-10

## 2023-10-10 DIAGNOSIS — G62.9 NEUROPATHY: ICD-10-CM

## 2023-10-10 RX ORDER — PREGABALIN 50 MG/1
50 CAPSULE ORAL 3 TIMES DAILY
Qty: 9 CAPSULE | Refills: 0 | OUTPATIENT
Start: 2023-10-10 | End: 2023-10-13

## 2023-10-10 NOTE — TELEPHONE ENCOUNTER
Caller: BreaHalie    Relationship: Self    Best call back number: [phone]    Requested Prescriptions:   Requested Prescriptions     Pending Prescriptions Disp Refills    pregabalin (LYRICA) 50 MG capsule 9 capsule 0     Sig: Take 1 capsule by mouth 3 (Three) Times a Day for 3 days.        Pharmacy where request should be sent: [facility] [address] [phone] [facility] [phone]      Last office visit with prescribing clinician: 5/25/2023   Last telemedicine visit with prescribing clinician: Visit date not found   Next office visit with prescribing clinician: Visit date not found     Additional details provided by patient: ABOUT OUT    Does the patient have less than a 3 day supply:  [x] Yes  [] No    Would you like a call back once the refill request has been completed: [] Yes [x] No    If the office needs to give you a call back, can they leave a voicemail: [] Yes [x] No    Jaylen Hay Rep   10/10/23 15:53 EDT

## 2023-10-10 NOTE — TELEPHONE ENCOUNTER
PT CALLED FOR A REFILL REQUEST OF THE BELOW MEDS, SHE IS OUT OF MEDS AT THIS TIME.    insulin lispro (HUMALOG/ADMELOG) 100 UNIT/ML injection [[id number]]           GOING TO:    [facility] - [address] [phone] [facility] [phone] FX

## 2023-10-11 DIAGNOSIS — Z79.4 TYPE 2 DIABETES MELLITUS WITH HYPERGLYCEMIA, WITH LONG-TERM CURRENT USE OF INSULIN: ICD-10-CM

## 2023-10-11 DIAGNOSIS — G62.9 NEUROPATHY: ICD-10-CM

## 2023-10-11 DIAGNOSIS — E11.65 TYPE 2 DIABETES MELLITUS WITH HYPERGLYCEMIA, WITH LONG-TERM CURRENT USE OF INSULIN: ICD-10-CM

## 2023-10-11 RX ORDER — INSULIN LISPRO 100 [IU]/ML
INJECTION, SOLUTION INTRAVENOUS; SUBCUTANEOUS
Qty: 81 ML | Refills: 1
Start: 2023-10-11

## 2023-10-11 RX ORDER — PREGABALIN 50 MG/1
50 CAPSULE ORAL 3 TIMES DAILY
Qty: 9 CAPSULE | Refills: 0 | OUTPATIENT
Start: 2023-10-11 | End: 2023-10-14

## 2023-10-11 NOTE — TELEPHONE ENCOUNTER
Rx Refill Note  Requested Prescriptions     Pending Prescriptions Disp Refills    insulin lispro (HUMALOG/ADMELOG) 100 UNIT/ML injection       Si units plus 2 units per 50 over 150 before meals      Last office visit with prescribing clinician: 2023   Last telemedicine visit with prescribing clinician: Visit date not found   Next office visit with prescribing clinician: 10/24/2023                         Would you like a call back once the refill request has been completed: [] Yes [] No    If the office needs to give you a call back, can they leave a voicemail: [] Yes [] No    Grecia Jenkins  10/11/23, 07:58 EDT

## 2023-10-11 NOTE — TELEPHONE ENCOUNTER
Caller: BreaHalie    Relationship: Self    Best call back number: [phone]     Requested Prescriptions:   Requested Prescriptions     Pending Prescriptions Disp Refills    pregabalin (LYRICA) 50 MG capsule 9 capsule 0     Sig: Take 1 capsule by mouth 3 (Three) Times a Day for 3 days.        Pharmacy where request should be sent: [facility] [address] [phone] [facility] [phone]      Last office visit with prescribing clinician: 5/25/2023   Last telemedicine visit with prescribing clinician: Visit date not found   Next office visit with prescribing clinician: Visit date not found     Additional details provided by patient: PATIENT STATES THIS PRESCRIPTION WAS PRESCRIBED BY DR. SILVA AND IS NEEDING A REFILL. PATIENT STATES DRJanet SILVA'S NAME IS PRINTED ON THE BOTTLE AND IS REQUESTING 270 CAPSULES SENT TO [facility].     PATIENT STATES SHE HAS ABOUT 5 DAYS LEFT.     Does the patient have less than a 3 day supply:  [] Yes  [x] No    Would you like a call back once the refill request has been completed: [] Yes [x] No    If the office needs to give you a call back, can they leave a voicemail: [] Yes [x] No    Jaylen Contreras Rep   10/11/23 13:07 EDT

## 2023-10-16 ENCOUNTER — OFFICE VISIT (OUTPATIENT)
Dept: ONCOLOGY | Facility: CLINIC | Age: 83
End: 2023-10-16
Payer: MEDICARE

## 2023-10-16 ENCOUNTER — INFUSION (OUTPATIENT)
Dept: ONCOLOGY | Facility: HOSPITAL | Age: 83
End: 2023-10-16
Payer: MEDICARE

## 2023-10-16 ENCOUNTER — LAB (OUTPATIENT)
Dept: LAB | Facility: HOSPITAL | Age: 83
End: 2023-10-16
Payer: MEDICARE

## 2023-10-16 ENCOUNTER — TELEPHONE (OUTPATIENT)
Dept: CASE MANAGEMENT | Facility: OTHER | Age: 83
End: 2023-10-16
Payer: MEDICARE

## 2023-10-16 VITALS
SYSTOLIC BLOOD PRESSURE: 189 MMHG | BODY MASS INDEX: 31.25 KG/M2 | WEIGHT: 206.2 LBS | HEART RATE: 60 BPM | HEIGHT: 68 IN | DIASTOLIC BLOOD PRESSURE: 72 MMHG | OXYGEN SATURATION: 96 % | TEMPERATURE: 97.8 F

## 2023-10-16 DIAGNOSIS — C7A.8 PRIMARY MALIGNANT NEUROENDOCRINE TUMOR OF PANCREAS: ICD-10-CM

## 2023-10-16 DIAGNOSIS — Z79.899 ENCOUNTER FOR LONG-TERM (CURRENT) USE OF HIGH-RISK MEDICATION: ICD-10-CM

## 2023-10-16 DIAGNOSIS — D3A.8 PRIMARY PANCREATIC NEUROENDOCRINE TUMOR: ICD-10-CM

## 2023-10-16 DIAGNOSIS — C7A.8 PRIMARY MALIGNANT NEUROENDOCRINE TUMOR OF PANCREAS: Primary | ICD-10-CM

## 2023-10-16 DIAGNOSIS — Z79.899 ENCOUNTER FOR LONG-TERM (CURRENT) USE OF HIGH-RISK MEDICATION: Primary | ICD-10-CM

## 2023-10-16 LAB
ALBUMIN SERPL-MCNC: 3.9 G/DL (ref 3.5–5.2)
ALBUMIN/GLOB SERPL: 1.1 G/DL
ALP SERPL-CCNC: 127 U/L (ref 39–117)
ALT SERPL W P-5'-P-CCNC: 7 U/L (ref 1–33)
ANION GAP SERPL CALCULATED.3IONS-SCNC: 14.7 MMOL/L (ref 5–15)
AST SERPL-CCNC: 19 U/L (ref 1–32)
BASOPHILS # BLD AUTO: 0.1 10*3/MM3 (ref 0–0.2)
BASOPHILS NFR BLD AUTO: 0.8 % (ref 0–1.5)
BILIRUB SERPL-MCNC: 0.4 MG/DL (ref 0–1.2)
BUN SERPL-MCNC: 23 MG/DL (ref 8–23)
BUN/CREAT SERPL: 22.1 (ref 7–25)
CALCIUM SPEC-SCNC: 8.7 MG/DL (ref 8.6–10.5)
CHLORIDE SERPL-SCNC: 97 MMOL/L (ref 98–107)
CO2 SERPL-SCNC: 25.3 MMOL/L (ref 22–29)
CREAT SERPL-MCNC: 1.04 MG/DL (ref 0.6–1.1)
DEPRECATED RDW RBC AUTO: 50 FL (ref 37–54)
EGFRCR SERPLBLD CKD-EPI 2021: 53.4 ML/MIN/1.73
EOSINOPHIL # BLD AUTO: 0.35 10*3/MM3 (ref 0–0.4)
EOSINOPHIL NFR BLD AUTO: 2.9 % (ref 0.3–6.2)
ERYTHROCYTE [DISTWIDTH] IN BLOOD BY AUTOMATED COUNT: 15 % (ref 12.3–15.4)
GLOBULIN UR ELPH-MCNC: 3.5 GM/DL
GLUCOSE SERPL-MCNC: 166 MG/DL (ref 65–99)
HCT VFR BLD AUTO: 40.1 % (ref 34–46.6)
HGB BLD-MCNC: 12.6 G/DL (ref 12–15.9)
IMM GRANULOCYTES # BLD AUTO: 0.08 10*3/MM3 (ref 0–0.05)
IMM GRANULOCYTES NFR BLD AUTO: 0.7 % (ref 0–0.5)
LYMPHOCYTES # BLD AUTO: 1.74 10*3/MM3 (ref 0.7–3.1)
LYMPHOCYTES NFR BLD AUTO: 14.5 % (ref 19.6–45.3)
MAGNESIUM SERPL-MCNC: 1.7 MG/DL (ref 1.6–2.4)
MCH RBC QN AUTO: 28.1 PG (ref 26.6–33)
MCHC RBC AUTO-ENTMCNC: 31.4 G/DL (ref 31.5–35.7)
MCV RBC AUTO: 89.5 FL (ref 79–97)
MONOCYTES # BLD AUTO: 0.74 10*3/MM3 (ref 0.1–0.9)
MONOCYTES NFR BLD AUTO: 6.2 % (ref 5–12)
NEUTROPHILS NFR BLD AUTO: 74.9 % (ref 42.7–76)
NEUTROPHILS NFR BLD AUTO: 8.99 10*3/MM3 (ref 1.7–7)
NRBC BLD AUTO-RTO: 0 /100 WBC (ref 0–0.2)
PLATELET # BLD AUTO: 333 10*3/MM3 (ref 140–450)
PMV BLD AUTO: 9.2 FL (ref 6–12)
POTASSIUM SERPL-SCNC: 4.4 MMOL/L (ref 3.5–5.2)
PROT SERPL-MCNC: 7.4 G/DL (ref 6–8.5)
RBC # BLD AUTO: 4.48 10*6/MM3 (ref 3.77–5.28)
SODIUM SERPL-SCNC: 137 MMOL/L (ref 136–145)
WBC NRBC COR # BLD: 12 10*3/MM3 (ref 3.4–10.8)

## 2023-10-16 PROCEDURE — 25010000002 OCTREOTIDE PER 1 MG: Performed by: INTERNAL MEDICINE

## 2023-10-16 PROCEDURE — 85025 COMPLETE CBC W/AUTO DIFF WBC: CPT

## 2023-10-16 PROCEDURE — 83735 ASSAY OF MAGNESIUM: CPT

## 2023-10-16 PROCEDURE — 96372 THER/PROPH/DIAG INJ SC/IM: CPT

## 2023-10-16 PROCEDURE — 36415 COLL VENOUS BLD VENIPUNCTURE: CPT

## 2023-10-16 PROCEDURE — 96401 CHEMO ANTI-NEOPL SQ/IM: CPT

## 2023-10-16 PROCEDURE — 80053 COMPREHEN METABOLIC PANEL: CPT

## 2023-10-16 RX ADMIN — OCTREOTIDE ACETATE 20 MG: KIT at 16:40

## 2023-10-16 NOTE — PROGRESS NOTES
.REASON FOR FOLLOW-UP:   pancreatic neuroendocrine tumor    History of Present Illness    This is a pleasant 83-year-old woman with type 2 diabetes, hypothyroidism, hypertension, stage IIIb CKD, anxiety disorder initially admitted on 7/21/2023 for generalized weakness and difficulty ambulating about her house.  She had a chest x-ray performed on 7/21/2023 which showed possible soft tissue in the right hilum and a follow-up CT chest was performed 7/24/2023 showing numerous mediastinal and right hilar lymph nodes predominantly calcified favored to represent prior granulomatous disease.  Visualized upper abdomen showed pneumobilia, calcified splenic granulomas, bilateral renal cortical lesions, intermediate density lesion upper pole of the right kidney 2 cm, small hyperdense lesion midpole of the kidney for which a CT pre and postcontrast renal protocol was recommended.  A CT abdomen/pelvis with and without contrast was performed on 7/26/23 which showed a slightly hyperdense slightly lobular partially exophytic nodule upper pole the right kidney with no postcontrast enhancement and a subcentimeter hyperdense nodule immediately stable in size.  There were no enhancing renal lesions.  Urinary bladder was thickened but nearly completely collapsed.  In the pancreatic head there was mild enhancement of a suspected mass 3.6 cm in diameter narrowing the common bile duct and a 2.8 x 2.2 cm lymph node or exophytic component inferiorly with mild dilation of the pancreatic duct.  The mass was suspicious for a neuroendocrine tumor radiographically.     The patient reports chronic diarrhea over the previous year or so but no weight loss or abdominal pain.  Blood sugars have been running elevated.  She denies facial flushing or hypoglycemic events.  Diarrhea has been worse over the past 2 to 3 weeks with multiple episodes of stool incontinence.    Laboratory studies showed a glucagon of 143, gastrin 236, somatostatin 21, CEA 3.09,  CA 19-9 44.3, chromogranin A 2296, proinsulin 6.3, insulin 7.3, pancreatic polypeptide 643.6.    After discharge the patient underwent an EGD on 8/4/2023 showing small hiatal hernia, erythema with erosions in the gastric antrum and body suggestive of erosive gastritis.  An endoscopic ultrasound was performed showing a pancreatic mass 3.5 x 3.0 cm in the pancreatic head and a second mass more circumscribed adjacently possibly communicating.  Fine-needle aspiration of the mass was performed showing a well-differentiated pancreatic neuroendocrine tumor    Interval History:  Patient returns today 10/16/2023 for lab review due for Sandostatin.  She reports that she had actually been doing fairly well with diarrhea until about a week ago when she had significant issues with diarrhea.  That has now stopped.  She states that she has not had much as far as bowel movement since then.  She denies a complaint of pain.  No nausea or vomiting.  She has a good appetite.  She has chronic swelling in her legs, however she feels like that is actually improved.      Past Medical History, Past Surgical History, Social History, Family History have been reviewed and are without significant changes except as mentioned.    Review of Systems   Constitutional:  Positive for activity change and fatigue. Negative for unexpected weight change.   HENT: Negative.     Respiratory: Negative.     Cardiovascular: Negative.    Gastrointestinal:  Positive for diarrhea.   Genitourinary: Negative.    Musculoskeletal:  Positive for gait problem. Negative for myalgias.   Neurological:  Positive for weakness. Negative for light-headedness.   Hematological: Negative.    Psychiatric/Behavioral:  Positive for dysphoric mood. Negative for confusion.           Medications:  The current medication list was reviewed in the EMR    ALLERGIES:    Allergies   Allergen Reactions    Sulfa Antibiotics Unknown - High Severity     Pt says it was a long time ago and I don't  "remember but it wasn't good.        Objective      Vitals:    10/16/23 1602   BP: (!) 189/72   Pulse: 60   Temp: 97.8 °F (36.6 °C)   TempSrc: Temporal   SpO2: 96%   Weight: 93.5 kg (206 lb 3.2 oz)   Height: 172.7 cm (67.99\")   PainSc: 0-No pain         10/16/2023     4:01 PM   Current Status   ECOG score 1       Physical Exam  Vitals reviewed.   Constitutional:       General: She is not in acute distress.     Appearance: Normal appearance. She is well-developed.      Comments: Ambulating with a walker   HENT:      Head: Normocephalic and atraumatic.   Eyes:      Pupils: Pupils are equal, round, and reactive to light.   Cardiovascular:      Rate and Rhythm: Normal rate and regular rhythm.      Heart sounds: Normal heart sounds. No murmur heard.  Pulmonary:      Effort: Pulmonary effort is normal. No respiratory distress.      Breath sounds: Normal breath sounds. No wheezing, rhonchi or rales.   Abdominal:      General: Bowel sounds are normal. There is no distension.      Palpations: Abdomen is soft.   Musculoskeletal:         General: Swelling (1+ bilateral LE) present. Normal range of motion.      Cervical back: Normal range of motion.   Skin:     General: Skin is warm and dry.      Findings: No rash.   Neurological:      Mental Status: She is alert and oriented to person, place, and time.        RECENT LABS:  Hematology WBC   Date Value Ref Range Status   10/16/2023 12.00 (H) 3.40 - 10.80 10*3/mm3 Final   01/18/2021 9.4 3.4 - 10.8 x10E3/uL Final     RBC   Date Value Ref Range Status   10/16/2023 4.48 3.77 - 5.28 10*6/mm3 Final   01/18/2021 4.59 3.77 - 5.28 x10E6/uL Final     Hemoglobin   Date Value Ref Range Status   10/16/2023 12.6 12.0 - 15.9 g/dL Final     Hematocrit   Date Value Ref Range Status   10/16/2023 40.1 34.0 - 46.6 % Final     Platelets   Date Value Ref Range Status   10/16/2023 333 140 - 450 10*3/mm3 Final     Lab Results   Component Value Date    GLUCOSE 166 (H) 10/16/2023    BUN 23 10/16/2023    " CREATININE 1.04 10/16/2023    EGFRRESULT 42.3 (L) 06/02/2023    EGFR 53.4 (L) 10/16/2023    BCR 22.1 10/16/2023    K 4.4 10/16/2023    CO2 25.3 10/16/2023    CALCIUM 8.7 10/16/2023    PROTENTOTREF 7.2 06/02/2023    ALBUMIN 3.9 10/16/2023    BILITOT 0.4 10/16/2023    AST 19 10/16/2023    ALT 7 10/16/2023          CT ABD/PELVIS 7/26/23:  IMPRESSION:  1. There is a hyperenhancing mass at the pancreatic head measuring  approximately 3.6 cm and there is a 2.8 cm exophytic component or node  adjacently. The appearance is suspicious for a neuroendocrine tumor. The  mass significantly narrows the downstream portion of the common bile  duct, but there is no intrahepatic biliary dilatation and there is  pneumobilia. There is mild dilatation of the main pancreatic duct. For  tissue diagnosis, endoscopic ultrasound with FNA is recommended.  2. There are a few slightly hyperdense right renal nodules which likely  represent proteinaceous cysts. There are no enhancing or suspicious  renal lesions.       Assessment & Plan   *Well-differentiated pancreatic neuroendocrine tumor   Incidentally noted to have a hyperenhancing pancreatic head mass 3.6 cm with an adjacent 2.8 cm exophytic component or lymph node suspicious for NET radiographically by CT 7/26/2023  Laboratory studies showed a glucagon of 143, gastrin 236, somatostatin 21, CEA 3.09, CA 19-9 44.3, chromogranin A 2296, proinsulin 6.3, insulin 7.3, pancreatic polypeptide 643.6.  EGD on 8/4/2023 showing small hiatal hernia, erythema with erosions in the gastric antrum and body suggestive of erosive gastritis.  An endoscopic ultrasound was performed showing a pancreatic mass 3.5 x 3.0 cm in the pancreatic head and a second mass more circumscribed adjacently possibly communicating.  Fine-needle aspiration of the mass was performed showing a well-differentiated pancreatic neuroendocrine tumor  Detectnet scan for staging of pancreatic neuroendocrine tumor-I doubt the patient is a  candidate for surgery regardless given her age and P  Increase PPI to twice daily dosing given the elevated gastrin level and findings of erosive gastritis on EGD   after the Detectnet is completed begin octreotide 20 mg LAR monthly which will hopefully help diarrhea  Follow-up 2 weeks after Detectnet to reassess symptoms and review results with the patient  Patient has been started on Sandostatin.  Has had improvement in her diarrhea since initiation of this.     *Chronic diarrhea x1 year, hyperglycemia (but diabetic)     *Normocytic, normochromic anemia-hemoglobin 11.3 likely secondary to CKD 3   B12 572, folate 10.9, ferritin 32, iron sat 11%   Hemoglobin improved 8/28/2023 12.8  10/16/2023 hemoglobin 12.6.      *Comorbidities include venous insufficiency, hypertension, hyperlipidemia, CKD, hypothyroidism, advanced age-PS ECOG 2     Oncology plan/recommendations:  Continue with Sandostatin today.  Follow up in 4 weeks with Dr. Gutierrez with repeat labs, reevaluation, continued Sandostatin.  Call/ return sooner should the patient develop any new concerns or problems.    Patient is on a high risk medication requiring close monitoring for toxicity.                10/16/2023      CC:

## 2023-10-19 DIAGNOSIS — M51.36 LUMBAR DEGENERATIVE DISC DISEASE: ICD-10-CM

## 2023-10-19 RX ORDER — HYDROCODONE BITARTRATE AND ACETAMINOPHEN 5; 325 MG/1; MG/1
1 TABLET ORAL EVERY 6 HOURS PRN
Qty: 12 TABLET | Refills: 0 | OUTPATIENT
Start: 2023-10-19

## 2023-10-19 NOTE — TELEPHONE ENCOUNTER
Caller: Alisia Guidrygopal LIMA    Relationship: Self    Best call back number: [phone]     Requested Prescriptions:   pregabalin (LYRICA) 50 MG capsule ()        Pharmacy where request should be sent: [facility] [address] [phone] [facility] [phone] FX     Last office visit with prescribing clinician: 2023   Last telemedicine visit with prescribing clinician: Visit date not found   Next office visit with prescribing clinician: Visit date not found     Additional details provided by patient: SHE TAKES 3 A DAY.  SHE NEEDS A NEW PRESCRIPTION  CAPSULES FOR 3 MONTHS.  PLEASE ADVISE    CAN YOU SEND OVER A PRESCRIPTION TO [facility] FOR 42 TABLETS BECAUSE OF THE DELAY IN RECEIVING THE MEDICATION FROM [facility] #[id number] [facility] [address] AT [facility] [phone] [facility] [phone] FX     Does the patient have less than a 3 day supply:  [] Yes  [] No    Would you like a call back once the refill request has been completed: [x] Yes [] No    If the office needs to give you a call back, can they leave a voicemail: [] Yes [] No    Ulisses Keys   10/19/23 10:30 EDT

## 2023-10-20 ENCOUNTER — TELEPHONE (OUTPATIENT)
Dept: CASE MANAGEMENT | Facility: OTHER | Age: 83
End: 2023-10-20
Payer: MEDICARE

## 2023-10-20 DIAGNOSIS — Z79.4 TYPE 2 DIABETES MELLITUS WITH HYPERGLYCEMIA, WITH LONG-TERM CURRENT USE OF INSULIN: ICD-10-CM

## 2023-10-20 DIAGNOSIS — G62.9 NEUROPATHY: ICD-10-CM

## 2023-10-20 DIAGNOSIS — E11.65 TYPE 2 DIABETES MELLITUS WITH HYPERGLYCEMIA, WITH LONG-TERM CURRENT USE OF INSULIN: ICD-10-CM

## 2023-10-20 RX ORDER — INSULIN LISPRO 100 [IU]/ML
INJECTION, SOLUTION INTRAVENOUS; SUBCUTANEOUS
Qty: 27 ML | Refills: 0 | Status: SHIPPED | OUTPATIENT
Start: 2023-10-20 | End: 2023-10-23 | Stop reason: SDUPTHER

## 2023-10-20 NOTE — TELEPHONE ENCOUNTER
Caller: Halie Guidry    Relationship: Self    Best call back number: [phone]     Requested Prescriptions:   Requested Prescriptions     Pending Prescriptions Disp Refills    pregabalin (LYRICA) 50 MG capsule 9 capsule 0     Sig: Take 1 capsule by mouth 3 (Three) Times a Day for 3 days.        Pharmacy where request should be sent: [facility] #[id number] [address]  AT [facility] [phone] [facility] [phone] FX     Last office visit with prescribing clinician: 5/25/2023   Last telemedicine visit with prescribing clinician: Visit date not found   Next office visit with prescribing clinician: Visit date not found     Additional details provided by patient:     Does the patient have less than a 3 day supply:  [x] Yes  [] No    Would you like a call back once the refill request has been completed: [x] Yes [] No    If the office needs to give you a call back, can they leave a voicemail: [x] Yes [] No    April Jaylen Cerda Rep   10/20/23 13:26 EDT

## 2023-10-20 NOTE — TELEPHONE ENCOUNTER
PATIENT CALLED. SHE IS PUT OF HER HUMALOG.    CAN WE SEND A ONE TIME ORDER TO [facility] ON [address] SO SHE DOESN'T GO ALL WEEKEND WITHOUT IT.    CAN WE ALSO SEND A REFILL TO [facility] SO HER NEXT FILL SHE CAN GET IT THROUGH THEM?

## 2023-10-23 ENCOUNTER — PATIENT OUTREACH (OUTPATIENT)
Dept: CASE MANAGEMENT | Facility: OTHER | Age: 83
End: 2023-10-23
Payer: MEDICARE

## 2023-10-23 DIAGNOSIS — Z79.4 TYPE 2 DIABETES MELLITUS WITH HYPERGLYCEMIA, WITH LONG-TERM CURRENT USE OF INSULIN: Primary | ICD-10-CM

## 2023-10-23 DIAGNOSIS — E11.65 TYPE 2 DIABETES MELLITUS WITH HYPERGLYCEMIA, WITH LONG-TERM CURRENT USE OF INSULIN: Primary | ICD-10-CM

## 2023-10-23 DIAGNOSIS — Z79.4 TYPE 2 DIABETES MELLITUS WITH HYPERGLYCEMIA, WITH LONG-TERM CURRENT USE OF INSULIN: ICD-10-CM

## 2023-10-23 DIAGNOSIS — E11.65 TYPE 2 DIABETES MELLITUS WITH HYPERGLYCEMIA, WITH LONG-TERM CURRENT USE OF INSULIN: ICD-10-CM

## 2023-10-23 DIAGNOSIS — G62.9 NEUROPATHY: ICD-10-CM

## 2023-10-23 DIAGNOSIS — E78.5 HYPERLIPIDEMIA, UNSPECIFIED HYPERLIPIDEMIA TYPE: ICD-10-CM

## 2023-10-23 RX ORDER — PREGABALIN 50 MG/1
50 CAPSULE ORAL 3 TIMES DAILY
Qty: 9 CAPSULE | Refills: 0 | OUTPATIENT
Start: 2023-10-23 | End: 2023-10-26

## 2023-10-23 RX ORDER — INSULIN LISPRO 100 [IU]/ML
INJECTION, SOLUTION INTRAVENOUS; SUBCUTANEOUS
Qty: 27 ML | Refills: 0 | Status: SHIPPED | OUTPATIENT
Start: 2023-10-23 | End: 2023-10-24 | Stop reason: SDUPTHER

## 2023-10-23 RX ORDER — PREGABALIN 50 MG/1
50 CAPSULE ORAL 3 TIMES DAILY
Qty: 90 CAPSULE | Refills: 0 | OUTPATIENT
Start: 2023-10-23 | End: 2023-10-23 | Stop reason: SDUPTHER

## 2023-10-23 RX ORDER — PREGABALIN 50 MG/1
50 CAPSULE ORAL 3 TIMES DAILY
Qty: 90 CAPSULE | Refills: 0 | Status: SHIPPED | OUTPATIENT
Start: 2023-10-23 | End: 2023-11-22

## 2023-10-23 NOTE — OUTREACH NOTE
AMBULATORY CASE MANAGEMENT NOTE    Name and Relationship of Patient/Support Person: Halie Guidry C - Self    CCM Interim Update    Spoke with patient at this time, identified self and role.  Discussed that PCP has sent in Lyrica prescription to [facility] for 30 day supply per patient request.  Advised patient to contact PCP office again in approx 3 weeks to have a refill sent to her [facility] as per her request.  Patient verbalizes understanding.  Patient states she has been having issues with getting her Humolog, states that her Endocrinologist left the practice and she has a new MD.  She says she contacted their office last week and they said they would work on getting her Humolog script sent in.  Advised patient to f/u with the office again today.    Discussed assistance with chronic disease management through CCM, patient declines CCM, states she thinks she is doing ok for now.  Patient denies any other needs, advised patient to contact PCP office with any further needs.  Will close program.          Eloise LOONEY  Ambulatory Case Management    10/23/2023, 10:16 EDT

## 2023-10-23 NOTE — TELEPHONE ENCOUNTER
Patient left message on CM VM stating that she is almost out of her Lyrica and needs it to be refilled.  She states that it was declined by Dr. Mead, but he was the one who has been filling it prior, and voiced concern that the last time she ran out of Lyrica she ended up in the hospital.  Patient requesting Lyrica be sent to [facility] on [address].  Routed request to PCP.

## 2023-10-23 NOTE — TELEPHONE ENCOUNTER
Incoming Refill Request      Medication requested (name and dose): [facility]    Pharmacy where request should be sent: [facility]    Additional details provided by patient:     Best call back number: [phone]    Does the patient have less than a 3 day supply:  [] Yes  [] No    Jaylen Choi Rep  10/23/23, 10:57 EDT

## 2023-10-24 ENCOUNTER — TELEPHONE (OUTPATIENT)
Dept: ENDOCRINOLOGY | Age: 83
End: 2023-10-24

## 2023-10-24 DIAGNOSIS — Z79.4 TYPE 2 DIABETES MELLITUS WITH HYPERGLYCEMIA, WITH LONG-TERM CURRENT USE OF INSULIN: ICD-10-CM

## 2023-10-24 DIAGNOSIS — E11.65 TYPE 2 DIABETES MELLITUS WITH HYPERGLYCEMIA, WITH LONG-TERM CURRENT USE OF INSULIN: ICD-10-CM

## 2023-10-24 RX ORDER — INSULIN LISPRO 100 [IU]/ML
INJECTION, SOLUTION INTRAVENOUS; SUBCUTANEOUS
Qty: 27 ML | Refills: 0 | Status: SHIPPED | OUTPATIENT
Start: 2023-10-24

## 2023-10-24 NOTE — TELEPHONE ENCOUNTER
DELETE AFTER REVIEWING: Send the encounter HIGH priority, If patient has less than a 3 day supply. If the patient will run out of medication over the weekend add that information to the additional details line. Send this encounter to the clinical pool.    Caller: Halie Guidry JNAIE    Relationship: Self    Best call back number: [phone]    Requested Prescriptions: HUMALOG  Requested Prescriptions      No prescriptions requested or ordered in this encounter        Pharmacy where request should be sent:      [facility] #[id number] [place], [place] - [address]  AT [facility] [phone] [facility] [phone]  [phone] [address] [place], [place] [address]  [facility] [phone]       Last office visit with prescribing clinician: 8/24/2023   Last telemedicine visit with prescribing clinician: Visit date not found   Next office visit with prescribing clinician: Visit date not found     Additional details provided by patient: PT IS COMPLETELY OUT OF MEDICATION NORMALLY USES [facility] BUT SHE IS COMPLETELY OUT ASKED IF WE CAN SEND THE MEDICATION TO   [facility] #[id number] [facility] [address] [place]  AT [facility] [phone] [facility] [phone]  [phone] [address] [place], [place] [address]  [facility] [address]       Does the patient have less than a 3 day supply:  [x] Yes  [] No    Would you like a call back once the refill request has been completed: [x] Yes [] No    If the office needs to give you a call back, can they leave a voicemail: [x] Yes [] No    Jaylen Francis Rep   10/24/23 10:38 EDT

## 2023-10-24 NOTE — TELEPHONE ENCOUNTER
Rx Refill Note  Requested Prescriptions     Pending Prescriptions Disp Refills    insulin lispro (HUMALOG/ADMELOG) 100 UNIT/ML injection 27 mL 0     Si units plus 2 units per 50 over 150 before meals.  Max daily dose of 90 units.      Last office visit with prescribing clinician: 2023   Last telemedicine visit with prescribing clinician: Visit date not found   Next office visit with prescribing clinician: Visit date not found                         Would you like a call back once the refill request has been completed: [] Yes [] No    If the office needs to give you a call back, can they leave a voicemail: [] Yes [] No    Grecia Jenkins  10/24/23, 10:51 EDT

## 2023-11-07 NOTE — PROGRESS NOTES
.REASON FOR FOLLOW-UP:   pancreatic neuroendocrine tumor    History of Present Illness    This is a pleasant 83-year-old woman with type 2 diabetes, hypothyroidism, hypertension, stage IIIb CKD, anxiety disorder initially admitted on 7/21/2023 for generalized weakness and difficulty ambulating about her house.  She had a chest x-ray performed on 7/21/2023 which showed possible soft tissue in the right hilum and a follow-up CT chest was performed 7/24/2023 showing numerous mediastinal and right hilar lymph nodes predominantly calcified favored to represent prior granulomatous disease.  Visualized upper abdomen showed pneumobilia, calcified splenic granulomas, bilateral renal cortical lesions, intermediate density lesion upper pole of the right kidney 2 cm, small hyperdense lesion midpole of the kidney for which a CT pre and postcontrast renal protocol was recommended.  A CT abdomen/pelvis with and without contrast was performed on 7/26/23 which showed a slightly hyperdense slightly lobular partially exophytic nodule upper pole the right kidney with no postcontrast enhancement and a subcentimeter hyperdense nodule immediately stable in size.  There were no enhancing renal lesions.  Urinary bladder was thickened but nearly completely collapsed.  In the pancreatic head there was mild enhancement of a suspected mass 3.6 cm in diameter narrowing the common bile duct and a 2.8 x 2.2 cm lymph node or exophytic component inferiorly with mild dilation of the pancreatic duct.  The mass was suspicious for a neuroendocrine tumor radiographically.     The patient reports chronic diarrhea over the previous year or so but no weight loss or abdominal pain.  Blood sugars have been running elevated.  She denies facial flushing or hypoglycemic events.  Diarrhea has been worse over the past 2 to 3 weeks with multiple episodes of stool incontinence.    Laboratory studies showed a glucagon of 143, gastrin 236, somatostatin 21, CEA 3.09,  CA 19-9 44.3, chromogranin A 2296, proinsulin 6.3, insulin 7.3, pancreatic polypeptide 643.6.    After discharge the patient underwent an EGD on 8/4/2023 showing small hiatal hernia, erythema with erosions in the gastric antrum and body suggestive of erosive gastritis.  An endoscopic ultrasound was performed showing a pancreatic mass 3.5 x 3.0 cm in the pancreatic head and a second mass more circumscribed adjacently possibly communicating.  Fine-needle aspiration of the mass was performed showing a well-differentiated pancreatic neuroendocrine tumor    A dotatate scan on 9/13/2023 showed a pancreatic head mass 3.9 cm and adjacent positive peripancreatic lymph nodes consistent with metastatic disease, no visceral disease.    The patient was felt to be a poor candidate for surgery and initiated octreotide 20 mg on 9/13/2023.    She is seen in follow-up today continue to have diarrhea.  She complains of lower extremity edema and weight gain.    Past Medical History, Past Surgical History, Social History, Family History have been reviewed and are without significant changes except as mentioned.    Review of Systems   Constitutional:  Positive for activity change and fatigue. Negative for unexpected weight change.   HENT: Negative.     Respiratory: Negative.     Cardiovascular:  Positive for leg swelling.   Gastrointestinal:  Positive for diarrhea.   Genitourinary: Negative.    Musculoskeletal:  Positive for gait problem. Negative for myalgias.   Neurological:  Positive for weakness. Negative for light-headedness.   Hematological: Negative.    Psychiatric/Behavioral:  Positive for dysphoric mood. Negative for confusion.           Medications:  The current medication list was reviewed in the EMR    ALLERGIES:    Allergies   Allergen Reactions    Sulfa Antibiotics Unknown - High Severity     Pt says it was a long time ago and I don't remember but it wasn't good.        Objective      Vitals:    11/13/23 1504   BP: 177/80  "  Pulse: 64   Resp: 18   Temp: 98.2 °F (36.8 °C)   TempSrc: Infrared   SpO2: 95%   Weight: Comment: unable to get on scales   Height: 172.7 cm (67.99\")   PainSc:   5   PainLoc: Generalized  Comment: Pain all over body         11/13/2023     3:19 PM   Current Status   ECOG score 2       Physical Exam    CONSTITUTIONAL: pleasant well-developed adult woman  HEENT: no icterus, no thrush, moist membranes  LYMPH: no cervical or supraclavicular lad  CV: RRR, S1S2, no murmur  RESP: cta bilat, no wheezing, no rales  GI: soft, non-tender, no splenomegaly, +bs  MUSC: 2-3+ bilateral lower extremity edema symmetric, ambulates with a rolling walker  NEURO: alert and oriented x3, mild global weakness  PSYCH: Dysphoric mood    RECENT LABS:  Hematology WBC   Date Value Ref Range Status   11/13/2023 12.68 (H) 3.40 - 10.80 10*3/mm3 Final   01/18/2021 9.4 3.4 - 10.8 x10E3/uL Final     RBC   Date Value Ref Range Status   11/13/2023 4.78 3.77 - 5.28 10*6/mm3 Final   01/18/2021 4.59 3.77 - 5.28 x10E6/uL Final     Hemoglobin   Date Value Ref Range Status   11/13/2023 13.0 12.0 - 15.9 g/dL Final     Hematocrit   Date Value Ref Range Status   11/13/2023 42.1 34.0 - 46.6 % Final     Platelets   Date Value Ref Range Status   11/13/2023 342 140 - 450 10*3/mm3 Final     Lab Results   Component Value Date    GLUCOSE 175 (H) 11/13/2023    BUN 25 (H) 11/13/2023    CREATININE 1.11 (H) 11/13/2023    EGFRRESULT 42.3 (L) 06/02/2023    EGFR 49.4 (L) 11/13/2023    BCR 22.5 11/13/2023    K 4.3 11/13/2023    CO2 21.6 (L) 11/13/2023    CALCIUM 9.2 11/13/2023    PROTENTOTREF 7.2 06/02/2023    ALBUMIN 3.7 11/13/2023    BILITOT 0.4 11/13/2023    AST 16 11/13/2023    ALT 9 11/13/2023          CT ABD/PELVIS 7/26/23:  IMPRESSION:  1. There is a hyperenhancing mass at the pancreatic head measuring  approximately 3.6 cm and there is a 2.8 cm exophytic component or node  adjacently. The appearance is suspicious for a neuroendocrine tumor. The  mass significantly " narrows the downstream portion of the common bile  duct, but there is no intrahepatic biliary dilatation and there is  pneumobilia. There is mild dilatation of the main pancreatic duct. For  tissue diagnosis, endoscopic ultrasound with FNA is recommended.  2. There are a few slightly hyperdense right renal nodules which likely  represent proteinaceous cysts. There are no enhancing or suspicious  renal lesions.       Assessment & Plan   *Well-differentiated pancreatic neuroendocrine tumor   Incidentally noted to have a hyperenhancing pancreatic head mass 3.6 cm with an adjacent 2.8 cm exophytic component or lymph node suspicious for NET radiographically by CT 7/26/2023  Laboratory studies showed a glucagon of 143, gastrin 236, somatostatin 21, CEA 3.09, CA 19-9 44.3, chromogranin A 2296, proinsulin 6.3, insulin 7.3, pancreatic polypeptide 643.6.  EGD on 8/4/2023 showing small hiatal hernia, erythema with erosions in the gastric antrum and body suggestive of erosive gastritis.  An endoscopic ultrasound was performed showing a pancreatic mass 3.5 x 3.0 cm in the pancreatic head and a second mass more circumscribed adjacently possibly communicating.  Fine-needle aspiration of the mass was performed showing a well-differentiated pancreatic neuroendocrine tumor  9/13/2023 dotatate scan-3.9 cm pancreatic head mass SUV 41.8 adjacent peripancreatic lymph node 2.1 cm SUV 38.4  9/13/2023-initiated octreotide 20 mg IM monthly; dose increased to 30 mg monthly 11/13/2023     *Chronic diarrhea x1 year, hyperglycemia (but diabetic)     *Normocytic, normochromic anemia-hemoglobin 11.3 likely secondary to CKD 3   B12 572, folate 10.9, ferritin 32, iron sat 11%   Hemoglobin improved today 1 13.0      *Comorbidities include venous insufficiency, hypertension, hyperlipidemia, CKD, hypothyroidism, advanced age-PS ECOG 2    *Weight gain/lower extremity edema after discontinuation of diuretics few months ago from hospitalization      Oncology plan/recommendations:  Continue octreotide but increase dose to 30 mg monthly IM  Continue high-dose PPI  Restart Lasix 20 mg p.o. daily; recheck BMP 2 weeks  Discussed with patient and son Imodium protocol for diarrhea                    11/13/2023      CC:

## 2023-11-10 DIAGNOSIS — I10 ACCELERATED HYPERTENSION: Chronic | ICD-10-CM

## 2023-11-10 NOTE — TELEPHONE ENCOUNTER
Rx Refill Note  Requested Prescriptions     Pending Prescriptions Disp Refills    amLODIPine (NORVASC) 5 MG tablet [Pharmacy Med Name: AMLODIPINE BESYLATE TABS 5MG] 90 tablet 3     Sig: TAKE 1 TABLET DAILY      Last office visit with prescribing clinician: 5/25/2023   Last telemedicine visit with prescribing clinician: Visit date not found   Next office visit with prescribing clinician: Visit date not found                         Would you like a call back once the refill request has been completed: [] Yes [] No    If the office needs to give you a call back, can they leave a voicemail: [] Yes [] No    Leonard Duke MA  11/10/23, 15:40 EST

## 2023-11-12 RX ORDER — AMLODIPINE BESYLATE 5 MG/1
5 TABLET ORAL DAILY
Qty: 30 TABLET | Refills: 0 | Status: SHIPPED | OUTPATIENT
Start: 2023-11-12

## 2023-11-13 ENCOUNTER — INFUSION (OUTPATIENT)
Dept: ONCOLOGY | Facility: HOSPITAL | Age: 83
End: 2023-11-13
Payer: MEDICARE

## 2023-11-13 ENCOUNTER — OFFICE VISIT (OUTPATIENT)
Dept: ONCOLOGY | Facility: CLINIC | Age: 83
End: 2023-11-13
Payer: MEDICARE

## 2023-11-13 ENCOUNTER — LAB (OUTPATIENT)
Dept: LAB | Facility: HOSPITAL | Age: 83
End: 2023-11-13
Payer: MEDICARE

## 2023-11-13 VITALS
RESPIRATION RATE: 18 BRPM | DIASTOLIC BLOOD PRESSURE: 80 MMHG | SYSTOLIC BLOOD PRESSURE: 177 MMHG | HEIGHT: 68 IN | TEMPERATURE: 98.2 F | OXYGEN SATURATION: 95 % | HEART RATE: 64 BPM | BODY MASS INDEX: 31.36 KG/M2

## 2023-11-13 DIAGNOSIS — C7A.8 PRIMARY MALIGNANT NEUROENDOCRINE TUMOR OF PANCREAS: Primary | ICD-10-CM

## 2023-11-13 DIAGNOSIS — C7A.8 PRIMARY MALIGNANT NEUROENDOCRINE TUMOR OF PANCREAS: ICD-10-CM

## 2023-11-13 DIAGNOSIS — N18.31 STAGE 3A CHRONIC KIDNEY DISEASE: ICD-10-CM

## 2023-11-13 DIAGNOSIS — Z79.899 ENCOUNTER FOR LONG-TERM (CURRENT) USE OF HIGH-RISK MEDICATION: ICD-10-CM

## 2023-11-13 LAB
ALBUMIN SERPL-MCNC: 3.7 G/DL (ref 3.5–5.2)
ALBUMIN/GLOB SERPL: 1.1 G/DL
ALP SERPL-CCNC: 113 U/L (ref 39–117)
ALT SERPL W P-5'-P-CCNC: 9 U/L (ref 1–33)
ANION GAP SERPL CALCULATED.3IONS-SCNC: 21.4 MMOL/L (ref 5–15)
AST SERPL-CCNC: 16 U/L (ref 1–32)
BASOPHILS # BLD AUTO: 0.07 10*3/MM3 (ref 0–0.2)
BASOPHILS NFR BLD AUTO: 0.6 % (ref 0–1.5)
BILIRUB SERPL-MCNC: 0.4 MG/DL (ref 0–1.2)
BUN SERPL-MCNC: 25 MG/DL (ref 8–23)
BUN/CREAT SERPL: 22.5 (ref 7–25)
CALCIUM SPEC-SCNC: 9.2 MG/DL (ref 8.6–10.5)
CHLORIDE SERPL-SCNC: 101 MMOL/L (ref 98–107)
CO2 SERPL-SCNC: 21.6 MMOL/L (ref 22–29)
CREAT SERPL-MCNC: 1.11 MG/DL (ref 0.6–1.1)
DEPRECATED RDW RBC AUTO: 49.3 FL (ref 37–54)
EGFRCR SERPLBLD CKD-EPI 2021: 49.4 ML/MIN/1.73
EOSINOPHIL # BLD AUTO: 0.04 10*3/MM3 (ref 0–0.4)
EOSINOPHIL NFR BLD AUTO: 0.3 % (ref 0.3–6.2)
ERYTHROCYTE [DISTWIDTH] IN BLOOD BY AUTOMATED COUNT: 15.3 % (ref 12.3–15.4)
GLOBULIN UR ELPH-MCNC: 3.5 GM/DL
GLUCOSE SERPL-MCNC: 175 MG/DL (ref 65–99)
HCT VFR BLD AUTO: 42.1 % (ref 34–46.6)
HGB BLD-MCNC: 13 G/DL (ref 12–15.9)
IMM GRANULOCYTES # BLD AUTO: 0.08 10*3/MM3 (ref 0–0.05)
IMM GRANULOCYTES NFR BLD AUTO: 0.6 % (ref 0–0.5)
LYMPHOCYTES # BLD AUTO: 1 10*3/MM3 (ref 0.7–3.1)
LYMPHOCYTES NFR BLD AUTO: 7.9 % (ref 19.6–45.3)
MAGNESIUM SERPL-MCNC: 1.7 MG/DL (ref 1.6–2.4)
MCH RBC QN AUTO: 27.2 PG (ref 26.6–33)
MCHC RBC AUTO-ENTMCNC: 30.9 G/DL (ref 31.5–35.7)
MCV RBC AUTO: 88.1 FL (ref 79–97)
MONOCYTES # BLD AUTO: 0.55 10*3/MM3 (ref 0.1–0.9)
MONOCYTES NFR BLD AUTO: 4.3 % (ref 5–12)
NEUTROPHILS NFR BLD AUTO: 10.94 10*3/MM3 (ref 1.7–7)
NEUTROPHILS NFR BLD AUTO: 86.3 % (ref 42.7–76)
NRBC BLD AUTO-RTO: 0 /100 WBC (ref 0–0.2)
PLATELET # BLD AUTO: 342 10*3/MM3 (ref 140–450)
PMV BLD AUTO: 9.4 FL (ref 6–12)
POTASSIUM SERPL-SCNC: 4.3 MMOL/L (ref 3.5–5.2)
PROT SERPL-MCNC: 7.2 G/DL (ref 6–8.5)
RBC # BLD AUTO: 4.78 10*6/MM3 (ref 3.77–5.28)
SODIUM SERPL-SCNC: 144 MMOL/L (ref 136–145)
WBC NRBC COR # BLD: 12.68 10*3/MM3 (ref 3.4–10.8)

## 2023-11-13 PROCEDURE — 36415 COLL VENOUS BLD VENIPUNCTURE: CPT

## 2023-11-13 PROCEDURE — 3079F DIAST BP 80-89 MM HG: CPT | Performed by: INTERNAL MEDICINE

## 2023-11-13 PROCEDURE — 99214 OFFICE O/P EST MOD 30 MIN: CPT | Performed by: INTERNAL MEDICINE

## 2023-11-13 PROCEDURE — 1125F AMNT PAIN NOTED PAIN PRSNT: CPT | Performed by: INTERNAL MEDICINE

## 2023-11-13 PROCEDURE — 85025 COMPLETE CBC W/AUTO DIFF WBC: CPT

## 2023-11-13 PROCEDURE — 96372 THER/PROPH/DIAG INJ SC/IM: CPT

## 2023-11-13 PROCEDURE — 83735 ASSAY OF MAGNESIUM: CPT

## 2023-11-13 PROCEDURE — 3077F SYST BP >= 140 MM HG: CPT | Performed by: INTERNAL MEDICINE

## 2023-11-13 PROCEDURE — 80053 COMPREHEN METABOLIC PANEL: CPT

## 2023-11-13 PROCEDURE — 25010000002 OCTREOTIDE PER 1 MG: Performed by: INTERNAL MEDICINE

## 2023-11-13 RX ORDER — FUROSEMIDE 20 MG/1
20 TABLET ORAL DAILY
Qty: 30 TABLET | Refills: 3 | Status: SHIPPED | OUTPATIENT
Start: 2023-11-13

## 2023-11-13 RX ORDER — DICYCLOMINE HCL 20 MG
1 TABLET ORAL 3 TIMES DAILY PRN
COMMUNITY

## 2023-11-13 RX ORDER — HYDROCODONE BITARTRATE AND ACETAMINOPHEN 10; 325 MG/1; MG/1
1 TABLET ORAL 3 TIMES DAILY
COMMUNITY
Start: 2023-10-25

## 2023-11-13 RX ADMIN — OCTREOTIDE ACETATE 30 MG: KIT at 15:48

## 2023-11-15 RX ORDER — LANSOPRAZOLE 30 MG/1
30 CAPSULE, DELAYED RELEASE ORAL DAILY
Qty: 30 CAPSULE | Refills: 11 | Status: SHIPPED | OUTPATIENT
Start: 2023-11-15

## 2023-11-27 DIAGNOSIS — G62.9 NEUROPATHY: ICD-10-CM

## 2023-11-27 RX ORDER — PREGABALIN 50 MG/1
50 CAPSULE ORAL 3 TIMES DAILY
Qty: 90 CAPSULE | Refills: 0 | OUTPATIENT
Start: 2023-11-27 | End: 2023-12-27

## 2023-11-27 NOTE — TELEPHONE ENCOUNTER
Caller: Alisia Guidrygopal LIMA    Relationship: Self    Best call back number: [phone]     Requested Prescriptions:   Requested Prescriptions     Pending Prescriptions Disp Refills    pregabalin (LYRICA) 50 MG capsule 90 capsule 0     Sig: Take 1 capsule by mouth 3 (Three) Times a Day for 30 days.        Pharm[facility] where request [facility] be sent: [id number] [address]  AT [facility] [phone] [facility] [phone] FX     Last office visit with prescribing clinician: 5/25/2023   Last telemedicine visit with prescribing clinician: Visit date not found   Next office visit with prescribing clinician: Visit date not found     Additional details provided by patient: PATIENT WILL BE OUT OF THIS MEDICATION TONIGHT. PATIENT WAS NEVER ADVISED BY PHARMACY THAT SHE WOULD NEED AN APPOINTMENT BEFORE HER PRESCRIPTION RAN OUT. PATIENT STATES SHE IS SICK RIGHT NOW AND UNABLE TO COME IN FOR AN APPOINTMENT. PATIENT IS WORRIED TO GO WITHOUT THIS MEDICATION. PLEASE ADVISE.     Does the patient have less than a 3 day supply:  [x] Yes  [] No    Would you like a call back once the refill request has been completed: [x] Yes [] No    If the office needs to give you a call back, can they leave a voicemail: [x] Yes [] No    Jaylen Corral Rep   11/27/23 15:38 EST

## 2023-11-28 ENCOUNTER — TELEPHONE (OUTPATIENT)
Dept: FAMILY MEDICINE CLINIC | Facility: CLINIC | Age: 83
End: 2023-11-28
Payer: MEDICARE

## 2023-11-28 DIAGNOSIS — G62.9 NEUROPATHY: ICD-10-CM

## 2023-11-28 RX ORDER — PREGABALIN 50 MG/1
50 CAPSULE ORAL 3 TIMES DAILY
Qty: 30 CAPSULE | Refills: 0 | Status: SHIPPED | OUTPATIENT
Start: 2023-11-28 | End: 2023-12-28

## 2023-11-28 NOTE — TELEPHONE ENCOUNTER
PATIENT WILL NEED TO SCHEDULE AN APPOINTMENT. PT NEEDS TO BE SEEN EVERY 3 MONTHS. FOR THIS MEDICATION TO BE REFILLED  -  Return in about 6 months (around 9/1/2023) for Recheck.

## 2023-11-28 NOTE — TELEPHONE ENCOUNTER
Pt is out of      pregabalin (LYRICA) 50 MG capsule 90 capsule 0       Sig: Take 1 capsule by mouth 3 (Three) Times a Day for 30 days.   She needs it to be refilled asap.  She said that it doesn't not have to [facility] if that is the problem.  She is afraid that she will get sick like last year if she doesn't get her medication soon.  Please give her a call back  [phone]

## 2023-11-29 NOTE — TELEPHONE ENCOUNTER
Left message for pt. Pt due appointment -  That one is 6 months (not 3), but she'd due an appt. I sent a 10 day supply, so please let her know.   Ok for the hub to relay message

## 2023-11-30 ENCOUNTER — TELEPHONE (OUTPATIENT)
Dept: ONCOLOGY | Facility: CLINIC | Age: 83
End: 2023-11-30
Payer: MEDICARE

## 2023-11-30 DIAGNOSIS — E11.65 TYPE 2 DIABETES MELLITUS WITH HYPERGLYCEMIA, WITH LONG-TERM CURRENT USE OF INSULIN: ICD-10-CM

## 2023-11-30 DIAGNOSIS — Z79.4 TYPE 2 DIABETES MELLITUS WITH HYPERGLYCEMIA, WITH LONG-TERM CURRENT USE OF INSULIN: ICD-10-CM

## 2023-11-30 RX ORDER — INSULIN LISPRO 100 [IU]/ML
INJECTION, SOLUTION INTRAVENOUS; SUBCUTANEOUS
Qty: 15 ML | Refills: 20 | OUTPATIENT
Start: 2023-11-30

## 2023-12-05 DIAGNOSIS — I10 ACCELERATED HYPERTENSION: Chronic | ICD-10-CM

## 2023-12-06 RX ORDER — AMLODIPINE BESYLATE 5 MG/1
5 TABLET ORAL DAILY
Qty: 14 TABLET | Refills: 0 | Status: SHIPPED | OUTPATIENT
Start: 2023-12-06

## 2023-12-11 ENCOUNTER — INFUSION (OUTPATIENT)
Dept: ONCOLOGY | Facility: HOSPITAL | Age: 83
End: 2023-12-11
Payer: MEDICARE

## 2023-12-11 ENCOUNTER — OFFICE VISIT (OUTPATIENT)
Dept: ONCOLOGY | Facility: CLINIC | Age: 83
End: 2023-12-11
Payer: MEDICARE

## 2023-12-11 ENCOUNTER — LAB (OUTPATIENT)
Dept: LAB | Facility: HOSPITAL | Age: 83
End: 2023-12-11
Payer: MEDICARE

## 2023-12-11 VITALS
BODY MASS INDEX: 27.95 KG/M2 | RESPIRATION RATE: 18 BRPM | SYSTOLIC BLOOD PRESSURE: 182 MMHG | OXYGEN SATURATION: 93 % | TEMPERATURE: 97.7 F | DIASTOLIC BLOOD PRESSURE: 77 MMHG | HEIGHT: 68 IN | WEIGHT: 184.4 LBS | HEART RATE: 72 BPM

## 2023-12-11 DIAGNOSIS — Z79.899 ENCOUNTER FOR LONG-TERM (CURRENT) USE OF HIGH-RISK MEDICATION: Primary | ICD-10-CM

## 2023-12-11 DIAGNOSIS — C7A.8 PRIMARY MALIGNANT NEUROENDOCRINE TUMOR OF PANCREAS: ICD-10-CM

## 2023-12-11 DIAGNOSIS — C7A.8 PRIMARY MALIGNANT NEUROENDOCRINE TUMOR OF PANCREAS: Primary | ICD-10-CM

## 2023-12-11 DIAGNOSIS — Z79.899 ENCOUNTER FOR LONG-TERM (CURRENT) USE OF HIGH-RISK MEDICATION: ICD-10-CM

## 2023-12-11 LAB
ALBUMIN SERPL-MCNC: 4 G/DL (ref 3.5–5.2)
ALBUMIN/GLOB SERPL: 1 G/DL
ALP SERPL-CCNC: 138 U/L (ref 39–117)
ALT SERPL W P-5'-P-CCNC: 13 U/L (ref 1–33)
ANION GAP SERPL CALCULATED.3IONS-SCNC: 14.1 MMOL/L (ref 5–15)
AST SERPL-CCNC: 19 U/L (ref 1–32)
BASOPHILS # BLD AUTO: 0.12 10*3/MM3 (ref 0–0.2)
BASOPHILS NFR BLD AUTO: 1.1 % (ref 0–1.5)
BILIRUB SERPL-MCNC: 0.5 MG/DL (ref 0–1.2)
BUN SERPL-MCNC: 33 MG/DL (ref 8–23)
BUN/CREAT SERPL: 25.4 (ref 7–25)
CALCIUM SPEC-SCNC: 9.2 MG/DL (ref 8.6–10.5)
CHLORIDE SERPL-SCNC: 98 MMOL/L (ref 98–107)
CO2 SERPL-SCNC: 26.9 MMOL/L (ref 22–29)
CREAT SERPL-MCNC: 1.3 MG/DL (ref 0.57–1)
DEPRECATED RDW RBC AUTO: 49.5 FL (ref 37–54)
EGFRCR SERPLBLD CKD-EPI 2021: 40.9 ML/MIN/1.73
EOSINOPHIL # BLD AUTO: 0.38 10*3/MM3 (ref 0–0.4)
EOSINOPHIL NFR BLD AUTO: 3.6 % (ref 0.3–6.2)
ERYTHROCYTE [DISTWIDTH] IN BLOOD BY AUTOMATED COUNT: 15.4 % (ref 12.3–15.4)
GLOBULIN UR ELPH-MCNC: 4 GM/DL
GLUCOSE SERPL-MCNC: 284 MG/DL (ref 65–99)
HCT VFR BLD AUTO: 41.3 % (ref 34–46.6)
HGB BLD-MCNC: 13 G/DL (ref 12–15.9)
IMM GRANULOCYTES # BLD AUTO: 0.07 10*3/MM3 (ref 0–0.05)
IMM GRANULOCYTES NFR BLD AUTO: 0.7 % (ref 0–0.5)
LYMPHOCYTES # BLD AUTO: 1.15 10*3/MM3 (ref 0.7–3.1)
LYMPHOCYTES NFR BLD AUTO: 10.9 % (ref 19.6–45.3)
MCH RBC QN AUTO: 27.8 PG (ref 26.6–33)
MCHC RBC AUTO-ENTMCNC: 31.5 G/DL (ref 31.5–35.7)
MCV RBC AUTO: 88.2 FL (ref 79–97)
MONOCYTES # BLD AUTO: 0.49 10*3/MM3 (ref 0.1–0.9)
MONOCYTES NFR BLD AUTO: 4.7 % (ref 5–12)
NEUTROPHILS NFR BLD AUTO: 79 % (ref 42.7–76)
NEUTROPHILS NFR BLD AUTO: 8.3 10*3/MM3 (ref 1.7–7)
NRBC BLD AUTO-RTO: 0 /100 WBC (ref 0–0.2)
PLATELET # BLD AUTO: 354 10*3/MM3 (ref 140–450)
PMV BLD AUTO: 9.2 FL (ref 6–12)
POTASSIUM SERPL-SCNC: 4.3 MMOL/L (ref 3.5–5.2)
PROT SERPL-MCNC: 8 G/DL (ref 6–8.5)
RBC # BLD AUTO: 4.68 10*6/MM3 (ref 3.77–5.28)
SODIUM SERPL-SCNC: 139 MMOL/L (ref 136–145)
T4 FREE SERPL-MCNC: 1.1 NG/DL (ref 0.93–1.7)
TSH SERPL DL<=0.05 MIU/L-ACNC: 2.11 UIU/ML (ref 0.27–4.2)
WBC NRBC COR # BLD AUTO: 10.51 10*3/MM3 (ref 3.4–10.8)

## 2023-12-11 PROCEDURE — 96372 THER/PROPH/DIAG INJ SC/IM: CPT

## 2023-12-11 PROCEDURE — 85025 COMPLETE CBC W/AUTO DIFF WBC: CPT

## 2023-12-11 PROCEDURE — 84443 ASSAY THYROID STIM HORMONE: CPT | Performed by: INTERNAL MEDICINE

## 2023-12-11 PROCEDURE — 3078F DIAST BP <80 MM HG: CPT | Performed by: NURSE PRACTITIONER

## 2023-12-11 PROCEDURE — 3077F SYST BP >= 140 MM HG: CPT | Performed by: NURSE PRACTITIONER

## 2023-12-11 PROCEDURE — 84439 ASSAY OF FREE THYROXINE: CPT | Performed by: INTERNAL MEDICINE

## 2023-12-11 PROCEDURE — 99214 OFFICE O/P EST MOD 30 MIN: CPT | Performed by: NURSE PRACTITIONER

## 2023-12-11 PROCEDURE — 1125F AMNT PAIN NOTED PAIN PRSNT: CPT | Performed by: NURSE PRACTITIONER

## 2023-12-11 PROCEDURE — 25010000002 OCTREOTIDE PER 1 MG: Performed by: INTERNAL MEDICINE

## 2023-12-11 PROCEDURE — 80053 COMPREHEN METABOLIC PANEL: CPT | Performed by: NURSE PRACTITIONER

## 2023-12-11 PROCEDURE — 36415 COLL VENOUS BLD VENIPUNCTURE: CPT

## 2023-12-11 RX ADMIN — OCTREOTIDE ACETATE 30 MG: KIT at 16:55

## 2023-12-11 NOTE — PROGRESS NOTES
.REASON FOR FOLLOW-UP:   pancreatic neuroendocrine tumor    History of Present Illness    This is a pleasant 83-year-old woman with type 2 diabetes, hypothyroidism, hypertension, stage IIIb CKD, anxiety disorder initially admitted on 7/21/2023 for generalized weakness and difficulty ambulating about her house.  She had a chest x-ray performed on 7/21/2023 which showed possible soft tissue in the right hilum and a follow-up CT chest was performed 7/24/2023 showing numerous mediastinal and right hilar lymph nodes predominantly calcified favored to represent prior granulomatous disease.  Visualized upper abdomen showed pneumobilia, calcified splenic granulomas, bilateral renal cortical lesions, intermediate density lesion upper pole of the right kidney 2 cm, small hyperdense lesion midpole of the kidney for which a CT pre and postcontrast renal protocol was recommended.  A CT abdomen/pelvis with and without contrast was performed on 7/26/23 which showed a slightly hyperdense slightly lobular partially exophytic nodule upper pole the right kidney with no postcontrast enhancement and a subcentimeter hyperdense nodule immediately stable in size.  There were no enhancing renal lesions.  Urinary bladder was thickened but nearly completely collapsed.  In the pancreatic head there was mild enhancement of a suspected mass 3.6 cm in diameter narrowing the common bile duct and a 2.8 x 2.2 cm lymph node or exophytic component inferiorly with mild dilation of the pancreatic duct.  The mass was suspicious for a neuroendocrine tumor radiographically.     The patient reports chronic diarrhea over the previous year or so but no weight loss or abdominal pain.  Blood sugars have been running elevated.  She denies facial flushing or hypoglycemic events.  Diarrhea has been worse over the past 2 to 3 weeks with multiple episodes of stool incontinence.    Laboratory studies showed a glucagon of 143, gastrin 236, somatostatin 21, CEA 3.09,  CA 19-9 44.3, chromogranin A 2296, proinsulin 6.3, insulin 7.3, pancreatic polypeptide 643.6.    After discharge the patient underwent an EGD on 8/4/2023 showing small hiatal hernia, erythema with erosions in the gastric antrum and body suggestive of erosive gastritis.  An endoscopic ultrasound was performed showing a pancreatic mass 3.5 x 3.0 cm in the pancreatic head and a second mass more circumscribed adjacently possibly communicating.  Fine-needle aspiration of the mass was performed showing a well-differentiated pancreatic neuroendocrine tumor    A dotatate scan on 9/13/2023 showed a pancreatic head mass 3.9 cm and adjacent positive peripancreatic lymph nodes consistent with metastatic disease, no visceral disease.    The patient was felt to be a poor candidate for surgery and initiated octreotide 20 mg on 9/13/2023.    Patient returns on 12/11/2023 for follow-up.  She reports his last visit when her octreotide was increased to 30 mg on 11/13/2023 her diarrhea has improved.  She is having regular bowel movements approximately every other day.  She reports her edema improved after the last visit at which time she reinitiated her Lasix 20 mg daily.  She has not taken any of her medications today and her blood pressure is elevated secondary to this.  She also did not eat so she is not feeling well.  We discussed she does not have to fast for her labs in office.    Past Medical History, Past Surgical History, Social History, Family History have been reviewed and are without significant changes except as mentioned.    Review of Systems   Constitutional:  Positive for activity change and fatigue. Negative for unexpected weight change.   HENT: Negative.     Respiratory: Negative.     Cardiovascular:  Positive for leg swelling.   Gastrointestinal:  Positive for diarrhea.   Genitourinary: Negative.    Musculoskeletal:  Positive for gait problem. Negative for myalgias.   Neurological:  Positive for weakness. Negative  "for light-headedness.   Hematological: Negative.    Psychiatric/Behavioral:  Positive for dysphoric mood. Negative for confusion.           Medications:  The current medication list was reviewed in the EMR    ALLERGIES:    Allergies   Allergen Reactions    Sulfa Antibiotics Unknown - High Severity     Pt says it was a long time ago and I don't remember but it wasn't good.        Objective      Vitals:    12/11/23 1558   BP: (!) 182/77   Pulse: 72   Resp: 18   Temp: 97.7 °F (36.5 °C)   TempSrc: Temporal   SpO2: 93%   Weight: 83.6 kg (184 lb 6.4 oz)   Height: 172.7 cm (67.99\")   PainSc:   5   PainLoc: Generalized           12/11/2023     4:05 PM   Current Status   ECOG score 1       Physical Exam    CONSTITUTIONAL: pleasant well-developed adult woman  HEENT: no icterus, no thrush, moist membranes  LYMPH: no cervical or supraclavicular lad  CV: RRR, S1S2, no murmur  RESP: cta bilat, no wheezing, no rales  GI: soft, non-tender, no splenomegaly, +bs  MUSC: 1+ bilateral lower extremity edema symmetric, ambulates with a rolling walker  NEURO: alert and oriented x3, mild global weakness  PSYCH: Dysphoric mood    RECENT LABS:  Hematology WBC   Date Value Ref Range Status   12/11/2023 10.51 3.40 - 10.80 10*3/mm3 Final   01/18/2021 9.4 3.4 - 10.8 x10E3/uL Final     RBC   Date Value Ref Range Status   12/11/2023 4.68 3.77 - 5.28 10*6/mm3 Final   01/18/2021 4.59 3.77 - 5.28 x10E6/uL Final     Hemoglobin   Date Value Ref Range Status   12/11/2023 13.0 12.0 - 15.9 g/dL Final     Hematocrit   Date Value Ref Range Status   12/11/2023 41.3 34.0 - 46.6 % Final     Platelets   Date Value Ref Range Status   12/11/2023 354 140 - 450 10*3/mm3 Final     Lab Results   Component Value Date    GLUCOSE 284 (H) 12/11/2023    BUN 33 (H) 12/11/2023    CREATININE 1.30 (H) 12/11/2023    EGFRRESULT 42.3 (L) 06/02/2023    EGFR 40.9 (L) 12/11/2023    BCR 25.4 (H) 12/11/2023    K 4.3 12/11/2023    CO2 26.9 12/11/2023    CALCIUM 9.2 12/11/2023    " PROTENTOTREF 7.2 06/02/2023    ALBUMIN 4.0 12/11/2023    BILITOT 0.5 12/11/2023    AST 19 12/11/2023    ALT 13 12/11/2023          CT ABD/PELVIS 7/26/23:  IMPRESSION:  1. There is a hyperenhancing mass at the pancreatic head measuring  approximately 3.6 cm and there is a 2.8 cm exophytic component or node  adjacently. The appearance is suspicious for a neuroendocrine tumor. The  mass significantly narrows the downstream portion of the common bile  duct, but there is no intrahepatic biliary dilatation and there is  pneumobilia. There is mild dilatation of the main pancreatic duct. For  tissue diagnosis, endoscopic ultrasound with FNA is recommended.  2. There are a few slightly hyperdense right renal nodules which likely  represent proteinaceous cysts. There are no enhancing or suspicious  renal lesions.       Assessment & Plan   *Well-differentiated pancreatic neuroendocrine tumor   Incidentally noted to have a hyperenhancing pancreatic head mass 3.6 cm with an adjacent 2.8 cm exophytic component or lymph node suspicious for NET radiographically by CT 7/26/2023  Laboratory studies showed a glucagon of 143, gastrin 236, somatostatin 21, CEA 3.09, CA 19-9 44.3, chromogranin A 2296, proinsulin 6.3, insulin 7.3, pancreatic polypeptide 643.6.  EGD on 8/4/2023 showing small hiatal hernia, erythema with erosions in the gastric antrum and body suggestive of erosive gastritis.  An endoscopic ultrasound was performed showing a pancreatic mass 3.5 x 3.0 cm in the pancreatic head and a second mass more circumscribed adjacently possibly communicating.  Fine-needle aspiration of the mass was performed showing a well-differentiated pancreatic neuroendocrine tumor  9/13/2023 dotatate scan-3.9 cm pancreatic head mass SUV 41.8 adjacent peripancreatic lymph node 2.1 cm SUV 38.4  9/13/2023-initiated octreotide 20 mg IM monthly; dose increased to 30 mg monthly 11/13/2023 12/11/2023 reports her diarrhea is better controlled since the  dose increase on octreotide last month.     *Chronic diarrhea x1 year, hyperglycemia (but diabetic)     *Normocytic, normochromic anemia-hemoglobin 11.3 likely secondary to CKD 3   B12 572, folate 10.9, ferritin 32, iron sat 11%   Hemoglobin i stable today at 13      *Comorbidities include venous insufficiency, hypertension, hyperlipidemia, CKD, hypothyroidism, advanced age-PS ECOG 2    *Weight gain/lower extremity edema after discontinuation of diuretics few months ago from hospitalization.  Improvement in weight gain and lower extremity edema since reinitiating Lasix 20 mg daily on 11/13/2023.  Today her creatinine is elevated from 1.11-1.3 and BUN up to 33.  Will reduce furosemide to every other day.     Oncology plan/recommendations:  Team octreotide at 30 mg monthly IM  Continue high-dose PPI  Use Lasix to 20 mg every other day.  Recheck BMP in 2 weeks.  Patient to utilize Imodium if needed for diarrhea.  Return in 1 month with Dr. Gutierrez repeat labs.                    12/11/2023      CC:

## 2023-12-12 ENCOUNTER — TELEPHONE (OUTPATIENT)
Dept: FAMILY MEDICINE CLINIC | Facility: CLINIC | Age: 83
End: 2023-12-12

## 2023-12-12 DIAGNOSIS — G62.9 NEUROPATHY: ICD-10-CM

## 2023-12-12 RX ORDER — PREGABALIN 50 MG/1
50 CAPSULE ORAL 3 TIMES DAILY
Qty: 30 CAPSULE | Refills: 0 | OUTPATIENT
Start: 2023-12-12 | End: 2024-01-11

## 2023-12-12 NOTE — TELEPHONE ENCOUNTER
Patient is due appointment every 3 months for lyrica medication Return in about 6 months (around 9/1/2023) for Recheck.

## 2023-12-12 NOTE — TELEPHONE ENCOUNTER
Caller: YunielHalie walton    Relationship: Self    Best call back number: [phone]     Requested Prescriptions:   Requested Prescriptions     Pending Prescriptions Disp Refills    pregabalin (LYRICA) 50 MG capsule 30 capsule 0     Sig: Take 1 capsule by mouth 3 (Three) Times a Day for 30 days.        Pharmacy where request should be sent: [id number] [address]  AT [facility] [phone] [facility] [phone] FX     Last office visit with prescribing clinician: 5/25/2023   Last telemedicine visit with prescribing clinician: Visit date not found   Next office visit with prescribing clinician: Visit date not found     Additional details provided by patient: PATIENT STATED SHE IS SUPPOSED TO HAVE A QUANTITY  CAPSULES FOR THIS PRESCRIPTION, HOWEVER HAS ONLY RECEIVED 90, THEN 30.    PATIENT IS REQUESTING A PRESCRIPTION BE SENT FOR THE REMAINDER OF THIS MEDICATION, 150 CAPSULES.    PATIENT IS REQUESTING HER NEXT REFILL OF THIS PRESCRIPTION BE THROUGH [facility] WITH A 270 QUANTITY AS USUAL.    PLEASE CONTACT PATIENT WHEN THIS IS DONE    Jaylen Flor Rep   12/12/23 12:52 EST

## 2023-12-12 NOTE — TELEPHONE ENCOUNTER
Caller: Halie Guidry    Relationship: Self    Best call back number: [phone]      What is the best time to reach you: ANY TIME    Who are you requesting to speak with (clinical staff, provider,  specific staff member): CLINICAL STAFF    What was the call regarding: PATIENT STATES THAT SHE WOULD LIKE A CALLBACK TO DISCUSS WHY HER pregabalin (LYRICA) 50 MG capsule  MEDICATION REFILL REQUEST WAS DENIED.  SHE SAYS THAT HE HAS A 1 1/2 DAY SUPPLY OF THIS MEDICINE.    Is it okay if the provider responds through DivX[employer]:     PLEASE ADVISE.

## 2023-12-13 ENCOUNTER — TELEMEDICINE (OUTPATIENT)
Dept: FAMILY MEDICINE CLINIC | Facility: CLINIC | Age: 83
End: 2023-12-13
Payer: MEDICARE

## 2023-12-13 DIAGNOSIS — I10 ACCELERATED HYPERTENSION: Primary | Chronic | ICD-10-CM

## 2023-12-13 DIAGNOSIS — F41.9 ANXIETY: Chronic | ICD-10-CM

## 2023-12-13 DIAGNOSIS — M79.2 NEUROPATHIC PAIN: Chronic | ICD-10-CM

## 2023-12-13 DIAGNOSIS — G62.9 NEUROPATHY: Chronic | ICD-10-CM

## 2023-12-13 PROCEDURE — 1159F MED LIST DOCD IN RCRD: CPT | Performed by: FAMILY MEDICINE

## 2023-12-13 PROCEDURE — 1160F RVW MEDS BY RX/DR IN RCRD: CPT | Performed by: FAMILY MEDICINE

## 2023-12-13 PROCEDURE — 99214 OFFICE O/P EST MOD 30 MIN: CPT | Performed by: FAMILY MEDICINE

## 2023-12-13 RX ORDER — PREGABALIN 50 MG/1
50 CAPSULE ORAL 3 TIMES DAILY
Qty: 270 CAPSULE | Refills: 1 | Status: SHIPPED | OUTPATIENT
Start: 2023-12-13

## 2023-12-13 RX ORDER — DULOXETIN HYDROCHLORIDE 30 MG/1
30 CAPSULE, DELAYED RELEASE ORAL DAILY
Qty: 90 CAPSULE | Refills: 1 | Status: SHIPPED | OUTPATIENT
Start: 2023-12-13

## 2023-12-13 RX ORDER — CHLORTHALIDONE 25 MG/1
25 TABLET ORAL DAILY
Qty: 90 TABLET | Refills: 1 | Status: SHIPPED | OUTPATIENT
Start: 2023-12-13

## 2023-12-13 RX ORDER — AMLODIPINE BESYLATE 5 MG/1
5 TABLET ORAL DAILY
Qty: 90 TABLET | Refills: 1 | Status: SHIPPED | OUTPATIENT
Start: 2023-12-13

## 2023-12-13 RX ORDER — BISOPROLOL FUMARATE 5 MG/1
5 TABLET, FILM COATED ORAL DAILY
Qty: 90 TABLET | Refills: 1 | Status: SHIPPED | OUTPATIENT
Start: 2023-12-13

## 2023-12-13 RX ORDER — LISINOPRIL 40 MG/1
40 TABLET ORAL DAILY
Qty: 90 TABLET | Refills: 1 | Status: SHIPPED | OUTPATIENT
Start: 2023-12-13

## 2023-12-13 NOTE — PROGRESS NOTES
CC: Video Visit for Medical Management    Chief Complaint:   Chief Complaint   Patient presents with    Hypertension    Hyperlipidemia    Diabetes    Anxiety    Depression    Arthritis     Med refill / video visit        Halie Guidry 83 y.o. female who presents today for Medical Management of the below listed issues. She  has a problem list of   Patient Active Problem List   Diagnosis    Compression fracture    Lumbar degenerative disc disease    Type 2 diabetes mellitus with hyperglycemia, with long-term current use of insulin    Hyperlipidemia    Benign essential hypertension    Primary hypothyroidism    Neuropathy    Osteoporosis    Proteinuria    Tobacco abuse    Diabetic eye exam    Generalized weakness    Spinal stenosis    Scoliosis    Arthritis    VBI (vertebrobasilar insufficiency)    Orthostatic hypotension    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    Severe hypothyroidism    Noncompliance with medication regimen    Vitamin D deficiency disease    Altered mental status    Tremor    Seizure    Stage 3a chronic kidney disease    Thoracic degenerative disc disease    Metabolic encephalopathy    VIPUL (acute kidney injury)    Hyperglycemia    Type II diabetes mellitus, uncontrolled    Lower abdominal pain    Transaminitis    COVID-19 virus detected    UTI (urinary tract infection)    Emphysematous cystitis    Choledocholithiasis    Hypokalemia    Hypoxia    Generalized abdominal pain    History of Clostridium difficile infection    History of ERCP    Acute UTI (urinary tract infection)    Hypomagnesemia    Burning pain    Anxiety disorder    Neuropathic pain    Intertrigo    Weakness of both lower extremities    Accelerated hypertension    Acute cystitis without hematuria    Altered mental status, unspecified altered mental status type    Left lower lobe pneumonia    Acute pain of left foot    Cellulitis and abscess of left lower extremity    Hypertensive kidney disease with stage 3a  chronic kidney disease    Bilateral leg pain    Peripheral edema    Primary malignant neuroendocrine tumor of pancreas    Encounter for long-term (current) use of high-risk medication   .  Since the last visit, She has overall felt well.  she has not been compliant with her medications, as she's not followed up as scheduled and has run out of some meds.She's been seeing Hem/Onc for Pancreatic Neuroendocrine tumor.   Current Outpatient Medications:     amLODIPine (NORVASC) 5 MG tablet, Take 1 tablet by mouth Daily., Disp: 90 tablet, Rfl: 1    bisoprolol (ZEBeta) 5 MG tablet, Take 1 tablet by mouth Daily., Disp: 90 tablet, Rfl: 1    chlorthalidone (HYGROTON) 25 MG tablet, Take 1 tablet by mouth Daily., Disp: 90 tablet, Rfl: 1    DULoxetine (CYMBALTA) 30 MG capsule, Take 1 capsule by mouth Daily., Disp: 90 capsule, Rfl: 1    lisinopril (PRINIVIL,ZESTRIL) 40 MG tablet, Take 1 tablet by mouth Daily., Disp: 90 tablet, Rfl: 1    pregabalin (LYRICA) 50 MG capsule, Take 1 capsule by mouth 3 (Three) Times a Day., Disp: 270 capsule, Rfl: 1    atorvastatin (LIPITOR) 80 MG tablet, TAKE 1 TABLET EVERY NIGHT, Disp: 30 tablet, Rfl: 0    BD Pen Needle Naila 2nd Gen 32G X 4 MM misc, USE TO INJECT INSULIN FOUR TIMES DAILY, Disp: 200 each, Rfl: 0    dicyclomine (BENTYL) 20 MG tablet, Take 1 tablet by mouth 3 (Three) Times a Day As Needed., Disp: , Rfl:     doxazosin (CARDURA) 1 MG tablet, , Disp: , Rfl:     furosemide (LASIX) 20 MG tablet, Take 1 tablet by mouth Daily., Disp: 30 tablet, Rfl: 3    HYDROcodone-acetaminophen (NORCO)  MG per tablet, Take 1 tablet by mouth 3 times a day., Disp: , Rfl:     hydrocortisone-zinc oxide-bacitracin-nystatin cream, Apply 1 application topically to the appropriate area as directed 2 (Two) Times a Day As Needed (rash)., Disp: 5 g, Rfl: 0    Insulin Glargine, 1 Unit Dial, (TOUJEO) 300 UNIT/ML solution pen-injector injection, Inject 90 Units under the skin into the appropriate area as directed  Daily., Disp: , Rfl:     insulin lispro (HUMALOG/ADMELOG) 100 UNIT/ML injection, 20 units plus 2 units per 50 over 150 before meals.  Max daily dose of 90 units., Disp: 27 mL, Rfl: 0    lansoprazole (PREVACID) 30 MG capsule, TAKE 1 CAPSULE DAILY, Disp: 30 capsule, Rfl: 11    levothyroxine (SYNTHROID, LEVOTHROID) 112 MCG tablet, levothyroxine 112 mcg 1 tablet daily, except on Sunday take 1.5 tablets., Disp: 96 tablet, Rfl: 0    vitamin B-12 (VITAMIN B-12) 1000 MCG tablet, Take 1 tablet by mouth Daily., Disp: , Rfl: .  She denies medication side effects.    All of the other chronic condition(s) listed above are stable w/o issues.    There were no vitals taken for this visit.    Results for orders placed or performed in visit on 12/11/23   Comprehensive Metabolic Panel    Specimen: Blood   Result Value Ref Range    Glucose 284 (H) 65 - 99 mg/dL    BUN 33 (H) 8 - 23 mg/dL    Creatinine 1.30 (H) 0.57 - 1.00 mg/dL    Sodium 139 136 - 145 mmol/L    Potassium 4.3 3.5 - 5.2 mmol/L    Chloride 98 98 - 107 mmol/L    CO2 26.9 22.0 - 29.0 mmol/L    Calcium 9.2 8.6 - 10.5 mg/dL    Total Protein 8.0 6.0 - 8.5 g/dL    Albumin 4.0 3.5 - 5.2 g/dL    ALT (SGPT) 13 1 - 33 U/L    AST (SGOT) 19 1 - 32 U/L    Alkaline Phosphatase 138 (H) 39 - 117 U/L    Total Bilirubin 0.5 0.0 - 1.2 mg/dL    Globulin 4.0 gm/dL    A/G Ratio 1.0 g/dL    BUN/Creatinine Ratio 25.4 (H) 7.0 - 25.0    Anion Gap 14.1 5.0 - 15.0 mmol/L    eGFR 40.9 (L) >60.0 mL/min/1.73             The following portions of the patient's history were reviewed and updated as appropriate: allergies, current medications, past family history, past medical history, past social history, past surgical history, and problem list.    Review of Systems   Constitutional:  Negative for activity change, chills and fever.   Respiratory:  Negative for cough.    Cardiovascular:  Negative for chest pain.   Psychiatric/Behavioral:  Negative for dysphoric mood.        Objective            Physical  Exam  Constitutional:       General: She is not in acute distress.     Appearance: She is well-developed.   Pulmonary:      Effort: Pulmonary effort is normal.   Neurological:      Mental Status: She is alert and oriented to person, place, and time.   Psychiatric:         Behavior: Behavior normal.         Thought Content: Thought content normal.     Pt sees Endocrine for her DM and recent labs reviewed by me at today's visit.        Diagnoses and all orders for this visit:    1. Accelerated hypertension (Primary)  -     amLODIPine (NORVASC) 5 MG tablet; Take 1 tablet by mouth Daily.  Dispense: 90 tablet; Refill: 1  -     bisoprolol (ZEBeta) 5 MG tablet; Take 1 tablet by mouth Daily.  Dispense: 90 tablet; Refill: 1  -     chlorthalidone (HYGROTON) 25 MG tablet; Take 1 tablet by mouth Daily.  Dispense: 90 tablet; Refill: 1  -     lisinopril (PRINIVIL,ZESTRIL) 40 MG tablet; Take 1 tablet by mouth Daily.  Dispense: 90 tablet; Refill: 1    2. Neuropathic pain  -     DULoxetine (CYMBALTA) 30 MG capsule; Take 1 capsule by mouth Daily.  Dispense: 90 capsule; Refill: 1    3. Anxiety  -     DULoxetine (CYMBALTA) 30 MG capsule; Take 1 capsule by mouth Daily.  Dispense: 90 capsule; Refill: 1    4. Neuropathy  -     pregabalin (LYRICA) 50 MG capsule; Take 1 capsule by mouth 3 (Three) Times a Day.  Dispense: 270 capsule; Refill: 1        Spent   11  minutes with chart and interview and consent for this encounter given by the patient.  You have chosen to receive care through a telehealth visit.  Do you consent to use a video/audio connection for your medical care today? Yes  Patient was in their residence when this visit took place and I was in my medical office.

## 2023-12-21 ENCOUNTER — TELEPHONE (OUTPATIENT)
Dept: ENDOCRINOLOGY | Age: 83
End: 2023-12-21
Payer: MEDICARE

## 2023-12-21 DIAGNOSIS — Z79.4 TYPE 2 DIABETES MELLITUS WITH HYPERGLYCEMIA, WITH LONG-TERM CURRENT USE OF INSULIN: ICD-10-CM

## 2023-12-21 DIAGNOSIS — E11.65 TYPE 2 DIABETES MELLITUS WITH HYPERGLYCEMIA, WITH LONG-TERM CURRENT USE OF INSULIN: ICD-10-CM

## 2023-12-21 RX ORDER — INSULIN LISPRO 100 [IU]/ML
INJECTION, SOLUTION INTRAVENOUS; SUBCUTANEOUS
Qty: 27 ML | Refills: 0 | Status: ON HOLD | OUTPATIENT
Start: 2023-12-21

## 2023-12-21 NOTE — TELEPHONE ENCOUNTER
PT CALLED FOR A REFILL ON THE BELOW MED. PT IS ON HER LAST PEN      insulin lispro (HUMALOG/ADMELOG) 100 UNIT/ML injection [[id number]]         GOING TO:      [id number] - [facility] [address]  AT [facility] [phone] [facility] [phone] [facility]

## 2023-12-24 ENCOUNTER — HOSPITAL ENCOUNTER (OUTPATIENT)
Facility: HOSPITAL | Age: 83
Setting detail: OBSERVATION
LOS: 5 days | Discharge: NURSING, REHAB OR LONG-TERM CARE | End: 2023-12-30
Attending: EMERGENCY MEDICINE | Admitting: STUDENT IN AN ORGANIZED HEALTH CARE EDUCATION/TRAINING PROGRAM
Payer: MEDICARE

## 2023-12-24 ENCOUNTER — APPOINTMENT (OUTPATIENT)
Dept: GENERAL RADIOLOGY | Facility: HOSPITAL | Age: 83
End: 2023-12-24
Payer: MEDICARE

## 2023-12-24 DIAGNOSIS — E78.5 HYPERLIPIDEMIA, UNSPECIFIED HYPERLIPIDEMIA TYPE: ICD-10-CM

## 2023-12-24 DIAGNOSIS — E03.9 HYPOTHYROIDISM, UNSPECIFIED TYPE: ICD-10-CM

## 2023-12-24 DIAGNOSIS — L97.521 DIABETIC ULCER OF LEFT FOOT ASSOCIATED WITH TYPE 2 DIABETES MELLITUS, LIMITED TO BREAKDOWN OF SKIN, UNSPECIFIED PART OF FOOT: ICD-10-CM

## 2023-12-24 DIAGNOSIS — N18.9 ACUTE RENAL FAILURE SUPERIMPOSED ON CHRONIC KIDNEY DISEASE, UNSPECIFIED ACUTE RENAL FAILURE TYPE, UNSPECIFIED CKD STAGE: ICD-10-CM

## 2023-12-24 DIAGNOSIS — Z79.4 TYPE 2 DIABETES MELLITUS WITH HYPERGLYCEMIA, WITH LONG-TERM CURRENT USE OF INSULIN: ICD-10-CM

## 2023-12-24 DIAGNOSIS — L97.529 DIABETIC ULCER OF LEFT FOOT ASSOCIATED WITH TYPE 2 DIABETES MELLITUS, UNSPECIFIED PART OF FOOT, UNSPECIFIED ULCER STAGE: Primary | ICD-10-CM

## 2023-12-24 DIAGNOSIS — E11.621 DIABETIC ULCER OF LEFT FOOT ASSOCIATED WITH TYPE 2 DIABETES MELLITUS, UNSPECIFIED PART OF FOOT, UNSPECIFIED ULCER STAGE: Primary | ICD-10-CM

## 2023-12-24 DIAGNOSIS — E11.65 TYPE 2 DIABETES MELLITUS WITH HYPERGLYCEMIA, WITH LONG-TERM CURRENT USE OF INSULIN: ICD-10-CM

## 2023-12-24 DIAGNOSIS — N17.9 ACUTE RENAL FAILURE SUPERIMPOSED ON CHRONIC KIDNEY DISEASE, UNSPECIFIED ACUTE RENAL FAILURE TYPE, UNSPECIFIED CKD STAGE: ICD-10-CM

## 2023-12-24 DIAGNOSIS — E11.621 DIABETIC ULCER OF LEFT FOOT ASSOCIATED WITH TYPE 2 DIABETES MELLITUS, LIMITED TO BREAKDOWN OF SKIN, UNSPECIFIED PART OF FOOT: ICD-10-CM

## 2023-12-24 DIAGNOSIS — E11.65 TYPE 2 DIABETES MELLITUS WITH HYPERGLYCEMIA, UNSPECIFIED WHETHER LONG TERM INSULIN USE: ICD-10-CM

## 2023-12-24 DIAGNOSIS — I10 HYPERTENSION, UNSPECIFIED TYPE: ICD-10-CM

## 2023-12-24 PROBLEM — L97.509 DIABETIC FOOT ULCER: Status: ACTIVE | Noted: 2023-12-24

## 2023-12-24 LAB
ALBUMIN SERPL-MCNC: 3.6 G/DL (ref 3.5–5.2)
ALBUMIN/GLOB SERPL: 0.9 G/DL
ALP SERPL-CCNC: 114 U/L (ref 39–117)
ALT SERPL W P-5'-P-CCNC: 7 U/L (ref 1–33)
ANION GAP SERPL CALCULATED.3IONS-SCNC: 13.8 MMOL/L (ref 5–15)
AST SERPL-CCNC: 12 U/L (ref 1–32)
BASOPHILS # BLD AUTO: 0.09 10*3/MM3 (ref 0–0.2)
BASOPHILS NFR BLD AUTO: 0.8 % (ref 0–1.5)
BILIRUB SERPL-MCNC: 0.3 MG/DL (ref 0–1.2)
BUN SERPL-MCNC: 34 MG/DL (ref 8–23)
BUN/CREAT SERPL: 24.3 (ref 7–25)
CALCIUM SPEC-SCNC: 8.9 MG/DL (ref 8.6–10.5)
CHLORIDE SERPL-SCNC: 98 MMOL/L (ref 98–107)
CO2 SERPL-SCNC: 23.2 MMOL/L (ref 22–29)
CREAT SERPL-MCNC: 1.4 MG/DL (ref 0.57–1)
CRP SERPL-MCNC: 1.66 MG/DL (ref 0–0.5)
D-LACTATE SERPL-SCNC: 2.2 MMOL/L (ref 0.5–2)
D-LACTATE SERPL-SCNC: 2.6 MMOL/L (ref 0.5–2)
DEPRECATED RDW RBC AUTO: 44.9 FL (ref 37–54)
EGFRCR SERPLBLD CKD-EPI 2021: 37.4 ML/MIN/1.73
EOSINOPHIL # BLD AUTO: 0.3 10*3/MM3 (ref 0–0.4)
EOSINOPHIL NFR BLD AUTO: 2.7 % (ref 0.3–6.2)
ERYTHROCYTE [DISTWIDTH] IN BLOOD BY AUTOMATED COUNT: 14.5 % (ref 12.3–15.4)
ERYTHROCYTE [SEDIMENTATION RATE] IN BLOOD: 25 MM/HR (ref 0–30)
GLOBULIN UR ELPH-MCNC: 3.9 GM/DL
GLUCOSE SERPL-MCNC: 342 MG/DL (ref 65–99)
HCT VFR BLD AUTO: 36.9 % (ref 34–46.6)
HGB BLD-MCNC: 11.4 G/DL (ref 12–15.9)
IMM GRANULOCYTES # BLD AUTO: 0.05 10*3/MM3 (ref 0–0.05)
IMM GRANULOCYTES NFR BLD AUTO: 0.4 % (ref 0–0.5)
LYMPHOCYTES # BLD AUTO: 1.01 10*3/MM3 (ref 0.7–3.1)
LYMPHOCYTES NFR BLD AUTO: 9.1 % (ref 19.6–45.3)
MCH RBC QN AUTO: 26.1 PG (ref 26.6–33)
MCHC RBC AUTO-ENTMCNC: 30.9 G/DL (ref 31.5–35.7)
MCV RBC AUTO: 84.6 FL (ref 79–97)
MONOCYTES # BLD AUTO: 0.88 10*3/MM3 (ref 0.1–0.9)
MONOCYTES NFR BLD AUTO: 7.9 % (ref 5–12)
NEUTROPHILS NFR BLD AUTO: 79.1 % (ref 42.7–76)
NEUTROPHILS NFR BLD AUTO: 8.81 10*3/MM3 (ref 1.7–7)
NRBC BLD AUTO-RTO: 0 /100 WBC (ref 0–0.2)
PLATELET # BLD AUTO: 360 10*3/MM3 (ref 140–450)
PMV BLD AUTO: 9.4 FL (ref 6–12)
POTASSIUM SERPL-SCNC: 3.9 MMOL/L (ref 3.5–5.2)
PROCALCITONIN SERPL-MCNC: 0.07 NG/ML (ref 0–0.25)
PROT SERPL-MCNC: 7.5 G/DL (ref 6–8.5)
RBC # BLD AUTO: 4.36 10*6/MM3 (ref 3.77–5.28)
SODIUM SERPL-SCNC: 135 MMOL/L (ref 136–145)
WBC NRBC COR # BLD AUTO: 11.14 10*3/MM3 (ref 3.4–10.8)

## 2023-12-24 PROCEDURE — 80053 COMPREHEN METABOLIC PANEL: CPT | Performed by: PHYSICIAN ASSISTANT

## 2023-12-24 PROCEDURE — 87077 CULTURE AEROBIC IDENTIFY: CPT | Performed by: PHYSICIAN ASSISTANT

## 2023-12-24 PROCEDURE — 63710000001 INSULIN REGULAR HUMAN PER 5 UNITS: Performed by: PHYSICIAN ASSISTANT

## 2023-12-24 PROCEDURE — 96365 THER/PROPH/DIAG IV INF INIT: CPT

## 2023-12-24 PROCEDURE — 87147 CULTURE TYPE IMMUNOLOGIC: CPT | Performed by: PHYSICIAN ASSISTANT

## 2023-12-24 PROCEDURE — 99284 EMERGENCY DEPT VISIT MOD MDM: CPT

## 2023-12-24 PROCEDURE — 87205 SMEAR GRAM STAIN: CPT | Performed by: PHYSICIAN ASSISTANT

## 2023-12-24 PROCEDURE — 86140 C-REACTIVE PROTEIN: CPT | Performed by: PHYSICIAN ASSISTANT

## 2023-12-24 PROCEDURE — 85025 COMPLETE CBC W/AUTO DIFF WBC: CPT | Performed by: PHYSICIAN ASSISTANT

## 2023-12-24 PROCEDURE — 85652 RBC SED RATE AUTOMATED: CPT | Performed by: PHYSICIAN ASSISTANT

## 2023-12-24 PROCEDURE — 87040 BLOOD CULTURE FOR BACTERIA: CPT | Performed by: EMERGENCY MEDICINE

## 2023-12-24 PROCEDURE — 83605 ASSAY OF LACTIC ACID: CPT | Performed by: EMERGENCY MEDICINE

## 2023-12-24 PROCEDURE — 84145 PROCALCITONIN (PCT): CPT | Performed by: EMERGENCY MEDICINE

## 2023-12-24 PROCEDURE — 36415 COLL VENOUS BLD VENIPUNCTURE: CPT

## 2023-12-24 PROCEDURE — 87070 CULTURE OTHR SPECIMN AEROBIC: CPT | Performed by: PHYSICIAN ASSISTANT

## 2023-12-24 PROCEDURE — 25810000003 SODIUM CHLORIDE 0.9 % SOLUTION: Performed by: PHYSICIAN ASSISTANT

## 2023-12-24 PROCEDURE — 83036 HEMOGLOBIN GLYCOSYLATED A1C: CPT | Performed by: NURSE PRACTITIONER

## 2023-12-24 PROCEDURE — 25010000002 CEFEPIME PER 500 MG: Performed by: PHYSICIAN ASSISTANT

## 2023-12-24 PROCEDURE — 87186 SC STD MICRODIL/AGAR DIL: CPT | Performed by: PHYSICIAN ASSISTANT

## 2023-12-24 PROCEDURE — 73630 X-RAY EXAM OF FOOT: CPT

## 2023-12-24 PROCEDURE — 25010000002 VANCOMYCIN 10 G RECONSTITUTED SOLUTION: Performed by: PHYSICIAN ASSISTANT

## 2023-12-24 RX ORDER — ACETAMINOPHEN 160 MG/5ML
650 SOLUTION ORAL EVERY 4 HOURS PRN
Status: DISCONTINUED | OUTPATIENT
Start: 2023-12-24 | End: 2023-12-30 | Stop reason: HOSPADM

## 2023-12-24 RX ORDER — SODIUM CHLORIDE 0.9 % (FLUSH) 0.9 %
10 SYRINGE (ML) INJECTION AS NEEDED
Status: DISCONTINUED | OUTPATIENT
Start: 2023-12-24 | End: 2023-12-30 | Stop reason: HOSPADM

## 2023-12-24 RX ORDER — CALCIUM CARBONATE 500 MG/1
2 TABLET, CHEWABLE ORAL 2 TIMES DAILY PRN
Status: DISCONTINUED | OUTPATIENT
Start: 2023-12-24 | End: 2023-12-30 | Stop reason: HOSPADM

## 2023-12-24 RX ORDER — HYDROCODONE BITARTRATE AND ACETAMINOPHEN 5; 325 MG/1; MG/1
1 TABLET ORAL ONCE
Status: COMPLETED | OUTPATIENT
Start: 2023-12-24 | End: 2023-12-25

## 2023-12-24 RX ORDER — SODIUM CHLORIDE 0.9 % (FLUSH) 0.9 %
10 SYRINGE (ML) INJECTION EVERY 12 HOURS SCHEDULED
Status: DISCONTINUED | OUTPATIENT
Start: 2023-12-24 | End: 2023-12-30 | Stop reason: HOSPADM

## 2023-12-24 RX ORDER — ONDANSETRON 2 MG/ML
4 INJECTION INTRAMUSCULAR; INTRAVENOUS EVERY 6 HOURS PRN
Status: DISCONTINUED | OUTPATIENT
Start: 2023-12-24 | End: 2023-12-30 | Stop reason: HOSPADM

## 2023-12-24 RX ORDER — ONDANSETRON 4 MG/1
4 TABLET, ORALLY DISINTEGRATING ORAL EVERY 6 HOURS PRN
Status: DISCONTINUED | OUTPATIENT
Start: 2023-12-24 | End: 2023-12-30 | Stop reason: HOSPADM

## 2023-12-24 RX ORDER — ACETAMINOPHEN 650 MG/1
650 SUPPOSITORY RECTAL EVERY 4 HOURS PRN
Status: DISCONTINUED | OUTPATIENT
Start: 2023-12-24 | End: 2023-12-30 | Stop reason: HOSPADM

## 2023-12-24 RX ORDER — SODIUM CHLORIDE 9 MG/ML
75 INJECTION, SOLUTION INTRAVENOUS CONTINUOUS
Status: DISCONTINUED | OUTPATIENT
Start: 2023-12-24 | End: 2023-12-29

## 2023-12-24 RX ORDER — SODIUM CHLORIDE 9 MG/ML
40 INJECTION, SOLUTION INTRAVENOUS AS NEEDED
Status: DISCONTINUED | OUTPATIENT
Start: 2023-12-24 | End: 2023-12-30 | Stop reason: HOSPADM

## 2023-12-24 RX ORDER — ACETAMINOPHEN 325 MG/1
650 TABLET ORAL EVERY 4 HOURS PRN
Status: DISCONTINUED | OUTPATIENT
Start: 2023-12-24 | End: 2023-12-30 | Stop reason: HOSPADM

## 2023-12-24 RX ADMIN — INSULIN HUMAN 10 UNITS: 100 INJECTION, SOLUTION PARENTERAL at 22:07

## 2023-12-24 RX ADMIN — VANCOMYCIN HYDROCHLORIDE 1750 MG: 10 INJECTION, POWDER, LYOPHILIZED, FOR SOLUTION INTRAVENOUS at 22:09

## 2023-12-24 RX ADMIN — Medication 10 ML: at 23:59

## 2023-12-24 RX ADMIN — CEFEPIME 2000 MG: 2 INJECTION, POWDER, FOR SOLUTION INTRAVENOUS at 20:50

## 2023-12-24 RX ADMIN — SODIUM CHLORIDE 1000 ML: 9 INJECTION, SOLUTION INTRAVENOUS at 22:08

## 2023-12-25 ENCOUNTER — APPOINTMENT (OUTPATIENT)
Dept: MRI IMAGING | Facility: HOSPITAL | Age: 83
End: 2023-12-25
Payer: MEDICARE

## 2023-12-25 LAB
BACTERIA UR QL AUTO: ABNORMAL /HPF
BILIRUB UR QL STRIP: NEGATIVE
CLARITY UR: CLEAR
COLOR UR: YELLOW
CREAT SERPL-MCNC: 1.26 MG/DL (ref 0.57–1)
D-LACTATE SERPL-SCNC: 1.8 MMOL/L (ref 0.5–2)
EGFRCR SERPLBLD CKD-EPI 2021: 42.4 ML/MIN/1.73
GLUCOSE BLDC GLUCOMTR-MCNC: 277 MG/DL (ref 70–130)
GLUCOSE BLDC GLUCOMTR-MCNC: 283 MG/DL (ref 70–130)
GLUCOSE BLDC GLUCOMTR-MCNC: 323 MG/DL (ref 70–130)
GLUCOSE BLDC GLUCOMTR-MCNC: 338 MG/DL (ref 70–130)
GLUCOSE UR STRIP-MCNC: ABNORMAL MG/DL
HBA1C MFR BLD: 8.2 % (ref 4.8–5.6)
HGB UR QL STRIP.AUTO: NEGATIVE
HYALINE CASTS UR QL AUTO: ABNORMAL /LPF
KETONES UR QL STRIP: NEGATIVE
LEUKOCYTE ESTERASE UR QL STRIP.AUTO: ABNORMAL
NITRITE UR QL STRIP: NEGATIVE
PH UR STRIP.AUTO: 7 [PH] (ref 5–8)
PROT UR QL STRIP: ABNORMAL
RBC # UR STRIP: ABNORMAL /HPF
REF LAB TEST METHOD: ABNORMAL
SP GR UR STRIP: 1.01 (ref 1–1.03)
SQUAMOUS #/AREA URNS HPF: ABNORMAL /HPF
UROBILINOGEN UR QL STRIP: ABNORMAL
WBC # UR STRIP: ABNORMAL /HPF

## 2023-12-25 PROCEDURE — 82948 REAGENT STRIP/BLOOD GLUCOSE: CPT

## 2023-12-25 PROCEDURE — 83605 ASSAY OF LACTIC ACID: CPT | Performed by: EMERGENCY MEDICINE

## 2023-12-25 PROCEDURE — 36415 COLL VENOUS BLD VENIPUNCTURE: CPT | Performed by: EMERGENCY MEDICINE

## 2023-12-25 PROCEDURE — 96361 HYDRATE IV INFUSION ADD-ON: CPT

## 2023-12-25 PROCEDURE — 25810000003 SODIUM CHLORIDE 0.9 % SOLUTION 250 ML FLEX CONT: Performed by: NURSE PRACTITIONER

## 2023-12-25 PROCEDURE — 73718 MRI LOWER EXTREMITY W/O DYE: CPT

## 2023-12-25 PROCEDURE — 96375 TX/PRO/DX INJ NEW DRUG ADDON: CPT

## 2023-12-25 PROCEDURE — 25010000002 CEFEPIME PER 500 MG: Performed by: NURSE PRACTITIONER

## 2023-12-25 PROCEDURE — 87086 URINE CULTURE/COLONY COUNT: CPT | Performed by: STUDENT IN AN ORGANIZED HEALTH CARE EDUCATION/TRAINING PROGRAM

## 2023-12-25 PROCEDURE — 63710000001 INSULIN LISPRO (HUMAN) PER 5 UNITS: Performed by: STUDENT IN AN ORGANIZED HEALTH CARE EDUCATION/TRAINING PROGRAM

## 2023-12-25 PROCEDURE — 25010000002 MORPHINE PER 10 MG: Performed by: NURSE PRACTITIONER

## 2023-12-25 PROCEDURE — 25810000003 SODIUM CHLORIDE 0.9 % SOLUTION: Performed by: NURSE PRACTITIONER

## 2023-12-25 PROCEDURE — 25010000002 VANCOMYCIN 750 MG RECONSTITUTED SOLUTION 1 EACH VIAL: Performed by: NURSE PRACTITIONER

## 2023-12-25 PROCEDURE — 81001 URINALYSIS AUTO W/SCOPE: CPT | Performed by: STUDENT IN AN ORGANIZED HEALTH CARE EDUCATION/TRAINING PROGRAM

## 2023-12-25 PROCEDURE — 63710000001 INSULIN GLARGINE PER 5 UNITS: Performed by: NURSE PRACTITIONER

## 2023-12-25 PROCEDURE — 82565 ASSAY OF CREATININE: CPT | Performed by: NURSE PRACTITIONER

## 2023-12-25 RX ORDER — TERAZOSIN 1 MG/1
1 CAPSULE ORAL NIGHTLY
Status: DISCONTINUED | OUTPATIENT
Start: 2023-12-25 | End: 2023-12-30 | Stop reason: HOSPADM

## 2023-12-25 RX ORDER — IBUPROFEN 600 MG/1
1 TABLET ORAL
Status: DISCONTINUED | OUTPATIENT
Start: 2023-12-25 | End: 2023-12-30 | Stop reason: HOSPADM

## 2023-12-25 RX ORDER — MORPHINE SULFATE 2 MG/ML
2 INJECTION, SOLUTION INTRAMUSCULAR; INTRAVENOUS EVERY 4 HOURS PRN
Status: DISCONTINUED | OUTPATIENT
Start: 2023-12-25 | End: 2023-12-30

## 2023-12-25 RX ORDER — ATORVASTATIN CALCIUM 20 MG/1
80 TABLET, FILM COATED ORAL NIGHTLY
Status: DISCONTINUED | OUTPATIENT
Start: 2023-12-25 | End: 2023-12-30 | Stop reason: HOSPADM

## 2023-12-25 RX ORDER — DULOXETIN HYDROCHLORIDE 30 MG/1
30 CAPSULE, DELAYED RELEASE ORAL DAILY
Status: DISCONTINUED | OUTPATIENT
Start: 2023-12-25 | End: 2023-12-30 | Stop reason: HOSPADM

## 2023-12-25 RX ORDER — DICYCLOMINE HYDROCHLORIDE 10 MG/1
20 CAPSULE ORAL 3 TIMES DAILY PRN
Status: DISCONTINUED | OUTPATIENT
Start: 2023-12-25 | End: 2023-12-30 | Stop reason: HOSPADM

## 2023-12-25 RX ORDER — PANTOPRAZOLE SODIUM 40 MG/1
40 TABLET, DELAYED RELEASE ORAL
Status: DISCONTINUED | OUTPATIENT
Start: 2023-12-25 | End: 2023-12-30 | Stop reason: HOSPADM

## 2023-12-25 RX ORDER — LISINOPRIL 40 MG/1
40 TABLET ORAL DAILY
Status: DISCONTINUED | OUTPATIENT
Start: 2023-12-26 | End: 2023-12-30 | Stop reason: HOSPADM

## 2023-12-25 RX ORDER — BISOPROLOL FUMARATE 5 MG/1
5 TABLET, FILM COATED ORAL DAILY
Status: DISCONTINUED | OUTPATIENT
Start: 2023-12-25 | End: 2023-12-30 | Stop reason: HOSPADM

## 2023-12-25 RX ORDER — AMLODIPINE BESYLATE 5 MG/1
5 TABLET ORAL DAILY
Status: DISCONTINUED | OUTPATIENT
Start: 2023-12-25 | End: 2023-12-28

## 2023-12-25 RX ORDER — INSULIN LISPRO 100 [IU]/ML
2-7 INJECTION, SOLUTION INTRAVENOUS; SUBCUTANEOUS
Status: DISCONTINUED | OUTPATIENT
Start: 2023-12-25 | End: 2023-12-30 | Stop reason: HOSPADM

## 2023-12-25 RX ORDER — NICOTINE POLACRILEX 4 MG
15 LOZENGE BUCCAL
Status: DISCONTINUED | OUTPATIENT
Start: 2023-12-25 | End: 2023-12-30 | Stop reason: HOSPADM

## 2023-12-25 RX ORDER — DEXTROSE MONOHYDRATE 25 G/50ML
25 INJECTION, SOLUTION INTRAVENOUS
Status: DISCONTINUED | OUTPATIENT
Start: 2023-12-25 | End: 2023-12-30 | Stop reason: HOSPADM

## 2023-12-25 RX ORDER — CHLORTHALIDONE 25 MG/1
25 TABLET ORAL DAILY
Status: DISCONTINUED | OUTPATIENT
Start: 2023-12-26 | End: 2023-12-30 | Stop reason: HOSPADM

## 2023-12-25 RX ORDER — HYDROCODONE BITARTRATE AND ACETAMINOPHEN 10; 325 MG/1; MG/1
1 TABLET ORAL 3 TIMES DAILY
Status: DISCONTINUED | OUTPATIENT
Start: 2023-12-25 | End: 2023-12-30 | Stop reason: HOSPADM

## 2023-12-25 RX ORDER — LORAZEPAM 1 MG/1
1 TABLET ORAL ONCE AS NEEDED
Status: COMPLETED | OUTPATIENT
Start: 2023-12-25 | End: 2023-12-25

## 2023-12-25 RX ORDER — FUROSEMIDE 20 MG/1
20 TABLET ORAL DAILY
Status: DISCONTINUED | OUTPATIENT
Start: 2023-12-26 | End: 2023-12-30 | Stop reason: HOSPADM

## 2023-12-25 RX ORDER — PREGABALIN 50 MG/1
50 CAPSULE ORAL 3 TIMES DAILY
Status: DISCONTINUED | OUTPATIENT
Start: 2023-12-25 | End: 2023-12-30 | Stop reason: HOSPADM

## 2023-12-25 RX ORDER — LEVOTHYROXINE SODIUM 112 UG/1
112 TABLET ORAL
Status: DISCONTINUED | OUTPATIENT
Start: 2023-12-25 | End: 2023-12-30 | Stop reason: HOSPADM

## 2023-12-25 RX ADMIN — LORAZEPAM 1 MG: 1 TABLET ORAL at 07:40

## 2023-12-25 RX ADMIN — SODIUM CHLORIDE 75 ML/HR: 9 INJECTION, SOLUTION INTRAVENOUS at 00:19

## 2023-12-25 RX ADMIN — LEVOTHYROXINE SODIUM 112 MCG: 112 TABLET ORAL at 09:21

## 2023-12-25 RX ADMIN — DULOXETINE HYDROCHLORIDE 30 MG: 30 CAPSULE, DELAYED RELEASE ORAL at 09:21

## 2023-12-25 RX ADMIN — ATORVASTATIN CALCIUM 80 MG: 20 TABLET, FILM COATED ORAL at 20:56

## 2023-12-25 RX ADMIN — PREGABALIN 50 MG: 50 CAPSULE ORAL at 16:40

## 2023-12-25 RX ADMIN — INSULIN LISPRO 5 UNITS: 100 INJECTION, SOLUTION INTRAVENOUS; SUBCUTANEOUS at 12:20

## 2023-12-25 RX ADMIN — BISOPROLOL FUMARATE 5 MG: 5 TABLET, FILM COATED ORAL at 09:22

## 2023-12-25 RX ADMIN — HYDROCODONE BITARTRATE AND ACETAMINOPHEN 1 TABLET: 10; 325 TABLET ORAL at 20:56

## 2023-12-25 RX ADMIN — HYDROCODONE BITARTRATE AND ACETAMINOPHEN 1 TABLET: 10; 325 TABLET ORAL at 09:20

## 2023-12-25 RX ADMIN — VANCOMYCIN HYDROCHLORIDE 750 MG: 750 INJECTION, POWDER, LYOPHILIZED, FOR SOLUTION INTRAVENOUS at 11:23

## 2023-12-25 RX ADMIN — MORPHINE SULFATE 2 MG: 2 INJECTION, SOLUTION INTRAMUSCULAR; INTRAVENOUS at 05:55

## 2023-12-25 RX ADMIN — PANTOPRAZOLE SODIUM 40 MG: 40 TABLET, DELAYED RELEASE ORAL at 17:43

## 2023-12-25 RX ADMIN — INSULIN LISPRO 4 UNITS: 100 INJECTION, SOLUTION INTRAVENOUS; SUBCUTANEOUS at 22:17

## 2023-12-25 RX ADMIN — INSULIN LISPRO 5 UNITS: 100 INJECTION, SOLUTION INTRAVENOUS; SUBCUTANEOUS at 17:43

## 2023-12-25 RX ADMIN — CEFEPIME HYDROCHLORIDE 1000 MG: 1 INJECTION, POWDER, FOR SOLUTION INTRAMUSCULAR; INTRAVENOUS at 09:22

## 2023-12-25 RX ADMIN — PREGABALIN 50 MG: 50 CAPSULE ORAL at 20:56

## 2023-12-25 RX ADMIN — PREGABALIN 50 MG: 50 CAPSULE ORAL at 09:21

## 2023-12-25 RX ADMIN — AMLODIPINE BESYLATE 5 MG: 5 TABLET ORAL at 09:22

## 2023-12-25 RX ADMIN — Medication 10 ML: at 09:22

## 2023-12-25 RX ADMIN — CEFEPIME HYDROCHLORIDE 1000 MG: 1 INJECTION, POWDER, FOR SOLUTION INTRAMUSCULAR; INTRAVENOUS at 20:56

## 2023-12-25 RX ADMIN — SODIUM CHLORIDE 75 ML/HR: 9 INJECTION, SOLUTION INTRAVENOUS at 03:55

## 2023-12-25 RX ADMIN — PANTOPRAZOLE SODIUM 40 MG: 40 TABLET, DELAYED RELEASE ORAL at 05:56

## 2023-12-25 RX ADMIN — Medication 10 ML: at 20:04

## 2023-12-25 RX ADMIN — INSULIN GLARGINE 25 UNITS: 100 INJECTION, SOLUTION SUBCUTANEOUS at 09:38

## 2023-12-25 RX ADMIN — HYDROCODONE BITARTRATE AND ACETAMINOPHEN 1 TABLET: 10; 325 TABLET ORAL at 14:58

## 2023-12-25 RX ADMIN — TERAZOSIN HYDROCHLORIDE 1 MG: 1 CAPSULE ORAL at 20:56

## 2023-12-25 RX ADMIN — SODIUM CHLORIDE 500 ML: 9 INJECTION, SOLUTION INTRAVENOUS at 01:47

## 2023-12-25 RX ADMIN — HYDROCODONE BITARTRATE AND ACETAMINOPHEN 1 TABLET: 5; 325 TABLET ORAL at 00:18

## 2023-12-25 NOTE — H&P
Patient Name:  Halie Guidry  YOB: 1940  MRN:  [id number]  Admit Date:  12/24/2023  Patient Care Team:  Napoleon Mead MD as PCP - General  Napoleon Mead MD as PCP - Family Medicine  Lilia Orona APRN as Nurse Practitioner (Nurse Practitioner)  Beena Laguerre APRN as Referring Physician (Gastroenterology)  Napoleon Gutierrez MD as Consulting Physician (Hematology and Oncology)  Stefan Gutierrez MD as Consulting Physician (Pain Medicine)      Subjective   History Present Illness     Chief Complaint   Patient presents with   • Foot Pain       Ms. Guidry is a 83 y.o. former smoker with a history of hypertension, anxiety, hypothyroidism, hyperlipidemia, neuroendocrine tumor of pancreas, CKD stage 3a, and type 2 diabetes mellitus that presents to [facility] complaining of foot pain.  She reports progressively worsening left foot pain for the past 2 to 3 days.  She states she has a foot wound that has become increasingly red and painful.  She reports she was followed by wound care in the past for this wound and she has been applying dressings daily.  She states the pain has now affected her ability to walk and she states she cannot bear weight.  She states one of her family members sent a picture of her wound to a nursing friend and the friend suggested she come to the ED for further evaluation.  Work up in the ED revealed creatinine 1.40, BUN 34, CRP 1.66, lactate 2.2 and repeat 2.6, and hgb A1c 8.20.  An x-ray showed no definite cortical breakdown is seen to suggest osteomyelitis.  Per the ED provider, the wound had some purulent drainage in the ED.  The patient received Vancomycin and Cefepime and is being admitted for further evaluation.      History of Present Illness  Review of Systems   Constitutional:  Negative for chills and fever.   HENT:  Negative for congestion and sore throat.    Eyes:  Negative for photophobia and visual disturbance.   Respiratory:   Negative for cough, shortness of breath and wheezing.    Cardiovascular:  Negative for chest pain, palpitations and leg swelling.   Gastrointestinal:  Negative for abdominal pain, nausea and vomiting.   Endocrine: Negative for polydipsia, polyphagia and polyuria.   Musculoskeletal:  Positive for arthralgias, gait problem and myalgias.   Skin:  Positive for color change and wound.   Neurological:  Negative for dizziness, light-headedness and headaches.        Personal History     Past Medical History:   Diagnosis Date   • Acute metabolic encephalopathy 6/24/2022   • Anxiety    • Arthritis    • Benign essential hypertension 08/20/2014   • Bleeding disorder    • Depression    • Diabetes    • Diabetes mellitus, type 2    • Disc degeneration, lumbar    • Headache, tension-type    • Hyperlipidemia    • Hypothyroidism    • Neuropathy    • Osteoporosis 09/09/2015   • Peripheral neuropathy    • Rotator cuff tear, left    • Scoliosis    • Shoulder pain     LEFT, TORN ROTATOR CUFF S/P FALL   • Spinal stenosis      Past Surgical History:   Procedure Laterality Date   • APPENDECTOMY     • BILATERAL BREAST REDUCTION Bilateral 08/2015   • CATARACT EXTRACTION  03/2015   • CHOLECYSTECTOMY WITH INTRAOPERATIVE CHOLANGIOGRAM N/A 3/27/2022    Procedure: CHOLECYSTECTOMY LAPAROSCOPIC INTRAOPERATIVE CHOLANGIOGRAM;  Surgeon: Aiyana Hill MD;  Location: [facility] OR;  Service: General;  Laterality: N/A;   • COLONOSCOPY  06/05/2015    WNL   • ERCP N/A 2/25/2022    Procedure: ENDOSCOPIC RETROGRADE CHOLANGIOPANCREATOGRAPHY with sphincterotomy and balloon sweep;  Surgeon: Chilo Wilhelm MD;  Location: [facility] ENDOSCOPY;  Service: Gastroenterology;  Laterality: N/A;  PRE/POST - CBD stones   • ERCP N/A 3/28/2022    Procedure: ENDOSCOPIC RETROGRADE CHOLANGIOPANCREATOGRAPHY WITH SPHINCTEROTOMY AND BALLOON SWEEP;  Surgeon: Chilo Wilhelm MD;  Location: [facility] ENDOSCOPY;  Service: Gastroenterology;  Laterality: N/A;  PRE: COMMON DUCT  STONE  POST: COMMON DUCT STONE   • KYPHOPLASTY     • REDUCTION MAMMAPLASTY     • TONSILLECTOMY       Family History   Problem Relation Age of Onset   • Bone cancer Mother    • No Known Problems Father    • Heart disease Daughter      Social History     Tobacco Use   • Smoking status: Former     Packs/day: 1.00     Years: 40.00     Additional pack years: 0.00     Total pack years: 40.00     Types: Cigarettes     Quit date: 2013     Years since quitting: 10.9   • Smokeless tobacco: Never   Vaping Use   • Vaping Use: Never used   Substance Use Topics   • Alcohol use: No   • Drug use: No     Medications Prior to Admission   Medication Sig Dispense Refill Last Dose   • amLODIPine (NORVASC) 5 MG tablet Take 1 tablet by mouth Daily. 90 tablet 1 12/25/2023   • atorvastatin (LIPITOR) 80 MG tablet TAKE 1 TABLET EVERY NIGHT 30 tablet 0 12/24/2023   • bisoprolol (ZEBeta) 5 MG tablet Take 1 tablet by mouth Daily. 90 tablet 1 12/25/2023   • chlorthalidone (HYGROTON) 25 MG tablet Take 1 tablet by mouth Daily. 90 tablet 1 12/25/2023   • dicyclomine (BENTYL) 20 MG tablet Take 1 tablet by mouth 3 (Three) Times a Day As Needed.   12/25/2023   • doxazosin (CARDURA) 1 MG tablet    12/25/2023   • DULoxetine (CYMBALTA) 30 MG capsule Take 1 capsule by mouth Daily. 90 capsule 1 12/25/2023   • furosemide (LASIX) 20 MG tablet Take 1 tablet by mouth Daily. 30 tablet 3 12/24/2023   • HYDROcodone-acetaminophen (NORCO)  MG per tablet Take 1 tablet by mouth 3 times a day.   12/24/2023   • Insulin Glargine, 1 Unit Dial, (TOUJEO) 300 UNIT/ML solution pen-injector injection Inject 90 Units under the skin into the appropriate area as directed Daily.   12/24/2023   • lisinopril (PRINIVIL,ZESTRIL) 40 MG tablet Take 1 tablet by mouth Daily. 90 tablet 1 12/24/2023   • pregabalin (LYRICA) 50 MG capsule Take 1 capsule by mouth 3 (Three) Times a Day. 270 capsule 1 12/25/2023   • vitamin B-12 (VITAMIN B-12) 1000 MCG tablet Take 1 tablet by mouth Daily.    12/25/2023   • BD Pen Needle Naila 2nd Gen 32G X 4 MM misc USE TO INJECT INSULIN FOUR TIMES DAILY 200 each 0    • hydrocortisone-zinc oxide-bacitracin-nystatin cream Apply 1 application topically to the appropriate area as directed 2 (Two) Times a Day As Needed (rash). 5 g 0    • insulin lispro (HUMALOG/ADMELOG) 100 UNIT/ML injection 20 units plus 2 units per 50 over 150 before meals  Max daily dose of 90 units. 27 mL 0    • lansoprazole (PREVACID) 30 MG capsule TAKE 1 CAPSULE DAILY 30 capsule 11    • levothyroxine (SYNTHROID, LEVOTHROID) 112 MCG tablet levothyroxine 112 mcg 1 tablet daily, except on Sunday take 1.5 tablets. 96 tablet 0      Allergies:    Allergies   Allergen Reactions   • Sulfa Antibiotics Unknown - High Severity     Pt says it was a long time ago and I don't remember but it wasn't good.        Objective    Objective     Vital Signs  Temp:  [96.7 °F (35.9 °C)-97.5 °F (36.4 °C)] 97.5 °F (36.4 °C)  Heart Rate:  [73-75] 75  Resp:  [18-20] 20  BP: (146-189)/(60-79) 167/60  SpO2:  [97 %] 97 %  on   ;   Device (Oxygen Therapy): room air  Body mass index is 31.88 kg/m².    Physical Exam  Vitals and nursing note reviewed.   Constitutional:       Appearance: Normal appearance.   HENT:      Head: Normocephalic and atraumatic.      Nose: Nose normal.      Mouth/Throat:      Mouth: Mucous membranes are moist.      Pharynx: Oropharynx is clear.   Eyes:      Extraocular Movements: Extraocular movements intact.      Conjunctiva/sclera: Conjunctivae normal.   Cardiovascular:      Rate and Rhythm: Normal rate and regular rhythm.      Pulses: Normal pulses.      Heart sounds: Normal heart sounds.   Pulmonary:      Effort: Pulmonary effort is normal.      Breath sounds: Normal breath sounds.   Abdominal:      General: Bowel sounds are normal.      Palpations: Abdomen is soft.   Musculoskeletal:         General: Normal range of motion.      Cervical back: Normal range of motion and neck supple.      Left lower leg:  Edema (mild swelling of LLE that extends from the mid shin down to the toes) present.   Skin:     General: Skin is warm.      Findings: Erythema present.      Comments: Erythema that extends from mid shin down to the toes of the left foot. There is ulceration to the plantar side of the first MTP joint of the left foot.   Neurological:      General: No focal deficit present.      Mental Status: She is alert and oriented to person, place, and time.   Psychiatric:         Mood and Affect: Mood normal.         Behavior: Behavior normal.         Results Review:  I reviewed the patient's new clinical results.  I reviewed the patient's new imaging results and agree with the interpretation.  I reviewed the patient's other test results and agree with the interpretation  I personally viewed and interpreted the patient's EKG/Telemetry data  Discussed with ED provider.    Lab Results (last 24 hours)       Procedure Component Value Units Date/Time    Blood Culture - Blood, Arm, Right [[id number]] Collected: 12/24/23 2007    Specimen: Blood from Arm, Right Updated: 12/24/23 2013    Wound Culture - Wound, Foot, Left [[id number]] Collected: 12/24/23 2025    Specimen: Wound from Foot, Left Updated: 12/24/23 2216     Gram Stain Moderate (3+) WBCs seen      Moderate (3+) Gram positive cocci in pairs and chains      Rare (1+) Gram negative bacilli    CBC & Differential [[id number]]  (Abnormal) Collected: 12/24/23 2026    Specimen: Blood Updated: 12/24/23 2036    Narrative:      The following orders were created for panel order CBC & Differential.  Procedure                               Abnormality         Status                     ---------                               -----------         ------                     CBC Auto Differential[[id number]]        Abnormal            Final result                 Please view results for these tests on the individual orders.    Comprehensive Metabolic Panel [[id number]]  (Abnormal) Collected:  12/24/23 2026    Specimen: Blood Updated: 12/24/23 2053     Glucose 342 mg/dL      BUN 34 mg/dL      Creatinine 1.40 mg/dL      Sodium 135 mmol/L      Potassium 3.9 mmol/L      Chloride 98 mmol/L      CO2 23.2 mmol/L      Calcium 8.9 mg/dL      Total Protein 7.5 g/dL      Albumin 3.6 g/dL      ALT (SGPT) 7 U/L      AST (SGOT) 12 U/L      Alkaline Phosphatase 114 U/L      Total Bilirubin 0.3 mg/dL      Globulin 3.9 gm/dL      A/G Ratio 0.9 g/dL      BUN/Creatinine Ratio 24.3     Anion Gap 13.8 mmol/L      eGFR 37.4 mL/min/1.73     Narrative:      GFR Normal >60  Chronic Kidney Disease <60  Kidney Failure <15    The GFR formula is only valid for adults with stable renal function between ages 18 and 70.    C-reactive Protein [[id number]]  (Abnormal) Collected: 12/24/23 2026    Specimen: Blood Updated: 12/24/23 2053     C-Reactive Protein 1.66 mg/dL     Sedimentation Rate [[id number]]  (Normal) Collected: 12/24/23 2026    Specimen: Blood Updated: 12/24/23 2042     Sed Rate 25 mm/hr     CBC Auto Differential [[id number]]  (Abnormal) Collected: 12/24/23 2026    Specimen: Blood Updated: 12/24/23 2036     WBC 11.14 10*3/mm3      RBC 4.36 10*6/mm3      Hemoglobin 11.4 g/dL      Hematocrit 36.9 %      MCV 84.6 fL      MCH 26.1 pg      MCHC 30.9 g/dL      RDW 14.5 %      RDW-SD 44.9 fl      MPV 9.4 fL      Platelets 360 10*3/mm3      Neutrophil % 79.1 %      Lymphocyte % 9.1 %      Monocyte % 7.9 %      Eosinophil % 2.7 %      Basophil % 0.8 %      Immature Grans % 0.4 %      Neutrophils, Absolute 8.81 10*3/mm3      Lymphocytes, Absolute 1.01 10*3/mm3      Monocytes, Absolute 0.88 10*3/mm3      Eosinophils, Absolute 0.30 10*3/mm3      Basophils, Absolute 0.09 10*3/mm3      Immature Grans, Absolute 0.05 10*3/mm3      nRBC 0.0 /100 WBC     Blood Culture - Blood, Arm, Left [[id number]] Collected: 12/24/23 2026    Specimen: Blood from Arm, Left Updated: 12/24/23 2029    Lactic Acid, Plasma [[id number]]  (Abnormal) Collected: 12/24/23  "2026    Specimen: Blood Updated: 12/24/23 2054     Lactate 2.2 mmol/L     Procalcitonin [[id number]]  (Normal) Collected: 12/24/23 2026    Specimen: Blood Updated: 12/24/23 2100     Procalcitonin 0.07 ng/mL     Narrative:      As a Marker for Sepsis (Non-Neonates):    1. <0.5 ng/mL represents a low risk of severe sepsis and/or septic shock.  2. >2 ng/mL represents a high risk of severe sepsis and/or septic shock.    As a Marker for Lower Respiratory Tract Infections that require antibiotic therapy:    PCT on Admission    Antibiotic Therapy       6-12 Hrs later    >0.5                Strongly Recommended  >0.25 - <0.5        Recommended   0.1 - 0.25          Discouraged              Remeasure/reassess PCT  <0.1                Strongly Discouraged     Remeasure/reassess PCT    As 28 day mortality risk marker: \"Change in Procalcitonin Result\" (>80% or <=80%) if Day 0 (or Day 1) and Day 4 values are available. Refer to http://www.[employer]-pct-calculator.com    Change in PCT <=80%  A decrease of PCT levels below or equal to 80% defines a positive change in PCT test result representing a higher risk for 28-day all-cause mortality of patients diagnosed with severe sepsis for septic shock.    Change in PCT >80%  A decrease of PCT levels of more than 80% defines a negative change in PCT result representing a lower risk for 28-day all-cause mortality of patients diagnosed with severe sepsis or septic shock.       STAT Lactic Acid, Reflex [[id number]]  (Abnormal) Collected: 12/24/23 2258    Specimen: Blood Updated: 12/24/23 2337     Lactate 2.6 mmol/L             Imaging Results (Last 24 Hours)       Procedure Component Value Units Date/Time    XR Foot 3+ View Left [[id number]] Collected: 12/24/23 2004     Updated: 12/24/23 2009    Narrative:      3 VIEWS LEFT FOOT     HISTORY: Diabetic ulcer     COMPARISON: None available.     FINDINGS:  The patient has a focal soft tissue defect seen along the medial and  plantar aspect of the " foot at the level of the metatarsal phalangeal  joint of the great toe. No aggressive osseous abnormalities are seen. No  radiopaque retained foreign body is identified. No acute fracture or  subluxation is identified, although the exam is degraded by the  patient's osteopenia. There is edema of the left foot.       Impression:      No definite cortical breakdown is seen to suggest osteomyelitis,  although MRI would be more sensitive for assessment.     This report was finalized on 12/24/2023 8:06 PM by Dr. Kristina Forman M.D on Workstation: [id number]               Results for orders placed during the hospital encounter of 06/22/22    Adult transthoracic echo complete    Interpretation Summary  · Left ventricular wall thickness is consistent with mild concentric hypertrophy.  · Calculated left ventricular EF = 68% Estimated left ventricular EF was in agreement with the calculated left ventricular EF. Left ventricular systolic function is normal.  · Normal right ventricular cavity size and systolic function noted.  · The left atrial cavity is mildly dilated  · Saline test results are negative.  · Mild mitral valve regurgitation is present.  · There is no evidence of pericardial effusion      No orders to display        Assessment/Plan     Active Hospital Problems    Diagnosis  POA   • **Diabetic foot ulcer [E11.621, L97.509]  Yes   • Primary malignant neuroendocrine tumor of pancreas [C7A.8]  Yes   • Anxiety disorder [F41.9]  Yes   • Type 2 diabetes mellitus with hyperglycemia, with long-term current use of insulin [E11.65, Z79.4]  Not Applicable   • Primary hypothyroidism [E03.9]  Yes   • Hyperlipidemia [E78.5]  Yes   • Benign essential hypertension [I10]  Yes   • Stage 3a chronic kidney disease [N18.31]  Yes       Diabetic Foot Ulcer  -Admit for monitoring  -She has lactic acidosis and elevated CRP. Lactic acidosis has worsened. IVF, trend lactate  -She has been afebrile, WBC ok  -Continue Cefepime and  Vancomycin   -Blood and wound cultures are pending  -X-ray was negative for osseous findings, will check MRI of the left foot  -WOCN consult    Hypertension  -Blood pressures stable. Continue amlodipine and bisoprolol  -Hold lisinopril secondary to elevated creatinine  -Monitor blood pressures    Primary Malignant Neuroendocrine Tumor of Pancreas  -She is followed by oncology outpatient  -She receives Octreotide injections monthly  -Continue high dose PPI    Hyperlipidemia  -Continue statin    Hypothyroidism  -Continue levothyroxine    Type 2 Diabetes Mellitus  -Hold oral diabetic medications  -Initiate correctional factor insulin  -Continue basal insulin at an attenuated dose  -Monitor blood sugars ACHS  -Check hgb A1C    CKD Stage 3a  -Her baseline creatinine is around 1.21  -Hold chlorthalidone, lasix, and lisinopril for now  -IVF overnight  -Avoid any further nephrotoxins  -Repeat lab work in AM    -I discussed the patients findings and my recommendations with patient.    VTE Prophylaxis - SCDs.  Code Status - Full code.       SERGE Moyer  [facility]  12/25/23  02:38 EST

## 2023-12-25 NOTE — PLAN OF CARE
Goal Outcome Evaluation:      MRI of L-foot completed this morning. Receiving IV cefepime and vancomycin. Reports vaginal burning and states that she believes she has a UTI. Urinalysis obtained. Up to [facility] with asst x2. ACHS. Pain controlled. Awaiting wound RN eval. VSS.

## 2023-12-25 NOTE — ED NOTES
"Nursing report ED to floor  Halie Guidry  83 y.o.  female    HPI :   Chief Complaint   Patient presents with    Foot Pain       Admitting doctor:   Milind Cowan MD    Admitting diagnosis:   The primary encounter diagnosis was Diabetic ulcer of left foot associated with type 2 diabetes mellitus, unspecified part of foot, unspecified ulcer stage. Diagnoses of Type 2 diabetes mellitus with hyperglycemia, unspecified whether long term insulin use, Hypertension, unspecified type, Hyperlipidemia, unspecified hyperlipidemia type, Hypothyroidism, unspecified type, and Acute renal failure superimposed on chronic kidney disease, unspecified acute renal failure type, unspecified CKD stage were also pertinent to this visit.    Code status:   Current Code Status       Date Active Code Status Order ID Comments User Context       Prior            Allergies:   Sulfa antibiotics    Isolation:   No active isolations    Intake and Output  No intake or output data in the 24 hours ending 12/24/23 2204    Weight:       12/24/23  1921   Weight: 81.6 kg (180 lb)       Most recent vitals:   Vitals:    12/24/23 1921 12/24/23 1930 12/24/23 2031 12/24/23 2131   BP:  (!) 189/79 146/74 160/79   Pulse: 73      Resp: 18      Temp: 96.7 °F (35.9 °C)      TempSrc: Tympanic      SpO2: 97%      Weight: 81.6 kg (180 lb)      Height: 172.7 cm (68\")          Active LDAs/IV Access:   Lines, Drains & Airways       Active LDAs       Name Placement date Placement time Site Days    Peripheral IV 12/24/23 2025 Left Antecubital 12/24/23 2025  Antecubital  less than 1                    Labs (abnormal labs have a star):   Labs Reviewed   COMPREHENSIVE METABOLIC PANEL - Abnormal; Notable for the following components:       Result Value    Glucose 342 (*)     BUN 34 (*)     Creatinine 1.40 (*)     Sodium 135 (*)     eGFR 37.4 (*)     All other components within normal limits    Narrative:     GFR Normal >60  Chronic Kidney Disease <60  Kidney Failure " "<15    The GFR formula is only valid for adults with stable renal function between ages 18 and 70.   C-REACTIVE PROTEIN - Abnormal; Notable for the following components:    C-Reactive Protein 1.66 (*)     All other components within normal limits   CBC WITH AUTO DIFFERENTIAL - Abnormal; Notable for the following components:    WBC 11.14 (*)     Hemoglobin 11.4 (*)     MCH 26.1 (*)     MCHC 30.9 (*)     Neutrophil % 79.1 (*)     Lymphocyte % 9.1 (*)     Neutrophils, Absolute 8.81 (*)     All other components within normal limits   LACTIC ACID, PLASMA - Abnormal; Notable for the following components:    Lactate 2.2 (*)     All other components within normal limits   SEDIMENTATION RATE - Normal   PROCALCITONIN - Normal    Narrative:     As a Marker for Sepsis (Non-Neonates):    1. <0.5 ng/mL represents a low risk of severe sepsis and/or septic shock.  2. >2 ng/mL represents a high risk of severe sepsis and/or septic shock.    As a Marker for Lower Respiratory Tract Infections that require antibiotic therapy:    PCT on Admission    Antibiotic Therapy       6-12 Hrs later    >0.5                Strongly Recommended  >0.25 - <0.5        Recommended   0.1 - 0.25          Discouraged              Remeasure/reassess PCT  <0.1                Strongly Discouraged     Remeasure/reassess PCT    As 28 day mortality risk marker: \"Change in Procalcitonin Result\" (>80% or <=80%) if Day 0 (or Day 1) and Day 4 values are available. Refer to http://www.[facility]-pct-calculator.com    Change in PCT <=80%  A decrease of PCT levels below or equal to 80% defines a positive change in PCT test result representing a higher risk for 28-day all-cause mortality of patients diagnosed with severe sepsis for septic shock.    Change in PCT >80%  A decrease of PCT levels of more than 80% defines a negative change in PCT result representing a lower risk for 28-day all-cause mortality of patients diagnosed with severe sepsis or septic shock.      BLOOD " CULTURE   BLOOD CULTURE   WOUND CULTURE   LACTIC ACID, REFLEX   CBC AND DIFFERENTIAL    Narrative:     The following orders were created for panel order CBC & Differential.  Procedure                               Abnormality         Status                     ---------                               -----------         ------                     CBC Auto Differential[[id number]]        Abnormal            Final result                 Please view results for these tests on the individual orders.       EKG:   No orders to display       Meds given in ED:   Medications   sodium chloride 0.9 % flush 10 mL (has no administration in time range)   sodium chloride 0.9 % flush 10 mL (has no administration in time range)   vancomycin 1750 mg/500 mL 0.9% NS IVPB ([facility]) (has no administration in time range)   sodium chloride 0.9 % bolus 1,000 mL (has no administration in time range)   insulin regular (humuLIN R,novoLIN R) injection 10 Units (has no administration in time range)   cefepime 2000 mg IVPB in 100 ml NS ([facility]) (2,000 mg Intravenous New Bag 12/24/23 2050)       Imaging results:  XR Foot 3+ View Left    Result Date: 12/24/2023  No definite cortical breakdown is seen to suggest osteomyelitis, although MRI would be more sensitive for assessment.  This report was finalized on 12/24/2023 8:06 PM by Dr. Kristina Forman M.D on Workstation: [id number]       Ambulatory status:   - with assistance    Social issues:   Social History     Socioeconomic History    Marital status:     Number of children: 3   Tobacco Use    Smoking status: Former     Packs/day: 1.00     Years: 40.00     Additional pack years: 0.00     Total pack years: 40.00     Types: Cigarettes     Quit date: 2013     Years since quitting: 10.9    Smokeless tobacco: Never   Vaping Use    Vaping Use: Never used   Substance and Sexual Activity    Alcohol use: No    Drug use: No    Sexual activity: Defer       NIH Stroke Scale:       Ferdinand Rubin RN  12/24/23  22:04 EST

## 2023-12-25 NOTE — ED PROVIDER NOTES
MD ATTESTATION NOTE    The MADINA and I have discussed this patient's history, physical exam, and treatment plan.  I have reviewed the documentation and personally had a face to face interaction with the patient. I affirm the documentation and agree with the treatment and plan.  The attached note describes my personal findings.      I provided a substantive portion of the care of the patient.  I personally performed the physical exam in its entirety, and below are my findings.      Brief HPI: Patient presents for evaluation of left foot ulcer.  Patient is diabetic.  Patient states she has had an ulcer there in the past.  States she had increasing pain there.  Now with drainage.  Patient has had no fevers or chills.    PHYSICAL EXAM  ED Triage Vitals   Temp Heart Rate Resp BP SpO2   12/24/23 1921 12/24/23 1921 12/24/23 1921 12/24/23 1930 12/24/23 1921   96.7 °F (35.9 °C) 73 18 (!) 189/79 97 %      Temp src Heart Rate Source Patient Position BP Location FiO2 (%)   12/24/23 1921 12/24/23 1921 -- -- --   Tympanic Monitor            GENERAL: no acute distress  HENT: nares patent  EYES: no scleral icterus  CV: regular rhythm, normal rate  RESPIRATORY: normal effort  ABDOMEN: soft  MUSCULOSKELETAL: no deformity  NEURO: alert, moves all extremities, follows commands  PSYCH:  calm, cooperative  SKIN: warm, dry.  Left foot with draining tender ulcer with redness    Vital signs and nursing notes reviewed.        Plan: X-ray and lab evaluation       Marco Ferguson MD  12/24/23 2051

## 2023-12-25 NOTE — PROGRESS NOTES
[facility] Clinical Pharmacy Services: Cefepime Consult    Pt Name: Halie Guidry   : 1940  Weight: 81.6 kg (180 lb)  Antibiotic: Cefepime  Indication: Skin and Soft Tissue    Relevant clinical data and objective history reviewed:    Past Medical History:   Diagnosis Date    Acute metabolic encephalopathy 2022    Anxiety     Arthritis     Benign essential hypertension 2014    Bleeding disorder     Depression     Diabetes     Diabetes mellitus, type 2     Disc degeneration, lumbar     Headache, tension-type     Hyperlipidemia     Hypothyroidism     Neuropathy     Osteoporosis 2015    Peripheral neuropathy     Rotator cuff tear, left     Scoliosis     Shoulder pain     LEFT, TORN ROTATOR CUFF S/P FALL    Spinal stenosis      Creatinine   Date Value Ref Range Status   2023 1.40 (H) 0.57 - 1.00 mg/dL Final   2023 1.30 (H) 0.57 - 1.00 mg/dL Final   2023 1.11 (H) 0.60 - 1.10 mg/dL Final     BUN   Date Value Ref Range Status   2023 34 (H) 8 - 23 mg/dL Final     Estimated Creatinine Clearance: 34.1 mL/min (A) (by C-G formula based on SCr of 1.4 mg/dL (H)).    Lab Results   Component Value Date    WBC 11.14 (H) 2023     Temp Readings from Last 3 Encounters:   23 96.7 °F (35.9 °C) (Tympanic)   23 97.7 °F (36.5 °C) (Temporal)   23 98.2 °F (36.8 °C) (Infrared)      Assessment/Plan    Ordered cefepime 1 g iv every 12 hours for a total of 5 days. Will monitor and adjust if culture data or pertinent lab values indicate this is best for the patient.     Thank you for this consult and please contact pharmacy with any questions or concerns.     Calvin Tate III, [facility]  Clinical Pharmacist

## 2023-12-25 NOTE — ED PROVIDER NOTES
EMERGENCY DEPARTMENT ENCOUNTER    Room Number:  08/08  Date of encounter:  12/24/2023  PCP: Napoleon Mead MD  Patient Care Team:  Napoleon Mead MD as PCP - General  Napoleon Mead MD as PCP - Family Medicine  Lilia Orona APRN as Nurse Practitioner (Nurse Practitioner)  Beena Laguerre APRN as Referring Physician (Gastroenterology)  Napoleon Gutierrez MD as Consulting Physician (Hematology and Oncology)  Stefan Gutierrez MD as Consulting Physician (Pain Medicine)   Independent Historians: Patient    HPI:  Chief Complaint: Foot    A complete HPI/ROS/PMH/PSH/SH/FH are unobtainable due to: N/A    Chronic or social conditions impacting patient care (social determinants of health): None    Context: Halie Guidry is a 83 y.o. female with past medical history of HTN, HLD, T2DM, neuropathy and hypothyroidism who arrives to the ED with complaint of left foot wound.  Patient states that she has a history of a diabetic ulcer that had been healing and then a couple of days ago she started to have increased pain to her left foot and then tonight noticed purulent drainage.  Patient states that the pain, redness, and swelling have increased.  Denies any fever or chills, no chest pain or shortness of breath.  Denies any recent injury or trauma.    Review of prior external notes (non-ED): Last admission on 7/21/2023 through 7/30/2023 for HTN, HLD, T2DM, hypothyroidism, and bilateral leg pain and foot ulcer.    Review of prior external test results outside of this encounter: Left foot x-rays from 5/13/2023 show no acute osseous abnormality.    PAST MEDICAL HISTORY  Active Ambulatory Problems     Diagnosis Date Noted    Compression fracture 04/22/2009    Lumbar degenerative disc disease 07/05/2012    Type 2 diabetes mellitus with hyperglycemia, with long-term current use of insulin     Hyperlipidemia     Benign essential hypertension 08/20/2014    Primary hypothyroidism     Neuropathy     Osteoporosis 09/09/2015     Proteinuria 02/28/2012    Tobacco abuse 08/22/2013    Diabetic eye exam 02/12/2014    Generalized weakness 05/27/2017    Spinal stenosis 05/27/2017    Scoliosis 05/27/2017    Arthritis 05/27/2017    VBI (vertebrobasilar insufficiency) 05/28/2017    Orthostatic hypotension 05/28/2017    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 05/28/2017    Severe hypothyroidism 05/30/2017    Noncompliance with medication regimen 05/30/2017    Vitamin D deficiency disease 06/01/2017    Altered mental status 12/15/2017    Tremor 01/09/2018    Seizure 05/21/2019    Stage 3a chronic kidney disease 03/09/2012    Thoracic degenerative disc disease 01/27/2020    Metabolic encephalopathy 12/02/2021    VIPUL (acute kidney injury) 12/02/2021    Hyperglycemia 12/02/2021    Type II diabetes mellitus, uncontrolled 12/02/2021    Lower abdominal pain 02/23/2022    Transaminitis 02/23/2022    COVID-19 virus detected 02/23/2022    UTI (urinary tract infection) 02/23/2022    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    Choledocholithiasis 02/23/2022    Hypokalemia 02/24/2022    Hypoxia 02/24/2022    Generalized abdominal pain 03/26/2022    History of Clostridium difficile infection 03/26/2022    History of ERCP 03/26/2022    Acute UTI (urinary tract infection) 03/27/2022    Hypomagnesemia 03/28/2022    Burning pain 05/27/2022    Anxiety disorder 05/27/2022    Neuropathic pain 05/27/2022    Intertrigo 05/27/2022    Weakness of both lower extremities 06/24/2022    Accelerated hypertension 09/01/2022    Acute cystitis without hematuria 09/06/2022    Altered mental status, unspecified altered mental status type 11/04/2022    Left lower lobe pneumonia 11/04/2022    Acute pain of left foot 05/13/2023    Cellulitis and abscess of left lower extremity 05/15/2023    Hypertensive kidney disease with stage 3a chronic kidney disease 06/02/2023    Bilateral leg pain 07/21/2023    Peripheral edema 07/30/2023    Primary malignant neuroendocrine tumor of  pancreas 08/24/2023    Encounter for long-term (current) use of high-risk medication 08/28/2023     Resolved Ambulatory Problems     Diagnosis Date Noted    Leukocytosis     Acute metabolic encephalopathy 06/22/2022    Hypoglycemia 06/23/2022    Acute metabolic encephalopathy 06/24/2022    Diarrhea 11/04/2022     Past Medical History:   Diagnosis Date    Anxiety     Bleeding disorder     Depression     Diabetes     Diabetes mellitus, type 2     Disc degeneration, lumbar     Headache, tension-type     Hypothyroidism     Peripheral neuropathy     Rotator cuff tear, left     Shoulder pain        The patient has started, but not completed, their COVID-19 vaccination series.    PAST SURGICAL HISTORY  Past Surgical History:   Procedure Laterality Date    APPENDECTOMY      BILATERAL BREAST REDUCTION Bilateral 08/2015    CATARACT EXTRACTION  03/2015    CHOLECYSTECTOMY WITH INTRAOPERATIVE CHOLANGIOGRAM N/A 3/27/2022    Procedure: CHOLECYSTECTOMY LAPAROSCOPIC INTRAOPERATIVE CHOLANGIOGRAM;  Surgeon: Aiyana Hill MD;  Location: [facility] MAIN OR;  Service: General;  Laterality: N/A;    COLONOSCOPY  06/05/2015    WNL    ERCP N/A 2/25/2022    Procedure: ENDOSCOPIC RETROGRADE CHOLANGIOPANCREATOGRAPHY with sphincterotomy and balloon sweep;  Surgeon: Chilo Wilhelm MD;  Location: [facility] ENDOSCOPY;  Service: Gastroenterology;  Laterality: N/A;  PRE/POST - CBD stones    ERCP N/A 3/28/2022    Procedure: ENDOSCOPIC RETROGRADE CHOLANGIOPANCREATOGRAPHY WITH SPHINCTEROTOMY AND BALLOON SWEEP;  Surgeon: Chiol Wilhelm MD;  Location: [facility] ENDOSCOPY;  Service: Gastroenterology;  Laterality: N/A;  PRE: COMMON DUCT STONE  POST: COMMON DUCT STONE    KYPHOPLASTY      REDUCTION MAMMAPLASTY      TONSILLECTOMY           FAMILY HISTORY  Family History   Problem Relation Age of Onset    Bone cancer Mother     No Known Problems Father     Heart disease Daughter          SOCIAL HISTORY  Social History     Socioeconomic History    Marital  status:     Number of children: 3   Tobacco Use    Smoking status: Former     Packs/day: 1.00     Years: 40.00     Additional pack years: 0.00     Total pack years: 40.00     Types: Cigarettes     Quit date: 2013     Years since quitting: 10.9    Smokeless tobacco: Never   Vaping Use    Vaping Use: Never used   Substance and Sexual Activity    Alcohol use: No    Drug use: No    Sexual activity: Defer         ALLERGIES  Sulfa antibiotics        REVIEW OF SYSTEMS  Review of Systems     All systems reviewed and negative except for those discussed in HPI.       PHYSICAL EXAM    I have reviewed the triage vital signs and nursing notes.    ED Triage Vitals   Temp Heart Rate Resp BP SpO2   12/24/23 1921 12/24/23 1921 12/24/23 1921 12/24/23 1930 12/24/23 1921   96.7 °F (35.9 °C) 73 18 (!) 189/79 97 %      Temp src Heart Rate Source Patient Position BP Location FiO2 (%)   12/24/23 1921 12/24/23 1921 -- -- --   Tympanic Monitor          Physical Exam    GENERAL: alert and orient x 4, not distressed  HENT: normocephalic, atraumatic, moist mucous  EYES: no scleral icterus, PERRL, EOMI  CV: regular rhythm, regular rate, intact distal perfusion and brisk distal capillary refill  RESPIRATORY: normal effort, CTAB  MUSCULOSKELETAL: no deformity, normal ROM, tenderness to the left foot at the first MTP joint with surrounding soft tissue swelling, warmth, and purulent drainage  NEURO: alert, moves all extremities, no focal neuro deficits, follows commands  SKIN: warm, dry, no rash, abscess with purulent drainage overlying the left first MTP joint with surrounding erythema  Psych: Appropriate mood and affect      Nursing notes and vital signs reviewed      LAB RESULTS  Recent Results (from the past 24 hour(s))   Comprehensive Metabolic Panel    Collection Time: 12/24/23  8:26 PM    Specimen: Blood   Result Value Ref Range    Glucose 342 (H) 65 - 99 mg/dL    BUN 34 (H) 8 - 23 mg/dL    Creatinine 1.40 (H) 0.57 - 1.00 mg/dL     Sodium 135 (L) 136 - 145 mmol/L    Potassium 3.9 3.5 - 5.2 mmol/L    Chloride 98 98 - 107 mmol/L    CO2 23.2 22.0 - 29.0 mmol/L    Calcium 8.9 8.6 - 10.5 mg/dL    Total Protein 7.5 6.0 - 8.5 g/dL    Albumin 3.6 3.5 - 5.2 g/dL    ALT (SGPT) 7 1 - 33 U/L    AST (SGOT) 12 1 - 32 U/L    Alkaline Phosphatase 114 39 - 117 U/L    Total Bilirubin 0.3 0.0 - 1.2 mg/dL    Globulin 3.9 gm/dL    A/G Ratio 0.9 g/dL    BUN/Creatinine Ratio 24.3 7.0 - 25.0    Anion Gap 13.8 5.0 - 15.0 mmol/L    eGFR 37.4 (L) >60.0 mL/min/1.73   C-reactive Protein    Collection Time: 12/24/23  8:26 PM    Specimen: Blood   Result Value Ref Range    C-Reactive Protein 1.66 (H) 0.00 - 0.50 mg/dL   Sedimentation Rate    Collection Time: 12/24/23  8:26 PM    Specimen: Blood   Result Value Ref Range    Sed Rate 25 0 - 30 mm/hr   CBC Auto Differential    Collection Time: 12/24/23  8:26 PM    Specimen: Blood   Result Value Ref Range    WBC 11.14 (H) 3.40 - 10.80 10*3/mm3    RBC 4.36 3.77 - 5.28 10*6/mm3    Hemoglobin 11.4 (L) 12.0 - 15.9 g/dL    Hematocrit 36.9 34.0 - 46.6 %    MCV 84.6 79.0 - 97.0 fL    MCH 26.1 (L) 26.6 - 33.0 pg    MCHC 30.9 (L) 31.5 - 35.7 g/dL    RDW 14.5 12.3 - 15.4 %    RDW-SD 44.9 37.0 - 54.0 fl    MPV 9.4 6.0 - 12.0 fL    Platelets 360 140 - 450 10*3/mm3    Neutrophil % 79.1 (H) 42.7 - 76.0 %    Lymphocyte % 9.1 (L) 19.6 - 45.3 %    Monocyte % 7.9 5.0 - 12.0 %    Eosinophil % 2.7 0.3 - 6.2 %    Basophil % 0.8 0.0 - 1.5 %    Immature Grans % 0.4 0.0 - 0.5 %    Neutrophils, Absolute 8.81 (H) 1.70 - 7.00 10*3/mm3    Lymphocytes, Absolute 1.01 0.70 - 3.10 10*3/mm3    Monocytes, Absolute 0.88 0.10 - 0.90 10*3/mm3    Eosinophils, Absolute 0.30 0.00 - 0.40 10*3/mm3    Basophils, Absolute 0.09 0.00 - 0.20 10*3/mm3    Immature Grans, Absolute 0.05 0.00 - 0.05 10*3/mm3    nRBC 0.0 0.0 - 0.2 /100 WBC   Lactic Acid, Plasma    Collection Time: 12/24/23  8:26 PM    Specimen: Blood   Result Value Ref Range    Lactate 2.2 (C) 0.5 - 2.0 mmol/L    Procalcitonin    Collection Time: 12/24/23  8:26 PM    Specimen: Blood   Result Value Ref Range    Procalcitonin 0.07 0.00 - 0.25 ng/mL       Ordered the above labs and independently reviewed and interpreted the results by me.        RADIOLOGY  XR Foot 3+ View Left    Result Date: 12/24/2023  3 VIEWS LEFT FOOT  HISTORY: Diabetic ulcer  COMPARISON: None available.  FINDINGS: The patient has a focal soft tissue defect seen along the medial and plantar aspect of the foot at the level of the metatarsal phalangeal joint of the great toe. No aggressive osseous abnormalities are seen. No radiopaque retained foreign body is identified. No acute fracture or subluxation is identified, although the exam is degraded by the patient's osteopenia. There is edema of the left foot.      No definite cortical breakdown is seen to suggest osteomyelitis, although MRI would be more sensitive for assessment.  This report was finalized on 12/24/2023 8:06 PM by Dr. Kristina Forman M.D on Workstation: [id number]       I ordered the above noted radiological studies.  These were independently interpreted and reviewed by me.  See dictation for official radiology interpretation.      PROCEDURES    Procedures      MEDICATIONS GIVEN IN ER    Medications   sodium chloride 0.9 % flush 10 mL (has no administration in time range)   sodium chloride 0.9 % flush 10 mL (has no administration in time range)   vancomycin 1750 mg/500 mL 0.9% NS IVPB ([facility]) (has no administration in time range)   sodium chloride 0.9 % bolus 1,000 mL (has no administration in time range)   cefepime 2000 mg IVPB in 100 ml NS (VTB) (2,000 mg Intravenous New Bag 12/24/23 2050)   insulin regular (humuLIN R,novoLIN R) injection 10 Units (10 Units Intravenous Given 12/24/23 2207)         PROGRESS, DATA ANALYSIS, CONSULTS, AND MEDICAL DECISION MAKING    All labs have been independently reviewed by me.  All radiology studies have been reviewed by me and discussed with radiologist  dictating the report.   EKG's independently viewed and interpreted by me.  Discussion below represents my analysis of pertinent findings related to patient's condition, differential diagnosis, treatment plan and final disposition.    DDx:  Includes, but is not limited to abscess, cellulitis, septic arthritis, diabetic ulcer, osteomyelitis    After my initial assessment I am concerned about osteomyelitis and patient may need hospitalization due to IV antibiotics.    ED Course as of 12/24/23 2207   Sun Dec 24, 2023   2021 X-ray images independently viewed by me, patient has osteoporosis, soft tissue deficit seen adjacent to the head of the first metatarsal, no fracture. [RICKEY]   2042 Patient rechecked, discussed radiology images and plan to go ahead and give IV antibiotics while awaiting blood results. [RICKEY]   2043 WBC(!): 11.14 [RICKEY]   2043 Hemoglobin(!): 11.4 [RICKEY]   2043 Hematocrit: 36.9 [RICKEY]   2043 Platelets: 360 [RICKEY]   2101 Glucose(!): 342 [RICKEY]   2101 BUN(!): 34 [RICKEY]   2102 Creatinine(!): 1.40 [RICKEY]   2102 Sodium(!): 135 [RICKEY]   2102 Potassium: 3.9 [RICKEY]   2102 Chloride: 98 [RICKEY]   2102 CO2: 23.2 [RICKEY]   2102 Lactate(!!): 2.2 [RICKEY]   2104 C-Reactive Protein(!): 1.66 [RICKEY]   2104 Sed Rate: 25 [RICKEY]   2123 Patient rechecked, discussed results, diagnosis, recommendation for admission.  Patient family expressed understanding and agree with plan. [RICKEY]   2156 Patient history, ER presentation and evaluation discussed with SERGE Chen, will admit to [facility] bed per Dr. Cowan. [RICKEY]      ED Course User Index  [RICKEY] Ulisses Lemus PA       MDM: Patient with purulent infection to the left foot with uncontrolled diabetes and evidence of infection will treat with IV antibiotics and admission.    PPE:  The patient wore a mask and I wore a mask and all appropriate PPE throughout the entire patient encounter.      AS OF 22:07 EST VITALS:    BP - 160/79  HR - 73  TEMP - 96.7 °F (35.9 °C) (Tympanic)  O2 SATS - 97%      DIAGNOSIS  Final  diagnoses:   Diabetic ulcer of left foot associated with type 2 diabetes mellitus, unspecified part of foot, unspecified ulcer stage   Type 2 diabetes mellitus with hyperglycemia, unspecified whether long term insulin use   Hypertension, unspecified type   Hyperlipidemia, unspecified hyperlipidemia type   Hypothyroidism, unspecified type   Acute renal failure superimposed on chronic kidney disease, unspecified acute renal failure type, unspecified CKD stage         DISPOSITION  ADMISSION    Discussed treatment plan and reason for admission with pt/family and admitting physician.  Pt/family voiced understanding of the plan for admission for further testing/treatment as needed.       Note Disclaimer: At [facility], we believe that sharing information builds trust and better relationships. You are receiving this note because you recently visited [facility]. It is possible you will see health information before a provider has talked with you about it. This kind of information can be easy to misunderstand. To help you fully understand what it means for your health, we urge you to discuss this note with your provider.       Ulisses Lemus PA  12/24/23 3008

## 2023-12-25 NOTE — PROGRESS NOTES
"[facility] Clinical Pharmacy Services: Vancomycin Pharmacokinetic Initial Consult Note    Halie Guidry is a 83 y.o. female who is on day 1 of pharmacy to dose vancomycin.    Indication: Skin and Soft Tissue  Consulting Provider: SERGE Conley  Planned Duration of Therapy: 5 days  Loading Dose Ordered or Given: 1750 mg on 12/24 at 2209  Culture/Source:   12/24 blood cultures in process  12/24 wound culture in process  Target: -600 mg/L.hr   Other Antimicrobials: cefepime 1 g iv every 12 hours    Vitals/Labs  Ht: 172.7 cm (68\"); Wt: 81.6 kg (180 lb)  Temp Readings from Last 1 Encounters:   12/24/23 96.7 °F (35.9 °C) (Tympanic)    Estimated Creatinine Clearance: 34.1 mL/min (A) (by C-G formula based on SCr of 1.4 mg/dL (H)).     Results from last 7 days   Lab Units 12/24/23 2026   CREATININE mg/dL 1.40*   WBC 10*3/mm3 11.14*     Assessment/Plan:    Vancomycin Dose:   750 mg IV every  24  hours  Predictive AUC level for the dose ordered is 409 mg/L.hr, which is within the target of 400-600 mg/L.hr  Vanc Trough has been ordered for 12/26 at 0930     Pharmacy will follow patient's kidney function and will adjust doses and obtain levels as necessary. Thank you for involving pharmacy in this patient's care. Please contact pharmacy with any questions or concerns.                           Calvin Tate III, [facility]  Clinical Pharmacist    "

## 2023-12-25 NOTE — PLAN OF CARE
Goal Outcome Evaluation:      Admitted from ER last night with diabetic ulcer on the bottom of left foot. Pt has difficulty walking due to it. Purewick placed. Redness in bilateral inguinal folds noted. Bilateral lower legs redness. Lactic acid elevated. 2 ABs started. Continue to monitor.

## 2023-12-26 LAB
ANION GAP SERPL CALCULATED.3IONS-SCNC: 14 MMOL/L (ref 5–15)
BACTERIA SPEC AEROBE CULT: NO GROWTH
BUN SERPL-MCNC: 24 MG/DL (ref 8–23)
BUN/CREAT SERPL: 20.2 (ref 7–25)
CALCIUM SPEC-SCNC: 8.8 MG/DL (ref 8.6–10.5)
CHLORIDE SERPL-SCNC: 102 MMOL/L (ref 98–107)
CO2 SERPL-SCNC: 21 MMOL/L (ref 22–29)
CREAT SERPL-MCNC: 1.19 MG/DL (ref 0.57–1)
DEPRECATED RDW RBC AUTO: 45 FL (ref 37–54)
EGFRCR SERPLBLD CKD-EPI 2021: 45.5 ML/MIN/1.73
ERYTHROCYTE [DISTWIDTH] IN BLOOD BY AUTOMATED COUNT: 14.7 % (ref 12.3–15.4)
GLUCOSE BLDC GLUCOMTR-MCNC: 233 MG/DL (ref 70–130)
GLUCOSE BLDC GLUCOMTR-MCNC: 243 MG/DL (ref 70–130)
GLUCOSE BLDC GLUCOMTR-MCNC: 330 MG/DL (ref 70–130)
GLUCOSE BLDC GLUCOMTR-MCNC: 369 MG/DL (ref 70–130)
GLUCOSE SERPL-MCNC: 279 MG/DL (ref 65–99)
HCT VFR BLD AUTO: 32.7 % (ref 34–46.6)
HGB BLD-MCNC: 10.8 G/DL (ref 12–15.9)
MCH RBC QN AUTO: 28.1 PG (ref 26.6–33)
MCHC RBC AUTO-ENTMCNC: 33 G/DL (ref 31.5–35.7)
MCV RBC AUTO: 84.9 FL (ref 79–97)
PLATELET # BLD AUTO: 303 10*3/MM3 (ref 140–450)
PMV BLD AUTO: 9.8 FL (ref 6–12)
POTASSIUM SERPL-SCNC: 4.3 MMOL/L (ref 3.5–5.2)
RBC # BLD AUTO: 3.85 10*6/MM3 (ref 3.77–5.28)
SODIUM SERPL-SCNC: 137 MMOL/L (ref 136–145)
VANCOMYCIN TROUGH SERPL-MCNC: 11.7 MCG/ML (ref 5–20)
WBC NRBC COR # BLD AUTO: 8.32 10*3/MM3 (ref 3.4–10.8)

## 2023-12-26 PROCEDURE — 25810000003 SODIUM CHLORIDE 0.9 % SOLUTION: Performed by: STUDENT IN AN ORGANIZED HEALTH CARE EDUCATION/TRAINING PROGRAM

## 2023-12-26 PROCEDURE — 96376 TX/PRO/DX INJ SAME DRUG ADON: CPT

## 2023-12-26 PROCEDURE — 97162 PT EVAL MOD COMPLEX 30 MIN: CPT

## 2023-12-26 PROCEDURE — 80202 ASSAY OF VANCOMYCIN: CPT | Performed by: NURSE PRACTITIONER

## 2023-12-26 PROCEDURE — 85027 COMPLETE CBC AUTOMATED: CPT | Performed by: STUDENT IN AN ORGANIZED HEALTH CARE EDUCATION/TRAINING PROGRAM

## 2023-12-26 PROCEDURE — 25010000002 CEFEPIME PER 500 MG: Performed by: NURSE PRACTITIONER

## 2023-12-26 PROCEDURE — 25010000002 MORPHINE PER 10 MG: Performed by: NURSE PRACTITIONER

## 2023-12-26 PROCEDURE — 97530 THERAPEUTIC ACTIVITIES: CPT

## 2023-12-26 PROCEDURE — 82948 REAGENT STRIP/BLOOD GLUCOSE: CPT

## 2023-12-26 PROCEDURE — 25010000002 VANCOMYCIN 10 G RECONSTITUTED SOLUTION: Performed by: STUDENT IN AN ORGANIZED HEALTH CARE EDUCATION/TRAINING PROGRAM

## 2023-12-26 PROCEDURE — 63710000001 INSULIN GLARGINE PER 5 UNITS: Performed by: STUDENT IN AN ORGANIZED HEALTH CARE EDUCATION/TRAINING PROGRAM

## 2023-12-26 PROCEDURE — 63710000001 INSULIN LISPRO (HUMAN) PER 5 UNITS: Performed by: STUDENT IN AN ORGANIZED HEALTH CARE EDUCATION/TRAINING PROGRAM

## 2023-12-26 PROCEDURE — 80048 BASIC METABOLIC PNL TOTAL CA: CPT | Performed by: STUDENT IN AN ORGANIZED HEALTH CARE EDUCATION/TRAINING PROGRAM

## 2023-12-26 PROCEDURE — 25810000003 SODIUM CHLORIDE 0.9 % SOLUTION: Performed by: NURSE PRACTITIONER

## 2023-12-26 RX ADMIN — TERAZOSIN HYDROCHLORIDE 1 MG: 1 CAPSULE ORAL at 20:33

## 2023-12-26 RX ADMIN — VANCOMYCIN HYDROCHLORIDE 1250 MG: 10 INJECTION, POWDER, LYOPHILIZED, FOR SOLUTION INTRAVENOUS at 12:38

## 2023-12-26 RX ADMIN — Medication 10 ML: at 09:20

## 2023-12-26 RX ADMIN — INSULIN LISPRO 3 UNITS: 100 INJECTION, SOLUTION INTRAVENOUS; SUBCUTANEOUS at 17:22

## 2023-12-26 RX ADMIN — INSULIN LISPRO 6 UNITS: 100 INJECTION, SOLUTION INTRAVENOUS; SUBCUTANEOUS at 12:38

## 2023-12-26 RX ADMIN — INSULIN GLARGINE 25 UNITS: 100 INJECTION, SOLUTION SUBCUTANEOUS at 09:13

## 2023-12-26 RX ADMIN — LISINOPRIL 40 MG: 40 TABLET ORAL at 09:19

## 2023-12-26 RX ADMIN — HYDROCODONE BITARTRATE AND ACETAMINOPHEN 1 TABLET: 10; 325 TABLET ORAL at 15:27

## 2023-12-26 RX ADMIN — AMLODIPINE BESYLATE 5 MG: 5 TABLET ORAL at 09:19

## 2023-12-26 RX ADMIN — PANTOPRAZOLE SODIUM 40 MG: 40 TABLET, DELAYED RELEASE ORAL at 09:18

## 2023-12-26 RX ADMIN — BISOPROLOL FUMARATE 5 MG: 5 TABLET, FILM COATED ORAL at 09:20

## 2023-12-26 RX ADMIN — PANTOPRAZOLE SODIUM 40 MG: 40 TABLET, DELAYED RELEASE ORAL at 17:22

## 2023-12-26 RX ADMIN — PREGABALIN 50 MG: 50 CAPSULE ORAL at 15:28

## 2023-12-26 RX ADMIN — HYDROCODONE BITARTRATE AND ACETAMINOPHEN 1 TABLET: 10; 325 TABLET ORAL at 09:18

## 2023-12-26 RX ADMIN — Medication 10 ML: at 20:31

## 2023-12-26 RX ADMIN — MORPHINE SULFATE 2 MG: 2 INJECTION, SOLUTION INTRAMUSCULAR; INTRAVENOUS at 06:48

## 2023-12-26 RX ADMIN — INSULIN GLARGINE 10 UNITS: 100 INJECTION, SOLUTION SUBCUTANEOUS at 15:28

## 2023-12-26 RX ADMIN — DULOXETINE HYDROCHLORIDE 30 MG: 30 CAPSULE, DELAYED RELEASE ORAL at 09:20

## 2023-12-26 RX ADMIN — LEVOTHYROXINE SODIUM 112 MCG: 112 TABLET ORAL at 06:10

## 2023-12-26 RX ADMIN — PREGABALIN 50 MG: 50 CAPSULE ORAL at 20:30

## 2023-12-26 RX ADMIN — INSULIN LISPRO 3 UNITS: 100 INJECTION, SOLUTION INTRAVENOUS; SUBCUTANEOUS at 09:13

## 2023-12-26 RX ADMIN — CHLORTHALIDONE 25 MG: 25 TABLET ORAL at 09:19

## 2023-12-26 RX ADMIN — FUROSEMIDE 20 MG: 20 TABLET ORAL at 09:18

## 2023-12-26 RX ADMIN — HYDROCODONE BITARTRATE AND ACETAMINOPHEN 1 TABLET: 10; 325 TABLET ORAL at 20:31

## 2023-12-26 RX ADMIN — ATORVASTATIN CALCIUM 80 MG: 20 TABLET, FILM COATED ORAL at 20:31

## 2023-12-26 RX ADMIN — CEFEPIME HYDROCHLORIDE 1000 MG: 1 INJECTION, POWDER, FOR SOLUTION INTRAMUSCULAR; INTRAVENOUS at 20:30

## 2023-12-26 RX ADMIN — SODIUM CHLORIDE 75 ML/HR: 9 INJECTION, SOLUTION INTRAVENOUS at 20:32

## 2023-12-26 RX ADMIN — PREGABALIN 50 MG: 50 CAPSULE ORAL at 09:18

## 2023-12-26 RX ADMIN — INSULIN LISPRO 5 UNITS: 100 INJECTION, SOLUTION INTRAVENOUS; SUBCUTANEOUS at 20:30

## 2023-12-26 RX ADMIN — CEFEPIME HYDROCHLORIDE 1000 MG: 1 INJECTION, POWDER, FOR SOLUTION INTRAMUSCULAR; INTRAVENOUS at 09:13

## 2023-12-26 NOTE — NURSING NOTE
12/26/23 1430   Wound 12/24/23 1938 Left posterior plantar Diabetic Ulcer   Placement Date/Time: 12/24/23 1938   Present on Original Admission: Yes  Side: Left  Orientation: posterior  Location: plantar  Primary Wound Type: Diabetic Ulcer   Wound Image    Base maroon/purple  (fluid filled blister)   Periwound intact;dry;redness;swelling  (tenderness)   Periwound Temperature warm   Wound Length (cm) 2 cm   Wound Width (cm) 3 cm   Wound Surface Area (cm^2) 6 cm^2   Drainage Characteristics/Odor purulent;serosanguineous   Drainage Amount scant   Care, Wound cleansed with;antimicrobial agent applied   Dressing Care   (betadine wet to dry dressing placed, covered with Optifoam)     CWON note: pt seen for evaluation of left plantar foot wound, which has been ongoing and nonhealing. Pt denies any trauma to the area or stepping on a foreign object. MRI was completed. Since this has been ongoing, it may be beneficial to have podiatry assess this wound. RN messaged Dr Mendoza and consult placed. In the meantime betadine wet to dry dressings have been initiated. Wound care orders placed into [facility]. Please re-consult for any additional needs.

## 2023-12-26 NOTE — PROGRESS NOTES
Name: Halie Guidry ADMIT: 2023   : 1940  PCP: Napoleon Mead MD    MRN: [id number] LOS: 2 days   AGE/SEX: 83 y.o. female  ROOM: [facility]     Subjective   Subjective   Patient resting comfortably in bed. No complaints.    Review of Systems   Constitutional:  Negative for chills and fever.   Respiratory:  Negative for cough and shortness of breath.    Cardiovascular:  Negative for chest pain and leg swelling.   Gastrointestinal:  Negative for abdominal pain, diarrhea and nausea.     As above     Objective   Objective   Vital Signs  Temp:  [97.4 °F (36.3 °C)-97.9 °F (36.6 °C)] 97.9 °F (36.6 °C)  Heart Rate:  [60-68] 64  Resp:  [18] 18  BP: (145-170)/(66-71) 145/71  SpO2:  [92 %-95 %] 95 %  on   ;   Device (Oxygen Therapy): room air  Body mass index is 31.88 kg/m².  Physical Exam  Constitutional:       General: She is not in acute distress.     Appearance: She is not ill-appearing.   Cardiovascular:      Rate and Rhythm: Normal rate and regular rhythm.   Pulmonary:      Effort: Pulmonary effort is normal. No respiratory distress.   Abdominal:      General: Abdomen is flat. There is no distension.      Tenderness: There is no abdominal tenderness.   Musculoskeletal:         General: No swelling or deformity. Normal range of motion.   Skin:     General: Skin is warm and dry.      Comments: Ulcer on the plantar left great toe.  Evidence of bilateral redness consistent with bilateral venous stasis dermatitis.   Neurological:      General: No focal deficit present.      Mental Status: She is alert. Mental status is at baseline.         Results Review     I reviewed the patient's new clinical results.  Results from last 7 days   Lab Units 23  0922 23   WBC 10*3/mm3 8.32 11.14*   HEMOGLOBIN g/dL 10.8* 11.4*   PLATELETS 10*3/mm3 303 360     Results from last 7 days   Lab Units 23  0922 23  1105 23   SODIUM mmol/L 137  --  135*   POTASSIUM mmol/L 4.3  --  3.9    CHLORIDE mmol/L 102  --  98   CO2 mmol/L 21.0*  --  23.2   BUN mg/dL 24*  --  34*   CREATININE mg/dL 1.19* 1.26* 1.40*   GLUCOSE mg/dL 279*  --  342*   Estimated Creatinine Clearance: 43.2 mL/min (A) (by C-G formula based on SCr of 1.19 mg/dL (H)).  Results from last 7 days   Lab Units 12/24/23 2026   ALBUMIN g/dL 3.6   BILIRUBIN mg/dL 0.3   ALK PHOS U/L 114   AST (SGOT) U/L 12   ALT (SGPT) U/L 7     Results from last 7 days   Lab Units 12/26/23  0922 12/24/23 2026   CALCIUM mg/dL 8.8 8.9   ALBUMIN g/dL  --  3.6     Results from last 7 days   Lab Units 12/25/23  0554 12/24/23  2258 12/24/23 2026   PROCALCITONIN ng/mL  --   --  0.07   LACTATE mmol/L 1.8 2.6* 2.2*     COVID19   Date Value Ref Range Status   05/13/2023 Not Detected Not Detected - Ref. Range Final   11/03/2022 Not Detected Not Detected - Ref. Range Final     Hemoglobin A1C   Date/Time Value Ref Range Status   12/24/2023 2026 8.20 (H) 4.80 - 5.60 % Final     Glucose   Date/Time Value Ref Range Status   12/26/2023 1224 369 (H) 70 - 130 mg/dL Final   12/26/2023 0823 243 (H) 70 - 130 mg/dL Final   12/25/2023 2144 277 (H) 70 - 130 mg/dL Final   12/25/2023 1715 323 (H) 70 - 130 mg/dL Final   12/25/2023 1159 338 (H) 70 - 130 mg/dL Final   12/25/2023 0938 283 (H) 70 - 130 mg/dL Final       MRI Foot Left Without Contrast  Narrative: Patient: TABATHA CARTER  Time Out: 16:46  Exam(s): MRI LEFT FOOT Without Contrast     EXAM:  MR Left Lower Extremity Without Intravenous Contrast, Foot    CLINICAL HISTORY:  Reason for exam: diabetic ulcer.    TECHNIQUE:  Multiplanar magnetic resonance images of the left foot without   intravenous contrast.    COMPARISON:  Left foot radiographs 12 24 23    FINDINGS:  Skin markers in place along the plantar aspect of the foot at the level   of the interspace between the distal first and second metatarsal shafts.    No focal soft tissue abnormalities noted in this location.    There is a soft tissue ulcer along the  plantar-medial aspect of the first   metatarsal head.    There is generalized subcutaneous edema of the foot which may represent   cellulitis.  No organized fluid collection is identified to suggest   abscess.    There are no characteristic marrow signal changes to suggest acute   osteomyelitis.  There is no imaging evidence of septic arthritis.    Osteoarthritic changes noted at the first MTP joint.  There is no   fracture or dislocation.    IMPRESSION:     1.  Soft tissue ulcer at the plantar-medial aspect of the first MTP joint.    Generalized subcutaneous edema cellulitis.  2.  No evidence of acute osteomyelitis.  Impression: Electronically signed by Haley Conley M.D. on 12-25-23 at 1646    I reviewed the patient's daily medications.  Scheduled Medications  amLODIPine, 5 mg, Oral, Daily  atorvastatin, 80 mg, Oral, Nightly  bisoprolol, 5 mg, Oral, Daily  cefepime, 1,000 mg, Intravenous, Q12H  chlorthalidone, 25 mg, Oral, Daily  DULoxetine, 30 mg, Oral, Daily  furosemide, 20 mg, Oral, Daily  HYDROcodone-acetaminophen, 1 tablet, Oral, TID  insulin glargine, 25 Units, Subcutaneous, Daily  insulin lispro, 2-7 Units, Subcutaneous, 4x Daily AC & at Bedtime  levothyroxine, 112 mcg, Oral, Q AM  lisinopril, 40 mg, Oral, Daily  pantoprazole, 40 mg, Oral, BID AC  pregabalin, 50 mg, Oral, TID  sodium chloride, 10 mL, Intravenous, Q12H  terazosin, 1 mg, Oral, Nightly  vancomycin, 1,250 mg, Intravenous, Q24H    Infusions  Pharmacy to dose vancomycin,   sodium chloride, 75 mL/hr, Last Rate: 75 mL/hr (12/25/23 0355)    Diet  Diet: Diabetic Diets; Consistent Carbohydrate; Texture: Regular Texture (IDDSI 7); Fluid Consistency: Thin (IDDSI 0)         I have personally reviewed:  [x]  Laboratory   [x]  Microbiology   [x]  Radiology   []  EKG/Telemetry   []  Cardiology/Vascular   []  Pathology   [x]  Records     Assessment/Plan     Active Hospital Problems    Diagnosis  POA    **Diabetic foot ulcer [E11.621, L97.509]  Yes    Primary  malignant neuroendocrine tumor of pancreas [C7A.8]  Yes    Anxiety disorder [F41.9]  Yes    Type 2 diabetes mellitus with hyperglycemia, with long-term current use of insulin [E11.65, Z79.4]  Not Applicable    Primary hypothyroidism [E03.9]  Yes    Hyperlipidemia [E78.5]  Yes    Benign essential hypertension [I10]  Yes    Stage 3a chronic kidney disease [N18.31]  Yes      Resolved Hospital Problems   No resolved problems to display.       83 y.o. female admitted with Diabetic foot ulcer.      Acute on chronic diabetic foot ulcer  Chronic venous stasis dermatitis  -Cefepime and vancomycin  -Blood cultures pending  -MRI foot soft tissue ulcer of first MTP joint, no osteomyelitis  -Wound care consulted-recommend podiatry consult.  -Podiatry consulted, pressure recommendations     Hypertension-continue home medications     Diabetes type 2 on chronic insulin, A1c 8.2%  -On 45 units of glargine at home  -increase glargine to 35 unitis sliding scale insulin, titrate as needed     Hypothyroidism-continue home Synthroid     Neuroendocrine tumor of the pancreas-follows oncology as an outpatient.  Currently getting octreotide injections monthly.     CKD 3 stage IIIa, stable, baseline    SCDs for DVT prophylaxis.  Full code.  Discussed with patient and nursing staff.  Anticipate discharge home with HH vs SNU facility when cleared by consultants.    Expected Discharge Date: 12/28/2023; Expected Discharge Time:       Cedrick Mendoza MD  [facility]  12/26/23  15:01 EST

## 2023-12-26 NOTE — PROGRESS NOTES
[facility] Clinical Pharmacy Services: Vancomycin Monitoring Note    Halie Guidry is a 83 y.o. female who is on day 3/5 of pharmacy to dose vancomycin for Skin and Soft Tissue.    Estimated Creatinine Clearance: 43.2 mL/min (A) (by C-G formula based on SCr of 1.19 mg/dL (H)).    Current Vanc Dose: 750 mg IV every 24 hours  Results from last 7 days   Lab Units 12/26/23  0922   VANCOMYCIN TR mcg/mL 11.70   Predicted AUC at current dose: 306 mg/L.hr    Adjust to Vancomycin 1250mg (13mg/kg) IV q24h for predicted AUC of 507mg/L*hr. L foot wound cx with GNR and staph aureus, sensitivities pending.     Next Level Date and Time: No levels currently pending. Will follow daily and order as appropriate.    Pharmacy is continuing to monitor and will adjust as needed.    Ziggy Stephenson, [facility]  Clinical Pharmacist

## 2023-12-26 NOTE — PLAN OF CARE
Goal Outcome Evaluation:  Plan of Care Reviewed With: patient           Outcome Evaluation: Pt is an 82 y/o female admitted to [facility] for a diabetic ulcer on her L foot. Prior to admission she was Gm with all mobility using a rwx and lives with her son in a 1 story condo with 0 CYRUS. Today she presented with mobility below baseline, decreased tolerance to activity, and generalized LE weakness. She was SBA for bed mobility. She stood on the 3rd attempt with modA and rwx from an elevated bed. She took 3 steps to the chair with Jayson. Her activity tolerance was decreased d/t increasing pain in her foot. Patient will benefit from continued skilled PT addressing limitations in functional mobility to maximize safety and independence. Therapy recommendations include SNF at d/c and will continue to be assessed as pt progresses.      Anticipated Discharge Disposition (PT): skilled nursing facility

## 2023-12-26 NOTE — CASE MANAGEMENT/SOCIAL WORK
Discharge Planning Assessment  [facility]     Patient Name: Halie Guidry  MRN: [id number]  Today's Date: 12/26/2023    Admit Date: 12/24/2023    Plan: Home with fmaily to transport   Discharge Needs Assessment       Row Name 12/26/23 1651       Living Environment    People in Home child(beatriz), adult    Current Living Arrangements condominium    Potentially Unsafe Housing Conditions none    Primary Care Provided by self    Provides Primary Care For no one    Family Caregiver if Needed child(beatriz), adult    Family Caregiver Names Derrick [phone]    Quality of Family Relationships unable to assess    Able to Return to Prior Arrangements yes       Resource/Environmental Concerns    Resource/Environmental Concerns none    Transportation Concerns none       Transition Planning    Patient/Family Anticipates Transition to home with family    Patient/Family Anticipated Services at Transition     Transportation Anticipated family or friend will provide       Discharge Needs Assessment    Readmission Within the Last 30 Days no previous admission in last 30 days    Equipment Currently Used at Home walker, rolling;glucometer;shower chair    Concerns to be Addressed denies needs/concerns at this time;discharge planning    Anticipated Changes Related to Illness none    Equipment Needed After Discharge none                   Discharge Plan       Row Name 12/26/23 3003       Plan    Plan Home with fmaily to transport    Roadmap to Recovery Yes    Patient/Family in Agreement with Plan yes    Provided Post Acute Provider List? N/A    N/A Provider List Comment Patient denies need    Provided Post Acute Provider Quality & Resource List? N/A    N/A Quality & Resource List Comment Patient denies need    Plan Comments Spoke with patient at bedside. Introduced self and explained CCP role. Verified face sheet and local pharmacy is [facility], [facility]; patient chose to enroll in B.  Patient lives with son in 1st floor Condo with 0 CYRUS. Patient state she has used [employer]. Patient has been to [place] and [facility]. Patient has a rollator, rolling walker, shower chair, and grab bars. Patient plans to return home with family to transport.                  Continued Care and Services - Admitted Since 12/24/2023    Coordination has not been started for this encounter.       Expected Discharge Date and Time       Expected Discharge Date Expected Discharge Time    Dec 28, 2023            Demographic Summary       Row Name 12/26/23 8847       General Information    Admission Type inpatient    Required Notices Provided Important Message from Medicare    Referral Source admission list    Reason for Consult discharge planning    Preferred Language English       Contact Information    Permission Granted to Share Info With                    Functional Status       Row Name 12/26/23 8570       Functional Status    Usual Activity Tolerance moderate    Current Activity Tolerance moderate       Functional Status, IADL    Medications independent    Meal Preparation independent    Housekeeping independent    Laundry independent    Shopping independent       Mental Status    General Appearance WDL WDL                   Psychosocial    No documentation.                  Abuse/Neglect    No documentation.                  Legal    No documentation.                  Substance Abuse    No documentation.                  Patient Forms    No documentation.                     Maribeth Khan RN

## 2023-12-26 NOTE — THERAPY EVALUATION
Patient Name: Halie Guidry  : 1940    MRN: [id number]                              Today's Date: 2023       Admit Date: 2023    Visit Dx:     ICD-10-CM ICD-9-CM   1. Diabetic ulcer of left foot associated with type 2 diabetes mellitus, unspecified part of foot, unspecified ulcer stage  E11.621 250.80    L97.529 707.15   2. Type 2 diabetes mellitus with hyperglycemia, unspecified whether long term insulin use  E11.65 250.00   3. Hypertension, unspecified type  I10 401.9   4. Hyperlipidemia, unspecified hyperlipidemia type  E78.5 272.4   5. Hypothyroidism, unspecified type  E03.9 244.9   6. Acute renal failure superimposed on chronic kidney disease, unspecified acute renal failure type, unspecified CKD stage  N17.9 584.9    N18.9 585.9     Patient Active Problem List   Diagnosis    Compression fracture    Lumbar degenerative disc disease    Type 2 diabetes mellitus with hyperglycemia, with long-term current use of insulin    Hyperlipidemia    Benign essential hypertension    Primary hypothyroidism    Neuropathy    Osteoporosis    Proteinuria    Tobacco abuse    Diabetic eye exam    Generalized weakness    Spinal stenosis    Scoliosis    Arthritis    VBI (vertebrobasilar insufficiency)    Orthostatic hypotension    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    Severe hypothyroidism    Noncompliance with medication regimen    Vitamin D deficiency disease    Altered mental status    Tremor    Seizure    Stage 3a chronic kidney disease    Thoracic degenerative disc disease    Metabolic encephalopathy    VIPUL (acute kidney injury)    Hyperglycemia    Type II diabetes mellitus, uncontrolled    Lower abdominal pain    Transaminitis    COVID-19 virus detected    UTI (urinary tract infection)    Emphysematous cystitis    Choledocholithiasis    Hypokalemia    Hypoxia    Generalized abdominal pain    History of Clostridium difficile infection    History of ERCP    Acute UTI (urinary tract  infection)    Hypomagnesemia    Burning pain    Anxiety disorder    Neuropathic pain    Intertrigo    Weakness of both lower extremities    Accelerated hypertension    Acute cystitis without hematuria    Altered mental status, unspecified altered mental status type    Left lower lobe pneumonia    Acute pain of left foot    Cellulitis and abscess of left lower extremity    Hypertensive kidney disease with stage 3a chronic kidney disease    Bilateral leg pain    Peripheral edema    Primary malignant neuroendocrine tumor of pancreas    Encounter for long-term (current) use of high-risk medication    Diabetic foot ulcer     Past Medical History:   Diagnosis Date    Acute metabolic encephalopathy 6/24/2022    Anxiety     Arthritis     Benign essential hypertension 08/20/2014    Bleeding disorder     Depression     Diabetes     Diabetes mellitus, type 2     Disc degeneration, lumbar     Headache, tension-type     Hyperlipidemia     Hypothyroidism     Neuropathy     Osteoporosis 09/09/2015    Peripheral neuropathy     Rotator cuff tear, left     Scoliosis     Shoulder pain     LEFT, TORN ROTATOR CUFF S/P FALL    Spinal stenosis      Past Surgical History:   Procedure Laterality Date    APPENDECTOMY      BILATERAL BREAST REDUCTION Bilateral 08/2015    CATARACT EXTRACTION  03/2015    CHOLECYSTECTOMY WITH INTRAOPERATIVE CHOLANGIOGRAM N/A 3/27/2022    Procedure: CHOLECYSTECTOMY LAPAROSCOPIC INTRAOPERATIVE CHOLANGIOGRAM;  Surgeon: Aiyana Hill MD;  Location: [facility] MAIN OR;  Service: General;  Laterality: N/A;    COLONOSCOPY  06/05/2015    WNL    ERCP N/A 2/25/2022    Procedure: ENDOSCOPIC RETROGRADE CHOLANGIOPANCREATOGRAPHY with sphincterotomy and balloon sweep;  Surgeon: Chilo Wilhelm MD;  Location: [facility] ENDOSCOPY;  Service: Gastroenterology;  Laterality: N/A;  PRE/POST - CBD stones    ERCP N/A 3/28/2022    Procedure: ENDOSCOPIC RETROGRADE CHOLANGIOPANCREATOGRAPHY WITH SPHINCTEROTOMY AND BALLOON SWEEP;  Surgeon:  Chilo Wilhelm MD;  Location: [facility] ENDOSCOPY;  Service: Gastroenterology;  Laterality: N/A;  PRE: COMMON DUCT STONE  POST: COMMON DUCT STONE    KYPHOPLASTY      REDUCTION MAMMAPLASTY      TONSILLECTOMY        General Information       Row Name 12/26/23 1507          Physical Therapy Time and Intention    Document Type evaluation  -LW     Mode of Treatment physical therapy  -       Row Name 12/26/23 1507          General Information    Patient Profile Reviewed yes  -LW     Prior Level of Function independent:  w rwx  -LW     Existing Precautions/Restrictions fall  -LW     Barriers to Rehab none identified  -       Row Name 12/26/23 1507          Living Environment    People in Home child(beatriz), adult  son  -       Row Name 12/26/23 1507          Home Main Entrance    Number of Stairs, Main Entrance none  -LW       Row Name 12/26/23 1507          Stairs Within Home, Primary    Number of Stairs, Within Home, Primary none  -LW       Row Name 12/26/23 1507          Cognition    Orientation Status (Cognition) oriented x 3  -       Row Name 12/26/23 1507          Safety Issues, Functional Mobility    Impairments Affecting Function (Mobility) balance;endurance/activity tolerance;pain;strength  -               User Key  (r) = Recorded By, (t) = Taken By, (c) = Cosigned By      Initials Name Provider Type    LW Ileana Da Silva PT Physical Therapist                   Mobility       Row Name 12/26/23 1510          Bed Mobility    Bed Mobility supine-sit  -LW     Supine-Sit Tate (Bed Mobility) standby assist;contact guard  -     Assistive Device (Bed Mobility) bed rails;head of bed elevated  -       Row Name 12/26/23 1510          Bed-Chair Transfer    Bed-Chair Tate (Transfers) minimum assist (75% patient effort)  -     Assistive Device (Bed-Chair Transfers) walker, front-wheeled  -       Row Name 12/26/23 1510          Sit-Stand Transfer    Sit-Stand Tate (Transfers) moderate  assist (50% patient effort)  -LW     Assistive Device (Sit-Stand Transfers) walker, front-wheeled  -LW     Comment, (Sit-Stand Transfer) 3 attmepts to stand, last attempt from elevated bed  -LW       Row Name 12/26/23 1510          Gait/Stairs (Locomotion)    [place] Level (Gait) minimum assist (75% patient effort)  -LW     Assistive Device (Gait) walker, front-wheeled  -LW     Distance in Feet (Gait) 3  -LW     Deviations/Abnormal Patterns (Gait) base of support, wide;gait speed decreased;stride length decreased;weight shifting decreased;antalgic  -LW     Bilateral Gait Deviations forward flexed posture  -LW     [place] Level (Stairs) not tested  -LW               User Key  (r) = Recorded By, (t) = Taken By, (c) = Cosigned By      Initials Name Provider Type    LW Ileana Da Silva PT Physical Therapist                   Obj/Interventions       Row Name 12/26/23 1511          Range of Motion Comprehensive    General Range of Motion bilateral lower extremity ROM WFL  -       Row Name 12/26/23 1511          Strength Comprehensive (MMT)    General Manual Muscle Testing (MMT) Assessment lower extremity strength deficits identified  -LW     Comment, General Manual Muscle Testing (MMT) Assessment generalized weakness bilat LE, 4-/5  -LW       Row Name 12/26/23 1511          Balance    Balance Assessment sitting static balance;sitting dynamic balance;standing static balance;sit to stand dynamic balance;standing dynamic balance  -LW     Static Sitting Balance standby assist  -LW     Dynamic Sitting Balance standby assist  -LW     Position, Sitting Balance sitting edge of bed  -LW     Sit to Stand Dynamic Balance moderate assist  -LW     Static Standing Balance minimal assist  -LW     Dynamic Standing Balance minimal assist  -LW     Position/Device Used, Standing Balance walker, front-wheeled  -LW     Balance Interventions sitting;standing;sit to stand  -LW               User Key  (r) = Recorded By, (t) = Taken By,  (c) = Cosigned By      Initials Name Provider Type    Ileana Kaufman, PT Physical Therapist                   Goals/Plan       Row Name 12/26/23 1516          Bed Mobility Goal 1 (PT)    Activity/Assistive Device (Bed Mobility Goal 1, PT) bed mobility activities, all  -LW     [place] Level/Cues Needed (Bed Mobility Goal 1, PT) independent  -LW     Time Frame (Bed Mobility Goal 1, PT) 10 days  -LW       Row Name 12/26/23 1516          Transfer Goal 1 (PT)    Activity/Assistive Device (Transfer Goal 1, PT) sit-to-stand/stand-to-sit;bed-to-chair/chair-to-bed;walker, rolling  -LW     [place] Level/Cues Needed (Transfer Goal 1, PT) modified independence  -LW     Time Frame (Transfer Goal 1, PT) 10 days  -LW       Row Name 12/26/23 1516          Gait Training Goal 1 (PT)    Activity/Assistive Device (Gait Training Goal 1, PT) gait (walking locomotion);walker, rolling  -LW     [place] Level (Gait Training Goal 1, PT) modified independence  -LW     Distance (Gait Training Goal 1, PT) 80'  -LW     Time Frame (Gait Training Goal 1, PT) 10 days  -LW       Row Name 12/26/23 1516          Therapy Assessment/Plan (PT)    Planned Therapy Interventions (PT) balance training;bed mobility training;patient/family education;gait training;home exercise program;strengthening;transfer training  -LW               User Key  (r) = Recorded By, (t) = Taken By, (c) = Cosigned By      Initials Name Provider Type    Ileana Kaufman PT Physical Therapist                   Clinical Impression       Row Name 12/26/23 1512          Pain    Pretreatment Pain Rating 3/10  -LW     Posttreatment Pain Rating 5/10  -LW     Pain Location - Side/Orientation Left  -LW     Pain Location - foot  -LW     Pre/Posttreatment Pain Comment increased pain with WBing on foot with wound  -LW     Pain Intervention(s) Rest;Repositioned  -LW       Row Name 12/26/23 1512          Plan of Care Review    Plan of Care Reviewed With patient  -LW     Outcome  Evaluation Pt is an 84 y/o female admitted to [facility] for a diabetic ulcer on her L foot. Prior to admission she was Gm with all mobility using a rwx and lives with her son in a 1 story condo with 0 CYRUS. Today she presented with mobility below baseline, decreased tolerance to activity, and generalized LE weakness. She was SBA for bed mobility. She stood on the 3rd attempt with modA and rwx from an elevated bed. She took 3 steps to the chair with Jayson. Her activity tolerance was decreased d/t increasing pain in her foot. Patient will benefit from continued skilled PT addressing limitations in functional mobility to maximize safety and independence. Therapy recommendations include SNF at d/c and will continue to be assessed as pt progresses.  -       Row Name 12/26/23 1512          Therapy Assessment/Plan (PT)    Patient/Family Therapy Goals Statement (PT) To go home.  -LW     Rehab Potential (PT) good, to achieve stated therapy goals  -LW     Criteria for Skilled Interventions Met (PT) yes  -LW     Therapy Frequency (PT) 5 times/wk  -       Row Name 12/26/23 1512          Positioning and Restraints    Pre-Treatment Position in bed  -LW     Post Treatment Position chair  -LW     In Chair reclined;call light within reach;encouraged to call for assist;notified nsg  no alarm on entry  -LW               User Key  (r) = Recorded By, (t) = Taken By, (c) = Cosigned By      Initials Name Provider Type    Ileana Kaufman, PT Physical Therapist                   Outcome Measures       Row Name 12/26/23 1517 12/26/23 0900       How much help from another person do you currently need...    Turning from your back to your side while in flat bed without using bedrails? 4  -LW 4  -MC    Moving from lying on back to sitting on the side of a flat bed without bedrails? 3  -LW 3  -MC    Moving to and from a bed to a chair (including a wheelchair)? 2  -LW 2  -MC    Standing up from a chair using your arms (e.g., wheelchair, bedside  chair)? 2  -LW 2  -MC    Climbing 3-5 steps with a railing? 1  -LW 1  -MC    To walk in hospital room? 2  -LW 2  -MC    AM-PAC 6 Clicks Score (PT) 14  -LW 14  -    Highest Level of Mobility Goal 4 --> Transfer to chair/commode  -LW 4 --> Transfer to chair/commode  -MC      Row Name 12/26/23 1517          Functional Assessment    Outcome Measure Options AM-PAC 6 Clicks Basic Mobility (PT)  -               User Key  (r) = Recorded By, (t) = Taken By, (c) = Cosigned By      Initials Name Provider Type     Faustina Mesa, RN Registered Nurse    Ileana Kaufman, PT Physical Therapist                                 Physical Therapy Education       Title: PT OT SLP Therapies (In Progress)       Topic: Physical Therapy (In Progress)       Point: Mobility training (Done)       Learning Progress Summary             Patient Acceptance, E, VU by  at 12/26/2023 1518                         Point: Home exercise program (Not Started)       Learner Progress:  Not documented in this visit.              Point: Body mechanics (Done)       Learning Progress Summary             Patient Acceptance, E, VU by  at 12/26/2023 1518                         Point: Precautions (Done)       Learning Progress Summary             Patient Acceptance, E, VU by  at 12/26/2023 1518                                         User Key       Initials Effective Dates Name Provider Type [facility] 05/08/23 -  Ileana Da Silva, MELODIE Physical Therapist PT                  PT Recommendation and Plan  Planned Therapy Interventions (PT): balance training, bed mobility training, patient/family education, gait training, home exercise program, strengthening, transfer training  Plan of Care Reviewed With: patient  Outcome Evaluation: Pt is an 82 y/o female admitted to [facility] for a diabetic ulcer on her L foot. Prior to admission she was Gm with all mobility using a rwx and lives with her son in a 1 story condo with 0 CYRUS. Today she presented with mobility  below baseline, decreased tolerance to activity, and generalized LE weakness. She was SBA for bed mobility. She stood on the 3rd attempt with modA and rwx from an elevated bed. She took 3 steps to the chair with Jayson. Her activity tolerance was decreased d/t increasing pain in her foot. Patient will benefit from continued skilled PT addressing limitations in functional mobility to maximize safety and independence. Therapy recommendations include SNF at d/c and will continue to be assessed as pt progresses.     Time Calculation:         PT Charges       Row Name 12/26/23 1518             Time Calculation    Start Time 1334  -LW      Stop Time 1402  -LW      Time Calculation (min) 28 min  -LW      PT Received On 12/26/23  -LW      PT - Next Appointment 12/27/23  -LW      PT Goal Re-Cert Due Date 01/05/24  -LW         Time Calculation- PT    Total Timed Code Minutes- PT 23 minute(s)  -LW         Timed Charges    82239 - PT Therapeutic Activity Minutes 23  -LW         Total Minutes    Timed Charges Total Minutes 23  -LW       Total Minutes 23  -LW                User Key  (r) = Recorded By, (t) = Taken By, (c) = Cosigned By      Initials Name Provider Type    LW Ileana Da Silva, PT Physical Therapist                  Therapy Charges for Today       Code Description Service Date Service Provider Modifiers Qty    14475952033 HC PT THERAPEUTIC ACT EA 15 MIN 12/26/2023 Ileana Da Silva, PT GP 2    14702953965 HC PT EVAL MOD COMPLEXITY 3 12/26/2023 Ileana Da Silva, PT GP 1            PT G-Codes  Outcome Measure Options: AM-PAC 6 Clicks Basic Mobility (PT)  AM-PAC 6 Clicks Score (PT): 14  PT Discharge Summary  Anticipated Discharge Disposition (PT): skilled nursing facility    Ileana Da Silva PT  12/26/2023

## 2023-12-27 LAB
ANION GAP SERPL CALCULATED.3IONS-SCNC: 10 MMOL/L (ref 5–15)
BUN SERPL-MCNC: 25 MG/DL (ref 8–23)
BUN/CREAT SERPL: 22.1 (ref 7–25)
CALCIUM SPEC-SCNC: 8.5 MG/DL (ref 8.6–10.5)
CHLORIDE SERPL-SCNC: 102 MMOL/L (ref 98–107)
CO2 SERPL-SCNC: 21 MMOL/L (ref 22–29)
CREAT SERPL-MCNC: 1.13 MG/DL (ref 0.57–1)
DEPRECATED RDW RBC AUTO: 44.2 FL (ref 37–54)
EGFRCR SERPLBLD CKD-EPI 2021: 48.4 ML/MIN/1.73
ERYTHROCYTE [DISTWIDTH] IN BLOOD BY AUTOMATED COUNT: 14.7 % (ref 12.3–15.4)
GLUCOSE BLDC GLUCOMTR-MCNC: 234 MG/DL (ref 70–130)
GLUCOSE BLDC GLUCOMTR-MCNC: 258 MG/DL (ref 70–130)
GLUCOSE BLDC GLUCOMTR-MCNC: 284 MG/DL (ref 70–130)
GLUCOSE BLDC GLUCOMTR-MCNC: 315 MG/DL (ref 70–130)
GLUCOSE SERPL-MCNC: 270 MG/DL (ref 65–99)
HCT VFR BLD AUTO: 32 % (ref 34–46.6)
HGB BLD-MCNC: 10.7 G/DL (ref 12–15.9)
MCH RBC QN AUTO: 27.9 PG (ref 26.6–33)
MCHC RBC AUTO-ENTMCNC: 33.4 G/DL (ref 31.5–35.7)
MCV RBC AUTO: 83.6 FL (ref 79–97)
PLATELET # BLD AUTO: 273 10*3/MM3 (ref 140–450)
PMV BLD AUTO: 9.4 FL (ref 6–12)
POTASSIUM SERPL-SCNC: 3.8 MMOL/L (ref 3.5–5.2)
RBC # BLD AUTO: 3.83 10*6/MM3 (ref 3.77–5.28)
SODIUM SERPL-SCNC: 133 MMOL/L (ref 136–145)
WBC NRBC COR # BLD AUTO: 8.28 10*3/MM3 (ref 3.4–10.8)

## 2023-12-27 PROCEDURE — 85027 COMPLETE CBC AUTOMATED: CPT | Performed by: STUDENT IN AN ORGANIZED HEALTH CARE EDUCATION/TRAINING PROGRAM

## 2023-12-27 PROCEDURE — 87077 CULTURE AEROBIC IDENTIFY: CPT | Performed by: PODIATRIST

## 2023-12-27 PROCEDURE — 80048 BASIC METABOLIC PNL TOTAL CA: CPT | Performed by: STUDENT IN AN ORGANIZED HEALTH CARE EDUCATION/TRAINING PROGRAM

## 2023-12-27 PROCEDURE — 87070 CULTURE OTHR SPECIMN AEROBIC: CPT | Performed by: PODIATRIST

## 2023-12-27 PROCEDURE — 87205 SMEAR GRAM STAIN: CPT | Performed by: PODIATRIST

## 2023-12-27 PROCEDURE — 25010000002 VANCOMYCIN 10 G RECONSTITUTED SOLUTION: Performed by: STUDENT IN AN ORGANIZED HEALTH CARE EDUCATION/TRAINING PROGRAM

## 2023-12-27 PROCEDURE — 97530 THERAPEUTIC ACTIVITIES: CPT

## 2023-12-27 PROCEDURE — 87102 FUNGUS ISOLATION CULTURE: CPT | Performed by: STUDENT IN AN ORGANIZED HEALTH CARE EDUCATION/TRAINING PROGRAM

## 2023-12-27 PROCEDURE — 63710000001 INSULIN GLARGINE PER 5 UNITS: Performed by: STUDENT IN AN ORGANIZED HEALTH CARE EDUCATION/TRAINING PROGRAM

## 2023-12-27 PROCEDURE — 82948 REAGENT STRIP/BLOOD GLUCOSE: CPT

## 2023-12-27 PROCEDURE — 25810000003 SODIUM CHLORIDE 0.9 % SOLUTION: Performed by: STUDENT IN AN ORGANIZED HEALTH CARE EDUCATION/TRAINING PROGRAM

## 2023-12-27 PROCEDURE — 25010000002 CEFEPIME PER 500 MG: Performed by: NURSE PRACTITIONER

## 2023-12-27 PROCEDURE — 63710000001 INSULIN LISPRO (HUMAN) PER 5 UNITS: Performed by: STUDENT IN AN ORGANIZED HEALTH CARE EDUCATION/TRAINING PROGRAM

## 2023-12-27 PROCEDURE — 25810000003 SODIUM CHLORIDE 0.9 % SOLUTION: Performed by: NURSE PRACTITIONER

## 2023-12-27 PROCEDURE — 87186 SC STD MICRODIL/AGAR DIL: CPT | Performed by: PODIATRIST

## 2023-12-27 RX ADMIN — Medication 10 ML: at 21:20

## 2023-12-27 RX ADMIN — HYDROCODONE BITARTRATE AND ACETAMINOPHEN 1 TABLET: 10; 325 TABLET ORAL at 21:19

## 2023-12-27 RX ADMIN — LEVOTHYROXINE SODIUM 112 MCG: 112 TABLET ORAL at 06:03

## 2023-12-27 RX ADMIN — FUROSEMIDE 20 MG: 20 TABLET ORAL at 08:26

## 2023-12-27 RX ADMIN — LISINOPRIL 40 MG: 40 TABLET ORAL at 08:26

## 2023-12-27 RX ADMIN — PREGABALIN 50 MG: 50 CAPSULE ORAL at 16:20

## 2023-12-27 RX ADMIN — PREGABALIN 50 MG: 50 CAPSULE ORAL at 08:25

## 2023-12-27 RX ADMIN — CEFEPIME HYDROCHLORIDE 1000 MG: 1 INJECTION, POWDER, FOR SOLUTION INTRAMUSCULAR; INTRAVENOUS at 08:24

## 2023-12-27 RX ADMIN — INSULIN LISPRO 4 UNITS: 100 INJECTION, SOLUTION INTRAVENOUS; SUBCUTANEOUS at 21:19

## 2023-12-27 RX ADMIN — SODIUM CHLORIDE 75 ML/HR: 9 INJECTION, SOLUTION INTRAVENOUS at 16:20

## 2023-12-27 RX ADMIN — INSULIN LISPRO 4 UNITS: 100 INJECTION, SOLUTION INTRAVENOUS; SUBCUTANEOUS at 08:25

## 2023-12-27 RX ADMIN — INSULIN GLARGINE 35 UNITS: 100 INJECTION, SOLUTION SUBCUTANEOUS at 08:25

## 2023-12-27 RX ADMIN — PANTOPRAZOLE SODIUM 40 MG: 40 TABLET, DELAYED RELEASE ORAL at 08:25

## 2023-12-27 RX ADMIN — PANTOPRAZOLE SODIUM 40 MG: 40 TABLET, DELAYED RELEASE ORAL at 17:23

## 2023-12-27 RX ADMIN — INSULIN LISPRO 5 UNITS: 100 INJECTION, SOLUTION INTRAVENOUS; SUBCUTANEOUS at 12:24

## 2023-12-27 RX ADMIN — VANCOMYCIN HYDROCHLORIDE 1250 MG: 10 INJECTION, POWDER, LYOPHILIZED, FOR SOLUTION INTRAVENOUS at 12:24

## 2023-12-27 RX ADMIN — AMLODIPINE BESYLATE 5 MG: 5 TABLET ORAL at 08:26

## 2023-12-27 RX ADMIN — INSULIN LISPRO 3 UNITS: 100 INJECTION, SOLUTION INTRAVENOUS; SUBCUTANEOUS at 17:23

## 2023-12-27 RX ADMIN — DULOXETINE HYDROCHLORIDE 30 MG: 30 CAPSULE, DELAYED RELEASE ORAL at 08:26

## 2023-12-27 RX ADMIN — HYDROCODONE BITARTRATE AND ACETAMINOPHEN 1 TABLET: 10; 325 TABLET ORAL at 16:20

## 2023-12-27 RX ADMIN — Medication 10 ML: at 08:26

## 2023-12-27 RX ADMIN — HYDROCODONE BITARTRATE AND ACETAMINOPHEN 1 TABLET: 10; 325 TABLET ORAL at 08:25

## 2023-12-27 RX ADMIN — CHLORTHALIDONE 25 MG: 25 TABLET ORAL at 08:26

## 2023-12-27 RX ADMIN — CEFEPIME HYDROCHLORIDE 1000 MG: 1 INJECTION, POWDER, FOR SOLUTION INTRAMUSCULAR; INTRAVENOUS at 21:18

## 2023-12-27 RX ADMIN — BISOPROLOL FUMARATE 5 MG: 5 TABLET, FILM COATED ORAL at 08:26

## 2023-12-27 RX ADMIN — TERAZOSIN HYDROCHLORIDE 1 MG: 1 CAPSULE ORAL at 21:19

## 2023-12-27 RX ADMIN — PREGABALIN 50 MG: 50 CAPSULE ORAL at 21:19

## 2023-12-27 NOTE — PROGRESS NOTES
CC:  Eldon Cota is here today for   Chief Complaint   Patient presents with    Office Visit     DOS 12- Left total shoulder arthroplasty     Suture Removal     Staples removed, verbal order. SR PA. Steri-strips applied.      .    Referring MD:   Michael Gillespie MD  PCP:  Lesly Cordoba MD  Medication verified, no changes  Patient would like communication of their results via:    Cell Phone:   Telephone Information:   Mobile [phone]     Okay to leave a message containing results? No  Tobacco history: Verified     Continued Stay Note  [facility]     Patient Name: Halie Gudiry  MRN: [id number]  Today's Date: 7/27/2023    Admit Date: 7/21/2023    Plan: Charli Alfonso   Discharge Plan       Row Name 07/27/23 0849       Plan    Plan Charli Alfonso    Plan Comments Msg sent to Mame with [employer] to ask about how long pre-cert good for.                   Discharge Codes    No documentation.                 Expected Discharge Date and Time       Expected Discharge Date Expected Discharge Time    Jul 26, 2023               Vannessa Mcdowell RN

## 2023-12-27 NOTE — PLAN OF CARE
Goal Outcome Evaluation:  Patient alert and oriented v/s stable eduction was provided call light at reach patient want to clarify lipitor order with the provider in the morning we will continue monitoring

## 2023-12-27 NOTE — PLAN OF CARE
"Goal Outcome Evaluation:  Plan of Care Reviewed With: patient         Pt s/p incision and drainage of left diabetic foot wound at bedside. WBAT per podiatry with post op shoe per his note. Shoe applied. Pt able to move to eob sba and transfer bed to chair with mod/min asst. Pt very impulsive today letting go of rwx and reaching for recliner before in full stand position. Pt reports 5/10 today left foot \" throbbing.\" Fall risk at this time. Recommend coordinate pain medication.             "

## 2023-12-27 NOTE — PROGRESS NOTES
Name: Halie Guidry ADMIT: 2023   : 1940  PCP: Napoleon Mead MD    MRN: [id number] LOS: 3 days   AGE/SEX: 83 y.o. female  ROOM: [facility]     Subjective   Subjective   Patient resting comfortably in bed. No complaints.  Tolerated diet.    Review of Systems   Constitutional:  Negative for chills and fever.   Respiratory:  Negative for cough and shortness of breath.    Cardiovascular:  Negative for chest pain and leg swelling.   Gastrointestinal:  Negative for abdominal pain, diarrhea and nausea.     As above     Objective   Objective   Vital Signs  Temp:  [97.5 °F (36.4 °C)-98.2 °F (36.8 °C)] 97.5 °F (36.4 °C)  Heart Rate:  [59-65] 62  Resp:  [16-18] 17  BP: (144-169)/(52-70) 162/70  SpO2:  [92 %-97 %] 92 %  on   ;   Device (Oxygen Therapy): room air  Body mass index is 31.88 kg/m².  Physical Exam  Constitutional:       General: She is not in acute distress.     Appearance: She is not ill-appearing.   Cardiovascular:      Rate and Rhythm: Normal rate and regular rhythm.   Pulmonary:      Effort: Pulmonary effort is normal. No respiratory distress.   Abdominal:      General: Abdomen is flat. There is no distension.      Tenderness: There is no abdominal tenderness.   Musculoskeletal:         General: No swelling or deformity. Normal range of motion.   Skin:     General: Skin is warm and dry.      Comments: Ulcer on the plantar left great toe.  Evidence of bilateral redness consistent with bilateral venous stasis dermatitis.   Neurological:      General: No focal deficit present.      Mental Status: She is alert. Mental status is at baseline.         Results Review     I reviewed the patient's new clinical results.  Results from last 7 days   Lab Units 23  0705 23  0922 23   WBC 10*3/mm3 8.28 8.32 11.14*   HEMOGLOBIN g/dL 10.7* 10.8* 11.4*   PLATELETS 10*3/mm3 273 303 360     Results from last 7 days   Lab Units 23  0705 23  0922 23  1105 23    SODIUM mmol/L 133* 137  --  135*   POTASSIUM mmol/L 3.8 4.3  --  3.9   CHLORIDE mmol/L 102 102  --  98   CO2 mmol/L 21.0* 21.0*  --  23.2   BUN mg/dL 25* 24*  --  34*   CREATININE mg/dL 1.13* 1.19* 1.26* 1.40*   GLUCOSE mg/dL 270* 279*  --  342*   Estimated Creatinine Clearance: 45.5 mL/min (A) (by C-G formula based on SCr of 1.13 mg/dL (H)).  Results from last 7 days   Lab Units 12/24/23 2026   ALBUMIN g/dL 3.6   BILIRUBIN mg/dL 0.3   ALK PHOS U/L 114   AST (SGOT) U/L 12   ALT (SGPT) U/L 7     Results from last 7 days   Lab Units 12/27/23  0705 12/26/23  0922 12/24/23 2026   CALCIUM mg/dL 8.5* 8.8 8.9   ALBUMIN g/dL  --   --  3.6     Results from last 7 days   Lab Units 12/25/23  0554 12/24/23  2258 12/24/23 2026   PROCALCITONIN ng/mL  --   --  0.07   LACTATE mmol/L 1.8 2.6* 2.2*     COVID19   Date Value Ref Range Status   05/13/2023 Not Detected Not Detected - Ref. Range Final   11/03/2022 Not Detected Not Detected - Ref. Range Final     Hemoglobin A1C   Date/Time Value Ref Range Status   12/24/2023 2026 8.20 (H) 4.80 - 5.60 % Final     Glucose   Date/Time Value Ref Range Status   12/27/2023 1132 315 (H) 70 - 130 mg/dL Final   12/27/2023 0728 258 (H) 70 - 130 mg/dL Final   12/26/2023 1956 330 (H) 70 - 130 mg/dL Final   12/26/2023 1716 233 (H) 70 - 130 mg/dL Final   12/26/2023 1224 369 (H) 70 - 130 mg/dL Final   12/26/2023 0823 243 (H) 70 - 130 mg/dL Final   12/25/2023 2144 277 (H) 70 - 130 mg/dL Final       MRI Foot Left Without Contrast  Narrative: Patient: TABATHA CARTER  Time Out: 16:46  Exam(s): MRI LEFT FOOT Without Contrast     EXAM:  MR Left Lower Extremity Without Intravenous Contrast, Foot    CLINICAL HISTORY:  Reason for exam: diabetic ulcer.    TECHNIQUE:  Multiplanar magnetic resonance images of the left foot without   intravenous contrast.    COMPARISON:  Left foot radiographs 12 24 23    FINDINGS:  Skin markers in place along the plantar aspect of the foot at the level   of the interspace  between the distal first and second metatarsal shafts.    No focal soft tissue abnormalities noted in this location.    There is a soft tissue ulcer along the plantar-medial aspect of the first   metatarsal head.    There is generalized subcutaneous edema of the foot which may represent   cellulitis.  No organized fluid collection is identified to suggest   abscess.    There are no characteristic marrow signal changes to suggest acute   osteomyelitis.  There is no imaging evidence of septic arthritis.    Osteoarthritic changes noted at the first MTP joint.  There is no   fracture or dislocation.    IMPRESSION:     1.  Soft tissue ulcer at the plantar-medial aspect of the first MTP joint.    Generalized subcutaneous edema cellulitis.  2.  No evidence of acute osteomyelitis.  Impression: Electronically signed by Haley Conley M.D. on 12-25-23 at 1646    I reviewed the patient's daily medications.  Scheduled Medications  amLODIPine, 5 mg, Oral, Daily  atorvastatin, 80 mg, Oral, Nightly  bisoprolol, 5 mg, Oral, Daily  cefepime, 1,000 mg, Intravenous, Q12H  chlorthalidone, 25 mg, Oral, Daily  DULoxetine, 30 mg, Oral, Daily  furosemide, 20 mg, Oral, Daily  HYDROcodone-acetaminophen, 1 tablet, Oral, TID  [START ON 12/28/2023] insulin glargine, 45 Units, Subcutaneous, Daily  insulin lispro, 2-7 Units, Subcutaneous, 4x Daily AC & at Bedtime  levothyroxine, 112 mcg, Oral, Q AM  lisinopril, 40 mg, Oral, Daily  pantoprazole, 40 mg, Oral, BID AC  pregabalin, 50 mg, Oral, TID  sodium chloride, 10 mL, Intravenous, Q12H  terazosin, 1 mg, Oral, Nightly  vancomycin, 1,250 mg, Intravenous, Q24H    Infusions  Pharmacy to dose vancomycin,   sodium chloride, 75 mL/hr, Last Rate: 75 mL/hr (12/26/23 2032)    Diet  Diet: Diabetic Diets; Consistent Carbohydrate; Texture: Regular Texture (IDDSI 7); Fluid Consistency: Thin (IDDSI 0)         I have personally reviewed:  [x]  Laboratory   [x]  Microbiology   [x]  Radiology   []  EKG/Telemetry    []  Cardiology/Vascular   []  Pathology   []  Records     Assessment/Plan     Active Hospital Problems    Diagnosis  POA    **Diabetic foot ulcer [E11.621, L97.509]  Yes    Primary malignant neuroendocrine tumor of pancreas [C7A.8]  Yes    Anxiety disorder [F41.9]  Yes    Type 2 diabetes mellitus with hyperglycemia, with long-term current use of insulin [E11.65, Z79.4]  Not Applicable    Primary hypothyroidism [E03.9]  Yes    Hyperlipidemia [E78.5]  Yes    Benign essential hypertension [I10]  Yes    Stage 3a chronic kidney disease [N18.31]  Yes      Resolved Hospital Problems   No resolved problems to display.       83 y.o. female admitted with Diabetic foot ulcer.      Acute on chronic diabetic foot ulcer  Chronic venous stasis dermatitis  -Cefepime and vancomycin  -Wound culture from admission growing multidrug-resistant Enterobacter as well as MSSA  -MRI foot soft tissue ulcer of first MTP joint, no osteomyelitis  -Wound care consulted-recommend podiatry consult.  -Podiatry consulted, performed bedside I&D and sent for culture.  Weightbearing as tolerated in postop shoe.  -ID consulted, appreciate recommendations     Hypertension-continue home medications     Diabetes type 2 on chronic insulin, A1c 8.2%  -On 45 units of glargine at home  -increase glargine to 45 unitis sliding scale insulin, titrate as needed     Hypothyroidism-continue home Synthroid     Neuroendocrine tumor of the pancreas-follows oncology as an outpatient.  Currently getting octreotide injections monthly.     CKD 3 stage IIIa, stable, baseline    SCDs for DVT prophylaxis.  Full code.  Discussed with patient and nursing staff.  Anticipate discharge home with HH vs SNU facility when cleared by consultants.  Recommended to SNF to patient, however she has declined.    Expected Discharge Date: 12/28/2023; Expected Discharge Time:       Cedrick Mendoza MD  [facility]  12/27/23  15:38 EST

## 2023-12-27 NOTE — THERAPY TREATMENT NOTE
Patient Name: Halie Guidry  : 1940    MRN: [id number]                              Today's Date: 2023       Admit Date: 2023    Visit Dx:     ICD-10-CM ICD-9-CM   1. Diabetic ulcer of left foot associated with type 2 diabetes mellitus, unspecified part of foot, unspecified ulcer stage  E11.621 250.80    L97.529 707.15   2. Type 2 diabetes mellitus with hyperglycemia, unspecified whether long term insulin use  E11.65 250.00   3. Hypertension, unspecified type  I10 401.9   4. Hyperlipidemia, unspecified hyperlipidemia type  E78.5 272.4   5. Hypothyroidism, unspecified type  E03.9 244.9   6. Acute renal failure superimposed on chronic kidney disease, unspecified acute renal failure type, unspecified CKD stage  N17.9 584.9    N18.9 585.9     Patient Active Problem List   Diagnosis    Compression fracture    Lumbar degenerative disc disease    Type 2 diabetes mellitus with hyperglycemia, with long-term current use of insulin    Hyperlipidemia    Benign essential hypertension    Primary hypothyroidism    Neuropathy    Osteoporosis    Proteinuria    Tobacco abuse    Diabetic eye exam    Generalized weakness    Spinal stenosis    Scoliosis    Arthritis    VBI (vertebrobasilar insufficiency)    Orthostatic hypotension    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    Severe hypothyroidism    Noncompliance with medication regimen    Vitamin D deficiency disease    Altered mental status    Tremor    Seizure    Stage 3a chronic kidney disease    Thoracic degenerative disc disease    Metabolic encephalopathy    VIPUL (acute kidney injury)    Hyperglycemia    Type II diabetes mellitus, uncontrolled    Lower abdominal pain    Transaminitis    COVID-19 virus detected    UTI (urinary tract infection)    Emphysematous cystitis    Choledocholithiasis    Hypokalemia    Hypoxia    Generalized abdominal pain    History of Clostridium difficile infection    History of ERCP    Acute UTI (urinary tract  infection)    Hypomagnesemia    Burning pain    Anxiety disorder    Neuropathic pain    Intertrigo    Weakness of both lower extremities    Accelerated hypertension    Acute cystitis without hematuria    Altered mental status, unspecified altered mental status type    Left lower lobe pneumonia    Acute pain of left foot    Cellulitis and abscess of left lower extremity    Hypertensive kidney disease with stage 3a chronic kidney disease    Bilateral leg pain    Peripheral edema    Primary malignant neuroendocrine tumor of pancreas    Encounter for long-term (current) use of high-risk medication    Diabetic foot ulcer     Past Medical History:   Diagnosis Date    Acute metabolic encephalopathy 6/24/2022    Anxiety     Arthritis     Benign essential hypertension 08/20/2014    Bleeding disorder     Depression     Diabetes     Diabetes mellitus, type 2     Disc degeneration, lumbar     Headache, tension-type     Hyperlipidemia     Hypothyroidism     Neuropathy     Osteoporosis 09/09/2015    Peripheral neuropathy     Rotator cuff tear, left     Scoliosis     Shoulder pain     LEFT, TORN ROTATOR CUFF S/P FALL    Spinal stenosis      Past Surgical History:   Procedure Laterality Date    APPENDECTOMY      BILATERAL BREAST REDUCTION Bilateral 08/2015    CATARACT EXTRACTION  03/2015    CHOLECYSTECTOMY WITH INTRAOPERATIVE CHOLANGIOGRAM N/A 3/27/2022    Procedure: CHOLECYSTECTOMY LAPAROSCOPIC INTRAOPERATIVE CHOLANGIOGRAM;  Surgeon: Aiyana Hill MD;  Location: [facility] MAIN OR;  Service: General;  Laterality: N/A;    COLONOSCOPY  06/05/2015    WNL    ERCP N/A 2/25/2022    Procedure: ENDOSCOPIC RETROGRADE CHOLANGIOPANCREATOGRAPHY with sphincterotomy and balloon sweep;  Surgeon: Chilo Wilhelm MD;  Location: [facility] ENDOSCOPY;  Service: Gastroenterology;  Laterality: N/A;  PRE/POST - CBD stones    ERCP N/A 3/28/2022    Procedure: ENDOSCOPIC RETROGRADE CHOLANGIOPANCREATOGRAPHY WITH SPHINCTEROTOMY AND BALLOON SWEEP;  Surgeon:  Chilo Wilhelm MD;  Location: [facility] ENDOSCOPY;  Service: Gastroenterology;  Laterality: N/A;  PRE: COMMON DUCT STONE  POST: COMMON DUCT STONE    KYPHOPLASTY      REDUCTION MAMMAPLASTY      TONSILLECTOMY        General Information       Row Name 12/27/23 1600          Physical Therapy Time and Intention    Document Type therapy note (daily note)  -SV     Mode of Treatment individual therapy;physical therapy  -SV       Row Name 12/27/23 1600          General Information    Patient Profile Reviewed yes  -SV               User Key  (r) = Recorded By, (t) = Taken By, (c) = Cosigned By      Initials Name Provider Type    SV Helene Hernandez, PT Physical Therapist                   Mobility       Row Name 12/27/23 1639          Bed Mobility    Supine-Sit [place] (Bed Mobility) contact guard;standby assist  -SV     Assistive Device (Bed Mobility) bed rails;head of bed elevated  -SV       Row Name 12/27/23 1639          Bed-Chair Transfer    Bed-Chair [place] (Transfers) minimum assist (75% patient effort);moderate assist (50% patient effort)  -SV     Assistive Device (Bed-Chair Transfers) walker, front-wheeled  -SV     Comment, (Bed-Chair Transfer) impulsive, squat pivot, reaching for recliner vs Sit to full stand  -SV       Row Name 12/27/23 1639          Sit-Stand Transfer    Sit-Stand [place] (Transfers) moderate assist (50% patient effort);minimum assist (75% patient effort)  -SV     Assistive Device (Sit-Stand Transfers) walker, front-wheeled  -SV     Comment, (Sit-Stand Transfer) partial standing  -SV               User Key  (r) = Recorded By, (t) = Taken By, (c) = Cosigned By      Initials Name Provider Type    SV Helene Hernandez PT Physical Therapist                   Obj/Interventions    No documentation.                  Goals/Plan    No documentation.                  Clinical Impression       Row Name 12/27/23 1641          Pain    Pretreatment Pain Rating 5/10  -SV     Posttreatment Pain  Rating 5/10  -SV     Pain Location - Side/Orientation Left  -SV     Pain Location - foot  -SV     Pain Intervention(s) Repositioned  -SV       Row Name 12/27/23 1641          Plan of Care Review    Plan of Care Reviewed With patient  -SV       Row Name 12/27/23 1641          Vital Signs    O2 Delivery Intra Treatment room air  -SV     O2 Delivery Post Treatment room air  -SV     Pre Patient Position Supine  -SV     Intra Patient Position Standing  -SV     Post Patient Position Sitting  -SV       Row Name 12/27/23 1641          Positioning and Restraints    Pre-Treatment Position in bed  -SV     Post Treatment Position chair  -SV     In Chair with nsg;encouraged to call for assist;call light within reach;reclined  -SV               User Key  (r) = Recorded By, (t) = Taken By, (c) = Cosigned By      Initials Name Provider Type    Helene Hays, PT Physical Therapist                   Outcome Measures       Row Name 12/27/23 1642 12/27/23 0824       How much help from another person do you currently need...    Turning from your back to your side while in flat bed without using bedrails? 4  -SV 4  -MC    Moving from lying on back to sitting on the side of a flat bed without bedrails? 3  -SV 3  -MC    Moving to and from a bed to a chair (including a wheelchair)? 2  -SV 2  -MC    Standing up from a chair using your arms (e.g., wheelchair, bedside chair)? 2  -SV 2  -MC    Climbing 3-5 steps with a railing? 1  -SV 1  -MC    To walk in hospital room? 1  -SV 2  -MC    AM-PAC 6 Clicks Score (PT) 13  -SV 14  -MC    Highest Level of Mobility Goal 4 --> Transfer to chair/commode  -SV 4 --> Transfer to chair/commode  -MC              User Key  (r) = Recorded By, (t) = Taken By, (c) = Cosigned By      Initials Name Provider Type    Helene Hays, PT Physical Therapist    Faustina Catherine RN Registered Nurse                                 Physical Therapy Education       Title: PT OT SLP Therapies (In Progress)        Topic: Physical Therapy (In Progress)       Point: Mobility training (Done)       Learning Progress Summary             Patient Acceptance, E, VU by LW at 12/26/2023 1518                         Point: Home exercise program (Not Started)       Learner Progress:  Not documented in this visit.              Point: Body mechanics (Done)       Learning Progress Summary             Patient Acceptance, E, VU by LW at 12/26/2023 1518                         Point: Precautions (Done)       Learning Progress Summary             Patient Acceptance, E, VU by LW at 12/26/2023 1518                                         User Key       Initials Effective Dates Name Provider Type Discipline     05/08/23 -  Ileana Da Silva, PT Physical Therapist PT                  PT Recommendation and Plan     Plan of Care Reviewed With: patient     Time Calculation:         PT Charges       Row Name 12/27/23 1646             Time Calculation    Start Time 1615  -SV      Stop Time 1630  -SV      Time Calculation (min) 15 min  -SV      PT Received On 12/27/23  -      PT - Next Appointment 12/28/23  -                User Key  (r) = Recorded By, (t) = Taken By, (c) = Cosigned By      Initials Name Provider Type     Helene Hernandez, PT Physical Therapist                  Therapy Charges for Today       Code Description Service Date Service Provider Modifiers Qty    12184878455 HC PT THERAPEUTIC ACT EA 15 MIN 12/27/2023 Helene Hernandez, PT GP 1            PT G-Codes  Outcome Measure Options: AM-PAC 6 Clicks Basic Mobility (PT)  AM-PAC 6 Clicks Score (PT): 13       Helene Hernandez PT  12/27/2023

## 2023-12-27 NOTE — PLAN OF CARE
Patient alert and oriented x4. Wound culture sent per podiatry. Daily dressing change completed. Betadine 4x4, kerlix and coband applied to L foot. Patient to wear post op boot when weight baring.

## 2023-12-27 NOTE — CONSULTS
Podiatry Consult Note      Patient: Halie Guidry Admit Date: 12/24/2023    Age: 83 y.o.   PCP: Napoleon Mead MD    MRN: [id number]  Room: [facility]        Subjective     Chief Complaint     Chief Complaint   Patient presents with    Foot Pain        HPI     84yo F presents with a diabetic foot ulcer to the left foot. Patient states the ulcer first started in May 2023. She has been going to  Wound care and receiving conservative management. Patient states the ulcer was healed, but a few days before [place] the ulcer re-appeared. She took her shoe off at [place] dinner and noticed malodor and purulent drainage. She then came to  ED and was admitted.    Since being admitted, the patient has undergone both Xray and MRI foot which do not suggest osteomyelitis. She has received empiric IV abx.     Past Medical History     Past Medical History:   Diagnosis Date    Acute metabolic encephalopathy 6/24/2022    Anxiety     Arthritis     Benign essential hypertension 08/20/2014    Bleeding disorder     Depression     Diabetes     Diabetes mellitus, type 2     Disc degeneration, lumbar     Headache, tension-type     Hyperlipidemia     Hypothyroidism     Neuropathy     Osteoporosis 09/09/2015    Peripheral neuropathy     Rotator cuff tear, left     Scoliosis     Shoulder pain     LEFT, TORN ROTATOR CUFF S/P FALL    Spinal stenosis         Past Surgical History:   Procedure Laterality Date    APPENDECTOMY      BILATERAL BREAST REDUCTION Bilateral 08/2015    CATARACT EXTRACTION  03/2015    CHOLECYSTECTOMY WITH INTRAOPERATIVE CHOLANGIOGRAM N/A 3/27/2022    Procedure: CHOLECYSTECTOMY LAPAROSCOPIC INTRAOPERATIVE CHOLANGIOGRAM;  Surgeon: Aiyana Hill MD;  Location:  [facility] MAIN OR;  Service: General;  Laterality: N/A;    COLONOSCOPY  06/05/2015    WNL    ERCP N/A 2/25/2022    Procedure: ENDOSCOPIC RETROGRADE CHOLANGIOPANCREATOGRAPHY with sphincterotomy and balloon sweep;  Surgeon: Chilo Wilhelm MD;  Location:   [facility] ENDOSCOPY;  Service: Gastroenterology;  Laterality: N/A;  PRE/POST - CBD stones    ERCP N/A 3/28/2022    Procedure: ENDOSCOPIC RETROGRADE CHOLANGIOPANCREATOGRAPHY WITH SPHINCTEROTOMY AND BALLOON SWEEP;  Surgeon: Chilo Wilhelm MD;  Location: [facility] ENDOSCOPY;  Service: Gastroenterology;  Laterality: N/A;  PRE: COMMON DUCT STONE  POST: COMMON DUCT STONE    KYPHOPLASTY      REDUCTION MAMMAPLASTY      TONSILLECTOMY          Allergies   Allergen Reactions    Sulfa Antibiotics Unknown - High Severity     Pt says it was a long time ago and I don't remember but it wasn't good.         Social History     Tobacco Use   Smoking Status Former    Packs/day: 1.00    Years: 40.00    Additional pack years: 0.00    Total pack years: 40.00    Types: Cigarettes    Quit date: 2013    Years since quitting: 10.9   Smokeless Tobacco Never        Objective   Physical Exam    Vitals:    12/27/23 1133   BP: 162/70   Pulse: 62   Resp: 17   Temp: 97.5 °F (36.4 °C)   SpO2: 92%        Dermatology: There is a fluid collection to the medial aspect of the 1st MTPJ. Fluctuance is present. Mild periwound erythema. No proximal streaking.  Post-I&D: central fibrotic base, surrounding granular base. No deep extension, no probe to bone    Vascular: DP and PT pulses palpable, hair growth stops at mid calf    Neurological: Light touch and protective sensation are diminished    Musckuloskeletal: AJ ROM normal, mild pain with 1st MTPJ ROM, no crepitus noted    Labs     Lab Results   Component Value Date    HGBA1C 8.20 (H) 12/24/2023    POCGLU 315 (H) 12/27/2023    SEDRATE 25 12/24/2023        CBC:      Lab 12/27/23  0705 12/26/23  0922 12/24/23 2026   WBC 8.28 8.32 11.14*   HEMOGLOBIN 10.7* 10.8* 11.4*   HEMATOCRIT 32.0* 32.7* 36.9   PLATELETS 273 303 360   NEUTROS ABS  --   --  8.81*   IMMATURE GRANS (ABS)  --   --  0.05   LYMPHS ABS  --   --  1.01   MONOS ABS  --   --  0.88   EOS ABS  --   --  0.30   MCV 83.6 84.9 84.6          Results for  orders placed or performed during the hospital encounter of 12/24/23   Blood Culture - Blood, Arm, Left    Specimen: Arm, Left; Blood   Result Value Ref Range    Blood Culture No growth at 2 days         MRI Foot Left Without Contrast  Narrative: Patient: TABATHA CARTER  Time Out: 16:46  Exam(s): MRI LEFT FOOT Without Contrast     EXAM:  MR Left Lower Extremity Without Intravenous Contrast, Foot    CLINICAL HISTORY:  Reason for exam: diabetic ulcer.    TECHNIQUE:  Multiplanar magnetic resonance images of the left foot without   intravenous contrast.    COMPARISON:  Left foot radiographs 12 24 23    FINDINGS:  Skin markers in place along the plantar aspect of the foot at the level   of the interspace between the distal first and second metatarsal shafts.    No focal soft tissue abnormalities noted in this location.    There is a soft tissue ulcer along the plantar-medial aspect of the first   metatarsal head.    There is generalized subcutaneous edema of the foot which may represent   cellulitis.  No organized fluid collection is identified to suggest   abscess.    There are no characteristic marrow signal changes to suggest acute   osteomyelitis.  There is no imaging evidence of septic arthritis.    Osteoarthritic changes noted at the first MTP joint.  There is no   fracture or dislocation.    IMPRESSION:     1.  Soft tissue ulcer at the plantar-medial aspect of the first MTP joint.    Generalized subcutaneous edema cellulitis.  2.  No evidence of acute osteomyelitis.  Impression: Electronically signed by Haley Conley M.D. on 12-25-23 at 1646       Assessment/Plan     82yo F with Diabetic foot ulcer, abscess left foot    -pt examined and evaluated by myself  -Xray and MRI reviewed    -Bedside Procedure: The skin surface was prepped with betadine. No anaesthetic was used secondary to neuropathy. A sharp scissor and forcept were used to pucture and sharply incise the medial abscess. 2-3cc purulence were  expressed, and a culture swab was taken. The abscess was de-roofed and there was noted to be no deep extension. A dry sterile dressing of betadine, 4x4 and kerlix were applied to the left foot. Tolerated well.    -WBAT in post op shoe  -Betadine WTD dressing to left foot daily  -OK to discharge on PO abx; I will follow cultures if the abx need to be changed  -Pt will f/u with me outpatient      Everton Ross DPM  Office: [phone]

## 2023-12-28 LAB
ANION GAP SERPL CALCULATED.3IONS-SCNC: 10.5 MMOL/L (ref 5–15)
BACTERIA SPEC AEROBE CULT: ABNORMAL
BUN SERPL-MCNC: 20 MG/DL (ref 8–23)
BUN/CREAT SERPL: 17.9 (ref 7–25)
CALCIUM SPEC-SCNC: 8.4 MG/DL (ref 8.6–10.5)
CHLORIDE SERPL-SCNC: 101 MMOL/L (ref 98–107)
CO2 SERPL-SCNC: 21.5 MMOL/L (ref 22–29)
CREAT SERPL-MCNC: 1.12 MG/DL (ref 0.57–1)
CRP SERPL-MCNC: 0.99 MG/DL (ref 0–0.5)
DEPRECATED RDW RBC AUTO: 43.8 FL (ref 37–54)
EGFRCR SERPLBLD CKD-EPI 2021: 48.9 ML/MIN/1.73
ERYTHROCYTE [DISTWIDTH] IN BLOOD BY AUTOMATED COUNT: 14.7 % (ref 12.3–15.4)
GLUCOSE BLDC GLUCOMTR-MCNC: 242 MG/DL (ref 70–130)
GLUCOSE BLDC GLUCOMTR-MCNC: 270 MG/DL (ref 70–130)
GLUCOSE BLDC GLUCOMTR-MCNC: 293 MG/DL (ref 70–130)
GLUCOSE SERPL-MCNC: 284 MG/DL (ref 65–99)
GRAM STN SPEC: ABNORMAL
HCT VFR BLD AUTO: 30.2 % (ref 34–46.6)
HGB BLD-MCNC: 10.3 G/DL (ref 12–15.9)
MCH RBC QN AUTO: 28.1 PG (ref 26.6–33)
MCHC RBC AUTO-ENTMCNC: 34.1 G/DL (ref 31.5–35.7)
MCV RBC AUTO: 82.5 FL (ref 79–97)
PLATELET # BLD AUTO: 258 10*3/MM3 (ref 140–450)
PMV BLD AUTO: 9.5 FL (ref 6–12)
POTASSIUM SERPL-SCNC: 3.8 MMOL/L (ref 3.5–5.2)
RBC # BLD AUTO: 3.66 10*6/MM3 (ref 3.77–5.28)
SODIUM SERPL-SCNC: 133 MMOL/L (ref 136–145)
WBC NRBC COR # BLD AUTO: 8.12 10*3/MM3 (ref 3.4–10.8)

## 2023-12-28 PROCEDURE — 85027 COMPLETE CBC AUTOMATED: CPT | Performed by: STUDENT IN AN ORGANIZED HEALTH CARE EDUCATION/TRAINING PROGRAM

## 2023-12-28 PROCEDURE — 36410 VNPNXR 3YR/> PHY/QHP DX/THER: CPT

## 2023-12-28 PROCEDURE — C1751 CATH, INF, PER/CENT/MIDLINE: HCPCS

## 2023-12-28 PROCEDURE — 99223 1ST HOSP IP/OBS HIGH 75: CPT | Performed by: INTERNAL MEDICINE

## 2023-12-28 PROCEDURE — 25010000002 MORPHINE PER 10 MG: Performed by: NURSE PRACTITIONER

## 2023-12-28 PROCEDURE — 96376 TX/PRO/DX INJ SAME DRUG ADON: CPT

## 2023-12-28 PROCEDURE — 25810000003 SODIUM CHLORIDE 0.9 % SOLUTION: Performed by: NURSE PRACTITIONER

## 2023-12-28 PROCEDURE — 63710000001 INSULIN GLARGINE PER 5 UNITS: Performed by: STUDENT IN AN ORGANIZED HEALTH CARE EDUCATION/TRAINING PROGRAM

## 2023-12-28 PROCEDURE — 63710000001 INSULIN LISPRO (HUMAN) PER 5 UNITS: Performed by: STUDENT IN AN ORGANIZED HEALTH CARE EDUCATION/TRAINING PROGRAM

## 2023-12-28 PROCEDURE — 80048 BASIC METABOLIC PNL TOTAL CA: CPT | Performed by: STUDENT IN AN ORGANIZED HEALTH CARE EDUCATION/TRAINING PROGRAM

## 2023-12-28 PROCEDURE — 25010000002 CEFEPIME PER 500 MG: Performed by: INTERNAL MEDICINE

## 2023-12-28 PROCEDURE — 86140 C-REACTIVE PROTEIN: CPT | Performed by: INTERNAL MEDICINE

## 2023-12-28 PROCEDURE — 82948 REAGENT STRIP/BLOOD GLUCOSE: CPT

## 2023-12-28 RX ORDER — SODIUM CHLORIDE 0.9 % (FLUSH) 0.9 %
10 SYRINGE (ML) INJECTION EVERY 12 HOURS SCHEDULED
Status: DISCONTINUED | OUTPATIENT
Start: 2023-12-28 | End: 2023-12-30 | Stop reason: HOSPADM

## 2023-12-28 RX ORDER — SODIUM CHLORIDE 0.9 % (FLUSH) 0.9 %
10 SYRINGE (ML) INJECTION AS NEEDED
Status: DISCONTINUED | OUTPATIENT
Start: 2023-12-28 | End: 2023-12-30 | Stop reason: HOSPADM

## 2023-12-28 RX ORDER — SODIUM CHLORIDE 9 MG/ML
40 INJECTION, SOLUTION INTRAVENOUS AS NEEDED
Status: DISCONTINUED | OUTPATIENT
Start: 2023-12-28 | End: 2023-12-30 | Stop reason: HOSPADM

## 2023-12-28 RX ORDER — L.ACID,PARA/B.BIFIDUM/S.THERM 8B CELL
1 CAPSULE ORAL DAILY
Qty: 14 CAPSULE | Refills: 0 | Status: DISCONTINUED | OUTPATIENT
Start: 2023-12-28 | End: 2023-12-30 | Stop reason: HOSPADM

## 2023-12-28 RX ORDER — AMLODIPINE BESYLATE 10 MG/1
10 TABLET ORAL DAILY
Status: DISCONTINUED | OUTPATIENT
Start: 2023-12-28 | End: 2023-12-30 | Stop reason: HOSPADM

## 2023-12-28 RX ADMIN — ACETAMINOPHEN 650 MG: 325 TABLET, FILM COATED ORAL at 02:54

## 2023-12-28 RX ADMIN — PREGABALIN 50 MG: 50 CAPSULE ORAL at 16:45

## 2023-12-28 RX ADMIN — MORPHINE SULFATE 2 MG: 2 INJECTION, SOLUTION INTRAMUSCULAR; INTRAVENOUS at 05:49

## 2023-12-28 RX ADMIN — HYDROCODONE BITARTRATE AND ACETAMINOPHEN 1 TABLET: 10; 325 TABLET ORAL at 21:00

## 2023-12-28 RX ADMIN — PREGABALIN 50 MG: 50 CAPSULE ORAL at 10:01

## 2023-12-28 RX ADMIN — CEFEPIME HYDROCHLORIDE 1000 MG: 1 INJECTION, POWDER, FOR SOLUTION INTRAMUSCULAR; INTRAVENOUS at 09:58

## 2023-12-28 RX ADMIN — TERAZOSIN HYDROCHLORIDE 1 MG: 1 CAPSULE ORAL at 21:01

## 2023-12-28 RX ADMIN — INSULIN GLARGINE 45 UNITS: 100 INJECTION, SOLUTION SUBCUTANEOUS at 10:01

## 2023-12-28 RX ADMIN — PANTOPRAZOLE SODIUM 40 MG: 40 TABLET, DELAYED RELEASE ORAL at 16:45

## 2023-12-28 RX ADMIN — CEFEPIME HYDROCHLORIDE 1000 MG: 1 INJECTION, POWDER, FOR SOLUTION INTRAMUSCULAR; INTRAVENOUS at 21:06

## 2023-12-28 RX ADMIN — INSULIN LISPRO 4 UNITS: 100 INJECTION, SOLUTION INTRAVENOUS; SUBCUTANEOUS at 21:06

## 2023-12-28 RX ADMIN — FUROSEMIDE 20 MG: 20 TABLET ORAL at 10:01

## 2023-12-28 RX ADMIN — CHLORTHALIDONE 25 MG: 25 TABLET ORAL at 10:02

## 2023-12-28 RX ADMIN — HYDROCODONE BITARTRATE AND ACETAMINOPHEN 1 TABLET: 10; 325 TABLET ORAL at 16:45

## 2023-12-28 RX ADMIN — INSULIN LISPRO 4 UNITS: 100 INJECTION, SOLUTION INTRAVENOUS; SUBCUTANEOUS at 12:43

## 2023-12-28 RX ADMIN — AMLODIPINE BESYLATE 10 MG: 10 TABLET ORAL at 10:02

## 2023-12-28 RX ADMIN — Medication 10 ML: at 21:01

## 2023-12-28 RX ADMIN — DULOXETINE HYDROCHLORIDE 30 MG: 30 CAPSULE, DELAYED RELEASE ORAL at 10:02

## 2023-12-28 RX ADMIN — Medication 1 CAPSULE: at 12:43

## 2023-12-28 RX ADMIN — BISOPROLOL FUMARATE 5 MG: 5 TABLET, FILM COATED ORAL at 10:02

## 2023-12-28 RX ADMIN — PANTOPRAZOLE SODIUM 40 MG: 40 TABLET, DELAYED RELEASE ORAL at 10:01

## 2023-12-28 RX ADMIN — INSULIN LISPRO 3 UNITS: 100 INJECTION, SOLUTION INTRAVENOUS; SUBCUTANEOUS at 17:20

## 2023-12-28 RX ADMIN — ATORVASTATIN CALCIUM 80 MG: 20 TABLET, FILM COATED ORAL at 21:00

## 2023-12-28 RX ADMIN — LISINOPRIL 40 MG: 40 TABLET ORAL at 10:02

## 2023-12-28 RX ADMIN — LEVOTHYROXINE SODIUM 112 MCG: 112 TABLET ORAL at 05:49

## 2023-12-28 RX ADMIN — Medication 10 ML: at 10:01

## 2023-12-28 RX ADMIN — SODIUM CHLORIDE 75 ML/HR: 9 INJECTION, SOLUTION INTRAVENOUS at 05:50

## 2023-12-28 RX ADMIN — INSULIN LISPRO 4 UNITS: 100 INJECTION, SOLUTION INTRAVENOUS; SUBCUTANEOUS at 10:00

## 2023-12-28 RX ADMIN — HYDROCODONE BITARTRATE AND ACETAMINOPHEN 1 TABLET: 10; 325 TABLET ORAL at 10:01

## 2023-12-28 RX ADMIN — PREGABALIN 50 MG: 50 CAPSULE ORAL at 21:00

## 2023-12-28 NOTE — SIGNIFICANT NOTE
12/28/23 1611   OTHER   Discipline physical therapy assistant   Rehab Time/Intention   Session Not Performed patient/family declined treatment  (checked on pt this PM. Pt declined PT treatment due to wanting to leave and feeling frustrated of not knowing what is going on. Pt UIC. Will f/u with pt tomorrow.)   Recommendation   PT - Next Appointment 12/29/23

## 2023-12-28 NOTE — CONSULTS
"Referring Provider: Dr. Mendoza    Reason for Consultation: wound culture with MDR Enterobacter cloacae. and MSSA.  Wound culture from today done by podiatry may be more useful.     History of present illness:  Halie Guidry is a 83 y.o. who I am asked to evaluate and give opinion for \"wound culture with MDR Enterobacter cloacae. and MSSA.  Wound culture from today done by podiatry may be more useful.\" History is obtained from the patient and review of the old medical records which I summarize/synthesize as follows: She presented to the emergency room around 7:30 PM on Christmas Eve.  She was having trouble with a draining left foot wound.  The wound has been present dating back to May 2023.  There would be periods of improvement and periods of worsening.  Currently she was having foul-smelling and purulent drainage.  She was also having increasing pain, swelling, and redness.  No associated fever or chills.    In the emergency room she was afebrile with a normal heart rate.  Her blood pressure was elevated.  Labs were notable for a WBC of 11, CRP of 1.66, and creatinine of 1.4.  She had an x-ray done that was negative for osteomyelitis in the left foot.  She was started on empiric vancomycin and cefepime.  She ended up having an MRI done that also was negative for osteomyelitis.  A wound culture of the area done is growing MSSA and Enterobacter.  Podiatry was consulted and she had a bedside debridement with about 3 cc of purulent fluid expressed.  A repeat culture was performed and is pending.  The Gram stain has gram-negative rods.  She plans to follow-up with them as an outpatient.  Local wound care has been recommended.         Past Medical History:   Diagnosis Date    Acute metabolic encephalopathy 6/24/2022    Anxiety     Arthritis     Benign essential hypertension 08/20/2014    Bleeding disorder     Depression     Diabetes     Diabetes mellitus, type 2     Disc degeneration, lumbar     Headache, " tension-type     Hyperlipidemia     Hypothyroidism     Neuropathy     Osteoporosis 09/09/2015    Peripheral neuropathy     Rotator cuff tear, left     Scoliosis     Shoulder pain     LEFT, TORN ROTATOR CUFF S/P FALL    Spinal stenosis        Past Surgical History:   Procedure Laterality Date    APPENDECTOMY      BILATERAL BREAST REDUCTION Bilateral 08/2015    CATARACT EXTRACTION  03/2015    CHOLECYSTECTOMY WITH INTRAOPERATIVE CHOLANGIOGRAM N/A 3/27/2022    Procedure: CHOLECYSTECTOMY LAPAROSCOPIC INTRAOPERATIVE CHOLANGIOGRAM;  Surgeon: Aiyana Hill MD;  Location: [facility] MAIN OR;  Service: General;  Laterality: N/A;    COLONOSCOPY  06/05/2015    WNL    ERCP N/A 2/25/2022    Procedure: ENDOSCOPIC RETROGRADE CHOLANGIOPANCREATOGRAPHY with sphincterotomy and balloon sweep;  Surgeon: Chilo Wilhelm MD;  Location: [facility] ENDOSCOPY;  Service: Gastroenterology;  Laterality: N/A;  PRE/POST - CBD stones    ERCP N/A 3/28/2022    Procedure: ENDOSCOPIC RETROGRADE CHOLANGIOPANCREATOGRAPHY WITH SPHINCTEROTOMY AND BALLOON SWEEP;  Surgeon: Chilo Wilhelm MD;  Location: [facility] ENDOSCOPY;  Service: Gastroenterology;  Laterality: N/A;  PRE: COMMON DUCT STONE  POST: COMMON DUCT STONE    KYPHOPLASTY      REDUCTION MAMMAPLASTY      TONSILLECTOMY         Antibiotic allergies and intolerances:  Sulfa    Medications:    Current Facility-Administered Medications:     acetaminophen (TYLENOL) tablet 650 mg, 650 mg, Oral, Q4H PRN, 650 mg at 12/28/23 0254 **OR** acetaminophen (TYLENOL) 160 MG/5ML oral solution 650 mg, 650 mg, Oral, Q4H PRN **OR** acetaminophen (TYLENOL) suppository 650 mg, 650 mg, Rectal, Q4H PRN, Beena Morgan APRN    amLODIPine (NORVASC) tablet 10 mg, 10 mg, Oral, Daily, Cedrick Mendoza MD    atorvastatin (LIPITOR) tablet 80 mg, 80 mg, Oral, Nightly, Beena Morgan APRN, 80 mg at 12/26/23 2031    bisoprolol (ZEBeta) tablet 5 mg, 5 mg, Oral, Daily, Beena Morgan APRN, 5 mg at 12/27/23 5349     calcium carbonate (TUMS) chewable tablet 500 mg (200 mg elemental), 2 tablet, Oral, BID PRN, Beena Morgan APRN    cefepime 1000 mg IVPB in 100 mL NS (VTB), 1,000 mg, Intravenous, Q12H, Beena Morgan APRN, 1,000 mg at 12/27/23 2118    chlorthalidone (HYGROTON) tablet 25 mg, 25 mg, Oral, Daily, Cedrick Mendoza MD, 25 mg at 12/27/23 0826    dextrose (D50W) (25 g/50 mL) IV injection 25 g, 25 g, Intravenous, Q15 Min PRN, Cedrick Mendoza MD    dextrose (GLUTOSE) oral gel 15 g, 15 g, Oral, Q15 Min PRN, Cedrick Mendoza MD    dicyclomine (BENTYL) capsule 20 mg, 20 mg, Oral, TID PRN, Beena Morgan APRN    DULoxetine (CYMBALTA) DR capsule 30 mg, 30 mg, Oral, Daily, Beena Morgan APRN, 30 mg at 12/27/23 0826    furosemide (LASIX) tablet 20 mg, 20 mg, Oral, Daily, Cedrick Mendoza MD, 20 mg at 12/27/23 0826    glucagon (GLUCAGEN) injection 1 mg, 1 mg, Intramuscular, Q15 Min PRN, Cedrick Mendoza MD    HYDROcodone-acetaminophen (NORCO)  MG per tablet 1 tablet, 1 tablet, Oral, TID, Beena Morgan APRN, 1 tablet at 12/27/23 2119    insulin glargine (LANTUS, SEMGLEE) injection 45 Units, 45 Units, Subcutaneous, Daily, Cedrick Mendoza MD    insulin lispro (HUMALOG/ADMELOG) injection 2-7 Units, 2-7 Units, Subcutaneous, 4x Daily AC & at Bedtime, Cedrick Mendoza MD, 4 Units at 12/27/23 2119    levothyroxine (SYNTHROID, LEVOTHROID) tablet 112 mcg, 112 mcg, Oral, Q AM, Beena Morgan APRN, 112 mcg at 12/28/23 0549    lisinopril (PRINIVIL,ZESTRIL) tablet 40 mg, 40 mg, Oral, Daily, Cedrick Mendoza MD, 40 mg at 12/27/23 0826    morphine injection 2 mg, 2 mg, Intravenous, Q4H PRN, Beena Morgan APRN, 2 mg at 12/28/23 0549    ondansetron ODT (ZOFRAN-ODT) disintegrating tablet 4 mg, 4 mg, Oral, Q6H PRN **OR** ondansetron (ZOFRAN) injection 4 mg, 4 mg, Intravenous, Q6H PRN, Beena Morgan APRN    pantoprazole (PROTONIX) EC tablet 40 mg, 40 mg, Oral, BID AC, Beena Morgan APRN, 40  mg at 12/27/23 1723    Pharmacy to dose vancomycin, , Does not apply, Continuous PRN, Beena Morgan APRN    pregabalin (LYRICA) capsule 50 mg, 50 mg, Oral, TID, Beena Morgan APRN, 50 mg at 12/27/23 2119    [COMPLETED] Insert Peripheral IV, , , Once **AND** sodium chloride 0.9 % flush 10 mL, 10 mL, Intravenous, PRN, Franco Florez PA    [COMPLETED] Insert Peripheral IV, , , Once **AND** sodium chloride 0.9 % flush 10 mL, 10 mL, Intravenous, PRN, Franco Lazaro MD    sodium chloride 0.9 % flush 10 mL, 10 mL, Intravenous, Q12H, Beena Morgan APRN, 10 mL at 12/27/23 2120    sodium chloride 0.9 % flush 10 mL, 10 mL, Intravenous, PRN, Beena Morgan APRN    sodium chloride 0.9 % infusion 40 mL, 40 mL, Intravenous, PRN, Beena Morgan APRN    sodium chloride 0.9 % infusion, 75 mL/hr, Intravenous, Continuous, Beena Morgan APRN, Last Rate: 75 mL/hr at 12/28/23 0550, 75 mL/hr at 12/28/23 0550    terazosin (HYTRIN) capsule 1 mg, 1 mg, Oral, Nightly, Beena Morgan APRN, 1 mg at 12/27/23 2119    vancomycin 1250 mg/250 mL 0.9% NS IVPB ([facility]), 1,250 mg, Intravenous, Q24H, Cedrick Mendoza MD, 1,250 mg at 12/27/23 1224      Objective   Vital Signs   Temp:  [97.2 °F (36.2 °C)-98.2 °F (36.8 °C)] 97.2 °F (36.2 °C)  Heart Rate:  [57-68] 60  Resp:  [17-18] 18  BP: (148-172)/(55-72) 160/70    Physical Exam:   General: awake, alert, in bed, very nice  Eyes: no scleral icterus  Cardiovascular: NR  Respiratory: normal work of breathing   GI: Abdomen is soft, not tender  :  no Baig catheter  MSK: L 1st MTP area with shallow ulcer; no surrounding erythema; no purulence  Neurological: Alert and oriented x 3  Psychiatric: Normal mood and affect   Vasc: PIV w/o erythema    Labs:     Lab Results   Component Value Date    WBC 8.12 12/28/2023    HGB 10.3 (L) 12/28/2023    HCT 30.2 (L) 12/28/2023    MCV 82.5 12/28/2023     12/28/2023       Lab Results   Component Value Date     "GLUCOSE 284 (H) 12/28/2023    BUN 20 12/28/2023    CREATININE 1.12 (H) 12/28/2023    EGFRIFNONA 51 (L) 02/28/2022    EGFRIFAFRI 50 (L) 01/18/2021    BCR 17.9 12/28/2023    CO2 21.5 (L) 12/28/2023    CALCIUM 8.4 (L) 12/28/2023    PROTENTOTREF 7.2 06/02/2023    ALBUMIN 3.6 12/24/2023    LABIL2 1.3 06/02/2023    AST 12 12/24/2023    ALT 7 12/24/2023     Lab Results   Component Value Date    CRP 1.66 (H) 12/24/2023     Lab Results   Component Value Date    HGBA1C 8.20 (H) 12/24/2023     Lab Results   Component Value Date    VANCOTROUGH 11.70 12/26/2023       Microbiology:  12/24 BCx: negative to date  12/24 Left Foot Wound Cx: rare Enterobacter and scant MSSA  12/25 UCx: negative  12/27 Left Foot Wound Cx: rare GNRs on gram stain    Radiology:  MRI L foot:   \"1.  Soft tissue ulcer at the plantar-medial aspect of the first MTP joint. Generalized subcutaneous edema cellulitis.   2.  No evidence of acute osteomyelitis. \"    XR L Foot: \"No definite cortical breakdown is seen to suggest osteomyelitis,   although MRI would be more sensitive for assessment. \"    EKG personally reviewed with QTc 445 ms    ASSESSMENT/PLAN:  Diabetic left foot ulcer with infection present  Uncontrolled DM2  Obesity BMI 31  History of C diff    Thanks to podiatry for their intervention to help provide source control.  Thankfully advanced imaging does not show any evidence of deep infection such as osteomyelitis or septic arthritis.    While there are good options for oral treatment of her MSSA, there are no good options for an oral antibiotic for her Enterobacter.  I called the microbiology lab to get ciprofloxacin susceptibilities but unfortunately her isolate is resistant to ciprofloxacin.    Therefore I recommend that she continue on cefepime 1 g IV every 12 hours for a total 10-day course with a stop date of January 3, 2023.  She will need a midline catheter placed. I will order.     No weekly labs or ID follow-up needed with short course " remaining. She will follow-up w/ podiatrist after discharge.     With history of C diff, I am going to put her on a probiotic for about 2 weeks.     I will discuss the case and plan with her primary attending Dr. Mendoza and case management

## 2023-12-28 NOTE — SIGNIFICANT NOTE
"   12/28/23 1158   Midline Catheter - Single Lumen 12/28/23 Left Basilic   Placement date: If unknown, DO NOT use \"Add Comment\" note/Placement time: If unknown, DO NOT use \"Add Comment\" note: 12/28/23 1145   Hand Hygiene Completed: Yes  Site Prep: Chlorhexidine isopropyl alcohol  All 5 Sterile Barriers Used (Gloves, Gown, Ca...   Site Assessment Clean;Dry;Intact   Line Status Blood return noted;Capped;Flushed;Saline locked   Length haja (cm) 0 cm   Extremity Circumference (cm) 32 cm   Dressing Type Border Dressing;Transparent;Securing device;Antimicrobial dressing/disc   Dressing Status Clean;Dry;Intact   Dressing Intervention New dressing   Liquid Adhesive Applied   Dressing Change Due 01/04/24       MIDLINE PLACED, PRIMARY RN NOTIFIED MIDLINE IS READY TO BE USED     LOT - [id number]  EX - 01/31/25    FINAL COUNT - 3 NEEDLES, 2 GUIDEWIRES, 1 SCALPEL ACCOUNTED FOR AT PROCEDURE END     TRIM LENGTH - 14CM  "

## 2023-12-28 NOTE — DISCHARGE PLACEMENT REQUEST
"Tabatha Guidry (83 y.o. Female)       Date of Birth   1940    Social Security Number       Address   [address] [place] [address] [address] [place] [address]    Home Phone   [phone]    MRN   [id number]       Ep[employer]   Ad[employer]ism    Marital Status                               Admission Date   12/24/23    Admission Type   Emergency    Admitting Provider   Cedrick Mendoza MD    Attending Provider   Cedrick Mendoza MD    Department, Room/Bed   [address], P385/1       Discharge Date       Discharge Disposition       Discharge Destination                                 Attending Provider: Cedrick Mendoza MD    Allergies: Sulfa Antibiotics    Isolation: Contact   Infection: Candida Auris (rule out) (12/27/23)   Code Status: CPR    Ht: 172.7 cm (67.99\")   Wt: 95.1 kg (209 lb 9.6 oz)    Admission Cmt: None   Principal Problem: Diabetic foot ulcer [E11.621,L97.509]                   Active Insurance as of 12/24/2023       Primary Coverage       Payor Plan Insurance Group Employer/Plan Group    [facility] MEDICARE REPLACEMENT [facility] MED ADV GROUP [id number]       Payor Plan Address Payor Plan Phone Number Payor Plan Fax Number Effective Dates    [address] [address]   1/1/2023 - None Entered    [facility] [id number]         Subscriber Name Subscriber Birth Date Member ID       TABATHA GUIDRY 1940 [id number]                     Emergency Contacts        (Rel.) Home Phone Work Phone Mobile Phone    YunielantonDerrick (Son) [phone] -- [phone]    Josse Guidry (Son) [phone] -- [phone]    JAYME GUIDRY (Grandchild) -- -- [phone]                "

## 2023-12-28 NOTE — CASE MANAGEMENT/SOCIAL WORK
Continued Stay Note  [facility]     Patient Name: Halie Guidry  MRN: [id number]  Today's Date: 12/28/2023    Admit Date: 12/24/2023    Plan: New referrals pending for SNF   Discharge Plan       Row Name 12/28/23 1606       Plan    Plan New referrals pending for SNF    Patient/Family in Agreement with Plan yes    Plan Comments Spoke with patient at bedside. Introduced self. CCP explained PT recommending SNF. Patient agreeable to referrals to [facility] and [place].                   Discharge Codes    No documentation.                 Expected Discharge Date and Time       Expected Discharge Date Expected Discharge Time    Dec 29, 2023               Maribeth Khan RN

## 2023-12-28 NOTE — PLAN OF CARE
Patient alert and oriented x4. Patient's dressing change completed this shift. Patient midline placed for home IV abx. Patient pending rehab placement.

## 2023-12-28 NOTE — PROGRESS NOTES
Name: Halie Guidry ADMIT: 2023   : 1940  PCP: Napoleon Mead MD    MRN: [id number] LOS: 4 days   AGE/SEX: 83 y.o. female  ROOM: [facility]     Subjective   Subjective   Had a long discussion about the need for rehab at this time.  She has been asked to assist in lives at home with her son.  But now she will need IV antibiotics for the next 5 days.  Discussed my concern that she really does need to go to rehab with her inability to move without assistance and he needs IV antibiotics.  Patient hesitant to go to rehab facilities.  Did discuss that this bug is really only sensitive to on IV antibiotic and if she does not treated appropriately that it could worsen and may become resistant.    Review of Systems   Constitutional:  Negative for chills and fever.   Respiratory:  Negative for cough and shortness of breath.    Cardiovascular:  Negative for chest pain and leg swelling.   Gastrointestinal:  Negative for abdominal pain, diarrhea and nausea.     As above     Objective   Objective   Vital Signs  Temp:  [97.2 °F (36.2 °C)-98.2 °F (36.8 °C)] 97.2 °F (36.2 °C)  Heart Rate:  [57-68] 60  Resp:  [18] 18  BP: (148-172)/(55-72) 160/70  SpO2:  [93 %-96 %] 93 %  on   ;   Device (Oxygen Therapy): room air  Body mass index is 31.88 kg/m².  Physical Exam  Constitutional:       General: She is not in acute distress.     Appearance: She is not ill-appearing.   Cardiovascular:      Rate and Rhythm: Normal rate and regular rhythm.   Pulmonary:      Effort: Pulmonary effort is normal. No respiratory distress.   Abdominal:      General: Abdomen is flat. There is no distension.      Tenderness: There is no abdominal tenderness.   Musculoskeletal:         General: No swelling or deformity. Normal range of motion.   Skin:     General: Skin is warm and dry.      Comments: Ulcer on the plantar left great toe.  Evidence of bilateral redness consistent with bilateral venous stasis dermatitis.   Neurological:       General: No focal deficit present.      Mental Status: She is alert. Mental status is at baseline.         Results Review     I reviewed the patient's new clinical results.  Results from last 7 days   Lab Units 12/28/23  0633 12/27/23  0705 12/26/23  0922 12/24/23 2026   WBC 10*3/mm3 8.12 8.28 8.32 11.14*   HEMOGLOBIN g/dL 10.3* 10.7* 10.8* 11.4*   PLATELETS 10*3/mm3 258 273 303 360     Results from last 7 days   Lab Units 12/28/23  0633 12/27/23  0705 12/26/23  0922 12/25/23  1105 12/24/23 2026   SODIUM mmol/L 133* 133* 137  --  135*   POTASSIUM mmol/L 3.8 3.8 4.3  --  3.9   CHLORIDE mmol/L 101 102 102  --  98   CO2 mmol/L 21.5* 21.0* 21.0*  --  23.2   BUN mg/dL 20 25* 24*  --  34*   CREATININE mg/dL 1.12* 1.13* 1.19* 1.26* 1.40*   GLUCOSE mg/dL 284* 270* 279*  --  342*   Estimated Creatinine Clearance: 45.9 mL/min (A) (by C-G formula based on SCr of 1.12 mg/dL (H)).  Results from last 7 days   Lab Units 12/24/23 2026   ALBUMIN g/dL 3.6   BILIRUBIN mg/dL 0.3   ALK PHOS U/L 114   AST (SGOT) U/L 12   ALT (SGPT) U/L 7     Results from last 7 days   Lab Units 12/28/23  0633 12/27/23  0705 12/26/23 0922 12/24/23 2026   CALCIUM mg/dL 8.4* 8.5* 8.8 8.9   ALBUMIN g/dL  --   --   --  3.6     Results from last 7 days   Lab Units 12/25/23  0554 12/24/23  2258 12/24/23 2026   PROCALCITONIN ng/mL  --   --  0.07   LACTATE mmol/L 1.8 2.6* 2.2*     COVID19   Date Value Ref Range Status   05/13/2023 Not Detected Not Detected - Ref. Range Final   11/03/2022 Not Detected Not Detected - Ref. Range Final     Glucose   Date/Time Value Ref Range Status   12/27/2023 2043 284 (H) 70 - 130 mg/dL Final   12/27/2023 1619 234 (H) 70 - 130 mg/dL Final   12/27/2023 1132 315 (H) 70 - 130 mg/dL Final   12/27/2023 0728 258 (H) 70 - 130 mg/dL Final   12/26/2023 1956 330 (H) 70 - 130 mg/dL Final   12/26/2023 1716 233 (H) 70 - 130 mg/dL Final   12/26/2023 1224 369 (H) 70 - 130 mg/dL Final       MRI Foot Left Without Contrast  Narrative: Patient:  TABATHA CARTER  Time Out: 16:46  Exam(s): MRI LEFT FOOT Without Contrast     EXAM:  MR Left Lower Extremity Without Intravenous Contrast, Foot    CLINICAL HISTORY:  Reason for exam: diabetic ulcer.    TECHNIQUE:  Multiplanar magnetic resonance images of the left foot without   intravenous contrast.    COMPARISON:  Left foot radiographs 12 24 23    FINDINGS:  Skin markers in place along the plantar aspect of the foot at the level   of the interspace between the distal first and second metatarsal shafts.    No focal soft tissue abnormalities noted in this location.    There is a soft tissue ulcer along the plantar-medial aspect of the first   metatarsal head.    There is generalized subcutaneous edema of the foot which may represent   cellulitis.  No organized fluid collection is identified to suggest   abscess.    There are no characteristic marrow signal changes to suggest acute   osteomyelitis.  There is no imaging evidence of septic arthritis.    Osteoarthritic changes noted at the first MTP joint.  There is no   fracture or dislocation.    IMPRESSION:     1.  Soft tissue ulcer at the plantar-medial aspect of the first MTP joint.    Generalized subcutaneous edema cellulitis.  2.  No evidence of acute osteomyelitis.  Impression: Electronically signed by Haley Conley M.D. on 12-25-23 at 1646    I reviewed the patient's daily medications.  Scheduled Medications  amLODIPine, 10 mg, Oral, Daily  atorvastatin, 80 mg, Oral, Nightly  bisoprolol, 5 mg, Oral, Daily  cefepime, 1,000 mg, Intravenous, Q12H  chlorthalidone, 25 mg, Oral, Daily  DULoxetine, 30 mg, Oral, Daily  furosemide, 20 mg, Oral, Daily  HYDROcodone-acetaminophen, 1 tablet, Oral, TID  insulin glargine, 45 Units, Subcutaneous, Daily  insulin lispro, 2-7 Units, Subcutaneous, 4x Daily AC & at Bedtime  lactobacillus acidophilus, 1 capsule, Oral, Daily  levothyroxine, 112 mcg, Oral, Q AM  lisinopril, 40 mg, Oral, Daily  pantoprazole, 40 mg, Oral, BID  AC  pregabalin, 50 mg, Oral, TID  sodium chloride, 10 mL, Intravenous, Q12H  terazosin, 1 mg, Oral, Nightly    Infusions  sodium chloride, 75 mL/hr, Last Rate: 75 mL/hr (12/28/23 0550)    Diet  Diet: Diabetic Diets; Consistent Carbohydrate; Texture: Regular Texture (IDDSI 7); Fluid Consistency: Thin (IDDSI 0)         I have personally reviewed:  [x]  Laboratory   [x]  Microbiology   [x]  Radiology   []  EKG/Telemetry   []  Cardiology/Vascular   []  Pathology   []  Records     Assessment/Plan     Active Hospital Problems    Diagnosis  POA   • **Diabetic foot ulcer [E11.621, L97.509]  Yes   • Primary malignant neuroendocrine tumor of pancreas [C7A.8]  Yes   • Anxiety disorder [F41.9]  Yes   • Type 2 diabetes mellitus with hyperglycemia, with long-term current use of insulin [E11.65, Z79.4]  Not Applicable   • Primary hypothyroidism [E03.9]  Yes   • Hyperlipidemia [E78.5]  Yes   • Benign essential hypertension [I10]  Yes   • Stage 3a chronic kidney disease [N18.31]  Yes      Resolved Hospital Problems   No resolved problems to display.       83 y.o. female admitted with Diabetic foot ulcer.      Acute on chronic diabetic foot ulcer  Chronic venous stasis dermatitis  -Cefepime and vancomycin  -Wound culture from admission growing multidrug-resistant Enterobacter as well as MSSA  -MRI foot soft tissue ulcer of first MTP joint, no osteomyelitis  -Podiatry consulted, performed bedside I&D and sent for culture.  Weightbearing as tolerated in postop shoe.  -Wound culture growing multidrug-resistant Enterobacter  -ID consulted-plan for midline and IV cefepime 1g twice daily for total of 10 days.  Plan for probiotic with history of C. difficile     Hypertension-continue home medications     Diabetes type 2 on chronic insulin, A1c 8.2%  -glargine to 45 unitis sliding scale insulin, titrate as needed     Hypothyroidism-continue home Synthroid     Neuroendocrine tumor of the pancreas-follows oncology as an outpatient.  Currently  getting octreotide injections monthly.     CKD 3 stage IIIa, stable, baseline    SCDs for DVT prophylaxis.  Full code.  Discussed with patient and nursing staff.  Anticipate discharge to SNU facility once arrangements have been made.      Expected Discharge Date: 12/28/2023; Expected Discharge Time:       Cedrick Mendoza MD  [facility]  12/28/23  11:37 EST

## 2023-12-28 NOTE — PLAN OF CARE
Goal Outcome Evaluation:    Patient alert and oriented v/s stable call light at reach we will continue monitoring

## 2023-12-29 LAB
ANION GAP SERPL CALCULATED.3IONS-SCNC: 13 MMOL/L (ref 5–15)
BACTERIA SPEC AEROBE CULT: NORMAL
BACTERIA SPEC AEROBE CULT: NORMAL
BUN SERPL-MCNC: 21 MG/DL (ref 8–23)
BUN/CREAT SERPL: 16.5 (ref 7–25)
CALCIUM SPEC-SCNC: 9 MG/DL (ref 8.6–10.5)
CHLORIDE SERPL-SCNC: 99 MMOL/L (ref 98–107)
CO2 SERPL-SCNC: 22 MMOL/L (ref 22–29)
CREAT SERPL-MCNC: 1.27 MG/DL (ref 0.57–1)
DEPRECATED RDW RBC AUTO: 44.7 FL (ref 37–54)
EGFRCR SERPLBLD CKD-EPI 2021: 42 ML/MIN/1.73
ERYTHROCYTE [DISTWIDTH] IN BLOOD BY AUTOMATED COUNT: 14.9 % (ref 12.3–15.4)
GLUCOSE BLDC GLUCOMTR-MCNC: 173 MG/DL (ref 70–130)
GLUCOSE BLDC GLUCOMTR-MCNC: 218 MG/DL (ref 70–130)
GLUCOSE BLDC GLUCOMTR-MCNC: 253 MG/DL (ref 70–130)
GLUCOSE BLDC GLUCOMTR-MCNC: 273 MG/DL (ref 70–130)
GLUCOSE SERPL-MCNC: 265 MG/DL (ref 65–99)
HCT VFR BLD AUTO: 34.8 % (ref 34–46.6)
HGB BLD-MCNC: 11.7 G/DL (ref 12–15.9)
MCH RBC QN AUTO: 27.9 PG (ref 26.6–33)
MCHC RBC AUTO-ENTMCNC: 33.6 G/DL (ref 31.5–35.7)
MCV RBC AUTO: 82.9 FL (ref 79–97)
PLATELET # BLD AUTO: 288 10*3/MM3 (ref 140–450)
PMV BLD AUTO: 9.8 FL (ref 6–12)
POTASSIUM SERPL-SCNC: 3.7 MMOL/L (ref 3.5–5.2)
RBC # BLD AUTO: 4.2 10*6/MM3 (ref 3.77–5.28)
SODIUM SERPL-SCNC: 134 MMOL/L (ref 136–145)
WBC NRBC COR # BLD AUTO: 11.91 10*3/MM3 (ref 3.4–10.8)

## 2023-12-29 PROCEDURE — G0378 HOSPITAL OBSERVATION PER HR: HCPCS

## 2023-12-29 PROCEDURE — 82948 REAGENT STRIP/BLOOD GLUCOSE: CPT

## 2023-12-29 PROCEDURE — 96375 TX/PRO/DX INJ NEW DRUG ADDON: CPT

## 2023-12-29 PROCEDURE — 63710000001 INSULIN GLARGINE PER 5 UNITS: Performed by: STUDENT IN AN ORGANIZED HEALTH CARE EDUCATION/TRAINING PROGRAM

## 2023-12-29 PROCEDURE — 97530 THERAPEUTIC ACTIVITIES: CPT

## 2023-12-29 PROCEDURE — 85027 COMPLETE CBC AUTOMATED: CPT | Performed by: STUDENT IN AN ORGANIZED HEALTH CARE EDUCATION/TRAINING PROGRAM

## 2023-12-29 PROCEDURE — 63710000001 INSULIN LISPRO (HUMAN) PER 5 UNITS: Performed by: STUDENT IN AN ORGANIZED HEALTH CARE EDUCATION/TRAINING PROGRAM

## 2023-12-29 PROCEDURE — 80048 BASIC METABOLIC PNL TOTAL CA: CPT | Performed by: STUDENT IN AN ORGANIZED HEALTH CARE EDUCATION/TRAINING PROGRAM

## 2023-12-29 PROCEDURE — 25010000002 CEFEPIME PER 500 MG: Performed by: INTERNAL MEDICINE

## 2023-12-29 PROCEDURE — 25010000002 ONDANSETRON PER 1 MG: Performed by: NURSE PRACTITIONER

## 2023-12-29 RX ORDER — ENOXAPARIN SODIUM 100 MG/ML
1 INJECTION SUBCUTANEOUS EVERY 12 HOURS
Status: DISCONTINUED | OUTPATIENT
Start: 2023-12-29 | End: 2023-12-30

## 2023-12-29 RX ADMIN — DULOXETINE HYDROCHLORIDE 30 MG: 30 CAPSULE, DELAYED RELEASE ORAL at 09:29

## 2023-12-29 RX ADMIN — LISINOPRIL 40 MG: 40 TABLET ORAL at 09:29

## 2023-12-29 RX ADMIN — PANTOPRAZOLE SODIUM 40 MG: 40 TABLET, DELAYED RELEASE ORAL at 17:23

## 2023-12-29 RX ADMIN — Medication 10 ML: at 09:39

## 2023-12-29 RX ADMIN — HYDROCODONE BITARTRATE AND ACETAMINOPHEN 1 TABLET: 10; 325 TABLET ORAL at 15:27

## 2023-12-29 RX ADMIN — PREGABALIN 50 MG: 50 CAPSULE ORAL at 15:27

## 2023-12-29 RX ADMIN — CEFEPIME HYDROCHLORIDE 1000 MG: 1 INJECTION, POWDER, FOR SOLUTION INTRAMUSCULAR; INTRAVENOUS at 21:00

## 2023-12-29 RX ADMIN — INSULIN LISPRO 2 UNITS: 100 INJECTION, SOLUTION INTRAVENOUS; SUBCUTANEOUS at 17:23

## 2023-12-29 RX ADMIN — Medication 1 CAPSULE: at 09:29

## 2023-12-29 RX ADMIN — HYDROCODONE BITARTRATE AND ACETAMINOPHEN 1 TABLET: 10; 325 TABLET ORAL at 09:29

## 2023-12-29 RX ADMIN — INSULIN LISPRO 4 UNITS: 100 INJECTION, SOLUTION INTRAVENOUS; SUBCUTANEOUS at 21:15

## 2023-12-29 RX ADMIN — PREGABALIN 50 MG: 50 CAPSULE ORAL at 09:39

## 2023-12-29 RX ADMIN — INSULIN GLARGINE 50 UNITS: 100 INJECTION, SOLUTION SUBCUTANEOUS at 08:48

## 2023-12-29 RX ADMIN — CHLORTHALIDONE 25 MG: 25 TABLET ORAL at 09:28

## 2023-12-29 RX ADMIN — Medication 10 ML: at 21:11

## 2023-12-29 RX ADMIN — FUROSEMIDE 20 MG: 20 TABLET ORAL at 09:28

## 2023-12-29 RX ADMIN — PREGABALIN 50 MG: 50 CAPSULE ORAL at 21:10

## 2023-12-29 RX ADMIN — AMLODIPINE BESYLATE 10 MG: 10 TABLET ORAL at 09:28

## 2023-12-29 RX ADMIN — PANTOPRAZOLE SODIUM 40 MG: 40 TABLET, DELAYED RELEASE ORAL at 09:28

## 2023-12-29 RX ADMIN — CEFEPIME HYDROCHLORIDE 1000 MG: 1 INJECTION, POWDER, FOR SOLUTION INTRAMUSCULAR; INTRAVENOUS at 08:47

## 2023-12-29 RX ADMIN — ONDANSETRON HYDROCHLORIDE 4 MG: 2 INJECTION, SOLUTION INTRAMUSCULAR; INTRAVENOUS at 08:48

## 2023-12-29 RX ADMIN — ATORVASTATIN CALCIUM 80 MG: 20 TABLET, FILM COATED ORAL at 21:10

## 2023-12-29 RX ADMIN — BISOPROLOL FUMARATE 5 MG: 5 TABLET, FILM COATED ORAL at 09:43

## 2023-12-29 RX ADMIN — TERAZOSIN HYDROCHLORIDE 1 MG: 1 CAPSULE ORAL at 21:10

## 2023-12-29 RX ADMIN — INSULIN LISPRO 3 UNITS: 100 INJECTION, SOLUTION INTRAVENOUS; SUBCUTANEOUS at 12:30

## 2023-12-29 RX ADMIN — HYDROCODONE BITARTRATE AND ACETAMINOPHEN 1 TABLET: 10; 325 TABLET ORAL at 21:10

## 2023-12-29 RX ADMIN — INSULIN LISPRO 4 UNITS: 100 INJECTION, SOLUTION INTRAVENOUS; SUBCUTANEOUS at 08:48

## 2023-12-29 RX ADMIN — LEVOTHYROXINE SODIUM 112 MCG: 112 TABLET ORAL at 06:31

## 2023-12-29 NOTE — THERAPY TREATMENT NOTE
Patient Name: Halie Guidry  : 1940    MRN: [id number]                              Today's Date: 2023       Admit Date: 2023    Visit Dx:     ICD-10-CM ICD-9-CM   1. Diabetic ulcer of left foot associated with type 2 diabetes mellitus, unspecified part of foot, unspecified ulcer stage  E11.621 250.80    L97.529 707.15   2. Type 2 diabetes mellitus with hyperglycemia, unspecified whether long term insulin use  E11.65 250.00   3. Hypertension, unspecified type  I10 401.9   4. Hyperlipidemia, unspecified hyperlipidemia type  E78.5 272.4   5. Hypothyroidism, unspecified type  E03.9 244.9   6. Acute renal failure superimposed on chronic kidney disease, unspecified acute renal failure type, unspecified CKD stage  N17.9 584.9    N18.9 585.9     Patient Active Problem List   Diagnosis    Compression fracture    Lumbar degenerative disc disease    Type 2 diabetes mellitus with hyperglycemia, with long-term current use of insulin    Hyperlipidemia    Benign essential hypertension    Primary hypothyroidism    Neuropathy    Osteoporosis    Proteinuria    Tobacco abuse    Diabetic eye exam    Generalized weakness    Spinal stenosis    Scoliosis    Arthritis    VBI (vertebrobasilar insufficiency)    Orthostatic hypotension    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    Severe hypothyroidism    Noncompliance with medication regimen    Vitamin D deficiency disease    Altered mental status    Tremor    Seizure    Stage 3a chronic kidney disease    Thoracic degenerative disc disease    Metabolic encephalopathy    VIPUL (acute kidney injury)    Hyperglycemia    Type II diabetes mellitus, uncontrolled    Lower abdominal pain    Transaminitis    COVID-19 virus detected    UTI (urinary tract infection)    Emphysematous cystitis    Choledocholithiasis    Hypokalemia    Hypoxia    Generalized abdominal pain    History of Clostridium difficile infection    History of ERCP    Acute UTI (urinary tract  infection)    Hypomagnesemia    Burning pain    Anxiety disorder    Neuropathic pain    Intertrigo    Weakness of both lower extremities    Accelerated hypertension    Acute cystitis without hematuria    Altered mental status, unspecified altered mental status type    Left lower lobe pneumonia    Acute pain of left foot    Cellulitis and abscess of left lower extremity    Hypertensive kidney disease with stage 3a chronic kidney disease    Bilateral leg pain    Peripheral edema    Primary malignant neuroendocrine tumor of pancreas    Encounter for long-term (current) use of high-risk medication    Diabetic foot ulcer     Past Medical History:   Diagnosis Date    Acute metabolic encephalopathy 6/24/2022    Anxiety     Arthritis     Benign essential hypertension 08/20/2014    Bleeding disorder     Depression     Diabetes     Diabetes mellitus, type 2     Disc degeneration, lumbar     Headache, tension-type     Hyperlipidemia     Hypothyroidism     Neuropathy     Osteoporosis 09/09/2015    Peripheral neuropathy     Rotator cuff tear, left     Scoliosis     Shoulder pain     LEFT, TORN ROTATOR CUFF S/P FALL    Spinal stenosis      Past Surgical History:   Procedure Laterality Date    APPENDECTOMY      BILATERAL BREAST REDUCTION Bilateral 08/2015    CATARACT EXTRACTION  03/2015    CHOLECYSTECTOMY WITH INTRAOPERATIVE CHOLANGIOGRAM N/A 3/27/2022    Procedure: CHOLECYSTECTOMY LAPAROSCOPIC INTRAOPERATIVE CHOLANGIOGRAM;  Surgeon: Aiyana Hill MD;  Location: [facility] MAIN OR;  Service: General;  Laterality: N/A;    COLONOSCOPY  06/05/2015    WNL    ERCP N/A 2/25/2022    Procedure: ENDOSCOPIC RETROGRADE CHOLANGIOPANCREATOGRAPHY with sphincterotomy and balloon sweep;  Surgeon: Chilo Wilhelm MD;  Location: [facility] ENDOSCOPY;  Service: Gastroenterology;  Laterality: N/A;  PRE/POST - CBD stones    ERCP N/A 3/28/2022    Procedure: ENDOSCOPIC RETROGRADE CHOLANGIOPANCREATOGRAPHY WITH SPHINCTEROTOMY AND BALLOON SWEEP;  Surgeon:  Chilo Wilhelm MD;  Location: [facility] ENDOSCOPY;  Service: Gastroenterology;  Laterality: N/A;  PRE: COMMON DUCT STONE  POST: COMMON DUCT STONE    KYPHOPLASTY      REDUCTION MAMMAPLASTY      TONSILLECTOMY        General Information       Row Name 12/29/23 4170          Physical Therapy Time and Intention    Document Type therapy note (daily note)  -SV     Mode of Treatment individual therapy;physical therapy  -SV       Row Name 12/29/23 4970          General Information    Patient Profile Reviewed yes  -SV               User Key  (r) = Recorded By, (t) = Taken By, (c) = Cosigned By      Initials Name Provider Type    SV Helene Hernandez, PT Physical Therapist                   Mobility       Row Name 12/29/23 1351          Bed Mobility    Supine-Sit [place] (Bed Mobility) contact guard  -SV     Assistive Device (Bed Mobility) bed rails;head of bed elevated  -SV       Row Name 12/29/23 1351          Bed-Chair Transfer    Bed-Chair [place] (Transfers) minimum assist (75% patient effort);moderate assist (50% patient effort)  -SV     Assistive Device (Bed-Chair Transfers) walker, front-wheeled  -SV     Comment, (Bed-Chair Transfer) impulsive, unable to come to full stand from eob, squat pivot with left hand on rwx and right reached for recliner despite vc  -SV       Row Name 12/29/23 6991          Sit-Stand Transfer    Sit-Stand [place] (Transfers) moderate assist (50% patient effort);minimum assist (75% patient effort)  -SV     Assistive Device (Sit-Stand Transfers) walker, 4-wheeled  -SV     Comment, (Sit-Stand Transfer) very labored and slow, stood with rwx and cga for 30-45 seconds cga  -SV               User Key  (r) = Recorded By, (t) = Taken By, (c) = Cosigned By      Initials Name Provider Type    SV Helene Hernandez, PT Physical Therapist                   Obj/Interventions       Row Name 12/29/23 6008          Motor Skills    Therapeutic Exercise --  educated in need for increased DF rom  kinsey: df with hold 5 3 reps , cued to increase 5 secs until able to hold for 20  -SV       Row Name 12/29/23 1352          Balance    Static Sitting Balance supervision  -SV     Static Standing Balance contact guard  -SV     Position/Device Used, Standing Balance walker, rolling  -SV               User Key  (r) = Recorded By, (t) = Taken By, (c) = Cosigned By      Initials Name Provider Type    SV Helene Hernandez, PT Physical Therapist                   Goals/Plan    No documentation.                  Clinical Impression    No documentation.                  Outcome Measures       Row Name 12/29/23 1354 12/29/23 0845       How much help from another person do you currently need...    Turning from your back to your side while in flat bed without using bedrails? 4  -SV 4  -HE    Moving from lying on back to sitting on the side of a flat bed without bedrails? 3  -SV 3  -HE    Moving to and from a bed to a chair (including a wheelchair)? 3  -SV 3  -HE    Standing up from a chair using your arms (e.g., wheelchair, bedside chair)? 2  -SV 2  -HE    Climbing 3-5 steps with a railing? 1  -SV 2  -HE    To walk in hospital room? 1  -SV 2  -HE    AM-PAC 6 Clicks Score (PT) 14  -SV 16  -HE    Highest Level of Mobility Goal 4 --> Transfer to chair/commode  -SV 5 --> Static standing  -HE              User Key  (r) = Recorded By, (t) = Taken By, (c) = Cosigned By      Initials Name Provider Type    SV Helene Hernandez, PT Physical Therapist    Nena Geronimo RN Registered Nurse                                 Physical Therapy Education       Title: PT OT SLP Therapies (Done)       Topic: Physical Therapy (Done)       Point: Mobility training (Done)       Learning Progress Summary             Patient Acceptance, E, VU,NR by SV at 12/29/2023 1354    Acceptance, E, VU by LW at 12/26/2023 1518                         Point: Home exercise program (Done)       Learning Progress Summary             Patient Acceptance, E, VU,NR by   at 12/29/2023 1354                         Point: Body mechanics (Done)       Learning Progress Summary             Patient Acceptance, E, VU by LW at 12/26/2023 1518                         Point: Precautions (Done)       Learning Progress Summary             Patient Acceptance, E, VU by LW at 12/26/2023 1518                                         User Key       Initials Effective Dates Name Provider Type Discipline    SV 07/11/23 -  eHlene Hernandez, PT Physical Therapist PT    LW 05/08/23 -  Ileana Da Silva PT Physical Therapist PT                  PT Recommendation and Plan     Plan of Care Reviewed With: patient     Time Calculation:         PT Charges       Row Name 12/29/23 1330             Time Calculation    Start Time 1330  -SV      Stop Time 1347  -SV      Time Calculation (min) 17 min  -SV      PT Received On 12/29/23  -SV      PT - Next Appointment 12/30/23  -SV                User Key  (r) = Recorded By, (t) = Taken By, (c) = Cosigned By      Initials Name Provider Type    SV Helene Hernandez, PT Physical Therapist                  Therapy Charges for Today       Code Description Service Date Service Provider Modifiers Qty    22551410397 HC PT THERAPEUTIC ACT EA 15 MIN 12/29/2023 Helene Hernandez, PT GP 1            PT G-Codes  Outcome Measure Options: AM-PAC 6 Clicks Basic Mobility (PT)  AM-PAC 6 Clicks Score (PT): 14       Helene Hernandez PT  12/29/2023

## 2023-12-29 NOTE — CASE MANAGEMENT/SOCIAL WORK
Continued Stay Note  [facility]     Patient Name: Halie Guidry  MRN: [id number]  Today's Date: 12/29/2023    Admit Date: 12/24/2023    Plan: [facility] via son to transport.   Discharge Plan       Row Name 12/29/23 1504       Plan    Plan [facility] son to transport.    Patient/Family in Agreement with Plan yes    Plan Comments Spoke with in AM, explained [facility] did not have beds availble; patient expressed understanding and gave [facility] permission for area referrals. Spoke with patient at bedside. Introduced self. Explained that [facility] will have bed available tomorrow. Patient voiced understanding and is agreeable. Pre-cert started.                   Discharge Codes    No documentation.                 Expected Discharge Date and Time       Expected Discharge Date Expected Discharge Time    Dec 30, 2023               Maribeth Khan RN

## 2023-12-29 NOTE — PLAN OF CARE
Goal Outcome Evaluation:  Plan of Care Reviewed With: patient      Pt alert and reported less nausea. Up to eob with sba labored and heavy use of railing. Pt STS with mod/minx1 impulsive with attempt to use rwx. Not to full stand with both hands on rwx before reaching for recliner armrest( despite vc). STS with modx1 from recliner and stood with rwx and cga for 30-45sec. Pt requires 24 hour asst. Recommend snf.

## 2023-12-29 NOTE — SIGNIFICANT NOTE
12/29/23 1842   Post Acute Pre-Cert Documentation   Request Submitted by Facility - Type: Hospital   Post-Acute Authorization Type Submitted: SNF   Date Post Acute Pre-Cert Inititated per Facility 12/29/23   Accepting Facility [facility]   Hospital Discharge Date Requested 12/30/23   All Clinicals Submitted? Yes   Had Accepting Facility at Time of Submission Yes   Response Communicated to:    Authorization Number: [id number]   Post Acute Pre-Cert Initiated Comment LUCY JUSTIN

## 2023-12-29 NOTE — PLAN OF CARE
Pt had an uneventful day. Did feel sick this morning, gave PRN zofran, did not eat breakfast. Felt better as the day went on. PT got her up to the chair where she has been for most of the day. Dressing changed, pt tolerated it well. No acute signs of distress noted. X3 side rails up, chair alarm on, bed in low locked position, call light within reach.     Problem: Fall Injury Risk  Goal: Absence of Fall and Fall-Related Injury  Outcome: Ongoing, Progressing  Intervention: Promote Injury-Free Environment  Recent Flowsheet Documentation  Taken 12/29/2023 1800 by Nena Del Cid RN  Safety Promotion/Fall Prevention:   nonskid shoes/slippers when out of bed   safety round/check completed   clutter free environment maintained  Taken 12/29/2023 1600 by Nena Del Cid RN  Safety Promotion/Fall Prevention:   nonskid shoes/slippers when out of bed   safety round/check completed   clutter free environment maintained  Taken 12/29/2023 1400 by Nena Del Cid RN  Safety Promotion/Fall Prevention:   nonskid shoes/slippers when out of bed   safety round/check completed   clutter free environment maintained  Taken 12/29/2023 1216 by Nena Del Cid RN  Safety Promotion/Fall Prevention:   nonskid shoes/slippers when out of bed   safety round/check completed   clutter free environment maintained  Taken 12/29/2023 1000 by Nena Del Cid RN  Safety Promotion/Fall Prevention:   nonskid shoes/slippers when out of bed   safety round/check completed   clutter free environment maintained  Taken 12/29/2023 0845 by Nena Del Cid RN  Safety Promotion/Fall Prevention:   nonskid shoes/slippers when out of bed   safety round/check completed   clutter free environment maintained     Problem: Infection  Goal: Absence of Infection Signs and Symptoms  Outcome: Ongoing, Progressing     Problem: Skin Injury Risk Increased  Goal: Skin Health and Integrity  Outcome: Ongoing, Progressing  Intervention: Optimize Skin Protection  Recent Flowsheet  Documentation  Taken 12/29/2023 1800 by Nena Del Cid RN  Head of Bed (HOB) Positioning: HOB elevated  Taken 12/29/2023 1600 by Nena Del Cid RN  Head of Bed (HOB) Positioning: HOB elevated  Taken 12/29/2023 1400 by Nena Del Cid RN  Head of Bed (HOB) Positioning: HOB elevated  Taken 12/29/2023 1216 by Nena Del Cid RN  Head of Bed (HOB) Positioning: HOB elevated  Taken 12/29/2023 1000 by Nena Del Cid RN  Head of Bed (HOB) Positioning: HOB elevated  Taken 12/29/2023 0845 by Nena Del Cid RN  Head of Bed (HOB) Positioning: HOB elevated   Goal Outcome Evaluation:

## 2023-12-29 NOTE — PROGRESS NOTES
Name: Halie Guidry ADMIT: 2023   : 1940  PCP: Napoleon Mead MD    MRN: [id number] LOS: 5 days   AGE/SEX: 83 y.o. female  ROOM: [facility]     Subjective   Subjective   Patient sitting up in recliner.   Had some nausea earlier this morning, but this has resolved.    Review of Systems   Constitutional:  Negative for chills and fever.   Respiratory:  Negative for cough and shortness of breath.    Cardiovascular:  Negative for chest pain and leg swelling.   Gastrointestinal:  Negative for abdominal pain, diarrhea and nausea.     As above     Objective   Objective   Vital Signs  Temp:  [97.6 °F (36.4 °C)-100.9 °F (38.3 °C)] 98.4 °F (36.9 °C)  Heart Rate:  [66-92] 72  Resp:  [16] 16  BP: (136-162)/(60-81) 149/71  SpO2:  [91 %-95 %] 94 %  on   ;   Device (Oxygen Therapy): room air  Body mass index is 31.88 kg/m².  Physical Exam  Constitutional:       General: She is not in acute distress.     Appearance: She is not ill-appearing.   Cardiovascular:      Rate and Rhythm: Normal rate and regular rhythm.   Pulmonary:      Effort: Pulmonary effort is normal. No respiratory distress.   Abdominal:      General: Abdomen is flat. There is no distension.      Tenderness: There is no abdominal tenderness.   Musculoskeletal:         General: No swelling or deformity. Normal range of motion.   Skin:     General: Skin is warm and dry.      Comments: Ulcer on the plantar left great toe.  Evidence of bilateral redness consistent with bilateral venous stasis dermatitis.   Neurological:      General: No focal deficit present.      Mental Status: She is alert. Mental status is at baseline.         Results Review     I reviewed the patient's new clinical results.  Results from last 7 days   Lab Units 23  0552 23  0633 23  0705 23  0922   WBC 10*3/mm3 11.91* 8.12 8.28 8.32   HEMOGLOBIN g/dL 11.7* 10.3* 10.7* 10.8*   PLATELETS 10*3/mm3 288 258 273 303     Results from last 7 days   Lab Units  12/29/23  0552 12/28/23  0633 12/27/23  0705 12/26/23  0922   SODIUM mmol/L 134* 133* 133* 137   POTASSIUM mmol/L 3.7 3.8 3.8 4.3   CHLORIDE mmol/L 99 101 102 102   CO2 mmol/L 22.0 21.5* 21.0* 21.0*   BUN mg/dL 21 20 25* 24*   CREATININE mg/dL 1.27* 1.12* 1.13* 1.19*   GLUCOSE mg/dL 265* 284* 270* 279*   Estimated Creatinine Clearance: 40.5 mL/min (A) (by C-G formula based on SCr of 1.27 mg/dL (H)).  Results from last 7 days   Lab Units 12/24/23 2026   ALBUMIN g/dL 3.6   BILIRUBIN mg/dL 0.3   ALK PHOS U/L 114   AST (SGOT) U/L 12   ALT (SGPT) U/L 7     Results from last 7 days   Lab Units 12/29/23  0552 12/28/23  0633 12/27/23  0705 12/26/23  0922 12/24/23 2026   CALCIUM mg/dL 9.0 8.4* 8.5* 8.8 8.9   ALBUMIN g/dL  --   --   --   --  3.6     Results from last 7 days   Lab Units 12/25/23  0554 12/24/23  2258 12/24/23 2026   PROCALCITONIN ng/mL  --   --  0.07   LACTATE mmol/L 1.8 2.6* 2.2*     COVID19   Date Value Ref Range Status   05/13/2023 Not Detected Not Detected - Ref. Range Final   11/03/2022 Not Detected Not Detected - Ref. Range Final     Glucose   Date/Time Value Ref Range Status   12/29/2023 1228 218 (H) 70 - 130 mg/dL Final   12/29/2023 1117 253 (H) 70 - 130 mg/dL Final   12/28/2023 2024 293 (H) 70 - 130 mg/dL Final   12/28/2023 1604 242 (H) 70 - 130 mg/dL Final   12/28/2023 1231 270 (H) 70 - 130 mg/dL Final   12/27/2023 2043 284 (H) 70 - 130 mg/dL Final   12/27/2023 1619 234 (H) 70 - 130 mg/dL Final       MRI Foot Left Without Contrast  Narrative: Patient: TABATHA CARTER  Time Out: 16:46  Exam(s): MRI LEFT FOOT Without Contrast     EXAM:  MR Left Lower Extremity Without Intravenous Contrast, Foot    CLINICAL HISTORY:  Reason for exam: diabetic ulcer.    TECHNIQUE:  Multiplanar magnetic resonance images of the left foot without   intravenous contrast.    COMPARISON:  Left foot radiographs 12 24 23    FINDINGS:  Skin markers in place along the plantar aspect of the foot at the level   of the  interspace between the distal first and second metatarsal shafts.    No focal soft tissue abnormalities noted in this location.    There is a soft tissue ulcer along the plantar-medial aspect of the first   metatarsal head.    There is generalized subcutaneous edema of the foot which may represent   cellulitis.  No organized fluid collection is identified to suggest   abscess.    There are no characteristic marrow signal changes to suggest acute   osteomyelitis.  There is no imaging evidence of septic arthritis.    Osteoarthritic changes noted at the first MTP joint.  There is no   fracture or dislocation.    IMPRESSION:     1.  Soft tissue ulcer at the plantar-medial aspect of the first MTP joint.    Generalized subcutaneous edema cellulitis.  2.  No evidence of acute osteomyelitis.  Impression: Electronically signed by Haley Conley M.D. on 12-25-23 at 1646    I reviewed the patient's daily medications.  Scheduled Medications  amLODIPine, 10 mg, Oral, Daily  atorvastatin, 80 mg, Oral, Nightly  bisoprolol, 5 mg, Oral, Daily  cefepime, 1,000 mg, Intravenous, Q12H  chlorthalidone, 25 mg, Oral, Daily  DULoxetine, 30 mg, Oral, Daily  furosemide, 20 mg, Oral, Daily  HYDROcodone-acetaminophen, 1 tablet, Oral, TID  insulin glargine, 50 Units, Subcutaneous, Daily  insulin lispro, 2-7 Units, Subcutaneous, 4x Daily AC & at Bedtime  lactobacillus acidophilus, 1 capsule, Oral, Daily  levothyroxine, 112 mcg, Oral, Q AM  lisinopril, 40 mg, Oral, Daily  pantoprazole, 40 mg, Oral, BID AC  pregabalin, 50 mg, Oral, TID  sodium chloride, 10 mL, Intravenous, Q12H  sodium chloride, 10 mL, Intravenous, Q12H  terazosin, 1 mg, Oral, Nightly    Infusions     Diet  Diet: Diabetic Diets; Consistent Carbohydrate; Texture: Regular Texture (IDDSI 7); Fluid Consistency: Thin (IDDSI 0)         I have personally reviewed:  [x]  Laboratory   [x]  Microbiology   [x]  Radiology   []  EKG/Telemetry   []  Cardiology/Vascular   []  Pathology   []   Records     Assessment/Plan     Active Hospital Problems    Diagnosis  POA   • **Diabetic foot ulcer [E11.621, L97.509]  Yes   • Primary malignant neuroendocrine tumor of pancreas [C7A.8]  Yes   • Anxiety disorder [F41.9]  Yes   • Type 2 diabetes mellitus with hyperglycemia, with long-term current use of insulin [E11.65, Z79.4]  Not Applicable   • Primary hypothyroidism [E03.9]  Yes   • Hyperlipidemia [E78.5]  Yes   • Benign essential hypertension [I10]  Yes   • Stage 3a chronic kidney disease [N18.31]  Yes      Resolved Hospital Problems   No resolved problems to display.       83 y.o. female admitted with Diabetic foot ulcer.      Acute on chronic diabetic foot ulcer  Chronic venous stasis dermatitis  -Cefepime and vancomycin  -Wound culture from admission growing multidrug-resistant Enterobacter as well as MSSA  -MRI foot soft tissue ulcer of first MTP joint, no osteomyelitis  -Podiatry consulted, performed bedside I&D and sent for culture.  Weightbearing as tolerated in postop shoe.  -Wound culture growing multidrug-resistant Enterobacter  -ID consulted-plan for midline and IV cefepime 1g twice daily for total of 10 days.  Plan for probiotic with history of C. difficile     Hypertension-continue home medications     Diabetes type 2 on chronic insulin, A1c 8.2%  -glargine to 45 unitis sliding scale insulin, titrate as needed     Hypothyroidism-continue home Synthroid     Neuroendocrine tumor of the pancreas-follows oncology as an outpatient.  Currently getting octreotide injections monthly.     CKD 3 stage IIIa, stable, baseline    SCDs for DVT prophylaxis.  Full code.  Discussed with patient and nursing staff.  Anticipate discharge to SNU facility tomorrow.family to transport      Expected Discharge Date: 12/30/2023; Expected Discharge Time:       Cedrick Mendoza MD  [facility]  12/29/23  16:31 EST

## 2023-12-29 NOTE — PLAN OF CARE
Problem: Fall Injury Risk  Goal: Absence of Fall and Fall-Related Injury  Outcome: Ongoing, Progressing  Intervention: Identify and Manage Contributors  Recent Flowsheet Documentation  Taken 12/29/2023 0600 by Tala Lyon RN  Medication Review/Management: medications reviewed  Taken 12/29/2023 0200 by Tala Lyon RN  Medication Review/Management: medications reviewed  Taken 12/28/2023 2149 by Tala Lyon RN  Medication Review/Management: medications reviewed  Taken 12/28/2023 1945 by Tala Lyon RN  Medication Review/Management: medications reviewed  Intervention: Promote Injury-Free Environment  Recent Flowsheet Documentation  Taken 12/29/2023 0600 by Tala Lyon RN  Safety Promotion/Fall Prevention:   safety round/check completed   lighting adjusted   clutter free environment maintained   assistive device/personal items within reach  Taken 12/29/2023 0411 by Tala Lyon RN  Safety Promotion/Fall Prevention:   safety round/check completed   lighting adjusted   clutter free environment maintained   assistive device/personal items within reach  Taken 12/29/2023 0200 by Tala Lyon RN  Safety Promotion/Fall Prevention:   safety round/check completed   lighting adjusted   clutter free environment maintained   assistive device/personal items within reach  Taken 12/29/2023 0000 by Tala Lyon RN  Safety Promotion/Fall Prevention:   safety round/check completed   lighting adjusted   clutter free environment maintained   assistive device/personal items within reach  Taken 12/28/2023 2149 by Tala Lyon RN  Safety Promotion/Fall Prevention:   safety round/check completed   lighting adjusted   clutter free environment maintained   assistive device/personal items within reach  Taken 12/28/2023 2100 by Tala Lyon RN  Safety Promotion/Fall Prevention:   safety round/check completed   lighting adjusted  Taken 12/28/2023 1945 by Tala Lyon, RN  Safety  Promotion/Fall Prevention:   safety round/check completed   lighting adjusted     Problem: Infection  Goal: Absence of Infection Signs and Symptoms  Outcome: Ongoing, Progressing     Problem: Skin Injury Risk Increased  Goal: Skin Health and Integrity  Outcome: Ongoing, Progressing   Goal Outcome Evaluation:      Plan of care ongoing, waiting for rehab placement    She is alert and oriented times 4. dressing to left foot clean dry and intact, left midline in place for home IV abx. Purewick and brief on due to occasional incontinence.  Ivf continue, Call light within reach, safety precautions in place.

## 2023-12-30 VITALS
DIASTOLIC BLOOD PRESSURE: 60 MMHG | SYSTOLIC BLOOD PRESSURE: 131 MMHG | BODY MASS INDEX: 31.77 KG/M2 | WEIGHT: 209.6 LBS | OXYGEN SATURATION: 94 % | TEMPERATURE: 98.7 F | HEART RATE: 65 BPM | RESPIRATION RATE: 16 BRPM | HEIGHT: 68 IN

## 2023-12-30 LAB
ANION GAP SERPL CALCULATED.3IONS-SCNC: 12.6 MMOL/L (ref 5–15)
BACTERIA SPEC AEROBE CULT: ABNORMAL
BUN SERPL-MCNC: 26 MG/DL (ref 8–23)
BUN/CREAT SERPL: 22.2 (ref 7–25)
CALCIUM SPEC-SCNC: 8.8 MG/DL (ref 8.6–10.5)
CHLORIDE SERPL-SCNC: 97 MMOL/L (ref 98–107)
CO2 SERPL-SCNC: 24.4 MMOL/L (ref 22–29)
CREAT SERPL-MCNC: 1.17 MG/DL (ref 0.57–1)
DEPRECATED RDW RBC AUTO: 42.5 FL (ref 37–54)
EGFRCR SERPLBLD CKD-EPI 2021: 46.4 ML/MIN/1.73
ERYTHROCYTE [DISTWIDTH] IN BLOOD BY AUTOMATED COUNT: 14.6 % (ref 12.3–15.4)
GLUCOSE BLDC GLUCOMTR-MCNC: 227 MG/DL (ref 70–130)
GLUCOSE BLDC GLUCOMTR-MCNC: 244 MG/DL (ref 70–130)
GLUCOSE BLDC GLUCOMTR-MCNC: 250 MG/DL (ref 70–130)
GLUCOSE BLDC GLUCOMTR-MCNC: 284 MG/DL (ref 70–130)
GLUCOSE SERPL-MCNC: 254 MG/DL (ref 65–99)
GRAM STN SPEC: ABNORMAL
GRAM STN SPEC: ABNORMAL
HCT VFR BLD AUTO: 31.8 % (ref 34–46.6)
HGB BLD-MCNC: 10.3 G/DL (ref 12–15.9)
MCH RBC QN AUTO: 26.1 PG (ref 26.6–33)
MCHC RBC AUTO-ENTMCNC: 32.4 G/DL (ref 31.5–35.7)
MCV RBC AUTO: 80.7 FL (ref 79–97)
PLATELET # BLD AUTO: 265 10*3/MM3 (ref 140–450)
PMV BLD AUTO: 9.6 FL (ref 6–12)
POTASSIUM SERPL-SCNC: 4.1 MMOL/L (ref 3.5–5.2)
RBC # BLD AUTO: 3.94 10*6/MM3 (ref 3.77–5.28)
SODIUM SERPL-SCNC: 134 MMOL/L (ref 136–145)
WBC NRBC COR # BLD AUTO: 10.24 10*3/MM3 (ref 3.4–10.8)

## 2023-12-30 PROCEDURE — 96372 THER/PROPH/DIAG INJ SC/IM: CPT

## 2023-12-30 PROCEDURE — 82948 REAGENT STRIP/BLOOD GLUCOSE: CPT

## 2023-12-30 PROCEDURE — 63710000001 INSULIN LISPRO (HUMAN) PER 5 UNITS: Performed by: STUDENT IN AN ORGANIZED HEALTH CARE EDUCATION/TRAINING PROGRAM

## 2023-12-30 PROCEDURE — 25010000002 CEFEPIME PER 500 MG: Performed by: INTERNAL MEDICINE

## 2023-12-30 PROCEDURE — 85027 COMPLETE CBC AUTOMATED: CPT | Performed by: STUDENT IN AN ORGANIZED HEALTH CARE EDUCATION/TRAINING PROGRAM

## 2023-12-30 PROCEDURE — 63710000001 INSULIN GLARGINE PER 5 UNITS: Performed by: STUDENT IN AN ORGANIZED HEALTH CARE EDUCATION/TRAINING PROGRAM

## 2023-12-30 PROCEDURE — G0378 HOSPITAL OBSERVATION PER HR: HCPCS

## 2023-12-30 PROCEDURE — 25010000002 ENOXAPARIN PER 10 MG: Performed by: STUDENT IN AN ORGANIZED HEALTH CARE EDUCATION/TRAINING PROGRAM

## 2023-12-30 PROCEDURE — 80048 BASIC METABOLIC PNL TOTAL CA: CPT | Performed by: STUDENT IN AN ORGANIZED HEALTH CARE EDUCATION/TRAINING PROGRAM

## 2023-12-30 RX ORDER — L.ACID,PARA/B.BIFIDUM/S.THERM 8B CELL
1 CAPSULE ORAL DAILY
Qty: 14 CAPSULE | Refills: 0 | Status: SHIPPED | OUTPATIENT
Start: 2023-12-31 | End: 2023-12-30 | Stop reason: SDUPTHER

## 2023-12-30 RX ORDER — INSULIN LISPRO 100 [IU]/ML
5 INJECTION, SOLUTION INTRAVENOUS; SUBCUTANEOUS
Qty: 30 ML | Refills: 0 | Status: SHIPPED | OUTPATIENT
Start: 2023-12-30

## 2023-12-30 RX ORDER — L.ACID,PARA/B.BIFIDUM/S.THERM 8B CELL
1 CAPSULE ORAL DAILY
Qty: 14 CAPSULE | Refills: 0 | Status: SHIPPED | OUTPATIENT
Start: 2023-12-31 | End: 2024-01-11

## 2023-12-30 RX ORDER — HYDROCODONE BITARTRATE AND ACETAMINOPHEN 10; 325 MG/1; MG/1
1 TABLET ORAL 3 TIMES DAILY
Qty: 9 TABLET | Refills: 0 | Status: SHIPPED | OUTPATIENT
Start: 2023-12-30 | End: 2024-01-02

## 2023-12-30 RX ORDER — AMLODIPINE BESYLATE 10 MG/1
10 TABLET ORAL DAILY
Start: 2023-12-30

## 2023-12-30 RX ORDER — INSULIN LISPRO 100 [IU]/ML
2-7 INJECTION, SOLUTION INTRAVENOUS; SUBCUTANEOUS
Start: 2023-12-30

## 2023-12-30 RX ADMIN — HYDROCODONE BITARTRATE AND ACETAMINOPHEN 1 TABLET: 10; 325 TABLET ORAL at 15:26

## 2023-12-30 RX ADMIN — INSULIN LISPRO 4 UNITS: 100 INJECTION, SOLUTION INTRAVENOUS; SUBCUTANEOUS at 13:22

## 2023-12-30 RX ADMIN — AMLODIPINE BESYLATE 10 MG: 10 TABLET ORAL at 09:11

## 2023-12-30 RX ADMIN — FUROSEMIDE 20 MG: 20 TABLET ORAL at 09:11

## 2023-12-30 RX ADMIN — PANTOPRAZOLE SODIUM 40 MG: 40 TABLET, DELAYED RELEASE ORAL at 18:10

## 2023-12-30 RX ADMIN — ENOXAPARIN SODIUM 100 MG: 100 INJECTION SUBCUTANEOUS at 09:11

## 2023-12-30 RX ADMIN — DULOXETINE HYDROCHLORIDE 30 MG: 30 CAPSULE, DELAYED RELEASE ORAL at 09:11

## 2023-12-30 RX ADMIN — ACETAMINOPHEN 650 MG: 325 TABLET, FILM COATED ORAL at 06:07

## 2023-12-30 RX ADMIN — Medication 10 ML: at 09:13

## 2023-12-30 RX ADMIN — LEVOTHYROXINE SODIUM 112 MCG: 112 TABLET ORAL at 06:07

## 2023-12-30 RX ADMIN — INSULIN LISPRO 3 UNITS: 100 INJECTION, SOLUTION INTRAVENOUS; SUBCUTANEOUS at 18:10

## 2023-12-30 RX ADMIN — PREGABALIN 50 MG: 50 CAPSULE ORAL at 09:11

## 2023-12-30 RX ADMIN — BISOPROLOL FUMARATE 5 MG: 5 TABLET, FILM COATED ORAL at 09:11

## 2023-12-30 RX ADMIN — INSULIN GLARGINE 50 UNITS: 100 INJECTION, SOLUTION SUBCUTANEOUS at 09:11

## 2023-12-30 RX ADMIN — Medication 1 CAPSULE: at 09:11

## 2023-12-30 RX ADMIN — CHLORTHALIDONE 25 MG: 25 TABLET ORAL at 09:11

## 2023-12-30 RX ADMIN — LISINOPRIL 40 MG: 40 TABLET ORAL at 09:11

## 2023-12-30 RX ADMIN — PANTOPRAZOLE SODIUM 40 MG: 40 TABLET, DELAYED RELEASE ORAL at 09:10

## 2023-12-30 RX ADMIN — PREGABALIN 50 MG: 50 CAPSULE ORAL at 15:26

## 2023-12-30 RX ADMIN — CEFEPIME HYDROCHLORIDE 1000 MG: 1 INJECTION, POWDER, FOR SOLUTION INTRAMUSCULAR; INTRAVENOUS at 09:10

## 2023-12-30 RX ADMIN — INSULIN LISPRO 4 UNITS: 100 INJECTION, SOLUTION INTRAVENOUS; SUBCUTANEOUS at 09:11

## 2023-12-30 RX ADMIN — HYDROCODONE BITARTRATE AND ACETAMINOPHEN 1 TABLET: 10; 325 TABLET ORAL at 09:11

## 2023-12-30 NOTE — PROGRESS NOTES
"[facility] Clinical Pharmacy Services: Enoxaparin Consult    Halie Guidry has a pharmacy consult to dose full-dose enoxaparin per Cedrick Mendoza MD 's request.     Indication: Other - full anticoagulation, Elevated d-dimer. Empiric treatment until CTA done.   Home Anticoagulation: None     Relevant clinical data and objective history reviewed:  83 y.o. female 172.7 cm (67.99\") 95.1 kg (209 lb 9.6 oz)   Body mass index is 31.88 kg/m².   Results from last 7 days   Lab Units 12/29/23  0552   PLATELETS 10*3/mm3 288     Estimated Creatinine Clearance: 40.5 mL/min (A) (by C-G formula based on SCr of 1.27 mg/dL (H)).    Assessment/Plan    Will start patient on  100mg (1mg/kg) subcutaneous every 12 hours, adjusted for renal function. Consult order will be discontinued but pharmacy will continue to follow.     Jean Marie Chavez [facility]  Clinical Pharmacist    "

## 2023-12-30 NOTE — DISCHARGE SUMMARY
Patient Name: Halie Guidry  : 1940  MRN: [id number]    Date of Admission: 2023  Date of Discharge:  2023  Primary Care Physician: Napoleon Mead MD      Chief Complaint:   Foot Pain      Discharge Diagnoses     Active Hospital Problems    Diagnosis  POA    **Diabetic foot ulcer [E11.621, L97.509]  Yes    Primary malignant neuroendocrine tumor of pancreas [C7A.8]  Yes    Anxiety disorder [F41.9]  Yes    Type 2 diabetes mellitus with hyperglycemia, with long-term current use of insulin [E11.65, Z79.4]  Not Applicable    Primary hypothyroidism [E03.9]  Yes    Hyperlipidemia [E78.5]  Yes    Benign essential hypertension [I10]  Yes    Stage 3a chronic kidney disease [N18.31]  Yes      Resolved Hospital Problems   No resolved problems to display.        Hospital Course     Ms. Guidry is a 83 y.o. female with a history of hypertension, diabetes type 2, hypothyroidism, neuroendocrine tumor of the pancreas, CKD stage IIIa presenting with acute on chronic left foot diabetic foot ulcer.  Podiatry was consulted and did a small I&D of the wound.  Wound cultures growing multidrug-resistant Enterobacter.  ID was consulted and plan for IV cefepime 1 g twice daily for 10 days after I&D.  With history of C. difficile probiotic was started.  Left midline was placed.  Okay to discontinue after antibiotics are done.    Patient is on glargine 90 units and lispro 20 units with meals at home.  Patient eating significantly less than she does at home and will discharge on glargine 60 units daily, lispro 5 units with meals and sliding scale.  Can be uptitrated closer to her home regimen as her appetite returns.  Amlodipine was also increased from 5 mg to 10 mg this admission.    At the time of discharge patient was told to take all medications as prescribed, keep all follow-up appointments, and call their doctor or return to the hospital with any worsening or concerning symptoms.    Day of Discharge      Subjective:  Patient lying in bed.  Did not sleep well overnight.  No fevers or chills.  Discussed discharge plan later today.    Review of Systems   Constitutional:  Negative for chills and fever.   Respiratory:  Negative for cough and shortness of breath.    Cardiovascular:  Negative for chest pain and leg swelling.   Gastrointestinal:  Negative for abdominal pain, diarrhea and nausea.       Physical Exam:  Temp:  [97.6 °F (36.4 °C)-100 °F (37.8 °C)] 98.4 °F (36.9 °C)  Heart Rate:  [66-72] 67  Resp:  [16] 16  BP: (135-172)/(49-76) 135/49  Body mass index is 31.88 kg/m².  Physical Exam  Constitutional:       General: She is not in acute distress.     Appearance: She is not ill-appearing.   Cardiovascular:      Rate and Rhythm: Normal rate and regular rhythm.   Pulmonary:      Effort: Pulmonary effort is normal. No respiratory distress.   Abdominal:      General: Abdomen is flat. There is no distension.      Tenderness: There is no abdominal tenderness.   Musculoskeletal:         General: No swelling or deformity. Normal range of motion.   Skin:     General: Skin is warm and dry.      Comments: Ulcer on the plantar left great toe.  Evidence of bilateral redness consistent with bilateral venous stasis dermatitis.   Neurological:      General: No focal deficit present.      Mental Status: She is alert. Mental status is at baseline.   Consultants     Consult Orders (all) (From admission, onward)       Start     Ordered    12/28/23 0857  IV TEAM - Consult for Midline Placement (IV Team to Determine Number of Lumens)  Once        Provider:  (Not yet assigned)    12/28/23 0856    12/27/23 1537  Inpatient Infectious Diseases Consult  Once        Specialty:  Infectious Diseases  Provider:  Lei Rodriguez MD    12/27/23 1537    12/26/23 1430  Inpatient Podiatry Consult  Once        Specialty:  Podiatry  Provider:  Everton Ross DPM    12/26/23 1429    12/25/23 0009  Inpatient Case Management Social  Services Consult  Once        Provider:  (Not yet assigned)    12/25/23 0008    12/24/23 2127  [facility] (on-call MD unless specified) Details  Once        Specialty:  Hospitalist  Provider:  (Not yet assigned)    12/24/23 2126                  Procedures     Imaging Results (All)       Procedure Component Value Units Date/Time    MRI Foot Left Without Contrast [[id number]] Collected: 12/25/23 1647     Updated: 12/25/23 1647    Narrative:        Patient: TABATHA CARTER  Time Out: 16:46  Exam(s): MRI LEFT FOOT Without Contrast     EXAM:  MR Left Lower Extremity Without Intravenous Contrast, Foot    CLINICAL HISTORY:  Reason for exam: diabetic ulcer.    TECHNIQUE:  Multiplanar magnetic resonance images of the left foot without   intravenous contrast.    COMPARISON:  Left foot radiographs 12 24 23    FINDINGS:  Skin markers in place along the plantar aspect of the foot at the level   of the interspace between the distal first and second metatarsal shafts.    No focal soft tissue abnormalities noted in this location.    There is a soft tissue ulcer along the plantar-medial aspect of the first   metatarsal head.    There is generalized subcutaneous edema of the foot which may represent   cellulitis.  No organized fluid collection is identified to suggest   abscess.    There are no characteristic marrow signal changes to suggest acute   osteomyelitis.  There is no imaging evidence of septic arthritis.    Osteoarthritic changes noted at the first MTP joint.  There is no   fracture or dislocation.    IMPRESSION:     1.  Soft tissue ulcer at the plantar-medial aspect of the first MTP joint.    Generalized subcutaneous edema cellulitis.  2.  No evidence of acute osteomyelitis.      Impression:          Electronically signed by Haley Conley M.D. on 12-25-23 at 1646    XR Foot 3+ View Left [[id number]] Collected: 12/24/23 2004     Updated: 12/24/23 2009    Narrative:      3 VIEWS LEFT FOOT     HISTORY: Diabetic ulcer    "  COMPARISON: None available.     FINDINGS:  The patient has a focal soft tissue defect seen along the medial and  plantar aspect of the foot at the level of the metatarsal phalangeal  joint of the great toe. No aggressive osseous abnormalities are seen. No  radiopaque retained foreign body is identified. No acute fracture or  subluxation is identified, although the exam is degraded by the  patient's osteopenia. There is edema of the left foot.       Impression:      No definite cortical breakdown is seen to suggest osteomyelitis,  although MRI would be more sensitive for assessment.     This report was finalized on 12/24/2023 8:06 PM by Dr. Kristina Forman M.D on Workstation: [id number]               Pertinent Labs     Results from last 7 days   Lab Units 12/30/23  0649 12/29/23  0552 12/28/23  0633 12/27/23  0705   WBC 10*3/mm3 10.24 11.91* 8.12 8.28   HEMOGLOBIN g/dL 10.3* 11.7* 10.3* 10.7*   PLATELETS 10*3/mm3 265 288 258 273     Results from last 7 days   Lab Units 12/30/23  0721 12/29/23  0552 12/28/23  0633 12/27/23  0705   SODIUM mmol/L 134* 134* 133* 133*   POTASSIUM mmol/L 4.1 3.7 3.8 3.8   CHLORIDE mmol/L 97* 99 101 102   CO2 mmol/L 24.4 22.0 21.5* 21.0*   BUN mg/dL 26* 21 20 25*   CREATININE mg/dL 1.17* 1.27* 1.12* 1.13*   GLUCOSE mg/dL 254* 265* 284* 270*   Estimated Creatinine Clearance: 43.9 mL/min (A) (by C-G formula based on SCr of 1.17 mg/dL (H)).  Results from last 7 days   Lab Units 12/24/23 2026   ALBUMIN g/dL 3.6   BILIRUBIN mg/dL 0.3   ALK PHOS U/L 114   AST (SGOT) U/L 12   ALT (SGPT) U/L 7     Results from last 7 days   Lab Units 12/30/23  0721 12/29/23  0552 12/28/23  0633 12/27/23  0705 12/26/23  0922 12/24/23 2026   CALCIUM mg/dL 8.8 9.0 8.4* 8.5*   < > 8.9   ALBUMIN g/dL  --   --   --   --   --  3.6    < > = values in this interval not displayed.               Invalid input(s): \"LDLCALC\"  Results from last 7 days   Lab Units 12/27/23  1329 12/25/23  1752 12/24/23 2026 " 12/24/23 2025 12/24/23 2007   BLOODCX   --   --  No growth at 5 days  --  No growth at 5 days   WOUNDCX  Scant growth (1+) Enterobacter cloacae complex*  --   --  Rare Enterobacter cloacae complex*  Scant growth (1+) Staphylococcus aureus*  Scant growth (1+) Normal Skin Yoana  --    URINECX   --  No growth  --   --   --        Test Results Pending at Discharge     Pending Labs       Order Current Status    CANDIDA AURIS SCREEN - Swab, Axilla Right, Axilla Left and Groin Preliminary result            Discharge Details        Discharge Medications        New Medications        Instructions Start Date   cefepime 1000 mg IVPB in 100 mL NS (VTB)   1,000 mg, Intravenous, Every 12 Hours      insulin lispro 100 UNIT/ML injection  Commonly known as: HUMALOG/ADMELOG   2-7 Units, Subcutaneous, 4 Times Daily Before Meals & Nightly      Insulin Lispro (1 Unit Dial) 100 UNIT/ML solution pen-injector  Commonly known as: HumaLOG KwikPen  Replaces: insulin lispro 100 UNIT/ML injection   Inject 5 Units under the skin into the appropriate area as directed 3 (Three) Times a Day Before Meals. Give 20 units plus 2 units per 50 over 150 mg/dl before meals. Max daily dose of 90 units.      lactobacillus acidophilus capsule capsule   1 capsule, Oral, Daily   Start Date: December 31, 2023            Changes to Medications        Instructions Start Date   amLODIPine 10 MG tablet  Commonly known as: NORVASC  What changed:   medication strength  how much to take   10 mg, Oral, Daily      Insulin Glargine (1 Unit Dial) 300 UNIT/ML solution pen-injector injection  Commonly known as: TOUJEO  What changed: how much to take   60 Units, Subcutaneous, Daily             Continue These Medications        Instructions Start Date   atorvastatin 80 MG tablet  Commonly known as: LIPITOR   TAKE 1 TABLET EVERY NIGHT      BD Pen Needle Naila 2nd Gen 32G X 4 MM misc  Generic drug: Insulin Pen Needle   USE TO INJECT INSULIN FOUR TIMES DAILY      bisoprolol 5  MG tablet  Commonly known as: ZEBeta   5 mg, Oral, Daily      chlorthalidone 25 MG tablet  Commonly known as: HYGROTON   25 mg, Oral, Daily      cyanocobalamin 1000 MCG tablet  Commonly known as: VITAMIN B-12   1,000 mcg, Oral, Daily      dicyclomine 20 MG tablet  Commonly known as: BENTYL   1 tablet, Oral, 3 Times Daily PRN      doxazosin 1 MG tablet  Commonly known as: CARDURA   No dose, route, or frequency recorded.      DULoxetine 30 MG capsule  Commonly known as: CYMBALTA   30 mg, Oral, Daily      furosemide 20 MG tablet  Commonly known as: LASIX   20 mg, Oral, Daily      HYDROcodone-acetaminophen  MG per tablet  Commonly known as: NORCO   1 tablet, Oral, 3 times daily      hydrocortisone-zinc oxide-bacitracin-nystatin cream   1 application , Topical, 2 Times Daily PRN      lansoprazole 30 MG capsule  Commonly known as: PREVACID   30 mg, Oral, Daily      levothyroxine 112 MCG tablet  Commonly known as: SYNTHROID, LEVOTHROID   levothyroxine 112 mcg 1 tablet daily, except on Sunday take 1.5 tablets.      lisinopril 40 MG tablet  Commonly known as: PRINIVIL,ZESTRIL   40 mg, Oral, Daily      pregabalin 50 MG capsule  Commonly known as: LYRICA   50 mg, Oral, 3 Times Daily             Stop These Medications      insulin lispro 100 UNIT/ML injection  Commonly known as: HUMALOG/ADMELOG  Replaced by: Insulin Lispro (1 Unit Dial) 100 UNIT/ML solution pen-injector              Allergies   Allergen Reactions    Sulfa Antibiotics Unknown - High Severity     Pt says it was a long time ago and I don't remember but it wasn't good.          Discharge Disposition:  Skilled Nursing Facility (DC - External)    Discharge Diet:  Diet Order   Procedures    Diet: Diabetic Diets; Consistent Carbohydrate; Texture: Regular Texture (IDDSI 7); Fluid Consistency: Thin (IDDSI 0)       Discharge Activity:   As tolerated    CODE STATUS:    Code Status and Medical Interventions:   Ordered at: 12/24/23 9522     Code Status (Patient has no  pulse and is not breathing):    CPR (Attempt to Resuscitate)     Medical Interventions (Patient has pulse or is breathing):    Full Support       Future Appointments   Date Time Provider Department Center   1/15/2024  4:10 PM LAB CHAIR 1 [facility]  [facility]   1/15/2024  4:40 PM Napoleon Gutierrez MD [facility]   1/15/2024  4:45 PM CHAIR 01 [facility] Rm LG  [facility]      Follow-up Information       Napoleon Mead MD .    Specialty: Family Medicine  Contact information:  [address] [address] [address] [phone]                             Time Spent on Discharge:  Greater than 30 minutes      Cedrick Mendoza MD  [facility]  12/30/23  10:17 EST

## 2023-12-30 NOTE — SIGNIFICANT NOTE
12/29/23 1922   Post Acute Pre-Cert Documentation   Request Submitted by Facility - Type: Hospital   Post-Acute Authorization Type Submitted: SNF   Date Post Acute Pre-Cert Inititated per Facility 12/29/23   Date Post Acute Pre-Cert Completed 12/29/23   Accepting Facility [facility]   Hospital Discharge Date Requested 12/30/23   All Clinicals Submitted? Yes   Had Accepting Facility at Time of Submission Yes   Response Received from Insurance? Approval   Response Communicated to: ;Accepting Facility Liaison;Accepting Facility Auth Department   Authorization Number: [id number]   Post Acute Pre-Cert Initiated Comment LUCY JUSTIN

## 2023-12-30 NOTE — PLAN OF CARE
Problem: Fall Injury Risk  Goal: Absence of Fall and Fall-Related Injury  Outcome: Ongoing, Progressing  Intervention: Promote Injury-Free Environment  Recent Flowsheet Documentation  Taken 12/30/2023 0600 by Tala Lyon RN  Safety Promotion/Fall Prevention:   safety round/check completed   lighting adjusted   clutter free environment maintained   assistive device/personal items within reach  Taken 12/30/2023 0400 by Tala Lyon RN  Safety Promotion/Fall Prevention:   safety round/check completed   lighting adjusted   assistive device/personal items within reach  Taken 12/30/2023 0200 by Tala Lyon RN  Safety Promotion/Fall Prevention:   safety round/check completed   lighting adjusted   clutter free environment maintained   assistive device/personal items within reach  Taken 12/30/2023 0000 by Tala Lyon RN  Safety Promotion/Fall Prevention:   safety round/check completed   lighting adjusted   clutter free environment maintained   assistive device/personal items within reach  Taken 12/29/2023 2009 by Tala Lyon RN  Safety Promotion/Fall Prevention:   safety round/check completed   lighting adjusted  Taken 12/29/2023 1950 by Tala Lyon RN  Safety Promotion/Fall Prevention:   safety round/check completed   lighting adjusted     Problem: Infection  Goal: Absence of Infection Signs and Symptoms  Outcome: Ongoing, Progressing  Intervention: Prevent or Manage Infection  Recent Flowsheet Documentation  Taken 12/30/2023 0600 by Tala Lyon RN  Isolation Precautions: contact  Taken 12/30/2023 0400 by Tala Lyon RN  Isolation Precautions: contact  Taken 12/30/2023 0200 by Tala Lyon RN  Isolation Precautions: contact  Taken 12/30/2023 0000 by Tala Lyon RN  Isolation Precautions: contact  Taken 12/29/2023 2009 by Tala Lyon RN  Isolation Precautions: contact  Taken 12/29/2023 1950 by Tala Lyon RN  Isolation Precautions: contact     Problem:  Skin Injury Risk Increased  Goal: Skin Health and Integrity  Outcome: Ongoing, Progressing   Goal Outcome Evaluation:         :   Plan of care is progressing;ongoing, waiting for rehab placement     patient is alert and oriented times 4. dressing to left foot is clean dry and intact, left midline in place for home IV abx. Purewick and brief on due to occasional incontinence.iv abx given as ordered, Call light within reach, safety precautions in place. medicated with tylenol for low grade temp this morning on last round

## 2023-12-31 NOTE — PROGRESS NOTES
Continued Stay Note  [facility]     Patient Name: Halie Guidry  MRN: [id number]  Today's Date: 12/31/2023    Admit Date: 12/24/2023    Plan: Jim via son to transport.   Discharge Plan       Row Name 12/31/23 0827       Plan    Plan Comments VM received at 0800 on case management main line [phone] date/time stamp: 12/31/23 @ 0025 AM from [facility] [address] nurse's station to relay message to [facility] that patient medications had not been sent to facility pharmacy. Chart reviewed, patient discharged to [facility] last evening, pharmacy selected in [employer] at time of DC was a [facility]'s location and should be [facility] which [facility]. Pharmacy corrected in [employer], message to MD via secure chat and requested medications be resubmitted with corrected pharmacy. [facility] to [facility]. Call placed to [facility] and S/W Latia (nurse for patient's trent), states all has been corrected and submitted and they have everything they need. Updates to all parties....Aden MILLER                   Discharge Codes    No documentation.                 Expected Discharge Date and Time       Expected Discharge Date Expected Discharge Time    Dec 30, 2023               Kenya Melendez, RN

## 2024-01-01 ENCOUNTER — TELEPHONE (OUTPATIENT)
Dept: FAMILY MEDICINE CLINIC | Facility: CLINIC | Age: 84
End: 2024-01-01

## 2024-01-01 LAB — BACTERIA ISLT: NORMAL

## 2024-01-03 NOTE — CASE MANAGEMENT/SOCIAL WORK
Case Management Discharge Note      Final Note: skilled bed at [facility]. Nichole BAIG    Provided Post Acute Provider List?: N/A  N/A Provider List Comment: Patient denies need  Provided Post Acute Provider Quality & Resource List?: N/A  N/A Quality & Resource List Comment: Patient denies need    Selected Continued Care - Discharged on 12/30/2023 Admission date: 12/24/2023 - Discharge disposition: Skilled Nursing Facility (DC - External)      Destination Coordination complete.      Service Provider Selected Services Address Phone Fax Patient Preferred    [facility] [address] [address] [phone] --              Durable Medical Equipment    No services have been selected for the patient.                Dialysis/Infusion    No services have been selected for the patient.                Home Medical Care    No services have been selected for the patient.                Therapy    No services have been selected for the patient.                Community Resources    No services have been selected for the patient.                Community & DME    No services have been selected for the patient.                         Final Discharge Disposition Code: 03 - skilled nursing facility (SNF)

## 2024-01-09 ENCOUNTER — HOSPITAL ENCOUNTER (INPATIENT)
Facility: HOSPITAL | Age: 84
LOS: 5 days | Discharge: NURSING, REHAB OR LONG-TERM CARE | End: 2024-01-15
Attending: EMERGENCY MEDICINE | Admitting: HOSPITALIST
Payer: MEDICARE

## 2024-01-09 ENCOUNTER — APPOINTMENT (OUTPATIENT)
Dept: GENERAL RADIOLOGY | Facility: HOSPITAL | Age: 84
End: 2024-01-09
Payer: MEDICARE

## 2024-01-09 DIAGNOSIS — D72.829 LEUKOCYTOSIS, UNSPECIFIED TYPE: ICD-10-CM

## 2024-01-09 DIAGNOSIS — G62.9 NEUROPATHY: Chronic | ICD-10-CM

## 2024-01-09 DIAGNOSIS — A04.72 C. DIFFICILE COLITIS: ICD-10-CM

## 2024-01-09 DIAGNOSIS — N28.9 ACUTE RENAL INSUFFICIENCY: Primary | ICD-10-CM

## 2024-01-09 DIAGNOSIS — U07.1 COVID-19: ICD-10-CM

## 2024-01-09 LAB
ALBUMIN SERPL-MCNC: 3.1 G/DL (ref 3.5–5.2)
ALBUMIN/GLOB SERPL: 0.9 G/DL
ALP SERPL-CCNC: 94 U/L (ref 39–117)
ALT SERPL W P-5'-P-CCNC: 11 U/L (ref 1–33)
ANION GAP SERPL CALCULATED.3IONS-SCNC: 13.2 MMOL/L (ref 5–15)
AST SERPL-CCNC: 24 U/L (ref 1–32)
B PARAPERT DNA SPEC QL NAA+PROBE: NOT DETECTED
B PERT DNA SPEC QL NAA+PROBE: NOT DETECTED
BACTERIA UR QL AUTO: ABNORMAL /HPF
BASOPHILS # BLD AUTO: 0.2 10*3/MM3 (ref 0–0.2)
BASOPHILS NFR BLD AUTO: 1 % (ref 0–1.5)
BILIRUB SERPL-MCNC: 0.2 MG/DL (ref 0–1.2)
BILIRUB UR QL STRIP: NEGATIVE
BUN SERPL-MCNC: 57 MG/DL (ref 8–23)
BUN/CREAT SERPL: 28.1 (ref 7–25)
C PNEUM DNA NPH QL NAA+NON-PROBE: NOT DETECTED
CALCIUM SPEC-SCNC: 8.4 MG/DL (ref 8.6–10.5)
CHLORIDE SERPL-SCNC: 110 MMOL/L (ref 98–107)
CLARITY UR: CLEAR
CO2 SERPL-SCNC: 15.8 MMOL/L (ref 22–29)
COLOR UR: YELLOW
CREAT SERPL-MCNC: 2.03 MG/DL (ref 0.57–1)
D-LACTATE SERPL-SCNC: 0.9 MMOL/L (ref 0.5–2)
DEPRECATED RDW RBC AUTO: 44.9 FL (ref 37–54)
EGFRCR SERPLBLD CKD-EPI 2021: 23.9 ML/MIN/1.73
EOSINOPHIL # BLD AUTO: 0.45 10*3/MM3 (ref 0–0.4)
EOSINOPHIL NFR BLD AUTO: 2.4 % (ref 0.3–6.2)
ERYTHROCYTE [DISTWIDTH] IN BLOOD BY AUTOMATED COUNT: 15.3 % (ref 12.3–15.4)
FLUAV SUBTYP SPEC NAA+PROBE: NOT DETECTED
FLUBV RNA ISLT QL NAA+PROBE: NOT DETECTED
GLOBULIN UR ELPH-MCNC: 3.4 GM/DL
GLUCOSE BLDC GLUCOMTR-MCNC: 191 MG/DL (ref 70–130)
GLUCOSE SERPL-MCNC: 212 MG/DL (ref 65–99)
GLUCOSE UR STRIP-MCNC: NEGATIVE MG/DL
HADV DNA SPEC NAA+PROBE: NOT DETECTED
HCOV 229E RNA SPEC QL NAA+PROBE: NOT DETECTED
HCOV HKU1 RNA SPEC QL NAA+PROBE: NOT DETECTED
HCOV NL63 RNA SPEC QL NAA+PROBE: NOT DETECTED
HCOV OC43 RNA SPEC QL NAA+PROBE: NOT DETECTED
HCT VFR BLD AUTO: 33.9 % (ref 34–46.6)
HGB BLD-MCNC: 11.5 G/DL (ref 12–15.9)
HGB UR QL STRIP.AUTO: ABNORMAL
HMPV RNA NPH QL NAA+NON-PROBE: NOT DETECTED
HPIV1 RNA ISLT QL NAA+PROBE: NOT DETECTED
HPIV2 RNA SPEC QL NAA+PROBE: NOT DETECTED
HPIV3 RNA NPH QL NAA+PROBE: NOT DETECTED
HPIV4 P GENE NPH QL NAA+PROBE: NOT DETECTED
HYALINE CASTS UR QL AUTO: ABNORMAL /LPF
IMM GRANULOCYTES # BLD AUTO: 0.25 10*3/MM3 (ref 0–0.05)
IMM GRANULOCYTES NFR BLD AUTO: 1.3 % (ref 0–0.5)
KETONES UR QL STRIP: NEGATIVE
LEUKOCYTE ESTERASE UR QL STRIP.AUTO: NEGATIVE
LYMPHOCYTES # BLD AUTO: 1.24 10*3/MM3 (ref 0.7–3.1)
LYMPHOCYTES NFR BLD AUTO: 6.5 % (ref 19.6–45.3)
M PNEUMO IGG SER IA-ACNC: NOT DETECTED
MCH RBC QN AUTO: 27.9 PG (ref 26.6–33)
MCHC RBC AUTO-ENTMCNC: 33.9 G/DL (ref 31.5–35.7)
MCV RBC AUTO: 82.3 FL (ref 79–97)
MONOCYTES # BLD AUTO: 1.21 10*3/MM3 (ref 0.1–0.9)
MONOCYTES NFR BLD AUTO: 6.3 % (ref 5–12)
NEUTROPHILS NFR BLD AUTO: 15.79 10*3/MM3 (ref 1.7–7)
NEUTROPHILS NFR BLD AUTO: 82.5 % (ref 42.7–76)
NITRITE UR QL STRIP: NEGATIVE
NRBC BLD AUTO-RTO: 0.1 /100 WBC (ref 0–0.2)
PH UR STRIP.AUTO: 5.5 [PH] (ref 5–8)
PLATELET # BLD AUTO: 351 10*3/MM3 (ref 140–450)
PMV BLD AUTO: 9.6 FL (ref 6–12)
POTASSIUM SERPL-SCNC: 4 MMOL/L (ref 3.5–5.2)
PROCALCITONIN SERPL-MCNC: 17.4 NG/ML (ref 0–0.25)
PROT SERPL-MCNC: 6.5 G/DL (ref 6–8.5)
PROT UR QL STRIP: ABNORMAL
RBC # BLD AUTO: 4.12 10*6/MM3 (ref 3.77–5.28)
RBC # UR STRIP: ABNORMAL /HPF
REF LAB TEST METHOD: ABNORMAL
RHINOVIRUS RNA SPEC NAA+PROBE: NOT DETECTED
RSV RNA NPH QL NAA+NON-PROBE: NOT DETECTED
SARS-COV-2 RNA NPH QL NAA+NON-PROBE: DETECTED
SODIUM SERPL-SCNC: 139 MMOL/L (ref 136–145)
SP GR UR STRIP: 1.02 (ref 1–1.03)
SQUAMOUS #/AREA URNS HPF: ABNORMAL /HPF
UROBILINOGEN UR QL STRIP: ABNORMAL
WBC # UR STRIP: ABNORMAL /HPF
WBC NRBC COR # BLD AUTO: 19.14 10*3/MM3 (ref 3.4–10.8)

## 2024-01-09 PROCEDURE — G0378 HOSPITAL OBSERVATION PER HR: HCPCS

## 2024-01-09 PROCEDURE — 82948 REAGENT STRIP/BLOOD GLUCOSE: CPT

## 2024-01-09 PROCEDURE — 0202U NFCT DS 22 TRGT SARS-COV-2: CPT | Performed by: PHYSICIAN ASSISTANT

## 2024-01-09 PROCEDURE — 99285 EMERGENCY DEPT VISIT HI MDM: CPT

## 2024-01-09 PROCEDURE — 25810000003 LACTATED RINGERS PER 1000 ML: Performed by: NURSE PRACTITIONER

## 2024-01-09 PROCEDURE — 80053 COMPREHEN METABOLIC PANEL: CPT | Performed by: PHYSICIAN ASSISTANT

## 2024-01-09 PROCEDURE — 85025 COMPLETE CBC W/AUTO DIFF WBC: CPT | Performed by: PHYSICIAN ASSISTANT

## 2024-01-09 PROCEDURE — 83605 ASSAY OF LACTIC ACID: CPT | Performed by: PHYSICIAN ASSISTANT

## 2024-01-09 PROCEDURE — 81001 URINALYSIS AUTO W/SCOPE: CPT | Performed by: PHYSICIAN ASSISTANT

## 2024-01-09 PROCEDURE — 84145 PROCALCITONIN (PCT): CPT | Performed by: PHYSICIAN ASSISTANT

## 2024-01-09 PROCEDURE — 63710000001 INSULIN LISPRO (HUMAN) PER 5 UNITS: Performed by: NURSE PRACTITIONER

## 2024-01-09 PROCEDURE — 71045 X-RAY EXAM CHEST 1 VIEW: CPT

## 2024-01-09 PROCEDURE — P9612 CATHETERIZE FOR URINE SPEC: HCPCS

## 2024-01-09 PROCEDURE — 25810000003 SODIUM CHLORIDE 0.9 % SOLUTION: Performed by: PHYSICIAN ASSISTANT

## 2024-01-09 PROCEDURE — 87040 BLOOD CULTURE FOR BACTERIA: CPT | Performed by: PHYSICIAN ASSISTANT

## 2024-01-09 PROCEDURE — 36415 COLL VENOUS BLD VENIPUNCTURE: CPT

## 2024-01-09 RX ORDER — SODIUM CHLORIDE 9 MG/ML
100 INJECTION, SOLUTION INTRAVENOUS CONTINUOUS
Status: DISCONTINUED | OUTPATIENT
Start: 2024-01-09 | End: 2024-01-09

## 2024-01-09 RX ORDER — INSULIN LISPRO 100 [IU]/ML
2-9 INJECTION, SOLUTION INTRAVENOUS; SUBCUTANEOUS
Status: DISCONTINUED | OUTPATIENT
Start: 2024-01-09 | End: 2024-01-10

## 2024-01-09 RX ORDER — HYDROCODONE BITARTRATE AND ACETAMINOPHEN 10; 325 MG/1; MG/1
1 TABLET ORAL 3 TIMES DAILY
Status: ON HOLD | COMMUNITY
End: 2024-01-15 | Stop reason: SDUPTHER

## 2024-01-09 RX ORDER — VANCOMYCIN HYDROCHLORIDE 125 MG/1
125 CAPSULE ORAL 4 TIMES DAILY
COMMUNITY
Start: 2024-01-06 | End: 2024-01-15 | Stop reason: HOSPADM

## 2024-01-09 RX ORDER — DEXTROSE MONOHYDRATE 25 G/50ML
25 INJECTION, SOLUTION INTRAVENOUS
Status: DISCONTINUED | OUTPATIENT
Start: 2024-01-09 | End: 2024-01-10

## 2024-01-09 RX ORDER — MULTIPLE VITAMINS W/ MINERALS TAB 9MG-400MCG
1 TAB ORAL DAILY
Status: ON HOLD | COMMUNITY

## 2024-01-09 RX ORDER — SODIUM CHLORIDE 0.9 % (FLUSH) 0.9 %
10 SYRINGE (ML) INJECTION AS NEEDED
Status: DISCONTINUED | OUTPATIENT
Start: 2024-01-09 | End: 2024-01-15 | Stop reason: HOSPADM

## 2024-01-09 RX ORDER — LEVOTHYROXINE SODIUM 112 UG/1
1.5 TABLET ORAL WEEKLY
Status: ON HOLD | COMMUNITY

## 2024-01-09 RX ORDER — ONDANSETRON 4 MG/1
4 TABLET, ORALLY DISINTEGRATING ORAL EVERY 6 HOURS PRN
Status: DISCONTINUED | OUTPATIENT
Start: 2024-01-09 | End: 2024-01-15 | Stop reason: HOSPADM

## 2024-01-09 RX ORDER — IBUPROFEN 600 MG/1
1 TABLET ORAL
Status: DISCONTINUED | OUTPATIENT
Start: 2024-01-09 | End: 2024-01-10

## 2024-01-09 RX ORDER — ALUMINA, MAGNESIA, AND SIMETHICONE 2400; 2400; 240 MG/30ML; MG/30ML; MG/30ML
15 SUSPENSION ORAL EVERY 6 HOURS PRN
Status: DISCONTINUED | OUTPATIENT
Start: 2024-01-09 | End: 2024-01-15 | Stop reason: HOSPADM

## 2024-01-09 RX ORDER — NITROGLYCERIN 0.4 MG/1
0.4 TABLET SUBLINGUAL
Status: DISCONTINUED | OUTPATIENT
Start: 2024-01-09 | End: 2024-01-15 | Stop reason: HOSPADM

## 2024-01-09 RX ORDER — SODIUM CHLORIDE, SODIUM LACTATE, POTASSIUM CHLORIDE, CALCIUM CHLORIDE 600; 310; 30; 20 MG/100ML; MG/100ML; MG/100ML; MG/100ML
50 INJECTION, SOLUTION INTRAVENOUS CONTINUOUS
Status: DISCONTINUED | OUTPATIENT
Start: 2024-01-09 | End: 2024-01-12

## 2024-01-09 RX ORDER — ONDANSETRON 2 MG/ML
4 INJECTION INTRAMUSCULAR; INTRAVENOUS EVERY 6 HOURS PRN
Status: DISCONTINUED | OUTPATIENT
Start: 2024-01-09 | End: 2024-01-15 | Stop reason: HOSPADM

## 2024-01-09 RX ORDER — NICOTINE POLACRILEX 4 MG
15 LOZENGE BUCCAL
Status: DISCONTINUED | OUTPATIENT
Start: 2024-01-09 | End: 2024-01-10

## 2024-01-09 RX ADMIN — INSULIN LISPRO 2 UNITS: 100 INJECTION, SOLUTION INTRAVENOUS; SUBCUTANEOUS at 22:02

## 2024-01-09 RX ADMIN — SODIUM CHLORIDE, POTASSIUM CHLORIDE, SODIUM LACTATE AND CALCIUM CHLORIDE 100 ML/HR: 600; 310; 30; 20 INJECTION, SOLUTION INTRAVENOUS at 20:54

## 2024-01-09 RX ADMIN — SODIUM CHLORIDE 500 ML: 9 INJECTION, SOLUTION INTRAVENOUS at 18:11

## 2024-01-09 NOTE — ED PROVIDER NOTES
MD ATTESTATION NOTE    The MADINA and I have discussed this patient's history, physical exam, and treatment plan.  I have reviewed the documentation and personally had a face to face interaction with the patient. I affirm the documentation and agree with the treatment and plan.  The attached note describes my personal findings.      I provided a substantive portion of the care of the patient.  I personally performed the physical exam in its entirety, and below are my findings.      Brief HPI: Patient was sent to the ED from [facility] with reports of an elevated white blood cell count.  Patient was admitted here at the end of December for diabetic foot ulcer and C. difficile colitis.  White blood cell count was 10.2 at time of discharge.  Patient complains of general malaise and continued diarrhea.  Denies chest pain, shortness of breath, abdominal pain, vomiting, or dysuria.    PHYSICAL EXAM  ED Triage Vitals [01/09/24 1709]   Temp Heart Rate Resp BP SpO2   98.8 °F (37.1 °C) 65 18 138/67 94 %      Temp src Heart Rate Source Patient Position BP Location FiO2 (%)   Tympanic -- -- -- --         GENERAL: Awake and alert.  Chronically ill but nontoxic-appearing elderly female.  No acute distress  HENT: nares patent  EYES: no scleral icterus  CV: regular rhythm, normal rate  RESPIRATORY: normal effort, clear to auscultation bilaterally  ABDOMEN: soft, nontender  MUSCULOSKELETAL: no deformity  NEURO: alert, moves all extremities, follows commands  PSYCH:  calm, cooperative  SKIN: warm, dry, there is a bandage and Ace wrap on the left foot    Vital signs and nursing notes reviewed.        Plan: Obtain labs and chest x-ray.  Patient will be given IV fluids    Chest x-ray personally interpreted by me.  My personal interpretation is: Heart size is normal.  Lungs are clear.  No pleural effusion    MDM: White blood cell count and creatinine are elevated.  Patient is getting IV fluids.  Will hold off on additional  antibiotics at this time.  Patient will be admitted and will likely need ID consult.    ED Course as of 01/10/24 2322   Tue Jan 09, 2024 1819 WBC(!): 19.14 [DC]   1830 XR Chest 1 View  Radiology study independently interpreted by me and my findings are no pneumothorax.   [DC]   1837 BUN(!): 57 [WH]   1837 Creatinine(!): 2.03  BUN was 26 and creatinine was 1.17 on 12/30/2023 [WH]   1837 CO2(!): 15.8 [WH]   1837 eGFR(!): 23.9 [WH]   1841 Procalcitonin(!): 17.40 [DC]   1841 Lactate: 0.9 [DC]   1841 Creatinine(!): 2.03 [DC]   1841 CO2(!): 15.8 [DC]   1842 BUN(!): 57 [DC]   1857 COVID19(!!): Detected [DC]   1940 Patient has known C. difficile and is currently on oral vancomycin.  She just recently completed course of IV cefepime for her diabetic foot ulcer.  Suspect leukocytosis is related to C. difficile.  She is not having any significant abdominal tenderness.  Clinically dehydrated and is not eating or drinking much with acute kidney injury.  Given IV fluids.  Lactic acid is within normal limits although she does have an elevated procalcitonin.  Will defer any further IV antibiotics to inpatient team given her C. difficile. [DC]   1943 Comprehensive Metabolic Panel(!) [DC]   1948 Discussed case with KELSEA Ornelas, who will accept patient for admission under Dr. Ruiz. [DC]      ED Course User Index  [DC] Dannielle Moore PA  [WH] Everton Perez MD Holland, William D, MD  01/09/24 1954       Everton Perez MD  01/10/24 2322

## 2024-01-09 NOTE — ED TRIAGE NOTES
Patient to ED per EMS from [facility] w/ reports of abnormally high WBCs from labs drawn yesterday. Patient currently w/ C. Diff and COVID w/in the last week.

## 2024-01-09 NOTE — ED PROVIDER NOTES
" EMERGENCY DEPARTMENT ENCOUNTER      PCP: Napoleon Mead MD  Patient Care Team:  Napoleon Mead MD as PCP - General  Napoleon Mead MD as PCP - Family Medicine  Lilia Orona APRN as Nurse Practitioner (Nurse Practitioner)  Beena Laguerre APRN as Referring Physician (Gastroenterology)  Napoleon Gutierrez MD as Consulting Physician (Hematology and Oncology)  Stefan Gutierrez MD as Consulting Physician (Pain Medicine)   Independent Historians: Patient, EMS    HPI:  Chief Complaint: Abnormal lab  A complete HPI/ROS/PMH/PSH/SH/FH are unobtainable due to: None    Chronic or social conditions impacting patient care (social determinants of health): None    Context: Halie Guidry is a 83 y.o. female who presents to the ED from [facility] via EMS c/o abnormal labs.  Staff reports patient has elevated white blood cell count.  She tested positive for COVID and C. difficile within the past week.  She was recently discharged from the hospital on IV antibiotics for a diabetic foot ulcer.  She states she generally does not feel well but has no specific pain.  She denies abdominal pain.  She reports diarrhea started a few days ago.  Patient also notes \"I just want to die\" though she has no specific plan and cannot state why she feels this way.    Review of prior external notes and/or external test results outside of this encounter: Reviewed discharge summary from 12/30/2023.  Patient acute on chronic left foot diabetic foot ulcer.  Patient had I&D of the wound, wound cultures grew multidrug-resistant Enterobacter.  ID consulted and plan for IV cefepime 1 g twice daily for 10 days.  Patient has history of C. difficile so probiotic was started.  Discharged to skilled nursing facility.      PAST MEDICAL HISTORY  Active Ambulatory Problems     Diagnosis Date Noted    Compression fracture 04/22/2009    Lumbar degenerative disc disease 07/05/2012    Type 2 diabetes mellitus with hyperglycemia, with long-term " current use of insulin     Hyperlipidemia     Benign essential hypertension 08/20/2014    Primary hypothyroidism     Neuropathy     Osteoporosis 09/09/2015    Proteinuria 02/28/2012    Tobacco abuse 08/22/2013    Diabetic eye exam 02/12/2014    Generalized weakness 05/27/2017    Spinal stenosis 05/27/2017    Scoliosis 05/27/2017    Arthritis 05/27/2017    VBI (vertebrobasilar insufficiency) 05/28/2017    Orthostatic hypotension 05/28/2017    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 05/28/2017    Severe hypothyroidism 05/30/2017    Noncompliance with medication regimen 05/30/2017    Vitamin D deficiency disease 06/01/2017    Altered mental status 12/15/2017    Tremor 01/09/2018    Seizure 05/21/2019    Stage 3a chronic kidney disease 03/09/2012    Thoracic degenerative disc disease 01/27/2020    Metabolic encephalopathy 12/02/2021    VIPUL (acute kidney injury) 12/02/2021    Hyperglycemia 12/02/2021    Type II diabetes mellitus, uncontrolled 12/02/2021    Lower abdominal pain 02/23/2022    Transaminitis 02/23/2022    COVID-19 virus detected 02/23/2022    UTI (urinary tract infection) 02/23/2022    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    Choledocholithiasis 02/23/2022    Hypokalemia 02/24/2022    Hypoxia 02/24/2022    Generalized abdominal pain 03/26/2022    History of Clostridium difficile infection 03/26/2022    History of ERCP 03/26/2022    Acute UTI (urinary tract infection) 03/27/2022    Hypomagnesemia 03/28/2022    Burning pain 05/27/2022    Anxiety disorder 05/27/2022    Neuropathic pain 05/27/2022    Intertrigo 05/27/2022    Weakness of both lower extremities 06/24/2022    Accelerated hypertension 09/01/2022    Acute cystitis without hematuria 09/06/2022    Altered mental status, unspecified altered mental status type 11/04/2022    Left lower lobe pneumonia 11/04/2022    Acute pain of left foot 05/13/2023    Cellulitis and abscess of left lower extremity 05/15/2023    Hypertensive kidney disease with  stage 3a chronic kidney disease 06/02/2023    Bilateral leg pain 07/21/2023    Peripheral edema 07/30/2023    Primary malignant neuroendocrine tumor of pancreas 08/24/2023    Encounter for long-term (current) use of high-risk medication 08/28/2023    Diabetic foot ulcer 12/24/2023    Acute renal insufficiency 01/09/2024     Resolved Ambulatory Problems     Diagnosis Date Noted    Leukocytosis     Acute metabolic encephalopathy 06/22/2022    Hypoglycemia 06/23/2022    Acute metabolic encephalopathy 06/24/2022    Diarrhea 11/04/2022     Past Medical History:   Diagnosis Date    Anxiety     Bleeding disorder     Depression     Diabetes     Diabetes mellitus, type 2     Disc degeneration, lumbar     Headache, tension-type     Hypothyroidism     Peripheral neuropathy     Rotator cuff tear, left     Shoulder pain        The patient has started, but not completed, their COVID-19 vaccination series.    PAST SURGICAL HISTORY  Past Surgical History:   Procedure Laterality Date    APPENDECTOMY      BILATERAL BREAST REDUCTION Bilateral 08/2015    CATARACT EXTRACTION  03/2015    CHOLECYSTECTOMY WITH INTRAOPERATIVE CHOLANGIOGRAM N/A 3/27/2022    Procedure: CHOLECYSTECTOMY LAPAROSCOPIC INTRAOPERATIVE CHOLANGIOGRAM;  Surgeon: Aiyana Hill MD;  Location: [facility];  Service: General;  Laterality: N/A;    COLONOSCOPY  06/05/2015    WNL    ERCP N/A 2/25/2022    Procedure: ENDOSCOPIC RETROGRADE CHOLANGIOPANCREATOGRAPHY with sphincterotomy and balloon sweep;  Surgeon: Chilo Wilhelm MD;  Location: [facility] ENDOSCOPY;  Service: Gastroenterology;  Laterality: N/A;  PRE/POST - CBD stones    ERCP N/A 3/28/2022    Procedure: ENDOSCOPIC RETROGRADE CHOLANGIOPANCREATOGRAPHY WITH SPHINCTEROTOMY AND BALLOON SWEEP;  Surgeon: Chilo Wilhelm MD;  Location: [facility] ENDOSCOPY;  Service: Gastroenterology;  Laterality: N/A;  PRE: COMMON DUCT STONE  POST: COMMON DUCT STONE    KYPHOPLASTY      REDUCTION MAMMAPLASTY      TONSILLECTOMY            FAMILY HISTORY  Family History   Problem Relation Age of Onset    Bone cancer Mother     No Known Problems Father     Heart disease Daughter          SOCIAL HISTORY  Social History     Socioeconomic History    Marital status:     Number of children: 3   Tobacco Use    Smoking status: Former     Packs/day: 1.00     Years: 40.00     Additional pack years: 0.00     Total pack years: 40.00     Types: Cigarettes     Quit date:      Years since quittin.0    Smokeless tobacco: Never   Vaping Use    Vaping Use: Never used   Substance and Sexual Activity    Alcohol use: No    Drug use: No    Sexual activity: Defer         ALLERGIES  Sulfa antibiotics        REVIEW OF SYSTEMS  Review of Systems   Constitutional:  Positive for fatigue.   Respiratory:  Negative for shortness of breath.    Gastrointestinal:  Positive for diarrhea. Negative for abdominal pain.   Neurological:  Positive for weakness.        All systems reviewed and negative except for those discussed in HPI.       PHYSICAL EXAM    I have reviewed the triage vital signs and nursing notes.    ED Triage Vitals [24 1709]   Temp Heart Rate Resp BP SpO2   98.8 °F (37.1 °C) 65 18 138/67 94 %      Temp src Heart Rate Source Patient Position BP Location FiO2 (%)   Tympanic -- -- -- --       Physical Exam  GENERAL: alert, chronically ill appearing  SKIN: Warm, dry  HENT: Normocephalic, atraumatic, dry mucous membranes  EYES: no scleral icterus  CV: regular rhythm, regular rate  RESPIRATORY: normal effort, lungs clear  ABDOMEN: soft, nontender to palpation, nondistended  MUSCULOSKELETAL: no deformity, Ace bandage to left foot  NEURO: alert, moves all extremities, follows commands          LAB RESULTS  Recent Results (from the past 24 hour(s))   Comprehensive Metabolic Panel    Collection Time: 24  5:58 PM    Specimen: Blood   Result Value Ref Range    Glucose 212 (H) 65 - 99 mg/dL    BUN 57 (H) 8 - 23 mg/dL    Creatinine 2.03 (H) 0.57 -  1.00 mg/dL    Sodium 139 136 - 145 mmol/L    Potassium 4.0 3.5 - 5.2 mmol/L    Chloride 110 (H) 98 - 107 mmol/L    CO2 15.8 (L) 22.0 - 29.0 mmol/L    Calcium 8.4 (L) 8.6 - 10.5 mg/dL    Total Protein 6.5 6.0 - 8.5 g/dL    Albumin 3.1 (L) 3.5 - 5.2 g/dL    ALT (SGPT) 11 1 - 33 U/L    AST (SGOT) 24 1 - 32 U/L    Alkaline Phosphatase 94 39 - 117 U/L    Total Bilirubin 0.2 0.0 - 1.2 mg/dL    Globulin 3.4 gm/dL    A/G Ratio 0.9 g/dL    BUN/Creatinine Ratio 28.1 (H) 7.0 - 25.0    Anion Gap 13.2 5.0 - 15.0 mmol/L    eGFR 23.9 (L) >60.0 mL/min/1.73   Lactic Acid, Plasma    Collection Time: 01/09/24  5:58 PM    Specimen: Blood   Result Value Ref Range    Lactate 0.9 0.5 - 2.0 mmol/L   Procalcitonin    Collection Time: 01/09/24  5:58 PM    Specimen: Blood   Result Value Ref Range    Procalcitonin 17.40 (H) 0.00 - 0.25 ng/mL   CBC Auto Differential    Collection Time: 01/09/24  5:58 PM    Specimen: Blood   Result Value Ref Range    WBC 19.14 (H) 3.40 - 10.80 10*3/mm3    RBC 4.12 3.77 - 5.28 10*6/mm3    Hemoglobin 11.5 (L) 12.0 - 15.9 g/dL    Hematocrit 33.9 (L) 34.0 - 46.6 %    MCV 82.3 79.0 - 97.0 fL    MCH 27.9 26.6 - 33.0 pg    MCHC 33.9 31.5 - 35.7 g/dL    RDW 15.3 12.3 - 15.4 %    RDW-SD 44.9 37.0 - 54.0 fl    MPV 9.6 6.0 - 12.0 fL    Platelets 351 140 - 450 10*3/mm3    Neutrophil % 82.5 (H) 42.7 - 76.0 %    Lymphocyte % 6.5 (L) 19.6 - 45.3 %    Monocyte % 6.3 5.0 - 12.0 %    Eosinophil % 2.4 0.3 - 6.2 %    Basophil % 1.0 0.0 - 1.5 %    Immature Grans % 1.3 (H) 0.0 - 0.5 %    Neutrophils, Absolute 15.79 (H) 1.70 - 7.00 10*3/mm3    Lymphocytes, Absolute 1.24 0.70 - 3.10 10*3/mm3    Monocytes, Absolute 1.21 (H) 0.10 - 0.90 10*3/mm3    Eosinophils, Absolute 0.45 (H) 0.00 - 0.40 10*3/mm3    Basophils, Absolute 0.20 0.00 - 0.20 10*3/mm3    Immature Grans, Absolute 0.25 (H) 0.00 - 0.05 10*3/mm3    nRBC 0.1 0.0 - 0.2 /100 WBC   Respiratory Panel PCR w/COVID-19(SARS-CoV-2) NAY/JEANIE/GAYLE/PAD/COR/YANN In-House, NP Swab in [facility], 2  HR TAT - Swab, Nasopharynx    Collection Time: 01/09/24  5:59 PM    Specimen: Nasopharynx; Swab   Result Value Ref Range    ADENOVIRUS, PCR Not Detected Not Detected    Coronavirus 229E Not Detected Not Detected    Coronavirus HKU1 Not Detected Not Detected    Coronavirus NL63 Not Detected Not Detected    Coronavirus OC43 Not Detected Not Detected    COVID19 Detected (C) Not Detected - Ref. Range    Human Metapneumovirus Not Detected Not Detected    Human Rhinovirus/Enterovirus Not Detected Not Detected    Influenza A PCR Not Detected Not Detected    Influenza B PCR Not Detected Not Detected    Parainfluenza Virus 1 Not Detected Not Detected    Parainfluenza Virus 2 Not Detected Not Detected    Parainfluenza Virus 3 Not Detected Not Detected    Parainfluenza Virus 4 Not Detected Not Detected    RSV, PCR Not Detected Not Detected    Bordetella pertussis pcr Not Detected Not Detected    Bordetella parapertussis PCR Not Detected Not Detected    Chlamydophila pneumoniae PCR Not Detected Not Detected    Mycoplasma pneumo by PCR Not Detected Not Detected   Urinalysis With Microscopic If Indicated (No Culture) - Straight Cath    Collection Time: 01/09/24  6:11 PM    Specimen: Straight Cath; Urine   Result Value Ref Range    Color, UA Yellow Yellow, Straw    Appearance, UA Clear Clear    pH, UA 5.5 5.0 - 8.0    Specific Gravity, UA 1.016 1.005 - 1.030    Glucose, UA Negative Negative    Ketones, UA Negative Negative    Bilirubin, UA Negative Negative    Blood, UA Moderate (2+) (A) Negative    Protein,  mg/dL (2+) (A) Negative    Leuk Esterase, UA Negative Negative    Nitrite, UA Negative Negative    Urobilinogen, UA 0.2 E.U./dL 0.2 - 1.0 E.U./dL   Urinalysis, Microscopic Only - Straight Cath    Collection Time: 01/09/24  6:11 PM    Specimen: Straight Cath; Urine   Result Value Ref Range    RBC, UA 3-5 (A) None Seen, 0-2 /HPF    WBC, UA 3-5 (A) None Seen, 0-2 /HPF    Bacteria, UA None Seen None Seen /HPF    Squamous  Epithelial Cells, UA 0-2 None Seen, 0-2 /HPF    Hyaline Casts, UA 0-2 None Seen /LPF    Methodology Automated Microscopy    POC Glucose Once    Collection Time: 01/09/24  9:09 PM    Specimen: Blood   Result Value Ref Range    Glucose 191 (H) 70 - 130 mg/dL       Ordered the above labs and independently reviewed and interpreted the results.        RADIOLOGY  XR Chest 1 View    Result Date: 1/9/2024  XR CHEST 1 VW-1/9/2024  HISTORY: Leukocytosis.  Heart size is within normal limits. Lungs appear free of acute infiltrates. There is some aortic calcification. There are degenerative changes of the shoulders left greater than right.      1. No acute process.   This report was finalized on 1/9/2024 6:26 PM by Dr. Mario Alberto Moura M.D on Workstation: [id number]       I ordered the above noted radiological studies. Independently reviewed and interpreted by me.  See dictation for official radiology interpretation.      PROCEDURES    Procedures      MEDICATIONS GIVEN IN ER    Medications   sodium chloride 0.9 % flush 10 mL (has no administration in time range)   nitroglycerin (NITROSTAT) SL tablet 0.4 mg (has no administration in time range)   ondansetron ODT (ZOFRAN-ODT) disintegrating tablet 4 mg (has no administration in time range)     Or   ondansetron (ZOFRAN) injection 4 mg (has no administration in time range)   aluminum-magnesium hydroxide-simethicone (MAALOX MAX) 400-400-40 MG/5ML suspension 15 mL (has no administration in time range)   dextrose (GLUTOSE) oral gel 15 g (has no administration in time range)   dextrose (D50W) (25 g/50 mL) IV injection 25 g (has no administration in time range)   glucagon (GLUCAGEN) injection 1 mg (has no administration in time range)   insulin lispro (HUMALOG/ADMELOG) injection 2-9 Units (2 Units Subcutaneous Given 1/9/24 2202)   lactated ringers infusion (100 mL/hr Intravenous New Bag 1/9/24 2054)   Pharmacy Consult - Remdesivir for Severe COVID-19 (Within 7 days of symptom onset) (has  no administration in time range)   remdesivir 200 mg in sodium chloride 0.9 % 290 mL IVPB (powder vial) (has no administration in time range)     Followed by   remdesivir 100 mg in sodium chloride 0.9 % 250 mL IVPB (powder vial) (has no administration in time range)   sodium chloride 0.9 % bolus 500 mL (0 mL Intravenous Stopped 1/9/24 1841)         PROGRESS, DATA ANALYSIS, CONSULTS, AND MEDICAL DECISION MAKING    All labs have been independently reviewed and interpreted by me.  All radiology studies have been independently reviewed and interpreted by me and discussed with radiologist dictating the report.   EKG's independently reviewed and interpreted by me.  Discussion below represents my analysis of pertinent findings related to patient's condition, differential diagnosis, treatment plan and final disposition.    Differential diagnosis: c. Diff colitis, toxic megacolon, UTI, cellulitis, osteomyelitis, bacteremia, VIPUL    ED Course as of 01/09/24 2252   Tue Jan 09, 2024 1819 WBC(!): 19.14 [DC]   1830 XR Chest 1 View  Radiology study independently interpreted by me and my findings are no pneumothorax.   [DC]   1837 BUN(!): 57 [WH]   1837 Creatinine(!): 2.03  BUN was 26 and creatinine was 1.17 on 12/30/2023 [WH]   1837 CO2(!): 15.8 [WH]   1837 eGFR(!): 23.9 [WH]   1841 Procalcitonin(!): 17.40 [DC]   1841 Lactate: 0.9 [DC]   1841 Creatinine(!): 2.03 [DC]   1841 CO2(!): 15.8 [DC]   1842 BUN(!): 57 [DC]   1857 COVID19(!!): Detected [DC]   1940 Patient has known C. difficile and is currently on oral vancomycin.  She just recently completed course of IV cefepime for her diabetic foot ulcer.  Suspect leukocytosis is related to C. difficile.  She is not having any significant abdominal tenderness.  Clinically dehydrated and is not eating or drinking much with acute kidney injury.  Given IV fluids.  Lactic acid is within normal limits although she does have an elevated procalcitonin.  Will defer any further IV antibiotics  to inpatient team given her C. difficile. [DC]   1943 Comprehensive Metabolic Panel(!) [DC]   1948 Discussed case with KELSEA Ornelas, who will accept patient for admission under Dr. Ballesteros. [DC]      ED Course User Index  [DC] Dannielle Moore PA  [WH] Everton Perez MD             AS OF 22:52 EST VITALS:    BP - 138/67  HR - 79  TEMP - 98.8 °F (37.1 °C) (Tympanic)  O2 SATS - 95%        DIAGNOSIS  Final diagnoses:   Acute renal insufficiency   Leukocytosis, unspecified type   C. difficile colitis   COVID-19         DISPOSITION  ED Disposition       ED Disposition   Decision to Admit    Condition   --    Comment   Level of Care: Telemetry [5]   Diagnosis: Acute renal insufficiency [444030]   Admitting Physician: CHARLY BALLESTEROS [[id number]]   Attending Physician: CHARLY BALLESTEROS [[id number]]                    Note Disclaimer: At [facility], we believe that sharing information builds trust and better relationships. You are receiving this note because you recently visited [facility]. It is possible you will see health information before a provider has talked with you about it. This kind of information can be easy to misunderstand. To help you fully understand what it means for your health, we urge you to discuss this note with your provider.         Dannielle Moore PA  01/09/24 4217

## 2024-01-10 ENCOUNTER — APPOINTMENT (OUTPATIENT)
Dept: CT IMAGING | Facility: HOSPITAL | Age: 84
End: 2024-01-10
Payer: MEDICARE

## 2024-01-10 PROBLEM — U07.1 COVID-19: Status: ACTIVE | Noted: 2024-01-10

## 2024-01-10 LAB
ALBUMIN SERPL-MCNC: 2.9 G/DL (ref 3.5–5.2)
ALBUMIN/GLOB SERPL: 0.9 G/DL
ALP SERPL-CCNC: 95 U/L (ref 39–117)
ALT SERPL W P-5'-P-CCNC: 11 U/L (ref 1–33)
ANION GAP SERPL CALCULATED.3IONS-SCNC: 15.7 MMOL/L (ref 5–15)
AST SERPL-CCNC: 22 U/L (ref 1–32)
BASOPHILS # BLD AUTO: 0.13 10*3/MM3 (ref 0–0.2)
BASOPHILS NFR BLD AUTO: 0.8 % (ref 0–1.5)
BILIRUB SERPL-MCNC: 0.2 MG/DL (ref 0–1.2)
BUN SERPL-MCNC: 41 MG/DL (ref 8–23)
BUN/CREAT SERPL: 28.3 (ref 7–25)
CALCIUM SPEC-SCNC: 8.4 MG/DL (ref 8.6–10.5)
CHLORIDE SERPL-SCNC: 112 MMOL/L (ref 98–107)
CO2 SERPL-SCNC: 14.3 MMOL/L (ref 22–29)
CREAT SERPL-MCNC: 1.45 MG/DL (ref 0.57–1)
DEPRECATED RDW RBC AUTO: 43.6 FL (ref 37–54)
EGFRCR SERPLBLD CKD-EPI 2021: 35.9 ML/MIN/1.73
EOSINOPHIL # BLD AUTO: 0.4 10*3/MM3 (ref 0–0.4)
EOSINOPHIL NFR BLD AUTO: 2.5 % (ref 0.3–6.2)
ERYTHROCYTE [DISTWIDTH] IN BLOOD BY AUTOMATED COUNT: 15 % (ref 12.3–15.4)
GLOBULIN UR ELPH-MCNC: 3.2 GM/DL
GLUCOSE BLDC GLUCOMTR-MCNC: 111 MG/DL (ref 70–130)
GLUCOSE BLDC GLUCOMTR-MCNC: 145 MG/DL (ref 70–130)
GLUCOSE BLDC GLUCOMTR-MCNC: 184 MG/DL (ref 70–130)
GLUCOSE BLDC GLUCOMTR-MCNC: 211 MG/DL (ref 70–130)
GLUCOSE BLDC GLUCOMTR-MCNC: 230 MG/DL (ref 70–130)
GLUCOSE SERPL-MCNC: 146 MG/DL (ref 65–99)
HCT VFR BLD AUTO: 33 % (ref 34–46.6)
HGB BLD-MCNC: 10.5 G/DL (ref 12–15.9)
IMM GRANULOCYTES # BLD AUTO: 0.25 10*3/MM3 (ref 0–0.05)
IMM GRANULOCYTES NFR BLD AUTO: 1.6 % (ref 0–0.5)
LYMPHOCYTES # BLD AUTO: 1.25 10*3/MM3 (ref 0.7–3.1)
LYMPHOCYTES NFR BLD AUTO: 7.8 % (ref 19.6–45.3)
MAGNESIUM SERPL-MCNC: 2 MG/DL (ref 1.6–2.4)
MCH RBC QN AUTO: 25.7 PG (ref 26.6–33)
MCHC RBC AUTO-ENTMCNC: 31.8 G/DL (ref 31.5–35.7)
MCV RBC AUTO: 80.9 FL (ref 79–97)
MONOCYTES # BLD AUTO: 0.98 10*3/MM3 (ref 0.1–0.9)
MONOCYTES NFR BLD AUTO: 6.1 % (ref 5–12)
NEUTROPHILS NFR BLD AUTO: 13.05 10*3/MM3 (ref 1.7–7)
NEUTROPHILS NFR BLD AUTO: 81.2 % (ref 42.7–76)
NRBC BLD AUTO-RTO: 0.1 /100 WBC (ref 0–0.2)
PLATELET # BLD AUTO: 350 10*3/MM3 (ref 140–450)
PMV BLD AUTO: 9.4 FL (ref 6–12)
POTASSIUM SERPL-SCNC: 3.9 MMOL/L (ref 3.5–5.2)
PROCALCITONIN SERPL-MCNC: 9.71 NG/ML (ref 0–0.25)
PROT SERPL-MCNC: 6.1 G/DL (ref 6–8.5)
RBC # BLD AUTO: 4.08 10*6/MM3 (ref 3.77–5.28)
SODIUM SERPL-SCNC: 142 MMOL/L (ref 136–145)
WBC NRBC COR # BLD AUTO: 16.06 10*3/MM3 (ref 3.4–10.8)

## 2024-01-10 PROCEDURE — 82948 REAGENT STRIP/BLOOD GLUCOSE: CPT

## 2024-01-10 PROCEDURE — 63710000001 INSULIN LISPRO (HUMAN) PER 5 UNITS: Performed by: NURSE PRACTITIONER

## 2024-01-10 PROCEDURE — 25010000002 ONDANSETRON PER 1 MG: Performed by: NURSE PRACTITIONER

## 2024-01-10 PROCEDURE — 25810000003 SODIUM CHLORIDE 0.9 % SOLUTION 250 ML FLEX CONT: Performed by: HOSPITALIST

## 2024-01-10 PROCEDURE — 84145 PROCALCITONIN (PCT): CPT | Performed by: HOSPITALIST

## 2024-01-10 PROCEDURE — XW033E5 INTRODUCTION OF REMDESIVIR ANTI-INFECTIVE INTO PERIPHERAL VEIN, PERCUTANEOUS APPROACH, NEW TECHNOLOGY GROUP 5: ICD-10-PCS | Performed by: HOSPITALIST

## 2024-01-10 PROCEDURE — 25810000003 LACTATED RINGERS PER 1000 ML: Performed by: NURSE PRACTITIONER

## 2024-01-10 PROCEDURE — 83735 ASSAY OF MAGNESIUM: CPT | Performed by: HOSPITALIST

## 2024-01-10 PROCEDURE — 80053 COMPREHEN METABOLIC PANEL: CPT | Performed by: HOSPITALIST

## 2024-01-10 PROCEDURE — 63710000001 INSULIN GLARGINE PER 5 UNITS: Performed by: NURSE PRACTITIONER

## 2024-01-10 PROCEDURE — 25010000002 REMDESIVIR 100 MG/20ML SOLUTION 1 EACH VIAL: Performed by: HOSPITALIST

## 2024-01-10 PROCEDURE — 70450 CT HEAD/BRAIN W/O DYE: CPT

## 2024-01-10 PROCEDURE — 85025 COMPLETE CBC W/AUTO DIFF WBC: CPT | Performed by: HOSPITALIST

## 2024-01-10 PROCEDURE — 99223 1ST HOSP IP/OBS HIGH 75: CPT | Performed by: INTERNAL MEDICINE

## 2024-01-10 RX ORDER — LEVOTHYROXINE SODIUM 112 UG/1
168 TABLET ORAL
Status: DISCONTINUED | OUTPATIENT
Start: 2024-01-14 | End: 2024-01-15 | Stop reason: HOSPADM

## 2024-01-10 RX ORDER — CASTOR OIL AND BALSAM, PERU 788; 87 MG/G; MG/G
1 OINTMENT TOPICAL DAILY
Status: DISCONTINUED | OUTPATIENT
Start: 2024-01-10 | End: 2024-01-15 | Stop reason: HOSPADM

## 2024-01-10 RX ORDER — ACETAMINOPHEN 325 MG/1
650 TABLET ORAL EVERY 6 HOURS PRN
Status: DISCONTINUED | OUTPATIENT
Start: 2024-01-10 | End: 2024-01-15 | Stop reason: HOSPADM

## 2024-01-10 RX ORDER — PREGABALIN 25 MG/1
50 CAPSULE ORAL 3 TIMES DAILY
Status: DISCONTINUED | OUTPATIENT
Start: 2024-01-10 | End: 2024-01-15 | Stop reason: HOSPADM

## 2024-01-10 RX ORDER — CASTOR OIL AND BALSAM, PERU 788; 87 MG/G; MG/G
1 OINTMENT TOPICAL EVERY 12 HOURS SCHEDULED
Status: DISCONTINUED | OUTPATIENT
Start: 2024-01-10 | End: 2024-01-10

## 2024-01-10 RX ORDER — IBUPROFEN 600 MG/1
1 TABLET ORAL
Status: DISCONTINUED | OUTPATIENT
Start: 2024-01-10 | End: 2024-01-15 | Stop reason: HOSPADM

## 2024-01-10 RX ORDER — DULOXETIN HYDROCHLORIDE 30 MG/1
30 CAPSULE, DELAYED RELEASE ORAL DAILY
Status: DISCONTINUED | OUTPATIENT
Start: 2024-01-10 | End: 2024-01-15 | Stop reason: HOSPADM

## 2024-01-10 RX ORDER — ATORVASTATIN CALCIUM 80 MG/1
80 TABLET, FILM COATED ORAL NIGHTLY
Status: DISCONTINUED | OUTPATIENT
Start: 2024-01-10 | End: 2024-01-15 | Stop reason: HOSPADM

## 2024-01-10 RX ORDER — INSULIN LISPRO 100 [IU]/ML
2-7 INJECTION, SOLUTION INTRAVENOUS; SUBCUTANEOUS
Status: DISCONTINUED | OUTPATIENT
Start: 2024-01-10 | End: 2024-01-15 | Stop reason: HOSPADM

## 2024-01-10 RX ORDER — AMLODIPINE BESYLATE 10 MG/1
10 TABLET ORAL DAILY
Status: DISCONTINUED | OUTPATIENT
Start: 2024-01-10 | End: 2024-01-15 | Stop reason: HOSPADM

## 2024-01-10 RX ORDER — DEXTROSE MONOHYDRATE 25 G/50ML
25 INJECTION, SOLUTION INTRAVENOUS
Status: DISCONTINUED | OUTPATIENT
Start: 2024-01-10 | End: 2024-01-15 | Stop reason: HOSPADM

## 2024-01-10 RX ORDER — VANCOMYCIN HYDROCHLORIDE 125 MG/1
125 CAPSULE ORAL EVERY 6 HOURS SCHEDULED
Status: DISCONTINUED | OUTPATIENT
Start: 2024-01-10 | End: 2024-01-15 | Stop reason: HOSPADM

## 2024-01-10 RX ORDER — VANCOMYCIN HYDROCHLORIDE 125 MG/1
125 CAPSULE ORAL EVERY 6 HOURS SCHEDULED
Status: DISCONTINUED | OUTPATIENT
Start: 2024-01-10 | End: 2024-01-10

## 2024-01-10 RX ORDER — NICOTINE POLACRILEX 4 MG
15 LOZENGE BUCCAL
Status: DISCONTINUED | OUTPATIENT
Start: 2024-01-10 | End: 2024-01-15 | Stop reason: HOSPADM

## 2024-01-10 RX ORDER — LEVOTHYROXINE SODIUM 112 UG/1
112 TABLET ORAL
Status: DISCONTINUED | OUTPATIENT
Start: 2024-01-11 | End: 2024-01-15 | Stop reason: HOSPADM

## 2024-01-10 RX ADMIN — DULOXETINE HYDROCHLORIDE 30 MG: 30 CAPSULE, DELAYED RELEASE ORAL at 12:24

## 2024-01-10 RX ADMIN — INSULIN LISPRO 4 UNITS: 100 INJECTION, SOLUTION INTRAVENOUS; SUBCUTANEOUS at 12:24

## 2024-01-10 RX ADMIN — CASTOR OIL AND BALSAM, PERU 1 APPLICATION: 788; 87 OINTMENT TOPICAL at 12:24

## 2024-01-10 RX ADMIN — PREGABALIN 50 MG: 25 CAPSULE ORAL at 21:13

## 2024-01-10 RX ADMIN — PREGABALIN 50 MG: 25 CAPSULE ORAL at 15:31

## 2024-01-10 RX ADMIN — SODIUM CHLORIDE, POTASSIUM CHLORIDE, SODIUM LACTATE AND CALCIUM CHLORIDE 100 ML/HR: 600; 310; 30; 20 INJECTION, SOLUTION INTRAVENOUS at 12:27

## 2024-01-10 RX ADMIN — Medication 1 APPLICATION: at 18:14

## 2024-01-10 RX ADMIN — AMLODIPINE BESYLATE 10 MG: 10 TABLET ORAL at 12:24

## 2024-01-10 RX ADMIN — INSULIN GLARGINE 20 UNITS: 100 INJECTION, SOLUTION SUBCUTANEOUS at 21:14

## 2024-01-10 RX ADMIN — ACETAMINOPHEN 650 MG: 325 TABLET, FILM COATED ORAL at 23:54

## 2024-01-10 RX ADMIN — INSULIN LISPRO 3 UNITS: 100 INJECTION, SOLUTION INTRAVENOUS; SUBCUTANEOUS at 21:14

## 2024-01-10 RX ADMIN — PREGABALIN 50 MG: 25 CAPSULE ORAL at 12:24

## 2024-01-10 RX ADMIN — ATORVASTATIN CALCIUM 80 MG: 80 TABLET, FILM COATED ORAL at 21:13

## 2024-01-10 RX ADMIN — REMDESIVIR 200 MG: 100 INJECTION, POWDER, LYOPHILIZED, FOR SOLUTION INTRAVENOUS at 01:55

## 2024-01-10 RX ADMIN — VANCOMYCIN HYDROCHLORIDE 125 MG: 125 CAPSULE ORAL at 23:51

## 2024-01-10 RX ADMIN — ACETAMINOPHEN 650 MG: 325 TABLET, FILM COATED ORAL at 03:34

## 2024-01-10 RX ADMIN — VANCOMYCIN HYDROCHLORIDE 125 MG: 125 CAPSULE ORAL at 18:10

## 2024-01-10 RX ADMIN — ONDANSETRON 4 MG: 2 INJECTION INTRAMUSCULAR; INTRAVENOUS at 15:31

## 2024-01-10 RX ADMIN — VANCOMYCIN HYDROCHLORIDE 125 MG: 125 CAPSULE ORAL at 12:24

## 2024-01-10 NOTE — PROGRESS NOTES
Clinical Pharmacy Services: Medication History    Halie Guidry is a 83 y.o. female presenting to [facility] for   Chief Complaint   Patient presents with    Abnormal Lab       She  has a past medical history of Acute metabolic encephalopathy (6/24/2022), Anxiety, Arthritis, Benign essential hypertension (08/20/2014), Bleeding disorder, Depression, Diabetes, Diabetes mellitus, type 2, Disc degeneration, lumbar, Headache, tension-type, Hyperlipidemia, Hypothyroidism, Neuropathy, Osteoporosis (09/09/2015), Peripheral neuropathy, Rotator cuff tear, left, Scoliosis, Shoulder pain, and Spinal stenosis.    Allergies as of 01/09/2024 - Reviewed 01/09/2024   Allergen Reaction Noted    Sulfa antibiotics Unknown - High Severity 10/08/2015       Medication information was obtained from: group home [facility]   Pharmacy and Phone Number:     Prior to Admission Medications       Prescriptions Last Dose Informant Patient Reported? Taking?    amLODIPine (NORVASC) 10 MG tablet  Nursing Home No Yes    Take 1 tablet by mouth Daily.    atorvastatin (LIPITOR) 80 MG tablet  Nursing Home No Yes    TAKE 1 TABLET EVERY NIGHT    Patient taking differently:  Take 1 tablet by mouth Every Night.    bisoprolol (ZEBeta) 5 MG tablet  Nursing Home No Yes    Take 1 tablet by mouth Daily.    chlorthalidone (HYGROTON) 25 MG tablet  Nursing Home No Yes    Take 1 tablet by mouth Daily.    dicyclomine (BENTYL) 20 MG tablet  Nursing Home Yes Yes    Take 1 tablet by mouth 3 (Three) Times a Day As Needed for Abdominal Cramping.    doxazosin (CARDURA) 1 MG tablet  Nursing Home Yes Yes    Take 1 tablet by mouth Daily As Needed.    DULoxetine (CYMBALTA) 30 MG capsule  Nursing Home No Yes    Take 1 capsule by mouth Daily.    furosemide (LASIX) 20 MG tablet  Nursing Home No Yes    Take 1 tablet by mouth Daily.    HYDROcodone-acetaminophen (NORCO)  MG per tablet  Nursing Home Yes Yes    Take 1 tablet by mouth 3 (Three) Times a Day.     hydrocortisone-zinc oxide-bacitracin-nystatin cream  Nursing Home No Yes    Apply 1 application topically to the appropriate area as directed 2 (Two) Times a Day As Needed (rash).    Insulin Glargine, 1 Unit Dial, (TOUJEO) 300 UNIT/ML solution pen-injector injection  Nursing Home No Yes    Inject 60 Units under the skin into the appropriate area as directed Daily.    Patient taking differently:  Inject 60 Units under the skin into the appropriate area as directed Every Night.    insulin lispro (HUMALOG/ADMELOG) 100 UNIT/ML injection  Nursing Home No Yes    Inject 2-7 Units under the skin into the appropriate area as directed 4 (Four) Times a Day Before Meals & at Bedtime.    Patient taking differently:  Inject 2-10 Units under the skin into the appropriate area as directed 3 (Three) Times a Day Before Meals. Sliding Scale    lactobacillus acidophilus (RISAQUAD) capsule capsule  Nursing Home No Yes    Take 1 capsule by mouth Daily for 11 doses.    lansoprazole (PREVACID) 30 MG capsule  Nursing Home No Yes    TAKE 1 CAPSULE DAILY    levothyroxine (SYNTHROID, LEVOTHROID) 112 MCG tablet  Nursing Home No Yes    levothyroxine 112 mcg 1 tablet daily, except on Sunday take 1.5 tablets.    Patient taking differently:  Take 1 tablet by mouth See Admin Instructions. Take everyday EXCEPT Sunday take 1.5 tab    levothyroxine (SYNTHROID, LEVOTHROID) 112 MCG tablet  Nursing Home Yes Yes    Take 1.5 tablets by mouth 1 (One) Time Per Week. Take on Sunday    lisinopril (PRINIVIL,ZESTRIL) 40 MG tablet  Nursing Home No Yes    Take 1 tablet by mouth Daily.    multivitamin with minerals (CertaVite Senior) tablet tablet  Nursing Home Yes Yes    Take 1 tablet by mouth Daily.    pregabalin (LYRICA) 50 MG capsule  Nursing Home No Yes    Take 1 capsule by mouth 3 (Three) Times a Day.    vancomycin (Vancocin) 125 MG capsule  Nursing Home Yes Yes    Take 1 capsule by mouth 4 (Four) Times a Day. Cdiff    vitamin B-12 (VITAMIN B-12) 1000 MCG  tablet  Nursing Home No Yes    Take 1 tablet by mouth Daily.    BD Pen Needle Naila 2nd Gen 32G X 4 MM misc   No No    USE TO INJECT INSULIN FOUR TIMES DAILY    Insulin Lispro, 1 Unit Dial, (HumaLOG KwikPen) 100 UNIT/ML solution pen-injector   No No    Inject 5 Units under the skin into the appropriate area as directed 3 (Three) Times a Day Before Meals. Give 20 units plus 2 units per 50 over 150 mg/dl before meals. Max daily dose of 90 units.              Medication notes:     This medication list is complete to the best of my knowledge as of 1/9/2024    Please call if questions.    Thang Leach  Medication History Technician   [phone]    1/9/2024 19:35 EST

## 2024-01-10 NOTE — PLAN OF CARE
Goal Outcome Evaluation:      Pt is A&Ox2-3. She is sometimes confused to situation. Remdesivir given per order. IV fluids currently infusing. Midline removed per [facility]. VSS.

## 2024-01-10 NOTE — DISCHARGE PLACEMENT REQUEST
"Tabatha Guidry (83 y.o. Female)       Date of Birth   1940    Social Security Number       [place]   [facility] [address] [place] [address]    Home Phone   [phone]    MRN   [id number]       Adventism   Worship    Marital Status                               Admission Date   1/9/24    Admission Type   Emergency    Admitting Provider   Ulisses Lobo MD    Attending Provider   Ulisses Lobo MD    Department, Room/Bed   [address], [address]       Discharge Date       Discharge Disposition       Discharge Destination                                 Attending Provider: Ulisses Lobo MD    Allergies: Sulfa Antibiotics    Isolation: Enh Drop/Con, Spore, Contact   Infection: Candida Auris (rule out) (12/27/23), COVID (confirmed) (01/09/24)   Code Status: CPR    Ht: 172.7 cm (67.99\")   Wt: 89 kg (196 lb 3.4 oz)    Admission Cmt: None   Principal Problem: VIPUL (acute kidney injury) [N17.9]                   Active Insurance as of 1/9/2024       Primary Coverage       Payor Plan Insurance Group Employer/Plan Group    [facility] MEDICARE REPLACEMENT [facility] [employer] [id number]       Payor Plan Address Payor Plan Phone Number Payor Plan Fax Number Effective Dates    [address] [address]   1/1/2023 - None Entered    [facility] [address]         Subscriber Name Subscriber Birth Date Member ID       TABATHA GUIDRY 1940 [id number]                     Emergency Contacts        (Rel.) Home Phone Work Phone Mobile Phone    Derrick Guidry (Son) [phone] -- [phone]    BreaJosse (Son) [phone] -- [phone]    MONTANAJAYME EDWARDS (Grandchild) -- -- [phone]                " No

## 2024-01-10 NOTE — PROGRESS NOTES
Nutrition Services    Patient Name:  Halie Guidry  YOB: 1940  MRN: [id number]  Admit Date:  1/9/2024  Assessment Date:  01/10/24    Summary: Nutrition screen completed for skin integrity  This is a 82 yo female with elevated white blood cell count, Cdiff, COVID, diabetic foot ulcer. She has hx of being bedridden/impaired mobility.    Pt c/o malaise and diarrhea, and decrease po intake, 25%.  Labs: glu 211, BUN 41, cr 1.45, procal 9.71  Skin issues noted  IVF's at 50ml/hr    Plan/Recommendation  Good po intake encouraged  Will added Chris BID    RD to follow    CLINICAL NUTRITION ASSESSMENT      Reason for Assessment Pressure Injury and/or Non-Healing Wound     Diagnosis/Problem   elevated white blood cell count, Cdiff, COVID, diabetic foot ulcer   Medical/Surgical History Past Medical History:   Diagnosis Date    Acute metabolic encephalopathy 6/24/2022    Anxiety     Arthritis     Benign essential hypertension 08/20/2014    Bleeding disorder     Depression     Diabetes     Diabetes mellitus, type 2     Disc degeneration, lumbar     Headache, tension-type     Hyperlipidemia     Hypothyroidism     Neuropathy     Osteoporosis 09/09/2015    Peripheral neuropathy     Rotator cuff tear, left     Scoliosis     Shoulder pain     LEFT, TORN ROTATOR CUFF S/P FALL    Spinal stenosis        Past Surgical History:   Procedure Laterality Date    APPENDECTOMY      BILATERAL BREAST REDUCTION Bilateral 08/2015    CATARACT EXTRACTION  03/2015    CHOLECYSTECTOMY WITH INTRAOPERATIVE CHOLANGIOGRAM N/A 3/27/2022    Procedure: CHOLECYSTECTOMY LAPAROSCOPIC INTRAOPERATIVE CHOLANGIOGRAM;  Surgeon: Aiyana Hill MD;  Location: [facility] OR;  Service: General;  Laterality: N/A;    COLONOSCOPY  06/05/2015    WNL    ERCP N/A 2/25/2022    Procedure: ENDOSCOPIC RETROGRADE CHOLANGIOPANCREATOGRAPHY with sphincterotomy and balloon sweep;  Surgeon: Chilo Wilhelm MD;  [facility];  Service:  "Gastroenterology;  Laterality: N/A;  PRE/POST - CBD stones    ERCP N/A 3/28/2022    Procedure: ENDOSCOPIC RETROGRADE CHOLANGIOPANCREATOGRAPHY WITH SPHINCTEROTOMY AND BALLOON SWEEP;  Surgeon: Chilo Wilhelm MD;  Location: [facility] ENDOSCOPY;  Service: Gastroenterology;  Laterality: N/A;  PRE: COMMON DUCT STONE  POST: COMMON DUCT STONE    KYPHOPLASTY      REDUCTION MAMMAPLASTY      TONSILLECTOMY          Anthropometrics        Current Height  Current Weight  BMI kg/m2 Height: 172.7 cm (67.99\")  Weight: 89 kg (196 lb 3.4 oz) (01/10/24 0027)  Body mass index is 29.84 kg/m².   Adjusted BMI (if applicable)    BMI Category Overweight (25 - 29.9)   Ideal Body Weight (IBW) 140lb   Usual Body Weight (UBW) 160-210   Weight Trend Loss, Gain   Weight History Wt Readings from Last 30 Encounters:   01/10/24 0027 89 kg (196 lb 3.4 oz)   01/09/24 1713 95.1 kg (209 lb 10.5 oz)   12/24/23 2324 95.1 kg (209 lb 9.6 oz)   12/24/23 1921 81.6 kg (180 lb)   12/11/23 1558 83.6 kg (184 lb 6.4 oz)   10/16/23 1602 93.5 kg (206 lb 3.2 oz)   09/23/23 2134 86.2 kg (190 lb)   08/28/23 1449 87.5 kg (193 lb)   08/24/23 1431 83 kg (183 lb)   07/29/23 0538 93.1 kg (205 lb 4 oz)   07/28/23 0500 93.8 kg (206 lb 12.7 oz)   07/27/23 0606 94.7 kg (208 lb 12.4 oz)   07/26/23 0548 94.9 kg (209 lb 3.5 oz)   07/25/23 0539 87.2 kg (192 lb 3.9 oz)   07/24/23 0520 89.7 kg (197 lb 12 oz)   07/23/23 0600 89.4 kg (197 lb 1.5 oz)   07/22/23 0513 88.7 kg (195 lb 8.8 oz)   07/21/23 2112 88.6 kg (195 lb 5.2 oz)   07/21/23 1835 77.1 kg (170 lb)   06/02/23 1510 77.2 kg (170 lb 3.2 oz)   05/25/23 1339 77.1 kg (170 lb)   05/12/23 2206 77.1 kg (170 lb)   05/15/23 0653 77.1 kg (170 lb)   12/06/22 1411 72.6 kg (160 lb)   12/02/22 1408 72.6 kg (160 lb)   11/06/22 1632 72.6 kg (160 lb)   11/04/22 0519 93.7 kg (206 lb 9.1 oz)   09/12/22 1427 86.6 kg (191 lb)   09/01/22 2027 81.6 kg (180 lb)   06/23/22 1018 85.3 kg (188 lb)   06/23/22 0400 85.3 kg (188 lb)   06/09/22 1425 85.3 " kg (188 lb)   06/02/22 1349 84.6 kg (186 lb 9.6 oz)   05/27/22 0217 80 kg (176 lb 5.9 oz)   05/26/22 2201 79.4 kg (175 lb)   03/28/22 0610 83.9 kg (185 lb)   03/26/22 1023 83.9 kg (185 lb)   02/24/22 1500 84 kg (185 lb 3 oz)   02/23/22 1019 84 kg (185 lb 3.3 oz)   02/23/22 0355 77.1 kg (170 lb)   12/02/21 1601 85.7 kg (189 lb)   12/02/21 0423 85.9 kg (189 lb 6 oz)   11/01/21 1216 77.1 kg (170 lb)   06/08/21 1502 80.7 kg (178 lb)   01/18/21 1336 87.1 kg (192 lb)   11/30/20 1317 85.3 kg (188 lb)   06/18/20 1345 69.4 kg (153 lb)   01/27/20 1508 77.1 kg (170 lb)      --  Labs       Pertinent Labs    Results from last 7 days   Lab Units 01/10/24  0854 01/09/24  1758   SODIUM mmol/L 142 139   POTASSIUM mmol/L 3.9 4.0   CHLORIDE mmol/L 112* 110*   CO2 mmol/L 14.3* 15.8*   BUN mg/dL 41* 57*   CREATININE mg/dL 1.45* 2.03*   CALCIUM mg/dL 8.4* 8.4*   BILIRUBIN mg/dL 0.2 0.2   ALK PHOS U/L 95 94   ALT (SGPT) U/L 11 11   AST (SGOT) U/L 22 24   GLUCOSE mg/dL 146* 212*     Results from last 7 days   Lab Units 01/10/24  0854   MAGNESIUM mg/dL 2.0   HEMOGLOBIN g/dL 10.5*   HEMATOCRIT % 33.0*   WBC 10*3/mm3 16.06*   ALBUMIN g/dL 2.9*     Results from last 7 days   Lab Units 01/10/24  0854 01/09/24  1758   PLATELETS 10*3/mm3 350 351     COVID19   Date Value Ref Range Status   01/09/2024 Detected (C) Not Detected - Ref. Range Final     Lab Results   Component Value Date    HGBA1C 8.20 (H) 12/24/2023          Medications           Scheduled Medications amLODIPine, 10 mg, Oral, Daily  atorvastatin, 80 mg, Oral, Nightly  castor oil-balsam peru, 1 application , Topical, Daily  DULoxetine, 30 mg, Oral, Daily  insulin glargine, 20 Units, Subcutaneous, BID  insulin lispro, 2-7 Units, Subcutaneous, 4x Daily AC & at Bedtime  [START ON 1/11/2024] levothyroxine, 112 mcg, Oral, Once per day on Monday Tuesday Wednesday Thursday Friday Saturday  [START ON 1/14/2024] levothyroxine, 168 mcg, Oral, Every Sunday  pregabalin, 50 mg, Oral, TID  [START ON  1/11/2024] remdesivir, 100 mg, Intravenous, Q24H  vancomycin, 125 mg, Oral, Q6H       Infusions lactated ringers, 50 mL/hr, Last Rate: 100 mL/hr (01/10/24 1227)  Pharmacy Consult - Remdesivir,   Pharmacy Consult,        PRN Medications   acetaminophen    aluminum-magnesium hydroxide-simethicone    cadexomer iodine    dextrose    dextrose    glucagon (human recombinant)    nitroglycerin    ondansetron ODT **OR** ondansetron    Pharmacy Consult - Remdesivir    Pharmacy Consult    sodium chloride     Physical Findings          General Findings obese, oriented, room air, sometimes disoriented per RN   Oral/Mouth Cavity tooth or teeth missing   Edema  1+ (trace), 2+ (mild)   Gastrointestinal fecal incontinence   Skin  diabetic ulcer, non-healing wound(s), pressure injury:   Right anterior third toe eschar/scab  Left heel pI DT  Left posterior plantar Diabetic Ulcer   Tubes/Drains/Lines none   NFPE Not indicated at this time   --  Estimated/Assessed Needs        Current Weight  Weight: 89 kg (196 lb 3.4 oz) (01/10/24 0027)       Energy Requirements    Weight for Calculation 89 kg   Method for Estimation  20 kcal/kg   EST Needs (kcal/day) 1780       Protein Requirements    Weight for Calculation 63.9 kg   EST Protein Needs (g/kg) 1.2 - 1.5 gm/kg   EST Daily Needs (g/day) 76-95       Fluid Requirements     Method for Estimation 1800    EST Needs (mL/day)      Current Nutrition Orders & Evaluation of Intake       Oral Nutrition     Food Allergies NKFA   Current PO Diet Diet: Cardiac Diets, Diabetic Diets; Healthy Heart (2-3 Na+); Consistent Carbohydrate; Texture: Regular Texture (IDDSI 7); Fluid Consistency: Thin (IDDSI 0)   Supplement n/a   PO Evaluation     % PO Intake 25%    Factors Affecting Intake: decreased appetite, diarrhea, weakness   --  PES STATEMENT / NUTRITION DIAGNOSIS      Nutrition Dx Problem  Problem: Inadequate Oral Intake  Etiology: Factors Affecting Nutrition - decrease appetite     Signs/Symptoms: PO  intake and Report/Observation     NUTRITION INTERVENTION / PLAN OF CARE      Intervention Goal(s) Maintain nutrition status, Reduce/improve symptoms, Disease management/therapy, Tolerate PO , Increase intake, No significant weight loss, and PO intake goal %: 75+         RD Intervention/Action Interview for preferences, Encourage intake, Continue to monitor, Care plan reviewed, and Recommend/order: ONS   --      Prescription/Orders:       PO Diet       Supplements Chris BID      Enteral Nutrition       Parenteral Nutrition    New Prescription Ordered? Yes   --      Monitor/Evaluation Per protocol   Discharge Plan/Needs Pending clinical course   --    RD to follow per protocol.      Electronically signed by:  Kaitlin Heard RD  01/10/24 14:15 EST

## 2024-01-10 NOTE — CONSULTS
"Referring Provider: Dr Lobo    Reason for Consultation: COViD, CDiff, recent foot infection     History of present illness:  Halie Guidry is a 83 y.o. who I am asked to evaluate and give opinion for \"COViD Riley, recent foot infection.\" History is obtained from the patient and review of the old medical records which I summarize/synthesize as follows: I saw her in the hospital on 12/28/2023 at which time she was admitted for a diabetic left foot ulcer for which she underwent bedside debridement with podiatry.  Her cultures were positive for Enterobacter and MSSA.  There were not good oral options to treat both organisms so I recommended that she be discharged on cefepime 1 g IV every 12 hours to complete a 10-day course with a stop date of 1/3/2024.  Due to a history of C. difficile, I also placed her on a probiotic.    She presented to the emergency room yesterday at about 5 PM from her living facility with an elevated white blood cell count that had been drawn the day prior.  She was also having new onset diarrhea and it sounds like she had been diagnosed with C. difficile at her facility and started on oral vancomycin.  She thinks this was started around 1/3/2024 but is not entirely certain.  She had also been diagnosed with COVID-19.  She reports a mild cough.    In the emergency room she was afebrile.  Labs were notable for a WBC 19, lactate 0.9, creatinine 2, and procalcitonin 17.4.  Her respiratory panel was positive for COVID-19 as expected.  Her chest x-ray was negative for pneumonia.  She was started on remdesivir rather than Paxlovid since she did have a documented oxygen saturation of 91%..    She says she is feeling better today.    Past Medical History:   Diagnosis Date    Acute metabolic encephalopathy 6/24/2022    Anxiety     Arthritis     Benign essential hypertension 08/20/2014    Bleeding disorder     Depression     Diabetes     Diabetes mellitus, type 2     Disc degeneration, lumbar     " Headache, tension-type     Hyperlipidemia     Hypothyroidism     Neuropathy     Osteoporosis 09/09/2015    Peripheral neuropathy     Rotator cuff tear, left     Scoliosis     Shoulder pain     LEFT, TORN ROTATOR CUFF S/P FALL    Spinal stenosis        Past Surgical History:   Procedure Laterality Date    APPENDECTOMY      BILATERAL BREAST REDUCTION Bilateral 08/2015    CATARACT EXTRACTION  03/2015    CHOLECYSTECTOMY WITH INTRAOPERATIVE CHOLANGIOGRAM N/A 3/27/2022    Procedure: CHOLECYSTECTOMY LAPAROSCOPIC INTRAOPERATIVE CHOLANGIOGRAM;  Surgeon: Aiyana Hill MD;  Location: [facility] MAIN OR;  Service: General;  Laterality: N/A;    COLONOSCOPY  06/05/2015    WNL    ERCP N/A 2/25/2022    Procedure: ENDOSCOPIC RETROGRADE CHOLANGIOPANCREATOGRAPHY with sphincterotomy and balloon sweep;  Surgeon: Chilo Wilhelm MD;  Location: [facility] ENDOSCOPY;  Service: Gastroenterology;  Laterality: N/A;  PRE/POST - CBD stones    ERCP N/A 3/28/2022    Procedure: ENDOSCOPIC RETROGRADE CHOLANGIOPANCREATOGRAPHY WITH SPHINCTEROTOMY AND BALLOON SWEEP;  Surgeon: Chilo Wilhelm MD;  Location: [facility] ENDOSCOPY;  Service: Gastroenterology;  Laterality: N/A;  PRE: COMMON DUCT STONE  POST: COMMON DUCT STONE    KYPHOPLASTY      REDUCTION MAMMAPLASTY      TONSILLECTOMY       Antibiotic allergies and intolerances:  Sulfa     Medications:    Current Facility-Administered Medications:     acetaminophen (TYLENOL) tablet 650 mg, 650 mg, Oral, Q6H PRN, Beena Morgan APRN, 650 mg at 01/10/24 0334    aluminum-magnesium hydroxide-simethicone (MAALOX MAX) 400-400-40 MG/5ML suspension 15 mL, 15 mL, Oral, Q6H PRN, Ceci Ram APRN    dextrose (D50W) (25 g/50 mL) IV injection 25 g, 25 g, Intravenous, Q15 Min PRN, Ceci Ram APRN    dextrose (GLUTOSE) oral gel 15 g, 15 g, Oral, Q15 Min PRN, Ceci Ram APRN    glucagon (GLUCAGEN) injection 1 mg, 1 mg, Intramuscular, Q15 Min PRN, Ceci Ram APRN    insulin  lispro (HUMALOG/ADMELOG) injection 2-9 Units, 2-9 Units, Subcutaneous, 4x Daily AC & at Bedtime, Ceci Ram APRN, 2 Units at 01/09/24 2202    lactated ringers infusion, 100 mL/hr, Intravenous, Continuous, Ceci Ram APRN, Last Rate: 100 mL/hr at 01/10/24 0410, 100 mL/hr at 01/10/24 0410    nitroglycerin (NITROSTAT) SL tablet 0.4 mg, 0.4 mg, Sublingual, Q5 Min PRN, Ceci Ram APRN    ondansetron ODT (ZOFRAN-ODT) disintegrating tablet 4 mg, 4 mg, Oral, Q6H PRN **OR** ondansetron (ZOFRAN) injection 4 mg, 4 mg, Intravenous, Q6H PRN, Ceci Ram APRN    Pharmacy Consult - Remdesivir for Severe COVID-19 (Within 7 days of symptom onset), , Does not apply, Continuous PRN, Ceci Ram APRN    [COMPLETED] remdesivir 200 mg in sodium chloride 0.9 % 290 mL IVPB (powder vial), 200 mg, Intravenous, Q24H, Stopped at 01/10/24 0355 **FOLLOWED BY** [START ON 1/11/2024] remdesivir 100 mg in sodium chloride 0.9 % 250 mL IVPB (powder vial), 100 mg, Intravenous, Q24H, Vahe Ruiz MD    sodium chloride 0.9 % flush 10 mL, 10 mL, Intravenous, PRN, Ceci Ram APRN      Objective   Vital Signs   Temp:  [97.6 °F (36.4 °C)-98.8 °F (37.1 °C)] 97.9 °F (36.6 °C)  Heart Rate:  [61-79] 69  Resp:  [16-18] 16  BP: (104-152)/(53-93) 149/57    Physical Exam:   General: awake, alert, in bed, very nice  Eyes: no scleral icterus  Cardiovascular: NR  Respiratory: normal work of breathing on room air  GI: Abdomen is soft, not tender  :  no Baig catheter  MSK: No open wounds or drainage  Neurological: Alert and oriented x 3  Psychiatric: Normal mood and affect   Vasc: PIV w/o erythema    Labs:     Lab Results   Component Value Date    WBC 19.14 (H) 01/09/2024    HGB 11.5 (L) 01/09/2024    HCT 33.9 (L) 01/09/2024    MCV 82.3 01/09/2024     01/09/2024       Lab Results   Component Value Date    GLUCOSE 212 (H) 01/09/2024    BUN 57 (H) 01/09/2024    CREATININE 2.03 (H) 01/09/2024     EGFRIFNONA 51 (L) 02/28/2022    EGFRIFAFRI 50 (L) 01/18/2021    BCR 28.1 (H) 01/09/2024    CO2 15.8 (L) 01/09/2024    CALCIUM 8.4 (L) 01/09/2024    PROTENTOTREF 7.2 06/02/2023    ALBUMIN 3.1 (L) 01/09/2024    LABIL2 1.3 06/02/2023    AST 24 01/09/2024    ALT 11 01/09/2024     Procal 17    Microbiology:  1/9/2024 RPP: + COVID-19  1/9/2024 BCx: Pending    Radiology:  CXR personally reviewed and is negative for pneumonia      ASSESSMENT/PLAN:  COVID-19 infection  Probable C. difficile diarrhea  Recent diabetic left foot ulcer with infection due to MSSA and Enterobacter  Elevated procalcitonin  Leukocytosis  Acute kidney injury    For COVID-19, I recommend that we continue remdesivir for the 5-day course.  However, since she is on room air if she continues to improve quickly then it could be truncated to a 3-day course.  Since she is on room air I do not recommend dexamethasone at this time.    Continue outpatient C. difficile treatment with vancomycin 125 mg p.o. every 6 hours x 10 days.  I will set the stop date for 1/15/2024.  C. difficile could be the cause of her elevated procalcitonin.  I suspect acute kidney injury is also contributing.  Since her chest x-ray does not show any evidence of pneumonia, I do not see any reason to start her on broad-spectrum antibacterials.    I will follow-up her cultures.  Repeat CBC and BMP in the a.m. for monitoring.    ID will follow.

## 2024-01-10 NOTE — NURSING NOTE
01/10/24 0840   Wound 01/10/24 0840 Left heel Pressure Injury   Placement Date/Time: 01/10/24 0840   Present on Original Admission: Yes  Side: Left  Location: heel  Primary Wound Type: Pressure Injury   Pressure Injury Stage DTPI   Base non-blanchable;purple   Periwound intact   Periwound Temperature warm   Periwound Skin Turgor soft   Skin Interventions   Pressure Reduction Devices alternating pressure pump (ADD)  (Accumax pump)   Pressure Reduction Techniques heels elevated off bed;positioned off wounds;pressure points protected     Wound/Ostomy: Consult received regarding skin issue on left Foot. Patient is alert, able to turn with assistance, upon assessment we could see full-thickness skin and tissue loss on left foot (metatarsal area) and left third Toe with  eschar/scab noted, tender to touch, draining scant serosanguineous content, possibly related with the Diabetic Foot. MRI not evidence of acute osteomyelitis.   She is at risk for further skin problems, is bedridden, impaired mobility, repositioning her every two hours and minimizing any skin contact with urine or stool would be beneficial.  Sacrococcygeal area and rt Heel remains with normal coloration and blanchable.  In addition is observed localized area of purple discoloration no blanchable on left Heel, DTI POA.  Unna Boots ordered.  Wound care order and nursing intervention have been implemented into Epic.   Please re-consult for any additional needs.

## 2024-01-10 NOTE — PROGRESS NOTES
Discharge Planning Assessment  [facility]     Patient Name: Halie Guidry  MRN: [id number]  Today's Date: 1/10/2024    Admit Date: 1/9/2024    Plan: Return to [facility] pending pre-cert and bed availability   Discharge Needs Assessment       Row Name 01/10/24 1443       Living Environment    People in Home child(beatriz), adult    Name(s) of People in Home Ghanshyam Meza    Current Living Arrangements extended care facility    Primary Care Provided by other (see comments)    Provides Primary Care For no one, unable/limited ability to care for self    Family Caregiver if Needed child(beatriz), adult    Family Caregiver Names Son Derrick [phone]    Quality of Family Relationships helpful    Able to Return to Prior Arrangements yes  Will return to SNF       Resource/Environmental Concerns    Resource/Environmental Concerns none    Transportation Concerns none       Food Insecurity    Within the past 12 months, you worried that your food would run out before you got the money to buy more. Never true    Within the past 12 months, the food you bought just didn't last and you didn't have money to get more. Never true       Transition Planning    Patient/Family Anticipates Transition to inpatient rehabilitation facility    Patient/Family Anticipated Services at Transition skilled nursing;rehabilitation services    Transportation Anticipated health plan transportation       Discharge Needs Assessment    Readmission Within the Last 30 Days current reason for admission unrelated to previous admission    Equipment Currently Used at Home wheelchair;walker, standard;cane, straight    Concerns to be Addressed discharge planning    Anticipated Changes Related to Illness none    Equipment Needed After Discharge none    Provided Post Acute Provider List? N/A    N/A Provider List Comment Has been at [facility] and plans to return                   Discharge Plan       Row Name 01/10/24 1446       Plan    Plan Return to  [facility] SN[facility] pending pre-cert and bed availability    Patient/Family in Agreement with Plan yes    Plan Comments Spoke with son Derrick [phone] by telephone (HCS - AD in EPIC).  Patient normally lives with son in 1st floor condo.  She is currently in rehab at [facility] and plan is to return. Per Mame, patient is from a skilled bed and can return pending bed availability and pre-cert.  Packet in CCP office.  Has used [facility]  in the past and has been to [facility] for rehab in the past.  [facility] will follow.  Connor MILLER                  Continued Care and Services - Admitted Since 1/9/2024       Destination       Service Provider Request Status Selected Services Address Phone Fax Patient Preferred    [facility] Pending - Request Sent N/A [address] [facility] [address] [phone] --                  Selected Continued Care - Prior Encounters Includes continued care and service providers with selected services from prior encounters from 10/11/2023 to 1/10/2024      Discharged on 12/30/2023 Admission date: 12/24/2023 - Discharge disposition: Skilled Nursing Facility (DC - External)      Destination       Service Provider Selected Services Address Phone Fax Patient Preferred    [facility] [id number] [facility] [address] [phone] --                          Expected Discharge Date and Time       Expected Discharge Date Expected Discharge Time    Jan 13, 2024            Demographic Summary       Row Name 01/10/24 1441       General Information    Admission Type inpatient    Arrived From subacute/long-term acute care    Required Notices Provided Important Message from Medicare    Referral Source admission list    Reason for Consult discharge planning    Preferred Language English                   Functional Status       Row Name 01/10/24 1442       Functional Status    Usual Activity Tolerance  moderate    Current Activity Tolerance fair       Functional Status, IADL    Medications completely dependent    Meal Preparation completely dependent    Housekeeping completely dependent    Laundry completely dependent    Shopping completely dependent       Mental Status    General Appearance WDL --  Unable to assess       Mental Status Summary    Recent Changes in Mental Status/Cognitive Functioning unable to assess                         Becky S. [place], RN

## 2024-01-10 NOTE — ED NOTES
Nursing report ED to floor  Halie Guidry  83 y.o.  female    HPI :   Chief Complaint   Patient presents with    Abnormal Lab       Admitting doctor:   Vahe Ruiz MD    Admitting diagnosis:   The primary encounter diagnosis was Acute renal insufficiency. Diagnoses of Leukocytosis, unspecified type, C. difficile colitis, and COVID-19 were also pertinent to this visit.    Code status:   Current Code Status       Date Active Code Status Order ID Comments User Context       1/9/2024 2002 CPR (Attempt to Resuscitate) [id number]  Ceci Ram APRN ED        Question Answer    Code Status (Patient has no pulse and is not breathing) CPR (Attempt to Resuscitate)    Medical Interventions (Patient has pulse or is breathing) Full Support                    Allergies:   Sulfa antibiotics    Isolation:   Enhanced Droplet/Contact , Contact Spore    Intake and Output    Intake/Output Summary (Last 24 hours) at 1/9/2024 2103  Last data filed at 1/9/2024 1841  Gross per 24 hour   Intake 500 ml   Output --   Net 500 ml       Weight:       01/09/24  1713   Weight: 95.1 kg (209 lb 10.5 oz)       Most recent vitals:   Vitals:    01/09/24 1903 01/09/24 1931 01/09/24 2031 01/09/24 2033   BP:  142/62 149/65    Pulse:  73 78    Resp:       Temp:       TempSrc:       SpO2: 95% 92%  95%   Weight:       Height:           Active LDAs/IV Access:   Lines, Drains & Airways       Active LDAs       Name Placement date Placement time Site Days    Midline Catheter - Single Lumen 12/28/23 Left Basilic 12/28/23  1145  -- 12    Peripheral IV 12/27/23 1357 Anterior;Right Forearm 12/27/23  1357  Forearm  13    Peripheral IV 01/09/24 1823 Right Antecubital 01/09/24  1823  Antecubital  less than 1    External Urinary Catheter 12/24/23  2340  --  15                    Labs (abnormal labs have a star):   Labs Reviewed   RESPIRATORY PANEL PCR W/ COVID-19 (SARS-COV-2), NP SWAB IN UTM/VTP, 2 HR TAT - Abnormal; Notable for the following  components:       Result Value    COVID19 Detected (*)     All other components within normal limits    Narrative:     In the setting of a positive respiratory panel with a viral infection PLUS a negative procalcitonin without other underlying concern for bacterial infection, consider observing off antibiotics or discontinuation of antibiotics and continue supportive care. If the respiratory panel is positive for atypical bacterial infection (Bordetella pertussis, Chlamydophila pneumoniae, or Mycoplasma pneumoniae), consider antibiotic de-escalation to target atypical bacterial infection.   COMPREHENSIVE METABOLIC PANEL - Abnormal; Notable for the following components:    Glucose 212 (*)     BUN 57 (*)     Creatinine 2.03 (*)     Chloride 110 (*)     CO2 15.8 (*)     Calcium 8.4 (*)     Albumin 3.1 (*)     BUN/Creatinine Ratio 28.1 (*)     eGFR 23.9 (*)     All other components within normal limits    Narrative:     GFR Normal >60  Chronic Kidney Disease <60  Kidney Failure <15    The GFR formula is only valid for adults with stable renal function between ages 18 and 70.   URINALYSIS W/ MICROSCOPIC IF INDICATED (NO CULTURE) - Abnormal; Notable for the following components:    Blood, UA Moderate (2+) (*)     Protein,  mg/dL (2+) (*)     All other components within normal limits   PROCALCITONIN - Abnormal; Notable for the following components:    Procalcitonin 17.40 (*)     All other components within normal limits    Narrative:     As a Marker for Sepsis (Non-Neonates):    1. <0.5 ng/mL represents a low risk of severe sepsis and/or septic shock.  2. >2 ng/mL represents a high risk of severe sepsis and/or septic shock.    As a Marker for Lower Respiratory Tract Infections that require antibiotic therapy:    PCT on Admission    Antibiotic Therapy       6-12 Hrs later    >0.5                Strongly Recommended  >0.25 - <0.5        Recommended   0.1 - 0.25          Discouraged              Remeasure/reassess  "PCT  <0.1                Strongly Discouraged     Remeasure/reassess PCT    As 28 day mortality risk marker: \"Change in Procalcitonin Result\" (>80% or <=80%) if Day 0 (or Day 1) and Day 4 values are available. Refer to http://www.[facility]-pct-calculator.com    Change in PCT <=80%  A decrease of PCT levels below or equal to 80% defines a positive change in PCT test result representing a higher risk for 28-day all-cause mortality of patients diagnosed with severe sepsis for septic shock.    Change in PCT >80%  A decrease of PCT levels of more than 80% defines a negative change in PCT result representing a lower risk for 28-day all-cause mortality of patients diagnosed with severe sepsis or septic shock.      CBC WITH AUTO DIFFERENTIAL - Abnormal; Notable for the following components:    WBC 19.14 (*)     Hemoglobin 11.5 (*)     Hematocrit 33.9 (*)     Neutrophil % 82.5 (*)     Lymphocyte % 6.5 (*)     Immature Grans % 1.3 (*)     Neutrophils, Absolute 15.79 (*)     Monocytes, Absolute 1.21 (*)     Eosinophils, Absolute 0.45 (*)     Immature Grans, Absolute 0.25 (*)     All other components within normal limits   URINALYSIS, MICROSCOPIC ONLY - Abnormal; Notable for the following components:    RBC, UA 3-5 (*)     WBC, UA 3-5 (*)     All other components within normal limits   LACTIC ACID, PLASMA - Normal   BLOOD CULTURE   BLOOD CULTURE   POCT GLUCOSE FINGERSTICK   POCT GLUCOSE FINGERSTICK   POCT GLUCOSE FINGERSTICK   POCT GLUCOSE FINGERSTICK   CBC AND DIFFERENTIAL    Narrative:     The following orders were created for panel order CBC & Differential.  Procedure                               Abnormality         Status                     ---------                               -----------         ------                     CBC Auto Differential[[id number]]        Abnormal            Final result                 Please view results for these tests on the individual orders.       EKG:   No orders to display       Meds given " in ED:   Medications   sodium chloride 0.9 % flush 10 mL (has no administration in time range)   nitroglycerin (NITROSTAT) SL tablet 0.4 mg (has no administration in time range)   ondansetron ODT (ZOFRAN-ODT) disintegrating tablet 4 mg (has no administration in time range)     Or   ondansetron (ZOFRAN) injection 4 mg (has no administration in time range)   aluminum-magnesium hydroxide-simethicone (MAALOX MAX) 400-400-40 MG/5ML suspension 15 mL (has no administration in time range)   dextrose (GLUTOSE) oral gel 15 g (has no administration in time range)   dextrose (D50W) (25 g/50 mL) IV injection 25 g (has no administration in time range)   glucagon (GLUCAGEN) injection 1 mg (has no administration in time range)   insulin lispro (HUMALOG/ADMELOG) injection 2-9 Units (has no administration in time range)   lactated ringers infusion (100 mL/hr Intravenous New Bag 24)   Pharmacy Consult - Remdesivir for Severe COVID-19 (Within 7 days of symptom onset) (has no administration in time range)   remdesivir 200 mg in sodium chloride 0.9 % 290 mL IVPB (powder vial) (has no administration in time range)     Followed by   remdesivir 100 mg in sodium chloride 0.9 % 250 mL IVPB (powder vial) (has no administration in time range)   sodium chloride 0.9 % bolus 500 mL (0 mL Intravenous Stopped 24 184)       Imaging results:  XR Chest 1 View    Result Date: 2024  1. No acute process.   This report was finalized on 2024 6:26 PM by Dr. Mario Alberto Moura M.D on Workstation: [id number]       Ambulatory status:   - assist x 1     Social issues:   Social History     Socioeconomic History    Marital status:     Number of children: 3   Tobacco Use    Smoking status: Former     Packs/day: 1.00     Years: 40.00     Additional pack years: 0.00     Total pack years: 40.00     Types: Cigarettes     Quit date:      Years since quittin.0    Smokeless tobacco: Never   Vaping Use    Vaping Use: Never used    Substance and Sexual Activity    Alcohol use: No    Drug use: No    Sexual activity: Defer       NIH Stroke Scale:       Devon Power RN  01/09/24 21:03 EST

## 2024-01-10 NOTE — H&P
Patient Name:  Halie Guidry  YOB: 1940  MRN:  [id number]  Admit Date:  1/9/2024  Patient Care Team:  Napoleon Mead MD as PCP - General  Napoleon Mead MD as PCP - Family Medicine  Lilia Orona APRN as Nurse Practitioner (Nurse Practitioner)  Beena Laguerre APRN as Referring Physician (Gastroenterology)  Napoleon Gutierrez MD as Consulting Physician (Hematology and Oncology)  Stefan Gutierrez MD as Consulting Physician (Pain Medicine)      Subjective   History Present Illness     Chief Complaint   Patient presents with   • Abnormal Lab       Ms. Guidry is a 83 y.o. former smoker with a history of hypertension, diabetes mellitus type 2, hyperlipidemia, obesity, C. difficile colitis that presents to [facility] complaining of abnormal lab.    Recently admitted to hospital December 24 through December 30, 2023 with diabetic foot ulcer and followed by podiatry with small I&D of wound --cultures growing MDRO Enterobacter.  Infectious disease recommend IV cefepime twice daily for 10 days after I&D and left midline placed.      Elevated white count elevated at [facility] and patient transferred to [facility] ER via EMS.  Tested positive for COVID and C. Difficile (Vancocin initiated PTA) within the past week.    Procalcitonin 17.40.    Tolerating room air and chest x-ray unremarkable.    Recommendation pending hospital course.  Details below.    History of Present Illness    Review of Systems   Constitutional:  Positive for activity change and fatigue. Negative for chills and fever.   HENT:  Negative for congestion and rhinorrhea.    Respiratory:  Positive for cough. Negative for shortness of breath.    Cardiovascular:  Negative for chest pain and leg swelling.   Gastrointestinal:  Positive for abdominal pain and diarrhea. Negative for nausea and vomiting.   Endocrine: Negative for polydipsia, polyphagia and polyuria.   Genitourinary:  Negative for difficulty  urinating and dysuria.   Musculoskeletal:  Positive for gait problem (due to generalized weakness).   Skin:  Negative for rash and wound.   Neurological:  Negative for syncope and light-headedness.   Psychiatric/Behavioral:  Negative for confusion and hallucinations.         Personal History     Past Medical History:   Diagnosis Date   • Acute metabolic encephalopathy 6/24/2022   • Anxiety    • Arthritis    • Benign essential hypertension 08/20/2014   • Bleeding disorder    • Depression    • Diabetes    • Diabetes mellitus, type 2    • Disc degeneration, lumbar    • Headache, tension-type    • Hyperlipidemia    • Hypothyroidism    • Neuropathy    • Osteoporosis 09/09/2015   • Peripheral neuropathy    • Rotator cuff tear, left    • Scoliosis    • Shoulder pain     LEFT, TORN ROTATOR CUFF S/P FALL   • Spinal stenosis      Past Surgical History:   Procedure Laterality Date   • APPENDECTOMY     • BILATERAL BREAST REDUCTION Bilateral 08/2015   • CATARACT EXTRACTION  03/2015   • CHOLECYSTECTOMY WITH INTRAOPERATIVE CHOLANGIOGRAM N/A 3/27/2022    Procedure: CHOLECYSTECTOMY LAPAROSCOPIC INTRAOPERATIVE CHOLANGIOGRAM;  Surgeon: Aiyana Hill MD;  Location: [facility] OR;  Service: General;  Laterality: N/A;   • COLONOSCOPY  06/05/2015    WNL   • ERCP N/A 2/25/2022    Procedure: ENDOSCOPIC RETROGRADE CHOLANGIOPANCREATOGRAPHY with sphincterotomy and balloon sweep;  Surgeon: Chilo Wilhelm MD;  Location: [facility] ENDOSCOPY;  Service: Gastroenterology;  Laterality: N/A;  PRE/POST - CBD stones   • ERCP N/A 3/28/2022    Procedure: ENDOSCOPIC RETROGRADE CHOLANGIOPANCREATOGRAPHY WITH SPHINCTEROTOMY AND BALLOON SWEEP;  Surgeon: Chilo Wilhelm MD;  Location: [facility] ENDOSCOPY;  Service: Gastroenterology;  Laterality: N/A;  PRE: COMMON DUCT STONE  POST: COMMON DUCT STONE   • KYPHOPLASTY     • REDUCTION MAMMAPLASTY     • TONSILLECTOMY       Family History   Problem Relation Age of Onset   • Bone cancer Mother    • No Known  Problems Father    • Heart disease Daughter      Social History     Tobacco Use   • Smoking status: Former     Packs/day: 1.00     Years: 40.00     Additional pack years: 0.00     Total pack years: 40.00     Types: Cigarettes     Quit date:      Years since quittin.0   • Smokeless tobacco: Never   Vaping Use   • Vaping Use: Never used   Substance Use Topics   • Alcohol use: No   • Drug use: No     No current facility-administered medications on file prior to encounter.     Current Outpatient Medications on File Prior to Encounter   Medication Sig Dispense Refill   • amLODIPine (NORVASC) 10 MG tablet Take 1 tablet by mouth Daily.     • atorvastatin (LIPITOR) 80 MG tablet TAKE 1 TABLET EVERY NIGHT (Patient taking differently: Take 1 tablet by mouth Every Night.) 30 tablet 0   • bisoprolol (ZEBeta) 5 MG tablet Take 1 tablet by mouth Daily. 90 tablet 1   • chlorthalidone (HYGROTON) 25 MG tablet Take 1 tablet by mouth Daily. 90 tablet 1   • dicyclomine (BENTYL) 20 MG tablet Take 1 tablet by mouth 3 (Three) Times a Day As Needed for Abdominal Cramping.     • doxazosin (CARDURA) 1 MG tablet Take 1 tablet by mouth Daily As Needed.     • DULoxetine (CYMBALTA) 30 MG capsule Take 1 capsule by mouth Daily. 90 capsule 1   • furosemide (LASIX) 20 MG tablet Take 1 tablet by mouth Daily. 30 tablet 3   • HYDROcodone-acetaminophen (NORCO)  MG per tablet Take 1 tablet by mouth 3 (Three) Times a Day.     • hydrocortisone-zinc oxide-bacitracin-nystatin cream Apply 1 application topically to the appropriate area as directed 2 (Two) Times a Day As Needed (rash). 5 g 0   • Insulin Glargine, 1 Unit Dial, (TOUJEO) 300 UNIT/ML solution pen-injector injection Inject 60 Units under the skin into the appropriate area as directed Daily. (Patient taking differently: Inject 60 Units under the skin into the appropriate area as directed Every Night.)     • insulin lispro (HUMALOG/ADMELOG) 100 UNIT/ML injection Inject 2-7 Units under  the skin into the appropriate area as directed 4 (Four) Times a Day Before Meals & at Bedtime. (Patient taking differently: Inject 2-10 Units under the skin into the appropriate area as directed 3 (Three) Times a Day Before Meals. Sliding Scale)     • lactobacillus acidophilus (RISAQUAD) capsule capsule Take 1 capsule by mouth Daily for 11 doses. 14 capsule 0   • lansoprazole (PREVACID) 30 MG capsule TAKE 1 CAPSULE DAILY 30 capsule 11   • levothyroxine (SYNTHROID, LEVOTHROID) 112 MCG tablet levothyroxine 112 mcg 1 tablet daily, except on Sunday take 1.5 tablets. (Patient taking differently: Take 1 tablet by mouth See Admin Instructions. Take everyday EXCEPT Sunday take 1.5 tab) 96 tablet 0   • levothyroxine (SYNTHROID, LEVOTHROID) 112 MCG tablet Take 1.5 tablets by mouth 1 (One) Time Per Week. Take on Sunday     • lisinopril (PRINIVIL,ZESTRIL) 40 MG tablet Take 1 tablet by mouth Daily. 90 tablet 1   • multivitamin with minerals (CertaVite Senior) tablet tablet Take 1 tablet by mouth Daily.     • pregabalin (LYRICA) 50 MG capsule Take 1 capsule by mouth 3 (Three) Times a Day. 270 capsule 1   • vancomycin (Vancocin) 125 MG capsule Take 1 capsule by mouth 4 (Four) Times a Day. Cdiff     • vitamin B-12 (VITAMIN B-12) 1000 MCG tablet Take 1 tablet by mouth Daily.     • BD Pen Needle Naila 2nd Gen 32G X 4 MM misc USE TO INJECT INSULIN FOUR TIMES DAILY 200 each 0   • Insulin Lispro, 1 Unit Dial, (HumaLOG KwikPen) 100 UNIT/ML solution pen-injector Inject 5 Units under the skin into the appropriate area as directed 3 (Three) Times a Day Before Meals. Give 20 units plus 2 units per 50 over 150 mg/dl before meals. Max daily dose of 90 units. 30 mL 0     Allergies   Allergen Reactions   • Sulfa Antibiotics Unknown - High Severity     Pt says it was a long time ago and I don't remember but it wasn't good.        Objective    Objective     Vital Signs  Temp:  [97.6 °F (36.4 °C)-98.8 °F (37.1 °C)] 97.9 °F (36.6 °C)  Heart Rate:   [61-79] 77  Resp:  [16-18] 16  BP: (104-152)/(53-93) 149/57  SpO2:  [91 %-97 %] 95 %  on   ;   Device (Oxygen Therapy): room air  Body mass index is 29.84 kg/m².    Physical Exam  Constitutional:       General: She is not in acute distress.     Appearance: She is not toxic-appearing.   HENT:      Head: Normocephalic and atraumatic.   Eyes:      Extraocular Movements: Extraocular movements intact.      Conjunctiva/sclera: Conjunctivae normal.   Cardiovascular:      Rate and Rhythm: Normal rate.      Heart sounds: Normal heart sounds.   Pulmonary:      Effort: Pulmonary effort is normal.      Breath sounds: Normal breath sounds.   Abdominal:      General: Bowel sounds are normal.      Palpations: Abdomen is soft.   Musculoskeletal:      Cervical back: Normal range of motion and neck supple.      Right lower leg: No edema.      Left lower leg: No edema.   Skin:     General: Skin is warm and dry.   Neurological:      General: No focal deficit present.      Mental Status: She is alert and oriented to person, place, and time.   Psychiatric:         Behavior: Behavior normal.         Thought Content: Thought content normal.         Results Review:  I reviewed the patient's new clinical results.  I reviewed the patient's new imaging results and agree with the interpretation.  I reviewed the patient's other test results and agree with the interpretation  I personally viewed and interpreted the patient's EKG/Telemetry data  Discussed with ED provider.    Lab Results (last 24 hours)       Procedure Component Value Units Date/Time    CBC & Differential [[id number]]  (Abnormal) Collected: 01/09/24 1758    Specimen: Blood Updated: 01/09/24 1810    Narrative:      The following orders were created for panel order CBC & Differential.  Procedure                               Abnormality         Status                     ---------                               -----------         ------                     CBC Auto  "Differential[[id number]]        Abnormal            Final result                 Please view results for these tests on the individual orders.    Comprehensive Metabolic Panel [[id number]]  (Abnormal) Collected: 01/09/24 1758    Specimen: Blood Updated: 01/09/24 1832     Glucose 212 mg/dL      BUN 57 mg/dL      Creatinine 2.03 mg/dL      Sodium 139 mmol/L      Potassium 4.0 mmol/L      Chloride 110 mmol/L      CO2 15.8 mmol/L      Calcium 8.4 mg/dL      Total Protein 6.5 g/dL      Albumin 3.1 g/dL      ALT (SGPT) 11 U/L      AST (SGOT) 24 U/L      Alkaline Phosphatase 94 U/L      Total Bilirubin 0.2 mg/dL      Globulin 3.4 gm/dL      A/G Ratio 0.9 g/dL      BUN/Creatinine Ratio 28.1     Anion Gap 13.2 mmol/L      eGFR 23.9 mL/min/1.73     Narrative:      GFR Normal >60  Chronic Kidney Disease <60  Kidney Failure <15    The GFR formula is only valid for adults with stable renal function between ages 18 and 70.    Lactic Acid, Plasma [[id number]]  (Normal) Collected: 01/09/24 1758    Specimen: Blood Updated: 01/09/24 1826     Lactate 0.9 mmol/L     Procalcitonin [[id number]]  (Abnormal) Collected: 01/09/24 1758    Specimen: Blood Updated: 01/09/24 1839     Procalcitonin 17.40 ng/mL     Narrative:      As a Marker for Sepsis (Non-Neonates):    1. <0.5 ng/mL represents a low risk of severe sepsis and/or septic shock.  2. >2 ng/mL represents a high risk of severe sepsis and/or septic shock.    As a Marker for Lower Respiratory Tract Infections that require antibiotic therapy:    PCT on Admission    Antibiotic Therapy       6-12 Hrs later    >0.5                Strongly Recommended  >0.25 - <0.5        Recommended   0.1 - 0.25          Discouraged              Remeasure/reassess PCT  <0.1                Strongly Discouraged     Remeasure/reassess PCT    As 28 day mortality risk marker: \"Change in Procalcitonin Result\" (>80% or <=80%) if Day 0 (or Day 1) and Day 4 values are available. Refer to " http://www.[facility]-pct-calculator.com    Change in PCT <=80%  A decrease of PCT levels below or equal to 80% defines a positive change in PCT test result representing a higher risk for 28-day all-cause mortality of patients diagnosed with severe sepsis for septic shock.    Change in PCT >80%  A decrease of PCT levels of more than 80% defines a negative change in PCT result representing a lower risk for 28-day all-cause mortality of patients diagnosed with severe sepsis or septic shock.       CBC Auto Differential [[id number]]  (Abnormal) Collected: 01/09/24 1758    Specimen: Blood Updated: 01/09/24 1810     WBC 19.14 10*3/mm3      RBC 4.12 10*6/mm3      Hemoglobin 11.5 g/dL      Hematocrit 33.9 %      MCV 82.3 fL      MCH 27.9 pg      MCHC 33.9 g/dL      RDW 15.3 %      RDW-SD 44.9 fl      MPV 9.6 fL      Platelets 351 10*3/mm3      Neutrophil % 82.5 %      Lymphocyte % 6.5 %      Monocyte % 6.3 %      Eosinophil % 2.4 %      Basophil % 1.0 %      Immature Grans % 1.3 %      Neutrophils, Absolute 15.79 10*3/mm3      Lymphocytes, Absolute 1.24 10*3/mm3      Monocytes, Absolute 1.21 10*3/mm3      Eosinophils, Absolute 0.45 10*3/mm3      Basophils, Absolute 0.20 10*3/mm3      Immature Grans, Absolute 0.25 10*3/mm3      nRBC 0.1 /100 WBC     Respiratory Panel PCR w/COVID-19(SARS-CoV-2) NAY/JEANIE/GAYLE/PAD/COR/YANN In-House, NP Swab in UTM/VTM, 2 HR TAT - Swab, Nasopharynx [[id number]]  (Abnormal) Collected: 01/09/24 1759    Specimen: Swab from Nasopharynx Updated: 01/09/24 1855     ADENOVIRUS, PCR Not Detected     Coronavirus 229E Not Detected     Coronavirus HKU1 Not Detected     Coronavirus NL63 Not Detected     Coronavirus OC43 Not Detected     COVID19 Detected     Human Metapneumovirus Not Detected     Human Rhinovirus/Enterovirus Not Detected     Influenza A PCR Not Detected     Influenza B PCR Not Detected     Parainfluenza Virus 1 Not Detected     Parainfluenza Virus 2 Not Detected     Parainfluenza Virus 3 Not Detected      Parainfluenza Virus 4 Not Detected     RSV, PCR Not Detected     Bordetella pertussis pcr Not Detected     Bordetella parapertussis PCR Not Detected     Chlamydophila pneumoniae PCR Not Detected     Mycoplasma pneumo by PCR Not Detected    Narrative:      In the setting of a positive respiratory panel with a viral infection PLUS a negative procalcitonin without other underlying concern for bacterial infection, consider observing off antibiotics or discontinuation of antibiotics and continue supportive care. If the respiratory panel is positive for atypical bacterial infection (Bordetella pertussis, Chlamydophila pneumoniae, or Mycoplasma pneumoniae), consider antibiotic de-escalation to target atypical bacterial infection.    Urinalysis With Microscopic If Indicated (No Culture) - Straight Cath [[id number]]  (Abnormal) Collected: 01/09/24 1811    Specimen: Urine from Straight Cath Updated: 01/09/24 1844     Color, UA Yellow     Appearance, UA Clear     pH, UA 5.5     Specific Gravity, UA 1.016     Glucose, UA Negative     Ketones, UA Negative     Bilirubin, UA Negative     Blood, UA Moderate (2+)     Protein,  mg/dL (2+)     Leuk Esterase, UA Negative     Nitrite, UA Negative     Urobilinogen, UA 0.2 E.U./dL    Urinalysis, Microscopic Only - Straight Cath [[id number]]  (Abnormal) Collected: 01/09/24 1811    Specimen: Urine from Straight Cath Updated: 01/09/24 1844     RBC, UA 3-5 /HPF      WBC, UA 3-5 /HPF      Bacteria, UA None Seen /HPF      Squamous Epithelial Cells, UA 0-2 /HPF      Hyaline Casts, UA 0-2 /LPF      Methodology Automated Microscopy    Blood Culture - Blood, Arm, Right [[id number]] Collected: 01/09/24 1816    Specimen: Blood from Arm, Right Updated: 01/09/24 1824    Blood Culture - Blood, Arm, Right [[id number]] Collected: 01/09/24 1928    Specimen: Blood from Arm, Right Updated: 01/09/24 1931    POC Glucose Once [[id number]]  (Abnormal) Collected: 01/09/24 2109    Specimen: Blood Updated:  01/09/24 2111     Glucose 191 mg/dL     POC Glucose Once [[id number]]  (Abnormal) Collected: 01/10/24 0025    Specimen: Blood Updated: 01/10/24 0026     Glucose 184 mg/dL     POC Glucose Once [[id number]]  (Abnormal) Collected: 01/10/24 0539    Specimen: Blood Updated: 01/10/24 0541     Glucose 145 mg/dL     Comprehensive Metabolic Panel [[id number]]  (Abnormal) Collected: 01/10/24 0854    Specimen: Blood Updated: 01/10/24 1049     Glucose 146 mg/dL      BUN 41 mg/dL      Creatinine 1.45 mg/dL      Sodium 142 mmol/L      Potassium 3.9 mmol/L      Chloride 112 mmol/L      CO2 14.3 mmol/L      Calcium 8.4 mg/dL      Total Protein 6.1 g/dL      Albumin 2.9 g/dL      ALT (SGPT) 11 U/L      AST (SGOT) 22 U/L      Alkaline Phosphatase 95 U/L      Total Bilirubin 0.2 mg/dL      Globulin 3.2 gm/dL      A/G Ratio 0.9 g/dL      BUN/Creatinine Ratio 28.3     Anion Gap 15.7 mmol/L      eGFR 35.9 mL/min/1.73     Narrative:      GFR Normal >60  Chronic Kidney Disease <60  Kidney Failure <15    The GFR formula is only valid for adults with stable renal function between ages 18 and 70.    CBC & Differential [[id number]]  (Abnormal) Collected: 01/10/24 0854    Specimen: Blood Updated: 01/10/24 0952    Narrative:      The following orders were created for panel order CBC & Differential.  Procedure                               Abnormality         Status                     ---------                               -----------         ------                     CBC Auto Differential[[id number]]        Abnormal            Final result                 Please view results for these tests on the individual orders.    Magnesium [[id number]]  (Normal) Collected: 01/10/24 0854    Specimen: Blood Updated: 01/10/24 1049     Magnesium 2.0 mg/dL     Procalcitonin [[id number]]  (Abnormal) Collected: 01/10/24 0854    Specimen: Blood Updated: 01/10/24 1056     Procalcitonin 9.71 ng/mL     Narrative:      As a Marker for Sepsis (Non-Neonates):    1. <0.5  "ng/mL represents a low risk of severe sepsis and/or septic shock.  2. >2 ng/mL represents a high risk of severe sepsis and/or septic shock.    As a Marker for Lower Respiratory Tract Infections that require antibiotic therapy:    PCT on Admission    Antibiotic Therapy       6-12 Hrs later    >0.5                Strongly Recommended  >0.25 - <0.5        Recommended   0.1 - 0.25          Discouraged              Remeasure/reassess PCT  <0.1                Strongly Discouraged     Remeasure/reassess PCT    As 28 day mortality risk marker: \"Change in Procalcitonin Result\" (>80% or <=80%) if Day 0 (or Day 1) and Day 4 values are available. Refer to http://www.[facility] [place]-pct-calculator.com    Change in PCT <=80%  A decrease of PCT levels below or equal to 80% defines a positive change in PCT test result representing a higher risk for 28-day all-cause mortality of patients diagnosed with severe sepsis for septic shock.    Change in PCT >80%  A decrease of PCT levels of more than 80% defines a negative change in PCT result representing a lower risk for 28-day all-cause mortality of patients diagnosed with severe sepsis or septic shock.       CBC Auto Differential [[id number]]  (Abnormal) Collected: 01/10/24 0854    Specimen: Blood Updated: 01/10/24 0952     WBC 16.06 10*3/mm3      RBC 4.08 10*6/mm3      Hemoglobin 10.5 g/dL      Hematocrit 33.0 %      MCV 80.9 fL      MCH 25.7 pg      MCHC 31.8 g/dL      RDW 15.0 %      RDW-SD 43.6 fl      MPV 9.4 fL      Platelets 350 10*3/mm3      Neutrophil % 81.2 %      Lymphocyte % 7.8 %      Monocyte % 6.1 %      Eosinophil % 2.5 %      Basophil % 0.8 %      Immature Grans % 1.6 %      Neutrophils, Absolute 13.05 10*3/mm3      Lymphocytes, Absolute 1.25 10*3/mm3      Monocytes, Absolute 0.98 10*3/mm3      Eosinophils, Absolute 0.40 10*3/mm3      Basophils, Absolute 0.13 10*3/mm3      Immature Grans, Absolute 0.25 10*3/mm3      nRBC 0.1 /100 WBC     POC Glucose Once [[id number]]  (Abnormal) " Collected: 01/10/24 1137    Specimen: Blood Updated: 01/10/24 1138     Glucose 211 mg/dL             Imaging Results (Last 24 Hours)       Procedure Component Value Units Date/Time    XR Chest 1 View [[id number]] Collected: 01/09/24 1818     Updated: 01/09/24 1829    Narrative:      XR CHEST 1 VW-1/9/2024     HISTORY: Leukocytosis.     Heart size is within normal limits. Lungs appear free of acute  infiltrates. There is some aortic calcification. There are degenerative  changes of the shoulders left greater than right.       Impression:      1. No acute process.        This report was finalized on 1/9/2024 6:26 PM by Dr. Mario Alberto Moura M.D on Workstation: [id number]               Results for orders placed during the hospital encounter of 06/22/22    Adult transthoracic echo complete    Interpretation Summary  · Left ventricular wall thickness is consistent with mild concentric hypertrophy.  · Calculated left ventricular EF = 68% Estimated left ventricular EF was in agreement with the calculated left ventricular EF. Left ventricular systolic function is normal.  · Normal right ventricular cavity size and systolic function noted.  · The left atrial cavity is mildly dilated  · Saline test results are negative.  · Mild mitral valve regurgitation is present.  · There is no evidence of pericardial effusion      SCANNED - TELEMETRY     Final Result      SCANNED - TELEMETRY     Final Result      SCANNED - TELEMETRY     Final Result           Assessment/Plan     Active Hospital Problems    Diagnosis  POA   • **VIPUL (acute kidney injury) [N17.9]  Yes   • Diabetic foot ulcer [E11.621, L97.509]  Yes   • Primary malignant neuroendocrine tumor of pancreas [C7A.8]  Yes   • Anxiety disorder [F41.9]  Yes   • COVID-19 virus detected [U07.1]  Yes   • Hyperlipidemia [E78.5]  Yes   • Primary hypothyroidism [E03.9]  Yes   • Type 2 diabetes mellitus with hyperglycemia, with long-term current use of insulin [E11.65, Z79.4]  Not Applicable    • Benign essential hypertension [I10]  Yes   • Stage 3a chronic kidney disease [N18.31]  Yes      Resolved Hospital Problems   No resolved problems to display.       Ms. Guidry is a 83 y.o. former smoker with a history of hypertension, diabetes mellitus type 2, hyperlipidemia, obesity, C. difficile colitis that presents to [facility] complaining of abnormal lab.      VIPUL (acute kidney injury) over CKD stage IIIa:  Baseline creatinine 1.1 increased to 2.0 and improving with trend following IV fluid hydration (reduce rate to avoid fluid volume overload) and diuretics on hold.  Most likely due to GI loss from complications of suspected C. difficile colitis diagnosed in OP setting.  Avoid nephrotoxins.  Repeat labs in AM.        COVID-19 virus detected: Tolerating room air with oxygen saturation 95%.  Remdesivir provided for now.  No current need for dexamethasone.      Type 2 diabetes mellitus with hyperglycemia, with long-term current use of insulin:  Glucose trends greater than optimal.  Plan at order Lantus 30 units subcu twice daily and moderate dose correctional sliding scale for now.  NCS diet.  Monitor dietary tolerance and glucose trends.  Adjust insulin currently.      History of C. Diff with new onset diarrhea at facility.  Vancocin initiated around 1/3/2024.  Infectious disease following recommend vancomycin 125 mg p.o. every 6 hours x 10 days with stop date on 1/15/2024.  BC x 2 pending results.      Hyperlipidemia: LFTs unremarkable.  Statin continued.      Benign essential hypertension: BP acceptable acutely.  Plan continue amlodipine with hold parameters.      Primary hypothyroidism: Levothyroxine continue per home dose regimen.      Primary malignant neuroendocrine tumor of pancreas: Follows with oncology as outpatient and receiving monthly octreotide injections.      Diabetic foot ulcer: Status post IV cefepime completed on 1/3/2024.        Anxiety disorder: Stable on exam.      I  discussed the patient's findings and my recommendations with patient, RN, & Dr. Lobo.     VTE Prophylaxis - SCDs.  Code Status - Full code.       SERGE Fernando  [facility]  01/10/24  13:02 EST

## 2024-01-11 LAB
ALBUMIN SERPL-MCNC: 3.3 G/DL (ref 3.5–5.2)
ALBUMIN/GLOB SERPL: 1.1 G/DL
ALP SERPL-CCNC: 93 U/L (ref 39–117)
ALT SERPL W P-5'-P-CCNC: 11 U/L (ref 1–33)
ANION GAP SERPL CALCULATED.3IONS-SCNC: 13 MMOL/L (ref 5–15)
AST SERPL-CCNC: 22 U/L (ref 1–32)
BILIRUB SERPL-MCNC: 0.2 MG/DL (ref 0–1.2)
BUN SERPL-MCNC: 31 MG/DL (ref 8–23)
BUN/CREAT SERPL: 22.1 (ref 7–25)
CALCIUM SPEC-SCNC: 8.9 MG/DL (ref 8.6–10.5)
CHLORIDE SERPL-SCNC: 112 MMOL/L (ref 98–107)
CO2 SERPL-SCNC: 19 MMOL/L (ref 22–29)
CREAT SERPL-MCNC: 1.4 MG/DL (ref 0.57–1)
CRP SERPL-MCNC: 4.17 MG/DL (ref 0–0.5)
DEPRECATED RDW RBC AUTO: 46.1 FL (ref 37–54)
EGFRCR SERPLBLD CKD-EPI 2021: 37.4 ML/MIN/1.73
ERYTHROCYTE [DISTWIDTH] IN BLOOD BY AUTOMATED COUNT: 15.6 % (ref 12.3–15.4)
FERRITIN SERPL-MCNC: 141 NG/ML (ref 13–150)
GLOBULIN UR ELPH-MCNC: 3.1 GM/DL
GLUCOSE BLDC GLUCOMTR-MCNC: 103 MG/DL (ref 70–130)
GLUCOSE BLDC GLUCOMTR-MCNC: 111 MG/DL (ref 70–130)
GLUCOSE BLDC GLUCOMTR-MCNC: 132 MG/DL (ref 70–130)
GLUCOSE BLDC GLUCOMTR-MCNC: 154 MG/DL (ref 70–130)
GLUCOSE SERPL-MCNC: 113 MG/DL (ref 65–99)
HCT VFR BLD AUTO: 35 % (ref 34–46.6)
HGB BLD-MCNC: 11.8 G/DL (ref 12–15.9)
MAGNESIUM SERPL-MCNC: 1.6 MG/DL (ref 1.6–2.4)
MCH RBC QN AUTO: 27.6 PG (ref 26.6–33)
MCHC RBC AUTO-ENTMCNC: 33.7 G/DL (ref 31.5–35.7)
MCV RBC AUTO: 81.8 FL (ref 79–97)
PLATELET # BLD AUTO: 324 10*3/MM3 (ref 140–450)
PMV BLD AUTO: 9.5 FL (ref 6–12)
POTASSIUM SERPL-SCNC: 3.9 MMOL/L (ref 3.5–5.2)
PROT SERPL-MCNC: 6.4 G/DL (ref 6–8.5)
RBC # BLD AUTO: 4.28 10*6/MM3 (ref 3.77–5.28)
SODIUM SERPL-SCNC: 144 MMOL/L (ref 136–145)
WBC NRBC COR # BLD AUTO: 18.08 10*3/MM3 (ref 3.4–10.8)

## 2024-01-11 PROCEDURE — 25810000003 SODIUM CHLORIDE 0.9 % SOLUTION 250 ML FLEX CONT: Performed by: HOSPITALIST

## 2024-01-11 PROCEDURE — 80053 COMPREHEN METABOLIC PANEL: CPT | Performed by: INTERNAL MEDICINE

## 2024-01-11 PROCEDURE — 25010000002 REMDESIVIR 100 MG/20ML SOLUTION 1 EACH VIAL: Performed by: HOSPITALIST

## 2024-01-11 PROCEDURE — 99232 SBSQ HOSP IP/OBS MODERATE 35: CPT | Performed by: INTERNAL MEDICINE

## 2024-01-11 PROCEDURE — 82948 REAGENT STRIP/BLOOD GLUCOSE: CPT

## 2024-01-11 PROCEDURE — 25810000003 LACTATED RINGERS PER 1000 ML: Performed by: NURSE PRACTITIONER

## 2024-01-11 PROCEDURE — 63710000001 INSULIN GLARGINE PER 5 UNITS: Performed by: NURSE PRACTITIONER

## 2024-01-11 PROCEDURE — 83735 ASSAY OF MAGNESIUM: CPT | Performed by: HOSPITALIST

## 2024-01-11 PROCEDURE — 63710000001 INSULIN LISPRO (HUMAN) PER 5 UNITS: Performed by: NURSE PRACTITIONER

## 2024-01-11 PROCEDURE — 85027 COMPLETE CBC AUTOMATED: CPT | Performed by: INTERNAL MEDICINE

## 2024-01-11 PROCEDURE — 82728 ASSAY OF FERRITIN: CPT | Performed by: HOSPITALIST

## 2024-01-11 PROCEDURE — 86140 C-REACTIVE PROTEIN: CPT | Performed by: HOSPITALIST

## 2024-01-11 RX ORDER — UREA 10 %
3 LOTION (ML) TOPICAL NIGHTLY
Status: DISCONTINUED | OUTPATIENT
Start: 2024-01-11 | End: 2024-01-15 | Stop reason: HOSPADM

## 2024-01-11 RX ADMIN — PREGABALIN 50 MG: 25 CAPSULE ORAL at 20:55

## 2024-01-11 RX ADMIN — DULOXETINE HYDROCHLORIDE 30 MG: 30 CAPSULE, DELAYED RELEASE ORAL at 08:57

## 2024-01-11 RX ADMIN — PREGABALIN 50 MG: 25 CAPSULE ORAL at 16:50

## 2024-01-11 RX ADMIN — AMLODIPINE BESYLATE 10 MG: 10 TABLET ORAL at 08:57

## 2024-01-11 RX ADMIN — LEVOTHYROXINE SODIUM 112 MCG: 112 TABLET ORAL at 05:59

## 2024-01-11 RX ADMIN — Medication 3 MG: at 20:55

## 2024-01-11 RX ADMIN — INSULIN LISPRO 2 UNITS: 100 INJECTION, SOLUTION INTRAVENOUS; SUBCUTANEOUS at 17:13

## 2024-01-11 RX ADMIN — INSULIN GLARGINE 20 UNITS: 100 INJECTION, SOLUTION SUBCUTANEOUS at 08:57

## 2024-01-11 RX ADMIN — SODIUM CHLORIDE, POTASSIUM CHLORIDE, SODIUM LACTATE AND CALCIUM CHLORIDE 50 ML/HR: 600; 310; 30; 20 INJECTION, SOLUTION INTRAVENOUS at 06:13

## 2024-01-11 RX ADMIN — ATORVASTATIN CALCIUM 80 MG: 80 TABLET, FILM COATED ORAL at 20:55

## 2024-01-11 RX ADMIN — REMDESIVIR 100 MG: 100 INJECTION, POWDER, LYOPHILIZED, FOR SOLUTION INTRAVENOUS at 01:39

## 2024-01-11 RX ADMIN — VANCOMYCIN HYDROCHLORIDE 125 MG: 125 CAPSULE ORAL at 17:13

## 2024-01-11 RX ADMIN — CASTOR OIL AND BALSAM, PERU 1 APPLICATION: 788; 87 OINTMENT TOPICAL at 08:58

## 2024-01-11 RX ADMIN — INSULIN GLARGINE 20 UNITS: 100 INJECTION, SOLUTION SUBCUTANEOUS at 20:55

## 2024-01-11 RX ADMIN — VANCOMYCIN HYDROCHLORIDE 125 MG: 125 CAPSULE ORAL at 05:59

## 2024-01-11 RX ADMIN — PREGABALIN 50 MG: 25 CAPSULE ORAL at 08:57

## 2024-01-11 RX ADMIN — VANCOMYCIN HYDROCHLORIDE 125 MG: 125 CAPSULE ORAL at 12:43

## 2024-01-11 RX ADMIN — BENZOCAINE: 0.1 GEL TOPICAL at 17:12

## 2024-01-11 NOTE — PROGRESS NOTES
ID NOTE    CC: f/u COViD, CDiff, recent foot infection     Subj: No fever.  She remains on room air.  She denies shortness of breath and reports only minimal cough.  She says she has had a few small loose bowel movements.  She denies abdominal pain.    Medications:    Current Facility-Administered Medications:     acetaminophen (TYLENOL) tablet 650 mg, 650 mg, Oral, Q6H PRN, Beena Morgan APRN, 650 mg at 01/10/24 2354    aluminum-magnesium hydroxide-simethicone (MAALOX MAX) 400-400-40 MG/5ML suspension 15 mL, 15 mL, Oral, Q6H PRN, Ceci Ram APRN    amLODIPine (NORVASC) tablet 10 mg, 10 mg, Oral, Daily, Peter Sanz APRN, 10 mg at 01/11/24 0857    atorvastatin (LIPITOR) tablet 80 mg, 80 mg, Oral, Nightly, Peter Sanz APRN, 80 mg at 01/10/24 2113    cadexomer iodine (IODOSORB) 0.9 % gel 1 application , 1 application , Topical, Daily PRN, Ulisses Lobo MD, 1 application  at 01/10/24 1814    castor oil-balsam peru (VENELEX) ointment 1 application , 1 application , Topical, Daily, Ulisses Lobo MD, 1 application  at 01/11/24 0858    dextrose (D50W) (25 g/50 mL) IV injection 25 g, 25 g, Intravenous, Q15 Min PRN, Peter Sanz APRN    dextrose (GLUTOSE) oral gel 15 g, 15 g, Oral, Q15 Min PRN, Peter Sanz APRN    DULoxetine (CYMBALTA) DR capsule 30 mg, 30 mg, Oral, Daily, Peter Sanz APRN, 30 mg at 01/11/24 0857    glucagon (GLUCAGEN) injection 1 mg, 1 mg, Intramuscular, Q15 Min PRN, Peter Sanz APRN    insulin glargine (LANTUS, SEMGLEE) injection 20 Units, 20 Units, Subcutaneous, BID, Peter Sanz APRN, 20 Units at 01/11/24 0857    insulin lispro (HUMALOG/ADMELOG) injection 2-7 Units, 2-7 Units, Subcutaneous, 4x Daily AC & at Bedtime, Peter Sanz APRN, 3 Units at 01/10/24 2114    lactated ringers infusion, 50 mL/hr, Intravenous, Continuous, Peter Sanz APRN, Last Rate: 50 mL/hr at 01/11/24 0613, 50 mL/hr at 01/11/24 0613    levothyroxine (SYNTHROID,  LEVOTHROID) tablet 112 mcg, 112 mcg, Oral, Once per day on Monday Tuesday Wednesday Thursday Friday Saturday, Peter Sanz APRN, 112 mcg at 01/11/24 0559    [START ON 1/14/2024] levothyroxine (SYNTHROID, LEVOTHROID) tablet 168 mcg, 168 mcg, Oral, Every Sunday, Peter Sanz APRN    nitroglycerin (NITROSTAT) SL tablet 0.4 mg, 0.4 mg, Sublingual, Q5 Min PRN, Ceci Ram APRN    ondansetron ODT (ZOFRAN-ODT) disintegrating tablet 4 mg, 4 mg, Oral, Q6H PRN **OR** ondansetron (ZOFRAN) injection 4 mg, 4 mg, Intravenous, Q6H PRN, Ceci Ram APRN, 4 mg at 01/10/24 1531    Pharmacy Consult - Remdesivir for Severe COVID-19 (Within 7 days of symptom onset), , Does not apply, Continuous PRN, Ceci Ram APRN    Pharmacy Consult, , Does not apply, Continuous PRN, Lei Rodriguez MD    pregabalin (LYRICA) capsule 50 mg, 50 mg, Oral, TID, Peter Sanz APRN, 50 mg at 01/11/24 0857    [COMPLETED] remdesivir 200 mg in sodium chloride 0.9 % 290 mL IVPB (powder vial), 200 mg, Intravenous, Q24H, Stopped at 01/10/24 0355 **FOLLOWED BY** remdesivir 100 mg in sodium chloride 0.9 % 250 mL IVPB (powder vial), 100 mg, Intravenous, Q24H, Vahe Ruiz MD, 100 mg at 01/11/24 0139    sodium chloride 0.9 % flush 10 mL, 10 mL, Intravenous, PRN, Ceci Ram APRN    vancomycin (VANCOCIN) capsule 125 mg, 125 mg, Oral, Q6H, Lei Rodriguez MD, 125 mg at 01/11/24 0559      Objective   Vital Signs   Temp:  [97.2 °F (36.2 °C)-98.6 °F (37 °C)] 97.2 °F (36.2 °C)  Heart Rate:  [81-94] 81  Resp:  [16-18] 16  BP: (144-162)/(54-82) 162/82    Physical Exam:   General: awake, alert, in bed, very nice  Eyes: no scleral icterus  Cardiovascular: NR  Respiratory: normal work of breathing on room air  GI: Abdomen is soft, not tender or distended  : External urinary catheter  MSK: Foot wrapped and bandaged    Labs:   CBC, CMP, and blood cultures reviewed today  Lab Results   Component  Value Date    WBC 18.08 (H) 01/11/2024    HGB 11.8 (L) 01/11/2024    HCT 35.0 01/11/2024    MCV 81.8 01/11/2024     01/11/2024       Lab Results   Component Value Date    GLUCOSE 113 (H) 01/11/2024    BUN 31 (H) 01/11/2024    CREATININE 1.40 (H) 01/11/2024    EGFRIFNONA 51 (L) 02/28/2022    EGFRIFAFRI 50 (L) 01/18/2021    BCR 22.1 01/11/2024    CO2 19.0 (L) 01/11/2024    CALCIUM 8.9 01/11/2024    PROTENTOTREF 7.2 06/02/2023    ALBUMIN 3.3 (L) 01/11/2024    LABIL2 1.3 06/02/2023    AST 22 01/11/2024    ALT 11 01/11/2024     Procal 17    Microbiology:  1/9/2024 RPP: + COVID-19  1/9/2024 BCx: Negative to date    Prior radiology:  CXR negative for pneumonia      ASSESSMENT/PLAN:  COVID-19 infection  Probable C. difficile diarrhea  Recent diabetic left foot ulcer with infection due to MSSA and Enterobacter  Elevated procalcitonin  Leukocytosis  Acute kidney injury    For COVID-19, I recommend that we continue remdesivir for the 5-day course.  She received dose #2 early this morning.  That being said, since she is on room air if she continues to improve quickly then it could be truncated to a 3-day course.  Since she is on room air I do not recommend dexamethasone at this time.    Continue outpatient C. difficile treatment with vancomycin 125 mg p.o. every 6 hours x 10 days.  I will set the stop date for 1/15/2024.  C. difficile could be the cause of her elevated procalcitonin.  I suspect acute kidney injury is also contributing.  Since her chest x-ray does not show any evidence of pneumonia, I do not see any reason to start her on broad-spectrum antibacterials especially since this could exacerbate her C. difficile.    I will follow-up her pending blood cultures which are currently negative to date.  Repeat CBC and BMP in the a.m. for monitoring.  I will plan to repeat a procalcitonin tomorrow for trend.    ID will follow.     Addendum: Procalcitonin has decreased to 9.

## 2024-01-11 NOTE — PLAN OF CARE
Goal Outcome Evaluation:  Plan of Care Reviewed With: patient        Progress: improving  Outcome Evaluation: pt is A&O x3 today. only complaint it sore mouth, medication provided. heel boots on. q2 turns. purewick in place. V/S WNL, will cont to monitor.

## 2024-01-11 NOTE — PLAN OF CARE
Goal Outcome Evaluation:  Plan of Care Reviewed With: patient        Progress: no change  Outcome Evaluation: pt is axox2-3, disoriented to place and situation at times. hallucinating at times. CT head completed. tylenol given for leg pain. heel boots on. q2 turns. purewick in place. iv remdesivir given. VSS. room air.

## 2024-01-11 NOTE — NURSING NOTE
Pt has seemed more confused tonight based on [facility] Pt has been hallucinating as well. Notified SERGE Weaver. CT head without contrast ordered.

## 2024-01-11 NOTE — PROGRESS NOTES
Name: Halie Guidry ADMIT: 2024   : 1940  PCP: Napoleon Mead MD    MRN: [id number] LOS: 1 days   AGE/SEX: 83 y.o. female  ROOM: [facility]     Subjective   Subjective   Patient appears comfortable and in no apparent distress.  Mostly tolerating intake; however, complains of gum pain and requesting Orajel.  Less diarrhea today.  Poor sleep overnight.  Discussed with RN.       Objective   Objective   Vital Signs  Temp:  [97.2 °F (36.2 °C)-98.6 °F (37 °C)] 97.2 °F (36.2 °C)  Heart Rate:  [81-94] 81  Resp:  [16-18] 16  BP: (144-169)/(54-82) 169/67  SpO2:  [91 %-95 %] 95 %  on   ;   Device (Oxygen Therapy): room air  Body mass index is 29.84 kg/m².    Physical Exam  Constitutional:       General: She is not in acute distress.     Appearance: She is ill-appearing. She is not toxic-appearing.      Comments: Generally weak & lethargic    Cardiovascular:      Rate and Rhythm: Normal rate.      Heart sounds: Normal heart sounds.   Pulmonary:      Effort: Pulmonary effort is normal.      Breath sounds: Normal breath sounds.   Abdominal:      General: Bowel sounds are normal.      Palpations: Abdomen is soft.   Musculoskeletal:      Right lower leg: No edema.      Left lower leg: No edema.   Skin:     General: Skin is warm and dry.     *Physical exam performed on 2024 as per above.    Results Review     I reviewed the patient's new clinical results.  Results from last 7 days   Lab Units 24  0619 01/10/24  0854 24  1758   WBC 10*3/mm3 18.08* 16.06* 19.14*   HEMOGLOBIN g/dL 11.8* 10.5* 11.5*   PLATELETS 10*3/mm3 324 350 351     Results from last 7 days   Lab Units 24  0619 01/10/24  0854 24  1758   SODIUM mmol/L 144 142 139   POTASSIUM mmol/L 3.9 3.9 4.0   CHLORIDE mmol/L 112* 112* 110*   CO2 mmol/L 19.0* 14.3* 15.8*   BUN mg/dL 31* 41* 57*   CREATININE mg/dL 1.40* 1.45* 2.03*   GLUCOSE mg/dL 113* 146* 212*   EGFR mL/min/1.73 37.4* 35.9* 23.9*     Results from last 7 days   Lab  Units 01/11/24  0619 01/10/24  0854 01/09/24  1758   ALBUMIN g/dL 3.3* 2.9* 3.1*   BILIRUBIN mg/dL 0.2 0.2 0.2   ALK PHOS U/L 93 95 94   AST (SGOT) U/L 22 22 24   ALT (SGPT) U/L 11 11 11     Results from last 7 days   Lab Units 01/11/24  0619 01/10/24  0854 01/09/24  1758   CALCIUM mg/dL 8.9 8.4* 8.4*   ALBUMIN g/dL 3.3* 2.9* 3.1*   MAGNESIUM mg/dL 1.6 2.0  --      Results from last 7 days   Lab Units 01/10/24  0854 01/09/24  1758   PROCALCITONIN ng/mL 9.71* 17.40*   LACTATE mmol/L  --  0.9     Glucose   Date/Time Value Ref Range Status   01/11/2024 1147 132 (H) 70 - 130 mg/dL Final   01/11/2024 0521 103 70 - 130 mg/dL Final   01/10/2024 2017 230 (H) 70 - 130 mg/dL Final   01/10/2024 1608 111 70 - 130 mg/dL Final   01/10/2024 1137 211 (H) 70 - 130 mg/dL Final   01/10/2024 0539 145 (H) 70 - 130 mg/dL Final   01/10/2024 0025 184 (H) 70 - 130 mg/dL Final       CT Head Without Contrast    Result Date: 1/11/2024  No acute intracranial findings.  Radiation dose reduction techniques were utilized, including automated exposure control and exposure modulation based on body size.   This report was finalized on 1/11/2024 12:45 AM by Dr. Kristina Forman M.D on Workstation: [id number]      XR Chest 1 View    Result Date: 1/9/2024  1. No acute process.   This report was finalized on 1/9/2024 6:26 PM by Dr. Mario Alberto Moura M.D on Workstation: [id number]       I have personally reviewed all medications:  Scheduled Medications  amLODIPine, 10 mg, Oral, Daily  atorvastatin, 80 mg, Oral, Nightly  castor oil-balsam peru, 1 application , Topical, Daily  DULoxetine, 30 mg, Oral, Daily  insulin glargine, 20 Units, Subcutaneous, BID  insulin lispro, 2-7 Units, Subcutaneous, 4x Daily AC & at Bedtime  levothyroxine, 112 mcg, Oral, Once per day on Monday Tuesday Wednesday Thursday Friday Saturday  [START ON 1/14/2024] levothyroxine, 168 mcg, Oral, Every Sunday  pregabalin, 50 mg, Oral, TID  remdesivir, 100 mg, Intravenous,  Q24H  vancomycin, 125 mg, Oral, Q6H    Infusions  lactated ringers, 50 mL/hr, Last Rate: 50 mL/hr (01/11/24 0613)  Pharmacy Consult - Remdesivir,   Pharmacy Consult,     Diet  Diet: Cardiac Diets, Diabetic Diets; Healthy Heart (2-3 Na+); Consistent Carbohydrate; Texture: Regular Texture (IDDSI 7); Fluid Consistency: Thin (IDDSI 0)    I have personally reviewed:  [x]  Laboratory   [x]  Microbiology   [x]  Radiology   [x]  EKG/Telemetry  []  Cardiology/Vascular   []  Pathology    []  Records       Assessment/Plan     Active Hospital Problems    Diagnosis  POA    **VIPUL (acute kidney injury) [N17.9]  Yes    COVID-19 [U07.1]  Yes    Diabetic foot ulcer [E11.621, L97.509]  Yes    Primary malignant neuroendocrine tumor of pancreas [C7A.8]  Yes    Anxiety disorder [F41.9]  Yes    COVID-19 virus detected [U07.1]  Yes    Hyperlipidemia [E78.5]  Yes    Primary hypothyroidism [E03.9]  Yes    Type 2 diabetes mellitus with hyperglycemia, with long-term current use of insulin [E11.65, Z79.4]  Not Applicable    Benign essential hypertension [I10]  Yes    Stage 3a chronic kidney disease [N18.31]  Yes      Resolved Hospital Problems   No resolved problems to display.       Ms. Guidry is a 83 y.o. former smoker with a history of hypertension, diabetes mellitus type 2, hyperlipidemia, obesity, C. difficile colitis that presents to [facility] complaining of abnormal lab.    1/11/2024:  Today requesting sleep aid for tonight.       VIPUL (acute kidney injury) over CKD stage IIIa with metabolic acidosis:  Baseline creatinine 1.1 increased to 2.0.  Improving with trend following IV fluid hydration (reduce rate to avoid fluid volume overload) and diuretics on hold.  Most likely due to GI loss from complications of suspected C. difficile colitis diagnosed in OP setting.  Avoid nephrotoxins.  Repeat labs in AM.       COVID-19 virus detected: Tolerating room air with oxygen saturation 95%.  5 days course Remdesivir planned.  No  current need for dexamethasone.       Type 2 diabetes mellitus with hyperglycemia, with long-term current use of insulin:  Glucose trends improving.  Continue Lantus 20 units subcu twice daily and low dose correctional sliding scale for now.  NCS diet.  Monitor dietary tolerance (rather poor intake today) and glucose trends.  Adjust insulin currently.       History of C. Diff with new onset diarrhea at facility.  Vancocin initiated around 1/3/2024.  Infectious disease following recommend vancomycin 125 mg p.o. every 6 hours x 10 days with stop date on 1/15/2024.  BC x 2 NGTD.  Procalcitonin much improved, repeat in a.m.       Hyperlipidemia: LFTs unremarkable.  Statin continued.       Benign essential hypertension: BP acceptable acutely.  Continue amlodipine with hold parameters.       Primary hypothyroidism: Levothyroxine continue per home dose regimen.       Primary malignant neuroendocrine tumor of pancreas: Follows with oncology as outpatient and receiving monthly octreotide injections.       Diabetic foot ulcer: Status post IV cefepime completed on 1/3/2024.      Anxiety:  Stable on exam.     SCDs for DVT prophylaxis.  Full code.  Discussed with patient and nursing staff.  Anticipate discharge to SNU facility timing yet to be determined.      SERGE Fernando  [facility]  01/11/24  15:54 EST

## 2024-01-12 ENCOUNTER — TELEPHONE (OUTPATIENT)
Dept: ONCOLOGY | Facility: CLINIC | Age: 84
End: 2024-01-12
Payer: MEDICARE

## 2024-01-12 LAB
ALBUMIN SERPL-MCNC: 2.8 G/DL (ref 3.5–5.2)
ALBUMIN/GLOB SERPL: 0.9 G/DL
ALP SERPL-CCNC: 82 U/L (ref 39–117)
ALT SERPL W P-5'-P-CCNC: 10 U/L (ref 1–33)
ANION GAP SERPL CALCULATED.3IONS-SCNC: 11 MMOL/L (ref 5–15)
AST SERPL-CCNC: 16 U/L (ref 1–32)
BILIRUB SERPL-MCNC: 0.2 MG/DL (ref 0–1.2)
BUN SERPL-MCNC: 24 MG/DL (ref 8–23)
BUN/CREAT SERPL: 20.5 (ref 7–25)
CALCIUM SPEC-SCNC: 8.8 MG/DL (ref 8.6–10.5)
CHLORIDE SERPL-SCNC: 112 MMOL/L (ref 98–107)
CO2 SERPL-SCNC: 20 MMOL/L (ref 22–29)
CREAT SERPL-MCNC: 1.17 MG/DL (ref 0.57–1)
DEPRECATED RDW RBC AUTO: 42.9 FL (ref 37–54)
EGFRCR SERPLBLD CKD-EPI 2021: 46.4 ML/MIN/1.73
ERYTHROCYTE [DISTWIDTH] IN BLOOD BY AUTOMATED COUNT: 15.1 % (ref 12.3–15.4)
GLOBULIN UR ELPH-MCNC: 3.1 GM/DL
GLUCOSE BLDC GLUCOMTR-MCNC: 110 MG/DL (ref 70–130)
GLUCOSE BLDC GLUCOMTR-MCNC: 217 MG/DL (ref 70–130)
GLUCOSE BLDC GLUCOMTR-MCNC: 217 MG/DL (ref 70–130)
GLUCOSE BLDC GLUCOMTR-MCNC: 260 MG/DL (ref 70–130)
GLUCOSE BLDC GLUCOMTR-MCNC: 59 MG/DL (ref 70–130)
GLUCOSE BLDC GLUCOMTR-MCNC: 59 MG/DL (ref 70–130)
GLUCOSE SERPL-MCNC: 212 MG/DL (ref 65–99)
HCT VFR BLD AUTO: 33.4 % (ref 34–46.6)
HGB BLD-MCNC: 10.9 G/DL (ref 12–15.9)
MCH RBC QN AUTO: 26.3 PG (ref 26.6–33)
MCHC RBC AUTO-ENTMCNC: 32.6 G/DL (ref 31.5–35.7)
MCV RBC AUTO: 80.5 FL (ref 79–97)
PLATELET # BLD AUTO: 319 10*3/MM3 (ref 140–450)
PMV BLD AUTO: 9.3 FL (ref 6–12)
POTASSIUM SERPL-SCNC: 3.4 MMOL/L (ref 3.5–5.2)
PROCALCITONIN SERPL-MCNC: 2.2 NG/ML (ref 0–0.25)
PROT SERPL-MCNC: 5.9 G/DL (ref 6–8.5)
RBC # BLD AUTO: 4.15 10*6/MM3 (ref 3.77–5.28)
SODIUM SERPL-SCNC: 143 MMOL/L (ref 136–145)
WBC NRBC COR # BLD AUTO: 15.88 10*3/MM3 (ref 3.4–10.8)

## 2024-01-12 PROCEDURE — 80053 COMPREHEN METABOLIC PANEL: CPT | Performed by: INTERNAL MEDICINE

## 2024-01-12 PROCEDURE — 63710000001 INSULIN LISPRO (HUMAN) PER 5 UNITS: Performed by: NURSE PRACTITIONER

## 2024-01-12 PROCEDURE — 99232 SBSQ HOSP IP/OBS MODERATE 35: CPT | Performed by: INTERNAL MEDICINE

## 2024-01-12 PROCEDURE — 85027 COMPLETE CBC AUTOMATED: CPT | Performed by: INTERNAL MEDICINE

## 2024-01-12 PROCEDURE — 25010000002 ENOXAPARIN PER 10 MG: Performed by: STUDENT IN AN ORGANIZED HEALTH CARE EDUCATION/TRAINING PROGRAM

## 2024-01-12 PROCEDURE — 82948 REAGENT STRIP/BLOOD GLUCOSE: CPT

## 2024-01-12 PROCEDURE — 25810000003 LACTATED RINGERS PER 1000 ML: Performed by: NURSE PRACTITIONER

## 2024-01-12 PROCEDURE — 25810000003 SODIUM CHLORIDE 0.9 % SOLUTION 250 ML FLEX CONT: Performed by: HOSPITALIST

## 2024-01-12 PROCEDURE — 63710000001 INSULIN GLARGINE PER 5 UNITS: Performed by: NURSE PRACTITIONER

## 2024-01-12 PROCEDURE — 25010000002 REMDESIVIR 100 MG/20ML SOLUTION 1 EACH VIAL: Performed by: HOSPITALIST

## 2024-01-12 PROCEDURE — 84145 PROCALCITONIN (PCT): CPT | Performed by: NURSE PRACTITIONER

## 2024-01-12 RX ORDER — POTASSIUM CHLORIDE 750 MG/1
40 TABLET, FILM COATED, EXTENDED RELEASE ORAL ONCE
Status: COMPLETED | OUTPATIENT
Start: 2024-01-12 | End: 2024-01-12

## 2024-01-12 RX ORDER — ENOXAPARIN SODIUM 100 MG/ML
40 INJECTION SUBCUTANEOUS EVERY 24 HOURS
Status: DISCONTINUED | OUTPATIENT
Start: 2024-01-12 | End: 2024-01-15 | Stop reason: HOSPADM

## 2024-01-12 RX ADMIN — VANCOMYCIN HYDROCHLORIDE 125 MG: 125 CAPSULE ORAL at 13:00

## 2024-01-12 RX ADMIN — PREGABALIN 50 MG: 25 CAPSULE ORAL at 09:17

## 2024-01-12 RX ADMIN — ATORVASTATIN CALCIUM 80 MG: 80 TABLET, FILM COATED ORAL at 21:29

## 2024-01-12 RX ADMIN — SODIUM CHLORIDE, POTASSIUM CHLORIDE, SODIUM LACTATE AND CALCIUM CHLORIDE 50 ML/HR: 600; 310; 30; 20 INJECTION, SOLUTION INTRAVENOUS at 04:00

## 2024-01-12 RX ADMIN — POTASSIUM CHLORIDE 40 MEQ: 750 TABLET, EXTENDED RELEASE ORAL at 11:36

## 2024-01-12 RX ADMIN — PREGABALIN 50 MG: 25 CAPSULE ORAL at 21:29

## 2024-01-12 RX ADMIN — INSULIN LISPRO 4 UNITS: 100 INJECTION, SOLUTION INTRAVENOUS; SUBCUTANEOUS at 18:08

## 2024-01-12 RX ADMIN — INSULIN GLARGINE 20 UNITS: 100 INJECTION, SOLUTION SUBCUTANEOUS at 09:17

## 2024-01-12 RX ADMIN — VANCOMYCIN HYDROCHLORIDE 125 MG: 125 CAPSULE ORAL at 06:10

## 2024-01-12 RX ADMIN — CASTOR OIL AND BALSAM, PERU 1 APPLICATION: 788; 87 OINTMENT TOPICAL at 09:00

## 2024-01-12 RX ADMIN — DULOXETINE HYDROCHLORIDE 30 MG: 30 CAPSULE, DELAYED RELEASE ORAL at 09:17

## 2024-01-12 RX ADMIN — REMDESIVIR 100 MG: 100 INJECTION, POWDER, LYOPHILIZED, FOR SOLUTION INTRAVENOUS at 00:19

## 2024-01-12 RX ADMIN — ACETAMINOPHEN 650 MG: 325 TABLET, FILM COATED ORAL at 21:29

## 2024-01-12 RX ADMIN — DEXTROSE MONOHYDRATE 25 G: 25 INJECTION, SOLUTION INTRAVENOUS at 06:48

## 2024-01-12 RX ADMIN — PREGABALIN 50 MG: 25 CAPSULE ORAL at 18:08

## 2024-01-12 RX ADMIN — ENOXAPARIN SODIUM 40 MG: 100 INJECTION SUBCUTANEOUS at 18:08

## 2024-01-12 RX ADMIN — INSULIN GLARGINE 20 UNITS: 100 INJECTION, SOLUTION SUBCUTANEOUS at 21:30

## 2024-01-12 RX ADMIN — VANCOMYCIN HYDROCHLORIDE 125 MG: 125 CAPSULE ORAL at 18:08

## 2024-01-12 RX ADMIN — Medication 3 MG: at 21:30

## 2024-01-12 RX ADMIN — VANCOMYCIN HYDROCHLORIDE 125 MG: 125 CAPSULE ORAL at 00:19

## 2024-01-12 RX ADMIN — LEVOTHYROXINE SODIUM 112 MCG: 112 TABLET ORAL at 06:10

## 2024-01-12 NOTE — PROGRESS NOTES
Discharge Planning Assessment  [facility]     Patient Name: Halie Guidry  MRN: [id number]  Today's Date: 1/12/2024    Admit Date: 1/9/2024    Plan: [facility] SNF will need pre-cert   Discharge Needs Assessment    No documentation.                  Discharge Plan       Row Name 01/12/24 1446       Plan    Plan [facility] SNF will need pre-cert    Plan Comments Plan remains to return to [facility] for short-term rehab. [facility] pre-cert required for dc to SNF. Awaiting therapy eval in order to initiate pre-cert. Please call weekend CCP if pt is ready to dc over the weekend CCP # [id number]. Patient requested CCP cancel her appointment with Dr. Gutierrez on Monday d/t being inpatient. S/w Meredith at Dr. Gutierrez's office; CBC will f/u with patient to reschedule.                  Continued Care and Services - Admitted Since 1/9/2024       Destination       Service Provider Request Status Selected Services Address Phone Fax Patient Preferred    [facility] Pending - Request Sent N/A [address] [id number] [phone] --                  Selected Continued Care - Prior Encounters Includes continued care and service providers with selected services from prior encounters from 10/11/2023 to 1/12/2024      Discharged on 12/30/2023 Admission date: 12/24/2023 - Discharge disposition: Skilled Nursing Facility (DC - External)      Destination       Service Provider Selected Services Address Phone Fax Patient Preferred    [facility] [id number] [id number] [phone] --                          Expected Discharge Date and Time       Expected Discharge Date Expected Discharge Time    Jan 13, 2024            Demographic Summary    No documentation.                  Functional Status    No documentation.                  Psychosocial    No documentation.                  Abuse/Neglect    No documentation.                   Legal    No documentation.                  Substance Abuse    No documentation.                  Patient Forms    No documentation.                     Goldie Brock RN

## 2024-01-12 NOTE — PROGRESS NOTES
"Nutrition Services    Patient Name:  Halie Guidry  YOB: 1940  MRN: [id number]  Admit Date:  1/9/2024  Assessment Date:  01/12/24    Summary: Nutrition Follow up:  Pt tolerating HH/ConsCHO diet well with 100% po intake x 4 meals. Pt did not drink Chris yesterday d/t mouth hurting from dentures but now resolved. RN to try and encourage Chris intake today. 1 BM today. SNF placement pending.   Recommend:  Continue Chris BID.  RD to follow per protocol.    CLINICAL NUTRITION ASSESSMENT      Reason for Assessment Follow-up Protocol     Diagnosis/Problem   elevated white blood cell count, Cdiff, COVID, diabetic foot ulcer     Anthropometrics        Current Height  Current Weight  BMI kg/m2 Height: 172.7 cm (67.99\")  Weight: 89 kg (196 lb 3.4 oz) (01/10/24 0027)  Body mass index is 29.84 kg/m².   Adjusted BMI (if applicable)    BMI Category Overweight (25 - 29.9)   Ideal Body Weight (IBW) 140lb   Usual Body Weight (UBW) 160-210   Weight Trend Loss, Gain   --  Labs       Pertinent Labs    Results from last 7 days   Lab Units 01/12/24  0719 01/11/24  0619 01/10/24  0854   SODIUM mmol/L 143 144 142   POTASSIUM mmol/L 3.4* 3.9 3.9   CHLORIDE mmol/L 112* 112* 112*   CO2 mmol/L 20.0* 19.0* 14.3*   BUN mg/dL 24* 31* 41*   CREATININE mg/dL 1.17* 1.40* 1.45*   CALCIUM mg/dL 8.8 8.9 8.4*   BILIRUBIN mg/dL 0.2 0.2 0.2   ALK PHOS U/L 82 93 95   ALT (SGPT) U/L 10 11 11   AST (SGOT) U/L 16 22 22   GLUCOSE mg/dL 212* 113* 146*     Results from last 7 days   Lab Units 01/12/24  0720 01/12/24  0719 01/11/24  0619 01/10/24  0854   MAGNESIUM mg/dL  --   --  1.6 2.0   HEMOGLOBIN g/dL 10.9*  --  11.8* 10.5*   HEMATOCRIT % 33.4*  --  35.0 33.0*   WBC 10*3/mm3 15.88*  --  18.08* 16.06*   ALBUMIN g/dL  --  2.8* 3.3* 2.9*     Results from last 7 days   Lab Units 01/12/24  0720 01/11/24  0619 01/10/24  0854 01/09/24  1758   PLATELETS 10*3/mm3 319 324 350 351     COVID19   Date Value Ref Range Status   01/09/2024 Detected (C) " Not Detected - Ref. Range Final     Lab Results   Component Value Date    HGBA1C 8.20 (H) 12/24/2023          Medications           Scheduled Medications amLODIPine, 10 mg, Oral, Daily  atorvastatin, 80 mg, Oral, Nightly  castor oil-balsam peru, 1 application , Topical, Daily  DULoxetine, 30 mg, Oral, Daily  enoxaparin, 40 mg, Subcutaneous, Q24H  insulin glargine, 20 Units, Subcutaneous, BID  insulin lispro, 2-7 Units, Subcutaneous, 4x Daily AC & at Bedtime  levothyroxine, 112 mcg, Oral, Once per day on Monday Tuesday Wednesday Thursday Friday Saturday  [START ON 1/14/2024] levothyroxine, 168 mcg, Oral, Every Sunday  melatonin, 3 mg, Oral, Nightly  pregabalin, 50 mg, Oral, TID  remdesivir, 100 mg, Intravenous, Q24H  vancomycin, 125 mg, Oral, Q6H       Infusions Pharmacy Consult - Remdesivir,   Pharmacy Consult,   Pharmacy to Dose enoxaparin (LOVENOX),        PRN Medications   acetaminophen    aluminum-magnesium hydroxide-simethicone    benzocaine    benzocaine-menthol    cadexomer iodine    dextrose    dextrose    glucagon (human recombinant)    nitroglycerin    ondansetron ODT **OR** ondansetron    Pharmacy Consult - Remdesivir    Pharmacy Consult    Pharmacy to Dose enoxaparin (LOVENOX)    sodium chloride     Physical Findings          General Findings obese, oriented, room air, sometimes disoriented per RN   Oral/Mouth Cavity tooth or teeth missing dentures hurting yesterday   Edema  lower extremity , 1+ (trace), 2+ (mild)   Gastrointestinal fecal incontinence, last bowel movement: 1/12 x 1   Skin  diabetic ulcer, non-healing wound(s), pressure injury:   Right anterior third toe eschar/scab  Left heel pI DT  Left posterior plantar Diabetic Ulcer   Tubes/Drains/Lines none   NFPE Not indicated at this time   --  Estimated/Assessed Needs        Current Weight  Weight: 89 kg (196 lb 3.4 oz) (01/10/24 0027)       Energy Requirements    Weight for Calculation 89 kg   Method for Estimation  20 kcal/kg   EST Needs  (kcal/day) 1780       Protein Requirements    Weight for Calculation 63.9 kg   EST Protein Needs (g/kg) 1.2 - 1.5 gm/kg   EST Daily Needs (g/day) 76-95       Fluid Requirements     Method for Estimation 1800    EST Needs (mL/day)      Current Nutrition Orders & Evaluation of Intake       Oral Nutrition     Food Allergies NKFA   Current PO Diet Diet: Cardiac Diets, Diabetic Diets; Healthy Heart (2-3 Na+); Consistent Carbohydrate; Texture: Regular Texture (IDDSI 7); Fluid Consistency: Thin (IDDSI 0)   Supplement Chris, BID not drinking d/t mouth hurting yesterday   PO Evaluation     % PO Intake 100%    Factors Affecting Intake: No factors at this time   --  PES STATEMENT / NUTRITION DIAGNOSIS      Nutrition Dx Problem  Problem: Inadequate Oral Intake  Etiology: Factors Affecting Nutrition - decrease appetite     Signs/Symptoms: PO intake and Report/Observation     NUTRITION INTERVENTION / PLAN OF CARE      Intervention Goal(s) Maintain nutrition status, Reduce/improve symptoms, Disease management/therapy, Tolerate PO , Increase intake, No significant weight loss, and PO intake goal %: 75+         RD Intervention/Action Interview for preferences, Encourage intake, Continue to monitor, Care plan reviewed, and Recommend/order: ONS   --      Prescription/Orders:       PO Diet       Supplements Chris BID      Enteral Nutrition       Parenteral Nutrition    New Prescription Ordered? Continue same per protocol   --      Monitor/Evaluation Per protocol   Discharge Plan/Needs Pending clinical course   --    RD to follow per protocol.      Electronically signed by:  Keira Ramirez RD  01/12/24 17:33 EST

## 2024-01-12 NOTE — TELEPHONE ENCOUNTER
Provider: Dr. Gutierrez  Caller: Goldie at [facility]  Relationship to Patient: RN  Reason for Call: No call back needed. Patient is Inpatient... needs to cancel appts from Monday 01/15/24

## 2024-01-12 NOTE — PROGRESS NOTES
ID NOTE    CC: f/u COViD, CDiff, recent foot infection     Subj: No fever.  She was placed on 1 L of nasal cannula overnight.  She says she is not short of air and would like to remove it now that she is awake and eating breakfast.  She is tolerating remdesivir.    Medications:    Current Facility-Administered Medications:     acetaminophen (TYLENOL) tablet 650 mg, 650 mg, Oral, Q6H PRN, Beena Morgan APRN, 650 mg at 01/10/24 2354    aluminum-magnesium hydroxide-simethicone (MAALOX MAX) 400-400-40 MG/5ML suspension 15 mL, 15 mL, Oral, Q6H PRN, Ceci Ram APRN    amLODIPine (NORVASC) tablet 10 mg, 10 mg, Oral, Daily, Peter Sanz APRN, 10 mg at 01/11/24 0857    atorvastatin (LIPITOR) tablet 80 mg, 80 mg, Oral, Nightly, Peter Sanz APRN, 80 mg at 01/11/24 2055    benzocaine (ORAJEL) 10 % mucosal gel, , Mouth/Throat, 4x Daily PRN, Peter Sanz APRN, Given at 01/11/24 1712    benzocaine-menthol (CEPACOL) lozenge 1 each, 1 lozenge, Mouth/Throat, Q2H PRN, Peter Sanz APRN    cadexomer iodine (IODOSORB) 0.9 % gel 1 application , 1 application , Topical, Daily PRN, Ulisses Lobo MD, 1 application  at 01/10/24 1814    castor oil-balsam peru (VENELEX) ointment 1 application , 1 application , Topical, Daily, Ulisses Lobo MD, 1 application  at 01/11/24 0858    dextrose (D50W) (25 g/50 mL) IV injection 25 g, 25 g, Intravenous, Q15 Min PRN, Peter Sanz APRN, 25 g at 01/12/24 0648    dextrose (GLUTOSE) oral gel 15 g, 15 g, Oral, Q15 Min PRN, Peter Sanz APRN    DULoxetine (CYMBALTA) DR capsule 30 mg, 30 mg, Oral, Daily, Peter Sanz APRN, 30 mg at 01/11/24 0857    glucagon (GLUCAGEN) injection 1 mg, 1 mg, Intramuscular, Q15 Min PRN, Peter Sanz APRN    insulin glargine (LANTUS, SEMGLEE) injection 20 Units, 20 Units, Subcutaneous, BID, Peter Sanz APRN, 20 Units at 01/11/24 2055    insulin lispro (HUMALOG/ADMELOG) injection 2-7 Units, 2-7 Units, Subcutaneous, 4x Daily  AC & at Bedtime, Peter Sanz APRN, 2 Units at 01/11/24 1713    lactated ringers infusion, 50 mL/hr, Intravenous, Continuous, Peter Sanz APRN, Last Rate: 50 mL/hr at 01/12/24 0400, 50 mL/hr at 01/12/24 0400    levothyroxine (SYNTHROID, LEVOTHROID) tablet 112 mcg, 112 mcg, Oral, Once per day on Monday Tuesday Wednesday Thursday Friday Saturday, Peter Sanz APRN, 112 mcg at 01/12/24 0610    [START ON 1/14/2024] levothyroxine (SYNTHROID, LEVOTHROID) tablet 168 mcg, 168 mcg, Oral, Every Sunday, Peter Sanz APRN    melatonin tablet 3 mg, 3 mg, Oral, Nightly, Peter Sanz APRN, 3 mg at 01/11/24 2055    nitroglycerin (NITROSTAT) SL tablet 0.4 mg, 0.4 mg, Sublingual, Q5 Min PRN, Ceci Ram APRN    ondansetron ODT (ZOFRAN-ODT) disintegrating tablet 4 mg, 4 mg, Oral, Q6H PRN **OR** ondansetron (ZOFRAN) injection 4 mg, 4 mg, Intravenous, Q6H PRN, Ceci Ram APRN, 4 mg at 01/10/24 1531    Pharmacy Consult - Remdesivir for Severe COVID-19 (Within 7 days of symptom onset), , Does not apply, Continuous PRN, Ceci Ram APRN    Pharmacy Consult, , Does not apply, Continuous PRN, Lei Rodriguez MD    pregabalin (LYRICA) capsule 50 mg, 50 mg, Oral, TID, Peter Sanz APRN, 50 mg at 01/11/24 2055    [COMPLETED] remdesivir 200 mg in sodium chloride 0.9 % 290 mL IVPB (powder vial), 200 mg, Intravenous, Q24H, Stopped at 01/10/24 0355 **FOLLOWED BY** remdesivir 100 mg in sodium chloride 0.9 % 250 mL IVPB (powder vial), 100 mg, Intravenous, Q24H, Vahe Ruiz MD, 100 mg at 01/12/24 0019    sodium chloride 0.9 % flush 10 mL, 10 mL, Intravenous, PRN, Ceci Ram APRN    vancomycin (VANCOCIN) capsule 125 mg, 125 mg, Oral, Q6H, Lei Rodriguez MD, 125 mg at 01/12/24 0610      Objective   Vital Signs   Temp:  [97.2 °F (36.2 °C)-98.6 °F (37 °C)] 98.6 °F (37 °C)  Heart Rate:  [83] 83  Resp:  [16-18] 18  BP: (152-169)/(52-84) 152/52    Physical Exam:    General: awake, alert, in bed, very nice  Eyes: no scleral icterus  Cardiovascular: NR  Respiratory: normal work of breathing on 1 L nasal cannula  GI: Abdomen is soft  MSK: Foot bandaged    Labs:   CBC, CMP, and blood cultures reviewed today  Lab Results   Component Value Date    WBC 15.88 (H) 01/12/2024    HGB 10.9 (L) 01/12/2024    HCT 33.4 (L) 01/12/2024    MCV 80.5 01/12/2024     01/12/2024       Lab Results   Component Value Date    GLUCOSE 212 (H) 01/12/2024    BUN 24 (H) 01/12/2024    CREATININE 1.17 (H) 01/12/2024    EGFRIFNONA 51 (L) 02/28/2022    EGFRIFAFRI 50 (L) 01/18/2021    BCR 20.5 01/12/2024    CO2 20.0 (L) 01/12/2024    CALCIUM 8.8 01/12/2024    PROTENTOTREF 7.2 06/02/2023    ALBUMIN 2.8 (L) 01/12/2024    LABIL2 1.3 06/02/2023    AST 16 01/12/2024    ALT 10 01/12/2024     Procal 17--->9-->2    Microbiology:  1/9/2024 RPP: + COVID-19  1/9/2024 BCx: Negative to date    Prior radiology:  CXR negative for pneumonia      ASSESSMENT/PLAN:  COVID-19 infection  Probable C. difficile diarrhea  Recent diabetic left foot ulcer with infection due to MSSA and Enterobacter  Elevated procalcitonin -better  Leukocytosis -better  Acute kidney injury -better    For COVID-19, I recommend that we continue remdesivir for the 5-day course.  She received dose #3 early this morning.  I do not recommend dexamethasone.  She only required 1 L nasal cannula overnight for the first time it was while she was sleeping.  I suspect she could be weaned from oxygen during the waking daytime hours.  Monitor CMP daily while on remdesivir.  So far things look great.    Continue outpatient C. difficile treatment with vancomycin 125 mg p.o. every 6 hours x 10 days.  I set the stop date for 1/15/2024.  C. difficile could be the cause of her elevated procalcitonin which has come down nicely over the past few days.  I suspect acute kidney injury was also contributing.  Since her chest x-ray does not show any evidence of pneumonia,  I do not see any reason to start her on broad-spectrum antibacterials especially since this could exacerbate her C. difficile.    Thank you for allowing me to be involved in the care of this patient. Infectious diseases will sign off at this time with antibiotics plan in place, but please call me at [phone] if any further ID questions or new ID concerns.

## 2024-01-12 NOTE — PROGRESS NOTES
"[facility] Clinical Pharmacy Services: Enoxaparin Consult    Halie Guidry has a pharmacy consult to dose prophylactic enoxaparin per Dr. Morgan's request.     Indication: VTE Prophylaxis  Home Anticoagulation: none listed     Relevant clinical data and objective history reviewed:  83 y.o. female 172.7 cm (67.99\") 89 kg (196 lb 3.4 oz)   Body mass index is 29.84 kg/m².   Results from last 7 days   Lab Units 01/12/24  0720   PLATELETS 10*3/mm3 319     Estimated Creatinine Clearance: 42.5 mL/min (A) (by C-G formula based on SCr of 1.17 mg/dL (H)).    Assessment/Plan    Will start patient on 40mg subcutaneous every 24 hours, adjusted for renal function. Consult order will be discontinued but pharmacy will continue to follow.     Napoleon Aguero [facility]  Clinical Pharmacist    "

## 2024-01-12 NOTE — PROGRESS NOTES
Name: Halie Guidry ADMIT: 2024   : 1940  PCP: Napoleon Mead MD    MRN: [id number] LOS: 2 days   AGE/SEX: 83 y.o. female  ROOM: [facility]     Subjective   Subjective     No events overnight. She complains of pain all over. Creatinine has nearly normalized. Wbc is coming down. Still having a couple of watery bowel movements/day       Objective   Objective   Vital Signs  Temp:  [98.2 °F (36.8 °C)-98.6 °F (37 °C)] 98.4 °F (36.9 °C)  Heart Rate:  [77-83] 77  Resp:  [16-18] 18  BP: (152-169)/(52-84) 161/64  SpO2:  [74 %-94 %] 94 %  on  Flow (L/min):  [1-2] 2;   Device (Oxygen Therapy): room air  Body mass index is 29.84 kg/m².  Physical Exam  Constitutional:       General: She is not in acute distress.     Appearance: She is not toxic-appearing.   Cardiovascular:      Rate and Rhythm: Normal rate and regular rhythm.      Heart sounds: Normal heart sounds.   Pulmonary:      Effort: Pulmonary effort is normal.      Breath sounds: Normal breath sounds.   Abdominal:      General: Bowel sounds are normal.      Palpations: Abdomen is soft.   Musculoskeletal:         General: Tenderness present.      Right lower leg: No edema.      Left lower leg: No edema.   Neurological:      Mental Status: She is alert.         Results Review     I reviewed the patient's new clinical results.  Results from last 7 days   Lab Units 24  0720 24  0619 01/10/24  0854 24  1758   WBC 10*3/mm3 15.88* 18.08* 16.06* 19.14*   HEMOGLOBIN g/dL 10.9* 11.8* 10.5* 11.5*   PLATELETS 10*3/mm3 319 324 350 351     Results from last 7 days   Lab Units 24  0719 24  0619 01/10/24  0854 24  1758   SODIUM mmol/L 143 144 142 139   POTASSIUM mmol/L 3.4* 3.9 3.9 4.0   CHLORIDE mmol/L 112* 112* 112* 110*   CO2 mmol/L 20.0* 19.0* 14.3* 15.8*   BUN mg/dL 24* 31* 41* 57*   CREATININE mg/dL 1.17* 1.40* 1.45* 2.03*   GLUCOSE mg/dL 212* 113* 146* 212*   Estimated Creatinine Clearance: 42.5 mL/min (A) (by C-G formula  based on SCr of 1.17 mg/dL (H)).  Results from last 7 days   Lab Units 01/12/24  0719 01/11/24  0619 01/10/24  0854 01/09/24  1758   ALBUMIN g/dL 2.8* 3.3* 2.9* 3.1*   BILIRUBIN mg/dL 0.2 0.2 0.2 0.2   ALK PHOS U/L 82 93 95 94   AST (SGOT) U/L 16 22 22 24   ALT (SGPT) U/L 10 11 11 11     Results from last 7 days   Lab Units 01/12/24  0719 01/11/24  0619 01/10/24  0854 01/09/24  1758   CALCIUM mg/dL 8.8 8.9 8.4* 8.4*   ALBUMIN g/dL 2.8* 3.3* 2.9* 3.1*   MAGNESIUM mg/dL  --  1.6 2.0  --      Results from last 7 days   Lab Units 01/12/24  0719 01/10/24  0854 01/09/24  1758   PROCALCITONIN ng/mL 2.20* 9.71* 17.40*   LACTATE mmol/L  --   --  0.9     COVID19   Date Value Ref Range Status   01/09/2024 Detected (C) Not Detected - Ref. Range Final   05/13/2023 Not Detected Not Detected - Ref. Range Final   11/03/2022 Not Detected Not Detected - Ref. Range Final   06/22/2022 Not Detected Not Detected - Ref. Range Final   05/26/2022 Not Detected Not Detected - Ref. Range Final     Glucose   Date/Time Value Ref Range Status   01/12/2024 1106 217 (H) 70 - 130 mg/dL Final   01/12/2024 0729 217 (H) 70 - 130 mg/dL Final   01/12/2024 0645 59 (L) 70 - 130 mg/dL Final   01/12/2024 0620 59 (L) 70 - 130 mg/dL Final   01/11/2024 2054 111 70 - 130 mg/dL Final   01/11/2024 1702 154 (H) 70 - 130 mg/dL Final   01/11/2024 1147 132 (H) 70 - 130 mg/dL Final       CT Head Without Contrast  Narrative: CT OF THE HEAD WITHOUT CONTRAST     HISTORY: Mental status changes     COMPARISON: November 4, 2022     TECHNIQUE: Axial CT imaging was obtained through the brain. No IV  contrast was administered.     FINDINGS:  No acute intracranial hemorrhage is seen. There is diffuse atrophy.  There is periventricular and deep white matter microangiopathic change.  There is no midline shift or mass effect. 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 Mucosal thickening is noted within  the paranasal sinuses.     Impression: No acute  intracranial findings.     Radiation dose reduction techniques were utilized, including automated  exposure control and exposure modulation based on body size.        This report was finalized on 1/11/2024 12:45 AM by Dr. Kristina Forman M.D on Workstation: [id number]       Scheduled Medications  amLODIPine, 10 mg, Oral, Daily  atorvastatin, 80 mg, Oral, Nightly  castor oil-balsam peru, 1 application , Topical, Daily  DULoxetine, 30 mg, Oral, Daily  insulin glargine, 20 Units, Subcutaneous, BID  insulin lispro, 2-7 Units, Subcutaneous, 4x Daily AC & at Bedtime  levothyroxine, 112 mcg, Oral, Once per day on Monday Tuesday Wednesday Thursday Friday Saturday  [START ON 1/14/2024] levothyroxine, 168 mcg, Oral, Every Sunday  melatonin, 3 mg, Oral, Nightly  pregabalin, 50 mg, Oral, TID  remdesivir, 100 mg, Intravenous, Q24H  vancomycin, 125 mg, Oral, Q6H    Infusions  Pharmacy Consult - Remdesivir,   Pharmacy Consult,     Diet  Diet: Cardiac Diets, Diabetic Diets; Healthy Heart (2-3 Na+); Consistent Carbohydrate; Texture: Regular Texture (IDDSI 7); Fluid Consistency: Thin (IDDSI 0)       Assessment/Plan     Active Hospital Problems    Diagnosis  POA    **VIPUL (acute kidney injury) [N17.9]  Yes    COVID-19 [U07.1]  Yes    Diabetic foot ulcer [E11.621, L97.509]  Yes    Primary malignant neuroendocrine tumor of pancreas [C7A.8]  Yes    Anxiety disorder [F41.9]  Yes    COVID-19 virus detected [U07.1]  Yes    Hyperlipidemia [E78.5]  Yes    Primary hypothyroidism [E03.9]  Yes    Type 2 diabetes mellitus with hyperglycemia, with long-term current use of insulin [E11.65, Z79.4]  Not Applicable    Benign essential hypertension [I10]  Yes    Stage 3a chronic kidney disease [N18.31]  Yes      Resolved Hospital Problems   No resolved problems to display.       83 y.o. female admitted with VIPUL (acute kidney injury).    Ms. Guidry is a 83 y.o. former smoker with a history of hypertension, diabetes mellitus type 2, hyperlipidemia,  obesity, C. difficile colitis that presents to [facility] complaining of abnormal lab.     VIPUL on CKD stage IIIa with metabolic acidosis-resolved with IVF. Stop IVF today  COVID-19 without hypoxemia-on remdesivir.   Type 2 diabetes mellitus with hyperglycemia-had some hypoglycemia early this morning. Apparently patient wasn't eating yesterday due to some mouth/denture related pain which is now resolved. Continue current regimen for now and adjust as necessary.  C. Diff-on oral vancomycin with plans for 10 days of therapy  Hyperlipidemia-statin  Benign essential hypertension-BP acceptable acutely  Hypothyroidism-Levothyroxine   Primary malignant neuroendocrine tumor of pancreas-Follows with oncology as outpatient and receiving monthly octreotide injections.  Recent history of infected Diabetic foot ulcer-Status post IV cefepime completed on 1/3/2024.  Anxiety  Pharmacy to dose Lovenox for DVT prophylaxis.  Full code.  Discussed with patient and nursing staff.  Anticipate discharge to SNU facility once arrangements have been made.      Kain Morgan MD  [facility]  01/12/24  15:36 EST    I wore protective equipment throughout this patient encounter including a face mask, gloves and protective eyewear.  Hand hygiene was performed before donning protective equipment and after removal when leaving the room.

## 2024-01-12 NOTE — CONSULTS
I was requested to see patient for spiritual support.  I have gone by room multiple times and each time patient has been sleeping.  I did have prayer for her at bedside, but did not disturb her.  Will attempt to follow  up.

## 2024-01-12 NOTE — PLAN OF CARE
Goal Outcome Evaluation:  Plan of Care Reviewed With: patient        Progress: improving  Outcome Evaluation: pt is axox4, no confusion or hallucinations overnight. q2h turns. PT consulted. heel boots on. left foot dressing C/D/I. iv remdesivir given. iv fluids running. VSS. room air. denies SOA.

## 2024-01-13 LAB
GLUCOSE BLDC GLUCOMTR-MCNC: 205 MG/DL (ref 70–130)
GLUCOSE BLDC GLUCOMTR-MCNC: 210 MG/DL (ref 70–130)
GLUCOSE BLDC GLUCOMTR-MCNC: 247 MG/DL (ref 70–130)
GLUCOSE BLDC GLUCOMTR-MCNC: 267 MG/DL (ref 70–130)

## 2024-01-13 PROCEDURE — 25010000002 ENOXAPARIN PER 10 MG: Performed by: STUDENT IN AN ORGANIZED HEALTH CARE EDUCATION/TRAINING PROGRAM

## 2024-01-13 PROCEDURE — 63710000001 INSULIN LISPRO (HUMAN) PER 5 UNITS: Performed by: NURSE PRACTITIONER

## 2024-01-13 PROCEDURE — 97162 PT EVAL MOD COMPLEX 30 MIN: CPT

## 2024-01-13 PROCEDURE — 97530 THERAPEUTIC ACTIVITIES: CPT

## 2024-01-13 PROCEDURE — 25010000002 REMDESIVIR 100 MG/20ML SOLUTION 1 EACH VIAL: Performed by: HOSPITALIST

## 2024-01-13 PROCEDURE — 25810000003 SODIUM CHLORIDE 0.9 % SOLUTION 250 ML FLEX CONT: Performed by: HOSPITALIST

## 2024-01-13 PROCEDURE — 63710000001 INSULIN GLARGINE PER 5 UNITS: Performed by: STUDENT IN AN ORGANIZED HEALTH CARE EDUCATION/TRAINING PROGRAM

## 2024-01-13 PROCEDURE — 63710000001 INSULIN GLARGINE PER 5 UNITS: Performed by: NURSE PRACTITIONER

## 2024-01-13 PROCEDURE — 82948 REAGENT STRIP/BLOOD GLUCOSE: CPT

## 2024-01-13 RX ADMIN — DULOXETINE HYDROCHLORIDE 30 MG: 30 CAPSULE, DELAYED RELEASE ORAL at 09:25

## 2024-01-13 RX ADMIN — PREGABALIN 50 MG: 25 CAPSULE ORAL at 09:25

## 2024-01-13 RX ADMIN — ATORVASTATIN CALCIUM 80 MG: 80 TABLET, FILM COATED ORAL at 20:57

## 2024-01-13 RX ADMIN — ACETAMINOPHEN 650 MG: 325 TABLET, FILM COATED ORAL at 12:40

## 2024-01-13 RX ADMIN — ACETAMINOPHEN 650 MG: 325 TABLET, FILM COATED ORAL at 18:11

## 2024-01-13 RX ADMIN — INSULIN LISPRO 3 UNITS: 100 INJECTION, SOLUTION INTRAVENOUS; SUBCUTANEOUS at 12:40

## 2024-01-13 RX ADMIN — INSULIN LISPRO 4 UNITS: 100 INJECTION, SOLUTION INTRAVENOUS; SUBCUTANEOUS at 18:12

## 2024-01-13 RX ADMIN — ENOXAPARIN SODIUM 40 MG: 100 INJECTION SUBCUTANEOUS at 18:11

## 2024-01-13 RX ADMIN — VANCOMYCIN HYDROCHLORIDE 125 MG: 125 CAPSULE ORAL at 00:58

## 2024-01-13 RX ADMIN — ACETAMINOPHEN 650 MG: 325 TABLET, FILM COATED ORAL at 03:22

## 2024-01-13 RX ADMIN — VANCOMYCIN HYDROCHLORIDE 125 MG: 125 CAPSULE ORAL at 18:11

## 2024-01-13 RX ADMIN — VANCOMYCIN HYDROCHLORIDE 125 MG: 125 CAPSULE ORAL at 12:43

## 2024-01-13 RX ADMIN — AMLODIPINE BESYLATE 10 MG: 10 TABLET ORAL at 09:25

## 2024-01-13 RX ADMIN — Medication 3 MG: at 20:57

## 2024-01-13 RX ADMIN — INSULIN GLARGINE 30 UNITS: 100 INJECTION, SOLUTION SUBCUTANEOUS at 20:57

## 2024-01-13 RX ADMIN — INSULIN GLARGINE 20 UNITS: 100 INJECTION, SOLUTION SUBCUTANEOUS at 09:25

## 2024-01-13 RX ADMIN — PREGABALIN 50 MG: 25 CAPSULE ORAL at 18:11

## 2024-01-13 RX ADMIN — VANCOMYCIN HYDROCHLORIDE 125 MG: 125 CAPSULE ORAL at 06:20

## 2024-01-13 RX ADMIN — INSULIN LISPRO 3 UNITS: 100 INJECTION, SOLUTION INTRAVENOUS; SUBCUTANEOUS at 09:25

## 2024-01-13 RX ADMIN — CASTOR OIL AND BALSAM, PERU 1 APPLICATION: 788; 87 OINTMENT TOPICAL at 09:25

## 2024-01-13 RX ADMIN — LEVOTHYROXINE SODIUM 112 MCG: 112 TABLET ORAL at 06:20

## 2024-01-13 NOTE — PLAN OF CARE
Goal Outcome Evaluation:      A/O, Tylenol given for back/foot pain, foot dressings changed, space boots worn, powder applied to excoriated perineum, purewick removed due to skin agitation, incontinence care provided. Remdesivir and vancomycin continued. All needs met.

## 2024-01-13 NOTE — PROGRESS NOTES
Name: Halie Guidry ADMIT: 2024   : 1940  PCP: Napoleon Mead MD    MRN: [id number] LOS: 3 days   AGE/SEX: 83 y.o. female  ROOM: [facility]     Subjective   Subjective     No events overnight. She has many complaints, though none of them are medically related. She reports that her diarrhea has improved.       Objective   Objective   Vital Signs  Temp:  [98.4 °F (36.9 °C)-98.8 °F (37.1 °C)] 98.4 °F (36.9 °C)  Heart Rate:  [82] 82  Resp:  [18] 18  BP: (138-179)/(67-72) 161/69  SpO2:  [92 %-94 %] 92 %  on   ;   Device (Oxygen Therapy): room air  Body mass index is 29.84 kg/m².  Physical Exam  Constitutional:       General: She is not in acute distress.     Appearance: She is not toxic-appearing.   Cardiovascular:      Rate and Rhythm: Normal rate and regular rhythm.      Heart sounds: Normal heart sounds.   Pulmonary:      Effort: Pulmonary effort is normal.      Breath sounds: Normal breath sounds.   Abdominal:      General: Bowel sounds are normal.      Palpations: Abdomen is soft.   Musculoskeletal:         General: Tenderness present.      Right lower leg: No edema.      Left lower leg: No edema.   Neurological:      Mental Status: She is alert.         Results Review     I reviewed the patient's new clinical results.  Results from last 7 days   Lab Units 24  0720 24  0619 01/10/24  0854 24  1758   WBC 10*3/mm3 15.88* 18.08* 16.06* 19.14*   HEMOGLOBIN g/dL 10.9* 11.8* 10.5* 11.5*   PLATELETS 10*3/mm3 319 324 350 351     Results from last 7 days   Lab Units 24  0719 24  0619 01/10/24  0854 24  1758   SODIUM mmol/L 143 144 142 139   POTASSIUM mmol/L 3.4* 3.9 3.9 4.0   CHLORIDE mmol/L 112* 112* 112* 110*   CO2 mmol/L 20.0* 19.0* 14.3* 15.8*   BUN mg/dL 24* 31* 41* 57*   CREATININE mg/dL 1.17* 1.40* 1.45* 2.03*   GLUCOSE mg/dL 212* 113* 146* 212*   Estimated Creatinine Clearance: 42.5 mL/min (A) (by C-G formula based on SCr of 1.17 mg/dL (H)).  Results from last 7  days   Lab Units 01/12/24  0719 01/11/24  0619 01/10/24  0854 01/09/24  1758   ALBUMIN g/dL 2.8* 3.3* 2.9* 3.1*   BILIRUBIN mg/dL 0.2 0.2 0.2 0.2   ALK PHOS U/L 82 93 95 94   AST (SGOT) U/L 16 22 22 24   ALT (SGPT) U/L 10 11 11 11     Results from last 7 days   Lab Units 01/12/24  0719 01/11/24  0619 01/10/24  0854 01/09/24  1758   CALCIUM mg/dL 8.8 8.9 8.4* 8.4*   ALBUMIN g/dL 2.8* 3.3* 2.9* 3.1*   MAGNESIUM mg/dL  --  1.6 2.0  --      Results from last 7 days   Lab Units 01/12/24  0719 01/10/24  0854 01/09/24  1758   PROCALCITONIN ng/mL 2.20* 9.71* 17.40*   LACTATE mmol/L  --   --  0.9     COVID19   Date Value Ref Range Status   01/09/2024 Detected (C) Not Detected - Ref. Range Final   05/13/2023 Not Detected Not Detected - Ref. Range Final   11/03/2022 Not Detected Not Detected - Ref. Range Final   06/22/2022 Not Detected Not Detected - Ref. Range Final   05/26/2022 Not Detected Not Detected - Ref. Range Final     Glucose   Date/Time Value Ref Range Status   01/13/2024 1128 210 (H) 70 - 130 mg/dL Final   01/13/2024 0651 205 (H) 70 - 130 mg/dL Final   01/12/2024 2115 110 70 - 130 mg/dL Final   01/12/2024 1613 260 (H) 70 - 130 mg/dL Final   01/12/2024 1106 217 (H) 70 - 130 mg/dL Final   01/12/2024 0729 217 (H) 70 - 130 mg/dL Final   01/12/2024 0645 59 (L) 70 - 130 mg/dL Final       CT Head Without Contrast  Narrative: CT OF THE HEAD WITHOUT CONTRAST     HISTORY: Mental status changes     COMPARISON: November 4, 2022     TECHNIQUE: Axial CT imaging was obtained through the brain. No IV  contrast was administered.     FINDINGS:  No acute intracranial hemorrhage is seen. There is diffuse atrophy.  There is periventricular and deep white matter microangiopathic change.  There is no midline shift or mass effect. 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 Mucosal thickening is noted within  the paranasal sinuses.     Impression: No acute intracranial findings.     Radiation dose reduction  techniques were utilized, including automated  exposure control and exposure modulation based on body size.        This report was finalized on 1/11/2024 12:45 AM by Dr. Kristina Forman M.D on Workstation: [id number]       Scheduled Medications  amLODIPine, 10 mg, Oral, Daily  atorvastatin, 80 mg, Oral, Nightly  castor oil-balsam peru, 1 application , Topical, Daily  DULoxetine, 30 mg, Oral, Daily  enoxaparin, 40 mg, Subcutaneous, Q24H  insulin glargine, 20 Units, Subcutaneous, BID  insulin lispro, 2-7 Units, Subcutaneous, 4x Daily AC & at Bedtime  levothyroxine, 112 mcg, Oral, Once per day on Monday Tuesday Wednesday Thursday Friday Saturday  [START ON 1/14/2024] levothyroxine, 168 mcg, Oral, Every Sunday  melatonin, 3 mg, Oral, Nightly  pregabalin, 50 mg, Oral, TID  remdesivir, 100 mg, Intravenous, Q24H  vancomycin, 125 mg, Oral, Q6H    Infusions  Pharmacy Consult,     Diet  Diet: Cardiac Diets, Diabetic Diets; Healthy Heart (2-3 Na+); Consistent Carbohydrate; Texture: Regular Texture (IDDSI 7); Fluid Consistency: Thin (IDDSI 0)       Assessment/Plan     Active Hospital Problems    Diagnosis  POA    **VIPUL (acute kidney injury) [N17.9]  Yes    COVID-19 [U07.1]  Yes    Diabetic foot ulcer [E11.621, L97.509]  Yes    Primary malignant neuroendocrine tumor of pancreas [C7A.8]  Yes    Anxiety disorder [F41.9]  Yes    COVID-19 virus detected [U07.1]  Yes    Hyperlipidemia [E78.5]  Yes    Primary hypothyroidism [E03.9]  Yes    Type 2 diabetes mellitus with hyperglycemia, with long-term current use of insulin [E11.65, Z79.4]  Not Applicable    Benign essential hypertension [I10]  Yes    Stage 3a chronic kidney disease [N18.31]  Yes      Resolved Hospital Problems   No resolved problems to display.       83 y.o. female admitted with VIPUL (acute kidney injury).    Ms. Guidry is a 83 y.o. former smoker with a history of hypertension, diabetes mellitus type 2, hyperlipidemia, obesity, C. difficile colitis that presents  to [facility] complaining of abnormal lab.     VIPUL on CKD stage IIIa with metabolic acidosis-resolved with IVF.   COVID-19 without hypoxemia-on remdesivir.   Type 2 diabetes mellitus with hyperglycemia-increase lantus to half of home dose at 30 units bid  C. Diff-on oral vancomycin with plans for 10 days of therapy  Hyperlipidemia-statin  Benign essential hypertension-BP acceptable acutely  Hypothyroidism-Levothyroxine   Primary malignant neuroendocrine tumor of pancreas-Follows with oncology as outpatient and receiving monthly octreotide injections.  Recent history of infected Diabetic foot ulcer-Status post IV cefepime completed on 1/3/2024.  Anxiety  Pharmacy to dose Lovenox for DVT prophylaxis.  Full code.  Discussed with patient and nursing staff.  Anticipate discharge to SNU facility once arrangements have been made.      Kain Morgan MD  [facility]  01/13/24  15:01 EST    I wore protective equipment throughout this patient encounter including a face mask, gloves and protective eyewear.  Hand hygiene was performed before donning protective equipment and after removal when leaving the room.

## 2024-01-13 NOTE — PLAN OF CARE
Goal Outcome Evaluation:  Plan of Care Reviewed With: patient           Outcome Evaluation: Pt is an 84 yo F admitted from SNF with abnormal labs. Per chart, recent COVID and cdiff dx. Pt recently DC from [facility] with L diabetic foot ulcer - small I&D that admission. Pt reports she was independent at  with a rwx, lives with her son. Pt reports she's not sure if she had started working with therapy at rehab yet, with several complaints at beginning of session about general care. Pt presents to PT with impaired strength, endurance, and pain limiting overall mobility. Pt transferred to EOB with min A x1, STS with mod A x1 and rwx, took a few steps to the chair with CGA-min A x1. Pt with c/o generalized BLE pain and L foot pain - reports she previously had a post-op shoe but lost it and hasn't been wearing it since DC from hospital. Pt able to stand at chair while PT provided total A with cleaning and changing brief after a BM. Pt left sitting [facility] with needs met, encouraged to call out for assist back to bed and to sit [facility] for all meals. PT will continue to follow, anticipate DC back to SNF.      Anticipated Discharge Disposition (PT): skilled nursing facility

## 2024-01-13 NOTE — THERAPY EVALUATION
Patient Name: Halie Guidry  : 1940    MRN: [id number]                              Today's Date: 2024       Admit Date: 2024    Visit Dx:     ICD-10-CM ICD-9-CM   1. Acute renal insufficiency  N28.9 593.9   2. Leukocytosis, unspecified type  D72.829 288.60   3. C. difficile colitis  A04.72 008.45   4. COVID-19  U07.1 079.89     Patient Active Problem List   Diagnosis    Compression fracture    Lumbar degenerative disc disease    Type 2 diabetes mellitus with hyperglycemia, with long-term current use of insulin    Hyperlipidemia    Benign essential hypertension    Primary hypothyroidism    Neuropathy    Osteoporosis    Proteinuria    Tobacco abuse    Diabetic eye exam    Generalized weakness    Spinal stenosis    Scoliosis    Arthritis    VBI (vertebrobasilar insufficiency)    Orthostatic hypotension    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    Severe hypothyroidism    Noncompliance with medication regimen    Vitamin D deficiency disease    Altered mental status    Tremor    Seizure    Stage 3a chronic kidney disease    Thoracic degenerative disc disease    Metabolic encephalopathy    VIPUL (acute kidney injury)    Hyperglycemia    Type II diabetes mellitus, uncontrolled    Lower abdominal pain    Transaminitis    COVID-19 virus detected    UTI (urinary tract infection)    Emphysematous cystitis    Choledocholithiasis    Hypokalemia    Hypoxia    Generalized abdominal pain    History of Clostridium difficile infection    History of ERCP    Acute UTI (urinary tract infection)    Hypomagnesemia    Burning pain    Anxiety disorder    Neuropathic pain    Intertrigo    Weakness of both lower extremities    Accelerated hypertension    Acute cystitis without hematuria    Altered mental status, unspecified altered mental status type    Left lower lobe pneumonia    Acute pain of left foot    Cellulitis and abscess of left lower extremity    Hypertensive kidney disease with stage 3a  chronic kidney disease    Bilateral leg pain    Peripheral edema    Primary malignant neuroendocrine tumor of pancreas    Encounter for long-term (current) use of high-risk medication    Diabetic foot ulcer    Acute renal insufficiency    COVID-19     Past Medical History:   Diagnosis Date    Acute metabolic encephalopathy 6/24/2022    Anxiety     Arthritis     Benign essential hypertension 08/20/2014    Bleeding disorder     Depression     Diabetes     Diabetes mellitus, type 2     Disc degeneration, lumbar     Headache, tension-type     Hyperlipidemia     Hypothyroidism     Neuropathy     Osteoporosis 09/09/2015    Peripheral neuropathy     Rotator cuff tear, left     Scoliosis     Shoulder pain     LEFT, TORN ROTATOR CUFF S/P FALL    Spinal stenosis      Past Surgical History:   Procedure Laterality Date    APPENDECTOMY      BILATERAL BREAST REDUCTION Bilateral 08/2015    CATARACT EXTRACTION  03/2015    CHOLECYSTECTOMY WITH INTRAOPERATIVE CHOLANGIOGRAM N/A 3/27/2022    Procedure: CHOLECYSTECTOMY LAPAROSCOPIC INTRAOPERATIVE CHOLANGIOGRAM;  Surgeon: Aiyana Hill MD;  Location: [facility] OR;  Service: General;  Laterality: N/A;    COLONOSCOPY  06/05/2015    WNL    ERCP N/A 2/25/2022    Procedure: ENDOSCOPIC RETROGRADE CHOLANGIOPANCREATOGRAPHY with sphincterotomy and balloon sweep;  Surgeon: Chilo Wilhelm MD;  Location: [facility] ENDOSCOPY;  Service: Gastroenterology;  Laterality: N/A;  PRE/POST - CBD stones    ERCP N/A 3/28/2022    Procedure: ENDOSCOPIC RETROGRADE CHOLANGIOPANCREATOGRAPHY WITH SPHINCTEROTOMY AND BALLOON SWEEP;  Surgeon: Chilo Wilhelm MD;  Location: [facility] ENDOSCOPY;  Service: Gastroenterology;  Laterality: N/A;  PRE: COMMON DUCT STONE  POST: COMMON DUCT STONE    KYPHOPLASTY      REDUCTION MAMMAPLASTY      TONSILLECTOMY        General Information       Row Name 01/13/24 1419          Physical Therapy Time and Intention    Document Type evaluation  -     Mode of Treatment individual  therapy;physical therapy  -       Row Name 01/13/24 1419          General Information    Patient Profile Reviewed yes  -     Prior Level of Function independent:;gait;transfer;bed mobility  -     Existing Precautions/Restrictions fall  -     Barriers to Rehab medically complex  -       Row Name 01/13/24 1419          Living Environment    People in Home child(beatriz), adult;facility resident  Admitted from SNF, lives with son at   -       Row Name 01/13/24 1419          Cognition    Orientation Status (Cognition) oriented x 4  -       Row Name 01/13/24 1419          Safety Issues, Functional Mobility    Impairments Affecting Function (Mobility) balance;endurance/activity tolerance;strength;pain  -               User Key  (r) = Recorded By, (t) = Taken By, (c) = Cosigned By      Initials Name Provider Type     Alia Owusu PT Physical Therapist                   Mobility       [facility] Name 01/13/24 1419          Bed Mobility    Bed Mobility supine-sit  -     Supine-Sit [place] (Bed Mobility) minimum assist (75% patient effort);1 person assist;verbal cues  -     Assistive Device (Bed Mobility) bed rails;head of bed elevated  -[facility] Name 01/13/24 1419          Sit-Stand Transfer    Sit-Stand [place] (Transfers) moderate assist (50% patient effort);1 person assist;verbal cues  -     Assistive Device (Sit-Stand Transfers) walker, front-wheeled  -[facility] Name 01/13/24 1419          Gait/Stairs (Locomotion)    [place] Level (Gait) 1 person assist;verbal cues;contact guard;minimum assist (75% patient effort)  -     Assistive Device (Gait) walker, front-wheeled  -     Distance in Feet (Gait) 2ft to chair  -     Deviations/Abnormal Patterns (Gait) antalgic;tess decreased;gait speed decreased;stride length decreased  -     Bilateral Gait Deviations forward flexed posture;heel strike decreased  -               User Key  (r) = Recorded By, (t) = Taken By, (c) =  Cosigned By      Initials Name Provider Type     Alia Owusu PT Physical Therapist                   Obj/Interventions       Row Name 01/13/24 1420          Range of Motion Comprehensive    General Range of Motion lower extremity range of motion deficits identified  -     Comment, General Range of Motion Impaired B knee extension  -       Row Name 01/13/24 1420          Strength Comprehensive (MMT)    General Manual Muscle Testing (MMT) Assessment lower extremity strength deficits identified  -     Comment, General Manual Muscle Testing (MMT) Assessment Generalized weakness, BLE grossly 3+/5  -       Row Name 01/13/24 1420          Balance    Balance Assessment sitting static balance;sitting dynamic balance;standing static balance;standing dynamic balance  -     Static Sitting Balance standby assist  -     Dynamic Sitting Balance standby assist  -     Position, Sitting Balance unsupported;sitting edge of bed  -     Static Standing Balance contact guard;verbal cues  -     Dynamic Standing Balance minimal assist;verbal cues  -     Position/Device Used, Standing Balance supported;walker, front-wheeled  -     Balance Interventions sitting;standing;sit to stand;supported;static;dynamic  -[facility] Name 01/13/24 1420          Sensory Assessment (Somatosensory)    Sensory Assessment (Somatosensory) LE sensation intact;other (see comments)  Significant BLE sensitivity to touch  -               User Key  (r) = Recorded By, (t) = Taken By, (c) = Cosigned By      Initials Name Provider Type     Alia Owusu PT Physical Therapist                   Goals/Plan       Row Name 01/13/24 1428          Bed Mobility Goal 1 (PT)    Activity/Assistive Device (Bed Mobility Goal 1, PT) bed mobility activities, all  -     [place] Level/Cues Needed (Bed Mobility Goal 1, PT) standby assist  -     Time Frame (Bed Mobility Goal 1, PT) 1 week  -       Row Name 01/13/24 1428          Transfer  Goal 1 (PT)    Activity/Assistive Device (Transfer Goal 1, PT) transfers, all  -     [place] Level/Cues Needed (Transfer Goal 1, PT) contact guard required  -     Time Frame (Transfer Goal 1, PT) 1 week  -       Row Name 01/13/24 1428          Gait Training Goal 1 (PT)    Activity/Assistive Device (Gait Training Goal 1, PT) gait (walking locomotion)  -     [place] Level (Gait Training Goal 1, PT) standby assist  -     Distance (Gait Training Goal 1, PT) 30ft  -     Time Frame (Gait Training Goal 1, PT) 1 week  -       Row Name 01/13/24 1428          Therapy Assessment/Plan (PT)    Planned Therapy Interventions (PT) balance training;bed mobility training;gait training;patient/family education;home exercise program;strengthening;transfer training  -               User Key  (r) = Recorded By, (t) = Taken By, (c) = Cosigned By      Initials Name Provider Type     Alia Owusu, PT Physical Therapist                   Clinical Impression       Row Name 01/13/24 1421          Pain    Pretreatment Pain Rating 3/10  -     Posttreatment Pain Rating 6/10  -     Pain Location - Side/Orientation Bilateral  -     Pain Location lower  -     Pain Location - extremity  -     Pain Intervention(s) Repositioned;Ambulation/increased activity;Rest  -       Row Name 01/13/24 1421          Plan of Care Review    Plan of Care Reviewed With patient  -     Outcome Evaluation Pt is an 84 yo F admitted from [facility] with abnormal labs. Per chart, recent COVID and cdiff dx. Pt recently DC from [facility] with L diabetic foot ulcer - small I&D that admission. Pt reports she was independent at  with a rwx, lives with her son. Pt reports she's not sure if she had started working with therapy at rehab yet, with several complaints at beginning of session about general care. Pt presents to PT with impaired strength, endurance, and pain limiting overall mobility. Pt transferred to EOB with min A x1, STS with mod A x1  and rwx, took a few steps to the chair with CGA-min A x1. Pt with c/o generalized BLE pain and L foot pain - reports she previously had a post-op shoe but lost it and hasn't been wearing it since DC from hospital. Pt able to stand at chair while PT provided total A with cleaning and changing brief after a BM. Pt left sitting UIC with needs met, encouraged to call out for assist back to bed and to sit UIC for all meals. PT will continue to follow, anticipate DC back to SNF.  -       Row Name 01/13/24 1421          Therapy Assessment/Plan (PT)    Patient/Family Therapy Goals Statement (PT) Return to OF  -     Rehab Potential (PT) good, to achieve stated therapy goals  -     Criteria for Skilled Interventions Met (PT) yes  -     Therapy Frequency (PT) 3 times/wk  -       Row Name 01/13/24 1421          Vital Signs    O2 Delivery Pre Treatment room air  -     O2 Delivery Intra Treatment room air  -     O2 Delivery Post Treatment room air  -       Row Name 01/13/24 1421          Positioning and Restraints    Pre-Treatment Position in bed  -     Post Treatment Position chair  -     In Chair notified nsg;reclined;call light within reach;encouraged to call for assist;exit alarm on  -               User Key  (r) = Recorded By, (t) = Taken By, (c) = Cosigned By      Initials Name Provider Type     Alia Owusu, PT Physical Therapist                   Outcome Measures       Row Name 01/13/24 1428 01/13/24 0950       How much help from another person do you currently need...    Turning from your back to your side while in flat bed without using bedrails? 3  - 3  -ES    Moving from lying on back to sitting on the side of a flat bed without bedrails? 3  - 3  -ES    Moving to and from a bed to a chair (including a wheelchair)? 3  - 2  -ES    Standing up from a chair using your arms (e.g., wheelchair, bedside chair)? 2  - 2  -ES    Climbing 3-5 steps with a railing? 1  - 1  -ES    To walk in  hospital room? 2  -BH 1  -ES    AM-PAC 6 Clicks Score (PT) 14  - 12  -ES    Highest Level of Mobility Goal 4 --> Transfer to chair/commode  -BH 4 --> Transfer to chair/commode  -ES      Row Name 01/13/24 0322          How much help from another person do you currently need...    Turning from your back to your side while in flat bed without using bedrails? 3  -EK     Moving from lying on back to sitting on the side of a flat bed without bedrails? 3  -EK     Moving to and from a bed to a chair (including a wheelchair)? 2  -EK     Standing up from a chair using your arms (e.g., wheelchair, bedside chair)? 2  -EK     Climbing 3-5 steps with a railing? 1  -EK     To walk in hospital room? 1  -EK     AM-PAC 6 Clicks Score (PT) 12  -EK     Highest Level of Mobility Goal 4 --> Transfer to chair/commode  -EK       Row Name 01/13/24 1428          Functional Assessment    Outcome Measure Options AM-PAC 6 Clicks Basic Mobility (PT)  -               User Key  (r) = Recorded By, (t) = Taken By, (c) = Cosigned By      Initials Name Provider Type     Alia Owusu, PT Physical Therapist    Vaishali Ny, RN Registered Nurse    Vaishali Ballard RN Registered Nurse                                 Physical Therapy Education       Title: PT OT SLP Therapies (Done)       Topic: Physical Therapy (Done)       Point: Mobility training (Done)       Learning Progress Summary             Patient Acceptance, E,TB,D, VU,NR by  at 1/13/2024 1428    Acceptance, E, NR by  at 1/10/2024 0414                         Point: Home exercise program (Done)       Learning Progress Summary             Patient Acceptance, E,TB,D, VU,NR by  at 1/13/2024 1428    Acceptance, E, NR by  at 1/10/2024 0414                         Point: Body mechanics (Done)       Learning Progress Summary             Patient Acceptance, E,TB,D, VU,NR by  at 1/13/2024 1428    Acceptance, E, NR by  at 1/10/2024 0414                         Point:  Precautions (Done)       Learning Progress Summary             Patient Acceptance, E,TB,D, VU,NR by  at 1/13/2024 1428    Acceptance, E, NR by  at 1/10/2024 7565                                         User Key       Initials Effective Dates Name Provider Type Discipline     06/16/21 -  Deepika Triplett, RN Registered Nurse Nurse     04/08/22 -  Alia Owusu, PT Physical Therapist PT                  PT Recommendation and Plan  Planned Therapy Interventions (PT): balance training, bed mobility training, gait training, patient/family education, home exercise program, strengthening, transfer training  Plan of Care Reviewed With: patient  Outcome Evaluation: Pt is an 84 yo F admitted from [facility] with abnormal labs. Per chart, recent COVID and cdiff dx. Pt recently DC from [facility] with L diabetic foot ulcer - small I&D that admission. Pt reports she was independent at  with a rwx, lives with her son. Pt reports she's not sure if she had started working with therapy at rehab yet, with several complaints at beginning of session about general care. Pt presents to PT with impaired strength, endurance, and pain limiting overall mobility. Pt transferred to EOB with min A x1, STS with mod A x1 and rwx, took a few steps to the chair with CGA-min A x1. Pt with c/o generalized BLE pain and L foot pain - reports she previously had a post-op shoe but lost it and hasn't been wearing it since DC from hospital. Pt able to stand at chair while PT provided total A with cleaning and changing brief after a BM. Pt left sitting UIC with needs met, encouraged to call out for assist back to bed and to sit UIC for all meals. PT will continue to follow, anticipate DC back to SNF.     Time Calculation:   PT Evaluation Complexity  History, PT Evaluation Complexity: 1-2 personal factors and/or comorbidities  Examination of Body Systems (PT Eval Complexity): total of 3 or more elements  Clinical Presentation (PT Evaluation Complexity):  evolving  Clinical Decision Making (PT Evaluation Complexity): moderate complexity  Overall Complexity (PT Evaluation Complexity): moderate complexity     PT Charges       Row Name 01/13/24 1428             Time Calculation    Start Time 1342  -      Stop Time 1408  -      Time Calculation (min) 26 min  -      PT Received On 01/13/24  -      PT - Next Appointment 01/15/24  -      PT Goal Re-Cert Due Date 01/20/24  -         Time Calculation- PT    Total Timed Code Minutes- PT 15 minute(s)  -         Timed Charges    70912 - Gait Training Minutes  5  -      08368 - PT Therapeutic Activity Minutes 10  -         Total Minutes    Timed Charges Total Minutes 15  -       Total Minutes 15  -BH                User Key  (r) = Recorded By, (t) = Taken By, (c) = Cosigned By      Initials Name Provider Type     Alia Owusu, PT Physical Therapist                  Therapy Charges for Today       Code Description Service Date Service Provider Modifiers Qty    92697539190 HC PT THERAPEUTIC ACT EA 15 MIN 1/13/2024 Alia Owusu, PT GP 1    [id number]  PT EVAL MOD COMPLEXITY 3 1/13/2024 Alia Owusu, PT GP 1            PT G-Codes  Outcome Measure Options: AM-PAC 6 Clicks Basic Mobility (PT)  AM-PAC 6 Clicks Score (PT): 14  PT Discharge Summary  Anticipated Discharge Disposition (PT): skilled nursing facility    Alia Owusu PT  1/13/2024

## 2024-01-14 LAB
BACTERIA SPEC AEROBE CULT: NORMAL
BACTERIA SPEC AEROBE CULT: NORMAL
GLUCOSE BLDC GLUCOMTR-MCNC: 116 MG/DL (ref 70–130)
GLUCOSE BLDC GLUCOMTR-MCNC: 178 MG/DL (ref 70–130)
GLUCOSE BLDC GLUCOMTR-MCNC: 195 MG/DL (ref 70–130)
GLUCOSE BLDC GLUCOMTR-MCNC: 281 MG/DL (ref 70–130)
GLUCOSE BLDC GLUCOMTR-MCNC: 58 MG/DL (ref 70–130)
GLUCOSE BLDC GLUCOMTR-MCNC: 66 MG/DL (ref 70–130)

## 2024-01-14 PROCEDURE — 25010000002 ENOXAPARIN PER 10 MG: Performed by: STUDENT IN AN ORGANIZED HEALTH CARE EDUCATION/TRAINING PROGRAM

## 2024-01-14 PROCEDURE — 25810000003 SODIUM CHLORIDE 0.9 % SOLUTION 250 ML FLEX CONT: Performed by: HOSPITALIST

## 2024-01-14 PROCEDURE — 63710000001 INSULIN LISPRO (HUMAN) PER 5 UNITS: Performed by: NURSE PRACTITIONER

## 2024-01-14 PROCEDURE — 25010000002 ONDANSETRON PER 1 MG: Performed by: NURSE PRACTITIONER

## 2024-01-14 PROCEDURE — 82948 REAGENT STRIP/BLOOD GLUCOSE: CPT

## 2024-01-14 PROCEDURE — 63710000001 INSULIN GLARGINE PER 5 UNITS: Performed by: STUDENT IN AN ORGANIZED HEALTH CARE EDUCATION/TRAINING PROGRAM

## 2024-01-14 PROCEDURE — 63710000001 INSULIN LISPRO (HUMAN) PER 5 UNITS: Performed by: STUDENT IN AN ORGANIZED HEALTH CARE EDUCATION/TRAINING PROGRAM

## 2024-01-14 PROCEDURE — 25010000002 REMDESIVIR 100 MG/20ML SOLUTION 1 EACH VIAL: Performed by: HOSPITALIST

## 2024-01-14 RX ORDER — INSULIN LISPRO 100 [IU]/ML
5 INJECTION, SOLUTION INTRAVENOUS; SUBCUTANEOUS
Status: DISCONTINUED | OUTPATIENT
Start: 2024-01-14 | End: 2024-01-15 | Stop reason: HOSPADM

## 2024-01-14 RX ORDER — LISINOPRIL 40 MG/1
40 TABLET ORAL DAILY
Status: DISCONTINUED | OUTPATIENT
Start: 2024-01-14 | End: 2024-01-15 | Stop reason: HOSPADM

## 2024-01-14 RX ORDER — BISOPROLOL FUMARATE 5 MG/1
5 TABLET, FILM COATED ORAL DAILY
Status: DISCONTINUED | OUTPATIENT
Start: 2024-01-14 | End: 2024-01-15 | Stop reason: HOSPADM

## 2024-01-14 RX ORDER — FUROSEMIDE 20 MG/1
20 TABLET ORAL DAILY
Status: DISCONTINUED | OUTPATIENT
Start: 2024-01-14 | End: 2024-01-15 | Stop reason: HOSPADM

## 2024-01-14 RX ADMIN — Medication 3 MG: at 21:30

## 2024-01-14 RX ADMIN — ENOXAPARIN SODIUM 40 MG: 100 INJECTION SUBCUTANEOUS at 17:09

## 2024-01-14 RX ADMIN — ACETAMINOPHEN 650 MG: 325 TABLET, FILM COATED ORAL at 21:30

## 2024-01-14 RX ADMIN — BISOPROLOL FUMARATE 5 MG: 5 TABLET, FILM COATED ORAL at 18:03

## 2024-01-14 RX ADMIN — ATORVASTATIN CALCIUM 80 MG: 80 TABLET, FILM COATED ORAL at 21:30

## 2024-01-14 RX ADMIN — ACETAMINOPHEN 650 MG: 325 TABLET, FILM COATED ORAL at 13:20

## 2024-01-14 RX ADMIN — FUROSEMIDE 20 MG: 20 TABLET ORAL at 17:10

## 2024-01-14 RX ADMIN — VANCOMYCIN HYDROCHLORIDE 125 MG: 125 CAPSULE ORAL at 06:07

## 2024-01-14 RX ADMIN — INSULIN LISPRO 2 UNITS: 100 INJECTION, SOLUTION INTRAVENOUS; SUBCUTANEOUS at 17:10

## 2024-01-14 RX ADMIN — INSULIN GLARGINE 25 UNITS: 100 INJECTION, SOLUTION SUBCUTANEOUS at 21:30

## 2024-01-14 RX ADMIN — VANCOMYCIN HYDROCHLORIDE 125 MG: 125 CAPSULE ORAL at 17:10

## 2024-01-14 RX ADMIN — LISINOPRIL 40 MG: 40 TABLET ORAL at 18:03

## 2024-01-14 RX ADMIN — CASTOR OIL AND BALSAM, PERU 1 APPLICATION: 788; 87 OINTMENT TOPICAL at 09:35

## 2024-01-14 RX ADMIN — VANCOMYCIN HYDROCHLORIDE 125 MG: 125 CAPSULE ORAL at 13:20

## 2024-01-14 RX ADMIN — DULOXETINE HYDROCHLORIDE 30 MG: 30 CAPSULE, DELAYED RELEASE ORAL at 09:35

## 2024-01-14 RX ADMIN — ACETAMINOPHEN 650 MG: 325 TABLET, FILM COATED ORAL at 00:40

## 2024-01-14 RX ADMIN — PREGABALIN 50 MG: 25 CAPSULE ORAL at 21:29

## 2024-01-14 RX ADMIN — REMDESIVIR 100 MG: 100 INJECTION, POWDER, LYOPHILIZED, FOR SOLUTION INTRAVENOUS at 00:45

## 2024-01-14 RX ADMIN — INSULIN LISPRO 2 UNITS: 100 INJECTION, SOLUTION INTRAVENOUS; SUBCUTANEOUS at 21:30

## 2024-01-14 RX ADMIN — INSULIN LISPRO 5 UNITS: 100 INJECTION, SOLUTION INTRAVENOUS; SUBCUTANEOUS at 13:20

## 2024-01-14 RX ADMIN — INSULIN LISPRO 5 UNITS: 100 INJECTION, SOLUTION INTRAVENOUS; SUBCUTANEOUS at 17:10

## 2024-01-14 RX ADMIN — PREGABALIN 50 MG: 25 CAPSULE ORAL at 17:09

## 2024-01-14 RX ADMIN — ONDANSETRON 4 MG: 2 INJECTION INTRAMUSCULAR; INTRAVENOUS at 13:20

## 2024-01-14 RX ADMIN — PREGABALIN 50 MG: 25 CAPSULE ORAL at 00:39

## 2024-01-14 RX ADMIN — INSULIN LISPRO 4 UNITS: 100 INJECTION, SOLUTION INTRAVENOUS; SUBCUTANEOUS at 13:21

## 2024-01-14 RX ADMIN — LEVOTHYROXINE SODIUM 168 MCG: 112 TABLET ORAL at 13:20

## 2024-01-14 RX ADMIN — AMLODIPINE BESYLATE 10 MG: 10 TABLET ORAL at 09:35

## 2024-01-14 RX ADMIN — VANCOMYCIN HYDROCHLORIDE 125 MG: 125 CAPSULE ORAL at 00:40

## 2024-01-14 NOTE — PLAN OF CARE
Goal Outcome Evaluation:      A/O, VSS, Remdesivir and vanc continued, foot dressings changed, two bowel movements, incontinence care provided.

## 2024-01-14 NOTE — PROGRESS NOTES
Name: Halie Guidry ADMIT: 2024   : 1940  PCP: Napoleon Mead MD    MRN: [id number] LOS: 4 days   AGE/SEX: 83 y.o. female  ROOM: [facility]     Subjective   Subjective     No events overnight. Her blood sugar was low this morning. She didn't feel it.        Objective   Objective   Vital Signs  Temp:  [98.1 °F (36.7 °C)-98.6 °F (37 °C)] 98.6 °F (37 °C)  Heart Rate:  [84] 84  Resp:  [17-20] 20  BP: (156-181)/(73-93) 167/73  SpO2:  [93 %-94 %] 94 %  on   ;   Device (Oxygen Therapy): room air  Body mass index is 29.84 kg/m².  Physical Exam  Constitutional:       General: She is not in acute distress.     Appearance: She is not toxic-appearing.   Cardiovascular:      Rate and Rhythm: Normal rate and regular rhythm.      Heart sounds: Normal heart sounds.   Pulmonary:      Effort: Pulmonary effort is normal.      Breath sounds: Normal breath sounds.   Abdominal:      General: Bowel sounds are normal.      Palpations: Abdomen is soft.   Musculoskeletal:         General: Tenderness present.      Right lower leg: No edema.      Left lower leg: No edema.   Neurological:      Mental Status: She is alert.         Results Review     I reviewed the patient's new clinical results.  Results from last 7 days   Lab Units 24  0720 24  0619 01/10/24  0854 24  1758   WBC 10*3/mm3 15.88* 18.08* 16.06* 19.14*   HEMOGLOBIN g/dL 10.9* 11.8* 10.5* 11.5*   PLATELETS 10*3/mm3 319 324 350 351     Results from last 7 days   Lab Units 24  0719 24  0619 01/10/24  0854 24  1758   SODIUM mmol/L 143 144 142 139   POTASSIUM mmol/L 3.4* 3.9 3.9 4.0   CHLORIDE mmol/L 112* 112* 112* 110*   CO2 mmol/L 20.0* 19.0* 14.3* 15.8*   BUN mg/dL 24* 31* 41* 57*   CREATININE mg/dL 1.17* 1.40* 1.45* 2.03*   GLUCOSE mg/dL 212* 113* 146* 212*   Estimated Creatinine Clearance: 42.5 mL/min (A) (by C-G formula based on SCr of 1.17 mg/dL (H)).  Results from last 7 days   Lab Units 24  0719 24  0619  01/10/24  0854 01/09/24  1758   ALBUMIN g/dL 2.8* 3.3* 2.9* 3.1*   BILIRUBIN mg/dL 0.2 0.2 0.2 0.2   ALK PHOS U/L 82 93 95 94   AST (SGOT) U/L 16 22 22 24   ALT (SGPT) U/L 10 11 11 11     Results from last 7 days   Lab Units 01/12/24  0719 01/11/24  0619 01/10/24  0854 01/09/24  1758   CALCIUM mg/dL 8.8 8.9 8.4* 8.4*   ALBUMIN g/dL 2.8* 3.3* 2.9* 3.1*   MAGNESIUM mg/dL  --  1.6 2.0  --      Results from last 7 days   Lab Units 01/12/24  0719 01/10/24  0854 01/09/24  1758   PROCALCITONIN ng/mL 2.20* 9.71* 17.40*   LACTATE mmol/L  --   --  0.9     COVID19   Date Value Ref Range Status   01/09/2024 Detected (C) Not Detected - Ref. Range Final   05/13/2023 Not Detected Not Detected - Ref. Range Final   11/03/2022 Not Detected Not Detected - Ref. Range Final   06/22/2022 Not Detected Not Detected - Ref. Range Final   05/26/2022 Not Detected Not Detected - Ref. Range Final     Glucose   Date/Time Value Ref Range Status   01/14/2024 1056 281 (H) 70 - 130 mg/dL Final   01/14/2024 0733 116 70 - 130 mg/dL Final   01/14/2024 0705 66 (L) 70 - 130 mg/dL Final   01/14/2024 0632 58 (L) 70 - 130 mg/dL Final   01/13/2024 2054 247 (H) 70 - 130 mg/dL Final   01/13/2024 1628 267 (H) 70 - 130 mg/dL Final   01/13/2024 1128 210 (H) 70 - 130 mg/dL Final       CT Head Without Contrast  Narrative: CT OF THE HEAD WITHOUT CONTRAST     HISTORY: Mental status changes     COMPARISON: November 4, 2022     TECHNIQUE: Axial CT imaging was obtained through the brain. No IV  contrast was administered.     FINDINGS:  No acute intracranial hemorrhage is seen. There is diffuse atrophy.  There is periventricular and deep white matter microangiopathic change.  There is no midline shift or mass effect. 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 Mucosal thickening is noted within  the paranasal sinuses.     Impression: No acute intracranial findings.     Radiation dose reduction techniques were utilized, including  automated  exposure control and exposure modulation based on body size.        This report was finalized on 1/11/2024 12:45 AM by Dr. Kristina Forman M.D on Workstation: [id number]       Scheduled Medications  amLODIPine, 10 mg, Oral, Daily  atorvastatin, 80 mg, Oral, Nightly  bisoprolol, 5 mg, Oral, Daily  castor oil-balsam peru, 1 application , Topical, Daily  DULoxetine, 30 mg, Oral, Daily  enoxaparin, 40 mg, Subcutaneous, Q24H  furosemide, 20 mg, Oral, Daily  insulin glargine, 25 Units, Subcutaneous, BID  insulin lispro, 2-7 Units, Subcutaneous, 4x Daily AC & at Bedtime  insulin lispro, 5 Units, Subcutaneous, TID With Meals  levothyroxine, 112 mcg, Oral, Once per day on Monday Tuesday Wednesday Thursday Friday Saturday  levothyroxine, 168 mcg, Oral, Every Sunday  lisinopril, 40 mg, Oral, Daily  melatonin, 3 mg, Oral, Nightly  pregabalin, 50 mg, Oral, TID  remdesivir, 100 mg, Intravenous, Q24H  vancomycin, 125 mg, Oral, Q6H    Infusions  Pharmacy Consult,     Diet  Diet: Cardiac Diets, Diabetic Diets; Healthy Heart (2-3 Na+); Consistent Carbohydrate; Texture: Regular Texture (IDDSI 7); Fluid Consistency: Thin (IDDSI 0)       Assessment/Plan     Active Hospital Problems    Diagnosis  POA   • **VIPUL (acute kidney injury) [N17.9]  Yes   • COVID-19 [U07.1]  Yes   • Diabetic foot ulcer [E11.621, L97.509]  Yes   • Primary malignant neuroendocrine tumor of pancreas [C7A.8]  Yes   • Anxiety disorder [F41.9]  Yes   • COVID-19 virus detected [U07.1]  Yes   • Hyperlipidemia [E78.5]  Yes   • Primary hypothyroidism [E03.9]  Yes   • Type 2 diabetes mellitus with hyperglycemia, with long-term current use of insulin [E11.65, Z79.4]  Not Applicable   • Benign essential hypertension [I10]  Yes   • Stage 3a chronic kidney disease [N18.31]  Yes      Resolved Hospital Problems   No resolved problems to display.       83 y.o. female admitted with VIPUL (acute kidney injury).    Ms. Guidry is a 83 y.o. former smoker with a history  of hypertension, diabetes mellitus type 2, hyperlipidemia, obesity, C. difficile colitis that presents to [facility] complaining of abnormal lab.     VIPUL on CKD stage IIIa with metabolic acidosis-resolved with IVF.   COVID-19 without hypoxemia-completing 5 days of remdesivir today  Type 2 diabetes mellitus with hyperglycemia-reduce lantus dose to 25 units and add some scheduled meal time inulin. Continue sliding scale  C. Diff-on oral vancomycin with plans for 10 days of therapy  Hyperlipidemia-statin  Benign essential hypertension-restart some of her bp meds today  Hypothyroidism-Levothyroxine   Primary malignant neuroendocrine tumor of pancreas-Follows with oncology as outpatient and receiving monthly octreotide injections.  Recent history of infected Diabetic foot ulcer-Status post IV cefepime completed on 1/3/2024.  Anxiety  Lovenox 40 mg SC daily for DVT prophylaxis.  Full code.  Discussed with patient and nursing staff.  Anticipate discharge to SNU facility once arrangements have been made.      Kain Morgan MD  [facility]  01/14/24  15:54 EST    I wore protective equipment throughout this patient encounter including a face mask, gloves and protective eyewear.  Hand hygiene was performed before donning protective equipment and after removal when leaving the room.

## 2024-01-15 VITALS
SYSTOLIC BLOOD PRESSURE: 159 MMHG | HEIGHT: 68 IN | TEMPERATURE: 97.7 F | WEIGHT: 196.21 LBS | DIASTOLIC BLOOD PRESSURE: 79 MMHG | BODY MASS INDEX: 29.74 KG/M2 | OXYGEN SATURATION: 93 % | HEART RATE: 62 BPM | RESPIRATION RATE: 18 BRPM

## 2024-01-15 PROBLEM — A04.72 C. DIFFICILE DIARRHEA: Status: RESOLVED | Noted: 2024-01-15 | Resolved: 2024-01-15

## 2024-01-15 PROBLEM — N17.9 AKI (ACUTE KIDNEY INJURY): Status: RESOLVED | Noted: 2021-12-02 | Resolved: 2024-01-15

## 2024-01-15 PROBLEM — U07.1 COVID-19 VIRUS DETECTED: Status: RESOLVED | Noted: 2022-02-23 | Resolved: 2024-01-15

## 2024-01-15 PROBLEM — D89.831 CYTOKINE RELEASE SYNDROME, GRADE 1: Status: ACTIVE | Noted: 2024-01-15

## 2024-01-15 PROBLEM — D89.831 CYTOKINE RELEASE SYNDROME, GRADE 1: Status: RESOLVED | Noted: 2024-01-15 | Resolved: 2024-01-15

## 2024-01-15 PROBLEM — U07.1 COVID-19: Status: RESOLVED | Noted: 2024-01-10 | Resolved: 2024-01-15

## 2024-01-15 PROBLEM — A04.72 C. DIFFICILE DIARRHEA: Status: ACTIVE | Noted: 2024-01-15

## 2024-01-15 LAB
ANION GAP SERPL CALCULATED.3IONS-SCNC: 11.6 MMOL/L (ref 5–15)
BUN SERPL-MCNC: 18 MG/DL (ref 8–23)
BUN/CREAT SERPL: 18.6 (ref 7–25)
CALCIUM SPEC-SCNC: 8.3 MG/DL (ref 8.6–10.5)
CHLORIDE SERPL-SCNC: 107 MMOL/L (ref 98–107)
CO2 SERPL-SCNC: 21.4 MMOL/L (ref 22–29)
CREAT SERPL-MCNC: 0.97 MG/DL (ref 0.57–1)
DEPRECATED RDW RBC AUTO: 45.9 FL (ref 37–54)
EGFRCR SERPLBLD CKD-EPI 2021: 58.1 ML/MIN/1.73
ERYTHROCYTE [DISTWIDTH] IN BLOOD BY AUTOMATED COUNT: 15.5 % (ref 12.3–15.4)
GLUCOSE BLDC GLUCOMTR-MCNC: 180 MG/DL (ref 70–130)
GLUCOSE BLDC GLUCOMTR-MCNC: 192 MG/DL (ref 70–130)
GLUCOSE SERPL-MCNC: 187 MG/DL (ref 65–99)
HCT VFR BLD AUTO: 34.3 % (ref 34–46.6)
HGB BLD-MCNC: 11.3 G/DL (ref 12–15.9)
MCH RBC QN AUTO: 27.1 PG (ref 26.6–33)
MCHC RBC AUTO-ENTMCNC: 32.9 G/DL (ref 31.5–35.7)
MCV RBC AUTO: 82.3 FL (ref 79–97)
PLATELET # BLD AUTO: 320 10*3/MM3 (ref 140–450)
PMV BLD AUTO: 9.7 FL (ref 6–12)
POTASSIUM SERPL-SCNC: 4.1 MMOL/L (ref 3.5–5.2)
RBC # BLD AUTO: 4.17 10*6/MM3 (ref 3.77–5.28)
SODIUM SERPL-SCNC: 140 MMOL/L (ref 136–145)
WBC NRBC COR # BLD AUTO: 13.16 10*3/MM3 (ref 3.4–10.8)

## 2024-01-15 PROCEDURE — 63710000001 INSULIN LISPRO (HUMAN) PER 5 UNITS: Performed by: STUDENT IN AN ORGANIZED HEALTH CARE EDUCATION/TRAINING PROGRAM

## 2024-01-15 PROCEDURE — 97530 THERAPEUTIC ACTIVITIES: CPT

## 2024-01-15 PROCEDURE — 63710000001 INSULIN GLARGINE PER 5 UNITS: Performed by: STUDENT IN AN ORGANIZED HEALTH CARE EDUCATION/TRAINING PROGRAM

## 2024-01-15 PROCEDURE — 82948 REAGENT STRIP/BLOOD GLUCOSE: CPT

## 2024-01-15 PROCEDURE — 85027 COMPLETE CBC AUTOMATED: CPT | Performed by: STUDENT IN AN ORGANIZED HEALTH CARE EDUCATION/TRAINING PROGRAM

## 2024-01-15 PROCEDURE — 80048 BASIC METABOLIC PNL TOTAL CA: CPT | Performed by: STUDENT IN AN ORGANIZED HEALTH CARE EDUCATION/TRAINING PROGRAM

## 2024-01-15 PROCEDURE — 63710000001 INSULIN LISPRO (HUMAN) PER 5 UNITS: Performed by: NURSE PRACTITIONER

## 2024-01-15 RX ORDER — CASTOR OIL AND BALSAM, PERU 788; 87 MG/G; MG/G
1 OINTMENT TOPICAL DAILY
Status: ON HOLD
Start: 2024-01-16

## 2024-01-15 RX ORDER — HYDROCODONE BITARTRATE AND ACETAMINOPHEN 10; 325 MG/1; MG/1
1 TABLET ORAL 3 TIMES DAILY
Qty: 9 TABLET | Refills: 0 | Status: ON HOLD | OUTPATIENT
Start: 2024-01-15

## 2024-01-15 RX ORDER — PREGABALIN 50 MG/1
50 CAPSULE ORAL 3 TIMES DAILY
Qty: 9 CAPSULE | Refills: 0 | Status: ON HOLD | OUTPATIENT
Start: 2024-01-15

## 2024-01-15 RX ORDER — INSULIN LISPRO 100 [IU]/ML
5 INJECTION, SOLUTION INTRAVENOUS; SUBCUTANEOUS
Status: ON HOLD
Start: 2024-01-15

## 2024-01-15 RX ORDER — VANCOMYCIN HYDROCHLORIDE 125 MG/1
125 CAPSULE ORAL EVERY 6 HOURS SCHEDULED
Qty: 1 CAPSULE | Refills: 0 | Status: SHIPPED | OUTPATIENT
Start: 2024-01-15 | End: 2024-01-16

## 2024-01-15 RX ORDER — NALOXONE HYDROCHLORIDE 4 MG/.1ML
SPRAY NASAL
Qty: 2 EACH | Refills: 0 | Status: ON HOLD | OUTPATIENT
Start: 2024-01-15

## 2024-01-15 RX ORDER — HYDROCODONE BITARTRATE AND ACETAMINOPHEN 10; 325 MG/1; MG/1
1 TABLET ORAL 3 TIMES DAILY
Status: DISCONTINUED | OUTPATIENT
Start: 2024-01-15 | End: 2024-01-15 | Stop reason: HOSPADM

## 2024-01-15 RX ADMIN — BISOPROLOL FUMARATE 5 MG: 5 TABLET, FILM COATED ORAL at 08:25

## 2024-01-15 RX ADMIN — LISINOPRIL 40 MG: 40 TABLET ORAL at 08:24

## 2024-01-15 RX ADMIN — INSULIN LISPRO 2 UNITS: 100 INJECTION, SOLUTION INTRAVENOUS; SUBCUTANEOUS at 12:13

## 2024-01-15 RX ADMIN — VANCOMYCIN HYDROCHLORIDE 125 MG: 125 CAPSULE ORAL at 12:12

## 2024-01-15 RX ADMIN — FUROSEMIDE 20 MG: 20 TABLET ORAL at 08:25

## 2024-01-15 RX ADMIN — DULOXETINE HYDROCHLORIDE 30 MG: 30 CAPSULE, DELAYED RELEASE ORAL at 08:24

## 2024-01-15 RX ADMIN — HYDROCODONE BITARTRATE AND ACETAMINOPHEN 1 TABLET: 10; 325 TABLET ORAL at 12:12

## 2024-01-15 RX ADMIN — LEVOTHYROXINE SODIUM 112 MCG: 112 TABLET ORAL at 05:34

## 2024-01-15 RX ADMIN — INSULIN LISPRO 2 UNITS: 100 INJECTION, SOLUTION INTRAVENOUS; SUBCUTANEOUS at 08:26

## 2024-01-15 RX ADMIN — INSULIN GLARGINE 25 UNITS: 100 INJECTION, SOLUTION SUBCUTANEOUS at 08:25

## 2024-01-15 RX ADMIN — INSULIN LISPRO 5 UNITS: 100 INJECTION, SOLUTION INTRAVENOUS; SUBCUTANEOUS at 12:13

## 2024-01-15 RX ADMIN — AMLODIPINE BESYLATE 10 MG: 10 TABLET ORAL at 08:25

## 2024-01-15 RX ADMIN — INSULIN LISPRO 5 UNITS: 100 INJECTION, SOLUTION INTRAVENOUS; SUBCUTANEOUS at 08:26

## 2024-01-15 RX ADMIN — VANCOMYCIN HYDROCHLORIDE 125 MG: 125 CAPSULE ORAL at 00:27

## 2024-01-15 RX ADMIN — PREGABALIN 50 MG: 25 CAPSULE ORAL at 08:25

## 2024-01-15 RX ADMIN — VANCOMYCIN HYDROCHLORIDE 125 MG: 125 CAPSULE ORAL at 05:35

## 2024-01-15 RX ADMIN — ACETAMINOPHEN 650 MG: 325 TABLET, FILM COATED ORAL at 08:31

## 2024-01-15 RX ADMIN — CASTOR OIL AND BALSAM, PERU 1 APPLICATION: 788; 87 OINTMENT TOPICAL at 08:26

## 2024-01-15 NOTE — PROGRESS NOTES
Continued Stay Note  [facility]     Patient Name: Halie Guidry  MRN: [id number]  Today's Date: 1/15/2024    Admit Date: 1/9/2024    [facility]   Discharge Plan       Row Name 01/15/24 1427       Plan    Plan [facility]    Patient/Family in Agreement with Plan yes    Plan Comments Patient has pre-cert for [facility]  [facility] bed available.  [facility] scheduled for 3:00 p.m. Spoke with son Derrick and he is aware/agreeable.  Packet to [facility].  Connor MILLER                         Expected Discharge Date and Time       Expected Discharge Date Expected Discharge Time    Manuel 15, 2024               Becky S. [place], RN

## 2024-01-15 NOTE — DISCHARGE SUMMARY
Patient Name: Halie Guidry  : 1940  MRN: [id number]    Date of Admission: 2024  Date of Discharge:  1/15/2024  Primary Care Physician: Napoleon Mead MD      Chief Complaint:   Abnormal Lab      Discharge Diagnoses     Active Hospital Problems    Diagnosis  POA   • Diabetic foot ulcer [E11.621, L97.509]  Yes   • Primary malignant neuroendocrine tumor of pancreas [C7A.8]  Yes   • Anxiety disorder [F41.9]  Yes   • Hyperlipidemia [E78.5]  Yes   • Primary hypothyroidism [E03.9]  Yes   • Type 2 diabetes mellitus with hyperglycemia, with long-term current use of insulin [E11.65, Z79.4]  Not Applicable   • Benign essential hypertension [I10]  Yes   • Stage 3a chronic kidney disease [N18.31]  Yes      Resolved Hospital Problems    Diagnosis Date Resolved POA   • **VIPUL (acute kidney injury) [N17.9] 01/15/2024 Yes   • Cytokine release syndrome, grade 1 [D89.831] 01/15/2024 No   • C. difficile diarrhea [A04.72] 01/15/2024 Yes   • COVID-19 [U07.1] 01/15/2024 Yes   • COVID-19 virus detected [U07.1] 01/15/2024 Yes        Hospital Course     Ms. Guidry is a 83 y.o. female with a history of CKD 3a, type 2 diabetes on insulin, essential hypertension, hyperlipidemia, hypothyroidism, a neuroendocrine tumor of the pancreas on octreotide injections with oncology, anxiety, a recent admission for an infected diabetic foot ulcer s/p a course of IV cefepime which was completed on 1/3/24 who presented to [facility] initially from her nursing facility with an elevated wbc and positive tests for covid-19 and c diff (she had been started on oral vancomycin prior to admission).  Please see the admitting history and physical for further details.  She was found to have covid-19 without hypoxia, c diff diarrhea, and an vipul on CKD 3a and was admitted to the hospital for further evaluation and treatment.      She was seen in consultation by infectious disease and started on IVF, remdesivir, and oral  vancomycin. Her VIPUL improved with fluids and her diarrhea has slowed significantly with oral vancomycin. She completed a 5 day course of remdesivir. ID recommended a 10 day course of oral vancomycin and she has a single dose remains to complete this therapy.     Her course was complicated by some intermittent hypoglycemia despite being on a lower dose of lantus here than at home. I did adjust her lantus regimen down and added scheduled and sliding scale insulin. She will probably need to continue to titrate up to get her blood sugars closer to goal in the outpatient setting.     Day of Discharge     Subjective:  No events overnight. No complaints. No further hypoglycemia today    Physical Exam:  Temp:  [98.4 °F (36.9 °C)-98.6 °F (37 °C)] 98.4 °F (36.9 °C)  Heart Rate:  [62-71] 62  Resp:  [18-20] 18  BP: (143-167)/(59-73) 147/59  Body mass index is 29.84 kg/m².  Physical Exam  Constitutional:       General: She is not in acute distress.     Appearance: She is not toxic-appearing.   Cardiovascular:      Rate and Rhythm: Normal rate and regular rhythm.      Pulses: Normal pulses.      Heart sounds: Normal heart sounds.   Pulmonary:      Effort: Pulmonary effort is normal.      Breath sounds: Normal breath sounds.   Abdominal:      General: Bowel sounds are normal.      Palpations: Abdomen is soft.   Neurological:      Mental Status: She is alert.   Psychiatric:         Mood and Affect: Mood normal.         Behavior: Behavior normal.         Consultants     Consult Orders (all) (From admission, onward)       Start     Ordered    01/10/24 0800  Inpatient Infectious Diseases Consult  Once        Specialty:  Infectious Diseases  Provider:  Lei Rodriguez MD    01/10/24 0759    01/10/24 0505  Inpatient Case Management  Consult  Once        Provider:  (Not yet assigned)    01/10/24 0505    01/10/24 0505  Inpatient Spiritual Care Consult  Once        Provider:  (Not yet assigned)    01/10/24 0505     01/10/24 0505  Inpatient Diabetes Educator Consult  Once,   Status:  Canceled        Provider:  (Not yet assigned)    01/10/24 0505    01/09/24 1917  [facility] (on-call MD unless specified) Details  Once        Specialty:  Hospitalist  Provider:  (Not yet assigned)    01/09/24 1916                  Procedures     Imaging Results (All)       Procedure Component Value Units Date/Time    CT Head Without Contrast [[id number]] Collected: 01/11/24 0043     Updated: 01/11/24 0048    Narrative:      CT OF THE HEAD WITHOUT CONTRAST     HISTORY: Mental status changes     COMPARISON: November 4, 2022     TECHNIQUE: Axial CT imaging was obtained through the brain. No IV  contrast was administered.     FINDINGS:  No acute intracranial hemorrhage is seen. There is diffuse atrophy.  There is periventricular and deep white matter microangiopathic change.  There is no midline shift or mass effect. 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 Mucosal thickening is noted within  the paranasal sinuses.       Impression:      No acute intracranial findings.     Radiation dose reduction techniques were utilized, including automated  exposure control and exposure modulation based on body size.        This report was finalized on 1/11/2024 12:45 AM by Dr. Kristina Forman M.D on Workstation: [id number]       XR Chest 1 View [[id number]] Collected: 01/09/24 1818     Updated: 01/09/24 1829    Narrative:      XR CHEST 1 VW-1/9/2024     HISTORY: Leukocytosis.     Heart size is within normal limits. Lungs appear free of acute  infiltrates. There is some aortic calcification. There are degenerative  changes of the shoulders left greater than right.       Impression:      1. No acute process.        This report was finalized on 1/9/2024 6:26 PM by Dr. Mario Alberto Moura M.D on Workstation: [id number]               Pertinent Labs     Results from last 7 days   Lab Units 01/15/24  0552 01/12/24  0720 01/11/24  0619  "01/10/24  0854   WBC 10*3/mm3 13.16* 15.88* 18.08* 16.06*   HEMOGLOBIN g/dL 11.3* 10.9* 11.8* 10.5*   PLATELETS 10*3/mm3 320 319 324 350     Results from last 7 days   Lab Units 01/15/24  0552 01/12/24  0719 01/11/24  0619 01/10/24  0854   SODIUM mmol/L 140 143 144 142   POTASSIUM mmol/L 4.1 3.4* 3.9 3.9   CHLORIDE mmol/L 107 112* 112* 112*   CO2 mmol/L 21.4* 20.0* 19.0* 14.3*   BUN mg/dL 18 24* 31* 41*   CREATININE mg/dL 0.97 1.17* 1.40* 1.45*   GLUCOSE mg/dL 187* 212* 113* 146*   Estimated Creatinine Clearance: 51.3 mL/min (by C-G formula based on SCr of 0.97 mg/dL).  Results from last 7 days   Lab Units 01/12/24  0719 01/11/24  0619 01/10/24  0854 01/09/24  1758   ALBUMIN g/dL 2.8* 3.3* 2.9* 3.1*   BILIRUBIN mg/dL 0.2 0.2 0.2 0.2   ALK PHOS U/L 82 93 95 94   AST (SGOT) U/L 16 22 22 24   ALT (SGPT) U/L 10 11 11 11     Results from last 7 days   Lab Units 01/15/24  0552 01/12/24  0719 01/11/24  0619 01/10/24  0854 01/09/24  1758   CALCIUM mg/dL 8.3* 8.8 8.9 8.4* 8.4*   ALBUMIN g/dL  --  2.8* 3.3* 2.9* 3.1*   MAGNESIUM mg/dL  --   --  1.6 2.0  --                Invalid input(s): \"LDLCALC\"  Results from last 7 days   Lab Units 01/09/24  1928 01/09/24  1816   BLOODCX  No growth at 5 days No growth at 5 days       Test Results Pending at Discharge       Discharge Details        Discharge Medications        New Medications        Instructions Start Date   cadexomer iodine 0.9 % gel  Commonly known as: IODOSORB   1 application , Topical, Daily PRN      castor oil-balsam peru ointment   1 application , Topical, Daily   Start Date: January 16, 2024     naloxone 4 MG/0.1ML nasal spray  Commonly known as: NARCAN   Call 911. Don't prime. Christine in 1 nostril for overdose. Repeat in 2-3 minutes in other nostril if no or minimal breathing/responsiveness.             Changes to Medications        Instructions Start Date   Insulin Glargine (1 Unit Dial) 300 UNIT/ML solution pen-injector injection  Commonly known as: TOUJEO  What " changed:   how much to take  when to take this   25 Units, Subcutaneous, 2 Times Daily      insulin lispro 100 UNIT/ML injection  Commonly known as: HUMALOG/ADMELOG  What changed:   how much to take  when to take this  additional instructions  Another medication with the same name was removed. Continue taking this medication, and follow the directions you see here.   2-7 Units, Subcutaneous, 4 Times Daily Before Meals & Nightly      insulin lispro 100 UNIT/ML injection  Commonly known as: HUMALOG/ADMELOG  What changed: You were already taking a medication with the same name, and this prescription was added. Make sure you understand how and when to take each.  Replaces: HumaLOG KwikPen 100 UNIT/ML solution pen-injector   5 Units, Subcutaneous, 3 Times Daily With Meals      levothyroxine 112 MCG tablet  Commonly known as: SYNTHROID, LEVOTHROID  What changed: Another medication with the same name was changed. Make sure you understand how and when to take each.   1.5 tablets, Oral, Weekly, Take on Sunday      levothyroxine 112 MCG tablet  Commonly known as: SYNTHROID, LEVOTHROID  What changed:   how much to take  how to take this  when to take this  additional instructions   levothyroxine 112 mcg 1 tablet daily, except on Sunday take 1.5 tablets.      vancomycin 125 MG capsule  Commonly known as: VANCOCIN  What changed:   when to take this  additional instructions   125 mg, Oral, Every 6 Hours Scheduled             Continue These Medications        Instructions Start Date   amLODIPine 10 MG tablet  Commonly known as: NORVASC   10 mg, Oral, Daily      atorvastatin 80 MG tablet  Commonly known as: LIPITOR   TAKE 1 TABLET EVERY NIGHT      BD Pen Needle Naila 2nd Gen 32G X 4 MM misc  Generic drug: Insulin Pen Needle   USE TO INJECT INSULIN FOUR TIMES DAILY      bisoprolol 5 MG tablet  Commonly known as: ZEBeta   5 mg, Oral, Daily      CertaVite Senior tablet tablet   1 tablet, Oral, Daily      cyanocobalamin 1000 MCG  tablet  Commonly known as: VITAMIN B-12   1,000 mcg, Oral, Daily      dicyclomine 20 MG tablet  Commonly known as: BENTYL   1 tablet, Oral, 3 Times Daily PRN      DULoxetine 30 MG capsule  Commonly known as: CYMBALTA   30 mg, Oral, Daily      furosemide 20 MG tablet  Commonly known as: LASIX   20 mg, Oral, Daily      HYDROcodone-acetaminophen  MG per tablet  Commonly known as: NORCO   1 tablet, Oral, 3 Times Daily      hydrocortisone-zinc oxide-bacitracin-nystatin cream   1 application , Topical, 2 Times Daily PRN      lansoprazole 30 MG capsule  Commonly known as: PREVACID   30 mg, Oral, Daily      lisinopril 40 MG tablet  Commonly known as: PRINIVIL,ZESTRIL   40 mg, Oral, Daily      pregabalin 50 MG capsule  Commonly known as: LYRICA   50 mg, Oral, 3 Times Daily             Stop These Medications      chlorthalidone 25 MG tablet  Commonly known as: HYGROTON     doxazosin 1 MG tablet  Commonly known as: CARDURA     HumaLOG KwikPen 100 UNIT/ML solution pen-injector  Generic drug: Insulin Lispro (1 Unit Dial)  Replaced by: insulin lispro 100 UNIT/ML injection  You also have another medication with the same name that you need to continue taking as instructed.     lactobacillus acidophilus capsule capsule              Allergies   Allergen Reactions   • Sulfa Antibiotics Unknown - High Severity     Pt says it was a long time ago and I don't remember but it wasn't good.          Discharge Disposition:  Skilled Nursing Facility (DC - External)    Discharge Diet:  Diet Order   Procedures   • Diet: Cardiac Diets, Diabetic Diets; Healthy Heart (2-3 Na+); Consistent Carbohydrate; Texture: Regular Texture (IDDSI 7); Fluid Consistency: Thin (IDDSI 0)       Discharge Activity:   Activity Instructions       Activity as Tolerated              CODE STATUS:    Code Status and Medical Interventions:   Ordered at: 01/09/24 2002     Code Status (Patient has no pulse and is not breathing):    CPR (Attempt to Resuscitate)      Medical Interventions (Patient has pulse or is breathing):    Full Support       No future appointments.  Additional Instructions for the Follow-ups that You Need to Schedule       Call MD With Problems / Concerns   As directed      Instructions: return to the hospital if you experience chest pain, shortness of breath, abdominal pain, nausea, vomiting, fevers, sweats, chills, or worsening of your symptoms    Order Comments: Instructions: return to the hospital if you experience chest pain, shortness of breath, abdominal pain, nausea, vomiting, fevers, sweats, chills, or worsening of your symptoms         Discharge Follow-up with PCP   As directed       Currently Documented PCP:    Napoleon Meda MD    PCP Phone Number:    [phone]     Follow Up Details: 1-2 weeks               Follow-up Information       Napoleon Mead MD .    Specialty: Family Medicine  Why: 1-2 weeks  Contact information:  [address] [address]  [phone]                             Additional Instructions for the Follow-ups that You Need to Schedule       Call MD With Problems / Concerns   As directed      Instructions: return to the hospital if you experience chest pain, shortness of breath, abdominal pain, nausea, vomiting, fevers, sweats, chills, or worsening of your symptoms    Order Comments: Instructions: return to the hospital if you experience chest pain, shortness of breath, abdominal pain, nausea, vomiting, fevers, sweats, chills, or worsening of your symptoms         Discharge Follow-up with PCP   As directed       Currently Documented PCP:    Napoleon Mead MD    PCP Phone Number:    [phone]     Follow Up Details: 1-2 weeks            Time Spent on Discharge:  Greater than 30 minutes      Kain Morgan MD  [facility]  01/15/24  13:23 EST

## 2024-01-15 NOTE — PLAN OF CARE
Goal Outcome Evaluation:  Plan of Care Reviewed With: patient           Outcome Evaluation: Patient seen for PT session this AM. Patient supine in bed upon arrival. Patient alert and agreeable to PT. Patient sat up to EOB with CGA and increased time. Patient performed STS from EOB 2x with modA. Patient unsteady as she took a few shuffled steps from bed to chair with Jayson and support of rwx. Patient quick to fatigue. Patient would continue to benefit from skilled PT intervention to address deficits in functional mobility. PT will continue to monitor.

## 2024-01-15 NOTE — SIGNIFICANT NOTE
01/15/24 1021   Post Acute Pre-Cert Documentation   Request Submitted by Facility - Type: Post Acute  (PER MORENA BECKMAN PRE-CERT STARTED BY JENNIFER DICK)   Post-Acute Authorization Type Submitted: SNF   Accepting Facility [facility]   Hospital Discharge Date Requested 01/13/24   Response Communicated to:    Authorization Number: PENDING [id number]   Post Acute Pre-Cert Initiated Comment LUCY JUSTIN

## 2024-01-15 NOTE — PLAN OF CARE
Goal Outcome Evaluation:           Progress: improving  Outcome Evaluation: Complaints of pain treated with PRN tylenol. Remains in enhanced Isolation. A&Ox4. Room air. Pills whole with water.

## 2024-01-15 NOTE — SIGNIFICANT NOTE
01/15/24 1312   Post Acute Pre-Cert Documentation   Request Submitted by Facility - Type: Post Acute   Post-Acute Authorization Type Submitted: SNF   Date Post Acute Pre-Cert Completed 01/15/24   Accepting Facility [facility]   Hospital Discharge Date Requested 01/15/24   All Clinicals Submitted? Yes   Had Accepting Facility at Time of Submission Yes   Response Received from Insurance? Approval   Response Communicated to: ;Accepting Facility Liaison;Accepting Facility Auth Department   Authorization Number: [id number]   Post Acute Pre-Cert Initiated Comment LUCY JUSTIN

## 2024-01-15 NOTE — SIGNIFICANT NOTE
01/15/24 1202   Post Acute Pre-Cert Documentation   Response Received from Insurance? Additional Clinical Requested  (UPLOADED TODAY'S PT NOTES TO [facility] PER REP (SILKE) REQUEST.)   Authorization Number: PENDING [id number]   Post Acute Pre-Cert Initiated Comment LUCY JUSTIN

## 2024-01-15 NOTE — THERAPY TREATMENT NOTE
Patient Name: Halie Guidry  : 1940    MRN: [id number]                              Today's Date: 1/15/2024       Admit Date: 2024    Visit Dx:     ICD-10-CM ICD-9-CM   1. Acute renal insufficiency  N28.9 593.9   2. Leukocytosis, unspecified type  D72.829 288.60   3. C. difficile colitis  A04.72 008.45   4. COVID-19  U07.1 079.89     Patient Active Problem List   Diagnosis    Compression fracture    Lumbar degenerative disc disease    Type 2 diabetes mellitus with hyperglycemia, with long-term current use of insulin    Hyperlipidemia    Benign essential hypertension    Primary hypothyroidism    Neuropathy    Osteoporosis    Proteinuria    Tobacco abuse    Diabetic eye exam    Generalized weakness    Spinal stenosis    Scoliosis    Arthritis    VBI (vertebrobasilar insufficiency)    Orthostatic hypotension    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    Severe hypothyroidism    Noncompliance with medication regimen    Vitamin D deficiency disease    Altered mental status    Tremor    Seizure    Stage 3a chronic kidney disease    Thoracic degenerative disc disease    Metabolic encephalopathy    VIPUL (acute kidney injury)    Hyperglycemia    Type II diabetes mellitus, uncontrolled    Lower abdominal pain    Transaminitis    COVID-19 virus detected    UTI (urinary tract infection)    Emphysematous cystitis    Choledocholithiasis    Hypokalemia    Hypoxia    Generalized abdominal pain    History of Clostridium difficile infection    History of ERCP    Acute UTI (urinary tract infection)    Hypomagnesemia    Burning pain    Anxiety disorder    Neuropathic pain    Intertrigo    Weakness of both lower extremities    Accelerated hypertension    Acute cystitis without hematuria    Altered mental status, unspecified altered mental status type    Left lower lobe pneumonia    Acute pain of left foot    Cellulitis and abscess of left lower extremity    Hypertensive kidney disease with stage 3a  chronic kidney disease    Bilateral leg pain    Peripheral edema    Primary malignant neuroendocrine tumor of pancreas    Encounter for long-term (current) use of high-risk medication    Diabetic foot ulcer    Acute renal insufficiency    COVID-19     Past Medical History:   Diagnosis Date    Acute metabolic encephalopathy 6/24/2022    Anxiety     Arthritis     Benign essential hypertension 08/20/2014    Bleeding disorder     Depression     Diabetes     Diabetes mellitus, type 2     Disc degeneration, lumbar     Headache, tension-type     Hyperlipidemia     Hypothyroidism     Neuropathy     Osteoporosis 09/09/2015    Peripheral neuropathy     Rotator cuff tear, left     Scoliosis     Shoulder pain     LEFT, TORN ROTATOR CUFF S/P FALL    Spinal stenosis      Past Surgical History:   Procedure Laterality Date    APPENDECTOMY      BILATERAL BREAST REDUCTION Bilateral 08/2015    CATARACT EXTRACTION  03/2015    CHOLECYSTECTOMY WITH INTRAOPERATIVE CHOLANGIOGRAM N/A 3/27/2022    Procedure: CHOLECYSTECTOMY LAPAROSCOPIC INTRAOPERATIVE CHOLANGIOGRAM;  Surgeon: Aiyana Hill MD;  Location: [facility] OR;  Service: General;  Laterality: N/A;    COLONOSCOPY  06/05/2015    WNL    ERCP N/A 2/25/2022    Procedure: ENDOSCOPIC RETROGRADE CHOLANGIOPANCREATOGRAPHY with sphincterotomy and balloon sweep;  Surgeon: Chilo Wilhelm MD;  Location: [facility] ENDOSCOPY;  Service: Gastroenterology;  Laterality: N/A;  PRE/POST - CBD stones    ERCP N/A 3/28/2022    Procedure: ENDOSCOPIC RETROGRADE CHOLANGIOPANCREATOGRAPHY WITH SPHINCTEROTOMY AND BALLOON SWEEP;  Surgeon: Chilo Wilhelm MD;  Location: [facility] ENDOSCOPY;  Service: Gastroenterology;  Laterality: N/A;  PRE: COMMON DUCT STONE  POST: COMMON DUCT STONE    KYPHOPLASTY      REDUCTION MAMMAPLASTY      TONSILLECTOMY        General Information       Row Name 01/15/24 1038          Physical Therapy Time and Intention    Document Type therapy note (daily note)  -     Mode of  Treatment individual therapy;physical therapy  -       Row Name 01/15/24 1038          General Information    Patient Profile Reviewed yes  -     Existing Precautions/Restrictions fall  -       Row Name 01/15/24 1038          Cognition    Orientation Status (Cognition) oriented x 4  -[facility] Name 01/15/24 1038          Safety Issues, Functional Mobility    Impairments Affecting Function (Mobility) balance;endurance/activity tolerance;strength;pain  -               User Key  (r) = Recorded By, (t) = Taken By, (c) = Cosigned By      Initials Name Provider Type     Pao Jones PT Physical Therapist                   Mobility       Row Name 01/15/24 1054          Bed Mobility    Bed Mobility supine-sit  -     Supine-Sit Bassett (Bed Mobility) contact guard  -     Assistive Device (Bed Mobility) bed rails;head of bed elevated  -     Comment, (Bed Mobility) Increased time  -[facility] Name 01/15/24 1054          Sit-Stand Transfer    Sit-Stand Bassett (Transfers) moderate assist (50% patient effort);verbal cues  -     Assistive Device (Sit-Stand Transfers) walker, front-wheeled  -     Comment, (Sit-Stand Transfer) 2x from elevated EOB  -[facility] Name 01/15/24 1054          Gait/Stairs (Locomotion)    Bassett Level (Gait) minimum assist (75% patient effort);verbal cues  -     Assistive Device (Gait) walker, front-wheeled  -     Distance in Feet (Gait) 3ft to chair  -     Deviations/Abnormal Patterns (Gait) antalgic;tess decreased;gait speed decreased;stride length decreased  -     Bilateral Gait Deviations forward flexed posture;heel strike decreased  -     Comment, (Gait/Stairs) Steps slow and unsteady.  -               User Key  (r) = Recorded By, (t) = Taken By, (c) = Cosigned By      Initials Name Provider Type     Pao Jones PT Physical Therapist                   Obj/Interventions       Row Name 01/15/24 1055          Motor Skills     Therapeutic Exercise other (see comments)  LAQs x10  -       Row Name 01/15/24 1055          Balance    Balance Assessment sitting static balance;sitting dynamic balance;sit to stand dynamic balance;standing dynamic balance;standing static balance  -     Static Sitting Balance standby assist  -     Dynamic Sitting Balance contact guard  -     Position, Sitting Balance sitting edge of bed  -     Sit to Stand Dynamic Balance moderate assist;verbal cues  -     Static Standing Balance minimal assist  -     Dynamic Standing Balance minimal assist  -     Position/Device Used, Standing Balance supported;walker, front-wheeled  -     Balance Interventions sitting;standing;sit to stand;supported;static;dynamic  -               User Key  (r) = Recorded By, (t) = Taken By, (c) = Cosigned By      Initials Name Provider Type    Pao Locke PT Physical Therapist                   Goals/Plan    No documentation.                  Clinical Impression       Row Name 01/15/24 1056          Pain    Pretreatment Pain Rating 0/10 - no pain  -     Posttreatment Pain Rating 0/10 - no pain  -       Row Name 01/15/24 1056          Plan of Care Review    Plan of Care Reviewed With patient  -     Outcome Evaluation Patient seen for PT session this AM. Patient supine in bed upon arrival. Patient alert and agreeable to PT. Patient sat up to EOB with CGA and increased time. Patient performed STS from EOB 2x with modA. Patient unsteady as she took a few shuffled steps from bed to chair with Jayson and support of rwx. Patient quick to fatigue. Patient would continue to benefit from skilled PT intervention to address deficits in functional mobility. PT will continue to monitor.  -       Row Name 01/15/24 1056          Vital Signs    Pre Patient Position Supine  -     Intra Patient Position Standing  -     Post Patient Position Sitting  -       Row Name 01/15/24 1056          Positioning and Restraints     Pre-Treatment Position in bed  -SM     Post Treatment Position chair  -SM     In Chair notified nsg;reclined;call light within reach;encouraged to call for assist;exit alarm on  -SM               User Key  (r) = Recorded By, (t) = Taken By, (c) = Cosigned By      Initials Name Provider Type    Pao Locke PT Physical Therapist                   Outcome Measures       Row Name 01/15/24 1058          How much help from another person do you currently need...    Turning from your back to your side while in flat bed without using bedrails? 3  -SM     Moving from lying on back to sitting on the side of a flat bed without bedrails? 3  -SM     Moving to and from a bed to a chair (including a wheelchair)? 2  -SM     Standing up from a chair using your arms (e.g., wheelchair, bedside chair)? 2  -SM     Climbing 3-5 steps with a railing? 2  -SM     To walk in hospital room? 2  -SM     AM-PAC 6 Clicks Score (PT) 14  -SM     Highest Level of Mobility Goal 4 --> Transfer to chair/commode  -               User Key  (r) = Recorded By, (t) = Taken By, (c) = Cosigned By      Initials Name Provider Type    Pao Locke PT Physical Therapist                                 Physical Therapy Education       Title: PT OT SLP Therapies (Done)       Topic: Physical Therapy (Done)       Point: Mobility training (Done)       Learning Progress Summary             Patient Acceptance, E, VU,NR by  at 1/15/2024 1058    Acceptance, E,TB,D, VU,NR by  at 1/13/2024 1428    Acceptance, E, NR by  at 1/10/2024 0414                         Point: Home exercise program (Done)       Learning Progress Summary             Patient Acceptance, E, VU,NR by  at 1/15/2024 1058    Acceptance, E,TB,D, VU,NR by  at 1/13/2024 1428    Acceptance, E, NR by  at 1/10/2024 0414                         Point: Body mechanics (Done)       Learning Progress Summary             Patient Acceptance, E, VU,NR by  at 1/15/2024 1058     Acceptance, E,TB,D, VU,NR by  at 1/13/2024 1428    Acceptance, E, NR by  at 1/10/2024 0414                         Point: Precautions (Done)       Learning Progress Summary             Patient Acceptance, E, VU,NR by  at 1/15/2024 1058    Acceptance, E,TB,D, VU,NR by  at 1/13/2024 1428    Acceptance, E, NR by  at 1/10/2024 0414                                         User Key       Initials Effective Dates Name Provider Type Discipline     06/16/21 -  Deepika Triplett, RN Registered Nurse Nurse     04/08/22 -  Alia Owusu, PT Physical Therapist PT     05/02/22 -  Pao Jones PT Physical Therapist PT                  PT Recommendation and Plan     Plan of Care Reviewed With: patient  Outcome Evaluation: Patient seen for PT session this AM. Patient supine in bed upon arrival. Patient alert and agreeable to PT. Patient sat up to EOB with CGA and increased time. Patient performed STS from EOB 2x with modA. Patient unsteady as she took a few shuffled steps from bed to chair with Jayson and support of rwx. Patient quick to fatigue. Patient would continue to benefit from skilled PT intervention to address deficits in functional mobility. PT will continue to monitor.     Time Calculation:         PT Charges       Row Name 01/15/24 1058             Time Calculation    Start Time 1020  -      Stop Time 1035  -      Time Calculation (min) 15 min  -      PT Received On 01/15/24  -      PT - Next Appointment 01/17/24  -         Time Calculation- PT    Total Timed Code Minutes- PT 15 minute(s)  -         Timed Charges    70048 - PT Therapeutic Exercise Minutes 5  -SM      84938 - PT Therapeutic Activity Minutes 10  -SM         Total Minutes    Timed Charges Total Minutes 15  -SM       Total Minutes 15  -SM                User Key  (r) = Recorded By, (t) = Taken By, (c) = Cosigned By      Initials Name Provider Type     Pao Jones, MELODIE Physical Therapist                  Therapy  Charges for Today       Code Description Service Date Service Provider Modifiers Qty    61420317405 HC PT THERAPEUTIC ACT EA 15 MIN 1/15/2024 Pao Jones, PT GP 1            PT G-Codes  Outcome Measure Options: AM-PAC 6 Clicks Basic Mobility (PT)  AM-PAC 6 Clicks Score (PT): 14       Pao Jones PT  1/15/2024

## 2024-01-16 NOTE — PROGRESS NOTES
Case Management Discharge Note      Final Note: DC'd to skilled bed at [facility] via [facility] van 1/15    Provided Post Acute Provider List?: N/A  N/A Provider List Comment: Has been at [facility] and plans to return    Selected Continued Care - Discharged on 1/15/2024 Admission date: 1/9/2024 - Discharge disposition: Skilled Nursing Facility (DC - External)      Destination Coordination complete.      Service Provider Selected Services Address Phone Fax Patient Preferred    [facility] [address] [id number] [phone] --                            Selected Continued Care - Prior Encounters Includes continued care and service providers with selected services from prior encounters from 10/11/2023 to 1/15/2024      Discharged on 12/30/2023 Admission date: 12/24/2023 - Discharge disposition: Skilled Nursing Facility (DC - External)      Destination       Service Provider Selected Services Address Phone Fax Patient Preferred    [facility] [address] [address] [phone] --                          Transportation Services  [facility]    Final Discharge Disposition Code: 03 - skilled nursing facility (SNF)

## 2024-01-27 ENCOUNTER — HOSPITAL ENCOUNTER (INPATIENT)
Facility: HOSPITAL | Age: 84
LOS: 14 days | Discharge: NURSING, REHAB OR LONG-TERM CARE | DRG: 871 | End: 2024-02-10
Attending: STUDENT IN AN ORGANIZED HEALTH CARE EDUCATION/TRAINING PROGRAM | Admitting: INTERNAL MEDICINE
Payer: MEDICARE

## 2024-01-27 ENCOUNTER — APPOINTMENT (OUTPATIENT)
Dept: GENERAL RADIOLOGY | Facility: HOSPITAL | Age: 84
DRG: 871 | End: 2024-01-27
Payer: MEDICARE

## 2024-01-27 ENCOUNTER — APPOINTMENT (OUTPATIENT)
Dept: CT IMAGING | Facility: HOSPITAL | Age: 84
DRG: 871 | End: 2024-01-27
Payer: MEDICARE

## 2024-01-27 DIAGNOSIS — G93.40 ENCEPHALOPATHY: ICD-10-CM

## 2024-01-27 DIAGNOSIS — N17.9 SEPSIS WITH ACUTE RENAL FAILURE, DUE TO UNSPECIFIED ORGANISM, UNSPECIFIED ACUTE RENAL FAILURE TYPE, UNSPECIFIED WHETHER SEPTIC SHOCK PRESENT: Primary | ICD-10-CM

## 2024-01-27 DIAGNOSIS — A41.9 SEPSIS WITH ACUTE RENAL FAILURE, DUE TO UNSPECIFIED ORGANISM, UNSPECIFIED ACUTE RENAL FAILURE TYPE, UNSPECIFIED WHETHER SEPTIC SHOCK PRESENT: Primary | ICD-10-CM

## 2024-01-27 DIAGNOSIS — R65.20 SEPSIS WITH ACUTE RENAL FAILURE, DUE TO UNSPECIFIED ORGANISM, UNSPECIFIED ACUTE RENAL FAILURE TYPE, UNSPECIFIED WHETHER SEPTIC SHOCK PRESENT: Primary | ICD-10-CM

## 2024-01-27 DIAGNOSIS — G62.9 NEUROPATHY: ICD-10-CM

## 2024-01-27 LAB
ALBUMIN SERPL-MCNC: 3.1 G/DL (ref 3.5–5.2)
ALBUMIN/GLOB SERPL: 0.9 G/DL
ALP SERPL-CCNC: 101 U/L (ref 39–117)
ALT SERPL W P-5'-P-CCNC: 13 U/L (ref 1–33)
AMPHET+METHAMPHET UR QL: NEGATIVE
ANION GAP SERPL CALCULATED.3IONS-SCNC: 19 MMOL/L (ref 5–15)
AST SERPL-CCNC: 23 U/L (ref 1–32)
B PARAPERT DNA SPEC QL NAA+PROBE: NOT DETECTED
B PERT DNA SPEC QL NAA+PROBE: NOT DETECTED
BACTERIA UR QL AUTO: ABNORMAL /HPF
BARBITURATES UR QL SCN: NEGATIVE
BASOPHILS # BLD AUTO: 0.13 10*3/MM3 (ref 0–0.2)
BASOPHILS NFR BLD AUTO: 0.4 % (ref 0–1.5)
BENZODIAZ UR QL SCN: NEGATIVE
BILIRUB SERPL-MCNC: 0.3 MG/DL (ref 0–1.2)
BILIRUB UR QL STRIP: NEGATIVE
BUN SERPL-MCNC: 68 MG/DL (ref 8–23)
BUN/CREAT SERPL: 13.6 (ref 7–25)
C PNEUM DNA NPH QL NAA+NON-PROBE: NOT DETECTED
CALCIUM SPEC-SCNC: 9 MG/DL (ref 8.6–10.5)
CANNABINOIDS SERPL QL: NEGATIVE
CHLORIDE SERPL-SCNC: 104 MMOL/L (ref 98–107)
CLARITY UR: ABNORMAL
CO2 SERPL-SCNC: 14 MMOL/L (ref 22–29)
COCAINE UR QL: NEGATIVE
COLOR UR: ABNORMAL
CREAT SERPL-MCNC: 4.99 MG/DL (ref 0.57–1)
D-LACTATE SERPL-SCNC: 1.6 MMOL/L (ref 0.5–2)
D-LACTATE SERPL-SCNC: 2.4 MMOL/L (ref 0.5–2)
DEPRECATED RDW RBC AUTO: 54.1 FL (ref 37–54)
EGFRCR SERPLBLD CKD-EPI 2021: 8.1 ML/MIN/1.73
EOSINOPHIL # BLD AUTO: 0.02 10*3/MM3 (ref 0–0.4)
EOSINOPHIL NFR BLD AUTO: 0.1 % (ref 0.3–6.2)
ERYTHROCYTE [DISTWIDTH] IN BLOOD BY AUTOMATED COUNT: 16.4 % (ref 12.3–15.4)
ETHANOL BLD-MCNC: <10 MG/DL (ref 0–10)
ETHANOL UR QL: <0.01 %
FENTANYL UR-MCNC: NEGATIVE NG/ML
FLUAV SUBTYP SPEC NAA+PROBE: NOT DETECTED
FLUBV RNA ISLT QL NAA+PROBE: NOT DETECTED
GLOBULIN UR ELPH-MCNC: 3.4 GM/DL
GLUCOSE BLDC GLUCOMTR-MCNC: 176 MG/DL (ref 70–130)
GLUCOSE BLDC GLUCOMTR-MCNC: 176 MG/DL (ref 70–130)
GLUCOSE SERPL-MCNC: 173 MG/DL (ref 65–99)
GLUCOSE UR STRIP-MCNC: NEGATIVE MG/DL
HADV DNA SPEC NAA+PROBE: NOT DETECTED
HCOV 229E RNA SPEC QL NAA+PROBE: NOT DETECTED
HCOV HKU1 RNA SPEC QL NAA+PROBE: NOT DETECTED
HCOV NL63 RNA SPEC QL NAA+PROBE: NOT DETECTED
HCOV OC43 RNA SPEC QL NAA+PROBE: NOT DETECTED
HCT VFR BLD AUTO: 39.1 % (ref 34–46.6)
HGB BLD-MCNC: 12.5 G/DL (ref 12–15.9)
HGB UR QL STRIP.AUTO: ABNORMAL
HMPV RNA NPH QL NAA+NON-PROBE: NOT DETECTED
HPIV1 RNA ISLT QL NAA+PROBE: NOT DETECTED
HPIV2 RNA SPEC QL NAA+PROBE: NOT DETECTED
HPIV3 RNA NPH QL NAA+PROBE: NOT DETECTED
HPIV4 P GENE NPH QL NAA+PROBE: NOT DETECTED
HYALINE CASTS UR QL AUTO: ABNORMAL /LPF
KETONES UR QL STRIP: ABNORMAL
LEUKOCYTE ESTERASE UR QL STRIP.AUTO: ABNORMAL
LYMPHOCYTES # BLD AUTO: 1.63 10*3/MM3 (ref 0.7–3.1)
LYMPHOCYTES NFR BLD AUTO: 5.5 % (ref 19.6–45.3)
M PNEUMO IGG SER IA-ACNC: NOT DETECTED
MAGNESIUM SERPL-MCNC: 2.1 MG/DL (ref 1.6–2.4)
MCH RBC QN AUTO: 28.4 PG (ref 26.6–33)
MCHC RBC AUTO-ENTMCNC: 32 G/DL (ref 31.5–35.7)
MCV RBC AUTO: 88.9 FL (ref 79–97)
METHADONE UR QL SCN: NEGATIVE
MONOCYTES # BLD AUTO: 3.16 10*3/MM3 (ref 0.1–0.9)
MONOCYTES NFR BLD AUTO: 10.6 % (ref 5–12)
NEUTROPHILS NFR BLD AUTO: 24.58 10*3/MM3 (ref 1.7–7)
NEUTROPHILS NFR BLD AUTO: 82.3 % (ref 42.7–76)
NITRITE UR QL STRIP: NEGATIVE
OPIATES UR QL: POSITIVE
OXYCODONE UR QL SCN: NEGATIVE
PH UR STRIP.AUTO: 6 [PH] (ref 5–8)
PLATELET # BLD AUTO: 417 10*3/MM3 (ref 140–450)
PMV BLD AUTO: 10.1 FL (ref 6–12)
POTASSIUM SERPL-SCNC: 4.5 MMOL/L (ref 3.5–5.2)
PROCALCITONIN SERPL-MCNC: 0.79 NG/ML (ref 0–0.25)
PROT SERPL-MCNC: 6.5 G/DL (ref 6–8.5)
PROT UR QL STRIP: ABNORMAL
RBC # BLD AUTO: 4.4 10*6/MM3 (ref 3.77–5.28)
RBC # UR STRIP: ABNORMAL /HPF
REF LAB TEST METHOD: ABNORMAL
RHINOVIRUS RNA SPEC NAA+PROBE: NOT DETECTED
RSV RNA NPH QL NAA+NON-PROBE: NOT DETECTED
SARS-COV-2 RNA NPH QL NAA+NON-PROBE: NOT DETECTED
SODIUM SERPL-SCNC: 137 MMOL/L (ref 136–145)
SP GR UR STRIP: 1.02 (ref 1–1.03)
SQUAMOUS #/AREA URNS HPF: ABNORMAL /HPF
UROBILINOGEN UR QL STRIP: ABNORMAL
WBC # UR STRIP: ABNORMAL /HPF
WBC NRBC COR # BLD AUTO: 29.84 10*3/MM3 (ref 3.4–10.8)
YEAST URNS QL MICRO: ABNORMAL /HPF

## 2024-01-27 PROCEDURE — 83605 ASSAY OF LACTIC ACID: CPT | Performed by: PHYSICIAN ASSISTANT

## 2024-01-27 PROCEDURE — 82948 REAGENT STRIP/BLOOD GLUCOSE: CPT

## 2024-01-27 PROCEDURE — 36415 COLL VENOUS BLD VENIPUNCTURE: CPT | Performed by: PHYSICIAN ASSISTANT

## 2024-01-27 PROCEDURE — P9612 CATHETERIZE FOR URINE SPEC: HCPCS

## 2024-01-27 PROCEDURE — 25010000002 VANCOMYCIN 10 G RECONSTITUTED SOLUTION: Performed by: PHYSICIAN ASSISTANT

## 2024-01-27 PROCEDURE — 81001 URINALYSIS AUTO W/SCOPE: CPT | Performed by: PHYSICIAN ASSISTANT

## 2024-01-27 PROCEDURE — 80307 DRUG TEST PRSMV CHEM ANLYZR: CPT | Performed by: PHYSICIAN ASSISTANT

## 2024-01-27 PROCEDURE — 36415 COLL VENOUS BLD VENIPUNCTURE: CPT

## 2024-01-27 PROCEDURE — 87040 BLOOD CULTURE FOR BACTERIA: CPT | Performed by: PHYSICIAN ASSISTANT

## 2024-01-27 PROCEDURE — 70450 CT HEAD/BRAIN W/O DYE: CPT

## 2024-01-27 PROCEDURE — 25810000003 SODIUM CHLORIDE 0.9 % SOLUTION: Performed by: PHYSICIAN ASSISTANT

## 2024-01-27 PROCEDURE — 25010000002 PIPERACILLIN SOD-TAZOBACTAM PER 1 G: Performed by: PHYSICIAN ASSISTANT

## 2024-01-27 PROCEDURE — 80053 COMPREHEN METABOLIC PANEL: CPT | Performed by: PHYSICIAN ASSISTANT

## 2024-01-27 PROCEDURE — 99285 EMERGENCY DEPT VISIT HI MDM: CPT

## 2024-01-27 PROCEDURE — 63710000001 INSULIN LISPRO (HUMAN) PER 5 UNITS: Performed by: INTERNAL MEDICINE

## 2024-01-27 PROCEDURE — 82077 ASSAY SPEC XCP UR&BREATH IA: CPT | Performed by: PHYSICIAN ASSISTANT

## 2024-01-27 PROCEDURE — 71045 X-RAY EXAM CHEST 1 VIEW: CPT

## 2024-01-27 PROCEDURE — 25810000003 SODIUM CHLORIDE 0.9 % SOLUTION: Performed by: INTERNAL MEDICINE

## 2024-01-27 PROCEDURE — 84145 PROCALCITONIN (PCT): CPT | Performed by: PHYSICIAN ASSISTANT

## 2024-01-27 PROCEDURE — 85025 COMPLETE CBC W/AUTO DIFF WBC: CPT | Performed by: PHYSICIAN ASSISTANT

## 2024-01-27 PROCEDURE — 83735 ASSAY OF MAGNESIUM: CPT | Performed by: PHYSICIAN ASSISTANT

## 2024-01-27 PROCEDURE — 0202U NFCT DS 22 TRGT SARS-COV-2: CPT | Performed by: PHYSICIAN ASSISTANT

## 2024-01-27 RX ORDER — ACETAMINOPHEN 325 MG/1
650 TABLET ORAL EVERY 4 HOURS PRN
Status: DISCONTINUED | OUTPATIENT
Start: 2024-01-27 | End: 2024-02-10 | Stop reason: HOSPADM

## 2024-01-27 RX ORDER — DEXTROSE MONOHYDRATE 25 G/50ML
25 INJECTION, SOLUTION INTRAVENOUS
Status: DISCONTINUED | OUTPATIENT
Start: 2024-01-27 | End: 2024-02-10 | Stop reason: HOSPADM

## 2024-01-27 RX ORDER — ONDANSETRON 2 MG/ML
4 INJECTION INTRAMUSCULAR; INTRAVENOUS EVERY 6 HOURS PRN
Status: DISCONTINUED | OUTPATIENT
Start: 2024-01-27 | End: 2024-02-10 | Stop reason: HOSPADM

## 2024-01-27 RX ORDER — AMOXICILLIN 250 MG
2 CAPSULE ORAL 2 TIMES DAILY
Status: DISCONTINUED | OUTPATIENT
Start: 2024-01-27 | End: 2024-01-28

## 2024-01-27 RX ORDER — BISACODYL 10 MG
10 SUPPOSITORY, RECTAL RECTAL DAILY PRN
Status: DISCONTINUED | OUTPATIENT
Start: 2024-01-27 | End: 2024-01-28

## 2024-01-27 RX ORDER — ONDANSETRON 4 MG/1
4 TABLET, ORALLY DISINTEGRATING ORAL EVERY 6 HOURS PRN
Status: DISCONTINUED | OUTPATIENT
Start: 2024-01-27 | End: 2024-02-10 | Stop reason: HOSPADM

## 2024-01-27 RX ORDER — POLYETHYLENE GLYCOL 3350 17 G/17G
17 POWDER, FOR SOLUTION ORAL DAILY PRN
Status: DISCONTINUED | OUTPATIENT
Start: 2024-01-27 | End: 2024-01-28

## 2024-01-27 RX ORDER — UREA 10 %
3 LOTION (ML) TOPICAL NIGHTLY PRN
Status: DISCONTINUED | OUTPATIENT
Start: 2024-01-27 | End: 2024-02-10 | Stop reason: HOSPADM

## 2024-01-27 RX ORDER — NICOTINE POLACRILEX 4 MG
15 LOZENGE BUCCAL
Status: DISCONTINUED | OUTPATIENT
Start: 2024-01-27 | End: 2024-02-10 | Stop reason: HOSPADM

## 2024-01-27 RX ORDER — SODIUM CHLORIDE 9 MG/ML
75 INJECTION, SOLUTION INTRAVENOUS CONTINUOUS
Status: DISCONTINUED | OUTPATIENT
Start: 2024-01-27 | End: 2024-01-28

## 2024-01-27 RX ORDER — INSULIN LISPRO 100 [IU]/ML
2-7 INJECTION, SOLUTION INTRAVENOUS; SUBCUTANEOUS
Status: DISCONTINUED | OUTPATIENT
Start: 2024-01-27 | End: 2024-02-10 | Stop reason: HOSPADM

## 2024-01-27 RX ORDER — IBUPROFEN 600 MG/1
1 TABLET ORAL
Status: DISCONTINUED | OUTPATIENT
Start: 2024-01-27 | End: 2024-02-10 | Stop reason: HOSPADM

## 2024-01-27 RX ORDER — BISACODYL 5 MG/1
5 TABLET, DELAYED RELEASE ORAL DAILY PRN
Status: DISCONTINUED | OUTPATIENT
Start: 2024-01-27 | End: 2024-01-28

## 2024-01-27 RX ADMIN — INSULIN LISPRO 2 UNITS: 100 INJECTION, SOLUTION INTRAVENOUS; SUBCUTANEOUS at 22:06

## 2024-01-27 RX ADMIN — SODIUM CHLORIDE 75 ML/HR: 9 INJECTION, SOLUTION INTRAVENOUS at 22:07

## 2024-01-27 RX ADMIN — DOCUSATE SODIUM 50MG AND SENNOSIDES 8.6MG 2 TABLET: 8.6; 5 TABLET, FILM COATED ORAL at 22:06

## 2024-01-27 RX ADMIN — SODIUM CHLORIDE 1000 ML: 9 INJECTION, SOLUTION INTRAVENOUS at 17:10

## 2024-01-27 RX ADMIN — PIPERACILLIN SODIUM AND TAZOBACTAM SODIUM 3.38 G: 3; .375 INJECTION, SOLUTION INTRAVENOUS at 18:40

## 2024-01-27 RX ADMIN — VANCOMYCIN HYDROCHLORIDE 1750 MG: 10 INJECTION, POWDER, LYOPHILIZED, FOR SOLUTION INTRAVENOUS at 19:11

## 2024-01-27 RX ADMIN — SODIUM CHLORIDE 1000 ML: 9 INJECTION, SOLUTION INTRAVENOUS at 19:13

## 2024-01-27 NOTE — ED NOTES
"Nursing report ED to floor  Halie Guidry  83 y.o.  female    HPI :   Chief Complaint   Patient presents with    Altered Mental Status       Admitting doctor:   Kim Barnard MD    Admitting diagnosis:   The primary encounter diagnosis was Sepsis with acute renal failure, due to unspecified organism, unspecified acute renal failure type, unspecified whether septic shock present. A diagnosis of Encephalopathy was also pertinent to this visit.    Code status:   Current Code Status       Date Active Code Status Order ID Comments User Context       Prior            Allergies:   Sulfa antibiotics    Isolation:   Contact Spore and Droplet    Intake and Output  No intake or output data in the 24 hours ending 01/27/24 1845    Weight:       01/27/24  1613   Weight: 88.9 kg (196 lb)       Most recent vitals:   Vitals:    01/27/24 1512 01/27/24 1613 01/27/24 1701 01/27/24 1731   BP: 105/60  113/49 107/48   Pulse: 65  68 73   Resp: 18      Temp: 98.2 °F (36.8 °C)      SpO2: 95%  93% 95%   Weight:  88.9 kg (196 lb)     Height:  172.7 cm (68\")         Active LDAs/IV Access:   Lines, Drains & Airways       Active LDAs       Name Placement date Placement time Site Days    Peripheral IV 01/27/24 1651 Anterior;Right Forearm 01/27/24  1651  Forearm  less than 1                    Labs (abnormal labs have a star):   Labs Reviewed   COMPREHENSIVE METABOLIC PANEL - Abnormal; Notable for the following components:       Result Value    Glucose 173 (*)     BUN 68 (*)     Creatinine 4.99 (*)     CO2 14.0 (*)     Albumin 3.1 (*)     Anion Gap 19.0 (*)     eGFR 8.1 (*)     All other components within normal limits    Narrative:     GFR Normal >60  Chronic Kidney Disease <60  Kidney Failure <15    The GFR formula is only valid for adults with stable renal function between ages 18 and 70.   URINALYSIS W/ MICROSCOPIC IF INDICATED (NO CULTURE) - Abnormal; Notable for the following components:    Color, UA Dark Yellow (*)     " "Appearance, UA Turbid (*)     Ketones, UA Trace (*)     Blood, UA Small (1+) (*)     Protein,  mg/dL (2+) (*)     Leuk Esterase, UA Large (3+) (*)     All other components within normal limits   LACTIC ACID, PLASMA - Abnormal; Notable for the following components:    Lactate 2.4 (*)     All other components within normal limits   PROCALCITONIN - Abnormal; Notable for the following components:    Procalcitonin 0.79 (*)     All other components within normal limits    Narrative:     As a Marker for Sepsis (Non-Neonates):    1. <0.5 ng/mL represents a low risk of severe sepsis and/or septic shock.  2. >2 ng/mL represents a high risk of severe sepsis and/or septic shock.    As a Marker for Lower Respiratory Tract Infections that require antibiotic therapy:    PCT on Admission    Antibiotic Therapy       6-12 Hrs later    >0.5                Strongly Recommended  >0.25 - <0.5        Recommended   0.1 - 0.25          Discouraged              Remeasure/reassess PCT  <0.1                Strongly Discouraged     Remeasure/reassess PCT    As 28 day mortality risk marker: \"Change in Procalcitonin Result\" (>80% or <=80%) if Day 0 (or Day 1) and Day 4 values are available. Refer to http://[email or web address]-pct-[email or web address]    Change in PCT <=80%  A decrease of PCT levels below or equal to 80% defines a positive change in PCT test result representing a higher risk for 28-day all-cause mortality of patients diagnosed with severe sepsis for septic shock.    Change in PCT >80%  A decrease of PCT levels of more than 80% defines a negative change in PCT result representing a lower risk for 28-day all-cause mortality of patients diagnosed with severe sepsis or septic shock.      URINE DRUG SCREEN - Abnormal; Notable for the following components:    Opiate Screen Positive (*)     All other components within normal limits    Narrative:     Negative Thresholds Per Drugs Screened:    Amphetamines                 500 ng/ml  Barbiturates    "              200 ng/ml  Benzodiazepines              100 ng/ml  Cocaine                      300 ng/ml  Methadone                    300 ng/ml  Opiates                      300 ng/ml  Oxycodone                    100 ng/ml  THC                           50 ng/ml  Fentanyl                       5 ng/ml      The Normal Value for all drugs tested is negative. This report includes final unconfirmed screening results to be used for medical treatment purposes only. Unconfirmed results must not be used for non-medical purposes such as employment or legal testing. Clinical consideration should be applied to any drug of abuse test, particularly when unconfirmed results are used.           CBC WITH AUTO DIFFERENTIAL - Abnormal; Notable for the following components:    WBC 29.84 (*)     RDW 16.4 (*)     RDW-SD 54.1 (*)     Neutrophil % 82.3 (*)     Lymphocyte % 5.5 (*)     Eosinophil % 0.1 (*)     Neutrophils, Absolute 24.58 (*)     Monocytes, Absolute 3.16 (*)     All other components within normal limits   URINALYSIS, MICROSCOPIC ONLY - Abnormal; Notable for the following components:    WBC, UA Too Numerous to Count (*)     Bacteria, UA 4+ (*)     Squamous Epithelial Cells, UA 3-6 (*)     All other components within normal limits   POCT GLUCOSE FINGERSTICK - Abnormal; Notable for the following components:    Glucose 176 (*)     All other components within normal limits   RESPIRATORY PANEL PCR W/ COVID-19 (SARS-COV-2), NP SWAB IN UTM/VTP, 2 HR TAT - Normal    Narrative:     In the setting of a positive respiratory panel with a viral infection PLUS a negative procalcitonin without other underlying concern for bacterial infection, consider observing off antibiotics or discontinuation of antibiotics and continue supportive care. If the respiratory panel is positive for atypical bacterial infection (Bordetella pertussis, Chlamydophila pneumoniae, or Mycoplasma pneumoniae), consider antibiotic de-escalation to target atypical  bacterial infection.   MAGNESIUM - Normal   BLOOD CULTURE   BLOOD CULTURE   ETHANOL   LACTIC ACID, REFLEX   CBC AND DIFFERENTIAL    Narrative:     The following orders were created for panel order CBC & Differential.  Procedure                               Abnormality         Status                     ---------                               -----------         ------                     CBC Auto Differential[[id number]]        Abnormal            Final result                 Please view results for these tests on the individual orders.       EKG:   No orders to display       Meds given in ED:   Medications   piperacillin-tazobactam (ZOSYN) 3.375 g in iso-osmotic dextrose 50 ml (premix) (3.375 g Intravenous New Bag 24 1840)   vancomycin 1750 mg/500 mL 0.9% NS IVPB ([facility]) (has no administration in time range)   sodium chloride 0.9 % bolus 1,000 mL (has no administration in time range)   sodium chloride 0.9 % bolus 1,000 mL (1,000 mL Intravenous New Bag 24 1710)       Imaging results:  XR Chest 1 View    Result Date: 2024  There is suspected mild bibasilar atelectasis. No evidence for infiltrate or further evidence for acute process.  This report was finalized on 2024 6:09 PM by Dr. Blake Murphy M.D on Workstation: [id number]       Ambulatory status:   - slide    Social issues:   Social History     Socioeconomic History    Marital status:     Number of children: 3   Tobacco Use    Smoking status: Former     Packs/day: 1.00     Years: 40.00     Additional pack years: 0.00     Total pack years: 40.00     Types: Cigarettes     Quit date:      Years since quittin.0    Smokeless tobacco: Never   Vaping Use    Vaping Use: Never used   Substance and Sexual Activity    Alcohol use: No    Drug use: No    Sexual activity: Defer       NIH Stroke Scale:       Madelaine Louise RN  24 18:45 EST

## 2024-01-27 NOTE — ED PROVIDER NOTES
EMERGENCY DEPARTMENT ENCOUNTER      PCP: Luis Felipe lFowers MD  Patient Care Team:  Luis Felipe Flowers MD as PCP - General (Family Medicine)  Napoleon Mead MD as PCP - Family Medicine  Lilia Orona APRN as Nurse Practitioner (Nurse Practitioner)  Beena Laguerre APRN as Referring Physician (Gastroenterology)  Napoleon Gutierrez MD as Consulting Physician (Hematology and Oncology)  Stefan Gutierrez MD as Consulting Physician (Pain Medicine)   Independent Historians: Patient, EMS    HPI:  Chief Complaint: Altered mental status  A complete HPI/ROS/PMH/PSH/SH/FH are unobtainable due to: Patient is poor historian    Chronic or social conditions impacting patient care (social determinants of health): None    Context: Halie Guidry is a 83 y.o. female who presents to the ED from [facility] via EMS c/o altered mental status.  Staff reports patient has increased lethargy and AMS since around 11 AM today.  Patient denies current pain or falls.  She is alert to self, place, time but seems to be unable to provide any significant history about current situation.    Review of prior external notes and/or external test results outside of this encounter: Reviewed discharge summary from 1/15/2024.  She has infected diabetic foot ulcer for which she had been treated with course of cefepime.  Positive for COVID and C. difficile and treated with oral vancomycin.  During admission which treated with remdesivir as well for her COVID.  Completed 10-day course of p.o. vancomycin for C. difficile.  Her course was somewhat complicated by intermittent hypoglycemia so she did have her Lantus regimen adjusted.      PAST MEDICAL HISTORY  Active Ambulatory Problems     Diagnosis Date Noted    Compression fracture 04/22/2009    Lumbar degenerative disc disease 07/05/2012    Type 2 diabetes mellitus with hyperglycemia, with long-term current use of insulin     Hyperlipidemia     Benign essential hypertension 08/20/2014    Primary hypothyroidism      Neuropathy     Osteoporosis 09/09/2015    Proteinuria 02/28/2012    Tobacco abuse 08/22/2013    Diabetic eye exam 02/12/2014    Generalized weakness 05/27/2017    Spinal stenosis 05/27/2017    Scoliosis 05/27/2017    Arthritis 05/27/2017    VBI (vertebrobasilar insufficiency) 05/28/2017    Orthostatic hypotension 05/28/2017    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 05/28/2017    Severe hypothyroidism 05/30/2017    Noncompliance with medication regimen 05/30/2017    Vitamin D deficiency disease 06/01/2017    Altered mental status 12/15/2017    Tremor 01/09/2018    Seizure 05/21/2019    Stage 3a chronic kidney disease 03/09/2012    Thoracic degenerative disc disease 01/27/2020    Metabolic encephalopathy 12/02/2021    VIPUL (acute kidney injury) 12/02/2021    Hyperglycemia 12/02/2021    Type II diabetes mellitus, uncontrolled 12/02/2021    Lower abdominal pain 02/23/2022    Transaminitis 02/23/2022    UTI (urinary tract infection) 02/23/2022    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    Choledocholithiasis 02/23/2022    Hypokalemia 02/24/2022    Hypoxia 02/24/2022    Generalized abdominal pain 03/26/2022    History of Clostridium difficile infection 03/26/2022    History of ERCP 03/26/2022    Acute UTI (urinary tract infection) 03/27/2022    Hypomagnesemia 03/28/2022    Burning pain 05/27/2022    Anxiety disorder 05/27/2022    Neuropathic pain 05/27/2022    Intertrigo 05/27/2022    Weakness of both lower extremities 06/24/2022    Accelerated hypertension 09/01/2022    Acute cystitis without hematuria 09/06/2022    Altered mental status, unspecified altered mental status type 11/04/2022    Left lower lobe pneumonia 11/04/2022    Acute pain of left foot 05/13/2023    Cellulitis and abscess of left lower extremity 05/15/2023    Hypertensive kidney disease with stage 3a chronic kidney disease 06/02/2023    Bilateral leg pain 07/21/2023    Peripheral edema 07/30/2023    Primary malignant neuroendocrine tumor  of pancreas 08/24/2023    Encounter for long-term (current) use of high-risk medication 08/28/2023    Diabetic foot ulcer 12/24/2023    Acute renal insufficiency 01/09/2024    C. difficile diarrhea 01/15/2024     Resolved Ambulatory Problems     Diagnosis Date Noted    Leukocytosis     COVID-19 virus detected 02/23/2022    Acute metabolic encephalopathy 06/22/2022    Hypoglycemia 06/23/2022    Acute metabolic encephalopathy 06/24/2022    Diarrhea 11/04/2022    COVID-19 01/10/2024    Cytokine release syndrome, grade 1 01/15/2024     Past Medical History:   Diagnosis Date    Anxiety     Bleeding disorder     Depression     Diabetes     Diabetes mellitus, type 2     Disc degeneration, lumbar     Headache, tension-type     Hypothyroidism     Peripheral neuropathy     Rotator cuff tear, left     Shoulder pain        The patient has started, but not completed, their COVID-19 vaccination series.    PAST SURGICAL HISTORY  Past Surgical History:   Procedure Laterality Date    APPENDECTOMY      BILATERAL BREAST REDUCTION Bilateral 08/2015    CATARACT EXTRACTION  03/2015    CHOLECYSTECTOMY WITH INTRAOPERATIVE CHOLANGIOGRAM N/A 3/27/2022    Procedure: CHOLECYSTECTOMY LAPAROSCOPIC INTRAOPERATIVE CHOLANGIOGRAM;  Surgeon: Aiyana Hill MD;  Location: [facility] OR;  Service: General;  Laterality: N/A;    COLONOSCOPY  06/05/2015    WNL    ERCP N/A 2/25/2022    Procedure: ENDOSCOPIC RETROGRADE CHOLANGIOPANCREATOGRAPHY with sphincterotomy and balloon sweep;  Surgeon: Chilo Wilhelm MD;  Location: [facility] ENDOSCOPY;  Service: Gastroenterology;  Laterality: N/A;  PRE/POST - CBD stones    ERCP N/A 3/28/2022    Procedure: ENDOSCOPIC RETROGRADE CHOLANGIOPANCREATOGRAPHY WITH SPHINCTEROTOMY AND BALLOON SWEEP;  Surgeon: Chilo Wilhelm MD;  Location: [facility] ENDOSCOPY;  Service: Gastroenterology;  Laterality: N/A;  PRE: COMMON DUCT STONE  POST: COMMON DUCT STONE    KYPHOPLASTY      REDUCTION MAMMAPLASTY      TONSILLECTOMY            FAMILY HISTORY  Family History   Problem Relation Age of Onset    Bone cancer Mother     No Known Problems Father     Heart disease Daughter          SOCIAL HISTORY  Social History     Socioeconomic History    Marital status:     Number of children: 3   Tobacco Use    Smoking status: Former     Packs/day: 1.00     Years: 40.00     Additional pack years: 0.00     Total pack years: 40.00     Types: Cigarettes     Quit date:      Years since quittin.0    Smokeless tobacco: Never   Vaping Use    Vaping Use: Never used   Substance and Sexual Activity    Alcohol use: No    Drug use: No    Sexual activity: Defer         ALLERGIES  Sulfa antibiotics        REVIEW OF SYSTEMS  Review of Systems   Unable to perform ROS: Mental status change        All systems reviewed and negative except for those discussed in HPI.       PHYSICAL EXAM    I have reviewed the triage vital signs and nursing notes.    ED Triage Vitals [24 1512]   Temp Heart Rate Resp BP SpO2   98.2 °F (36.8 °C) 65 18 105/60 95 %      Temp src Heart Rate Source Patient Position BP Location FiO2 (%)   -- -- -- -- --       Physical Exam  GENERAL: Drowsy, ill-appearing   SKIN: Warm, dry  HENT: Normocephalic, atraumatic, dry mucous membranes  EYES: no scleral icterus  CV: regular rhythm, regular rate  RESPIRATORY: normal effort, lungs clear  ABDOMEN: soft, nontender, nondistended  MUSCULOSKELETAL: no deformity, 2+ edema to lower extremities  NEURO: alert to self but drowsy and slow to respond, lethargic, moves all extremities, follows commands          LAB RESULTS  Labs Reviewed   CLOSTRIDIOIDES DIFFICILE TOXIN, PCR - Abnormal; Notable for the following components:       Result Value    Toxigenic C. difficile by PCR Positive (*)     All other components within normal limits    Narrative:     DNA from a toxigenic strain of C.difficile has been detected. Antigen testing for the presence of free C.difficile toxin is currently in progress, to  "help determine the clinical significance of this PCR result.    COMPREHENSIVE METABOLIC PANEL - Abnormal; Notable for the following components:    Glucose 173 (*)     BUN 68 (*)     Creatinine 4.99 (*)     CO2 14.0 (*)     Albumin 3.1 (*)     Anion Gap 19.0 (*)     eGFR 8.1 (*)     All other components within normal limits    Narrative:     GFR Normal >60  Chronic Kidney Disease <60  Kidney Failure <15    The GFR formula is only valid for adults with stable renal function between ages 18 and 70.   URINALYSIS W/ MICROSCOPIC IF INDICATED (NO CULTURE) - Abnormal; Notable for the following components:    Color, UA Dark Yellow (*)     Appearance, UA Turbid (*)     Ketones, UA Trace (*)     Blood, UA Small (1+) (*)     Protein,  mg/dL (2+) (*)     Leuk Esterase, UA Large (3+) (*)     All other components within normal limits   LACTIC ACID, PLASMA - Abnormal; Notable for the following components:    Lactate 2.4 (*)     All other components within normal limits   PROCALCITONIN - Abnormal; Notable for the following components:    Procalcitonin 0.79 (*)     All other components within normal limits    Narrative:     As a Marker for Sepsis (Non-Neonates):    1. <0.5 ng/mL represents a low risk of severe sepsis and/or septic shock.  2. >2 ng/mL represents a high risk of severe sepsis and/or septic shock.    As a Marker for Lower Respiratory Tract Infections that require antibiotic therapy:    PCT on Admission    Antibiotic Therapy       6-12 Hrs later    >0.5                Strongly Recommended  >0.25 - <0.5        Recommended   0.1 - 0.25          Discouraged              Remeasure/reassess PCT  <0.1                Strongly Discouraged     Remeasure/reassess PCT    As 28 day mortality risk marker: \"Change in Procalcitonin Result\" (>80% or <=80%) if Day 0 (or Day 1) and Day 4 values are available. Refer to http://[email or web address]-pct-[email or web address]    Change in PCT <=80%  A decrease of PCT levels below or equal to 80% defines a " positive change in PCT test result representing a higher risk for 28-day all-cause mortality of patients diagnosed with severe sepsis for septic shock.    Change in PCT >80%  A decrease of PCT levels of more than 80% defines a negative change in PCT result representing a lower risk for 28-day all-cause mortality of patients diagnosed with severe sepsis or septic shock.      URINE DRUG SCREEN - Abnormal; Notable for the following components:    Opiate Screen Positive (*)     All other components within normal limits    Narrative:     Negative Thresholds Per Drugs Screened:    Amphetamines                 500 ng/ml  Barbiturates                 200 ng/ml  Benzodiazepines              100 ng/ml  Cocaine                      300 ng/ml  Methadone                    300 ng/ml  Opiates                      300 ng/ml  Oxycodone                    100 ng/ml  THC                           50 ng/ml  Fentanyl                       5 ng/ml      The Normal Value for all drugs tested is negative. This report includes final unconfirmed screening results to be used for medical treatment purposes only. Unconfirmed results must not be used for non-medical purposes such as employment or legal testing. Clinical consideration should be applied to any drug of abuse test, particularly when unconfirmed results are used.           CBC WITH AUTO DIFFERENTIAL - Abnormal; Notable for the following components:    WBC 29.84 (*)     RDW 16.4 (*)     RDW-SD 54.1 (*)     Neutrophil % 82.3 (*)     Lymphocyte % 5.5 (*)     Eosinophil % 0.1 (*)     Neutrophils, Absolute 24.58 (*)     Monocytes, Absolute 3.16 (*)     All other components within normal limits   URINALYSIS, MICROSCOPIC ONLY - Abnormal; Notable for the following components:    WBC, UA Too Numerous to Count (*)     Bacteria, UA 4+ (*)     Squamous Epithelial Cells, UA 3-6 (*)     All other components within normal limits   BASIC METABOLIC PANEL - Abnormal; Notable for the following  components:    Glucose 158 (*)     BUN 67 (*)     Creatinine 4.05 (*)     Chloride 110 (*)     CO2 13.3 (*)     Calcium 7.6 (*)     Anion Gap 15.7 (*)     eGFR 10.5 (*)     All other components within normal limits    Narrative:     GFR Normal >60  Chronic Kidney Disease <60  Kidney Failure <15    The GFR formula is only valid for adults with stable renal function between ages 18 and 70.   CBC (NO DIFF) - Abnormal; Notable for the following components:    WBC 20.38 (*)     RBC 3.74 (*)     Hemoglobin 10.4 (*)     Hematocrit 31.3 (*)     RDW 16.3 (*)     All other components within normal limits   POCT GLUCOSE FINGERSTICK - Abnormal; Notable for the following components:    Glucose 176 (*)     All other components within normal limits   POCT GLUCOSE FINGERSTICK - Abnormal; Notable for the following components:    Glucose 176 (*)     All other components within normal limits   POCT GLUCOSE FINGERSTICK - Abnormal; Notable for the following components:    Glucose 162 (*)     All other components within normal limits   POCT GLUCOSE FINGERSTICK - Abnormal; Notable for the following components:    Glucose 161 (*)     All other components within normal limits   POCT GLUCOSE FINGERSTICK - Abnormal; Notable for the following components:    Glucose 199 (*)     All other components within normal limits   RESPIRATORY PANEL PCR W/ COVID-19 (SARS-COV-2), NP SWAB IN UTM/VTP, 2 HR TAT - Normal    Narrative:     In the setting of a positive respiratory panel with a viral infection PLUS a negative procalcitonin without other underlying concern for bacterial infection, consider observing off antibiotics or discontinuation of antibiotics and continue supportive care. If the respiratory panel is positive for atypical bacterial infection (Bordetella pertussis, Chlamydophila pneumoniae, or Mycoplasma pneumoniae), consider antibiotic de-escalation to target atypical bacterial infection.   BLOOD CULTURE - Normal    Narrative:     Less than  seven (7) mL's of blood was collected.  Insufficient quantity may yield false negative results.   BLOOD CULTURE - Normal   CLOSTRIDIOIDES DIFFICILE TOXIN AG (REFLEX ONLY) - Normal    Narrative:     DNA from a toxigenic strain of C.difficile was detected, although the free toxin itself was not detected. These findings are consistent with C.difficile colonization and may not reflect actual C.difficile infection. Clinical correlation needed.   MAGNESIUM - Normal   LACTIC ACID, REFLEX - Normal   CLOSTRIDIOIDES DIFFICILE TOXIN    Narrative:     The following orders were created for panel order Clostridioides difficile Toxin - Stool, Per Rectum.  Procedure                               Abnormality         Status                     ---------                               -----------         ------                     Clostridioides difficile...[[id number]]  Abnormal            Final result                 Please view results for these tests on the individual orders.   ETHANOL   SODIUM, URINE, RANDOM   CREATININE URINE RANDOM (KIDNEY FUNCTION) GFR COMPONENT   POCT GLUCOSE FINGERSTICK   POCT GLUCOSE FINGERSTICK   POCT GLUCOSE FINGERSTICK   POCT GLUCOSE FINGERSTICK   POCT GLUCOSE FINGERSTICK   POCT GLUCOSE FINGERSTICK   POCT GLUCOSE FINGERSTICK   POCT GLUCOSE FINGERSTICK   CBC AND DIFFERENTIAL    Narrative:     The following orders were created for panel order CBC & Differential.  Procedure                               Abnormality         Status                     ---------                               -----------         ------                     CBC Auto Differential[[id number]]        Abnormal            Final result                 Please view results for these tests on the individual orders.       Ordered the above labs and independently reviewed and interpreted the results.        RADIOLOGY  CT Head Without Contrast   Final Result   Chronic small vessel ischemic white matter change. 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 No evidence for   intracranial hemorrhage or acute intracranial abnormality.       This report was finalized on 1/27/2024 7:27 PM by Dr. Blake Murphy M.D on Workstation: [id number]          XR Chest 1 View   Final Result   There is suspected mild bibasilar atelectasis. No evidence   for infiltrate or further evidence for acute process.       This report was finalized on 1/27/2024 6:09 PM by Dr. Blake Murphy M.D on Workstation: [id number]          CT Abdomen Pelvis Without Contrast    (Results Pending)       I ordered the above noted radiological studies. Independently reviewed and interpreted by me.  See dictation for official radiology interpretation.      PROCEDURES    Procedures      MEDICATIONS GIVEN IN ER    Medications   acetaminophen (TYLENOL) tablet 650 mg (has no administration in time range)   ondansetron ODT (ZOFRAN-ODT) disintegrating tablet 4 mg (has no administration in time range)     Or   ondansetron (ZOFRAN) injection 4 mg (has no administration in time range)   melatonin tablet 3 mg (has no administration in time range)   cefTRIAXone (ROCEPHIN) 2,000 mg in sodium chloride 0.9 % 100 mL IVPB-VTB (has no administration in time range)   dextrose (GLUTOSE) oral gel 15 g (has no administration in time range)   dextrose (D50W) (25 g/50 mL) IV injection 25 g (has no administration in time range)   glucagon (GLUCAGEN) injection 1 mg (has no administration in time range)   insulin lispro (HUMALOG/ADMELOG) injection 2-7 Units ( Subcutaneous Not Given 1/28/24 1910)   atorvastatin (LIPITOR) tablet 80 mg (has no administration in time range)   bisoprolol (ZEBeta) tablet 5 mg (5 mg Oral Given 1/28/24 1014)   cadexomer iodine (IODOSORB) 0.9 % gel 1 application  (has no administration in time range)   levothyroxine (SYNTHROID, LEVOTHROID) tablet 112 mcg (has no administration in time range)   levothyroxine  (SYNTHROID, LEVOTHROID) tablet 168 mcg (168 mcg Oral Not Given 1/28/24 0900)   multivitamin with minerals 1 tablet (1 tablet Oral Given 1/28/24 1013)   vitamin B-12 (CYANOCOBALAMIN) tablet 1,000 mcg (1,000 mcg Oral Given 1/28/24 1014)   pregabalin (LYRICA) capsule 50 mg (50 mg Oral Given 1/28/24 1014)   HYDROcodone-acetaminophen (NORCO)  MG per tablet 1 tablet (1 tablet Oral Given 1/28/24 1657)   Pharmacy Consult (has no administration in time range)   Pharmacy Consult (has no administration in time range)   sodium bicarbonate 150 mEq/1000 mL D5W infusion (150 mEq Intravenous New Bag 1/28/24 1650)   vancomycin (VANCOCIN) capsule 125 mg (125 mg Oral Given 1/28/24 1650)   famotidine (PEPCID) tablet 20 mg (has no administration in time range)   sodium chloride 0.9 % bolus 1,000 mL (0 mL Intravenous Stopped 1/27/24 1911)   piperacillin-tazobactam (ZOSYN) 3.375 g in iso-osmotic dextrose 50 ml (premix) (0 g Intravenous Stopped 1/27/24 1911)   vancomycin 1750 mg/500 mL 0.9% NS IVPB ([facility]) (1,750 mg Intravenous New Bag 1/27/24 1911)   sodium chloride 0.9 % bolus 1,000 mL (1,000 mL Intravenous New Bag 1/27/24 1913)         PROGRESS, DATA ANALYSIS, CONSULTS, AND MEDICAL DECISION MAKING    All labs have been independently reviewed and interpreted by me.  All radiology studies have been independently reviewed and interpreted by me and discussed with radiologist dictating the report.   EKG's independently reviewed and interpreted by me.  Discussion below represents my analysis of pertinent findings related to patient's condition, differential diagnosis, treatment plan and final disposition.    Differential diagnosis for AMS includes but is not limited to:  -hypoxic encephalopathy  -acute toxic-metabolic encephalopathy (hypogylcemia, hyperosmolar state, electrolyte abnormalities, hepatic encephalopathy, uremia/renal failure, endocrine ([place]'s disease, Cushing's syndrome, hypothyroidism, myxedema coma, thyroid storm),  CO2  -hypertensive encephalopathy  -etoh intoxication, withdrawal  -Wernicke encephalopathy  -sepsis  -stroke  -subdural/epidural hematoma  -drug reactions (I.e. serotonin syndrome)  -dementia  -hypothermia/hyperthermia  -meningitis  -seizures  -B12, vitamin D, zinc deficiency      ED Course as of 01/28/24 2013   Sat Jan 27, 2024   1554 Glucose(!): 176 [DC]   1644 WBC(!): 29.84  13.1 twelve days ago. Previously elevated with C. diff infection. [DC]   1653 Lactate(!!): 2.4 [DC]   1713 Pt denies diarrhea today. No report from [facility] of recent diarrhea. Will have to initiate broad spectrum antibiotics due to significant leukocytosis despite hx of C. Diff. [DC]   1735 COVID19: Not Detected [DC]   1747 Procalcitonin(!): 0.79 [DC]   1748 BUN(!): 68 [DC]   1748 Creatinine(!): 4.99 [DC]   1748 CO2(!): 14.0 [DC]   1748 Potassium: 4.5 [DC]   1759 Discussed case with Dr. Barnard, [facility], who will accept the patient for admission. [DC]   1805 XR Chest 1 View  Radiology study independently interpreted by me and my findings are no pneumothorax.   [DC]      ED Course User Index  [DC] Dannielle Moore PA             AS OF 20:13 EST VITALS:    BP - 119/44  HR - 78  TEMP - (!) 100.8 °F (38.2 °C) (Oral)  O2 SATS - 90%        DIAGNOSIS  Final diagnoses:   Sepsis with acute renal failure, due to unspecified organism, unspecified acute renal failure type, unspecified whether septic shock present   Encephalopathy         DISPOSITION  ED Disposition       ED Disposition   Decision to Admit    Condition   --    Comment   Level of Care: Telemetry [5]   Diagnosis: Sepsis [[id number]]   Admitting Physician: CASE BARNARD [[id number]]   Attending Physician: CASE BARNARD [[id number]]   Certification: I Certify That Inpatient Hospital Services Are Medically Necessary For Greater Than 2 Midnights                    Note Disclaimer: At [facility], we believe that sharing information builds trust and better relationships. You are receiving this note  because you recently visited [facility]. It is possible you will see health information before a provider has talked with you about it. This kind of information can be easy to misunderstand. To help you fully understand what it means for your health, we urge you to discuss this note with your provider.         Dannielle Moore PA  01/28/24 2013

## 2024-01-27 NOTE — ED PROVIDER NOTES
EMERGENCY DEPARTMENT MD ATTESTATION NOTE    SHARED VISIT: This visit was performed by BOTH a physician and an APC. The substantive portion of the medical decision making was performed by this attesting physician who made or approved the management plan and takes responsibility for patient management. All studies documented in the APC note (if performed) were independently interpreted by me.    The MAD[facility] and I have discussed this patient's history, physical exam, MDM, and treatment plan.  I have reviewed the documentation and personally had a face to face interaction with the patient. The attached note describes my personal findings.        Room Number:  E466/1  PCP: Luis Felipe Flowers MD  Independent Historians: EMS    HPI:    Context: Halie Guidry is a 83 y.o. female with a medical history of diabetes, UTI, CKD, CAD of who presents to the ED c/o acute altered mental status.  Patient has had increased lethargy and AMS according to EMS via nursing home staff.  Patient with recent history of CAD of for which she has completed oral vancomycin course.  Patient additionally recently noted to have COVID.        Review of prior external notes (non-ED) -and- Review of prior external test results outside of this encounter: DC summary from 1/15/2024 reviewed and notable for history of CKD, type 2 diabetes, hypertension, hyperlipidemia, hypothyroid presenting for evaluation of elevated WBC and positive COVID and C. difficile testing.  Patient treated in the hospital with infectious disease consultations and started on IV fluid, oral vancomycin and remdesivir.  Patient eventually able to be discharged back to nursing facility      PHYSICAL EXAM    I have reviewed the triage vital signs and nursing notes.    ED Triage Vitals [01/27/24 1512]   Temp Heart Rate Resp BP SpO2   98.2 °F (36.8 °C) 65 18 105/60 95 %      Temp src Heart Rate Source Patient Position BP Location FiO2 (%)   -- -- -- -- --       Physical Exam  GENERAL:  Lethargic, no acute distress  SKIN: Warm, dry  HENT: Normocephalic, atraumatic  EYES: no scleral icterus  CV: regular rhythm, regular rate  RESPIRATORY: normal effort, lungs clear  ABDOMEN: soft, nontender, nondistended  MUSCULOSKELETAL: no deformity  NEURO: Lethargic and confused      MEDICATIONS GIVEN IN ER  Medications   acetaminophen (TYLENOL) tablet 650 mg (650 mg Oral Given 1/28/24 2054)   ondansetron ODT (ZOFRAN-ODT) disintegrating tablet 4 mg (has no administration in time range)     Or   ondansetron (ZOFRAN) injection 4 mg (has no administration in time range)   melatonin tablet 3 mg (has no administration in time range)   cefTRIAXone (ROCEPHIN) 2,000 mg in sodium chloride 0.9 % 100 mL IVPB-VTB (2,000 mg Intravenous New Bag 1/29/24 2125)   dextrose (GLUTOSE) oral gel 15 g (has no administration in time range)   dextrose (D50W) (25 g/50 mL) IV injection 25 g (has no administration in time range)   glucagon (GLUCAGEN) injection 1 mg (has no administration in time range)   insulin lispro (HUMALOG/ADMELOG) injection 2-7 Units (2 Units Subcutaneous Given 1/29/24 2124)   atorvastatin (LIPITOR) tablet 80 mg (80 mg Oral Given 1/29/24 2124)   bisoprolol (ZEBeta) tablet 5 mg (5 mg Oral Not Given 1/29/24 1507)   cadexomer iodine (IODOSORB) 0.9 % gel 1 application  (has no administration in time range)   levothyroxine (SYNTHROID, LEVOTHROID) tablet 112 mcg (112 mcg Oral Given 1/29/24 0939)   levothyroxine (SYNTHROID, LEVOTHROID) tablet 168 mcg (168 mcg Oral Not Given 1/28/24 0900)   multivitamin with minerals 1 tablet (1 tablet Oral Given 1/29/24 0940)   vitamin B-12 (CYANOCOBALAMIN) tablet 1,000 mcg (1,000 mcg Oral Given 1/29/24 0940)   pregabalin (LYRICA) capsule 50 mg (50 mg Oral Given 1/29/24 0940)   HYDROcodone-acetaminophen (NORCO)  MG per tablet 1 tablet (1 tablet Oral Given 1/29/24 2125)   Pharmacy Consult (has no administration in time range)   Pharmacy Consult (has no administration in time range)    famotidine (PEPCID) tablet 20 mg (20 mg Oral Given 1/29/24 0939)   vancomycin (VANCOCIN) capsule 125 mg (125 mg Oral Given 1/29/24 1802)   vancomycin (VANCOCIN) capsule 125 mg (has no administration in time range)   vancomycin (VANCOCIN) capsule 125 mg (has no administration in time range)   vancomycin (VANCOCIN) capsule 125 mg (has no administration in time range)   Menthol-Zinc Oxide 1 Application (1 Application Topical Given 1/29/24 2125)   castor oil-balsam peru (VENELEX) ointment 1 Application (1 Application Topical Given 1/29/24 2125)   lactated ringers infusion (100 mL/hr Intravenous New Bag 1/29/24 2217)   sodium chloride 0.9 % bolus 1,000 mL (0 mL Intravenous Stopped 1/27/24 1911)   piperacillin-tazobactam (ZOSYN) 3.375 g in iso-osmotic dextrose 50 ml (premix) (0 g Intravenous Stopped 1/27/24 1911)   vancomycin 1750 mg/500 mL 0.9% NS IVPB ([facility]) (1,750 mg Intravenous New Bag 1/27/24 1911)   sodium chloride 0.9 % bolus 1,000 mL (1,000 mL Intravenous New Bag 1/27/24 1913)   potassium chloride (K-DUR,KLOR-CON) ER tablet 40 mEq (40 mEq Oral Given 1/29/24 1518)         ORDERS PLACED DURING THIS VISIT:  Orders Placed This Encounter   Procedures    Respiratory Panel PCR w/COVID-19(SARS-CoV-2) NAY/JEANIE/GAYLE/PAD/COR/YANN In-House, NP Swab in UTM/VTM, 2 HR TAT - Swab, Nasopharynx    Blood Culture - Blood,    Blood Culture - Blood,    Clostridioides difficile Toxin - Stool, Per Rectum    Clostridioides difficile Toxin, PCR - Stool, Per Rectum    Clostridioides difficile toxin Ag, Reflex - Stool,    XR Chest 1 View    CT Head Without Contrast    CT Abdomen Pelvis Without Contrast    Comprehensive Metabolic Panel    Urinalysis With Microscopic If Indicated (No Culture) - Urine, Catheter    Lactic Acid, Plasma    Procalcitonin    Urine Drug Screen - Urine, Clean Catch    Ethanol    Magnesium    CBC Auto Differential    STAT Lactic Acid, Reflex    Urinalysis, Microscopic Only - Urine, Clean Catch    Basic Metabolic Panel     CBC (No Diff)    Sodium, Urine, Random - Urine, Clean Catch    Creatinine Urine Random (kidney function) GFR component - Urine, Clean Catch    Renal Function Panel    Magnesium    CBC Auto Differential    CBC (No Diff)    Comprehensive Metabolic Panel    Magnesium    Diet: Cardiac Diets; Healthy Heart (2-3 Na+); Texture: Regular Texture (IDDSI 7); Fluid Consistency: Thin (IDDSI 0)    Vital Signs    Up with assistance    Daily Weights    Strict Intake & Output    Oral Care    Place Sequential Compression Device    Maintain Sequential Compression Device    OK TO REMOVE FROM ENHANCED RESPIRATORY ISOLATION  Physician Communication Order    Wound Care Pressure Injury    Turn Patient    Head of Bed 30 Degrees or Less (Unless Contraindicated)    Elevate Heels Off of Bed    Use Seat Cushion When Up In Chair    Wound Care    Use Repositioning Wedge to Position Patient    Apply Heel Protector Boot Until Discontinued    Please place pt on BRETT mattress once it arrives on the unit  Nursing Communication    Wound Care    Code Status and Medical Interventions:    [facility] (on-call MD unless specified) Details    Inpatient Nephrology Consult    Inpatient Case Management  Consult for review of Fidaxomicin    Inpatient Infectious Diseases Consult    Inpatient Palliative Care Team Consult    Discontinue Patient Isolation    OT Consult: Eval & Treat    PT Consult: Eval & Treat Functional Mobility Below Baseline    POC Glucose Once    POC Glucose 4x Daily Before Meals & at Bedtime    POC Glucose Once    POC Glucose Once    POC Glucose Once    POC Glucose Once    POC Glucose Once    POC Glucose Once    POC Glucose Once    POC Glucose Once    POC Glucose Once    SCANNED - TELEMETRY      SCANNED - TELEMETRY      Wound Ostomy Eval & Treat    Inpatient Admission    CBC & Differential    CBC & Differential       PROGRESS, DATA ANALYSIS, CONSULTS, AND MEDICAL DECISION MAKING  All labs have been independently interpreted by me.  All  radiology studies have been reviewed by me. All EKG's have been independently viewed and interpreted by me.  Discussion below represents my analysis of pertinent findings related to patient's condition, differential diagnosis, treatment plan and final disposition.    Differential diagnosis includes but is not limited to encephalopathy, UTI, sepsis.      ED Course as of 01/29/24 2238   Sat Jan 27, 2024   1554 Glucose(!): 176 [DC]   1644 WBC(!): 29.84  13.1 twelve days ago. Previously elevated with C. diff infection. [DC]   1653 Lactate(!!): 2.4 [DC]   1713 Pt denies diarrhea today. No report from [facility] of recent diarrhea. Will have to initiate broad spectrum antibiotics due to significant leukocytosis despite hx of C. Diff. [DC]   1735 COVID19: Not Detected [DC]   1747 Procalcitonin(!): 0.79 [DC]   1748 BUN(!): 68 [DC]   1748 Creatinine(!): 4.99 [DC]   1748 CO2(!): 14.0 [DC]   1748 Potassium: 4.5 [DC]   1759 Discussed case with Dr. Barnard, [facility], who will accept the patient for admission. [DC]   1805 XR Chest 1 View  Radiology study independently interpreted by me and my findings are no pneumothorax.   [DC]      ED Course User Index  [DC] Dannielle Mooer PA       MDM: 83-year-old female with history of COVID and C. difficile presenting for evaluation of lethargy and altered mental status.  Patient noted to have a leukocytosis of 30.  Patient additionally noted to have a creatinine of 4.99 which is significantly elevated from patient's baseline.  Patient has a lactic of 2.4 and urinalysis consistent with UTI.  Patient treated empirically with broad-spectrum antibiotics out of concern for sepsis.  Patient will require admission for further evaluation and management.      COMPLEXITY OF CARE  The patient requires admission.    Please note that portions of this document were completed with a voice recognition program.    Note Disclaimer: At [facility], we believe that sharing information builds trust and better  relationships. You are receiving this note because you recently visited [facility]. It is possible you will see health information before a provider has talked with you about it. This kind of information can be easy to misunderstand. To help you fully understand what it means for your health, we urge you to discuss this note with your provider.         Lewis Ram MD  01/27/24 1819       Lewis Rma MD  01/29/24 0446

## 2024-01-27 NOTE — ED NOTES
Pt to er via ems from [facility] c/o ams lethargic. Last known normal was 1100 today.

## 2024-01-28 ENCOUNTER — APPOINTMENT (OUTPATIENT)
Dept: CT IMAGING | Facility: HOSPITAL | Age: 84
DRG: 871 | End: 2024-01-28
Payer: MEDICARE

## 2024-01-28 PROBLEM — L89.302 PRESSURE INJURY OF BUTTOCK, STAGE 2: Status: ACTIVE | Noted: 2024-01-28

## 2024-01-28 PROBLEM — G93.41 METABOLIC ENCEPHALOPATHY: Status: ACTIVE | Noted: 2024-01-28

## 2024-01-28 PROBLEM — N18.31 STAGE 3A CHRONIC KIDNEY DISEASE: Status: ACTIVE | Noted: 2024-01-28

## 2024-01-28 LAB
ANION GAP SERPL CALCULATED.3IONS-SCNC: 15.7 MMOL/L (ref 5–15)
BUN SERPL-MCNC: 67 MG/DL (ref 8–23)
BUN/CREAT SERPL: 16.5 (ref 7–25)
C DIFF GDH + TOXINS A+B STL QL IA.RAPID: NEGATIVE
C DIFF TOX GENS STL QL NAA+PROBE: POSITIVE
CALCIUM SPEC-SCNC: 7.6 MG/DL (ref 8.6–10.5)
CHLORIDE SERPL-SCNC: 110 MMOL/L (ref 98–107)
CO2 SERPL-SCNC: 13.3 MMOL/L (ref 22–29)
CREAT SERPL-MCNC: 4.05 MG/DL (ref 0.57–1)
DEPRECATED RDW RBC AUTO: 49.5 FL (ref 37–54)
EGFRCR SERPLBLD CKD-EPI 2021: 10.5 ML/MIN/1.73
ERYTHROCYTE [DISTWIDTH] IN BLOOD BY AUTOMATED COUNT: 16.3 % (ref 12.3–15.4)
GLUCOSE BLDC GLUCOMTR-MCNC: 161 MG/DL (ref 70–130)
GLUCOSE BLDC GLUCOMTR-MCNC: 162 MG/DL (ref 70–130)
GLUCOSE BLDC GLUCOMTR-MCNC: 199 MG/DL (ref 70–130)
GLUCOSE BLDC GLUCOMTR-MCNC: 222 MG/DL (ref 70–130)
GLUCOSE SERPL-MCNC: 158 MG/DL (ref 65–99)
HCT VFR BLD AUTO: 31.3 % (ref 34–46.6)
HGB BLD-MCNC: 10.4 G/DL (ref 12–15.9)
MCH RBC QN AUTO: 27.8 PG (ref 26.6–33)
MCHC RBC AUTO-ENTMCNC: 33.2 G/DL (ref 31.5–35.7)
MCV RBC AUTO: 83.7 FL (ref 79–97)
PLATELET # BLD AUTO: 304 10*3/MM3 (ref 140–450)
PMV BLD AUTO: 9.6 FL (ref 6–12)
POTASSIUM SERPL-SCNC: 4 MMOL/L (ref 3.5–5.2)
RBC # BLD AUTO: 3.74 10*6/MM3 (ref 3.77–5.28)
SODIUM SERPL-SCNC: 139 MMOL/L (ref 136–145)
WBC NRBC COR # BLD AUTO: 20.38 10*3/MM3 (ref 3.4–10.8)

## 2024-01-28 PROCEDURE — 87493 C DIFF AMPLIFIED PROBE: CPT | Performed by: HOSPITALIST

## 2024-01-28 PROCEDURE — 74176 CT ABD & PELVIS W/O CONTRAST: CPT

## 2024-01-28 PROCEDURE — 25010000002 CEFTRIAXONE PER 250 MG: Performed by: INTERNAL MEDICINE

## 2024-01-28 PROCEDURE — 85027 COMPLETE CBC AUTOMATED: CPT | Performed by: INTERNAL MEDICINE

## 2024-01-28 PROCEDURE — 97162 PT EVAL MOD COMPLEX 30 MIN: CPT

## 2024-01-28 PROCEDURE — 80048 BASIC METABOLIC PNL TOTAL CA: CPT | Performed by: INTERNAL MEDICINE

## 2024-01-28 PROCEDURE — 97110 THERAPEUTIC EXERCISES: CPT

## 2024-01-28 PROCEDURE — 87449 NOS EACH ORGANISM AG IA: CPT | Performed by: HOSPITALIST

## 2024-01-28 PROCEDURE — 82948 REAGENT STRIP/BLOOD GLUCOSE: CPT

## 2024-01-28 PROCEDURE — 63710000001 INSULIN LISPRO (HUMAN) PER 5 UNITS: Performed by: INTERNAL MEDICINE

## 2024-01-28 RX ORDER — AMLODIPINE BESYLATE 10 MG/1
10 TABLET ORAL DAILY
Status: DISCONTINUED | OUTPATIENT
Start: 2024-01-28 | End: 2024-01-28

## 2024-01-28 RX ORDER — PREGABALIN 50 MG/1
50 CAPSULE ORAL DAILY
Status: DISCONTINUED | OUTPATIENT
Start: 2024-01-28 | End: 2024-02-10 | Stop reason: HOSPADM

## 2024-01-28 RX ORDER — HYDROCODONE BITARTRATE AND ACETAMINOPHEN 10; 325 MG/1; MG/1
1 TABLET ORAL 3 TIMES DAILY
Status: DISCONTINUED | OUTPATIENT
Start: 2024-01-28 | End: 2024-01-28

## 2024-01-28 RX ORDER — LEVOTHYROXINE SODIUM 112 UG/1
168 TABLET ORAL WEEKLY
Status: DISCONTINUED | OUTPATIENT
Start: 2024-01-28 | End: 2024-02-10 | Stop reason: HOSPADM

## 2024-01-28 RX ORDER — CHOLECALCIFEROL (VITAMIN D3) 125 MCG
1000 CAPSULE ORAL DAILY
Status: DISCONTINUED | OUTPATIENT
Start: 2024-01-28 | End: 2024-02-10 | Stop reason: HOSPADM

## 2024-01-28 RX ORDER — SODIUM CHLORIDE, SODIUM LACTATE, POTASSIUM CHLORIDE, CALCIUM CHLORIDE 600; 310; 30; 20 MG/100ML; MG/100ML; MG/100ML; MG/100ML
125 INJECTION, SOLUTION INTRAVENOUS CONTINUOUS
Status: DISCONTINUED | OUTPATIENT
Start: 2024-01-28 | End: 2024-01-28

## 2024-01-28 RX ORDER — MULTIPLE VITAMINS W/ MINERALS TAB 9MG-400MCG
1 TAB ORAL DAILY
Status: DISCONTINUED | OUTPATIENT
Start: 2024-01-28 | End: 2024-02-10 | Stop reason: HOSPADM

## 2024-01-28 RX ORDER — HYDROCODONE BITARTRATE AND ACETAMINOPHEN 10; 325 MG/1; MG/1
1 TABLET ORAL EVERY 6 HOURS PRN
Status: DISCONTINUED | OUTPATIENT
Start: 2024-01-28 | End: 2024-02-10 | Stop reason: HOSPADM

## 2024-01-28 RX ORDER — VANCOMYCIN HYDROCHLORIDE 125 MG/1
125 CAPSULE ORAL EVERY 6 HOURS SCHEDULED
Status: DISCONTINUED | OUTPATIENT
Start: 2024-01-28 | End: 2024-01-29

## 2024-01-28 RX ORDER — VANCOMYCIN HYDROCHLORIDE 125 MG/1
125 CAPSULE ORAL EVERY 6 HOURS SCHEDULED
Status: DISCONTINUED | OUTPATIENT
Start: 2024-01-28 | End: 2024-01-28

## 2024-01-28 RX ORDER — FAMOTIDINE 20 MG/1
20 TABLET, FILM COATED ORAL DAILY
Status: DISCONTINUED | OUTPATIENT
Start: 2024-01-29 | End: 2024-02-10 | Stop reason: HOSPADM

## 2024-01-28 RX ORDER — LEVOTHYROXINE SODIUM 112 UG/1
112 TABLET ORAL
Status: DISCONTINUED | OUTPATIENT
Start: 2024-01-29 | End: 2024-02-10 | Stop reason: HOSPADM

## 2024-01-28 RX ORDER — ATORVASTATIN CALCIUM 80 MG/1
80 TABLET, FILM COATED ORAL NIGHTLY
Status: DISCONTINUED | OUTPATIENT
Start: 2024-01-28 | End: 2024-02-10 | Stop reason: HOSPADM

## 2024-01-28 RX ORDER — BISOPROLOL FUMARATE 5 MG/1
5 TABLET, FILM COATED ORAL DAILY
Status: DISCONTINUED | OUTPATIENT
Start: 2024-01-28 | End: 2024-02-10 | Stop reason: HOSPADM

## 2024-01-28 RX ORDER — PANTOPRAZOLE SODIUM 40 MG/1
40 TABLET, DELAYED RELEASE ORAL
Status: DISCONTINUED | OUTPATIENT
Start: 2024-01-28 | End: 2024-01-28 | Stop reason: ALTCHOICE

## 2024-01-28 RX ADMIN — Medication 1000 MCG: at 10:14

## 2024-01-28 RX ADMIN — INSULIN LISPRO 3 UNITS: 100 INJECTION, SOLUTION INTRAVENOUS; SUBCUTANEOUS at 21:31

## 2024-01-28 RX ADMIN — CEFTRIAXONE 2000 MG: 2 INJECTION, POWDER, FOR SOLUTION INTRAMUSCULAR; INTRAVENOUS at 20:42

## 2024-01-28 RX ADMIN — HYDROCODONE BITARTRATE AND ACETAMINOPHEN 1 TABLET: 10; 325 TABLET ORAL at 16:57

## 2024-01-28 RX ADMIN — PANTOPRAZOLE SODIUM 40 MG: 40 TABLET, DELAYED RELEASE ORAL at 06:30

## 2024-01-28 RX ADMIN — BISOPROLOL FUMARATE 5 MG: 5 TABLET, FILM COATED ORAL at 10:14

## 2024-01-28 RX ADMIN — ACETAMINOPHEN 650 MG: 325 TABLET, FILM COATED ORAL at 20:54

## 2024-01-28 RX ADMIN — VANCOMYCIN HYDROCHLORIDE 125 MG: 125 CAPSULE ORAL at 16:50

## 2024-01-28 RX ADMIN — VANCOMYCIN HYDROCHLORIDE 125 MG: 125 CAPSULE ORAL at 23:15

## 2024-01-28 RX ADMIN — ATORVASTATIN CALCIUM 80 MG: 80 TABLET, FILM COATED ORAL at 20:41

## 2024-01-28 RX ADMIN — Medication 1 TABLET: at 10:13

## 2024-01-28 RX ADMIN — PREGABALIN 50 MG: 50 CAPSULE ORAL at 10:14

## 2024-01-28 RX ADMIN — SODIUM BICARBONATE 150 MEQ: 84 INJECTION, SOLUTION INTRAVENOUS at 16:50

## 2024-01-28 NOTE — THERAPY EVALUATION
Patient Name: Halie Guidry  : 1940    MRN: [id number]                              Today's Date: 2024       Admit Date: 2024    Visit Dx:     ICD-10-CM ICD-9-CM   1. Sepsis with acute renal failure, due to unspecified organism, unspecified acute renal failure type, unspecified whether septic shock present  A41.9 038.9    R65.20 995.92    N17.9 584.9   2. Encephalopathy  G93.40 348.30     Patient Active Problem List   Diagnosis    Compression fracture    Lumbar degenerative disc disease    Type 2 diabetes mellitus with hyperglycemia, with long-term current use of insulin    Hyperlipidemia    Benign essential hypertension    Primary hypothyroidism    Neuropathy    Osteoporosis    Proteinuria    Tobacco abuse    Diabetic eye exam    Generalized weakness    Spinal stenosis    Scoliosis    Arthritis    VBI (vertebrobasilar insufficiency)    Orthostatic hypotension    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    Severe hypothyroidism    Noncompliance with medication regimen    Vitamin D deficiency disease    Altered mental status    Tremor    Seizure    Stage 3a chronic kidney disease    Thoracic degenerative disc disease    Metabolic encephalopathy    Hyperglycemia    Type II diabetes mellitus, uncontrolled    Lower abdominal pain    Transaminitis    UTI (urinary tract infection)    Emphysematous cystitis    Choledocholithiasis    Hypokalemia    Hypoxia    Generalized abdominal pain    History of Clostridium difficile infection    History of ERCP    Acute UTI (urinary tract infection)    Hypomagnesemia    Burning pain    Anxiety disorder    Neuropathic pain    Intertrigo    Weakness of both lower extremities    Accelerated hypertension    Acute cystitis without hematuria    Altered mental status, unspecified altered mental status type    Left lower lobe pneumonia    Acute pain of left foot    Cellulitis and abscess of left lower extremity    Hypertensive kidney disease with stage 3a  chronic kidney disease    Bilateral leg pain    Peripheral edema    Primary malignant neuroendocrine tumor of pancreas    Encounter for long-term (current) use of high-risk medication    Diabetic foot ulcer    Acute renal insufficiency    Sepsis     Past Medical History:   Diagnosis Date    Acute metabolic encephalopathy 6/24/2022    Anxiety     Arthritis     Benign essential hypertension 08/20/2014    Bleeding disorder     Depression     Diabetes     Diabetes mellitus, type 2     Disc degeneration, lumbar     Headache, tension-type     Hyperlipidemia     Hypothyroidism     Neuropathy     Osteoporosis 09/09/2015    Peripheral neuropathy     Rotator cuff tear, left     Scoliosis     Shoulder pain     LEFT, TORN ROTATOR CUFF S/P FALL    Spinal stenosis      Past Surgical History:   Procedure Laterality Date    APPENDECTOMY      BILATERAL BREAST REDUCTION Bilateral 08/2015    CATARACT EXTRACTION  03/2015    CHOLECYSTECTOMY WITH INTRAOPERATIVE CHOLANGIOGRAM N/A 3/27/2022    Procedure: CHOLECYSTECTOMY LAPAROSCOPIC INTRAOPERATIVE CHOLANGIOGRAM;  Surgeon: Aiyana Hill MD;  Location: [facility] OR;  Service: General;  Laterality: N/A;    COLONOSCOPY  06/05/2015    WNL    ERCP N/A 2/25/2022    Procedure: ENDOSCOPIC RETROGRADE CHOLANGIOPANCREATOGRAPHY with sphincterotomy and balloon sweep;  Surgeon: Chilo Wilhelm MD;  Location: [facility] ENDOSCOPY;  Service: Gastroenterology;  Laterality: N/A;  PRE/POST - CBD stones    ERCP N/A 3/28/2022    Procedure: ENDOSCOPIC RETROGRADE CHOLANGIOPANCREATOGRAPHY WITH SPHINCTEROTOMY AND BALLOON SWEEP;  Surgeon: Chilo Wilhelm MD;  Location: [facility] ENDOSCOPY;  Service: Gastroenterology;  Laterality: N/A;  PRE: COMMON DUCT STONE  POST: COMMON DUCT STONE    KYPHOPLASTY      REDUCTION MAMMAPLASTY      TONSILLECTOMY        General Information       Row Name 01/28/24 0845          Physical Therapy Time and Intention    Document Type evaluation  -CS     Mode of Treatment physical  therapy  -       Row Name 01/28/24 0845          General Information    Patient Profile Reviewed yes  -CS     Prior Level of Function min assist:  -       Row Name 01/28/24 0845          Living Environment    People in Home child(beatriz), adult;facility resident  -       Row Name 01/28/24 0845          Cognition    Orientation Status (Cognition) unable/difficult to assess  -       Row Name 01/28/24 0845          Safety Issues, Functional Mobility    Impairments Affecting Function (Mobility) balance;endurance/activity tolerance;strength;motor control;range of motion (ROM)  -               User Key  (r) = Recorded By, (t) = Taken By, (c) = Cosigned By      Initials Name Provider Type    Bakari Cool, PT Physical Therapist                   Mobility       Row Name 01/28/24 0846          Bed Mobility    Bed Mobility supine-sit;sit-supine  -     Supine-Sit [place] (Bed Mobility) maximum assist (25% patient effort);verbal cues;nonverbal cues (demo/gesture)  -     Sit-Supine [place] (Bed Mobility) maximum assist (25% patient effort);verbal cues;nonverbal cues (demo/gesture)  -       Row Name 01/28/24 0846          Transfers    Comment, (Transfers) unable to stand due to weakness and needing maxA with sitting balance  -               User Key  (r) = Recorded By, (t) = Taken By, (c) = Cosigned By      Initials Name Provider Type    Bakari Cool, PT Physical Therapist                   Obj/Interventions       Row Name 01/28/24 0846          Range of Motion Comprehensive    Comment, General Range of Motion limited bilateral knee, ankle and hip range of motion  -[facility] Name 01/28/24 0846          Strength Comprehensive (MMT)    Comment, General Manual Muscle Testing (MMT) Assessment difficult to assess  -               User Key  (r) = Recorded By, (t) = Taken By, (c) = Cosigned By      Initials Name Provider Type    Bakari Cool, PT Physical Therapist                    Goals/Plan       Row Name 01/28/24 0847          Bed Mobility Goal 1 (PT)    Activity/Assistive Device (Bed Mobility Goal 1, PT) bed mobility activities, all  -CS     [place] Level/Cues Needed (Bed Mobility Goal 1, PT) minimum assist (75% or more patient effort)  -CS     Time Frame (Bed Mobility Goal 1, PT) 1 week  -CS       Row Name 01/28/24 0847          Transfer Goal 1 (PT)    Activity/Assistive Device (Transfer Goal 1, PT) sit-to-stand/stand-to-sit;bed-to-chair/chair-to-bed  -CS     [place] Level/Cues Needed (Transfer Goal 1, PT) minimum assist (75% or more patient effort)  -CS       Row Name 01/28/24 0847          Gait Training Goal 1 (PT)    [place] Level (Gait Training Goal 1, PT) minimum assist (75% or more patient effort)  -CS     Distance (Gait Training Goal 1, PT) 3  -CS     Time Frame (Gait Training Goal 1, PT) 1 week  -CS       Row Name 01/28/24 0847          Therapy Assessment/Plan (PT)    Planned Therapy Interventions (PT) balance training;bed mobility training;home exercise program;gait training;transfer training;neuromuscular re-education;ROM (range of motion);strengthening;stretching;manual therapy techniques  -CS               User Key  (r) = Recorded By, (t) = Taken By, (c) = Cosigned By      Initials Name Provider Type    Bakari Cool, PT Physical Therapist                   Clinical Impression       Row Name 01/28/24 0846          Pain    Pain Intervention(s) Ambulation/increased activity;Repositioned  -CS     Additional Documentation Pain Scale: FACES Pre/Post-Treatment (Group)  -CS       Row Name 01/28/24 0846          Pain Scale: FACES Pre/Post-Treatment    Pain: FACES Scale, Pretreatment 4-->hurts little more  -CS     Posttreatment Pain Rating 4-->hurts little more  -CS       Row Name 01/28/24 0846          Plan of Care Review    Plan of Care Reviewed With patient  -CS       Row Name 01/28/24 0846          Therapy Assessment/Plan (PT)    Rehab Potential (PT) good, to  achieve stated therapy goals  -CS     Criteria for Skilled Interventions Met (PT) yes  -CS     Therapy Frequency (PT) 3 times/wk  -CS       Row Name 01/28/24 0846          Vital Signs    O2 Delivery Pre Treatment room air  -CS       Row Name 01/28/24 0846          Positioning and Restraints    Pre-Treatment Position in bed  -CS     Post Treatment Position bed  -CS     In Bed supine;call light within reach;encouraged to call for assist;exit alarm on  -CS               User Key  (r) = Recorded By, (t) = Taken By, (c) = Cosigned By      Initials Name Provider Type    Bakari Cool, PT Physical Therapist                   Outcome Measures       Row Name 01/28/24 0848 01/27/24 2332       How much help from another person do you currently need...    Turning from your back to your side while in flat bed without using bedrails? 2  -CS 2  -ML    Moving from lying on back to sitting on the side of a flat bed without bedrails? 2  -CS 2  -ML    Moving to and from a bed to a chair (including a wheelchair)? 2  -CS 2  -ML    Standing up from a chair using your arms (e.g., wheelchair, bedside chair)? 1  -CS 1  -ML    Climbing 3-5 steps with a railing? 1  -CS 1  -ML    To walk in hospital room? 1  -CS 1  -ML    AM-PAC 6 Clicks Score (PT) 9  -CS 9  -ML    Highest Level of Mobility Goal 3 --> Sit at edge of bed  -CS 3 --> Sit at edge of bed  -ML      Row Name 01/28/24 0848          Functional Assessment    Outcome Measure Options AM-PAC 6 Clicks Basic Mobility (PT)  -CS               User Key  (r) = Recorded By, (t) = Taken By, (c) = Cosigned By      Initials Name Provider Type    Bakari Cool, PT Physical Therapist    Karen Chandra, RN Registered Nurse                                 Physical Therapy Education       Title: PT OT SLP Therapies (Done)       Topic: Physical Therapy (Done)       Point: Mobility training (Done)       Learning Progress Summary             Patient Acceptance, E,TB, VU,NR by  at 1/28/2024  0848                         Point: Home exercise program (Done)       Learning Progress Summary             Patient Acceptance, E,TB, VU,NR by  at 1/28/2024 0848                         Point: Body mechanics (Done)       Learning Progress Summary             Patient Acceptance, E,TB, VU,NR by  at 1/28/2024 0848                         Point: Precautions (Done)       Learning Progress Summary             Patient Acceptance, E,TB, VU,NR by  at 1/28/2024 0848                                         User Key       Initials Effective Dates Name Provider Type Discipline     07/11/23 -  Bakari Lerma, PT Physical Therapist PT                  PT Recommendation and Plan  Planned Therapy Interventions (PT): balance training, bed mobility training, home exercise program, gait training, transfer training, neuromuscular re-education, ROM (range of motion), strengthening, stretching, manual therapy techniques  Plan of Care Reviewed With: patient     Time Calculation:         PT Charges       Row Name 01/28/24 0850             Time Calculation    Start Time 0826  -      Stop Time 0843  -      Time Calculation (min) 17 min  -      PT Received On 01/28/24  -      PT - Next Appointment 01/29/24  -      PT Goal Re-Cert Due Date 02/04/24  -                User Key  (r) = Recorded By, (t) = Taken By, (c) = Cosigned By      Initials Name Provider Type     Bakari Lerma, PT Physical Therapist                  Therapy Charges for Today       Code Description Service Date Service Provider Modifiers Qty    63834519221 HC PT EVAL MOD COMPLEXITY 2 1/28/2024 Bakari Lerma, PT GP 1    44246265117 HC PT THER PROC EA 15 MIN 1/28/2024 Bakari Lerma, PT GP 1            PT G-Codes  Outcome Measure Options: AM-PAC 6 Clicks Basic Mobility (PT)  AM-PAC 6 Clicks Score (PT): 9  PT Discharge Summary  Anticipated Discharge Disposition (PT): skilled nursing facility    Bakari Lerma PT  1/28/2024     Bilateral Helical Rim Advancement Flap Text: The defect edges were debeveled with a #15 blade scalpel.  Given the location of the defect and the proximity to free margins (helical rim) a bilateral helical rim advancement flap was deemed most appropriate.  Using a sterile surgical marker, the appropriate advancement flaps were drawn incorporating the defect and placing the expected incisions between the helical rim and antihelix where possible.  The area thus outlined was incised through and through with a #15 scalpel blade.  With a skin hook and iris scissors, the flaps were gently and sharply undermined and freed up.

## 2024-01-28 NOTE — PROGRESS NOTES
[facility]    [phone]   LOS: 1 day     Name: Halie Guidry  Age/Sex: 83 y.o. female  :  1940        PCP: Luis Felipe Flowers MD  Chief Complaint   Patient presents with    Altered Mental Status      Subjective   She is lethargic and minimally responsive.  She does open her eyes to loud noise and to stimuli.  She quickly falls back asleep does not answer questions readily.  Some questions she answers but may not be appropriate.  Per nursing she is having quite a bit of diarrhea and is passed a lot of liquid stool.  She has no control over this and is incontinent.    amLODIPine, 10 mg, Oral, Daily  atorvastatin, 80 mg, Oral, Nightly  bisoprolol, 5 mg, Oral, Daily  cefTRIAXone, 2,000 mg, Intravenous, Q24H  insulin lispro, 2-7 Units, Subcutaneous, 4x Daily AC & at Bedtime  [START ON 2024] levothyroxine, 112 mcg, Oral, Once per day on   levothyroxine, 168 mcg, Oral, Weekly  multivitamin with minerals, 1 tablet, Oral, Daily  pantoprazole, 40 mg, Oral, Q AM  pregabalin, 50 mg, Oral, Daily  cyanocobalamin, 1,000 mcg, Oral, Daily      sodium chloride, 75 mL/hr, Last Rate: Stopped (24 1115)        Objective   Vital Signs  Temp:  [98.1 °F (36.7 °C)-100.2 °F (37.9 °C)] 99.3 °F (37.4 °C)  Heart Rate:  [65-84] 84  Resp:  [16-18] 16  BP: (104-132)/(48-60) 106/49  Body mass index is 32.05 kg/m².    Intake/Output Summary (Last 24 hours) at 2024 1357  Last data filed at 2024 0953  Gross per 24 hour   Intake 1050 ml   Output --   Net 1050 ml       Physical Exam  Vitals and nursing note reviewed.   Constitutional:       General: She is in acute distress.      Appearance: She is obese. She is ill-appearing.      Comments: Lethargic but arousable   Cardiovascular:      Rate and Rhythm: Normal rate and regular rhythm.   Pulmonary:      Effort: No respiratory distress.      Breath sounds: Normal breath sounds.   Abdominal:      General:  Bowel sounds are normal.      Palpations: Abdomen is soft.      Tenderness: There is abdominal tenderness.   Neurological:      Mental Status: She is disoriented.      Comments: Lethargic unable to assess neurostatus given not following commands           Results Review:       I reviewed the patient's new clinical results.  Results from last 7 days   Lab Units 01/28/24  0928 01/27/24  1615   WBC 10*3/mm3 20.38* 29.84*   HEMOGLOBIN g/dL 10.4* 12.5   PLATELETS 10*3/mm3 304 417     Results from last 7 days   Lab Units 01/28/24  0928 01/27/24  1615   SODIUM mmol/L 139 137   POTASSIUM mmol/L 4.0 4.5   CHLORIDE mmol/L 110* 104   CO2 mmol/L 13.3* 14.0*   BUN mg/dL 67* 68*   CREATININE mg/dL 4.05* 4.99*   CALCIUM mg/dL 7.6* 9.0   MAGNESIUM mg/dL  --  2.1   Estimated Creatinine Clearance: 12.7 mL/min (A) (by C-G formula based on SCr of 4.05 mg/dL (H)).  Lab Results   Component Value Date    HGBA1C 8.20 (H) 12/24/2023    HGBA1C 11.80 (H) 06/02/2023    HGBA1C 7.70 (H) 09/01/2022     Glucose   Date/Time Value Ref Range Status   01/28/2024 1241 161 (H) 70 - 130 mg/dL Final   01/28/2024 0815 162 (H) 70 - 130 mg/dL Final   01/27/2024 2149 176 (H) 70 - 130 mg/dL Final   01/27/2024 1551 176 (H) 70 - 130 mg/dL Final         Assessment & Plan   Active Hospital Problems    Diagnosis  POA    **Sepsis [A41.9]  Yes    Stage 3a chronic kidney disease [N18.31]  Yes    Metabolic encephalopathy [G93.41]  Yes    Pressure injury of buttock, stage 2 [L89.302]  Yes    C. difficile diarrhea [A04.72]  Yes    Neuropathic pain [M79.2]  Yes    Acute UTI (urinary tract infection) [N39.0]  Yes    VIPUL (acute kidney injury) [N17.9]  Yes    Seizure [R56.9]  Yes    Hyperlipidemia [E78.5]  Yes    Type 2 diabetes mellitus with hyperglycemia, with long-term current use of insulin [E11.65, Z79.4]  Not Applicable    Benign essential hypertension [I10]  Yes      Resolved Hospital Problems   No resolved problems to display.       PLAN  This is an 83-year-old lady  with a history of chronic kidney disease stage IIIa, hypertension, hyperlipidemia, type 2 diabetes and a fairly complicated recent past medical history with diabetic foot wound infected with multidrug-resistant Enterobacter and subsequent acute renal failure and C. difficile colitis recurrence.  She presents on this hospitalization with diarrhea and altered mental status and is found to have severe sepsis with a white blood cell count of 29 acute renal failure and metabolic encephalopathy likely related to recurrent C. difficile colitis.  -I sent off C. difficile testing but I think it somewhat inconsequential at this point given the amount of diarrhea she is having and the recent infections we will go ahead and initiate oral vancomycin.  Given the number of recurrences she has had I do think it would be of benefit to consider treatment with Dificid.  I have consulted infectious disease to get their input and opinion on ongoing treatment for this lady moving forward.  The other possibility would be to consider treatment with fecal transplant.  -With the severe sepsis she was diagnosed with a urinary tract infection on admission however with her acute renal failure and profuse diarrhea it is difficult to say that this is an active infection in her urine.  Urine culture was not checked in the emergency room think it is of any benefit to check it right now.  Not even convinced that really what were dealing with is the urinary tract infection.  However with her altered mental status severe sepsis difficult to ignore the the pyuria bacteriuria.  Will continue Rocephin but ask infectious disease to weigh in on this.  Clearly with treatment of another infection with antibiotics and only greatly increases her risk for difficulty treating the C. difficile infection  -Getting a stat CT scan of the abdomen and pelvis to further evaluate things.  Her belly is tender and she has a significant leukocytosis.  Given her metabolic  encephalopathy is difficult to obtain full history.  Will evaluate for megacolon evaluate for hydronephrosis and urinary obstruction as well as other complicating factors.  -She has pretty profound acute renal failure.  Baseline creatinine is around 1.  Creatinine on admission was 5.  It has improved to around 4 today with IV fluids but she remains pretty significantly acidotic.  Bicarb is down to 13 this morning.  I switched her fluids to lactated Ringer's but may need to consider placing on a bicarb drip while we hydrate.  At this point there is no urgent indication for dialysis but I did raise the possibility with her family on the phone today.  They are not sure if they would want to pursue that or not.  Will follow-up nephrology recommendations once they have had a chance to evaluate.  -She has pretty significant metabolic encephalopathy related to the sepsis and likely has some underlying delirium.  Continue supportive care  -Blood sugars currently controlled continue present management  -Mechanical DVT prophylaxis  -Full code    This is an 83-year-old lady with multiple medical comorbidities who presents with severe sepsis.  She has a rather complicated recent medical history with this is being her third admission in a month.  From my standpoint she does not really have a great prognosis moving forward.  A lot will depend on how easily we treat this C. difficile colitis and whether or not her kidneys recover.  I do think would be beneficial to get palliative care involved in this situation and follow for ongoing goals of care and management.  She would not be a great candidate for cardiopulmonary resuscitation given her acute events as well as her chronic illness.  I did discuss this with her son on the phone today    Disposition  Expected discharge date/ time has not been documented.       Nolan Case MD  [facility]  01/28/24  13:57 EST

## 2024-01-28 NOTE — H&P
HISTORY AND PHYSICAL   [facility]        Date of Admission: 2024  Patient Identification:  Name: Halie Guidry  Age: 83 y.o.  Sex: female  :  1940  MRN: [id number]                     Primary Care Physician: Luis Felipe Flowers MD    Chief Complaint:  83 year old female was sent in from a nursing home with altered mental status; she has been confused and lethargic; she is no able to give any history and no visitors are present; she was admitted earlier this month and treated for covid and a diabetic foot ulcer    History of Present Illness:   As above    Past Medical History:  Past Medical History:   Diagnosis Date    Acute metabolic encephalopathy 2022    Anxiety     Arthritis     Benign essential hypertension 2014    Bleeding disorder     Depression     Diabetes     Diabetes mellitus, type 2     Disc degeneration, lumbar     Headache, tension-type     Hyperlipidemia     Hypothyroidism     Neuropathy     Osteoporosis 2015    Peripheral neuropathy     Rotator cuff tear, left     Scoliosis     Shoulder pain     LEFT, TORN ROTATOR CUFF S/P FALL    Spinal stenosis      Past Surgical History:  Past Surgical History:   Procedure Laterality Date    APPENDECTOMY      BILATERAL BREAST REDUCTION Bilateral 2015    CATARACT EXTRACTION  2015    CHOLECYSTECTOMY WITH INTRAOPERATIVE CHOLANGIOGRAM N/A 3/27/2022    Procedure: CHOLECYSTECTOMY LAPAROSCOPIC INTRAOPERATIVE CHOLANGIOGRAM;  Surgeon: Aiyana Hill MD;  Location: [facility] MAIN OR;  Service: General;  Laterality: N/A;    COLONOSCOPY  2015    WNL    ERCP N/A 2022    Procedure: ENDOSCOPIC RETROGRADE CHOLANGIOPANCREATOGRAPHY with sphincterotomy and balloon sweep;  Surgeon: Chilo Wilhelm MD;  Location: [facility] ENDOSCOPY;  Service: Gastroenterology;  Laterality: N/A;  PRE/POST - CBD stones    ERCP N/A 3/28/2022    Procedure: ENDOSCOPIC RETROGRADE CHOLANGIOPANCREATOGRAPHY WITH SPHINCTEROTOMY AND BALLOON SWEEP;   Surgeon: Chilo Wilhelm MD;  Location: [facility] ENDOSCOPY;  Service: Gastroenterology;  Laterality: N/A;  PRE: COMMON DUCT STONE  POST: COMMON DUCT STONE    KYPHOPLASTY      REDUCTION MAMMAPLASTY      TONSILLECTOMY        Home Meds:  (Not in a hospital admission)      Allergies:  Allergies   Allergen Reactions    Sulfa Antibiotics Unknown - High Severity     Pt says it was a long time ago and I don't remember but it wasn't good.      Immunizations:  Immunization History   Administered Date(s) Administered    COVID-19 (PFIZER) Purple Cap Monovalent 2021, 2021    FLUAD TRI 65YR+ 10/16/2019    FluMist 2-49yrs 10/03/2012    Fluad Quad 65+ 2020    Fluzone (or Fluarix & Flulaval for VFC) >6mos 2021    Fluzone High Dose =>65 Years ([employer] ONLY) 10/09/2017, 10/09/2017    Pneumococcal Conjugate 13-Valent (PCV13) 2017, 2017    Pneumococcal, Unspecified 10/01/2009    Shingrix 04/15/2019    Td (TDVAX) 2016    Tdap 2017, 2017    Zostavax 2010     Social History:   Social History     Social History Narrative    Not on file     Social History     Socioeconomic History    Marital status:     Number of children: 3   Tobacco Use    Smoking status: Former     Packs/day: 1.00     Years: 40.00     Additional pack years: 0.00     Total pack years: 40.00     Types: Cigarettes     Quit date:      Years since quittin.0    Smokeless tobacco: Never   Vaping Use    Vaping Use: Never used   Substance and Sexual Activity    Alcohol use: No    Drug use: No    Sexual activity: Defer       Family History:  Family History   Problem Relation Age of Onset    Bone cancer Mother     No Known Problems Father     Heart disease Daughter         Review of Systems   Not obtainable    Objective:  T Max 24 hrs: Temp (24hrs), Av.2 °F (36.8 °C), Min:98.2 °F (36.8 °C), Max:98.2 °F (36.8 °C)    Vitals Ranges:   Temp:  [98.2 °F (36.8 °C)] 98.2 °F (36.8 °C)  Heart Rate:  [65-80]  "80  Resp:  [18] 18  BP: (105-132)/(48-60) 132/55      Exam:  /55   Pulse 80   Temp 98.2 °F (36.8 °C)   Resp 18   Ht 172.7 cm (68\")   Wt 88.9 kg (196 lb)   SpO2 95%   BMI 29.80 kg/m²     General Appearance:    drowsy cooperative, no distress, appears stated age   Head:    Normocephalic, without obvious abnormality, atraumatic   Eyes:    PERRL, conjunctivae/corneas clear, EOM's intact, both eyes   Ears:    Normal external ear canals, both ears   Nose:   Nares normal, septum midline, mucosa normal, no drainage    or sinus tenderness   Throat:   Lips, mucosa, and tongue normal   Neck:   Supple, symmetrical, trachea midline, no adenopathy;     thyroid:  no enlargement/tenderness/nodules; no carotid    bruit or JVD   Back:     Symmetric, no curvature, ROM normal, no CVA tenderness   Lungs:     Clear to auscultation bilaterally, respirations unlabored   Chest Wall:    No tenderness or deformity    Heart:    Regular rate and rhythm, S1 and S2 normal, no murmur, rub   or gallop   Abdomen:     Soft, nontender, bowel sounds active all four quadrants,     no masses, no hepatomegaly, no splenomegaly   Extremities:   Extremities normal, atraumatic, no cyanosis or edema                       .    Data Review:  Labs in chart were reviewed.  WBC   Date Value Ref Range Status   01/27/2024 29.84 (H) 3.40 - 10.80 10*3/mm3 Final     Hemoglobin   Date Value Ref Range Status   01/27/2024 12.5 12.0 - 15.9 g/dL Final     Hematocrit   Date Value Ref Range Status   01/27/2024 39.1 34.0 - 46.6 % Final     Platelets   Date Value Ref Range Status   01/27/2024 417 140 - 450 10*3/mm3 Final     Sodium   Date Value Ref Range Status   01/27/2024 137 136 - 145 mmol/L Final     Potassium   Date Value Ref Range Status   01/27/2024 4.5 3.5 - 5.2 mmol/L Final     Chloride   Date Value Ref Range Status   01/27/2024 104 98 - 107 mmol/L Final     CO2   Date Value Ref Range Status   01/27/2024 14.0 (L) 22.0 - 29.0 mmol/L Final     BUN   Date " Value Ref Range Status   01/27/2024 68 (H) 8 - 23 mg/dL Final     Creatinine   Date Value Ref Range Status   01/27/2024 4.99 (H) 0.57 - 1.00 mg/dL Final     Glucose   Date Value Ref Range Status   01/27/2024 173 (H) 65 - 99 mg/dL Final     Calcium   Date Value Ref Range Status   01/27/2024 9.0 8.6 - 10.5 mg/dL Final     Magnesium   Date Value Ref Range Status   01/27/2024 2.1 1.6 - 2.4 mg/dL Final                Imaging Results (All)       Procedure Component Value Units Date/Time    CT Head Without Contrast [[id number]] Collected: 01/27/24 1907     Updated: 01/27/24 1907    Narrative:      CT HEAD WITHOUT CONTRAST     HISTORY: Altered mental status.     TECHNIQUE:  Head CT includes axial imaging from the base of skull to the  vertex without intravenous contrast. Radiation dose reduction techniques  were utilized, including automated exposure control and exposure  modulation based on body size.     COMPARISON: CT head without contrast 0/11/2024.     FINDINGS: There is moderate to severe chronic small vessel ischemic  white matter change that appears similar to the previous exam. Chronic  areas of encephalomalacia are present within both corona radiata and  also within the right caudate body and left external capsule consistent  with small chronic infarctions and these areas are without change. There  are no abnormal areas of increased attenuation intra-axially to suggest  hemorrhage. No extra-axial fluid collection is observed. There is no  evidence for cerebral edema or mass effect or shift of the midline  structures. Intracranial atherosclerotic calcifications are present.       Impression:      Chronic small vessel ischemic white matter change. 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 No evidence for  intracranial hemorrhage or acute intracranial abnormality.       XR Chest 1 View [[id number]] Collected: 01/27/24 1802     Updated:  01/27/24 1812    Narrative:      CHEST SINGLE VIEW     HISTORY: Altered mental status.     COMPARISON: AP chest 01/09/2024.     FINDINGS: Cardiomediastinal silhouette is within normal limits. There is  a calcified nodule in the right mid to lower lung. There are mild  increased interstitial markings particularly in the lung bases where  there is suspected basal atelectasis, though there is no evidence for  focal airspace disease or pulmonary edema or pleural effusion.        Impression:      There is suspected mild bibasilar atelectasis. No evidence  for infiltrate or further evidence for acute process.     This report was finalized on 1/27/2024 6:09 PM by Dr. Blake Murphy M.D on Workstation: [id number]                 Assessment:  Active Hospital Problems    Diagnosis  POA    **Sepsis [A41.9]  Yes      Resolved Hospital Problems   No resolved problems to display.   Uti  Renato  Altered mental status  Diabetes  Hypertension  Hypothyroidism  cad    Plan:  Fluids, antibiotics  Change to rocephin  Monitor on telemetry  Accu checks, sliding scale insulin  [facility] ed provider    Kim Barnard MD  1/27/2024  19:22 EST

## 2024-01-28 NOTE — PLAN OF CARE
Goal Outcome Evaluation: patient is a new admit, she is unable to give valid information pertinent to her care. Patient presented with left foot and sacral ulcer which have been dressed. Vital signs have been stable. Patient having multiple bowel movements.

## 2024-01-28 NOTE — PROGRESS NOTES
[facility] Clinical Pharmacy Services: C. Difficile Medication Changes       Pharmacy has been consulted to look over Halie Guidry's profile to check patient's medications for changes due to C. Difficile diagnosis per the request of Dr. Case.       Current C. Diff Regimen: vancomycin 125 mg PO q6h    Assessment/Plan/Changes       1. Proton pump inhibitor: discontinued protonix and replaced with renal-dose adjusted famotidine.  2. Antiperistalic agents or stool softeners/laxatives: None active       Thank you for allowing me to participate in your patient's care.  Please call pharmacy with any questions or concerns.     Cheyenne Gowers, [facility]  Clinical Pharmacist

## 2024-01-28 NOTE — CONSULTS
Patient Care Team:  Luis Felipe Flowers MD as PCP - General (Family Medicine)  Napoleon Mead MD as PCP - Family Medicine  Lilia Orona APRN as Nurse Practitioner (Nurse Practitioner)  Beena Laguerre APRN as Referring Physician (Gastroenterology)  Napoleon Gutierrez MD as Consulting Physician (Hematology and Oncology)  Stefan Gutierrez MD as Consulting Physician (Pain Medicine)    Chief complaint: VIPUL    Subjective     History of Present Illness  82yo with recent admission with covid and diabetic foot ulcer presented to ER from NH with increased confusion and lethergy.  She has had significant diarrhea and liquid stool with c.diff +.  She appears more awake when I see her, opens her eyes and answer most questions but still some confusion.  No complaints.  Denies any dyspnea.      Review of Systems   Constitutional:  Positive for fatigue. Negative for chills.   Gastrointestinal:  Positive for diarrhea and nausea. Negative for constipation and vomiting.   Genitourinary:  Negative for dysuria.   Musculoskeletal:  Negative for back pain.   Psychiatric/Behavioral:  Positive for confusion.         Past Medical History:   Diagnosis Date    Acute metabolic encephalopathy 6/24/2022    Anxiety     Arthritis     Benign essential hypertension 08/20/2014    Bleeding disorder     Depression     Diabetes     Diabetes mellitus, type 2     Disc degeneration, lumbar     Headache, tension-type     Hyperlipidemia     Hypothyroidism     Neuropathy     Osteoporosis 09/09/2015    Peripheral neuropathy     Rotator cuff tear, left     Scoliosis     Shoulder pain     LEFT, TORN ROTATOR CUFF S/P FALL    Spinal stenosis    ,   Past Surgical History:   Procedure Laterality Date    APPENDECTOMY      BILATERAL BREAST REDUCTION Bilateral 08/2015    CATARACT EXTRACTION  03/2015    CHOLECYSTECTOMY WITH INTRAOPERATIVE CHOLANGIOGRAM N/A 3/27/2022    Procedure: CHOLECYSTECTOMY LAPAROSCOPIC INTRAOPERATIVE CHOLANGIOGRAM;  Surgeon: Sergio  MD Aiyana;  Location: [facility] MAIN OR;  Service: General;  Laterality: N/A;    COLONOSCOPY  2015    WNL    ERCP N/A 2022    Procedure: ENDOSCOPIC RETROGRADE CHOLANGIOPANCREATOGRAPHY with sphincterotomy and balloon sweep;  Surgeon: Chilo Wilhelm MD;  Location: [facility] ENDOSCOPY;  Service: Gastroenterology;  Laterality: N/A;  PRE/POST - CBD stones    ERCP N/A 3/28/2022    Procedure: ENDOSCOPIC RETROGRADE CHOLANGIOPANCREATOGRAPHY WITH SPHINCTEROTOMY AND BALLOON SWEEP;  Surgeon: Chilo Wilhelm MD;  Location: [facility] ENDOSCOPY;  Service: Gastroenterology;  Laterality: N/A;  PRE: COMMON DUCT STONE  POST: COMMON DUCT STONE    KYPHOPLASTY      REDUCTION MAMMAPLASTY      TONSILLECTOMY     ,   Family History   Problem Relation Age of Onset    Bone cancer Mother     No Known Problems Father     Heart disease Daughter    ,   Social History     Socioeconomic History    Marital status:     Number of children: 3   Tobacco Use    Smoking status: Former     Packs/day: 1.00     Years: 40.00     Additional pack years: 0.00     Total pack years: 40.00     Types: Cigarettes     Quit date:      Years since quittin.0    Smokeless tobacco: Never   Vaping Use    Vaping Use: Never used   Substance and Sexual Activity    Alcohol use: No    Drug use: No    Sexual activity: Defer     E-cigarette/Vaping    E-cigarette/Vaping Use Never User     Passive Exposure No     Counseling Given No      E-cigarette/Vaping Substances    Nicotine No     THC No     CBD No     Flavoring No      E-cigarette/Vaping Devices    Disposable No     Pre-filled or Refillable Cartridge No     Refillable Tank No     Pre-filled Pod No          ,   Medications Prior to Admission   Medication Sig Dispense Refill Last Dose    amLODIPine (NORVASC) 10 MG tablet Take 1 tablet by mouth Daily.       atorvastatin (LIPITOR) 80 MG tablet TAKE 1 TABLET EVERY NIGHT (Patient taking differently: Take 1 tablet by mouth Every Night.) 30 tablet 0      BD Pen Needle Naila 2nd Gen 32G X 4 MM misc USE TO INJECT INSULIN FOUR TIMES DAILY 200 each 0     bisoprolol (ZEBeta) 5 MG tablet Take 1 tablet by mouth Daily. 90 tablet 1     cadexomer iodine (IODOSORB) 0.9 % gel Apply 1 application  topically to the appropriate area as directed Daily As Needed for Wound Care.       castor oil-balsam peru (VENELEX) ointment Apply 1 application  topically to the appropriate area as directed Daily.       dicyclomine (BENTYL) 20 MG tablet Take 1 tablet by mouth 3 (Three) Times a Day As Needed for Abdominal Cramping.       DULoxetine (CYMBALTA) 30 MG capsule Take 1 capsule by mouth Daily. 90 capsule 1     furosemide (LASIX) 20 MG tablet Take 1 tablet by mouth Daily. 30 tablet 3     HYDROcodone-acetaminophen (NORCO)  MG per tablet Take 1 tablet by mouth 3 (Three) Times a Day. 9 tablet 0     hydrocortisone-zinc oxide-bacitracin-nystatin cream Apply 1 application topically to the appropriate area as directed 2 (Two) Times a Day As Needed (rash). 5 g 0     Insulin Glargine, 1 Unit Dial, (TOUJEO) 300 UNIT/ML solution pen-injector injection Inject 25 Units under the skin into the appropriate area as directed 2 (Two) Times a Day.       insulin lispro (HUMALOG/ADMELOG) 100 UNIT/ML injection Inject 2-7 Units under the skin into the appropriate area as directed 4 (Four) Times a Day Before Meals & at Bedtime.       insulin lispro (HUMALOG/ADMELOG) 100 UNIT/ML injection Inject 5 Units under the skin into the appropriate area as directed 3 (Three) Times a Day With Meals.       lansoprazole (PREVACID) 30 MG capsule TAKE 1 CAPSULE DAILY 30 capsule 11     levothyroxine (SYNTHROID, LEVOTHROID) 112 MCG tablet levothyroxine 112 mcg 1 tablet daily, except on Sunday take 1.5 tablets. (Patient taking differently: Take 1 tablet by mouth See Admin Instructions. Take everyday EXCEPT Sunday take 1.5 tab) 96 tablet 0     levothyroxine (SYNTHROID, LEVOTHROID) 112 MCG tablet Take 1.5 tablets by mouth 1 (One)  Time Per Week. Take on Sunday       lisinopril (PRINIVIL,ZESTRIL) 40 MG tablet Take 1 tablet by mouth Daily. 90 tablet 1     multivitamin with minerals (CertaVite Senior) tablet tablet Take 1 tablet by mouth Daily.       naloxone (NARCAN) 4 MG/0.1ML nasal spray Call 911. Don't prime. [place] in 1 nostril for overdose. Repeat in 2-3 minutes in other nostril if no or minimal breathing/responsiveness. 2 each 0     pregabalin (LYRICA) 50 MG capsule Take 1 capsule by mouth 3 (Three) Times a Day. 9 capsule 0     vitamin B-12 (VITAMIN B-12) 1000 MCG tablet Take 1 tablet by mouth Daily.      , Scheduled Meds:  atorvastatin, 80 mg, Oral, Nightly  bisoprolol, 5 mg, Oral, Daily  cefTRIAXone, 2,000 mg, Intravenous, Q24H  [START ON 1/29/2024] famotidine, 20 mg, Oral, Daily  insulin lispro, 2-7 Units, Subcutaneous, 4x Daily AC & at Bedtime  [START ON 1/29/2024] levothyroxine, 112 mcg, Oral, Once per day on Monday Tuesday Wednesday Thursday Friday Saturday  levothyroxine, 168 mcg, Oral, Weekly  multivitamin with minerals, 1 tablet, Oral, Daily  pregabalin, 50 mg, Oral, Daily  vancomycin, 125 mg, Oral, Q6H  cyanocobalamin, 1,000 mcg, Oral, Daily   , Continuous Infusions:  Pharmacy Consult,   Pharmacy Consult,   sodium bicarbonate, 150 mEq, Last Rate: 150 mEq (01/28/24 1650)   , PRN Meds:    acetaminophen    cadexomer iodine    dextrose    dextrose    glucagon (human recombinant)    HYDROcodone-acetaminophen    melatonin    ondansetron ODT **OR** ondansetron    Pharmacy Consult    Pharmacy Consult, and Allergies:  Sulfa antibiotics    Objective     Vital Signs  Temp:  [98.1 °F (36.7 °C)-100.2 °F (37.9 °C)] 99.3 °F (37.4 °C)  Heart Rate:  [80-84] 80  Resp:  [16] 16  BP: ()/(49-72) 89/72    No intake/output data recorded.  I/O last 3 completed shifts:  In: 1050 [IV Piggyback:1050]  Out: -     Physical Exam  Constitutional:       Appearance: She is ill-appearing.   HENT:      Nose: Nose normal.      Mouth/Throat:      Mouth: Mucous  "membranes are moist.   Eyes:      Pupils: Pupils are equal, round, and reactive to light.   Cardiovascular:      Rate and Rhythm: Normal rate and regular rhythm.   Pulmonary:      Effort: Pulmonary effort is normal.      Breath sounds: Normal breath sounds.   Abdominal:      General: Abdomen is flat.      Palpations: Abdomen is soft.   Musculoskeletal:      Right lower leg: No edema.      Left lower leg: No edema.   Neurological:      General: No focal deficit present.      Mental Status: She is alert. She is disoriented.         Results Review:    I reviewed the patient's new clinical results.  I reviewed the patient's other test results and agree with the interpretation    WBC WBC   Date Value Ref Range Status   01/28/2024 20.38 (H) 3.40 - 10.80 10*3/mm3 Final   01/27/2024 29.84 (H) 3.40 - 10.80 10*3/mm3 Final      HGB Hemoglobin   Date Value Ref Range Status   01/28/2024 10.4 (L) 12.0 - 15.9 g/dL Final   01/27/2024 12.5 12.0 - 15.9 g/dL Final      HCT Hematocrit   Date Value Ref Range Status   01/28/2024 31.3 (L) 34.0 - 46.6 % Final   01/27/2024 39.1 34.0 - 46.6 % Final      Platlets No results found for: \"LABPLAT\"   MCV MCV   Date Value Ref Range Status   01/28/2024 83.7 79.0 - 97.0 fL Final   01/27/2024 88.9 79.0 - 97.0 fL Final          Sodium Sodium   Date Value Ref Range Status   01/28/2024 139 136 - 145 mmol/L Final   01/27/2024 137 136 - 145 mmol/L Final      Potassium Potassium   Date Value Ref Range Status   01/28/2024 4.0 3.5 - 5.2 mmol/L Final   01/27/2024 4.5 3.5 - 5.2 mmol/L Final      Chloride Chloride   Date Value Ref Range Status   01/28/2024 110 (H) 98 - 107 mmol/L Final   01/27/2024 104 98 - 107 mmol/L Final      CO2 CO2   Date Value Ref Range Status   01/28/2024 13.3 (L) 22.0 - 29.0 mmol/L Final   01/27/2024 14.0 (L) 22.0 - 29.0 mmol/L Final      BUN BUN   Date Value Ref Range Status   01/28/2024 67 (H) 8 - 23 mg/dL Final   01/27/2024 68 (H) 8 - 23 mg/dL Final      Creatinine Creatinine   Date " "Value Ref Range Status   01/28/2024 4.05 (H) 0.57 - 1.00 mg/dL Final   01/27/2024 4.99 (H) 0.57 - 1.00 mg/dL Final      Calcium Calcium   Date Value Ref Range Status   01/28/2024 7.6 (L) 8.6 - 10.5 mg/dL Final   01/27/2024 9.0 8.6 - 10.5 mg/dL Final      PO4 No results found for: \"CAPO4\"   Albumin Albumin   Date Value Ref Range Status   01/27/2024 3.1 (L) 3.5 - 5.2 g/dL Final      Magnesium Magnesium   Date Value Ref Range Status   01/27/2024 2.1 1.6 - 2.4 mg/dL Final      Uric Acid No results found for: \"URICACID\"         Assessment & Plan       Sepsis    Type 2 diabetes mellitus with hyperglycemia, with long-term current use of insulin    Hyperlipidemia    Benign essential hypertension    Seizure    VIPUL (acute kidney injury)    Acute UTI (urinary tract infection)    Neuropathic pain    C. difficile diarrhea    Stage 3a chronic kidney disease    Metabolic encephalopathy    Pressure injury of buttock, stage 2      Assessment & Plan  VIPUL-  creatinine improved from 4.99->4.05 today.  Likely volume depletion from diarrhe and poor po intake.  Checking UA and urine lytes.  Holding lasix and lisinopril at this time.  Met Acidosis-  Increased AG with initial lactate elevated but improved to 1.6 now.  Likely combination of uremia from VIPUL/lactate and some GI loss.  Change to bicarb gtt.  C.diff +-  on oral vanc.    Confusion-  metabolic enceph, appears improved from previous description.  Had been on cymbalta, lyrica which likely contributing with VIPUL.  DMII-  avoid metformin.  HTN-  bp on low side, holding home bp meds.        I discussed the patients findings and my recommendations with patient and nursing staff    Raffaele Stevens MD  01/28/24  18:37 EST          "

## 2024-01-28 NOTE — PLAN OF CARE
Goal Outcome Evaluation:  Plan of Care Reviewed With: patient         Pt admitted with altered mental status and she was admitted earlier this month and treated for covid and a diabetic foot ulcer. This visit she needed maxA with bed mobility and needed maxA with sitting balance. She was able to do some leg exercises sitting edge of bed, limited by weakness. Anticipate d/c to snf at this time.          Anticipated Discharge Disposition (PT): skilled nursing facility

## 2024-01-29 ENCOUNTER — TELEPHONE (OUTPATIENT)
Dept: INFECTIOUS DISEASES | Facility: CLINIC | Age: 84
End: 2024-01-29
Payer: MEDICARE

## 2024-01-29 LAB
ALBUMIN SERPL-MCNC: 2.4 G/DL (ref 3.5–5.2)
ANION GAP SERPL CALCULATED.3IONS-SCNC: 16.7 MMOL/L (ref 5–15)
BASOPHILS # BLD AUTO: 0.15 10*3/MM3 (ref 0–0.2)
BASOPHILS NFR BLD AUTO: 0.7 % (ref 0–1.5)
BUN SERPL-MCNC: 61 MG/DL (ref 8–23)
BUN/CREAT SERPL: 19.6 (ref 7–25)
CALCIUM SPEC-SCNC: 7.5 MG/DL (ref 8.6–10.5)
CHLORIDE SERPL-SCNC: 107 MMOL/L (ref 98–107)
CO2 SERPL-SCNC: 15.3 MMOL/L (ref 22–29)
CREAT SERPL-MCNC: 3.12 MG/DL (ref 0.57–1)
CREAT UR-MCNC: 130.7 MG/DL
DEPRECATED RDW RBC AUTO: 50.3 FL (ref 37–54)
EGFRCR SERPLBLD CKD-EPI 2021: 14.3 ML/MIN/1.73
EOSINOPHIL # BLD AUTO: 0.4 10*3/MM3 (ref 0–0.4)
EOSINOPHIL NFR BLD AUTO: 1.8 % (ref 0.3–6.2)
ERYTHROCYTE [DISTWIDTH] IN BLOOD BY AUTOMATED COUNT: 16.3 % (ref 12.3–15.4)
GLUCOSE BLDC GLUCOMTR-MCNC: 142 MG/DL (ref 70–130)
GLUCOSE BLDC GLUCOMTR-MCNC: 163 MG/DL (ref 70–130)
GLUCOSE BLDC GLUCOMTR-MCNC: 191 MG/DL (ref 70–130)
GLUCOSE BLDC GLUCOMTR-MCNC: 201 MG/DL (ref 70–130)
GLUCOSE SERPL-MCNC: 117 MG/DL (ref 65–99)
HCT VFR BLD AUTO: 33.5 % (ref 34–46.6)
HGB BLD-MCNC: 10.8 G/DL (ref 12–15.9)
LYMPHOCYTES # BLD AUTO: 1.38 10*3/MM3 (ref 0.7–3.1)
LYMPHOCYTES NFR BLD AUTO: 6.2 % (ref 19.6–45.3)
MAGNESIUM SERPL-MCNC: 2 MG/DL (ref 1.6–2.4)
MCH RBC QN AUTO: 27.6 PG (ref 26.6–33)
MCHC RBC AUTO-ENTMCNC: 32.2 G/DL (ref 31.5–35.7)
MCV RBC AUTO: 85.7 FL (ref 79–97)
MONOCYTES # BLD AUTO: 1.77 10*3/MM3 (ref 0.1–0.9)
MONOCYTES NFR BLD AUTO: 8 % (ref 5–12)
NEUTROPHILS NFR BLD AUTO: 18.06 10*3/MM3 (ref 1.7–7)
NEUTROPHILS NFR BLD AUTO: 81.1 % (ref 42.7–76)
PHOSPHATE SERPL-MCNC: 4.8 MG/DL (ref 2.5–4.5)
PLATELET # BLD AUTO: 338 10*3/MM3 (ref 140–450)
PMV BLD AUTO: 9.9 FL (ref 6–12)
POTASSIUM SERPL-SCNC: 3.1 MMOL/L (ref 3.5–5.2)
RBC # BLD AUTO: 3.91 10*6/MM3 (ref 3.77–5.28)
SODIUM SERPL-SCNC: 139 MMOL/L (ref 136–145)
SODIUM UR-SCNC: 41 MMOL/L
WBC NRBC COR # BLD AUTO: 22.24 10*3/MM3 (ref 3.4–10.8)

## 2024-01-29 PROCEDURE — 97165 OT EVAL LOW COMPLEX 30 MIN: CPT

## 2024-01-29 PROCEDURE — 85025 COMPLETE CBC W/AUTO DIFF WBC: CPT | Performed by: HOSPITALIST

## 2024-01-29 PROCEDURE — 84300 ASSAY OF URINE SODIUM: CPT | Performed by: INTERNAL MEDICINE

## 2024-01-29 PROCEDURE — 83735 ASSAY OF MAGNESIUM: CPT | Performed by: HOSPITALIST

## 2024-01-29 PROCEDURE — 80069 RENAL FUNCTION PANEL: CPT | Performed by: HOSPITALIST

## 2024-01-29 PROCEDURE — 82948 REAGENT STRIP/BLOOD GLUCOSE: CPT

## 2024-01-29 PROCEDURE — 63710000001 INSULIN LISPRO (HUMAN) PER 5 UNITS: Performed by: INTERNAL MEDICINE

## 2024-01-29 PROCEDURE — 99223 1ST HOSP IP/OBS HIGH 75: CPT | Performed by: INTERNAL MEDICINE

## 2024-01-29 PROCEDURE — 0 DEXTROSE 5 % SOLUTION: Performed by: INTERNAL MEDICINE

## 2024-01-29 PROCEDURE — 25810000003 LACTATED RINGERS PER 1000 ML: Performed by: INTERNAL MEDICINE

## 2024-01-29 PROCEDURE — 82570 ASSAY OF URINE CREATININE: CPT | Performed by: INTERNAL MEDICINE

## 2024-01-29 PROCEDURE — 25010000002 CEFTRIAXONE PER 250 MG: Performed by: INTERNAL MEDICINE

## 2024-01-29 RX ORDER — SODIUM CHLORIDE, SODIUM LACTATE, POTASSIUM CHLORIDE, CALCIUM CHLORIDE 600; 310; 30; 20 MG/100ML; MG/100ML; MG/100ML; MG/100ML
100 INJECTION, SOLUTION INTRAVENOUS CONTINUOUS
Status: DISCONTINUED | OUTPATIENT
Start: 2024-01-29 | End: 2024-02-01

## 2024-01-29 RX ORDER — VANCOMYCIN HYDROCHLORIDE 125 MG/1
125 CAPSULE ORAL EVERY OTHER DAY
Status: DISCONTINUED | OUTPATIENT
Start: 2024-02-22 | End: 2024-01-31

## 2024-01-29 RX ORDER — POTASSIUM CHLORIDE 750 MG/1
40 TABLET, FILM COATED, EXTENDED RELEASE ORAL ONCE
Status: COMPLETED | OUTPATIENT
Start: 2024-01-29 | End: 2024-01-29

## 2024-01-29 RX ORDER — VANCOMYCIN HYDROCHLORIDE 125 MG/1
125 CAPSULE ORAL EVERY 12 HOURS
Status: DISCONTINUED | OUTPATIENT
Start: 2024-02-08 | End: 2024-01-31

## 2024-01-29 RX ORDER — VANCOMYCIN HYDROCHLORIDE 125 MG/1
125 CAPSULE ORAL EVERY 24 HOURS
Status: DISCONTINUED | OUTPATIENT
Start: 2024-02-15 | End: 2024-01-31

## 2024-01-29 RX ORDER — CASTOR OIL AND BALSAM, PERU 788; 87 MG/G; MG/G
1 OINTMENT TOPICAL EVERY 12 HOURS SCHEDULED
Status: DISCONTINUED | OUTPATIENT
Start: 2024-01-29 | End: 2024-02-10 | Stop reason: HOSPADM

## 2024-01-29 RX ORDER — VANCOMYCIN HYDROCHLORIDE 125 MG/1
125 CAPSULE ORAL EVERY 6 HOURS SCHEDULED
Status: DISCONTINUED | OUTPATIENT
Start: 2024-01-29 | End: 2024-01-31

## 2024-01-29 RX ADMIN — INSULIN LISPRO 2 UNITS: 100 INJECTION, SOLUTION INTRAVENOUS; SUBCUTANEOUS at 09:39

## 2024-01-29 RX ADMIN — POTASSIUM CHLORIDE 40 MEQ: 750 TABLET, EXTENDED RELEASE ORAL at 15:18

## 2024-01-29 RX ADMIN — HYDROCODONE BITARTRATE AND ACETAMINOPHEN 1 TABLET: 10; 325 TABLET ORAL at 05:21

## 2024-01-29 RX ADMIN — VANCOMYCIN HYDROCHLORIDE 125 MG: 125 CAPSULE ORAL at 05:21

## 2024-01-29 RX ADMIN — VANCOMYCIN HYDROCHLORIDE 125 MG: 125 CAPSULE ORAL at 13:20

## 2024-01-29 RX ADMIN — FAMOTIDINE 20 MG: 20 TABLET, FILM COATED ORAL at 09:39

## 2024-01-29 RX ADMIN — LEVOTHYROXINE SODIUM 112 MCG: 112 TABLET ORAL at 09:39

## 2024-01-29 RX ADMIN — VANCOMYCIN HYDROCHLORIDE 125 MG: 125 CAPSULE ORAL at 18:02

## 2024-01-29 RX ADMIN — SODIUM CHLORIDE, POTASSIUM CHLORIDE, SODIUM LACTATE AND CALCIUM CHLORIDE 100 ML/HR: 600; 310; 30; 20 INJECTION, SOLUTION INTRAVENOUS at 22:17

## 2024-01-29 RX ADMIN — CEFTRIAXONE 2000 MG: 2 INJECTION, POWDER, FOR SOLUTION INTRAMUSCULAR; INTRAVENOUS at 21:25

## 2024-01-29 RX ADMIN — CASTOR OIL AND BALSAM, PERU 1 APPLICATION: 788; 87 OINTMENT TOPICAL at 15:18

## 2024-01-29 RX ADMIN — SODIUM BICARBONATE 150 MEQ: 84 INJECTION, SOLUTION INTRAVENOUS at 03:54

## 2024-01-29 RX ADMIN — INSULIN LISPRO 3 UNITS: 100 INJECTION, SOLUTION INTRAVENOUS; SUBCUTANEOUS at 18:02

## 2024-01-29 RX ADMIN — ATORVASTATIN CALCIUM 80 MG: 80 TABLET, FILM COATED ORAL at 21:24

## 2024-01-29 RX ADMIN — Medication 1 TABLET: at 09:40

## 2024-01-29 RX ADMIN — CASTOR OIL AND BALSAM, PERU 1 APPLICATION: 788; 87 OINTMENT TOPICAL at 21:25

## 2024-01-29 RX ADMIN — PREGABALIN 50 MG: 50 CAPSULE ORAL at 09:40

## 2024-01-29 RX ADMIN — SODIUM BICARBONATE 150 MEQ: 84 INJECTION, SOLUTION INTRAVENOUS at 09:40

## 2024-01-29 RX ADMIN — ANORECTAL OINTMENT 1 APPLICATION: 15.7; .44; 24; 20.6 OINTMENT TOPICAL at 18:02

## 2024-01-29 RX ADMIN — Medication 1000 MCG: at 09:40

## 2024-01-29 RX ADMIN — INSULIN LISPRO 2 UNITS: 100 INJECTION, SOLUTION INTRAVENOUS; SUBCUTANEOUS at 21:24

## 2024-01-29 RX ADMIN — ANORECTAL OINTMENT 1 APPLICATION: 15.7; .44; 24; 20.6 OINTMENT TOPICAL at 21:25

## 2024-01-29 RX ADMIN — HYDROCODONE BITARTRATE AND ACETAMINOPHEN 1 TABLET: 10; 325 TABLET ORAL at 21:25

## 2024-01-29 NOTE — PLAN OF CARE
Goal Outcome Evaluation:      Plan of Care Reviewed With: patient        Progress: no change  Outcome Evaluation: Pt. confused. 1L NC applied following desat into 80s. Fever of 100.8 at hs. PRN tylenol given. Low grade in the 99s at MN recheck. Blood sugar at hs of 222. 3 units ssi given. CT of abdomen completed. Rectal tube in place at start of shift. Popped out after one of several loose BMs this shift. Bladder scanned after no voids. >999 showing. Straight cath for 900 around 0400. PRN norco for pain following straight cath. AM labs show K - 3.1, but cr - 3.12. MD notified.

## 2024-01-29 NOTE — CASE MANAGEMENT/SOCIAL WORK
Continued Stay Note  [facility]     Patient Name: Halie Guidry  MRN: [id number]  Today's Date: 1/29/2024    Admit Date: 1/27/2024    Plan: Return to [facility] pending bed & precert   Discharge Plan       Row Name 01/29/24 1020       Plan    Plan Return to [facility] pending bed & precert    Plan Comments CCP spoke with patient's son Josse ([phone]) who confirms plan is for patient to return to [facility] at [place]. [facility] can accept pending bed availability and precert. Patient will need transport. CCP following for Palliative meeting for updated goals of care. SAFIA RAMOS      Row Name 01/29/24 6639       Plan    Plan Pending    Patient/Family in Agreement with Plan yes    Plan Comments Clinical chart reviewed; patient was admitted from [facility]. CCP spoke with Sriram who states patient is from a skilled bed and can return pending bed availability and [facility] precert. CCP left M's for both sons - Derrick & Franco to confirm [place] plan. Patient transported via wheelchair van last admission. Palliative consult noted - CCP following for updates goals of care. SAFIA RAMOS                   Discharge Codes    No documentation.                       SAFIA Tellez

## 2024-01-29 NOTE — CASE MANAGEMENT/SOCIAL WORK
Discharge Planning Assessment  [facility]     Patient Name: Halie Guidry  MRN: [id number]  Today's Date: 1/29/2024    Admit Date: 1/27/2024    Plan: Pending   Discharge Needs Assessment       Row Name 01/29/24 0939       Living Environment    People in Home child(beatriz), adult    Current Living Arrangements home    Primary Care Provided by self    Provides Primary Care For no one    Family Caregiver if Needed child(beatriz), adult    Able to Return to Prior Arrangements yes       Resource/Environmental Concerns    Resource/Environmental Concerns none       Transition Planning    Patient/Family Anticipates Transition to inpatient rehabilitation facility    Patient/Family Anticipated Services at Transition skilled nursing;rehabilitation services    Transportation Anticipated health plan transportation       Discharge Needs Assessment    Concerns to be Addressed discharge planning                   Discharge Plan       Row Name 01/29/24 0939       Plan    Plan Pending    Patient/Family in Agreement with Plan yes    Plan Comments Clinical chart reviewed; patient was admitted from [facility]. CCP spoke with Mame/Jim who states patient is from a skilled bed and can return pending bed availability and [facility] MCR precert. CCP left VMM's for both sons - Derrick & Franco to confirm dc plan. Patient transported via wheelchair van last admission. Palliative consult noted - CCP following for updates goals of care. RICHARD, CSW                  Continued Care and Services - Admitted Since 1/27/2024       Destination       Service Provider Request Status Selected Services Address Phone Fax Patient Preferred    [facility] Pending - Request Sent N/A [address] [facility] [address] [phone] --                  Selected Continued Care - Prior Encounters Includes continued care and service providers with selected services from prior encounters from 10/29/2023 to 1/29/2024       Discharged on 1/15/2024 Admission date: 1/9/2024 - Discharge disposition: Skilled Nursing Facility (DC - External)      Destination       Service Provider Selected Services Address Phone Fax Patient Preferred    [facility] [address] [facility] [address] [phone] --                      Discharged on 12/30/2023 Admission date: 12/24/2023 - Discharge disposition: Skilled Nursing Facility (DC - External)      Destination       Service Provider Selected Services Address Phone Fax Patient Preferred    [facility] [address] [facility] [facility] [address] [phone] --                             Demographic Summary       Row Name 01/29/24 0938       General Information    Admission Type inpatient    Referral Source admission list    Reason for Consult discharge planning    Preferred Language English       Contact Information    Permission Granted to Share Info With family/designee                   Functional Status       Row Name 01/29/24 0939       Functional Status    Usual Activity Tolerance moderate    Current Activity Tolerance poor       Functional Status, IADL    Medications assistive equipment and person    Meal Preparation assistive equipment and person    Housekeeping assistive equipment and person    Laundry assistive equipment and person    Shopping assistive equipment and person       Mental Status    General Appearance WDL WDL                   Psychosocial    No documentation.                  Abuse/Neglect    No documentation.                  Legal    No documentation.                  Substance Abuse    No documentation.                  Patient Forms    No documentation.                     SAFIA Tellez

## 2024-01-29 NOTE — PLAN OF CARE
Goal Outcome Evaluation:  Plan of Care Reviewed With: patient           Outcome Evaluation: Pt is an 82 y/o F who presented to ER from NH with increased confusion and lethargy and admitted to [facility] with COVID, diabetic foot ulcer, and c.diff+. PMHx significant for CKD, DM2, HLD, anxiety, HTN, L disc degeneration, and acute metabolic encephalopathy. Pt lives at facility and reports being IND with ADLs using Rwx at [place], however, pt may be poor historian. Pt was supine in bed on OT arrival and agreeable to evaluation this date. Pt presents with deficits in BUE/BLE strength, LUE A/PROM, and decreased endurance, act suzanne, and safety awareness affecting IND with ADLs. Skilled OT services are medically indicated in order to address aforementioned skills and deficits and improve QOL. Anticipate return to [facility] at [place] and f/u with therapy at facility.      Anticipated Discharge Disposition (OT): skilled nursing facility, extended care facility, home with assist

## 2024-01-29 NOTE — THERAPY EVALUATION
Patient Name: Halie Guidry  : 1940    MRN: [id number]                              Today's Date: 2024       Admit Date: 2024    Visit Dx:     ICD-10-CM ICD-9-CM   1. Sepsis with acute renal failure, due to unspecified organism, unspecified acute renal failure type, unspecified whether septic shock present  A41.9 038.9    R65.20 995.92    N17.9 584.9   2. Encephalopathy  G93.40 348.30     Patient Active Problem List   Diagnosis    Compression fracture    Lumbar degenerative disc disease    Type 2 diabetes mellitus with hyperglycemia, with long-term current use of insulin    Hyperlipidemia    Benign essential hypertension    Primary hypothyroidism    Neuropathy    Osteoporosis    Proteinuria    Tobacco abuse    Diabetic eye exam    Generalized weakness    Spinal stenosis    Scoliosis    Arthritis    VBI (vertebrobasilar insufficiency)    Orthostatic hypotension    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    Severe hypothyroidism    Noncompliance with medication regimen    Vitamin D deficiency disease    Altered mental status    Tremor    Seizure    Stage 3a chronic kidney disease    Thoracic degenerative disc disease    Metabolic encephalopathy    VIPUL (acute kidney injury)    Hyperglycemia    Type II diabetes mellitus, uncontrolled    Lower abdominal pain    Transaminitis    UTI (urinary tract infection)    Emphysematous cystitis    Choledocholithiasis    Hypokalemia    Hypoxia    Generalized abdominal pain    History of Clostridium difficile infection    History of ERCP    Acute UTI (urinary tract infection)    Hypomagnesemia    Burning pain    Anxiety disorder    Neuropathic pain    Intertrigo    Weakness of both lower extremities    Accelerated hypertension    Acute cystitis without hematuria    Altered mental status, unspecified altered mental status type    Left lower lobe pneumonia    Acute pain of left foot    Cellulitis and abscess of left lower extremity    Hypertensive  kidney disease with stage 3a chronic kidney disease    Bilateral leg pain    Peripheral edema    Primary malignant neuroendocrine tumor of pancreas    Encounter for long-term (current) use of high-risk medication    Diabetic foot ulcer    Acute renal insufficiency    C. difficile diarrhea    Sepsis    Stage 3a chronic kidney disease    Metabolic encephalopathy    Pressure injury of buttock, stage 2     Past Medical History:   Diagnosis Date    Acute metabolic encephalopathy 6/24/2022    Anxiety     Arthritis     Benign essential hypertension 08/20/2014    Bleeding disorder     Depression     Diabetes     Diabetes mellitus, type 2     Disc degeneration, lumbar     Headache, tension-type     Hyperlipidemia     Hypothyroidism     Neuropathy     Osteoporosis 09/09/2015    Peripheral neuropathy     Rotator cuff tear, left     Scoliosis     Shoulder pain     LEFT, TORN ROTATOR CUFF S/P FALL    Spinal stenosis      Past Surgical History:   Procedure Laterality Date    APPENDECTOMY      BILATERAL BREAST REDUCTION Bilateral 08/2015    CATARACT EXTRACTION  03/2015    CHOLECYSTECTOMY WITH INTRAOPERATIVE CHOLANGIOGRAM N/A 3/27/2022    Procedure: CHOLECYSTECTOMY LAPAROSCOPIC INTRAOPERATIVE CHOLANGIOGRAM;  Surgeon: Aiyana Hill MD;  Location: [facility];  Service: General;  Laterality: N/A;    COLONOSCOPY  06/05/2015    WNL    ERCP N/A 2/25/2022    Procedure: ENDOSCOPIC RETROGRADE CHOLANGIOPANCREATOGRAPHY with sphincterotomy and balloon sweep;  Surgeon: Chilo Wilhelm MD;  Location: [facility] ENDOSCOPY;  Service: Gastroenterology;  Laterality: N/A;  PRE/POST - CBD stones    ERCP N/A 3/28/2022    Procedure: ENDOSCOPIC RETROGRADE CHOLANGIOPANCREATOGRAPHY WITH SPHINCTEROTOMY AND BALLOON SWEEP;  Surgeon: Chilo Wilhelm MD;  Location: [facility] ENDOSCOPY;  Service: Gastroenterology;  Laterality: N/A;  PRE: COMMON DUCT STONE  POST: COMMON DUCT STONE    KYPHOPLASTY      REDUCTION MAMMAPLASTY      TONSILLECTOMY         General Information       Row Name 01/29/24 1649          OT Time and Intention    Document Type evaluation  -SELAM     Mode of Treatment occupational therapy;individual therapy  -SELAM       Row Name 01/29/24 1649          General Information    Patient Profile Reviewed yes  -SELAM     Prior Level of Function independent:  -SELAM     Existing Precautions/Restrictions fall;oxygen therapy device and L/min  -SELAM     Barriers to Rehab previous functional deficit  -SELAM       Row Name 01/29/24 1649          Living Environment    People in Home facility resident  -SELAM       Row Name 01/29/24 1649          Cognition    Orientation Status (Cognition) oriented to;person;place  -SELAM       Row Name 01/29/24 1649          Safety Issues, Functional Mobility    Safety Issues Affecting Function (Mobility) judgment;insight into deficits/self-awareness;ability to follow commands;awareness of need for assistance;problem-solving;safety precautions follow-through/compliance  -SELAM     Impairments Affecting Function (Mobility) endurance/activity tolerance;shortness of breath;strength;balance;cognition;pain  -SELAM     Cognitive Impairments, Mobility Safety/Performance awareness, need for assistance;attention;insight into deficits/self-awareness;judgment;problem-solving/reasoning;safety precaution awareness;safety precaution follow-through  -SELAM               User Key  (r) = Recorded By, (t) = Taken By, (c) = Cosigned By      Initials Name Provider Type    SELAM Radha So OT Occupational Therapist                     Mobility/ADL's       Row Name 01/29/24 4365          Bed Mobility    Bed Mobility scooting/bridging;rolling left  -SELAM     Rolling Left [place] (Bed Mobility) maximum assist (25% patient effort);verbal cues;1 person assist;nonverbal cues (demo/gesture)  -SELAM     Scooting/Bridging [place] (Bed Mobility) verbal cues;nonverbal cues (demo/gesture);maximum assist (25% patient effort);1 person assist  -SELAM     Assistive Device (Bed Mobility)  bed rails;head of bed elevated  -SELAM     Comment, (Bed Mobility) Pt declined sitting EOB this date d/t pain and fatigue despite encouragement. Pt req frequent cues to keep eyes open througout evaluation.  -SELAM       Row Name 01/29/24 1650          Transfers    Transfers other (see comments)  -SELAM     Comment, (Transfers) Declined all transfers and mobility at this time d/t pain and fatigue.  -SELAM       Row Name 01/29/24 1650          Functional Mobility    Functional Mobility- Ind. Level unable to perform;not tested  -SELAM     Functional Mobility- Comment Declined all transfers and mobility at this time d/t pain and fatigue.  -SELAM       Row Name 01/29/24 1650          Activities of Daily Living    BADL Assessment/Intervention toileting;other (see comments);grooming  Pt declined further ADLs d/t pain and fatigue and dec attention noted throughout d/t dec level of alertness.  -SELAM       Row Name 01/29/24 1650          Toileting Assessment/Training    [place] Level (Toileting) dependent (less than 25% patient effort)  -SELAM     Comment, (Toileting) Wearing brief and reports unable to control bowels and bladder at this time d/t diarrhea and c.diff+  -SELAM       Row Name 01/29/24 1650          Grooming Assessment/Training    [place] Level (Grooming) grooming skills;oral care regimen;set up;moderate assist (50% patient effort);maximum assist (25% patient effort);verbal cues  -SELAM     Position (Grooming) sitting up in bed  -SELAM     Comment, (Grooming) Pt removed lower dentures with cues for follow through with task  -SELAM               User Key  (r) = Recorded By, (t) = Taken By, (c) = Cosigned By      Initials Name Provider Type    SELAM Radha So OT Occupational Therapist                   Obj/Interventions       Row Name 01/29/24 1657          Sensory Assessment (Somatosensory)    Sensory Assessment (Somatosensory) sensation intact  -SELAM       Row Name 01/29/24 1657          Vision Assessment/Intervention    Visual  Impairment/Limitations WFL  -SELAM       Row Name 01/29/24 1657          Range of Motion Comprehensive    General Range of Motion upper extremity range of motion deficits identified  -SELAM     Comment, General Range of Motion LUE A/PROM 1/2 d/t hx rotator cuff injury; RUE WFL  -SELAM       Row Name 01/29/24 1657          Strength Comprehensive (MMT)    General Manual Muscle Testing (MMT) Assessment upper extremity strength deficits identified  -SELAM     Comment, General Manual Muscle Testing (MMT) Assessment Generalized weakness; unable to fully assess BUE strength d/t dec level or alertness and fatigue; B elbows and hands 3+/5  -SELAM       Row Name 01/29/24 1657          Motor Skills    Motor Skills functional endurance  -SELAM     Functional Endurance Poor  -SELAM       Row Name 01/29/24 1657          Balance    Comment, Balance Unable to assess at this time d/t pt declining mobility secondary to pain and fatigue  -SELAM               User Key  (r) = Recorded By, (t) = Taken By, (c) = Cosigned By      Initials Name Provider Type    Radha Soni OT Occupational Therapist                   Goals/Plan       Row Name 01/29/24 1700          Bed Mobility Goal 1 (OT)    Activity/Assistive Device (Bed Mobility Goal 1, OT) bed mobility activities, all  -SELAM     [place] Level/Cues Needed (Bed Mobility Goal 1, OT) moderate assist (50-74% patient effort);minimum assist (75% or more patient effort);verbal cues required;nonverbal cues (demo/gesture) required  -SELAM     Time Frame (Bed Mobility Goal 1, OT) short term goal (STG);2 weeks  -SELAM     Progress/Outcomes (Bed Mobility Goal 1, OT) new goal  -SELAM       Row Name 01/29/24 1700          Transfer Goal 1 (OT)    Activity/Assistive Device (Transfer Goal 1, OT) transfers, all  -SELAM     [place] Level/Cues Needed (Transfer Goal 1, OT) verbal cues required;moderate assist (50-74% patient effort);maximum assist (25-49% patient effort)  -SELAM     Time Frame (Transfer Goal 1, OT) short term goal  (STG);2 weeks  -SELAM     Progress/Outcome (Transfer Goal 1, OT) new goal  -SELAM       Row Name 01/29/24 1700          Dressing Goal 1 (OT)    Activity/Device (Dressing Goal 1, OT) dressing skills, all;upper body dressing;lower body dressing  -SELAM     [place]/Cues Needed (Dressing Goal 1, OT) moderate assist (50-74% patient effort);minimum assist (75% or more patient effort);verbal cues required  -SELAM     Time Frame (Dressing Goal 1, OT) short term goal (STG);2 weeks  -SELAM     Progress/Outcome (Dressing Goal 1, OT) new goal  -SELAM       Row Name 01/29/24 1700          Toileting Goal 1 (OT)    Activity/Device (Toileting Goal 1, OT) toileting skills, all  -SELAM     [place] Level/Cues Needed (Toileting Goal 1, OT) moderate assist (50-74% patient effort);verbal cues required;set-up required  -SELAM     Time Frame (Toileting Goal 1, OT) short term goal (STG);2 weeks  -SELAM     Progress/Outcome (Toileting Goal 1, OT) new goal  -SELAM       Row Name 01/29/24 1700          Grooming Goal 1 (OT)    Activity/Device (Grooming Goal 1, OT) grooming skills, all  -SELAM     [place] (Grooming Goal 1, OT) set-up required;contact guard required;minimum assist (75% or more patient effort);verbal cues required  -SELAM     Time Frame (Grooming Goal 1, OT) short term goal (STG);2 weeks  -SELAM     Progress/Outcome (Grooming Goal 1, OT) new goal  -SELAM       Row Name 01/29/24 1700          Therapy Assessment/Plan (OT)    Planned Therapy Interventions (OT) activity tolerance training;BADL retraining;functional balance retraining;occupation/activity based interventions;patient/caregiver education/training;strengthening exercise;transfer/mobility retraining;adaptive equipment training;passive ROM/stretching;ROM/therapeutic exercise  -SELAM               User Key  (r) = Recorded By, (t) = Taken By, (c) = Cosigned By      Initials Name Provider Type    Radha Soni, OT Occupational Therapist                   Clinical Impression       Row Name 01/29/24 3321           Pain Assessment    Pain Location lower  -SELAM     Pain Location - back;abdomen  -SELAM     Pre/Posttreatment Pain Comment c/o stomach and LBP but did not rate on numeric pain scale. Pt decreased attention and required frequent cues for keep eyes open throughout evaluation.  -SELAM     Pain Intervention(s) Repositioned;Rest;Nursing Notified  -SELAM       Row Name 01/29/24 1651          Plan of Care Review    Plan of Care Reviewed With patient  -SELAM     Outcome Evaluation Pt is an 84 y/o F who presented to ER from NH with increased confusion and lethargy and admitted to [facility] with COVID, diabetic foot ulcer, and c.diff+. PMHx significant for CKD, DM2, HLD, anxiety, HTN, L disc degeneration, and acute metabolic encephalopathy. Pt lives at facility and reports being IND with ADLs using Rwx at [facility], however, pt may be poor historian. Pt was supine in bed on OT arrival and agreeable to evaluation this date. Pt presents with deficits in BUE/BLE strength, LUE A/PROM, and decreased endurance, act suzanne, and safety awareness affecting IND with ADLs. Skilled OT services are medically indicated in order to address aforementioned skills and deficits and improve QOL. Anticipate return to [facility]LTC at [facility] and f/u with therapy at facility.  -SELAM       Row Name 01/29/24 1651          Therapy Assessment/Plan (OT)    Rehab Potential (OT) fair, will monitor progress closely  -SELAM     Criteria for Skilled Therapeutic Interventions Met (OT) yes;skilled treatment is necessary  -SELAM     Therapy Frequency (OT) 3 times/wk  -SELAM       Row Name 01/29/24 1651          Therapy Plan Review/Discharge Plan (OT)    Anticipated Discharge Disposition (OT) skilled nursing facility;extended care facility;home with assist  -SELAM       Row Name 01/29/24 1651          Vital Signs    O2 Delivery Pre Treatment supplemental O2  -SELAM     O2 Delivery Intra Treatment supplemental O2  -SELAM     O2 Delivery Post Treatment supplemental O2  -SELAM       Row Name 01/29/24 1651           Positioning and Restraints    Pre-Treatment Position in bed  -SELAM     Post Treatment Position bed  -SELAM     In Bed notified nsg;supine;call light within reach;encouraged to call for assist;exit alarm on;side rails up x2;legs elevated  -SELAM               User Key  (r) = Recorded By, (t) = Taken By, (c) = Cosigned By      Initials Name Provider Type    Radha Soni OT Occupational Therapist                   Outcome Measures       Row Name 01/29/24 1701          How much help from another is currently needed...    Putting on and taking off regular lower body clothing? 1  -SELAM     Bathing (including washing, rinsing, and drying) 2  -SELAM     Toileting (which includes using toilet bed pan or urinal) 1  -SELAM     Putting on and taking off regular upper body clothing 2  -SELAM     Taking care of personal grooming (such as brushing teeth) 2  -SELAM     Eating meals 3  -SELAM     AM-PAC 6 Clicks Score (OT) 11  -SELAM       Row Name 01/29/24 0939          How much help from another person do you currently need...    Turning from your back to your side while in flat bed without using bedrails? 2  -LM     Moving from lying on back to sitting on the side of a flat bed without bedrails? 2  -LM     Moving to and from a bed to a chair (including a wheelchair)? 2  -LM     Standing up from a chair using your arms (e.g., wheelchair, bedside chair)? 1  -LM     Climbing 3-5 steps with a railing? 1  -LM     To walk in hospital room? 1  -LM     AM-PAC 6 Clicks Score (PT) 9  -LM     Highest Level of Mobility Goal 3 --> Sit at edge of bed  -LM       Row Name 01/29/24 1701          Functional Assessment    Outcome Measure Options AM-PAC 6 Clicks Daily Activity (OT)  -SELAM               User Key  (r) = Recorded By, (t) = Taken By, (c) = Cosigned By      Initials Name Provider Type    Margarita Christopher, RN Registered Nurse    Radha Soni OT Occupational Therapist                    Occupational Therapy Education       Title: PT OT SLP Therapies (Done)        Topic: Occupational Therapy (Done)       Point: ADL training (Done)       Description:   Instruct learner(s) on proper safety adaptation and remediation techniques during self care or transfers.   Instruct in proper use of assistive devices.                  Learning Progress Summary             Patient Acceptance, E, VU by SELAM at 1/29/2024 1702    Comment: Role of OT and safety awareness                         Point: Home exercise program (Done)       Description:   Instruct learner(s) on appropriate technique for monitoring, assisting and/or progressing therapeutic exercises/activities.                  Learning Progress Summary             Patient Acceptance, E, VU by SELAM at 1/29/2024 1702    Comment: Role of OT and safety awareness                         Point: Precautions (Done)       Description:   Instruct learner(s) on prescribed precautions during self-care and functional transfers.                  Learning Progress Summary             Patient Acceptance, E, VU by SELAM at 1/29/2024 1702    Comment: Role of OT and safety awareness                         Point: Body mechanics (Done)       Description:   Instruct learner(s) on proper positioning and spine alignment during self-care, functional mobility activities and/or exercises.                  Learning Progress Summary             Patient Acceptance, E, VU by SELAM at 1/29/2024 1702    Comment: Role of OT and safety awareness                                         User Key       Initials Effective Dates Name Provider Type Discipline    SELAM 10/12/23 -  Radha So OT Occupational Therapist OT                  OT Recommendation and Plan  Planned Therapy Interventions (OT): activity tolerance training, BADL retraining, functional balance retraining, occupation/activity based interventions, patient/caregiver education/training, strengthening exercise, transfer/mobility retraining, adaptive equipment training, passive ROM/stretching, ROM/therapeutic  exercise  Therapy Frequency (OT): 3 times/wk  Plan of Care Review  Plan of Care Reviewed With: patient  Outcome Evaluation: Pt is an 82 y/o F who presented to ER from NH with increased confusion and lethargy and admitted to [facility] with COVID, diabetic foot ulcer, and c.diff+. PMHx significant for CKD, DM2, HLD, anxiety, HTN, L disc degeneration, and acute metabolic encephalopathy. Pt lives at facility and reports being IND with ADLs using Rwx at [facility], however, pt may be poor historian. Pt was supine in bed on OT arrival and agreeable to evaluation this date. Pt presents with deficits in BUE/BLE strength, LUE A/PROM, and decreased endurance, act suzanne, and safety awareness affecting IND with ADLs. Skilled OT services are medically indicated in order to address aforementioned skills and deficits and improve QOL. Anticipate return [facility] and f/u with therapy at facility.     Time Calculation:   Evaluation Complexity (OT)  Review Occupational Profile/Medical/Therapy History Complexity: brief/low complexity  Assessment, Occupational Performance/Identification of Deficit Complexity: 1-3 performance deficits  Clinical Decision Making Complexity (OT): problem focused assessment/low complexity  Overall Complexity of Evaluation (OT): low complexity     Time Calculation- OT       Row Name 01/29/24 1702             Time Calculation- OT    OT Start Time 1551  -SELAM      OT Stop Time 1605  -SELAM      OT Time Calculation (min) 14 min  -SELAM      OT Received On 01/29/24  -SELAM      OT - Next Appointment 01/31/24  -SELAM      OT Goal Re-Cert Due Date 02/12/24  -SELAM         Untimed Charges    OT Eval/Re-eval Minutes 14  -SELAM         Total Minutes    Untimed Charges Total Minutes 14  -SELAM       Total Minutes 14  -SELAM                User Key  (r) = Recorded By, (t) = Taken By, (c) = Cosigned By      Initials Name Provider Type    Radha Soni OT Occupational Therapist                  Therapy Charges for Today       Code Description Service  Date Service Provider Modifiers Qty    [id number] HC OT EVAL LOW COMPLEXITY 2 1/29/2024 Radha So OT GO 1                 Radha So OT  1/29/2024

## 2024-01-29 NOTE — CONSULTS
"Referring Provider: Dr. Nba Case    Reason for Consultation: Fidaxomicin usage    History of present illness:  Halie Guidry is a 83 y.o. who I am asked to evaluate and give opinion for \"fidaxomicin usage\". History is obtained from the patient (limited), her RN, and review of the old medical records which I summarize/synthesize as follows: She presented to the emergency room from her living facility yesterday afternoon with altered mental status and lethargy.  She was also having severe diarrhea.  Of note, I saw her in the hospital about 2 weeks ago for COVID-19 infection which was treated with remdesivir.  She was also treated with oral vancomycin for C. difficile.    Back to this admission, she was originally afebrile in the emergency room but later had a temperature of 100.8 F.  Labs were notable for a WBC 29, RPP negative procalcitonin 0.79, lactate 2.4, and creatinine of nearly 5.  Her UA showed too numerous to count white blood cells and 3-6 squamous cells.  She had blood cultures done and these are pending.  She was C. difficile PCR positive but antigen negative.  She was started on oral vancomycin for C. difficile results and ceftriaxone for her abnormal urinalysis.  Infectious diseases has been asked to evaluate.    Her mental status is better today.  However it is not yet back to baseline.  She cannot tell me whether or not she is having any dysuria.    Past Medical History:   Diagnosis Date    Acute metabolic encephalopathy 6/24/2022    Anxiety     Arthritis     Benign essential hypertension 08/20/2014    Bleeding disorder     Depression     Diabetes     Diabetes mellitus, type 2     Disc degeneration, lumbar     Headache, tension-type     Hyperlipidemia     Hypothyroidism     Neuropathy     Osteoporosis 09/09/2015    Peripheral neuropathy     Rotator cuff tear, left     Scoliosis     Shoulder pain     LEFT, TORN ROTATOR CUFF S/P FALL    Spinal stenosis        Past Surgical History: "   Procedure Laterality Date    APPENDECTOMY      BILATERAL BREAST REDUCTION Bilateral 08/2015    CATARACT EXTRACTION  03/2015    CHOLECYSTECTOMY WITH INTRAOPERATIVE CHOLANGIOGRAM N/A 3/27/2022    Procedure: CHOLECYSTECTOMY LAPAROSCOPIC INTRAOPERATIVE CHOLANGIOGRAM;  Surgeon: Aiyana Hill MD;  Location: [facility] OR;  Service: General;  Laterality: N/A;    COLONOSCOPY  06/05/2015    WNL    ERCP N/A 2/25/2022    Procedure: ENDOSCOPIC RETROGRADE CHOLANGIOPANCREATOGRAPHY with sphincterotomy and balloon sweep;  Surgeon: Chilo Wilhelm MD;  Location: [facility] ENDOSCOPY;  Service: Gastroenterology;  Laterality: N/A;  PRE/POST - CBD stones    ERCP N/A 3/28/2022    Procedure: ENDOSCOPIC RETROGRADE CHOLANGIOPANCREATOGRAPHY WITH SPHINCTEROTOMY AND BALLOON SWEEP;  Surgeon: Chilo Wilhelm MD;  Location: [facility] ENDOSCOPY;  Service: Gastroenterology;  Laterality: N/A;  PRE: COMMON DUCT STONE  POST: COMMON DUCT STONE    KYPHOPLASTY      REDUCTION MAMMAPLASTY      TONSILLECTOMY         Antibiotic allergies and intolerances:  Sulfa      Medications:    Current Facility-Administered Medications:     acetaminophen (TYLENOL) tablet 650 mg, 650 mg, Oral, Q4H PRN, Kim Barnard MD, 650 mg at 01/28/24 2054    atorvastatin (LIPITOR) tablet 80 mg, 80 mg, Oral, Nightly, Kim Barnard MD, 80 mg at 01/28/24 2041    bisoprolol (ZEBeta) tablet 5 mg, 5 mg, Oral, Daily, Kim Barnard MD, 5 mg at 01/28/24 1014    cadexomer iodine (IODOSORB) 0.9 % gel 1 application , 1 application , Topical, Daily PRN, Kim Barnard MD    cefTRIAXone (ROCEPHIN) 2,000 mg in sodium chloride 0.9 % 100 mL IVPB-VTB, 2,000 mg, Intravenous, Q24H, Kim Barnard MD, Last Rate: 200 mL/hr at 01/28/24 2042, 2,000 mg at 01/28/24 2042    dextrose (D50W) (25 g/50 mL) IV injection 25 g, 25 g, Intravenous, Q15 Min PRN, Kim Barnard MD    dextrose (GLUTOSE) oral gel 15 g, 15 g, Oral, Q15 Min PRN, Stingl, Kim  MD Brock    famotidine (PEPCID) tablet 20 mg, 20 mg, Oral, Daily, Nolan Case MD    glucagon (GLUCAGEN) injection 1 mg, 1 mg, Intramuscular, Q15 Min PRN, Kim Barnard MD    HYDROcodone-acetaminophen (NORCO)  MG per tablet 1 tablet, 1 tablet, Oral, Q6H PRN, Nolan Case MD, 1 tablet at 01/29/24 0521    insulin lispro (HUMALOG/ADMELOG) injection 2-7 Units, 2-7 Units, Subcutaneous, 4x Daily AC & at Bedtime, Kim Barnard MD, 3 Units at 01/28/24 2131    levothyroxine (SYNTHROID, LEVOTHROID) tablet 112 mcg, 112 mcg, Oral, Once per day on Monday Tuesday Wednesday Thursday Friday Saturday, Kim Barnard MD    levothyroxine (SYNTHROID, LEVOTHROID) tablet 168 mcg, 168 mcg, Oral, Weekly, Kim Barnard MD    melatonin tablet 3 mg, 3 mg, Oral, Nightly PRN, Kim Barnard MD    multivitamin with minerals 1 tablet, 1 tablet, Oral, Daily, Kim Barnard MD, 1 tablet at 01/28/24 1013    ondansetron ODT (ZOFRAN-ODT) disintegrating tablet 4 mg, 4 mg, Oral, Q6H PRN **OR** ondansetron (ZOFRAN) injection 4 mg, 4 mg, Intravenous, Q6H PRN, Kim Barnard MD    Pharmacy Consult, , Does not apply, Continuous PRN, Nolan Case MD    Pharmacy Consult, , Does not apply, Continuous PRN, Nolan Case MD    pregabalin (LYRICA) capsule 50 mg, 50 mg, Oral, Daily, Kim Barnard MD, 50 mg at 01/28/24 1014    sodium bicarbonate 150 mEq/1000 mL D5W infusion, 150 mEq, Intravenous, Continuous, Raffaele Stevens MD, Last Rate: 125 mL/hr at 01/29/24 0354, 150 mEq at 01/29/24 0354    vancomycin (VANCOCIN) capsule 125 mg, 125 mg, Oral, Q6H, Nolan Case MD, 125 mg at 01/29/24 0521    vitamin B-12 (CYANOCOBALAMIN) tablet 1,000 mcg, 1,000 mcg, Oral, Daily, Stingl, Kim Gutiérrez MD, 1,000 mcg at 01/28/24 1014      Objective   Vital Signs   Temp:  [98.6 °F (37 °C)-100.8 °F (38.2 °C)] 98.6 °F (37 °C)  Heart Rate:  [70-80] 70  Resp:  [16-18] 18  BP:  ()/(44-72) 111/52    Physical Exam:   General: Ill-appearing, confused  Eyes: no scleral icterus  Cardiovascular: NR  Respiratory: normal work of breathing on 1 L nasal cannula  GI: Abdomen is soft, mildly tender: Positive bowel sounds  :  no Baig catheter  MSK: Foot is bandaged; no surrounding erythema  Skin: Chronic venous stasis changes  Neurological: Alert and oriented x 3  Psychiatric: Normal mood and affect   Vasc: PIV w/o erythema    Labs:     Lab Results   Component Value Date    WBC 22.24 (H) 01/29/2024    HGB 10.8 (L) 01/29/2024    HCT 33.5 (L) 01/29/2024    MCV 85.7 01/29/2024     01/29/2024       Lab Results   Component Value Date    GLUCOSE 117 (H) 01/29/2024    BUN 61 (H) 01/29/2024    CREATININE 3.12 (H) 01/29/2024    EGFRIFNONA 51 (L) 02/28/2022    EGFRIFAFRI 50 (L) 01/18/2021    BCR 19.6 01/29/2024    CO2 15.3 (L) 01/29/2024    CALCIUM 7.5 (L) 01/29/2024    PROTENTOTREF 7.2 06/02/2023    ALBUMIN 2.4 (L) 01/29/2024    LABIL2 1.3 06/02/2023    AST 23 01/27/2024    ALT 13 01/27/2024     Lab Results   Component Value Date    HGBA1C 8.20 (H) 12/24/2023       Urinalysis with too numerous to count white blood cells, 3-6 squamous cells, large LE  Procalcitonin 0.79  Lactate 2.4      Microbiology:  1/27 RPP: Negative  1/27 blood cultures: Negative to date  1/28 C. difficile PCR positive but antigen negative    Radiology:  CT abdomen pelvis consistent with pancolitis    ASSESSMENT/PLAN:  Sepsis due to undetermined organism   recurrent C. difficile colitis  Acute kidney injury  CKD 3a  Recent COVID-19 infection-resolved  Metabolic acidosis  Confusion-possible polypharmacy  Uncontrolled type 2 diabetes    For recurrent C. difficile diarrhea, we will see if we can get Dificid 200 mg p.o. every 12 hours approved.  However, if not then there is no reason we cannot use a prolonged vancomycin taper.    Unfortunately she cannot give me a cough and history as to whether or not she has any urinary  symptoms.  Given her fever and leukocytosis, I think it is reasonable to continue her on ceftriaxone for at least 1 more day while awaiting her blood culture results.    I do not see any current evidence of a foot or skin and soft tissue infection.    Thanks to nephrology for their evaluation.  I will follow-up their evaluation again today.    ID will follow.     Addendum: Case discussed with Dr. Case.  Also discussed with pharmacy who has been extremely helpful in getting this patient into the patient assistance program for [facility].

## 2024-01-29 NOTE — DISCHARGE PLACEMENT REQUEST
"Tabatha Guidry (83 y.o. Female)       Date of Birth   1940    Social Security Number       Address   [address] [place] [address]    Home Phone   [phone]    MRN   [id number]       Oriental orthodox   Buddhism    Marital Status                               Admission Date   1/27/24    Admission Type   Emergency    Admitting Provider   Kim Barnard MD    Attending Provider   Nolan Case MD    Department, Room/Bed   [address], [address]       Discharge Date       Discharge Disposition       Discharge Destination                                 Attending Provider: Nolan Case MD    Allergies: Sulfa Antibiotics    Isolation: Enh Drop/Con, Spore   Infection: COVID (confirmed) (01/09/24), C.difficile (01/28/24)   Code Status: No CPR    Ht: 172.7 cm (68\")   Wt: 94.7 kg (208 lb 12.4 oz)    Admission Cmt: None   Principal Problem: Sepsis [A41.9]                   Active Insurance as of 1/27/2024       Primary Coverage       Payor Plan Insurance Group Employer/Plan Group    [facility] MEDICARE REPLACEMENT [facility] MED ADV GROUP [id number]       Payor Plan Address Payor Plan Phone Number Payor Plan Fax Number Effective Dates    [address] [address]   1/1/2023 - None Entered    [facility] [id number]         Subscriber Name Subscriber Birth Date Member ID       TABATHA GUIDRY 1940 [id number]                     Emergency Contacts        (Rel.) Home Phone Work Phone Mobile Phone    Derrick Guidry (Son) [phone] -- [phone]    BreaTamikaJosse (Son) [phone] -- [phone]    MONTANAJAYME EDWARDS (Grandchild) -- -- [phone]                "

## 2024-01-29 NOTE — PROGRESS NOTES
[facility]    [phone]   LOS: 2 days     Name: Halie Guidry  Age/Sex: 83 y.o. female  :  1940        PCP: Luis Felipe Flowers MD  Chief Complaint   Patient presents with    Altered Mental Status      Subjective   She is more awake today, febrile again last evening.  Still with liquid bowel movements.  FMS is functioning correctly.  She is dealing with urinary retention and requiring straight catheterization at times.    atorvastatin, 80 mg, Oral, Nightly  bisoprolol, 5 mg, Oral, Daily  castor oil-balsam peru, 1 Application, Topical, Q12H  cefTRIAXone, 2,000 mg, Intravenous, Q24H  famotidine, 20 mg, Oral, Daily  insulin lispro, 2-7 Units, Subcutaneous, 4x Daily AC & at Bedtime  levothyroxine, 112 mcg, Oral, Once per day on   levothyroxine, 168 mcg, Oral, Weekly  Menthol-Zinc Oxide, 1 Application, Topical, 4x Daily  multivitamin with minerals, 1 tablet, Oral, Daily  pregabalin, 50 mg, Oral, Daily  vancomycin, 125 mg, Oral, Q6H  [START ON 2024] vancomycin, 125 mg, Oral, Q12H  [START ON 2/15/2024] vancomycin, 125 mg, Oral, Q24H  [START ON 2024] vancomycin, 125 mg, Oral, Every Other Day  cyanocobalamin, 1,000 mcg, Oral, Daily      Pharmacy Consult,   Pharmacy Consult,   sodium bicarbonate, 150 mEq, Last Rate: 150 mEq (24 0940)        Objective   Vital Signs  Temp:  [97.7 °F (36.5 °C)-100.8 °F (38.2 °C)] 97.7 °F (36.5 °C)  Heart Rate:  [70-80] 80  Resp:  [16-18] 18  BP: ()/(44-72) 129/53  Body mass index is 31.74 kg/m².    Intake/Output Summary (Last 24 hours) at 2024 1416  Last data filed at 2024 1247  Gross per 24 hour   Intake 120 ml   Output 900 ml   Net -780 ml       Physical Exam  Vitals and nursing note reviewed.   Constitutional:       General: She is not in acute distress.     Appearance: She is obese. She is ill-appearing.      Comments: Lethargic but arousable   Cardiovascular:      Rate and Rhythm:  Normal rate and regular rhythm.   Pulmonary:      Effort: No respiratory distress.      Breath sounds: Normal breath sounds.   Abdominal:      General: Bowel sounds are normal.      Palpations: Abdomen is soft.      Tenderness: There is abdominal tenderness.   Neurological:      Mental Status: She is oriented to person, place, and time.      Comments: Awake alert following commands no focal neurologic deficits       Results Review:       I reviewed the patient's new clinical results.  Results from last 7 days   Lab Units 01/29/24  0504 01/28/24  0928 01/27/24  1615   WBC 10*3/mm3 22.24* 20.38* 29.84*   HEMOGLOBIN g/dL 10.8* 10.4* 12.5   PLATELETS 10*3/mm3 338 304 417     Results from last 7 days   Lab Units 01/29/24  0504 01/28/24  0928 01/27/24  1615   SODIUM mmol/L 139 139 137   POTASSIUM mmol/L 3.1* 4.0 4.5   CHLORIDE mmol/L 107 110* 104   CO2 mmol/L 15.3* 13.3* 14.0*   BUN mg/dL 61* 67* 68*   CREATININE mg/dL 3.12* 4.05* 4.99*   CALCIUM mg/dL 7.5* 7.6* 9.0   MAGNESIUM mg/dL 2.0  --  2.1   PHOSPHORUS mg/dL 4.8*  --   --    Estimated Creatinine Clearance: 16.4 mL/min (A) (by C-G formula based on SCr of 3.12 mg/dL (H)).  Lab Results   Component Value Date    HGBA1C 8.20 (H) 12/24/2023    HGBA1C 11.80 (H) 06/02/2023    HGBA1C 7.70 (H) 09/01/2022     Glucose   Date/Time Value Ref Range Status   01/29/2024 1212 142 (H) 70 - 130 mg/dL Final   01/29/2024 0832 163 (H) 70 - 130 mg/dL Final   01/28/2024 2032 222 (H) 70 - 130 mg/dL Final   01/28/2024 1724 199 (H) 70 - 130 mg/dL Final   01/28/2024 1241 161 (H) 70 - 130 mg/dL Final   01/28/2024 0815 162 (H) 70 - 130 mg/dL Final   01/27/2024 2149 176 (H) 70 - 130 mg/dL Final   01/27/2024 1551 176 (H) 70 - 130 mg/dL Final     Assessment & Plan   Active Hospital Problems    Diagnosis  POA    **Sepsis [A41.9]  Yes    Stage 3a chronic kidney disease [N18.31]  Yes    Metabolic encephalopathy [G93.41]  Yes    Pressure injury of buttock, stage 2 [L89.302]  Yes    C. difficile  diarrhea [A04.72]  Yes    Neuropathic pain [M79.2]  Yes    Acute UTI (urinary tract infection) [N39.0]  Yes    VIPUL (acute kidney injury) [N17.9]  Yes    Seizure [R56.9]  Yes    Hyperlipidemia [E78.5]  Yes    Type 2 diabetes mellitus with hyperglycemia, with long-term current use of insulin [E11.65, Z79.4]  Not Applicable    Benign essential hypertension [I10]  Yes      Resolved Hospital Problems   No resolved problems to display.       PLAN  This is an 83-year-old lady with a history of chronic kidney disease stage IIIa, hypertension, hyperlipidemia, type 2 diabetes and a fairly complicated recent past medical history with diabetic foot wound infected with multidrug-resistant Enterobacter and subsequent acute renal failure and C. difficile colitis recurrence.  She presents on this hospitalization with diarrhea and altered mental status and is found to have severe sepsis with a white blood cell count of 29 acute renal failure and metabolic encephalopathy likely related to recurrent C. difficile colitis.  -White blood cell count remains elevated CT scan independently reviewed and shows pancolitis consistent with C. difficile infection  -Her C. difficile testing was positive but her toxin screen was negative.  I still think this is an active infection.  Discussed with infectious disease today and they agree.  -Her renal function slowly improving will give potassium today to correct hypokalemia  -Remains on bicarb   -Appreciate nephrology and infectious disease input.  -Mechanical DVT prophylaxis  -DNR    Disposition  Expected Discharge Date: 2/5/2024; Expected Discharge Time:        oNlan Case MD  [facility]  01/29/24  14:16 EST

## 2024-01-29 NOTE — NURSING NOTE
01/29/24 1108   Wound 01/27/24 Bilateral medial sacral spine Pressure Injury   Placement Date: 01/27/24   Present on Original Admission: Yes  Side: Bilateral  Orientation: medial  Location: sacral spine  Primary Wound Type: Pressure Injury   Base moist;pink;red  (combination of IAD and pressure with multiple areas of pink partial thickness skin loss POA.)   Periwound intact;dry   Periwound Temperature warm   Periwound Skin Turgor soft   Drainage Amount none   Care, Wound cleansed with;barrier applied  (green wipes)   Wound Left anterior third toe Diabetic Ulcer   No placement date or time found.   Present on Original Admission: Yes  Side: Left  Orientation: anterior  Location: third toe  Primary Wound Type: Diabetic Ulcer   Base scab  (stable scab/ eschar)   Periwound intact;redness;swelling   Periwound Temperature warm   Drainage Amount none   Dressing Care open to air  (staff can paint with betadine and leave open to air)   Wound Left posterior heel Pressure Injury   No placement date or time found.   Present on Original Admission: Yes  Side: Left  Orientation: posterior  Location: heel  Primary Wound Type: Pressure Injury   Base scab  (resloving blister, dry)   Periwound intact;dry   Drainage Amount none   Dressing Care open to air  (venelex and heel boots added)   Wound 01/09/24 2332 Right anterior third toe   Placement Date/Time: 01/09/24 2332   Side: Right  Orientation: anterior  Location: third toe  Additional Comments: left anterior third toe, incorrectly documented   Base scab  (stable eschar/ scab, diabetic wound)   Periwound intact;redness;swelling   Drainage Amount none   Dressing Care open to air  (staff can paint with betadine and leave open to air)   Wound 12/24/23 1938 Left posterior plantar Diabetic Ulcer   Placement Date/Time: 12/24/23 1938   Present on Original Admission: Yes  Side: Left  Orientation: posterior  Location: plantar  Primary Wound Type: Diabetic Ulcer   Base black eschar    Periwound intact;redness;swelling   Periwound Temperature warm   Drainage Amount none   Dressing Care open to air  (staff can paint with betadine and leave open to air)   Skin Interventions   Pressure Reduction Devices pressure-redistributing mattress utilized;alternating pressure pump (ADD);heel offloading device utilized  (BRETT mattress has been ordered)   Pressure Reduction Techniques heels elevated off bed;positioned off wounds;pressure points protected     CWON note: pt seen for evaluation of multiple skin issues POA. She is alert and oriented, able to turn and reposition with assistance, she is incontinent of bowel and bladder, with frequent loose stools, C diff +. A BRETT Mattress from [employer] has been ordered for adequate pressure redistribution and control of microclimate. I have ordered some Calmoseptine ointment with ABD pad over her multiple sacral wounds, as she is having frequent BM's and dressings will be ineffective at this time. She has intertrigo in her groin folds and under her  breasts. Barrier cream can be used in folds with pillow cases placed to wick moisture and prevent skin on skin friction. All diabetic foot wounds are stable and can be painted with betadine and left open to air. Catarino LE erythema and edema noted, heel boots are in placed. Right heel with slowly blanchable erythema, left heel with resolved blister noted. Wound care and prevention standing orders placed into EPIC. Discussed with RN. Please re-consult for any additional needs.

## 2024-01-29 NOTE — TELEPHONE ENCOUNTER
I rec'd a message to call Lashae mohamud/ [email or web address] Med Assistance program at [phone] regarding patient's application. I called back and gave reference # [id number] that had been left along w/ the message Lashae left. The rep [employer] said the application has been approved up to Dec 31, 2024 and that he could send the medication out to patient's home address if I could confirm her address.  I reviewed Dr. Rodriguez's hosp notes since I am not familiar w/ this patient and it looked like he talked w/ a hospital pharmacist about the [email or web address] Assistance program to try to get Dificid 200 mg p.o. every 12 hours approved. The [facility] rep [employer] said that Ziggy mohamud/ pharmacy had filled out the form.  Since this patient has not been to Dr. Rodriguez's office and was currently a patient at [facility] [place], I sent email to [facility] [place] pharmacist Ziggy Cortez & [facility] [place] hosp  Kenya Riley to please follow up w/ someone at the [email or web address] Assistance program so that they could ship this medicine to the correct address since it appears [facility] and the address listed on the hosp demographic sheet is the rehab's address.

## 2024-01-30 LAB
ALBUMIN SERPL-MCNC: 2.1 G/DL (ref 3.5–5.2)
ALBUMIN/GLOB SERPL: 0.8 G/DL
ALP SERPL-CCNC: 115 U/L (ref 39–117)
ALT SERPL W P-5'-P-CCNC: 11 U/L (ref 1–33)
ANION GAP SERPL CALCULATED.3IONS-SCNC: 12 MMOL/L (ref 5–15)
AST SERPL-CCNC: 18 U/L (ref 1–32)
BILIRUB SERPL-MCNC: <0.2 MG/DL (ref 0–1.2)
BUN SERPL-MCNC: 48 MG/DL (ref 8–23)
BUN/CREAT SERPL: 21.8 (ref 7–25)
CALCIUM SPEC-SCNC: 7.5 MG/DL (ref 8.6–10.5)
CHLORIDE SERPL-SCNC: 110 MMOL/L (ref 98–107)
CO2 SERPL-SCNC: 21 MMOL/L (ref 22–29)
CREAT SERPL-MCNC: 2.2 MG/DL (ref 0.57–1)
DEPRECATED RDW RBC AUTO: 47.2 FL (ref 37–54)
EGFRCR SERPLBLD CKD-EPI 2021: 21.7 ML/MIN/1.73
ERYTHROCYTE [DISTWIDTH] IN BLOOD BY AUTOMATED COUNT: 15.9 % (ref 12.3–15.4)
GLOBULIN UR ELPH-MCNC: 2.7 GM/DL
GLUCOSE BLDC GLUCOMTR-MCNC: 180 MG/DL (ref 70–130)
GLUCOSE BLDC GLUCOMTR-MCNC: 210 MG/DL (ref 70–130)
GLUCOSE BLDC GLUCOMTR-MCNC: 294 MG/DL (ref 70–130)
GLUCOSE BLDC GLUCOMTR-MCNC: 304 MG/DL (ref 70–130)
GLUCOSE BLDC GLUCOMTR-MCNC: 97 MG/DL (ref 70–130)
GLUCOSE SERPL-MCNC: 85 MG/DL (ref 65–99)
HCT VFR BLD AUTO: 31.2 % (ref 34–46.6)
HGB BLD-MCNC: 10.2 G/DL (ref 12–15.9)
MAGNESIUM SERPL-MCNC: 1.7 MG/DL (ref 1.6–2.4)
MCH RBC QN AUTO: 26.8 PG (ref 26.6–33)
MCHC RBC AUTO-ENTMCNC: 32.7 G/DL (ref 31.5–35.7)
MCV RBC AUTO: 82.1 FL (ref 79–97)
PLATELET # BLD AUTO: 311 10*3/MM3 (ref 140–450)
PMV BLD AUTO: 9.8 FL (ref 6–12)
POTASSIUM SERPL-SCNC: 2.9 MMOL/L (ref 3.5–5.2)
POTASSIUM SERPL-SCNC: 3.4 MMOL/L (ref 3.5–5.2)
PROT SERPL-MCNC: 4.8 G/DL (ref 6–8.5)
RBC # BLD AUTO: 3.8 10*6/MM3 (ref 3.77–5.28)
SODIUM SERPL-SCNC: 143 MMOL/L (ref 136–145)
WBC NRBC COR # BLD AUTO: 23.88 10*3/MM3 (ref 3.4–10.8)

## 2024-01-30 PROCEDURE — 99497 ADVNCD CARE PLAN 30 MIN: CPT | Performed by: NURSE PRACTITIONER

## 2024-01-30 PROCEDURE — 80053 COMPREHEN METABOLIC PANEL: CPT | Performed by: INTERNAL MEDICINE

## 2024-01-30 PROCEDURE — 99221 1ST HOSP IP/OBS SF/LOW 40: CPT | Performed by: NURSE PRACTITIONER

## 2024-01-30 PROCEDURE — 85027 COMPLETE CBC AUTOMATED: CPT | Performed by: INTERNAL MEDICINE

## 2024-01-30 PROCEDURE — 84132 ASSAY OF SERUM POTASSIUM: CPT | Performed by: INTERNAL MEDICINE

## 2024-01-30 PROCEDURE — 63710000001 INSULIN LISPRO (HUMAN) PER 5 UNITS: Performed by: INTERNAL MEDICINE

## 2024-01-30 PROCEDURE — 97110 THERAPEUTIC EXERCISES: CPT

## 2024-01-30 PROCEDURE — 83735 ASSAY OF MAGNESIUM: CPT | Performed by: INTERNAL MEDICINE

## 2024-01-30 PROCEDURE — 82948 REAGENT STRIP/BLOOD GLUCOSE: CPT

## 2024-01-30 PROCEDURE — 97530 THERAPEUTIC ACTIVITIES: CPT

## 2024-01-30 PROCEDURE — 25010000002 MAGNESIUM SULFATE 2 GM/50ML SOLUTION: Performed by: INTERNAL MEDICINE

## 2024-01-30 PROCEDURE — 99232 SBSQ HOSP IP/OBS MODERATE 35: CPT | Performed by: INTERNAL MEDICINE

## 2024-01-30 PROCEDURE — 25810000003 LACTATED RINGERS PER 1000 ML: Performed by: INTERNAL MEDICINE

## 2024-01-30 RX ORDER — MAGNESIUM SULFATE HEPTAHYDRATE 40 MG/ML
2 INJECTION, SOLUTION INTRAVENOUS ONCE
Status: COMPLETED | OUTPATIENT
Start: 2024-01-30 | End: 2024-01-30

## 2024-01-30 RX ORDER — POTASSIUM CHLORIDE 750 MG/1
40 TABLET, FILM COATED, EXTENDED RELEASE ORAL ONCE
Status: COMPLETED | OUTPATIENT
Start: 2024-01-30 | End: 2024-01-30

## 2024-01-30 RX ORDER — POTASSIUM CHLORIDE 1.5 G/1.58G
40 POWDER, FOR SOLUTION ORAL ONCE
Status: COMPLETED | OUTPATIENT
Start: 2024-01-30 | End: 2024-01-30

## 2024-01-30 RX ADMIN — ANORECTAL OINTMENT 1 APPLICATION: 15.7; .44; 24; 20.6 OINTMENT TOPICAL at 20:12

## 2024-01-30 RX ADMIN — Medication 1000 MCG: at 09:20

## 2024-01-30 RX ADMIN — HYDROCODONE BITARTRATE AND ACETAMINOPHEN 1 TABLET: 10; 325 TABLET ORAL at 20:17

## 2024-01-30 RX ADMIN — BISOPROLOL FUMARATE 5 MG: 5 TABLET, FILM COATED ORAL at 15:46

## 2024-01-30 RX ADMIN — MAGNESIUM SULFATE HEPTAHYDRATE 2 G: 40 INJECTION, SOLUTION INTRAVENOUS at 09:19

## 2024-01-30 RX ADMIN — VANCOMYCIN HYDROCHLORIDE 125 MG: 125 CAPSULE ORAL at 17:33

## 2024-01-30 RX ADMIN — VANCOMYCIN HYDROCHLORIDE 125 MG: 125 CAPSULE ORAL at 05:41

## 2024-01-30 RX ADMIN — ATORVASTATIN CALCIUM 80 MG: 80 TABLET, FILM COATED ORAL at 20:11

## 2024-01-30 RX ADMIN — INSULIN LISPRO 2 UNITS: 100 INJECTION, SOLUTION INTRAVENOUS; SUBCUTANEOUS at 13:08

## 2024-01-30 RX ADMIN — POTASSIUM CHLORIDE 40 MEQ: 1.5 POWDER, FOR SOLUTION ORAL at 06:58

## 2024-01-30 RX ADMIN — ANORECTAL OINTMENT 1 APPLICATION: 15.7; .44; 24; 20.6 OINTMENT TOPICAL at 17:33

## 2024-01-30 RX ADMIN — VANCOMYCIN HYDROCHLORIDE 125 MG: 125 CAPSULE ORAL at 00:45

## 2024-01-30 RX ADMIN — VANCOMYCIN HYDROCHLORIDE 125 MG: 125 CAPSULE ORAL at 11:34

## 2024-01-30 RX ADMIN — CASTOR OIL AND BALSAM, PERU 1 APPLICATION: 788; 87 OINTMENT TOPICAL at 09:20

## 2024-01-30 RX ADMIN — CASTOR OIL AND BALSAM, PERU 1 APPLICATION: 788; 87 OINTMENT TOPICAL at 20:11

## 2024-01-30 RX ADMIN — SODIUM CHLORIDE, POTASSIUM CHLORIDE, SODIUM LACTATE AND CALCIUM CHLORIDE 100 ML/HR: 600; 310; 30; 20 INJECTION, SOLUTION INTRAVENOUS at 20:12

## 2024-01-30 RX ADMIN — Medication 1 TABLET: at 09:20

## 2024-01-30 RX ADMIN — POTASSIUM CHLORIDE 40 MEQ: 750 TABLET, EXTENDED RELEASE ORAL at 11:33

## 2024-01-30 RX ADMIN — PREGABALIN 50 MG: 50 CAPSULE ORAL at 09:20

## 2024-01-30 RX ADMIN — ANORECTAL OINTMENT 1 APPLICATION: 15.7; .44; 24; 20.6 OINTMENT TOPICAL at 09:20

## 2024-01-30 RX ADMIN — VANCOMYCIN HYDROCHLORIDE 125 MG: 125 CAPSULE ORAL at 23:16

## 2024-01-30 RX ADMIN — LEVOTHYROXINE SODIUM 112 MCG: 112 TABLET ORAL at 06:57

## 2024-01-30 RX ADMIN — FAMOTIDINE 20 MG: 20 TABLET, FILM COATED ORAL at 09:20

## 2024-01-30 RX ADMIN — INSULIN LISPRO 3 UNITS: 100 INJECTION, SOLUTION INTRAVENOUS; SUBCUTANEOUS at 20:54

## 2024-01-30 RX ADMIN — HYDROCODONE BITARTRATE AND ACETAMINOPHEN 1 TABLET: 10; 325 TABLET ORAL at 03:27

## 2024-01-30 RX ADMIN — ANORECTAL OINTMENT 1 APPLICATION: 15.7; .44; 24; 20.6 OINTMENT TOPICAL at 11:34

## 2024-01-30 RX ADMIN — INSULIN LISPRO 4 UNITS: 100 INJECTION, SOLUTION INTRAVENOUS; SUBCUTANEOUS at 17:33

## 2024-01-30 RX ADMIN — SODIUM CHLORIDE, POTASSIUM CHLORIDE, SODIUM LACTATE AND CALCIUM CHLORIDE 100 ML/HR: 600; 310; 30; 20 INJECTION, SOLUTION INTRAVENOUS at 09:19

## 2024-01-30 NOTE — THERAPY TREATMENT NOTE
Patient Name: Halie Guidry  : 1940    MRN: [id number]                              Today's Date: 2024       Admit Date: 2024    Visit Dx:     ICD-10-CM ICD-9-CM   1. Sepsis with acute renal failure, due to unspecified organism, unspecified acute renal failure type, unspecified whether septic shock present  A41.9 038.9    R65.20 995.92    N17.9 584.9   2. Encephalopathy  G93.40 348.30     Patient Active Problem List   Diagnosis    Compression fracture    Lumbar degenerative disc disease    Type 2 diabetes mellitus with hyperglycemia, with long-term current use of insulin    Hyperlipidemia    Benign essential hypertension    Primary hypothyroidism    Neuropathy    Osteoporosis    Proteinuria    Tobacco abuse    Diabetic eye exam    Generalized weakness    Spinal stenosis    Scoliosis    Arthritis    VBI (vertebrobasilar insufficiency)    Orthostatic hypotension    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    Severe hypothyroidism    Noncompliance with medication regimen    Vitamin D deficiency disease    Altered mental status    Tremor    Seizure    Stage 3a chronic kidney disease    Thoracic degenerative disc disease    Metabolic encephalopathy    VIPUL (acute kidney injury)    Hyperglycemia    Type II diabetes mellitus, uncontrolled    Lower abdominal pain    Transaminitis    UTI (urinary tract infection)    Emphysematous cystitis    Choledocholithiasis    Hypokalemia    Hypoxia    Generalized abdominal pain    History of Clostridium difficile infection    History of ERCP    Acute UTI (urinary tract infection)    Hypomagnesemia    Burning pain    Anxiety disorder    Neuropathic pain    Intertrigo    Weakness of both lower extremities    Accelerated hypertension    Acute cystitis without hematuria    Altered mental status, unspecified altered mental status type    Left lower lobe pneumonia    Acute pain of left foot    Cellulitis and abscess of left lower extremity    Hypertensive  kidney disease with stage 3a chronic kidney disease    Bilateral leg pain    Peripheral edema    Primary malignant neuroendocrine tumor of pancreas    Encounter for long-term (current) use of high-risk medication    Diabetic foot ulcer    Acute renal insufficiency    C. difficile diarrhea    Sepsis    Stage 3a chronic kidney disease    Metabolic encephalopathy    Pressure injury of buttock, stage 2     Past Medical History:   Diagnosis Date    Acute metabolic encephalopathy 6/24/2022    Anxiety     Arthritis     Benign essential hypertension 08/20/2014    Bleeding disorder     Depression     Diabetes     Diabetes mellitus, type 2     Disc degeneration, lumbar     Headache, tension-type     Hyperlipidemia     Hypothyroidism     Neuropathy     Osteoporosis 09/09/2015    Peripheral neuropathy     Rotator cuff tear, left     Scoliosis     Shoulder pain     LEFT, TORN ROTATOR CUFF S/P FALL    Spinal stenosis      Past Surgical History:   Procedure Laterality Date    APPENDECTOMY      BILATERAL BREAST REDUCTION Bilateral 08/2015    CATARACT EXTRACTION  03/2015    CHOLECYSTECTOMY WITH INTRAOPERATIVE CHOLANGIOGRAM N/A 3/27/2022    Procedure: CHOLECYSTECTOMY LAPAROSCOPIC INTRAOPERATIVE CHOLANGIOGRAM;  Surgeon: Aiyana Hill MD;  Location: [facility];  Service: General;  Laterality: N/A;    COLONOSCOPY  06/05/2015    WNL    ERCP N/A 2/25/2022    Procedure: ENDOSCOPIC RETROGRADE CHOLANGIOPANCREATOGRAPHY with sphincterotomy and balloon sweep;  Surgeon: Chilo Wilhelm MD;  Location: [facility] ENDOSCOPY;  Service: Gastroenterology;  Laterality: N/A;  PRE/POST - CBD stones    ERCP N/A 3/28/2022    Procedure: ENDOSCOPIC RETROGRADE CHOLANGIOPANCREATOGRAPHY WITH SPHINCTEROTOMY AND BALLOON SWEEP;  Surgeon: Chilo Wilhelm MD;  Location: [facility] ENDOSCOPY;  Service: Gastroenterology;  Laterality: N/A;  PRE: COMMON DUCT STONE  POST: COMMON DUCT STONE    KYPHOPLASTY      REDUCTION MAMMAPLASTY      TONSILLECTOMY         General Information       Row Name 01/30/24 0957          Physical Therapy Time and Intention    Document Type therapy note (daily note)  -CS     Mode of Treatment individual therapy;physical therapy  -CS       Row Name 01/30/24 0957          General Information    Patient Profile Reviewed yes  -CS     Existing Precautions/Restrictions fall  -CS       Row Name 01/30/24 0957          Cognition    Orientation Status (Cognition) oriented to;person;place  -CS       Row Name 01/30/24 0957          Safety Issues, Functional Mobility    Safety Issues Affecting Function (Mobility) insight into deficits/self-awareness;judgment;sequencing abilities;problem-solving  -CS     Impairments Affecting Function (Mobility) balance;endurance/activity tolerance;strength;cognition  -CS               User Key  (r) = Recorded By, (t) = Taken By, (c) = Cosigned By      Initials Name Provider Type    CS Lizzy Waterman PT Physical Therapist                   Mobility       Row Name 01/30/24 0957          Bed Mobility    Bed Mobility scooting/bridging;supine-sit;sit-supine;rolling left;rolling right  -CS     Rolling Left [place] (Bed Mobility) minimum assist (75% patient effort);verbal cues;nonverbal cues (demo/gesture)  -CS     Rolling Right [place] (Bed Mobility) minimum assist (75% patient effort);verbal cues;nonverbal cues (demo/gesture)  -CS     Scooting/Bridging [place] (Bed Mobility) dependent (less than 25% patient effort);2 person assist;verbal cues;nonverbal cues (demo/gesture)  -CS     Supine-Sit [place] (Bed Mobility) maximum assist (25% patient effort);verbal cues;nonverbal cues (demo/gesture)  -CS     Sit-Supine [place] (Bed Mobility) maximum assist (25% patient effort);verbal cues;nonverbal cues (demo/gesture)  -CS     Assistive Device (Bed Mobility) bed rails;head of bed elevated  -CS     Comment, (Bed Mobility) increased time to complete + cues provided for sequencing; rolling performed to clean up  from BM  -CS       Row Name 01/30/24 0957          Bed-Chair Transfer    Bed-Chair Coats (Transfers) unable to assess  -CS       Row Name 01/30/24 0957          Sit-Stand Transfer    Sit-Stand Coats (Transfers) dependent (less than 25% patient effort);verbal cues;nonverbal cues (demo/gesture)  -CS     Assistive Device (Sit-Stand Transfers) walker, front-wheeled  -CS     Comment, (Sit-Stand Transfer) attempted 3x from EOB - pt unable to clear bottom despite max cues for sequencing  -CS       Row Name 01/30/24 0957          Gait/Stairs (Locomotion)    Coats Level (Gait) unable to assess  -CS               User Key  (r) = Recorded By, (t) = Taken By, (c) = Cosigned By      Initials Name Provider Type    CS Lizzy Waterman, PT Physical Therapist                   Obj/Interventions       Row Name 01/30/24 0959          Motor Skills    Therapeutic Exercise other (see comments)  10 reps B LE AP, LAQ, & MIP  -CS       Row Name 01/30/24 0959          Balance    Balance Assessment sitting static balance;sitting dynamic balance  -CS     Static Sitting Balance standby assist  -CS     Dynamic Sitting Balance standby assist;contact guard  -CS     Position, Sitting Balance supported;sitting edge of bed  -CS               User Key  (r) = Recorded By, (t) = Taken By, (c) = Cosigned By      Initials Name Provider Type    CS Lizzy Waterman, PT Physical Therapist                   Goals/Plan    No documentation.                  Clinical Impression       Row Name 01/30/24 0959          Pain    Pretreatment Pain Rating 0/10 - no pain  -CS       Row Name 01/30/24 0959          Plan of Care Review    Plan of Care Reviewed With patient  -CS     Progress improving  -CS     Outcome Evaluation Pt received in bed upon arrival and agreeable to PT. Pt required max A for supine to/from sit. Pt able to sit EOB for roughly 8 minutes with good sitting balance and perform B LE strengthening exercises. Pt attempted to stand 3x  from EOB requiring dep A - pt unable to clear bottom and fatigues quickly. Pt returned to bed and required dep A x 2 to scoot up in bed. PT will continue to follow to address functional deficits.  -CS       Row Name 01/30/24 0959          Therapy Assessment/Plan (PT)    Criteria for Skilled Interventions Met (PT) yes;meets criteria  -CS     Therapy Frequency (PT) 3 times/wk  -CS       Row Name 01/30/24 0959          Vital Signs    Pre SpO2 (%) 94  -CS     O2 Delivery Pre Treatment room air  -CS     O2 Delivery Intra Treatment room air  -CS     Post SpO2 (%) 93  -CS     O2 Delivery Post Treatment room air  -CS       Row Name 01/30/24 0959          Positioning and Restraints    Pre-Treatment Position in bed  -CS     Post Treatment Position bed  -CS     In Bed supine;call light within reach;encouraged to call for assist;exit alarm on;waffle boots/both  -CS               User Key  (r) = Recorded By, (t) = Taken By, (c) = Cosigned By      Initials Name Provider Type    Lizzy Amezcua, PT Physical Therapist                   Outcome Measures       Row Name 01/30/24 1002          How much help from another person do you currently need...    Turning from your back to your side while in flat bed without using bedrails? 2  -CS     Moving from lying on back to sitting on the side of a flat bed without bedrails? 2  -CS     Moving to and from a bed to a chair (including a wheelchair)? 1  -CS     Standing up from a chair using your arms (e.g., wheelchair, bedside chair)? 1  -CS     Climbing 3-5 steps with a railing? 1  -CS     To walk in hospital room? 1  -CS     AM-PAC 6 Clicks Score (PT) 8  -CS     Highest Level of Mobility Goal 3 --> Sit at edge of bed  -CS       Row Name 01/30/24 1002          Functional Assessment    Outcome Measure Options AM-PAC 6 Clicks Basic Mobility (PT)  -CS               User Key  (r) = Recorded By, (t) = Taken By, (c) = Cosigned By      Initials Name Provider Type    Lizzy Amezcua, PT  Physical Therapist                                 Physical Therapy Education       Title: PT OT SLP Therapies (Done)       Topic: Physical Therapy (Done)       Point: Mobility training (Done)       Learning Progress Summary             Patient Acceptance, E,TB, VU,DU,NR by  at 1/30/2024 1002    Acceptance, E,TB, VU,NR by 1 at 1/28/2024 0848                         Point: Home exercise program (Done)       Learning Progress Summary             Patient Acceptance, E,TB, VU,DU,NR by  at 1/30/2024 1002    Acceptance, E,TB, VU,NR by [facility] at 1/28/2024 0848                         Point: Body mechanics (Done)       Learning Progress Summary             Patient Acceptance, E,TB, VU,DU,NR by  at 1/30/2024 1002    Acceptance, E,TB, VU,NR by [facility] at 1/28/2024 0848                         Point: Precautions (Done)       Learning Progress Summary             Patient Acceptance, E,TB, VU,DU,NR by  at 1/30/2024 1002    Acceptance, E,TB, VU,NR by [facility] at 1/28/2024 0848                                         User Key       Initials Effective Dates Name Provider Type Discipline    [facility] 07/11/23 -  Bakari Lerma, PT Physical Therapist PT     09/22/22 -  Lizzy Waterman PT Physical Therapist PT                  PT Recommendation and Plan     Plan of Care Reviewed With: patient  Progress: improving  Outcome Evaluation: Pt received in bed upon arrival and agreeable to PT. Pt required max A for supine to/from sit. Pt able to sit EOB for roughly 8 minutes with good sitting balance and perform B LE strengthening exercises. Pt attempted to stand 3x from EOB requiring dep A - pt unable to clear bottom and fatigues quickly. Pt returned to bed and required dep A x 2 to scoot up in bed. PT will continue to follow to address functional deficits.     Time Calculation:         PT Charges       Row Name 01/30/24 1002             Time Calculation    Start Time 0800  -      Stop Time 0830  -      Time Calculation (min) 30 min  -CS       PT Received On 01/30/24  -CS      PT - Next Appointment 02/01/24  -CS         Time Calculation- PT    Total Timed Code Minutes- PT 25 minute(s)  -CS         Timed Charges    93701 - PT Therapeutic Exercise Minutes 10  -CS      11868 - PT Therapeutic Activity Minutes 15  -CS         Total Minutes    Timed Charges Total Minutes 25  -CS       Total Minutes 25  -CS                User Key  (r) = Recorded By, (t) = Taken By, (c) = Cosigned By      Initials Name Provider Type    CS Lizzy Waterman, PT Physical Therapist                  Therapy Charges for Today       Code Description Service Date Service Provider Modifiers Qty    03501229701 HC PT THER PROC EA 15 MIN 1/30/2024 Lizzy Waterman, PT GP 1    70963438902 HC PT THERAPEUTIC ACT EA 15 MIN 1/30/2024 Lizzy Waterman, PT GP 1            PT G-Codes  Outcome Measure Options: AM-PAC 6 Clicks Basic Mobility (PT)  AM-PAC 6 Clicks Score (PT): 8  AM-PAC 6 Clicks Score (OT): 11  PT Discharge Summary  Anticipated Discharge Disposition (PT): skilled nursing facility    Lizzy Waterman PT  1/30/2024

## 2024-01-30 NOTE — PLAN OF CARE
Goal Outcome Evaluation:  Plan of Care Reviewed With: patient        Progress: improving  Outcome Evaluation: Pt received in bed upon arrival and agreeable to PT. Pt required max A for supine to/from sit. Pt able to sit EOB for roughly 8 minutes with good sitting balance and perform B LE strengthening exercises. Pt attempted to stand 3x from EOB requiring dep A - pt unable to clear bottom and fatigues quickly. Pt returned to bed and required dep A x 2 to scoot up in bed. PT will continue to follow to address functional deficits.      Anticipated Discharge Disposition (PT): skilled nursing facility

## 2024-01-30 NOTE — CONSULTS
.            [facility]    Palliative Care Initial Consult   Attending Physician: Nolan Case MD  Referring Provider: Dr Case    Reason for Referral: assistance with clarification of goals of care  Family/Support: ashley- Derrick and Josse    Code Status and Medical Interventions:   Ordered at: 01/28/24 1607     Medical Intervention Limits:    NO intubation (DNI)    NO dialysis     Code Status (Patient has no pulse and is not breathing):    No CPR (Do Not Attempt to Resuscitate)     Medical Interventions (Patient has pulse or is breathing):    Limited Support     Goals of Care: To be treated for acute conditions, go to rehab and then return to home setting.     HPI:   83 y.o. female with history of chronic kidney disease stage IIIa, hypertension, hyperlipidemia, type 2 diabetes,  diabetic foot wound infected with multidrug-resistant Enterobacter, and C. difficile colitis recurrence.     She resided at [facility] prior to admission.   Patient presented to [facility] on 1/27/2024 related to altered mental status, lethargy, and severe diarrhea. Workup in ER, concerning for UTI and  C. difficile PCR positive but antigen negative . She was started on IV Ceftriaxone and oral vancomycin. Also IV fluids started. Consults were placed for nephrology and infectious disease. She had further imaging with CT abd/pelvis that revealed pancolitis. Her blood cultures have remain negative to date. She has had some improvement in symptoms and infectious disease is considering transitioning patient to Dificid 200 mg every 12 hours if it can get approved. Of note, she has multiple wounds and has been assessed by infectious disease and wound RN. Due to her recurrent hospitalizations and chronic conditions, the palliative care team was consulted for support with goals of care conversation.   Palliative Care Spoke With: patient  Quality of life:  fair  Functional Status: This visit she needed maxA with bed mobility and needed maxA with sitting balance. She was able to do some leg exercises sitting edge of bed, limited by weakness per PT notes on 1/28/2024  Due to the Palliative Care Topics Discussed: palliative care, goals of care, care options, resuscitation status, [facility], and discharge options we will establish an advance care plan.   Advance Care Planning   Advance Care Planning Discussion: see below          Review of Systems   Constitutional: Positive for decreased appetite and malaise/fatigue.   Cardiovascular:  Negative for chest pain.   Respiratory:  Negative for shortness of breath.    Skin:  Positive for poor wound healing.   Gastrointestinal:  Positive for abdominal pain (intermittent) and nausea.   Neurological:  Positive for weakness.   Psychiatric/Behavioral:  Positive for depression and substance abuse.        1- Pain Assessment  Pain Location: back    Past Medical History:   Diagnosis Date    Acute metabolic encephalopathy 6/24/2022    Anxiety     Arthritis     Benign essential hypertension 08/20/2014    Bleeding disorder     Depression     Diabetes     Diabetes mellitus, type 2     Disc degeneration, lumbar     Headache, tension-type     Hyperlipidemia     Hypothyroidism     Neuropathy     Osteoporosis 09/09/2015    Peripheral neuropathy     Rotator cuff tear, left     Scoliosis     Shoulder pain     LEFT, TORN ROTATOR CUFF S/P FALL    Spinal stenosis      Past Surgical History:   Procedure Laterality Date    APPENDECTOMY      BILATERAL BREAST REDUCTION Bilateral 08/2015    CATARACT EXTRACTION  03/2015    CHOLECYSTECTOMY WITH INTRAOPERATIVE CHOLANGIOGRAM N/A 3/27/2022    Procedure: CHOLECYSTECTOMY LAPAROSCOPIC INTRAOPERATIVE CHOLANGIOGRAM;  Surgeon: Aiyana Hill MD;  Location: [facility];  Service: General;  Laterality: N/A;    COLONOSCOPY  06/05/2015    WNL    ERCP N/A 2/25/2022    Procedure: ENDOSCOPIC RETROGRADE  CHOLANGIOPANCREATOGRAPHY with sphincterotomy and balloon sweep;  Surgeon: Chilo Wilhelm MD;  Location: [facility] ENDOSCOPY;  Service: Gastroenterology;  Laterality: N/A;  PRE/POST - CBD stones    ERCP N/A 3/28/2022    Procedure: ENDOSCOPIC RETROGRADE CHOLANGIOPANCREATOGRAPHY WITH SPHINCTEROTOMY AND BALLOON SWEEP;  Surgeon: Chilo Wilhelm MD;  Location: [facility] ENDOSCOPY;  Service: Gastroenterology;  Laterality: N/A;  PRE: COMMON DUCT STONE  POST: COMMON DUCT STONE    KYPHOPLASTY      REDUCTION MAMMAPLASTY      TONSILLECTOMY       Social History     Socioeconomic History    Marital status:     Number of children: 3   Tobacco Use    Smoking status: Former     Packs/day: 1.00     Years: 40.00     Additional pack years: 0.00     Total pack years: 40.00     Types: Cigarettes     Quit date:      Years since quittin.0    Smokeless tobacco: Never   Vaping Use    Vaping Use: Never used   Substance and Sexual Activity    Alcohol use: No    Drug use: No    Sexual activity: Defer       Current Facility-Administered Medications   Medication Dose Route Frequency Provider Last Rate Last Admin    acetaminophen (TYLENOL) tablet 650 mg  650 mg Oral Q4H PRN Kim Barnard MD   650 mg at 24    atorvastatin (LIPITOR) tablet 80 mg  80 mg Oral Nightly Kim Barnard MD   80 mg at 244    bisoprolol (ZEBeta) tablet 5 mg  5 mg Oral Daily Kim Barnard MD   5 mg at 24 1014    cadexomer iodine (IODOSORB) 0.9 % gel 1 application   1 application  Topical Daily PRN Kim Barnard MD        castor oil-balsam peru (VENELEX) ointment 1 Application  1 Application Topical Q12H Nolan Case MD   1 Application at 24 0920    dextrose (D50W) (25 g/50 mL) IV injection 25 g  25 g Intravenous Q15 Min PRN Kim Barnard MD        dextrose (GLUTOSE) oral gel 15 g  15 g Oral Q15 Min PRN Kim Barnard MD        famotidine (PEPCID) tablet 20 mg  20  mg Oral Daily Nolan Case MD   20 mg at 01/30/24 0920    glucagon (GLUCAGEN) injection 1 mg  1 mg Intramuscular Q15 Min PRN Kim Barnard MD        HYDROcodone-acetaminophen (NORCO)  MG per tablet 1 tablet  1 tablet Oral Q6H PRN Nolan Case MD   1 tablet at 01/30/24 0327    insulin lispro (HUMALOG/ADMELOG) injection 2-7 Units  2-7 Units Subcutaneous 4x Daily AC & at Bedtime Kim Barnard MD   2 Units at 01/29/24 2124    lactated ringers infusion  100 mL/hr Intravenous Continuous Derrick Murry  mL/hr at 01/30/24 0919 100 mL/hr at 01/30/24 0919    levothyroxine (SYNTHROID, LEVOTHROID) tablet 112 mcg  112 mcg Oral Once per day on Monday Tuesday Wednesday Thursday Friday Saturday Kim Barnard MD   112 mcg at 01/30/24 0657    levothyroxine (SYNTHROID, LEVOTHROID) tablet 168 mcg  168 mcg Oral Weekly Kim Barnard MD        melatonin tablet 3 mg  3 mg Oral Nightly PRN Kim Barnard MD        Menthol-Zinc Oxide 1 Application  1 Application Topical 4x Daily Nolan Case MD   1 Application at 01/30/24 0920    multivitamin with minerals 1 tablet  1 tablet Oral Daily Kim Barnard MD   1 tablet at 01/30/24 0920    ondansetron ODT (ZOFRAN-ODT) disintegrating tablet 4 mg  4 mg Oral Q6H PRN Kim Barnard MD        Or    ondansetron (ZOFRAN) injection 4 mg  4 mg Intravenous Q6H PRN Kim Barnard MD        Pharmacy Consult   Does not apply Continuous PRN Nolna Case MD        Pharmacy Consult   Does not apply Continuous PRN Nolan Case MD        potassium chloride (K-DUR,KLOR-CON) ER tablet 40 mEq  40 mEq Oral Once Derrick Murry MD        pregabalin (LYRICA) capsule 50 mg  50 mg Oral Daily Kim Barnard MD   50 mg at 01/30/24 0920    vancomycin (VANCOCIN) capsule 125 mg  125 mg Oral Q6H Lei Rodriguez MD   125 mg at 01/30/24 0541    [START ON 2/8/2024] vancomycin  "(VANCOCIN) capsule 125 mg  125 mg Oral Q12H Lei Rodriguez MD        [START ON 2/15/2024] vancomycin (VANCOCIN) capsule 125 mg  125 mg Oral Q24H Lei Rodriguez MD        [START ON 2/22/2024] vancomycin (VANCOCIN) capsule 125 mg  125 mg Oral Every Other Day Lei Rodriguez MD        vitamin B-12 (CYANOCOBALAMIN) tablet 1,000 mcg  1,000 mcg Oral Daily Stingena, Kim Gutiérrez MD   1,000 mcg at 01/30/24 0920     lactated ringers, 100 mL/hr, Last Rate: 100 mL/hr (01/30/24 0919)  Pharmacy Consult,   Pharmacy Consult,         acetaminophen    cadexomer iodine    dextrose    dextrose    glucagon (human recombinant)    HYDROcodone-acetaminophen    melatonin    ondansetron ODT **OR** ondansetron    Pharmacy Consult    Pharmacy Consult    Allergies   Allergen Reactions    Sulfa Antibiotics Unknown - High Severity     Pt says it was a long time ago and I don't remember but it wasn't good.      Attest that current medications reviewed  including but not limited to prescriptions, over-the counter, herbals and vitamin/mineral/dietary (nutritional) supplements for name, route of administration, type, dose and frequency and are current using all immediate resources available at time of dictation.      Intake/Output Summary (Last 24 hours) at 1/30/2024 0938  Last data filed at 1/29/2024 1247  Gross per 24 hour   Intake 0 ml   Output --   Net 0 ml       Physical Exam:    Diagnostics: Reviewed  /52 (BP Location: Right arm, Patient Position: Lying)   Pulse 68   Temp 97.7 °F (36.5 °C) (Oral)   Resp 16   Ht 172.7 cm (68\")   Wt 95.2 kg (209 lb 14.1 oz)   SpO2 96%   BMI 31.91 kg/m²     Constitutional:       Appearance: Chronically ill-appearing.   Pulmonary:      Effort: Pulmonary effort is normal.   Cardiovascular:      PMI at left midclavicular line.   Edema:     Peripheral edema absent.   Neurological:      Mental Status: Alert and oriented to person, place, and time.   Psychiatric:       "   Mood and Affect: Mood and affect normal.         Speech: Speech normal.         Behavior: Behavior is cooperative.         Thought Content: Thought content normal.         Cognition and Memory: Cognition normal.         Judgment: Judgment normal.       Patient status: Disease state: Controlled with current treatments.  Functional status: Palliative Performance Scale Score: Performance 40% based on the following measures: Ambulation: Mainly in bed, Activity and Evidence of Disease: Unable to do any work, extensive evidence of disease, Self-Care: Mainly assistance required,  Intake: Normal or reduced, LOC: Full, drowsy or confusion   Nutritional status: Albumin  2.1 on 1/30/2024 Body mass index is 31.91 kg/m².    Family support: The patient receives support from her children..  Advance Directives: Advance Directive Status: Patient has advance directive, copy in chart   POA/Healthcare surrogate-ashley Allison.       Impression/Problem List:    Sepsis   Recurrent c.difficile colitis  Acute UTI  Chronic kidney disease stage IIIa  Seizure disorder  Neuropathic pain   Diabetic left foot wound  Stage II sacral wound  Diabetes mellitus type II         Recommendations/Plan:  Provide support with goals of care         2.  Palliative care encounter  I spoke with patient regarding goals of care. She has a fairly good understanding of her conditions, both acute and chronic. She refers to a recent conversation she had with Dr Case. She said they had a discussion about surgery if no improvement with c-diff and she doesn't think that is something she would want to do.  She shares with me that since 2022 she has rapid decline in her health that has led to multiple hospitalizations and stays at rehab. Most recently she has been dealing with c-diff and she shares with me how it has affected her quality of life. It has become difficult for her to attend functions because of uncertainty if she will have an accident or have  to change her clothes. As a result, she doesn't engage in may social activities. She does reside at home with her son, Derrick. She reports he takes her to all her appointments and assist with what ever she needs. Her other son, Josse works full time and has a busy time with his children so not as helpful. She does have a living will on file and it designates her sons as health care surrogates. We did discuss her wishes regarding CPR and ventilator and she agrees with her son decision for NO CPR and ventilator. She tells me she has had some recent conversation with her son about how much more she can endure. I did provide her with information regarding palliative care, [facility] and [facility]. I think she could benefit from [facility] (symptoms management and continued goals of care conversation) and will discuss with her son later tonight when he visits and let me know. At this time her goal is to continue with treatment for c-diff, go to rehab and then return home, however if no improvement she is considering comfort care. She has already told her son, Derrick her wishes regarding burial etc. She was very appreciative of conversation. I spoke with PAUL Avila.       Thank you for this consult and allowing us to participate in patient's plan of care. Palliative Care Team will sign off.     Time spent:45 minutes spent reviewing medical and medication records, assessing and examining patient, discussing with family, answering questions, providing some guidance about a plan and documentation of care, and coordinating care with other healthcare members, with > 50% time spent face to face.   30 minutes spent on advance care planning.    Lizette Burleson, SERGE  1/30/2024  09:38 EST

## 2024-01-30 NOTE — PROGRESS NOTES
ID PROGRESS NOTE    CC: f/u C diff    Subj: Her mental status is markedly improved today.  She is awake, alert, sitting on the side of the bed working with physical therapy.  She says her diarrhea continues.  She has not yet noticed any significant improvement.  She is afebrile.    Medications:    Current Facility-Administered Medications:     acetaminophen (TYLENOL) tablet 650 mg, 650 mg, Oral, Q4H PRN, Kim Barnard MD, 650 mg at 01/28/24 2054    atorvastatin (LIPITOR) tablet 80 mg, 80 mg, Oral, Nightly, Kim Barnard MD, 80 mg at 01/29/24 2124    bisoprolol (ZEBeta) tablet 5 mg, 5 mg, Oral, Daily, Kim Barnard MD, 5 mg at 01/28/24 1014    cadexomer iodine (IODOSORB) 0.9 % gel 1 application , 1 application , Topical, Daily PRN, Kim Barnard MD    castor oil-balsam peru (VENELEX) ointment 1 Application, 1 Application, Topical, Q12H, Nolan Case MD, 1 Application at 01/29/24 2125    cefTRIAXone (ROCEPHIN) 2,000 mg in sodium chloride 0.9 % 100 mL IVPB-VTB, 2,000 mg, Intravenous, Q24H, Kim Barnard MD, Last Rate: 200 mL/hr at 01/29/24 2125, 2,000 mg at 01/29/24 2125    dextrose (D50W) (25 g/50 mL) IV injection 25 g, 25 g, Intravenous, Q15 Min PRN, Kim Barnard MD    dextrose (GLUTOSE) oral gel 15 g, 15 g, Oral, Q15 Min PRN, Kim Barnard MD    famotidine (PEPCID) tablet 20 mg, 20 mg, Oral, Daily, Nolan Case MD, 20 mg at 01/29/24 0939    glucagon (GLUCAGEN) injection 1 mg, 1 mg, Intramuscular, Q15 Min PRN, Kim Barnard MD    HYDROcodone-acetaminophen (NORCO)  MG per tablet 1 tablet, 1 tablet, Oral, Q6H PRN, Nolan Case MD, 1 tablet at 01/30/24 0327    insulin lispro (HUMALOG/ADMELOG) injection 2-7 Units, 2-7 Units, Subcutaneous, 4x Daily AC & at Bedtime, Stingl, Kim Gutiérrez MD, 2 Units at 01/29/24 2124    lactated ringers infusion, 100 mL/hr, Intravenous, Continuous, Derrick Murry MD, Last Rate:  100 mL/hr at 01/29/24 2217, 100 mL/hr at 01/29/24 2217    levothyroxine (SYNTHROID, LEVOTHROID) tablet 112 mcg, 112 mcg, Oral, Once per day on Monday Tuesday Wednesday Thursday Friday Saturday, Kim Barnard MD, 112 mcg at 01/30/24 0657    levothyroxine (SYNTHROID, LEVOTHROID) tablet 168 mcg, 168 mcg, Oral, Weekly, Kim Barnard MD    magnesium sulfate 2g/50 mL (PREMIX) infusion, 2 g, Intravenous, Once, Derrick Murry MD    melatonin tablet 3 mg, 3 mg, Oral, Nightly PRN, Kim Barnard MD    Menthol-Zinc Oxide 1 Application, 1 Application, Topical, 4x Daily, Nolan Case MD, 1 Application at 01/29/24 2125    multivitamin with minerals 1 tablet, 1 tablet, Oral, Daily, Kim Barnard MD, 1 tablet at 01/29/24 0940    ondansetron ODT (ZOFRAN-ODT) disintegrating tablet 4 mg, 4 mg, Oral, Q6H PRN **OR** ondansetron (ZOFRAN) injection 4 mg, 4 mg, Intravenous, Q6H PRN, Kim Barnard MD    Pharmacy Consult, , Does not apply, Continuous PRN, Nolan Case MD    Pharmacy Consult, , Does not apply, Continuous PRN, Nolan Case MD    potassium chloride (K-DUR,KLOR-CON) ER tablet 40 mEq, 40 mEq, Oral, Once, Derrick Murry MD    pregabalin (LYRICA) capsule 50 mg, 50 mg, Oral, Daily, Kim Barnard MD, 50 mg at 01/29/24 0940    vancomycin (VANCOCIN) capsule 125 mg, 125 mg, Oral, Q6H, Lei Rodriguez MD, 125 mg at 01/30/24 0541    [START ON 2/8/2024] vancomycin (VANCOCIN) capsule 125 mg, 125 mg, Oral, Q12H, Lei Rodriguez MD    [START ON 2/15/2024] vancomycin (VANCOCIN) capsule 125 mg, 125 mg, Oral, Q24H, Lei Rodriguez MD    [START ON 2/22/2024] vancomycin (VANCOCIN) capsule 125 mg, 125 mg, Oral, Every Other Day, Lei Rodriguez MD    vitamin B-12 (CYANOCOBALAMIN) tablet 1,000 mcg, 1,000 mcg, Oral, Daily, StinglKim MD, 1,000 mcg at 01/29/24 0940      Objective   Vital Signs    Temp:  [97.7 °F (36.5 °C)-100.2 °F (37.9 °C)] 97.7 °F (36.5 °C)  Heart Rate:  [68-80] 68  Resp:  [16-18] 16  BP: (129-158)/(52-54) 135/52    Physical Exam:   General: Awake, alert, much more comfortable, sitting on side of bed working with physical therapist  Eyes: no scleral icterus  Cardiovascular: NR  Respiratory: normal work of breathing; no wheezing  GI: Abdomen is nondistended  :  no Baig catheter  MSK: Foot is bandaged; no surrounding erythema  Skin: Chronic venous stasis changes  Neurological: Alert and oriented x 3  Psychiatric: Normal mood and affect     Labs:   CBC, CMP, and blood cultures reviewed today  Lab Results   Component Value Date    WBC 23.88 (H) 01/30/2024    HGB 10.2 (L) 01/30/2024    HCT 31.2 (L) 01/30/2024    MCV 82.1 01/30/2024     01/30/2024     Lab Results   Component Value Date    GLUCOSE 85 01/30/2024    BUN 48 (H) 01/30/2024    CREATININE 2.20 (H) 01/30/2024    EGFRIFNONA 51 (L) 02/28/2022    EGFRIFAFRI 50 (L) 01/18/2021    BCR 21.8 01/30/2024    CO2 21.0 (L) 01/30/2024    CALCIUM 7.5 (L) 01/30/2024    PROTENTOTREF 7.2 06/02/2023    ALBUMIN 2.1 (L) 01/30/2024    LABIL2 1.3 06/02/2023    AST 18 01/30/2024    ALT 11 01/30/2024     Lab Results   Component Value Date    HGBA1C 8.20 (H) 12/24/2023     Urinalysis with too numerous to count white blood cells, 3-6 squamous cells, large LE  Procalcitonin 0.79  Lactate 2.4      Microbiology:  1/27 RPP: Negative  1/27 blood cultures: Negative to date  1/28 C. difficile PCR positive but antigen negative    Prior radiology:  CT abdomen pelvis consistent with pancolitis    ASSESSMENT/PLAN:  Sepsis due to undetermined organism   recurrent C. difficile colitis  Acute kidney injury  CKD 3a  Recent COVID-19 infection-resolved  Metabolic acidosis  Confusion-possible polypharmacy  Uncontrolled type 2 diabetes    Fever curve improved and clinically she looks remarkably better.  Even though she was C. difficile PCR positive but antigen negative,  I think she likely has C. difficile infection based on her symptoms, other labs, and her CT imaging findings.    There is no evidence of other infection and her blood cultures are negative so I will discontinue her ceftriaxone.    Discussed with pharmacist yesterday, and he is setting her up with the patient assistance program so that she can get Dificid covered for a reasonable price.  Until that time, continue with vancomycin 125 mg p.o. every 6 hours.    Discussed with Dr. Case.  ID will follow.

## 2024-01-30 NOTE — PLAN OF CARE
Goal Outcome Evaluation:     Pt having multiple bms. Q2 turn . Pt on low airloss. Po vanc. Heel boots on. Wound care done. Bladder scan 3 x. Will continue to monitor.

## 2024-01-30 NOTE — PROGRESS NOTES
[facility] Progress Note      Patient Name: Halie Guidry  : 1940  MRN: [id number]  Primary Care Physician:  Luis Felipe Flowers MD  Date of admission: 2024    Subjective     Interval History:   Still confused; feels weak  Denies shortness of breath on 1 L/min  UOP yesterday 900 mL, with an additional void not measured  Urinary retention, requiring periodic I/O catheterization    Review of Systems:   As noted above    Objective     Vitals:   Temp:  [97.7 °F (36.5 °C)-99.7 °F (37.6 °C)] 97.7 °F (36.5 °C)  Heart Rate:  [70-80] 80  Resp:  [16-18] 18  BP: (111-129)/(45-53) 129/53  Flow (L/min):  [1] 1    Intake/Output Summary (Last 24 hours) at 2024  Last data filed at 2024 1247  Gross per 24 hour   Intake 120 ml   Output 900 ml   Net -780 ml       Physical Exam:    General Appearance: alert, slightly confused, chronically ill, overweight  Skin: warm and dry  HEENT: nonicteric sclera, dry MM  Neck: supple, no JVD  Lungs: Coarse, but no wheezing; not labored on 1 L/min  Heart: RRR, normal S1 and S2  Abdomen: soft, tender but no G or R, nondistended, BS +  : no palpable bladder  Extremities: no edema, cyanosis or clubbing  Neuro: Moves all extremities    Scheduled Meds:     atorvastatin, 80 mg, Oral, Nightly  bisoprolol, 5 mg, Oral, Daily  castor oil-balsam peru, 1 Application, Topical, Q12H  cefTRIAXone, 2,000 mg, Intravenous, Q24H  famotidine, 20 mg, Oral, Daily  insulin lispro, 2-7 Units, Subcutaneous, 4x Daily AC & at Bedtime  levothyroxine, 112 mcg, Oral, Once per day on   levothyroxine, 168 mcg, Oral, Weekly  Menthol-Zinc Oxide, 1 Application, Topical, 4x Daily  multivitamin with minerals, 1 tablet, Oral, Daily  pregabalin, 50 mg, Oral, Daily  vancomycin, 125 mg, Oral, Q6H  [START ON 2024] vancomycin, 125 mg, Oral, Q12H  [START ON 2/15/2024] vancomycin, 125 mg, Oral, Q24H  [START ON 2024]  vancomycin, 125 mg, Oral, Every Other Day  cyanocobalamin, 1,000 mcg, Oral, Daily      IV Meds:   Pharmacy Consult,   Pharmacy Consult,   sodium bicarbonate, 150 mEq, Last Rate: 150 mEq (01/29/24 0940)        Results Reviewed:   I have personally reviewed the results from the time of this admission to 1/29/2024 20:12 EST     Results from last 7 days   Lab Units 01/29/24  0504 01/28/24  0928 01/27/24  1615   SODIUM mmol/L 139 139 137   POTASSIUM mmol/L 3.1* 4.0 4.5   CHLORIDE mmol/L 107 110* 104   CO2 mmol/L 15.3* 13.3* 14.0*   BUN mg/dL 61* 67* 68*   CREATININE mg/dL 3.12* 4.05* 4.99*   CALCIUM mg/dL 7.5* 7.6* 9.0   BILIRUBIN mg/dL  --   --  0.3   ALK PHOS U/L  --   --  101   ALT (SGPT) U/L  --   --  13   AST (SGOT) U/L  --   --  23   GLUCOSE mg/dL 117* 158* 173*       Estimated Creatinine Clearance: 16.4 mL/min (A) (by C-G formula based on SCr of 3.12 mg/dL (H)).    Results from last 7 days   Lab Units 01/29/24  0504 01/27/24  1615   MAGNESIUM mg/dL 2.0 2.1   PHOSPHORUS mg/dL 4.8*  --              Results from last 7 days   Lab Units 01/29/24  0504 01/28/24  0928 01/27/24  1615   WBC 10*3/mm3 22.24* 20.38* 29.84*   HEMOGLOBIN g/dL 10.8* 10.4* 12.5   PLATELETS 10*3/mm3 338 304 417             Assessment / Plan     ASSESSMENT:  1.  VIPUL, nonoliguric, improving prerenal due to volume depletion from diarrhea and poor appetite, with limited renal autoregulation due to lisinopril.  Low potassium; likely NAGMA.  Remains on bicarbonate drip.  2.  Toe ulcers both feet and sacral decubitus  3.  Recurrent C. difficile colitis  4.  DM2  5.  Anemia  6.  Recent COVID infection      PLAN:  1.  Replace potassium  2.  Stop bicarbonate drip given significant hypokalemia; will give LR instead  3.  Surveillance labs    Thank you for involving us in the care of Halie JANIE Petersmary.  Please feel free to call with any questions.    Derrick Murry MD  01/29/24  20:12 [facility]    [facility]  [phone]    Please note that portions of this note were completed with a voice recognition program.

## 2024-01-30 NOTE — PROGRESS NOTES
[facility] Progress Note      Patient Name: Halie Guidry  : 1940  MRN: [id number]  Primary Care Physician:  Luis Felipe Flowers MD  Date of admission: 2024    Subjective     Interval History:   Diarrhea continues  IVF infusing  Good oxygen saturations on just 1 L/min  Urinary retention, requiring periodic I/O catheterization    Review of Systems:   As noted above    Objective     Vitals:   Temp:  [97.7 °F (36.5 °C)-100.2 °F (37.9 °C)] 98.1 °F (36.7 °C)  Heart Rate:  [68-78] 74  Resp:  [16] 16  BP: (135-158)/(52-65) 147/65  Flow (L/min):  [1] 1  No intake or output data in the 24 hours ending 24 1528      Physical Exam:    I did not examine the patient today to reduce exposure to COVID-19  I last examined the patient yesterday  I reviewed physical examinations performed today by other providers    Scheduled Meds:     atorvastatin, 80 mg, Oral, Nightly  bisoprolol, 5 mg, Oral, Daily  castor oil-balsam peru, 1 Application, Topical, Q12H  famotidine, 20 mg, Oral, Daily  insulin lispro, 2-7 Units, Subcutaneous, 4x Daily AC & at Bedtime  levothyroxine, 112 mcg, Oral, Once per day on   levothyroxine, 168 mcg, Oral, Weekly  Menthol-Zinc Oxide, 1 Application, Topical, 4x Daily  multivitamin with minerals, 1 tablet, Oral, Daily  pregabalin, 50 mg, Oral, Daily  vancomycin, 125 mg, Oral, Q6H  [START ON 2024] vancomycin, 125 mg, Oral, Q12H  [START ON 2/15/2024] vancomycin, 125 mg, Oral, Q24H  [START ON 2024] vancomycin, 125 mg, Oral, Every Other Day  cyanocobalamin, 1,000 mcg, Oral, Daily      IV Meds:   lactated ringers, 100 mL/hr, Last Rate: 100 mL/hr (24 0919)  Pharmacy Consult,   Pharmacy Consult,         Results Reviewed:   I have personally reviewed the results from the time of this admission to 2024 15:28 EST     Results from last 7 days   Lab Units 24  1501 24  0513 24  0504  01/28/24  0928 01/27/24  1615   SODIUM mmol/L  --  143 139 139 137   POTASSIUM mmol/L 3.4* 2.9* 3.1* 4.0 4.5   CHLORIDE mmol/L  --  110* 107 110* 104   CO2 mmol/L  --  21.0* 15.3* 13.3* 14.0*   BUN mg/dL  --  48* 61* 67* 68*   CREATININE mg/dL  --  2.20* 3.12* 4.05* 4.99*   CALCIUM mg/dL  --  7.5* 7.5* 7.6* 9.0   BILIRUBIN mg/dL  --  <0.2  --   --  0.3   ALK PHOS U/L  --  115  --   --  101   ALT (SGPT) U/L  --  11  --   --  13   AST (SGOT) U/L  --  18  --   --  23   GLUCOSE mg/dL  --  85 117* 158* 173*       Estimated Creatinine Clearance: 23.4 mL/min (A) (by C-G formula based on SCr of 2.2 mg/dL (H)).    Results from last 7 days   Lab Units 01/30/24  0513 01/29/24  0504 01/27/24  1615   MAGNESIUM mg/dL 1.7 2.0 2.1   PHOSPHORUS mg/dL  --  4.8*  --              Results from last 7 days   Lab Units 01/30/24  0513 01/29/24  0504 01/28/24  0928 01/27/24  1615   WBC 10*3/mm3 23.88* 22.24* 20.38* 29.84*   HEMOGLOBIN g/dL 10.2* 10.8* 10.4* 12.5   PLATELETS 10*3/mm3 311 338 304 417             Assessment / Plan     ASSESSMENT:  1.  VIPUL, nonoliguric, improving:  prerenal due to volume depletion from diarrhea and poor appetite, with limited renal autoregulation due to lisinopril.  Low potassium and magnesium; likely NAGMA.  Currently on LR.  2.  Toe ulcers both feet and sacral decubitus  3.  Recurrent C. difficile colitis  4.  DM2  5.  Anemia  6.  Recent COVID infection      PLAN:  1.  Replace potassium and magnesium  2.  Continue LR at current rate until diarrhea subsides  3.  Surveillance labs    Thank you for involving us in the care of Halie Guidry.  Please feel free to call with any questions.    Derrick Murry MD  01/30/24  15:28 [facility]    [facility]  [phone]    Please note that portions of this note were completed with a voice recognition program.

## 2024-01-30 NOTE — PLAN OF CARE
Goal Outcome Evaluation:      VSS. Cdiff precautions continue. IVF @ 100ml/hr. Pt more oriented today. Wound care done. Heel boots on. Low airloss mattress. Straight cathed x1. WBC elevated. Q2 turn. Incontinence care done.

## 2024-01-30 NOTE — PLAN OF CARE
Goal Outcome Evaluation:      Progress: improving    Outcome Evaluation: VSS. AOx4 this shift. 1L NC. Blood sugar at hs of 191. 2 units ssi given. PRN norco x2 doses given overnight. LR @ 100mL/hr. Wound care completed. LALM, Q2 turn. Heel boots in place. Bladder scan for no void. >682. Straight cath for 850mL. Several BMs overnight. AM labs show WBC - 23.88. K - 2.9. Cr - 2.2. MD notified. Klor-con x1 dose ordered/given.                                         Hepatitis B

## 2024-01-30 NOTE — PROGRESS NOTES
[facility]    [phone]   LOS: 3 days     Name: Halie Guidry  Age/Sex: 83 y.o. female  :  1940        PCP: Luis Felipe Flowers MD  Chief Complaint   Patient presents with    Altered Mental Status      Subjective   She remains awake alert and oriented today.  Still with significant stool output over the last 24 hours.  Her belly pain is about the same as yesterday.  She is eating better today though.    atorvastatin, 80 mg, Oral, Nightly  bisoprolol, 5 mg, Oral, Daily  castor oil-balsam peru, 1 Application, Topical, Q12H  famotidine, 20 mg, Oral, Daily  insulin lispro, 2-7 Units, Subcutaneous, 4x Daily AC & at Bedtime  levothyroxine, 112 mcg, Oral, Once per day on   levothyroxine, 168 mcg, Oral, Weekly  Menthol-Zinc Oxide, 1 Application, Topical, 4x Daily  multivitamin with minerals, 1 tablet, Oral, Daily  pregabalin, 50 mg, Oral, Daily  vancomycin, 125 mg, Oral, Q6H  [START ON 2024] vancomycin, 125 mg, Oral, Q12H  [START ON 2/15/2024] vancomycin, 125 mg, Oral, Q24H  [START ON 2024] vancomycin, 125 mg, Oral, Every Other Day  cyanocobalamin, 1,000 mcg, Oral, Daily      lactated ringers, 100 mL/hr, Last Rate: 100 mL/hr (24 0919)  Pharmacy Consult,   Pharmacy Consult,         Objective   Vital Signs  Temp:  [97.7 °F (36.5 °C)-100.2 °F (37.9 °C)] 98.1 °F (36.7 °C)  Heart Rate:  [68-78] 74  Resp:  [16] 16  BP: (135-158)/(52-65) 147/65  Body mass index is 31.91 kg/m².  No intake or output data in the 24 hours ending 24 1258      Physical Exam  Vitals and nursing note reviewed.   Constitutional:       General: She is not in acute distress.     Appearance: She is obese. She is ill-appearing.      Comments: Lethargic but arousable   Cardiovascular:      Rate and Rhythm: Normal rate and regular rhythm.   Pulmonary:      Effort: No respiratory distress.      Breath sounds: Normal breath sounds.   Abdominal:      General: Bowel  sounds are normal.      Palpations: Abdomen is soft.      Tenderness: There is abdominal tenderness.   Neurological:      Mental Status: She is oriented to person, place, and time.      Comments: Awake alert following commands no focal neurologic deficits       Results Review:       I reviewed the patient's new clinical results.  Results from last 7 days   Lab Units 01/30/24  0513 01/29/24  0504 01/28/24  0928 01/27/24  1615   WBC 10*3/mm3 23.88* 22.24* 20.38* 29.84*   HEMOGLOBIN g/dL 10.2* 10.8* 10.4* 12.5   PLATELETS 10*3/mm3 311 338 304 417     Results from last 7 days   Lab Units 01/30/24  0513 01/29/24  0504 01/28/24  0928 01/27/24  1615   SODIUM mmol/L 143 139 139 137   POTASSIUM mmol/L 2.9* 3.1* 4.0 4.5   CHLORIDE mmol/L 110* 107 110* 104   CO2 mmol/L 21.0* 15.3* 13.3* 14.0*   BUN mg/dL 48* 61* 67* 68*   CREATININE mg/dL 2.20* 3.12* 4.05* 4.99*   CALCIUM mg/dL 7.5* 7.5* 7.6* 9.0   MAGNESIUM mg/dL 1.7 2.0  --  2.1   PHOSPHORUS mg/dL  --  4.8*  --   --    Estimated Creatinine Clearance: 23.4 mL/min (A) (by C-G formula based on SCr of 2.2 mg/dL (H)).  Lab Results   Component Value Date    HGBA1C 8.20 (H) 12/24/2023    HGBA1C 11.80 (H) 06/02/2023    HGBA1C 7.70 (H) 09/01/2022     Glucose   Date/Time Value Ref Range Status   01/30/2024 1141 180 (H) 70 - 130 mg/dL Final   01/30/2024 0732 97 70 - 130 mg/dL Final   01/29/2024 2028 191 (H) 70 - 130 mg/dL Final   01/29/2024 1728 201 (H) 70 - 130 mg/dL Final   01/29/2024 1212 142 (H) 70 - 130 mg/dL Final   01/29/2024 0832 163 (H) 70 - 130 mg/dL Final   01/28/2024 2032 222 (H) 70 - 130 mg/dL Final   01/28/2024 1724 199 (H) 70 - 130 mg/dL Final     Assessment & Plan   Active Hospital Problems    Diagnosis  POA    **Sepsis [A41.9]  Yes    Stage 3a chronic kidney disease [N18.31]  Yes    Metabolic encephalopathy [G93.41]  Yes    Pressure injury of buttock, stage 2 [L89.302]  Yes    C. difficile diarrhea [A04.72]  Yes    Neuropathic pain [M79.2]  Yes    Acute UTI (urinary  tract infection) [N39.0]  Yes    VIPUL (acute kidney injury) [N17.9]  Yes    Seizure [R56.9]  Yes    Hyperlipidemia [E78.5]  Yes    Type 2 diabetes mellitus with hyperglycemia, with long-term current use of insulin [E11.65, Z79.4]  Not Applicable    Benign essential hypertension [I10]  Yes      Resolved Hospital Problems   No resolved problems to display.       PLAN  This is an 83-year-old lady with a history of chronic kidney disease stage IIIa, hypertension, hyperlipidemia, type 2 diabetes and a fairly complicated recent past medical history with diabetic foot wound infected with multidrug-resistant Enterobacter and subsequent acute renal failure and C. difficile colitis recurrence.  She presents on this hospitalization with diarrhea and altered mental status and is found to have severe sepsis with a white blood cell count of 29 acute renal failure and metabolic encephalopathy likely related to recurrent C. difficile colitis.  -White blood cell count remains elevated CT scan independently reviewed and shows pancolitis consistent with C. difficile infection  -Her C. difficile testing was positive but her toxin screen was negative.  I still think this is an active infection.  Discussed with infectious disease today and they agree.  -Her renal function slowly improving will give potassium today to correct hypokalemia, magnesium replacement ordered  -Remains on LR  -Appreciate nephrology and infectious disease input.  -Mechanical DVT prophylaxis  -DNR    Disposition  Expected Discharge Date: 2/5/2024; Expected Discharge Time:        Nolan Case MD  [facility]  01/30/24  12:58 EST

## 2024-01-31 LAB
ALBUMIN SERPL-MCNC: 2.2 G/DL (ref 3.5–5.2)
ALBUMIN/GLOB SERPL: 0.8 G/DL
ALP SERPL-CCNC: 93 U/L (ref 39–117)
ALT SERPL W P-5'-P-CCNC: 7 U/L (ref 1–33)
ANION GAP SERPL CALCULATED.3IONS-SCNC: 13.4 MMOL/L (ref 5–15)
ANISOCYTOSIS BLD QL: ABNORMAL
AST SERPL-CCNC: 19 U/L (ref 1–32)
BILIRUB SERPL-MCNC: 0.3 MG/DL (ref 0–1.2)
BUN SERPL-MCNC: 34 MG/DL (ref 8–23)
BUN/CREAT SERPL: 24.1 (ref 7–25)
CALCIUM SPEC-SCNC: 7.4 MG/DL (ref 8.6–10.5)
CHLORIDE SERPL-SCNC: 110 MMOL/L (ref 98–107)
CO2 SERPL-SCNC: 18.6 MMOL/L (ref 22–29)
CREAT SERPL-MCNC: 1.41 MG/DL (ref 0.57–1)
DEPRECATED RDW RBC AUTO: 49.8 FL (ref 37–54)
EGFRCR SERPLBLD CKD-EPI 2021: 37.1 ML/MIN/1.73
EOSINOPHIL # BLD MANUAL: 1.22 10*3/MM3 (ref 0–0.4)
EOSINOPHIL NFR BLD MANUAL: 5.1 % (ref 0.3–6.2)
ERYTHROCYTE [DISTWIDTH] IN BLOOD BY AUTOMATED COUNT: 16 % (ref 12.3–15.4)
GLOBULIN UR ELPH-MCNC: 2.9 GM/DL
GLUCOSE BLDC GLUCOMTR-MCNC: 149 MG/DL (ref 70–130)
GLUCOSE BLDC GLUCOMTR-MCNC: 178 MG/DL (ref 70–130)
GLUCOSE BLDC GLUCOMTR-MCNC: 198 MG/DL (ref 70–130)
GLUCOSE BLDC GLUCOMTR-MCNC: 205 MG/DL (ref 70–130)
GLUCOSE BLDC GLUCOMTR-MCNC: 221 MG/DL (ref 70–130)
GLUCOSE BLDC GLUCOMTR-MCNC: 292 MG/DL (ref 70–130)
GLUCOSE SERPL-MCNC: 189 MG/DL (ref 65–99)
HCT VFR BLD AUTO: 34.2 % (ref 34–46.6)
HGB BLD-MCNC: 11.3 G/DL (ref 12–15.9)
LYMPHOCYTES # BLD MANUAL: 0.72 10*3/MM3 (ref 0.7–3.1)
LYMPHOCYTES NFR BLD MANUAL: 2 % (ref 5–12)
MAGNESIUM SERPL-MCNC: 2 MG/DL (ref 1.6–2.4)
MCH RBC QN AUTO: 28.2 PG (ref 26.6–33)
MCHC RBC AUTO-ENTMCNC: 33 G/DL (ref 31.5–35.7)
MCV RBC AUTO: 85.3 FL (ref 79–97)
METAMYELOCYTES NFR BLD MANUAL: 1 % (ref 0–0)
MICROCYTES BLD QL: ABNORMAL
MONOCYTES # BLD: 0.48 10*3/MM3 (ref 0.1–0.9)
MYELOCYTES NFR BLD MANUAL: 2 % (ref 0–0)
NEUTROPHILS # BLD AUTO: 20.73 10*3/MM3 (ref 1.7–7)
NEUTROPHILS NFR BLD MANUAL: 86.9 % (ref 42.7–76)
NRBC BLD AUTO-RTO: 0.2 /100 WBC (ref 0–0.2)
PHOSPHATE SERPL-MCNC: 2.7 MG/DL (ref 2.5–4.5)
PLAT MORPH BLD: NORMAL
PLATELET # BLD AUTO: 327 10*3/MM3 (ref 140–450)
PMV BLD AUTO: 9.7 FL (ref 6–12)
POIKILOCYTOSIS BLD QL SMEAR: ABNORMAL
POLYCHROMASIA BLD QL SMEAR: ABNORMAL
POTASSIUM SERPL-SCNC: 3.3 MMOL/L (ref 3.5–5.2)
PROT SERPL-MCNC: 5.1 G/DL (ref 6–8.5)
RBC # BLD AUTO: 4.01 10*6/MM3 (ref 3.77–5.28)
SODIUM SERPL-SCNC: 142 MMOL/L (ref 136–145)
VARIANT LYMPHS NFR BLD MANUAL: 1 % (ref 0–5)
VARIANT LYMPHS NFR BLD MANUAL: 2 % (ref 19.6–45.3)
WBC MORPH BLD: NORMAL
WBC NRBC COR # BLD AUTO: 23.86 10*3/MM3 (ref 3.4–10.8)

## 2024-01-31 PROCEDURE — 84100 ASSAY OF PHOSPHORUS: CPT | Performed by: INTERNAL MEDICINE

## 2024-01-31 PROCEDURE — 99232 SBSQ HOSP IP/OBS MODERATE 35: CPT | Performed by: INTERNAL MEDICINE

## 2024-01-31 PROCEDURE — 83735 ASSAY OF MAGNESIUM: CPT | Performed by: INTERNAL MEDICINE

## 2024-01-31 PROCEDURE — 25810000003 LACTATED RINGERS PER 1000 ML: Performed by: INTERNAL MEDICINE

## 2024-01-31 PROCEDURE — 82948 REAGENT STRIP/BLOOD GLUCOSE: CPT

## 2024-01-31 PROCEDURE — 85007 BL SMEAR W/DIFF WBC COUNT: CPT | Performed by: INTERNAL MEDICINE

## 2024-01-31 PROCEDURE — 25010000002 ONDANSETRON PER 1 MG: Performed by: INTERNAL MEDICINE

## 2024-01-31 PROCEDURE — 80053 COMPREHEN METABOLIC PANEL: CPT | Performed by: INTERNAL MEDICINE

## 2024-01-31 PROCEDURE — 63710000001 INSULIN LISPRO (HUMAN) PER 5 UNITS: Performed by: INTERNAL MEDICINE

## 2024-01-31 PROCEDURE — 63710000001 INSULIN GLARGINE PER 5 UNITS: Performed by: STUDENT IN AN ORGANIZED HEALTH CARE EDUCATION/TRAINING PROGRAM

## 2024-01-31 PROCEDURE — 85025 COMPLETE CBC W/AUTO DIFF WBC: CPT | Performed by: INTERNAL MEDICINE

## 2024-01-31 RX ADMIN — SODIUM CHLORIDE, POTASSIUM CHLORIDE, SODIUM LACTATE AND CALCIUM CHLORIDE 100 ML/HR: 600; 310; 30; 20 INJECTION, SOLUTION INTRAVENOUS at 06:10

## 2024-01-31 RX ADMIN — LEVOTHYROXINE SODIUM 112 MCG: 112 TABLET ORAL at 06:10

## 2024-01-31 RX ADMIN — INSULIN LISPRO 3 UNITS: 100 INJECTION, SOLUTION INTRAVENOUS; SUBCUTANEOUS at 17:09

## 2024-01-31 RX ADMIN — SODIUM CHLORIDE, POTASSIUM CHLORIDE, SODIUM LACTATE AND CALCIUM CHLORIDE 100 ML/HR: 600; 310; 30; 20 INJECTION, SOLUTION INTRAVENOUS at 15:47

## 2024-01-31 RX ADMIN — INSULIN GLARGINE 7 UNITS: 100 INJECTION, SOLUTION SUBCUTANEOUS at 20:37

## 2024-01-31 RX ADMIN — FIDAXOMICIN 200 MG: 200 TABLET, FILM COATED ORAL at 20:37

## 2024-01-31 RX ADMIN — Medication 1000 MCG: at 08:03

## 2024-01-31 RX ADMIN — ONDANSETRON 4 MG: 2 INJECTION INTRAMUSCULAR; INTRAVENOUS at 08:09

## 2024-01-31 RX ADMIN — INSULIN LISPRO 2 UNITS: 100 INJECTION, SOLUTION INTRAVENOUS; SUBCUTANEOUS at 08:03

## 2024-01-31 RX ADMIN — CASTOR OIL AND BALSAM, PERU 1 APPLICATION: 788; 87 OINTMENT TOPICAL at 08:12

## 2024-01-31 RX ADMIN — Medication 1 TABLET: at 08:04

## 2024-01-31 RX ADMIN — ANORECTAL OINTMENT 1 APPLICATION: 15.7; .44; 24; 20.6 OINTMENT TOPICAL at 17:11

## 2024-01-31 RX ADMIN — INSULIN LISPRO 4 UNITS: 100 INJECTION, SOLUTION INTRAVENOUS; SUBCUTANEOUS at 12:17

## 2024-01-31 RX ADMIN — ANORECTAL OINTMENT 1 APPLICATION: 15.7; .44; 24; 20.6 OINTMENT TOPICAL at 20:37

## 2024-01-31 RX ADMIN — PREGABALIN 50 MG: 50 CAPSULE ORAL at 08:04

## 2024-01-31 RX ADMIN — FIDAXOMICIN 200 MG: 200 TABLET, FILM COATED ORAL at 12:20

## 2024-01-31 RX ADMIN — ANORECTAL OINTMENT 1 APPLICATION: 15.7; .44; 24; 20.6 OINTMENT TOPICAL at 08:12

## 2024-01-31 RX ADMIN — VANCOMYCIN HYDROCHLORIDE 125 MG: 125 CAPSULE ORAL at 06:11

## 2024-01-31 RX ADMIN — ATORVASTATIN CALCIUM 80 MG: 80 TABLET, FILM COATED ORAL at 20:38

## 2024-01-31 RX ADMIN — FAMOTIDINE 20 MG: 20 TABLET, FILM COATED ORAL at 08:04

## 2024-01-31 RX ADMIN — HYDROCODONE BITARTRATE AND ACETAMINOPHEN 1 TABLET: 10; 325 TABLET ORAL at 15:46

## 2024-01-31 RX ADMIN — BISOPROLOL FUMARATE 5 MG: 5 TABLET, FILM COATED ORAL at 08:04

## 2024-01-31 RX ADMIN — CASTOR OIL AND BALSAM, PERU 1 APPLICATION: 788; 87 OINTMENT TOPICAL at 20:37

## 2024-01-31 NOTE — PROGRESS NOTES
"Assessment Date:  01/31/24    NUTRITION SCREENING      Reason for Encounter Nonhealing wound or pressure ulcer   Diagnosis/Problem Diarrhea and AMS and was found to have severe sepsis. Pt has a hx of CKD IIIa, HTN, T2DM, Diabetic foot wound infected with multidrug resistant Enterobacter and recurrent C. Diff.       PO Diet Diet: Cardiac Diets; Healthy Heart (2-3 Na+); Texture: Regular Texture (IDDSI 7); Fluid Consistency: Thin (IDDSI 0)   Supplements    PO Intake % % of 3 meals at home. Pt reported she doesn't like the food here. Unsure how her PO intake has been while admitted due to no charted PO intake       Medications MAR reviewed by RD   Labs  Listed below, reviewed   Physical Findings Awake, alert, oriented, obese   GI Function Diarrhea, Last BM 1/31   Skin Status Balteral medial sacral spine PI, Left anterior third toe diabetic ulcer, Left posterior heel PI, Right anterior third toe, Left heel PI, Left posterior plantar diabetic ulcer.        Height  Weight  BMI  Weight Trend     Height: 172.7 cm (68\")  Weight: 95.6 kg (210 lb 12.2 oz) (01/31/24 0418)  Body mass index is 32.05 kg/m².  Stable, Other: Pt reported her weight fluctuates normally due to fluid accumulation in her legs.        Nutrition Problem (PES) Increased nutrients needs related to wound healing as evidenced by multiple wounds present.        Intervention/Plan Chris QD at breakfast and Boost plus QD at lunch    RD to follow up per protocol.     Results from last 7 days   Lab Units 01/31/24  0450 01/30/24  1501 01/30/24  0513 01/29/24  0504 01/28/24  0928 01/27/24  1615   SODIUM mmol/L 142  --  143 139   < > 137   POTASSIUM mmol/L 3.3* 3.4* 2.9* 3.1*   < > 4.5   CHLORIDE mmol/L 110*  --  110* 107   < > 104   CO2 mmol/L 18.6*  --  21.0* 15.3*   < > 14.0*   BUN mg/dL 34*  --  48* 61*   < > 68*   CREATININE mg/dL 1.41*  --  2.20* 3.12*   < > 4.99*   CALCIUM mg/dL 7.4*  --  7.5* 7.5*   < > 9.0   BILIRUBIN mg/dL 0.3  --  <0.2  --   --  0.3 "   ALK PHOS U/L 93  --  115  --   --  101   ALT (SGPT) U/L 7  --  11  --   --  13   AST (SGOT) U/L 19  --  18  --   --  23   GLUCOSE mg/dL 189*  --  85 117*   < > 173*    < > = values in this interval not displayed.     Results from last 7 days   Lab Units 01/31/24  0450 01/30/24  0513 01/29/24  0504   MAGNESIUM mg/dL 2.0 1.7 2.0   PHOSPHORUS mg/dL 2.7  --  4.8*   HEMOGLOBIN g/dL 11.3* 10.2* 10.8*   HEMATOCRIT % 34.2 31.2* 33.5*     Lab Results   Component Value Date    HGBA1C 8.20 (H) 12/24/2023         Electronically signed by:  Carson Loyola  01/31/24 12:57 EST

## 2024-01-31 NOTE — PLAN OF CARE
Goal Outcome Evaluation:         VSS;  Aox4 NSR Ax2, RA IVF continued; multiple loose BM's throughout the day; wound care done to coccyx;BS was 224;  PRN pain med given x1;       Nursing report ED to floor  Halie Guidry  83 y.o.  female    HPI (triage note):   Chief Complaint   Patient presents with    Altered Mental Status       Admitting doctor:   Kim Barnard MD    Admitting diagnosis:   The primary encounter diagnosis was Sepsis with acute renal failure, due to unspecified organism, unspecified acute renal failure type, unspecified whether septic shock present. A diagnosis of Encephalopathy was also pertinent to this visit.    Code status:   Current Code Status       Date Active Code Status Order ID Comments User Context       1/28/2024 1607 No CPR (Do Not Attempt to Resuscitate) [id number]  Nolan Case MD Inpatient        Question Answer    Code Status (Patient has no pulse and is not breathing) No CPR (Do Not Attempt to Resuscitate)    Medical Interventions (Patient has pulse or is breathing) Limited Support    Medical Intervention Limits: NO intubation (DNI)     NO dialysis                    Allergies:   Sulfa antibiotics    Past Medical History:  Past Medical History:   Diagnosis Date    Acute metabolic encephalopathy 6/24/2022    Anxiety     Arthritis     Benign essential hypertension 08/20/2014    Bleeding disorder     Depression     Diabetes     Diabetes mellitus, type 2     Disc degeneration, lumbar     Headache, tension-type     Hyperlipidemia     Hypothyroidism     Neuropathy     Osteoporosis 09/09/2015    Peripheral neuropathy     Rotator cuff tear, left     Scoliosis     Shoulder pain     LEFT, TORN ROTATOR CUFF S/P FALL    Spinal stenosis         Weight:       01/31/24  0418   Weight: 95.6 kg (210 lb 12.2 oz)       Most recent vitals:   Vitals:    01/30/24 2345 01/31/24 0418 01/31/24 0759 01/31/24 1300   BP: 142/79  166/64 150/61   BP Location: Left arm  Right arm Right arm    Patient Position: Lying  Lying Lying   Pulse:   78 75   Resp: 16  16 16   Temp: 99.1 °F (37.3 °C)  98.6 °F (37 °C) 98.8 °F (37.1 °C)   TempSrc: Oral  Oral Oral   SpO2:   94% 91%   Weight:  95.6 kg (210 lb 12.2 oz)     Height:           Active LDAs/IV Access:   Lines, Drains & Airways       Active LDAs       Name Placement date Placement time Site Days    Peripheral IV 01/29/24 1642 Anterior;Right Forearm 01/29/24  1642  Forearm  2                          Labs (abnormal labs have a star):   Labs Reviewed   CLOSTRIDIOIDES DIFFICILE TOXIN, PCR - Abnormal; Notable for the following components:       Result Value    Toxigenic C. difficile by PCR Positive (*)     All other components within normal limits    Narrative:     DNA from a toxigenic strain of C.difficile has been detected. Antigen testing for the presence of free C.difficile toxin is currently in progress, to help determine the clinical significance of this PCR result.    COMPREHENSIVE METABOLIC PANEL - Abnormal; Notable for the following components:    Glucose 173 (*)     BUN 68 (*)     Creatinine 4.99 (*)     CO2 14.0 (*)     Albumin 3.1 (*)     Anion Gap 19.0 (*)     eGFR 8.1 (*)     All other components within normal limits    Narrative:     GFR Normal >60  Chronic Kidney Disease <60  Kidney Failure <15    The GFR formula is only valid for adults with stable renal function between ages 18 and 70.   URINALYSIS W/ MICROSCOPIC IF INDICATED (NO CULTURE) - Abnormal; Notable for the following components:    Color, UA Dark Yellow (*)     Appearance, UA Turbid (*)     Ketones, UA Trace (*)     Blood, UA Small (1+) (*)     Protein,  mg/dL (2+) (*)     Leuk Esterase, UA Large (3+) (*)     All other components within normal limits   LACTIC ACID, PLASMA - Abnormal; Notable for the following components:    Lactate 2.4 (*)     All other components within normal limits   PROCALCITONIN - Abnormal; Notable for the following components:    Procalcitonin 0.79 (*)      "All other components within normal limits    Narrative:     As a Marker for Sepsis (Non-Neonates):    1. <0.5 ng/mL represents a low risk of severe sepsis and/or septic shock.  2. >2 ng/mL represents a high risk of severe sepsis and/or septic shock.    As a Marker for Lower Respiratory Tract Infections that require antibiotic therapy:    PCT on Admission    Antibiotic Therapy       6-12 Hrs later    >0.5                Strongly Recommended  >0.25 - <0.5        Recommended   0.1 - 0.25          Discouraged              Remeasure/reassess PCT  <0.1                Strongly Discouraged     Remeasure/reassess PCT    As 28 day mortality risk marker: \"Change in Procalcitonin Result\" (>80% or <=80%) if Day 0 (or Day 1) and Day 4 values are available. Refer to [email or web address]-pct-calculator.[email or web address]    Change in PCT <=80%  A decrease of PCT levels below or equal to 80% defines a positive change in PCT test result representing a higher risk for 28-day all-cause mortality of patients diagnosed with severe sepsis for septic shock.    Change in PCT >80%  A decrease of PCT levels of more than 80% defines a negative change in PCT result representing a lower risk for 28-day all-cause mortality of patients diagnosed with severe sepsis or septic shock.      URINE DRUG SCREEN - Abnormal; Notable for the following components:    Opiate Screen Positive (*)     All other components within normal limits    Narrative:     Negative Thresholds Per Drugs Screened:    Amphetamines                 500 ng/ml  Barbiturates                 200 ng/ml  Benzodiazepines              100 ng/ml  Cocaine                      300 ng/ml  Methadone                    300 ng/ml  Opiates                      300 ng/ml  Oxycodone                    100 ng/ml  THC                           50 ng/ml  Fentanyl                       5 ng/ml      The Normal Value for all drugs tested is negative. This report includes final unconfirmed screening results to be used for " medical treatment purposes only. Unconfirmed results must not be used for non-medical purposes such as employment or legal testing. Clinical consideration should be applied to any drug of abuse test, particularly when unconfirmed results are used.           CBC WITH AUTO DIFFERENTIAL - Abnormal; Notable for the following components:    WBC 29.84 (*)     RDW 16.4 (*)     RDW-SD 54.1 (*)     Neutrophil % 82.3 (*)     Lymphocyte % 5.5 (*)     Eosinophil % 0.1 (*)     Neutrophils, Absolute 24.58 (*)     Monocytes, Absolute 3.16 (*)     All other components within normal limits   URINALYSIS, MICROSCOPIC ONLY - Abnormal; Notable for the following components:    WBC, UA Too Numerous to Count (*)     Bacteria, UA 4+ (*)     Squamous Epithelial Cells, UA 3-6 (*)     All other components within normal limits   BASIC METABOLIC PANEL - Abnormal; Notable for the following components:    Glucose 158 (*)     BUN 67 (*)     Creatinine 4.05 (*)     Chloride 110 (*)     CO2 13.3 (*)     Calcium 7.6 (*)     Anion Gap 15.7 (*)     eGFR 10.5 (*)     All other components within normal limits    Narrative:     GFR Normal >60  Chronic Kidney Disease <60  Kidney Failure <15    The GFR formula is only valid for adults with stable renal function between ages 18 and 70.   CBC (NO DIFF) - Abnormal; Notable for the following components:    WBC 20.38 (*)     RBC 3.74 (*)     Hemoglobin 10.4 (*)     Hematocrit 31.3 (*)     RDW 16.3 (*)     All other components within normal limits   RENAL FUNCTION PANEL - Abnormal; Notable for the following components:    Glucose 117 (*)     BUN 61 (*)     Creatinine 3.12 (*)     Potassium 3.1 (*)     CO2 15.3 (*)     Calcium 7.5 (*)     Albumin 2.4 (*)     Phosphorus 4.8 (*)     Anion Gap 16.7 (*)     eGFR 14.3 (*)     All other components within normal limits    Narrative:     GFR Normal >60  Chronic Kidney Disease <60  Kidney Failure <15    The GFR formula is only valid for adults with stable renal function  between ages 18 and 70.   CBC WITH AUTO DIFFERENTIAL - Abnormal; Notable for the following components:    WBC 22.24 (*)     Hemoglobin 10.8 (*)     Hematocrit 33.5 (*)     RDW 16.3 (*)     Neutrophil % 81.1 (*)     Lymphocyte % 6.2 (*)     Neutrophils, Absolute 18.06 (*)     Monocytes, Absolute 1.77 (*)     All other components within normal limits   CBC (NO DIFF) - Abnormal; Notable for the following components:    WBC 23.88 (*)     Hemoglobin 10.2 (*)     Hematocrit 31.2 (*)     RDW 15.9 (*)     All other components within normal limits   COMPREHENSIVE METABOLIC PANEL - Abnormal; Notable for the following components:    BUN 48 (*)     Creatinine 2.20 (*)     Potassium 2.9 (*)     Chloride 110 (*)     CO2 21.0 (*)     Calcium 7.5 (*)     Total Protein 4.8 (*)     Albumin 2.1 (*)     eGFR 21.7 (*)     All other components within normal limits    Narrative:     GFR Normal >60  Chronic Kidney Disease <60  Kidney Failure <15    The GFR formula is only valid for adults with stable renal function between ages 18 and 70.   POTASSIUM - Abnormal; Notable for the following components:    Potassium 3.4 (*)     All other components within normal limits   COMPREHENSIVE METABOLIC PANEL - Abnormal; Notable for the following components:    Glucose 189 (*)     BUN 34 (*)     Creatinine 1.41 (*)     Potassium 3.3 (*)     Chloride 110 (*)     CO2 18.6 (*)     Calcium 7.4 (*)     Total Protein 5.1 (*)     Albumin 2.2 (*)     eGFR 37.1 (*)     All other components within normal limits    Narrative:     GFR Normal >60  Chronic Kidney Disease <60  Kidney Failure <15    The GFR formula is only valid for adults with stable renal function between ages 18 and 70.   CBC WITH AUTO DIFFERENTIAL - Abnormal; Notable for the following components:    WBC 23.86 (*)     Hemoglobin 11.3 (*)     RDW 16.0 (*)     All other components within normal limits   MANUAL DIFFERENTIAL - Abnormal; Notable for the following components:    Neutrophil % 86.9 (*)      Lymphocyte % 2.0 (*)     Monocyte % 2.0 (*)     Metamyelocyte % 1.0 (*)     Myelocyte % 2.0 (*)     Neutrophils Absolute 20.73 (*)     Eosinophils Absolute 1.22 (*)     All other components within normal limits   POCT GLUCOSE FINGERSTICK - Abnormal; Notable for the following components:    Glucose 176 (*)     All other components within normal limits   POCT GLUCOSE FINGERSTICK - Abnormal; Notable for the following components:    Glucose 176 (*)     All other components within normal limits   POCT GLUCOSE FINGERSTICK - Abnormal; Notable for the following components:    Glucose 162 (*)     All other components within normal limits   POCT GLUCOSE FINGERSTICK - Abnormal; Notable for the following components:    Glucose 161 (*)     All other components within normal limits   POCT GLUCOSE FINGERSTICK - Abnormal; Notable for the following components:    Glucose 199 (*)     All other components within normal limits   POCT GLUCOSE FINGERSTICK - Abnormal; Notable for the following components:    Glucose 222 (*)     All other components within normal limits   POCT GLUCOSE FINGERSTICK - Abnormal; Notable for the following components:    Glucose 163 (*)     All other components within normal limits   POCT GLUCOSE FINGERSTICK - Abnormal; Notable for the following components:    Glucose 142 (*)     All other components within normal limits   POCT GLUCOSE FINGERSTICK - Abnormal; Notable for the following components:    Glucose 201 (*)     All other components within normal limits   POCT GLUCOSE FINGERSTICK - Abnormal; Notable for the following components:    Glucose 191 (*)     All other components within normal limits   POCT GLUCOSE FINGERSTICK - Abnormal; Notable for the following components:    Glucose 180 (*)     All other components within normal limits   POCT GLUCOSE FINGERSTICK - Abnormal; Notable for the following components:    Glucose 304 (*)     All other components within normal limits   POCT GLUCOSE FINGERSTICK -  Abnormal; Notable for the following components:    Glucose 294 (*)     All other components within normal limits   POCT GLUCOSE FINGERSTICK - Abnormal; Notable for the following components:    Glucose 210 (*)     All other components within normal limits   POCT GLUCOSE FINGERSTICK - Abnormal; Notable for the following components:    Glucose 198 (*)     All other components within normal limits   POCT GLUCOSE FINGERSTICK - Abnormal; Notable for the following components:    Glucose 221 (*)     All other components within normal limits   POCT GLUCOSE FINGERSTICK - Abnormal; Notable for the following components:    Glucose 292 (*)     All other components within normal limits   POCT GLUCOSE FINGERSTICK - Abnormal; Notable for the following components:    Glucose 205 (*)     All other components within normal limits   RESPIRATORY PANEL PCR W/ COVID-19 (SARS-COV-2), NP SWAB IN UTM/VTP, 2 HR TAT - Normal    Narrative:     In the setting of a positive respiratory panel with a viral infection PLUS a negative procalcitonin without other underlying concern for bacterial infection, consider observing off antibiotics or discontinuation of antibiotics and continue supportive care. If the respiratory panel is positive for atypical bacterial infection (Bordetella pertussis, Chlamydophila pneumoniae, or Mycoplasma pneumoniae), consider antibiotic de-escalation to target atypical bacterial infection.   BLOOD CULTURE - Normal    Narrative:     Less than seven (7) mL's of blood was collected.  Insufficient quantity may yield false negative results.   BLOOD CULTURE - Normal   CLOSTRIDIOIDES DIFFICILE TOXIN AG (REFLEX ONLY) - Normal    Narrative:     DNA from a toxigenic strain of C.difficile was detected, although the free toxin itself was not detected. These findings are consistent with C.difficile colonization and may not reflect actual C.difficile infection. Clinical correlation needed.   MAGNESIUM - Normal   LACTIC ACID, REFLEX -  Normal   MAGNESIUM - Normal   MAGNESIUM - Normal   MAGNESIUM - Normal   PHOSPHORUS - Normal   POCT GLUCOSE FINGERSTICK - Normal   CLOSTRIDIOIDES DIFFICILE TOXIN    Narrative:     The following orders were created for panel order Clostridioides difficile Toxin - Stool, Per Rectum.  Procedure                               Abnormality         Status                     ---------                               -----------         ------                     Clostridioides difficile...[[id number]]  Abnormal            Final result                 Please view results for these tests on the individual orders.   ETHANOL   SODIUM, URINE, RANDOM    Narrative:     Reference intervals for random urine have not been established.  Clinical usage is dependent upon physician's interpretation in combination with other laboratory tests.      CREATININE URINE RANDOM (KIDNEY FUNCTION) GFR COMPONENT    Narrative:     Reference intervals for random urine have not been established.  Clinical usage is dependent upon physician's interpretation in combination with other laboratory tests.      POCT GLUCOSE FINGERSTICK   POCT GLUCOSE FINGERSTICK   POCT GLUCOSE FINGERSTICK   POCT GLUCOSE FINGERSTICK   POCT GLUCOSE FINGERSTICK   POCT GLUCOSE FINGERSTICK   POCT GLUCOSE FINGERSTICK   POCT GLUCOSE FINGERSTICK   POCT GLUCOSE FINGERSTICK   POCT GLUCOSE FINGERSTICK   POCT GLUCOSE FINGERSTICK   POCT GLUCOSE FINGERSTICK   POCT GLUCOSE FINGERSTICK   POCT GLUCOSE FINGERSTICK   POCT GLUCOSE FINGERSTICK   POCT GLUCOSE FINGERSTICK   POCT GLUCOSE FINGERSTICK   POCT GLUCOSE FINGERSTICK   POCT GLUCOSE FINGERSTICK   POCT GLUCOSE FINGERSTICK   CBC AND DIFFERENTIAL    Narrative:     The following orders were created for panel order CBC & Differential.  Procedure                               Abnormality         Status                     ---------                               -----------         ------                     CBC Auto Differential[[id number]]         Abnormal            Final result                 Please view results for these tests on the individual orders.   CBC AND DIFFERENTIAL    Narrative:     The following orders were created for panel order CBC & Differential.  Procedure                               Abnormality         Status                     ---------                               -----------         ------                     CBC Auto Differential[[id number]]        Abnormal            Final result                 Please view results for these tests on the individual orders.   CBC AND DIFFERENTIAL    Narrative:     The following orders were created for panel order CBC & Differential.  Procedure                               Abnormality         Status                     ---------                               -----------         ------                     CBC Auto Differential[[id number]]        Abnormal            Final result                 Please view results for these tests on the individual orders.       EKG:   SCANNED - TELEMETRY     Final Result      SCANNED - TELEMETRY     Final Result      SCANNED - TELEMETRY     Final Result      SCANNED - TELEMETRY     Final Result      SCANNED - TELEMETRY     Final Result      SCANNED - TELEMETRY     Final Result      SCANNED - TELEMETRY     Final Result      SCANNED - TELEMETRY     Final Result      SCANNED - TELEMETRY     Final Result      SCANNED - TELEMETRY     Final Result      SCANNED - TELEMETRY     Final Result      SCANNED - TELEMETRY     Final Result      SCANNED - TELEMETRY     Final Result      SCANNED - TELEMETRY     Final Result      SCANNED - TELEMETRY     Final Result      SCANNED - TELEMETRY     Final Result          Meds given in ED:   Medications   acetaminophen (TYLENOL) tablet 650 mg (650 mg Oral Given 1/28/24 2054)   ondansetron ODT (ZOFRAN-ODT) disintegrating tablet 4 mg ( Oral Not Given:  See Alt 1/31/24 0809)     Or   ondansetron (ZOFRAN) injection 4 mg (4 mg Intravenous Given  1/31/24 0809)   melatonin tablet 3 mg (has no administration in time range)   dextrose (GLUTOSE) oral gel 15 g (has no administration in time range)   dextrose (D50W) (25 g/50 mL) IV injection 25 g (has no administration in time range)   glucagon (GLUCAGEN) injection 1 mg (has no administration in time range)   insulin lispro (HUMALOG/ADMELOG) injection 2-7 Units (3 Units Subcutaneous Given 1/31/24 1709)   atorvastatin (LIPITOR) tablet 80 mg (80 mg Oral Given 1/30/24 2011)   bisoprolol (ZEBeta) tablet 5 mg (5 mg Oral Given 1/31/24 0804)   cadexomer iodine (IODOSORB) 0.9 % gel 1 application  (has no administration in time range)   levothyroxine (SYNTHROID, LEVOTHROID) tablet 112 mcg (112 mcg Oral Given 1/31/24 0610)   levothyroxine (SYNTHROID, LEVOTHROID) tablet 168 mcg (168 mcg Oral Not Given 1/28/24 0900)   multivitamin with minerals 1 tablet (1 tablet Oral Given 1/31/24 0804)   vitamin B-12 (CYANOCOBALAMIN) tablet 1,000 mcg (1,000 mcg Oral Given 1/31/24 0803)   pregabalin (LYRICA) capsule 50 mg (50 mg Oral Given 1/31/24 0804)   HYDROcodone-acetaminophen (NORCO)  MG per tablet 1 tablet (1 tablet Oral Given 1/31/24 1546)   Pharmacy Consult (has no administration in time range)   Pharmacy Consult (has no administration in time range)   famotidine (PEPCID) tablet 20 mg (20 mg Oral Given 1/31/24 0804)   Menthol-Zinc Oxide 1 Application (1 Application Topical Given 1/31/24 1711)   castor oil-balsam peru (VENELEX) ointment 1 Application (1 Application Topical Given 1/31/24 0812)   lactated ringers infusion (100 mL/hr Intravenous New Bag 1/31/24 1547)   fidaxomicin (DIFICID) tablet 200 mg (200 mg Oral Given 1/31/24 1220)   Potassium Replacement - Follow Nurse / BPA Driven Protocol (has no administration in time range)   sodium chloride 0.9 % bolus 1,000 mL (0 mL Intravenous Stopped 1/27/24 1911)   piperacillin-tazobactam (ZOSYN) 3.375 g in iso-osmotic dextrose 50 ml (premix) (0 g Intravenous Stopped 1/27/24 1911)    vancomycin 1750 mg/500 mL 0.9% NS IVPB ([facility]) (1,750 mg Intravenous New Bag 24)   sodium chloride 0.9 % bolus 1,000 mL (1,000 mL Intravenous New Bag 24)   potassium chloride (K-DUR,KLOR-CON) ER tablet 40 mEq (40 mEq Oral Given 24 1518)   potassium chloride (KLOR-CON) packet 40 mEq (40 mEq Oral Given 24 0658)   magnesium sulfate 2g/50 mL (PREMIX) infusion (2 g Intravenous New Bag 24 0919)   potassium chloride (K-DUR,KLOR-CON) ER tablet 40 mEq (40 mEq Oral Given 24 1133)       Imaging results:  No radiology results for the last day    Ambulatory status:   - Ax2    Social issues:   Social History     Socioeconomic History    Marital status:     Number of children: 3   Tobacco Use    Smoking status: Former     Packs/day: 1.00     Years: 40.00     Additional pack years: 0.00     Total pack years: 40.00     Types: Cigarettes     Quit date:      Years since quittin.0    Smokeless tobacco: Never   Vaping Use    Vaping Use: Never used   Substance and Sexual Activity    Alcohol use: No    Drug use: No    Sexual activity: Defer          NIH Stroke Scale:         Ziggy Rodriguez RN  24 18:12 EST

## 2024-01-31 NOTE — PROGRESS NOTES
[facility] Progress Note      Patient Name: Halie Guidry  : 1940  MRN: [id number]  Primary Care Physician:  Luis Felipe Flowers MD  Date of admission: 2024    Subjective     Interval History:   Diarrhea continues, with 7 episodes yesterday and at least 2 already today  IVF infusing  Saturating well on room air  Urinary retention, requiring periodic I/O catheterization    Review of Systems:   As noted above    Objective     Vitals:   Temp:  [97.9 °F (36.6 °C)-99.1 °F (37.3 °C)] 98.8 °F (37.1 °C)  Heart Rate:  [75-78] 75  Resp:  [16] 16  BP: (142-166)/(61-79) 150/61  No intake or output data in the 24 hours ending 24 1625      Physical Exam:    I did not examine the patient today to reduce exposure to COVID-19  I reviewed physical examinations performed today by other providers    Scheduled Meds:     atorvastatin, 80 mg, Oral, Nightly  bisoprolol, 5 mg, Oral, Daily  castor oil-balsam peru, 1 Application, Topical, Q12H  famotidine, 20 mg, Oral, Daily  fidaxomicin, 200 mg, Oral, Q12H  insulin lispro, 2-7 Units, Subcutaneous, 4x Daily AC & at Bedtime  levothyroxine, 112 mcg, Oral, Once per day on   levothyroxine, 168 mcg, Oral, Weekly  Menthol-Zinc Oxide, 1 Application, Topical, 4x Daily  multivitamin with minerals, 1 tablet, Oral, Daily  pregabalin, 50 mg, Oral, Daily  cyanocobalamin, 1,000 mcg, Oral, Daily      IV Meds:   lactated ringers, 100 mL/hr, Last Rate: 100 mL/hr (24 1547)  Pharmacy Consult,   Pharmacy Consult,         Results Reviewed:   I have personally reviewed the results from the time of this admission to 2024 16:25 EST     Results from last 7 days   Lab Units 24  0450 24  1501 24  0513 24  0504 24  0928 24  1615   SODIUM mmol/L 142  --  143 139   < > 137   POTASSIUM mmol/L 3.3* 3.4* 2.9* 3.1*   < > 4.5   CHLORIDE mmol/L 110*  --  110* 107   < > 104   CO2  mmol/L 18.6*  --  21.0* 15.3*   < > 14.0*   BUN mg/dL 34*  --  48* 61*   < > 68*   CREATININE mg/dL 1.41*  --  2.20* 3.12*   < > 4.99*   CALCIUM mg/dL 7.4*  --  7.5* 7.5*   < > 9.0   BILIRUBIN mg/dL 0.3  --  <0.2  --   --  0.3   ALK PHOS U/L 93  --  115  --   --  101   ALT (SGPT) U/L 7  --  11  --   --  13   AST (SGOT) U/L 19  --  18  --   --  23   GLUCOSE mg/dL 189*  --  85 117*   < > 173*    < > = values in this interval not displayed.       Estimated Creatinine Clearance: 36.6 mL/min (A) (by C-G formula based on SCr of 1.41 mg/dL (H)).    Results from last 7 days   Lab Units 01/31/24  0450 01/30/24  0513 01/29/24  0504   MAGNESIUM mg/dL 2.0 1.7 2.0   PHOSPHORUS mg/dL 2.7  --  4.8*             Results from last 7 days   Lab Units 01/31/24  0450 01/30/24  0513 01/29/24  0504 01/28/24  0928 01/27/24  1615   WBC 10*3/mm3 23.86* 23.88* 22.24* 20.38* 29.84*   HEMOGLOBIN g/dL 11.3* 10.2* 10.8* 10.4* 12.5   PLATELETS 10*3/mm3 327 311 338 304 417             Assessment / Plan     ASSESSMENT:  1.  VIPUL, nonoliguric, steadily improving:  prerenal due to volume depletion from diarrhea and poor appetite, with limited renal autoregulation due to lisinopril.  Low potassium in face of poor nutrition and diarrhea; likely NAGMA due to diarrhea and VIPUL.  Currently on LR.  2.  Toe ulcers both feet and sacral decubitus  3.  Recurrent C. difficile colitis  4.  DM2  5.  Anemia  6.  Recent COVID infection      PLAN:  1.  With improvement in renal function, begin potassium protocol  2.  Continue LR at current rate until diarrhea subsides  3.  Surveillance labs    Thank you for involving us in the care of Halie Guidry.  Please feel free to call with any questions.    Derrick Murry MD  01/31/24  16:25 [facility]    [facility]  [phone]    Please note that portions of this note were completed with a voice recognition program.

## 2024-01-31 NOTE — PHARMACY PATIENT ASSISTANCE
Patient was approved to receive Dificid at no charge through [employer]. Confirmation number is [id number]. Spoke with son, Derrick, regarding calling supplying pharmacy ([facility]) and confirming address. Son stated he will call them as soon as possible to coordinate delivery. Dr. Rodriguez notified of approval through patient assistance program.     Addendum: I called patient assistance program and was told to ensure expedited delivery of the medication, I would need to fax over the application to the pharmacy. I have done this. I have also verified address with the PAP. I have called son, Derrick, to inform him of this.     Ziggy Cortez, PharmD  Transitions of Care Pharmacist  
Spouse

## 2024-01-31 NOTE — PROGRESS NOTES
Name: Halie Guidry ADMIT: 2024   : 1940  PCP: Luis Felipe Flowers MD    MRN: [id number] LOS: 4 days   AGE/SEX: 83 y.o. female  ROOM: [facility]     Subjective   Subjective   Patient seen and examined this morning.  Hospital day 4.  She is awake and resting in bed, continues to have abdominal pain/cramping and diarrhea.       Objective   Objective   Vital Signs  Temp:  [97.9 °F (36.6 °C)-99.1 °F (37.3 °C)] 98.6 °F (37 °C)  Heart Rate:  [74-78] 78  Resp:  [16] 16  BP: (142-166)/(64-79) 166/64  SpO2:  [94 %-96 %] 94 %  on  Flow (L/min):  [1] 1;   Device (Oxygen Therapy): room air  Body mass index is 32.05 kg/m².  Physical Exam  Vitals and nursing note reviewed.   Constitutional:       General: She is not in acute distress.     Appearance: She is ill-appearing.   Cardiovascular:      Rate and Rhythm: Normal rate and regular rhythm.      Pulses: Normal pulses.      Heart sounds: Normal heart sounds.   Pulmonary:      Effort: Pulmonary effort is normal. No respiratory distress.      Breath sounds: Normal breath sounds. No wheezing.   Abdominal:      General: Bowel sounds are normal. There is no distension.      Palpations: Abdomen is soft.      Tenderness: There is abdominal tenderness.   Skin:     General: Skin is warm and dry.      Coloration: Skin is pale.   Neurological:      Mental Status: She is alert.       Results Review     I reviewed the patient's new clinical results.  Results from last 7 days   Lab Units 24  0450 24  0513 24  0504 24  0928   WBC 10*3/mm3 23.86* 23.88* 22.24* 20.38*   HEMOGLOBIN g/dL 11.3* 10.2* 10.8* 10.4*   PLATELETS 10*3/mm3 327 311 338 304     Results from last 7 days   Lab Units 24  0450 24  1501 24  0513 24  0504 24  0928   SODIUM mmol/L 142  --  143 139 139   POTASSIUM mmol/L 3.3* 3.4* 2.9* 3.1* 4.0   CHLORIDE mmol/L 110*  --  110* 107 110*   CO2 mmol/L 18.6*  --  21.0* 15.3* 13.3*   BUN mg/dL 34*  --  48* 61* 67*    CREATININE mg/dL 1.41*  --  2.20* 3.12* 4.05*   GLUCOSE mg/dL 189*  --  85 117* 158*   EGFR mL/min/1.73 37.1*  --  21.7* 14.3* 10.5*     Results from last 7 days   Lab Units 01/31/24  0450 01/30/24  0513 01/29/24  0504 01/27/24  1615   ALBUMIN g/dL 2.2* 2.1* 2.4* 3.1*   BILIRUBIN mg/dL 0.3 <0.2  --  0.3   ALK PHOS U/L 93 115  --  101   AST (SGOT) U/L 19 18  --  23   ALT (SGPT) U/L 7 11  --  13     Results from last 7 days   Lab Units 01/31/24  0450 01/30/24  0513 01/29/24  0504 01/28/24  0928 01/27/24  1615   CALCIUM mg/dL 7.4* 7.5* 7.5* 7.6* 9.0   ALBUMIN g/dL 2.2* 2.1* 2.4*  --  3.1*   MAGNESIUM mg/dL 2.0 1.7 2.0  --  2.1   PHOSPHORUS mg/dL 2.7  --  4.8*  --   --      Results from last 7 days   Lab Units 01/27/24  1818 01/27/24  1615   PROCALCITONIN ng/mL  --  0.79*   LACTATE mmol/L 1.6 2.4*     Glucose   Date/Time Value Ref Range Status   01/31/2024 0802 221 (H) 70 - 130 mg/dL Final   01/31/2024 0617 198 (H) 70 - 130 mg/dL Final   01/30/2024 2002 210 (H) 70 - 130 mg/dL Final   01/30/2024 1651 294 (H) 70 - 130 mg/dL Final   01/30/2024 1648 304 (H) 70 - 130 mg/dL Final   01/30/2024 1141 180 (H) 70 - 130 mg/dL Final   01/30/2024 0732 97 70 - 130 mg/dL Final       No radiology results for the last day    I have personally reviewed all medications:  Scheduled Medications  atorvastatin, 80 mg, Oral, Nightly  bisoprolol, 5 mg, Oral, Daily  castor oil-balsam peru, 1 Application, Topical, Q12H  famotidine, 20 mg, Oral, Daily  fidaxomicin, 200 mg, Oral, Q12H  insulin lispro, 2-7 Units, Subcutaneous, 4x Daily AC & at Bedtime  levothyroxine, 112 mcg, Oral, Once per day on Monday Tuesday Wednesday Thursday Friday Saturday  levothyroxine, 168 mcg, Oral, Weekly  Menthol-Zinc Oxide, 1 Application, Topical, 4x Daily  multivitamin with minerals, 1 tablet, Oral, Daily  pregabalin, 50 mg, Oral, Daily  cyanocobalamin, 1,000 mcg, Oral, Daily    Infusions  lactated ringers, 100 mL/hr, Last Rate: 100 mL/hr (01/31/24 0610)  Pharmacy  Consult,   Pharmacy Consult,     Diet  Diet: Cardiac Diets; Healthy Heart (2-3 Na+); Texture: Regular Texture (IDDSI 7); Fluid Consistency: Thin (IDDSI 0)    I have personally reviewed:  [x]  Laboratory   [x]  Microbiology   [x]  Radiology   [x]  EKG/Telemetry  [x]  Cardiology/Vascular   []  Pathology    []  Records       Assessment/Plan     Active Hospital Problems    Diagnosis  POA    **Sepsis [A41.9]  Yes    Stage 3a chronic kidney disease [N18.31]  Yes    Metabolic encephalopathy [G93.41]  Yes    Pressure injury of buttock, stage 2 [L89.302]  Yes    C. difficile diarrhea [A04.72]  Yes    Neuropathic pain [M79.2]  Yes    Acute UTI (urinary tract infection) [N39.0]  Yes    VIPUL (acute kidney injury) [N17.9]  Yes    Seizure [R56.9]  Yes    Hyperlipidemia [E78.5]  Yes    Type 2 diabetes mellitus with hyperglycemia, with long-term current use of insulin [E11.65, Z79.4]  Not Applicable    Benign essential hypertension [I10]  Yes      Resolved Hospital Problems   No resolved problems to display.       83 y.o. female admitted with Sepsis.    Sepsis  Recurrent C. Difficile colitis  - ID consulted and following. Patient has been on Vancomycin, but Dificid has now been approved, planning to stop Vancomycin and start this today (01/31/24), plan for 10 day course---after completion, ID recommending daily probiotic x 3 months. Discussed with CCP, she will not be able to take this medication at rehab, so will likely have to stay here to complete antibiotic course.   - Will continue to follow ID plans/recommendations. Greatly appreciate their help.  - Order repeat CBC in AM for reassessment of WBC count.    Acute kidney injury  - Etiology most likely pre-renal in nature, due to volume depletion, decreased PO intake, impaired autoregulation from Lisinopril. Nephrology following, creatinine peaked at 4.99, now improving with IVF.  - Continue IVF for volume resuscitation and closely monitor. Will continue to follow their  plans/recommendations. Greatly appreciate their help.  - Order repeat BMP in AM for reassessment.    Type 2 diabetes with hyperglycemia  - Most recent A1c: 8.20% (12/24/23).  - Current treatment regimen: SSI; Glucose uncontrolled.  - Add Glargine 7 units QHS. Continue SSI.  - Will monitor 24 hour insulin requirements and adjust as needed to achieve target inpatient range of 140-180.    Hypertension  - Blood pressure appears stable and acceptable acutely at this time. No indications to warrant acute changes/intervention at this time.  - Continue current medications as prescribed. Trend BP to guide ongoing management decisions.    Hypothyroidism  - Continue levothyroxine as prescribed.    Hypokalemia  - Noted on most recent labs, replete per protocol if okay with Nephrology.    Anemia  - Noted on most recent labs, continue to monitor, trend with daily CBC.      All workup, problems and plans new to me today. First day taking care of patient.    SCDs for DVT prophylaxis.  Limited code (no CPR, no intubation).  Discussed with patient, nursing staff, CCP, and care team on multidisciplinary rounds.  Anticipate discharge  TBD  timing yet to be determined.      Zachariah Ivory DO  [facility]  01/31/24

## 2024-01-31 NOTE — CASE MANAGEMENT/SOCIAL WORK
Continued Stay Note  [facility]     Patient Name: Halie Guidry  MRN: [id number]  Today's Date: 1/31/2024    Admit Date: 1/27/2024    Plan: Return to [facility] pending bed availability and pre cert. Plan is for Dificid for 10 days. Due to high cost of Dificid SNF will not accept while receiving. Plan is to complete Dificid in hospital then go to SNF.   Discharge Plan       Row Name 01/31/24 1827       Plan    Plan Return to [facility] pending bed availability and pre cert. Plan is for Dificid for 10 days. Due to high cost of Dificid SNF will not accept while receiving. Plan is to complete Dificid in hospital then go to SNF.    Patient/Family in Agreement with Plan yes    Plan Comments Plan is for Dificid for 10 days. Due to high cost of Dificid SNF will not accept while receiving. Plan is to complete Dificid in hospital then go to SNF. Palliative met with pt yesterday. Plan is to continue current treatment for now. Tobias Hicks RN-BC                   Discharge Codes    No documentation.                 Expected Discharge Date and Time       Expected Discharge Date Expected Discharge Time    Feb 5, 2024               Tobias Hicks RN

## 2024-01-31 NOTE — PLAN OF CARE
Goal Outcome Evaluation:   VSS; IVF continue; multiple loose BM's throughout the night; wound care done to coccyx;BS was 210; SR; PRN pain med given x1; will continue to monitor

## 2024-01-31 NOTE — PROGRESS NOTES
ID PROGRESS NOTE    CC: f/u C diff    Subj: She says her diarrhea is improved but it is not resolved.  No fever.    Medications:    Current Facility-Administered Medications:     acetaminophen (TYLENOL) tablet 650 mg, 650 mg, Oral, Q4H PRN, Kim Barnard MD, 650 mg at 01/28/24 2054    atorvastatin (LIPITOR) tablet 80 mg, 80 mg, Oral, Nightly, Kim Barnard MD, 80 mg at 01/30/24 2011    bisoprolol (ZEBeta) tablet 5 mg, 5 mg, Oral, Daily, Kim Barnard MD, 5 mg at 01/31/24 0804    cadexomer iodine (IODOSORB) 0.9 % gel 1 application , 1 application , Topical, Daily PRN, Kim Barnard MD    castor oil-balsam peru (VENELEX) ointment 1 Application, 1 Application, Topical, Q12H, Nolan Case MD, 1 Application at 01/31/24 0812    dextrose (D50W) (25 g/50 mL) IV injection 25 g, 25 g, Intravenous, Q15 Min PRN, Kim Barnard MD    dextrose (GLUTOSE) oral gel 15 g, 15 g, Oral, Q15 Min PRN, Kim Barnard MD    famotidine (PEPCID) tablet 20 mg, 20 mg, Oral, Daily, Nolan Case MD, 20 mg at 01/31/24 0804    fidaxomicin (DIFICID) tablet 200 mg, 200 mg, Oral, Q12H, Lei Rodriguez MD    glucagon (GLUCAGEN) injection 1 mg, 1 mg, Intramuscular, Q15 Min PRN, Kim Barnard MD    HYDROcodone-acetaminophen (NORCO)  MG per tablet 1 tablet, 1 tablet, Oral, Q6H PRN, Nolan Case MD, 1 tablet at 01/30/24 2017    insulin lispro (HUMALOG/ADMELOG) injection 2-7 Units, 2-7 Units, Subcutaneous, 4x Daily AC & at Bedtime, Kim Barnard MD, 2 Units at 01/31/24 0803    lactated ringers infusion, 100 mL/hr, Intravenous, Continuous, Derrick Murry MD, Last Rate: 100 mL/hr at 01/31/24 0610, 100 mL/hr at 01/31/24 0610    levothyroxine (SYNTHROID, LEVOTHROID) tablet 112 mcg, 112 mcg, Oral, Once per day on Monday Tuesday Wednesday Thursday Friday Saturday, StinglKim MD, 112 mcg at 01/31/24 0610    levothyroxine (SYNTHROID,  LEVOTHROID) tablet 168 mcg, 168 mcg, Oral, Weekly, Kim Barnard MD    melatonin tablet 3 mg, 3 mg, Oral, Nightly PRN, Kim Barnard MD    Menthol-Zinc Oxide 1 Application, 1 Application, Topical, 4x Daily, Nolan Case MD, 1 Application at 01/31/24 0812    multivitamin with minerals 1 tablet, 1 tablet, Oral, Daily, Kim Barnard MD, 1 tablet at 01/31/24 0804    ondansetron ODT (ZOFRAN-ODT) disintegrating tablet 4 mg, 4 mg, Oral, Q6H PRN **OR** ondansetron (ZOFRAN) injection 4 mg, 4 mg, Intravenous, Q6H PRN, Kim Barnard MD, 4 mg at 01/31/24 0809    Pharmacy Consult, , Does not apply, Continuous PRN, Nolan Case MD    Pharmacy Consult, , Does not apply, Continuous PRN, Nolan Case MD    pregabalin (LYRICA) capsule 50 mg, 50 mg, Oral, Daily, Kim Barnard MD, 50 mg at 01/31/24 0804    vitamin B-12 (CYANOCOBALAMIN) tablet 1,000 mcg, 1,000 mcg, Oral, Daily, Kim Barnard MD, 1,000 mcg at 01/31/24 0803      Objective   Vital Signs   Temp:  [97.9 °F (36.6 °C)-99.1 °F (37.3 °C)] 98.6 °F (37 °C)  Heart Rate:  [74-78] 78  Resp:  [16] 16  BP: (142-166)/(64-79) 166/64    Physical Exam:   General: Awake, alert, feeding herself breakfast  Eyes: no scleral icterus  Cardiovascular: NR  Respiratory: normal work of breathing; no wheezing  GI: Abdomen is nontender or distended  :  no Baig catheter  MSK: Foot is bandaged; no surrounding erythema  Skin: Chronic venous stasis changes  Neurological: Alert and oriented x 3  Psychiatric: Normal mood and affect     Labs:   CBC, CMP, and blood cultures reviewed today  Lab Results   Component Value Date    WBC 23.86 (H) 01/31/2024    HGB 11.3 (L) 01/31/2024    HCT 34.2 01/31/2024    MCV 85.3 01/31/2024     01/31/2024     Lab Results   Component Value Date    GLUCOSE 189 (H) 01/31/2024    BUN 34 (H) 01/31/2024    CREATININE 1.41 (H) 01/31/2024    EGFRIFNONA 51 (L) 02/28/2022    EGFRIFAFRI 50 (L)  01/18/2021    BCR 24.1 01/31/2024    CO2 18.6 (L) 01/31/2024    CALCIUM 7.4 (L) 01/31/2024    PROTENTOTREF 7.2 06/02/2023    ALBUMIN 2.2 (L) 01/31/2024    LABIL2 1.3 06/02/2023    AST 19 01/31/2024    ALT 7 01/31/2024     Lab Results   Component Value Date    HGBA1C 8.20 (H) 12/24/2023     Urinalysis with too numerous to count white blood cells, 3-6 squamous cells, large LE  Procalcitonin 0.79  Lactate 2.4      Microbiology:  1/27 RPP: Negative  1/27 blood cultures: Negative to date  1/28 C. difficile PCR positive but antigen negative    Prior radiology:  CT abdomen pelvis consistent with pancolitis    ASSESSMENT/PLAN:  Sepsis due to undetermined organism   recurrent C. difficile colitis  Acute kidney injury  CKD 3a  Recent COVID-19 infection-resolved  Metabolic acidosis  Confusion-possible polypharmacy  Uncontrolled type 2 diabetes    Fever curve improved and clinically she looks remarkably better.  Even though she was C. difficile PCR positive but antigen negative, I think she likely has C. difficile infection based on her symptoms, other labs, and her CT imaging findings.    Dificid has been approved.  Therefore stop vancomycin and start Dificid 200 mg p.o. every 12 hours x 10 days.    After she has finished Dificid, I recommend that she take a probiotic for the next 3 months.    Blood cultures are negative to date, and at this point I think it is unlikely they will turn positive.  I previously discontinued her ceftriaxone.    Thank you for allowing me to be involved in the care of this patient. Infectious diseases will sign off at this time with antibiotics plan in place, but please call me at [phone] if any further ID questions or new ID concerns.

## 2024-02-01 LAB
ALBUMIN SERPL-MCNC: 2 G/DL (ref 3.5–5.2)
ANION GAP SERPL CALCULATED.3IONS-SCNC: 10 MMOL/L (ref 5–15)
ANISOCYTOSIS BLD QL: ABNORMAL
BACTERIA SPEC AEROBE CULT: NORMAL
BACTERIA SPEC AEROBE CULT: NORMAL
BASOPHILS # BLD MANUAL: 0.23 10*3/MM3 (ref 0–0.2)
BASOPHILS NFR BLD MANUAL: 1 % (ref 0–1.5)
BUN SERPL-MCNC: 22 MG/DL (ref 8–23)
BUN/CREAT SERPL: 16.2 (ref 7–25)
CALCIUM SPEC-SCNC: 7.7 MG/DL (ref 8.6–10.5)
CHLORIDE SERPL-SCNC: 111 MMOL/L (ref 98–107)
CO2 SERPL-SCNC: 21 MMOL/L (ref 22–29)
CREAT SERPL-MCNC: 1.36 MG/DL (ref 0.57–1)
DEPRECATED RDW RBC AUTO: 47.2 FL (ref 37–54)
EGFRCR SERPLBLD CKD-EPI 2021: 38.7 ML/MIN/1.73
ELLIPTOCYTES BLD QL SMEAR: ABNORMAL
EOSINOPHIL # BLD MANUAL: 0.23 10*3/MM3 (ref 0–0.4)
EOSINOPHIL NFR BLD MANUAL: 1 % (ref 0.3–6.2)
ERYTHROCYTE [DISTWIDTH] IN BLOOD BY AUTOMATED COUNT: 15.7 % (ref 12.3–15.4)
GLUCOSE BLDC GLUCOMTR-MCNC: 219 MG/DL (ref 70–130)
GLUCOSE BLDC GLUCOMTR-MCNC: 239 MG/DL (ref 70–130)
GLUCOSE BLDC GLUCOMTR-MCNC: 244 MG/DL (ref 70–130)
GLUCOSE BLDC GLUCOMTR-MCNC: 247 MG/DL (ref 70–130)
GLUCOSE SERPL-MCNC: 224 MG/DL (ref 65–99)
HCT VFR BLD AUTO: 33.2 % (ref 34–46.6)
HGB BLD-MCNC: 10.4 G/DL (ref 12–15.9)
LYMPHOCYTES # BLD MANUAL: 0.72 10*3/MM3 (ref 0.7–3.1)
MAGNESIUM SERPL-MCNC: 1.6 MG/DL (ref 1.6–2.4)
MCH RBC QN AUTO: 26.3 PG (ref 26.6–33)
MCHC RBC AUTO-ENTMCNC: 31.3 G/DL (ref 31.5–35.7)
MCV RBC AUTO: 83.8 FL (ref 79–97)
MICROCYTES BLD QL: ABNORMAL
MYELOCYTES NFR BLD MANUAL: 2.1 % (ref 0–0)
NEUTROPHILS # BLD AUTO: 21.6 10*3/MM3 (ref 1.7–7)
NEUTROPHILS NFR BLD MANUAL: 92.7 % (ref 42.7–76)
NRBC BLD AUTO-RTO: 0.2 /100 WBC (ref 0–0.2)
OVALOCYTES BLD QL SMEAR: ABNORMAL
PHOSPHATE SERPL-MCNC: 2.6 MG/DL (ref 2.5–4.5)
PLAT MORPH BLD: NORMAL
PLATELET # BLD AUTO: 293 10*3/MM3 (ref 140–450)
PMV BLD AUTO: 9.5 FL (ref 6–12)
POIKILOCYTOSIS BLD QL SMEAR: ABNORMAL
POLYCHROMASIA BLD QL SMEAR: ABNORMAL
POTASSIUM SERPL-SCNC: 3.2 MMOL/L (ref 3.5–5.2)
POTASSIUM SERPL-SCNC: 3.9 MMOL/L (ref 3.5–5.2)
RBC # BLD AUTO: 3.96 10*6/MM3 (ref 3.77–5.28)
SODIUM SERPL-SCNC: 142 MMOL/L (ref 136–145)
SPHEROCYTES BLD QL SMEAR: ABNORMAL
VARIANT LYMPHS NFR BLD MANUAL: 3.1 % (ref 19.6–45.3)
WBC MORPH BLD: NORMAL
WBC NRBC COR # BLD AUTO: 23.3 10*3/MM3 (ref 3.4–10.8)

## 2024-02-01 PROCEDURE — 84132 ASSAY OF SERUM POTASSIUM: CPT | Performed by: STUDENT IN AN ORGANIZED HEALTH CARE EDUCATION/TRAINING PROGRAM

## 2024-02-01 PROCEDURE — 97530 THERAPEUTIC ACTIVITIES: CPT

## 2024-02-01 PROCEDURE — 25010000002 MAGNESIUM SULFATE 2 GM/50ML SOLUTION: Performed by: INTERNAL MEDICINE

## 2024-02-01 PROCEDURE — 63710000001 INSULIN LISPRO (HUMAN) PER 5 UNITS: Performed by: INTERNAL MEDICINE

## 2024-02-01 PROCEDURE — 63710000001 INSULIN GLARGINE PER 5 UNITS: Performed by: STUDENT IN AN ORGANIZED HEALTH CARE EDUCATION/TRAINING PROGRAM

## 2024-02-01 PROCEDURE — 63710000001 INSULIN LISPRO (HUMAN) PER 5 UNITS: Performed by: STUDENT IN AN ORGANIZED HEALTH CARE EDUCATION/TRAINING PROGRAM

## 2024-02-01 PROCEDURE — 83735 ASSAY OF MAGNESIUM: CPT | Performed by: INTERNAL MEDICINE

## 2024-02-01 PROCEDURE — 80069 RENAL FUNCTION PANEL: CPT | Performed by: INTERNAL MEDICINE

## 2024-02-01 PROCEDURE — 82948 REAGENT STRIP/BLOOD GLUCOSE: CPT

## 2024-02-01 PROCEDURE — 25810000003 LACTATED RINGERS PER 1000 ML: Performed by: INTERNAL MEDICINE

## 2024-02-01 PROCEDURE — 85007 BL SMEAR W/DIFF WBC COUNT: CPT | Performed by: STUDENT IN AN ORGANIZED HEALTH CARE EDUCATION/TRAINING PROGRAM

## 2024-02-01 PROCEDURE — 85025 COMPLETE CBC W/AUTO DIFF WBC: CPT | Performed by: STUDENT IN AN ORGANIZED HEALTH CARE EDUCATION/TRAINING PROGRAM

## 2024-02-01 RX ORDER — MAGNESIUM SULFATE HEPTAHYDRATE 40 MG/ML
2 INJECTION, SOLUTION INTRAVENOUS ONCE
Status: COMPLETED | OUTPATIENT
Start: 2024-02-01 | End: 2024-02-01

## 2024-02-01 RX ORDER — POTASSIUM CHLORIDE 750 MG/1
40 TABLET, FILM COATED, EXTENDED RELEASE ORAL EVERY 4 HOURS
Status: COMPLETED | OUTPATIENT
Start: 2024-02-01 | End: 2024-02-01

## 2024-02-01 RX ORDER — INSULIN LISPRO 100 [IU]/ML
2 INJECTION, SOLUTION INTRAVENOUS; SUBCUTANEOUS
Status: DISCONTINUED | OUTPATIENT
Start: 2024-02-01 | End: 2024-02-02

## 2024-02-01 RX ADMIN — LEVOTHYROXINE SODIUM 112 MCG: 112 TABLET ORAL at 06:05

## 2024-02-01 RX ADMIN — ANORECTAL OINTMENT 1 APPLICATION: 15.7; .44; 24; 20.6 OINTMENT TOPICAL at 12:38

## 2024-02-01 RX ADMIN — MAGNESIUM SULFATE HEPTAHYDRATE 2 G: 40 INJECTION, SOLUTION INTRAVENOUS at 18:29

## 2024-02-01 RX ADMIN — INSULIN LISPRO 3 UNITS: 100 INJECTION, SOLUTION INTRAVENOUS; SUBCUTANEOUS at 12:38

## 2024-02-01 RX ADMIN — POTASSIUM CHLORIDE 40 MEQ: 750 TABLET, EXTENDED RELEASE ORAL at 06:05

## 2024-02-01 RX ADMIN — FIDAXOMICIN 200 MG: 200 TABLET, FILM COATED ORAL at 22:00

## 2024-02-01 RX ADMIN — HYDROCODONE BITARTRATE AND ACETAMINOPHEN 1 TABLET: 10; 325 TABLET ORAL at 09:14

## 2024-02-01 RX ADMIN — INSULIN LISPRO 3 UNITS: 100 INJECTION, SOLUTION INTRAVENOUS; SUBCUTANEOUS at 22:01

## 2024-02-01 RX ADMIN — FAMOTIDINE 20 MG: 20 TABLET, FILM COATED ORAL at 09:04

## 2024-02-01 RX ADMIN — HYDROCODONE BITARTRATE AND ACETAMINOPHEN 1 TABLET: 10; 325 TABLET ORAL at 17:25

## 2024-02-01 RX ADMIN — INSULIN LISPRO 2 UNITS: 100 INJECTION, SOLUTION INTRAVENOUS; SUBCUTANEOUS at 12:38

## 2024-02-01 RX ADMIN — HYDROCODONE BITARTRATE AND ACETAMINOPHEN 1 TABLET: 10; 325 TABLET ORAL at 23:31

## 2024-02-01 RX ADMIN — INSULIN GLARGINE 12 UNITS: 100 INJECTION, SOLUTION SUBCUTANEOUS at 22:01

## 2024-02-01 RX ADMIN — INSULIN LISPRO 3 UNITS: 100 INJECTION, SOLUTION INTRAVENOUS; SUBCUTANEOUS at 09:06

## 2024-02-01 RX ADMIN — CASTOR OIL AND BALSAM, PERU 1 APPLICATION: 788; 87 OINTMENT TOPICAL at 09:07

## 2024-02-01 RX ADMIN — ANORECTAL OINTMENT 1 APPLICATION: 15.7; .44; 24; 20.6 OINTMENT TOPICAL at 22:01

## 2024-02-01 RX ADMIN — Medication 1 TABLET: at 09:04

## 2024-02-01 RX ADMIN — INSULIN LISPRO 3 UNITS: 100 INJECTION, SOLUTION INTRAVENOUS; SUBCUTANEOUS at 18:28

## 2024-02-01 RX ADMIN — FIDAXOMICIN 200 MG: 200 TABLET, FILM COATED ORAL at 09:04

## 2024-02-01 RX ADMIN — CASTOR OIL AND BALSAM, PERU 1 APPLICATION: 788; 87 OINTMENT TOPICAL at 22:00

## 2024-02-01 RX ADMIN — INSULIN LISPRO 2 UNITS: 100 INJECTION, SOLUTION INTRAVENOUS; SUBCUTANEOUS at 18:28

## 2024-02-01 RX ADMIN — BISOPROLOL FUMARATE 5 MG: 5 TABLET, FILM COATED ORAL at 09:04

## 2024-02-01 RX ADMIN — ANORECTAL OINTMENT 1 APPLICATION: 15.7; .44; 24; 20.6 OINTMENT TOPICAL at 17:27

## 2024-02-01 RX ADMIN — Medication 1000 MCG: at 09:04

## 2024-02-01 RX ADMIN — PREGABALIN 50 MG: 50 CAPSULE ORAL at 09:04

## 2024-02-01 RX ADMIN — ATORVASTATIN CALCIUM 80 MG: 80 TABLET, FILM COATED ORAL at 22:00

## 2024-02-01 RX ADMIN — ANORECTAL OINTMENT 1 APPLICATION: 15.7; .44; 24; 20.6 OINTMENT TOPICAL at 09:07

## 2024-02-01 RX ADMIN — POTASSIUM CHLORIDE 40 MEQ: 750 TABLET, EXTENDED RELEASE ORAL at 09:04

## 2024-02-01 RX ADMIN — SODIUM CHLORIDE, POTASSIUM CHLORIDE, SODIUM LACTATE AND CALCIUM CHLORIDE 100 ML/HR: 600; 310; 30; 20 INJECTION, SOLUTION INTRAVENOUS at 09:31

## 2024-02-01 NOTE — THERAPY TREATMENT NOTE
Patient Name: Halie Guidry  : 1940    MRN: [id number]                              Today's Date: 2024       Admit Date: 2024    Visit Dx:     ICD-10-CM ICD-9-CM   1. Sepsis with acute renal failure, due to unspecified organism, unspecified acute renal failure type, unspecified whether septic shock present  A41.9 038.9    R65.20 995.92    N17.9 584.9   2. Encephalopathy  G93.40 348.30     Patient Active Problem List   Diagnosis    Compression fracture    Lumbar degenerative disc disease    Type 2 diabetes mellitus with hyperglycemia, with long-term current use of insulin    Hyperlipidemia    Benign essential hypertension    Primary hypothyroidism    Neuropathy    Osteoporosis    Proteinuria    Tobacco abuse    Diabetic eye exam    Generalized weakness    Spinal stenosis    Scoliosis    Arthritis    VBI (vertebrobasilar insufficiency)    Orthostatic hypotension    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    Severe hypothyroidism    Noncompliance with medication regimen    Vitamin D deficiency disease    Altered mental status    Tremor    Seizure    Stage 3a chronic kidney disease    Thoracic degenerative disc disease    Metabolic encephalopathy    VIPUL (acute kidney injury)    Hyperglycemia    Type II diabetes mellitus, uncontrolled    Lower abdominal pain    Transaminitis    UTI (urinary tract infection)    Emphysematous cystitis    Choledocholithiasis    Hypokalemia    Hypoxia    Generalized abdominal pain    History of Clostridium difficile infection    History of ERCP    Acute UTI (urinary tract infection)    Hypomagnesemia    Burning pain    Anxiety disorder    Neuropathic pain    Intertrigo    Weakness of both lower extremities    Accelerated hypertension    Acute cystitis without hematuria    Altered mental status, unspecified altered mental status type    Left lower lobe pneumonia    Acute pain of left foot    Cellulitis and abscess of left lower extremity    Hypertensive  kidney disease with stage 3a chronic kidney disease    Bilateral leg pain    Peripheral edema    Primary malignant neuroendocrine tumor of pancreas    Encounter for long-term (current) use of high-risk medication    Diabetic foot ulcer    Acute renal insufficiency    C. difficile diarrhea    Sepsis    Stage 3a chronic kidney disease    Metabolic encephalopathy    Pressure injury of buttock, stage 2     Past Medical History:   Diagnosis Date    Acute metabolic encephalopathy 6/24/2022    Anxiety     Arthritis     Benign essential hypertension 08/20/2014    Bleeding disorder     Depression     Diabetes     Diabetes mellitus, type 2     Disc degeneration, lumbar     Headache, tension-type     Hyperlipidemia     Hypothyroidism     Neuropathy     Osteoporosis 09/09/2015    Peripheral neuropathy     Rotator cuff tear, left     Scoliosis     Shoulder pain     LEFT, TORN ROTATOR CUFF S/P FALL    Spinal stenosis      Past Surgical History:   Procedure Laterality Date    APPENDECTOMY      BILATERAL BREAST REDUCTION Bilateral 08/2015    CATARACT EXTRACTION  03/2015    CHOLECYSTECTOMY WITH INTRAOPERATIVE CHOLANGIOGRAM N/A 3/27/2022    Procedure: CHOLECYSTECTOMY LAPAROSCOPIC INTRAOPERATIVE CHOLANGIOGRAM;  Surgeon: Aiyana Hill MD;  Location: [facility];  Service: General;  Laterality: N/A;    COLONOSCOPY  06/05/2015    WNL    ERCP N/A 2/25/2022    Procedure: ENDOSCOPIC RETROGRADE CHOLANGIOPANCREATOGRAPHY with sphincterotomy and balloon sweep;  Surgeon: Chilo Wilhelm MD;  Location: [facility];  Service: Gastroenterology;  Laterality: N/A;  PRE/POST - CBD stones    ERCP N/A 3/28/2022    Procedure: ENDOSCOPIC RETROGRADE CHOLANGIOPANCREATOGRAPHY WITH SPHINCTEROTOMY AND BALLOON SWEEP;  Surgeon: Chilo Wilhelm MD;  Location: [facility];  Service: Gastroenterology;  Laterality: N/A;  PRE: COMMON DUCT STONE  POST: COMMON DUCT STONE    KYPHOPLASTY      REDUCTION MAMMAPLASTY      TONSILLECTOMY         General Information       Row Name 02/01/24 1546          OT Time and Intention    Document Type therapy note (daily note)  -SELAM     Mode of Treatment occupational therapy;physical therapy;co-treatment  -SELAM       Row Name 02/01/24 1546          General Information    Patient Profile Reviewed yes  -SELAM     Existing Precautions/Restrictions fall  -SELAM       Row Name 02/01/24 1546          Cognition    Orientation Status (Cognition) oriented x 3  -SELAM       Row Name 02/01/24 1546          Safety Issues, Functional Mobility    Impairments Affecting Function (Mobility) balance;endurance/activity tolerance;strength;pain;range of motion (ROM)  -SELAM     Comment, Safety Issues/Impairments (Mobility) PT/OT cotreatment medically appropriate and necessary due to patient acuity level, to maximize therapeutic benefit due to impaired act tolerance, and for safety of patient and staff. Treatment focused on progression of care and goals established in POC. Gait belt and non skid socks worn.  -SELAM               User Key  (r) = Recorded By, (t) = Taken By, (c) = Cosigned By      Initials Name Provider Type    SELAM Radha So OT Occupational Therapist                     Mobility/ADL's       Row Name 02/01/24 1547          Bed Mobility    Bed Mobility supine-sit  -SELAM     Supine-Sit [place] (Bed Mobility) contact guard;verbal cues;nonverbal cues (demo/gesture)  -SELAM     Assistive Device (Bed Mobility) bed rails;head of bed elevated  -SELAM     Comment, (Bed Mobility) Increased time with cues for seq and rest breaks  -SELAM       Row Name 02/01/24 1547          Transfers    Transfers bed-chair transfer;sit-stand transfer  -SELAM       Row Name 02/01/24 1547          Bed-Chair Transfer    Bed-Chair [place] (Transfers) minimum assist (75% patient effort);verbal cues;nonverbal cues (demo/gesture);2 person assist  -SELAM     Assistive Device (Bed-Chair Transfers) walker, front-wheeled  -SELAM     Comment, (Bed-Chair Transfer) cues for seq and posture   -SELAM       Row Name 02/01/24 1547          Sit-Stand Transfer    Sit-Stand [place] (Transfers) moderate assist (50% patient effort);maximum assist (25% patient effort);verbal cues;nonverbal cues (demo/gesture);2 person assist  -SELAM     Assistive Device (Sit-Stand Transfers) walker, front-wheeled  -SELAM     Comment, (Sit-Stand Transfer) Cues for seq and hand placement  -SELAM       Row Name 02/01/24 1547          Functional Mobility    Functional Mobility- Ind. Level moderate assist (50% patient effort);minimum assist (75% patient effort);2 person assist required  -SELAM     Functional Mobility- Device walker, front-wheeled  -SELAM     Functional Mobility- Safety Issues sequencing ability decreased  -SELAM     Functional Mobility- Comment Few steps to chair with cues for seq and posture  -SELAM       Row Name 02/01/24 1547          Activities of Daily Living    BADL Assessment/Intervention lower body dressing  -SELAM       [facility] Name 02/01/24 1547          Lower Body Dressing Assessment/Training    [place] Level (Lower Body Dressing) lower body dressing skills;don;socks;dependent (less than 25% patient effort)  -SELAM     Position (Lower Body Dressing) sitting up in bed  -SELAM               User Key  (r) = Recorded By, (t) = Taken By, (c) = Cosigned By      Initials Name Provider Type    Radha Soni OT Occupational Therapist                   Obj/Interventions       [facility] Name 02/01/24 1551          Shoulder (Therapeutic Exercise)    Shoulder (Therapeutic Exercise) AROM (active range of motion);AAROM (active assistive range of motion)  -SELAM     Shoulder AROM (Therapeutic Exercise) right;external rotation;internal rotation;horizontal aBduction/aDduction;10 repetitions;sitting  -SELAM     Shoulder AAROM (Therapeutic Exercise) right assist left;external rotation;internal rotation;horizontal aBduction/aDduction;sitting;10 repetitions  -SELAM       Row Name 02/01/24 1551          Elbow/Forearm (Therapeutic Exercise)    Elbow/Forearm  (Therapeutic Exercise) AROM (active range of motion)  -SELAM     Elbow/Forearm AROM (Therapeutic Exercise) bilateral;extension;flexion;sitting;15 repititions;2 sets  -SELAM       Row Name 02/01/24 1551          Motor Skills    Motor Skills functional endurance  -SELAM     Functional Endurance Fair; fatigues quickly  -SELAM     Therapeutic Exercise elbow/forearm;shoulder  -SELAM       Row Name 02/01/24 1551          Balance    Balance Assessment sitting static balance;sitting dynamic balance;standing static balance;standing dynamic balance  -SELAM     Static Sitting Balance standby assist  -SELAM     Dynamic Sitting Balance minimal assist;verbal cues;non-verbal cues (demo/gesture)  -SELAM     Position, Sitting Balance supported;unsupported;sitting edge of bed  -SELAM     Static Standing Balance minimal assist;contact guard;verbal cues;2-person assist  -SELAM     Dynamic Standing Balance 2-person assist;minimal assist;moderate assist;verbal cues;non-verbal cues (demo/gesture)  -SELAM     Position/Device Used, Standing Balance walker, front-wheeled  -SELAM     Balance Interventions sitting;standing;supported;occupation based/functional task  -SELAM     Comment, Balance No overt LOB noted  -SELAM               User Key  (r) = Recorded By, (t) = Taken By, (c) = Cosigned By      Initials Name Provider Type    SELAM Radha So OT Occupational Therapist                   Goals/Plan    No documentation.                  Clinical Impression       Row Name 02/01/24 1552          Pain Assessment    Pretreatment Pain Rating 4/10  -SELAM     Posttreatment Pain Rating 4/10  -SELAM     Pain Location - Side/Orientation Bilateral  -SELAM     Pain Location lower  -SELAM     Pain Location - extremity  -SELAM     Pain Intervention(s) Repositioned;Ambulation/increased activity;Rest  -SELAM       Row Name 02/01/24 1559          Plan of Care Review    Plan of Care Reviewed With patient  -SELAM     Progress improving  -SELAM     Outcome Evaluation Pt seen for OT/PT co-tx this date and tolerated well. Pt  CGA bed mobility to sit EOB for BUE ther ex AROM and AAROM and balance act. Pt fatigues quickly but improved act suzanne this PM. Pt STS MOD/MAX A x2 with FWWx and txfer few steps to chair MIN A x2. Pt [facility] on departure. OT will continue to progress pt as suzanne. Anticipates return to [facility] at [place].  -SELAM       Row Name 02/01/24 1555          Therapy Plan Review/Discharge Plan (OT)    Anticipated Discharge Disposition (OT) skilled nursing facility;extended care facility  -SELAM       Row Name 02/01/24 1554          Vital Signs    O2 Delivery Pre Treatment room air  -SELAM     O2 Delivery Intra Treatment room air  -SELAM     O2 Delivery Post Treatment room air  -SELAM       Row Name 02/01/24 1554          Positioning and Restraints    Pre-Treatment Position in bed  -SELAM     Post Treatment Position chair  -SELAM     In Chair notified nsg;reclined;call light within reach;encouraged to call for assist;exit alarm on;legs elevated  -SELAM               User Key  (r) = Recorded By, (t) = Taken By, (c) = Cosigned By      Initials Name Provider Type    Radha Soni, OT Occupational Therapist                   Outcome Measures       Row Name 02/01/24 1678          How much help from another is currently needed...    Putting on and taking off regular lower body clothing? 1  -SELAM     Bathing (including washing, rinsing, and drying) 2  -SELAM     Toileting (which includes using toilet bed pan or urinal) 1  -SELAM     Putting on and taking off regular upper body clothing 2  -SELAM     Taking care of personal grooming (such as brushing teeth) 3  -SELAM     Eating meals 3  -SELAM     AM-PAC 6 Clicks Score (OT) 12  -SELAM       Row Name 02/01/24 4604          How much help from another person do you currently need...    Turning from your back to your side while in flat bed without using bedrails? 2  -PH     Moving from lying on back to sitting on the side of a flat bed without bedrails? 2  -PH     Moving to and from a bed to a chair (including a wheelchair)? 2  -PH     Standing  up from a chair using your arms (e.g., wheelchair, bedside chair)? 2  -PH     Climbing 3-5 steps with a railing? 1  -PH     To walk in hospital room? 1  -PH     AM-PAC 6 Clicks Score (PT) 10  -PH     Highest Level of Mobility Goal 4 --> Transfer to chair/commode  -PH       Row Name 02/01/24 1557 02/01/24 1423       Functional Assessment    Outcome Measure Options AM-PAC 6 Clicks Daily Activity (OT)  -SELAM AM-PAC 6 Clicks Basic Mobility (PT)  -PH              User Key  (r) = Recorded By, (t) = Taken By, (c) = Cosigned By      Initials Name Provider Type    PH Joana Pimentel PTA Physical Therapist Assistant    Radha Soni OT Occupational Therapist                    Occupational Therapy Education       Title: PT OT SLP Therapies (Done)       Topic: Occupational Therapy (Done)       Point: ADL training (Done)       Description:   Instruct learner(s) on proper safety adaptation and remediation techniques during self care or transfers.   Instruct in proper use of assistive devices.                  Learning Progress Summary             Patient Acceptance, E, VU by SELAM at 1/29/2024 1702    Comment: Role of OT and safety awareness                         Point: Home exercise program (Done)       Description:   Instruct learner(s) on appropriate technique for monitoring, assisting and/or progressing therapeutic exercises/activities.                  Learning Progress Summary             Patient Acceptance, E, VU by SELAM at 1/29/2024 1702    Comment: Role of OT and safety awareness                         Point: Precautions (Done)       Description:   Instruct learner(s) on prescribed precautions during self-care and functional transfers.                  Learning Progress Summary             Patient Acceptance, E, VU by SELAM at 1/29/2024 1702    Comment: Role of OT and safety awareness                         Point: Body mechanics (Done)       Description:   Instruct learner(s) on proper positioning and spine  alignment during self-care, functional mobility activities and/or exercises.                  Learning Progress Summary             Patient Acceptance, HALEY, VU by SELAM at 1/29/2024 1702    Comment: Role of OT and safety awareness                                         User Key       Initials Effective Dates Name Provider Type Discipline    SELAM 10/12/23 -  Radha So, OT Occupational Therapist OT                  OT Recommendation and Plan  Planned Therapy Interventions (OT): activity tolerance training, BADL retraining, functional balance retraining, occupation/activity based interventions, patient/caregiver education/training, strengthening exercise, transfer/mobility retraining, adaptive equipment training, passive ROM/stretching, ROM/therapeutic exercise  Therapy Frequency (OT): 3 times/wk  Plan of Care Review  Plan of Care Reviewed With: patient  Progress: improving  Outcome Evaluation: Pt seen for OT/PT co-tx this date and tolerated well. Pt CGA bed mobility to sit EOB for BUE ther ex AROM and AAROM and balance act. Pt fatigues quickly but improved act suzanne this PM. Pt STS MOD/MAX A x2 with FWWx and txfer few steps to chair MIN A x2. Pt [facility] on departure. OT will continue to progress pt as suzanne. Anticipates return to prison at [place].     Time Calculation:   Evaluation Complexity (OT)  Review Occupational Profile/Medical/Therapy History Complexity: brief/low complexity  Assessment, Occupational Performance/Identification of Deficit Complexity: 1-3 performance deficits  Clinical Decision Making Complexity (OT): problem focused assessment/low complexity  Overall Complexity of Evaluation (OT): low complexity     Time Calculation- OT       Row Name 02/01/24 1558             Time Calculation- OT    OT Start Time 1349  -SELAM      OT Stop Time 1415  -SELAM      OT Time Calculation (min) 26 min  -SELAM      Total Timed Code Minutes- OT 26 minute(s)  -SELAM      OT Received On 02/01/24  -SELAM      OT - Next Appointment 02/02/24  -SELAM          Timed Charges    66739 - OT Therapeutic Activity Minutes 26  -SELAM         Total Minutes    Timed Charges Total Minutes 26  -SELAM       Total Minutes 26  -SELAM                User Key  (r) = Recorded By, (t) = Taken By, (c) = Cosigned By      Initials Name Provider Type    Radha Soni OT Occupational Therapist                  Therapy Charges for Today       Code Description Service Date Service Provider Modifiers Qty    [id number]  OT THERAPEUTIC ACT EA 15 MIN 2/1/2024 Radha So OT GO 2                 Radha So OT  2/1/2024

## 2024-02-01 NOTE — PROGRESS NOTES
Name: Halie Guidry ADMIT: 2024   : 1940  PCP: Luis Felipe Flowers MD    MRN: [id number] LOS: 5 days   AGE/SEX: 83 y.o. female  ROOM: [facility]     Subjective   Subjective   Patient seen and examined this morning.  Hospital day 5.  She is awake and resting in bed, continues to have abdominal pain and cramping, however, diarrhea is improving.       Objective   Objective   Vital Signs  Temp:  [97.7 °F (36.5 °C)-98.8 °F (37.1 °C)] 98.6 °F (37 °C)  Heart Rate:  [72-75] 72  Resp:  [16-18] 18  BP: (146-165)/(57-63) 146/57  SpO2:  [91 %-94 %] 94 %  on  Flow (L/min):  [1] 1;   Device (Oxygen Therapy): room air  Body mass index is 33.25 kg/m².  Physical Exam  Vitals and nursing note reviewed.   Constitutional:       General: She is not in acute distress.     Appearance: She is ill-appearing.   Cardiovascular:      Rate and Rhythm: Normal rate and regular rhythm.      Pulses: Normal pulses.      Heart sounds: Normal heart sounds.   Pulmonary:      Effort: Pulmonary effort is normal. No respiratory distress.      Breath sounds: Normal breath sounds.   Abdominal:      General: Bowel sounds are normal. There is no distension.      Palpations: Abdomen is soft.      Tenderness: There is abdominal tenderness. There is no guarding or rebound.   Skin:     General: Skin is warm and dry.      Coloration: Skin is pale.   Neurological:      Mental Status: She is alert.       Results Review     I reviewed the patient's new clinical results.  Results from last 7 days   Lab Units 24  04224  0450 24  0513 24  0504   WBC 10*3/mm3 23.30* 23.86* 23.88* 22.24*   HEMOGLOBIN g/dL 10.4* 11.3* 10.2* 10.8*   PLATELETS 10*3/mm3 293 327 311 338     Results from last 7 days   Lab Units 24  0421 24  0450 24  1501 24  0513 24  0504   SODIUM mmol/L 142 142  --  143 139   POTASSIUM mmol/L 3.2* 3.3* 3.4* 2.9* 3.1*   CHLORIDE mmol/L 111* 110*  --  110* 107   CO2 mmol/L 21.0* 18.6*  --   21.0* 15.3*   BUN mg/dL 22 34*  --  48* 61*   CREATININE mg/dL 1.36* 1.41*  --  2.20* 3.12*   GLUCOSE mg/dL 224* 189*  --  85 117*   EGFR mL/min/1.73 38.7* 37.1*  --  21.7* 14.3*     Results from last 7 days   Lab Units 02/01/24  0421 01/31/24  0450 01/30/24  0513 01/29/24  0504 01/27/24  1615   ALBUMIN g/dL 2.0* 2.2* 2.1* 2.4* 3.1*   BILIRUBIN mg/dL  --  0.3 <0.2  --  0.3   ALK PHOS U/L  --  93 115  --  101   AST (SGOT) U/L  --  19 18  --  23   ALT (SGPT) U/L  --  7 11  --  13     Results from last 7 days   Lab Units 02/01/24 0421 01/31/24 0450 01/30/24  0513 01/29/24  0504   CALCIUM mg/dL 7.7* 7.4* 7.5* 7.5*   ALBUMIN g/dL 2.0* 2.2* 2.1* 2.4*   MAGNESIUM mg/dL 1.6 2.0 1.7 2.0   PHOSPHORUS mg/dL 2.6 2.7  --  4.8*     Results from last 7 days   Lab Units 01/27/24  1818 01/27/24  1615   PROCALCITONIN ng/mL  --  0.79*   LACTATE mmol/L 1.6 2.4*     Glucose   Date/Time Value Ref Range Status   02/01/2024 0836 244 (H) 70 - 130 mg/dL Final   01/31/2024 2114 178 (H) 70 - 130 mg/dL Final   01/31/2024 2037 149 (H) 70 - 130 mg/dL Final   01/31/2024 1705 205 (H) 70 - 130 mg/dL Final   01/31/2024 1203 292 (H) 70 - 130 mg/dL Final   01/31/2024 0802 221 (H) 70 - 130 mg/dL Final   01/31/2024 0617 198 (H) 70 - 130 mg/dL Final       No radiology results for the last day    I have personally reviewed all medications:  Scheduled Medications  atorvastatin, 80 mg, Oral, Nightly  bisoprolol, 5 mg, Oral, Daily  castor oil-balsam peru, 1 Application, Topical, Q12H  famotidine, 20 mg, Oral, Daily  fidaxomicin, 200 mg, Oral, Q12H  insulin glargine, 7 Units, Subcutaneous, Nightly  insulin lispro, 2-7 Units, Subcutaneous, 4x Daily AC & at Bedtime  levothyroxine, 112 mcg, Oral, Once per day on Monday Tuesday Wednesday Thursday Friday Saturday  levothyroxine, 168 mcg, Oral, Weekly  Menthol-Zinc Oxide, 1 Application, Topical, 4x Daily  multivitamin with minerals, 1 tablet, Oral, Daily  pregabalin, 50 mg, Oral, Daily  cyanocobalamin, 1,000 mcg,  Oral, Daily    Infusions  Pharmacy Consult,   Pharmacy Consult,     Diet  Diet: Cardiac Diets; Healthy Heart (2-3 Na+); Texture: Regular Texture (IDDSI 7); Fluid Consistency: Thin (IDDSI 0)    I have personally reviewed:  [x]  Laboratory   [x]  Microbiology   [x]  Radiology   [x]  EKG/Telemetry  [x]  Cardiology/Vascular   []  Pathology    []  Records       Assessment/Plan     Active Hospital Problems    Diagnosis  POA    **Sepsis [A41.9]  Yes    Stage 3a chronic kidney disease [N18.31]  Yes    Metabolic encephalopathy [G93.41]  Yes    Pressure injury of buttock, stage 2 [L89.302]  Yes    C. difficile diarrhea [A04.72]  Yes    Neuropathic pain [M79.2]  Yes    Acute UTI (urinary tract infection) [N39.0]  Yes    VIPUL (acute kidney injury) [N17.9]  Yes    Seizure [R56.9]  Yes    Hyperlipidemia [E78.5]  Yes    Type 2 diabetes mellitus with hyperglycemia, with long-term current use of insulin [E11.65, Z79.4]  Not Applicable    Benign essential hypertension [I10]  Yes      Resolved Hospital Problems   No resolved problems to display.       83 y.o. female admitted with Sepsis.    Sepsis  Recurrent C. Difficile colitis  - ID consulted and following. Patient has been on Vancomycin, but Dificid has now been approved on 01/31, so Vancomycin was stopped and Dificid was started (01/31/24), she remains on this at present. WBC remains elevated, relatively unchanged from prior, but symptoms are improving. ID plans for 10-day course, and after completion, ID recommending daily probiotic x 3 months. Discussed with CCP, she will not be able to take this medication at rehab, so will likely have to stay here to complete antibiotic course.   - Will continue to follow ID plans/recommendations. Greatly appreciate their help.  - Order repeat CBC in AM for reassessment of WBC count.    Acute kidney injury  - Etiology most likely pre-renal in nature, due to volume depletion, decreased PO intake, impaired autoregulation from Lisinopril.  Nephrology following, creatinine peaked at 4.99, now improving with IVF on most recent labs.  - Continue IVF for volume resuscitation and closely monitor. Will continue to follow their plans/recommendations. Greatly appreciate their help.  - Order repeat BMP in AM for reassessment.    Type 2 diabetes with hyperglycemia  - Most recent A1c: 8.20% (12/24/23).  - Current treatment regimen: Glargine 7 units nightly, correctional SSI; Glucose uncontrolled, warrants adjustment.  - Increase glargine to 12 units nightly, add lispro 2 units TID with meals, continue correctional SSI.  - Will monitor 24 hour insulin requirements and adjust as needed to achieve target inpatient range of 140-180.    Hypertension  - Blood pressure appears stable and acceptable acutely at this time. No indications to warrant acute changes/intervention at this time.  - Continue current medications as prescribed. Trend BP to guide ongoing management decisions.    Anemia  - Hemoglobin low on most recent labs, however, stable from prior. No evidence of overt blood loss. No indications for acute intervention at this time.    - Order repeat CBC in AM for reassessment. Continue to monitor, transfuse for hemoglobin <7.    Hypothyroidism  - Continue levothyroxine as prescribed.    Hypokalemia  - Noted on most recent labs, replete per protocol if okay with Nephrology.    Hyperlipidemia  - Continue Statin as prescribed.      SCDs for DVT prophylaxis.  Limited code (no CPR, no intubation).  Discussed with patient, nursing staff, CCP, and care team on multidisciplinary rounds.  Anticipate discharge to SNU facility next week.      Zachariah Ivory DO  [facility]  02/01/24

## 2024-02-01 NOTE — PLAN OF CARE
Goal Outcome Evaluation:      84 yo female admitted 1/27 with sepsis. On oral dificid for c-diff. Several episodes of diarrhea with incontinence this shift. Wound care done per orders. Potassium replaced per protocol. VS stable, room air, A&Ox4.

## 2024-02-01 NOTE — PROGRESS NOTES
[facility] Progress Note      Patient Name: Halie Guidry  : 1940  MRN: [id number]  Primary Care Physician:  Luis Felipe Flowers MD  Date of admission: 2024    Subjective     Interval History:   Diarrhea decreasing in frequency; less liquidy as well  Abdominal pain less; starting to eat better  No shortness of breath on room air  Urinary retention, requiring periodic I/O catheterization    Review of Systems:   As noted above    Objective     Vitals:   Temp:  [97.7 °F (36.5 °C)-98.8 °F (37.1 °C)] 98.6 °F (37 °C)  Heart Rate:  [72-75] 72  Resp:  [16-18] 18  BP: (146-165)/(57-63) 146/57  Flow (L/min):  [1] 1 (actually on room air)    Intake/Output Summary (Last 24 hours) at 2024 1138  Last data filed at 2024 0836  Gross per 24 hour   Intake 0 ml   Output --   Net 0 ml         Physical Exam:    General Appearance: alert, appropriate, chronically ill, overweight  Skin: warm and dry  HEENT: nonicteric sclera, dry MM  Neck: supple, no JVD  Lungs: Coarse, but no wheezing; not labored on room air  Heart: RRR, normal S1 and S2  Abdomen: soft, minimally tender and no G or R, nondistended, BS +  : no palpable bladder  Extremities: +1-2 edema, cyanosis or clubbing  Neuro: Moves all extremities    Scheduled Meds:     atorvastatin, 80 mg, Oral, Nightly  bisoprolol, 5 mg, Oral, Daily  castor oil-balsam peru, 1 Application, Topical, Q12H  famotidine, 20 mg, Oral, Daily  fidaxomicin, 200 mg, Oral, Q12H  insulin glargine, 7 Units, Subcutaneous, Nightly  insulin lispro, 2-7 Units, Subcutaneous, 4x Daily AC & at Bedtime  levothyroxine, 112 mcg, Oral, Once per day on   levothyroxine, 168 mcg, Oral, Weekly  Menthol-Zinc Oxide, 1 Application, Topical, 4x Daily  multivitamin with minerals, 1 tablet, Oral, Daily  pregabalin, 50 mg, Oral, Daily  cyanocobalamin, 1,000 mcg, Oral, Daily      IV Meds:   lactated ringers, 100 mL/hr, Last Rate:  100 mL/hr (02/01/24 0931)  Pharmacy Consult,   Pharmacy Consult,         Results Reviewed:   I have personally reviewed the results from the time of this admission to 2/1/2024 11:38 EST     Results from last 7 days   Lab Units 02/01/24  0421 01/31/24  0450 01/30/24  1501 01/30/24  0513 01/28/24  0928 01/27/24  1615   SODIUM mmol/L 142 142  --  143   < > 137   POTASSIUM mmol/L 3.2* 3.3* 3.4* 2.9*   < > 4.5   CHLORIDE mmol/L 111* 110*  --  110*   < > 104   CO2 mmol/L 21.0* 18.6*  --  21.0*   < > 14.0*   BUN mg/dL 22 34*  --  48*   < > 68*   CREATININE mg/dL 1.36* 1.41*  --  2.20*   < > 4.99*   CALCIUM mg/dL 7.7* 7.4*  --  7.5*   < > 9.0   BILIRUBIN mg/dL  --  0.3  --  <0.2  --  0.3   ALK PHOS U/L  --  93  --  115  --  101   ALT (SGPT) U/L  --  7  --  11  --  13   AST (SGOT) U/L  --  19  --  18  --  23   GLUCOSE mg/dL 224* 189*  --  85   < > 173*    < > = values in this interval not displayed.       Estimated Creatinine Clearance: 38.6 mL/min (A) (by C-G formula based on SCr of 1.36 mg/dL (H)).    Results from last 7 days   Lab Units 02/01/24 0421 01/31/24  0450 01/30/24  0513 01/29/24  0504   MAGNESIUM mg/dL 1.6 2.0 1.7 2.0   PHOSPHORUS mg/dL 2.6 2.7  --  4.8*             Results from last 7 days   Lab Units 02/01/24  0421 01/31/24  0450 01/30/24  0513 01/29/24  0504 01/28/24  0928   WBC 10*3/mm3 23.30* 23.86* 23.88* 22.24* 20.38*   HEMOGLOBIN g/dL 10.4* 11.3* 10.2* 10.8* 10.4*   PLATELETS 10*3/mm3 293 327 311 338 304             Assessment / Plan     ASSESSMENT:  1.  VIPUL, nonoliguric, steadily improving:  prerenal due to volume depletion from diarrhea and poor appetite, with limited renal autoregulation due to lisinopril.  Low potassium and magnesium in face of poor nutrition and diarrhea; likely NAGMA due to diarrhea and VIPUL.  Edema worsening.  Currently on LR.  2.  Toe ulcers both feet and sacral decubitus  3.  Recurrent C. difficile colitis  4.  DM2  5.  Anemia  6.  Recent COVID infection      PLAN:  1.  Stop IVF  since diarrhea is lessening and her appetite is improving  2.  Replace potassium per protocol, and replace magnesium  3.  Surveillance labs    Thank you for involving us in the care of Halie Guidry.  Please feel free to call with any questions.    Derrick Murry MD  02/01/24  11:38 [facility]    [facility]  [phone]    Please note that portions of this note were completed with a voice recognition program.

## 2024-02-01 NOTE — PLAN OF CARE
Goal Outcome Evaluation:  Plan of Care Reviewed With: patient        Progress: improving  Outcome Evaluation: Pt seen by PT / Ot for co-tx. Pt made several improvements today w/ sitting to EOB, standing and gait. Pt sat up to EOB w/ CGA. Pt stood 2x w/ mod/max A x 2 and use of fww. Pt took a few steps to chair req min A x 2 and use of fww. Pt limited by pain in B LE and activity intolerance. Pt UIC at end of session. PT will prog as pt suzanne.      Anticipated Discharge Disposition (PT): skilled nursing facility                         Mom has bday party planned for child. Will call back end of day w decision.

## 2024-02-01 NOTE — PLAN OF CARE
Goal Outcome Evaluation:   Vitals stable. LR infusing @ 100mL/hr. Turns q 2 hrs. Heels elevated and off-loaded with protective boots and pillows. X1 BM tonight. Creams applied to bottom and venelex to bilateral heels. No complaints of pain or nausea. Contact spore precautions maintained. PO Dificid started for c.diff - 9 more days before able to D/C back to facility. LALM. Safety maintained

## 2024-02-01 NOTE — THERAPY TREATMENT NOTE
Patient Name: Halie Guidry  : 1940    MRN: [id number]                              Today's Date: 2024       Admit Date: 2024    Visit Dx:     ICD-10-CM ICD-9-CM   1. Sepsis with acute renal failure, due to unspecified organism, unspecified acute renal failure type, unspecified whether septic shock present  A41.9 038.9    R65.20 995.92    N17.9 584.9   2. Encephalopathy  G93.40 348.30     Patient Active Problem List   Diagnosis    Compression fracture    Lumbar degenerative disc disease    Type 2 diabetes mellitus with hyperglycemia, with long-term current use of insulin    Hyperlipidemia    Benign essential hypertension    Primary hypothyroidism    Neuropathy    Osteoporosis    Proteinuria    Tobacco abuse    Diabetic eye exam    Generalized weakness    Spinal stenosis    Scoliosis    Arthritis    VBI (vertebrobasilar insufficiency)    Orthostatic hypotension    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    Severe hypothyroidism    Noncompliance with medication regimen    Vitamin D deficiency disease    Altered mental status    Tremor    Seizure    Stage 3a chronic kidney disease    Thoracic degenerative disc disease    Metabolic encephalopathy    VIPUL (acute kidney injury)    Hyperglycemia    Type II diabetes mellitus, uncontrolled    Lower abdominal pain    Transaminitis    UTI (urinary tract infection)    Emphysematous cystitis    Choledocholithiasis    Hypokalemia    Hypoxia    Generalized abdominal pain    History of Clostridium difficile infection    History of ERCP    Acute UTI (urinary tract infection)    Hypomagnesemia    Burning pain    Anxiety disorder    Neuropathic pain    Intertrigo    Weakness of both lower extremities    Accelerated hypertension    Acute cystitis without hematuria    Altered mental status, unspecified altered mental status type    Left lower lobe pneumonia    Acute pain of left foot    Cellulitis and abscess of left lower extremity    Hypertensive  kidney disease with stage 3a chronic kidney disease    Bilateral leg pain    Peripheral edema    Primary malignant neuroendocrine tumor of pancreas    Encounter for long-term (current) use of high-risk medication    Diabetic foot ulcer    Acute renal insufficiency    C. difficile diarrhea    Sepsis    Stage 3a chronic kidney disease    Metabolic encephalopathy    Pressure injury of buttock, stage 2     Past Medical History:   Diagnosis Date    Acute metabolic encephalopathy 6/24/2022    Anxiety     Arthritis     Benign essential hypertension 08/20/2014    Bleeding disorder     Depression     Diabetes     Diabetes mellitus, type 2     Disc degeneration, lumbar     Headache, tension-type     Hyperlipidemia     Hypothyroidism     Neuropathy     Osteoporosis 09/09/2015    Peripheral neuropathy     Rotator cuff tear, left     Scoliosis     Shoulder pain     LEFT, TORN ROTATOR CUFF S/P FALL    Spinal stenosis      Past Surgical History:   Procedure Laterality Date    APPENDECTOMY      BILATERAL BREAST REDUCTION Bilateral 08/2015    CATARACT EXTRACTION  03/2015    CHOLECYSTECTOMY WITH INTRAOPERATIVE CHOLANGIOGRAM N/A 3/27/2022    Procedure: CHOLECYSTECTOMY LAPAROSCOPIC INTRAOPERATIVE CHOLANGIOGRAM;  Surgeon: Aiyana Hill MD;  Location: [facility];  Service: General;  Laterality: N/A;    COLONOSCOPY  06/05/2015    WNL    ERCP N/A 2/25/2022    Procedure: ENDOSCOPIC RETROGRADE CHOLANGIOPANCREATOGRAPHY with sphincterotomy and balloon sweep;  Surgeon: Chilo Wilhelm MD;  Location: [facility] ENDOSCOPY;  Service: Gastroenterology;  Laterality: N/A;  PRE/POST - CBD stones    ERCP N/A 3/28/2022    Procedure: ENDOSCOPIC RETROGRADE CHOLANGIOPANCREATOGRAPHY WITH SPHINCTEROTOMY AND BALLOON SWEEP;  Surgeon: Chilo Wilhelm MD;  Location: [facility] ENDOSCOPY;  Service: Gastroenterology;  Laterality: N/A;  PRE: COMMON DUCT STONE  POST: COMMON DUCT STONE    KYPHOPLASTY      REDUCTION MAMMAPLASTY      TONSILLECTOMY         General Information       [facility] Name 02/01/24 1417          Physical Therapy Time and Intention    Document Type therapy note (daily note)  -     Mode of Treatment co-treatment;occupational therapy;physical therapy  -       Row Name 02/01/24 1417          General Information    Existing Precautions/Restrictions fall  -[facility] Name 02/01/24 1417          Safety Issues, Functional Mobility    Comment, Safety Issues/Impairments (Mobility) Co treatment medically appropriate and necessary due to patient acuity level, activity tolerance and safety of patient and staff. Treatment is focusing on progression of care and goals established in the POC. gt belt and non skid socks donned  -PH               User Key  (r) = Recorded By, (t) = Taken By, (c) = Cosigned By      Initials Name Provider Type    PH Joana Pimentel PTA Physical Therapist Assistant                   Mobility       [facility] Name 02/01/24 1418          Bed Mobility    Bed Mobility supine-sit  -PH     Sit-Supine [place] (Bed Mobility) verbal cues;nonverbal cues (demo/gesture);contact guard  -PH     Comment, (Bed Mobility) incr time w/ pt pulling on this PTA's R hand to sit up to EOB; cues for sequencing; pt stopped to take a few rests as she fatigued quickly  -[facility] Name 02/01/24 1418          Sit-Stand Transfer    Sit-Stand [place] (Transfers) moderate assist (50% patient effort);maximum assist (25% patient effort);verbal cues;nonverbal cues (demo/gesture);2 person assist  -     Assistive Device (Sit-Stand Transfers) walker, front-wheeled  -PH     Comment, (Sit-Stand Transfer) 2x from EOB; extra time to get balance after standing; cues for B hand and foot placement  -[facility] Name 02/01/24 1418          Gait/Stairs (Locomotion)    [place] Level (Gait) minimum assist (75% patient effort);2 person assist;verbal cues;nonverbal cues (demo/gesture)  -     Assistive Device (Gait) walker, front-wheeled  -     Distance in  Feet (Gait) 6-7 steps  -PH     Deviations/Abnormal Patterns (Gait) tess decreased;festinating/shuffling;gait speed decreased;stride length decreased;weight shifting decreased  -PH     Bilateral Gait Deviations heel strike decreased;forward flexed posture  -PH     [place] Level (Stairs) unable to assess  -PH     Comment, (Gait/Stairs) slow w/ no overt LOB  -PH               User Key  (r) = Recorded By, (t) = Taken By, (c) = Cosigned By      Initials Name Provider Type     Joana Pimentel PTA Physical Therapist Assistant                   Obj/Interventions       Row Name 02/01/24 1420          Balance    Balance Assessment sitting static balance;sitting dynamic balance  -PH     Static Sitting Balance standby assist  -PH     Dynamic Sitting Balance minimal assist;verbal cues;non-verbal cues (demo/gesture)  -PH     Position, Sitting Balance supported;unsupported;sitting edge of bed  -PH               User Key  (r) = Recorded By, (t) = Taken By, (c) = Cosigned By      Initials Name Provider Type     Joana Pimentel PTA Physical Therapist Assistant                   Goals/Plan    No documentation.                  Clinical Impression       Row Name 02/01/24 1421          Pain    Pretreatment Pain Rating 4/10  -PH     Posttreatment Pain Rating 4/10  -PH     Pain Location - Side/Orientation Bilateral  -PH     Pain Location lower  -PH     Pain Location - extremity  -PH     Pain Intervention(s) Repositioned;Rest;Ambulation/increased activity  -PH       Row Name 02/01/24 1421          Plan of Care Review    Plan of Care Reviewed With patient  -PH     Progress improving  -PH     Outcome Evaluation Pt seen by PT / Ot for co-tx. Pt made several improvements today w/ sitting to EOB, standing and gait. Pt sat up to EOB w/ CGA. Pt stood 2x w/ mod/max A x 2 and use of fww. Pt took a few steps to chair req min A x 2 and use of fww. Pt limited by pain in B LE and activity intolerance. Pt UIC at end of  session. PT will prog as pt suzanne.  -PH       Row Name 02/01/24 1421          Vital Signs    O2 Delivery Pre Treatment room air  -PH     O2 Delivery Intra Treatment room air  -PH     O2 Delivery Post Treatment room air  -PH       Row Name 02/01/24 1421          Positioning and Restraints    Pre-Treatment Position in bed  -PH     Post Treatment Position chair  -PH     In Chair reclined;call light within reach;encouraged to call for assist;exit alarm on;notified nsg  -PH               User Key  (r) = Recorded By, (t) = Taken By, (c) = Cosigned By      Initials Name Provider Type     Joana Pimentel PTA Physical Therapist Assistant                   Outcome Measures       Row Name 02/01/24 1423          How much help from another person do you currently need...    Turning from your back to your side while in flat bed without using bedrails? 2  -PH     Moving from lying on back to sitting on the side of a flat bed without bedrails? 2  -PH     Moving to and from a bed to a chair (including a wheelchair)? 2  -PH     Standing up from a chair using your arms (e.g., wheelchair, bedside chair)? 2  -PH     Climbing 3-5 steps with a railing? 1  -PH     To walk in hospital room? 1  -PH     AM-PAC 6 Clicks Score (PT) 10  -PH     Highest Level of Mobility Goal 4 --> Transfer to chair/commode  -PH       Row Name 02/01/24 1423          Functional Assessment    Outcome Measure Options AM-PAC 6 Clicks Basic Mobility (PT)  -PH               User Key  (r) = Recorded By, (t) = Taken By, (c) = Cosigned By      Initials Name Provider Type     Joana Pimentel PTA Physical Therapist Assistant                                 Physical Therapy Education       Title: PT OT SLP Therapies (Done)       Topic: Physical Therapy (Done)       Point: Mobility training (Done)       Learning Progress Summary             Patient Acceptance, E,TB, VU,NR,DU by  at 2/1/2024 1423    Acceptance, E,TB, VU,DU,NR by  at 1/30/2024 1002     Acceptance, E,TB, VU,NR by [facility] at 1/28/2024 0848                         Point: Home exercise program (Done)       Learning Progress Summary             Patient Acceptance, E,TB, VU,NR,DU by  at 2/1/2024 1423    Acceptance, E,TB, VU,DU,NR by  at 1/30/2024 1002    Acceptance, E,TB, VU,NR by [facility] at 1/28/2024 0848                         Point: Body mechanics (Done)       Learning Progress Summary             Patient Acceptance, E,TB, VU,NR,DU by  at 2/1/2024 1423    Acceptance, E,TB, VU,DU,NR by  at 1/30/2024 1002    Acceptance, E,TB, VU,NR by [facility] at 1/28/2024 0848                         Point: Precautions (Done)       Learning Progress Summary             Patient Acceptance, E,TB, VU,NR,DU by  at 2/1/2024 1423    Acceptance, E,TB, VU,DU,NR by  at 1/30/2024 1002    Acceptance, E,TB, VU,NR by [facility] at 1/28/2024 0848                                         User Key       Initials Effective Dates Name Provider Type Discipline    [facility] 07/11/23 -  Bakari Lerma, PT Physical Therapist PT     06/16/21 -  Joana Pimentel PTA Physical Therapist Assistant PT     09/22/22 -  Lizzy Waterman, PT Physical Therapist PT                  PT Recommendation and Plan     Plan of Care Reviewed With: patient  Progress: improving  Outcome Evaluation: Pt seen by PT / Ot for co-tx. Pt made several improvements today w/ sitting to EOB, standing and gait. Pt sat up to EOB w/ CGA. Pt stood 2x w/ mod/max A x 2 and use of fww. Pt took a few steps to chair req min A x 2 and use of fww. Pt limited by pain in B LE and activity intolerance. Pt UIC at end of session. PT will prog as pt suzanne.     Time Calculation:         PT Charges       Row Name 02/01/24 1423             Time Calculation    Start Time 1349  -PH      Stop Time 1415  -PH      Time Calculation (min) 26 min  -PH      PT Received On 02/01/24  -      PT - Next Appointment 02/02/24  -         Timed Charges    72140 - PT Therapeutic Activity Minutes 26  -PH          Total Minutes    Timed Charges Total Minutes 26  -PH       Total Minutes 26  -PH                User Key  (r) = Recorded By, (t) = Taken By, (c) = Cosigned By      Initials Name Provider Type    Joana Avelar PTA Physical Therapist Assistant                  Therapy Charges for Today       Code Description Service Date Service Provider Modifiers Qty    [id number]  PT THERAPEUTIC ACT EA 15 MIN 2/1/2024 Joana Pimentel PTA GP 2            PT G-Codes  Outcome Measure Options: AM-PAC 6 Clicks Basic Mobility (PT)  AM-PAC 6 Clicks Score (PT): 10  AM-PAC 6 Clicks Score (OT): 11  PT Discharge Summary  Anticipated Discharge Disposition (PT): skilled nursing facility    Joana Pimentel PTA  2/1/2024

## 2024-02-01 NOTE — PLAN OF CARE
Goal Outcome Evaluation:  Plan of Care Reviewed With: patient        Progress: improving  Outcome Evaluation: Pt seen for OT/PT co-tx this date and tolerated well. Pt CGA bed mobility to sit EOB for BUE ther ex AROM and AAROM and balance act. Pt fatigues quickly but improved act suzanne this PM. Pt STS MOD/MAX A x2 with FWWx and txfer few steps to chair MIN A x2. Pt UIC on departure. OT will continue to progress pt as suzanne. Anticipates return to [facility].      Anticipated Discharge Disposition (OT): skilled nursing facility, extended care facility

## 2024-02-02 LAB
ALBUMIN SERPL-MCNC: 2.2 G/DL (ref 3.5–5.2)
ANION GAP SERPL CALCULATED.3IONS-SCNC: 10 MMOL/L (ref 5–15)
BASOPHILS # BLD MANUAL: 0.19 10*3/MM3 (ref 0–0.2)
BASOPHILS NFR BLD MANUAL: 1 % (ref 0–1.5)
BUN SERPL-MCNC: 17 MG/DL (ref 8–23)
BUN/CREAT SERPL: 13.4 (ref 7–25)
CALCIUM SPEC-SCNC: 7.6 MG/DL (ref 8.6–10.5)
CHLORIDE SERPL-SCNC: 109 MMOL/L (ref 98–107)
CO2 SERPL-SCNC: 21 MMOL/L (ref 22–29)
CREAT SERPL-MCNC: 1.27 MG/DL (ref 0.57–1)
DEPRECATED RDW RBC AUTO: 48.9 FL (ref 37–54)
EGFRCR SERPLBLD CKD-EPI 2021: 42 ML/MIN/1.73
EOSINOPHIL # BLD MANUAL: 0.39 10*3/MM3 (ref 0–0.4)
EOSINOPHIL NFR BLD MANUAL: 2 % (ref 0.3–6.2)
ERYTHROCYTE [DISTWIDTH] IN BLOOD BY AUTOMATED COUNT: 15.8 % (ref 12.3–15.4)
GLUCOSE BLDC GLUCOMTR-MCNC: 239 MG/DL (ref 70–130)
GLUCOSE BLDC GLUCOMTR-MCNC: 254 MG/DL (ref 70–130)
GLUCOSE BLDC GLUCOMTR-MCNC: 259 MG/DL (ref 70–130)
GLUCOSE BLDC GLUCOMTR-MCNC: 311 MG/DL (ref 70–130)
GLUCOSE SERPL-MCNC: 211 MG/DL (ref 65–99)
HCT VFR BLD AUTO: 30.7 % (ref 34–46.6)
HGB BLD-MCNC: 9.8 G/DL (ref 12–15.9)
LYMPHOCYTES # BLD MANUAL: 0.39 10*3/MM3 (ref 0.7–3.1)
LYMPHOCYTES NFR BLD MANUAL: 1 % (ref 5–12)
MAGNESIUM SERPL-MCNC: 2 MG/DL (ref 1.6–2.4)
MCH RBC QN AUTO: 27.5 PG (ref 26.6–33)
MCHC RBC AUTO-ENTMCNC: 31.9 G/DL (ref 31.5–35.7)
MCV RBC AUTO: 86.2 FL (ref 79–97)
MONOCYTES # BLD: 0.19 10*3/MM3 (ref 0.1–0.9)
NEUTROPHILS # BLD AUTO: 18.17 10*3/MM3 (ref 1.7–7)
NEUTROPHILS NFR BLD MANUAL: 93.9 % (ref 42.7–76)
NRBC BLD AUTO-RTO: 0.3 /100 WBC (ref 0–0.2)
NRBC SPEC MANUAL: 1 /100 WBC (ref 0–0.2)
PHOSPHATE SERPL-MCNC: 2.4 MG/DL (ref 2.5–4.5)
PLAT MORPH BLD: NORMAL
PLATELET # BLD AUTO: 260 10*3/MM3 (ref 140–450)
PMV BLD AUTO: 9.6 FL (ref 6–12)
POTASSIUM SERPL-SCNC: 4 MMOL/L (ref 3.5–5.2)
RBC # BLD AUTO: 3.56 10*6/MM3 (ref 3.77–5.28)
RBC MORPH BLD: NORMAL
SODIUM SERPL-SCNC: 140 MMOL/L (ref 136–145)
VARIANT LYMPHS NFR BLD MANUAL: 2 % (ref 19.6–45.3)
WBC MORPH BLD: NORMAL
WBC NRBC COR # BLD AUTO: 19.35 10*3/MM3 (ref 3.4–10.8)

## 2024-02-02 PROCEDURE — 63710000001 INSULIN GLARGINE PER 5 UNITS: Performed by: STUDENT IN AN ORGANIZED HEALTH CARE EDUCATION/TRAINING PROGRAM

## 2024-02-02 PROCEDURE — 97530 THERAPEUTIC ACTIVITIES: CPT

## 2024-02-02 PROCEDURE — 63710000001 INSULIN LISPRO (HUMAN) PER 5 UNITS: Performed by: STUDENT IN AN ORGANIZED HEALTH CARE EDUCATION/TRAINING PROGRAM

## 2024-02-02 PROCEDURE — 83735 ASSAY OF MAGNESIUM: CPT | Performed by: INTERNAL MEDICINE

## 2024-02-02 PROCEDURE — 63710000001 INSULIN LISPRO (HUMAN) PER 5 UNITS: Performed by: INTERNAL MEDICINE

## 2024-02-02 PROCEDURE — 80069 RENAL FUNCTION PANEL: CPT | Performed by: INTERNAL MEDICINE

## 2024-02-02 PROCEDURE — 85025 COMPLETE CBC W/AUTO DIFF WBC: CPT | Performed by: STUDENT IN AN ORGANIZED HEALTH CARE EDUCATION/TRAINING PROGRAM

## 2024-02-02 PROCEDURE — 85007 BL SMEAR W/DIFF WBC COUNT: CPT | Performed by: STUDENT IN AN ORGANIZED HEALTH CARE EDUCATION/TRAINING PROGRAM

## 2024-02-02 PROCEDURE — 25810000003 SODIUM CHLORIDE 0.9 % SOLUTION: Performed by: INTERNAL MEDICINE

## 2024-02-02 PROCEDURE — 82948 REAGENT STRIP/BLOOD GLUCOSE: CPT

## 2024-02-02 RX ORDER — HYDROXYZINE HYDROCHLORIDE 10 MG/1
10 TABLET, FILM COATED ORAL 3 TIMES DAILY PRN
Status: DISCONTINUED | OUTPATIENT
Start: 2024-02-02 | End: 2024-02-10 | Stop reason: HOSPADM

## 2024-02-02 RX ORDER — INSULIN LISPRO 100 [IU]/ML
5 INJECTION, SOLUTION INTRAVENOUS; SUBCUTANEOUS
Status: DISCONTINUED | OUTPATIENT
Start: 2024-02-02 | End: 2024-02-03

## 2024-02-02 RX ADMIN — PREGABALIN 50 MG: 50 CAPSULE ORAL at 08:29

## 2024-02-02 RX ADMIN — INSULIN LISPRO 3 UNITS: 100 INJECTION, SOLUTION INTRAVENOUS; SUBCUTANEOUS at 12:12

## 2024-02-02 RX ADMIN — ANORECTAL OINTMENT 1 APPLICATION: 15.7; .44; 24; 20.6 OINTMENT TOPICAL at 13:33

## 2024-02-02 RX ADMIN — INSULIN LISPRO 4 UNITS: 100 INJECTION, SOLUTION INTRAVENOUS; SUBCUTANEOUS at 08:33

## 2024-02-02 RX ADMIN — INSULIN LISPRO 5 UNITS: 100 INJECTION, SOLUTION INTRAVENOUS; SUBCUTANEOUS at 17:36

## 2024-02-02 RX ADMIN — ANORECTAL OINTMENT 1 APPLICATION: 15.7; .44; 24; 20.6 OINTMENT TOPICAL at 20:31

## 2024-02-02 RX ADMIN — CASTOR OIL AND BALSAM, PERU 1 APPLICATION: 788; 87 OINTMENT TOPICAL at 10:07

## 2024-02-02 RX ADMIN — LEVOTHYROXINE SODIUM 112 MCG: 112 TABLET ORAL at 06:59

## 2024-02-02 RX ADMIN — INSULIN LISPRO 2 UNITS: 100 INJECTION, SOLUTION INTRAVENOUS; SUBCUTANEOUS at 12:13

## 2024-02-02 RX ADMIN — HYDROCODONE BITARTRATE AND ACETAMINOPHEN 1 TABLET: 10; 325 TABLET ORAL at 21:18

## 2024-02-02 RX ADMIN — INSULIN LISPRO 2 UNITS: 100 INJECTION, SOLUTION INTRAVENOUS; SUBCUTANEOUS at 08:33

## 2024-02-02 RX ADMIN — SODIUM PHOSPHATE, MONOBASIC, MONOHYDRATE AND SODIUM PHOSPHATE, DIBASIC, ANHYDROUS 15 MMOL: 142; 276 INJECTION, SOLUTION INTRAVENOUS at 20:16

## 2024-02-02 RX ADMIN — FIDAXOMICIN 200 MG: 200 TABLET, FILM COATED ORAL at 20:31

## 2024-02-02 RX ADMIN — Medication 1000 MCG: at 08:29

## 2024-02-02 RX ADMIN — HYDROCODONE BITARTRATE AND ACETAMINOPHEN 1 TABLET: 10; 325 TABLET ORAL at 08:28

## 2024-02-02 RX ADMIN — CASTOR OIL AND BALSAM, PERU 1 APPLICATION: 788; 87 OINTMENT TOPICAL at 20:31

## 2024-02-02 RX ADMIN — Medication 1 TABLET: at 08:29

## 2024-02-02 RX ADMIN — INSULIN LISPRO 4 UNITS: 100 INJECTION, SOLUTION INTRAVENOUS; SUBCUTANEOUS at 21:18

## 2024-02-02 RX ADMIN — HYDROXYZINE HYDROCHLORIDE 10 MG: 10 TABLET ORAL at 23:13

## 2024-02-02 RX ADMIN — FAMOTIDINE 20 MG: 20 TABLET, FILM COATED ORAL at 08:29

## 2024-02-02 RX ADMIN — ATORVASTATIN CALCIUM 80 MG: 80 TABLET, FILM COATED ORAL at 20:31

## 2024-02-02 RX ADMIN — HYDROXYZINE HYDROCHLORIDE 10 MG: 10 TABLET ORAL at 15:49

## 2024-02-02 RX ADMIN — BISOPROLOL FUMARATE 5 MG: 5 TABLET, FILM COATED ORAL at 08:29

## 2024-02-02 RX ADMIN — FIDAXOMICIN 200 MG: 200 TABLET, FILM COATED ORAL at 08:29

## 2024-02-02 RX ADMIN — ANORECTAL OINTMENT 1 APPLICATION: 15.7; .44; 24; 20.6 OINTMENT TOPICAL at 18:42

## 2024-02-02 RX ADMIN — INSULIN GLARGINE 15 UNITS: 100 INJECTION, SOLUTION SUBCUTANEOUS at 21:19

## 2024-02-02 NOTE — PLAN OF CARE
Goal Outcome Evaluation:  Plan of Care Reviewed With: patient        Progress: no change  Outcome Evaluation: Pt continues to be agreeable to PT tx. Pt was very anxious and given emotional support. RN notified of pt request for anxiety meds.  Pt was in bed at sat up to EOB w/ mod A. Pt then stood 3x from EOB req mod/max a x 2 . Pt's longest stand was approx 1 min req min A x 2 w /use of fww. Pt hips remained in flexion in spite of several cues for postural correction. Pt amb approx 3' to chair req CGA/min A x 2 and use of fww. Pt was slow w/ no overt LOB. Pain in B feet/LE as well as activity intolerance limiting distance. PT will prog as pt suzanne.      Anticipated Discharge Disposition (PT): skilled nursing facility

## 2024-02-02 NOTE — PLAN OF CARE
"Goal Outcome Evaluation:      82 yo female admitted 1/27 with sepsis. Pt complains of \"panic attacks\" this morning and endorses anxiety over ongoing hospitalization. PRN atarax ordered and administered. On oral dificid for c-diff. Continues to have diarrhea with incontinence, seems to be improving in frequency and volume. Wound care done per orders. VS stable, room air, A&Ox4.                                          "

## 2024-02-02 NOTE — THERAPY TREATMENT NOTE
Patient Name: Halie Guidry  : 1940    MRN: [id number]                              Today's Date: 2024       Admit Date: 2024    Visit Dx:     ICD-10-CM ICD-9-CM   1. Sepsis with acute renal failure, due to unspecified organism, unspecified acute renal failure type, unspecified whether septic shock present  A41.9 038.9    R65.20 995.92    N17.9 584.9   2. Encephalopathy  G93.40 348.30     Patient Active Problem List   Diagnosis    Compression fracture    Lumbar degenerative disc disease    Type 2 diabetes mellitus with hyperglycemia, with long-term current use of insulin    Hyperlipidemia    Benign essential hypertension    Primary hypothyroidism    Neuropathy    Osteoporosis    Proteinuria    Tobacco abuse    Diabetic eye exam    Generalized weakness    Spinal stenosis    Scoliosis    Arthritis    VBI (vertebrobasilar insufficiency)    Orthostatic hypotension    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    Severe hypothyroidism    Noncompliance with medication regimen    Vitamin D deficiency disease    Altered mental status    Tremor    Seizure    Stage 3a chronic kidney disease    Thoracic degenerative disc disease    Metabolic encephalopathy    VIPUL (acute kidney injury)    Hyperglycemia    Type II diabetes mellitus, uncontrolled    Lower abdominal pain    Transaminitis    UTI (urinary tract infection)    Emphysematous cystitis    Choledocholithiasis    Hypokalemia    Hypoxia    Generalized abdominal pain    History of Clostridium difficile infection    History of ERCP    Acute UTI (urinary tract infection)    Hypomagnesemia    Burning pain    Anxiety disorder    Neuropathic pain    Intertrigo    Weakness of both lower extremities    Accelerated hypertension    Acute cystitis without hematuria    Altered mental status, unspecified altered mental status type    Left lower lobe pneumonia    Acute pain of left foot    Cellulitis and abscess of left lower extremity    Hypertensive  kidney disease with stage 3a chronic kidney disease    Bilateral leg pain    Peripheral edema    Primary malignant neuroendocrine tumor of pancreas    Encounter for long-term (current) use of high-risk medication    Diabetic foot ulcer    Acute renal insufficiency    C. difficile diarrhea    Sepsis    Stage 3a chronic kidney disease    Metabolic encephalopathy    Pressure injury of buttock, stage 2     Past Medical History:   Diagnosis Date    Acute metabolic encephalopathy 6/24/2022    Anxiety     Arthritis     Benign essential hypertension 08/20/2014    Bleeding disorder     Depression     Diabetes     Diabetes mellitus, type 2     Disc degeneration, lumbar     Headache, tension-type     Hyperlipidemia     Hypothyroidism     Neuropathy     Osteoporosis 09/09/2015    Peripheral neuropathy     Rotator cuff tear, left     Scoliosis     Shoulder pain     LEFT, TORN ROTATOR CUFF S/P FALL    Spinal stenosis      Past Surgical History:   Procedure Laterality Date    APPENDECTOMY      BILATERAL BREAST REDUCTION Bilateral 08/2015    CATARACT EXTRACTION  03/2015    CHOLECYSTECTOMY WITH INTRAOPERATIVE CHOLANGIOGRAM N/A 3/27/2022    Procedure: CHOLECYSTECTOMY LAPAROSCOPIC INTRAOPERATIVE CHOLANGIOGRAM;  Surgeon: Aiyana Hill MD;  Location: [facility];  Service: General;  Laterality: N/A;    COLONOSCOPY  06/05/2015    WNL    ERCP N/A 2/25/2022    Procedure: ENDOSCOPIC RETROGRADE CHOLANGIOPANCREATOGRAPHY with sphincterotomy and balloon sweep;  Surgeon: Chilo Wilhelm MD;  Location: [facility] ENDOSCOPY;  Service: Gastroenterology;  Laterality: N/A;  PRE/POST - CBD stones    ERCP N/A 3/28/2022    Procedure: ENDOSCOPIC RETROGRADE CHOLANGIOPANCREATOGRAPHY WITH SPHINCTEROTOMY AND BALLOON SWEEP;  Surgeon: Chilo Wilhelm MD;  Location: [facility] ENDOSCOPY;  Service: Gastroenterology;  Laterality: N/A;  PRE: COMMON DUCT STONE  POST: COMMON DUCT STONE    KYPHOPLASTY      REDUCTION MAMMAPLASTY      TONSILLECTOMY         General Information       [facility] Name 02/02/24 1028          Physical Therapy Time and Intention    Document Type therapy note (daily note)  -PH     Mode of Treatment physical therapy  -       Row Name 02/02/24 1028          General Information    Existing Precautions/Restrictions fall  -     Barriers to Rehab previous functional deficit;medically complex  -PH       [facility] Name 02/02/24 1028          Cognition    Orientation Status (Cognition) oriented x 3  -PH       [facility] Name 02/02/24 1028          Safety Issues, Functional Mobility    Safety Issues Affecting Function (Mobility) insight into deficits/self-awareness;judgment;problem-solving  -     Impairments Affecting Function (Mobility) balance;endurance/activity tolerance;strength;range of motion (ROM)  -     Comment, Safety Issues/Impairments (Mobility) gt belt and non skid socks donned  -               User Key  (r) = Recorded By, (t) = Taken By, (c) = Cosigned By      Initials Name Provider Type    PH Joana Pimentel PTA Physical Therapist Assistant                   Mobility       Row Name 02/02/24 1029          Bed Mobility    Bed Mobility supine-sit  -PH     Supine-Sit [place] (Bed Mobility) moderate assist (50% patient effort);verbal cues;nonverbal cues (demo/gesture);1 person assist;2 person assist  -PH     Assistive Device (Bed Mobility) bed rails;head of bed elevated  -PH     Comment, (Bed Mobility) incr time w/ cues for sequencing  -[facility] Name 02/02/24 1029          Bed-Chair Transfer    Bed-Chair [place] (Transfers) contact guard;minimum assist (75% patient effort);2 person assist;verbal cues;nonverbal cues (demo/gesture)  -     Assistive Device (Bed-Chair Transfers) walker, front-wheeled  -       Row Name 02/02/24 1029          Sit-Stand Transfer    Sit-Stand [place] (Transfers) moderate assist (50% patient effort);maximum assist (25% patient effort);2 person assist;verbal cues;nonverbal cues (demo/gesture)  -      Assistive Device (Sit-Stand Transfers) walker, front-wheeled  -PH     Comment, (Sit-Stand Transfer) cues for B hand and foot placement then cues for postural correction when standing. pt fwd flexed at hips w/ B knees in slight flexion.  -PH       Row Name 02/02/24 1029          Gait/Stairs (Locomotion)    [place] Level (Gait) contact guard;minimum assist (75% patient effort);2 person assist;verbal cues;nonverbal cues (demo/gesture)  -PH     Assistive Device (Gait) walker, front-wheeled  -PH     Distance in Feet (Gait) 3' to chair  -PH     Deviations/Abnormal Patterns (Gait) tess decreased;festinating/shuffling;gait speed decreased;stride length decreased;antalgic  -PH     Bilateral Gait Deviations forward flexed posture;heel strike decreased  -PH     [place] Level (Stairs) unable to assess  -PH     Comment, (Gait/Stairs) slow w/ no overt LOB; B foot and leg pain as well as activity intolerance limiting.  -PH               User Key  (r) = Recorded By, (t) = Taken By, (c) = Cosigned By      Initials Name Provider Type     Joana Pimentel PTA Physical Therapist Assistant                   Obj/Interventions       Row Name 02/02/24 1031          Motor Skills    Therapeutic Exercise other (see comments)  BAP, LAQ, seated march; x 10 reps all; freq direction to remain on task  -PH       Row Name 02/02/24 1031          Balance    Balance Assessment sitting static balance;standing static balance  -PH     Static Sitting Balance standby assist  -PH     Dynamic Standing Balance minimal assist;2-person assist;verbal cues;non-verbal cues (demo/gesture);moderate assist  -PH     Position/Device Used, Standing Balance walker, front-wheeled  -PH     Comment, Balance mostly min / mod A x 2 for 3 stands of approx 1 min at longest.  -PH               User Key  (r) = Recorded By, (t) = Taken By, (c) = Cosigned By      Initials Name Provider Type    Joana Avelar PTA Physical Therapist Assistant                    Goals/Plan    No documentation.                  Clinical Impression       Row Name 02/02/24 1032          Pain    Pretreatment Pain Rating 4/10  -PH     Posttreatment Pain Rating 4/10  -PH     Pain Location - Side/Orientation Bilateral  -PH     Pain Location lower  -PH     Pain Location - extremity;foot  -PH     Pain Intervention(s) Repositioned;Ambulation/increased activity;Rest  -PH     Additional Documentation Pain Scale: Numbers Pre/Post-Treatment (Group)  -PH       Row Name 02/02/24 1032          Plan of Care Review    Plan of Care Reviewed With patient  -PH     Progress no change  -PH     Outcome Evaluation Pt continues to be agreeable to PT tx. Pt was very anxious and given emotional support. RN notified of pt request for anxiety meds.  Pt was in bed at sat up to EOB w/ mod A. Pt then stood 3x from EOB req mod/max a x 2 . Pt's longest stand was approx 1 min req min A x 2 w /use of fww. Pt hips remained in flexion in spite of several cues for postural correction. Pt amb approx 3' to chair req CGA/min A x 2 and use of fww. Pt was slow w/ no overt LOB. Pain in B feet/LE as well as activity intolerance limiting distance. PT will prog as pt suzanne.  -PH       Row Name 02/02/24 1032          Vital Signs    O2 Delivery Pre Treatment room air  -PH     O2 Delivery Intra Treatment room air  -PH     O2 Delivery Post Treatment room air  -PH       Row Name 02/02/24 1032          Positioning and Restraints    Pre-Treatment Position in bed  -PH     Post Treatment Position chair  -PH     In Chair reclined;call light within reach;encouraged to call for assist;exit alarm on;notified ns  -PH               User Key  (r) = Recorded By, (t) = Taken By, (c) = Cosigned By      Initials Name Provider Type    PH Joana Pimentel PTA Physical Therapist Assistant                   Outcome Measures       Row Name 02/02/24 1035          How much help from another person do you currently need...    Turning from your back  to your side while in flat bed without using bedrails? 2  -PH     Moving from lying on back to sitting on the side of a flat bed without bedrails? 2  -PH     Moving to and from a bed to a chair (including a wheelchair)? 2  -PH     Standing up from a chair using your arms (e.g., wheelchair, bedside chair)? 2  -PH     Climbing 3-5 steps with a railing? 1  -PH     To walk in hospital room? 2  -PH     AM-PAC 6 Clicks Score (PT) 11  -PH     Highest Level of Mobility Goal 4 --> Transfer to chair/commode  -PH       Row Name 02/02/24 1035          Functional Assessment    Outcome Measure Options AM-PAC 6 Clicks Basic Mobility (PT)  -PH               User Key  (r) = Recorded By, (t) = Taken By, (c) = Cosigned By      Initials Name Provider Type    PH Joana Pimentel PTA Physical Therapist Assistant                                 Physical Therapy Education       Title: PT OT SLP Therapies (Done)       Topic: Physical Therapy (Done)       Point: Mobility training (Done)       Learning Progress Summary             Patient Acceptance, E,TB,D, VU,NR by  at 2/2/2024 1035    Acceptance, E,TB, VU,NR,DU by  at 2/1/2024 1423    Acceptance, E,TB, VU,DU,NR by  at 1/30/2024 1002    Acceptance, E,TB, VU,NR by [facility] at 1/28/2024 0848                         Point: Home exercise program (Done)       Learning Progress Summary             Patient Acceptance, E,TB,D, VU,NR by  at 2/2/2024 1035    Acceptance, E,TB, VU,NR,DU by  at 2/1/2024 1423    Acceptance, E,TB, VU,DU,NR by  at 1/30/2024 1002    Acceptance, E,TB, VU,NR by [facility] at 1/28/2024 0848                         Point: Body mechanics (Done)       Learning Progress Summary             Patient Acceptance, E,TB,D, VU,NR by  at 2/2/2024 1035    Acceptance, E,TB, VU,NR,DU by  at 2/1/2024 1423    Acceptance, E,TB, VU,DU,NR by  at 1/30/2024 1002    Acceptance, E,TB, VU,NR by [facility] at 1/28/2024 0848                         Point: Precautions (Done)       Learning Progress  Summary             Patient Acceptance, E,TB,D, VU,NR by  at 2/2/2024 1035    Acceptance, E,TB, VU,NR,DU by  at 2/1/2024 1423    Acceptance, E,TB, VU,DU,NR by  at 1/30/2024 1002    Acceptance, E,TB, VU,NR by [facility] at 1/28/2024 0848                                         User Key       Initials Effective Dates Name Provider Type Discipline    [facility] 07/11/23 -  Bakari Lerma PT Physical Therapist PT     06/16/21 -  Joana Pimentel PTA Physical Therapist Assistant PT     09/22/22 -  Lizzy Waterman PT Physical Therapist PT                  PT Recommendation and Plan     Plan of Care Reviewed With: patient  Progress: no change  Outcome Evaluation: Pt continues to be agreeable to PT tx. Pt was very anxious and given emotional support. RN notified of pt request for anxiety meds.  Pt was in bed at sat up to EOB w/ mod A. Pt then stood 3x from EOB req mod/max a x 2 . Pt's longest stand was approx 1 min req min A x 2 w /use of fww. Pt hips remained in flexion in spite of several cues for postural correction. Pt amb approx 3' to chair req CGA/min A x 2 and use of fww. Pt was slow w/ no overt LOB. Pain in B feet/LE as well as activity intolerance limiting distance. PT will prog as pt suzanne.     Time Calculation:         PT Charges       Row Name 02/02/24 1036             Time Calculation    Start Time 0921  -PH      Stop Time 0951  -PH      Time Calculation (min) 30 min  -PH      PT Received On 02/02/24  -PH      PT - Next Appointment 02/05/24  -PH         Timed Charges    43700 - PT Therapeutic Exercise Minutes 5  -PH      45337 - PT Therapeutic Activity Minutes 25  -PH         Total Minutes    Timed Charges Total Minutes 30  -PH       Total Minutes 30  -PH                User Key  (r) = Recorded By, (t) = Taken By, (c) = Cosigned By      Initials Name Provider Type     Joana Pimentel PTA Physical Therapist Assistant                  Therapy Charges for Today       Code Description Service Date  Service Provider Modifiers Qty    11477579719  PT THERAPEUTIC ACT EA 15 MIN 2/1/2024 Joana Pimentel, PTA GP 2    89698297373 HC PT THERAPEUTIC ACT EA 15 MIN 2/2/2024 Joana Pimentel, PTA GP 2    [id number]  PT THER SUPP EA 15 MIN 2/2/2024 Joana Pimentel, PTA GP 1            PT G-Codes  Outcome Measure Options: AM-PAC 6 Clicks Basic Mobility (PT)  AM-PAC 6 Clicks Score (PT): 11  AM-PAC 6 Clicks Score (OT): 12  PT Discharge Summary  Anticipated Discharge Disposition (PT): skilled nursing facility    Joana Pimentel PTA  2/2/2024

## 2024-02-02 NOTE — PROGRESS NOTES
"    Name: Halie Guidry ADMIT: 2024   : 1940  PCP: Luis Felipe Flowers MD    MRN: [id number] LOS: 6 days   AGE/SEX: 83 y.o. female  ROOM: [facility]     Subjective   Subjective   Patient seen and examined this morning.  Hospital day 6.  Patient sitting up in chair next to bedside, she states that she feels anxious, like she is \"having panic attacks\" today, feels like it is likely result of prolonged hospital stay, abdominal pain improving, no diarrhea since yesterday.        Objective   Objective   Vital Signs  Temp:  [97.3 °F (36.3 °C)-97.7 °F (36.5 °C)] 97.3 °F (36.3 °C)  Heart Rate:  [65-70] 65  Resp:  [18] 18  BP: (157-177)/(58-73) 157/58  SpO2:  [95 %-98 %] 98 %  on  Flow (L/min):  [1] 1;   Device (Oxygen Therapy): room air  Body mass index is 33.25 kg/m².  Physical Exam  Vitals and nursing note reviewed.   Constitutional:       General: She is not in acute distress.     Appearance: She is ill-appearing.   Cardiovascular:      Rate and Rhythm: Normal rate and regular rhythm.      Pulses: Normal pulses.      Heart sounds: Normal heart sounds.   Pulmonary:      Effort: Pulmonary effort is normal. No respiratory distress.      Breath sounds: Normal breath sounds.   Abdominal:      General: Bowel sounds are normal. There is no distension.      Palpations: Abdomen is soft.      Tenderness: There is abdominal tenderness. There is no guarding or rebound.   Skin:     General: Skin is warm and dry.      Coloration: Skin is pale.   Neurological:      Mental Status: She is alert.   Psychiatric:         Mood and Affect: Mood is anxious.       Results Review     I reviewed the patient's new clinical results.  Results from last 7 days   Lab Units 24  04324  0421 24  0450 24  0513   WBC 10*3/mm3 19.35* 23.30* 23.86* 23.88*   HEMOGLOBIN g/dL 9.8* 10.4* 11.3* 10.2*   PLATELETS 10*3/mm3 260 293 327 311     Results from last 7 days   Lab Units 24  0439 24  1453 24  0421 " 01/31/24 0450 01/30/24  1501 01/30/24  0513   SODIUM mmol/L 140  --  142 142  --  143   POTASSIUM mmol/L 4.0 3.9 3.2* 3.3*   < > 2.9*   CHLORIDE mmol/L 109*  --  111* 110*  --  110*   CO2 mmol/L 21.0*  --  21.0* 18.6*  --  21.0*   BUN mg/dL 17  --  22 34*  --  48*   CREATININE mg/dL 1.27*  --  1.36* 1.41*  --  2.20*   GLUCOSE mg/dL 211*  --  224* 189*  --  85   EGFR mL/min/1.73 42.0*  --  38.7* 37.1*  --  21.7*    < > = values in this interval not displayed.     Results from last 7 days   Lab Units 02/02/24 0439 02/01/24 0421 01/31/24 0450 01/30/24  0513 01/29/24  0504 01/27/24  1615   ALBUMIN g/dL 2.2* 2.0* 2.2* 2.1*   < > 3.1*   BILIRUBIN mg/dL  --   --  0.3 <0.2  --  0.3   ALK PHOS U/L  --   --  93 115  --  101   AST (SGOT) U/L  --   --  19 18  --  23   ALT (SGPT) U/L  --   --  7 11  --  13    < > = values in this interval not displayed.     Results from last 7 days   Lab Units 02/02/24 0439 02/01/24 0421 01/31/24  0450 01/30/24  0513 01/29/24  0504   CALCIUM mg/dL 7.6* 7.7* 7.4* 7.5* 7.5*   ALBUMIN g/dL 2.2* 2.0* 2.2* 2.1* 2.4*   MAGNESIUM mg/dL 2.0 1.6 2.0 1.7 2.0   PHOSPHORUS mg/dL 2.4* 2.6 2.7  --  4.8*     Results from last 7 days   Lab Units 01/27/24  1818 01/27/24  1615   PROCALCITONIN ng/mL  --  0.79*   LACTATE mmol/L 1.6 2.4*     Glucose   Date/Time Value Ref Range Status   02/02/2024 1158 239 (H) 70 - 130 mg/dL Final   02/02/2024 0814 254 (H) 70 - 130 mg/dL Final   02/01/2024 1943 247 (H) 70 - 130 mg/dL Final   02/01/2024 1748 219 (H) 70 - 130 mg/dL Final   02/01/2024 1230 239 (H) 70 - 130 mg/dL Final   02/01/2024 0836 244 (H) 70 - 130 mg/dL Final   01/31/2024 2114 178 (H) 70 - 130 mg/dL Final       No radiology results for the last day    I have personally reviewed all medications:  Scheduled Medications  atorvastatin, 80 mg, Oral, Nightly  bisoprolol, 5 mg, Oral, Daily  castor oil-balsam peru, 1 Application, Topical, Q12H  famotidine, 20 mg, Oral, Daily  fidaxomicin, 200 mg, Oral, Q12H  insulin  glargine, 12 Units, Subcutaneous, Nightly  insulin lispro, 2 Units, Subcutaneous, TID With Meals  insulin lispro, 2-7 Units, Subcutaneous, 4x Daily AC & at Bedtime  levothyroxine, 112 mcg, Oral, Once per day on Monday Tuesday Wednesday Thursday Friday Saturday  levothyroxine, 168 mcg, Oral, Weekly  Menthol-Zinc Oxide, 1 Application, Topical, 4x Daily  multivitamin with minerals, 1 tablet, Oral, Daily  pregabalin, 50 mg, Oral, Daily  cyanocobalamin, 1,000 mcg, Oral, Daily    Infusions  Pharmacy Consult,   Pharmacy Consult,     Diet  Diet: Cardiac Diets; Healthy Heart (2-3 Na+); Texture: Regular Texture (IDDSI 7); Fluid Consistency: Thin (IDDSI 0)    I have personally reviewed:  [x]  Laboratory   [x]  Microbiology   [x]  Radiology   [x]  EKG/Telemetry  [x]  Cardiology/Vascular   []  Pathology    []  Records       Assessment/Plan     Active Hospital Problems    Diagnosis  POA    **Sepsis [A41.9]  Yes    Stage 3a chronic kidney disease [N18.31]  Yes    Metabolic encephalopathy [G93.41]  Yes    Pressure injury of buttock, stage 2 [L89.302]  Yes    C. difficile diarrhea [A04.72]  Yes    Neuropathic pain [M79.2]  Yes    Acute UTI (urinary tract infection) [N39.0]  Yes    VIPUL (acute kidney injury) [N17.9]  Yes    Seizure [R56.9]  Yes    Hyperlipidemia [E78.5]  Yes    Type 2 diabetes mellitus with hyperglycemia, with long-term current use of insulin [E11.65, Z79.4]  Not Applicable    Benign essential hypertension [I10]  Yes      Resolved Hospital Problems   No resolved problems to display.       83 y.o. female admitted with Sepsis.    Sepsis  Recurrent C. Difficile colitis  - ID consulted and following. Patient has been on Vancomycin, but Dificid has now been approved on 01/31, so Vancomycin was stopped and Dificid was started (01/31/24), she remains on this at present. WBC remains elevated, but improving from prior.  Symptoms improving from prior.  ID plans for 10-day course, and after completion, ID recommending daily  probiotic x 3 months. Discussed with CCP, she will not be able to take this medication at rehab, so will likely have to stay here to complete antibiotic course.   - Will continue to follow ID plans/recommendations. Greatly appreciate their help.  - Order repeat CBC in AM for reassessment of WBC count.    Acute kidney injury  - Etiology most likely pre-renal in nature, due to volume depletion, decreased PO intake, impaired autoregulation from Lisinopril. Nephrology following, creatinine peaked at 4.99, has received IVF, which has led to improvement.  - Will continue to follow nephrology their plans/recommendations. Greatly appreciate their help.  - Order repeat BMP in AM for reassessment.    Type 2 diabetes with hyperglycemia  - Most recent A1c: 8.20% (12/24/23).  - Current treatment regimen: Glargine 12 units nightly, lispro 2 units TID with meals, correctional SSI; Glucose uncontrolled, warrants adjustment.  - Increase glargine to 15 units nightly, increase lispro to 5 units TID with meals, continue correctional SSI.  - Will monitor 24 hour insulin requirements and adjust as needed to achieve target inpatient range of 140-180.    Hypertension  - Blood pressure appears stable and acceptable acutely at this time. No indications to warrant acute changes/intervention at this time.  - Continue current medications as prescribed. Trend BP to guide ongoing management decisions.    Anemia  - Hemoglobin low on most recent labs, however, stable from prior. No evidence of overt blood loss. No indications for acute intervention at this time.    - Order repeat CBC in AM for reassessment. Continue to monitor, transfuse for hemoglobin <7.    Anxiety  - Endorsed by patient this morning. Will add PRN Hydroxyzine and monitor response.    Hypothyroidism  - Continue levothyroxine as prescribed.    Hypokalemia  - Noted on prior labs, improved on most recent testing.    Hypophosphatemia  - Noted on most recent labs, repletion per  protocol if okay with Nephrology.    Hyperlipidemia  - Continue Statin as prescribed.      SCDs for DVT prophylaxis.  Limited code (no CPR, no intubation).  Discussed with patient, nursing staff, CCP, and care team on multidisciplinary rounds.  Anticipate discharge to SNU facility next week.      Zachariah Ivory DO  [facility]  02/02/24

## 2024-02-02 NOTE — PROGRESS NOTES
[facility] Progress Note      Patient Name: Halie Guidry  : 1940  MRN: [id number]  Primary Care Physician:  Luis Felipe Flowers MD  Date of admission: 2024    Subjective     Interval History:   Diarrhea resolved; no abdominal pain  Feels anxious; eager to leave the hospital  No shortness of breath on room air  UOP not measured    Review of Systems:   As noted above    Objective     Vitals:   Temp:  [97.3 °F (36.3 °C)-97.7 °F (36.5 °C)] 97.3 °F (36.3 °C)  Heart Rate:  [65-70] 65  Resp:  [18] 18  BP: (157-177)/(58-73) 157/58  Flow (L/min):  [1] 1    Intake/Output Summary (Last 24 hours) at 2024 3754  Last data filed at 2024 0814  Gross per 24 hour   Intake 0 ml   Output --   Net 0 ml         Physical Exam:    General Appearance: alert, appropriate, chronically ill, overweight  Skin: warm and dry  HEENT: nonicteric sclera, dry MM  Neck: supple, no JVD  Lungs: Coarse, but no wheezing; not labored  Heart: RRR, normal S1 and S2  Abdomen: soft, nontender, nondistended, BS +  : no palpable bladder  Extremities: +1-2 edema, cyanosis or clubbing  Neuro: Moves all extremities    Scheduled Meds:     atorvastatin, 80 mg, Oral, Nightly  bisoprolol, 5 mg, Oral, Daily  castor oil-balsam peru, 1 Application, Topical, Q12H  famotidine, 20 mg, Oral, Daily  fidaxomicin, 200 mg, Oral, Q12H  insulin glargine, 15 Units, Subcutaneous, Nightly  insulin lispro, 2-7 Units, Subcutaneous, 4x Daily AC & at Bedtime  insulin lispro, 5 Units, Subcutaneous, TID With Meals  levothyroxine, 112 mcg, Oral, Once per day on   levothyroxine, 168 mcg, Oral, Weekly  Menthol-Zinc Oxide, 1 Application, Topical, 4x Daily  multivitamin with minerals, 1 tablet, Oral, Daily  pregabalin, 50 mg, Oral, Daily  cyanocobalamin, 1,000 mcg, Oral, Daily      IV Meds:   Pharmacy Consult,   Pharmacy Consult,         Results Reviewed:   I have personally reviewed the  results from the time of this admission to 2/2/2024 17:39 EST     Results from last 7 days   Lab Units 02/02/24  0439 02/01/24  1453 02/01/24 0421 01/31/24  0450 01/30/24  1501 01/30/24  0513 01/28/24  0928 01/27/24  1615   SODIUM mmol/L 140  --  142 142  --  143   < > 137   POTASSIUM mmol/L 4.0 3.9 3.2* 3.3*   < > 2.9*   < > 4.5   CHLORIDE mmol/L 109*  --  111* 110*  --  110*   < > 104   CO2 mmol/L 21.0*  --  21.0* 18.6*  --  21.0*   < > 14.0*   BUN mg/dL 17  --  22 34*  --  48*   < > 68*   CREATININE mg/dL 1.27*  --  1.36* 1.41*  --  2.20*   < > 4.99*   CALCIUM mg/dL 7.6*  --  7.7* 7.4*  --  7.5*   < > 9.0   BILIRUBIN mg/dL  --   --   --  0.3  --  <0.2  --  0.3   ALK PHOS U/L  --   --   --  93  --  115  --  101   ALT (SGPT) U/L  --   --   --  7  --  11  --  13   AST (SGOT) U/L  --   --   --  19  --  18  --  23   GLUCOSE mg/dL 211*  --  224* 189*  --  85   < > 173*    < > = values in this interval not displayed.       Estimated Creatinine Clearance: 41.3 mL/min (A) (by C-G formula based on SCr of 1.27 mg/dL (H)).    Results from last 7 days   Lab Units 02/02/24 0439 02/01/24 0421 01/31/24  0450   MAGNESIUM mg/dL 2.0 1.6 2.0   PHOSPHORUS mg/dL 2.4* 2.6 2.7             Results from last 7 days   Lab Units 02/02/24  0439 02/01/24 0421 01/31/24  0450 01/30/24  0513 01/29/24  0504   WBC 10*3/mm3 19.35* 23.30* 23.86* 23.88* 22.24*   HEMOGLOBIN g/dL 9.8* 10.4* 11.3* 10.2* 10.8*   PLATELETS 10*3/mm3 260 293 327 311 338             Assessment / Plan     ASSESSMENT:  1.  VIPUL, nonoliguric, steadily improving:  prerenal due to volume depletion from diarrhea and poor appetite, with limited renal autoregulation due to lisinopril.  Low potassium and magnesium in face of poor nutrition and diarrhea; likely NAGMA due to diarrhea and VIPUL.  Edema worsening.  Currently on LR.  2.  Toe ulcers both feet and sacral decubitus  3.  Recurrent C. difficile colitis  4.  DM2  5.  Anemia  6.  Recent COVID infection      PLAN:  1.  Resume  tomorrow furosemide at 20 mg twice daily  2.  Sodium phosphate IV (will avoid oral phosphorus preparations, which usually cause diarrhea)  3.  Surveillance labs    Thank you for involving us in the care of Halie Guidry.  Please feel free to call with any questions.    Derrick Murry MD  02/02/24  17:39 [facility]    [facility]  [phone]    Please note that portions of this note were completed with a voice recognition program.

## 2024-02-03 LAB
ALBUMIN SERPL-MCNC: 2.1 G/DL (ref 3.5–5.2)
ANION GAP SERPL CALCULATED.3IONS-SCNC: 11.9 MMOL/L (ref 5–15)
ANISOCYTOSIS BLD QL: ABNORMAL
BUN SERPL-MCNC: 16 MG/DL (ref 8–23)
BUN/CREAT SERPL: 16.3 (ref 7–25)
CALCIUM SPEC-SCNC: 7.6 MG/DL (ref 8.6–10.5)
CHLORIDE SERPL-SCNC: 107 MMOL/L (ref 98–107)
CO2 SERPL-SCNC: 21.1 MMOL/L (ref 22–29)
CREAT SERPL-MCNC: 0.98 MG/DL (ref 0.57–1)
DEPRECATED RDW RBC AUTO: 48.6 FL (ref 37–54)
EGFRCR SERPLBLD CKD-EPI 2021: 57.4 ML/MIN/1.73
ELLIPTOCYTES BLD QL SMEAR: ABNORMAL
EOSINOPHIL # BLD MANUAL: 0.18 10*3/MM3 (ref 0–0.4)
EOSINOPHIL NFR BLD MANUAL: 1 % (ref 0.3–6.2)
ERYTHROCYTE [DISTWIDTH] IN BLOOD BY AUTOMATED COUNT: 15.7 % (ref 12.3–15.4)
GLUCOSE BLDC GLUCOMTR-MCNC: 192 MG/DL (ref 70–130)
GLUCOSE BLDC GLUCOMTR-MCNC: 197 MG/DL (ref 70–130)
GLUCOSE BLDC GLUCOMTR-MCNC: 231 MG/DL (ref 70–130)
GLUCOSE BLDC GLUCOMTR-MCNC: 237 MG/DL (ref 70–130)
GLUCOSE BLDC GLUCOMTR-MCNC: 250 MG/DL (ref 70–130)
GLUCOSE BLDC GLUCOMTR-MCNC: 258 MG/DL (ref 70–130)
GLUCOSE SERPL-MCNC: 187 MG/DL (ref 65–99)
HCT VFR BLD AUTO: 31.8 % (ref 34–46.6)
HGB BLD-MCNC: 10.1 G/DL (ref 12–15.9)
LYMPHOCYTES # BLD MANUAL: 1.08 10*3/MM3 (ref 0.7–3.1)
LYMPHOCYTES NFR BLD MANUAL: 9.2 % (ref 5–12)
MAGNESIUM SERPL-MCNC: 1.8 MG/DL (ref 1.6–2.4)
MCH RBC QN AUTO: 27.3 PG (ref 26.6–33)
MCHC RBC AUTO-ENTMCNC: 31.8 G/DL (ref 31.5–35.7)
MCV RBC AUTO: 85.9 FL (ref 79–97)
MICROCYTES BLD QL: ABNORMAL
MONOCYTES # BLD: 1.62 10*3/MM3 (ref 0.1–0.9)
NEUTROPHILS # BLD AUTO: 14.78 10*3/MM3 (ref 1.7–7)
NEUTROPHILS NFR BLD MANUAL: 83.7 % (ref 42.7–76)
NRBC BLD AUTO-RTO: 0.2 /100 WBC (ref 0–0.2)
OVALOCYTES BLD QL SMEAR: ABNORMAL
PHOSPHATE SERPL-MCNC: 3.9 MG/DL (ref 2.5–4.5)
PLAT MORPH BLD: NORMAL
PLATELET # BLD AUTO: 256 10*3/MM3 (ref 140–450)
PMV BLD AUTO: 10.3 FL (ref 6–12)
POIKILOCYTOSIS BLD QL SMEAR: ABNORMAL
POTASSIUM SERPL-SCNC: 3.7 MMOL/L (ref 3.5–5.2)
RBC # BLD AUTO: 3.7 10*6/MM3 (ref 3.77–5.28)
SODIUM SERPL-SCNC: 140 MMOL/L (ref 136–145)
VARIANT LYMPHS NFR BLD MANUAL: 6.1 % (ref 19.6–45.3)
WBC MORPH BLD: NORMAL
WBC NRBC COR # BLD AUTO: 17.66 10*3/MM3 (ref 3.4–10.8)

## 2024-02-03 PROCEDURE — 63710000001 INSULIN LISPRO (HUMAN) PER 5 UNITS: Performed by: STUDENT IN AN ORGANIZED HEALTH CARE EDUCATION/TRAINING PROGRAM

## 2024-02-03 PROCEDURE — 63710000001 INSULIN GLARGINE PER 5 UNITS: Performed by: STUDENT IN AN ORGANIZED HEALTH CARE EDUCATION/TRAINING PROGRAM

## 2024-02-03 PROCEDURE — 85007 BL SMEAR W/DIFF WBC COUNT: CPT | Performed by: STUDENT IN AN ORGANIZED HEALTH CARE EDUCATION/TRAINING PROGRAM

## 2024-02-03 PROCEDURE — 82948 REAGENT STRIP/BLOOD GLUCOSE: CPT

## 2024-02-03 PROCEDURE — 80069 RENAL FUNCTION PANEL: CPT | Performed by: INTERNAL MEDICINE

## 2024-02-03 PROCEDURE — 83735 ASSAY OF MAGNESIUM: CPT | Performed by: INTERNAL MEDICINE

## 2024-02-03 PROCEDURE — 63710000001 INSULIN LISPRO (HUMAN) PER 5 UNITS: Performed by: INTERNAL MEDICINE

## 2024-02-03 PROCEDURE — 85025 COMPLETE CBC W/AUTO DIFF WBC: CPT | Performed by: STUDENT IN AN ORGANIZED HEALTH CARE EDUCATION/TRAINING PROGRAM

## 2024-02-03 RX ORDER — INSULIN LISPRO 100 [IU]/ML
7 INJECTION, SOLUTION INTRAVENOUS; SUBCUTANEOUS
Status: DISCONTINUED | OUTPATIENT
Start: 2024-02-03 | End: 2024-02-04

## 2024-02-03 RX ADMIN — INSULIN LISPRO 7 UNITS: 100 INJECTION, SOLUTION INTRAVENOUS; SUBCUTANEOUS at 18:42

## 2024-02-03 RX ADMIN — INSULIN LISPRO 2 UNITS: 100 INJECTION, SOLUTION INTRAVENOUS; SUBCUTANEOUS at 21:12

## 2024-02-03 RX ADMIN — ATORVASTATIN CALCIUM 80 MG: 80 TABLET, FILM COATED ORAL at 21:13

## 2024-02-03 RX ADMIN — INSULIN LISPRO 5 UNITS: 100 INJECTION, SOLUTION INTRAVENOUS; SUBCUTANEOUS at 13:00

## 2024-02-03 RX ADMIN — INSULIN LISPRO 5 UNITS: 100 INJECTION, SOLUTION INTRAVENOUS; SUBCUTANEOUS at 09:49

## 2024-02-03 RX ADMIN — INSULIN LISPRO 4 UNITS: 100 INJECTION, SOLUTION INTRAVENOUS; SUBCUTANEOUS at 09:49

## 2024-02-03 RX ADMIN — FAMOTIDINE 20 MG: 20 TABLET, FILM COATED ORAL at 09:49

## 2024-02-03 RX ADMIN — CASTOR OIL AND BALSAM, PERU 1 APPLICATION: 788; 87 OINTMENT TOPICAL at 09:48

## 2024-02-03 RX ADMIN — ANORECTAL OINTMENT 1 APPLICATION: 15.7; .44; 24; 20.6 OINTMENT TOPICAL at 09:51

## 2024-02-03 RX ADMIN — HYDROCODONE BITARTRATE AND ACETAMINOPHEN 1 TABLET: 10; 325 TABLET ORAL at 06:25

## 2024-02-03 RX ADMIN — INSULIN GLARGINE 18 UNITS: 100 INJECTION, SOLUTION SUBCUTANEOUS at 21:11

## 2024-02-03 RX ADMIN — Medication 3 MG: at 21:13

## 2024-02-03 RX ADMIN — FIDAXOMICIN 200 MG: 200 TABLET, FILM COATED ORAL at 21:11

## 2024-02-03 RX ADMIN — FIDAXOMICIN 200 MG: 200 TABLET, FILM COATED ORAL at 09:49

## 2024-02-03 RX ADMIN — PREGABALIN 50 MG: 50 CAPSULE ORAL at 09:54

## 2024-02-03 RX ADMIN — LEVOTHYROXINE SODIUM 112 MCG: 112 TABLET ORAL at 06:23

## 2024-02-03 RX ADMIN — ANORECTAL OINTMENT 1 APPLICATION: 15.7; .44; 24; 20.6 OINTMENT TOPICAL at 21:12

## 2024-02-03 RX ADMIN — INSULIN LISPRO 3 UNITS: 100 INJECTION, SOLUTION INTRAVENOUS; SUBCUTANEOUS at 18:42

## 2024-02-03 RX ADMIN — CASTOR OIL AND BALSAM, PERU 1 APPLICATION: 788; 87 OINTMENT TOPICAL at 21:18

## 2024-02-03 RX ADMIN — Medication 1 TABLET: at 09:49

## 2024-02-03 RX ADMIN — Medication 1000 MCG: at 09:49

## 2024-02-03 RX ADMIN — BISOPROLOL FUMARATE 5 MG: 5 TABLET, FILM COATED ORAL at 09:49

## 2024-02-03 RX ADMIN — ANORECTAL OINTMENT 1 APPLICATION: 15.7; .44; 24; 20.6 OINTMENT TOPICAL at 13:00

## 2024-02-03 RX ADMIN — INSULIN LISPRO 2 UNITS: 100 INJECTION, SOLUTION INTRAVENOUS; SUBCUTANEOUS at 12:59

## 2024-02-03 RX ADMIN — HYDROCODONE BITARTRATE AND ACETAMINOPHEN 1 TABLET: 10; 325 TABLET ORAL at 23:51

## 2024-02-03 RX ADMIN — ANORECTAL OINTMENT 1 APPLICATION: 15.7; .44; 24; 20.6 OINTMENT TOPICAL at 18:42

## 2024-02-03 RX ADMIN — HYDROXYZINE HYDROCHLORIDE 10 MG: 10 TABLET ORAL at 09:49

## 2024-02-03 RX ADMIN — HYDROXYZINE HYDROCHLORIDE 10 MG: 10 TABLET ORAL at 21:13

## 2024-02-03 NOTE — PLAN OF CARE
Problem: Skin Injury Risk Increased  Goal: Skin Health and Integrity  Outcome: Ongoing, Progressing     Problem: Fall Injury Risk  Goal: Absence of Fall and Fall-Related Injury  Outcome: Ongoing, Progressing   Goal Outcome Evaluation:           Progress: improving  Outcome Evaluation: Pt appeared to sleep well, alert and oriented x4, coop with care, voiding per purewick, no bm this shift, repositioned, heels elevated off bed, medicated for pain and anxiety

## 2024-02-03 NOTE — PLAN OF CARE
Goal Outcome Evaluation:  Plan of Care Reviewed With: patient        Progress: improving  Outcome Evaluation: VSS. Pt c/o anxiety attarax given. Wound care done. Heels floated on pillows. Pt up in chair. Pt had BM today. Pt voiding. Will continue to monitor.

## 2024-02-03 NOTE — PROGRESS NOTES
Name: Halie Guidry ADMIT: 2024   : 1940  PCP: Luis Felipe Flowers MD    MRN: [id number] LOS: 7 days   AGE/SEX: 83 y.o. female  ROOM: [facility]     Subjective   Subjective   Patient seen and examined this morning.  Hospital day 7.  She is resting in bed, abdominal and diarrhea both improving. Anxiety improved.        Objective   Objective   Vital Signs  Temp:  [97.3 °F (36.3 °C)-97.7 °F (36.5 °C)] 97.7 °F (36.5 °C)  Heart Rate:  [64-70] 70  Resp:  [18] 18  BP: (156-177)/(51-68) 177/68  SpO2:  [95 %-99 %] 95 %  on   ;   Device (Oxygen Therapy): room air  Body mass index is 33.19 kg/m².  Physical Exam  Vitals and nursing note reviewed.   Constitutional:       General: She is not in acute distress.     Appearance: She is ill-appearing.   Cardiovascular:      Rate and Rhythm: Normal rate and regular rhythm.      Pulses: Normal pulses.      Heart sounds: Normal heart sounds.   Pulmonary:      Effort: Pulmonary effort is normal. No respiratory distress.      Breath sounds: Normal breath sounds.   Abdominal:      General: Bowel sounds are normal. There is no distension.      Palpations: Abdomen is soft.      Tenderness: There is no abdominal tenderness. There is no guarding or rebound.   Skin:     General: Skin is warm and dry.      Coloration: Skin is pale.   Neurological:      Mental Status: She is alert.   Psychiatric:         Mood and Affect: Mood is anxious.       Results Review     I reviewed the patient's new clinical results.  Results from last 7 days   Lab Units 24  0405 24  0439 24  0421 24  0450   WBC 10*3/mm3 17.66* 19.35* 23.30* 23.86*   HEMOGLOBIN g/dL 10.1* 9.8* 10.4* 11.3*   PLATELETS 10*3/mm3 256 260 293 327     Results from last 7 days   Lab Units 24  0405 24  0439 24  1453 24  0421 24  0450   SODIUM mmol/L 140 140  --  142 142   POTASSIUM mmol/L 3.7 4.0 3.9 3.2* 3.3*   CHLORIDE mmol/L 107 109*  --  111* 110*   CO2 mmol/L 21.1* 21.0*   --  21.0* 18.6*   BUN mg/dL 16 17  --  22 34*   CREATININE mg/dL 0.98 1.27*  --  1.36* 1.41*   GLUCOSE mg/dL 187* 211*  --  224* 189*   EGFR mL/min/1.73 57.4* 42.0*  --  38.7* 37.1*     Results from last 7 days   Lab Units 02/03/24  0405 02/02/24  0439 02/01/24 0421 01/31/24  0450 01/30/24  0513 01/29/24  0504 01/27/24  1615   ALBUMIN g/dL 2.1* 2.2* 2.0* 2.2* 2.1*   < > 3.1*   BILIRUBIN mg/dL  --   --   --  0.3 <0.2  --  0.3   ALK PHOS U/L  --   --   --  93 115  --  101   AST (SGOT) U/L  --   --   --  19 18  --  23   ALT (SGPT) U/L  --   --   --  7 11  --  13    < > = values in this interval not displayed.     Results from last 7 days   Lab Units 02/03/24 0405 02/02/24 0439 02/01/24  0421 01/31/24  0450   CALCIUM mg/dL 7.6* 7.6* 7.7* 7.4*   ALBUMIN g/dL 2.1* 2.2* 2.0* 2.2*   MAGNESIUM mg/dL 1.8 2.0 1.6 2.0   PHOSPHORUS mg/dL 3.9 2.4* 2.6 2.7     Results from last 7 days   Lab Units 01/27/24  1818 01/27/24  1615   PROCALCITONIN ng/mL  --  0.79*   LACTATE mmol/L 1.6 2.4*     Glucose   Date/Time Value Ref Range Status   02/03/2024 0827 258 (H) 70 - 130 mg/dL Final   02/03/2024 0826 231 (H) 70 - 130 mg/dL Final   02/03/2024 0825 250 (H) 70 - 130 mg/dL Final   02/02/2024 2045 259 (H) 70 - 130 mg/dL Final   02/02/2024 1705 311 (H) 70 - 130 mg/dL Final   02/02/2024 1158 239 (H) 70 - 130 mg/dL Final   02/02/2024 0814 254 (H) 70 - 130 mg/dL Final       No radiology results for the last day    I have personally reviewed all medications:  Scheduled Medications  atorvastatin, 80 mg, Oral, Nightly  bisoprolol, 5 mg, Oral, Daily  castor oil-balsam peru, 1 Application, Topical, Q12H  famotidine, 20 mg, Oral, Daily  fidaxomicin, 200 mg, Oral, Q12H  insulin glargine, 15 Units, Subcutaneous, Nightly  insulin lispro, 2-7 Units, Subcutaneous, 4x Daily AC & at Bedtime  insulin lispro, 5 Units, Subcutaneous, TID With Meals  levothyroxine, 112 mcg, Oral, Once per day on Monday Tuesday Wednesday Thursday Friday Saturday  levothyroxine,  168 mcg, Oral, Weekly  Menthol-Zinc Oxide, 1 Application, Topical, 4x Daily  multivitamin with minerals, 1 tablet, Oral, Daily  pregabalin, 50 mg, Oral, Daily  cyanocobalamin, 1,000 mcg, Oral, Daily    Infusions  Pharmacy Consult,     Diet  Diet: Cardiac Diets, Diabetic Diets; Healthy Heart (2-3 Na+); Consistent Carbohydrate; Texture: Regular Texture (IDDSI 7); Fluid Consistency: Thin (IDDSI 0)    I have personally reviewed:  [x]  Laboratory   [x]  Microbiology   [x]  Radiology   [x]  EKG/Telemetry  [x]  Cardiology/Vascular   []  Pathology    []  Records       Assessment/Plan     Active Hospital Problems    Diagnosis  POA    **Sepsis [A41.9]  Yes    Stage 3a chronic kidney disease [N18.31]  Yes    Metabolic encephalopathy [G93.41]  Yes    Pressure injury of buttock, stage 2 [L89.302]  Yes    C. difficile diarrhea [A04.72]  Yes    Neuropathic pain [M79.2]  Yes    Acute UTI (urinary tract infection) [N39.0]  Yes    VIPUL (acute kidney injury) [N17.9]  Yes    Seizure [R56.9]  Yes    Hyperlipidemia [E78.5]  Yes    Type 2 diabetes mellitus with hyperglycemia, with long-term current use of insulin [E11.65, Z79.4]  Not Applicable    Benign essential hypertension [I10]  Yes      Resolved Hospital Problems   No resolved problems to display.       83 y.o. female admitted with Sepsis.    Sepsis  Recurrent C. Difficile colitis  - ID consulted and has followed. Patient has been on Vancomycin, but Dificid has now been approved on 01/31, so Vancomycin was stopped and Dificid was started (01/31/24), she remains on this at present. WBC remains elevated, but improving from prior.  Symptoms improving from prior.  ID plans for 10-day course, and after completion, ID recommending daily probiotic x 3 months. Discussed with CCP, she will not be able to take this medication at rehab, so will have to stay here to complete antibiotic course. Appreciate ID help, continue medication as prescribed.  - Order repeat CBC in AM for reassessment of  WBC count.    Acute kidney injury  - Etiology most likely pre-renal in nature, due to volume depletion, decreased PO intake, impaired autoregulation from Lisinopril. Nephrology following, creatinine peaked at 4.99, has received IVF, which has led to improvement.  - Will continue to follow nephrology their plans/recommendations. Greatly appreciate their help.  - Order repeat BMP in AM for reassessment.    Type 2 diabetes with hyperglycemia  - Most recent A1c: 8.20% (12/24/23).  - Current treatment regimen: Glargine 15 units nightly, lispro 5 units TID with meals, correctional SSI; Glucose uncontrolled, warrants adjustment.  - Increase glargine to 18 units nightly, increase lispro to 7 units TID with meals, continue correctional SSI.  - Will monitor 24 hour insulin requirements and adjust as needed to achieve target inpatient range of 140-180.    Hypertension  - Blood pressure appears stable and acceptable acutely at this time. No indications to warrant acute changes/intervention at this time.  - Continue current medications as prescribed. Trend BP to guide ongoing management decisions.    Anemia  - Hemoglobin low on most recent labs, however, stable from prior. No evidence of overt blood loss. No indications for acute intervention at this time.    - Order repeat CBC in AM for reassessment. Continue to monitor, transfuse for hemoglobin <7.    Anxiety  - Endorsed by patient on 02/02, given PRN Hydroxyzine, which has helped symptoms. Continue as prescribed.     Hypothyroidism  - Continue levothyroxine as prescribed.    Hypokalemia  - Noted on prior labs, improved on most recent testing.    Hypophosphatemia  - Noted on prior labs, improved on most recent testing.,    Hyperlipidemia  - Continue Statin as prescribed.      SCDs for DVT prophylaxis.  Limited code (no CPR, no intubation).  Discussed with patient and nursing staff.  Anticipate discharge to SNU facility next week.      Zachariah Ivory DO  [facility]  02/03/24

## 2024-02-04 LAB
ANION GAP SERPL CALCULATED.3IONS-SCNC: 11 MMOL/L (ref 5–15)
ANISOCYTOSIS BLD QL: ABNORMAL
BUN SERPL-MCNC: 16 MG/DL (ref 8–23)
BUN/CREAT SERPL: 15.7 (ref 7–25)
CALCIUM SPEC-SCNC: 7.6 MG/DL (ref 8.6–10.5)
CHLORIDE SERPL-SCNC: 108 MMOL/L (ref 98–107)
CO2 SERPL-SCNC: 23 MMOL/L (ref 22–29)
CREAT SERPL-MCNC: 1.02 MG/DL (ref 0.57–1)
DEPRECATED RDW RBC AUTO: 49.3 FL (ref 37–54)
EGFRCR SERPLBLD CKD-EPI 2021: 54.7 ML/MIN/1.73
EOSINOPHIL # BLD MANUAL: 1.05 10*3/MM3 (ref 0–0.4)
EOSINOPHIL NFR BLD MANUAL: 6.2 % (ref 0.3–6.2)
ERYTHROCYTE [DISTWIDTH] IN BLOOD BY AUTOMATED COUNT: 15.9 % (ref 12.3–15.4)
GLUCOSE BLDC GLUCOMTR-MCNC: 160 MG/DL (ref 70–130)
GLUCOSE BLDC GLUCOMTR-MCNC: 174 MG/DL (ref 70–130)
GLUCOSE BLDC GLUCOMTR-MCNC: 185 MG/DL (ref 70–130)
GLUCOSE BLDC GLUCOMTR-MCNC: 226 MG/DL (ref 70–130)
GLUCOSE SERPL-MCNC: 145 MG/DL (ref 65–99)
HCT VFR BLD AUTO: 31.9 % (ref 34–46.6)
HGB BLD-MCNC: 10.2 G/DL (ref 12–15.9)
LYMPHOCYTES # BLD MANUAL: 0.36 10*3/MM3 (ref 0.7–3.1)
LYMPHOCYTES NFR BLD MANUAL: 2.1 % (ref 5–12)
MCH RBC QN AUTO: 27.5 PG (ref 26.6–33)
MCHC RBC AUTO-ENTMCNC: 32 G/DL (ref 31.5–35.7)
MCV RBC AUTO: 86 FL (ref 79–97)
MICROCYTES BLD QL: ABNORMAL
MONOCYTES # BLD: 0.36 10*3/MM3 (ref 0.1–0.9)
NEUTROPHILS # BLD AUTO: 15.18 10*3/MM3 (ref 1.7–7)
NEUTROPHILS NFR BLD MANUAL: 89.7 % (ref 42.7–76)
PLAT MORPH BLD: NORMAL
PLATELET # BLD AUTO: 252 10*3/MM3 (ref 140–450)
PMV BLD AUTO: 9.8 FL (ref 6–12)
POTASSIUM SERPL-SCNC: 3.3 MMOL/L (ref 3.5–5.2)
POTASSIUM SERPL-SCNC: 4.1 MMOL/L (ref 3.5–5.2)
RBC # BLD AUTO: 3.71 10*6/MM3 (ref 3.77–5.28)
SODIUM SERPL-SCNC: 142 MMOL/L (ref 136–145)
VARIANT LYMPHS NFR BLD MANUAL: 2.1 % (ref 19.6–45.3)
WBC MORPH BLD: NORMAL
WBC NRBC COR # BLD AUTO: 16.92 10*3/MM3 (ref 3.4–10.8)

## 2024-02-04 PROCEDURE — 85025 COMPLETE CBC W/AUTO DIFF WBC: CPT | Performed by: STUDENT IN AN ORGANIZED HEALTH CARE EDUCATION/TRAINING PROGRAM

## 2024-02-04 PROCEDURE — 84132 ASSAY OF SERUM POTASSIUM: CPT | Performed by: STUDENT IN AN ORGANIZED HEALTH CARE EDUCATION/TRAINING PROGRAM

## 2024-02-04 PROCEDURE — 63710000001 INSULIN LISPRO (HUMAN) PER 5 UNITS: Performed by: INTERNAL MEDICINE

## 2024-02-04 PROCEDURE — 82948 REAGENT STRIP/BLOOD GLUCOSE: CPT

## 2024-02-04 PROCEDURE — 80048 BASIC METABOLIC PNL TOTAL CA: CPT | Performed by: STUDENT IN AN ORGANIZED HEALTH CARE EDUCATION/TRAINING PROGRAM

## 2024-02-04 PROCEDURE — 85007 BL SMEAR W/DIFF WBC COUNT: CPT | Performed by: STUDENT IN AN ORGANIZED HEALTH CARE EDUCATION/TRAINING PROGRAM

## 2024-02-04 PROCEDURE — 63710000001 INSULIN GLARGINE PER 5 UNITS: Performed by: STUDENT IN AN ORGANIZED HEALTH CARE EDUCATION/TRAINING PROGRAM

## 2024-02-04 PROCEDURE — 63710000001 INSULIN LISPRO (HUMAN) PER 5 UNITS: Performed by: STUDENT IN AN ORGANIZED HEALTH CARE EDUCATION/TRAINING PROGRAM

## 2024-02-04 RX ORDER — POTASSIUM CHLORIDE 750 MG/1
20 TABLET, FILM COATED, EXTENDED RELEASE ORAL DAILY
Status: DISCONTINUED | OUTPATIENT
Start: 2024-02-04 | End: 2024-02-10 | Stop reason: HOSPADM

## 2024-02-04 RX ORDER — INSULIN LISPRO 100 [IU]/ML
9 INJECTION, SOLUTION INTRAVENOUS; SUBCUTANEOUS
Status: DISCONTINUED | OUTPATIENT
Start: 2024-02-04 | End: 2024-02-05

## 2024-02-04 RX ORDER — POTASSIUM CHLORIDE 750 MG/1
40 TABLET, FILM COATED, EXTENDED RELEASE ORAL EVERY 4 HOURS
Status: COMPLETED | OUTPATIENT
Start: 2024-02-04 | End: 2024-02-04

## 2024-02-04 RX ORDER — AMLODIPINE BESYLATE 5 MG/1
5 TABLET ORAL
Status: DISCONTINUED | OUTPATIENT
Start: 2024-02-04 | End: 2024-02-06

## 2024-02-04 RX ORDER — FUROSEMIDE 20 MG/1
20 TABLET ORAL
Status: DISCONTINUED | OUTPATIENT
Start: 2024-02-04 | End: 2024-02-06

## 2024-02-04 RX ADMIN — ANORECTAL OINTMENT 1 APPLICATION: 15.7; .44; 24; 20.6 OINTMENT TOPICAL at 09:03

## 2024-02-04 RX ADMIN — ANORECTAL OINTMENT 1 APPLICATION: 15.7; .44; 24; 20.6 OINTMENT TOPICAL at 17:41

## 2024-02-04 RX ADMIN — HYDROXYZINE HYDROCHLORIDE 10 MG: 10 TABLET ORAL at 21:48

## 2024-02-04 RX ADMIN — POTASSIUM CHLORIDE 40 MEQ: 750 TABLET, EXTENDED RELEASE ORAL at 09:03

## 2024-02-04 RX ADMIN — FAMOTIDINE 20 MG: 20 TABLET, FILM COATED ORAL at 09:04

## 2024-02-04 RX ADMIN — CASTOR OIL AND BALSAM, PERU 1 APPLICATION: 788; 87 OINTMENT TOPICAL at 20:49

## 2024-02-04 RX ADMIN — BISOPROLOL FUMARATE 5 MG: 5 TABLET, FILM COATED ORAL at 09:04

## 2024-02-04 RX ADMIN — INSULIN LISPRO 9 UNITS: 100 INJECTION, SOLUTION INTRAVENOUS; SUBCUTANEOUS at 17:41

## 2024-02-04 RX ADMIN — POTASSIUM CHLORIDE 20 MEQ: 750 TABLET, EXTENDED RELEASE ORAL at 12:56

## 2024-02-04 RX ADMIN — INSULIN LISPRO 7 UNITS: 100 INJECTION, SOLUTION INTRAVENOUS; SUBCUTANEOUS at 09:02

## 2024-02-04 RX ADMIN — INSULIN LISPRO 9 UNITS: 100 INJECTION, SOLUTION INTRAVENOUS; SUBCUTANEOUS at 12:56

## 2024-02-04 RX ADMIN — LEVOTHYROXINE SODIUM 168 MCG: 112 TABLET ORAL at 09:03

## 2024-02-04 RX ADMIN — Medication 1 TABLET: at 09:04

## 2024-02-04 RX ADMIN — PREGABALIN 50 MG: 50 CAPSULE ORAL at 09:04

## 2024-02-04 RX ADMIN — POTASSIUM CHLORIDE 40 MEQ: 750 TABLET, EXTENDED RELEASE ORAL at 12:56

## 2024-02-04 RX ADMIN — ATORVASTATIN CALCIUM 80 MG: 80 TABLET, FILM COATED ORAL at 20:49

## 2024-02-04 RX ADMIN — FIDAXOMICIN 200 MG: 200 TABLET, FILM COATED ORAL at 20:49

## 2024-02-04 RX ADMIN — AMLODIPINE BESYLATE 5 MG: 5 TABLET ORAL at 12:56

## 2024-02-04 RX ADMIN — HYDROXYZINE HYDROCHLORIDE 10 MG: 10 TABLET ORAL at 14:32

## 2024-02-04 RX ADMIN — FIDAXOMICIN 200 MG: 200 TABLET, FILM COATED ORAL at 09:04

## 2024-02-04 RX ADMIN — INSULIN LISPRO 2 UNITS: 100 INJECTION, SOLUTION INTRAVENOUS; SUBCUTANEOUS at 09:02

## 2024-02-04 RX ADMIN — INSULIN LISPRO 3 UNITS: 100 INJECTION, SOLUTION INTRAVENOUS; SUBCUTANEOUS at 20:50

## 2024-02-04 RX ADMIN — HYDROXYZINE HYDROCHLORIDE 10 MG: 10 TABLET ORAL at 09:28

## 2024-02-04 RX ADMIN — CASTOR OIL AND BALSAM, PERU 1 APPLICATION: 788; 87 OINTMENT TOPICAL at 09:03

## 2024-02-04 RX ADMIN — ANORECTAL OINTMENT 1 APPLICATION: 15.7; .44; 24; 20.6 OINTMENT TOPICAL at 13:03

## 2024-02-04 RX ADMIN — ANORECTAL OINTMENT 1 APPLICATION: 15.7; .44; 24; 20.6 OINTMENT TOPICAL at 20:50

## 2024-02-04 RX ADMIN — INSULIN LISPRO 2 UNITS: 100 INJECTION, SOLUTION INTRAVENOUS; SUBCUTANEOUS at 17:40

## 2024-02-04 RX ADMIN — FUROSEMIDE 20 MG: 20 TABLET ORAL at 17:40

## 2024-02-04 RX ADMIN — FUROSEMIDE 20 MG: 20 TABLET ORAL at 12:56

## 2024-02-04 RX ADMIN — HYDROCODONE BITARTRATE AND ACETAMINOPHEN 1 TABLET: 10; 325 TABLET ORAL at 21:48

## 2024-02-04 RX ADMIN — Medication 1000 MCG: at 09:03

## 2024-02-04 RX ADMIN — INSULIN LISPRO 2 UNITS: 100 INJECTION, SOLUTION INTRAVENOUS; SUBCUTANEOUS at 12:56

## 2024-02-04 RX ADMIN — INSULIN GLARGINE 18 UNITS: 100 INJECTION, SOLUTION SUBCUTANEOUS at 20:49

## 2024-02-04 NOTE — PROGRESS NOTES
Nephrology    VIPUL improving steadily; SCR less than 1 mg/dL today  Electrolytes all stable  Will sign off, but please call if any questions    --Derrick Murry MD

## 2024-02-04 NOTE — PLAN OF CARE
Goal Outcome Evaluation:  Plan of Care Reviewed With: patient           Outcome Evaluation: Anxious. PRN med given. Wound treatment done. Heel boots on both feet. No acute changes.

## 2024-02-04 NOTE — PLAN OF CARE
Goal Outcome Evaluation:  Plan of Care Reviewed With: patient        Progress: improving  Outcome Evaluation: VSS. Pt had 2 soft bms today. Pt up in chair. Wound care done. Q2 turn when in bed. Encouraged heel protector boot use. Attarax given x2 for anxiety. Will continue to monitor.

## 2024-02-04 NOTE — PROGRESS NOTES
Name: Halie Guidry ADMIT: 2024   : 1940  PCP: Luis Felipe Flowers MD    MRN: [id number] LOS: 8 days   AGE/SEX: 83 y.o. female  ROOM: [facility]     Subjective   Subjective   Patient seen and examined this morning.  Hospital day 8.  Resting in bed, abdominal pain improving, stools more formed. BP more elevated today.       Objective   Objective   Vital Signs  Temp:  [97.3 °F (36.3 °C)-98.5 °F (36.9 °C)] 97.3 °F (36.3 °C)  Heart Rate:  [64-69] 64  Resp:  [18] 18  BP: (154-182)/(65-80) 182/66  SpO2:  [98 %-100 %] 98 %  on   ;   Device (Oxygen Therapy): room air  Body mass index is 33.45 kg/m².  Physical Exam  Vitals and nursing note reviewed.   Constitutional:       General: She is not in acute distress.     Appearance: She is ill-appearing.   Cardiovascular:      Rate and Rhythm: Normal rate and regular rhythm.      Pulses: Normal pulses.      Heart sounds: Normal heart sounds.   Pulmonary:      Effort: Pulmonary effort is normal. No respiratory distress.      Breath sounds: Normal breath sounds.   Abdominal:      General: Bowel sounds are normal. There is no distension.      Palpations: Abdomen is soft.      Tenderness: There is no abdominal tenderness. There is no guarding or rebound.   Skin:     General: Skin is warm and dry.      Coloration: Skin is pale.   Neurological:      Mental Status: She is alert.       Results Review     I reviewed the patient's new clinical results.  Results from last 7 days   Lab Units 245 24  0439 24  0421   WBC 10*3/mm3 16.92* 17.66* 19.35* 23.30*   HEMOGLOBIN g/dL 10.2* 10.1* 9.8* 10.4*   PLATELETS 10*3/mm3 252 256 260 293     Results from last 7 days   Lab Units 24  0524  0405 24  0439 24  1453 24  0421   SODIUM mmol/L 142 140 140  --  142   POTASSIUM mmol/L 3.3* 3.7 4.0 3.9 3.2*   CHLORIDE mmol/L 108* 107 109*  --  111*   CO2 mmol/L 23.0 21.1* 21.0*  --  21.0*   BUN mg/dL 16 16 17  --  22    CREATININE mg/dL 1.02* 0.98 1.27*  --  1.36*   GLUCOSE mg/dL 145* 187* 211*  --  224*   EGFR mL/min/1.73 54.7* 57.4* 42.0*  --  38.7*     Results from last 7 days   Lab Units 02/03/24 0405 02/02/24 0439 02/01/24 0421 01/31/24  0450 01/30/24  0513   ALBUMIN g/dL 2.1* 2.2* 2.0* 2.2* 2.1*   BILIRUBIN mg/dL  --   --   --  0.3 <0.2   ALK PHOS U/L  --   --   --  93 115   AST (SGOT) U/L  --   --   --  19 18   ALT (SGPT) U/L  --   --   --  7 11     Results from last 7 days   Lab Units 02/04/24 0522 02/03/24 0405 02/02/24 0439 02/01/24 0421 01/31/24  0450   CALCIUM mg/dL 7.6* 7.6* 7.6* 7.7* 7.4*   ALBUMIN g/dL  --  2.1* 2.2* 2.0* 2.2*   MAGNESIUM mg/dL  --  1.8 2.0 1.6 2.0   PHOSPHORUS mg/dL  --  3.9 2.4* 2.6 2.7           Glucose   Date/Time Value Ref Range Status   02/04/2024 0834 160 (H) 70 - 130 mg/dL Final   02/03/2024 2055 192 (H) 70 - 130 mg/dL Final   02/03/2024 1723 237 (H) 70 - 130 mg/dL Final   02/03/2024 1233 197 (H) 70 - 130 mg/dL Final   02/03/2024 0827 258 (H) 70 - 130 mg/dL Final   02/03/2024 0826 231 (H) 70 - 130 mg/dL Final   02/03/2024 0825 250 (H) 70 - 130 mg/dL Final       No radiology results for the last day    I have personally reviewed all medications:  Scheduled Medications  atorvastatin, 80 mg, Oral, Nightly  bisoprolol, 5 mg, Oral, Daily  castor oil-balsam peru, 1 Application, Topical, Q12H  famotidine, 20 mg, Oral, Daily  fidaxomicin, 200 mg, Oral, Q12H  insulin glargine, 18 Units, Subcutaneous, Nightly  insulin lispro, 2-7 Units, Subcutaneous, 4x Daily AC & at Bedtime  insulin lispro, 7 Units, Subcutaneous, TID With Meals  levothyroxine, 112 mcg, Oral, Once per day on Monday Tuesday Wednesday Thursday Friday Saturday  levothyroxine, 168 mcg, Oral, Weekly  Menthol-Zinc Oxide, 1 Application, Topical, 4x Daily  multivitamin with minerals, 1 tablet, Oral, Daily  potassium chloride ER, 40 mEq, Oral, Q4H  pregabalin, 50 mg, Oral, Daily  cyanocobalamin, 1,000 mcg, Oral,  Daily    Infusions  Pharmacy Consult,     Diet  Diet: Cardiac Diets, Diabetic Diets; Healthy Heart (2-3 Na+); Consistent Carbohydrate; Texture: Regular Texture (IDDSI 7); Fluid Consistency: Thin (IDDSI 0)    I have personally reviewed:  [x]  Laboratory   [x]  Microbiology   [x]  Radiology   [x]  EKG/Telemetry  [x]  Cardiology/Vascular   []  Pathology    []  Records       Assessment/Plan     Active Hospital Problems    Diagnosis  POA    **Sepsis [A41.9]  Yes    Stage 3a chronic kidney disease [N18.31]  Yes    Metabolic encephalopathy [G93.41]  Yes    Pressure injury of buttock, stage 2 [L89.302]  Yes    C. difficile diarrhea [A04.72]  Yes    Neuropathic pain [M79.2]  Yes    Acute UTI (urinary tract infection) [N39.0]  Yes    VIPUL (acute kidney injury) [N17.9]  Yes    Seizure [R56.9]  Yes    Hyperlipidemia [E78.5]  Yes    Type 2 diabetes mellitus with hyperglycemia, with long-term current use of insulin [E11.65, Z79.4]  Not Applicable    Benign essential hypertension [I10]  Yes      Resolved Hospital Problems   No resolved problems to display.       83 y.o. female admitted with Sepsis.    Sepsis  Recurrent C. Difficile colitis  - ID consulted and has followed. Patient has been on Vancomycin, but Dificid has now been approved on 01/31, so Vancomycin was stopped and Dificid was started (01/31/24), she remains on this at present. WBC remains elevated, but improving from prior.  Symptoms improving from prior.   - ID plans for 10-day course, and after completion, ID recommending daily probiotic x 3 months. Discussed with CCP, she will not be able to take this medication at rehab, so will have to stay here to complete antibiotic course. Appreciate ID help, continue medication as prescribed.  - Order repeat CBC in AM for reassessment of WBC count.    Acute kidney injury  - Etiology most likely pre-renal in nature, due to volume depletion, decreased PO intake, impaired autoregulation from Lisinopril. Nephrology has followed,  creatinine peaked at 4.99, has received IVF, which has led to improvement. They are signing off (02/04), greatly appreciate their help.  - Order repeat BMP in AM for reassessment. Closely monitor renal function.    Type 2 diabetes with hyperglycemia  - Most recent A1c: 8.20% (12/24/23).  - Current treatment regimen: Glargine 18 units nightly, lispro 7 units TID with meals, correctional SSI; Glucose uncontrolled around mealtimes, warrants adjustment.  - Continue glargine 18 units nightly, increase lispro to 9 units TID with meals, continue correctional SSI.  - Will monitor 24 hour insulin requirements and adjust as needed to achieve target inpatient range of 140-180.    Hypertension  - Blood pressure is elevated, with most recent BP showing SBP >180, however, patient asymptomatic, consistent with urgency, but it is transient elevation.  - (02/04): Discussed with Nephrology, going to resume home furosemide but do 20 mg BID rather than 20 mg daily she was on previously, also going to resume Amlodipine but at lower dose of 5 mg daily rather than 10 she is on at home for now. Adding supplemental potassium in setting of diuretic.  - Monitor patient response to treatment, trend BP to guide ongoing management decisions.  - Continue to hold home Lisinopril.    Anemia  - Hemoglobin low on most recent labs, however, stable from prior. No evidence of overt blood loss. No indications for acute intervention at this time.    - Order repeat CBC in AM for reassessment. Continue to monitor, transfuse for hemoglobin <7.    Hypokalemia  - K low on most recent labs; Will replete via electrolyte protocol. Follow up repeat labs to guide ongoing management decisions. Replete PRN per protocol. Continue to monitor    Anxiety  - Endorsed by patient on 02/02, given PRN Hydroxyzine, which has helped symptoms. Continue as prescribed.     Hypothyroidism  - Continue levothyroxine as prescribed.    Hypophosphatemia  - Noted on prior labs, improved  on most recent testing.,    Hyperlipidemia  - Continue Statin as prescribed.      SCDs for DVT prophylaxis.  Limited code (no CPR, no intubation).  Discussed with patient and nursing staff.  Anticipate discharge to SNU facility next week.      Zachariah Ivory DO  [facility]  02/04/24

## 2024-02-05 LAB
ANION GAP SERPL CALCULATED.3IONS-SCNC: 12.1 MMOL/L (ref 5–15)
BASOPHILS # BLD AUTO: 0.1 10*3/MM3 (ref 0–0.2)
BASOPHILS NFR BLD AUTO: 0.8 % (ref 0–1.5)
BUN SERPL-MCNC: 16 MG/DL (ref 8–23)
BUN/CREAT SERPL: 13.7 (ref 7–25)
CALCIUM SPEC-SCNC: 7.7 MG/DL (ref 8.6–10.5)
CHLORIDE SERPL-SCNC: 108 MMOL/L (ref 98–107)
CO2 SERPL-SCNC: 21.9 MMOL/L (ref 22–29)
CREAT SERPL-MCNC: 1.17 MG/DL (ref 0.57–1)
DEPRECATED RDW RBC AUTO: 49.8 FL (ref 37–54)
EGFRCR SERPLBLD CKD-EPI 2021: 46.4 ML/MIN/1.73
EOSINOPHIL # BLD AUTO: 0.48 10*3/MM3 (ref 0–0.4)
EOSINOPHIL NFR BLD AUTO: 3.8 % (ref 0.3–6.2)
ERYTHROCYTE [DISTWIDTH] IN BLOOD BY AUTOMATED COUNT: 16 % (ref 12.3–15.4)
GLUCOSE BLDC GLUCOMTR-MCNC: 246 MG/DL (ref 70–130)
GLUCOSE BLDC GLUCOMTR-MCNC: 255 MG/DL (ref 70–130)
GLUCOSE BLDC GLUCOMTR-MCNC: 302 MG/DL (ref 70–130)
GLUCOSE BLDC GLUCOMTR-MCNC: 315 MG/DL (ref 70–130)
GLUCOSE SERPL-MCNC: 214 MG/DL (ref 65–99)
HCT VFR BLD AUTO: 30.2 % (ref 34–46.6)
HGB BLD-MCNC: 9.7 G/DL (ref 12–15.9)
IMM GRANULOCYTES # BLD AUTO: 0.44 10*3/MM3 (ref 0–0.05)
IMM GRANULOCYTES NFR BLD AUTO: 3.5 % (ref 0–0.5)
LYMPHOCYTES # BLD AUTO: 1.81 10*3/MM3 (ref 0.7–3.1)
LYMPHOCYTES NFR BLD AUTO: 14.2 % (ref 19.6–45.3)
MCH RBC QN AUTO: 27.6 PG (ref 26.6–33)
MCHC RBC AUTO-ENTMCNC: 32.1 G/DL (ref 31.5–35.7)
MCV RBC AUTO: 85.8 FL (ref 79–97)
MONOCYTES # BLD AUTO: 0.85 10*3/MM3 (ref 0.1–0.9)
MONOCYTES NFR BLD AUTO: 6.7 % (ref 5–12)
NEUTROPHILS NFR BLD AUTO: 71 % (ref 42.7–76)
NEUTROPHILS NFR BLD AUTO: 9.04 10*3/MM3 (ref 1.7–7)
NRBC BLD AUTO-RTO: 0.1 /100 WBC (ref 0–0.2)
PLATELET # BLD AUTO: 255 10*3/MM3 (ref 140–450)
PMV BLD AUTO: 10.1 FL (ref 6–12)
POTASSIUM SERPL-SCNC: 4.2 MMOL/L (ref 3.5–5.2)
RBC # BLD AUTO: 3.52 10*6/MM3 (ref 3.77–5.28)
SODIUM SERPL-SCNC: 142 MMOL/L (ref 136–145)
WBC NRBC COR # BLD AUTO: 12.72 10*3/MM3 (ref 3.4–10.8)

## 2024-02-05 PROCEDURE — 80048 BASIC METABOLIC PNL TOTAL CA: CPT | Performed by: STUDENT IN AN ORGANIZED HEALTH CARE EDUCATION/TRAINING PROGRAM

## 2024-02-05 PROCEDURE — 97530 THERAPEUTIC ACTIVITIES: CPT

## 2024-02-05 PROCEDURE — 63710000001 INSULIN LISPRO (HUMAN) PER 5 UNITS: Performed by: STUDENT IN AN ORGANIZED HEALTH CARE EDUCATION/TRAINING PROGRAM

## 2024-02-05 PROCEDURE — 63710000001 INSULIN LISPRO (HUMAN) PER 5 UNITS: Performed by: INTERNAL MEDICINE

## 2024-02-05 PROCEDURE — 82948 REAGENT STRIP/BLOOD GLUCOSE: CPT

## 2024-02-05 PROCEDURE — 63710000001 INSULIN GLARGINE PER 5 UNITS: Performed by: STUDENT IN AN ORGANIZED HEALTH CARE EDUCATION/TRAINING PROGRAM

## 2024-02-05 PROCEDURE — 85025 COMPLETE CBC W/AUTO DIFF WBC: CPT | Performed by: STUDENT IN AN ORGANIZED HEALTH CARE EDUCATION/TRAINING PROGRAM

## 2024-02-05 RX ORDER — INSULIN LISPRO 100 [IU]/ML
11 INJECTION, SOLUTION INTRAVENOUS; SUBCUTANEOUS
Status: DISCONTINUED | OUTPATIENT
Start: 2024-02-05 | End: 2024-02-06

## 2024-02-05 RX ADMIN — INSULIN LISPRO 3 UNITS: 100 INJECTION, SOLUTION INTRAVENOUS; SUBCUTANEOUS at 18:18

## 2024-02-05 RX ADMIN — ANORECTAL OINTMENT 1 APPLICATION: 15.7; .44; 24; 20.6 OINTMENT TOPICAL at 18:20

## 2024-02-05 RX ADMIN — ATORVASTATIN CALCIUM 80 MG: 80 TABLET, FILM COATED ORAL at 20:52

## 2024-02-05 RX ADMIN — AMLODIPINE BESYLATE 5 MG: 5 TABLET ORAL at 08:18

## 2024-02-05 RX ADMIN — ANORECTAL OINTMENT 1 APPLICATION: 15.7; .44; 24; 20.6 OINTMENT TOPICAL at 08:21

## 2024-02-05 RX ADMIN — FAMOTIDINE 20 MG: 20 TABLET, FILM COATED ORAL at 08:18

## 2024-02-05 RX ADMIN — FIDAXOMICIN 200 MG: 200 TABLET, FILM COATED ORAL at 08:18

## 2024-02-05 RX ADMIN — CASTOR OIL AND BALSAM, PERU 1 APPLICATION: 788; 87 OINTMENT TOPICAL at 08:20

## 2024-02-05 RX ADMIN — PREGABALIN 50 MG: 50 CAPSULE ORAL at 08:18

## 2024-02-05 RX ADMIN — ANORECTAL OINTMENT 1 APPLICATION: 15.7; .44; 24; 20.6 OINTMENT TOPICAL at 20:52

## 2024-02-05 RX ADMIN — CASTOR OIL AND BALSAM, PERU 1 APPLICATION: 788; 87 OINTMENT TOPICAL at 20:52

## 2024-02-05 RX ADMIN — HYDROXYZINE HYDROCHLORIDE 10 MG: 10 TABLET ORAL at 14:08

## 2024-02-05 RX ADMIN — Medication 1 TABLET: at 08:18

## 2024-02-05 RX ADMIN — Medication 1000 MCG: at 08:18

## 2024-02-05 RX ADMIN — POTASSIUM CHLORIDE 20 MEQ: 750 TABLET, EXTENDED RELEASE ORAL at 08:18

## 2024-02-05 RX ADMIN — Medication 3 MG: at 20:52

## 2024-02-05 RX ADMIN — LEVOTHYROXINE SODIUM 112 MCG: 112 TABLET ORAL at 05:47

## 2024-02-05 RX ADMIN — HYDROXYZINE HYDROCHLORIDE 10 MG: 10 TABLET ORAL at 06:48

## 2024-02-05 RX ADMIN — INSULIN LISPRO 4 UNITS: 100 INJECTION, SOLUTION INTRAVENOUS; SUBCUTANEOUS at 12:28

## 2024-02-05 RX ADMIN — FUROSEMIDE 20 MG: 20 TABLET ORAL at 18:19

## 2024-02-05 RX ADMIN — INSULIN GLARGINE 21 UNITS: 100 INJECTION, SOLUTION SUBCUTANEOUS at 21:30

## 2024-02-05 RX ADMIN — INSULIN LISPRO 11 UNITS: 100 INJECTION, SOLUTION INTRAVENOUS; SUBCUTANEOUS at 18:18

## 2024-02-05 RX ADMIN — HYDROCODONE BITARTRATE AND ACETAMINOPHEN 1 TABLET: 10; 325 TABLET ORAL at 20:51

## 2024-02-05 RX ADMIN — HYDROXYZINE HYDROCHLORIDE 10 MG: 10 TABLET ORAL at 21:30

## 2024-02-05 RX ADMIN — INSULIN LISPRO 9 UNITS: 100 INJECTION, SOLUTION INTRAVENOUS; SUBCUTANEOUS at 08:36

## 2024-02-05 RX ADMIN — INSULIN LISPRO 5 UNITS: 100 INJECTION, SOLUTION INTRAVENOUS; SUBCUTANEOUS at 21:30

## 2024-02-05 RX ADMIN — BISOPROLOL FUMARATE 5 MG: 5 TABLET, FILM COATED ORAL at 08:18

## 2024-02-05 RX ADMIN — FIDAXOMICIN 200 MG: 200 TABLET, FILM COATED ORAL at 20:52

## 2024-02-05 RX ADMIN — INSULIN LISPRO 5 UNITS: 100 INJECTION, SOLUTION INTRAVENOUS; SUBCUTANEOUS at 08:36

## 2024-02-05 RX ADMIN — INSULIN LISPRO 9 UNITS: 100 INJECTION, SOLUTION INTRAVENOUS; SUBCUTANEOUS at 12:28

## 2024-02-05 RX ADMIN — ANORECTAL OINTMENT 1 APPLICATION: 15.7; .44; 24; 20.6 OINTMENT TOPICAL at 12:29

## 2024-02-05 RX ADMIN — FUROSEMIDE 20 MG: 20 TABLET ORAL at 08:18

## 2024-02-05 NOTE — PLAN OF CARE
Goal Outcome Evaluation:    Progress: improving  Outcome Evaluation: Ms. Guidry admitted on 1/27 for sepsis. C/o mild generalized pain. PRN Atarax given 1x for anxiety. Up in chair. Had 2 BM's this morning. Insulin (Lantus and SS) adjusted. Dificid continued. Skin care provided. No family at bedside. Patient stable and needs met at this time.

## 2024-02-05 NOTE — PLAN OF CARE
Goal Outcome Evaluation:  Plan of Care Reviewed With: patient        Progress: improving  Outcome Evaluation: Vitals stable. x1 BM today. PRN norco for pain. Wound care completed. Heels offloaded and elevated - needed multiple educations on importance re: DTI on heels and potential for worsening. x2 assist back to bed from chair. Purewick in place. Dificid continued

## 2024-02-05 NOTE — CASE MANAGEMENT/SOCIAL WORK
Continued Stay Note  [facility]     Patient Name: Halie Guidry  MRN: [id number]  Today's Date: 2/5/2024    Admit Date: 1/27/2024    Plan: [facility] pendign bed availability and pre-cert. Patient getting Dificid until 2/10.   Discharge Plan       Row Name 02/05/24 1512       Plan    Plan [facility] pendign bed availability and pre-cert. Patient getting Dificid until 2/10.    Patient/Family in Agreement with Plan yes    Plan Comments Plan remains [facility] Skilled pending pre-cert (not initiated). Patient here till 2/10 finsihing Dificid. Will need transport at discharge.                   Discharge Codes    No documentation.                 Expected Discharge Date and Time       Expected Discharge Date Expected Discharge Time    Feb 10, 2024

## 2024-02-05 NOTE — PLAN OF CARE
Goal Outcome Evaluation:  Plan of Care Reviewed With: (P) patient           Outcome Evaluation: (P) Pt [facility] at arrival of PT this PM, agreeable to therapy. She stood with mod A x 2 and RWx twice. She maintained standing balance for up to 1.5 minutes with VC for upright posture. She requires VC for hand placement and respiratory rate increases with effort of standing. Pt denied walking this date, as well as LE therex. Pt returned to chair with alarm on and call light within reach. PT will progress as suzanne.      Anticipated Discharge Disposition (PT): (P) skilled nursing facility

## 2024-02-05 NOTE — PROGRESS NOTES
"Assessment Date:  02/05/24    NUTRITION SCREENING      Reason for Encounter Nonhealing wound or pressure ulcer   Diagnosis/Problem Diarrhea and AMS and was found to have severe sepsis. Pt has a hx of CKD IIIa, HTN, T2DM, Diabetic foot wound infected with multidrug resistant Enterobacter and recurrent C. Diff.       PO Diet Diet: Cardiac Diets, Diabetic Diets; Healthy Heart (2-3 Na+); Consistent Carbohydrate; Texture: Regular Texture (IDDSI 7); Fluid Consistency: Thin (IDDSI 0)   Supplements Boost QD and Chris QD   PO Intake % Per EMR, Pt eating 100% of meals.        Medications MAR reviewed by RD   Labs  Listed below, reviewed   Physical Findings Awake, alert, oriented, obese   GI Function Fecal incontinence, Last BM 2/5   Skin Status Balteral medial sacral spine PI, Left anterior third toe diabetic ulcer, Left posterior heel PI, Right anterior third toe, Left heel PI, Left posterior plantar diabetic ulcer.        Height  Weight  BMI  Weight Trend     Height: 172.7 cm (68\")  Weight: 97.8 kg (215 lb 9.8 oz) (02/05/24 0548)  Body mass index is 32.78 kg/m².  Stable, Other: Pt reported her weight fluctuates normally due to fluid accumulation in her legs.        Nutrition Problem (PES) Increased nutrients needs related to wound healing as evidenced by multiple wounds present.        Intervention/Plan Chris BID    RD to follow up per protocol.     Results from last 7 days   Lab Units 02/05/24  0630 02/04/24  1538 02/04/24  0522 02/03/24  0405 02/01/24  0421 01/31/24  0450 01/30/24  1501 01/30/24  0513   SODIUM mmol/L 142  --  142 140   < > 142  --  143   POTASSIUM mmol/L 4.2 4.1 3.3* 3.7   < > 3.3*   < > 2.9*   CHLORIDE mmol/L 108*  --  108* 107   < > 110*  --  110*   CO2 mmol/L 21.9*  --  23.0 21.1*   < > 18.6*  --  21.0*   BUN mg/dL 16  --  16 16   < > 34*  --  48*   CREATININE mg/dL 1.17*  --  1.02* 0.98   < > 1.41*  --  2.20*   CALCIUM mg/dL 7.7*  --  7.6* 7.6*   < > 7.4*  --  7.5*   BILIRUBIN mg/dL  --   --   --   -- "   --  0.3  --  <0.2   ALK PHOS U/L  --   --   --   --   --  93  --  115   ALT (SGPT) U/L  --   --   --   --   --  7  --  11   AST (SGOT) U/L  --   --   --   --   --  19  --  18   GLUCOSE mg/dL 214*  --  145* 187*   < > 189*  --  85    < > = values in this interval not displayed.     Results from last 7 days   Lab Units 02/05/24  0630 02/04/24  0522 02/03/24  0405 02/02/24  0439 02/01/24  0421   MAGNESIUM mg/dL  --   --  1.8 2.0 1.6   PHOSPHORUS mg/dL  --   --  3.9 2.4* 2.6   HEMOGLOBIN g/dL 9.7*   < > 10.1* 9.8* 10.4*   HEMATOCRIT % 30.2*   < > 31.8* 30.7* 33.2*    < > = values in this interval not displayed.     Lab Results   Component Value Date    HGBA1C 8.20 (H) 12/24/2023         Electronically signed by:  Carson Loyola  02/05/24 12:35 EST

## 2024-02-05 NOTE — THERAPY TREATMENT NOTE
Patient Name: Halie Guidry  : 1940    MRN: [id number]                              Today's Date: 2024       Admit Date: 2024    Visit Dx:     ICD-10-CM ICD-9-CM   1. Sepsis with acute renal failure, due to unspecified organism, unspecified acute renal failure type, unspecified whether septic shock present  A41.9 038.9    R65.20 995.92    N17.9 584.9   2. Encephalopathy  G93.40 348.30     Patient Active Problem List   Diagnosis    Compression fracture    Lumbar degenerative disc disease    Type 2 diabetes mellitus with hyperglycemia, with long-term current use of insulin    Hyperlipidemia    Benign essential hypertension    Primary hypothyroidism    Neuropathy    Osteoporosis    Proteinuria    Tobacco abuse    Diabetic eye exam    Generalized weakness    Spinal stenosis    Scoliosis    Arthritis    VBI (vertebrobasilar insufficiency)    Orthostatic hypotension    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    Severe hypothyroidism    Noncompliance with medication regimen    Vitamin D deficiency disease    Altered mental status    Tremor    Seizure    Stage 3a chronic kidney disease    Thoracic degenerative disc disease    Metabolic encephalopathy    VIPUL (acute kidney injury)    Hyperglycemia    Type II diabetes mellitus, uncontrolled    Lower abdominal pain    Transaminitis    UTI (urinary tract infection)    Emphysematous cystitis    Choledocholithiasis    Hypokalemia    Hypoxia    Generalized abdominal pain    History of Clostridium difficile infection    History of ERCP    Acute UTI (urinary tract infection)    Hypomagnesemia    Burning pain    Anxiety disorder    Neuropathic pain    Intertrigo    Weakness of both lower extremities    Accelerated hypertension    Acute cystitis without hematuria    Altered mental status, unspecified altered mental status type    Left lower lobe pneumonia    Acute pain of left foot    Cellulitis and abscess of left lower extremity    Hypertensive  kidney disease with stage 3a chronic kidney disease    Bilateral leg pain    Peripheral edema    Primary malignant neuroendocrine tumor of pancreas    Encounter for long-term (current) use of high-risk medication    Diabetic foot ulcer    Acute renal insufficiency    C. difficile diarrhea    Sepsis    Stage 3a chronic kidney disease    Metabolic encephalopathy    Pressure injury of buttock, stage 2     Past Medical History:   Diagnosis Date    Acute metabolic encephalopathy 6/24/2022    Anxiety     Arthritis     Benign essential hypertension 08/20/2014    Bleeding disorder     Depression     Diabetes     Diabetes mellitus, type 2     Disc degeneration, lumbar     Headache, tension-type     Hyperlipidemia     Hypothyroidism     Neuropathy     Osteoporosis 09/09/2015    Peripheral neuropathy     Rotator cuff tear, left     Scoliosis     Shoulder pain     LEFT, TORN ROTATOR CUFF S/P FALL    Spinal stenosis      Past Surgical History:   Procedure Laterality Date    APPENDECTOMY      BILATERAL BREAST REDUCTION Bilateral 08/2015    CATARACT EXTRACTION  03/2015    CHOLECYSTECTOMY WITH INTRAOPERATIVE CHOLANGIOGRAM N/A 3/27/2022    Procedure: CHOLECYSTECTOMY LAPAROSCOPIC INTRAOPERATIVE CHOLANGIOGRAM;  Surgeon: Aiyana Hill MD;  Location: [facility];  Service: General;  Laterality: N/A;    COLONOSCOPY  06/05/2015    WNL    ERCP N/A 2/25/2022    Procedure: ENDOSCOPIC RETROGRADE CHOLANGIOPANCREATOGRAPHY with sphincterotomy and balloon sweep;  Surgeon: Chilo Wilhelm MD;  Location: [facility] ENDOSCOPY;  Service: Gastroenterology;  Laterality: N/A;  PRE/POST - CBD stones    ERCP N/A 3/28/2022    Procedure: ENDOSCOPIC RETROGRADE CHOLANGIOPANCREATOGRAPHY WITH SPHINCTEROTOMY AND BALLOON SWEEP;  Surgeon: Chilo Wilhelm MD;  Location: [facility] ENDOSCOPY;  Service: Gastroenterology;  Laterality: N/A;  PRE: COMMON DUCT STONE  POST: COMMON DUCT STONE    KYPHOPLASTY      REDUCTION MAMMAPLASTY      TONSILLECTOMY         General Information       Row Name 02/05/24 1358          Physical Therapy Time and Intention    Document Type therapy note (daily note) (P)   -AK     Mode of Treatment physical therapy;individual therapy (P)   -AK       Row Name 02/05/24 1358          General Information    Patient Profile Reviewed yes (P)   -AK     Existing Precautions/Restrictions fall (P)   -AK       Row Name 02/05/24 1358          Cognition    Orientation Status (Cognition) oriented x 3 (P)   -AK       Row Name 02/05/24 1358          Safety Issues, Functional Mobility    Impairments Affecting Function (Mobility) balance;endurance/activity tolerance;strength;range of motion (ROM) (P)   -AK               User Key  (r) = Recorded By, (t) = Taken By, (c) = Cosigned By      Initials Name Provider Type    Alyse Bruce PT Student PT Student                   Mobility       Row Name 02/05/24 1359          Bed Mobility    Comment, (Bed Mobility) pt UIC at arrival and UIC at end of session. (P)   -AK       Row Name 02/05/24 1359          Transfers    Comment, (Transfers) Pt declined transferring to bed for rest. (P)   -AK       Row Name 02/05/24 1359          Sit-Stand Transfer    Sit-Stand [place] (Transfers) moderate assist (50% patient effort);2 person assist;nonverbal cues (demo/gesture);verbal cues (P)   -AK     Assistive Device (Sit-Stand Transfers) walker, front-wheeled (P)   -AK     Comment, (Sit-Stand Transfer) 2x. VC for upright posture. Pt maintains forward flexion at hips. (P)   -AK       Row Name 02/05/24 1359          Gait/Stairs (Locomotion)    Comment, (Gait/Stairs) Pt denied walking today, wished to remain at chair. (P)   -AK               User Key  (r) = Recorded By, (t) = Taken By, (c) = Cosigned By      Initials Name Provider Type    Alyse Bruce PT Student PT Student                   Obj/Interventions       Row Name 02/05/24 1401          Balance    Balance Assessment standing static balance;sit to stand dynamic balance  (P)   -AK     Static Sitting Balance standby assist (P)   -AK     Position, Sitting Balance sitting in chair (P)   -AK     Sit to Stand Dynamic Balance minimal assist;2-person assist (P)   -AK     Static Standing Balance verbal cues;minimal assist;2-person assist (P)   -AK     Position/Device Used, Standing Balance walker, front-wheeled (P)   -AK     Balance Interventions sitting;standing;sit to stand (P)   -AK     Comment, Balance min-mod A x 2 for 2 stands, approx. 1.5 min at longest. (P)   -AK               User Key  (r) = Recorded By, (t) = Taken By, (c) = Cosigned By      Initials Name Provider Type    Alyse Bruce, PT Student PT Student                   Goals/Plan       Row Name 02/05/24 1409          Bed Mobility Goal 1 (PT)    Activity/Assistive Device (Bed Mobility Goal 1, PT) bed mobility activities, all (P)   -AK     [place] Level/Cues Needed (Bed Mobility Goal 1, PT) minimum assist (75% or more patient effort) (P)   -AK     Time Frame (Bed Mobility Goal 1, PT) 1 week (P)   -AK     Progress/Outcomes (Bed Mobility Goal 1, PT) goal ongoing (P)   -AK       Row Name 02/05/24 1409          Transfer Goal 1 (PT)    Activity/Assistive Device (Transfer Goal 1, PT) sit-to-stand/stand-to-sit;bed-to-chair/chair-to-bed;walker, rolling (P)   -AK     [place] Level/Cues Needed (Transfer Goal 1, PT) minimum assist (75% or more patient effort) (P)   -AK     Time Frame (Transfer Goal 1, PT) 1 week (P)   -AK     Progress/Outcome (Transfer Goal 1, PT) goal ongoing (P)   -AK       Row Name 02/05/24 1409          Gait Training Goal 1 (PT)    Activity/Assistive Device (Gait Training Goal 1, PT) gait (walking locomotion);assistive device use (P)   -AK     [place] Level (Gait Training Goal 1, PT) minimum assist (75% or more patient effort) (P)   -AK     Distance (Gait Training Goal 1, PT) 10 (P)   -AK     Time Frame (Gait Training Goal 1, PT) 1 week (P)   -AK     Progress/Outcome (Gait Training Goal 1, PT) goal  revised this date (P)   -AK               User Key  (r) = Recorded By, (t) = Taken By, (c) = Cosigned By      Initials Name Provider Type    Alyse Bruce, PT Student PT Student                   Clinical Impression       Row Name 02/05/24 1404          Pain    Pre/Posttreatment Pain Comment Pain score not given. Pt reports entire body hurts. (P)   -AK     Pain Intervention(s) Ambulation/increased activity;Rest;Repositioned (P)   -AK       Row Name 02/05/24 1404          Plan of Care Review    Plan of Care Reviewed With patient (P)   -AK     Outcome Evaluation Pt [facility] at arrival of PT this PM, agreeable to therapy. She stood with mod A x 2 and RWx twice. She maintained standing balance for up to 1.5 minutes with VC for upright posture. She requires VC for hand placement and respiratory rate increases with effort of standing. Pt denied walking this date, as well as LE therex. Pt returned to chair with alarm on and call light within reach. PT will progress as suzanne. (P)   -AK       Row Name 02/05/24 1404          Positioning and Restraints    Pre-Treatment Position sitting in chair/recliner (P)   -AK     Post Treatment Position chair (P)   -AK     In Chair sitting;call light within reach;encouraged to call for assist;exit alarm on (P)   -AK               User Key  (r) = Recorded By, (t) = Taken By, (c) = Cosigned By      Initials Name Provider Type    Alyse Bruce, PT Student PT Student                   Outcome Measures       Row Name 02/05/24 1405          How much help from another person do you currently need...    Turning from your back to your side while in flat bed without using bedrails? 2 (P)   -AK     Moving from lying on back to sitting on the side of a flat bed without bedrails? 2 (P)   -AK     Moving to and from a bed to a chair (including a wheelchair)? 2 (P)   -AK     Standing up from a chair using your arms (e.g., wheelchair, bedside chair)? 2 (P)   -AK     Climbing 3-5 steps with a railing? 1 (P)    -AK     To walk in hospital room? 2 (P)   -AK     AM-PAC 6 Clicks Score (PT) 11 (P)   -AK     Highest Level of Mobility Goal 4 --> Transfer to chair/commode (P)   -AK               User Key  (r) = Recorded By, (t) = Taken By, (c) = Cosigned By      Initials Name Provider Type    Alyse Bruce, PT Student PT Student                                 Physical Therapy Education       Title: PT OT SLP Therapies (Done)       Topic: Physical Therapy (Done)       Point: Mobility training (Done)       Learning Progress Summary             Patient Acceptance, E,TB,D, VU,DU by AK at 2/5/2024 1408    Acceptance, E,TB, VU by LM at 2/4/2024 1830    Acceptance, TB,E, VU by LM at 2/3/2024 1714    Acceptance, E,TB,D, VU,NR by  at 2/2/2024 1035    Acceptance, E,TB, VU,NR,DU by  at 2/1/2024 1423    Acceptance, E,TB, VU,DU,NR by  at 1/30/2024 1002    Acceptance, E,TB, VU,NR by [facility] at 1/28/2024 0848                         Point: Home exercise program (Done)       Learning Progress Summary             Patient Acceptance, E,TB, VU by LM at 2/4/2024 1830    Acceptance, TB,E, VU by LM at 2/3/2024 1714    Acceptance, E,TB,D, VU,NR by  at 2/2/2024 1035    Acceptance, E,TB, VU,NR,DU by  at 2/1/2024 1423    Acceptance, E,TB, VU,DU,NR by  at 1/30/2024 1002    Acceptance, E,TB, VU,NR by 1 at 1/28/2024 0848                         Point: Body mechanics (Done)       Learning Progress Summary             Patient Acceptance, E,TB,D, VU,DU by AK at 2/5/2024 1408    Acceptance, E,TB, VU by LM at 2/4/2024 1830    Acceptance, TB,E, VU by LM at 2/3/2024 1714    Acceptance, E,TB,D, VU,NR by PH at 2/2/2024 1035    Acceptance, E,TB, VU,NR,DU by PH at 2/1/2024 1423    Acceptance, E,TB, VU,DU,NR by CS at 1/30/2024 1002    Acceptance, E,TB, VU,NR by CS1 at 1/28/2024 0848                         Point: Precautions (Done)       Learning Progress Summary             Patient Acceptance, E,TB, VU by LM at 2/4/2024 1830    Acceptance, TB,E, VU by LM  at 2/3/2024 1714    Acceptance, E,TB,D, VU,NR by  at 2/2/2024 1035    Acceptance, E,TB, VU,NR,DU by  at 2/1/2024 1423    Acceptance, E,TB, VU,DU,NR by  at 1/30/2024 1002    Acceptance, E,TB, VU,NR by [facility] at 1/28/2024 0848                                         User Key       Initials Effective Dates Name Provider Type Discipline     06/16/21 -  Margarita Lang, RN Registered Nurse Nurse    [facility] 07/11/23 -  Bakari Lerma, PT Physical Therapist PT     06/16/21 -  Joana Pimentel PTA Physical Therapist Assistant PT     09/22/22 -  Lizzy Waterman, PT Physical Therapist PT    AK 12/28/23 -  Alyse Mckeon, PT Student PT Student PT                  PT Recommendation and Plan     Plan of Care Reviewed With: (P) patient  Outcome Evaluation: (P) Pt [facility] at arrival of PT this PM, agreeable to therapy. She stood with mod A x 2 and RWx twice. She maintained standing balance for up to 1.5 minutes with VC for upright posture. She requires VC for hand placement and respiratory rate increases with effort of standing. Pt denied walking this date, as well as LE therex. Pt returned to chair with alarm on and call light within reach. PT will progress as suzanne.     Time Calculation:         PT Charges       Row Name 02/05/24 1408             Time Calculation    Start Time 1337 (P)   -AK      Stop Time 1353 (P)   -AK      Time Calculation (min) 16 min (P)   -AK      PT Received On 02/05/24 (P)   -AK      PT - Next Appointment 02/07/24 (P)   -AK      PT Goal Re-Cert Due Date 02/12/24 (P)   -AK         Time Calculation- PT    Total Timed Code Minutes- PT 16 minute(s) (P)   -AK                User Key  (r) = Recorded By, (t) = Taken By, (c) = Cosigned By      Initials Name Provider Type    AK Alyse Mckeon, PT Student PT Student                  Therapy Charges for Today       Code Description Service Date Service Provider Modifiers Qty    [id number] HC PT THERAPEUTIC ACT EA 15 MIN 2/5/2024 Alyse Mckeon, PT Student GP 1     [id number]  PT THER SUPP EA 15 MIN 2/5/2024 Alyse Mckeon, PT Student GP 1            PT G-Codes  Outcome Measure Options: AM-PAC 6 Clicks Basic Mobility (PT)  AM-PAC 6 Clicks Score (PT): (P) 11  AM-PAC 6 Clicks Score (OT): 12  PT Discharge Summary  Anticipated Discharge Disposition (PT): (P) skilled nursing facility    Alyse Mckeon PT Student  2/5/2024

## 2024-02-05 NOTE — PROGRESS NOTES
Name: Halie Guidry ADMIT: 2024   : 1940  PCP: Luis Felipe Flowers MD    MRN: [id number] LOS: 9 days   AGE/SEX: 83 y.o. female  ROOM: [facility]     Subjective   Subjective   Patient seen and examined this morning.  Hospital day 9.  She is sitting up in chair next to bedside, resting comfortably.  Abdominal pain and diarrhea symptoms continue to improve.  Blood pressure appears improved on most recent check.         Objective   Objective   Vital Signs  Temp:  [97.3 °F (36.3 °C)-99.3 °F (37.4 °C)] 97.5 °F (36.4 °C)  Heart Rate:  [66-80] 80  Resp:  [17-18] 18  BP: (157-181)/(67-83) 181/79  SpO2:  [96 %-99 %] 99 %  on   ;   Device (Oxygen Therapy): room air  Body mass index is 32.78 kg/m².  Physical Exam  Vitals and nursing note reviewed.   Constitutional:       General: She is not in acute distress.     Appearance: She is ill-appearing.   Cardiovascular:      Rate and Rhythm: Normal rate and regular rhythm.      Pulses: Normal pulses.      Heart sounds: Normal heart sounds.   Pulmonary:      Effort: Pulmonary effort is normal. No respiratory distress.      Breath sounds: Normal breath sounds.   Abdominal:      General: Bowel sounds are normal. There is no distension.      Palpations: Abdomen is soft.      Tenderness: There is no abdominal tenderness.   Skin:     General: Skin is warm and dry.      Coloration: Skin is pale.   Neurological:      Mental Status: She is alert.       Results Review     I reviewed the patient's new clinical results.  Results from last 7 days   Lab Units 24  0630 24  0524  0405 24  0439   WBC 10*3/mm3 12.72* 16.92* 17.66* 19.35*   HEMOGLOBIN g/dL 9.7* 10.2* 10.1* 9.8*   PLATELETS 10*3/mm3 255 252 256 260     Results from last 7 days   Lab Units 24  0630 24  1538 24  0522 24  0405 24  0439   SODIUM mmol/L 142  --  142 140 140   POTASSIUM mmol/L 4.2 4.1 3.3* 3.7 4.0   CHLORIDE mmol/L 108*  --  108* 107 109*   CO2 mmol/L  21.9*  --  23.0 21.1* 21.0*   BUN mg/dL 16  --  16 16 17   CREATININE mg/dL 1.17*  --  1.02* 0.98 1.27*   GLUCOSE mg/dL 214*  --  145* 187* 211*   EGFR mL/min/1.73 46.4*  --  54.7* 57.4* 42.0*     Results from last 7 days   Lab Units 02/03/24 0405 02/02/24 0439 02/01/24 0421 01/31/24  0450 01/30/24  0513   ALBUMIN g/dL 2.1* 2.2* 2.0* 2.2* 2.1*   BILIRUBIN mg/dL  --   --   --  0.3 <0.2   ALK PHOS U/L  --   --   --  93 115   AST (SGOT) U/L  --   --   --  19 18   ALT (SGPT) U/L  --   --   --  7 11     Results from last 7 days   Lab Units 02/05/24 0630 02/04/24 0522 02/03/24 0405 02/02/24 0439 02/01/24 0421 01/31/24  0450   CALCIUM mg/dL 7.7* 7.6* 7.6* 7.6* 7.7* 7.4*   ALBUMIN g/dL  --   --  2.1* 2.2* 2.0* 2.2*   MAGNESIUM mg/dL  --   --  1.8 2.0 1.6 2.0   PHOSPHORUS mg/dL  --   --  3.9 2.4* 2.6 2.7           Glucose   Date/Time Value Ref Range Status   02/05/2024 0824 302 (H) 70 - 130 mg/dL Final   02/04/2024 2039 226 (H) 70 - 130 mg/dL Final   02/04/2024 1645 185 (H) 70 - 130 mg/dL Final   02/04/2024 1228 174 (H) 70 - 130 mg/dL Final   02/04/2024 0834 160 (H) 70 - 130 mg/dL Final   02/03/2024 2055 192 (H) 70 - 130 mg/dL Final   02/03/2024 1723 237 (H) 70 - 130 mg/dL Final       No radiology results for the last day    I have personally reviewed all medications:  Scheduled Medications  amLODIPine, 5 mg, Oral, Q24H  atorvastatin, 80 mg, Oral, Nightly  bisoprolol, 5 mg, Oral, Daily  castor oil-balsam peru, 1 Application, Topical, Q12H  famotidine, 20 mg, Oral, Daily  fidaxomicin, 200 mg, Oral, Q12H  furosemide, 20 mg, Oral, BID  insulin glargine, 18 Units, Subcutaneous, Nightly  insulin lispro, 2-7 Units, Subcutaneous, 4x Daily AC & at Bedtime  insulin lispro, 9 Units, Subcutaneous, TID With Meals  levothyroxine, 112 mcg, Oral, Once per day on Monday Tuesday Wednesday Thursday Friday Saturday  levothyroxine, 168 mcg, Oral, Weekly  Menthol-Zinc Oxide, 1 Application, Topical, 4x Daily  multivitamin with minerals, 1  tablet, Oral, Daily  potassium chloride, 20 mEq, Oral, Daily  pregabalin, 50 mg, Oral, Daily  cyanocobalamin, 1,000 mcg, Oral, Daily    Infusions  Pharmacy Consult,     Diet  Diet: Cardiac Diets, Diabetic Diets; Healthy Heart (2-3 Na+); Consistent Carbohydrate; Texture: Regular Texture (IDDSI 7); Fluid Consistency: Thin (IDDSI 0)    I have personally reviewed:  [x]  Laboratory   [x]  Microbiology   [x]  Radiology   [x]  EKG/Telemetry  [x]  Cardiology/Vascular   []  Pathology    []  Records       Assessment/Plan     Active Hospital Problems    Diagnosis  POA    **Sepsis [A41.9]  Yes    Stage 3a chronic kidney disease [N18.31]  Yes    Metabolic encephalopathy [G93.41]  Yes    Pressure injury of buttock, stage 2 [L89.302]  Yes    C. difficile diarrhea [A04.72]  Yes    Neuropathic pain [M79.2]  Yes    Acute UTI (urinary tract infection) [N39.0]  Yes    VIPUL (acute kidney injury) [N17.9]  Yes    Seizure [R56.9]  Yes    Hyperlipidemia [E78.5]  Yes    Type 2 diabetes mellitus with hyperglycemia, with long-term current use of insulin [E11.65, Z79.4]  Not Applicable    Benign essential hypertension [I10]  Yes      Resolved Hospital Problems   No resolved problems to display.       83 y.o. female admitted with Sepsis.    Sepsis  Recurrent C. Difficile colitis  - ID consulted and has followed. Patient has been on Vancomycin, but Dificid has now been approved on 01/31, so Vancomycin was stopped and Dificid was started (01/31/24), she remains on this at present. WBC remains elevated, but improved overall.  Symptoms continue to improve.  - ID plans for 10-day course, and after completion, ID recommending daily probiotic x 3 months. Discussed with CCP, she will not be able to take this medication at rehab, so will have to stay here to complete antibiotic course. Appreciate ID help, continue medication as prescribed.  - Order repeat CBC in AM for reassessment of WBC count.    Acute kidney injury  - Etiology most likely pre-renal in  nature, due to volume depletion, decreased PO intake, impaired autoregulation from Lisinopril. Nephrology has followed, creatinine peaked at 4.99, has received IVF, which has led to improvement. They signed off (02/04), greatly appreciate their help.  - Creatinine relatively stable, but slightly worse today after restarting diuretic, need to closely monitor this and diuretic dose may require adjustment if creatinine continues to worsen.  - Order repeat BMP in AM for reassessment. Closely monitor renal function.    Type 2 diabetes with hyperglycemia  - Most recent A1c: 8.20% (12/24/23).  - Current treatment regimen: Glargine 18 units nightly, lispro 9 units TID with meals, correctional SSI; Glucose uncontrolled around mealtimes, warrants adjustment.  - Increase Glargine to 21 units nightly, increase lispro to 11 units TID with meals, continue correctional SSI.  - Will monitor 24 hour insulin requirements and adjust as needed to achieve target inpatient range of 140-180.    Hypertension  - Blood pressure is elevated more on 02/04, with SBP >180, however, patient asymptomatic, consistent with urgency, but it is transient elevation.  - (02/04): Discussed with Nephrology, resumed home furosemide but do 20 mg BID rather than 20 mg daily she was on previously, also going to resume Amlodipine but at lower dose of 5 mg daily rather than 10 she is on at home for now. Added supplemental potassium in setting of diuretic.  - Blood pressure better controlled on most recent vital check after AM medications, so will continue current treatments as prescribed and monitor patient response to treatment, trend BP to guide ongoing management decisions.  - Continue to hold home Lisinopril.    Anemia  - Hemoglobin low on most recent labs, however, relatively stable from prior. No evidence of overt blood loss. No indications for acute intervention at this time.    - Order repeat CBC in AM for reassessment. Continue to monitor, transfuse for  hemoglobin <7.    Anxiety  - Endorsed by patient on 02/02, given PRN Hydroxyzine, which has helped symptoms. Continue as prescribed.     Hypothyroidism  - Continue levothyroxine as prescribed.    Hyperlipidemia  - Continue Statin as prescribed.    Hypokalemia, resolved  - K low on prior labs, repleted via electrolyte protocol. Follow up repeat labs to guide ongoing management decisions. Replete PRN per protocol. Continue to monitor    Hypophosphatemia, resolved  - Noted on prior labs, improved on most recent testing.    SCDs for DVT prophylaxis.  Limited code (no CPR, no intubation).  Discussed with patient, nursing staff, CCP, and care team on multidisciplinary rounds.  Anticipate discharge to SNU facility later this week.      Zachariah Ivory DO  [facility]  02/05/24

## 2024-02-06 LAB
ANION GAP SERPL CALCULATED.3IONS-SCNC: 13.2 MMOL/L (ref 5–15)
BASOPHILS # BLD AUTO: 0.09 10*3/MM3 (ref 0–0.2)
BASOPHILS NFR BLD AUTO: 0.7 % (ref 0–1.5)
BUN SERPL-MCNC: 21 MG/DL (ref 8–23)
BUN/CREAT SERPL: 17.6 (ref 7–25)
CALCIUM SPEC-SCNC: 8.1 MG/DL (ref 8.6–10.5)
CHLORIDE SERPL-SCNC: 104 MMOL/L (ref 98–107)
CO2 SERPL-SCNC: 22.8 MMOL/L (ref 22–29)
CREAT SERPL-MCNC: 1.19 MG/DL (ref 0.57–1)
DEPRECATED RDW RBC AUTO: 50.7 FL (ref 37–54)
EGFRCR SERPLBLD CKD-EPI 2021: 45.5 ML/MIN/1.73
EOSINOPHIL # BLD AUTO: 0.51 10*3/MM3 (ref 0–0.4)
EOSINOPHIL NFR BLD AUTO: 3.9 % (ref 0.3–6.2)
ERYTHROCYTE [DISTWIDTH] IN BLOOD BY AUTOMATED COUNT: 16.1 % (ref 12.3–15.4)
GLUCOSE BLDC GLUCOMTR-MCNC: 188 MG/DL (ref 70–130)
GLUCOSE BLDC GLUCOMTR-MCNC: 198 MG/DL (ref 70–130)
GLUCOSE BLDC GLUCOMTR-MCNC: 209 MG/DL (ref 70–130)
GLUCOSE BLDC GLUCOMTR-MCNC: 237 MG/DL (ref 70–130)
GLUCOSE SERPL-MCNC: 221 MG/DL (ref 65–99)
HCT VFR BLD AUTO: 34.6 % (ref 34–46.6)
HGB BLD-MCNC: 10.9 G/DL (ref 12–15.9)
IMM GRANULOCYTES # BLD AUTO: 0.28 10*3/MM3 (ref 0–0.05)
IMM GRANULOCYTES NFR BLD AUTO: 2.1 % (ref 0–0.5)
LYMPHOCYTES # BLD AUTO: 1.98 10*3/MM3 (ref 0.7–3.1)
LYMPHOCYTES NFR BLD AUTO: 15.1 % (ref 19.6–45.3)
MCH RBC QN AUTO: 27.3 PG (ref 26.6–33)
MCHC RBC AUTO-ENTMCNC: 31.5 G/DL (ref 31.5–35.7)
MCV RBC AUTO: 86.7 FL (ref 79–97)
MONOCYTES # BLD AUTO: 0.99 10*3/MM3 (ref 0.1–0.9)
MONOCYTES NFR BLD AUTO: 7.5 % (ref 5–12)
NEUTROPHILS NFR BLD AUTO: 70.7 % (ref 42.7–76)
NEUTROPHILS NFR BLD AUTO: 9.28 10*3/MM3 (ref 1.7–7)
NRBC BLD AUTO-RTO: 0 /100 WBC (ref 0–0.2)
PLATELET # BLD AUTO: 283 10*3/MM3 (ref 140–450)
PMV BLD AUTO: 9.8 FL (ref 6–12)
POTASSIUM SERPL-SCNC: 4 MMOL/L (ref 3.5–5.2)
RBC # BLD AUTO: 3.99 10*6/MM3 (ref 3.77–5.28)
SODIUM SERPL-SCNC: 140 MMOL/L (ref 136–145)
WBC NRBC COR # BLD AUTO: 13.13 10*3/MM3 (ref 3.4–10.8)

## 2024-02-06 PROCEDURE — 63710000001 INSULIN GLARGINE PER 5 UNITS: Performed by: STUDENT IN AN ORGANIZED HEALTH CARE EDUCATION/TRAINING PROGRAM

## 2024-02-06 PROCEDURE — 80048 BASIC METABOLIC PNL TOTAL CA: CPT | Performed by: STUDENT IN AN ORGANIZED HEALTH CARE EDUCATION/TRAINING PROGRAM

## 2024-02-06 PROCEDURE — 85025 COMPLETE CBC W/AUTO DIFF WBC: CPT | Performed by: STUDENT IN AN ORGANIZED HEALTH CARE EDUCATION/TRAINING PROGRAM

## 2024-02-06 PROCEDURE — 82948 REAGENT STRIP/BLOOD GLUCOSE: CPT

## 2024-02-06 PROCEDURE — 63710000001 INSULIN LISPRO (HUMAN) PER 5 UNITS: Performed by: INTERNAL MEDICINE

## 2024-02-06 PROCEDURE — 63710000001 INSULIN LISPRO (HUMAN) PER 5 UNITS: Performed by: STUDENT IN AN ORGANIZED HEALTH CARE EDUCATION/TRAINING PROGRAM

## 2024-02-06 RX ORDER — FUROSEMIDE 20 MG/1
20 TABLET ORAL DAILY
Status: DISCONTINUED | OUTPATIENT
Start: 2024-02-07 | End: 2024-02-10 | Stop reason: HOSPADM

## 2024-02-06 RX ORDER — INSULIN LISPRO 100 [IU]/ML
13 INJECTION, SOLUTION INTRAVENOUS; SUBCUTANEOUS
Status: DISCONTINUED | OUTPATIENT
Start: 2024-02-06 | End: 2024-02-10 | Stop reason: HOSPADM

## 2024-02-06 RX ORDER — AMLODIPINE BESYLATE 5 MG/1
5 TABLET ORAL ONCE
Status: COMPLETED | OUTPATIENT
Start: 2024-02-06 | End: 2024-02-06

## 2024-02-06 RX ORDER — AMLODIPINE BESYLATE 10 MG/1
10 TABLET ORAL
Status: DISCONTINUED | OUTPATIENT
Start: 2024-02-07 | End: 2024-02-10 | Stop reason: HOSPADM

## 2024-02-06 RX ADMIN — ANORECTAL OINTMENT 1 APPLICATION: 15.7; .44; 24; 20.6 OINTMENT TOPICAL at 12:21

## 2024-02-06 RX ADMIN — PREGABALIN 50 MG: 50 CAPSULE ORAL at 08:37

## 2024-02-06 RX ADMIN — BISOPROLOL FUMARATE 5 MG: 5 TABLET, FILM COATED ORAL at 08:38

## 2024-02-06 RX ADMIN — INSULIN GLARGINE 25 UNITS: 100 INJECTION, SOLUTION SUBCUTANEOUS at 21:42

## 2024-02-06 RX ADMIN — HYDROCODONE BITARTRATE AND ACETAMINOPHEN 1 TABLET: 10; 325 TABLET ORAL at 16:28

## 2024-02-06 RX ADMIN — HYDROXYZINE HYDROCHLORIDE 10 MG: 10 TABLET ORAL at 09:54

## 2024-02-06 RX ADMIN — INSULIN LISPRO 2 UNITS: 100 INJECTION, SOLUTION INTRAVENOUS; SUBCUTANEOUS at 12:20

## 2024-02-06 RX ADMIN — FAMOTIDINE 20 MG: 20 TABLET, FILM COATED ORAL at 08:37

## 2024-02-06 RX ADMIN — CASTOR OIL AND BALSAM, PERU 1 APPLICATION: 788; 87 OINTMENT TOPICAL at 21:43

## 2024-02-06 RX ADMIN — Medication 1 TABLET: at 08:37

## 2024-02-06 RX ADMIN — INSULIN LISPRO 11 UNITS: 100 INJECTION, SOLUTION INTRAVENOUS; SUBCUTANEOUS at 08:40

## 2024-02-06 RX ADMIN — INSULIN LISPRO 3 UNITS: 100 INJECTION, SOLUTION INTRAVENOUS; SUBCUTANEOUS at 08:38

## 2024-02-06 RX ADMIN — HYDROXYZINE HYDROCHLORIDE 10 MG: 10 TABLET ORAL at 18:11

## 2024-02-06 RX ADMIN — AMLODIPINE BESYLATE 5 MG: 5 TABLET ORAL at 16:28

## 2024-02-06 RX ADMIN — FIDAXOMICIN 200 MG: 200 TABLET, FILM COATED ORAL at 08:37

## 2024-02-06 RX ADMIN — INSULIN LISPRO 11 UNITS: 100 INJECTION, SOLUTION INTRAVENOUS; SUBCUTANEOUS at 12:20

## 2024-02-06 RX ADMIN — HYDROCODONE BITARTRATE AND ACETAMINOPHEN 1 TABLET: 10; 325 TABLET ORAL at 22:41

## 2024-02-06 RX ADMIN — INSULIN LISPRO 2 UNITS: 100 INJECTION, SOLUTION INTRAVENOUS; SUBCUTANEOUS at 18:11

## 2024-02-06 RX ADMIN — ANORECTAL OINTMENT 1 APPLICATION: 15.7; .44; 24; 20.6 OINTMENT TOPICAL at 21:43

## 2024-02-06 RX ADMIN — LEVOTHYROXINE SODIUM 112 MCG: 112 TABLET ORAL at 06:30

## 2024-02-06 RX ADMIN — ANORECTAL OINTMENT 1 APPLICATION: 15.7; .44; 24; 20.6 OINTMENT TOPICAL at 09:54

## 2024-02-06 RX ADMIN — ATORVASTATIN CALCIUM 80 MG: 80 TABLET, FILM COATED ORAL at 21:42

## 2024-02-06 RX ADMIN — FUROSEMIDE 20 MG: 20 TABLET ORAL at 08:38

## 2024-02-06 RX ADMIN — Medication 1000 MCG: at 08:38

## 2024-02-06 RX ADMIN — Medication 3 MG: at 21:42

## 2024-02-06 RX ADMIN — POTASSIUM CHLORIDE 20 MEQ: 750 TABLET, EXTENDED RELEASE ORAL at 08:37

## 2024-02-06 RX ADMIN — INSULIN LISPRO 13 UNITS: 100 INJECTION, SOLUTION INTRAVENOUS; SUBCUTANEOUS at 18:11

## 2024-02-06 RX ADMIN — INSULIN LISPRO 3 UNITS: 100 INJECTION, SOLUTION INTRAVENOUS; SUBCUTANEOUS at 21:42

## 2024-02-06 RX ADMIN — AMLODIPINE BESYLATE 5 MG: 5 TABLET ORAL at 08:38

## 2024-02-06 RX ADMIN — FIDAXOMICIN 200 MG: 200 TABLET, FILM COATED ORAL at 21:42

## 2024-02-06 RX ADMIN — CASTOR OIL AND BALSAM, PERU 1 APPLICATION: 788; 87 OINTMENT TOPICAL at 08:40

## 2024-02-06 NOTE — PLAN OF CARE
Goal Outcome Evaluation:  Plan of Care Reviewed With: patient        Progress: improving  Outcome Evaluation: Skin care done, cream applied to coccyx, abdomen folds and heels per order. Pw changed. VSS. Refused heel boots but pillow x2 applied under legs. PRN atarax and Norco given. All needs met, call light within reach. Bed alarm on.

## 2024-02-06 NOTE — PLAN OF CARE
Goal Outcome Evaluation:  Plan of Care Reviewed With: patient    Progress: improving  Outcome Evaluation: Ms. Guidry admitted on 1/27 for sepsis. C/o bilateral leg pain - PRN Norco given 2x. 1x dose (5mg) Amlodipine given for HTN. Wound care provided, up in chair, waffle cushion in place. PRN Atarax given 1x for anxiety. Lantus and SS insulin increased. Contact spore precautions maintained - Dificid continued. Plan is discharge once Dificid completed. Patient stable and needs met at this time.

## 2024-02-06 NOTE — CASE MANAGEMENT/SOCIAL WORK
Continued Stay Note  [facility]     Patient Name: Halie Guidry  MRN: [id number]  Today's Date: 2/6/2024    Admit Date: 1/27/2024    Plan: New SNF referrals pending. From [facility] SNF Does not want to return   Discharge Plan       Row Name 02/06/24 1702       Plan    Plan New SNF referrals pending. From [facility] SNF Does not want to return    Patient/Family in Agreement with Plan yes    Plan Comments Met with pt at bedside to discuss D/C plan. Pt states her son does not want her to return to [facility]. Discussed SNF choices with pt. Pt requests referral to #1 [place] [facility] [place] and #2 [place]. Referrals placed and pending. Tobias Hicks RN-BC                   Discharge Codes    No documentation.                 Expected Discharge Date and Time       Expected Discharge Date Expected Discharge Time    Feb 10, 2024               Tobias Hicks RN

## 2024-02-06 NOTE — NURSING NOTE
02/06/24 1101   Wound 01/27/24 Bilateral medial sacral spine Pressure Injury   Placement Date: 01/27/24   Present on Original Admission: Yes  Side: Bilateral  Orientation: medial  Location: sacral spine  Primary Wound Type: Pressure Injury   Base closed/resurfaced;pink;red;non-blanchable  (perineal/ buttocks skin has greatly improved since last assessment, reepithelializing well, remains pink)   Periwound intact;blanchable;dry   Periwound Temperature warm   Periwound Skin Turgor soft   Drainage Amount none   Care, Wound barrier applied   Dressing Care open to air   Skin Interventions   Pressure Reduction Devices specialty bed utilized;chair cushion utilized     CWON note: pt seen for follow up evaluation of  skin issues POA. She is alert and oriented, sitting in a chair, she is able to stand with a walker so I can assess her buttocks and sacral skin. She has a waffle cushion in the chair and a BRETT Mattress from [facility] for adequate pressure redistribution and control of microclimate. Continue Calmoseptine ointment with ABD pad over her sacral skin as ordered. Catarino LE remain slightly edematous and red, she reports they are painful and would not let me remove her socks at this time to assess her feet. She reports staff are painting wounds with betadine daily. Please re-consult for any additional needs.

## 2024-02-06 NOTE — PROGRESS NOTES
Name: Halie Guidry ADMIT: 2024   : 1940  PCP: Luis Felipe Flowers MD    MRN: [id number] LOS: 10 days   AGE/SEX: 83 y.o. female  ROOM: [facility]     Subjective   Subjective   Patient seen and examined this morning.  Hospital day 10.  She is awake and sitting up in chair next to bedside, states she is doing well today, no acute complaints at present, abdominal pain and diarrhea continue to improve.        Objective   Objective   Vital Signs  Temp:  [97.9 °F (36.6 °C)-99 °F (37.2 °C)] 98.4 °F (36.9 °C)  Heart Rate:  [64-72] 64  Resp:  [17-18] 18  BP: (138-163)/(58-70) 163/58  SpO2:  [100 %] 100 %  on   ;   Device (Oxygen Therapy): room air  Body mass index is 34.83 kg/m².  Physical Exam  Vitals and nursing note reviewed.   Constitutional:       General: She is not in acute distress.     Appearance: She is ill-appearing.   Cardiovascular:      Rate and Rhythm: Normal rate and regular rhythm.      Pulses: Normal pulses.      Heart sounds: Normal heart sounds.   Pulmonary:      Effort: Pulmonary effort is normal. No respiratory distress.      Breath sounds: Normal breath sounds.   Abdominal:      General: Bowel sounds are normal. There is no distension.      Palpations: Abdomen is soft.      Tenderness: There is abdominal tenderness (mild to palpation, improved from prior).   Skin:     General: Skin is warm and dry.      Coloration: Skin is pale.   Neurological:      Mental Status: She is alert.       Results Review     I reviewed the patient's new clinical results.  Results from last 7 days   Lab Units 24  04324  0630 24  0405   WBC 10*3/mm3 13.13* 12.72* 16.92* 17.66*   HEMOGLOBIN g/dL 10.9* 9.7* 10.2* 10.1*   PLATELETS 10*3/mm3 283 255 252 256     Results from last 7 days   Lab Units 24  0437 24  0630 24  1538 24  0522 24  0405   SODIUM mmol/L 140 142  --  142 140   POTASSIUM mmol/L 4.0 4.2 4.1 3.3* 3.7   CHLORIDE mmol/L 104 108*  --   108* 107   CO2 mmol/L 22.8 21.9*  --  23.0 21.1*   BUN mg/dL 21 16  --  16 16   CREATININE mg/dL 1.19* 1.17*  --  1.02* 0.98   GLUCOSE mg/dL 221* 214*  --  145* 187*   EGFR mL/min/1.73 45.5* 46.4*  --  54.7* 57.4*     Results from last 7 days   Lab Units 02/03/24 0405 02/02/24 0439 02/01/24 0421 01/31/24  0450   ALBUMIN g/dL 2.1* 2.2* 2.0* 2.2*   BILIRUBIN mg/dL  --   --   --  0.3   ALK PHOS U/L  --   --   --  93   AST (SGOT) U/L  --   --   --  19   ALT (SGPT) U/L  --   --   --  7     Results from last 7 days   Lab Units 02/06/24 0437 02/05/24 0630 02/04/24 0522 02/03/24 0405 02/02/24 0439 02/01/24 0421 01/31/24  0450   CALCIUM mg/dL 8.1* 7.7* 7.6* 7.6* 7.6* 7.7* 7.4*   ALBUMIN g/dL  --   --   --  2.1* 2.2* 2.0* 2.2*   MAGNESIUM mg/dL  --   --   --  1.8 2.0 1.6 2.0   PHOSPHORUS mg/dL  --   --   --  3.9 2.4* 2.6 2.7           Glucose   Date/Time Value Ref Range Status   02/06/2024 1157 198 (H) 70 - 130 mg/dL Final   02/06/2024 0814 237 (H) 70 - 130 mg/dL Final   02/05/2024 2032 315 (H) 70 - 130 mg/dL Final   02/05/2024 1734 246 (H) 70 - 130 mg/dL Final   02/05/2024 1218 255 (H) 70 - 130 mg/dL Final   02/05/2024 0824 302 (H) 70 - 130 mg/dL Final   02/04/2024 2039 226 (H) 70 - 130 mg/dL Final       No radiology results for the last day    I have personally reviewed all medications:  Scheduled Medications  amLODIPine, 5 mg, Oral, Q24H  atorvastatin, 80 mg, Oral, Nightly  bisoprolol, 5 mg, Oral, Daily  castor oil-balsam peru, 1 Application, Topical, Q12H  famotidine, 20 mg, Oral, Daily  fidaxomicin, 200 mg, Oral, Q12H  furosemide, 20 mg, Oral, BID  insulin glargine, 21 Units, Subcutaneous, Nightly  insulin lispro, 11 Units, Subcutaneous, TID With Meals  insulin lispro, 2-7 Units, Subcutaneous, 4x Daily AC & at Bedtime  levothyroxine, 112 mcg, Oral, Once per day on Monday Tuesday Wednesday Thursday Friday Saturday  levothyroxine, 168 mcg, Oral, Weekly  Menthol-Zinc Oxide, 1 Application, Topical, 4x  Daily  multivitamin with minerals, 1 tablet, Oral, Daily  potassium chloride, 20 mEq, Oral, Daily  pregabalin, 50 mg, Oral, Daily  cyanocobalamin, 1,000 mcg, Oral, Daily    Infusions  Pharmacy Consult,     Diet  Diet: Cardiac Diets, Diabetic Diets; Healthy Heart (2-3 Na+); Consistent Carbohydrate; Texture: Regular Texture (IDDSI 7); Fluid Consistency: Thin (IDDSI 0)    I have personally reviewed:  [x]  Laboratory   [x]  Microbiology   [x]  Radiology   [x]  EKG/Telemetry  [x]  Cardiology/Vascular   []  Pathology    []  Records       Assessment/Plan     Active Hospital Problems    Diagnosis  POA    **Sepsis [A41.9]  Yes    Stage 3a chronic kidney disease [N18.31]  Yes    Metabolic encephalopathy [G93.41]  Yes    Pressure injury of buttock, stage 2 [L89.302]  Yes    C. difficile diarrhea [A04.72]  Yes    Neuropathic pain [M79.2]  Yes    Acute UTI (urinary tract infection) [N39.0]  Yes    VIPUL (acute kidney injury) [N17.9]  Yes    Seizure [R56.9]  Yes    Hyperlipidemia [E78.5]  Yes    Type 2 diabetes mellitus with hyperglycemia, with long-term current use of insulin [E11.65, Z79.4]  Not Applicable    Benign essential hypertension [I10]  Yes      Resolved Hospital Problems   No resolved problems to display.       83 y.o. female admitted with Sepsis.    Sepsis  Recurrent C. Difficile colitis  - ID consulted and has followed. Patient has been on Vancomycin, but Dificid has now been approved on 01/31, so Vancomycin was stopped and Dificid was started (01/31/24), she remains on this at present. WBC remains elevated, but improved overall.  Symptoms continue to improve.  - ID plans for 10-day course, and after completion, ID recommending daily probiotic x 3 months. Discussed with CCP, she will not be able to take this medication at rehab, so will have to stay here to complete antibiotic course. Should be ready to be discharged by this weekend. Appreciate ID help, continue medication as prescribed.  - Order repeat CBC in AM for  reassessment of WBC count.    Acute kidney injury  - Etiology most likely pre-renal in nature, due to volume depletion, decreased PO intake, impaired autoregulation from Lisinopril. Nephrology has followed, creatinine peaked at 4.99, has received IVF, which has led to improvement. They signed off (02/04), greatly appreciate their help.  - Diuretics added back (02/04) see discussion elsewhere but today (02/06)  creatinine slightly worse again today, going to change furosemide back to daily rather than BID and see if this helps. If no improvement noted, may need to ask Nephrology to see again to assist further.  - Order repeat BMP in AM for reassessment. Closely monitor renal function.    Type 2 diabetes with hyperglycemia  - Most recent A1c: 8.20% (12/24/23).  - Current treatment regimen: Glargine 21 units nightly, lispro 11 units TID with meals, correctional SSI; Glucose uncontrolled around mealtimes, warrants adjustment.  - Increase Glargine to 25 units nightly, increase lispro to 13 units TID with meals, continue correctional SSI.  - Will monitor 24 hour insulin requirements and adjust as needed to achieve target inpatient range of 140-180.    Hypertension  - Blood pressure is elevated more on 02/04, with SBP >180, however, patient asymptomatic, consistent with urgency, but it is transient elevation.  - (02/04): Discussed with Nephrology, resumed home furosemide but did 20 mg BID rather than 20 mg daily she was on previously, and resumed home Amlodipine but at lower dose of 5 mg daily rather than 10 she is on at home for now. Added supplemental potassium in setting of diuretic.  - (02/06): Blood pressure remains elevated, need to adjust medications further. Increase Amlodipine to 10 mg daily. Decreasing Furosemide as discussed elsewhere due to renal function.  - Trend BP to guide ongoing management decisions. Continue to hold home Lisinopril. May need to add another agent aside from diuretic if BP continues to be  elevated.    Anemia  - Hemoglobin low on most recent labs, however, relatively stable from prior. No evidence of overt blood loss. No indications for acute intervention at this time.    - Order repeat CBC in AM for reassessment. Continue to monitor, transfuse for hemoglobin <7.    Anxiety  - Endorsed by patient on 02/02, given PRN Hydroxyzine, which has helped symptoms. Continue as prescribed.     Hypothyroidism  - Continue levothyroxine as prescribed.    Hyperlipidemia  - Continue Statin as prescribed.    Hypokalemia, resolved  - K low on prior labs, repleted via electrolyte protocol. Follow up repeat labs to guide ongoing management decisions. Replete PRN per protocol. Continue to monitor    Hypophosphatemia, resolved  - Noted on prior labs, improved on most recent testing.    SCDs for DVT prophylaxis.  Limited code (no CPR, no intubation).  Discussed with patient, nursing staff, CCP, and care team on multidisciplinary rounds.  Anticipate discharge to SNU facility later this week.      Zachariah Ivory DO  [facility]  02/06/24

## 2024-02-07 LAB
ANION GAP SERPL CALCULATED.3IONS-SCNC: 14.7 MMOL/L (ref 5–15)
BASOPHILS # BLD AUTO: 0.12 10*3/MM3 (ref 0–0.2)
BASOPHILS NFR BLD AUTO: 1 % (ref 0–1.5)
BUN SERPL-MCNC: 22 MG/DL (ref 8–23)
BUN/CREAT SERPL: 18.6 (ref 7–25)
CALCIUM SPEC-SCNC: 7.7 MG/DL (ref 8.6–10.5)
CHLORIDE SERPL-SCNC: 105 MMOL/L (ref 98–107)
CO2 SERPL-SCNC: 21.3 MMOL/L (ref 22–29)
CREAT SERPL-MCNC: 1.18 MG/DL (ref 0.57–1)
DEPRECATED RDW RBC AUTO: 49.6 FL (ref 37–54)
EGFRCR SERPLBLD CKD-EPI 2021: 45.9 ML/MIN/1.73
EOSINOPHIL # BLD AUTO: 0.47 10*3/MM3 (ref 0–0.4)
EOSINOPHIL NFR BLD AUTO: 3.8 % (ref 0.3–6.2)
ERYTHROCYTE [DISTWIDTH] IN BLOOD BY AUTOMATED COUNT: 16.1 % (ref 12.3–15.4)
GLUCOSE BLDC GLUCOMTR-MCNC: 176 MG/DL (ref 70–130)
GLUCOSE BLDC GLUCOMTR-MCNC: 178 MG/DL (ref 70–130)
GLUCOSE BLDC GLUCOMTR-MCNC: 210 MG/DL (ref 70–130)
GLUCOSE BLDC GLUCOMTR-MCNC: 253 MG/DL (ref 70–130)
GLUCOSE SERPL-MCNC: 245 MG/DL (ref 65–99)
HCT VFR BLD AUTO: 30.4 % (ref 34–46.6)
HGB BLD-MCNC: 9.6 G/DL (ref 12–15.9)
IMM GRANULOCYTES # BLD AUTO: 0.13 10*3/MM3 (ref 0–0.05)
IMM GRANULOCYTES NFR BLD AUTO: 1.1 % (ref 0–0.5)
LYMPHOCYTES # BLD AUTO: 1.93 10*3/MM3 (ref 0.7–3.1)
LYMPHOCYTES NFR BLD AUTO: 15.7 % (ref 19.6–45.3)
MCH RBC QN AUTO: 27.2 PG (ref 26.6–33)
MCHC RBC AUTO-ENTMCNC: 31.6 G/DL (ref 31.5–35.7)
MCV RBC AUTO: 86.1 FL (ref 79–97)
MONOCYTES # BLD AUTO: 0.84 10*3/MM3 (ref 0.1–0.9)
MONOCYTES NFR BLD AUTO: 6.8 % (ref 5–12)
NEUTROPHILS NFR BLD AUTO: 71.6 % (ref 42.7–76)
NEUTROPHILS NFR BLD AUTO: 8.79 10*3/MM3 (ref 1.7–7)
NRBC BLD AUTO-RTO: 0 /100 WBC (ref 0–0.2)
PLATELET # BLD AUTO: 295 10*3/MM3 (ref 140–450)
PMV BLD AUTO: 9.5 FL (ref 6–12)
POTASSIUM SERPL-SCNC: 4 MMOL/L (ref 3.5–5.2)
RBC # BLD AUTO: 3.53 10*6/MM3 (ref 3.77–5.28)
SODIUM SERPL-SCNC: 141 MMOL/L (ref 136–145)
WBC NRBC COR # BLD AUTO: 12.28 10*3/MM3 (ref 3.4–10.8)

## 2024-02-07 PROCEDURE — 80048 BASIC METABOLIC PNL TOTAL CA: CPT | Performed by: STUDENT IN AN ORGANIZED HEALTH CARE EDUCATION/TRAINING PROGRAM

## 2024-02-07 PROCEDURE — 97530 THERAPEUTIC ACTIVITIES: CPT

## 2024-02-07 PROCEDURE — 85025 COMPLETE CBC W/AUTO DIFF WBC: CPT | Performed by: STUDENT IN AN ORGANIZED HEALTH CARE EDUCATION/TRAINING PROGRAM

## 2024-02-07 PROCEDURE — 63710000001 INSULIN LISPRO (HUMAN) PER 5 UNITS: Performed by: STUDENT IN AN ORGANIZED HEALTH CARE EDUCATION/TRAINING PROGRAM

## 2024-02-07 PROCEDURE — 63710000001 INSULIN LISPRO (HUMAN) PER 5 UNITS: Performed by: INTERNAL MEDICINE

## 2024-02-07 PROCEDURE — 82948 REAGENT STRIP/BLOOD GLUCOSE: CPT

## 2024-02-07 PROCEDURE — 63710000001 INSULIN GLARGINE PER 5 UNITS: Performed by: STUDENT IN AN ORGANIZED HEALTH CARE EDUCATION/TRAINING PROGRAM

## 2024-02-07 RX ADMIN — HYDROCODONE BITARTRATE AND ACETAMINOPHEN 1 TABLET: 10; 325 TABLET ORAL at 21:27

## 2024-02-07 RX ADMIN — ANORECTAL OINTMENT 1 APPLICATION: 15.7; .44; 24; 20.6 OINTMENT TOPICAL at 17:46

## 2024-02-07 RX ADMIN — Medication 1000 MCG: at 08:36

## 2024-02-07 RX ADMIN — BISOPROLOL FUMARATE 5 MG: 5 TABLET, FILM COATED ORAL at 08:37

## 2024-02-07 RX ADMIN — AMLODIPINE BESYLATE 10 MG: 10 TABLET ORAL at 08:37

## 2024-02-07 RX ADMIN — HYDROXYZINE HYDROCHLORIDE 10 MG: 10 TABLET ORAL at 08:44

## 2024-02-07 RX ADMIN — INSULIN LISPRO 3 UNITS: 100 INJECTION, SOLUTION INTRAVENOUS; SUBCUTANEOUS at 12:26

## 2024-02-07 RX ADMIN — FUROSEMIDE 20 MG: 20 TABLET ORAL at 08:36

## 2024-02-07 RX ADMIN — FIDAXOMICIN 200 MG: 200 TABLET, FILM COATED ORAL at 08:36

## 2024-02-07 RX ADMIN — ANORECTAL OINTMENT 1 APPLICATION: 15.7; .44; 24; 20.6 OINTMENT TOPICAL at 08:40

## 2024-02-07 RX ADMIN — Medication 3 MG: at 21:27

## 2024-02-07 RX ADMIN — HYDROXYZINE HYDROCHLORIDE 10 MG: 10 TABLET ORAL at 21:27

## 2024-02-07 RX ADMIN — INSULIN LISPRO 2 UNITS: 100 INJECTION, SOLUTION INTRAVENOUS; SUBCUTANEOUS at 17:45

## 2024-02-07 RX ADMIN — INSULIN GLARGINE 25 UNITS: 100 INJECTION, SOLUTION SUBCUTANEOUS at 21:27

## 2024-02-07 RX ADMIN — FAMOTIDINE 20 MG: 20 TABLET, FILM COATED ORAL at 08:39

## 2024-02-07 RX ADMIN — CASTOR OIL AND BALSAM, PERU 1 APPLICATION: 788; 87 OINTMENT TOPICAL at 08:39

## 2024-02-07 RX ADMIN — Medication 1 TABLET: at 08:37

## 2024-02-07 RX ADMIN — ANORECTAL OINTMENT 1 APPLICATION: 15.7; .44; 24; 20.6 OINTMENT TOPICAL at 12:27

## 2024-02-07 RX ADMIN — ANORECTAL OINTMENT 1 APPLICATION: 15.7; .44; 24; 20.6 OINTMENT TOPICAL at 21:27

## 2024-02-07 RX ADMIN — ATORVASTATIN CALCIUM 80 MG: 80 TABLET, FILM COATED ORAL at 21:27

## 2024-02-07 RX ADMIN — INSULIN LISPRO 13 UNITS: 100 INJECTION, SOLUTION INTRAVENOUS; SUBCUTANEOUS at 12:27

## 2024-02-07 RX ADMIN — INSULIN LISPRO 4 UNITS: 100 INJECTION, SOLUTION INTRAVENOUS; SUBCUTANEOUS at 08:39

## 2024-02-07 RX ADMIN — CASTOR OIL AND BALSAM, PERU 1 APPLICATION: 788; 87 OINTMENT TOPICAL at 21:27

## 2024-02-07 RX ADMIN — LEVOTHYROXINE SODIUM 112 MCG: 112 TABLET ORAL at 06:32

## 2024-02-07 RX ADMIN — INSULIN LISPRO 13 UNITS: 100 INJECTION, SOLUTION INTRAVENOUS; SUBCUTANEOUS at 17:45

## 2024-02-07 RX ADMIN — POTASSIUM CHLORIDE 20 MEQ: 750 TABLET, EXTENDED RELEASE ORAL at 08:37

## 2024-02-07 RX ADMIN — FIDAXOMICIN 200 MG: 200 TABLET, FILM COATED ORAL at 21:27

## 2024-02-07 RX ADMIN — INSULIN LISPRO 2 UNITS: 100 INJECTION, SOLUTION INTRAVENOUS; SUBCUTANEOUS at 21:27

## 2024-02-07 RX ADMIN — INSULIN LISPRO 13 UNITS: 100 INJECTION, SOLUTION INTRAVENOUS; SUBCUTANEOUS at 08:38

## 2024-02-07 RX ADMIN — PREGABALIN 50 MG: 50 CAPSULE ORAL at 08:39

## 2024-02-07 NOTE — THERAPY TREATMENT NOTE
Patient Name: Halie Guidry  : 1940    MRN: [id number]                              Today's Date: 2024       Admit Date: 2024    Visit Dx:     ICD-10-CM ICD-9-CM   1. Sepsis with acute renal failure, due to unspecified organism, unspecified acute renal failure type, unspecified whether septic shock present  A41.9 038.9    R65.20 995.92    N17.9 584.9   2. Encephalopathy  G93.40 348.30     Patient Active Problem List   Diagnosis    Compression fracture    Lumbar degenerative disc disease    Type 2 diabetes mellitus with hyperglycemia, with long-term current use of insulin    Hyperlipidemia    Benign essential hypertension    Primary hypothyroidism    Neuropathy    Osteoporosis    Proteinuria    Tobacco abuse    Diabetic eye exam    Generalized weakness    Spinal stenosis    Scoliosis    Arthritis    VBI (vertebrobasilar insufficiency)    Orthostatic hypotension    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    Severe hypothyroidism    Noncompliance with medication regimen    Vitamin D deficiency disease    Altered mental status    Tremor    Seizure    Stage 3a chronic kidney disease    Thoracic degenerative disc disease    Metabolic encephalopathy    VIPLU (acute kidney injury)    Hyperglycemia    Type II diabetes mellitus, uncontrolled    Lower abdominal pain    Transaminitis    UTI (urinary tract infection)    Emphysematous cystitis    Choledocholithiasis    Hypokalemia    Hypoxia    Generalized abdominal pain    History of Clostridium difficile infection    History of ERCP    Acute UTI (urinary tract infection)    Hypomagnesemia    Burning pain    Anxiety disorder    Neuropathic pain    Intertrigo    Weakness of both lower extremities    Accelerated hypertension    Acute cystitis without hematuria    Altered mental status, unspecified altered mental status type    Left lower lobe pneumonia    Acute pain of left foot    Cellulitis and abscess of left lower extremity    Hypertensive  kidney disease with stage 3a chronic kidney disease    Bilateral leg pain    Peripheral edema    Primary malignant neuroendocrine tumor of pancreas    Encounter for long-term (current) use of high-risk medication    Diabetic foot ulcer    Acute renal insufficiency    C. difficile diarrhea    Sepsis    Stage 3a chronic kidney disease    Metabolic encephalopathy    Pressure injury of buttock, stage 2     Past Medical History:   Diagnosis Date    Acute metabolic encephalopathy 6/24/2022    Anxiety     Arthritis     Benign essential hypertension 08/20/2014    Bleeding disorder     Depression     Diabetes     Diabetes mellitus, type 2     Disc degeneration, lumbar     Headache, tension-type     Hyperlipidemia     Hypothyroidism     Neuropathy     Osteoporosis 09/09/2015    Peripheral neuropathy     Rotator cuff tear, left     Scoliosis     Shoulder pain     LEFT, TORN ROTATOR CUFF S/P FALL    Spinal stenosis      Past Surgical History:   Procedure Laterality Date    APPENDECTOMY      BILATERAL BREAST REDUCTION Bilateral 08/2015    CATARACT EXTRACTION  03/2015    CHOLECYSTECTOMY WITH INTRAOPERATIVE CHOLANGIOGRAM N/A 3/27/2022    Procedure: CHOLECYSTECTOMY LAPAROSCOPIC INTRAOPERATIVE CHOLANGIOGRAM;  Surgeon: Aiyana Hill MD;  Location: [facility];  Service: General;  Laterality: N/A;    COLONOSCOPY  06/05/2015    WNL    ERCP N/A 2/25/2022    Procedure: ENDOSCOPIC RETROGRADE CHOLANGIOPANCREATOGRAPHY with sphincterotomy and balloon sweep;  Surgeon: Chilo Wilhelm MD;  Location: [facility] ENDOSCOPY;  Service: Gastroenterology;  Laterality: N/A;  PRE/POST - CBD stones    ERCP N/A 3/28/2022    Procedure: ENDOSCOPIC RETROGRADE CHOLANGIOPANCREATOGRAPHY WITH SPHINCTEROTOMY AND BALLOON SWEEP;  Surgeon: Chilo Wilhelm MD;  Location: [facility] ENDOSCOPY;  Service: Gastroenterology;  Laterality: N/A;  PRE: COMMON DUCT STONE  POST: COMMON DUCT STONE    KYPHOPLASTY      REDUCTION MAMMAPLASTY      TONSILLECTOMY         General Information       Row Name 02/07/24 1622          OT Time and Intention    Document Type therapy note (daily note)  -SELAM     Mode of Treatment occupational therapy;physical therapy;co-treatment  -SELAM       Row Name 02/07/24 1622          General Information    Patient Profile Reviewed yes  -SELAM     Existing Precautions/Restrictions fall  -SELAM       Row Name 02/07/24 1622          Cognition    Orientation Status (Cognition) oriented x 3  -SELAM       Row Name 02/07/24 1622          Safety Issues, Functional Mobility    Impairments Affecting Function (Mobility) balance;endurance/activity tolerance;strength;range of motion (ROM)  -SELAM     Comment, Safety Issues/Impairments (Mobility) PT/OT cotreatment medically appropriate and necessary due to patient acuity level, to maximize therapeutic benefit due to impaired act tolerance, and for safety of patient and staff. Treatment focused on progression of care and goals established in POC.  -SELAM               User Key  (r) = Recorded By, (t) = Taken By, (c) = Cosigned By      Initials Name Provider Type    SELAM Radha So OT Occupational Therapist                     Mobility/ADL's       Row Name 02/07/24 1622          Bed Mobility    Comment, (Bed Mobility) UIC on arrival  -SELAM       Row Name 02/07/24 1622          Transfers    Transfers sit-stand transfer  -SELAM     Comment, (Transfers) x 2 STS from chair  -SELAM       Row Name 02/07/24 1622          Sit-Stand Transfer    Sit-Stand [place] (Transfers) maximum assist (25% patient effort);moderate assist (50% patient effort);2 person assist;verbal cues;nonverbal cues (demo/gesture)  -SELAM     Assistive Device (Sit-Stand Transfers) walker, front-wheeled  -SELAM     Comment, (Sit-Stand Transfer) MAX A x2 on initial STS and MOD Ax2 2nd STS from chair with cues for upright posture with posterior lean in standing  -SELAM       Row Name 02/07/24 1622          Functional Mobility    Functional Mobility- Comment Unable to take steps this  date d/t posterior lean in standing and fatigue  -SELAM       Row Name 02/07/24 1622          Activities of Daily Living    BADL Assessment/Intervention lower body dressing  -SELAM       Row Name 02/07/24 1622          Lower Body Dressing Assessment/Training    [place]ha Level (Lower Body Dressing) lower body dressing skills;don;socks;dependent (less than 25% patient effort);doff  -SELAM     Position (Lower Body Dressing) other (see comments)  UIC  -SELAM               User Key  (r) = Recorded By, (t) = Taken By, (c) = Cosigned By      Initials Name Provider Type    Radha Soni OT Occupational Therapist                   Obj/Interventions       Row Name 02/07/24 1626          Elbow/Forearm (Therapeutic Exercise)    Elbow/Forearm (Therapeutic Exercise) strengthening exercise  -SELAM     Elbow/Forearm AROM (Therapeutic Exercise) sitting  -SELAM     Elbow/Forearm Strengthening (Therapeutic Exercise) resistance band;yellow;15 repititions;2 sets;bilateral;flexion;extension;sitting  -SELAM       Row Name 02/07/24 1626          Motor Skills    Motor Skills functional endurance  -SELAM     Functional Endurance Fair; fatigues quickly and increased time to complete all functional tasks  -SELAM     Therapeutic Exercise elbow/forearm  -SELAM       Row Name 02/07/24 1626          Balance    Balance Assessment sitting static balance;sitting dynamic balance;standing static balance  -SELAM     Static Sitting Balance standby assist  -SELAM     Dynamic Sitting Balance standby assist  -SELAM     Position, Sitting Balance sitting in chair;unsupported  -SELAM     Static Standing Balance 2-person assist;maximum assist  -SELAM     Balance Interventions sitting;standing;UE activity with balance activity;static;dynamic;supported  -SELAM     Comment, Balance Posterior lean in standing with forward flexed posture and cues for upright posture  -SELAM               User Key  (r) = Recorded By, (t) = Taken By, (c) = Cosigned By      Initials Name Provider Type    Radha Soni, OT  Occupational Therapist                   Goals/Plan    No documentation.                  Clinical Impression       Row Name 02/07/24 1629          Pain Assessment    Pain Location - Side/Orientation Bilateral  -SELAM     Pain Location lower  -SELAM     Pain Location - extremity  -SELAM     Pre/Posttreatment Pain Comment c/o pain in BLE with redness and edema but did not rate on numeric pain scale.  -SELAM     Pain Intervention(s) Repositioned;Ambulation/increased activity;Rest  -SELAM       Row Name 02/07/24 1629          Plan of Care Review    Plan of Care Reviewed With patient  -SELAM     Progress no change  -SELAM     Outcome Evaluation Pt UIC laying head on tray table on OT/PT arrival and agreeable to treatment. Pt MOD A/MAX x2 STS from chair with FWWx and cues for upright posture and posterior lean noted and MAX Ax2 for standing balance with c/o BLE pain. Pt fatigues quickly and unable attempt functional mobility. Pt tolerated BUE ther ex with red T band sitting UI and educated on increasing reps and to perform 3x/day to promote increased strength and endurance. Pt reclined with legs up on departure.  -SELAM       Row Name 02/07/24 1629          Therapy Assessment/Plan (OT)    Therapy Frequency (OT) 5 times/wk  -SELAM       Row Name 02/07/24 1629          Therapy Plan Review/Discharge Plan (OT)    Anticipated Discharge Disposition (OT) skilled nursing facility;extended care facility  -SELAM       Row Name 02/07/24 1629          Positioning and Restraints    Pre-Treatment Position sitting in chair/recliner  -SELAM     Post Treatment Position chair  -SELAM     In Chair reclined;call light within reach;encouraged to call for assist;exit alarm on;legs elevated  -SELAM               User Key  (r) = Recorded By, (t) = Taken By, (c) = Cosigned By      Initials Name Provider Type    Radha Soni, [place]OM[place]S Occupational Therapist                   Outcome Measures       Row Name 02/07/24 1634          How much help from another is currently needed...     Putting on and taking off regular lower body clothing? 1  -SELAM     Bathing (including washing, rinsing, and drying) 2  -SELAM     Toileting (which includes using toilet bed pan or urinal) 1  -ESLAM     Putting on and taking off regular upper body clothing 3  -SELAM     Taking care of personal grooming (such as brushing teeth) 3  -SELAM     Eating meals 3  -SELAM     AM-PAC 6 Clicks Score (OT) 13  -SELAM       Row Name 02/07/24 1116          How much help from another person do you currently need...    Turning from your back to your side while in flat bed without using bedrails? 2  -EJ (r) AK (t) EJ (c)     Moving from lying on back to sitting on the side of a flat bed without bedrails? 2  -EJ (r) AK (t) EJ (c)     Moving to and from a bed to a chair (including a wheelchair)? 2  -EJ (r) AK (t) EJ (c)     Standing up from a chair using your arms (e.g., wheelchair, bedside chair)? 2  -EJ (r) AK (t) EJ (c)     Climbing 3-5 steps with a railing? 1  -EJ (r) AK (t) EJ (c)     To walk in hospital room? 2  -EJ (r) AK (t) EJ (c)     AM-PAC 6 Clicks Score (PT) 11  -EJ (r) AK (t)     Highest Level of Mobility Goal 4 --> Transfer to chair/commode  -EJ (r) AK (t)       Row Name 02/07/24 1634 02/07/24 1116       Functional Assessment    Outcome Measure Options AM-PAC 6 Clicks Daily Activity (OT)  -SELAM AM-PAC 6 Clicks Basic Mobility (PT)  -EJ (r) AK (t) EJ (c)              User Key  (r) = Recorded By, (t) = Taken By, (c) = Cosigned By      Initials Name Provider Type    Vashti Collier, PT Physical Therapist    Radha Soni, OT Occupational Therapist    Alyse Bruce, PT Student PT Student                    Occupational Therapy Education       Title: PT OT SLP Therapies (Done)       Topic: Occupational Therapy (Done)       Point: ADL training (Done)       Description:   Instruct learner(s) on proper safety adaptation and remediation techniques during self care or transfers.   Instruct in proper use of assistive devices.                   Learning Progress Summary             Patient Acceptance, E,TB, VU by LM at 2/4/2024 1830    Acceptance, TB,E, VU by LM at 2/3/2024 1714    Acceptance, E, VU by SELAM at 1/29/2024 1702    Comment: Role of OT and safety awareness                         Point: Home exercise program (Done)       Description:   Instruct learner(s) on appropriate technique for monitoring, assisting and/or progressing therapeutic exercises/activities.                  Learning Progress Summary             Patient Acceptance, E,TB, VU by LM at 2/4/2024 1830    Acceptance, TB,E, VU by LM at 2/3/2024 1714    Acceptance, E, VU by SELAM at 1/29/2024 1702    Comment: Role of OT and safety awareness                         Point: Precautions (Done)       Description:   Instruct learner(s) on prescribed precautions during self-care and functional transfers.                  Learning Progress Summary             Patient Acceptance, E,TB, VU by LM at 2/4/2024 1830    Acceptance, TB,E, VU by LM at 2/3/2024 1714    Acceptance, E, VU by SELAM at 1/29/2024 1702    Comment: Role of OT and safety awareness                         Point: Body mechanics (Done)       Description:   Instruct learner(s) on proper positioning and spine alignment during self-care, functional mobility activities and/or exercises.                  Learning Progress Summary             Patient Acceptance, E,TB, VU by LM at 2/4/2024 1830    Acceptance, TB,E, VU by RAKESH at 2/3/2024 1714    Acceptance, E, VU by SELAM at 1/29/2024 1702    Comment: Role of OT and safety awareness                                         User Key       Initials Effective Dates Name Provider Type Discipline     06/16/21 -  Margarita Lang, RN Registered Nurse Nurse    SELAM 10/12/23 -  Radha So OT Occupational Therapist OT                  OT Recommendation and Plan  Planned Therapy Interventions (OT): activity tolerance training, BADL retraining, functional balance retraining, occupation/activity based  No interventions, patient/caregiver education/training, strengthening exercise, transfer/mobility retraining, adaptive equipment training, passive ROM/stretching, ROM/therapeutic exercise  Therapy Frequency (OT): 5 times/wk  Plan of Care Review  Plan of Care Reviewed With: patient  Progress: no change  Outcome Evaluation: Pt UIC laying head on tray table on OT/PT arrival and agreeable to treatment. Pt MOD A/MAX x2 STS from chair with FWWx and cues for upright posture and posterior lean noted and MAX Ax2 for standing balance with c/o BLE pain. Pt fatigues quickly and unable attempt functional mobility. Pt tolerated BUE ther ex with red T band sitting UIC and educated on increasing reps and to perform 3x/day to promote increased strength and endurance. Pt reclined with legs up on departure.     Time Calculation:   Evaluation Complexity (OT)  Review Occupational Profile/Medical/Therapy History Complexity: brief/low complexity  Assessment, Occupational Performance/Identification of Deficit Complexity: 1-3 performance deficits  Clinical Decision Making Complexity (OT): problem focused assessment/low complexity  Overall Complexity of Evaluation (OT): low complexity     Time Calculation- OT       Row Name 02/07/24 1635             Time Calculation- OT    OT Start Time 1041  -SELAM      OT Stop Time 1059  -SELAM      OT Time Calculation (min) 18 min  -SELAM      Total Timed Code Minutes- OT 18 minute(s)  -SELAM      OT Received On 02/07/24  -SELAM      OT - Next Appointment 02/08/24  -SELAM         Timed Charges    87968 - OT Therapeutic Activity Minutes 18  -SELAM         Total Minutes    Timed Charges Total Minutes 18  -SELAM       Total Minutes 18  -SELAM                User Key  (r) = Recorded By, (t) = Taken By, (c) = Cosigned By      Initials Name Provider Type    Radha Soni OT Occupational Therapist                  Therapy Charges for Today       Code Description Service Date Service Provider Modifiers Qty    [id number] HC OT THERAPEUTIC  ACT EA 15 MIN 2/7/2024 Radha So, OT GO 1                 Radha So, OT  2/7/2024

## 2024-02-07 NOTE — PLAN OF CARE
Problem: Adult Inpatient Plan of Care  Goal: Plan of Care Review  Outcome: Ongoing, Progressing  Flowsheets (Taken 2/7/2024 7826)  Progress: improving  Plan of Care Reviewed With: patient  Outcome Evaluation: Vitals stable. Up to chair. Worked with PT and OT. Skin care provided. Plan for SNF this weekend once Dificid tx complete. Plan of care ongoing.

## 2024-02-07 NOTE — THERAPY TREATMENT NOTE
Patient Name: Halie Guidry  : 1940    MRN: [id number]                              Today's Date: 2024       Admit Date: 2024    Visit Dx:     ICD-10-CM ICD-9-CM   1. Sepsis with acute renal failure, due to unspecified organism, unspecified acute renal failure type, unspecified whether septic shock present  A41.9 038.9    R65.20 995.92    N17.9 584.9   2. Encephalopathy  G93.40 348.30     Patient Active Problem List   Diagnosis    Compression fracture    Lumbar degenerative disc disease    Type 2 diabetes mellitus with hyperglycemia, with long-term current use of insulin    Hyperlipidemia    Benign essential hypertension    Primary hypothyroidism    Neuropathy    Osteoporosis    Proteinuria    Tobacco abuse    Diabetic eye exam    Generalized weakness    Spinal stenosis    Scoliosis    Arthritis    VBI (vertebrobasilar insufficiency)    Orthostatic hypotension    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    Severe hypothyroidism    Noncompliance with medication regimen    Vitamin D deficiency disease    Altered mental status    Tremor    Seizure    Stage 3a chronic kidney disease    Thoracic degenerative disc disease    Metabolic encephalopathy    VIPUL (acute kidney injury)    Hyperglycemia    Type II diabetes mellitus, uncontrolled    Lower abdominal pain    Transaminitis    UTI (urinary tract infection)    Emphysematous cystitis    Choledocholithiasis    Hypokalemia    Hypoxia    Generalized abdominal pain    History of Clostridium difficile infection    History of ERCP    Acute UTI (urinary tract infection)    Hypomagnesemia    Burning pain    Anxiety disorder    Neuropathic pain    Intertrigo    Weakness of both lower extremities    Accelerated hypertension    Acute cystitis without hematuria    Altered mental status, unspecified altered mental status type    Left lower lobe pneumonia    Acute pain of left foot    Cellulitis and abscess of left lower extremity    Hypertensive  kidney disease with stage 3a chronic kidney disease    Bilateral leg pain    Peripheral edema    Primary malignant neuroendocrine tumor of pancreas    Encounter for long-term (current) use of high-risk medication    Diabetic foot ulcer    Acute renal insufficiency    C. difficile diarrhea    Sepsis    Stage 3a chronic kidney disease    Metabolic encephalopathy    Pressure injury of buttock, stage 2     Past Medical History:   Diagnosis Date    Acute metabolic encephalopathy 6/24/2022    Anxiety     Arthritis     Benign essential hypertension 08/20/2014    Bleeding disorder     Depression     Diabetes     Diabetes mellitus, type 2     Disc degeneration, lumbar     Headache, tension-type     Hyperlipidemia     Hypothyroidism     Neuropathy     Osteoporosis 09/09/2015    Peripheral neuropathy     Rotator cuff tear, left     Scoliosis     Shoulder pain     LEFT, TORN ROTATOR CUFF S/P FALL    Spinal stenosis      Past Surgical History:   Procedure Laterality Date    APPENDECTOMY      BILATERAL BREAST REDUCTION Bilateral 08/2015    CATARACT EXTRACTION  03/2015    CHOLECYSTECTOMY WITH INTRAOPERATIVE CHOLANGIOGRAM N/A 3/27/2022    Procedure: CHOLECYSTECTOMY LAPAROSCOPIC INTRAOPERATIVE CHOLANGIOGRAM;  Surgeon: Aiyana Hill MD;  Location: [facility];  Service: General;  Laterality: N/A;    COLONOSCOPY  06/05/2015    WNL    ERCP N/A 2/25/2022    Procedure: ENDOSCOPIC RETROGRADE CHOLANGIOPANCREATOGRAPHY with sphincterotomy and balloon sweep;  Surgeon: Chilo Wilhelm MD;  Location: [facility] ENDOSCOPY;  Service: Gastroenterology;  Laterality: N/A;  PRE/POST - CBD stones    ERCP N/A 3/28/2022    Procedure: ENDOSCOPIC RETROGRADE CHOLANGIOPANCREATOGRAPHY WITH SPHINCTEROTOMY AND BALLOON SWEEP;  Surgeon: Chilo Wilhelm MD;  Location: [facility] ENDOSCOPY;  Service: Gastroenterology;  Laterality: N/A;  PRE: COMMON DUCT STONE  POST: COMMON DUCT STONE    KYPHOPLASTY      REDUCTION MAMMAPLASTY      TONSILLECTOMY         General Information       Row Name 02/07/24 1106          Physical Therapy Time and Intention    Document Type therapy note (daily note) (P)   -AK     Mode of Treatment physical therapy;co-treatment;occupational therapy (P)   -AK       Row Name 02/07/24 1106          General Information    Patient Profile Reviewed yes (P)   -AK     Existing Precautions/Restrictions fall (P)   -AK       Row Name 02/07/24 1106          Cognition    Orientation Status (Cognition) oriented x 3 (P)   -AK       Row Name 02/07/24 1106          Safety Issues, Functional Mobility    Impairments Affecting Function (Mobility) balance;endurance/activity tolerance;strength;range of motion (ROM) (P)   -AK               User Key  (r) = Recorded By, (t) = Taken By, (c) = Cosigned By      Initials Name Provider Type    Alyse Bruce, PT Student PT Student                   Mobility       Row Name 02/07/24 1107          Bed Mobility    Comment, (Bed Mobility) pt UIC at arrival and UIC at end of session. (P)   -AK       Row Name 02/07/24 1107          Sit-Stand Transfer    Sit-Stand [place] (Transfers) maximum assist (25% patient effort);moderate assist (50% patient effort);2 person assist (P)   max A for first STS, mod A for second.  -AK     Assistive Device (Sit-Stand Transfers) walker, front-wheeled (P)   -AK     Comment, (Sit-Stand Transfer) 2x. VC for upright posture. Pt stood up to 1 min. (P)   -AK       Row Name 02/07/24 1107          Gait/Stairs (Locomotion)    [place] Level (Gait) unable to assess (P)   -AK     Comment, (Gait/Stairs) Pt fatigued with standing, demonstrating inability to stand fully upright. Walking not safe to attempt this date. (P)   -AK               User Key  (r) = Recorded By, (t) = Taken By, (c) = Cosigned By      Initials Name Provider Type    Alyse Bruce PT Student PT Student                   Obj/Interventions       Row Name 02/07/24 1109          Motor Skills    Therapeutic Exercise -- (P)   Seated  therex, 10 reps: LAQ, ankle pumps  -AK       Row Name 02/07/24 1109          Balance    Balance Assessment sitting static balance;standing static balance;sitting dynamic balance (P)   -AK     Static Sitting Balance standby assist (P)   -AK     Dynamic Sitting Balance standby assist (P)   -AK     Position, Sitting Balance sitting in chair (P)   -AK     Static Standing Balance 2-person assist;maximum assist (P)   -AK     Position/Device Used, Standing Balance walker, front-wheeled (P)   -AK     Balance Interventions standing;static;supported (P)   -AK     Comment, Balance Max A x 2 for standing up to 1 min. Pt demonstrated posterior lean and maintained forward flexed thoracic spine throughout duration. (P)   -AK               User Key  (r) = Recorded By, (t) = Taken By, (c) = Cosigned By      Initials Name Provider Type    Alyse Bruce, PT Student PT Student                   Goals/Plan    No documentation.                  Clinical Impression       Row Name 02/07/24 1112          Pain    Pain Location - Side/Orientation Bilateral (P)   -AK     Pain Location lower (P)   -AK     Pain Location - extremity;foot (P)   -AK     Pre/Posttreatment Pain Comment Pt reports pain in BLE, with redness noted (R>L). She says everything hurts, but feet are worst. No numeric score given. (P)   -AK     Pain Intervention(s) Ambulation/increased activity;Rest;Repositioned (P)   -AK       Row Name 02/07/24 1112          Plan of Care Review    Plan of Care Reviewed With patient (P)   -AK     Progress no change (P)   -AK     Outcome Evaluation Pt seen this AM for OT/PT co-treat. She was [facility] at arrival and agreeable. She shows no major change in mobility status since seen 1/5. She continues to need mod-max A x 2 to stand with Fww and max A x 2 for balance with VC for upright posture and hand placement. She demonstrates decreased activity tolerance and strength which requires continued skilled PT. PT will progress as suzanne. (P)   -AK        Row Name 02/07/24 1112          Positioning and Restraints    Pre-Treatment Position sitting in chair/recliner (P)   -AK     Post Treatment Position chair (P)   -AK     In Chair reclined;call light within reach;encouraged to call for assist;exit alarm on (P)   -AK               User Key  (r) = Recorded By, (t) = Taken By, (c) = Cosigned By      Initials Name Provider Type    Alyse Bruce, PT Student PT Student                   Outcome Measures       Row Name 02/07/24 1116 02/07/24 0404       How much help from another person do you currently need...    Turning from your back to your side while in flat bed without using bedrails? 2 (P)   -AK 2  -ZL    Moving from lying on back to sitting on the side of a flat bed without bedrails? 2 (P)   -AK 2  -ZL    Moving to and from a bed to a chair (including a wheelchair)? 2 (P)   -AK 2  -ZL    Standing up from a chair using your arms (e.g., wheelchair, bedside chair)? 2 (P)   -AK 2  -ZL    Climbing 3-5 steps with a railing? 1 (P)   -AK 1  -ZL    To walk in hospital room? 2 (P)   -AK 2  -ZL    AM-PAC 6 Clicks Score (PT) 11 (P)   -AK 11  -ZL    Highest Level of Mobility Goal 4 --> Transfer to chair/commode (P)   -AK 4 --> Transfer to chair/commode  -ZL      Row Name 02/07/24 1116          Functional Assessment    Outcome Measure Options AM-PAC 6 Clicks Basic Mobility (PT) (P)   -AK               User Key  (r) = Recorded By, (t) = Taken By, (c) = Cosigned By      Initials Name Provider Type    Arun Telles RN Registered Nurse    Alyse Bruce, PT Student PT Student                                 Physical Therapy Education       Title: PT OT SLP Therapies (Done)       Topic: Physical Therapy (Done)       Point: Mobility training (Done)       Learning Progress Summary             Patient Acceptance, E,TB,D, VU,DU by AK at 2/7/2024 1117    Comment: Continues to need VC for hand placement with STS transfer and walker mgmt for safety.    Acceptance, E,TB,D, VU,DU by AK at  2/5/2024 1408    Acceptance, E,TB, VU by LM at 2/4/2024 1830    Acceptance, TB,E, VU by LM at 2/3/2024 1714    Acceptance, E,TB,D, VU,NR by PH at 2/2/2024 1035    Acceptance, E,TB, VU,NR,DU by PH at 2/1/2024 1423    Acceptance, E,TB, VU,DU,NR by CS at 1/30/2024 1002    Acceptance, E,TB, VU,NR by CS1 at 1/28/2024 0848                         Point: Home exercise program (Done)       Learning Progress Summary             Patient Acceptance, E,TB,D, VU,DU by AK at 2/7/2024 1117    Comment: Continues to need VC for hand placement with STS transfer and walker mgmt for safety.    Acceptance, E,TB, VU by LM at 2/4/2024 1830    Acceptance, TB,E, VU by LM at 2/3/2024 1714    Acceptance, E,TB,D, VU,NR by PH at 2/2/2024 1035    Acceptance, E,TB, VU,NR,DU by PH at 2/1/2024 1423    Acceptance, E,TB, VU,DU,NR by CS at 1/30/2024 1002    Acceptance, E,TB, VU,NR by CS1 at 1/28/2024 0848                         Point: Body mechanics (Done)       Learning Progress Summary             Patient Acceptance, E,TB,D, VU,DU by AK at 2/7/2024 1117    Comment: Continues to need VC for hand placement with STS transfer and walker mgmt for safety.    Acceptance, E,TB,D, VU,DU by AK at 2/5/2024 1408    Acceptance, E,TB, VU by LM at 2/4/2024 1830    Acceptance, TB,E, VU by LM at 2/3/2024 1714    Acceptance, E,TB,D, VU,NR by PH at 2/2/2024 1035    Acceptance, E,TB, VU,NR,DU by PH at 2/1/2024 1423    Acceptance, E,TB, VU,DU,NR by CS at 1/30/2024 1002    Acceptance, E,TB, VU,NR by CS1 at 1/28/2024 0848                         Point: Precautions (Done)       Learning Progress Summary             Patient Acceptance, E,TB, VU by LM at 2/4/2024 1830    Acceptance, TB,E, VU by LM at 2/3/2024 1714    Acceptance, E,TB,D, VU,NR by PH at 2/2/2024 1035    Acceptance, E,TB, VU,NR,DU by PH at 2/1/2024 1423    Acceptance, E,TB, VU,DU,NR by CS at 1/30/2024 1002    Acceptance, E,TB, VU,NR by CS1 at 1/28/2024 0848                                         User Key        Initials Effective Dates Name Provider Type Discipline     06/16/21 -  Margarita Lang, RN Registered Nurse Nurse    CS1 07/11/23 -  Bakari Lerma, PT Physical Therapist PT     06/16/21 -  Joana Pimentel, PTA Physical Therapist Assistant PT     09/22/22 -  Lizzy Waterman, MELODIE Physical Therapist PT    AK 12/28/23 -  lAyse Mckeon, PT Student PT Student PT                  PT Recommendation and Plan     Plan of Care Reviewed With: (P) patient  Progress: (P) no change  Outcome Evaluation: (P) Pt seen this AM for OT/PT co-treat. She was [facility] at arrival and agreeable. She shows no major change in mobility status since seen 1/5. She continues to need mod-max A x 2 to stand with Fww and max A x 2 for balance with VC for upright posture and hand placement. She demonstrates decreased activity tolerance and strength which requires continued skilled PT. PT will progress as suzanne.     Time Calculation:         PT Charges       Row Name 02/07/24 1117             Time Calculation    Start Time 1041 (P)   -AK      Stop Time 1100 (P)   -AK      Time Calculation (min) 19 min (P)   -AK      PT Received On 02/07/24 (P)   -AK      PT - Next Appointment 02/09/24 (P)   -AK         Time Calculation- PT    Total Timed Code Minutes- PT 19 minute(s) (P)   -AK                User Key  (r) = Recorded By, (t) = Taken By, (c) = Cosigned By      Initials Name Provider Type    AK Alyse Mckeon, PT Student PT Student                  Therapy Charges for Today       Code Description Service Date Service Provider Modifiers Qty    [id number]  PT THERAPEUTIC ACT EA 15 MIN 2/7/2024 Alyse Mckeon, PT Student GP 1            PT G-Codes  Outcome Measure Options: (P) AM-PAC 6 Clicks Basic Mobility (PT)  AM-PAC 6 Clicks Score (PT): (P) 11  AM-PAC 6 Clicks Score (OT): 12  PT Discharge Summary  Anticipated Discharge Disposition (PT): (P) skilled nursing facility    Alyse Mckeon PT Student  2/7/2024

## 2024-02-07 NOTE — PLAN OF CARE
Goal Outcome Evaluation:  Plan of Care Reviewed With: patient        Progress: no change  Outcome Evaluation: Pt UIC laying head on tray table on OT/PT arrival and agreeable to treatment. Pt MOD A/MAX x2 STS from chair with FWWx and cues for upright posture and posterior lean noted and MAX Ax2 for standing balance with c/o BLE pain. Pt fatigues quickly and unable attempt functional mobility. Pt tolerated BUE ther ex with red T band sitting UIC and educated on increasing reps and to perform 3x/day to promote increased strength and endurance. Pt reclined with legs up on departure.      Anticipated Discharge Disposition (OT): skilled nursing facility, extended care facility

## 2024-02-07 NOTE — PLAN OF CARE
Goal Outcome Evaluation:  Plan of Care Reviewed With: (P) patient        Progress: (P) no change  Outcome Evaluation: (P) Pt seen this AM for OT/PT co-treat. She was [facility]IC at arrival and agreeable. She shows no major change in mobility status since seen 1/5. She continues to need mod-max A x 2 to stand with Fww and max A x 2 for balance with VC for upright posture and hand placement. She demonstrates decreased activity tolerance and strength which requires continued skilled PT. PT will progress as suzanne.      Anticipated Discharge Disposition (PT): (P) skilled nursing facility

## 2024-02-07 NOTE — PLAN OF CARE
Goal Outcome Evaluation:  Plan of Care Reviewed With: patient        Progress: improving  Outcome Evaluation: Skin care done, cream applied after incontinent care. Betadine applied to toes; Venelex applied to heels. Refused heel boots but applied two pillows under calf. VSS. All needs met.

## 2024-02-07 NOTE — PROGRESS NOTES
Name: Halie Guidry ADMIT: 2024   : 1940  PCP: Luis Felipe Flowers MD    MRN: [id number] LOS: 11 days   AGE/SEX: 83 y.o. female  ROOM: [facility]     Subjective   Subjective   Patient seen at bedside.       Objective   Objective   Vital Signs  Temp:  [98.4 °F (36.9 °C)-98.6 °F (37 °C)] 98.4 °F (36.9 °C)  Heart Rate:  [68-69] 69  Resp:  [16-18] 18  BP: (124-159)/(63-79) 143/70  SpO2:  [97 %-99 %] 99 %  on   ;   Device (Oxygen Therapy): room air  Body mass index is 34.29 kg/m².  Physical Exam  Vitals and nursing note reviewed.   Constitutional:       General: She is not in acute distress.     Appearance: She is ill-appearing.   Cardiovascular:      Rate and Rhythm: Normal rate and regular rhythm.      Pulses: Normal pulses.      Heart sounds: Normal heart sounds.   Pulmonary:      Effort: Pulmonary effort is normal. No respiratory distress.      Breath sounds: Normal breath sounds.   Abdominal:      General: Bowel sounds are normal. There is no distension.      Palpations: Abdomen is soft.      Tenderness: There is abdominal tenderness (mild to palpation, improved from prior).   Skin:     General: Skin is warm and dry.      Coloration: Skin is pale.   Neurological:      Mental Status: She is alert.        Copied text material from yesterday's note has been reviewed for appropriate changes and remains accurate as of 24.      Results Review     I reviewed the patient's new clinical results.  Results from last 7 days   Lab Units 24  0608 24  0437 24  0630 24  0522   WBC 10*3/mm3 12.28* 13.13* 12.72* 16.92*   HEMOGLOBIN g/dL 9.6* 10.9* 9.7* 10.2*   PLATELETS 10*3/mm3 295 283 255 252     Results from last 7 days   Lab Units 24  0608 24  0437 24  0630 24  1538 24  0522   SODIUM mmol/L 141 140 142  --  142   POTASSIUM mmol/L 4.0 4.0 4.2 4.1 3.3*   CHLORIDE mmol/L 105 104 108*  --  108*   CO2 mmol/L 21.3* 22.8 21.9*  --  23.0   BUN mg/dL 22 21 16  --   16   CREATININE mg/dL 1.18* 1.19* 1.17*  --  1.02*   GLUCOSE mg/dL 245* 221* 214*  --  145*   EGFR mL/min/1.73 45.9* 45.5* 46.4*  --  54.7*     Results from last 7 days   Lab Units 02/03/24  0405 02/02/24  0439 02/01/24  0421   ALBUMIN g/dL 2.1* 2.2* 2.0*     Results from last 7 days   Lab Units 02/07/24  0608 02/06/24  0437 02/05/24  0630 02/04/24  0522 02/03/24  0405 02/02/24  0439 02/01/24  0421   CALCIUM mg/dL 7.7* 8.1* 7.7* 7.6* 7.6* 7.6* 7.7*   ALBUMIN g/dL  --   --   --   --  2.1* 2.2* 2.0*   MAGNESIUM mg/dL  --   --   --   --  1.8 2.0 1.6   PHOSPHORUS mg/dL  --   --   --   --  3.9 2.4* 2.6       Glucose   Date/Time Value Ref Range Status   02/07/2024 1134 210 (H) 70 - 130 mg/dL Final   02/07/2024 0759 253 (H) 70 - 130 mg/dL Final   02/06/2024 2113 209 (H) 70 - 130 mg/dL Final   02/06/2024 1728 188 (H) 70 - 130 mg/dL Final   02/06/2024 1157 198 (H) 70 - 130 mg/dL Final   02/06/2024 0814 237 (H) 70 - 130 mg/dL Final   02/05/2024 2032 315 (H) 70 - 130 mg/dL Final       No radiology results for the last day    I have personally reviewed all medications:  Scheduled Medications  amLODIPine, 10 mg, Oral, Q24H  atorvastatin, 80 mg, Oral, Nightly  bisoprolol, 5 mg, Oral, Daily  castor oil-balsam peru, 1 Application, Topical, Q12H  famotidine, 20 mg, Oral, Daily  fidaxomicin, 200 mg, Oral, Q12H  furosemide, 20 mg, Oral, Daily  insulin glargine, 25 Units, Subcutaneous, Nightly  insulin lispro, 13 Units, Subcutaneous, TID With Meals  insulin lispro, 2-7 Units, Subcutaneous, 4x Daily AC & at Bedtime  levothyroxine, 112 mcg, Oral, Once per day on Monday Tuesday Wednesday Thursday Friday Saturday  levothyroxine, 168 mcg, Oral, Weekly  Menthol-Zinc Oxide, 1 Application, Topical, 4x Daily  multivitamin with minerals, 1 tablet, Oral, Daily  potassium chloride, 20 mEq, Oral, Daily  pregabalin, 50 mg, Oral, Daily  cyanocobalamin, 1,000 mcg, Oral, Daily    Infusions  Pharmacy Consult,     Diet  Diet: Cardiac Diets, Diabetic  Diets; Healthy Heart (2-3 Na+); Consistent Carbohydrate; Texture: Regular Texture (IDDSI 7); Fluid Consistency: Thin (IDDSI 0)    I have personally reviewed:  [x]  Laboratory   [x]  Microbiology   [x]  Radiology   [x]  EKG/Telemetry  [x]  Cardiology/Vascular   []  Pathology    []  Records       Assessment/Plan     Active Hospital Problems    Diagnosis  POA   • **Sepsis [A41.9]  Yes   • Stage 3a chronic kidney disease [N18.31]  Yes   • Metabolic encephalopathy [G93.41]  Yes   • Pressure injury of buttock, stage 2 [L89.302]  Yes   • C. difficile diarrhea [A04.72]  Yes   • Neuropathic pain [M79.2]  Yes   • Acute UTI (urinary tract infection) [N39.0]  Yes   • VIPUL (acute kidney injury) [N17.9]  Yes   • Seizure [R56.9]  Yes   • Hyperlipidemia [E78.5]  Yes   • Type 2 diabetes mellitus with hyperglycemia, with long-term current use of insulin [E11.65, Z79.4]  Not Applicable   • Benign essential hypertension [I10]  Yes      Resolved Hospital Problems   No resolved problems to display.       83 y.o. female admitted with Sepsis.    Sepsis  Recurrent C. Difficile colitis  - ID consulted and has followed. Patient has been on Vancomycin, but Dificid has now been approved on 01/31, so Vancomycin was stopped and Dificid was started (01/31/24), she remains on this at present. WBC remains elevated, but improved overall.  Symptoms continue to improve.  - ID plans for 10-day course, and after completion, ID recommending daily probiotic x 3 months. Discussed with CCP, she will not be able to take this medication at rehab, so will have to stay here to complete antibiotic course. Should be ready to be discharged by this weekend. Appreciate ID help, continue medication as prescribed.  - Order repeat CBC in AM for reassessment of WBC count.     Acute kidney injury  - Etiology most likely pre-renal in nature, due to volume depletion, decreased PO intake, impaired autoregulation from Lisinopril. Nephrology has followed, creatinine peaked at  4.99, has received IVF, which has led to improvement. They signed off (02/04), greatly appreciate their help.  - Diuretics added back (02/04) see discussion elsewhere but today (02/06)  creatinine slightly worse again today, going to change furosemide back to daily rather than BID and see if this helps. If no improvement noted, may need to ask Nephrology to see again to assist further.  - Order repeat BMP in AM for reassessment. Closely monitor renal function.     Type 2 diabetes with hyperglycemia  - Most recent A1c: 8.20% (12/24/23).  - Current treatment regimen: Glargine 21 units nightly, lispro 11 units TID with meals, correctional SSI; Glucose uncontrolled around mealtimes, warrants adjustment.  - Increase Glargine to 25 units nightly, increase lispro to 13 units TID with meals, continue correctional SSI.  - Will monitor 24 hour insulin requirements and adjust as needed to achieve target inpatient range of 140-180.     Hypertension  - Blood pressure is elevated more on 02/04, with SBP >180, however, patient asymptomatic, consistent with urgency, but it is transient elevation.  - (02/04): Discussed with Nephrology, resumed home furosemide but did 20 mg BID rather than 20 mg daily she was on previously, and resumed home Amlodipine but at lower dose of 5 mg daily rather than 10 she is on at home for now. Added supplemental potassium in setting of diuretic.  - (02/06): Blood pressure remains elevated, need to adjust medications further. Increase Amlodipine to 10 mg daily. Decreasing Furosemide as discussed elsewhere due to renal function.  - Trend BP to guide ongoing management decisions. Continue to hold home Lisinopril. May need to add another agent aside from diuretic if BP continues to be elevated.     Anemia  - Hemoglobin low on most recent labs, however, relatively stable from prior. No evidence of overt blood loss. No indications for acute intervention at this time.    - Order repeat CBC in AM for  reassessment. Continue to monitor, transfuse for hemoglobin <7.     Anxiety  - Endorsed by patient on 02/02, given PRN Hydroxyzine, which has helped symptoms. Continue as prescribed.      Hypothyroidism  - Continue levothyroxine as prescribed.     Hyperlipidemia  - Continue Statin as prescribed.     Hypokalemia, resolved  - K low on prior labs, repleted via electrolyte protocol. Follow up repeat labs to guide ongoing management decisions. Replete PRN per protocol. Continue to monitor     Hypophosphatemia, resolved  - Noted on prior labs, improved on most recent testing.     SCDs for DVT prophylaxis.  Limited code (no CPR, no intubation).  Discussed with patient, nursing staff, CCP, and care team on multidisciplinary rounds.  Anticipate discharge to SNU facility later this week.         Eddie Vela MD  [facility]  02/07/24  16:55 EST

## 2024-02-08 LAB
ANION GAP SERPL CALCULATED.3IONS-SCNC: 14 MMOL/L (ref 5–15)
BASOPHILS # BLD AUTO: 0.15 10*3/MM3 (ref 0–0.2)
BASOPHILS NFR BLD AUTO: 1.2 % (ref 0–1.5)
BUN SERPL-MCNC: 24 MG/DL (ref 8–23)
BUN/CREAT SERPL: 16.1 (ref 7–25)
CALCIUM SPEC-SCNC: 7.8 MG/DL (ref 8.6–10.5)
CHLORIDE SERPL-SCNC: 104 MMOL/L (ref 98–107)
CO2 SERPL-SCNC: 21 MMOL/L (ref 22–29)
CREAT SERPL-MCNC: 1.49 MG/DL (ref 0.57–1)
DEPRECATED RDW RBC AUTO: 51.8 FL (ref 37–54)
EGFRCR SERPLBLD CKD-EPI 2021: 34.7 ML/MIN/1.73
EOSINOPHIL # BLD AUTO: 0.42 10*3/MM3 (ref 0–0.4)
EOSINOPHIL NFR BLD AUTO: 3.4 % (ref 0.3–6.2)
ERYTHROCYTE [DISTWIDTH] IN BLOOD BY AUTOMATED COUNT: 16.3 % (ref 12.3–15.4)
GLUCOSE BLDC GLUCOMTR-MCNC: 176 MG/DL (ref 70–130)
GLUCOSE BLDC GLUCOMTR-MCNC: 179 MG/DL (ref 70–130)
GLUCOSE BLDC GLUCOMTR-MCNC: 182 MG/DL (ref 70–130)
GLUCOSE BLDC GLUCOMTR-MCNC: 222 MG/DL (ref 70–130)
GLUCOSE SERPL-MCNC: 195 MG/DL (ref 65–99)
HCT VFR BLD AUTO: 30.1 % (ref 34–46.6)
HGB BLD-MCNC: 9.6 G/DL (ref 12–15.9)
IMM GRANULOCYTES # BLD AUTO: 0.09 10*3/MM3 (ref 0–0.05)
IMM GRANULOCYTES NFR BLD AUTO: 0.7 % (ref 0–0.5)
LYMPHOCYTES # BLD AUTO: 1.92 10*3/MM3 (ref 0.7–3.1)
LYMPHOCYTES NFR BLD AUTO: 15.6 % (ref 19.6–45.3)
MCH RBC QN AUTO: 27.5 PG (ref 26.6–33)
MCHC RBC AUTO-ENTMCNC: 31.9 G/DL (ref 31.5–35.7)
MCV RBC AUTO: 86.2 FL (ref 79–97)
MONOCYTES # BLD AUTO: 0.97 10*3/MM3 (ref 0.1–0.9)
MONOCYTES NFR BLD AUTO: 7.9 % (ref 5–12)
NEUTROPHILS NFR BLD AUTO: 71.2 % (ref 42.7–76)
NEUTROPHILS NFR BLD AUTO: 8.75 10*3/MM3 (ref 1.7–7)
NRBC BLD AUTO-RTO: 0 /100 WBC (ref 0–0.2)
PLATELET # BLD AUTO: 309 10*3/MM3 (ref 140–450)
PMV BLD AUTO: 9.8 FL (ref 6–12)
POTASSIUM SERPL-SCNC: 4.1 MMOL/L (ref 3.5–5.2)
RBC # BLD AUTO: 3.49 10*6/MM3 (ref 3.77–5.28)
SODIUM SERPL-SCNC: 139 MMOL/L (ref 136–145)
WBC NRBC COR # BLD AUTO: 12.3 10*3/MM3 (ref 3.4–10.8)

## 2024-02-08 PROCEDURE — 63710000001 INSULIN GLARGINE PER 5 UNITS: Performed by: STUDENT IN AN ORGANIZED HEALTH CARE EDUCATION/TRAINING PROGRAM

## 2024-02-08 PROCEDURE — 63710000001 INSULIN LISPRO (HUMAN) PER 5 UNITS: Performed by: STUDENT IN AN ORGANIZED HEALTH CARE EDUCATION/TRAINING PROGRAM

## 2024-02-08 PROCEDURE — 85025 COMPLETE CBC W/AUTO DIFF WBC: CPT | Performed by: STUDENT IN AN ORGANIZED HEALTH CARE EDUCATION/TRAINING PROGRAM

## 2024-02-08 PROCEDURE — 63710000001 INSULIN LISPRO (HUMAN) PER 5 UNITS: Performed by: INTERNAL MEDICINE

## 2024-02-08 PROCEDURE — 97110 THERAPEUTIC EXERCISES: CPT

## 2024-02-08 PROCEDURE — 80048 BASIC METABOLIC PNL TOTAL CA: CPT | Performed by: STUDENT IN AN ORGANIZED HEALTH CARE EDUCATION/TRAINING PROGRAM

## 2024-02-08 PROCEDURE — 82948 REAGENT STRIP/BLOOD GLUCOSE: CPT

## 2024-02-08 RX ADMIN — FAMOTIDINE 20 MG: 20 TABLET, FILM COATED ORAL at 08:37

## 2024-02-08 RX ADMIN — ANORECTAL OINTMENT 1 APPLICATION: 15.7; .44; 24; 20.6 OINTMENT TOPICAL at 18:05

## 2024-02-08 RX ADMIN — PREGABALIN 50 MG: 50 CAPSULE ORAL at 08:35

## 2024-02-08 RX ADMIN — CASTOR OIL AND BALSAM, PERU 1 APPLICATION: 788; 87 OINTMENT TOPICAL at 08:36

## 2024-02-08 RX ADMIN — INSULIN GLARGINE 25 UNITS: 100 INJECTION, SOLUTION SUBCUTANEOUS at 20:51

## 2024-02-08 RX ADMIN — ANORECTAL OINTMENT 1 APPLICATION: 15.7; .44; 24; 20.6 OINTMENT TOPICAL at 12:36

## 2024-02-08 RX ADMIN — FIDAXOMICIN 200 MG: 200 TABLET, FILM COATED ORAL at 08:35

## 2024-02-08 RX ADMIN — INSULIN LISPRO 2 UNITS: 100 INJECTION, SOLUTION INTRAVENOUS; SUBCUTANEOUS at 18:04

## 2024-02-08 RX ADMIN — FIDAXOMICIN 200 MG: 200 TABLET, FILM COATED ORAL at 20:51

## 2024-02-08 RX ADMIN — ANORECTAL OINTMENT 1 APPLICATION: 15.7; .44; 24; 20.6 OINTMENT TOPICAL at 08:38

## 2024-02-08 RX ADMIN — INSULIN LISPRO 3 UNITS: 100 INJECTION, SOLUTION INTRAVENOUS; SUBCUTANEOUS at 08:38

## 2024-02-08 RX ADMIN — HYDROXYZINE HYDROCHLORIDE 10 MG: 10 TABLET ORAL at 22:29

## 2024-02-08 RX ADMIN — INSULIN LISPRO 13 UNITS: 100 INJECTION, SOLUTION INTRAVENOUS; SUBCUTANEOUS at 08:38

## 2024-02-08 RX ADMIN — INSULIN LISPRO 13 UNITS: 100 INJECTION, SOLUTION INTRAVENOUS; SUBCUTANEOUS at 18:04

## 2024-02-08 RX ADMIN — HYDROXYZINE HYDROCHLORIDE 10 MG: 10 TABLET ORAL at 08:39

## 2024-02-08 RX ADMIN — INSULIN LISPRO 13 UNITS: 100 INJECTION, SOLUTION INTRAVENOUS; SUBCUTANEOUS at 12:34

## 2024-02-08 RX ADMIN — INSULIN LISPRO 2 UNITS: 100 INJECTION, SOLUTION INTRAVENOUS; SUBCUTANEOUS at 12:34

## 2024-02-08 RX ADMIN — HYDROCODONE BITARTRATE AND ACETAMINOPHEN 1 TABLET: 10; 325 TABLET ORAL at 20:51

## 2024-02-08 RX ADMIN — Medication 1 TABLET: at 08:36

## 2024-02-08 RX ADMIN — ATORVASTATIN CALCIUM 80 MG: 80 TABLET, FILM COATED ORAL at 20:51

## 2024-02-08 RX ADMIN — ANORECTAL OINTMENT 1 APPLICATION: 15.7; .44; 24; 20.6 OINTMENT TOPICAL at 20:52

## 2024-02-08 RX ADMIN — AMLODIPINE BESYLATE 10 MG: 10 TABLET ORAL at 08:37

## 2024-02-08 RX ADMIN — INSULIN LISPRO 2 UNITS: 100 INJECTION, SOLUTION INTRAVENOUS; SUBCUTANEOUS at 20:51

## 2024-02-08 RX ADMIN — FUROSEMIDE 20 MG: 20 TABLET ORAL at 08:36

## 2024-02-08 RX ADMIN — BISOPROLOL FUMARATE 5 MG: 5 TABLET, FILM COATED ORAL at 08:36

## 2024-02-08 RX ADMIN — CASTOR OIL AND BALSAM, PERU 1 APPLICATION: 788; 87 OINTMENT TOPICAL at 20:51

## 2024-02-08 RX ADMIN — Medication 1000 MCG: at 08:36

## 2024-02-08 RX ADMIN — LEVOTHYROXINE SODIUM 112 MCG: 112 TABLET ORAL at 08:37

## 2024-02-08 RX ADMIN — POTASSIUM CHLORIDE 20 MEQ: 750 TABLET, EXTENDED RELEASE ORAL at 08:35

## 2024-02-08 NOTE — PROGRESS NOTES
"Assessment Date:  02/08/24    Summary: Pt reported intake of 75% of 3 meals. Stated she is hungry but doesn't always like the food. Pt says her appetite is fine. Pt reported she did not like the Chris and doesn't want Chris sent anymore. RD offered mighty shakes and pt was agreeable. Pt denied N/V and chewing/swallowing issues. RD to initiate might shakes QD and keep following.     NUTRITION SCREENING      Reason for Encounter Nonhealing wound or pressure ulcer   Diagnosis/Problem Diarrhea and AMS and was found to have severe sepsis. Pt has a hx of CKD IIIa, HTN, T2DM, Diabetic foot wound infected with multidrug resistant Enterobacter and recurrent C. Diff.       PO Diet Diet: Cardiac Diets, Diabetic Diets; Healthy Heart (2-3 Na+); Consistent Carbohydrate; Texture: Regular Texture (IDDSI 7); Fluid Consistency: Thin (IDDSI 0)   Supplements Chris BID   PO Intake % Per EMR, Pt eating 100% of meals.        Medications MAR reviewed by RD   Labs  Listed below, reviewed   Physical Findings Awake, alert, oriented, obese   GI Function Fecal incontinence, Last BM 2/7   Skin Status Balteral medial sacral spine PI, Left anterior third toe diabetic ulcer, Left posterior heel PI, Right anterior third toe, Left heel PI, Left posterior plantar diabetic ulcer.        Height  Weight  BMI  Weight Trend     Height: 172.7 cm (68\")  Weight: 102 kg (225 lb 8.5 oz) (02/07/24 0600)  Body mass index is 34.29 kg/m².  Stable, Other: Pt reported her weight fluctuates normally due to fluid accumulation in her legs.        Nutrition Problem (PES) Increased nutrients needs related to wound healing as evidenced by multiple wounds present.        Intervention/Plan Might shakes QD    RD to follow up per protocol.     Results from last 7 days   Lab Units 02/08/24  0456 02/07/24  0608 02/06/24  0437   SODIUM mmol/L 139 141 140   POTASSIUM mmol/L 4.1 4.0 4.0   CHLORIDE mmol/L 104 105 104   CO2 mmol/L 21.0* 21.3* 22.8   BUN mg/dL 24* 22 21   CREATININE " mg/dL 1.49* 1.18* 1.19*   CALCIUM mg/dL 7.8* 7.7* 8.1*   GLUCOSE mg/dL 195* 245* 221*     Results from last 7 days   Lab Units 02/08/24  0456 02/04/24  0522 02/03/24  0405 02/02/24  0439   MAGNESIUM mg/dL  --   --  1.8 2.0   PHOSPHORUS mg/dL  --   --  3.9 2.4*   HEMOGLOBIN g/dL 9.6*   < > 10.1* 9.8*   HEMATOCRIT % 30.1*   < > 31.8* 30.7*    < > = values in this interval not displayed.     Lab Results   Component Value Date    HGBA1C 8.20 (H) 12/24/2023         Electronically signed by:  Carson Loyola  02/08/24 12:23 EST

## 2024-02-08 NOTE — SIGNIFICANT NOTE
02/08/24 1709   Post Acute Pre-Cert Documentation   Request Submitted by Facility - Type: Hospital   Post-Acute Authorization Type Submitted: SNF   Date Post Acute Pre-Cert Inititated per Facility 02/08/24   Accepting Facility [facility] Discharge Date Requested 02/09/24   All Clinicals Submitted? Yes   Had Accepting Facility at Time of Submission Yes   Response Communicated to: ;Accepting Facility Liaison   Authorization Number: [id number]   Post Acute Pre-Cert Initiated Comment LUCY JUSTIN

## 2024-02-08 NOTE — THERAPY TREATMENT NOTE
Patient Name: Halie Guidry  : 1940    MRN: [id number]                              Today's Date: 2024       Admit Date: 2024    Visit Dx:     ICD-10-CM ICD-9-CM   1. Sepsis with acute renal failure, due to unspecified organism, unspecified acute renal failure type, unspecified whether septic shock present  A41.9 038.9    R65.20 995.92    N17.9 584.9   2. Encephalopathy  G93.40 348.30     Patient Active Problem List   Diagnosis    Compression fracture    Lumbar degenerative disc disease    Type 2 diabetes mellitus with hyperglycemia, with long-term current use of insulin    Hyperlipidemia    Benign essential hypertension    Primary hypothyroidism    Neuropathy    Osteoporosis    Proteinuria    Tobacco abuse    Diabetic eye exam    Generalized weakness    Spinal stenosis    Scoliosis    Arthritis    VBI (vertebrobasilar insufficiency)    Orthostatic hypotension    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    Severe hypothyroidism    Noncompliance with medication regimen    Vitamin D deficiency disease    Altered mental status    Tremor    Seizure    Stage 3a chronic kidney disease    Thoracic degenerative disc disease    Metabolic encephalopathy    VIPUL (acute kidney injury)    Hyperglycemia    Type II diabetes mellitus, uncontrolled    Lower abdominal pain    Transaminitis    UTI (urinary tract infection)    Emphysematous cystitis    Choledocholithiasis    Hypokalemia    Hypoxia    Generalized abdominal pain    History of Clostridium difficile infection    History of ERCP    Acute UTI (urinary tract infection)    Hypomagnesemia    Burning pain    Anxiety disorder    Neuropathic pain    Intertrigo    Weakness of both lower extremities    Accelerated hypertension    Acute cystitis without hematuria    Altered mental status, unspecified altered mental status type    Left lower lobe pneumonia    Acute pain of left foot    Cellulitis and abscess of left lower extremity    Hypertensive  kidney disease with stage 3a chronic kidney disease    Bilateral leg pain    Peripheral edema    Primary malignant neuroendocrine tumor of pancreas    Encounter for long-term (current) use of high-risk medication    Diabetic foot ulcer    Acute renal insufficiency    C. difficile diarrhea    Sepsis    Stage 3a chronic kidney disease    Metabolic encephalopathy    Pressure injury of buttock, stage 2     Past Medical History:   Diagnosis Date    Acute metabolic encephalopathy 6/24/2022    Anxiety     Arthritis     Benign essential hypertension 08/20/2014    Bleeding disorder     Depression     Diabetes     Diabetes mellitus, type 2     Disc degeneration, lumbar     Headache, tension-type     Hyperlipidemia     Hypothyroidism     Neuropathy     Osteoporosis 09/09/2015    Peripheral neuropathy     Rotator cuff tear, left     Scoliosis     Shoulder pain     LEFT, TORN ROTATOR CUFF S/P FALL    Spinal stenosis      Past Surgical History:   Procedure Laterality Date    APPENDECTOMY      BILATERAL BREAST REDUCTION Bilateral 08/2015    CATARACT EXTRACTION  03/2015    CHOLECYSTECTOMY WITH INTRAOPERATIVE CHOLANGIOGRAM N/A 3/27/2022    Procedure: CHOLECYSTECTOMY LAPAROSCOPIC INTRAOPERATIVE CHOLANGIOGRAM;  Surgeon: Aiyana Hill MD;  Location: [facility];  Service: General;  Laterality: N/A;    COLONOSCOPY  06/05/2015    WNL    ERCP N/A 2/25/2022    Procedure: ENDOSCOPIC RETROGRADE CHOLANGIOPANCREATOGRAPHY with sphincterotomy and balloon sweep;  Surgeon: Chilo Wilhelm MD;  Location: [facility] ENDOSCOPY;  Service: Gastroenterology;  Laterality: N/A;  PRE/POST - CBD stones    ERCP N/A 3/28/2022    Procedure: ENDOSCOPIC RETROGRADE CHOLANGIOPANCREATOGRAPHY WITH SPHINCTEROTOMY AND BALLOON SWEEP;  Surgeon: Chilo Wilhelm MD;  Location: [facility] ENDOSCOPY;  Service: Gastroenterology;  Laterality: N/A;  PRE: COMMON DUCT STONE  POST: COMMON DUCT STONE    KYPHOPLASTY      REDUCTION MAMMAPLASTY      TONSILLECTOMY         General Information       Row Name 02/08/24 1739          OT Time and Intention    Document Type therapy note (daily note)  -SELAM     Mode of Treatment occupational therapy;individual therapy  -SELAM       Row Name 02/08/24 1739          General Information    Patient Profile Reviewed yes  -SELAM     Existing Precautions/Restrictions fall  -SELAM       Row Name 02/08/24 1739          Cognition    Orientation Status (Cognition) oriented x 3  -SELAM       Row Name 02/08/24 1739          Safety Issues, Functional Mobility    Impairments Affecting Function (Mobility) balance;endurance/activity tolerance;strength;range of motion (ROM);pain  -SELAM     Comment, Safety Issues/Impairments (Mobility) non skid socks worn  -SELAM               User Key  (r) = Recorded By, (t) = Taken By, (c) = Cosigned By      Initials Name Provider Type    Radha Soni OT Occupational Therapist                     Mobility/ADL's       Row Name 02/08/24 1740          Bed Mobility    Comment, (Bed Mobility) UIC on arrival  -SELAM       Row Name 02/08/24 1740          Transfers    Comment, (Transfers) declined txfers dt BLE pain and inc edema  -SELAM       Row Name 02/08/24 1740          Functional Mobility    Functional Mobility- Ind. Level not tested  -SELAM     Functional Mobility- Comment declined d/t c/o BLE pain and inc edema noted  -SELAM               User Key  (r) = Recorded By, (t) = Taken By, (c) = Cosigned By      Initials Name Provider Type    Radha Soni OT Occupational Therapist                   Obj/Interventions       Row Name 02/08/24 1741          Shoulder (Therapeutic Exercise)    Shoulder (Therapeutic Exercise) strengthening exercise  -SELAM     Shoulder Strengthening (Therapeutic Exercise) yellow;resistance band;sitting;aBduction;aDduction;horizontal aBduction/aDduction;flexion;bilateral;10 repetitions  -SELAM       Row Name 02/08/24 1741          Elbow/Forearm (Therapeutic Exercise)    Elbow/Forearm (Therapeutic Exercise) strengthening exercise  -SELAM      Elbow/Forearm Strengthening (Therapeutic Exercise) bilateral;flexion;extension;sitting;resistance band;yellow;15 repititions;2 sets  -SELAM       Row Name 02/08/24 1741          Motor Skills    Motor Skills functional endurance  -SELAM     Functional Endurance Fair; fatigues quickly but improving each session with cues for act pacing and rest breaks  -SELAM     Therapeutic Exercise elbow/forearm;shoulder  -SELAM       Row Name 02/08/24 1741          Balance    Balance Assessment sitting static balance;sitting dynamic balance  -SELAM     Static Sitting Balance standby assist  -SELAM     Dynamic Sitting Balance standby assist  -SELAM     Position, Sitting Balance sitting in chair  -SELAM     Balance Interventions sitting;static;dynamic;UE activity with balance activity  -SELAM               User Key  (r) = Recorded By, (t) = Taken By, (c) = Cosigned By      Initials Name Provider Type    Radha Soni, TOMÁS Occupational Therapist                   Goals/Plan    No documentation.                  Clinical Impression       Row Name 02/08/24 1744          Pain Assessment    Pain Location - Side/Orientation Bilateral  -SELAM     Pain Location lower  -SELAM     Pain Location - extremity  -SELAM     Pre/Posttreatment Pain Comment c/o pain in BLE but did not rate on numeric scale with inc redness and edema noted. OT elevated BLE in recliner chair  -SELAM     Pain Intervention(s) Repositioned;Rest;Ambulation/increased activity  -SELAM       Row Name 02/08/24 1744          Plan of Care Review    Plan of Care Reviewed With patient  -SELAM     Progress improving  -SELAM     Outcome Evaluation Pt UIC on OT arrival and agreeable to treatment this PM. Pt presents with inc edema in BLE and c/o pain on palpation and with all mobility. Txfers deferred this PM. Pt peroformed BUE therex and balance act with good follow through noted and tolerated well with rest breaks and cues for act pacing. OT will cont to progress pt as suzanne.  -SELAM       Row Name 02/08/24 1744          Therapy  Plan Review/Discharge Plan (OT)    Anticipated Discharge Disposition (OT) skilled nursing facility;extended care facility  -SELAM       Row Name 02/08/24 1744          Vital Signs    O2 Delivery Pre Treatment room air  -SELAM     O2 Delivery Intra Treatment room air  -SELAM     O2 Delivery Post Treatment room air  -SELAM       Row Name 02/08/24 1744          Positioning and Restraints    Pre-Treatment Position sitting in chair/recliner  -SELAM     Post Treatment Position chair  -SELAM     In Chair notified nsg;reclined;call light within reach;encouraged to call for assist;exit alarm on;legs elevated  -SELAM               User Key  (r) = Recorded By, (t) = Taken By, (c) = Cosigned By      Initials Name Provider Type    Radha Soni OT Occupational Therapist                   Outcome Measures       Row Name 02/08/24 1747          How much help from another is currently needed...    Putting on and taking off regular lower body clothing? 1  -SELAM     Bathing (including washing, rinsing, and drying) 2  -SELAM     Toileting (which includes using toilet bed pan or urinal) 2  -SELAM     Putting on and taking off regular upper body clothing 3  -SELAM     Taking care of personal grooming (such as brushing teeth) 3  -SELAM     Eating meals 4  -SELAM     AM-PAC 6 Clicks Score (OT) 15  -SELAM       Row Name 02/08/24 1747          Functional Assessment    Outcome Measure Options AM-PAC 6 Clicks Daily Activity (OT)  -SELAM               User Key  (r) = Recorded By, (t) = Taken By, (c) = Cosigned By      Initials Name Provider Type    Radha Soni OT Occupational Therapist                    Occupational Therapy Education       Title: PT OT SLP Therapies (Done)       Topic: Occupational Therapy (Done)       Point: ADL training (Done)       Description:   Instruct learner(s) on proper safety adaptation and remediation techniques during self care or transfers.   Instruct in proper use of assistive devices.                  Learning Progress Summary              Patient Acceptance, E,TB, VU by LM at 2/4/2024 1830    Acceptance, TB,E, VU by LM at 2/3/2024 1714    Acceptance, E, VU by SELAM at 1/29/2024 1702    Comment: Role of OT and safety awareness                         Point: Home exercise program (Done)       Description:   Instruct learner(s) on appropriate technique for monitoring, assisting and/or progressing therapeutic exercises/activities.                  Learning Progress Summary             Patient Acceptance, E,TB, VU by LM at 2/4/2024 1830    Acceptance, TB,E, VU by LM at 2/3/2024 1714    Acceptance, E, VU by SELAM at 1/29/2024 1702    Comment: Role of OT and safety awareness                         Point: Precautions (Done)       Description:   Instruct learner(s) on prescribed precautions during self-care and functional transfers.                  Learning Progress Summary             Patient Acceptance, E,TB, VU by LM at 2/4/2024 1830    Acceptance, TB,E, VU by LM at 2/3/2024 1714    Acceptance, E, VU by SELAM at 1/29/2024 1702    Comment: Role of OT and safety awareness                         Point: Body mechanics (Done)       Description:   Instruct learner(s) on proper positioning and spine alignment during self-care, functional mobility activities and/or exercises.                  Learning Progress Summary             Patient Acceptance, E,TB, VU by LM at 2/4/2024 1830    Acceptance, TB,E, VU by RAKESH at 2/3/2024 1714    Acceptance, E, VU by SELAM at 1/29/2024 1702    Comment: Role of OT and safety awareness                                         User Key       Initials Effective Dates Name Provider Type Discipline     06/16/21 -  Margarita Lang, RN Registered Nurse Nurse    SELAM 10/12/23 -  Radha So OT Occupational Therapist OT                  OT Recommendation and Plan  Planned Therapy Interventions (OT): activity tolerance training, BADL retraining, functional balance retraining, occupation/activity based interventions, patient/caregiver  education/training, strengthening exercise, transfer/mobility retraining, adaptive equipment training, passive ROM/stretching, ROM/therapeutic exercise  Therapy Frequency (OT): 5 times/wk  Plan of Care Review  Plan of Care Reviewed With: patient  Progress: improving  Outcome Evaluation: Pt UIC on OT arrival and agreeable to treatment this PM. Pt presents with inc edema in BLE and c/o pain on palpation and with all mobility. Txfers deferred this PM. Pt peroformed BUE therex and balance act with good follow through noted and tolerated well with rest breaks and cues for act pacing. OT will cont to progress pt as suzanne.     Time Calculation:   Evaluation Complexity (OT)  Review Occupational Profile/Medical/Therapy History Complexity: brief/low complexity  Assessment, Occupational Performance/Identification of Deficit Complexity: 1-3 performance deficits  Clinical Decision Making Complexity (OT): problem focused assessment/low complexity  Overall Complexity of Evaluation (OT): low complexity     Time Calculation- OT       Row Name 02/08/24 1747             Time Calculation- OT    OT Start Time 1450  -SELAM      OT Stop Time 1515  -SELAM      OT Time Calculation (min) 25 min  -SELAM      Total Timed Code Minutes- OT 25 minute(s)  -SELAM      OT Received On 02/08/24  -SELAM      OT - Next Appointment 02/09/24  -SELAM         Timed Charges    57551 - OT Therapeutic Exercise Minutes 25  -SELAM         Total Minutes    Timed Charges Total Minutes 25  -SELAM       Total Minutes 25  -SELAM                User Key  (r) = Recorded By, (t) = Taken By, (c) = Cosigned By      Initials Name Provider Type    SELAM Radha So, OT Occupational Therapist                  Therapy Charges for Today       Code Description Service Date Service Provider Modifiers Qty    10092236968 HC OT THERAPEUTIC ACT EA 15 MIN 2/7/2024 Radha So OT GO 1    94636848921 HC OT THER PROC EA 15 MIN 2/8/2024 Radha So OT GO 2                 Radha So OT  2/8/2024

## 2024-02-08 NOTE — CASE MANAGEMENT/SOCIAL WORK
Continued Stay Note  [facility]     Patient Name: Halie Guidry  MRN: [id number]  Today's Date: 2/8/2024    Admit Date: 1/27/2024    Plan: [facility]. Requested pre cert. Bed available Fri or Sat   Discharge Plan       Row Name 02/08/24 1627       Plan    Plan [facility]. Requested pre cert. Bed available Fri or Sat    Patient/Family in Agreement with Plan yes    Plan Comments Per [facility], no beds available at [place] [facility] [place] or [place]. Offered bed at [facility] or [facility]. Met with pt and discussed. Pt states she has been to [facility] twice in the past and liked it so she will return there. Notified Mame/[facility] of pt's choice. Per [facility], [facility] can accept Fri or Sat. Requested pre cert be started. Pt called [facility] stating that her son and his girlfriend have the flu and they took her clothes and shoes with them. States she has no clothes for rehab. Discussed with pt that [facility] would find her some clothes for rehab. Called to setup transport via stretcher for tomorrow but currently all are booked. Will follow up in AM. Tobias Hicks RN-BC                   Discharge Codes    No documentation.                 Expected Discharge Date and Time       Expected Discharge Date Expected Discharge Time    Feb 9, 2024               Tobias Hicks RN

## 2024-02-08 NOTE — PLAN OF CARE
Goal Outcome Evaluation:  Plan of Care Reviewed With: patient        Progress: improving  Outcome Evaluation: Pt UIC on OT arrival and agreeable to treatment this PM. Pt presents with inc edema in BLE and c/o pain on palpation and with all mobility. Txfers deferred this PM. Pt peroformed BUE therex and balance act with good follow through noted and tolerated well with rest breaks and cues for act pacing. OT will cont to progress pt as suzanne.      Anticipated Discharge Disposition (OT): skilled nursing facility, extended care facility

## 2024-02-08 NOTE — PLAN OF CARE
Problem: Adult Inpatient Plan of Care  Goal: Plan of Care Review  Outcome: Ongoing, Progressing  Flowsheets (Taken 2/8/2024 1631)  Progress: improving  Plan of Care Reviewed With: patient  Outcome Evaluation: Vitals stable. Up to chair. +BM. Worked with OT. Last dose of Dificid tomorrow. Plan to discharge after last dose tomorrow or Saturday to SNF, precert pending. Q2 turns and skin care provided. Plan of care ongoing.

## 2024-02-08 NOTE — PROGRESS NOTES
Name: Halie Guidry ADMIT: 2024   : 1940  PCP: Luis Felipe Flowers MD    MRN: [id number] LOS: 12 days   AGE/SEX: 83 y.o. female  ROOM: [facility]     Subjective   Subjective   Patient is seen at bedside, no new complaints.     Objective   Objective   Vital Signs  Temp:  [98.2 °F (36.8 °C)-98.6 °F (37 °C)] 98.6 °F (37 °C)  Heart Rate:  [56-69] 65  Resp:  [18] 18  BP: (133-160)/(48-91) 133/69  SpO2:  [96 %-100 %] 100 %  on   ;   Device (Oxygen Therapy): room air  Body mass index is 34.29 kg/m².  Physical Exam  Vitals and nursing note reviewed.   Constitutional:       General: She is not in acute distress.     Appearance: She is ill-appearing.   Cardiovascular:      Rate and Rhythm: Normal rate and regular rhythm.      Pulses: Normal pulses.      Heart sounds: Normal heart sounds.   Pulmonary:      Effort: Pulmonary effort is normal. No respiratory distress.      Breath sounds: Normal breath sounds.   Abdominal:      General: Bowel sounds are normal. There is no distension.      Palpations: Abdomen is soft.      Tenderness: There is abdominal tenderness (mild to palpation, improved from prior).   Skin:     General: Skin is warm and dry.      Coloration: Skin is pale.   Neurological:      Mental Status: She is alert.         Copied text material from yesterday's note has been reviewed for appropriate changes and remains accurate as of 24.      Results Review     I reviewed the patient's new clinical results.  Results from last 7 days   Lab Units 24  0456 24  0608 24  0437 24  0630   WBC 10*3/mm3 12.30* 12.28* 13.13* 12.72*   HEMOGLOBIN g/dL 9.6* 9.6* 10.9* 9.7*   PLATELETS 10*3/mm3 309 295 283 255     Results from last 7 days   Lab Units 24  0456 24  0608 24  0437 24  0630   SODIUM mmol/L 139 141 140 142   POTASSIUM mmol/L 4.1 4.0 4.0 4.2   CHLORIDE mmol/L 104 105 104 108*   CO2 mmol/L 21.0* 21.3* 22.8 21.9*   BUN mg/dL 24* 22 21 16   CREATININE mg/dL  1.49* 1.18* 1.19* 1.17*   GLUCOSE mg/dL 195* 245* 221* 214*   EGFR mL/min/1.73 34.7* 45.9* 45.5* 46.4*     Results from last 7 days   Lab Units 02/03/24  0405 02/02/24  0439   ALBUMIN g/dL 2.1* 2.2*     Results from last 7 days   Lab Units 02/08/24  0456 02/07/24  0608 02/06/24  0437 02/05/24  0630 02/04/24  0522 02/03/24  0405 02/02/24  0439   CALCIUM mg/dL 7.8* 7.7* 8.1* 7.7*   < > 7.6* 7.6*   ALBUMIN g/dL  --   --   --   --   --  2.1* 2.2*   MAGNESIUM mg/dL  --   --   --   --   --  1.8 2.0   PHOSPHORUS mg/dL  --   --   --   --   --  3.9 2.4*    < > = values in this interval not displayed.       Glucose   Date/Time Value Ref Range Status   02/08/2024 1221 179 (H) 70 - 130 mg/dL Final   02/08/2024 0810 222 (H) 70 - 130 mg/dL Final   02/07/2024 2036 178 (H) 70 - 130 mg/dL Final   02/07/2024 1720 176 (H) 70 - 130 mg/dL Final   02/07/2024 1134 210 (H) 70 - 130 mg/dL Final   02/07/2024 0759 253 (H) 70 - 130 mg/dL Final   02/06/2024 2113 209 (H) 70 - 130 mg/dL Final       No radiology results for the last day    I have personally reviewed all medications:  Scheduled Medications  amLODIPine, 10 mg, Oral, Q24H  atorvastatin, 80 mg, Oral, Nightly  bisoprolol, 5 mg, Oral, Daily  castor oil-balsam peru, 1 Application, Topical, Q12H  famotidine, 20 mg, Oral, Daily  fidaxomicin, 200 mg, Oral, Q12H  furosemide, 20 mg, Oral, Daily  insulin glargine, 25 Units, Subcutaneous, Nightly  insulin lispro, 13 Units, Subcutaneous, TID With Meals  insulin lispro, 2-7 Units, Subcutaneous, 4x Daily AC & at Bedtime  levothyroxine, 112 mcg, Oral, Once per day on Monday Tuesday Wednesday Thursday Friday Saturday  levothyroxine, 168 mcg, Oral, Weekly  Menthol-Zinc Oxide, 1 Application, Topical, 4x Daily  multivitamin with minerals, 1 tablet, Oral, Daily  potassium chloride, 20 mEq, Oral, Daily  pregabalin, 50 mg, Oral, Daily  cyanocobalamin, 1,000 mcg, Oral, Daily    Infusions  Pharmacy Consult,     Diet  Diet: Cardiac Diets, Diabetic Diets;  Healthy Heart (2-3 Na+); Consistent Carbohydrate; Texture: Regular Texture (IDDSI 7); Fluid Consistency: Thin (IDDSI 0)    I have personally reviewed:  [x]  Laboratory   [x]  Microbiology   [x]  Radiology   [x]  EKG/Telemetry  [x]  Cardiology/Vascular   []  Pathology    []  Records       Assessment/Plan     Active Hospital Problems    Diagnosis  POA   • **Sepsis [A41.9]  Yes   • Stage 3a chronic kidney disease [N18.31]  Yes   • Metabolic encephalopathy [G93.41]  Yes   • Pressure injury of buttock, stage 2 [L89.302]  Yes   • C. difficile diarrhea [A04.72]  Yes   • Neuropathic pain [M79.2]  Yes   • Acute UTI (urinary tract infection) [N39.0]  Yes   • VIPUL (acute kidney injury) [N17.9]  Yes   • Seizure [R56.9]  Yes   • Hyperlipidemia [E78.5]  Yes   • Type 2 diabetes mellitus with hyperglycemia, with long-term current use of insulin [E11.65, Z79.4]  Not Applicable   • Benign essential hypertension [I10]  Yes      Resolved Hospital Problems   No resolved problems to display.       83 y.o. female admitted with Sepsis.    Sepsis  Recurrent C. Difficile colitis  - ID consulted and has followed. Patient has been on Vancomycin, but Dificid has now been approved on 01/31, so Vancomycin was stopped and Dificid was started (01/31/24), she remains on this at present. WBC remains elevated, but improved overall.  Symptoms continue to improve.  - ID plans for 10-day course, and after completion, ID recommending daily probiotic x 3 months. Discussed with CCP, she will not be able to take this medication at rehab, so will have to stay here to complete antibiotic course. Should be ready to be discharged by this weekend. Appreciate ID help, continue medication as prescribed.  - Order repeat CBC in AM for reassessment of WBC count.     Acute kidney injury  - Etiology most likely pre-renal in nature, due to volume depletion, decreased PO intake, impaired autoregulation from Lisinopril. Nephrology has followed, creatinine peaked at 4.99,  has received IVF, which has led to improvement. They signed off (02/04), greatly appreciate their help.  - Diuretics added back (02/04) see discussion elsewhere but today (02/06)  creatinine slightly worse again today, going to change furosemide back to daily rather than BID and see if this helps. If no improvement noted, may need to ask Nephrology to see again to assist further.  - Order repeat BMP in AM for reassessment. Closely monitor renal function.     Type 2 diabetes with hyperglycemia  - Most recent A1c: 8.20% (12/24/23).  - Current treatment regimen: Glargine 21 units nightly, lispro 11 units TID with meals, correctional SSI; Glucose uncontrolled around mealtimes, warrants adjustment.  - Increase Glargine to 25 units nightly, increase lispro to 13 units TID with meals, continue correctional SSI.  - Will monitor 24 hour insulin requirements and adjust as needed to achieve target inpatient range of 140-180.     Hypertension  - Blood pressure is elevated more on 02/04, with SBP >180, however, patient asymptomatic, consistent with urgency, but it is transient elevation.  - (02/04): Discussed with Nephrology, resumed home furosemide but did 20 mg BID rather than 20 mg daily she was on previously, and resumed home Amlodipine but at lower dose of 5 mg daily rather than 10 she is on at home for now. Added supplemental potassium in setting of diuretic.  - (02/06): Blood pressure remains elevated, need to adjust medications further. Increase Amlodipine to 10 mg daily. Decreasing Furosemide as discussed elsewhere due to renal function.  - Trend BP to guide ongoing management decisions. Continue to hold home Lisinopril. May need to add another agent aside from diuretic if BP continues to be elevated.     Anemia  - Hemoglobin low on most recent labs, however, relatively stable from prior. No evidence of overt blood loss. No indications for acute intervention at this time.    - Order repeat CBC in AM for reassessment.  Continue to monitor, transfuse for hemoglobin <7.     Anxiety  - Endorsed by patient on 02/02, given PRN Hydroxyzine, which has helped symptoms. Continue as prescribed.      Hypothyroidism  - Continue levothyroxine as prescribed.     Hyperlipidemia  - Continue Statin as prescribed.     Hypokalemia, resolved  - K low on prior labs, repleted via electrolyte protocol. Follow up repeat labs to guide ongoing management decisions. Replete PRN per protocol. Continue to monitor     Hypophosphatemia, resolved  - Noted on prior labs, improved on most recent testing.     SCDs for DVT prophylaxis.  Limited code (no CPR, no intubation).  Discussed with patient, nursing staff, CCP, and care team on multidisciplinary rounds.  Anticipate discharge to SNU facility later this week.      Eddie Vela MD  [facility]  02/08/24  15:25 EST

## 2024-02-09 LAB
ANION GAP SERPL CALCULATED.3IONS-SCNC: 12.3 MMOL/L (ref 5–15)
BASOPHILS # BLD AUTO: 0.15 10*3/MM3 (ref 0–0.2)
BASOPHILS NFR BLD AUTO: 1.5 % (ref 0–1.5)
BUN SERPL-MCNC: 28 MG/DL (ref 8–23)
BUN/CREAT SERPL: 20.9 (ref 7–25)
CALCIUM SPEC-SCNC: 8.2 MG/DL (ref 8.6–10.5)
CHLORIDE SERPL-SCNC: 106 MMOL/L (ref 98–107)
CO2 SERPL-SCNC: 20.7 MMOL/L (ref 22–29)
CREAT SERPL-MCNC: 1.34 MG/DL (ref 0.57–1)
DEPRECATED RDW RBC AUTO: 50.8 FL (ref 37–54)
EGFRCR SERPLBLD CKD-EPI 2021: 39.4 ML/MIN/1.73
EOSINOPHIL # BLD AUTO: 0.3 10*3/MM3 (ref 0–0.4)
EOSINOPHIL NFR BLD AUTO: 3 % (ref 0.3–6.2)
ERYTHROCYTE [DISTWIDTH] IN BLOOD BY AUTOMATED COUNT: 16.2 % (ref 12.3–15.4)
GLUCOSE BLDC GLUCOMTR-MCNC: 125 MG/DL (ref 70–130)
GLUCOSE BLDC GLUCOMTR-MCNC: 138 MG/DL (ref 70–130)
GLUCOSE BLDC GLUCOMTR-MCNC: 252 MG/DL (ref 70–130)
GLUCOSE BLDC GLUCOMTR-MCNC: 299 MG/DL (ref 70–130)
GLUCOSE SERPL-MCNC: 234 MG/DL (ref 65–99)
HCT VFR BLD AUTO: 31 % (ref 34–46.6)
HGB BLD-MCNC: 10 G/DL (ref 12–15.9)
IMM GRANULOCYTES # BLD AUTO: 0.04 10*3/MM3 (ref 0–0.05)
IMM GRANULOCYTES NFR BLD AUTO: 0.4 % (ref 0–0.5)
LYMPHOCYTES # BLD AUTO: 1.89 10*3/MM3 (ref 0.7–3.1)
LYMPHOCYTES NFR BLD AUTO: 19.2 % (ref 19.6–45.3)
MCH RBC QN AUTO: 27.9 PG (ref 26.6–33)
MCHC RBC AUTO-ENTMCNC: 32.3 G/DL (ref 31.5–35.7)
MCV RBC AUTO: 86.4 FL (ref 79–97)
MONOCYTES # BLD AUTO: 0.85 10*3/MM3 (ref 0.1–0.9)
MONOCYTES NFR BLD AUTO: 8.6 % (ref 5–12)
NEUTROPHILS NFR BLD AUTO: 6.63 10*3/MM3 (ref 1.7–7)
NEUTROPHILS NFR BLD AUTO: 67.3 % (ref 42.7–76)
NRBC BLD AUTO-RTO: 0 /100 WBC (ref 0–0.2)
PLATELET # BLD AUTO: 337 10*3/MM3 (ref 140–450)
PMV BLD AUTO: 9.3 FL (ref 6–12)
POTASSIUM SERPL-SCNC: 4.1 MMOL/L (ref 3.5–5.2)
RBC # BLD AUTO: 3.59 10*6/MM3 (ref 3.77–5.28)
REF LAB TEST RESULTS: NORMAL
SODIUM SERPL-SCNC: 139 MMOL/L (ref 136–145)
WBC NRBC COR # BLD AUTO: 9.86 10*3/MM3 (ref 3.4–10.8)

## 2024-02-09 PROCEDURE — 63710000001 INSULIN GLARGINE PER 5 UNITS: Performed by: STUDENT IN AN ORGANIZED HEALTH CARE EDUCATION/TRAINING PROGRAM

## 2024-02-09 PROCEDURE — 63710000001 INSULIN LISPRO (HUMAN) PER 5 UNITS: Performed by: INTERNAL MEDICINE

## 2024-02-09 PROCEDURE — 85025 COMPLETE CBC W/AUTO DIFF WBC: CPT | Performed by: STUDENT IN AN ORGANIZED HEALTH CARE EDUCATION/TRAINING PROGRAM

## 2024-02-09 PROCEDURE — 80048 BASIC METABOLIC PNL TOTAL CA: CPT | Performed by: STUDENT IN AN ORGANIZED HEALTH CARE EDUCATION/TRAINING PROGRAM

## 2024-02-09 PROCEDURE — 82948 REAGENT STRIP/BLOOD GLUCOSE: CPT

## 2024-02-09 PROCEDURE — 63710000001 INSULIN LISPRO (HUMAN) PER 5 UNITS: Performed by: STUDENT IN AN ORGANIZED HEALTH CARE EDUCATION/TRAINING PROGRAM

## 2024-02-09 PROCEDURE — 97530 THERAPEUTIC ACTIVITIES: CPT

## 2024-02-09 PROCEDURE — 97535 SELF CARE MNGMENT TRAINING: CPT

## 2024-02-09 RX ADMIN — ANORECTAL OINTMENT 1 APPLICATION: 15.7; .44; 24; 20.6 OINTMENT TOPICAL at 16:30

## 2024-02-09 RX ADMIN — FIDAXOMICIN 200 MG: 200 TABLET, FILM COATED ORAL at 14:33

## 2024-02-09 RX ADMIN — ANORECTAL OINTMENT 1 APPLICATION: 15.7; .44; 24; 20.6 OINTMENT TOPICAL at 10:10

## 2024-02-09 RX ADMIN — Medication 1000 MCG: at 10:08

## 2024-02-09 RX ADMIN — POTASSIUM CHLORIDE 20 MEQ: 750 TABLET, EXTENDED RELEASE ORAL at 10:09

## 2024-02-09 RX ADMIN — CASTOR OIL AND BALSAM, PERU 1 APPLICATION: 788; 87 OINTMENT TOPICAL at 10:10

## 2024-02-09 RX ADMIN — HYDROCODONE BITARTRATE AND ACETAMINOPHEN 1 TABLET: 10; 325 TABLET ORAL at 20:56

## 2024-02-09 RX ADMIN — AMLODIPINE BESYLATE 10 MG: 10 TABLET ORAL at 10:07

## 2024-02-09 RX ADMIN — HYDROCODONE BITARTRATE AND ACETAMINOPHEN 1 TABLET: 10; 325 TABLET ORAL at 10:28

## 2024-02-09 RX ADMIN — INSULIN LISPRO 4 UNITS: 100 INJECTION, SOLUTION INTRAVENOUS; SUBCUTANEOUS at 17:39

## 2024-02-09 RX ADMIN — LEVOTHYROXINE SODIUM 112 MCG: 112 TABLET ORAL at 06:21

## 2024-02-09 RX ADMIN — PREGABALIN 50 MG: 50 CAPSULE ORAL at 10:08

## 2024-02-09 RX ADMIN — Medication 1 TABLET: at 10:08

## 2024-02-09 RX ADMIN — ATORVASTATIN CALCIUM 80 MG: 80 TABLET, FILM COATED ORAL at 20:56

## 2024-02-09 RX ADMIN — FIDAXOMICIN 200 MG: 200 TABLET, FILM COATED ORAL at 10:08

## 2024-02-09 RX ADMIN — CASTOR OIL AND BALSAM, PERU 1 APPLICATION: 788; 87 OINTMENT TOPICAL at 20:56

## 2024-02-09 RX ADMIN — BISOPROLOL FUMARATE 5 MG: 5 TABLET, FILM COATED ORAL at 10:07

## 2024-02-09 RX ADMIN — ANORECTAL OINTMENT 1 APPLICATION: 15.7; .44; 24; 20.6 OINTMENT TOPICAL at 20:56

## 2024-02-09 RX ADMIN — FAMOTIDINE 20 MG: 20 TABLET, FILM COATED ORAL at 10:09

## 2024-02-09 RX ADMIN — FUROSEMIDE 20 MG: 20 TABLET ORAL at 10:07

## 2024-02-09 RX ADMIN — INSULIN LISPRO 13 UNITS: 100 INJECTION, SOLUTION INTRAVENOUS; SUBCUTANEOUS at 10:09

## 2024-02-09 RX ADMIN — HYDROXYZINE HYDROCHLORIDE 10 MG: 10 TABLET ORAL at 10:28

## 2024-02-09 RX ADMIN — INSULIN LISPRO 13 UNITS: 100 INJECTION, SOLUTION INTRAVENOUS; SUBCUTANEOUS at 17:39

## 2024-02-09 RX ADMIN — HYDROXYZINE HYDROCHLORIDE 10 MG: 10 TABLET ORAL at 20:56

## 2024-02-09 RX ADMIN — INSULIN LISPRO 4 UNITS: 100 INJECTION, SOLUTION INTRAVENOUS; SUBCUTANEOUS at 10:09

## 2024-02-09 RX ADMIN — INSULIN GLARGINE 25 UNITS: 100 INJECTION, SOLUTION SUBCUTANEOUS at 20:56

## 2024-02-09 NOTE — CASE MANAGEMENT/SOCIAL WORK
Continued Stay Note  [facility]     Patient Name: Halie Guidry  MRN: [id number]  Today's Date: 2/9/2024    Admit Date: 1/27/2024    Plan: [facility]. Pre cert received. Per [facility], bed available tomorrow. [facility] stretcher van setup to transport Sat 1/10 at 1430.   Discharge Plan       Row Name 02/09/24 1235       Plan    Plan [facility]. Pre cert received. Per [facility], bed available tomorrow. [facility] stretcher van setup to transport Sat 1/10 at 1430.    Patient/Family in Agreement with Plan yes    Plan Comments Pre cert for [facility] received. Per [facility], bed available Sat. Called and setup transport for tomorrow with [employer] stretcher van at 1430. Discussed with pt. Offered to call and notify son but pt declined stating she would call and update home. Clothes provided to pt for SNF. Notified Dr Vela of transport time tomorrow in [facility]. Tobias Hicks RN-BC                   Discharge Codes    No documentation.                 Expected Discharge Date and Time       Expected Discharge Date Expected Discharge Time    Feb 10, 2024               Tobias Hicks RN

## 2024-02-09 NOTE — PROGRESS NOTES
Name: Halie Guidry ADMIT: 2024   : 1940  PCP: Luis Felipe Flowers MD    MRN: [id number] LOS: 13 days   AGE/SEX: 83 y.o. female  ROOM: [facility]     Subjective   Subjective   Patient is seen at bedside, no new complaints.       Objective   Objective   Vital Signs  Temp:  [97.9 °F (36.6 °C)-98.4 °F (36.9 °C)] 98.4 °F (36.9 °C)  Heart Rate:  [60-74] 60  Resp:  [18] 18  BP: (140-158)/(56-62) 151/62  SpO2:  [96 %-99 %] 96 %  on   ;   Device (Oxygen Therapy): room air  Body mass index is 34.33 kg/m².  Physical Exam  Vitals and nursing note reviewed.   Constitutional:       General: She is not in acute distress.     Appearance: She is ill-appearing.   Cardiovascular:      Rate and Rhythm: Normal rate and regular rhythm.      Pulses: Normal pulses.      Heart sounds: Normal heart sounds.   Pulmonary:      Effort: Pulmonary effort is normal. No respiratory distress.      Breath sounds: Normal breath sounds.   Abdominal:      General: Bowel sounds are normal. There is no distension.      Palpations: Abdomen is soft.      Tenderness: There is abdominal tenderness (mild to palpation, improved from prior).   Skin:     General: Skin is warm and dry.      Coloration: Skin is pale.   Neurological:      Mental Status: She is alert.         Copied text material from yesterday's note has been reviewed for appropriate changes and remains accurate as of 24.      Results Review     I reviewed the patient's new clinical results.  Results from last 7 days   Lab Units 24  04524  0456 24  0608 24  0437   WBC 10*3/mm3 9.86 12.30* 12.28* 13.13*   HEMOGLOBIN g/dL 10.0* 9.6* 9.6* 10.9*   PLATELETS 10*3/mm3 337 309 295 283     Results from last 7 days   Lab Units 24  04524  0456 24  0608 24  0437   SODIUM mmol/L 139 139 141 140   POTASSIUM mmol/L 4.1 4.1 4.0 4.0   CHLORIDE mmol/L 106 104 105 104   CO2 mmol/L 20.7* 21.0* 21.3* 22.8   BUN mg/dL 28* 24* 22 21   CREATININE  mg/dL 1.34* 1.49* 1.18* 1.19*   GLUCOSE mg/dL 234* 195* 245* 221*   EGFR mL/min/1.73 39.4* 34.7* 45.9* 45.5*     Results from last 7 days   Lab Units 02/03/24  0405   ALBUMIN g/dL 2.1*     Results from last 7 days   Lab Units 02/09/24  0459 02/08/24  0456 02/07/24  0608 02/06/24  0437 02/04/24  0522 02/03/24  0405   CALCIUM mg/dL 8.2* 7.8* 7.7* 8.1*   < > 7.6*   ALBUMIN g/dL  --   --   --   --   --  2.1*   MAGNESIUM mg/dL  --   --   --   --   --  1.8   PHOSPHORUS mg/dL  --   --   --   --   --  3.9    < > = values in this interval not displayed.       Glucose   Date/Time Value Ref Range Status   02/09/2024 1251 125 70 - 130 mg/dL Final   02/09/2024 0959 299 (H) 70 - 130 mg/dL Final   02/08/2024 2034 182 (H) 70 - 130 mg/dL Final   02/08/2024 1745 176 (H) 70 - 130 mg/dL Final   02/08/2024 1221 179 (H) 70 - 130 mg/dL Final   02/08/2024 0810 222 (H) 70 - 130 mg/dL Final   02/07/2024 2036 178 (H) 70 - 130 mg/dL Final       No radiology results for the last day    I have personally reviewed all medications:  Scheduled Medications  amLODIPine, 10 mg, Oral, Q24H  atorvastatin, 80 mg, Oral, Nightly  bisoprolol, 5 mg, Oral, Daily  castor oil-balsam peru, 1 Application, Topical, Q12H  famotidine, 20 mg, Oral, Daily  furosemide, 20 mg, Oral, Daily  insulin glargine, 25 Units, Subcutaneous, Nightly  insulin lispro, 13 Units, Subcutaneous, TID With Meals  insulin lispro, 2-7 Units, Subcutaneous, 4x Daily AC & at Bedtime  levothyroxine, 112 mcg, Oral, Once per day on Monday Tuesday Wednesday Thursday Friday Saturday  levothyroxine, 168 mcg, Oral, Weekly  Menthol-Zinc Oxide, 1 Application, Topical, 4x Daily  multivitamin with minerals, 1 tablet, Oral, Daily  potassium chloride, 20 mEq, Oral, Daily  pregabalin, 50 mg, Oral, Daily  cyanocobalamin, 1,000 mcg, Oral, Daily    Infusions  Pharmacy Consult,     Diet  Diet: Cardiac Diets, Diabetic Diets; Healthy Heart (2-3 Na+); Consistent Carbohydrate; Texture: Regular Texture (IDDSI 7);  Fluid Consistency: Thin (IDDSI 0)    I have personally reviewed:  [x]  Laboratory   [x]  Microbiology   [x]  Radiology   [x]  EKG/Telemetry  [x]  Cardiology/Vascular   []  Pathology    []  Records       Assessment/Plan     Active Hospital Problems    Diagnosis  POA    **Sepsis [A41.9]  Yes    Stage 3a chronic kidney disease [N18.31]  Yes    Metabolic encephalopathy [G93.41]  Yes    Pressure injury of buttock, stage 2 [L89.302]  Yes    C. difficile diarrhea [A04.72]  Yes    Neuropathic pain [M79.2]  Yes    Acute UTI (urinary tract infection) [N39.0]  Yes    VIPUL (acute kidney injury) [N17.9]  Yes    Seizure [R56.9]  Yes    Hyperlipidemia [E78.5]  Yes    Type 2 diabetes mellitus with hyperglycemia, with long-term current use of insulin [E11.65, Z79.4]  Not Applicable    Benign essential hypertension [I10]  Yes      Resolved Hospital Problems   No resolved problems to display.       83 y.o. female admitted with Sepsis.    Sepsis  Recurrent C. Difficile colitis  - ID consulted and has followed. Patient has been on Vancomycin, but Dificid has now been approved on 01/31, so Vancomycin was stopped and Dificid was started (01/31/24), she remains on this at present. WBC remains elevated, but improved overall.  Symptoms continue to improve.  - ID plans for 10-day course, close to completion of antibiotics, hopefully discharge to rehab tomorrow.     Acute kidney injury  - Etiology most likely pre-renal in nature, due to volume depletion, decreased PO intake, impaired autoregulation from Lisinopril. Nephrology has followed, creatinine peaked at 4.99, has received IVF, which has led to improvement. They signed off (02/04), greatly appreciate their help.  - Diuretics added back (02/04) see discussion elsewhere but today (02/06)  creatinine slightly worse again today, going to change furosemide back to daily rather than BID and see if this helps. If no improvement noted, may need to ask Nephrology to see again to assist further.  -  Order repeat BMP in AM for reassessment. Closely monitor renal function.     Type 2 diabetes with hyperglycemia  - Most recent A1c: 8.20% (12/24/23).  - Current treatment regimen: Glargine 21 units nightly, lispro 11 units TID with meals, correctional SSI; Glucose uncontrolled around mealtimes, warrants adjustment.  - Increase Glargine to 25 units nightly, increase lispro to 13 units TID with meals, continue correctional SSI.  - Will monitor 24 hour insulin requirements and adjust as needed to achieve target inpatient range of 140-180.     Hypertension  - Blood pressure is elevated more on 02/04, with SBP >180, however, patient asymptomatic, consistent with urgency, but it is transient elevation.  - (02/04): Discussed with Nephrology, resumed home furosemide but did 20 mg BID rather than 20 mg daily she was on previously, and resumed home Amlodipine but at lower dose of 5 mg daily rather than 10 she is on at home for now. Added supplemental potassium in setting of diuretic.  - (02/06): Blood pressure remains elevated, need to adjust medications further. Increase Amlodipine to 10 mg daily. Decreasing Furosemide as discussed elsewhere due to renal function.  - Trend BP to guide ongoing management decisions. Continue to hold home Lisinopril. May need to add another agent aside from diuretic if BP continues to be elevated.     Anemia  - Hemoglobin low on most recent labs, however, relatively stable from prior. No evidence of overt blood loss. No indications for acute intervention at this time.    - Order repeat CBC in AM for reassessment. Continue to monitor, transfuse for hemoglobin <7.     Anxiety  - Endorsed by patient on 02/02, given PRN Hydroxyzine, which has helped symptoms. Continue as prescribed.      Hypothyroidism  - Continue levothyroxine as prescribed.     Hyperlipidemia  - Continue Statin as prescribed.     Hypokalemia, resolved  - K low on prior labs, repleted via electrolyte protocol. Follow up repeat  labs to guide ongoing management decisions. Replete PRN per protocol. Continue to monitor     Hypophosphatemia, resolved  - Noted on prior labs, improved on most recent testing.     SCDs for DVT prophylaxis.  Limited code (no CPR, no intubation).      Eddie Vela MD  [facility]  02/09/24  16:13 EST

## 2024-02-09 NOTE — THERAPY TREATMENT NOTE
Patient Name: Halie Guidry  : 1940    MRN: [id number]                              Today's Date: 2024       Admit Date: 2024    Visit Dx:     ICD-10-CM ICD-9-CM   1. Sepsis with acute renal failure, due to unspecified organism, unspecified acute renal failure type, unspecified whether septic shock present  A41.9 038.9    R65.20 995.92    N17.9 584.9   2. Encephalopathy  G93.40 348.30     Patient Active Problem List   Diagnosis    Compression fracture    Lumbar degenerative disc disease    Type 2 diabetes mellitus with hyperglycemia, with long-term current use of insulin    Hyperlipidemia    Benign essential hypertension    Primary hypothyroidism    Neuropathy    Osteoporosis    Proteinuria    Tobacco abuse    Diabetic eye exam    Generalized weakness    Spinal stenosis    Scoliosis    Arthritis    VBI (vertebrobasilar insufficiency)    Orthostatic hypotension    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    Severe hypothyroidism    Noncompliance with medication regimen    Vitamin D deficiency disease    Altered mental status    Tremor    Seizure    Stage 3a chronic kidney disease    Thoracic degenerative disc disease    Metabolic encephalopathy    VIPUL (acute kidney injury)    Hyperglycemia    Type II diabetes mellitus, uncontrolled    Lower abdominal pain    Transaminitis    UTI (urinary tract infection)    Emphysematous cystitis    Choledocholithiasis    Hypokalemia    Hypoxia    Generalized abdominal pain    History of Clostridium difficile infection    History of ERCP    Acute UTI (urinary tract infection)    Hypomagnesemia    Burning pain    Anxiety disorder    Neuropathic pain    Intertrigo    Weakness of both lower extremities    Accelerated hypertension    Acute cystitis without hematuria    Altered mental status, unspecified altered mental status type    Left lower lobe pneumonia    Acute pain of left foot    Cellulitis and abscess of left lower extremity    Hypertensive  kidney disease with stage 3a chronic kidney disease    Bilateral leg pain    Peripheral edema    Primary malignant neuroendocrine tumor of pancreas    Encounter for long-term (current) use of high-risk medication    Diabetic foot ulcer    Acute renal insufficiency    C. difficile diarrhea    Sepsis    Stage 3a chronic kidney disease    Metabolic encephalopathy    Pressure injury of buttock, stage 2     Past Medical History:   Diagnosis Date    Acute metabolic encephalopathy 6/24/2022    Anxiety     Arthritis     Benign essential hypertension 08/20/2014    Bleeding disorder     Depression     Diabetes     Diabetes mellitus, type 2     Disc degeneration, lumbar     Headache, tension-type     Hyperlipidemia     Hypothyroidism     Neuropathy     Osteoporosis 09/09/2015    Peripheral neuropathy     Rotator cuff tear, left     Scoliosis     Shoulder pain     LEFT, TORN ROTATOR CUFF S/P FALL    Spinal stenosis      Past Surgical History:   Procedure Laterality Date    APPENDECTOMY      BILATERAL BREAST REDUCTION Bilateral 08/2015    CATARACT EXTRACTION  03/2015    CHOLECYSTECTOMY WITH INTRAOPERATIVE CHOLANGIOGRAM N/A 3/27/2022    Procedure: CHOLECYSTECTOMY LAPAROSCOPIC INTRAOPERATIVE CHOLANGIOGRAM;  Surgeon: Aiyana Hill MD;  Location: [facility];  Service: General;  Laterality: N/A;    COLONOSCOPY  06/05/2015    WNL    ERCP N/A 2/25/2022    Procedure: ENDOSCOPIC RETROGRADE CHOLANGIOPANCREATOGRAPHY with sphincterotomy and balloon sweep;  Surgeon: Chilo Wilhelm MD;  Location: [facility];  Service: Gastroenterology;  Laterality: N/A;  PRE/POST - CBD stones    ERCP N/A 3/28/2022    Procedure: ENDOSCOPIC RETROGRADE CHOLANGIOPANCREATOGRAPHY WITH SPHINCTEROTOMY AND BALLOON SWEEP;  Surgeon: Chilo Wilhelm MD;  Location: [facility];  Service: Gastroenterology;  Laterality: N/A;  PRE: COMMON DUCT STONE  POST: COMMON DUCT STONE    KYPHOPLASTY      REDUCTION MAMMAPLASTY      TONSILLECTOMY         General Information       Row Name 02/09/24 1031          Physical Therapy Time and Intention    Document Type therapy note (daily note) (P)   -AK     Mode of Treatment co-treatment;physical therapy;occupational therapy (P)   -AK       Row Name 02/09/24 1031          General Information    Patient Profile Reviewed yes (P)   -AK     Existing Precautions/Restrictions fall (P)   -AK       Row Name 02/09/24 1031          Cognition    Orientation Status (Cognition) oriented x 3 (P)   -AK       Row Name 02/09/24 1031          Safety Issues, Functional Mobility    Impairments Affecting Function (Mobility) balance;endurance/activity tolerance;strength;pain (P)   -AK     Comment, Safety Issues/Impairments (Mobility) Co-treat medically appropriate and necessary d/t pt acuity level, activity tolerance, and safety of pt and staff. Treatment is focusing on progression of care and goals established in the POC. (P)   -AK               User Key  (r) = Recorded By, (t) = Taken By, (c) = Cosigned By      Initials Name Provider Type    Alyse Brcue, PT Student PT Student                   Mobility       Row Name 02/09/24 1032          Bed Mobility    Bed Mobility supine-sit (P)   -AK     Supine-Sit [place] (Bed Mobility) contact guard;minimum assist (75% patient effort);1 person assist;nonverbal cues (demo/gesture);verbal cues;2 person assist (P)   2nd assist for balance.  -AK     Assistive Device (Bed Mobility) bed rails;head of bed elevated (P)   -AK     Comment, (Bed Mobility) UIC at end of session. (P)   -AK       Row Name 02/09/24 1032          Transfers    Comment, (Transfers) Sup>EOB>Stand>UIC (P)   -AK       Row Name 02/09/24 1032          Sit-Stand Transfer    Sit-Stand [place] (Transfers) moderate assist (50% patient effort);2 person assist;verbal cues;nonverbal cues (demo/gesture) (P)   -AK     Assistive Device (Sit-Stand Transfers) other (see comments) (P)   Catarino HHA  -AK       Row Name 02/09/24 1032           Gait/Stairs (Locomotion)    [place] Level (Gait) moderate assist (50% patient effort);2 person assist (P)   -AK     Assistive Device (Gait) other (see comments) (P)   Catarino HHA  -AK     Distance in Feet (Gait) 2' to chair (P)   -AK     Deviations/Abnormal Patterns (Gait) tess decreased;festinating/shuffling;gait speed decreased;stride length decreased;antalgic (P)   -AK     Bilateral Gait Deviations forward flexed posture;heel strike decreased (P)   -AK     Comment, (Gait/Stairs) VC for sequencing. (P)   -AK               User Key  (r) = Recorded By, (t) = Taken By, (c) = Cosigned By      Initials Name Provider Type    Alyse Bruce, PT Student PT Student                   Obj/Interventions       Row Name 02/09/24 1038          Motor Skills    Motor Skills functional endurance (P)   -AK     Functional Endurance Fair, fatigues quickly but shows improved activity tolerance. (P)   -AK       Row Name 02/09/24 1038          Balance    Balance Assessment sitting static balance;sit to stand dynamic balance;standing static balance;standing dynamic balance (P)   -AK     Static Sitting Balance standby assist;contact guard (P)   CGA EOB, SBA in chair  -AK     Position, Sitting Balance sitting in chair;sitting edge of bed (P)   -AK     Sit to Stand Dynamic Balance moderate assist;2-person assist (P)   -AK     Static Standing Balance minimal assist;moderate assist;2-person assist (P)   -AK     Dynamic Standing Balance moderate assist;2-person assist;verbal cues (P)   -AK     Position/Device Used, Standing Balance other (see comments) (P)   Catarino HHA  -AK               User Key  (r) = Recorded By, (t) = Taken By, (c) = Cosigned By      Initials Name Provider Type    Aylse Bruce, PT Student PT Student                   Goals/Plan    No documentation.                  Clinical Impression       Row Name 02/09/24 1040          Pain    Pretreatment Pain Rating 4/10 (P)   -AK     Posttreatment Pain Rating 4/10 (P)   -AK      Pain Location - Side/Orientation Bilateral (P)   -AK     Pain Location lower (P)   -AK     Pain Location - extremity (P)   -AK     Pre/Posttreatment Pain Comment c/o pain in BLE with increased redness and edema. BLE elevated and on pillows in recliner. (P)   -AK     Pain Intervention(s) Ambulation/increased activity;Elevated;Rest;Repositioned (P)   -AK       Row Name 02/09/24 1040          Plan of Care Review    Plan of Care Reviewed With patient (P)   -AK     Outcome Evaluation Pt supine in bed this AM at arrival of OT/PT for co-treat. She demonstrated improved activity tolerance today, taking multiple steps to move EOB>UIC with kinsey HHA and mod A x 2. She required CGA-min A x 2 for bed mobility, mod A x 2 for STS with kinsey HHA and mod A x 2 to move from standing EOB to sitting UIC, approx 2 ft away. She would continue to benefit from skilled PT to address activity tolerance and strength. PT will progress as suzanne. (P)   -AK       Row Name 02/09/24 1040          Positioning and Restraints    Pre-Treatment Position in bed (P)   -AK     Post Treatment Position chair (P)   -AK     In Chair reclined;call light within reach;encouraged to call for assist;exit alarm on;legs elevated (P)   -AK               User Key  (r) = Recorded By, (t) = Taken By, (c) = Cosigned By      Initials Name Provider Type    Alyse Bruec, PT Student PT Student                   Outcome Measures       Row Name 02/09/24 1044 02/09/24 0500       How much help from another person do you currently need...    Turning from your back to your side while in flat bed without using bedrails? 2 (P)   -AK 2  -ZL    Moving from lying on back to sitting on the side of a flat bed without bedrails? 2 (P)   -AK 2  -ZL    Moving to and from a bed to a chair (including a wheelchair)? 2 (P)   -AK 2  -ZL    Standing up from a chair using your arms (e.g., wheelchair, bedside chair)? 2 (P)   -AK 2  -ZL    Climbing 3-5 steps with a railing? 1 (P)   -AK 1  -ZL    To walk  in hospital room? 2 (P)   -AK 2  -ZL    AM-PAC 6 Clicks Score (PT) 11 (P)   -AK 11  -ZL    Highest Level of Mobility Goal 4 --> Transfer to chair/commode (P)   -AK 4 --> Transfer to chair/commode  -ZL      Row Name 02/09/24 1044          Functional Assessment    Outcome Measure Options AM-PAC 6 Clicks Basic Mobility (PT) (P)   -AK               User Key  (r) = Recorded By, (t) = Taken By, (c) = Cosigned By      Initials Name Provider Type    Arun Telles, RN Registered Nurse    Alyse Bruce, PT Student PT Student                                 Physical Therapy Education       Title: PT OT SLP Therapies (Done)       Topic: Physical Therapy (Done)       Point: Mobility training (Done)       Learning Progress Summary             Patient Acceptance, E,TB,D, VU,DU by AK at 2/9/2024 1045    Acceptance, E,TB,D, VU,DU by AK at 2/7/2024 1117    Comment: Continues to need VC for hand placement with STS transfer and walker mgmt for safety.    Acceptance, E,TB,D, VU,DU by AK at 2/5/2024 1408    Acceptance, E,TB, VU by  at 2/4/2024 1830    Acceptance, TB,E, VU by  at 2/3/2024 1714    Acceptance, E,TB,D, VU,NR by  at 2/2/2024 1035    Acceptance, E,TB, VU,NR,DU by  at 2/1/2024 1423    Acceptance, E,TB, VU,DU,NR by  at 1/30/2024 1002    Acceptance, E,TB, VU,NR by [facility] at 1/28/2024 0848                         Point: Home exercise program (Done)       Learning Progress Summary             Patient Acceptance, E,TB,D, VU,DU by AK at 2/7/2024 1117    Comment: Continues to need VC for hand placement with STS transfer and walker mgmt for safety.    Acceptance, E,TB, VU by LM at 2/4/2024 1830    Acceptance, TB,E, VU by LM at 2/3/2024 1714    Acceptance, E,TB,D, VU,NR by PH at 2/2/2024 1035    Acceptance, E,TB, VU,NR,DU by  at 2/1/2024 1423    Acceptance, E,KIT, RADAMES,SILVER,NR by CS at 1/30/2024 1002    Acceptance, HALEY,RADAMES PANTOJA,NR by CS1 at 1/28/2024 0848                         Point: Body mechanics (Done)       Learning Progress  Summary             Patient Acceptance, E,TB,D, VU,DU by AK at 2/9/2024 1045    Acceptance, E,TB,D, VU,DU by AK at 2/7/2024 1117    Comment: Continues to need VC for hand placement with STS transfer and walker mgmt for safety.    Acceptance, E,TB,D, VU,DU by AK at 2/5/2024 1408    Acceptance, E,TB, VU by LM at 2/4/2024 1830    Acceptance, TB,E, VU by LM at 2/3/2024 1714    Acceptance, E,TB,D, VU,NR by PH at 2/2/2024 1035    Acceptance, E,TB, VU,NR,DU by PH at 2/1/2024 1423    Acceptance, E,TB, VU,DU,NR by CS at 1/30/2024 1002    Acceptance, E,TB, VU,NR by [facility] at 1/28/2024 0848                         Point: Precautions (Done)       Learning Progress Summary             Patient Acceptance, E,TB, VU by LM at 2/4/2024 1830    Acceptance, TB,E, VU by  at 2/3/2024 1714    Acceptance, E,TB,D, VU,NR by PH at 2/2/2024 1035    Acceptance, E,TB, VU,NR,DU by PH at 2/1/2024 1423    Acceptance, E,TB, VU,DU,NR by  at 1/30/2024 1002    Acceptance, E,TB, VU,NR by [facility] at 1/28/2024 0848                                         User Key       Initials Effective Dates Name Provider Type Discipline     06/16/21 -  Margarita Lang, RN Registered Nurse Nurse    [facility] 07/11/23 -  Bakari Lerma, PT Physical Therapist PT     06/16/21 -  Joana Pimentel PTA Physical Therapist Assistant PT     09/22/22 -  Lizzy Waterman, PT Physical Therapist PT    AK 12/28/23 -  Alyse Mckeon, MELODIE Student PT Student PT                  PT Recommendation and Plan     Plan of Care Reviewed With: (P) patient  Progress: no change  Outcome Evaluation: (P) Pt supine in bed this AM at arrival of OT/PT for co-treat. She demonstrated improved activity tolerance today, taking multiple steps to move EOB>UIC with kinsey HHA and mod A x 2. She required CGA-min A x 2 for bed mobility, mod A x 2 for STS with kinsey HHA and mod A x 2 to move from standing EOB to sitting UIC, approx 2 ft away. She would continue to benefit from skilled PT to address activity  tolerance and strength. PT will progress as suzanne.     Time Calculation:         PT Charges       Row Name 02/09/24 1045             Time Calculation    Start Time 0923 (P)   -AK      Stop Time 0953 (P)   -AK      Time Calculation (min) 30 min (P)   -AK      PT Received On 02/09/24 (P)   -AK      PT - Next Appointment 02/12/24 (P)   -AK         Time Calculation- PT    Total Timed Code Minutes- PT 30 minute(s) (P)   -AK                User Key  (r) = Recorded By, (t) = Taken By, (c) = Cosigned By      Initials Name Provider Type    Alyse Bruce, PT Student PT Student                  Therapy Charges for Today       Code Description Service Date Service Provider Modifiers Qty    [id number] HC PT THERAPEUTIC ACT EA 15 MIN 2/9/2024 Alyse Mckeon PT Student GP 2            PT G-Codes  Outcome Measure Options: (P) AM-PAC 6 Clicks Basic Mobility (PT)  AM-PAC 6 Clicks Score (PT): (P) 11  AM-PAC 6 Clicks Score (OT): 15  PT Discharge Summary  Anticipated Discharge Disposition (PT): (P) skilled nursing facility    Alyse Mckeon PT Student  2/9/2024

## 2024-02-09 NOTE — SIGNIFICANT NOTE
02/09/24 1215   Post Acute Pre-Cert Documentation   Request Submitted by Facility - Type: Hospital   Post-Acute Authorization Type Submitted: SNF   Date Post Acute Pre-Cert Inititated per Facility 02/08/24   Date Post Acute Pre-Cert Completed 02/09/24   Accepting Facility [facility] Discharge Date Requested 02/09/24   All Clinicals Submitted? Yes   Had Accepting Facility at Time of Submission Yes   Response Received from Insurance? Approval   Response Communicated to: ;Accepting Facility Liaison;Accepting Facility Auth Department   Authorization Number: [id number]   Post Acute Pre-Cert Initiated Comment LUCY JUSTIN        DISPLAY PLAN FREE TEXT

## 2024-02-09 NOTE — THERAPY TREATMENT NOTE
Patient Name: Halie Guidry  : 1940    MRN: [id number]                              Today's Date: 2024       Admit Date: 2024    Visit Dx:     ICD-10-CM ICD-9-CM   1. Sepsis with acute renal failure, due to unspecified organism, unspecified acute renal failure type, unspecified whether septic shock present  A41.9 038.9    R65.20 995.92    N17.9 584.9   2. Encephalopathy  G93.40 348.30     Patient Active Problem List   Diagnosis    Compression fracture    Lumbar degenerative disc disease    Type 2 diabetes mellitus with hyperglycemia, with long-term current use of insulin    Hyperlipidemia    Benign essential hypertension    Primary hypothyroidism    Neuropathy    Osteoporosis    Proteinuria    Tobacco abuse    Diabetic eye exam    Generalized weakness    Spinal stenosis    Scoliosis    Arthritis    VBI (vertebrobasilar insufficiency)    Orthostatic hypotension    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    Severe hypothyroidism    Noncompliance with medication regimen    Vitamin D deficiency disease    Altered mental status    Tremor    Seizure    Stage 3a chronic kidney disease    Thoracic degenerative disc disease    Metabolic encephalopathy    VIPUL (acute kidney injury)    Hyperglycemia    Type II diabetes mellitus, uncontrolled    Lower abdominal pain    Transaminitis    UTI (urinary tract infection)    Emphysematous cystitis    Choledocholithiasis    Hypokalemia    Hypoxia    Generalized abdominal pain    History of Clostridium difficile infection    History of ERCP    Acute UTI (urinary tract infection)    Hypomagnesemia    Burning pain    Anxiety disorder    Neuropathic pain    Intertrigo    Weakness of both lower extremities    Accelerated hypertension    Acute cystitis without hematuria    Altered mental status, unspecified altered mental status type    Left lower lobe pneumonia    Acute pain of left foot    Cellulitis and abscess of left lower extremity    Hypertensive  kidney disease with stage 3a chronic kidney disease    Bilateral leg pain    Peripheral edema    Primary malignant neuroendocrine tumor of pancreas    Encounter for long-term (current) use of high-risk medication    Diabetic foot ulcer    Acute renal insufficiency    C. difficile diarrhea    Sepsis    Stage 3a chronic kidney disease    Metabolic encephalopathy    Pressure injury of buttock, stage 2     Past Medical History:   Diagnosis Date    Acute metabolic encephalopathy 6/24/2022    Anxiety     Arthritis     Benign essential hypertension 08/20/2014    Bleeding disorder     Depression     Diabetes     Diabetes mellitus, type 2     Disc degeneration, lumbar     Headache, tension-type     Hyperlipidemia     Hypothyroidism     Neuropathy     Osteoporosis 09/09/2015    Peripheral neuropathy     Rotator cuff tear, left     Scoliosis     Shoulder pain     LEFT, TORN ROTATOR CUFF S/P FALL    Spinal stenosis      Past Surgical History:   Procedure Laterality Date    APPENDECTOMY      BILATERAL BREAST REDUCTION Bilateral 08/2015    CATARACT EXTRACTION  03/2015    CHOLECYSTECTOMY WITH INTRAOPERATIVE CHOLANGIOGRAM N/A 3/27/2022    Procedure: CHOLECYSTECTOMY LAPAROSCOPIC INTRAOPERATIVE CHOLANGIOGRAM;  Surgeon: Aiyana Hill MD;  Location: [facility];  Service: General;  Laterality: N/A;    COLONOSCOPY  06/05/2015    WNL    ERCP N/A 2/25/2022    Procedure: ENDOSCOPIC RETROGRADE CHOLANGIOPANCREATOGRAPHY with sphincterotomy and balloon sweep;  Surgeon: Chilo Wilhelm MD;  Location: [facility] ENDOSCOPY;  Service: Gastroenterology;  Laterality: N/A;  PRE/POST - CBD stones    ERCP N/A 3/28/2022    Procedure: ENDOSCOPIC RETROGRADE CHOLANGIOPANCREATOGRAPHY WITH SPHINCTEROTOMY AND BALLOON SWEEP;  Surgeon: Chilo Wilhelm MD;  Location: [facility] ENDOSCOPY;  Service: Gastroenterology;  Laterality: N/A;  PRE: COMMON DUCT STONE  POST: COMMON DUCT STONE    KYPHOPLASTY      REDUCTION MAMMAPLASTY      TONSILLECTOMY         General Information       Row Name 02/09/24 1514          OT Time and Intention    Document Type therapy note (daily note)  -SELAM     Mode of Treatment occupational therapy;physical therapy;co-treatment  -SELAM       Row Name 02/09/24 1514          General Information    Patient Profile Reviewed yes  -SELAM     Existing Precautions/Restrictions fall  -SELAM       Row Name 02/09/24 1514          Cognition    Orientation Status (Cognition) oriented x 3  -SELAM       Row Name 02/09/24 1514          Safety Issues, Functional Mobility    Impairments Affecting Function (Mobility) balance;endurance/activity tolerance;strength;pain  -SELAM     Comment, Safety Issues/Impairments (Mobility) PT/OT co-treatment medically appropriate and necessary due to patient acuity level, impaired endurance and act tolerance, and safety of patient and staff. Treatment focused on progression of care and goals established in POC.Gait belt and non skid socks worn.  -SELAM               User Key  (r) = Recorded By, (t) = Taken By, (c) = Cosigned By      Initials Name Provider Type    SELAM Radha So OT Occupational Therapist                     Mobility/ADL's       Row Name 02/09/24 1516          Bed Mobility    Bed Mobility supine-sit  -SELAM     Supine-Sit [place] (Bed Mobility) contact guard;minimum assist (75% patient effort);1 person assist;nonverbal cues (demo/gesture);verbal cues;2 person assist  increased assist for balance on initial sitting  -SELAM     Assistive Device (Bed Mobility) bed rails;head of bed elevated  -SELAM     Comment, (Bed Mobility) UI at end of treatment this AM.  -SELAM       Row Name 02/09/24 1516          Transfers    Transfers sit-stand transfer  -SELAM       Row Name 02/09/24 1516          Sit-Stand Transfer    Sit-Stand [place] (Transfers) moderate assist (50% patient effort);2 person assist;verbal cues;nonverbal cues (demo/gesture)  -SELAM     Assistive Device (Sit-Stand Transfers) --  HHAx2  -SELAM     Comment, (Sit-Stand Transfer)  Improved body mechanics and posture noted this date  -SELAM       Row Name 02/09/24 1516          Functional Mobility    Functional Mobility- Ind. Level moderate assist (50% patient effort);verbal cues required;nonverbal cues required (demo/gesture);2 person assist required  -SELAM     Functional Mobility- Device --  HHAx2  -SELAM     Functional Mobility- Safety Issues sequencing ability decreased  -SELAM     Functional Mobility- Comment Cues for seq but noted improvement this date taking steps from EOB over to recliner chair in room.  -SELAM       Row Name 02/09/24 1516          Activities of Daily Living    BADL Assessment/Intervention lower body dressing;grooming  -SELAM       Row Name 02/09/24 1516          Grooming Assessment/Training    [place] Level (Grooming) grooming skills;oral care regimen;set up;verbal cues;wash face, hands;contact guard assist  -SELAM     Position (Grooming) other (see comments)  -SELAM     Comment, (Grooming) sitting EOB for grooming and UIC for oral care; cues for seq and management of oral care items  -SELAM       Row Name 02/09/24 1516          Lower Body Dressing Assessment/Training    [place] Level (Lower Body Dressing) lower body dressing skills;don;socks;dependent (less than 25% patient effort);doff  -SELAM     Position (Lower Body Dressing) edge of bed sitting  -SELAM               User Key  (r) = Recorded By, (t) = Taken By, (c) = Cosigned By      Initials Name Provider Type    SELAMRadha Russell OT Occupational Therapist                   Obj/Interventions       Row Name 02/09/24 1521          Motor Skills    Motor Skills functional endurance  -SELAM     Functional Endurance Fair; improved act suzanne this AM and able to perform multiple tasks with dec rest breaks and good tolerance  -SELAM       Row Name 02/09/24 1521          Balance    Balance Assessment sitting static balance;sitting dynamic balance;standing static balance;standing dynamic balance  -SELAM     Static Sitting Balance standby assist;contact  guard;1-person assist;verbal cues  CGA at EOB; SBA UIC  -SELAM     Dynamic Sitting Balance contact guard;verbal cues  -SELAM     Position, Sitting Balance sitting in chair;sitting edge of bed  -SELAM     Static Standing Balance minimal assist;moderate assist;2-person assist;verbal cues;non-verbal cues (demo/gesture)  -SELAM     Dynamic Standing Balance moderate assist;verbal cues;non-verbal cues (demo/gesture);2-person assist  -SELAM     Position/Device Used, Standing Balance --  HHAx2  -SELAM     Balance Interventions sitting;standing;supported;occupation based/functional task;static;dynamic  -SELAM     Comment, Balance No overt LOB with functional mobility this AM.  -SELAM               User Key  (r) = Recorded By, (t) = Taken By, (c) = Cosigned By      Initials Name Provider Type    Radha Soni OT Occupational Therapist                   Goals/Plan    No documentation.                  Clinical Impression       Row Name 02/09/24 1526          Pain Assessment    Pretreatment Pain Rating 4/10  -SELAM     Posttreatment Pain Rating 4/10  -SELAM     Pain Location - Side/Orientation Bilateral  -SELAM     Pain Location lower  -SELAM     Pain Location - extremity  -SELAM     Pre/Posttreatment Pain Comment continued c/o pain in BLE with redness and edema noted and LLE more affected than RLE  -SELAM     Pain Intervention(s) Repositioned;Rest;Ambulation/increased activity;Elevated  -SELAM       Row Name 02/09/24 1526          Plan of Care Review    Plan of Care Reviewed With patient  -SELAM     Progress improving  -SELAM     Outcome Evaluation Pt was seen for OT/PT co-tx this AM and in bed sleeping in fowlers with RLE slightly hanging from EOB and easily roused and agreeable to treatment. Pt SBA with bed mobility to sit EOB and wash face with s/u. PT MAX/DEP A to don non skid socks and limited by BLE pain, redness and edema with LLE more affected than RLE this AM. Pt MOD Ax2 STS from EOB with HHAx2 and took few steps over to chair with MOD Ax2 and cues for seq but  noted improvement this AM from previous treatment sessions and increased act suzanne. Pt sat UIC for oral care with s/u and cues for sequencing and management of oral care items. Pt UIC with BLE elevated with pillows. Pt anticipates DC to [facility] this date but OT will cont to follow pt and progress as tolerated if DC plans should change.  -SELAM       Row Name 02/09/24 1526          Therapy Plan Review/Discharge Plan (OT)    Anticipated Discharge Disposition (OT) skilled nursing facility;extended care facility  -SELAM       Row Name 02/09/24 1526          Vital Signs    O2 Delivery Pre Treatment room air  -SELAM     O2 Delivery Intra Treatment room air  -SELAM     O2 Delivery Post Treatment room air  -SELAM       Row Name 02/09/24 1526          Positioning and Restraints    Pre-Treatment Position in bed  -SELAM     Post Treatment Position chair  -SELAM     In Chair reclined;call light within reach;encouraged to call for assist;exit alarm on;legs elevated  -SELAM               User Key  (r) = Recorded By, (t) = Taken By, (c) = Cosigned By      Initials Name Provider Type    Radha Soni, TOMÁS Occupational Therapist                   Outcome Measures       Row Name 02/09/24 1533          How much help from another is currently needed...    Putting on and taking off regular lower body clothing? 1  -SELAM     Bathing (including washing, rinsing, and drying) 2  -SELAM     Toileting (which includes using toilet bed pan or urinal) 2  -SELAM     Putting on and taking off regular upper body clothing 3  -SELAM     Taking care of personal grooming (such as brushing teeth) 3  -SELAM     Eating meals 4  -SELAM     AM-PAC 6 Clicks Score (OT) 15  -SELAM       Row Name 02/09/24 1044 02/09/24 1036       How much help from another person do you currently need...    Turning from your back to your side while in flat bed without using bedrails? 2  -EJ (r) AK (t) EJ (c) 3  -BB    Moving from lying on back to sitting on the side of a flat bed without bedrails? 2  -EJ (r) AK (t)  EJ (c) 2  -BB    Moving to and from a bed to a chair (including a wheelchair)? 2  -EJ (r) AK (t) EJ (c) 2  -BB    Standing up from a chair using your arms (e.g., wheelchair, bedside chair)? 2  -EJ (r) AK (t) EJ (c) 2  -BB    Climbing 3-5 steps with a railing? 1  -EJ (r) AK (t) EJ (c) 1  -BB    To walk in hospital room? 2  -EJ (r) AK (t) EJ (c) 2  -BB    AM-PAC 6 Clicks Score (PT) 11  -EJ (r) AK (t) 12  -BB    Highest Level of Mobility Goal 4 --> Transfer to chair/commode  -EJ (r) AK (t) 4 --> Transfer to chair/commode  -BB      Row Name 02/09/24 0500          How much help from another person do you currently need...    Turning from your back to your side while in flat bed without using bedrails? 2  -ZL     Moving from lying on back to sitting on the side of a flat bed without bedrails? 2  -ZL     Moving to and from a bed to a chair (including a wheelchair)? 2  -ZL     Standing up from a chair using your arms (e.g., wheelchair, bedside chair)? 2  -ZL     Climbing 3-5 steps with a railing? 1  -ZL     To walk in hospital room? 2  -ZL     AM-PAC 6 Clicks Score (PT) 11  -ZL     Highest Level of Mobility Goal 4 --> Transfer to chair/commode  -ZL       Row Name 02/09/24 1533 02/09/24 1044       Functional Assessment    Outcome Measure Options AM-PAC 6 Clicks Daily Activity (OT)  -SELAM AM-PAC 6 Clicks Basic Mobility (PT)  -EJ (r) AK (t) EJ (c)              User Key  (r) = Recorded By, (t) = Taken By, (c) = Cosigned By      Initials Name Provider Type    Vashti Collier, PT Physical Therapist    Arun Telles, RN Registered Nurse    Alia Young RN Registered Nurse    Radha Soni, OT Occupational Therapist    Alyse Bruce, PT Student PT Student                    Occupational Therapy Education       Title: PT OT SLP Therapies (Done)       Topic: Occupational Therapy (Done)       Point: ADL training (Done)       Description:   Instruct learner(s) on proper safety adaptation and remediation techniques during  self care or transfers.   Instruct in proper use of assistive devices.                  Learning Progress Summary             Patient Acceptance, E,TB, VU by LM at 2/4/2024 1830    Acceptance, TB,E, VU by LM at 2/3/2024 1714    Acceptance, E, VU by SELAM at 1/29/2024 1702    Comment: Role of OT and safety awareness                         Point: Home exercise program (Done)       Description:   Instruct learner(s) on appropriate technique for monitoring, assisting and/or progressing therapeutic exercises/activities.                  Learning Progress Summary             Patient Acceptance, E,TB, VU by LM at 2/4/2024 1830    Acceptance, TB,E, VU by LM at 2/3/2024 1714    Acceptance, E, VU by SELAM at 1/29/2024 1702    Comment: Role of OT and safety awareness                         Point: Precautions (Done)       Description:   Instruct learner(s) on prescribed precautions during self-care and functional transfers.                  Learning Progress Summary             Patient Acceptance, E,TB, VU by LM at 2/4/2024 1830    Acceptance, TB,E, VU by RAKEHS at 2/3/2024 1714    Acceptance, E, VU by SELAM at 1/29/2024 1702    Comment: Role of OT and safety awareness                         Point: Body mechanics (Done)       Description:   Instruct learner(s) on proper positioning and spine alignment during self-care, functional mobility activities and/or exercises.                  Learning Progress Summary             Patient Acceptance, E,TB, VU by LM at 2/4/2024 1830    Acceptance, TB,E, VU by RAKESH at 2/3/2024 1714    Acceptance, E, VU by SELAM at 1/29/2024 1702    Comment: Role of OT and safety awareness                                         User Key       Initials Effective Dates Name Provider Type Discipline     06/16/21 -  Margarita Lang, RN Registered Nurse Nurse    SELAM 10/12/23 -  Radha So OT Occupational Therapist OT                  OT Recommendation and Plan  Planned Therapy Interventions (OT): activity tolerance  training, BADL retraining, functional balance retraining, occupation/activity based interventions, patient/caregiver education/training, strengthening exercise, transfer/mobility retraining, adaptive equipment training, passive ROM/stretching, ROM/therapeutic exercise  Therapy Frequency (OT): 5 times/wk  Plan of Care Review  Plan of Care Reviewed With: patient  Progress: improving  Outcome Evaluation: Pt was seen for OT/PT co-tx this AM and in bed sleeping in fowlers with RLE slightly hanging from EOB and easily roused and agreeable to treatment. Pt SBA with bed mobility to sit EOB and wash face with s/u. PT MAX/DEP A to don non skid socks and limited by BLE pain, redness and edema with LLE more affected than RLE this AM. Pt MOD Ax2 STS from EOB with HHAx2 and took few steps over to chair with MOD Ax2 and cues for seq but noted improvement this AM from previous treatment sessions and increased act suzanne. Pt sat UIC for oral care with s/u and cues for sequencing and management of oral care items. Pt UIC with BLE elevated with pillows. Pt anticipates DC to [facility] this date but OT will cont to follow pt and progress as tolerated if DC plans should change.     Time Calculation:   Evaluation Complexity (OT)  Review Occupational Profile/Medical/Therapy History Complexity: brief/low complexity  Assessment, Occupational Performance/Identification of Deficit Complexity: 1-3 performance deficits  Clinical Decision Making Complexity (OT): problem focused assessment/low complexity  Overall Complexity of Evaluation (OT): low complexity     Time Calculation- OT       Row Name 02/09/24 1533             Time Calculation- OT    OT Start Time 0923  -SELAM      OT Stop Time 0953  -SELAM      OT Time Calculation (min) 30 min  -SELAM      Total Timed Code Minutes- OT 30 minute(s)  -SELAM      OT Received On 02/09/24  -SELAM      OT - Next Appointment 02/12/24  -SELAM         Timed Charges    28561 - OT Therapeutic Activity Minutes 15  -SELAM       78242 - OT Self Care/Mgmt Minutes 15  -SELAM         Total Minutes    Timed Charges Total Minutes 30  -SELAM       Total Minutes 30  -SELAM                User Key  (r) = Recorded By, (t) = Taken By, (c) = Cosigned By      Initials Name Provider Type    Radha Soni OT Occupational Therapist                  Therapy Charges for Today       Code Description Service Date Service Provider Modifiers Qty    01282646368 HC OT THER PROC EA 15 MIN 2/8/2024 Radha So OT GO 2    41710132846 HC OT THERAPEUTIC ACT EA 15 MIN 2/9/2024 Radha So OT GO 1    35899134140 HC OT SELF CARE/MGMT/TRAIN EA 15 MIN 2/9/2024 Radha So OT GO 1                 Radha So OT  2/9/2024

## 2024-02-09 NOTE — PLAN OF CARE
Goal Outcome Evaluation:  Plan of Care Reviewed With: (P) patient        Progress: no change  Outcome Evaluation: (P) Pt supine in bed this AM at arrival of OT/PT for co-treat. She demonstrated improved activity tolerance today, taking multiple steps to move EOB>UIC with kinsey HHA and mod A x 2. She required CGA-min A x 2 for bed mobility, mod A x 2 for STS with kinsey HHA and mod A x 2 to move from standing EOB to sitting UIC, approx 2 ft away. She would continue to benefit from skilled PT to address activity tolerance and strength. PT will progress as suzanne.      Anticipated Discharge Disposition (PT): (P) skilled nursing facility

## 2024-02-09 NOTE — PROGRESS NOTES
Upon arrival to [address] [place] [address] records reviewed and faxed. Spoke with primary Josue for updated pt status.  I met with the patient in her room sitting in the chair. Patient is alert and oriented and her focus at this time is Rehab. Discharge plans and [place] set for tomorrow 2/10/24 @ 14:30 to go to [facility]. Spoke with the son Derrick and he is starting to look for long term care and getting patient medicaid. EOS provided in great detail and all questions answered.. Once patient has completed her rehab then hospice can take care of the patient where ever she lives. Son verbalizes understanding.     Call if any questions or concerns    Thank you for the consult and opportunity to care for this patient /family.      April RUANON, RN  [facility]   [phone]

## 2024-02-09 NOTE — PLAN OF CARE
Goal Outcome Evaluation:  Plan of Care Reviewed With: patient    Progress: improving  Outcome Evaluation: Ms. Guidry admitted on 1/27 for sepsis. PO Dificid completed this afternoon. No BM today. C/o bilateral leg pain - PRN Norco given. PRN Atarax given for anxiety. Skin care provided, sat up in chair for majority of the day, chair cushion in place. Hospice to meet with patient and son tonight per son's request. Patient is planned for discharge tomorrow at 1430 via stretcher van to [facility]. Patient stable and needs met at this time.

## 2024-02-09 NOTE — PLAN OF CARE
Goal Outcome Evaluation:  Plan of Care Reviewed With: patient        Progress: improving  Outcome Evaluation: Pt was seen for OT/PT co-tx this AM and in bed sleeping in fowlers with RLE slightly hanging from EOB and easily roused and agreeable to treatment. Pt SBA with bed mobility to sit EOB and wash face with s/u. PT MAX/DEP A to don non skid socks and limited by BLE pain, redness and edema with LLE more affected than RLE this AM. Pt MOD Ax2 STS from EOB with HHAx2 and took few steps over to chair with MOD Ax2 and cues for seq but noted improvement this AM from previous treatment sessions and increased act suzanne. Pt sat UIC for oral care with s/u and cues for sequencing and management of oral care items. Pt UIC with BLE elevated with pillows. Pt anticipates DC to [facility] this date but OT will cont to follow pt and progress as tolerated if DC plans should change.      Anticipated Discharge Disposition (OT): skilled nursing facility, extended care facility

## 2024-02-09 NOTE — PLAN OF CARE
Goal Outcome Evaluation:  Plan of Care Reviewed With: patient        Progress: improving  Outcome Evaluation: Skin care done after incontinent, cream applied per order on heel and coccyx. Betadine applied to toes. Heel off bed with pillows x2 underneath. Weight shifted and turned. All needs met. Call light within reach.

## 2024-02-10 VITALS
HEART RATE: 65 BPM | HEIGHT: 68 IN | TEMPERATURE: 98.2 F | RESPIRATION RATE: 18 BRPM | SYSTOLIC BLOOD PRESSURE: 145 MMHG | DIASTOLIC BLOOD PRESSURE: 64 MMHG | BODY MASS INDEX: 30.27 KG/M2 | WEIGHT: 199.74 LBS | OXYGEN SATURATION: 98 %

## 2024-02-10 LAB
ANION GAP SERPL CALCULATED.3IONS-SCNC: 12.3 MMOL/L (ref 5–15)
BASOPHILS # BLD AUTO: 0.14 10*3/MM3 (ref 0–0.2)
BASOPHILS NFR BLD AUTO: 1.6 % (ref 0–1.5)
BUN SERPL-MCNC: 24 MG/DL (ref 8–23)
BUN/CREAT SERPL: 18.3 (ref 7–25)
CALCIUM SPEC-SCNC: 8.2 MG/DL (ref 8.6–10.5)
CHLORIDE SERPL-SCNC: 106 MMOL/L (ref 98–107)
CO2 SERPL-SCNC: 21.7 MMOL/L (ref 22–29)
CREAT SERPL-MCNC: 1.31 MG/DL (ref 0.57–1)
DEPRECATED RDW RBC AUTO: 50.7 FL (ref 37–54)
EGFRCR SERPLBLD CKD-EPI 2021: 40.5 ML/MIN/1.73
EOSINOPHIL # BLD AUTO: 0.32 10*3/MM3 (ref 0–0.4)
EOSINOPHIL NFR BLD AUTO: 3.7 % (ref 0.3–6.2)
ERYTHROCYTE [DISTWIDTH] IN BLOOD BY AUTOMATED COUNT: 16.5 % (ref 12.3–15.4)
GLUCOSE BLDC GLUCOMTR-MCNC: 122 MG/DL (ref 70–130)
GLUCOSE BLDC GLUCOMTR-MCNC: 185 MG/DL (ref 70–130)
GLUCOSE SERPL-MCNC: 190 MG/DL (ref 65–99)
HCT VFR BLD AUTO: 30.3 % (ref 34–46.6)
HGB BLD-MCNC: 9.8 G/DL (ref 12–15.9)
IMM GRANULOCYTES # BLD AUTO: 0.04 10*3/MM3 (ref 0–0.05)
IMM GRANULOCYTES NFR BLD AUTO: 0.5 % (ref 0–0.5)
LYMPHOCYTES # BLD AUTO: 1.3 10*3/MM3 (ref 0.7–3.1)
LYMPHOCYTES NFR BLD AUTO: 14.9 % (ref 19.6–45.3)
MCH RBC QN AUTO: 27.8 PG (ref 26.6–33)
MCHC RBC AUTO-ENTMCNC: 32.3 G/DL (ref 31.5–35.7)
MCV RBC AUTO: 85.8 FL (ref 79–97)
MONOCYTES # BLD AUTO: 0.85 10*3/MM3 (ref 0.1–0.9)
MONOCYTES NFR BLD AUTO: 9.7 % (ref 5–12)
NEUTROPHILS NFR BLD AUTO: 6.09 10*3/MM3 (ref 1.7–7)
NEUTROPHILS NFR BLD AUTO: 69.6 % (ref 42.7–76)
NRBC BLD AUTO-RTO: 0 /100 WBC (ref 0–0.2)
PLATELET # BLD AUTO: 335 10*3/MM3 (ref 140–450)
PMV BLD AUTO: 9.3 FL (ref 6–12)
POTASSIUM SERPL-SCNC: 4.2 MMOL/L (ref 3.5–5.2)
RBC # BLD AUTO: 3.53 10*6/MM3 (ref 3.77–5.28)
SODIUM SERPL-SCNC: 140 MMOL/L (ref 136–145)
WBC NRBC COR # BLD AUTO: 8.74 10*3/MM3 (ref 3.4–10.8)

## 2024-02-10 PROCEDURE — 63710000001 INSULIN LISPRO (HUMAN) PER 5 UNITS: Performed by: STUDENT IN AN ORGANIZED HEALTH CARE EDUCATION/TRAINING PROGRAM

## 2024-02-10 PROCEDURE — 63710000001 INSULIN LISPRO (HUMAN) PER 5 UNITS: Performed by: INTERNAL MEDICINE

## 2024-02-10 PROCEDURE — 85025 COMPLETE CBC W/AUTO DIFF WBC: CPT | Performed by: STUDENT IN AN ORGANIZED HEALTH CARE EDUCATION/TRAINING PROGRAM

## 2024-02-10 PROCEDURE — 80048 BASIC METABOLIC PNL TOTAL CA: CPT | Performed by: STUDENT IN AN ORGANIZED HEALTH CARE EDUCATION/TRAINING PROGRAM

## 2024-02-10 PROCEDURE — 82948 REAGENT STRIP/BLOOD GLUCOSE: CPT

## 2024-02-10 RX ORDER — PREGABALIN 50 MG/1
50 CAPSULE ORAL DAILY
Qty: 3 CAPSULE | Refills: 0 | Status: SHIPPED | OUTPATIENT
Start: 2024-02-11

## 2024-02-10 RX ADMIN — INSULIN LISPRO 2 UNITS: 100 INJECTION, SOLUTION INTRAVENOUS; SUBCUTANEOUS at 09:39

## 2024-02-10 RX ADMIN — PREGABALIN 50 MG: 50 CAPSULE ORAL at 09:39

## 2024-02-10 RX ADMIN — Medication 1000 MCG: at 09:38

## 2024-02-10 RX ADMIN — Medication 1 TABLET: at 09:39

## 2024-02-10 RX ADMIN — ANORECTAL OINTMENT 1 APPLICATION: 15.7; .44; 24; 20.6 OINTMENT TOPICAL at 09:40

## 2024-02-10 RX ADMIN — FAMOTIDINE 20 MG: 20 TABLET, FILM COATED ORAL at 09:39

## 2024-02-10 RX ADMIN — ANORECTAL OINTMENT 1 APPLICATION: 15.7; .44; 24; 20.6 OINTMENT TOPICAL at 12:49

## 2024-02-10 RX ADMIN — CASTOR OIL AND BALSAM, PERU 1 APPLICATION: 788; 87 OINTMENT TOPICAL at 09:38

## 2024-02-10 RX ADMIN — FUROSEMIDE 20 MG: 20 TABLET ORAL at 09:38

## 2024-02-10 RX ADMIN — AMLODIPINE BESYLATE 10 MG: 10 TABLET ORAL at 09:38

## 2024-02-10 RX ADMIN — INSULIN LISPRO 13 UNITS: 100 INJECTION, SOLUTION INTRAVENOUS; SUBCUTANEOUS at 09:39

## 2024-02-10 RX ADMIN — BISOPROLOL FUMARATE 5 MG: 5 TABLET, FILM COATED ORAL at 09:38

## 2024-02-10 RX ADMIN — INSULIN LISPRO 13 UNITS: 100 INJECTION, SOLUTION INTRAVENOUS; SUBCUTANEOUS at 12:49

## 2024-02-10 RX ADMIN — HYDROXYZINE HYDROCHLORIDE 10 MG: 10 TABLET ORAL at 10:45

## 2024-02-10 RX ADMIN — POTASSIUM CHLORIDE 20 MEQ: 750 TABLET, EXTENDED RELEASE ORAL at 09:39

## 2024-02-10 RX ADMIN — LEVOTHYROXINE SODIUM 112 MCG: 112 TABLET ORAL at 09:38

## 2024-02-10 NOTE — PLAN OF CARE
Problem: Adult Inpatient Plan of Care  Goal: Plan of Care Review  Outcome: Met  Flowsheets (Taken 2/10/2024 1144)  Progress: improving  Plan of Care Reviewed With: patient  Outcome Evaluation: Vitals stable. Antibiotics for Cdiff complete. Pt discharged to SNF, transport at 1430 via stretcher van.

## 2024-02-10 NOTE — DISCHARGE SUMMARY
[facility]    Date of Discharge:  2/10/2024    PCP: Luis Felipe Flowers MD    Discharge Diagnosis:   Active Hospital Problems    Diagnosis  POA    **Sepsis [A41.9]  Yes    Stage 3a chronic kidney disease [N18.31]  Yes    Metabolic encephalopathy [G93.41]  Yes    Pressure injury of buttock, stage 2 [L89.302]  Yes    C. difficile diarrhea [A04.72]  Yes    Neuropathic pain [M79.2]  Yes    Acute UTI (urinary tract infection) [N39.0]  Yes    VIPUL (acute kidney injury) [N17.9]  Yes    Seizure [R56.9]  Yes    Hyperlipidemia [E78.5]  Yes    Type 2 diabetes mellitus with hyperglycemia, with long-term current use of insulin [E11.65, Z79.4]  Not Applicable    Benign essential hypertension [I10]  Yes      Resolved Hospital Problems   No resolved problems to display.          Consults       Date and Time Order Name Status Description    1/28/2024  2:02 PM Inpatient Infectious Diseases Consult Completed     1/28/2024  1:59 PM Inpatient Nephrology Consult      1/27/2024  5:50 PM [facility] (on-call MD unless specified) Details      1/10/2024  7:59 AM Inpatient Infectious Diseases Consult Completed     12/27/2023  3:37 PM Inpatient Infectious Diseases Consult Completed     12/26/2023  2:29 PM Inpatient Podiatry Consult Completed           Hospital Course  83 y.o. female     This lady was SICK when she came in.  Her white count was almost 30 she had a creatinine of 5 with acidosis and electrolyte abnormalities.  She was admitted for recurrent C. difficile colitis.  Her VIPUL was likely related to dehydration as she had loose stools for a few days prior before coming to the hospital.  She was persistently febrile for the first 48 hours and infectious disease did evaluate.  She was started on oral vancomycin, but she was eventually started on Dificid.  Patient was seen by infectious disease service.  She is on Dificid per ID, has to stay here to complete course, and this should finish Friday  02/09, then she can go to rehab.   She had been awaiting to finish the course of Dificid, she has been accepted at rehab facility today.  I have seen and examined patient at bedside, total time spent on discharge management is more than 30 minutes.  Plan of care was discussed with patient and she has verbalized understanding.    Temp:  [98.1 °F (36.7 °C)-98.6 °F (37 °C)] 98.4 °F (36.9 °C)  Heart Rate:  [60-72] 66  Resp:  [18] 18  BP: (125-164)/(55-65) 164/61  Body mass index is 30.37 kg/m².    Physical Exam  General, awake and alert.  Head and ENT, normocephalic and atraumatic.  Lungs, symmetric expansion, equal air entry bilaterally.  Heart, regular rate and rhythm.  Abdomen, soft and nontender.  Extremities, no clubbing or cyanosis.  Neuro, no focal deficits.  Skin: Warm and no rash.  Psych, normal mood and affect.  Musculoskeletal, joint examination is grossly normal.      Disposition: Skilled Nursing Facility (DC - External)       Discharge Medications        Changes to Medications        Instructions Start Date   atorvastatin 80 MG tablet  Commonly known as: LIPITOR  What changed: how to take this   TAKE 1 TABLET EVERY NIGHT      levothyroxine 112 MCG tablet  Commonly known as: SYNTHROID, LEVOTHROID  What changed: Another medication with the same name was changed. Make sure you understand how and when to take each.   1.5 tablets, Oral, Weekly, Take on Sunday      levothyroxine 112 MCG tablet  Commonly known as: SYNTHROID, LEVOTHROID  What changed:   how much to take  how to take this  when to take this  additional instructions   levothyroxine 112 mcg 1 tablet daily, except on Sunday take 1.5 tablets.      pregabalin 50 MG capsule  Commonly known as: LYRICA  What changed: when to take this   50 mg, Oral, Daily   Start Date: February 11, 2024            Continue These Medications        Instructions Start Date   amLODIPine 10 MG tablet  Commonly known as: NORVASC   10 mg, Oral, Daily      BD Pen Needle Naila 2nd  Gen 32G X 4 MM misc  Generic drug: Insulin Pen Needle   USE TO INJECT INSULIN FOUR TIMES DAILY      bisoprolol 5 MG tablet  Commonly known as: ZEBeta   5 mg, Oral, Daily      cadexomer iodine 0.9 % gel  Commonly known as: IODOSORB   1 application , Topical, Daily PRN      castor oil-balsam peru ointment   1 application , Topical, Daily      CertaVite Senior tablet tablet   1 tablet, Oral, Daily      cyanocobalamin 1000 MCG tablet  Commonly known as: VITAMIN B-12   1,000 mcg, Oral, Daily      dicyclomine 20 MG tablet  Commonly known as: BENTYL   1 tablet, Oral, 3 Times Daily PRN      DULoxetine 30 MG capsule  Commonly known as: CYMBALTA   30 mg, Oral, Daily      furosemide 20 MG tablet  Commonly known as: LASIX   20 mg, Oral, Daily      HYDROcodone-acetaminophen  MG per tablet  Commonly known as: NORCO   1 tablet, Oral, 3 Times Daily      hydrocortisone-zinc oxide-bacitracin-nystatin cream   1 application , Topical, 2 Times Daily PRN      Insulin Glargine (1 Unit Dial) 300 UNIT/ML solution pen-injector injection  Commonly known as: TOUJEO   25 Units, Subcutaneous, 2 Times Daily      insulin lispro 100 UNIT/ML injection  Commonly known as: HUMALOG/ADMELOG   2-7 Units, Subcutaneous, 4 Times Daily Before Meals & Nightly      insulin lispro 100 UNIT/ML injection  Commonly known as: HUMALOG/ADMELOG   5 Units, Subcutaneous, 3 Times Daily With Meals      lansoprazole 30 MG capsule  Commonly known as: PREVACID   30 mg, Oral, Daily      lisinopril 40 MG tablet  Commonly known as: PRINIVIL,ZESTRIL   40 mg, Oral, Daily      naloxone 4 MG/0.1ML nasal spray  Commonly known as: NARCAN   Call 911. Don't prime. [place] in 1 nostril for overdose. Repeat in 2-3 minutes in other nostril if no or minimal breathing/responsiveness.                Additional Instructions for the Follow-ups that You Need to Schedule       Discharge Follow-up with PCP   As directed       Currently Documented PCP:    Luis Felipe Flowers MD    PCP Phone  Number:    [phone]     Follow Up Details: Follow-up with PCP in 7 days.               Contact information for follow-up providers       Luis Felipe Flowers MD .    Specialty: Family Medicine  Why: Follow-up with PCP in 7 days.  Contact information:  [address]  [place] [address]  [phone]               Napoleon Mead MD .    Specialty: Family Medicine  Why: Follow-up with PCP in 7 days.  Contact information:  [address]  [address]  [place] [id number] [phone]                       Contact information for after-discharge care       Destination       [facility] .    Service: Skilled Nursing  Contact information:  [address] [facility] [address] [phone]                                No future appointments.     Eddie Vela MD  [facility]  02/10/24    Discharge time spent greater than 30 minutes.

## 2024-02-11 NOTE — CASE MANAGEMENT/SOCIAL WORK
Case Management Discharge Note      Final Note: [facility]         Selected Continued Care - Discharged on 2/10/2024 Admission date: 1/27/2024 - Discharge disposition: Skilled Nursing Facility (DC - External)      Destination Coordination complete.      Service Provider Selected Services Address Phone Fax Patient Preferred    [facility] [address] [place] [address] [phone] --              Durable Medical Equipment    No services have been selected for the patient.                Dialysis/Infusion    No services have been selected for the patient.                Home Medical Care    No services have been selected for the patient.                Therapy    No services have been selected for the patient.                Community Resources    No services have been selected for the patient.                Community & DME    No services have been selected for the patient.                    Selected Continued Care - Prior Encounters Includes continued care and service providers with selected services from prior encounters from 10/29/2023 to 2/10/2024      Discharged on 1/15/2024 Admission date: 1/9/2024 - Discharge disposition: Skilled Nursing Facility (DC - External)      Destination       Service Provider Selected Services Address Phone Fax Patient Preferred    [facility] [address] [address] [phone] --                      Discharged on 12/30/2023 Admission date: 12/24/2023 - Discharge disposition: Skilled Nursing Facility (DC - External)      Destination       Service Provider Selected Services Address Phone Fax Patient Preferred    [facility] [address] [address] [phone] --                               Final Discharge Disposition Code: 03 - skilled nursing facility (SNF)

## 2024-03-11 ENCOUNTER — TELEPHONE (OUTPATIENT)
Dept: ONCOLOGY | Facility: CLINIC | Age: 84
End: 2024-03-11
Payer: MEDICARE

## 2024-03-11 NOTE — TELEPHONE ENCOUNTER
Caller: Halie Guidry    Relationship to patient: Self    Best call back number: [phone]    Chief complaint:R/S    Type of visit:INJECTION, LAB & F/U 1    Requested date: ASAP     If rescheduling, when is the original appointment: 1/15/24     Additional notes:PT HAS BEEN HOSPITALIZED SINCE DECEMBER AND NEEDS TO GET BACK ON TRACK WITH HER APPTS.

## 2024-03-13 ENCOUNTER — TELEPHONE (OUTPATIENT)
Dept: ONCOLOGY | Facility: CLINIC | Age: 84
End: 2024-03-13
Payer: MEDICARE

## 2024-03-13 DIAGNOSIS — I10 ACCELERATED HYPERTENSION: Chronic | ICD-10-CM

## 2024-03-13 DIAGNOSIS — E78.5 HYPERLIPIDEMIA, UNSPECIFIED HYPERLIPIDEMIA TYPE: ICD-10-CM

## 2024-03-13 DIAGNOSIS — E11.65 TYPE 2 DIABETES MELLITUS WITH HYPERGLYCEMIA, WITH LONG-TERM CURRENT USE OF INSULIN: Primary | ICD-10-CM

## 2024-03-13 DIAGNOSIS — Z79.4 TYPE 2 DIABETES MELLITUS WITH HYPERGLYCEMIA, WITH LONG-TERM CURRENT USE OF INSULIN: Primary | ICD-10-CM

## 2024-03-13 DIAGNOSIS — E03.9 PRIMARY HYPOTHYROIDISM: ICD-10-CM

## 2024-03-13 RX ORDER — CHLORTHALIDONE 25 MG/1
TABLET ORAL
Qty: 30 TABLET | Refills: 11 | OUTPATIENT
Start: 2024-03-13

## 2024-03-13 RX ORDER — AMLODIPINE BESYLATE 5 MG/1
5 TABLET ORAL DAILY
Qty: 14 TABLET | Refills: 25 | OUTPATIENT
Start: 2024-03-13

## 2024-03-13 RX ORDER — LISINOPRIL 40 MG/1
40 TABLET ORAL DAILY
Qty: 30 TABLET | Refills: 11 | OUTPATIENT
Start: 2024-03-13

## 2024-03-13 RX ORDER — BISOPROLOL FUMARATE 5 MG/1
5 TABLET, FILM COATED ORAL DAILY
Qty: 30 TABLET | Refills: 11 | OUTPATIENT
Start: 2024-03-13

## 2024-03-13 NOTE — TELEPHONE ENCOUNTER
Caller: Halie Guidry    Relationship to patient: Self    Best call back number:     [phone]     Chief complaint: PT'S CALL IS BROKE AND THEY NEED TO MOVE HER APPT OUT BY A WEEK.     Type of visit: LAB, FOLLOW UP AND INFUSION.     Requested date: MOVE OUT BY A WEEK AND PT NEEDS A LATER    If rescheduling, when is the original appointment: 03/18/24    Additional notes: PLEASE ADVISE PT OF NEW APPT DATE/TIME

## 2024-03-14 RX ORDER — ATORVASTATIN CALCIUM 80 MG/1
80 TABLET, FILM COATED ORAL NIGHTLY
Qty: 30 TABLET | Refills: 2 | Status: SHIPPED | OUTPATIENT
Start: 2024-03-14

## 2024-03-22 RX ORDER — INSULIN LISPRO 100 [IU]/ML
2-7 INJECTION, SOLUTION INTRAVENOUS; SUBCUTANEOUS
Start: 2024-03-22

## 2024-03-22 NOTE — TELEPHONE ENCOUNTER
Caller: Alisia Guidrygopal LIMA    Relationship: Self    Best call back number: [phone]    Requested Prescriptions:   Requested Prescriptions     Pending Prescriptions Disp Refills    insulin lispro (HUMALOG/ADMELOG) 100 UNIT/ML injection       Sig: Inject 2-7 Units under the skin into the appropriate area as directed 4 (Four) Times a Day Before Meals & at Bedtime.        Pharmacy where request should be sent: [facility]    Last office visit with prescribing clinician: 8/24/2023   Last telemedicine visit with prescribing clinician: Visit date not found   Next office visit with prescribing clinician: Visit date not found     Additional details provided by patient: PATIENT WOULD LIKE TO KNOW IF SHE COULD HAVE SCRIPT FOR HUMALOG TO GO TO HER [facility] UNTIL SHE GO COULD GET HER MEDS DELIVERY BY [facility]. PLEASE ADVISE    Does the patient have less than a 3 day supply:  [x] Yes  [] No    Would you like a call back once the refill request has been completed: [] Yes [] No    If the office needs to give you a call back, can they leave a voicemail: [] Yes [] No    Jaylen Oliveira Rep   03/22/24 11:41 EDT

## 2024-03-23 NOTE — PROGRESS NOTES
.REASON FOR FOLLOW-UP:   pancreatic neuroendocrine tumor    History of Present Illness    This is a pleasant 83-year-old woman with type 2 diabetes, hypothyroidism, hypertension, stage IIIb CKD, anxiety disorder initially admitted on 7/21/2023 for generalized weakness and difficulty ambulating about her house.  She had a chest x-ray performed on 7/21/2023 which showed possible soft tissue in the right hilum and a follow-up CT chest was performed 7/24/2023 showing numerous mediastinal and right hilar lymph nodes predominantly calcified favored to represent prior granulomatous disease.  Visualized upper abdomen showed pneumobilia, calcified splenic granulomas, bilateral renal cortical lesions, intermediate density lesion upper pole of the right kidney 2 cm, small hyperdense lesion midpole of the kidney for which a CT pre and postcontrast renal protocol was recommended.  A CT abdomen/pelvis with and without contrast was performed on 7/26/23 which showed a slightly hyperdense slightly lobular partially exophytic nodule upper pole the right kidney with no postcontrast enhancement and a subcentimeter hyperdense nodule immediately stable in size.  There were no enhancing renal lesions.  Urinary bladder was thickened but nearly completely collapsed.  In the pancreatic head there was mild enhancement of a suspected mass 3.6 cm in diameter narrowing the common bile duct and a 2.8 x 2.2 cm lymph node or exophytic component inferiorly with mild dilation of the pancreatic duct.  The mass was suspicious for a neuroendocrine tumor radiographically.     The patient reports chronic diarrhea over the previous year or so but no weight loss or abdominal pain.  Blood sugars have been running elevated.  She denies facial flushing or hypoglycemic events.  Diarrhea has been worse over the past 2 to 3 weeks with multiple episodes of stool incontinence.    Laboratory studies showed a glucagon of 143, gastrin 236, somatostatin 21, CEA 3.09,  CA 19-9 44.3, chromogranin A 2296, proinsulin 6.3, insulin 7.3, pancreatic polypeptide 643.6.    After discharge the patient underwent an EGD on 8/4/2023 showing small hiatal hernia, erythema with erosions in the gastric antrum and body suggestive of erosive gastritis.  An endoscopic ultrasound was performed showing a pancreatic mass 3.5 x 3.0 cm in the pancreatic head and a second mass more circumscribed adjacently possibly communicating.  Fine-needle aspiration of the mass was performed showing a well-differentiated pancreatic neuroendocrine tumor    A dotatate scan on 9/13/2023 showed a pancreatic head mass 3.9 cm and adjacent positive peripancreatic lymph nodes consistent with metastatic disease, no visceral disease.    The patient was felt to be a poor candidate for surgery and initiated octreotide 20 mg on 9/13/2023.    Patient returns on 12/11/2023 for follow-up.  She reports his last visit when her octreotide was increased to 30 mg on 11/13/2023 her diarrhea has improved.  She is having regular bowel movements approximately every other day.  She reports her edema improved after the last visit at which time she reinitiated her Lasix 20 mg daily.  She has not taken any of her medications today and her blood pressure is elevated secondary to this.  She also did not eat so she is not feeling well.  We discussed she does not have to fast for her labs in office.    Interval History:  The patient is seen today for follow up.  She has not received octreotide since 12/11/2023 due to multiple hospitalizations.  She was first hospitalized from 12/24/2023 - 12/30/2023 for acute on chronic left foot diabetic foot ulcer.  She was seen by infectious disease and treated with antibiotics, then discharged home.    She was next admitted 1/9/2024 - 1/15/2024.  She had blood work performed at her nursing facility that showed elevated WBC and positive COVID-19 test along with positive C. difficile test.  She was evaluated by  infectious disease and started on remdesivir and oral vancomycin.  She did experience some intermittent hypoglycemia and her diabetic regimen was adjusted because of this.  She completed antibiotics and was discharged home.    She was again hospitalized from 1/27/2024 - 2/10/2024.  WBC was elevated at almost 30 and VIPUL with a creatinine of 5 with acidosis and electrolyte abnormalities.  She also had recurrent C. difficile.  Seen by infectious disease to started oral vancomycin which was [facility] to [facility].  She was also evaluated by nephrology.  She received IV fluids with improvement in VIPUL.  She completed her course of Dificid and was discharged to rehab facility.    Today, the patient is seen in a wheelchair with her son present.  Her son is currently moved in with her to help care for her, however she is having a difficult time performing all of her ADLs.  Her son works full-time and has a difficult time caring for his mother.  He admits he has had to start seeing a therapist because he is so stressed about trying to care for her.  She is incontinent of bowel, and refuses to wear briefs.  They are both distressed today about their current living situation and the difficulties in caring for her at home.  She has started having swelling in her lower extremities again, is off of her Lasix since hospital discharge.  She also is having diarrhea 1-2 episodes daily, incontinent.  Feels her appetite is doing okay.  Denies fever, chills, nausea, vomiting.    Past Medical History, Past Surgical History, Social History, Family History have been reviewed and are without significant changes except as mentioned.    Review of Systems   Constitutional:  Positive for activity change and fatigue. Negative for unexpected weight change.   HENT: Negative.     Respiratory: Negative.     Cardiovascular:  Positive for leg swelling.   Gastrointestinal:  Positive for diarrhea.   Genitourinary: Negative.    Musculoskeletal:  Positive  "for gait problem. Negative for myalgias.   Neurological:  Positive for weakness. Negative for light-headedness.   Hematological: Negative.    Psychiatric/Behavioral:  Positive for dysphoric mood. Negative for confusion.           Medications:  The current medication list was reviewed in the EMR    ALLERGIES:    Allergies   Allergen Reactions    Sulfa Antibiotics Unknown - High Severity     Pt says it was a long time ago and I don't remember but it wasn't good.        Objective      Vitals:    03/25/24 1408   BP: 111/64   Pulse: 61   Resp: 18   Temp: 97.8 °F (36.6 °C)   TempSrc: Temporal   SpO2: 96%   Weight: Comment: declined   Height: 172.7 cm (67.99\")   PainSc:   8   PainLoc: Leg  Comment: bilateral             3/25/2024     2:11 PM   Current Status   ECOG score 2       Physical Exam    CONSTITUTIONAL: well-developed adult woman  HEENT: no icterus, no thrush, moist membranes  LYMPH: no cervical or supraclavicular lad  CV: RRR, S1S2, no murmur  RESP: cta bilat, no wheezing, no rales  GI: soft, non-tender, no splenomegaly, +bs  MUSC: 1+ bilateral lower extremity edema symmetric, seated in a wheelchair  NEURO: alert and oriented x3, mild global weakness  PSYCH: Dysphoric mood    RECENT LABS:  Hematology WBC   Date Value Ref Range Status   03/25/2024 13.09 (H) 3.40 - 10.80 10*3/mm3 Final   01/18/2021 9.4 3.4 - 10.8 x10E3/uL Final     RBC   Date Value Ref Range Status   03/25/2024 4.01 3.77 - 5.28 10*6/mm3 Final   01/18/2021 4.59 3.77 - 5.28 x10E6/uL Final     Hemoglobin   Date Value Ref Range Status   03/25/2024 11.6 (L) 12.0 - 15.9 g/dL Final     Hematocrit   Date Value Ref Range Status   03/25/2024 33.8 (L) 34.0 - 46.6 % Final     Platelets   Date Value Ref Range Status   03/25/2024 302 140 - 450 10*3/mm3 Final     Lab Results   Component Value Date    GLUCOSE 190 (H) 02/10/2024    BUN 24 (H) 02/10/2024    CREATININE 1.31 (H) 02/10/2024    EGFRRESULT 42.3 (L) 06/02/2023    EGFR 40.5 (L) 02/10/2024    BCR 18.3 " 02/10/2024    K 4.2 02/10/2024    CO2 21.7 (L) 02/10/2024    CALCIUM 8.2 (L) 02/10/2024    PROTENTOTREF 7.2 06/02/2023    ALBUMIN 2.1 (L) 02/03/2024    BILITOT 0.3 01/31/2024    AST 19 01/31/2024    ALT 7 01/31/2024          CT ABD/PELVIS 7/26/23:  IMPRESSION:  1. There is a hyperenhancing mass at the pancreatic head measuring  approximately 3.6 cm and there is a 2.8 cm exophytic component or node  adjacently. The appearance is suspicious for a neuroendocrine tumor. The  mass significantly narrows the downstream portion of the common bile  duct, but there is no intrahepatic biliary dilatation and there is  pneumobilia. There is mild dilatation of the main pancreatic duct. For  tissue diagnosis, endoscopic ultrasound with FNA is recommended.  2. There are a few slightly hyperdense right renal nodules which likely  represent proteinaceous cysts. There are no enhancing or suspicious  renal lesions.       Assessment & Plan   *Well-differentiated pancreatic neuroendocrine tumor   Incidentally noted to have a hyperenhancing pancreatic head mass 3.6 cm with an adjacent 2.8 cm exophytic component or lymph node suspicious for NET radiographically by CT 7/26/2023  Laboratory studies showed a glucagon of 143, gastrin 236, somatostatin 21, CEA 3.09, CA 19-9 44.3, chromogranin A 2296, proinsulin 6.3, insulin 7.3, pancreatic polypeptide 643.6.  EGD on 8/4/2023 showing small hiatal hernia, erythema with erosions in the gastric antrum and body suggestive of erosive gastritis.  An endoscopic ultrasound was performed showing a pancreatic mass 3.5 x 3.0 cm in the pancreatic head and a second mass more circumscribed adjacently possibly communicating.  Fine-needle aspiration of the mass was performed showing a well-differentiated pancreatic neuroendocrine tumor  9/13/2023 dotatate scan-3.9 cm pancreatic head mass SUV 41.8 adjacent peripancreatic lymph node 2.1 cm SUV 38.4  9/13/2023-initiated octreotide 20 mg IM monthly; dose increased  to 30 mg monthly 11/13/2023 12/11/2023 reports her diarrhea is better controlled since the dose increase on octreotide last month.  Did not receive octreotide from 12/11/2023 - 3/25/2024 due to multiple hospitalizations.  3/25/2024-resume octreotide.  Having 1-2 episodes of diarrhea daily.       *Chronic diarrhea x1 year, hyperglycemia (but diabetic)     *Normocytic, normochromic anemia-hemoglobin 11.3 likely secondary to CKD 3   B12 572, folate 10.9, ferritin 32, iron sat 11%   Hemoglobin improved at 11.6.      *Comorbidities include venous insufficiency, hypertension, hyperlipidemia, CKD, hypothyroidism, advanced age-PS ECOG 2    *Weight gain/lower extremity edema after discontinuation of diuretics few months ago from hospitalization.  Improvement in weight gain and lower extremity edema since reinitiating Lasix 20 mg daily on 11/13/2023.       *Hospitalized December 2023 for diabetic foot ulcer    *2 separate hospitalizations January 2024 related to C. difficile colitis.  Patient experienced VIPUL related to dehydration, improved with IV fluids.    *Social-patient is living in her [facility].  Son recently moved in since she is unable to care for herself after multiple hospital admissions.  He reports she is unable to perform any of her ADLs.  He is working full-time and having a difficult time managing her care on top of work.  She is having diarrhea 1-2 times daily and is incontinent.  Refuses to wear briefs.  They are both distressed about their current situation.  Request , Savita Bergeron, come and discuss options with them.  Will place referral for home health with PT/OT/nursing care.  She is also going to look into private care options for them.    Oncology plan/recommendations:   Resume octreotide at 30 mg monthly IM  Continue high-dose PPI  Return in 1 month with Dr. Gutierrez repeat labs.  Order placed for home health PT/OT/nursing care per request of .    Review of recent  hospitalizations.     I spent 50 minutes caring for Halie on this date of service. This time includes time spent by me in the following activities: preparing for the visit, reviewing tests, obtaining and/or reviewing a separately obtained history, performing a medically appropriate examination and/or evaluation, counseling and educating the patient/family/caregiver, ordering medications, tests, or procedures, documenting information in the medical record, and care coordination.       3/25/2024      CC:

## 2024-03-25 ENCOUNTER — DOCUMENTATION (OUTPATIENT)
Dept: PSYCHIATRY | Facility: HOSPITAL | Age: 84
End: 2024-03-25
Payer: MEDICARE

## 2024-03-25 ENCOUNTER — OFFICE VISIT (OUTPATIENT)
Dept: ONCOLOGY | Facility: CLINIC | Age: 84
End: 2024-03-25
Payer: MEDICARE

## 2024-03-25 ENCOUNTER — LAB (OUTPATIENT)
Dept: LAB | Facility: HOSPITAL | Age: 84
End: 2024-03-25
Payer: MEDICARE

## 2024-03-25 ENCOUNTER — INFUSION (OUTPATIENT)
Dept: ONCOLOGY | Facility: HOSPITAL | Age: 84
End: 2024-03-25
Payer: MEDICARE

## 2024-03-25 VITALS
BODY MASS INDEX: 30.38 KG/M2 | HEART RATE: 61 BPM | HEIGHT: 68 IN | DIASTOLIC BLOOD PRESSURE: 64 MMHG | RESPIRATION RATE: 18 BRPM | TEMPERATURE: 97.8 F | OXYGEN SATURATION: 96 % | SYSTOLIC BLOOD PRESSURE: 111 MMHG

## 2024-03-25 DIAGNOSIS — C7A.8 PRIMARY MALIGNANT NEUROENDOCRINE TUMOR OF PANCREAS: Primary | ICD-10-CM

## 2024-03-25 DIAGNOSIS — C7A.8 PRIMARY MALIGNANT NEUROENDOCRINE TUMOR OF PANCREAS: ICD-10-CM

## 2024-03-25 DIAGNOSIS — D3A.8 PRIMARY PANCREATIC NEUROENDOCRINE TUMOR: Primary | ICD-10-CM

## 2024-03-25 DIAGNOSIS — Z79.899 ENCOUNTER FOR LONG-TERM (CURRENT) USE OF HIGH-RISK MEDICATION: ICD-10-CM

## 2024-03-25 DIAGNOSIS — R53.1 WEAKNESS: ICD-10-CM

## 2024-03-25 DIAGNOSIS — Z76.89 ENCOUNTER FOR SOCIAL WORK INTERVENTION: ICD-10-CM

## 2024-03-25 LAB
BASOPHILS # BLD AUTO: 0.13 10*3/MM3 (ref 0–0.2)
BASOPHILS NFR BLD AUTO: 1 % (ref 0–1.5)
DEPRECATED RDW RBC AUTO: 46.5 FL (ref 37–54)
EOSINOPHIL # BLD AUTO: 0.43 10*3/MM3 (ref 0–0.4)
EOSINOPHIL NFR BLD AUTO: 3.3 % (ref 0.3–6.2)
ERYTHROCYTE [DISTWIDTH] IN BLOOD BY AUTOMATED COUNT: 15.1 % (ref 12.3–15.4)
HCT VFR BLD AUTO: 33.8 % (ref 34–46.6)
HGB BLD-MCNC: 11.6 G/DL (ref 12–15.9)
IMM GRANULOCYTES # BLD AUTO: 0.06 10*3/MM3 (ref 0–0.05)
IMM GRANULOCYTES NFR BLD AUTO: 0.5 % (ref 0–0.5)
LYMPHOCYTES # BLD AUTO: 1.15 10*3/MM3 (ref 0.7–3.1)
LYMPHOCYTES NFR BLD AUTO: 8.8 % (ref 19.6–45.3)
MCH RBC QN AUTO: 28.9 PG (ref 26.6–33)
MCHC RBC AUTO-ENTMCNC: 34.3 G/DL (ref 31.5–35.7)
MCV RBC AUTO: 84.3 FL (ref 79–97)
MONOCYTES # BLD AUTO: 0.8 10*3/MM3 (ref 0.1–0.9)
MONOCYTES NFR BLD AUTO: 6.1 % (ref 5–12)
NEUTROPHILS NFR BLD AUTO: 10.52 10*3/MM3 (ref 1.7–7)
NEUTROPHILS NFR BLD AUTO: 80.3 % (ref 42.7–76)
NRBC BLD AUTO-RTO: 0 /100 WBC (ref 0–0.2)
PLATELET # BLD AUTO: 302 10*3/MM3 (ref 140–450)
PMV BLD AUTO: 10 FL (ref 6–12)
RBC # BLD AUTO: 4.01 10*6/MM3 (ref 3.77–5.28)
WBC NRBC COR # BLD AUTO: 13.09 10*3/MM3 (ref 3.4–10.8)

## 2024-03-25 PROCEDURE — 3078F DIAST BP <80 MM HG: CPT | Performed by: NURSE PRACTITIONER

## 2024-03-25 PROCEDURE — 85025 COMPLETE CBC W/AUTO DIFF WBC: CPT

## 2024-03-25 PROCEDURE — 1125F AMNT PAIN NOTED PAIN PRSNT: CPT | Performed by: NURSE PRACTITIONER

## 2024-03-25 PROCEDURE — 36415 COLL VENOUS BLD VENIPUNCTURE: CPT

## 2024-03-25 PROCEDURE — 1160F RVW MEDS BY RX/DR IN RCRD: CPT | Performed by: NURSE PRACTITIONER

## 2024-03-25 PROCEDURE — 96372 THER/PROPH/DIAG INJ SC/IM: CPT

## 2024-03-25 PROCEDURE — 25010000002 OCTREOTIDE PER 1 MG: Performed by: NURSE PRACTITIONER

## 2024-03-25 PROCEDURE — 99215 OFFICE O/P EST HI 40 MIN: CPT | Performed by: NURSE PRACTITIONER

## 2024-03-25 PROCEDURE — 3074F SYST BP LT 130 MM HG: CPT | Performed by: NURSE PRACTITIONER

## 2024-03-25 PROCEDURE — 1159F MED LIST DOCD IN RCRD: CPT | Performed by: NURSE PRACTITIONER

## 2024-03-25 RX ORDER — MINERAL OIL/HYDROPHIL PETROLAT
OINTMENT (GRAM) TOPICAL
COMMUNITY
Start: 2024-03-20

## 2024-03-25 RX ORDER — CERAMIDE 1,3,6-II/SALICYLIC/B3
CLEANSER (ML) TOPICAL
COMMUNITY
Start: 2024-03-20

## 2024-03-25 RX ADMIN — OCTREOTIDE ACETATE 30 MG: KIT at 15:53

## 2024-03-25 NOTE — PROGRESS NOTES
ONCOLOGY SOCIAL WORKER PSYCHOSOCIAL ASSESSMENT    Date:  3/25/2024      Reason for Visit:   Home Distress, home care needs and/or nursing home placement    Distress Screening Complete:  No (See assessment for details)     I.    PHYSICAL:     Diagnosis:  pancreatic neuroendocrine tumor    Date of Diagnosis:  2023   Recurrent of same type:  No   Treatment:      Other: octreotide at 30 mg monthly IM       II.   FINANCIAL:  Employment Status:  Retired  VA Benefits: No  Source of Income:  Social Security and shelter  Other:    Transportation Issues:  Yes   Means of Transportation:  son's car- would benefit from sezmi 3  Prescription Drug Coverage:  Yes  Medications Unable to Afford:  unknown     III.  SOCIAL:    Marital Status:    Significant Other:  No,   Children :  Yes - 2 adult sons  Do the children know about the cancer diagnosis:  Yes  Does the patient have:  Living Will / Advanced Directive - yes  Home Situation:  lives in a single story condo.  Her son Derrick lives with her. She is dependent on him for assistance with transfers and ADL's.  No steps to enter her condo. She has a wheelchair, BSC at home.  Uses a shower chair in walk in shower.     Patient's Support System:  her sons.  That is all.     ADLs - Functioning Level at Home:  Needs Assistance and Dependent  Able to (on own):  needs assistance with most ADL's   Current DME:  wheelchair, walker, BSC   Current Home Health:  none - going to get this started.   Dialysis:  No,   Any Drug Use History:  No  Any cultural/ethical background issues:  No      IV.    MENTAL:    Emotional Status:  Agitated, Angry, and Depressed    Mental Status:  Alert and Oriented  Any current psychiatric illness:  Yes, Anxiety and Depression   History of psychiatric illness:  Yes, Anxiety and Depression   Family history of psychiatric illness:  N/A, unknown   Current Psychiatrist:  none   Current Therapist:  none  Taking any psychiatric medications:  Yes,   Suicidal  Ideation:  Yes; Plan-No, Intent-No, and Means-No   Homicidal Ideation:  No;   Coping Skills / Strengths:  Her Isa   DEPRESSION SCALE SCORE (PHQ-9): 2       V.    SPIRITUAL:    Anabaptist Affiliation:  Church   Attend Services Regularly:  No  Any spiritual support:  need to explore      VII.  GOALS / NEEDS:    Goals:  Home Care vs Long term Care at a nursing home   Needs:  HH and in home caregivers, Referral to nursing homes that have medicaid beds   Referrals Made:  [facility] , [place] in home caregiving agency, Nursing Homes      VIII.  PATIENT CONCERNS:  Relationship stress with her son, being incontinent of bowel and bladder, being a burden, not able to be autonomous and independent.      IX.    NOTES:    Referral received from [facility] due to psychosocial distress and hostile family dynamics between patient and her son, Derrick.   Son is primary caregiver for his mom.  She has had several hospitalizations and SNF admissions.  Patient in need of more care in home or a transition to a nursing home with medicaid beds.  Patient did express some suicidal ideation while talking with her today. She feels demoralized and helpless due to her medical condition and current level of independence which is very, very minimal.  [place] Suicide screening was completed and patient scored a 0.  OSW provide active and reflective listening alongside problem solving to patient and son's current dilemma. Will cont to assist with needs/ goals.     Savita Pruitt LCSW  03/25/24  16:29 EDT

## 2024-03-27 ENCOUNTER — TELEPHONE (OUTPATIENT)
Dept: ONCOLOGY | Facility: CLINIC | Age: 84
End: 2024-03-27

## 2024-03-27 NOTE — TELEPHONE ENCOUNTER
Caller: NIRAJ    Relationship:     Best call back number: [phone]    What is the best time to reach you: ANY    Who are you requesting to speak with (clinical staff, provider,  specific staff member): GEETA    What was the call regarding: NIRAJ IS CALLING REGARDING HOME HEALTH REFERRAL  NEEDING TO DISCUSS FOLLOW UP PROVIDER      PLEASE ADVISE

## 2024-03-28 ENCOUNTER — HOME HEALTH ADMISSION (OUTPATIENT)
Dept: HOME HEALTH SERVICES | Facility: HOME HEALTHCARE | Age: 84
End: 2024-03-28
Payer: MEDICARE

## 2024-03-29 ENCOUNTER — TELEPHONE (OUTPATIENT)
Dept: PSYCHIATRY | Facility: HOSPITAL | Age: 84
End: 2024-03-29
Payer: MEDICARE

## 2024-03-29 NOTE — TELEPHONE ENCOUNTER
Oncology Social Work  Telephone Encounter    Spoke to patient's son.  [facility] in home caregiving agency went out to patient's house to assess for needs.  Patient agreeable to private pay caregiver through [employer].  The caregiver's name is Diane and she starts today.    Regarding Home Health order -  OSW called and made referrals to [facility] and [facility]  -  [facility] denied accepting patient. OSW then called [facility]  to see if they would be able to accept patient.  [facility]  does not have the staff.    Explained above to patients son.  Continued to talk with son about nursing home placement.     Will follow up in a couple weeks to see how the in home caregiving is going.     Savita Pruitt, MSSW, LCSW

## 2024-04-20 ENCOUNTER — HOSPITAL ENCOUNTER (INPATIENT)
Facility: HOSPITAL | Age: 84
LOS: 10 days | Discharge: NURSING, REHAB OR LONG-TERM CARE | End: 2024-05-01
Attending: EMERGENCY MEDICINE | Admitting: STUDENT IN AN ORGANIZED HEALTH CARE EDUCATION/TRAINING PROGRAM
Payer: MEDICARE

## 2024-04-20 DIAGNOSIS — N28.9 ACUTE RENAL INSUFFICIENCY: ICD-10-CM

## 2024-04-20 DIAGNOSIS — N17.9 ACUTE RENAL FAILURE, UNSPECIFIED ACUTE RENAL FAILURE TYPE: ICD-10-CM

## 2024-04-20 DIAGNOSIS — E10.65 UNCONTROLLED TYPE 1 DIABETES MELLITUS WITH HYPERGLYCEMIA: ICD-10-CM

## 2024-04-20 DIAGNOSIS — M79.2 NEUROPATHIC PAIN: Primary | ICD-10-CM

## 2024-04-20 PROBLEM — E11.65 UNCONTROLLED DIABETES MELLITUS WITH HYPERGLYCEMIA: Status: ACTIVE | Noted: 2024-04-20

## 2024-04-20 LAB
ALBUMIN SERPL-MCNC: 4.4 G/DL (ref 3.5–5.2)
ALBUMIN/GLOB SERPL: 1.1 G/DL
ALP SERPL-CCNC: 141 U/L (ref 39–117)
ALT SERPL W P-5'-P-CCNC: 10 U/L (ref 1–33)
ANION GAP SERPL CALCULATED.3IONS-SCNC: 15 MMOL/L (ref 5–15)
AST SERPL-CCNC: 7 U/L (ref 1–32)
BASOPHILS # BLD AUTO: 0.07 10*3/MM3 (ref 0–0.2)
BASOPHILS NFR BLD AUTO: 0.7 % (ref 0–1.5)
BILIRUB SERPL-MCNC: 0.6 MG/DL (ref 0–1.2)
BUN SERPL-MCNC: 46 MG/DL (ref 8–23)
BUN/CREAT SERPL: 18.4 (ref 7–25)
CALCIUM SPEC-SCNC: 9.2 MG/DL (ref 8.6–10.5)
CHLORIDE SERPL-SCNC: 84 MMOL/L (ref 98–107)
CO2 SERPL-SCNC: 24 MMOL/L (ref 22–29)
CREAT SERPL-MCNC: 2.5 MG/DL (ref 0.57–1)
DEPRECATED RDW RBC AUTO: 46.4 FL (ref 37–54)
EGFRCR SERPLBLD CKD-EPI 2021: 18.7 ML/MIN/1.73
EOSINOPHIL # BLD AUTO: 0.16 10*3/MM3 (ref 0–0.4)
EOSINOPHIL NFR BLD AUTO: 1.6 % (ref 0.3–6.2)
ERYTHROCYTE [DISTWIDTH] IN BLOOD BY AUTOMATED COUNT: 13.9 % (ref 12.3–15.4)
GLOBULIN UR ELPH-MCNC: 3.9 GM/DL
GLUCOSE SERPL-MCNC: 821 MG/DL (ref 65–99)
HCT VFR BLD AUTO: 43.7 % (ref 34–46.6)
HGB BLD-MCNC: 13.5 G/DL (ref 12–15.9)
IMM GRANULOCYTES # BLD AUTO: 0.04 10*3/MM3 (ref 0–0.05)
IMM GRANULOCYTES NFR BLD AUTO: 0.4 % (ref 0–0.5)
LYMPHOCYTES # BLD AUTO: 0.89 10*3/MM3 (ref 0.7–3.1)
LYMPHOCYTES NFR BLD AUTO: 9.1 % (ref 19.6–45.3)
MCH RBC QN AUTO: 28 PG (ref 26.6–33)
MCHC RBC AUTO-ENTMCNC: 30.9 G/DL (ref 31.5–35.7)
MCV RBC AUTO: 90.5 FL (ref 79–97)
MONOCYTES # BLD AUTO: 0.5 10*3/MM3 (ref 0.1–0.9)
MONOCYTES NFR BLD AUTO: 5.1 % (ref 5–12)
NEUTROPHILS NFR BLD AUTO: 8.09 10*3/MM3 (ref 1.7–7)
NEUTROPHILS NFR BLD AUTO: 83.1 % (ref 42.7–76)
NRBC BLD AUTO-RTO: 0 /100 WBC (ref 0–0.2)
PLATELET # BLD AUTO: 299 10*3/MM3 (ref 140–450)
PMV BLD AUTO: 10.3 FL (ref 6–12)
POTASSIUM SERPL-SCNC: 4.1 MMOL/L (ref 3.5–5.2)
PROCALCITONIN SERPL-MCNC: 0.1 NG/ML (ref 0–0.25)
PROT SERPL-MCNC: 8.3 G/DL (ref 6–8.5)
RBC # BLD AUTO: 4.83 10*6/MM3 (ref 3.77–5.28)
SODIUM SERPL-SCNC: 123 MMOL/L (ref 136–145)
WBC NRBC COR # BLD AUTO: 9.75 10*3/MM3 (ref 3.4–10.8)

## 2024-04-20 PROCEDURE — 99285 EMERGENCY DEPT VISIT HI MDM: CPT

## 2024-04-20 PROCEDURE — 25810000003 SODIUM CHLORIDE 0.9 % SOLUTION: Performed by: EMERGENCY MEDICINE

## 2024-04-20 PROCEDURE — 80053 COMPREHEN METABOLIC PANEL: CPT | Performed by: EMERGENCY MEDICINE

## 2024-04-20 PROCEDURE — 83036 HEMOGLOBIN GLYCOSYLATED A1C: CPT | Performed by: STUDENT IN AN ORGANIZED HEALTH CARE EDUCATION/TRAINING PROGRAM

## 2024-04-20 PROCEDURE — 84145 PROCALCITONIN (PCT): CPT | Performed by: EMERGENCY MEDICINE

## 2024-04-20 PROCEDURE — 85025 COMPLETE CBC W/AUTO DIFF WBC: CPT | Performed by: EMERGENCY MEDICINE

## 2024-04-20 PROCEDURE — 63710000001 INSULIN REGULAR HUMAN PER 5 UNITS: Performed by: EMERGENCY MEDICINE

## 2024-04-20 PROCEDURE — 63710000001 INSULIN GLARGINE PER 5 UNITS: Performed by: EMERGENCY MEDICINE

## 2024-04-20 PROCEDURE — 82948 REAGENT STRIP/BLOOD GLUCOSE: CPT

## 2024-04-20 PROCEDURE — G0378 HOSPITAL OBSERVATION PER HR: HCPCS

## 2024-04-20 RX ADMIN — INSULIN HUMAN 10 UNITS: 100 INJECTION, SOLUTION PARENTERAL at 22:46

## 2024-04-20 RX ADMIN — INSULIN GLARGINE 25 UNITS: 100 INJECTION, SOLUTION SUBCUTANEOUS at 22:47

## 2024-04-20 RX ADMIN — SODIUM CHLORIDE 1000 ML: 9 INJECTION, SOLUTION INTRAVENOUS at 20:51

## 2024-04-20 NOTE — ED PROVIDER NOTES
EMERGENCY DEPARTMENT ENCOUNTER  Room Number:  19/19  PCP: Napoleon Mead MD  Independent Historians: Patient, son at bedside      HPI:  Chief Complaint: had concerns including Altered Mental Status.     A complete HPI/ROS/PMH/PSH/SH/FH are unobtainable due to:   Chronic or social conditions impacting patient care (Social Determinants of Health):       Context: Halie Guidry is a 83 y.o. female with a medical history of diabetes, hypertension and hyperlipidemia who presents to the ED c/o acute weakness and confusion.  Symptoms have been ongoing over the last several days.  Patient reports that she has been out of her insulin for several weeks.  Unfortunately did not tell her son and he did not know.  She reports feeling bad all over and initially had frequent urination but has now had less urination.      Review of prior external notes (non-ED) -and- Review of prior external test results outside of this encounter:   I reviewed prior medical records including admission from January of this year.  Patient was admitted with severe sepsis from recurrent C. difficile.  She was in acute renal failure and ended up treated with oral vancomycin and then switched to Dificid.      Prescription drug monitoring program review:         PAST MEDICAL HISTORY  Active Ambulatory Problems     Diagnosis Date Noted    Compression fracture 04/22/2009    Lumbar degenerative disc disease 07/05/2012    Type 2 diabetes mellitus with hyperglycemia, with long-term current use of insulin     Hyperlipidemia     Benign essential hypertension 08/20/2014    Primary hypothyroidism     Neuropathy     Osteoporosis 09/09/2015    Proteinuria 02/28/2012    Tobacco abuse 08/22/2013    Diabetic eye exam 02/12/2014    Generalized weakness 05/27/2017    Spinal stenosis 05/27/2017    Scoliosis 05/27/2017    Arthritis 05/27/2017    VBI (vertebrobasilar insufficiency) 05/28/2017    Orthostatic hypotension 05/28/2017    Autonomic neuropathy due to  secondary diabetes mellitus 05/28/2017    Severe hypothyroidism 05/30/2017    Noncompliance with medication regimen 05/30/2017    Vitamin D deficiency disease 06/01/2017    Altered mental status 12/15/2017    Tremor 01/09/2018    Seizure 05/21/2019    Stage 3a chronic kidney disease 03/09/2012    Thoracic degenerative disc disease 01/27/2020    Metabolic encephalopathy 12/02/2021    VIPUL (acute kidney injury) 12/02/2021    Hyperglycemia 12/02/2021    Type II diabetes mellitus, uncontrolled 12/02/2021    Lower abdominal pain 02/23/2022    Transaminitis 02/23/2022    UTI (urinary tract infection) 02/23/2022    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    Choledocholithiasis 02/23/2022    Hypokalemia 02/24/2022    Hypoxia 02/24/2022    Generalized abdominal pain 03/26/2022    History of Clostridium difficile infection 03/26/2022    History of ERCP 03/26/2022    Acute UTI (urinary tract infection) 03/27/2022    Hypomagnesemia 03/28/2022    Burning pain 05/27/2022    Anxiety disorder 05/27/2022    Neuropathic pain 05/27/2022    Intertrigo 05/27/2022    Weakness of both lower extremities 06/24/2022    Accelerated hypertension 09/01/2022    Acute cystitis without hematuria 09/06/2022    Altered mental status, unspecified altered mental status type 11/04/2022    Left lower lobe pneumonia 11/04/2022    Acute pain of left foot 05/13/2023    Cellulitis and abscess of left lower extremity 05/15/2023    Hypertensive kidney disease with stage 3a chronic kidney disease 06/02/2023    Bilateral leg pain 07/21/2023    Peripheral edema 07/30/2023    Primary malignant neuroendocrine tumor of pancreas 08/24/2023    Encounter for long-term (current) use of high-risk medication 08/28/2023    Diabetic foot ulcer 12/24/2023    Acute renal insufficiency 01/09/2024    C. difficile diarrhea 01/15/2024    Sepsis 01/27/2024    Stage 3a chronic kidney disease 01/28/2024    Metabolic encephalopathy 01/28/2024    Pressure injury of buttock, stage 2  01/28/2024     Resolved Ambulatory Problems     Diagnosis Date Noted    Leukocytosis     COVID-19 virus detected 02/23/2022    Acute metabolic encephalopathy 06/22/2022    Hypoglycemia 06/23/2022    Acute metabolic encephalopathy 06/24/2022    Diarrhea 11/04/2022    COVID-19 01/10/2024    Cytokine release syndrome, grade 1 01/15/2024     Past Medical History:   Diagnosis Date    Anxiety     Bleeding disorder     Depression     Diabetes     Diabetes mellitus, type 2     Disc degeneration, lumbar     Headache, tension-type     Hypothyroidism     Peripheral neuropathy     Rotator cuff tear, left     Shoulder pain          PAST SURGICAL HISTORY  Past Surgical History:   Procedure Laterality Date    APPENDECTOMY      BILATERAL BREAST REDUCTION Bilateral 08/2015    CATARACT EXTRACTION  03/2015    CHOLECYSTECTOMY WITH INTRAOPERATIVE CHOLANGIOGRAM N/A 3/27/2022    Procedure: CHOLECYSTECTOMY LAPAROSCOPIC INTRAOPERATIVE CHOLANGIOGRAM;  Surgeon: Aiyana Hill MD;  Location: [facility] MAIN OR;  Service: General;  Laterality: N/A;    COLONOSCOPY  06/05/2015    WNL    ERCP N/A 2/25/2022    Procedure: ENDOSCOPIC RETROGRADE CHOLANGIOPANCREATOGRAPHY with sphincterotomy and balloon sweep;  Surgeon: Chilo Wilhelm MD;  Location: [facility] ENDOSCOPY;  Service: Gastroenterology;  Laterality: N/A;  PRE/POST - CBD stones    ERCP N/A 3/28/2022    Procedure: ENDOSCOPIC RETROGRADE CHOLANGIOPANCREATOGRAPHY WITH SPHINCTEROTOMY AND BALLOON SWEEP;  Surgeon: Chilo Wilhelm MD;  Location: [facility] ENDOSCOPY;  Service: Gastroenterology;  Laterality: N/A;  PRE: COMMON DUCT STONE  POST: COMMON DUCT STONE    KYPHOPLASTY      REDUCTION MAMMAPLASTY      TONSILLECTOMY           FAMILY HISTORY  Family History   Problem Relation Age of Onset    Bone cancer Mother     No Known Problems Father     Heart disease Daughter          SOCIAL HISTORY  Social History     Socioeconomic History    Marital status:     Number of children: 3   Tobacco Use     Smoking status: Former     Current packs/day: 0.00     Average packs/day: 1 pack/day for 40.0 years (40.0 ttl pk-yrs)     Types: Cigarettes     Start date:      Quit date:      Years since quittin.3    Smokeless tobacco: Never   Vaping Use    Vaping status: Never Used   Substance and Sexual Activity    Alcohol use: No    Drug use: No    Sexual activity: Defer         ALLERGIES  Sulfa antibiotics      REVIEW OF SYSTEMS  Review of Systems   Constitutional:  Positive for fatigue. Negative for fever.   Respiratory:  Negative for shortness of breath.    Cardiovascular:  Negative for chest pain.   Genitourinary:  Positive for frequency.   Neurological:  Positive for weakness.   All other systems reviewed and are negative.    Included in HPI  All systems reviewed and negative except for those discussed in HPI.      PHYSICAL EXAM    I have reviewed the triage vital signs and nursing notes.    ED Triage Vitals [24]   Temp Heart Rate Resp BP SpO2   96 °F (35.6 °C) 69 18 -- 98 %      Temp src Heart Rate Source Patient Position BP Location FiO2 (%)   Tympanic Monitor -- -- --       Physical Exam  GENERAL: Alert female in no obvious distress.  Triage vitals reviewed and are fairly unremarkable.  Temperature is 96.  Heart rate blood pressure and O2 sats are benign  SKIN: Warm, dry-there is a blister underneath the base of the right first MTP joint.  No significant warmth or erythema to suggest infection.  There is a chronic appearing blister in the medial portion of the left big toe.  HENT: Normocephalic, atraumatic  EYES: no scleral icterus  CV: regular rhythm, regular rate-no murmur  RESPIRATORY: normal effort, lungs clear  ABDOMEN: soft, nontender, nondistended  MUSCULOSKELETAL: no deformity  NEURO: alert, moves all extremities, follows commands      LAB RESULTS  Recent Results (from the past 24 hour(s))   Comprehensive Metabolic Panel    Collection Time: 24  8:50 PM    Specimen: Blood   Result  Value Ref Range    Glucose 821 (C) 65 - 99 mg/dL    BUN 46 (H) 8 - 23 mg/dL    Creatinine 2.50 (H) 0.57 - 1.00 mg/dL    Sodium 123 (L) 136 - 145 mmol/L    Potassium 4.1 3.5 - 5.2 mmol/L    Chloride 84 (L) 98 - 107 mmol/L    CO2 24.0 22.0 - 29.0 mmol/L    Calcium 9.2 8.6 - 10.5 mg/dL    Total Protein 8.3 6.0 - 8.5 g/dL    Albumin 4.4 3.5 - 5.2 g/dL    ALT (SGPT) 10 1 - 33 U/L    AST (SGOT) 7 1 - 32 U/L    Alkaline Phosphatase 141 (H) 39 - 117 U/L    Total Bilirubin 0.6 0.0 - 1.2 mg/dL    Globulin 3.9 gm/dL    A/G Ratio 1.1 g/dL    BUN/Creatinine Ratio 18.4 7.0 - 25.0    Anion Gap 15.0 5.0 - 15.0 mmol/L    eGFR 18.7 (L) >60.0 mL/min/1.73   Procalcitonin    Collection Time: 04/20/24  8:50 PM    Specimen: Blood   Result Value Ref Range    Procalcitonin 0.10 0.00 - 0.25 ng/mL   CBC Auto Differential    Collection Time: 04/20/24  8:50 PM    Specimen: Blood   Result Value Ref Range    WBC 9.75 3.40 - 10.80 10*3/mm3    RBC 4.83 3.77 - 5.28 10*6/mm3    Hemoglobin 13.5 12.0 - 15.9 g/dL    Hematocrit 43.7 34.0 - 46.6 %    MCV 90.5 79.0 - 97.0 fL    MCH 28.0 26.6 - 33.0 pg    MCHC 30.9 (L) 31.5 - 35.7 g/dL    RDW 13.9 12.3 - 15.4 %    RDW-SD 46.4 37.0 - 54.0 fl    MPV 10.3 6.0 - 12.0 fL    Platelets 299 140 - 450 10*3/mm3    Neutrophil % 83.1 (H) 42.7 - 76.0 %    Lymphocyte % 9.1 (L) 19.6 - 45.3 %    Monocyte % 5.1 5.0 - 12.0 %    Eosinophil % 1.6 0.3 - 6.2 %    Basophil % 0.7 0.0 - 1.5 %    Immature Grans % 0.4 0.0 - 0.5 %    Neutrophils, Absolute 8.09 (H) 1.70 - 7.00 10*3/mm3    Lymphocytes, Absolute 0.89 0.70 - 3.10 10*3/mm3    Monocytes, Absolute 0.50 0.10 - 0.90 10*3/mm3    Eosinophils, Absolute 0.16 0.00 - 0.40 10*3/mm3    Basophils, Absolute 0.07 0.00 - 0.20 10*3/mm3    Immature Grans, Absolute 0.04 0.00 - 0.05 10*3/mm3    nRBC 0.0 0.0 - 0.2 /100 WBC         RADIOLOGY  No Radiology Exams Resulted Within Past 24 Hours      MEDICATIONS GIVEN IN ER  Medications   sodium chloride 0.9 % bolus 1,000 mL (0 mL Intravenous  Stopped 4/20/24 2249)   insulin glargine (LANTUS, SEMGLEE) injection 25 Units (25 Units Subcutaneous Given 4/20/24 2247)   insulin regular (humuLIN R,novoLIN R) injection 10 Units (10 Units Subcutaneous Given 4/20/24 2246)         ORDERS PLACED DURING THIS VISIT:  Orders Placed This Encounter   Procedures    Comprehensive Metabolic Panel    Urinalysis With Microscopic If Indicated (No Culture) - Urine, Clean Catch    Procalcitonin    CBC Auto Differential    [facility] (on-call MD unless specified) Details    Initiate Observation Status    CBC & Differential         OUTPATIENT MEDICATION MANAGEMENT:  No current Epic-ordered facility-administered medications on file.     Current Outpatient Medications Ordered in Epic   Medication Sig Dispense Refill    amLODIPine (NORVASC) 10 MG tablet Take 1 tablet by mouth Daily.      atorvastatin (LIPITOR) 80 MG tablet Take 1 tablet by mouth Every Night. Indications: High Amount of Fats in the Blood 30 tablet 2    BD Pen Needle Naila 2nd Gen 32G X 4 MM misc USE TO INJECT INSULIN FOUR TIMES DAILY 200 each 0    bisoprolol (ZEBeta) 5 MG tablet Take 1 tablet by mouth Daily. 90 tablet 1    cadexomer iodine (IODOSORB) 0.9 % gel Apply 1 application  topically to the appropriate area as directed Daily As Needed for Wound Care.      castor oil-balsam peru (VENELEX) ointment Apply 1 application  topically to the appropriate area as directed Daily.      dicyclomine (BENTYL) 20 MG tablet Take 1 tablet by mouth 3 (Three) Times a Day As Needed for Abdominal Cramping.      DULoxetine (CYMBALTA) 30 MG capsule Take 1 capsule by mouth Daily. 90 capsule 1    Emollient (CeraVe Moisturizing) cream APPLY 1 APPLICATION ON THE SKIN TWICE A DAY      furosemide (LASIX) 20 MG tablet Take 1 tablet by mouth Daily. 30 tablet 3    HYDROcodone-acetaminophen (NORCO)  MG per tablet Take 1 tablet by mouth 3 (Three) Times a Day. 9 tablet 0    hydrocortisone-zinc oxide-bacitracin-nystatin cream Apply 1 application  topically to the appropriate area as directed 2 (Two) Times a Day As Needed (rash). 5 g 0    Insulin Glargine, 1 Unit Dial, (TOUJEO) 300 UNIT/ML solution pen-injector injection Inject 25 Units under the skin into the appropriate area as directed 2 (Two) Times a Day.      insulin lispro (HUMALOG/ADMELOG) 100 UNIT/ML injection Inject 2-7 Units under the skin into the appropriate area as directed 4 (Four) Times a Day Before Meals & at Bedtime.      insulin lispro (HUMALOG/ADMELOG) 100 UNIT/ML injection Inject 5 Units under the skin into the appropriate area as directed 3 (Three) Times a Day With Meals.      lansoprazole (PREVACID) 30 MG capsule TAKE 1 CAPSULE DAILY 30 capsule 11    levothyroxine (SYNTHROID, LEVOTHROID) 112 MCG tablet levothyroxine 112 mcg 1 tablet daily, except on Sunday take 1.5 tablets. (Patient taking differently: Take 1 tablet by mouth See Admin Instructions. Take everyday EXCEPT Sunday take 1.5 tab) 96 tablet 0    levothyroxine (SYNTHROID, LEVOTHROID) 112 MCG tablet Take 1.5 tablets by mouth 1 (One) Time Per Week. Take on Sunday      lisinopril (PRINIVIL,ZESTRIL) 40 MG tablet Take 1 tablet by mouth Daily. 90 tablet 1    mineral oil-hydrophilic petrolatum (AQUAPHOR) ointment APPLY 1 APPLICATION ON THE SKIN TWICE A DAY      multivitamin with minerals (CertaVite Senior) tablet tablet Take 1 tablet by mouth Daily.      naloxone (NARCAN) 4 MG/0.1ML nasal spray Call 911. Don't prime. [place] in 1 nostril for overdose. Repeat in 2-3 minutes in other nostril if no or minimal breathing/responsiveness. 2 each 0    pregabalin (LYRICA) 50 MG capsule Take 1 capsule by mouth Daily. 3 capsule 0    vitamin B-12 (VITAMIN B-12) 1000 MCG tablet Take 1 tablet by mouth Daily.           PROCEDURES  Procedures            PROGRESS, DATA ANALYSIS, CONSULTS, AND MEDICAL DECISION MAKING  All labs have been independently interpreted by me.  All radiology studies have been reviewed by me. All EKG's have been independently  viewed and interpreted by me.  Discussion below represents my analysis of pertinent findings related to patient's condition, differential diagnosis, treatment plan and final disposition.    Differential diagnosis includes but is not limited to diabetes out-of-control, DKA, dehydration, electrolyte disturbance, underlying infection.      ED Course as of 04/20/24 2332   Sat Apr 20, 2024   2205 Labs notable for glucose of 821.  BUN and creatinine are elevated at 46 and 2.5.  This is diabetes out-of-control with significant hyperglycemia.  She has not acidotic and is not in DKA.  Will need to admit for insulin and IV fluids.    Note that sodium is low at 123 but this is pseudohypoglycemia related to elevated glucose. [DB]   2228 I discussed management of this patient with Dr. Savita Gomez.    Will go ahead and give patient some long-acting insulin, 25 units of Lantus as well as 10 units of regular insulin to get her sugar under better control.  Will continue to give some IV fluids and admit to a telemetry bed.  Will need to recheck potassium to make sure she does not drop with the insulin therapy. [DB]      ED Course User Index  [DB] Franco Lazaro MD             AS OF 23:32 EDT VITALS:    BP - 169/72  HR - 62  TEMP - 96 °F (35.6 °C) (Tympanic)  O2 SATS - 99%    COMPLEXITY OF CARE  Complicated patient with history of diabetes, hypertension hyperlipidemia presents with weakness and confusion per her son.  Patient has been out of insulin for several weeks.    On exam she is fairly intact mental status.  She did look quite dry.    Please see my interpretation of ED testing above including lab work which was notable for elevated glucose of 821.  She was also quite dehydrated and acute renal failure with a BUN of 46 and creatinine of 2.5 (elevated from baseline of 1.3).    Patient treated in the ED with both long-acting and short acting insulin.  She was also given IV fluids.  Plan admission for control of glucose and  electrolytes.      DIAGNOSIS  Final diagnoses:   Acute renal failure, unspecified acute renal failure type   Uncontrolled type 1 diabetes mellitus with hyperglycemia         DISPOSITION  ED Disposition       ED Disposition   Decision to Admit    Condition   --    Comment   Level of Care: Telemetry [5]   Diagnosis: Uncontrolled diabetes mellitus with hyperglycemia [[id number]]   Admitting Physician: LILIANE ESTRELLA [[id number]]   Attending Physician: LILIANE ESTRELLA [[id number]]                  Please note that portions of this document were completed with a voice recognition program.    Note Disclaimer: At [facility], we believe that sharing information builds trust and better relationships. You are receiving this note because you recently visited [facility]. It is possible you will see health information before a provider has talked with you about it. This kind of information can be easy to misunderstand. To help you fully understand what it means for your health, we urge you to discuss this note with your provider.         Franco Lazaro MD  04/20/24 2859       Franco Lazaro MD  04/20/24 3679

## 2024-04-21 PROBLEM — I10 HTN (HYPERTENSION): Status: ACTIVE | Noted: 2024-04-21

## 2024-04-21 PROBLEM — R79.89 PSEUDOHYPONATREMIA: Status: ACTIVE | Noted: 2024-04-21

## 2024-04-21 LAB
ALBUMIN SERPL-MCNC: 3.5 G/DL (ref 3.5–5.2)
ALBUMIN/GLOB SERPL: 0.9 G/DL
ALP SERPL-CCNC: 117 U/L (ref 39–117)
ALT SERPL W P-5'-P-CCNC: 8 U/L (ref 1–33)
ANION GAP SERPL CALCULATED.3IONS-SCNC: 13 MMOL/L (ref 5–15)
AST SERPL-CCNC: 9 U/L (ref 1–32)
BILIRUB SERPL-MCNC: 0.4 MG/DL (ref 0–1.2)
BUN SERPL-MCNC: 41 MG/DL (ref 8–23)
BUN/CREAT SERPL: 17.4 (ref 7–25)
CALCIUM SPEC-SCNC: 9.1 MG/DL (ref 8.6–10.5)
CHLORIDE SERPL-SCNC: 94 MMOL/L (ref 98–107)
CHLORIDE UR-SCNC: 75 MMOL/L
CO2 SERPL-SCNC: 24 MMOL/L (ref 22–29)
CREAT SERPL-MCNC: 2.36 MG/DL (ref 0.57–1)
CREAT UR-MCNC: 45.1 MG/DL
EGFRCR SERPLBLD CKD-EPI 2021: 20 ML/MIN/1.73
GLOBULIN UR ELPH-MCNC: 3.8 GM/DL
GLUCOSE BLDC GLUCOMTR-MCNC: 200 MG/DL (ref 70–130)
GLUCOSE BLDC GLUCOMTR-MCNC: 234 MG/DL (ref 70–130)
GLUCOSE BLDC GLUCOMTR-MCNC: 312 MG/DL (ref 70–130)
GLUCOSE BLDC GLUCOMTR-MCNC: 332 MG/DL (ref 70–130)
GLUCOSE BLDC GLUCOMTR-MCNC: 333 MG/DL (ref 70–130)
GLUCOSE BLDC GLUCOMTR-MCNC: 494 MG/DL (ref 70–130)
GLUCOSE SERPL-MCNC: 261 MG/DL (ref 65–99)
GLUCOSE SERPL-MCNC: 555 MG/DL (ref 65–99)
HBA1C MFR BLD: 14.2 % (ref 4.8–5.6)
POTASSIUM SERPL-SCNC: 3.3 MMOL/L (ref 3.5–5.2)
PROT ?TM UR-MCNC: 76.4 MG/DL
PROT SERPL-MCNC: 7.3 G/DL (ref 6–8.5)
PROT/CREAT UR: 1694 MG/G CREA (ref 0–200)
SODIUM SERPL-SCNC: 131 MMOL/L (ref 136–145)
SODIUM UR-SCNC: 85 MMOL/L

## 2024-04-21 PROCEDURE — 82436 ASSAY OF URINE CHLORIDE: CPT | Performed by: INTERNAL MEDICINE

## 2024-04-21 PROCEDURE — 82947 ASSAY GLUCOSE BLOOD QUANT: CPT | Performed by: STUDENT IN AN ORGANIZED HEALTH CARE EDUCATION/TRAINING PROGRAM

## 2024-04-21 PROCEDURE — 97162 PT EVAL MOD COMPLEX 30 MIN: CPT

## 2024-04-21 PROCEDURE — 82948 REAGENT STRIP/BLOOD GLUCOSE: CPT

## 2024-04-21 PROCEDURE — 63710000001 INSULIN GLARGINE PER 5 UNITS: Performed by: STUDENT IN AN ORGANIZED HEALTH CARE EDUCATION/TRAINING PROGRAM

## 2024-04-21 PROCEDURE — 25810000003 LACTATED RINGERS PER 1000 ML: Performed by: STUDENT IN AN ORGANIZED HEALTH CARE EDUCATION/TRAINING PROGRAM

## 2024-04-21 PROCEDURE — 82570 ASSAY OF URINE CREATININE: CPT | Performed by: INTERNAL MEDICINE

## 2024-04-21 PROCEDURE — 63710000001 INSULIN LISPRO (HUMAN) PER 5 UNITS: Performed by: STUDENT IN AN ORGANIZED HEALTH CARE EDUCATION/TRAINING PROGRAM

## 2024-04-21 PROCEDURE — 97530 THERAPEUTIC ACTIVITIES: CPT

## 2024-04-21 PROCEDURE — 81001 URINALYSIS AUTO W/SCOPE: CPT | Performed by: EMERGENCY MEDICINE

## 2024-04-21 PROCEDURE — 84156 ASSAY OF PROTEIN URINE: CPT | Performed by: INTERNAL MEDICINE

## 2024-04-21 PROCEDURE — 87086 URINE CULTURE/COLONY COUNT: CPT | Performed by: HOSPITALIST

## 2024-04-21 PROCEDURE — 84300 ASSAY OF URINE SODIUM: CPT | Performed by: INTERNAL MEDICINE

## 2024-04-21 PROCEDURE — 63710000001 INSULIN LISPRO (HUMAN) PER 5 UNITS: Performed by: NURSE PRACTITIONER

## 2024-04-21 PROCEDURE — 80053 COMPREHEN METABOLIC PANEL: CPT | Performed by: HOSPITALIST

## 2024-04-21 RX ORDER — BISACODYL 5 MG/1
5 TABLET, DELAYED RELEASE ORAL DAILY PRN
Status: DISCONTINUED | OUTPATIENT
Start: 2024-04-21 | End: 2024-04-25

## 2024-04-21 RX ORDER — HYDROCODONE BITARTRATE AND ACETAMINOPHEN 10; 325 MG/1; MG/1
1 TABLET ORAL 3 TIMES DAILY
Status: DISCONTINUED | OUTPATIENT
Start: 2024-04-21 | End: 2024-05-01 | Stop reason: HOSPADM

## 2024-04-21 RX ORDER — LEVOTHYROXINE SODIUM 112 UG/1
168 TABLET ORAL
Status: DISCONTINUED | OUTPATIENT
Start: 2024-04-21 | End: 2024-05-01 | Stop reason: HOSPADM

## 2024-04-21 RX ORDER — DEXTROSE MONOHYDRATE 25 G/50ML
25 INJECTION, SOLUTION INTRAVENOUS
Status: DISCONTINUED | OUTPATIENT
Start: 2024-04-21 | End: 2024-05-01 | Stop reason: HOSPADM

## 2024-04-21 RX ORDER — SODIUM CHLORIDE 9 MG/ML
40 INJECTION, SOLUTION INTRAVENOUS AS NEEDED
Status: DISCONTINUED | OUTPATIENT
Start: 2024-04-21 | End: 2024-05-01 | Stop reason: HOSPADM

## 2024-04-21 RX ORDER — NICOTINE POLACRILEX 4 MG
15 LOZENGE BUCCAL
Status: DISCONTINUED | OUTPATIENT
Start: 2024-04-21 | End: 2024-05-01 | Stop reason: HOSPADM

## 2024-04-21 RX ORDER — CHOLECALCIFEROL (VITAMIN D3) 125 MCG
1000 CAPSULE ORAL DAILY
Status: DISCONTINUED | OUTPATIENT
Start: 2024-04-21 | End: 2024-05-01 | Stop reason: HOSPADM

## 2024-04-21 RX ORDER — SODIUM CHLORIDE 0.9 % (FLUSH) 0.9 %
10 SYRINGE (ML) INJECTION AS NEEDED
Status: DISCONTINUED | OUTPATIENT
Start: 2024-04-21 | End: 2024-05-01 | Stop reason: HOSPADM

## 2024-04-21 RX ORDER — POTASSIUM CHLORIDE 750 MG/1
10 TABLET, FILM COATED, EXTENDED RELEASE ORAL DAILY
Status: DISCONTINUED | OUTPATIENT
Start: 2024-04-21 | End: 2024-04-21

## 2024-04-21 RX ORDER — SODIUM CHLORIDE, SODIUM LACTATE, POTASSIUM CHLORIDE, CALCIUM CHLORIDE 600; 310; 30; 20 MG/100ML; MG/100ML; MG/100ML; MG/100ML
100 INJECTION, SOLUTION INTRAVENOUS CONTINUOUS
Status: DISCONTINUED | OUTPATIENT
Start: 2024-04-21 | End: 2024-04-22

## 2024-04-21 RX ORDER — PANTOPRAZOLE SODIUM 40 MG/1
40 TABLET, DELAYED RELEASE ORAL
Status: DISCONTINUED | OUTPATIENT
Start: 2024-04-21 | End: 2024-05-01 | Stop reason: HOSPADM

## 2024-04-21 RX ORDER — INSULIN LISPRO 100 [IU]/ML
2-7 INJECTION, SOLUTION INTRAVENOUS; SUBCUTANEOUS
Status: DISCONTINUED | OUTPATIENT
Start: 2024-04-21 | End: 2024-05-01 | Stop reason: HOSPADM

## 2024-04-21 RX ORDER — PREGABALIN 50 MG/1
50 CAPSULE ORAL DAILY
Status: DISCONTINUED | OUTPATIENT
Start: 2024-04-21 | End: 2024-04-23

## 2024-04-21 RX ORDER — LEVOTHYROXINE SODIUM 112 UG/1
112 TABLET ORAL
Status: DISCONTINUED | OUTPATIENT
Start: 2024-04-22 | End: 2024-05-01 | Stop reason: HOSPADM

## 2024-04-21 RX ORDER — ATORVASTATIN CALCIUM 80 MG/1
80 TABLET, FILM COATED ORAL NIGHTLY
Status: DISCONTINUED | OUTPATIENT
Start: 2024-04-21 | End: 2024-05-01 | Stop reason: HOSPADM

## 2024-04-21 RX ORDER — INSULIN LISPRO 100 [IU]/ML
7 INJECTION, SOLUTION INTRAVENOUS; SUBCUTANEOUS ONCE
Status: COMPLETED | OUTPATIENT
Start: 2024-04-21 | End: 2024-04-21

## 2024-04-21 RX ORDER — SODIUM CHLORIDE 0.9 % (FLUSH) 0.9 %
10 SYRINGE (ML) INJECTION EVERY 12 HOURS SCHEDULED
Status: DISCONTINUED | OUTPATIENT
Start: 2024-04-21 | End: 2024-05-01 | Stop reason: HOSPADM

## 2024-04-21 RX ORDER — POLYETHYLENE GLYCOL 3350 17 G/17G
17 POWDER, FOR SOLUTION ORAL DAILY PRN
Status: DISCONTINUED | OUTPATIENT
Start: 2024-04-21 | End: 2024-04-25

## 2024-04-21 RX ORDER — AMLODIPINE BESYLATE 10 MG/1
10 TABLET ORAL DAILY
Status: DISCONTINUED | OUTPATIENT
Start: 2024-04-21 | End: 2024-04-27

## 2024-04-21 RX ORDER — POTASSIUM CHLORIDE 750 MG/1
40 TABLET, FILM COATED, EXTENDED RELEASE ORAL ONCE
Status: COMPLETED | OUTPATIENT
Start: 2024-04-21 | End: 2024-04-21

## 2024-04-21 RX ORDER — BISACODYL 10 MG
10 SUPPOSITORY, RECTAL RECTAL DAILY PRN
Status: DISCONTINUED | OUTPATIENT
Start: 2024-04-21 | End: 2024-04-25

## 2024-04-21 RX ORDER — AMOXICILLIN 250 MG
2 CAPSULE ORAL 2 TIMES DAILY PRN
Status: DISCONTINUED | OUTPATIENT
Start: 2024-04-21 | End: 2024-04-25

## 2024-04-21 RX ORDER — INSULIN LISPRO 100 [IU]/ML
5 INJECTION, SOLUTION INTRAVENOUS; SUBCUTANEOUS
Status: DISCONTINUED | OUTPATIENT
Start: 2024-04-21 | End: 2024-04-22

## 2024-04-21 RX ORDER — IBUPROFEN 600 MG/1
1 TABLET ORAL
Status: DISCONTINUED | OUTPATIENT
Start: 2024-04-21 | End: 2024-05-01 | Stop reason: HOSPADM

## 2024-04-21 RX ORDER — BISOPROLOL FUMARATE 5 MG/1
5 TABLET, FILM COATED ORAL DAILY
Status: DISCONTINUED | OUTPATIENT
Start: 2024-04-21 | End: 2024-05-01 | Stop reason: HOSPADM

## 2024-04-21 RX ORDER — ONDANSETRON 2 MG/ML
4 INJECTION INTRAMUSCULAR; INTRAVENOUS EVERY 6 HOURS PRN
Status: DISCONTINUED | OUTPATIENT
Start: 2024-04-21 | End: 2024-05-01 | Stop reason: HOSPADM

## 2024-04-21 RX ORDER — DULOXETIN HYDROCHLORIDE 30 MG/1
30 CAPSULE, DELAYED RELEASE ORAL DAILY
Status: DISCONTINUED | OUTPATIENT
Start: 2024-04-21 | End: 2024-05-01 | Stop reason: HOSPADM

## 2024-04-21 RX ADMIN — SODIUM CHLORIDE, POTASSIUM CHLORIDE, SODIUM LACTATE AND CALCIUM CHLORIDE 100 ML/HR: 600; 310; 30; 20 INJECTION, SOLUTION INTRAVENOUS at 11:46

## 2024-04-21 RX ADMIN — AMLODIPINE BESYLATE 10 MG: 10 TABLET ORAL at 08:14

## 2024-04-21 RX ADMIN — BISOPROLOL FUMARATE 5 MG: 5 TABLET, FILM COATED ORAL at 08:14

## 2024-04-21 RX ADMIN — INSULIN LISPRO 5 UNITS: 100 INJECTION, SOLUTION INTRAVENOUS; SUBCUTANEOUS at 08:13

## 2024-04-21 RX ADMIN — Medication 10 ML: at 20:44

## 2024-04-21 RX ADMIN — INSULIN GLARGINE 25 UNITS: 100 INJECTION, SOLUTION SUBCUTANEOUS at 20:44

## 2024-04-21 RX ADMIN — DULOXETINE HYDROCHLORIDE 30 MG: 30 CAPSULE, DELAYED RELEASE ORAL at 08:14

## 2024-04-21 RX ADMIN — ATORVASTATIN CALCIUM 80 MG: 80 TABLET, FILM COATED ORAL at 20:46

## 2024-04-21 RX ADMIN — Medication 10 ML: at 08:14

## 2024-04-21 RX ADMIN — HYDROCODONE BITARTRATE AND ACETAMINOPHEN 1 TABLET: 10; 325 TABLET ORAL at 08:14

## 2024-04-21 RX ADMIN — POTASSIUM CHLORIDE 10 MEQ: 750 TABLET, EXTENDED RELEASE ORAL at 16:42

## 2024-04-21 RX ADMIN — INSULIN LISPRO 3 UNITS: 100 INJECTION, SOLUTION INTRAVENOUS; SUBCUTANEOUS at 16:43

## 2024-04-21 RX ADMIN — SODIUM CHLORIDE, POTASSIUM CHLORIDE, SODIUM LACTATE AND CALCIUM CHLORIDE 100 ML/HR: 600; 310; 30; 20 INJECTION, SOLUTION INTRAVENOUS at 01:35

## 2024-04-21 RX ADMIN — POTASSIUM CHLORIDE 40 MEQ: 750 TABLET, EXTENDED RELEASE ORAL at 17:55

## 2024-04-21 RX ADMIN — INSULIN LISPRO 5 UNITS: 100 INJECTION, SOLUTION INTRAVENOUS; SUBCUTANEOUS at 20:44

## 2024-04-21 RX ADMIN — Medication 1000 MCG: at 08:14

## 2024-04-21 RX ADMIN — HYDROCODONE BITARTRATE AND ACETAMINOPHEN 1 TABLET: 10; 325 TABLET ORAL at 15:11

## 2024-04-21 RX ADMIN — INSULIN LISPRO 5 UNITS: 100 INJECTION, SOLUTION INTRAVENOUS; SUBCUTANEOUS at 11:46

## 2024-04-21 RX ADMIN — INSULIN GLARGINE 25 UNITS: 100 INJECTION, SOLUTION SUBCUTANEOUS at 08:13

## 2024-04-21 RX ADMIN — HYDROCODONE BITARTRATE AND ACETAMINOPHEN 1 TABLET: 10; 325 TABLET ORAL at 20:43

## 2024-04-21 RX ADMIN — PANTOPRAZOLE SODIUM 40 MG: 40 TABLET, DELAYED RELEASE ORAL at 06:50

## 2024-04-21 RX ADMIN — Medication 10 ML: at 01:35

## 2024-04-21 RX ADMIN — INSULIN LISPRO 7 UNITS: 100 INJECTION, SOLUTION INTRAVENOUS; SUBCUTANEOUS at 03:46

## 2024-04-21 RX ADMIN — PREGABALIN 50 MG: 50 CAPSULE ORAL at 08:14

## 2024-04-21 RX ADMIN — INSULIN LISPRO 5 UNITS: 100 INJECTION, SOLUTION INTRAVENOUS; SUBCUTANEOUS at 16:42

## 2024-04-21 RX ADMIN — LEVOTHYROXINE SODIUM 168 MCG: 112 TABLET ORAL at 08:14

## 2024-04-21 NOTE — PROGRESS NOTES
[facility]     LOS: 0 days     Subjective:    CC:Altered Mental Status    DIET:  Diet Order   Procedures    Diet: Diabetic; Consistent Carbohydrate; Fluid Consistency: Thin (IDDSI 0)     Eating some  No vomiting    Objective:    Vital Signs:  Temp:  [96 °F (35.6 °C)-98.1 °F (36.7 °C)] 98.1 °F (36.7 °C)  Heart Rate:  [56-69] 58  Resp:  [16-18] 16  BP: (106-211)/(56-80) 106/56    SpO2:  [97 %-100 %] 97 %  on   ;   Device (Oxygen Therapy): room air  Body mass index is 29.04 kg/m².    Physical Exam  Constitutional:       Appearance: Normal appearance.   HENT:      Head: Normocephalic and atraumatic.   Cardiovascular:      Rate and Rhythm: Normal rate and regular rhythm.      Heart sounds: No murmur heard.     No friction rub.   Pulmonary:      Effort: Pulmonary effort is normal.      Breath sounds: Normal breath sounds.   Abdominal:      General: Bowel sounds are normal. There is no distension.      Palpations: Abdomen is soft.      Tenderness: There is no abdominal tenderness.   Skin:     General: Skin is warm and dry.      Comments: Skin thickening of shins   Neurological:      Mental Status: She is alert.   Psychiatric:         Mood and Affect: Mood normal.         Behavior: Behavior normal.         Results Review:    Glucose   Date Value Ref Range Status   04/21/2024 555 (C) 65 - 99 mg/dL Final   04/20/2024 821 (C) 65 - 99 mg/dL Final     Results from last 7 days   Lab Units 04/20/24 2050   WBC 10*3/mm3 9.75   HEMOGLOBIN g/dL 13.5   HEMATOCRIT % 43.7   PLATELETS 10*3/mm3 299     Results from last 7 days   Lab Units 04/21/24  0227 04/20/24 2050   SODIUM mmol/L  --  123*   POTASSIUM mmol/L  --  4.1   CHLORIDE mmol/L  --  84*   CO2 mmol/L  --  24.0   BUN mg/dL  --  46*   CREATININE mg/dL  --  2.50*   CALCIUM mg/dL  --  9.2   BILIRUBIN mg/dL  --  0.6   ALK PHOS U/L  --  141*   ALT (SGPT) U/L  --  10   AST (SGOT) U/L  --  7   GLUCOSE mg/dL 555* 821*                 Cultures:  No results  "found for: \"BLOODCX\", \"URINECX\", \"WOUNDCX\", \"MRSACX\", \"RESPCX\", \"STOOLCX\"    I have reviewed daily medications and changes in CPOE    Scheduled meds  amLODIPine, 10 mg, Oral, Daily  atorvastatin, 80 mg, Oral, Nightly  bisoprolol, 5 mg, Oral, Daily  DULoxetine, 30 mg, Oral, Daily  HYDROcodone-acetaminophen, 1 tablet, Oral, TID  insulin glargine, 25 Units, Subcutaneous, Q12H  insulin lispro, 2-7 Units, Subcutaneous, 4x Daily AC & at Bedtime  insulin lispro, 5 Units, Subcutaneous, TID With Meals  [START ON 4/22/2024] levothyroxine, 112 mcg, Oral, Once per day on Monday Tuesday Wednesday Thursday Friday Saturday  levothyroxine, 168 mcg, Oral, Every Sunday  pantoprazole, 40 mg, Oral, Q AM  pregabalin, 50 mg, Oral, Daily  sodium chloride, 10 mL, Intravenous, Q12H  cyanocobalamin, 1,000 mcg, Oral, Daily        lactated ringers, 100 mL/hr, Last Rate: 100 mL/hr (04/21/24 0603)      PRN meds    senna-docusate sodium **AND** polyethylene glycol **AND** bisacodyl **AND** bisacodyl    dextrose    dextrose    glucagon (human recombinant)    ondansetron    sodium chloride    sodium chloride        Uncontrolled diabetes mellitus with hyperglycemia    Primary hypothyroidism    VIPUL (acute kidney injury)    Hypertensive kidney disease with stage 3a chronic kidney disease    HTN (hypertension)    Pseudohyponatremia        Assessment/Plan:    Type 2 diabetes  -Blood sugars initially 821 on arrival with a normal anion gap and normal bicarbonate  -Patient given 25 units of Lantus and 10 units regular insulin and blood sugars are now 400s  -Continue with sliding scale insulin  -Restarting home regimen 25 units twice daily along with 5 units with meals  -Diabetes education  -Patient tolerating oral intake    Acute kidney injury on chronic kidney disease  -Baseline creatinine 1.3 creatinine on admission was 2.5  -Continue with IV fluid    Pseudohyponatremia  -Recheck sodium    Hypertension  -Norvasc and bisoprolol    Neuroendocrine " tumor  -followed by Dr Gutierrez  -she is due for octretide injection tomorrow, will ask if this can be done while in the hospital    Nolan Dasilva MD  04/21/24  11:09 EDT

## 2024-04-21 NOTE — H&P
Patient Name:  Halie Guidry  YOB: 1940  MRN:  [id number]  Admit Date:  4/20/2024  Patient Care Team:  Napoleon Mead MD as PCP - General (Family Medicine)  Lilia Orona APRN as Nurse Practitioner (Nurse Practitioner)  Beena Laguerre APRN as Referring Physician (Gastroenterology)  Napoleon Gutierrez MD as Consulting Physician (Hematology and Oncology)  Stefan Gutierrez MD as Consulting Physician (Pain Medicine)      Subjective   History Present Illness     Chief Complaint   Patient presents with   • Altered Mental Status       Ms. Guidry is a 83 y.o. female with a history of hypertension, type 2 diabetes mellitus, and hypothyroidism that presents to [facility] complaining of weakness and confusion.  Per report, the son brought the patient in due to confusion.  He is not at bedside during my evaluation, however the patient seems to have returned to her mental baseline.  She is fully oriented and able to give me the history herself.  She has been out of insulin for several weeks.  States that she called numerous pharmacies and was not able to get it refilled for some reason.  The patient has not been taking her insulin during this time.  Today she was reportedly more confused with frequent urination, and because of this, her son brought her to the hospital for further evaluation.  In the ER, patient was found to be severely hyperglycemic.  No evidence of DKA.  She also had an VIPUL.  The patient was given IV fluids and insulin and admitted to [facility] for further management.    Personal History     Past Medical History:   Diagnosis Date   • Acute metabolic encephalopathy 6/24/2022   • Anxiety    • Arthritis    • Benign essential hypertension 08/20/2014   • Bleeding disorder    • Depression    • Diabetes    • Diabetes mellitus, type 2    • Disc degeneration, lumbar    • Headache, tension-type    • Hyperlipidemia    • Hypothyroidism    • Neuropathy    • Osteoporosis  2015   • Peripheral neuropathy    • Rotator cuff tear, left    • Scoliosis    • Shoulder pain     LEFT, TORN ROTATOR CUFF S/P FALL   • Spinal stenosis      Past Surgical History:   Procedure Laterality Date   • APPENDECTOMY     • BILATERAL BREAST REDUCTION Bilateral 2015   • CATARACT EXTRACTION  2015   • CHOLECYSTECTOMY WITH INTRAOPERATIVE CHOLANGIOGRAM N/A 3/27/2022    Procedure: CHOLECYSTECTOMY LAPAROSCOPIC INTRAOPERATIVE CHOLANGIOGRAM;  Surgeon: Aiyana Hill MD;  Location: [facility] MAIN OR;  Service: General;  Laterality: N/A;   • COLONOSCOPY  2015    WNL   • ERCP N/A 2022    Procedure: ENDOSCOPIC RETROGRADE CHOLANGIOPANCREATOGRAPHY with sphincterotomy and balloon sweep;  Surgeon: Chilo Wilhelm MD;  Location: [facility] ENDOSCOPY;  Service: Gastroenterology;  Laterality: N/A;  PRE/POST - CBD stones   • ERCP N/A 3/28/2022    Procedure: ENDOSCOPIC RETROGRADE CHOLANGIOPANCREATOGRAPHY WITH SPHINCTEROTOMY AND BALLOON SWEEP;  Surgeon: Chilo Wilhelm MD;  Location: [facility] ENDOSCOPY;  Service: Gastroenterology;  Laterality: N/A;  PRE: COMMON DUCT STONE  POST: COMMON DUCT STONE   • KYPHOPLASTY     • REDUCTION MAMMAPLASTY     • TONSILLECTOMY       Family History   Problem Relation Age of Onset   • Bone cancer Mother    • No Known Problems Father    • Heart disease Daughter      Social History     Tobacco Use   • Smoking status: Former     Current packs/day: 0.00     Average packs/day: 1 pack/day for 40.0 years (40.0 ttl pk-yrs)     Types: Cigarettes     Start date:      Quit date:      Years since quittin.3   • Smokeless tobacco: Never   Vaping Use   • Vaping status: Never Used   Substance Use Topics   • Alcohol use: No   • Drug use: No     No current facility-administered medications on file prior to encounter.     Current Outpatient Medications on File Prior to Encounter   Medication Sig Dispense Refill   • amLODIPine (NORVASC) 10 MG tablet Take 1 tablet by mouth Daily.      • atorvastatin (LIPITOR) 80 MG tablet Take 1 tablet by mouth Every Night. Indications: High Amount of Fats in the Blood 30 tablet 2   • BD Pen Needle Naila 2nd Gen 32G X 4 MM misc USE TO INJECT INSULIN FOUR TIMES DAILY 200 each 0   • bisoprolol (ZEBeta) 5 MG tablet Take 1 tablet by mouth Daily. 90 tablet 1   • cadexomer iodine (IODOSORB) 0.9 % gel Apply 1 application  topically to the appropriate area as directed Daily As Needed for Wound Care.     • castor oil-balsam peru (VENELEX) ointment Apply 1 application  topically to the appropriate area as directed Daily.     • dicyclomine (BENTYL) 20 MG tablet Take 1 tablet by mouth 3 (Three) Times a Day As Needed for Abdominal Cramping.     • DULoxetine (CYMBALTA) 30 MG capsule Take 1 capsule by mouth Daily. 90 capsule 1   • Emollient (CeraVe Moisturizing) cream APPLY 1 APPLICATION ON THE SKIN TWICE A DAY     • furosemide (LASIX) 20 MG tablet Take 1 tablet by mouth Daily. 30 tablet 3   • HYDROcodone-acetaminophen (NORCO)  MG per tablet Take 1 tablet by mouth 3 (Three) Times a Day. 9 tablet 0   • hydrocortisone-zinc oxide-bacitracin-nystatin cream Apply 1 application topically to the appropriate area as directed 2 (Two) Times a Day As Needed (rash). 5 g 0   • Insulin Glargine, 1 Unit Dial, (TOUJEO) 300 UNIT/ML solution pen-injector injection Inject 25 Units under the skin into the appropriate area as directed 2 (Two) Times a Day.     • insulin lispro (HUMALOG/ADMELOG) 100 UNIT/ML injection Inject 2-7 Units under the skin into the appropriate area as directed 4 (Four) Times a Day Before Meals & at Bedtime.     • insulin lispro (HUMALOG/ADMELOG) 100 UNIT/ML injection Inject 5 Units under the skin into the appropriate area as directed 3 (Three) Times a Day With Meals.     • lansoprazole (PREVACID) 30 MG capsule TAKE 1 CAPSULE DAILY 30 capsule 11   • levothyroxine (SYNTHROID, LEVOTHROID) 112 MCG tablet levothyroxine 112 mcg 1 tablet daily, except on Sunday take 1.5  tablets. (Patient taking differently: Take 1 tablet by mouth See Admin Instructions. Take everyday EXCEPT Sunday take 1.5 tab) 96 tablet 0   • levothyroxine (SYNTHROID, LEVOTHROID) 112 MCG tablet Take 1.5 tablets by mouth 1 (One) Time Per Week. Take on Sunday     • lisinopril (PRINIVIL,ZESTRIL) 40 MG tablet Take 1 tablet by mouth Daily. 90 tablet 1   • mineral oil-hydrophilic petrolatum (AQUAPHOR) ointment APPLY 1 APPLICATION ON THE SKIN TWICE A DAY     • multivitamin with minerals (CertaVite Senior) tablet tablet Take 1 tablet by mouth Daily.     • naloxone (NARCAN) 4 MG/0.1ML nasal spray Call 911. Don't prime. [place] in 1 nostril for overdose. Repeat in 2-3 minutes in other nostril if no or minimal breathing/responsiveness. 2 each 0   • pregabalin (LYRICA) 50 MG capsule Take 1 capsule by mouth Daily. 3 capsule 0   • vitamin B-12 (VITAMIN B-12) 1000 MCG tablet Take 1 tablet by mouth Daily.       Allergies   Allergen Reactions   • Sulfa Antibiotics Unknown - High Severity     Pt says it was a long time ago and I don't remember but it wasn't good.        Objective    Objective     Vital Signs  Temp:  [96 °F (35.6 °C)] 96 °F (35.6 °C)  Heart Rate:  [62-69] 62  Resp:  [18] 18  BP: (169)/(72) 169/72  SpO2:  [98 %-99 %] 99 %  on   ;   Device (Oxygen Therapy): room air  Body mass index is 31.28 kg/m².    Physical Exam  General: Alert, no acute distress.  Chronically ill-appearing.  Fully oriented to person, place, time, and situation.  ENT: No conjunctival injection or scleral icterus. Moist mucous membranes.   Neuro: Eyes open and moving in all directions, strength normal in all extremities, no focal deficits.  Face symmetric.  Cranial nerves grossly intact.  Lungs: Clear to auscultation bilaterally. No wheeze or crackles. No distress.   Heart: RRR, no murmurs. No edema.  Abdomen: Soft, non-tender, non-distended. Normal bowel sounds.  Ext: Warm and well-perfused. No edema.   Skin: No rashes or lesions. IV site without  swelling or erythema.     Lab Results (last 24 hours)       Procedure Component Value Units Date/Time    CBC & Differential [[id number]]  (Abnormal) Collected: 04/20/24 2050    Specimen: Blood Updated: 04/20/24 2059    Narrative:      The following orders were created for panel order CBC & Differential.  Procedure                               Abnormality         Status                     ---------                               -----------         ------                     CBC Auto Differential[[id number]]        Abnormal            Final result                 Please view results for these tests on the individual orders.    Comprehensive Metabolic Panel [[id number]]  (Abnormal) Collected: 04/20/24 2050    Specimen: Blood Updated: 04/20/24 2150     Glucose 821 mg/dL      BUN 46 mg/dL      Creatinine 2.50 mg/dL      Sodium 123 mmol/L      Potassium 4.1 mmol/L      Chloride 84 mmol/L      CO2 24.0 mmol/L      Calcium 9.2 mg/dL      Total Protein 8.3 g/dL      Albumin 4.4 g/dL      ALT (SGPT) 10 U/L      AST (SGOT) 7 U/L      Alkaline Phosphatase 141 U/L      Total Bilirubin 0.6 mg/dL      Globulin 3.9 gm/dL      A/G Ratio 1.1 g/dL      BUN/Creatinine Ratio 18.4     Anion Gap 15.0 mmol/L      eGFR 18.7 mL/min/1.73     Narrative:      GFR Normal >60  Chronic Kidney Disease <60  Kidney Failure <15    The GFR formula is only valid for adults with stable renal function between ages 18 and 70.    Procalcitonin [[id number]]  (Normal) Collected: 04/20/24 2050    Specimen: Blood Updated: 04/20/24 2125     Procalcitonin 0.10 ng/mL     Narrative:      As a Marker for Sepsis (Non-Neonates):    1. <0.5 ng/mL represents a low risk of severe sepsis and/or septic shock.  2. >2 ng/mL represents a high risk of severe sepsis and/or septic shock.    As a Marker for Lower Respiratory Tract Infections that require antibiotic therapy:    PCT on Admission    Antibiotic Therapy       6-12 Hrs later    >0.5                Strongly  "Recommended  >0.25 - <0.5        Recommended   0.1 - 0.25          Discouraged              Remeasure/reassess PCT  <0.1                Strongly Discouraged     Remeasure/reassess PCT    As 28 day mortality risk marker: \"Change in Procalcitonin Result\" (>80% or <=80%) if Day 0 (or Day 1) and Day 4 values are available. Refer to http://www.[facility]-pct-calculator.com    Change in PCT <=80%  A decrease of PCT levels below or equal to 80% defines a positive change in PCT test result representing a higher risk for 28-day all-cause mortality of patients diagnosed with severe sepsis for septic shock.    Change in PCT >80%  A decrease of PCT levels of more than 80% defines a negative change in PCT result representing a lower risk for 28-day all-cause mortality of patients diagnosed with severe sepsis or septic shock.       CBC Auto Differential [[id number]]  (Abnormal) Collected: 04/20/24 2050    Specimen: Blood Updated: 04/20/24 2059     WBC 9.75 10*3/mm3      RBC 4.83 10*6/mm3      Hemoglobin 13.5 g/dL      Hematocrit 43.7 %      MCV 90.5 fL      MCH 28.0 pg      MCHC 30.9 g/dL      RDW 13.9 %      RDW-SD 46.4 fl      MPV 10.3 fL      Platelets 299 10*3/mm3      Neutrophil % 83.1 %      Lymphocyte % 9.1 %      Monocyte % 5.1 %      Eosinophil % 1.6 %      Basophil % 0.7 %      Immature Grans % 0.4 %      Neutrophils, Absolute 8.09 10*3/mm3      Lymphocytes, Absolute 0.89 10*3/mm3      Monocytes, Absolute 0.50 10*3/mm3      Eosinophils, Absolute 0.16 10*3/mm3      Basophils, Absolute 0.07 10*3/mm3      Immature Grans, Absolute 0.04 10*3/mm3      nRBC 0.0 /100 WBC             Imaging Results (Last 24 Hours)       ** No results found for the last 24 hours. **            Results for orders placed during the hospital encounter of 06/22/22    Adult transthoracic echo complete    Interpretation Summary  · Left ventricular wall thickness is consistent with mild concentric hypertrophy.  · Calculated left ventricular EF = 68% " Estimated left ventricular EF was in agreement with the calculated left ventricular EF. Left ventricular systolic function is normal.  · Normal right ventricular cavity size and systolic function noted.  · The left atrial cavity is mildly dilated  · Saline test results are negative.  · Mild mitral valve regurgitation is present.  · There is no evidence of pericardial effusion    No orders to display     Assessment/Plan   Assessment & Plan   Active Hospital Problems    Diagnosis  POA   • **Uncontrolled diabetes mellitus with hyperglycemia [E11.65]  Yes   • HTN (hypertension) [I10]  Unknown   • Pseudohyponatremia [R79.89]  Unknown   • Hypertensive kidney disease with stage 3a chronic kidney disease [I12.9, N18.31]  Yes   • VIPUL (acute kidney injury) [N17.9]  Yes   • Primary hypothyroidism [E03.9]  Yes      Resolved Hospital Problems   No resolved problems to display.       83 y.o. female admitted with Uncontrolled diabetes mellitus with hyperglycemia.    Type 2 diabetes mellitus  Severe hyperglycemia  Confusion  -Blood glucose 821 on arrival, bicarbonate and anion gap are normal  -Patient fully oriented, seems to be back to her mental baseline.  Was likely secondary to dehydration and hyperglycemia.  Can pursue additional workup if symptoms return.  -Given 1 L IV fluid in the ER  -S/p Lantus 25 units and regular insulin 10 units  -Check POC glucose every 4 hours for now  -Start SSI  -Restart home regimen of long-acting 25 units twice daily and 5 of Humalog 3 times daily with meals  -Consult diabetes educator    VIPUL on CKD  -Baseline creatinine seems to be around 1.3  -Suspect prerenal due to decreased oral intake, as patient has been feeling poorly  -Creatinine elevated to 2.5 on admission  -Given IV fluids in the ER  -Start LR at 100 cc an hour  -Monitor with daily BMP    Pseudohyponatremia  -Sodium corrects to 135 for hyperglycemia  -Monitor with daily BMP    Hypertension  -Blood pressure elevated at this  time  -Restart home medications awaiting completion of med rec  -Titrate meds as needed based on BP trends  -Hold lisinopril and Lasix due to VIPUL    SCDs for DVT prophylaxis.  DNR.  Discussed with patient and ED provider.    Patient was seen and evaluated prior to midnight on 4/20.  Documentation was delayed due to patient care.    Savita Mccormick MD  [facility]  04/21/24  00:25 EDT

## 2024-04-21 NOTE — PLAN OF CARE
Goal Outcome Evaluation:         Pt admit from home due to uncontrolled DM with hyperglycemia, altered mental status and weakness. Pt had been unable to procure her insulin from pharmacy. Blood sugar per chart on admit was 821.Pt lives with her son and reported discharge from snf March first after rehab stay. She reported one fall since she returned home. She stated that she amb with rwx at home household distances. Pt appeared lethargic prior to PT. Impaired strength BLE with impaired DF rom kinsey ankles < neutral DF. Skin changes BLE with edema, redness noted. Sensation NT today. Pt required asst to eob today. Attempted STS with rwx and pt unable to achieve erect standing. Mod asst on second trial. She was able to side step toward  HOB once in standing but fatigued and then scooted toward HOB. Pt demonstrated general weakness, impaired rom kinsey ankles that would cause gait deviations, impaired mobility in all areas, impaired balance and impaired endurance. She will likely benefit from cont skilled PT to address deficits for hopeful progression toward home at D/c. Possible need for snf.

## 2024-04-21 NOTE — THERAPY EVALUATION
Patient Name: Halie Guidry  : 1940    MRN: [id number]                              Today's Date: 2024       Admit Date: 2024    Visit Dx:     ICD-10-CM ICD-9-CM   1. Uncontrolled type 1 diabetes mellitus with hyperglycemia  E10.65 250.83     790.29   2. Acute renal failure, unspecified acute renal failure type  N17.9 584.9     Patient Active Problem List   Diagnosis    Compression fracture    Lumbar degenerative disc disease    Type 2 diabetes mellitus with hyperglycemia, with long-term current use of insulin    Hyperlipidemia    Benign essential hypertension    Primary hypothyroidism    Neuropathy    Osteoporosis    Proteinuria    Tobacco abuse    Diabetic eye exam    Generalized weakness    Spinal stenosis    Scoliosis    Arthritis    VBI (vertebrobasilar insufficiency)    Orthostatic hypotension    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    Severe hypothyroidism    Noncompliance with medication regimen    Vitamin D deficiency disease    Altered mental status    Tremor    Seizure    Stage 3a chronic kidney disease    Thoracic degenerative disc disease    Metabolic encephalopathy    VIPUL (acute kidney injury)    Hyperglycemia    Type II diabetes mellitus, uncontrolled    Lower abdominal pain    Transaminitis    UTI (urinary tract infection)    Emphysematous cystitis    Choledocholithiasis    Hypokalemia    Hypoxia    Generalized abdominal pain    History of Clostridium difficile infection    History of ERCP    Acute UTI (urinary tract infection)    Hypomagnesemia    Burning pain    Anxiety disorder    Neuropathic pain    Intertrigo    Weakness of both lower extremities    Accelerated hypertension    Acute cystitis without hematuria    Altered mental status, unspecified altered mental status type    Left lower lobe pneumonia    Acute pain of left foot    Cellulitis and abscess of left lower extremity    Hypertensive kidney disease with stage 3a chronic kidney disease     Bilateral leg pain    Peripheral edema    Primary malignant neuroendocrine tumor of pancreas    Encounter for long-term (current) use of high-risk medication    Diabetic foot ulcer    Acute renal insufficiency    C. difficile diarrhea    Sepsis    Stage 3a chronic kidney disease    Metabolic encephalopathy    Pressure injury of buttock, stage 2    Uncontrolled diabetes mellitus with hyperglycemia    HTN (hypertension)    Pseudohyponatremia     Past Medical History:   Diagnosis Date    Acute metabolic encephalopathy 6/24/2022    Anxiety     Arthritis     Benign essential hypertension 08/20/2014    Bleeding disorder     Depression     Diabetes     Diabetes mellitus, type 2     Disc degeneration, lumbar     Headache, tension-type     Hyperlipidemia     Hypothyroidism     Neuropathy     Osteoporosis 09/09/2015    Peripheral neuropathy     Rotator cuff tear, left     Scoliosis     Shoulder pain     LEFT, TORN ROTATOR CUFF S/P FALL    Spinal stenosis      Past Surgical History:   Procedure Laterality Date    APPENDECTOMY      BILATERAL BREAST REDUCTION Bilateral 08/2015    CATARACT EXTRACTION  03/2015    CHOLECYSTECTOMY WITH INTRAOPERATIVE CHOLANGIOGRAM N/A 3/27/2022    Procedure: CHOLECYSTECTOMY LAPAROSCOPIC INTRAOPERATIVE CHOLANGIOGRAM;  Surgeon: Aiyana Hill MD;  Location: [facility] OR;  Service: General;  Laterality: N/A;    COLONOSCOPY  06/05/2015    WNL    ERCP N/A 2/25/2022    Procedure: ENDOSCOPIC RETROGRADE CHOLANGIOPANCREATOGRAPHY with sphincterotomy and balloon sweep;  Surgeon: Chilo Wilhelm MD;  Location: [facility] ENDOSCOPY;  Service: Gastroenterology;  Laterality: N/A;  PRE/POST - CBD stones    ERCP N/A 3/28/2022    Procedure: ENDOSCOPIC RETROGRADE CHOLANGIOPANCREATOGRAPHY WITH SPHINCTEROTOMY AND BALLOON SWEEP;  Surgeon: Chilo Wilhelm MD;  Location: [facility] ENDOSCOPY;  Service: Gastroenterology;  Laterality: N/A;  PRE: COMMON DUCT STONE  POST: COMMON DUCT STONE    KYPHOPLASTY      REDUCTION  MAMMAPLASTY      TONSILLECTOMY        General Information       Row Name 04/21/24 7198          Physical Therapy Time and Intention    Document Type evaluation  -SV     Mode of Treatment individual therapy;physical therapy  -       Row Name 04/21/24 1116          General Information    Patient Profile Reviewed yes  -SV     Prior Level of Function --  pt reported that she was in rehab january to march 1st this year, indep amb with rwx but fell after d/c home from rehab  -SV     Existing Precautions/Restrictions fall  -SV     Barriers to Rehab medically complex;previous functional deficit  -SV       Row Name 04/21/24 4674          Living Environment    People in Home child(beatriz), adult  -SV       Row Name 04/21/24 1333          Home Main Entrance    Number of Stairs, Main Entrance none  -SV       Row Name 04/21/24 9103          Cognition    Orientation Status (Cognition) oriented x 4  -       Row Name 04/21/24 1282          Safety Issues, Functional Mobility    Impairments Affecting Function (Mobility) balance;endurance/activity tolerance;strength;shortness of breath;range of motion (ROM)  -SV               User Key  (r) = Recorded By, (t) = Taken By, (c) = Cosigned By      Initials Name Provider Type    SV Helene Hernandez, PT Physical Therapist                   Mobility       Row Name 04/21/24 1508          Bed Mobility    Bed Mobility supine-sit;sit-supine  -SV     Supine-Sit [place] (Bed Mobility) minimum assist (75% patient effort);moderate assist (50% patient effort)  -SV     Sit-Supine [place] (Bed Mobility) minimum assist (75% patient effort)  -SV     Assistive Device (Bed Mobility) bed rails;head of bed elevated  -       Row Name 04/21/24 7531          Sit-Stand Transfer    Sit-Stand [place] (Transfers) moderate assist (50% patient effort)  -SV     Assistive Device (Sit-Stand Transfers) walker, front-wheeled  -SV     Comment, (Sit-Stand Transfer) unable to achieve standing first trial   -SV       Row Name 04/21/24 1505          Gait/Stairs (Locomotion)    [place] Level (Gait) contact guard;minimum assist (75% patient effort)  -SV     Assistive Device (Gait) walker, front-wheeled  -SV     Distance in Feet (Gait) 2  sidestepping to right HOB  -SV     Comment, (Gait/Stairs) unable to move fully to HOB so she scooted in sitting multiple repetitions until fatigued with sba  -SV               User Key  (r) = Recorded By, (t) = Taken By, (c) = Cosigned By      Initials Name Provider Type     Helene Hernandez, PT Physical Therapist                   Obj/Interventions       Row Name 04/21/24 1509          Range of Motion Comprehensive    Comment, General Range of Motion kinsey ankle rom impaired DF< neutral kinsey , BLE edema, poor skin integrity and possible cellulitis  -SV       Row Name 04/21/24 1509          Strength Comprehensive (MMT)    Comment, General Manual Muscle Testing (MMT) Assessment kinsey ankle DF 2-/5 , gross general strength appeared 3+ to 3-/5 BLE , shoulders NT: other BUE appear > 3/5  -SV       Row Name 04/21/24 1509          Motor Skills    Therapeutic Exercise --  educated on need for improved DF and ankle rom: laq and DF with 10 hold x3 kinsey , encouraged ankle circles  -SV       Row Name 04/21/24 1509          Balance    Balance Assessment sitting static balance;standing static balance  -SV     Static Sitting Balance supervision  -SV     Static Standing Balance contact guard  -SV     Position/Device Used, Standing Balance walker, rolling  -SV       Row Name 04/21/24 1509          Sensory Assessment (Somatosensory)    Sensory Assessment (Somatosensory) not tested  likely impaired due to DF and hyperglycemia  -SV               User Key  (r) = Recorded By, (t) = Taken By, (c) = Cosigned By      Initials Name Provider Type    SV Helene Hernandez, PT Physical Therapist                   Goals/Plan       Row Name 04/21/24 1511          Bed Mobility Goal 1 (PT)    Activity/Assistive Device  (Bed Mobility Goal 1, PT) sit to supine/supine to sit  -SV     [place] Level/Cues Needed (Bed Mobility Goal 1, PT) independent  -SV     Time Frame (Bed Mobility Goal 1, PT) 10 days  -SV       Row Name 04/21/24 1511          Transfer Goal 1 (PT)    Activity/Assistive Device (Transfer Goal 1, PT) sit-to-stand/stand-to-sit;walker, rolling  -SV     [place] Level/Cues Needed (Transfer Goal 1, PT) supervision required  -SV     Time Frame (Transfer Goal 1, PT) 10 days  -SV       Row Name 04/21/24 1511          Gait Training Goal 1 (PT)    Activity/Assistive Device (Gait Training Goal 1, PT) gait (walking locomotion);walker, rolling  -SV     [place] Level (Gait Training Goal 1, PT) standby assist  -SV     Distance (Gait Training Goal 1, PT) 200'  -SV     Time Frame (Gait Training Goal 1, PT) 10 days  -SV       Row Name 04/21/24 1511          ROM Goal 1 (PT)    ROM Goal 1 (PT) DF kinsey ankle arom > neutral  -SV     Time Frame (ROM Goal 1, PT) 10 days  -SV       Row Name 04/21/24 1511          Therapy Assessment/Plan (PT)    Planned Therapy Interventions (PT) balance training;bed mobility training;gait training;home exercise program;patient/family education;stretching;strengthening;stair training;ROM (range of motion);transfer training  -SV               User Key  (r) = Recorded By, (t) = Taken By, (c) = Cosigned By      Initials Name Provider Type    SV Helene Hernandez, PT Physical Therapist                   Clinical Impression    No documentation.                  Outcome Measures       Row Name 04/21/24 1512 04/21/24 0810       How much help from another person do you currently need...    Turning from your back to your side while in flat bed without using bedrails? 4  -SV 4  -DS    Moving from lying on back to sitting on the side of a flat bed without bedrails? 2  -SV 4  -DS    Moving to and from a bed to a chair (including a wheelchair)? 2  -SV 3  -DS    Standing up from a chair using your arms (e.g.,  wheelchair, bedside chair)? 2  -SV 2  -DS    Climbing 3-5 steps with a railing? 1  -SV 1  -DS    To walk in hospital room? 2  -SV 2  -DS    AM-PAC 6 Clicks Score (PT) 13  -SV 16  -DS    Highest Level of Mobility Goal 4 --> Transfer to chair/commode  -SV 5 --> Static standing  -DS              User Key  (r) = Recorded By, (t) = Taken By, (c) = Cosigned By      Initials Name Provider Type    SV Helene Hernandez, PT Physical Therapist    Tasha Campbell, RN Registered Nurse                                 Physical Therapy Education       Title: PT OT SLP Therapies (In Progress)       Topic: Physical Therapy (In Progress)       Point: Mobility training (In Progress)       Learning Progress Summary             Patient Acceptance, E, NR by  at 4/21/2024 1513                         Point: Home exercise program (In Progress)       Learning Progress Summary             Patient Acceptance, E, NR by  at 4/21/2024 1513                                         User Key       Initials Effective Dates Name Provider Type Discipline     07/11/23 -  Helene Hernandez, PT Physical Therapist PT                  PT Recommendation and Plan  Planned Therapy Interventions (PT): balance training, bed mobility training, gait training, home exercise program, patient/family education, stretching, strengthening, stair training, ROM (range of motion), transfer training        Time Calculation:         PT Charges       Row Name 04/21/24 1520             Time Calculation    Start Time 1335  -      Stop Time 1407  -      Time Calculation (min) 32 min  -      PT Received On 04/21/24  -      PT - Next Appointment 04/22/24  -      PT Goal Re-Cert Due Date 05/01/24  -                User Key  (r) = Recorded By, (t) = Taken By, (c) = Cosigned By      Initials Name Provider Type    SV Helene Hernandez, PT Physical Therapist                  Therapy Charges for Today       Code Description Service Date Service Provider Modifiers Qty     27838082486 HC PT EVAL MOD COMPLEXITY 2 4/21/2024 Helene Hernandez, PT GP 1    15424420087 HC PT THERAPEUTIC ACT EA 15 MIN 4/21/2024 Helene Hernandez, PT GP 1            PT G-Codes  AM-PAC 6 Clicks Score (PT): 13       Helene Hernandez, PT  4/21/2024

## 2024-04-21 NOTE — ED NOTES
"Nursing report ED to floor  Halie Guidry  83 y.o.  female    HPI :  HPI (Adult)  Stated Reason for Visit: Pt to ED via EMS for reports of altered mental status, increased weakness. Pt son reports pt has been out of insulin so has not taken it in days. Pt is disoriented to situation at the time of triage. Pt blood glucose at the time of triage read HI.  History Obtained From: patient, family    Chief Complaint  Chief Complaint   Patient presents with    Altered Mental Status       Admitting doctor:   Savita Mccormick MD    Admitting diagnosis:   The primary encounter diagnosis was Uncontrolled type 1 diabetes mellitus with hypoglycemia without coma. A diagnosis of Acute renal failure, unspecified acute renal failure type was also pertinent to this visit.    Code status:   Current Code Status       Date Active Code Status Order ID Comments User Context       Prior            Allergies:   Sulfa antibiotics    Isolation:   No active isolations    Intake and Output    Intake/Output Summary (Last 24 hours) at 4/20/2024 2316  Last data filed at 4/20/2024 2249  Gross per 24 hour   Intake 1000 ml   Output --   Net 1000 ml       Weight:       04/20/24 1921   Weight: 90.6 kg (199 lb 11.8 oz)       Most recent vitals:   Vitals:    04/20/24 1921 04/20/24 1944 04/20/24 2249   BP:  169/72    BP Location:  Left arm    Patient Position:  Sitting    Pulse: 69  62   Resp: 18     Temp: 96 °F (35.6 °C)     TempSrc: Tympanic     SpO2: 98%  99%   Weight: 90.6 kg (199 lb 11.8 oz)     Height: 170.2 cm (67\")         Active LDAs/IV Access:   Lines, Drains & Airways       Active LDAs       Name Placement date Placement time Site Days    Peripheral IV 04/20/24 2051 Anterior;Distal;Right Forearm 04/20/24 2051  Forearm  less than 1                    Labs (abnormal labs have a star):   Labs Reviewed   COMPREHENSIVE METABOLIC PANEL - Abnormal; Notable for the following components:       Result Value    Glucose 821 (*)     BUN 46 (*)     " "Creatinine 2.50 (*)     Sodium 123 (*)     Chloride 84 (*)     Alkaline Phosphatase 141 (*)     eGFR 18.7 (*)     All other components within normal limits    Narrative:     GFR Normal >60  Chronic Kidney Disease <60  Kidney Failure <15    The GFR formula is only valid for adults with stable renal function between ages 18 and 70.   CBC WITH AUTO DIFFERENTIAL - Abnormal; Notable for the following components:    MCHC 30.9 (*)     Neutrophil % 83.1 (*)     Lymphocyte % 9.1 (*)     Neutrophils, Absolute 8.09 (*)     All other components within normal limits   PROCALCITONIN - Normal    Narrative:     As a Marker for Sepsis (Non-Neonates):    1. <0.5 ng/mL represents a low risk of severe sepsis and/or septic shock.  2. >2 ng/mL represents a high risk of severe sepsis and/or septic shock.    As a Marker for Lower Respiratory Tract Infections that require antibiotic therapy:    PCT on Admission    Antibiotic Therapy       6-12 Hrs later    >0.5                Strongly Recommended  >0.25 - <0.5        Recommended   0.1 - 0.25          Discouraged              Remeasure/reassess PCT  <0.1                Strongly Discouraged     Remeasure/reassess PCT    As 28 day mortality risk marker: \"Change in Procalcitonin Result\" (>80% or <=80%) if Day 0 (or Day 1) and Day 4 values are available. Refer to http://www.[employer]-pct-calculator.com    Change in PCT <=80%  A decrease of PCT levels below or equal to 80% defines a positive change in PCT test result representing a higher risk for 28-day all-cause mortality of patients diagnosed with severe sepsis for septic shock.    Change in PCT >80%  A decrease of PCT levels of more than 80% defines a negative change in PCT result representing a lower risk for 28-day all-cause mortality of patients diagnosed with severe sepsis or septic shock.      URINALYSIS W/ MICROSCOPIC IF INDICATED (NO CULTURE)   CBC AND DIFFERENTIAL    Narrative:     The following orders were created for panel order CBC & " Differential.  Procedure                               Abnormality         Status                     ---------                               -----------         ------                     CBC Auto Differential[[id number]]        Abnormal            Final result                 Please view results for these tests on the individual orders.       EKG:   No orders to display       Meds given in ED:   Medications   sodium chloride 0.9 % bolus 1,000 mL (0 mL Intravenous Stopped 24)   insulin glargine (LANTUS, SEMGLEE) injection 25 Units (25 Units Subcutaneous Given 24)   insulin regular (humuLIN R,novoLIN R) injection 10 Units (10 Units Subcutaneous Given 24)       Imaging results:  No radiology results for the last day    Ambulatory status:   - BR    Social issues:   Social History     Socioeconomic History    Marital status:     Number of children: 3   Tobacco Use    Smoking status: Former     Current packs/day: 0.00     Average packs/day: 1 pack/day for 40.0 years (40.0 ttl pk-yrs)     Types: Cigarettes     Start date:      Quit date:      Years since quittin.3    Smokeless tobacco: Never   Vaping Use    Vaping status: Never Used   Substance and Sexual Activity    Alcohol use: No    Drug use: No    Sexual activity: Defer       Peripheral Neurovascular  Peripheral Neurovascular (Adult)  Peripheral Neurovascular WDL: WDL    Neuro Cognitive  Neuro Cognitive (Adult)  Cognitive/Neuro/Behavioral WDL: .WDL except    Learning       Respiratory  Respiratory WDL  Respiratory WDL: WDL    Abdominal Pain       Pain Assessments  Pain (Adult)  (0-10) Pain Rating: Rest: 4    NIH Stroke Scale       Bryant Medina RN  24 23:16 EDT

## 2024-04-21 NOTE — PLAN OF CARE
Problem: Fall Injury Risk  Goal: Absence of Fall and Fall-Related Injury  Outcome: Ongoing, Progressing  Intervention: Promote Injury-Free Environment  Recent Flowsheet Documentation  Taken 4/21/2024 1403 by Tasha Chino RN  Safety Promotion/Fall Prevention:   assistive device/personal items within reach   clutter free environment maintained   nonskid shoes/slippers when out of bed   safety round/check completed  Taken 4/21/2024 1203 by Tasha Chino RN  Safety Promotion/Fall Prevention:   assistive device/personal items within reach   clutter free environment maintained   nonskid shoes/slippers when out of bed   safety round/check completed  Taken 4/21/2024 1004 by Tasha Chino RN  Safety Promotion/Fall Prevention:   assistive device/personal items within reach   clutter free environment maintained   nonskid shoes/slippers when out of bed   safety round/check completed  Taken 4/21/2024 0810 by Tasha Chino RN  Safety Promotion/Fall Prevention:   assistive device/personal items within reach   clutter free environment maintained   nonskid shoes/slippers when out of bed   safety round/check completed     Problem: Adult Inpatient Plan of Care  Goal: Plan of Care Review  Outcome: Ongoing, Progressing  Flowsheets (Taken 4/21/2024 1420)  Progress: improving  Plan of Care Reviewed With: patient  Outcome Evaluation: Pt. alert and orientedx4. BG better controlled. Pt. worked with PT.  Goal: Patient-Specific Goal (Individualized)  Outcome: Ongoing, Progressing  Goal: Absence of Hospital-Acquired Illness or Injury  Outcome: Ongoing, Progressing  Intervention: Identify and Manage Fall Risk  Recent Flowsheet Documentation  Taken 4/21/2024 1403 by Tasha Chino RN  Safety Promotion/Fall Prevention:   assistive device/personal items within reach   clutter free environment maintained   nonskid shoes/slippers when out of bed   safety round/check completed  Taken 4/21/2024 1203 by Tasha Chino RN  Safety  Promotion/Fall Prevention:   assistive device/personal items within reach   clutter free environment maintained   nonskid shoes/slippers when out of bed   safety round/check completed  Taken 4/21/2024 1004 by Tasha Chino RN  Safety Promotion/Fall Prevention:   assistive device/personal items within reach   clutter free environment maintained   nonskid shoes/slippers when out of bed   safety round/check completed  Taken 4/21/2024 0810 by Tasha Chino RN  Safety Promotion/Fall Prevention:   assistive device/personal items within reach   clutter free environment maintained   nonskid shoes/slippers when out of bed   safety round/check completed  Intervention: Prevent and Manage VTE (Venous Thromboembolism) Risk  Recent Flowsheet Documentation  Taken 4/21/2024 1403 by Tasha Chino RN  Activity Management: activity encouraged  Taken 4/21/2024 1203 by Tasha Chino RN  Activity Management: activity encouraged  Taken 4/21/2024 1004 by Tasha Chino RN  Activity Management: activity encouraged  Taken 4/21/2024 0810 by Tasha Chino RN  Activity Management: activity encouraged  Intervention: Prevent Infection  Recent Flowsheet Documentation  Taken 4/21/2024 1403 by Tasha Chion RN  Infection Prevention:   hand hygiene promoted   rest/sleep promoted  Taken 4/21/2024 1203 by Tasha Chino RN  Infection Prevention:   hand hygiene promoted   rest/sleep promoted  Taken 4/21/2024 1004 by Tasha Chino RN  Infection Prevention:   hand hygiene promoted   rest/sleep promoted  Taken 4/21/2024 0810 by Tasha Chino RN  Infection Prevention:   hand hygiene promoted   rest/sleep promoted  Goal: Optimal Comfort and Wellbeing  Outcome: Ongoing, Progressing  Goal: Readiness for Transition of Care  Outcome: Ongoing, Progressing   Goal Outcome Evaluation:  Plan of Care Reviewed With: patient        Progress: improving  Outcome Evaluation: Pt. alert and orientedx4. BG better controlled. Pt. worked with PT.

## 2024-04-21 NOTE — CONSULTS
[facility] Consult Note      Patient Name: Halie Guidry  : 1940  MRN: [id number]  Primary Care Physician:  Napoleon Mead MD  Referring Physician: Franco Lazaro MD  Date of admission: 2024    Subjective     Reason for Consult:   Acute kidney injury on chronic kidney disease    HPI:   Halie Guidry is a 83 y.o. female patient was admitted on 2024 when she presented to the emergency department with weakness and confusion was found to have significant increased her glucose in the 800 range she ran out of her insulin for a few days prior to admission.  Also noted to have increased creatinine and hyponatremia.  Patient history of diabetes mellitus type 2, chronic kidney disease stage III, hypertension, depression, dyslipidemia and hypothyroidism, she had peripheral neuropathy and history of anxiety  Her baseline creatinine in the 1.3 range as noted on 2/10/2024.  On admission creatinine 2.5 down to 2.36  Glucose was 81 on admission and sodium 123, today creatinine down to 2.36 and glucose down to 200 and the sodium has increased to 131.    She complains of weakness, and increased thirst for the past few days, no chest pain or shortness of air, no orthopnea or PND, no nausea or vomiting.  No dysuria or gross hematuria.  She has chronic lower extremity edema    Review of Systems:   14 point review of systems is otherwise negative except for mentioned above on HPI    Personal History     Past Medical History:   Diagnosis Date   • Acute metabolic encephalopathy 2022   • Anxiety    • Arthritis    • Benign essential hypertension 2014   • Bleeding disorder    • Depression    • Diabetes    • Diabetes mellitus, type 2    • Disc degeneration, lumbar    • Headache, tension-type    • Hyperlipidemia    • Hypothyroidism    • Neuropathy    • Osteoporosis 2015   • Peripheral neuropathy    • Rotator cuff tear, left    • Scoliosis    • Shoulder pain     LEFT,  TORN ROTATOR CUFF S/P FALL   • Spinal stenosis        Past Surgical History:   Procedure Laterality Date   • APPENDECTOMY     • BILATERAL BREAST REDUCTION Bilateral 08/2015   • CATARACT EXTRACTION  03/2015   • CHOLECYSTECTOMY WITH INTRAOPERATIVE CHOLANGIOGRAM N/A 3/27/2022    Procedure: CHOLECYSTECTOMY LAPAROSCOPIC INTRAOPERATIVE CHOLANGIOGRAM;  Surgeon: Aiyana Hill MD;  Location: [facility] MAIN OR;  Service: General;  Laterality: N/A;   • COLONOSCOPY  06/05/2015    WNL   • ERCP N/A 2/25/2022    Procedure: ENDOSCOPIC RETROGRADE CHOLANGIOPANCREATOGRAPHY with sphincterotomy and balloon sweep;  Surgeon: Chilo Wilhelm MD;  Location: [facility] ENDOSCOPY;  Service: Gastroenterology;  Laterality: N/A;  PRE/POST - CBD stones   • ERCP N/A 3/28/2022    Procedure: ENDOSCOPIC RETROGRADE CHOLANGIOPANCREATOGRAPHY WITH SPHINCTEROTOMY AND BALLOON SWEEP;  Surgeon: Chilo Wilhelm MD;  Location: [facility] ENDOSCOPY;  Service: Gastroenterology;  Laterality: N/A;  PRE: COMMON DUCT STONE  POST: COMMON DUCT STONE   • KYPHOPLASTY     • REDUCTION MAMMAPLASTY     • TONSILLECTOMY         Family History: family history includes Bone cancer in her mother; Heart disease in her daughter; No Known Problems in her father.    Social History:  reports that she quit smoking about 11 years ago. Her smoking use included cigarettes. She started smoking about 51 years ago. She has a 40 pack-year smoking history. She has never used smokeless tobacco. She reports that she does not drink alcohol and does not use drugs.    Home Medications:  Prior to Admission medications    Medication Sig Start Date End Date Taking? Authorizing Provider   amLODIPine (NORVASC) 10 MG tablet Take 1 tablet by mouth Daily. 12/30/23  Yes Cedrick Mendoza MD   atorvastatin (LIPITOR) 80 MG tablet Take 1 tablet by mouth Every Night. Indications: High Amount of Fats in the Blood 3/14/24  Yes Lilia Orona APRN   bisoprolol (ZEBeta) 5 MG tablet Take 1 tablet by mouth Daily.  12/13/23  Yes Napoleon Mead MD   DULoxetine (CYMBALTA) 30 MG capsule Take 1 capsule by mouth Daily. 12/13/23  Yes Napoleon Mead MD   Emollient (CeraVe Moisturizing) cream APPLY 1 APPLICATION ON THE SKIN TWICE A DAY 3/20/24  Yes Melvin Gusman MD   furosemide (LASIX) 20 MG tablet Take 1 tablet by mouth Daily. 11/13/23  Yes Napoleon Gutierrez MD   HYDROcodone-acetaminophen (NORCO)  MG per tablet Take 1 tablet by mouth 3 (Three) Times a Day. 1/15/24  Yes Kain Morgan MD   Insulin Glargine, 1 Unit Dial, (TOUJEO) 300 UNIT/ML solution pen-injector injection Inject 25 Units under the skin into the appropriate area as directed 2 (Two) Times a Day. 1/15/24  Yes Kain Morgan MD   insulin lispro (HUMALOG/ADMELOG) 100 UNIT/ML injection Inject 2-7 Units under the skin into the appropriate area as directed 4 (Four) Times a Day Before Meals & at Bedtime. 12/30/23  Yes Cedrick Mendoza MD   insulin lispro (HUMALOG/ADMELOG) 100 UNIT/ML injection Inject 5 Units under the skin into the appropriate area as directed 3 (Three) Times a Day With Meals. 1/15/24  Yes Kain Morgan MD   lansoprazole (PREVACID) 30 MG capsule TAKE 1 CAPSULE DAILY 11/15/23  Yes Napoleon Gutierrez MD   levothyroxine (SYNTHROID, LEVOTHROID) 112 MCG tablet levothyroxine 112 mcg 1 tablet daily, except on Sunday take 1.5 tablets.  Patient taking differently: Take 1 tablet by mouth See Admin Instructions. Take everyday EXCEPT Sunday take 1.5 tab 8/21/23  Yes Lilia Orona APRN   levothyroxine (SYNTHROID, LEVOTHROID) 112 MCG tablet Take 1.5 tablets by mouth 1 (One) Time Per Week. Take on Sunday   Yes Melvin Gusman MD   lisinopril (PRINIVIL,ZESTRIL) 40 MG tablet Take 1 tablet by mouth Daily. 12/13/23  Yes Napoleon Mead MD   pregabalin (LYRICA) 50 MG capsule Take 1 capsule by mouth Daily. 2/11/24  Yes Eddie Vela MD   BD Pen Needle Naila 2nd Gen 32G X 4 MM misc USE TO INJECT INSULIN FOUR TIMES DAILY 8/22/22   Solomon Barrow MD    naloxone (NARCAN) 4 MG/0.1ML nasal spray Call 911. Don't prime. [place] in 1 nostril for overdose. Repeat in 2-3 minutes in other nostril if no or minimal breathing/responsiveness. 1/15/24   Kain Morgan MD   cadexomer iodine (IODOSORB) 0.9 % gel Apply 1 application  topically to the appropriate area as directed Daily As Needed for Wound Care. 1/15/24 4/21/24  Kain Morgan MD   castor oil-balsam peru (VENELEX) ointment Apply 1 application  topically to the appropriate area as directed Daily. 1/16/24 4/21/24  Kain Morgan MD   dicyclomine (BENTYL) 20 MG tablet Take 1 tablet by mouth 3 (Three) Times a Day As Needed for Abdominal Cramping.  4/21/24  Melvin Gusman MD   hydrocortisone-zinc oxide-bacitracin-nystatin cream Apply 1 application topically to the appropriate area as directed 2 (Two) Times a Day As Needed (rash). 5/26/22 4/21/24  Gopi Fiore MD   mineral oil-hydrophilic petrolatum (AQUAPHOR) ointment APPLY 1 APPLICATION ON THE SKIN TWICE A DAY 3/20/24 4/21/24  Melvin Gusman MD   multivitamin with minerals (CertaVite Senior) tablet tablet Take 1 tablet by mouth Daily.  4/21/24  Melvin Gusman MD   vitamin B-12 (VITAMIN B-12) 1000 MCG tablet Take 1 tablet by mouth Daily. 6/25/22 4/21/24  Nolan Upton MD       Allergies:  Allergies   Allergen Reactions   • Sulfa Antibiotics Unknown - High Severity     Pt says it was a long time ago and I don't remember but it wasn't good.        Objective     Vitals:   Temp:  [96 °F (35.6 °C)-98.2 °F (36.8 °C)] 98.2 °F (36.8 °C)  Heart Rate:  [56-69] 59  Resp:  [16-18] 16  BP: (106-211)/(42-80) 121/42    Intake/Output Summary (Last 24 hours) at 4/21/2024 1722  Last data filed at 4/21/2024 1440  Gross per 24 hour   Intake 1420 ml   Output 700 ml   Net 720 ml       Physical Exam:   Constitutional: Awake, alert, no acute distress.  Chronically ill  HEENT: Sclera anicteric, no conjunctival injection, edentulous  Neck: Supple, no  thyromegaly, no lymphadenopathy, trachea at midline, no JVD  Respiratory: Clear to auscultation bilaterally, nonlabored respiration  Cardiovascular: RRR, no murmurs, no rubs or gallops  Gastrointestinal: Positive bowel sounds, abdomen is soft, nontender and nondistended  : No palpable bladder  Musculoskeletal: Chronic stasis dermatitis skin is wrinkling and she has mycotic toenails  Psychiatric: Appropriate affect, cooperative  Neurologic: Oriented x3, moving all extremities, normal speech and mental status  Skin: Warm and dry, chronic stasis dermatitis of both lower extremities with mycotic toenails.       Scheduled Meds:     amLODIPine, 10 mg, Oral, Daily  atorvastatin, 80 mg, Oral, Nightly  bisoprolol, 5 mg, Oral, Daily  DULoxetine, 30 mg, Oral, Daily  HYDROcodone-acetaminophen, 1 tablet, Oral, TID  insulin glargine, 25 Units, Subcutaneous, Q12H  insulin lispro, 2-7 Units, Subcutaneous, 4x Daily AC & at Bedtime  insulin lispro, 5 Units, Subcutaneous, TID With Meals  [START ON 4/22/2024] levothyroxine, 112 mcg, Oral, Once per day on Monday Tuesday Wednesday Thursday Friday Saturday  levothyroxine, 168 mcg, Oral, Every Sunday  pantoprazole, 40 mg, Oral, Q AM  potassium chloride, 10 mEq, Oral, Daily  pregabalin, 50 mg, Oral, Daily  sodium chloride, 10 mL, Intravenous, Q12H  cyanocobalamin, 1,000 mcg, Oral, Daily      IV Meds:   lactated ringers, 100 mL/hr, Last Rate: 100 mL/hr (04/21/24 1146)        Results Reviewed:   I have personally reviewed the results from the time of this admission to 4/21/2024 17:22 EDT     Lab Results   Component Value Date    GLUCOSE 261 (H) 04/21/2024    CALCIUM 9.1 04/21/2024     (L) 04/21/2024    K 3.3 (L) 04/21/2024    CO2 24.0 04/21/2024    CL 94 (L) 04/21/2024    BUN 41 (H) 04/21/2024    CREATININE 2.36 (H) 04/21/2024    EGFRIFAFRI 50 (L) 01/18/2021    EGFRIFNONA 51 (L) 02/28/2022    BCR 17.4 04/21/2024    ANIONGAP 13.0 04/21/2024      Lab Results   Component Value Date    MG  1.8 02/03/2024    PHOS 3.9 02/03/2024    ALBUMIN 3.5 04/21/2024           Assessment / Plan       Uncontrolled diabetes mellitus with hyperglycemia    Primary hypothyroidism    VIPUL (acute kidney injury)    Hypertensive kidney disease with stage 3a chronic kidney disease    HTN (hypertension)    Pseudohyponatremia      ASSESSMENT:  Acute kidney injury associated with volume depletion and inability to autoregulate since the patient was taking ACE inhibitor.  Improving since admission.  Chronic kidney disease stage IIIb at baseline secondary to diabetic and hypertensive glomerulosclerosis  Hypertonic hyponatremia associate with severe hyperglycemia improving  Hypertension with chronic kidney disease, reasonably controlled  Diabetes mellitus type 2 with renal complication  Diabetic neuropathy  Hypothyroidism  Hypokalemia, potassium 3.3    PLAN:  Check random urine for sodium, chloride and protein to creatinine ratio  I agree with the current IV fluid, lactated Ringer's at 100 cc/h  Check orthostatic blood pressure  Will continue to hold off ACE inhibitor until renal function is stabilized  Discontinue routine potassium and give 1 dose of KCl 40 mEq  Surveillance labs    I reviewed the chart and other providers notes, reviewed labs.  Discussed the case with the patient.    Thank you for involving us in the care of Halie Guidry.  Please feel free to call with any questions.    Chidi Lanier MD  04/21/24  17:22 EDT    [facility]  [phone]      Please note that portions of this note were completed with a voice recognition program.

## 2024-04-22 LAB
ALBUMIN SERPL-MCNC: 3.3 G/DL (ref 3.5–5.2)
ALBUMIN/GLOB SERPL: 1 G/DL
ALP SERPL-CCNC: 104 U/L (ref 39–117)
ALT SERPL W P-5'-P-CCNC: <5 U/L (ref 1–33)
ANION GAP SERPL CALCULATED.3IONS-SCNC: 13.2 MMOL/L (ref 5–15)
AST SERPL-CCNC: 11 U/L (ref 1–32)
BACTERIA UR QL AUTO: ABNORMAL /HPF
BASOPHILS # BLD AUTO: 0.09 10*3/MM3 (ref 0–0.2)
BASOPHILS NFR BLD AUTO: 1 % (ref 0–1.5)
BILIRUB SERPL-MCNC: 0.4 MG/DL (ref 0–1.2)
BILIRUB UR QL STRIP: NEGATIVE
BUN SERPL-MCNC: 42 MG/DL (ref 8–23)
BUN/CREAT SERPL: 23.1 (ref 7–25)
CALCIUM SPEC-SCNC: 9.1 MG/DL (ref 8.6–10.5)
CHLORIDE SERPL-SCNC: 98 MMOL/L (ref 98–107)
CLARITY UR: ABNORMAL
CO2 SERPL-SCNC: 21.8 MMOL/L (ref 22–29)
COLOR UR: YELLOW
CREAT SERPL-MCNC: 1.82 MG/DL (ref 0.57–1)
DEPRECATED RDW RBC AUTO: 44 FL (ref 37–54)
EGFRCR SERPLBLD CKD-EPI 2021: 27.3 ML/MIN/1.73
EOSINOPHIL # BLD AUTO: 0.3 10*3/MM3 (ref 0–0.4)
EOSINOPHIL NFR BLD AUTO: 3.4 % (ref 0.3–6.2)
ERYTHROCYTE [DISTWIDTH] IN BLOOD BY AUTOMATED COUNT: 14.1 % (ref 12.3–15.4)
GLOBULIN UR ELPH-MCNC: 3.3 GM/DL
GLUCOSE BLDC GLUCOMTR-MCNC: 163 MG/DL (ref 70–130)
GLUCOSE BLDC GLUCOMTR-MCNC: 329 MG/DL (ref 70–130)
GLUCOSE BLDC GLUCOMTR-MCNC: 346 MG/DL (ref 70–130)
GLUCOSE BLDC GLUCOMTR-MCNC: 414 MG/DL (ref 70–130)
GLUCOSE BLDC GLUCOMTR-MCNC: >599 MG/DL (ref 70–130)
GLUCOSE SERPL-MCNC: 385 MG/DL (ref 65–99)
GLUCOSE UR STRIP-MCNC: ABNORMAL MG/DL
HCT VFR BLD AUTO: 35.5 % (ref 34–46.6)
HGB BLD-MCNC: 11.6 G/DL (ref 12–15.9)
HGB UR QL STRIP.AUTO: ABNORMAL
HYALINE CASTS UR QL AUTO: ABNORMAL /LPF
IMM GRANULOCYTES # BLD AUTO: 0.04 10*3/MM3 (ref 0–0.05)
IMM GRANULOCYTES NFR BLD AUTO: 0.5 % (ref 0–0.5)
KETONES UR QL STRIP: NEGATIVE
LEUKOCYTE ESTERASE UR QL STRIP.AUTO: ABNORMAL
LYMPHOCYTES # BLD AUTO: 1.61 10*3/MM3 (ref 0.7–3.1)
LYMPHOCYTES NFR BLD AUTO: 18.4 % (ref 19.6–45.3)
MAGNESIUM SERPL-MCNC: 1.5 MG/DL (ref 1.6–2.4)
MCH RBC QN AUTO: 28.1 PG (ref 26.6–33)
MCHC RBC AUTO-ENTMCNC: 32.7 G/DL (ref 31.5–35.7)
MCV RBC AUTO: 86 FL (ref 79–97)
MONOCYTES # BLD AUTO: 0.74 10*3/MM3 (ref 0.1–0.9)
MONOCYTES NFR BLD AUTO: 8.4 % (ref 5–12)
NEUTROPHILS NFR BLD AUTO: 5.98 10*3/MM3 (ref 1.7–7)
NEUTROPHILS NFR BLD AUTO: 68.3 % (ref 42.7–76)
NITRITE UR QL STRIP: NEGATIVE
NRBC BLD AUTO-RTO: 0 /100 WBC (ref 0–0.2)
PH UR STRIP.AUTO: <=5 [PH] (ref 5–8)
PHOSPHATE SERPL-MCNC: 3.3 MG/DL (ref 2.5–4.5)
PLATELET # BLD AUTO: 252 10*3/MM3 (ref 140–450)
PMV BLD AUTO: 10.3 FL (ref 6–12)
POTASSIUM SERPL-SCNC: 3.7 MMOL/L (ref 3.5–5.2)
PROT SERPL-MCNC: 6.6 G/DL (ref 6–8.5)
PROT UR QL STRIP: ABNORMAL
RBC # BLD AUTO: 4.13 10*6/MM3 (ref 3.77–5.28)
RBC # UR STRIP: ABNORMAL /HPF
REF LAB TEST METHOD: ABNORMAL
SODIUM SERPL-SCNC: 133 MMOL/L (ref 136–145)
SP GR UR STRIP: 1.01 (ref 1–1.03)
SQUAMOUS #/AREA URNS HPF: ABNORMAL /HPF
TSH SERPL DL<=0.05 MIU/L-ACNC: 1.8 UIU/ML (ref 0.27–4.2)
URATE SERPL-MCNC: 8.4 MG/DL (ref 2.4–5.7)
UROBILINOGEN UR QL STRIP: ABNORMAL
WBC # UR STRIP: ABNORMAL /HPF
WBC NRBC COR # BLD AUTO: 8.76 10*3/MM3 (ref 3.4–10.8)
YEAST URNS QL MICRO: PRESENT /HPF

## 2024-04-22 PROCEDURE — 85025 COMPLETE CBC W/AUTO DIFF WBC: CPT | Performed by: STUDENT IN AN ORGANIZED HEALTH CARE EDUCATION/TRAINING PROGRAM

## 2024-04-22 PROCEDURE — 25010000002 MAGNESIUM SULFATE IN D5W 1G/100ML (PREMIX) 1-5 GM/100ML-% SOLUTION: Performed by: HOSPITALIST

## 2024-04-22 PROCEDURE — 63710000001 INSULIN LISPRO (HUMAN) PER 5 UNITS: Performed by: HOSPITALIST

## 2024-04-22 PROCEDURE — 83735 ASSAY OF MAGNESIUM: CPT | Performed by: INTERNAL MEDICINE

## 2024-04-22 PROCEDURE — 84550 ASSAY OF BLOOD/URIC ACID: CPT | Performed by: INTERNAL MEDICINE

## 2024-04-22 PROCEDURE — 84100 ASSAY OF PHOSPHORUS: CPT | Performed by: INTERNAL MEDICINE

## 2024-04-22 PROCEDURE — 63710000001 INSULIN GLARGINE PER 5 UNITS: Performed by: HOSPITALIST

## 2024-04-22 PROCEDURE — 82948 REAGENT STRIP/BLOOD GLUCOSE: CPT

## 2024-04-22 PROCEDURE — 25810000003 LACTATED RINGERS PER 1000 ML: Performed by: STUDENT IN AN ORGANIZED HEALTH CARE EDUCATION/TRAINING PROGRAM

## 2024-04-22 PROCEDURE — 63710000001 INSULIN LISPRO (HUMAN) PER 5 UNITS: Performed by: STUDENT IN AN ORGANIZED HEALTH CARE EDUCATION/TRAINING PROGRAM

## 2024-04-22 PROCEDURE — 84443 ASSAY THYROID STIM HORMONE: CPT | Performed by: HOSPITALIST

## 2024-04-22 PROCEDURE — 63710000001 INSULIN GLARGINE PER 5 UNITS: Performed by: STUDENT IN AN ORGANIZED HEALTH CARE EDUCATION/TRAINING PROGRAM

## 2024-04-22 PROCEDURE — 80053 COMPREHEN METABOLIC PANEL: CPT | Performed by: HOSPITALIST

## 2024-04-22 RX ORDER — AMMONIUM LACTATE 12 G/100G
1 CREAM TOPICAL 2 TIMES DAILY
Status: DISCONTINUED | OUTPATIENT
Start: 2024-04-22 | End: 2024-05-01 | Stop reason: HOSPADM

## 2024-04-22 RX ORDER — TORSEMIDE 20 MG/1
40 TABLET ORAL DAILY
Status: DISCONTINUED | OUTPATIENT
Start: 2024-04-22 | End: 2024-04-26

## 2024-04-22 RX ORDER — INSULIN LISPRO 100 [IU]/ML
INJECTION, SOLUTION INTRAVENOUS; SUBCUTANEOUS
Qty: 15 ML | Refills: 0 | Status: CANCELLED | OUTPATIENT
Start: 2024-04-22

## 2024-04-22 RX ORDER — CASTOR OIL AND BALSAM, PERU 788; 87 MG/G; MG/G
1 OINTMENT TOPICAL EVERY 12 HOURS SCHEDULED
Status: DISCONTINUED | OUTPATIENT
Start: 2024-04-22 | End: 2024-05-01 | Stop reason: HOSPADM

## 2024-04-22 RX ORDER — MAGNESIUM SULFATE 1 G/100ML
1 INJECTION INTRAVENOUS
Status: COMPLETED | OUTPATIENT
Start: 2024-04-22 | End: 2024-04-22

## 2024-04-22 RX ORDER — INSULIN LISPRO 100 [IU]/ML
10 INJECTION, SOLUTION INTRAVENOUS; SUBCUTANEOUS
Status: DISCONTINUED | OUTPATIENT
Start: 2024-04-22 | End: 2024-04-24

## 2024-04-22 RX ADMIN — AMLODIPINE BESYLATE 10 MG: 10 TABLET ORAL at 08:36

## 2024-04-22 RX ADMIN — HYDROCODONE BITARTRATE AND ACETAMINOPHEN 1 TABLET: 10; 325 TABLET ORAL at 08:36

## 2024-04-22 RX ADMIN — LEVOTHYROXINE SODIUM 112 MCG: 112 TABLET ORAL at 05:00

## 2024-04-22 RX ADMIN — INSULIN GLARGINE 25 UNITS: 100 INJECTION, SOLUTION SUBCUTANEOUS at 08:37

## 2024-04-22 RX ADMIN — INSULIN GLARGINE 32 UNITS: 100 INJECTION, SOLUTION SUBCUTANEOUS at 20:48

## 2024-04-22 RX ADMIN — MAGNESIUM SULFATE IN DEXTROSE 1 G: 10 INJECTION, SOLUTION INTRAVENOUS at 14:00

## 2024-04-22 RX ADMIN — BISOPROLOL FUMARATE 5 MG: 5 TABLET, FILM COATED ORAL at 08:36

## 2024-04-22 RX ADMIN — LIDOCAINE 1 APPLICATION: 4 CREAM TOPICAL at 20:48

## 2024-04-22 RX ADMIN — SODIUM CHLORIDE, POTASSIUM CHLORIDE, SODIUM LACTATE AND CALCIUM CHLORIDE 100 ML/HR: 600; 310; 30; 20 INJECTION, SOLUTION INTRAVENOUS at 01:46

## 2024-04-22 RX ADMIN — PREGABALIN 50 MG: 50 CAPSULE ORAL at 08:36

## 2024-04-22 RX ADMIN — INSULIN LISPRO 10 UNITS: 100 INJECTION, SOLUTION INTRAVENOUS; SUBCUTANEOUS at 17:04

## 2024-04-22 RX ADMIN — DULOXETINE HYDROCHLORIDE 30 MG: 30 CAPSULE, DELAYED RELEASE ORAL at 08:36

## 2024-04-22 RX ADMIN — HYDROCODONE BITARTRATE AND ACETAMINOPHEN 1 TABLET: 10; 325 TABLET ORAL at 20:48

## 2024-04-22 RX ADMIN — INSULIN LISPRO 10 UNITS: 100 INJECTION, SOLUTION INTRAVENOUS; SUBCUTANEOUS at 11:53

## 2024-04-22 RX ADMIN — INSULIN LISPRO 7 UNITS: 100 INJECTION, SOLUTION INTRAVENOUS; SUBCUTANEOUS at 11:52

## 2024-04-22 RX ADMIN — MAGNESIUM SULFATE IN DEXTROSE 1 G: 10 INJECTION, SOLUTION INTRAVENOUS at 11:52

## 2024-04-22 RX ADMIN — Medication 1000 MCG: at 08:36

## 2024-04-22 RX ADMIN — INSULIN LISPRO 5 UNITS: 100 INJECTION, SOLUTION INTRAVENOUS; SUBCUTANEOUS at 17:04

## 2024-04-22 RX ADMIN — INSULIN LISPRO 2 UNITS: 100 INJECTION, SOLUTION INTRAVENOUS; SUBCUTANEOUS at 20:49

## 2024-04-22 RX ADMIN — TORSEMIDE 40 MG: 20 TABLET ORAL at 11:52

## 2024-04-22 RX ADMIN — HYDROCODONE BITARTRATE AND ACETAMINOPHEN 1 TABLET: 10; 325 TABLET ORAL at 15:02

## 2024-04-22 RX ADMIN — MAGNESIUM SULFATE IN DEXTROSE 1 G: 10 INJECTION, SOLUTION INTRAVENOUS at 12:59

## 2024-04-22 RX ADMIN — Medication 10 ML: at 20:49

## 2024-04-22 RX ADMIN — PANTOPRAZOLE SODIUM 40 MG: 40 TABLET, DELAYED RELEASE ORAL at 05:00

## 2024-04-22 RX ADMIN — INSULIN LISPRO 5 UNITS: 100 INJECTION, SOLUTION INTRAVENOUS; SUBCUTANEOUS at 08:36

## 2024-04-22 RX ADMIN — ZINC OXIDE 1 APPLICATION: 200 OINTMENT TOPICAL at 20:48

## 2024-04-22 RX ADMIN — ATORVASTATIN CALCIUM 80 MG: 80 TABLET, FILM COATED ORAL at 20:48

## 2024-04-22 RX ADMIN — INSULIN LISPRO 6 UNITS: 100 INJECTION, SOLUTION INTRAVENOUS; SUBCUTANEOUS at 08:39

## 2024-04-22 RX ADMIN — CASTOR OIL AND BALSAM, PERU 1 APPLICATION: 788; 87 OINTMENT TOPICAL at 20:48

## 2024-04-22 RX ADMIN — Medication 10 ML: at 08:39

## 2024-04-22 NOTE — SIGNIFICANT NOTE
Pt declined PT today due to headache and not feeling well. CNA present with BS monitor at 414 today. PT will check on pt tomorrow.

## 2024-04-22 NOTE — DISCHARGE PLACEMENT REQUEST
"Tabatha Guidry (83 y.o. Female)       Date of Birth   1940    Social Security Number       Address   [address] [place] [address] [address] [place] [address]    Home Phone   [phone]    MRN   [id number]       Evangelical   [employer]    Marital Status                               Admission Date   4/20/24    Admission Type   Emergency    Admitting Provider   Savita Mccormick MD    Attending Provider   Nolan Dasilva MD    Department, Room/Bed   [address], [address]/1       Discharge Date       Discharge Disposition       Discharge Destination                                 Attending Provider: Nolan Dasilva MD    Allergies: Sulfa Antibiotics    Isolation: None   Infection: None   Code Status: No CPR    Ht: 170.2 cm (67\")   Wt: 84.1 kg (185 lb 6.5 oz)    Admission Cmt: None   Principal Problem: Uncontrolled diabetes mellitus with hyperglycemia [E11.65]                   Active Insurance as of 4/20/2024       Primary Coverage       Payor Plan Insurance Group Employer/Plan Group    [facility] MEDICARE REPLACEMENT [facility] MED ADV GROUP [id number]       Payor Plan Address Payor Plan Phone Number Payor Plan Fax Number Effective Dates    [address] [address]   1/1/2023 - None Entered    [facility] [id number]         Subscriber Name Subscriber Birth Date Member ID       TABATHA GUIDRY 1940 [id number]                     Emergency Contacts        (Rel.) Home Phone Work Phone Mobile Phone    Catherine Guidryrick (HCS) (Son) [phone] -- [phone]    Josse Guidry (HCS) (Son) -- -- [phone]    JAYME GUIDRY (Grandchild) -- -- [phone]                "

## 2024-04-22 NOTE — NURSING NOTE
Noted DM ed order. Meet with pt at bedside to assess needs for DM ed. Pls see note to follow for more info.

## 2024-04-22 NOTE — PROGRESS NOTES
"Nutrition Services    Patient Name:  Halie Guidry  YOB: 1940  MRN: [id number]  Admit Date:  4/20/2024    Assessment Date:  04/22/24    NUTRITION SCREENING      Reason for Encounter Pressure Injury   Diagnosis/Problem Uncontrolled DM with hyperglycemia, VIPUL, CKD stage IIIb, HTN, diabetic neuropathy, hypothyroidism, hyponatremia       PO Diet Diet: Diabetic; Consistent Carbohydrate; Fluid Consistency: Thin (IDDSI 0)   Supplements    PO Intake % %       Medications MAR reviewed by RD   Labs  Listed below, reviewed   Physical Findings Alert, oriented, room air, 1+ (trace) edema, dental appliance   GI Function WDL   Skin Status Pressure injury on bilateral coccyx - not staged       Height  Weight  BMI  Weight Trend     Height: 170.2 cm (67\")  Weight: 84.1 kg (185 lb 6.5 oz) (04/21/24 0034)  Body mass index is 29.04 kg/m².  Loss       Nutrition Problem (PES) Overweight / Obesity related to diet and lifestyle as evidenced by weight status and/or BMI.       Intervention/Plan Encourage good PO intake.   Will continue to monitor skin status.    RD to follow up per protocol.     Results from last 7 days   Lab Units 04/22/24  0703 04/21/24  1209 04/21/24  0227 04/20/24 2050   SODIUM mmol/L 133* 131*  --  123*   POTASSIUM mmol/L 3.7 3.3*  --  4.1   CHLORIDE mmol/L 98 94*  --  84*   CO2 mmol/L 21.8* 24.0  --  24.0   BUN mg/dL 42* 41*  --  46*   CREATININE mg/dL 1.82* 2.36*  --  2.50*   CALCIUM mg/dL 9.1 9.1  --  9.2   BILIRUBIN mg/dL 0.4 0.4  --  0.6   ALK PHOS U/L 104 117  --  141*   ALT (SGPT) U/L <5 8  --  10   AST (SGOT) U/L 11 9  --  7   GLUCOSE mg/dL 385* 261* 555* 821*     Results from last 7 days   Lab Units 04/22/24  0703   MAGNESIUM mg/dL 1.5*   PHOSPHORUS mg/dL 3.3   HEMOGLOBIN g/dL 11.6*   HEMATOCRIT % 35.5     Lab Results   Component Value Date    HGBA1C 14.20 (H) 04/20/2024         Electronically signed by:  Arcelia Handley  04/22/24 13:49 EDT  "

## 2024-04-22 NOTE — PROGRESS NOTES
[facility]     LOS: 1 day     Subjective:    CC:Altered Mental Status    DIET:  Diet Order   Procedures    Diet: Diabetic; Consistent Carbohydrate; Fluid Consistency: Thin (IDDSI 0)     Eating some  No vomiting  2 steps with PT    Objective:    Vital Signs:  Temp:  [97.7 °F (36.5 °C)-98.6 °F (37 °C)] 97.7 °F (36.5 °C)  Heart Rate:  [59-84] 63  Resp:  [16-17] 16  BP: (104-196)/(42-94) 196/63    SpO2:  [95 %-97 %] 97 %  on  Flow (L/min):  [1] 1;   Device (Oxygen Therapy): room air  Body mass index is 29.04 kg/m².    Physical Exam  Constitutional:       Appearance: Normal appearance.   HENT:      Head: Normocephalic and atraumatic.   Cardiovascular:      Rate and Rhythm: Normal rate and regular rhythm.      Heart sounds: No murmur heard.     No friction rub.   Pulmonary:      Effort: Pulmonary effort is normal.      Breath sounds: Normal breath sounds.   Abdominal:      General: Bowel sounds are normal. There is no distension.      Palpations: Abdomen is soft.      Tenderness: There is no abdominal tenderness.   Skin:     General: Skin is warm and dry.      Comments: Skin thickening of shins   Neurological:      Mental Status: She is alert.   Psychiatric:         Mood and Affect: Mood normal.         Behavior: Behavior normal.         Results Review:    Glucose   Date Value Ref Range Status   04/22/2024 385 (H) 65 - 99 mg/dL Final   04/21/2024 261 (H) 65 - 99 mg/dL Final   04/21/2024 555 (C) 65 - 99 mg/dL Final   04/20/2024 821 (C) 65 - 99 mg/dL Final     Results from last 7 days   Lab Units 04/22/24  0703   WBC 10*3/mm3 8.76   HEMOGLOBIN g/dL 11.6*   HEMATOCRIT % 35.5   PLATELETS 10*3/mm3 252     Results from last 7 days   Lab Units 04/22/24  0703   SODIUM mmol/L 133*   POTASSIUM mmol/L 3.7   CHLORIDE mmol/L 98   CO2 mmol/L 21.8*   BUN mg/dL 42*   CREATININE mg/dL 1.82*   CALCIUM mg/dL 9.1   BILIRUBIN mg/dL 0.4   ALK PHOS U/L 104   ALT (SGPT) U/L <5   AST (SGOT) U/L 11   GLUCOSE mg/dL  "385*         Results from last 7 days   Lab Units 04/22/24  0703   MAGNESIUM mg/dL 1.5*         Cultures:  No results found for: \"BLOODCX\", \"URINECX\", \"WOUNDCX\", \"MRSACX\", \"RESPCX\", \"STOOLCX\"    I have reviewed daily medications and changes in CPOE    Scheduled meds  amLODIPine, 10 mg, Oral, Daily  atorvastatin, 80 mg, Oral, Nightly  bisoprolol, 5 mg, Oral, Daily  DULoxetine, 30 mg, Oral, Daily  HYDROcodone-acetaminophen, 1 tablet, Oral, TID  insulin glargine, 32 Units, Subcutaneous, Q12H  insulin lispro, 2-7 Units, Subcutaneous, 4x Daily AC & at Bedtime  insulin lispro, 5 Units, Subcutaneous, TID With Meals  levothyroxine, 112 mcg, Oral, Once per day on Monday Tuesday Wednesday Thursday Friday Saturday  levothyroxine, 168 mcg, Oral, Every Sunday  pantoprazole, 40 mg, Oral, Q AM  pregabalin, 50 mg, Oral, Daily  sodium chloride, 10 mL, Intravenous, Q12H  torsemide, 40 mg, Oral, Daily  cyanocobalamin, 1,000 mcg, Oral, Daily             PRN meds    senna-docusate sodium **AND** polyethylene glycol **AND** bisacodyl **AND** bisacodyl    dextrose    dextrose    glucagon (human recombinant)    ondansetron    sodium chloride    sodium chloride        Uncontrolled diabetes mellitus with hyperglycemia    Primary hypothyroidism    VIPUL (acute kidney injury)    Hypertensive kidney disease with stage 3a chronic kidney disease    HTN (hypertension)    Pseudohyponatremia        Assessment/Plan:    Type 2 diabetes  -Blood sugars initially 821 on arrival with a normal anion gap and normal bicarbonate  -Patient given 32 units of Lantus and 10 units regular insulin and blood sugars are now 400s  -Continue with sliding scale insulin  -Restarting home regimen 25 units twice daily along with 5 units with meals  -Diabetes education  -Patient tolerating oral intake    Acute kidney injury on chronic kidney disease  -Baseline creatinine 1.3 creatinine on admission was 2.5, creatinine slightly improved  -fluids stopped  -started on " demedex    Pseudohyponatremia  -Recheck sodium    Hypertension  -Norvasc and bisoprolol  -BP is controlled    Neuroendocrine tumor  -followed by Dr Gutierrez  -she is due for octretide injection tomorrow, will ask if this can be done while in the hospital    Only able to do 2 steps      Nolan Dasilva MD  04/22/24  09:22 EDT

## 2024-04-22 NOTE — NURSING NOTE
CWON note: pt seen for evaluation of skin issues POA. Pt is alert and oriented, able to turn and reposition with some assistance, she is incontinent of bowel and bladder. Her sacral skin has blanchable erythema with 2 areas of pink partial thickness skin loss noted to right buttocks, related to combination of pressure/ friction/ and moisture. Continue with barrier cream, turning side to side. We have added a turning wedge to better off-load her sacrum and an accumax pump can be added as well.     She is noted to have yeast dermatitis under catarino breasts, worst under the left breast. I have ordered Magic barrier cream to be applied BID with pillow cases placed in between skin folds to wick moisture.     Catarino legs are dry and flaky with some scattered erythema. Heels have blanchable erythema. I have ordered amlactin for the dry skin, venelex ointment for her heels with heel boots to off-load her heels. Wound care and prevention standing orders have been placed [facility]. Please re-consult for any additional needs.

## 2024-04-22 NOTE — PLAN OF CARE
Goal Outcome Evaluation:  Plan of Care Reviewed With: patient        Progress: improving  Outcome Evaluation: vss, pain controlled by scheduled pain meds. purewick in place. pt rested well during shift. pt turned on own. labs in am. IVF infusing.

## 2024-04-22 NOTE — PROGRESS NOTES
[facility]\"" Progress Note      Patient Name: Halie Guidry  : 1940  MRN: [id number]  Primary Care Physician:  Napoleon Mead MD  Date of admission: 2024    Subjective     Interval History:   Follow-up acute kidney injury on chronic kidney disease    Patient is feeling better, denies any chest pain or shortness of air, no orthopnea or PND, no nausea or vomiting, no abdominal pain.  No dysuria or gross hematuria.    Review of Systems:   As noted above    Objective     Vitals:   Temp:  [97.7 °F (36.5 °C)-98.6 °F (37 °C)] 97.7 °F (36.5 °C)  Heart Rate:  [59-84] 63  Resp:  [16-17] 16  BP: (104-196)/(42-94) 196/63  Flow (L/min):  [1] 1    Intake/Output Summary (Last 24 hours) at 2024 0856  Last data filed at 2024 0500  Gross per 24 hour   Intake 480 ml   Output 1550 ml   Net -1070 ml       Physical Exam:    General Appearance: alert, awake, chronically ill, no acute distress   Skin: warm and dry  HEENT: oral mucosa normal, nonicteric sclera  Neck: supple, no JVD  Lungs: CTA, unlabored breathing effort  Heart: RRR, normal S1 and S2  Abdomen: soft, nontender, nondistended  : no palpable bladder  Extremities: Chronic stasis dermatitis skin wrinkling and she has mycotic toenails, she has 1+ pitting edema  Neuro: normal speech and mental status     Scheduled Meds:     amLODIPine, 10 mg, Oral, Daily  atorvastatin, 80 mg, Oral, Nightly  bisoprolol, 5 mg, Oral, Daily  DULoxetine, 30 mg, Oral, Daily  HYDROcodone-acetaminophen, 1 tablet, Oral, TID  insulin glargine, 25 Units, Subcutaneous, Q12H  insulin lispro, 2-7 Units, Subcutaneous, 4x Daily AC & at Bedtime  insulin lispro, 5 Units, Subcutaneous, TID With Meals  levothyroxine, 112 mcg, Oral, Once per day on   levothyroxine, 168 mcg, Oral, Every   pantoprazole, 40 mg, Oral, Q AM  pregabalin, 50 mg, Oral, Daily  sodium chloride, 10 mL, Intravenous,  Q12H  cyanocobalamin, 1,000 mcg, Oral, Daily      IV Meds:   lactated ringers, 100 mL/hr, Last Rate: 100 mL/hr (04/22/24 0146)        Results Reviewed:   I have personally reviewed the results from the time of this admission to 4/22/2024 08:56 EDT     Results from last 7 days   Lab Units 04/22/24  0703 04/21/24  1209 04/21/24  0227 04/20/24 2050   SODIUM mmol/L 133* 131*  --  123*   POTASSIUM mmol/L 3.7 3.3*  --  4.1   CHLORIDE mmol/L 98 94*  --  84*   CO2 mmol/L 21.8* 24.0  --  24.0   BUN mg/dL 42* 41*  --  46*   CREATININE mg/dL 1.82* 2.36*  --  2.50*   CALCIUM mg/dL 9.1 9.1  --  9.2   BILIRUBIN mg/dL 0.4 0.4  --  0.6   ALK PHOS U/L 104 117  --  141*   ALT (SGPT) U/L <5 8  --  10   AST (SGOT) U/L 11 9  --  7   GLUCOSE mg/dL 385* 261* 555* 821*       Estimated Creatinine Clearance: 26.1 mL/min (A) (by C-G formula based on SCr of 1.82 mg/dL (H)).    Results from last 7 days   Lab Units 04/22/24  0703   MAGNESIUM mg/dL 1.5*   PHOSPHORUS mg/dL 3.3       Results from last 7 days   Lab Units 04/22/24  0703   URIC ACID mg/dL 8.4*       Results from last 7 days   Lab Units 04/22/24  0703 04/20/24 2050   WBC 10*3/mm3 8.76 9.75   HEMOGLOBIN g/dL 11.6* 13.5   PLATELETS 10*3/mm3 252 299             Assessment / Plan     ASSESSMENT:  Acute kidney injury associated with volume depletion and inability to autoregulate since the patient was taking ACE inhibitor renal function improving since admission creatinine down to 1.8 to sodium is slowly improving up to 133.  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  Discussed the case with the patient and she voiced good understanding    Thank you for involving us in the care of Halie Guidry.  Please feel free to call with any questions.    Chidi Lanier MD  04/22/24  08:56 EDT    [facility]  [phone]    Please note that portions of this note were completed with a voice recognition program.

## 2024-04-22 NOTE — NURSING NOTE
"Diabetes Education  Assessment/Teaching    Patient Name:  Halie Guidry  YOB: 1940  MRN: [id number]  Admit Date:  4/20/2024      Assessment Date:  4/22/2024  Flowsheet Row Most Recent Value   General Information     Referral From: MD gilbert/Dr Mccormick. Meet with pt at bedside on 6S to assess needs for DM ed.    Height 170.2 cm (67\")   Height Method Estimated   Weight 84.1 kg (185 lb 6.5 oz)   Weight Method Estimated   Diabetes History    What type of diabetes do you have? Type 2   Length of Diabetes Diagnosis -- ~19 yr hx.   Do you test your blood sugar at home? yes   Have you had high blood sugar? (>140mg/dl) Yes- recent A1c 14.2%.   Do you have any diabetes complications? nephropathy   Education Preferences    Barriers to Learning -- emotional. pt endorses depression sxs in the days leading up to admission. pt explains she called to get a refill for Humalog and was unable. she reports feeling \"defeated.\"   Nutrition Information Pt states she is paying someone to come in (~2/wk) who helps pt w/laundry, meals.    Assessment Topics    Taking Medication - Assessment Needs education -pt reports she was unable to get Humalog refilled. pta, pt also dc'd Toujeo though she does have this at home. (\"I got careless\")   Healthy Coping - Assessment Needs education   DM Goals    Contact Plan Follow-up medical care with PCP.            Flowsheet Row Most Recent Value   DM Education Needs    Meter Has own.   Medication -- -explain to pt we will plan to refill Humalog.   Problem Solving Hyperglycemia, Symptoms   Healthy Coping Depressed -provide active listening. Pt reports the passing of her dtr and also her best friend passed. Encourage pt to re-establish other friendships, social support.   Discharge Plan -- tbd. f/u with PCP.   Teaching Method Explanation, Discussion   Patient Response Needs reinforcement        Other Comments:  Addendum. Communicate w/primary RN my rec for  consult. This RN " initiated order for Humalog refill for dc. Provider needs to sign at med Rec process. Thank you.  Electronically signed by:  Merlyn Edwards RN  04/22/24 12:08 EDT

## 2024-04-23 LAB
ALBUMIN SERPL-MCNC: 3.4 G/DL (ref 3.5–5.2)
ALBUMIN/GLOB SERPL: 1.1 G/DL
ALP SERPL-CCNC: 97 U/L (ref 39–117)
ALT SERPL W P-5'-P-CCNC: 9 U/L (ref 1–33)
ANION GAP SERPL CALCULATED.3IONS-SCNC: 14 MMOL/L (ref 5–15)
AST SERPL-CCNC: 10 U/L (ref 1–32)
BACTERIA SPEC AEROBE CULT: NO GROWTH
BASOPHILS # BLD AUTO: 0.1 10*3/MM3 (ref 0–0.2)
BASOPHILS NFR BLD AUTO: 1.2 % (ref 0–1.5)
BILIRUB SERPL-MCNC: 0.3 MG/DL (ref 0–1.2)
BUN SERPL-MCNC: 37 MG/DL (ref 8–23)
BUN/CREAT SERPL: 20.6 (ref 7–25)
CALCIUM SPEC-SCNC: 8.6 MG/DL (ref 8.6–10.5)
CHLORIDE SERPL-SCNC: 97 MMOL/L (ref 98–107)
CO2 SERPL-SCNC: 23 MMOL/L (ref 22–29)
CREAT SERPL-MCNC: 1.8 MG/DL (ref 0.57–1)
DEPRECATED RDW RBC AUTO: 41.8 FL (ref 37–54)
EGFRCR SERPLBLD CKD-EPI 2021: 27.7 ML/MIN/1.73
EOSINOPHIL # BLD AUTO: 0.31 10*3/MM3 (ref 0–0.4)
EOSINOPHIL NFR BLD AUTO: 3.7 % (ref 0.3–6.2)
ERYTHROCYTE [DISTWIDTH] IN BLOOD BY AUTOMATED COUNT: 13.8 % (ref 12.3–15.4)
GLOBULIN UR ELPH-MCNC: 3.2 GM/DL
GLUCOSE BLDC GLUCOMTR-MCNC: 214 MG/DL (ref 70–130)
GLUCOSE BLDC GLUCOMTR-MCNC: 220 MG/DL (ref 70–130)
GLUCOSE BLDC GLUCOMTR-MCNC: 253 MG/DL (ref 70–130)
GLUCOSE BLDC GLUCOMTR-MCNC: 276 MG/DL (ref 70–130)
GLUCOSE BLDC GLUCOMTR-MCNC: 282 MG/DL (ref 70–130)
GLUCOSE BLDC GLUCOMTR-MCNC: 301 MG/DL (ref 70–130)
GLUCOSE SERPL-MCNC: 284 MG/DL (ref 65–99)
HCT VFR BLD AUTO: 36 % (ref 34–46.6)
HGB BLD-MCNC: 11.6 G/DL (ref 12–15.9)
IMM GRANULOCYTES # BLD AUTO: 0.03 10*3/MM3 (ref 0–0.05)
IMM GRANULOCYTES NFR BLD AUTO: 0.4 % (ref 0–0.5)
LYMPHOCYTES # BLD AUTO: 1.52 10*3/MM3 (ref 0.7–3.1)
LYMPHOCYTES NFR BLD AUTO: 18.1 % (ref 19.6–45.3)
MAGNESIUM SERPL-MCNC: 1.9 MG/DL (ref 1.6–2.4)
MCH RBC QN AUTO: 27.1 PG (ref 26.6–33)
MCHC RBC AUTO-ENTMCNC: 32.2 G/DL (ref 31.5–35.7)
MCV RBC AUTO: 84.1 FL (ref 79–97)
MONOCYTES # BLD AUTO: 0.63 10*3/MM3 (ref 0.1–0.9)
MONOCYTES NFR BLD AUTO: 7.5 % (ref 5–12)
NEUTROPHILS NFR BLD AUTO: 5.81 10*3/MM3 (ref 1.7–7)
NEUTROPHILS NFR BLD AUTO: 69.1 % (ref 42.7–76)
NRBC BLD AUTO-RTO: 0 /100 WBC (ref 0–0.2)
PHOSPHATE SERPL-MCNC: 3.7 MG/DL (ref 2.5–4.5)
PLATELET # BLD AUTO: 269 10*3/MM3 (ref 140–450)
PMV BLD AUTO: 10.5 FL (ref 6–12)
POTASSIUM SERPL-SCNC: 3.7 MMOL/L (ref 3.5–5.2)
PROT SERPL-MCNC: 6.6 G/DL (ref 6–8.5)
RBC # BLD AUTO: 4.28 10*6/MM3 (ref 3.77–5.28)
SODIUM SERPL-SCNC: 134 MMOL/L (ref 136–145)
URATE SERPL-MCNC: 8.9 MG/DL (ref 2.4–5.7)
WBC NRBC COR # BLD AUTO: 8.4 10*3/MM3 (ref 3.4–10.8)

## 2024-04-23 PROCEDURE — 84100 ASSAY OF PHOSPHORUS: CPT | Performed by: INTERNAL MEDICINE

## 2024-04-23 PROCEDURE — 97530 THERAPEUTIC ACTIVITIES: CPT

## 2024-04-23 PROCEDURE — 63710000001 INSULIN LISPRO (HUMAN) PER 5 UNITS: Performed by: HOSPITALIST

## 2024-04-23 PROCEDURE — 63710000001 INSULIN GLARGINE PER 5 UNITS: Performed by: HOSPITALIST

## 2024-04-23 PROCEDURE — 25010000002 ENOXAPARIN PER 10 MG: Performed by: HOSPITALIST

## 2024-04-23 PROCEDURE — 82948 REAGENT STRIP/BLOOD GLUCOSE: CPT

## 2024-04-23 PROCEDURE — 84550 ASSAY OF BLOOD/URIC ACID: CPT | Performed by: INTERNAL MEDICINE

## 2024-04-23 PROCEDURE — 80053 COMPREHEN METABOLIC PANEL: CPT | Performed by: HOSPITALIST

## 2024-04-23 PROCEDURE — 85025 COMPLETE CBC W/AUTO DIFF WBC: CPT | Performed by: STUDENT IN AN ORGANIZED HEALTH CARE EDUCATION/TRAINING PROGRAM

## 2024-04-23 PROCEDURE — 63710000001 INSULIN LISPRO (HUMAN) PER 5 UNITS: Performed by: STUDENT IN AN ORGANIZED HEALTH CARE EDUCATION/TRAINING PROGRAM

## 2024-04-23 PROCEDURE — 83735 ASSAY OF MAGNESIUM: CPT | Performed by: HOSPITALIST

## 2024-04-23 RX ORDER — PREGABALIN 50 MG/1
50 CAPSULE ORAL 2 TIMES DAILY
Status: DISCONTINUED | OUTPATIENT
Start: 2024-04-23 | End: 2024-04-26

## 2024-04-23 RX ORDER — ENOXAPARIN SODIUM 100 MG/ML
30 INJECTION SUBCUTANEOUS NIGHTLY
Status: DISCONTINUED | OUTPATIENT
Start: 2024-04-23 | End: 2024-05-01 | Stop reason: HOSPADM

## 2024-04-23 RX ORDER — OLANZAPINE 5 MG/1
5 TABLET, ORALLY DISINTEGRATING ORAL EVERY 8 HOURS PRN
Status: DISCONTINUED | OUTPATIENT
Start: 2024-04-23 | End: 2024-05-01 | Stop reason: HOSPADM

## 2024-04-23 RX ADMIN — ENOXAPARIN SODIUM 30 MG: 100 INJECTION SUBCUTANEOUS at 20:15

## 2024-04-23 RX ADMIN — Medication 10 ML: at 20:18

## 2024-04-23 RX ADMIN — Medication 10 ML: at 08:11

## 2024-04-23 RX ADMIN — PANTOPRAZOLE SODIUM 40 MG: 40 TABLET, DELAYED RELEASE ORAL at 05:53

## 2024-04-23 RX ADMIN — HYDROCODONE BITARTRATE AND ACETAMINOPHEN 1 TABLET: 10; 325 TABLET ORAL at 15:12

## 2024-04-23 RX ADMIN — INSULIN LISPRO 4 UNITS: 100 INJECTION, SOLUTION INTRAVENOUS; SUBCUTANEOUS at 08:11

## 2024-04-23 RX ADMIN — TORSEMIDE 40 MG: 20 TABLET ORAL at 08:10

## 2024-04-23 RX ADMIN — BISOPROLOL FUMARATE 5 MG: 5 TABLET, FILM COATED ORAL at 08:10

## 2024-04-23 RX ADMIN — INSULIN GLARGINE 32 UNITS: 100 INJECTION, SOLUTION SUBCUTANEOUS at 20:17

## 2024-04-23 RX ADMIN — CASTOR OIL AND BALSAM, PERU 1 APPLICATION: 788; 87 OINTMENT TOPICAL at 08:10

## 2024-04-23 RX ADMIN — ZINC OXIDE 1 APPLICATION: 200 OINTMENT TOPICAL at 20:18

## 2024-04-23 RX ADMIN — INSULIN LISPRO 10 UNITS: 100 INJECTION, SOLUTION INTRAVENOUS; SUBCUTANEOUS at 08:10

## 2024-04-23 RX ADMIN — Medication 1000 MCG: at 08:10

## 2024-04-23 RX ADMIN — INSULIN LISPRO 3 UNITS: 100 INJECTION, SOLUTION INTRAVENOUS; SUBCUTANEOUS at 17:31

## 2024-04-23 RX ADMIN — LEVOTHYROXINE SODIUM 112 MCG: 112 TABLET ORAL at 05:53

## 2024-04-23 RX ADMIN — INSULIN LISPRO 10 UNITS: 100 INJECTION, SOLUTION INTRAVENOUS; SUBCUTANEOUS at 17:30

## 2024-04-23 RX ADMIN — PREGABALIN 50 MG: 50 CAPSULE ORAL at 20:15

## 2024-04-23 RX ADMIN — DULOXETINE HYDROCHLORIDE 30 MG: 30 CAPSULE, DELAYED RELEASE ORAL at 08:10

## 2024-04-23 RX ADMIN — HYDROCODONE BITARTRATE AND ACETAMINOPHEN 1 TABLET: 10; 325 TABLET ORAL at 08:10

## 2024-04-23 RX ADMIN — INSULIN LISPRO 3 UNITS: 100 INJECTION, SOLUTION INTRAVENOUS; SUBCUTANEOUS at 12:06

## 2024-04-23 RX ADMIN — CASTOR OIL AND BALSAM, PERU 1 APPLICATION: 788; 87 OINTMENT TOPICAL at 20:16

## 2024-04-23 RX ADMIN — INSULIN LISPRO 4 UNITS: 100 INJECTION, SOLUTION INTRAVENOUS; SUBCUTANEOUS at 20:18

## 2024-04-23 RX ADMIN — LIDOCAINE 1 APPLICATION: 4 CREAM TOPICAL at 08:10

## 2024-04-23 RX ADMIN — INSULIN LISPRO 10 UNITS: 100 INJECTION, SOLUTION INTRAVENOUS; SUBCUTANEOUS at 12:05

## 2024-04-23 RX ADMIN — INSULIN GLARGINE 32 UNITS: 100 INJECTION, SOLUTION SUBCUTANEOUS at 08:10

## 2024-04-23 RX ADMIN — ZINC OXIDE 1 APPLICATION: 200 OINTMENT TOPICAL at 08:10

## 2024-04-23 RX ADMIN — PREGABALIN 50 MG: 50 CAPSULE ORAL at 08:09

## 2024-04-23 RX ADMIN — ATORVASTATIN CALCIUM 80 MG: 80 TABLET, FILM COATED ORAL at 20:15

## 2024-04-23 RX ADMIN — AMLODIPINE BESYLATE 10 MG: 10 TABLET ORAL at 08:09

## 2024-04-23 RX ADMIN — LIDOCAINE 1 APPLICATION: 4 CREAM TOPICAL at 20:18

## 2024-04-23 RX ADMIN — HYDROCODONE BITARTRATE AND ACETAMINOPHEN 1 TABLET: 10; 325 TABLET ORAL at 20:15

## 2024-04-23 NOTE — PROGRESS NOTES
[facility] Progress Note      Patient Name: Halie Guidry  : 1940  MRN: [id number]  Primary Care Physician:  Napoleon Mead MD  Date of admission: 2024    Subjective     Interval History:   Follow-up acute kidney injury on chronic kidney disease    Patient is feeling better, denies any chest pain or shortness of air, no orthopnea or PND, no nausea or vomiting, no abdominal pain.  No dysuria or gross hematuria.    Review of Systems:   As noted above    Objective     Vitals:   Temp:  [97.3 °F (36.3 °C)-98.4 °F (36.9 °C)] 97.5 °F (36.4 °C)  Heart Rate:  [59-67] 67  Resp:  [18] 18  BP: (113-164)/(61-81) 142/70  Flow (L/min):  [1.5-2] 1.5    Intake/Output Summary (Last 24 hours) at 2024 0901  Last data filed at 2024 0927  Gross per 24 hour   Intake 240 ml   Output 2600 ml   Net -2360 ml       Physical Exam:    General Appearance: alert, awake, chronically ill, no acute distress   Skin: warm and dry  HEENT: oral mucosa normal, nonicteric sclera  Neck: supple, no JVD  Lungs: CTA, unlabored breathing effort  Heart: RRR, normal S1 and S2  Abdomen: soft, nontender, nondistended  : no palpable bladder  Extremities: Chronic stasis dermatitis skin wrinkling and she has mycotic toenails, she has 1+ pitting edema  Neuro: normal speech and mental status     Scheduled Meds:     amLODIPine, 10 mg, Oral, Daily  ammonium lactate, 1 Application, Topical, BID  atorvastatin, 80 mg, Oral, Nightly  bisoprolol, 5 mg, Oral, Daily  castor oil-balsam peru, 1 Application, Topical, Q12H  DULoxetine, 30 mg, Oral, Daily  HYDROcodone-acetaminophen, 1 tablet, Oral, TID  hydrocortisone-bacitracin-zinc oxide-nystatin, 1 Application, Topical, BID  insulin glargine, 32 Units, Subcutaneous, Q12H  insulin lispro, 10 Units, Subcutaneous, TID With Meals  insulin lispro, 2-7 Units, Subcutaneous, 4x Daily AC & at Bedtime  levothyroxine, 112 mcg, Oral, Once per day on   Thursday Friday Saturday  levothyroxine, 168 mcg, Oral, Every Sunday  pantoprazole, 40 mg, Oral, Q AM  pregabalin, 50 mg, Oral, Daily  sodium chloride, 10 mL, Intravenous, Q12H  torsemide, 40 mg, Oral, Daily  cyanocobalamin, 1,000 mcg, Oral, Daily      IV Meds:          Results Reviewed:   I have personally reviewed the results from the time of this admission to 4/23/2024 09:38 EDT     Results from last 7 days   Lab Units 04/23/24  0547 04/22/24  0703 04/21/24  1209   SODIUM mmol/L 134* 133* 131*   POTASSIUM mmol/L 3.7 3.7 3.3*   CHLORIDE mmol/L 97* 98 94*   CO2 mmol/L 23.0 21.8* 24.0   BUN mg/dL 37* 42* 41*   CREATININE mg/dL 1.80* 1.82* 2.36*   CALCIUM mg/dL 8.6 9.1 9.1   BILIRUBIN mg/dL 0.3 0.4 0.4   ALK PHOS U/L 97 104 117   ALT (SGPT) U/L 9 <5 8   AST (SGOT) U/L 10 11 9   GLUCOSE mg/dL 284* 385* 261*       Estimated Creatinine Clearance: 26.4 mL/min (A) (by C-G formula based on SCr of 1.8 mg/dL (H)).    Results from last 7 days   Lab Units 04/23/24  0547 04/22/24  0703   MAGNESIUM mg/dL 1.9 1.5*   PHOSPHORUS mg/dL 3.7 3.3       Results from last 7 days   Lab Units 04/23/24  0547 04/22/24  0703   URIC ACID mg/dL 8.9* 8.4*       Results from last 7 days   Lab Units 04/23/24  0547 04/22/24  0703 04/20/24  2050   WBC 10*3/mm3 8.40 8.76 9.75   HEMOGLOBIN g/dL 11.6* 11.6* 13.5   PLATELETS 10*3/mm3 269 252 299             Assessment / Plan     ASSESSMENT:  Acute kidney injury associated with volume depletion and inability to autoregulate since the patient was taking ACE inhibitor renal function improving since admission creatinine down to 1.8 ,i slowly improving, sodium up to 134  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  Discussed the case with the patient and she voiced good understanding    Thank you for involving us in the care of Halie Guidry.  Please feel free to call with any questions.    Chidi Lanier MD  04/23/24  09:38 EDT    [facility]  [phone]    Please note that portions of this note were completed with a voice recognition program.

## 2024-04-23 NOTE — PLAN OF CARE
Goal Outcome Evaluation:  Plan of Care Reviewed With: patient        Progress: no change  Outcome Evaluation: VSS, pain controlled by scheduled pain meds, purewick in place. wound care completed. q2turn, heel boots in place. pt has stated she is haviung hallucinations and seeing people, but she knows they are not really. pt did sleep most of the shift.  labs in am.

## 2024-04-23 NOTE — PROGRESS NOTES
"[facility] Clinical Pharmacy Services: Enoxaparin Consult    Halie C Lorrainemary has a pharmacy consult to dose ppx enoxaparin per dr barron's request.     Indication: ppx  Home Anticoagulation: no     Relevant clinical data and objective history reviewed:  83 y.o. female 170.2 cm (67\") 84.1 kg (185 lb 6.5 oz)   Body mass index is 29.04 kg/m².   Results from last 7 days   Lab Units 04/23/24  0547   PLATELETS 10*3/mm3 269     Estimated Creatinine Clearance: 26.4 mL/min (A) (by C-G formula based on SCr of 1.8 mg/dL (H)).    Assessment/Plan    Will start patient on Lovenox 30 subcutaneous q24, adjusted for renal function.     Will follow since renal fxn is navdeep Gan [facility]  Clinical Pharmacist    "

## 2024-04-23 NOTE — PROGRESS NOTES
[facility]     LOS: 2 days     Subjective:    CC:Altered Mental Status    DIET:  Diet Order   Procedures    Diet: Diabetic; Consistent Carbohydrate; Fluid Consistency: Thin (IDDSI 0)   Not feeling well enough to work with PT yesterday  Tolerating PO  Feet and neuropathy is worse  Having more hallucinations    Objective:    Vital Signs:  Temp:  [97.3 °F (36.3 °C)-98.4 °F (36.9 °C)] 97.5 °F (36.4 °C)  Heart Rate:  [59-67] 67  Resp:  [18] 18  BP: (113-164)/(61-81) 142/70    SpO2:  [93 %-96 %] 96 %  on  Flow (L/min):  [1-2] 1;   Device (Oxygen Therapy): nasal cannula  Body mass index is 29.04 kg/m².    Physical Exam  Constitutional:       Appearance: Normal appearance.   HENT:      Head: Normocephalic and atraumatic.   Cardiovascular:      Rate and Rhythm: Normal rate and regular rhythm.      Heart sounds: No murmur heard.     No friction rub.   Pulmonary:      Effort: Pulmonary effort is normal.      Breath sounds: Normal breath sounds.   Abdominal:      General: Bowel sounds are normal. There is no distension.      Palpations: Abdomen is soft.      Tenderness: There is no abdominal tenderness.   Skin:     General: Skin is warm and dry.      Comments: Skin thickening of shins   Neurological:      Mental Status: She is alert.   Psychiatric:         Mood and Affect: Mood normal.         Behavior: Behavior normal.         Results Review:    Glucose   Date Value Ref Range Status   04/23/2024 284 (H) 65 - 99 mg/dL Final   04/22/2024 385 (H) 65 - 99 mg/dL Final   04/21/2024 261 (H) 65 - 99 mg/dL Final   04/21/2024 555 (C) 65 - 99 mg/dL Final   04/20/2024 821 (C) 65 - 99 mg/dL Final     Results from last 7 days   Lab Units 04/23/24  0547   WBC 10*3/mm3 8.40   HEMOGLOBIN g/dL 11.6*   HEMATOCRIT % 36.0   PLATELETS 10*3/mm3 269     Results from last 7 days   Lab Units 04/23/24  0547   SODIUM mmol/L 134*   POTASSIUM mmol/L 3.7   CHLORIDE mmol/L 97*   CO2 mmol/L 23.0   BUN mg/dL 37*   CREATININE mg/dL  "1.80*   CALCIUM mg/dL 8.6   BILIRUBIN mg/dL 0.3   ALK PHOS U/L 97   ALT (SGPT) U/L 9   AST (SGOT) U/L 10   GLUCOSE mg/dL 284*         Results from last 7 days   Lab Units 04/23/24  0547   MAGNESIUM mg/dL 1.9         Cultures:  No results found for: \"BLOODCX\", \"URINECX\", \"WOUNDCX\", \"MRSACX\", \"RESPCX\", \"STOOLCX\"    I have reviewed daily medications and changes in CPOE    Scheduled meds  amLODIPine, 10 mg, Oral, Daily  ammonium lactate, 1 Application, Topical, BID  atorvastatin, 80 mg, Oral, Nightly  bisoprolol, 5 mg, Oral, Daily  castor oil-balsam peru, 1 Application, Topical, Q12H  DULoxetine, 30 mg, Oral, Daily  HYDROcodone-acetaminophen, 1 tablet, Oral, TID  hydrocortisone-bacitracin-zinc oxide-nystatin, 1 Application, Topical, BID  insulin glargine, 32 Units, Subcutaneous, Q12H  insulin lispro, 10 Units, Subcutaneous, TID With Meals  insulin lispro, 2-7 Units, Subcutaneous, 4x Daily AC & at Bedtime  levothyroxine, 112 mcg, Oral, Once per day on Monday Tuesday Wednesday Thursday Friday Saturday  levothyroxine, 168 mcg, Oral, Every Sunday  pantoprazole, 40 mg, Oral, Q AM  pregabalin, 50 mg, Oral, Daily  sodium chloride, 10 mL, Intravenous, Q12H  torsemide, 40 mg, Oral, Daily  cyanocobalamin, 1,000 mcg, Oral, Daily             PRN meds    senna-docusate sodium **AND** polyethylene glycol **AND** bisacodyl **AND** bisacodyl    dextrose    dextrose    glucagon (human recombinant)    Magnesium Low Dose Replacement - Follow Nurse / BPA Driven Protocol    ondansetron    sodium chloride    sodium chloride        Uncontrolled diabetes mellitus with hyperglycemia    Primary hypothyroidism    VIPUL (acute kidney injury)    Hypertensive kidney disease with stage 3a chronic kidney disease    HTN (hypertension)    Pseudohyponatremia        Assessment/Plan:    Type 2 diabetes  -Blood sugars initially 821 on arrival with a normal anion gap and normal bicarbonate  -Continue with sliding scale insulin  -lantus 32 and novolog 10 " tid  -Diabetes education  -Patient tolerating oral intake    Acute kidney injury on chronic kidney disease  -Baseline creatinine 1.3 creatinine on admission was 2.5, creatinine slightly improved  -fluids stopped  -started on demedex    Pseudohyponatremia  -better    Hypertension  -Norvasc and bisoprolol  -BP is controlled    Neuroendocrine tumor  -followed by Dr Gutierrez  -she was due for octretide injection this past monday, will ask if this can be done while in the hospital    Neuropathy- she has been on lyrica 50 tid for a long time at home and getting 50 qday her, will increase the dose    Hallucinations  -psychiatry to see    Dvt ppx with low dose lovenox      Nolan Dasilva MD  04/23/24  12:07 EDT

## 2024-04-23 NOTE — PROGRESS NOTES
Continued Stay Note  [facility]     Patient Name: Halie Guidry  MRN: [id number]  Today's Date: 4/23/2024    Admit Date: 4/20/2024    [facility] would need [facility], vs. home with HH-will need referrals   Discharge Plan       Row Name 04/23/24 1436       Plan    Plan [facility] SNF referral pending, would need pre-cert, vs. home with HH-will need referrals    Plan Comments Spoke with [facility] , they are unable to accept.  Spoke with son Derrick by telephone.  He is aware that patient will need SNF at [place].  He states he attempts to assist patient but she does not allow him to and he feels she does not get the proper care she needs. He says she does not take her medicines correctly.  He would like referral to [facility] - spoke with Gayla and she will evaluate.  CCP will follow.  Connor MILLER                    Expected Discharge Date and Time       Expected Discharge Date Expected Discharge Time    Apr 24, 2024               Becky S. [place], RN

## 2024-04-23 NOTE — CONSULTS
"IDENTIFYING INFORMATION: Patient is an 83-year-old white female admitted with altered mental status.  She is seen by psychiatry related to complaints of visual hallucinations which she states take the form of \"food\".    CHIEF COMPLAINT: None given    INFORMANT: Patient and chart    RELIABILITY: Good    HISTORY OF PRESENT ILLNESS: Patient is an 83-year-old white female admitted with altered mental status.  The patient was increasingly confused but seems to have returned to her baseline mentation.  She does report that she has been experiencing visual hallucinations, specifically, food\" but is not experiencing any such phenomenon at this time.  The patient is pleasant and cooperative during interview.  She is fully oriented and denies any history of psychiatric treatment.  She has a history of hypertension, diabetes mellitus type 2 and hypothyroidism.  She is currently on Cymbalta 30 mg daily.  She denies recent changes in mood sleep or appetite and denies suicidal or homicidal ideations.  She denies any history of macular degeneration or glaucoma.    PAST PSYCHIATRIC HISTORY: Patient takes Cymbalta 30 mg daily    PAST MEDICAL HISTORY: See above    MEDICATIONS:   Current Facility-Administered Medications   Medication Dose Route Frequency Provider Last Rate Last Admin    amLODIPine (NORVASC) tablet 10 mg  10 mg Oral Daily Meri Koch APRN   10 mg at 04/23/24 0809    ammonium lactate (AMLACTIN) 12 % cream 1 Application  1 Application Topical BID Nolan Dasilva MD   1 Application at 04/23/24 0810    atorvastatin (LIPITOR) tablet 80 mg  80 mg Oral Nightly Meri Koch APRN   80 mg at 04/22/24 2048    sennosides-docusate (PERICOLACE) 8.6-50 MG per tablet 2 tablet  2 tablet Oral BID PRN Savita Mccormick MD        And    polyethylene glycol (MIRALAX) packet 17 g  17 g Oral Daily PRN Savita Mccormick MD        And    bisacodyl (DULCOLAX) EC tablet 5 mg  5 mg Oral Daily PRN Savita Mccormick MD        And    " bisacodyl (DULCOLAX) suppository 10 mg  10 mg Rectal Daily PRN Savita Mccormick MD        bisoprolol (ZEBeta) tablet 5 mg  5 mg Oral Daily Meri Koch APRN   5 mg at 04/23/24 0810    castor oil-balsam peru (VENELEX) ointment 1 Application  1 Application Topical Q12H Nolan Dasilva MD   1 Application at 04/23/24 0810    dextrose (D50W) (25 g/50 mL) IV injection 25 g  25 g Intravenous Q15 Min PRN Savita Mccormick MD        dextrose (GLUTOSE) oral gel 15 g  15 g Oral Q15 Min PRN Savita Mccormick MD        DULoxetine (CYMBALTA) DR capsule 30 mg  30 mg Oral Daily Meri Koch APRN   30 mg at 04/23/24 0810    Enoxaparin Sodium (LOVENOX) syringe 30 mg  30 mg Subcutaneous Nightly Nolan Dasilva MD        glucagon (GLUCAGEN) injection 1 mg  1 mg Intramuscular Q15 Min PRN Savita Mccormick MD        HYDROcodone-acetaminophen (NORCO)  MG per tablet 1 tablet  1 tablet Oral TID Meri Koch APRN   1 tablet at 04/23/24 0810    hydrocortisone-bacitracin-zinc oxide-nystatin (MAGIC BARRIER) ointment 1 Application  1 Application Topical BID Nolan Dasilva MD   1 Application at 04/23/24 0810    insulin glargine (LANTUS, SEMGLEE) injection 32 Units  32 Units Subcutaneous Q12H Nolan Dasilva MD   32 Units at 04/23/24 0810    insulin lispro (HUMALOG/ADMELOG) injection 10 Units  10 Units Subcutaneous TID With Meals Nolan Dasilva MD   10 Units at 04/23/24 1205    insulin lispro (HUMALOG/ADMELOG) injection 2-7 Units  2-7 Units Subcutaneous 4x Daily AC & at Bedtime Savita Mccormick MD   3 Units at 04/23/24 1206    levothyroxine (SYNTHROID, LEVOTHROID) tablet 112 mcg  112 mcg Oral Once per day on Monday Tuesday Wednesday Thursday Friday Saturday Meri Koch APRN   112 mcg at 04/23/24 0553    levothyroxine (SYNTHROID, LEVOTHROID) tablet 168 mcg  168 mcg Oral Every Sunday Meri Koch, SERGE   168 mcg at 04/21/24 0814    Magnesium Low Dose Replacement - Follow Nurse / BPA Driven  Protocol   Does not apply PRN Chidi Lanier MD        OLANZapine zydis (zyPREXA) disintegrating tablet 5 mg  5 mg Oral Q8H PRN Bakari Albert III, MD        ondansetron (ZOFRAN) injection 4 mg  4 mg Intravenous Q6H PRN Savita Mccormick MD        pantoprazole (PROTONIX) EC tablet 40 mg  40 mg Oral Q AM Meri Koch APRN   40 mg at 04/23/24 0553    Pharmacy to Dose enoxaparin (LOVENOX)   Does not apply Continuous PRN Nolan Dasilva MD        pregabalin (LYRICA) capsule 50 mg  50 mg Oral BID Nolan Dasilva MD        sodium chloride 0.9 % flush 10 mL  10 mL Intravenous Q12H Saivta Mccormick MD   10 mL at 04/23/24 0811    sodium chloride 0.9 % flush 10 mL  10 mL Intravenous PRN Savita Mccormick MD        sodium chloride 0.9 % infusion 40 mL  40 mL Intravenous PRN Savita Mccormick MD        torsemide (DEMADEX) tablet 40 mg  40 mg Oral Daily Chidi Lanier MD   40 mg at 04/23/24 0810    vitamin B-12 (CYANOCOBALAMIN) tablet 1,000 mcg  1,000 mcg Oral Daily Meri Koch APRN   1,000 mcg at 04/23/24 0810         ALLERGIES: Sulfa    FAMILY HISTORY: Noncontributory    SOCIAL HISTORY: No reported use of alcohol tobaccos or street drugs    MENTAL STATUS EXAM: The patient is an obese white female appearing stated age.  She has no apparent physical distress at the time of examination.  She is awake alert and oriented spheres.  Her mood is euthymic her affect congruent.  Speech is relevant and coherent.  There were no deficits memory or cognition noted.  Intelligence is judged to be in the average range based on fund of knowledge, the patient is cooperative with interview.  She denies current suicidal or homicidal ideations or psychotic features.  Judgment insight are intact.    ASSETS/LIABILITIES: To be assessed    DIAGNOSTIC IMPRESSION: Metabolic encephalopathy resolving, medical problems as described previously    PLAN: I have left orders for as needed Zydis to be given if the patient  experiences further visual hallucinations.  She denies any history of macular degeneration or glaucoma which would rule out a Bakari Te syndrome.  I believe that the hallucinations should resolve as her medical issues resolved.  Will continue to follow with you.

## 2024-04-24 LAB
ALBUMIN SERPL-MCNC: 3.4 G/DL (ref 3.5–5.2)
ALBUMIN/GLOB SERPL: 1.1 G/DL
ALP SERPL-CCNC: 90 U/L (ref 39–117)
ALT SERPL W P-5'-P-CCNC: 9 U/L (ref 1–33)
ANION GAP SERPL CALCULATED.3IONS-SCNC: 13 MMOL/L (ref 5–15)
AST SERPL-CCNC: 10 U/L (ref 1–32)
BASOPHILS # BLD AUTO: 0.13 10*3/MM3 (ref 0–0.2)
BASOPHILS NFR BLD AUTO: 1.5 % (ref 0–1.5)
BILIRUB SERPL-MCNC: 0.3 MG/DL (ref 0–1.2)
BUN SERPL-MCNC: 44 MG/DL (ref 8–23)
BUN/CREAT SERPL: 21.6 (ref 7–25)
CALCIUM SPEC-SCNC: 8.3 MG/DL (ref 8.6–10.5)
CHLORIDE SERPL-SCNC: 98 MMOL/L (ref 98–107)
CO2 SERPL-SCNC: 25 MMOL/L (ref 22–29)
CREAT SERPL-MCNC: 2.04 MG/DL (ref 0.57–1)
DEPRECATED RDW RBC AUTO: 43.1 FL (ref 37–54)
EGFRCR SERPLBLD CKD-EPI 2021: 23.8 ML/MIN/1.73
EOSINOPHIL # BLD AUTO: 0.28 10*3/MM3 (ref 0–0.4)
EOSINOPHIL NFR BLD AUTO: 3.3 % (ref 0.3–6.2)
ERYTHROCYTE [DISTWIDTH] IN BLOOD BY AUTOMATED COUNT: 13.8 % (ref 12.3–15.4)
GLOBULIN UR ELPH-MCNC: 3.2 GM/DL
GLUCOSE BLDC GLUCOMTR-MCNC: 209 MG/DL (ref 70–130)
GLUCOSE BLDC GLUCOMTR-MCNC: 219 MG/DL (ref 70–130)
GLUCOSE BLDC GLUCOMTR-MCNC: 283 MG/DL (ref 70–130)
GLUCOSE BLDC GLUCOMTR-MCNC: 369 MG/DL (ref 70–130)
GLUCOSE SERPL-MCNC: 279 MG/DL (ref 65–99)
HCT VFR BLD AUTO: 34.4 % (ref 34–46.6)
HGB BLD-MCNC: 11.1 G/DL (ref 12–15.9)
IMM GRANULOCYTES # BLD AUTO: 0.05 10*3/MM3 (ref 0–0.05)
IMM GRANULOCYTES NFR BLD AUTO: 0.6 % (ref 0–0.5)
LYMPHOCYTES # BLD AUTO: 1.53 10*3/MM3 (ref 0.7–3.1)
LYMPHOCYTES NFR BLD AUTO: 18.2 % (ref 19.6–45.3)
MAGNESIUM SERPL-MCNC: 1.5 MG/DL (ref 1.6–2.4)
MCH RBC QN AUTO: 27.4 PG (ref 26.6–33)
MCHC RBC AUTO-ENTMCNC: 32.3 G/DL (ref 31.5–35.7)
MCV RBC AUTO: 84.9 FL (ref 79–97)
MONOCYTES # BLD AUTO: 0.73 10*3/MM3 (ref 0.1–0.9)
MONOCYTES NFR BLD AUTO: 8.7 % (ref 5–12)
NEUTROPHILS NFR BLD AUTO: 5.68 10*3/MM3 (ref 1.7–7)
NEUTROPHILS NFR BLD AUTO: 67.7 % (ref 42.7–76)
NRBC BLD AUTO-RTO: 0 /100 WBC (ref 0–0.2)
PHOSPHATE SERPL-MCNC: 4.8 MG/DL (ref 2.5–4.5)
PLATELET # BLD AUTO: 289 10*3/MM3 (ref 140–450)
PMV BLD AUTO: 10.2 FL (ref 6–12)
POTASSIUM SERPL-SCNC: 3.4 MMOL/L (ref 3.5–5.2)
PROT SERPL-MCNC: 6.6 G/DL (ref 6–8.5)
RBC # BLD AUTO: 4.05 10*6/MM3 (ref 3.77–5.28)
SODIUM SERPL-SCNC: 136 MMOL/L (ref 136–145)
URATE SERPL-MCNC: 9.5 MG/DL (ref 2.4–5.7)
WBC NRBC COR # BLD AUTO: 8.4 10*3/MM3 (ref 3.4–10.8)

## 2024-04-24 PROCEDURE — 80053 COMPREHEN METABOLIC PANEL: CPT | Performed by: HOSPITALIST

## 2024-04-24 PROCEDURE — 84100 ASSAY OF PHOSPHORUS: CPT | Performed by: INTERNAL MEDICINE

## 2024-04-24 PROCEDURE — 82948 REAGENT STRIP/BLOOD GLUCOSE: CPT

## 2024-04-24 PROCEDURE — 99222 1ST HOSP IP/OBS MODERATE 55: CPT | Performed by: INTERNAL MEDICINE

## 2024-04-24 PROCEDURE — 83735 ASSAY OF MAGNESIUM: CPT | Performed by: INTERNAL MEDICINE

## 2024-04-24 PROCEDURE — 85025 COMPLETE CBC W/AUTO DIFF WBC: CPT | Performed by: STUDENT IN AN ORGANIZED HEALTH CARE EDUCATION/TRAINING PROGRAM

## 2024-04-24 PROCEDURE — 63710000001 INSULIN LISPRO (HUMAN) PER 5 UNITS: Performed by: HOSPITALIST

## 2024-04-24 PROCEDURE — 63710000001 INSULIN LISPRO (HUMAN) PER 5 UNITS: Performed by: STUDENT IN AN ORGANIZED HEALTH CARE EDUCATION/TRAINING PROGRAM

## 2024-04-24 PROCEDURE — 63710000001 INSULIN GLARGINE PER 5 UNITS: Performed by: HOSPITALIST

## 2024-04-24 PROCEDURE — 25010000002 MAGNESIUM SULFATE IN D5W 1G/100ML (PREMIX) 1-5 GM/100ML-% SOLUTION: Performed by: HOSPITALIST

## 2024-04-24 PROCEDURE — 25010000002 ENOXAPARIN PER 10 MG: Performed by: HOSPITALIST

## 2024-04-24 PROCEDURE — 84550 ASSAY OF BLOOD/URIC ACID: CPT | Performed by: INTERNAL MEDICINE

## 2024-04-24 PROCEDURE — 97530 THERAPEUTIC ACTIVITIES: CPT

## 2024-04-24 RX ORDER — INSULIN LISPRO 100 [IU]/ML
12 INJECTION, SOLUTION INTRAVENOUS; SUBCUTANEOUS
Status: DISCONTINUED | OUTPATIENT
Start: 2024-04-25 | End: 2024-05-01 | Stop reason: HOSPADM

## 2024-04-24 RX ORDER — MAGNESIUM SULFATE 1 G/100ML
1 INJECTION INTRAVENOUS
Status: COMPLETED | OUTPATIENT
Start: 2024-04-24 | End: 2024-04-24

## 2024-04-24 RX ORDER — POTASSIUM CHLORIDE 750 MG/1
40 TABLET, FILM COATED, EXTENDED RELEASE ORAL ONCE
Status: COMPLETED | OUTPATIENT
Start: 2024-04-24 | End: 2024-04-24

## 2024-04-24 RX ADMIN — POTASSIUM CHLORIDE 40 MEQ: 750 TABLET, EXTENDED RELEASE ORAL at 11:29

## 2024-04-24 RX ADMIN — INSULIN LISPRO 3 UNITS: 100 INJECTION, SOLUTION INTRAVENOUS; SUBCUTANEOUS at 21:06

## 2024-04-24 RX ADMIN — INSULIN GLARGINE 32 UNITS: 100 INJECTION, SOLUTION SUBCUTANEOUS at 21:06

## 2024-04-24 RX ADMIN — ZINC OXIDE 1 APPLICATION: 200 OINTMENT TOPICAL at 21:07

## 2024-04-24 RX ADMIN — HYDROCODONE BITARTRATE AND ACETAMINOPHEN 1 TABLET: 10; 325 TABLET ORAL at 21:05

## 2024-04-24 RX ADMIN — INSULIN LISPRO 6 UNITS: 100 INJECTION, SOLUTION INTRAVENOUS; SUBCUTANEOUS at 11:30

## 2024-04-24 RX ADMIN — PANTOPRAZOLE SODIUM 40 MG: 40 TABLET, DELAYED RELEASE ORAL at 05:25

## 2024-04-24 RX ADMIN — MAGNESIUM SULFATE IN DEXTROSE 1 G: 10 INJECTION, SOLUTION INTRAVENOUS at 09:07

## 2024-04-24 RX ADMIN — INSULIN LISPRO 10 UNITS: 100 INJECTION, SOLUTION INTRAVENOUS; SUBCUTANEOUS at 16:47

## 2024-04-24 RX ADMIN — CASTOR OIL AND BALSAM, PERU 1 APPLICATION: 788; 87 OINTMENT TOPICAL at 21:07

## 2024-04-24 RX ADMIN — Medication 10 ML: at 21:07

## 2024-04-24 RX ADMIN — CASTOR OIL AND BALSAM, PERU 1 APPLICATION: 788; 87 OINTMENT TOPICAL at 08:06

## 2024-04-24 RX ADMIN — DULOXETINE HYDROCHLORIDE 30 MG: 30 CAPSULE, DELAYED RELEASE ORAL at 08:04

## 2024-04-24 RX ADMIN — LEVOTHYROXINE SODIUM 112 MCG: 112 TABLET ORAL at 05:25

## 2024-04-24 RX ADMIN — PREGABALIN 50 MG: 50 CAPSULE ORAL at 21:05

## 2024-04-24 RX ADMIN — INSULIN LISPRO 10 UNITS: 100 INJECTION, SOLUTION INTRAVENOUS; SUBCUTANEOUS at 08:05

## 2024-04-24 RX ADMIN — TORSEMIDE 40 MG: 20 TABLET ORAL at 08:05

## 2024-04-24 RX ADMIN — LIDOCAINE 1 APPLICATION: 4 CREAM TOPICAL at 08:06

## 2024-04-24 RX ADMIN — INSULIN LISPRO 10 UNITS: 100 INJECTION, SOLUTION INTRAVENOUS; SUBCUTANEOUS at 11:30

## 2024-04-24 RX ADMIN — Medication 1000 MCG: at 08:05

## 2024-04-24 RX ADMIN — MAGNESIUM SULFATE IN DEXTROSE 1 G: 10 INJECTION, SOLUTION INTRAVENOUS at 10:02

## 2024-04-24 RX ADMIN — OLANZAPINE 5 MG: 5 TABLET, ORALLY DISINTEGRATING ORAL at 21:10

## 2024-04-24 RX ADMIN — MAGNESIUM SULFATE IN DEXTROSE 1 G: 10 INJECTION, SOLUTION INTRAVENOUS at 08:06

## 2024-04-24 RX ADMIN — ENOXAPARIN SODIUM 30 MG: 100 INJECTION SUBCUTANEOUS at 21:05

## 2024-04-24 RX ADMIN — Medication 10 ML: at 08:06

## 2024-04-24 RX ADMIN — INSULIN GLARGINE 32 UNITS: 100 INJECTION, SOLUTION SUBCUTANEOUS at 08:05

## 2024-04-24 RX ADMIN — HYDROCODONE BITARTRATE AND ACETAMINOPHEN 1 TABLET: 10; 325 TABLET ORAL at 16:47

## 2024-04-24 RX ADMIN — PREGABALIN 50 MG: 50 CAPSULE ORAL at 08:05

## 2024-04-24 RX ADMIN — ZINC OXIDE 1 APPLICATION: 200 OINTMENT TOPICAL at 08:06

## 2024-04-24 RX ADMIN — LIDOCAINE 1 APPLICATION: 4 CREAM TOPICAL at 21:06

## 2024-04-24 RX ADMIN — HYDROCODONE BITARTRATE AND ACETAMINOPHEN 1 TABLET: 10; 325 TABLET ORAL at 08:05

## 2024-04-24 RX ADMIN — INSULIN LISPRO 3 UNITS: 100 INJECTION, SOLUTION INTRAVENOUS; SUBCUTANEOUS at 16:47

## 2024-04-24 RX ADMIN — ATORVASTATIN CALCIUM 80 MG: 80 TABLET, FILM COATED ORAL at 21:06

## 2024-04-24 RX ADMIN — INSULIN LISPRO 4 UNITS: 100 INJECTION, SOLUTION INTRAVENOUS; SUBCUTANEOUS at 08:05

## 2024-04-24 RX ADMIN — DOCUSATE SODIUM 50MG AND SENNOSIDES 8.6MG 2 TABLET: 8.6; 5 TABLET, FILM COATED ORAL at 11:29

## 2024-04-24 NOTE — PROGRESS NOTES
Continued Stay Note  [facility]     Patient Name: Halie Guidry  MRN: [id number]  Today's Date: 4/24/2024    Admit Date: 4/20/2024    Plan: -[facility] referral pending,will need pre-cert vs. home with    Discharge Plan       Row Name 04/24/24 1440       Plan    Plan -[facility] referral pending,will need pre-cert vs. home with HH    Plan Comments Per [facility] cannot accept patient if she is taking the Octretide.  During previous conversation, son did not feel she is geting adequate care at home.  States she does not allow him to assist her and he does not feel she is taking her meds properly.  Referral to [facility] pending.  [facility] continues to follow.  Connor MILLER                      Expected Discharge Date and Time       Expected Discharge Date Expected Discharge Time    Apr 25, 2024               Becky S. [place]eniuk, RN

## 2024-04-24 NOTE — PROGRESS NOTES
[facility] Progress Note      Patient Name: Halie Guidry  : 1940  MRN: [id number]  Primary Care Physician:  Napoleon Mead MD  Date of admission: 2024    Subjective     Interval History:   Follow-up acute kidney injury on chronic kidney disease    She stated that she is having increased lower extremity pain also having visual hallucination, denies any chest pain or shortness of air, no orthopnea or PND, no nausea or vomiting, no abdominal pain.  No dysuria or gross hematuria.    Review of Systems:   As noted above    Objective     Vitals:   Temp:  [97.7 °F (36.5 °C)-98.4 °F (36.9 °C)] 97.7 °F (36.5 °C)  Heart Rate:  [66-73] 66  Resp:  [18] 18  BP: (100-135)/(47-61) 122/47  Flow (L/min):  [1-2] 2    Intake/Output Summary (Last 24 hours) at 2024 0906  Last data filed at 2024 0518  Gross per 24 hour   Intake 810 ml   Output 1525 ml   Net -715 ml       Physical Exam:    General Appearance: alert, awake, chronically ill, no acute distress   Skin: warm and dry  HEENT: oral mucosa normal, nonicteric sclera  Neck: supple, no JVD  Lungs: CTA, unlabored breathing effort  Heart: RRR, normal S1 and S2  Abdomen: soft, nontender, nondistended  : no palpable bladder  Extremities: Chronic stasis dermatitis skin wrinkling and she has mycotic toenails, she has 1+ pitting edema  Neuro: normal speech and mental status     Scheduled Meds:     amLODIPine, 10 mg, Oral, Daily  ammonium lactate, 1 Application, Topical, BID  atorvastatin, 80 mg, Oral, Nightly  bisoprolol, 5 mg, Oral, Daily  castor oil-balsam peru, 1 Application, Topical, Q12H  DULoxetine, 30 mg, Oral, Daily  enoxaparin, 30 mg, Subcutaneous, Nightly  HYDROcodone-acetaminophen, 1 tablet, Oral, TID  hydrocortisone-bacitracin-zinc oxide-nystatin, 1 Application, Topical, BID  insulin glargine, 32 Units, Subcutaneous, Q12H  insulin lispro, 10 Units, Subcutaneous, TID With Meals  insulin lispro, 2-7 Units, Subcutaneous,  4x Daily AC & at Bedtime  levothyroxine, 112 mcg, Oral, Once per day on Monday Tuesday Wednesday Thursday Friday Saturday  levothyroxine, 168 mcg, Oral, Every Sunday  magnesium sulfate, 1 g, Intravenous, Q1H  pantoprazole, 40 mg, Oral, Q AM  pregabalin, 50 mg, Oral, BID  sodium chloride, 10 mL, Intravenous, Q12H  torsemide, 40 mg, Oral, Daily  cyanocobalamin, 1,000 mcg, Oral, Daily      IV Meds:   Pharmacy to Dose enoxaparin (LOVENOX),           Results Reviewed:   I have personally reviewed the results from the time of this admission to 4/24/2024 09:06 EDT     Results from last 7 days   Lab Units 04/24/24 0614 04/23/24  0547 04/22/24  0703   SODIUM mmol/L 136 134* 133*   POTASSIUM mmol/L 3.4* 3.7 3.7   CHLORIDE mmol/L 98 97* 98   CO2 mmol/L 25.0 23.0 21.8*   BUN mg/dL 44* 37* 42*   CREATININE mg/dL 2.04* 1.80* 1.82*   CALCIUM mg/dL 8.3* 8.6 9.1   BILIRUBIN mg/dL 0.3 0.3 0.4   ALK PHOS U/L 90 97 104   ALT (SGPT) U/L 9 9 <5   AST (SGOT) U/L 10 10 11   GLUCOSE mg/dL 279* 284* 385*       Estimated Creatinine Clearance: 23.3 mL/min (A) (by C-G formula based on SCr of 2.04 mg/dL (H)).    Results from last 7 days   Lab Units 04/24/24  0614 04/23/24  0547 04/22/24  0703   MAGNESIUM mg/dL 1.5* 1.9 1.5*   PHOSPHORUS mg/dL 4.8* 3.7 3.3       Results from last 7 days   Lab Units 04/24/24  0614 04/23/24  0547 04/22/24  0703   URIC ACID mg/dL 9.5* 8.9* 8.4*       Results from last 7 days   Lab Units 04/24/24  0614 04/23/24  0547 04/22/24  0703 04/20/24  2050   WBC 10*3/mm3 8.40 8.40 8.76 9.75   HEMOGLOBIN g/dL 11.1* 11.6* 11.6* 13.5   PLATELETS 10*3/mm3 289 269 252 299             Assessment / Plan     ASSESSMENT:  Acute kidney injury associated with volume depletion and inability to autoregulate since the patient was taking ACE inhibitor renal function improving since admission creatinine slightly increased to 2.04, potassium 3.4 and sodium 136, uric acid 9.5  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  Discussed the case with the patient and she voiced good understanding  Copied text in this note has been reviewed and is accurate as of 04/24/24.       Thank you for involving us in the care of Halie Guidry.  Please feel free to call with any questions.    Chidi Lanier MD  04/24/24  09:06 EDT    [facility]  [phone]    Please note that portions of this note were completed with a voice recognition program.

## 2024-04-24 NOTE — PLAN OF CARE
Goal Outcome Evaluation:      A/O x4, NSR on tele. Potassium and Magnesium replaced. Hem/onc consult re-called. VSS.

## 2024-04-24 NOTE — PROGRESS NOTES
"The patient continues to report visual hallucinations stating that when she turns her head to the left she sees \"a buffet of food\".  This is certainly an unusual symptom and I have attempted to normalize with the patient stating that a buffet of food is certainly not threatening in a way that a more frightening hallucination would be.  I have left in place as needed Zydis if the hallucinations remain problematic.  Continue to follow with you.  "

## 2024-04-24 NOTE — CONSULTS
.     REASON FOR CONSULTATION:   Pancreatic neuroendocrine tumor  Provide an opinion on any further workup or treatment                             REQUESTING PHYSICIAN: Franco Lazaro MD  RECORDS OBTAINED:  Records of the patient's history including those from the electronic medical record were reviewed and summarized in detail.    HISTORY OF PRESENT ILLNESS:  The patient is a 83 y.o. year old female  who is here for follow-up with the above-mentioned history.    Follows with Dr. Gutierrez in our office Last seen in our office by Merlyn Mon NP, 3/25/2024.  Pancreatic neuroendocrine tumor    Admitted through the ER on 4/20/2024 for altered mental status.  Son brought her in for confusion.  Had been out of insulin for several weeks.  Was severely hyperglycemic but not in DKA.Glucose was in the 800 range.  She was due for her octreotide injection on Monday, 4/22/2024.    Past Medical History:   Diagnosis Date    Acute metabolic encephalopathy 6/24/2022    Anxiety     Arthritis     Benign essential hypertension 08/20/2014    Bleeding disorder     Depression     Diabetes     Diabetes mellitus, type 2     Disc degeneration, lumbar     Headache, tension-type     Hyperlipidemia     Hypothyroidism     Neuropathy     Osteoporosis 09/09/2015    Peripheral neuropathy     Rotator cuff tear, left     Scoliosis     Shoulder pain     LEFT, TORN ROTATOR CUFF S/P FALL    Spinal stenosis      Past Surgical History:   Procedure Laterality Date    APPENDECTOMY      BILATERAL BREAST REDUCTION Bilateral 08/2015    CATARACT EXTRACTION  03/2015    CHOLECYSTECTOMY WITH INTRAOPERATIVE CHOLANGIOGRAM N/A 3/27/2022    Procedure: CHOLECYSTECTOMY LAPAROSCOPIC INTRAOPERATIVE CHOLANGIOGRAM;  Surgeon: Aiyana Hill MD;  Location: [facility];  Service: General;  Laterality: N/A;    COLONOSCOPY  06/05/2015    WNL    ERCP N/A 2/25/2022    Procedure: ENDOSCOPIC RETROGRADE CHOLANGIOPANCREATOGRAPHY with sphincterotomy and balloon sweep;   Surgeon: Chilo Wilhelm MD;  Location: [facility] ENDOSCOPY;  Service: Gastroenterology;  Laterality: N/A;  PRE/POST - CBD stones    ERCP N/A 3/28/2022    Procedure: ENDOSCOPIC RETROGRADE CHOLANGIOPANCREATOGRAPHY WITH SPHINCTEROTOMY AND BALLOON SWEEP;  Surgeon: Chilo Wilhelm MD;  Location: [facility] ENDOSCOPY;  Service: Gastroenterology;  Laterality: N/A;  PRE: COMMON DUCT STONE  POST: COMMON DUCT STONE    KYPHOPLASTY      REDUCTION MAMMAPLASTY      TONSILLECTOMY         MEDICATIONS    Current Facility-Administered Medications:     amLODIPine (NORVASC) tablet 10 mg, 10 mg, Oral, Daily, Meri Koch APRN, 10 mg at 04/23/24 0809    ammonium lactate (AMLACTIN) 12 % cream 1 Application, 1 Application, Topical, BID, Nolan Dasilva MD, 1 Application at 04/24/24 0806    atorvastatin (LIPITOR) tablet 80 mg, 80 mg, Oral, Nightly, Meri Koch APRN, 80 mg at 04/23/24 2015    sennosides-docusate (PERICOLACE) 8.6-50 MG per tablet 2 tablet, 2 tablet, Oral, BID PRN, 2 tablet at 04/24/24 1129 **AND** polyethylene glycol (MIRALAX) packet 17 g, 17 g, Oral, Daily PRN **AND** bisacodyl (DULCOLAX) EC tablet 5 mg, 5 mg, Oral, Daily PRN **AND** bisacodyl (DULCOLAX) suppository 10 mg, 10 mg, Rectal, Daily PRN, Savita Mccormick MD    bisoprolol (ZEBeta) tablet 5 mg, 5 mg, Oral, Daily, Meri Koch APRN, 5 mg at 04/23/24 0810    castor oil-balsam peru (VENELEX) ointment 1 Application, 1 Application, Topical, Q12H, Nolan Dasilva MD, 1 Application at 04/24/24 0806    dextrose (D50W) (25 g/50 mL) IV injection 25 g, 25 g, Intravenous, Q15 Min PRN, Savita Mccormick MD    dextrose (GLUTOSE) oral gel 15 g, 15 g, Oral, Q15 Min PRN, Savita Mccormick MD    DULoxetine (CYMBALTA) DR capsule 30 mg, 30 mg, Oral, Daily, Meri Koch APRN, 30 mg at 04/24/24 0804    Enoxaparin Sodium (LOVENOX) syringe 30 mg, 30 mg, Subcutaneous, Nightly, Nolan Dasilva MD, 30 mg at 04/23/24 2015    glucagon (GLUCAGEN)  injection 1 mg, 1 mg, Intramuscular, Q15 Min PRN, Savita Mccormick MD    HYDROcodone-acetaminophen (NORCO)  MG per tablet 1 tablet, 1 tablet, Oral, TID, Meri Koch APRN, 1 tablet at 04/24/24 0805    hydrocortisone-bacitracin-zinc oxide-nystatin (MAGIC BARRIER) ointment 1 Application, 1 Application, Topical, BID, Nolan Dasilva MD, 1 Application at 04/24/24 0806    insulin glargine (LANTUS, SEMGLEE) injection 32 Units, 32 Units, Subcutaneous, Q12H, Nolan Dasilva MD, 32 Units at 04/24/24 0805    insulin lispro (HUMALOG/ADMELOG) injection 10 Units, 10 Units, Subcutaneous, TID With Meals, Nolan Dasilva MD, 10 Units at 04/24/24 1130    insulin lispro (HUMALOG/ADMELOG) injection 2-7 Units, 2-7 Units, Subcutaneous, 4x Daily AC & at Bedtime, Savita Mccormick MD, 6 Units at 04/24/24 1130    levothyroxine (SYNTHROID, LEVOTHROID) tablet 112 mcg, 112 mcg, Oral, Once per day on Monday Tuesday Wednesday Thursday Friday Saturday, Meri Koch APRN, 112 mcg at 04/24/24 0525    levothyroxine (SYNTHROID, LEVOTHROID) tablet 168 mcg, 168 mcg, Oral, Every Sunday, Meri Koch APRN, 168 mcg at 04/21/24 0814    Magnesium Low Dose Replacement - Follow Nurse / BPA Driven Protocol, , Does not apply, PRN, Chidi Lanier MD    OLANZapine zydis (zyPREXA) disintegrating tablet 5 mg, 5 mg, Oral, Q8H PRN, Bakari Albert III, MD    ondansetron (ZOFRAN) injection 4 mg, 4 mg, Intravenous, Q6H PRN, Savita Mccormick MD    pantoprazole (PROTONIX) EC tablet 40 mg, 40 mg, Oral, Q AM, Meri Koch APRN, 40 mg at 04/24/24 0525    Pharmacy to Dose enoxaparin (LOVENOX), , Does not apply, Continuous PRN, Nolan Dasilva MD    pregabalin (LYRICA) capsule 50 mg, 50 mg, Oral, BID, Nolan Dasilva MD, 50 mg at 04/24/24 0805    sodium chloride 0.9 % flush 10 mL, 10 mL, Intravenous, Q12H, Savita Mccormick MD, 10 mL at 04/24/24 0806    sodium chloride 0.9 % flush 10 mL, 10 mL, Intravenous,  Jace BREWER Sarah, MD    sodium chloride 0.9 % infusion 40 mL, 40 mL, Intravenous, Jace BREWER Sarah, MD    torsemide (DEMADEX) tablet 40 mg, 40 mg, Oral, Daily, Chidi Lanier MD, 40 mg at 24 0805    vitamin B-12 (CYANOCOBALAMIN) tablet 1,000 mcg, 1,000 mcg, Oral, Daily, Meri Koch APRN, 1,000 mcg at 24 0805    ALLERGIES:     Allergies   Allergen Reactions    Sulfa Antibiotics Unknown - High Severity     Pt says it was a long time ago and I don't remember but it wasn't good.        SOCIAL HISTORY:       Social History     Socioeconomic History    Marital status:     Number of children: 3   Tobacco Use    Smoking status: Former     Current packs/day: 0.00     Average packs/day: 1 pack/day for 40.0 years (40.0 ttl pk-yrs)     Types: Cigarettes     Start date:      Quit date:      Years since quittin.3    Smokeless tobacco: Never   Vaping Use    Vaping status: Never Used   Substance and Sexual Activity    Alcohol use: No    Drug use: No    Sexual activity: Defer         FAMILY HISTORY:  Family History   Problem Relation Age of Onset    Bone cancer Mother     No Known Problems Father     Heart disease Daughter        REVIEW OF SYSTEMS:  Review of Systems   Constitutional:  Negative for activity change.   HENT:  Negative for nosebleeds and trouble swallowing.    Respiratory:  Negative for shortness of breath and wheezing.    Cardiovascular:  Negative for chest pain and palpitations.   Gastrointestinal:  Negative for constipation, diarrhea and nausea.   Genitourinary:  Negative for dysuria and hematuria.   Musculoskeletal:  Negative for arthralgias and myalgias.   Skin:  Negative for rash and wound.   Neurological:  Negative for seizures and syncope.   Hematological:  Negative for adenopathy. Does not bruise/bleed easily.   Psychiatric/Behavioral:  Negative for confusion.               Vitals:    24 2300 24 0715 24 1326   BP: 100/55  125/52 122/47 121/53   BP Location:  Left arm Left arm Left arm   Patient Position:  Lying Lying Sitting   Pulse: 73  66 75   Resp:  18 18 18   Temp: 98.1 °F (36.7 °C) 97.9 °F (36.6 °C) 97.7 °F (36.5 °C) 97.9 °F (36.6 °C)   TempSrc:  Oral Oral Oral   SpO2: 99% 91% 97% 93%   Weight:       Height:             3/25/2024     2:11 PM   Current Status   ECOG score 2      PHYSICAL EXAM:    CONSTITUTIONAL:  Vital signs reviewed.  No distress, looks comfortable.  EYES:  Conjunctivae and lids unremarkable.  PERRLA  EARS, NOSE, MOUTH, THROAT:  Ears and nose appear unremarkable.  Lips, teeth, gums appear unremarkable.  RESPIRATORY:  Normal respiratory effort.  Lungs clear to auscultation bilaterally.  CARDIOVASCULAR:  Normal S1, S2.  No murmurs, rubs or gallops.  No significant lower extremity edema.  GASTROINTESTINAL: Abdomen appears unremarkable.  Nontender.  No hepatomegaly.  No splenomegaly.  LYMPHATIC:  No cervical, supraclavicular, axillary lymphadenopathy.  NEURO: Cranial nerves 2-12 grossly intact.  No focal deficits.  Appears to have equal strength all 4 extremities.  MUSCULOSKELETAL:  Unremarkable digits/nails.  No cyanosis or clubbing.  No apparent joint deformities.  SKIN:  Warm.  No rashes.  PSYCHIATRIC:  Normal judgment and insight.  Normal mood and affect.     RECENT LABS:        WBC   Date Value Ref Range Status   04/24/2024 8.40 3.40 - 10.80 10*3/mm3 Final   04/23/2024 8.40 3.40 - 10.80 10*3/mm3 Final   04/22/2024 8.76 3.40 - 10.80 10*3/mm3 Final     Hemoglobin   Date Value Ref Range Status   04/24/2024 11.1 (L) 12.0 - 15.9 g/dL Final   04/23/2024 11.6 (L) 12.0 - 15.9 g/dL Final   04/22/2024 11.6 (L) 12.0 - 15.9 g/dL Final     Platelets   Date Value Ref Range Status   04/24/2024 289 140 - 450 10*3/mm3 Final   04/23/2024 269 140 - 450 10*3/mm3 Final   04/22/2024 252 140 - 450 10*3/mm3 Final       Assessment & Plan   Halie Guidry [unfilled]     *Well-differentiated pancreatic neuroendocrine tumor    Incidentally noted to have a hyperenhancing pancreatic head mass 3.6 cm with an adjacent 2.8 cm exophytic component or lymph node suspicious for NET radiographically by CT 7/26/2023  Laboratory studies showed a glucagon of 143, gastrin 236, somatostatin 21, CEA 3.09, CA 19-9 44.3, chromogranin A 2296, proinsulin 6.3, insulin 7.3, pancreatic polypeptide 643.6.  EGD on 8/4/2023 showing small hiatal hernia, erythema with erosions in the gastric antrum and body suggestive of erosive gastritis.  An endoscopic ultrasound was performed showing a pancreatic mass 3.5 x 3.0 cm in the pancreatic head and a second mass more circumscribed adjacently possibly communicating.  Fine-needle aspiration of the mass was performed showing a well-differentiated pancreatic neuroendocrine tumor  9/13/2023 dotatate scan-3.9 cm pancreatic head mass SUV 41.8 adjacent peripancreatic lymph node 2.1 cm SUV 38.4  9/13/2023-initiated octreotide 20 mg IM monthly; dose increased to 30 mg monthly 11/13/2023 12/11/2023 reports her diarrhea is better controlled since the dose increase on octreotide last month.  Did not receive octreotide from 12/11/2023 - 3/25/2024 due to multiple hospitalizations.  3/25/2024-resume octreotide.  Having 1-2 episodes of diarrhea daily.    Monthly octreotide injections are for treatment of her cancer.  I checked with the oncology pharmacist.  Octreotide cannot be given as an inpatient.     *Chronic diarrhea x1 year, hyperglycemia (but diabetic)     *Normocytic, normochromic anemia-hemoglobin 11.3 likely secondary to CKD 3   B12 572, folate 10.9, ferritin 32, iron sat 11%   Hemoglobin improved at 11.6.      *Comorbidities include venous insufficiency, hypertension, hyperlipidemia, CKD, hypothyroidism, advanced age-PS ECOG 2     *Weight gain/lower extremity edema after discontinuation of diuretics few months ago from hospitalization.  Improvement in weight gain and lower extremity edema since reinitiating Lasix 20 mg  daily on 11/13/2023.       *Hospitalized December 2023 for diabetic foot ulcer     *2 separate hospitalizations January 2024 related to C. difficile colitis.  Patient experienced VIPUL related to dehydration, improved with IV fluids.     *Social-patient is living in her condo.    Office note 3/25/2024: Son recently moved in since she is unable to care for herself after multiple hospital admissions.  He reports she is unable to perform any of her ADLs.  He is working full-time and having a difficult time managing her care on top of work.  She is having diarrhea 1-2 times daily and is incontinent.  Refuses to wear briefs.  They are both distressed about their current situation.  Request , Savita Bergeron, come and discuss options with them.  Will place referral for home health with PT/OT/nursing care.  She is also going to look into private care options for them.  4/24/24: Currently in the hospital.  I am told the plan is a long-term nursing facility.     Plan  Patient missed her appointment with Dr. Gutierrez on 4/22/2024 because she was hospitalized..  Follow-up will need to be arranged.  I asked our office to arrange an appointment with Dr. Gutierrez with an octreotide injection in 2 to 3 weeks.  As an outpatient, she needs to resume octreotide at 30 mg monthly IM

## 2024-04-24 NOTE — PLAN OF CARE
Goal Outcome Evaluation:           Progress: no change  Outcome Evaluation: Pain controlled with scheduled pain medication. 2L NC while sleeping. Wound care completed. Hallucinations continue. Pleasant and A&Ox4. ACHS and insulin per MAR. Awake most the night.

## 2024-04-24 NOTE — PLAN OF CARE
Goal Outcome Evaluation:  Plan of Care Reviewed With: patient           Outcome Evaluation: Pt agreed to PT session, suzanne bed mobility w/assist of 2, STS perf x3,MOD 2, pt able to take some side steps toward HOB, with cues to lock in knees to avoid knee buckling, pt plans SNU at [place]      Anticipated Discharge Disposition (PT): skilled nursing facility

## 2024-04-24 NOTE — THERAPY TREATMENT NOTE
Patient Name: Halie Guidry  : 1940    MRN: [id number]                              Today's Date: 2024       Admit Date: 2024    Visit Dx:     ICD-10-CM ICD-9-CM   1. Uncontrolled type 1 diabetes mellitus with hyperglycemia  E10.65 250.83     790.29   2. Acute renal failure, unspecified acute renal failure type  N17.9 584.9     Patient Active Problem List   Diagnosis    Compression fracture    Lumbar degenerative disc disease    Type 2 diabetes mellitus with hyperglycemia, with long-term current use of insulin    Hyperlipidemia    Benign essential hypertension    Primary hypothyroidism    Neuropathy    Osteoporosis    Proteinuria    Tobacco abuse    Diabetic eye exam    Generalized weakness    Spinal stenosis    Scoliosis    Arthritis    VBI (vertebrobasilar insufficiency)    Orthostatic hypotension    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    Severe hypothyroidism    Noncompliance with medication regimen    Vitamin D deficiency disease    Altered mental status    Tremor    Seizure    Stage 3a chronic kidney disease    Thoracic degenerative disc disease    Metabolic encephalopathy    VIPUL (acute kidney injury)    Hyperglycemia    Type II diabetes mellitus, uncontrolled    Lower abdominal pain    Transaminitis    UTI (urinary tract infection)    Emphysematous cystitis    Choledocholithiasis    Hypokalemia    Hypoxia    Generalized abdominal pain    History of Clostridium difficile infection    History of ERCP    Acute UTI (urinary tract infection)    Hypomagnesemia    Burning pain    Anxiety disorder    Neuropathic pain    Intertrigo    Weakness of both lower extremities    Accelerated hypertension    Acute cystitis without hematuria    Altered mental status, unspecified altered mental status type    Left lower lobe pneumonia    Acute pain of left foot    Cellulitis and abscess of left lower extremity    Hypertensive kidney disease with stage 3a chronic kidney disease     Bilateral leg pain    Peripheral edema    Primary malignant neuroendocrine tumor of pancreas    Encounter for long-term (current) use of high-risk medication    Diabetic foot ulcer    Acute renal insufficiency    C. difficile diarrhea    Sepsis    Stage 3a chronic kidney disease    Metabolic encephalopathy    Pressure injury of buttock, stage 2    Uncontrolled diabetes mellitus with hyperglycemia    HTN (hypertension)    Pseudohyponatremia     Past Medical History:   Diagnosis Date    Acute metabolic encephalopathy 6/24/2022    Anxiety     Arthritis     Benign essential hypertension 08/20/2014    Bleeding disorder     Depression     Diabetes     Diabetes mellitus, type 2     Disc degeneration, lumbar     Headache, tension-type     Hyperlipidemia     Hypothyroidism     Neuropathy     Osteoporosis 09/09/2015    Peripheral neuropathy     Rotator cuff tear, left     Scoliosis     Shoulder pain     LEFT, TORN ROTATOR CUFF S/P FALL    Spinal stenosis      Past Surgical History:   Procedure Laterality Date    APPENDECTOMY      BILATERAL BREAST REDUCTION Bilateral 08/2015    CATARACT EXTRACTION  03/2015    CHOLECYSTECTOMY WITH INTRAOPERATIVE CHOLANGIOGRAM N/A 3/27/2022    Procedure: CHOLECYSTECTOMY LAPAROSCOPIC INTRAOPERATIVE CHOLANGIOGRAM;  Surgeon: Aiyana Hill MD;  Location: [facility] OR;  Service: General;  Laterality: N/A;    COLONOSCOPY  06/05/2015    WNL    ERCP N/A 2/25/2022    Procedure: ENDOSCOPIC RETROGRADE CHOLANGIOPANCREATOGRAPHY with sphincterotomy and balloon sweep;  Surgeon: Chilo Wilhelm MD;  Location: [facility] ENDOSCOPY;  Service: Gastroenterology;  Laterality: N/A;  PRE/POST - CBD stones    ERCP N/A 3/28/2022    Procedure: ENDOSCOPIC RETROGRADE CHOLANGIOPANCREATOGRAPHY WITH SPHINCTEROTOMY AND BALLOON SWEEP;  Surgeon: Chilo Wilhelm MD;  Location: [facility] ENDOSCOPY;  Service: Gastroenterology;  Laterality: N/A;  PRE: COMMON DUCT STONE  POST: COMMON DUCT STONE    KYPHOPLASTY      REDUCTION  MAMMAPLASTY      TONSILLECTOMY        General Information    No documentation.                  Mobility    No documentation.                  Obj/Interventions    No documentation.                  Goals/Plan    No documentation.                  Clinical Impression    No documentation.                  Outcome Measures    No documentation.                                Physical Therapy Education       Title: PT OT SLP Therapies (In Progress)       Topic: Physical Therapy (In Progress)       Point: Mobility training (In Progress)       Learning Progress Summary             Patient Acceptance, E, NR by  at 4/21/2024 1513                         Point: Home exercise program (In Progress)       Learning Progress Summary             Patient Acceptance, E, NR by  at 4/21/2024 1513                                         User Key       Initials Effective Dates Name Provider Type Discipline     07/11/23 -  Helene Hernandez, PT Physical Therapist PT                  PT Recommendation and Plan     Plan of Care Reviewed With: patient  Outcome Evaluation: Pt agreed to PT session, suzanne bed mobility w/assist of 2, STS     Time Calculation:          Therapy Charges for Today       Code Description Service Date Service Provider Modifiers Qty    62735557179 HC PT THERAPEUTIC ACT EA 15 MIN 4/23/2024 Beena Ray, PTA GP 2    04653782452 HC PT THER SUPP EA 15 MIN 4/23/2024 Beena Ray, PTA GP 2    58027155189  PT THERAPEUTIC ACT EA 15 MIN 4/23/2024 Beena Ray, PTA GP 2            PT G-Codes  AM-PAC 6 Clicks Score (PT): 14       Beena Ray PTA  4/24/2024

## 2024-04-24 NOTE — THERAPY TREATMENT NOTE
Patient Name: Halie Guidry  : 1940    MRN: [id number]                              Today's Date: 2024       Admit Date: 2024    Visit Dx:     ICD-10-CM ICD-9-CM   1. Uncontrolled type 1 diabetes mellitus with hyperglycemia  E10.65 250.83     790.29   2. Acute renal failure, unspecified acute renal failure type  N17.9 584.9     Patient Active Problem List   Diagnosis    Compression fracture    Lumbar degenerative disc disease    Type 2 diabetes mellitus with hyperglycemia, with long-term current use of insulin    Hyperlipidemia    Benign essential hypertension    Primary hypothyroidism    Neuropathy    Osteoporosis    Proteinuria    Tobacco abuse    Diabetic eye exam    Generalized weakness    Spinal stenosis    Scoliosis    Arthritis    VBI (vertebrobasilar insufficiency)    Orthostatic hypotension    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    Severe hypothyroidism    Noncompliance with medication regimen    Vitamin D deficiency disease    Altered mental status    Tremor    Seizure    Stage 3a chronic kidney disease    Thoracic degenerative disc disease    Metabolic encephalopathy    VIPUL (acute kidney injury)    Hyperglycemia    Type II diabetes mellitus, uncontrolled    Lower abdominal pain    Transaminitis    UTI (urinary tract infection)    Emphysematous cystitis    Choledocholithiasis    Hypokalemia    Hypoxia    Generalized abdominal pain    History of Clostridium difficile infection    History of ERCP    Acute UTI (urinary tract infection)    Hypomagnesemia    Burning pain    Anxiety disorder    Neuropathic pain    Intertrigo    Weakness of both lower extremities    Accelerated hypertension    Acute cystitis without hematuria    Altered mental status, unspecified altered mental status type    Left lower lobe pneumonia    Acute pain of left foot    Cellulitis and abscess of left lower extremity    Hypertensive kidney disease with stage 3a chronic kidney disease     Bilateral leg pain    Peripheral edema    Primary malignant neuroendocrine tumor of pancreas    Encounter for long-term (current) use of high-risk medication    Diabetic foot ulcer    Acute renal insufficiency    C. difficile diarrhea    Sepsis    Stage 3a chronic kidney disease    Metabolic encephalopathy    Pressure injury of buttock, stage 2    Uncontrolled diabetes mellitus with hyperglycemia    HTN (hypertension)    Pseudohyponatremia     Past Medical History:   Diagnosis Date    Acute metabolic encephalopathy 6/24/2022    Anxiety     Arthritis     Benign essential hypertension 08/20/2014    Bleeding disorder     Depression     Diabetes     Diabetes mellitus, type 2     Disc degeneration, lumbar     Headache, tension-type     Hyperlipidemia     Hypothyroidism     Neuropathy     Osteoporosis 09/09/2015    Peripheral neuropathy     Rotator cuff tear, left     Scoliosis     Shoulder pain     LEFT, TORN ROTATOR CUFF S/P FALL    Spinal stenosis      Past Surgical History:   Procedure Laterality Date    APPENDECTOMY      BILATERAL BREAST REDUCTION Bilateral 08/2015    CATARACT EXTRACTION  03/2015    CHOLECYSTECTOMY WITH INTRAOPERATIVE CHOLANGIOGRAM N/A 3/27/2022    Procedure: CHOLECYSTECTOMY LAPAROSCOPIC INTRAOPERATIVE CHOLANGIOGRAM;  Surgeon: Aiyana Hill MD;  Location: [facility] OR;  Service: General;  Laterality: N/A;    COLONOSCOPY  06/05/2015    WNL    ERCP N/A 2/25/2022    Procedure: ENDOSCOPIC RETROGRADE CHOLANGIOPANCREATOGRAPHY with sphincterotomy and balloon sweep;  Surgeon: Chilo Wilhelm MD;  Location: [facility] ENDOSCOPY;  Service: Gastroenterology;  Laterality: N/A;  PRE/POST - CBD stones    ERCP N/A 3/28/2022    Procedure: ENDOSCOPIC RETROGRADE CHOLANGIOPANCREATOGRAPHY WITH SPHINCTEROTOMY AND BALLOON SWEEP;  Surgeon: Chilo Wilhelm MD;  Location: [facility] ENDOSCOPY;  Service: Gastroenterology;  Laterality: N/A;  PRE: COMMON DUCT STONE  POST: COMMON DUCT STONE    KYPHOPLASTY      REDUCTION  MAMMAPLASTY      TONSILLECTOMY        General Information       [facility] Name 04/24/24 1709          Physical Therapy Time and Intention    Document Type therapy note (daily note)  -     Mode of Treatment individual therapy;physical therapy  -JM       Row Name 04/24/24 1709          General Information    Patient Profile Reviewed yes  -     Existing Precautions/Restrictions fall  -[facility] Name 04/24/24 1709          Cognition    Orientation Status (Cognition) oriented x 3  seeing things not there in room, but is aware she is  -JM       Row Name 04/24/24 1709          Safety Issues, Functional Mobility    Impairments Affecting Function (Mobility) balance;cognition;coordination;endurance/activity tolerance;strength;sensation/sensory awareness  -     Cognitive Impairments, Mobility Safety/Performance insight into deficits/self-awareness;judgment;problem-solving/reasoning;safety precaution awareness;safety precaution follow-through;sequencing abilities  -               User Key  (r) = Recorded By, (t) = Taken By, (c) = Cosigned By      Initials Name Provider Type    Beena Downs PTA Physical Therapist Assistant                   Mobility       [facility] Name 04/24/24 1709          Bed Mobility    Supine-Sit [place] (Bed Mobility) 2 person assist;moderate assist (50% patient effort)  -     Sit-Supine [place] (Bed Mobility) 2 person assist;minimum assist (75% patient effort)  -     Assistive Device (Bed Mobility) bed rails;draw sheet;head of bed elevated  -JM       Row Name 04/24/24 1709          Sit-Stand Transfer    Sit-Stand [place] (Transfers) 2 person assist;moderate assist (50% patient effort);verbal cues;nonverbal cues (demo/gesture)  -     Assistive Device (Sit-Stand Transfers) walker, front-wheeled  -JM       Row Name 04/24/24 1709          Gait/Stairs (Locomotion)    [place] Level (Gait) 2 person assist;moderate assist (50% patient effort)  -     Assistive Device (Gait)  walker, front-wheeled  -     Distance in Feet (Gait) --  2 side steps, seeing people standing beside her while attempting-has been hallucinating  -     Deviations/Abnormal Patterns (Gait) festinating/shuffling  -     Bilateral Gait Deviations forward flexed posture  -     Left Sided Gait Deviations decreased knee extension  -     Right Sided Gait Deviations decreased knee extension  -               User Key  (r) = Recorded By, (t) = Taken By, (c) = Cosigned By      Initials Name Provider Type    Beena Downs PTA Physical Therapist Assistant                   Obj/Interventions       [facility] Name 04/24/24 1712          Motor Skills    Therapeutic Exercise --  LAQS, seated MIP , APs x10  -               User Key  (r) = Recorded By, (t) = Taken By, (c) = Cosigned By      Initials Name Provider Type    Beena Downs PTA Physical Therapist Assistant                   Goals/Plan    No documentation.                  Clinical Impression       [facility] Name 04/24/24 1713          Pain    Pre/Posttreatment Pain Comment did not rate, c/o legs burning since early am  -     Pain Intervention(s) Repositioned;Elevated;Rest  -JM       Row Name 04/24/24 [facility]3          Plan of Care Review    Plan of Care Reviewed With patient  -     Progress no change  -     Outcome Evaluation Pt agreed to PT session, limited steps toward HOB due to hallucinations and limited knee flex bilat; pt unsafe to take steps forw away from bed currently, plans SNU at [place] , unsafe to return home at current level of assist, still requires assist of 2 for safety  -[facility] Name 04/24/24 1713          Therapy Assessment/Plan (PT)    Rehab Potential (PT) fair, will monitor progress closely  -JM       Row Name 04/24/24 1713          Vital Signs    O2 Delivery Pre Treatment supplemental O2  -JM       Row Name 04/24/24 1713          Positioning and Restraints    Pre-Treatment Position in bed  -     Post Treatment Position bed  -     In  Bed fowlers;call light within reach;encouraged to call for assist;exit alarm on;notified nsg;waffle boots/both;side rails up x3  -JM               User Key  (r) = Recorded By, (t) = Taken By, (c) = Cosigned By      Initials Name Provider Type    Beena Downs PTA Physical Therapist Assistant                   Outcome Measures       Row Name 04/24/24 1720 04/24/24 0805       How much help from another person do you currently need...    Turning from your back to your side while in flat bed without using bedrails? 3  -JM 4  -KE    Moving from lying on back to sitting on the side of a flat bed without bedrails? 2  -JM 3  -KE    Moving to and from a bed to a chair (including a wheelchair)? 1  -JOSE ALBERTO 2  -KE    Standing up from a chair using your arms (e.g., wheelchair, bedside chair)? 2  -JOSE ALBERTO 2  -KE    Climbing 3-5 steps with a railing? 1  -JOSE ALBERTO 1  -KE    To walk in hospital room? 1  -JOSE ALBERTO 2  -KE    AM-PAC 6 Clicks Score (PT) 10  - 14  -KE    Highest Level of Mobility Goal 4 --> Transfer to chair/commode  -JM 4 --> Transfer to chair/commode  -KE              User Key  (r) = Recorded By, (t) = Taken By, (c) = Cosigned By      Initials Name Provider Type    Beena Downs PTA Physical Therapist Assistant    Leslie Amaro RN Registered Nurse                                 Physical Therapy Education       Title: PT OT SLP Therapies (In Progress)       Topic: Physical Therapy (In Progress)       Point: Mobility training (In Progress)       Learning Progress Summary             Patient Acceptance, E,D, NR by JOSE ALBERTO at 4/24/2024 1721    Acceptance, E,D, VU,NR by JOSE ALBERTO at 4/23/2024 1716    Acceptance, E, NR by CHERYLE at 4/21/2024 1513                         Point: Home exercise program (In Progress)       Learning Progress Summary             Patient Acceptance, E,D, NR by JOSE ALBERTO at 4/24/2024 1721    Acceptance, E,D, VU,NR by JOSE ALBERTO at 4/23/2024 1716    Acceptance, E, NR by CHERYLE at 4/21/2024 1513                                          User Key       Initials Effective Dates Name Provider Type Discipline     03/07/18 -  Beena Ray PTA Physical Therapist Assistant PT     07/11/23 -  Helene Hernandez PT Physical Therapist PT                  PT Recommendation and Plan     Plan of Care Reviewed With: patient  Progress: no change  Outcome Evaluation: Pt agreed to PT session, limited steps toward HOB due to hallucinations and limited knee flex bilat; pt unsafe to take steps forw away from bed currently, plans SNU at [place] , unsafe to return home at current level of assist, still requires assist of 2 for safety     Time Calculation:         PT Charges       Row Name 04/24/24 1212             Time Calculation    Start Time 0957  -      Stop Time 1022  -      Time Calculation (min) 25 min  -      PT Received On 04/24/24  -JOSE ALBERTO      PT - Next Appointment 04/25/24  -                User Key  (r) = Recorded By, (t) = Taken By, (c) = Cosigned By      Initials Name Provider Type    Beena Downs PTA Physical Therapist Assistant                  Therapy Charges for Today       Code Description Service Date Service Provider Modifiers Qty    02817911457 HC PT THERAPEUTIC ACT EA 15 MIN 4/23/2024 Beena Ray PTA GP 2    17699629910 HC PT THER SUPP EA 15 MIN 4/23/2024 Beena Ray PTA GP 2    09611479213 HC PT THERAPEUTIC ACT EA 15 MIN 4/23/2024 Beena Ray PTA GP 2    12190517166 HC PT THERAPEUTIC ACT EA 15 MIN 4/24/2024 Beena Ray PTA GP 2    86303172840 HC PT THER SUPP EA 15 MIN 4/24/2024 Beena Ray PTA GP 2            PT G-Codes  AM-PAC 6 Clicks Score (PT): 10  PT Discharge Summary  Anticipated Discharge Disposition (PT): skilled nursing facility    eBena Ray PTA  4/24/2024

## 2024-04-24 NOTE — PLAN OF CARE
Goal Outcome Evaluation:  Plan of Care Reviewed With: patient        Progress: no change  Outcome Evaluation: Pt agreed to PT session, limited steps toward HOB due to hallucinations and limited knee flex bilat; pt unsafe to take steps forw away from bed currently, plans SNU at [place] , unsafe to return home at current level of assist, still requires assist of 2 for safety      Anticipated Discharge Disposition (PT): skilled nursing facility

## 2024-04-25 LAB
ALBUMIN SERPL-MCNC: 3.6 G/DL (ref 3.5–5.2)
ALBUMIN/GLOB SERPL: 0.9 G/DL
ALP SERPL-CCNC: 104 U/L (ref 39–117)
ALT SERPL W P-5'-P-CCNC: 9 U/L (ref 1–33)
ANION GAP SERPL CALCULATED.3IONS-SCNC: 14.3 MMOL/L (ref 5–15)
AST SERPL-CCNC: 14 U/L (ref 1–32)
BASOPHILS # BLD AUTO: 0.11 10*3/MM3 (ref 0–0.2)
BASOPHILS NFR BLD AUTO: 1.2 % (ref 0–1.5)
BILIRUB SERPL-MCNC: 0.4 MG/DL (ref 0–1.2)
BUN SERPL-MCNC: 53 MG/DL (ref 8–23)
BUN/CREAT SERPL: 22.4 (ref 7–25)
CALCIUM SPEC-SCNC: 9.3 MG/DL (ref 8.6–10.5)
CHLORIDE SERPL-SCNC: 98 MMOL/L (ref 98–107)
CO2 SERPL-SCNC: 23.7 MMOL/L (ref 22–29)
CREAT SERPL-MCNC: 2.37 MG/DL (ref 0.57–1)
DEPRECATED RDW RBC AUTO: 43.2 FL (ref 37–54)
EGFRCR SERPLBLD CKD-EPI 2021: 19.9 ML/MIN/1.73
EOSINOPHIL # BLD AUTO: 0.36 10*3/MM3 (ref 0–0.4)
EOSINOPHIL NFR BLD AUTO: 4.1 % (ref 0.3–6.2)
ERYTHROCYTE [DISTWIDTH] IN BLOOD BY AUTOMATED COUNT: 14.1 % (ref 12.3–15.4)
GLOBULIN UR ELPH-MCNC: 4 GM/DL
GLUCOSE BLDC GLUCOMTR-MCNC: 144 MG/DL (ref 70–130)
GLUCOSE BLDC GLUCOMTR-MCNC: 189 MG/DL (ref 70–130)
GLUCOSE BLDC GLUCOMTR-MCNC: 220 MG/DL (ref 70–130)
GLUCOSE BLDC GLUCOMTR-MCNC: 297 MG/DL (ref 70–130)
GLUCOSE SERPL-MCNC: 178 MG/DL (ref 65–99)
HCT VFR BLD AUTO: 39.9 % (ref 34–46.6)
HGB BLD-MCNC: 12.6 G/DL (ref 12–15.9)
IMM GRANULOCYTES # BLD AUTO: 0.05 10*3/MM3 (ref 0–0.05)
IMM GRANULOCYTES NFR BLD AUTO: 0.6 % (ref 0–0.5)
LYMPHOCYTES # BLD AUTO: 1.41 10*3/MM3 (ref 0.7–3.1)
LYMPHOCYTES NFR BLD AUTO: 16 % (ref 19.6–45.3)
MAGNESIUM SERPL-MCNC: 2.3 MG/DL (ref 1.6–2.4)
MCH RBC QN AUTO: 26.9 PG (ref 26.6–33)
MCHC RBC AUTO-ENTMCNC: 31.6 G/DL (ref 31.5–35.7)
MCV RBC AUTO: 85.1 FL (ref 79–97)
MONOCYTES # BLD AUTO: 0.7 10*3/MM3 (ref 0.1–0.9)
MONOCYTES NFR BLD AUTO: 7.9 % (ref 5–12)
NEUTROPHILS NFR BLD AUTO: 6.21 10*3/MM3 (ref 1.7–7)
NEUTROPHILS NFR BLD AUTO: 70.2 % (ref 42.7–76)
NRBC BLD AUTO-RTO: 0 /100 WBC (ref 0–0.2)
PHOSPHATE SERPL-MCNC: 5.4 MG/DL (ref 2.5–4.5)
PLATELET # BLD AUTO: 305 10*3/MM3 (ref 140–450)
PMV BLD AUTO: 9.9 FL (ref 6–12)
POTASSIUM SERPL-SCNC: 3.7 MMOL/L (ref 3.5–5.2)
PROT SERPL-MCNC: 7.6 G/DL (ref 6–8.5)
RBC # BLD AUTO: 4.69 10*6/MM3 (ref 3.77–5.28)
SODIUM SERPL-SCNC: 136 MMOL/L (ref 136–145)
URATE SERPL-MCNC: 9 MG/DL (ref 2.4–5.7)
WBC NRBC COR # BLD AUTO: 8.84 10*3/MM3 (ref 3.4–10.8)

## 2024-04-25 PROCEDURE — 63710000001 INSULIN LISPRO (HUMAN) PER 5 UNITS: Performed by: HOSPITALIST

## 2024-04-25 PROCEDURE — 83735 ASSAY OF MAGNESIUM: CPT | Performed by: HOSPITALIST

## 2024-04-25 PROCEDURE — 63710000001 INSULIN GLARGINE PER 5 UNITS: Performed by: HOSPITALIST

## 2024-04-25 PROCEDURE — 84550 ASSAY OF BLOOD/URIC ACID: CPT | Performed by: INTERNAL MEDICINE

## 2024-04-25 PROCEDURE — 85025 COMPLETE CBC W/AUTO DIFF WBC: CPT | Performed by: STUDENT IN AN ORGANIZED HEALTH CARE EDUCATION/TRAINING PROGRAM

## 2024-04-25 PROCEDURE — 80053 COMPREHEN METABOLIC PANEL: CPT | Performed by: HOSPITALIST

## 2024-04-25 PROCEDURE — 82948 REAGENT STRIP/BLOOD GLUCOSE: CPT

## 2024-04-25 PROCEDURE — 63710000001 INSULIN LISPRO (HUMAN) PER 5 UNITS: Performed by: STUDENT IN AN ORGANIZED HEALTH CARE EDUCATION/TRAINING PROGRAM

## 2024-04-25 PROCEDURE — 25010000002 ENOXAPARIN PER 10 MG: Performed by: HOSPITALIST

## 2024-04-25 PROCEDURE — 99232 SBSQ HOSP IP/OBS MODERATE 35: CPT | Performed by: INTERNAL MEDICINE

## 2024-04-25 PROCEDURE — 97530 THERAPEUTIC ACTIVITIES: CPT

## 2024-04-25 PROCEDURE — 84100 ASSAY OF PHOSPHORUS: CPT | Performed by: INTERNAL MEDICINE

## 2024-04-25 RX ORDER — POLYETHYLENE GLYCOL 3350 17 G/17G
17 POWDER, FOR SOLUTION ORAL DAILY
Status: DISCONTINUED | OUTPATIENT
Start: 2024-04-25 | End: 2024-04-26

## 2024-04-25 RX ORDER — AMOXICILLIN 250 MG
2 CAPSULE ORAL 2 TIMES DAILY PRN
Status: DISCONTINUED | OUTPATIENT
Start: 2024-04-25 | End: 2024-04-26

## 2024-04-25 RX ORDER — BISACODYL 5 MG/1
5 TABLET, DELAYED RELEASE ORAL DAILY PRN
Status: DISCONTINUED | OUTPATIENT
Start: 2024-04-25 | End: 2024-04-26

## 2024-04-25 RX ORDER — BISACODYL 10 MG
10 SUPPOSITORY, RECTAL RECTAL DAILY PRN
Status: DISCONTINUED | OUTPATIENT
Start: 2024-04-25 | End: 2024-04-26

## 2024-04-25 RX ADMIN — INSULIN LISPRO 12 UNITS: 100 INJECTION, SOLUTION INTRAVENOUS; SUBCUTANEOUS at 08:39

## 2024-04-25 RX ADMIN — ZINC OXIDE 1 APPLICATION: 200 OINTMENT TOPICAL at 21:48

## 2024-04-25 RX ADMIN — CASTOR OIL AND BALSAM, PERU 1 APPLICATION: 788; 87 OINTMENT TOPICAL at 21:59

## 2024-04-25 RX ADMIN — PREGABALIN 50 MG: 50 CAPSULE ORAL at 21:47

## 2024-04-25 RX ADMIN — INSULIN GLARGINE 35 UNITS: 100 INJECTION, SOLUTION SUBCUTANEOUS at 21:47

## 2024-04-25 RX ADMIN — Medication 1000 MCG: at 08:39

## 2024-04-25 RX ADMIN — BISOPROLOL FUMARATE 5 MG: 5 TABLET, FILM COATED ORAL at 08:39

## 2024-04-25 RX ADMIN — INSULIN LISPRO 12 UNITS: 100 INJECTION, SOLUTION INTRAVENOUS; SUBCUTANEOUS at 17:13

## 2024-04-25 RX ADMIN — INSULIN LISPRO 12 UNITS: 100 INJECTION, SOLUTION INTRAVENOUS; SUBCUTANEOUS at 11:17

## 2024-04-25 RX ADMIN — TORSEMIDE 40 MG: 20 TABLET ORAL at 08:39

## 2024-04-25 RX ADMIN — LIDOCAINE 1 APPLICATION: 4 CREAM TOPICAL at 08:38

## 2024-04-25 RX ADMIN — LEVOTHYROXINE SODIUM 112 MCG: 112 TABLET ORAL at 05:36

## 2024-04-25 RX ADMIN — INSULIN LISPRO 2 UNITS: 100 INJECTION, SOLUTION INTRAVENOUS; SUBCUTANEOUS at 08:39

## 2024-04-25 RX ADMIN — AMLODIPINE BESYLATE 10 MG: 10 TABLET ORAL at 08:39

## 2024-04-25 RX ADMIN — LIDOCAINE 1 APPLICATION: 4 CREAM TOPICAL at 21:48

## 2024-04-25 RX ADMIN — PANTOPRAZOLE SODIUM 40 MG: 40 TABLET, DELAYED RELEASE ORAL at 05:36

## 2024-04-25 RX ADMIN — ATORVASTATIN CALCIUM 80 MG: 80 TABLET, FILM COATED ORAL at 21:47

## 2024-04-25 RX ADMIN — HYDROCODONE BITARTRATE AND ACETAMINOPHEN 1 TABLET: 10; 325 TABLET ORAL at 08:39

## 2024-04-25 RX ADMIN — INSULIN GLARGINE 35 UNITS: 100 INJECTION, SOLUTION SUBCUTANEOUS at 08:39

## 2024-04-25 RX ADMIN — Medication 10 ML: at 21:48

## 2024-04-25 RX ADMIN — DULOXETINE HYDROCHLORIDE 30 MG: 30 CAPSULE, DELAYED RELEASE ORAL at 08:39

## 2024-04-25 RX ADMIN — INSULIN LISPRO 3 UNITS: 100 INJECTION, SOLUTION INTRAVENOUS; SUBCUTANEOUS at 17:13

## 2024-04-25 RX ADMIN — ENOXAPARIN SODIUM 30 MG: 100 INJECTION SUBCUTANEOUS at 21:47

## 2024-04-25 RX ADMIN — HYDROCODONE BITARTRATE AND ACETAMINOPHEN 1 TABLET: 10; 325 TABLET ORAL at 15:25

## 2024-04-25 RX ADMIN — PREGABALIN 50 MG: 50 CAPSULE ORAL at 08:39

## 2024-04-25 RX ADMIN — DOCUSATE SODIUM 50MG AND SENNOSIDES 8.6MG 2 TABLET: 8.6; 5 TABLET, FILM COATED ORAL at 08:43

## 2024-04-25 RX ADMIN — Medication 10 ML: at 08:40

## 2024-04-25 RX ADMIN — HYDROCODONE BITARTRATE AND ACETAMINOPHEN 1 TABLET: 10; 325 TABLET ORAL at 21:47

## 2024-04-25 RX ADMIN — CASTOR OIL AND BALSAM, PERU 1 APPLICATION: 788; 87 OINTMENT TOPICAL at 08:38

## 2024-04-25 RX ADMIN — INSULIN LISPRO 4 UNITS: 100 INJECTION, SOLUTION INTRAVENOUS; SUBCUTANEOUS at 11:17

## 2024-04-25 RX ADMIN — ZINC OXIDE 1 APPLICATION: 200 OINTMENT TOPICAL at 08:38

## 2024-04-25 NOTE — PROGRESS NOTES
Name: Halie Guidry ADMIT: 2024   : 1940  PCP: Napoleon Mead MD    MRN: [id number] LOS: 4 days   AGE/SEX: 83 y.o. female  ROOM: [facility]     Subjective   Subjective   Complains of abdominal bloating and constipation.       Objective   Objective   Vital Signs  Temp:  [97.5 °F (36.4 °C)-98.1 °F (36.7 °C)] 97.5 °F (36.4 °C)  Heart Rate:  [69-76] 75  Resp:  [18] 18  BP: (104-143)/() 104/53  SpO2:  [93 %-99 %] 96 %  on  Flow (L/min):  [2] 2;   Device (Oxygen Therapy): room air  Body mass index is 29.04 kg/m².  Physical Exam  Vitals reviewed.   Constitutional:       General: She is not in acute distress.     Appearance: She is obese.   HENT:      Head: Normocephalic and atraumatic.      Mouth/Throat:      Mouth: Mucous membranes are moist.   Eyes:      General: No scleral icterus.  Neck:      Vascular: No JVD.   Cardiovascular:      Rate and Rhythm: Normal rate and regular rhythm.      Pulses: Normal pulses.      Heart sounds: Normal heart sounds. No murmur heard.  Pulmonary:      Effort: Pulmonary effort is normal. No respiratory distress.      Breath sounds: Normal breath sounds.   Abdominal:      General: Bowel sounds are normal. There is no distension.      Palpations: Abdomen is soft.      Tenderness: There is no abdominal tenderness.   Musculoskeletal:         General: Tenderness (Both LEs) present. No swelling.      Cervical back: Neck supple.   Skin:     General: Skin is warm and dry.      Coloration: Skin is not jaundiced.      Findings: No rash.   Neurological:      Mental Status: She is alert.      Comments: Mildly confused   Psychiatric:         Mood and Affect: Mood normal.         Behavior: Behavior normal.       Results Review     I reviewed the patient's new clinical results.  Results from last 7 days   Lab Units 24  0736 24  0614 24  0547 24  0703   WBC 10*3/mm3 8.84 8.40 8.40 8.76   HEMOGLOBIN g/dL 12.6 11.1* 11.6* 11.6*   PLATELETS 10*3/mm3 305 289  269 252     Results from last 7 days   Lab Units 04/25/24  0736 04/24/24  0614 04/23/24  0547 04/22/24  0703   SODIUM mmol/L 136 136 134* 133*   POTASSIUM mmol/L 3.7 3.4* 3.7 3.7   CHLORIDE mmol/L 98 98 97* 98   CO2 mmol/L 23.7 25.0 23.0 21.8*   BUN mg/dL 53* 44* 37* 42*   CREATININE mg/dL 2.37* 2.04* 1.80* 1.82*   GLUCOSE mg/dL 178* 279* 284* 385*   EGFR mL/min/1.73 19.9* 23.8* 27.7* 27.3*     Results from last 7 days   Lab Units 04/25/24  0736 04/24/24  0614 04/23/24  0547 04/22/24  0703   ALBUMIN g/dL 3.6 3.4* 3.4* 3.3*   BILIRUBIN mg/dL 0.4 0.3 0.3 0.4   ALK PHOS U/L 104 90 97 104   AST (SGOT) U/L 14 10 10 11   ALT (SGPT) U/L 9 9 9 <5     Results from last 7 days   Lab Units 04/25/24  0736 04/24/24  0614 04/23/24  0547 04/22/24  0703   CALCIUM mg/dL 9.3 8.3* 8.6 9.1   ALBUMIN g/dL 3.6 3.4* 3.4* 3.3*   MAGNESIUM mg/dL 2.3 1.5* 1.9 1.5*   PHOSPHORUS mg/dL 5.4* 4.8* 3.7 3.3     Results from last 7 days   Lab Units 04/20/24  2050   PROCALCITONIN ng/mL 0.10     Glucose   Date/Time Value Ref Range Status   04/25/2024 1106 297 (H) 70 - 130 mg/dL Final   04/25/2024 0547 189 (H) 70 - 130 mg/dL Final   04/24/2024 2048 219 (H) 70 - 130 mg/dL Final   04/24/2024 1610 209 (H) 70 - 130 mg/dL Final   04/24/2024 1122 369 (H) 70 - 130 mg/dL Final   04/24/2024 0615 283 (H) 70 - 130 mg/dL Final   04/23/2024 2014 253 (H) 70 - 130 mg/dL Final       No radiology results for the last day    I have personally reviewed all medications:  Scheduled Medications  amLODIPine, 10 mg, Oral, Daily  ammonium lactate, 1 Application, Topical, BID  atorvastatin, 80 mg, Oral, Nightly  bisoprolol, 5 mg, Oral, Daily  castor oil-balsam peru, 1 Application, Topical, Q12H  DULoxetine, 30 mg, Oral, Daily  enoxaparin, 30 mg, Subcutaneous, Nightly  HYDROcodone-acetaminophen, 1 tablet, Oral, TID  hydrocortisone-bacitracin-zinc oxide-nystatin, 1 Application, Topical, BID  insulin glargine, 35 Units, Subcutaneous, Q12H  insulin lispro, 12 Units, Subcutaneous,  TID With Meals  insulin lispro, 2-7 Units, Subcutaneous, 4x Daily AC & at Bedtime  levothyroxine, 112 mcg, Oral, Once per day on Monday Tuesday Wednesday Thursday Friday Saturday  levothyroxine, 168 mcg, Oral, Every Sunday  pantoprazole, 40 mg, Oral, Q AM  polyethylene glycol, 17 g, Oral, Daily  pregabalin, 50 mg, Oral, BID  sodium chloride, 10 mL, Intravenous, Q12H  torsemide, 40 mg, Oral, Daily  cyanocobalamin, 1,000 mcg, Oral, Daily    Infusions  Pharmacy to Dose enoxaparin (LOVENOX),     Diet  Diet: Diabetic; Consistent Carbohydrate; Fluid Consistency: Thin (IDDSI 0)    I have personally reviewed:  [x]  Laboratory   [x]  Microbiology   [x]  Radiology   []  EKG/Telemetry  []  Cardiology/Vascular   []  Pathology    []  Records       Assessment/Plan     Active Hospital Problems    Diagnosis  POA    **Uncontrolled diabetes mellitus with hyperglycemia [E11.65]  Yes    HTN (hypertension) [I10]  Unknown    Pseudohyponatremia [R79.89]  Unknown    Hypertensive kidney disease with stage 3a chronic kidney disease [I12.9, N18.31]  Yes    VIPUL (acute kidney injury) [N17.9]  Yes    Primary hypothyroidism [E03.9]  Yes      Resolved Hospital Problems   No resolved problems to display.       83 y.o. female with history of neuroendocrine tumor admitted with confusion and Uncontrolled diabetes mellitus with hyperglycemia.    DM2 with hyperglycemia- still persists.  First day on higher dose insulin regimen today, monitor and consider further dose titration in AM.    - SSI available prn    Diabetic neuropathy unctrl. Hyperglycemia will worsen this as well. Lyrica increased previously, may need further dose titration but will hold off for now    VIPUL/CKD 3a: Continues to creep upward.  Renal is following, defer diuretic dosing to them.    Neuroendocrine tumor followed by CBC. Receiving octreotide monthly.  Due for injection soon    Hallucinations fairly benign but persist. ?Metabolic per psychiatry.    Replacing lytes per  protocol    Scheduled MiraLAX to encourage bowel movement.      Lovenox  Dispo: SNF recommended, but issue is octreotide injections.  CCP trying to work out arrangements for SNF placement is unclear if patient can be managed at home.    Lewis Longoria MD  [facility]  04/25/24  15:02 EDT

## 2024-04-25 NOTE — CASE MANAGEMENT/SOCIAL WORK
Continued Stay Note  [facility]     Patient Name: Halie Guidry  MRN: [id number]  Today's Date: 4/25/2024    Admit Date: 4/20/2024    Plan: SNF vs HH; SNF referrals pending   Discharge Plan       Row Name 04/25/24 1404       Plan    Plan SNF vs HH; SNF referrals pending    Patient/Family in Agreement with Plan yes    Plan Comments Per oncology note, pt will be on Octretide and [facility] can't accept pt on that medication. Spoke with pt bedside to discuss other SNF options. She is agreeable to referrals close to [facility]. Referrals placed in epic. She is agreeable for [facility] to speak with her son Derrick. Attempted to call son however unable to leave . [facility] following.                   Discharge Codes    No documentation.                 Expected Discharge Date and Time       Expected Discharge Date Expected Discharge Time    Apr 26, 2024               ANA MARIA Dixon

## 2024-04-25 NOTE — PROGRESS NOTES
REASON FOR FOLLOWUP/CHIEF COMPLAINT:  Pancreatic neuroendocrine tumor    HISTORY OF PRESENT ILLNESS:   Complains of constipation today    Past Medical History, Past Surgical History, Social History, Family History have been reviewed and are without significant changes except as mentioned.    Review of Systems   Review of Systems   Constitutional:  Negative for activity change.   HENT:  Negative for nosebleeds and trouble swallowing.    Respiratory:  Negative for shortness of breath and wheezing.    Cardiovascular:  Negative for chest pain and palpitations.   Gastrointestinal:  Positive for constipation. Negative for diarrhea and nausea.   Genitourinary:  Negative for dysuria and hematuria.   Musculoskeletal:  Negative for arthralgias and myalgias.   Skin:  Negative for rash and wound.   Neurological:  Negative for seizures and syncope.   Hematological:  Negative for adenopathy. Does not bruise/bleed easily.   Psychiatric/Behavioral:  Negative for confusion.        Medications:  The current medication list was reviewed in the EMR    ALLERGIES:    Allergies   Allergen Reactions    Sulfa Antibiotics Unknown - High Severity     Pt says it was a long time ago and I don't remember but it wasn't good.               Vitals:    04/24/24 1925 04/24/24 2047 04/24/24 2252 04/25/24 0727   BP: (!) 126/103  Comment: notified Miladys RN- I will retake /61 142/61 141/84   BP Location: Left arm  Right arm Right arm   Patient Position: Lying  Lying Lying   Pulse: 72 73 69 76   Resp: 18  18 18   Temp: 98.1 °F (36.7 °C)  97.7 °F (36.5 °C) 97.9 °F (36.6 °C)   TempSrc: Oral  Oral Oral   SpO2: 94% 93% 94% 99%   Weight:       Height:         Physical Exam    CONSTITUTIONAL:  Vital signs reviewed.  No distress, looks comfortable.  EYES:  Conjunctivae and lids unremarkable.  PERRLA  EARS,NOSE,MOUTH,THROAT:  Ears and nose appear unremarkable.  Lips, teeth, gums appear unremarkable.  RESPIRATORY:  Normal respiratory effort.  Lungs  clear to auscultation bilaterally.  CARDIOVASCULAR:  Normal S1, S2.  No murmurs rubs or gallops.  No significant lower extremity edema.  GASTROINTESTINAL: Abdomen appears unremarkable.  Nontender.  No hepatomegaly.  No splenomegaly.  NEURO: cranial nerves 2-12 grossly intact.  No focal deficits.  Appears to have equal strength all 4 extremities.  MUSCULOSKELETAL:  Unremarkable digits/nails.  No cyanosis or clubbing.  SKIN:  Warm.  No rashes.  PSYCHIATRIC:  Normal judgment and insight.  Normal mood and affect.       RECENT LABS:  WBC   Date Value Ref Range Status   04/25/2024 8.84 3.40 - 10.80 10*3/mm3 Final   04/24/2024 8.40 3.40 - 10.80 10*3/mm3 Final   04/23/2024 8.40 3.40 - 10.80 10*3/mm3 Final     Hemoglobin   Date Value Ref Range Status   04/25/2024 12.6 12.0 - 15.9 g/dL Final   04/24/2024 11.1 (L) 12.0 - 15.9 g/dL Final   04/23/2024 11.6 (L) 12.0 - 15.9 g/dL Final     Platelets   Date Value Ref Range Status   04/25/2024 305 140 - 450 10*3/mm3 Final   04/24/2024 289 140 - 450 10*3/mm3 Final   04/23/2024 269 140 - 450 10*3/mm3 Final       ASSESSMENT/PLAN:  Halie Guidry [id number]     *Well-differentiated pancreatic neuroendocrine tumor   Incidentally noted to have a hyperenhancing pancreatic head mass 3.6 cm with an adjacent 2.8 cm exophytic component or lymph node suspicious for NET radiographically by CT 7/26/2023  Laboratory studies showed a glucagon of 143, gastrin 236, somatostatin 21, CEA 3.09, CA 19-9 44.3, chromogranin A 2296, proinsulin 6.3, insulin 7.3, pancreatic polypeptide 643.6.  EGD on 8/4/2023 showing small hiatal hernia, erythema with erosions in the gastric antrum and body suggestive of erosive gastritis.  An endoscopic ultrasound was performed showing a pancreatic mass 3.5 x 3.0 cm in the pancreatic head and a second mass more circumscribed adjacently possibly communicating.  Fine-needle aspiration of the mass was performed showing a well-differentiated pancreatic neuroendocrine  tumor  9/13/2023 dotatate scan-3.9 cm pancreatic head mass SUV 41.8 adjacent peripancreatic lymph node 2.1 cm SUV 38.4  9/13/2023-initiated octreotide 20 mg IM monthly; dose increased to 30 mg monthly 11/13/2023 12/11/2023 reports her diarrhea is better controlled since the dose increase on octreotide last month.  Did not receive octreotide from 12/11/2023 - 3/25/2024 due to multiple hospitalizations.  3/25/2024-resume octreotide.  Having 1-2 episodes of diarrhea daily.    Monthly octreotide injections are for treatment of her cancer.  I checked with the oncology pharmacist.  Octreotide cannot be given as an inpatient.  She is to resume this upon discharge and continue it monthly.  Is reasonable to be off of it for 2 or 3 weeks but she does need to get started back on this with a plan to continue it monthly indefinitely (stop in the future if it clearly stops working)     *Chronic diarrhea x1 year, hyperglycemia (but diabetic)     *Normocytic, normochromic anemia-hemoglobin 11.3 likely secondary to CKD 3   B12 572, folate 10.9, ferritin 32, iron sat 11%   Hemoglobin improved at 11.6.      *Comorbidities include venous insufficiency, hypertension, hyperlipidemia, CKD, hypothyroidism, advanced age-PS ECOG 2     *Weight gain/lower extremity edema after discontinuation of diuretics few months ago from hospitalization.  Improvement in weight gain and lower extremity edema since reinitiating Lasix 20 mg daily on 11/13/2023.       *Hospitalized December 2023 for diabetic foot ulcer     *2 separate hospitalizations January 2024 related to C. difficile colitis.  Patient experienced VIPUL related to dehydration, improved with IV fluids.    *Anemia  Hb 12.6, from 11.1, from 11.6, from 11.6     *Social-patient is living in her [facility].    Office note 3/25/2024: Son recently moved in since she is unable to care for herself after multiple hospital admissions.  He reports she is unable to perform any of her ADLs.  He is working  full-time and having a difficult time managing her care on top of work.  She is having diarrhea 1-2 times daily and is incontinent.  Refuses to wear briefs.  They are both distressed about their current situation.  Request , Savita Bergeron, come and discuss options with them.  Will place referral for home health with PT/OT/nursing care.  She is also going to look into private care options for them.  4/24/24: Currently in the hospital.  I am told the plan is a long-term nursing facility.     Plan  Patient missed her appointment with Dr. Gutierrez on 4/22/2024 because she was hospitalized..  Follow-up will need to be arranged.  I asked our office to arrange an appointment with Dr. Gutierrez with an octreotide injection in 2 to 3 weeks.  As an outpatient, she needs to resume octreotide at 30 mg monthly IM.  This is the ongoing treatment for her cancer with no good alternatives    Chart reviewed and summarized including hospitalist note and reviewed CBC and CMP which were not ordered by me

## 2024-04-25 NOTE — PROGRESS NOTES
[facility] Progress Note      Patient Name: Halie Guidry  : 1940  MRN: [id number]  Primary Care Physician:  Napoleon Mead MD  Date of admission: 2024    Subjective     Interval History:   Follow-up acute kidney injury on chronic kidney disease    The lower extremity pain has improved but the patient stated that she continued to have visual hallucination, no chest pain or shortness of air, no orthopnea or PND.    Review of Systems:   As noted above    Objective     Vitals:   Temp:  [97.7 °F (36.5 °C)-98.1 °F (36.7 °C)] 97.9 °F (36.6 °C)  Heart Rate:  [69-76] 76  Resp:  [18] 18  BP: (121-143)/() 141/84  Flow (L/min):  [2] 2    Intake/Output Summary (Last 24 hours) at 2024 0859  Last data filed at 2024 0548  Gross per 24 hour   Intake 600 ml   Output 1250 ml   Net -650 ml       Physical Exam:    General Appearance: alert, awake, chronically ill, no acute distress   Skin: warm and dry  HEENT: oral mucosa normal, nonicteric sclera  Neck: supple, no JVD  Lungs: CTA, unlabored breathing effort  Heart: RRR, normal S1 and S2  Abdomen: soft, nontender, nondistended  : no palpable bladder  Extremities: Chronic stasis dermatitis skin wrinkling and she has mycotic toenails, she has 1+ pitting edema  Neuro: normal speech and mental status     Scheduled Meds:     amLODIPine, 10 mg, Oral, Daily  ammonium lactate, 1 Application, Topical, BID  atorvastatin, 80 mg, Oral, Nightly  bisoprolol, 5 mg, Oral, Daily  castor oil-balsam peru, 1 Application, Topical, Q12H  DULoxetine, 30 mg, Oral, Daily  enoxaparin, 30 mg, Subcutaneous, Nightly  HYDROcodone-acetaminophen, 1 tablet, Oral, TID  hydrocortisone-bacitracin-zinc oxide-nystatin, 1 Application, Topical, BID  insulin glargine, 35 Units, Subcutaneous, Q12H  insulin lispro, 12 Units, Subcutaneous, TID With Meals  insulin lispro, 2-7 Units, Subcutaneous, 4x Daily AC & at Bedtime  levothyroxine, 112 mcg, Oral, Once per  day on Monday Tuesday Wednesday Thursday Friday Saturday  levothyroxine, 168 mcg, Oral, Every Sunday  pantoprazole, 40 mg, Oral, Q AM  pregabalin, 50 mg, Oral, BID  sodium chloride, 10 mL, Intravenous, Q12H  torsemide, 40 mg, Oral, Daily  cyanocobalamin, 1,000 mcg, Oral, Daily      IV Meds:   Pharmacy to Dose enoxaparin (LOVENOX),           Results Reviewed:   I have personally reviewed the results from the time of this admission to 4/25/2024 08:59 EDT     Results from last 7 days   Lab Units 04/25/24  0736 04/24/24  0614 04/23/24  0547   SODIUM mmol/L 136 136 134*   POTASSIUM mmol/L 3.7 3.4* 3.7   CHLORIDE mmol/L 98 98 97*   CO2 mmol/L 23.7 25.0 23.0   BUN mg/dL 53* 44* 37*   CREATININE mg/dL 2.37* 2.04* 1.80*   CALCIUM mg/dL 9.3 8.3* 8.6   BILIRUBIN mg/dL 0.4 0.3 0.3   ALK PHOS U/L 104 90 97   ALT (SGPT) U/L 9 9 9   AST (SGOT) U/L 14 10 10   GLUCOSE mg/dL 178* 279* 284*       Estimated Creatinine Clearance: 20 mL/min (A) (by C-G formula based on SCr of 2.37 mg/dL (H)).    Results from last 7 days   Lab Units 04/25/24  0736 04/24/24  0614 04/23/24  0547   MAGNESIUM mg/dL 2.3 1.5* 1.9   PHOSPHORUS mg/dL 5.4* 4.8* 3.7       Results from last 7 days   Lab Units 04/25/24  0736 04/24/24  0614 04/23/24  0547 04/22/24  0703   URIC ACID mg/dL 9.0* 9.5* 8.9* 8.4*       Results from last 7 days   Lab Units 04/25/24  0736 04/24/24  0614 04/23/24  0547 04/22/24  0703 04/20/24  2050   WBC 10*3/mm3 8.84 8.40 8.40 8.76 9.75   HEMOGLOBIN g/dL 12.6 11.1* 11.6* 11.6* 13.5   PLATELETS 10*3/mm3 305 289 269 252 299             Assessment / Plan     ASSESSMENT:  Acute kidney injury associated with volume depletion and inability to autoregulate since the patient was taking ACE inhibitor renal function improving since admission creatinine slightly increased to 2.37, electrolyte within acceptable range and uric acid is 9  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  Discussed the case with the patient and she voiced good understanding  Copied text in this note has been reviewed and is accurate as of 04/25/24.       Thank you for involving us in the care of Halie Guidry.  Please feel free to call with any questions.    Chidi Lanier MD  04/25/24  08:59 EDT    [facility]  [phone]    Please note that portions of this note were completed with a voice recognition program.

## 2024-04-25 NOTE — THERAPY TREATMENT NOTE
Patient Name: Halie Guidry  : 1940    MRN: [id number]                              Today's Date: 2024       Admit Date: 2024    Visit Dx:     ICD-10-CM ICD-9-CM   1. Uncontrolled type 1 diabetes mellitus with hyperglycemia  E10.65 250.83     790.29   2. Acute renal failure, unspecified acute renal failure type  N17.9 584.9     Patient Active Problem List   Diagnosis    Compression fracture    Lumbar degenerative disc disease    Type 2 diabetes mellitus with hyperglycemia, with long-term current use of insulin    Hyperlipidemia    Benign essential hypertension    Primary hypothyroidism    Neuropathy    Osteoporosis    Proteinuria    Tobacco abuse    Diabetic eye exam    Generalized weakness    Spinal stenosis    Scoliosis    Arthritis    VBI (vertebrobasilar insufficiency)    Orthostatic hypotension    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    Severe hypothyroidism    Noncompliance with medication regimen    Vitamin D deficiency disease    Altered mental status    Tremor    Seizure    Stage 3a chronic kidney disease    Thoracic degenerative disc disease    Metabolic encephalopathy    VIPUL (acute kidney injury)    Hyperglycemia    Type II diabetes mellitus, uncontrolled    Lower abdominal pain    Transaminitis    UTI (urinary tract infection)    Emphysematous cystitis    Choledocholithiasis    Hypokalemia    Hypoxia    Generalized abdominal pain    History of Clostridium difficile infection    History of ERCP    Acute UTI (urinary tract infection)    Hypomagnesemia    Burning pain    Anxiety disorder    Neuropathic pain    Intertrigo    Weakness of both lower extremities    Accelerated hypertension    Acute cystitis without hematuria    Altered mental status, unspecified altered mental status type    Left lower lobe pneumonia    Acute pain of left foot    Cellulitis and abscess of left lower extremity    Hypertensive kidney disease with stage 3a chronic kidney disease     Bilateral leg pain    Peripheral edema    Primary malignant neuroendocrine tumor of pancreas    Encounter for long-term (current) use of high-risk medication    Diabetic foot ulcer    Acute renal insufficiency    C. difficile diarrhea    Sepsis    Stage 3a chronic kidney disease    Metabolic encephalopathy    Pressure injury of buttock, stage 2    Uncontrolled diabetes mellitus with hyperglycemia    HTN (hypertension)    Pseudohyponatremia     Past Medical History:   Diagnosis Date    Acute metabolic encephalopathy 6/24/2022    Anxiety     Arthritis     Benign essential hypertension 08/20/2014    Bleeding disorder     Depression     Diabetes     Diabetes mellitus, type 2     Disc degeneration, lumbar     Headache, tension-type     Hyperlipidemia     Hypothyroidism     Neuropathy     Osteoporosis 09/09/2015    Peripheral neuropathy     Rotator cuff tear, left     Scoliosis     Shoulder pain     LEFT, TORN ROTATOR CUFF S/P FALL    Spinal stenosis      Past Surgical History:   Procedure Laterality Date    APPENDECTOMY      BILATERAL BREAST REDUCTION Bilateral 08/2015    CATARACT EXTRACTION  03/2015    CHOLECYSTECTOMY WITH INTRAOPERATIVE CHOLANGIOGRAM N/A 3/27/2022    Procedure: CHOLECYSTECTOMY LAPAROSCOPIC INTRAOPERATIVE CHOLANGIOGRAM;  Surgeon: Aiyana Hill MD;  Location: [facility] OR;  Service: General;  Laterality: N/A;    COLONOSCOPY  06/05/2015    WNL    ERCP N/A 2/25/2022    Procedure: ENDOSCOPIC RETROGRADE CHOLANGIOPANCREATOGRAPHY with sphincterotomy and balloon sweep;  Surgeon: Chilo Wilhelm MD;  Location: [facility] ENDOSCOPY;  Service: Gastroenterology;  Laterality: N/A;  PRE/POST - CBD stones    ERCP N/A 3/28/2022    Procedure: ENDOSCOPIC RETROGRADE CHOLANGIOPANCREATOGRAPHY WITH SPHINCTEROTOMY AND BALLOON SWEEP;  Surgeon: Chilo Wilhelm MD;  Location: [facility] ENDOSCOPY;  Service: Gastroenterology;  Laterality: N/A;  PRE: COMMON DUCT STONE  POST: COMMON DUCT STONE    KYPHOPLASTY      REDUCTION  MAMMAPLASTY      TONSILLECTOMY        General Information       Row Name 04/25/24 1533          Physical Therapy Time and Intention    Document Type therapy note (daily note)  -     Mode of Treatment individual therapy;physical therapy  -[facility] Name 04/25/24 1533          General Information    Patient Profile Reviewed yes  -     Existing Precautions/Restrictions fall  -[facility] Name 04/25/24 1533          Cognition    Orientation Status (Cognition) oriented x 3  seeing things not there in room, but is aware she is  -[facility] Name 04/25/24 1533          Safety Issues, Functional Mobility    Impairments Affecting Function (Mobility) balance;cognition;coordination;endurance/activity tolerance;strength;sensation/sensory awareness  -     Cognitive Impairments, Mobility Safety/Performance insight into deficits/self-awareness;judgment;problem-solving/reasoning;safety precaution awareness;safety precaution follow-through;sequencing abilities  -               User Key  (r) = Recorded By, (t) = Taken By, (c) = Cosigned By      Initials Name Provider Type     Beena Ray PTA Physical Therapist Assistant                   Mobility       [facility] Name 04/25/24 1535          Bed Mobility    Supine-Sit Morrison (Bed Mobility) 2 person assist;moderate assist (50% patient effort)  -     Sit-Supine [place] (Bed Mobility) 2 person assist;minimum assist (75% patient effort)  -     Assistive Device (Bed Mobility) bed rails;draw sheet;head of bed elevated  -[facility] Name 04/25/24 1541          Bed-Chair Transfer    Bed-Chair Morrison (Transfers) 2 person assist;moderate assist (50% patient effort)  -     Assistive Device (Bed-Chair Transfers) walker, front-wheeled  -     Comment, (Bed-Chair Transfer) bed to bsc  -[facility] Name 04/25/24 1535          Sit-Stand Transfer    Sit-Stand Morrison (Transfers) 2 person assist;moderate assist (50% patient effort);verbal cues;nonverbal cues  (demo/gesture)  -     Assistive Device (Sit-Stand Transfers) walker, front-wheeled  -JM     Comment, (Sit-Stand Transfer) perf x4 in total w/assist for clean-up for BM  -JM       Row Name 04/25/24 1535          Gait/Stairs (Locomotion)    [place] Level (Gait) 2 person assist;moderate assist (50% patient effort)  -     Assistive Device (Gait) walker, front-wheeled  -JM     Deviations/Abnormal Patterns (Gait) festinating/shuffling  -     Bilateral Gait Deviations forward flexed posture  -     Left Sided Gait Deviations decreased knee extension  -     Right Sided Gait Deviations decreased knee extension  -               User Key  (r) = Recorded By, (t) = Taken By, (c) = Cosigned By      Initials Name Provider Type    Beena Downs PTA Physical Therapist Assistant                   Obj/Interventions    No documentation.                  Goals/Plan    No documentation.                  Clinical Impression       Row Name 04/25/24 1538          Pain    Pretreatment Pain Rating 0/10 - no pain  -     Pre/Posttreatment Pain Comment some pain when LEs touched for tfers  -     Pain Intervention(s) Repositioned;Elevated;Rest  -       Row Name 04/25/24 1538          Plan of Care Review    Plan of Care Reviewed With patient  -JM     Progress improving  -               User Key  (r) = Recorded By, (t) = Taken By, (c) = Cosigned By      Initials Name Provider Type    Beena Downs PTA Physical Therapist Assistant                   Outcome Measures       Row Name 04/25/24 1543 04/25/24 0840       How much help from another person do you currently need...    Turning from your back to your side while in flat bed without using bedrails? 3  -JM 3  -KE    Moving from lying on back to sitting on the side of a flat bed without bedrails? 2  -JM 2  -KE    Moving to and from a bed to a chair (including a wheelchair)? 2  -JM 1  -KE    Standing up from a chair using your arms (e.g., wheelchair, bedside  chair)? 2  -JOSE ALBERTO 2  -KE    Climbing 3-5 steps with a railing? 1  - 1  -KE    To walk in hospital room? 1  - 1  -KE    AM-PAC 6 Clicks Score (PT) 11  - 10  -KE    Highest Level of Mobility Goal 4 --> Transfer to chair/commode  - 4 --> Transfer to chair/commode  -KE              User Key  (r) = Recorded By, (t) = Taken By, (c) = Cosigned By      Initials Name Provider Type    Beena Downs PTA Physical Therapist Assistant    Leslie Amaro RN Registered Nurse                                 Physical Therapy Education       Title: PT OT SLP Therapies (Done)       Topic: Physical Therapy (Done)       Point: Mobility training (Done)       Learning Progress Summary             Patient Acceptance, E,TB,D, VU,NR by  at 4/25/2024 1544    Acceptance, E,D, NR by  at 4/24/2024 1721    Acceptance, E,D, VU,NR by  at 4/23/2024 1716    Acceptance, E, NR by  at 4/21/2024 1513                         Point: Home exercise program (Done)       Learning Progress Summary             Patient Acceptance, E,TB,D, VU,NR by  at 4/25/2024 1544    Acceptance, E,D, NR by  at 4/24/2024 1721    Acceptance, E,D, VU,NR by  at 4/23/2024 1716    Acceptance, E, NR by  at 4/21/2024 1513                                         User Key       Initials Effective Dates Name Provider Type Discipline     03/07/18 -  Beena Ray PTA Physical Therapist Assistant PT     07/11/23 -  Helene Hernandez, PT Physical Therapist PT                  PT Recommendation and Plan     Plan of Care Reviewed With: patient  Progress: improving  Outcome Evaluation: Pt agreed to PT session, limited steps toward HOB due to hallucinations and limited knee flex bilat; pt unsafe to take steps forw away from bed currently, plans SNU at [place] , unsafe to return home at current level of assist, still requires assist of 2 for safety     Time Calculation:         PT Charges       Row Name 04/25/24 1549             Time Calculation    Start Time  1349  -JOSE ALBERTO      Stop Time 1430  -JOSE ALBERTO      Time Calculation (min) 41 min  -JOSE ALBERTO      PT Received On 04/25/24  -JOSE ALBERTO      PT - Next Appointment 04/26/24  -JOSE ALBERTO                User Key  (r) = Recorded By, (t) = Taken By, (c) = Cosigned By      Initials Name Provider Type    Beena Downs PTA Physical Therapist Assistant                  Therapy Charges for Today       Code Description Service Date Service Provider Modifiers Qty    36877224328 HC PT THERAPEUTIC ACT EA 15 MIN 4/24/2024 Beena Ray PTA GP 2    74367485552 HC PT THER SUPP EA 15 MIN 4/24/2024 Beena Ray PTA GP 2    07769737702 HC PT THERAPEUTIC ACT EA 15 MIN 4/25/2024 Beena Ray PTA GP 3    72133977779 HC PT THER SUPP EA 15 MIN 4/25/2024 Beena Ray, LOKI GP 3            PT G-Codes  AM-PAC 6 Clicks Score (PT): 11  PT Discharge Summary  Anticipated Discharge Disposition (PT): skilled nursing facility    Beena Ray PTA  4/25/2024

## 2024-04-25 NOTE — PROGRESS NOTES
"Nutrition Services    Patient Name:  Halie Guidry  YOB: 1940  MRN: [id number]  Admit Date:  4/20/2024    Assessment Date:  04/25/24    NUTRITION SCREENING      Reason for Encounter Follow-up protocol   Diagnosis/Problem Uncontrolled DM with hyperglycemia, VIPUL, CKD stage IIIb, HTN, diabetic neuropathy, hypothyroidism, pseudohyponatremia        PO Diet Diet: Diabetic; Consistent Carbohydrate; Fluid Consistency: Thin (IDDSI 0)   Supplements    PO Intake % 100% x 2 meals - pt reported good appetite       Medications MAR reviewed by RD   Labs  Listed below, reviewed   Physical Findings Alert, experiencing hallucinations, room air, 1+ (trace) edema, dental appliance   GI Function Nondistended, abdominal discomfort, last BM: 4/21 -Pericolace given BID   Skin Status Wound on bilateral coccyx       Height  Weight  BMI  Weight Trend     Height: 170.2 cm (67\")  Weight: 84.1 kg (185 lb 6.5 oz) (04/21/24 0034)  Body mass index is 29.04 kg/m².  Loss       Nutrition Problem (PES) Overweight / Obesity related to diet and lifestyle as evidenced by weight status and/or BMI.       Intervention/Plan Encourage good PO intake.   Will continue to monitor skin status.  Pt may benefit from stronger BM regimen as pt reported she is constipated despite current regimen.    RD to follow up per protocol.     Results from last 7 days   Lab Units 04/25/24  0736 04/24/24  0614 04/23/24  0547   SODIUM mmol/L 136 136 134*   POTASSIUM mmol/L 3.7 3.4* 3.7   CHLORIDE mmol/L 98 98 97*   CO2 mmol/L 23.7 25.0 23.0   BUN mg/dL 53* 44* 37*   CREATININE mg/dL 2.37* 2.04* 1.80*   CALCIUM mg/dL 9.3 8.3* 8.6   BILIRUBIN mg/dL 0.4 0.3 0.3   ALK PHOS U/L 104 90 97   ALT (SGPT) U/L 9 9 9   AST (SGOT) U/L 14 10 10   GLUCOSE mg/dL 178* 279* 284*     Results from last 7 days   Lab Units 04/25/24  0736 04/24/24  0614 04/23/24  0547   MAGNESIUM mg/dL 2.3 1.5* 1.9   PHOSPHORUS mg/dL 5.4* 4.8* 3.7   HEMOGLOBIN g/dL 12.6 11.1* 11.6*   HEMATOCRIT % " 39.9 34.4 36.0     Lab Results   Component Value Date    HGBA1C 14.20 (H) 04/20/2024         Electronically signed by:  Arcelia Handley  04/25/24 09:47 EDT

## 2024-04-25 NOTE — PROGRESS NOTES
"    Name: Halie Guidry ADMIT: 2024   : 1940  PCP: Napoleon Mead MD    MRN: [id number] LOS: 3 days   AGE/SEX: 83 y.o. female  ROOM: [facility]     Subjective   Subjective   No new events. She still reports hallucinations, seeing \"buffet line\" of food out of peripheral vision. Reports LE burnig bilaterally       Objective   Objective   Vital Signs  Temp:  [97.7 °F (36.5 °C)-98.1 °F (36.7 °C)] 98.1 °F (36.7 °C)  Heart Rate:  [66-75] 73  Resp:  [18] 18  BP: (121-143)/() 143/61  SpO2:  [91 %-97 %] 93 %  on  Flow (L/min):  [2] 2;   Device (Oxygen Therapy): nasal cannula  Body mass index is 29.04 kg/m².  Physical Exam  Vitals reviewed.   Constitutional:       General: She is not in acute distress.     Appearance: She is obese.   HENT:      Head: Normocephalic and atraumatic.      Mouth/Throat:      Mouth: Mucous membranes are moist.      Pharynx: No posterior oropharyngeal erythema.   Eyes:      General: No scleral icterus.  Neck:      Vascular: No JVD.   Cardiovascular:      Rate and Rhythm: Normal rate and regular rhythm.      Pulses: Normal pulses.      Heart sounds: Normal heart sounds. No murmur heard.  Pulmonary:      Effort: Pulmonary effort is normal. No respiratory distress.      Breath sounds: Normal breath sounds.   Abdominal:      General: Bowel sounds are normal. There is no distension.      Palpations: Abdomen is soft.      Tenderness: There is no abdominal tenderness.   Musculoskeletal:         General: Tenderness (LLE) present. No swelling.      Cervical back: Neck supple.   Skin:     General: Skin is warm and dry.      Coloration: Skin is not jaundiced.      Findings: No rash.   Neurological:      Mental Status: She is alert.      Comments: Mildly confused   Psychiatric:         Mood and Affect: Mood normal.         Behavior: Behavior normal.       Results Review     I reviewed the patient's new clinical results.  Results from last 7 days   Lab Units 24  0614 24  0547 " 04/22/24  0703 04/20/24 2050   WBC 10*3/mm3 8.40 8.40 8.76 9.75   HEMOGLOBIN g/dL 11.1* 11.6* 11.6* 13.5   PLATELETS 10*3/mm3 289 269 252 299     Results from last 7 days   Lab Units 04/24/24  0614 04/23/24  0547 04/22/24  0703 04/21/24  1209   SODIUM mmol/L 136 134* 133* 131*   POTASSIUM mmol/L 3.4* 3.7 3.7 3.3*   CHLORIDE mmol/L 98 97* 98 94*   CO2 mmol/L 25.0 23.0 21.8* 24.0   BUN mg/dL 44* 37* 42* 41*   CREATININE mg/dL 2.04* 1.80* 1.82* 2.36*   GLUCOSE mg/dL 279* 284* 385* 261*   EGFR mL/min/1.73 23.8* 27.7* 27.3* 20.0*     Results from last 7 days   Lab Units 04/24/24  0614 04/23/24  0547 04/22/24  0703 04/21/24  1209   ALBUMIN g/dL 3.4* 3.4* 3.3* 3.5   BILIRUBIN mg/dL 0.3 0.3 0.4 0.4   ALK PHOS U/L 90 97 104 117   AST (SGOT) U/L 10 10 11 9   ALT (SGPT) U/L 9 9 <5 8     Results from last 7 days   Lab Units 04/24/24  0614 04/23/24  0547 04/22/24  0703 04/21/24  1209   CALCIUM mg/dL 8.3* 8.6 9.1 9.1   ALBUMIN g/dL 3.4* 3.4* 3.3* 3.5   MAGNESIUM mg/dL 1.5* 1.9 1.5*  --    PHOSPHORUS mg/dL 4.8* 3.7 3.3  --      Results from last 7 days   Lab Units 04/20/24  2050   PROCALCITONIN ng/mL 0.10     Glucose   Date/Time Value Ref Range Status   04/24/2024 2048 219 (H) 70 - 130 mg/dL Final   04/24/2024 1610 209 (H) 70 - 130 mg/dL Final   04/24/2024 1122 369 (H) 70 - 130 mg/dL Final   04/24/2024 0615 283 (H) 70 - 130 mg/dL Final   04/23/2024 2014 253 (H) 70 - 130 mg/dL Final   04/23/2024 1557 220 (H) 70 - 130 mg/dL Final   04/23/2024 1104 214 (H) 70 - 130 mg/dL Final       No radiology results for the last day    I have personally reviewed all medications:  Scheduled Medications  amLODIPine, 10 mg, Oral, Daily  ammonium lactate, 1 Application, Topical, BID  atorvastatin, 80 mg, Oral, Nightly  bisoprolol, 5 mg, Oral, Daily  castor oil-balsam peru, 1 Application, Topical, Q12H  DULoxetine, 30 mg, Oral, Daily  enoxaparin, 30 mg, Subcutaneous, Nightly  HYDROcodone-acetaminophen, 1 tablet, Oral,  TID  hydrocortisone-bacitracin-zinc oxide-nystatin, 1 Application, Topical, BID  insulin glargine, 32 Units, Subcutaneous, Q12H  insulin lispro, 10 Units, Subcutaneous, TID With Meals  insulin lispro, 2-7 Units, Subcutaneous, 4x Daily AC & at Bedtime  levothyroxine, 112 mcg, Oral, Once per day on Monday Tuesday Wednesday Thursday Friday Saturday  levothyroxine, 168 mcg, Oral, Every Sunday  pantoprazole, 40 mg, Oral, Q AM  pregabalin, 50 mg, Oral, BID  sodium chloride, 10 mL, Intravenous, Q12H  torsemide, 40 mg, Oral, Daily  cyanocobalamin, 1,000 mcg, Oral, Daily    Infusions  Pharmacy to Dose enoxaparin (LOVENOX),     Diet  Diet: Diabetic; Consistent Carbohydrate; Fluid Consistency: Thin (IDDSI 0)    I have personally reviewed:  [x]  Laboratory   [x]  Microbiology   [x]  Radiology   []  EKG/Telemetry  []  Cardiology/Vascular   []  Pathology    []  Records       Assessment/Plan     Active Hospital Problems    Diagnosis  POA    **Uncontrolled diabetes mellitus with hyperglycemia [E11.65]  Yes    HTN (hypertension) [I10]  Unknown    Pseudohyponatremia [R79.89]  Unknown    Hypertensive kidney disease with stage 3a chronic kidney disease [I12.9, N18.31]  Yes    VIPUL (acute kidney injury) [N17.9]  Yes    Primary hypothyroidism [E03.9]  Yes      Resolved Hospital Problems   No resolved problems to display.       83 y.o. female with history of neuroendocrine tumor admitted with confusion and Uncontrolled diabetes mellitus with hyperglycemia.    DM2 with hyperglycemia- still persists. Will increase scheduled insulin and monitor tomorrow.  - SSI available prn    Diabetic neuropathy unctrl. Hyperglycemia will worsen this as well. Lyrica increased yesterday, may need further dose titration    VIPUL/CKD 3a: stable/slightly increasing. Renal is following, defer diuretic dosing to them.    Neuroendocrine tumor followed by CBC. Receiving octreotide, need clarification from oncology if this is planned to continue while patient in  rehab.    Hallucinations fairly benign but persist. ?Metabolic per psychiatry.    Replacing lytes per protocol      Lovenox  Dispo: As above, likely stable for dc soon     Lewis Longoria MD  [facility]  04/24/24  22:35 EDT

## 2024-04-25 NOTE — PROGRESS NOTES
"The patient continues to report that when she turns her head to the left she continues to see \"a buffet of food\".  She continues to be troubled by this in spite of my reassurances that I do not believe that she is experiencing any true psychotic symptoms.  I do not feel as though this symptom would be amenable to psychotropic medication.  "

## 2024-04-26 LAB
ALBUMIN SERPL-MCNC: 3.2 G/DL (ref 3.5–5.2)
ANION GAP SERPL CALCULATED.3IONS-SCNC: 14.6 MMOL/L (ref 5–15)
ANION GAP SERPL CALCULATED.3IONS-SCNC: 14.6 MMOL/L (ref 5–15)
BASOPHILS # BLD AUTO: 0.11 10*3/MM3 (ref 0–0.2)
BASOPHILS NFR BLD AUTO: 1.1 % (ref 0–1.5)
BUN SERPL-MCNC: 61 MG/DL (ref 8–23)
BUN SERPL-MCNC: 61 MG/DL (ref 8–23)
BUN/CREAT SERPL: 25 (ref 7–25)
BUN/CREAT SERPL: 25 (ref 7–25)
CALCIUM SPEC-SCNC: 8.8 MG/DL (ref 8.6–10.5)
CALCIUM SPEC-SCNC: 8.8 MG/DL (ref 8.6–10.5)
CHLORIDE SERPL-SCNC: 99 MMOL/L (ref 98–107)
CHLORIDE SERPL-SCNC: 99 MMOL/L (ref 98–107)
CO2 SERPL-SCNC: 23.4 MMOL/L (ref 22–29)
CO2 SERPL-SCNC: 23.4 MMOL/L (ref 22–29)
CREAT SERPL-MCNC: 2.44 MG/DL (ref 0.57–1)
CREAT SERPL-MCNC: 2.44 MG/DL (ref 0.57–1)
DEPRECATED RDW RBC AUTO: 45.5 FL (ref 37–54)
EGFRCR SERPLBLD CKD-EPI 2021: 19.2 ML/MIN/1.73
EGFRCR SERPLBLD CKD-EPI 2021: 19.2 ML/MIN/1.73
EOSINOPHIL # BLD AUTO: 0.38 10*3/MM3 (ref 0–0.4)
EOSINOPHIL NFR BLD AUTO: 3.9 % (ref 0.3–6.2)
ERYTHROCYTE [DISTWIDTH] IN BLOOD BY AUTOMATED COUNT: 14.1 % (ref 12.3–15.4)
GLUCOSE BLDC GLUCOMTR-MCNC: 137 MG/DL (ref 70–130)
GLUCOSE BLDC GLUCOMTR-MCNC: 153 MG/DL (ref 70–130)
GLUCOSE BLDC GLUCOMTR-MCNC: 180 MG/DL (ref 70–130)
GLUCOSE BLDC GLUCOMTR-MCNC: 256 MG/DL (ref 70–130)
GLUCOSE SERPL-MCNC: 177 MG/DL (ref 65–99)
GLUCOSE SERPL-MCNC: 177 MG/DL (ref 65–99)
HCT VFR BLD AUTO: 35.9 % (ref 34–46.6)
HGB BLD-MCNC: 11.6 G/DL (ref 12–15.9)
IMM GRANULOCYTES # BLD AUTO: 0.05 10*3/MM3 (ref 0–0.05)
IMM GRANULOCYTES NFR BLD AUTO: 0.5 % (ref 0–0.5)
LYMPHOCYTES # BLD AUTO: 2.05 10*3/MM3 (ref 0.7–3.1)
LYMPHOCYTES NFR BLD AUTO: 20.9 % (ref 19.6–45.3)
MAGNESIUM SERPL-MCNC: 2.1 MG/DL (ref 1.6–2.4)
MCH RBC QN AUTO: 28.3 PG (ref 26.6–33)
MCHC RBC AUTO-ENTMCNC: 32.3 G/DL (ref 31.5–35.7)
MCV RBC AUTO: 87.6 FL (ref 79–97)
MONOCYTES # BLD AUTO: 0.94 10*3/MM3 (ref 0.1–0.9)
MONOCYTES NFR BLD AUTO: 9.6 % (ref 5–12)
NEUTROPHILS NFR BLD AUTO: 6.29 10*3/MM3 (ref 1.7–7)
NEUTROPHILS NFR BLD AUTO: 64 % (ref 42.7–76)
NRBC BLD AUTO-RTO: 0 /100 WBC (ref 0–0.2)
PHOSPHATE SERPL-MCNC: 5.4 MG/DL (ref 2.5–4.5)
PLATELET # BLD AUTO: 283 10*3/MM3 (ref 140–450)
PMV BLD AUTO: 9.9 FL (ref 6–12)
POTASSIUM SERPL-SCNC: 3.7 MMOL/L (ref 3.5–5.2)
POTASSIUM SERPL-SCNC: 3.7 MMOL/L (ref 3.5–5.2)
RBC # BLD AUTO: 4.1 10*6/MM3 (ref 3.77–5.28)
SODIUM SERPL-SCNC: 137 MMOL/L (ref 136–145)
SODIUM SERPL-SCNC: 137 MMOL/L (ref 136–145)
URATE SERPL-MCNC: 9.4 MG/DL (ref 2.4–5.7)
WBC NRBC COR # BLD AUTO: 9.82 10*3/MM3 (ref 3.4–10.8)

## 2024-04-26 PROCEDURE — 99232 SBSQ HOSP IP/OBS MODERATE 35: CPT | Performed by: INTERNAL MEDICINE

## 2024-04-26 PROCEDURE — 63710000001 INSULIN LISPRO (HUMAN) PER 5 UNITS: Performed by: HOSPITALIST

## 2024-04-26 PROCEDURE — 36415 COLL VENOUS BLD VENIPUNCTURE: CPT | Performed by: STUDENT IN AN ORGANIZED HEALTH CARE EDUCATION/TRAINING PROGRAM

## 2024-04-26 PROCEDURE — 63710000001 INSULIN LISPRO (HUMAN) PER 5 UNITS: Performed by: STUDENT IN AN ORGANIZED HEALTH CARE EDUCATION/TRAINING PROGRAM

## 2024-04-26 PROCEDURE — 97530 THERAPEUTIC ACTIVITIES: CPT

## 2024-04-26 PROCEDURE — 82948 REAGENT STRIP/BLOOD GLUCOSE: CPT

## 2024-04-26 PROCEDURE — 84550 ASSAY OF BLOOD/URIC ACID: CPT | Performed by: INTERNAL MEDICINE

## 2024-04-26 PROCEDURE — 97110 THERAPEUTIC EXERCISES: CPT

## 2024-04-26 PROCEDURE — 80069 RENAL FUNCTION PANEL: CPT | Performed by: INTERNAL MEDICINE

## 2024-04-26 PROCEDURE — 63710000001 INSULIN GLARGINE PER 5 UNITS: Performed by: HOSPITALIST

## 2024-04-26 PROCEDURE — 83735 ASSAY OF MAGNESIUM: CPT | Performed by: INTERNAL MEDICINE

## 2024-04-26 PROCEDURE — 85025 COMPLETE CBC W/AUTO DIFF WBC: CPT | Performed by: STUDENT IN AN ORGANIZED HEALTH CARE EDUCATION/TRAINING PROGRAM

## 2024-04-26 PROCEDURE — 25010000002 ENOXAPARIN PER 10 MG: Performed by: HOSPITALIST

## 2024-04-26 PROCEDURE — 80048 BASIC METABOLIC PNL TOTAL CA: CPT | Performed by: STUDENT IN AN ORGANIZED HEALTH CARE EDUCATION/TRAINING PROGRAM

## 2024-04-26 RX ORDER — BISACODYL 10 MG
10 SUPPOSITORY, RECTAL RECTAL DAILY PRN
Status: DISCONTINUED | OUTPATIENT
Start: 2024-04-26 | End: 2024-05-01 | Stop reason: HOSPADM

## 2024-04-26 RX ORDER — TORSEMIDE 20 MG/1
20 TABLET ORAL DAILY
Status: DISCONTINUED | OUTPATIENT
Start: 2024-04-27 | End: 2024-04-29

## 2024-04-26 RX ORDER — POLYETHYLENE GLYCOL 3350 17 G/17G
17 POWDER, FOR SOLUTION ORAL DAILY PRN
Status: DISCONTINUED | OUTPATIENT
Start: 2024-04-26 | End: 2024-05-01 | Stop reason: HOSPADM

## 2024-04-26 RX ORDER — PREGABALIN 75 MG/1
75 CAPSULE ORAL 2 TIMES DAILY
Status: DISCONTINUED | OUTPATIENT
Start: 2024-04-26 | End: 2024-05-01 | Stop reason: HOSPADM

## 2024-04-26 RX ORDER — AMOXICILLIN 250 MG
2 CAPSULE ORAL 2 TIMES DAILY PRN
Status: DISCONTINUED | OUTPATIENT
Start: 2024-04-26 | End: 2024-05-01 | Stop reason: HOSPADM

## 2024-04-26 RX ORDER — BISACODYL 5 MG/1
5 TABLET, DELAYED RELEASE ORAL DAILY PRN
Status: DISCONTINUED | OUTPATIENT
Start: 2024-04-26 | End: 2024-05-01 | Stop reason: HOSPADM

## 2024-04-26 RX ADMIN — INSULIN LISPRO 12 UNITS: 100 INJECTION, SOLUTION INTRAVENOUS; SUBCUTANEOUS at 09:24

## 2024-04-26 RX ADMIN — PANTOPRAZOLE SODIUM 40 MG: 40 TABLET, DELAYED RELEASE ORAL at 05:30

## 2024-04-26 RX ADMIN — Medication 10 ML: at 21:05

## 2024-04-26 RX ADMIN — ATORVASTATIN CALCIUM 80 MG: 80 TABLET, FILM COATED ORAL at 21:04

## 2024-04-26 RX ADMIN — LEVOTHYROXINE SODIUM 112 MCG: 112 TABLET ORAL at 05:30

## 2024-04-26 RX ADMIN — PREGABALIN 50 MG: 50 CAPSULE ORAL at 09:25

## 2024-04-26 RX ADMIN — INSULIN GLARGINE 35 UNITS: 100 INJECTION, SOLUTION SUBCUTANEOUS at 09:25

## 2024-04-26 RX ADMIN — HYDROCODONE BITARTRATE AND ACETAMINOPHEN 1 TABLET: 10; 325 TABLET ORAL at 09:22

## 2024-04-26 RX ADMIN — INSULIN LISPRO 4 UNITS: 100 INJECTION, SOLUTION INTRAVENOUS; SUBCUTANEOUS at 12:29

## 2024-04-26 RX ADMIN — CASTOR OIL AND BALSAM, PERU 1 APPLICATION: 788; 87 OINTMENT TOPICAL at 21:06

## 2024-04-26 RX ADMIN — INSULIN GLARGINE 35 UNITS: 100 INJECTION, SOLUTION SUBCUTANEOUS at 21:04

## 2024-04-26 RX ADMIN — HYDROCODONE BITARTRATE AND ACETAMINOPHEN 1 TABLET: 10; 325 TABLET ORAL at 21:04

## 2024-04-26 RX ADMIN — LIDOCAINE 1 APPLICATION: 4 CREAM TOPICAL at 21:05

## 2024-04-26 RX ADMIN — LIDOCAINE 1 APPLICATION: 4 CREAM TOPICAL at 12:30

## 2024-04-26 RX ADMIN — Medication 10 ML: at 09:57

## 2024-04-26 RX ADMIN — CASTOR OIL AND BALSAM, PERU 1 APPLICATION: 788; 87 OINTMENT TOPICAL at 09:26

## 2024-04-26 RX ADMIN — ZINC OXIDE 1 APPLICATION: 200 OINTMENT TOPICAL at 12:30

## 2024-04-26 RX ADMIN — TORSEMIDE 40 MG: 20 TABLET ORAL at 09:25

## 2024-04-26 RX ADMIN — INSULIN LISPRO 12 UNITS: 100 INJECTION, SOLUTION INTRAVENOUS; SUBCUTANEOUS at 17:54

## 2024-04-26 RX ADMIN — PREGABALIN 75 MG: 75 CAPSULE ORAL at 21:04

## 2024-04-26 RX ADMIN — BISOPROLOL FUMARATE 5 MG: 5 TABLET, FILM COATED ORAL at 09:25

## 2024-04-26 RX ADMIN — ZINC OXIDE 1 APPLICATION: 200 OINTMENT TOPICAL at 21:05

## 2024-04-26 RX ADMIN — HYDROCODONE BITARTRATE AND ACETAMINOPHEN 1 TABLET: 10; 325 TABLET ORAL at 16:22

## 2024-04-26 RX ADMIN — ENOXAPARIN SODIUM 30 MG: 100 INJECTION SUBCUTANEOUS at 21:04

## 2024-04-26 RX ADMIN — INSULIN LISPRO 2 UNITS: 100 INJECTION, SOLUTION INTRAVENOUS; SUBCUTANEOUS at 17:53

## 2024-04-26 RX ADMIN — Medication 1000 MCG: at 09:25

## 2024-04-26 RX ADMIN — INSULIN LISPRO 2 UNITS: 100 INJECTION, SOLUTION INTRAVENOUS; SUBCUTANEOUS at 09:20

## 2024-04-26 RX ADMIN — INSULIN LISPRO 12 UNITS: 100 INJECTION, SOLUTION INTRAVENOUS; SUBCUTANEOUS at 12:29

## 2024-04-26 RX ADMIN — DULOXETINE HYDROCHLORIDE 30 MG: 30 CAPSULE, DELAYED RELEASE ORAL at 09:25

## 2024-04-26 RX ADMIN — AMLODIPINE BESYLATE 10 MG: 10 TABLET ORAL at 09:24

## 2024-04-26 NOTE — PLAN OF CARE
Goal Outcome Evaluation:              Outcome Evaluation: Pt A&Ox4, having visual hallucinations, magic barrier cream, amlactin cream applied, on lovenox, 2 L nc tonight, ACHS, rested well.

## 2024-04-26 NOTE — PROGRESS NOTES
[facility] Progress Note      Patient Name: Halie Guidry  : 1940  MRN: [id number]  Primary Care Physician:  Napoleon Mead MD  Date of admission: 2024    Subjective     Interval History:   Follow-up acute kidney injury on chronic kidney disease    The lower extremity pain has improved but the patient stated that she has less visual hallucination, no chest pain or shortness of air, no orthopnea or PND.    Review of Systems:   As noted above    Objective     Vitals:   Temp:  [97.5 °F (36.4 °C)-98.6 °F (37 °C)] 97.5 °F (36.4 °C)  Heart Rate:  [69-76] 72  Resp:  [18] 18  BP: ()/(53-95) 139/95  Flow (L/min):  [3] 3    Intake/Output Summary (Last 24 hours) at 2024 1006  Last data filed at 2024 0759  Gross per 24 hour   Intake 840 ml   Output 1600 ml   Net -760 ml       Physical Exam:    General Appearance: alert, awake, chronically ill, no acute distress   Skin: warm and dry  HEENT: oral mucosa normal, nonicteric sclera  Neck: supple, no JVD  Lungs: CTA, unlabored breathing effort  Heart: RRR, normal S1 and S2  Abdomen: soft, nontender, nondistended  : no palpable bladder  Extremities: Chronic stasis dermatitis skin wrinkling and she has mycotic toenails, she has 1+ pitting edema  Neuro: normal speech and mental status     Scheduled Meds:     amLODIPine, 10 mg, Oral, Daily  ammonium lactate, 1 Application, Topical, BID  atorvastatin, 80 mg, Oral, Nightly  bisoprolol, 5 mg, Oral, Daily  castor oil-balsam peru, 1 Application, Topical, Q12H  DULoxetine, 30 mg, Oral, Daily  enoxaparin, 30 mg, Subcutaneous, Nightly  HYDROcodone-acetaminophen, 1 tablet, Oral, TID  hydrocortisone-bacitracin-zinc oxide-nystatin, 1 Application, Topical, BID  insulin glargine, 35 Units, Subcutaneous, Q12H  insulin lispro, 12 Units, Subcutaneous, TID With Meals  insulin lispro, 2-7 Units, Subcutaneous, 4x Daily AC & at Bedtime  levothyroxine, 112 mcg, Oral, Once per day on Monday  Tuesday Wednesday Thursday Friday Saturday  levothyroxine, 168 mcg, Oral, Every Sunday  pantoprazole, 40 mg, Oral, Q AM  polyethylene glycol, 17 g, Oral, Daily  pregabalin, 50 mg, Oral, BID  sodium chloride, 10 mL, Intravenous, Q12H  torsemide, 40 mg, Oral, Daily  cyanocobalamin, 1,000 mcg, Oral, Daily      IV Meds:   Pharmacy to Dose enoxaparin (LOVENOX),           Results Reviewed:   I have personally reviewed the results from the time of this admission to 4/26/2024 10:06 EDT     Results from last 7 days   Lab Units 04/26/24  0533 04/25/24  0736 04/24/24  0614 04/23/24  0547   SODIUM mmol/L 137  137 136 136 134*   POTASSIUM mmol/L 3.7  3.7 3.7 3.4* 3.7   CHLORIDE mmol/L 99  99 98 98 97*   CO2 mmol/L 23.4  23.4 23.7 25.0 23.0   BUN mg/dL 61*  61* 53* 44* 37*   CREATININE mg/dL 2.44*  2.44* 2.37* 2.04* 1.80*   CALCIUM mg/dL 8.8  8.8 9.3 8.3* 8.6   BILIRUBIN mg/dL  --  0.4 0.3 0.3   ALK PHOS U/L  --  104 90 97   ALT (SGPT) U/L  --  9 9 9   AST (SGOT) U/L  --  14 10 10   GLUCOSE mg/dL 177*  177* 178* 279* 284*       Estimated Creatinine Clearance: 19.5 mL/min (A) (by C-G formula based on SCr of 2.44 mg/dL (H)).    Results from last 7 days   Lab Units 04/26/24  0533 04/25/24  0736 04/24/24  0614   MAGNESIUM mg/dL 2.1 2.3 1.5*   PHOSPHORUS mg/dL 5.4* 5.4* 4.8*       Results from last 7 days   Lab Units 04/26/24  0533 04/25/24  0736 04/24/24  0614 04/23/24  0547 04/22/24  0703   URIC ACID mg/dL 9.4* 9.0* 9.5* 8.9* 8.4*       Results from last 7 days   Lab Units 04/26/24  0533 04/25/24  0736 04/24/24  0614 04/23/24  0547 04/22/24  0703   WBC 10*3/mm3 9.82 8.84 8.40 8.40 8.76   HEMOGLOBIN g/dL 11.6* 12.6 11.1* 11.6* 11.6*   PLATELETS 10*3/mm3 283 305 289 269 252             Assessment / Plan     ASSESSMENT:  Acute kidney injury associated with volume depletion and inability to autoregulate since the patient was taking ACE inhibitor renal function improving since admission creatinine slightly increased to 2.44,  electrolyte within acceptable range and uric acid is 9.4  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    Diabetes mellitus type 2 with renal complication  Diabetic neuropathy, very painful Lyrica was adjusted by primary team  Hypothyroidism      PLAN:  I will decrease the torsemide to 20 mg daily  Monitor for diuretic toxicity  Surveillance labs    I reviewed the chart and other providers notes, reviewed labs.  Discussed the case with the patient and she voiced good understanding, I discussed the case with Dr. Longoria  Copied text in this note has been reviewed and is accurate as of 04/26/24.       Thank you for involving us in the care of Halie Guidry.  Please feel free to call with any questions.    Chidi Lanier MD  04/26/24  10:06 EDT    [facility]  [phone]    Please note that portions of this note were completed with a voice recognition program.

## 2024-04-26 NOTE — THERAPY TREATMENT NOTE
Patient Name: Halie Guidry  : 1940    MRN: [id number]                              Today's Date: 2024       Admit Date: 2024    Visit Dx:     ICD-10-CM ICD-9-CM   1. Uncontrolled type 1 diabetes mellitus with hyperglycemia  E10.65 250.83     790.29   2. Acute renal failure, unspecified acute renal failure type  N17.9 584.9     Patient Active Problem List   Diagnosis    Compression fracture    Lumbar degenerative disc disease    Type 2 diabetes mellitus with hyperglycemia, with long-term current use of insulin    Hyperlipidemia    Benign essential hypertension    Primary hypothyroidism    Neuropathy    Osteoporosis    Proteinuria    Tobacco abuse    Diabetic eye exam    Generalized weakness    Spinal stenosis    Scoliosis    Arthritis    VBI (vertebrobasilar insufficiency)    Orthostatic hypotension    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    Severe hypothyroidism    Noncompliance with medication regimen    Vitamin D deficiency disease    Altered mental status    Tremor    Seizure    Stage 3a chronic kidney disease    Thoracic degenerative disc disease    Metabolic encephalopathy    VIPUL (acute kidney injury)    Hyperglycemia    Type II diabetes mellitus, uncontrolled    Lower abdominal pain    Transaminitis    UTI (urinary tract infection)    Emphysematous cystitis    Choledocholithiasis    Hypokalemia    Hypoxia    Generalized abdominal pain    History of Clostridium difficile infection    History of ERCP    Acute UTI (urinary tract infection)    Hypomagnesemia    Burning pain    Anxiety disorder    Neuropathic pain    Intertrigo    Weakness of both lower extremities    Accelerated hypertension    Acute cystitis without hematuria    Altered mental status, unspecified altered mental status type    Left lower lobe pneumonia    Acute pain of left foot    Cellulitis and abscess of left lower extremity    Hypertensive kidney disease with stage 3a chronic kidney disease     Bilateral leg pain    Peripheral edema    Primary malignant neuroendocrine tumor of pancreas    Encounter for long-term (current) use of high-risk medication    Diabetic foot ulcer    Acute renal insufficiency    C. difficile diarrhea    Sepsis    Stage 3a chronic kidney disease    Metabolic encephalopathy    Pressure injury of buttock, stage 2    Uncontrolled diabetes mellitus with hyperglycemia    HTN (hypertension)    Pseudohyponatremia     Past Medical History:   Diagnosis Date    Acute metabolic encephalopathy 6/24/2022    Anxiety     Arthritis     Benign essential hypertension 08/20/2014    Bleeding disorder     Depression     Diabetes     Diabetes mellitus, type 2     Disc degeneration, lumbar     Headache, tension-type     Hyperlipidemia     Hypothyroidism     Neuropathy     Osteoporosis 09/09/2015    Peripheral neuropathy     Rotator cuff tear, left     Scoliosis     Shoulder pain     LEFT, TORN ROTATOR CUFF S/P FALL    Spinal stenosis      Past Surgical History:   Procedure Laterality Date    APPENDECTOMY      BILATERAL BREAST REDUCTION Bilateral 08/2015    CATARACT EXTRACTION  03/2015    CHOLECYSTECTOMY WITH INTRAOPERATIVE CHOLANGIOGRAM N/A 3/27/2022    Procedure: CHOLECYSTECTOMY LAPAROSCOPIC INTRAOPERATIVE CHOLANGIOGRAM;  Surgeon: Aiyana Hill MD;  Location: [facility] OR;  Service: General;  Laterality: N/A;    COLONOSCOPY  06/05/2015    WNL    ERCP N/A 2/25/2022    Procedure: ENDOSCOPIC RETROGRADE CHOLANGIOPANCREATOGRAPHY with sphincterotomy and balloon sweep;  Surgeon: Chilo Wilhelm MD;  Location: [facility] ENDOSCOPY;  Service: Gastroenterology;  Laterality: N/A;  PRE/POST - CBD stones    ERCP N/A 3/28/2022    Procedure: ENDOSCOPIC RETROGRADE CHOLANGIOPANCREATOGRAPHY WITH SPHINCTEROTOMY AND BALLOON SWEEP;  Surgeon: Chilo Wilhelm MD;  Location: [facility] ENDOSCOPY;  Service: Gastroenterology;  Laterality: N/A;  PRE: COMMON DUCT STONE  POST: COMMON DUCT STONE    KYPHOPLASTY      REDUCTION  MAMMAPLASTY      TONSILLECTOMY        General Information       Row Name 04/26/24 0847          Physical Therapy Time and Intention    Document Type therapy note (daily note)  -JOSE ALBERTO     Mode of Treatment individual therapy;physical therapy  -       Row Name 04/26/24 0847          General Information    Patient Profile Reviewed yes  -               User Key  (r) = Recorded By, (t) = Taken By, (c) = Cosigned By      Initials Name Provider Type    Beena Downs PTA Physical Therapist Assistant                   Mobility       Row Name 04/26/24 0847          Bed Mobility    Comment, (Bed Mobility) not tested at this time, MD visits and need to get cleaned up , will check back later for mobility, agreed to bed exer x15 reps  -               User Key  (r) = Recorded By, (t) = Taken By, (c) = Cosigned By      Initials Name Provider Type    Beena Downs PTA Physical Therapist Assistant                   Obj/Interventions       Row Name 04/26/24 1456          Motor Skills    Therapeutic Exercise --  APs, QS, GS, LAQS x15  -               User Key  (r) = Recorded By, (t) = Taken By, (c) = Cosigned By      Initials Name Provider Type    Beena Downs PTA Physical Therapist Assistant                   Goals/Plan    No documentation.                  Clinical Impression       Row Name 04/26/24 0850          Pain    Pretreatment Pain Rating 0/10 - no pain  -     Posttreatment Pain Rating 0/10 - no pain  -     Pre/Posttreatment Pain Comment still grimaces with sheet touching LEs  -     Pain Intervention(s) Repositioned  -       Row Name 04/26/24 0850          Plan of Care Review    Progress improving  -               User Key  (r) = Recorded By, (t) = Taken By, (c) = Cosigned By      Initials Name Provider Type    Beena Downs PTA Physical Therapist Assistant                   Outcome Measures    No documentation.                                Physical Therapy Education       Title:  PT OT SLP Therapies (Done)       Topic: Physical Therapy (Done)       Point: Mobility training (Done)       Learning Progress Summary             Patient Acceptance, E,TB,D, VU,NR by  at 4/25/2024 1544    Acceptance, E,D, NR by  at 4/24/2024 1721    Acceptance, E,D, VU,NR by JOSE ALBERTO at 4/23/2024 1716    Acceptance, E, NR by  at 4/21/2024 1513                         Point: Home exercise program (Done)       Learning Progress Summary             Patient Acceptance, E,TB,D, VU,NR by  at 4/25/2024 1544    Acceptance, E,D, NR by JOSE ALBERTO at 4/24/2024 1721    Acceptance, E,D, VU,NR by  at 4/23/2024 1716    Acceptance, E, NR by  at 4/21/2024 1513                                         User Key       Initials Effective Dates Name Provider Type [facility] 03/07/18 -  Beena Ray PTA Physical Therapist Assistant PT     07/11/23 -  Helene Hernandez PT Physical Therapist PT                  PT Recommendation and Plan     Plan of Care Reviewed With: patient  Progress: improving  Outcome Evaluation: Pt agreed to PT session, limited steps toward HOB due to hallucinations and limited knee flex bilat; pt unsafe to take steps forw away from bed currently, plans SNU at [place] , unsafe to return home at current level of assist, still requires assist of 2 for safety     Time Calculation:         PT Charges       Row Name 04/26/24 1458             Time Calculation    Start Time 0845  -      Stop Time 0901  -      Time Calculation (min) 16 min  -                User Key  (r) = Recorded By, (t) = Taken By, (c) = Cosigned By      Initials Name Provider Type     Beena Ray PTA Physical Therapist Assistant                  Therapy Charges for Today       Code Description Service Date Service Provider Modifiers Qty    78129324260 HC PT THERAPEUTIC ACT EA 15 MIN 4/25/2024 Beena Ray PTA GP 3    93455018546 HC PT THER SUPP EA 15 MIN 4/25/2024 Beena Ray PTA GP 3    02261428947 HC PT THER PROC EA 15 MIN  4/26/2024 Beena Ray, LOKI GP 1            PT G-Codes  AM-PAC 6 Clicks Score (PT): 13  PT Discharge Summary  Anticipated Discharge Disposition (PT): skilled nursing facility    Beena Ray PTA  4/26/2024

## 2024-04-26 NOTE — THERAPY TREATMENT NOTE
Patient Name: Halie Guidry  : 1940    MRN: [id number]                              Today's Date: 2024       Admit Date: 2024    Visit Dx:     ICD-10-CM ICD-9-CM   1. Uncontrolled type 1 diabetes mellitus with hyperglycemia  E10.65 250.83     790.29   2. Acute renal failure, unspecified acute renal failure type  N17.9 584.9     Patient Active Problem List   Diagnosis    Compression fracture    Lumbar degenerative disc disease    Type 2 diabetes mellitus with hyperglycemia, with long-term current use of insulin    Hyperlipidemia    Benign essential hypertension    Primary hypothyroidism    Neuropathy    Osteoporosis    Proteinuria    Tobacco abuse    Diabetic eye exam    Generalized weakness    Spinal stenosis    Scoliosis    Arthritis    VBI (vertebrobasilar insufficiency)    Orthostatic hypotension    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    Severe hypothyroidism    Noncompliance with medication regimen    Vitamin D deficiency disease    Altered mental status    Tremor    Seizure    Stage 3a chronic kidney disease    Thoracic degenerative disc disease    Metabolic encephalopathy    VIPUL (acute kidney injury)    Hyperglycemia    Type II diabetes mellitus, uncontrolled    Lower abdominal pain    Transaminitis    UTI (urinary tract infection)    Emphysematous cystitis    Choledocholithiasis    Hypokalemia    Hypoxia    Generalized abdominal pain    History of Clostridium difficile infection    History of ERCP    Acute UTI (urinary tract infection)    Hypomagnesemia    Burning pain    Anxiety disorder    Neuropathic pain    Intertrigo    Weakness of both lower extremities    Accelerated hypertension    Acute cystitis without hematuria    Altered mental status, unspecified altered mental status type    Left lower lobe pneumonia    Acute pain of left foot    Cellulitis and abscess of left lower extremity    Hypertensive kidney disease with stage 3a chronic kidney disease     Bilateral leg pain    Peripheral edema    Primary malignant neuroendocrine tumor of pancreas    Encounter for long-term (current) use of high-risk medication    Diabetic foot ulcer    Acute renal insufficiency    C. difficile diarrhea    Sepsis    Stage 3a chronic kidney disease    Metabolic encephalopathy    Pressure injury of buttock, stage 2    Uncontrolled diabetes mellitus with hyperglycemia    HTN (hypertension)    Pseudohyponatremia     Past Medical History:   Diagnosis Date    Acute metabolic encephalopathy 6/24/2022    Anxiety     Arthritis     Benign essential hypertension 08/20/2014    Bleeding disorder     Depression     Diabetes     Diabetes mellitus, type 2     Disc degeneration, lumbar     Headache, tension-type     Hyperlipidemia     Hypothyroidism     Neuropathy     Osteoporosis 09/09/2015    Peripheral neuropathy     Rotator cuff tear, left     Scoliosis     Shoulder pain     LEFT, TORN ROTATOR CUFF S/P FALL    Spinal stenosis      Past Surgical History:   Procedure Laterality Date    APPENDECTOMY      BILATERAL BREAST REDUCTION Bilateral 08/2015    CATARACT EXTRACTION  03/2015    CHOLECYSTECTOMY WITH INTRAOPERATIVE CHOLANGIOGRAM N/A 3/27/2022    Procedure: CHOLECYSTECTOMY LAPAROSCOPIC INTRAOPERATIVE CHOLANGIOGRAM;  Surgeon: Aiyana Hill MD;  Location: [facility] OR;  Service: General;  Laterality: N/A;    COLONOSCOPY  06/05/2015    WNL    ERCP N/A 2/25/2022    Procedure: ENDOSCOPIC RETROGRADE CHOLANGIOPANCREATOGRAPHY with sphincterotomy and balloon sweep;  Surgeon: Chilo Wilhelm MD;  Location: [facility] ENDOSCOPY;  Service: Gastroenterology;  Laterality: N/A;  PRE/POST - CBD stones    ERCP N/A 3/28/2022    Procedure: ENDOSCOPIC RETROGRADE CHOLANGIOPANCREATOGRAPHY WITH SPHINCTEROTOMY AND BALLOON SWEEP;  Surgeon: Chilo Wilhelm MD;  Location: [facility] ENDOSCOPY;  Service: Gastroenterology;  Laterality: N/A;  PRE: COMMON DUCT STONE  POST: COMMON DUCT STONE    KYPHOPLASTY      REDUCTION  MAMMAPLASTY      TONSILLECTOMY        General Information       Row Name 04/26/24 1638 04/26/24 0847       Physical Therapy Time and Intention    Document Type therapy note (daily note)  - therapy note (daily note)  -    Mode of Treatment individual therapy;physical therapy  - individual therapy;physical therapy  -      Row Name 04/26/24 1638 04/26/24 0847       General Information    Patient Profile Reviewed yes  - yes  -    Existing Precautions/Restrictions fall  - --      [facility] Name 04/26/24 1638          Cognition    Orientation Status (Cognition) oriented x 3  seeing things not there in room, but is aware she is  -[facility] Name 04/26/24 1638          Safety Issues, Functional Mobility    Impairments Affecting Function (Mobility) balance;cognition;coordination;endurance/activity tolerance;strength;sensation/sensory awareness  -               User Key  (r) = Recorded By, (t) = Taken By, (c) = Cosigned By      Initials Name Provider Type    Beena Downs PTA Physical Therapist Assistant                   Mobility       Row Name 04/26/24 1638 04/26/24 0847       Bed Mobility    Supine-Sit [place] (Bed Mobility) 2 person assist;moderate assist (50% patient effort)  - --    Sit-Supine [place] (Bed Mobility) 2 person assist;minimum assist (75% patient effort)  - --    Assistive Device (Bed Mobility) bed rails;draw sheet;head of bed elevated  - --    Comment, (Bed Mobility) -- not tested at this time, MD visits and need to get cleaned up , will check back later for mobility, agreed to bed exer x15 reps  -[facility] Name 04/26/24 1638          Sit-Stand Transfer    Sit-Stand [place] (Transfers) 2 person assist;moderate assist (50% patient effort);verbal cues;nonverbal cues (demo/gesture)  -     Assistive Device (Sit-Stand Transfers) walker, front-wheeled  -       Row Name 04/26/24 1638          Gait/Stairs (Locomotion)    [place] Level (Gait) 2 person  assist;moderate assist (50% patient effort)  -     Assistive Device (Gait) walker, front-wheeled  -     Deviations/Abnormal Patterns (Gait) festinating/shuffling  -     Bilateral Gait Deviations forward flexed posture  -     Left Sided Gait Deviations decreased knee extension  -     Right Sided Gait Deviations decreased knee extension  -     Comment, (Gait/Stairs) cues to lock in knees, still unsteady, shaky today, only able to take side -steps for safety , seated rest  -               User Key  (r) = Recorded By, (t) = Taken By, (c) = Cosigned By      Initials Name Provider Type    Beena Downs PTA Physical Therapist Assistant                   Obj/Interventions       Row Name 04/26/24 1639 04/26/24 1456       Motor Skills    Therapeutic Exercise --  hip abd/add, HS, QS x15 reps-finished where she left off earlier  - --  APs, QS, GS, LAQS x15  -JM              User Key  (r) = Recorded By, (t) = Taken By, (c) = Cosigned By      Initials Name Provider Type    Beena Downs PTA Physical Therapist Assistant                   Goals/Plan    No documentation.                  Clinical Impression       Row Name 04/26/24 0850          Pain    Pretreatment Pain Rating 0/10 - no pain  -     Posttreatment Pain Rating 0/10 - no pain  -     Pre/Posttreatment Pain Comment still grimaces with sheet touching LEs  -     Pain Intervention(s) Repositioned  -       Row Name 04/26/24 1640 04/26/24 0850       Plan of Care Review    Progress -- improving  -    Outcome Evaluation Pt agreed to PT session, did report fatigue, but no hallucinations during session, pt suzanne bed mob w/assist of 2, STS w/mOD 2, 3 side-steps , seated rest then another 2 side-steps , more cues required for knee ext this date, pt also incr forw flex to unsafe level requiring the encouraged rest EOB before continuing session, pt suzanne LE exer in supine x15 reps , will need SNU as pt was living at home w/son assisting, and is  below her PLOF currently  - --      Row Name 04/26/24 1640          Therapy Assessment/Plan (PT)    Rehab Potential (PT) fair, will monitor progress closely  -JOSE ALBERTO     Criteria for Skilled Interventions Met (PT) yes  -       Row Name 04/26/24 1640          Positioning and Restraints    Pre-Treatment Position in bed  -JM     Post Treatment Position bed  -JM     In Bed fowlers;call light within reach;encouraged to call for assist;exit alarm on;notified nsg  -JOSE ALBERTO               User Key  (r) = Recorded By, (t) = Taken By, (c) = Cosigned By      Initials Name Provider Type    Beena Downs PTA Physical Therapist Assistant                   Outcome Measures       Row Name 04/26/24 1655          How much help from another person do you currently need...    Turning from your back to your side while in flat bed without using bedrails? 3  -JM     Moving from lying on back to sitting on the side of a flat bed without bedrails? 2  -JM     Moving to and from a bed to a chair (including a wheelchair)? 2  to bsc  -JM     Standing up from a chair using your arms (e.g., wheelchair, bedside chair)? 2  -JM     Climbing 3-5 steps with a railing? 1  -JM     To walk in hospital room? 1  -JM     AM-PAC 6 Clicks Score (PT) 11  -JM     Highest Level of Mobility Goal 4 --> Transfer to chair/commode  -               User Key  (r) = Recorded By, (t) = Taken By, (c) = Cosigned By      Initials Name Provider Type    Beena Downs PTA Physical Therapist Assistant                                 Physical Therapy Education       Title: PT OT SLP Therapies (In Progress)       Topic: Physical Therapy (In Progress)       Point: Mobility training (In Progress)       Learning Progress Summary             Patient Acceptance, E,D, NR by JOSE ALBERTO at 4/26/2024 1657    Acceptance, E,TB,D, VU,NR by JOSE ALBERTO at 4/25/2024 1544    Acceptance, E,D, NR by JOSE ALBERTO at 4/24/2024 1721    Acceptance, E,D, VU,NR by JOSE ALBERTO at 4/23/2024 1716    Acceptance, E, NR by  at  4/21/2024 1513                         Point: Home exercise program (In Progress)       Learning Progress Summary             Patient Acceptance, E,D, NR by  at 4/26/2024 1657    Acceptance, E,TB,D, VU,NR by  at 4/25/2024 1544    Acceptance, E,D, NR by  at 4/24/2024 1721    Acceptance, E,D, VU,NR by  at 4/23/2024 1716    Acceptance, E, NR by  at 4/21/2024 1513                                         User Key       Initials Effective Dates Name Provider Type [facility] 03/07/18 -  Beena Ray PTA Physical Therapist Assistant PT     07/11/23 -  Helene Hernandez PT Physical Therapist PT                  PT Recommendation and Plan     Plan of Care Reviewed With: patient  Progress: improving  Outcome Evaluation: Pt agreed to PT session, did report fatigue, but no hallucinations during session, pt suzanne bed mob w/assist of 2, STS w/mOD 2, 3 side-steps , seated rest then another 2 side-steps , more cues required for knee ext this date, pt also incr forw flex to unsafe level requiring the encouraged rest EOB before continuing session, pt suzanne LE exer in supine x15 reps , will need SNU as pt was living at home w/son assisting, and is below her PLOF currently     Time Calculation:         PT Charges       Row Name 04/26/24 1657 04/26/24 1458          Time Calculation    Start Time 1454  - 0845  -     Stop Time 1535  - 0901  -     Time Calculation (min) 41 min  - 16 min  -     PT Received On 04/26/24  -JOSE ALBERTO --     PT - Next Appointment 04/29/24  -JOSE ALBERTO --               User Key  (r) = Recorded By, (t) = Taken By, (c) = Cosigned By      Initials Name Provider Type    Beena Downs PTA Physical Therapist Assistant                  Therapy Charges for Today       Code Description Service Date Service Provider Modifiers Qty    80951379131 HC PT THERAPEUTIC ACT EA 15 MIN 4/25/2024 Beena Ray PTA GP 3    43846191549 HC PT THER SUPP EA 15 MIN 4/25/2024 Beena Ray PTA GP 3     85657395332 HC PT THER PROC EA 15 MIN 4/26/2024 Beena Ray, PTA GP 1    73916026028 HC PT THERAPEUTIC ACT EA 15 MIN 4/26/2024 Beena Ray, LOKI GP 2    85892107708 HC PT THER PROC EA 15 MIN 4/26/2024 Beena Ray, PTA GP 1    85255139291 HC PT THER SUPP EA 15 MIN 4/26/2024 Beena Ray, LOKI GP 2            PT G-Codes  AM-PAC 6 Clicks Score (PT): 11  PT Discharge Summary  Anticipated Discharge Disposition (PT): skilled nursing facility    Beena Ray PTA  4/26/2024

## 2024-04-26 NOTE — PROGRESS NOTES
The patient reports that her visual hallucinations have resolved and she is in much brighter spirits when seen today.  Charting indicates possible discharge later today.

## 2024-04-26 NOTE — PROGRESS NOTES
Name: Halie Guidry ADMIT: 2024   : 1940  PCP: Napoleon Mead MD    MRN: [id number] LOS: 5 days   AGE/SEX: 83 y.o. female  ROOM: [facility]     Subjective   Subjective   Feels a bit better after having BM. Not hallucinating this morning which she is thankful for. Still c/o bilat leg pain        Objective   Objective   Vital Signs  Temp:  [97.5 °F (36.4 °C)-98.6 °F (37 °C)] 97.5 °F (36.4 °C)  Heart Rate:  [69-76] 72  Resp:  [18] 18  BP: ()/(53-95) 139/95  SpO2:  [94 %-96 %] 94 %  on  Flow (L/min):  [3] 3;   Device (Oxygen Therapy): nasal cannula  Body mass index is 29.04 kg/m².  Physical Exam  Vitals reviewed.   Constitutional:       General: She is not in acute distress.     Appearance: She is obese.   HENT:      Head: Normocephalic and atraumatic.   Eyes:      General: No scleral icterus.  Cardiovascular:      Rate and Rhythm: Normal rate and regular rhythm.      Pulses: Normal pulses.      Heart sounds: Normal heart sounds. No murmur heard.  Pulmonary:      Effort: Pulmonary effort is normal. No respiratory distress.      Breath sounds: Normal breath sounds.   Abdominal:      General: There is no distension.      Palpations: Abdomen is soft.      Tenderness: There is no abdominal tenderness.   Musculoskeletal:         General: Tenderness (Both LEs) present. No swelling.   Skin:     General: Skin is warm and dry.      Coloration: Skin is not jaundiced.      Findings: No rash.   Neurological:      Mental Status: She is alert.      Comments: Mildly confused   Psychiatric:         Mood and Affect: Mood normal.         Behavior: Behavior normal.       Results Review     I reviewed the patient's new clinical results.  Results from last 7 days   Lab Units 24  0533 24  0736 24  0614 24  0547   WBC 10*3/mm3 9.82 8.84 8.40 8.40   HEMOGLOBIN g/dL 11.6* 12.6 11.1* 11.6*   PLATELETS 10*3/mm3 283 305 289 269     Results from last 7 days   Lab Units 24  0533 24  0736  04/24/24  0614 04/23/24  0547   SODIUM mmol/L 137  137 136 136 134*   POTASSIUM mmol/L 3.7  3.7 3.7 3.4* 3.7   CHLORIDE mmol/L 99  99 98 98 97*   CO2 mmol/L 23.4  23.4 23.7 25.0 23.0   BUN mg/dL 61*  61* 53* 44* 37*   CREATININE mg/dL 2.44*  2.44* 2.37* 2.04* 1.80*   GLUCOSE mg/dL 177*  177* 178* 279* 284*   EGFR mL/min/1.73 19.2*  19.2* 19.9* 23.8* 27.7*     Results from last 7 days   Lab Units 04/26/24  0533 04/25/24  0736 04/24/24  0614 04/23/24  0547 04/22/24  0703   ALBUMIN g/dL 3.2* 3.6 3.4* 3.4* 3.3*   BILIRUBIN mg/dL  --  0.4 0.3 0.3 0.4   ALK PHOS U/L  --  104 90 97 104   AST (SGOT) U/L  --  14 10 10 11   ALT (SGPT) U/L  --  9 9 9 <5     Results from last 7 days   Lab Units 04/26/24  0533 04/25/24  0736 04/24/24  0614 04/23/24  0547   CALCIUM mg/dL 8.8  8.8 9.3 8.3* 8.6   ALBUMIN g/dL 3.2* 3.6 3.4* 3.4*   MAGNESIUM mg/dL 2.1 2.3 1.5* 1.9   PHOSPHORUS mg/dL 5.4* 5.4* 4.8* 3.7     Results from last 7 days   Lab Units 04/20/24  2050   PROCALCITONIN ng/mL 0.10     Glucose   Date/Time Value Ref Range Status   04/26/2024 0620 180 (H) 70 - 130 mg/dL Final   04/25/2024 1950 144 (H) 70 - 130 mg/dL Final   04/25/2024 1611 220 (H) 70 - 130 mg/dL Final   04/25/2024 1106 297 (H) 70 - 130 mg/dL Final   04/25/2024 0547 189 (H) 70 - 130 mg/dL Final   04/24/2024 2048 219 (H) 70 - 130 mg/dL Final   04/24/2024 1610 209 (H) 70 - 130 mg/dL Final       No radiology results for the last day    I have personally reviewed all medications:  Scheduled Medications  amLODIPine, 10 mg, Oral, Daily  ammonium lactate, 1 Application, Topical, BID  atorvastatin, 80 mg, Oral, Nightly  bisoprolol, 5 mg, Oral, Daily  castor oil-balsam peru, 1 Application, Topical, Q12H  DULoxetine, 30 mg, Oral, Daily  enoxaparin, 30 mg, Subcutaneous, Nightly  HYDROcodone-acetaminophen, 1 tablet, Oral, TID  hydrocortisone-bacitracin-zinc oxide-nystatin, 1 Application, Topical, BID  insulin glargine, 35 Units, Subcutaneous, Q12H  insulin lispro, 12  Units, Subcutaneous, TID With Meals  insulin lispro, 2-7 Units, Subcutaneous, 4x Daily AC & at Bedtime  levothyroxine, 112 mcg, Oral, Once per day on Monday Tuesday Wednesday Thursday Friday Saturday  levothyroxine, 168 mcg, Oral, Every Sunday  pantoprazole, 40 mg, Oral, Q AM  polyethylene glycol, 17 g, Oral, Daily  pregabalin, 50 mg, Oral, BID  sodium chloride, 10 mL, Intravenous, Q12H  [START ON 4/27/2024] torsemide, 20 mg, Oral, Daily  cyanocobalamin, 1,000 mcg, Oral, Daily    Infusions  Pharmacy to Dose enoxaparin (LOVENOX),     Diet  Diet: Diabetic; Consistent Carbohydrate; Fluid Consistency: Thin (IDDSI 0)    I have personally reviewed:  [x]  Laboratory   []  Microbiology   []  Radiology   []  EKG/Telemetry  []  Cardiology/Vascular   []  Pathology    []  Records       Assessment/Plan     Active Hospital Problems    Diagnosis  POA    **Uncontrolled diabetes mellitus with hyperglycemia [E11.65]  Yes    HTN (hypertension) [I10]  Unknown    Pseudohyponatremia [R79.89]  Unknown    Hypertensive kidney disease with stage 3a chronic kidney disease [I12.9, N18.31]  Yes    VIPUL (acute kidney injury) [N17.9]  Yes    Primary hypothyroidism [E03.9]  Yes      Resolved Hospital Problems   No resolved problems to display.       83 y.o. female with history of neuroendocrine tumor admitted with confusion and Uncontrolled diabetes mellitus with hyperglycemia.    DM2 with hyperglycemia-better control, will not change regimen today.  - SSI available prn    Diabetic neuropathy unctrl. Hyperglycemia will worsen this as well.  - Reviewed Hong and Lyrica dosing.  Renal function precludes 3 times daily dosing apparently.  I will increase to 75 twice daily which appears to be within renal dose limits.      VIPUL/CKD 3a: Continues to creep upward.  Discussed with Dr. Lanier.  Decreasing dose of Demadex slightly but she already received 40 mg today.  Following BMP daily.      Neuroendocrine tumor followed by CBC. Receiving octreotide  monthly.  Due for injection soon    Hallucinations improved today. ?Metabolic per psychiatry.    Continue bowel regimen though will switch to prn only.  Does not look like she ever actually got scheduled MiraLAX (refused).      Lovenox  Dispo: SNF recommended, but issue is octreotide injections.  CCP trying to work out arrangements for SNF placement.  Unclear if patient can be managed at home.    Lewis Longoria MD  [facility]  04/26/24  10:47 EDT

## 2024-04-26 NOTE — PROGRESS NOTES
REASON FOR FOLLOWUP/CHIEF COMPLAINT:  Pancreatic neuroendocrine tumor    HISTORY OF PRESENT ILLNESS:   No new issues overnight.  States she is not having any hallucinations currently.  She is glad about this.  Past Medical History, Past Surgical History, Social History, Family History have been reviewed and are without significant changes except as mentioned.    Review of Systems   Review of Systems   Constitutional:  Negative for activity change.   HENT:  Negative for nosebleeds and trouble swallowing.    Respiratory:  Negative for shortness of breath and wheezing.    Cardiovascular:  Negative for chest pain and palpitations.   Gastrointestinal:  Negative for diarrhea and nausea.   Genitourinary:  Negative for dysuria and hematuria.   Musculoskeletal:  Negative for arthralgias and myalgias.   Skin:  Negative for rash and wound.   Neurological:  Negative for seizures and syncope.   Hematological:  Negative for adenopathy. Does not bruise/bleed easily.   Psychiatric/Behavioral:  Negative for confusion.        Medications:  The current medication list was reviewed in the EMR    ALLERGIES:    Allergies   Allergen Reactions    Sulfa Antibiotics Unknown - High Severity     Pt says it was a long time ago and I don't remember but it wasn't good.               Vitals:    04/25/24 1350 04/25/24 1947 04/25/24 2326 04/26/24 0759   BP: 104/53 104/55 95/55 139/95   BP Location: Left arm Left arm Left arm Left arm   Patient Position: Lying Lying Lying Lying   Pulse: 75 76 69 72   Resp: 18 18 18 18   Temp: 97.5 °F (36.4 °C) 98.6 °F (37 °C) 97.9 °F (36.6 °C) 97.5 °F (36.4 °C)   TempSrc: Oral Oral Oral Oral   SpO2: 96% 95% 95% 94%   Weight:       Height:         Physical Exam    CONSTITUTIONAL:  Vital signs reviewed.  No distress, looks comfortable.  EYES:  Conjunctivae and lids unremarkable.  PERRLA  EARS, NOSE, MOUTH, THROAT:  Ears and nose appear unremarkable.  Lips, teeth, gums appear unremarkable.  RESPIRATORY:  Normal  respiratory effort.  Lungs clear to auscultation bilaterally.  CARDIOVASCULAR:  Normal S1, S2.  No murmurs, rubs or gallops.  No significant lower extremity edema.  GASTROINTESTINAL: Abdomen appears unremarkable.  Nontender.  No hepatomegaly.  No splenomegaly.  NEURO: Cranial nerves 2-12 grossly intact.  No focal deficits.  Appears to have equal strength all 4 extremities.  MUSCULOSKELETAL:  Unremarkable digits/nails.  No cyanosis or clubbing.  SKIN:  Warm.  No rashes.  PSYCHIATRIC:  Normal judgment and insight.  Normal mood and affect.      RECENT LABS:  WBC   Date Value Ref Range Status   04/26/2024 9.82 3.40 - 10.80 10*3/mm3 Final   04/25/2024 8.84 3.40 - 10.80 10*3/mm3 Final   04/24/2024 8.40 3.40 - 10.80 10*3/mm3 Final     Hemoglobin   Date Value Ref Range Status   04/26/2024 11.6 (L) 12.0 - 15.9 g/dL Final   04/25/2024 12.6 12.0 - 15.9 g/dL Final   04/24/2024 11.1 (L) 12.0 - 15.9 g/dL Final     Platelets   Date Value Ref Range Status   04/26/2024 283 140 - 450 10*3/mm3 Final   04/25/2024 305 140 - 450 10*3/mm3 Final   04/24/2024 289 140 - 450 10*3/mm3 Final       ASSESSMENT/PLAN:  Halie Guidry [id number]     *Well-differentiated pancreatic neuroendocrine tumor   Incidentally noted to have a hyperenhancing pancreatic head mass 3.6 cm with an adjacent 2.8 cm exophytic component or lymph node suspicious for NET radiographically by CT 7/26/2023  Laboratory studies showed a glucagon of 143, gastrin 236, somatostatin 21, CEA 3.09, CA 19-9 44.3, chromogranin A 2296, proinsulin 6.3, insulin 7.3, pancreatic polypeptide 643.6.  EGD on 8/4/2023 showing small hiatal hernia, erythema with erosions in the gastric antrum and body suggestive of erosive gastritis.  An endoscopic ultrasound was performed showing a pancreatic mass 3.5 x 3.0 cm in the pancreatic head and a second mass more circumscribed adjacently possibly communicating.  Fine-needle aspiration of the mass was performed showing a well-differentiated pancreatic  neuroendocrine tumor  9/13/2023 dotatate scan-3.9 cm pancreatic head mass SUV 41.8 adjacent peripancreatic lymph node 2.1 cm SUV 38.4  9/13/2023-initiated octreotide 20 mg IM monthly; dose increased to 30 mg monthly 11/13/2023 12/11/2023 reports her diarrhea is better controlled since the dose increase on octreotide last month.  Did not receive octreotide from 12/11/2023 - 3/25/2024 due to multiple hospitalizations.  3/25/2024-resume octreotide.  Having 1-2 episodes of diarrhea daily.    Monthly octreotide injections are for treatment of her cancer.  I checked with the oncology pharmacist.  Octreotide cannot be given as an inpatient.  She is to resume this upon discharge and continue it monthly.  Is reasonable to be off of it for 2 or 3 weeks but she does need to get started back on this with a plan to continue it monthly indefinitely (stop in the future if it clearly stops working)     *Chronic diarrhea x1 year, hyperglycemia (but diabetic)     *Normocytic, normochromic anemia-hemoglobin 11.3 likely secondary to CKD 3   B12 572, folate 10.9, ferritin 32, iron sat 11%   Hemoglobin improved at 11.6.      *Comorbidities include venous insufficiency, hypertension, hyperlipidemia, CKD, hypothyroidism, advanced age-PS ECOG 2     *Weight gain/lower extremity edema after discontinuation of diuretics few months ago from hospitalization.  Improvement in weight gain and lower extremity edema since reinitiating Lasix 20 mg daily on 11/13/2023.       *Hospitalized December 2023 for diabetic foot ulcer     *2 separate hospitalizations January 2024 related to C. difficile colitis.  Patient experienced VIPUL related to dehydration, improved with IV fluids.    *Anemia  Hb 11.6, from 12.6, from 11.1, from 11.6, from 11.6    *Hallucinations: Sees above AF food when she turns her head to the left.  Dr. Hanson, psychiatrist, states he does not think she is experiencing any true psychotic symptoms and does not feel this symptom  would be amenable to psychotropic medication  4/ 26/24: Not having these hallucinations today     *Social-patient is living in her condo.    Office note 3/25/2024: Son recently moved in since she is unable to care for herself after multiple hospital admissions.  He reports she is unable to perform any of her ADLs.  He is working full-time and having a difficult time managing her care on top of work.  She is having diarrhea 1-2 times daily and is incontinent.  Refuses to wear briefs.  They are both distressed about their current situation.  Request , Savita Bergeron, come and discuss options with them.  Will place referral for home health with PT/OT/nursing care.  She is also going to look into private care options for them.  4/24/24: Currently in the hospital.  I am told the plan is a long-term nursing facility.     Plan  Patient missed her appointment with Dr. Gutierrez on 4/22/2024 because she was hospitalized..  Follow-up will need to be arranged.  I asked our office to arrange an appointment with Dr. Gutierrez with an octreotide injection in 2 to 3 weeks.  As an outpatient, she needs to resume octreotide at 30 mg monthly IM.  This is the ongoing treatment for her cancer with no good alternatives    Chart reviewed and summarized including hospitalist note, nephrology note, and psychiatry note

## 2024-04-26 NOTE — PLAN OF CARE
Goal Outcome Evaluation:  Plan of Care Reviewed With: patient        Progress: improving  Outcome Evaluation: Pt agreed to PT session, did report fatigue, but no hallucinations during session, pt suzanne bed mob w/assist of 2, STS w/mOD 2, 3 side-steps , seated rest then another 2 side-steps , more cues required for knee ext this date, pt also incr forw flex to unsafe level requiring the encouraged rest EOB before continuing session, pt suzanne LE exer in supine x15 reps , will need SNU as pt was living at home w/son assisting, and is below her PLOF currently      Anticipated Discharge Disposition (PT): skilled nursing facility                         None

## 2024-04-26 NOTE — PROGRESS NOTES
Discharge Planning Assessment  [facility]     Patient Name: Halie Guidry  MRN: [id number]  Today's Date: 4/26/2024    Admit Date: 4/20/2024    Plan: Skilled rehab at [facility] has accepted, checking on Octretide   Discharge Needs Assessment    No documentation.                  Discharge Plan       Row Name 04/26/24 1647       Plan    Plan Skilled rehab at [facility] has accepted, checking on Octretide    Plan Comments Spoke with patient at bedside she is agreeable with [facility] for skilled rehab, Spoke with Dayron/Drea he is checking with the facility/[facility] can take Octretide. Will need insurance ronnie Castellanos RN                  Continued Care and Services - Admitted Since 4/20/2024       Destination       Service Provider Request Status Selected Services Address Phone Fax Patient Preferred    [facility] Accepted N/A [address] [id number] [phone] --    [facility] Accepted N/A [address] [id number] [phone] --    [facility] Pending - [facility] [address] [facility] [address] [phone] --    [facility] Pending - Request Sent N/A [address] [facility] [id number] [phone] --    [facility] Declined  Cannot meet patient's needs N/A [address] [facility] [id number] [phone] --    [facility] Declined  Cannot meet patient's needs N/A [address] [facility] [address] [phone] --              Home Medical Care       Service Provider Request Status Selected Services Address Phone Fax Patient Preferred    [facility] Accepted N/A [address], [address] [id number] [phone] --    [facility]  Acuity too  high N/A [address], [address], [facility] [id number] [phone] --    [facility] Declined  Needs psych nursing which we do not provide/safety at home N/A [address], [facility] [id number] [phone] --    [facility] Declined  [facility] cannot accept at this time N/A [address], [facility] [address] [phone] --    [facility] Declined  Insurance Denial N/A [address] [address] [phone] --                  Selected Continued Care - Prior Encounters Includes continued care and service providers with selected services from prior encounters from 1/21/2024 to 4/26/2024      Discharged on 2/10/2024 Admission date: 1/27/2024 - Discharge disposition: Skilled Nursing Facility (DC - External)      Destination       Service Provider Selected Services Address Phone Fax Patient Preferred    [facility] [address] , [place] [address] [phone] --                          Expected Discharge Date and Time       Expected Discharge Date Expected Discharge Time    Apr 26, 2024            Demographic Summary    No documentation.                  Functional Status    No documentation.                  Psychosocial    No documentation.                  Abuse/Neglect    No documentation.                  Legal    No documentation.                  Substance Abuse    No documentation.                  Patient Forms    No documentation.                     Heidi Epstein, RN

## 2024-04-27 LAB
ALBUMIN SERPL-MCNC: 3.3 G/DL (ref 3.5–5.2)
ANION GAP SERPL CALCULATED.3IONS-SCNC: 15.8 MMOL/L (ref 5–15)
BASOPHILS # BLD AUTO: 0.1 10*3/MM3 (ref 0–0.2)
BASOPHILS NFR BLD AUTO: 1.3 % (ref 0–1.5)
BUN SERPL-MCNC: 67 MG/DL (ref 8–23)
BUN/CREAT SERPL: 27.1 (ref 7–25)
CALCIUM SPEC-SCNC: 8.8 MG/DL (ref 8.6–10.5)
CHLORIDE SERPL-SCNC: 96 MMOL/L (ref 98–107)
CO2 SERPL-SCNC: 24.2 MMOL/L (ref 22–29)
CREAT SERPL-MCNC: 2.47 MG/DL (ref 0.57–1)
DEPRECATED RDW RBC AUTO: 45.6 FL (ref 37–54)
EGFRCR SERPLBLD CKD-EPI 2021: 18.9 ML/MIN/1.73
EOSINOPHIL # BLD AUTO: 0.34 10*3/MM3 (ref 0–0.4)
EOSINOPHIL NFR BLD AUTO: 4.4 % (ref 0.3–6.2)
ERYTHROCYTE [DISTWIDTH] IN BLOOD BY AUTOMATED COUNT: 14.3 % (ref 12.3–15.4)
GLUCOSE BLDC GLUCOMTR-MCNC: 106 MG/DL (ref 70–130)
GLUCOSE BLDC GLUCOMTR-MCNC: 143 MG/DL (ref 70–130)
GLUCOSE BLDC GLUCOMTR-MCNC: 145 MG/DL (ref 70–130)
GLUCOSE BLDC GLUCOMTR-MCNC: 324 MG/DL (ref 70–130)
GLUCOSE SERPL-MCNC: 142 MG/DL (ref 65–99)
HCT VFR BLD AUTO: 36 % (ref 34–46.6)
HGB BLD-MCNC: 11.5 G/DL (ref 12–15.9)
IMM GRANULOCYTES # BLD AUTO: 0.05 10*3/MM3 (ref 0–0.05)
IMM GRANULOCYTES NFR BLD AUTO: 0.7 % (ref 0–0.5)
LYMPHOCYTES # BLD AUTO: 1.77 10*3/MM3 (ref 0.7–3.1)
LYMPHOCYTES NFR BLD AUTO: 23.1 % (ref 19.6–45.3)
MAGNESIUM SERPL-MCNC: 2 MG/DL (ref 1.6–2.4)
MCH RBC QN AUTO: 27.8 PG (ref 26.6–33)
MCHC RBC AUTO-ENTMCNC: 31.9 G/DL (ref 31.5–35.7)
MCV RBC AUTO: 87 FL (ref 79–97)
MONOCYTES # BLD AUTO: 0.74 10*3/MM3 (ref 0.1–0.9)
MONOCYTES NFR BLD AUTO: 9.7 % (ref 5–12)
NEUTROPHILS NFR BLD AUTO: 4.66 10*3/MM3 (ref 1.7–7)
NEUTROPHILS NFR BLD AUTO: 60.8 % (ref 42.7–76)
NRBC BLD AUTO-RTO: 0 /100 WBC (ref 0–0.2)
PHOSPHATE SERPL-MCNC: 6.6 MG/DL (ref 2.5–4.5)
PLATELET # BLD AUTO: 294 10*3/MM3 (ref 140–450)
PMV BLD AUTO: 10.1 FL (ref 6–12)
POTASSIUM SERPL-SCNC: 3.7 MMOL/L (ref 3.5–5.2)
RBC # BLD AUTO: 4.14 10*6/MM3 (ref 3.77–5.28)
SODIUM SERPL-SCNC: 136 MMOL/L (ref 136–145)
URATE SERPL-MCNC: 9.8 MG/DL (ref 2.4–5.7)
WBC NRBC COR # BLD AUTO: 7.66 10*3/MM3 (ref 3.4–10.8)

## 2024-04-27 PROCEDURE — 63710000001 INSULIN GLARGINE PER 5 UNITS: Performed by: HOSPITALIST

## 2024-04-27 PROCEDURE — 82948 REAGENT STRIP/BLOOD GLUCOSE: CPT

## 2024-04-27 PROCEDURE — 25010000002 ENOXAPARIN PER 10 MG: Performed by: HOSPITALIST

## 2024-04-27 PROCEDURE — 85025 COMPLETE CBC W/AUTO DIFF WBC: CPT | Performed by: STUDENT IN AN ORGANIZED HEALTH CARE EDUCATION/TRAINING PROGRAM

## 2024-04-27 PROCEDURE — 99232 SBSQ HOSP IP/OBS MODERATE 35: CPT | Performed by: INTERNAL MEDICINE

## 2024-04-27 PROCEDURE — 80069 RENAL FUNCTION PANEL: CPT | Performed by: INTERNAL MEDICINE

## 2024-04-27 PROCEDURE — 83735 ASSAY OF MAGNESIUM: CPT | Performed by: INTERNAL MEDICINE

## 2024-04-27 PROCEDURE — 84550 ASSAY OF BLOOD/URIC ACID: CPT | Performed by: INTERNAL MEDICINE

## 2024-04-27 PROCEDURE — 63710000001 INSULIN LISPRO (HUMAN) PER 5 UNITS: Performed by: STUDENT IN AN ORGANIZED HEALTH CARE EDUCATION/TRAINING PROGRAM

## 2024-04-27 PROCEDURE — 63710000001 INSULIN LISPRO (HUMAN) PER 5 UNITS: Performed by: HOSPITALIST

## 2024-04-27 RX ORDER — AMLODIPINE BESYLATE 5 MG/1
5 TABLET ORAL DAILY
Status: DISCONTINUED | OUTPATIENT
Start: 2024-04-27 | End: 2024-05-01 | Stop reason: HOSPADM

## 2024-04-27 RX ADMIN — HYDROCODONE BITARTRATE AND ACETAMINOPHEN 1 TABLET: 10; 325 TABLET ORAL at 08:17

## 2024-04-27 RX ADMIN — HYDROCODONE BITARTRATE AND ACETAMINOPHEN 1 TABLET: 10; 325 TABLET ORAL at 16:27

## 2024-04-27 RX ADMIN — ZINC OXIDE 1 APPLICATION: 200 OINTMENT TOPICAL at 08:18

## 2024-04-27 RX ADMIN — LIDOCAINE 1 APPLICATION: 4 CREAM TOPICAL at 08:19

## 2024-04-27 RX ADMIN — PREGABALIN 75 MG: 75 CAPSULE ORAL at 20:36

## 2024-04-27 RX ADMIN — INSULIN LISPRO 5 UNITS: 100 INJECTION, SOLUTION INTRAVENOUS; SUBCUTANEOUS at 12:04

## 2024-04-27 RX ADMIN — AMLODIPINE BESYLATE 5 MG: 5 TABLET ORAL at 08:17

## 2024-04-27 RX ADMIN — CASTOR OIL AND BALSAM, PERU 1 APPLICATION: 788; 87 OINTMENT TOPICAL at 08:19

## 2024-04-27 RX ADMIN — ENOXAPARIN SODIUM 30 MG: 100 INJECTION SUBCUTANEOUS at 20:36

## 2024-04-27 RX ADMIN — INSULIN GLARGINE 35 UNITS: 100 INJECTION, SOLUTION SUBCUTANEOUS at 20:35

## 2024-04-27 RX ADMIN — ZINC OXIDE 1 APPLICATION: 200 OINTMENT TOPICAL at 20:41

## 2024-04-27 RX ADMIN — LIDOCAINE 1 APPLICATION: 4 CREAM TOPICAL at 20:41

## 2024-04-27 RX ADMIN — ATORVASTATIN CALCIUM 80 MG: 80 TABLET, FILM COATED ORAL at 20:36

## 2024-04-27 RX ADMIN — Medication 1000 MCG: at 08:17

## 2024-04-27 RX ADMIN — Medication 10 ML: at 20:36

## 2024-04-27 RX ADMIN — BISOPROLOL FUMARATE 5 MG: 5 TABLET, FILM COATED ORAL at 08:17

## 2024-04-27 RX ADMIN — DULOXETINE HYDROCHLORIDE 30 MG: 30 CAPSULE, DELAYED RELEASE ORAL at 08:17

## 2024-04-27 RX ADMIN — INSULIN LISPRO 12 UNITS: 100 INJECTION, SOLUTION INTRAVENOUS; SUBCUTANEOUS at 08:18

## 2024-04-27 RX ADMIN — HYDROCODONE BITARTRATE AND ACETAMINOPHEN 1 TABLET: 10; 325 TABLET ORAL at 20:36

## 2024-04-27 RX ADMIN — INSULIN LISPRO 12 UNITS: 100 INJECTION, SOLUTION INTRAVENOUS; SUBCUTANEOUS at 12:05

## 2024-04-27 RX ADMIN — Medication 10 ML: at 08:19

## 2024-04-27 RX ADMIN — CASTOR OIL AND BALSAM, PERU 1 APPLICATION: 788; 87 OINTMENT TOPICAL at 20:41

## 2024-04-27 RX ADMIN — PREGABALIN 75 MG: 75 CAPSULE ORAL at 08:17

## 2024-04-27 RX ADMIN — LEVOTHYROXINE SODIUM 112 MCG: 112 TABLET ORAL at 06:08

## 2024-04-27 RX ADMIN — INSULIN LISPRO 12 UNITS: 100 INJECTION, SOLUTION INTRAVENOUS; SUBCUTANEOUS at 17:33

## 2024-04-27 RX ADMIN — PANTOPRAZOLE SODIUM 40 MG: 40 TABLET, DELAYED RELEASE ORAL at 06:08

## 2024-04-27 RX ADMIN — INSULIN GLARGINE 35 UNITS: 100 INJECTION, SOLUTION SUBCUTANEOUS at 08:17

## 2024-04-27 NOTE — PROGRESS NOTES
The patient is in bright spirits today and reports that the visual hallucinations of which she had previously complained have entirely resolved.  She offers no new complaints when seen today.

## 2024-04-27 NOTE — PLAN OF CARE
Goal Outcome Evaluation:              Outcome Evaluation: Pt A&Ox4. VSS. Pt denies hallucinations. Pt pain well controlled with scheduled pain med. Plan of care to continue.

## 2024-04-27 NOTE — PROGRESS NOTES
"DAILY PROGRESS NOTE  [facility]    Patient Identification:  Name: Halie Guidry  Age: 83 y.o.  Sex: female  :  1940  MRN: [id number]         Primary Care Physician: Napoleon Mead MD      Subjective  Patient presently with no acute complaints.  She notes that the visual hallucinations have resolved.  On questioning she states she realized when she was seeing them that they were not real.  Discussed her lack of insulin for 3 months.  She blames it on the pharmacy.  On questioning she never called her PCP.  It appears she is also missed 3 months of octreotide.  This has been attributed to multiple hospitalization.    Objective:  General Appearance:  Comfortable, well-appearing, in no acute distress and not in pain.    Vital signs: (most recent): Blood pressure 104/76, pulse 65, temperature 97.3 °F (36.3 °C), temperature source Oral, resp. rate 18, height 170.2 cm (67\"), weight 84.1 kg (185 lb 6.5 oz), SpO2 93%, not currently breastfeeding.    Lungs:  Normal effort and normal respiratory rate.  Breath sounds clear to auscultation.    Heart: Normal rate.  Regular rhythm.    Extremities: There is no dependent edema.    Neurological: Patient is alert.  (Oriented to person and place.  Cooperative and pleasant.).    Skin:  Warm and dry.  (Early chronic stasis dermatitis changes on the shins bilaterally.)                Vital signs in last 24 hours:  Temp:  [97.3 °F (36.3 °C)-98.4 °F (36.9 °C)] 97.3 °F (36.3 °C)  Heart Rate:  [65-68] 65  Resp:  [18] 18  BP: (104-137)/(49-76) 104/76    Intake/Output:    Intake/Output Summary (Last 24 hours) at 2024 1725  Last data filed at 2024 1332  Gross per 24 hour   Intake 750 ml   Output 1100 ml   Net -350 ml         Results from last 7 days   Lab Units 24  0554 24  0533 24  0736 24  0614 24  0547 24  0703 24  2050   WBC 10*3/mm3 7.66 9.82 8.84 8.40 8.40 8.76 9.75   HEMOGLOBIN g/dL 11.5* 11.6* 12.6 11.1* " 11.6* 11.6* 13.5   PLATELETS 10*3/mm3 294 283 305 289 269 252 299     Results from last 7 days   Lab Units 04/27/24  0554 04/26/24  0533 04/25/24  0736 04/24/24  0614 04/23/24  0547 04/22/24  0703 04/21/24  1209   SODIUM mmol/L 136 137  137 136 136 134* 133* 131*   POTASSIUM mmol/L 3.7 3.7  3.7 3.7 3.4* 3.7 3.7 3.3*   CHLORIDE mmol/L 96* 99  99 98 98 97* 98 94*   CO2 mmol/L 24.2 23.4  23.4 23.7 25.0 23.0 21.8* 24.0   BUN mg/dL 67* 61*  61* 53* 44* 37* 42* 41*   CREATININE mg/dL 2.47* 2.44*  2.44* 2.37* 2.04* 1.80* 1.82* 2.36*   GLUCOSE mg/dL 142* 177*  177* 178* 279* 284* 385* 261*   Estimated Creatinine Clearance: 19.2 mL/min (A) (by C-G formula based on SCr of 2.47 mg/dL (H)).  Results from last 7 days   Lab Units 04/27/24  0554 04/26/24  0533 04/25/24  0736 04/24/24  0614 04/23/24  0547 04/22/24  0703 04/21/24  1209   CALCIUM mg/dL 8.8 8.8  8.8 9.3 8.3* 8.6 9.1 9.1   ALBUMIN g/dL 3.3* 3.2* 3.6 3.4* 3.4* 3.3* 3.5   MAGNESIUM mg/dL 2.0 2.1 2.3 1.5* 1.9 1.5*  --    PHOSPHORUS mg/dL 6.6* 5.4* 5.4* 4.8* 3.7 3.3  --      Results from last 7 days   Lab Units 04/27/24  0554 04/26/24  0533 04/25/24  0736 04/24/24  0614 04/23/24  0547 04/22/24  0703 04/21/24  1209 04/20/24  2050   ALBUMIN g/dL 3.3* 3.2* 3.6 3.4* 3.4* 3.3* 3.5 4.4   BILIRUBIN mg/dL  --   --  0.4 0.3 0.3 0.4 0.4 0.6   ALK PHOS U/L  --   --  104 90 97 104 117 141*   AST (SGOT) U/L  --   --  14 10 10 11 9 7   ALT (SGPT) U/L  --   --  9 9 9 <5 8 10       Assessment:    Uncontrolled diabetes mellitus with hyperglycemia    Primary hypothyroidism    VIPUL (acute kidney injury)    Hypertensive kidney disease with stage 3a chronic kidney disease    HTN (hypertension)    Pseudohyponatremia    Grossly uncontrolled type 2 diabetes: Associated with hyperosmolar hyperglycemia with pseudohyponatremia on presentation.  Blood sugars back to moderately reasonable control with resolution of pseudohyponatremia.  Blood sugar in excess of 800 on presentation with A1c in  excess of 14.  Well-differentiated pancreatic neuroendocrine tumor: Oncology input greatly appreciated.  Resume octreotide.  CKD 3B/VIPUL: Improved.  Nephrology input appreciated.  Diabetic polyneuropathy:  Hypertension: Continue present regime.  Visual hallucinations: Resolved.  Psychiatry input appreciated.    All problems new to this examiner.    Plan:  Please see above.  Discussed with patient.  Discussed with oncology.    Raulito Disla MD  4/27/2024  17:25 EDT

## 2024-04-27 NOTE — PROGRESS NOTES
REASON FOR FOLLOWUP/CHIEF COMPLAINT:  Pancreatic neuroendocrine tumor    HISTORY OF PRESENT ILLNESS:   No new problems overnight.  She asked me about having another scan.    Past Medical History, Past Surgical History, Social History, Family History have been reviewed and are without significant changes except as mentioned.    Review of Systems   Review of Systems   Constitutional:  Negative for activity change.   HENT:  Negative for nosebleeds and trouble swallowing.    Respiratory:  Negative for shortness of breath and wheezing.    Cardiovascular:  Negative for chest pain and palpitations.   Gastrointestinal:  Negative for diarrhea and nausea.   Genitourinary:  Negative for dysuria and hematuria.   Musculoskeletal:  Negative for arthralgias and myalgias.   Skin:  Negative for rash and wound.   Neurological:  Negative for seizures and syncope.   Hematological:  Negative for adenopathy. Does not bruise/bleed easily.   Psychiatric/Behavioral:  Negative for confusion.        Medications:  The current medication list was reviewed in the EMR    ALLERGIES:    Allergies   Allergen Reactions    Sulfa Antibiotics Unknown - High Severity     Pt says it was a long time ago and I don't remember but it wasn't good.               Vitals:    04/26/24 1551 04/26/24 1939 04/26/24 2356 04/27/24 0725   BP: 94/76 137/49 113/70 119/54   BP Location: Left arm Left arm Left arm Left arm   Patient Position: Lying Lying Lying Lying   Pulse: 64 68 65    Resp: 18 18 18 18   Temp: 97.5 °F (36.4 °C) 98.1 °F (36.7 °C) 98.4 °F (36.9 °C) 98.2 °F (36.8 °C)   TempSrc: Oral Oral Oral Oral   SpO2: 97% 97% 93%    Weight:       Height:         Physical Exam    CONSTITUTIONAL:  Vital signs reviewed.  No distress, looks comfortable.  EYES:  Conjunctivae and lids unremarkable.  PERRLA  EARS, NOSE, MOUTH, THROAT:  Ears and nose appear unremarkable.  Lips, teeth, gums appear unremarkable.  RESPIRATORY:  Normal respiratory effort.  Lungs clear to  auscultation bilaterally.  CARDIOVASCULAR:  Normal S1, S2.  No murmurs, rubs or gallops.  No significant lower extremity edema.  GASTROINTESTINAL: Abdomen appears unremarkable.  Nontender.  No hepatomegaly.  No splenomegaly.  NEURO: Cranial nerves 2-12 grossly intact.  No focal deficits.  Appears to have equal strength all 4 extremities.  MUSCULOSKELETAL:  Unremarkable digits/nails.  No cyanosis or clubbing.  SKIN:  Warm.  No rashes.  PSYCHIATRIC:  Normal judgment and insight.  Normal mood and affect.        RECENT LABS:  WBC   Date Value Ref Range Status   04/27/2024 7.66 3.40 - 10.80 10*3/mm3 Final   04/26/2024 9.82 3.40 - 10.80 10*3/mm3 Final   04/25/2024 8.84 3.40 - 10.80 10*3/mm3 Final     Hemoglobin   Date Value Ref Range Status   04/27/2024 11.5 (L) 12.0 - 15.9 g/dL Final   04/26/2024 11.6 (L) 12.0 - 15.9 g/dL Final   04/25/2024 12.6 12.0 - 15.9 g/dL Final     Platelets   Date Value Ref Range Status   04/27/2024 294 140 - 450 10*3/mm3 Final   04/26/2024 283 140 - 450 10*3/mm3 Final   04/25/2024 305 140 - 450 10*3/mm3 Final       ASSESSMENT/PLAN:  Halie Guidry [id number]     *Well-differentiated pancreatic neuroendocrine tumor   Incidentally noted to have a hyperenhancing pancreatic head mass 3.6 cm with an adjacent 2.8 cm exophytic component or lymph node suspicious for NET radiographically by CT 7/26/2023  Laboratory studies showed a glucagon of 143, gastrin 236, somatostatin 21, CEA 3.09, CA 19-9 44.3, chromogranin A 2296, proinsulin 6.3, insulin 7.3, pancreatic polypeptide 643.6.  EGD on 8/4/2023 showing small hiatal hernia, erythema with erosions in the gastric antrum and body suggestive of erosive gastritis.  An endoscopic ultrasound was performed showing a pancreatic mass 3.5 x 3.0 cm in the pancreatic head and a second mass more circumscribed adjacently possibly communicating.  Fine-needle aspiration of the mass was performed showing a well-differentiated pancreatic neuroendocrine tumor  9/13/2023  dotatate scan-3.9 cm pancreatic head mass SUV 41.8 adjacent peripancreatic lymph node 2.1 cm SUV 38.4  9/13/2023-initiated octreotide 20 mg IM monthly; dose increased to 30 mg monthly 11/13/2023 12/11/2023 reports her diarrhea is better controlled since the dose increase on octreotide last month.  Did not receive octreotide from 12/11/2023 - 3/25/2024 due to multiple hospitalizations.  3/25/2024-resume octreotide.  Having 1-2 episodes of diarrhea daily.    Monthly octreotide injections are for treatment of her cancer.  I checked with the oncology pharmacist.  Octreotide cannot be given as an inpatient.  She is to resume this upon discharge and continue it monthly.  Is reasonable to be off of it for 2 or 3 weeks but she does need to get started back on this with a plan to continue it monthly indefinitely (stop in the future if it clearly stops working)  Last scan that could see the cancer was PET September 2023.  Had a CT abdomen 1/28/2024 but that was without contrast with no pancreatic tumor seen (likely due to the lack of contrast).  Creatinine is 2.5 therefore not ordering CT with IV contrast.  Considered MRI but creatinine has been worse over the past week.  Discussed with Dr. Disla and he suggest we hold off on MRI.  I sent a message to nephrology who is following her asking them to let us know when they feel it would be safe to do an MRI with IV contrast     *Chronic diarrhea x1 year, hyperglycemia (but diabetic)     *Normocytic, normochromic anemia-hemoglobin 11.3 likely secondary to CKD 3   B12 572, folate 10.9, ferritin 32, iron sat 11%   Hemoglobin improved at 11.6.      *Comorbidities include venous insufficiency, hypertension, hyperlipidemia, CKD, hypothyroidism, advanced age-PS ECOG 2     *Weight gain/lower extremity edema after discontinuation of diuretics few months ago from hospitalization.  Improvement in weight gain and lower extremity edema since reinitiating Lasix 20 mg daily on 11/13/2023.        *Hospitalized December 2023 for diabetic foot ulcer     *2 separate hospitalizations January 2024 related to C. difficile colitis.  Patient experienced VIPUL related to dehydration, improved with IV fluids.    *Anemia  Hb 11.5 from 11.6, from 12.6, from 11.1, from 11.6, from 11.6    *Hallucinations: Sees above AF food when she turns her head to the left.  Dr. Hanson, psychiatrist, states he does not think she is experiencing any true psychotic symptoms and does not feel this symptom would be amenable to psychotropic medication  4/ 26/24: Not having these hallucinations today     *Social-patient is living in her condo.    Office note 3/25/2024: Son recently moved in since she is unable to care for herself after multiple hospital admissions.  He reports she is unable to perform any of her ADLs.  He is working full-time and having a difficult time managing her care on top of work.  She is having diarrhea 1-2 times daily and is incontinent.  Refuses to wear briefs.  They are both distressed about their current situation.  Request , Savita Bergeron, come and discuss options with them.  Will place referral for home health with PT/OT/nursing care.  She is also going to look into private care options for them.  4/24/24: Currently in the hospital.  I am told the plan is a long-term nursing facility.     Plan  Patient missed her appointment with Dr. Gutierrez on 4/22/2024 because she was hospitalized..  Follow-up will need to be arranged.  I asked our office to arrange an appointment with Dr. Gutierrez with an octreotide injection in 2 to 3 weeks.  As an outpatient, she needs to resume octreotide at 30 mg monthly IM.  This is the ongoing treatment for her cancer with no good alternatives  When okay with nephrology, do an MRI abdomen with contrast to get a new baseline scan regarding the pancreatic tumor as the last scan was September 2023.  This new scan cannot adequately assess if the octreotide is working  considering she had a 3-month break off octreotide due to hospitalizations.  Instead, this new scan will serve as a new baseline.    Chart reviewed and summarized including hospitalist note and nephrology note.  Case discussed with Dr. Disla.

## 2024-04-28 LAB
ALBUMIN SERPL-MCNC: 3 G/DL (ref 3.5–5.2)
ANION GAP SERPL CALCULATED.3IONS-SCNC: 15.9 MMOL/L (ref 5–15)
BASOPHILS # BLD AUTO: 0.09 10*3/MM3 (ref 0–0.2)
BASOPHILS NFR BLD AUTO: 1.1 % (ref 0–1.5)
BUN SERPL-MCNC: 67 MG/DL (ref 8–23)
BUN/CREAT SERPL: 29.9 (ref 7–25)
CALCIUM SPEC-SCNC: 8.4 MG/DL (ref 8.6–10.5)
CHLORIDE SERPL-SCNC: 98 MMOL/L (ref 98–107)
CO2 SERPL-SCNC: 21.1 MMOL/L (ref 22–29)
CREAT SERPL-MCNC: 2.24 MG/DL (ref 0.57–1)
DEPRECATED RDW RBC AUTO: 45.1 FL (ref 37–54)
EGFRCR SERPLBLD CKD-EPI 2021: 21.3 ML/MIN/1.73
EOSINOPHIL # BLD AUTO: 0.34 10*3/MM3 (ref 0–0.4)
EOSINOPHIL NFR BLD AUTO: 4.3 % (ref 0.3–6.2)
ERYTHROCYTE [DISTWIDTH] IN BLOOD BY AUTOMATED COUNT: 14.1 % (ref 12.3–15.4)
GLUCOSE BLDC GLUCOMTR-MCNC: 131 MG/DL (ref 70–130)
GLUCOSE BLDC GLUCOMTR-MCNC: 185 MG/DL (ref 70–130)
GLUCOSE BLDC GLUCOMTR-MCNC: 196 MG/DL (ref 70–130)
GLUCOSE BLDC GLUCOMTR-MCNC: 284 MG/DL (ref 70–130)
GLUCOSE BLDC GLUCOMTR-MCNC: 408 MG/DL (ref 70–130)
GLUCOSE BLDC GLUCOMTR-MCNC: 411 MG/DL (ref 70–130)
GLUCOSE BLDC GLUCOMTR-MCNC: 443 MG/DL (ref 70–130)
GLUCOSE SERPL-MCNC: 274 MG/DL (ref 65–99)
HCT VFR BLD AUTO: 33.5 % (ref 34–46.6)
HGB BLD-MCNC: 10.6 G/DL (ref 12–15.9)
IMM GRANULOCYTES # BLD AUTO: 0.05 10*3/MM3 (ref 0–0.05)
IMM GRANULOCYTES NFR BLD AUTO: 0.6 % (ref 0–0.5)
LYMPHOCYTES # BLD AUTO: 1.42 10*3/MM3 (ref 0.7–3.1)
LYMPHOCYTES NFR BLD AUTO: 17.8 % (ref 19.6–45.3)
MCH RBC QN AUTO: 28 PG (ref 26.6–33)
MCHC RBC AUTO-ENTMCNC: 31.6 G/DL (ref 31.5–35.7)
MCV RBC AUTO: 88.4 FL (ref 79–97)
MONOCYTES # BLD AUTO: 0.74 10*3/MM3 (ref 0.1–0.9)
MONOCYTES NFR BLD AUTO: 9.3 % (ref 5–12)
NEUTROPHILS NFR BLD AUTO: 5.35 10*3/MM3 (ref 1.7–7)
NEUTROPHILS NFR BLD AUTO: 66.9 % (ref 42.7–76)
NRBC BLD AUTO-RTO: 0 /100 WBC (ref 0–0.2)
PHOSPHATE SERPL-MCNC: 5.5 MG/DL (ref 2.5–4.5)
PLATELET # BLD AUTO: 274 10*3/MM3 (ref 140–450)
PMV BLD AUTO: 10.1 FL (ref 6–12)
POTASSIUM SERPL-SCNC: 4 MMOL/L (ref 3.5–5.2)
RBC # BLD AUTO: 3.79 10*6/MM3 (ref 3.77–5.28)
SODIUM SERPL-SCNC: 135 MMOL/L (ref 136–145)
WBC NRBC COR # BLD AUTO: 7.99 10*3/MM3 (ref 3.4–10.8)

## 2024-04-28 PROCEDURE — 63710000001 INSULIN LISPRO (HUMAN) PER 5 UNITS: Performed by: HOSPITALIST

## 2024-04-28 PROCEDURE — 63710000001 INSULIN LISPRO (HUMAN) PER 5 UNITS: Performed by: STUDENT IN AN ORGANIZED HEALTH CARE EDUCATION/TRAINING PROGRAM

## 2024-04-28 PROCEDURE — 80069 RENAL FUNCTION PANEL: CPT | Performed by: INTERNAL MEDICINE

## 2024-04-28 PROCEDURE — 82948 REAGENT STRIP/BLOOD GLUCOSE: CPT

## 2024-04-28 PROCEDURE — 85025 COMPLETE CBC W/AUTO DIFF WBC: CPT | Performed by: STUDENT IN AN ORGANIZED HEALTH CARE EDUCATION/TRAINING PROGRAM

## 2024-04-28 PROCEDURE — 63710000001 INSULIN GLARGINE PER 5 UNITS: Performed by: HOSPITALIST

## 2024-04-28 PROCEDURE — 25010000002 ENOXAPARIN PER 10 MG: Performed by: HOSPITALIST

## 2024-04-28 PROCEDURE — 99232 SBSQ HOSP IP/OBS MODERATE 35: CPT | Performed by: INTERNAL MEDICINE

## 2024-04-28 RX ADMIN — DULOXETINE HYDROCHLORIDE 30 MG: 30 CAPSULE, DELAYED RELEASE ORAL at 09:24

## 2024-04-28 RX ADMIN — INSULIN LISPRO 4 UNITS: 100 INJECTION, SOLUTION INTRAVENOUS; SUBCUTANEOUS at 09:23

## 2024-04-28 RX ADMIN — INSULIN GLARGINE 40 UNITS: 100 INJECTION, SOLUTION SUBCUTANEOUS at 20:20

## 2024-04-28 RX ADMIN — INSULIN GLARGINE 35 UNITS: 100 INJECTION, SOLUTION SUBCUTANEOUS at 09:23

## 2024-04-28 RX ADMIN — ATORVASTATIN CALCIUM 80 MG: 80 TABLET, FILM COATED ORAL at 20:20

## 2024-04-28 RX ADMIN — AMLODIPINE BESYLATE 5 MG: 5 TABLET ORAL at 09:24

## 2024-04-28 RX ADMIN — INSULIN LISPRO 12 UNITS: 100 INJECTION, SOLUTION INTRAVENOUS; SUBCUTANEOUS at 09:23

## 2024-04-28 RX ADMIN — ZINC OXIDE 1 APPLICATION: 200 OINTMENT TOPICAL at 09:25

## 2024-04-28 RX ADMIN — HYDROCODONE BITARTRATE AND ACETAMINOPHEN 1 TABLET: 10; 325 TABLET ORAL at 20:20

## 2024-04-28 RX ADMIN — ZINC OXIDE 1 APPLICATION: 200 OINTMENT TOPICAL at 20:20

## 2024-04-28 RX ADMIN — HYDROCODONE BITARTRATE AND ACETAMINOPHEN 1 TABLET: 10; 325 TABLET ORAL at 09:24

## 2024-04-28 RX ADMIN — BISOPROLOL FUMARATE 5 MG: 5 TABLET, FILM COATED ORAL at 09:24

## 2024-04-28 RX ADMIN — PREGABALIN 75 MG: 75 CAPSULE ORAL at 20:21

## 2024-04-28 RX ADMIN — LIDOCAINE 1 APPLICATION: 4 CREAM TOPICAL at 20:20

## 2024-04-28 RX ADMIN — INSULIN LISPRO 12 UNITS: 100 INJECTION, SOLUTION INTRAVENOUS; SUBCUTANEOUS at 11:55

## 2024-04-28 RX ADMIN — Medication 1000 MCG: at 09:24

## 2024-04-28 RX ADMIN — PREGABALIN 75 MG: 75 CAPSULE ORAL at 09:24

## 2024-04-28 RX ADMIN — INSULIN LISPRO 12 UNITS: 100 INJECTION, SOLUTION INTRAVENOUS; SUBCUTANEOUS at 18:11

## 2024-04-28 RX ADMIN — HYDROCODONE BITARTRATE AND ACETAMINOPHEN 1 TABLET: 10; 325 TABLET ORAL at 16:38

## 2024-04-28 RX ADMIN — INSULIN LISPRO 7 UNITS: 100 INJECTION, SOLUTION INTRAVENOUS; SUBCUTANEOUS at 11:55

## 2024-04-28 RX ADMIN — CASTOR OIL AND BALSAM, PERU 1 APPLICATION: 788; 87 OINTMENT TOPICAL at 20:20

## 2024-04-28 RX ADMIN — Medication 10 ML: at 20:21

## 2024-04-28 RX ADMIN — Medication 10 ML: at 09:25

## 2024-04-28 RX ADMIN — PANTOPRAZOLE SODIUM 40 MG: 40 TABLET, DELAYED RELEASE ORAL at 06:32

## 2024-04-28 RX ADMIN — CASTOR OIL AND BALSAM, PERU 1 APPLICATION: 788; 87 OINTMENT TOPICAL at 09:24

## 2024-04-28 RX ADMIN — INSULIN LISPRO 2 UNITS: 100 INJECTION, SOLUTION INTRAVENOUS; SUBCUTANEOUS at 18:11

## 2024-04-28 RX ADMIN — LIDOCAINE 1 APPLICATION: 4 CREAM TOPICAL at 09:25

## 2024-04-28 RX ADMIN — ENOXAPARIN SODIUM 30 MG: 100 INJECTION SUBCUTANEOUS at 20:20

## 2024-04-28 NOTE — PROGRESS NOTES
REASON FOR FOLLOWUP/CHIEF COMPLAINT:  Pancreatic neuroendocrine tumor    HISTORY OF PRESENT ILLNESS:   No new issues.  Still awaiting placement plans    Past Medical History, Past Surgical History, Social History, Family History have been reviewed and are without significant changes except as mentioned.    Review of Systems   Review of Systems   Constitutional:  Negative for activity change.   HENT:  Negative for nosebleeds and trouble swallowing.    Respiratory:  Negative for shortness of breath and wheezing.    Cardiovascular:  Negative for chest pain and palpitations.   Gastrointestinal:  Negative for diarrhea and nausea.   Genitourinary:  Negative for dysuria and hematuria.   Musculoskeletal:  Negative for arthralgias and myalgias.   Skin:  Negative for rash and wound.   Neurological:  Negative for seizures and syncope.   Hematological:  Negative for adenopathy. Does not bruise/bleed easily.   Psychiatric/Behavioral:  Negative for confusion.        Medications:  The current medication list was reviewed in the EMR    ALLERGIES:    Allergies   Allergen Reactions    Sulfa Antibiotics Unknown - High Severity     Pt says it was a long time ago and I don't remember but it wasn't good.               Vitals:    04/27/24 1304 04/27/24 1957 04/27/24 2322 04/28/24 0719   BP: 104/76 113/71 132/50 143/65   BP Location: Left arm Left arm Left arm Left arm   Patient Position: Lying Sitting Lying Lying   Pulse:  67 72    Resp: 18 18 18 18   Temp: 97.3 °F (36.3 °C) 98.6 °F (37 °C) 98.2 °F (36.8 °C) 97.9 °F (36.6 °C)   TempSrc: Oral Oral Oral Oral   SpO2:  94% 94%    Weight:       Height:         Physical Exam    CONSTITUTIONAL:  Vital signs reviewed.  No distress, looks comfortable.  EYES:  Conjunctivae and lids unremarkable.  PERRLA  EARS, NOSE, MOUTH, THROAT:  Ears and nose appear unremarkable.  Lips, teeth, gums appear unremarkable.  RESPIRATORY:  Normal respiratory effort.  Lungs clear to auscultation  bilaterally.  CARDIOVASCULAR:  Normal S1, S2.  No murmurs, rubs or gallops.  No significant lower extremity edema.  GASTROINTESTINAL: Abdomen appears unremarkable.  Nontender.  No hepatomegaly.  No splenomegaly.  NEURO: Cranial nerves 2-12 grossly intact.  No focal deficits.  Appears to have equal strength all 4 extremities.  MUSCULOSKELETAL:  Unremarkable digits/nails.  No cyanosis or clubbing.  SKIN:  Warm.  No rashes.  PSYCHIATRIC:  Normal judgment and insight.  Normal mood and affect.        RECENT LABS:  WBC   Date Value Ref Range Status   04/28/2024 7.99 3.40 - 10.80 10*3/mm3 Final   04/27/2024 7.66 3.40 - 10.80 10*3/mm3 Final   04/26/2024 9.82 3.40 - 10.80 10*3/mm3 Final     Hemoglobin   Date Value Ref Range Status   04/28/2024 10.6 (L) 12.0 - 15.9 g/dL Final   04/27/2024 11.5 (L) 12.0 - 15.9 g/dL Final   04/26/2024 11.6 (L) 12.0 - 15.9 g/dL Final     Platelets   Date Value Ref Range Status   04/28/2024 274 140 - 450 10*3/mm3 Final   04/27/2024 294 140 - 450 10*3/mm3 Final   04/26/2024 283 140 - 450 10*3/mm3 Final       ASSESSMENT/PLAN:  Halie Guidry [id number]     *Well-differentiated pancreatic neuroendocrine tumor   Incidentally noted to have a hyperenhancing pancreatic head mass 3.6 cm with an adjacent 2.8 cm exophytic component or lymph node suspicious for NET radiographically by CT 7/26/2023  Laboratory studies showed a glucagon of 143, gastrin 236, somatostatin 21, CEA 3.09, CA 19-9 44.3, chromogranin A 2296, proinsulin 6.3, insulin 7.3, pancreatic polypeptide 643.6.  EGD on 8/4/2023 showing small hiatal hernia, erythema with erosions in the gastric antrum and body suggestive of erosive gastritis.  An endoscopic ultrasound was performed showing a pancreatic mass 3.5 x 3.0 cm in the pancreatic head and a second mass more circumscribed adjacently possibly communicating.  Fine-needle aspiration of the mass was performed showing a well-differentiated pancreatic neuroendocrine tumor  9/13/2023 dotatate  scan-3.9 cm pancreatic head mass SUV 41.8 adjacent peripancreatic lymph node 2.1 cm SUV 38.4  9/13/2023-initiated octreotide 20 mg IM monthly; dose increased to 30 mg monthly 11/13/2023 12/11/2023 reports her diarrhea is better controlled since the dose increase on octreotide last month.  Did not receive octreotide from 12/11/2023 - 3/25/2024 due to multiple hospitalizations.  3/25/2024-resume octreotide.  Having 1-2 episodes of diarrhea daily.    Monthly octreotide injections are for treatment of her cancer.  I checked with the oncology pharmacist.  Octreotide cannot be given as an inpatient.  She is to resume this upon discharge and continue it monthly.  Is reasonable to be off of it for 2 or 3 weeks but she does need to get started back on this with a plan to continue it monthly indefinitely (stop in the future if it clearly stops working)  Last scan that could see the cancer was PET September 2023.  Had a CT abdomen 1/28/2024 but that was without contrast with no pancreatic tumor seen (likely due to the lack of contrast).  Creatinine is 2.5 therefore not ordering CT with IV contrast.  Considered MRI but creatinine has been worse over the past week.  Discussed with Dr. Disla and he suggest we hold off on MRI.  I sent a message to nephrology who is following her asking them to let us know when they feel it would be safe to do an MRI with IV contrast  Cussed with Dr. Saba, nephrology.  He recommends holding off on MRI until/if creatinine improves     *Chronic diarrhea x1 year, hyperglycemia (but diabetic)     *Normocytic, normochromic anemia-hemoglobin 11.3 likely secondary to CKD 3   B12 572, folate 10.9, ferritin 32, iron sat 11%   Hemoglobin improved at 11.6.      *Comorbidities include venous insufficiency, hypertension, hyperlipidemia, CKD, hypothyroidism, advanced age-PS ECOG 2     *Weight gain/lower extremity edema after discontinuation of diuretics few months ago from hospitalization.  Improvement in  weight gain and lower extremity edema since reinitiating Lasix 20 mg daily on 11/13/2023.       *Hospitalized December 2023 for diabetic foot ulcer     *2 separate hospitalizations January 2024 related to C. difficile colitis.  Patient experienced VIPUL related to dehydration, improved with IV fluids.    *Anemia  Hb 10.6, from 11.5 from 11.6, from 12.6, from 11.1, from 11.6, from 11.6    *Chronic kidney disease, stage 3b  Creatinine 2.2, from 2.5    *Hallucinations: Sees above AF food when she turns her head to the left.  Dr. Hanson, psychiatrist, states he does not think she is experiencing any true psychotic symptoms and does not feel this symptom would be amenable to psychotropic medication  4/ 26/24: Not having these hallucinations today     *Social-patient is living in her condo.    Office note 3/25/2024: Son recently moved in since she is unable to care for herself after multiple hospital admissions.  He reports she is unable to perform any of her ADLs.  He is working full-time and having a difficult time managing her care on top of work.  She is having diarrhea 1-2 times daily and is incontinent.  Refuses to wear briefs.  They are both distressed about their current situation.  Request , Savita Bergeron, come and discuss options with them.  Will place referral for home health with PT/OT/nursing care.  She is also going to look into private care options for them.  4/24/24: Currently in the hospital.  I am told the plan is a long-term nursing facility.     Plan  Patient missed her appointment with Dr. Gutierrez on 4/22/2024 because she was hospitalized..  Follow-up will need to be arranged.  I asked our office to arrange an appointment with Dr. Gutierrez with an octreotide injection in 2 to 3 weeks.  As an outpatient, she needs to resume octreotide at 30 mg monthly IM.  This is the ongoing treatment for her cancer with no good alternatives  When okay with nephrology, do an MRI abdomen with contrast to  get a new baseline scan regarding the pancreatic tumor as the last scan was September 2023.  This new scan cannot adequately assess if the octreotide is working considering she had a 3-month break off octreotide due to hospitalizations.  Instead, this new scan will serve as a new baseline.  Note and nephrology has requested we hold off on an MRI due to her creatinine.  Defer to nephrology when/if it would be safe to do an MRI with contrast of the abdomen (pancreatic tumor not previously visualized with CT abdomen without contrast.  Avoiding CT abdomen with contrast due to elevated creatinine.    Chart reviewed and summarized including nephrology note and hospitalist note continuing the plan    I told her our service will follow peripherally, probably not seeing her daily but instead following her chart for issues such as nursing facility placement regarding continuing outpatient octreotide and when/if nephrology feels MRI abdomen with contrast is reasonable

## 2024-04-28 NOTE — PROGRESS NOTES
"DAILY PROGRESS NOTE  [facility]    Patient Identification:  Name: Halie Guidry  Age: 83 y.o.  Sex: female  :  1940  MRN: [id number]         Primary Care Physician: Napoleon Mead MD      Subjective  No new or acute complaints    Objective:  General Appearance:  Comfortable, well-appearing, in no acute distress and not in pain.    Vital signs: (most recent): Blood pressure 142/54, pulse 72, temperature 98.2 °F (36.8 °C), temperature source Oral, resp. rate 18, height 170.2 cm (67\"), weight 84.1 kg (185 lb 6.5 oz), SpO2 94%, not currently breastfeeding.    Lungs:  Normal effort and normal respiratory rate.  Breath sounds clear to auscultation.    Heart: Normal rate.  Regular rhythm.    Extremities: There is dependent edema.  (Trace edema ankles.  Chronic stasis dermatitis changes above the ankles.)  Neurological: Patient is alert and oriented to person, place and time.    Skin:  Warm and dry.                  Vital signs in last 24 hours:  Temp:  [97.9 °F (36.6 °C)-98.6 °F (37 °C)] 98.2 °F (36.8 °C)  Heart Rate:  [67-72] 72  Resp:  [18] 18  BP: (113-143)/(50-71) 142/54    Intake/Output:    Intake/Output Summary (Last 24 hours) at 2024 1345  Last data filed at 2024 1330  Gross per 24 hour   Intake 838 ml   Output 2400 ml   Net -1562 ml         Results from last 7 days   Lab Units 24  0622 24  0554 24  0533 24  0736 24  0614 24  0547 24  0703   WBC 10*3/mm3 7.99 7.66 9.82 8.84 8.40 8.40 8.76   HEMOGLOBIN g/dL 10.6* 11.5* 11.6* 12.6 11.1* 11.6* 11.6*   PLATELETS 10*3/mm3 274 294 283 305 289 269 252     Results from last 7 days   Lab Units 24  0622 24  0554 24  0533 24  0736 24  0614 24  0547 24  0703   SODIUM mmol/L 135* 136 137  137 136 136 134* 133*   POTASSIUM mmol/L 4.0 3.7 3.7  3.7 3.7 3.4* 3.7 3.7   CHLORIDE mmol/L 98 96* 99  99 98 98 97* 98   CO2 mmol/L 21.1* 24.2 23.4  23.4 23.7 25.0 " 23.0 21.8*   BUN mg/dL 67* 67* 61*  61* 53* 44* 37* 42*   CREATININE mg/dL 2.24* 2.47* 2.44*  2.44* 2.37* 2.04* 1.80* 1.82*   GLUCOSE mg/dL 274* 142* 177*  177* 178* 279* 284* 385*   Estimated Creatinine Clearance: 21.2 mL/min (A) (by C-G formula based on SCr of 2.24 mg/dL (H)).  Results from last 7 days   Lab Units 04/28/24  0622 04/27/24  0554 04/26/24  0533 04/25/24  0736 04/24/24  0614 04/23/24  0547 04/22/24  0703   CALCIUM mg/dL 8.4* 8.8 8.8  8.8 9.3 8.3* 8.6 9.1   ALBUMIN g/dL 3.0* 3.3* 3.2* 3.6 3.4* 3.4* 3.3*   MAGNESIUM mg/dL  --  2.0 2.1 2.3 1.5* 1.9 1.5*   PHOSPHORUS mg/dL 5.5* 6.6* 5.4* 5.4* 4.8* 3.7 3.3     Results from last 7 days   Lab Units 04/28/24 0622 04/27/24  0554 04/26/24  0533 04/25/24  0736 04/24/24  0614 04/23/24  0547 04/22/24  0703   ALBUMIN g/dL 3.0* 3.3* 3.2* 3.6 3.4* 3.4* 3.3*   BILIRUBIN mg/dL  --   --   --  0.4 0.3 0.3 0.4   ALK PHOS U/L  --   --   --  104 90 97 104   AST (SGOT) U/L  --   --   --  14 10 10 11   ALT (SGPT) U/L  --   --   --  9 9 9 <5       Assessment:    Uncontrolled diabetes mellitus with hyperglycemia    Primary hypothyroidism    VIPUL (acute kidney injury)    Hypertensive kidney disease with stage 3a chronic kidney disease    HTN (hypertension)    Pseudohyponatremia    Grossly uncontrolled type 2 diabetes: Associated with hyperosmolar hyperglycemia with pseudohyponatremia on presentation.  Blood sugars initially improved.  They took another jump this morning.  Titrate up Lantus.  Blood sugar in excess of 800 on presentation with A1c in excess of 14.  Well-differentiated pancreatic neuroendocrine tumor: Oncology input greatly appreciated.  She has also missed dosages of octreotide.  Attempting to resume this.  CKD 3B/VIPUL: Improving.  Nephrology input appreciated.  Diabetic polyneuropathy:  Hypertension: Norvasc dose being decreased per nephrology.  Monitor..  Visual hallucinations: Resolved.  Psychiatry input appreciated.    Plan:  Please see above.  Discussed  with patient.    Raulito Disla MD  4/28/2024  13:45 EDT

## 2024-04-28 NOTE — PROGRESS NOTES
[facility] Progress Note      Patient Name: Halie Guidry  : 1940  MRN: [id number]  Primary Care Physician:  Napoleon Mead MD  Date of admission: 2024    Subjective     Interval History:   Follow-up acute kidney injury on chronic kidney disease    Patient resting comfortably.  Edema decreasing.  No chest pain, shortness of breath, nausea or vomiting    Review of Systems:   As noted above    Objective     Vitals:   Temp:  [97.3 °F (36.3 °C)-98.6 °F (37 °C)] 97.9 °F (36.6 °C)  Heart Rate:  [67-72] 72  Resp:  [18] 18  BP: (104-143)/(50-76) 143/65  Flow (L/min):  [2] 2    Intake/Output Summary (Last 24 hours) at 2024 0923  Last data filed at 2024 0648  Gross per 24 hour   Intake 480 ml   Output 2400 ml   Net -1920 ml       Physical Exam:    General Appearance: alert, awake, chronically ill, no acute distress   Skin: warm and dry  HEENT: oral mucosa normal, nonicteric sclera  Neck: supple, no JVD  Lungs: CTA, unlabored breathing effort  Heart: RRR, normal S1 and S2  Abdomen: soft, nontender, nondistended  : no palpable bladder  Extremities: Trace edema with erythema and venous stasis changes  Neuro: normal speech and mental status     Scheduled Meds:     amLODIPine, 5 mg, Oral, Daily  ammonium lactate, 1 Application, Topical, BID  atorvastatin, 80 mg, Oral, Nightly  bisoprolol, 5 mg, Oral, Daily  castor oil-balsam peru, 1 Application, Topical, Q12H  DULoxetine, 30 mg, Oral, Daily  enoxaparin, 30 mg, Subcutaneous, Nightly  HYDROcodone-acetaminophen, 1 tablet, Oral, TID  hydrocortisone-bacitracin-zinc oxide-nystatin, 1 Application, Topical, BID  insulin glargine, 35 Units, Subcutaneous, Q12H  insulin lispro, 12 Units, Subcutaneous, TID With Meals  insulin lispro, 2-7 Units, Subcutaneous, 4x Daily AC & at Bedtime  levothyroxine, 112 mcg, Oral, Once per day on   levothyroxine, 168 mcg, Oral, Every  Sunday  pantoprazole, 40 mg, Oral, Q AM  pregabalin, 75 mg, Oral, BID  sodium chloride, 10 mL, Intravenous, Q12H  [Held by provider] torsemide, 20 mg, Oral, Daily  cyanocobalamin, 1,000 mcg, Oral, Daily      IV Meds:            Results Reviewed:   I have personally reviewed the results from the time of this admission to 4/28/2024 09:23 EDT     Results from last 7 days   Lab Units 04/28/24 0622 04/27/24 0554 04/26/24  0533 04/25/24  0736 04/24/24  0614 04/23/24  0547   SODIUM mmol/L 135* 136 137  137 136 136 134*   POTASSIUM mmol/L 4.0 3.7 3.7  3.7 3.7 3.4* 3.7   CHLORIDE mmol/L 98 96* 99  99 98 98 97*   CO2 mmol/L 21.1* 24.2 23.4  23.4 23.7 25.0 23.0   BUN mg/dL 67* 67* 61*  61* 53* 44* 37*   CREATININE mg/dL 2.24* 2.47* 2.44*  2.44* 2.37* 2.04* 1.80*   CALCIUM mg/dL 8.4* 8.8 8.8  8.8 9.3 8.3* 8.6   BILIRUBIN mg/dL  --   --   --  0.4 0.3 0.3   ALK PHOS U/L  --   --   --  104 90 97   ALT (SGPT) U/L  --   --   --  9 9 9   AST (SGOT) U/L  --   --   --  14 10 10   GLUCOSE mg/dL 274* 142* 177*  177* 178* 279* 284*       Estimated Creatinine Clearance: 21.2 mL/min (A) (by C-G formula based on SCr of 2.24 mg/dL (H)).    Results from last 7 days   Lab Units 04/28/24 0622 04/27/24  0554 04/26/24  0533 04/25/24  0736   MAGNESIUM mg/dL  --  2.0 2.1 2.3   PHOSPHORUS mg/dL 5.5* 6.6* 5.4* 5.4*       Results from last 7 days   Lab Units 04/27/24  0554 04/26/24  0533 04/25/24  0736 04/24/24  0614 04/23/24  0547 04/22/24  0703   URIC ACID mg/dL 9.8* 9.4* 9.0* 9.5* 8.9* 8.4*       Results from last 7 days   Lab Units 04/28/24  0622 04/27/24  0554 04/26/24  0533 04/25/24  0736 04/24/24  0614   WBC 10*3/mm3 7.99 7.66 9.82 8.84 8.40   HEMOGLOBIN g/dL 10.6* 11.5* 11.6* 12.6 11.1*   PLATELETS 10*3/mm3 274 294 283 305 289             Assessment / Plan     ASSESSMENT:  Acute kidney injury associated with volume depletion and inability to autoregulate since the patient was taking ACE inhibitor renal function.  Creatinine little  better at 2.24 Mg/DL.  Electrolytes okay.  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  Blood pressure better now.  Amlodipine dose was decreased due to relative hypotension  Diabetes mellitus type 2 with renal complication  Diabetic neuropathy, very painful Lyrica was adjusted by primary team  Hypothyroidism      PLAN:  Keep torsemide on hold for now.  Reevaluate in morning  Amlodipine dose was decreased due to relative hypotension  repeat labs in a.m.    I reviewed the chart and other providers notes, reviewed labs.    Copied text in this note has been reviewed and is accurate as of 04/28/24.       Thank you for involving us in the care of Halie Guidry.  Please feel free to call with any questions.    Ishaan Saba MD  04/28/24  09:23 EDT    [facility]  [phone]    Please note that portions of this note were completed with a voice recognition program.

## 2024-04-29 LAB
ALBUMIN SERPL-MCNC: 3.4 G/DL (ref 3.5–5.2)
ANION GAP SERPL CALCULATED.3IONS-SCNC: 14.9 MMOL/L (ref 5–15)
BASOPHILS # BLD AUTO: 0.1 10*3/MM3 (ref 0–0.2)
BASOPHILS NFR BLD AUTO: 1.1 % (ref 0–1.5)
BUN SERPL-MCNC: 60 MG/DL (ref 8–23)
BUN/CREAT SERPL: 30.8 (ref 7–25)
CALCIUM SPEC-SCNC: 8.9 MG/DL (ref 8.6–10.5)
CHLORIDE SERPL-SCNC: 99 MMOL/L (ref 98–107)
CO2 SERPL-SCNC: 22.1 MMOL/L (ref 22–29)
CREAT SERPL-MCNC: 1.95 MG/DL (ref 0.57–1)
DEPRECATED RDW RBC AUTO: 43.3 FL (ref 37–54)
EGFRCR SERPLBLD CKD-EPI 2021: 25.1 ML/MIN/1.73
EOSINOPHIL # BLD AUTO: 0.41 10*3/MM3 (ref 0–0.4)
EOSINOPHIL NFR BLD AUTO: 4.6 % (ref 0.3–6.2)
ERYTHROCYTE [DISTWIDTH] IN BLOOD BY AUTOMATED COUNT: 13.8 % (ref 12.3–15.4)
GLUCOSE BLDC GLUCOMTR-MCNC: 142 MG/DL (ref 70–130)
GLUCOSE BLDC GLUCOMTR-MCNC: 196 MG/DL (ref 70–130)
GLUCOSE BLDC GLUCOMTR-MCNC: 69 MG/DL (ref 70–130)
GLUCOSE BLDC GLUCOMTR-MCNC: 96 MG/DL (ref 70–130)
GLUCOSE SERPL-MCNC: 132 MG/DL (ref 65–99)
HCT VFR BLD AUTO: 36 % (ref 34–46.6)
HGB BLD-MCNC: 11.6 G/DL (ref 12–15.9)
IMM GRANULOCYTES # BLD AUTO: 0.06 10*3/MM3 (ref 0–0.05)
IMM GRANULOCYTES NFR BLD AUTO: 0.7 % (ref 0–0.5)
LYMPHOCYTES # BLD AUTO: 1.55 10*3/MM3 (ref 0.7–3.1)
LYMPHOCYTES NFR BLD AUTO: 17.4 % (ref 19.6–45.3)
MCH RBC QN AUTO: 27.8 PG (ref 26.6–33)
MCHC RBC AUTO-ENTMCNC: 32.2 G/DL (ref 31.5–35.7)
MCV RBC AUTO: 86.3 FL (ref 79–97)
MONOCYTES # BLD AUTO: 0.8 10*3/MM3 (ref 0.1–0.9)
MONOCYTES NFR BLD AUTO: 9 % (ref 5–12)
NEUTROPHILS NFR BLD AUTO: 5.98 10*3/MM3 (ref 1.7–7)
NEUTROPHILS NFR BLD AUTO: 67.2 % (ref 42.7–76)
NRBC BLD AUTO-RTO: 0 /100 WBC (ref 0–0.2)
PHOSPHATE SERPL-MCNC: 4.8 MG/DL (ref 2.5–4.5)
PLATELET # BLD AUTO: 316 10*3/MM3 (ref 140–450)
PMV BLD AUTO: 9.8 FL (ref 6–12)
POTASSIUM SERPL-SCNC: 3.9 MMOL/L (ref 3.5–5.2)
RBC # BLD AUTO: 4.17 10*6/MM3 (ref 3.77–5.28)
SODIUM SERPL-SCNC: 136 MMOL/L (ref 136–145)
TSH SERPL DL<=0.05 MIU/L-ACNC: 3.91 UIU/ML (ref 0.27–4.2)
WBC NRBC COR # BLD AUTO: 8.9 10*3/MM3 (ref 3.4–10.8)

## 2024-04-29 PROCEDURE — 25010000002 ENOXAPARIN PER 10 MG: Performed by: HOSPITALIST

## 2024-04-29 PROCEDURE — 85025 COMPLETE CBC W/AUTO DIFF WBC: CPT | Performed by: STUDENT IN AN ORGANIZED HEALTH CARE EDUCATION/TRAINING PROGRAM

## 2024-04-29 PROCEDURE — 82948 REAGENT STRIP/BLOOD GLUCOSE: CPT

## 2024-04-29 PROCEDURE — 63710000001 INSULIN LISPRO (HUMAN) PER 5 UNITS: Performed by: HOSPITALIST

## 2024-04-29 PROCEDURE — 63710000001 INSULIN LISPRO (HUMAN) PER 5 UNITS: Performed by: STUDENT IN AN ORGANIZED HEALTH CARE EDUCATION/TRAINING PROGRAM

## 2024-04-29 PROCEDURE — 84443 ASSAY THYROID STIM HORMONE: CPT | Performed by: INTERNAL MEDICINE

## 2024-04-29 PROCEDURE — 97110 THERAPEUTIC EXERCISES: CPT

## 2024-04-29 PROCEDURE — 97530 THERAPEUTIC ACTIVITIES: CPT

## 2024-04-29 PROCEDURE — 80069 RENAL FUNCTION PANEL: CPT | Performed by: INTERNAL MEDICINE

## 2024-04-29 PROCEDURE — 63710000001 INSULIN GLARGINE PER 5 UNITS: Performed by: HOSPITALIST

## 2024-04-29 RX ORDER — TRAMADOL HYDROCHLORIDE 50 MG/1
50 TABLET ORAL ONCE
Status: COMPLETED | OUTPATIENT
Start: 2024-04-29 | End: 2024-04-29

## 2024-04-29 RX ORDER — TORSEMIDE 20 MG/1
40 TABLET ORAL DAILY
Status: DISCONTINUED | OUTPATIENT
Start: 2024-04-29 | End: 2024-05-01 | Stop reason: HOSPADM

## 2024-04-29 RX ADMIN — Medication 1000 MCG: at 09:46

## 2024-04-29 RX ADMIN — CASTOR OIL AND BALSAM, PERU 1 APPLICATION: 788; 87 OINTMENT TOPICAL at 09:46

## 2024-04-29 RX ADMIN — Medication 10 ML: at 21:11

## 2024-04-29 RX ADMIN — INSULIN LISPRO 12 UNITS: 100 INJECTION, SOLUTION INTRAVENOUS; SUBCUTANEOUS at 12:43

## 2024-04-29 RX ADMIN — INSULIN LISPRO 2 UNITS: 100 INJECTION, SOLUTION INTRAVENOUS; SUBCUTANEOUS at 12:44

## 2024-04-29 RX ADMIN — INSULIN LISPRO 12 UNITS: 100 INJECTION, SOLUTION INTRAVENOUS; SUBCUTANEOUS at 09:47

## 2024-04-29 RX ADMIN — INSULIN LISPRO 12 UNITS: 100 INJECTION, SOLUTION INTRAVENOUS; SUBCUTANEOUS at 17:33

## 2024-04-29 RX ADMIN — HYDROCODONE BITARTRATE AND ACETAMINOPHEN 1 TABLET: 10; 325 TABLET ORAL at 21:10

## 2024-04-29 RX ADMIN — ZINC OXIDE 1 APPLICATION: 200 OINTMENT TOPICAL at 21:11

## 2024-04-29 RX ADMIN — Medication 10 ML: at 09:48

## 2024-04-29 RX ADMIN — LIDOCAINE 1 APPLICATION: 4 CREAM TOPICAL at 21:11

## 2024-04-29 RX ADMIN — CASTOR OIL AND BALSAM, PERU 1 APPLICATION: 788; 87 OINTMENT TOPICAL at 21:11

## 2024-04-29 RX ADMIN — AMLODIPINE BESYLATE 5 MG: 5 TABLET ORAL at 09:46

## 2024-04-29 RX ADMIN — ZINC OXIDE 1 APPLICATION: 200 OINTMENT TOPICAL at 09:48

## 2024-04-29 RX ADMIN — PREGABALIN 75 MG: 75 CAPSULE ORAL at 09:46

## 2024-04-29 RX ADMIN — BISOPROLOL FUMARATE 5 MG: 5 TABLET, FILM COATED ORAL at 09:46

## 2024-04-29 RX ADMIN — ATORVASTATIN CALCIUM 80 MG: 80 TABLET, FILM COATED ORAL at 21:09

## 2024-04-29 RX ADMIN — HYDROCODONE BITARTRATE AND ACETAMINOPHEN 1 TABLET: 10; 325 TABLET ORAL at 09:46

## 2024-04-29 RX ADMIN — ENOXAPARIN SODIUM 30 MG: 100 INJECTION SUBCUTANEOUS at 21:10

## 2024-04-29 RX ADMIN — DULOXETINE HYDROCHLORIDE 30 MG: 30 CAPSULE, DELAYED RELEASE ORAL at 09:46

## 2024-04-29 RX ADMIN — PREGABALIN 75 MG: 75 CAPSULE ORAL at 21:10

## 2024-04-29 RX ADMIN — LIDOCAINE 1 APPLICATION: 4 CREAM TOPICAL at 09:47

## 2024-04-29 RX ADMIN — TRAMADOL HYDROCHLORIDE 50 MG: 50 TABLET ORAL at 06:36

## 2024-04-29 RX ADMIN — TORSEMIDE 40 MG: 20 TABLET ORAL at 14:28

## 2024-04-29 RX ADMIN — INSULIN GLARGINE 40 UNITS: 100 INJECTION, SOLUTION SUBCUTANEOUS at 09:47

## 2024-04-29 RX ADMIN — PANTOPRAZOLE SODIUM 40 MG: 40 TABLET, DELAYED RELEASE ORAL at 06:36

## 2024-04-29 RX ADMIN — LEVOTHYROXINE SODIUM 112 MCG: 112 TABLET ORAL at 06:36

## 2024-04-29 RX ADMIN — HYDROCODONE BITARTRATE AND ACETAMINOPHEN 1 TABLET: 10; 325 TABLET ORAL at 16:34

## 2024-04-29 NOTE — PROGRESS NOTES
The patient is pleasant and cooperative when seen today.  She continues to deny any recurrence of visual hallucinations.  From a psychiatric standpoint she is stable and should not require Zyprexa following discharge.  I would recommend continuation of duloxetine.

## 2024-04-29 NOTE — PLAN OF CARE
Goal Outcome Evaluation:  Plan of Care Reviewed With: patient        Progress: improving  Outcome Evaluation: Pt demos slow but steady progress with strength and endurance, as evidenced by small increase in activity tolerance today. Pt performed bed mobility with mod A, stood with mod A x2, and was able to take several steps and pivot to BSC with mod A x2 and rwx. Pt continues to fatigue very quickly with upright mobility and remains at increased risk of falls. Pt will continue to benefit from PT to address impairments and increase independence with mobility to reduce fall risk. Recommend DC to SNF.      Anticipated Discharge Disposition (PT): skilled nursing facility

## 2024-04-29 NOTE — PROGRESS NOTES
Name: Halie Guidry ADMIT: 2024   : 1940  PCP: Napoleon Mead MD    MRN: [id number] LOS: 8 days   AGE/SEX: 83 y.o. female  ROOM: [facility]     Subjective   Subjective   She continues with lower extremity pain.  No polydipsia or polyuria.  No hypoglycemia.  No fever or chills.  No more hallucinations or delusions.    Review of Systems  Cardiovascular/respiratory.  No chest pain/no shortness of breath/no cough/no palpitation  .  No dysuria or hematuria  GI.  No abdominal pain or nausea or vomiting.       Objective   Objective   Vital Signs  Temp:  [97.5 °F (36.4 °C)-98.2 °F (36.8 °C)] 97.5 °F (36.4 °C)  Heart Rate:  [61-65] 63  Resp:  [15-18] 18  BP: (149-168)/(54-60) 168/57  SpO2:  [96 %-98 %] 98 %  on  Flow (L/min):  [2] 2;   Device (Oxygen Therapy): room air    Intake/Output Summary (Last 24 hours) at 2024 1344  Last data filed at 2024 1749  Gross per 24 hour   Intake 240 ml   Output 600 ml   Net -360 ml     Body mass index is 29.04 kg/m².      24  1921 24  0034   Weight: 90.6 kg (199 lb 11.8 oz) 84.1 kg (185 lb 6.5 oz)     Physical Exam  General.  Elderly female.  Alert and oriented x 4.  In no apparent pain/distress/diaphoresis.  Normal mood and affect.  Eyes.  Pupils equal round and reactive.  Intact extraocular musculature.  No pallor or jaundice.  Oral cavity.  Moist mucous membranes  Neck.  Supple.  No JVD.  No lymphadenopathy or thyromegaly.  Cardiovascular.  Regular rate and rhythm and grade 2 systolic murmur.  Chest.  Clear to auscultation bilaterally with no added sounds.  Abdomen.  Soft lax.  No tenderness.  No organomegaly.  No guarding or rebound.  Extremities.  Trace bilateral lower extremity edema with erythema.  No clubbing or cyanosis.  CNS.  No acute focal neurological deficits.    Results Review:      Results from last 7 days   Lab Units 24  0651 24  0622 24  0554 24  0533 24  0736 24  0614 24  0547   SODIUM  "mmol/L 136 135* 136 137  137 136 136 134*   POTASSIUM mmol/L 3.9 4.0 3.7 3.7  3.7 3.7 3.4* 3.7   CHLORIDE mmol/L 99 98 96* 99  99 98 98 97*   CO2 mmol/L 22.1 21.1* 24.2 23.4  23.4 23.7 25.0 23.0   BUN mg/dL 60* 67* 67* 61*  61* 53* 44* 37*   CREATININE mg/dL 1.95* 2.24* 2.47* 2.44*  2.44* 2.37* 2.04* 1.80*   GLUCOSE mg/dL 132* 274* 142* 177*  177* 178* 279* 284*   CALCIUM mg/dL 8.9 8.4* 8.8 8.8  8.8 9.3 8.3* 8.6   AST (SGOT) U/L  --   --   --   --  14 10 10   ALT (SGPT) U/L  --   --   --   --  9 9 9     Estimated Creatinine Clearance: 24.4 mL/min (A) (by C-G formula based on SCr of 1.95 mg/dL (H)).      Results from last 7 days   Lab Units 04/29/24  1126 04/29/24  0604 04/28/24  2007 04/28/24  1600 04/28/24  1358 04/28/24  1147 04/28/24  1106 04/28/24  1105   GLUCOSE mg/dL 196* 142* 131* 185* 196* 411* 408* 443*                 Results from last 7 days   Lab Units 04/29/24  0651 04/28/24  0622 04/27/24  0554 04/26/24  0533 04/25/24  0736 04/24/24  0614 04/23/24  0547   MAGNESIUM mg/dL  --   --  2.0 2.1 2.3 1.5* 1.9   PHOSPHORUS mg/dL 4.8*   < > 6.6* 5.4* 5.4* 4.8* 3.7    < > = values in this interval not displayed.           Invalid input(s): \"LDLCALC\"  Results from last 7 days   Lab Units 04/29/24  0651 04/28/24  0622 04/27/24  0554 04/26/24  0533 04/25/24  0736 04/24/24  0614 04/23/24  0547   WBC 10*3/mm3 8.90 7.99 7.66 9.82 8.84 8.40 8.40   HEMOGLOBIN g/dL 11.6* 10.6* 11.5* 11.6* 12.6 11.1* 11.6*   HEMATOCRIT % 36.0 33.5* 36.0 35.9 39.9 34.4 36.0   PLATELETS 10*3/mm3 316 274 294 283 305 289 269   MCV fL 86.3 88.4 87.0 87.6 85.1 84.9 84.1   MCH pg 27.8 28.0 27.8 28.3 26.9 27.4 27.1   MCHC g/dL 32.2 31.6 31.9 32.3 31.6 32.3 32.2   RDW % 13.8 14.1 14.3 14.1 14.1 13.8 13.8   RDW-SD fl 43.3 45.1 45.6 45.5 43.2 43.1 41.8   MPV fL 9.8 10.1 10.1 9.9 9.9 10.2 10.5   NEUTROPHIL % % 67.2 66.9 60.8 64.0 70.2 67.7 69.1   LYMPHOCYTE % % 17.4* 17.8* 23.1 20.9 16.0* 18.2* 18.1*   MONOCYTES % % 9.0 9.3 9.7 9.6 7.9 8.7 7.5 "   EOSINOPHIL % % 4.6 4.3 4.4 3.9 4.1 3.3 3.7   BASOPHIL % % 1.1 1.1 1.3 1.1 1.2 1.5 1.2   IMM GRAN % % 0.7* 0.6* 0.7* 0.5 0.6* 0.6* 0.4   NEUTROS ABS 10*3/mm3 5.98 5.35 4.66 6.29 6.21 5.68 5.81   LYMPHS ABS 10*3/mm3 1.55 1.42 1.77 2.05 1.41 1.53 1.52   MONOS ABS 10*3/mm3 0.80 0.74 0.74 0.94* 0.70 0.73 0.63   EOS ABS 10*3/mm3 0.41* 0.34 0.34 0.38 0.36 0.28 0.31   BASOS ABS 10*3/mm3 0.10 0.09 0.10 0.11 0.11 0.13 0.10   IMMATURE GRANS (ABS) 10*3/mm3 0.06* 0.05 0.05 0.05 0.05 0.05 0.03   NRBC /100 WBC 0.0 0.0 0.0 0.0 0.0 0.0 0.0                                     Results from last 7 days   Lab Units 04/27/24  0554   URIC ACID mg/dL 9.8*       Imaging:  Imaging Results (Last 24 Hours)       ** No results found for the last 24 hours. **               I reviewed the patient's new clinical results / labs / tests / procedures      Assessment/Plan     Active Hospital Problems    Diagnosis  POA    **Uncontrolled diabetes mellitus with hyperglycemia [E11.65]  Yes    HTN (hypertension) [I10]  Unknown    Pseudohyponatremia [R79.89]  Unknown    Hypertensive kidney disease with stage 3a chronic kidney disease [I12.9, N18.31]  Yes    VIPUL (acute kidney injury) [N17.9]  Yes    Primary hypothyroidism [E03.9]  Yes      Resolved Hospital Problems   No resolved problems to display.           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  This has greatly improved on adjustment of the insulin.  A1c is 14.  Lyrica has been increased during this admission.  Acceptable Accu-Cheks at this time.  Continue Lantus and lispro AC and sliding scale.  Hypothyroidism.  Continue current replacement and check TSH.  Pseudohyponatremia secondary to hyperglycemia.  Resolved.  Acute on top of chronic stage IIIb chronic renal failure.  Acute kidney failure is secondary to volume depletion in a patient on ACE.  ACE has been DC'd.  Status post IV fluid that has been since DC'd.  Patient volume status is  normal now.  Nephrology is following and started torsemide today.  Improving renal function.  Baseline creatinine 1.3.  Chronic kidney failure secondary to diabetes and hypertension.  Pancreatic pseudo endocrine tumor.  Hematology oncology following.  The plan is for octreotide as an outpatient with MRI with and without contrast to follow-up on the tumor after kidney function improves.  Hypertension.  Acutely acceptable on Norvasc and Zebeta.  No evidence of angina or congestive heart failure.  Visual hallucination.  Resolved.  Continue Cymbalta  Chronic disease anemia.  Hemoglobin is stable.  VTE prophylaxis.  On Lovenox.      Discussed my findings and plan of treatment with the patient/nurses and multidisciplinary rounds.  Disposition.  Hopefully to skilled tomorrow if okay with nephrology and renal function continues to improve        Jazlyn Maynard MD  [facility]  04/29/24  13:44 EDT

## 2024-04-29 NOTE — CASE MANAGEMENT/SOCIAL WORK
Post-Acute Authorization Submission      Post Acute Pre-Cert Documentation  Request Submitted by Facility - Type:: Hospital  Post-Acute Authorization Type Submitted:: SNF  Date Post Acute Pre-Cert Inititated per Facility: 04/29/24  Accepting Facility: [facility] Discharge Date Requested: 04/30/24  All Clinicals Submitted?: Yes  Had Accepting Facility at Time of Submission: Yes  Response Communicated to::   Authorization Number:: PENDING  [id number]  Post Acute Pre-Cert Initiated Comment: Kamilah Bennett RN

## 2024-04-29 NOTE — THERAPY TREATMENT NOTE
Patient Name: Halie Guidry  : 1940    MRN: [id number]                              Today's Date: 2024       Admit Date: 2024    Visit Dx:     ICD-10-CM ICD-9-CM   1. Uncontrolled type 1 diabetes mellitus with hyperglycemia  E10.65 250.83     790.29   2. Acute renal failure, unspecified acute renal failure type  N17.9 584.9     Patient Active Problem List   Diagnosis    Compression fracture    Lumbar degenerative disc disease    Type 2 diabetes mellitus with hyperglycemia, with long-term current use of insulin    Hyperlipidemia    Benign essential hypertension    Primary hypothyroidism    Neuropathy    Osteoporosis    Proteinuria    Tobacco abuse    Diabetic eye exam    Generalized weakness    Spinal stenosis    Scoliosis    Arthritis    VBI (vertebrobasilar insufficiency)    Orthostatic hypotension    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    Severe hypothyroidism    Noncompliance with medication regimen    Vitamin D deficiency disease    Altered mental status    Tremor    Seizure    Stage 3a chronic kidney disease    Thoracic degenerative disc disease    Metabolic encephalopathy    VIPUL (acute kidney injury)    Hyperglycemia    Type II diabetes mellitus, uncontrolled    Lower abdominal pain    Transaminitis    UTI (urinary tract infection)    Emphysematous cystitis    Choledocholithiasis    Hypokalemia    Hypoxia    Generalized abdominal pain    History of Clostridium difficile infection    History of ERCP    Acute UTI (urinary tract infection)    Hypomagnesemia    Burning pain    Anxiety disorder    Neuropathic pain    Intertrigo    Weakness of both lower extremities    Accelerated hypertension    Acute cystitis without hematuria    Altered mental status, unspecified altered mental status type    Left lower lobe pneumonia    Acute pain of left foot    Cellulitis and abscess of left lower extremity    Hypertensive kidney disease with stage 3a chronic kidney disease     Bilateral leg pain    Peripheral edema    Primary malignant neuroendocrine tumor of pancreas    Encounter for long-term (current) use of high-risk medication    Diabetic foot ulcer    Acute renal insufficiency    C. difficile diarrhea    Sepsis    Stage 3a chronic kidney disease    Metabolic encephalopathy    Pressure injury of buttock, stage 2    Uncontrolled diabetes mellitus with hyperglycemia    HTN (hypertension)    Pseudohyponatremia     Past Medical History:   Diagnosis Date    Acute metabolic encephalopathy 6/24/2022    Anxiety     Arthritis     Benign essential hypertension 08/20/2014    Bleeding disorder     Depression     Diabetes     Diabetes mellitus, type 2     Disc degeneration, lumbar     Headache, tension-type     Hyperlipidemia     Hypothyroidism     Neuropathy     Osteoporosis 09/09/2015    Peripheral neuropathy     Rotator cuff tear, left     Scoliosis     Shoulder pain     LEFT, TORN ROTATOR CUFF S/P FALL    Spinal stenosis      Past Surgical History:   Procedure Laterality Date    APPENDECTOMY      BILATERAL BREAST REDUCTION Bilateral 08/2015    CATARACT EXTRACTION  03/2015    CHOLECYSTECTOMY WITH INTRAOPERATIVE CHOLANGIOGRAM N/A 3/27/2022    Procedure: CHOLECYSTECTOMY LAPAROSCOPIC INTRAOPERATIVE CHOLANGIOGRAM;  Surgeon: Aiyana Hill MD;  Location: [facility] OR;  Service: General;  Laterality: N/A;    COLONOSCOPY  06/05/2015    WNL    ERCP N/A 2/25/2022    Procedure: ENDOSCOPIC RETROGRADE CHOLANGIOPANCREATOGRAPHY with sphincterotomy and balloon sweep;  Surgeon: Chilo Wilhelm MD;  Location: [facility] ENDOSCOPY;  Service: Gastroenterology;  Laterality: N/A;  PRE/POST - CBD stones    ERCP N/A 3/28/2022    Procedure: ENDOSCOPIC RETROGRADE CHOLANGIOPANCREATOGRAPHY WITH SPHINCTEROTOMY AND BALLOON SWEEP;  Surgeon: Chilo Wilhelm MD;  Location: [facility] ENDOSCOPY;  Service: Gastroenterology;  Laterality: N/A;  PRE: COMMON DUCT STONE  POST: COMMON DUCT STONE    KYPHOPLASTY      REDUCTION  MAMMAPLASTY      TONSILLECTOMY        General Information       Row Name 04/29/24 1330          Physical Therapy Time and Intention    Document Type therapy note (daily note)  -EE     Mode of Treatment individual therapy;physical therapy  -EE       Row Name 04/29/24 1330          General Information    Existing Precautions/Restrictions fall  -EE     Barriers to Rehab medically complex;previous functional deficit  -EE       Row Name 04/29/24 1330          Cognition    Orientation Status (Cognition) oriented x 3  -EE       Row Name 04/29/24 1330          Safety Issues, Functional Mobility    Impairments Affecting Function (Mobility) balance;cognition;coordination;endurance/activity tolerance;strength;sensation/sensory awareness  -EE               User Key  (r) = Recorded By, (t) = Taken By, (c) = Cosigned By      Initials Name Provider Type    EE Vaishali Short, MELODIE Physical Therapist                   Mobility       Row Name 04/29/24 1331          Bed Mobility    Bed Mobility rolling right  -EE     Rolling Right [place] (Bed Mobility) moderate assist (50% patient effort);verbal cues  -EE     Supine-Sit [place] (Bed Mobility) minimum assist (75% patient effort)  with HOB maximally elevated  -EE     Sit-Supine [place]agher (Bed Mobility) moderate assist (50% patient effort);verbal cues  -EE       Row Name 04/29/24 1331          Bed-Chair Transfer    Bed-Chair Meagher (Transfers) moderate assist (50% patient effort);2 person assist;verbal cues  -EE     Assistive Device (Bed-Chair Transfers) walker, front-wheeled  -EE     Comment, (Bed-Chair Transfer) bed <> bsc, stand pivot with cues for posture and sequencing  -EE       Row Name 04/29/24 1331          Sit-Stand Transfer    Sit-Stand Meagher (Transfers) moderate assist (50% patient effort);2 person assist;verbal cues;nonverbal cues (demo/gesture)  -EE     Assistive Device (Sit-Stand Transfers) walker, front-wheeled  -EE     Comment, (Sit-Stand Transfer)  STS x3 from EOB, x2 from BSC, cues for hand placement  -EE       Row Name 04/29/24 1331          Gait/Stairs (Locomotion)    [place] Level (Gait) moderate assist (50% patient effort);2 person assist;verbal cues  -EE     Assistive Device (Gait) walker, front-wheeled  -EE     Distance in Feet (Gait) --  4 sidesteps to HOB  -EE     Deviations/Abnormal Patterns (Gait) tess decreased;stride length decreased;festinating/shuffling  -EE     Bilateral Gait Deviations forward flexed posture  -EE     Left Sided Gait Deviations decreased knee extension  -EE     Right Sided Gait Deviations decreased knee extension  -EE     Comment, (Gait/Stairs) Vc's and tc's for upright posture; cues for hip and knee extension. B knees remained flexed throughout  -EE               User Key  (r) = Recorded By, (t) = Taken By, (c) = Cosigned By      Initials Name Provider Type    EE Vaishali Short PT Physical Therapist                   Obj/Interventions       Row Name 04/29/24 1333          Motor Skills    Therapeutic Exercise --  B LAQ, marching while seated x10 reps  -EE       Row Name 04/29/24 1333          Balance    Balance Assessment standing dynamic balance  -EE     Static Sitting Balance supervision  -EE     Position, Sitting Balance unsupported  -EE     Static Standing Balance minimal assist;2-person assist;verbal cues  -EE     Dynamic Standing Balance moderate assist;2-person assist;verbal cues  -EE     Position/Device Used, Standing Balance walker, front-wheeled  -EE     Comment, Balance Static standing x4 trials with min/mod A x2 and BUE support on rwx. Pt stands with forward flexed posture and B knee flexion; able to correct slightly with vc's and tc's but unable to achieve full knee extension. Stood x1-2 min each trial.  -EE               User Key  (r) = Recorded By, (t) = Taken By, (c) = Cosigned By      Initials Name Provider Type    Vaishali Rg PT Physical Therapist                   Goals/Plan    No  documentation.                  Clinical Impression       Row Name 04/29/24 1347          Pain    Pretreatment Pain Rating 5/10  -EE     Posttreatment Pain Rating 5/10  -EE     Pain Location - Side/Orientation Bilateral  -EE     Pain Location - foot  -EE     Pain Intervention(s) Repositioned;Rest;Elevated  -EE       Row Name 04/29/24 1341          Plan of Care Review    Plan of Care Reviewed With patient  -EE     Progress improving  -EE     Outcome Evaluation Pt demos slow but steady progress with strength and endurance, as evidenced by small increase in activity tolerance today. Pt performed bed mobility with mod A, stood with mod A x2, and was able to take several steps and pivot to BSC with mod A x2 and rwx. Pt continues to fatigue very quickly with upright mobility and remains at increased risk of falls. Pt will continue to benefit from PT to address impairments and increase independence with mobility to reduce fall risk. Recommend DC to SNF.  -EE       Row Name 04/29/24 1342          Positioning and Restraints    Pre-Treatment Position in bed  -EE     Post Treatment Position bed  -EE     In Bed fowlers;call light within reach;encouraged to call for assist;exit alarm on;notified nsg  -EE               User Key  (r) = Recorded By, (t) = Taken By, (c) = Cosigned By      Initials Name Provider Type    EE Vaishali Short, MELODIE Physical Therapist                   Outcome Measures       Row Name 04/29/24 5860          How much help from another person do you currently need...    Turning from your back to your side while in flat bed without using bedrails? 2  -EE     Moving from lying on back to sitting on the side of a flat bed without bedrails? 2  -EE     Moving to and from a bed to a chair (including a wheelchair)? 2  -EE     Standing up from a chair using your arms (e.g., wheelchair, bedside chair)? 2  -EE     Climbing 3-5 steps with a railing? 1  -EE     To walk in hospital room? 2  -EE     AM-PAC 6 Clicks Score (PT)  11  -EE     Highest Level of Mobility Goal 4 --> Transfer to chair/commode  -EE               User Key  (r) = Recorded By, (t) = Taken By, (c) = Cosigned By      Initials Name Provider Type    EE Vaishali Short, MELODIE Physical Therapist                                 Physical Therapy Education       Title: PT OT SLP Therapies (Done)       Topic: Physical Therapy (Done)       Point: Mobility training (Done)       Learning Progress Summary             Patient Acceptance, E,TB, VU,NR by  at 4/29/2024 1349    Acceptance, E,D, NR by  at 4/26/2024 1657    Acceptance, E,TB,D, VU,NR by  at 4/25/2024 1544    Acceptance, E,D, NR by  at 4/24/2024 1721    Acceptance, E,D, VU,NR by  at 4/23/2024 1716    Acceptance, E, NR by  at 4/21/2024 1513                         Point: Home exercise program (Done)       Learning Progress Summary             Patient Acceptance, E,TB, VU,NR by  at 4/29/2024 1349    Acceptance, E,D, NR by  at 4/26/2024 1657    Acceptance, E,TB,D, VU,NR by  at 4/25/2024 1544    Acceptance, E,D, NR by  at 4/24/2024 1721    Acceptance, E,D, VU,NR by  at 4/23/2024 1716    Acceptance, E, NR by  at 4/21/2024 1513                                         User Key       Initials Effective Dates Name Provider Type Discipline     06/16/21 -  Vaishali Short, MELODIE Physical Therapist PT     03/07/18 -  Beena Ray PTA Physical Therapist Assistant PT     07/11/23 -  Helene Hernandez PT Physical Therapist PT                  PT Recommendation and Plan     Plan of Care Reviewed With: patient  Progress: improving  Outcome Evaluation: Pt demos slow but steady progress with strength and endurance, as evidenced by small increase in activity tolerance today. Pt performed bed mobility with mod A, stood with mod A x2, and was able to take several steps and pivot to BSC with mod A x2 and rwx. Pt continues to fatigue very quickly with upright mobility and remains at increased risk of falls. Pt will continue  to benefit from PT to address impairments and increase independence with mobility to reduce fall risk. Recommend DC to SNF.     Time Calculation:         PT Charges       Row Name 04/29/24 1349             Time Calculation    Start Time 1309  -EE      Stop Time 1344  -EE      Time Calculation (min) 35 min  -EE      PT Received On 04/29/24  -EE      PT - Next Appointment 04/30/24  -EE         Time Calculation- PT    Total Timed Code Minutes- PT 35 minute(s)  -EE         Timed Charges    15974 - PT Therapeutic Exercise Minutes 13  -EE      67718 - PT Therapeutic Activity Minutes 22  -EE         Total Minutes    Timed Charges Total Minutes 35  -EE       Total Minutes 35  -EE                User Key  (r) = Recorded By, (t) = Taken By, (c) = Cosigned By      Initials Name Provider Type    EE Vaishali Short, PT Physical Therapist                  Therapy Charges for Today       Code Description Service Date Service Provider Modifiers Qty    73089384411  PT THER PROC EA 15 MIN 4/29/2024 Vaishali Short, PT GP 1    86906934350 HC PT THERAPEUTIC ACT EA 15 MIN 4/29/2024 Vaishali Short, PT GP 1            PT G-Codes  AM-PAC 6 Clicks Score (PT): 11  PT Discharge Summary  Anticipated Discharge Disposition (PT): skilled nursing facility    Vaishali Short PT  4/29/2024

## 2024-04-29 NOTE — PROGRESS NOTES
[facility] Progress Note      Patient Name: Halie Guidry  : 1940  MRN: [id number]  Primary Care Physician:  Napoleon Mead MD  Date of admission: 2024    Subjective     Interval History:   Follow-up acute kidney injury on chronic kidney disease    The patient is feeling better, denies any chest pain or shortness of air, no orthopnea or PND, no nausea or vomiting, no dysuria or gross hematuria.    Review of Systems:   As noted above    Objective     Vitals:   Temp:  [97.5 °F (36.4 °C)-98.2 °F (36.8 °C)] 97.5 °F (36.4 °C)  Heart Rate:  [61-65] 63  Resp:  [15-18] 18  BP: (142-168)/(54-60) 168/57  Flow (L/min):  [2] 2    Intake/Output Summary (Last 24 hours) at 2024 1139  Last data filed at 2024 1749  Gross per 24 hour   Intake 480 ml   Output 600 ml   Net -120 ml       Physical Exam:    General Appearance: alert, awake, chronically ill, no acute distress   Skin: warm and dry  HEENT: oral mucosa normal, nonicteric sclera  Neck: supple, no JVD  Lungs: CTA, unlabored breathing effort  Heart: RRR, normal S1 and S2  Abdomen: soft, nontender, nondistended  : no palpable bladder  Extremities: Trace edema with erythema and venous stasis changes  Neuro: normal speech and mental status     Scheduled Meds:     amLODIPine, 5 mg, Oral, Daily  ammonium lactate, 1 Application, Topical, BID  atorvastatin, 80 mg, Oral, Nightly  bisoprolol, 5 mg, Oral, Daily  castor oil-balsam peru, 1 Application, Topical, Q12H  DULoxetine, 30 mg, Oral, Daily  enoxaparin, 30 mg, Subcutaneous, Nightly  HYDROcodone-acetaminophen, 1 tablet, Oral, TID  hydrocortisone-bacitracin-zinc oxide-nystatin, 1 Application, Topical, BID  insulin glargine, 40 Units, Subcutaneous, Q12H  insulin lispro, 12 Units, Subcutaneous, TID With Meals  insulin lispro, 2-7 Units, Subcutaneous, 4x Daily AC & at Bedtime  levothyroxine, 112 mcg, Oral, Once per day on   Saturday  levothyroxine, 168 mcg, Oral, Every Sunday  pantoprazole, 40 mg, Oral, Q AM  pregabalin, 75 mg, Oral, BID  sodium chloride, 10 mL, Intravenous, Q12H  [Held by provider] torsemide, 20 mg, Oral, Daily  cyanocobalamin, 1,000 mcg, Oral, Daily      IV Meds:            Results Reviewed:   I have personally reviewed the results from the time of this admission to 4/29/2024 11:39 EDT     Results from last 7 days   Lab Units 04/29/24  0651 04/28/24  0622 04/27/24  0554 04/26/24  0533 04/25/24  0736 04/24/24  0614 04/23/24  0547   SODIUM mmol/L 136 135* 136   < > 136 136 134*   POTASSIUM mmol/L 3.9 4.0 3.7   < > 3.7 3.4* 3.7   CHLORIDE mmol/L 99 98 96*   < > 98 98 97*   CO2 mmol/L 22.1 21.1* 24.2   < > 23.7 25.0 23.0   BUN mg/dL 60* 67* 67*   < > 53* 44* 37*   CREATININE mg/dL 1.95* 2.24* 2.47*   < > 2.37* 2.04* 1.80*   CALCIUM mg/dL 8.9 8.4* 8.8   < > 9.3 8.3* 8.6   BILIRUBIN mg/dL  --   --   --   --  0.4 0.3 0.3   ALK PHOS U/L  --   --   --   --  104 90 97   ALT (SGPT) U/L  --   --   --   --  9 9 9   AST (SGOT) U/L  --   --   --   --  14 10 10   GLUCOSE mg/dL 132* 274* 142*   < > 178* 279* 284*    < > = values in this interval not displayed.       Estimated Creatinine Clearance: 24.4 mL/min (A) (by C-G formula based on SCr of 1.95 mg/dL (H)).    Results from last 7 days   Lab Units 04/29/24  0651 04/28/24  0622 04/27/24  0554 04/26/24  0533 04/25/24  0736   MAGNESIUM mg/dL  --   --  2.0 2.1 2.3   PHOSPHORUS mg/dL 4.8* 5.5* 6.6* 5.4* 5.4*       Results from last 7 days   Lab Units 04/27/24  0554 04/26/24  0533 04/25/24  0736 04/24/24  0614 04/23/24  0547   URIC ACID mg/dL 9.8* 9.4* 9.0* 9.5* 8.9*       Results from last 7 days   Lab Units 04/29/24  0651 04/28/24  0622 04/27/24  0554 04/26/24  0533 04/25/24  0736   WBC 10*3/mm3 8.90 7.99 7.66 9.82 8.84   HEMOGLOBIN g/dL 11.6* 10.6* 11.5* 11.6* 12.6   PLATELETS 10*3/mm3 316 [phone]             Assessment / Plan     ASSESSMENT:  Acute kidney injury associated  with volume depletion and inability to autoregulate since the patient was taking ACE inhibitor renal function.  Creatinine little better down to 1.95 Mg/DL.  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    I reviewed the chart and other providers notes, reviewed labs.  I discussed the case with the patient and she was good understanding  Copied text in this note has been reviewed and is accurate as of 04/29/24.       Thank you for involving us in the care of Halie Guidry.  Please feel free to call with any questions.    Chidi Lanier MD  04/29/24  11:39 EDT    [facility]  [phone]    Please note that portions of this note were completed with a voice recognition program.

## 2024-04-29 NOTE — PROGRESS NOTES
"Nutrition Services    Patient Name:  Halie Guidry  YOB: 1940  MRN: [id number]  Admit Date:  4/20/2024    Assessment Date:  04/29/24    NUTRITION SCREENING      Reason for Encounter Follow-up protocol   Diagnosis/Problem Uncontrolled DM with hyperglycemia, VIPUL, CKD stage IIIb, HTN, diabetic neuropathy, hypothyroidism, pseudohyponatremia        PO Diet Diet: Diabetic; Consistent Carbohydrate; Fluid Consistency: Thin (IDDSI 0)   Supplements    PO Intake % %       Medications MAR reviewed by RD   Labs  Listed below, reviewed  Glu 196 at time of visit.   Physical Findings Oriented, tooth/teeth missing, room air, 2+ (mild) edema   GI Function Nondistended, fecal incontinence, last BM: 4/28   Skin Status Wound on bilateral coccyx       Height  Weight  BMI  Weight Trend     Height: 170.2 cm (67\")  Weight: 84.1 kg (185 lb 6.5 oz) (04/21/24 0034)  Body mass index is 29.04 kg/m².  Loss       Nutrition Problem (PES) No nutrition diagnosis at this time.       Intervention/Plan Encourage good PO intake.   Will continue to monitor skin status.  Hallucinations noted to have resolved.  Pt requested diabetes diet education handouts before DC, will follow up.    RD to follow up per protocol.     Results from last 7 days   Lab Units 04/29/24  0651 04/28/24  0622 04/27/24  0554 04/26/24  0533 04/25/24  0736 04/24/24  0614 04/23/24  0547   SODIUM mmol/L 136 135* 136   < > 136 136 134*   POTASSIUM mmol/L 3.9 4.0 3.7   < > 3.7 3.4* 3.7   CHLORIDE mmol/L 99 98 96*   < > 98 98 97*   CO2 mmol/L 22.1 21.1* 24.2   < > 23.7 25.0 23.0   BUN mg/dL 60* 67* 67*   < > 53* 44* 37*   CREATININE mg/dL 1.95* 2.24* 2.47*   < > 2.37* 2.04* 1.80*   CALCIUM mg/dL 8.9 8.4* 8.8   < > 9.3 8.3* 8.6   BILIRUBIN mg/dL  --   --   --   --  0.4 0.3 0.3   ALK PHOS U/L  --   --   --   --  104 90 97   ALT (SGPT) U/L  --   --   --   --  9 9 9   AST (SGOT) U/L  --   --   --   --  14 10 10   GLUCOSE mg/dL 132* 274* 142*   < > 178* 279* 284*    " < > = values in this interval not displayed.     Results from last 7 days   Lab Units 04/29/24  0651 04/28/24  0622 04/27/24  0554 04/26/24  0533 04/25/24  0736   MAGNESIUM mg/dL  --   --  2.0 2.1 2.3   PHOSPHORUS mg/dL 4.8*   < > 6.6* 5.4* 5.4*   HEMOGLOBIN g/dL 11.6*   < > 11.5* 11.6* 12.6   HEMATOCRIT % 36.0   < > 36.0 35.9 39.9    < > = values in this interval not displayed.     Lab Results   Component Value Date    HGBA1C 14.20 (H) 04/20/2024         Electronically signed by:  Arcleia Handley  04/29/24 10:32 EDT

## 2024-04-29 NOTE — CASE MANAGEMENT/SOCIAL WORK
Continued Stay Note  [facility]     Patient Name: Halie Guidry  MRN: [id number]  Today's Date: 4/29/2024    Admit Date: 4/20/2024    Plan: [facility]- accepted and bed available- pre-cert is pending   Discharge Plan       Row Name 04/29/24 1459       Plan    Plan [facility]- accepted and bed available- pre-cert is pending    Patient/Family in Agreement with Plan yes    Plan Comments Anticipate dc tomorrow. Spoke with Dayron/Drea and he confirms they can accept pending pre-cert. Patient updated and agreeable. Called and spoke with son, Derrick, and he is agreeable. He will transport patient to [facility] and has asked that CCP call him once an approximate dc time is known. Transfer packet in CCP office and pharmacy updated. E-gilbert sent asking that pre-cert be initiated. CCP will follow.                   Discharge Codes    No documentation.                 Expected Discharge Date and Time       Expected Discharge Date Expected Discharge Time    Apr 30, 2024               Rupali Dc RN

## 2024-04-30 ENCOUNTER — APPOINTMENT (OUTPATIENT)
Dept: ULTRASOUND IMAGING | Facility: HOSPITAL | Age: 84
End: 2024-04-30
Payer: MEDICARE

## 2024-04-30 ENCOUNTER — DOCUMENTATION (OUTPATIENT)
Dept: PSYCHIATRY | Facility: HOSPITAL | Age: 84
End: 2024-04-30
Payer: MEDICARE

## 2024-04-30 LAB
ALBUMIN SERPL-MCNC: 3.6 G/DL (ref 3.5–5.2)
ANION GAP SERPL CALCULATED.3IONS-SCNC: 17 MMOL/L (ref 5–15)
BASOPHILS # BLD AUTO: 0.1 10*3/MM3 (ref 0–0.2)
BASOPHILS NFR BLD AUTO: 1.1 % (ref 0–1.5)
BUN SERPL-MCNC: 62 MG/DL (ref 8–23)
BUN/CREAT SERPL: 30.5 (ref 7–25)
CALCIUM SPEC-SCNC: 8.9 MG/DL (ref 8.6–10.5)
CHLORIDE SERPL-SCNC: 98 MMOL/L (ref 98–107)
CO2 SERPL-SCNC: 23 MMOL/L (ref 22–29)
CREAT SERPL-MCNC: 2.03 MG/DL (ref 0.57–1)
DEPRECATED RDW RBC AUTO: 43.3 FL (ref 37–54)
EGFRCR SERPLBLD CKD-EPI 2021: 23.9 ML/MIN/1.73
EOSINOPHIL # BLD AUTO: 0.37 10*3/MM3 (ref 0–0.4)
EOSINOPHIL NFR BLD AUTO: 4.1 % (ref 0.3–6.2)
ERYTHROCYTE [DISTWIDTH] IN BLOOD BY AUTOMATED COUNT: 13.8 % (ref 12.3–15.4)
GLUCOSE BLDC GLUCOMTR-MCNC: 116 MG/DL (ref 70–130)
GLUCOSE BLDC GLUCOMTR-MCNC: 198 MG/DL (ref 70–130)
GLUCOSE BLDC GLUCOMTR-MCNC: 213 MG/DL (ref 70–130)
GLUCOSE BLDC GLUCOMTR-MCNC: 252 MG/DL (ref 70–130)
GLUCOSE SERPL-MCNC: 198 MG/DL (ref 65–99)
HCT VFR BLD AUTO: 37.5 % (ref 34–46.6)
HGB BLD-MCNC: 11.7 G/DL (ref 12–15.9)
IMM GRANULOCYTES # BLD AUTO: 0.05 10*3/MM3 (ref 0–0.05)
IMM GRANULOCYTES NFR BLD AUTO: 0.6 % (ref 0–0.5)
LYMPHOCYTES # BLD AUTO: 1.58 10*3/MM3 (ref 0.7–3.1)
LYMPHOCYTES NFR BLD AUTO: 17.5 % (ref 19.6–45.3)
MAGNESIUM SERPL-MCNC: 1.6 MG/DL (ref 1.6–2.4)
MCH RBC QN AUTO: 26.7 PG (ref 26.6–33)
MCHC RBC AUTO-ENTMCNC: 31.2 G/DL (ref 31.5–35.7)
MCV RBC AUTO: 85.6 FL (ref 79–97)
MONOCYTES # BLD AUTO: 0.89 10*3/MM3 (ref 0.1–0.9)
MONOCYTES NFR BLD AUTO: 9.9 % (ref 5–12)
NEUTROPHILS NFR BLD AUTO: 6.04 10*3/MM3 (ref 1.7–7)
NEUTROPHILS NFR BLD AUTO: 66.8 % (ref 42.7–76)
NRBC BLD AUTO-RTO: 0 /100 WBC (ref 0–0.2)
PHOSPHATE SERPL-MCNC: 6.2 MG/DL (ref 2.5–4.5)
PLATELET # BLD AUTO: 344 10*3/MM3 (ref 140–450)
PMV BLD AUTO: 9.7 FL (ref 6–12)
POTASSIUM SERPL-SCNC: 4.7 MMOL/L (ref 3.5–5.2)
RBC # BLD AUTO: 4.38 10*6/MM3 (ref 3.77–5.28)
SODIUM SERPL-SCNC: 138 MMOL/L (ref 136–145)
URATE SERPL-MCNC: 9.5 MG/DL (ref 2.4–5.7)
WBC NRBC COR # BLD AUTO: 9.03 10*3/MM3 (ref 3.4–10.8)

## 2024-04-30 PROCEDURE — 84550 ASSAY OF BLOOD/URIC ACID: CPT | Performed by: INTERNAL MEDICINE

## 2024-04-30 PROCEDURE — 80069 RENAL FUNCTION PANEL: CPT | Performed by: INTERNAL MEDICINE

## 2024-04-30 PROCEDURE — 63710000001 INSULIN GLARGINE PER 5 UNITS: Performed by: INTERNAL MEDICINE

## 2024-04-30 PROCEDURE — 82948 REAGENT STRIP/BLOOD GLUCOSE: CPT

## 2024-04-30 PROCEDURE — 97110 THERAPEUTIC EXERCISES: CPT

## 2024-04-30 PROCEDURE — 85025 COMPLETE CBC W/AUTO DIFF WBC: CPT | Performed by: STUDENT IN AN ORGANIZED HEALTH CARE EDUCATION/TRAINING PROGRAM

## 2024-04-30 PROCEDURE — 63710000001 INSULIN LISPRO (HUMAN) PER 5 UNITS: Performed by: HOSPITALIST

## 2024-04-30 PROCEDURE — 83735 ASSAY OF MAGNESIUM: CPT | Performed by: INTERNAL MEDICINE

## 2024-04-30 PROCEDURE — 76775 US EXAM ABDO BACK WALL LIM: CPT

## 2024-04-30 PROCEDURE — 63710000001 INSULIN GLARGINE PER 5 UNITS: Performed by: HOSPITALIST

## 2024-04-30 PROCEDURE — 25010000002 ENOXAPARIN PER 10 MG: Performed by: HOSPITALIST

## 2024-04-30 PROCEDURE — 97530 THERAPEUTIC ACTIVITIES: CPT

## 2024-04-30 PROCEDURE — 63710000001 INSULIN LISPRO (HUMAN) PER 5 UNITS: Performed by: STUDENT IN AN ORGANIZED HEALTH CARE EDUCATION/TRAINING PROGRAM

## 2024-04-30 RX ADMIN — HYDROCODONE BITARTRATE AND ACETAMINOPHEN 1 TABLET: 10; 325 TABLET ORAL at 17:11

## 2024-04-30 RX ADMIN — AMLODIPINE BESYLATE 5 MG: 5 TABLET ORAL at 09:13

## 2024-04-30 RX ADMIN — LEVOTHYROXINE SODIUM 112 MCG: 112 TABLET ORAL at 06:18

## 2024-04-30 RX ADMIN — Medication 1000 MCG: at 09:13

## 2024-04-30 RX ADMIN — CASTOR OIL AND BALSAM, PERU 1 APPLICATION: 788; 87 OINTMENT TOPICAL at 21:11

## 2024-04-30 RX ADMIN — INSULIN GLARGINE 40 UNITS: 100 INJECTION, SOLUTION SUBCUTANEOUS at 09:13

## 2024-04-30 RX ADMIN — ENOXAPARIN SODIUM 30 MG: 100 INJECTION SUBCUTANEOUS at 21:10

## 2024-04-30 RX ADMIN — ZINC OXIDE 1 APPLICATION: 200 OINTMENT TOPICAL at 21:11

## 2024-04-30 RX ADMIN — INSULIN LISPRO 12 UNITS: 100 INJECTION, SOLUTION INTRAVENOUS; SUBCUTANEOUS at 17:11

## 2024-04-30 RX ADMIN — PREGABALIN 75 MG: 75 CAPSULE ORAL at 21:11

## 2024-04-30 RX ADMIN — HYDROCODONE BITARTRATE AND ACETAMINOPHEN 1 TABLET: 10; 325 TABLET ORAL at 21:11

## 2024-04-30 RX ADMIN — PANTOPRAZOLE SODIUM 40 MG: 40 TABLET, DELAYED RELEASE ORAL at 06:18

## 2024-04-30 RX ADMIN — INSULIN GLARGINE 35 UNITS: 100 INJECTION, SOLUTION SUBCUTANEOUS at 16:52

## 2024-04-30 RX ADMIN — CASTOR OIL AND BALSAM, PERU 1 APPLICATION: 788; 87 OINTMENT TOPICAL at 09:15

## 2024-04-30 RX ADMIN — TORSEMIDE 40 MG: 20 TABLET ORAL at 09:13

## 2024-04-30 RX ADMIN — LIDOCAINE 1 APPLICATION: 4 CREAM TOPICAL at 21:11

## 2024-04-30 RX ADMIN — BISOPROLOL FUMARATE 5 MG: 5 TABLET, FILM COATED ORAL at 09:13

## 2024-04-30 RX ADMIN — INSULIN LISPRO 2 UNITS: 100 INJECTION, SOLUTION INTRAVENOUS; SUBCUTANEOUS at 21:11

## 2024-04-30 RX ADMIN — PREGABALIN 75 MG: 75 CAPSULE ORAL at 09:13

## 2024-04-30 RX ADMIN — HYDROCODONE BITARTRATE AND ACETAMINOPHEN 1 TABLET: 10; 325 TABLET ORAL at 09:13

## 2024-04-30 RX ADMIN — DULOXETINE HYDROCHLORIDE 30 MG: 30 CAPSULE, DELAYED RELEASE ORAL at 09:13

## 2024-04-30 RX ADMIN — Medication 10 ML: at 09:15

## 2024-04-30 RX ADMIN — INSULIN LISPRO 4 UNITS: 100 INJECTION, SOLUTION INTRAVENOUS; SUBCUTANEOUS at 11:54

## 2024-04-30 RX ADMIN — INSULIN LISPRO 3 UNITS: 100 INJECTION, SOLUTION INTRAVENOUS; SUBCUTANEOUS at 09:14

## 2024-04-30 RX ADMIN — Medication 10 ML: at 22:25

## 2024-04-30 RX ADMIN — ATORVASTATIN CALCIUM 80 MG: 80 TABLET, FILM COATED ORAL at 21:11

## 2024-04-30 RX ADMIN — INSULIN LISPRO 12 UNITS: 100 INJECTION, SOLUTION INTRAVENOUS; SUBCUTANEOUS at 11:54

## 2024-04-30 RX ADMIN — INSULIN LISPRO 12 UNITS: 100 INJECTION, SOLUTION INTRAVENOUS; SUBCUTANEOUS at 09:12

## 2024-04-30 RX ADMIN — LIDOCAINE 1 APPLICATION: 4 CREAM TOPICAL at 09:12

## 2024-04-30 RX ADMIN — ZINC OXIDE 1 APPLICATION: 200 OINTMENT TOPICAL at 09:14

## 2024-04-30 NOTE — PLAN OF CARE
Goal Outcome Evaluation:              Outcome Evaluation: pt alert and oriented x 4.  Probably d/c tomorrow 5/1.  Renal US completed at bedside this evening. wound care provided per orders. blood sugars more controlled today.

## 2024-04-30 NOTE — CASE MANAGEMENT/SOCIAL WORK
Post-Acute Authorization Submission      Post Acute Pre-Cert Documentation  Request Submitted by Facility - Type:: Hospital  Post-Acute Authorization Type Submitted:: SNF  Date Post Acute Pre-Cert Inititated per Facility: 04/29/24  Verification from Payer: Yes  Date Post Acute Pre-Cert Completed: 04/30/24  Accepting Facility: [facility] Discharge Date Requested: 04/30/24  All Clinicals Submitted?: Yes  Had Accepting Facility at Time of Submission: Yes  Response Received from Insurance?: Approval  Response Communicated to:: , Accepting Facility Liaison, Accepting Facility Auth Department  Authorization Number:: Approved [id number]  Post Acute Pre-Cert Initiated Comment: Kamilah Bennett RN

## 2024-04-30 NOTE — PLAN OF CARE
Goal Outcome Evaluation:  Plan of Care Reviewed With: patient        Progress: no change  Outcome Evaluation: Pt agreeable to PT and able to maintain activity level during therapy session today. Pt continues to demonstrate weakness, impaired balance, and decreased endurance limiting mobility. Pt performed bed mobility with CGA and then stood from EOB with mod A x2 and rwx. Pt able to take side steps with mod A x2 and rwx. Pt stands with forward flexed posture and fatigues quickly with upright mobility. Pt completed seated ther ex for strengthening. Pt will continue to benefit from PT to address impairments and continue to progress mobility as tolerated. Rec DC to SNF.      Anticipated Discharge Disposition (PT): skilled nursing facility

## 2024-04-30 NOTE — PROGRESS NOTES
[facility] Progress Note      Patient Name: Halie Guidry  : 1940  MRN: [id number]  Primary Care Physician:  Napoleon Mead MD  Date of admission: 2024    Subjective     Interval History:   Follow-up acute kidney injury on CKD 3B.  Urine not all recorded.  Bowels moving.  Eating well.  Blood pressure stable.  Review of Systems:   As noted above    Objective     Vitals:   Temp:  [97.3 °F (36.3 °C)-98.4 °F (36.9 °C)] 98.4 °F (36.9 °C)  Heart Rate:  [56-62] 62  Resp:  [18] 18  BP: (120-150)/(55-64) 120/64    Intake/Output Summary (Last 24 hours) at 2024 1526  Last data filed at 2024 1335  Gross per 24 hour   Intake 478 ml   Output 700 ml   Net -222 ml       Physical Exam:    General Appearance: alert, conversant.  No acute distress   Skin: warm and dry  HEENT: oral mucosa normal, nonicteric sclera  Neck: supple, no JVD  Lungs: Clear to auscultation bilaterally.  Heart: RRR, normal S1 and S2  Abdomen: soft, nontender, nondistended. +bs  : no palpable bladder  Extremities: Lower extremities very tender.  Chronic venous stasis.  Neuro: normal speech and mental status     Scheduled Meds:     amLODIPine, 5 mg, Oral, Daily  ammonium lactate, 1 Application, Topical, BID  atorvastatin, 80 mg, Oral, Nightly  bisoprolol, 5 mg, Oral, Daily  castor oil-balsam peru, 1 Application, Topical, Q12H  DULoxetine, 30 mg, Oral, Daily  enoxaparin, 30 mg, Subcutaneous, Nightly  HYDROcodone-acetaminophen, 1 tablet, Oral, TID  hydrocortisone-bacitracin-zinc oxide-nystatin, 1 Application, Topical, BID  insulin glargine, 35 Units, Subcutaneous, Every Afternoon  [START ON 2024] insulin glargine, 40 Units, Subcutaneous, QAM  insulin lispro, 12 Units, Subcutaneous, TID With Meals  insulin lispro, 2-7 Units, Subcutaneous, 4x Daily AC & at Bedtime  levothyroxine, 112 mcg, Oral, Once per day on  Saturday  levothyroxine, 168 mcg, Oral, Every  Sunday  pantoprazole, 40 mg, Oral, Q AM  pregabalin, 75 mg, Oral, BID  sodium chloride, 10 mL, Intravenous, Q12H  torsemide, 40 mg, Oral, Daily  cyanocobalamin, 1,000 mcg, Oral, Daily      IV Meds:        Results Reviewed:   I have personally reviewed the results from the time of this admission to 4/30/2024 15:26 EDT     Results from last 7 days   Lab Units 04/30/24  0543 04/29/24  0651 04/28/24  0622 04/26/24  0533 04/25/24  0736 04/24/24  0614   SODIUM mmol/L 138 136 135*   < > 136 136   POTASSIUM mmol/L 4.7 3.9 4.0   < > 3.7 3.4*   CHLORIDE mmol/L 98 99 98   < > 98 98   CO2 mmol/L 23.0 22.1 21.1*   < > 23.7 25.0   BUN mg/dL 62* 60* 67*   < > 53* 44*   CREATININE mg/dL 2.03* 1.95* 2.24*   < > 2.37* 2.04*   CALCIUM mg/dL 8.9 8.9 8.4*   < > 9.3 8.3*   BILIRUBIN mg/dL  --   --   --   --  0.4 0.3   ALK PHOS U/L  --   --   --   --  104 90   ALT (SGPT) U/L  --   --   --   --  9 9   AST (SGOT) U/L  --   --   --   --  14 10   GLUCOSE mg/dL 198* 132* 274*   < > 178* 279*    < > = values in this interval not displayed.       Estimated Creatinine Clearance: 23.4 mL/min (A) (by C-G formula based on SCr of 2.03 mg/dL (H)).    Results from last 7 days   Lab Units 04/30/24  0543 04/29/24  0651 04/28/24  0622 04/27/24  0554 04/26/24  0533   MAGNESIUM mg/dL 1.6  --   --  2.0 2.1   PHOSPHORUS mg/dL 6.2* 4.8* 5.5* 6.6* 5.4*       Results from last 7 days   Lab Units 04/30/24  0543 04/27/24  0554 04/26/24  0533 04/25/24  0736 04/24/24  0614   URIC ACID mg/dL 9.5* 9.8* 9.4* 9.0* 9.5*       Results from last 7 days   Lab Units 04/30/24  0543 04/29/24  0651 04/28/24  0622 04/27/24  0554 04/26/24  0533   WBC 10*3/mm3 9.03 8.90 7.99 7.66 9.82   HEMOGLOBIN g/dL 11.7* 11.6* 10.6* 11.5* 11.6*   PLATELETS 10*3/mm3 344 316 274 294 283             Assessment / Plan     ASSESSMENT:  Acute kidney injury on CKD 3B baseline creatinine 1.3.  Chronic disease due to diabetic and hypertensive glomerulosclerosis.  Acute component likely due to volume  depletion with ACE inhibitor.  Creatinine appears to have plateaued.  Check  renal ultrasound.  Diabetes mellitus type 2.  End-organ complications including nephropathy and neuropathy.  3.  Hypothyroidism on replacement.  4.  Pancreatic endocrine tumor.  Hematology oncology follows as an outpatient.  5.  Hypertension.  Controlled.  PLAN:  Renal ultrasound today.  If ultrasound negative and discharged tomorrow, needs basic metabolic panel next week with results faxed to [facility] [phone] kailyn Chen  3. Has follow up with [facility] May 15 at 10:15 am.   Thank you for involving us in the care of Halie Guidry.  Please feel free to call with any questions.    Mary Rice MD  04/30/24  15:26 EDT    [facility]  [phone]    Please note that portions of this note were completed with a voice recognition program.

## 2024-04-30 NOTE — THERAPY TREATMENT NOTE
Patient Name: Halie Guidry  : 1940    MRN: [id number]                              Today's Date: 2024       Admit Date: 2024    Visit Dx:     ICD-10-CM ICD-9-CM   1. Uncontrolled type 1 diabetes mellitus with hyperglycemia  E10.65 250.83     790.29   2. Acute renal failure, unspecified acute renal failure type  N17.9 584.9     Patient Active Problem List   Diagnosis    Compression fracture    Lumbar degenerative disc disease    Type 2 diabetes mellitus with hyperglycemia, with long-term current use of insulin    Hyperlipidemia    Benign essential hypertension    Primary hypothyroidism    Neuropathy    Osteoporosis    Proteinuria    Tobacco abuse    Diabetic eye exam    Generalized weakness    Spinal stenosis    Scoliosis    Arthritis    VBI (vertebrobasilar insufficiency)    Orthostatic hypotension    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    Severe hypothyroidism    Noncompliance with medication regimen    Vitamin D deficiency disease    Altered mental status    Tremor    Seizure    Stage 3a chronic kidney disease    Thoracic degenerative disc disease    Metabolic encephalopathy    VIPUL (acute kidney injury)    Hyperglycemia    Type II diabetes mellitus, uncontrolled    Lower abdominal pain    Transaminitis    UTI (urinary tract infection)    Emphysematous cystitis    Choledocholithiasis    Hypokalemia    Hypoxia    Generalized abdominal pain    History of Clostridium difficile infection    History of ERCP    Acute UTI (urinary tract infection)    Hypomagnesemia    Burning pain    Anxiety disorder    Neuropathic pain    Intertrigo    Weakness of both lower extremities    Accelerated hypertension    Acute cystitis without hematuria    Altered mental status, unspecified altered mental status type    Left lower lobe pneumonia    Acute pain of left foot    Cellulitis and abscess of left lower extremity    Hypertensive kidney disease with stage 3a chronic kidney disease     Bilateral leg pain    Peripheral edema    Primary malignant neuroendocrine tumor of pancreas    Encounter for long-term (current) use of high-risk medication    Diabetic foot ulcer    Acute renal insufficiency    C. difficile diarrhea    Sepsis    Stage 3a chronic kidney disease    Metabolic encephalopathy    Pressure injury of buttock, stage 2    Uncontrolled diabetes mellitus with hyperglycemia    HTN (hypertension)    Pseudohyponatremia     Past Medical History:   Diagnosis Date    Acute metabolic encephalopathy 6/24/2022    Anxiety     Arthritis     Benign essential hypertension 08/20/2014    Bleeding disorder     Depression     Diabetes     Diabetes mellitus, type 2     Disc degeneration, lumbar     Headache, tension-type     Hyperlipidemia     Hypothyroidism     Neuropathy     Osteoporosis 09/09/2015    Peripheral neuropathy     Rotator cuff tear, left     Scoliosis     Shoulder pain     LEFT, TORN ROTATOR CUFF S/P FALL    Spinal stenosis      Past Surgical History:   Procedure Laterality Date    APPENDECTOMY      BILATERAL BREAST REDUCTION Bilateral 08/2015    CATARACT EXTRACTION  03/2015    CHOLECYSTECTOMY WITH INTRAOPERATIVE CHOLANGIOGRAM N/A 3/27/2022    Procedure: CHOLECYSTECTOMY LAPAROSCOPIC INTRAOPERATIVE CHOLANGIOGRAM;  Surgeon: Aiyana Hill MD;  Location: [facility] OR;  Service: General;  Laterality: N/A;    COLONOSCOPY  06/05/2015    WNL    ERCP N/A 2/25/2022    Procedure: ENDOSCOPIC RETROGRADE CHOLANGIOPANCREATOGRAPHY with sphincterotomy and balloon sweep;  Surgeon: Chilo Wilhelm MD;  Location: [facility] ENDOSCOPY;  Service: Gastroenterology;  Laterality: N/A;  PRE/POST - CBD stones    ERCP N/A 3/28/2022    Procedure: ENDOSCOPIC RETROGRADE CHOLANGIOPANCREATOGRAPHY WITH SPHINCTEROTOMY AND BALLOON SWEEP;  Surgeon: Chilo Wilhelm MD;  Location: [facility] ENDOSCOPY;  Service: Gastroenterology;  Laterality: N/A;  PRE: COMMON DUCT STONE  POST: COMMON DUCT STONE    KYPHOPLASTY      REDUCTION  MAMMAPLASTY      TONSILLECTOMY        General Information       Row Name 04/30/24 1453          Physical Therapy Time and Intention    Document Type therapy note (daily note)  -EE     Mode of Treatment individual therapy;physical therapy  -EE       Row Name 04/30/24 1453          General Information    Existing Precautions/Restrictions fall  -EE       Row Name 04/30/24 1453          Cognition    Orientation Status (Cognition) oriented x 3  -EE       Row Name 04/30/24 1453          Safety Issues, Functional Mobility    Impairments Affecting Function (Mobility) balance;endurance/activity tolerance;pain;postural/trunk control;strength;sensation/sensory awareness  -EE               User Key  (r) = Recorded By, (t) = Taken By, (c) = Cosigned By      Initials Name Provider Type    EE Vaishali Short PT Physical Therapist                   Mobility       Row Name 04/30/24 1454          Bed Mobility    Supine-Sit [place] (Bed Mobility) contact guard;verbal cues  -EE     Sit-Supine [place] (Bed Mobility) contact guard;verbal cues  -EE     Assistive Device (Bed Mobility) head of bed elevated  -EE     Comment, (Bed Mobility) HOB maximally elevated when pt performing supine > sit. Increased time required to complete.  -EE       Row Name 04/30/24 1454          Sit-Stand Transfer    Sit-Stand [place] (Transfers) moderate assist (50% patient effort);2 person assist;verbal cues;nonverbal cues (demo/gesture)  -EE     Assistive Device (Sit-Stand Transfers) walker, front-wheeled  -EE     Comment, (Sit-Stand Transfer) STS x2 from EOB with cues for hand placement and anterior weight shifting.  -EE       Row Name 04/30/24 1450          Gait/Stairs (Locomotion)    [place] Level (Gait) minimum assist (75% patient effort);moderate assist (50% patient effort);2 person assist;verbal cues  -EE     Assistive Device (Gait) walker, front-wheeled  -EE     Distance in Feet (Gait) --  5 side steps at EOB  -EE      Deviations/Abnormal Patterns (Gait) tess decreased;stride length decreased;festinating/shuffling  -EE     Bilateral Gait Deviations forward flexed posture  -EE     Left Sided Gait Deviations decreased knee extension  -EE     Right Sided Gait Deviations decreased knee extension  -EE     Comment, (Gait/Stairs) Vc's and tc's for upright posture. Assist to move rwx during side stepping.  -EE               User Key  (r) = Recorded By, (t) = Taken By, (c) = Cosigned By      Initials Name Provider Type    Vaishali Rg PT Physical Therapist                   Obj/Interventions       Row Name 04/30/24 1455          Motor Skills    Therapeutic Exercise --  Seated B LAQ, marching while seated, B punches and B elbow flex/ext x10 reps.  -EE       Row Name 04/30/24 1455          Balance    Static Sitting Balance supervision  -EE     Position, Sitting Balance unsupported;sitting edge of bed  -EE     Static Standing Balance minimal assist;2-person assist;verbal cues  -EE     Dynamic Standing Balance moderate assist;2-person assist;verbal cues  -EE     Position/Device Used, Standing Balance supported;walker, front-wheeled  -EE     Comment, Balance Static standing x2 trials, 1-2 min each trial with cues for upright posture and B hip/knee extension.  -EE               User Key  (r) = Recorded By, (t) = Taken By, (c) = Cosigned By      Initials Name Provider Type    Vaishali Rg PT Physical Therapist                   Goals/Plan    No documentation.                  Clinical Impression       Row Name 04/30/24 1457          Pain    Pretreatment Pain Rating 5/10  -EE     Posttreatment Pain Rating 5/10  -EE     Pain Location - Side/Orientation Bilateral  -EE     Pain Location distal  -EE     Pain Location - foot  -EE     Pain Intervention(s) Repositioned;Elevated  -EE       Row Name 04/30/24 1455          Plan of Care Review    Plan of Care Reviewed With patient  -EE     Progress no change  -EE     Outcome Evaluation Pt  agreeable to PT and able to maintain activity level during therapy session today. Pt continues to demonstrate weakness, impaired balance, and decreased endurance limiting mobility. Pt performed bed mobility with CGA and then stood from EOB with mod A x2 and rwx. Pt able to take side steps with mod A x2 and rwx. Pt stands with forward flexed posture and fatigues quickly with upright mobility. Pt completed seated ther ex for strengthening. Pt will continue to benefit from PT to address impairments and continue to progress mobility as tolerated. Rec DC to SNF.  -EE       Row Name 04/30/24 1456          Positioning and Restraints    Pre-Treatment Position in bed  -EE     Post Treatment Position bed  -EE     In Bed fowlers;call light within reach;encouraged to call for assist;exit alarm on;notified nsg  -EE               User Key  (r) = Recorded By, (t) = Taken By, (c) = Cosigned By      Initials Name Provider Type    Vaishali Rg PT Physical Therapist                   Outcome Measures       Row Name 04/30/24 1455          How much help from another person do you currently need...    Turning from your back to your side while in flat bed without using bedrails? 2  -EE     Moving from lying on back to sitting on the side of a flat bed without bedrails? 2  -EE     Moving to and from a bed to a chair (including a wheelchair)? 2  -EE     Standing up from a chair using your arms (e.g., wheelchair, bedside chair)? 2  -EE     Climbing 3-5 steps with a railing? 1  -EE     To walk in hospital room? 2  -EE     AM-PAC 6 Clicks Score (PT) 11  -EE     Highest Level of Mobility Goal 4 --> Transfer to chair/commode  -EE               User Key  (r) = Recorded By, (t) = Taken By, (c) = Cosigned By      Initials Name Provider Type    Vaishali Rg PT Physical Therapist                                 Physical Therapy Education       Title: PT OT SLP Therapies (Done)       Topic: Physical Therapy (Done)       Point: Mobility  training (Done)       Learning Progress Summary             Patient Acceptance, E,D, VU,NR by  at 4/30/2024 1458    Acceptance, E,TB, VU,NR by  at 4/29/2024 1349    Acceptance, E,D, NR by  at 4/26/2024 1657    Acceptance, E,TB,D, VU,NR by  at 4/25/2024 1544    Acceptance, E,D, NR by  at 4/24/2024 1721    Acceptance, E,D, VU,NR by  at 4/23/2024 1716    Acceptance, E, NR by  at 4/21/2024 1513                         Point: Home exercise program (Done)       Learning Progress Summary             Patient Acceptance, E,D, VU,NR by  at 4/30/2024 1458    Acceptance, E,TB, VU,NR by  at 4/29/2024 1349    Acceptance, E,D, NR by  at 4/26/2024 1657    Acceptance, E,TB,D, VU,NR by  at 4/25/2024 1544    Acceptance, E,D, NR by  at 4/24/2024 1721    Acceptance, E,D, VU,NR by  at 4/23/2024 1716    Acceptance, E, NR by  at 4/21/2024 1513                                         User Key       Initials Effective Dates Name Provider Type Discipline     06/16/21 -  Vaishali Short, PT Physical Therapist PT     03/07/18 -  Beena Ray PTA Physical Therapist Assistant PT     07/11/23 -  Helene Hernandez PT Physical Therapist PT                  PT Recommendation and Plan     Plan of Care Reviewed With: patient  Progress: no change  Outcome Evaluation: Pt agreeable to PT and able to maintain activity level during therapy session today. Pt continues to demonstrate weakness, impaired balance, and decreased endurance limiting mobility. Pt performed bed mobility with CGA and then stood from EOB with mod A x2 and rwx. Pt able to take side steps with mod A x2 and rwx. Pt stands with forward flexed posture and fatigues quickly with upright mobility. Pt completed seated ther ex for strengthening. Pt will continue to benefit from PT to address impairments and continue to progress mobility as tolerated. Rec DC to SNF.     Time Calculation:         PT Charges       Row Name 04/30/24 3723             Time  Calculation    Start Time 1353  -EE      Stop Time 1416  -EE      Time Calculation (min) 23 min  -EE      PT Received On 04/30/24  -EE      PT - Next Appointment 05/01/24  -EE         Time Calculation- PT    Total Timed Code Minutes- PT 23 minute(s)  -EE         Timed Charges    63869 - PT Therapeutic Exercise Minutes 12  -EE      03940 - PT Therapeutic Activity Minutes 11  -EE         Total Minutes    Timed Charges Total Minutes 23  -EE       Total Minutes 23  -EE                User Key  (r) = Recorded By, (t) = Taken By, (c) = Cosigned By      Initials Name Provider Type    EE Vaishali Short, PT Physical Therapist                  Therapy Charges for Today       Code Description Service Date Service Provider Modifiers Qty    46022236308 HC PT THER PROC EA 15 MIN 4/29/2024 Vaishali Short, PT GP 1    08480590391 HC PT THERAPEUTIC ACT EA 15 MIN 4/29/2024 Vaishali Short, PT GP 1    44372325627 HC PT THER PROC EA 15 MIN 4/30/2024 Vaishali Short, PT GP 1    25064597310 HC PT THERAPEUTIC ACT EA 15 MIN 4/30/2024 Vaishali Short, PT GP 1    23680961619 HC PT THER SUPP EA 15 MIN 4/30/2024 Vaishali Short, PT GP 2            PT G-Codes  AM-PAC 6 Clicks Score (PT): 11  PT Discharge Summary  Anticipated Discharge Disposition (PT): skilled nursing facility    Vaishali Short PT  4/30/2024

## 2024-04-30 NOTE — PROGRESS NOTES
Name: Halie Guidry ADMIT: 2024   : 1940  PCP: Napoleon Mead MD    MRN: [id number] LOS: 9 days   AGE/SEX: 83 y.o. female  ROOM: [facility]     Subjective   Subjective   No change in lower extremity pain.  No polydipsia or polyuria.  No hypoglycemia symptoms..  No fever or chills.  No more hallucinations or delusions.    Review of Systems  Cardiovascular/respiratory.  No chest pain/no shortness of breath/no cough/no palpitation  .  No dysuria or hematuria  GI.  No abdominal pain or nausea or vomiting.       Objective   Objective   Vital Signs  Temp:  [97.3 °F (36.3 °C)-97.7 °F (36.5 °C)] 97.7 °F (36.5 °C)  Heart Rate:  [56-62] 62  Resp:  [18] 18  BP: (120-150)/(55-63) 150/63  SpO2:  [91 %-98 %] 91 %  on   ;   Device (Oxygen Therapy): room air    Intake/Output Summary (Last 24 hours) at 2024 0922  Last data filed at 2024 0500  Gross per 24 hour   Intake --   Output 700 ml   Net -700 ml     Body mass index is 29.04 kg/m².      24  1921 24  0034   Weight: 90.6 kg (199 lb 11.8 oz) 84.1 kg (185 lb 6.5 oz)     Physical Exam  General.  Elderly female.  Alert and oriented x 4.  In no apparent pain/distress/diaphoresis.  Normal mood and affect.  Eyes.  Pupils equal round and reactive.  Intact extraocular musculature.  No pallor or jaundice.  Oral cavity.  Moist mucous membranes  Neck.  Supple.  No JVD.  No lymphadenopathy or thyromegaly.  Cardiovascular.  Regular rate and rhythm and grade 2 systolic murmur.  Chest.  Clear to auscultation bilaterally with no added sounds.  Abdomen.  Soft lax.  No tenderness.  No organomegaly.  No guarding or rebound.  Extremities.  Trace bilateral lower extremity edema with erythema.  No clubbing or cyanosis.  CNS.  No acute focal neurological deficits.    Results Review:      Results from last 7 days   Lab Units 24  0543 24  0651 24  0622 24  0554 24  0533 24  0736 24  0614   SODIUM mmol/L 138 136 135* 136  "137  137 136 136   POTASSIUM mmol/L 4.7 3.9 4.0 3.7 3.7  3.7 3.7 3.4*   CHLORIDE mmol/L 98 99 98 96* 99  99 98 98   CO2 mmol/L 23.0 22.1 21.1* 24.2 23.4  23.4 23.7 25.0   BUN mg/dL 62* 60* 67* 67* 61*  61* 53* 44*   CREATININE mg/dL 2.03* 1.95* 2.24* 2.47* 2.44*  2.44* 2.37* 2.04*   GLUCOSE mg/dL 198* 132* 274* 142* 177*  177* 178* 279*   CALCIUM mg/dL 8.9 8.9 8.4* 8.8 8.8  8.8 9.3 8.3*   AST (SGOT) U/L  --   --   --   --   --  14 10   ALT (SGPT) U/L  --   --   --   --   --  9 9     Estimated Creatinine Clearance: 23.4 mL/min (A) (by C-G formula based on SCr of 2.03 mg/dL (H)).      Results from last 7 days   Lab Units 04/30/24  0624 04/29/24  2144 04/29/24  1704 04/29/24  1126 04/29/24  0604 04/28/24  2007 04/28/24  1600 04/28/24  1358   GLUCOSE mg/dL 213* 69* 96 196* 142* 131* 185* 196*             Results from last 7 days   Lab Units 04/29/24  1510   TSH uIU/mL 3.910     Results from last 7 days   Lab Units 04/30/24  0543 04/28/24  0622 04/27/24  0554 04/26/24  0533 04/25/24  0736 04/24/24  0614   MAGNESIUM mg/dL 1.6  --  2.0 2.1 2.3 1.5*   PHOSPHORUS mg/dL 6.2*   < > 6.6* 5.4* 5.4* 4.8*    < > = values in this interval not displayed.           Invalid input(s): \"LDLCALC\"  Results from last 7 days   Lab Units 04/30/24  0543 04/29/24  0651 04/28/24  0622 04/27/24  0554 04/26/24  0533 04/25/24  0736 04/24/24  0614   WBC 10*3/mm3 9.03 8.90 7.99 7.66 9.82 8.84 8.40   HEMOGLOBIN g/dL 11.7* 11.6* 10.6* 11.5* 11.6* 12.6 11.1*   HEMATOCRIT % 37.5 36.0 33.5* 36.0 35.9 39.9 34.4   PLATELETS 10*3/mm3 344 316 274 294 283 305 289   MCV fL 85.6 86.3 88.4 87.0 87.6 85.1 84.9   MCH pg 26.7 27.8 28.0 27.8 28.3 26.9 27.4   MCHC g/dL 31.2* 32.2 31.6 31.9 32.3 31.6 32.3   RDW % 13.8 13.8 14.1 14.3 14.1 14.1 13.8   RDW-SD fl 43.3 43.3 45.1 45.6 45.5 43.2 43.1   MPV fL 9.7 9.8 10.1 10.1 9.9 9.9 10.2   NEUTROPHIL % % 66.8 67.2 66.9 60.8 64.0 70.2 67.7   LYMPHOCYTE % % 17.5* 17.4* 17.8* 23.1 20.9 16.0* 18.2*   MONOCYTES % % 9.9 " 9.0 9.3 9.7 9.6 7.9 8.7   EOSINOPHIL % % 4.1 4.6 4.3 4.4 3.9 4.1 3.3   BASOPHIL % % 1.1 1.1 1.1 1.3 1.1 1.2 1.5   IMM GRAN % % 0.6* 0.7* 0.6* 0.7* 0.5 0.6* 0.6*   NEUTROS ABS 10*3/mm3 6.04 5.98 5.35 4.66 6.29 6.21 5.68   LYMPHS ABS 10*3/mm3 1.58 1.55 1.42 1.77 2.05 1.41 1.53   MONOS ABS 10*3/mm3 0.89 0.80 0.74 0.74 0.94* 0.70 0.73   EOS ABS 10*3/mm3 0.37 0.41* 0.34 0.34 0.38 0.36 0.28   BASOS ABS 10*3/mm3 0.10 0.10 0.09 0.10 0.11 0.11 0.13   IMMATURE GRANS (ABS) 10*3/mm3 0.05 0.06* 0.05 0.05 0.05 0.05 0.05   NRBC /100 WBC 0.0 0.0 0.0 0.0 0.0 0.0 0.0                                     Results from last 7 days   Lab Units 04/30/24  0543   URIC ACID mg/dL 9.5*       Imaging:  Imaging Results (Last 24 Hours)       ** No results found for the last 24 hours. **               I reviewed the patient's new clinical results / labs / tests / procedures      Assessment/Plan     Active Hospital Problems    Diagnosis  POA    **Uncontrolled diabetes mellitus with hyperglycemia [E11.65]  Yes    HTN (hypertension) [I10]  Unknown    Pseudohyponatremia [R79.89]  Unknown    Hypertensive kidney disease with stage 3a chronic kidney disease [I12.9, N18.31]  Yes    VIPUL (acute kidney injury) [N17.9]  Yes    Primary hypothyroidism [E03.9]  Yes      Resolved Hospital Problems   No resolved problems to display.           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  This has greatly improved on adjustment of the insulin.  Resolved hyperosmolar status.  Improved Accu-Chek but an episode of asymptomatic hypoglycemia at around 11 PM last night.  A1c is 14.  Lyrica has been increased during this admission.    Continue Lantus but decrease the p.m. dose by 5 units.  Continue lispro AC and sliding scale.  Hypothyroidism.  Continue current replacement.  Normal TSH.    Pseudohyponatremia secondary to hyperglycemia.  Resolved.  Acute on top of chronic stage IIIb chronic renal failure.  Acute  kidney failure is secondary to volume depletion in a patient on ACE.  ACE has been DC'd.  Status post IV fluid that has been since DC'd.  Patient volume status is normal now.  Nephrology is following and started torsemide today.  Creatinine has plateaued..  Baseline creatinine 1.3.  Chronic kidney failure secondary to diabetes and hypertension.  Await nephrology feedback.  Pancreatic endocrine tumor.  Hematology oncology following.  The plan is for octreotide as an outpatient with MRI with and without contrast to follow-up on the tumor after kidney function improves.  Hypertension.  Acutely acceptable on Norvasc and Zebeta.  No evidence of angina or congestive heart failure.  Continue to observe.  Visual hallucination.  Resolved.  Continue Cymbalta.  Negative CNS examination for focal deficits.  Status post psych consult.  Chronic disease anemia.  Hemoglobin is stable.  VTE prophylaxis.  On Lovenox.      Discussed my findings and plan of treatment with the patient/nurses and multidisciplinary rounds.  Disposition.  Hopefully to skilled later on this afternoon or tomorrow once released by nephrology.        Jazlyn Maynard MD  [facility]  04/30/24  09:22 EDT

## 2024-05-01 VITALS
HEIGHT: 67 IN | SYSTOLIC BLOOD PRESSURE: 131 MMHG | HEART RATE: 60 BPM | WEIGHT: 185.41 LBS | TEMPERATURE: 97.3 F | OXYGEN SATURATION: 96 % | RESPIRATION RATE: 18 BRPM | BODY MASS INDEX: 29.1 KG/M2 | DIASTOLIC BLOOD PRESSURE: 88 MMHG

## 2024-05-01 LAB
ALBUMIN SERPL-MCNC: 3.3 G/DL (ref 3.5–5.2)
ANION GAP SERPL CALCULATED.3IONS-SCNC: 15.2 MMOL/L (ref 5–15)
BASOPHILS # BLD AUTO: 0.07 10*3/MM3 (ref 0–0.2)
BASOPHILS NFR BLD AUTO: 0.9 % (ref 0–1.5)
BUN SERPL-MCNC: 64 MG/DL (ref 8–23)
BUN/CREAT SERPL: 31.5 (ref 7–25)
CALCIUM SPEC-SCNC: 8.6 MG/DL (ref 8.6–10.5)
CHLORIDE SERPL-SCNC: 99 MMOL/L (ref 98–107)
CO2 SERPL-SCNC: 23.8 MMOL/L (ref 22–29)
CREAT SERPL-MCNC: 2.03 MG/DL (ref 0.57–1)
CREAT UR-MCNC: 29.1 MG/DL
DEPRECATED RDW RBC AUTO: 44.4 FL (ref 37–54)
EGFRCR SERPLBLD CKD-EPI 2021: 23.9 ML/MIN/1.73
EOSINOPHIL # BLD AUTO: 0.43 10*3/MM3 (ref 0–0.4)
EOSINOPHIL NFR BLD AUTO: 5.3 % (ref 0.3–6.2)
ERYTHROCYTE [DISTWIDTH] IN BLOOD BY AUTOMATED COUNT: 14.3 % (ref 12.3–15.4)
GLUCOSE BLDC GLUCOMTR-MCNC: 156 MG/DL (ref 70–130)
GLUCOSE BLDC GLUCOMTR-MCNC: 215 MG/DL (ref 70–130)
GLUCOSE SERPL-MCNC: 148 MG/DL (ref 65–99)
HCT VFR BLD AUTO: 35.9 % (ref 34–46.6)
HGB BLD-MCNC: 11.6 G/DL (ref 12–15.9)
IMM GRANULOCYTES # BLD AUTO: 0.05 10*3/MM3 (ref 0–0.05)
IMM GRANULOCYTES NFR BLD AUTO: 0.6 % (ref 0–0.5)
LYMPHOCYTES # BLD AUTO: 1.55 10*3/MM3 (ref 0.7–3.1)
LYMPHOCYTES NFR BLD AUTO: 19.2 % (ref 19.6–45.3)
MCH RBC QN AUTO: 27.6 PG (ref 26.6–33)
MCHC RBC AUTO-ENTMCNC: 32.3 G/DL (ref 31.5–35.7)
MCV RBC AUTO: 85.5 FL (ref 79–97)
MONOCYTES # BLD AUTO: 0.64 10*3/MM3 (ref 0.1–0.9)
MONOCYTES NFR BLD AUTO: 7.9 % (ref 5–12)
NEUTROPHILS NFR BLD AUTO: 5.33 10*3/MM3 (ref 1.7–7)
NEUTROPHILS NFR BLD AUTO: 66.1 % (ref 42.7–76)
NRBC BLD AUTO-RTO: 0 /100 WBC (ref 0–0.2)
PHOSPHATE SERPL-MCNC: 6 MG/DL (ref 2.5–4.5)
PLATELET # BLD AUTO: 320 10*3/MM3 (ref 140–450)
PMV BLD AUTO: 9.5 FL (ref 6–12)
POTASSIUM SERPL-SCNC: 3.9 MMOL/L (ref 3.5–5.2)
RBC # BLD AUTO: 4.2 10*6/MM3 (ref 3.77–5.28)
SODIUM SERPL-SCNC: 138 MMOL/L (ref 136–145)
SODIUM UR-SCNC: 57 MMOL/L
WBC NRBC COR # BLD AUTO: 8.07 10*3/MM3 (ref 3.4–10.8)

## 2024-05-01 PROCEDURE — 84300 ASSAY OF URINE SODIUM: CPT | Performed by: INTERNAL MEDICINE

## 2024-05-01 PROCEDURE — 63710000001 INSULIN LISPRO (HUMAN) PER 5 UNITS: Performed by: HOSPITALIST

## 2024-05-01 PROCEDURE — 63710000001 INSULIN LISPRO (HUMAN) PER 5 UNITS: Performed by: STUDENT IN AN ORGANIZED HEALTH CARE EDUCATION/TRAINING PROGRAM

## 2024-05-01 PROCEDURE — 82570 ASSAY OF URINE CREATININE: CPT | Performed by: INTERNAL MEDICINE

## 2024-05-01 PROCEDURE — 80069 RENAL FUNCTION PANEL: CPT | Performed by: INTERNAL MEDICINE

## 2024-05-01 PROCEDURE — 82948 REAGENT STRIP/BLOOD GLUCOSE: CPT

## 2024-05-01 PROCEDURE — 63710000001 INSULIN GLARGINE PER 5 UNITS: Performed by: INTERNAL MEDICINE

## 2024-05-01 PROCEDURE — 85025 COMPLETE CBC W/AUTO DIFF WBC: CPT | Performed by: STUDENT IN AN ORGANIZED HEALTH CARE EDUCATION/TRAINING PROGRAM

## 2024-05-01 RX ORDER — PREGABALIN 75 MG/1
75 CAPSULE ORAL 2 TIMES DAILY
Qty: 6 CAPSULE | Refills: 0 | Status: SHIPPED | OUTPATIENT
Start: 2024-05-01

## 2024-05-01 RX ORDER — AMLODIPINE BESYLATE 5 MG/1
5 TABLET ORAL DAILY
Start: 2024-05-02

## 2024-05-01 RX ORDER — HYDROCODONE BITARTRATE AND ACETAMINOPHEN 10; 325 MG/1; MG/1
1 TABLET ORAL 3 TIMES DAILY
Qty: 9 TABLET | Refills: 0 | Status: SHIPPED | OUTPATIENT
Start: 2024-05-01

## 2024-05-01 RX ORDER — POLYETHYLENE GLYCOL 3350 17 G/17G
17 POWDER, FOR SOLUTION ORAL DAILY PRN
Start: 2024-05-01

## 2024-05-01 RX ORDER — INSULIN LISPRO 100 [IU]/ML
12 INJECTION, SOLUTION INTRAVENOUS; SUBCUTANEOUS
Start: 2024-05-01

## 2024-05-01 RX ORDER — INSULIN LISPRO 100 [IU]/ML
2-7 INJECTION, SOLUTION INTRAVENOUS; SUBCUTANEOUS
Start: 2024-05-01

## 2024-05-01 RX ORDER — CASTOR OIL AND BALSAM, PERU 788; 87 MG/G; MG/G
1 OINTMENT TOPICAL EVERY 12 HOURS SCHEDULED
Start: 2024-05-01

## 2024-05-01 RX ORDER — AMMONIUM LACTATE 12 G/100G
1 CREAM TOPICAL 2 TIMES DAILY
Start: 2024-05-01

## 2024-05-01 RX ADMIN — DULOXETINE HYDROCHLORIDE 30 MG: 30 CAPSULE, DELAYED RELEASE ORAL at 08:27

## 2024-05-01 RX ADMIN — INSULIN GLARGINE 40 UNITS: 100 INJECTION, SOLUTION SUBCUTANEOUS at 08:26

## 2024-05-01 RX ADMIN — PANTOPRAZOLE SODIUM 40 MG: 40 TABLET, DELAYED RELEASE ORAL at 06:14

## 2024-05-01 RX ADMIN — LIDOCAINE 1 APPLICATION: 4 CREAM TOPICAL at 08:27

## 2024-05-01 RX ADMIN — TORSEMIDE 40 MG: 20 TABLET ORAL at 08:27

## 2024-05-01 RX ADMIN — BISOPROLOL FUMARATE 5 MG: 5 TABLET, FILM COATED ORAL at 08:27

## 2024-05-01 RX ADMIN — CASTOR OIL AND BALSAM, PERU 1 APPLICATION: 788; 87 OINTMENT TOPICAL at 08:26

## 2024-05-01 RX ADMIN — INSULIN LISPRO 12 UNITS: 100 INJECTION, SOLUTION INTRAVENOUS; SUBCUTANEOUS at 08:26

## 2024-05-01 RX ADMIN — INSULIN LISPRO 2 UNITS: 100 INJECTION, SOLUTION INTRAVENOUS; SUBCUTANEOUS at 08:25

## 2024-05-01 RX ADMIN — INSULIN LISPRO 3 UNITS: 100 INJECTION, SOLUTION INTRAVENOUS; SUBCUTANEOUS at 11:31

## 2024-05-01 RX ADMIN — INSULIN LISPRO 12 UNITS: 100 INJECTION, SOLUTION INTRAVENOUS; SUBCUTANEOUS at 11:31

## 2024-05-01 RX ADMIN — AMLODIPINE BESYLATE 5 MG: 5 TABLET ORAL at 08:26

## 2024-05-01 RX ADMIN — HYDROCODONE BITARTRATE AND ACETAMINOPHEN 1 TABLET: 10; 325 TABLET ORAL at 08:26

## 2024-05-01 RX ADMIN — Medication 10 ML: at 08:27

## 2024-05-01 RX ADMIN — LEVOTHYROXINE SODIUM 112 MCG: 112 TABLET ORAL at 06:13

## 2024-05-01 RX ADMIN — PREGABALIN 75 MG: 75 CAPSULE ORAL at 08:27

## 2024-05-01 RX ADMIN — ZINC OXIDE 1 APPLICATION: 200 OINTMENT TOPICAL at 08:27

## 2024-05-01 RX ADMIN — Medication 1000 MCG: at 08:27

## 2024-05-01 NOTE — DISCHARGE SUMMARY
PHYSICIAN DISCHARGE SUMMARY                                                                        [facility]    Patient Identification:  Name: Halie Guidry  Age: 83 y.o.  Sex: female  :  1940  MRN: [id number]  Primary Care Physician: Napoleon Mead MD    Admit date: 2024  Discharge date and time: 2024     Discharged Condition: good    Discharge Diagnoses:  Grossly uncontrolled type 2 diabetes:  Hyperosmolar hyperglycemia: Resolved   Pseudohyponatremia: Resolved.  Well-differentiated pancreatic neuroendocrine tumor: Follow-up with oncology to reinitiate octreotide treatments.  CKD 3B/VIPUL: Improving.    Diabetic polyneuropathy:  Hypertension: .  Visual hallucinations: Resolved.     Hospital Course:  Pleasant 83-year-old female presented to emergency room with mental status changes.  Workup revealed a blood sugar in excess of 800 with a pseudohyponatremia.  This is associated with a normal anion gap and bicarb level.  She is initially aggressively hydrated.  She responded very nicely to subcu insulin.  With this her mental status returned to baseline.  Early on the hospitalization she did have visual hallucinations.  The patient states she realized that what she was seen was not real however.  These are resolved.  In addition her creatinine on presentation had gone up to 2.5 from a previous baseline of 1.3.  This did improve with hydration but seems to have plateaued about 2-2.03.  ACE inhibitor's have been put on hold.  This come to light that the patient has been out of insulin at home for least a month.  In addition she does have a neuroendocrine carcinoma and supposed be on octreotide and she has missed doses of this also apparently due to hospitalizations or inability to make oncology appointments.  She was seen by oncology also and they will reschedule her to have this resumed.  Presently she has no acute  complaints.      Consults:     Consults       Date and Time Order Name Status Description    4/23/2024  7:35 AM Inpatient Psychiatrist Consult Completed     4/21/2024  3:21 PM Inpatient Nephrology Consult Completed     4/21/2024 11:29 AM Hematology & Oncology Inpatient Consult Completed     4/20/2024 10:06 PM [facility] (on-call MD unless specified) Details                Discharge Exam:  Afebrile vital signs stable.  Well-developed well-nourished female no apparent distress.  Lungs clear to auscultation good air movement.  Heart regular rate and rhythm.  Extremities with no clubbing cyanosis edema.  She is alert oriented conversant cooperative and pleasant.     Disposition:  Skilled nursing facility    Patient Instructions:      Discharge Medications        New Medications        Instructions Start Date   ammonium lactate 12 % cream  Commonly known as: AMLACTIN   1 Application, Topical, 2 Times Daily      castor oil-balsam peru ointment   1 Application, Topical, Every 12 Hours Scheduled      hydrocortisone-bacitracin-zinc oxide-nystatin  Commonly known as: MAGIC BARRIER   1 Application, Topical, 2 Times Daily      insulin glargine 100 UNIT/ML injection  Commonly known as: LANTUS, SEMGLEE   35 Units, Subcutaneous, Every Afternoon      insulin glargine 100 UNIT/ML injection  Commonly known as: LANTUS, SEMGLEE   40 Units, Subcutaneous, Every Morning   Start Date: May 2, 2024     polyethylene glycol 17 g packet  Commonly known as: MIRALAX   17 g, Oral, Daily PRN      Torsemide 40 MG tablet   40 mg, Oral, Daily   Start Date: May 2, 2024            Changes to Medications        Instructions Start Date   amLODIPine 5 MG tablet  Commonly known as: NORVASC  What changed:   medication strength  how much to take   5 mg, Oral, Daily   Start Date: May 2, 2024     insulin lispro 100 UNIT/ML injection  Commonly known as: HUMALOG/ADMELOG  What changed:   how much to take  when to take this   12 Units, Subcutaneous, 3 Times Daily With  Meals      insulin lispro 100 UNIT/ML injection  Commonly known as: HUMALOG/ADMELOG  What changed:   how much to take  when to take this   2-7 Units, Subcutaneous, 4 Times Daily Before Meals & Nightly      levothyroxine 112 MCG tablet  Commonly known as: SYNTHROID, LEVOTHROID  What changed: Another medication with the same name was changed. Make sure you understand how and when to take each.   1.5 tablets, Oral, Weekly, Take on Sunday      levothyroxine 112 MCG tablet  Commonly known as: SYNTHROID, LEVOTHROID  What changed:   how much to take  how to take this  when to take this  additional instructions   levothyroxine 112 mcg 1 tablet daily, except on Sunday take 1.5 tablets.      pregabalin 75 MG capsule  Commonly known as: LYRICA  What changed:   medication strength  how much to take  when to take this   75 mg, Oral, 2 Times Daily             Continue These Medications        Instructions Start Date   atorvastatin 80 MG tablet  Commonly known as: LIPITOR   80 mg, Oral, Nightly      BD Pen Needle Naila 2nd Gen 32G X 4 MM misc  Generic drug: Insulin Pen Needle   USE TO INJECT INSULIN FOUR TIMES DAILY      bisoprolol 5 MG tablet  Commonly known as: ZEBeta   5 mg, Oral, Daily      DULoxetine 30 MG capsule  Commonly known as: CYMBALTA   30 mg, Oral, Daily      HYDROcodone-acetaminophen  MG per tablet  Commonly known as: NORCO   1 tablet, Oral, 3 Times Daily      lansoprazole 30 MG capsule  Commonly known as: PREVACID   30 mg, Oral, Daily      naloxone 4 MG/0.1ML nasal spray  Commonly known as: NARCAN   Call 911. Don't prime. [place] in 1 nostril for overdose. Repeat in 2-3 minutes in other nostril if no or minimal breathing/responsiveness.             Stop These Medications      CeraVe Moisturizing cream     furosemide 20 MG tablet  Commonly known as: LASIX     Insulin Glargine (1 Unit Dial) 300 UNIT/ML solution pen-injector injection  Commonly known as: TOUJEO     lisinopril 40 MG tablet  Commonly known as:  TU TANG            Diet Instructions       Diet: Diabetic Diets; Consistent Carbohydrate; Thin (IDDSI 0)      Discharge Diet: Diabetic Diets    Diabetic Diet: Consistent Carbohydrate    Fluid Consistency: Thin (IDDSI 0)          Future Appointments   Date Time Provider Department Center   5/17/2024 11:30 AM LAB CHAIR [facility]  [facility]   5/17/2024 12:00 PM Napoleon Gutierrez MD [facility]   5/17/2024 12:15 PM INJECTION CHAIR [facility]  [facility]     Additional Instructions for the Follow-ups that You Need to Schedule       Discharge Follow-up with PCP   As directed       Currently Documented PCP:    Napoleon Mead MD    PCP Phone Number:    [phone]     Follow Up Details: 1 week        Discharge Follow-up with Specialty: Oncology.  Nephrology.   As directed      Specialty: Oncology.  Nephrology.   Follow Up Details: As directed.        Basic Metabolic Panel    May 06, 2024 (Approximate)      Please fax results to [phone].   Attention Thee Chen MD.    Order Comments: Please fax results to [phone].   Attention Thee Chen MD.    Release to patient: Routine Release               Contact information for follow-up providers       Thee Chen APRN. Go on 5/15/2024.    Specialty: Nurse Practitioner  Why: Has follow-up with [facility] Chen May 15 at 10:15 AM.  Contact information:  [address]  [facility] [address]  [facility] [address]  [phone]               Napoleon Mead MD .    Specialty: Family Medicine  Why: 1 week  Contact information:  [address] [facility] [address]  [facility] [id number] [phone]                       Contact information for after-discharge care       Destination       [facility] .    Service: Skilled Nursing  Contact information:  [address] [place] [address] [phone]                                 Discharge Order (From admission, onward)       Start      Ordered    05/01/24 1126  Discharge patient  Once        Expected Discharge Date: 05/01/24   Discharge Disposition: Skilled Nursing Facility (DC - External)   Physician of Record for Attribution - Please select from Treatment Team: RAULITO DISLA [[id number]]   Review needed by CMO to determine Physician of Record: No      Question Answer Comment   Physician of Record for Attribution - Please select from Treatment Team RAULITO DISLA    Review needed by CMO to determine Physician of Record No        05/01/24 1140                      Total time spent discharging patient including evaluation,post hospitalization follow up,  medication and post hospitalization instructions and education total time exceeds 30 minutes.    Signed:  Raulito Disla MD  5/1/2024  11:40 EDT    EMR Dragon/Transcription disclaimer:   Much of this encounter note is an electronic transcription/translation of spoken language to printed text. The electronic translation of spoken language may permit erroneous, or at times, nonsensical words or phrases to be inadvertently transcribed; Although I have reviewed the note for such errors, some may still exist.

## 2024-05-01 NOTE — PROGRESS NOTES
Case Management Discharge Note      Final Note: Dc'd to skilled bed at [facility] via [facility]         Selected Continued Care - Discharged on 5/1/2024 Admission date: 4/20/2024 - Discharge disposition: Skilled Nursing Facility (DC - External)      Destination Coordination complete.      Service Provider Selected Services Address Phone Fax Patient Preferred    [facility] [address] [facility] [address] [phone] --                          Selected Continued Care - Prior Encounters Includes continued care and service providers with selected services from prior encounters from 1/21/2024 to 5/1/2024      Discharged on 2/10/2024 Admission date: 1/27/2024 - Discharge disposition: Skilled Nursing Facility (DC - External)      Destination       Service Provider Selected Services Address Phone Fax Patient Preferred    [facility] [address] [facility] [address] [phone] --                          Transportation Services  Ambulance: [facility]    Final Discharge Disposition Code: 03 - skilled nursing facility (SNF)

## 2024-05-01 NOTE — PROGRESS NOTES
Continued Stay Note  [facility]     Patient Name: Halie Guidry  MRN: [id number]  Today's Date: 5/1/2024    Admit Date: 4/20/2024    Plan: [facility] SNF - has pre-cert, via [facility]   Discharge Plan       Row Name 05/01/24 1259       Plan    Plan [facility] - has pre-cert, via [facility]    Patient/Family in Agreement with Plan yes    Plan Comments Per manager, pre-cert is approved for [facility].  DC orders in EPIC.  Spoke with Dayron and skilled bed available at [facility].  [facility] scheduled for 2:00 p.m.  Spoke with son Georgina by telephone and he is agreeable, ambulance disclaimer given.  Packet to PAUL.  Connor MILLER                      Expected Discharge Date and Time       Expected Discharge Date Expected Discharge Time    May 1, 2024               Becky S. [place], RN

## 2024-05-01 NOTE — PLAN OF CARE
Goal Outcome Evaluation:            Vss; stable for DC to [facility]

## 2024-05-15 NOTE — PROGRESS NOTES
.REASON FOR FOLLOW-UP:   pancreatic neuroendocrine tumor    History of Present Illness    This is a pleasant 83-year-old woman with type 2 diabetes, hypothyroidism, hypertension, stage IIIb CKD, anxiety disorder initially admitted on 7/21/2023 for generalized weakness and difficulty ambulating about her house.  She had a chest x-ray performed on 7/21/2023 which showed possible soft tissue in the right hilum and a follow-up CT chest was performed 7/24/2023 showing numerous mediastinal and right hilar lymph nodes predominantly calcified favored to represent prior granulomatous disease.  Visualized upper abdomen showed pneumobilia, calcified splenic granulomas, bilateral renal cortical lesions, intermediate density lesion upper pole of the right kidney 2 cm, small hyperdense lesion midpole of the kidney for which a CT pre and postcontrast renal protocol was recommended.  A CT abdomen/pelvis with and without contrast was performed on 7/26/23 which showed a slightly hyperdense slightly lobular partially exophytic nodule upper pole the right kidney with no postcontrast enhancement and a subcentimeter hyperdense nodule immediately stable in size.  There were no enhancing renal lesions.  Urinary bladder was thickened but nearly completely collapsed.  In the pancreatic head there was mild enhancement of a suspected mass 3.6 cm in diameter narrowing the common bile duct and a 2.8 x 2.2 cm lymph node or exophytic component inferiorly with mild dilation of the pancreatic duct.  The mass was suspicious for a neuroendocrine tumor radiographically.     The patient reports chronic diarrhea over the previous year or so but no weight loss or abdominal pain.  Blood sugars have been running elevated.  She denies facial flushing or hypoglycemic events.  Diarrhea has been worse over the past 2 to 3 weeks with multiple episodes of stool incontinence.    Laboratory studies showed a glucagon of 143, gastrin 236, somatostatin 21, CEA 3.09,  CA 19-9 44.3, chromogranin A 2296, proinsulin 6.3, insulin 7.3, pancreatic polypeptide 643.6.    After discharge the patient underwent an EGD on 8/4/2023 showing small hiatal hernia, erythema with erosions in the gastric antrum and body suggestive of erosive gastritis.  An endoscopic ultrasound was performed showing a pancreatic mass 3.5 x 3.0 cm in the pancreatic head and a second mass more circumscribed adjacently possibly communicating.  Fine-needle aspiration of the mass was performed showing a well-differentiated pancreatic neuroendocrine tumor    A dotatate scan on 9/13/2023 showed a pancreatic head mass 3.9 cm and adjacent positive peripancreatic lymph nodes consistent with metastatic disease, no visceral disease.    The patient was felt to be a poor candidate for surgery and initiated octreotide 20 mg on 9/13/2023.    The patient is seen today in follow-up.  She has missed the previous 3 months of octreotide secondary to multiple hospitalizations for uncontrolled diabetes (patient did not receive her Humalog from mail-order pharmacy), infections including C. difficile which was severe.  She is currently in a rehabilitation center doing reasonably well.  She is no longer having diarrhea.    Past Medical History, Past Surgical History, Social History, Family History have been reviewed and are without significant changes except as mentioned.    Review of Systems   Constitutional:  Positive for activity change and fatigue. Negative for unexpected weight change.   HENT: Negative.     Respiratory: Negative.     Cardiovascular:  Positive for leg swelling.   Gastrointestinal:  Negative for diarrhea.   Genitourinary: Negative.    Musculoskeletal:  Positive for gait problem. Negative for myalgias.   Neurological:  Positive for weakness. Negative for light-headedness.   Hematological: Negative.    Psychiatric/Behavioral:  Positive for dysphoric mood. Negative for confusion.           Medications:  The current  "medication list was reviewed in the EMR    ALLERGIES:    Allergies   Allergen Reactions    Sulfa Antibiotics Unknown - High Severity     Pt says it was a long time ago and I don't remember but it wasn't good.        Objective      Vitals:    05/17/24 1133   BP: 165/75   Pulse: 62   Resp: 18   Temp: 97.6 °F (36.4 °C)   TempSrc: Oral   SpO2: 99%   Height: 170.2 cm (67.01\")   PainSc:   3   PainLoc: Generalized  Comment: Feet and Legs           5/17/2024    11:26 AM   Current Status   ECOG score 2       Physical Exam    CONSTITUTIONAL: pleasant well-developed adult woman  HEENT: no icterus, no thrush, moist membranes  LYMPH: no cervical or supraclavicular lad  CV: RRR, S1S2, no murmur  RESP: cta bilat, no wheezing, no rales  GI: soft, non-tender, no splenomegaly, +bs  MUSC: 2-3+ bilateral lower extremity edema symmetric with scaly skin and faint erythema  NEURO: alert and oriented x3, mild global weakness  PSYCH: Dysphoric mood    RECENT LABS:  Hematology WBC   Date Value Ref Range Status   05/17/2024 12.37 (H) 3.40 - 10.80 10*3/mm3 Final   01/18/2021 9.4 3.4 - 10.8 x10E3/uL Final     RBC   Date Value Ref Range Status   05/17/2024 3.83 3.77 - 5.28 10*6/mm3 Final   01/18/2021 4.59 3.77 - 5.28 x10E6/uL Final     Hemoglobin   Date Value Ref Range Status   05/17/2024 11.0 (L) 12.0 - 15.9 g/dL Final     Hematocrit   Date Value Ref Range Status   05/17/2024 32.8 (L) 34.0 - 46.6 % Final     Platelets   Date Value Ref Range Status   05/17/2024 295 140 - 450 10*3/mm3 Final     Lab Results   Component Value Date    GLUCOSE 148 (H) 05/01/2024    BUN 64 (H) 05/01/2024    CREATININE 2.03 (H) 05/01/2024    EGFRRESULT 42.3 (L) 06/02/2023    EGFR 23.9 (L) 05/01/2024    BCR 31.5 (H) 05/01/2024    K 3.9 05/01/2024    CO2 23.8 05/01/2024    CALCIUM 8.6 05/01/2024    PROTENTOTREF 7.2 06/02/2023    ALBUMIN 3.3 (L) 05/01/2024    BILITOT 0.4 04/25/2024    AST 14 04/25/2024    ALT 9 04/25/2024          CT ABD/PELVIS 7/26/23:  IMPRESSION:  1. " There is a hyperenhancing mass at the pancreatic head measuring  approximately 3.6 cm and there is a 2.8 cm exophytic component or node  adjacently. The appearance is suspicious for a neuroendocrine tumor. The  mass significantly narrows the downstream portion of the common bile  duct, but there is no intrahepatic biliary dilatation and there is  pneumobilia. There is mild dilatation of the main pancreatic duct. For  tissue diagnosis, endoscopic ultrasound with FNA is recommended.  2. There are a few slightly hyperdense right renal nodules which likely  represent proteinaceous cysts. There are no enhancing or suspicious  renal lesions.       Assessment & Plan   *Well-differentiated pancreatic neuroendocrine tumor   Incidentally noted to have a hyperenhancing pancreatic head mass 3.6 cm with an adjacent 2.8 cm exophytic component or lymph node suspicious for NET radiographically by CT 7/26/2023  Laboratory studies showed a glucagon of 143, gastrin 236, somatostatin 21, CEA 3.09, CA 19-9 44.3, chromogranin A 2296, proinsulin 6.3, insulin 7.3, pancreatic polypeptide 643.6.  EGD on 8/4/2023 showing small hiatal hernia, erythema with erosions in the gastric antrum and body suggestive of erosive gastritis.  An endoscopic ultrasound was performed showing a pancreatic mass 3.5 x 3.0 cm in the pancreatic head and a second mass more circumscribed adjacently possibly communicating.  Fine-needle aspiration of the mass was performed showing a well-differentiated pancreatic neuroendocrine tumor  9/13/2023 dotatate scan-3.9 cm pancreatic head mass SUV 41.8 adjacent peripancreatic lymph node 2.1 cm SUV 38.4  9/13/2023-initiated octreotide 20 mg IM monthly; dose increased to 30 mg monthly 11/13/2023  Patient missed octreotide injections April 2024 secondary to hospitalizations        *Normocytic, normochromic anemia-hemoglobin 11.3 likely secondary to CKD 3   B12 572, folate 10.9, ferritin 32, iron sat 11%   Hemoglobin 11.0  today      *Comorbidities include venous insufficiency, hypertension, hyperlipidemia, CKD, hypothyroidism, advanced age-PS ECOG 2       Oncology plan/recommendations:  Continue octreotide  30 mg monthly IM  Dotatate PET scan prior to 4-week follow-up disease reassessment                    5/17/2024      CC:

## 2024-05-17 ENCOUNTER — INFUSION (OUTPATIENT)
Dept: ONCOLOGY | Facility: HOSPITAL | Age: 84
End: 2024-05-17
Payer: MEDICARE

## 2024-05-17 ENCOUNTER — OFFICE VISIT (OUTPATIENT)
Dept: ONCOLOGY | Facility: CLINIC | Age: 84
End: 2024-05-17
Payer: MEDICARE

## 2024-05-17 ENCOUNTER — LAB (OUTPATIENT)
Dept: LAB | Facility: HOSPITAL | Age: 84
End: 2024-05-17
Payer: MEDICARE

## 2024-05-17 VITALS
RESPIRATION RATE: 18 BRPM | DIASTOLIC BLOOD PRESSURE: 75 MMHG | BODY MASS INDEX: 29.03 KG/M2 | HEIGHT: 67 IN | SYSTOLIC BLOOD PRESSURE: 165 MMHG | OXYGEN SATURATION: 99 % | HEART RATE: 62 BPM | TEMPERATURE: 97.6 F

## 2024-05-17 DIAGNOSIS — C7A.8 PRIMARY MALIGNANT NEUROENDOCRINE TUMOR OF PANCREAS: Primary | ICD-10-CM

## 2024-05-17 DIAGNOSIS — Z79.899 ENCOUNTER FOR LONG-TERM (CURRENT) USE OF HIGH-RISK MEDICATION: ICD-10-CM

## 2024-05-17 DIAGNOSIS — N18.31 ANEMIA DUE TO STAGE 3A CHRONIC KIDNEY DISEASE: ICD-10-CM

## 2024-05-17 DIAGNOSIS — D63.1 ANEMIA DUE TO STAGE 3A CHRONIC KIDNEY DISEASE: ICD-10-CM

## 2024-05-17 DIAGNOSIS — C7A.8 PRIMARY MALIGNANT NEUROENDOCRINE TUMOR OF PANCREAS: ICD-10-CM

## 2024-05-17 PROBLEM — G93.41 METABOLIC ENCEPHALOPATHY: Status: RESOLVED | Noted: 2021-12-02 | Resolved: 2024-05-17

## 2024-05-17 PROBLEM — M79.604 BILATERAL LEG PAIN: Status: RESOLVED | Noted: 2023-07-21 | Resolved: 2024-05-17

## 2024-05-17 PROBLEM — N39.0 ACUTE UTI (URINARY TRACT INFECTION): Status: RESOLVED | Noted: 2022-03-27 | Resolved: 2024-05-17

## 2024-05-17 PROBLEM — R52 BURNING PAIN: Status: RESOLVED | Noted: 2022-05-27 | Resolved: 2024-05-17

## 2024-05-17 PROBLEM — N39.0 UTI (URINARY TRACT INFECTION): Status: RESOLVED | Noted: 2022-02-23 | Resolved: 2024-05-17

## 2024-05-17 PROBLEM — M79.672 ACUTE PAIN OF LEFT FOOT: Status: RESOLVED | Noted: 2023-05-13 | Resolved: 2024-05-17

## 2024-05-17 PROBLEM — E11.65 UNCONTROLLED DIABETES MELLITUS WITH HYPERGLYCEMIA: Status: RESOLVED | Noted: 2024-04-20 | Resolved: 2024-05-17

## 2024-05-17 PROBLEM — N17.9 AKI (ACUTE KIDNEY INJURY): Status: RESOLVED | Noted: 2021-12-02 | Resolved: 2024-05-17

## 2024-05-17 PROBLEM — A04.72 C. DIFFICILE DIARRHEA: Status: RESOLVED | Noted: 2024-01-15 | Resolved: 2024-05-17

## 2024-05-17 PROBLEM — N28.9 ACUTE RENAL INSUFFICIENCY: Status: RESOLVED | Noted: 2024-01-09 | Resolved: 2024-05-17

## 2024-05-17 PROBLEM — A41.9 SEPSIS: Status: RESOLVED | Noted: 2024-01-27 | Resolved: 2024-05-17

## 2024-05-17 PROBLEM — M79.605 BILATERAL LEG PAIN: Status: RESOLVED | Noted: 2023-07-21 | Resolved: 2024-05-17

## 2024-05-17 PROBLEM — R41.82 ALTERED MENTAL STATUS, UNSPECIFIED ALTERED MENTAL STATUS TYPE: Status: RESOLVED | Noted: 2022-11-04 | Resolved: 2024-05-17

## 2024-05-17 PROBLEM — G93.41 METABOLIC ENCEPHALOPATHY: Status: RESOLVED | Noted: 2024-01-28 | Resolved: 2024-05-17

## 2024-05-17 PROBLEM — R41.82 ALTERED MENTAL STATUS: Status: RESOLVED | Noted: 2017-12-15 | Resolved: 2024-05-17

## 2024-05-17 PROBLEM — R79.89 PSEUDOHYPONATREMIA: Status: RESOLVED | Noted: 2024-04-21 | Resolved: 2024-05-17

## 2024-05-17 LAB
BASOPHILS # BLD AUTO: 0.13 10*3/MM3 (ref 0–0.2)
BASOPHILS NFR BLD AUTO: 1.1 % (ref 0–1.5)
DEPRECATED RDW RBC AUTO: 48.1 FL (ref 37–54)
EOSINOPHIL # BLD AUTO: 0.74 10*3/MM3 (ref 0–0.4)
EOSINOPHIL NFR BLD AUTO: 6 % (ref 0.3–6.2)
ERYTHROCYTE [DISTWIDTH] IN BLOOD BY AUTOMATED COUNT: 15.6 % (ref 12.3–15.4)
HCT VFR BLD AUTO: 32.8 % (ref 34–46.6)
HGB BLD-MCNC: 11 G/DL (ref 12–15.9)
IMM GRANULOCYTES # BLD AUTO: 0.43 10*3/MM3 (ref 0–0.05)
IMM GRANULOCYTES NFR BLD AUTO: 3.5 % (ref 0–0.5)
LYMPHOCYTES # BLD AUTO: 1.36 10*3/MM3 (ref 0.7–3.1)
LYMPHOCYTES NFR BLD AUTO: 11 % (ref 19.6–45.3)
MCH RBC QN AUTO: 28.7 PG (ref 26.6–33)
MCHC RBC AUTO-ENTMCNC: 33.5 G/DL (ref 31.5–35.7)
MCV RBC AUTO: 85.6 FL (ref 79–97)
MONOCYTES # BLD AUTO: 1.07 10*3/MM3 (ref 0.1–0.9)
MONOCYTES NFR BLD AUTO: 8.6 % (ref 5–12)
NEUTROPHILS NFR BLD AUTO: 69.8 % (ref 42.7–76)
NEUTROPHILS NFR BLD AUTO: 8.64 10*3/MM3 (ref 1.7–7)
NRBC BLD AUTO-RTO: 0.4 /100 WBC (ref 0–0.2)
PLATELET # BLD AUTO: 295 10*3/MM3 (ref 140–450)
PMV BLD AUTO: 10.8 FL (ref 6–12)
RBC # BLD AUTO: 3.83 10*6/MM3 (ref 3.77–5.28)
WBC NRBC COR # BLD AUTO: 12.37 10*3/MM3 (ref 3.4–10.8)

## 2024-05-17 PROCEDURE — 85025 COMPLETE CBC W/AUTO DIFF WBC: CPT

## 2024-05-17 PROCEDURE — 25010000002 OCTREOTIDE PER 1 MG: Performed by: INTERNAL MEDICINE

## 2024-05-17 PROCEDURE — 96372 THER/PROPH/DIAG INJ SC/IM: CPT

## 2024-05-17 PROCEDURE — 36415 COLL VENOUS BLD VENIPUNCTURE: CPT

## 2024-05-17 RX ORDER — DOXAZOSIN MESYLATE 1 MG/1
1 TABLET ORAL DAILY
COMMUNITY

## 2024-05-17 RX ORDER — CHLORTHALIDONE 25 MG/1
1 TABLET ORAL DAILY
COMMUNITY

## 2024-05-17 RX ORDER — LISINOPRIL 40 MG/1
1 TABLET ORAL DAILY
COMMUNITY

## 2024-05-17 RX ADMIN — OCTREOTIDE ACETATE 30 MG: KIT at 12:08

## 2024-05-23 ENCOUNTER — TELEPHONE (OUTPATIENT)
Dept: ENDOCRINOLOGY | Age: 84
End: 2024-05-23
Payer: MEDICARE

## 2024-05-23 RX ORDER — INSULIN LISPRO 100 [IU]/ML
12 INJECTION, SOLUTION INTRAVENOUS; SUBCUTANEOUS
Qty: 15 ML | Refills: 0 | Status: SHIPPED | OUTPATIENT
Start: 2024-05-23

## 2024-05-23 NOTE — TELEPHONE ENCOUNTER
Caller: Halie Guidry    Relationship: Self    Best call back number: [phone]     Requested Prescriptions:   Requested Prescriptions      No prescriptions requested or ordered in this encounter    [facility]    Pharmacy where request should be sent:    [facility] [address]  Last office visit with prescribing clinician: 8/24/2023     Next office visit with prescribing clinician: 5/28/2024     Does the patient have less than a 3 day supply:  [x] Yes  [] No    Would you like a call back once the refill request has been completed: [x] Yes [] No    If the office needs to give you a call back, can they leave a voicemail: [x] Yes [] No    Jaylen Kendall Rep   05/23/24 15:01 EDT

## 2024-05-23 NOTE — TELEPHONE ENCOUNTER
Rx Refill Note  Requested Prescriptions     Pending Prescriptions Disp Refills    insulin lispro (HUMALOG/ADMELOG) 100 UNIT/ML injection       Sig: Inject 12 Units under the skin into the appropriate area as directed 3 (Three) Times a Day With Meals.      Last office visit with prescribing clinician: 8/24/2023   Last telemedicine visit with prescribing clinician: Visit date not found   Next office visit with prescribing clinician: 5/28/2024                         Would you like a call back once the refill request has been completed: [] Yes [] No    If the office needs to give you a call back, can they leave a voicemail: [] Yes [] No    Grecia Jenkins  05/23/24, 15:22 EDT

## 2024-06-03 ENCOUNTER — TELEPHONE (OUTPATIENT)
Dept: ENDOCRINOLOGY | Age: 84
End: 2024-06-03
Payer: MEDICARE

## 2024-06-04 ENCOUNTER — TELEPHONE (OUTPATIENT)
Dept: FAMILY MEDICINE CLINIC | Facility: CLINIC | Age: 84
End: 2024-06-04
Payer: MEDICARE

## 2024-06-04 RX ORDER — AMLODIPINE BESYLATE 5 MG/1
5 TABLET ORAL DAILY
Qty: 30 TABLET | Refills: 0
Start: 2024-06-04

## 2024-06-05 ENCOUNTER — TELEPHONE (OUTPATIENT)
Dept: ENDOCRINOLOGY | Age: 84
End: 2024-06-05
Payer: MEDICARE

## 2024-06-05 DIAGNOSIS — Z79.4 TYPE 2 DIABETES MELLITUS WITH OTHER CIRCULATORY COMPLICATION, WITH LONG-TERM CURRENT USE OF INSULIN: ICD-10-CM

## 2024-06-05 DIAGNOSIS — E11.59 TYPE 2 DIABETES MELLITUS WITH OTHER CIRCULATORY COMPLICATION, WITH LONG-TERM CURRENT USE OF INSULIN: ICD-10-CM

## 2024-06-05 RX ORDER — PEN NEEDLE, DIABETIC 32GX 5/32"
1 NEEDLE, DISPOSABLE MISCELLANEOUS 4 TIMES DAILY
Qty: 200 EACH | Refills: 0 | Status: SHIPPED | OUTPATIENT
Start: 2024-06-05

## 2024-06-05 NOTE — TELEPHONE ENCOUNTER
Rx Refill Note  Requested Prescriptions     Pending Prescriptions Disp Refills    Insulin Pen Needle (BD Pen Needle Naila 2nd Gen) 32G X 4 MM misc 200 each 0     Si each by Other route 4 (Four) Times a Day.      Last office visit with prescribing clinician: 2023   Last telemedicine visit with prescribing clinician: Visit date not found   Next office visit with prescribing clinician: 6/10/2024                         Would you like a call back once the refill request has been completed: [] Yes [] No    If the office needs to give you a call back, can they leave a voicemail: [] Yes [] No    Grecia Jenkins  24, 12:26 EDT

## 2024-06-07 ENCOUNTER — HOSPITAL ENCOUNTER (OUTPATIENT)
Dept: PET IMAGING | Facility: HOSPITAL | Age: 84
Discharge: HOME | End: 2024-06-07
Payer: MEDICARE

## 2024-06-10 ENCOUNTER — OFFICE VISIT (OUTPATIENT)
Dept: ENDOCRINOLOGY | Age: 84
End: 2024-06-10
Payer: MEDICARE

## 2024-06-10 VITALS
SYSTOLIC BLOOD PRESSURE: 134 MMHG | HEIGHT: 67 IN | WEIGHT: 170 LBS | OXYGEN SATURATION: 97 % | DIASTOLIC BLOOD PRESSURE: 82 MMHG | TEMPERATURE: 97.1 F | HEART RATE: 67 BPM | BODY MASS INDEX: 26.68 KG/M2

## 2024-06-10 DIAGNOSIS — E03.9 PRIMARY HYPOTHYROIDISM: ICD-10-CM

## 2024-06-10 DIAGNOSIS — Z79.4 TYPE 2 DIABETES MELLITUS WITH HYPERGLYCEMIA, WITH LONG-TERM CURRENT USE OF INSULIN: Primary | ICD-10-CM

## 2024-06-10 DIAGNOSIS — I12.9 BENIGN HYPERTENSION WITH CKD (CHRONIC KIDNEY DISEASE) STAGE IV: ICD-10-CM

## 2024-06-10 DIAGNOSIS — E11.65 TYPE 2 DIABETES MELLITUS WITH HYPERGLYCEMIA, WITH LONG-TERM CURRENT USE OF INSULIN: Primary | ICD-10-CM

## 2024-06-10 DIAGNOSIS — E78.5 HYPERLIPIDEMIA, UNSPECIFIED HYPERLIPIDEMIA TYPE: ICD-10-CM

## 2024-06-10 DIAGNOSIS — N18.4 BENIGN HYPERTENSION WITH CKD (CHRONIC KIDNEY DISEASE) STAGE IV: ICD-10-CM

## 2024-06-10 PROCEDURE — 99215 OFFICE O/P EST HI 40 MIN: CPT | Performed by: NURSE PRACTITIONER

## 2024-06-10 PROCEDURE — 3075F SYST BP GE 130 - 139MM HG: CPT | Performed by: NURSE PRACTITIONER

## 2024-06-10 PROCEDURE — 3079F DIAST BP 80-89 MM HG: CPT | Performed by: NURSE PRACTITIONER

## 2024-06-10 RX ORDER — FUROSEMIDE 20 MG/1
20 TABLET ORAL 2 TIMES DAILY
COMMUNITY

## 2024-06-10 RX ORDER — INSULIN LISPRO 100 [IU]/ML
12-17 INJECTION, SOLUTION INTRAVENOUS; SUBCUTANEOUS
Qty: 15 ML | Refills: 3 | Status: SHIPPED | OUTPATIENT
Start: 2024-06-10

## 2024-06-10 NOTE — PROGRESS NOTES
Chief Complaint  Chief Complaint   Patient presents with    Diabetes     Type 2: Pt is having trouble logging into Dexcom, is up to date on eye exam, no hx of retinopathy, moderate neuropathy. Pt stated that she is needing the Humalog pens sent to [facility].        Subjective          History of Present Illness    Halie Guidry 83 y.o. presents for a follow-up evaluation for type 2 DM    Last seen in office 08/24/23     She has been diabetic for more that 10 years     Pt is on the following medications for their DM: Toujeo 90 units daily and Admelog 12 units        Trulicity stopped due to diarrhea            Pt complains of numbness and tingling in feet/hands    Pt denies a history of diabetic retinopathy.  Last eye exam was 06/23     Pt does have nephropathy.  Patient is currently taking ACE   Follows with Nephrology  ACE stopped at 05/24 hospitalization     Pt does have neuropathy.  Current takes Lyrica 75 mg TID for this condition per PCP     Pt denies a history of CAD or CVA.    Last A1C in 04/24 was 14.2    Last microalbumin in 04/24 was positive          Blood Sugars    Blood glucoses are checked via Dexcom - unable to download    Look at 14 day graph on phone she is all over the place - significant hypoglycemia and hyperglycemia            Hypothyroid     Pt complains of dry skin and cold intolerance.     Current treatment is levothyroxine 112 mcg daily    Last labs in 04/24 showed TSH 3.910             Hyperlipidemia     Pt is currently taking atorvastatin 80 mg HS     Last lipid panel in 06/23 showed Total 138, HDL 33, LDL 82 and Triglycerides 130            Hypertension with CKD Stage 4       Current regimen includes bisoprolol 5 mg daily, doxazosin 1 mg daily and lasix 20 mg BID                    Pancreatic Endocrine Tumor     Pt had a CT of ABD/Pelvis on 07/26/23 which showed There is a hyperenhancing mass at the pancreatic head measuring approximately 3.6 cm and there is a 2.8 cm exophytic  "component or node adjacently. The appearance is suspicious for a neuroendocrine tumor. The mass significantly narrows the downstream portion of the common bile duct, but there is no intrahepatic biliary dilatation and there is pneumobilia. There is mild dilatation of the main pancreatic duct. For tissue diagnosis, endoscopic ultrasound with FNA is recommended        FNA on 08/04/23 showed Pancreatic Endocrine Tumor    Pt is taking monthly octreotide, but has missed doses due to hospitalizations      Follows with Dr. Garcia                 Other History     Pt was admitted in 05/24 and had been out of insulin for over a month            I have reviewed the patient's allergies, medicines, past medical hx, family hx and social hx.    Objective   Vital Signs:   /82   Pulse 67   Temp 97.1 °F (36.2 °C) (Temporal)   Ht 170.2 cm (67.01\")   Wt 77.1 kg (170 lb) Comment: PATIENT PROVIDED  SpO2 97%   BMI 26.62 kg/m²       Physical Exam   Physical Exam  Constitutional:       General: She is not in acute distress.     Appearance: Normal appearance. She is not diaphoretic.   HENT:      Head: Normocephalic and atraumatic.   Eyes:      General:         Right eye: No discharge.         Left eye: No discharge.   Musculoskeletal:      Right lower leg: Edema present.      Left lower leg: Edema present.   Skin:     General: Skin is warm and dry.   Neurological:      Mental Status: She is alert.   Psychiatric:         Mood and Affect: Mood normal.         Behavior: Behavior normal.                    Results Review:   Hemoglobin A1C   Date Value Ref Range Status   04/20/2024 14.20 (H) 4.80 - 5.60 % Final     Total Cholesterol   Date Value Ref Range Status   06/23/2022 120 0 - 200 mg/dL Final     Triglycerides   Date Value Ref Range Status   06/02/2023 130 0 - 150 mg/dL Final   06/23/2022 127 0 - 150 mg/dL Final     HDL Cholesterol   Date Value Ref Range Status   06/02/2023 33 (L) 40 - 60 mg/dL Final   06/23/2022 34 (L) 40 - 60 " "mg/dL Final     LDL Chol Calc (NIH)   Date Value Ref Range Status   06/02/2023 82 0 - 100 mg/dL Final     VLDL Cholesterol Gerald   Date Value Ref Range Status   06/02/2023 23 5 - 40 mg/dL Final     LDL/HDL Ratio   Date Value Ref Range Status   06/23/2022 1.78  Final         Assessment and Plan {CC Problem List  Visit Diagnosis  ROS  Review (Popup)  Health Maintenance  Quality  BestPractice  Medications  SmartSets  SnapShot Encounters  Media :23  Diagnoses and all orders for this visit:    1. Type 2 diabetes mellitus with hyperglycemia, with long-term current use of insulin (Primary)  -     Insulin Lispro, 1 Unit Dial, (HumaLOG KwikPen) 100 UNIT/ML solution pen-injector; Inject 12-17 Units under the skin into the appropriate area as directed 3 (Three) Times a Day Before Meals.  Dispense: 15 mL; Refill: 3    Hasn't been wearing Dexcom and can not log into clarity to download - I have no blood sugar readings to adjust medication. I looked at 14 day graph on her phone and she is all over the place - significant hypoglycemia and hyperglycemia.  Pt's son states that she does not eat well, eats throughout the day and high carb meals.  He also states that she does not always take her insulin.  Pt admits to not eating \"meals\" and when asked then when does she take her Admelog she did not answer and her son then said that she doesn't  Advised patient that if she does not eat consistent meals and then just randomly eats then doesn't eat for extended time period, we will never be able to control her blood sugars.  Pt's son stated that he believes she needs to be in an assisted living so they can help her with medications and diet; that he does not fully understand diabetes or have the time to fully help the way that she needs help.  Sounds like pt is Very Variable with taking her insulin or not taking it - she was recently admitted to the hospital because she stopped taking her insulin for a month.  Assisted living " may not be a bad idea as it would help with pt's diet and medication compliance   Continue with Toujeo 90 units daily   Continue with Admelog 12 units  Restart Dexcom and download in 2 weeks      2. Primary hypothyroidism    Continue with current levothyroxine dose      3. Hyperlipidemia, unspecified hyperlipidemia type    Continue with statin      4. Benign hypertension with CKD (chronic kidney disease) stage IV    Stable  Continue with current medication regimen  Defer management to PCP             Refills sent to pharmacy      RTC in 3 months with me      Follow Up     Patient was given instructions and counseling regarding her condition or for health maintenance advice. Please see specific information pulled into the AVS if appropriate.       Total time spent with patient and reviewing chart: 40 minutes           SERGE Reyna  06/10/24

## 2024-06-10 NOTE — PATIENT INSTRUCTIONS
Continue with Toujeo 90 units daily   Continue with Admelog 12 units  Restart Dexcom and download in 2 weeks

## 2024-06-17 ENCOUNTER — HOSPITAL ENCOUNTER (OUTPATIENT)
Dept: PET IMAGING | Facility: HOSPITAL | Age: 84
Discharge: HOME | End: 2024-06-17
Payer: MEDICARE

## 2024-06-17 DIAGNOSIS — C7A.8 PRIMARY MALIGNANT NEUROENDOCRINE TUMOR OF PANCREAS: ICD-10-CM

## 2024-06-17 PROCEDURE — 0 COPPER CU 64 DOTATATE 1 MCI/ML SOLUTION: Performed by: INTERNAL MEDICINE

## 2024-06-17 PROCEDURE — A9592 COPPER CU 64 DOTATATE 1 MCI/ML SOLUTION: HCPCS | Performed by: INTERNAL MEDICINE

## 2024-06-17 PROCEDURE — 78815 PET IMAGE W/CT SKULL-THIGH: CPT

## 2024-06-17 RX ADMIN — COPPER CU 64 DOTATATE 1 DOSE: 1 INJECTION, SOLUTION INTRAVENOUS at 14:17

## 2024-06-17 NOTE — PROGRESS NOTES
CC: Video Visit for Medical Management    Chief Complaint:   Chief Complaint   Patient presents with    neuropathic pain     Hosp follow up    ASSISTED LIVING FORMS TO BE COMPLETED     VIDEO VISIT  - PT HAS BEEN TOLD TO DO ECHECK IN THROUGH MY CHART     Anxiety    Depression     MED REFILL DUE        Halie Guidry 84 y.o. female who presents today for Medical Management of the below listed issues. She  has a problem list of   Patient Active Problem List   Diagnosis    Compression fracture    Lumbar degenerative disc disease    Type 2 diabetes mellitus with hyperglycemia, with long-term current use of insulin    Hyperlipidemia    Benign essential hypertension    Primary hypothyroidism    Neuropathy    Osteoporosis    Proteinuria    Tobacco abuse    Generalized weakness    Spinal stenosis    Scoliosis    Arthritis    VBI (vertebrobasilar insufficiency)    Orthostatic hypotension    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    Severe hypothyroidism    Noncompliance with medication regimen    Vitamin D deficiency disease    Tremor    Seizure    Thoracic degenerative disc disease    Hyperglycemia    Type II diabetes mellitus, uncontrolled    Lower abdominal pain    Transaminitis    Emphysematous cystitis    Choledocholithiasis    Hypokalemia    Hypoxia    Generalized abdominal pain    History of Clostridium difficile infection    History of ERCP    Hypomagnesemia    Anxiety disorder    Neuropathic pain    Intertrigo    Weakness of both lower extremities    Accelerated hypertension    Acute cystitis without hematuria    Left lower lobe pneumonia    Cellulitis and abscess of left lower extremity    Benign hypertension with CKD (chronic kidney disease) stage IV    Peripheral edema    Primary malignant neuroendocrine tumor of pancreas    Encounter for long-term (current) use of high-risk medication    Diabetic foot ulcer    Stage 3a chronic kidney disease    Pressure injury of buttock, stage 2    HTN  (hypertension)    Anemia due to stage 3a chronic kidney disease   .  Since the last visit, She had been admitted to the hospital back in April of this year, and that visit was as follows:    Admit date: 4/20/2024  Discharge date and time: 5/1/2024     Discharged Condition: good     Discharge Diagnoses:  Grossly uncontrolled type 2 diabetes:  Hyperosmolar hyperglycemia: Resolved   Pseudohyponatremia: Resolved.  Well-differentiated pancreatic neuroendocrine tumor: Follow-up with oncology to reinitiate octreotide treatments.  CKD 3B/VIPUL: Improving.    Diabetic polyneuropathy:  Hypertension: .  Visual hallucinations: Resolved.      Hospital Course:  Pleasant 83-year-old female presented to emergency room with mental status changes.  Workup revealed a blood sugar in excess of 800 with a pseudohyponatremia.  This is associated with a normal anion gap and bicarb level.  She is initially aggressively hydrated.  She responded very nicely to subcu insulin.  With this her mental status returned to baseline.  Early on the hospitalization she did have visual hallucinations.  The patient states she realized that what she was seen was not real however.  These are resolved.  In addition her creatinine on presentation had gone up to 2.5 from a previous baseline of 1.3.  This did improve with hydration but seems to have plateaued about 2-2.03.  ACE inhibitor's have been put on hold.  This come to light that the patient has been out of insulin at home for least a month.  In addition she does have a neuroendocrine carcinoma and supposed be on octreotide and she has missed doses of this also apparently due to hospitalizations or inability to make oncology appointments.  She was seen by oncology also and they will reschedule her to have this resumed.  Presently she has no acute complaints.    Current outpatient and discharge medications have been reconciled for the patient.  Reviewed by: Napoleon Mead MD    Since been seen several  specialist, including nephrology, psychiatry, endocrinology and hematology/oncology for a primary malignant neuroendocrine tumor of the pancreas.         she has been compliant with   Current Outpatient Medications:     bisoprolol (ZEBeta) 5 MG tablet, Take 1 tablet by mouth Daily., Disp: 90 tablet, Rfl: 3    DULoxetine (CYMBALTA) 30 MG capsule, Take 1 capsule by mouth Daily., Disp: 90 capsule, Rfl: 3    amLODIPine (NORVASC) 5 MG tablet, Take 1 tablet by mouth Daily., Disp: 30 tablet, Rfl: 0    ammonium lactate (AMLACTIN) 12 % cream, Apply 1 Application topically to the appropriate area as directed 2 (Two) Times a Day., Disp: , Rfl:     atorvastatin (LIPITOR) 80 MG tablet, Take 1 tablet by mouth Every Night. Indications: High Amount of Fats in the Blood, Disp: 30 tablet, Rfl: 2    castor oil-balsam peru (VENELEX) ointment, Apply 1 Application topically to the appropriate area as directed Every 12 (Twelve) Hours., Disp: , Rfl:     doxazosin (CARDURA) 1 MG tablet, Take 1 tablet by mouth Daily., Disp: , Rfl:     furosemide (LASIX) 20 MG tablet, Take 1 tablet by mouth 2 (Two) Times a Day., Disp: , Rfl:     HYDROcodone-acetaminophen (NORCO)  MG per tablet, Take 1 tablet by mouth 3 (Three) Times a Day., Disp: 9 tablet, Rfl: 0    hydrocortisone-bacitracin-zinc oxide-nystatin (MAGIC BARRIER), Apply 1 Application topically to the appropriate area as directed 2 (Two) Times a Day., Disp: , Rfl:     Insulin Glargine, 2 Unit Dial, (TOUJEO) 300 UNIT/ML solution pen-injector injection, Inject 90 Units under the skin into the appropriate area as directed Daily., Disp: , Rfl:     Insulin Lispro, 1 Unit Dial, (HumaLOG KwikPen) 100 UNIT/ML solution pen-injector, Inject 12-17 Units under the skin into the appropriate area as directed 3 (Three) Times a Day Before Meals., Disp: 15 mL, Rfl: 3    Insulin Pen Needle (BD Pen Needle Naila 2nd Gen) 32G X 4 MM misc, 1 each by Other route 4 (Four) Times a Day., Disp: 200 each, Rfl: 0     lansoprazole (PREVACID) 30 MG capsule, TAKE 1 CAPSULE DAILY, Disp: 30 capsule, Rfl: 11    levothyroxine (SYNTHROID, LEVOTHROID) 112 MCG tablet, levothyroxine 112 mcg 1 tablet daily, except on Sunday take 1.5 tablets. (Patient taking differently: Take 1 tablet by mouth See Admin Instructions. Take everyday EXCEPT Sunday take 1.5 tab), Disp: 96 tablet, Rfl: 0    naloxone (NARCAN) 4 MG/0.1ML nasal spray, Call 911. Don't prime. [place] in 1 nostril for overdose. Repeat in 2-3 minutes in other nostril if no or minimal breathing/responsiveness., Disp: 2 each, Rfl: 0    pregabalin (LYRICA) 75 MG capsule, Take 1 capsule by mouth 2 (Two) Times a Day. (Patient taking differently: Take 1 capsule by mouth 3 (Three) Times a Day.), Disp: 6 capsule, Rfl: 0.  She denies medication side effects.    All of the other chronic condition(s) listed above are stable w/o issues.    There were no vitals taken for this visit.    Results for orders placed or performed in visit on 05/17/24   CBC Auto Differential    Specimen: Blood   Result Value Ref Range    WBC 12.37 (H) 3.40 - 10.80 10*3/mm3    RBC 3.83 3.77 - 5.28 10*6/mm3    Hemoglobin 11.0 (L) 12.0 - 15.9 g/dL    Hematocrit 32.8 (L) 34.0 - 46.6 %    MCV 85.6 79.0 - 97.0 fL    MCH 28.7 26.6 - 33.0 pg    MCHC 33.5 31.5 - 35.7 g/dL    RDW 15.6 (H) 12.3 - 15.4 %    RDW-SD 48.1 37.0 - 54.0 fl    MPV 10.8 6.0 - 12.0 fL    Platelets 295 140 - 450 10*3/mm3    Neutrophil % 69.8 42.7 - 76.0 %    Lymphocyte % 11.0 (L) 19.6 - 45.3 %    Monocyte % 8.6 5.0 - 12.0 %    Eosinophil % 6.0 0.3 - 6.2 %    Basophil % 1.1 0.0 - 1.5 %    Immature Grans % 3.5 (H) 0.0 - 0.5 %    Neutrophils, Absolute 8.64 (H) 1.70 - 7.00 10*3/mm3    Lymphocytes, Absolute 1.36 0.70 - 3.10 10*3/mm3    Monocytes, Absolute 1.07 (H) 0.10 - 0.90 10*3/mm3    Eosinophils, Absolute 0.74 (H) 0.00 - 0.40 10*3/mm3    Basophils, Absolute 0.13 0.00 - 0.20 10*3/mm3    Immature Grans, Absolute 0.43 (H) 0.00 - 0.05 10*3/mm3    nRBC 0.4 (H) 0.0 -  0.2 /100 WBC             The following portions of the patient's history were reviewed and updated as appropriate: allergies, current medications, past family history, past medical history, past social history, past surgical history, and problem list.    Review of Systems   Constitutional:  Negative for activity change, chills and fever.   Respiratory:  Negative for cough.    Cardiovascular:  Negative for chest pain.   Psychiatric/Behavioral:  Negative for dysphoric mood.        Objective     BMI is >= 25 and <30. (Overweight) The following options were offered after discussion;: exercise counseling/recommendations and nutrition counseling/recommendations        Physical Exam  Constitutional:       General: She is not in acute distress.     Appearance: She is well-developed.   Pulmonary:      Effort: Pulmonary effort is normal.   Neurological:      Mental Status: She is alert and oriented to person, place, and time.   Psychiatric:         Behavior: Behavior normal.         Thought Content: Thought content normal.             Diagnoses and all orders for this visit:    1. Accelerated hypertension (Primary)  -     bisoprolol (ZEBeta) 5 MG tablet; Take 1 tablet by mouth Daily.  Dispense: 90 tablet; Refill: 3    2. Neuropathic pain  -     DULoxetine (CYMBALTA) 30 MG capsule; Take 1 capsule by mouth Daily.  Dispense: 90 capsule; Refill: 3    3. Anxiety  -     DULoxetine (CYMBALTA) 30 MG capsule; Take 1 capsule by mouth Daily.  Dispense: 90 capsule; Refill: 3    4. Spinal stenosis of lumbar region with neurogenic claudication  -     Miscellaneous DME    5. Thoracic degenerative disc disease  -     Miscellaneous DME    Patient is moving to assisted living at the end of the month, and we will write her a prescription for a hospital bed to be used in the facility due to her lumbar and thoracic stenosis issues leading to claudication and decreased mobility.  These will be necessary because she is does not have the strength to  readjust herself in bed and turn over on her own.    Spent  15   minutes with chart and interview and consent for this encounter given by the patient.  You have chosen to receive care through a telehealth visit.  Do you consent to use a video/audio connection for your medical care today? Yes  Patient was in their residence when this visit took place and I was in my medical office.

## 2024-06-18 ENCOUNTER — TELEMEDICINE (OUTPATIENT)
Dept: FAMILY MEDICINE CLINIC | Facility: CLINIC | Age: 84
End: 2024-06-18
Payer: MEDICARE

## 2024-06-18 ENCOUNTER — TELEPHONE (OUTPATIENT)
Dept: FAMILY MEDICINE CLINIC | Facility: CLINIC | Age: 84
End: 2024-06-18

## 2024-06-18 DIAGNOSIS — I10 ACCELERATED HYPERTENSION: Primary | Chronic | ICD-10-CM

## 2024-06-18 DIAGNOSIS — F41.9 ANXIETY: Chronic | ICD-10-CM

## 2024-06-18 DIAGNOSIS — M79.2 NEUROPATHIC PAIN: Chronic | ICD-10-CM

## 2024-06-18 DIAGNOSIS — M51.34 THORACIC DEGENERATIVE DISC DISEASE: ICD-10-CM

## 2024-06-18 DIAGNOSIS — M48.062 SPINAL STENOSIS OF LUMBAR REGION WITH NEUROGENIC CLAUDICATION: ICD-10-CM

## 2024-06-18 PROCEDURE — 1111F DSCHRG MED/CURRENT MED MERGE: CPT | Performed by: FAMILY MEDICINE

## 2024-06-18 PROCEDURE — 1125F AMNT PAIN NOTED PAIN PRSNT: CPT | Performed by: FAMILY MEDICINE

## 2024-06-18 PROCEDURE — 1159F MED LIST DOCD IN RCRD: CPT | Performed by: FAMILY MEDICINE

## 2024-06-18 PROCEDURE — 1160F RVW MEDS BY RX/DR IN RCRD: CPT | Performed by: FAMILY MEDICINE

## 2024-06-18 PROCEDURE — 99214 OFFICE O/P EST MOD 30 MIN: CPT | Performed by: FAMILY MEDICINE

## 2024-06-18 RX ORDER — DULOXETIN HYDROCHLORIDE 30 MG/1
30 CAPSULE, DELAYED RELEASE ORAL DAILY
Qty: 90 CAPSULE | Refills: 3 | Status: SHIPPED | OUTPATIENT
Start: 2024-06-18

## 2024-06-18 RX ORDER — BISOPROLOL FUMARATE 5 MG/1
5 TABLET, FILM COATED ORAL DAILY
Qty: 90 TABLET | Refills: 3 | Status: SHIPPED | OUTPATIENT
Start: 2024-06-18

## 2024-06-18 NOTE — TELEPHONE ENCOUNTER
PATIENT'S LAST OV FORMS HAVE BEEN FAXED TO [facility]   ATTN : LARRY PERALTA  [phone]   CONFIRMATION ATTACHED

## 2024-06-18 NOTE — PROGRESS NOTES
.REASON FOR FOLLOW-UP:   pancreatic neuroendocrine tumor    History of Present Illness    This is a pleasant 83-year-old woman with type 2 diabetes, hypothyroidism, hypertension, stage IIIb CKD, anxiety disorder initially admitted on 7/21/2023 for generalized weakness and difficulty ambulating about her house.  She had a chest x-ray performed on 7/21/2023 which showed possible soft tissue in the right hilum and a follow-up CT chest was performed 7/24/2023 showing numerous mediastinal and right hilar lymph nodes predominantly calcified favored to represent prior granulomatous disease.  Visualized upper abdomen showed pneumobilia, calcified splenic granulomas, bilateral renal cortical lesions, intermediate density lesion upper pole of the right kidney 2 cm, small hyperdense lesion midpole of the kidney for which a CT pre and postcontrast renal protocol was recommended.  A CT abdomen/pelvis with and without contrast was performed on 7/26/23 which showed a slightly hyperdense slightly lobular partially exophytic nodule upper pole the right kidney with no postcontrast enhancement and a subcentimeter hyperdense nodule immediately stable in size.  There were no enhancing renal lesions.  Urinary bladder was thickened but nearly completely collapsed.  In the pancreatic head there was mild enhancement of a suspected mass 3.6 cm in diameter narrowing the common bile duct and a 2.8 x 2.2 cm lymph node or exophytic component inferiorly with mild dilation of the pancreatic duct.  The mass was suspicious for a neuroendocrine tumor radiographically.     The patient reports chronic diarrhea over the previous year or so but no weight loss or abdominal pain.  Blood sugars have been running elevated.  She denies facial flushing or hypoglycemic events.  Diarrhea has been worse over the past 2 to 3 weeks with multiple episodes of stool incontinence.    Laboratory studies showed a glucagon of 143, gastrin 236, somatostatin 21, CEA 3.09,  CA 19-9 44.3, chromogranin A 2296, proinsulin 6.3, insulin 7.3, pancreatic polypeptide 643.6.    After discharge the patient underwent an EGD on 8/4/2023 showing small hiatal hernia, erythema with erosions in the gastric antrum and body suggestive of erosive gastritis.  An endoscopic ultrasound was performed showing a pancreatic mass 3.5 x 3.0 cm in the pancreatic head and a second mass more circumscribed adjacently possibly communicating.  Fine-needle aspiration of the mass was performed showing a well-differentiated pancreatic neuroendocrine tumor    A dotatate scan on 9/13/2023 showed a pancreatic head mass 3.9 cm and adjacent positive peripancreatic lymph nodes consistent with metastatic disease, no visceral disease.    The patient was felt to be a poor candidate for surgery and initiated octreotide 20 mg on 9/13/2023.  Octreotide was subsequently increased to 30 mg monthly.    The patient is seen today in follow-up.  She is doing pretty well.  Her diarrhea has resolved.  She denies abdominal pain.  She denies nausea vomiting.  She will be moving into an assisted living center soon.    Past Medical History, Past Surgical History, Social History, Family History have been reviewed and are without significant changes except as mentioned.    Review of Systems   Constitutional:  Positive for activity change and fatigue. Negative for unexpected weight change.   HENT: Negative.     Respiratory: Negative.     Cardiovascular:  Positive for leg swelling.   Gastrointestinal:  Negative for diarrhea.   Genitourinary: Negative.    Musculoskeletal:  Positive for gait problem. Negative for myalgias.   Neurological:  Positive for weakness. Negative for light-headedness.   Hematological: Negative.    Psychiatric/Behavioral:  Positive for dysphoric mood. Negative for confusion.    Unchanged-6/24/2024      Medications:  The current medication list was reviewed in the EMR    ALLERGIES:    Allergies   Allergen Reactions    Sulfa  Antibiotics Unknown - High Severity     Pt says it was a long time ago and I don't remember but it wasn't good.        Objective      There were no vitals filed for this visit.          5/17/2024    11:26 AM   Current Status   ECOG score 2       Physical Exam    CONSTITUTIONAL: pleasant well-developed adult woman  HEENT: no icterus, no thrush, moist membranes  LYMPH: no cervical or supraclavicular lad  CV: RRR, S1S2, no murmur  RESP: cta bilat, no wheezing, no rales  GI: soft, non-tender, no splenomegaly, +bs  MUSC: 2-3+ bilateral lower extremity edema symmetric with scaly skin and faint erythema  NEURO: alert and oriented x3, mild global weakness  PSYCH: Normal mood and affect today    RECENT LABS:  Hematology WBC   Date Value Ref Range Status   05/17/2024 12.37 (H) 3.40 - 10.80 10*3/mm3 Final   01/18/2021 9.4 3.4 - 10.8 x10E3/uL Final     RBC   Date Value Ref Range Status   05/17/2024 3.83 3.77 - 5.28 10*6/mm3 Final   01/18/2021 4.59 3.77 - 5.28 x10E6/uL Final     Hemoglobin   Date Value Ref Range Status   05/17/2024 11.0 (L) 12.0 - 15.9 g/dL Final     Hematocrit   Date Value Ref Range Status   05/17/2024 32.8 (L) 34.0 - 46.6 % Final     Platelets   Date Value Ref Range Status   05/17/2024 295 140 - 450 10*3/mm3 Final     Lab Results   Component Value Date    GLUCOSE 148 (H) 05/01/2024    BUN 64 (H) 05/01/2024    CREATININE 2.03 (H) 05/01/2024    EGFRRESULT 42.3 (L) 06/02/2023    EGFR 23.9 (L) 05/01/2024    BCR 31.5 (H) 05/01/2024    K 3.9 05/01/2024    CO2 23.8 05/01/2024    CALCIUM 8.6 05/01/2024    PROTENTOTREF 7.2 06/02/2023    ALBUMIN 3.3 (L) 05/01/2024    BILITOT 0.4 04/25/2024    AST 14 04/25/2024    ALT 9 04/25/2024          CT ABD/PELVIS 7/26/23:  IMPRESSION:  1. There is a hyperenhancing mass at the pancreatic head measuring  approximately 3.6 cm and there is a 2.8 cm exophytic component or node  adjacently. The appearance is suspicious for a neuroendocrine tumor. The  mass significantly narrows the  downstream portion of the common bile  duct, but there is no intrahepatic biliary dilatation and there is  pneumobilia. There is mild dilatation of the main pancreatic duct. For  tissue diagnosis, endoscopic ultrasound with FNA is recommended.  2. There are a few slightly hyperdense right renal nodules which likely  represent proteinaceous cysts. There are no enhancing or suspicious  renal lesions.     NM PET/CT Skull Base to Mid Thigh (06/17/2024 15:36)   FINDINGS: Mediastinal blood pool has a maximal SUV of 1.6, previously 1.1.  1. The dominant component of the pancreatic head mass on the 07/26/2023 CT measured approximately 4 cm, but the region of abnormal uptake on the 09/13/2023 PET/CT was approximately 6.4 cm. On the current PET/CT, the transverse diameter is 5.8 cm and there has been a decrease in the intensity of abnormal uptake with a current maximal SUV of 23.1, previously 41.8. The peripancreatic node posteriorly has a maximal SUV of 25.3, previously 38.4. No definite new abnormal uptake is seen within the abdomen. The pattern of liver activity is without significant change and there is no definite liver metastasis.  2. There is new low-level uptake at a subacute-appearing fracture at the  posterolateral aspect of the right 7th rib with an SUV of 4.1. The  overall low-level uptake is indeterminate. A pathologic fracture cannot  be entirely excluded. There are no other regions of new abnormal uptake.      Assessment & Plan   *Well-differentiated pancreatic neuroendocrine tumor   Incidentally noted to have a hyperenhancing pancreatic head mass 3.6 cm with an adjacent 2.8 cm exophytic component or lymph node suspicious for NET radiographically by CT 7/26/2023  Laboratory studies showed a glucagon of 143, gastrin 236, somatostatin 21, CEA 3.09, CA 19-9 44.3, chromogranin A 2296, proinsulin 6.3, insulin 7.3, pancreatic polypeptide 643.6.  EGD on 8/4/2023 showing small hiatal hernia, erythema with erosions in  the gastric antrum and body suggestive of erosive gastritis.  An endoscopic ultrasound was performed showing a pancreatic mass 3.5 x 3.0 cm in the pancreatic head and a second mass more circumscribed adjacently possibly communicating.  Fine-needle aspiration of the mass was performed showing a well-differentiated pancreatic neuroendocrine tumor  9/13/2023 dotatate scan-3.9 cm pancreatic head mass SUV 41.8 adjacent peripancreatic lymph node 2.1 cm SUV 38.4  9/13/2023-initiated octreotide 20 mg IM monthly; dose increased to 30 mg monthly 11/13/2023  Patient missed octreotide injections April 2024 secondary to hospitalizations  6/17/2024 dotatate PET-decreased size and activity of the pancreatic head mass, decreased size and activity of a peripancreatic lymph node, new uptake right seventh rib that I suspect is posttraumatic as opposed to metastatic disease          *Normocytic, normochromic anemia-hemoglobin 11.3 likely secondary to CKD 3   B12 572, folate 10.9, ferritin 32, iron sat 11%   Hemoglobin 10.7 today      *Comorbidities include venous insufficiency, hypertension, hyperlipidemia, CKD, hypothyroidism, advanced age-PS ECOG 2       Oncology plan/recommendations:  Continue octreotide  30 mg monthly IM  3-month practitioner visit CBC CMP octreotide injection                    6/18/2024      CC:

## 2024-06-24 ENCOUNTER — LAB (OUTPATIENT)
Dept: LAB | Facility: HOSPITAL | Age: 84
End: 2024-06-24
Payer: MEDICARE

## 2024-06-24 ENCOUNTER — INFUSION (OUTPATIENT)
Dept: ONCOLOGY | Facility: HOSPITAL | Age: 84
End: 2024-06-24
Payer: MEDICARE

## 2024-06-24 ENCOUNTER — OFFICE VISIT (OUTPATIENT)
Dept: ONCOLOGY | Facility: CLINIC | Age: 84
End: 2024-06-24
Payer: MEDICARE

## 2024-06-24 VITALS
HEIGHT: 67 IN | HEART RATE: 64 BPM | RESPIRATION RATE: 16 BRPM | SYSTOLIC BLOOD PRESSURE: 177 MMHG | BODY MASS INDEX: 26.68 KG/M2 | WEIGHT: 170 LBS | DIASTOLIC BLOOD PRESSURE: 77 MMHG | OXYGEN SATURATION: 96 % | TEMPERATURE: 97.5 F

## 2024-06-24 DIAGNOSIS — D63.1 ANEMIA DUE TO STAGE 3A CHRONIC KIDNEY DISEASE: ICD-10-CM

## 2024-06-24 DIAGNOSIS — C7A.8 PRIMARY MALIGNANT NEUROENDOCRINE TUMOR OF PANCREAS: Primary | ICD-10-CM

## 2024-06-24 DIAGNOSIS — C7A.8 PRIMARY MALIGNANT NEUROENDOCRINE TUMOR OF PANCREAS: ICD-10-CM

## 2024-06-24 DIAGNOSIS — Z79.899 ENCOUNTER FOR LONG-TERM (CURRENT) USE OF HIGH-RISK MEDICATION: ICD-10-CM

## 2024-06-24 DIAGNOSIS — N18.31 ANEMIA DUE TO STAGE 3A CHRONIC KIDNEY DISEASE: ICD-10-CM

## 2024-06-24 DIAGNOSIS — Z79.899 ENCOUNTER FOR LONG-TERM (CURRENT) USE OF HIGH-RISK MEDICATION: Primary | ICD-10-CM

## 2024-06-24 LAB
BASOPHILS # BLD AUTO: 0.11 10*3/MM3 (ref 0–0.2)
BASOPHILS NFR BLD AUTO: 1 % (ref 0–1.5)
DEPRECATED RDW RBC AUTO: 49.5 FL (ref 37–54)
EOSINOPHIL # BLD AUTO: 0.35 10*3/MM3 (ref 0–0.4)
EOSINOPHIL NFR BLD AUTO: 3.1 % (ref 0.3–6.2)
ERYTHROCYTE [DISTWIDTH] IN BLOOD BY AUTOMATED COUNT: 15.3 % (ref 12.3–15.4)
HCT VFR BLD AUTO: 34.2 % (ref 34–46.6)
HGB BLD-MCNC: 10.7 G/DL (ref 12–15.9)
IMM GRANULOCYTES # BLD AUTO: 0.06 10*3/MM3 (ref 0–0.05)
IMM GRANULOCYTES NFR BLD AUTO: 0.5 % (ref 0–0.5)
LYMPHOCYTES # BLD AUTO: 0.91 10*3/MM3 (ref 0.7–3.1)
LYMPHOCYTES NFR BLD AUTO: 7.9 % (ref 19.6–45.3)
MCH RBC QN AUTO: 27.7 PG (ref 26.6–33)
MCHC RBC AUTO-ENTMCNC: 31.3 G/DL (ref 31.5–35.7)
MCV RBC AUTO: 88.6 FL (ref 79–97)
MONOCYTES # BLD AUTO: 0.57 10*3/MM3 (ref 0.1–0.9)
MONOCYTES NFR BLD AUTO: 5 % (ref 5–12)
NEUTROPHILS NFR BLD AUTO: 82.5 % (ref 42.7–76)
NEUTROPHILS NFR BLD AUTO: 9.46 10*3/MM3 (ref 1.7–7)
NRBC BLD AUTO-RTO: 0 /100 WBC (ref 0–0.2)
PLATELET # BLD AUTO: 338 10*3/MM3 (ref 140–450)
PMV BLD AUTO: 9.3 FL (ref 6–12)
RBC # BLD AUTO: 3.86 10*6/MM3 (ref 3.77–5.28)
T4 FREE SERPL-MCNC: 1.16 NG/DL (ref 0.92–1.68)
TSH SERPL DL<=0.05 MIU/L-ACNC: 1.41 UIU/ML (ref 0.27–4.2)
WBC NRBC COR # BLD AUTO: 11.46 10*3/MM3 (ref 3.4–10.8)

## 2024-06-24 PROCEDURE — 99214 OFFICE O/P EST MOD 30 MIN: CPT | Performed by: INTERNAL MEDICINE

## 2024-06-24 PROCEDURE — 36415 COLL VENOUS BLD VENIPUNCTURE: CPT

## 2024-06-24 PROCEDURE — G2211 COMPLEX E/M VISIT ADD ON: HCPCS | Performed by: INTERNAL MEDICINE

## 2024-06-24 PROCEDURE — 3078F DIAST BP <80 MM HG: CPT | Performed by: INTERNAL MEDICINE

## 2024-06-24 PROCEDURE — 25010000002 OCTREOTIDE PER 1 MG: Performed by: INTERNAL MEDICINE

## 2024-06-24 PROCEDURE — 1125F AMNT PAIN NOTED PAIN PRSNT: CPT | Performed by: INTERNAL MEDICINE

## 2024-06-24 PROCEDURE — 96372 THER/PROPH/DIAG INJ SC/IM: CPT

## 2024-06-24 PROCEDURE — 3077F SYST BP >= 140 MM HG: CPT | Performed by: INTERNAL MEDICINE

## 2024-06-24 PROCEDURE — 84443 ASSAY THYROID STIM HORMONE: CPT | Performed by: INTERNAL MEDICINE

## 2024-06-24 PROCEDURE — 85025 COMPLETE CBC W/AUTO DIFF WBC: CPT

## 2024-06-24 PROCEDURE — 84439 ASSAY OF FREE THYROXINE: CPT | Performed by: INTERNAL MEDICINE

## 2024-06-24 RX ADMIN — OCTREOTIDE ACETATE 30 MG: KIT at 15:51

## 2024-06-26 DIAGNOSIS — G62.9 NEUROPATHY: Chronic | ICD-10-CM

## 2024-06-26 RX ORDER — FUROSEMIDE 20 MG/1
20 TABLET ORAL 2 TIMES DAILY
Qty: 30 TABLET | Refills: 0 | Status: SHIPPED | OUTPATIENT
Start: 2024-06-26

## 2024-06-26 RX ORDER — PREGABALIN 50 MG/1
50 CAPSULE ORAL 3 TIMES DAILY
Qty: 270 CAPSULE | OUTPATIENT
Start: 2024-06-26

## 2024-06-26 NOTE — TELEPHONE ENCOUNTER
Caller: BreaMariposaHalie C    Relationship: Self    Best call back number: [phone]    Requested Prescriptions:   Requested Prescriptions     Pending Prescriptions Disp Refills    furosemide (LASIX) 20 MG tablet       Sig: Take 1 tablet by mouth 2 (Two) Times a Day.        Pharmacy where request should be sent: [facility] #[id number] [address]  AT [facility] [phone] [facility] [phone]      Last office visit with prescribing clinician: 6/24/2024   Last telemedicine visit with prescribing clinician: Visit date not found   Next office visit with prescribing clinician: Visit date not found     Additional details provided by patient: SHE HAS A COUPLE OF WEEKS LEFT.    Does the patient have less than a 3 day supply:  [] Yes  [x] No    Would you like a call back once the refill request has been completed: [x] Yes [] No    If the office needs to give you a call back, can they leave a voicemail: [x] Yes [] No    Jaylen Pratt Rep   06/26/24 10:50 EDT

## 2024-07-01 DIAGNOSIS — M79.2 NEUROPATHIC PAIN: ICD-10-CM

## 2024-07-01 RX ORDER — PREGABALIN 75 MG/1
75 CAPSULE ORAL 3 TIMES DAILY
Qty: 270 CAPSULE | Refills: 0 | OUTPATIENT
Start: 2024-07-01

## 2024-07-01 NOTE — TELEPHONE ENCOUNTER
Caller: BreaHalie    Relationship: Self    Best call back number: [phone]    Requested Prescriptions:   Requested Prescriptions     Pending Prescriptions Disp Refills    pregabalin (LYRICA) 75 MG capsule 6 capsule 0     Sig: Take 1 capsule by mouth 2 (Two) Times a Day.        Pharmacy where request should be sent: [facility] #[id number] [facility] [address] [place] [address] AT [facility] [phone] [facility] [phone] FX     Last office visit with prescribing clinician: 5/25/2023   Last telemedicine visit with prescribing clinician: 6/18/2024   Next office visit with prescribing clinician: Visit date not found     PLEASE ADVISE ASAP. PATIENT STATES [facility] COULD NOT FILL THIS AT THIS TIME. PLEASE ADVISE.     PATIENT IS AT ASSISTED LIVING, PATIENT STARTED THIS YESTERDAY.     Does the patient have less than a 3 day supply:  [] Yes  [x] No    Would you like a call back once the refill request has been completed: [] Yes [x] No    If the office needs to give you a call back, can they leave a voicemail: [] Yes [x] No    Jaylen Collado Rep   07/01/24 10:13 EDT

## 2024-07-05 ENCOUNTER — TELEPHONE (OUTPATIENT)
Dept: FAMILY MEDICINE CLINIC | Facility: CLINIC | Age: 84
End: 2024-07-05

## 2024-07-05 DIAGNOSIS — M79.2 NEUROPATHIC PAIN: ICD-10-CM

## 2024-07-05 NOTE — TELEPHONE ENCOUNTER
Caller: Halie Guidry    Relationship: Self    Best call back number: [phone]    What orders are you requesting (i.e. lab or imaging): PHYSICAL THERAPY    In what timeframe would the patient need to come in: ASAP    Where will you receive your lab/imaging services: THE [facility]     Additional notes: SHE WOULD LIKE TO START THERAPY AT HER ASSISTED LIV[facility] HOME, AND THEY ARE REQUIRING AN ORDER.

## 2024-07-05 NOTE — TELEPHONE ENCOUNTER
Caller: Halie Guidry    Relationship: Self    Best call back number: [phone]    Requested Prescriptions:   Requested Prescriptions     Pending Prescriptions Disp Refills    pregabalin (LYRICA) 75 MG capsule 6 capsule 0     Sig: Take 1 capsule by mouth 2 (Two) Times a Day.        [id number] [address] AT [facility] [phone] [facility] [phone] FX     Last office visit with prescribing clinician: 5/25/2023   Last telemedicine visit with prescribing clinician: 6/18/2024   Next office visit with prescribing clinician: Visit date not found     Additional details provided by patient: PATIENT STATES THAT THIS HAS BEEN GIVEN TO HER BY DR. SILVA, BUT THE OTHER DOCTOR THAT IS LISTED ON IT IS FROM WHEN SHE WAS IN THE HOSPITAL. PLEASE FILL FOR PATIENT    Does the patient have less than a 3 day supply:  [] Yes  [x] No      Jaylen Ryan Rep   07/05/24 11:22 EDT

## 2024-07-10 DIAGNOSIS — E11.59 TYPE 2 DIABETES MELLITUS WITH OTHER CIRCULATORY COMPLICATION, WITH LONG-TERM CURRENT USE OF INSULIN: ICD-10-CM

## 2024-07-10 DIAGNOSIS — Z79.4 TYPE 2 DIABETES MELLITUS WITH OTHER CIRCULATORY COMPLICATION, WITH LONG-TERM CURRENT USE OF INSULIN: ICD-10-CM

## 2024-07-10 RX ORDER — PREGABALIN 75 MG/1
75 CAPSULE ORAL 2 TIMES DAILY
Qty: 6 CAPSULE | Refills: 0 | OUTPATIENT
Start: 2024-07-10

## 2024-07-10 NOTE — TELEPHONE ENCOUNTER
Incoming Refill Request      Medication requested (name and dose): pen needles    Pharmacy where [facility] should be sent: [facility] [address]    Additional details provided by patient:     Best call back number: [phone]    Does the patient have less than a 3 day supply:  [] Yes  [] No    Jaylen Choi Rep  07/10/24, 14:17 EDT

## 2024-07-10 NOTE — TELEPHONE ENCOUNTER
Caller: BreaHalie    Relationship: Self    Best call back number: [phone]     Requested Prescriptions:   Requested Prescriptions     Pending Prescriptions Disp Refills    pregabalin (LYRICA) 75 MG capsule 6 capsule 0     Sig: Take 1 capsule by mouth 2 (Two) Times a Day.        Pharmacy where request should be sent: [facility] #[id number] [facility] [address] [place] [address]  AT [facility] [phone] [facility] [phone] FX     Last office visit with prescribing clinician: 5/25/2023   Last telemedicine visit with prescribing clinician: 6/18/2024   Next office visit with prescribing clinician: 7/5/2024     Additional details provided by patient: WILL NEED NEW PRESCRIPTION AND ONLY HAS ONE WEEK LEFT .    THIS IS THE SECOND REQUEST FOR REFILL LAST ONE ON 7/05/24    Does the patient have less than a 3 day supply:  [] Yes  [x] No    Would you like a call back once the refill request has been completed: [] Yes [] No    If the office needs to give you a call back, can they leave a voicemail: [] Yes [] No    Jaylen Davidson Rep   07/10/24 10:24 EDT

## 2024-07-10 NOTE — TELEPHONE ENCOUNTER
Rx Refill Note  Requested Prescriptions     Pending Prescriptions Disp Refills    Insulin Pen Needle (BD Pen Needle Naila 2nd Gen) 32G X 4 MM misc 200 each 0     Si each by Other route 4 (Four) Times a Day.      Last office visit with prescribing clinician: 6/10/2024   Last telemedicine visit with prescribing clinician: Visit date not found   Next office visit with prescribing clinician: 9/10/2024                         Would you like a call back once the refill request has been completed: [] Yes [] No    If the office needs to give you a call back, can they leave a voicemail: [] Yes [] No    Grecia Jenkins  07/10/24, 14:19 EDT

## 2024-07-11 RX ORDER — PEN NEEDLE, DIABETIC 32GX 5/32"
1 NEEDLE, DISPOSABLE MISCELLANEOUS 4 TIMES DAILY
Qty: 400 EACH | Refills: 2 | Status: SHIPPED | OUTPATIENT
Start: 2024-07-11

## 2024-07-12 DIAGNOSIS — M79.2 NEUROPATHIC PAIN: ICD-10-CM

## 2024-07-12 NOTE — TELEPHONE ENCOUNTER
Caller: Brea Halie C    Relationship: Self    Best call back number: [phone] (Mobile)     Requested Prescriptions:   Requested Prescriptions     Pending Prescriptions Disp Refills    pregabalin (LYRICA) 75 MG capsule 6 capsule 0     Sig: Take 1 capsule by mouth 2 (Two) Times a Day.        Pharmacy where request should be sent: [facility] #[id number] [address]  AT [facility] [phone] [facility] [phone]      Last office visit with prescribing clinician: 5/25/2023   Last telemedicine visit with prescribing clinician: 6/18/2024   Next office visit with prescribing clinician: Visit date not found     Additional details provided by patient: PATIENT STATES SHE SHOULD  TABLETS. SHE IS ALMOST OUT OF MEDICATION.     Does the patient have less than a 3 day supply:  [x] Yes  [] No    Would you like a call back once the refill request has been completed: [] Yes [x] No    If the office needs to give you a call back, can they leave a voicemail: [] Yes [x] No    Jaylen Navarro Rep   07/12/24 10:54 EDT

## 2024-07-12 NOTE — TELEPHONE ENCOUNTER
FAX: [phone]  LARRY KATHRYN   PHYSICAL THERAPY     PATIENT STATES SHE IS AT THE [facility] IN [place] AND NEEDS A REFERRAL FOR PHYSICAL THERAPY SENT TO THE FAX ABOVE. PLEASE ADVISE. THANK YOU.

## 2024-07-15 NOTE — TELEPHONE ENCOUNTER
Hub staff attempted to follow warm transfer process and was unsuccessful     Caller: Halie Guidry    Relationship to patient: Self    Best call back number: [phone]    Patient is needing: PATIENT STATED THAT SHE  IS REQUESTING AN UPDATE ON REFILL REQUEST SENT TWO WEEKS AGO FOR LYRICA. PLEASE CALL PATIENT FOR UPDATE

## 2024-07-16 RX ORDER — PREGABALIN 75 MG/1
75 CAPSULE ORAL 2 TIMES DAILY
Qty: 6 CAPSULE | Refills: 0 | OUTPATIENT
Start: 2024-07-16

## 2024-07-16 NOTE — TELEPHONE ENCOUNTER
Hub staff attempted to follow warm transfer process and was unsuccessful     Caller: Halie Guidry    Relationship to patient: Self    Best call back number: [phone]     Patient is needing: PATIENT IS OUT OF THIS MEDICATION AND REALLY NEEDS IT SENT IN TODAY.    PLEASE CALL

## 2024-07-17 ENCOUNTER — TELEPHONE (OUTPATIENT)
Dept: FAMILY MEDICINE CLINIC | Facility: CLINIC | Age: 84
End: 2024-07-17

## 2024-07-17 ENCOUNTER — TELEPHONE (OUTPATIENT)
Dept: ENDOCRINOLOGY | Age: 84
End: 2024-07-17
Payer: MEDICARE

## 2024-07-17 DIAGNOSIS — M79.2 NEUROPATHIC PAIN: ICD-10-CM

## 2024-07-17 RX ORDER — PREGABALIN 75 MG/1
75 CAPSULE ORAL 2 TIMES DAILY
Qty: 6 CAPSULE | Refills: 0 | OUTPATIENT
Start: 2024-07-17

## 2024-07-17 RX ORDER — PREGABALIN 75 MG/1
75 CAPSULE ORAL 2 TIMES DAILY
Qty: 60 CAPSULE | Refills: 5 | Status: SHIPPED | OUTPATIENT
Start: 2024-07-17

## 2024-07-17 NOTE — TELEPHONE ENCOUNTER
Caller: BreaHalie    Relationship: Self    Best call back number: [phone]     Requested Prescriptions:   Requested Prescriptions     Pending Prescriptions Disp Refills    pregabalin (LYRICA) 75 MG capsule 6 capsule 0     Sig: Take 1 capsule by mouth 2 (Two) Times a Day.        Pharmacy where request should be sent: [facility] #[id number] [address]  AT [facility] [phone] [facility] [phone] FX     Last office visit with prescribing clinician: 5/25/2023   Last telemedicine visit with prescribing clinician: 6/18/2024   Next office visit with prescribing clinician: Visit date not found     Additional details provided by patient: THIS WAS LAST PRESCRIBED BY A DOCTOR AT THE HOSPITAL. PATIENT STATES SHE IS OUT OF THIS MEDICATION.    Does the patient have less than a 3 day supply:  [x] Yes  [] No    Would you like a call back once the refill request has been completed: [] Yes [x] No    Jaylen Corral Rep   07/17/24 09:44 EDT

## 2024-07-17 NOTE — TELEPHONE ENCOUNTER
Caller: Halie Guidry    Relationship: Self    Best call back number: [phone]     What was the call regarding: PATIENT IS REQUESTING TO SPEAK WITH DR. SILVA REGARDING pregabalin (LYRICA)     PLEASE ADVISE

## 2024-07-18 ENCOUNTER — HOSPITAL ENCOUNTER (EMERGENCY)
Facility: HOSPITAL | Age: 84
Discharge: HOME | End: 2024-07-18
Attending: EMERGENCY MEDICINE
Payer: MEDICARE

## 2024-07-18 VITALS
TEMPERATURE: 96.3 F | HEIGHT: 67 IN | HEART RATE: 74 BPM | BODY MASS INDEX: 26.68 KG/M2 | SYSTOLIC BLOOD PRESSURE: 169 MMHG | WEIGHT: 169.97 LBS | RESPIRATION RATE: 16 BRPM | OXYGEN SATURATION: 92 % | DIASTOLIC BLOOD PRESSURE: 66 MMHG

## 2024-07-18 DIAGNOSIS — E11.649 HYPOGLYCEMIA ASSOCIATED WITH DIABETES: Primary | ICD-10-CM

## 2024-07-18 LAB
ALBUMIN SERPL-MCNC: 4 G/DL (ref 3.5–5.2)
ALBUMIN/GLOB SERPL: 0.9 G/DL
ALP SERPL-CCNC: 155 U/L (ref 39–117)
ALT SERPL W P-5'-P-CCNC: 5 U/L (ref 1–33)
ANION GAP SERPL CALCULATED.3IONS-SCNC: 17.7 MMOL/L (ref 5–15)
AST SERPL-CCNC: 12 U/L (ref 1–32)
BACTERIA UR QL AUTO: ABNORMAL /HPF
BASOPHILS # BLD AUTO: 0.07 10*3/MM3 (ref 0–0.2)
BASOPHILS NFR BLD AUTO: 0.5 % (ref 0–1.5)
BILIRUB SERPL-MCNC: 0.3 MG/DL (ref 0–1.2)
BILIRUB UR QL STRIP: NEGATIVE
BUN SERPL-MCNC: 18 MG/DL (ref 8–23)
BUN/CREAT SERPL: 10.2 (ref 7–25)
CALCIUM SPEC-SCNC: 8.9 MG/DL (ref 8.6–10.5)
CHLORIDE SERPL-SCNC: 100 MMOL/L (ref 98–107)
CLARITY UR: CLEAR
CO2 SERPL-SCNC: 20.3 MMOL/L (ref 22–29)
COLOR UR: YELLOW
CREAT SERPL-MCNC: 1.76 MG/DL (ref 0.57–1)
DEPRECATED RDW RBC AUTO: 44.6 FL (ref 37–54)
EGFRCR SERPLBLD CKD-EPI 2021: 28.2 ML/MIN/1.73
EOSINOPHIL # BLD AUTO: 0.07 10*3/MM3 (ref 0–0.4)
EOSINOPHIL NFR BLD AUTO: 0.5 % (ref 0.3–6.2)
ERYTHROCYTE [DISTWIDTH] IN BLOOD BY AUTOMATED COUNT: 14.5 % (ref 12.3–15.4)
GLOBULIN UR ELPH-MCNC: 4.4 GM/DL
GLUCOSE BLDC GLUCOMTR-MCNC: 137 MG/DL (ref 70–130)
GLUCOSE BLDC GLUCOMTR-MCNC: 201 MG/DL (ref 70–130)
GLUCOSE BLDC GLUCOMTR-MCNC: 266 MG/DL (ref 70–130)
GLUCOSE BLDC GLUCOMTR-MCNC: 77 MG/DL (ref 70–130)
GLUCOSE SERPL-MCNC: 188 MG/DL (ref 65–99)
GLUCOSE UR STRIP-MCNC: ABNORMAL MG/DL
HCT VFR BLD AUTO: 34.7 % (ref 34–46.6)
HGB BLD-MCNC: 11.4 G/DL (ref 12–15.9)
HGB UR QL STRIP.AUTO: ABNORMAL
HYALINE CASTS UR QL AUTO: ABNORMAL /LPF
IMM GRANULOCYTES # BLD AUTO: 0.09 10*3/MM3 (ref 0–0.05)
IMM GRANULOCYTES NFR BLD AUTO: 0.7 % (ref 0–0.5)
KETONES UR QL STRIP: NEGATIVE
LEUKOCYTE ESTERASE UR QL STRIP.AUTO: NEGATIVE
LYMPHOCYTES # BLD AUTO: 0.67 10*3/MM3 (ref 0.7–3.1)
LYMPHOCYTES NFR BLD AUTO: 4.9 % (ref 19.6–45.3)
MCH RBC QN AUTO: 27.8 PG (ref 26.6–33)
MCHC RBC AUTO-ENTMCNC: 32.9 G/DL (ref 31.5–35.7)
MCV RBC AUTO: 84.6 FL (ref 79–97)
MONOCYTES # BLD AUTO: 0.56 10*3/MM3 (ref 0.1–0.9)
MONOCYTES NFR BLD AUTO: 4.1 % (ref 5–12)
NEUTROPHILS NFR BLD AUTO: 12.28 10*3/MM3 (ref 1.7–7)
NEUTROPHILS NFR BLD AUTO: 89.3 % (ref 42.7–76)
NITRITE UR QL STRIP: NEGATIVE
NRBC BLD AUTO-RTO: 0 /100 WBC (ref 0–0.2)
PH UR STRIP.AUTO: 6.5 [PH] (ref 5–8)
PLATELET # BLD AUTO: 357 10*3/MM3 (ref 140–450)
PMV BLD AUTO: 9.4 FL (ref 6–12)
POTASSIUM SERPL-SCNC: 3.5 MMOL/L (ref 3.5–5.2)
PROT SERPL-MCNC: 8.4 G/DL (ref 6–8.5)
PROT UR QL STRIP: ABNORMAL
RBC # BLD AUTO: 4.1 10*6/MM3 (ref 3.77–5.28)
RBC # UR STRIP: ABNORMAL /HPF
REF LAB TEST METHOD: ABNORMAL
SODIUM SERPL-SCNC: 138 MMOL/L (ref 136–145)
SP GR UR STRIP: 1.02 (ref 1–1.03)
SQUAMOUS #/AREA URNS HPF: ABNORMAL /HPF
UROBILINOGEN UR QL STRIP: ABNORMAL
WBC # UR STRIP: ABNORMAL /HPF
WBC NRBC COR # BLD AUTO: 13.74 10*3/MM3 (ref 3.4–10.8)

## 2024-07-18 PROCEDURE — 99283 EMERGENCY DEPT VISIT LOW MDM: CPT

## 2024-07-18 PROCEDURE — 80053 COMPREHEN METABOLIC PANEL: CPT | Performed by: PHYSICIAN ASSISTANT

## 2024-07-18 PROCEDURE — 82948 REAGENT STRIP/BLOOD GLUCOSE: CPT

## 2024-07-18 PROCEDURE — 85025 COMPLETE CBC W/AUTO DIFF WBC: CPT | Performed by: PHYSICIAN ASSISTANT

## 2024-07-18 PROCEDURE — 81001 URINALYSIS AUTO W/SCOPE: CPT | Performed by: PHYSICIAN ASSISTANT

## 2024-07-18 PROCEDURE — P9612 CATHETERIZE FOR URINE SPEC: HCPCS

## 2024-07-18 PROCEDURE — 36415 COLL VENOUS BLD VENIPUNCTURE: CPT

## 2024-07-18 RX ORDER — SODIUM CHLORIDE 0.9 % (FLUSH) 0.9 %
10 SYRINGE (ML) INJECTION AS NEEDED
Status: DISCONTINUED | OUTPATIENT
Start: 2024-07-18 | End: 2024-07-19 | Stop reason: HOSPADM

## 2024-07-18 NOTE — ED PROVIDER NOTES
EMERGENCY DEPARTMENT ENCOUNTER    Room Number:  07/07  Date of encounter:  7/18/2024  PCP: Napoleon Mead MD  Historian: Patient, Son  Chronic or social conditions impacting care (social determinants of health): DNR from assisted living    HPI:  Chief Complaint: Altered mental status, hypoglycemia  A complete HPI/ROS/PMH/PSH/SH/FH are unobtainable due to: Nothing    Context: Halie Guidry is a 84 y.o. female with a history of type 2 diabetes, hypertension, chronic kidney disease, who presents to the ED c/o acute altered mental status, hypoglycemia.  EMS was called to the nursing home for reports of altered mental status and unresponsiveness.  Patient's glucose on scene was apparently 35.  The patient was given glucose tablets and orange juice and the patient became more alert.  She reports that she has not eaten breakfast, lunch, or dinner today.  She is on insulin lispro after meals.  She states that she did take her morning dose however has not had any further insulin.  She is also on Toujeo at night.  Patient states she has been urinating frequently however denies any abdominal pain, fever, URI symptoms.    Review of prior external notes (non-ED):   I reviewed oncology office visit from 6/24/2024.  Patient being followed for pancreatic neuroendocrine tumor.    Review of prior external test results outside of this encounter:  Reviewed a renal function panel from 5/15/2024.  Creatinine 2.68, potassium 4.5    PAST MEDICAL HISTORY  Active Ambulatory Problems     Diagnosis Date Noted    Compression fracture 04/22/2009    Lumbar degenerative disc disease 07/05/2012    Type 2 diabetes mellitus with hyperglycemia, with long-term current use of insulin     Hyperlipidemia     Benign essential hypertension 08/20/2014    Primary hypothyroidism     Neuropathy     Osteoporosis 09/09/2015    Proteinuria 02/28/2012    Tobacco abuse 08/22/2013    Generalized weakness 05/27/2017    Spinal stenosis 05/27/2017    Scoliosis  05/27/2017    Arthritis 05/27/2017    VBI (vertebrobasilar insufficiency) 05/28/2017    Orthostatic hypotension 05/28/2017    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 05/28/2017    Severe hypothyroidism 05/30/2017    Noncompliance with medication regimen 05/30/2017    Vitamin D deficiency disease 06/01/2017    Tremor 01/09/2018    Seizure 05/21/2019    Thoracic degenerative disc disease 01/27/2020    Hyperglycemia 12/02/2021    Type II diabetes mellitus, uncontrolled 12/02/2021    Lower abdominal pain 02/23/2022    Transaminitis 02/23/2022    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    Choledocholithiasis 02/23/2022    Hypokalemia 02/24/2022    Hypoxia 02/24/2022    Generalized abdominal pain 03/26/2022    History of Clostridium difficile infection 03/26/2022    History of ERCP 03/26/2022    Hypomagnesemia 03/28/2022    Anxiety disorder 05/27/2022    Neuropathic pain 05/27/2022    Intertrigo 05/27/2022    Weakness of both lower extremities 06/24/2022    Accelerated hypertension 09/01/2022    Acute cystitis without hematuria 09/06/2022    Left lower lobe pneumonia 11/04/2022    Cellulitis and abscess of left lower extremity 05/15/2023    Benign hypertension with CKD (chronic kidney disease) stage IV 06/02/2023    Peripheral edema 07/30/2023    Primary malignant neuroendocrine tumor of pancreas 08/24/2023    Encounter for long-term (current) use of high-risk medication 08/28/2023    Diabetic foot ulcer 12/24/2023    Stage 3a chronic kidney disease 01/28/2024    Pressure injury of buttock, stage 2 01/28/2024    HTN (hypertension) 04/21/2024    Anemia due to stage 3a chronic kidney disease 05/17/2024     Resolved Ambulatory Problems     Diagnosis Date Noted    Leukocytosis     Diabetic eye exam 02/12/2014    Altered mental status 12/15/2017    Stage 3a chronic kidney disease 03/09/2012    Metabolic encephalopathy 12/02/2021    VIPUL (acute kidney injury) 12/02/2021    COVID-19 virus detected 02/23/2022    UTI  (urinary tract infection) 02/23/2022    Acute UTI (urinary tract infection) 03/27/2022    Burning pain 05/27/2022    Acute metabolic encephalopathy 06/22/2022    Hypoglycemia 06/23/2022    Acute metabolic encephalopathy 06/24/2022    Altered mental status, unspecified altered mental status type 11/04/2022    Diarrhea 11/04/2022    Acute pain of left foot 05/13/2023    Bilateral leg pain 07/21/2023    Acute renal insufficiency 01/09/2024    COVID-19 01/10/2024    Cytokine release syndrome, grade 1 01/15/2024    C. difficile diarrhea 01/15/2024    Sepsis 01/27/2024    Metabolic encephalopathy 01/28/2024    Uncontrolled diabetes mellitus with hyperglycemia 04/20/2024    Pseudohyponatremia 04/21/2024     Past Medical History:   Diagnosis Date    Anxiety     Bleeding disorder     Depression     Diabetes     Diabetes mellitus, type 2     Disc degeneration, lumbar     Headache, tension-type     Hypothyroidism     Peripheral neuropathy     Rotator cuff tear, left     Shoulder pain          PAST SURGICAL HISTORY  Past Surgical History:   Procedure Laterality Date    APPENDECTOMY      BILATERAL BREAST REDUCTION Bilateral 08/2015    CATARACT EXTRACTION  03/2015    CHOLECYSTECTOMY WITH INTRAOPERATIVE CHOLANGIOGRAM N/A 3/27/2022    Procedure: CHOLECYSTECTOMY LAPAROSCOPIC INTRAOPERATIVE CHOLANGIOGRAM;  Surgeon: Aiyana Hill MD;  Location: [facility];  Service: General;  Laterality: N/A;    COLONOSCOPY  06/05/2015    WNL    ERCP N/A 2/25/2022    Procedure: ENDOSCOPIC RETROGRADE CHOLANGIOPANCREATOGRAPHY with sphincterotomy and balloon sweep;  Surgeon: Chilo Wilhelm MD;  Location: [facility] ENDOSCOPY;  Service: Gastroenterology;  Laterality: N/A;  PRE/POST - CBD stones    ERCP N/A 3/28/2022    Procedure: ENDOSCOPIC RETROGRADE CHOLANGIOPANCREATOGRAPHY WITH SPHINCTEROTOMY AND BALLOON SWEEP;  Surgeon: Chilo Wilhelm MD;  Location: [facility] ENDOSCOPY;  Service: Gastroenterology;  Laterality: N/A;  PRE: COMMON DUCT  STONE  POST: COMMON DUCT STONE    KYPHOPLASTY      REDUCTION MAMMAPLASTY      TONSILLECTOMY           FAMILY HISTORY  Family History   Problem Relation Age of Onset    Bone cancer Mother     No Known Problems Father     Heart disease Daughter          SOCIAL HISTORY  Social History     Socioeconomic History    Marital status:     Number of children: 3   Tobacco Use    Smoking status: Former     Current packs/day: 0.00     Average packs/day: 1 pack/day for 40.0 years (40.0 ttl pk-yrs)     Types: Cigarettes     Start date:      Quit date:      Years since quittin.5    Smokeless tobacco: Never   Vaping Use    Vaping status: Never Used   Substance and Sexual Activity    Alcohol use: No    Drug use: No    Sexual activity: Defer         ALLERGIES  Sulfa antibiotics        REVIEW OF SYSTEMS  All systems reviewed and negative except for those discussed in HPI.       PHYSICAL EXAM    I have reviewed the triage vital signs and nursing notes.    ED Triage Vitals [24 1743]   Temp Heart Rate Resp BP SpO2   -- 64 18 165/77 97 %      Temp src Heart Rate Source Patient Position BP Location FiO2 (%)   -- -- -- -- --       Physical Exam  GENERAL: Alert, oriented, not distressed  HENT: head atraumatic, no nuchal rigidity  EYES: no scleral icterus, EOMI  CV: regular rhythm, regular rate, no murmur  RESPIRATORY: normal effort, CTA  ABDOMEN: soft, nontender  MUSCULOSKELETAL: no deformity, FROM, no calf swelling or tenderness  NEURO: alert, moves all extremities, follows commands  SKIN: warm, dry        LAB RESULTS  Recent Results (from the past 24 hour(s))   POC Glucose Once    Collection Time: 24  5:48 PM    Specimen: Blood   Result Value Ref Range    Glucose 77 70 - 130 mg/dL   CBC Auto Differential    Collection Time: 24  6:07 PM    Specimen: Arm, Left; Blood   Result Value Ref Range    WBC 13.74 (H) 3.40 - 10.80 10*3/mm3    RBC 4.10 3.77 - 5.28 10*6/mm3    Hemoglobin 11.4 (L) 12.0 - 15.9 g/dL     Hematocrit 34.7 34.0 - 46.6 %    MCV 84.6 79.0 - 97.0 fL    MCH 27.8 26.6 - 33.0 pg    MCHC 32.9 31.5 - 35.7 g/dL    RDW 14.5 12.3 - 15.4 %    RDW-SD 44.6 37.0 - 54.0 fl    MPV 9.4 6.0 - 12.0 fL    Platelets 357 140 - 450 10*3/mm3    Neutrophil % 89.3 (H) 42.7 - 76.0 %    Lymphocyte % 4.9 (L) 19.6 - 45.3 %    Monocyte % 4.1 (L) 5.0 - 12.0 %    Eosinophil % 0.5 0.3 - 6.2 %    Basophil % 0.5 0.0 - 1.5 %    Immature Grans % 0.7 (H) 0.0 - 0.5 %    Neutrophils, Absolute 12.28 (H) 1.70 - 7.00 10*3/mm3    Lymphocytes, Absolute 0.67 (L) 0.70 - 3.10 10*3/mm3    Monocytes, Absolute 0.56 0.10 - 0.90 10*3/mm3    Eosinophils, Absolute 0.07 0.00 - 0.40 10*3/mm3    Basophils, Absolute 0.07 0.00 - 0.20 10*3/mm3    Immature Grans, Absolute 0.09 (H) 0.00 - 0.05 10*3/mm3    nRBC 0.0 0.0 - 0.2 /100 WBC   Urinalysis With Microscopic If Indicated (No Culture) - Straight Cath    Collection Time: 07/18/24  6:43 PM    Specimen: Straight Cath; Urine   Result Value Ref Range    Color, UA Yellow Yellow, Straw    Appearance, UA Clear Clear    pH, UA 6.5 5.0 - 8.0    Specific Gravity, UA 1.020 1.005 - 1.030    Glucose,  mg/dL (Trace) (A) Negative    Ketones, UA Negative Negative    Bilirubin, UA Negative Negative    Blood, UA Trace (A) Negative    Protein, UA >=300 mg/dL (3+) (A) Negative    Leuk Esterase, UA Negative Negative    Nitrite, UA Negative Negative    Urobilinogen, UA 0.2 E.U./dL 0.2 - 1.0 E.U./dL   Urinalysis, Microscopic Only - Straight Cath    Collection Time: 07/18/24  6:43 PM    Specimen: Straight Cath; Urine   Result Value Ref Range    RBC, UA 0-2 None Seen, 0-2 /HPF    WBC, UA 3-5 (A) None Seen, 0-2 /HPF    Bacteria, UA None Seen None Seen /HPF    Squamous Epithelial Cells, UA 0-2 None Seen, 0-2 /HPF    Hyaline Casts, UA 0-2 None Seen /LPF    Methodology Automated Microscopy    POC Glucose Once    Collection Time: 07/18/24  7:04 PM    Specimen: Blood   Result Value Ref Range    Glucose 137 (H) 70 - 130 mg/dL    Comprehensive Metabolic Panel    Collection Time: 07/18/24  7:37 PM    Specimen: Arm, Left; Blood   Result Value Ref Range    Glucose 188 (H) 65 - 99 mg/dL    BUN 18 8 - 23 mg/dL    Creatinine 1.76 (H) 0.57 - 1.00 mg/dL    Sodium 138 136 - 145 mmol/L    Potassium 3.5 3.5 - 5.2 mmol/L    Chloride 100 98 - 107 mmol/L    CO2 20.3 (L) 22.0 - 29.0 mmol/L    Calcium 8.9 8.6 - 10.5 mg/dL    Total Protein 8.4 6.0 - 8.5 g/dL    Albumin 4.0 3.5 - 5.2 g/dL    ALT (SGPT) 5 1 - 33 U/L    AST (SGOT) 12 1 - 32 U/L    Alkaline Phosphatase 155 (H) 39 - 117 U/L    Total Bilirubin 0.3 0.0 - 1.2 mg/dL    Globulin 4.4 gm/dL    A/G Ratio 0.9 g/dL    BUN/Creatinine Ratio 10.2 7.0 - 25.0    Anion Gap 17.7 (H) 5.0 - 15.0 mmol/L    eGFR 28.2 (L) >60.0 mL/min/1.73   POC Glucose Once    Collection Time: 07/18/24  8:24 PM    Specimen: Blood   Result Value Ref Range    Glucose 201 (H) 70 - 130 mg/dL   POC Glucose Once    Collection Time: 07/18/24  9:15 PM    Specimen: Blood   Result Value Ref Range    Glucose 266 (H) 70 - 130 mg/dL       Ordered the above labs and independently reviewed the results.    MEDICATIONS GIVEN IN ER    Medications   sodium chloride 0.9 % flush 10 mL (has no administration in time range)         ADDITIONAL ORDERS CONSIDERED BUT NOT ORDERED:  Admission was considered but after careful review of the patient's presentation, physical examination, diagnostic results, and response to treatment the patient may be safely discharged with outpatient follow-up.       PROGRESS, DATA ANALYSIS, CONSULTS, AND MEDICAL DECISION MAKING    All labs have been independently interpreted by myself.  All radiology studies have been independently interpreted by myself and discussed with radiologist dictating the report.   EKG's independently interpreted by myself.  Discussion below represents my analysis of pertinent findings related to patient's condition, differential diagnosis, treatment plan and final disposition.    I have discussed case  with Dr. Ramos, emergency room physician.  He has performed his own bedside examination and agrees with treatment plan.    ED Course as of 07/18/24 2140   Thu Jul 18, 2024   1800 Patient presents with altered mental status, hypoglycemia.  At this time patient is alert and oriented.  Patient is a type II diabetic on insulin.  She apparently did not eat any meals today after taking her morning insulin.  Blood sugar initially was 35, after glucose tablets and orange juice blood sugar is now 77.  Will check basic labs, urine. [EE]   1909 Glucose(!): 137 [EE]   1912 Bacteria, UA: None Seen [EE]   2037 Glucose(!): 201 [EE]   2116 Blood sugar no further episodes of hypoglycemia.  No evidence of underlying infection.  We will discharge. [EE]   2118 Glucose(!): 266 [EE]      ED Course User Index  [EE] Lit Cheney PA       AS OF 21:40 EDT VITALS:    BP - 169/66  HR - 61  TEMP - 96.3 °F (35.7 °C) (Tympanic)  O2 SATS - 95%        DIAGNOSIS  Final diagnoses:   Hypoglycemia associated with diabetes         DISPOSITION  Discharged    Admission was considered but after careful review of the patient's presentation, physical examination, diagnostic results, and response to treatment the patient may be safely discharged with outpatient follow-up.         Dictated utilizing Dragon dictation     Lit Cheney PA  07/18/24 2140

## 2024-07-18 NOTE — ED NOTES
Patient arrives via [facility] from [facility] for complaints of hypoglycemia. Staff reports finding the patient unresponsive, patient's initial BG was 50. EMS states that the facility gave the patient three glucose tablets and orange juice. EMS states BG was 35 when they arrived, at this time it is 70. Patient is alert and oriented x4.

## 2024-07-18 NOTE — ED PROVIDER NOTES
EMERGENCY DEPARTMENT MD ATTESTATION NOTE    Room Number:  07/07  PCP: Napoleon Mead MD  Independent Historians: Patient, Family, and EMS    HPI:  A complete HPI/ROS/PMH/PSH/SH/FH are unobtainable due to: None    Chronic or social conditions impacting patient care (Social Determinants of Health): None      Context: Halie Guidry is a 84 y.o. female with a medical history of diabetes, spinal stenosis, hyperlipidemia who presents to the ED c/o acute hypoglycemia.  EMS reports the patient's blood sugar was 35 when they arrived.  EMS reports that the patient was found unresponsive and her sugar was 50.  They gave the patient 3 glucose tablets and orange juice and called EMS.  Family reports that she did not eat her breakfast or her lunch today.  She took her insulin this morning.  She last took her long-acting insulin last night.  She denies any pain.  She denies nausea or vomiting.  She normally wears a Dexcom but it was leading at the site where she was trying to place it so she did not use it.        Review of prior external notes (non-ED) -and- Review of prior external test results outside of this encounter:  Laboratory evaluation 6/24/2024 shows normal CBC except for hemoglobin 10.7    Prescription drug monitoring program review:           PHYSICAL EXAM    I have reviewed the triage vital signs and nursing notes.    ED Triage Vitals [07/18/24 1743]   Temp Heart Rate Resp BP SpO2   -- 64 18 165/77 97 %      Temp src Heart Rate Source Patient Position BP Location FiO2 (%)   -- -- -- -- --       Physical Exam  GENERAL: Awake, alert, no acute distress  SKIN: Warm, dry  HENT: Normocephalic, atraumatic  EYES: no scleral icterus  CV: regular rhythm, regular rate  RESPIRATORY: normal effort, lungs clear  ABDOMEN: soft, nontender, nondistended  MUSCULOSKELETAL: no deformity  NEURO: alert, moves all extremities, follows commands            MEDICATIONS GIVEN IN ER  Medications   sodium chloride 0.9 % flush 10 mL (has  no administration in time range)         ORDERS PLACED DURING THIS VISIT:  Orders Placed This Encounter   Procedures    Comprehensive Metabolic Panel    CBC Auto Differential    Urinalysis With Microscopic If Indicated (No Culture) - Urine, Catheter    Urinalysis, Microscopic Only - Urine, Clean Catch    POC Glucose Once    POC Glucose Once    POC Glucose Once    POC Glucose Once    Insert Peripheral IV    CBC & Differential         PROCEDURES  Procedures            PROGRESS, DATA ANALYSIS, CONSULTS, AND MEDICAL DECISION MAKING  All labs have been independently interpreted by me.  All radiology studies have been reviewed by me. All EKG's have been independently viewed and interpreted by me.  Discussion below represents my analysis of pertinent findings related to patient's condition, differential diagnosis, treatment plan and final disposition.    Differential diagnosis includes but is not limited to renal failure, hypoglycemia, insulin overdose.    Clinical Scores:                   ED Course as of 07/18/24 2301   Thu Jul 18, 2024   1800 Patient presents with altered mental status, hypoglycemia.  At this time patient is alert and oriented.  Patient is a type II diabetic on insulin.  She apparently did not eat any meals today after taking her morning insulin.  Blood sugar initially was 35, after glucose tablets and orange juice blood sugar is now 77.  Will check basic labs, urine. [EE]   1909 Glucose(!): 137 [EE]   1912 Bacteria, UA: None Seen [EE]   2037 Glucose(!): 201 [EE]   2116 Blood sugar no further episodes of hypoglycemia.  No evidence of underlying infection.  We will discharge. [EE]   2118 Glucose(!): 266 [EE]      ED Course User Index  [EE] Lit Cheney PA       MDM: Plan to check chemistries and blood counts.  The patient does report some frequent urination so we will also check a urinalysis.  We will monitor her blood sugar.  We will get her food to eat.      COMPLEXITY OF CARE  Admission was  considered but after careful review of the patient's presentation, physical examination, diagnostic results, and response to treatment the patient may be safely discharged with outpatient follow-up.    Please note that portions of this document were completed with a voice recognition program.    Note Disclaimer: At [facility], we believe that sharing information builds trust and better relationships. You are receiving this note because you recently visited [facility]. It is possible you will see health information before a provider has talked with you about it. This kind of information can be easy to misunderstand. To help you fully understand what it means for your health, we urge you to discuss this note with your provider.         Woody Ramos MD  07/18/24 1596

## 2024-07-22 ENCOUNTER — LAB (OUTPATIENT)
Dept: LAB | Facility: HOSPITAL | Age: 84
End: 2024-07-22
Payer: MEDICARE

## 2024-07-22 ENCOUNTER — INFUSION (OUTPATIENT)
Dept: ONCOLOGY | Facility: HOSPITAL | Age: 84
End: 2024-07-22
Payer: MEDICARE

## 2024-07-22 DIAGNOSIS — C7A.8 PRIMARY MALIGNANT NEUROENDOCRINE TUMOR OF PANCREAS: ICD-10-CM

## 2024-07-22 DIAGNOSIS — Z79.899 ENCOUNTER FOR LONG-TERM (CURRENT) USE OF HIGH-RISK MEDICATION: ICD-10-CM

## 2024-07-22 DIAGNOSIS — Z79.899 ENCOUNTER FOR LONG-TERM (CURRENT) USE OF HIGH-RISK MEDICATION: Primary | ICD-10-CM

## 2024-07-22 LAB
BASOPHILS # BLD AUTO: 0.09 10*3/MM3 (ref 0–0.2)
BASOPHILS NFR BLD AUTO: 0.8 % (ref 0–1.5)
DEPRECATED RDW RBC AUTO: 50.3 FL (ref 37–54)
EOSINOPHIL # BLD AUTO: 0.5 10*3/MM3 (ref 0–0.4)
EOSINOPHIL NFR BLD AUTO: 4.6 % (ref 0.3–6.2)
ERYTHROCYTE [DISTWIDTH] IN BLOOD BY AUTOMATED COUNT: 15.6 % (ref 12.3–15.4)
HCT VFR BLD AUTO: 31.9 % (ref 34–46.6)
HGB BLD-MCNC: 10.1 G/DL (ref 12–15.9)
IMM GRANULOCYTES # BLD AUTO: 0.08 10*3/MM3 (ref 0–0.05)
IMM GRANULOCYTES NFR BLD AUTO: 0.7 % (ref 0–0.5)
LYMPHOCYTES # BLD AUTO: 1.04 10*3/MM3 (ref 0.7–3.1)
LYMPHOCYTES NFR BLD AUTO: 9.6 % (ref 19.6–45.3)
MCH RBC QN AUTO: 28.1 PG (ref 26.6–33)
MCHC RBC AUTO-ENTMCNC: 31.7 G/DL (ref 31.5–35.7)
MCV RBC AUTO: 88.9 FL (ref 79–97)
MONOCYTES # BLD AUTO: 0.7 10*3/MM3 (ref 0.1–0.9)
MONOCYTES NFR BLD AUTO: 6.4 % (ref 5–12)
NEUTROPHILS NFR BLD AUTO: 77.9 % (ref 42.7–76)
NEUTROPHILS NFR BLD AUTO: 8.46 10*3/MM3 (ref 1.7–7)
NRBC BLD AUTO-RTO: 0 /100 WBC (ref 0–0.2)
PLATELET # BLD AUTO: 376 10*3/MM3 (ref 140–450)
PMV BLD AUTO: 9.7 FL (ref 6–12)
RBC # BLD AUTO: 3.59 10*6/MM3 (ref 3.77–5.28)
WBC NRBC COR # BLD AUTO: 10.87 10*3/MM3 (ref 3.4–10.8)

## 2024-07-22 PROCEDURE — 85025 COMPLETE CBC W/AUTO DIFF WBC: CPT

## 2024-07-22 PROCEDURE — 25010000002 OCTREOTIDE PER 1 MG: Performed by: NURSE PRACTITIONER

## 2024-07-22 PROCEDURE — 36415 COLL VENOUS BLD VENIPUNCTURE: CPT

## 2024-07-22 PROCEDURE — 96372 THER/PROPH/DIAG INJ SC/IM: CPT

## 2024-07-22 RX ADMIN — OCTREOTIDE ACETATE 30 MG: KIT at 14:54

## 2024-07-23 ENCOUNTER — TELEPHONE (OUTPATIENT)
Dept: FAMILY MEDICINE CLINIC | Facility: CLINIC | Age: 84
End: 2024-07-23

## 2024-07-23 DIAGNOSIS — Z79.4 TYPE 2 DIABETES MELLITUS WITH HYPERGLYCEMIA, WITH LONG-TERM CURRENT USE OF INSULIN: ICD-10-CM

## 2024-07-23 DIAGNOSIS — E11.65 TYPE 2 DIABETES MELLITUS WITH HYPERGLYCEMIA, WITH LONG-TERM CURRENT USE OF INSULIN: ICD-10-CM

## 2024-07-23 DIAGNOSIS — E03.9 PRIMARY HYPOTHYROIDISM: ICD-10-CM

## 2024-07-23 RX ORDER — LEVOTHYROXINE SODIUM 112 UG/1
112 TABLET ORAL
Qty: 90 TABLET | Refills: 0 | Status: SHIPPED | OUTPATIENT
Start: 2024-07-23

## 2024-07-23 RX ORDER — INSULIN LISPRO 100 [IU]/ML
12-17 INJECTION, SOLUTION INTRAVENOUS; SUBCUTANEOUS
Qty: 15 ML | Refills: 2 | Status: SHIPPED | OUTPATIENT
Start: 2024-07-23

## 2024-07-23 NOTE — TELEPHONE ENCOUNTER
Incoming Refill Request      Medication requested (name and dose): [facility]    Pharmacy where request should be sent: [facility] [place]    Additional details provided by patient: PATIENT IS REQUESTING PENS     Best call back number: [phone]    Does the patient have less than a 3 day supply:  [] Yes  [] No    Jaylen Choi Rep  07/23/24, 11:37 EDT

## 2024-07-23 NOTE — TELEPHONE ENCOUNTER
Incoming Refill Request      Medication requested (name and dose): L-THYROXINE 112    Pharmacy where request should be sent: [facility]    Additional details provided by patient:     Best call back number: [phone]    Does the patient have less than a 3 day supply:  [] Yes  [] No    Jaylen Choi Rep  07/23/24, 11:01 EDT

## 2024-07-23 NOTE — TELEPHONE ENCOUNTER
Rx Refill Note  Requested Prescriptions     Pending Prescriptions Disp Refills    Insulin Lispro, 1 Unit Dial, (HumaLOG KwikPen) 100 UNIT/ML solution pen-injector 15 mL 3     Sig: Inject 12-17 Units under the skin into the appropriate area as directed 3 (Three) Times a Day Before Meals.      Last office visit with prescribing clinician: 6/10/2024   Last telemedicine visit with prescribing clinician: Visit date not found   Next office visit with prescribing clinician: 9/10/2024                         Would you like a call back once the refill request has been completed: [] Yes [] No    If the office needs to give you a call back, can they leave a voicemail: [] Yes [] No    Grecia Jenkins  07/23/24, 11:39 EDT

## 2024-07-23 NOTE — TELEPHONE ENCOUNTER
Rx Refill Note  Requested Prescriptions     Pending Prescriptions Disp Refills    levothyroxine (SYNTHROID, LEVOTHROID) 112 MCG tablet 96 tablet 0     Sig: levothyroxine 112 mcg 1 tablet daily, except on Sunday take 1.5 tablets.      Last office visit with prescribing clinician: 6/10/2024   Last telemedicine visit with prescribing clinician: Visit date not found   Next office visit with prescribing clinician: 9/10/2024                         Would you like a call back once the refill request has been completed: [] Yes [] No    If the office needs to give you a call back, can they leave a voicemail: [] Yes [] No    Grecia Jenkins  07/23/24, 11:08 EDT

## 2024-07-23 NOTE — TELEPHONE ENCOUNTER
Caller: Halie Guidry    Relationship: Self    Best call back number:     [phone] (Mobile)       What orders are you requesting (i.e. lab or imaging): MOTORIZED WHEELCHAIR AND PHYSICAL THERAPY.    In what timeframe would the patient need to come in: ASA    Where will you receive your lab/imaging services: [facility] AND THE [facility].     Additional notes: PLEASE CONTACT PATIENT TO ADVISE.          THANKS

## 2024-07-29 NOTE — PROGRESS NOTES
Subjective   Halie Guidry is a 84 y.o. female.     CC: VV for Mobility Discussion    History of Present Illness     Patient seen today on a video visit to discuss chronic issues with mobility, and options thereof. Pt has chronic edema in the legs and a left rotator cuff tear and lives a long way from the dining room in the Assisted Living facility of which she lives.         The following portions of the patient's history were reviewed and updated as appropriate: allergies, current medications, past family history, past medical history, past social history, past surgical history, and problem list.    Review of Systems   Constitutional:  Negative for activity change, chills and fever.   Respiratory:  Negative for cough.    Cardiovascular:  Negative for chest pain.   Psychiatric/Behavioral:  Negative for dysphoric mood.        Objective   Physical Exam  Constitutional:       General: She is not in acute distress.     Appearance: She is well-developed.   Pulmonary:      Effort: Pulmonary effort is normal.   Neurological:      Mental Status: She is alert and oriented to person, place, and time.   Psychiatric:         Behavior: Behavior normal.         Thought Content: Thought content normal.         Assessment & Plan   Diagnoses and all orders for this visit:    1. Ataxia (Primary)  -     Cancel: Ambulatory Referral to Chronic Care Management Care Coordination  -     Ambulatory Referral to Physical Therapy for Evaluation & Treatment  -     Ambulatory Referral to Chronic Care Management Care Coordination    2. Weakness  -     Cancel: Ambulatory Referral to Chronic Care Management Care Coordination  -     Ambulatory Referral to Physical Therapy for Evaluation & Treatment  -     Ambulatory Referral to Chronic Care Management Care Coordination    3. Tear of left rotator cuff, unspecified tear extent, unspecified whether traumatic  -     Ambulatory Referral to Chronic Care Management Care Coordination    4. Type 2  diabetes mellitus with hyperglycemia, with long-term current use of insulin  -     Cancel: Ambulatory Referral to Chronic Care Management Care Coordination  -     Ambulatory Referral to Chronic Care Management Care Coordination    This patient is unable to safely use a cane, walker, or manual wheelchair due to ataxia and weakness.  Due to this and mobility related to these mentioned diagnoses, a power wheelchair is needed to assist with daily living activities inside her residence.  Patient has the physical ability and the mental capacities to control the power wheelchair inside her residence.      Spent  14   minutes with chart and interview and consent for this encounter given by the patient.  You have chosen to receive care through a telehealth visit.  Do you consent to use a video/audio connection for your medical care today? Yes  Patient was in their residence when this visit took place and I was in my medical office.

## 2024-07-30 ENCOUNTER — TELEMEDICINE (OUTPATIENT)
Dept: FAMILY MEDICINE CLINIC | Facility: CLINIC | Age: 84
End: 2024-07-30
Payer: MEDICARE

## 2024-07-30 DIAGNOSIS — Z79.4 TYPE 2 DIABETES MELLITUS WITH HYPERGLYCEMIA, WITH LONG-TERM CURRENT USE OF INSULIN: ICD-10-CM

## 2024-07-30 DIAGNOSIS — E11.65 TYPE 2 DIABETES MELLITUS WITH HYPERGLYCEMIA, WITH LONG-TERM CURRENT USE OF INSULIN: ICD-10-CM

## 2024-07-30 DIAGNOSIS — M75.102 TEAR OF LEFT ROTATOR CUFF, UNSPECIFIED TEAR EXTENT, UNSPECIFIED WHETHER TRAUMATIC: ICD-10-CM

## 2024-07-30 DIAGNOSIS — R27.0 ATAXIA: Primary | ICD-10-CM

## 2024-07-30 DIAGNOSIS — R53.1 WEAKNESS: ICD-10-CM

## 2024-07-30 PROCEDURE — 99213 OFFICE O/P EST LOW 20 MIN: CPT | Performed by: FAMILY MEDICINE

## 2024-07-30 PROCEDURE — 1125F AMNT PAIN NOTED PAIN PRSNT: CPT | Performed by: FAMILY MEDICINE

## 2024-07-31 ENCOUNTER — REFERRAL TRIAGE (OUTPATIENT)
Dept: CASE MANAGEMENT | Facility: OTHER | Age: 84
End: 2024-07-31
Payer: MEDICARE

## 2024-07-31 ENCOUNTER — TELEPHONE (OUTPATIENT)
Dept: ENDOCRINOLOGY | Age: 84
End: 2024-07-31
Payer: MEDICARE

## 2024-07-31 NOTE — TELEPHONE ENCOUNTER
[facility]

## 2024-08-01 ENCOUNTER — TELEPHONE (OUTPATIENT)
Dept: CASE MANAGEMENT | Facility: OTHER | Age: 84
End: 2024-08-01
Payer: MEDICARE

## 2024-08-01 DIAGNOSIS — S49.90XS SHOULDER INJURY, UNSPECIFIED LATERALITY, SEQUELA: ICD-10-CM

## 2024-08-01 DIAGNOSIS — M51.36 LUMBAR DEGENERATIVE DISC DISEASE: ICD-10-CM

## 2024-08-01 DIAGNOSIS — E13.43: ICD-10-CM

## 2024-08-01 DIAGNOSIS — R53.1 GENERALIZED WEAKNESS: ICD-10-CM

## 2024-08-01 DIAGNOSIS — M48.061 SPINAL STENOSIS OF LUMBAR REGION, UNSPECIFIED WHETHER NEUROGENIC CLAUDICATION PRESENT: Primary | ICD-10-CM

## 2024-08-01 DIAGNOSIS — M51.34 THORACIC DEGENERATIVE DISC DISEASE: ICD-10-CM

## 2024-08-05 ENCOUNTER — TELEPHONE (OUTPATIENT)
Dept: CASE MANAGEMENT | Facility: OTHER | Age: 84
End: 2024-08-05
Payer: MEDICARE

## 2024-08-06 ENCOUNTER — PATIENT OUTREACH (OUTPATIENT)
Dept: CASE MANAGEMENT | Facility: OTHER | Age: 84
End: 2024-08-06
Payer: MEDICARE

## 2024-08-06 DIAGNOSIS — M48.061 SPINAL STENOSIS OF LUMBAR REGION, UNSPECIFIED WHETHER NEUROGENIC CLAUDICATION PRESENT: ICD-10-CM

## 2024-08-06 DIAGNOSIS — E13.43: ICD-10-CM

## 2024-08-06 DIAGNOSIS — M51.36 LUMBAR DEGENERATIVE DISC DISEASE: Primary | ICD-10-CM

## 2024-08-06 DIAGNOSIS — G62.9 NEUROPATHY: ICD-10-CM

## 2024-08-06 DIAGNOSIS — M51.34 THORACIC DEGENERATIVE DISC DISEASE: ICD-10-CM

## 2024-08-06 DIAGNOSIS — R53.1 GENERALIZED WEAKNESS: ICD-10-CM

## 2024-08-06 DIAGNOSIS — S49.90XS SHOULDER INJURY, UNSPECIFIED LATERALITY, SEQUELA: ICD-10-CM

## 2024-08-06 NOTE — OUTREACH NOTE
AMBULATORY CASE MANAGEMENT NOTE    Names and Relationships of Patient/Support Persons: Contact: Halie Guidry; Relationship: Self -     Adult Patient Profile  Questions/Answers      Flowsheet Row Most Recent Value   Symptoms/Conditions Managed at Home diabetes, type 2, musculoskeletal   Barriers to Managing Health understanding health advice, stress of chronic illness   Diabetes Management Strategies routine screenings, diet modification, blood glucose testing   A1C Target less than 6   Usual Blood Glucose 200's   Last A1C Result 14.2 (4/20/2024)   Diabetes Self-Management Outcome 3 (uncertain)   Diabetes Comment Barriers to chronic disease control/ disease literacy   Musculoskeletal Symptoms/Conditions joint pain, back pain, frailty syndrome, mobility limited, osteoarthritis, unsteady gait   Musculoskeletal Management Strategies routine screening, exercise   Musculoskeletal Self-Management Outcome 3 (uncertain)   Musculoskeletal Comment requesting therapy at her assisted living/ order sent/ order for [employer] W/C sent        CCM Interim Update    Return call from patient, regarding ACM assistance.    Introduced self and purpose for call.  Agreeable to ACM assistance.    States that she is requesting PT from her assisted living location.  Provided ACM with fax number to [facility].  We will reach out to them regarding order.    Made patient aware that referral for DME power wheelchair has been sent to [facility].      No further needs at this time.  Next outreach has been scheduled.  - check on wheelchair or outreach from [place]  - check on PT  - check on pain    Patient is monitored by Endocrinology, offer dietary assistance.  Discuss lowering A1C from 14.2 to less than 6    Education Documentation  No documentation found.        Carol THORNTON  Ambulatory Case Management    8/6/2024, 14:09 EDT

## 2024-08-08 ENCOUNTER — TELEPHONE (OUTPATIENT)
Dept: FAMILY MEDICINE CLINIC | Facility: CLINIC | Age: 84
End: 2024-08-08
Payer: MEDICARE

## 2024-08-08 DIAGNOSIS — M51.36 DEGENERATIVE DISC DISEASE, LUMBAR: ICD-10-CM

## 2024-08-08 DIAGNOSIS — R53.1 WEAKNESS: Primary | ICD-10-CM

## 2024-08-08 NOTE — TELEPHONE ENCOUNTER
PT ORDERS FAXED TO [facility] IN [place]   ATTN LUCERO GOMEZ   FAX  [phone]   CONFIRMATION

## 2024-08-08 NOTE — TELEPHONE ENCOUNTER
Caller: Derrick Guidry ([facility])    Relationship: Emergency Contact    Best call back number: [phone]    What orders are you requesting (i.e. lab or imaging): PT    In what timeframe would the patient need to come in: ASAP    Where will you receive your lab/imaging services: [facility] [place] FAX #[phone], LUCERO GOMEZ IS THE PT NAME.

## 2024-08-14 ENCOUNTER — TELEPHONE (OUTPATIENT)
Dept: FAMILY MEDICINE CLINIC | Facility: CLINIC | Age: 84
End: 2024-08-14
Payer: MEDICARE

## 2024-08-16 ENCOUNTER — TELEPHONE (OUTPATIENT)
Dept: ENDOCRINOLOGY | Age: 84
End: 2024-08-16
Payer: MEDICARE

## 2024-08-19 ENCOUNTER — LAB (OUTPATIENT)
Dept: LAB | Facility: HOSPITAL | Age: 84
End: 2024-08-19
Payer: MEDICARE

## 2024-08-19 ENCOUNTER — INFUSION (OUTPATIENT)
Dept: ONCOLOGY | Facility: HOSPITAL | Age: 84
End: 2024-08-19
Payer: MEDICARE

## 2024-08-19 DIAGNOSIS — C7A.8 PRIMARY MALIGNANT NEUROENDOCRINE TUMOR OF PANCREAS: ICD-10-CM

## 2024-08-19 DIAGNOSIS — Z79.899 ENCOUNTER FOR LONG-TERM (CURRENT) USE OF HIGH-RISK MEDICATION: ICD-10-CM

## 2024-08-19 DIAGNOSIS — Z79.899 ENCOUNTER FOR LONG-TERM (CURRENT) USE OF HIGH-RISK MEDICATION: Primary | ICD-10-CM

## 2024-08-19 LAB
BASOPHILS # BLD AUTO: 0.12 10*3/MM3 (ref 0–0.2)
BASOPHILS NFR BLD AUTO: 1 % (ref 0–1.5)
DEPRECATED RDW RBC AUTO: 49 FL (ref 37–54)
EOSINOPHIL # BLD AUTO: 0.48 10*3/MM3 (ref 0–0.4)
EOSINOPHIL NFR BLD AUTO: 3.8 % (ref 0.3–6.2)
ERYTHROCYTE [DISTWIDTH] IN BLOOD BY AUTOMATED COUNT: 15.3 % (ref 12.3–15.4)
HCT VFR BLD AUTO: 31.9 % (ref 34–46.6)
HGB BLD-MCNC: 9.8 G/DL (ref 12–15.9)
IMM GRANULOCYTES # BLD AUTO: 0.04 10*3/MM3 (ref 0–0.05)
IMM GRANULOCYTES NFR BLD AUTO: 0.3 % (ref 0–0.5)
LYMPHOCYTES # BLD AUTO: 1.39 10*3/MM3 (ref 0.7–3.1)
LYMPHOCYTES NFR BLD AUTO: 11.1 % (ref 19.6–45.3)
MCH RBC QN AUTO: 26.8 PG (ref 26.6–33)
MCHC RBC AUTO-ENTMCNC: 30.7 G/DL (ref 31.5–35.7)
MCV RBC AUTO: 87.4 FL (ref 79–97)
MONOCYTES # BLD AUTO: 1.06 10*3/MM3 (ref 0.1–0.9)
MONOCYTES NFR BLD AUTO: 8.5 % (ref 5–12)
NEUTROPHILS NFR BLD AUTO: 75.3 % (ref 42.7–76)
NEUTROPHILS NFR BLD AUTO: 9.4 10*3/MM3 (ref 1.7–7)
NRBC BLD AUTO-RTO: 0 /100 WBC (ref 0–0.2)
PLATELET # BLD AUTO: 334 10*3/MM3 (ref 140–450)
PMV BLD AUTO: 9.7 FL (ref 6–12)
RBC # BLD AUTO: 3.65 10*6/MM3 (ref 3.77–5.28)
WBC NRBC COR # BLD AUTO: 12.49 10*3/MM3 (ref 3.4–10.8)

## 2024-08-19 PROCEDURE — 25010000002 OCTREOTIDE PER 1 MG: Performed by: INTERNAL MEDICINE

## 2024-08-19 PROCEDURE — 85025 COMPLETE CBC W/AUTO DIFF WBC: CPT

## 2024-08-19 PROCEDURE — 96372 THER/PROPH/DIAG INJ SC/IM: CPT

## 2024-08-19 PROCEDURE — 36415 COLL VENOUS BLD VENIPUNCTURE: CPT

## 2024-08-19 RX ADMIN — OCTREOTIDE ACETATE 30 MG: KIT at 14:26

## 2024-08-24 ENCOUNTER — HOSPITAL ENCOUNTER (INPATIENT)
Facility: HOSPITAL | Age: 84
LOS: 10 days | Discharge: NURSING, REHAB OR LONG-TERM CARE | End: 2024-09-04
Attending: EMERGENCY MEDICINE | Admitting: INTERNAL MEDICINE
Payer: MEDICARE

## 2024-08-24 DIAGNOSIS — L03.119 CELLULITIS OF LOWER EXTREMITY, UNSPECIFIED LATERALITY: Primary | ICD-10-CM

## 2024-08-24 DIAGNOSIS — Z86.39 HISTORY OF DIABETES MELLITUS: ICD-10-CM

## 2024-08-24 DIAGNOSIS — R60.0 BILATERAL LOWER EXTREMITY EDEMA: ICD-10-CM

## 2024-08-24 DIAGNOSIS — M79.2 NEUROPATHIC PAIN: ICD-10-CM

## 2024-08-24 DIAGNOSIS — N18.9 CHRONIC KIDNEY DISEASE, UNSPECIFIED CKD STAGE: ICD-10-CM

## 2024-08-24 PROBLEM — N18.4 CKD (CHRONIC KIDNEY DISEASE) STAGE 4, GFR 15-29 ML/MIN: Status: ACTIVE | Noted: 2024-08-24

## 2024-08-24 PROBLEM — L03.115 CELLULITIS OF RIGHT LOWER EXTREMITY: Status: ACTIVE | Noted: 2024-08-24

## 2024-08-24 PROBLEM — I50.9 ACUTE CHF: Status: ACTIVE | Noted: 2024-08-24

## 2024-08-24 LAB
ANION GAP SERPL CALCULATED.3IONS-SCNC: 12 MMOL/L (ref 5–15)
BASOPHILS # BLD AUTO: 0.1 10*3/MM3 (ref 0–0.2)
BASOPHILS NFR BLD AUTO: 1 % (ref 0–1.5)
BUN SERPL-MCNC: 20 MG/DL (ref 8–23)
BUN/CREAT SERPL: 11.2 (ref 7–25)
CALCIUM SPEC-SCNC: 8.8 MG/DL (ref 8.6–10.5)
CHLORIDE SERPL-SCNC: 104 MMOL/L (ref 98–107)
CO2 SERPL-SCNC: 24 MMOL/L (ref 22–29)
CREAT SERPL-MCNC: 1.78 MG/DL (ref 0.57–1)
D-LACTATE SERPL-SCNC: 1.2 MMOL/L (ref 0.5–2)
DEPRECATED RDW RBC AUTO: 44.8 FL (ref 37–54)
EGFRCR SERPLBLD CKD-EPI 2021: 27.9 ML/MIN/1.73
EOSINOPHIL # BLD AUTO: 0.25 10*3/MM3 (ref 0–0.4)
EOSINOPHIL NFR BLD AUTO: 2.4 % (ref 0.3–6.2)
ERYTHROCYTE [DISTWIDTH] IN BLOOD BY AUTOMATED COUNT: 14.6 % (ref 12.3–15.4)
GLUCOSE BLDC GLUCOMTR-MCNC: 333 MG/DL (ref 70–130)
GLUCOSE SERPL-MCNC: 261 MG/DL (ref 65–99)
HBA1C MFR BLD: 7.9 % (ref 4.8–5.6)
HCT VFR BLD AUTO: 31 % (ref 34–46.6)
HGB BLD-MCNC: 10.2 G/DL (ref 12–15.9)
IMM GRANULOCYTES # BLD AUTO: 0.06 10*3/MM3 (ref 0–0.05)
IMM GRANULOCYTES NFR BLD AUTO: 0.6 % (ref 0–0.5)
LYMPHOCYTES # BLD AUTO: 1.2 10*3/MM3 (ref 0.7–3.1)
LYMPHOCYTES NFR BLD AUTO: 11.6 % (ref 19.6–45.3)
MAGNESIUM SERPL-MCNC: 1.8 MG/DL (ref 1.6–2.4)
MCH RBC QN AUTO: 27.8 PG (ref 26.6–33)
MCHC RBC AUTO-ENTMCNC: 32.9 G/DL (ref 31.5–35.7)
MCV RBC AUTO: 84.5 FL (ref 79–97)
MONOCYTES # BLD AUTO: 0.72 10*3/MM3 (ref 0.1–0.9)
MONOCYTES NFR BLD AUTO: 7 % (ref 5–12)
NEUTROPHILS NFR BLD AUTO: 77.4 % (ref 42.7–76)
NEUTROPHILS NFR BLD AUTO: 8.02 10*3/MM3 (ref 1.7–7)
NRBC BLD AUTO-RTO: 0 /100 WBC (ref 0–0.2)
NT-PROBNP SERPL-MCNC: 1919 PG/ML (ref 0–1800)
PLATELET # BLD AUTO: 337 10*3/MM3 (ref 140–450)
PMV BLD AUTO: 9.1 FL (ref 6–12)
POTASSIUM SERPL-SCNC: 3.6 MMOL/L (ref 3.5–5.2)
RBC # BLD AUTO: 3.67 10*6/MM3 (ref 3.77–5.28)
SODIUM SERPL-SCNC: 140 MMOL/L (ref 136–145)
WBC NRBC COR # BLD AUTO: 10.35 10*3/MM3 (ref 3.4–10.8)

## 2024-08-24 PROCEDURE — 87040 BLOOD CULTURE FOR BACTERIA: CPT | Performed by: EMERGENCY MEDICINE

## 2024-08-24 PROCEDURE — 25010000002 FUROSEMIDE PER 20 MG: Performed by: EMERGENCY MEDICINE

## 2024-08-24 PROCEDURE — 63710000001 INSULIN LISPRO (HUMAN) PER 5 UNITS: Performed by: INTERNAL MEDICINE

## 2024-08-24 PROCEDURE — 85025 COMPLETE CBC W/AUTO DIFF WBC: CPT | Performed by: EMERGENCY MEDICINE

## 2024-08-24 PROCEDURE — 83036 HEMOGLOBIN GLYCOSYLATED A1C: CPT | Performed by: INTERNAL MEDICINE

## 2024-08-24 PROCEDURE — 83605 ASSAY OF LACTIC ACID: CPT | Performed by: EMERGENCY MEDICINE

## 2024-08-24 PROCEDURE — 82948 REAGENT STRIP/BLOOD GLUCOSE: CPT

## 2024-08-24 PROCEDURE — 36415 COLL VENOUS BLD VENIPUNCTURE: CPT | Performed by: EMERGENCY MEDICINE

## 2024-08-24 PROCEDURE — 80048 BASIC METABOLIC PNL TOTAL CA: CPT | Performed by: EMERGENCY MEDICINE

## 2024-08-24 PROCEDURE — G0378 HOSPITAL OBSERVATION PER HR: HCPCS

## 2024-08-24 PROCEDURE — 25010000002 HEPARIN (PORCINE) PER 1000 UNITS: Performed by: INTERNAL MEDICINE

## 2024-08-24 PROCEDURE — 83880 ASSAY OF NATRIURETIC PEPTIDE: CPT | Performed by: EMERGENCY MEDICINE

## 2024-08-24 PROCEDURE — 99285 EMERGENCY DEPT VISIT HI MDM: CPT

## 2024-08-24 PROCEDURE — 25010000002 CEFAZOLIN PER 500 MG: Performed by: INTERNAL MEDICINE

## 2024-08-24 PROCEDURE — 63710000001 INSULIN GLARGINE PER 5 UNITS: Performed by: INTERNAL MEDICINE

## 2024-08-24 PROCEDURE — 83735 ASSAY OF MAGNESIUM: CPT | Performed by: EMERGENCY MEDICINE

## 2024-08-24 RX ORDER — ONDANSETRON 4 MG/1
4 TABLET, ORALLY DISINTEGRATING ORAL EVERY 6 HOURS PRN
Status: DISCONTINUED | OUTPATIENT
Start: 2024-08-24 | End: 2024-09-04 | Stop reason: HOSPADM

## 2024-08-24 RX ORDER — PREGABALIN 50 MG/1
50 CAPSULE ORAL 2 TIMES DAILY
Status: DISCONTINUED | OUTPATIENT
Start: 2024-08-24 | End: 2024-09-01

## 2024-08-24 RX ORDER — MUPIROCIN 20 MG/G
1 OINTMENT TOPICAL EVERY 12 HOURS SCHEDULED
Status: DISCONTINUED | OUTPATIENT
Start: 2024-08-24 | End: 2024-08-26

## 2024-08-24 RX ORDER — LABETALOL HYDROCHLORIDE 5 MG/ML
10 INJECTION, SOLUTION INTRAVENOUS
Status: DISCONTINUED | OUTPATIENT
Start: 2024-08-24 | End: 2024-08-24

## 2024-08-24 RX ORDER — FUROSEMIDE 10 MG/ML
40 INJECTION INTRAMUSCULAR; INTRAVENOUS ONCE
Status: COMPLETED | OUTPATIENT
Start: 2024-08-24 | End: 2024-08-24

## 2024-08-24 RX ORDER — NICOTINE POLACRILEX 4 MG
15 LOZENGE BUCCAL
Status: DISCONTINUED | OUTPATIENT
Start: 2024-08-24 | End: 2024-09-04 | Stop reason: HOSPADM

## 2024-08-24 RX ORDER — INSULIN LISPRO 100 [IU]/ML
3-14 INJECTION, SOLUTION INTRAVENOUS; SUBCUTANEOUS
Status: DISCONTINUED | OUTPATIENT
Start: 2024-08-24 | End: 2024-09-04 | Stop reason: HOSPADM

## 2024-08-24 RX ORDER — FAMOTIDINE 20 MG/1
10 TABLET, FILM COATED ORAL 2 TIMES DAILY PRN
Status: DISCONTINUED | OUTPATIENT
Start: 2024-08-24 | End: 2024-09-04 | Stop reason: HOSPADM

## 2024-08-24 RX ORDER — INSULIN LISPRO 100 [IU]/ML
10 INJECTION, SOLUTION INTRAVENOUS; SUBCUTANEOUS
Status: DISCONTINUED | OUTPATIENT
Start: 2024-08-24 | End: 2024-09-02

## 2024-08-24 RX ORDER — MUPIROCIN CALCIUM 20 MG/G
1 CREAM TOPICAL EVERY 12 HOURS SCHEDULED
Status: DISCONTINUED | OUTPATIENT
Start: 2024-08-24 | End: 2024-08-24 | Stop reason: SDUPTHER

## 2024-08-24 RX ORDER — LEVOTHYROXINE SODIUM 112 UG/1
112 TABLET ORAL
Status: DISCONTINUED | OUTPATIENT
Start: 2024-08-25 | End: 2024-08-27

## 2024-08-24 RX ORDER — BISACODYL 10 MG
10 SUPPOSITORY, RECTAL RECTAL DAILY PRN
Status: DISCONTINUED | OUTPATIENT
Start: 2024-08-24 | End: 2024-09-04 | Stop reason: HOSPADM

## 2024-08-24 RX ORDER — INSULIN LISPRO 100 [IU]/ML
2-9 INJECTION, SOLUTION INTRAVENOUS; SUBCUTANEOUS
Status: DISCONTINUED | OUTPATIENT
Start: 2024-08-24 | End: 2024-08-24

## 2024-08-24 RX ORDER — HEPARIN SODIUM 5000 [USP'U]/ML
5000 INJECTION, SOLUTION INTRAVENOUS; SUBCUTANEOUS EVERY 12 HOURS SCHEDULED
Status: DISCONTINUED | OUTPATIENT
Start: 2024-08-24 | End: 2024-09-04 | Stop reason: HOSPADM

## 2024-08-24 RX ORDER — ACETAMINOPHEN 650 MG/1
650 SUPPOSITORY RECTAL EVERY 4 HOURS PRN
Status: DISCONTINUED | OUTPATIENT
Start: 2024-08-24 | End: 2024-09-04 | Stop reason: HOSPADM

## 2024-08-24 RX ORDER — POLYETHYLENE GLYCOL 3350 17 G/17G
17 POWDER, FOR SOLUTION ORAL DAILY PRN
Status: DISCONTINUED | OUTPATIENT
Start: 2024-08-24 | End: 2024-09-04 | Stop reason: HOSPADM

## 2024-08-24 RX ORDER — DULOXETIN HYDROCHLORIDE 30 MG/1
30 CAPSULE, DELAYED RELEASE ORAL DAILY
Status: DISCONTINUED | OUTPATIENT
Start: 2024-08-24 | End: 2024-09-04 | Stop reason: HOSPADM

## 2024-08-24 RX ORDER — SODIUM CHLORIDE 0.9 % (FLUSH) 0.9 %
3 SYRINGE (ML) INJECTION EVERY 12 HOURS SCHEDULED
Status: DISCONTINUED | OUTPATIENT
Start: 2024-08-24 | End: 2024-09-04 | Stop reason: HOSPADM

## 2024-08-24 RX ORDER — FUROSEMIDE 10 MG/ML
40 INJECTION INTRAMUSCULAR; INTRAVENOUS
Status: DISCONTINUED | OUTPATIENT
Start: 2024-08-25 | End: 2024-08-26

## 2024-08-24 RX ORDER — ACETAMINOPHEN 325 MG/1
650 TABLET ORAL EVERY 4 HOURS PRN
Status: DISCONTINUED | OUTPATIENT
Start: 2024-08-24 | End: 2024-09-04 | Stop reason: HOSPADM

## 2024-08-24 RX ORDER — BISACODYL 5 MG/1
5 TABLET, DELAYED RELEASE ORAL DAILY PRN
Status: DISCONTINUED | OUTPATIENT
Start: 2024-08-24 | End: 2024-09-04 | Stop reason: HOSPADM

## 2024-08-24 RX ORDER — SODIUM CHLORIDE 0.9 % (FLUSH) 0.9 %
3-10 SYRINGE (ML) INJECTION AS NEEDED
Status: DISCONTINUED | OUTPATIENT
Start: 2024-08-24 | End: 2024-09-04 | Stop reason: HOSPADM

## 2024-08-24 RX ORDER — PANTOPRAZOLE SODIUM 40 MG/1
40 TABLET, DELAYED RELEASE ORAL
Status: DISCONTINUED | OUTPATIENT
Start: 2024-08-25 | End: 2024-09-04 | Stop reason: HOSPADM

## 2024-08-24 RX ORDER — L.ACID,PARA/B.BIFIDUM/S.THERM 8B CELL
1 CAPSULE ORAL DAILY
Status: DISCONTINUED | OUTPATIENT
Start: 2024-08-24 | End: 2024-09-04 | Stop reason: HOSPADM

## 2024-08-24 RX ORDER — HYDROCODONE BITARTRATE AND ACETAMINOPHEN 5; 325 MG/1; MG/1
1 TABLET ORAL ONCE
Status: COMPLETED | OUTPATIENT
Start: 2024-08-24 | End: 2024-08-24

## 2024-08-24 RX ORDER — BISOPROLOL FUMARATE 5 MG/1
5 TABLET, FILM COATED ORAL DAILY
Status: DISCONTINUED | OUTPATIENT
Start: 2024-08-24 | End: 2024-09-04 | Stop reason: HOSPADM

## 2024-08-24 RX ORDER — DEXTROSE MONOHYDRATE 25 G/50ML
25 INJECTION, SOLUTION INTRAVENOUS
Status: DISCONTINUED | OUTPATIENT
Start: 2024-08-24 | End: 2024-09-04 | Stop reason: HOSPADM

## 2024-08-24 RX ORDER — HYDROCODONE BITARTRATE AND ACETAMINOPHEN 5; 325 MG/1; MG/1
1 TABLET ORAL EVERY 6 HOURS PRN
Status: DISPENSED | OUTPATIENT
Start: 2024-08-24 | End: 2024-08-31

## 2024-08-24 RX ORDER — AMOXICILLIN 250 MG
2 CAPSULE ORAL 2 TIMES DAILY PRN
Status: DISCONTINUED | OUTPATIENT
Start: 2024-08-24 | End: 2024-09-04 | Stop reason: HOSPADM

## 2024-08-24 RX ORDER — AMLODIPINE BESYLATE 5 MG/1
2.5 TABLET ORAL DAILY
Status: DISCONTINUED | OUTPATIENT
Start: 2024-08-25 | End: 2024-08-25

## 2024-08-24 RX ORDER — SODIUM CHLORIDE 9 MG/ML
40 INJECTION, SOLUTION INTRAVENOUS AS NEEDED
Status: DISCONTINUED | OUTPATIENT
Start: 2024-08-24 | End: 2024-09-04 | Stop reason: HOSPADM

## 2024-08-24 RX ORDER — ATORVASTATIN CALCIUM 80 MG/1
80 TABLET, FILM COATED ORAL NIGHTLY
Status: DISCONTINUED | OUTPATIENT
Start: 2024-08-24 | End: 2024-09-04 | Stop reason: HOSPADM

## 2024-08-24 RX ORDER — ONDANSETRON 2 MG/ML
4 INJECTION INTRAMUSCULAR; INTRAVENOUS EVERY 6 HOURS PRN
Status: DISCONTINUED | OUTPATIENT
Start: 2024-08-24 | End: 2024-09-04 | Stop reason: HOSPADM

## 2024-08-24 RX ORDER — ACETAMINOPHEN 160 MG/5ML
650 SOLUTION ORAL EVERY 4 HOURS PRN
Status: DISCONTINUED | OUTPATIENT
Start: 2024-08-24 | End: 2024-09-04 | Stop reason: HOSPADM

## 2024-08-24 RX ORDER — LABETALOL 100 MG/1
50 TABLET, FILM COATED ORAL EVERY 6 HOURS PRN
Status: DISCONTINUED | OUTPATIENT
Start: 2024-08-24 | End: 2024-09-04 | Stop reason: HOSPADM

## 2024-08-24 RX ORDER — IBUPROFEN 600 MG/1
1 TABLET ORAL
Status: DISCONTINUED | OUTPATIENT
Start: 2024-08-24 | End: 2024-09-04 | Stop reason: HOSPADM

## 2024-08-24 RX ORDER — VANCOMYCIN/0.9 % SOD CHLORIDE 1.5G/250ML
1500 PLASTIC BAG, INJECTION (ML) INTRAVENOUS ONCE
Status: COMPLETED | OUTPATIENT
Start: 2024-08-24 | End: 2024-08-25

## 2024-08-24 RX ADMIN — INSULIN GLARGINE 60 UNITS: 100 INJECTION, SOLUTION SUBCUTANEOUS at 23:09

## 2024-08-24 RX ADMIN — PREGABALIN 50 MG: 50 CAPSULE ORAL at 23:06

## 2024-08-24 RX ADMIN — Medication 3 ML: at 23:07

## 2024-08-24 RX ADMIN — Medication 2.5 MG: at 23:06

## 2024-08-24 RX ADMIN — FUROSEMIDE 40 MG: 10 INJECTION, SOLUTION INTRAMUSCULAR; INTRAVENOUS at 20:57

## 2024-08-24 RX ADMIN — INSULIN LISPRO 10 UNITS: 100 INJECTION, SOLUTION INTRAVENOUS; SUBCUTANEOUS at 23:08

## 2024-08-24 RX ADMIN — HEPARIN SODIUM 5000 UNITS: 5000 INJECTION INTRAVENOUS; SUBCUTANEOUS at 23:08

## 2024-08-24 RX ADMIN — HYDROCODONE BITARTRATE AND ACETAMINOPHEN 1 TABLET: 5; 325 TABLET ORAL at 20:55

## 2024-08-24 RX ADMIN — CEFAZOLIN 2000 MG: 2 INJECTION, POWDER, FOR SOLUTION INTRAVENOUS at 21:05

## 2024-08-24 RX ADMIN — MUPIROCIN 1 APPLICATION: 20 OINTMENT TOPICAL at 21:10

## 2024-08-24 NOTE — ED NOTES
Patient states she has cellulitis and the nurse at her Assisted Living facility told her she had to be evaluated.

## 2024-08-24 NOTE — ED PROVIDER NOTES
EMERGENCY DEPARTMENT ENCOUNTER    Room Number:  34/34  PCP: Napoleon Mead MD  Historian: Patient      HPI:  Chief Complaint: Lower extremity swelling, cellulitis  A complete HPI/ROS/PMH/PSH/SH/FH are unobtainable due to: None    Context: Halie Guidry is a 84 y.o. female who presents to the ED via [facility] from assisted living facility for increasing lower extremity edema and erythema and concern for cellulitis.  Patient states that symptoms have been progressively worsening over several months, had been scratching and causing wounds.  Has not been on any antibiotics.  Her chart says that she is on Lasix but has not been on that for several months.  Denies any significant fevers, chills, chest pain, shortness of breath.    MEDICAL RECORD REVIEW    External (non-ED) record review: Primary care office visit note reviewed from July 30, 2024 does not mention anything about lower extremity edema at that time              PAST MEDICAL HISTORY  Active Ambulatory Problems     Diagnosis Date Noted    Compression fracture 04/22/2009    Lumbar degenerative disc disease 07/05/2012    Type 2 diabetes mellitus with hyperglycemia, with long-term current use of insulin     Hyperlipidemia     Benign essential hypertension 08/20/2014    Primary hypothyroidism     Neuropathy     Osteoporosis 09/09/2015    Proteinuria 02/28/2012    Tobacco abuse 08/22/2013    Generalized weakness 05/27/2017    Spinal stenosis 05/27/2017    Scoliosis 05/27/2017    Arthritis 05/27/2017    VBI (vertebrobasilar insufficiency) 05/28/2017    Orthostatic hypotension 05/28/2017    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 05/28/2017    Severe hypothyroidism 05/30/2017    Noncompliance with medication regimen 05/30/2017    Vitamin D deficiency disease 06/01/2017    Tremor 01/09/2018    Seizure 05/21/2019    Thoracic degenerative disc disease 01/27/2020    Hyperglycemia 12/02/2021    Type II diabetes mellitus, uncontrolled 12/02/2021     Lower abdominal pain 02/23/2022    Transaminitis 02/23/2022    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    Choledocholithiasis 02/23/2022    Hypokalemia 02/24/2022    Hypoxia 02/24/2022    Generalized abdominal pain 03/26/2022    History of Clostridium difficile infection 03/26/2022    History of ERCP 03/26/2022    Hypomagnesemia 03/28/2022    Anxiety disorder 05/27/2022    Neuropathic pain 05/27/2022    Intertrigo 05/27/2022    Weakness of both lower extremities 06/24/2022    Accelerated hypertension 09/01/2022    Acute cystitis without hematuria 09/06/2022    Left lower lobe pneumonia 11/04/2022    Cellulitis and abscess of left lower extremity 05/15/2023    Benign hypertension with CKD (chronic kidney disease) stage IV 06/02/2023    Peripheral edema 07/30/2023    Primary malignant neuroendocrine tumor of pancreas 08/24/2023    Encounter for long-term (current) use of high-risk medication 08/28/2023    Diabetic foot ulcer 12/24/2023    Stage 3a chronic kidney disease 01/28/2024    Pressure injury of buttock, stage 2 01/28/2024    HTN (hypertension) 04/21/2024    Anemia due to chronic kidney disease 05/17/2024     Resolved Ambulatory Problems     Diagnosis Date Noted    Leukocytosis     Diabetic eye exam 02/12/2014    Altered mental status 12/15/2017    Stage 3a chronic kidney disease 03/09/2012    Metabolic encephalopathy 12/02/2021    VIPUL (acute kidney injury) 12/02/2021    COVID-19 virus detected 02/23/2022    UTI (urinary tract infection) 02/23/2022    Acute UTI (urinary tract infection) 03/27/2022    Burning pain 05/27/2022    Acute metabolic encephalopathy 06/22/2022    Hypoglycemia 06/23/2022    Acute metabolic encephalopathy 06/24/2022    Altered mental status, unspecified altered mental status type 11/04/2022    Diarrhea 11/04/2022    Acute pain of left foot 05/13/2023    Bilateral leg pain 07/21/2023    Acute renal insufficiency 01/09/2024    COVID-19 01/10/2024    Cytokine release syndrome, grade 1 01/15/2024     C. difficile diarrhea 01/15/2024    Sepsis 01/27/2024    Metabolic encephalopathy 01/28/2024    Uncontrolled diabetes mellitus with hyperglycemia 04/20/2024    Pseudohyponatremia 04/21/2024     Past Medical History:   Diagnosis Date    Anxiety     Bleeding disorder     Depression     Diabetes     Diabetes mellitus, type 2     Disc degeneration, lumbar     Headache, tension-type     Hypothyroidism     Peripheral neuropathy     Rotator cuff tear, left     Shoulder pain          PAST SURGICAL HISTORY  Past Surgical History:   Procedure Laterality Date    APPENDECTOMY      BILATERAL BREAST REDUCTION Bilateral 08/2015    CATARACT EXTRACTION  03/2015    CHOLECYSTECTOMY WITH INTRAOPERATIVE CHOLANGIOGRAM N/A 3/27/2022    Procedure: CHOLECYSTECTOMY LAPAROSCOPIC INTRAOPERATIVE CHOLANGIOGRAM;  Surgeon: Aiyana Hill MD;  Location: [facility] MAIN OR;  Service: General;  Laterality: N/A;    COLONOSCOPY  06/05/2015    WNL    ERCP N/A 2/25/2022    Procedure: ENDOSCOPIC RETROGRADE CHOLANGIOPANCREATOGRAPHY with sphincterotomy and balloon sweep;  Surgeon: Chilo Wilhelm MD;  Location: [facility] ENDOSCOPY;  Service: Gastroenterology;  Laterality: N/A;  PRE/POST - CBD stones    ERCP N/A 3/28/2022    Procedure: ENDOSCOPIC RETROGRADE CHOLANGIOPANCREATOGRAPHY WITH SPHINCTEROTOMY AND BALLOON SWEEP;  Surgeon: Chilo Wilhelm MD;  Location: [facility] ENDOSCOPY;  Service: Gastroenterology;  Laterality: N/A;  PRE: COMMON DUCT STONE  POST: COMMON DUCT STONE    KYPHOPLASTY      REDUCTION MAMMAPLASTY      TONSILLECTOMY           FAMILY HISTORY  Family History   Problem Relation Age of Onset    Bone cancer Mother     No Known Problems Father     Heart disease Daughter          SOCIAL HISTORY  Social History     Socioeconomic History    Marital status:     Number of children: 3   Tobacco Use    Smoking status: Former     Current packs/day: 0.00     Average packs/day: 1 pack/day for 40.0 years (40.0 ttl pk-yrs)     Types: Cigarettes      Start date:      Quit date:      Years since quittin.6    Smokeless tobacco: Never   Vaping Use    Vaping status: Never Used   Substance and Sexual Activity    Alcohol use: No    Drug use: No    Sexual activity: Defer         ALLERGIES  Sulfa antibiotics        REVIEW OF SYSTEMS  Review of Systems     All systems reviewed and negative except for those discussed in HPI.       PHYSICAL EXAM    I have reviewed the triage vital signs and nursing notes.    ED Triage Vitals [24 1515]   Temp Heart Rate Resp BP SpO2   97.6 °F (36.4 °C) 65 16 156/65 97 %      Temp src Heart Rate Source Patient Position BP Location FiO2 (%)   Oral -- -- -- --       Physical Exam  General: No acute distress, nontoxic  HEENT: Mucous membranes moist, atraumatic, EOMI  Neck: Full ROM  Pulm: Symmetric chest rise, nonlabored, lungs CTAB  Cardiovascular: Regular rate and rhythm, intact distal pulses, bilateral lower extremity edema with chronic venous stasis changes  GI: Soft, nontender, nondistended, no rebound, no guarding, bowel sounds present  MSK: Full ROM, no deformity  Skin: Warm, dry, some increasing erythema distally with tenderness with scattered lesions that appear purulent  Neuro: Awake, alert, oriented x 4, GCS 15, moving all extremities, no focal deficits  Psych: Calm, cooperative        LAB RESULTS  Recent Results (from the past 24 hour(s))   Basic Metabolic Panel    Collection Time: 24  5:33 PM    Specimen: Blood   Result Value Ref Range    Glucose 261 (H) 65 - 99 mg/dL    BUN 20 8 - 23 mg/dL    Creatinine 1.78 (H) 0.57 - 1.00 mg/dL    Sodium 140 136 - 145 mmol/L    Potassium 3.6 3.5 - 5.2 mmol/L    Chloride 104 98 - 107 mmol/L    CO2 24.0 22.0 - 29.0 mmol/L    Calcium 8.8 8.6 - 10.5 mg/dL    BUN/Creatinine Ratio 11.2 7.0 - 25.0    Anion Gap 12.0 5.0 - 15.0 mmol/L    eGFR 27.9 (L) >60.0 mL/min/1.73   BNP    Collection Time: 24  5:33 PM    Specimen: Blood   Result Value Ref Range    proBNP 1,919.0  (H) 0.0 - 1,800.0 pg/mL   Magnesium    Collection Time: 08/24/24  5:33 PM    Specimen: Blood   Result Value Ref Range    Magnesium 1.8 1.6 - 2.4 mg/dL   CBC Auto Differential    Collection Time: 08/24/24  5:33 PM    Specimen: Blood   Result Value Ref Range    WBC 10.35 3.40 - 10.80 10*3/mm3    RBC 3.67 (L) 3.77 - 5.28 10*6/mm3    Hemoglobin 10.2 (L) 12.0 - 15.9 g/dL    Hematocrit 31.0 (L) 34.0 - 46.6 %    MCV 84.5 79.0 - 97.0 fL    MCH 27.8 26.6 - 33.0 pg    MCHC 32.9 31.5 - 35.7 g/dL    RDW 14.6 12.3 - 15.4 %    RDW-SD 44.8 37.0 - 54.0 fl    MPV 9.1 6.0 - 12.0 fL    Platelets 337 140 - 450 10*3/mm3    Neutrophil % 77.4 (H) 42.7 - 76.0 %    Lymphocyte % 11.6 (L) 19.6 - 45.3 %    Monocyte % 7.0 5.0 - 12.0 %    Eosinophil % 2.4 0.3 - 6.2 %    Basophil % 1.0 0.0 - 1.5 %    Immature Grans % 0.6 (H) 0.0 - 0.5 %    Neutrophils, Absolute 8.02 (H) 1.70 - 7.00 10*3/mm3    Lymphocytes, Absolute 1.20 0.70 - 3.10 10*3/mm3    Monocytes, Absolute 0.72 0.10 - 0.90 10*3/mm3    Eosinophils, Absolute 0.25 0.00 - 0.40 10*3/mm3    Basophils, Absolute 0.10 0.00 - 0.20 10*3/mm3    Immature Grans, Absolute 0.06 (H) 0.00 - 0.05 10*3/mm3    nRBC 0.0 0.0 - 0.2 /100 WBC       Ordered the above labs and independently interpreted results. My findings will be discussed in the medical decision making section below        RADIOLOGY  No Radiology Exams Resulted Within Past 24 Hours    Ordered the above noted radiological studies.  Independently interpreted by me and my independent review of findings can be found in the ED Course.  See dictation for official radiology interpretation.      PROCEDURES    Procedures        MEDICATIONS GIVEN IN ER    Medications   furosemide (LASIX) injection 40 mg (has no administration in time range)   mupirocin (BACTROBAN) 2 % ointment 1 Application (has no administration in time range)   HYDROcodone-acetaminophen (NORCO) 5-325 MG per tablet 1 tablet (has no administration in time range)   lactobacillus acidophilus  (RISAQUAD) capsule 1 capsule (has no administration in time range)   ceFAZolin 2000 mg IVPB in 100 mL NS (MBP) (has no administration in time range)   furosemide (LASIX) injection 40 mg (has no administration in time range)   dextrose (GLUTOSE) oral gel 15 g (has no administration in time range)   dextrose (D50W) (25 g/50 mL) IV injection 25 g (has no administration in time range)   glucagon (GLUCAGEN) injection 1 mg (has no administration in time range)   HYDROcodone-acetaminophen (NORCO) 5-325 MG per tablet 1 tablet (has no administration in time range)   amLODIPine (NORVASC) tablet 2.5 mg (has no administration in time range)   atorvastatin (LIPITOR) tablet 80 mg (has no administration in time range)   bisoprolol (ZEBeta) tablet 5 mg (has no administration in time range)   DULoxetine (CYMBALTA) DR capsule 30 mg (has no administration in time range)   insulin glargine (LANTUS, SEMGLEE) injection 60 Units (has no administration in time range)   insulin lispro (HUMALOG/ADMELOG) injection 10 Units (has no administration in time range)   pantoprazole (PROTONIX) EC tablet 40 mg (has no administration in time range)   levothyroxine (SYNTHROID, LEVOTHROID) tablet 112 mcg (has no administration in time range)   pregabalin (LYRICA) capsule 50 mg (has no administration in time range)   insulin lispro (HUMALOG/ADMELOG) injection 3-14 Units (has no administration in time range)   sodium chloride 0.9 % flush 3 mL (has no administration in time range)   sodium chloride 0.9 % flush 3-10 mL (has no administration in time range)   sodium chloride 0.9 % infusion 40 mL (has no administration in time range)   famotidine (PEPCID) tablet 10 mg (has no administration in time range)   ondansetron ODT (ZOFRAN-ODT) disintegrating tablet 4 mg (has no administration in time range)     Or   ondansetron (ZOFRAN) injection 4 mg (has no administration in time range)   melatonin tablet 2.5 mg (has no administration in time range)   heparin  (porcine) 5000 UNIT/ML injection 5,000 Units (has no administration in time range)   acetaminophen (TYLENOL) tablet 650 mg (has no administration in time range)     Or   acetaminophen (TYLENOL) 160 MG/5ML oral solution 650 mg (has no administration in time range)     Or   acetaminophen (TYLENOL) suppository 650 mg (has no administration in time range)   sennosides-docusate (PERICOLACE) 8.6-50 MG per tablet 2 tablet (has no administration in time range)     And   polyethylene glycol (MIRALAX) packet 17 g (has no administration in time range)     And   bisacodyl (DULCOLAX) EC tablet 5 mg (has no administration in time range)     And   bisacodyl (DULCOLAX) suppository 10 mg (has no administration in time range)   labetalol (NORMODYNE) tablet 50 mg (has no administration in time range)         PROGRESS, DATA ANALYSIS, CONSULTS, AND MEDICAL DECISION MAKING    Please note that this section constitutes my independent interpretation of clinical data including lab results, radiology, EKG's.  This constitutes my independent professional opinion regarding differential diagnosis and management of this patient.  It may include any factors such as history from outside sources, review of external records, social determinants of health, management of medications, response to those treatments, and discussions with other providers.    Differential Diagnosis and Plan: Initial concern for chronic venous stasis with chronic swelling with overlying developing potential minor cellulitis, plan for labs, evaluating heart and renal function, BC, and reevaluate with results.    Additional sources:  - Discussed/ obtained information from independent historians:       - (Social Determinants of Health): None     - Shared decision making:  Patient fully updated on and in agreement with the course and plan moving forward    ED Course as of 08/24/24 1949   Sat Aug 24, 2024   1808 WBC: 10.35 [DC]   1808 Hemoglobin(!): 10.2 [DC]   1808 Platelets: 337  [DC]   1818 Magnesium: 1.8 [DC]   1818 Glucose(!): 261 [DC]   1818 BUN: 20 [DC]   1818 Creatinine(!): 1.78  1.76 one month ago [DC]   1818 Sodium: 140 [DC]   1818 Potassium: 3.6 [DC]   1819 proBNP(!): 1,919.0  320 one year ago [DC]   1901 Discussed with Dr. Upton, A, discussed that patient clinical course and find today, treatment modalities, and need for hospitalization.  Discussed MedSurg placement. [DC]      ED Course User Index  [DC] Ziggy Prince MD       Hospitalization Considered?: yes    Orders Placed During This Visit:  Orders Placed This Encounter   Procedures    Blood Culture - Blood,    Blood Culture - Blood,    Basic Metabolic Panel    BNP    Magnesium    CBC Auto Differential    Lactic Acid, Plasma    Hemoglobin A1c    CBC (No Diff)    Basic Metabolic Panel    Diet: Diabetic, Cardiac; Healthy Heart (2-3 Na+); Consistent Carbohydrate; Fluid Consistency: Thin (IDDSI 0)    Vital Signs    Notify Provider (With Default Parameters)    Intake & Output    Weigh Patient    Oral Care    Saline Lock & Maintain IV Access    Activity - Ad Erin    Code Status and Medical Interventions: No CPR (Do Not Attempt to Resuscitate); Limited Support; No intubation (DNI)    [facility] (on-call MD unless specified) Details    Inpatient Nephrology Consult    OT Consult: Eval & Treat ADL Performance Below Baseline    PT Consult: Eval & Treat Discharge Placement Assessment    Oxygen Therapy- Nasal Cannula; Titrate 1-6 LPM Per SpO2; 90 - 95%    POC Glucose 4x Daily Before Meals & at Bedtime    Adult Transthoracic Echo Complete W/ Cont if Necessary Per Protocol    Wound Ostomy Eval & Treat Diabetic Ulcer    Insert Peripheral IV    Initiate Observation Status    CBC & Differential       Additional orders considered but not placed:      Independent interpretation of labs, radiology studies, and discussions with consultants: See ED Course        AS OF 19:49 EDT VITALS:    BP - 170/74  HR - 66  TEMP - 97.6 °F (36.4 °C) (Oral)  02 SATS -  93%          DIAGNOSIS  Final diagnoses:   Cellulitis of lower extremity, unspecified laterality   Bilateral lower extremity edema   Chronic kidney disease, unspecified CKD stage   History of diabetes mellitus         DISPOSITION  ED Disposition       ED Disposition   Decision to Admit    Condition   --    Comment   Level of Care: Med/Surg [1]   Diagnosis: Bilateral lower extremity edema [685235]   Admitting Physician: LINDSEY HUGHES [[id number]]   Attending Physician: LINDSEY HUGHES [[id number]]   Bed Request Comments: Not 5 S                  Please note that portions of this document were completed with a voice recognition program.    Note Disclaimer: At [facility], we believe that sharing information builds trust and better relationships. You are receiving this note because you recently visited [facility]. It is possible you will see health information before a provider has talked with you about it. This kind of information can be easy to misunderstand. To help you fully understand what it means for your health, we urge you to discuss this note with your provider.                       Ziggy Prince MD  08/24/24 1949

## 2024-08-24 NOTE — ED TRIAGE NOTES
Patient to ED via EMS from [facility]. Patient c/o bilateral leg swelling and weeping that she noticed a couple of days ago. Facility has tried to treat by wrapping legs and lotions with no improvement.

## 2024-08-25 PROBLEM — L03.115 BILATERAL CELLULITIS OF LOWER LEG: Status: ACTIVE | Noted: 2024-08-25

## 2024-08-25 PROBLEM — L03.116 BILATERAL CELLULITIS OF LOWER LEG: Status: ACTIVE | Noted: 2024-08-25

## 2024-08-25 LAB
ANION GAP SERPL CALCULATED.3IONS-SCNC: 12 MMOL/L (ref 5–15)
BUN SERPL-MCNC: 22 MG/DL (ref 8–23)
BUN/CREAT SERPL: 11.9 (ref 7–25)
CALCIUM SPEC-SCNC: 8.2 MG/DL (ref 8.6–10.5)
CHLORIDE SERPL-SCNC: 105 MMOL/L (ref 98–107)
CO2 SERPL-SCNC: 24 MMOL/L (ref 22–29)
CREAT SERPL-MCNC: 1.85 MG/DL (ref 0.57–1)
DEPRECATED RDW RBC AUTO: 46.7 FL (ref 37–54)
EGFRCR SERPLBLD CKD-EPI 2021: 26.6 ML/MIN/1.73
ERYTHROCYTE [DISTWIDTH] IN BLOOD BY AUTOMATED COUNT: 14.9 % (ref 12.3–15.4)
GLUCOSE BLDC GLUCOMTR-MCNC: 121 MG/DL (ref 70–130)
GLUCOSE BLDC GLUCOMTR-MCNC: 163 MG/DL (ref 70–130)
GLUCOSE BLDC GLUCOMTR-MCNC: 164 MG/DL (ref 70–130)
GLUCOSE BLDC GLUCOMTR-MCNC: 170 MG/DL (ref 70–130)
GLUCOSE SERPL-MCNC: 166 MG/DL (ref 65–99)
HCT VFR BLD AUTO: 30 % (ref 34–46.6)
HGB BLD-MCNC: 9.5 G/DL (ref 12–15.9)
MAGNESIUM SERPL-MCNC: 1.8 MG/DL (ref 1.6–2.4)
MCH RBC QN AUTO: 27.1 PG (ref 26.6–33)
MCHC RBC AUTO-ENTMCNC: 31.7 G/DL (ref 31.5–35.7)
MCV RBC AUTO: 85.5 FL (ref 79–97)
PLATELET # BLD AUTO: 341 10*3/MM3 (ref 140–450)
PMV BLD AUTO: 9.2 FL (ref 6–12)
POTASSIUM SERPL-SCNC: 3.1 MMOL/L (ref 3.5–5.2)
POTASSIUM SERPL-SCNC: 4.1 MMOL/L (ref 3.5–5.2)
RBC # BLD AUTO: 3.51 10*6/MM3 (ref 3.77–5.28)
SODIUM SERPL-SCNC: 141 MMOL/L (ref 136–145)
WBC NRBC COR # BLD AUTO: 12.89 10*3/MM3 (ref 3.4–10.8)

## 2024-08-25 PROCEDURE — 25810000003 SODIUM CHLORIDE 0.9 % SOLUTION: Performed by: INTERNAL MEDICINE

## 2024-08-25 PROCEDURE — 63710000001 INSULIN GLARGINE PER 5 UNITS: Performed by: INTERNAL MEDICINE

## 2024-08-25 PROCEDURE — 25810000003 SODIUM CHLORIDE 0.9 % SOLUTION 250 ML FLEX CONT: Performed by: INTERNAL MEDICINE

## 2024-08-25 PROCEDURE — 25010000002 VANCOMYCIN 10 G RECONSTITUTED SOLUTION: Performed by: INTERNAL MEDICINE

## 2024-08-25 PROCEDURE — 97166 OT EVAL MOD COMPLEX 45 MIN: CPT

## 2024-08-25 PROCEDURE — 85027 COMPLETE CBC AUTOMATED: CPT | Performed by: INTERNAL MEDICINE

## 2024-08-25 PROCEDURE — 97530 THERAPEUTIC ACTIVITIES: CPT

## 2024-08-25 PROCEDURE — 25010000002 HYDRALAZINE PER 20 MG: Performed by: STUDENT IN AN ORGANIZED HEALTH CARE EDUCATION/TRAINING PROGRAM

## 2024-08-25 PROCEDURE — 80048 BASIC METABOLIC PNL TOTAL CA: CPT | Performed by: INTERNAL MEDICINE

## 2024-08-25 PROCEDURE — 82948 REAGENT STRIP/BLOOD GLUCOSE: CPT

## 2024-08-25 PROCEDURE — 84132 ASSAY OF SERUM POTASSIUM: CPT | Performed by: STUDENT IN AN ORGANIZED HEALTH CARE EDUCATION/TRAINING PROGRAM

## 2024-08-25 PROCEDURE — 83735 ASSAY OF MAGNESIUM: CPT | Performed by: STUDENT IN AN ORGANIZED HEALTH CARE EDUCATION/TRAINING PROGRAM

## 2024-08-25 PROCEDURE — 25010000002 CEFAZOLIN PER 500 MG: Performed by: INTERNAL MEDICINE

## 2024-08-25 PROCEDURE — 25010000002 FUROSEMIDE PER 20 MG: Performed by: INTERNAL MEDICINE

## 2024-08-25 PROCEDURE — 25010000002 HEPARIN (PORCINE) PER 1000 UNITS: Performed by: INTERNAL MEDICINE

## 2024-08-25 PROCEDURE — 97162 PT EVAL MOD COMPLEX 30 MIN: CPT

## 2024-08-25 PROCEDURE — 63710000001 INSULIN LISPRO (HUMAN) PER 5 UNITS: Performed by: INTERNAL MEDICINE

## 2024-08-25 PROCEDURE — 25010000002 VANCOMYCIN 750 MG RECONSTITUTED SOLUTION 1 EACH VIAL: Performed by: INTERNAL MEDICINE

## 2024-08-25 RX ORDER — SPIRONOLACTONE 25 MG/1
25 TABLET ORAL DAILY
Status: DISCONTINUED | OUTPATIENT
Start: 2024-08-25 | End: 2024-08-27

## 2024-08-25 RX ORDER — AMLODIPINE BESYLATE 2.5 MG/1
2.5 TABLET ORAL DAILY
Status: DISCONTINUED | OUTPATIENT
Start: 2024-08-26 | End: 2024-08-31

## 2024-08-25 RX ORDER — HYDRALAZINE HYDROCHLORIDE 20 MG/ML
10 INJECTION INTRAMUSCULAR; INTRAVENOUS EVERY 6 HOURS PRN
Status: DISCONTINUED | OUTPATIENT
Start: 2024-08-25 | End: 2024-09-04 | Stop reason: HOSPADM

## 2024-08-25 RX ORDER — HYDRALAZINE HYDROCHLORIDE 25 MG/1
25 TABLET, FILM COATED ORAL EVERY 8 HOURS SCHEDULED
Status: DISCONTINUED | OUTPATIENT
Start: 2024-08-25 | End: 2024-08-28

## 2024-08-25 RX ORDER — POTASSIUM CHLORIDE 750 MG/1
40 TABLET, FILM COATED, EXTENDED RELEASE ORAL EVERY 4 HOURS
Status: COMPLETED | OUTPATIENT
Start: 2024-08-25 | End: 2024-08-25

## 2024-08-25 RX ORDER — AMLODIPINE BESYLATE 5 MG/1
5 TABLET ORAL DAILY
Status: DISCONTINUED | OUTPATIENT
Start: 2024-08-26 | End: 2024-08-25

## 2024-08-25 RX ORDER — POTASSIUM CHLORIDE 750 MG/1
40 TABLET, FILM COATED, EXTENDED RELEASE ORAL EVERY 4 HOURS
Status: DISCONTINUED | OUTPATIENT
Start: 2024-08-25 | End: 2024-08-25

## 2024-08-25 RX ORDER — HYDROXYZINE HYDROCHLORIDE 25 MG/1
25 TABLET, FILM COATED ORAL ONCE
Status: COMPLETED | OUTPATIENT
Start: 2024-08-25 | End: 2024-08-25

## 2024-08-25 RX ADMIN — BISOPROLOL FUMARATE 5 MG: 5 TABLET, FILM COATED ORAL at 08:35

## 2024-08-25 RX ADMIN — MUPIROCIN 1 APPLICATION: 20 OINTMENT TOPICAL at 10:59

## 2024-08-25 RX ADMIN — DULOXETINE HYDROCHLORIDE 30 MG: 30 CAPSULE, DELAYED RELEASE ORAL at 00:20

## 2024-08-25 RX ADMIN — Medication 3 ML: at 08:36

## 2024-08-25 RX ADMIN — PREGABALIN 50 MG: 50 CAPSULE ORAL at 21:13

## 2024-08-25 RX ADMIN — HYDROCODONE BITARTRATE AND ACETAMINOPHEN 1 TABLET: 5; 325 TABLET ORAL at 17:18

## 2024-08-25 RX ADMIN — MUPIROCIN 1 APPLICATION: 20 OINTMENT TOPICAL at 20:57

## 2024-08-25 RX ADMIN — HYDROCODONE BITARTRATE AND ACETAMINOPHEN 1 TABLET: 5; 325 TABLET ORAL at 02:28

## 2024-08-25 RX ADMIN — INSULIN GLARGINE 60 UNITS: 100 INJECTION, SOLUTION SUBCUTANEOUS at 21:13

## 2024-08-25 RX ADMIN — ATORVASTATIN CALCIUM 80 MG: 80 TABLET, FILM COATED ORAL at 00:20

## 2024-08-25 RX ADMIN — FUROSEMIDE 40 MG: 10 INJECTION, SOLUTION INTRAMUSCULAR; INTRAVENOUS at 08:35

## 2024-08-25 RX ADMIN — CEFAZOLIN 2000 MG: 2 INJECTION, POWDER, FOR SOLUTION INTRAVENOUS at 03:39

## 2024-08-25 RX ADMIN — INSULIN LISPRO 3 UNITS: 100 INJECTION, SOLUTION INTRAVENOUS; SUBCUTANEOUS at 08:36

## 2024-08-25 RX ADMIN — ATORVASTATIN CALCIUM 80 MG: 80 TABLET, FILM COATED ORAL at 21:00

## 2024-08-25 RX ADMIN — HEPARIN SODIUM 5000 UNITS: 5000 INJECTION INTRAVENOUS; SUBCUTANEOUS at 20:56

## 2024-08-25 RX ADMIN — INSULIN LISPRO 10 UNITS: 100 INJECTION, SOLUTION INTRAVENOUS; SUBCUTANEOUS at 17:17

## 2024-08-25 RX ADMIN — PREGABALIN 50 MG: 50 CAPSULE ORAL at 08:35

## 2024-08-25 RX ADMIN — SPIRONOLACTONE 25 MG: 25 TABLET ORAL at 21:00

## 2024-08-25 RX ADMIN — HYDRALAZINE HYDROCHLORIDE 25 MG: 25 TABLET ORAL at 23:39

## 2024-08-25 RX ADMIN — HYDROCODONE BITARTRATE AND ACETAMINOPHEN 1 TABLET: 5; 325 TABLET ORAL at 10:59

## 2024-08-25 RX ADMIN — DULOXETINE HYDROCHLORIDE 30 MG: 30 CAPSULE, DELAYED RELEASE ORAL at 08:36

## 2024-08-25 RX ADMIN — AMLODIPINE BESYLATE 2.5 MG: 5 TABLET ORAL at 12:45

## 2024-08-25 RX ADMIN — HYDROXYZINE HYDROCHLORIDE 25 MG: 25 TABLET ORAL at 23:41

## 2024-08-25 RX ADMIN — POTASSIUM CHLORIDE 40 MEQ: 750 TABLET, EXTENDED RELEASE ORAL at 12:45

## 2024-08-25 RX ADMIN — VANCOMYCIN HYDROCHLORIDE 750 MG: 750 INJECTION, POWDER, LYOPHILIZED, FOR SOLUTION INTRAVENOUS at 14:31

## 2024-08-25 RX ADMIN — PANTOPRAZOLE SODIUM 40 MG: 40 TABLET, DELAYED RELEASE ORAL at 05:10

## 2024-08-25 RX ADMIN — Medication 2.5 MG: at 20:56

## 2024-08-25 RX ADMIN — CEFAZOLIN 2000 MG: 2 INJECTION, POWDER, FOR SOLUTION INTRAVENOUS at 20:56

## 2024-08-25 RX ADMIN — INSULIN LISPRO 3 UNITS: 100 INJECTION, SOLUTION INTRAVENOUS; SUBCUTANEOUS at 17:18

## 2024-08-25 RX ADMIN — POTASSIUM CHLORIDE 40 MEQ: 750 TABLET, EXTENDED RELEASE ORAL at 08:33

## 2024-08-25 RX ADMIN — HEPARIN SODIUM 5000 UNITS: 5000 INJECTION INTRAVENOUS; SUBCUTANEOUS at 08:35

## 2024-08-25 RX ADMIN — FUROSEMIDE 40 MG: 10 INJECTION, SOLUTION INTRAMUSCULAR; INTRAVENOUS at 18:02

## 2024-08-25 RX ADMIN — INSULIN LISPRO 10 UNITS: 100 INJECTION, SOLUTION INTRAVENOUS; SUBCUTANEOUS at 08:35

## 2024-08-25 RX ADMIN — ACETAMINOPHEN 325MG 650 MG: 325 TABLET ORAL at 08:35

## 2024-08-25 RX ADMIN — LEVOTHYROXINE SODIUM 112 MCG: 112 TABLET ORAL at 05:10

## 2024-08-25 RX ADMIN — Medication 1 CAPSULE: at 00:20

## 2024-08-25 RX ADMIN — VANCOMYCIN HYDROCHLORIDE 1500 MG: 10 INJECTION, POWDER, LYOPHILIZED, FOR SOLUTION INTRAVENOUS at 00:25

## 2024-08-25 RX ADMIN — INSULIN LISPRO 3 UNITS: 100 INJECTION, SOLUTION INTRAVENOUS; SUBCUTANEOUS at 12:45

## 2024-08-25 RX ADMIN — INSULIN LISPRO 10 UNITS: 100 INJECTION, SOLUTION INTRAVENOUS; SUBCUTANEOUS at 12:44

## 2024-08-25 RX ADMIN — BISOPROLOL FUMARATE 5 MG: 5 TABLET, FILM COATED ORAL at 00:20

## 2024-08-25 RX ADMIN — Medication 1 CAPSULE: at 08:35

## 2024-08-25 RX ADMIN — HYDRALAZINE HYDROCHLORIDE 10 MG: 20 INJECTION INTRAMUSCULAR; INTRAVENOUS at 18:02

## 2024-08-25 RX ADMIN — CEFAZOLIN 2000 MG: 2 INJECTION, POWDER, FOR SOLUTION INTRAVENOUS at 10:54

## 2024-08-25 NOTE — H&P
[facility] Medicine Services  HISTORY AND PHYSICAL    Patient Name: Halie Guidry  : 1940  MRN: [id number]  Primary Care Physician: Napoleon Mead MD  Date of admission: 2024    Subjective   Subjective   Chief Complaint:  Leg swelling and pain    HPI:  Halie Guidry is a 84 y.o. female presents from assisted living by EMS with multiple bilateral lower leg wounds with progressive moderate erythema right leg greater than left leg that has been worsening over several days.  Patient reports they have been swollen for several months.  Patient reports she has been scratching on the wounds and they have some yellow crust like drainage.  Patient thinks she had been off her diuretics but is a somewhat poor historian.  She reports she sees nephrology but is uncertain if she is getting the right medicines.  Patient reports to me some shortness of breath with laying flat.      Review of Systems   No current fevers or chills  No current nausea, vomiting, or diarrhea  No current chest pain or palpitations    All other systems reviewed and are negative.     Personal History     Past Medical History:   Diagnosis Date   • Acute metabolic encephalopathy 2022   • Anxiety    • Arthritis    • Benign essential hypertension 2014   • Bleeding disorder    • Depression    • Diabetes    • Diabetes mellitus, type 2    • Disc degeneration, lumbar    • Headache, tension-type    • Hyperlipidemia    • Hypothyroidism    • Neuropathy    • Osteoporosis 2015   • Peripheral neuropathy    • Rotator cuff tear, left    • Scoliosis    • Shoulder pain     LEFT, TORN ROTATOR CUFF S/P FALL   • Spinal stenosis        Past Surgical History:   Procedure Laterality Date   • APPENDECTOMY     • BILATERAL BREAST REDUCTION Bilateral 2015   • CATARACT EXTRACTION  2015   • CHOLECYSTECTOMY WITH INTRAOPERATIVE CHOLANGIOGRAM N/A 3/27/2022    Procedure: CHOLECYSTECTOMY LAPAROSCOPIC INTRAOPERATIVE CHOLANGIOGRAM;   Surgeon: Aiyana Hill MD;  Location: [facility] MAIN OR;  Service: General;  Laterality: N/A;   • COLONOSCOPY  06/05/2015    WNL   • ERCP N/A 2/25/2022    Procedure: ENDOSCOPIC RETROGRADE CHOLANGIOPANCREATOGRAPHY with sphincterotomy and balloon sweep;  Surgeon: Chilo Wilhelm MD;  Location: [facility] ENDOSCOPY;  Service: Gastroenterology;  Laterality: N/A;  PRE/POST - CBD stones   • ERCP N/A 3/28/2022    Procedure: ENDOSCOPIC RETROGRADE CHOLANGIOPANCREATOGRAPHY WITH SPHINCTEROTOMY AND BALLOON SWEEP;  Surgeon: Chilo Wilhelm MD;  Location: [facility] ENDOSCOPY;  Service: Gastroenterology;  Laterality: N/A;  PRE: COMMON DUCT STONE  POST: COMMON DUCT STONE   • KYPHOPLASTY     • REDUCTION MAMMAPLASTY     • TONSILLECTOMY         Family History: family history includes Bone cancer in her mother; Heart disease in her daughter; No Known Problems in her father. Other pertinent FHx was reviewed and unremarkable.     Social History:  reports that she quit smoking about 11 years ago. Her smoking use included cigarettes. She started smoking about 51 years ago. She has a 40 pack-year smoking history. She has never used smokeless tobacco. She reports that she does not drink alcohol and does not use drugs.        Medications:  Available home medication information reviewed.    Allergies   Allergen Reactions   • Sulfa Antibiotics Unknown - High Severity     Pt says it was a long time ago and I don't remember but it wasn't good.        Objective   Objective   Vital Signs:   Temp:  [97.6 °F (36.4 °C)] 97.6 °F (36.4 °C)  Heart Rate:  [63-69] 65  Resp:  [16-18] 18  BP: (156-204)/(65-90) 180/67        Physical Exam   Constitutional: Awake, alert, elderly  Eyes: PERRLA, sclerae anicteric, no conjunctival injection  HENT: NCAT, mucous membranes moist  Neck: Supple, no thyromegaly, no lymphadenopathy, trachea midline  Respiratory: No cough or wheezes, normal inspiration, nonlabored respirations   Cardiovascular: Pulse rate is normal,  palpable radial pulses bilaterally  Gastrointestinal:  Soft, nontender, nondistended  Musculoskeletal: Somewhat frail and chronically debilitated in appearance, pitting bilateral lower extremity edema with bilateral erythema greater on the right leg than the left leg.  She has multiple leg wounds with yellow crust like drainage  Psychiatric: Mildly anxious affect, cooperative, conversational  Neurologic: Somewhat poor historian, no slurred speech or facial droop, follows commands  Skin: No rashes or jaundice, warm      Results from last 7 days   Lab Units 08/24/24  1733   WBC 10*3/mm3 10.35   HEMOGLOBIN g/dL 10.2*   HEMATOCRIT % 31.0*   PLATELETS 10*3/mm3 337     Results from last 7 days   Lab Units 08/24/24 2002 08/24/24  1733   SODIUM mmol/L  --  140   POTASSIUM mmol/L  --  3.6   CHLORIDE mmol/L  --  104   CO2 mmol/L  --  24.0   BUN mg/dL  --  20   CREATININE mg/dL  --  1.78*   GLUCOSE mg/dL  --  261*   CALCIUM mg/dL  --  8.8   PROBNP pg/mL  --  1,919.0*   LACTATE mmol/L 1.2  --      CrCl cannot be calculated (Unknown ideal weight.).  Brief Urine Lab Results  (Last result in the past 365 days)        Color   Clarity   Blood   Leuk Est   Nitrite   Protein   CREAT   Urine HCG        07/18/24 1843 Yellow   Clear   Trace   Negative   Negative   >=300 mg/dL (3+)                 Imaging Results (Last 24 Hours)       ** No results found for the last 24 hours. **          Results for orders placed during the hospital encounter of 06/22/22    Adult transthoracic echo complete    Interpretation Summary  · Left ventricular wall thickness is consistent with mild concentric hypertrophy.  · Calculated left ventricular EF = 68% Estimated left ventricular EF was in agreement with the calculated left ventricular EF. Left ventricular systolic function is normal.  · Normal right ventricular cavity size and systolic function noted.  · The left atrial cavity is mildly dilated  · Saline test results are negative.  · Mild mitral valve  regurgitation is present.  · There is no evidence of pericardial effusion      Assessment & Plan   Assessment & Plan     Active Hospital Problems    Diagnosis  POA   • **Cellulitis of right lower extremity [L03.115]  Yes   • Bilateral lower extremity edema [R60.0]  Yes   • CKD (chronic kidney disease) stage 4, GFR 15-29 ml/min [N18.4]  Yes   • Acute CHF [I50.9]  Yes   • Anemia due to chronic kidney disease [N18.9, D63.1]  Yes   • Primary malignant neuroendocrine tumor of pancreas [C7A.8]  Yes   • Peripheral edema [R60.0]  Yes   • Accelerated hypertension [I10]  Yes   • Generalized weakness [R53.1]  Yes   • Hyperlipidemia [E78.5]  Yes   • Type 2 diabetes mellitus with hyperglycemia, with long-term current use of insulin [E11.65, Z79.4]  Not Applicable   • Neuropathy [G62.9]  Yes   • Benign essential hypertension [I10]  Yes     84-year-old female presents the hospital with concern for leg cellulitis right greater than left with multiple leg wounds.    Cellulitis of the leg right greater than left with bilateral leg wounds:  Wound drainage has a yellow appearance concerning for possible impetigo.  Plan for vancomycin and Ancef coverage for skin mich.  No clear abscess.  Borderline white count with high neutrophils noted.  No current sepsis.  Monitor carefully.    Acute CHF, anasarca:  Consult nephrology who has manage patient's diuretics in the past.  Previous echocardiogram reviewed as per above from 2022 with normal EF.  Suspect diastolic CHF however check repeat echocardiogram.  proBNP is elevated.  Initiate IV diuresis.  Continue beta-blocker.    CKD 4:  Previous records reviewed and baseline creatinine 1.7-2.0.  Creatinine 1.78 upon admission.  Consult nephrology.  Renally dose medications.  Avoid nephrotoxins.    Anemia: Mild.  No bleeding reported.    Type 2 diabetes: A1c 7.9.  Monitor blood sugar and adjust insulin as needed.  Reportedly on 90 units nightly long-acting insulin at home.  Decreased to 60 units  initially to decrease risk of hypoglycemia.  Reportedly 12 units with meals dose also gently reduced while hospitalized.  Correction insulin    Essential hypertension: Continue appropriate home blood pressure medications.  Monitor blood pressure adjust as needed.    Neuropathy: Decrease Lyrica dose due to CKD.  Continue low-dose chronic hydrocodone.    Neuroendocrine tumor: Reportedly receives management currently with oncology.    Hyperlipidemia: Continue statin.  Stable.    DVT prophylaxis: Subcutaneous heparin    CODE STATUS:    Code Status and Medical Interventions: No CPR (Do Not Attempt to Resuscitate); Limited Support; No intubation (DNI)   Ordered at: 08/24/24 1921     Medical Intervention Limits:    No intubation (DNI)     Level Of Support Discussed With:    Patient     Code Status (Patient has no pulse and is not breathing):    No CPR (Do Not Attempt to Resuscitate)     Medical Interventions (Patient has pulse or is breathing):    Limited Support         Nolan Upton MD  08/24/24

## 2024-08-25 NOTE — THERAPY EVALUATION
Patient Name: Halie Guidry  : 1940    MRN: [id number]                              Today's Date: 2024       Admit Date: 2024    Visit Dx:     ICD-10-CM ICD-9-CM   1. Cellulitis of lower extremity, unspecified laterality  L03.119 682.6   2. Bilateral lower extremity edema  R60.0 782.3   3. Chronic kidney disease, unspecified CKD stage  N18.9 585.9   4. History of diabetes mellitus  Z86.39 V12.29     Patient Active Problem List   Diagnosis    Compression fracture    Lumbar degenerative disc disease    Type 2 diabetes mellitus with hyperglycemia, with long-term current use of insulin    Hyperlipidemia    Benign essential hypertension    Primary hypothyroidism    Neuropathy    Osteoporosis    Proteinuria    Tobacco abuse    Generalized weakness    Spinal stenosis    Scoliosis    Arthritis    VBI (vertebrobasilar insufficiency)    Orthostatic hypotension    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    Severe hypothyroidism    Noncompliance with medication regimen    Vitamin D deficiency disease    Tremor    Seizure    Thoracic degenerative disc disease    Hyperglycemia    Type II diabetes mellitus, uncontrolled    Lower abdominal pain    Transaminitis    Emphysematous cystitis    Choledocholithiasis    Hypokalemia    Hypoxia    Generalized abdominal pain    History of Clostridium difficile infection    History of ERCP    Hypomagnesemia    Anxiety disorder    Neuropathic pain    Intertrigo    Weakness of both lower extremities    Accelerated hypertension    Acute cystitis without hematuria    Left lower lobe pneumonia    Cellulitis and abscess of left lower extremity    Benign hypertension with CKD (chronic kidney disease) stage IV    Peripheral edema    Primary malignant neuroendocrine tumor of pancreas    Encounter for long-term (current) use of high-risk medication    Diabetic foot ulcer    Stage 3a chronic kidney disease    Pressure injury of buttock, stage 2    HTN  (hypertension)    Anemia due to chronic kidney disease    Bilateral lower extremity edema    Cellulitis of right lower extremity    CKD (chronic kidney disease) stage 4, GFR 15-29 ml/min    Acute CHF     Past Medical History:   Diagnosis Date    Acute metabolic encephalopathy 06/24/2022    Anxiety     Arthritis     Benign essential hypertension 08/20/2014    Bleeding disorder     Depression     Diabetes     Diabetes mellitus, type 2     Disc degeneration, lumbar     Headache, tension-type     Hyperlipidemia     Hypothyroidism     Neuropathy     Osteoporosis 09/09/2015    Pancreatic mass     Sept 2023, on Monthly Octreotide Injection    Peripheral neuropathy     Rotator cuff tear, left     Scoliosis     Shoulder pain     LEFT, TORN ROTATOR CUFF S/P FALL    Spinal stenosis      Past Surgical History:   Procedure Laterality Date    APPENDECTOMY      BILATERAL BREAST REDUCTION Bilateral 08/2015    CATARACT EXTRACTION  03/2015    CHOLECYSTECTOMY WITH INTRAOPERATIVE CHOLANGIOGRAM N/A 3/27/2022    Procedure: CHOLECYSTECTOMY LAPAROSCOPIC INTRAOPERATIVE CHOLANGIOGRAM;  Surgeon: Aiyana Hill MD;  Location: [facility] MAIN OR;  Service: General;  Laterality: N/A;    COLONOSCOPY  06/05/2015    WNL    ERCP N/A 2/25/2022    Procedure: ENDOSCOPIC RETROGRADE CHOLANGIOPANCREATOGRAPHY with sphincterotomy and balloon sweep;  Surgeon: Chilo Wilhelm MD;  Location: [facility] ENDOSCOPY;  Service: Gastroenterology;  Laterality: N/A;  PRE/POST - CBD stones    ERCP N/A 3/28/2022    Procedure: ENDOSCOPIC RETROGRADE CHOLANGIOPANCREATOGRAPHY WITH SPHINCTEROTOMY AND BALLOON SWEEP;  Surgeon: Chilo Wilhelm MD;  Location: [facility] ENDOSCOPY;  Service: Gastroenterology;  Laterality: N/A;  PRE: COMMON DUCT STONE  POST: COMMON DUCT STONE    KYPHOPLASTY      REDUCTION MAMMAPLASTY      TONSILLECTOMY        General Information       Row Name 08/25/24 1143          OT Time and Intention    Document Type evaluation  -MM     Mode of Treatment  occupational therapy;individual therapy  -       Row Name 08/25/24 1143          General Information    Patient Profile Reviewed yes  -MM     Prior Level of Function min assist:;ADL's;independent:;transfer  pt reports (I) with bed<> w/c transfer and onto commode, reports staff assist with bathing, but able to complete dressing and toileting mod (I)  -MM     Existing Precautions/Restrictions fall  -MM     Barriers to Rehab medically complex  -MM       Row Name 08/25/24 1143          Living Environment    People in Home facility resident  CL[place]  -MM       Row Name 08/25/24 1143          Cognition    Orientation Status (Cognition) oriented x 4  -MM       Row Name 08/25/24 1143          Safety Issues, Functional Mobility    Impairments Affecting Function (Mobility) balance;endurance/activity tolerance;strength;pain;postural/trunk control  -MM               User Key  (r) = Recorded By, (t) = Taken By, (c) = Cosigned By      Initials Name Provider Type    MM Arcelia Hoyos OT Occupational Therapist                     Mobility/ADL's       Row Name 08/25/24 1144          Bed Mobility    Bed Mobility supine-sit;sit-supine  -MM     Supine-Sit [place] (Bed Mobility) minimum assist (75% patient effort)  -MM     Sit-Supine [place] (Bed Mobility) moderate assist (50% patient effort)  -MM     Assistive Device (Bed Mobility) bed rails;head of bed elevated  -MM     Comment, (Bed Mobility) increased time to come to EOB, use of BUE to assist with scooting to EOB  -MM       Row Name 08/25/24 1144          Transfers    Transfers sit-stand transfer  -MM       Row Name 08/25/24 1144          Sit-Stand Transfer    Sit-Stand [place] (Transfers) moderate assist (50% patient effort);maximum assist (25% patient effort);1 person assist  -MM     Assistive Device (Sit-Stand Transfers) walker, front-wheeled  -MM     Comment, (Sit-Stand Transfer) x2 STS from elevated bed, on second stand, pt able to complete lateral steps with min A  x1 using rwx. very quick to fatigue  -MM       Row Name 08/25/24 1144          Activities of Daily Living    BADL Assessment/Intervention lower body dressing;toileting  -MM       Row Name 08/25/24 1144          Lower Body Dressing Assessment/Training    [place] Level (Lower Body Dressing) don;socks;moderate assist (50% patient effort)  -MM     Comment, (Lower Body Dressing) decreased functional reach  -MM       Row Name 08/25/24 1144          Toileting Assessment/Training    Comment, (Toileting) RN reports difficulty assistx2 yesterday when staff transferred pt onto [facility], lateral steps only complete today, too fatigued to attempt during session  -MM               User Key  (r) = Recorded By, (t) = Taken By, (c) = Cosigned By      Initials Name Provider Type    Arcelia Ahmadi OT Occupational Therapist                   Obj/Interventions       Row Name 08/25/24 1146          Sensory Assessment (Somatosensory)    Sensory Assessment (Somatosensory) UE sensation intact  -MM       [facility] Name 08/25/24 1146          Vision Assessment/Intervention    Visual Impairment/Limitations WFL  -MM       [facility] Name 08/25/24 1146          Range of Motion Comprehensive    General Range of Motion upper extremity range of motion deficits identified  -MM     Comment, General Range of Motion LUE previous RC tear, very limited, RUE WFL  -MM       Row Name 08/25/24 1146          Strength Comprehensive (MMT)    General Manual Muscle Testing (MMT) Assessment upper extremity strength deficits identified  -MM     Comment, General Manual Muscle Testing (MMT) Assessment RUE 3+/5, LUE 2/5  -MM       Row Name 08/25/24 1146          Motor Skills    Motor Skills functional endurance  -MM     Functional Endurance poor  -MM       Row Name 08/25/24 1146          Balance    Balance Assessment sitting static balance;sitting dynamic balance;standing static balance;sit to stand dynamic balance;standing dynamic balance  -MM     Static Sitting Balance  standby assist  -MM     Dynamic Sitting Balance standby assist;contact guard  -MM     Position, Sitting Balance sitting edge of bed;unsupported  -MM     Sit to Stand Dynamic Balance moderate assist;maximum assist  -MM     Static Standing Balance minimal assist  -MM     Dynamic Standing Balance minimal assist  -MM     Position/Device Used, Standing Balance supported;walker, front-wheeled  -MM     Balance Interventions sitting;standing;sit to stand;supported;static;dynamic;moderate challenge;weight shifting activity  -MM     Comment, Balance poor flexed posture onto rwx, quick to fatigue, increased pain reported in BLE with standing activity  -MM               User Key  (r) = Recorded By, (t) = Taken By, (c) = Cosigned By      Initials Name Provider Type    Arcelia Ahmadi OT Occupational Therapist                   Goals/Plan       Row Name 08/25/24 1151          Transfer Goal 1 (OT)    Activity/Assistive Device (Transfer Goal 1, OT) sit-to-stand/stand-to-sit;bed-to-chair/chair-to-bed;toilet  -MM     [place] Level/Cues Needed (Transfer Goal 1, OT) minimum assist (75% or more patient effort)  -MM     Time Frame (Transfer Goal 1, OT) short term goal (STG);2 weeks  -MM     Progress/Outcome (Transfer Goal 1, OT) goal ongoing  -MM       Row Name 08/25/24 1151          Bathing Goal 1 (OT)    Activity/Device (Bathing Goal 1, OT) bathing skills, all  -MM     [place] Level/Cues Needed (Bathing Goal 1, OT) minimum assist (75% or more patient effort)  -MM     Time Frame (Bathing Goal 1, OT) short term goal (STG);2 weeks  -MM     Progress/Outcomes (Bathing Goal 1, OT) goal ongoing  -MM       Row Name 08/25/24 1151          Dressing Goal 1 (OT)    Activity/Device (Dressing Goal 1, OT) lower body dressing  -MM     [place]/Cues Needed (Dressing Goal 1, OT) minimum assist (75% or more patient effort)  -MM     Time Frame (Dressing Goal 1, OT) short term goal (STG);2 weeks  -MM     Progress/Outcome (Dressing Goal 1,  OT) goal ongoing  -MM       Row Name 08/25/24 1151          Toileting Goal 1 (OT)    Activity/Device (Toileting Goal 1, OT) toileting skills, all  -MM     [place] Level/Cues Needed (Toileting Goal 1, OT) minimum assist (75% or more patient effort)  -MM     Time Frame (Toileting Goal 1, OT) short term goal (STG);2 weeks  -MM     Progress/Outcome (Toileting Goal 1, OT) goal ongoing  -MM       Row Name 08/25/24 1151          Grooming Goal 1 (OT)    Activity/Device (Grooming Goal 1, OT) grooming skills, all  -MM     [place] (Grooming Goal 1, OT) contact guard required  -MM     Time Frame (Grooming Goal 1, OT) short term goal (STG);2 weeks  -MM     Strategies/Barriers (Grooming Goal 1, OT) standing  -MM     Progress/Outcome (Grooming Goal 1, OT) goal ongoing  -MM       Row Name 08/25/24 1151          Therapy Assessment/Plan (OT)    Planned Therapy Interventions (OT) BADL retraining;activity tolerance training;neuromuscular control/coordination retraining;patient/caregiver education/training;transfer/mobility retraining;strengthening exercise;ROM/therapeutic exercise;occupation/activity based interventions;functional balance retraining  -MM               User Key  (r) = Recorded By, (t) = Taken By, (c) = Cosigned By      Initials Name Provider Type    Arcelia Ahmadi OT Occupational Therapist                   Clinical Impression       Row Name 08/25/24 1140          Pain Assessment    Pretreatment Pain Rating 6/10  -MM     Posttreatment Pain Rating 6/10  -MM     Pain Location - Side/Orientation Bilateral  -MM     Pain Location lower  -MM     Pain Location - extremity  -MM       Row Name 08/25/24 1144          Plan of Care Review    Plan of Care Reviewed With patient  -MM     Progress no change  -MM     Outcome Evaluation Pt is an 83 yo female admitted with c/o bilateral leg swelling and weeping. Dx cellulitis of BLE, edema. She is admitted from [facility] where she resides, she reports she has been able to transfer  to w/c and BR mod (I) with w/c, self propels w/c (I), requires assist from staff for bathing, however dressing and toileting completed with mod (I). She presents below her baseline this date due to increased pain in BLE impacting overall balance, activity tolerance and (I) with ADLs and functional transfers. She requires min-mod A for bed mobility, mod A for LBD task, STS with mod-max A from elevated bed. Able to demo lateral steps on second standing trial with min A x1, however only able to demo couple before fatigue. She demo'd poor posture with static standing. She feels she is below her baseline, she reports she does not want to go to another rehab as she has been in and out of different rehabs since beginning of this year. At current presentation, rec SNF. will update pending progress.  -MM       Row Name 08/25/24 1147          Therapy Assessment/Plan (OT)    Rehab Potential (OT) good, to achieve stated therapy goals  -MM     Criteria for Skilled Therapeutic Interventions Met (OT) yes;meets criteria;skilled treatment is necessary  -MM     Therapy Frequency (OT) 5 times/wk  -MM       Row Name 08/25/24 1147          Therapy Plan Review/Discharge Plan (OT)    Anticipated Discharge Disposition (OT) skilled nursing facility  -MM       Row Name 08/25/24 1147          Vital Signs    O2 Delivery Pre Treatment room air  -MM       Row Name 08/25/24 1147          Positioning and Restraints    Pre-Treatment Position in bed  -MM     Post Treatment Position bed  -MM     In Bed notified nsg;fowlers;encouraged to call for assist;call light within reach;exit alarm on;side rails up x3  -MM               User Key  (r) = Recorded By, (t) = Taken By, (c) = Cosigned By      Initials Name Provider Type    MM Arcelia Hoyos, [place] Occupational Therapist                   Outcome Measures       Row Name 08/25/24 1152          How much help from another is currently needed...    Putting on and taking off regular lower body clothing? 2  -MM      Bathing (including washing, rinsing, and drying) 2  -MM     Toileting (which includes using toilet bed pan or urinal) 2  -MM     Putting on and taking off regular upper body clothing 3  -MM     Taking care of personal grooming (such as brushing teeth) 3  -MM     Eating meals 4  -MM     AM-PAC 6 Clicks Score (OT) 16  -MM       Row Name 08/25/24 0833          How much help from another person do you currently need...    Turning from your back to your side while in flat bed without using bedrails? 3  -MA     Moving from lying on back to sitting on the side of a flat bed without bedrails? 4  -MA     Moving to and from a bed to a chair (including a wheelchair)? 3  -MA     Standing up from a chair using your arms (e.g., wheelchair, bedside chair)? 2  -MA     Climbing 3-5 steps with a railing? 2  -MA     To walk in hospital room? 2  -MA     AM-PAC 6 Clicks Score (PT) 16  -MA     Highest Level of Mobility Goal 5 --> Static standing  -MA       Row Name 08/25/24 1152          Functional Assessment    Outcome Measure Options AM-PAC 6 Clicks Daily Activity (OT)  -MM               User Key  (r) = Recorded By, (t) = Taken By, (c) = Cosigned By      Initials Name Provider Type    Arielle Roblero RN Registered Nurse    Arcelia Ahmadi OT Occupational Therapist                    Occupational Therapy Education       Title: PT OT SLP Therapies (Done)       Topic: Occupational Therapy (Done)       Point: ADL training (Done)       Description:   Instruct learner(s) on proper safety adaptation and remediation techniques during self care or transfers.   Instruct in proper use of assistive devices.                  Learning Progress Summary             Patient Acceptance, E, VU by AUGUSTUS at 8/25/2024 1152    Comment: role of OT, d/c rec                         Point: Precautions (Done)       Description:   Instruct learner(s) on prescribed precautions during self-care and functional transfers.                  Learning Progress  Summary             Patient Acceptance, E, VU by MM at 8/25/2024 1152    Comment: role of OT, d/c rec                         Point: Body mechanics (Done)       Description:   Instruct learner(s) on proper positioning and spine alignment during self-care, functional mobility activities and/or exercises.                  Learning Progress Summary             Patient Acceptance, E, VU by MM at 8/25/2024 1152    Comment: role of OT, d/c rec                                         User Key       Initials Effective Dates Name Provider Type Discipline    AUGUSTUS 05/31/24 -  Arcelia Hoyos OT Occupational Therapist OT                  OT Recommendation and Plan  Planned Therapy Interventions (OT): BADL retraining, activity tolerance training, neuromuscular control/coordination retraining, patient/caregiver education/training, transfer/mobility retraining, strengthening exercise, ROM/therapeutic exercise, occupation/activity based interventions, functional balance retraining  Therapy Frequency (OT): 5 times/wk  Plan of Care Review  Plan of Care Reviewed With: patient  Progress: no change  Outcome Evaluation: Pt is an 83 yo female admitted with c/o bilateral leg swelling and weeping. Dx cellulitis of BLE, edema. She is admitted from [facility] where she resides, she reports she has been able to transfer to w/c and BR mod (I) with w/c, self propels w/c (I), requires assist from staff for bathing, however dressing and toileting completed with mod (I). She presents below her baseline this date due to increased pain in BLE impacting overall balance, activity tolerance and (I) with ADLs and functional transfers. She requires min-mod A for bed mobility, mod A for LBD task, STS with mod-max A from elevated bed. Able to demo lateral steps on second standing trial with min A x1, however only able to demo couple before fatigue. She demo'd poor posture with static standing. She feels she is below her baseline, she reports she does not want to go  to another rehab as she has been in and out of different rehabs since beginning of this year. At current presentation, rec SNF. will update pending progress.     Time Calculation:   Evaluation Complexity (OT)  Review Occupational Profile/Medical/Therapy History Complexity: expanded/moderate complexity  Assessment, Occupational Performance/Identification of Deficit Complexity: 3-5 performance deficits  Clinical Decision Making Complexity (OT): detailed assessment/moderate complexity  Overall Complexity of Evaluation (OT): moderate complexity     Time Calculation- OT       Row Name 08/25/24 1152             Time Calculation- OT    OT Start Time 0958  -MM      OT Stop Time 1019  -MM      OT Time Calculation (min) 21 min  -MM      Total Timed Code Minutes- OT 13 minute(s)  -MM      OT Received On 08/25/24  -MM      OT - Next Appointment 08/26/24  -MM      OT Goal Re-Cert Due Date 09/08/24  -MM         Timed Charges    60468 - OT Therapeutic Activity Minutes 13  -MM         Untimed Charges    OT Eval/Re-eval Minutes 8  -MM         Total Minutes    Timed Charges Total Minutes 13  -MM      Untimed Charges Total Minutes 8  -MM       Total Minutes 21  -MM                User Key  (r) = Recorded By, (t) = Taken By, (c) = Cosigned By      Initials Name Provider Type    MM Arcelia Hoyos OT Occupational Therapist                  Therapy Charges for Today       Code Description Service Date Service Provider Modifiers Qty    86576852491  OT THERAPEUTIC ACT EA 15 MIN 8/25/2024 Arcelia Hoyos OT GO 1    [id number] HC OT EVAL MOD COMPLEXITY 2 8/25/2024 Arcelia Hoyos OT GO 1                 Arcelia Hoyos OT  8/25/2024

## 2024-08-25 NOTE — PLAN OF CARE
Goal Outcome Evaluation:  Plan of Care Reviewed With: patient        Progress: no change  Outcome Evaluation: Pt is an 85 yo female admitted with c/o bilateral leg swelling and weeping. Dx cellulitis of BLE, edema. She is admitted from [facility] where she resides, she reports she has been able to transfer to w/c and BR mod (I) with w/c, self propels w/c (I), requires assist from staff for bathing, however dressing and toileting completed with mod (I). She presents below her baseline this date due to increased pain in BLE impacting overall balance, activity tolerance and (I) with ADLs and functional transfers. She requires min-mod A for bed mobility, mod A for LBD task, STS with mod-max A from elevated bed. Able to demo lateral steps on second standing trial with min A x1, however only able to demo couple before fatigue. She demo'd poor posture with static standing. She feels she is below her baseline, she reports she does not want to go to another rehab as she has been in and out of different rehabs since beginning of this year. At current presentation, rec SNF. will update pending progress.      Anticipated Discharge Disposition (OT): skilled nursing facility

## 2024-08-25 NOTE — PLAN OF CARE
Goal Outcome Evaluation:  Plan of Care Reviewed With: patient           Outcome Evaluation: Alert and oriented, received from ED with redness, edema of BLE with multiple open wounds with Bactroban cream, slight oozing, dressing applied with Telfa and Kerlix. Redness, rash under breasts, bilateral groin and perineal area, Stage 1 PU at coccyx area, Z guard cream applied. Mepilex applied on both heels. Feet are dry with scabs. BLE kept elevated. Turned to sides. Incontinent at times, assisted to BSC with difficulty with assistX2. External catheter applied with good urine output. Norco given for pain.  IV Fluids at KVO rate for IV Cefazolin and IV Vancomycin. BSG monitored, due Insulin given, snacks taken. DEXCOM glucose monitoring device  at left abdomen. For Neprology consult today. Slept on and off.

## 2024-08-25 NOTE — THERAPY EVALUATION
Patient Name: Halie Guidry  : 1940    MRN: [id number]                              Today's Date: 2024       Admit Date: 2024    Visit Dx:     ICD-10-CM ICD-9-CM   1. Cellulitis of lower extremity, unspecified laterality  L03.119 682.6   2. Bilateral lower extremity edema  R60.0 782.3   3. Chronic kidney disease, unspecified CKD stage  N18.9 585.9   4. History of diabetes mellitus  Z86.39 V12.29     Patient Active Problem List   Diagnosis    Compression fracture    Lumbar degenerative disc disease    Type 2 diabetes mellitus with hyperglycemia, with long-term current use of insulin    Hyperlipidemia    Benign essential hypertension    Primary hypothyroidism    Neuropathy    Osteoporosis    Proteinuria    Tobacco abuse    Generalized weakness    Spinal stenosis    Scoliosis    Arthritis    VBI (vertebrobasilar insufficiency)    Orthostatic hypotension    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    Severe hypothyroidism    Noncompliance with medication regimen    Vitamin D deficiency disease    Tremor    Seizure    Thoracic degenerative disc disease    Hyperglycemia    Type II diabetes mellitus, uncontrolled    Lower abdominal pain    Transaminitis    Emphysematous cystitis    Choledocholithiasis    Hypokalemia    Hypoxia    Generalized abdominal pain    History of Clostridium difficile infection    History of ERCP    Hypomagnesemia    Anxiety disorder    Neuropathic pain    Intertrigo    Weakness of both lower extremities    Accelerated hypertension    Acute cystitis without hematuria    Left lower lobe pneumonia    Cellulitis and abscess of left lower extremity    Benign hypertension with CKD (chronic kidney disease) stage IV    Peripheral edema    Primary malignant neuroendocrine tumor of pancreas    Encounter for long-term (current) use of high-risk medication    Diabetic foot ulcer    Stage 3a chronic kidney disease    Pressure injury of buttock, stage 2    HTN  (hypertension)    Anemia due to chronic kidney disease    Bilateral lower extremity edema    Cellulitis of right lower extremity    CKD (chronic kidney disease) stage 4, GFR 15-29 ml/min    Acute CHF     Past Medical History:   Diagnosis Date    Acute metabolic encephalopathy 06/24/2022    Anxiety     Arthritis     Benign essential hypertension 08/20/2014    Bleeding disorder     Depression     Diabetes     Diabetes mellitus, type 2     Disc degeneration, lumbar     Headache, tension-type     Hyperlipidemia     Hypothyroidism     Neuropathy     Osteoporosis 09/09/2015    Pancreatic mass     Sept 2023, on Monthly Octreotide Injection    Peripheral neuropathy     Rotator cuff tear, left     Scoliosis     Shoulder pain     LEFT, TORN ROTATOR CUFF S/P FALL    Spinal stenosis      Past Surgical History:   Procedure Laterality Date    APPENDECTOMY      BILATERAL BREAST REDUCTION Bilateral 08/2015    CATARACT EXTRACTION  03/2015    CHOLECYSTECTOMY WITH INTRAOPERATIVE CHOLANGIOGRAM N/A 3/27/2022    Procedure: CHOLECYSTECTOMY LAPAROSCOPIC INTRAOPERATIVE CHOLANGIOGRAM;  Surgeon: Aiyana Hill MD;  Location: [facility] MAIN OR;  Service: General;  Laterality: N/A;    COLONOSCOPY  06/05/2015    WNL    ERCP N/A 2/25/2022    Procedure: ENDOSCOPIC RETROGRADE CHOLANGIOPANCREATOGRAPHY with sphincterotomy and balloon sweep;  Surgeon: Chilo Wilhelm MD;  Location: [facility] ENDOSCOPY;  Service: Gastroenterology;  Laterality: N/A;  PRE/POST - CBD stones    ERCP N/A 3/28/2022    Procedure: ENDOSCOPIC RETROGRADE CHOLANGIOPANCREATOGRAPHY WITH SPHINCTEROTOMY AND BALLOON SWEEP;  Surgeon: Chilo Wilhelm MD;  Location: [facility] ENDOSCOPY;  Service: Gastroenterology;  Laterality: N/A;  PRE: COMMON DUCT STONE  POST: COMMON DUCT STONE    KYPHOPLASTY      REDUCTION MAMMAPLASTY      TONSILLECTOMY        General Information       Row Name 08/25/24 1420          Physical Therapy Time and Intention    Document Type evaluation  -JL     Mode of  Treatment individual therapy;physical therapy  -       Row Name 08/25/24 1420          General Information    Patient Profile Reviewed yes  -JL     Prior Level of Function min assist:;ADL's;independent:;transfer  pt reports (I) with bed<> w/c transfer and onto commode, reports staff assist with bathing  -     Existing Precautions/Restrictions fall  -     Barriers to Rehab medically complex;previous functional deficit  -       Row Name 08/25/24 1420          Living Environment    People in Home facility resident  -[facility] Name 08/25/24 1420          Cognition    Orientation Status (Cognition) oriented x 4  -       Row Name 08/25/24 1420          Safety Issues, Functional Mobility    Impairments Affecting Function (Mobility) balance;endurance/activity tolerance;pain;strength  -               User Key  (r) = Recorded By, (t) = Taken By, (c) = Cosigned By      Initials Name Provider Type    Kenya Johnston, PT Physical Therapist                   Mobility       Row Name 08/25/24 1421          Bed Mobility    Bed Mobility supine-sit  -     Supine-Sit [place] (Bed Mobility) minimum assist (75% patient effort)  -     Assistive Device (Bed Mobility) bed rails;head of bed elevated  -[facility] Name 08/25/24 1421          Bed-Chair Transfer    Bed-Chair [place] (Transfers) moderate assist (50% patient effort);1 person assist  -     Assistive Device (Bed-Chair Transfers) walker, front-wheeled  -[facility] Name 08/25/24 1421          Sit-Stand Transfer    Sit-Stand [place] (Transfers) moderate assist (50% patient effort);1 person assist;verbal cues  -     Assistive Device (Sit-Stand Transfers) walker, front-wheeled  -     Comment, (Sit-Stand Transfer) Required elevated bed to perform STS and VCs for hand placement. Able to complete lateral steps using rwx with very flexed forward posture  -       Row Name 08/25/24 1421          Gait/Stairs (Locomotion)    Patient was able to  Ambulate no, other medical factors prevent ambulation  -JL     Reason Patient was unable to Ambulate Excessive Weakness  -JL     Bilateral Gait Deviations forward flexed posture;weight shift ability decreased  -JL               User Key  (r) = Recorded By, (t) = Taken By, (c) = Cosigned By      Initials Name Provider Type    Kenya Johnston PT Physical Therapist                   Obj/Interventions       Row Name 08/25/24 1423          Range of Motion Comprehensive    General Range of Motion bilateral lower extremity ROM WFL  -JL       Row Name 08/25/24 1423          Strength Comprehensive (MMT)    General Manual Muscle Testing (MMT) Assessment lower extremity strength deficits identified  -JL     Comment, General Manual Muscle Testing (MMT) Assessment BLE was grossly 3/5 MMT  -JL       Row Name 08/25/24 1423          Balance    Balance Assessment sitting static balance;standing static balance;sit to stand dynamic balance  -JL     Static Sitting Balance standby assist  -JL     Sit to Stand Dynamic Balance moderate assist  Elevated bed  -JL     Static Standing Balance minimal assist;moderate assist;1-person assist  -JL               User Key  (r) = Recorded By, (t) = Taken By, (c) = Cosigned By      Initials Name Provider Type    Kenya Johnston PT Physical Therapist                   Goals/Plan       Row Name 08/25/24 1433          Bed Mobility Goal 1 (PT)    Activity/Assistive Device (Bed Mobility Goal 1, PT) bed mobility activities, all  -JL     [place] Level/Cues Needed (Bed Mobility Goal 1, PT) independent  -JL     Time Frame (Bed Mobility Goal 1, PT) short term goal (STG);1 week  -JL     Progress/Outcomes (Bed Mobility Goal 1, PT) new goal  -       Row Name 08/25/24 1433          Transfer Goal 1 (PT)    Activity/Assistive Device (Transfer Goal 1, PT) sit-to-stand/stand-to-sit;bed-to-chair/chair-to-bed;walker, rolling  -JL     [place] Level/Cues Needed (Transfer Goal 1, PT) minimum assist (75%  or more patient effort)  -JL     Time Frame (Transfer Goal 1, PT) short term goal (STG);1 week  -JL     Progress/Outcome (Transfer Goal 1, PT) new goal  -JL       Row Name 08/25/24 1433          Gait Training Goal 1 (PT)    Activity/Assistive Device (Gait Training Goal 1, PT) gait (walking locomotion);assistive device use;decrease fall risk;walker, rolling  -JL     [place] Level (Gait Training Goal 1, PT) minimum assist (75% or more patient effort)  -JL     Distance (Gait Training Goal 1, PT) 25  -JL     Time Frame (Gait Training Goal 1, PT) short term goal (STG);1 week  -JL     Progress/Outcome (Gait Training Goal 1, PT) new goal  -JL       Row Name 08/25/24 1432          Therapy Assessment/Plan (PT)    Planned Therapy Interventions (PT) balance training;bed mobility training;gait training;strengthening;patient/family education;transfer training  -JL               User Key  (r) = Recorded By, (t) = Taken By, (c) = Cosigned By      Initials Name Provider Type    Kenya Johnston, PT Physical Therapist                   Clinical Impression       Row Name 08/25/24 1423          Pain    Pre/Posttreatment Pain Comment No numerical value was given but pt does report having pain  -JL       Row Name 08/25/24 1422          Plan of Care Review    Plan of Care Reviewed With patient  -JL     Outcome Evaluation Pt is an 83 yo female admitted with c/o bilateral leg swelling and weeping. Dx cellulitis of BLE, edema. She was A&Ox4 when PT arrived. She is admitted from [facility] where she resides. She reports she moved to the [facility] recently and prior to moving there she used to use a walker, but due to the hallway distance started using a w/c and does not leave the W/C often. She is I with transfer from w/c to BR and self propels, but requires assistance for bathing, dressing, and sometimes toileting. She was able to transfer supine to sitting EOB with min A and maintain EOB sitting independently. Pt initially attempted STS but was  unable to. The bed was raised higher and pt was then able to complete STS with mod A x1. Pt had forward flexed position due to back pain and fatigues quickly. She was able to side step to the chair with VCs. Pt transferred into the chair. All needs were met before PT left. Patient will benefit from skilled PT services. Discharge recommendations are SNF.  -       Row Name 08/25/24 1424          Therapy Assessment/Plan (PT)    Rehab Potential (PT) good, to achieve stated therapy goals  -     Criteria for Skilled Interventions Met (PT) yes;meets criteria;skilled treatment is necessary  -     Therapy Frequency (PT) 5 times/wk  -       Row Name 08/25/24 1424          Positioning and Restraints    Pre-Treatment Position in bed  -JL     Post Treatment Position chair  -JL     In Chair reclined;notified nsg;call light within reach;encouraged to call for assist;exit alarm on  -               User Key  (r) = Recorded By, (t) = Taken By, (c) = Cosigned By      Initials Name Provider Type    Kenya Johnston, PT Physical Therapist                   Outcome Measures       Row Name 08/25/24 1434 08/25/24 0833       How much help from another person do you currently need...    Turning from your back to your side while in flat bed without using bedrails? 3  -JL 3  -MA    Moving from lying on back to sitting on the side of a flat bed without bedrails? 4  -JL 4  -MA    Moving to and from a bed to a chair (including a wheelchair)? 3  -JL 3  -MA    Standing up from a chair using your arms (e.g., wheelchair, bedside chair)? 2  -JL 2  -MA    Climbing 3-5 steps with a railing? 1  -JL 2  -MA    To walk in hospital room? 2  -JL 2  -MA    AM-PAC 6 Clicks Score (PT) 15  -JL 16  -MA    Highest Level of Mobility Goal 4 --> Transfer to chair/commode  - 5 --> Static standing  -MA      Row Name 08/25/24 1152          Functional Assessment    Outcome Measure Options AM-PAC 6 Clicks Daily Activity (OT)  -MM               User Key  (r) =  Recorded By, (t) = Taken By, (c) = Cosigned By      Initials Name Provider Type    Arielle Roblero, RN Registered Nurse    Arcelia Ahmadi OT Occupational Therapist    Kenya Johnston PT Physical Therapist                                 Physical Therapy Education       Title: PT OT SLP Therapies (In Progress)       Topic: Physical Therapy (In Progress)       Point: Mobility training (Done)       Learning Progress Summary             Patient Acceptance, E, VU by ESPINOZA at 8/25/2024 1435                         Point: Home exercise program (Not Started)       Learner Progress:  Not documented in this visit.              Point: Body mechanics (Done)       Learning Progress Summary             Patient Acceptance, E, VU by ESPINOZA at 8/25/2024 1435                         Point: Precautions (Done)       Learning Progress Summary             Patient Acceptance, E, VU by ESPINOZA at 8/25/2024 1435                                         User Key       Initials Effective Dates Name Provider Type Discipline    ESPINOZA 01/16/24 -  Kenya Ruiz PT Physical Therapist PT                  PT Recommendation and Plan  Planned Therapy Interventions (PT): balance training, bed mobility training, gait training, strengthening, patient/family education, transfer training  Plan of Care Reviewed With: patient  Outcome Evaluation: Pt is an 85 yo female admitted with c/o bilateral leg swelling and weeping. Dx cellulitis of BLE, edema. She was A&Ox4 when PT arrived. She is admitted from [facility] where she resides. She reports she moved to the [facility] recently and prior to moving there she used to use a walker, but due to the hallway distance started using a w/c and does not leave the W/C often. She is I with transfer from w/c to BR and self propels, but requires assistance for bathing, dressing, and sometimes toileting. She was able to transfer supine to sitting EOB with min A and maintain EOB sitting independently. Pt initially attempted STS but was  unable to. The bed was raised higher and pt was then able to complete STS with mod A x1. Pt had forward flexed position due to back pain and fatigues quickly. She was able to side step to the chair with VCs. Pt transferred into the chair. All needs were met before PT left. Patient will benefit from skilled PT services. Discharge recommendations are SNF.     Time Calculation:         PT Charges       Row Name 08/25/24 1436             Time Calculation    Start Time 1319  -JL      Stop Time 1340  -JL      Time Calculation (min) 21 min  -JL      PT Non-Billable Time (min) 10 min  -JL      PT Received On 08/25/24  -JL      PT - Next Appointment 08/26/24  -ESPINOZA      PT Goal Re-Cert Due Date 09/01/24  -JL         Time Calculation- PT    Total Timed Code Minutes- PT 11 minute(s)  -JL         Timed Charges    44909 - PT Therapeutic Activity Minutes 11  -JL         Untimed Charges    PT Eval/Re-eval Minutes 10  -JL         Total Minutes    Timed Charges Total Minutes 11  -JL      Untimed Charges Total Minutes 10  -JL       Total Minutes 21  -JL                User Key  (r) = Recorded By, (t) = Taken By, (c) = Cosigned By      Initials Name Provider Type    Kenya Johnston, MELODIE Physical Therapist                  Therapy Charges for Today       Code Description Service Date Service Provider Modifiers Qty    08836832429 HC PT THERAPEUTIC ACT EA 15 MIN 8/25/2024 Kenya Ruiz, PT GP 1    83361523735 HC PT EVAL MOD COMPLEXITY 3 8/25/2024 Kenya Ruiz PT GP 1            PT G-Codes  Outcome Measure Options: AM-PAC 6 Clicks Daily Activity (OT)  AM-PAC 6 Clicks Score (PT): 15  AM-PAC 6 Clicks Score (OT): 16  PT Discharge Summary  Anticipated Discharge Disposition (PT): skilled nursing facility    Kenya Ruiz PT  8/25/2024

## 2024-08-25 NOTE — PROGRESS NOTES
"[facility] Clinical Pharmacy Services: Vancomycin Pharmacokinetic Initial Consult Note    Halie Guidry is a 84 y.o. female who is on day 1 of pharmacy to dose vancomycin.    Indication: Skin and Soft Tissue  Consulting Provider: Dr. Upton  Planned Duration of Therapy: 7 days  Loading Dose Ordered or Given: 1500 mg on 8/25 at 0000  Culture/Source:   8/24 blood cultures in process  Target: -600 mg/L.hr   Other Antimicrobials: cefazolin 2 g iv every 8 hours    Vitals/Labs  Ht: 172.7 cm (68\"); Wt: 93.3 kg (205 lb 11 oz)  Temp Readings from Last 1 Encounters:   08/24/24 97.1 °F (36.2 °C) (Oral)    Estimated Creatinine Clearance: 28.1 mL/min (A) (by C-G formula based on SCr of 1.78 mg/dL (H)).     Results from last 7 days   Lab Units 08/24/24  1733 08/19/24  1415   CREATININE mg/dL 1.78*  --    WBC 10*3/mm3 10.35 12.49*     Assessment/Plan:    Vancomycin Dose:   750 mg IV every  24 hours  Predictive AUC level for the dose ordered is 438 mg/L.hr, which is within the target of 400-600 mg/L.hr  Vanc Trough has been ordered for 8/26 at 1130     Pharmacy will follow patient's kidney function and will adjust doses and obtain levels as necessary. Thank you for involving pharmacy in this patient's care. Please contact pharmacy with any questions or concerns.                           Calvin Tate III, [facility]  Clinical Pharmacist    "

## 2024-08-25 NOTE — CONSULTS
[facility] Consult Note      Patient Name: Halie Guidry  : 1940  MRN: [id number]  Primary Care Physician:  Napoleon Mead MD  Referring Physician: Ziggy Prince MD  Date of admission: 2024    Subjective     Reason for Consult:  CKD 4     HPI:   Halie Guidry is a 84 y.o. female with CKD stage 4, HTN, dCHF, DM2 who presented yesterday for BLE wounds and swelling, found to have RLE cellulitis.  Cr stable 1.8, with BL ~ 2 mg/dL, having recently re-established are in our office last month (used to see Dr Zhou).  She has been started on vancomycin and cefazolin, along with IV lasix. Takes low dose lasix at home.  K down to 3.1 today but was replaced up to 4.1.  A1c elevated to 7.9.  proBNP ~ 1900.  Does not have CXR.  BP been quite high and antihypertensive regimen includes low dose CCB.  Most recent /92.  She denies dyspnea, nausea, diarrhea, dysuria.  Echo in 2022 showed EF 68%.  Repeat study pending.  Renal US in April showed bilat renal cysts and no hydronephrosis.  She also has hx pancreatic endocrine tumor.    Review of Systems:   14 point review of systems is otherwise negative except for mentioned above on HPI    Personal History     Past Medical History:   Diagnosis Date   • Acute metabolic encephalopathy 2022   • Anxiety    • Arthritis    • Benign essential hypertension 2014   • Bleeding disorder    • Depression    • Diabetes    • Diabetes mellitus, type 2    • Disc degeneration, lumbar    • Headache, tension-type    • Hyperlipidemia    • Hypothyroidism    • Neuropathy    • Osteoporosis 2015   • Pancreatic mass     2023, on Monthly Octreotide Injection   • Peripheral neuropathy    • Rotator cuff tear, left    • Scoliosis    • Shoulder pain     LEFT, TORN ROTATOR CUFF S/P FALL   • Spinal stenosis        Past Surgical History:   Procedure Laterality Date   • APPENDECTOMY     • BILATERAL BREAST REDUCTION Bilateral 2015    • CATARACT EXTRACTION  03/2015   • CHOLECYSTECTOMY WITH INTRAOPERATIVE CHOLANGIOGRAM N/A 3/27/2022    Procedure: CHOLECYSTECTOMY LAPAROSCOPIC INTRAOPERATIVE CHOLANGIOGRAM;  Surgeon: Aiyana Hill MD;  Location: [facility] OR;  Service: General;  Laterality: N/A;   • COLONOSCOPY  06/05/2015    WNL   • ERCP N/A 2/25/2022    Procedure: ENDOSCOPIC RETROGRADE CHOLANGIOPANCREATOGRAPHY with sphincterotomy and balloon sweep;  Surgeon: Chilo Wilhelm MD;  Location: [facility] ENDOSCOPY;  Service: Gastroenterology;  Laterality: N/A;  PRE/POST - CBD stones   • ERCP N/A 3/28/2022    Procedure: ENDOSCOPIC RETROGRADE CHOLANGIOPANCREATOGRAPHY WITH SPHINCTEROTOMY AND BALLOON SWEEP;  Surgeon: Chilo Wilhelm MD;  Location: [facility] ENDOSCOPY;  Service: Gastroenterology;  Laterality: N/A;  PRE: COMMON DUCT STONE  POST: COMMON DUCT STONE   • KYPHOPLASTY     • REDUCTION MAMMAPLASTY     • TONSILLECTOMY         Family History: family history includes Bone cancer in her mother; Heart disease in her daughter; No Known Problems in her father.    Social History:  reports that she quit smoking about 11 years ago. Her smoking use included cigarettes. She started smoking about 51 years ago. She has a 40 pack-year smoking history. She has never used smokeless tobacco. She reports that she does not drink alcohol and does not use drugs.    Home Medications:  Prior to Admission medications    Medication Sig Start Date End Date Taking? Authorizing Provider   amLODIPine (NORVASC) 5 MG tablet Take 1 tablet by mouth Daily. 6/4/24  Yes Napoleon Mead MD   ammonium lactate (AMLACTIN) 12 % cream Apply 1 Application topically to the appropriate area as directed 2 (Two) Times a Day. 5/1/24  Yes Raulito Disla MD   atorvastatin (LIPITOR) 80 MG tablet Take 1 tablet by mouth Every Night. Indications: High Amount of Fats in the Blood 3/14/24  Yes Lilia Orona APRN   bisoprolol (ZEBeta) 5 MG tablet Take 1 tablet by mouth Daily. 6/18/24  Yes  Napoleon Mead MD   doxazosin (CARDURA) 1 MG tablet Take 1 tablet by mouth Daily.   Yes Melvin Gusman MD   DULoxetine (CYMBALTA) 30 MG capsule Take 1 capsule by mouth Daily. 6/18/24  Yes Napoleon Mead MD   furosemide (LASIX) 20 MG tablet Take 1 tablet by mouth 2 (Two) Times a Day. 6/26/24  Yes Napoleon Gutierrez MD   Insulin Glargine, 2 Unit Dial, (TOUJEO) 300 UNIT/ML solution pen-injector injection Inject 90 Units under the skin into the appropriate area as directed Daily. 7/30/24  Yes Lilia Orona APRN   Insulin Lispro, 1 Unit Dial, (HumaLOG KwikPen) 100 UNIT/ML solution pen-injector Inject 12-17 Units under the skin into the appropriate area as directed 3 (Three) Times a Day Before Meals. 7/23/24  Yes Lilia Orona APRN   lansoprazole (PREVACID) 30 MG capsule TAKE 1 CAPSULE DAILY 11/15/23  Yes Napoleon Gutierrez MD   levothyroxine (SYNTHROID, LEVOTHROID) 112 MCG tablet Take 1 tablet by mouth Every Morning. 7/23/24  Yes Lilia Orona APRN   octreotide (sandoSTATIN) 30 MG injection Inject 30 mg into the appropriate muscle as directed by prescriber Every 28 (Twenty-Eight) Days. Next dose 9/16/2024   Yes ProviderMelvin MD   pregabalin (LYRICA) 75 MG capsule Take 1 capsule by mouth 2 (Two) Times a Day. 7/17/24  Yes Napoleon Mead MD   hydrocortisone-bacitracin-zinc oxide-nystatin (MAGIC BARRIER) Apply 1 Application topically to the appropriate area as directed 2 (Two) Times a Day. 5/1/24   Raulito Disla MD   Insulin Pen Needle (BD Pen Needle Naila 2nd Gen) 32G X 4 MM misc 1 each by Other route 4 (Four) Times a Day. 7/11/24   Lilia Orona APRN       Allergies:  Allergies   Allergen Reactions   • Sulfa Antibiotics Unknown - High Severity     Pt says it was a long time ago and I don't remember but it wasn't good.        Objective     Vitals:   Temp:  [97.1 °F (36.2 °C)-97.7 °F (36.5 °C)] 97.7 °F (36.5 °C)  Heart Rate:  [60-69] 63  Resp:  [16-18] 18  BP: (160-204)/(67-94) 183/92  Flow  (L/min):  [2] 2    Intake/Output Summary (Last 24 hours) at 8/25/2024 1824  Last data filed at 8/25/2024 1431  Gross per 24 hour   Intake 950 ml   Output 3250 ml   Net -2300 ml       Physical Exam:    General Appearance: frail pleasant WF comfortable alert  Skin: warm and dry  HEENT: oral mucosa normal, nonicteric sclera  Neck: supple, no JVD  Lungs: CTA bilat on rales  Heart: RRR, normal S1 and S2  Abdomen: soft, nontender, nondistended  : pure wick  Extremities: 3+ BLE edema with legs wrapped, +erythema above the wrapping   Neuro: normal speech and mental status     Scheduled Meds:     [START ON 8/26/2024] amLODIPine, 2.5 mg, Oral, Daily  atorvastatin, 80 mg, Oral, Nightly  bisoprolol, 5 mg, Oral, Daily  ceFAZolin, 2,000 mg, Intravenous, Q8H  DULoxetine, 30 mg, Oral, Daily  furosemide, 40 mg, Intravenous, BID  heparin (porcine), 5,000 Units, Subcutaneous, Q12H  insulin glargine, 60 Units, Subcutaneous, Nightly  insulin lispro, 10 Units, Subcutaneous, TID AC  insulin lispro, 3-14 Units, Subcutaneous, 4x Daily AC & at Bedtime  lactobacillus acidophilus, 1 capsule, Oral, Daily  levothyroxine, 112 mcg, Oral, Q AM  melatonin, 2.5 mg, Oral, Nightly  mupirocin, 1 Application, Topical, Q12H  pantoprazole, 40 mg, Oral, Q AM  pregabalin, 50 mg, Oral, BID  sodium chloride, 3 mL, Intravenous, Q12H  vancomycin, 750 mg, Intravenous, Q24H      IV Meds:   Pharmacy to dose vancomycin,         Results Reviewed:   I have personally reviewed the results from the time of this admission to 8/25/2024 18:24 EDT     Lab Results   Component Value Date    GLUCOSE 166 (H) 08/25/2024    CALCIUM 8.2 (L) 08/25/2024     08/25/2024    K 4.1 08/25/2024    CO2 24.0 08/25/2024     08/25/2024    BUN 22 08/25/2024    CREATININE 1.85 (H) 08/25/2024    EGFRIFAFRI 50 (L) 01/18/2021    EGFRIFNONA 51 (L) 02/28/2022    BCR 11.9 08/25/2024    ANIONGAP 12.0 08/25/2024      Lab Results   Component Value Date    MG 1.8 08/25/2024    PHOS 6.0 (H)  05/01/2024    ALBUMIN 4.0 07/18/2024           Assessment / Plan     ASSESSMENT:  CKD stage 4 - Cr stable 1.8, BL 1.8 to 2 range, in assoc with HTN, DM2, and diastolic CHF/cardiorenal syn.  Assess for proteinuria.    Vol overload - periph > central, with lower ext cellulitis.  No dyspnea.  Check albumin.  Decent UOP thus far with IV lasix.  Add aldactone as adjunct given low K  Hypokalemia, due to diuresis, K 3.1, replaced up to 4.1   RLE cellulitis, on abx per primary team (vanc, cefazolin), pharmacy dosing former  Hx diastolic CHF - prior echo noted from 2022.  Repeat study pending  HTN - uncontrolled, SBP 170s to 190s, on low dose norvasc 2.5 mg (won't titrate due to edema), and bisoprolol with HR 60s.  Appears ACEi stopped few mos back due to progression of CKD (and would not restart while diuresing) .   Anemia of CKD, hgb 9.8 -> 9.5.  Hold off checking SPEP/IF given frailty   DM2, mgmt per primary team.  A1c 7.9    PLAN:  Agree with lasix 40mg IV BID for now  Add aldactone and hydralazine  Assess for proteinuria and check serum albumin   Follow up echo report    Thank you for involving us in the care of Halie Guidry.  Please feel free to call with any questions.    Alon Stock MD  08/25/24  18:24 EDT    [facility]  [phone]

## 2024-08-25 NOTE — PLAN OF CARE
Goal Outcome Evaluation:  Plan of Care Reviewed With: patient        Progress: improving  Outcome Evaluation: pt c/o BLE pain - po pain medication given with some relief, redness/pink noted to BLE shin/calf area, multiple areas of ulcers noted on both legs but worse on right leg, ointment applied and wrapped legs with kerlex, pt eating well, monitoring blood sugars and insulin given as ordered, purewick in place, pt an assist of 2 to chair/bsc, pt had bm this shift, potassium replaced per md order, iv at o for iv abx, pt hypertensive - medications given as ordered, denies soa, does not need oxygen during the day

## 2024-08-25 NOTE — PROGRESS NOTES
Name: Halie Guidry ADMIT: 2024   : 1940  PCP: Napoleon Mead MD    MRN: [id number] LOS: 0 days   AGE/SEX: 84 y.o. female  ROOM: [facility]     Subjective   Subjective   Laying in bed.  No acute events overnight.  Patient reports she is feeling better.  Close Imburgia swelling seems to be improving, erythema improved as well.  No fevers or chills.    Review of Systems   As above  Objective   Objective   Vital Signs  Temp:  [97.1 °F (36.2 °C)-97.6 °F (36.4 °C)] 97.3 °F (36.3 °C)  Heart Rate:  [60-69] 68  Resp:  [16-18] 18  BP: (156-204)/(65-94) 197/79  SpO2:  [87 %-97 %] 95 %  on  Flow (L/min):  [2] 2;   Device (Oxygen Therapy): room air  Body mass index is 30.87 kg/m².  Physical Exam    General: Alert, sitting up in the bed, eating breakfast, chronically ill-appearing  HEENT: Normocephalic, atraumatic  CV: Regular rate and rhythm, no murmurs rubs or gallops  Lungs: Clear to auscultation bilaterally, no crackles or wheezes  Abdomen: Soft, nontender, nondistended  Extremities: Bilateral lower extremity edema, bilateral lower extremity dressing, erythema(improving per report)    Results Review     I reviewed the patient's new clinical results.  Results from last 7 days   Lab Units 24  0539 24  1733 24  1415   WBC 10*3/mm3 12.89* 10.35 12.49*   HEMOGLOBIN g/dL 9.5* 10.2* 9.8*   PLATELETS 10*3/mm3 341 337 334     Results from last 7 days   Lab Units 24  0539 24  1733   SODIUM mmol/L 141 140   POTASSIUM mmol/L 3.1* 3.6   CHLORIDE mmol/L 105 104   CO2 mmol/L 24.0 24.0   BUN mg/dL 22 20   CREATININE mg/dL 1.85* 1.78*   GLUCOSE mg/dL 166* 261*   Estimated Creatinine Clearance: 26.9 mL/min (A) (by C-G formula based on SCr of 1.85 mg/dL (H)).    Results from last 7 days   Lab Units 24  0539 24  1733   CALCIUM mg/dL 8.2* 8.8   MAGNESIUM mg/dL 1.8 1.8     Results from last 7 days   Lab Units 24   LACTATE mmol/L 1.2     COVID19   Date Value Ref Range Status    01/27/2024 Not Detected Not Detected - Ref. Range Final   01/09/2024 Detected (C) Not Detected - Ref. Range Final     Hemoglobin A1C   Date/Time Value Ref Range Status   08/24/2024 1733 7.90 (H) 4.80 - 5.60 % Final     Glucose   Date/Time Value Ref Range Status   08/25/2024 1150 164 (H) 70 - 130 mg/dL Final   08/25/2024 0543 170 (H) 70 - 130 mg/dL Final   08/24/2024 2258 333 (H) 70 - 130 mg/dL Final           NM PET/CT Skull Base to Mid Thigh  Cu-64 DOTATATE PET FROM SKULL BASE TO MID THIGH WITH PET/CT FUSION     HISTORY: 84-year-old female with well-differentiated pancreatic  neuroendocrine tumor. Restaging.     TECHNIQUE: Radiation dose reduction techniques were utilized, including  automated exposure control and exposure modulation based on body size.    4.2 mCi of Cu-64 Dotatate were injected and PET was performed from skull  base to mid thigh. CT was obtained for localization and attenuation  correction. Normalization method: patient weight.  Time at injection  2:17 p.m. PET start time 3:17 p.m. Compared with dotatate PET/CT  09/13/2023 and noncontrasted CT abdomen and pelvis 01/28/2024.     FINDINGS: Mediastinal blood pool has a maximal SUV of 1.6, previously  1.1.     1. The dominant component of the pancreatic head mass on the 07/26/2023  CT measured approximately 4 cm, but the region of abnormal uptake on the  09/13/2023 PET/CT was approximately 6.4 cm. On the current PET/CT, the  transverse diameter is 5.8 cm and there has been a decrease in the  intensity of abnormal uptake with a current maximal SUV of 23.1,  previously 41.8. The peripancreatic node posteriorly has a maximal SUV  of 25.3, previously 38.4. No definite new abnormal uptake is seen within  the abdomen. The pattern of liver activity is without significant change  and there is no definite liver metastasis.     2. There is new low-level uptake at a subacute-appearing fracture at the  posterolateral aspect of the right 7th rib with an SUV of  4.1. The  overall low-level uptake is indeterminate. A pathologic fracture cannot  be entirely excluded. There are no other regions of new abnormal uptake.     This report was finalized on 6/19/2024 1:56 PM by Dr. Diane Munoz M.D on  Workstation: [id number]       Scheduled Medications  amLODIPine, 2.5 mg, Oral, Daily  atorvastatin, 80 mg, Oral, Nightly  bisoprolol, 5 mg, Oral, Daily  ceFAZolin, 2,000 mg, Intravenous, Q8H  DULoxetine, 30 mg, Oral, Daily  furosemide, 40 mg, Intravenous, BID  heparin (porcine), 5,000 Units, Subcutaneous, Q12H  insulin glargine, 60 Units, Subcutaneous, Nightly  insulin lispro, 10 Units, Subcutaneous, TID AC  insulin lispro, 3-14 Units, Subcutaneous, 4x Daily AC & at Bedtime  lactobacillus acidophilus, 1 capsule, Oral, Daily  levothyroxine, 112 mcg, Oral, Q AM  melatonin, 2.5 mg, Oral, Nightly  mupirocin, 1 Application, Topical, Q12H  pantoprazole, 40 mg, Oral, Q AM  potassium chloride, 40 mEq, Oral, Q4H  pregabalin, 50 mg, Oral, BID  sodium chloride, 3 mL, Intravenous, Q12H  vancomycin, 750 mg, Intravenous, Q24H    Infusions  Pharmacy to dose vancomycin,     Diet  Diet: Diabetic, Cardiac; Healthy Heart (2-3 Na+); Consistent Carbohydrate; Fluid Consistency: Thin (IDDSI 0)    I have personally reviewed     [x]  Laboratory   [x]  Microbiology   [x]  Radiology   []  EKG/Telemetry  [x]  Cardiology/Vascular   []  Pathology    []  Records       Assessment/Plan     Active Hospital Problems    Diagnosis  POA    **Cellulitis of right lower extremity [L03.115]  Yes    Bilateral lower extremity edema [R60.0]  Yes    CKD (chronic kidney disease) stage 4, GFR 15-29 ml/min [N18.4]  Yes    Acute CHF [I50.9]  Yes    Anemia due to chronic kidney disease [N18.9, D63.1]  Yes    Primary malignant neuroendocrine tumor of pancreas [C7A.8]  Yes    Peripheral edema [R60.0]  Yes    Accelerated hypertension [I10]  Yes    Generalized weakness [R53.1]  Yes    Hyperlipidemia [E78.5]  Yes    Type 2 diabetes  mellitus with hyperglycemia, with long-term current use of insulin [E11.65, Z79.4]  Not Applicable    Neuropathy [G62.9]  Yes    Benign essential hypertension [I10]  Yes      Resolved Hospital Problems   No resolved problems to display.     -year-old female presents the hospital with concern for leg cellulitis right greater than left with multiple leg wounds.     Cellulitis of the leg right greater than left with bilateral leg wounds:  -Wound drainage has a yellow appearance concerning for possible impetigo.   -Continue vancomycin, Omnicef,  -Wound care  -Improving     Acute CHF, anasarca:  Consult nephrology who has manage patient's diuretics in the past.  Previous echocardiogram reviewed as per above from 2022 with normal EF.  Suspect diastolic CHF however check repeat echocardiogram.  proBNP is elevated.  Continue IV Lasix,     CKD 4:  -Previous records reviewed and baseline creatinine 1.7-2.0.  C  -Creatinine stable, monitor    Anemia  -Hemoglobin 9.5, stable compared to recent lab levels  -Chart review iron panel last year was consistent with iron deficient anemia  -Ordered iron panel, B12 folate.     Type 2 diabetes:   -A1c 7.9.  Monitor blood sugar and adjust insulin as needed.    -Blood glucose stable on current regimen       Essential hypertension:   -BP elevated this morning  -Continue bisoprolol, amlodipine  -As needed IV hydralazine ordered for SBP above 180    Neuropathy:   Decrease Lyrica dose due to CKD.  Continue low-dose chronic hydrocodone.     Neuroendocrine tumor:   Reportedly receives management currently with oncology.     Hyperlipidemia: Continue statin.  Stable.     Hypokalemia  -Potassium 3.1.  Replacement ordered.  Repeat this afternoon.      Heparin SC for DVT prophylaxis.  Limited code (no CPR, no intubation).  Discussed with patient and nursing staff.  Expected discharge date/ time has not been documented.       Copied text in this note has been reviewed and is accurate as of 08/25/24.          Dictated utilizing Dragon dictation        Balbina Painting MD  [facility]  08/25/24  12:08 EDT

## 2024-08-25 NOTE — PLAN OF CARE
Goal Outcome Evaluation:  Plan of Care Reviewed With: patient           Outcome Evaluation: Pt is an 83 yo female admitted with c/o bilateral leg swelling and weeping. Dx cellulitis of BLE, edema. She was A&Ox4 when PT arrived. She is admitted from [facility] where she resides. She reports she moved to the [facility] recently and prior to moving there she used to use a walker, but due to the hallway distance started using a w/c and does not leave the W/C often. She is I with transfer from w/c to BR and self propels, but requires assistance for bathing, dressing, and sometimes toileting. She was able to transfer supine to sitting EOB with min A and maintain EOB sitting independently. Pt initially attempted STS but was unable to. The bed was raised higher and pt was then able to complete STS with mod A x1. Pt had forward flexed position due to back pain and fatigues quickly. She was able to side step to the chair with VCs. Pt transferred into the chair. All needs were met before PT left. Patient will benefit from skilled PT services. Discharge recommendations are SNF.      Anticipated Discharge Disposition (PT): skilled nursing facility

## 2024-08-25 NOTE — CONSULTS
"Nutrition Services    Patient Name:  Halie Guidry  YOB: 1940  MRN: [id number]  Admit Date:  8/24/2024    Assessment Date:  08/25/24    NUTRITION SCREENING      Reason for Encounter Nurse practioner/physician assistant consult   Diagnosis/Problem Cellulitis, CHF, CKD 4, T2DM, HTN, Neuropathy, HLD, Neuroendocrine tumor       PO Diet Diet: Diabetic, Cardiac; Healthy Heart (2-3 Na+); Consistent Carbohydrate; Fluid Consistency: Thin (IDDSI 0)   Supplements    PO Intake % Pt stated she normally eats 3 meals per day. 100% of 1 recorded meal per EMR. Stated she ate 100% of breakfast this morning.        Medications MAR reviewed by RD   Labs  Listed below, reviewed   Physical Findings No signs of muscle wasting or fat loss   GI Function Last BM 8/23   Skin Status Multiple diabetic ulcers, wounds to right anterior second toe and bilateral coccyx. RD does not believe these wounds require additional protein supplementation via Chris at this time.        Height  Weight  BMI  Weight Trend     Height: 172.7 cm (68\")  Weight: 92.1 kg (203 lb 0.7 oz) (08/25/24 0700)  Body mass index is 30.87 kg/m².  Gain       Nutrition Problem (PES) No nutrition diagnosis at this time.       Intervention/Plan RD to follow up per protocol.     Results from last 7 days   Lab Units 08/25/24  0539 08/24/24  1733   SODIUM mmol/L 141 140   POTASSIUM mmol/L 3.1* 3.6   CHLORIDE mmol/L 105 104   CO2 mmol/L 24.0 24.0   BUN mg/dL 22 20   CREATININE mg/dL 1.85* 1.78*   CALCIUM mg/dL 8.2* 8.8   GLUCOSE mg/dL 166* 261*     Results from last 7 days   Lab Units 08/25/24  0539 08/24/24  1733   MAGNESIUM mg/dL 1.8 1.8   HEMOGLOBIN g/dL 9.5* 10.2*   HEMATOCRIT % 30.0* 31.0*     Lab Results   Component Value Date    HGBA1C 7.90 (H) 08/24/2024         Electronically signed by:  Carson Loyola  08/25/24 13:14 EDT    "

## 2024-08-26 ENCOUNTER — APPOINTMENT (OUTPATIENT)
Dept: GENERAL RADIOLOGY | Facility: HOSPITAL | Age: 84
End: 2024-08-26
Payer: MEDICARE

## 2024-08-26 ENCOUNTER — APPOINTMENT (OUTPATIENT)
Dept: CARDIOLOGY | Facility: HOSPITAL | Age: 84
End: 2024-08-26
Payer: MEDICARE

## 2024-08-26 LAB
ALBUMIN SERPL-MCNC: 3.2 G/DL (ref 3.5–5.2)
ALBUMIN/GLOB SERPL: 0.9 G/DL
ALP SERPL-CCNC: 130 U/L (ref 39–117)
ALT SERPL W P-5'-P-CCNC: <5 U/L (ref 1–33)
ANION GAP SERPL CALCULATED.3IONS-SCNC: 12.2 MMOL/L (ref 5–15)
ASCENDING AORTA: 3.2 CM
AST SERPL-CCNC: 12 U/L (ref 1–32)
BACTERIA UR QL AUTO: NORMAL /HPF
BH CV ECHO MEAS - ACS: 1.54 CM
BH CV ECHO MEAS - AO MAX PG: 8.2 MMHG
BH CV ECHO MEAS - AO MEAN PG: 4 MMHG
BH CV ECHO MEAS - AO ROOT DIAM: 3.3 CM
BH CV ECHO MEAS - AO V2 MAX: 143.2 CM/SEC
BH CV ECHO MEAS - AO V2 VTI: 34.1 CM
BH CV ECHO MEAS - AVA(I,D): 2.41 CM2
BH CV ECHO MEAS - EDV(CUBED): 134 ML
BH CV ECHO MEAS - EDV(MOD-SP2): 66 ML
BH CV ECHO MEAS - EDV(MOD-SP4): 66 ML
BH CV ECHO MEAS - EF(MOD-BP): 61.8 %
BH CV ECHO MEAS - EF(MOD-SP2): 65.2 %
BH CV ECHO MEAS - EF(MOD-SP4): 60.6 %
BH CV ECHO MEAS - ESV(CUBED): 17.9 ML
BH CV ECHO MEAS - ESV(MOD-SP2): 23 ML
BH CV ECHO MEAS - ESV(MOD-SP4): 26 ML
BH CV ECHO MEAS - FS: 48.9 %
BH CV ECHO MEAS - IVS/LVPW: 0.98 CM
BH CV ECHO MEAS - IVSD: 1.1 CM
BH CV ECHO MEAS - LAT PEAK E' VEL: 8.4 CM/SEC
BH CV ECHO MEAS - LV MASS(C)D: 220 GRAMS
BH CV ECHO MEAS - LV MAX PG: 4.6 MMHG
BH CV ECHO MEAS - LV MEAN PG: 2.37 MMHG
BH CV ECHO MEAS - LV V1 MAX: 107.2 CM/SEC
BH CV ECHO MEAS - LV V1 VTI: 27.3 CM
BH CV ECHO MEAS - LVIDD: 5.1 CM
BH CV ECHO MEAS - LVIDS: 2.6 CM
BH CV ECHO MEAS - LVOT AREA: 3 CM2
BH CV ECHO MEAS - LVOT DIAM: 1.96 CM
BH CV ECHO MEAS - LVPWD: 1.13 CM
BH CV ECHO MEAS - MED PEAK E' VEL: 6.3 CM/SEC
BH CV ECHO MEAS - MV A DUR: 0.11 SEC
BH CV ECHO MEAS - MV A MAX VEL: 84.8 CM/SEC
BH CV ECHO MEAS - MV DEC SLOPE: 468.4 CM/SEC2
BH CV ECHO MEAS - MV DEC TIME: 0.22 SEC
BH CV ECHO MEAS - MV E MAX VEL: 89.8 CM/SEC
BH CV ECHO MEAS - MV E/A: 1.06
BH CV ECHO MEAS - MV MAX PG: 4.7 MMHG
BH CV ECHO MEAS - MV MEAN PG: 1.95 MMHG
BH CV ECHO MEAS - MV P1/2T: 68.8 MSEC
BH CV ECHO MEAS - MV V2 VTI: 29.6 CM
BH CV ECHO MEAS - MVA(P1/2T): 3.2 CM2
BH CV ECHO MEAS - MVA(VTI): 2.8 CM2
BH CV ECHO MEAS - PA ACC TIME: 0.13 SEC
BH CV ECHO MEAS - PA V2 MAX: 102.7 CM/SEC
BH CV ECHO MEAS - PULM A REVS DUR: 0.08 SEC
BH CV ECHO MEAS - PULM A REVS VEL: 25.7 CM/SEC
BH CV ECHO MEAS - PULM DIAS VEL: 78.5 CM/SEC
BH CV ECHO MEAS - PULM S/D: 0.76
BH CV ECHO MEAS - PULM SYS VEL: 59.5 CM/SEC
BH CV ECHO MEAS - RV MAX PG: 2.6 MMHG
BH CV ECHO MEAS - RV V1 MAX: 80.2 CM/SEC
BH CV ECHO MEAS - RV V1 VTI: 16.6 CM
BH CV ECHO MEAS - SV(LVOT): 82.3 ML
BH CV ECHO MEAS - SV(MOD-SP2): 43 ML
BH CV ECHO MEAS - SV(MOD-SP4): 40 ML
BH CV ECHO MEAS - TAPSE (>1.6): 2.5 CM
BH CV ECHO MEASUREMENTS AVERAGE E/E' RATIO: 12.22
BH CV XLRA - RV BASE: 3.9 CM
BH CV XLRA - RV LENGTH: 6.6 CM
BH CV XLRA - RV MID: 2.5 CM
BH CV XLRA - TDI S': 12.1 CM/SEC
BILIRUB SERPL-MCNC: <0.2 MG/DL (ref 0–1.2)
BILIRUB UR QL STRIP: NEGATIVE
BUN SERPL-MCNC: 24 MG/DL (ref 8–23)
BUN/CREAT SERPL: 10.6 (ref 7–25)
C3 SERPL-MCNC: 167 MG/DL (ref 82–167)
C4 SERPL-MCNC: 35 MG/DL (ref 14–44)
CALCIUM SPEC-SCNC: 8.3 MG/DL (ref 8.6–10.5)
CHLORIDE SERPL-SCNC: 100 MMOL/L (ref 98–107)
CLARITY UR: CLEAR
CO2 SERPL-SCNC: 21.8 MMOL/L (ref 22–29)
COLOR UR: YELLOW
CREAT SERPL-MCNC: 2.27 MG/DL (ref 0.57–1)
CREAT UR-MCNC: 54.5 MG/DL
DEPRECATED RDW RBC AUTO: 46.4 FL (ref 37–54)
EGFRCR SERPLBLD CKD-EPI 2021: 20.8 ML/MIN/1.73
ERYTHROCYTE [DISTWIDTH] IN BLOOD BY AUTOMATED COUNT: 14.8 % (ref 12.3–15.4)
FERRITIN SERPL-MCNC: 63.8 NG/ML (ref 13–150)
GLOBULIN UR ELPH-MCNC: 3.6 GM/DL
GLUCOSE BLDC GLUCOMTR-MCNC: 131 MG/DL (ref 70–130)
GLUCOSE BLDC GLUCOMTR-MCNC: 175 MG/DL (ref 70–130)
GLUCOSE BLDC GLUCOMTR-MCNC: 248 MG/DL (ref 70–130)
GLUCOSE BLDC GLUCOMTR-MCNC: 303 MG/DL (ref 70–130)
GLUCOSE SERPL-MCNC: 175 MG/DL (ref 65–99)
GLUCOSE UR STRIP-MCNC: ABNORMAL MG/DL
HCT VFR BLD AUTO: 29.2 % (ref 34–46.6)
HGB BLD-MCNC: 9.5 G/DL (ref 12–15.9)
HGB UR QL STRIP.AUTO: NEGATIVE
HYALINE CASTS UR QL AUTO: NORMAL /LPF
IRON 24H UR-MRATE: 33 MCG/DL (ref 37–145)
IRON SATN MFR SERPL: 12 % (ref 20–50)
KETONES UR QL STRIP: NEGATIVE
LEFT ATRIUM VOLUME INDEX: 21.6 ML/M2
LEUKOCYTE ESTERASE UR QL STRIP.AUTO: NEGATIVE
MAGNESIUM SERPL-MCNC: 1.6 MG/DL (ref 1.6–2.4)
MCH RBC QN AUTO: 27.9 PG (ref 26.6–33)
MCHC RBC AUTO-ENTMCNC: 32.5 G/DL (ref 31.5–35.7)
MCV RBC AUTO: 85.9 FL (ref 79–97)
NITRITE UR QL STRIP: NEGATIVE
PH UR STRIP.AUTO: 6 [PH] (ref 5–8)
PHOSPHATE SERPL-MCNC: 4.2 MG/DL (ref 2.5–4.5)
PLATELET # BLD AUTO: 314 10*3/MM3 (ref 140–450)
PMV BLD AUTO: 9.2 FL (ref 6–12)
POTASSIUM SERPL-SCNC: 3.6 MMOL/L (ref 3.5–5.2)
PROT ?TM UR-MCNC: 434.6 MG/DL
PROT SERPL-MCNC: 6.8 G/DL (ref 6–8.5)
PROT UR QL STRIP: ABNORMAL
PROT/CREAT UR: 7974.3 MG/G CREA (ref 0–200)
RBC # BLD AUTO: 3.4 10*6/MM3 (ref 3.77–5.28)
RBC # UR STRIP: NORMAL /HPF
REF LAB TEST METHOD: NORMAL
SINUS: 2.9 CM
SODIUM SERPL-SCNC: 134 MMOL/L (ref 136–145)
SP GR UR STRIP: 1.01 (ref 1–1.03)
SQUAMOUS #/AREA URNS HPF: NORMAL /HPF
STJ: 2.7 CM
TIBC SERPL-MCNC: 277 MCG/DL (ref 298–536)
TRANSFERRIN SERPL-MCNC: 186 MG/DL (ref 200–360)
UROBILINOGEN UR QL STRIP: ABNORMAL
VANCOMYCIN TROUGH SERPL-MCNC: 15.9 MCG/ML (ref 5–20)
WBC # UR STRIP: NORMAL /HPF
WBC NRBC COR # BLD AUTO: 9.05 10*3/MM3 (ref 3.4–10.8)

## 2024-08-26 PROCEDURE — 83521 IG LIGHT CHAINS FREE EACH: CPT

## 2024-08-26 PROCEDURE — 84156 ASSAY OF PROTEIN URINE: CPT | Performed by: INTERNAL MEDICINE

## 2024-08-26 PROCEDURE — 80202 ASSAY OF VANCOMYCIN: CPT | Performed by: INTERNAL MEDICINE

## 2024-08-26 PROCEDURE — 82784 ASSAY IGA/IGD/IGG/IGM EACH: CPT

## 2024-08-26 PROCEDURE — 25010000002 HEPARIN (PORCINE) PER 1000 UNITS: Performed by: INTERNAL MEDICINE

## 2024-08-26 PROCEDURE — 97530 THERAPEUTIC ACTIVITIES: CPT

## 2024-08-26 PROCEDURE — 82948 REAGENT STRIP/BLOOD GLUCOSE: CPT

## 2024-08-26 PROCEDURE — 71046 X-RAY EXAM CHEST 2 VIEWS: CPT

## 2024-08-26 PROCEDURE — 86235 NUCLEAR ANTIGEN ANTIBODY: CPT

## 2024-08-26 PROCEDURE — 93306 TTE W/DOPPLER COMPLETE: CPT

## 2024-08-26 PROCEDURE — 25010000002 BUMETANIDE PER 0.5 MG: Performed by: HOSPITALIST

## 2024-08-26 PROCEDURE — 82728 ASSAY OF FERRITIN: CPT | Performed by: STUDENT IN AN ORGANIZED HEALTH CARE EDUCATION/TRAINING PROGRAM

## 2024-08-26 PROCEDURE — 93306 TTE W/DOPPLER COMPLETE: CPT | Performed by: INTERNAL MEDICINE

## 2024-08-26 PROCEDURE — 86160 COMPLEMENT ANTIGEN: CPT

## 2024-08-26 PROCEDURE — 86334 IMMUNOFIX E-PHORESIS SERUM: CPT

## 2024-08-26 PROCEDURE — 25010000002 MAGNESIUM SULFATE 2 GM/50ML SOLUTION

## 2024-08-26 PROCEDURE — 85027 COMPLETE CBC AUTOMATED: CPT | Performed by: INTERNAL MEDICINE

## 2024-08-26 PROCEDURE — 63710000001 INSULIN LISPRO (HUMAN) PER 5 UNITS: Performed by: INTERNAL MEDICINE

## 2024-08-26 PROCEDURE — 25010000002 VANCOMYCIN PER 500 MG: Performed by: STUDENT IN AN ORGANIZED HEALTH CARE EDUCATION/TRAINING PROGRAM

## 2024-08-26 PROCEDURE — 25010000002 CEFAZOLIN PER 500 MG: Performed by: INTERNAL MEDICINE

## 2024-08-26 PROCEDURE — 36415 COLL VENOUS BLD VENIPUNCTURE: CPT | Performed by: INTERNAL MEDICINE

## 2024-08-26 PROCEDURE — 80053 COMPREHEN METABOLIC PANEL: CPT | Performed by: STUDENT IN AN ORGANIZED HEALTH CARE EDUCATION/TRAINING PROGRAM

## 2024-08-26 PROCEDURE — 84466 ASSAY OF TRANSFERRIN: CPT | Performed by: STUDENT IN AN ORGANIZED HEALTH CARE EDUCATION/TRAINING PROGRAM

## 2024-08-26 PROCEDURE — 63710000001 INSULIN GLARGINE PER 5 UNITS: Performed by: INTERNAL MEDICINE

## 2024-08-26 PROCEDURE — 84100 ASSAY OF PHOSPHORUS: CPT | Performed by: INTERNAL MEDICINE

## 2024-08-26 PROCEDURE — 84165 PROTEIN E-PHORESIS SERUM: CPT

## 2024-08-26 PROCEDURE — 86038 ANTINUCLEAR ANTIBODIES: CPT

## 2024-08-26 PROCEDURE — 83735 ASSAY OF MAGNESIUM: CPT | Performed by: STUDENT IN AN ORGANIZED HEALTH CARE EDUCATION/TRAINING PROGRAM

## 2024-08-26 PROCEDURE — 83516 IMMUNOASSAY NONANTIBODY: CPT

## 2024-08-26 PROCEDURE — 86225 DNA ANTIBODY NATIVE: CPT

## 2024-08-26 PROCEDURE — 25010000002 CEFAZOLIN PER 500 MG: Performed by: STUDENT IN AN ORGANIZED HEALTH CARE EDUCATION/TRAINING PROGRAM

## 2024-08-26 PROCEDURE — 81001 URINALYSIS AUTO W/SCOPE: CPT | Performed by: INTERNAL MEDICINE

## 2024-08-26 PROCEDURE — 82595 ASSAY OF CRYOGLOBULIN: CPT

## 2024-08-26 PROCEDURE — 82570 ASSAY OF URINE CREATININE: CPT | Performed by: INTERNAL MEDICINE

## 2024-08-26 PROCEDURE — 83540 ASSAY OF IRON: CPT | Performed by: STUDENT IN AN ORGANIZED HEALTH CARE EDUCATION/TRAINING PROGRAM

## 2024-08-26 PROCEDURE — 25010000002 FUROSEMIDE PER 20 MG: Performed by: INTERNAL MEDICINE

## 2024-08-26 RX ORDER — BUMETANIDE 0.25 MG/ML
1 INJECTION INTRAMUSCULAR; INTRAVENOUS 3 TIMES DAILY
Status: DISCONTINUED | OUTPATIENT
Start: 2024-08-26 | End: 2024-08-27

## 2024-08-26 RX ORDER — MAGNESIUM SULFATE HEPTAHYDRATE 40 MG/ML
2 INJECTION, SOLUTION INTRAVENOUS ONCE
Status: COMPLETED | OUTPATIENT
Start: 2024-08-26 | End: 2024-08-26

## 2024-08-26 RX ORDER — POTASSIUM CHLORIDE 750 MG/1
20 TABLET, FILM COATED, EXTENDED RELEASE ORAL ONCE
Status: COMPLETED | OUTPATIENT
Start: 2024-08-26 | End: 2024-08-26

## 2024-08-26 RX ORDER — MUPIROCIN 20 MG/G
1 OINTMENT TOPICAL DAILY
Status: DISCONTINUED | OUTPATIENT
Start: 2024-08-27 | End: 2024-09-04 | Stop reason: HOSPADM

## 2024-08-26 RX ADMIN — MAGNESIUM SULFATE HEPTAHYDRATE 2 G: 2 INJECTION, SOLUTION INTRAVENOUS at 13:40

## 2024-08-26 RX ADMIN — PREGABALIN 50 MG: 50 CAPSULE ORAL at 09:47

## 2024-08-26 RX ADMIN — INSULIN LISPRO 10 UNITS: 100 INJECTION, SOLUTION INTRAVENOUS; SUBCUTANEOUS at 09:18

## 2024-08-26 RX ADMIN — Medication 1 APPLICATION: at 15:00

## 2024-08-26 RX ADMIN — CEFAZOLIN 2000 MG: 2 INJECTION, POWDER, FOR SOLUTION INTRAVENOUS at 12:16

## 2024-08-26 RX ADMIN — LEVOTHYROXINE SODIUM 112 MCG: 112 TABLET ORAL at 05:18

## 2024-08-26 RX ADMIN — DULOXETINE HYDROCHLORIDE 30 MG: 30 CAPSULE, DELAYED RELEASE ORAL at 09:20

## 2024-08-26 RX ADMIN — HYDRALAZINE HYDROCHLORIDE 25 MG: 25 TABLET ORAL at 05:57

## 2024-08-26 RX ADMIN — INSULIN LISPRO 10 UNITS: 100 INJECTION, SOLUTION INTRAVENOUS; SUBCUTANEOUS at 17:55

## 2024-08-26 RX ADMIN — INSULIN LISPRO 3 UNITS: 100 INJECTION, SOLUTION INTRAVENOUS; SUBCUTANEOUS at 09:19

## 2024-08-26 RX ADMIN — HYDROCODONE BITARTRATE AND ACETAMINOPHEN 1 TABLET: 5; 325 TABLET ORAL at 17:55

## 2024-08-26 RX ADMIN — ATORVASTATIN CALCIUM 80 MG: 80 TABLET, FILM COATED ORAL at 21:51

## 2024-08-26 RX ADMIN — BUMETANIDE 1 MG: 0.25 INJECTION INTRAMUSCULAR; INTRAVENOUS at 21:58

## 2024-08-26 RX ADMIN — Medication 1 CAPSULE: at 09:20

## 2024-08-26 RX ADMIN — SPIRONOLACTONE 25 MG: 25 TABLET ORAL at 09:21

## 2024-08-26 RX ADMIN — INSULIN GLARGINE 60 UNITS: 100 INJECTION, SOLUTION SUBCUTANEOUS at 21:54

## 2024-08-26 RX ADMIN — BISOPROLOL FUMARATE 5 MG: 5 TABLET, FILM COATED ORAL at 09:20

## 2024-08-26 RX ADMIN — INSULIN LISPRO 10 UNITS: 100 INJECTION, SOLUTION INTRAVENOUS; SUBCUTANEOUS at 21:58

## 2024-08-26 RX ADMIN — Medication 1 APPLICATION: at 22:00

## 2024-08-26 RX ADMIN — HEPARIN SODIUM 5000 UNITS: 5000 INJECTION INTRAVENOUS; SUBCUTANEOUS at 09:19

## 2024-08-26 RX ADMIN — INSULIN LISPRO 10 UNITS: 100 INJECTION, SOLUTION INTRAVENOUS; SUBCUTANEOUS at 12:17

## 2024-08-26 RX ADMIN — CEFAZOLIN 2000 MG: 2 INJECTION, POWDER, FOR SOLUTION INTRAVENOUS at 04:03

## 2024-08-26 RX ADMIN — BUMETANIDE 1 MG: 0.25 INJECTION INTRAMUSCULAR; INTRAVENOUS at 17:54

## 2024-08-26 RX ADMIN — MUPIROCIN 1 APPLICATION: 20 OINTMENT TOPICAL at 09:21

## 2024-08-26 RX ADMIN — POTASSIUM CHLORIDE 20 MEQ: 750 TABLET, EXTENDED RELEASE ORAL at 12:17

## 2024-08-26 RX ADMIN — PANTOPRAZOLE SODIUM 40 MG: 40 TABLET, DELAYED RELEASE ORAL at 05:50

## 2024-08-26 RX ADMIN — HYDRALAZINE HYDROCHLORIDE 25 MG: 25 TABLET ORAL at 21:54

## 2024-08-26 RX ADMIN — Medication 3 ML: at 09:47

## 2024-08-26 RX ADMIN — HYDRALAZINE HYDROCHLORIDE 25 MG: 25 TABLET ORAL at 13:49

## 2024-08-26 RX ADMIN — INSULIN LISPRO 5 UNITS: 100 INJECTION, SOLUTION INTRAVENOUS; SUBCUTANEOUS at 12:17

## 2024-08-26 RX ADMIN — HEPARIN SODIUM 5000 UNITS: 5000 INJECTION INTRAVENOUS; SUBCUTANEOUS at 21:51

## 2024-08-26 RX ADMIN — AMLODIPINE BESYLATE 2.5 MG: 2.5 TABLET ORAL at 09:20

## 2024-08-26 RX ADMIN — Medication 2.5 MG: at 21:51

## 2024-08-26 RX ADMIN — VANCOMYCIN HYDROCHLORIDE 500 MG: 500 INJECTION, POWDER, LYOPHILIZED, FOR SOLUTION INTRAVENOUS at 15:50

## 2024-08-26 RX ADMIN — CEFAZOLIN 2000 MG: 2 INJECTION, POWDER, FOR SOLUTION INTRAVENOUS at 22:55

## 2024-08-26 RX ADMIN — FUROSEMIDE 40 MG: 10 INJECTION, SOLUTION INTRAMUSCULAR; INTRAVENOUS at 09:19

## 2024-08-26 RX ADMIN — PREGABALIN 50 MG: 50 CAPSULE ORAL at 21:51

## 2024-08-26 RX ADMIN — Medication 3 ML: at 21:59

## 2024-08-26 RX ADMIN — HYDROCODONE BITARTRATE AND ACETAMINOPHEN 1 TABLET: 5; 325 TABLET ORAL at 09:47

## 2024-08-26 NOTE — PROGRESS NOTES
"[facility]: Vancomycin Monitoring Note    Halie Guidry is a 84 y.o. female who is on day 2/7 of pharmacy to dose vancomycin for Skin and Soft Tissue.    Previous Vancomycin Dose:    750 mg IV every  24  hours  Updated Cultures and Sensitivities: 8/24 Bcx: 2/2 NGx1d    Results from last 7 days   Lab Units 08/26/24  1137   VANCOMYCIN TR mcg/mL 15.90     Vitals/Labs  Ht: 172.7 cm (68\"); Wt: 97.5 kg (215 lb)   Temp Readings from Last 1 Encounters:   08/26/24 97 °F (36.1 °C) (Oral)     Estimated Creatinine Clearance: 22.5 mL/min (A) (by C-G formula based on SCr of 2.27 mg/dL (H)).       Results from last 7 days   Lab Units 08/26/24  0540 08/25/24  0539 08/24/24  1733   CREATININE mg/dL 2.27* 1.85* 1.78*   WBC 10*3/mm3 9.05 12.89* 10.35     Assessment/Plan    Large bump in Scr today. Baseline 1.8-2 per renal note. Will change to dose by levels for now.     Current Vancomycin Dose: vancomycin 500 mg IV x1  Next Level Date and Time: Vanc Random on 8/27 at 0600  We will continue to monitor patient changes and renal function     Thank you for involving pharmacy in this patient's care. Please contact pharmacy with any questions or concerns.       Melani Koch, [facility]  Clinical Pharmacist          "

## 2024-08-26 NOTE — PROGRESS NOTES
Name: Halie Guidry ADMIT: 2024   : 1940  PCP: Napoleon Mead MD    MRN: [id number] LOS: 1 days   AGE/SEX: 84 y.o. female  ROOM: [facility]     Subjective   Subjective   No acute events overnight.  Swelling and erythema continues to improve per patient.  Feeling better.  Denies fevers or chills.    Review of Systems   As above  Objective   Objective   Vital Signs  Temp:  [97 °F (36.1 °C)-97.9 °F (36.6 °C)] 97.6 °F (36.4 °C)  Heart Rate:  [58-67] 67  Resp:  [16-18] 16  BP: (160-183)/(69-92) 178/74  SpO2:  [96 %-97 %] 97 %  on   ;   Device (Oxygen Therapy): nasal cannula  Body mass index is 32.69 kg/m².  Physical Exam    General: Alert, sitting up in the bed, eating breakfast, chronically ill-appearing  HEENT: Normocephalic, atraumatic  CV: Regular rate and rhythm, no murmurs   Lungs: CTA anteriorly, no wheezing  Abdomen: Soft, nontender, nondistended  Extremities: Trace-1+ bilateral lower extremity edema, bilateral lower extremity dressing in place.    Results Review     I reviewed the patient's new clinical results.  Results from last 7 days   Lab Units 24  0540 24  0539 24  1733 24  1415   WBC 10*3/mm3 9.05 12.89* 10.35 12.49*   HEMOGLOBIN g/dL 9.5* 9.5* 10.2* 9.8*   PLATELETS 10*3/mm3 314 341 337 334     Results from last 7 days   Lab Units 24  0540 24  1626 24  0539 24  1733   SODIUM mmol/L 134*  --  141 140   POTASSIUM mmol/L 3.6 4.1 3.1* 3.6   CHLORIDE mmol/L 100  --  105 104   CO2 mmol/L 21.8*  --  24.0 24.0   BUN mg/dL 24*  --  22 20   CREATININE mg/dL 2.27*  --  1.85* 1.78*   GLUCOSE mg/dL 175*  --  166* 261*   Estimated Creatinine Clearance: 22.5 mL/min (A) (by C-G formula based on SCr of 2.27 mg/dL (H)).  Results from last 7 days   Lab Units 24  0540   ALBUMIN g/dL 3.2*   BILIRUBIN mg/dL <0.2   ALK PHOS U/L 130*   AST (SGOT) U/L 12   ALT (SGPT) U/L <5     Results from last 7 days   Lab Units 24  0540 24  0539  08/24/24  1733   CALCIUM mg/dL 8.3* 8.2* 8.8   ALBUMIN g/dL 3.2*  --   --    MAGNESIUM mg/dL 1.6 1.8 1.8   PHOSPHORUS mg/dL 4.2  --   --      Results from last 7 days   Lab Units 08/24/24 2002   LACTATE mmol/L 1.2     COVID19   Date Value Ref Range Status   01/27/2024 Not Detected Not Detected - Ref. Range Final   01/09/2024 Detected (C) Not Detected - Ref. Range Final     Hemoglobin A1C   Date/Time Value Ref Range Status   08/24/2024 1733 7.90 (H) 4.80 - 5.60 % Final     Glucose   Date/Time Value Ref Range Status   08/26/2024 0544 175 (H) 70 - 130 mg/dL Final   08/25/2024 2051 121 70 - 130 mg/dL Final   08/25/2024 1601 163 (H) 70 - 130 mg/dL Final   08/25/2024 1150 164 (H) 70 - 130 mg/dL Final   08/25/2024 0543 170 (H) 70 - 130 mg/dL Final   08/24/2024 2258 333 (H) 70 - 130 mg/dL Final           Adult Transthoracic Echo Complete W/ Cont if Necessary Per Protocol    Left ventricular systolic function is normal. Calculated left   ventricular EF = 61.8% Normal left ventricular cavity size noted. Left   ventricular wall thickness is consistent with moderate concentric   hypertrophy. All left ventricular wall segments contract normally. Left   ventricular diastolic function was normal.    The left atrial cavity is moderately dilated.    There is moderate calcification of the aortic valve. The aortic valve   appears trileaflet. Trace aortic valve regurgitation is present. No aortic   valve stenosis is present.    Moderate mitral annular calcification is present. Mild mitral valve   regurgitation is present with multiple jets noted. No significant mitral   valve stenosis is present.    Scheduled Medications  amLODIPine, 2.5 mg, Oral, Daily  atorvastatin, 80 mg, Oral, Nightly  bisoprolol, 5 mg, Oral, Daily  ceFAZolin, 2,000 mg, Intravenous, Q8H  DULoxetine, 30 mg, Oral, Daily  furosemide, 40 mg, Intravenous, BID  heparin (porcine), 5,000 Units, Subcutaneous, Q12H  hydrALAZINE, 25 mg, Oral, Q8H  insulin glargine, 60 Units,  Subcutaneous, Nightly  insulin lispro, 10 Units, Subcutaneous, TID AC  insulin lispro, 3-14 Units, Subcutaneous, 4x Daily AC & at Bedtime  lactobacillus acidophilus, 1 capsule, Oral, Daily  levothyroxine, 112 mcg, Oral, Q AM  melatonin, 2.5 mg, Oral, Nightly  mupirocin, 1 Application, Topical, Q12H  pantoprazole, 40 mg, Oral, Q AM  pregabalin, 50 mg, Oral, BID  sodium chloride, 3 mL, Intravenous, Q12H  spironolactone, 25 mg, Oral, Daily  vancomycin, 750 mg, Intravenous, Q24H    Infusions  Pharmacy to dose vancomycin,     Diet  Diet: Diabetic, Cardiac; Healthy Heart (2-3 Na+); Consistent Carbohydrate; Fluid Consistency: Thin (IDDSI 0)    I have personally reviewed     [x]  Laboratory   [x]  Microbiology   []  Radiology   []  EKG/Telemetry  []  Cardiology/Vascular   []  Pathology    []  Records       Assessment/Plan     Active Hospital Problems    Diagnosis  POA    **Cellulitis of right lower extremity [L03.115]  Yes    Bilateral cellulitis of lower leg [L03.116, L03.115]  Yes    Bilateral lower extremity edema [R60.0]  Yes    CKD (chronic kidney disease) stage 4, GFR 15-29 ml/min [N18.4]  Yes    Acute CHF [I50.9]  Yes    Anemia due to chronic kidney disease [N18.9, D63.1]  Yes    Primary malignant neuroendocrine tumor of pancreas [C7A.8]  Yes    Peripheral edema [R60.0]  Yes    Accelerated hypertension [I10]  Yes    Generalized weakness [R53.1]  Yes    Hyperlipidemia [E78.5]  Yes    Type 2 diabetes mellitus with hyperglycemia, with long-term current use of insulin [E11.65, Z79.4]  Not Applicable    Neuropathy [G62.9]  Yes    Benign essential hypertension [I10]  Yes      Resolved Hospital Problems   No resolved problems to display.     -year-old female presents the hospital with concern for leg cellulitis right greater than left with multiple leg wounds.     Cellulitis of the leg right greater than left with bilateral leg wounds:  -Wound drainage has a yellow appearance concerning for possible impetigo.   -Continue  vancomycin Omnicef,  -Wound care  -Swelling/erythema improving     Acute CHF, anasarca:  -Echocardiogram 08/26/2024 EF of 61.8%,  -On IV Lasix, nephrology managing     CKD 4:  -Previous records reviewed and baseline creatinine 1.7-2.0.    -Creatinine trending up, monitor daily BMP  -Protein to creatinine ratio 7.974, nephrotic range, likely secondary to diabetes  -Nephrology managing    Anemia  -Hemoglobin 9.5, stable compared to recent lab levels  -Chart review iron panel last year was consistent with iron deficient anemia  -Iron panel, B12 folate pending     Type 2 diabetes:   -A1c 7.9.  Monitor blood sugar and adjust insulin as needed.    -Blood glucose stable on current regimen  -Continue Lantus 70 units nightly, scheduled lispro 10 units 3 times daily with meals, SSI       Essential hypertension:   BP elevated, hydralazine, spironolactone added per nephrology    Neuropathy:   Decrease Lyrica dose due to CKD.  Continue low-dose chronic hydrocodone.     Neuroendocrine tumor:   Reportedly receives management currently with oncology.     Hyperlipidemia: Continue statin.  Stable.     Hypokalemia  -Potassium 3.6 today.      Heparin SC for DVT prophylaxis.  Limited code (no CPR, no intubation).  Discussed with patient and nursing staff.  Disposition: SNF, timing to be determined  Expected discharge date/ time has not been documented.       Copied text in this note has been reviewed and is accurate as of 08/26/24.         Dictated utilizing Dragon dictation        Balbina Painting MD  [facility]  08/26/24  09:34 EDT

## 2024-08-26 NOTE — PLAN OF CARE
Goal Outcome Evaluation:  Plan of Care Reviewed With: patient        Progress: improving  Outcome Evaluation: iv abx, ble drsg changed, pure wick, ua spec sent, still needs echo done, accumax, no resp distress, accucheck

## 2024-08-26 NOTE — PROGRESS NOTES
[facility] Progress Note      Patient Name: Halie Guidry  : 1940  MRN: [id number]  Primary Care Physician:  Napoleon Mead MD  Date of admission: 2024    Subjective     Interval History:   F/U CKD 4    States she felt very SOB last night. Sitting up in bed w/ c/o mild dyspnea on O2.   Getting legs wrapped by wound care.  Appetite fair. No n/v/d. Had normal BM yesterday.  2.3L UOP recorded last 24 hrs. Denies urinary symptoms.   Recorded weight up 12 lbs. from yesterday.     Review of Systems:   As noted above    Objective     Vitals:   Temp:  [97 °F (36.1 °C)-97.9 °F (36.6 °C)] 97.6 °F (36.4 °C)  Heart Rate:  [58-67] 67  Resp:  [16-18] 16  BP: (160-183)/(69-92) 178/74    Intake/Output Summary (Last 24 hours) at 2024 0917  Last data filed at 2024 0725  Gross per 24 hour   Intake 810 ml   Output 2030 ml   Net -1220 ml       Physical Exam:    General Appearance: frail pleasant WF comfortable alert  Skin: warm and dry  HEENT: oral mucosa normal, nonicteric sclera  Neck: supple, no JVD  Lungs: CTA bilat rales bases, 2L/min  Heart: RRR, normal S1 and S2  Abdomen: soft, nontender, nondistended  : No palpable bladder, pure wick  Extremities: nonpitting BLE edema with legs being wrapped, +erythema, wrinkling   Neuro: normal speech and mental status     Scheduled Meds:     amLODIPine, 2.5 mg, Oral, Daily  atorvastatin, 80 mg, Oral, Nightly  bisoprolol, 5 mg, Oral, Daily  ceFAZolin, 2,000 mg, Intravenous, Q8H  DULoxetine, 30 mg, Oral, Daily  furosemide, 40 mg, Intravenous, BID  heparin (porcine), 5,000 Units, Subcutaneous, Q12H  hydrALAZINE, 25 mg, Oral, Q8H  insulin glargine, 60 Units, Subcutaneous, Nightly  insulin lispro, 10 Units, Subcutaneous, TID AC  insulin lispro, 3-14 Units, Subcutaneous, 4x Daily AC & at Bedtime  lactobacillus acidophilus, 1 capsule, Oral, Daily  levothyroxine, 112 mcg, Oral, Q AM  melatonin, 2.5 mg, Oral, Nightly  mupirocin, 1  Application, Topical, Q12H  pantoprazole, 40 mg, Oral, Q AM  pregabalin, 50 mg, Oral, BID  sodium chloride, 3 mL, Intravenous, Q12H  spironolactone, 25 mg, Oral, Daily  vancomycin, 750 mg, Intravenous, Q24H      IV Meds:   Pharmacy to dose vancomycin,         Results Reviewed:   I have personally reviewed the results from the time of this admission to 8/26/2024 09:17 EDT     Results from last 7 days   Lab Units 08/26/24  0540 08/25/24  1626 08/25/24  0539 08/24/24  1733   SODIUM mmol/L 134*  --  141 140   POTASSIUM mmol/L 3.6 4.1 3.1* 3.6   CHLORIDE mmol/L 100  --  105 104   CO2 mmol/L 21.8*  --  24.0 24.0   BUN mg/dL 24*  --  22 20   CREATININE mg/dL 2.27*  --  1.85* 1.78*   CALCIUM mg/dL 8.3*  --  8.2* 8.8   BILIRUBIN mg/dL <0.2  --   --   --    ALK PHOS U/L 130*  --   --   --    ALT (SGPT) U/L <5  --   --   --    AST (SGOT) U/L 12  --   --   --    GLUCOSE mg/dL 175*  --  166* 261*       Estimated Creatinine Clearance: 22.5 mL/min (A) (by C-G formula based on SCr of 2.27 mg/dL (H)).    Results from last 7 days   Lab Units 08/26/24  0540 08/25/24  0539 08/24/24  1733   MAGNESIUM mg/dL 1.6 1.8 1.8   PHOSPHORUS mg/dL 4.2  --   --              Results from last 7 days   Lab Units 08/26/24  0540 08/25/24  0539 08/24/24  1733 08/19/24  1415   WBC 10*3/mm3 9.05 12.89* 10.35 12.49*   HEMOGLOBIN g/dL 9.5* 9.5* 10.2* 9.8*   PLATELETS 10*3/mm3 314 341 337 334             Assessment / Plan     ASSESSMENT:  CKD stage 4 - Cr increased today, BL 1.8 to 2 range, in assoc with HTN, DM2, and diastolic CHF/cardiorenal syn.  UA (8/26/24) glucose & 3+ protein. UPCR 7,974.      Vol overload - periph > central, with lower ext cellulitis. Mild dyspnea on O2.  Albumin acceptable range. Decent UOP thus far with IV lasix.  Aldactone added as adjunct given low K (8/25/24).   Hypokalemia, due to diuresis.   RLE cellulitis, on abx per primary team (vanc, cefazolin), pharmacy dosing former  Hx diastolic CHF - prior echo noted from 2022.  Repeat  study 8/26 EF=61.8%. Mod calcificaiton of aortic valve, trace aortic valve regurg, mod mitral annular calcification.    HTN - uncontrolled, SBP 170s to 190s, on low dose norvasc 2.5 mg (won't titrate due to edema), and bisoprolol with HR 60s.  Appears ACEi stopped few mos back due to progression of CKD (and would not restart while diuresing) . Aldactone and hydralazine added 8/25/24.  Anemia of CKD, hgb stable  Hold off checking SPEP/IF given frailty   DM2, mgmt per primary team.  A1c 7.9    PLAN:  Chest XR given dyspnea and increased recorded weight  A will switch Lasix to IV Bumex 1 mg IV 3 times daily  Will repeat UPCR to verify accuracy. Order serologies for completeness.   Monitor for diuretic toxicity  Replete magnesium   Replete potassium  Labs in AM    Thank you for involving us in the care of Halie JANIE Petersmary.  Please feel free to call with any questions.    Brendon Aguilar MD   08/26/24  09:17 EDT    [facility]  [phone]    Please note that portions of this note were completed with a voice recognition program.

## 2024-08-26 NOTE — NURSING NOTE
08/26/24 0954   Wound 08/24/24 2208 Left lower leg Diabetic Ulcer   Placement Date/Time: 08/24/24 2208   Present on Original Admission: Yes  Side: Left  Orientation: lower  Location: leg  Primary Wound Type: Diabetic Ulcer   Base yellow  (yellow crusted abrasions to medial and lateral aspect of lower leg)   Periwound intact;pink;redness   Periwound Temperature warm   Drainage Characteristics/Odor serous   Drainage Amount scant   Care, Wound cleansed with;soap and water   Dressing Care dressing changed  (Bactroban applied to crusted areas, then covered with Vaseline guaze. Secured with roll guaze.)   Wound 08/24/24 2208 Right lower leg Diabetic Ulcer   Placement Date/Time: 08/24/24 2208   Side: Right  Orientation: lower  Location: leg  Primary Wound Type: Diabetic Ulcer   Base yellow;scab;pink  (multiple yellow/ pink crusted abrasions)   Periwound redness;pink;intact   Periwound Temperature warm   Drainage Characteristics/Odor serous   Drainage Amount scant   Care, Wound cleansed with;soap and water   Dressing Care dressing changed  (Bactroban applied to crusted areas, then covered with Vaseline guaze. Secured with roll guaze.)   Wound 04/21/24 0100 Bilateral coccyx   Placement Date/Time: 04/21/24 0100   Present on Original Admission: Yes  Side: Bilateral  Location: coccyx   Base blanchable;non-blanchable;pink   Periwound intact;dry   Periwound Temperature warm   Drainage Amount none   Dressing Care open to air  (Barrier cream OR sacral Optifoam to be used for protection)   Wound 08/24/24 2228 Right anterior second toe   Placement Date/Time: 08/24/24 2228   Side: Right  Orientation: anterior  Location: second toe   Base maroon/purple   Periwound intact;redness;swelling   Drainage Amount none   Care, Wound cleansed with;soap and water   Dressing Care open to air  (Bactroban applied)   Skin Interventions   Pressure Reduction Devices pressure-redistributing mattress utilized  (I have asked her RN to transfer her to a  Centrella Pro plus mattress)   Pressure Reduction Techniques heels elevated off bed;positioned off wounds     CWON note: pt seen for evaluation of skin issues POA. She is alert and oriented, able to turn and reposition self in bed with some assistance. Legs as noted above, dressings to kinsey LE's changed and wound care orders placed into EPIC. Heels are negative for erythema or breakdown and should be off-loaded at all times. Coccyx as noted above, she can have either the barrier cream OR a sacral optifoam, but not both. Also, I have asked her RN to obtain a Pro plus mattress to help with control of  microclimate and pressure redistribution. She has intertriginous dermatitis with yeast component under abdominal folds. Magic Barrier cream has been ordered, pillow cases can be placed between skin folds to prevent skin on skin friction and wick moisture. Prevention standing orders placed into EPIC. Discussed all with RN and pt.

## 2024-08-26 NOTE — PROGRESS NOTES
Discharge Planning Assessment  [facility]     Patient Name: Halie Guidry  MRN: [id number]  Today's Date: 8/26/2024    Admit Date: 8/24/2024    Plan: From/return to [facility]   Discharge Needs Assessment       Row Name 08/26/24 1255       Living Environment    People in Home facility resident    Current Living Arrangements assisted living facility    Duration at Residence Since July 1, 2024    Primary Care Provided by self  Facility Resident    Provides Primary Care For no one    Family Caregiver if Needed child(beatriz), adult;other (see comments)  Facility resident    Quality of Family Relationships helpful;involved;supportive    Able to Return to Prior Arrangements yes       Transition Planning    Patient/Family Anticipates Transition to home  Facility Resident    Transportation Anticipated family or friend will provide       Discharge Needs Assessment    Equipment Currently Used at Home walker, rolling;wheelchair;shower chair;grab bar    Provided Post Acute Provider List? N/A    N/A Provider List Comment Denies needs. Plan is to return to [place]                   Discharge Plan       Row Name 08/26/24 1258       Plan    Plan From/return to [place]    Patient/Family in Agreement with Plan yes    Plan Comments IMM noted. Introduced self and role of CCP. Facesheet verified. Patient lives at [facility]. Stated she has lived there since July 1, 2024. Stated she has one large room with bathroom. Stated there is a small refridge and microwave present. In large room, patient stated there is a recliner and hospital bed. In bathroom, there is a walk-in shower with shower chair and grab bars present. Stated staff assist with bathing but she is able to dress and groom self. Stated she uses a walker in her room but uses a manual wheelchair when out of room. Has no trouble manuvering wheelchair. Goes to the dining room for most meals. If doesn't go to dining room, has meals  "delivered. Stated she just got approved for a mechanical wheelchair that she will be getting from [employer]. Stated she gets her medications from [facility] and takes on her own. Stated she son Derrick picks medications up pharmacy and brings them to her. Stated she has been thinking about changing to [facility] where her meds can be packaged and delivered. Has used [facility] in the past. Stated she has been to several rehab facilities. Stated she has been to [facility], [facility] and [facility]. Belives there were more but couldn't remember. CT plan is to return home to [place]. Stated her son Derrick will provide transporation at [place].                    Demographic Summary       Row Name 08/26/24 1252       General Information    Admission Type inpatient    Arrived From other (see comments)  Facility Resident    Required Notices Provided Important Message from Medicare    Reason for Consult discharge planning    Preferred Language English                   Functional Status       Row Name 08/26/24 1253       Functional Status    Usual Activity Tolerance fair    Current Activity Tolerance fair       Functional Status, IADL    Medications independent    Meal Preparation assistive person    Housekeeping assistive person    Laundry completely dependent    Shopping assistive person       Employment/    Employment Status retired                   Psychosocial       Row Name 08/26/24 1254       Values/Beliefs    Spiritual, Cultural Beliefs, Caodaism Practices, Values that Affect Care no       Behavior WDL    Behavior WDL WDL       Emotion Mood WDL    Emotion/Mood/Affect WDL WDL       Coping/Stress    Sources of Support adult child(beatriz);other (see comments)  Facility Resident    Understanding of Condition and Treatment adequate understanding of treatment       Developmental Stage (Leonela's)    Developmental Stage Stage 8 (65 years-death/Late Adulthood) Integrity vs. Despair                   " Abuse/Neglect       Row Name 08/26/24 3912       Personal Safety    Feels Unsafe at Home or Work/School no    Feels Threatened by Someone no    Does Anyone Try to Keep You From Having Contact with Others or Doing Things Outside Your Home? no    Physical Signs of Abuse Present no             Vannessa Mcdowell, RN

## 2024-08-26 NOTE — THERAPY TREATMENT NOTE
Patient Name: Halie Guidry  : 1940    MRN: [id number]                              Today's Date: 2024       Admit Date: 2024    Visit Dx:     ICD-10-CM ICD-9-CM   1. Cellulitis of lower extremity, unspecified laterality  L03.119 682.6   2. Bilateral lower extremity edema  R60.0 782.3   3. Chronic kidney disease, unspecified CKD stage  N18.9 585.9   4. History of diabetes mellitus  Z86.39 V12.29     Patient Active Problem List   Diagnosis    Compression fracture    Lumbar degenerative disc disease    Type 2 diabetes mellitus with hyperglycemia, with long-term current use of insulin    Hyperlipidemia    Benign essential hypertension    Primary hypothyroidism    Neuropathy    Osteoporosis    Proteinuria    Tobacco abuse    Generalized weakness    Spinal stenosis    Scoliosis    Arthritis    VBI (vertebrobasilar insufficiency)    Orthostatic hypotension    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    Severe hypothyroidism    Noncompliance with medication regimen    Vitamin D deficiency disease    Tremor    Seizure    Thoracic degenerative disc disease    Hyperglycemia    Type II diabetes mellitus, uncontrolled    Lower abdominal pain    Transaminitis    Emphysematous cystitis    Choledocholithiasis    Hypokalemia    Hypoxia    Generalized abdominal pain    History of Clostridium difficile infection    History of ERCP    Hypomagnesemia    Anxiety disorder    Neuropathic pain    Intertrigo    Weakness of both lower extremities    Accelerated hypertension    Acute cystitis without hematuria    Left lower lobe pneumonia    Cellulitis and abscess of left lower extremity    Benign hypertension with CKD (chronic kidney disease) stage IV    Peripheral edema    Primary malignant neuroendocrine tumor of pancreas    Encounter for long-term (current) use of high-risk medication    Diabetic foot ulcer    Stage 3a chronic kidney disease    Pressure injury of buttock, stage 2    HTN  (hypertension)    Anemia due to chronic kidney disease    Bilateral lower extremity edema    Cellulitis of right lower extremity    CKD (chronic kidney disease) stage 4, GFR 15-29 ml/min    Acute CHF    Bilateral cellulitis of lower leg     Past Medical History:   Diagnosis Date    Acute metabolic encephalopathy 06/24/2022    Anxiety     Arthritis     Benign essential hypertension 08/20/2014    Bleeding disorder     Depression     Diabetes     Diabetes mellitus, type 2     Disc degeneration, lumbar     Headache, tension-type     Hyperlipidemia     Hypothyroidism     Neuropathy     Osteoporosis 09/09/2015    Pancreatic mass     Sept 2023, on Monthly Octreotide Injection    Peripheral neuropathy     Rotator cuff tear, left     Scoliosis     Shoulder pain     LEFT, TORN ROTATOR CUFF S/P FALL    Spinal stenosis      Past Surgical History:   Procedure Laterality Date    APPENDECTOMY      BILATERAL BREAST REDUCTION Bilateral 08/2015    CATARACT EXTRACTION  03/2015    CHOLECYSTECTOMY WITH INTRAOPERATIVE CHOLANGIOGRAM N/A 3/27/2022    Procedure: CHOLECYSTECTOMY LAPAROSCOPIC INTRAOPERATIVE CHOLANGIOGRAM;  Surgeon: Aiyana Hill MD;  Location: [facility] MAIN OR;  Service: General;  Laterality: N/A;    COLONOSCOPY  06/05/2015    WNL    ERCP N/A 2/25/2022    Procedure: ENDOSCOPIC RETROGRADE CHOLANGIOPANCREATOGRAPHY with sphincterotomy and balloon sweep;  Surgeon: Chilo Wilhelm MD;  Location: [facility] ENDOSCOPY;  Service: Gastroenterology;  Laterality: N/A;  PRE/POST - CBD stones    ERCP N/A 3/28/2022    Procedure: ENDOSCOPIC RETROGRADE CHOLANGIOPANCREATOGRAPHY WITH SPHINCTEROTOMY AND BALLOON SWEEP;  Surgeon: Chilo Wilhelm MD;  Location: [facility] ENDOSCOPY;  Service: Gastroenterology;  Laterality: N/A;  PRE: COMMON DUCT STONE  POST: COMMON DUCT STONE    KYPHOPLASTY      REDUCTION MAMMAPLASTY      TONSILLECTOMY        General Information       Row Name 08/26/24 1546          Physical Therapy Time and Intention     Document Type therapy note (daily note)  -SV     Mode of Treatment individual therapy;physical therapy  -SV               User Key  (r) = Recorded By, (t) = Taken By, (c) = Cosigned By      Initials Name Provider Type    Helene Hays, PT Physical Therapist                   Mobility       Row Name 08/26/24 1556          Bed Mobility    Comment, (Bed Mobility) NT up on eob with nsg asst  -SV       Row Name 08/26/24 1556          Bed-Chair Transfer    Bed-Chair [place] (Transfers) minimum assist (75% patient effort);moderate assist (50% patient effort);2 person assist  -SV     Comment, (Bed-Chair Transfer) pivot bed to transport gurney  -SV       Row Name 08/26/24 1556          Sit-Stand Transfer    Sit-Stand [place] (Transfers) moderate assist (50% patient effort);2 person assist  -SV     Assistive Device (Sit-Stand Transfers) walker, front-wheeled  -SV     Comment, (Sit-Stand Transfer) bed elevated , able to complet stand and turn to gurney  -SV               User Key  (r) = Recorded By, (t) = Taken By, (c) = Cosigned By      Initials Name Provider Type    Helene Hays, PT Physical Therapist                   Obj/Interventions       Row Name 08/26/24 1557          Motor Skills    Therapeutic Exercise --  educated on need for improved ankle DF rom  -SV               User Key  (r) = Recorded By, (t) = Taken By, (c) = Cosigned By      Initials Name Provider Type    Helene Hays, PT Physical Therapist                   Goals/Plan    No documentation.                  Clinical Impression       Row Name 08/26/24 1557          Pain    Pre/Posttreatment Pain Comment no report of pain ,reported less edema BLE  -SV       Row Name 08/26/24 1552          Vital Signs    O2 Delivery Pre Treatment room air  -SV     O2 Delivery Intra Treatment room air  -SV     O2 Delivery Post Treatment room air  -SV     Pre Patient Position Sitting  -SV     Intra Patient Position Standing  -SV     Post Patient  Position Supine  -SV       Row Name 08/26/24 1557          Positioning and Restraints    Pre-Treatment Position in bed  -SV     Post Treatment Position other  -SV     Other Position with nsg;with other staff  transport  -SV               User Key  (r) = Recorded By, (t) = Taken By, (c) = Cosigned By      Initials Name Provider Type    Helene Hays, PT Physical Therapist                   Outcome Measures       Row Name 08/26/24 1558 08/26/24 1003       How much help from another person do you currently need...    Turning from your back to your side while in flat bed without using bedrails? 3  -SV 3  -KK    Moving from lying on back to sitting on the side of a flat bed without bedrails? 3  -SV 3  -KK    Moving to and from a bed to a chair (including a wheelchair)? 2  -SV 3  -KK    Standing up from a chair using your arms (e.g., wheelchair, bedside chair)? 2  -SV 2  -KK    Climbing 3-5 steps with a railing? 1  -SV 1  -KK    To walk in hospital room? 1  -SV 1  -KK    AM-PAC 6 Clicks Score (PT) 12  -SV 13  -KK    Highest Level of Mobility Goal 4 --> Transfer to chair/commode  -SV 4 --> Transfer to chair/commode  -KK              User Key  (r) = Recorded By, (t) = Taken By, (c) = Cosigned By      Initials Name Provider Type    IBIS Ragini Barrios, RN Registered Nurse    Helene Hays, PT Physical Therapist                                 Physical Therapy Education       Title: PT OT SLP Therapies (In Progress)       Topic: Physical Therapy (In Progress)       Point: Mobility training (Done)       Learning Progress Summary             Patient Acceptance, E, VU by ESPINOZA at 8/25/2024 1435                         Point: Home exercise program (Not Started)       Learner Progress:  Not documented in this visit.              Point: Body mechanics (Done)       Learning Progress Summary             Patient Acceptance, E, VU by ESPINOZA at 8/25/2024 1435                         Point: Precautions (Done)       Learning Progress  Summary             Patient Acceptance, E, VU by ESPINOZA at 8/25/2024 1435                                         User Key       Initials Effective Dates Name Provider Type Discipline    ESPINOZA 01/16/24 -  Kenya Ruiz, PT Physical Therapist PT                  PT Recommendation and Plan           Time Calculation:         PT Charges       Row Name 08/26/24 1600             Time Calculation    Start Time 1546  -SV      Stop Time 1554  -SV      Time Calculation (min) 8 min  -SV      PT Received On 08/26/24  -SV      PT - Next Appointment 08/27/24  -SV                User Key  (r) = Recorded By, (t) = Taken By, (c) = Cosigned By      Initials Name Provider Type    SV Helene Hernandez, PT Physical Therapist                  Therapy Charges for Today       Code Description Service Date Service Provider Modifiers Qty    95608722816 HC PT THER SUPP EA 15 MIN 8/26/2024 Helene Hernandez, PT GP 1    45046930592 HC PT THERAPEUTIC ACT EA 15 MIN 8/26/2024 Helene Hernandez, PT GP 1            PT G-Codes  Outcome Measure Options: AM-PAC 6 Clicks Daily Activity (OT)  AM-PAC 6 Clicks Score (PT): 12  AM-PAC 6 Clicks Score (OT): 16       Helene Hernandez PT  8/26/2024

## 2024-08-26 NOTE — PLAN OF CARE
Goal Outcome Evaluation:  Plan of Care Reviewed With: patient        Progress: improving  Outcome Evaluation: alert and oriented, pleasant. bed changed to pro plus per wocn recommendation. BLE wrapped with vaseline gauze and kerlex after washing with soap and applied bacitracin ointment to scabbed areas. pt reports decreased BLE edema. turning q 2 and heels elevated as pt tolerates. pt asked to pivot to stretcher. PT assisted and asst x 2 but pt able to stand and pivot to stretcher. pt unable to stand up straight and fatigued easily so purewick in place with bumex IV scheduled and enc pt that we will d/c purewick tomorrow as strength returns. suzanne diet well. Pt monitoring her blood sugar via Dexcom G7 and alerted me when bg was going down, had OJ and PB with crackers for rescue snack prior to low episode and bg stablized. finger sticks to treat with meal and SSI.

## 2024-08-26 NOTE — PLAN OF CARE
Goal Outcome Evaluation:            Pt up on eob waiting for asst to transport gurney. Pt requested rwx rather than stand pivot. Pt mod of 2 STS to rwx and able to turn to gurney with min x2 flexed posture, shuffle pattern. Sit to supine with min asst for BLE . PT tolerated well. Encouraged to work on ankle df rom.

## 2024-08-27 LAB
ALBUMIN SERPL ELPH-MCNC: 2.6 G/DL (ref 2.9–4.4)
ALBUMIN SERPL-MCNC: 3.2 G/DL (ref 3.5–5.2)
ALBUMIN UR-MCNC: 219.4 MG/DL
ALBUMIN/GLOB SERPL: 0.7 {RATIO} (ref 0.7–1.7)
ALPHA1 GLOB SERPL ELPH-MCNC: 0.3 G/DL (ref 0–0.4)
ALPHA2 GLOB SERPL ELPH-MCNC: 1.1 G/DL (ref 0.4–1)
ANA SER QL: POSITIVE
ANION GAP SERPL CALCULATED.3IONS-SCNC: 11 MMOL/L (ref 5–15)
B-GLOBULIN SERPL ELPH-MCNC: 1.2 G/DL (ref 0.7–1.3)
BUN SERPL-MCNC: 32 MG/DL (ref 8–23)
BUN/CREAT SERPL: 14.8 (ref 7–25)
CALCIUM SPEC-SCNC: 8.4 MG/DL (ref 8.6–10.5)
CENTROMERE B AB SER-ACNC: <0.2 AI (ref 0–0.9)
CHLORIDE SERPL-SCNC: 105 MMOL/L (ref 98–107)
CHROMATIN AB SERPL-ACNC: <0.2 AI (ref 0–0.9)
CO2 SERPL-SCNC: 22 MMOL/L (ref 22–29)
CREAT SERPL-MCNC: 2.16 MG/DL (ref 0.57–1)
CREAT UR-MCNC: 69.2 MG/DL
CREAT UR-MCNC: 69.2 MG/DL
DEPRECATED RDW RBC AUTO: 46.1 FL (ref 37–54)
DSDNA AB SER-ACNC: <1 IU/ML (ref 0–9)
EGFRCR SERPLBLD CKD-EPI 2021: 22.1 ML/MIN/1.73
ENA JO1 AB SER-ACNC: <0.2 AI (ref 0–0.9)
ENA RNP AB SER-ACNC: 0.3 AI (ref 0–0.9)
ENA SCL70 AB SER-ACNC: 4 AI (ref 0–0.9)
ENA SM AB SER-ACNC: <0.2 AI (ref 0–0.9)
ENA SM+RNP AB SER-ACNC: <0.2 AI (ref 0–0.9)
ENA SS-A AB SER-ACNC: <0.2 AI (ref 0–0.9)
ENA SS-B AB SER-ACNC: <0.2 AI (ref 0–0.9)
ERYTHROCYTE [DISTWIDTH] IN BLOOD BY AUTOMATED COUNT: 14.9 % (ref 12.3–15.4)
FOLATE SERPL-MCNC: 8.48 NG/ML (ref 4.78–24.2)
GAMMA GLOB SERPL ELPH-MCNC: 1.5 G/DL (ref 0.4–1.8)
GLOBULIN SER CALC-MCNC: 4 G/DL (ref 2.2–3.9)
GLUCOSE BLDC GLUCOMTR-MCNC: 134 MG/DL (ref 70–130)
GLUCOSE BLDC GLUCOMTR-MCNC: 135 MG/DL (ref 70–130)
GLUCOSE BLDC GLUCOMTR-MCNC: 187 MG/DL (ref 70–130)
GLUCOSE BLDC GLUCOMTR-MCNC: 244 MG/DL (ref 70–130)
GLUCOSE BLDC GLUCOMTR-MCNC: 261 MG/DL (ref 70–130)
GLUCOSE SERPL-MCNC: 178 MG/DL (ref 65–99)
HCT VFR BLD AUTO: 30.5 % (ref 34–46.6)
HGB BLD-MCNC: 9.8 G/DL (ref 12–15.9)
IGA SERPL-MCNC: 325 MG/DL (ref 64–422)
IGG SERPL-MCNC: 1509 MG/DL (ref 586–1602)
IGM SERPL-MCNC: 83 MG/DL (ref 26–217)
KAPPA LC FREE SER-MCNC: 160.4 MG/L (ref 3.3–19.4)
KAPPA LC FREE/LAMBDA FREE SER: 1.58 {RATIO} (ref 0.26–1.65)
LABORATORY COMMENT REPORT: ABNORMAL
LAMBDA LC FREE SERPL-MCNC: 101.4 MG/L (ref 5.7–26.3)
Lab: ABNORMAL
M PROTEIN SERPL ELPH-MCNC: ABNORMAL G/DL
MAGNESIUM SERPL-MCNC: 2.2 MG/DL (ref 1.6–2.4)
MCH RBC QN AUTO: 27.5 PG (ref 26.6–33)
MCHC RBC AUTO-ENTMCNC: 32.1 G/DL (ref 31.5–35.7)
MCV RBC AUTO: 85.4 FL (ref 79–97)
MICROALBUMIN/CREAT UR: 3170.5 MG/G (ref 0–29)
PHOSPHATE SERPL-MCNC: 4.8 MG/DL (ref 2.5–4.5)
PLATELET # BLD AUTO: 337 10*3/MM3 (ref 140–450)
PMV BLD AUTO: 9 FL (ref 6–12)
POTASSIUM SERPL-SCNC: 3.7 MMOL/L (ref 3.5–5.2)
PROT ?TM UR-MCNC: 409.4 MG/DL
PROT PATTERN SERPL ELPH-IMP: ABNORMAL
PROT PATTERN SERPL IFE-IMP: NORMAL
PROT SERPL-MCNC: 6.6 G/DL (ref 6–8.5)
PROT/CREAT UR: 5916.2 MG/G CREA (ref 0–200)
RBC # BLD AUTO: 3.57 10*6/MM3 (ref 3.77–5.28)
RIBOSOMAL P AB SER-ACNC: <0.2 AI (ref 0–0.9)
SODIUM SERPL-SCNC: 138 MMOL/L (ref 136–145)
T3FREE SERPL-MCNC: 1.38 PG/ML (ref 2–4.4)
T4 FREE SERPL-MCNC: 0.84 NG/DL (ref 0.92–1.68)
TSH SERPL DL<=0.05 MIU/L-ACNC: 21 UIU/ML (ref 0.27–4.2)
VANCOMYCIN SERPL-MCNC: 16.3 MCG/ML (ref 5–40)
VIT B12 BLD-MCNC: 447 PG/ML (ref 211–946)
WBC NRBC COR # BLD AUTO: 8.8 10*3/MM3 (ref 3.4–10.8)

## 2024-08-27 PROCEDURE — 25010000002 HEPARIN (PORCINE) PER 1000 UNITS: Performed by: INTERNAL MEDICINE

## 2024-08-27 PROCEDURE — 80069 RENAL FUNCTION PANEL: CPT | Performed by: INTERNAL MEDICINE

## 2024-08-27 PROCEDURE — 82746 ASSAY OF FOLIC ACID SERUM: CPT | Performed by: STUDENT IN AN ORGANIZED HEALTH CARE EDUCATION/TRAINING PROGRAM

## 2024-08-27 PROCEDURE — 84443 ASSAY THYROID STIM HORMONE: CPT | Performed by: INTERNAL MEDICINE

## 2024-08-27 PROCEDURE — 63710000001 INSULIN GLARGINE PER 5 UNITS: Performed by: INTERNAL MEDICINE

## 2024-08-27 PROCEDURE — 84481 FREE ASSAY (FT-3): CPT | Performed by: STUDENT IN AN ORGANIZED HEALTH CARE EDUCATION/TRAINING PROGRAM

## 2024-08-27 PROCEDURE — 25010000002 BUMETANIDE PER 0.5 MG: Performed by: INTERNAL MEDICINE

## 2024-08-27 PROCEDURE — 83735 ASSAY OF MAGNESIUM: CPT | Performed by: STUDENT IN AN ORGANIZED HEALTH CARE EDUCATION/TRAINING PROGRAM

## 2024-08-27 PROCEDURE — 82043 UR ALBUMIN QUANTITATIVE: CPT

## 2024-08-27 PROCEDURE — 82607 VITAMIN B-12: CPT | Performed by: STUDENT IN AN ORGANIZED HEALTH CARE EDUCATION/TRAINING PROGRAM

## 2024-08-27 PROCEDURE — 82948 REAGENT STRIP/BLOOD GLUCOSE: CPT

## 2024-08-27 PROCEDURE — 85027 COMPLETE CBC AUTOMATED: CPT | Performed by: STUDENT IN AN ORGANIZED HEALTH CARE EDUCATION/TRAINING PROGRAM

## 2024-08-27 PROCEDURE — 25010000002 HYDRALAZINE PER 20 MG: Performed by: STUDENT IN AN ORGANIZED HEALTH CARE EDUCATION/TRAINING PROGRAM

## 2024-08-27 PROCEDURE — 82570 ASSAY OF URINE CREATININE: CPT

## 2024-08-27 PROCEDURE — 80202 ASSAY OF VANCOMYCIN: CPT | Performed by: STUDENT IN AN ORGANIZED HEALTH CARE EDUCATION/TRAINING PROGRAM

## 2024-08-27 PROCEDURE — 84439 ASSAY OF FREE THYROXINE: CPT | Performed by: STUDENT IN AN ORGANIZED HEALTH CARE EDUCATION/TRAINING PROGRAM

## 2024-08-27 PROCEDURE — 63710000001 INSULIN LISPRO (HUMAN) PER 5 UNITS: Performed by: INTERNAL MEDICINE

## 2024-08-27 PROCEDURE — 25010000002 VANCOMYCIN PER 500 MG: Performed by: INTERNAL MEDICINE

## 2024-08-27 PROCEDURE — 84156 ASSAY OF PROTEIN URINE: CPT

## 2024-08-27 PROCEDURE — 25010000002 CEFAZOLIN PER 500 MG: Performed by: STUDENT IN AN ORGANIZED HEALTH CARE EDUCATION/TRAINING PROGRAM

## 2024-08-27 RX ORDER — DOXYCYCLINE 100 MG/1
100 CAPSULE ORAL EVERY 12 HOURS SCHEDULED
Status: COMPLETED | OUTPATIENT
Start: 2024-08-28 | End: 2024-08-31

## 2024-08-27 RX ORDER — SPIRONOLACTONE 50 MG/1
50 TABLET, FILM COATED ORAL DAILY
Status: DISCONTINUED | OUTPATIENT
Start: 2024-08-28 | End: 2024-09-04 | Stop reason: HOSPADM

## 2024-08-27 RX ORDER — FERROUS SULFATE 325(65) MG
325 TABLET ORAL EVERY OTHER DAY
Status: DISCONTINUED | OUTPATIENT
Start: 2024-08-27 | End: 2024-09-04 | Stop reason: HOSPADM

## 2024-08-27 RX ORDER — LEVOTHYROXINE SODIUM 137 UG/1
137 TABLET ORAL
Status: DISCONTINUED | OUTPATIENT
Start: 2024-08-28 | End: 2024-08-27

## 2024-08-27 RX ORDER — BUMETANIDE 0.25 MG/ML
2 INJECTION INTRAMUSCULAR; INTRAVENOUS 3 TIMES DAILY
Status: DISCONTINUED | OUTPATIENT
Start: 2024-08-27 | End: 2024-08-29

## 2024-08-27 RX ORDER — LEVOTHYROXINE SODIUM 125 UG/1
125 TABLET ORAL
Status: DISCONTINUED | OUTPATIENT
Start: 2024-08-28 | End: 2024-08-27

## 2024-08-27 RX ORDER — LEVOTHYROXINE SODIUM 25 UG/1
25 TABLET ORAL ONCE
Status: COMPLETED | OUTPATIENT
Start: 2024-08-27 | End: 2024-08-27

## 2024-08-27 RX ORDER — LEVOTHYROXINE SODIUM 137 UG/1
137 TABLET ORAL
Status: DISCONTINUED | OUTPATIENT
Start: 2024-08-28 | End: 2024-09-04 | Stop reason: HOSPADM

## 2024-08-27 RX ADMIN — INSULIN LISPRO 10 UNITS: 100 INJECTION, SOLUTION INTRAVENOUS; SUBCUTANEOUS at 13:32

## 2024-08-27 RX ADMIN — PREGABALIN 50 MG: 50 CAPSULE ORAL at 09:21

## 2024-08-27 RX ADMIN — INSULIN LISPRO 10 UNITS: 100 INJECTION, SOLUTION INTRAVENOUS; SUBCUTANEOUS at 18:09

## 2024-08-27 RX ADMIN — Medication 1 APPLICATION: at 09:12

## 2024-08-27 RX ADMIN — BUMETANIDE 2 MG: 0.25 INJECTION INTRAMUSCULAR; INTRAVENOUS at 16:31

## 2024-08-27 RX ADMIN — HYDROCODONE BITARTRATE AND ACETAMINOPHEN 1 TABLET: 5; 325 TABLET ORAL at 16:31

## 2024-08-27 RX ADMIN — VANCOMYCIN HYDROCHLORIDE 500 MG: 500 INJECTION, POWDER, LYOPHILIZED, FOR SOLUTION INTRAVENOUS at 11:55

## 2024-08-27 RX ADMIN — Medication 1 APPLICATION: at 21:33

## 2024-08-27 RX ADMIN — CEFAZOLIN 2000 MG: 2 INJECTION, POWDER, FOR SOLUTION INTRAVENOUS at 22:49

## 2024-08-27 RX ADMIN — INSULIN LISPRO 8 UNITS: 100 INJECTION, SOLUTION INTRAVENOUS; SUBCUTANEOUS at 13:33

## 2024-08-27 RX ADMIN — AMLODIPINE BESYLATE 2.5 MG: 2.5 TABLET ORAL at 09:09

## 2024-08-27 RX ADMIN — DULOXETINE HYDROCHLORIDE 30 MG: 30 CAPSULE, DELAYED RELEASE ORAL at 09:09

## 2024-08-27 RX ADMIN — HYDROCODONE BITARTRATE AND ACETAMINOPHEN 1 TABLET: 5; 325 TABLET ORAL at 22:49

## 2024-08-27 RX ADMIN — BUMETANIDE 2 MG: 0.25 INJECTION INTRAMUSCULAR; INTRAVENOUS at 21:31

## 2024-08-27 RX ADMIN — PANTOPRAZOLE SODIUM 40 MG: 40 TABLET, DELAYED RELEASE ORAL at 06:36

## 2024-08-27 RX ADMIN — INSULIN GLARGINE 60 UNITS: 100 INJECTION, SOLUTION SUBCUTANEOUS at 21:46

## 2024-08-27 RX ADMIN — HYDROCODONE BITARTRATE AND ACETAMINOPHEN 1 TABLET: 5; 325 TABLET ORAL at 06:38

## 2024-08-27 RX ADMIN — HYDRALAZINE HYDROCHLORIDE 25 MG: 25 TABLET ORAL at 06:36

## 2024-08-27 RX ADMIN — Medication 3 ML: at 21:33

## 2024-08-27 RX ADMIN — INSULIN LISPRO 3 UNITS: 100 INJECTION, SOLUTION INTRAVENOUS; SUBCUTANEOUS at 09:10

## 2024-08-27 RX ADMIN — LEVOTHYROXINE SODIUM 25 MCG: 25 TABLET ORAL at 13:33

## 2024-08-27 RX ADMIN — HYDRALAZINE HYDROCHLORIDE 25 MG: 25 TABLET ORAL at 21:30

## 2024-08-27 RX ADMIN — MUPIROCIN 1 APPLICATION: 20 OINTMENT TOPICAL at 09:13

## 2024-08-27 RX ADMIN — FERROUS SULFATE TAB 325 MG (65 MG ELEMENTAL FE) 325 MG: 325 (65 FE) TAB at 14:16

## 2024-08-27 RX ADMIN — CEFAZOLIN 2000 MG: 2 INJECTION, POWDER, FOR SOLUTION INTRAVENOUS at 11:56

## 2024-08-27 RX ADMIN — PREGABALIN 50 MG: 50 CAPSULE ORAL at 21:30

## 2024-08-27 RX ADMIN — HYDRALAZINE HYDROCHLORIDE 10 MG: 20 INJECTION INTRAMUSCULAR; INTRAVENOUS at 14:16

## 2024-08-27 RX ADMIN — HEPARIN SODIUM 5000 UNITS: 5000 INJECTION INTRAVENOUS; SUBCUTANEOUS at 09:08

## 2024-08-27 RX ADMIN — Medication 1 CAPSULE: at 09:09

## 2024-08-27 RX ADMIN — INSULIN LISPRO 10 UNITS: 100 INJECTION, SOLUTION INTRAVENOUS; SUBCUTANEOUS at 09:09

## 2024-08-27 RX ADMIN — Medication 2.5 MG: at 21:30

## 2024-08-27 RX ADMIN — HEPARIN SODIUM 5000 UNITS: 5000 INJECTION INTRAVENOUS; SUBCUTANEOUS at 21:33

## 2024-08-27 RX ADMIN — ATORVASTATIN CALCIUM 80 MG: 80 TABLET, FILM COATED ORAL at 21:32

## 2024-08-27 RX ADMIN — LEVOTHYROXINE SODIUM 112 MCG: 112 TABLET ORAL at 05:10

## 2024-08-27 RX ADMIN — BENZOCAINE: 0.1 GEL TOPICAL at 09:12

## 2024-08-27 RX ADMIN — BISOPROLOL FUMARATE 5 MG: 5 TABLET, FILM COATED ORAL at 09:09

## 2024-08-27 RX ADMIN — BUMETANIDE 2 MG: 0.25 INJECTION INTRAMUSCULAR; INTRAVENOUS at 09:12

## 2024-08-27 RX ADMIN — HYDROCODONE BITARTRATE AND ACETAMINOPHEN 1 TABLET: 5; 325 TABLET ORAL at 00:05

## 2024-08-27 RX ADMIN — SPIRONOLACTONE 25 MG: 25 TABLET ORAL at 09:08

## 2024-08-27 NOTE — PROGRESS NOTES
Name: Halie Guidry ADMIT: 2024   : 1940  PCP: Napoleon Mead MD    MRN: [id number] LOS: 2 days   AGE/SEX: 84 y.o. female  ROOM: [facility]     Subjective   Subjective   Continues to feel better.  Lower extremity swelling continues to improve.  Denies fevers or chills.  Moderate and IV antibiotics.    Review of Systems   As above  Objective   Objective   Vital Signs  Temp:  [96.8 °F (36 °C)-98 °F (36.7 °C)] 96.8 °F (36 °C)  Heart Rate:  [58-66] 61  Resp:  [16-18] 18  BP: (153-170)/(60-78) 170/60  SpO2:  [93 %-97 %] 95 %  on   ;   Device (Oxygen Therapy): room air  Body mass index is 32.88 kg/m².  Physical Exam    General: Alert, laying in bed, not in distress, chronically ill-appearing  HEENT: Normocephalic, atraumatic  CV: Regular rate and rhythm, no murmurs   Lungs: CTA anteriorly, no wheezing  Abdomen: Soft, nontender, nondistended  Extremities: Trace-1+ bilateral lower extremity edema, bilateral lower extremity dressing in place, no significant erythema    Results Review     I reviewed the patient's new clinical results.  Results from last 7 days   Lab Units 24  0615 24  0540 24  0539 24  1733   WBC 10*3/mm3 8.80 9.05 12.89* 10.35   HEMOGLOBIN g/dL 9.8* 9.5* 9.5* 10.2*   PLATELETS 10*3/mm3 337 314 341 337     Results from last 7 days   Lab Units 24  0615 24  0540 24  1626 24  0539 24  1733   SODIUM mmol/L 138 134*  --  141 140   POTASSIUM mmol/L 3.7 3.6 4.1 3.1* 3.6   CHLORIDE mmol/L 105 100  --  105 104   CO2 mmol/L 22.0 21.8*  --  24.0 24.0   BUN mg/dL 32* 24*  --  22 20   CREATININE mg/dL 2.16* 2.27*  --  1.85* 1.78*   GLUCOSE mg/dL 178* 175*  --  166* 261*   Estimated Creatinine Clearance: 23.8 mL/min (A) (by C-G formula based on SCr of 2.16 mg/dL (H)).  Results from last 7 days   Lab Units 24  0615 24  0540   ALBUMIN g/dL 3.2* 3.2*   BILIRUBIN mg/dL  --  <0.2   ALK PHOS U/L  --  130*   AST (SGOT) U/L  --  12   ALT (SGPT)  U/L  --  <5     Results from last 7 days   Lab Units 08/27/24  0615 08/26/24  0540 08/25/24  0539 08/24/24  1733   CALCIUM mg/dL 8.4* 8.3* 8.2* 8.8   ALBUMIN g/dL 3.2* 3.2*  --   --    MAGNESIUM mg/dL 2.2 1.6 1.8 1.8   PHOSPHORUS mg/dL 4.8* 4.2  --   --      Results from last 7 days   Lab Units 08/24/24 2002   LACTATE mmol/L 1.2     COVID19   Date Value Ref Range Status   01/27/2024 Not Detected Not Detected - Ref. Range Final   01/09/2024 Detected (C) Not Detected - Ref. Range Final     Hemoglobin A1C   Date/Time Value Ref Range Status   08/24/2024 1733 7.90 (H) 4.80 - 5.60 % Final     Glucose   Date/Time Value Ref Range Status   08/27/2024 1035 261 (H) 70 - 130 mg/dL Final   08/27/2024 0626 187 (H) 70 - 130 mg/dL Final   08/27/2024 0034 244 (H) 70 - 130 mg/dL Final   08/26/2024 2117 303 (H) 70 - 130 mg/dL Final   08/26/2024 1744 131 (H) 70 - 130 mg/dL Final   08/26/2024 1108 248 (H) 70 - 130 mg/dL Final   08/26/2024 0544 175 (H) 70 - 130 mg/dL Final           XR Chest PA & Lateral  Narrative: CHEST: 2 VIEWS     HISTORY: Dyspnea     COMPARISON: AP chest 01/27/2024, 01/09/2024, AP chest 05/13/2023.     FINDINGS: Heart size is mildly enlarged. There is increased linear  opacity in the left lung base that is most likely subsegmental  atelectasis but could be related to scarring. There is a calcified  nodule in the right midlung. Lung volumes are diminished. There is no  evidence for pulmonary edema. There is healed or healing right lateral  rib fracture with adjacent pleural thickening and this fracture was  demonstrated on previous PET/CT 06/17/2024.      Impression: Diminished lung volumes with left basilar subsegmental  atelectasis or scarring.     This report was finalized on 8/26/2024 6:27 PM by Blake Murphy M.D  on Workstation: [id number]     Adult Transthoracic Echo Complete W/ Cont if Necessary Per Protocol    Left ventricular systolic function is normal. Calculated left   ventricular EF = 61.8% Normal  left ventricular cavity size noted. Left   ventricular wall thickness is consistent with moderate concentric   hypertrophy. All left ventricular wall segments contract normally. Left   ventricular diastolic function was normal.    The left atrial cavity is moderately dilated.    There is moderate calcification of the aortic valve. The aortic valve   appears trileaflet. Trace aortic valve regurgitation is present. No aortic   valve stenosis is present.    Moderate mitral annular calcification is present. Mild mitral valve   regurgitation is present with multiple jets noted. No significant mitral   valve stenosis is present.    Scheduled Medications  amLODIPine, 2.5 mg, Oral, Daily  atorvastatin, 80 mg, Oral, Nightly  bisoprolol, 5 mg, Oral, Daily  bumetanide, 2 mg, Intravenous, TID  ceFAZolin, 2,000 mg, Intravenous, Q12H  DULoxetine, 30 mg, Oral, Daily  heparin (porcine), 5,000 Units, Subcutaneous, Q12H  hydrALAZINE, 25 mg, Oral, Q8H  hydrocortisone-bacitracin-zinc oxide-nystatin, 1 Application, Topical, BID  insulin glargine, 60 Units, Subcutaneous, Nightly  insulin lispro, 10 Units, Subcutaneous, TID AC  insulin lispro, 3-14 Units, Subcutaneous, 4x Daily AC & at Bedtime  lactobacillus acidophilus, 1 capsule, Oral, Daily  levothyroxine, 112 mcg, Oral, Q AM  melatonin, 2.5 mg, Oral, Nightly  mupirocin, 1 Application, Topical, Daily  pantoprazole, 40 mg, Oral, Q AM  pregabalin, 50 mg, Oral, BID  sodium chloride, 3 mL, Intravenous, Q12H  spironolactone, 25 mg, Oral, Daily  vancomycin, 500 mg, Intravenous, Once  Vancomycin Pharmacy Intermittent/Pulse Dosing, , Does not apply, Daily    Infusions  Pharmacy to dose vancomycin,     Diet  Diet: Diabetic, Cardiac; Healthy Heart (2-3 Na+); Consistent Carbohydrate; Fluid Consistency: Thin (IDDSI 0)    I have personally reviewed     [x]  Laboratory   [x]  Microbiology   []  Radiology   []  EKG/Telemetry  []  Cardiology/Vascular   []  Pathology    []  Records        Assessment/Plan     Active Hospital Problems    Diagnosis  POA    **Cellulitis of right lower extremity [L03.115]  Yes    Bilateral cellulitis of lower leg [L03.116, L03.115]  Yes    Bilateral lower extremity edema [R60.0]  Yes    CKD (chronic kidney disease) stage 4, GFR 15-29 ml/min [N18.4]  Yes    Acute CHF [I50.9]  Yes    Anemia due to chronic kidney disease [N18.9, D63.1]  Yes    Primary malignant neuroendocrine tumor of pancreas [C7A.8]  Yes    Peripheral edema [R60.0]  Yes    Accelerated hypertension [I10]  Yes    Generalized weakness [R53.1]  Yes    Hyperlipidemia [E78.5]  Yes    Type 2 diabetes mellitus with hyperglycemia, with long-term current use of insulin [E11.65, Z79.4]  Not Applicable    Neuropathy [G62.9]  Yes    Benign essential hypertension [I10]  Yes      Resolved Hospital Problems   No resolved problems to display.     -year-old female presents the hospital with concern for leg cellulitis right greater than left with multiple leg wounds.     Cellulitis of the leg right greater than left with bilateral leg wounds:  -Wound drainage has a yellow appearance concerning for possible impetigo.   -On IV vancomycin, cefazolin.  Will plan for 7-day course of antibiotics.  Will continue IV cefazolin, transition to p.o. doxycycline tomorrow for 4 more days to complete a 7-day course of antibiotics  --Wound care  -Swelling/erythema improving     Acute CHF, anasarca:  -Echocardiogram 08/26/2024 EF of 61.8%,  -On IV Bumex nephrology managing     CKD 4:  -Previous records reviewed and baseline creatinine 1.7-2.0.    -Protein to creatinine ratio 7.974, nephrotic range, likely secondary to diabetes  -Creatinine stable, nephrology managing    Anemia  -Chart review iron panel last year was consistent with iron deficient anemia  -Repeat iron panel 08/26/2024 mixed, consistent with anemia of chronic disease and iron deficiency anemia  -Initiated on p.o. iron every other day  -Hemoglobin stable      Type 2 diabetes:    -A1c 7.9.  Monitor blood sugar and adjust insulin as needed.    -Blood glucose stable on current regimen  -Continue Lantus 70 units nightly, scheduled lispro 10 units 3 times daily with meals, SSI       Essential hypertension:   BP improving, nephrology adjusting meds.      Neuropathy:   Decrease Lyrica dose due to CKD.  Continue low-dose chronic hydrocodone.     Neuroendocrine tumor:   Reportedly receives management currently with oncology.     Hyperlipidemia: Continue statin.  Stable.     Hypokalemia  -WNL    Hypothyroidism  -TSH elevated at 21, free T4 and free T3 low 08/27/2024  -Increase levothyroxine to 137 mcg daily from 112 mcg daily  -Will need repeat labs in 4 weeks      Heparin SC for DVT prophylaxis.  Limited code (no CPR, no intubation).  Disposition: SNF, timing to be determined, likely stable to discharge in 1 to 2 days  Expected Discharge Date: 8/28/2024; Expected Discharge Time:        Copied text in this note has been reviewed and is accurate as of 08/27/24.         Dictated utilizing Dragon dictation        Balbina Painting MD  [facility]  08/27/24  11:25 EDT

## 2024-08-27 NOTE — SIGNIFICANT NOTE
08/27/24 1417   OTHER   Discipline occupational therapist   Rehab Time/Intention   Session Not Performed patient/family declined treatment  (Pt sitting UIC upon arrival. Reports she is not feeling well this afternoon and requests for OT to come back tomorrow. OT will follow up)   Recommendation   OT - Next Appointment 08/28/24

## 2024-08-27 NOTE — PLAN OF CARE
Goal Outcome Evaluation:  B/P elevated, hydralazine given, up in chair for most of day, up to BSC wioth assist of 2, BM x 2, purewick used at times due to weakness, B/L lower extremity edema, lower legs pink, dressings changed to lower extremity wounds, scant serous drainage, tolerated well, medicated for pain x 1 with good relief, falls protocol, bed/chair alarm in use, Pro Plus bed  Plan of Care Reviewed With: patient

## 2024-08-27 NOTE — PROGRESS NOTES
[facility] Progress Note      Patient Name: Halie Guidry  : 1940  MRN: [id number]  Primary Care Physician:  Napoleon Mead MD  Date of admission: 2024    Subjective     Interval History:   Follow-up CKD 4.  Improved diuresis on IV Bumex.  Weight does not reflect diuresis.  Dr. Painting has increased her thyroid replacement today.  Pressure remains poorly controlled.  Does not feel well overall.  Review of Systems:   As noted above    Objective     Vitals:   Temp:  [96.8 °F (36 °C)-98 °F (36.7 °C)] 96.8 °F (36 °C)  Heart Rate:  [58-66] 61  Resp:  [16-18] 18  BP: (153-170)/(60-78) 170/60    Intake/Output Summary (Last 24 hours) at 2024 1239  Last data filed at 2024 1100  Gross per 24 hour   Intake 640 ml   Output 1000 ml   Net -360 ml       Physical Exam:    General Appearance: alert, oriented x 3, no acute distress.  Chronically ill.  Sitting up in the chair.  Skin: warm and dry  HEENT: oral mucosa normal, nonicteric sclera  Neck: supple, no JVD  Lungs: Clear to auscultation anteriorly.  Heart: RRR, normal S1 and S2  Abdomen: soft, nontender, nondistended. +BS  : no palpable bladder  Extremities: 1+ lower extremity edema.  Both legs wrapped.  Neuro: normal speech and mental status     Scheduled Meds:     amLODIPine, 2.5 mg, Oral, Daily  atorvastatin, 80 mg, Oral, Nightly  bisoprolol, 5 mg, Oral, Daily  bumetanide, 2 mg, Intravenous, TID  ceFAZolin, 2,000 mg, Intravenous, Q12H  DULoxetine, 30 mg, Oral, Daily  ferrous sulfate, 325 mg, Oral, Every Other Day  heparin (porcine), 5,000 Units, Subcutaneous, Q12H  hydrALAZINE, 25 mg, Oral, Q8H  hydrocortisone-bacitracin-zinc oxide-nystatin, 1 Application, Topical, BID  insulin glargine, 60 Units, Subcutaneous, Nightly  insulin lispro, 10 Units, Subcutaneous, TID AC  insulin lispro, 3-14 Units, Subcutaneous, 4x Daily AC & at Bedtime  lactobacillus acidophilus, 1 capsule, Oral, Daily  [START ON 2024]  levothyroxine, 125 mcg, Oral, Q AM  levothyroxine, 25 mcg, Oral, Once  melatonin, 2.5 mg, Oral, Nightly  mupirocin, 1 Application, Topical, Daily  pantoprazole, 40 mg, Oral, Q AM  pregabalin, 50 mg, Oral, BID  sodium chloride, 3 mL, Intravenous, Q12H  spironolactone, 25 mg, Oral, Daily  Vancomycin Pharmacy Intermittent/Pulse Dosing, , Does not apply, Daily      IV Meds:   Pharmacy to dose vancomycin,         Results Reviewed:   I have personally reviewed the results from the time of this admission to 8/27/2024 12:39 EDT     Results from last 7 days   Lab Units 08/27/24  0615 08/26/24  0540 08/25/24  1626 08/25/24  0539   SODIUM mmol/L 138 134*  --  141   POTASSIUM mmol/L 3.7 3.6 4.1 3.1*   CHLORIDE mmol/L 105 100  --  105   CO2 mmol/L 22.0 21.8*  --  24.0   BUN mg/dL 32* 24*  --  22   CREATININE mg/dL 2.16* 2.27*  --  1.85*   CALCIUM mg/dL 8.4* 8.3*  --  8.2*   BILIRUBIN mg/dL  --  <0.2  --   --    ALK PHOS U/L  --  130*  --   --    ALT (SGPT) U/L  --  <5  --   --    AST (SGOT) U/L  --  12  --   --    GLUCOSE mg/dL 178* 175*  --  166*       Estimated Creatinine Clearance: 23.8 mL/min (A) (by C-G formula based on SCr of 2.16 mg/dL (H)).    Results from last 7 days   Lab Units 08/27/24  0615 08/26/24  0540 08/25/24  0539   MAGNESIUM mg/dL 2.2 1.6 1.8   PHOSPHORUS mg/dL 4.8* 4.2  --              Results from last 7 days   Lab Units 08/27/24  0615 08/26/24  0540 08/25/24  0539 08/24/24  1733   WBC 10*3/mm3 8.80 9.05 12.89* 10.35   HEMOGLOBIN g/dL 9.8* 9.5* 9.5* 10.2*   PLATELETS 10*3/mm3 337 314 341 337             Assessment / Plan     ASSESSMENT:  CKD 4 baseline creatinine 2.  Chronic cardiorenal syndrome.  Creatinine very stable and hyponatremia resolved.  2.  Heart failure preserved ejection fraction diuresing on IV Bumex and oral Aldactone.  Weight does not reflect diuresis.  3.  Right lower extremity cellulitis on Vanco and Kefzol.  4.  Hypertension poorly controlled.  5.  Anemia CKD.  6.  Diabetes mellitus type  II.  7.  Hypothyroid inadequately replaced.  Likely adding to her heart failure decompensation.  Supplement increased.  PLAN:  Continue IV Bumex  2.  Increase Aldactone to 50 mg daily.  Thank you for involving us in the care of Haliegopal Guidry.  Please feel free to call with any questions.    Mary Rice MD  08/27/24  12:39 EDT    [facility]  [phone]    Please note that portions of this note were completed with a voice recognition program.

## 2024-08-27 NOTE — PROGRESS NOTES
[facility] Clinical Pharmacy Services: Vancomycin Level Monitoring Note    Halie Guidry is a 84 y.o. female who is on day 3/7 of pharmacy to dose vancomycin for Skin and Soft Tissue.    Estimated Creatinine Clearance: 23.8 mL/min (A) (by C-G formula based on SCr of 2.16 mg/dL (H)).    Current Vanc Dose: intermittent dosing  Results from last 7 days   Lab Units 08/27/24  0615 08/26/24  1137   VANCOMYCIN RM mcg/mL 16.30  --    VANCOMYCIN TR mcg/mL  --  15.90     Renal function remains impaired although slightly improved. Random level this AM resulted as above. Will continue to dose intermittently at this time. Will order vancomycin 500 mg IV x1.    Next Level Date and Time: Vanc Random on 8/28 w/ AM labs.    Pharmacy is continuing to monitor and will adjust as needed.    Brock Burleson [facility]  Clinical Pharmacist

## 2024-08-28 LAB
ALBUMIN SERPL-MCNC: 3.1 G/DL (ref 3.5–5.2)
ANION GAP SERPL CALCULATED.3IONS-SCNC: 14.2 MMOL/L (ref 5–15)
BUN SERPL-MCNC: 36 MG/DL (ref 8–23)
BUN/CREAT SERPL: 16 (ref 7–25)
CALCIUM SPEC-SCNC: 8.5 MG/DL (ref 8.6–10.5)
CHLORIDE SERPL-SCNC: 102 MMOL/L (ref 98–107)
CO2 SERPL-SCNC: 22.8 MMOL/L (ref 22–29)
CREAT SERPL-MCNC: 2.25 MG/DL (ref 0.57–1)
DEPRECATED RDW RBC AUTO: 46.3 FL (ref 37–54)
EGFRCR SERPLBLD CKD-EPI 2021: 21 ML/MIN/1.73
ERYTHROCYTE [DISTWIDTH] IN BLOOD BY AUTOMATED COUNT: 15 % (ref 12.3–15.4)
GLUCOSE BLDC GLUCOMTR-MCNC: 151 MG/DL (ref 70–130)
GLUCOSE BLDC GLUCOMTR-MCNC: 175 MG/DL (ref 70–130)
GLUCOSE BLDC GLUCOMTR-MCNC: 238 MG/DL (ref 70–130)
GLUCOSE BLDC GLUCOMTR-MCNC: 68 MG/DL (ref 70–130)
GLUCOSE BLDC GLUCOMTR-MCNC: 94 MG/DL (ref 70–130)
GLUCOSE SERPL-MCNC: 82 MG/DL (ref 65–99)
HCT VFR BLD AUTO: 31.4 % (ref 34–46.6)
HGB BLD-MCNC: 10 G/DL (ref 12–15.9)
MAGNESIUM SERPL-MCNC: 2 MG/DL (ref 1.6–2.4)
MCH RBC QN AUTO: 27.1 PG (ref 26.6–33)
MCHC RBC AUTO-ENTMCNC: 31.8 G/DL (ref 31.5–35.7)
MCV RBC AUTO: 85.1 FL (ref 79–97)
PHOSPHATE SERPL-MCNC: 5.2 MG/DL (ref 2.5–4.5)
PLATELET # BLD AUTO: 362 10*3/MM3 (ref 140–450)
PMV BLD AUTO: 9.3 FL (ref 6–12)
POTASSIUM SERPL-SCNC: 4.4 MMOL/L (ref 3.5–5.2)
RBC # BLD AUTO: 3.69 10*6/MM3 (ref 3.77–5.28)
SODIUM SERPL-SCNC: 139 MMOL/L (ref 136–145)
WBC NRBC COR # BLD AUTO: 11.16 10*3/MM3 (ref 3.4–10.8)

## 2024-08-28 PROCEDURE — 25010000002 BUMETANIDE PER 0.5 MG: Performed by: INTERNAL MEDICINE

## 2024-08-28 PROCEDURE — 82948 REAGENT STRIP/BLOOD GLUCOSE: CPT

## 2024-08-28 PROCEDURE — 97530 THERAPEUTIC ACTIVITIES: CPT

## 2024-08-28 PROCEDURE — 83735 ASSAY OF MAGNESIUM: CPT | Performed by: STUDENT IN AN ORGANIZED HEALTH CARE EDUCATION/TRAINING PROGRAM

## 2024-08-28 PROCEDURE — 85027 COMPLETE CBC AUTOMATED: CPT | Performed by: STUDENT IN AN ORGANIZED HEALTH CARE EDUCATION/TRAINING PROGRAM

## 2024-08-28 PROCEDURE — 63710000001 INSULIN LISPRO (HUMAN) PER 5 UNITS: Performed by: INTERNAL MEDICINE

## 2024-08-28 PROCEDURE — 80069 RENAL FUNCTION PANEL: CPT | Performed by: INTERNAL MEDICINE

## 2024-08-28 PROCEDURE — 63710000001 INSULIN GLARGINE PER 5 UNITS: Performed by: INTERNAL MEDICINE

## 2024-08-28 PROCEDURE — 25010000002 HEPARIN (PORCINE) PER 1000 UNITS: Performed by: INTERNAL MEDICINE

## 2024-08-28 RX ORDER — HYDRALAZINE HYDROCHLORIDE 50 MG/1
50 TABLET, FILM COATED ORAL EVERY 8 HOURS SCHEDULED
Status: DISCONTINUED | OUTPATIENT
Start: 2024-08-28 | End: 2024-08-29

## 2024-08-28 RX ADMIN — INSULIN LISPRO 10 UNITS: 100 INJECTION, SOLUTION INTRAVENOUS; SUBCUTANEOUS at 13:50

## 2024-08-28 RX ADMIN — HYDRALAZINE HYDROCHLORIDE 50 MG: 50 TABLET ORAL at 13:55

## 2024-08-28 RX ADMIN — MUPIROCIN 1 APPLICATION: 20 OINTMENT TOPICAL at 09:38

## 2024-08-28 RX ADMIN — Medication 1 APPLICATION: at 22:32

## 2024-08-28 RX ADMIN — HYDROCODONE BITARTRATE AND ACETAMINOPHEN 1 TABLET: 5; 325 TABLET ORAL at 05:41

## 2024-08-28 RX ADMIN — PREGABALIN 50 MG: 50 CAPSULE ORAL at 22:48

## 2024-08-28 RX ADMIN — PANTOPRAZOLE SODIUM 40 MG: 40 TABLET, DELAYED RELEASE ORAL at 05:37

## 2024-08-28 RX ADMIN — DOXYCYCLINE 100 MG: 100 CAPSULE ORAL at 09:36

## 2024-08-28 RX ADMIN — HYDRALAZINE HYDROCHLORIDE 25 MG: 25 TABLET ORAL at 05:37

## 2024-08-28 RX ADMIN — Medication 2.5 MG: at 22:30

## 2024-08-28 RX ADMIN — SPIRONOLACTONE 50 MG: 50 TABLET ORAL at 09:36

## 2024-08-28 RX ADMIN — BUMETANIDE 2 MG: 0.25 INJECTION INTRAMUSCULAR; INTRAVENOUS at 22:32

## 2024-08-28 RX ADMIN — Medication 1 APPLICATION: at 09:38

## 2024-08-28 RX ADMIN — INSULIN LISPRO 10 UNITS: 100 INJECTION, SOLUTION INTRAVENOUS; SUBCUTANEOUS at 09:37

## 2024-08-28 RX ADMIN — INSULIN LISPRO 3 UNITS: 100 INJECTION, SOLUTION INTRAVENOUS; SUBCUTANEOUS at 13:53

## 2024-08-28 RX ADMIN — INSULIN GLARGINE 60 UNITS: 100 INJECTION, SOLUTION SUBCUTANEOUS at 22:30

## 2024-08-28 RX ADMIN — DOXYCYCLINE 100 MG: 100 CAPSULE ORAL at 22:30

## 2024-08-28 RX ADMIN — HYDROCODONE BITARTRATE AND ACETAMINOPHEN 1 TABLET: 5; 325 TABLET ORAL at 22:30

## 2024-08-28 RX ADMIN — HEPARIN SODIUM 5000 UNITS: 5000 INJECTION INTRAVENOUS; SUBCUTANEOUS at 22:33

## 2024-08-28 RX ADMIN — SODIUM CHLORIDE 40 ML: 9 INJECTION, SOLUTION INTRAVENOUS at 22:34

## 2024-08-28 RX ADMIN — INSULIN LISPRO 5 UNITS: 100 INJECTION, SOLUTION INTRAVENOUS; SUBCUTANEOUS at 22:47

## 2024-08-28 RX ADMIN — BUMETANIDE 2 MG: 0.25 INJECTION INTRAMUSCULAR; INTRAVENOUS at 18:02

## 2024-08-28 RX ADMIN — LEVOTHYROXINE SODIUM 137 MCG: 137 TABLET ORAL at 05:37

## 2024-08-28 RX ADMIN — DULOXETINE HYDROCHLORIDE 30 MG: 30 CAPSULE, DELAYED RELEASE ORAL at 09:37

## 2024-08-28 RX ADMIN — HYDROCODONE BITARTRATE AND ACETAMINOPHEN 1 TABLET: 5; 325 TABLET ORAL at 13:55

## 2024-08-28 RX ADMIN — INSULIN LISPRO 3 UNITS: 100 INJECTION, SOLUTION INTRAVENOUS; SUBCUTANEOUS at 18:02

## 2024-08-28 RX ADMIN — Medication 3 ML: at 10:16

## 2024-08-28 RX ADMIN — Medication 1 CAPSULE: at 09:37

## 2024-08-28 RX ADMIN — AMLODIPINE BESYLATE 2.5 MG: 2.5 TABLET ORAL at 09:37

## 2024-08-28 RX ADMIN — PREGABALIN 50 MG: 50 CAPSULE ORAL at 09:42

## 2024-08-28 RX ADMIN — INSULIN LISPRO 10 UNITS: 100 INJECTION, SOLUTION INTRAVENOUS; SUBCUTANEOUS at 18:03

## 2024-08-28 RX ADMIN — HEPARIN SODIUM 5000 UNITS: 5000 INJECTION INTRAVENOUS; SUBCUTANEOUS at 09:37

## 2024-08-28 RX ADMIN — BUMETANIDE 2 MG: 0.25 INJECTION INTRAMUSCULAR; INTRAVENOUS at 09:37

## 2024-08-28 RX ADMIN — HYDRALAZINE HYDROCHLORIDE 50 MG: 50 TABLET ORAL at 22:30

## 2024-08-28 RX ADMIN — ATORVASTATIN CALCIUM 80 MG: 80 TABLET, FILM COATED ORAL at 22:30

## 2024-08-28 RX ADMIN — BISOPROLOL FUMARATE 5 MG: 5 TABLET, FILM COATED ORAL at 09:37

## 2024-08-28 NOTE — PLAN OF CARE
Goal Outcome Evaluation:  VSS, up in chair & BSC with assist of 1-2, worked with PT, Purewick in use, still getting IV Bumex, good urine output,  cream applied to reddened folds after bath, dressings to B/L lower extremities changed, scabbed & open areas, bacitracin applied, covered with vaseline guaze & kerlix, medicated for pain x 1 with relief, falls protocol, bed/chair alarm in use, Pro Plus bed  Plan of Care Reviewed With: patient

## 2024-08-28 NOTE — PLAN OF CARE
Goal Outcome Evaluation:  Plan of Care Reviewed With: patient        Progress: improving  Outcome Evaluation: Alert and oriented. VSS. Norco given once for mild BLE pain. IV Fluids at KVO rate, due IV Cefazolin given. External catheter in place wit adequate urine output. Magic barrier cream applied- redness to abdominal fold, groin and under both breasts improving.  Rested well.

## 2024-08-28 NOTE — THERAPY TREATMENT NOTE
Patient Name: Halie Guidry  : 1940    MRN: [id number]                              Today's Date: 2024       Admit Date: 2024    Visit Dx:     ICD-10-CM ICD-9-CM   1. Cellulitis of lower extremity, unspecified laterality  L03.119 682.6   2. Bilateral lower extremity edema  R60.0 782.3   3. Chronic kidney disease, unspecified CKD stage  N18.9 585.9   4. History of diabetes mellitus  Z86.39 V12.29     Patient Active Problem List   Diagnosis    Compression fracture    Lumbar degenerative disc disease    Type 2 diabetes mellitus with hyperglycemia, with long-term current use of insulin    Hyperlipidemia    Benign essential hypertension    Primary hypothyroidism    Neuropathy    Osteoporosis    Proteinuria    Tobacco abuse    Generalized weakness    Spinal stenosis    Scoliosis    Arthritis    VBI (vertebrobasilar insufficiency)    Orthostatic hypotension    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    Severe hypothyroidism    Noncompliance with medication regimen    Vitamin D deficiency disease    Tremor    Seizure    Thoracic degenerative disc disease    Hyperglycemia    Type II diabetes mellitus, uncontrolled    Lower abdominal pain    Transaminitis    Emphysematous cystitis    Choledocholithiasis    Hypokalemia    Hypoxia    Generalized abdominal pain    History of Clostridium difficile infection    History of ERCP    Hypomagnesemia    Anxiety disorder    Neuropathic pain    Intertrigo    Weakness of both lower extremities    Accelerated hypertension    Acute cystitis without hematuria    Left lower lobe pneumonia    Cellulitis and abscess of left lower extremity    Benign hypertension with CKD (chronic kidney disease) stage IV    Peripheral edema    Primary malignant neuroendocrine tumor of pancreas    Encounter for long-term (current) use of high-risk medication    Diabetic foot ulcer    Stage 3a chronic kidney disease    Pressure injury of buttock, stage 2    HTN  (hypertension)    Anemia due to chronic kidney disease    Bilateral lower extremity edema    Cellulitis of right lower extremity    CKD (chronic kidney disease) stage 4, GFR 15-29 ml/min    Acute CHF    Bilateral cellulitis of lower leg     Past Medical History:   Diagnosis Date    Acute metabolic encephalopathy 06/24/2022    Anxiety     Arthritis     Benign essential hypertension 08/20/2014    Bleeding disorder     Depression     Diabetes     Diabetes mellitus, type 2     Disc degeneration, lumbar     Headache, tension-type     Hyperlipidemia     Hypothyroidism     Neuropathy     Osteoporosis 09/09/2015    Pancreatic mass     Sept 2023, on Monthly Octreotide Injection    Peripheral neuropathy     Rotator cuff tear, left     Scoliosis     Shoulder pain     LEFT, TORN ROTATOR CUFF S/P FALL    Spinal stenosis      Past Surgical History:   Procedure Laterality Date    APPENDECTOMY      BILATERAL BREAST REDUCTION Bilateral 08/2015    CATARACT EXTRACTION  03/2015    CHOLECYSTECTOMY WITH INTRAOPERATIVE CHOLANGIOGRAM N/A 3/27/2022    Procedure: CHOLECYSTECTOMY LAPAROSCOPIC INTRAOPERATIVE CHOLANGIOGRAM;  Surgeon: Aiyana Hill MD;  Location: [facility] MAIN OR;  Service: General;  Laterality: N/A;    COLONOSCOPY  06/05/2015    WNL    ERCP N/A 2/25/2022    Procedure: ENDOSCOPIC RETROGRADE CHOLANGIOPANCREATOGRAPHY with sphincterotomy and balloon sweep;  Surgeon: Chilo Wilhelm MD;  Location: [facility] ENDOSCOPY;  Service: Gastroenterology;  Laterality: N/A;  PRE/POST - CBD stones    ERCP N/A 3/28/2022    Procedure: ENDOSCOPIC RETROGRADE CHOLANGIOPANCREATOGRAPHY WITH SPHINCTEROTOMY AND BALLOON SWEEP;  Surgeon: Chilo Wilhelm MD;  Location: [facility] ENDOSCOPY;  Service: Gastroenterology;  Laterality: N/A;  PRE: COMMON DUCT STONE  POST: COMMON DUCT STONE    KYPHOPLASTY      REDUCTION MAMMAPLASTY      TONSILLECTOMY        General Information       Row Name 08/28/24 1622          Physical Therapy Time and Intention     Document Type therapy note (daily note)  -     Mode of Treatment individual therapy;physical therapy  -       Row Name 08/28/24 1620          General Information    Patient Profile Reviewed yes  -     Existing Precautions/Restrictions fall  -     Barriers to Rehab medically complex;previous functional deficit  -       Row Name 08/28/24 1620          Living Environment    People in Home facility resident  [facility]  -       Row Name 08/28/24 1620          Cognition    Orientation Status (Cognition) oriented x 4  -       Row Name 08/28/24 1620          Safety Issues, Functional Mobility    Safety Issues Affecting Function (Mobility) insight into deficits/self-awareness;judgment;problem-solving;safety precaution awareness  -     Impairments Affecting Function (Mobility) balance;endurance/activity tolerance;sensation/sensory awareness;pain;strength  -               User Key  (r) = Recorded By, (t) = Taken By, (c) = Cosigned By      Initials Name Provider Type     Beena Ray PTA Physical Therapist Assistant                   Mobility       Row Name 08/28/24 1623          Bed Mobility    Comment, (Bed Mobility) in chair  -       Row Name 08/28/24 1623          Sit-Stand Transfer    Sit-Stand [place] (Transfers) 2 person assist;moderate assist (50% patient effort);verbal cues;nonverbal cues (demo/gesture)  -     Assistive Device (Sit-Stand Transfers) walker, front-wheeled  -       Row Name 08/28/24 1623          Gait/Stairs (Locomotion)    [place] Level (Gait) 2 person assist;minimum assist (75% patient effort);verbal cues;nonverbal cues (demo/gesture)  -     Assistive Device (Gait) walker, front-wheeled  -     Distance in Feet (Gait) 15  standing rest to complete, offered seated rest in recliner, but pt declined  -     Deviations/Abnormal Patterns (Gait) tess decreased;festinating/shuffling;antalgic;stride length decreased  -     Bilateral Gait Deviations forward flexed  posture;weight shift ability decreased  -     Comment, (Gait/Stairs) reports neuropathy limiting  -               User Key  (r) = Recorded By, (t) = Taken By, (c) = Cosigned By      Initials Name Provider Type    Beena Downs PTA Physical Therapist Assistant                   Obj/Interventions    No documentation.                  Goals/Plan    No documentation.                  Clinical Impression       [facility] Name 08/28/24 1625          Pain    Pretreatment Pain Rating 7/10  -     Posttreatment Pain Rating 7/10  -     Pain Location - Side/Orientation Bilateral  -     Pain Location lower  -     Pain Location - extremity  -     Pre/Posttreatment Pain Comment incr pain w/WB, otherwise better today, decr swelling reported  -     Pain Intervention(s) Repositioned;Ambulation/increased activity;Elevated;Rest  -[facility] Name 08/28/24 1625          Plan of Care Review    Plan of Care Reviewed With patient  -     Progress improving  -     Outcome Evaluation Pt agreed to PT session, pt suzanne STS w/MOD 2, once standing has heavy forw flexed posture, c/o old back injury causing, pt suzanne amb ~15ft w/rwx , assist of 2 for safety, educ on incr forw flex limiting balance, required one standing rest but pt motivated today and declined need for seated rest, was fatigued when back at chair , reclined and elevated LEs on pillow, heels elevated as well, plans possible back to [facility], undecided this date, but suggest HH due to falls risk , decr safety awareness  -[facility] Name 08/28/24 1625          Therapy Assessment/Plan (PT)    Rehab Potential (PT) good, to achieve stated therapy goals  -     Criteria for Skilled Interventions Met (PT) yes  -[facility] Name 08/28/24 1625          Vital Signs    O2 Delivery Pre Treatment room air  -[facility] Name 08/28/24 1625          Positioning and Restraints    Pre-Treatment Position sitting in chair/recliner  -     Post Treatment Position chair  -     In  Chair reclined;call light within reach;encouraged to call for assist;exit alarm on;notified nsg;heels elevated;legs elevated  -JOSE ALBERTO               User Key  (r) = Recorded By, (t) = Taken By, (c) = Cosigned By      Initials Name Provider Type    Beena Downs PTA Physical Therapist Assistant                   Outcome Measures       Row Name 08/28/24 1631 08/28/24 0900       How much help from another person do you currently need...    Turning from your back to your side while in flat bed without using bedrails? 3  -JM 3  -MM    Moving from lying on back to sitting on the side of a flat bed without bedrails? 2  -JM 3  -MM    Moving to and from a bed to a chair (including a wheelchair)? 2  -JM 2  -MM    Standing up from a chair using your arms (e.g., wheelchair, bedside chair)? 2  -JOSE ALBERTO 2  -MM    Climbing 3-5 steps with a railing? 1  -JOSE ALBERTO 1  -MM    To walk in hospital room? 2  -JOSE ALBERTO 1  -MM    AM-PAC 6 Clicks Score (PT) 12  -JOSE ALBERTO 12  -MM    Highest Level of Mobility Goal 4 --> Transfer to chair/commode  - 4 --> Transfer to chair/commode  -MM              User Key  (r) = Recorded By, (t) = Taken By, (c) = Cosigned By      Initials Name Provider Type    Kelsey Hauser, RN Registered Nurse    Beena Downs PTA Physical Therapist Assistant                                 Physical Therapy Education       Title: PT OT SLP Therapies (Done)       Topic: Physical Therapy (Done)       Point: Mobility training (Done)       Learning Progress Summary             Patient Acceptance, E,TB,D, VU,NR by JOSE ALBERTO at 8/28/2024 1631    Acceptance, E, VU by ESPINOZA at 8/25/2024 1435                         Point: Home exercise program (Done)       Learning Progress Summary             Patient Acceptance, E,TB,D, VU,NR by JOSE ALBERTO at 8/28/2024 1631                         Point: Body mechanics (Done)       Learning Progress Summary             Patient Acceptance, E,TB,D, VU,NR by JOSE ALBERTO at 8/28/2024 1631    Acceptance, E, VU by ESPINOZA at 8/25/2024 1435                          Point: Precautions (Done)       Learning Progress Summary             Patient Acceptance, E,TB,D, VU,NR by JOSE ALBERTO at 8/28/2024 1631    Acceptance, E, VU by ESPINOZA at 8/25/2024 1435                                         User Key       Initials Effective Dates Name Provider Type Discipline    JOSE ALBERTO 03/07/18 -  Beena Ray PTA Physical Therapist Assistant PT    JL 01/16/24 -  Kenya Ruiz, PT Physical Therapist PT                  PT Recommendation and Plan     Plan of Care Reviewed With: patient  Progress: improving  Outcome Evaluation: Pt agreed to PT session, pt suzanne STS w/MOD 2, once standing has heavy forw flexed posture, c/o old back injury causing, pt suzanne amb ~15ft w/rwx , assist of 2 for safety, educ on incr forw flex limiting balance, required one standing rest but pt motivated today and declined need for seated rest, was fatigued when back at chair , reclined and elevated LEs on pillow, heels elevated as well, plans possible back to [facility], undecided this date, but suggest HH due to falls risk , decr safety awareness     Time Calculation:         PT Charges       Row Name 08/28/24 1631             Time Calculation    Start Time 1350  -      Stop Time 1410  -      Time Calculation (min) 20 min  -      PT Received On 08/28/24  -      PT - Next Appointment 08/29/24  -                User Key  (r) = Recorded By, (t) = Taken By, (c) = Cosigned By      Initials Name Provider Type    Beena Downs PTA Physical Therapist Assistant                  Therapy Charges for Today       Code Description Service Date Service Provider Modifiers Qty    78160820221 HC PT THERAPEUTIC ACT EA 15 MIN 8/28/2024 Beena Ray PTA GP 1    43882556443 HC PT THER SUPP EA 15 MIN 8/28/2024 Beena Ray PTA GP 1            PT G-Codes  Outcome Measure Options: AM-PAC 6 Clicks Daily Activity (OT)  AM-PAC 6 Clicks Score (PT): 12  AM-PAC 6 Clicks Score (OT): 16  PT Discharge Summary  Anticipated  Discharge Disposition (PT): skilled nursing facility, assisted living, home with home health    Beena Ray, PTA  8/28/2024

## 2024-08-28 NOTE — THERAPY TREATMENT NOTE
Patient Name: Halie Guidry  : 1940    MRN: [id number]                              Today's Date: 2024       Admit Date: 2024    Visit Dx:     ICD-10-CM ICD-9-CM   1. Cellulitis of lower extremity, unspecified laterality  L03.119 682.6   2. Bilateral lower extremity edema  R60.0 782.3   3. Chronic kidney disease, unspecified CKD stage  N18.9 585.9   4. History of diabetes mellitus  Z86.39 V12.29     Patient Active Problem List   Diagnosis    Compression fracture    Lumbar degenerative disc disease    Type 2 diabetes mellitus with hyperglycemia, with long-term current use of insulin    Hyperlipidemia    Benign essential hypertension    Primary hypothyroidism    Neuropathy    Osteoporosis    Proteinuria    Tobacco abuse    Generalized weakness    Spinal stenosis    Scoliosis    Arthritis    VBI (vertebrobasilar insufficiency)    Orthostatic hypotension    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    Severe hypothyroidism    Noncompliance with medication regimen    Vitamin D deficiency disease    Tremor    Seizure    Thoracic degenerative disc disease    Hyperglycemia    Type II diabetes mellitus, uncontrolled    Lower abdominal pain    Transaminitis    Emphysematous cystitis    Choledocholithiasis    Hypokalemia    Hypoxia    Generalized abdominal pain    History of Clostridium difficile infection    History of ERCP    Hypomagnesemia    Anxiety disorder    Neuropathic pain    Intertrigo    Weakness of both lower extremities    Accelerated hypertension    Acute cystitis without hematuria    Left lower lobe pneumonia    Cellulitis and abscess of left lower extremity    Benign hypertension with CKD (chronic kidney disease) stage IV    Peripheral edema    Primary malignant neuroendocrine tumor of pancreas    Encounter for long-term (current) use of high-risk medication    Diabetic foot ulcer    Stage 3a chronic kidney disease    Pressure injury of buttock, stage 2    HTN  (hypertension)    Anemia due to chronic kidney disease    Bilateral lower extremity edema    Cellulitis of right lower extremity    CKD (chronic kidney disease) stage 4, GFR 15-29 ml/min    Acute CHF    Bilateral cellulitis of lower leg     Past Medical History:   Diagnosis Date    Acute metabolic encephalopathy 06/24/2022    Anxiety     Arthritis     Benign essential hypertension 08/20/2014    Bleeding disorder     Depression     Diabetes     Diabetes mellitus, type 2     Disc degeneration, lumbar     Headache, tension-type     Hyperlipidemia     Hypothyroidism     Neuropathy     Osteoporosis 09/09/2015    Pancreatic mass     Sept 2023, on Monthly Octreotide Injection    Peripheral neuropathy     Rotator cuff tear, left     Scoliosis     Shoulder pain     LEFT, TORN ROTATOR CUFF S/P FALL    Spinal stenosis      Past Surgical History:   Procedure Laterality Date    APPENDECTOMY      BILATERAL BREAST REDUCTION Bilateral 08/2015    CATARACT EXTRACTION  03/2015    CHOLECYSTECTOMY WITH INTRAOPERATIVE CHOLANGIOGRAM N/A 3/27/2022    Procedure: CHOLECYSTECTOMY LAPAROSCOPIC INTRAOPERATIVE CHOLANGIOGRAM;  Surgeon: Aiyana Hill MD;  Location: [facility] MAIN OR;  Service: General;  Laterality: N/A;    COLONOSCOPY  06/05/2015    WNL    ERCP N/A 2/25/2022    Procedure: ENDOSCOPIC RETROGRADE CHOLANGIOPANCREATOGRAPHY with sphincterotomy and balloon sweep;  Surgeon: Chilo Wilhelm MD;  Location: [facility] ENDOSCOPY;  Service: Gastroenterology;  Laterality: N/A;  PRE/POST - CBD stones    ERCP N/A 3/28/2022    Procedure: ENDOSCOPIC RETROGRADE CHOLANGIOPANCREATOGRAPHY WITH SPHINCTEROTOMY AND BALLOON SWEEP;  Surgeon: Chilo Wilhelm MD;  Location: [facility] ENDOSCOPY;  Service: Gastroenterology;  Laterality: N/A;  PRE: COMMON DUCT STONE  POST: COMMON DUCT STONE    KYPHOPLASTY      REDUCTION MAMMAPLASTY      TONSILLECTOMY        General Information       Row Name 08/28/24 1621 08/28/24 1619       OT Time and Intention    Document  Type therapy note (daily note)  -RB therapy note (daily note)  -RB    Mode of Treatment individual therapy;occupational therapy  -RB individual therapy;occupational therapy  -RB      Row Name 08/28/24 1621 08/28/24 1619       General Information    Patient Profile Reviewed yes  -RB yes  -RB      Row Name 08/28/24 1619          Cognition    Orientation Status (Cognition) oriented x 4  -RB       Row Name 08/28/24 1619          Safety Issues, Functional Mobility    Safety Issues Affecting Function (Mobility) insight into deficits/self-awareness;safety precaution awareness;safety precautions follow-through/compliance;awareness of need for assistance  -RB               User Key  (r) = Recorded By, (t) = Taken By, (c) = Cosigned By      Initials Name Provider Type    Dian Manuel OT Occupational Therapist                     Mobility/ADL's       Row Name 08/28/24 1619          Bed Mobility    Comment, (Bed Mobility) UIC  -RB       Row Name 08/28/24 1619          Transfers    Comment, (Transfers) The pt declined any standing activity this afternoon.  -RB       Row Name 08/28/24 1619          Activities of Daily Living    BADL Assessment/Intervention lower body dressing;grooming;feeding  -RB       Row Name 08/28/24 1619          Lower Body Dressing Assessment/Training    [place] Level (Lower Body Dressing) lower body dressing skills;maximum assist (25% patient effort)  -RB     Position (Lower Body Dressing) supported sitting  -RB       Row Name 08/28/24 1619          Grooming Assessment/Training    [place] Level (Grooming) grooming skills;set up  -RB     Position (Grooming) supported sitting  -RB       Row Name 08/28/24 1619          Self-Feeding Assessment/Training    [place] Level (Feeding) feeding skills;set up  -RB     Position (Self-Feeding) supported sitting  -RB               User Key  (r) = Recorded By, (t) = Taken By, (c) = Cosigned By      Initials Name Provider Type    ANGELA Matta  TOMÁS Biggs Occupational Therapist                   Obj/Interventions    No documentation.                  Goals/Plan    No documentation.                  Clinical Impression    No documentation.                  Outcome Measures       Row Name 08/28/24 0900          How much help from another person do you currently need...    Turning from your back to your side while in flat bed without using bedrails? 3  -MM     Moving from lying on back to sitting on the side of a flat bed without bedrails? 3  -MM     Moving to and from a bed to a chair (including a wheelchair)? 2  -MM     Standing up from a chair using your arms (e.g., wheelchair, bedside chair)? 2  -MM     Climbing 3-5 steps with a railing? 1  -MM     To walk in hospital room? 1  -MM     AM-PAC 6 Clicks Score (PT) 12  -MM     Highest Level of Mobility Goal 4 --> Transfer to chair/commode  -MM               User Key  (r) = Recorded By, (t) = Taken By, (c) = Cosigned By      Initials Name Provider Type    MM Kelsey Jama, RN Registered Nurse                    Occupational Therapy Education       Title: PT OT SLP Therapies (In Progress)       Topic: Occupational Therapy (Done)       Point: ADL training (Done)       Description:   Instruct learner(s) on proper safety adaptation and remediation techniques during self care or transfers.   Instruct in proper use of assistive devices.                  Learning Progress Summary             Patient Acceptance, E,TB, DU,VU,NR by RB at 8/28/2024 1618    Comment: Education provided on her transfer status, technique for ADL's, her D/C planning and awareness for assistance/various options in her senior care.    Acceptance, E, VU by ESPINOZA at 8/25/2024 1435    Acceptance, E, VU by MM at 8/25/2024 1152    Comment: role of OT, d/c rec                         Point: Home exercise program (Done)       Description:   Instruct learner(s) on appropriate technique for monitoring, assisting and/or progressing therapeutic  exercises/activities.                  Learning Progress Summary             Patient Acceptance, E,TB, DU,VU,NR by RB at 8/28/2024 1618    Comment: Education provided on her transfer status, technique for ADL's, her D/C planning and awareness for assistance/various options in her [place].                         Point: Precautions (Done)       Description:   Instruct learner(s) on prescribed precautions during self-care and functional transfers.                  Learning Progress Summary             Patient Acceptance, E,TB, DU,VU,NR by RB at 8/28/2024 1618    Comment: Education provided on her transfer status, technique for ADL's, her D/C planning and awareness for assistance/various options in her [facility].    Acceptance, E, VU by ESPINOZA at 8/25/2024 1435    Acceptance, E, VU by MM at 8/25/2024 1152    Comment: role of OT, d/c rec                         Point: Body mechanics (Done)       Description:   Instruct learner(s) on proper positioning and spine alignment during self-care, functional mobility activities and/or exercises.                  Learning Progress Summary             Patient Acceptance, E,TB, DU,VU,NR by RB at 8/28/2024 1618    Comment: Education provided on her transfer status, technique for ADL's, her D/C planning and awareness for assistance/various options in her [place].    Acceptance, E, VU by ESPINOZA at 8/25/2024 1435    Acceptance, E, VU by AUGUSTUS at 8/25/2024 1152    Comment: role of OT, d/c rec                                         User Key       Initials Effective Dates Name Provider Type Discipline    RB 06/16/21 -  Dian Matta OT Occupational Therapist OT    MM 05/31/24 -  Arcelia Hoyos OT Occupational Therapist OT    ESPINOZA 01/16/24 -  Kenya Ruiz PT Physical Therapist PT                  OT Recommendation and Plan           Time Calculation:         Time Calculation- OT       Row Name 08/28/24 1618             Time Calculation- OT    OT Start Time 1300  -RB      OT Stop Time 1309  -RB      OT Time  Calculation (min) 9 min  -RB      OT Received On 08/28/24  -RB      OT - Next Appointment 08/29/24  -ANGELA                User Key  (r) = Recorded By, (t) = Taken By, (c) = Cosigned By      Initials Name Provider Type    Dian Manuel OT Occupational Therapist                  Therapy Charges for Today       Code Description Service Date Service Provider Modifiers Qty    30901931687  OT THERAPEUTIC ACT EA 15 MIN 8/28/2024 Dian Matta OT GO 1                 Dian Matta OT  8/28/2024

## 2024-08-28 NOTE — TELEPHONE ENCOUNTER
Refilled by another provider.    [Snoring] : snoring [Apneas] : apneas [de-identified] : 10 yo M referred to us by dr. Oquendo for his history of strep throat infection History of 1 strep throat infection in the past year and admitted to [facility] x 4 days  No history of throat infections since his last office evaluation in July, 2024 No parental concerns with speech development or hearing  The patient presents with a history of snoring, mouth breathing but without  GASPING and witnessed apnea at night when sleeping. This is chronic and happens at least 3 times a week.  THERE IS NO KNOWN FATIGUE. There are NO CONCERNS WITH ENURESIS.  There is no difficulty with hyperactivity/concentration.   THERE IS NO Known growth restriction. History of obesity. There is a history of ASTHMA. Asthma is managed by the pediatrcian.  No throat/tonsil infections.   No problems with ear infections, hearing, swallowing or with VPI/Speech/nasal regurgitation.  Passed NBHT AU.  Full term,  uncomplicated delivery with uncomplicated pregnancy.  No cyanosis, no ETT intubation, no home oxygen requirement, no NICU stay

## 2024-08-28 NOTE — PLAN OF CARE
Goal Outcome Evaluation:  Plan of Care Reviewed With: patient        Progress: improving  Outcome Evaluation: Pt agreed to PT session, pt suzanne STS w/MOD 2, once standing has heavy forw flexed posture, c/o old back injury causing, pt suzanne amb ~15ft w/rwx , assist of 2 for safety, educ on incr forw flex limiting balance, required one standing rest but pt motivated today and declined need for seated rest, was fatigued when back at chair , reclined and elevated LEs on pillow, heels elevated as well, plans possible back to [facility], undecided this date, but suggest HH due to falls risk , decr safety awareness      Anticipated Discharge Disposition (PT): skilled nursing facility, assisted living, home with home health

## 2024-08-28 NOTE — PROGRESS NOTES
[facility] Progress Note      Patient Name: Halie Guidry  : 1940  MRN: [id number]  Primary Care Physician:  Napoleon Mead MD  Date of admission: 2024    Subjective     Interval History:   Follow-up CKD 4.  Improved diuresis on IV Bumex.  Output 2.5 L.  Weight down 3 pounds.    Feels little better today.  Thinks legs may be a little bit less swollen.  Bowels moving.  Eating.  Review of Systems:   As noted above    Objective     Vitals:   Temp:  [96.8 °F (36 °C)-98.2 °F (36.8 °C)] 97.9 °F (36.6 °C)  Heart Rate:  [60-94] 94  Resp:  [16-18] 16  BP: (148-193)/(60-76) 178/64    Intake/Output Summary (Last 24 hours) at 2024 0824  Last data filed at 2024 0630  Gross per 24 hour   Intake 870 ml   Output 2500 ml   Net -1630 ml       Physical Exam:    General Appearance: alert, oriented x 3, no acute distress.  Chronically ill.    Skin: warm and dry  HEENT: oral mucosa normal, nonicteric sclera  Neck: supple, no JVD  Lungs: Clear to auscultation.  No wheezing.  Heart: RRR, normal S1 and S2  Abdomen: soft, nontender, nondistended. +BS  : no palpable bladder  Extremities: 1+ lower extremity edema, softer.  Both legs wrapped.  Neuro: normal speech and mental status     Scheduled Meds:     amLODIPine, 2.5 mg, Oral, Daily  atorvastatin, 80 mg, Oral, Nightly  bisoprolol, 5 mg, Oral, Daily  bumetanide, 2 mg, Intravenous, TID  ceFAZolin, 2,000 mg, Intravenous, Q12H  doxycycline, 100 mg, Oral, Q12H  DULoxetine, 30 mg, Oral, Daily  ferrous sulfate, 325 mg, Oral, Every Other Day  heparin (porcine), 5,000 Units, Subcutaneous, Q12H  hydrALAZINE, 25 mg, Oral, Q8H  hydrocortisone-bacitracin-zinc oxide-nystatin, 1 Application, Topical, BID  insulin glargine, 60 Units, Subcutaneous, Nightly  insulin lispro, 10 Units, Subcutaneous, TID AC  insulin lispro, 3-14 Units, Subcutaneous, 4x Daily AC & at Bedtime  lactobacillus acidophilus, 1 capsule, Oral, Daily  levothyroxine, 137 mcg,  Oral, Q AM  melatonin, 2.5 mg, Oral, Nightly  mupirocin, 1 Application, Topical, Daily  pantoprazole, 40 mg, Oral, Q AM  pregabalin, 50 mg, Oral, BID  sodium chloride, 3 mL, Intravenous, Q12H  spironolactone, 50 mg, Oral, Daily      IV Meds:          Results Reviewed:   I have personally reviewed the results from the time of this admission to 8/28/2024 08:24 EDT     Results from last 7 days   Lab Units 08/28/24  0648 08/27/24  0615 08/26/24  0540   SODIUM mmol/L 139 138 134*   POTASSIUM mmol/L 4.4 3.7 3.6   CHLORIDE mmol/L 102 105 100   CO2 mmol/L 22.8 22.0 21.8*   BUN mg/dL 36* 32* 24*   CREATININE mg/dL 2.25* 2.16* 2.27*   CALCIUM mg/dL 8.5* 8.4* 8.3*   BILIRUBIN mg/dL  --   --  <0.2   ALK PHOS U/L  --   --  130*   ALT (SGPT) U/L  --   --  <5   AST (SGOT) U/L  --   --  12   GLUCOSE mg/dL 82 178* 175*       Estimated Creatinine Clearance: 22.6 mL/min (A) (by C-G formula based on SCr of 2.25 mg/dL (H)).    Results from last 7 days   Lab Units 08/28/24  0648 08/27/24  0615 08/26/24  0540   MAGNESIUM mg/dL 2.0 2.2 1.6   PHOSPHORUS mg/dL 5.2* 4.8* 4.2             Results from last 7 days   Lab Units 08/28/24  0648 08/27/24  0615 08/26/24  0540 08/25/24  0539 08/24/24  1733   WBC 10*3/mm3 11.16* 8.80 9.05 12.89* 10.35   HEMOGLOBIN g/dL 10.0* 9.8* 9.5* 9.5* 10.2*   PLATELETS 10*3/mm3 362 337 314 341 337             Assessment / Plan     ASSESSMENT:  CKD 4 baseline creatinine 2.  Chronic cardiorenal syndrome.  Creatinine very stable and hyponatremia resolved.  2.  Heart failure preserved ejection fraction diuresing on IV Bumex and oral Aldactone.  Weight down today.  3.  Right lower extremity cellulitis on Vanco and Kefzol.  4.  Hypertension.  Not optimally controlled.  Starting higher dose of Aldactone this morning and will increase hydralazine.  5.  Anemia CKD.  6.  Diabetes mellitus type II.  7.  Hypothyroid inadequately replaced.  Likely adding to her heart failure decompensation.  Supplement increased  yesterday.  PLAN:  Increase hydralazine to 50 mg every 8 hours.  Continue IV Bumex 1 more day.  Thank you for involving us in the care of Halie Guidry.  Please feel free to call with any questions.    Mary Rice MD  08/28/24  08:24 EDT    [facility]  [phone]    Please note that portions of this note were completed with a voice recognition program.

## 2024-08-28 NOTE — CASE MANAGEMENT/SOCIAL WORK
Continued Stay Note  [facility]     Patient Name: Halie Guidry  MRN: [id number]  Today's Date: 8/28/2024    Admit Date: 8/24/2024    Plan: Return to [facility]   Discharge Plan       Row Name 08/28/24 1131       Plan    Plan Return to [facility]    N/A Provider List Comment Spoke with Rupali/[place] [phone] who confirmed patient is able to return at [facility]. Rupali stated patient required assistance with ADLS prior to admission and was appropriate to return functionally at this time. Patient may need transportation at [facility] ([facility] does not provide transportation). CCP to follow. Jesus BREWSTER RN                   Discharge Codes    No documentation.                 Expected Discharge Date and Time       Expected Discharge Date Expected Discharge Time    Aug 29, 2024               Jesus Dutta RN

## 2024-08-29 LAB
ALBUMIN SERPL-MCNC: 3.2 G/DL (ref 3.5–5.2)
ANION GAP SERPL CALCULATED.3IONS-SCNC: 13.3 MMOL/L (ref 5–15)
BACTERIA SPEC AEROBE CULT: NORMAL
BACTERIA SPEC AEROBE CULT: NORMAL
BUN SERPL-MCNC: 42 MG/DL (ref 8–23)
BUN/CREAT SERPL: 18.4 (ref 7–25)
CALCIUM SPEC-SCNC: 8.5 MG/DL (ref 8.6–10.5)
CHLORIDE SERPL-SCNC: 103 MMOL/L (ref 98–107)
CO2 SERPL-SCNC: 21.7 MMOL/L (ref 22–29)
CREAT SERPL-MCNC: 2.28 MG/DL (ref 0.57–1)
DEPRECATED RDW RBC AUTO: 45.7 FL (ref 37–54)
EGFRCR SERPLBLD CKD-EPI 2021: 20.7 ML/MIN/1.73
ERYTHROCYTE [DISTWIDTH] IN BLOOD BY AUTOMATED COUNT: 15 % (ref 12.3–15.4)
GLUCOSE BLDC GLUCOMTR-MCNC: 157 MG/DL (ref 70–130)
GLUCOSE BLDC GLUCOMTR-MCNC: 181 MG/DL (ref 70–130)
GLUCOSE BLDC GLUCOMTR-MCNC: 195 MG/DL (ref 70–130)
GLUCOSE BLDC GLUCOMTR-MCNC: 198 MG/DL (ref 70–130)
GLUCOSE SERPL-MCNC: 174 MG/DL (ref 65–99)
HCT VFR BLD AUTO: 31 % (ref 34–46.6)
HGB BLD-MCNC: 9.9 G/DL (ref 12–15.9)
MAGNESIUM SERPL-MCNC: 1.9 MG/DL (ref 1.6–2.4)
MCH RBC QN AUTO: 27.3 PG (ref 26.6–33)
MCHC RBC AUTO-ENTMCNC: 31.9 G/DL (ref 31.5–35.7)
MCV RBC AUTO: 85.4 FL (ref 79–97)
PHOSPHATE SERPL-MCNC: 5 MG/DL (ref 2.5–4.5)
PLATELET # BLD AUTO: 356 10*3/MM3 (ref 140–450)
PMV BLD AUTO: 9.3 FL (ref 6–12)
POTASSIUM SERPL-SCNC: 3.6 MMOL/L (ref 3.5–5.2)
RBC # BLD AUTO: 3.63 10*6/MM3 (ref 3.77–5.28)
SODIUM SERPL-SCNC: 138 MMOL/L (ref 136–145)
WBC NRBC COR # BLD AUTO: 11.24 10*3/MM3 (ref 3.4–10.8)

## 2024-08-29 PROCEDURE — 83735 ASSAY OF MAGNESIUM: CPT | Performed by: STUDENT IN AN ORGANIZED HEALTH CARE EDUCATION/TRAINING PROGRAM

## 2024-08-29 PROCEDURE — 25010000002 HEPARIN (PORCINE) PER 1000 UNITS: Performed by: INTERNAL MEDICINE

## 2024-08-29 PROCEDURE — 80069 RENAL FUNCTION PANEL: CPT | Performed by: INTERNAL MEDICINE

## 2024-08-29 PROCEDURE — 63710000001 INSULIN GLARGINE PER 5 UNITS: Performed by: INTERNAL MEDICINE

## 2024-08-29 PROCEDURE — 82948 REAGENT STRIP/BLOOD GLUCOSE: CPT

## 2024-08-29 PROCEDURE — 85027 COMPLETE CBC AUTOMATED: CPT | Performed by: STUDENT IN AN ORGANIZED HEALTH CARE EDUCATION/TRAINING PROGRAM

## 2024-08-29 PROCEDURE — 63710000001 INSULIN LISPRO (HUMAN) PER 5 UNITS: Performed by: INTERNAL MEDICINE

## 2024-08-29 PROCEDURE — 97530 THERAPEUTIC ACTIVITIES: CPT

## 2024-08-29 RX ADMIN — ATORVASTATIN CALCIUM 80 MG: 80 TABLET, FILM COATED ORAL at 20:05

## 2024-08-29 RX ADMIN — Medication 1 APPLICATION: at 20:11

## 2024-08-29 RX ADMIN — DOXYCYCLINE 100 MG: 100 CAPSULE ORAL at 09:43

## 2024-08-29 RX ADMIN — INSULIN LISPRO 10 UNITS: 100 INJECTION, SOLUTION INTRAVENOUS; SUBCUTANEOUS at 18:44

## 2024-08-29 RX ADMIN — INSULIN LISPRO 10 UNITS: 100 INJECTION, SOLUTION INTRAVENOUS; SUBCUTANEOUS at 07:08

## 2024-08-29 RX ADMIN — DOXYCYCLINE 100 MG: 100 CAPSULE ORAL at 20:05

## 2024-08-29 RX ADMIN — HYDROCODONE BITARTRATE AND ACETAMINOPHEN 1 TABLET: 5; 325 TABLET ORAL at 16:11

## 2024-08-29 RX ADMIN — Medication 1 CAPSULE: at 09:43

## 2024-08-29 RX ADMIN — MUPIROCIN 1 APPLICATION: 20 OINTMENT TOPICAL at 09:48

## 2024-08-29 RX ADMIN — Medication 3 ML: at 09:00

## 2024-08-29 RX ADMIN — Medication 1 APPLICATION: at 09:48

## 2024-08-29 RX ADMIN — LEVOTHYROXINE SODIUM 137 MCG: 137 TABLET ORAL at 07:00

## 2024-08-29 RX ADMIN — DULOXETINE HYDROCHLORIDE 30 MG: 30 CAPSULE, DELAYED RELEASE ORAL at 09:43

## 2024-08-29 RX ADMIN — HYDROCODONE BITARTRATE AND ACETAMINOPHEN 1 TABLET: 5; 325 TABLET ORAL at 09:43

## 2024-08-29 RX ADMIN — PREGABALIN 50 MG: 50 CAPSULE ORAL at 09:43

## 2024-08-29 RX ADMIN — HYDRALAZINE HYDROCHLORIDE 75 MG: 50 TABLET ORAL at 21:45

## 2024-08-29 RX ADMIN — Medication 2.5 MG: at 20:10

## 2024-08-29 RX ADMIN — AMLODIPINE BESYLATE 2.5 MG: 2.5 TABLET ORAL at 09:43

## 2024-08-29 RX ADMIN — INSULIN LISPRO 10 UNITS: 100 INJECTION, SOLUTION INTRAVENOUS; SUBCUTANEOUS at 13:03

## 2024-08-29 RX ADMIN — PREGABALIN 50 MG: 50 CAPSULE ORAL at 20:11

## 2024-08-29 RX ADMIN — INSULIN LISPRO 3 UNITS: 100 INJECTION, SOLUTION INTRAVENOUS; SUBCUTANEOUS at 07:01

## 2024-08-29 RX ADMIN — SPIRONOLACTONE 50 MG: 50 TABLET ORAL at 09:43

## 2024-08-29 RX ADMIN — FERROUS SULFATE TAB 325 MG (65 MG ELEMENTAL FE) 325 MG: 325 (65 FE) TAB at 09:43

## 2024-08-29 RX ADMIN — INSULIN LISPRO 3 UNITS: 100 INJECTION, SOLUTION INTRAVENOUS; SUBCUTANEOUS at 21:42

## 2024-08-29 RX ADMIN — HEPARIN SODIUM 5000 UNITS: 5000 INJECTION INTRAVENOUS; SUBCUTANEOUS at 20:06

## 2024-08-29 RX ADMIN — HEPARIN SODIUM 5000 UNITS: 5000 INJECTION INTRAVENOUS; SUBCUTANEOUS at 09:43

## 2024-08-29 RX ADMIN — INSULIN LISPRO 3 UNITS: 100 INJECTION, SOLUTION INTRAVENOUS; SUBCUTANEOUS at 18:45

## 2024-08-29 RX ADMIN — INSULIN LISPRO 3 UNITS: 100 INJECTION, SOLUTION INTRAVENOUS; SUBCUTANEOUS at 13:02

## 2024-08-29 RX ADMIN — BUMETANIDE 3 MG: 2 TABLET ORAL at 12:58

## 2024-08-29 RX ADMIN — PANTOPRAZOLE SODIUM 40 MG: 40 TABLET, DELAYED RELEASE ORAL at 07:00

## 2024-08-29 RX ADMIN — HYDRALAZINE HYDROCHLORIDE 50 MG: 50 TABLET ORAL at 07:00

## 2024-08-29 RX ADMIN — Medication 3 ML: at 20:11

## 2024-08-29 RX ADMIN — BUMETANIDE 3 MG: 2 TABLET ORAL at 18:44

## 2024-08-29 RX ADMIN — INSULIN GLARGINE 60 UNITS: 100 INJECTION, SOLUTION SUBCUTANEOUS at 21:42

## 2024-08-29 RX ADMIN — BISOPROLOL FUMARATE 5 MG: 5 TABLET, FILM COATED ORAL at 09:43

## 2024-08-29 RX ADMIN — HYDRALAZINE HYDROCHLORIDE 75 MG: 50 TABLET ORAL at 16:11

## 2024-08-29 NOTE — THERAPY TREATMENT NOTE
Patient Name: Halie Guidry  : 1940    MRN: [id number]                              Today's Date: 2024       Admit Date: 2024    Visit Dx:     ICD-10-CM ICD-9-CM   1. Cellulitis of lower extremity, unspecified laterality  L03.119 682.6   2. Bilateral lower extremity edema  R60.0 782.3   3. Chronic kidney disease, unspecified CKD stage  N18.9 585.9   4. History of diabetes mellitus  Z86.39 V12.29     Patient Active Problem List   Diagnosis    Compression fracture    Lumbar degenerative disc disease    Type 2 diabetes mellitus with hyperglycemia, with long-term current use of insulin    Hyperlipidemia    Benign essential hypertension    Primary hypothyroidism    Neuropathy    Osteoporosis    Proteinuria    Tobacco abuse    Generalized weakness    Spinal stenosis    Scoliosis    Arthritis    VBI (vertebrobasilar insufficiency)    Orthostatic hypotension    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    Severe hypothyroidism    Noncompliance with medication regimen    Vitamin D deficiency disease    Tremor    Seizure    Thoracic degenerative disc disease    Hyperglycemia    Type II diabetes mellitus, uncontrolled    Lower abdominal pain    Transaminitis    Emphysematous cystitis    Choledocholithiasis    Hypokalemia    Hypoxia    Generalized abdominal pain    History of Clostridium difficile infection    History of ERCP    Hypomagnesemia    Anxiety disorder    Neuropathic pain    Intertrigo    Weakness of both lower extremities    Accelerated hypertension    Acute cystitis without hematuria    Left lower lobe pneumonia    Cellulitis and abscess of left lower extremity    Benign hypertension with CKD (chronic kidney disease) stage IV    Peripheral edema    Primary malignant neuroendocrine tumor of pancreas    Encounter for long-term (current) use of high-risk medication    Diabetic foot ulcer    Stage 3a chronic kidney disease    Pressure injury of buttock, stage 2    HTN  (hypertension)    Anemia due to chronic kidney disease    Bilateral lower extremity edema    Cellulitis of right lower extremity    CKD (chronic kidney disease) stage 4, GFR 15-29 ml/min    Acute CHF    Bilateral cellulitis of lower leg     Past Medical History:   Diagnosis Date    Acute metabolic encephalopathy 06/24/2022    Anxiety     Arthritis     Benign essential hypertension 08/20/2014    Bleeding disorder     Depression     Diabetes     Diabetes mellitus, type 2     Disc degeneration, lumbar     Headache, tension-type     Hyperlipidemia     Hypothyroidism     Neuropathy     Osteoporosis 09/09/2015    Pancreatic mass     Sept 2023, on Monthly Octreotide Injection    Peripheral neuropathy     Rotator cuff tear, left     Scoliosis     Shoulder pain     LEFT, TORN ROTATOR CUFF S/P FALL    Spinal stenosis      Past Surgical History:   Procedure Laterality Date    APPENDECTOMY      BILATERAL BREAST REDUCTION Bilateral 08/2015    CATARACT EXTRACTION  03/2015    CHOLECYSTECTOMY WITH INTRAOPERATIVE CHOLANGIOGRAM N/A 3/27/2022    Procedure: CHOLECYSTECTOMY LAPAROSCOPIC INTRAOPERATIVE CHOLANGIOGRAM;  Surgeon: Aiyana Hill MD;  Location: [facility] MAIN OR;  Service: General;  Laterality: N/A;    COLONOSCOPY  06/05/2015    WNL    ERCP N/A 2/25/2022    Procedure: ENDOSCOPIC RETROGRADE CHOLANGIOPANCREATOGRAPHY with sphincterotomy and balloon sweep;  Surgeon: Chilo Wilhelm MD;  Location: [facility] ENDOSCOPY;  Service: Gastroenterology;  Laterality: N/A;  PRE/POST - CBD stones    ERCP N/A 3/28/2022    Procedure: ENDOSCOPIC RETROGRADE CHOLANGIOPANCREATOGRAPHY WITH SPHINCTEROTOMY AND BALLOON SWEEP;  Surgeon: Chilo Wilhelm MD;  Location: [facility] ENDOSCOPY;  Service: Gastroenterology;  Laterality: N/A;  PRE: COMMON DUCT STONE  POST: COMMON DUCT STONE    KYPHOPLASTY      REDUCTION MAMMAPLASTY      TONSILLECTOMY        General Information       Row Name 08/29/24 9304          Physical Therapy Time and Intention     Document Type therapy note (daily note)  -CB     Mode of Treatment individual therapy;physical therapy  -CB       Row Name 08/29/24 1437          General Information    Existing Precautions/Restrictions fall  -CB       Row Name 08/29/24 1437          Cognition    Orientation Status (Cognition) oriented x 4  -CB       Row Name 08/29/24 1437          Safety Issues, Functional Mobility    Impairments Affecting Function (Mobility) balance;endurance/activity tolerance;sensation/sensory awareness;pain;strength  -CB               User Key  (r) = Recorded By, (t) = Taken By, (c) = Cosigned By      Initials Name Provider Type    CB Manisha uS PT Physical Therapist                   Mobility       Row Name 08/29/24 1437          Bed Mobility    Bed Mobility supine-sit  -CB     Supine-Sit [place] (Bed Mobility) standby assist;verbal cues  -CB     Assistive Device (Bed Mobility) bed rails;head of bed elevated  -CB       Row Name 08/29/24 1437          Sit-Stand Transfer    Sit-Stand [place] (Transfers) minimum assist (75% patient effort);2 person assist;verbal cues  -CB     Assistive Device (Sit-Stand Transfers) walker, front-wheeled  -CB       Row Name 08/29/24 1437          Gait/Stairs (Locomotion)    [place] Level (Gait) 2 person assist;minimum assist (75% patient effort);verbal cues;nonverbal cues (demo/gesture)  -CB     Assistive Device (Gait) walker, front-wheeled  -CB     Distance in Feet (Gait) 15  -CB     Deviations/Abnormal Patterns (Gait) tess decreased;antalgic;stride length decreased  -CB     Bilateral Gait Deviations forward flexed posture;weight shift ability decreased  -CB     Comment, (Gait/Stairs) no overt LOB noted; VC to maintain safe distance inside rwx  -CB               User Key  (r) = Recorded By, (t) = Taken By, (c) = Cosigned By      Initials Name Provider Type    Manisha Bell PT Physical Therapist                   Obj/Interventions       Row Name 08/29/24 1430           Motor Skills    Therapeutic Exercise --  AP, LAQ, marching x10 reps  -CB       Row Name 08/29/24 1438          Balance    Balance Assessment standing static balance;standing dynamic balance  -CB     Static Standing Balance minimal assist;2-person assist;verbal cues  -CB     Dynamic Standing Balance minimal assist;2-person assist;verbal cues  -CB     Position/Device Used, Standing Balance supported;walker, front-wheeled  -CB     Balance Interventions sitting;standing;sit to stand;supported;static;dynamic;minimal challenge  -CB               User Key  (r) = Recorded By, (t) = Taken By, (c) = Cosigned By      Initials Name Provider Type    Manisha Bell, PT Physical Therapist                   Goals/Plan    No documentation.                  Clinical Impression       Row Name 08/29/24 1438          Pain    Pretreatment Pain Rating --  15  -CB       Row Name 08/29/24 1438          Plan of Care Review    Plan of Care Reviewed With patient  -CB     Progress improving  -CB     Outcome Evaluation Patient is agreeable to PT this afternoon. She states her pain is 15/10 in B legs but agreeable to therapy. she completed STS to rwx requiring minAx2 and ambulated 15ft using rwx requiring minAx2. VC for upright posture and to maintain safe distance to rwx. Pt seated in chair and completed ther ex to improve activity tolerance. Will continue to progress as pt tolerates.  -CB       Row Name 08/29/24 1438          Positioning and Restraints    Pre-Treatment Position in bed  -CB     Post Treatment Position chair  -CB     In Chair notified nsg;reclined;call light within reach;encouraged to call for assist;exit alarm on  -CB               User Key  (r) = Recorded By, (t) = Taken By, (c) = Cosigned By      Initials Name Provider Type    Manisha Bell, PT Physical Therapist                   Outcome Measures       Row Name 08/29/24 1440 08/29/24 0900       How much help from another person do you currently need...    Turning from  your back to your side while in flat bed without using bedrails? 3  -CB 3  -MM    Moving from lying on back to sitting on the side of a flat bed without bedrails? 3  -CB 2  -MM    Moving to and from a bed to a chair (including a wheelchair)? 3  -CB 2  -MM    Standing up from a chair using your arms (e.g., wheelchair, bedside chair)? 3  -CB 2  -MM    Climbing 3-5 steps with a railing? 1  -CB 1  -MM    To walk in hospital room? 3  -CB 2  -MM    AM-PAC 6 Clicks Score (PT) 16  -CB 12  -MM    Highest Level of Mobility Goal 5 --> Static standing  -CB 4 --> Transfer to chair/commode  -MM      Row Name 08/29/24 1440          Functional Assessment    Outcome Measure Options AM-PAC 6 Clicks Basic Mobility (PT)  -CB               User Key  (r) = Recorded By, (t) = Taken By, (c) = Cosigned By      Initials Name Provider Type    MM Kelsey Jama RN Registered Nurse    Manisha Bell, PT Physical Therapist                                 Physical Therapy Education       Title: PT OT SLP Therapies (Done)       Topic: Physical Therapy (Done)       Point: Mobility training (Done)       Learning Progress Summary             Patient Acceptance, E,TB, VU,NR by SUSAN at 8/29/2024 1440    Acceptance, E, VU,NR by EMILY at 8/29/2024 0428    Acceptance, E,TB,D, VU,NR by JOSE ALBERTO at 8/28/2024 1631    Acceptance, E, VU by ESPINOZA at 8/25/2024 1435                         Point: Home exercise program (Done)       Learning Progress Summary             Patient Acceptance, E,TB, VU,NR by SUSAN at 8/29/2024 1440    Acceptance, E, VU,NR by EMILY at 8/29/2024 0428    Acceptance, E,TB,D, VU,NR by JOSE ALBERTO at 8/28/2024 1631                         Point: Body mechanics (Done)       Learning Progress Summary             Patient Acceptance, E,TB, VU,NR by SUSAN at 8/29/2024 1440    Acceptance, E, VU,NR by EMILY at 8/29/2024 0428    Acceptance, E,TB,D, VU,NR by JOSE ALBERTO at 8/28/2024 1631    Acceptance, E, VU by ESPINOZA at 8/25/2024 1435                         Point: Precautions (Done)        Learning Progress Summary             Patient Acceptance, E,TB, VU,NR by CB at 8/29/2024 1440    Acceptance, E, VU,NR by  at 8/29/2024 0428    Acceptance, E,TB,D, VU,NR by JOSE ALBERTO at 8/28/2024 1631    Acceptance, E, VU by  at 8/25/2024 1435                                         User Key       Initials Effective Dates Name Provider Type Discipline    JOSE ALBERTO 03/07/18 -  Beena Ray, PTA Physical Therapist Assistant PT    CB 10/22/21 -  Manisha Su, PT Physical Therapist PT    JL 01/16/24 -  Kenya Ruiz, PT Physical Therapist PT     06/25/24 -  Josephine Puckett, RN Registered Nurse Nurse                  PT Recommendation and Plan     Plan of Care Reviewed With: patient  Progress: improving  Outcome Evaluation: Patient is agreeable to PT this afternoon. She states her pain is 15/10 in B legs but agreeable to therapy. she completed STS to rwx requiring minAx2 and ambulated 15ft using rwx requiring minAx2. VC for upright posture and to maintain safe distance to rwx. Pt seated in chair and completed ther ex to improve activity tolerance. Will continue to progress as pt tolerates.     Time Calculation:         PT Charges       Row Name 08/29/24 1440             Time Calculation    Start Time 1400  -CB      Stop Time 1410  -CB      Time Calculation (min) 10 min  -CB      PT Received On 08/29/24  -CB      PT - Next Appointment 08/30/24  -CB         Time Calculation- PT    Total Timed Code Minutes- PT 10 minute(s)  -CB         Timed Charges    84445 - PT Therapeutic Activity Minutes 10  -CB         Total Minutes    Timed Charges Total Minutes 10  -CB       Total Minutes 10  -CB                User Key  (r) = Recorded By, (t) = Taken By, (c) = Cosigned By      Initials Name Provider Type    CB Mnaisha Su, PT Physical Therapist                  Therapy Charges for Today       Code Description Service Date Service Provider Modifiers Qty    [id number] HC PT THERAPEUTIC ACT EA 15 MIN 8/29/2024 aMnisha Su,  PT GP 1            PT G-Codes  Outcome Measure Options: AM-PAC 6 Clicks Basic Mobility (PT)  AM-PAC 6 Clicks Score (PT): 16  AM-PAC 6 Clicks Score (OT): 16       Manisha Su, MELODIE  8/29/2024

## 2024-08-29 NOTE — PROGRESS NOTES
[facility]     LOS: 3 days     Subjective:    CC:Leg Swelling    DIET:  Diet Order   Procedures    Diet: Diabetic, Cardiac; Healthy Heart (2-3 Na+); Consistent Carbohydrate; Fluid Consistency: Thin (IDDSI 0)       Objective:    Vital Signs:  Temp:  [97.4 °F (36.3 °C)-98.2 °F (36.8 °C)] 97.5 °F (36.4 °C)  Heart Rate:  [56-94] 63  Resp:  [16-18] 18  BP: (134-178)/(61-76) 134/61    SpO2:  [96 %-97 %] 97 %  on   ;   Device (Oxygen Therapy): room air  Body mass index is 32.25 kg/m².    Physical Exam  HENT:      Head: Normocephalic and atraumatic.   Cardiovascular:      Heart sounds: No murmur heard.     No friction rub.   Pulmonary:      Effort: Pulmonary effort is normal.      Breath sounds: Normal breath sounds.   Abdominal:      General: Bowel sounds are normal. There is no distension.      Palpations: Abdomen is soft.      Tenderness: There is no abdominal tenderness.   Musculoskeletal:      Comments: Both legs wrapped, right leg dressing taken down and left loosened enough to look at skin, cellulitis is almost completely resolved   Skin:     General: Skin is warm and dry.         Results Review:    Glucose   Date Value Ref Range Status   08/28/2024 82 65 - 99 mg/dL Final   08/27/2024 178 (H) 65 - 99 mg/dL Final   08/26/2024 175 (H) 65 - 99 mg/dL Final     Results from last 7 days   Lab Units 08/28/24  0648   WBC 10*3/mm3 11.16*   HEMOGLOBIN g/dL 10.0*   HEMATOCRIT % 31.4*   PLATELETS 10*3/mm3 362     Results from last 7 days   Lab Units 08/28/24  0648 08/27/24  0615 08/26/24  0540   SODIUM mmol/L 139   < > 134*   POTASSIUM mmol/L 4.4   < > 3.6   CHLORIDE mmol/L 102   < > 100   CO2 mmol/L 22.8   < > 21.8*   BUN mg/dL 36*   < > 24*   CREATININE mg/dL 2.25*   < > 2.27*   CALCIUM mg/dL 8.5*   < > 8.3*   BILIRUBIN mg/dL  --   --  <0.2   ALK PHOS U/L  --   --  130*   ALT (SGPT) U/L  --   --  <5   AST (SGOT) U/L  --   --  12   GLUCOSE mg/dL 82   < > 175*    < > = values in this interval not  displayed.         Results from last 7 days   Lab Units 08/28/24  0648   MAGNESIUM mg/dL 2.0         Cultures:  Blood Culture   Date Value Ref Range Status   08/24/2024 No growth at 3 days  Preliminary   08/24/2024 No growth at 4 days  Preliminary       I have reviewed daily medications and changes in CPOE    Scheduled meds  amLODIPine, 2.5 mg, Oral, Daily  atorvastatin, 80 mg, Oral, Nightly  bisoprolol, 5 mg, Oral, Daily  bumetanide, 2 mg, Intravenous, TID  doxycycline, 100 mg, Oral, Q12H  DULoxetine, 30 mg, Oral, Daily  ferrous sulfate, 325 mg, Oral, Every Other Day  heparin (porcine), 5,000 Units, Subcutaneous, Q12H  hydrALAZINE, 50 mg, Oral, Q8H  hydrocortisone-bacitracin-zinc oxide-nystatin, 1 Application, Topical, BID  insulin glargine, 60 Units, Subcutaneous, Nightly  insulin lispro, 10 Units, Subcutaneous, TID AC  insulin lispro, 3-14 Units, Subcutaneous, 4x Daily AC & at Bedtime  lactobacillus acidophilus, 1 capsule, Oral, Daily  levothyroxine, 137 mcg, Oral, Q AM  melatonin, 2.5 mg, Oral, Nightly  mupirocin, 1 Application, Topical, Daily  pantoprazole, 40 mg, Oral, Q AM  pregabalin, 50 mg, Oral, BID  sodium chloride, 3 mL, Intravenous, Q12H  spironolactone, 50 mg, Oral, Daily           PRN meds    acetaminophen **OR** acetaminophen **OR** acetaminophen    benzocaine    senna-docusate sodium **AND** polyethylene glycol **AND** bisacodyl **AND** bisacodyl    dextrose    dextrose    famotidine    glucagon (human recombinant)    hydrALAZINE    HYDROcodone-acetaminophen    labetalol    ondansetron ODT **OR** ondansetron    sodium chloride    sodium chloride        Cellulitis of right lower extremity    Type 2 diabetes mellitus with hyperglycemia, with long-term current use of insulin    Hyperlipidemia    Benign essential hypertension    Neuropathy    Generalized weakness    Accelerated hypertension    Peripheral edema    Primary malignant neuroendocrine tumor of pancreas    Anemia due to chronic kidney  disease    Bilateral lower extremity edema    CKD (chronic kidney disease) stage 4, GFR 15-29 ml/min    Acute CHF    Bilateral cellulitis of lower leg        Assessment/Plan:      84-year-old female presents the hospital with concern for leg cellulitis right greater than left with multiple leg wounds.     Cellulitis of the leg right greater than left with bilateral leg wounds:  -Wound drainage has a yellow appearance concerning for possible impetigo.   -On IV vancomycin, cefazolin.  Will plan for 7-day course of antibiotics.  Will continue IV cefazolin, transitioned to p.o. doxycycline   -Wound care  -Swelling/erythema improving     Acute CHF, anasarca:  -Echocardiogram 08/26/2024 EF of 61.8%,  -On IV Bumex nephrology managing until tomorrow     CKD 4:  -Previous records reviewed and baseline creatinine 1.7-2.0.    -Protein to creatinine ratio 7.974, nephrotic range, likely secondary to diabetes  -Creatinine stable, nephrology managing     Anemia  -Chart review iron panel last year was consistent with iron deficient anemia  -Repeat iron panel 08/26/2024 mixed, consistent with anemia of chronic disease and iron deficiency anemia  -Initiated on p.o. iron every other day  -Hemoglobin stable     Type 2 diabetes:   -A1c 7.9.  Monitor blood sugar and adjust insulin as needed.    -Blood glucose stable on current regimen  -Continue Lantus 70 units nightly, scheduled lispro 10 units 3 times daily with meals, SSI  -Blood sugars good      Essential hypertension:   BP improving, nephrology adjusting meds.     Neuropathy:   Decrease Lyrica dose due to CKD.  Continue low-dose chronic hydrocodone.     Neuroendocrine tumor:   Reportedly receives management currently with oncology.     Hyperlipidemia: Continue statin.  Stable.     Hypokalemia  -WNL     Hypothyroidism  -TSH elevated at 21, free T4 and free T3 low 08/27/2024  -Increase levothyroxine to 137 mcg daily from 112 mcg daily  -Will need repeat labs in 4 weeks     Maybe d/c  in 1-2 days    Nolan Dasilva MD  08/28/24  20:50 EDT

## 2024-08-29 NOTE — PLAN OF CARE
Goal Outcome Evaluation:  Plan of Care Reviewed With: patient        Progress: improving  Outcome Evaluation: Pt is A&Ox4, VSS. Norco given for pain. No complaints of N/V/D. KVO running in PIV. Pt needed 5 units SSI, also recieved glargine. Heparin given. Magic barrir cream applied to skin folds (abdominal, breast, sides). Heels elevated. Bandages wrapped around BLEs- not able to visualize. Dressing changed during day shift. Fall precautions maintained. Assist x2 up to BSC. POC ongoing.       Pt is ACHS and PW on all night, adequate output.

## 2024-08-29 NOTE — PLAN OF CARE
Goal Outcome Evaluation:  Plan of Care Reviewed With: patient        Progress: improving  Outcome Evaluation: Patient is agreeable to PT this afternoon. She states her pain is 15/10 in B legs but agreeable to therapy. she completed STS to rwx requiring minAx2 and ambulated 15ft using rwx requiring minAx2. VC for upright posture and to maintain safe distance to rwx. Pt seated in chair and completed ther ex. Will continue to progress as pt tolerates.

## 2024-08-29 NOTE — PROGRESS NOTES
[facility]     LOS: 4 days     Subjective:    CC:Leg Swelling    DIET:  Diet Order   Procedures    Diet: Diabetic, Cardiac; Healthy Heart (2-3 Na+); Consistent Carbohydrate; Fluid Consistency: Thin (IDDSI 0)       Objective:    Vital Signs:  Temp:  [96.7 °F (35.9 °C)-97.5 °F (36.4 °C)] 96.7 °F (35.9 °C)  Heart Rate:  [57-68] 57  Resp:  [18] 18  BP: (134-175)/(59-68) 167/64    SpO2:  [91 %-97 %] 97 %  on   ;   Device (Oxygen Therapy): room air  Body mass index is 33.05 kg/m².    Physical Exam  HENT:      Head: Normocephalic and atraumatic.   Cardiovascular:      Heart sounds: No murmur heard.     No friction rub.   Pulmonary:      Effort: Pulmonary effort is normal.      Breath sounds: Normal breath sounds.   Abdominal:      General: Bowel sounds are normal. There is no distension.      Palpations: Abdomen is soft.      Tenderness: There is no abdominal tenderness.   Musculoskeletal:      Comments: Both legs wrapped and no erythema extending beyond the dressing.  Legs are very sensitive to touch   Skin:     General: Skin is warm and dry.         Results Review:    Glucose   Date Value Ref Range Status   08/29/2024 174 (H) 65 - 99 mg/dL Final   08/28/2024 82 65 - 99 mg/dL Final   08/27/2024 178 (H) 65 - 99 mg/dL Final     Results from last 7 days   Lab Units 08/29/24  0756   WBC 10*3/mm3 11.24*   HEMOGLOBIN g/dL 9.9*   HEMATOCRIT % 31.0*   PLATELETS 10*3/mm3 356     Results from last 7 days   Lab Units 08/29/24  0756 08/27/24  0615 08/26/24  0540   SODIUM mmol/L 138   < > 134*   POTASSIUM mmol/L 3.6   < > 3.6   CHLORIDE mmol/L 103   < > 100   CO2 mmol/L 21.7*   < > 21.8*   BUN mg/dL 42*   < > 24*   CREATININE mg/dL 2.28*   < > 2.27*   CALCIUM mg/dL 8.5*   < > 8.3*   BILIRUBIN mg/dL  --   --  <0.2   ALK PHOS U/L  --   --  130*   ALT (SGPT) U/L  --   --  <5   AST (SGOT) U/L  --   --  12   GLUCOSE mg/dL 174*   < > 175*    < > = values in this interval not displayed.         Results from last  7 days   Lab Units 08/29/24  0504   MAGNESIUM mg/dL 1.9         Cultures:  Blood Culture   Date Value Ref Range Status   08/24/2024 No growth at 3 days  Preliminary   08/24/2024 No growth at 4 days  Preliminary       I have reviewed daily medications and changes in CPOE    Scheduled meds  amLODIPine, 2.5 mg, Oral, Daily  atorvastatin, 80 mg, Oral, Nightly  bisoprolol, 5 mg, Oral, Daily  bumetanide, 3 mg, Oral, BID  doxycycline, 100 mg, Oral, Q12H  DULoxetine, 30 mg, Oral, Daily  ferrous sulfate, 325 mg, Oral, Every Other Day  heparin (porcine), 5,000 Units, Subcutaneous, Q12H  hydrALAZINE, 75 mg, Oral, Q8H  hydrocortisone-bacitracin-zinc oxide-nystatin, 1 Application, Topical, BID  insulin glargine, 60 Units, Subcutaneous, Nightly  insulin lispro, 10 Units, Subcutaneous, TID AC  insulin lispro, 3-14 Units, Subcutaneous, 4x Daily AC & at Bedtime  lactobacillus acidophilus, 1 capsule, Oral, Daily  levothyroxine, 137 mcg, Oral, Q AM  melatonin, 2.5 mg, Oral, Nightly  mupirocin, 1 Application, Topical, Daily  pantoprazole, 40 mg, Oral, Q AM  pregabalin, 50 mg, Oral, BID  sodium chloride, 3 mL, Intravenous, Q12H  spironolactone, 50 mg, Oral, Daily           PRN meds    acetaminophen **OR** acetaminophen **OR** acetaminophen    benzocaine    senna-docusate sodium **AND** polyethylene glycol **AND** bisacodyl **AND** bisacodyl    dextrose    dextrose    famotidine    glucagon (human recombinant)    hydrALAZINE    HYDROcodone-acetaminophen    labetalol    ondansetron ODT **OR** ondansetron    sodium chloride    sodium chloride        Cellulitis of right lower extremity    Type 2 diabetes mellitus with hyperglycemia, with long-term current use of insulin    Hyperlipidemia    Benign essential hypertension    Neuropathy    Generalized weakness    Accelerated hypertension    Peripheral edema    Primary malignant neuroendocrine tumor of pancreas    Anemia due to chronic kidney disease    Bilateral lower extremity edema    CKD  (chronic kidney disease) stage 4, GFR 15-29 ml/min    Acute CHF    Bilateral cellulitis of lower leg        Assessment/Plan:      84-year-old female presents the hospital with concern for leg cellulitis right greater than left with multiple leg wounds.     Cellulitis of the leg right greater than left with bilateral leg wounds:  -Initially patient had some wound drainage concerning for impetigo no no more drainage noted  -Initially given vancomycin and then cefazolin.  And now on doxycycline.  -Wound care  -Swelling/erythema improving     Acute CHF, anasarca:  -Echocardiogram 08/26/2024 EF of 61.8%,  -Patient changed from IV diuretics to oral diuretics     CKD 4:  -Creatinine appears to be slightly above baseline  -Protein to creatinine ratio 7.974, nephrotic range, likely secondary to diabetes  -Creatinine stable, nephrology managing     Anemia  -Chart review iron panel last year was consistent with iron deficient anemia  -Repeat iron panel 08/26/2024 mixed, consistent with anemia of chronic disease and iron deficiency anemia  -Initiated on p.o. iron every other day  -Hemoglobin stable     Type 2 diabetes:   -A1c 7.9.  Monitor blood sugar and adjust insulin as needed.    -Blood glucose stable on current regimen  -Continue Lantus 70 units nightly, scheduled lispro 10 units 3 times daily with meals, SSI  -Blood sugars reasonably controlled     Essential hypertension:   BP improving, blood pressures elevated slightly, nephrology adjusting medication     Neuropathy:   Decrease Lyrica dose due to CKD.  Continue low-dose chronic hydrocodone.     Neuroendocrine tumor:   management currently with oncology.     Hyperlipidemia: Continue statin.  Stable.     Hypokalemia  -WNL     Hypothyroidism  -TSH elevated at 21, free T4 and free T3 low 08/27/2024  -Increase levothyroxine to 137 mcg daily from 112 mcg daily  -Will need repeat labs in 4 weeks     Maybe d/c in 1-2 days    Nolan Dasilva MD  08/29/24  16:06 EDT

## 2024-08-29 NOTE — PROGRESS NOTES
[facility] Progress Note      Patient Name: Halie Guidry  : 1940  MRN: [id number]  Primary Care Physician:  Napoleon Mead MD  Date of admission: 2024    Subjective     Interval History:   Follow-up CKD 4.  Urine output 1900.  Weight not consistent.  Blood pressure not optimally controlled.  Legs  but edema better. Eating .  Review of Systems:   As noted above    Objective     Vitals:   Temp:  [96.9 °F (36.1 °C)-97.5 °F (36.4 °C)] 96.9 °F (36.1 °C)  Heart Rate:  [56-68] 64  Resp:  [18] 18  BP: (134-164)/(59-68) 164/59    Intake/Output Summary (Last 24 hours) at 2024 0821  Last data filed at 2024 0519  Gross per 24 hour   Intake 480 ml   Output 1900 ml   Net -1420 ml       Physical Exam:    General Appearance: alert, oriented x 3, no acute distress.  Chronically ill.    Skin: warm and dry  HEENT: oral mucosa normal, nonicteric sclera  Neck: supple, no JVD  Lungs: Clear to auscultation.  No wheezing.  Heart: RRR, normal S1 and S2  Abdomen: soft, nontender, nondistended. +BS  : no palpable bladder  Extremities: 1+ lower extremity edema, softer.  Both legs wrapped.  Neuro: normal speech and mental status     Scheduled Meds:     amLODIPine, 2.5 mg, Oral, Daily  atorvastatin, 80 mg, Oral, Nightly  bisoprolol, 5 mg, Oral, Daily  bumetanide, 2 mg, Intravenous, TID  doxycycline, 100 mg, Oral, Q12H  DULoxetine, 30 mg, Oral, Daily  ferrous sulfate, 325 mg, Oral, Every Other Day  heparin (porcine), 5,000 Units, Subcutaneous, Q12H  hydrALAZINE, 75 mg, Oral, Q8H  hydrocortisone-bacitracin-zinc oxide-nystatin, 1 Application, Topical, BID  insulin glargine, 60 Units, Subcutaneous, Nightly  insulin lispro, 10 Units, Subcutaneous, TID AC  insulin lispro, 3-14 Units, Subcutaneous, 4x Daily AC & at Bedtime  lactobacillus acidophilus, 1 capsule, Oral, Daily  levothyroxine, 137 mcg, Oral, Q AM  melatonin, 2.5 mg, Oral, Nightly  mupirocin, 1 Application, Topical,  Daily  pantoprazole, 40 mg, Oral, Q AM  pregabalin, 50 mg, Oral, BID  sodium chloride, 3 mL, Intravenous, Q12H  spironolactone, 50 mg, Oral, Daily      IV Meds:          Results Reviewed:   I have personally reviewed the results from the time of this admission to 8/29/2024 08:21 EDT     Results from last 7 days   Lab Units 08/28/24  0648 08/27/24  0615 08/26/24  0540   SODIUM mmol/L 139 138 134*   POTASSIUM mmol/L 4.4 3.7 3.6   CHLORIDE mmol/L 102 105 100   CO2 mmol/L 22.8 22.0 21.8*   BUN mg/dL 36* 32* 24*   CREATININE mg/dL 2.25* 2.16* 2.27*   CALCIUM mg/dL 8.5* 8.4* 8.3*   BILIRUBIN mg/dL  --   --  <0.2   ALK PHOS U/L  --   --  130*   ALT (SGPT) U/L  --   --  <5   AST (SGOT) U/L  --   --  12   GLUCOSE mg/dL 82 178* 175*       Estimated Creatinine Clearance: 22.9 mL/min (A) (by C-G formula based on SCr of 2.25 mg/dL (H)).    Results from last 7 days   Lab Units 08/29/24  0504 08/28/24  0648 08/27/24  0615 08/26/24  0540   MAGNESIUM mg/dL 1.9 2.0 2.2 1.6   PHOSPHORUS mg/dL  --  5.2* 4.8* 4.2             Results from last 7 days   Lab Units 08/28/24  0648 08/27/24  0615 08/26/24  0540 08/25/24  0539 08/24/24  1733   WBC 10*3/mm3 11.16* 8.80 9.05 12.89* 10.35   HEMOGLOBIN g/dL 10.0* 9.8* 9.5* 9.5* 10.2*   PLATELETS 10*3/mm3 362 337 314 341 337             Assessment / Plan     ASSESSMENT:  CKD 4 baseline creatinine 2.  Chronic cardiorenal syndrome.  Labs pending  2.  Heart failure preserved ejection fraction diuresing on IV Bumex and oral Aldactone.    3.  Right lower extremity cellulitis on Vanco and Kefzol.  4.  Hypertension.  Not optimally controlled.  Increase hydralazine dose  5.  Anemia CKD.  6.  Diabetes mellitus type II.  7.  Hypothyroid inadequately replaced.  Likely adding to her heart failure decompensation.  Supplement increased this admission .  PLAN:  Increase hydralazine to 75 mg every 8 hours.  Oral bumex today .  Check pending chemistries  Thank you for involving us in the care of Halie LIMA  Brea.  Please feel free to call with any questions.    Mary Rice MD  08/29/24  08:21 EDT    [facility]  [phone]    Please note that portions of this note were completed with a voice recognition program.

## 2024-08-30 LAB
ALBUMIN SERPL-MCNC: 3.1 G/DL (ref 3.5–5.2)
ANION GAP SERPL CALCULATED.3IONS-SCNC: 13.5 MMOL/L (ref 5–15)
BUN SERPL-MCNC: 46 MG/DL (ref 8–23)
BUN/CREAT SERPL: 19.6 (ref 7–25)
CALCIUM SPEC-SCNC: 8.6 MG/DL (ref 8.6–10.5)
CHLORIDE SERPL-SCNC: 106 MMOL/L (ref 98–107)
CO2 SERPL-SCNC: 20.5 MMOL/L (ref 22–29)
CREAT SERPL-MCNC: 2.35 MG/DL (ref 0.57–1)
CRYOGLOB SER QL 1D COLD INC: NORMAL
DEPRECATED RDW RBC AUTO: 47.1 FL (ref 37–54)
EGFRCR SERPLBLD CKD-EPI 2021: 20 ML/MIN/1.73
ERYTHROCYTE [DISTWIDTH] IN BLOOD BY AUTOMATED COUNT: 15 % (ref 12.3–15.4)
GLUCOSE BLDC GLUCOMTR-MCNC: 183 MG/DL (ref 70–130)
GLUCOSE BLDC GLUCOMTR-MCNC: 213 MG/DL (ref 70–130)
GLUCOSE BLDC GLUCOMTR-MCNC: 260 MG/DL (ref 70–130)
GLUCOSE BLDC GLUCOMTR-MCNC: 74 MG/DL (ref 70–130)
GLUCOSE SERPL-MCNC: 189 MG/DL (ref 65–99)
HBV SURFACE AG SERPL QL IA: NORMAL
HCT VFR BLD AUTO: 30.6 % (ref 34–46.6)
HCV AB SER QL: NORMAL
HGB BLD-MCNC: 9.8 G/DL (ref 12–15.9)
MAGNESIUM SERPL-MCNC: 1.8 MG/DL (ref 1.6–2.4)
MCH RBC QN AUTO: 27.5 PG (ref 26.6–33)
MCHC RBC AUTO-ENTMCNC: 32 G/DL (ref 31.5–35.7)
MCV RBC AUTO: 85.7 FL (ref 79–97)
PHOSPHATE SERPL-MCNC: 5.3 MG/DL (ref 2.5–4.5)
PLATELET # BLD AUTO: 365 10*3/MM3 (ref 140–450)
PMV BLD AUTO: 9.3 FL (ref 6–12)
POTASSIUM SERPL-SCNC: 3.9 MMOL/L (ref 3.5–5.2)
RBC # BLD AUTO: 3.57 10*6/MM3 (ref 3.77–5.28)
SODIUM SERPL-SCNC: 140 MMOL/L (ref 136–145)
WBC NRBC COR # BLD AUTO: 11.82 10*3/MM3 (ref 3.4–10.8)

## 2024-08-30 PROCEDURE — 63710000001 INSULIN LISPRO (HUMAN) PER 5 UNITS: Performed by: INTERNAL MEDICINE

## 2024-08-30 PROCEDURE — 87340 HEPATITIS B SURFACE AG IA: CPT | Performed by: HOSPITALIST

## 2024-08-30 PROCEDURE — 86707 HEPATITIS BE ANTIBODY: CPT | Performed by: HOSPITALIST

## 2024-08-30 PROCEDURE — 82595 ASSAY OF CRYOGLOBULIN: CPT | Performed by: HOSPITALIST

## 2024-08-30 PROCEDURE — 85027 COMPLETE CBC AUTOMATED: CPT | Performed by: STUDENT IN AN ORGANIZED HEALTH CARE EDUCATION/TRAINING PROGRAM

## 2024-08-30 PROCEDURE — 86225 DNA ANTIBODY NATIVE: CPT | Performed by: HOSPITALIST

## 2024-08-30 PROCEDURE — 97530 THERAPEUTIC ACTIVITIES: CPT

## 2024-08-30 PROCEDURE — 25010000002 HEPARIN (PORCINE) PER 1000 UNITS: Performed by: INTERNAL MEDICINE

## 2024-08-30 PROCEDURE — 80069 RENAL FUNCTION PANEL: CPT | Performed by: INTERNAL MEDICINE

## 2024-08-30 PROCEDURE — 97535 SELF CARE MNGMENT TRAINING: CPT

## 2024-08-30 PROCEDURE — 63710000001 INSULIN GLARGINE PER 5 UNITS: Performed by: STUDENT IN AN ORGANIZED HEALTH CARE EDUCATION/TRAINING PROGRAM

## 2024-08-30 PROCEDURE — 82948 REAGENT STRIP/BLOOD GLUCOSE: CPT

## 2024-08-30 PROCEDURE — 86803 HEPATITIS C AB TEST: CPT | Performed by: HOSPITALIST

## 2024-08-30 PROCEDURE — 83516 IMMUNOASSAY NONANTIBODY: CPT | Performed by: HOSPITALIST

## 2024-08-30 PROCEDURE — 83735 ASSAY OF MAGNESIUM: CPT | Performed by: STUDENT IN AN ORGANIZED HEALTH CARE EDUCATION/TRAINING PROGRAM

## 2024-08-30 RX ADMIN — Medication 2.5 MG: at 20:26

## 2024-08-30 RX ADMIN — BUMETANIDE 3 MG: 2 TABLET ORAL at 08:39

## 2024-08-30 RX ADMIN — HYDRALAZINE HYDROCHLORIDE 75 MG: 50 TABLET ORAL at 05:39

## 2024-08-30 RX ADMIN — INSULIN LISPRO 3 UNITS: 100 INJECTION, SOLUTION INTRAVENOUS; SUBCUTANEOUS at 20:24

## 2024-08-30 RX ADMIN — DULOXETINE HYDROCHLORIDE 30 MG: 30 CAPSULE, DELAYED RELEASE ORAL at 08:40

## 2024-08-30 RX ADMIN — DOXYCYCLINE 100 MG: 100 CAPSULE ORAL at 20:26

## 2024-08-30 RX ADMIN — INSULIN LISPRO 5 UNITS: 100 INJECTION, SOLUTION INTRAVENOUS; SUBCUTANEOUS at 08:38

## 2024-08-30 RX ADMIN — Medication 3 ML: at 20:26

## 2024-08-30 RX ADMIN — SPIRONOLACTONE 50 MG: 50 TABLET ORAL at 08:39

## 2024-08-30 RX ADMIN — PREGABALIN 50 MG: 50 CAPSULE ORAL at 20:26

## 2024-08-30 RX ADMIN — Medication 1 CAPSULE: at 08:39

## 2024-08-30 RX ADMIN — HEPARIN SODIUM 5000 UNITS: 5000 INJECTION INTRAVENOUS; SUBCUTANEOUS at 20:25

## 2024-08-30 RX ADMIN — DOXYCYCLINE 100 MG: 100 CAPSULE ORAL at 08:38

## 2024-08-30 RX ADMIN — HYDROCODONE BITARTRATE AND ACETAMINOPHEN 1 TABLET: 5; 325 TABLET ORAL at 20:32

## 2024-08-30 RX ADMIN — Medication 3 ML: at 08:40

## 2024-08-30 RX ADMIN — HYDROCODONE BITARTRATE AND ACETAMINOPHEN 1 TABLET: 5; 325 TABLET ORAL at 08:44

## 2024-08-30 RX ADMIN — BISOPROLOL FUMARATE 5 MG: 5 TABLET, FILM COATED ORAL at 08:38

## 2024-08-30 RX ADMIN — ATORVASTATIN CALCIUM 80 MG: 80 TABLET, FILM COATED ORAL at 20:26

## 2024-08-30 RX ADMIN — PANTOPRAZOLE SODIUM 40 MG: 40 TABLET, DELAYED RELEASE ORAL at 05:39

## 2024-08-30 RX ADMIN — LEVOTHYROXINE SODIUM 137 MCG: 137 TABLET ORAL at 05:39

## 2024-08-30 RX ADMIN — HYDRALAZINE HYDROCHLORIDE 75 MG: 50 TABLET ORAL at 21:22

## 2024-08-30 RX ADMIN — Medication 1 APPLICATION: at 08:40

## 2024-08-30 RX ADMIN — AMLODIPINE BESYLATE 2.5 MG: 2.5 TABLET ORAL at 08:38

## 2024-08-30 RX ADMIN — MUPIROCIN 1 APPLICATION: 20 OINTMENT TOPICAL at 09:32

## 2024-08-30 RX ADMIN — INSULIN LISPRO 10 UNITS: 100 INJECTION, SOLUTION INTRAVENOUS; SUBCUTANEOUS at 12:34

## 2024-08-30 RX ADMIN — BUMETANIDE 3 MG: 2 TABLET ORAL at 17:58

## 2024-08-30 RX ADMIN — HYDRALAZINE HYDROCHLORIDE 75 MG: 50 TABLET ORAL at 14:04

## 2024-08-30 RX ADMIN — INSULIN GLARGINE 30 UNITS: 100 INJECTION, SOLUTION SUBCUTANEOUS at 20:24

## 2024-08-30 RX ADMIN — Medication 1 APPLICATION: at 20:27

## 2024-08-30 RX ADMIN — HEPARIN SODIUM 5000 UNITS: 5000 INJECTION INTRAVENOUS; SUBCUTANEOUS at 08:38

## 2024-08-30 RX ADMIN — PREGABALIN 50 MG: 50 CAPSULE ORAL at 08:39

## 2024-08-30 RX ADMIN — INSULIN LISPRO 8 UNITS: 100 INJECTION, SOLUTION INTRAVENOUS; SUBCUTANEOUS at 12:34

## 2024-08-30 RX ADMIN — INSULIN LISPRO 10 UNITS: 100 INJECTION, SOLUTION INTRAVENOUS; SUBCUTANEOUS at 08:38

## 2024-08-30 NOTE — PLAN OF CARE
Goal Outcome Evaluation:  Plan of Care Reviewed With: patient        Progress: improving  Outcome Evaluation: c/o BLE pain.  BP elevated, scheduled med given.  blood sugar monitoring. purewick in place.  falls precaution maintained.

## 2024-08-30 NOTE — PLAN OF CARE
Goal Outcome Evaluation:  Plan of Care Reviewed With: patient        Progress: improving  Outcome Evaluation: Patient is agreebale to PT/OT this afternoon. She completed multiple STS requiring modAx2 and then when pulling up to sink requiring CGA. She ambulated 3ft to chair using rwx requiring minAx2. Pt reports 6/10 in BLE. Up in chair post session. PT rec SNF.      Anticipated Discharge Disposition (PT): skilled nursing facility

## 2024-08-30 NOTE — PROGRESS NOTES
Continued Stay Note  [facility]     Patient Name: Halie Guidry  MRN: [id number]  Today's Date: 8/30/2024    Admit Date: 8/24/2024    Plan: Return to [facility]   Discharge Plan       Row Name 08/30/24 [id number]       Plan    Plan Return to [facility]    Plan Comments Spoke to Rupali with [facility] at [phone] to discuss daily drsg changes. Read order to her of cleansing with soap and water, Pat dry. Apply Bactroban to open wound areas and cover with vasaline guaze. Secure with roll gauze. Rupali stated they would be able to do that without any troubles. Confirmed again with Rupali that prior to her coming to hospital, they were assisting patient with transfers, walking and etc.  Kwabena stated yes they were.                   Discharge Codes    No documentation.                 Expected Discharge Date and Time       Expected Discharge Date Expected Discharge Time    Sep 1, 2024               Vannessa Mcdowell RN

## 2024-08-30 NOTE — PLAN OF CARE
The pt participated in OT/PT this afternoon. She completed bed mobility with SBA. Mod A x2 to stand several times with the use of a walker. Chair brought close to her and she pivoted with the same level of assistance. Her chair was pushed to sinkside and she participated in several stands (CGA/Min A x2) while performing oral grooming. SNF recommended.

## 2024-08-30 NOTE — THERAPY TREATMENT NOTE
Patient Name: Halie Guidry  : 1940    MRN: [id number]                              Today's Date: 2024       Admit Date: 2024    Visit Dx:     ICD-10-CM ICD-9-CM   1. Cellulitis of lower extremity, unspecified laterality  L03.119 682.6   2. Bilateral lower extremity edema  R60.0 782.3   3. Chronic kidney disease, unspecified CKD stage  N18.9 585.9   4. History of diabetes mellitus  Z86.39 V12.29     Patient Active Problem List   Diagnosis    Compression fracture    Lumbar degenerative disc disease    Type 2 diabetes mellitus with hyperglycemia, with long-term current use of insulin    Hyperlipidemia    Benign essential hypertension    Primary hypothyroidism    Neuropathy    Osteoporosis    Proteinuria    Tobacco abuse    Generalized weakness    Spinal stenosis    Scoliosis    Arthritis    VBI (vertebrobasilar insufficiency)    Orthostatic hypotension    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    Severe hypothyroidism    Noncompliance with medication regimen    Vitamin D deficiency disease    Tremor    Seizure    Thoracic degenerative disc disease    Hyperglycemia    Type II diabetes mellitus, uncontrolled    Lower abdominal pain    Transaminitis    Emphysematous cystitis    Choledocholithiasis    Hypokalemia    Hypoxia    Generalized abdominal pain    History of Clostridium difficile infection    History of ERCP    Hypomagnesemia    Anxiety disorder    Neuropathic pain    Intertrigo    Weakness of both lower extremities    Accelerated hypertension    Acute cystitis without hematuria    Left lower lobe pneumonia    Cellulitis and abscess of left lower extremity    Benign hypertension with CKD (chronic kidney disease) stage IV    Peripheral edema    Primary malignant neuroendocrine tumor of pancreas    Encounter for long-term (current) use of high-risk medication    Diabetic foot ulcer    Stage 3a chronic kidney disease    Pressure injury of buttock, stage 2    HTN  (hypertension)    Anemia due to chronic kidney disease    Bilateral lower extremity edema    Cellulitis of right lower extremity    CKD (chronic kidney disease) stage 4, GFR 15-29 ml/min    Acute CHF    Bilateral cellulitis of lower leg     Past Medical History:   Diagnosis Date    Acute metabolic encephalopathy 06/24/2022    Anxiety     Arthritis     Benign essential hypertension 08/20/2014    Bleeding disorder     Depression     Diabetes     Diabetes mellitus, type 2     Disc degeneration, lumbar     Headache, tension-type     Hyperlipidemia     Hypothyroidism     Neuropathy     Osteoporosis 09/09/2015    Pancreatic mass     Sept 2023, on Monthly Octreotide Injection    Peripheral neuropathy     Rotator cuff tear, left     Scoliosis     Shoulder pain     LEFT, TORN ROTATOR CUFF S/P FALL    Spinal stenosis      Past Surgical History:   Procedure Laterality Date    APPENDECTOMY      BILATERAL BREAST REDUCTION Bilateral 08/2015    CATARACT EXTRACTION  03/2015    CHOLECYSTECTOMY WITH INTRAOPERATIVE CHOLANGIOGRAM N/A 3/27/2022    Procedure: CHOLECYSTECTOMY LAPAROSCOPIC INTRAOPERATIVE CHOLANGIOGRAM;  Surgeon: Aiyana Hill MD;  Location: [facility] MAIN OR;  Service: General;  Laterality: N/A;    COLONOSCOPY  06/05/2015    WNL    ERCP N/A 2/25/2022    Procedure: ENDOSCOPIC RETROGRADE CHOLANGIOPANCREATOGRAPHY with sphincterotomy and balloon sweep;  Surgeon: Chilo Wilhelm MD;  Location: [facility] ENDOSCOPY;  Service: Gastroenterology;  Laterality: N/A;  PRE/POST - CBD stones    ERCP N/A 3/28/2022    Procedure: ENDOSCOPIC RETROGRADE CHOLANGIOPANCREATOGRAPHY WITH SPHINCTEROTOMY AND BALLOON SWEEP;  Surgeon: Chilo Wilhelm MD;  Location: [facility] ENDOSCOPY;  Service: Gastroenterology;  Laterality: N/A;  PRE: COMMON DUCT STONE  POST: COMMON DUCT STONE    KYPHOPLASTY      REDUCTION MAMMAPLASTY      TONSILLECTOMY        General Information       Row Name 08/30/24 5823          Physical Therapy Time and Intention     Document Type therapy note (daily note)  -CB     Mode of Treatment co-treatment;physical therapy;occupational therapy  -CB       Row Name 08/30/24 1554          General Information    Existing Precautions/Restrictions fall  -CB       Row Name 08/30/24 1554          Cognition    Orientation Status (Cognition) oriented x 4  -CB       Row Name 08/30/24 1554          Safety Issues, Functional Mobility    Safety Issues Affecting Function (Mobility) insight into deficits/self-awareness;judgment;problem-solving;safety precaution awareness  -CB               User Key  (r) = Recorded By, (t) = Taken By, (c) = Cosigned By      Initials Name Provider Type    Manisha Bell, MELODIE Physical Therapist                   Mobility       Row Name 08/30/24 1555          Bed Mobility    Bed Mobility supine-sit  -CB     Supine-Sit [place] (Bed Mobility) standby assist  -CB     Assistive Device (Bed Mobility) bed rails  -CB       Row Name 08/30/24 1555          Sit-Stand Transfer    Sit-Stand [place] (Transfers) moderate assist (50% patient effort);2 person assist;verbal cues  -CB     Assistive Device (Sit-Stand Transfers) walker, front-wheeled  -CB     Comment, (Sit-Stand Transfer) x4 STS reps; last STS rep pulling up at sink CGA  -CB       Row Name 08/30/24 1555          Gait/Stairs (Locomotion)    [place] Level (Gait) 2 person assist;minimum assist (75% patient effort);verbal cues;nonverbal cues (demo/gesture)  -CB     Assistive Device (Gait) walker, front-wheeled  -CB     Distance in Feet (Gait) 5  -CB     Deviations/Abnormal Patterns (Gait) tess decreased;antalgic;stride length decreased  -CB     Bilateral Gait Deviations forward flexed posture;weight shift ability decreased  -CB     Comment, (Gait/Stairs) difficulty advancing LE this date  -CB               User Key  (r) = Recorded By, (t) = Taken By, (c) = Cosigned By      Initials Name Provider Type    Manisha Bell PT Physical Therapist                    Obj/Interventions       Row Name 08/30/24 1556          Balance    Balance Assessment standing static balance;standing dynamic balance;sitting dynamic balance;sitting static balance  -CB     Static Sitting Balance standby assist  -CB     Dynamic Sitting Balance standby assist  -CB     Position, Sitting Balance sitting edge of bed  -CB     Static Standing Balance minimal assist;2-person assist;verbal cues  -CB     Dynamic Standing Balance minimal assist;2-person assist;verbal cues  -CB     Position/Device Used, Standing Balance supported;walker, front-wheeled  -CB     Balance Interventions sitting;standing;sit to stand;supported;static;dynamic;minimal challenge  -CB               User Key  (r) = Recorded By, (t) = Taken By, (c) = Cosigned By      Initials Name Provider Type    CB Manisha Su, MELODIE Physical Therapist                   Goals/Plan    No documentation.                  Clinical Impression       Row Name 08/30/24 1556          Pain    Pretreatment Pain Rating 6/10  -CB     Posttreatment Pain Rating 6/10  -CB     Pain Location - Side/Orientation Bilateral  -CB     Pain Location lower  -CB     Pain Location - extremity  -CB     Pain Intervention(s) Repositioned;Rest;Ambulation/increased activity;Elevated  -CB       Row Name 08/30/24 1556          Plan of Care Review    Plan of Care Reviewed With patient  -CB     Progress improving  -CB     Outcome Evaluation Patient is agreebale to PT/OT this afternoon. She completed multiple STS requiring modAx2 and then when pulling up to sink requiring CGA. She ambulated 3ft to chair using rwx requiring minAx2. Pt reports 6/10 in BLE. Up in chair post session. PT rec SNF.  -CB       Row Name 08/30/24 1556          Positioning and Restraints    Pre-Treatment Position in bed  -CB     Post Treatment Position chair  -CB     In Chair notified nsg;reclined;call light within reach;encouraged to call for assist;exit alarm on  -CB               User Key  (r) = Recorded By, (t) =  Taken By, (c) = Cosigned By      Initials Name Provider Type    Manisha Bell, PT Physical Therapist                   Outcome Measures       Row Name 08/30/24 1558 08/30/24 0844       How much help from another person do you currently need...    Turning from your back to your side while in flat bed without using bedrails? 3  -CB 3  -MA    Moving from lying on back to sitting on the side of a flat bed without bedrails? 3  -CB 2  -MA    Moving to and from a bed to a chair (including a wheelchair)? 2  -CB 2  -MA    Standing up from a chair using your arms (e.g., wheelchair, bedside chair)? 2  -CB 2  -MA    Climbing 3-5 steps with a railing? 1  -CB 1  -MA    To walk in hospital room? 2  -CB 2  -MA    AM-PAC 6 Clicks Score (PT) 13  -CB 12  -MA    Highest Level of Mobility Goal 4 --> Transfer to chair/commode  -CB 4 --> Transfer to chair/commode  -MA      Row Name 08/30/24 1558          Functional Assessment    Outcome Measure Options AM-PAC 6 Clicks Basic Mobility (PT)  -CB               User Key  (r) = Recorded By, (t) = Taken By, (c) = Cosigned By      Initials Name Provider Type    Mame Rodriguez RN Registered Nurse    Manisha Bell, MELODIE Physical Therapist                                 Physical Therapy Education       Title: PT OT SLP Therapies (Done)       Topic: Physical Therapy (Done)       Point: Mobility training (Done)       Learning Progress Summary             Patient Acceptance, E,TB, VU,NR by SUSAN at 8/30/2024 1559    Acceptance, E,TB, VU,NR by SUSAN at 8/29/2024 1440    Acceptance, E, VU,NR by EMILY at 8/29/2024 0428    Acceptance, E,TB,D, VU,NR by JOSE ALBERTO at 8/28/2024 1631    Acceptance, E, VU by ESPINOZA at 8/25/2024 1435                         Point: Home exercise program (Done)       Learning Progress Summary             Patient Acceptance, E,TB, VU,NR by SUSAN at 8/29/2024 1440    Acceptance, E, VU,NR by EMILY at 8/29/2024 0428    Acceptance, E,TB,D, VU,NR by JOSE ALBERTO at 8/28/2024 1631                          Point: Body mechanics (Done)       Learning Progress Summary             Patient Acceptance, E,TB, VU,NR by CB at 8/30/2024 1559    Acceptance, E,TB, VU,NR by CB at 8/29/2024 1440    Acceptance, E, VU,NR by  at 8/29/2024 0428    Acceptance, E,TB,D, VU,NR by  at 8/28/2024 1631    Acceptance, E, VU by  at 8/25/2024 1435                         Point: Precautions (Done)       Learning Progress Summary             Patient Acceptance, E,TB, VU,NR by CB at 8/30/2024 1559    Acceptance, E,TB, VU,NR by CB at 8/29/2024 1440    Acceptance, E, VU,NR by  at 8/29/2024 0428    Acceptance, E,TB,D, VU,NR by  at 8/28/2024 1631    Acceptance, E, VU by  at 8/25/2024 1435                                         User Key       Initials Effective Dates Name Provider Type Discipline     03/07/18 -  Beena Ray, PTA Physical Therapist Assistant PT     10/22/21 -  Manisha Su, PT Physical Therapist PT     01/16/24 -  Kenya Ruiz, PT Physical Therapist PT     06/25/24 -  Josephine Puckett, RN Registered Nurse Nurse                  PT Recommendation and Plan     Plan of Care Reviewed With: patient  Progress: improving  Outcome Evaluation: Patient is agreebale to PT/OT this afternoon. She completed multiple STS requiring modAx2 and then when pulling up to sink requiring CGA. She ambulated 3ft to chair using rwx requiring minAx2. Pt reports 6/10 in BLE. Up in chair post session. PT rec SNF.     Time Calculation:         PT Charges       Row Name 08/30/24 1600             Time Calculation    Start Time 1436  -CB      Stop Time 1452  -CB      Time Calculation (min) 16 min  -CB      PT Received On 08/30/24  -CB      PT - Next Appointment 09/02/24  -CB         Time Calculation- PT    Total Timed Code Minutes- PT 16 minute(s)  -CB         Timed Charges    26852 - PT Therapeutic Activity Minutes 16  -CB         Total Minutes    Timed Charges Total Minutes 16  -CB       Total Minutes 16  -CB                User Key   (r) = Recorded By, (t) = Taken By, (c) = Cosigned By      Initials Name Provider Type    CB Manisha Su, PT Physical Therapist                  Therapy Charges for Today       Code Description Service Date Service Provider Modifiers Qty    30607240680 HC PT THERAPEUTIC ACT EA 15 MIN 8/29/2024 Manisha Su, PT GP 1    52228111681 HC PT THERAPEUTIC ACT EA 15 MIN 8/30/2024 Manisha Su, PT GP 1            PT G-Codes  Outcome Measure Options: AM-PAC 6 Clicks Basic Mobility (PT)  AM-PAC 6 Clicks Score (PT): 13  AM-PAC 6 Clicks Score (OT): 16  PT Discharge Summary  Anticipated Discharge Disposition (PT): skilled nursing facility    Manisha Su PT  8/30/2024

## 2024-08-30 NOTE — PLAN OF CARE
Goal Outcome Evaluation:  VSS, sliding scale coverage for elevated blood glucose, B/L lower extremities with kerlix dressing, small amount drainage on right leg, dressings changed, tolerated well, pure wick in use, up to BSC with assist, gait belt, & walker, up in chair, falls protocol, bed/chair alarm in use  Plan of Care Reviewed With: patient

## 2024-08-30 NOTE — PROGRESS NOTES
[facility] Progress Note      Patient Name: Haile Guidry  : 1940  MRN: [id number]  Primary Care Physician:  Napoleon Mead MD  Date of admission: 2024    Subjective     Interval History:   F/U CKD 4    Urine output 2,100.  Weight not consistent but down 4 lbs.    Breathing better. On RA. Chronic orthopnea.   Blood pressure still elevated.    Legs  but edema better.  C/o heel pain.   Denies n/v/d. No urinary symptoms.     Review of Systems:   As noted above    Objective     Vitals:   Temp:  [96.7 °F (35.9 °C)-98.1 °F (36.7 °C)] 97.2 °F (36.2 °C)  Heart Rate:  [57-67] 60  Resp:  [18] 18  BP: (145-175)/(62-73) 159/71    Intake/Output Summary (Last 24 hours) at 2024 0808  Last data filed at 2024 0507  Gross per 24 hour   Intake 360 ml   Output 2100 ml   Net -1740 ml       Physical Exam:    General Appearance: alert, oriented x 3, no acute distress.  Chronically ill.    Skin: warm and dry  HEENT: oral mucosa normal, nonicteric sclera  Neck: supple, no JVD  Lungs: Fine bibasilar crackles.  No wheezing. RA  Heart: RRR, normal S1 and S2  Abdomen: soft, nontender, nondistended. +BS  : no palpable bladder, external urinary catheter  Extremities: 1+ lower extremity edema to knees.  Both legs wrapped.  Neuro: normal speech and mental status     Scheduled Meds:     amLODIPine, 2.5 mg, Oral, Daily  atorvastatin, 80 mg, Oral, Nightly  bisoprolol, 5 mg, Oral, Daily  bumetanide, 3 mg, Oral, BID  doxycycline, 100 mg, Oral, Q12H  DULoxetine, 30 mg, Oral, Daily  ferrous sulfate, 325 mg, Oral, Every Other Day  heparin (porcine), 5,000 Units, Subcutaneous, Q12H  hydrALAZINE, 75 mg, Oral, Q8H  hydrocortisone-bacitracin-zinc oxide-nystatin, 1 Application, Topical, BID  insulin glargine, 60 Units, Subcutaneous, Nightly  insulin lispro, 10 Units, Subcutaneous, TID AC  insulin lispro, 3-14 Units, Subcutaneous, 4x Daily AC & at Bedtime  lactobacillus acidophilus, 1  capsule, Oral, Daily  levothyroxine, 137 mcg, Oral, Q AM  melatonin, 2.5 mg, Oral, Nightly  mupirocin, 1 Application, Topical, Daily  pantoprazole, 40 mg, Oral, Q AM  pregabalin, 50 mg, Oral, BID  sodium chloride, 3 mL, Intravenous, Q12H  spironolactone, 50 mg, Oral, Daily      IV Meds:          Results Reviewed:   I have personally reviewed the results from the time of this admission to 8/30/2024 08:08 EDT     Results from last 7 days   Lab Units 08/30/24  0310 08/29/24  0756 08/28/24  0648 08/27/24  0615 08/26/24  0540   SODIUM mmol/L 140 138 139   < > 134*   POTASSIUM mmol/L 3.9 3.6 4.4   < > 3.6   CHLORIDE mmol/L 106 103 102   < > 100   CO2 mmol/L 20.5* 21.7* 22.8   < > 21.8*   BUN mg/dL 46* 42* 36*   < > 24*   CREATININE mg/dL 2.35* 2.28* 2.25*   < > 2.27*   CALCIUM mg/dL 8.6 8.5* 8.5*   < > 8.3*   BILIRUBIN mg/dL  --   --   --   --  <0.2   ALK PHOS U/L  --   --   --   --  130*   ALT (SGPT) U/L  --   --   --   --  <5   AST (SGOT) U/L  --   --   --   --  12   GLUCOSE mg/dL 189* 174* 82   < > 175*    < > = values in this interval not displayed.       Estimated Creatinine Clearance: 21.7 mL/min (A) (by C-G formula based on SCr of 2.35 mg/dL (H)).    Results from last 7 days   Lab Units 08/30/24  0310 08/29/24  0756 08/29/24  0504 08/28/24  0648   MAGNESIUM mg/dL 1.8  --  1.9 2.0   PHOSPHORUS mg/dL 5.3* 5.0*  --  5.2*             Results from last 7 days   Lab Units 08/30/24  0310 08/29/24  0756 08/28/24  0648 08/27/24  0615 08/26/24  0540   WBC 10*3/mm3 11.82* 11.24* 11.16* 8.80 9.05   HEMOGLOBIN g/dL 9.8* 9.9* 10.0* 9.8* 9.5*   PLATELETS 10*3/mm3 365 356 362 337 314             Assessment / Plan     ASSESSMENT:  CKD 4 baseline creatinine 2.  Chronic cardiorenal syndrome.  Kidney function currently stable. Electrolytes acceptable.   2.  Heart failure preserved ejection fraction diuresing: oral Bumex restarted 8/29/24 and oral Aldactone.    3.  Right lower extremity cellulitis on Vanco and Kefzol.  4.   Hypertension.  Not optimally controlled.  Hydralazine dose increased 8/29/24.  5.  Anemia CKD. Stable.   6.  Diabetes mellitus type II.  SSI per primary.   7.  Hypothyroid inadequately replaced.  Likely adding to her heart failure decompensation.  Supplement increased this admission.  8.  Nephrotic range proteinuria.  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 We will await serology   Labs in AM     Thank you for involving us in the care of Halie Guidry.  Please feel free to call with any questions.    Brendon Aguilar MD   08/30/24  08:08 EDT    [facility]  [phone]    Please note that portions of this note were completed with a voice recognition program.

## 2024-08-30 NOTE — PROGRESS NOTES
Name: Halie Guidry ADMIT: 2024   : 1940  PCP: Napoleon Mead MD    MRN: [id number] LOS: 5 days   AGE/SEX: 84 y.o. female  ROOM: [facility]     Subjective   Subjective   Feels about the same, no new complaints.  Reports shortness of breath better, on room air.  Continues to have pain in her heels. .    Review of Systems   As above  Objective   Objective   Vital Signs  Temp:  [97 °F (36.1 °C)-98.1 °F (36.7 °C)] 97.3 °F (36.3 °C)  Heart Rate:  [60-67] 61  Resp:  [16-18] 16  BP: (132-171)/(68-73) 137/70  SpO2:  [94 %-97 %] 97 %  on   ;   Device (Oxygen Therapy): room air  Body mass index is 32.41 kg/m².  Physical Exam    General: Alert sitting up in the bed, not in distress,  HEENT: Normocephalic, atraumatic  CV: Regular rate and rhythm, no murmurs rubs or gallops  Lungs: Clear to auscultation bilaterally, no crackles or wheezes  Abdomen: Soft, nontender, nondistended  Extremities: Bilateral lower extremities wrapped, 1+ edema.    Results Review     I reviewed the patient's new clinical results.  Results from last 7 days   Lab Units 24  03124  0756 24  0648 24  0615   WBC 10*3/mm3 11.82* 11.24* 11.16* 8.80   HEMOGLOBIN g/dL 9.8* 9.9* 10.0* 9.8*   PLATELETS 10*3/mm3 365 356 362 337     Results from last 7 days   Lab Units 24  03124  0756 24  0648 24  0615   SODIUM mmol/L 140 138 139 138   POTASSIUM mmol/L 3.9 3.6 4.4 3.7   CHLORIDE mmol/L 106 103 102 105   CO2 mmol/L 20.5* 21.7* 22.8 22.0   BUN mg/dL 46* 42* 36* 32*   CREATININE mg/dL 2.35* 2.28* 2.25* 2.16*   GLUCOSE mg/dL 189* 174* 82 178*   Estimated Creatinine Clearance: 21.7 mL/min (A) (by C-G formula based on SCr of 2.35 mg/dL (H)).  Results from last 7 days   Lab Units 24  0310 24  0756 24  0648 24  0615 24  1137 24  0540   ALBUMIN g/dL 3.1* 3.2* 3.1* 3.2*   < > 3.2*   BILIRUBIN mg/dL  --   --   --   --   --  <0.2   ALK PHOS U/L  --   --   --   --   --   130*   AST (SGOT) U/L  --   --   --   --   --  12   ALT (SGPT) U/L  --   --   --   --   --  <5    < > = values in this interval not displayed.     Results from last 7 days   Lab Units 08/30/24  0310 08/29/24  0756 08/29/24  0504 08/28/24  0648 08/27/24  0615   CALCIUM mg/dL 8.6 8.5*  --  8.5* 8.4*   ALBUMIN g/dL 3.1* 3.2*  --  3.1* 3.2*   MAGNESIUM mg/dL 1.8  --  1.9 2.0 2.2   PHOSPHORUS mg/dL 5.3* 5.0*  --  5.2* 4.8*     Results from last 7 days   Lab Units 08/24/24 2002   LACTATE mmol/L 1.2     COVID19   Date Value Ref Range Status   01/27/2024 Not Detected Not Detected - Ref. Range Final   01/09/2024 Detected (C) Not Detected - Ref. Range Final     Glucose   Date/Time Value Ref Range Status   08/30/2024 1136 260 (H) 70 - 130 mg/dL Final   08/30/2024 0635 213 (H) 70 - 130 mg/dL Final   08/29/2024 2135 195 (H) 70 - 130 mg/dL Final   08/29/2024 1625 181 (H) 70 - 130 mg/dL Final   08/29/2024 1213 157 (H) 70 - 130 mg/dL Final   08/29/2024 0524 198 (H) 70 - 130 mg/dL Final   08/28/2024 2119 238 (H) 70 - 130 mg/dL Final           XR Chest PA & Lateral  Narrative: CHEST: 2 VIEWS     HISTORY: Dyspnea     COMPARISON: AP chest 01/27/2024, 01/09/2024, AP chest 05/13/2023.     FINDINGS: Heart size is mildly enlarged. There is increased linear  opacity in the left lung base that is most likely subsegmental  atelectasis but could be related to scarring. There is a calcified  nodule in the right midlung. Lung volumes are diminished. There is no  evidence for pulmonary edema. There is healed or healing right lateral  rib fracture with adjacent pleural thickening and this fracture was  demonstrated on previous PET/CT 06/17/2024.      Impression: Diminished lung volumes with left basilar subsegmental  atelectasis or scarring.     This report was finalized on 8/26/2024 6:27 PM by Blake Murphy M.D  on Workstation: [id number]     Adult Transthoracic Echo Complete W/ Cont if Necessary Per Protocol    Left ventricular systolic  function is normal. Calculated left   ventricular EF = 61.8% Normal left ventricular cavity size noted. Left   ventricular wall thickness is consistent with moderate concentric   hypertrophy. All left ventricular wall segments contract normally. Left   ventricular diastolic function was normal.    The left atrial cavity is moderately dilated.    There is moderate calcification of the aortic valve. The aortic valve   appears trileaflet. Trace aortic valve regurgitation is present. No aortic   valve stenosis is present.    Moderate mitral annular calcification is present. Mild mitral valve   regurgitation is present with multiple jets noted. No significant mitral   valve stenosis is present.    Scheduled Medications  amLODIPine, 2.5 mg, Oral, Daily  atorvastatin, 80 mg, Oral, Nightly  bisoprolol, 5 mg, Oral, Daily  bumetanide, 3 mg, Oral, BID  doxycycline, 100 mg, Oral, Q12H  DULoxetine, 30 mg, Oral, Daily  ferrous sulfate, 325 mg, Oral, Every Other Day  heparin (porcine), 5,000 Units, Subcutaneous, Q12H  hydrALAZINE, 75 mg, Oral, Q8H  hydrocortisone-bacitracin-zinc oxide-nystatin, 1 Application, Topical, BID  insulin glargine, 60 Units, Subcutaneous, Nightly  insulin lispro, 10 Units, Subcutaneous, TID AC  insulin lispro, 3-14 Units, Subcutaneous, 4x Daily AC & at Bedtime  lactobacillus acidophilus, 1 capsule, Oral, Daily  levothyroxine, 137 mcg, Oral, Q AM  melatonin, 2.5 mg, Oral, Nightly  mupirocin, 1 Application, Topical, Daily  pantoprazole, 40 mg, Oral, Q AM  pregabalin, 50 mg, Oral, BID  sodium chloride, 3 mL, Intravenous, Q12H  spironolactone, 50 mg, Oral, Daily    Infusions   Diet  Diet: Diabetic, Cardiac; Healthy Heart (2-3 Na+); Consistent Carbohydrate; Fluid Consistency: Thin (IDDSI 0)    I have personally reviewed     [x]  Laboratory   [x]  Microbiology   []  Radiology   []  EKG/Telemetry  []  Cardiology/Vascular   []  Pathology    []  Records       Assessment/Plan     Active Hospital Problems     Diagnosis  POA    **Cellulitis of right lower extremity [L03.115]  Yes    Bilateral cellulitis of lower leg [L03.116, L03.115]  Yes    Bilateral lower extremity edema [R60.0]  Yes    CKD (chronic kidney disease) stage 4, GFR 15-29 ml/min [N18.4]  Yes    Acute CHF [I50.9]  Yes    Anemia due to chronic kidney disease [N18.9, D63.1]  Yes    Primary malignant neuroendocrine tumor of pancreas [C7A.8]  Yes    Peripheral edema [R60.0]  Yes    Accelerated hypertension [I10]  Yes    Generalized weakness [R53.1]  Yes    Hyperlipidemia [E78.5]  Yes    Type 2 diabetes mellitus with hyperglycemia, with long-term current use of insulin [E11.65, Z79.4]  Not Applicable    Neuropathy [G62.9]  Yes    Benign essential hypertension [I10]  Yes      Resolved Hospital Problems   No resolved problems to display.       84-year-old female presents the hospital with concern for leg cellulitis right greater than left with multiple leg wounds.     Cellulitis of the leg right greater than left with bilateral leg wounds:  -Initially patient had some wound drainage concerning for impetigo no no more drainage noted  -Status post IV cefazolin vancomycin  -Currently on p.o. doxycycline,  -Wound care  - swelling improving     Acute CHF, anasarca:  -Echocardiogram 08/26/2024 EF of 61.8%,  Nephrology managing diuresis, n.p.o. Bumex     CKD 4:  -Creatinine appears to be slightly above baseline  -Protein to creatinine ratio 7.974, nephrotic range, likely secondary to diabetes  -Creatinine stable, nephrology managing workup pending  -     Anemia  -Chart review iron panel last year was consistent with iron deficient anemia  -Repeat iron panel 08/26/2024 mixed, consistent with anemia of chronic disease and iron deficiency anemia  -Initiated on p.o. iron every other day  -Hemoglobin stable     Type 2 diabetes:   -A1c 7.9.  Monitor blood sugar and adjust insulin as needed.    -Blood glucose stable on current regimen  -Blood glucose in 200s, increase Lantus 70  units nightly, continue scheduled lispro 10 units 3 times daily with meals, SSI       Essential hypertension:   BP improved, continue current regimen     Neuropathy:   Decrease Lyrica dose due to CKD.  Continue low-dose chronic hydrocodone.     Neuroendocrine tumor:   management currently with oncology.     Hyperlipidemia: Continue statin.  Stable.     Hypokalemia  -WNL     Hypothyroidism  -TSH elevated at 21, free T4 and free T3 low 08/27/2024  -Increase levothyroxine to 137 mcg daily from 112 mcg daily  -Will need repeat labs in 4 weeks      Heparin SC for DVT prophylaxis.  Full code.  Discussed with patient.  Return to [facility], likely 1 to 2 days and if cleared by nephrology  Expected Discharge Date: 9/1/2024; Expected Discharge Time:        Copied text in this note has been reviewed and is accurate as of 08/30/24.         Dictated utilizing Dragon dictation        Balbina Painting MD  [facility]  08/30/24  14:39 EDT

## 2024-08-30 NOTE — PLAN OF CARE
Goal Outcome Evaluation:  Plan of Care Reviewed With: patient        Progress: improving  Outcome Evaluation: BLE dressing change done this AM, Norco given PRN. Fall precautions maintained. Turn Q2H. Purewick in place. Saline locked. NCS diet. Accu-checks elevated, scheduled & sliding scale Insulin given. PT/OT working with patient. Return to assisted living facility upon discharge.

## 2024-08-31 LAB
ALBUMIN SERPL-MCNC: 3.2 G/DL (ref 3.5–5.2)
ANION GAP SERPL CALCULATED.3IONS-SCNC: 14.1 MMOL/L (ref 5–15)
BUN SERPL-MCNC: 54 MG/DL (ref 8–23)
BUN/CREAT SERPL: 21.5 (ref 7–25)
CALCIUM SPEC-SCNC: 8.5 MG/DL (ref 8.6–10.5)
CHLORIDE SERPL-SCNC: 102 MMOL/L (ref 98–107)
CO2 SERPL-SCNC: 19.9 MMOL/L (ref 22–29)
CREAT SERPL-MCNC: 2.51 MG/DL (ref 0.57–1)
DEPRECATED RDW RBC AUTO: 47.1 FL (ref 37–54)
DSDNA AB SER-ACNC: <1 IU/ML (ref 0–9)
EGFRCR SERPLBLD CKD-EPI 2021: 18.4 ML/MIN/1.73
ERYTHROCYTE [DISTWIDTH] IN BLOOD BY AUTOMATED COUNT: 15.2 % (ref 12.3–15.4)
GLUCOSE BLDC GLUCOMTR-MCNC: 166 MG/DL (ref 70–130)
GLUCOSE BLDC GLUCOMTR-MCNC: 207 MG/DL (ref 70–130)
GLUCOSE BLDC GLUCOMTR-MCNC: 245 MG/DL (ref 70–130)
GLUCOSE BLDC GLUCOMTR-MCNC: 325 MG/DL (ref 70–130)
GLUCOSE SERPL-MCNC: 222 MG/DL (ref 65–99)
HBV E AB SERPL QL IA: NON REACTIVE
HCT VFR BLD AUTO: 31.8 % (ref 34–46.6)
HGB BLD-MCNC: 10 G/DL (ref 12–15.9)
MAGNESIUM SERPL-MCNC: 1.7 MG/DL (ref 1.6–2.4)
MCH RBC QN AUTO: 27.2 PG (ref 26.6–33)
MCHC RBC AUTO-ENTMCNC: 31.4 G/DL (ref 31.5–35.7)
MCV RBC AUTO: 86.4 FL (ref 79–97)
PHOSPHATE SERPL-MCNC: 5.5 MG/DL (ref 2.5–4.5)
PLATELET # BLD AUTO: 375 10*3/MM3 (ref 140–450)
PMV BLD AUTO: 9.6 FL (ref 6–12)
POTASSIUM SERPL-SCNC: 4 MMOL/L (ref 3.5–5.2)
RBC # BLD AUTO: 3.68 10*6/MM3 (ref 3.77–5.28)
SODIUM SERPL-SCNC: 136 MMOL/L (ref 136–145)
WBC NRBC COR # BLD AUTO: 12.35 10*3/MM3 (ref 3.4–10.8)

## 2024-08-31 PROCEDURE — 63710000001 INSULIN GLARGINE PER 5 UNITS: Performed by: STUDENT IN AN ORGANIZED HEALTH CARE EDUCATION/TRAINING PROGRAM

## 2024-08-31 PROCEDURE — 80069 RENAL FUNCTION PANEL: CPT | Performed by: INTERNAL MEDICINE

## 2024-08-31 PROCEDURE — 63710000001 INSULIN LISPRO (HUMAN) PER 5 UNITS: Performed by: INTERNAL MEDICINE

## 2024-08-31 PROCEDURE — 85027 COMPLETE CBC AUTOMATED: CPT | Performed by: STUDENT IN AN ORGANIZED HEALTH CARE EDUCATION/TRAINING PROGRAM

## 2024-08-31 PROCEDURE — 82948 REAGENT STRIP/BLOOD GLUCOSE: CPT

## 2024-08-31 PROCEDURE — 25010000002 MAGNESIUM SULFATE 2 GM/50ML SOLUTION: Performed by: HOSPITALIST

## 2024-08-31 PROCEDURE — 25010000002 HEPARIN (PORCINE) PER 1000 UNITS: Performed by: INTERNAL MEDICINE

## 2024-08-31 PROCEDURE — 83735 ASSAY OF MAGNESIUM: CPT | Performed by: STUDENT IN AN ORGANIZED HEALTH CARE EDUCATION/TRAINING PROGRAM

## 2024-08-31 PROCEDURE — 25010000002 CEFAZOLIN PER 500 MG: Performed by: STUDENT IN AN ORGANIZED HEALTH CARE EDUCATION/TRAINING PROGRAM

## 2024-08-31 RX ORDER — BUMETANIDE 2 MG/1
2 TABLET ORAL
Status: DISCONTINUED | OUTPATIENT
Start: 2024-08-31 | End: 2024-09-04 | Stop reason: HOSPADM

## 2024-08-31 RX ORDER — HYDRALAZINE HYDROCHLORIDE 50 MG/1
100 TABLET, FILM COATED ORAL EVERY 8 HOURS SCHEDULED
Status: DISCONTINUED | OUTPATIENT
Start: 2024-08-31 | End: 2024-09-04

## 2024-08-31 RX ORDER — TERAZOSIN 1 MG/1
1 CAPSULE ORAL NIGHTLY
Status: DISCONTINUED | OUTPATIENT
Start: 2024-08-31 | End: 2024-09-04 | Stop reason: HOSPADM

## 2024-08-31 RX ORDER — MAGNESIUM SULFATE HEPTAHYDRATE 40 MG/ML
2 INJECTION, SOLUTION INTRAVENOUS ONCE
Status: COMPLETED | OUTPATIENT
Start: 2024-08-31 | End: 2024-08-31

## 2024-08-31 RX ADMIN — HEPARIN SODIUM 5000 UNITS: 5000 INJECTION INTRAVENOUS; SUBCUTANEOUS at 08:17

## 2024-08-31 RX ADMIN — Medication 1 APPLICATION: at 21:21

## 2024-08-31 RX ADMIN — PREGABALIN 50 MG: 50 CAPSULE ORAL at 21:21

## 2024-08-31 RX ADMIN — MUPIROCIN 1 APPLICATION: 20 OINTMENT TOPICAL at 09:55

## 2024-08-31 RX ADMIN — HYDRALAZINE HYDROCHLORIDE 100 MG: 50 TABLET ORAL at 21:32

## 2024-08-31 RX ADMIN — PREGABALIN 50 MG: 50 CAPSULE ORAL at 08:28

## 2024-08-31 RX ADMIN — FERROUS SULFATE TAB 325 MG (65 MG ELEMENTAL FE) 325 MG: 325 (65 FE) TAB at 08:17

## 2024-08-31 RX ADMIN — INSULIN GLARGINE 50 UNITS: 100 INJECTION, SOLUTION SUBCUTANEOUS at 21:34

## 2024-08-31 RX ADMIN — HYDROCODONE BITARTRATE AND ACETAMINOPHEN 1 TABLET: 5; 325 TABLET ORAL at 06:01

## 2024-08-31 RX ADMIN — HYDRALAZINE HYDROCHLORIDE 100 MG: 50 TABLET ORAL at 15:15

## 2024-08-31 RX ADMIN — Medication 3 ML: at 21:22

## 2024-08-31 RX ADMIN — Medication 1 CAPSULE: at 08:18

## 2024-08-31 RX ADMIN — SPIRONOLACTONE 50 MG: 50 TABLET ORAL at 08:17

## 2024-08-31 RX ADMIN — BUMETANIDE 2 MG: 2 TABLET ORAL at 17:37

## 2024-08-31 RX ADMIN — ATORVASTATIN CALCIUM 80 MG: 80 TABLET, FILM COATED ORAL at 21:25

## 2024-08-31 RX ADMIN — DOXYCYCLINE 100 MG: 100 CAPSULE ORAL at 21:21

## 2024-08-31 RX ADMIN — INSULIN LISPRO 10 UNITS: 100 INJECTION, SOLUTION INTRAVENOUS; SUBCUTANEOUS at 12:15

## 2024-08-31 RX ADMIN — INSULIN LISPRO 10 UNITS: 100 INJECTION, SOLUTION INTRAVENOUS; SUBCUTANEOUS at 17:37

## 2024-08-31 RX ADMIN — INSULIN LISPRO 5 UNITS: 100 INJECTION, SOLUTION INTRAVENOUS; SUBCUTANEOUS at 08:16

## 2024-08-31 RX ADMIN — PANTOPRAZOLE SODIUM 40 MG: 40 TABLET, DELAYED RELEASE ORAL at 05:57

## 2024-08-31 RX ADMIN — HYDRALAZINE HYDROCHLORIDE 75 MG: 50 TABLET ORAL at 05:57

## 2024-08-31 RX ADMIN — TERAZOSIN 1 MG: 1 CAPSULE ORAL at 21:25

## 2024-08-31 RX ADMIN — HEPARIN SODIUM 5000 UNITS: 5000 INJECTION INTRAVENOUS; SUBCUTANEOUS at 21:21

## 2024-08-31 RX ADMIN — MAGNESIUM SULFATE HEPTAHYDRATE 2 G: 40 INJECTION, SOLUTION INTRAVENOUS at 10:14

## 2024-08-31 RX ADMIN — Medication 3 ML: at 08:19

## 2024-08-31 RX ADMIN — Medication 1 APPLICATION: at 10:02

## 2024-08-31 RX ADMIN — Medication 2.5 MG: at 21:21

## 2024-08-31 RX ADMIN — BISOPROLOL FUMARATE 5 MG: 5 TABLET, FILM COATED ORAL at 08:18

## 2024-08-31 RX ADMIN — INSULIN LISPRO 5 UNITS: 100 INJECTION, SOLUTION INTRAVENOUS; SUBCUTANEOUS at 21:34

## 2024-08-31 RX ADMIN — SODIUM CHLORIDE 2000 MG: 900 INJECTION INTRAVENOUS at 23:49

## 2024-08-31 RX ADMIN — BUMETANIDE 3 MG: 2 TABLET ORAL at 08:18

## 2024-08-31 RX ADMIN — DOXYCYCLINE 100 MG: 100 CAPSULE ORAL at 08:18

## 2024-08-31 RX ADMIN — INSULIN LISPRO 10 UNITS: 100 INJECTION, SOLUTION INTRAVENOUS; SUBCUTANEOUS at 08:16

## 2024-08-31 RX ADMIN — ACETAMINOPHEN 325MG 650 MG: 325 TABLET ORAL at 10:16

## 2024-08-31 RX ADMIN — DULOXETINE HYDROCHLORIDE 30 MG: 30 CAPSULE, DELAYED RELEASE ORAL at 08:17

## 2024-08-31 RX ADMIN — AMLODIPINE BESYLATE 2.5 MG: 2.5 TABLET ORAL at 08:18

## 2024-08-31 RX ADMIN — SODIUM CHLORIDE 2000 MG: 900 INJECTION INTRAVENOUS at 15:16

## 2024-08-31 RX ADMIN — HYDROCODONE BITARTRATE AND ACETAMINOPHEN 1 TABLET: 5; 325 TABLET ORAL at 17:37

## 2024-08-31 RX ADMIN — LEVOTHYROXINE SODIUM 137 MCG: 137 TABLET ORAL at 05:57

## 2024-08-31 RX ADMIN — INSULIN LISPRO 3 UNITS: 100 INJECTION, SOLUTION INTRAVENOUS; SUBCUTANEOUS at 17:37

## 2024-08-31 NOTE — PLAN OF CARE
Goal Outcome Evaluation:  Plan of Care Reviewed With: patient        Progress: improving  Outcome Evaluation: VSS, Lortab given for BLE pain.  BLE wrapped in kerlix.  blood sugar monitoring.  Purewick in place.  labs ordered this am.  to return to [facility] when MD is ready for discharge

## 2024-08-31 NOTE — PLAN OF CARE
Goal Outcome Evaluation:  Plan of Care Reviewed With: patient        Progress: improving  Outcome Evaluation: VSS. bg elevated but corrected with ssi and sched meal insulin. pt has good appetite. BLE dsgs changed this AM. open area to R posterior calf, but all other spots closed, healing. abx ointment applied, vaseline gauze and kerlex wrap. up in chair with asst x1. purewick in place due to diuretics, frequency and strict I&O. skin intact, but kinsey heels pink and blanchable. Norco given for leg and heel pain.

## 2024-08-31 NOTE — PROGRESS NOTES
[facility] Progress Note      Patient Name: Halie Guidry  : 1940  MRN: [id number]  Primary Care Physician:  Napoleon Mead MD  Date of admission: 2024    Subjective     Interval History:   F/U CKD 4    No new complaints today.  Denies any fever or chills.  Urine output of 1950 cc last 24 hours    Review of Systems:   As noted above    Objective     Vitals:   Temp:  [97 °F (36.1 °C)-98.4 °F (36.9 °C)] 97 °F (36.1 °C)  Heart Rate:  [60-66] 66  Resp:  [15-17] 16  BP: (128-168)/(59-70) 158/69    Intake/Output Summary (Last 24 hours) at 2024 0923  Last data filed at 2024 0910  Gross per 24 hour   Intake 430 ml   Output 2350 ml   Net -1920 ml       Physical Exam:    General Appearance: alert, oriented x 3, no acute distress.  Chronically ill.    Skin: warm and dry  HEENT: oral mucosa normal, nonicteric sclera  Neck: supple, no JVD  Lungs: Fine bibasilar crackles.  No wheezing. RA  Heart: RRR, normal S1 and S2  Abdomen: soft, nontender, nondistended. +BS  : no palpable bladder, external urinary catheter  Extremities: 1+ lower extremity edema to knees.    Neuro: normal speech and mental status     Scheduled Meds:     amLODIPine, 2.5 mg, Oral, Daily  atorvastatin, 80 mg, Oral, Nightly  bisoprolol, 5 mg, Oral, Daily  bumetanide, 3 mg, Oral, BID  doxycycline, 100 mg, Oral, Q12H  DULoxetine, 30 mg, Oral, Daily  ferrous sulfate, 325 mg, Oral, Every Other Day  heparin (porcine), 5,000 Units, Subcutaneous, Q12H  hydrALAZINE, 75 mg, Oral, Q8H  hydrocortisone-bacitracin-zinc oxide-nystatin, 1 Application, Topical, BID  insulin glargine, 50 Units, Subcutaneous, Nightly  insulin lispro, 10 Units, Subcutaneous, TID AC  insulin lispro, 3-14 Units, Subcutaneous, 4x Daily AC & at Bedtime  lactobacillus acidophilus, 1 capsule, Oral, Daily  levothyroxine, 137 mcg, Oral, Q AM  melatonin, 2.5 mg, Oral, Nightly  mupirocin, 1 Application, Topical, Daily  pantoprazole, 40 mg, Oral, Q  AM  pregabalin, 50 mg, Oral, BID  sodium chloride, 3 mL, Intravenous, Q12H  spironolactone, 50 mg, Oral, Daily      IV Meds:          Results Reviewed:   I have personally reviewed the results from the time of this admission to 8/31/2024 09:23 EDT     Results from last 7 days   Lab Units 08/31/24 0436 08/30/24 0310 08/29/24  0756 08/27/24  0615 08/26/24  0540   SODIUM mmol/L 136 140 138   < > 134*   POTASSIUM mmol/L 4.0 3.9 3.6   < > 3.6   CHLORIDE mmol/L 102 106 103   < > 100   CO2 mmol/L 19.9* 20.5* 21.7*   < > 21.8*   BUN mg/dL 54* 46* 42*   < > 24*   CREATININE mg/dL 2.51* 2.35* 2.28*   < > 2.27*   CALCIUM mg/dL 8.5* 8.6 8.5*   < > 8.3*   BILIRUBIN mg/dL  --   --   --   --  <0.2   ALK PHOS U/L  --   --   --   --  130*   ALT (SGPT) U/L  --   --   --   --  <5   AST (SGOT) U/L  --   --   --   --  12   GLUCOSE mg/dL 222* 189* 174*   < > 175*    < > = values in this interval not displayed.       Estimated Creatinine Clearance: 20.2 mL/min (A) (by C-G formula based on SCr of 2.51 mg/dL (H)).    Results from last 7 days   Lab Units 08/31/24 0436 08/30/24 0310 08/29/24 0756 08/29/24  0504   MAGNESIUM mg/dL 1.7 1.8  --  1.9   PHOSPHORUS mg/dL 5.5* 5.3* 5.0*  --              Results from last 7 days   Lab Units 08/31/24 0436 08/30/24 0310 08/29/24  0756 08/28/24  0648 08/27/24  0615   WBC 10*3/mm3 12.35* 11.82* 11.24* 11.16* 8.80   HEMOGLOBIN g/dL 10.0* 9.8* 9.9* 10.0* 9.8*   PLATELETS 10*3/mm3 375 365 356 362 337             Assessment / Plan     ASSESSMENT:  CKD 4 baseline creatinine around 2.  Chronic cardiorenal syndrome.  Kidney function currently stable. Electrolytes acceptable.   2.  Heart failure preserved ejection fraction diuresing: oral Bumex restarted 8/29/24 and oral Aldactone.    3.  Right lower extremity cellulitis on Vanco and Kefzol.  4.  Hypertension.  Not optimally controlled  5.  Anemia CKD. Stable.   6.  Diabetes mellitus type II.  SSI per primary.   7.  Hypothyroid inadequately replaced.   Likely adding to her heart failure decompensation.  Supplement increased this admission.  8.  Nephrotic range proteinuria.  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  Increase hydralazine to 100 mg 3 times daily  Serology still pending  Given leukocytosis will decrease dose of Bumex to 2 mg twice daily  We will start doxazosin 1 mg p.o. daily  Labs in a.m.      Thank you for involving us in the care of Halie Guidry.  Please feel free to call with any questions.    Brendon Aguilar MD   08/31/24  09:23 EDT    [facility]  [phone]    Please note that portions of this note were completed with a voice recognition program.

## 2024-08-31 NOTE — PROGRESS NOTES
Name: Halie Guidry ADMIT: 2024   : 1940  PCP: Napoleon Mead MD    MRN: [id number] LOS: 6 days   AGE/SEX: 84 y.o. female  ROOM: [facility]     Subjective   Subjective   No acute events overnight.  Continues to complain of pain.  Denies fevers or chills.  WBC gradually trending up.  PT recommends rehab, however patient refusing reporting that she has physical therapy at her assisted living.    Review of Systems   As above  Objective   Objective   Vital Signs  Temp:  [97 °F (36.1 °C)-98.4 °F (36.9 °C)] 97 °F (36.1 °C)  Heart Rate:  [60-66] 66  Resp:  [15-17] 16  BP: (128-168)/(59-69) 158/69  SpO2:  [93 %-96 %] 93 %  on   ;   Device (Oxygen Therapy): room air  Body mass index is 32.11 kg/m².  Physical Exam    General: Alert sitting up in the bed, not in distress,  HEENT: Normocephalic, atraumatic  CV: Regular rate and rhythm, no murmurs rubs or gallops  Lungs: Clear to auscultation bilaterally, no crackles or wheezes  Abdomen: Soft, nontender, nondistended  Extremities: Bilateral lower extremities 1+ edema, bilateral dressing was unwrapped while was in the room.  Multiple wounds in bilateral lower extremities, healing.  Patient reports wound significantly improved compared to prior.    Results Review     I reviewed the patient's new clinical results.  Results from last 7 days   Lab Units 24  0436 24  0648   WBC 10*3/mm3 12.35* 11.82* 11.24* 11.16*   HEMOGLOBIN g/dL 10.0* 9.8* 9.9* 10.0*   PLATELETS 10*3/mm3 375 365 356 362     Results from last 7 days   Lab Units 24  0436 24  0648   SODIUM mmol/L 136 140 138 139   POTASSIUM mmol/L 4.0 3.9 3.6 4.4   CHLORIDE mmol/L 102 106 103 102   CO2 mmol/L 19.9* 20.5* 21.7* 22.8   BUN mg/dL 54* 46* 42* 36*   CREATININE mg/dL 2.51* 2.35* 2.28* 2.25*   GLUCOSE mg/dL 222* 189* 174* 82   Estimated Creatinine Clearance: 20.2 mL/min (A) (by C-G formula based on SCr of 2.51 mg/dL  (H)).  Results from last 7 days   Lab Units 08/31/24  0436 08/30/24 0310 08/29/24  0756 08/28/24  0648 08/26/24  1137 08/26/24  0540   ALBUMIN g/dL 3.2* 3.1* 3.2* 3.1*   < > 3.2*   BILIRUBIN mg/dL  --   --   --   --   --  <0.2   ALK PHOS U/L  --   --   --   --   --  130*   AST (SGOT) U/L  --   --   --   --   --  12   ALT (SGPT) U/L  --   --   --   --   --  <5    < > = values in this interval not displayed.     Results from last 7 days   Lab Units 08/31/24 0436 08/30/24 0310 08/29/24 0756 08/29/24  0504 08/28/24  0648   CALCIUM mg/dL 8.5* 8.6 8.5*  --  8.5*   ALBUMIN g/dL 3.2* 3.1* 3.2*  --  3.1*   MAGNESIUM mg/dL 1.7 1.8  --  1.9 2.0   PHOSPHORUS mg/dL 5.5* 5.3* 5.0*  --  5.2*     Results from last 7 days   Lab Units 08/24/24 2002   LACTATE mmol/L 1.2     COVID19   Date Value Ref Range Status   01/27/2024 Not Detected Not Detected - Ref. Range Final   01/09/2024 Detected (C) Not Detected - Ref. Range Final     Glucose   Date/Time Value Ref Range Status   08/31/2024 1110 325 (H) 70 - 130 mg/dL Final   08/31/2024 0553 245 (H) 70 - 130 mg/dL Final   08/30/2024 2017 183 (H) 70 - 130 mg/dL Final   08/30/2024 1618 74 70 - 130 mg/dL Final   08/30/2024 1136 260 (H) 70 - 130 mg/dL Final   08/30/2024 0635 213 (H) 70 - 130 mg/dL Final   08/29/2024 2135 195 (H) 70 - 130 mg/dL Final           XR Chest PA & Lateral  Narrative: CHEST: 2 VIEWS     HISTORY: Dyspnea     COMPARISON: AP chest 01/27/2024, 01/09/2024, AP chest 05/13/2023.     FINDINGS: Heart size is mildly enlarged. There is increased linear  opacity in the left lung base that is most likely subsegmental  atelectasis but could be related to scarring. There is a calcified  nodule in the right midlung. Lung volumes are diminished. There is no  evidence for pulmonary edema. There is healed or healing right lateral  rib fracture with adjacent pleural thickening and this fracture was  demonstrated on previous PET/CT 06/17/2024.      Impression: Diminished lung volumes  with left basilar subsegmental  atelectasis or scarring.     This report was finalized on 8/26/2024 6:27 PM by Blake Murphy M.D  on Workstation: [id number]     Adult Transthoracic Echo Complete W/ Cont if Necessary Per Protocol    Left ventricular systolic function is normal. Calculated left   ventricular EF = 61.8% Normal left ventricular cavity size noted. Left   ventricular wall thickness is consistent with moderate concentric   hypertrophy. All left ventricular wall segments contract normally. Left   ventricular diastolic function was normal.    The left atrial cavity is moderately dilated.    There is moderate calcification of the aortic valve. The aortic valve   appears trileaflet. Trace aortic valve regurgitation is present. No aortic   valve stenosis is present.    Moderate mitral annular calcification is present. Mild mitral valve   regurgitation is present with multiple jets noted. No significant mitral   valve stenosis is present.    Scheduled Medications  atorvastatin, 80 mg, Oral, Nightly  bisoprolol, 5 mg, Oral, Daily  bumetanide, 2 mg, Oral, BID  ceFAZolin, 2,000 mg, Intravenous, Q12H  doxycycline, 100 mg, Oral, Q12H  DULoxetine, 30 mg, Oral, Daily  ferrous sulfate, 325 mg, Oral, Every Other Day  heparin (porcine), 5,000 Units, Subcutaneous, Q12H  hydrALAZINE, 100 mg, Oral, Q8H  hydrocortisone-bacitracin-zinc oxide-nystatin, 1 Application, Topical, BID  insulin glargine, 50 Units, Subcutaneous, Nightly  insulin lispro, 10 Units, Subcutaneous, TID AC  insulin lispro, 3-14 Units, Subcutaneous, 4x Daily AC & at Bedtime  lactobacillus acidophilus, 1 capsule, Oral, Daily  levothyroxine, 137 mcg, Oral, Q AM  melatonin, 2.5 mg, Oral, Nightly  mupirocin, 1 Application, Topical, Daily  pantoprazole, 40 mg, Oral, Q AM  pregabalin, 50 mg, Oral, BID  sodium chloride, 3 mL, Intravenous, Q12H  spironolactone, 50 mg, Oral, Daily  terazosin, 1 mg, Oral, Nightly    Infusions   Diet  Diet: Diabetic, Cardiac;  Healthy Heart (2-3 Na+); Consistent Carbohydrate; Fluid Consistency: Thin (IDDSI 0)    I have personally reviewed     [x]  Laboratory   []  Microbiology   []  Radiology   []  EKG/Telemetry  []  Cardiology/Vascular   []  Pathology    []  Records       Assessment/Plan     Active Hospital Problems    Diagnosis  POA    **Cellulitis of right lower extremity [L03.115]  Yes    Bilateral cellulitis of lower leg [L03.116, L03.115]  Yes    Bilateral lower extremity edema [R60.0]  Yes    CKD (chronic kidney disease) stage 4, GFR 15-29 ml/min [N18.4]  Yes    Acute CHF [I50.9]  Yes    Anemia due to chronic kidney disease [N18.9, D63.1]  Yes    Primary malignant neuroendocrine tumor of pancreas [C7A.8]  Yes    Peripheral edema [R60.0]  Yes    Accelerated hypertension [I10]  Yes    Generalized weakness [R53.1]  Yes    Hyperlipidemia [E78.5]  Yes    Type 2 diabetes mellitus with hyperglycemia, with long-term current use of insulin [E11.65, Z79.4]  Not Applicable    Neuropathy [G62.9]  Yes    Benign essential hypertension [I10]  Yes      Resolved Hospital Problems   No resolved problems to display.       84-year-old female presents the hospital with concern for leg cellulitis right greater than left with multiple leg wounds.     Cellulitis of the leg right greater than left with bilateral leg wounds:  -Initially patient had some wound drainage concerning for impetigo no no more drainage noted  -Wound care  -swelling improving  -Patient continues to have bilateral lower EXTR however I suspect primarily due to chronic swelling/stasis changes, however WBC trending up for the last few days, will reinitiate on IV cefazolin.     Acute CHF, anasarca:  -Echocardiogram 08/26/2024 EF of 61.8%,  -Nephrology managing diuresis,  on po Bumex     CKD 4:  -Creatinine appears to be slightly above baseline  -Protein to creatinine ratio 7.974, nephrotic range, likely secondary to diabetes  -Creatinine trending up today, nephrology managing workup  pending  -     Anemia  -Chart review iron panel last year was consistent with iron deficient anemia  -Repeat iron panel 08/26/2024 mixed, consistent with anemia of chronic disease and iron deficiency anemia  -Initiated on p.o. iron every other day  -Hemoglobin stable     Type 2 diabetes:   -A1c 7.9.  Monitor blood sugar and adjust insulin as needed.    -Blood glucose stable on current regimen  -I evening Lantus  decreased on 08/320/2024 to 30 units as patient blood glucose dropped to 74, blood glucose increasing back up today, increase Lantus to 15 units       Essential hypertension:   Acutely stable on current regimen       Neuropathy:   Decrease Lyrica dose due to CKD.  Continue low-dose chronic hydrocodone.     Neuroendocrine tumor:   management currently with oncology.     Hyperlipidemia: Continue statin.  Stable.     Hypokalemia  -WNL     Hypothyroidism  -TSH elevated at 21, free T4 and free T3 low 08/27/2024  -Increase levothyroxine to 137 mcg daily from 112 mcg daily  -Will need repeat labs in 4 weeks      Heparin SC for DVT prophylaxis.  Full code.  Discussed with patient.  Return to [facility], [facility] tomorrow  Expected Discharge Date: 9/1/2024; Expected Discharge Time:        Copied text in this note has been reviewed and is accurate as of 08/31/24.         Dictated utilizing Dragon dictation        Balbina Painting MD  [facility]  08/31/24  15:07 EDT

## 2024-09-01 LAB
ALBUMIN SERPL-MCNC: 3.3 G/DL (ref 3.5–5.2)
ANION GAP SERPL CALCULATED.3IONS-SCNC: 14 MMOL/L (ref 5–15)
BUN SERPL-MCNC: 63 MG/DL (ref 8–23)
BUN/CREAT SERPL: 25.5 (ref 7–25)
CALCIUM SPEC-SCNC: 8.4 MG/DL (ref 8.6–10.5)
CHLORIDE SERPL-SCNC: 101 MMOL/L (ref 98–107)
CO2 SERPL-SCNC: 21 MMOL/L (ref 22–29)
CREAT SERPL-MCNC: 2.47 MG/DL (ref 0.57–1)
DEPRECATED RDW RBC AUTO: 47.1 FL (ref 37–54)
EGFRCR SERPLBLD CKD-EPI 2021: 18.8 ML/MIN/1.73
ERYTHROCYTE [DISTWIDTH] IN BLOOD BY AUTOMATED COUNT: 15.2 % (ref 12.3–15.4)
GLUCOSE BLDC GLUCOMTR-MCNC: 246 MG/DL (ref 70–130)
GLUCOSE BLDC GLUCOMTR-MCNC: 256 MG/DL (ref 70–130)
GLUCOSE BLDC GLUCOMTR-MCNC: 275 MG/DL (ref 70–130)
GLUCOSE BLDC GLUCOMTR-MCNC: 305 MG/DL (ref 70–130)
GLUCOSE SERPL-MCNC: 259 MG/DL (ref 65–99)
HCT VFR BLD AUTO: 28.5 % (ref 34–46.6)
HGB BLD-MCNC: 9.2 G/DL (ref 12–15.9)
MAGNESIUM SERPL-MCNC: 2.2 MG/DL (ref 1.6–2.4)
MCH RBC QN AUTO: 27.5 PG (ref 26.6–33)
MCHC RBC AUTO-ENTMCNC: 32.3 G/DL (ref 31.5–35.7)
MCV RBC AUTO: 85.1 FL (ref 79–97)
PHOSPHATE SERPL-MCNC: 5.4 MG/DL (ref 2.5–4.5)
PLATELET # BLD AUTO: 343 10*3/MM3 (ref 140–450)
PMV BLD AUTO: 9.5 FL (ref 6–12)
POTASSIUM SERPL-SCNC: 4.3 MMOL/L (ref 3.5–5.2)
RBC # BLD AUTO: 3.35 10*6/MM3 (ref 3.77–5.28)
SODIUM SERPL-SCNC: 136 MMOL/L (ref 136–145)
WBC NRBC COR # BLD AUTO: 12.51 10*3/MM3 (ref 3.4–10.8)

## 2024-09-01 PROCEDURE — 83735 ASSAY OF MAGNESIUM: CPT | Performed by: STUDENT IN AN ORGANIZED HEALTH CARE EDUCATION/TRAINING PROGRAM

## 2024-09-01 PROCEDURE — 25010000002 CEFAZOLIN PER 500 MG: Performed by: STUDENT IN AN ORGANIZED HEALTH CARE EDUCATION/TRAINING PROGRAM

## 2024-09-01 PROCEDURE — 63710000001 INSULIN LISPRO (HUMAN) PER 5 UNITS: Performed by: INTERNAL MEDICINE

## 2024-09-01 PROCEDURE — 80069 RENAL FUNCTION PANEL: CPT | Performed by: INTERNAL MEDICINE

## 2024-09-01 PROCEDURE — 82948 REAGENT STRIP/BLOOD GLUCOSE: CPT

## 2024-09-01 PROCEDURE — 25010000002 HEPARIN (PORCINE) PER 1000 UNITS: Performed by: INTERNAL MEDICINE

## 2024-09-01 PROCEDURE — 85027 COMPLETE CBC AUTOMATED: CPT | Performed by: STUDENT IN AN ORGANIZED HEALTH CARE EDUCATION/TRAINING PROGRAM

## 2024-09-01 PROCEDURE — 63710000001 INSULIN GLARGINE PER 5 UNITS: Performed by: STUDENT IN AN ORGANIZED HEALTH CARE EDUCATION/TRAINING PROGRAM

## 2024-09-01 RX ORDER — PREGABALIN 25 MG/1
25 CAPSULE ORAL ONCE
Status: COMPLETED | OUTPATIENT
Start: 2024-09-01 | End: 2024-09-01

## 2024-09-01 RX ORDER — HYDROCODONE BITARTRATE AND ACETAMINOPHEN 5; 325 MG/1; MG/1
1 TABLET ORAL EVERY 8 HOURS PRN
Status: DISCONTINUED | OUTPATIENT
Start: 2024-09-01 | End: 2024-09-04 | Stop reason: HOSPADM

## 2024-09-01 RX ORDER — PREGABALIN 75 MG/1
75 CAPSULE ORAL 2 TIMES DAILY
Status: DISCONTINUED | OUTPATIENT
Start: 2024-09-01 | End: 2024-09-04 | Stop reason: HOSPADM

## 2024-09-01 RX ADMIN — INSULIN LISPRO 10 UNITS: 100 INJECTION, SOLUTION INTRAVENOUS; SUBCUTANEOUS at 17:41

## 2024-09-01 RX ADMIN — ACETAMINOPHEN 325MG 650 MG: 325 TABLET ORAL at 14:29

## 2024-09-01 RX ADMIN — INSULIN GLARGINE 60 UNITS: 100 INJECTION, SOLUTION SUBCUTANEOUS at 21:20

## 2024-09-01 RX ADMIN — INSULIN LISPRO 5 UNITS: 100 INJECTION, SOLUTION INTRAVENOUS; SUBCUTANEOUS at 21:20

## 2024-09-01 RX ADMIN — LEVOTHYROXINE SODIUM 137 MCG: 137 TABLET ORAL at 05:37

## 2024-09-01 RX ADMIN — HYDROCODONE BITARTRATE AND ACETAMINOPHEN 1 TABLET: 5; 325 TABLET ORAL at 17:43

## 2024-09-01 RX ADMIN — HEPARIN SODIUM 5000 UNITS: 5000 INJECTION INTRAVENOUS; SUBCUTANEOUS at 08:25

## 2024-09-01 RX ADMIN — INSULIN LISPRO 10 UNITS: 100 INJECTION, SOLUTION INTRAVENOUS; SUBCUTANEOUS at 11:50

## 2024-09-01 RX ADMIN — Medication 2.5 MG: at 21:19

## 2024-09-01 RX ADMIN — SODIUM CHLORIDE 2000 MG: 900 INJECTION INTRAVENOUS at 11:50

## 2024-09-01 RX ADMIN — PREGABALIN 25 MG: 25 CAPSULE ORAL at 11:58

## 2024-09-01 RX ADMIN — Medication 1 APPLICATION: at 21:19

## 2024-09-01 RX ADMIN — Medication 1 CAPSULE: at 08:27

## 2024-09-01 RX ADMIN — HYDRALAZINE HYDROCHLORIDE 100 MG: 50 TABLET ORAL at 14:27

## 2024-09-01 RX ADMIN — DULOXETINE HYDROCHLORIDE 30 MG: 30 CAPSULE, DELAYED RELEASE ORAL at 08:27

## 2024-09-01 RX ADMIN — MUPIROCIN 1 APPLICATION: 20 OINTMENT TOPICAL at 10:32

## 2024-09-01 RX ADMIN — HYDRALAZINE HYDROCHLORIDE 100 MG: 50 TABLET ORAL at 05:37

## 2024-09-01 RX ADMIN — Medication 1 APPLICATION: at 10:32

## 2024-09-01 RX ADMIN — Medication 3 ML: at 08:28

## 2024-09-01 RX ADMIN — INSULIN LISPRO 8 UNITS: 100 INJECTION, SOLUTION INTRAVENOUS; SUBCUTANEOUS at 17:41

## 2024-09-01 RX ADMIN — BUMETANIDE 2 MG: 2 TABLET ORAL at 08:27

## 2024-09-01 RX ADMIN — INSULIN LISPRO 8 UNITS: 100 INJECTION, SOLUTION INTRAVENOUS; SUBCUTANEOUS at 08:25

## 2024-09-01 RX ADMIN — HYDROCODONE BITARTRATE AND ACETAMINOPHEN 1 TABLET: 5; 325 TABLET ORAL at 08:25

## 2024-09-01 RX ADMIN — PANTOPRAZOLE SODIUM 40 MG: 40 TABLET, DELAYED RELEASE ORAL at 05:37

## 2024-09-01 RX ADMIN — BUMETANIDE 2 MG: 2 TABLET ORAL at 17:41

## 2024-09-01 RX ADMIN — PREGABALIN 75 MG: 75 CAPSULE ORAL at 21:20

## 2024-09-01 RX ADMIN — Medication 3 ML: at 21:20

## 2024-09-01 RX ADMIN — ACETAMINOPHEN 325MG 650 MG: 325 TABLET ORAL at 03:47

## 2024-09-01 RX ADMIN — ATORVASTATIN CALCIUM 80 MG: 80 TABLET, FILM COATED ORAL at 21:19

## 2024-09-01 RX ADMIN — PREGABALIN 50 MG: 50 CAPSULE ORAL at 08:25

## 2024-09-01 RX ADMIN — INSULIN LISPRO 10 UNITS: 100 INJECTION, SOLUTION INTRAVENOUS; SUBCUTANEOUS at 08:25

## 2024-09-01 NOTE — PROGRESS NOTES
[facility] Progress Note      Patient Name: Halie Guidry  : 1940  MRN: [id number]  Primary Care Physician:  Napoleon Mead MD  Date of admission: 2024    Subjective     Interval History:   F/U CKD 4    Complaining of bilateral feet pain.  No operative 2000 hours.  Vitals:   Temp:  [96.7 °F (35.9 °C)-97.5 °F (36.4 °C)] 96.9 °F (36.1 °C)  Heart Rate:  [59-68] 68  Resp:  [16-18] 18  BP: (129-170)/(57-68) 129/57    Intake/Output Summary (Last 24 hours) at 2024 1019  Last data filed at 2024 0914  Gross per 24 hour   Intake 310 ml   Output 2050 ml   Net -1740 ml       Physical Exam:    General Appearance: alert, oriented x 3, no acute distress.  Chronically ill.    Skin: warm and dry  HEENT: oral mucosa normal, nonicteric sclera  Neck: supple, no JVD  Lungs: Fine bibasilar crackles.  No wheezing. RA  Heart: RRR, normal S1 and S2  Abdomen: soft, nontender, nondistended. +BS  : no palpable bladder, external urinary catheter  Extremities: 1+ lower extremity edema to knees.    Neuro: normal speech and mental status     Scheduled Meds:     atorvastatin, 80 mg, Oral, Nightly  bisoprolol, 5 mg, Oral, Daily  bumetanide, 2 mg, Oral, BID  ceFAZolin, 2,000 mg, Intravenous, Q12H  DULoxetine, 30 mg, Oral, Daily  ferrous sulfate, 325 mg, Oral, Every Other Day  heparin (porcine), 5,000 Units, Subcutaneous, Q12H  hydrALAZINE, 100 mg, Oral, Q8H  hydrocortisone-bacitracin-zinc oxide-nystatin, 1 Application, Topical, BID  insulin glargine, 50 Units, Subcutaneous, Nightly  insulin lispro, 10 Units, Subcutaneous, TID AC  insulin lispro, 3-14 Units, Subcutaneous, 4x Daily AC & at Bedtime  lactobacillus acidophilus, 1 capsule, Oral, Daily  levothyroxine, 137 mcg, Oral, Q AM  melatonin, 2.5 mg, Oral, Nightly  mupirocin, 1 Application, Topical, Daily  pantoprazole, 40 mg, Oral, Q AM  pregabalin, 50 mg, Oral, BID  sodium chloride, 3 mL, Intravenous, Q12H  spironolactone, 50 mg,  Oral, Daily  terazosin, 1 mg, Oral, Nightly      IV Meds:          Results Reviewed:   I have personally reviewed the results from the time of this admission to 9/1/2024 10:19 EDT     Results from last 7 days   Lab Units 09/01/24 0533 08/31/24 0436 08/30/24 0310 08/27/24  0615 08/26/24  0540   SODIUM mmol/L 136 136 140   < > 134*   POTASSIUM mmol/L 4.3 4.0 3.9   < > 3.6   CHLORIDE mmol/L 101 102 106   < > 100   CO2 mmol/L 21.0* 19.9* 20.5*   < > 21.8*   BUN mg/dL 63* 54* 46*   < > 24*   CREATININE mg/dL 2.47* 2.51* 2.35*   < > 2.27*   CALCIUM mg/dL 8.4* 8.5* 8.6   < > 8.3*   BILIRUBIN mg/dL  --   --   --   --  <0.2   ALK PHOS U/L  --   --   --   --  130*   ALT (SGPT) U/L  --   --   --   --  <5   AST (SGOT) U/L  --   --   --   --  12   GLUCOSE mg/dL 259* 222* 189*   < > 175*    < > = values in this interval not displayed.       Estimated Creatinine Clearance: 20.2 mL/min (A) (by C-G formula based on SCr of 2.47 mg/dL (H)).    Results from last 7 days   Lab Units 09/01/24 0533 08/31/24 0436 08/30/24  0310   MAGNESIUM mg/dL 2.2 1.7 1.8   PHOSPHORUS mg/dL 5.4* 5.5* 5.3*             Results from last 7 days   Lab Units 09/01/24 0533 08/31/24 0436 08/30/24  0310 08/29/24  0756 08/28/24  0648   WBC 10*3/mm3 12.51* 12.35* 11.82* 11.24* 11.16*   HEMOGLOBIN g/dL 9.2* 10.0* 9.8* 9.9* 10.0*   PLATELETS 10*3/mm3 343 375 365 356 362             Assessment / Plan     ASSESSMENT:  CKD 4 baseline creatinine around 2.  Chronic cardiorenal syndrome.  Kidney function currently stable. Electrolytes acceptable.   2.  Heart failure preserved ejection fraction diuresing: oral Bumex restarted 8/29/24 and oral Aldactone.    3.  Right lower extremity cellulitis on Vanco and Kefzol.  4.  Hypertension.  Not optimally controlled  5.  Anemia CKD. Stable.   6.  Diabetes mellitus type II.  SSI per primary.   7.  Hypothyroid inadequately replaced.  Likely adding to her heart failure decompensation.  Supplement increased this admission.  8.   Nephrotic range proteinuria.  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  Continue current diuresis labs  Labs in Am   serology still pending    Thank you for involving us in the care of Halie LIMA Lorrainemary.  Please feel free to call with any questions.    Brendon Aguilar MD   09/01/24  10:19 EDT    [facility]  [phone]    Please note that portions of this note were completed with a voice recognition program.

## 2024-09-01 NOTE — PLAN OF CARE
Goal Outcome Evaluation:  Plan of Care Reviewed With: patient        Progress: improving  Outcome Evaluation: Pt up in chair and to bsc today. purewick in place due to skin integrity and freq incontinence of urine from diuretics. perineum and kinsey post thighs deep red but blanchable and intact. enc freq repositioning. weak and fatigues easily when up. suzanne diet. accuchecks elevated, dw pt prioritizing protein and decreasing carbohydrates shawn at breakfast. pt thinking she needs rehab instead of returning to AL facility due to needing strength and lack of asst available at her facility. re-consult to CCP.

## 2024-09-01 NOTE — PROGRESS NOTES
Name: Halie Guidry ADMIT: 2024   : 1940  PCP: Napoleon Mead MD    MRN: [id number] LOS: 7 days   AGE/SEX: 84 y.o. female  ROOM: [facility]     Subjective   Subjective   No acute events overnight.  One of her wounds bled with patient,  dressing changed.  Continues to have significant bilateral lower extremity pain.  Now considering rehab.  Review of Systems   As above  Objective   Objective   Vital Signs  Temp:  [96.7 °F (35.9 °C)-97.5 °F (36.4 °C)] 96.9 °F (36.1 °C)  Heart Rate:  [59-73] 73  Resp:  [16-18] 18  BP: (129-170)/(57-68) 145/64  SpO2:  [92 %-97 %] 97 %  on   ;   Device (Oxygen Therapy): room air  Body mass index is 30.97 kg/m².  Physical Exam    General: Alert, laying in bed, not in distress,  HEENT: Normocephalic, atraumatic  CV: Regular rate and rhythm, no murmurs rubs or gallops  Lungs: Clear to auscultation bilaterally, no crackles or wheezes  Abdomen: Soft, nontender, nondistended  Extremities: Bilateral lower extremities 1+ edema, dressing in place, erythema unchanged, improved compared to admission.      Results Review     I reviewed the patient's new clinical results.  Results from last 7 days   Lab Units 24  0436 24  0756   WBC 10*3/mm3 12.51* 12.35* 11.82* 11.24*   HEMOGLOBIN g/dL 9.2* 10.0* 9.8* 9.9*   PLATELETS 10*3/mm3 343 375 365 356     Results from last 7 days   Lab Units 24  0533 24  0436 24  03124  0756   SODIUM mmol/L 136 136 140 138   POTASSIUM mmol/L 4.3 4.0 3.9 3.6   CHLORIDE mmol/L 101 102 106 103   CO2 mmol/L 21.0* 19.9* 20.5* 21.7*   BUN mg/dL 63* 54* 46* 42*   CREATININE mg/dL 2.47* 2.51* 2.35* 2.28*   GLUCOSE mg/dL 259* 222* 189* 174*   Estimated Creatinine Clearance: 20.2 mL/min (A) (by C-G formula based on SCr of 2.47 mg/dL (H)).  Results from last 7 days   Lab Units 24  0533 24  0436 24  0310 24  0756 24  1137 24  0540   ALBUMIN g/dL 3.3* 3.2* 3.1* 3.2*    < > 3.2*   BILIRUBIN mg/dL  --   --   --   --   --  <0.2   ALK PHOS U/L  --   --   --   --   --  130*   AST (SGOT) U/L  --   --   --   --   --  12   ALT (SGPT) U/L  --   --   --   --   --  <5    < > = values in this interval not displayed.     Results from last 7 days   Lab Units 09/01/24  0533 08/31/24  0436 08/30/24  0310 08/29/24  0756 08/29/24  0504   CALCIUM mg/dL 8.4* 8.5* 8.6 8.5*  --    ALBUMIN g/dL 3.3* 3.2* 3.1* 3.2*  --    MAGNESIUM mg/dL 2.2 1.7 1.8  --  1.9   PHOSPHORUS mg/dL 5.4* 5.5* 5.3* 5.0*  --            COVID19   Date Value Ref Range Status   01/27/2024 Not Detected Not Detected - Ref. Range Final   01/09/2024 Detected (C) Not Detected - Ref. Range Final     Glucose   Date/Time Value Ref Range Status   09/01/2024 1124 305 (H) 70 - 130 mg/dL Final   09/01/2024 0552 275 (H) 70 - 130 mg/dL Final   08/31/2024 2046 207 (H) 70 - 130 mg/dL Final   08/31/2024 1633 166 (H) 70 - 130 mg/dL Final   08/31/2024 1110 325 (H) 70 - 130 mg/dL Final   08/31/2024 0553 245 (H) 70 - 130 mg/dL Final   08/30/2024 2017 183 (H) 70 - 130 mg/dL Final           XR Chest PA & Lateral  Narrative: CHEST: 2 VIEWS     HISTORY: Dyspnea     COMPARISON: AP chest 01/27/2024, 01/09/2024, AP chest 05/13/2023.     FINDINGS: Heart size is mildly enlarged. There is increased linear  opacity in the left lung base that is most likely subsegmental  atelectasis but could be related to scarring. There is a calcified  nodule in the right midlung. Lung volumes are diminished. There is no  evidence for pulmonary edema. There is healed or healing right lateral  rib fracture with adjacent pleural thickening and this fracture was  demonstrated on previous PET/CT 06/17/2024.      Impression: Diminished lung volumes with left basilar subsegmental  atelectasis or scarring.     This report was finalized on 8/26/2024 6:27 PM by Blake Murphy M.D  on Workstation: [id number]     Adult Transthoracic Echo Complete W/ Cont if Necessary Per Protocol     Left ventricular systolic function is normal. Calculated left   ventricular EF = 61.8% Normal left ventricular cavity size noted. Left   ventricular wall thickness is consistent with moderate concentric   hypertrophy. All left ventricular wall segments contract normally. Left   ventricular diastolic function was normal.    The left atrial cavity is moderately dilated.    There is moderate calcification of the aortic valve. The aortic valve   appears trileaflet. Trace aortic valve regurgitation is present. No aortic   valve stenosis is present.    Moderate mitral annular calcification is present. Mild mitral valve   regurgitation is present with multiple jets noted. No significant mitral   valve stenosis is present.    Scheduled Medications  atorvastatin, 80 mg, Oral, Nightly  bisoprolol, 5 mg, Oral, Daily  bumetanide, 2 mg, Oral, BID  ceFAZolin, 2,000 mg, Intravenous, Q12H  DULoxetine, 30 mg, Oral, Daily  ferrous sulfate, 325 mg, Oral, Every Other Day  heparin (porcine), 5,000 Units, Subcutaneous, Q12H  hydrALAZINE, 100 mg, Oral, Q8H  hydrocortisone-bacitracin-zinc oxide-nystatin, 1 Application, Topical, BID  insulin glargine, 60 Units, Subcutaneous, Nightly  insulin lispro, 10 Units, Subcutaneous, TID AC  insulin lispro, 3-14 Units, Subcutaneous, 4x Daily AC & at Bedtime  lactobacillus acidophilus, 1 capsule, Oral, Daily  levothyroxine, 137 mcg, Oral, Q AM  melatonin, 2.5 mg, Oral, Nightly  mupirocin, 1 Application, Topical, Daily  pantoprazole, 40 mg, Oral, Q AM  pregabalin, 75 mg, Oral, BID  sodium chloride, 3 mL, Intravenous, Q12H  spironolactone, 50 mg, Oral, Daily  terazosin, 1 mg, Oral, Nightly    Infusions   Diet  Diet: Diabetic, Cardiac; Healthy Heart (2-3 Na+); Consistent Carbohydrate; Fluid Consistency: Thin (IDDSI 0)    I have personally reviewed     [x]  Laboratory   []  Microbiology   []  Radiology   []  EKG/Telemetry  []  Cardiology/Vascular   []  Pathology    []  Records       Assessment/Plan      Active Hospital Problems    Diagnosis  POA    **Cellulitis of right lower extremity [L03.115]  Yes    Bilateral cellulitis of lower leg [L03.116, L03.115]  Yes    Bilateral lower extremity edema [R60.0]  Yes    CKD (chronic kidney disease) stage 4, GFR 15-29 ml/min [N18.4]  Yes    Acute CHF [I50.9]  Yes    Anemia due to chronic kidney disease [N18.9, D63.1]  Yes    Primary malignant neuroendocrine tumor of pancreas [C7A.8]  Yes    Peripheral edema [R60.0]  Yes    Accelerated hypertension [I10]  Yes    Generalized weakness [R53.1]  Yes    Hyperlipidemia [E78.5]  Yes    Type 2 diabetes mellitus with hyperglycemia, with long-term current use of insulin [E11.65, Z79.4]  Not Applicable    Neuropathy [G62.9]  Yes    Benign essential hypertension [I10]  Yes      Resolved Hospital Problems   No resolved problems to display.       84-year-old female presents the hospital with concern for leg cellulitis right greater than left with multiple leg wounds.     Cellulitis of the leg right greater than left with bilateral leg wounds:  -Initially patient had some wound drainage concerning for impetigo no no more drainage noted  -Wound care  -swelling improved   -IV cefazolin resumed 08/31   -     Acute CHF, anasarca:  -Echocardiogram 08/26/2024 EF of 61.8%,  -Nephrology managing diuresis,  on po Bumex     CKD 4:  -Creatinine appears to be slightly above baseline  -Protein to creatinine ratio 7.974, nephrotic range, likely secondary to diabetes  -Creatinine trending up today, nephrology managing workup pending  -     Anemia  -Chart review iron panel last year was consistent with iron deficient anemia  -Repeat iron panel 08/26/2024 mixed, consistent with anemia of chronic disease and iron deficiency anemia  -Initiated on p.o. iron every other day  -Hemoglobin hemoglobin fluctuating but stable.  Monitor.     Type 2 diabetes:   -A1c 7.9.  Monitor blood sugar and adjust insulin as needed.    -Blood glucose not trending back up,  increase Lantus to 60 units nightly, continue scheduled and SSI insulin.       Essential hypertension:   Acutely stable on current regimen       Neuropathy:  Lyrica was decreased due to CKD on admission, however patient complains of worsening lower extremity pain, burning.  Increase Lyrica back to home dose 75 mL twice daily     Neuroendocrine tumor:   management currently with oncology.     Hyperlipidemia: Continue statin.  Stable.     Hypokalemia  -WNL     Hypothyroidism  -TSH elevated at 21, free T4 and free T3 low 08/27/2024  -Increase levothyroxine to 137 mcg daily from 112 mcg daily  -Will need repeat labs in 4 weeks      SCDs DVT prophylaxis.  Hold heparin due to bleeding wounds.  Full code.  Discussed with patient.  Disposition, SNF oscillates, medical stable to be discharged  Expected Discharge Date: 9/1/2024; Expected Discharge Time:        Copied text in this note has been reviewed and is accurate as of 09/01/24.         Dictated utilizing Dragon dictation        Balbina Painting MD  [facility]  09/01/24  13:21 EDT

## 2024-09-01 NOTE — PLAN OF CARE
Goal Outcome Evaluation:  Plan of Care Reviewed With: patient        Progress: improving  Outcome Evaluation: VSS. tylenol given for pain.  had a small amount of bleeding in her RLE last ngiht.  reinforced dressing.  blood sugar monitoring. Insulin given. Purewick in place.  Falls precaution maintained.  waffle boots in place

## 2024-09-02 LAB
ALBUMIN SERPL-MCNC: 3.3 G/DL (ref 3.5–5.2)
ANION GAP SERPL CALCULATED.3IONS-SCNC: 14.6 MMOL/L (ref 5–15)
BUN SERPL-MCNC: 64 MG/DL (ref 8–23)
BUN/CREAT SERPL: 26.2 (ref 7–25)
CALCIUM SPEC-SCNC: 8.6 MG/DL (ref 8.6–10.5)
CHLORIDE SERPL-SCNC: 99 MMOL/L (ref 98–107)
CO2 SERPL-SCNC: 20.4 MMOL/L (ref 22–29)
CREAT SERPL-MCNC: 2.44 MG/DL (ref 0.57–1)
DEPRECATED RDW RBC AUTO: 48 FL (ref 37–54)
EGFRCR SERPLBLD CKD-EPI 2021: 19.1 ML/MIN/1.73
ERYTHROCYTE [DISTWIDTH] IN BLOOD BY AUTOMATED COUNT: 15.4 % (ref 12.3–15.4)
GLUCOSE BLDC GLUCOMTR-MCNC: 109 MG/DL (ref 70–130)
GLUCOSE BLDC GLUCOMTR-MCNC: 177 MG/DL (ref 70–130)
GLUCOSE BLDC GLUCOMTR-MCNC: 221 MG/DL (ref 70–130)
GLUCOSE BLDC GLUCOMTR-MCNC: 266 MG/DL (ref 70–130)
GLUCOSE SERPL-MCNC: 245 MG/DL (ref 65–99)
HCT VFR BLD AUTO: 29.9 % (ref 34–46.6)
HGB BLD-MCNC: 9.4 G/DL (ref 12–15.9)
MAGNESIUM SERPL-MCNC: 2.1 MG/DL (ref 1.6–2.4)
MCH RBC QN AUTO: 27 PG (ref 26.6–33)
MCHC RBC AUTO-ENTMCNC: 31.4 G/DL (ref 31.5–35.7)
MCV RBC AUTO: 85.9 FL (ref 79–97)
PHOSPHATE SERPL-MCNC: 5.2 MG/DL (ref 2.5–4.5)
PLATELET # BLD AUTO: 376 10*3/MM3 (ref 140–450)
PMV BLD AUTO: 9.8 FL (ref 6–12)
POTASSIUM SERPL-SCNC: 4.5 MMOL/L (ref 3.5–5.2)
RBC # BLD AUTO: 3.48 10*6/MM3 (ref 3.77–5.28)
SODIUM SERPL-SCNC: 134 MMOL/L (ref 136–145)
WBC NRBC COR # BLD AUTO: 12.43 10*3/MM3 (ref 3.4–10.8)

## 2024-09-02 PROCEDURE — 97530 THERAPEUTIC ACTIVITIES: CPT

## 2024-09-02 PROCEDURE — 99222 1ST HOSP IP/OBS MODERATE 55: CPT | Performed by: INTERNAL MEDICINE

## 2024-09-02 PROCEDURE — 63710000001 INSULIN GLARGINE PER 5 UNITS: Performed by: STUDENT IN AN ORGANIZED HEALTH CARE EDUCATION/TRAINING PROGRAM

## 2024-09-02 PROCEDURE — 83735 ASSAY OF MAGNESIUM: CPT | Performed by: STUDENT IN AN ORGANIZED HEALTH CARE EDUCATION/TRAINING PROGRAM

## 2024-09-02 PROCEDURE — 82948 REAGENT STRIP/BLOOD GLUCOSE: CPT

## 2024-09-02 PROCEDURE — 63710000001 INSULIN LISPRO (HUMAN) PER 5 UNITS: Performed by: STUDENT IN AN ORGANIZED HEALTH CARE EDUCATION/TRAINING PROGRAM

## 2024-09-02 PROCEDURE — 63710000001 INSULIN LISPRO (HUMAN) PER 5 UNITS: Performed by: INTERNAL MEDICINE

## 2024-09-02 PROCEDURE — 25010000002 CEFAZOLIN PER 500 MG: Performed by: STUDENT IN AN ORGANIZED HEALTH CARE EDUCATION/TRAINING PROGRAM

## 2024-09-02 PROCEDURE — 80069 RENAL FUNCTION PANEL: CPT | Performed by: INTERNAL MEDICINE

## 2024-09-02 PROCEDURE — 85027 COMPLETE CBC AUTOMATED: CPT | Performed by: STUDENT IN AN ORGANIZED HEALTH CARE EDUCATION/TRAINING PROGRAM

## 2024-09-02 PROCEDURE — 25010000002 FUROSEMIDE PER 20 MG: Performed by: INTERNAL MEDICINE

## 2024-09-02 RX ORDER — FUROSEMIDE 10 MG/ML
80 INJECTION INTRAMUSCULAR; INTRAVENOUS ONCE
Status: COMPLETED | OUTPATIENT
Start: 2024-09-02 | End: 2024-09-02

## 2024-09-02 RX ORDER — INSULIN LISPRO 100 [IU]/ML
12 INJECTION, SOLUTION INTRAVENOUS; SUBCUTANEOUS
Status: DISCONTINUED | OUTPATIENT
Start: 2024-09-02 | End: 2024-09-04 | Stop reason: HOSPADM

## 2024-09-02 RX ADMIN — PANTOPRAZOLE SODIUM 40 MG: 40 TABLET, DELAYED RELEASE ORAL at 06:04

## 2024-09-02 RX ADMIN — Medication 1 APPLICATION: at 21:08

## 2024-09-02 RX ADMIN — INSULIN LISPRO 12 UNITS: 100 INJECTION, SOLUTION INTRAVENOUS; SUBCUTANEOUS at 08:52

## 2024-09-02 RX ADMIN — HYDRALAZINE HYDROCHLORIDE 100 MG: 50 TABLET ORAL at 06:04

## 2024-09-02 RX ADMIN — ACETAMINOPHEN 325MG 650 MG: 325 TABLET ORAL at 22:20

## 2024-09-02 RX ADMIN — INSULIN LISPRO 12 UNITS: 100 INJECTION, SOLUTION INTRAVENOUS; SUBCUTANEOUS at 18:41

## 2024-09-02 RX ADMIN — SPIRONOLACTONE 50 MG: 50 TABLET ORAL at 08:53

## 2024-09-02 RX ADMIN — Medication 1 CAPSULE: at 08:53

## 2024-09-02 RX ADMIN — Medication 1 APPLICATION: at 09:00

## 2024-09-02 RX ADMIN — Medication 2.5 MG: at 21:07

## 2024-09-02 RX ADMIN — INSULIN LISPRO 3 UNITS: 100 INJECTION, SOLUTION INTRAVENOUS; SUBCUTANEOUS at 21:07

## 2024-09-02 RX ADMIN — HYDROCODONE BITARTRATE AND ACETAMINOPHEN 1 TABLET: 5; 325 TABLET ORAL at 07:33

## 2024-09-02 RX ADMIN — PREGABALIN 75 MG: 75 CAPSULE ORAL at 21:07

## 2024-09-02 RX ADMIN — INSULIN GLARGINE 70 UNITS: 100 INJECTION, SOLUTION SUBCUTANEOUS at 21:08

## 2024-09-02 RX ADMIN — BUMETANIDE 2 MG: 2 TABLET ORAL at 21:07

## 2024-09-02 RX ADMIN — HYDROCODONE BITARTRATE AND ACETAMINOPHEN 1 TABLET: 5; 325 TABLET ORAL at 21:09

## 2024-09-02 RX ADMIN — INSULIN LISPRO 8 UNITS: 100 INJECTION, SOLUTION INTRAVENOUS; SUBCUTANEOUS at 08:58

## 2024-09-02 RX ADMIN — PREGABALIN 75 MG: 75 CAPSULE ORAL at 08:52

## 2024-09-02 RX ADMIN — ATORVASTATIN CALCIUM 80 MG: 80 TABLET, FILM COATED ORAL at 21:07

## 2024-09-02 RX ADMIN — INSULIN LISPRO 5 UNITS: 100 INJECTION, SOLUTION INTRAVENOUS; SUBCUTANEOUS at 13:36

## 2024-09-02 RX ADMIN — INSULIN LISPRO 12 UNITS: 100 INJECTION, SOLUTION INTRAVENOUS; SUBCUTANEOUS at 13:36

## 2024-09-02 RX ADMIN — DULOXETINE HYDROCHLORIDE 30 MG: 30 CAPSULE, DELAYED RELEASE ORAL at 08:52

## 2024-09-02 RX ADMIN — MUPIROCIN 1 APPLICATION: 20 OINTMENT TOPICAL at 09:00

## 2024-09-02 RX ADMIN — LEVOTHYROXINE SODIUM 137 MCG: 137 TABLET ORAL at 06:04

## 2024-09-02 RX ADMIN — SODIUM CHLORIDE 2000 MG: 900 INJECTION INTRAVENOUS at 00:14

## 2024-09-02 RX ADMIN — TERAZOSIN 1 MG: 1 CAPSULE ORAL at 21:07

## 2024-09-02 RX ADMIN — FUROSEMIDE 80 MG: 10 INJECTION, SOLUTION INTRAMUSCULAR; INTRAVENOUS at 13:35

## 2024-09-02 RX ADMIN — BUMETANIDE 2 MG: 2 TABLET ORAL at 08:53

## 2024-09-02 RX ADMIN — FERROUS SULFATE TAB 325 MG (65 MG ELEMENTAL FE) 325 MG: 325 (65 FE) TAB at 08:52

## 2024-09-02 RX ADMIN — BISOPROLOL FUMARATE 5 MG: 5 TABLET, FILM COATED ORAL at 08:53

## 2024-09-02 RX ADMIN — HYDRALAZINE HYDROCHLORIDE 100 MG: 50 TABLET ORAL at 15:41

## 2024-09-02 NOTE — PLAN OF CARE
Goal Outcome Evaluation:  Plan of Care Reviewed With: patient        Progress: improving  Outcome Evaluation: Pt agreed to PT session, pt suzanne STS w/MOD 1-heavy lean on chair to complete standing, amb ~25ft w/close follow of recliner, pt suzanne staying up in chair after amb , states legs feel much better with swelling decr today, pt plans SNU and thne home to [facility] vs home w/son to her condo, undecided after her sons visit today, he said it was up to her      Anticipated Discharge Disposition (PT): skilled nursing facility, other (see comments) (plans back to [facility] vs condo w/son after SNU)

## 2024-09-02 NOTE — PLAN OF CARE
Goal Outcome Evaluation:  Plan of Care Reviewed With: patient        Progress: improving  Outcome Evaluation: Bilateral legs wrapped. Afebrile VS stable. Continuing to monitor blood sugars. Awaiting plan for rehab. SQ heparin was stopped because of bleeding from foot wound. No visible bleeding at this time. Purewick on tonight.

## 2024-09-02 NOTE — CONSULTS
Referring Provider: Ziggy Prince MD  [address] [place] [id number]  Reason for Consultation: Right lower extremity cellulitis and persistent leukocytosis    Subjective   History of present illness: This is a 84-year-old female with neuroendocrine pancreatic tumor, CKD 3, hypertension, hyperlipidemia, and poorly controlled type 2 diabetes who was admitted on August 24 with increasing right leg erythema.   She states she has been dealing with lower extremity edema for several months.  She was diagnosed with cellulitis of the right lower extremity and was empirically started on cefazolin and vancomycin.  She was on these 2 antibiotics for 4 days and was switched to doxycycline on August 28.  She was switched back to cefazolin on August 31 due to concerns for rising white blood cell count.  Her white blood cell count was 12,000 on admission decreased to normal on August 25 and 26 and then increased to 11,000 on August 27 to 12,000 yesterday.  Currently the patient states she is doing better with decreased swelling of her lower extremities.  She is tolerating antibiotics without abdominal pain vomiting diarrhea or rash    Past Medical History:   Diagnosis Date    Anxiety     Arthritis     Benign essential hypertension 08/20/2014    Bleeding disorder     Depression     Diabetes mellitus, type 2     Disc degeneration, lumbar     Headache, tension-type     Hyperlipidemia     Hypothyroidism     Neuropathy     Osteoporosis 09/09/2015    Pancreatic neuroendocrine tumor.     Sept 2023, on Monthly Octreotide Injection    Peripheral neuropathy     Spinal stenosis    CKD 3    Past Surgical History:   Procedure Laterality Date    APPENDECTOMY      BILATERAL BREAST REDUCTION Bilateral 08/2015    CATARACT EXTRACTION  03/2015    CHOLECYSTECTOMY WITH INTRAOPERATIVE CHOLANGIOGRAM N/A 3/27/2022    KYPHOPLASTY      TONSILLECTOMY          reports that she quit smoking about 11 years ago. Her smoking use included cigarettes. She  started smoking about 51 years ago. She has a 40 pack-year smoking history. She has never used smokeless tobacco. She reports that she does not drink alcohol and does not use drugs.    family history includes Bone cancer in her mother; Heart disease in her daughter; No Known Problems in her father.    Allergies   Allergen Reactions    Sulfa Antibiotics Unknown - High Severity     Pt says it was a long time ago and I don't remember but it wasn't good.        Medication:  Antibiotics:  Cefazolin 2 g IV every 12 hours  Please refer to the medical record for a full medication list    Review of Systems  Pertinent items are noted in HPI, all other systems reviewed and negative    Objective   Vital Signs   Temp:  [96.9 °F (36.1 °C)-98.2 °F (36.8 °C)] 98 °F (36.7 °C)  Heart Rate:  [63-73] 68  Resp:  [16-18] 16  BP: (127-145)/(57-70) 137/60    Physical Exam:   General: In no acute distress  HEENT: Normocephalic, atraumatic, no scleral icterus.   Neck: Supple, trachea is midline  Cardiovascular: Normal rate, regular rhythm, normal S1 and S2, no murmurs, rubs, or gallops   Respiratory: Bibasilar crackles  GI: Abdomen is soft, nontender, nondistended, positive bowel sounds bilaterally  Musculoskeletal: no edema, tenderness or deformity  Skin: No rashes bilateral lower extremity swelling, right slightly greater than left, weeping wounds present without purulence  Extremities: No C/C  Neurological: Alert and oriented, moving all 4 extremities  Psychiatric: Normal mood and affect     Results Review:   I reviewed the patient's new clinical results.  I reviewed the patient's new imaging results and agree with the interpretation.    Lab Results   Component Value Date    WBC 12.43 (H) 09/02/2024    HGB 9.4 (L) 09/02/2024    HCT 29.9 (L) 09/02/2024    MCV 85.9 09/02/2024     09/02/2024       Lab Results   Component Value Date    GLUCOSE 245 (H) 09/02/2024    BUN 64 (H) 09/02/2024    CREATININE 2.44 (H) 09/02/2024    EGFRIFNONA  51 (L) 02/28/2022    EGFRIFAFRI 50 (L) 01/18/2021    BCR 26.2 (H) 09/02/2024    CO2 20.4 (L) 09/02/2024    CALCIUM 8.6 09/02/2024    PROTENTOTREF 6.6 08/26/2024    ALBUMIN 3.3 (L) 09/02/2024    LABIL2 0.7 08/26/2024    AST 12 08/26/2024    ALT <5 08/26/2024     Hemoglobin A1c 7.9    Microbiology:  8/24 BCx neg x 2    Radiology:  8/26 chest x-ray with no pleural effusions or acute infiltrates    Assessment & Plan   Bilateral lower extremity swelling consistent with stasis dermatitis  Leukocytosis  Poorly controlled type 2 diabetes    Her physical exam is more consistent with stasis dermatitis than cellulitis.  Wounds present or results of blister formation in the setting of extreme swelling.  At this time the patient has completed a 9-day course of empiric antibiotics and further antimicrobials are not indicated especially given her history of C. difficile colitis.  Will discontinue cefazolin.  Continue aggressive wound care and leg elevation.  Management of fluid status per nephrology.  Leukocytosis could certainly be reactive.  We will check the patient's chart over the next 2 days and that the white blood cell count continues to increase we can reevaluate the patient.    Patient would benefit from wound care as an outpatient    I discussed the patient's findings and my recommendations with patient and nursing staff

## 2024-09-02 NOTE — PROGRESS NOTES
Name: Halie Guidry ADMIT: 2024   : 1940  PCP: Napoleon Mead MD    MRN: [id number] LOS: 8 days   AGE/SEX: 84 y.o. female  ROOM: [facility]     Subjective   Subjective   Reports lower extremity pain is improved.  Denies other complaints.  Review of Systems   As above  Objective   Objective   Vital Signs  Temp:  [97 °F (36.1 °C)-98.2 °F (36.8 °C)] 98 °F (36.7 °C)  Heart Rate:  [63-71] 68  Resp:  [16-18] 16  BP: (127-143)/(57-70) 137/60  SpO2:  [93 %-97 %] 93 %  on   ;   Device (Oxygen Therapy): room air  Body mass index is 32.01 kg/m².  Physical Exam    General: Alert, sitting up in the bed, not in distress,  HEENT: Normocephalic, atraumatic  CV: Regular rate and rhythm, no murmurs rubs or gallops  Lungs: Clear to auscultation bilaterally, no crackles or wheezes  Abdomen: Soft, nontender, nondistended  Extremities: Bilateral lower extremities 1+ edema, dressing in place, erythema unchanged, improved compared to admission.      Results Review     I reviewed the patient's new clinical results.  Results from last 7 days   Lab Units 24  0436 24  0310   WBC 10*3/mm3 12.43* 12.51* 12.35* 11.82*   HEMOGLOBIN g/dL 9.4* 9.2* 10.0* 9.8*   PLATELETS 10*3/mm3 376 343 375 365     Results from last 7 days   Lab Units 24  0551 24  0533 24  0436 24  0310   SODIUM mmol/L 134* 136 136 140   POTASSIUM mmol/L 4.5 4.3 4.0 3.9   CHLORIDE mmol/L 99 101 102 106   CO2 mmol/L 20.4* 21.0* 19.9* 20.5*   BUN mg/dL 64* 63* 54* 46*   CREATININE mg/dL 2.44* 2.47* 2.51* 2.35*   GLUCOSE mg/dL 245* 259* 222* 189*   Estimated Creatinine Clearance: 20.7 mL/min (A) (by C-G formula based on SCr of 2.44 mg/dL (H)).  Results from last 7 days   Lab Units 24  0551 24  0533 24  0436 24  0310   ALBUMIN g/dL 3.3* 3.3* 3.2* 3.1*     Results from last 7 days   Lab Units 24  0551 24  0533 24  0436 24  0310   CALCIUM mg/dL 8.6 8.4*  8.5* 8.6   ALBUMIN g/dL 3.3* 3.3* 3.2* 3.1*   MAGNESIUM mg/dL 2.1 2.2 1.7 1.8   PHOSPHORUS mg/dL 5.2* 5.4* 5.5* 5.3*           COVID19   Date Value Ref Range Status   01/27/2024 Not Detected Not Detected - Ref. Range Final   01/09/2024 Detected (C) Not Detected - Ref. Range Final     Glucose   Date/Time Value Ref Range Status   09/02/2024 1137 221 (H) 70 - 130 mg/dL Final   09/02/2024 0600 266 (H) 70 - 130 mg/dL Final   09/01/2024 2055 246 (H) 70 - 130 mg/dL Final   09/01/2024 1558 256 (H) 70 - 130 mg/dL Final   09/01/2024 1124 305 (H) 70 - 130 mg/dL Final   09/01/2024 0552 275 (H) 70 - 130 mg/dL Final   08/31/2024 2046 207 (H) 70 - 130 mg/dL Final           XR Chest PA & Lateral  Narrative: CHEST: 2 VIEWS     HISTORY: Dyspnea     COMPARISON: [facility] chest 01/27/2024, 01/09/2024, [facility] chest 05/13/2023.     FINDINGS: Heart size is mildly enlarged. There is increased linear  opacity in the left lung base that is most likely subsegmental  atelectasis but could be related to scarring. There is a calcified  nodule in the right midlung. Lung volumes are diminished. There is no  evidence for pulmonary edema. There is healed or healing right lateral  rib fracture with adjacent pleural thickening and this fracture was  demonstrated on previous PET/CT 06/17/2024.      Impression: Diminished lung volumes with left basilar subsegmental  atelectasis or scarring.     This report was finalized on 8/26/2024 6:27 PM by Blake Murphy M.D  on Workstation: [id number]     Adult Transthoracic Echo Complete W/ Cont if Necessary Per Protocol  •  Left ventricular systolic function is normal. Calculated left   ventricular EF = 61.8% Normal left ventricular cavity size noted. Left   ventricular wall thickness is consistent with moderate concentric   hypertrophy. All left ventricular wall segments contract normally. Left   ventricular diastolic function was normal.  •  The left atrial cavity is moderately dilated.  •  There is moderate  calcification of the aortic valve. The aortic valve   appears trileaflet. Trace aortic valve regurgitation is present. No aortic   valve stenosis is present.  •  Moderate mitral annular calcification is present. Mild mitral valve   regurgitation is present with multiple jets noted. No significant mitral   valve stenosis is present.    Scheduled Medications  atorvastatin, 80 mg, Oral, Nightly  bisoprolol, 5 mg, Oral, Daily  bumetanide, 2 mg, Oral, BID  DULoxetine, 30 mg, Oral, Daily  ferrous sulfate, 325 mg, Oral, Every Other Day  furosemide, 80 mg, Intravenous, Once  [Held by provider] heparin (porcine), 5,000 Units, Subcutaneous, Q12H  hydrALAZINE, 100 mg, Oral, Q8H  hydrocortisone-bacitracin-zinc oxide-nystatin, 1 Application, Topical, BID  insulin glargine, 70 Units, Subcutaneous, Nightly  insulin lispro, 12 Units, Subcutaneous, TID AC  insulin lispro, 3-14 Units, Subcutaneous, 4x Daily AC & at Bedtime  lactobacillus acidophilus, 1 capsule, Oral, Daily  levothyroxine, 137 mcg, Oral, Q AM  melatonin, 2.5 mg, Oral, Nightly  mupirocin, 1 Application, Topical, Daily  pantoprazole, 40 mg, Oral, Q AM  pregabalin, 75 mg, Oral, BID  sodium chloride, 3 mL, Intravenous, Q12H  spironolactone, 50 mg, Oral, Daily  terazosin, 1 mg, Oral, Nightly    Infusions   Diet  Diet: Diabetic, Cardiac, Fluid Restriction (240 mL/tray); Healthy Heart (2-3 Na+); Consistent Carbohydrate; 1500 mL/day; Fluid Consistency: Thin (IDDSI 0)    I have personally reviewed     [x]  Laboratory   []  Microbiology   []  Radiology   []  EKG/Telemetry  []  Cardiology/Vascular   []  Pathology    []  Records       Assessment/Plan     Active Hospital Problems    Diagnosis  POA   • **Cellulitis of right lower extremity [L03.115]  Yes   • Bilateral cellulitis of lower leg [L03.116, L03.115]  Yes   • Bilateral lower extremity edema [R60.0]  Yes   • CKD (chronic kidney disease) stage 4, GFR 15-29 ml/min [N18.4]  Yes   • Acute CHF [I50.9]  Yes   • Anemia due to  chronic kidney disease [N18.9, D63.1]  Yes   • Primary malignant neuroendocrine tumor of pancreas [C7A.8]  Yes   • Peripheral edema [R60.0]  Yes   • Accelerated hypertension [I10]  Yes   • Generalized weakness [R53.1]  Yes   • Hyperlipidemia [E78.5]  Yes   • Type 2 diabetes mellitus with hyperglycemia, with long-term current use of insulin [E11.65, Z79.4]  Not Applicable   • Neuropathy [G62.9]  Yes   • Benign essential hypertension [I10]  Yes      Resolved Hospital Problems   No resolved problems to display.       84-year-old female presents the hospital with concern for leg cellulitis right greater than left with multiple leg wounds.     Cellulitis of the leg right greater than left with bilateral leg wounds:  -Initially patient had some wound drainage concerning for impetigo no no more drainage noted  -Wound care  -swelling improved, erythema remains although improved compared to admission.  Likely due to stress dermatitis.  -ID evaluated, antibiotics continued.  -     Acute CHF, anasarca:  -Echocardiogram 08/26/2024 EF of 61.8%,  -Nephrology managing diuresis,  on po Bumex, extra dose of IV Lasix given     CKD 4:  -Creatinine a stable  -Protein to creatinine ratio 7.974, nephrotic range, likely secondary to diabetes  -Creatinine stable nephrology managing  -     Anemia  -Chart review iron panel last year was consistent with iron deficient anemia  -Repeat iron panel 08/26/2024 mixed, consistent with anemia of chronic disease and iron deficiency anemia  -Initiated on p.o. iron every other day  -Hemoglobin hemoglobin fluctuating but stable.  Monitor.     Type 2 diabetes:   -A1c 7.9.  Monitor blood sugar and adjust insulin as needed.    -Blood glucose remains elevated, increase scheduled lispro to 12 units 3 times daily with meals, continue Sidney, increase scheduled nightly Lantus to 60 units.       Essential hypertension:   Acutely stable on current regimen       Neuropathy:  Continue Lyrica     Neuroendocrine  tumor:   management currently with oncology.     Hyperlipidemia: Continue statin.  Stable.     Hypokalemia  -WNL     Hypothyroidism  -TSH elevated at 21, free T4 and free T3 low 08/27/2024  -Increase levothyroxine to 137 mcg daily from 112 mcg daily  -Will need repeat labs in 4 weeks      SCDs DVT prophylaxis.  Hold heparin due to bleeding wounds.  CODE STATUS: DNR/DNI  Discussed with patient.  Disposition, SNF oscillates, medically stable to be discharged once arranged.  Patient lives in assisted living in the setting of multiple comorbidities, patient's age, and recurrent admission, recommended LTC, patient considering LTC going forward.  Expected Discharge Date: 9/1/2024; Expected Discharge Time:        Copied text in this note has been reviewed and is accurate as of 09/02/24.         Dictated utilizing Dragon dictation        Balbina Painting MD  [facility]  09/02/24  13:31 EDT

## 2024-09-02 NOTE — PLAN OF CARE
Goal Outcome Evaluation:  VSS, no c/o pain, up in chair, up to BSC, bm x 1, purewick in use, dressings changed to B/L lower extremities, small amount sero-sanguineous drainage on right leg, scant amount serous drainage on left leg, falls protocol, bed/chair alarm in use  Plan of Care Reviewed With: patient

## 2024-09-02 NOTE — THERAPY TREATMENT NOTE
Patient Name: Halie Guidry  : 1940    MRN: [id number]                              Today's Date: 2024       Admit Date: 2024    Visit Dx:     ICD-10-CM ICD-9-CM   1. Cellulitis of lower extremity, unspecified laterality  L03.119 682.6   2. Bilateral lower extremity edema  R60.0 782.3   3. Chronic kidney disease, unspecified CKD stage  N18.9 585.9   4. History of diabetes mellitus  Z86.39 V12.29     Patient Active Problem List   Diagnosis    Compression fracture    Lumbar degenerative disc disease    Type 2 diabetes mellitus with hyperglycemia, with long-term current use of insulin    Hyperlipidemia    Benign essential hypertension    Primary hypothyroidism    Neuropathy    Osteoporosis    Proteinuria    Tobacco abuse    Generalized weakness    Spinal stenosis    Scoliosis    Arthritis    VBI (vertebrobasilar insufficiency)    Orthostatic hypotension    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    Severe hypothyroidism    Noncompliance with medication regimen    Vitamin D deficiency disease    Tremor    Seizure    Thoracic degenerative disc disease    Hyperglycemia    Type II diabetes mellitus, uncontrolled    Lower abdominal pain    Transaminitis    Emphysematous cystitis    Choledocholithiasis    Hypokalemia    Hypoxia    Generalized abdominal pain    History of Clostridium difficile infection    History of ERCP    Hypomagnesemia    Anxiety disorder    Neuropathic pain    Intertrigo    Weakness of both lower extremities    Accelerated hypertension    Acute cystitis without hematuria    Left lower lobe pneumonia    Cellulitis and abscess of left lower extremity    Benign hypertension with CKD (chronic kidney disease) stage IV    Peripheral edema    Primary malignant neuroendocrine tumor of pancreas    Encounter for long-term (current) use of high-risk medication    Diabetic foot ulcer    Stage 3a chronic kidney disease    Pressure injury of buttock, stage 2    HTN (hypertension)     Anemia due to chronic kidney disease    Bilateral lower extremity edema    Cellulitis of right lower extremity    CKD (chronic kidney disease) stage 4, GFR 15-29 ml/min    Acute CHF    Bilateral cellulitis of lower leg     Past Medical History:   Diagnosis Date    Acute metabolic encephalopathy 06/24/2022    Anxiety     Arthritis     Benign essential hypertension 08/20/2014    Bleeding disorder     Depression     Diabetes     Diabetes mellitus, type 2     Disc degeneration, lumbar     Headache, tension-type     Hyperlipidemia     Hypothyroidism     Neuropathy     Osteoporosis 09/09/2015    Pancreatic mass     Sept 2023, on Monthly Octreotide Injection    Peripheral neuropathy     Rotator cuff tear, left     Scoliosis     Shoulder pain     LEFT, TORN ROTATOR CUFF S/P FALL    Spinal stenosis      Past Surgical History:   Procedure Laterality Date    APPENDECTOMY      BILATERAL BREAST REDUCTION Bilateral 08/2015    CATARACT EXTRACTION  03/2015    CHOLECYSTECTOMY WITH INTRAOPERATIVE CHOLANGIOGRAM N/A 3/27/2022    Procedure: CHOLECYSTECTOMY LAPAROSCOPIC INTRAOPERATIVE CHOLANGIOGRAM;  Surgeon: Aiyana Hill MD;  Location: [facility] MAIN OR;  Service: General;  Laterality: N/A;    COLONOSCOPY  06/05/2015    WNL    ERCP N/A 2/25/2022    Procedure: ENDOSCOPIC RETROGRADE CHOLANGIOPANCREATOGRAPHY with sphincterotomy and balloon sweep;  Surgeon: Chilo Wilhelm MD;  Location: [facility] ENDOSCOPY;  Service: Gastroenterology;  Laterality: N/A;  PRE/POST - CBD stones    ERCP N/A 3/28/2022    Procedure: ENDOSCOPIC RETROGRADE CHOLANGIOPANCREATOGRAPHY WITH SPHINCTEROTOMY AND BALLOON SWEEP;  Surgeon: Chilo Wilhelm MD;  Location: [facility] ENDOSCOPY;  Service: Gastroenterology;  Laterality: N/A;  PRE: COMMON DUCT STONE  POST: COMMON DUCT STONE    KYPHOPLASTY      REDUCTION MAMMAPLASTY      TONSILLECTOMY        General Information       Row Name 09/02/24 1713          Physical Therapy Time and Intention    Document Type therapy  note (daily note)  -     Mode of Treatment individual therapy;physical therapy  -       Row Name 09/02/24 1713          General Information    Patient Profile Reviewed yes  -     Existing Precautions/Restrictions fall  -     Barriers to Rehab medically complex;previous functional deficit  -[facility] Name 09/02/24 1713          Living Environment    People in Home facility resident  but may need SNU , then possibly moving back with son  -[facility] Name 09/02/24 1713          Cognition    Orientation Status (Cognition) oriented x 4  -[facility] Name 09/02/24 1713          Safety Issues, Functional Mobility    Impairments Affecting Function (Mobility) balance;coordination;endurance/activity tolerance;sensation/sensory awareness;strength;postural/trunk control  -     Comment, Safety Issues/Impairments (Mobility) reports has decr feeling in LEs  -               User Key  (r) = Recorded By, (t) = Taken By, (c) = Cosigned By      Initials Name Provider Type    Beena Downs PTA Physical Therapist Assistant                   Mobility       [facility] Name 09/02/24 1714          Bed Mobility    Comment, (Bed Mobility) in chair  -[facility] Name 09/02/24 1714          Sit-Stand Transfer    Sit-Stand [place] (Transfers) moderate assist (50% patient effort);verbal cues;nonverbal cues (demo/gesture)  -     Assistive Device (Sit-Stand Transfers) walker, front-wheeled  -     Comment, (Sit-Stand Transfer) Heavy lean with LEs on chair to stand  -[facility] Name 09/02/24 1714          Gait/Stairs (Locomotion)    [place] Level (Gait) minimum assist (75% patient effort);verbal cues;nonverbal cues (demo/gesture)  -     Assistive Device (Gait) walker, front-wheeled  -     Distance in Feet (Gait) 25  followed w/chair for safety  -     Deviations/Abnormal Patterns (Gait) tess decreased;festinating/shuffling;stride length decreased  -     Bilateral Gait Deviations forward flexed  posture;weight shift ability decreased  -     Comment, (Gait/Stairs) improved w/advancing LEs, but ER of feet to get to move forw, incr forw flex, states that is her baseline recently  -               User Key  (r) = Recorded By, (t) = Taken By, (c) = Cosigned By      Initials Name Provider Type    Beena Downs PTA Physical Therapist Assistant                   Obj/Interventions    No documentation.                  Goals/Plan    No documentation.                  Clinical Impression       Row Name 09/02/24 1721          Pain    Pre/Posttreatment Pain Comment did not rate or complain today, just stated her legs were weak  -       Row Name 09/02/24 1721          Plan of Care Review    Plan of Care Reviewed With patient  -     Progress improving  -     Outcome Evaluation Pt agreed to PT session, pt suzanne STS w/MOD 1-heavy lean on chair to complete standing, amb ~25ft w/close follow of recliner, pt suzanne staying up in chair after amb , states legs feel much better with swelling decr today, pt plans SNU and thne home to [facility] vs home w/son to her condo, undecided after her sons visit today, he said it was up to her  -       Row Name 09/02/24 1721          Therapy Assessment/Plan (PT)    Rehab Potential (PT) good, to achieve stated therapy goals  -     Criteria for Skilled Interventions Met (PT) yes  -       Row Name 09/02/24 1721          Vital Signs    O2 Delivery Pre Treatment room air  -       Row Name 09/02/24 1721          Positioning and Restraints    Pre-Treatment Position sitting in chair/recliner  -     Post Treatment Position chair  -     In Chair reclined;call light within reach;encouraged to call for assist;exit alarm on;notified nsg;heels elevated;legs elevated  -               User Key  (r) = Recorded By, (t) = Taken By, (c) = Cosigned By      Initials Name Provider Type    Beena Downs PTA Physical Therapist Assistant                   Outcome Measures       Row Name  09/02/24 1725 09/02/24 0850       How much help from another person do you currently need...    Turning from your back to your side while in flat bed without using bedrails? 3  -JM 3  -MM    Moving from lying on back to sitting on the side of a flat bed without bedrails? 3  -JM 3  -MM    Moving to and from a bed to a chair (including a wheelchair)? 3  -JM 3  -MM    Standing up from a chair using your arms (e.g., wheelchair, bedside chair)? 2  -JM 3  -MM    Climbing 3-5 steps with a railing? 1  -JM 2  -MM    To walk in hospital room? 3  -JM 3  -MM    AM-PAC 6 Clicks Score (PT) 15  -JM 17  -MM    Highest Level of Mobility Goal 4 --> Transfer to chair/commode  -JM 5 --> Static standing  -MM              User Key  (r) = Recorded By, (t) = Taken By, (c) = Cosigned By      Initials Name Provider Type    Kelsey Hauser RN Registered Nurse    Beena Downs PTA Physical Therapist Assistant                                 Physical Therapy Education       Title: PT OT SLP Therapies (Done)       Topic: Physical Therapy (Done)       Point: Mobility training (Done)       Learning Progress Summary             Patient Acceptance, E,TB,D, VU,NR by JOSE ALBERTO at 9/2/2024 1726    Acceptance, E,TB, VU,NR by SUSAN at 8/30/2024 1559    Acceptance, E,TB, VU,NR by SUSAN at 8/29/2024 1440    Acceptance, E, VU,NR by EMILY at 8/29/2024 0428    Acceptance, E,TB,D, VU,NR by JOSE ALBERTO at 8/28/2024 1631    Acceptance, E, VU by ESPINOZA at 8/25/2024 1435                         Point: Home exercise program (Done)       Learning Progress Summary             Patient Acceptance, E,TB,D, VU,NR by JOSE ALBERTO at 9/2/2024 1726    Acceptance, E,TB, VU,NR by SUSAN at 8/29/2024 1440    Acceptance, E, VU,NR by EMILY at 8/29/2024 0428    Acceptance, E,TB,D, VU,NR by JOSE ALBERTO at 8/28/2024 1631                         Point: Body mechanics (Done)       Learning Progress Summary             Patient Acceptance, E,TB,D, VU,NR by JOSE ALBERTO at 9/2/2024 1726    Acceptance, E,RADAMES PANTOJA,NR by SUSAN at 8/30/2024 0224     Acceptance, E,TB, VU,NR by CB at 8/29/2024 1440    Acceptance, E, VU,NR by  at 8/29/2024 0428    Acceptance, E,TB,D, VU,NR by  at 8/28/2024 1631    Acceptance, E, VU by  at 8/25/2024 1435                         Point: Precautions (Done)       Learning Progress Summary             Patient Acceptance, E,TB,D, VU,NR by JOSE ALBERTO at 9/2/2024 1726    Acceptance, E,TB, VU,NR by CB at 8/30/2024 1559    Acceptance, E,TB, VU,NR by CB at 8/29/2024 1440    Acceptance, E, VU,NR by RC at 8/29/2024 0428    Acceptance, E,TB,D, VU,NR by  at 8/28/2024 1631    Acceptance, E, VU by  at 8/25/2024 1435                                         User Key       Initials Effective Dates Name Provider Type Discipline    JOSE ALBERTO 03/07/18 -  Beena Ray PTA Physical Therapist Assistant PT     10/22/21 -  Manisha Su, PT Physical Therapist PT     01/16/24 -  Kenya Ruiz, PT Physical Therapist PT     06/25/24 -  Josephine Puckett, RN Registered Nurse Nurse                  PT Recommendation and Plan     Plan of Care Reviewed With: patient  Progress: improving  Outcome Evaluation: Pt agreed to PT session, pt suzanne STS w/MOD 1-heavy lean on chair to complete standing, amb ~25ft w/close follow of recliner, pt suzanne staying up in chair after amb , states legs feel much better with swelling decr today, pt plans SNU and thne home to group home vs home w/son to her condo, undecided after her sons visit today, he said it was up to her     Time Calculation:         PT Charges       Row Name 09/02/24 1726             Time Calculation    Start Time 1605  -JOSE ALBERTO      Stop Time 1640  -JOSE ALBERTO      Time Calculation (min) 35 min  -JOSE ALBERTO      PT Received On 09/02/24  -JOSE ALBERTO      PT - Next Appointment 09/03/24  -                User Key  (r) = Recorded By, (t) = Taken By, (c) = Cosigned By      Initials Name Provider Type    Beena Downs PTA Physical Therapist Assistant                  Therapy Charges for Today       Code Description Service Date Service  Provider Modifiers Qty    [id number] HC PT THERAPEUTIC ACT EA 15 MIN 9/2/2024 Beena Ray, PTA GP 2            PT G-Codes  Outcome Measure Options: AM-PAC 6 Clicks Basic Mobility (PT)  AM-PAC 6 Clicks Score (PT): 15  AM-PAC 6 Clicks Score (OT): 16  PT Discharge Summary  Anticipated Discharge Disposition (PT): skilled nursing facility, other (see comments) (plans back to [facility] vs condo w/son after SNU)    Beena Ray PTA  9/2/2024

## 2024-09-02 NOTE — PROGRESS NOTES
[facility] Progress Note      Patient Name: Halie Guidry  : 1940  MRN: [id number]  Primary Care Physician:  Napoleon Mead MD  Date of admission: 2024    Subjective     Interval History:   F/U CKD 4    Feels better; breathing is comfortable on room air  UOP yesterday 1.8 L; on oral Bumex 2 mg twice daily  Leg swelling has improved greatly; now able to lift legs off bed  Walking some; dyspnea is less  Weights taken while in bed and are not reliable    Vitals:   Temp:  [96.9 °F (36.1 °C)-98.2 °F (36.8 °C)] 98 °F (36.7 °C)  Heart Rate:  [63-73] 68  Resp:  [16-18] 16  BP: (127-145)/(57-70) 137/60    Intake/Output Summary (Last 24 hours) at 2024 1059  Last data filed at 2024 0556  Gross per 24 hour   Intake --   Output 1400 ml   Net -1400 ml       Physical Exam:    General: alert, oriented x 3, NAD, chronically ill, overweight  Skin: warm and dry  HEENT: oral mucosa normal, nonicteric sclera  Neck: supple, no JVD  Lungs: bibasilar crackles.  No wheezing.  Comfortable on RA  Heart: RRR, normal S1 and S2  Abdomen: soft, nontender, nondistended. +BS  : no palpable bladder, external urinary catheter  Extremities: 1+ lower extremity edema; lower legs wrapped  Neuro: normal speech and mental status     Scheduled Meds:     atorvastatin, 80 mg, Oral, Nightly  bisoprolol, 5 mg, Oral, Daily  bumetanide, 2 mg, Oral, BID  ceFAZolin, 2,000 mg, Intravenous, Q12H  DULoxetine, 30 mg, Oral, Daily  ferrous sulfate, 325 mg, Oral, Every Other Day  [Held by provider] heparin (porcine), 5,000 Units, Subcutaneous, Q12H  hydrALAZINE, 100 mg, Oral, Q8H  hydrocortisone-bacitracin-zinc oxide-nystatin, 1 Application, Topical, BID  insulin glargine, 70 Units, Subcutaneous, Nightly  insulin lispro, 12 Units, Subcutaneous, TID AC  insulin lispro, 3-14 Units, Subcutaneous, 4x Daily AC & at Bedtime  lactobacillus acidophilus, 1 capsule, Oral, Daily  levothyroxine, 137 mcg, Oral, Q  AM  melatonin, 2.5 mg, Oral, Nightly  mupirocin, 1 Application, Topical, Daily  pantoprazole, 40 mg, Oral, Q AM  pregabalin, 75 mg, Oral, BID  sodium chloride, 3 mL, Intravenous, Q12H  spironolactone, 50 mg, Oral, Daily  terazosin, 1 mg, Oral, Nightly      IV Meds:          Results Reviewed:   I have personally reviewed the results from the time of this admission to 9/2/2024 10:59 EDT     Results from last 7 days   Lab Units 09/02/24  0551 09/01/24  0533 08/31/24  0436   SODIUM mmol/L 134* 136 136   POTASSIUM mmol/L 4.5 4.3 4.0   CHLORIDE mmol/L 99 101 102   CO2 mmol/L 20.4* 21.0* 19.9*   BUN mg/dL 64* 63* 54*   CREATININE mg/dL 2.44* 2.47* 2.51*   CALCIUM mg/dL 8.6 8.4* 8.5*   GLUCOSE mg/dL 245* 259* 222*       Estimated Creatinine Clearance: 20.7 mL/min (A) (by C-G formula based on SCr of 2.44 mg/dL (H)).    Results from last 7 days   Lab Units 09/02/24  0551 09/01/24  0533 08/31/24  0436   MAGNESIUM mg/dL 2.1 2.2 1.7   PHOSPHORUS mg/dL 5.2* 5.4* 5.5*             Results from last 7 days   Lab Units 09/02/24  0551 09/01/24  0533 08/31/24  0436 08/30/24  0310 08/29/24  0756   WBC 10*3/mm3 12.43* 12.51* 12.35* 11.82* 11.24*   HEMOGLOBIN g/dL 9.4* 9.2* 10.0* 9.8* 9.9*   PLATELETS 10*3/mm3 376 343 375 365 356             Assessment / Plan     ASSESSMENT:  CKD 4, stable with SCr at plateau, likely reflecting new baseline following diuresis.  Has chronic cardiorenal syndrome.  Improving volume excess; potassium normal  Nephrotic syndrome, likely due to diabetic nephropathy, adding to fluid retention  Heart failure with preserved ejection fraction; diuresing well on oral Bumex 2 mg twice daily.  Leg swelling has improved  Right lower extremity cellulitis on vancomycin and Kefzol  Hypertension, improving with diuresis  Anemia of CKD  DM2  Hypothyroidism, contributing to heart failure.  Replacement dose has been adjusted    PLAN:  Continue oral Bumex 2 mg twice daily, though give extra dose of furosemide 80 mg IV  today  Add fluid restriction, 1500 mL/day  To rehab anytime from renal view    Thank you for involving us in the care of Halie Guidry.  Please feel free to call with any questions.    Brendon Aguilar MD   09/02/24  10:59 EDT    [facility]  [phone]    Please note that portions of this note were completed with a voice recognition program.

## 2024-09-03 LAB
ALBUMIN SERPL-MCNC: 3.4 G/DL (ref 3.5–5.2)
ANION GAP SERPL CALCULATED.3IONS-SCNC: 16 MMOL/L (ref 5–15)
BUN SERPL-MCNC: 74 MG/DL (ref 8–23)
BUN/CREAT SERPL: 26.8 (ref 7–25)
CALCIUM SPEC-SCNC: 8.9 MG/DL (ref 8.6–10.5)
CHLORIDE SERPL-SCNC: 98 MMOL/L (ref 98–107)
CO2 SERPL-SCNC: 21 MMOL/L (ref 22–29)
CREAT SERPL-MCNC: 2.76 MG/DL (ref 0.57–1)
DEPRECATED RDW RBC AUTO: 46.6 FL (ref 37–54)
EGFRCR SERPLBLD CKD-EPI 2021: 16.5 ML/MIN/1.73
ERYTHROCYTE [DISTWIDTH] IN BLOOD BY AUTOMATED COUNT: 15.5 % (ref 12.3–15.4)
GLUCOSE BLDC GLUCOMTR-MCNC: 172 MG/DL (ref 70–130)
GLUCOSE BLDC GLUCOMTR-MCNC: 195 MG/DL (ref 70–130)
GLUCOSE BLDC GLUCOMTR-MCNC: 219 MG/DL (ref 70–130)
GLUCOSE BLDC GLUCOMTR-MCNC: 96 MG/DL (ref 70–130)
GLUCOSE SERPL-MCNC: 173 MG/DL (ref 65–99)
HCT VFR BLD AUTO: 28.2 % (ref 34–46.6)
HGB BLD-MCNC: 9.1 G/DL (ref 12–15.9)
MAGNESIUM SERPL-MCNC: 2.4 MG/DL (ref 1.6–2.4)
MCH RBC QN AUTO: 27.3 PG (ref 26.6–33)
MCHC RBC AUTO-ENTMCNC: 32.3 G/DL (ref 31.5–35.7)
MCV RBC AUTO: 84.7 FL (ref 79–97)
PHOSPHATE SERPL-MCNC: 6 MG/DL (ref 2.5–4.5)
PLATELET # BLD AUTO: 348 10*3/MM3 (ref 140–450)
PMV BLD AUTO: 9.6 FL (ref 6–12)
POTASSIUM SERPL-SCNC: 4.5 MMOL/L (ref 3.5–5.2)
RBC # BLD AUTO: 3.33 10*6/MM3 (ref 3.77–5.28)
SODIUM SERPL-SCNC: 135 MMOL/L (ref 136–145)
WBC NRBC COR # BLD AUTO: 10.79 10*3/MM3 (ref 3.4–10.8)

## 2024-09-03 PROCEDURE — 63710000001 INSULIN GLARGINE PER 5 UNITS: Performed by: STUDENT IN AN ORGANIZED HEALTH CARE EDUCATION/TRAINING PROGRAM

## 2024-09-03 PROCEDURE — 80069 RENAL FUNCTION PANEL: CPT | Performed by: INTERNAL MEDICINE

## 2024-09-03 PROCEDURE — 83735 ASSAY OF MAGNESIUM: CPT | Performed by: STUDENT IN AN ORGANIZED HEALTH CARE EDUCATION/TRAINING PROGRAM

## 2024-09-03 PROCEDURE — 85027 COMPLETE CBC AUTOMATED: CPT | Performed by: STUDENT IN AN ORGANIZED HEALTH CARE EDUCATION/TRAINING PROGRAM

## 2024-09-03 PROCEDURE — 82948 REAGENT STRIP/BLOOD GLUCOSE: CPT

## 2024-09-03 PROCEDURE — 63710000001 INSULIN LISPRO (HUMAN) PER 5 UNITS: Performed by: INTERNAL MEDICINE

## 2024-09-03 PROCEDURE — 97530 THERAPEUTIC ACTIVITIES: CPT

## 2024-09-03 PROCEDURE — 63710000001 INSULIN LISPRO (HUMAN) PER 5 UNITS: Performed by: STUDENT IN AN ORGANIZED HEALTH CARE EDUCATION/TRAINING PROGRAM

## 2024-09-03 RX ADMIN — SPIRONOLACTONE 50 MG: 50 TABLET ORAL at 09:02

## 2024-09-03 RX ADMIN — INSULIN LISPRO 12 UNITS: 100 INJECTION, SOLUTION INTRAVENOUS; SUBCUTANEOUS at 17:57

## 2024-09-03 RX ADMIN — MUPIROCIN 1 APPLICATION: 20 OINTMENT TOPICAL at 09:03

## 2024-09-03 RX ADMIN — Medication 1 APPLICATION: at 20:38

## 2024-09-03 RX ADMIN — Medication 1 APPLICATION: at 09:03

## 2024-09-03 RX ADMIN — Medication 2.5 MG: at 20:33

## 2024-09-03 RX ADMIN — PANTOPRAZOLE SODIUM 40 MG: 40 TABLET, DELAYED RELEASE ORAL at 06:26

## 2024-09-03 RX ADMIN — INSULIN LISPRO 3 UNITS: 100 INJECTION, SOLUTION INTRAVENOUS; SUBCUTANEOUS at 21:18

## 2024-09-03 RX ADMIN — INSULIN LISPRO 12 UNITS: 100 INJECTION, SOLUTION INTRAVENOUS; SUBCUTANEOUS at 09:01

## 2024-09-03 RX ADMIN — LEVOTHYROXINE SODIUM 137 MCG: 137 TABLET ORAL at 06:26

## 2024-09-03 RX ADMIN — Medication 3 ML: at 09:04

## 2024-09-03 RX ADMIN — BISOPROLOL FUMARATE 5 MG: 5 TABLET, FILM COATED ORAL at 09:02

## 2024-09-03 RX ADMIN — HYDROCODONE BITARTRATE AND ACETAMINOPHEN 1 TABLET: 5; 325 TABLET ORAL at 17:59

## 2024-09-03 RX ADMIN — BUMETANIDE 2 MG: 2 TABLET ORAL at 09:03

## 2024-09-03 RX ADMIN — ATORVASTATIN CALCIUM 80 MG: 80 TABLET, FILM COATED ORAL at 20:33

## 2024-09-03 RX ADMIN — Medication 3 ML: at 20:34

## 2024-09-03 RX ADMIN — ACETAMINOPHEN 325MG 650 MG: 325 TABLET ORAL at 20:42

## 2024-09-03 RX ADMIN — DULOXETINE HYDROCHLORIDE 30 MG: 30 CAPSULE, DELAYED RELEASE ORAL at 09:03

## 2024-09-03 RX ADMIN — HYDRALAZINE HYDROCHLORIDE 100 MG: 50 TABLET ORAL at 06:26

## 2024-09-03 RX ADMIN — PREGABALIN 75 MG: 75 CAPSULE ORAL at 09:02

## 2024-09-03 RX ADMIN — Medication 1 CAPSULE: at 09:03

## 2024-09-03 RX ADMIN — INSULIN LISPRO 5 UNITS: 100 INJECTION, SOLUTION INTRAVENOUS; SUBCUTANEOUS at 12:12

## 2024-09-03 RX ADMIN — PREGABALIN 75 MG: 75 CAPSULE ORAL at 20:33

## 2024-09-03 RX ADMIN — INSULIN LISPRO 3 UNITS: 100 INJECTION, SOLUTION INTRAVENOUS; SUBCUTANEOUS at 09:04

## 2024-09-03 RX ADMIN — TERAZOSIN 1 MG: 1 CAPSULE ORAL at 20:37

## 2024-09-03 RX ADMIN — BUMETANIDE 2 MG: 2 TABLET ORAL at 17:57

## 2024-09-03 RX ADMIN — HYDROCODONE BITARTRATE AND ACETAMINOPHEN 1 TABLET: 5; 325 TABLET ORAL at 06:26

## 2024-09-03 RX ADMIN — INSULIN LISPRO 12 UNITS: 100 INJECTION, SOLUTION INTRAVENOUS; SUBCUTANEOUS at 12:12

## 2024-09-03 RX ADMIN — INSULIN GLARGINE 70 UNITS: 100 INJECTION, SOLUTION SUBCUTANEOUS at 21:18

## 2024-09-03 NOTE — PROGRESS NOTES
Name: Halie Guidry ADMIT: 2024   : 1940  PCP: Napoleon Mead MD    MRN: [id number] LOS: 9 days   AGE/SEX: 84 y.o. female  ROOM: [facility]     Subjective   Subjective   No new complaints.  Laying in bed.  WBC normalized.  Remains afebrile.  Review of Systems   As above  Objective   Objective   Vital Signs  Temp:  [97.8 °F (36.6 °C)-98 °F (36.7 °C)] 97.9 °F (36.6 °C)  Heart Rate:  [65-68] 65  Resp:  [16] 16  BP: (114-137)/(59-64) 123/64  SpO2:  [90 %-97 %] 90 %  on   ;   Device (Oxygen Therapy): room air  Body mass index is 32.21 kg/m².  Physical Exam    General: Alert, laying in bed, not in distress,  HEENT: Normocephalic, atraumatic  CV: Regular rate and rhythm, no murmurs rubs or gallops  Lungs: Clear to auscultation bilaterally, no crackles or wheezes  Abdomen: Soft, nontender, nondistended  Extremities: Bilateral lower extremities 1+ edema, wrapped      Results Review     I reviewed the patient's new clinical results.  Results from last 7 days   Lab Units 2451 24  0436   WBC 10*3/mm3 10.79 12.43* 12.51* 12.35*   HEMOGLOBIN g/dL 9.1* 9.4* 9.2* 10.0*   PLATELETS 10*3/mm3 348 376 343 375     Results from last 7 days   Lab Units 2433 24  0551 24  0533 24  0436   SODIUM mmol/L 135* 134* 136 136   POTASSIUM mmol/L 4.5 4.5 4.3 4.0   CHLORIDE mmol/L 98 99 101 102   CO2 mmol/L 21.0* 20.4* 21.0* 19.9*   BUN mg/dL 74* 64* 63* 54*   CREATININE mg/dL 2.76* 2.44* 2.47* 2.51*   GLUCOSE mg/dL 173* 245* 259* 222*   Estimated Creatinine Clearance: 18.4 mL/min (A) (by C-G formula based on SCr of 2.76 mg/dL (H)).  Results from last 7 days   Lab Units 24  0551 24  0524  0436   ALBUMIN g/dL 3.4* 3.3* 3.3* 3.2*     Results from last 7 days   Lab Units 24  0533 24  0436   CALCIUM mg/dL 8.9 8.6 8.4* 8.5*   ALBUMIN g/dL 3.4* 3.3* 3.3* 3.2*   MAGNESIUM mg/dL 2.4 2.1  2.2 1.7   PHOSPHORUS mg/dL 6.0* 5.2* 5.4* 5.5*           COVID19   Date Value Ref Range Status   01/27/2024 Not Detected Not Detected - Ref. Range Final   01/09/2024 Detected (C) Not Detected - Ref. Range Final     Glucose   Date/Time Value Ref Range Status   09/03/2024 0607 195 (H) 70 - 130 mg/dL Final   09/02/2024 2049 177 (H) 70 - 130 mg/dL Final   09/02/2024 1712 109 70 - 130 mg/dL Final   09/02/2024 1137 221 (H) 70 - 130 mg/dL Final   09/02/2024 0600 266 (H) 70 - 130 mg/dL Final   09/01/2024 2055 246 (H) 70 - 130 mg/dL Final   09/01/2024 1558 256 (H) 70 - 130 mg/dL Final           XR Chest PA & Lateral  Narrative: CHEST: 2 VIEWS     HISTORY: Dyspnea     COMPARISON: AP chest 01/27/2024, 01/09/2024, AP chest 05/13/2023.     FINDINGS: Heart size is mildly enlarged. There is increased linear  opacity in the left lung base that is most likely subsegmental  atelectasis but could be related to scarring. There is a calcified  nodule in the right midlung. Lung volumes are diminished. There is no  evidence for pulmonary edema. There is healed or healing right lateral  rib fracture with adjacent pleural thickening and this fracture was  demonstrated on previous PET/CT 06/17/2024.      Impression: Diminished lung volumes with left basilar subsegmental  atelectasis or scarring.     This report was finalized on 8/26/2024 6:27 PM by Blake Murphy M.D  on Workstation: [id number]     Adult Transthoracic Echo Complete W/ Cont if Necessary Per Protocol  •  Left ventricular systolic function is normal. Calculated left   ventricular EF = 61.8% Normal left ventricular cavity size noted. Left   ventricular wall thickness is consistent with moderate concentric   hypertrophy. All left ventricular wall segments contract normally. Left   ventricular diastolic function was normal.  •  The left atrial cavity is moderately dilated.  •  There is moderate calcification of the aortic valve. The aortic valve   appears trileaflet. Trace  aortic valve regurgitation is present. No aortic   valve stenosis is present.  •  Moderate mitral annular calcification is present. Mild mitral valve   regurgitation is present with multiple jets noted. No significant mitral   valve stenosis is present.    Scheduled Medications  atorvastatin, 80 mg, Oral, Nightly  bisoprolol, 5 mg, Oral, Daily  bumetanide, 2 mg, Oral, BID  DULoxetine, 30 mg, Oral, Daily  ferrous sulfate, 325 mg, Oral, Every Other Day  [Held by provider] heparin (porcine), 5,000 Units, Subcutaneous, Q12H  hydrALAZINE, 100 mg, Oral, Q8H  hydrocortisone-bacitracin-zinc oxide-nystatin, 1 Application, Topical, BID  insulin glargine, 70 Units, Subcutaneous, Nightly  insulin lispro, 12 Units, Subcutaneous, TID AC  insulin lispro, 3-14 Units, Subcutaneous, 4x Daily AC & at Bedtime  lactobacillus acidophilus, 1 capsule, Oral, Daily  levothyroxine, 137 mcg, Oral, Q AM  melatonin, 2.5 mg, Oral, Nightly  mupirocin, 1 Application, Topical, Daily  pantoprazole, 40 mg, Oral, Q AM  pregabalin, 75 mg, Oral, BID  sodium chloride, 3 mL, Intravenous, Q12H  spironolactone, 50 mg, Oral, Daily  terazosin, 1 mg, Oral, Nightly    Infusions   Diet  Diet: Diabetic, Cardiac, Fluid Restriction (240 mL/tray); Healthy Heart (2-3 Na+); Consistent Carbohydrate; 1500 mL/day; Fluid Consistency: Thin (IDDSI 0)    I have personally reviewed     [x]  Laboratory   []  Microbiology   []  Radiology   []  EKG/Telemetry  []  Cardiology/Vascular   []  Pathology    []  Records       Assessment/Plan     Active Hospital Problems    Diagnosis  POA   • **Cellulitis of right lower extremity [L03.115]  Yes   • Bilateral cellulitis of lower leg [L03.116, L03.115]  Yes   • Bilateral lower extremity edema [R60.0]  Yes   • CKD (chronic kidney disease) stage 4, GFR 15-29 ml/min [N18.4]  Yes   • Acute CHF [I50.9]  Yes   • Anemia due to chronic kidney disease [N18.9, D63.1]  Yes   • Primary malignant neuroendocrine tumor of pancreas [C7A.8]  Yes   •  Peripheral edema [R60.0]  Yes   • Accelerated hypertension [I10]  Yes   • Generalized weakness [R53.1]  Yes   • Hyperlipidemia [E78.5]  Yes   • Type 2 diabetes mellitus with hyperglycemia, with long-term current use of insulin [E11.65, Z79.4]  Not Applicable   • Neuropathy [G62.9]  Yes   • Benign essential hypertension [I10]  Yes      Resolved Hospital Problems   No resolved problems to display.       84-year-old female presents the hospital with concern for leg cellulitis right greater than left with multiple leg wounds.     Cellulitis of the leg right greater than left with bilateral leg wounds:  -Initially patient had some wound drainage concerning for impetigo no no more drainage noted  -Wound care  -swelling improved, erythema remains although improved compared to admission.  Likely due to stress dermatitis.  -ID evaluated, antibiotics continued.  -WBC normalized.     Acute CHF, anasarca:  -Echocardiogram 08/26/2024 EF of 61.8%,  -Nephrology managing diuresis,  on po Bumex,        CKD 4:  -Creatinine a stable  -Protein to creatinine ratio 7.974, nephrotic range, likely secondary to diabetes  -Creatinine trending up, monitor daily BMP  -Nephrology managing     Anemia  -Chart review iron panel last year was consistent with iron deficient anemia  -Repeat iron panel 08/26/2024 mixed, consistent with anemia of chronic disease and iron deficiency anemia  -Initiated on p.o. iron every other day  -Hemoglobin  fluctuating but stable.  Monitor.     Type 2 diabetes:   -A1c 7.9.  Monitor blood sugar and adjust insulin as needed.    -Stable on current regimen, continue       Essential hypertension:   Improved.  Continue current regimen.       Neuropathy:  Continue Lyrica     Neuroendocrine tumor:   management currently with oncology.     Hyperlipidemia: Continue statin.  Stable.     Hypokalemia  -WNL     Hypothyroidism  -TSH elevated at 21, free T4 and free T3 low 08/27/2024  -Increase levothyroxine to 137 mcg daily from  112 mcg daily  -Will need repeat labs in 4 weeks      SCDs DVT prophylaxis.  Hold heparin due to bleeding wounds.  CODE STATUS: DNR/DNI  Discussed with patient.  Discussed with Nephrology  Disposition, SNF, nephrology recommends to keep another day as creatinine trending up.  Hopefully can be discharged in 1 to 2 days once SNF arranged and cleared by nephrology.  .  Patient lives in assisted living in the setting of multiple comorbidities, patient's age, and recurrent admission, recommended LTC, patient considering LTC going forward.  Expected Discharge Date: 9/1/2024; Expected Discharge Time:        Copied text in this note has been reviewed and is accurate as of 09/03/24.         Dictated utilizing Dragon dictation        Balbina Painting MD  [facility]  09/03/24  09:40 EDT

## 2024-09-03 NOTE — PLAN OF CARE
Goal Outcome Evaluation:  Plan of Care Reviewed With: (P) patient        Progress: (P) improving  Outcome Evaluation: (P) Pt seen for PT this PM. sat EOB w mod A x1 and VC for repositioning/hand placement. demo'd STS from EOb w mod A of 2 and mild unsteadiness w transfer. amb 20' in room w rwx and min A x2 - frequent VC/TC for AD mgmt and direction changes, notable fwd flexed posture and slow pace throughout amb and STS transfer despite VC to correct. UIC at end of session w all needs met. continues to benefit from skilled PT to address mobility deficits. recommend d/c SNU.      Anticipated Discharge Disposition (PT): (P) skilled nursing facility

## 2024-09-03 NOTE — PLAN OF CARE
Goal Outcome Evaluation:  Plan of Care Reviewed With: patient        Progress: improving  Outcome Evaluation: Pt apparently had difficulty bearing weight on her legs trying to get back in bed from chair. Assistant said she really needed 2 people. Bed alarm for safety. Purewick in place at this time secondary to poor ability getting up and large amts of diuresis from diuretics. She continues on fld restriction. Afebrile and VS stable. Continue to monitor blood sugars. She had some bleeding from right leg wound again. Dsg changed.

## 2024-09-03 NOTE — CASE MANAGEMENT/SOCIAL WORK
Continued Stay Note  [facility]     Patient Name: Halie Guidry  MRN: [id number]  Today's Date: 9/3/2024    Admit Date: 8/24/2024    Plan: Short term SNF stay pending precert and accepting facility.   Discharge Plan       Row Name 09/03/24 1521       Plan    Plan Short term SNF stay pending precert and accepting facility.    Plan Comments Spoke with son Derrick, regarding therapy recommendations for SNF stay. 1 . [facility] - message [facility]. 2. [facility]- left message [facility]. 3. [facility].  Patient may need transport. Patient will need precert once accepting facility. Will continue to monitor for new or changing discharge needs. Michelle OSBORNE RN CCP                   Discharge Codes    No documentation.                 Expected Discharge Date and Time       Expected Discharge Date Expected Discharge Time    Sep 4, 2024               Michelle Pearson RN

## 2024-09-03 NOTE — PLAN OF CARE
Goal Outcome Evaluation:              Outcome Evaluation: VSS. Hydralizine held this afternoon. Up to chair with assist. Dressing changed. Mepilex placed on heel. Norco for pain.

## 2024-09-03 NOTE — PROGRESS NOTES
[facility] Progress Note      Patient Name: Halie Guidry  : 1940  MRN: [id number]  Primary Care Physician:  Napoleon Mead MD  Date of admission: 2024    Subjective     Interval History:   Follow-up CKD 4.  1550.  Bed scale weight 211 inconsistent.  Now on fluid restriction.  Her legs are much less swollen but still has significant pain and throbbing.  Does have diabetic neuropathy.  Lyrica dose increased yesterday.  Review of Systems:   As noted above    Objective     Vitals:   Temp:  [97.8 °F (36.6 °C)-98 °F (36.7 °C)] 97.9 °F (36.6 °C)  Heart Rate:  [65-68] 65  Resp:  [16] 16  BP: (114-137)/(59-64) 123/64    Intake/Output Summary (Last 24 hours) at 9/3/2024 0815  Last data filed at 9/3/2024 0554  Gross per 24 hour   Intake 120 ml   Output 1550 ml   Net -1430 ml       Physical Exam:    General Appearance: alert, oriented x 3, no acute distress.  Chronically ill.    Skin: warm and dry  HEENT: oral mucosa normal, nonicteric sclera  Neck: supple, no JVD  Lungs: Clear to auscultation.  No wheezing.  Heart: RRR, normal S1 and S2  Abdomen: soft, nontender, nondistended. +BS  : no palpable bladder  Extremities: 1+ lower extremity edema, softer.  Much improved.  Both legs wrapped.  Neuro: normal speech and mental status     Scheduled Meds:     atorvastatin, 80 mg, Oral, Nightly  bisoprolol, 5 mg, Oral, Daily  bumetanide, 2 mg, Oral, BID  DULoxetine, 30 mg, Oral, Daily  ferrous sulfate, 325 mg, Oral, Every Other Day  [Held by provider] heparin (porcine), 5,000 Units, Subcutaneous, Q12H  hydrALAZINE, 100 mg, Oral, Q8H  hydrocortisone-bacitracin-zinc oxide-nystatin, 1 Application, Topical, BID  insulin glargine, 70 Units, Subcutaneous, Nightly  insulin lispro, 12 Units, Subcutaneous, TID AC  insulin lispro, 3-14 Units, Subcutaneous, 4x Daily AC & at Bedtime  lactobacillus acidophilus, 1 capsule, Oral, Daily  levothyroxine, 137 mcg, Oral, Q AM  melatonin, 2.5 mg, Oral,  Nightly  mupirocin, 1 Application, Topical, Daily  pantoprazole, 40 mg, Oral, Q AM  pregabalin, 75 mg, Oral, BID  sodium chloride, 3 mL, Intravenous, Q12H  spironolactone, 50 mg, Oral, Daily  terazosin, 1 mg, Oral, Nightly      IV Meds:          Results Reviewed:   I have personally reviewed the results from the time of this admission to 9/3/2024 08:15 EDT     Results from last 7 days   Lab Units 09/03/24  0533 09/02/24  0551 09/01/24  0533   SODIUM mmol/L 135* 134* 136   POTASSIUM mmol/L 4.5 4.5 4.3   CHLORIDE mmol/L 98 99 101   CO2 mmol/L 21.0* 20.4* 21.0*   BUN mg/dL 74* 64* 63*   CREATININE mg/dL 2.76* 2.44* 2.47*   CALCIUM mg/dL 8.9 8.6 8.4*   GLUCOSE mg/dL 173* 245* 259*       Estimated Creatinine Clearance: 18.4 mL/min (A) (by C-G formula based on SCr of 2.76 mg/dL (H)).    Results from last 7 days   Lab Units 09/03/24  0533 09/02/24  0551 09/01/24  0533   MAGNESIUM mg/dL 2.4 2.1 2.2   PHOSPHORUS mg/dL 6.0* 5.2* 5.4*             Results from last 7 days   Lab Units 09/03/24  0533 09/02/24  0551 09/01/24  0533 08/31/24  0436 08/30/24  0310   WBC 10*3/mm3 10.79 12.43* 12.51* 12.35* 11.82*   HEMOGLOBIN g/dL 9.1* 9.4* 9.2* 10.0* 9.8*   PLATELETS 10*3/mm3 348 376 343 375 365             Assessment / Plan     ASSESSMENT:  CKD 4 baseline creatinine 2.  Chronic cardiorenal syndrome.  Waste products starting to rise. FIne line for her with intravascular versus extravascular volume excess.  Edema  likely due to nephrotic proteinuria as well as HFpEF so likely cannot get rid of all edema without intravasc volume depletion .  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  Anti-PLA2R, cryoglobulin pending from 8/30/2024 .  She is not a candidate for renal biopsy.  2.  Heart failure preserved ejection fraction diuresing on po Bumex and oral Aldactone.  1 dose of IV Lasix also given yesterday.  3.  Right lower extremity cellulitis completed antibiotics.  4.  Hypertension.  Controlled.  5.   Anemia , iron deficient.   6.  Diabetes mellitus type II.  7.  Hypothyroid inadequately replaced.  Likely adding to her heart failure decompensation.  Supplement increased this admission .  PLAN:  Continue p.o. Bumex today.  Potential discharge tomorrow if creatinine stable.  Discussed with Dr. Painting.    Thank you for involving us in the care of Halie Guidry.  Please feel free to call with any questions.    Mary Rice MD  09/03/24  08:15 EDT    [facility]  [phone]    Please note that portions of this note were completed with a voice recognition program.

## 2024-09-03 NOTE — CASE MANAGEMENT/SOCIAL WORK
Continued Stay Note  [facility]     Patient Name: Halie Guidry  MRN: [id number]  Today's Date: 9/3/2024    Admit Date: 8/24/2024    Plan: [facility] accepted - precert initiated today.   Discharge Plan       Row Name 09/03/24 1600       Plan    Plan [facility] accepted - precert initiated today.    Plan Comments Received call from [facility], shira accept at [facility]. Precert started. Will continue to monitor for new or changing discharge needs. Michelle OSBORNE RN CCP      Row Name 09/03/24 1521       Plan    Plan Short term SNF stay pending precert and accepting facility.    Plan Comments Spoke with son Derrick, regarding therapy recommendations for SNF stay. 1 . [facility] - message [facility]. [facility]. 3. [facility].  Patient may need transport. Patient will need precert once accepting facility. Will continue to monitor for new or changing discharge needs. Michelle OSBORNE RN CCP                   Discharge Codes    No documentation.                 Expected Discharge Date and Time       Expected Discharge Date Expected Discharge Time    Sep 4, 2024               Michelle Pearson RN

## 2024-09-03 NOTE — THERAPY PROGRESS REPORT/RE-CERT
Patient Name: Halie Guidry  : 1940    MRN: [id number]                              Today's Date: 9/3/2024       Admit Date: 2024    Visit Dx:     ICD-10-CM ICD-9-CM   1. Cellulitis of lower extremity, unspecified laterality  L03.119 682.6   2. Bilateral lower extremity edema  R60.0 782.3   3. Chronic kidney disease, unspecified CKD stage  N18.9 585.9   4. History of diabetes mellitus  Z86.39 V12.29     Patient Active Problem List   Diagnosis    Compression fracture    Lumbar degenerative disc disease    Type 2 diabetes mellitus with hyperglycemia, with long-term current use of insulin    Hyperlipidemia    Benign essential hypertension    Primary hypothyroidism    Neuropathy    Osteoporosis    Proteinuria    Tobacco abuse    Generalized weakness    Spinal stenosis    Scoliosis    Arthritis    VBI (vertebrobasilar insufficiency)    Orthostatic hypotension    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    Severe hypothyroidism    Noncompliance with medication regimen    Vitamin D deficiency disease    Tremor    Seizure    Thoracic degenerative disc disease    Hyperglycemia    Type II diabetes mellitus, uncontrolled    Lower abdominal pain    Transaminitis    Emphysematous cystitis    Choledocholithiasis    Hypokalemia    Hypoxia    Generalized abdominal pain    History of Clostridium difficile infection    History of ERCP    Hypomagnesemia    Anxiety disorder    Neuropathic pain    Intertrigo    Weakness of both lower extremities    Accelerated hypertension    Acute cystitis without hematuria    Left lower lobe pneumonia    Cellulitis and abscess of left lower extremity    Benign hypertension with CKD (chronic kidney disease) stage IV    Peripheral edema    Primary malignant neuroendocrine tumor of pancreas    Encounter for long-term (current) use of high-risk medication    Diabetic foot ulcer    Stage 3a chronic kidney disease    Pressure injury of buttock, stage 2    HTN (hypertension)     Anemia due to chronic kidney disease    Bilateral lower extremity edema    Cellulitis of right lower extremity    CKD (chronic kidney disease) stage 4, GFR 15-29 ml/min    Acute CHF    Bilateral cellulitis of lower leg     Past Medical History:   Diagnosis Date    Acute metabolic encephalopathy 06/24/2022    Anxiety     Arthritis     Benign essential hypertension 08/20/2014    Bleeding disorder     Depression     Diabetes     Diabetes mellitus, type 2     Disc degeneration, lumbar     Headache, tension-type     Hyperlipidemia     Hypothyroidism     Neuropathy     Osteoporosis 09/09/2015    Pancreatic mass     Sept 2023, on Monthly Octreotide Injection    Peripheral neuropathy     Rotator cuff tear, left     Scoliosis     Shoulder pain     LEFT, TORN ROTATOR CUFF S/P FALL    Spinal stenosis      Past Surgical History:   Procedure Laterality Date    APPENDECTOMY      BILATERAL BREAST REDUCTION Bilateral 08/2015    CATARACT EXTRACTION  03/2015    CHOLECYSTECTOMY WITH INTRAOPERATIVE CHOLANGIOGRAM N/A 3/27/2022    Procedure: CHOLECYSTECTOMY LAPAROSCOPIC INTRAOPERATIVE CHOLANGIOGRAM;  Surgeon: Aiyana Hill MD;  Location: [facility] MAIN OR;  Service: General;  Laterality: N/A;    COLONOSCOPY  06/05/2015    WNL    ERCP N/A 2/25/2022    Procedure: ENDOSCOPIC RETROGRADE CHOLANGIOPANCREATOGRAPHY with sphincterotomy and balloon sweep;  Surgeon: Chilo Wilhelm MD;  Location: [facility] ENDOSCOPY;  Service: Gastroenterology;  Laterality: N/A;  PRE/POST - CBD stones    ERCP N/A 3/28/2022    Procedure: ENDOSCOPIC RETROGRADE CHOLANGIOPANCREATOGRAPHY WITH SPHINCTEROTOMY AND BALLOON SWEEP;  Surgeon: Chilo Wilhelm MD;  Location: [facility] ENDOSCOPY;  Service: Gastroenterology;  Laterality: N/A;  PRE: COMMON DUCT STONE  POST: COMMON DUCT STONE    KYPHOPLASTY      REDUCTION MAMMAPLASTY      TONSILLECTOMY        General Information       Row Name 09/03/24 1501          Physical Therapy Time and Intention    Document Type  progress note/recertification (P)   -     Mode of Treatment physical therapy (P)   -       Row Name 09/03/24 1501          General Information    Patient Profile Reviewed yes (P)   -LC     Existing Precautions/Restrictions fall (P)   -       Row Name 09/03/24 1501          Cognition    Orientation Status (Cognition) oriented to;place;person (P)   -       Row Name 09/03/24 1501          Safety Issues, Functional Mobility    Impairments Affecting Function (Mobility) balance;coordination;endurance/activity tolerance;sensation/sensory awareness;strength;postural/trunk control (P)   -     Comment, Safety Issues/Impairments (Mobility) gait belt, nonskid socks donned (P)   -               User Key  (r) = Recorded By, (t) = Taken By, (c) = Cosigned By      Initials Name Provider Type     Susannah Buck PT Student PT Student                   Mobility       Row Name 09/03/24 1502          Bed Mobility    Bed Mobility supine-sit (P)   -     Supine-Sit [place] (Bed Mobility) moderate assist (50% patient effort) (P)   -     Assistive Device (Bed Mobility) bed rails;head of bed elevated (P)   -LC     Comment, (Bed Mobility) VC for hand placement and repositioning (P)   -       Row Name 09/03/24 1502          Sit-Stand Transfer    Sit-Stand [place] (Transfers) moderate assist (50% patient effort);2 person assist (P)   -     Assistive Device (Sit-Stand Transfers) walker, front-wheeled (P)   -     Comment, (Sit-Stand Transfer) heavy fwd lean despite frequent VC to stand up straight (P)   -       Row Name 09/03/24 1502          Gait/Stairs (Locomotion)    [place] Level (Gait) minimum assist (75% patient effort);2 person assist (P)   -     Assistive Device (Gait) walker, front-wheeled (P)   -     Distance in Feet (Gait) 20 (P)   -     Deviations/Abnormal Patterns (Gait) tess decreased;festinating/shuffling;stride length decreased;weight shifting decreased;base of support,  narrow;gait speed decreased (P)   -LC     Bilateral Gait Deviations forward flexed posture;heel strike decreased (P)   -LC     Comment, (Gait/Stairs) heavy fwd lean throughout. mild unsteadiness and slow paced. VC/TC for AD mgmt (P)   -LC               User Key  (r) = Recorded By, (t) = Taken By, (c) = Cosigned By      Initials Name Provider Type     Susannah Buck, PT Student PT Student                   Obj/Interventions       Row Name 09/03/24 1505          Balance    Balance Assessment sit to stand dynamic balance;standing dynamic balance;sitting dynamic balance (P)   -LC     Dynamic Sitting Balance standby assist (P)   -LC     Position, Sitting Balance sitting edge of bed (P)   -LC     Sit to Stand Dynamic Balance moderate assist;2-person assist (P)   -LC     Dynamic Standing Balance 2-person assist;minimal assist (P)   -LC     Position/Device Used, Standing Balance walker, front-wheeled (P)   -LC     Comment, Balance fwd flexed posture w STS and ambulation despite VC to correct. mild unsteadiness throughout, no overt LOB noted (P)   -LC               User Key  (r) = Recorded By, (t) = Taken By, (c) = Cosigned By      Initials Name Provider Type     Susannah Buck, PT Student PT Student                   Goals/Plan       Row Name 09/03/24 1511          Bed Mobility Goal 1 (PT)    Activity/Assistive Device (Bed Mobility Goal 1, PT) bed mobility activities, all (P)   -LC     [place] Level/Cues Needed (Bed Mobility Goal 1, PT) contact guard required (P)   -LC     Time Frame (Bed Mobility Goal 1, PT) 1 week (P)   -LC     Progress/Outcomes (Bed Mobility Goal 1, PT) goal revised this date (P)   -LC       Row Name 09/03/24 1511          Transfer Goal 1 (PT)    Activity/Assistive Device (Transfer Goal 1, PT) sit-to-stand/stand-to-sit;bed-to-chair/chair-to-bed;walker, rolling (P)   -LC     [place] Level/Cues Needed (Transfer Goal 1, PT) contact guard required (P)   -LC     Time Frame (Transfer Goal 1,  PT) 1 week (P)   -LC     Progress/Outcome (Transfer Goal 1, PT) goal revised this date (P)   -LC       Row Name 09/03/24 1511          Gait Training Goal 1 (PT)    Activity/Assistive Device (Gait Training Goal 1, PT) gait (walking locomotion);walker, rolling (P)   -LC     [place] Level (Gait Training Goal 1, PT) minimum assist (75% or more patient effort) (P)   -LC     Distance (Gait Training Goal 1, PT) 30 (P)   -LC     Time Frame (Gait Training Goal 1, PT) 1 week (P)   -LC     Progress/Outcome (Gait Training Goal 1, PT) goal revised this date (P)   -LC               User Key  (r) = Recorded By, (t) = Taken By, (c) = Cosigned By      Initials Name Provider Type    Susannah Lugo, PT Student PT Student                   Clinical Impression       Row Name 09/03/24 1503          Pain    Pretreatment Pain Rating 0/10 - no pain (P)   -LC     Posttreatment Pain Rating 7/10 (P)   -LC     Pain Location anterior (P)   -LC     Pain Location - abdomen (P)   -LC     Pre/Posttreatment Pain Comment reported abd pain, but does not limit mobility (P)   -LC     Pain Intervention(s) Repositioned (P)   -LC       Row Name 09/03/24 1506          Plan of Care Review    Plan of Care Reviewed With patient (P)   -LC     Progress improving (P)   -LC     Outcome Evaluation Pt seen for PT this PM. sat EOB w mod A x1 and VC for repositioning/hand placement. demo'd STS from EOb w mod A of 2 and mild unsteadiness w transfer. amb 20' in room w rwx and min A x2 - frequent VC/TC for AD mgmt and direction changes, notable fwd flexed posture and slow pace throughout amb and STS transfer despite VC to correct. UIC at end of session w all needs met. continues to benefit from skilled PT to address mobility deficits. recommend d/c SNU. (P)   -LC       Row Name 09/03/24 2669          Therapy Assessment/Plan (PT)    Therapy Frequency (PT) 5 times/wk (P)   -LC     Predicted Duration of Therapy Intervention (PT) 1 week (P)   -       Row Name  09/03/24 1506          Positioning and Restraints    Pre-Treatment Position in bed (P)   -LC     Post Treatment Position chair (P)   -LC     In Chair reclined;call light within reach;encouraged to call for assist;exit alarm on (P)   -LC               User Key  (r) = Recorded By, (t) = Taken By, (c) = Cosigned By      Initials Name Provider Type    Susannah Lugo, PT Student PT Student                   Outcome Measures       Row Name 09/03/24 1512 09/03/24 0903       How much help from another person do you currently need...    Turning from your back to your side while in flat bed without using bedrails? 3 (P)   -LC 3  -CD    Moving from lying on back to sitting on the side of a flat bed without bedrails? 3 (P)   -LC 3  -CD    Moving to and from a bed to a chair (including a wheelchair)? 3 (P)   -LC 3  -CD    Standing up from a chair using your arms (e.g., wheelchair, bedside chair)? 2 (P)   -LC 2  -CD    Climbing 3-5 steps with a railing? 1 (P)   -LC 1  -CD    To walk in hospital room? 3 (P)   -LC 3  -CD    AM-PAC 6 Clicks Score (PT) 15 (P)   -LC 15  -CD    Highest Level of Mobility Goal 4 --> Transfer to chair/commode (P)   -LC 4 --> Transfer to chair/commode  -CD      Row Name 09/03/24 1512          Functional Assessment    Outcome Measure Options AM-PAC 6 Clicks Basic Mobility (PT) (P)   -               User Key  (r) = Recorded By, (t) = Taken By, (c) = Cosigned By      Initials Name Provider Type    Dia Randall, RN Registered Nurse    Susannah Lugo, PT Student PT Student                                 Physical Therapy Education       Title: PT OT SLP Therapies (Done)       Topic: Physical Therapy (Done)       Point: Mobility training (Done)       Learning Progress Summary             Patient Acceptance, E, VU by NICOLAS at 9/3/2024 1513    Acceptance, E,TB,D, VU,NR by JOSE ALBERTO at 9/2/2024 1726    Acceptance, E,TB, VU,NR by SUSAN at 8/30/2024 1559    Acceptance, E,TB, VU,NR by SUSAN at 8/29/2024 1440     Acceptance, E, VU,NR by RC at 8/29/2024 0428    Acceptance, E,TB,D, VU,NR by JOSE ALBERTO at 8/28/2024 1631    Acceptance, E, VU by ESPINOZA at 8/25/2024 1435                         Point: Home exercise program (Done)       Learning Progress Summary             Patient Acceptance, E,TB,D, VU,NR by JOSE ALBERTO at 9/2/2024 1726    Acceptance, E,TB, VU,NR by CB at 8/29/2024 1440    Acceptance, E, VU,NR by RC at 8/29/2024 0428    Acceptance, E,TB,D, VU,NR by  at 8/28/2024 1631                         Point: Body mechanics (Done)       Learning Progress Summary             Patient Acceptance, E, VU by NICOLAS at 9/3/2024 1513    Acceptance, E,TB,D, VU,NR by JM at 9/2/2024 1726    Acceptance, E,TB, VU,NR by CB at 8/30/2024 1559    Acceptance, E,TB, VU,NR by CB at 8/29/2024 1440    Acceptance, E, VU,NR by RC at 8/29/2024 0428    Acceptance, E,TB,D, VU,NR by  at 8/28/2024 1631    Acceptance, E, VU by ESPINOZA at 8/25/2024 1435                         Point: Precautions (Done)       Learning Progress Summary             Patient Acceptance, E,TB,D, VU,NR by  at 9/2/2024 1726    Acceptance, E,TB, VU,NR by CB at 8/30/2024 1559    Acceptance, E,TB, VU,NR by CB at 8/29/2024 1440    Acceptance, E, VU,NR by  at 8/29/2024 0428    Acceptance, E,TB,D, VU,NR by  at 8/28/2024 1631    Acceptance, E, VU by ESPINOZA at 8/25/2024 1435                                         User Key       Initials Effective Dates Name Provider Type Discipline    JOSE ALBERTO 03/07/18 -  Beena Ray, PTA Physical Therapist Assistant PT    CB 10/22/21 -  Manisha Su, PT Physical Therapist PT    JL 01/16/24 -  Kenya Ruiz, PT Physical Therapist PT    RC 06/25/24 -  Josephine Puckett, RN Registered Nurse Nurse     07/30/24 -  Susannah Buck, PT Student PT Student PT                  PT Recommendation and Plan     Plan of Care Reviewed With: (P) patient  Progress: (P) improving  Outcome Evaluation: (P) Pt seen for PT this PM. sat EOB w mod A x1 and VC for repositioning/hand  placement. demo'd STS from EOb w mod A of 2 and mild unsteadiness w transfer. amb 20' in room w rwx and min A x2 - frequent VC/TC for AD mgmt and direction changes, notable fwd flexed posture and slow pace throughout amb and STS transfer despite VC to correct. UIC at end of session w all needs met. continues to benefit from skilled PT to address mobility deficits. recommend d/c SNU.     Time Calculation:         PT Charges       Row Name 09/03/24 1513             Time Calculation    Start Time 1430 (P)   -LC      Stop Time 1445 (P)   -LC      Time Calculation (min) 15 min (P)   -LC      PT Received On 09/03/24 (P)   -LC      PT - Next Appointment 09/04/24 (P)   -      PT Goal Re-Cert Due Date 09/10/24 (P)   -LC         Time Calculation- PT    Total Timed Code Minutes- PT 15 minute(s) (P)   -                User Key  (r) = Recorded By, (t) = Taken By, (c) = Cosigned By      Initials Name Provider Type     Susannah Buck, PT Student PT Student                  Therapy Charges for Today       Code Description Service Date Service Provider Modifiers Qty    [id number]  PT THERAPEUTIC ACT EA 15 MIN 9/3/2024 Susannah Buck, PT Student GP 1    [id number]  PT THER SUPP EA 15 MIN 9/3/2024 Susannah Buck, PT Student GP 1            PT G-Codes  Outcome Measure Options: (P) AM-PAC 6 Clicks Basic Mobility (PT)  AM-PAC 6 Clicks Score (PT): (P) 15  AM-PAC 6 Clicks Score (OT): 16  PT Discharge Summary  Anticipated Discharge Disposition (PT): (P) skilled nursing facility    Susannah Buck PT Student  9/3/2024

## 2024-09-03 NOTE — CASE MANAGEMENT/SOCIAL WORK
Post-Acute Authorization Submission      Post Acute Pre-Cert Documentation  Request Submitted by Facility - Type:: Hospital  Post-Acute Authorization Type Submitted:: SNF  Date Post Acute Pre-Cert Inititated per Facility: 09/03/24  Accepting Facility: [facility] Discharge Date Requested: 09/04/24  All Clinicals Submitted?: Yes  Had Accepting Facility at Time of Submission: Yes  Response Communicated to:: , Accepting Facility Liaison  Authorization Number:: [id number]              JESSICA Hernandes

## 2024-09-04 VITALS
TEMPERATURE: 97.4 F | SYSTOLIC BLOOD PRESSURE: 113 MMHG | RESPIRATION RATE: 16 BRPM | BODY MASS INDEX: 32.31 KG/M2 | DIASTOLIC BLOOD PRESSURE: 56 MMHG | HEART RATE: 62 BPM | OXYGEN SATURATION: 95 % | WEIGHT: 213.19 LBS | HEIGHT: 68 IN

## 2024-09-04 LAB
ALBUMIN SERPL-MCNC: 3.3 G/DL (ref 3.5–5.2)
ANION GAP SERPL CALCULATED.3IONS-SCNC: 16.2 MMOL/L (ref 5–15)
BUN SERPL-MCNC: 73 MG/DL (ref 8–23)
BUN/CREAT SERPL: 27.9 (ref 7–25)
CALCIUM SPEC-SCNC: 8.8 MG/DL (ref 8.6–10.5)
CHLORIDE SERPL-SCNC: 101 MMOL/L (ref 98–107)
CO2 SERPL-SCNC: 19.8 MMOL/L (ref 22–29)
CREAT SERPL-MCNC: 2.62 MG/DL (ref 0.57–1)
DEPRECATED RDW RBC AUTO: 46.3 FL (ref 37–54)
EGFRCR SERPLBLD CKD-EPI 2021: 17.5 ML/MIN/1.73
ERYTHROCYTE [DISTWIDTH] IN BLOOD BY AUTOMATED COUNT: 15.5 % (ref 12.3–15.4)
GLUCOSE BLDC GLUCOMTR-MCNC: 185 MG/DL (ref 70–130)
GLUCOSE BLDC GLUCOMTR-MCNC: 225 MG/DL (ref 70–130)
GLUCOSE SERPL-MCNC: 160 MG/DL (ref 65–99)
HCT VFR BLD AUTO: 29 % (ref 34–46.6)
HGB BLD-MCNC: 9.6 G/DL (ref 12–15.9)
MCH RBC QN AUTO: 27.7 PG (ref 26.6–33)
MCHC RBC AUTO-ENTMCNC: 33.1 G/DL (ref 31.5–35.7)
MCV RBC AUTO: 83.8 FL (ref 79–97)
PHOSPHATE SERPL-MCNC: 6.8 MG/DL (ref 2.5–4.5)
PLATELET # BLD AUTO: 368 10*3/MM3 (ref 140–450)
PMV BLD AUTO: 9.5 FL (ref 6–12)
POTASSIUM SERPL-SCNC: 4.7 MMOL/L (ref 3.5–5.2)
RBC # BLD AUTO: 3.46 10*6/MM3 (ref 3.77–5.28)
SODIUM SERPL-SCNC: 137 MMOL/L (ref 136–145)
WBC NRBC COR # BLD AUTO: 11.09 10*3/MM3 (ref 3.4–10.8)

## 2024-09-04 PROCEDURE — 82948 REAGENT STRIP/BLOOD GLUCOSE: CPT

## 2024-09-04 PROCEDURE — 63710000001 INSULIN LISPRO (HUMAN) PER 5 UNITS: Performed by: INTERNAL MEDICINE

## 2024-09-04 PROCEDURE — 85027 COMPLETE CBC AUTOMATED: CPT | Performed by: STUDENT IN AN ORGANIZED HEALTH CARE EDUCATION/TRAINING PROGRAM

## 2024-09-04 PROCEDURE — 63710000001 INSULIN LISPRO (HUMAN) PER 5 UNITS: Performed by: STUDENT IN AN ORGANIZED HEALTH CARE EDUCATION/TRAINING PROGRAM

## 2024-09-04 PROCEDURE — 80069 RENAL FUNCTION PANEL: CPT | Performed by: INTERNAL MEDICINE

## 2024-09-04 RX ORDER — SPIRONOLACTONE 50 MG/1
50 TABLET, FILM COATED ORAL DAILY
Start: 2024-09-05

## 2024-09-04 RX ORDER — ACETAMINOPHEN 325 MG/1
650 TABLET ORAL EVERY 6 HOURS PRN
Start: 2024-09-04

## 2024-09-04 RX ORDER — HYDRALAZINE HYDROCHLORIDE 50 MG/1
50 TABLET, FILM COATED ORAL EVERY 8 HOURS SCHEDULED
Start: 2024-09-04

## 2024-09-04 RX ORDER — AMOXICILLIN 250 MG
2 CAPSULE ORAL 2 TIMES DAILY PRN
Start: 2024-09-04 | End: 2024-09-10

## 2024-09-04 RX ORDER — FERROUS SULFATE 325(65) MG
325 TABLET ORAL EVERY OTHER DAY
Start: 2024-09-06

## 2024-09-04 RX ORDER — BUMETANIDE 2 MG/1
2 TABLET ORAL 2 TIMES DAILY
Start: 2024-09-04

## 2024-09-04 RX ORDER — HYDRALAZINE HYDROCHLORIDE 50 MG/1
50 TABLET, FILM COATED ORAL EVERY 8 HOURS SCHEDULED
Status: DISCONTINUED | OUTPATIENT
Start: 2024-09-04 | End: 2024-09-04 | Stop reason: HOSPADM

## 2024-09-04 RX ORDER — TERAZOSIN 1 MG/1
1 CAPSULE ORAL NIGHTLY
Start: 2024-09-04

## 2024-09-04 RX ORDER — PREGABALIN 75 MG/1
75 CAPSULE ORAL 2 TIMES DAILY
Qty: 6 CAPSULE | Refills: 0 | Status: SHIPPED | OUTPATIENT
Start: 2024-09-04 | End: 2024-09-07

## 2024-09-04 RX ORDER — LEVOTHYROXINE SODIUM 137 UG/1
137 TABLET ORAL
Start: 2024-09-05

## 2024-09-04 RX ADMIN — INSULIN LISPRO 12 UNITS: 100 INJECTION, SOLUTION INTRAVENOUS; SUBCUTANEOUS at 08:16

## 2024-09-04 RX ADMIN — MUPIROCIN 1 APPLICATION: 20 OINTMENT TOPICAL at 11:46

## 2024-09-04 RX ADMIN — Medication 1 APPLICATION: at 11:47

## 2024-09-04 RX ADMIN — BISOPROLOL FUMARATE 5 MG: 5 TABLET, FILM COATED ORAL at 08:17

## 2024-09-04 RX ADMIN — BUMETANIDE 2 MG: 2 TABLET ORAL at 08:17

## 2024-09-04 RX ADMIN — SPIRONOLACTONE 50 MG: 50 TABLET ORAL at 08:17

## 2024-09-04 RX ADMIN — HYDROCODONE BITARTRATE AND ACETAMINOPHEN 1 TABLET: 5; 325 TABLET ORAL at 05:34

## 2024-09-04 RX ADMIN — HYDRALAZINE HYDROCHLORIDE 100 MG: 50 TABLET ORAL at 05:28

## 2024-09-04 RX ADMIN — DULOXETINE HYDROCHLORIDE 30 MG: 30 CAPSULE, DELAYED RELEASE ORAL at 08:17

## 2024-09-04 RX ADMIN — INSULIN LISPRO 5 UNITS: 100 INJECTION, SOLUTION INTRAVENOUS; SUBCUTANEOUS at 12:58

## 2024-09-04 RX ADMIN — FERROUS SULFATE TAB 325 MG (65 MG ELEMENTAL FE) 325 MG: 325 (65 FE) TAB at 08:16

## 2024-09-04 RX ADMIN — Medication 1 CAPSULE: at 08:17

## 2024-09-04 RX ADMIN — LEVOTHYROXINE SODIUM 137 MCG: 137 TABLET ORAL at 05:28

## 2024-09-04 RX ADMIN — PREGABALIN 75 MG: 75 CAPSULE ORAL at 08:16

## 2024-09-04 RX ADMIN — INSULIN LISPRO 12 UNITS: 100 INJECTION, SOLUTION INTRAVENOUS; SUBCUTANEOUS at 12:57

## 2024-09-04 RX ADMIN — Medication 3 ML: at 08:19

## 2024-09-04 RX ADMIN — PANTOPRAZOLE SODIUM 40 MG: 40 TABLET, DELAYED RELEASE ORAL at 05:28

## 2024-09-04 RX ADMIN — INSULIN LISPRO 3 UNITS: 100 INJECTION, SOLUTION INTRAVENOUS; SUBCUTANEOUS at 08:17

## 2024-09-04 NOTE — CASE MANAGEMENT/SOCIAL WORK
Post-Acute Authorization Submission      Post Acute Pre-Cert Documentation  Request Submitted by Facility - Type:: Hospital  Post-Acute Authorization Type Submitted:: SNF  Date Post Acute Pre-Cert Inititated per Facility: 09/03/24  Date Post Acute Pre-Cert Completed: 09/04/24  Accepting Facility: [facility] Discharge Date Requested: 09/04/24  All Clinicals Submitted?: Yes  Had Accepting Facility at Time of Submission: Yes  Response Received from Insurance?: Approval  Response Communicated to:: , Accepting Facility Liaison, Accepting Facility Auth Department  Authorization Number:: APPROVED [id number]  Post Acute Pre-Cert Initiated Comment: VALID TO ADMIT [facility] 9/7/2024.              Matt Moran, PCT

## 2024-09-04 NOTE — CASE MANAGEMENT/SOCIAL WORK
Continued Stay Note  [facility]     Patient Name: Halie Guidry  MRN: [id number]  Today's Date: 9/4/2024    Admit Date: 8/24/2024    Plan: [facility], pre-cert approved, son to transport   Discharge Plan       Row Name 09/04/24 1402       Plan    Plan [facility], pre-cert approved, son to transport    Patient/Family in Agreement with Plan yes    Plan Comments Pre-cert approved for [facility], per [facility] bed available today. CCP spoke to the patient's son Derrick who states he can transport the patient. CCP left a voicemail for [facility] regarding discharge/transportation for the patient today. CCP notified the patient of discharge plans and her son to transport. CCP to follow. SAFIA KAUFMAN.      Row Name 09/04/24 1348       Plan    Plan SNF. pre-cert approved and bed available 9/4. Family will transport.    Patient/Family in Agreement with Plan yes                   Discharge Codes    No documentation.                 Expected Discharge Date and Time       Expected Discharge Date Expected Discharge Time    Sep 4, 2024

## 2024-09-04 NOTE — PROGRESS NOTES
Name: Halie Guidry ADMIT: 2024   : 1940  PCP: Napoleon Mead MD    MRN: [id number] LOS: 10 days   AGE/SEX: 84 y.o. female  ROOM: [facility]     Subjective   Subjective   No new complaints.  Pain is stable.  Pending placement to rehab.    Review of Systems   As above  Objective   Objective   Vital Signs  Temp:  [96.8 °F (36 °C)-97.6 °F (36.4 °C)] 97.4 °F (36.3 °C)  Heart Rate:  [54-65] 65  Resp:  [16-18] 18  BP: (110-161)/(50-71) 115/60  SpO2:  [92 %-99 %] 94 %  on   ;   Device (Oxygen Therapy): room air  Body mass index is 32.41 kg/m².  Physical Exam    General: Alert, laying in bed, not in distress,  HEENT: Normocephalic, atraumatic  CV: Regular rate and rhythm, no murmurs rubs or gallops  Lungs: Clear to auscultation bilaterally, no crackles or wheezes  Abdomen: Soft, nontender, nondistended  Extremities: Bilateral lower extremities 1+ edema, no cyanosis      Results Review     I reviewed the patient's new clinical results.  Results from last 7 days   Lab Units 2451 24  0533   WBC 10*3/mm3 11.09* 10.79 12.43* 12.51*   HEMOGLOBIN g/dL 9.6* 9.1* 9.4* 9.2*   PLATELETS 10*3/mm3 368 348 376 343     Results from last 7 days   Lab Units 24  0551 24  0533   SODIUM mmol/L 137 135* 134* 136   POTASSIUM mmol/L 4.7 4.5 4.5 4.3   CHLORIDE mmol/L 101 98 99 101   CO2 mmol/L 19.8* 21.0* 20.4* 21.0*   BUN mg/dL 73* 74* 64* 63*   CREATININE mg/dL 2.62* 2.76* 2.44* 2.47*   GLUCOSE mg/dL 160* 173* 245* 259*   Estimated Creatinine Clearance: 19.4 mL/min (A) (by C-G formula based on SCr of 2.62 mg/dL (H)).  Results from last 7 days   Lab Units 24  0551 24  0533   ALBUMIN g/dL 3.3* 3.4* 3.3* 3.3*     Results from last 7 days   Lab Units 24  0551 24  0533 24  0436   CALCIUM mg/dL 8.8 8.9 8.6 8.4* 8.5*   ALBUMIN g/dL 3.3* 3.4* 3.3* 3.3* 3.2*    MAGNESIUM mg/dL  --  2.4 2.1 2.2 1.7   PHOSPHORUS mg/dL 6.8* 6.0* 5.2* 5.4* 5.5*           COVID19   Date Value Ref Range Status   01/27/2024 Not Detected Not Detected - Ref. Range Final   01/09/2024 Detected (C) Not Detected - Ref. Range Final     Glucose   Date/Time Value Ref Range Status   09/04/2024 1159 225 (H) 70 - 130 mg/dL Final   09/04/2024 0607 185 (H) 70 - 130 mg/dL Final   09/03/2024 2101 172 (H) 70 - 130 mg/dL Final   09/03/2024 1635 96 70 - 130 mg/dL Final   09/03/2024 1149 219 (H) 70 - 130 mg/dL Final   09/03/2024 0607 195 (H) 70 - 130 mg/dL Final   09/02/2024 2049 177 (H) 70 - 130 mg/dL Final           XR Chest PA & Lateral  Narrative: CHEST: 2 VIEWS     HISTORY: Dyspnea     COMPARISON: AP chest 01/27/2024, 01/09/2024, AP chest 05/13/2023.     FINDINGS: Heart size is mildly enlarged. There is increased linear  opacity in the left lung base that is most likely subsegmental  atelectasis but could be related to scarring. There is a calcified  nodule in the right midlung. Lung volumes are diminished. There is no  evidence for pulmonary edema. There is healed or healing right lateral  rib fracture with adjacent pleural thickening and this fracture was  demonstrated on previous PET/CT 06/17/2024.      Impression: Diminished lung volumes with left basilar subsegmental  atelectasis or scarring.     This report was finalized on 8/26/2024 6:27 PM by Blake Murphy M.D  on Workstation: [id number]     Adult Transthoracic Echo Complete W/ Cont if Necessary Per Protocol    Left ventricular systolic function is normal. Calculated left   ventricular EF = 61.8% Normal left ventricular cavity size noted. Left   ventricular wall thickness is consistent with moderate concentric   hypertrophy. All left ventricular wall segments contract normally. Left   ventricular diastolic function was normal.    The left atrial cavity is moderately dilated.    There is moderate calcification of the aortic valve. The aortic valve    appears trileaflet. Trace aortic valve regurgitation is present. No aortic   valve stenosis is present.    Moderate mitral annular calcification is present. Mild mitral valve   regurgitation is present with multiple jets noted. No significant mitral   valve stenosis is present.    Scheduled Medications  atorvastatin, 80 mg, Oral, Nightly  bisoprolol, 5 mg, Oral, Daily  bumetanide, 2 mg, Oral, BID  DULoxetine, 30 mg, Oral, Daily  ferrous sulfate, 325 mg, Oral, Every Other Day  [Held by provider] heparin (porcine), 5,000 Units, Subcutaneous, Q12H  hydrALAZINE, 50 mg, Oral, Q8H  hydrocortisone-bacitracin-zinc oxide-nystatin, 1 Application, Topical, BID  insulin glargine, 70 Units, Subcutaneous, Nightly  insulin lispro, 12 Units, Subcutaneous, TID AC  insulin lispro, 3-14 Units, Subcutaneous, 4x Daily AC & at Bedtime  lactobacillus acidophilus, 1 capsule, Oral, Daily  levothyroxine, 137 mcg, Oral, Q AM  melatonin, 2.5 mg, Oral, Nightly  mupirocin, 1 Application, Topical, Daily  pantoprazole, 40 mg, Oral, Q AM  pregabalin, 75 mg, Oral, BID  sodium chloride, 3 mL, Intravenous, Q12H  spironolactone, 50 mg, Oral, Daily  terazosin, 1 mg, Oral, Nightly    Infusions   Diet  Diet: Diabetic, Cardiac, Fluid Restriction (240 mL/tray); Healthy Heart (2-3 Na+); Consistent Carbohydrate; 1500 mL/day; Fluid Consistency: Thin (IDDSI 0)    I have personally reviewed     [x]  Laboratory   []  Microbiology   []  Radiology   []  EKG/Telemetry  []  Cardiology/Vascular   []  Pathology    []  Records       Assessment/Plan     Active Hospital Problems    Diagnosis  POA    **Cellulitis of right lower extremity [L03.115]  Yes    Bilateral cellulitis of lower leg [L03.116, L03.115]  Yes    Bilateral lower extremity edema [R60.0]  Yes    CKD (chronic kidney disease) stage 4, GFR 15-29 ml/min [N18.4]  Yes    Acute CHF [I50.9]  Yes    Anemia due to chronic kidney disease [N18.9, D63.1]  Yes    Primary malignant neuroendocrine tumor of pancreas  [C7A.8]  Yes    Peripheral edema [R60.0]  Yes    Accelerated hypertension [I10]  Yes    Generalized weakness [R53.1]  Yes    Hyperlipidemia [E78.5]  Yes    Type 2 diabetes mellitus with hyperglycemia, with long-term current use of insulin [E11.65, Z79.4]  Not Applicable    Neuropathy [G62.9]  Yes    Benign essential hypertension [I10]  Yes      Resolved Hospital Problems   No resolved problems to display.       84-year-old female presents the hospital with concern for leg cellulitis right greater than left with multiple leg wounds.     Cellulitis of the leg right greater than left with bilateral leg wounds:  -Initially patient had some wound drainage concerning for impetigo no no more drainage noted  -Wound care  -swelling improved, erythema remains although improved compared to admission.  Likely due to stress dermatitis.  -ID evaluated, antibiotics continued.  -WBC normalized.     Acute CHF, anasarca:  -Echocardiogram 08/26/2024 EF of 61.8%,  -Nephrology managing diuresis,  on po Bumex,        CKD 4:  -Protein to creatinine ratio 7.974, nephrotic range, likely secondary to diabetes  -Creatinine improved, stable.  Monitor daily BMP.  -Nephrology managing, on p.o. Bumex     Anemia  -Chart review iron panel last year was consistent with iron deficient anemia  -Repeat iron panel 08/26/2024 mixed, consistent with anemia of chronic disease and iron deficiency anemia  -Initiated on p.o. iron every other day  -Hemoglobin  fluctuating but stable.  Monitor.     Type 2 diabetes:   -A1c 7.9.  Monitor blood sugar and adjust insulin as needed.    -Stable on current regimen, continue       Essential hypertension:   Improved.  Continue current regimen.       Neuropathy:  Continue Lyrica     Neuroendocrine tumor:   management currently with oncology.     Hyperlipidemia: Continue statin.  Stable.     Hypokalemia  -WNL     Hypothyroidism  -TSH elevated at 21, free T4 and free T3 low 08/27/2024  -Increase levothyroxine to 137 mcg  daily from 112 mcg daily  -Will need repeat labs in 4 weeks      SCDs DVT prophylaxis.  Hold heparin due to bleeding wounds.  CODE STATUS: DNR/DNI  Discussed with patient.  Discussed with Nephrology  Disposition, SNF, once arranged.  Stable to be discharged from medical standpoint.  .  Patient lives in assisted living in the setting of multiple comorbidities, patient's age, and recurrent admission, recommended LTC, patient considering LTC going forward.  Expected Discharge Date: 9/5/2024; Expected Discharge Time:        Copied text in this note has been reviewed and is accurate as of 09/04/24.         Dictated utilizing Dragon dictation        Balbina Painting MD  [facility]  09/04/24  12:35 EDT

## 2024-09-04 NOTE — DISCHARGE SUMMARY
Patient Name: Halie Guidry  : 1940  MRN: [id number]    Date of Admission: 2024  Date of Discharge:  2024  Primary Care Physician: Napoleon Mead MD      Chief Complaint:   Leg Swelling      Discharge Diagnoses     Active Hospital Problems    Diagnosis  POA    **Cellulitis of right lower extremity [L03.115]  Yes    Bilateral cellulitis of lower leg [L03.116, L03.115]  Yes    Bilateral lower extremity edema [R60.0]  Yes    CKD (chronic kidney disease) stage 4, GFR 15-29 ml/min [N18.4]  Yes    Acute CHF [I50.9]  Yes    Anemia due to chronic kidney disease [N18.9, D63.1]  Yes    Primary malignant neuroendocrine tumor of pancreas [C7A.8]  Yes    Peripheral edema [R60.0]  Yes    Accelerated hypertension [I10]  Yes    Generalized weakness [R53.1]  Yes    Hyperlipidemia [E78.5]  Yes    Type 2 diabetes mellitus with hyperglycemia, with long-term current use of insulin [E11.65, Z79.4]  Not Applicable    Neuropathy [G62.9]  Yes    Benign essential hypertension [I10]  Yes      Resolved Hospital Problems   No resolved problems to display.        Hospital Course     84-year-old female presents the hospital with concern for leg cellulitis right greater than left with multiple leg wounds.     Cellulitis of the leg right greater than left with bilateral leg wounds:  Was initiated on IV bank and transitioned to doxycycline, IV cefazolin.  Wound care was consulted.  Swelling and erythema improved, does have residual erythema however felt to be secondary to chronic dermatitis rather than cellulitis.  Infection disease evaluated as WBC was mildly elevated, antibiotics were discontinued and as patient completed 9-day course of antibiotics and there was no signs of ongoing cellulitis.     Acute CHF, anasarca:  -Echocardiogram 2024 EF of 61.8%.  Nephrology was consulted, patient was aggressively treated with IV diuretics, and transitioned to p.o. Bumex 2 mg twice daily.  Continued Bumex discharge, Lasix  was discontinued.      CKD 4:  -Baseline creatinine stable 2.  Protein to creatinine ratio 7.974, nephrotic range, likely secondary to diabetes.  Was treated with aggressive diuretics due to above.  Creatinine trended up, remained stable around primarily 2.4-2.6.  Discharged on p.o. Bumex 2 mg twice daily.  Patient [facility] follow up with [facility] Wed Sept 25 11:45 am   -Creatinine was 2.62 at discharge.  Repeat BMP in 3-5 days to monitor renal function.    Anemia  -Chart review iron panel last year was consistent with iron deficient anemia  -Repeat iron panel 08/26/2024 mixed, consistent with anemia of chronic disease and iron deficiency anemia  -Initiated on p.o. iron every other day, continued at  discharge.  -Hemoglobin was stable around 9.       Type 2 diabetes: On high-dose insulin outpatient, continued to discharge.        Essential hypertension: Blood pressure was elevated on home regimen, BP meds were adjusted per nephrology.  Blood pressure improved, was on the softer side prior to discharge.  Hydralazine dose was decreased.  Continued bisoprolol stable 5 mg daily, hydralazine 50 mg 3 times daily, spironolactone 50 mg daily, terazosin 1 mg nightly.  Monitor BP and adjust medications as indicated.        Neuropathy:Continue Lyrica     Neuroendocrine tumor:   management currently with oncology.     Hyperlipidemia: Continue statin.  Stable.     Hypokalemia  -WNL     HypothyroidismTSH elevated at 21, free T4 and free T3 low 08/27/2024-Increased levothyroxine to 137 mcg daily from 112 mcg daily.Will need repeat labs in 4 weeks    At the time of discharge patient was told to take all medications as prescribed, keep all follow-up appointments, and call their doctor or return to the hospital with any worsening or concerning symptoms.         Day of Discharge     Subjective:  No new complaints. Pain is stable. Pending placement to rehab.     Physical Exam:  Temp:  [96.8 °F (36 °C)-97.6 °F  (36.4 °C)] 97.4 °F (36.3 °C)  Heart Rate:  [54-65] 62  Resp:  [16-18] 16  BP: (110-161)/(50-71) 113/56  Body mass index is 32.41 kg/m².  Physical Exam  General: Alert, laying in bed, not in distress,  HEENT: Normocephalic, atraumatic  CV: Regular rate and rhythm, no murmurs rubs or gallops  Lungs: Clear to auscultation bilaterally, no crackles or wheezes  Abdomen: Soft, nontender, nondistended  Extremities: Bilateral lower extremities 1+ edema, no cyanosis     Consultants     Consult Orders (all) (From admission, onward)       Start     Ordered    09/02/24 0741  Inpatient Infectious Diseases Consult  Once        Specialty:  Infectious Diseases  Provider:  Lei Rodriguez MD    09/02/24 0741    09/01/24 0924  Inpatient Case Management  Consult  Once        Provider:  (Not yet assigned)    09/01/24 0924    08/25/24 0800  Inpatient Spiritual Care Consult  Once        Provider:  (Not yet assigned)    08/24/24 2357    08/25/24 0702  Inpatient Nephrology Consult  IN AM        Specialty:  Nephrology  Provider:  Alon Stock MD    08/24/24 1915    08/24/24 2356  Inpatient Case Management  Consult  Once        Provider:  (Not yet assigned)    08/24/24 2357    08/24/24 2356  Inpatient Nutrition Consult  Once        Provider:  (Not yet assigned)    08/24/24 2357    08/24/24 1842  [facility] (on-call MD unless specified) Details  Once        Specialty:  Hospitalist  Provider:  Kim Barnard MD    08/24/24 1841                  Procedures     * Surgery not found *      Imaging Results (All)       Procedure Component Value Units Date/Time    XR Chest PA & Lateral [[id number]] Collected: 08/26/24 1803     Updated: 08/26/24 1830    Narrative:      CHEST: 2 VIEWS     HISTORY: Dyspnea     COMPARISON: AP chest 01/27/2024, 01/09/2024, AP chest 05/13/2023.     FINDINGS: Heart size is mildly enlarged. There is increased linear  opacity in the left lung base that is most likely  subsegmental  atelectasis but could be related to scarring. There is a calcified  nodule in the right midlung. Lung volumes are diminished. There is no  evidence for pulmonary edema. There is healed or healing right lateral  rib fracture with adjacent pleural thickening and this fracture was  demonstrated on previous PET/CT 06/17/2024.        Impression:      Diminished lung volumes with left basilar subsegmental  atelectasis or scarring.     This report was finalized on 8/26/2024 6:27 PM by Blake Murphy M.D  on Workstation: [id number]             Results for orders placed during the hospital encounter of 11/03/22    Duplex Venous Lower Extremity - Left CAR    Interpretation Summary    Normal left lower extremity venous duplex scan.    Results for orders placed during the hospital encounter of 08/24/24    Adult Transthoracic Echo Complete W/ Cont if Necessary Per Protocol    Interpretation Summary    Left ventricular systolic function is normal. Calculated left ventricular EF = 61.8% Normal left ventricular cavity size noted. Left ventricular wall thickness is consistent with moderate concentric hypertrophy. All left ventricular wall segments contract normally. Left ventricular diastolic function was normal.    The left atrial cavity is moderately dilated.    There is moderate calcification of the aortic valve. The aortic valve appears trileaflet. Trace aortic valve regurgitation is present. No aortic valve stenosis is present.    Moderate mitral annular calcification is present. Mild mitral valve regurgitation is present with multiple jets noted. No significant mitral valve stenosis is present.    Pertinent Labs     Results from last 7 days   Lab Units 09/04/24 0313 09/03/24  0533 09/02/24  0551 09/01/24  0533   WBC 10*3/mm3 11.09* 10.79 12.43* 12.51*   HEMOGLOBIN g/dL 9.6* 9.1* 9.4* 9.2*   PLATELETS 10*3/mm3 368 348 376 343     Results from last 7 days   Lab Units 09/04/24  0313 09/03/24  0533  "09/02/24  0551 09/01/24  0533   SODIUM mmol/L 137 135* 134* 136   POTASSIUM mmol/L 4.7 4.5 4.5 4.3   CHLORIDE mmol/L 101 98 99 101   CO2 mmol/L 19.8* 21.0* 20.4* 21.0*   BUN mg/dL 73* 74* 64* 63*   CREATININE mg/dL 2.62* 2.76* 2.44* 2.47*   GLUCOSE mg/dL 160* 173* 245* 259*   Estimated Creatinine Clearance: 19.4 mL/min (A) (by C-G formula based on SCr of 2.62 mg/dL (H)).  Results from last 7 days   Lab Units 09/04/24 0313 09/03/24 0533 09/02/24 0551 09/01/24  0533   ALBUMIN g/dL 3.3* 3.4* 3.3* 3.3*     Results from last 7 days   Lab Units 09/04/24 0313 09/03/24 0533 09/02/24 0551 09/01/24 0533 08/31/24  0436   CALCIUM mg/dL 8.8 8.9 8.6 8.4* 8.5*   ALBUMIN g/dL 3.3* 3.4* 3.3* 3.3* 3.2*   MAGNESIUM mg/dL  --  2.4 2.1 2.2 1.7   PHOSPHORUS mg/dL 6.8* 6.0* 5.2* 5.4* 5.5*               Invalid input(s): \"LDLCALC\"          Test Results Pending at Discharge     Pending Labs       Order Current Status    Anti-PLA2R In process    Cryoglobulin In process            Discharge Details        Discharge Medications        New Medications        Instructions Start Date   acetaminophen 325 MG tablet  Commonly known as: TYLENOL   650 mg, Oral, Every 6 Hours PRN      bumetanide 2 MG tablet  Commonly known as: BUMEX   2 mg, Oral, 2 Times Daily      ferrous sulfate 325 (65 FE) MG tablet   325 mg, Oral, Every Other Day   Start Date: September 6, 2024     hydrALAZINE 50 MG tablet  Commonly known as: APRESOLINE   50 mg, Oral, Every 8 Hours Scheduled      sennosides-docusate 8.6-50 MG per tablet  Commonly known as: PERICOLACE   2 tablets, Oral, 2 Times Daily PRN      spironolactone 50 MG tablet  Commonly known as: ALDACTONE   50 mg, Oral, Daily   Start Date: September 5, 2024     terazosin 1 MG capsule  Commonly known as: HYTRIN   1 mg, Oral, Nightly             Changes to Medications        Instructions Start Date   levothyroxine 137 MCG tablet  Commonly known as: SYNTHROID, LEVOTHROID  What changed:   medication strength  how much " to take   137 mcg, Oral, Every Early Morning   Start Date: September 5, 2024            Continue These Medications        Instructions Start Date   ammonium lactate 12 % cream  Commonly known as: AMLACTIN   1 Application, Topical, 2 Times Daily      atorvastatin 80 MG tablet  Commonly known as: LIPITOR   80 mg, Oral, Nightly      BD Pen Needle Naila 2nd Gen 32G X 4 MM misc  Generic drug: Insulin Pen Needle   1 each, Other, 4 Times Daily      bisoprolol 5 MG tablet  Commonly known as: ZEBeta   5 mg, Oral, Daily      DULoxetine 30 MG capsule  Commonly known as: CYMBALTA   30 mg, Oral, Daily      hydrocortisone-bacitracin-zinc oxide-nystatin  Commonly known as: MAGIC BARRIER   1 Application, Topical, 2 Times Daily      Insulin Glargine (2 Unit Dial) 300 UNIT/ML solution pen-injector injection  Commonly known as: TOUJEO   90 Units, Subcutaneous, Daily      Insulin Lispro (1 Unit Dial) 100 UNIT/ML solution pen-injector  Commonly known as: HumaLOG KwikPen   12-17 Units, Subcutaneous, 3 Times Daily Before Meals      lansoprazole 30 MG capsule  Commonly known as: PREVACID   30 mg, Oral, Daily      octreotide 30 MG injection  Commonly known as: sandoSTATIN   30 mg, Intramuscular, Every 28 Days, Next dose 9/16/2024      pregabalin 75 MG capsule  Commonly known as: LYRICA   75 mg, Oral, 2 Times Daily             Stop These Medications      amLODIPine 5 MG tablet  Commonly known as: NORVASC     doxazosin 1 MG tablet  Commonly known as: CARDURA     furosemide 20 MG tablet  Commonly known as: LASIX              Allergies   Allergen Reactions    Sulfa Antibiotics Unknown - High Severity     Pt says it was a long time ago and I don't remember but it wasn't good.        Discharge Disposition:  Skilled Nursing Facility (DC - External)      Discharge Diet:  Diet Order   Procedures    Diet: Diabetic, Cardiac, Fluid Restriction (240 mL/tray); Healthy Heart (2-3 Na+); Consistent Carbohydrate; 1500 mL/day; Fluid Consistency: Thin (IDDSI 0)        Discharge Activity:       CODE STATUS:    Code Status and Medical Interventions: No CPR (Do Not Attempt to Resuscitate); Limited Support; No intubation (DNI)   Ordered at: 08/24/24 1921     Medical Intervention Limits:    No intubation (DNI)     Level Of Support Discussed With:    Patient     Code Status (Patient has no pulse and is not breathing):    No CPR (Do Not Attempt to Resuscitate)     Medical Interventions (Patient has pulse or is breathing):    Limited Support       Future Appointments   Date Time Provider Department Center   9/10/2024  2:00 PM Lilia Orona APRN [facility]   9/16/2024  2:10 PM LAB CHAIR 6 [facility]  [facility]   9/16/2024  2:40 PM Merlyn Mon APRN [facility]   9/16/2024  3:00 PM INJECTION CHAIR [facility]      Contact information for follow-up providers       Christina Wells APRN. Go on 9/25/2024.    Specialty: Nurse Practitioner  Why: [facility]-up with [facility] September 25 at 11:45 AM.  Contact information:  [address]  [facility] [address]  [place] [address]  [phone]               Napoleon Mead MD .    Specialty: Family Medicine  Contact information:  [address] [facility] [facility] [address]  [place] [facility] [id number] [phone]                       Contact information for after-discharge care       Destination       [facility] .    Service: Skilled Nursing  Contact information:  [address] [facility] [address] [phone]                                   Time Spent on Discharge:  Greater than 30 minutes      Balbina Painting MD  [facility]  09/04/24  13:57 EDT

## 2024-09-04 NOTE — PROGRESS NOTES
[facility] Progress Note      Patient Name: Halie Guidry  : 1940  MRN: [id number]  Primary Care Physician:  Napoleon Mead MD  Date of admission: 2024    Subjective     Interval History:   Follow-up CKD 4.  Urine not all measured.  Continues on fluid restriction.  Edema better.    Review of Systems:   As noted above    Objective     Vitals:   Temp:  [96.8 °F (36 °C)-97.6 °F (36.4 °C)] 96.8 °F (36 °C)  Heart Rate:  [54-62] 62  Resp:  [16] 16  BP: (110-161)/(50-71) 161/67    Intake/Output Summary (Last 24 hours) at 2024 0915  Last data filed at 2024 0528  Gross per 24 hour   Intake 430 ml   Output 625 ml   Net -195 ml       Physical Exam:    General Appearance:Sleeping, but awakens easily.  Chronically ill.    Skin: warm and dry  HEENT: oral mucosa dry.  nonicteric sclera  Neck: supple, no JVD  Lungs: Clear to auscultation.  No wheezing.  Heart: RRR, normal S1 and S2  Abdomen: soft, nontender, nondistended. +BS  : no palpable bladder  Extremities: 1+ lower extremity edema, softer.  Improving.   Both legs wrapped.  Neuro: normal speech and mental status     Scheduled Meds:     atorvastatin, 80 mg, Oral, Nightly  bisoprolol, 5 mg, Oral, Daily  bumetanide, 2 mg, Oral, BID  DULoxetine, 30 mg, Oral, Daily  ferrous sulfate, 325 mg, Oral, Every Other Day  [Held by provider] heparin (porcine), 5,000 Units, Subcutaneous, Q12H  hydrALAZINE, 50 mg, Oral, Q8H  hydrocortisone-bacitracin-zinc oxide-nystatin, 1 Application, Topical, BID  insulin glargine, 70 Units, Subcutaneous, Nightly  insulin lispro, 12 Units, Subcutaneous, TID AC  insulin lispro, 3-14 Units, Subcutaneous, 4x Daily AC & at Bedtime  lactobacillus acidophilus, 1 capsule, Oral, Daily  levothyroxine, 137 mcg, Oral, Q AM  melatonin, 2.5 mg, Oral, Nightly  mupirocin, 1 Application, Topical, Daily  pantoprazole, 40 mg, Oral, Q AM  pregabalin, 75 mg, Oral, BID  sodium chloride, 3 mL, Intravenous,  Q12H  spironolactone, 50 mg, Oral, Daily  terazosin, 1 mg, Oral, Nightly      IV Meds:          Results Reviewed:   I have personally reviewed the results from the time of this admission to 9/4/2024 09:15 EDT     Results from last 7 days   Lab Units 09/04/24 0313 09/03/24 0533 09/02/24  0551   SODIUM mmol/L 137 135* 134*   POTASSIUM mmol/L 4.7 4.5 4.5   CHLORIDE mmol/L 101 98 99   CO2 mmol/L 19.8* 21.0* 20.4*   BUN mg/dL 73* 74* 64*   CREATININE mg/dL 2.62* 2.76* 2.44*   CALCIUM mg/dL 8.8 8.9 8.6   GLUCOSE mg/dL 160* 173* 245*       Estimated Creatinine Clearance: 19.4 mL/min (A) (by C-G formula based on SCr of 2.62 mg/dL (H)).    Results from last 7 days   Lab Units 09/04/24 0313 09/03/24 0533 09/02/24  0551 09/01/24  0533   MAGNESIUM mg/dL  --  2.4 2.1 2.2   PHOSPHORUS mg/dL 6.8* 6.0* 5.2* 5.4*             Results from last 7 days   Lab Units 09/04/24 0313 09/03/24 0533 09/02/24  0551 09/01/24  0533 08/31/24  0436   WBC 10*3/mm3 11.09* 10.79 12.43* 12.51* 12.35*   HEMOGLOBIN g/dL 9.6* 9.1* 9.4* 9.2* 10.0*   PLATELETS 10*3/mm3 368 348 376 343 375             Assessment / Plan     ASSESSMENT:  CKD 4 baseline creatinine 2.  Chronic cardiorenal syndrome.  Waste products at plateau.  This may be her new baseline with trying to keep her more euvolemic.  Fine line for her with intravascular versus extravascular volume excess.  Edema  likely due to nephrotic proteinuria as well as HFpEF so likely cannot get rid of all edema without intravasc volume depletion .  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  Anti-PLA2R, cryoglobulin pending from 8/30/2024 .  She is not a candidate for renal biopsy.  2.  Heart failure preserved ejection fraction diuresing on po Bumex and oral Aldactone.   3.  Right lower extremity cellulitis completed antibiotics.  4.  Hypertension.  Hydralazine held yesterday afternoon so we will reduce the dose to 50 g 3 times daily.    5.  Anemia , iron deficient.    6.  Diabetes mellitus type II.  7.  Hypothyroid inadequately replaced.  Likely adding to her heart failure decompensation.  Supplement increased this admission .  PLAN:  Continue p.o. Bumex .  Ok for dc today.   Reduce hydralazine to 50 mg  TID.  Discussed with Dr. Painting.  5.   Has follow up with [facility] Wed Sept 25 11:45 am  Thank you for involving us in the care of Halie Guidyr.  Please feel free to call with any questions.    Mary Rice MD  09/04/24  09:15 EDT    [facility]  [phone]    Please note that portions of this note were completed with a voice recognition program.

## 2024-09-04 NOTE — PLAN OF CARE
Goal Outcome Evaluation:  Plan of Care Reviewed With: patient        Progress: improving  Outcome Evaluation: Dressing cdi, saline locked, monitoring blood sugars, coverage given as ordered, tylenol given for pain control, magic barrier cream applied to skin folds, purewick in place, fluid restiction maintained, daily weight, held Hydralazine tonight, saline locked, axox4, calm and cooperative, resting between care, bed alarm on

## 2024-09-04 NOTE — PROGRESS NOTES
"Enter Query Response Below      Query Response: Cellulitis due to diabetes mellitus              If applicable, please update the problem list.     Patient: Halie Guidry        : 1940  Account: [id number]           Admit Date: 2024        How to Respond to this query:       a. Click New Note     b. Answer query within the yellow box.                c. Update the Problem List, if applicable.      If you have any questions about this query contact me at: [email or web address]     Dr. Painting,     84-year-old female with history of type 2 diabetes mellitus admitted 24 (OBS), 24 (IP) with bilateral lower leg cellulitis per H&P.  Infectious Disease Consult reports \"Poorly controlled type 2 diabetes\".  Hemoglobin A1C: 7.90 ().  Blood Glucose: 333 ( 2258), 170 ( 0543), 248 ( 1108), 325 ( 1110), 225 ( 1159).  Treatment included: IV Cefazolin (-), IV Vancomycin (-), Monodox (-), Insulin glargine, Insulin lispro, and monitoring of labs.     Please clarify the following:    Cellulitis due to diabetes mellitus  Cellulitis not due to diabetes mellitus  Other- (please specify) _____________      By submitting this query, we are merely seeking further clarification of documentation to accurately reflect all conditions that you are monitoring, evaluating, treating or that extend the hospitalization or utilize additional resources of care. Please utilize your independent clinical judgment when addressing the question(s) above.     This query and your response, once completed, will be entered into the legal medical record.    Sincerely,  Peter Woo, HEENA, RN   Clinical Documentation Integrity Program     "

## 2024-09-05 ENCOUNTER — PATIENT OUTREACH (OUTPATIENT)
Dept: CASE MANAGEMENT | Facility: OTHER | Age: 84
End: 2024-09-05
Payer: MEDICARE

## 2024-09-05 DIAGNOSIS — L03.115 BILATERAL CELLULITIS OF LOWER LEG: Primary | ICD-10-CM

## 2024-09-05 DIAGNOSIS — L03.116 BILATERAL CELLULITIS OF LOWER LEG: Primary | ICD-10-CM

## 2024-09-05 LAB — PLA2R IGG SER IA-ACNC: <1.8 RU/ML (ref 0–19.9)

## 2024-09-05 NOTE — OUTREACH NOTE
AMBULATORY CASE MANAGEMENT NOTE    Names and Relationships of Patient/Support Persons: Contact: Derrick Guidry (HCS); Relationship: Emergency Contact -     [facility] Interim Update    Spoke with patient son(HCS), states that patient was discharge to [facility].     Stating that patient was discharged yesterday from [facility] [place].  Has been requesting assistance obtaining a mobility scooter.     ACM introduced self and purpose for outreach.     Per AVS patient was discharged to [facility] [place].    [facility] will place an outreach for f/u with discharge planner.          Education Documentation  No documentation found.        Carol THORNTON  Ambulatory Case Management    9/5/2024, 10:41 EDT

## 2024-09-05 NOTE — CASE MANAGEMENT/SOCIAL WORK
Case Management Discharge Note      Final Note: pt d/c'ed to [facility]    Provided Post Acute Provider List?: N/A  N/A Provider List Comment: Spoke with Rupali/Juliana Aguero [phone] who confirmed patient is able to return at [facility]. Rupali stated patient required assistance with ADLS prior to admission and was appropriate to return functionally at this time. Patient may need transportation at [facility] (SNF does not provide transportation). CCP to follow. Jesus BREWSTER RN    Selected Continued Care - Discharged on 9/4/2024 Admission date: 8/24/2024 - Discharge disposition: Skilled Nursing Facility (DC - External)      Destination Coordination complete.      Service Provider Selected Services Address Phone Fax Patient Preferred    [facility] [place] [id number] [facility] [address] [phone] --              Durable Medical Equipment    No services have been selected for the patient.                Dialysis/Infusion    No services have been selected for the patient.                Home Medical Care    No services have been selected for the patient.                Therapy    No services have been selected for the patient.                Community Resources    No services have been selected for the patient.                Community & DME    No services have been selected for the patient.                    Selected Continued Care - Episodes Includes continued care and service providers with selected services from the active episodes listed below      Chronic Care Management Episode start date: 7/31/2024 (Paused)   There are no active outsourced providers for this episode.                      Final Discharge Disposition Code: 03 - skilled nursing facility (SNF)

## 2024-09-06 LAB — CRYOGLOB SER QL 1D COLD INC: NORMAL

## 2024-09-10 ENCOUNTER — OFFICE VISIT (OUTPATIENT)
Dept: ENDOCRINOLOGY | Age: 84
End: 2024-09-10
Payer: MEDICARE

## 2024-09-10 ENCOUNTER — SPECIALTY PHARMACY (OUTPATIENT)
Dept: ENDOCRINOLOGY | Age: 84
End: 2024-09-10
Payer: MEDICARE

## 2024-09-10 VITALS
SYSTOLIC BLOOD PRESSURE: 130 MMHG | DIASTOLIC BLOOD PRESSURE: 82 MMHG | TEMPERATURE: 97.1 F | WEIGHT: 170.2 LBS | HEART RATE: 63 BPM | OXYGEN SATURATION: 98 % | HEIGHT: 68 IN | BODY MASS INDEX: 25.79 KG/M2

## 2024-09-10 DIAGNOSIS — Z79.4 TYPE 2 DIABETES MELLITUS WITH HYPERGLYCEMIA, WITH LONG-TERM CURRENT USE OF INSULIN: Primary | ICD-10-CM

## 2024-09-10 DIAGNOSIS — E11.65 TYPE 2 DIABETES MELLITUS WITH HYPERGLYCEMIA, WITH LONG-TERM CURRENT USE OF INSULIN: Primary | ICD-10-CM

## 2024-09-10 DIAGNOSIS — Z79.4 TYPE 2 DIABETES MELLITUS WITH HYPERGLYCEMIA, WITH LONG-TERM CURRENT USE OF INSULIN: ICD-10-CM

## 2024-09-10 DIAGNOSIS — E11.65 TYPE 2 DIABETES MELLITUS WITH HYPERGLYCEMIA, WITH LONG-TERM CURRENT USE OF INSULIN: ICD-10-CM

## 2024-09-10 DIAGNOSIS — Z79.4 TYPE 2 DIABETES MELLITUS WITH OTHER CIRCULATORY COMPLICATION, WITH LONG-TERM CURRENT USE OF INSULIN: ICD-10-CM

## 2024-09-10 DIAGNOSIS — E11.59 TYPE 2 DIABETES MELLITUS WITH OTHER CIRCULATORY COMPLICATION, WITH LONG-TERM CURRENT USE OF INSULIN: ICD-10-CM

## 2024-09-10 DIAGNOSIS — N18.4 BENIGN HYPERTENSION WITH CKD (CHRONIC KIDNEY DISEASE) STAGE IV: ICD-10-CM

## 2024-09-10 DIAGNOSIS — I12.9 BENIGN HYPERTENSION WITH CKD (CHRONIC KIDNEY DISEASE) STAGE IV: ICD-10-CM

## 2024-09-10 DIAGNOSIS — E78.5 HYPERLIPIDEMIA, UNSPECIFIED HYPERLIPIDEMIA TYPE: ICD-10-CM

## 2024-09-10 DIAGNOSIS — E03.9 PRIMARY HYPOTHYROIDISM: ICD-10-CM

## 2024-09-10 PROCEDURE — 3079F DIAST BP 80-89 MM HG: CPT | Performed by: NURSE PRACTITIONER

## 2024-09-10 PROCEDURE — 3075F SYST BP GE 130 - 139MM HG: CPT | Performed by: NURSE PRACTITIONER

## 2024-09-10 PROCEDURE — 99214 OFFICE O/P EST MOD 30 MIN: CPT | Performed by: NURSE PRACTITIONER

## 2024-09-10 PROCEDURE — 95251 CONT GLUC MNTR ANALYSIS I&R: CPT | Performed by: NURSE PRACTITIONER

## 2024-09-10 RX ORDER — INSULIN LISPRO 100 [IU]/ML
15-20 INJECTION, SOLUTION INTRAVENOUS; SUBCUTANEOUS
Qty: 60 ML | Refills: 0 | Status: SHIPPED | OUTPATIENT
Start: 2024-09-10

## 2024-09-10 RX ORDER — CHLORTHALIDONE 25 MG/1
TABLET ORAL
COMMUNITY
Start: 2024-09-03

## 2024-09-10 RX ORDER — PEN NEEDLE, DIABETIC 32GX 5/32"
1 NEEDLE, DISPOSABLE MISCELLANEOUS 4 TIMES DAILY
Qty: 400 EACH | Refills: 2 | Status: SHIPPED | OUTPATIENT
Start: 2024-09-10

## 2024-09-10 RX ORDER — INSULIN LISPRO 100 [IU]/ML
15-20 INJECTION, SOLUTION INTRAVENOUS; SUBCUTANEOUS
Start: 2024-09-10 | End: 2024-09-10 | Stop reason: SDUPTHER

## 2024-09-10 RX ORDER — LISINOPRIL 40 MG/1
TABLET ORAL
COMMUNITY
Start: 2024-09-03

## 2024-09-10 NOTE — PROGRESS NOTES
Specialty Pharmacy Patient Management Program  Prescription Refill Request     Patient currently fills medications at  Pharmacy. Needing refill(s) on the following:      Requested Prescriptions     Pending Prescriptions Disp Refills    Insulin Lispro, 1 Unit Dial, (HumaLOG KwikPen) 100 UNIT/ML solution pen-injector 60 mL 0     Sig: Inject 15-20 Units under the skin into the appropriate area as directed 3 (Three) Times a Day Before Meals.    Insulin Pen Needle (BD Pen Needle Naila 2nd Gen) 32G X 4 MM misc 400 each 2     Si each by Other route 4 (Four) Times a Day.       Last visit: 9/10/24  Next visit: 12/10/24    Pended for SERGE Reyna to review, and approve if appropriate.     Gwen Mon, PharmD  Clinical Specialty Pharmacist, Endocrinology  9/10/2024  15:37 EDT

## 2024-09-10 NOTE — PROGRESS NOTES
Chief Complaint  Chief Complaint   Patient presents with    Diabetes     Type 2: Pt Dexcom is attached, is up to date on eye exam, no hx of retinopathy, moderate neuropathy. Pt states that she is having a hard time getting toujeo refilled.        Subjective          History of Present Illness    Halie Guidry 84 y.o. presents for a follow-up for Type 2 Diabetes    Pt's son accompanied pt during today's visit.      She was diagnosed with diabetes more than 10 years ago      Pt now lives at assistant living but is still giving herself her insulin/medication      Pt is on the following medications for their diabetes: Toujeo 90 units daily and Humalog 12-17 units           Trulicity stopped due to diarrhea              Pt complains of numbness and tingling in feet/hands    Pt denies a history of diabetic retinopathy.  Last eye exam was 06/23     Pt does have nephropathy.  Patient is currently taking ACE   Follows with Nephrology  ACE stopped at 05/24 hospitalization     Pt does have neuropathy.  Current takes Lyrica 75 mg TID for this condition per PCP     Pt denies a history of CAD or CVA.    Last A1C in 08/24 was 7.9    Last microalbumin in 04/24 was positive           Blood Sugars    Blood glucoses are checked via Dexcom.    Fasting blood glucoses: high 100s-200s    Pre-meal blood glucoses: high 100s- 400s    Pt denies recent episodes of hypoglycemia.        Sensor Data    Time in range 7%  High 19%  Very high 74%  Low 0%  Very low 0%      Average Glucose - 303 mg/dL      Sensor Wear - 36 %          Hypothyroid    Pt complains of dry skin and cold intolerance     Current treatment is levothyroxine 137 mcg daily - was increased at recent 09/24 hospitalization    Last labs in 08/24 showed TSH 21.00 and FT4 0.84            Hyperlipidemia     Pt is currently taking atorvastatin 80 mg HS     Last lipid panel in 06/23 showed Total 138, HDL 33, LDL 82 and Triglycerides 130            Hypertension with CKD Stage 4       "  Current regimen includes bisoprolol 5 mg daily, hydralazine 50 mg every 8 hours, lisinopril 40 mg daily and spironolactone 50 mg daily                    Pancreatic Endocrine Tumor     Pt had a CT of ABD/Pelvis on 07/26/23 which showed There is a hyperenhancing mass at the pancreatic head measuring approximately 3.6 cm and there is a 2.8 cm exophytic component or node adjacently. The appearance is suspicious for a neuroendocrine tumor. The mass significantly narrows the downstream portion of the common bile duct, but there is no intrahepatic biliary dilatation and there is pneumobilia. There is mild dilatation of the main pancreatic duct. For tissue diagnosis, endoscopic ultrasound with FNA is recommended        FNA on 08/04/23 showed Pancreatic Endocrine Tumor     Pt is taking monthly octreotide, but has missed doses due to hospitalizations      Follows with Dr. Garcia                 Other History     Pt was admitted in 05/24 and had been out of insulin for over a month            I have reviewed the patient's allergies, medicines, past medical hx, family hx and social hx.    Objective   Vital Signs:   /82   Pulse 63   Temp 97.1 °F (36.2 °C) (Temporal)   Ht 172.7 cm (67.99\")   Wt 77.2 kg (170 lb 3.2 oz) Comment: patient provided.  SpO2 98%   BMI 25.88 kg/m²       Physical Exam   Physical Exam  Constitutional:       General: She is not in acute distress.     Appearance: Normal appearance. She is not diaphoretic.   HENT:      Head: Normocephalic and atraumatic.   Eyes:      General:         Right eye: No discharge.         Left eye: No discharge.   Skin:     General: Skin is warm and dry.   Neurological:      Mental Status: She is alert.   Psychiatric:         Mood and Affect: Mood normal.         Behavior: Behavior normal.                    Results Review:   Hemoglobin A1C   Date Value Ref Range Status   08/24/2024 7.90 (H) 4.80 - 5.60 % Final     Total Cholesterol   Date Value Ref Range Status "   06/23/2022 120 0 - 200 mg/dL Final     Triglycerides   Date Value Ref Range Status   06/02/2023 130 0 - 150 mg/dL Final   06/23/2022 127 0 - 150 mg/dL Final     HDL Cholesterol   Date Value Ref Range Status   06/02/2023 33 (L) 40 - 60 mg/dL Final   06/23/2022 34 (L) 40 - 60 mg/dL Final     LDL Chol Calc (NIH)   Date Value Ref Range Status   06/02/2023 82 0 - 100 mg/dL Final     VLDL Cholesterol Gerald   Date Value Ref Range Status   06/02/2023 23 5 - 40 mg/dL Final     LDL/HDL Ratio   Date Value Ref Range Status   06/23/2022 1.78  Final         Assessment and Plan {CC Problem List  Visit Diagnosis  ROS  Review (Popup)  Health Maintenance  Quality  BestPractice  Medications  SmartSets  SnapShot Encounters  Media :23  Diagnoses and all orders for this visit:    1. Type 2 diabetes mellitus with hyperglycemia, with long-term current use of insulin (Primary)  -     Insulin Lispro, 1 Unit Dial, (HumaLOG KwikPen) 100 UNIT/ML solution pen-injector; Inject 15-20 Units under the skin into the appropriate area as directed 3 (Three) Times a Day Before Meals.  -     Insulin Glargine, 2 Unit Dial, (TOUJEO) 300 UNIT/ML solution pen-injector injection; Inject 90 Units under the skin into the appropriate area as directed Daily.  Dispense: 30 mL; Refill: 0    A1c last month at 7.9%  Post prandial highs are really noted on Dexcom  Continue with Toujeo 90 units daily  Increase Humalog 15-20 units  Continue with Dexcom  Pt's son states that since living in assisted living she is eating better and taking her insulin more regularly      2. Primary hypothyroidism    Pt's dose was increase during hospital stay - wondering if the elevated was due to her illness and/or that she wasn't taking medication regularly  Looks like there are already orders placed to get levels rechecked      3. Hyperlipidemia, unspecified hyperlipidemia type    Continue with statin      4. Benign hypertension with CKD (chronic kidney disease) stage  IV    Stable  Continue with current medication regimen  Defer management to PCP/Nephrology        Refills sent to pharmacy      RTC in 3 months with me      Follow Up     Patient was given instructions and counseling regarding her condition or for health maintenance advice. Please see specific information pulled into the AVS if appropriate.                Lilia Orona, SERGE  09/10/24

## 2024-09-10 NOTE — PROGRESS NOTES
Specialty Pharmacy Patient Management Program  Endocrinology Initial Assessment     Halie Guidry was referred by an Endocrinology provider to the Endocrinology Patient Management program offered by [facility] for Type 2 Diabetes on 09/10/24.  An initial outreach was conducted, including assessment of therapy appropriateness and specialty medication education for Toujeo. The patient was introduced to services offered by [facility], including: regular assessments, refill coordination, curbside pick-up or mail order delivery options, prior authorization maintenance, and financial assistance programs as applicable. The patient was also provided with contact information for the pharmacy team.     Insurance Coverage & Financial Support  [employer]- medicare    Relevant Past Medical History and Comorbidities  Relevant medical history and concomitant health conditions were discussed with the patient. The patient's chart has been reviewed for relevant past medical history and comorbid conditions and updated as necessary.  Past Medical History:   Diagnosis Date    Acute metabolic encephalopathy 06/24/2022    Anxiety     Arthritis     Benign essential hypertension 08/20/2014    Bleeding disorder     Depression     Diabetes     Diabetes mellitus, type 2     Disc degeneration, lumbar     Headache, tension-type     Hyperlipidemia     Hypothyroidism     Neuropathy     Osteoporosis 09/09/2015    Pancreatic mass     Sept 2023, on Monthly Octreotide Injection    Peripheral neuropathy     Rotator cuff tear, left     Scoliosis     Shoulder pain     LEFT, TORN ROTATOR CUFF S/P FALL    Spinal stenosis      Social History     Socioeconomic History    Marital status:     Number of children: 3   Tobacco Use    Smoking status: Former     Current packs/day: 0.00     Average packs/day: 1 pack/day for 40.0 years (40.0 ttl pk-yrs)     Types: Cigarettes     Start date: 1973     Quit date:  2013     Years since quittin.6    Smokeless tobacco: Never   Vaping Use    Vaping status: Never Used   Substance and Sexual Activity    Alcohol use: No    Drug use: No    Sexual activity: Defer       Problem list reviewed by Gwen Mon PharmD on 9/10/2024 at  3:13 PM    Allergies  Known allergies and reactions were discussed with the patient. The patient's chart has been reviewed for  allergy information and updated as necessary.   Allergies   Allergen Reactions    Sulfa Antibiotics Unknown - High Severity     Pt says it was a long time ago and I don't remember but it wasn't good.        Allergies reviewed by Gwen Mon PharmD on 9/10/2024 at  3:05 PM    Relevant Laboratory Values  Relevant laboratory values were discussed with the patient. The following specialty medication dose adjustment(s) are recommended: labs today  A1C Last 3 Results          2023    20:26 2024    20:50 2024    17:33   HGBA1C Last 3 Results   Hemoglobin A1C 8.20  14.20  7.90      Lab Results   Component Value Date    HGBA1C 7.90 (H) 2024     Lab Results   Component Value Date    GLUCOSE 160 (H) 2024    CALCIUM 8.8 2024     2024    K 4.7 2024    CO2 19.8 (L) 2024     2024    BUN 73 (H) 2024    CREATININE 2.62 (H) 2024    EGFRIFAFRI 50 (L) 2021    EGFRIFNONA 51 (L) 2022    BCR 27.9 (H) 2024    ANIONGAP 16.2 (H) 2024     Lab Results   Component Value Date    CHOL 120 2022    CHLPL 138 2023    TRIG 130 2023    HDL 33 (L) 2023    LDL 82 2023     Microalbumin          2024    04:09   Microalbumin   Microalbumin, Urine 219.4        Current Medication List  This medication list has been reviewed with the patient and evaluated for any interactions or necessary modifications/recommendations, and updated to include all prescription medications, OTC medications, and supplements the patient is currently  taking.  This list reflects what is contained in the patient's profile, which has also been marked as reviewed to communicate to other providers it is the most up to date version of the patient's current medication therapy.     Current Outpatient Medications:     acetaminophen (TYLENOL) 325 MG tablet, Take 2 tablets by mouth Every 6 (Six) Hours As Needed for Mild Pain or Moderate Pain., Disp: , Rfl:     ammonium lactate (AMLACTIN) 12 % cream, Apply 1 Application topically to the appropriate area as directed 2 (Two) Times a Day., Disp: , Rfl:     atorvastatin (LIPITOR) 80 MG tablet, Take 1 tablet by mouth Every Night. Indications: High Amount of Fats in the Blood, Disp: 30 tablet, Rfl: 2    bisoprolol (ZEBeta) 5 MG tablet, Take 1 tablet by mouth Daily., Disp: 90 tablet, Rfl: 3    bumetanide (BUMEX) 2 MG tablet, Take 1 tablet by mouth 2 (Two) Times a Day., Disp: , Rfl:     chlorthalidone (HYGROTON) 25 MG tablet, , Disp: , Rfl:     DULoxetine (CYMBALTA) 30 MG capsule, Take 1 capsule by mouth Daily., Disp: 90 capsule, Rfl: 3    ferrous sulfate 325 (65 FE) MG tablet, Take 1 tablet by mouth Every Other Day., Disp: , Rfl:     hydrALAZINE (APRESOLINE) 50 MG tablet, Take 1 tablet by mouth Every 8 (Eight) Hours., Disp: , Rfl:     hydrocortisone-bacitracin-zinc oxide-nystatin (MAGIC BARRIER), Apply 1 Application topically to the appropriate area as directed 2 (Two) Times a Day., Disp: , Rfl:     Insulin Glargine, 2 Unit Dial, (TOUJEO) 300 UNIT/ML solution pen-injector injection, Inject 90 Units under the skin into the appropriate area as directed Daily., Disp: 30 mL, Rfl: 0    Insulin Lispro, 1 Unit Dial, (HumaLOG KwikPen) 100 UNIT/ML solution pen-injector, Inject 15-20 Units under the skin into the appropriate area as directed 3 (Three) Times a Day Before Meals., Disp: , Rfl:     Insulin Pen Needle (BD Pen Needle Naila 2nd Gen) 32G X 4 MM misc, 1 each by Other route 4 (Four) Times a Day., Disp: 400 each, Rfl: 2    lansoprazole  (PREVACID) 30 MG capsule, TAKE 1 CAPSULE DAILY, Disp: 30 capsule, Rfl: 11    levothyroxine (SYNTHROID, LEVOTHROID) 137 MCG tablet, Take 1 tablet by mouth Every Morning., Disp: , Rfl:     lisinopril (PRINIVIL,ZESTRIL) 40 MG tablet, , Disp: , Rfl:     octreotide (sandoSTATIN) 30 MG injection, Inject 30 mg into the appropriate muscle as directed by prescriber Every 28 (Twenty-Eight) Days. Next dose 9/16/2024, Disp: , Rfl:     pregabalin (LYRICA) 75 MG capsule, Take 1 capsule by mouth 2 (Two) Times a Day for 3 days., Disp: 6 capsule, Rfl: 0    spironolactone (ALDACTONE) 50 MG tablet, Take 1 tablet by mouth Daily., Disp: , Rfl:     terazosin (HYTRIN) 1 MG capsule, Take 1 capsule by mouth Every Night., Disp: , Rfl:   No current facility-administered medications for this visit.    Medicines reviewed by Gewn Mon, PharmD on 9/10/2024 at  3:13 PM    Drug Interactions  Drug-Drug: spironolactone and lisinoprilHyperkalemia, possibly with cardiac arrhythmias or arrest, may occur with the combination of potassium-sparing diuretics and ACE inhibitors. Serum potassium concentrations and renal function should be monitored. Patients with renal impairment, diabetes, older age, severe heart failure, and risk for dehydration may be at greater risk.  Recommended Medications Assessment  Aspirin: Not Taking Currently  Statin: Currently Taking   ACEi/ARB: Currently Taking     Initial Education Provided for Specialty Medication  The patient has been provided with the following education and any applicable administration techniques (i.e. self-injection) have been demonstrated for the therapies indicated. All questions and concerns have been addressed prior to the patient receiving the medication, and the patient has verbalized comprehension of the education and any materials provided. Additional patient education shall be provided and documented upon request by the patient, provider, or payer.    TOUJEO® (insulin glargine)   Medication  Expectations    Why am I taking this medication?  You are taking this medication to lower blood sugar and A1c because you have type 1 or type 2 diabetes. Diabetes is not curable but with proper medication and treatment, we can keep your blood sugar within your personalized target range.    What should I expect while on this medication?  You should expect to see your blood sugar and A1c decrease over time.    How does the medication work?  Toujeo is a long-acting insulin that releases slowly and continuously in the body. Insulin helps the sugar in your blood get into cells and provide them with energy to fuel your body.     How long will I be on this medication for?  If you have Type 1 diabetes, you will be on this medication or another insulin throughout your lifetime because your body cannot make its own insulin. If you have Type 2 diabetes, the amount of time you will be on this medication will be determined by your doctor based on blood sugar and A1c control. You will most likely be on this medication or another diabetes medication throughout your lifetime because your body does not make enough insulin. Do not abruptly stop this medication without talking to your doctor first.     How do I take this medication?  Take as directed on your prescription label. Toujeo is usually given once daily and can be taken with or without food. Toujeo is injected under the skin (subcutaneously) of your stomach, thigh, buttocks, or upper arm. Use a different injection site in the same body region each day.     What are some possible side effects?  Toujeo may cause low blood sugar (hypoglycemia). Signs and symptoms that may indicate hypoglycemia include dizziness, sweating, anxiety, irritability, confusion, or headache.    What happens if I miss a dose?  If you miss a dose, take it as soon as you remember. If it is close to your next dose, skip it. Never take 2 doses at once.       Medication Safety    What are things I should  warn my doctor immediately about?  Tell your doctor if you have kidney or liver problems. Talk to your doctor if you are pregnant, planning to become pregnant, or breastfeeding. Also tell your doctor if you notice any signs/symptoms of an allergic reaction (rash, hives, difficulty breathing, etc.).    What are things that I should be cautious of?  Be cautious of any side effects from this medication. Talk to your doctor if any new ones develop or aren't getting better.    What are some medications that can interact with this one?  Do not take this medication with rosiglitazone or macimorelin. Taking Toujeo with other medications that lower your blood sugar may increase the risk of hypoglycemia. Your doctor may reduce the dose of these medications when you start Toujeo. Always tell your doctor or pharmacist immediately if you start taking any new medications, including over-the-counter medications, vitamins, and herbal supplements.        Medication Storage/Handling    How should I handle this medication?  Keep this medication out of reach of pets/children and keep the pen capped when not in use. Do not share your medicine with others.     How does this medication need to be stored?  Store your new, unused pens in the original carton in the refrigerator. Protect it from light. Do not freeze. Store your opened pen at room temperature for up to 56 days (8 weeks). Always remove the needle and place the cap on the pen before storing.     How should I dispose of this medication?  Used Toujeo pens should be thrown away after 56 days even if insulin remains.?Place your used pen and needle in an approved sharps container?If your doctor decides to stop this medication, take to your local police station for proper disposal. Some pharmacies also have?take-back bins for medication drop-off.??       Resources/Support    How can I remind myself to take this medication?  You can download reminder apps to help you manage your  refills or set an alarm on your phone to remind you.     Is financial support available?   [employer] can provide co-pay cards if you have commercial insurance or patient assistance if you have Medicare or no insurance.     Which vaccines are recommended for me?  Talk to your doctor about these vaccines: Flu, Coronavirus (COVID-19), Pneumococcal (pneumonia), Tdap, Hepatitis B, Zoster (shingles)         Adherence and Self-Administration  Adherence related to the patient's specialty therapy was discussed with the patient. The Adherence segment of this outreach has been reviewed and updated.     Is there a concern with patient's ability to self administer the medication correctly and without issue?: No  Were any potential barriers to adherence identified during the initial assessment or patient education?: No  Are there any concerns regarding the patient's understanding of the importance of medication adherence?: No  Methods for Supporting Patient Adherence and/or Self-Administration:  n/a    Open Medication Therapy Problems  No medication therapy recommendations to display    Goals of Therapy  Goals related to the patient's specialty therapy were discussed with the patient. The Patient Goals segment of this outreach has been reviewed and updated.   Goals Addressed Today        Specialty Pharmacy General Goal      A1c < 7%    Lab Results   Component Value Date    HGBA1C 7.90 (H) 08/24/2024    HGBA1C 14.20 (H) 04/20/2024    HGBA1C 8.20 (H) 12/24/2023    HGBA1C 11.80 (H) 06/02/2023    HGBA1C 7.70 (H) 09/01/2022                 Reassessment Plan & Follow-Up  1. Medication Therapy Changes: Continue toujeo 90 units daily, increase humalog to 15-20 units three times daily with meals.  2. Related Plans, Therapy Recommendations, or Therapy Problems to Be Addressed: none  3. Pharmacist to perform regular assessments no more than (6) months from the previous assessment.  4. Care Coordinator to set up future refill outreaches,  coordinate prescription delivery, and escalate clinical questions to pharmacist.  5. Welcome information and patient satisfaction survey to be sent by specialty pharmacy team with patient's initial fill.     Specialty Pharmacy Patient Management Program  Endocrinology Refill Outreach      Halie is a 84 y.o. female contacted today regarding refills of her medication(s).    Specialty medication(s) and dose(s) confirmed: toujeo      Delivery Questions      Flowsheet Row Most Recent Value   Delivery method UPS   Delivery address verified with patient/caregiver? Yes   Delivery address Home   Number of medications in delivery 1   Medication(s) being filled and delivered Insulin Glargine   Copay verified? Yes   Copay amount 105   Copay form of payment Credit/debit on file   Ship Date 9/11   Delivery Date 9/12   Signature Required No            Follow-Up: 80 days    Attestation  Therapeutic appropriateness: Appropriate   I attest the patient was actively involved in and has agreed to the above plan of care. If the prescribed therapy is at any point deemed not appropriate based on the current or future assessments, a consultation will be initiated with the patient's specialty care provider to determine the best course of action. The revised plan of therapy will be documented along with any required assessments and/or additional patient education provided.     Gwen Mon, PharmD  Clinical Specialty Pharmacist, Endocrinology  9/10/2024  16:04 EDT

## 2024-09-12 ENCOUNTER — TELEPHONE (OUTPATIENT)
Dept: FAMILY MEDICINE CLINIC | Facility: CLINIC | Age: 84
End: 2024-09-12
Payer: MEDICARE

## 2024-09-12 NOTE — TELEPHONE ENCOUNTER
Pt dc'd from rehab with no wound care orders.  Verbal given for eval and redress.   will send report and orders.  Nurse# [phone]

## 2024-09-13 ENCOUNTER — TELEPHONE (OUTPATIENT)
Dept: FAMILY MEDICINE CLINIC | Facility: CLINIC | Age: 84
End: 2024-09-13
Payer: MEDICARE

## 2024-09-13 NOTE — TELEPHONE ENCOUNTER
Caller: SADIA MAURER    Relationship: Home Health    Best call back number: [phone]    What orders are you requesting (i.e. lab or imaging): PT    In what timeframe would the patient need to come in: ASAP    Where will you receive your lab/imaging services: [facility]

## 2024-09-16 ENCOUNTER — OFFICE VISIT (OUTPATIENT)
Dept: ONCOLOGY | Facility: CLINIC | Age: 84
End: 2024-09-16
Payer: MEDICARE

## 2024-09-16 ENCOUNTER — INFUSION (OUTPATIENT)
Dept: ONCOLOGY | Facility: HOSPITAL | Age: 84
End: 2024-09-16
Payer: MEDICARE

## 2024-09-16 ENCOUNTER — LAB (OUTPATIENT)
Dept: LAB | Facility: HOSPITAL | Age: 84
End: 2024-09-16
Payer: MEDICARE

## 2024-09-16 VITALS
DIASTOLIC BLOOD PRESSURE: 77 MMHG | HEART RATE: 58 BPM | SYSTOLIC BLOOD PRESSURE: 166 MMHG | TEMPERATURE: 98.7 F | OXYGEN SATURATION: 99 % | BODY MASS INDEX: 27.28 KG/M2 | WEIGHT: 180 LBS | HEIGHT: 68 IN

## 2024-09-16 DIAGNOSIS — Z79.899 ENCOUNTER FOR LONG-TERM (CURRENT) USE OF HIGH-RISK MEDICATION: ICD-10-CM

## 2024-09-16 DIAGNOSIS — C7A.8 PRIMARY MALIGNANT NEUROENDOCRINE TUMOR OF PANCREAS: Primary | ICD-10-CM

## 2024-09-16 DIAGNOSIS — Z79.899 ENCOUNTER FOR LONG-TERM (CURRENT) USE OF HIGH-RISK MEDICATION: Primary | ICD-10-CM

## 2024-09-16 DIAGNOSIS — C7A.8 PRIMARY MALIGNANT NEUROENDOCRINE TUMOR OF PANCREAS: ICD-10-CM

## 2024-09-16 DIAGNOSIS — N18.31 ANEMIA DUE TO STAGE 3A CHRONIC KIDNEY DISEASE: ICD-10-CM

## 2024-09-16 DIAGNOSIS — D63.1 ANEMIA DUE TO STAGE 3A CHRONIC KIDNEY DISEASE: ICD-10-CM

## 2024-09-16 LAB
BASOPHILS # BLD AUTO: 0.07 10*3/MM3 (ref 0–0.2)
BASOPHILS NFR BLD AUTO: 0.8 % (ref 0–1.5)
DEPRECATED RDW RBC AUTO: 58.2 FL (ref 37–54)
EOSINOPHIL # BLD AUTO: 0.35 10*3/MM3 (ref 0–0.4)
EOSINOPHIL NFR BLD AUTO: 4.1 % (ref 0.3–6.2)
ERYTHROCYTE [DISTWIDTH] IN BLOOD BY AUTOMATED COUNT: 17.5 % (ref 12.3–15.4)
HCT VFR BLD AUTO: 31.2 % (ref 34–46.6)
HGB BLD-MCNC: 9.5 G/DL (ref 12–15.9)
IMM GRANULOCYTES # BLD AUTO: 0.03 10*3/MM3 (ref 0–0.05)
IMM GRANULOCYTES NFR BLD AUTO: 0.4 % (ref 0–0.5)
LYMPHOCYTES # BLD AUTO: 0.91 10*3/MM3 (ref 0.7–3.1)
LYMPHOCYTES NFR BLD AUTO: 10.7 % (ref 19.6–45.3)
MCH RBC QN AUTO: 27.7 PG (ref 26.6–33)
MCHC RBC AUTO-ENTMCNC: 30.4 G/DL (ref 31.5–35.7)
MCV RBC AUTO: 91 FL (ref 79–97)
MONOCYTES # BLD AUTO: 0.52 10*3/MM3 (ref 0.1–0.9)
MONOCYTES NFR BLD AUTO: 6.1 % (ref 5–12)
NEUTROPHILS NFR BLD AUTO: 6.65 10*3/MM3 (ref 1.7–7)
NEUTROPHILS NFR BLD AUTO: 77.9 % (ref 42.7–76)
NRBC BLD AUTO-RTO: 0 /100 WBC (ref 0–0.2)
PLATELET # BLD AUTO: 286 10*3/MM3 (ref 140–450)
PMV BLD AUTO: 10 FL (ref 6–12)
RBC # BLD AUTO: 3.43 10*6/MM3 (ref 3.77–5.28)
T4 FREE SERPL-MCNC: 1.14 NG/DL (ref 0.92–1.68)
TSH SERPL DL<=0.05 MIU/L-ACNC: 3.47 UIU/ML (ref 0.27–4.2)
WBC NRBC COR # BLD AUTO: 8.53 10*3/MM3 (ref 3.4–10.8)

## 2024-09-16 PROCEDURE — 1160F RVW MEDS BY RX/DR IN RCRD: CPT | Performed by: NURSE PRACTITIONER

## 2024-09-16 PROCEDURE — 25010000002 OCTREOTIDE PER 1 MG: Performed by: NURSE PRACTITIONER

## 2024-09-16 PROCEDURE — 36415 COLL VENOUS BLD VENIPUNCTURE: CPT

## 2024-09-16 PROCEDURE — 99214 OFFICE O/P EST MOD 30 MIN: CPT | Performed by: NURSE PRACTITIONER

## 2024-09-16 PROCEDURE — 84439 ASSAY OF FREE THYROXINE: CPT | Performed by: INTERNAL MEDICINE

## 2024-09-16 PROCEDURE — 3077F SYST BP >= 140 MM HG: CPT | Performed by: NURSE PRACTITIONER

## 2024-09-16 PROCEDURE — 1159F MED LIST DOCD IN RCRD: CPT | Performed by: NURSE PRACTITIONER

## 2024-09-16 PROCEDURE — 1125F AMNT PAIN NOTED PAIN PRSNT: CPT | Performed by: NURSE PRACTITIONER

## 2024-09-16 PROCEDURE — 84443 ASSAY THYROID STIM HORMONE: CPT | Performed by: INTERNAL MEDICINE

## 2024-09-16 PROCEDURE — 85025 COMPLETE CBC W/AUTO DIFF WBC: CPT

## 2024-09-16 PROCEDURE — 96372 THER/PROPH/DIAG INJ SC/IM: CPT

## 2024-09-16 PROCEDURE — 3078F DIAST BP <80 MM HG: CPT | Performed by: NURSE PRACTITIONER

## 2024-09-16 RX ADMIN — OCTREOTIDE ACETATE 30 MG: KIT at 14:52

## 2024-09-17 ENCOUNTER — SPECIALTY PHARMACY (OUTPATIENT)
Dept: ENDOCRINOLOGY | Age: 84
End: 2024-09-17
Payer: MEDICARE

## 2024-09-18 ENCOUNTER — TELEPHONE (OUTPATIENT)
Dept: FAMILY MEDICINE CLINIC | Facility: CLINIC | Age: 84
End: 2024-09-18
Payer: MEDICARE

## 2024-09-20 ENCOUNTER — TELEPHONE (OUTPATIENT)
Dept: FAMILY MEDICINE CLINIC | Facility: CLINIC | Age: 84
End: 2024-09-20
Payer: MEDICARE

## 2024-09-20 ENCOUNTER — PATIENT OUTREACH (OUTPATIENT)
Dept: CASE MANAGEMENT | Facility: OTHER | Age: 84
End: 2024-09-20
Payer: MEDICARE

## 2024-09-20 ENCOUNTER — TELEPHONE (OUTPATIENT)
Dept: CASE MANAGEMENT | Facility: OTHER | Age: 84
End: 2024-09-20
Payer: MEDICARE

## 2024-09-20 DIAGNOSIS — I50.9 ACUTE CONGESTIVE HEART FAILURE, UNSPECIFIED HEART FAILURE TYPE: Primary | ICD-10-CM

## 2024-09-20 DIAGNOSIS — L03.115 BILATERAL CELLULITIS OF LOWER LEG: ICD-10-CM

## 2024-09-20 DIAGNOSIS — L03.116 BILATERAL CELLULITIS OF LOWER LEG: ICD-10-CM

## 2024-09-20 RX ORDER — FLUTICASONE PROPIONATE 50 MCG
2 SPRAY, SUSPENSION (ML) NASAL DAILY
Qty: 16 G | Refills: 11 | Status: SHIPPED | OUTPATIENT
Start: 2024-09-20

## 2024-09-23 ENCOUNTER — TELEPHONE (OUTPATIENT)
Dept: ONCOLOGY | Facility: CLINIC | Age: 84
End: 2024-09-23
Payer: MEDICARE

## 2024-09-23 RX ORDER — DIAZEPAM 2 MG
2 TABLET ORAL ONCE
Qty: 1 TABLET | Refills: 0 | Status: SHIPPED | OUTPATIENT
Start: 2024-09-23 | End: 2024-09-23

## 2024-09-30 ENCOUNTER — PATIENT OUTREACH (OUTPATIENT)
Dept: CASE MANAGEMENT | Facility: OTHER | Age: 84
End: 2024-09-30
Payer: MEDICARE

## 2024-09-30 DIAGNOSIS — R53.1 GENERALIZED WEAKNESS: ICD-10-CM

## 2024-09-30 DIAGNOSIS — L03.116 BILATERAL CELLULITIS OF LOWER LEG: ICD-10-CM

## 2024-09-30 DIAGNOSIS — I50.9 ACUTE CONGESTIVE HEART FAILURE, UNSPECIFIED HEART FAILURE TYPE: Primary | ICD-10-CM

## 2024-09-30 DIAGNOSIS — L03.115 BILATERAL CELLULITIS OF LOWER LEG: ICD-10-CM

## 2024-09-30 NOTE — OUTREACH NOTE
[facility] End of Month Documentation    This Chronic Medical Management Care Plan for Halie Guidry, 84 y.o. female, has been monitored and managed; reviewed and a new plan of care implemented for the month of August.  A cumulative time of 20  minutes was spent on this patient record this month, including phone call with patient; chart review; electronic communication with primary care provider, medication review with HH/ f/u appointment scheduled/ PT/OT/ SN assessment.    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; Severe hypothyroidism; Noncompliance with medication regimen; Vitamin D deficiency disease; Tremor; Seizure; Thoracic degenerative disc disease; Hyperglycemia; Type II diabetes mellitus, uncontrolled; Lower abdominal pain; Transaminitis; Emphysematous cystitis; Choledocholithiasis; Hypokalemia; Hypoxia; Generalized abdominal pain; History of Clostridium difficile infection; History of ERCP; Hypomagnesemia; Anxiety disorder; Neuropathic pain; Intertrigo; Weakness of both lower extremities; Accelerated hypertension; Acute cystitis without hematuria; Left lower lobe pneumonia; Cellulitis and abscess of left lower extremity; Benign hypertension with CKD (chronic kidney disease) stage IV; Peripheral edema; Primary malignant neuroendocrine tumor of pancreas; Encounter for long-term (current) use of high-risk medication; Diabetic foot ulcer; Stage 3a chronic kidney disease; Pressure injury of buttock, stage 2; HTN (hypertension); Anemia due to chronic kidney disease; Bilateral  lower extremity edema; Cellulitis of right lower extremity; CKD (chronic kidney disease) stage 4, GFR 15-29 ml/min; Acute CHF; and Bilateral cellulitis of lower leg on their problem list., the following items were addressed: medical records; medications, Care coordination/ diesease education and any changes can be found within the plan section of the note.  A detailed listing of time spent for chronic care management is tracked within each outreach encounter.  Current medications include:  has a current medication list which includes the following prescription(s): acetaminophen, ammonium lactate, atorvastatin, bisoprolol, bumetanide, chlorthalidone, duloxetine, ferrous sulfate, fluticasone, hydralazine, hydrocortisone-bacitracin-zinc oxide-nystatin, insulin glargine (2 unit dial), insulin lispro (1 unit dial), bd pen needle blayne 2nd gen, lansoprazole, levothyroxine, lisinopril, octreotide, pregabalin, spironolactone, and terazosin. and the patient is reported to be patient is compliant with medication protocol,  Medications are reported to be effective.  Regarding these diagnoses, referrals were made to the following provider(s):  none.  All notes on chart for PCP to review.    The patient was monitored remotely for pain; activity level.    The patient's physical needs include:  DME supplies; physical healthcare.     The patient's mental support needs include:  not applicable    The patient's cognitive support needs include:  not applicable    The patient's psychosocial support needs include:  not applicable    The patient's functional needs include: physical healthcare    The patient's environmental needs include:  not applicable, na    Care Plan overall comments:  No data recorded    Refer to previous outreach notes for more information on the areas listed above.    Monthly Billing Diagnoses  (I50.9) Acute congestive heart failure, unspecified heart failure type    (L03.116,  L03.115) Bilateral cellulitis of lower  leg    (R53.1) Generalized weakness    Medications   Medications have been reconciled    Care Plan progress this month:      Recently Modified Care Plans Updates made since 8/30/2024 12:00 AM       Heart Failure (Adult)           Problem Priority Last Modified     Symptom Exacerbation (Heart Failure) --  9/20/2024  2:29 PM by Carol Smiley RN              Goal Recent Progress Last Modified     Symptom Exacerbation Prevented or Minimized --  9/20/2024  2:29 PM by Carol Smiley RN     Evidence-based guidance:   Perform or review cognitive and/or health literacy screening.   Assess understanding of adherence and barriers to treatment plan, as well as lifestyle changes; develop strategies to address barriers.   Establish a jldwrxrd-hxqkgi-jbrm early intervention process to communicate with primary care provider when signs/symptoms worsen.   Facilitate timely posthospital discharge or emergency department treatment that includes intensive follow-up via telephone calls, home visit, telehealth monitoring and care at multidisciplinary heart failure clinic.   Adjust frequency and intensity of follow-up based on presentation, number of emergency department visits, hospital admissions and frequency and severity of symptom exacerbation.   Facilitate timely visit, usually within 1 week, with primary care provider following hospital discharge.   Collaborate with clinical pharmacist to address adverse drug reactions, drug interactions, subtherapeutic dosage, patient and family education.   Regularly screen for presence of depressive symptoms using a validated tool; consider pharmacologic therapy and/or referral for cognitive behavioral therapy when present.   Refer to community-based services, such as a heart failure support group, community health worker or peer support program.   Review immunization status; arrange receipt of needed vaccinations.   Prepare patient for home oxygen use based on signs/symptoms.    Notes:             Task Due Date Last Modified     Identify and Minimize Risk of Heart Failure Exacerbation --  9/20/2024  2:29 PM by Carol Smiley RN     Care Management Activities:      - not discussed during this outreach      Notes:              Problem Priority Last Modified     Disease Progression (Heart Failure) --  9/20/2024  2:29 PM by Carol Smiley RN              Goal Recent Progress Last Modified     Comorbidities Identified and Managed --  9/20/2024  2:29 PM by Carol Smiley RN     Evidence-based guidance:   Assess and address signs/symptoms of comorbidity, including dyslipidemia, diabetes, iron deficiency, gout, arthritis, dysrhythmia, hypertension, cachexia, coronary artery disease, kidney dysfunction and lung disease.   Prepare patient for laboratory and diagnostic exams based on risk and presentation.   Prepare for use of pharmacologic therapy that may include statin, angiotensin converting enzyme (ACE) inhibitor, angiotensin receptor blocker (ARB), beta-blocker, digoxin, antidysrhythmic, diuretic or omega-3 fatty acid.   Monitor side effects and anticipate need for periodic adjustments.   Prepare patient for potential invasive treatment, such as implantable cardioverter-defibrillator, cardiac resynchronization therapy or heart transplant as disease progresses.    Notes:            Task Due Date Last Modified     Identify and Manage Comorbidities --  9/20/2024  2:29 PM by Carol Smiley RN     Care Management Activities:      - not discussed during this outreach      Notes:            Goal Recent Progress Last Modified     Health Optimized --  9/20/2024  2:29 PM by Carol Smiley RN     Evidence-based guidance:   Use brief intervention, such as 5 A's (Ask, Advise, Assess, Assist, Arrange) to encourage smoking cessation; refer to smoking cessation program, if ready for more intensive intervention.   Perform or refer to a registered dietitian for a nutrition assessment and nutrition-focused physical exam.     Identify potential micronutrient deficiencies, such as iron, vitamin D and thiamin.   Assess need for potential diet and fluid modification, such as reduced sodium or fluid intake.   Minimize unnecessary dietary restrictions to increase oral intake. Note: Sodium restriction should be individualized to the patient and clinical status.   Facilitate home monitoring of weight.    Notes:            Task Due Date Last Modified     Optimize Health --  9/20/2024  2:29 PM by Carol Smiley RN     Care Management Activities:      - not discussed during this outreach      Notes:              Problem Priority Last Modified     Activity Tolerance (Heart Failure) --  9/20/2024  2:29 PM by aCrol Smiley RN              Goal Recent Progress Last Modified     Activity Tolerance Optimized --  9/20/2024  2:29 PM by Carol Smiley RN     Evidence-based guidance:   Promote daily physical activity that improves functional ability, cognition and quality of life.   Encourage reduction in sedentary time.    Encourage optimal, safe functional mobility and self-care performance based on ability and tolerance.    Promote breathing and energy conservation techniques, such as pursed-lip breathing, preplanning and pacing of activity, balancing activity and rest.   Encourage participation in cardiac rehabilitation services.    Notes:            Task Due Date Last Modified     Maintain Strength and Functional Ability --  9/20/2024  2:29 PM by Carol Smiley RN     Care Management Activities:      - not discussed during this outreach      Notes:              Problem Priority Last Modified     Obstructive Sleep Apnea (Heart Failure) --  9/20/2024  2:29 PM by Carol Smiley RN              Goal Recent Progress Last Modified     Effective Breathing Pattern During Sleep --  9/20/2024  2:29 PM by Carol Smiley RN     Evidence-based guidance:   Use a validated screening tool to identify risk for obstructive sleep apnea (MELVI).   Encourage a nonsupine  sleep position, such as side-lying, head of bed elevated, multiple pillows, to prevent airway collapse.   Encourage the use of sleep hygiene techniques.   Anticipate a referral for sleep study (polysomnography).   If MELVI is identified, prepare patient for treatment options, such as nighttime oxygen therapy and CPAP (continuous positive airway pressure) or BiPAP (bilevel positive airway pressure).    Notes:            Task Due Date Last Modified     Monitor and Manage Obstructive Sleep Apnea (MELVI) --  9/20/2024  2:29 PM by Carol Smiley RN     Care Management Activities:      - not discussed during this outreach      Notes:                      Instructions   Patient was provided an electronic copy of care plan  CCM services were explained and offered and patient has accepted these services.  Patient has given their written consent to receive CCM services and understands that this includes the authorization of electronic communication of medical information with the other treating providers.  Patient understands that they may stop CCM services at any time and these changes will be effective at the end of the calendar month and will effectively revocate the agreement of CCM services.  Patient understands that only one practitioner can furnish and be paid for CCM services during one calendar month.  Patient also understands that there may be co-payment or deductible fees in association with CCM services.  Patient will continue with at least monthly follow-up calls with the Ambulatory .    Carol THORNTON  Ambulatory Case Management    9/30/2024, 09:58 EDT

## 2024-10-02 ENCOUNTER — PRIOR AUTHORIZATION (OUTPATIENT)
Dept: ONCOLOGY | Facility: HOSPITAL | Age: 84
End: 2024-10-02
Payer: MEDICARE

## 2024-10-02 NOTE — TELEPHONE ENCOUNTER
Outcome  Approved today by [employer] 2017  CaseId:[id number];Status:Approved;Review Type:Prior Auth;Coverage Start Date:09/02/2024;Coverage End Date:11/01/2024;  Authorization Expiration Date: 10/31/2024  Drug  diazePAM 2MG tablets  ePA cloud logo  Form  [employer] Electronic PA Form (2017 NCPDP)  Original Claim Info  [id number] IF LEVEL OF CARE CHANGE CALL HELP DESK For [employer] Coupon Price of: $0.29 submit to BIN: [id number] PCN: [id number] Group: COUPON --Service provided at no cost and no switch fee to the pharmacy--  
Statement Selected

## 2024-10-03 ENCOUNTER — TELEPHONE (OUTPATIENT)
Dept: FAMILY MEDICINE CLINIC | Facility: CLINIC | Age: 84
End: 2024-10-03

## 2024-10-03 RX ORDER — BUMETANIDE 2 MG/1
2 TABLET ORAL 2 TIMES DAILY
Qty: 7 TABLET | Refills: 0 | Status: CANCELLED
Start: 2024-10-03

## 2024-10-03 RX ORDER — SPIRONOLACTONE 50 MG/1
50 TABLET, FILM COATED ORAL DAILY
Qty: 7 TABLET | Refills: 0 | Status: CANCELLED
Start: 2024-10-03

## 2024-10-03 RX ORDER — HYDRALAZINE HYDROCHLORIDE 50 MG/1
50 TABLET, FILM COATED ORAL EVERY 8 HOURS SCHEDULED
Qty: 7 TABLET | Refills: 0 | Status: CANCELLED
Start: 2024-10-03

## 2024-10-03 NOTE — TELEPHONE ENCOUNTER
Unable to reach patient. Ok for the hub to relay message if needed  Pt has a scheduled appointment 10/08/2024 dr flanagan

## 2024-10-03 NOTE — TELEPHONE ENCOUNTER
Caller: JENNIFER MAURER    Best call back number: [phone]     What medication are you requesting: ALL OF PATIENTS MEDICATIONS AS WELL AS     spironolactone (ALDACTONE) 50 MG tablet       bumetanide (BUMEX) 2 MG tablet       hydrALAZINE (APRESOLINE) 50 MG tablet       terazosin (HYTRIN) 1 MG capsule         If a prescription is needed, what is your preferred pharmacy and phone number:    [facility] - [place], [place] - [address] [place] [address] - [phone] [facility] [phone]  [phone]     Additional notes: PATIENT WOULD LIKE ALL OF HER MEDICATIONS SENT TO [facility] SO THEY ARE ABLE TO PRE PACKAGE HER PRESCRIPTIONS FOR HER    PLEASE ADVISE

## 2024-10-03 NOTE — TELEPHONE ENCOUNTER
With Family Medicine (Napoleon Mead MD)  10/08/2024 at 1:30 PM    HYTRIN MEDICATION NOT LISTED ON MED LIST

## 2024-10-07 NOTE — PROGRESS NOTES
Subjective   Halie Guidry is a 84 y.o. female.     CC: Hospital F/U for Cellulitis    History of Present Illness     Pt returns today after recent hospitalization. That visit was as follows:    Date of Admission: 8/24/2024  Date of Discharge:  9/4/2024  Primary Care Physician: Napoleon Mead MD        Chief Complaint:   Leg Swelling        Discharge Diagnoses            Active Hospital Problems     Diagnosis   POA    **Cellulitis of right lower extremity [L03.115]   Yes    Bilateral cellulitis of lower leg [L03.116, L03.115]   Yes    Bilateral lower extremity edema [R60.0]   Yes    CKD (chronic kidney disease) stage 4, GFR 15-29 ml/min [N18.4]   Yes    Acute CHF [I50.9]   Yes    Anemia due to chronic kidney disease [N18.9, D63.1]   Yes    Primary malignant neuroendocrine tumor of pancreas [C7A.8]   Yes    Peripheral edema [R60.0]   Yes    Accelerated hypertension [I10]   Yes    Generalized weakness [R53.1]   Yes    Hyperlipidemia [E78.5]   Yes    Type 2 diabetes mellitus with hyperglycemia, with long-term current use of insulin [E11.65, Z79.4]   Not Applicable    Neuropathy [G62.9]   Yes    Benign essential hypertension [I10]   Yes       Resolved Hospital Problems   No resolved problems to display.         Hospital Course      84-year-old female presents the hospital with concern for leg cellulitis right greater than left with multiple leg wounds.     Cellulitis of the leg right greater than left with bilateral leg wounds:  Was initiated on IV bank and transitioned to doxycycline, IV cefazolin.  Wound care was consulted.  Swelling and erythema improved, does have residual erythema however felt to be secondary to chronic dermatitis rather than cellulitis.  Infection disease evaluated as WBC was mildly elevated, antibiotics were discontinued and as patient completed 9-day course of antibiotics and there was no signs of ongoing cellulitis.     Acute CHF, anasarca:  -Echocardiogram 08/26/2024 EF of 61.8%.   Nephrology was consulted, patient was aggressively treated with IV diuretics, and transitioned to p.o. Bumex 2 mg twice daily.  Continued Bumex discharge, Lasix was discontinued.        CKD 4:  -Baseline creatinine stable 2.  Protein to creatinine ratio 7.974, nephrotic range, likely secondary to diabetes.  Was treated with aggressive diuretics due to above.  Creatinine trended up, remained stable around primarily 2.4-2.6.  Discharged on p.o. Bumex 2 mg twice daily.  Patient has follow up with [facility] Wed Sept 25 11:45 am   -Creatinine was 2.62 at discharge.  Repeat BMP in 3-5 days to monitor renal function.     Anemia  -Chart review iron panel last year was consistent with iron deficient anemia  -Repeat iron panel 08/26/2024 mixed, consistent with anemia of chronic disease and iron deficiency anemia  -Initiated on p.o. iron every other day, continued at  discharge.  -Hemoglobin was stable around 9.       Type 2 diabetes: On high-dose insulin outpatient, continued to discharge.        Essential hypertension: Blood pressure was elevated on home regimen, BP meds were adjusted per nephrology.  Blood pressure improved, was on the softer side prior to discharge.  Hydralazine dose was decreased.  Continued bisoprolol stable 5 mg daily, hydralazine 50 mg 3 times daily, spironolactone 50 mg daily, terazosin 1 mg nightly.  Monitor BP and adjust medications as indicated.        Neuropathy:Continue Lyrica     Neuroendocrine tumor:   management currently with oncology.     Hyperlipidemia: Continue statin.  Stable.     Hypokalemia  -WNL     HypothyroidismTSH elevated at 21, free T4 and free T3 low 08/27/2024-Increased levothyroxine to 137 mcg daily from 112 mcg daily.Will need repeat labs in 4 weeks     At the time of discharge patient was told to take all medications as prescribed, keep all follow-up appointments, and call their doctor or return to the hospital with any worsening or concerning  symptoms.       Current outpatient and discharge medications have been reconciled for the patient.  Reviewed by: Napoleon Mead MD    Patient saw heme-onc on 9/16/2024 in follow-up.                  The following portions of the patient's history were reviewed and updated as appropriate: allergies, current medications, past family history, past medical history, past social history, past surgical history, and problem list.    Review of Systems   Constitutional:  Negative for activity change, chills and fever.   Respiratory:  Negative for cough.    Cardiovascular:  Negative for chest pain.   Psychiatric/Behavioral:  Negative for dysphoric mood.        Objective   Physical Exam  Vitals and nursing note reviewed.   Constitutional:       General: She is not in acute distress.     Appearance: She is well-developed.   Cardiovascular:      Rate and Rhythm: Normal rate and regular rhythm.      Heart sounds: Murmur heard.      Systolic murmur is present with a grade of 1/6.   Pulmonary:      Effort: Pulmonary effort is normal.      Breath sounds: Normal breath sounds.   Neurological:      Mental Status: She is alert and oriented to person, place, and time.   Psychiatric:         Behavior: Behavior normal.         Thought Content: Thought content normal.     Hospital records reviewed with pt confirming HPI.  Repeat thyroid function testing on 9/16/2024 returns as normal.          Assessment & Plan   Diagnoses and all orders for this visit:    1. Cellulitis of right lower extremity (Primary)    2. Chronic congestive heart failure, unspecified heart failure type  -     bumetanide (BUMEX) 2 MG tablet; Take 1 tablet by mouth 2 (Two) Times a Day.  Dispense: 180 tablet; Refill: 1  -     hydrALAZINE (APRESOLINE) 50 MG tablet; Take 1 tablet by mouth Every 8 (Eight) Hours.  Dispense: 270 tablet; Refill: 1  -     spironolactone (ALDACTONE) 50 MG tablet; Take 1 tablet by mouth Daily.  Dispense: 90 tablet; Refill: 1  -     terazosin  (HYTRIN) 1 MG capsule; Take 1 capsule by mouth Every Night.  Dispense: 90 capsule; Refill: 1    3. Stage 3a chronic kidney disease    4. Anemia due to stage 4 chronic kidney disease  -     CBC & Differential    5. Primary hypertension  -     Basic Metabolic Panel    6. Primary hypothyroidism  -     levothyroxine (SYNTHROID, LEVOTHROID) 137 MCG tablet; Take 1 tablet by mouth Every Morning.  Dispense: 90 tablet; Refill: 1    7. Hospital discharge follow-up    8. Accelerated hypertension  -     bisoprolol (ZEBeta) 5 MG tablet; Take 1 tablet by mouth Daily.  Dispense: 90 tablet; Refill: 3    9. Neuropathic pain  -     DULoxetine (CYMBALTA) 30 MG capsule; Take 1 capsule by mouth Daily.  Dispense: 90 capsule; Refill: 3    10. Anxiety  -     DULoxetine (CYMBALTA) 30 MG capsule; Take 1 capsule by mouth Daily.  Dispense: 90 capsule; Refill: 3    11. Non-seasonal allergic rhinitis due to other allergic trigger  -     fluticasone (FLONASE) 50 MCG/ACT nasal spray; Administer 2 sprays into the nostril(s) as directed by provider Daily.  Dispense: 16 g; Refill: 11    Pt not doing her compression wraps at this time, and is strongly encouraged to do so.  Patient encouraged to keep close follow-up with her multiple specialist.

## 2024-10-08 ENCOUNTER — OFFICE VISIT (OUTPATIENT)
Dept: FAMILY MEDICINE CLINIC | Facility: CLINIC | Age: 84
End: 2024-10-08
Payer: MEDICARE

## 2024-10-08 ENCOUNTER — PATIENT OUTREACH (OUTPATIENT)
Dept: CASE MANAGEMENT | Facility: OTHER | Age: 84
End: 2024-10-08
Payer: MEDICARE

## 2024-10-08 VITALS
RESPIRATION RATE: 16 BRPM | HEIGHT: 68 IN | SYSTOLIC BLOOD PRESSURE: 134 MMHG | TEMPERATURE: 97.6 F | OXYGEN SATURATION: 99 % | HEART RATE: 80 BPM | DIASTOLIC BLOOD PRESSURE: 86 MMHG | BODY MASS INDEX: 27.38 KG/M2

## 2024-10-08 DIAGNOSIS — I10 ACCELERATED HYPERTENSION: ICD-10-CM

## 2024-10-08 DIAGNOSIS — L03.116 BILATERAL CELLULITIS OF LOWER LEG: ICD-10-CM

## 2024-10-08 DIAGNOSIS — I50.9 ACUTE CONGESTIVE HEART FAILURE, UNSPECIFIED HEART FAILURE TYPE: Primary | ICD-10-CM

## 2024-10-08 DIAGNOSIS — L03.115 CELLULITIS OF RIGHT LOWER EXTREMITY: Primary | ICD-10-CM

## 2024-10-08 DIAGNOSIS — M79.2 NEUROPATHIC PAIN: ICD-10-CM

## 2024-10-08 DIAGNOSIS — I10 PRIMARY HYPERTENSION: ICD-10-CM

## 2024-10-08 DIAGNOSIS — J30.89 NON-SEASONAL ALLERGIC RHINITIS DUE TO OTHER ALLERGIC TRIGGER: ICD-10-CM

## 2024-10-08 DIAGNOSIS — D63.1 ANEMIA DUE TO STAGE 4 CHRONIC KIDNEY DISEASE: ICD-10-CM

## 2024-10-08 DIAGNOSIS — N18.4 ANEMIA DUE TO STAGE 4 CHRONIC KIDNEY DISEASE: ICD-10-CM

## 2024-10-08 DIAGNOSIS — F41.9 ANXIETY: ICD-10-CM

## 2024-10-08 DIAGNOSIS — N18.31 STAGE 3A CHRONIC KIDNEY DISEASE: ICD-10-CM

## 2024-10-08 DIAGNOSIS — L03.115 BILATERAL CELLULITIS OF LOWER LEG: ICD-10-CM

## 2024-10-08 DIAGNOSIS — Z09 HOSPITAL DISCHARGE FOLLOW-UP: ICD-10-CM

## 2024-10-08 DIAGNOSIS — I50.9 CHRONIC CONGESTIVE HEART FAILURE, UNSPECIFIED HEART FAILURE TYPE: ICD-10-CM

## 2024-10-08 DIAGNOSIS — E03.9 PRIMARY HYPOTHYROIDISM: ICD-10-CM

## 2024-10-08 PROCEDURE — 1159F MED LIST DOCD IN RCRD: CPT | Performed by: FAMILY MEDICINE

## 2024-10-08 PROCEDURE — 3075F SYST BP GE 130 - 139MM HG: CPT | Performed by: FAMILY MEDICINE

## 2024-10-08 PROCEDURE — 99214 OFFICE O/P EST MOD 30 MIN: CPT | Performed by: FAMILY MEDICINE

## 2024-10-08 PROCEDURE — 1125F AMNT PAIN NOTED PAIN PRSNT: CPT | Performed by: FAMILY MEDICINE

## 2024-10-08 PROCEDURE — 1160F RVW MEDS BY RX/DR IN RCRD: CPT | Performed by: FAMILY MEDICINE

## 2024-10-08 PROCEDURE — 3079F DIAST BP 80-89 MM HG: CPT | Performed by: FAMILY MEDICINE

## 2024-10-08 RX ORDER — DULOXETIN HYDROCHLORIDE 30 MG/1
30 CAPSULE, DELAYED RELEASE ORAL DAILY
Qty: 90 CAPSULE | Refills: 3 | Status: ON HOLD | OUTPATIENT
Start: 2024-10-08

## 2024-10-08 RX ORDER — HYDRALAZINE HYDROCHLORIDE 50 MG/1
50 TABLET, FILM COATED ORAL EVERY 8 HOURS SCHEDULED
Qty: 270 TABLET | Refills: 1 | Status: ON HOLD | OUTPATIENT
Start: 2024-10-08

## 2024-10-08 RX ORDER — SPIRONOLACTONE 50 MG/1
50 TABLET, FILM COATED ORAL DAILY
Qty: 90 TABLET | Refills: 1 | Status: ON HOLD | OUTPATIENT
Start: 2024-10-08

## 2024-10-08 RX ORDER — BISOPROLOL FUMARATE 5 MG/1
5 TABLET, FILM COATED ORAL DAILY
Qty: 90 TABLET | Refills: 3 | Status: ON HOLD | OUTPATIENT
Start: 2024-10-08

## 2024-10-08 RX ORDER — FLUTICASONE PROPIONATE 50 UG/1
2 SPRAY, METERED NASAL DAILY
Qty: 16 G | Refills: 11 | Status: ON HOLD | OUTPATIENT
Start: 2024-10-08

## 2024-10-08 RX ORDER — LEVOTHYROXINE SODIUM 137 UG/1
137 TABLET ORAL
Qty: 90 TABLET | Refills: 1 | Status: ON HOLD | OUTPATIENT
Start: 2024-10-08

## 2024-10-08 RX ORDER — BUMETANIDE 2 MG/1
2 TABLET ORAL 2 TIMES DAILY
Qty: 180 TABLET | Refills: 1 | Status: ON HOLD | OUTPATIENT
Start: 2024-10-08

## 2024-10-08 RX ORDER — TERAZOSIN 1 MG/1
1 CAPSULE ORAL NIGHTLY
Qty: 90 CAPSULE | Refills: 1 | Status: ON HOLD | OUTPATIENT
Start: 2024-10-08

## 2024-10-08 NOTE — OUTREACH NOTE
AMBULATORY CASE MANAGEMENT NOTE    Names and Relationships of Patient/Support Persons: Contact: Halie Guidry JANIE; Relationship: Self -     Adult Patient Profile  Questions/Answers      Flowsheet Row Most Recent Value   Cardiovascular Symptoms/Conditions heart failure   Cardiovascular Management Strategies diet modification, fluid modification, medication therapy, routine screening, activity, coping strategies   Cardiovascular Self-Management Outcome 3 (uncertain)   Cardiovascular Comment Patient need to keep legs elevated while using compression and keeping legs well moisturized to prevent further cellulitis.        CCM Interim Update    Met with patient and provider today.  Verified patient by name.    States that her legs are painful and she needs her refills, along with something for her leg pain, preferably NORCO.    Denies that she keeps legs elevated or wrapped continuously, d/t the discomfort of her legs.    States that her hands have been locking up, labs ordered prior to leaving today.    Further requesting wheelchair assistance follow up.  Previously ordered 8/1/24 with no further response from DME regarding wheelchair or measurements.    ACM completed physical assessment of bilateral lower legs.  Appearing moderately pink with severe dryness and cracking of the skin.  ACM did provide active listening while providing disease education and recommended treatment management.    ACM did complete review of all medications.  Electric W/C referral sent to [facility].    Next outreach has been scheduled.  - check on w/c  - keeping legs elevated in recliner with pillow support  - maintaining compression and moisturized            Education Documentation  No documentation found.        Carol THORNTON  Ambulatory Case Management    10/8/2024, 14:11 EDT

## 2024-10-09 ENCOUNTER — PATIENT OUTREACH (OUTPATIENT)
Dept: CASE MANAGEMENT | Facility: OTHER | Age: 84
End: 2024-10-09
Payer: MEDICARE

## 2024-10-09 DIAGNOSIS — L03.115 BILATERAL CELLULITIS OF LOWER LEG: ICD-10-CM

## 2024-10-09 DIAGNOSIS — I50.9 ACUTE CONGESTIVE HEART FAILURE, UNSPECIFIED HEART FAILURE TYPE: Primary | ICD-10-CM

## 2024-10-09 DIAGNOSIS — L03.116 BILATERAL CELLULITIS OF LOWER LEG: ICD-10-CM

## 2024-10-09 DIAGNOSIS — R53.1 GENERALIZED WEAKNESS: ICD-10-CM

## 2024-10-09 LAB
BASOPHILS # BLD AUTO: 0.1 10*3/MM3 (ref 0–0.2)
BASOPHILS NFR BLD AUTO: 0.9 % (ref 0–1.5)
BUN SERPL-MCNC: 80 MG/DL (ref 8–23)
BUN/CREAT SERPL: 28.6 (ref 7–25)
CALCIUM SERPL-MCNC: 8.9 MG/DL (ref 8.6–10.5)
CHLORIDE SERPL-SCNC: 96 MMOL/L (ref 98–107)
CO2 SERPL-SCNC: 17.1 MMOL/L (ref 22–29)
CREAT SERPL-MCNC: 2.8 MG/DL (ref 0.57–1)
EGFRCR SERPLBLD CKD-EPI 2021: 16.2 ML/MIN/1.73
EOSINOPHIL # BLD AUTO: 0.31 10*3/MM3 (ref 0–0.4)
EOSINOPHIL NFR BLD AUTO: 2.9 % (ref 0.3–6.2)
ERYTHROCYTE [DISTWIDTH] IN BLOOD BY AUTOMATED COUNT: 16 % (ref 12.3–15.4)
GLUCOSE SERPL-MCNC: 288 MG/DL (ref 65–99)
HCT VFR BLD AUTO: 32.1 % (ref 34–46.6)
HGB BLD-MCNC: 10.3 G/DL (ref 12–15.9)
IMM GRANULOCYTES # BLD AUTO: 0.05 10*3/MM3 (ref 0–0.05)
IMM GRANULOCYTES NFR BLD AUTO: 0.5 % (ref 0–0.5)
LYMPHOCYTES # BLD AUTO: 0.86 10*3/MM3 (ref 0.7–3.1)
LYMPHOCYTES NFR BLD AUTO: 8.2 % (ref 19.6–45.3)
MCH RBC QN AUTO: 27.8 PG (ref 26.6–33)
MCHC RBC AUTO-ENTMCNC: 32.1 G/DL (ref 31.5–35.7)
MCV RBC AUTO: 86.5 FL (ref 79–97)
MONOCYTES # BLD AUTO: 0.81 10*3/MM3 (ref 0.1–0.9)
MONOCYTES NFR BLD AUTO: 7.7 % (ref 5–12)
NEUTROPHILS # BLD AUTO: 8.42 10*3/MM3 (ref 1.7–7)
NEUTROPHILS NFR BLD AUTO: 79.8 % (ref 42.7–76)
NRBC BLD AUTO-RTO: 0 /100 WBC (ref 0–0.2)
PLATELET # BLD AUTO: 442 10*3/MM3 (ref 140–450)
POTASSIUM SERPL-SCNC: 5.4 MMOL/L (ref 3.5–5.2)
RBC # BLD AUTO: 3.71 10*6/MM3 (ref 3.77–5.28)
SODIUM SERPL-SCNC: 129 MMOL/L (ref 136–145)
WBC # BLD AUTO: 10.55 10*3/MM3 (ref 3.4–10.8)

## 2024-10-09 NOTE — OUTREACH NOTE
AMBULATORY CASE MANAGEMENT NOTE    Names and Relationships of Patient/Support Persons: Contact: Halie Guidry; Relationship: Self -     CCM Interim Update    Patient called ACM to review provider recommendations from office visit yesterday.    Review of labs and office visit completed.  Reassured and encouraged patient, review of all medications completed as well.  Recommendation to use warm compresses to relieve hand cramps.  Message sent to PCP regarding patient concern for further recommendations.    Discussed referral for electric wheel chair needs completed.    Next outreach scheduled.      Education Documentation  No documentation found.        Carol THORNTON  Ambulatory Case Management    10/9/2024, 11:15 EDT

## 2024-10-10 ENCOUNTER — TELEPHONE (OUTPATIENT)
Dept: FAMILY MEDICINE CLINIC | Facility: CLINIC | Age: 84
End: 2024-10-10
Payer: MEDICARE

## 2024-10-10 ENCOUNTER — HOSPITAL ENCOUNTER (INPATIENT)
Facility: HOSPITAL | Age: 84
LOS: 8 days | Discharge: HOME | DRG: 689 | End: 2024-10-19
Attending: EMERGENCY MEDICINE | Admitting: INTERNAL MEDICINE
Payer: MEDICARE

## 2024-10-10 DIAGNOSIS — N18.9 CHRONIC RENAL IMPAIRMENT, UNSPECIFIED CKD STAGE: ICD-10-CM

## 2024-10-10 DIAGNOSIS — N39.0 ACUTE UTI: Primary | ICD-10-CM

## 2024-10-10 DIAGNOSIS — I87.2 VENOUS STASIS DERMATITIS OF BOTH LOWER EXTREMITIES: ICD-10-CM

## 2024-10-10 DIAGNOSIS — E11.65 POORLY CONTROLLED DIABETES MELLITUS: ICD-10-CM

## 2024-10-10 DIAGNOSIS — E11.65 TYPE 2 DIABETES MELLITUS WITH HYPERGLYCEMIA, WITH LONG-TERM CURRENT USE OF INSULIN: ICD-10-CM

## 2024-10-10 DIAGNOSIS — Z74.09 DECREASED MOBILITY AND ENDURANCE: ICD-10-CM

## 2024-10-10 DIAGNOSIS — Z79.4 TYPE 2 DIABETES MELLITUS WITH HYPERGLYCEMIA, WITH LONG-TERM CURRENT USE OF INSULIN: ICD-10-CM

## 2024-10-10 LAB
ALBUMIN SERPL-MCNC: 3.5 G/DL (ref 3.5–5.2)
ALBUMIN/GLOB SERPL: 0.8 G/DL
ALP SERPL-CCNC: 157 U/L (ref 39–117)
ALT SERPL W P-5'-P-CCNC: 7 U/L (ref 1–33)
ANION GAP SERPL CALCULATED.3IONS-SCNC: 13 MMOL/L (ref 5–15)
AST SERPL-CCNC: 10 U/L (ref 1–32)
ATMOSPHERIC PRESS: 754 MMHG
B-OH-BUTYR SERPL-SCNC: 0.12 MMOL/L (ref 0.02–0.27)
BACTERIA UR QL AUTO: ABNORMAL /HPF
BASE EXCESS BLDV CALC-SCNC: -8.5 MMOL/L (ref -2–2)
BASOPHILS # BLD AUTO: 0.08 10*3/MM3 (ref 0–0.2)
BASOPHILS NFR BLD AUTO: 0.9 % (ref 0–1.5)
BILIRUB SERPL-MCNC: 0.3 MG/DL (ref 0–1.2)
BILIRUB UR QL STRIP: NEGATIVE
BUN SERPL-MCNC: 83 MG/DL (ref 8–23)
BUN/CREAT SERPL: 28.5 (ref 7–25)
CALCIUM SPEC-SCNC: 8.7 MG/DL (ref 8.6–10.5)
CHLORIDE SERPL-SCNC: 99 MMOL/L (ref 98–107)
CLARITY UR: CLEAR
CO2 BLDA-SCNC: 16.9 MMOL/L (ref 23–27)
CO2 SERPL-SCNC: 18 MMOL/L (ref 22–29)
COLOR UR: YELLOW
CREAT SERPL-MCNC: 2.91 MG/DL (ref 0.57–1)
DEPRECATED RDW RBC AUTO: 52.1 FL (ref 37–54)
DEVICE COMMENT: ABNORMAL
EGFRCR SERPLBLD CKD-EPI 2021: 15.4 ML/MIN/1.73
EOSINOPHIL # BLD AUTO: 0.26 10*3/MM3 (ref 0–0.4)
EOSINOPHIL NFR BLD AUTO: 2.9 % (ref 0.3–6.2)
ERYTHROCYTE [DISTWIDTH] IN BLOOD BY AUTOMATED COUNT: 16 % (ref 12.3–15.4)
GLOBULIN UR ELPH-MCNC: 4.3 GM/DL
GLUCOSE BLDC GLUCOMTR-MCNC: 363 MG/DL (ref 70–130)
GLUCOSE BLDC GLUCOMTR-MCNC: 366 MG/DL (ref 70–130)
GLUCOSE BLDC GLUCOMTR-MCNC: 474 MG/DL (ref 70–130)
GLUCOSE BLDC GLUCOMTR-MCNC: 548 MG/DL (ref 70–130)
GLUCOSE SERPL-MCNC: 550 MG/DL (ref 65–99)
GLUCOSE UR STRIP-MCNC: ABNORMAL MG/DL
HCO3 BLDV-SCNC: 16 MMOL/L (ref 22–28)
HCT VFR BLD AUTO: 30.3 % (ref 34–46.6)
HGB BLD-MCNC: 9.4 G/DL (ref 12–15.9)
HGB UR QL STRIP.AUTO: ABNORMAL
HYALINE CASTS UR QL AUTO: ABNORMAL /LPF
IMM GRANULOCYTES # BLD AUTO: 0.04 10*3/MM3 (ref 0–0.05)
IMM GRANULOCYTES NFR BLD AUTO: 0.4 % (ref 0–0.5)
KETONES UR QL STRIP: NEGATIVE
LEUKOCYTE ESTERASE UR QL STRIP.AUTO: ABNORMAL
LYMPHOCYTES # BLD AUTO: 0.7 10*3/MM3 (ref 0.7–3.1)
LYMPHOCYTES NFR BLD AUTO: 7.7 % (ref 19.6–45.3)
MCH RBC QN AUTO: 27.8 PG (ref 26.6–33)
MCHC RBC AUTO-ENTMCNC: 31 G/DL (ref 31.5–35.7)
MCV RBC AUTO: 89.6 FL (ref 79–97)
MODALITY: ABNORMAL
MONOCYTES # BLD AUTO: 0.5 10*3/MM3 (ref 0.1–0.9)
MONOCYTES NFR BLD AUTO: 5.5 % (ref 5–12)
NEUTROPHILS NFR BLD AUTO: 7.52 10*3/MM3 (ref 1.7–7)
NEUTROPHILS NFR BLD AUTO: 82.6 % (ref 42.7–76)
NITRITE UR QL STRIP: NEGATIVE
NRBC BLD AUTO-RTO: 0 /100 WBC (ref 0–0.2)
PCO2 BLDV: 28.6 MM HG (ref 41–51)
PH BLDV: 7.36 PH UNITS (ref 7.31–7.41)
PH UR STRIP.AUTO: 6 [PH] (ref 5–8)
PLATELET # BLD AUTO: 348 10*3/MM3 (ref 140–450)
PMV BLD AUTO: 9.8 FL (ref 6–12)
PO2 BLDV: 33.2 MM HG (ref 35–45)
POTASSIUM SERPL-SCNC: 5.2 MMOL/L (ref 3.5–5.2)
PROT SERPL-MCNC: 7.8 G/DL (ref 6–8.5)
PROT UR QL STRIP: ABNORMAL
RBC # BLD AUTO: 3.38 10*6/MM3 (ref 3.77–5.28)
RBC # UR STRIP: ABNORMAL /HPF
REF LAB TEST METHOD: ABNORMAL
SAO2 % BLDCOV: 62.6 % (ref 45–75)
SODIUM SERPL-SCNC: 130 MMOL/L (ref 136–145)
SP GR UR STRIP: 1.01 (ref 1–1.03)
SQUAMOUS #/AREA URNS HPF: ABNORMAL /HPF
UROBILINOGEN UR QL STRIP: ABNORMAL
WBC # UR STRIP: ABNORMAL /HPF
WBC NRBC COR # BLD AUTO: 9.1 10*3/MM3 (ref 3.4–10.8)

## 2024-10-10 PROCEDURE — 82948 REAGENT STRIP/BLOOD GLUCOSE: CPT

## 2024-10-10 PROCEDURE — 80053 COMPREHEN METABOLIC PANEL: CPT | Performed by: EMERGENCY MEDICINE

## 2024-10-10 PROCEDURE — 36415 COLL VENOUS BLD VENIPUNCTURE: CPT

## 2024-10-10 PROCEDURE — 99285 EMERGENCY DEPT VISIT HI MDM: CPT

## 2024-10-10 PROCEDURE — 87181 SC STD AGAR DILUTION PER AGT: CPT | Performed by: EMERGENCY MEDICINE

## 2024-10-10 PROCEDURE — 82803 BLOOD GASES ANY COMBINATION: CPT | Performed by: EMERGENCY MEDICINE

## 2024-10-10 PROCEDURE — 82010 KETONE BODYS QUAN: CPT | Performed by: EMERGENCY MEDICINE

## 2024-10-10 PROCEDURE — 25010000002 MORPHINE PER 10 MG: Performed by: NURSE PRACTITIONER

## 2024-10-10 PROCEDURE — 85025 COMPLETE CBC W/AUTO DIFF WBC: CPT | Performed by: EMERGENCY MEDICINE

## 2024-10-10 PROCEDURE — 25010000002 CEFTRIAXONE PER 250 MG: Performed by: EMERGENCY MEDICINE

## 2024-10-10 PROCEDURE — G0378 HOSPITAL OBSERVATION PER HR: HCPCS

## 2024-10-10 PROCEDURE — 87077 CULTURE AEROBIC IDENTIFY: CPT | Performed by: EMERGENCY MEDICINE

## 2024-10-10 PROCEDURE — 63710000001 INSULIN GLARGINE PER 5 UNITS: Performed by: INTERNAL MEDICINE

## 2024-10-10 PROCEDURE — 63710000001 INSULIN LISPRO (HUMAN) PER 5 UNITS: Performed by: INTERNAL MEDICINE

## 2024-10-10 PROCEDURE — 25010000002 HYDRALAZINE PER 20 MG: Performed by: INTERNAL MEDICINE

## 2024-10-10 PROCEDURE — 63710000001 INSULIN REGULAR HUMAN PER 5 UNITS: Performed by: EMERGENCY MEDICINE

## 2024-10-10 PROCEDURE — 87086 URINE CULTURE/COLONY COUNT: CPT | Performed by: EMERGENCY MEDICINE

## 2024-10-10 PROCEDURE — 81001 URINALYSIS AUTO W/SCOPE: CPT | Performed by: EMERGENCY MEDICINE

## 2024-10-10 PROCEDURE — 87186 SC STD MICRODIL/AGAR DIL: CPT | Performed by: EMERGENCY MEDICINE

## 2024-10-10 PROCEDURE — 25810000003 SODIUM CHLORIDE 0.9 % SOLUTION: Performed by: EMERGENCY MEDICINE

## 2024-10-10 RX ORDER — PREGABALIN 75 MG/1
75 CAPSULE ORAL 2 TIMES DAILY
Status: DISCONTINUED | OUTPATIENT
Start: 2024-10-10 | End: 2024-10-12

## 2024-10-10 RX ORDER — ONDANSETRON 2 MG/ML
4 INJECTION INTRAMUSCULAR; INTRAVENOUS EVERY 6 HOURS PRN
Status: DISCONTINUED | OUTPATIENT
Start: 2024-10-10 | End: 2024-10-19 | Stop reason: HOSPADM

## 2024-10-10 RX ORDER — ACETAMINOPHEN 160 MG/5ML
650 SOLUTION ORAL EVERY 4 HOURS PRN
Status: DISCONTINUED | OUTPATIENT
Start: 2024-10-10 | End: 2024-10-19 | Stop reason: HOSPADM

## 2024-10-10 RX ORDER — SODIUM CHLORIDE 0.9 % (FLUSH) 0.9 %
10 SYRINGE (ML) INJECTION AS NEEDED
Status: DISCONTINUED | OUTPATIENT
Start: 2024-10-10 | End: 2024-10-19 | Stop reason: HOSPADM

## 2024-10-10 RX ORDER — INSULIN LISPRO 100 [IU]/ML
2-9 INJECTION, SOLUTION INTRAVENOUS; SUBCUTANEOUS
Status: DISCONTINUED | OUTPATIENT
Start: 2024-10-10 | End: 2024-10-19 | Stop reason: HOSPADM

## 2024-10-10 RX ORDER — SPIRONOLACTONE 50 MG/1
50 TABLET, FILM COATED ORAL DAILY
Status: DISCONTINUED | OUTPATIENT
Start: 2024-10-10 | End: 2024-10-12

## 2024-10-10 RX ORDER — FERROUS SULFATE 325(65) MG
325 TABLET ORAL EVERY OTHER DAY
Status: DISCONTINUED | OUTPATIENT
Start: 2024-10-11 | End: 2024-10-19 | Stop reason: HOSPADM

## 2024-10-10 RX ORDER — AMOXICILLIN 250 MG
2 CAPSULE ORAL 2 TIMES DAILY PRN
Status: DISCONTINUED | OUTPATIENT
Start: 2024-10-10 | End: 2024-10-19 | Stop reason: HOSPADM

## 2024-10-10 RX ORDER — LEVOTHYROXINE SODIUM 137 UG/1
137 TABLET ORAL
Status: DISCONTINUED | OUTPATIENT
Start: 2024-10-11 | End: 2024-10-19 | Stop reason: HOSPADM

## 2024-10-10 RX ORDER — BUMETANIDE 2 MG/1
2 TABLET ORAL
Status: DISCONTINUED | OUTPATIENT
Start: 2024-10-10 | End: 2024-10-12

## 2024-10-10 RX ORDER — DEXTROSE MONOHYDRATE 25 G/50ML
25 INJECTION, SOLUTION INTRAVENOUS
Status: DISCONTINUED | OUTPATIENT
Start: 2024-10-10 | End: 2024-10-19 | Stop reason: HOSPADM

## 2024-10-10 RX ORDER — ACETAMINOPHEN 650 MG/1
650 SUPPOSITORY RECTAL EVERY 4 HOURS PRN
Status: DISCONTINUED | OUTPATIENT
Start: 2024-10-10 | End: 2024-10-19 | Stop reason: HOSPADM

## 2024-10-10 RX ORDER — TERAZOSIN 1 MG/1
1 CAPSULE ORAL NIGHTLY
Status: DISCONTINUED | OUTPATIENT
Start: 2024-10-10 | End: 2024-10-19 | Stop reason: HOSPADM

## 2024-10-10 RX ORDER — INSULIN LISPRO 100 [IU]/ML
2-9 INJECTION, SOLUTION INTRAVENOUS; SUBCUTANEOUS
Status: DISCONTINUED | OUTPATIENT
Start: 2024-10-10 | End: 2024-10-10

## 2024-10-10 RX ORDER — ATORVASTATIN CALCIUM 80 MG/1
80 TABLET, FILM COATED ORAL NIGHTLY
Status: DISCONTINUED | OUTPATIENT
Start: 2024-10-10 | End: 2024-10-19 | Stop reason: HOSPADM

## 2024-10-10 RX ORDER — ACETAMINOPHEN 325 MG/1
650 TABLET ORAL EVERY 4 HOURS PRN
Status: DISCONTINUED | OUTPATIENT
Start: 2024-10-10 | End: 2024-10-19 | Stop reason: HOSPADM

## 2024-10-10 RX ORDER — HYDRALAZINE HYDROCHLORIDE 20 MG/ML
10 INJECTION INTRAMUSCULAR; INTRAVENOUS EVERY 6 HOURS PRN
Status: DISCONTINUED | OUTPATIENT
Start: 2024-10-10 | End: 2024-10-19 | Stop reason: HOSPADM

## 2024-10-10 RX ORDER — BISACODYL 5 MG/1
5 TABLET, DELAYED RELEASE ORAL DAILY PRN
Status: DISCONTINUED | OUTPATIENT
Start: 2024-10-10 | End: 2024-10-19 | Stop reason: HOSPADM

## 2024-10-10 RX ORDER — DULOXETIN HYDROCHLORIDE 30 MG/1
30 CAPSULE, DELAYED RELEASE ORAL DAILY
Status: DISCONTINUED | OUTPATIENT
Start: 2024-10-10 | End: 2024-10-19 | Stop reason: HOSPADM

## 2024-10-10 RX ORDER — NICOTINE POLACRILEX 4 MG
15 LOZENGE BUCCAL
Status: DISCONTINUED | OUTPATIENT
Start: 2024-10-10 | End: 2024-10-19 | Stop reason: HOSPADM

## 2024-10-10 RX ORDER — PANTOPRAZOLE SODIUM 40 MG/1
40 TABLET, DELAYED RELEASE ORAL
Status: DISCONTINUED | OUTPATIENT
Start: 2024-10-11 | End: 2024-10-19 | Stop reason: HOSPADM

## 2024-10-10 RX ORDER — AMMONIUM LACTATE 12 G/100G
1 CREAM TOPICAL 2 TIMES DAILY
Status: DISCONTINUED | OUTPATIENT
Start: 2024-10-10 | End: 2024-10-15

## 2024-10-10 RX ORDER — IBUPROFEN 600 MG/1
1 TABLET ORAL
Status: DISCONTINUED | OUTPATIENT
Start: 2024-10-10 | End: 2024-10-19 | Stop reason: HOSPADM

## 2024-10-10 RX ORDER — ONDANSETRON 4 MG/1
4 TABLET, ORALLY DISINTEGRATING ORAL EVERY 6 HOURS PRN
Status: DISCONTINUED | OUTPATIENT
Start: 2024-10-10 | End: 2024-10-19 | Stop reason: HOSPADM

## 2024-10-10 RX ORDER — BISOPROLOL FUMARATE 5 MG/1
5 TABLET, FILM COATED ORAL DAILY
Status: DISCONTINUED | OUTPATIENT
Start: 2024-10-10 | End: 2024-10-19 | Stop reason: HOSPADM

## 2024-10-10 RX ORDER — BISACODYL 10 MG
10 SUPPOSITORY, RECTAL RECTAL DAILY PRN
Status: DISCONTINUED | OUTPATIENT
Start: 2024-10-10 | End: 2024-10-19 | Stop reason: HOSPADM

## 2024-10-10 RX ORDER — MORPHINE SULFATE 2 MG/ML
2 INJECTION, SOLUTION INTRAMUSCULAR; INTRAVENOUS ONCE
Status: COMPLETED | OUTPATIENT
Start: 2024-10-10 | End: 2024-10-10

## 2024-10-10 RX ORDER — POLYETHYLENE GLYCOL 3350 17 G/17G
17 POWDER, FOR SOLUTION ORAL DAILY PRN
Status: DISCONTINUED | OUTPATIENT
Start: 2024-10-10 | End: 2024-10-19 | Stop reason: HOSPADM

## 2024-10-10 RX ORDER — HYDRALAZINE HYDROCHLORIDE 50 MG/1
50 TABLET, FILM COATED ORAL EVERY 8 HOURS SCHEDULED
Status: DISCONTINUED | OUTPATIENT
Start: 2024-10-10 | End: 2024-10-19 | Stop reason: HOSPADM

## 2024-10-10 RX ADMIN — SPIRONOLACTONE 50 MG: 50 TABLET, FILM COATED ORAL at 20:48

## 2024-10-10 RX ADMIN — BISOPROLOL FUMARATE 5 MG: 5 TABLET, FILM COATED ORAL at 20:48

## 2024-10-10 RX ADMIN — ATORVASTATIN CALCIUM 80 MG: 80 TABLET, FILM COATED ORAL at 20:48

## 2024-10-10 RX ADMIN — CEFTRIAXONE SODIUM 1000 MG: 1 INJECTION, POWDER, FOR SOLUTION INTRAMUSCULAR; INTRAVENOUS at 15:03

## 2024-10-10 RX ADMIN — PREGABALIN 75 MG: 75 CAPSULE ORAL at 20:48

## 2024-10-10 RX ADMIN — TERAZOSIN 1 MG: 1 CAPSULE ORAL at 20:48

## 2024-10-10 RX ADMIN — BUMETANIDE 2 MG: 2 TABLET ORAL at 20:48

## 2024-10-10 RX ADMIN — SODIUM CHLORIDE 1000 ML: 9 INJECTION, SOLUTION INTRAVENOUS at 12:53

## 2024-10-10 RX ADMIN — DULOXETINE HYDROCHLORIDE 30 MG: 30 CAPSULE, DELAYED RELEASE ORAL at 20:47

## 2024-10-10 RX ADMIN — INSULIN GLARGINE 20 UNITS: 100 INJECTION, SOLUTION SUBCUTANEOUS at 20:58

## 2024-10-10 RX ADMIN — INSULIN LISPRO 8 UNITS: 100 INJECTION, SOLUTION INTRAVENOUS; SUBCUTANEOUS at 17:47

## 2024-10-10 RX ADMIN — ACETAMINOPHEN 325MG 650 MG: 325 TABLET ORAL at 18:25

## 2024-10-10 RX ADMIN — MORPHINE SULFATE 2 MG: 2 INJECTION, SOLUTION INTRAMUSCULAR; INTRAVENOUS at 20:47

## 2024-10-10 RX ADMIN — Medication 2.5 MG: at 22:00

## 2024-10-10 RX ADMIN — INSULIN HUMAN 10 UNITS: 100 INJECTION, SOLUTION PARENTERAL at 13:09

## 2024-10-10 RX ADMIN — HYDRALAZINE HYDROCHLORIDE 10 MG: 20 INJECTION INTRAMUSCULAR; INTRAVENOUS at 16:49

## 2024-10-10 RX ADMIN — LIDOCAINE 1 APPLICATION: 4 CREAM TOPICAL at 20:49

## 2024-10-10 RX ADMIN — HYDRALAZINE HYDROCHLORIDE 50 MG: 50 TABLET ORAL at 21:00

## 2024-10-10 RX ADMIN — INSULIN LISPRO 8 UNITS: 100 INJECTION, SOLUTION INTRAVENOUS; SUBCUTANEOUS at 20:58

## 2024-10-10 NOTE — ED NOTES
..Nursing report ED to floor  Halie Guidry  84 y.o.  female    HPI :  HPI (Adult)  Stated Reason for Visit: hyperglycemia  History Obtained From: EMS    Chief Complaint  Chief Complaint   Patient presents with    Hyperglycemia       Admitting doctor:   Kim Barnard MD    Admitting diagnosis:   The primary encounter diagnosis was Acute UTI. Diagnoses of Type 2 diabetes mellitus with hyperglycemia, with long-term current use of insulin, Poorly controlled diabetes mellitus, Venous stasis dermatitis of both lower extremities, and Chronic renal impairment, unspecified CKD stage were also pertinent to this visit.    Code status:   Current Code Status       Date Active Code Status Order ID Comments User Context       10/10/2024 1454 CPR (Attempt to Resuscitate) [id number]  Kim Barnard MD ED        Question Answer    Code Status (Patient has no pulse and is not breathing) CPR (Attempt to Resuscitate)    Medical Interventions (Patient has pulse or is breathing) Full                    Allergies:   Sulfa antibiotics    Isolation:   No active isolations    Intake and Output    Intake/Output Summary (Last 24 hours) at 10/10/2024 1508  Last data filed at 10/10/2024 1425  Gross per 24 hour   Intake 1000 ml   Output --   Net 1000 ml       Weight:   There were no vitals filed for this visit.    Most recent vitals:   Vitals:    10/10/24 1401 10/10/24 1402 10/10/24 1430 10/10/24 1431   BP: 174/83   (!) 183/87   Pulse:  70 72    Resp:       Temp:       TempSrc:       SpO2:  95% 97%        Active LDAs/IV Access:   Lines, Drains & Airways       Active LDAs       Name Placement date Placement time Site Days    Peripheral IV 10/10/24 1250 Left Antecubital 10/10/24  1250  Antecubital  less than 1                    Labs (abnormal labs have a star):   Labs Reviewed   COMPREHENSIVE METABOLIC PANEL - Abnormal; Notable for the following components:       Result Value    Glucose 550 (*)     BUN 83 (*)     Creatinine  2.91 (*)     Sodium 130 (*)     CO2 18.0 (*)     Alkaline Phosphatase 157 (*)     BUN/Creatinine Ratio 28.5 (*)     eGFR 15.4 (*)     All other components within normal limits    Narrative:     GFR Normal >60  Chronic Kidney Disease <60  Kidney Failure <15    The GFR formula is only valid for adults with stable renal function between ages 18 and 70.   BLOOD GAS, VENOUS - Abnormal; Notable for the following components:    pCO2, Venous 28.6 (*)     pO2, Venous 33.2 (*)     HCO3, Venous 16.0 (*)     Base Excess, Venous -8.5 (*)     CO2 Content 16.9 (*)     All other components within normal limits   URINALYSIS W/ MICROSCOPIC IF INDICATED (NO CULTURE) - Abnormal; Notable for the following components:    Glucose, UA >=1000 mg/dL (3+) (*)     Blood, UA Trace (*)     Protein,  mg/dL (2+) (*)     Leuk Esterase, UA Small (1+) (*)     All other components within normal limits   CBC WITH AUTO DIFFERENTIAL - Abnormal; Notable for the following components:    RBC 3.38 (*)     Hemoglobin 9.4 (*)     Hematocrit 30.3 (*)     MCHC 31.0 (*)     RDW 16.0 (*)     Neutrophil % 82.6 (*)     Lymphocyte % 7.7 (*)     Neutrophils, Absolute 7.52 (*)     All other components within normal limits   URINALYSIS, MICROSCOPIC ONLY - Abnormal; Notable for the following components:    WBC, UA 21-50 (*)     Bacteria, UA 4+ (*)     All other components within normal limits   POCT GLUCOSE FINGERSTICK - Abnormal; Notable for the following components:    Glucose 548 (*)     All other components within normal limits   POCT GLUCOSE FINGERSTICK - Abnormal; Notable for the following components:    Glucose 474 (*)     All other components within normal limits   BETA HYDROXYBUTYRATE QUANTITATIVE - Normal    Narrative:     In the assessment of possible diabetic ketoacidosis, the test should be interpreted along with other clinical and laboratory findings.  A level greater than 1 mmol/L should require further evaluation and levels of more than 3 mmol/L  require immediate medical review.   URINE CULTURE   POCT GLUCOSE FINGERSTICK   POCT GLUCOSE FINGERSTICK   CBC AND DIFFERENTIAL    Narrative:     The following orders were created for panel order CBC & Differential.  Procedure                               Abnormality         Status                     ---------                               -----------         ------                     CBC Auto Differential[[id number]]        Abnormal            Final result                 Please view results for these tests on the individual orders.   KETONE BODIES SERUM    Narrative:     The following orders were created for panel order Ketone Bodies, Serum (Not performed at [place]).  Procedure                               Abnormality         Status                     ---------                               -----------         ------                     Beta Hydroxybutyrate [place]...[[id number]]  Normal              Final result                 Please view results for these tests on the individual orders.       EKG:   No orders to display       Meds given in ED:   Medications   sodium chloride 0.9 % flush 10 mL (has no administration in time range)   cefTRIAXone (ROCEPHIN) 1,000 mg in sodium chloride 0.9 % 100 mL MBP (1,000 mg Intravenous New Bag 10/10/24 1503)   acetaminophen (TYLENOL) tablet 650 mg (has no administration in time range)     Or   acetaminophen (TYLENOL) 160 MG/5ML oral solution 650 mg (has no administration in time range)     Or   acetaminophen (TYLENOL) suppository 650 mg (has no administration in time range)   ondansetron ODT (ZOFRAN-ODT) disintegrating tablet 4 mg (has no administration in time range)     Or   ondansetron (ZOFRAN) injection 4 mg (has no administration in time range)   melatonin tablet 2.5 mg (has no administration in time range)   sennosides-docusate (PERICOLACE) 8.6-50 MG per tablet 2 tablet (has no administration in time range)     And   polyethylene glycol (MIRALAX) packet 17 g (has no  administration in time range)     And   bisacodyl (DULCOLAX) EC tablet 5 mg (has no administration in time range)     And   bisacodyl (DULCOLAX) suppository 10 mg (has no administration in time range)   sodium chloride 0.9 % bolus 1,000 mL (0 mL Intravenous Stopped 10/10/24 1425)   insulin regular (humuLIN R,novoLIN R) injection 10 Units (10 Units Subcutaneous Given 10/10/24 1309)       Imaging results:  No radiology results for the last day    Ambulatory status:   - wheelchair    Social issues:   Social History     Socioeconomic History    Marital status:     Number of children: 3   Tobacco Use    Smoking status: Former     Current packs/day: 0.00     Average packs/day: 1 pack/day for 40.0 years (40.0 ttl pk-yrs)     Types: Cigarettes     Start date:      Quit date:      Years since quittin.7    Smokeless tobacco: Never   Vaping Use    Vaping status: Never Used   Substance and Sexual Activity    Alcohol use: No    Drug use: No    Sexual activity: Defer       Peripheral Neurovascular  Peripheral Neurovascular (Adult)  Peripheral Neurovascular WDL: WDL    Neuro Cognitive  Neuro Cognitive (Adult)  Cognitive/Neuro/Behavioral WDL: WDL, speech  Speech: logical, clear  Pupils  Pupil PERRLA: yes    Learning  Learning Assessment (Adult)  Learning Readiness and Ability: no barriers identified    Respiratory  Respiratory WDL  Respiratory WDL: WDL, rhythm/pattern  Rhythm/Pattern, Respiratory: depth regular, pattern regular, unlabored, no shortness of breath reported    Abdominal Pain       Pain Assessments  Pain (Adult)  (0-10) Pain Rating: Rest: 0    NIH Stroke Scale       Sandy Sanabria RN  10/10/24 15:08 EDT

## 2024-10-10 NOTE — TELEPHONE ENCOUNTER
Spoke with Carol patient home health nurse she stated that patient blood sugar has been over 400 for the last 6-7 days. I advise Carol to send the patient to the ER for an evaluation

## 2024-10-10 NOTE — TELEPHONE ENCOUNTER
Left Belle a voicemail to inform her per Dr. Mead    Patient's most recent magnesium and   potassium levels, completed very recently,   were actually high normal to high.    Supplementation at this point would not   be necessary.   Instructed to contact our office for further questions or concerns.

## 2024-10-10 NOTE — Clinical Note
Level of Care: Telemetry [5]   Diagnosis: Acute UTI [421227]   Admitting Physician: CASE VALDEZ [[id number]]   Attending Physician: CASE VALDEZ [[id number]]   Is patient appropriate for Inpatient Observation Unit?: Yes [1]

## 2024-10-10 NOTE — PLAN OF CARE
Problem: Adult Inpatient Plan of Care  Goal: Absence of Hospital-Acquired Illness or Injury  Intervention: Identify and Manage Fall Risk  Description: Perform standard risk assessment on admission using a validated tool or comprehensive approach appropriate to the patient; reassess fall risk frequently, with change in status or transfer to another level of care.  Communicate fall injury risk to interprofessional healthcare team.  Determine need for increased observation, equipment and environmental modification, such as low bed, signage and supportive, nonskid footwear.  Adjust safety measures to individual developmental age, stage and identified risk factors.  Reinforce the importance of safety and physical activity with patient and family.  Perform regular intentional rounding to assess need for position change, pain assessment and personal needs, including assistance with toileting.  Recent Flowsheet Documentation  Taken 10/10/2024 1815 by Dhiraj Epstein, RN  Safety Promotion/Fall Prevention:   activity supervised   clutter free environment maintained   assistive device/personal items within reach   safety round/check completed   room organization consistent  Taken 10/10/2024 1645 by Dhiraj Epstein, RN  Safety Promotion/Fall Prevention:   activity supervised   assistive device/personal items within reach   clutter free environment maintained   safety round/check completed   room organization consistent   Goal Outcome Evaluation:         Patient admitted from ED for uti/ hyperglycemia. Patient blood pressure and glucose elevated when arriving to the floor. PRN blood pressure medication ordered and given with relief, and sliding scale insulin given per order.Patient Ax4, call light in reach.

## 2024-10-10 NOTE — ED PROVIDER NOTES
" EMERGENCY DEPARTMENT ENCOUNTER    Room Number:  14/14  PCP: Napoleon Mead MD  Historian: Patient, EMS    I initially evaluated the patient at 12:11 PM    HPI:  Chief Complaint: Elevated blood sugar  A complete HPI/ROS/PMH/PSH/SH/FH are unobtainable due to: Nothing  Context: Halie Guidry is a 84 y.o. female with a medical history of type 2 diabetes, hyperlipidemia, hypertension, hypothyroidism, peripheral neuropathy who presents to the ED c/o acute elevated blood sugar for the past 5 to 6 days.  She checked her glucose this morning and it read \"high\".  She has been compliant with her insulin regimen.  However, she did not take insulin today.  As for her diet, she says \"I eat what ever they bring me at my facility\".  She reports increased thirst, increased urination and some burning with urination for the past several days..  Denies fever, chills, chest pain, shortness of breath, abdominal pain, vomiting, or diarrhea.  She also complains of redness of both lower legs since being hospitalized here in August for bilateral leg cellulitis.  She is not currently on antibiotics.  She has chronic leg swelling but reports that the swelling has improved recently.  She is on a diuretic..  She also complains of intermittent cramping in her legs and hands for the past few weeks.            PAST MEDICAL HISTORY  Active Ambulatory Problems     Diagnosis Date Noted    Compression fracture 04/22/2009    Lumbar degenerative disc disease 07/05/2012    Type 2 diabetes mellitus with hyperglycemia, with long-term current use of insulin     Hyperlipidemia     Benign essential hypertension 08/20/2014    Primary hypothyroidism     Neuropathy     Osteoporosis 09/09/2015    Proteinuria 02/28/2012    Tobacco abuse 08/22/2013    Generalized weakness 05/27/2017    Spinal stenosis 05/27/2017    Scoliosis 05/27/2017    Arthritis 05/27/2017    VBI (vertebrobasilar insufficiency) 05/28/2017    Orthostatic hypotension 05/28/2017    Autonomic " neuropathy due to secondary diabetes mellitus 05/28/2017    Severe hypothyroidism 05/30/2017    Noncompliance with medication regimen 05/30/2017    Vitamin D deficiency disease 06/01/2017    Tremor 01/09/2018    Seizure 05/21/2019    Thoracic degenerative disc disease 01/27/2020    Hyperglycemia 12/02/2021    Type II diabetes mellitus, uncontrolled 12/02/2021    Lower abdominal pain 02/23/2022    Transaminitis 02/23/2022    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    Choledocholithiasis 02/23/2022    Hypokalemia 02/24/2022    Hypoxia 02/24/2022    Generalized abdominal pain 03/26/2022    History of Clostridium difficile infection 03/26/2022    History of ERCP 03/26/2022    Hypomagnesemia 03/28/2022    Anxiety disorder 05/27/2022    Neuropathic pain 05/27/2022    Intertrigo 05/27/2022    Weakness of both lower extremities 06/24/2022    Accelerated hypertension 09/01/2022    Acute cystitis without hematuria 09/06/2022    Left lower lobe pneumonia 11/04/2022    Cellulitis and abscess of left lower extremity 05/15/2023    Benign hypertension with CKD (chronic kidney disease) stage IV 06/02/2023    Peripheral edema 07/30/2023    Primary malignant neuroendocrine tumor of pancreas 08/24/2023    Encounter for long-term (current) use of high-risk medication 08/28/2023    Diabetic foot ulcer 12/24/2023    Stage 3a chronic kidney disease 01/28/2024    Pressure injury of buttock, stage 2 01/28/2024    HTN (hypertension) 04/21/2024    Anemia due to chronic kidney disease 05/17/2024    Bilateral lower extremity edema 08/24/2024    Cellulitis of right lower extremity 08/24/2024    CKD (chronic kidney disease) stage 4, GFR 15-29 ml/min 08/24/2024    Acute CHF 08/24/2024    Bilateral cellulitis of lower leg 08/25/2024     Resolved Ambulatory Problems     Diagnosis Date Noted    Leukocytosis     Diabetic eye exam 02/12/2014    Altered mental status 12/15/2017    Stage 3a chronic kidney disease 03/09/2012    Metabolic encephalopathy  12/02/2021    VIPUL (acute kidney injury) 12/02/2021    COVID-19 virus detected 02/23/2022    UTI (urinary tract infection) 02/23/2022    Acute UTI (urinary tract infection) 03/27/2022    Burning pain 05/27/2022    Acute metabolic encephalopathy 06/22/2022    Hypoglycemia 06/23/2022    Acute metabolic encephalopathy 06/24/2022    Altered mental status, unspecified altered mental status type 11/04/2022    Diarrhea 11/04/2022    Acute pain of left foot 05/13/2023    Bilateral leg pain 07/21/2023    Acute renal insufficiency 01/09/2024    COVID-19 01/10/2024    Cytokine release syndrome, grade 1 01/15/2024    C. difficile diarrhea 01/15/2024    Sepsis 01/27/2024    Metabolic encephalopathy 01/28/2024    Uncontrolled diabetes mellitus with hyperglycemia 04/20/2024    Pseudohyponatremia 04/21/2024     Past Medical History:   Diagnosis Date    Anxiety     Bleeding disorder     Depression     Diabetes     Diabetes mellitus, type 2     Disc degeneration, lumbar     Headache, tension-type     Hypothyroidism     Pancreatic mass     Peripheral neuropathy     Rotator cuff tear, left     Shoulder pain          PAST SURGICAL HISTORY  Past Surgical History:   Procedure Laterality Date    APPENDECTOMY      BILATERAL BREAST REDUCTION Bilateral 08/2015    CATARACT EXTRACTION  03/2015    CHOLECYSTECTOMY WITH INTRAOPERATIVE CHOLANGIOGRAM N/A 3/27/2022    Procedure: CHOLECYSTECTOMY LAPAROSCOPIC INTRAOPERATIVE CHOLANGIOGRAM;  Surgeon: Aiyana Hill MD;  Location: [facility] MAIN OR;  Service: General;  Laterality: N/A;    COLONOSCOPY  06/05/2015    WNL    ERCP N/A 2/25/2022    Procedure: ENDOSCOPIC RETROGRADE CHOLANGIOPANCREATOGRAPHY with sphincterotomy and balloon sweep;  Surgeon: Chilo Wilhelm MD;  Location: [facility] ENDOSCOPY;  Service: Gastroenterology;  Laterality: N/A;  PRE/POST - CBD stones    ERCP N/A 3/28/2022    Procedure: ENDOSCOPIC RETROGRADE CHOLANGIOPANCREATOGRAPHY WITH SPHINCTEROTOMY AND BALLOON SWEEP;  Surgeon: Melonie  Chilo BAKER MD;  Location: [facility] ENDOSCOPY;  Service: Gastroenterology;  Laterality: N/A;  PRE: COMMON DUCT STONE  POST: COMMON DUCT STONE    KYPHOPLASTY      REDUCTION MAMMAPLASTY      TONSILLECTOMY           FAMILY HISTORY  Family History   Problem Relation Age of Onset    Bone cancer Mother     No Known Problems Father     Heart disease Daughter          SOCIAL HISTORY  Social History     Socioeconomic History    Marital status:     Number of children: 3   Tobacco Use    Smoking status: Former     Current packs/day: 0.00     Average packs/day: 1 pack/day for 40.0 years (40.0 ttl pk-yrs)     Types: Cigarettes     Start date:      Quit date:      Years since quittin.7    Smokeless tobacco: Never   Vaping Use    Vaping status: Never Used   Substance and Sexual Activity    Alcohol use: No    Drug use: No    Sexual activity: Defer         ALLERGIES  Sulfa antibiotics    REVIEW OF SYSTEMS  Review of Systems  Included in HPI  All systems reviewed and negative except for those discussed in HPI.      PHYSICAL EXAM  ED Triage Vitals [10/10/24 1158]   Temp Heart Rate Resp BP SpO2   98 °F (36.7 °C) 69 18 167/63 96 %      Temp src Heart Rate Source Patient Position BP Location FiO2 (%)   Tympanic Monitor -- -- --       Physical Exam      GENERAL: Awake, alert, oriented x 3.  Nontoxic-appearing elderly female.  Resting comfortably in no acute distress  HENT: NCAT, nares patent, moist mucous membranes  EYES: no scleral icterus  CV: regular rhythm, normal rate  RESPIRATORY: normal effort, clear to auscultation bilaterally  ABDOMEN: soft, nontender  MUSCULOSKELETAL: Extremities are nontender with full range of motion.  There are Ace wrap's and dressings to both lower legs.  After dressings were removed, there is minimal faint pink discoloration over the anterior aspect of both lower legs.  There are a few small healing scabbed wounds on both lower legs.  No warmth, drainage, fluctuance, crepitus, or  lymphangitis.  There is 1+ pedal edema in both lower extremities  NEURO: Speech is normal.  No facial droop.  PSYCH:  calm, cooperative  SKIN: There is dry scaly skin on both lower legs and feet    Vital signs and nursing notes reviewed.          LAB RESULTS  Recent Results (from the past 24 hour(s))   POC Glucose Once    Collection Time: 10/10/24 12:08 PM    Specimen: Blood   Result Value Ref Range    Glucose 548 (C) 70 - 130 mg/dL   Comprehensive Metabolic Panel    Collection Time: 10/10/24 12:20 PM    Specimen: Arm, Right; Blood   Result Value Ref Range    Glucose 550 (C) 65 - 99 mg/dL    BUN 83 (H) 8 - 23 mg/dL    Creatinine 2.91 (H) 0.57 - 1.00 mg/dL    Sodium 130 (L) 136 - 145 mmol/L    Potassium 5.2 3.5 - 5.2 mmol/L    Chloride 99 98 - 107 mmol/L    CO2 18.0 (L) 22.0 - 29.0 mmol/L    Calcium 8.7 8.6 - 10.5 mg/dL    Total Protein 7.8 6.0 - 8.5 g/dL    Albumin 3.5 3.5 - 5.2 g/dL    ALT (SGPT) 7 1 - 33 U/L    AST (SGOT) 10 1 - 32 U/L    Alkaline Phosphatase 157 (H) 39 - 117 U/L    Total Bilirubin 0.3 0.0 - 1.2 mg/dL    Globulin 4.3 gm/dL    A/G Ratio 0.8 g/dL    BUN/Creatinine Ratio 28.5 (H) 7.0 - 25.0    Anion Gap 13.0 5.0 - 15.0 mmol/L    eGFR 15.4 (L) >60.0 mL/min/1.73   CBC Auto Differential    Collection Time: 10/10/24 12:20 PM    Specimen: Arm, Right; Blood   Result Value Ref Range    WBC 9.10 3.40 - 10.80 10*3/mm3    RBC 3.38 (L) 3.77 - 5.28 10*6/mm3    Hemoglobin 9.4 (L) 12.0 - 15.9 g/dL    Hematocrit 30.3 (L) 34.0 - 46.6 %    MCV 89.6 79.0 - 97.0 fL    MCH 27.8 26.6 - 33.0 pg    MCHC 31.0 (L) 31.5 - 35.7 g/dL    RDW 16.0 (H) 12.3 - 15.4 %    RDW-SD 52.1 37.0 - 54.0 fl    MPV 9.8 6.0 - 12.0 fL    Platelets 348 140 - 450 10*3/mm3    Neutrophil % 82.6 (H) 42.7 - 76.0 %    Lymphocyte % 7.7 (L) 19.6 - 45.3 %    Monocyte % 5.5 5.0 - 12.0 %    Eosinophil % 2.9 0.3 - 6.2 %    Basophil % 0.9 0.0 - 1.5 %    Immature Grans % 0.4 0.0 - 0.5 %    Neutrophils, Absolute 7.52 (H) 1.70 - 7.00 10*3/mm3    Lymphocytes,  Absolute 0.70 0.70 - 3.10 10*3/mm3    Monocytes, Absolute 0.50 0.10 - 0.90 10*3/mm3    Eosinophils, Absolute 0.26 0.00 - 0.40 10*3/mm3    Basophils, Absolute 0.08 0.00 - 0.20 10*3/mm3    Immature Grans, Absolute 0.04 0.00 - 0.05 10*3/mm3    nRBC 0.0 0.0 - 0.2 /100 WBC   Beta Hydroxybutyrate Quantitative    Collection Time: 10/10/24 12:20 PM    Specimen: Arm, Right; Blood   Result Value Ref Range    Beta-Hydroxybutyrate Quant 0.123 0.020 - 0.270 mmol/L   Urinalysis With Microscopic If Indicated (No Culture) - Urine, Clean Catch    Collection Time: 10/10/24  1:31 PM    Specimen: Urine, Clean Catch   Result Value Ref Range    Color, UA Yellow Yellow, Straw    Appearance, UA Clear Clear    pH, UA 6.0 5.0 - 8.0    Specific Gravity, UA 1.014 1.005 - 1.030    Glucose, UA >=1000 mg/dL (3+) (A) Negative    Ketones, UA Negative Negative    Bilirubin, UA Negative Negative    Blood, UA Trace (A) Negative    Protein,  mg/dL (2+) (A) Negative    Leuk Esterase, UA Small (1+) (A) Negative    Nitrite, UA Negative Negative    Urobilinogen, UA 0.2 E.U./dL 0.2 - 1.0 E.U./dL   Urinalysis, Microscopic Only - Urine, Clean Catch    Collection Time: 10/10/24  1:31 PM    Specimen: Urine, Clean Catch   Result Value Ref Range    RBC, UA 0-2 None Seen, 0-2 /HPF    WBC, UA 21-50 (A) None Seen, 0-2 /HPF    Bacteria, UA 4+ (A) None Seen /HPF    Squamous Epithelial Cells, UA 0-2 None Seen, 0-2 /HPF    Hyaline Casts, UA None Seen None Seen /LPF    Methodology Automated Microscopy    Blood Gas, Venous -    Collection Time: 10/10/24  2:09 PM    Specimen: Venous Blood   Result Value Ref Range    pH, Venous 7.356 7.310 - 7.410 pH Units    pCO2, Venous 28.6 (L) 41.0 - 51.0 mm Hg    pO2, Venous 33.2 (L) 35.0 - 45.0 mm Hg    HCO3, Venous 16.0 (L) 22.0 - 28.0 mmol/L    Base Excess, Venous -8.5 (L) -2.0 - 2.0 mmol/L    O2 Saturation, Venous 62.6 45.0 - 75.0 %    CO2 Content 16.9 (L) 23 - 27 mmol/L    Barometric Pressure for Blood Gas 754.0000 mmHg     Modality Room Air     Device Comment [id number]    POC Glucose Once    Collection Time: 10/10/24  2:28 PM    Specimen: Blood   Result Value Ref Range    Glucose 474 (C) 70 - 130 mg/dL       Ordered the above labs and reviewed the results.        RADIOLOGY  No Radiology Exams Resulted Within Past 24 Hours    Ordered the above noted radiological studies. Reviewed by me in PACS.            PROCEDURES  Procedures        OUTPATIENT MEDICATION MANAGEMENT:  Current Facility-Administered Medications Ordered in Epic   Medication Dose Route Frequency Provider Last Rate Last Admin    cefTRIAXone (ROCEPHIN) 1,000 mg in sodium chloride 0.9 % 100 mL MBP  1,000 mg Intravenous Once Everton Perez MD        sodium chloride 0.9 % flush 10 mL  10 mL Intravenous PRN Everton Perez MD         Current Outpatient Medications Ordered in Epic   Medication Sig Dispense Refill    acetaminophen (TYLENOL) 325 MG tablet Take 2 tablets by mouth Every 6 (Six) Hours As Needed for Mild Pain or Moderate Pain.      ammonium lactate (AMLACTIN) 12 % cream Apply 1 Application topically to the appropriate area as directed 2 (Two) Times a Day.      atorvastatin (LIPITOR) 80 MG tablet Take 1 tablet by mouth Every Night. Indications: High Amount of Fats in the Blood 30 tablet 2    bisoprolol (ZEBeta) 5 MG tablet Take 1 tablet by mouth Daily. 90 tablet 3    bumetanide (BUMEX) 2 MG tablet Take 1 tablet by mouth 2 (Two) Times a Day. 180 tablet 1    chlorthalidone (HYGROTON) 25 MG tablet       DULoxetine (CYMBALTA) 30 MG capsule Take 1 capsule by mouth Daily. 90 capsule 3    ferrous sulfate 325 (65 FE) MG tablet Take 1 tablet by mouth Every Other Day.      fluticasone (FLONASE) 50 MCG/ACT nasal spray Administer 2 sprays into the nostril(s) as directed by provider Daily. 16 g 11    hydrALAZINE (APRESOLINE) 50 MG tablet Take 1 tablet by mouth Every 8 (Eight) Hours. 270 tablet 1    hydrocortisone-bacitracin-zinc oxide-nystatin (MAGIC BARRIER) Apply 1  Application topically to the appropriate area as directed 2 (Two) Times a Day.      Insulin Glargine, 2 Unit Dial, (TOUJEO) 300 UNIT/ML solution pen-injector injection Inject 90 Units under the skin into the appropriate area as directed Daily. 30 mL 0    Insulin Lispro, 1 Unit Dial, (HumaLOG KwikPen) 100 UNIT/ML solution pen-injector Inject 15-20 Units under the skin into the appropriate area as directed 3 (Three) Times a Day Before Meals. 60 mL 0    Insulin Pen Needle (BD Pen Needle Naila 2nd Gen) 32G X 4 MM misc Use 1 pen needle 4 (Four) Times a Day. 400 each 2    lansoprazole (PREVACID) 30 MG capsule TAKE 1 CAPSULE DAILY 30 capsule 11    levothyroxine (SYNTHROID, LEVOTHROID) 137 MCG tablet Take 1 tablet by mouth Every Morning. 90 tablet 1    lisinopril (PRINIVIL,ZESTRIL) 40 MG tablet       octreotide (sandoSTATIN) 30 MG injection Inject 30 mg into the appropriate muscle as directed by prescriber Every 28 (Twenty-Eight) Days. Next dose 9/16/2024      pregabalin (LYRICA) 75 MG capsule Take 1 capsule by mouth 2 (Two) Times a Day for 3 days. 6 capsule 0    spironolactone (ALDACTONE) 50 MG tablet Take 1 tablet by mouth Daily. 90 tablet 1    terazosin (HYTRIN) 1 MG capsule Take 1 capsule by mouth Every Night. 90 capsule 1           MEDICATIONS GIVEN IN ER  Medications   sodium chloride 0.9 % flush 10 mL (has no administration in time range)   cefTRIAXone (ROCEPHIN) 1,000 mg in sodium chloride 0.9 % 100 mL MBP (has no administration in time range)   sodium chloride 0.9 % bolus 1,000 mL (0 mL Intravenous Stopped 10/10/24 1425)   insulin regular (humuLIN R,novoLIN R) injection 10 Units (10 Units Subcutaneous Given 10/10/24 1309)                   MEDICAL DECISION MAKING, PROGRESS, and CONSULTS    All labs have been independently reviewed by me.  All radiology studies have been reviewed by me and I have also reviewed the radiology report.   EKG's independently viewed and interpreted by me.  Discussion below represents my  analysis of pertinent findings related to patient's condition, differential diagnosis, treatment plan and final disposition.      Additional sources:    - Discussed/ obtained information from independent historians: EMS    - External (non-ED) record review: Patient was last admitted here in August 2024 for bilateral leg cellulitis and leg wounds, acute CHF, chronic kidney disease, and anemia.    -Prescription drug monitoring program review:     N/A    - Chronic or social conditions impacting patient care (Social Determinants of Health): None          Orders placed during this visit:  Orders Placed This Encounter   Procedures    Urine Culture - Urine, Urine, Clean Catch    Comprehensive Metabolic Panel    Blood Gas, Venous -    Urinalysis With Microscopic If Indicated (No Culture) - Urine, Clean Catch    CBC Auto Differential    Beta Hydroxybutyrate Quantitative    Urinalysis, Microscopic Only - Urine, Clean Catch    [facility] (on-call MD unless specified) Details    POC Glucose Once    POC Glucose Once    POC Glucose Once    POC Glucose Once    Insert Peripheral IV    Initiate Observation Status    CBC & Differential    Ketone Bodies, Serum (Not performed at [place])         Additional orders considered but not ordered:          Differential diagnosis includes, but is not limited to:    DKA, hyperosmolar state, noncompliance, dehydration, electrolyte abnormality, UTI, cellulitis      Independent interpretation of labs, radiology studies, and discussions with consultants:  ED Course as of 10/10/24 1453   Thu Oct 10, 2024   1210 BP: 167/63 [WH]   1210 Temp: 98 °F (36.7 °C) [WH]   1210 Heart Rate: 69 [WH]   1210 Resp: 18 [WH]   1210 SpO2: 96 % [WH]   1210 Glucose(!!): 548 [WH]   1231 Patient complains of elevated blood glucose for the past 5 to 6 days.  Accu-Chek is 548.  She is a type II diabetic.  Will obtain labs for further evaluation.  She will be given IV fluids and a dose of IV insulin.  Differential diagnosis  includes but is not limited to: DKA, hyperosmolar state, electrolyte abnormality, dehydration, noncompliance, cellulitis [WH]   1319 Glucose(!!): 550 [WH]   1319 BUN(!): 83 [WH]   1319 Creatinine(!): 2.91  Renal function is unchanged from 2 days ago [WH]   1319 Beta-Hydroxybutyrate Quant: 0.123 [WH]   1320 CO2(!): 18.0 [WH]   1320 eGFR(!): 15.4 [WH]   1320 Anion Gap: 13.0 [WH]   1422 pH, Venous: 7.356  Patient is not acidotic [WH]   1422 Leukocytes, UA(!): Small (1+) [WH]   1422 Nitrite, UA: Negative [WH]   1422 WBC, UA(!): 21-50 [WH]   1422 Bacteria, UA(!): 4+ [WH]   1433 Patient is resting comfortably.  Glucose is 474.  Test results and diagnoses were discussed with her.  Shared decision making was discussed and admission was recommended.  She is agreeable with this.  Urine culture and IV Rocephin have been ordered.  Call will be placed to the hospitalist for admission. [WH]   1450 Case discussed with Dr. Barnard and she agrees to admit the patient to a monitored bed.  Pertinent history, exam findings, test results, ED course, and diagnoses were discussed with her. [WH]   1453 MDM: Patient presented the ED complaining of elevated glucose.  She is a type II diabetic and reports being compliant with her insulin regimen.  Initial glucose was 550.  This improved to 474 with IV fluids and IV insulin.  Patient was not in DKA.  She has a UTI.  Urine culture and IV antibiotics have been ordered.  She has chronic renal sufficiency which is stable.  She also complained of chronic pain, swelling, and redness in her legs.  On exam, there was very faint redness and dry scaly skin.  Findings are most consistent with venous stasis dermatitis.  Case was discussed with the hospitalist and she will be admitted. [WH]      ED Course User Index  [WH] Everton Perez MD         COMPLEXITY OF CARE  The patient requires admission.      DIAGNOSIS  Final diagnoses:   Acute UTI   Type 2 diabetes mellitus with hyperglycemia, with  long-term current use of insulin   Poorly controlled diabetes mellitus   Venous stasis dermatitis of both lower extremities   Chronic renal impairment, unspecified CKD stage         DISPOSITION  ADMISSION    Discussed treatment plan and reason for admission with pt/family and admitting physician.  Pt/family voiced understanding of the plan for admission for further testing/treatment as needed.               Latest Documented Vital Signs:  AS OF 14:53 EDT VITALS:    BP - 174/83  HR - 70  TEMP - 98 °F (36.7 °C) (Tympanic)  O2 SATS - 95%            --    Please note that portions of this were completed with a voice recognition program.       Note Disclaimer: At [facility], we believe that sharing information builds trust and better relationships. You are receiving this note because you are receiving care at [facility] or recently visited. It is possible you will see health information before a provider has talked with you about it. This kind of information can be easy to misunderstand. To help you fully understand what it means for your health, we urge you to discuss this note with your provider.             Everton Perez MD  10/10/24 2971

## 2024-10-10 NOTE — TELEPHONE ENCOUNTER
Caller: SADIA    Relationship: [facility] PHYSICAL THERAPIST      Best call back number: [phone]      What was the call regarding:  SADIA  PHYSICAL THERAPIST CALLING FROM [facility] TO ASK A QUESTION ABOUT PATIENS MEDICATION  STATES PATIENT IS HAVING A LOT OF CRAMPING ARMS AND LEGS      SADIA IS WONDERING SINCE PATIENT IS ON A DIURETIC     UMANZOR SHE NEED A POTASSIUM  PILL     PLEASE GIVE CALL BACK

## 2024-10-10 NOTE — ED NOTES
"Pt is diabetic and ems glucometer reads \"hi\" - the home health nurse found glucose hi today as well and called ems  "

## 2024-10-10 NOTE — H&P
HISTORY AND PHYSICAL   [facility]        Date of Admission: 10/10/2024  Patient Identification:  Name: Halie Guidry  Age: 84 y.o.  Sex: female  :  1940  MRN: [id number]                     Primary Care Physician: Napoleon Mead MD    Chief Complaint:  84 year old female presented to the emergency room with high blood sugar readings; she had not been using her prescribed doses of insulin and has not taken any today; she is at a facility and eats what she is served; she has had excessive thirst and urination and some burning; no fever or chills; she has had cramps of her legs and hands; no nausea or vomiting     History of Present Illness:   As above    Past Medical History:  Past Medical History:   Diagnosis Date    Acute metabolic encephalopathy 2022    Anxiety     Arthritis     Benign essential hypertension 2014    Bleeding disorder     Depression     Diabetes     Diabetes mellitus, type 2     Disc degeneration, lumbar     Headache, tension-type     Hyperlipidemia     Hypothyroidism     Neuropathy     Osteoporosis 2015    Pancreatic mass     2023, on Monthly Octreotide Injection    Peripheral neuropathy     Rotator cuff tear, left     Scoliosis     Shoulder pain     LEFT, TORN ROTATOR CUFF S/P FALL    Spinal stenosis      Past Surgical History:  Past Surgical History:   Procedure Laterality Date    APPENDECTOMY      BILATERAL BREAST REDUCTION Bilateral 2015    CATARACT EXTRACTION  2015    CHOLECYSTECTOMY WITH INTRAOPERATIVE CHOLANGIOGRAM N/A 3/27/2022    Procedure: CHOLECYSTECTOMY LAPAROSCOPIC INTRAOPERATIVE CHOLANGIOGRAM;  Surgeon: Aiyana Hill MD;  Location: [facility] MAIN OR;  Service: General;  Laterality: N/A;    COLONOSCOPY  2015    WNL    ERCP N/A 2022    Procedure: ENDOSCOPIC RETROGRADE CHOLANGIOPANCREATOGRAPHY with sphincterotomy and balloon sweep;  Surgeon: Chilo Wilhelm MD;  Location: [facility] ENDOSCOPY;  Service:  Gastroenterology;  Laterality: N/A;  PRE/POST - CBD stones    ERCP N/A 3/28/2022    Procedure: ENDOSCOPIC RETROGRADE CHOLANGIOPANCREATOGRAPHY WITH SPHINCTEROTOMY AND BALLOON SWEEP;  Surgeon: Chilo Wilhelm MD;  Location: [facility] ENDOSCOPY;  Service: Gastroenterology;  Laterality: N/A;  PRE: COMMON DUCT STONE  POST: COMMON DUCT STONE    KYPHOPLASTY      REDUCTION MAMMAPLASTY      TONSILLECTOMY        Home Meds:  Medications Prior to Admission   Medication Sig Dispense Refill Last Dose    acetaminophen (TYLENOL) 325 MG tablet Take 2 tablets by mouth Every 6 (Six) Hours As Needed for Mild Pain or Moderate Pain.   Past Week    ammonium lactate (AMLACTIN) 12 % cream Apply 1 Application topically to the appropriate area as directed 2 (Two) Times a Day.   Past Week    atorvastatin (LIPITOR) 80 MG tablet Take 1 tablet by mouth Every Night. Indications: High Amount of Fats in the Blood 30 tablet 2 10/9/2024    bisoprolol (ZEBeta) 5 MG tablet Take 1 tablet by mouth Daily. 90 tablet 3 10/9/2024    bumetanide (BUMEX) 2 MG tablet Take 1 tablet by mouth 2 (Two) Times a Day. 180 tablet 1 10/9/2024    DULoxetine (CYMBALTA) 30 MG capsule Take 1 capsule by mouth Daily. 90 capsule 3 10/9/2024    ferrous sulfate 325 (65 FE) MG tablet Take 1 tablet by mouth Every Other Day.   10/9/2024    fluticasone (FLONASE) 50 MCG/ACT nasal spray Administer 2 sprays into the nostril(s) as directed by provider Daily. 16 g 11 Past Month    hydrALAZINE (APRESOLINE) 50 MG tablet Take 1 tablet by mouth Every 8 (Eight) Hours. 270 tablet 1 10/9/2024    hydrocortisone-bacitracin-zinc oxide-nystatin (MAGIC BARRIER) Apply 1 Application topically to the appropriate area as directed 2 (Two) Times a Day.   Past Week    Insulin Glargine, 2 Unit Dial, (TOUJEO) 300 UNIT/ML solution pen-injector injection Inject 90 Units under the skin into the appropriate area as directed Daily. 30 mL 0 10/9/2024    Insulin Lispro, 1 Unit Dial, (HumaLOG KwikPen) 100 UNIT/ML  solution pen-injector Inject 15-20 Units under the skin into the appropriate area as directed 3 (Three) Times a Day Before Meals. 60 mL 0 10/9/2024    Insulin Pen Needle (BD Pen Needle Naila 2nd Gen) 32G X 4 MM misc Use 1 pen needle 4 (Four) Times a Day. 400 each 2 10/9/2024    lansoprazole (PREVACID) 30 MG capsule TAKE 1 CAPSULE DAILY 30 capsule 11 10/9/2024    levothyroxine (SYNTHROID, LEVOTHROID) 137 MCG tablet Take 1 tablet by mouth Every Morning. 90 tablet 1 10/9/2024    octreotide (sandoSTATIN) 30 MG injection Inject 30 mg into the appropriate muscle as directed by prescriber Every 28 (Twenty-Eight) Days. Next dose 9/16/2024   Past Month    pregabalin (LYRICA) 75 MG capsule Take 1 capsule by mouth 2 (Two) Times a Day for 3 days. 6 capsule 0 10/9/2024    spironolactone (ALDACTONE) 50 MG tablet Take 1 tablet by mouth Daily. 90 tablet 1 10/9/2024    terazosin (HYTRIN) 1 MG capsule Take 1 capsule by mouth Every Night. 90 capsule 1 10/9/2024    chlorthalidone (HYGROTON) 25 MG tablet    Unknown    lisinopril (PRINIVIL,ZESTRIL) 40 MG tablet    Unknown       Allergies:  Allergies   Allergen Reactions    Sulfa Antibiotics Unknown - High Severity     Pt says it was a long time ago and I don't remember but it wasn't good.      Immunizations:  Immunization History   Administered Date(s) Administered    COVID-19 (PFIZER) Purple Cap Monovalent 02/27/2021, 03/20/2021    FLUAD TRI 65YR+ 10/16/2019    FluMist 2-49yrs 10/03/2012    Fluad Quad 65+ 11/30/2020    Fluzone (or Fluarix & Flulaval for VFC) >6mos 12/07/2021    Fluzone High-Dose 65+YRS 10/09/2017, 10/09/2017    Pneumococcal Conjugate 13-Valent (PCV13) 04/29/2017, 04/29/2017    Pneumococcal, Unspecified 10/01/2009    Shingrix 04/15/2019    Td (TDVAX) 06/30/2016    Tdap 04/29/2017, 04/29/2017    Zostavax 12/14/2010     Social History:   Social History     Social History Narrative    Not on file     Social History     Socioeconomic History    Marital status:      Number of children: 3   Tobacco Use    Smoking status: Former     Current packs/day: 0.00     Average packs/day: 1 pack/day for 40.0 years (40.0 ttl pk-yrs)     Types: Cigarettes     Start date:      Quit date:      Years since quittin.7    Smokeless tobacco: Never   Vaping Use    Vaping status: Never Used   Substance and Sexual Activity    Alcohol use: No    Drug use: No    Sexual activity: Defer       Family History:  Family History   Problem Relation Age of Onset    Bone cancer Mother     No Known Problems Father     Heart disease Daughter         Review of Systems  See history of present illness and past medical history.  Patient denies headache, dizziness, syncope, falls, trauma, change in vision, change in hearing, change in taste, changes in weight, changes in appetite, focal weakness, numbness, or paresthesia.  Patient denies chest pain, palpitations, dyspnea, orthopnea, PND, cough, sinus pressure, rhinorrhea, epistaxis, hemoptysis, nausea, vomiting,hematemesis, diarrhea, constipation or hematochezia.  Denies cold or heat intolerance, polydipsia, polyuria, polyphagia. Denies hematuria, pyuria,   hesitancy,  Denies consumption of raw and under cooked meats foods or change in water source.  Denies fever, chills, sweats, night sweats.       Objective:  T Max 24 hrs: Temp (24hrs), Av.9 °F (36.6 °C), Min:97.7 °F (36.5 °C), Max:98.1 °F (36.7 °C)    Vitals Ranges:   Temp:  [97.7 °F (36.5 °C)-98.1 °F (36.7 °C)] 97.7 °F (36.5 °C)  Heart Rate:  [65-76] 76  Resp:  [16-18] 16  BP: (145-209)/(52-87) 168/52      Exam:  /52 (BP Location: Right arm, Patient Position: Lying)   Pulse 76   Temp 97.7 °F (36.5 °C) (Oral)   Resp 16   SpO2 98%     General Appearance:    Alert, cooperative, no distress, appears stated age   Head:    Normocephalic, without obvious abnormality, atraumatic   Eyes:    PERRL, conjunctivae/corneas clear, EOM's intact, both eyes   Ears:    Normal external ear canals, both ears    Nose:   Nares normal, septum midline, mucosa normal, no drainage    or sinus tenderness   Throat:   Lips, mucosa, and tongue normal   Neck:   Supple, symmetrical, trachea midline, no adenopathy;     thyroid:  no enlargement/tenderness/nodules; no carotid    bruit or JVD   Back:     Symmetric, no curvature, ROM normal, no CVA tenderness   Lungs:     Decreased breath sounds bilaterally, respirations unlabored   Chest Wall:    No tenderness or deformity    Heart:    Regular rate and rhythm, S1 and S2 normal, no murmur, rub   or gallop   Abdomen:     Soft, nontender, bowel sounds active all four quadrants,     no masses, no hepatomegaly, no splenomegaly   Extremities:   Extremities normal, atraumatic, no cyanosis or edema      .    Data Review:  Labs in chart were reviewed.  WBC   Date Value Ref Range Status   10/10/2024 9.10 3.40 - 10.80 10*3/mm3 Final   10/08/2024 10.55 3.40 - 10.80 10*3/mm3 Final     Hemoglobin   Date Value Ref Range Status   10/10/2024 9.4 (L) 12.0 - 15.9 g/dL Final     Hematocrit   Date Value Ref Range Status   10/10/2024 30.3 (L) 34.0 - 46.6 % Final     Platelets   Date Value Ref Range Status   10/10/2024 348 140 - 450 10*3/mm3 Final     Sodium   Date Value Ref Range Status   10/10/2024 130 (L) 136 - 145 mmol/L Final     Potassium   Date Value Ref Range Status   10/10/2024 5.2 3.5 - 5.2 mmol/L Final     Chloride   Date Value Ref Range Status   10/10/2024 99 98 - 107 mmol/L Final     CO2   Date Value Ref Range Status   10/10/2024 18.0 (L) 22.0 - 29.0 mmol/L Final     BUN   Date Value Ref Range Status   10/10/2024 83 (H) 8 - 23 mg/dL Final     Creatinine   Date Value Ref Range Status   10/10/2024 2.91 (H) 0.57 - 1.00 mg/dL Final     Glucose   Date Value Ref Range Status   10/10/2024 550 (C) 65 - 99 mg/dL Final     Calcium   Date Value Ref Range Status   10/10/2024 8.7 8.6 - 10.5 mg/dL Final     AST (SGOT)   Date Value Ref Range Status   10/10/2024 10 1 - 32 U/L Final     ALT (SGPT)   Date Value  Ref Range Status   10/10/2024 7 1 - 33 U/L Final     Alkaline Phosphatase   Date Value Ref Range Status   10/10/2024 157 (H) 39 - 117 U/L Final                Imaging Results (All)       None              Assessment:  Active Hospital Problems    Diagnosis  POA    **Acute UTI [N39.0]  Yes    UTI (urinary tract infection) [N39.0]  Yes      Resolved Hospital Problems   No resolved problems to display.   Diabetes with hyperglycemia  Hypertension  Hypothyroidism  Hyperlipidemia  Anemia  Hyponatremia  Ckd4  Stasis dermatitis    Plan    Will continue antibiotics  Ask the wound nurse to see her regarding the venous stasis  Monitor on telemetry  Ask the diabetes educator to see her  Resume her long acting insulin and adjust as needed  She had been taking only partial doses   Dw patient and ed provider  Kim Barnard MD  10/10/2024  19:37 EDT

## 2024-10-11 PROBLEM — D49.0 PANCREATIC NEOPLASM: Status: ACTIVE | Noted: 2023-08-17

## 2024-10-11 PROBLEM — I50.9 CONGESTIVE HEART FAILURE: Status: ACTIVE | Noted: 2022-11-30

## 2024-10-11 PROBLEM — K21.9 GASTROESOPHAGEAL REFLUX DISEASE: Status: ACTIVE | Noted: 2022-11-30

## 2024-10-11 PROBLEM — N17.0 ACUTE KIDNEY INJURY (AKI) WITH ACUTE TUBULAR NECROSIS (ATN): Status: ACTIVE | Noted: 2021-12-02

## 2024-10-11 PROBLEM — G31.84 MILD NEUROCOGNITIVE DISORDER: Status: ACTIVE | Noted: 2022-11-30

## 2024-10-11 PROBLEM — N39.0 ACUTE UTI (URINARY TRACT INFECTION): Status: ACTIVE | Noted: 2024-10-11

## 2024-10-11 PROBLEM — E53.8 COBALAMIN DEFICIENCY: Status: ACTIVE | Noted: 2022-11-30

## 2024-10-11 PROBLEM — R60.9 DEPENDENT EDEMA: Status: ACTIVE | Noted: 2022-11-30

## 2024-10-11 PROBLEM — E87.20 METABOLIC ACIDOSIS: Status: ACTIVE | Noted: 2024-10-11

## 2024-10-11 LAB
ANION GAP SERPL CALCULATED.3IONS-SCNC: 12 MMOL/L (ref 5–15)
ANION GAP SERPL CALCULATED.3IONS-SCNC: 16.1 MMOL/L (ref 5–15)
BUN SERPL-MCNC: 74 MG/DL (ref 8–23)
BUN SERPL-MCNC: 76 MG/DL (ref 8–23)
BUN/CREAT SERPL: 30 (ref 7–25)
BUN/CREAT SERPL: 32.9 (ref 7–25)
CALCIUM SPEC-SCNC: 8.6 MG/DL (ref 8.6–10.5)
CALCIUM SPEC-SCNC: 8.8 MG/DL (ref 8.6–10.5)
CHLORIDE SERPL-SCNC: 100 MMOL/L (ref 98–107)
CHLORIDE SERPL-SCNC: 106 MMOL/L (ref 98–107)
CO2 SERPL-SCNC: 16.9 MMOL/L (ref 22–29)
CO2 SERPL-SCNC: 17 MMOL/L (ref 22–29)
CREAT SERPL-MCNC: 2.31 MG/DL (ref 0.57–1)
CREAT SERPL-MCNC: 2.47 MG/DL (ref 0.57–1)
DEPRECATED RDW RBC AUTO: 50.8 FL (ref 37–54)
EGFRCR SERPLBLD CKD-EPI 2021: 18.8 ML/MIN/1.73
EGFRCR SERPLBLD CKD-EPI 2021: 20.4 ML/MIN/1.73
ERYTHROCYTE [DISTWIDTH] IN BLOOD BY AUTOMATED COUNT: 16.2 % (ref 12.3–15.4)
GLUCOSE BLDC GLUCOMTR-MCNC: 235 MG/DL (ref 70–130)
GLUCOSE BLDC GLUCOMTR-MCNC: 267 MG/DL (ref 70–130)
GLUCOSE BLDC GLUCOMTR-MCNC: 296 MG/DL (ref 70–130)
GLUCOSE BLDC GLUCOMTR-MCNC: 300 MG/DL (ref 70–130)
GLUCOSE BLDC GLUCOMTR-MCNC: 304 MG/DL (ref 70–130)
GLUCOSE SERPL-MCNC: 269 MG/DL (ref 65–99)
GLUCOSE SERPL-MCNC: 291 MG/DL (ref 65–99)
HCT VFR BLD AUTO: 27.8 % (ref 34–46.6)
HGB BLD-MCNC: 8.6 G/DL (ref 12–15.9)
MCH RBC QN AUTO: 26.9 PG (ref 26.6–33)
MCHC RBC AUTO-ENTMCNC: 30.9 G/DL (ref 31.5–35.7)
MCV RBC AUTO: 86.9 FL (ref 79–97)
PLATELET # BLD AUTO: 350 10*3/MM3 (ref 140–450)
PMV BLD AUTO: 9.9 FL (ref 6–12)
POTASSIUM SERPL-SCNC: 4.6 MMOL/L (ref 3.5–5.2)
POTASSIUM SERPL-SCNC: 4.8 MMOL/L (ref 3.5–5.2)
RBC # BLD AUTO: 3.2 10*6/MM3 (ref 3.77–5.28)
SODIUM SERPL-SCNC: 133 MMOL/L (ref 136–145)
SODIUM SERPL-SCNC: 135 MMOL/L (ref 136–145)
WBC NRBC COR # BLD AUTO: 11.27 10*3/MM3 (ref 3.4–10.8)

## 2024-10-11 PROCEDURE — 63710000001 INSULIN LISPRO (HUMAN) PER 5 UNITS: Performed by: INTERNAL MEDICINE

## 2024-10-11 PROCEDURE — 63710000001 INSULIN GLARGINE PER 5 UNITS: Performed by: INTERNAL MEDICINE

## 2024-10-11 PROCEDURE — 80048 BASIC METABOLIC PNL TOTAL CA: CPT | Performed by: NURSE PRACTITIONER

## 2024-10-11 PROCEDURE — 25010000002 MORPHINE PER 10 MG: Performed by: NURSE PRACTITIONER

## 2024-10-11 PROCEDURE — 25010000002 CEFTRIAXONE PER 250 MG: Performed by: NURSE PRACTITIONER

## 2024-10-11 PROCEDURE — 36415 COLL VENOUS BLD VENIPUNCTURE: CPT | Performed by: INTERNAL MEDICINE

## 2024-10-11 PROCEDURE — 82948 REAGENT STRIP/BLOOD GLUCOSE: CPT

## 2024-10-11 PROCEDURE — 85027 COMPLETE CBC AUTOMATED: CPT | Performed by: INTERNAL MEDICINE

## 2024-10-11 PROCEDURE — 80048 BASIC METABOLIC PNL TOTAL CA: CPT | Performed by: INTERNAL MEDICINE

## 2024-10-11 PROCEDURE — 63710000001 INSULIN LISPRO (HUMAN) PER 5 UNITS: Performed by: NURSE PRACTITIONER

## 2024-10-11 RX ORDER — MORPHINE SULFATE 2 MG/ML
2 INJECTION, SOLUTION INTRAMUSCULAR; INTRAVENOUS ONCE
Status: COMPLETED | OUTPATIENT
Start: 2024-10-11 | End: 2024-10-11

## 2024-10-11 RX ORDER — HYDROCODONE BITARTRATE AND ACETAMINOPHEN 7.5; 325 MG/1; MG/1
1 TABLET ORAL EVERY 6 HOURS PRN
Status: DISCONTINUED | OUTPATIENT
Start: 2024-10-11 | End: 2024-10-12

## 2024-10-11 RX ORDER — GABAPENTIN 100 MG/1
100 CAPSULE ORAL EVERY 8 HOURS SCHEDULED
Status: DISCONTINUED | OUTPATIENT
Start: 2024-10-11 | End: 2024-10-12

## 2024-10-11 RX ADMIN — TERAZOSIN 1 MG: 1 CAPSULE ORAL at 21:25

## 2024-10-11 RX ADMIN — HYDRALAZINE HYDROCHLORIDE 50 MG: 50 TABLET ORAL at 14:45

## 2024-10-11 RX ADMIN — PREGABALIN 75 MG: 75 CAPSULE ORAL at 21:25

## 2024-10-11 RX ADMIN — HYDRALAZINE HYDROCHLORIDE 50 MG: 50 TABLET ORAL at 06:01

## 2024-10-11 RX ADMIN — ANORECTAL OINTMENT 1 APPLICATION: 15.7; .44; 24; 20.6 OINTMENT TOPICAL at 16:27

## 2024-10-11 RX ADMIN — HYDRALAZINE HYDROCHLORIDE 50 MG: 50 TABLET ORAL at 21:25

## 2024-10-11 RX ADMIN — HYDROCODONE BITARTRATE AND ACETAMINOPHEN 1 TABLET: 7.5; 325 TABLET ORAL at 16:27

## 2024-10-11 RX ADMIN — MORPHINE SULFATE 2 MG: 2 INJECTION, SOLUTION INTRAMUSCULAR; INTRAVENOUS at 22:53

## 2024-10-11 RX ADMIN — BUMETANIDE 2 MG: 2 TABLET ORAL at 08:39

## 2024-10-11 RX ADMIN — ACETAMINOPHEN 325MG 650 MG: 325 TABLET ORAL at 04:33

## 2024-10-11 RX ADMIN — INSULIN LISPRO 6 UNITS: 100 INJECTION, SOLUTION INTRAVENOUS; SUBCUTANEOUS at 21:25

## 2024-10-11 RX ADMIN — LEVOTHYROXINE SODIUM 137 MCG: 137 TABLET ORAL at 06:01

## 2024-10-11 RX ADMIN — ACETAMINOPHEN 325MG 650 MG: 325 TABLET ORAL at 12:53

## 2024-10-11 RX ADMIN — GABAPENTIN 100 MG: 100 CAPSULE ORAL at 21:25

## 2024-10-11 RX ADMIN — INSULIN LISPRO 6 UNITS: 100 INJECTION, SOLUTION INTRAVENOUS; SUBCUTANEOUS at 11:54

## 2024-10-11 RX ADMIN — INSULIN LISPRO 4 UNITS: 100 INJECTION, SOLUTION INTRAVENOUS; SUBCUTANEOUS at 17:30

## 2024-10-11 RX ADMIN — PREGABALIN 75 MG: 75 CAPSULE ORAL at 08:39

## 2024-10-11 RX ADMIN — PANTOPRAZOLE SODIUM 40 MG: 40 TABLET, DELAYED RELEASE ORAL at 06:01

## 2024-10-11 RX ADMIN — DULOXETINE HYDROCHLORIDE 30 MG: 30 CAPSULE, DELAYED RELEASE ORAL at 08:39

## 2024-10-11 RX ADMIN — INSULIN LISPRO 7 UNITS: 100 INJECTION, SOLUTION INTRAVENOUS; SUBCUTANEOUS at 08:39

## 2024-10-11 RX ADMIN — LIDOCAINE 1 APPLICATION: 4 CREAM TOPICAL at 08:34

## 2024-10-11 RX ADMIN — SPIRONOLACTONE 50 MG: 50 TABLET, FILM COATED ORAL at 08:39

## 2024-10-11 RX ADMIN — ANORECTAL OINTMENT 1 APPLICATION: 15.7; .44; 24; 20.6 OINTMENT TOPICAL at 21:25

## 2024-10-11 RX ADMIN — BISOPROLOL FUMARATE 5 MG: 5 TABLET, FILM COATED ORAL at 08:49

## 2024-10-11 RX ADMIN — CEFTRIAXONE SODIUM 1000 MG: 1 INJECTION, POWDER, FOR SOLUTION INTRAMUSCULAR; INTRAVENOUS at 11:54

## 2024-10-11 RX ADMIN — ATORVASTATIN CALCIUM 80 MG: 80 TABLET, FILM COATED ORAL at 21:25

## 2024-10-11 RX ADMIN — INSULIN GLARGINE 40 UNITS: 100 INJECTION, SOLUTION SUBCUTANEOUS at 08:40

## 2024-10-11 RX ADMIN — FERROUS SULFATE TAB 325 MG (65 MG ELEMENTAL FE) 325 MG: 325 (65 FE) TAB at 08:39

## 2024-10-11 RX ADMIN — GABAPENTIN 100 MG: 100 CAPSULE ORAL at 14:45

## 2024-10-11 NOTE — DISCHARGE PLACEMENT REQUEST
"Tabahta Guidry (84 y.o. Female)       Date of Birth   1940    Social Security Number       Address   [address]  [place] [id number]    Home Phone   [phone]    MRN   [id number]       Faith   Religion    Marital Status                               Admission Date   10/10/24    Admission Type   Emergency    Admitting Provider   Kim Barnard MD    Attending Provider   Nolan Dasilva MD    Department, Room/Bed   [address], [address]       Discharge Date       Discharge Disposition       Discharge Destination                                 Attending Provider: Nolan Dasilva MD    Allergies: Sulfa Antibiotics    Isolation: None   Infection: None   Code Status: CPR    Ht: 172.7 cm (67.99\")   Wt: 81.6 kg (180 lb)    Admission Cmt: None   Principal Problem: Acute UTI [N39.0]                   Active Insurance as of 10/10/2024       Primary Coverage       Payor Plan Insurance Group Employer/Plan Group    [facility] MEDICARE REPLACEMENT [facility] MED ADV GROUP [id number]       Payor Plan Address Payor Plan Phone Number Payor Plan Fax Number Effective Dates    [address] [address]   1/1/2023 - None Entered    [facility] [id number]         Subscriber Name Subscriber Birth Date Member ID       TABATHA GUIDRY 1940 [id number]                     Emergency Contacts        (Rel.) Home Phone Work Phone Mobile Phone    Derrick Guidry ([employer]) (Son) [phone] -- [phone]    Josse Guidry (HCS) (Son) -- -- [phone]    JAYME GUIDRY (Grandchild) -- -- [phone]                "

## 2024-10-11 NOTE — NURSING NOTE
CWCN: Consult received for patient with skin problems on Coccyx and bilateral lower leg. Patient is alert, able to repositioning with assistance, upon assessment we could see red, blanched and scratchy skin on Gluteal area in soft tissue, related to moisture and/or friction, Calmoseptine was ordered.  In addition swollen appearance, red, shiny skin with some scab noted on bilateral lower leg, d/t Venous stasis dermatitis as per admission diagnosis, Ammonium lactate is already ordered.  Bilateral Heesl are with intact skin and normal coloration, protective padding should be used to facilitate cushioning, they must remain off-loaded at all the time.  Wound care order and pressure ulcer preventives  measures have been implemented into Epic.   Please add Accumax pump pressure relief.  Please re-consult for any additional needs.

## 2024-10-11 NOTE — CONSULTS
"Nutrition Services    Patient Name: Halie Guidry  YOB: 1940  MRN: [id number]  Admission date: 10/10/2024      NUTRITION SCREENING      Trending Narrative: 10/11: Pt being assessed for malnutrition screening tool score of 2 (unsure of wt changes). Pt admitted to [facility] with an Acute UTI. Pt says she has been eating well at assisted living, 3 meals daily. Pt denied wt changes. PT came into room for eval. Pt appeared well nourished.        PO Diet: Diet: Cardiac, Diabetic; Healthy Heart (2-3 Na+); Consistent Carbohydrate; Fluid Consistency: Thin (IDDSI 0)   PO Supplements: -   Trending PO Intake:  10/11: no intake recorded       Nutritionally-Pertinent Medications RDN Reviewed, C/W clinical course         Labs (reviewed below):      Results from last 7 days   Lab Units 10/11/24  0455 10/10/24  1220 10/08/24  1401   SODIUM mmol/L 135* 130* 129*   POTASSIUM mmol/L 4.8 5.2 5.4*   CHLORIDE mmol/L 106 99 96*   CO2 mmol/L 17.0* 18.0* 17.1*   BUN mg/dL 76* 83* 80*   CREATININE mg/dL 2.31* 2.91* 2.80*   CALCIUM mg/dL 8.6 8.7 8.9   BILIRUBIN mg/dL  --  0.3  --    ALK PHOS U/L  --  157*  --    ALT (SGPT) U/L  --  7  --    AST (SGOT) U/L  --  10  --    GLUCOSE mg/dL 269* 550* 288*     Results from last 7 days   Lab Units 10/11/24  0455   HEMOGLOBIN g/dL 8.6*   HEMATOCRIT % 27.8*     Lab Results   Component Value Date    HGBA1C 7.90 (H) 08/24/2024          GI Function:  No BM recorded since admission        Skin: Bilateral coccyx stage 1 PI       Weight Review: Estimated body mass index is 27.38 kg/m² as calculated from the following:    Height as of 10/8/24: 172.7 cm (67.99\").    Weight as of 9/16/24: 81.6 kg (180 lb).    Comment:   Pt's wt fluctuating 170-230# x ~1 year. Expect some wt fluctuation with diuretic     Wt Readings from Last 30 Encounters:   09/16/24 1433 81.6 kg (180 lb)   09/10/24 1357 77.2 kg (170 lb 3.2 oz)   09/04/24 0528 96.7 kg (213 lb 3 oz)   09/03/24 0554 96.1 kg (211 lb 13.8 oz) "   09/02/24 0556 95.5 kg (210 lb 8.6 oz)   09/01/24 0650 92.4 kg (203 lb 11.3 oz)   08/31/24 0625 95.8 kg (211 lb 3.2 oz)   08/30/24 0649 96.7 kg (213 lb 3 oz)   08/29/24 0519 98.6 kg (217 lb 6 oz)   08/28/24 0536 96.2 kg (212 lb 1.3 oz)   08/27/24 0604 98.1 kg (216 lb 4.3 oz)   08/26/24 0803 97.5 kg (215 lb)   08/26/24 0700 97.6 kg (215 lb 2.7 oz)   08/25/24 0700 92.1 kg (203 lb 0.7 oz)   08/24/24 2215 93.3 kg (205 lb 11 oz)   07/18/24 1743 77.1 kg (169 lb 15.6 oz)   06/24/24 1512 77.1 kg (170 lb)   06/10/24 1255 77.1 kg (170 lb)   04/21/24 0034 84.1 kg (185 lb 6.5 oz)   04/20/24 1921 90.6 kg (199 lb 11.8 oz)   02/10/24 0527 90.6 kg (199 lb 11.8 oz)   02/09/24 0500 102 kg (225 lb 12 oz)   02/07/24 0600 102 kg (225 lb 8.5 oz)   02/06/24 0532 104 kg (229 lb 0.9 oz)   02/05/24 0548 97.8 kg (215 lb 9.8 oz)   02/04/24 0500 99.8 kg (220 lb 0.3 oz)   02/03/24 0515 99 kg (218 lb 4.1 oz)   02/01/24 0500 99.2 kg (218 lb 11.1 oz)   01/31/24 0418 95.6 kg (210 lb 12.2 oz)   01/30/24 0630 95.2 kg (209 lb 14.1 oz)   01/29/24 0525 94.7 kg (208 lb 12.4 oz)   01/28/24 0550 95.6 kg (210 lb 12.2 oz)   01/27/24 1613 88.9 kg (196 lb)   01/10/24 0027 89 kg (196 lb 3.4 oz)   01/09/24 1713 95.1 kg (209 lb 10.5 oz)   12/24/23 2324 95.1 kg (209 lb 9.6 oz)   12/24/23 1921 81.6 kg (180 lb)   12/11/23 1558 83.6 kg (184 lb 6.4 oz)   10/16/23 1602 93.5 kg (206 lb 3.2 oz)   09/23/23 2134 86.2 kg (190 lb)   08/28/23 1449 87.5 kg (193 lb)   08/24/23 1431 83 kg (183 lb)   07/29/23 0538 93.1 kg (205 lb 4 oz)   07/28/23 0500 93.8 kg (206 lb 12.7 oz)   07/27/23 0606 94.7 kg (208 lb 12.4 oz)   07/26/23 0548 94.9 kg (209 lb 3.5 oz)   07/25/23 0539 87.2 kg (192 lb 3.9 oz)   07/24/23 0520 89.7 kg (197 lb 12 oz)   07/23/23 0600 89.4 kg (197 lb 1.5 oz)   07/22/23 0513 88.7 kg (195 lb 8.8 oz)   07/21/23 2112 88.6 kg (195 lb 5.2 oz)   07/21/23 1835 77.1 kg (170 lb)   06/02/23 1510 77.2 kg (170 lb 3.2 oz)   05/25/23 1339 77.1 kg (170 lb)   05/12/23 2206 77.1 kg  (170 lb)   05/15/23 0653 77.1 kg (170 lb)   12/06/22 1411 72.6 kg (160 lb)   12/02/22 1408 72.6 kg (160 lb)   11/06/22 1632 72.6 kg (160 lb)   11/04/22 0519 93.7 kg (206 lb 9.1 oz)   09/12/22 1427 86.6 kg (191 lb)   09/01/22 2027 81.6 kg (180 lb)   06/23/22 1018 85.3 kg (188 lb)   06/23/22 0400 85.3 kg (188 lb)   06/09/22 1425 85.3 kg (188 lb)   06/02/22 1349 84.6 kg (186 lb 9.6 oz)   05/27/22 0217 80 kg (176 lb 5.9 oz)   05/26/22 2201 79.4 kg (175 lb)   03/28/22 0610 83.9 kg (185 lb)   03/26/22 1023 83.9 kg (185 lb)          --       Nutrition Problem Statement: No acute nutrition dx at this time        Nutrition Intervention: Continue current diet and encourage good PO intake.           Monitoring/Evaluation Per protocol, PO intake, Pertinent labs, Weight          RD to follow up per protocol.    Electronically signed by:  Kate Hercules RD  10/11/24 11:23 EDT

## 2024-10-11 NOTE — SIGNIFICANT NOTE
10/11/24 1126   OTHER   Discipline physical therapist   Rehab Time/Intention   Session Not Performed patient/family declined evaluation  (Pt reports her LEs hurt too much to attempt to sit EOB. Pt is agreeable to attempting PT eval tomorrow if LEs are less painful.)   Recommendation   PT - Next Appointment 10/12/24

## 2024-10-11 NOTE — PLAN OF CARE
Pt. is A&O X 4. Complained of pain to extremity. Called NP Meri Koch. Pain Meds given by IV. And then relieved good. Sleeping pills PRN given per Pt. required. See MAR. Skin care applied. External catheter tolerated well. NSR on Tele. AC&HS. Insulin scheduled given at night. RA. VSS. Call light within reach.      Goal Outcome Evaluation:  Plan of Care Reviewed With: patient  Progress: improving       Problem: Adult Inpatient Plan of Care  Goal: Plan of Care Review  Outcome: Progressing  Flowsheets (Taken 10/11/2024 0458)  Progress: improving  Plan of Care Reviewed With: patient  Goal: Patient-Specific Goal (Individualized)  Outcome: Progressing  Goal: Absence of Hospital-Acquired Illness or Injury  Outcome: Progressing  Intervention: Identify and Manage Fall Risk  Recent Flowsheet Documentation  Taken 10/11/2024 0401 by Blu Wilder RN  Safety Promotion/Fall Prevention:   safety round/check completed   room organization consistent   lighting adjusted   fall prevention program maintained   clutter free environment maintained   activity supervised  Taken 10/11/2024 0200 by Blu Wilder RN  Safety Promotion/Fall Prevention:   safety round/check completed   room organization consistent   lighting adjusted   fall prevention program maintained   clutter free environment maintained   activity supervised  Intervention: Prevent Skin Injury  Recent Flowsheet Documentation  Taken 10/11/2024 0401 by Blu Wilder RN  Body Position:   turned   left  Taken 10/11/2024 0200 by Blu Wilder RN  Body Position:   turned   right  Intervention: Prevent and Manage VTE (Venous Thromboembolism) Risk  Recent Flowsheet Documentation  Taken 10/11/2024 0401 by Blu Wilder RN  Activity Management: activity encouraged  Range of Motion: active ROM (range of motion) encouraged  Taken 10/11/2024 0200 by Blu Wilder RN  Activity Management: activity encouraged  VTE Prevention/Management:   sequential compression devices off   patient refused  intervention  Range of Motion: active ROM (range of motion) encouraged  Intervention: Prevent Infection  Recent Flowsheet Documentation  Taken 10/11/2024 0401 by Blu Wilder, RN  Infection Prevention:   rest/sleep promoted   single patient room provided   hand hygiene promoted  Taken 10/11/2024 0200 by Blu Wilder, RN  Infection Prevention:   rest/sleep promoted   single patient room provided   hand hygiene promoted  Goal: Optimal Comfort and Wellbeing  Outcome: Progressing  Goal: Readiness for Transition of Care  Outcome: Progressing

## 2024-10-11 NOTE — CONSULTS
Diabetes Education  Assessment/Teaching    Patient Name:  Halie Guidry  YOB: 1940  MRN: [id number]  Admit Date:  10/10/2024      Assessment Date:  10/11/2024  Flowsheet Row Most Recent Value   General Information     Referral From: MD order   Diabetes History    What type of diabetes do you have? Type 2   Have you had diabetes education/teaching in the past? yes   When and where was your diabetes education? Previous admission April 2024   Do you test your blood sugar at home? yes   Frequency of checks Continuous   Meter type Dexcom G7   Who performs the test? self   Typical readings Last 30 days, 1% very low, 2% low, 11% TIR, 7% high, 79% very high   Education Preferences    Barriers to Learning other (comment)  [None noted]   Assessment Topics    Reducing Risk - Assessment Needs education   DM Goals    Taking Medication - Goal 0-7 days from discharge   Reducing Risk - Goal 30-90 days from discharge   Contact Plan Follow-up medical care       Flowsheet Row Most Recent Value   DM Education Needs    Meter Type Other (comment)  [CGM Dexcom G7]   Healthy Coping Appropriate   Discharge Plan Facility   Motivation Engaged   Teaching Method Discussion, Explanation   Patient Response Verbalized understanding     Other Comments:  Pt resides in an assisted facility where meals are provided but pt self administers her medications.  Pt states she takes 90 units of Toujeo at night and Humalog 20 units AC TID as prescribed by Endocrinology.  Pt monitors with a Dexcom G7.      In the past, pt states she did have difficulty getting her insulin but per [facility] pharmacist, pt is getting her insulin mailed to her and received/filled both Humalog and Toujeo in September 2024 and denies current difficulty acquiring her insulin.     Pt states she believes she missed taking her insulin but uncertain which one or both and also uncertain how many days missed.  Pt states she has not had previous memory issues but  acknowledges she may if she is missing her doses.      Above discussed with rounding PAUL VALENTINE and CCP.  Pt may benefit from the facility providing her medications or even a simple reminder.  CCP to discuss with pt's son.      Electronically signed by:  Shani Villagomez RN, [facility]  10/11/24 11:10 EDT

## 2024-10-11 NOTE — PROGRESS NOTES
Discharge Planning Assessment  [facility]     Patient Name: Halie Guidry  MRN: [id number]  Today's Date: 10/11/2024    Admit Date: 10/10/2024    Plan: Return to [place] [facility] with Don[facility]  following   Discharge Needs Assessment       Row Name 10/11/24 1256       Living Environment    People in Home facility resident    Current Living Arrangements assisted living facility    Potentially Unsafe Housing Conditions none    Primary Care Provided by self    Provides Primary Care For no one, unable/limited ability to care for self    Family Caregiver if Needed child(beatriz), adult    Family Caregiver Names Son Derrick [phone]    Quality of Family Relationships helpful    Able to Return to Prior Arrangements other (see comments)  Unsure - will follow progress       Resource/Environmental Concerns    Transportation Concerns none       Transition Planning    Patient/Family Anticipates Transition to home    Patient/Family Anticipated Services at Transition home health care    Transportation Anticipated health plan transportation       Discharge Needs Assessment    Readmission Within the Last 30 Days no previous admission in last 30 days    Equipment Currently Used at Home hospital bed;wheelchair;walker, rolling;glucometer;grab bar;shower chair  Dexicom    Concerns to be Addressed denies needs/concerns at this time    Anticipated Changes Related to Illness none    Equipment Needed After Discharge none    Provided Post Acute Provider List? N/A    N/A Provider List Comment Current with Don[facility]                   Discharge Plan       Row Name 10/11/24 3105       Plan    Plan Return to [place] [facility] with Don[facility] following    Patient/Family in Agreement with Plan yes    Plan Comments Spoke with patient at bedside.  She is form AL @ [place].  She has a walker, W/C, grab bars, shower chair, hospital bed, Dexicom.  She is current with Don[facility]  and has been to SNF at [facility]  @ [facility].  PCP is Dr. Napoleon Mead and pharmacy is [facility] on [address].  Son Derrick [phone] or son Josse [phone] are emergency contacts.  Juliana Aguero has a van that she uses for 's appointments.  Call to son Derrick at request of diabetic clinician.  Patient takes her own meds.  Son feels she is competent to give her own insuling.  Unable to stand up today with PT.  Patient states she does not want to return to SNF but wants to return to AL.  CCP will follow.  Connor MILLER      Row Name 10/11/24 1014       Plan    Plan Comments Late entry-- Received call on 10/10 from Yani, clinical liaison with [facility], informing that patient is current with their services. Request that I place referral for [facility] in liaison in-basket for them to follow for discharge needs. HEENA Ruggiero RN                  Continued Care and Services - Admitted Since 10/10/2024       Home Medical Care Coordination complete.      Service Provider Request Status Selected Services Address Phone Fax Patient Preferred    [facility]  Selected Home Health Services [address], [address], [place] [address] [phone]        Internal Comment last updated by Alon Hernandez, RN 10/11/2024 1010    Patient is current with [facility]. HEENA Ruggiero RN                             Selected Continued Care - Episodes Includes continued care and service providers with selected services from the active episodes listed below      Lite Endocrine Disorders Episode start date: 9/10/2024   There are no active outsourced providers for this episode.             Chronic Care Management Episode start date: 7/31/2024   There are no active outsourced providers for this episode.                 Selected Continued Care - Prior Encounters Includes continued care and service providers with selected services from prior encounters from 7/12/2024 to 10/11/2024      Discharged on 9/4/2024 Admission  date: 8/24/2024 - Discharge disposition: Skilled Nursing Facility (DC - External)      Destination       Service Provider Selected Services Address Phone Fax Patient Preferred    [facility] [address] [address] [phone] --                          Expected Discharge Date and Time       Expected Discharge Date Expected Discharge Time    Oct 12, 2024            Demographic Summary       Row Name 10/11/24 1255       General Information    Admission Type observation    Arrived From home    Referral Source admission list    Reason for Consult discharge planning    Preferred Language English                   Functional Status       Row Name 10/11/24 1255       Functional Status    Usual Activity Tolerance fair    Current Activity Tolerance poor       Functional Status, IADL    Medications independent    Meal Preparation assistive person    Housekeeping completely dependent    Laundry completely dependent    Shopping completely dependent       Mental Status    General Appearance WDL WDL       Mental Status Summary    Recent Changes in Mental Status/Cognitive Functioning no changes                               Becky S. [place], RN

## 2024-10-11 NOTE — PROGRESS NOTES
Name: Halie Guidry ADMIT: 10/10/2024   : 1940  PCP: Napoleon Mead MD    MRN: [id number] LOS: 0 days   AGE/SEX: 84 y.o. female  ROOM: [facility]/     Subjective   Subjective Sitting up in bed in no acute distress she reports that her Dexcom has been reading high, she was unsure why was doing this, did not take insulin she was not sure what her blood glucose level was.  She presents assisted living, and does not have any assistance with her medications.  She complains of bilateral lower extremity edema, erythema, and pain.-Reports pain is burning, and is worse with any palpation.          Objective   Objective   Vital Signs  Temp:  [97.7 °F (36.5 °C)-98.1 °F (36.7 °C)] 98.1 °F (36.7 °C)  Heart Rate:  [65-77] 77  Resp:  [16-18] 16  BP: (145-209)/(52-87) 151/61  SpO2:  [95 %-98 %] 98 %  on   ;   Device (Oxygen Therapy): room air  There is no height or weight on file to calculate BMI.  Physical Exam  Vitals and nursing note reviewed.   Constitutional:       General: She is not in acute distress.     Appearance: Normal appearance.   HENT:      Head: Normocephalic and atraumatic.      Mouth/Throat:      Mouth: Mucous membranes are moist.      Pharynx: No posterior oropharyngeal erythema.   Eyes:      General: No scleral icterus.  Neck:      Vascular: No JVD.   Cardiovascular:      Rate and Rhythm: Normal rate and regular rhythm.      Pulses: Normal pulses.      Heart sounds: Normal heart sounds. No murmur heard.  Pulmonary:      Effort: Pulmonary effort is normal. No respiratory distress.      Breath sounds: Normal breath sounds.   Abdominal:      General: Bowel sounds are normal. There is no distension.      Palpations: Abdomen is soft.      Tenderness: There is no abdominal tenderness.   Musculoskeletal:         General: No swelling or tenderness.      Cervical back: Neck supple.      Right lower leg: Edema present.      Left lower leg: Edema present.   Skin:     General: Skin is warm and dry.       Capillary Refill: Capillary refill takes less than 2 seconds.      Coloration: Skin is not jaundiced.      Findings: Erythema and wound present. No rash.      Comments: Bilateral lower extremities    Neurological:      General: No focal deficit present.      Mental Status: She is alert. Mental status is at baseline.      Motor: Weakness present.   Psychiatric:         Mood and Affect: Mood normal.         Behavior: Behavior normal.       Results Review     I reviewed the patient's new clinical results.  Results from last 7 days   Lab Units 10/11/24  0455 10/10/24  1220 10/08/24  1401   WBC 10*3/mm3 11.27* 9.10 10.55   HEMOGLOBIN g/dL 8.6* 9.4* 10.3*   PLATELETS 10*3/mm3 350 348 442     Results from last 7 days   Lab Units 10/11/24  0455 10/10/24  1220 10/08/24  1401   SODIUM mmol/L 135* 130* 129*   POTASSIUM mmol/L 4.8 5.2 5.4*   CHLORIDE mmol/L 106 99 96*   CO2 mmol/L 17.0* 18.0* 17.1*   BUN mg/dL 76* 83* 80*   CREATININE mg/dL 2.31* 2.91* 2.80*   GLUCOSE mg/dL 269* 550* 288*   EGFR mL/min/1.73 20.4* 15.4*  --      Results from last 7 days   Lab Units 10/10/24  1220   ALBUMIN g/dL 3.5   BILIRUBIN mg/dL 0.3   ALK PHOS U/L 157*   AST (SGOT) U/L 10   ALT (SGPT) U/L 7     Results from last 7 days   Lab Units 10/11/24  0455 10/10/24  1220 10/08/24  1401   CALCIUM mg/dL 8.6 8.7 8.9   ALBUMIN g/dL  --  3.5  --        Glucose   Date/Time Value Ref Range Status   10/11/2024 0615 304 (H) 70 - 130 mg/dL Final   10/10/2024 2047 366 (H) 70 - 130 mg/dL Final   10/10/2024 1740 363 (H) 70 - 130 mg/dL Final   10/10/2024 1428 474 (C) 70 - 130 mg/dL Final   10/10/2024 1208 548 (C) 70 - 130 mg/dL Final       No radiology results for the last day    I have personally reviewed all medications:  Scheduled Medications  ammonium lactate, 1 Application, Topical, BID  atorvastatin, 80 mg, Oral, Nightly  bisoprolol, 5 mg, Oral, Daily  bumetanide, 2 mg, Oral, BID  cefTRIAXone, 1,000 mg, Intravenous, Q24H  DULoxetine, 30 mg, Oral,  Daily  ferrous sulfate, 325 mg, Oral, Every Other Day  hydrALAZINE, 50 mg, Oral, Q8H  insulin glargine, 40 Units, Subcutaneous, Daily  insulin lispro, 2-9 Units, Subcutaneous, 4x Daily AC & at Bedtime  levothyroxine, 137 mcg, Oral, Q AM  pantoprazole, 40 mg, Oral, Q AM  pregabalin, 75 mg, Oral, BID  spironolactone, 50 mg, Oral, Daily  terazosin, 1 mg, Oral, Nightly    Infusions   Diet  Diet: Cardiac, Diabetic; Healthy Heart (2-3 Na+); Consistent Carbohydrate; Fluid Consistency: Thin (IDDSI 0)    I have personally reviewed:  [x]  Laboratory   [x]  Microbiology   [x]  Radiology   [x]  EKG/Telemetry  [x]  Cardiology/Vascular   []  Pathology    []  Records       Assessment/Plan     Active Hospital Problems    Diagnosis  POA    **Acute UTI [N39.0]  Yes    CKD (chronic kidney disease) stage 4, GFR 15-29 ml/min [N18.4]  Yes    Stage 3a chronic kidney disease [N18.31]  Yes    Anxiety disorder [F41.9]  Yes    Tremor [R25.1]  Yes    Severe hypothyroidism [E03.9]  Yes    Type 2 diabetes mellitus with hyperglycemia, with long-term current use of insulin [E11.65, Z79.4]  Not Applicable      Resolved Hospital Problems   No resolved problems to display.       84 y.o. female admitted  with hyperglycemia  Blood glucose level 548 at time of admission  DM Educator consulted  She lives in assisted living-she currently manges her own medications   Reports her Dexcom was reading high, so she did not take any insulin.  She is also noted to have metabolic acidosis normal anion gap  Bicarb 17, manage 447, BUN 76, sodium 133  IV fluid bolus given in ER followed by 20 Units of Lantus     Type II diabetes mellitus  With chronic use of insulin  Complicated by Autonomic neuropathy  Diabetic foot ulcer- History of   SSI - for hyperglycemia   Continue Lantus  A1c 7.90 on August 24, 2024 will recheck in a.m.  Will need to monitor and adjust as needed     Metabolic Acidosis   Normal anion gap   Bicarb  17  Sodium also low,  BUN is 76, creatinine  2.47  Nephrology consulted appreciate their assistance and recommendations   Transfer patient off of  observation unit     CKD chronic disease stage IV  VIPUL ATN  Elevated creatinine, BUN  Nephrology consulted as above  Appreciate their recommendations and assistance  Monitor I's and O's  Daily weights  Nephrotoxins    Anemia chronic kidney disease  Chronic   HBG 8.6. Monitor and infuse if hbg<6 or symptomatic anemia   Continue iron supplement    Urinary tract infection  Noted on UA  Continue ceftriaxone  Follow blood and urine cultures    Bilateral lower extremity edema  Cellulitic- Will ask wound care nurse to evaluate   Complaints of neuropathic pain-trial of gabapentin and Norco   Wound care consulted appreciate their recommendations   Duplex dopplers ordered to rule out DVT -    Hypothyroidism  Continue levothyroxine    HFpEF  Check Pro BnP  , Bumex. Bisoprolol, spironolactone at baseline - Bumex and spironolactone held pending Nephrology evaluation       SCDs for DVT prophylaxis.  Full code.  Discussed with patient, nursing staff, care team on multidisciplinary rounds, and Dr Dasilva  .  Anticipate discharge to SNU facility when cleared by consultants.  Expected Discharge Date: 10/12/2024; Expected Discharge Time:       SERGE Medina  [facility]  10/11/24  10:57 EDT

## 2024-10-11 NOTE — CASE MANAGEMENT/SOCIAL WORK
Discharge Planning Assessment  [facility]     Patient Name: Halie Guidry  MRN: [id number]  Today's Date: 10/11/2024    Admit Date: 10/10/2024        Discharge Needs Assessment    No documentation.                  Discharge Plan       Row Name 10/11/24 1014       Plan    Plan Comments Late entry-- Received call on 10/10 from Yani, clinical liaison with [facility], informing that patient is current with their services. Request that I place referral for [facility] in liaison in-basket for them to follow for discharge needs. HEENA Ruggiero RN                  Continued Care and Services - Admitted Since 10/10/2024    No active coordination exists for this encounter.       Selected Continued Care - Episodes Includes continued care and service providers with selected services from the active episodes listed below      Lite Endocrine Disorders Episode start date: 9/10/2024   There are no active outsourced providers for this episode.             Chronic Care Management Episode start date: 7/31/2024   There are no active outsourced providers for this episode.                 Selected Continued Care - Prior Encounters Includes continued care and service providers with selected services from prior encounters from 7/12/2024 to 10/11/2024      Discharged on 9/4/2024 Admission date: 8/24/2024 - Discharge disposition: Skilled Nursing Facility (DC - External)      Destination       Service Provider Selected Services Address Phone Fax Patient Preferred    [facility] [address] [address] [phone] --                          Expected Discharge Date and Time       Expected Discharge Date Expected Discharge Time    Oct 12, 2024            Demographic Summary    No documentation.                  Functional Status    No documentation.                  Psychosocial    No documentation.                  Abuse/Neglect    No documentation.                   Legal    No documentation.                  Substance Abuse    No documentation.                  Patient Forms    No documentation.                     Alon Hernandez RN

## 2024-10-12 ENCOUNTER — APPOINTMENT (OUTPATIENT)
Dept: CARDIOLOGY | Facility: HOSPITAL | Age: 84
DRG: 689 | End: 2024-10-12
Payer: MEDICARE

## 2024-10-12 LAB
ALBUMIN SERPL-MCNC: 3.4 G/DL (ref 3.5–5.2)
ALBUMIN/GLOB SERPL: 0.9 G/DL
ALP SERPL-CCNC: 131 U/L (ref 39–117)
ALT SERPL W P-5'-P-CCNC: <5 U/L (ref 1–33)
ANION GAP SERPL CALCULATED.3IONS-SCNC: 10.7 MMOL/L (ref 5–15)
AST SERPL-CCNC: 8 U/L (ref 1–32)
BILIRUB SERPL-MCNC: 0.2 MG/DL (ref 0–1.2)
BUN SERPL-MCNC: 68 MG/DL (ref 8–23)
BUN/CREAT SERPL: 27 (ref 7–25)
CALCIUM SPEC-SCNC: 8.4 MG/DL (ref 8.6–10.5)
CHLORIDE SERPL-SCNC: 107 MMOL/L (ref 98–107)
CHLORIDE UR-SCNC: 28 MMOL/L
CO2 SERPL-SCNC: 18.3 MMOL/L (ref 22–29)
CREAT SERPL-MCNC: 2.52 MG/DL (ref 0.57–1)
CREAT UR-MCNC: 110.9 MG/DL
DEPRECATED RDW RBC AUTO: 50.3 FL (ref 37–54)
EGFRCR SERPLBLD CKD-EPI 2021: 18.4 ML/MIN/1.73
ERYTHROCYTE [DISTWIDTH] IN BLOOD BY AUTOMATED COUNT: 15.9 % (ref 12.3–15.4)
GLOBULIN UR ELPH-MCNC: 3.8 GM/DL
GLUCOSE BLDC GLUCOMTR-MCNC: 254 MG/DL (ref 70–130)
GLUCOSE BLDC GLUCOMTR-MCNC: 272 MG/DL (ref 70–130)
GLUCOSE BLDC GLUCOMTR-MCNC: 284 MG/DL (ref 70–130)
GLUCOSE BLDC GLUCOMTR-MCNC: 300 MG/DL (ref 70–130)
GLUCOSE BLDC GLUCOMTR-MCNC: 368 MG/DL (ref 70–130)
GLUCOSE SERPL-MCNC: 256 MG/DL (ref 65–99)
HBA1C MFR BLD: 10.3 % (ref 4.8–5.6)
HCT VFR BLD AUTO: 30.3 % (ref 34–46.6)
HGB BLD-MCNC: 9.4 G/DL (ref 12–15.9)
MCH RBC QN AUTO: 27.1 PG (ref 26.6–33)
MCHC RBC AUTO-ENTMCNC: 31 G/DL (ref 31.5–35.7)
MCV RBC AUTO: 87.3 FL (ref 79–97)
PLATELET # BLD AUTO: 321 10*3/MM3 (ref 140–450)
PMV BLD AUTO: 9.7 FL (ref 6–12)
POTASSIUM SERPL-SCNC: 4.6 MMOL/L (ref 3.5–5.2)
PROT ?TM UR-MCNC: 332.5 MG/DL
PROT SERPL-MCNC: 7.2 G/DL (ref 6–8.5)
PROT/CREAT UR: 2998.2 MG/G CREA (ref 0–200)
RBC # BLD AUTO: 3.47 10*6/MM3 (ref 3.77–5.28)
SODIUM SERPL-SCNC: 136 MMOL/L (ref 136–145)
SODIUM UR-SCNC: 51 MMOL/L
WBC NRBC COR # BLD AUTO: 9.3 10*3/MM3 (ref 3.4–10.8)

## 2024-10-12 PROCEDURE — 80053 COMPREHEN METABOLIC PANEL: CPT | Performed by: NURSE PRACTITIONER

## 2024-10-12 PROCEDURE — 84156 ASSAY OF PROTEIN URINE: CPT | Performed by: INTERNAL MEDICINE

## 2024-10-12 PROCEDURE — 63710000001 INSULIN LISPRO (HUMAN) PER 5 UNITS: Performed by: NURSE PRACTITIONER

## 2024-10-12 PROCEDURE — 25010000002 ERTAPENEM PER 500 MG: Performed by: HOSPITALIST

## 2024-10-12 PROCEDURE — 82948 REAGENT STRIP/BLOOD GLUCOSE: CPT

## 2024-10-12 PROCEDURE — 25010000002 BUMETANIDE PER 0.5 MG: Performed by: INTERNAL MEDICINE

## 2024-10-12 PROCEDURE — 85027 COMPLETE CBC AUTOMATED: CPT | Performed by: NURSE PRACTITIONER

## 2024-10-12 PROCEDURE — 25010000002 ENOXAPARIN PER 10 MG: Performed by: HOSPITALIST

## 2024-10-12 PROCEDURE — 83036 HEMOGLOBIN GLYCOSYLATED A1C: CPT | Performed by: NURSE PRACTITIONER

## 2024-10-12 PROCEDURE — 25010000002 MORPHINE PER 10 MG: Performed by: HOSPITALIST

## 2024-10-12 PROCEDURE — 82436 ASSAY OF URINE CHLORIDE: CPT | Performed by: INTERNAL MEDICINE

## 2024-10-12 PROCEDURE — 99223 1ST HOSP IP/OBS HIGH 75: CPT | Performed by: INTERNAL MEDICINE

## 2024-10-12 PROCEDURE — 25010000002 CEFTRIAXONE PER 250 MG: Performed by: NURSE PRACTITIONER

## 2024-10-12 PROCEDURE — 63710000001 INSULIN LISPRO (HUMAN) PER 5 UNITS: Performed by: HOSPITALIST

## 2024-10-12 PROCEDURE — 84300 ASSAY OF URINE SODIUM: CPT | Performed by: INTERNAL MEDICINE

## 2024-10-12 PROCEDURE — 63710000001 INSULIN GLARGINE PER 5 UNITS: Performed by: INTERNAL MEDICINE

## 2024-10-12 PROCEDURE — 82570 ASSAY OF URINE CREATININE: CPT | Performed by: INTERNAL MEDICINE

## 2024-10-12 PROCEDURE — 25010000002 HYDRALAZINE PER 20 MG: Performed by: NURSE PRACTITIONER

## 2024-10-12 RX ORDER — INSULIN LISPRO 100 [IU]/ML
7 INJECTION, SOLUTION INTRAVENOUS; SUBCUTANEOUS
Status: DISCONTINUED | OUTPATIENT
Start: 2024-10-12 | End: 2024-10-13

## 2024-10-12 RX ORDER — SACCHAROMYCES BOULARDII 250 MG
500 CAPSULE ORAL 2 TIMES DAILY
Status: COMPLETED | OUTPATIENT
Start: 2024-10-12 | End: 2024-10-19

## 2024-10-12 RX ORDER — SODIUM BICARBONATE 650 MG/1
1300 TABLET ORAL 3 TIMES DAILY
Status: DISCONTINUED | OUTPATIENT
Start: 2024-10-12 | End: 2024-10-18

## 2024-10-12 RX ORDER — HYDROCODONE BITARTRATE AND ACETAMINOPHEN 10; 325 MG/1; MG/1
1 TABLET ORAL EVERY 6 HOURS PRN
Status: DISPENSED | OUTPATIENT
Start: 2024-10-12 | End: 2024-10-17

## 2024-10-12 RX ORDER — MORPHINE SULFATE 2 MG/ML
2 INJECTION, SOLUTION INTRAMUSCULAR; INTRAVENOUS EVERY 4 HOURS PRN
Status: DISCONTINUED | OUTPATIENT
Start: 2024-10-12 | End: 2024-10-14

## 2024-10-12 RX ORDER — BUMETANIDE 0.25 MG/ML
4 INJECTION INTRAMUSCULAR; INTRAVENOUS EVERY 8 HOURS
Status: COMPLETED | OUTPATIENT
Start: 2024-10-12 | End: 2024-10-13

## 2024-10-12 RX ORDER — ENOXAPARIN SODIUM 100 MG/ML
30 INJECTION SUBCUTANEOUS EVERY 24 HOURS
Status: DISCONTINUED | OUTPATIENT
Start: 2024-10-12 | End: 2024-10-19 | Stop reason: HOSPADM

## 2024-10-12 RX ADMIN — INSULIN LISPRO 6 UNITS: 100 INJECTION, SOLUTION INTRAVENOUS; SUBCUTANEOUS at 20:19

## 2024-10-12 RX ADMIN — INSULIN LISPRO 8 UNITS: 100 INJECTION, SOLUTION INTRAVENOUS; SUBCUTANEOUS at 11:30

## 2024-10-12 RX ADMIN — TERAZOSIN 1 MG: 1 CAPSULE ORAL at 20:16

## 2024-10-12 RX ADMIN — HYDRALAZINE HYDROCHLORIDE 50 MG: 50 TABLET ORAL at 14:41

## 2024-10-12 RX ADMIN — HYDROCODONE BITARTRATE AND ACETAMINOPHEN 1 TABLET: 7.5; 325 TABLET ORAL at 14:42

## 2024-10-12 RX ADMIN — LIDOCAINE 1 APPLICATION: 4 CREAM TOPICAL at 08:35

## 2024-10-12 RX ADMIN — INSULIN LISPRO 6 UNITS: 100 INJECTION, SOLUTION INTRAVENOUS; SUBCUTANEOUS at 08:34

## 2024-10-12 RX ADMIN — PREGABALIN 100 MG: 75 CAPSULE ORAL at 20:16

## 2024-10-12 RX ADMIN — HYDROCODONE BITARTRATE AND ACETAMINOPHEN 1 TABLET: 7.5; 325 TABLET ORAL at 00:33

## 2024-10-12 RX ADMIN — INSULIN GLARGINE 40 UNITS: 100 INJECTION, SOLUTION SUBCUTANEOUS at 08:34

## 2024-10-12 RX ADMIN — ERTAPENEM SODIUM 500 MG: 1 INJECTION, POWDER, LYOPHILIZED, FOR SOLUTION INTRAMUSCULAR; INTRAVENOUS at 15:09

## 2024-10-12 RX ADMIN — LEVOTHYROXINE SODIUM 137 MCG: 137 TABLET ORAL at 06:00

## 2024-10-12 RX ADMIN — ANORECTAL OINTMENT 1 APPLICATION: 15.7; .44; 24; 20.6 OINTMENT TOPICAL at 08:35

## 2024-10-12 RX ADMIN — HYDRALAZINE HYDROCHLORIDE 10 MG: 20 INJECTION INTRAMUSCULAR; INTRAVENOUS at 12:45

## 2024-10-12 RX ADMIN — MORPHINE SULFATE 2 MG: 2 INJECTION, SOLUTION INTRAMUSCULAR; INTRAVENOUS at 16:42

## 2024-10-12 RX ADMIN — INSULIN LISPRO 7 UNITS: 100 INJECTION, SOLUTION INTRAVENOUS; SUBCUTANEOUS at 17:26

## 2024-10-12 RX ADMIN — HYDRALAZINE HYDROCHLORIDE 50 MG: 50 TABLET ORAL at 06:01

## 2024-10-12 RX ADMIN — HYDROCODONE BITARTRATE AND ACETAMINOPHEN 1 TABLET: 10; 325 TABLET ORAL at 20:17

## 2024-10-12 RX ADMIN — BUMETANIDE 4 MG: 0.25 INJECTION INTRAMUSCULAR; INTRAVENOUS at 18:58

## 2024-10-12 RX ADMIN — HYDROCODONE BITARTRATE AND ACETAMINOPHEN 1 TABLET: 7.5; 325 TABLET ORAL at 08:34

## 2024-10-12 RX ADMIN — SODIUM BICARBONATE 1300 MG: 650 TABLET ORAL at 16:42

## 2024-10-12 RX ADMIN — BUMETANIDE 4 MG: 0.25 INJECTION INTRAMUSCULAR; INTRAVENOUS at 11:30

## 2024-10-12 RX ADMIN — ANORECTAL OINTMENT 1 APPLICATION: 15.7; .44; 24; 20.6 OINTMENT TOPICAL at 20:18

## 2024-10-12 RX ADMIN — HYDRALAZINE HYDROCHLORIDE 50 MG: 50 TABLET ORAL at 21:24

## 2024-10-12 RX ADMIN — DULOXETINE HYDROCHLORIDE 30 MG: 30 CAPSULE, DELAYED RELEASE ORAL at 08:34

## 2024-10-12 RX ADMIN — PREGABALIN 75 MG: 75 CAPSULE ORAL at 08:35

## 2024-10-12 RX ADMIN — MUPIROCIN 1 APPLICATION: 20 OINTMENT TOPICAL at 20:20

## 2024-10-12 RX ADMIN — CEFTRIAXONE SODIUM 1000 MG: 1 INJECTION, POWDER, FOR SOLUTION INTRAMUSCULAR; INTRAVENOUS at 11:29

## 2024-10-12 RX ADMIN — GABAPENTIN 100 MG: 100 CAPSULE ORAL at 14:41

## 2024-10-12 RX ADMIN — PANTOPRAZOLE SODIUM 40 MG: 40 TABLET, DELAYED RELEASE ORAL at 06:00

## 2024-10-12 RX ADMIN — SODIUM BICARBONATE 1300 MG: 650 TABLET ORAL at 11:30

## 2024-10-12 RX ADMIN — Medication 500 MG: at 20:17

## 2024-10-12 RX ADMIN — INSULIN LISPRO 6 UNITS: 100 INJECTION, SOLUTION INTRAVENOUS; SUBCUTANEOUS at 16:42

## 2024-10-12 RX ADMIN — ENOXAPARIN SODIUM 30 MG: 100 INJECTION SUBCUTANEOUS at 16:43

## 2024-10-12 RX ADMIN — MUPIROCIN 1 APPLICATION: 20 OINTMENT TOPICAL at 08:36

## 2024-10-12 RX ADMIN — ACETAMINOPHEN 325MG 650 MG: 325 TABLET ORAL at 23:00

## 2024-10-12 RX ADMIN — GABAPENTIN 100 MG: 100 CAPSULE ORAL at 06:00

## 2024-10-12 RX ADMIN — LIDOCAINE 1 APPLICATION: 4 CREAM TOPICAL at 20:18

## 2024-10-12 RX ADMIN — MORPHINE SULFATE 2 MG: 2 INJECTION, SOLUTION INTRAMUSCULAR; INTRAVENOUS at 21:24

## 2024-10-12 RX ADMIN — SODIUM BICARBONATE 1300 MG: 650 TABLET ORAL at 20:16

## 2024-10-12 RX ADMIN — ATORVASTATIN CALCIUM 80 MG: 80 TABLET, FILM COATED ORAL at 20:16

## 2024-10-12 NOTE — CONSULTS
[facility] Consult Note      Patient Name: Halie Guidry  : 1940  MRN: [id number]  Primary Care Physician:  Napoleon Mead MD  Referring Physician: Kim Barnard MD  Date of admission: 10/10/2024    Subjective     Reason for Consult:  Acute kidney injury on chronic kidney disease, stage IV    HPI:   Halie Guidry is a 84 y.o. female who presented to the emergency department with complaints of elevated blood glucose readings and lower extremity pain/redness. Pertinent past medical history includes hypertension, type II DM with neuropathy, depression, anxiety, hyperlipidemia, hypothyroidism, and arthritis. Patient has a known history of CKD and was previously followed in our office but was lost to follow up since 2022. Previous baseline creatinine at that time ~1.5 mg/dl until 2024. Patient was hospitalized at that time for hyperglycemia and VIPUL. Her creatinine peaked to 2.5 mg/dl at that time but did improve to ~ 2.0 mg/dl. She was hospitalized again in August for bilateral lower extremity cellulitis. Serum creatinine at that time ranged 2.2-2.6 mg/dl. She states she is aware she has missed several appointments with our office to follow up. Initial serum creatinine this admission was 2.8 mg/dl. She was also noted to have sodium 129, glucose 288 and potassium 5.4. Today her creatinine is down to 2.5 mg/dl with normal sodium and potassium. Her glucose is still running high. We were consulted to help manage her kidney disease. Patient currently denies any chest pain or shortness of breath. She does have some mild lower extremity swelling as well as erythema and pain. She reports good appetite. No nausea or vomiting. She does have some burning with urination.     Review of Systems:   14 point review of systems is otherwise negative except for mentioned above on HPI    Personal History     Past Medical History:   Diagnosis Date   • Acute metabolic  encephalopathy 06/24/2022   • Anxiety    • Arthritis    • Benign essential hypertension 08/20/2014   • Bleeding disorder    • Depression    • Diabetes    • Diabetes mellitus, type 2    • Disc degeneration, lumbar    • Headache, tension-type    • Hyperlipidemia    • Hypothyroidism    • Neuropathy    • Osteoporosis 09/09/2015   • Pancreatic mass     Sept 2023, on Monthly Octreotide Injection   • Peripheral neuropathy    • Rotator cuff tear, left    • Scoliosis    • Shoulder pain     LEFT, TORN ROTATOR CUFF S/P FALL   • Spinal stenosis        Past Surgical History:   Procedure Laterality Date   • APPENDECTOMY     • BILATERAL BREAST REDUCTION Bilateral 08/2015   • CATARACT EXTRACTION  03/2015   • CHOLECYSTECTOMY WITH INTRAOPERATIVE CHOLANGIOGRAM N/A 3/27/2022    Procedure: CHOLECYSTECTOMY LAPAROSCOPIC INTRAOPERATIVE CHOLANGIOGRAM;  Surgeon: Aiyana Hill MD;  Location: [facility] MAIN OR;  Service: General;  Laterality: N/A;   • COLONOSCOPY  06/05/2015    WNL   • ERCP N/A 2/25/2022    Procedure: ENDOSCOPIC RETROGRADE CHOLANGIOPANCREATOGRAPHY with sphincterotomy and balloon sweep;  Surgeon: Chilo Wilhelm MD;  Location: [facility] ENDOSCOPY;  Service: Gastroenterology;  Laterality: N/A;  PRE/POST - CBD stones   • ERCP N/A 3/28/2022    Procedure: ENDOSCOPIC RETROGRADE CHOLANGIOPANCREATOGRAPHY WITH SPHINCTEROTOMY AND BALLOON SWEEP;  Surgeon: Chilo Wilhelm MD;  Location: [facility] ENDOSCOPY;  Service: Gastroenterology;  Laterality: N/A;  PRE: COMMON DUCT STONE  POST: COMMON DUCT STONE   • KYPHOPLASTY     • REDUCTION MAMMAPLASTY     • TONSILLECTOMY         Family History: family history includes Bone cancer in her mother; Heart disease in her daughter; No Known Problems in her father.    Social History:  reports that she quit smoking about 11 years ago. Her smoking use included cigarettes. She started smoking about 51 years ago. She has a 40 pack-year smoking history. She has never used smokeless tobacco. She reports  that she does not drink alcohol and does not use drugs.    Home Medications:  Prior to Admission medications    Medication Sig Start Date End Date Taking? Authorizing Provider   acetaminophen (TYLENOL) 325 MG tablet Take 2 tablets by mouth Every 6 (Six) Hours As Needed for Mild Pain or Moderate Pain. 9/4/24  Yes Balbina Painting MD   ammonium lactate (AMLACTIN) 12 % cream Apply 1 Application topically to the appropriate area as directed 2 (Two) Times a Day. 5/1/24  Yes Raulito Disla MD   atorvastatin (LIPITOR) 80 MG tablet Take 1 tablet by mouth Every Night. Indications: High Amount of Fats in the Blood 3/14/24  Yes Lilia Orona APRN   bisoprolol (ZEBeta) 5 MG tablet Take 1 tablet by mouth Daily. 10/8/24  Yes Napoleon Mead MD   bumetanide (BUMEX) 2 MG tablet Take 1 tablet by mouth 2 (Two) Times a Day. 10/8/24  Yes Napoleon Mead MD   DULoxetine (CYMBALTA) 30 MG capsule Take 1 capsule by mouth Daily. 10/8/24  Yes Napoleon Mead MD   ferrous sulfate 325 (65 FE) MG tablet Take 1 tablet by mouth Every Other Day. 9/6/24  Yes Balbina Painting MD   fluticasone (FLONASE) 50 MCG/ACT nasal spray Administer 2 sprays into the nostril(s) as directed by provider Daily. 10/8/24  Yes Napoleon Mead MD   hydrALAZINE (APRESOLINE) 50 MG tablet Take 1 tablet by mouth Every 8 (Eight) Hours. 10/8/24  Yes Napoleon Mead MD   hydrocortisone-bacitracin-zinc oxide-nystatin (MAGIC BARRIER) Apply 1 Application topically to the appropriate area as directed 2 (Two) Times a Day. 5/1/24  Yes Raulito Disla MD   Insulin Glargine, 2 Unit Dial, (TOUJEO) 300 UNIT/ML solution pen-injector injection Inject 90 Units under the skin into the appropriate area as directed Daily. 9/10/24  Yes Lilia Orona APRN   Insulin Lispro, 1 Unit Dial, (HumaLOG KwikPen) 100 UNIT/ML solution pen-injector Inject 15-20 Units under the skin into the appropriate area as directed 3 (Three) Times a Day Before Meals. 9/10/24  Yes Lilia Orona,  SERGE   Insulin Pen Needle (BD Pen Needle Naila 2nd Gen) 32G X 4 MM misc Use 1 pen needle 4 (Four) Times a Day. 9/10/24  Yes Lilia Orona APRN   lansoprazole (PREVACID) 30 MG capsule TAKE 1 CAPSULE DAILY 11/15/23  Yes Napoleon Gutierrez MD   levothyroxine (SYNTHROID, LEVOTHROID) 137 MCG tablet Take 1 tablet by mouth Every Morning. 10/8/24  Yes Napoleon Mead MD   octreotide (sandoSTATIN) 30 MG injection Inject 30 mg into the appropriate muscle as directed by prescriber Every 28 (Twenty-Eight) Days. Next dose 9/16/2024   Yes Melvin Gusman MD   pregabalin (LYRICA) 75 MG capsule Take 1 capsule by mouth 2 (Two) Times a Day for 3 days. 9/4/24 10/10/24 Yes Balbina Painting MD   spironolactone (ALDACTONE) 50 MG tablet Take 1 tablet by mouth Daily. 10/8/24  Yes Napoleon Mead MD   terazosin (HYTRIN) 1 MG capsule Take 1 capsule by mouth Every Night. 10/8/24  Yes Napoleon Mead MD   chlorthalidone (HYGROTON) 25 MG tablet  9/3/24   Melvin Gusman MD   lisinopril (PRINIVIL,ZESTRIL) 40 MG tablet  9/3/24   ProviderMelvin MD       Allergies:  Allergies   Allergen Reactions   • Sulfa Antibiotics Unknown - High Severity     Pt says it was a long time ago and I don't remember but it wasn't good.        Objective     Vitals:   Temp:  [97.3 °F (36.3 °C)-97.6 °F (36.4 °C)] 97.6 °F (36.4 °C)  Heart Rate:  [49-66] 64  Resp:  [10-17] 10  BP: (137-193)/(56-80) 141/56    Intake/Output Summary (Last 24 hours) at 10/12/2024 1014  Last data filed at 10/12/2024 0056  Gross per 24 hour   Intake 240 ml   Output 600 ml   Net -360 ml       Physical Exam:   Constitutional: Awake, alert, no acute distress. Obese. Chronically ill appearing.  HEENT: Sclera anicteric, no conjunctival injection  Neck: No JVD  Respiratory: Clear to auscultation bilaterally, nonlabored respiration  Cardiovascular: RRR, no murmurs, no rubs or gallops, no carotid bruit  Gastrointestinal: Positive bowel sounds, abdomen is soft, nontender and  nondistended  : No palpable bladder  Extremities: Brawny BLE edema with warmth and erythema, presumed ulcerations to bilateral heels (covered with dressing), no clubbing or cyanosis  Psychiatric: Appropriate affect, cooperative  Neurologic: Oriented x3, moving all extremities, normal speech and mental status  Skin: Warm and dry       Scheduled Meds:     ammonium lactate, 1 Application, Topical, BID  atorvastatin, 80 mg, Oral, Nightly  bisoprolol, 5 mg, Oral, Daily  [Held by provider] bumetanide, 2 mg, Oral, BID  cefTRIAXone, 1,000 mg, Intravenous, Q24H  DULoxetine, 30 mg, Oral, Daily  ferrous sulfate, 325 mg, Oral, Every Other Day  gabapentin, 100 mg, Oral, Q8H  hydrALAZINE, 50 mg, Oral, Q8H  insulin glargine, 40 Units, Subcutaneous, Daily  insulin lispro, 2-9 Units, Subcutaneous, 4x Daily AC & at Bedtime  levothyroxine, 137 mcg, Oral, Q AM  Menthol-Zinc Oxide, 1 Application, Topical, Q12H  mupirocin, 1 Application, Each Nare, BID  pantoprazole, 40 mg, Oral, Q AM  pregabalin, 75 mg, Oral, BID  [Held by provider] spironolactone, 50 mg, Oral, Daily  terazosin, 1 mg, Oral, Nightly      IV Meds:        Results Reviewed:   I have personally reviewed the results from the time of this admission to 10/12/2024 10:14 EDT     Lab Results   Component Value Date    GLUCOSE 256 (H) 10/12/2024    CALCIUM 8.4 (L) 10/12/2024     10/12/2024    K 4.6 10/12/2024    CO2 18.3 (L) 10/12/2024     10/12/2024    BUN 68 (H) 10/12/2024    CREATININE 2.52 (H) 10/12/2024    EGFRIFAFRI 50 (L) 01/18/2021    EGFRIFNONA 51 (L) 02/28/2022    BCR 27.0 (H) 10/12/2024    ANIONGAP 10.7 10/12/2024      Lab Results   Component Value Date    MG 2.4 09/03/2024    PHOS 6.8 (H) 09/04/2024    ALBUMIN 3.4 (L) 10/12/2024           Assessment / Plan       CKD (chronic kidney disease) stage 4, GFR 15-29 ml/min    Type 2 diabetes mellitus with hyperglycemia, with long-term current use of insulin    Autonomic neuropathy due to secondary diabetes  mellitus    Severe hypothyroidism    Acute kidney injury (VIPUL) with acute tubular necrosis (ATN)    Anxiety disorder    Neuropathic pain    Bilateral lower extremity edema    Bilateral cellulitis of lower leg    Acute UTI (urinary tract infection)    Metabolic acidosis      ASSESSMENT:  Acute kidney injury vs. Progression of chronic kidney disease stage IV- Patient creatinine up until April of this year was previously running ~1.5 +/- but unfortunately after her hospitalization at that time for VIPUL and hyperglycemia her kidney function did not recover to baseline. For the last two months her creatinine has been running 2.2-2.6 mg/dl. Etiology is likely progression of her kidney disease secondary to poorly controlled diabetes. Creatinine today 2.5 mg/dl which is relatively stable compared to the last two months. Co2 18.3. Electrolytes are within acceptable range today. She appears relatively euvolemic.   Hyperkalemia- Resolved. Potassium on admission 5.4. Improved with holding outpatient spironolactone and lisinopril as well as glucose correction. Potassium 4.6 today.   Hyponatremia- Resolved. This was a pseudo-hyponatremia secondary to her significantly elevated blood glucose. Sodium today 136.   Type II diabetes with renal complications- Poorly controlled. Hgb A1c 10.3 on admission. Patient states she lives in assisted living but does not receive medication assistance. She reports very poor understanding of her disease management. Will defer treatment and education to primary but did explain that this is likely the cause of her kidney disease progression and stressed the importance of getting her glucose under control.   Metabolic acidosis- Co2 18.3 today. Plan to add sodium bicarbonate.   Bilateral lower extremity cellulitis- Treatment per primary team  UTI- UA indicative of acute infection. Blood and urine cultures are pending. Currently being treated with rocephin per primary team.   History of hypertension-  Blood pressure is a little elevated however at her age we have to be careful not to overcorrect.   Anemia in chronic kidney disease- Hgb today 9.4 g/dl, improving. Unfortunately while she is being treated for active infection she would not benefit from IV iron replacement or KHUSHBU.     PLAN:  Check urine studies (urine sodium, urine chloride, urine protein to creatinine ratio).   Add sodium bicarbonate 1300 mg TID.  Will diurese today with bumetanide  Surveillance labs    I reviewed the chart and other providers notes, reviewed labs.  I discussed the case with the patient and she voiced good understanding    Thank you for involving us in the care of Halie Guidry.  Please feel free to call with any questions.    Chidi Lanier MD  10/12/24  10:14 EDT    [facility]  [phone]      Please note that portions of this note were completed with a voice recognition program.

## 2024-10-12 NOTE — CONSULTS
"Referring Provider: Dr. Dasilva    Reason for Consultation: \"ESBL UTI\"    History of present illness:  Halie Guidry is a 84 y.o. who I am asked to evaluate and give opinion for \"ESBL UTI\". History is obtained from the patient and review of the old medical records which I summarize/synthesize as follows: She came to the emergency room on 10/10 due to her glucometer reading high at home.  In the emergency room she mentioned increased thirst, increased urination, and pain with urination.  No associated fever or chills.  She has chronic venous stasis that often gets misdiagnosed to cellulitis and we have been asked to see in the past.  Most recently my partner saw her for this problem on 9/20/2024 and recommended wound care, leg elevation, and fluid management to address this problem.    Back to this admission, she had a urinalysis checked that showed 21-50 WBCs and 4+ bacteria.  Therefore she was started on ceftriaxone.  Today ID has been asked to evaluate because her urine cultures positive for ESBL Klebsiella pneumoniae.  She tells me the burning with urination is only slightly improved compared to admission.    Past Medical History:   Diagnosis Date    Acute metabolic encephalopathy 06/24/2022    Anxiety     Arthritis     Benign essential hypertension 08/20/2014    Bleeding disorder     Depression     Diabetes     Diabetes mellitus, type 2     Disc degeneration, lumbar     Headache, tension-type     Hyperlipidemia     Hypothyroidism     Neuropathy     Osteoporosis 09/09/2015    Pancreatic mass     Sept 2023, on Monthly Octreotide Injection    Peripheral neuropathy     Rotator cuff tear, left     Scoliosis     Shoulder pain     LEFT, TORN ROTATOR CUFF S/P FALL    Spinal stenosis        Past Surgical History:   Procedure Laterality Date    APPENDECTOMY      BILATERAL BREAST REDUCTION Bilateral 08/2015    CATARACT EXTRACTION  03/2015    CHOLECYSTECTOMY WITH INTRAOPERATIVE CHOLANGIOGRAM N/A 3/27/2022    Procedure: " CHOLECYSTECTOMY LAPAROSCOPIC INTRAOPERATIVE CHOLANGIOGRAM;  Surgeon: Aiyana Hill MD;  Location: [facility] MAIN OR;  Service: General;  Laterality: N/A;    COLONOSCOPY  06/05/2015    WNL    ERCP N/A 2/25/2022    Procedure: ENDOSCOPIC RETROGRADE CHOLANGIOPANCREATOGRAPHY with sphincterotomy and balloon sweep;  Surgeon: Chilo Wilhelm MD;  Location: [facility] ENDOSCOPY;  Service: Gastroenterology;  Laterality: N/A;  PRE/POST - CBD stones    ERCP N/A 3/28/2022    Procedure: ENDOSCOPIC RETROGRADE CHOLANGIOPANCREATOGRAPHY WITH SPHINCTEROTOMY AND BALLOON SWEEP;  Surgeon: Chilo Wilhelm MD;  Location: [facility] ENDOSCOPY;  Service: Gastroenterology;  Laterality: N/A;  PRE: COMMON DUCT STONE  POST: COMMON DUCT STONE    KYPHOPLASTY      REDUCTION MAMMAPLASTY      TONSILLECTOMY         Antibiotic allergies and intolerances:  Sulfa     Medications:    Current Facility-Administered Medications:     acetaminophen (TYLENOL) tablet 650 mg, 650 mg, Oral, Q4H PRN, 650 mg at 10/11/24 1253 **OR** acetaminophen (TYLENOL) 160 MG/5ML oral solution 650 mg, 650 mg, Oral, Q4H PRN **OR** acetaminophen (TYLENOL) suppository 650 mg, 650 mg, Rectal, Q4H PRN, Yanet Bartlett APRN    ammonium lactate (AMLACTIN) 12 % cream 1 Application, 1 Application, Topical, BID, Kim Barnard MD, 1 Application at 10/12/24 0835    atorvastatin (LIPITOR) tablet 80 mg, 80 mg, Oral, Nightly, Kim Barnard MD, 80 mg at 10/11/24 2125    sennosides-docusate (PERICOLACE) 8.6-50 MG per tablet 2 tablet, 2 tablet, Oral, BID PRN **AND** polyethylene glycol (MIRALAX) packet 17 g, 17 g, Oral, Daily PRN **AND** bisacodyl (DULCOLAX) EC tablet 5 mg, 5 mg, Oral, Daily PRN **AND** bisacodyl (DULCOLAX) suppository 10 mg, 10 mg, Rectal, Daily PRN, Yanet Bartlett APRN    bisoprolol (ZEBeta) tablet 5 mg, 5 mg, Oral, Daily, StinglKim MD, 5 mg at 10/11/24 0849    bumetanide (BUMEX) injection 4 mg, 4 mg, Intravenous, Q8H, Yannick,  Chidi Dumont MD, 4 mg at 10/12/24 1130    dextrose (D50W) (25 g/50 mL) IV injection 25 g, 25 g, Intravenous, Q15 Min PRN, Yanet Bartlett APRN    dextrose (GLUTOSE) oral gel 15 g, 15 g, Oral, Q15 Min PRN, Yanet Bartlett APRN    DULoxetine (CYMBALTA) DR capsule 30 mg, 30 mg, Oral, Daily, Kim Barnard MD, 30 mg at 10/12/24 0834    ertapenem (INVanz) 500 mg in sodium chloride 0.9 % 50 mL IVPB, 500 mg, Intravenous, Q24H, Nolan Dasilva MD    ferrous sulfate tablet 325 mg, 325 mg, Oral, Every Other Day, Kim Barnard MD, 325 mg at 10/11/24 0839    gabapentin (NEURONTIN) capsule 100 mg, 100 mg, Oral, Q8H, Yanet Bartlett APRN, 100 mg at 10/12/24 0600    glucagon (GLUCAGEN) injection 1 mg, 1 mg, Intramuscular, Q15 Min PRN, Yanet Bartlett APRN    hydrALAZINE (APRESOLINE) injection 10 mg, 10 mg, Intravenous, Q6H PRN, Yanet Bartlett APRN, 10 mg at 10/12/24 1245    hydrALAZINE (APRESOLINE) tablet 50 mg, 50 mg, Oral, Q8H, Kim Barnard MD, 50 mg at 10/12/24 0601    HYDROcodone-acetaminophen (NORCO) 7.5-325 MG per tablet 1 tablet, 1 tablet, Oral, Q6H PRN, Nolan Dasilva MD, 1 tablet at 10/12/24 0834    insulin glargine (LANTUS, SEMGLEE) injection 40 Units, 40 Units, Subcutaneous, Daily, Kim Barnard MD, 40 Units at 10/12/24 0834    insulin lispro (HUMALOG/ADMELOG) injection 2-9 Units, 2-9 Units, Subcutaneous, 4x Daily AC & at Bedtime, Yanet Bartlett APRN, 8 Units at 10/12/24 1130    levothyroxine (SYNTHROID, LEVOTHROID) tablet 137 mcg, 137 mcg, Oral, Q AM, StinglKim MD, 137 mcg at 10/12/24 0600    melatonin tablet 2.5 mg, 2.5 mg, Oral, Nightly PRN, Yanet Bartlett APRN, 2.5 mg at 10/10/24 2200    Menthol-Zinc Oxide 1 Application, 1 Application, Topical, Q12H, Nolan Dasilva MD, 1 Application at 10/12/24 0835    mupirocin (BACTROBAN) 2 % nasal ointment 1 Application, 1 Application, Each Nare, BID, Vidya,  Nolan WAN MD, 1 Application at 10/12/24 0836    ondansetron ODT (ZOFRAN-ODT) disintegrating tablet 4 mg, 4 mg, Oral, Q6H PRN **OR** ondansetron (ZOFRAN) injection 4 mg, 4 mg, Intravenous, Q6H PRN, Yanet Bartlett APRN    pantoprazole (PROTONIX) EC tablet 40 mg, 40 mg, Oral, Q AM, Kim Barnard MD, 40 mg at 10/12/24 0600    pregabalin (LYRICA) capsule 75 mg, 75 mg, Oral, BID, Kim Barnard MD, 75 mg at 10/12/24 0835    sodium bicarbonate tablet 1,300 mg, 1,300 mg, Oral, TID, YannickChidi MD, 1,300 mg at 10/12/24 1130    [COMPLETED] Insert Peripheral IV, , , Once **AND** sodium chloride 0.9 % flush 10 mL, 10 mL, Intravenous, PRN, Yanet Bartlett APRN    [Held by provider] spironolactone (ALDACTONE) tablet 50 mg, 50 mg, Oral, Daily, Kim Barnard MD, 50 mg at 10/11/24 0839    terazosin (HYTRIN) capsule 1 mg, 1 mg, Oral, Nightly, Kim Barnard MD, 1 mg at 10/11/24 2125      Objective   Vital Signs   Temp:  [97.3 °F (36.3 °C)-97.6 °F (36.4 °C)] 97.5 °F (36.4 °C)  Heart Rate:  [49-66] 54  Resp:  [10-17] 11  BP: (137-193)/(56-80) 175/69    Physical Exam:   General: awake, alert, NAD, very nice, sitting up in bed  Eyes: no scleral icterus  Cardiovascular: NR  Respiratory: normal work of breathing without wheezing  GI: Abdomen is not distended  Skin: BLE with venous stasis dermatitis changes  Neurological: Alert and oriented x 3  Psychiatric: Normal mood and affect     Labs:     Lab Results   Component Value Date    WBC 9.30 10/12/2024    HGB 9.4 (L) 10/12/2024    HCT 30.3 (L) 10/12/2024    MCV 87.3 10/12/2024     10/12/2024       Lab Results   Component Value Date    GLUCOSE 256 (H) 10/12/2024    BUN 68 (H) 10/12/2024    CREATININE 2.52 (H) 10/12/2024    EGFRIFNONA 51 (L) 02/28/2022    EGFRIFAFRI 50 (L) 01/18/2021    BCR 27.0 (H) 10/12/2024    CO2 18.3 (L) 10/12/2024    CALCIUM 8.4 (L) 10/12/2024    PROTENTOTREF 6.6 08/26/2024    ALBUMIN 3.4 (L) 10/12/2024     LABIL2 0.7 08/26/2024    AST 8 10/12/2024    ALT <5 10/12/2024     Lab Results   Component Value Date    HGBA1C 10.30 (H) 10/12/2024       Microbiology:  10/10 UCX: ESBL Klebsiella pneumoniae (susceptible to amikacin, ertapenem, meropenem, Zosyn)    Radiology:  none    ASSESSMENT/PLAN:  Acute cystitis due to ESBL Klebsiella pneumoniae  Chronic stasis dermatitis  Uncontrolled type 2 diabetes due to dietary indiscretion -last A1c 10.3%  History of C. difficile colitis  CKD 4    For acute cystitis due to ESBL Klebsiella pneumoniae, I recommend ertapenem 500 mg IV every 24 hours (dosed for renal function) x 3 days.  I will make sure she is on a probiotic for at least the next week given her history of C. difficile.    I did ask the lab to check fosfomycin susceptibilities on her isolate so that it might be an option for the future.    Continue local wound care to her lower extremities.  If her stasis dermatitis gets really bad, then I would recommend topical triamcinolone for a week.    Thank you for allowing me to be involved in the care of this patient. Infectious diseases will sign off at this time with antibiotics plan in place, but please call me at [phone] if any further ID questions or new ID concerns.

## 2024-10-12 NOTE — SIGNIFICANT NOTE
10/12/24 1417   OTHER   Discipline physical therapist   Rehab Time/Intention   Session Not Performed other (see comments)  (awaiting doppler results for LE, PT will f/u)   Recommendation   PT - Next Appointment 10/13/24

## 2024-10-12 NOTE — PROGRESS NOTES
[facility]     LOS: 1 day     Subjective:    CC:Hyperglycemia    DIET:  Diet Order   Procedures    Diet: Cardiac, Diabetic; Healthy Heart (2-3 Na+); Consistent Carbohydrate; Fluid Consistency: Thin (IDDSI 0)   Feeling slightly better today    Objective:    Vital Signs:  Temp:  [97.3 °F (36.3 °C)-97.6 °F (36.4 °C)] 97.5 °F (36.4 °C)  Heart Rate:  [49-66] 55  Resp:  [10-14] 11  BP: (137-193)/(56-80) 162/77    SpO2:  [93 %-98 %] 97 %  on   ;   Device (Oxygen Therapy): room air  Body mass index is 32.66 kg/m².    Physical Exam  HENT:      Head: Normocephalic and atraumatic.   Cardiovascular:      Heart sounds: No murmur heard.     No friction rub.   Pulmonary:      Effort: Pulmonary effort is normal.      Breath sounds: Normal breath sounds.   Abdominal:      General: Bowel sounds are normal. There is no distension.      Palpations: Abdomen is soft.      Tenderness: There is no abdominal tenderness.   Musculoskeletal:      Comments: Lower extremities extremely sensitive to touch, mild erythema, minimal warmth of the legs   Skin:     General: Skin is warm and dry.         Results Review:    Glucose   Date Value Ref Range Status   10/12/2024 256 (H) 65 - 99 mg/dL Final   10/11/2024 291 (H) 65 - 99 mg/dL Final   10/11/2024 269 (H) 65 - 99 mg/dL Final   10/10/2024 550 (C) 65 - 99 mg/dL Final     Results from last 7 days   Lab Units 10/12/24  0550   WBC 10*3/mm3 9.30   HEMOGLOBIN g/dL 9.4*   HEMATOCRIT % 30.3*   PLATELETS 10*3/mm3 321     Results from last 7 days   Lab Units 10/12/24  0550   SODIUM mmol/L 136   POTASSIUM mmol/L 4.6   CHLORIDE mmol/L 107   CO2 mmol/L 18.3*   BUN mg/dL 68*   CREATININE mg/dL 2.52*   CALCIUM mg/dL 8.4*   BILIRUBIN mg/dL 0.2   ALK PHOS U/L 131*   ALT (SGPT) U/L <5   AST (SGOT) U/L 8   GLUCOSE mg/dL 256*                 Cultures:  Urine Culture   Date Value Ref Range Status   10/10/2024 >100,000 CFU/mL Klebsiella pneumoniae ESBL (A)  Preliminary       I have reviewed  daily medications and changes in CPOE    Scheduled meds  ammonium lactate, 1 Application, Topical, BID  atorvastatin, 80 mg, Oral, Nightly  bisoprolol, 5 mg, Oral, Daily  bumetanide, 4 mg, Intravenous, Q8H  DULoxetine, 30 mg, Oral, Daily  enoxaparin, 30 mg, Subcutaneous, Q24H  ertapenem, 500 mg, Intravenous, Q24H  ferrous sulfate, 325 mg, Oral, Every Other Day  hydrALAZINE, 50 mg, Oral, Q8H  insulin glargine, 40 Units, Subcutaneous, Daily  insulin lispro, 2-9 Units, Subcutaneous, 4x Daily AC & at Bedtime  insulin lispro, 7 Units, Subcutaneous, TID With Meals  levothyroxine, 137 mcg, Oral, Q AM  Menthol-Zinc Oxide, 1 Application, Topical, Q12H  mupirocin, 1 Application, Each Nare, BID  pantoprazole, 40 mg, Oral, Q AM  pregabalin, 100 mg, Oral, BID  saccharomyces boulardii, 500 mg, Oral, BID  sodium bicarbonate, 1,300 mg, Oral, TID  terazosin, 1 mg, Oral, Nightly        Pharmacy to Dose enoxaparin (LOVENOX),       PRN meds    acetaminophen **OR** acetaminophen **OR** acetaminophen    senna-docusate sodium **AND** polyethylene glycol **AND** bisacodyl **AND** bisacodyl    dextrose    dextrose    glucagon (human recombinant)    hydrALAZINE    HYDROcodone-acetaminophen    melatonin    Morphine    ondansetron ODT **OR** ondansetron    Pharmacy to Dose enoxaparin (LOVENOX)    [COMPLETED] Insert Peripheral IV **AND** sodium chloride        CKD (chronic kidney disease) stage 4, GFR 15-29 ml/min    Type 2 diabetes mellitus with hyperglycemia, with long-term current use of insulin    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 female admitted  with hyperglycemia  -Elevated blood sugars above 500 on admission blood sugars are  better  -Acidosis is improving  -Patient on Lantus 40 units and will add mealtime and sliding scale insulin  -Tolerating oral intake     Type II diabetes mellitus  With chronic use of insulin  Complicated by Autonomic neuropathy  Diabetic foot ulcer- History of   -As above  -Elevated A1c     Metabolic Acidosis   -Bicarbonate improved today to 18     CKD chronic disease stage IV  VIPUL ATN  -Nephrology consultation  -Creatinine improved     Anemia chronic kidney disease     Urinary tract infection  -ESBL E. coli in urine, changed to Invanz and ask for opinion from ID     Bilateral lower extremity edema  -Legs are stable     Hypothyroidism  Continue levothyroxine     HFpEF  -IV Bumex            Nolan Dasilva MD  10/12/24  15:42 EDT

## 2024-10-12 NOTE — PLAN OF CARE
Pt alert and oriented times 4. Pt complaining of bilateral leg pain today, prn pain medication given and amlactin cream applied. Pt tolerating diet. Pt educated and updated on plan of care.

## 2024-10-12 NOTE — PROGRESS NOTES
[facility] Clinical Pharmacy Services: Enoxaparin Consult    Halie Guidry has a pharmacy consult to dose prophylactic enoxaparin per Dr. Dasilva's request.     Indication: VTE Prophylaxis  Home Anticoagulation: N/A     Relevant clinical data and objective history reviewed:  84 y.o. female   97.4 kg (214 lb 11.7 oz)   Body mass index is 32.66 kg/m².   Results from last 7 days   Lab Units 10/12/24  0550   PLATELETS 10*3/mm3 321     Estimated Creatinine Clearance: 20.3 mL/min (A) (by C-G formula based on SCr of 2.52 mg/dL (H)).    Assessment/Plan    Will start patient on 30mg subcutaneous every 24 hours, adjusted for renal function. Consult order will be discontinued but pharmacy will continue to follow.     Bob Lord [facility]  Clinical Pharmacist

## 2024-10-13 ENCOUNTER — APPOINTMENT (OUTPATIENT)
Dept: CARDIOLOGY | Facility: HOSPITAL | Age: 84
DRG: 689 | End: 2024-10-13
Payer: MEDICARE

## 2024-10-13 LAB
ALBUMIN SERPL-MCNC: 3.5 G/DL (ref 3.5–5.2)
ALBUMIN/GLOB SERPL: 1 G/DL
ALP SERPL-CCNC: 131 U/L (ref 39–117)
ALT SERPL W P-5'-P-CCNC: 5 U/L (ref 1–33)
ANION GAP SERPL CALCULATED.3IONS-SCNC: 14.6 MMOL/L (ref 5–15)
AST SERPL-CCNC: 8 U/L (ref 1–32)
BACTERIA SPEC AEROBE CULT: ABNORMAL
BASOPHILS # BLD AUTO: 0.08 10*3/MM3 (ref 0–0.2)
BASOPHILS NFR BLD AUTO: 0.8 % (ref 0–1.5)
BH CV LOWER VASCULAR LEFT COMMON FEMORAL AUGMENT: NORMAL
BH CV LOWER VASCULAR LEFT COMMON FEMORAL COMPETENT: NORMAL
BH CV LOWER VASCULAR LEFT COMMON FEMORAL COMPRESS: NORMAL
BH CV LOWER VASCULAR LEFT COMMON FEMORAL PHASIC: NORMAL
BH CV LOWER VASCULAR LEFT COMMON FEMORAL SPONT: NORMAL
BH CV LOWER VASCULAR LEFT DISTAL FEMORAL COMPRESS: NORMAL
BH CV LOWER VASCULAR LEFT GASTRONEMIUS COMPRESS: NORMAL
BH CV LOWER VASCULAR LEFT GREATER SAPH AK COMPRESS: NORMAL
BH CV LOWER VASCULAR LEFT GREATER SAPH BK COMPRESS: NORMAL
BH CV LOWER VASCULAR LEFT LESSER SAPH COMPRESS: NORMAL
BH CV LOWER VASCULAR LEFT MID FEMORAL AUGMENT: NORMAL
BH CV LOWER VASCULAR LEFT MID FEMORAL COMPETENT: NORMAL
BH CV LOWER VASCULAR LEFT MID FEMORAL COMPRESS: NORMAL
BH CV LOWER VASCULAR LEFT MID FEMORAL PHASIC: NORMAL
BH CV LOWER VASCULAR LEFT MID FEMORAL SPONT: NORMAL
BH CV LOWER VASCULAR LEFT PERONEAL COMPRESS: NORMAL
BH CV LOWER VASCULAR LEFT POPLITEAL AUGMENT: NORMAL
BH CV LOWER VASCULAR LEFT POPLITEAL COMPETENT: NORMAL
BH CV LOWER VASCULAR LEFT POPLITEAL COMPRESS: NORMAL
BH CV LOWER VASCULAR LEFT POPLITEAL PHASIC: NORMAL
BH CV LOWER VASCULAR LEFT POPLITEAL SPONT: NORMAL
BH CV LOWER VASCULAR LEFT POSTERIOR TIBIAL COMPRESS: NORMAL
BH CV LOWER VASCULAR LEFT PROFUNDA FEMORAL COMPRESS: NORMAL
BH CV LOWER VASCULAR LEFT PROXIMAL FEMORAL COMPRESS: NORMAL
BH CV LOWER VASCULAR LEFT SAPHENOFEMORAL JUNCTION COMPRESS: NORMAL
BH CV LOWER VASCULAR RIGHT COMMON FEMORAL AUGMENT: NORMAL
BH CV LOWER VASCULAR RIGHT COMMON FEMORAL COMPETENT: NORMAL
BH CV LOWER VASCULAR RIGHT COMMON FEMORAL COMPRESS: NORMAL
BH CV LOWER VASCULAR RIGHT COMMON FEMORAL PHASIC: NORMAL
BH CV LOWER VASCULAR RIGHT COMMON FEMORAL SPONT: NORMAL
BH CV LOWER VASCULAR RIGHT DISTAL FEMORAL COMPRESS: NORMAL
BH CV LOWER VASCULAR RIGHT GASTRONEMIUS COMPRESS: NORMAL
BH CV LOWER VASCULAR RIGHT GREATER SAPH AK COMPRESS: NORMAL
BH CV LOWER VASCULAR RIGHT GREATER SAPH BK COMPRESS: NORMAL
BH CV LOWER VASCULAR RIGHT LESSER SAPH COMPRESS: NORMAL
BH CV LOWER VASCULAR RIGHT MID FEMORAL AUGMENT: NORMAL
BH CV LOWER VASCULAR RIGHT MID FEMORAL COMPETENT: NORMAL
BH CV LOWER VASCULAR RIGHT MID FEMORAL COMPRESS: NORMAL
BH CV LOWER VASCULAR RIGHT MID FEMORAL PHASIC: NORMAL
BH CV LOWER VASCULAR RIGHT MID FEMORAL SPONT: NORMAL
BH CV LOWER VASCULAR RIGHT PERONEAL COMPRESS: NORMAL
BH CV LOWER VASCULAR RIGHT POPLITEAL AUGMENT: NORMAL
BH CV LOWER VASCULAR RIGHT POPLITEAL COMPETENT: NORMAL
BH CV LOWER VASCULAR RIGHT POPLITEAL COMPRESS: NORMAL
BH CV LOWER VASCULAR RIGHT POPLITEAL PHASIC: NORMAL
BH CV LOWER VASCULAR RIGHT POPLITEAL SPONT: NORMAL
BH CV LOWER VASCULAR RIGHT POSTERIOR TIBIAL COMPRESS: NORMAL
BH CV LOWER VASCULAR RIGHT PROFUNDA FEMORAL COMPRESS: NORMAL
BH CV LOWER VASCULAR RIGHT PROXIMAL FEMORAL COMPRESS: NORMAL
BH CV LOWER VASCULAR RIGHT SAPHENOFEMORAL JUNCTION COMPRESS: NORMAL
BILIRUB SERPL-MCNC: <0.2 MG/DL (ref 0–1.2)
BUN SERPL-MCNC: 72 MG/DL (ref 8–23)
BUN/CREAT SERPL: 30.4 (ref 7–25)
CALCIUM SPEC-SCNC: 8.5 MG/DL (ref 8.6–10.5)
CHLORIDE SERPL-SCNC: 99 MMOL/L (ref 98–107)
CO2 SERPL-SCNC: 20.4 MMOL/L (ref 22–29)
CREAT SERPL-MCNC: 2.37 MG/DL (ref 0.57–1)
DEPRECATED RDW RBC AUTO: 50.8 FL (ref 37–54)
EGFRCR SERPLBLD CKD-EPI 2021: 19.8 ML/MIN/1.73
EOSINOPHIL # BLD AUTO: 0.41 10*3/MM3 (ref 0–0.4)
EOSINOPHIL NFR BLD AUTO: 4.3 % (ref 0.3–6.2)
ERYTHROCYTE [DISTWIDTH] IN BLOOD BY AUTOMATED COUNT: 16.1 % (ref 12.3–15.4)
GLOBULIN UR ELPH-MCNC: 3.6 GM/DL
GLUCOSE BLDC GLUCOMTR-MCNC: 134 MG/DL (ref 70–130)
GLUCOSE BLDC GLUCOMTR-MCNC: 163 MG/DL (ref 70–130)
GLUCOSE BLDC GLUCOMTR-MCNC: 196 MG/DL (ref 70–130)
GLUCOSE BLDC GLUCOMTR-MCNC: 244 MG/DL (ref 70–130)
GLUCOSE SERPL-MCNC: 234 MG/DL (ref 65–99)
HCT VFR BLD AUTO: 30.2 % (ref 34–46.6)
HGB BLD-MCNC: 9.6 G/DL (ref 12–15.9)
IMM GRANULOCYTES # BLD AUTO: 0.04 10*3/MM3 (ref 0–0.05)
IMM GRANULOCYTES NFR BLD AUTO: 0.4 % (ref 0–0.5)
LYMPHOCYTES # BLD AUTO: 0.8 10*3/MM3 (ref 0.7–3.1)
LYMPHOCYTES NFR BLD AUTO: 8.4 % (ref 19.6–45.3)
MAGNESIUM SERPL-MCNC: 1.6 MG/DL (ref 1.6–2.4)
MCH RBC QN AUTO: 27.4 PG (ref 26.6–33)
MCHC RBC AUTO-ENTMCNC: 31.8 G/DL (ref 31.5–35.7)
MCV RBC AUTO: 86.3 FL (ref 79–97)
MONOCYTES # BLD AUTO: 0.79 10*3/MM3 (ref 0.1–0.9)
MONOCYTES NFR BLD AUTO: 8.3 % (ref 5–12)
NEUTROPHILS NFR BLD AUTO: 7.37 10*3/MM3 (ref 1.7–7)
NEUTROPHILS NFR BLD AUTO: 77.8 % (ref 42.7–76)
NRBC BLD AUTO-RTO: 0 /100 WBC (ref 0–0.2)
PHOSPHATE SERPL-MCNC: 5.9 MG/DL (ref 2.5–4.5)
PLATELET # BLD AUTO: 350 10*3/MM3 (ref 140–450)
PMV BLD AUTO: 9.6 FL (ref 6–12)
POTASSIUM SERPL-SCNC: 4.7 MMOL/L (ref 3.5–5.2)
PROT SERPL-MCNC: 7.1 G/DL (ref 6–8.5)
RBC # BLD AUTO: 3.5 10*6/MM3 (ref 3.77–5.28)
SODIUM SERPL-SCNC: 134 MMOL/L (ref 136–145)
URATE SERPL-MCNC: 11 MG/DL (ref 2.4–5.7)
WBC NRBC COR # BLD AUTO: 9.49 10*3/MM3 (ref 3.4–10.8)

## 2024-10-13 PROCEDURE — 93970 EXTREMITY STUDY: CPT

## 2024-10-13 PROCEDURE — 97530 THERAPEUTIC ACTIVITIES: CPT

## 2024-10-13 PROCEDURE — 63710000001 INSULIN LISPRO (HUMAN) PER 5 UNITS: Performed by: NURSE PRACTITIONER

## 2024-10-13 PROCEDURE — 97116 GAIT TRAINING THERAPY: CPT

## 2024-10-13 PROCEDURE — 97161 PT EVAL LOW COMPLEX 20 MIN: CPT

## 2024-10-13 PROCEDURE — 93970 EXTREMITY STUDY: CPT | Performed by: SURGERY

## 2024-10-13 PROCEDURE — 80053 COMPREHEN METABOLIC PANEL: CPT | Performed by: INTERNAL MEDICINE

## 2024-10-13 PROCEDURE — 25010000002 ENOXAPARIN PER 10 MG: Performed by: HOSPITALIST

## 2024-10-13 PROCEDURE — 63710000001 INSULIN GLARGINE PER 5 UNITS: Performed by: INTERNAL MEDICINE

## 2024-10-13 PROCEDURE — 82948 REAGENT STRIP/BLOOD GLUCOSE: CPT

## 2024-10-13 PROCEDURE — 25010000002 ERTAPENEM PER 500 MG: Performed by: HOSPITALIST

## 2024-10-13 PROCEDURE — 25010000002 MORPHINE PER 10 MG: Performed by: HOSPITALIST

## 2024-10-13 PROCEDURE — 84550 ASSAY OF BLOOD/URIC ACID: CPT | Performed by: INTERNAL MEDICINE

## 2024-10-13 PROCEDURE — 83735 ASSAY OF MAGNESIUM: CPT | Performed by: INTERNAL MEDICINE

## 2024-10-13 PROCEDURE — 25010000002 BUMETANIDE PER 0.5 MG: Performed by: INTERNAL MEDICINE

## 2024-10-13 PROCEDURE — 84100 ASSAY OF PHOSPHORUS: CPT | Performed by: INTERNAL MEDICINE

## 2024-10-13 PROCEDURE — 63710000001 INSULIN LISPRO (HUMAN) PER 5 UNITS: Performed by: HOSPITALIST

## 2024-10-13 PROCEDURE — 85025 COMPLETE CBC W/AUTO DIFF WBC: CPT | Performed by: INTERNAL MEDICINE

## 2024-10-13 RX ORDER — INSULIN LISPRO 100 [IU]/ML
10 INJECTION, SOLUTION INTRAVENOUS; SUBCUTANEOUS
Status: DISCONTINUED | OUTPATIENT
Start: 2024-10-13 | End: 2024-10-14

## 2024-10-13 RX ADMIN — ENOXAPARIN SODIUM 30 MG: 100 INJECTION SUBCUTANEOUS at 16:22

## 2024-10-13 RX ADMIN — PREGABALIN 100 MG: 75 CAPSULE ORAL at 08:00

## 2024-10-13 RX ADMIN — INSULIN LISPRO 2 UNITS: 100 INJECTION, SOLUTION INTRAVENOUS; SUBCUTANEOUS at 16:51

## 2024-10-13 RX ADMIN — HYDRALAZINE HYDROCHLORIDE 50 MG: 50 TABLET ORAL at 21:01

## 2024-10-13 RX ADMIN — MUPIROCIN 1 APPLICATION: 20 OINTMENT TOPICAL at 08:00

## 2024-10-13 RX ADMIN — Medication 10 ML: at 08:00

## 2024-10-13 RX ADMIN — INSULIN LISPRO 7 UNITS: 100 INJECTION, SOLUTION INTRAVENOUS; SUBCUTANEOUS at 11:31

## 2024-10-13 RX ADMIN — INSULIN LISPRO 10 UNITS: 100 INJECTION, SOLUTION INTRAVENOUS; SUBCUTANEOUS at 17:04

## 2024-10-13 RX ADMIN — MORPHINE SULFATE 2 MG: 2 INJECTION, SOLUTION INTRAMUSCULAR; INTRAVENOUS at 20:48

## 2024-10-13 RX ADMIN — PREGABALIN 100 MG: 75 CAPSULE ORAL at 20:48

## 2024-10-13 RX ADMIN — INSULIN GLARGINE 40 UNITS: 100 INJECTION, SOLUTION SUBCUTANEOUS at 08:00

## 2024-10-13 RX ADMIN — FERROUS SULFATE TAB 325 MG (65 MG ELEMENTAL FE) 325 MG: 325 (65 FE) TAB at 08:01

## 2024-10-13 RX ADMIN — INSULIN LISPRO 4 UNITS: 100 INJECTION, SOLUTION INTRAVENOUS; SUBCUTANEOUS at 07:49

## 2024-10-13 RX ADMIN — LIDOCAINE 1 APPLICATION: 4 CREAM TOPICAL at 08:01

## 2024-10-13 RX ADMIN — ERTAPENEM SODIUM 500 MG: 1 INJECTION, POWDER, LYOPHILIZED, FOR SOLUTION INTRAMUSCULAR; INTRAVENOUS at 14:12

## 2024-10-13 RX ADMIN — PANTOPRAZOLE SODIUM 40 MG: 40 TABLET, DELAYED RELEASE ORAL at 05:51

## 2024-10-13 RX ADMIN — SODIUM BICARBONATE 1300 MG: 650 TABLET ORAL at 08:01

## 2024-10-13 RX ADMIN — Medication 500 MG: at 08:01

## 2024-10-13 RX ADMIN — BUMETANIDE 4 MG: 0.25 INJECTION INTRAMUSCULAR; INTRAVENOUS at 04:16

## 2024-10-13 RX ADMIN — Medication 500 MG: at 20:48

## 2024-10-13 RX ADMIN — SODIUM BICARBONATE 1300 MG: 650 TABLET ORAL at 20:48

## 2024-10-13 RX ADMIN — HYDROCODONE BITARTRATE AND ACETAMINOPHEN 1 TABLET: 10; 325 TABLET ORAL at 04:22

## 2024-10-13 RX ADMIN — DULOXETINE HYDROCHLORIDE 30 MG: 30 CAPSULE, DELAYED RELEASE ORAL at 08:01

## 2024-10-13 RX ADMIN — SODIUM BICARBONATE 1300 MG: 650 TABLET ORAL at 16:22

## 2024-10-13 RX ADMIN — MORPHINE SULFATE 2 MG: 2 INJECTION, SOLUTION INTRAMUSCULAR; INTRAVENOUS at 07:59

## 2024-10-13 RX ADMIN — HYDROCODONE BITARTRATE AND ACETAMINOPHEN 1 TABLET: 10; 325 TABLET ORAL at 20:48

## 2024-10-13 RX ADMIN — HYDRALAZINE HYDROCHLORIDE 50 MG: 50 TABLET ORAL at 05:51

## 2024-10-13 RX ADMIN — MORPHINE SULFATE 2 MG: 2 INJECTION, SOLUTION INTRAMUSCULAR; INTRAVENOUS at 14:12

## 2024-10-13 RX ADMIN — ATORVASTATIN CALCIUM 80 MG: 80 TABLET, FILM COATED ORAL at 20:58

## 2024-10-13 RX ADMIN — INSULIN LISPRO 2 UNITS: 100 INJECTION, SOLUTION INTRAVENOUS; SUBCUTANEOUS at 11:32

## 2024-10-13 RX ADMIN — INSULIN LISPRO 7 UNITS: 100 INJECTION, SOLUTION INTRAVENOUS; SUBCUTANEOUS at 07:49

## 2024-10-13 RX ADMIN — TERAZOSIN 1 MG: 1 CAPSULE ORAL at 20:48

## 2024-10-13 RX ADMIN — LEVOTHYROXINE SODIUM 137 MCG: 137 TABLET ORAL at 05:51

## 2024-10-13 RX ADMIN — BISOPROLOL FUMARATE 5 MG: 5 TABLET, FILM COATED ORAL at 08:04

## 2024-10-13 NOTE — PROGRESS NOTES
[facility]     LOS: 2 days     Subjective:    CC:Hyperglycemia    DIET:  Diet Order   Procedures    Diet: Cardiac, Diabetic; Healthy Heart (2-3 Na+); Consistent Carbohydrate; Fluid Consistency: Thin (IDDSI 0)   Feeling better, leg swelling is better    Objective:    Vital Signs:  Temp:  [97.5 °F (36.4 °C)-98 °F (36.7 °C)] 97.8 °F (36.6 °C)  Heart Rate:  [54-71] 61  Resp:  [13-16] 13  BP: (100-184)/(42-84) 100/42    SpO2:  [92 %-97 %] 94 %  on   ;   Device (Oxygen Therapy): room air  Body mass index is 32.83 kg/m².    Physical Exam  HENT:      Head: Normocephalic and atraumatic.   Cardiovascular:      Heart sounds: No murmur heard.     No friction rub.   Pulmonary:      Effort: Pulmonary effort is normal.      Breath sounds: Normal breath sounds.   Abdominal:      General: Bowel sounds are normal. There is no distension.      Palpations: Abdomen is soft.      Tenderness: There is no abdominal tenderness.   Musculoskeletal:      Comments: Lower extremities extremely sensitive to touch, mild erythema, minimal warmth of the legs   Skin:     General: Skin is warm and dry.         Results Review:    Glucose   Date Value Ref Range Status   10/13/2024 234 (H) 65 - 99 mg/dL Final   10/12/2024 256 (H) 65 - 99 mg/dL Final   10/11/2024 291 (H) 65 - 99 mg/dL Final   10/11/2024 269 (H) 65 - 99 mg/dL Final     Results from last 7 days   Lab Units 10/13/24  0416   WBC 10*3/mm3 9.49   HEMOGLOBIN g/dL 9.6*   HEMATOCRIT % 30.2*   PLATELETS 10*3/mm3 350     Results from last 7 days   Lab Units 10/13/24  0416   SODIUM mmol/L 134*   POTASSIUM mmol/L 4.7   CHLORIDE mmol/L 99   CO2 mmol/L 20.4*   BUN mg/dL 72*   CREATININE mg/dL 2.37*   CALCIUM mg/dL 8.5*   BILIRUBIN mg/dL <0.2   ALK PHOS U/L 131*   ALT (SGPT) U/L 5   AST (SGOT) U/L 8   GLUCOSE mg/dL 234*         Results from last 7 days   Lab Units 10/13/24  0416   MAGNESIUM mg/dL 1.6         Cultures:  Urine Culture   Date Value Ref Range Status   10/10/2024  >100,000 CFU/mL Klebsiella pneumoniae ESBL (A)  Preliminary       I have reviewed daily medications and changes in CPOE    Scheduled meds  ammonium lactate, 1 Application, Topical, BID  atorvastatin, 80 mg, Oral, Nightly  bisoprolol, 5 mg, Oral, Daily  DULoxetine, 30 mg, Oral, Daily  enoxaparin, 30 mg, Subcutaneous, Q24H  ertapenem, 500 mg, Intravenous, Q24H  ferrous sulfate, 325 mg, Oral, Every Other Day  hydrALAZINE, 50 mg, Oral, Q8H  [START ON 10/14/2024] insulin glargine, 43 Units, Subcutaneous, Daily  insulin lispro, 10 Units, Subcutaneous, TID With Meals  insulin lispro, 2-9 Units, Subcutaneous, 4x Daily AC & at Bedtime  levothyroxine, 137 mcg, Oral, Q AM  Menthol-Zinc Oxide, 1 Application, Topical, Q12H  mupirocin, 1 Application, Each Nare, BID  pantoprazole, 40 mg, Oral, Q AM  pregabalin, 100 mg, Oral, BID  saccharomyces boulardii, 500 mg, Oral, BID  sodium bicarbonate, 1,300 mg, Oral, TID  terazosin, 1 mg, Oral, Nightly             PRN meds    acetaminophen **OR** acetaminophen **OR** acetaminophen    senna-docusate sodium **AND** polyethylene glycol **AND** bisacodyl **AND** bisacodyl    dextrose    dextrose    glucagon (human recombinant)    hydrALAZINE    HYDROcodone-acetaminophen    melatonin    Morphine    ondansetron ODT **OR** ondansetron    [COMPLETED] Insert Peripheral IV **AND** sodium chloride        CKD (chronic kidney disease) stage 4, GFR 15-29 ml/min    Type 2 diabetes mellitus with hyperglycemia, with long-term current use of insulin    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 female admitted  with hyperglycemia  -Elevated blood sugars above 500 on admission blood sugars are  better  -Acidosis is improving  -Patient on Lantus 40 units and will add mealtime and sliding scale insulin  -Tolerating oral intake     Type II diabetes mellitus  With chronic use of insulin  Complicated by Autonomic neuropathy  Diabetic foot ulcer- History of   -As above  -Elevated A1c     Metabolic Acidosis   -Bicarbonate improved today to 18     CKD chronic disease stage IV  VIPUL ATN  -Nephrology consultation  -Creatinine improved     Anemia chronic kidney disease     Urinary tract infection  -ESBL E. coli in urine, changed to Invanz and ask for opinion from ID     Bilateral lower extremity edema  -Legs are stable     Hypothyroidism  Continue levothyroxine     HFpEF  -IV Bumex            Nolan Dasilva MD  10/13/24  15:27 EDT

## 2024-10-13 NOTE — PROGRESS NOTES
[facility] Progress Note      Patient Name: Halie Guidry  : 1940  MRN: [id number]  Primary Care Physician:  Napoleon Mead MD  Date of admission: 10/10/2024    Subjective     Interval History:   Follow-up acute kidney injury on chronic kidney    Patient is feeling better, continue to have significant discomfort of her lower extremities, no chest pain or shortness of air, no orthopnea or PND, no nausea or vomiting.    Review of Systems:   As noted above    Objective     Vitals:   Temp:  [97.5 °F (36.4 °C)-98 °F (36.7 °C)] 97.5 °F (36.4 °C)  Heart Rate:  [51-71] 71  Resp:  [11-16] 16  BP: (134-184)/(56-84) 159/72    Intake/Output Summary (Last 24 hours) at 10/13/2024 1048  Last data filed at 10/13/2024 0759  Gross per 24 hour   Intake 1210 ml   Output 1650 ml   Net -440 ml       Physical Exam:    General Appearance: alert, awake, chronically ill, no acute distress   Skin: warm and dry  HEENT: oral mucosa normal, nonicteric sclera  Neck: No JVD  Lungs: Clear to auscultation, unlabored breathing effort  Heart: RRR, no rub  Abdomen: soft, nontender, nondistended  : no palpable bladder  Extremities: Brawny BLE edema with warmth and erythema, presumed ulcerations to bilateral heels (covered with dressing), no clubbing or cyanosis   Neuro: normal speech and mental status     Scheduled Meds:     ammonium lactate, 1 Application, Topical, BID  atorvastatin, 80 mg, Oral, Nightly  bisoprolol, 5 mg, Oral, Daily  DULoxetine, 30 mg, Oral, Daily  enoxaparin, 30 mg, Subcutaneous, Q24H  ertapenem, 500 mg, Intravenous, Q24H  ferrous sulfate, 325 mg, Oral, Every Other Day  hydrALAZINE, 50 mg, Oral, Q8H  insulin glargine, 40 Units, Subcutaneous, Daily  insulin lispro, 2-9 Units, Subcutaneous, 4x Daily AC & at Bedtime  insulin lispro, 7 Units, Subcutaneous, TID With Meals  levothyroxine, 137 mcg, Oral, Q AM  Menthol-Zinc Oxide, 1 Application, Topical, Q12H  mupirocin, 1 Application, Each  Nare, BID  pantoprazole, 40 mg, Oral, Q AM  pregabalin, 100 mg, Oral, BID  saccharomyces boulardii, 500 mg, Oral, BID  sodium bicarbonate, 1,300 mg, Oral, TID  terazosin, 1 mg, Oral, Nightly      IV Meds:        Results Reviewed:   I have personally reviewed the results from the time of this admission to 10/13/2024 10:48 EDT     Results from last 7 days   Lab Units 10/13/24  0416 10/12/24  0550 10/11/24  1151 10/11/24  0455 10/10/24  1220   SODIUM mmol/L 134* 136 133*   < > 130*   POTASSIUM mmol/L 4.7 4.6 4.6   < > 5.2   CHLORIDE mmol/L 99 107 100   < > 99   CO2 mmol/L 20.4* 18.3* 16.9*   < > 18.0*   BUN mg/dL 72* 68* 74*   < > 83*   CREATININE mg/dL 2.37* 2.52* 2.47*   < > 2.91*   CALCIUM mg/dL 8.5* 8.4* 8.8   < > 8.7   BILIRUBIN mg/dL <0.2 0.2  --   --  0.3   ALK PHOS U/L 131* 131*  --   --  157*   ALT (SGPT) U/L 5 <5  --   --  7   AST (SGOT) U/L 8 8  --   --  10   GLUCOSE mg/dL 234* 256* 291*   < > 550*    < > = values in this interval not displayed.       Estimated Creatinine Clearance: 21.6 mL/min (A) (by C-G formula based on SCr of 2.37 mg/dL (H)).    Results from last 7 days   Lab Units 10/13/24  0416   MAGNESIUM mg/dL 1.6   PHOSPHORUS mg/dL 5.9*       Results from last 7 days   Lab Units 10/13/24  0416   URIC ACID mg/dL 11.0*       Results from last 7 days   Lab Units 10/13/24  0416 10/12/24  0550 10/11/24  0455 10/10/24  1220 10/08/24  1401   WBC 10*3/mm3 9.49 9.30 11.27* 9.10 10.55   HEMOGLOBIN g/dL 9.6* 9.4* 8.6* 9.4* 10.3*   PLATELETS 10*3/mm3 350 321 350 348 442             Assessment / Plan     ASSESSMENT:  Acute kidney injury vs. Progression of chronic kidney disease stage IV- Patient creatinine up until April of this year was previously running ~1.5 +/- but unfortunately after her hospitalization at that time for VIPUL and hyperglycemia her kidney function did not recover to baseline. For the last two months her creatinine has been running 2.2-2.6 mg/dl. Etiology is likely progression of her kidney  disease secondary to poorly controlled diabetes. Creatinine today 2.37 mg/dl which is relatively stable compared to the last two months. Co2 20.4. Electrolytes are within acceptable range today. She appears relatively euvolemic.   Hyperkalemia- Resolved. Potassium on admission 5.4. Improved with holding outpatient spironolactone and lisinopril as well as glucose correction. Potassium 4.6 today.   Hyponatremia- Resolved. This was a pseudo-hyponatremia secondary to her significantly elevated blood glucose. Sodium today 134.   Type II diabetes with renal complications- Poorly controlled. Hgb A1c 10.3 on admission. Patient states she lives in assisted living but does not receive medication assistance. She reports very poor understanding of her disease management. Will defer treatment and education to primary but did explain that this is likely the cause of her kidney disease progression and stressed the importance of getting her glucose under control.   Metabolic acidosis- Co2 2.4 today.  On oral sodium bicarb  Bilateral lower extremity cellulitis- Treatment per primary team  UTI- U treated.  History of hypertension-blood pressure is reasonably controlled  Anemia in chronic kidney disease- Hgb today 9.6 g/dl, improving. Unfortunately while she is being treated for active infection she would not benefit from IV iron replacement or KHUSHBU.    PLAN:  Continue the same treatment  Surveillance labs    I reviewed the chart and other providers notes, reviewed labs.  Discussed the case with the patient and she voiced good understanding  Copied text in this note has been reviewed and is accurate as of 10/13/24.       Thank you for involving us in the care of Halie Guidry.  Please feel free to call with any questions.    Chidi Lanier MD  10/13/24  10:48 EDT    [facility]  [phone]    Please note that portions of this note were completed with a voice recognition program.

## 2024-10-13 NOTE — PLAN OF CARE
Pt alert and oriented times 4. Pt continuing to complain of bilateral leg pain today, prn pain medication given and amlactin cream applied. Pt went down for lower duplex venous today. Pt tolerating diet. Pt blood sugars improving. Pt educated and updated on plan of care.

## 2024-10-13 NOTE — PLAN OF CARE
Problem: Adult Inpatient Plan of Care  Goal: Plan of Care Review  Outcome: Progressing     Problem: Fall Injury Risk  Goal: Absence of Fall and Fall-Related Injury  Outcome: Progressing  Intervention: Identify and Manage Contributors  Recent Flowsheet Documentation  Taken 10/13/2024 0200 by Fouzia Villalta RN  Medication Review/Management: medications reviewed  Taken 10/13/2024 0000 by Fouzia Villalta RN  Medication Review/Management: medications reviewed  Taken 10/12/2024 2200 by Fouzia Villalta RN  Medication Review/Management: medications reviewed  Taken 10/12/2024 2124 by Fouzia Villalta RN  Medication Review/Management: medications reviewed  Taken 10/12/2024 2000 by Fouzia Villalta RN  Medication Review/Management: medications reviewed  Intervention: Promote Injury-Free Environment  Recent Flowsheet Documentation  Taken 10/13/2024 0200 by Fouzia Villalta RN  Safety Promotion/Fall Prevention:   activity supervised   lighting adjusted   nonskid shoes/slippers when out of bed   safety round/check completed   room organization consistent  Taken 10/13/2024 0000 by Fouzia Villalta RN  Safety Promotion/Fall Prevention:   activity supervised   lighting adjusted   nonskid shoes/slippers when out of bed   safety round/check completed   room organization consistent  Taken 10/12/2024 2200 by Fouzia Villalta RN  Safety Promotion/Fall Prevention:   activity supervised   lighting adjusted   nonskid shoes/slippers when out of bed   safety round/check completed   room organization consistent  Taken 10/12/2024 2124 by Fouzia Villalta RN  Safety Promotion/Fall Prevention:   activity supervised   lighting adjusted   nonskid shoes/slippers when out of bed   safety round/check completed   room organization consistent  Taken 10/12/2024 2000 by Fouzia Villalta RN  Safety Promotion/Fall Prevention:   activity supervised   lighting adjusted   nonskid shoes/slippers when out of bed   safety round/check completed   room  organization consistent   Goal Outcome Evaluation:

## 2024-10-13 NOTE — THERAPY EVALUATION
Acute Care - Physical Therapy Initial Evaluation  [facility]     Patient Name: Halie Guidry  : 1940  MRN: [id number]  Today's Date: 10/13/2024   Onset of Illness/Injury or Date of Surgery: 10/10/24  Visit Dx:     ICD-10-CM ICD-9-CM   1. Acute UTI  N39.0 599.0   2. Type 2 diabetes mellitus with hyperglycemia, with long-term current use of insulin  E11.65 250.00    Z79.4 790.29     V58.67   3. Poorly controlled diabetes mellitus  E11.65 250.00   4. Venous stasis dermatitis of both lower extremities  I87.2 454.1   5. Chronic renal impairment, unspecified CKD stage  N18.9 585.9   6. Decreased mobility and endurance  Z74.09 780.99     Patient Active Problem List   Diagnosis    Compression fracture    Lumbar degenerative disc disease    Type 2 diabetes mellitus with hyperglycemia, with long-term current use of insulin    Hyperlipidemia    Benign essential hypertension    Primary hypothyroidism    Neuropathy    Osteoporosis    Proteinuria    Tobacco abuse    Generalized weakness    Spinal stenosis    Scoliosis    Arthritis    VBI (vertebrobasilar insufficiency)    Orthostatic hypotension    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    Severe hypothyroidism    Noncompliance with medication regimen    Vitamin D deficiency disease    Tremor    Seizure    Thoracic degenerative disc disease    Acute kidney injury (VIPUL) with acute tubular necrosis (ATN)    Hyperglycemia    Type II diabetes mellitus, uncontrolled    Lower abdominal pain    Transaminitis    Emphysematous cystitis    Choledocholithiasis    Hypokalemia    Hypoxia    Generalized abdominal pain    History of Clostridium difficile infection    History of ERCP    Hypomagnesemia    Anxiety disorder    Neuropathic pain    Intertrigo    Weakness of both lower extremities    Accelerated hypertension    Acute cystitis without hematuria    Left lower lobe pneumonia    Cellulitis and abscess of left lower extremity    Benign hypertension with  CKD (chronic kidney disease) stage IV    Peripheral edema    Primary malignant neuroendocrine tumor of pancreas    Encounter for long-term (current) use of high-risk medication    Diabetic foot ulcer    Stage 3a chronic kidney disease    Pressure injury of buttock, stage 2    HTN (hypertension)    Anemia due to chronic kidney disease    Bilateral lower extremity edema    Cellulitis of right lower extremity    CKD (chronic kidney disease) stage 4, GFR 15-29 ml/min    Acute CHF    Bilateral cellulitis of lower leg    Acute UTI    UTI (urinary tract infection)    Acute UTI (urinary tract infection)    Metabolic acidosis    Cobalamin deficiency    Dependent edema    Gastroesophageal reflux disease    Mild neurocognitive disorder    Pancreatic neoplasm    Congestive heart failure     Past Medical History:   Diagnosis Date    Acute metabolic encephalopathy 06/24/2022    Anxiety     Arthritis     Benign essential hypertension 08/20/2014    Bleeding disorder     Depression     Diabetes     Diabetes mellitus, type 2     Disc degeneration, lumbar     Headache, tension-type     Hyperlipidemia     Hypothyroidism     Neuropathy     Osteoporosis 09/09/2015    Pancreatic mass     Sept 2023, on Monthly Octreotide Injection    Peripheral neuropathy     Rotator cuff tear, left     Scoliosis     Shoulder pain     LEFT, TORN ROTATOR CUFF S/P FALL    Spinal stenosis      Past Surgical History:   Procedure Laterality Date    APPENDECTOMY      BILATERAL BREAST REDUCTION Bilateral 08/2015    CATARACT EXTRACTION  03/2015    CHOLECYSTECTOMY WITH INTRAOPERATIVE CHOLANGIOGRAM N/A 3/27/2022    Procedure: CHOLECYSTECTOMY LAPAROSCOPIC INTRAOPERATIVE CHOLANGIOGRAM;  Surgeon: Aiyana Hill MD;  Location: [facility];  Service: General;  Laterality: N/A;    COLONOSCOPY  06/05/2015    WNL    ERCP N/A 2/25/2022    Procedure: ENDOSCOPIC RETROGRADE CHOLANGIOPANCREATOGRAPHY with sphincterotomy and balloon sweep;  Surgeon: Chilo Wilhelm MD;   "Location: [facility] ENDOSCOPY;  Service: Gastroenterology;  Laterality: N/A;  PRE/POST - CBD stones    ERCP N/A 3/28/2022    Procedure: ENDOSCOPIC RETROGRADE CHOLANGIOPANCREATOGRAPHY WITH SPHINCTEROTOMY AND BALLOON SWEEP;  Surgeon: Chilo Wilhelm MD;  Location: [facility] ENDOSCOPY;  Service: Gastroenterology;  Laterality: N/A;  PRE: COMMON DUCT STONE  POST: COMMON DUCT STONE    KYPHOPLASTY      REDUCTION MAMMAPLASTY      TONSILLECTOMY       PT Assessment (Last 12 Hours)       PT Evaluation and Treatment       Row Name 10/13/24 1455          Physical Therapy Time and Intention    Subjective Information complains of;pain;weakness  Says that her lower legs hurt and has cramps \"all over\" specifically in her groin and hands.  -JR     Document Type evaluation  -JR     Mode of Treatment physical therapy  -JR     Patient Effort good  -JR       Row Name 10/13/24 5796          General Information    Patient Profile Reviewed yes  -JR     Onset of Illness/Injury or Date of Surgery 10/10/24  -JR     Referring Physician Meri Koch NP  -JR     Patient Observations alert;cooperative;agree to therapy  -JR     General Observations of Patient Supine in bed in no acute distress.  -JR     Prior Level of Function independent:  -JR     Equipment Currently Used at Home walker, rolling  -JR     Pertinent History of Current Functional Problem CKD, DM, L rotator cuff tear, UTI, vascular dematitis in lower legs.  -JR     Existing Precautions/Restrictions oxygen therapy device and L/min  -JR     Risks Reviewed patient:;LOB;nausea/vomiting;dizziness;increased discomfort;change in vital signs;increased drainage;lines disloged  -JR     Benefits Reviewed patient:;improve function;increase independence;increase strength;increase balance;decrease pain;decrease risk of DVT;improve skin integrity;increase knowledge  -JR     Barriers to Rehab none identified  -JR       Row Name 10/13/24 5370          Living Environment    Current Living " Arrangements assisted living facility  -JR     People in Home facility resident  -       Row Name 10/13/24 1452          Pain    Pretreatment Pain Rating 5/10  -JR     Posttreatment Pain Rating 5/10  -JR     Pain Side/Orientation bilateral;posterior  Lower legs  -JR     Pain Management Interventions exercise or physical activity utilized  -     Response to Pain Interventions no change per patient report  -       Row Name 10/13/24 1452          Cognition    Orientation Status (Cognition) oriented x 3  -       Row Name 10/13/24 1452          Range of Motion Comprehensive    Comment, General Range of Motion L shoulder AROM limited by 50%, WFLs in R UE and rest of L UE.  Bilateral LEs is WFLs  -       Row Name 10/13/24 1452          Strength Comprehensive (MMT)    Comment, General Manual Muscle Testing (MMT) Assessment Grossly 3/5 throughout UEs, grossly 3-/5 in bilateral LEs.  -       Row Name 10/13/24 1452          Bed Mobility    Bed Mobility bed mobility (all) activities;supine-sit;sit-supine;scooting/bridging  -JR     Scooting/Bridging [place] (Bed Mobility) contact guard  -     Supine-Sit [place] (Bed Mobility) minimum assist (75% patient effort)  -JR     Sit-Supine [place] (Bed Mobility) minimum assist (75% patient effort);moderate assist (50% patient effort)  Assist for legs for placement in bed.  -JR     Bed Mobility, Safety Issues decreased use of arms for pushing/pulling;decreased use of legs for bridging/pushing  -     Comment, (Bed Mobility) Patient was able to pull and scoot up in bed in order to position herself.  -       Row Name 10/13/24 1452          Transfers    Transfers sit-stand transfer;stand-sit transfer  -       Row Name 10/13/24 1452          Sit-Stand Transfer    Sit-Stand [place] (Transfers) moderate assist (50% patient effort);maximum assist (25% patient effort);1 person assist  -     Assistive Device (Sit-Stand Transfers) walker, 4-wheeled  -        Row Name 10/13/24 1452          Stand-Sit Transfer    Stand-Sit [place] (Transfers) moderate assist (50% patient effort);maximum assist (25% patient effort)  -JR     Assistive Device (Stand-Sit Transfers) walker, 4-wheeled  -JR     Comment, (Stand-Sit Transfer) Attempted to stand but was too weak to get to full standing.  Began to have cramps in R groin and wanted to sit back down.  -JR       Row Name 10/13/24 1452          Gait/Stairs (Locomotion)    Comment, (Gait/Stairs) NT  -JR       Row Name 10/13/24 1452          Safety Issues/Impairments Affecting Functional Mobility    Impairments Affecting Function (Mobility) balance;endurance/activity tolerance;strength  -JR       Row Name 10/13/24 1452          Balance    Balance Assessment sitting dynamic balance;sit to stand dynamic balance;standing static balance;standing dynamic balance  -JR     Dynamic Sitting Balance contact guard  -JR     Position, Sitting Balance sitting edge of bed  -JR     Sit to Stand Dynamic Balance maximum assist  -JR     Position/Device Used, Standing Balance walker, front-wheeled  -JR     Balance Interventions sitting  -JR     Comment, Balance Sat on edge of bed with CGA.  Performed LAQs and marching in place with each LE.  -JR       Row Name             Wound 10/10/24 1400 Bilateral lateral coccyx    Wound - Properties Group Placement Date: 10/10/24  -DT Placement Time: 1400  -DT Side: Bilateral  -DT Orientation: lateral  -DT Location: coccyx  -DT    Retired Wound - Properties Group Placement Date: 10/10/24  -DT Placement Time: 1400  -DT Side: Bilateral  -DT Orientation: lateral  -DT Location: coccyx  -DT    Retired Wound - Properties Group Placement Date: 10/10/24  -DT Placement Time: 1400  -DT Side: Bilateral  -DT Orientation: lateral  -DT Location: coccyx  -DT    Retired Wound - Properties Group Date first assessed: 10/10/24  -DT Time first assessed: 1400  -DT Side: Bilateral  -DT Location: coccyx  -DT      Row Name              Wound 08/24/24 2208 Left lower leg Diabetic Ulcer    Wound - Properties Group Placement Date: 08/24/24  -MS Placement Time: 2208  -MS Side: Left  -MS Orientation: lower  -MS Location: leg  -MS Primary Wound Type: Diabetic ulc  -MS Present on Original Admission: Y  -MS    Retired Wound - Properties Group Placement Date: 08/24/24  -MS Placement Time: 2208  -MS Present on Original Admission: Y  -MS Side: Left  -MS Orientation: lower  -MS Location: leg  -MS Primary Wound Type: Diabetic ulc  -MS    Retired Wound - Properties Group Placement Date: 08/24/24  -MS Placement Time: 2208  -MS Present on Original Admission: Y  -MS Side: Left  -MS Orientation: lower  -MS Location: leg  -MS Primary Wound Type: Diabetic ulc  -MS    Retired Wound - Properties Group Date first assessed: 08/24/24  -MS Time first assessed: 2208  -MS Present on Original Admission: Y  -MS Side: Left  -MS Location: leg  -MS Primary Wound Type: Diabetic ulc  -MS      Row Name             Wound 08/24/24 2208 Right lower leg Diabetic Ulcer    Wound - Properties Group Placement Date: 08/24/24  -MS Placement Time: 2208  -MS Side: Right  -MS Orientation: lower  -MS Location: leg  -MS Primary Wound Type: Diabetic ulc  -MS    Retired Wound - Properties Group Placement Date: 08/24/24  -MS Placement Time: 2208  -MS Side: Right  -MS Orientation: lower  -MS Location: leg  -MS Primary Wound Type: Diabetic ulc  -MS    Retired Wound - Properties Group Placement Date: 08/24/24  -MS Placement Time: 2208  -MS Side: Right  -MS Orientation: lower  -MS Location: leg  -MS Primary Wound Type: Diabetic ulc  -MS    Retired Wound - Properties Group Date first assessed: 08/24/24  -MS Time first assessed: 2208  -MS Side: Right  -MS Location: leg  -MS Primary Wound Type: Diabetic ulc  -MS      Row Name 10/13/24 1452          Plan of Care Review    Plan of Care Reviewed With patient  -JR     Progress improving  -JR       Row Name 10/13/24 1452          Vital Signs    Pre Systolic  BP Rehab 100  -JR     Pre Treatment Diastolic BP 42  -JR     Pretreatment Heart Rate (beats/min) 61  -JR     Posttreatment Heart Rate (beats/min) 63  -JR     Pre SpO2 (%) 94  -JR     Post SpO2 (%) 96  -       Row Name 10/13/24 1452          Positioning and Restraints    Pre-Treatment Position in bed  -JR     Post Treatment Position bed  -JR     In Bed notified nsg;supine;call light within reach;encouraged to call for assist;exit alarm on;with nsg;RUE elevated;LUE elevated  -       Row Name 10/13/24 1458          Therapy Assessment/Plan (PT)    Patient/Family Therapy Goals Statement (PT) Go home  -JR     Functional Level at Time of Evaluation (PT) min assist for bed mobility, max assist for standing.  -JR     PT Diagnosis (PT) decreased mobility and endurance  -     Rehab Potential (PT) good  -     Criteria for Skilled Interventions Met (PT) yes  -JR     Therapy Frequency (PT) 6 times/wk  -JR     Predicted Duration of Therapy Intervention (PT) 3-4 weeks  -     Problem List (PT) balance;problems related to;mobility;strength  -       Row Name 10/13/24 1821          Therapy Plan Review/Discharge Plan (PT)    Therapy Plan Review (PT) evaluation/treatment results reviewed;care plan/treatment goals reviewed;risks/benefits reviewed;current/potential barriers reviewed;participants voiced agreement with care plan;participants included;patient  -       Row Name 10/13/24 5591          Physical Therapy Goals    Bed Mobility Goal Selection (PT) bed mobility, PT goal 1  -     Transfer Goal Selection (PT) transfer, PT goal 1  -     Gait Training Goal Selection (PT) gait training, PT goal 1  -       Row Name 10/13/24 1456          Bed Mobility Goal 1 (PT)    Activity/Assistive Device (Bed Mobility Goal 1, PT) bed mobility activities, all  -JR     [place] Level/Cues Needed (Bed Mobility Goal 1, PT) standby assist  -     Time Frame (Bed Mobility Goal 1, PT) 1 week  -       Row Name 10/13/24 6911           Transfer Goal 1 (PT)    Activity/Assistive Device (Transfer Goal 1, PT) transfers, all;walker, rolling  -JR     [place] Level/Cues Needed (Transfer Goal 1, PT) standby assist  -JR     Time Frame (Transfer Goal 1, PT) 1 week  -JR       Row Name 10/13/24 1452          Gait Training Goal 1 (PT)    Activity/Assistive Device (Gait Training Goal 1, PT) gait (walking locomotion);assistive device use;forward stepping;improve balance and speed;increase endurance/gait distance;walker, rolling  -JR     [place] Level (Gait Training Goal 1, PT) standby assist  -JR     Distance (Gait Training Goal 1, PT) 100  -JR     Time Frame (Gait Training Goal 1, PT) 2 weeks  -JR               User Key  (r) = Recorded By, (t) = Taken By, (c) = Cosigned By      Initials Name Provider Type    Kelsey Laboy, RN Registered Nurse    JR Ulisses Warren PT Physical Therapist    Dhiraj Cotto RN Registered Nurse                    Physical Therapy Education       Title: PT OT SLP Therapies (In Progress)       Topic: Physical Therapy (In Progress)       Point: Mobility training (In Progress)       Learning Progress Summary            Patient Acceptance, E,D, NR by  at 10/13/2024 1505    Acceptance, E, VU by MR at 10/12/2024 1831                      Point: Home exercise program (In Progress)       Learning Progress Summary            Patient Acceptance, E,D, NR by  at 10/13/2024 1505    Acceptance, E, VU by MR at 10/12/2024 1831                      Point: Body mechanics (In Progress)       Learning Progress Summary            Patient Acceptance, E,D, NR by  at 10/13/2024 1505    Acceptance, E, VU by MR at 10/12/2024 1831                      Point: Precautions (In Progress)       Learning Progress Summary            Patient Acceptance, E,D, NR by JR at 10/13/2024 1505    Acceptance, E, VU by MR at 10/12/2024 1831                                      User Key       Initials Effective Dates Name Provider Type Discipline     JR 06/16/21 -  Ulisses Warren, PT Physical Therapist PT    MR 08/03/23 -  Clarisse Chahal, RN Registered Nurse Nurse                  PT Recommendation and Plan  Anticipated Discharge Disposition (PT): assisted living  Planned Therapy Interventions (PT): balance training, bed mobility training, gait training, strengthening, transfer training  Therapy Frequency (PT): 6 times/wk  Plan of Care Reviewed With: patient  Progress: improving  Outcome Evaluation: Patient will need PT to address her strength and deconditioning.  She was able to ambulate short distances at the facility where she resides, but she is too weak to come to full standing right now.  She is motivated to work with PT and did attempt to stand today with her rolling walker.  She also sat on the edge of the bed for several minutes with SBA and performed therapeutic exercises.  PT will continue to progress the patient to standing balance followed with ambulation as appropriate.   Outcome Measures       Row Name 10/13/24 1507             How much help from another person do you currently need...    Turning from your back to your side while in flat bed without using bedrails? 3  -JR      Moving from lying on back to sitting on the side of a flat bed without bedrails? 3  -JR      Moving to and from a bed to a chair (including a wheelchair)? 1  -JR      Standing up from a chair using your arms (e.g., wheelchair, bedside chair)? 1  -JR      Climbing 3-5 steps with a railing? 1  -JR      To walk in hospital room? 1  -JR      AM-PAC 6 Clicks Score (PT) 10  -JR                User Key  (r) = Recorded By, (t) = Taken By, (c) = Cosigned By      Initials Name Provider Type    Ulisses Lew, PT Physical Therapist                     Time Calculation:    PT Charges       Row Name 10/13/24 150             Time Calculation    Start Time 1330  -JR      Stop Time 1355  -JR      Time Calculation (min) 25 min  -JR      PT Received On 10/13/24  -JR      PT - Next  Appointment 10/14/24  -      PT Goal Re-Cert Due Date 10/20/24  -                User Key  (r) = Recorded By, (t) = Taken By, (c) = Cosigned By      Initials Name Provider Type    Ulisses Lew, PT Physical Therapist                  Therapy Charges for Today       Code Description Service Date Service Provider Modifiers Qty    20619567854 HC PT EVAL LOW COMPLEXITY 2 10/13/2024 Ulisses Warren, PT GP 1    70456128189 HC GAIT TRAINING EA 15 MIN 10/13/2024 Ulisses Warren, PT GP 1    [id number]  PT THERAPEUTIC ACT EA 15 MIN 10/13/2024 Ulisses Warren, PT GP 1            PT G-Codes  AM-PAC 6 Clicks Score (PT): 10    Ulisses Warren, PT  10/13/2024

## 2024-10-13 NOTE — PLAN OF CARE
Goal Outcome Evaluation:  Plan of Care Reviewed With: patient  Plan of Care Reviewed With: patient        Progress: improving  Outcome Evaluation: Patient will need PT to address her strength and deconditioning.  She was able to ambulate short distances at the facility where she resides, but she is too weak to come to full standing right now.  She is motivated to work with PT and did attempt to stand today with her rolling walker.  She also sat on the edge of the bed for several minutes with SBA and performed therapeutic exercises.  PT will continue to progress the patient to standing balance followed with ambulation as appropriate.    Anticipated Discharge Disposition (PT): assisted living

## 2024-10-14 LAB
ALBUMIN SERPL-MCNC: 3.5 G/DL (ref 3.5–5.2)
ANION GAP SERPL CALCULATED.3IONS-SCNC: 15.3 MMOL/L (ref 5–15)
ANION GAP SERPL CALCULATED.3IONS-SCNC: 18.3 MMOL/L (ref 5–15)
BASOPHILS # BLD AUTO: 0.07 10*3/MM3 (ref 0–0.2)
BASOPHILS NFR BLD AUTO: 0.8 % (ref 0–1.5)
BUN SERPL-MCNC: 83 MG/DL (ref 8–23)
BUN SERPL-MCNC: 86 MG/DL (ref 8–23)
BUN/CREAT SERPL: 26.5 (ref 7–25)
BUN/CREAT SERPL: 26.5 (ref 7–25)
CALCIUM SPEC-SCNC: 8.3 MG/DL (ref 8.6–10.5)
CALCIUM SPEC-SCNC: 8.6 MG/DL (ref 8.6–10.5)
CHLORIDE SERPL-SCNC: 96 MMOL/L (ref 98–107)
CHLORIDE SERPL-SCNC: 98 MMOL/L (ref 98–107)
CO2 SERPL-SCNC: 19.7 MMOL/L (ref 22–29)
CO2 SERPL-SCNC: 20.7 MMOL/L (ref 22–29)
CREAT SERPL-MCNC: 3.13 MG/DL (ref 0.57–1)
CREAT SERPL-MCNC: 3.24 MG/DL (ref 0.57–1)
DEPRECATED RDW RBC AUTO: 50.9 FL (ref 37–54)
EGFRCR SERPLBLD CKD-EPI 2021: 13.6 ML/MIN/1.73
EGFRCR SERPLBLD CKD-EPI 2021: 14.2 ML/MIN/1.73
EOSINOPHIL # BLD AUTO: 0.4 10*3/MM3 (ref 0–0.4)
EOSINOPHIL NFR BLD AUTO: 4.6 % (ref 0.3–6.2)
ERYTHROCYTE [DISTWIDTH] IN BLOOD BY AUTOMATED COUNT: 16.3 % (ref 12.3–15.4)
GLUCOSE BLDC GLUCOMTR-MCNC: 143 MG/DL (ref 70–130)
GLUCOSE BLDC GLUCOMTR-MCNC: 251 MG/DL (ref 70–130)
GLUCOSE BLDC GLUCOMTR-MCNC: 253 MG/DL (ref 70–130)
GLUCOSE BLDC GLUCOMTR-MCNC: 72 MG/DL (ref 70–130)
GLUCOSE SERPL-MCNC: 100 MG/DL (ref 65–99)
GLUCOSE SERPL-MCNC: 246 MG/DL (ref 65–99)
HCT VFR BLD AUTO: 28.3 % (ref 34–46.6)
HGB BLD-MCNC: 9.9 G/DL (ref 12–15.9)
IMM GRANULOCYTES # BLD AUTO: 0.04 10*3/MM3 (ref 0–0.05)
IMM GRANULOCYTES NFR BLD AUTO: 0.5 % (ref 0–0.5)
LYMPHOCYTES # BLD AUTO: 1.21 10*3/MM3 (ref 0.7–3.1)
LYMPHOCYTES NFR BLD AUTO: 13.9 % (ref 19.6–45.3)
MAGNESIUM SERPL-MCNC: 1.7 MG/DL (ref 1.6–2.4)
MCH RBC QN AUTO: 30.6 PG (ref 26.6–33)
MCHC RBC AUTO-ENTMCNC: 35 G/DL (ref 31.5–35.7)
MCV RBC AUTO: 87.3 FL (ref 79–97)
MONOCYTES # BLD AUTO: 0.72 10*3/MM3 (ref 0.1–0.9)
MONOCYTES NFR BLD AUTO: 8.3 % (ref 5–12)
NEUTROPHILS NFR BLD AUTO: 6.28 10*3/MM3 (ref 1.7–7)
NEUTROPHILS NFR BLD AUTO: 71.9 % (ref 42.7–76)
NRBC BLD AUTO-RTO: 0 /100 WBC (ref 0–0.2)
PHOSPHATE SERPL-MCNC: 7.1 MG/DL (ref 2.5–4.5)
PLATELET # BLD AUTO: 321 10*3/MM3 (ref 140–450)
PMV BLD AUTO: 9.7 FL (ref 6–12)
POTASSIUM SERPL-SCNC: 5 MMOL/L (ref 3.5–5.2)
POTASSIUM SERPL-SCNC: 5.3 MMOL/L (ref 3.5–5.2)
RBC # BLD AUTO: 3.24 10*6/MM3 (ref 3.77–5.28)
SODIUM SERPL-SCNC: 131 MMOL/L (ref 136–145)
SODIUM SERPL-SCNC: 137 MMOL/L (ref 136–145)
URATE SERPL-MCNC: 11.6 MG/DL (ref 2.4–5.7)
WBC NRBC COR # BLD AUTO: 8.72 10*3/MM3 (ref 3.4–10.8)

## 2024-10-14 PROCEDURE — 80048 BASIC METABOLIC PNL TOTAL CA: CPT | Performed by: HOSPITALIST

## 2024-10-14 PROCEDURE — 25010000002 ENOXAPARIN PER 10 MG: Performed by: HOSPITALIST

## 2024-10-14 PROCEDURE — 63710000001 INSULIN LISPRO (HUMAN) PER 5 UNITS: Performed by: NURSE PRACTITIONER

## 2024-10-14 PROCEDURE — 82948 REAGENT STRIP/BLOOD GLUCOSE: CPT

## 2024-10-14 PROCEDURE — 97110 THERAPEUTIC EXERCISES: CPT

## 2024-10-14 PROCEDURE — 25010000002 MORPHINE PER 10 MG: Performed by: HOSPITALIST

## 2024-10-14 PROCEDURE — 84550 ASSAY OF BLOOD/URIC ACID: CPT | Performed by: INTERNAL MEDICINE

## 2024-10-14 PROCEDURE — 25010000002 BUMETANIDE PER 0.5 MG: Performed by: INTERNAL MEDICINE

## 2024-10-14 PROCEDURE — 80069 RENAL FUNCTION PANEL: CPT | Performed by: INTERNAL MEDICINE

## 2024-10-14 PROCEDURE — 25010000002 ERTAPENEM PER 500 MG: Performed by: HOSPITALIST

## 2024-10-14 PROCEDURE — 83735 ASSAY OF MAGNESIUM: CPT | Performed by: INTERNAL MEDICINE

## 2024-10-14 PROCEDURE — 63710000001 INSULIN LISPRO (HUMAN) PER 5 UNITS: Performed by: HOSPITALIST

## 2024-10-14 PROCEDURE — 85025 COMPLETE CBC W/AUTO DIFF WBC: CPT | Performed by: INTERNAL MEDICINE

## 2024-10-14 PROCEDURE — 63710000001 INSULIN GLARGINE PER 5 UNITS: Performed by: HOSPITALIST

## 2024-10-14 RX ORDER — BUMETANIDE 0.25 MG/ML
4 INJECTION INTRAMUSCULAR; INTRAVENOUS EVERY 8 HOURS
Status: COMPLETED | OUTPATIENT
Start: 2024-10-14 | End: 2024-10-15

## 2024-10-14 RX ADMIN — LEVOTHYROXINE SODIUM 137 MCG: 137 TABLET ORAL at 05:32

## 2024-10-14 RX ADMIN — SODIUM BICARBONATE 1300 MG: 650 TABLET ORAL at 08:08

## 2024-10-14 RX ADMIN — HYDROCODONE BITARTRATE AND ACETAMINOPHEN 1 TABLET: 10; 325 TABLET ORAL at 19:31

## 2024-10-14 RX ADMIN — INSULIN LISPRO 6 UNITS: 100 INJECTION, SOLUTION INTRAVENOUS; SUBCUTANEOUS at 08:07

## 2024-10-14 RX ADMIN — MUPIROCIN 1 APPLICATION: 20 OINTMENT TOPICAL at 08:08

## 2024-10-14 RX ADMIN — PREGABALIN 100 MG: 75 CAPSULE ORAL at 08:06

## 2024-10-14 RX ADMIN — HYDRALAZINE HYDROCHLORIDE 50 MG: 50 TABLET ORAL at 05:32

## 2024-10-14 RX ADMIN — INSULIN GLARGINE 43 UNITS: 100 INJECTION, SOLUTION SUBCUTANEOUS at 08:07

## 2024-10-14 RX ADMIN — ANORECTAL OINTMENT 1 APPLICATION: 15.7; .44; 24; 20.6 OINTMENT TOPICAL at 18:21

## 2024-10-14 RX ADMIN — BUMETANIDE 4 MG: 0.25 INJECTION INTRAMUSCULAR; INTRAVENOUS at 19:05

## 2024-10-14 RX ADMIN — INSULIN LISPRO 6 UNITS: 100 INJECTION, SOLUTION INTRAVENOUS; SUBCUTANEOUS at 21:04

## 2024-10-14 RX ADMIN — HYDRALAZINE HYDROCHLORIDE 50 MG: 50 TABLET ORAL at 14:08

## 2024-10-14 RX ADMIN — PANTOPRAZOLE SODIUM 40 MG: 40 TABLET, DELAYED RELEASE ORAL at 05:31

## 2024-10-14 RX ADMIN — ANORECTAL OINTMENT 1 APPLICATION: 15.7; .44; 24; 20.6 OINTMENT TOPICAL at 21:07

## 2024-10-14 RX ADMIN — SODIUM BICARBONATE 1300 MG: 650 TABLET ORAL at 16:12

## 2024-10-14 RX ADMIN — DULOXETINE HYDROCHLORIDE 30 MG: 30 CAPSULE, DELAYED RELEASE ORAL at 08:08

## 2024-10-14 RX ADMIN — Medication 500 MG: at 08:06

## 2024-10-14 RX ADMIN — MORPHINE SULFATE 2 MG: 2 INJECTION, SOLUTION INTRAMUSCULAR; INTRAVENOUS at 05:32

## 2024-10-14 RX ADMIN — LIDOCAINE 1 APPLICATION: 4 CREAM TOPICAL at 08:09

## 2024-10-14 RX ADMIN — MUPIROCIN 1 APPLICATION: 20 OINTMENT TOPICAL at 21:04

## 2024-10-14 RX ADMIN — HYDRALAZINE HYDROCHLORIDE 50 MG: 50 TABLET ORAL at 21:04

## 2024-10-14 RX ADMIN — INSULIN LISPRO 10 UNITS: 100 INJECTION, SOLUTION INTRAVENOUS; SUBCUTANEOUS at 08:07

## 2024-10-14 RX ADMIN — ATORVASTATIN CALCIUM 80 MG: 80 TABLET, FILM COATED ORAL at 21:07

## 2024-10-14 RX ADMIN — BUMETANIDE 4 MG: 0.25 INJECTION INTRAMUSCULAR; INTRAVENOUS at 10:28

## 2024-10-14 RX ADMIN — ERTAPENEM SODIUM 500 MG: 1 INJECTION, POWDER, LYOPHILIZED, FOR SOLUTION INTRAMUSCULAR; INTRAVENOUS at 14:08

## 2024-10-14 RX ADMIN — TERAZOSIN 1 MG: 1 CAPSULE ORAL at 21:04

## 2024-10-14 RX ADMIN — ENOXAPARIN SODIUM 30 MG: 100 INJECTION SUBCUTANEOUS at 16:12

## 2024-10-14 RX ADMIN — Medication 500 MG: at 21:07

## 2024-10-14 RX ADMIN — PREGABALIN 100 MG: 75 CAPSULE ORAL at 21:04

## 2024-10-14 RX ADMIN — SODIUM BICARBONATE 1300 MG: 650 TABLET ORAL at 21:04

## 2024-10-14 RX ADMIN — INSULIN LISPRO 10 UNITS: 100 INJECTION, SOLUTION INTRAVENOUS; SUBCUTANEOUS at 12:34

## 2024-10-14 RX ADMIN — HYDROCODONE BITARTRATE AND ACETAMINOPHEN 1 TABLET: 10; 325 TABLET ORAL at 05:32

## 2024-10-14 RX ADMIN — LIDOCAINE 1 APPLICATION: 4 CREAM TOPICAL at 21:07

## 2024-10-14 NOTE — PLAN OF CARE
Goal Outcome Evaluation:               Pt was alert and oriented times 4. Pt did require 2L's NC during the night while sleeping. V/S were stable. Pt did complain of pain during the night and medication was given along with pillow support. Pt stated medication did relieve the pain. Pt stated she was able to sleep in between care.

## 2024-10-14 NOTE — PLAN OF CARE
Goal Outcome Evaluation:  Plan of Care Reviewed With: patient           Outcome Evaluation: pt able to stand at bedside 3 times with a walker and moderate assist, able to take a few forward steps and sidesteps, pt is very shaky and weak, she is highly motivated, will cont to follow    Anticipated Discharge Disposition (PT): skilled nursing facility, assisted living (depends on progress)

## 2024-10-14 NOTE — PLAN OF CARE
Pt alert and oriented times 4. Pt found to be drowsy at the beginning of the shift but easily arouses. As shift progressed pt more alert. Pt not complaining of bilateral leg pain today, but  to touch. No pain medication needed. Amlactin cream applied on legs and wound care provided. Pt educated and updated on plan of care.

## 2024-10-14 NOTE — THERAPY TREATMENT NOTE
Patient Name: Halie Guidry  : 1940    MRN: [id number]                              Today's Date: 10/14/2024       Admit Date: 10/10/2024    Visit Dx:     ICD-10-CM ICD-9-CM   1. Acute UTI  N39.0 599.0   2. Type 2 diabetes mellitus with hyperglycemia, with long-term current use of insulin  E11.65 250.00    Z79.4 790.29     V58.67   3. Poorly controlled diabetes mellitus  E11.65 250.00   4. Venous stasis dermatitis of both lower extremities  I87.2 454.1   5. Chronic renal impairment, unspecified CKD stage  N18.9 585.9   6. Decreased mobility and endurance  Z74.09 780.99     Patient Active Problem List   Diagnosis    Compression fracture    Lumbar degenerative disc disease    Type 2 diabetes mellitus with hyperglycemia, with long-term current use of insulin    Hyperlipidemia    Benign essential hypertension    Primary hypothyroidism    Neuropathy    Osteoporosis    Proteinuria    Tobacco abuse    Generalized weakness    Spinal stenosis    Scoliosis    Arthritis    VBI (vertebrobasilar insufficiency)    Orthostatic hypotension    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    Severe hypothyroidism    Noncompliance with medication regimen    Vitamin D deficiency disease    Tremor    Seizure    Thoracic degenerative disc disease    Acute kidney injury (VIPUL) with acute tubular necrosis (ATN)    Hyperglycemia    Type II diabetes mellitus, uncontrolled    Lower abdominal pain    Transaminitis    Emphysematous cystitis    Choledocholithiasis    Hypokalemia    Hypoxia    Generalized abdominal pain    History of Clostridium difficile infection    History of ERCP    Hypomagnesemia    Anxiety disorder    Neuropathic pain    Intertrigo    Weakness of both lower extremities    Accelerated hypertension    Acute cystitis without hematuria    Left lower lobe pneumonia    Cellulitis and abscess of left lower extremity    Benign hypertension with CKD (chronic kidney disease) stage IV    Peripheral edema     Primary malignant neuroendocrine tumor of pancreas    Encounter for long-term (current) use of high-risk medication    Diabetic foot ulcer    Stage 3a chronic kidney disease    Pressure injury of buttock, stage 2    HTN (hypertension)    Anemia due to chronic kidney disease    Bilateral lower extremity edema    Cellulitis of right lower extremity    CKD (chronic kidney disease) stage 4, GFR 15-29 ml/min    Acute CHF    Bilateral cellulitis of lower leg    Acute UTI    UTI (urinary tract infection)    Acute UTI (urinary tract infection)    Metabolic acidosis    Cobalamin deficiency    Dependent edema    Gastroesophageal reflux disease    Mild neurocognitive disorder    Pancreatic neoplasm    Congestive heart failure     Past Medical History:   Diagnosis Date    Acute metabolic encephalopathy 06/24/2022    Anxiety     Arthritis     Benign essential hypertension 08/20/2014    Bleeding disorder     Depression     Diabetes     Diabetes mellitus, type 2     Disc degeneration, lumbar     Headache, tension-type     Hyperlipidemia     Hypothyroidism     Neuropathy     Osteoporosis 09/09/2015    Pancreatic mass     Sept 2023, on Monthly Octreotide Injection    Peripheral neuropathy     Rotator cuff tear, left     Scoliosis     Shoulder pain     LEFT, TORN ROTATOR CUFF S/P FALL    Spinal stenosis      Past Surgical History:   Procedure Laterality Date    APPENDECTOMY      BILATERAL BREAST REDUCTION Bilateral 08/2015    CATARACT EXTRACTION  03/2015    CHOLECYSTECTOMY WITH INTRAOPERATIVE CHOLANGIOGRAM N/A 3/27/2022    Procedure: CHOLECYSTECTOMY LAPAROSCOPIC INTRAOPERATIVE CHOLANGIOGRAM;  Surgeon: Aiyana Hill MD;  Location: [facility] MAIN OR;  Service: General;  Laterality: N/A;    COLONOSCOPY  06/05/2015    WNL    ERCP N/A 2/25/2022    Procedure: ENDOSCOPIC RETROGRADE CHOLANGIOPANCREATOGRAPHY with sphincterotomy and balloon sweep;  Surgeon: Chilo Wilhelm MD;  Location: [facility] ENDOSCOPY;  Service: Gastroenterology;   Laterality: N/A;  PRE/POST - CBD stones    ERCP N/A 3/28/2022    Procedure: ENDOSCOPIC RETROGRADE CHOLANGIOPANCREATOGRAPHY WITH SPHINCTEROTOMY AND BALLOON SWEEP;  Surgeon: Chilo Wilhelm MD;  Location: [facility] ENDOSCOPY;  Service: Gastroenterology;  Laterality: N/A;  PRE: COMMON DUCT STONE  POST: COMMON DUCT STONE    KYPHOPLASTY      REDUCTION MAMMAPLASTY      TONSILLECTOMY        General Information       Row Name 10/14/24 1706          Physical Therapy Time and Intention    Document Type therapy note (daily note)  -PC     Mode of Treatment physical therapy  -PC       Row Name 10/14/24 1706          Cognition    Orientation Status (Cognition) oriented x 3  -PC               User Key  (r) = Recorded By, (t) = Taken By, (c) = Cosigned By      Initials Name Provider Type    PC Lenora Menjivar PT Physical Therapist                   Mobility       Row Name 10/14/24 1707          Bed Mobility    Supine-Sit [place] (Bed Mobility) minimum assist (75% patient effort)  -PC     Sit-Supine [place] (Bed Mobility) minimum assist (75% patient effort)  -PC       Row Name 10/14/24 1707          Sit-Stand Transfer    Sit-Stand [place] (Transfers) moderate assist (50% patient effort)  -PC     Assistive Device (Sit-Stand Transfers) walker, front-wheeled  -PC     Comment, (Sit-Stand Transfer) sit to stand x 3, difficulty coming fully upright, forward flexed over walker, pulling on walker, leaning posteriorly  -PC       Row Name 10/14/24 1707          Gait/Stairs (Locomotion)    [place] Level (Gait) moderate assist (50% patient effort)  -PC     Assistive Device (Gait) walker, front-wheeled  -PC     Distance in Feet (Gait) 4  pt amb 4 ft forward and backward, then 3 sidesteps  -PC     Deviations/Abnormal Patterns (Gait) base of support, wide;weight shifting decreased;gait speed decreased  -PC     Left Sided Gait Deviations forward flexed posture  -PC               User Key  (r) = Recorded By, (t) = Taken By,  (c) = Cosigned By      Initials Name Provider Type    Lenora Winslow, PT Physical Therapist                   Obj/Interventions       Row Name 10/14/24 1710          Balance    Balance Interventions sitting;standing;static;dynamic;moderate challenge  -PC     Comment, Balance posterior lean  -PC               User Key  (r) = Recorded By, (t) = Taken By, (c) = Cosigned By      Initials Name Provider Type    Lenora Winslow, PT Physical Therapist                   Goals/Plan    No documentation.                  Clinical Impression       Row Name 10/14/24 1712          Pain    Pain Location extremity  -PC     Pain Side/Orientation bilateral;lower  -PC     Pain Management Interventions exercise or physical activity utilized;positioning techniques utilized  -PC       Row Name 10/14/24 1712          Plan of Care Review    Plan of Care Reviewed With patient  -PC     Outcome Evaluation pt able to stand at bedside 3 times with a walker and moderate assist, able to take a few forward steps and sidesteps, pt is very shaky and weak, she is highly motivated, will cont to follow  -PC       Row Name 10/14/24 1712          Positioning and Restraints    Pre-Treatment Position in bed  -PC     Post Treatment Position bed  -PC     In Bed supine;call light within reach;encouraged to call for assist;exit alarm on  -PC               User Key  (r) = Recorded By, (t) = Taken By, (c) = Cosigned By      Initials Name Provider Type    Lenora Winslow, PT Physical Therapist                   Outcome Measures       Row Name 10/14/24 1714 10/14/24 0800       How much help from another person do you currently need...    Turning from your back to your side while in flat bed without using bedrails? 3  -PC 3  -MR    Moving from lying on back to sitting on the side of a flat bed without bedrails? 3  -PC 3  -MR    Moving to and from a bed to a chair (including a wheelchair)? 2  -PC 2  -MR    Standing up from a chair using your arms (e.g.,  wheelchair, bedside chair)? 2  -PC 2  -MR    Climbing 3-5 steps with a railing? 1  -PC 1  -MR    To walk in hospital room? 2  -PC 1  -MR    AM-PAC 6 Clicks Score (PT) 13  -PC 12  -MR              User Key  (r) = Recorded By, (t) = Taken By, (c) = Cosigned By      Initials Name Provider Type    PC Lenora Menjivar, PT Physical Therapist    MR Clarisse Chahal, RN Registered Nurse                                 Physical Therapy Education       Title: PT OT SLP Therapies (Done)       Topic: Physical Therapy (Done)       Point: Mobility training (Done)       Learning Progress Summary            Patient Acceptance, E,D, DU,NR by  at 10/14/2024 1714    Acceptance, E,D, NR by JR at 10/13/2024 1505    Acceptance, E, VU by MR at 10/12/2024 1831                      Point: Home exercise program (Done)       Learning Progress Summary            Patient Acceptance, E,D, DU,NR by  at 10/14/2024 1714    Acceptance, E,D, NR by JR at 10/13/2024 1505    Acceptance, E, VU by MR at 10/12/2024 1831                      Point: Body mechanics (Done)       Learning Progress Summary            Patient Acceptance, E,D, DU,NR by  at 10/14/2024 1714    Acceptance, E,D, NR by JR at 10/13/2024 1505    Acceptance, E, VU by MR at 10/12/2024 1831                      Point: Precautions (Done)       Learning Progress Summary            Patient Acceptance, E,D, DU,NR by  at 10/14/2024 1714    Acceptance, E,D, NR by  at 10/13/2024 1505    Acceptance, E, VU by MR at 10/12/2024 1831                                      User Key       Initials Effective Dates Name Provider Type Discipline     06/16/21 -  Lenora Menjivar, PT Physical Therapist PT    JR 06/16/21 -  Ulisses Warren, PT Physical Therapist PT    MR 08/03/23 -  Clarisse Chahal, RN Registered Nurse Nurse                  PT Recommendation and Plan     Outcome Evaluation: pt able to stand at bedside 3 times with a walker and moderate assist, able to take a few forward steps and  sidesteps, pt is very shaky and weak, she is highly motivated, will cont to follow     Time Calculation:         PT Charges       Row Name 10/14/24 1715             Time Calculation    Start Time 1628  -PC      Stop Time 1655  -PC      Time Calculation (min) 27 min  -PC      PT Received On 10/14/24  -PC      PT - Next Appointment 10/15/24  -PC                User Key  (r) = Recorded By, (t) = Taken By, (c) = Cosigned By      Initials Name Provider Type    PC Lenora Menjivar PT Physical Therapist                  Therapy Charges for Today       Code Description Service Date Service Provider Modifiers Qty    07492836217 HC PT THER PROC EA 15 MIN 10/14/2024 Lenora Menjivar PT GP 2            PT G-Codes  AM-PAC 6 Clicks Score (PT): 13  PT Discharge Summary  Anticipated Discharge Disposition (PT): skilled nursing facility, assisted living (depends on progress)    eLnora Menjivar PT  10/14/2024

## 2024-10-14 NOTE — PROGRESS NOTES
[facility]     LOS: 3 days     Subjective:    CC:Hyperglycemia    DIET:  Diet Order   Procedures    Diet: Cardiac, Diabetic; Healthy Heart (2-3 Na+); Consistent Carbohydrate; Fluid Consistency: Thin (IDDSI 0)   Patient awake and alert and talking to me today, nurse reports little more somnolent midmorning after IV pain medication and oral pain medication.  Mild hypoglycemia this afternoon with blood sugar 72.    Objective:    Vital Signs:  Temp:  [97.2 °F (36.2 °C)-98.4 °F (36.9 °C)] 97.4 °F (36.3 °C)  Heart Rate:  [54-77] 77  Resp:  [13-25] 25  BP: (100-161)/(42-85) 131/54    SpO2:  [93 %-97 %] 93 %  on  Flow (L/min) (Oxygen Therapy):  [2] 2;   Device (Oxygen Therapy): nasal cannula  Body mass index is 32.83 kg/m².    Physical Exam  HENT:      Head: Normocephalic and atraumatic.   Cardiovascular:      Heart sounds: No murmur heard.     No friction rub.   Pulmonary:      Effort: Pulmonary effort is normal.      Breath sounds: Normal breath sounds.   Abdominal:      General: Bowel sounds are normal. There is no distension.      Palpations: Abdomen is soft.      Tenderness: There is no abdominal tenderness.   Musculoskeletal:      Comments: Lower extremities extremely sensitive to touch, mild erythema, minimal warmth of the legs   Skin:     General: Skin is warm and dry.         Results Review:    Glucose   Date Value Ref Range Status   10/14/2024 246 (H) 65 - 99 mg/dL Final   10/13/2024 234 (H) 65 - 99 mg/dL Final   10/12/2024 256 (H) 65 - 99 mg/dL Final   10/11/2024 291 (H) 65 - 99 mg/dL Final     Results from last 7 days   Lab Units 10/14/24  0714   WBC 10*3/mm3 8.72   HEMOGLOBIN g/dL 9.9*   HEMATOCRIT % 28.3*   PLATELETS 10*3/mm3 321     Results from last 7 days   Lab Units 10/14/24  0714 10/13/24  0416   SODIUM mmol/L 131* 134*   POTASSIUM mmol/L 5.3* 4.7   CHLORIDE mmol/L 96* 99   CO2 mmol/L 19.7* 20.4*   BUN mg/dL 83* 72*   CREATININE mg/dL 3.13* 2.37*   CALCIUM mg/dL 8.3* 8.5*    BILIRUBIN mg/dL  --  <0.2   ALK PHOS U/L  --  131*   ALT (SGPT) U/L  --  5   AST (SGOT) U/L  --  8   GLUCOSE mg/dL 246* 234*         Results from last 7 days   Lab Units 10/14/24  0714   MAGNESIUM mg/dL 1.7         Cultures:  Urine Culture   Date Value Ref Range Status   10/10/2024 >100,000 CFU/mL Klebsiella pneumoniae ESBL (A)  Preliminary       I have reviewed daily medications and changes in CPOE    Scheduled meds  ammonium lactate, 1 Application, Topical, BID  atorvastatin, 80 mg, Oral, Nightly  bisoprolol, 5 mg, Oral, Daily  DULoxetine, 30 mg, Oral, Daily  enoxaparin, 30 mg, Subcutaneous, Q24H  ertapenem, 500 mg, Intravenous, Q24H  ferrous sulfate, 325 mg, Oral, Every Other Day  hydrALAZINE, 50 mg, Oral, Q8H  insulin glargine, 43 Units, Subcutaneous, Daily  insulin lispro, 10 Units, Subcutaneous, TID With Meals  insulin lispro, 2-9 Units, Subcutaneous, 4x Daily AC & at Bedtime  levothyroxine, 137 mcg, Oral, Q AM  Menthol-Zinc Oxide, 1 Application, Topical, Q12H  mupirocin, 1 Application, Each Nare, BID  pantoprazole, 40 mg, Oral, Q AM  pregabalin, 100 mg, Oral, BID  saccharomyces boulardii, 500 mg, Oral, BID  sodium bicarbonate, 1,300 mg, Oral, TID  terazosin, 1 mg, Oral, Nightly             PRN meds    acetaminophen **OR** acetaminophen **OR** acetaminophen    senna-docusate sodium **AND** polyethylene glycol **AND** bisacodyl **AND** bisacodyl    dextrose    dextrose    glucagon (human recombinant)    hydrALAZINE    HYDROcodone-acetaminophen    melatonin    Morphine    ondansetron ODT **OR** ondansetron    [COMPLETED] Insert Peripheral IV **AND** sodium chloride        CKD (chronic kidney disease) stage 4, GFR 15-29 ml/min    Type 2 diabetes mellitus with hyperglycemia, with long-term current use of insulin    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    Severe hypothyroidism    Acute kidney injury (VIPUL) with acute tubular necrosis (ATN)    Anxiety disorder    Neuropathic pain    Bilateral  lower extremity edema    Bilateral cellulitis of lower leg    Acute UTI (urinary tract infection)    Metabolic acidosis        Assessment/Plan:    84 y.o. female admitted  with hyperglycemia who presented with elevated anion gap and mild acidosis along with mildly elevated potassium of 5.4    Hyperkalmeia  -monitor, recheck this afternoon     Type II diabetes mellitus  With chronic use of insulin  Complicated by Autonomic neuropathy  Diabetic foot ulcer- History of   -Elevated A1c  -Blood sugar now on the low side, but is could be because of worsening renal function causing decreased clearance of insulin.  Will get rid of mealtime insulin except for sliding scale and decrease Lantus back to 40 units     Metabolic Acidosis   -better     Well-Differentiated pancreatic neuroedocrine tumor-octreotide 30 mg monthly, patient states she is due to have the infusion tomorrow, I discussed the case with Dr Gutierrez over the weekend and she will need to reschedule when out of the hospital    CKD chronic disease stage IV  VIPUL ATN  -Nephrology consultation  -Creatinine worse     Anemia chronic kidney disease   -hb is stable    Urinary tract infection  -ESBL E. coli in urine, invanz x 3 days     Bilateral lower extremity edema  -Legs are better     Hypothyroidism  Continue levothyroxine     HFpEF    Hyponatremia  -sodium is 131     I discussed with the patient and she wishes to be a DNR/DNI        Nolan Dasilva MD  10/14/24  08:39 EDT

## 2024-10-14 NOTE — PROGRESS NOTES
[facility] Progress Note      Patient Name: Halie Guidry  : 1940  MRN: [id number]  Primary Care Physician:  Napoleon Mead MD  Date of admission: 10/10/2024    Subjective     Interval History:   Follow-up acute kidney injury on chronic kidney    Patient is feeling better, continue to have significant discomfort of her lower extremities, no chest pain or shortness of air, no orthopnea or PND, no nausea or vomiting.    Review of Systems:   As noted above    Objective     Vitals:   Temp:  [97.2 °F (36.2 °C)-98.4 °F (36.9 °C)] 97.4 °F (36.3 °C)  Heart Rate:  [54-77] 77  Resp:  [13-25] 25  BP: (100-161)/(42-85) 131/54  Flow (L/min) (Oxygen Therapy):  [2] 2    Intake/Output Summary (Last 24 hours) at 10/14/2024 0931  Last data filed at 10/14/2024 0200  Gross per 24 hour   Intake 660 ml   Output 500 ml   Net 160 ml       Physical Exam:    General Appearance: alert, awake, chronically ill, no acute distress   Skin: warm and dry  HEENT: oral mucosa normal, nonicteric sclera  Neck: No JVD  Lungs: Clear to auscultation, unlabored breathing effort  Heart: RRR, no rub  Abdomen: soft, nontender, nondistended  : no palpable bladder  Extremities: Brawny BLE edema with warmth and erythema, presumed ulcerations to bilateral heels (covered with dressing), no clubbing or cyanosis   Neuro: normal speech and mental status     Scheduled Meds:     ammonium lactate, 1 Application, Topical, BID  atorvastatin, 80 mg, Oral, Nightly  bisoprolol, 5 mg, Oral, Daily  DULoxetine, 30 mg, Oral, Daily  enoxaparin, 30 mg, Subcutaneous, Q24H  ertapenem, 500 mg, Intravenous, Q24H  ferrous sulfate, 325 mg, Oral, Every Other Day  hydrALAZINE, 50 mg, Oral, Q8H  insulin glargine, 43 Units, Subcutaneous, Daily  insulin lispro, 10 Units, Subcutaneous, TID With Meals  insulin lispro, 2-9 Units, Subcutaneous, 4x Daily AC & at Bedtime  levothyroxine, 137 mcg, Oral, Q AM  Menthol-Zinc Oxide, 1 Application, Topical,  Q12H  mupirocin, 1 Application, Each Nare, BID  pantoprazole, 40 mg, Oral, Q AM  pregabalin, 100 mg, Oral, BID  saccharomyces boulardii, 500 mg, Oral, BID  sodium bicarbonate, 1,300 mg, Oral, TID  terazosin, 1 mg, Oral, Nightly      IV Meds:        Results Reviewed:   I have personally reviewed the results from the time of this admission to 10/14/2024 09:31 EDT     Results from last 7 days   Lab Units 10/14/24  0714 10/13/24  0416 10/12/24  0550 10/11/24  0455 10/10/24  1220   SODIUM mmol/L 131* 134* 136   < > 130*   POTASSIUM mmol/L 5.3* 4.7 4.6   < > 5.2   CHLORIDE mmol/L 96* 99 107   < > 99   CO2 mmol/L 19.7* 20.4* 18.3*   < > 18.0*   BUN mg/dL 83* 72* 68*   < > 83*   CREATININE mg/dL 3.13* 2.37* 2.52*   < > 2.91*   CALCIUM mg/dL 8.3* 8.5* 8.4*   < > 8.7   BILIRUBIN mg/dL  --  <0.2 0.2  --  0.3   ALK PHOS U/L  --  131* 131*  --  157*   ALT (SGPT) U/L  --  5 <5  --  7   AST (SGOT) U/L  --  8 8  --  10   GLUCOSE mg/dL 246* 234* 256*   < > 550*    < > = values in this interval not displayed.       Estimated Creatinine Clearance: 16.4 mL/min (A) (by C-G formula based on SCr of 3.13 mg/dL (H)).    Results from last 7 days   Lab Units 10/14/24  0714 10/13/24  0416   MAGNESIUM mg/dL 1.7 1.6   PHOSPHORUS mg/dL 7.1* 5.9*       Results from last 7 days   Lab Units 10/14/24  0714 10/13/24  0416   URIC ACID mg/dL 11.6* 11.0*       Results from last 7 days   Lab Units 10/14/24  0714 10/13/24  0416 10/12/24  0550 10/11/24  0455 10/10/24  1220   WBC 10*3/mm3 8.72 9.49 9.30 11.27* 9.10   HEMOGLOBIN g/dL 9.9* 9.6* 9.4* 8.6* 9.4*   PLATELETS 10*3/mm3 321 350 321 350 348             Assessment / Plan     ASSESSMENT:  Acute kidney injury vs. Progression of chronic kidney disease stage IV- Patient creatinine up until April of this year was previously running ~1.5 +/- but unfortunately after her hospitalization at that time for VIPUL and hyperglycemia her kidney function did not recover to baseline. For the last two months her  creatinine has been running 2.2-2.6 mg/dl. Etiology is likely progression of her kidney disease secondary to poorly controlled diabetes.  Volume status stable, sodium 131, potassium 5.3, creatinine up to 3.13 and BUN 83, uric acid 11.6, patient is likely hypovolemia  Hyperkalemia- Resolved. Potassium on admission 5.4. Improved with holding outpatient spironolactone and lisinopril as well as glucose correction.  Potassium today up to 5.3  Hyponatremia-sodium down to 130, associate with volume excess  Type II diabetes with renal complications- Poorly controlled. Hgb A1c 10.3 on admission. Patient states she lives in assisted living but does not receive medication assistance. She reports very poor understanding of her disease management. Will defer treatment and education to primary but did explain that this is likely the cause of her kidney disease progression and stressed the importance of getting her glucose under control.   Metabolic acidosis- Co2 19.7,today.  On oral sodium bicarb  Bilateral lower extremity cellulitis- Treatment per primary team  UTI- U treated.  History of hypertension-blood pressure is reasonably controlled  Anemia in chronic kidney disease- Hgb today 9.6 g/dl, improving. Unfortunately while she is being treated for active infection she would not benefit from IV iron replacement or KHUSHBU.  Hyperuricemia straight with decreased effective intravascular    PLAN:  Continue the same treatment  Try to diurese today  Surveillance labs  Monitor for diuretic toxicity    I reviewed the chart and other providers notes, reviewed labs.  Discussed the case with the patient and she voiced good understanding  Copied text in this note has been reviewed and is accurate as of 10/14/24.       Thank you for involving us in the care of Halie Guidry.  Please feel free to call with any questions.    Chidi Lanier MD  10/14/24  09:31 EDT    [facility]  [phone]    Please note  that portions of this note were completed with a voice recognition program.

## 2024-10-15 LAB
ALBUMIN SERPL-MCNC: 3.2 G/DL (ref 3.5–5.2)
ALBUMIN/GLOB SERPL: 0.8 G/DL
ALP SERPL-CCNC: 124 U/L (ref 39–117)
ALT SERPL W P-5'-P-CCNC: 5 U/L (ref 1–33)
ANION GAP SERPL CALCULATED.3IONS-SCNC: 11.7 MMOL/L (ref 5–15)
AST SERPL-CCNC: 9 U/L (ref 1–32)
BASOPHILS # BLD AUTO: 0.09 10*3/MM3 (ref 0–0.2)
BASOPHILS NFR BLD AUTO: 1 % (ref 0–1.5)
BILIRUB SERPL-MCNC: 0.2 MG/DL (ref 0–1.2)
BUN SERPL-MCNC: 89 MG/DL (ref 8–23)
BUN/CREAT SERPL: 27 (ref 7–25)
CALCIUM SPEC-SCNC: 8.2 MG/DL (ref 8.6–10.5)
CHLORIDE SERPL-SCNC: 99 MMOL/L (ref 98–107)
CO2 SERPL-SCNC: 21.3 MMOL/L (ref 22–29)
CREAT SERPL-MCNC: 3.3 MG/DL (ref 0.57–1)
DEPRECATED RDW RBC AUTO: 51.5 FL (ref 37–54)
EGFRCR SERPLBLD CKD-EPI 2021: 13.3 ML/MIN/1.73
EOSINOPHIL # BLD AUTO: 0.45 10*3/MM3 (ref 0–0.4)
EOSINOPHIL NFR BLD AUTO: 4.8 % (ref 0.3–6.2)
ERYTHROCYTE [DISTWIDTH] IN BLOOD BY AUTOMATED COUNT: 16.3 % (ref 12.3–15.4)
GLOBULIN UR ELPH-MCNC: 3.9 GM/DL
GLUCOSE BLDC GLUCOMTR-MCNC: 120 MG/DL (ref 70–130)
GLUCOSE BLDC GLUCOMTR-MCNC: 138 MG/DL (ref 70–130)
GLUCOSE BLDC GLUCOMTR-MCNC: 232 MG/DL (ref 70–130)
GLUCOSE BLDC GLUCOMTR-MCNC: 270 MG/DL (ref 70–130)
GLUCOSE BLDC GLUCOMTR-MCNC: 322 MG/DL (ref 70–130)
GLUCOSE SERPL-MCNC: 111 MG/DL (ref 65–99)
HCT VFR BLD AUTO: 29.3 % (ref 34–46.6)
HGB BLD-MCNC: 10.1 G/DL (ref 12–15.9)
IMM GRANULOCYTES # BLD AUTO: 0.04 10*3/MM3 (ref 0–0.05)
IMM GRANULOCYTES NFR BLD AUTO: 0.4 % (ref 0–0.5)
LYMPHOCYTES # BLD AUTO: 1.1 10*3/MM3 (ref 0.7–3.1)
LYMPHOCYTES NFR BLD AUTO: 11.8 % (ref 19.6–45.3)
MAGNESIUM SERPL-MCNC: 1.8 MG/DL (ref 1.6–2.4)
MCH RBC QN AUTO: 29.6 PG (ref 26.6–33)
MCHC RBC AUTO-ENTMCNC: 34.5 G/DL (ref 31.5–35.7)
MCV RBC AUTO: 85.9 FL (ref 79–97)
MONOCYTES # BLD AUTO: 0.97 10*3/MM3 (ref 0.1–0.9)
MONOCYTES NFR BLD AUTO: 10.4 % (ref 5–12)
NEUTROPHILS NFR BLD AUTO: 6.71 10*3/MM3 (ref 1.7–7)
NEUTROPHILS NFR BLD AUTO: 71.6 % (ref 42.7–76)
NRBC BLD AUTO-RTO: 0 /100 WBC (ref 0–0.2)
PHOSPHATE SERPL-MCNC: 6.8 MG/DL (ref 2.5–4.5)
PLATELET # BLD AUTO: 304 10*3/MM3 (ref 140–450)
PMV BLD AUTO: 9.8 FL (ref 6–12)
POTASSIUM SERPL-SCNC: 4.8 MMOL/L (ref 3.5–5.2)
PROT SERPL-MCNC: 7.1 G/DL (ref 6–8.5)
RBC # BLD AUTO: 3.41 10*6/MM3 (ref 3.77–5.28)
SODIUM SERPL-SCNC: 132 MMOL/L (ref 136–145)
URATE SERPL-MCNC: 12.1 MG/DL (ref 2.4–5.7)
WBC NRBC COR # BLD AUTO: 9.36 10*3/MM3 (ref 3.4–10.8)

## 2024-10-15 PROCEDURE — 63710000001 INSULIN LISPRO (HUMAN) PER 5 UNITS: Performed by: NURSE PRACTITIONER

## 2024-10-15 PROCEDURE — 25010000002 BUMETANIDE PER 0.5 MG: Performed by: INTERNAL MEDICINE

## 2024-10-15 PROCEDURE — 83735 ASSAY OF MAGNESIUM: CPT | Performed by: INTERNAL MEDICINE

## 2024-10-15 PROCEDURE — 63710000001 INSULIN GLARGINE PER 5 UNITS: Performed by: HOSPITALIST

## 2024-10-15 PROCEDURE — 94799 UNLISTED PULMONARY SVC/PX: CPT

## 2024-10-15 PROCEDURE — 97110 THERAPEUTIC EXERCISES: CPT

## 2024-10-15 PROCEDURE — 82948 REAGENT STRIP/BLOOD GLUCOSE: CPT

## 2024-10-15 PROCEDURE — 84550 ASSAY OF BLOOD/URIC ACID: CPT | Performed by: INTERNAL MEDICINE

## 2024-10-15 PROCEDURE — 25010000002 ENOXAPARIN PER 10 MG: Performed by: HOSPITALIST

## 2024-10-15 PROCEDURE — 84100 ASSAY OF PHOSPHORUS: CPT | Performed by: INTERNAL MEDICINE

## 2024-10-15 PROCEDURE — 94761 N-INVAS EAR/PLS OXIMETRY MLT: CPT

## 2024-10-15 PROCEDURE — 97530 THERAPEUTIC ACTIVITIES: CPT

## 2024-10-15 PROCEDURE — 25810000003 SODIUM CHLORIDE 0.9 % SOLUTION: Performed by: INTERNAL MEDICINE

## 2024-10-15 PROCEDURE — 80053 COMPREHEN METABOLIC PANEL: CPT | Performed by: INTERNAL MEDICINE

## 2024-10-15 PROCEDURE — 85025 COMPLETE CBC W/AUTO DIFF WBC: CPT | Performed by: INTERNAL MEDICINE

## 2024-10-15 RX ORDER — SODIUM CHLORIDE 9 MG/ML
100 INJECTION, SOLUTION INTRAVENOUS CONTINUOUS
Status: ACTIVE | OUTPATIENT
Start: 2024-10-15 | End: 2024-10-15

## 2024-10-15 RX ORDER — CASTOR OIL AND BALSAM, PERU 788; 87 MG/G; MG/G
1 OINTMENT TOPICAL EVERY 12 HOURS SCHEDULED
Status: DISCONTINUED | OUTPATIENT
Start: 2024-10-15 | End: 2024-10-19 | Stop reason: HOSPADM

## 2024-10-15 RX ADMIN — ANORECTAL OINTMENT 1 APPLICATION: 15.7; .44; 24; 20.6 OINTMENT TOPICAL at 08:42

## 2024-10-15 RX ADMIN — SODIUM CHLORIDE 100 ML/HR: 9 INJECTION, SOLUTION INTRAVENOUS at 11:42

## 2024-10-15 RX ADMIN — PREGABALIN 100 MG: 75 CAPSULE ORAL at 20:32

## 2024-10-15 RX ADMIN — PREGABALIN 75 MG: 75 CAPSULE ORAL at 08:35

## 2024-10-15 RX ADMIN — ANORECTAL OINTMENT 1 APPLICATION: 15.7; .44; 24; 20.6 OINTMENT TOPICAL at 23:03

## 2024-10-15 RX ADMIN — HYDRALAZINE HYDROCHLORIDE 50 MG: 50 TABLET ORAL at 23:03

## 2024-10-15 RX ADMIN — PANTOPRAZOLE SODIUM 40 MG: 40 TABLET, DELAYED RELEASE ORAL at 05:42

## 2024-10-15 RX ADMIN — INSULIN LISPRO 7 UNITS: 100 INJECTION, SOLUTION INTRAVENOUS; SUBCUTANEOUS at 11:41

## 2024-10-15 RX ADMIN — HYDRALAZINE HYDROCHLORIDE 50 MG: 50 TABLET ORAL at 15:31

## 2024-10-15 RX ADMIN — MUPIROCIN 1 APPLICATION: 20 OINTMENT TOPICAL at 20:36

## 2024-10-15 RX ADMIN — INSULIN LISPRO 6 UNITS: 100 INJECTION, SOLUTION INTRAVENOUS; SUBCUTANEOUS at 16:56

## 2024-10-15 RX ADMIN — INSULIN LISPRO 4 UNITS: 100 INJECTION, SOLUTION INTRAVENOUS; SUBCUTANEOUS at 20:47

## 2024-10-15 RX ADMIN — DULOXETINE HYDROCHLORIDE 30 MG: 30 CAPSULE, DELAYED RELEASE ORAL at 08:35

## 2024-10-15 RX ADMIN — ATORVASTATIN CALCIUM 80 MG: 80 TABLET, FILM COATED ORAL at 20:31

## 2024-10-15 RX ADMIN — SODIUM BICARBONATE 1300 MG: 650 TABLET ORAL at 16:55

## 2024-10-15 RX ADMIN — SODIUM BICARBONATE 1300 MG: 650 TABLET ORAL at 08:36

## 2024-10-15 RX ADMIN — BUMETANIDE 4 MG: 0.25 INJECTION INTRAMUSCULAR; INTRAVENOUS at 01:43

## 2024-10-15 RX ADMIN — HYDROCODONE BITARTRATE AND ACETAMINOPHEN 1 TABLET: 10; 325 TABLET ORAL at 23:00

## 2024-10-15 RX ADMIN — CASTOR OIL AND BALSAM, PERU 1 APPLICATION: 788; 87 OINTMENT TOPICAL at 22:54

## 2024-10-15 RX ADMIN — MUPIROCIN 1 APPLICATION: 20 OINTMENT TOPICAL at 08:36

## 2024-10-15 RX ADMIN — LIDOCAINE 1 APPLICATION: 4 CREAM TOPICAL at 08:42

## 2024-10-15 RX ADMIN — HYDROCODONE BITARTRATE AND ACETAMINOPHEN 1 TABLET: 10; 325 TABLET ORAL at 16:55

## 2024-10-15 RX ADMIN — TERAZOSIN 1 MG: 1 CAPSULE ORAL at 20:36

## 2024-10-15 RX ADMIN — Medication 500 MG: at 08:35

## 2024-10-15 RX ADMIN — SODIUM BICARBONATE 1300 MG: 650 TABLET ORAL at 20:32

## 2024-10-15 RX ADMIN — HYDRALAZINE HYDROCHLORIDE 50 MG: 50 TABLET ORAL at 05:41

## 2024-10-15 RX ADMIN — ACETAMINOPHEN 325MG 650 MG: 325 TABLET ORAL at 20:31

## 2024-10-15 RX ADMIN — HYDROCODONE BITARTRATE AND ACETAMINOPHEN 1 TABLET: 10; 325 TABLET ORAL at 09:05

## 2024-10-15 RX ADMIN — LEVOTHYROXINE SODIUM 137 MCG: 137 TABLET ORAL at 05:42

## 2024-10-15 RX ADMIN — CASTOR OIL AND BALSAM, PERU 1 APPLICATION: 788; 87 OINTMENT TOPICAL at 15:31

## 2024-10-15 RX ADMIN — ENOXAPARIN SODIUM 30 MG: 100 INJECTION SUBCUTANEOUS at 16:56

## 2024-10-15 RX ADMIN — FERROUS SULFATE TAB 325 MG (65 MG ELEMENTAL FE) 325 MG: 325 (65 FE) TAB at 08:39

## 2024-10-15 RX ADMIN — INSULIN GLARGINE 40 UNITS: 100 INJECTION, SOLUTION SUBCUTANEOUS at 08:39

## 2024-10-15 RX ADMIN — Medication 500 MG: at 20:33

## 2024-10-15 NOTE — CASE MANAGEMENT/SOCIAL WORK
Continued Stay Note  [facility]     Patient Name: Halie Guidry  MRN: [id number]  Today's Date: 10/15/2024    Admit Date: 10/10/2024    Plan: Referral to [facility] pending acceptance and will need precert VS back to assisted living at [place] with [facility]   Discharge Plan       Row Name 10/15/24 1410       Plan    Plan Referral to [facility] pending acceptance and will need precert VS back to assisted living at [place] with [facility]    Roadmap to Recovery Yes    Patient/Family in Agreement with Plan yes    Provided Post Acute Provider List? Yes    Post Acute Provider List Nursing Home    Provided Post Acute Provider Quality & Resource List? Yes    Post Acute Provider Quality and Resource List Nursing Home    Delivered To Patient    Method of Delivery In person    Plan Comments Spoke [facility] patient at bedside. Informed her of PT recommendatios for snf. CMS compare and snf list given. Yared is agreeable to snf referral to [facility]. She sates she will have referral VS [facility] with her original plan of back to assisted ligin with [facility]. Patient states she will make final decision closer to dc. Referral sent to [facility] and called to [facility] with [facility]. Pending acceptance and will need precert.                   Discharge Codes    No documentation.                 Expected Discharge Date and Time       Expected Discharge Date Expected Discharge Time    Oct 17, 2024               Marion Penn RN

## 2024-10-15 NOTE — PLAN OF CARE
The patient is alert and oriented x4. Able to move all extremities and follows commands. Pupils are PERRLA. Normal sinus. Room air. The patient has been educated on medications that were being given. The nurse was able to provide education that was well received. See morning labs. See MAR.    Goal Outcome Evaluation:  Plan of Care Reviewed With: patient        Progress: improving

## 2024-10-15 NOTE — THERAPY TREATMENT NOTE
Patient Name: Halie Guidry  : 1940    MRN: [id number]                              Today's Date: 10/15/2024       Admit Date: 10/10/2024    Visit Dx:     ICD-10-CM ICD-9-CM   1. Acute UTI  N39.0 599.0   2. Type 2 diabetes mellitus with hyperglycemia, with long-term current use of insulin  E11.65 250.00    Z79.4 790.29     V58.67   3. Poorly controlled diabetes mellitus  E11.65 250.00   4. Venous stasis dermatitis of both lower extremities  I87.2 454.1   5. Chronic renal impairment, unspecified CKD stage  N18.9 585.9   6. Decreased mobility and endurance  Z74.09 780.99     Patient Active Problem List   Diagnosis    Compression fracture    Lumbar degenerative disc disease    Type 2 diabetes mellitus with hyperglycemia, with long-term current use of insulin    Hyperlipidemia    Benign essential hypertension    Primary hypothyroidism    Neuropathy    Osteoporosis    Proteinuria    Tobacco abuse    Generalized weakness    Spinal stenosis    Scoliosis    Arthritis    VBI (vertebrobasilar insufficiency)    Orthostatic hypotension    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    Severe hypothyroidism    Noncompliance with medication regimen    Vitamin D deficiency disease    Tremor    Seizure    Thoracic degenerative disc disease    Acute kidney injury (VIPUL) with acute tubular necrosis (ATN)    Hyperglycemia    Type II diabetes mellitus, uncontrolled    Lower abdominal pain    Transaminitis    Emphysematous cystitis    Choledocholithiasis    Hypokalemia    Hypoxia    Generalized abdominal pain    History of Clostridium difficile infection    History of ERCP    Hypomagnesemia    Anxiety disorder    Neuropathic pain    Intertrigo    Weakness of both lower extremities    Accelerated hypertension    Acute cystitis without hematuria    Left lower lobe pneumonia    Cellulitis and abscess of left lower extremity    Benign hypertension with CKD (chronic kidney disease) stage IV    Peripheral edema     Primary malignant neuroendocrine tumor of pancreas    Encounter for long-term (current) use of high-risk medication    Diabetic foot ulcer    Stage 3a chronic kidney disease    Pressure injury of buttock, stage 2    HTN (hypertension)    Anemia due to chronic kidney disease    Bilateral lower extremity edema    Cellulitis of right lower extremity    CKD (chronic kidney disease) stage 4, GFR 15-29 ml/min    Acute CHF    Bilateral cellulitis of lower leg    Acute UTI    UTI (urinary tract infection)    Acute UTI (urinary tract infection)    Metabolic acidosis    Cobalamin deficiency    Dependent edema    Gastroesophageal reflux disease    Mild neurocognitive disorder    Pancreatic neoplasm    Congestive heart failure     Past Medical History:   Diagnosis Date    Acute metabolic encephalopathy 06/24/2022    Anxiety     Arthritis     Benign essential hypertension 08/20/2014    Bleeding disorder     Depression     Diabetes     Diabetes mellitus, type 2     Disc degeneration, lumbar     Headache, tension-type     Hyperlipidemia     Hypothyroidism     Neuropathy     Osteoporosis 09/09/2015    Pancreatic mass     Sept 2023, on Monthly Octreotide Injection    Peripheral neuropathy     Rotator cuff tear, left     Scoliosis     Shoulder pain     LEFT, TORN ROTATOR CUFF S/P FALL    Spinal stenosis      Past Surgical History:   Procedure Laterality Date    APPENDECTOMY      BILATERAL BREAST REDUCTION Bilateral 08/2015    CATARACT EXTRACTION  03/2015    CHOLECYSTECTOMY WITH INTRAOPERATIVE CHOLANGIOGRAM N/A 3/27/2022    Procedure: CHOLECYSTECTOMY LAPAROSCOPIC INTRAOPERATIVE CHOLANGIOGRAM;  Surgeon: Aiyana Hill MD;  Location: [facility] MAIN OR;  Service: General;  Laterality: N/A;    COLONOSCOPY  06/05/2015    WNL    ERCP N/A 2/25/2022    Procedure: ENDOSCOPIC RETROGRADE CHOLANGIOPANCREATOGRAPHY with sphincterotomy and balloon sweep;  Surgeon: Chilo Wilhelm MD;  Location: [facility] ENDOSCOPY;  Service: Gastroenterology;   Laterality: N/A;  PRE/POST - CBD stones    ERCP N/A 3/28/2022    Procedure: ENDOSCOPIC RETROGRADE CHOLANGIOPANCREATOGRAPHY WITH SPHINCTEROTOMY AND BALLOON SWEEP;  Surgeon: Chilo Wilhelm MD;  Location: [facility] ENDOSCOPY;  Service: Gastroenterology;  Laterality: N/A;  PRE: COMMON DUCT STONE  POST: COMMON DUCT STONE    KYPHOPLASTY      REDUCTION MAMMAPLASTY      TONSILLECTOMY        General Information       Row Name 10/15/24 1402          Physical Therapy Time and Intention    Document Type therapy note (daily note)  -EM     Mode of Treatment individual therapy;physical therapy  -EM       Row Name 10/15/24 1402          General Information    Patient Profile Reviewed yes  -EM     Existing Precautions/Restrictions fall  -EM               User Key  (r) = Recorded By, (t) = Taken By, (c) = Cosigned By      Initials Name Provider Type    EM Nelly Serrano PT Physical Therapist                   Mobility       Row Name 10/15/24 1401          Bed Mobility    Supine-Sit [place] (Bed Mobility) minimum assist (75% patient effort);verbal cues  -EM     Sit-Supine [place] (Bed Mobility) minimum assist (75% patient effort)  -EM     Assistive Device (Bed Mobility) head of bed elevated  -EM     Comment, (Bed Mobility) assist to scoot out to EOB with use of drawsheet  -EM       Row Name 10/15/24 1405          Sit-Stand Transfer    Sit-Stand [place] (Transfers) minimum assist (75% patient effort);moderate assist (50% patient effort);2 person assist;verbal cues  -EM     Assistive Device (Sit-Stand Transfers) walker, front-wheeled  -EM     Comment, (Sit-Stand Transfer) sit to stand multiple times prior to ambulation in order to get bed changed, modAx2 on first attempt, improved on following attempts, stands with very forward flexed posture  -EM       Row Name 10/15/24 140          Gait/Stairs (Locomotion)    Weak[place] Level (Gait) minimum assist (75% patient effort);2 person assist  -EM     Assistive  "Device (Gait) walker, front-wheeled  -EM     Distance in Feet (Gait) 10  -EM     Deviations/Abnormal Patterns (Gait) stride length decreased;gait speed decreased  -EM     Bilateral Gait Deviations forward flexed posture  -EM     Comment, (Gait/Stairs) assist to guide rwx when turning  -EM               User Key  (r) = Recorded By, (t) = Taken By, (c) = Cosigned By      Initials Name Provider Type    EM Nelly Serrano PT Physical Therapist                   Obj/Interventions       Row Name 10/15/24 1404          Motor Skills    Therapeutic Exercise other (see comments)  AP, LAQ x 10 reps, AROM x 5 reps kinsey UEs  -EM               User Key  (r) = Recorded By, (t) = Taken By, (c) = Cosigned By      Initials Name Provider Type    EM Nelly Serrano PT Physical Therapist                   Goals/Plan    No documentation.                  Clinical Impression       Row Name 10/15/24 1406          Pain    Pain Side/Orientation generalized  -EM     Pre/Posttreatment Pain Comment c/o generalized pain, did not rate on numeric scale, c/o of her arms and legs \"locking up\"  -EM       Row Name 10/15/24 1406          Plan of Care Review    Plan of Care Reviewed With patient  -EM     Outcome Evaluation Pt sitting up in bed, awake and alert, agreeable to therapy. patient performed bed mobility with Jayson, assist with use of drawsheet to scoot forward to EOB, sit to stand multiple times at the bedside with rwx and min/modAx2 with cues for hand placement, pt ambulated about 10 feet with rwx and minAx2. Pt demonstrates progress with ability to ambulate away from bed today, demonstrates very forward flexed posture and requires assistance to guide and turn rwx. Recommend SNU at d/c.  -EM       Row Name 10/15/24 1406          Positioning and Restraints    Pre-Treatment Position in bed  -EM     Post Treatment Position bed  -EM     In Bed supine;with nsg  -EM               User Key  (r) = Recorded By, (t) = Taken By, (c) = Cosigned " By      Initials Name Provider Type    Nelly Fong PT Physical Therapist                   Outcome Measures       Row Name 10/15/24 1408 10/15/24 0842       How much help from another person do you currently need...    Turning from your back to your side while in flat bed without using bedrails? 3  -EM 3  -ST    Moving from lying on back to sitting on the side of a flat bed without bedrails? 3  -EM 3  -ST    Moving to and from a bed to a chair (including a wheelchair)? 2  -EM 2  -ST    Standing up from a chair using your arms (e.g., wheelchair, bedside chair)? 2  -EM 2  -ST    Climbing 3-5 steps with a railing? 1  -EM 1  -ST    To walk in hospital room? 2  -EM 1  -ST    AM-PAC 6 Clicks Score (PT) 13  -EM 12  -ST              User Key  (r) = Recorded By, (t) = Taken By, (c) = Cosigned By      Initials Name Provider Type    Nelly Fong PT Physical Therapist    Bethany Lopez, RN Registered Nurse                                 Physical Therapy Education       Title: PT OT SLP Therapies (Done)       Topic: Physical Therapy (Done)       Point: Mobility training (Done)       Learning Progress Summary            Patient Acceptance, E, VU by EM at 10/15/2024 1408    Acceptance, E,D, DU,NR by PC at 10/14/2024 1714    Acceptance, E,D, NR by JR at 10/13/2024 1505    Acceptance, E, VU by MR at 10/12/2024 1831                      Point: Home exercise program (Done)       Learning Progress Summary            Patient Acceptance, E,D, DU,NR by PC at 10/14/2024 1714    Acceptance, E,D, NR by JR at 10/13/2024 1505    Acceptance, E, VU by MR at 10/12/2024 1831                      Point: Body mechanics (Done)       Learning Progress Summary            Patient Acceptance, E,D, DU,NR by PC at 10/14/2024 1714    Acceptance, E,D, NR by JR at 10/13/2024 1505    Acceptance, E, VU by MR at 10/12/2024 1831                      Point: Precautions (Done)       Learning Progress Summary            Patient  Acceptance, E,D, DU,NR by  at 10/14/2024 1714    Acceptance, E,D, NR by JR at 10/13/2024 1505    Acceptance, E, VU by MR at 10/12/2024 1831                                      User Key       Initials Effective Dates Name Provider Type Discipline    PC 06/16/21 -  Lenora Menjivar, PT Physical Therapist PT    EM 06/16/21 -  Nelly Serrano, PT Physical Therapist PT    JR 06/16/21 -  Ulisses Warren, PT Physical Therapist PT    MR 08/03/23 -  Clarisse Chahal, RN Registered Nurse Nurse                  PT Recommendation and Plan     Outcome Evaluation: Pt sitting up in bed, awake and alert, agreeable to therapy. patient performed bed mobility with Jayson, assist with use of drawsheet to scoot forward to EOB, sit to stand multiple times at the bedside with rwx and min/modAx2 with cues for hand placement, pt ambulated about 10 feet with rwx and minAx2. Pt demonstrates progress with ability to ambulate away from bed today, demonstrates very forward flexed posture and requires assistance to guide and turn rwx. Recommend SNU at d/c.     Time Calculation:         PT Charges       Row Name 10/15/24 1409             Time Calculation    Start Time 1310  -EM      Stop Time 1342  -EM      Time Calculation (min) 32 min  -EM      PT Received On 10/15/24  -EM      PT - Next Appointment 10/16/24  -EM         Time Calculation- PT    Total Timed Code Minutes- PT 32 minute(s)  -EM         Timed Charges    36956 - PT Therapeutic Exercise Minutes 10  -EM      09446 - PT Therapeutic Activity Minutes 22  -EM         Total Minutes    Timed Charges Total Minutes 32  -EM       Total Minutes 32  -EM                User Key  (r) = Recorded By, (t) = Taken By, (c) = Cosigned By      Initials Name Provider Type    EM Nelly Serrano, PT Physical Therapist                  Therapy Charges for Today       Code Description Service Date Service Provider Modifiers Qty    87119192980 HC PT THER PROC EA 15 MIN 10/15/2024 Nelly Serrano  PT GP 1    47633130975 HC PT THERAPEUTIC ACT EA 15 MIN 10/15/2024 Nelly Serrano, PT GP 1    24826364439 HC PT THER SUPP EA 15 MIN 10/15/2024 Nelly Serrano, PT GP 1            PT G-Codes  AM-PAC 6 Clicks Score (PT): 13       Nelly Serrano, PT  10/15/2024

## 2024-10-15 NOTE — PLAN OF CARE
AAOX4. RA. SR. Labs stable. Started IV fluids, patient to receive 1L. Wound RN changed wound orders, wound care completed. PRN oral pain medication given.       Goal Outcome Evaluation:  Plan of Care Reviewed With: patient        Progress: improving          Problem: Adult Inpatient Plan of Care  Goal: Plan of Care Review  Outcome: Progressing  Flowsheets (Taken 10/15/2024 1713)  Progress: improving  Plan of Care Reviewed With: patient  Goal: Patient-Specific Goal (Individualized)  Outcome: Progressing  Goal: Absence of Hospital-Acquired Illness or Injury  Outcome: Progressing  Intervention: Identify and Manage Fall Risk  Recent Flowsheet Documentation  Taken 10/15/2024 1600 by Bethany Burleson RN  Safety Promotion/Fall Prevention:   safety round/check completed   room organization consistent   nonskid shoes/slippers when out of bed   lighting adjusted   clutter free environment maintained   assistive device/personal items within reach   activity supervised  Taken 10/15/2024 1415 by Bethany Burleson RN  Safety Promotion/Fall Prevention:   safety round/check completed   room organization consistent   nonskid shoes/slippers when out of bed   lighting adjusted   clutter free environment maintained   assistive device/personal items within reach   activity supervised  Taken 10/15/2024 1230 by Bethany Burleson RN  Safety Promotion/Fall Prevention:   safety round/check completed   room organization consistent   nonskid shoes/slippers when out of bed   lighting adjusted   assistive device/personal items within reach   clutter free environment maintained   activity supervised  Taken 10/15/2024 1020 by Bethany Burleson RN  Safety Promotion/Fall Prevention:   safety round/check completed   room organization consistent   nonskid shoes/slippers when out of bed   lighting adjusted   clutter free environment maintained   assistive device/personal items within reach   activity supervised  Taken 10/15/2024 0842 by Benjy  PAUL Sims  Safety Promotion/Fall Prevention:   safety round/check completed   room organization consistent   nonskid shoes/slippers when out of bed   lighting adjusted   clutter free environment maintained   assistive device/personal items within reach   activity supervised  Intervention: Prevent Skin Injury  Recent Flowsheet Documentation  Taken 10/15/2024 1600 by Bethany Burleson RN  Body Position: sitting up in bed  Taken 10/15/2024 1415 by Bethany Burleson RN  Body Position:   weight shifting   supine  Skin Protection:   incontinence pads utilized   protective footwear used   skin sealant/moisture barrier applied  Taken 10/15/2024 1230 by Bethany Burleson RN  Body Position: sitting up in bed  Taken 10/15/2024 1020 by Bethany Burleson RN  Body Position:   side-lying   left  Taken 10/15/2024 0842 by Bethany Burleson RN  Body Position:   position changed independently   tilted   left  Skin Protection:   incontinence pads utilized   skin sealant/moisture barrier applied   transparent dressing maintained   protective footwear used  Intervention: Prevent and Manage VTE (Venous Thromboembolism) Risk  Recent Flowsheet Documentation  Taken 10/15/2024 1415 by Bethany Burleson RN  VTE Prevention/Management:   bilateral   SCDs (sequential compression devices) off   patient refused intervention  Taken 10/15/2024 0842 by Bethany Burleson RN  VTE Prevention/Management:   bilateral   SCDs (sequential compression devices) off   patient refused intervention  Intervention: Prevent Infection  Recent Flowsheet Documentation  Taken 10/15/2024 1600 by Bethany Burleson RN  Infection Prevention:   single patient room provided   rest/sleep promoted   personal protective equipment utilized   hand hygiene promoted  Taken 10/15/2024 1415 by Bethany Burleson RN  Infection Prevention:   single patient room provided   rest/sleep promoted   personal protective equipment utilized   hand hygiene promoted  Taken 10/15/2024 1230 by  Bethany Burleson RN  Infection Prevention:   rest/sleep promoted   single patient room provided   personal protective equipment utilized   hand hygiene promoted  Taken 10/15/2024 1020 by Bethany Burleson RN  Infection Prevention:   single patient room provided   rest/sleep promoted   personal protective equipment utilized   hand hygiene promoted  Taken 10/15/2024 0842 by Bethany Burleson RN  Infection Prevention:   single patient room provided   rest/sleep promoted   personal protective equipment utilized   hand hygiene promoted  Goal: Optimal Comfort and Wellbeing  Outcome: Progressing  Intervention: Monitor Pain and Promote Comfort  Recent Flowsheet Documentation  Taken 10/15/2024 1415 by Bethany Burleson RN  Pain Management Interventions:   care clustered   position adjusted   pillow support provided  Taken 10/15/2024 0842 by Bethany Burleson RN  Pain Management Interventions:   pillow support provided   position adjusted   care clustered   pain medication given  Intervention: Provide Person-Centered Care  Recent Flowsheet Documentation  Taken 10/15/2024 1415 by Bethany Burleson RN  Trust Relationship/Rapport:   care explained   choices provided   emotional support provided   empathic listening provided   questions answered   questions encouraged   reassurance provided   thoughts/feelings acknowledged  Taken 10/15/2024 0842 by Bethany Burleson RN  Trust Relationship/Rapport:   care explained   choices provided   questions answered   emotional support provided   empathic listening provided   questions encouraged   reassurance provided   thoughts/feelings acknowledged  Goal: Readiness for Transition of Care  Outcome: Progressing     Problem: Skin Injury Risk Increased  Goal: Skin Health and Integrity  Outcome: Progressing  Intervention: Optimize Skin Protection  Recent Flowsheet Documentation  Taken 10/15/2024 1600 by Bethany Burleson RN  Head of Bed (HOB) Positioning: HOB elevated  Taken 10/15/2024 1415 by  Bethany Burleson RN  Activity Management: standing at bedside  Pressure Reduction Techniques: weight shift assistance provided  Head of Bed (HOB) Positioning: HOB elevated  Pressure Reduction Devices: specialty bed utilized  Skin Protection:   incontinence pads utilized   protective footwear used   skin sealant/moisture barrier applied  Taken 10/15/2024 1235 by Bethany Burleson RN  Activity Management: back to bed  Taken 10/15/2024 1230 by Bethany Burleson RN  Activity Management: activity encouraged  Head of Bed (HOB) Positioning: HOB elevated  Taken 10/15/2024 1020 by Bethany Burleson RN  Head of Bed (HOB) Positioning: HOB elevated  Taken 10/15/2024 0842 by Bethany Burleson RN  Pressure Reduction Techniques:   frequent weight shift encouraged   heels elevated off bed  Head of Bed (HOB) Positioning: HOB elevated  Pressure Reduction Devices: specialty bed utilized  Skin Protection:   incontinence pads utilized   skin sealant/moisture barrier applied   transparent dressing maintained   protective footwear used     Problem: Fall Injury Risk  Goal: Absence of Fall and Fall-Related Injury  Outcome: Progressing  Intervention: Identify and Manage Contributors  Recent Flowsheet Documentation  Taken 10/15/2024 1600 by Bethany Burleson RN  Medication Review/Management: medications reviewed  Taken 10/15/2024 1415 by Bethany Burleson RN  Medication Review/Management: medications reviewed  Taken 10/15/2024 1230 by Bethany Burleson RN  Medication Review/Management: medications reviewed  Taken 10/15/2024 1020 by Bethany Burleson RN  Medication Review/Management: medications reviewed  Taken 10/15/2024 0842 by Bethany Burleson RN  Medication Review/Management: medications reviewed  Intervention: Promote Injury-Free Environment  Recent Flowsheet Documentation  Taken 10/15/2024 1600 by Bethany Burleson RN  Safety Promotion/Fall Prevention:   safety round/check completed   room organization consistent   nonskid  shoes/slippers when out of bed   lighting adjusted   clutter free environment maintained   assistive device/personal items within reach   activity supervised  Taken 10/15/2024 1415 by Bethany Burleson RN  Safety Promotion/Fall Prevention:   safety round/check completed   room organization consistent   nonskid shoes/slippers when out of bed   lighting adjusted   clutter free environment maintained   assistive device/personal items within reach   activity supervised  Taken 10/15/2024 1230 by Bethany Burleson RN  Safety Promotion/Fall Prevention:   safety round/check completed   room organization consistent   nonskid shoes/slippers when out of bed   lighting adjusted   assistive device/personal items within reach   clutter free environment maintained   activity supervised  Taken 10/15/2024 1020 by Bethany Burleson RN  Safety Promotion/Fall Prevention:   safety round/check completed   room organization consistent   nonskid shoes/slippers when out of bed   lighting adjusted   clutter free environment maintained   assistive device/personal items within reach   activity supervised  Taken 10/15/2024 0842 by Bethany Burleson, RN  Safety Promotion/Fall Prevention:   safety round/check completed   room organization consistent   nonskid shoes/slippers when out of bed   lighting adjusted   clutter free environment maintained   assistive device/personal items within reach   activity supervised

## 2024-10-15 NOTE — PLAN OF CARE
Goal Outcome Evaluation:  Plan of Care Reviewed With: patient           Outcome Evaluation: Pt sitting up in bed, awake and alert, agreeable to therapy. patient performed bed mobility with Jayson, assist with use of drawsheet to scoot forward to EOB, sit to stand multiple times at the bedside with rwx and min/modAx2 with cues for hand placement, pt ambulated about 10 feet with rwx and minAx2. Pt demonstrates progress with ability to ambulate away from bed today, demonstrates very forward flexed posture and requires assistance to guide and turn rwx. Recommend SNU at d/c.

## 2024-10-15 NOTE — NURSING NOTE
CWCN: We see patient for follow-up of existing lesions since admission. An assessment showed no worsening of Coccyx lesions, only slight redness, blanchable, Calmoseptine should continue to be used.  In addition to bilateral lower leg, some scab has been detached, we decided to stop Ammonium lactate and apply Xeroform dressing, ABD pad and Kerlix wrap.  About bilateral heels there has been no change, but Venelex ointment is ordered.  Rn notified.  Please re-consult for any additional needs.

## 2024-10-15 NOTE — DISCHARGE PLACEMENT REQUEST
"Tabatha Guidry (84 y.o. Female)       Date of Birth   1940    Social Security Number       Address   [address]  [place] [id number]    Home Phone   [phone]    MRN   [id number]       Hinduism   Religious    Marital Status                               Admission Date   10/10/24    Admission Type   Emergency    Admitting Provider   Kim Barnard MD    Attending Provider   Savita Mccormick MD    Department, Room/Bed   [facility], N334/1       Discharge Date       Discharge Disposition       Discharge Destination                                 Attending Provider: Savita Mccormick MD    Allergies: Sulfa Antibiotics    Isolation: Contact   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)   Code Status: No CPR    Ht: 172.7 cm (67.99\")   Wt: 98.7 kg (217 lb 9.5 oz)    Admission Cmt: None   Principal Problem: CKD (chronic kidney disease) stage 4, GFR 15-29 ml/min [N18.4]                   Active Insurance as of 10/10/2024       Primary Coverage       Payor Plan Insurance Group Employer/Plan Group    [employer] MEDICARE REPLACEMENT [employer] MED ADV GROUP [id number]       Payor Plan Address Payor Plan Phone Number Payor Plan Fax Number Effective Dates    [address] [address]   1/1/2023 - None Entered    [facility] [place] [id number]         Subscriber Name Subscriber Birth Date Member ID       TABATHA GUIDRY 1940 [id number]                     Emergency Contacts        (Rel.) Home Phone Work Phone Mobile Phone    Derrick Guidry ([employer]) (Son) [phone] -- [phone]    Josse Guidry ([employer]) (Son) -- -- [phone]    JAYME GUIDRY (Grandchild) -- -- [phone]                "

## 2024-10-15 NOTE — PROGRESS NOTES
[facility] Progress Note      Patient Name: Halie Guidry  : 1940  MRN: [id number]  Primary Care Physician:  Napoleon Mead MD  Date of admission: 10/10/2024    Subjective     Interval History:   Follow-up acute kidney injury on chronic kidney    The patient is complaining of leg pain, denies any chest pain or shortness of air, no orthopnea or PND, no nausea or vomiting.    Review of Systems:   As noted above    Objective     Vitals:   Temp:  [97.6 °F (36.4 °C)-98.4 °F (36.9 °C)] 98.4 °F (36.9 °C)  Heart Rate:  [65-84] 75  Resp:  [12-16] 12  BP: (113-160)/(47-71) 130/47  Flow (L/min) (Oxygen Therapy):  [2] 2    Intake/Output Summary (Last 24 hours) at 10/15/2024 1040  Last data filed at 10/15/2024 0842  Gross per 24 hour   Intake 740 ml   Output 2500 ml   Net -1760 ml       Physical Exam:    General Appearance: alert, awake, chronically ill, no acute distress   Skin: warm and dry  HEENT: oral mucosa normal, nonicteric sclera  Neck: No JVD  Lungs: Clear to auscultation, unlabored breathing effort  Heart: RRR, no rub  Abdomen: soft, nontender, nondistended  : no palpable bladder  Extremities: Brawny BLE edema with warmth and erythema, presumed ulcerations to bilateral heels (covered with dressing), no clubbing or cyanosis   Neuro: normal speech and mental status     Scheduled Meds:     ammonium lactate, 1 Application, Topical, BID  atorvastatin, 80 mg, Oral, Nightly  bisoprolol, 5 mg, Oral, Daily  DULoxetine, 30 mg, Oral, Daily  enoxaparin, 30 mg, Subcutaneous, Q24H  ferrous sulfate, 325 mg, Oral, Every Other Day  hydrALAZINE, 50 mg, Oral, Q8H  insulin glargine, 40 Units, Subcutaneous, Daily  insulin lispro, 2-9 Units, Subcutaneous, 4x Daily AC & at Bedtime  levothyroxine, 137 mcg, Oral, Q AM  Menthol-Zinc Oxide, 1 Application, Topical, Q12H  mupirocin, 1 Application, Each Nare, BID  pantoprazole, 40 mg, Oral, Q AM  pregabalin, 100 mg, Oral, BID  saccharomyces boulardii,  500 mg, Oral, BID  sodium bicarbonate, 1,300 mg, Oral, TID  terazosin, 1 mg, Oral, Nightly      IV Meds:        Results Reviewed:   I have personally reviewed the results from the time of this admission to 10/15/2024 10:40 EDT     Results from last 7 days   Lab Units 10/15/24  0546 10/14/24  1649 10/14/24  0714 10/13/24  0416 10/12/24  0550   SODIUM mmol/L 132* 137 131* 134* 136   POTASSIUM mmol/L 4.8 5.0 5.3* 4.7 4.6   CHLORIDE mmol/L 99 98 96* 99 107   CO2 mmol/L 21.3* 20.7* 19.7* 20.4* 18.3*   BUN mg/dL 89* 86* 83* 72* 68*   CREATININE mg/dL 3.30* 3.24* 3.13* 2.37* 2.52*   CALCIUM mg/dL 8.2* 8.6 8.3* 8.5* 8.4*   BILIRUBIN mg/dL 0.2  --   --  <0.2 0.2   ALK PHOS U/L 124*  --   --  131* 131*   ALT (SGPT) U/L 5  --   --  5 <5   AST (SGOT) U/L 9  --   --  8 8   GLUCOSE mg/dL 111* 100* 246* 234* 256*       Estimated Creatinine Clearance: 15.6 mL/min (A) (by C-G formula based on SCr of 3.3 mg/dL (H)).    Results from last 7 days   Lab Units 10/15/24  0546 10/14/24  0714 10/13/24  0416   MAGNESIUM mg/dL 1.8 1.7 1.6   PHOSPHORUS mg/dL 6.8* 7.1* 5.9*       Results from last 7 days   Lab Units 10/15/24  0546 10/14/24  0714 10/13/24  0416   URIC ACID mg/dL 12.1* 11.6* 11.0*       Results from last 7 days   Lab Units 10/15/24  0547 10/14/24  0714 10/13/24  0416 10/12/24  0550 10/11/24  0455   WBC 10*3/mm3 9.36 8.72 9.49 9.30 11.27*   HEMOGLOBIN g/dL 10.1* 9.9* 9.6* 9.4* 8.6*   PLATELETS 10*3/mm3 304 321 350 321 350             Assessment / Plan     ASSESSMENT:  Acute kidney injury vs. Progression of chronic kidney disease stage IV- Patient creatinine up until April of this year was previously running ~1.5 +/- but unfortunately after her hospitalization at that time for VIPUL and hyperglycemia her kidney function did not recover to baseline. For the last two months her creatinine has been running 2.2-2.6 mg/dl. Etiology is likely progression of her kidney disease secondary to poorly controlled diabetes.  Volume status stable,  sodium 132, potassium 4.8, creatinine 3.3, relatively speaking stable and BUN 89, uric acid up to 12.2, patient is likely hypovolemic, with decreased effective intravascular volume  Hyperkalemia- Resolved. Potassium on admission 5.4. Improved with holding outpatient spironolactone and lisinopril as well as glucose correction.  Potassium today improved down to 4 point  Hyponatremia-sodium stable 132, associate with volume excess  Type II diabetes with renal complications- Poorly controlled. Hgb A1c 10.3 on admission. Patient states she lives in assisted living but does not receive medication assistance. She reports very poor understanding of her disease management. Will defer treatment and education to primary but did explain that this is likely the cause of her kidney disease progression and stressed the importance of getting her glucose under control.   Metabolic acidosis- Co2 21 point,today.  On oral sodium bicarb  Bilateral lower extremity cellulitis- Treatment per primary team  UTI- U treated.  History of hypertension-blood pressure is reasonably controlled  Anemia in chronic kidney disease- Hgb today 9.6 g/dl, improving. Unfortunately while she is being treated for active infection she would not benefit from IV iron replacement or KHUSHBU.  Hyperuricemia straight with decreased effective intravascular    PLAN:  Continue the same treatment  I will give 1 L of normal saline  Surveillance labs      I reviewed the chart and other providers notes, reviewed labs.  Discussed the case with the patient and she voiced good understanding  Copied text in this note has been reviewed and is accurate as of 10/15/24.       Thank you for involving us in the care of Halie Guidry.  Please feel free to call with any questions.    Chidi Lanier MD  10/15/24  10:40 EDT    [facility]  [phone]    Please note that portions of this note were completed with a voice recognition program.

## 2024-10-15 NOTE — PROGRESS NOTES
Nutrition Services    Patient Name: Halie Guidry  YOB: 1940  MRN: [id number]  Admission date: 10/10/2024    PROGRESS NOTE      Encounter Information: Pt intake very good at % most recorded meals since 10/11 (50% 1 meal). Wt remains stable.        PO Diet: Diet: Cardiac, Diabetic; Healthy Heart (2-3 Na+); Consistent Carbohydrate; Fluid Consistency: Thin (IDDSI 0)   PO Supplements: -   PO Intake:  % most meals since 10/11       Current nutrition support: -   Nutrition support review: -       Labs (reviewed below): Na 132, Phos 6.2, Glu 120-138-322       GI Function:  BM pending       Nutrition Intervention Updates: Initiate Bowel Regimen     Results from last 7 days   Lab Units 10/15/24  0546 10/14/24  1649 10/14/24  0714 10/13/24  0416 10/12/24  0550   SODIUM mmol/L 132* 137 131* 134* 136   POTASSIUM mmol/L 4.8 5.0 5.3* 4.7 4.6   CHLORIDE mmol/L 99 98 96* 99 107   CO2 mmol/L 21.3* 20.7* 19.7* 20.4* 18.3*   BUN mg/dL 89* 86* 83* 72* 68*   CREATININE mg/dL 3.30* 3.24* 3.13* 2.37* 2.52*   CALCIUM mg/dL 8.2* 8.6 8.3* 8.5* 8.4*   BILIRUBIN mg/dL 0.2  --   --  <0.2 0.2   ALK PHOS U/L 124*  --   --  131* 131*   ALT (SGPT) U/L 5  --   --  5 <5   AST (SGOT) U/L 9  --   --  8 8   GLUCOSE mg/dL 111* 100* 246* 234* 256*     Results from last 7 days   Lab Units 10/15/24  0547 10/15/24  0546 10/14/24  0714 10/13/24  0416   MAGNESIUM mg/dL  --  1.8 1.7 1.6   PHOSPHORUS mg/dL  --  6.8* 7.1* 5.9*   HEMOGLOBIN g/dL 10.1*  --  9.9* 9.6*   HEMATOCRIT % 29.3*  --  28.3* 30.2*     COVID19   Date Value Ref Range Status   01/27/2024 Not Detected Not Detected - Ref. Range Final     Lab Results   Component Value Date    HGBA1C 10.30 (H) 10/12/2024       RD to follow up per protocol.    Electronically signed by:  Carson Loyola RDN, LD  10/15/24 14:42 EDT

## 2024-10-15 NOTE — PROGRESS NOTES
Name: Halie Guidry ADMIT: 10/10/2024   : 1940  PCP: Napoleon Mead MD    MRN: [id number] LOS: 4 days   AGE/SEX: 84 y.o. female  ROOM: [facility]     Subjective   Subjective   No acute events overnight.  Patient continues to endorse ongoing muscle spasms.  Leg pain is her main complaint this morning.  No chest pain or shortness of breath.    Objective   Objective     Vital Signs  Temp:  [97.6 °F (36.4 °C)-98.4 °F (36.9 °C)] 98.4 °F (36.9 °C)  Heart Rate:  [65-84] 75  Resp:  [12-16] 12  BP: (113-160)/(47-71) 130/47  SpO2:  [85 %-98 %] 96 %  on  Flow (L/min) (Oxygen Therapy):  [2] 2;   Device (Oxygen Therapy): room air  Body mass index is 33.09 kg/m².    Physical Exam  General: Alert, no acute distress.  Chronically ill-appearing.  ENT: No conjunctival injection or scleral icterus. Moist mucous membranes.   Neuro: Eyes open and moving in all directions, generalized weakness, face symmetric, no focal deficits.   Lungs: Clear to auscultation bilaterally. No wheeze or crackles. No distress.   Heart: RRR, no murmurs.   Abdomen: Soft, non-tender, non-distended. Normal bowel sounds.   Ext: Trace edema bilateral lower extremities.  Chronic venous stasis changes with legs that are tender to palpation bilaterally.  Skin: No rashes or lesions. IV site without swelling or erythema.     Results Review     I reviewed the patient's new clinical results:  Results from last 7 days   Lab Units 10/15/24  0547 10/14/24  0714 10/13/24  0416 10/12/24  0550   WBC 10*3/mm3 9.36 8.72 9.49 9.30   HEMOGLOBIN g/dL 10.1* 9.9* 9.6* 9.4*   PLATELETS 10*3/mm3 304 321 350 321     Results from last 7 days   Lab Units 10/15/24  0546 10/14/24  1649 10/14/24  0714 10/13/24  0416   SODIUM mmol/L 132* 137 131* 134*   POTASSIUM mmol/L 4.8 5.0 5.3* 4.7   CHLORIDE mmol/L 99 98 96* 99   CO2 mmol/L 21.3* 20.7* 19.7* 20.4*   BUN mg/dL 89* 86* 83* 72*   CREATININE mg/dL 3.30* 3.24* 3.13* 2.37*   GLUCOSE mg/dL 111* 100* 246* 234*   EGFR mL/min/1.73  13.3* 13.6* 14.2* 19.8*     Results from last 7 days   Lab Units 10/15/24  0546 10/14/24  0714 10/13/24  0416 10/12/24  0550 10/10/24  1220   ALBUMIN g/dL 3.2* 3.5 3.5 3.4* 3.5   BILIRUBIN mg/dL 0.2  --  <0.2 0.2 0.3   ALK PHOS U/L 124*  --  131* 131* 157*   AST (SGOT) U/L 9  --  8 8 10   ALT (SGPT) U/L 5  --  5 <5 7     Results from last 7 days   Lab Units 10/15/24  0546 10/14/24  1649 10/14/24  0714 10/13/24  0416 10/12/24  0550   CALCIUM mg/dL 8.2* 8.6 8.3* 8.5* 8.4*   ALBUMIN g/dL 3.2*  --  3.5 3.5 3.4*   MAGNESIUM mg/dL 1.8  --  1.7 1.6  --    PHOSPHORUS mg/dL 6.8*  --  7.1* 5.9*  --        Glucose   Date/Time Value Ref Range Status   10/15/2024 0718 138 (H) 70 - 130 mg/dL Final   10/15/2024 0635 120 70 - 130 mg/dL Final   10/14/2024 2032 251 (H) 70 - 130 mg/dL Final   10/14/2024 1545 72 70 - 130 mg/dL Final   10/14/2024 1112 143 (H) 70 - 130 mg/dL Final   10/14/2024 0755 253 (H) 70 - 130 mg/dL Final   10/13/2024 2208 134 (H) 70 - 130 mg/dL Final       No radiology results for the last day    I have personally reviewed all medications:  Scheduled Medications  ammonium lactate, 1 Application, Topical, BID  atorvastatin, 80 mg, Oral, Nightly  bisoprolol, 5 mg, Oral, Daily  DULoxetine, 30 mg, Oral, Daily  enoxaparin, 30 mg, Subcutaneous, Q24H  ferrous sulfate, 325 mg, Oral, Every Other Day  hydrALAZINE, 50 mg, Oral, Q8H  insulin glargine, 40 Units, Subcutaneous, Daily  insulin lispro, 2-9 Units, Subcutaneous, 4x Daily AC & at Bedtime  levothyroxine, 137 mcg, Oral, Q AM  Menthol-Zinc Oxide, 1 Application, Topical, Q12H  mupirocin, 1 Application, Each Nare, BID  pantoprazole, 40 mg, Oral, Q AM  pregabalin, 100 mg, Oral, BID  saccharomyces boulardii, 500 mg, Oral, BID  sodium bicarbonate, 1,300 mg, Oral, TID  terazosin, 1 mg, Oral, Nightly    Infusions   Diet  Diet: Cardiac, Diabetic; Healthy Heart (2-3 Na+); Consistent Carbohydrate; Fluid Consistency: Thin (IDDSI 0)      Intake/Output Summary (Last 24 hours) at  10/15/2024 0935  Last data filed at 10/15/2024 0323  Gross per 24 hour   Intake 740 ml   Output 2000 ml   Net -1260 ml       Assessment/Plan     Active Hospital Problems    Diagnosis  POA   • **CKD (chronic kidney disease) stage 4, GFR 15-29 ml/min [N18.4]  Yes   • Acute UTI (urinary tract infection) [N39.0]  Yes   • Metabolic acidosis [E87.20]  Yes   • Bilateral cellulitis of lower leg [L03.116, L03.115]  Yes   • Bilateral lower extremity edema [R60.0]  Yes   • Anxiety disorder [F41.9]  Yes   • Neuropathic pain [M79.2]  Yes   • Acute kidney injury (VIPUL) with acute tubular necrosis (ATN) [N17.0]  Yes   • Severe hypothyroidism [E03.9]  Yes   • 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       84 y.o. female with CKD (chronic kidney disease) stage 4, GFR 15-29 ml/min.    Bilateral lower extremity neuropathy  -Bilateral lower extremity duplex negative for DVT  -Electrolytes reviewed and appropriate  -Continue Lyrica  -Difficult to ascertain exactly what is causing the patient's symptoms, but this seems to be her main complaint.  Will reach out to nephrology later to discuss potentially adding ropinirole to see if there is a component of restless leg that is contributing.    CKD stage IV  Hyponatremia  Hyperkalemia  Metabolic acidosis  -Creatinine stable today at 3.3, sodium 132, potassium normal  -Bicarbonate has improved to 21.3  -Nephrology consulted and following  -Diuresis per nephrology  -Continue sodium bicarbonate tablets 1300 mg 3 times daily  -Repeat BMP with morning labs    Type 2 diabetes mellitus  -Blood glucoses have been somewhat labile, but trend is overall improved in the last 24 hours  -Current regimen: Lantus 40 units daily and SSI  -Continue current regimen for now.  May need to further  decrease Lantus but we will see what blood glucoses do today since she has already received this morning's dose.    Urinary tract infection  -Urine culture growing Klebsiella ESBL  -ID was consulted  -S/p Invanz x 3 days    Anemia of chronic kidney disease  -Hgb improved to 10.1 today, consistent with baseline  -No signs of active bleeding  -Repeat CBC with morning labs, transfuse for Hgb less than 7    Hypertension  -Blood pressure trend acceptable at this time  -Continue current medications  -Ongoing titration of meds based on BP trend    Pancreatic neuroendocrine tumor  -Previous provider discussed with Dr. Gutierrez  -Receives monthly octreotide infusion, patient will need to reschedule this when she is out of the hospital    SCDs for DVT prophylaxis.  Limited code (no CPR, no intubation).  Discussed with patient.  Anticipate discharge TBD      Savita Mccormick MD  [facility]  10/15/24  09:35 EDT

## 2024-10-16 LAB
ALBUMIN SERPL-MCNC: 3.1 G/DL (ref 3.5–5.2)
ANION GAP SERPL CALCULATED.3IONS-SCNC: 12.2 MMOL/L (ref 5–15)
BASOPHILS # BLD AUTO: 0.08 10*3/MM3 (ref 0–0.2)
BASOPHILS NFR BLD AUTO: 1.1 % (ref 0–1.5)
BUN SERPL-MCNC: 85 MG/DL (ref 8–23)
BUN/CREAT SERPL: 27.7 (ref 7–25)
CALCIUM SPEC-SCNC: 8.6 MG/DL (ref 8.6–10.5)
CHLORIDE SERPL-SCNC: 97 MMOL/L (ref 98–107)
CO2 SERPL-SCNC: 20.8 MMOL/L (ref 22–29)
CREAT SERPL-MCNC: 3.07 MG/DL (ref 0.57–1)
DEPRECATED RDW RBC AUTO: 52.4 FL (ref 37–54)
EGFRCR SERPLBLD CKD-EPI 2021: 14.5 ML/MIN/1.73
EOSINOPHIL # BLD AUTO: 0.37 10*3/MM3 (ref 0–0.4)
EOSINOPHIL NFR BLD AUTO: 5 % (ref 0.3–6.2)
ERYTHROCYTE [DISTWIDTH] IN BLOOD BY AUTOMATED COUNT: 16.1 % (ref 12.3–15.4)
GLUCOSE BLDC GLUCOMTR-MCNC: 238 MG/DL (ref 70–130)
GLUCOSE BLDC GLUCOMTR-MCNC: 257 MG/DL (ref 70–130)
GLUCOSE BLDC GLUCOMTR-MCNC: 258 MG/DL (ref 70–130)
GLUCOSE BLDC GLUCOMTR-MCNC: 284 MG/DL (ref 70–130)
GLUCOSE SERPL-MCNC: 241 MG/DL (ref 65–99)
HCT VFR BLD AUTO: 29.1 % (ref 34–46.6)
HGB BLD-MCNC: 8.9 G/DL (ref 12–15.9)
IMM GRANULOCYTES # BLD AUTO: 0.05 10*3/MM3 (ref 0–0.05)
IMM GRANULOCYTES NFR BLD AUTO: 0.7 % (ref 0–0.5)
LYMPHOCYTES # BLD AUTO: 0.87 10*3/MM3 (ref 0.7–3.1)
LYMPHOCYTES NFR BLD AUTO: 11.7 % (ref 19.6–45.3)
MAGNESIUM SERPL-MCNC: 1.8 MG/DL (ref 1.6–2.4)
MCH RBC QN AUTO: 27 PG (ref 26.6–33)
MCHC RBC AUTO-ENTMCNC: 30.6 G/DL (ref 31.5–35.7)
MCV RBC AUTO: 88.2 FL (ref 79–97)
MONOCYTES # BLD AUTO: 0.65 10*3/MM3 (ref 0.1–0.9)
MONOCYTES NFR BLD AUTO: 8.7 % (ref 5–12)
NEUTROPHILS NFR BLD AUTO: 5.41 10*3/MM3 (ref 1.7–7)
NEUTROPHILS NFR BLD AUTO: 72.8 % (ref 42.7–76)
NRBC BLD AUTO-RTO: 0 /100 WBC (ref 0–0.2)
PHOSPHATE SERPL-MCNC: 7 MG/DL (ref 2.5–4.5)
PLATELET # BLD AUTO: 299 10*3/MM3 (ref 140–450)
PMV BLD AUTO: 10.2 FL (ref 6–12)
POTASSIUM SERPL-SCNC: 4.9 MMOL/L (ref 3.5–5.2)
RBC # BLD AUTO: 3.3 10*6/MM3 (ref 3.77–5.28)
SODIUM SERPL-SCNC: 130 MMOL/L (ref 136–145)
URATE SERPL-MCNC: 11.6 MG/DL (ref 2.4–5.7)
WBC NRBC COR # BLD AUTO: 7.43 10*3/MM3 (ref 3.4–10.8)

## 2024-10-16 PROCEDURE — 25010000002 MORPHINE PER 10 MG: Performed by: NURSE PRACTITIONER

## 2024-10-16 PROCEDURE — 85025 COMPLETE CBC W/AUTO DIFF WBC: CPT | Performed by: INTERNAL MEDICINE

## 2024-10-16 PROCEDURE — 25010000002 ENOXAPARIN PER 10 MG: Performed by: HOSPITALIST

## 2024-10-16 PROCEDURE — 83735 ASSAY OF MAGNESIUM: CPT | Performed by: STUDENT IN AN ORGANIZED HEALTH CARE EDUCATION/TRAINING PROGRAM

## 2024-10-16 PROCEDURE — 63710000001 INSULIN GLARGINE PER 5 UNITS: Performed by: STUDENT IN AN ORGANIZED HEALTH CARE EDUCATION/TRAINING PROGRAM

## 2024-10-16 PROCEDURE — 82948 REAGENT STRIP/BLOOD GLUCOSE: CPT

## 2024-10-16 PROCEDURE — 97110 THERAPEUTIC EXERCISES: CPT

## 2024-10-16 PROCEDURE — 63710000001 INSULIN LISPRO (HUMAN) PER 5 UNITS: Performed by: NURSE PRACTITIONER

## 2024-10-16 PROCEDURE — 84550 ASSAY OF BLOOD/URIC ACID: CPT | Performed by: INTERNAL MEDICINE

## 2024-10-16 PROCEDURE — 63710000001 INSULIN LISPRO (HUMAN) PER 5 UNITS: Performed by: STUDENT IN AN ORGANIZED HEALTH CARE EDUCATION/TRAINING PROGRAM

## 2024-10-16 PROCEDURE — 80069 RENAL FUNCTION PANEL: CPT | Performed by: INTERNAL MEDICINE

## 2024-10-16 PROCEDURE — 97530 THERAPEUTIC ACTIVITIES: CPT

## 2024-10-16 RX ORDER — MORPHINE SULFATE 2 MG/ML
2 INJECTION, SOLUTION INTRAMUSCULAR; INTRAVENOUS ONCE
Status: COMPLETED | OUTPATIENT
Start: 2024-10-16 | End: 2024-10-16

## 2024-10-16 RX ORDER — INSULIN LISPRO 100 [IU]/ML
5 INJECTION, SOLUTION INTRAVENOUS; SUBCUTANEOUS
Status: DISCONTINUED | OUTPATIENT
Start: 2024-10-16 | End: 2024-10-17

## 2024-10-16 RX ADMIN — BISOPROLOL FUMARATE 5 MG: 5 TABLET, FILM COATED ORAL at 08:46

## 2024-10-16 RX ADMIN — ATORVASTATIN CALCIUM 80 MG: 80 TABLET, FILM COATED ORAL at 21:57

## 2024-10-16 RX ADMIN — ANORECTAL OINTMENT 1 APPLICATION: 15.7; .44; 24; 20.6 OINTMENT TOPICAL at 08:46

## 2024-10-16 RX ADMIN — TERAZOSIN 1 MG: 1 CAPSULE ORAL at 21:58

## 2024-10-16 RX ADMIN — Medication 500 MG: at 08:46

## 2024-10-16 RX ADMIN — Medication 500 MG: at 21:57

## 2024-10-16 RX ADMIN — HYDROCODONE BITARTRATE AND ACETAMINOPHEN 1 TABLET: 10; 325 TABLET ORAL at 22:32

## 2024-10-16 RX ADMIN — SODIUM BICARBONATE 1300 MG: 650 TABLET ORAL at 21:57

## 2024-10-16 RX ADMIN — INSULIN LISPRO 6 UNITS: 100 INJECTION, SOLUTION INTRAVENOUS; SUBCUTANEOUS at 11:24

## 2024-10-16 RX ADMIN — MUPIROCIN 1 APPLICATION: 20 OINTMENT TOPICAL at 21:57

## 2024-10-16 RX ADMIN — CASTOR OIL AND BALSAM, PERU 1 APPLICATION: 788; 87 OINTMENT TOPICAL at 08:46

## 2024-10-16 RX ADMIN — INSULIN LISPRO 4 UNITS: 100 INJECTION, SOLUTION INTRAVENOUS; SUBCUTANEOUS at 22:32

## 2024-10-16 RX ADMIN — PREGABALIN 100 MG: 75 CAPSULE ORAL at 08:46

## 2024-10-16 RX ADMIN — MORPHINE SULFATE 2 MG: 2 INJECTION, SOLUTION INTRAMUSCULAR; INTRAVENOUS at 02:38

## 2024-10-16 RX ADMIN — HYDRALAZINE HYDROCHLORIDE 50 MG: 50 TABLET ORAL at 06:52

## 2024-10-16 RX ADMIN — HYDROCODONE BITARTRATE AND ACETAMINOPHEN 1 TABLET: 10; 325 TABLET ORAL at 16:26

## 2024-10-16 RX ADMIN — SODIUM BICARBONATE 1300 MG: 650 TABLET ORAL at 08:51

## 2024-10-16 RX ADMIN — PREGABALIN 100 MG: 75 CAPSULE ORAL at 21:57

## 2024-10-16 RX ADMIN — INSULIN LISPRO 6 UNITS: 100 INJECTION, SOLUTION INTRAVENOUS; SUBCUTANEOUS at 17:40

## 2024-10-16 RX ADMIN — INSULIN GLARGINE 50 UNITS: 100 INJECTION, SOLUTION SUBCUTANEOUS at 08:45

## 2024-10-16 RX ADMIN — ACETAMINOPHEN 325MG 650 MG: 325 TABLET ORAL at 11:24

## 2024-10-16 RX ADMIN — PANTOPRAZOLE SODIUM 40 MG: 40 TABLET, DELAYED RELEASE ORAL at 06:52

## 2024-10-16 RX ADMIN — LEVOTHYROXINE SODIUM 137 MCG: 137 TABLET ORAL at 06:52

## 2024-10-16 RX ADMIN — CASTOR OIL AND BALSAM, PERU 1 APPLICATION: 788; 87 OINTMENT TOPICAL at 22:01

## 2024-10-16 RX ADMIN — HYDROCODONE BITARTRATE AND ACETAMINOPHEN 1 TABLET: 10; 325 TABLET ORAL at 08:53

## 2024-10-16 RX ADMIN — SODIUM BICARBONATE 1300 MG: 650 TABLET ORAL at 15:19

## 2024-10-16 RX ADMIN — MUPIROCIN 1 APPLICATION: 20 OINTMENT TOPICAL at 08:46

## 2024-10-16 RX ADMIN — DULOXETINE HYDROCHLORIDE 30 MG: 30 CAPSULE, DELAYED RELEASE ORAL at 08:52

## 2024-10-16 RX ADMIN — HYDRALAZINE HYDROCHLORIDE 50 MG: 50 TABLET ORAL at 15:19

## 2024-10-16 RX ADMIN — HYDRALAZINE HYDROCHLORIDE 50 MG: 50 TABLET ORAL at 21:57

## 2024-10-16 RX ADMIN — INSULIN LISPRO 6 UNITS: 100 INJECTION, SOLUTION INTRAVENOUS; SUBCUTANEOUS at 06:53

## 2024-10-16 RX ADMIN — INSULIN LISPRO 5 UNITS: 100 INJECTION, SOLUTION INTRAVENOUS; SUBCUTANEOUS at 17:40

## 2024-10-16 RX ADMIN — ENOXAPARIN SODIUM 30 MG: 100 INJECTION SUBCUTANEOUS at 16:25

## 2024-10-16 RX ADMIN — ANORECTAL OINTMENT 1 APPLICATION: 15.7; .44; 24; 20.6 OINTMENT TOPICAL at 22:01

## 2024-10-16 NOTE — PLAN OF CARE
Goal Outcome Evaluation:  Plan of Care Reviewed With: patient        Progress: no change  Outcome Evaluation: A&Ox4 with intermittent disorientation, reoriented easily. Refused bath. With sleep, pulse ox decreased placed on 2L NC. c/o pain, PRN medication given as ordered however patient requests something else a one time dose of Morphine given, patient seems to be resting comfortable post administration. Respiratory rate even, nonlabored and no distress noted and woke up to take medications without difficulty.

## 2024-10-16 NOTE — PROGRESS NOTES
Name: Halie Guidry ADMIT: 10/10/2024   : 1940  PCP: Napoleon Mead MD    MRN: [id number] LOS: 5 days   AGE/SEX: 84 y.o. female  ROOM: [facility]     Subjective   Subjective   No acute events overnight.  Patient continues to have lower extremity pain but otherwise feeling about the same.  Intermittent shortness of breath.  No chest pain.  Nephrology holding diuretics today.    Objective   Objective     Vital Signs  Temp:  [96.4 °F (35.8 °C)-97.8 °F (36.6 °C)] 96.4 °F (35.8 °C)  Heart Rate:  [67-84] 67  Resp:  [10-18] 12  BP: (133-166)/(53-71) 140/53  SpO2:  [89 %-97 %] 94 %  on  Flow (L/min) (Oxygen Therapy):  [2] 2;   Device (Oxygen Therapy): room air  Body mass index is 33.36 kg/m².    Physical Exam  General: Alert, no acute distress.  Chronically ill-appearing.  ENT: No conjunctival injection or scleral icterus. Moist mucous membranes.   Neuro: Eyes open and moving in all directions, generalized weakness, face symmetric, no focal deficits.   Lungs: Clear to auscultation bilaterally. No wheeze or crackles. No distress.   Heart: RRR, no murmurs.   Abdomen: Soft, non-tender, non-distended. Normal bowel sounds.   Ext: Lower extremities wrapped in lymphedema wraps today, remains tender to palpation.  Skin: No rashes or lesions. IV site without swelling or erythema.     Results Review     I reviewed the patient's new clinical results:  Results from last 7 days   Lab Units 10/16/24  0435 10/15/24  0547 10/14/24  0714 10/13/24  0416   WBC 10*3/mm3 7.43 9.36 8.72 9.49   HEMOGLOBIN g/dL 8.9* 10.1* 9.9* 9.6*   PLATELETS 10*3/mm3 299 304 321 350     Results from last 7 days   Lab Units 10/16/24  0435 10/15/24  0546 10/14/24  1649 10/14/24  0714   SODIUM mmol/L 130* 132* 137 131*   POTASSIUM mmol/L 4.9 4.8 5.0 5.3*   CHLORIDE mmol/L 97* 99 98 96*   CO2 mmol/L 20.8* 21.3* 20.7* 19.7*   BUN mg/dL 85* 89* 86* 83*   CREATININE mg/dL 3.07* 3.30* 3.24* 3.13*   GLUCOSE mg/dL 241* 111* 100* 246*   EGFR mL/min/1.73  14.5* 13.3* 13.6* 14.2*     Results from last 7 days   Lab Units 10/16/24  0435 10/15/24  0546 10/14/24  0714 10/13/24  0416 10/12/24  0550 10/10/24  1220   ALBUMIN g/dL 3.1* 3.2* 3.5 3.5 3.4* 3.5   BILIRUBIN mg/dL  --  0.2  --  <0.2 0.2 0.3   ALK PHOS U/L  --  124*  --  131* 131* 157*   AST (SGOT) U/L  --  9  --  8 8 10   ALT (SGPT) U/L  --  5  --  5 <5 7     Results from last 7 days   Lab Units 10/16/24  0435 10/15/24  0546 10/14/24  1649 10/14/24  0714 10/13/24  0416   CALCIUM mg/dL 8.6 8.2* 8.6 8.3* 8.5*   ALBUMIN g/dL 3.1* 3.2*  --  3.5 3.5   MAGNESIUM mg/dL 1.8 1.8  --  1.7 1.6   PHOSPHORUS mg/dL 7.0* 6.8*  --  7.1* 5.9*       Glucose   Date/Time Value Ref Range Status   10/16/2024 1117 284 (H) 70 - 130 mg/dL Final   10/16/2024 0630 257 (H) 70 - 130 mg/dL Final   10/15/2024 2020 232 (H) 70 - 130 mg/dL Final   10/15/2024 1526 270 (H) 70 - 130 mg/dL Final   10/15/2024 1130 322 (H) 70 - 130 mg/dL Final   10/15/2024 0718 138 (H) 70 - 130 mg/dL Final   10/15/2024 0635 120 70 - 130 mg/dL Final       No radiology results for the last day    I have personally reviewed all medications:  Scheduled Medications  atorvastatin, 80 mg, Oral, Nightly  bisoprolol, 5 mg, Oral, Daily  castor oil-balsam peru, 1 Application, Topical, Q12H  DULoxetine, 30 mg, Oral, Daily  enoxaparin, 30 mg, Subcutaneous, Q24H  ferrous sulfate, 325 mg, Oral, Every Other Day  hydrALAZINE, 50 mg, Oral, Q8H  insulin glargine, 50 Units, Subcutaneous, Daily  insulin lispro, 2-9 Units, Subcutaneous, 4x Daily AC & at Bedtime  levothyroxine, 137 mcg, Oral, Q AM  Menthol-Zinc Oxide, 1 Application, Topical, Q12H  mupirocin, 1 Application, Each Nare, BID  pantoprazole, 40 mg, Oral, Q AM  pregabalin, 100 mg, Oral, BID  saccharomyces boulardii, 500 mg, Oral, BID  sodium bicarbonate, 1,300 mg, Oral, TID  terazosin, 1 mg, Oral, Nightly    Infusions   Diet  Diet: Cardiac, Diabetic; Healthy Heart (2-3 Na+); Consistent Carbohydrate; Fluid Consistency: Thin (IDDSI  0)      Intake/Output Summary (Last 24 hours) at 10/16/2024 1418  Last data filed at 10/15/2024 2100  Gross per 24 hour   Intake 1170 ml   Output --   Net 1170 ml       Assessment/Plan     Active Hospital Problems    Diagnosis  POA   • **CKD (chronic kidney disease) stage 4, GFR 15-29 ml/min [N18.4]  Yes   • Acute UTI (urinary tract infection) [N39.0]  Yes   • Metabolic acidosis [E87.20]  Yes   • Bilateral cellulitis of lower leg [L03.116, L03.115]  Yes   • Bilateral lower extremity edema [R60.0]  Yes   • Anxiety disorder [F41.9]  Yes   • Neuropathic pain [M79.2]  Yes   • Acute kidney injury (VIPUL) with acute tubular necrosis (ATN) [N17.0]  Yes   • Severe hypothyroidism [E03.9]  Yes   • 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       84 y.o. female with CKD (chronic kidney disease) stage 4, GFR 15-29 ml/min.    Bilateral lower extremity neuropathy  -Bilateral lower extremity duplex negative for DVT  -Electrolytes reviewed and appropriate  -Continue Lyrica    CKD stage IV  Hyponatremia  Hyperkalemia  Metabolic acidosis  -Creatinine slightly improved today to 3.07, potassium 4.9, sodium 130  -Bicarbonate fairly stable at 20.8  -Nephrology consulted and following  -Diuresis per nephrology, holding diuretics today  -Continue sodium bicarbonate tablets 1300 mg 3 times daily  -Repeat BMP with morning labs    Type 2 diabetes mellitus  -Current regimen: Lantus 40 units daily and SSI  -Blood glucoses extremely labile.  Going to increase Lantus to 50 units daily.  Will also add 5 units 3 times daily of mealtime insulin to see if this helps achieve better control.  Monitor for hypoglycemia, as she has had this previously.  -Ongoing titration of insulin based on requirements    Urinary tract infection  -Urine culture  growing Klebsiella ESBL  -ID was consulted  -S/p Invanz x 3 days    Anemia of chronic kidney disease  -Hgb decreased to 8.9 today  -No signs of active bleeding  -Repeat CBC with morning labs, transfuse for Hgb less than 7    Hypertension  -Blood pressure trend acceptable at this time  -Continue current medications  -Ongoing titration of meds based on BP trend    Pancreatic neuroendocrine tumor  -Previous provider discussed with Dr. Gutierrez  -Receives monthly octreotide infusion, patient will need to reschedule this when she is out of the hospital    [facility] for DVT prophylaxis.  Limited code (no CPR, no intubation).  Discussed with patient.  Anticipate discharge to SNF when arrangements have been made.  Patient will need pre-CERT.      Savita Mccormick MD  [facility]  10/16/24  14:18 EDT

## 2024-10-16 NOTE — CASE MANAGEMENT/SOCIAL WORK
Discharge Planning Assessment  [facility]     Patient Name: Halie Guidry  MRN: [id number]  Today's Date: 10/16/2024    Admit Date: 10/10/2024    Plan: Referrals to [facility] and [facility] and [place] pending accepting facility and will need precert.   Discharge Needs Assessment    No documentation.                  Discharge Plan       Row Name 10/16/24 1407       Plan    Plan Referrals to [facility] and [facility] and [place] pending accepting facility and will need precert.    Plan Comments Spoke with patient at bedside. Informed her of denial from [facility] due to facility full. patient verbalizes undertanding and asks for [facility]. Referrals called to liaisons and dcp sent. Pending accepting facility and will need precert                  Continued Care and Services - Admitted Since 10/10/2024       Destination       Service Provider Request Status Services Address Phone Fax Patient Preferred    [facility] Considering -- [address] [address] [phone] --    [facility] Considering -- [address] [address] [phone] --    [facility] Pending - Request Sent -- [address] [id number] [phone] --    [facility] Declined  Facility full -- [address] [facility] [id number] [phone] --              Home Medical Care Coordination complete.      Service Provider Request Status Services Address Phone Fax Patient Preferred    [address]  Selected Home Health Services [address], [address], [facility] [address] [phone]        Internal Comment last updated by Alon Hernandez, RN 10/11/2024 1015    Patient is current with [facility]. HEENA Ruggiero RN                             Selected Continued Care - Episodes  Includes continued care and service providers with selected services from the active episodes listed below      [facility] Episode start date: 9/10/2024   There are no active outsourced providers for this episode.             Chronic Care Management Episode start date: 7/31/2024 (Paused)   There are no active outsourced providers for this episode.                 Selected Continued Care - Prior Encounters Includes continued care and service providers with selected services from prior encounters from 7/12/2024 to 10/16/2024      Discharged on 9/4/2024 Admission date: 8/24/2024 - Discharge disposition: Skilled Nursing Facility (DC - External)      Destination       Service Provider Services Address Phone Fax Patient Preferred    [facility] [place] [address] [facility] [address] [phone] --                          Expected Discharge Date and Time       Expected Discharge Date Expected Discharge Time    Oct 17, 2024            Demographic Summary    No documentation.                  Functional Status    No documentation.                  Psychosocial    No documentation.                  Abuse/Neglect    No documentation.                  Legal    No documentation.                  Substance Abuse    No documentation.                  Patient Forms    No documentation.                     Marion Penn RN

## 2024-10-16 NOTE — CASE MANAGEMENT/SOCIAL WORK
Continued Stay Note  [facility]     Patient Name: Halie Guidry  MRN: [id number]  Today's Date: 10/16/2024    Admit Date: 10/10/2024    Plan: Accepted at [facility] with bed ready 10/17. Precert sent 10/16 and is pending approval   Discharge Plan       Row Name 10/16/24 1511       Plan    Plan Accepted at [facility] with bed ready 10/17. Precert sent 10/16 and is pending approval    Plan Comments Received acceptance form [facility]. Patient informed and is agreable. Precert request emailed to Post acute authorizations.      Row Name 10/16/24 6341       Plan    Plan Referrals to [facility] and [facility] pending accepting facility and will need precert.    Plan Comments Spoke with patient at bedside. Informed her of denial from [facility] due to facility full. patient verbalizes undertanding and asks for new referrals to her choices of [facility] and [facility]. Referrals called to liaisons and dcp sent. Pending accepting facility and will need precert                   Discharge Codes    No documentation.                 Expected Discharge Date and Time       Expected Discharge Date Expected Discharge Time    Oct 17, 2024               Marion Penn RN

## 2024-10-16 NOTE — PLAN OF CARE
"Goal Outcome Evaluation:  Plan of Care Reviewed With: patient           Outcome Evaluation: Pt was awake, alert and agreeable to PT this PM. Pt had c/o of generalized pain and was able to follow commands. Pt performed bed mobility supine-sit w/ Min A, sit to stand w/ Min A x2 and rxw, ambulated 7' w/ Min A x2 and rxw. Pt states \"I'm not go to rehab so I better start walking\". Pt demostrates having very short step pattern almost similiar to marching in place and forward flexed posture. Pt had increased fatigue with activity and required to sit down during mid walk to room door. Pt needed tactile cueing for walker management. PT will continue to follow. DC recommendation is SNF.                             "

## 2024-10-16 NOTE — THERAPY TREATMENT NOTE
Patient Name: Halie Guidry  : 1940    MRN: [id number]                              Today's Date: 10/16/2024       Admit Date: 10/10/2024    Visit Dx:     ICD-10-CM ICD-9-CM   1. Acute UTI  N39.0 599.0   2. Type 2 diabetes mellitus with hyperglycemia, with long-term current use of insulin  E11.65 250.00    Z79.4 790.29     V58.67   3. Poorly controlled diabetes mellitus  E11.65 250.00   4. Venous stasis dermatitis of both lower extremities  I87.2 454.1   5. Chronic renal impairment, unspecified CKD stage  N18.9 585.9   6. Decreased mobility and endurance  Z74.09 780.99     Patient Active Problem List   Diagnosis    Compression fracture    Lumbar degenerative disc disease    Type 2 diabetes mellitus with hyperglycemia, with long-term current use of insulin    Hyperlipidemia    Benign essential hypertension    Primary hypothyroidism    Neuropathy    Osteoporosis    Proteinuria    Tobacco abuse    Generalized weakness    Spinal stenosis    Scoliosis    Arthritis    VBI (vertebrobasilar insufficiency)    Orthostatic hypotension    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    Severe hypothyroidism    Noncompliance with medication regimen    Vitamin D deficiency disease    Tremor    Seizure    Thoracic degenerative disc disease    Acute kidney injury (VIPUL) with acute tubular necrosis (ATN)    Hyperglycemia    Type II diabetes mellitus, uncontrolled    Lower abdominal pain    Transaminitis    Emphysematous cystitis    Choledocholithiasis    Hypokalemia    Hypoxia    Generalized abdominal pain    History of Clostridium difficile infection    History of ERCP    Hypomagnesemia    Anxiety disorder    Neuropathic pain    Intertrigo    Weakness of both lower extremities    Accelerated hypertension    Acute cystitis without hematuria    Left lower lobe pneumonia    Cellulitis and abscess of left lower extremity    Benign hypertension with CKD (chronic kidney disease) stage IV    Peripheral edema     Primary malignant neuroendocrine tumor of pancreas    Encounter for long-term (current) use of high-risk medication    Diabetic foot ulcer    Stage 3a chronic kidney disease    Pressure injury of buttock, stage 2    HTN (hypertension)    Anemia due to chronic kidney disease    Bilateral lower extremity edema    Cellulitis of right lower extremity    CKD (chronic kidney disease) stage 4, GFR 15-29 ml/min    Acute CHF    Bilateral cellulitis of lower leg    Acute UTI    UTI (urinary tract infection)    Acute UTI (urinary tract infection)    Metabolic acidosis    Cobalamin deficiency    Dependent edema    Gastroesophageal reflux disease    Mild neurocognitive disorder    Pancreatic neoplasm    Congestive heart failure     Past Medical History:   Diagnosis Date    Acute metabolic encephalopathy 06/24/2022    Anxiety     Arthritis     Benign essential hypertension 08/20/2014    Bleeding disorder     Depression     Diabetes     Diabetes mellitus, type 2     Disc degeneration, lumbar     Headache, tension-type     Hyperlipidemia     Hypothyroidism     Neuropathy     Osteoporosis 09/09/2015    Pancreatic mass     Sept 2023, on Monthly Octreotide Injection    Peripheral neuropathy     Rotator cuff tear, left     Scoliosis     Shoulder pain     LEFT, TORN ROTATOR CUFF S/P FALL    Spinal stenosis      Past Surgical History:   Procedure Laterality Date    APPENDECTOMY      BILATERAL BREAST REDUCTION Bilateral 08/2015    CATARACT EXTRACTION  03/2015    CHOLECYSTECTOMY WITH INTRAOPERATIVE CHOLANGIOGRAM N/A 3/27/2022    Procedure: CHOLECYSTECTOMY LAPAROSCOPIC INTRAOPERATIVE CHOLANGIOGRAM;  Surgeon: Aiyana Hill MD;  Location: [facility] MAIN OR;  Service: General;  Laterality: N/A;    COLONOSCOPY  06/05/2015    WNL    ERCP N/A 2/25/2022    Procedure: ENDOSCOPIC RETROGRADE CHOLANGIOPANCREATOGRAPHY with sphincterotomy and balloon sweep;  Surgeon: Chilo Wilhelm MD;  Location: [facility] ENDOSCOPY;  Service: Gastroenterology;   Laterality: N/A;  PRE/POST - CBD stones    ERCP N/A 3/28/2022    Procedure: ENDOSCOPIC RETROGRADE CHOLANGIOPANCREATOGRAPHY WITH SPHINCTEROTOMY AND BALLOON SWEEP;  Surgeon: Chilo Wilhelm MD;  Location: [facility] ENDOSCOPY;  Service: Gastroenterology;  Laterality: N/A;  PRE: COMMON DUCT STONE  POST: COMMON DUCT STONE    KYPHOPLASTY      REDUCTION MAMMAPLASTY      TONSILLECTOMY        General Information       Row Name 10/16/24 1536          Physical Therapy Time and Intention    Document Type therapy note (daily note)  -EM (r) DG (t) EM (c)     Mode of Treatment individual therapy;physical therapy  -EM (r) DG (t) EM (c)       Row Name 10/16/24 1536          General Information    Patient Profile Reviewed yes  -EM (r) DG (t) EM (c)     Existing Precautions/Restrictions fall  -EM (r) DG (t) EM (c)               User Key  (r) = Recorded By, (t) = Taken By, (c) = Cosigned By      Initials Name Provider Type    EM Nelly Serrano, PT Physical Therapist    Elijah Muro PT Student PT Student                   Mobility       Row Name 10/16/24 1536          Bed Mobility    Supine-Sit [place] (Bed Mobility) minimum assist (75% patient effort);verbal cues  -EM (r) DG (t) EM (c)     Assistive Device (Bed Mobility) head of bed elevated  -EM (r) DG (t) EM (c)     Comment, (Bed Mobility) Verbal cueing needed to get pt feet on the floor.  -EM (r) DG (t) EM (c)       Row Name 10/16/24 1536          Sit-Stand Transfer    Sit-Stand [place] (Transfers) minimum assist (75% patient effort);moderate assist (50% patient effort);2 person assist;verbal cues  -EM (r) DG (t) EM (c)     Assistive Device (Sit-Stand Transfers) walker, front-wheeled  -EM (r) DG (t) EM (c)     Comment, (Sit-Stand Transfer) Pt performed sit to stand OOB and in recliner w/ Min A x2 and rxw. Needed verbal cueing for hand placement on walker. Stands with a very flexed forward posture. Unable to achieve full standing position.  -EM (r) DG (t) EM  (c)       Row Name 10/16/24 1536          Gait/Stairs (Locomotion)    [place] Level (Gait) minimum assist (75% patient effort);2 person assist;verbal cues  -EM (r) DG (t) EM (c)     Assistive Device (Gait) walker, front-wheeled  -EM (r) DG (t) EM (c)     Patient was able to Ambulate yes  -EM (r) DG (t) EM (c)     Distance in Feet (Gait) 7  -EM (r) DG (t) EM (c)     Deviations/Abnormal Patterns (Gait) stride length decreased;gait speed decreased;festinating/shuffling;base of support, narrow;tess decreased;weight shifting decreased  -EM (r) DG (t) EM (c)     Bilateral Gait Deviations forward flexed posture  -EM (r) DG (t) EM (c)     Right Sided Gait Deviations foot drop/toe drag  -EM (r) DG (t) EM (c)     Comment, (Gait/Stairs) needed tactile cueing for walker management. Was taking extremely short steps with R foot toe drag. Very slow gait speed. Flexed forward posture. fatigued quickly during ambulation and needed to sit in recliner. Originally wanted to get back to bed.  -EM (r) DG (t) EM (c)               User Key  (r) = Recorded By, (t) = Taken By, (c) = Cosigned By      Initials Name Provider Type    Nelly Fong, PT Physical Therapist    Elijah Muro PT Student PT Student                   Obj/Interventions       Row Name 10/16/24 1541          Motor Skills    Therapeutic Exercise --  Performed ankle pumps x10e reps and LAQ x10e reps.  -EM (r) DG (t) EM (c)       Row Name 10/16/24 1541          Balance    Balance Assessment sitting static balance;sitting dynamic balance;standing static balance;standing dynamic balance  -EM (r) DG (t) EM (c)     Static Sitting Balance supervision  -EM (r) DG (t) EM (c)     Dynamic Sitting Balance supervision  -EM (r) DG (t) EM (c)     Position, Sitting Balance sitting edge of bed;unsupported  -EM (r) DG (t) EM (c)     Static Standing Balance contact guard;verbal cues  -EM (r) DG (t) EM (c)     Dynamic Standing Balance minimal assist;2-person assist;verbal  "cues;non-verbal cues (demo/gesture)  -EM (r) DG (t) EM (c)     Position/Device Used, Standing Balance walker, front-wheeled  -EM (r) DG (t) EM (c)     Comment, Balance Forward flexed posture, unable to achieve full standing position. Very unsteady.  -EM (r) DG (t) EM (c)               User Key  (r) = Recorded By, (t) = Taken By, (c) = Cosigned By      Initials Name Provider Type    EM Nelly Serrano, PT Physical Therapist    Elijah Muro, PT Student PT Student                   Goals/Plan    No documentation.                  Clinical Impression       Row Name 10/16/24 154          Pain    Pre/Posttreatment Pain Comment C/o of generalized pain, did not rate using NPRS.  -EM (r) DG (t) EM (c)       Row Name 10/16/24 1544          Plan of Care Review    Plan of Care Reviewed With patient  -EM (r) DG (t) EM (c)     Outcome Evaluation Pt was awake, alert and agreeable to PT this PM. Pt had c/o of generalized pain and was able to follow commands. Pt performed bed mobility supine-sit w/ Min A, sit to stand w/ Min A x2 and rxw, ambulated 7' w/ Min A x2 and rxw. Pt states \"I'm not go to rehab so I better start walking\". Pt demostrates having very short step pattern almost similiar to marching in place and forward flexed posture. Pt had increased fatigue with activity and required to sit down during mid walk to room door. Pt needed tactile cueing for walker management. PT will continue to follow. DC recommendation is SNF.  -EM (r) DG (t) EM (c)       Row Name 10/16/24 1548          Vital Signs    O2 Delivery Pre Treatment room air  -EM (r) DG (t) EM (c)     O2 Delivery Intra Treatment room air  -EM (r) DG (t) EM (c)     O2 Delivery Post Treatment room air  -EM (r) DG (t) EM (c)       Row Name 10/16/24 1542          Positioning and Restraints    Pre-Treatment Position in bed  -EM (r) DG (t) EM (c)     Post Treatment Position chair  -EM (r) DG (t) EM (c)     In Chair reclined;sitting;call light within " reach;encouraged to call for assist  -EM (r) DG (t) EM (c)               User Key  (r) = Recorded By, (t) = Taken By, (c) = Cosigned By      Initials Name Provider Type    Nelly Fong, PT Physical Therapist    Elijah Muro, PT Student PT Student                   Outcome Measures       Row Name 10/16/24 1549          How much help from another person do you currently need...    Turning from your back to your side while in flat bed without using bedrails? 3  -EM (r) DG (t) EM (c)     Moving from lying on back to sitting on the side of a flat bed without bedrails? 3  -EM (r) DG (t) EM (c)     Moving to and from a bed to a chair (including a wheelchair)? 2  -EM (r) DG (t) EM (c)     Standing up from a chair using your arms (e.g., wheelchair, bedside chair)? 2  -EM (r) DG (t) EM (c)     Climbing 3-5 steps with a railing? 1  -EM (r) DG (t) EM (c)     To walk in hospital room? 2  -EM (r) DG (t) EM (c)     AM-PAC 6 Clicks Score (PT) 13  -EM (r) DG (t)     Highest Level of Mobility Goal 4 --> Transfer to chair/commode  -EM (r) DG (t)       Row Name 10/16/24 1549          Functional Assessment    Outcome Measure Options AM-PAC 6 Clicks Basic Mobility (PT)  -EM (r) DG (t) EM (c)               User Key  (r) = Recorded By, (t) = Taken By, (c) = Cosigned By      Initials Name Provider Type    Nelly Fong, PT Physical Therapist    Elijah Muro, PT Student PT Student                                 Physical Therapy Education       Title: PT OT SLP Therapies (Done)       Topic: Physical Therapy (Done)       Point: Mobility training (Done)       Learning Progress Summary            Patient Acceptance, E, VU,NR by RIKKI at 10/16/2024 1550    Acceptance, E, VU by EM at 10/15/2024 1408    Acceptance, E,D, DU,NR by PC at 10/14/2024 1714    Acceptance, E,D, NR by JR at 10/13/2024 1505    Acceptance, E, VU by MR at 10/12/2024 1831                      Point: Home exercise program (Done)       Learning Progress  "Summary            Patient Acceptance, E, VU,NR by DG at 10/16/2024 1550    Acceptance, E,D, DU,NR by PC at 10/14/2024 1714    Acceptance, E,D, NR by JR at 10/13/2024 1505    Acceptance, E, VU by MR at 10/12/2024 1831                      Point: Body mechanics (Done)       Learning Progress Summary            Patient Acceptance, E, VU,NR by DG at 10/16/2024 1550    Acceptance, E,D, DU,NR by PC at 10/14/2024 1714    Acceptance, E,D, NR by JR at 10/13/2024 1505    Acceptance, E, VU by MR at 10/12/2024 1831                      Point: Precautions (Done)       Learning Progress Summary            Patient Acceptance, E, VU,NR by DG at 10/16/2024 1550    Acceptance, E,D, DU,NR by PC at 10/14/2024 1714    Acceptance, E,D, NR by JR at 10/13/2024 1505    Acceptance, E, VU by MR at 10/12/2024 1831                                      User Key       Initials Effective Dates Name Provider Type Discipline     06/16/21 -  Lenora Menjivar, PT Physical Therapist PT    EM 06/16/21 -  Nelly Serrano, PT Physical Therapist PT     06/16/21 -  Ulisses Warren, PT Physical Therapist PT    MR 08/03/23 -  Clarisse Chahal, RN Registered Nurse Nurse     08/22/24 -  Elijah Piper PT Student PT Student PT                  PT Recommendation and Plan     Outcome Evaluation: Pt was awake, alert and agreeable to PT this PM. Pt had c/o of generalized pain and was able to follow commands. Pt performed bed mobility supine-sit w/ Min A, sit to stand w/ Min A x2 and rxw, ambulated 7' w/ Min A x2 and rxw. Pt states \"I'm not go to rehab so I better start walking\". Pt demostrates having very short step pattern almost similiar to marching in place and forward flexed posture. Pt had increased fatigue with activity and required to sit down during mid walk to room door. Pt needed tactile cueing for walker management. PT will continue to follow. DC recommendation is SNF.     Time Calculation:         PT Charges       Row Name 10/16/24 1550       "       Time Calculation    Start Time 1447  -EM (r) DG (t) EM (c)      Stop Time 1511  -EM (r) DG (t) EM (c)      Time Calculation (min) 24 min  -EM (r) DG (t)      PT Received On 10/16/24  -EM (r) DG (t) EM (c)      PT - Next Appointment 10/17/24  -EM (r) DG (t) EM (c)         Time Calculation- PT    Total Timed Code Minutes- PT 24 minute(s)  -EM (r) DG (t) EM (c)         Timed Charges    71979 - PT Therapeutic Exercise Minutes 10  -EM (r) DG (t) EM (c)      09170 - PT Therapeutic Activity Minutes 14  -EM (r) DG (t) EM (c)         Total Minutes    Timed Charges Total Minutes 24  -EM (r) DG (t)       Total Minutes 24  -EM (r) DG (t)                User Key  (r) = Recorded By, (t) = Taken By, (c) = Cosigned By      Initials Name Provider Type    Nelly Fong, PT Physical Therapist    Elijah Muro, PT Student PT Student                  Therapy Charges for Today       Code Description Service Date Service Provider Modifiers Qty    24625523947 HC PT THER PROC EA 15 MIN 10/16/2024 Elijah Piper, PT Student GP 1    43765828454 HC PT THERAPEUTIC ACT EA 15 MIN 10/16/2024 Elijah Piper PT Student GP 1            PT G-Codes  Outcome Measure Options: AM-PAC 6 Clicks Basic Mobility (PT)  AM-PAC 6 Clicks Score (PT): 13       Elijah Piper PT Student  10/16/2024

## 2024-10-16 NOTE — CASE MANAGEMENT/SOCIAL WORK
Post-Acute Authorization Submission      Post Acute Pre-Cert Documentation  Request Submitted by Facility - Type:: Hospital  Post-Acute Authorization Type Submitted:: SNF  Date Post Acute Pre-Cert Inititated per Facility: 10/16/24  Accepting Facility: [facility] Discharge Date Requested: 10/17/24  All Clinicals Submitted?: Yes  Had Accepting Facility at Time of Submission: Yes  Response Communicated to:: , Accepting Facility Liaison  Authorization Number:: PENDING [id number]              JESSICA Hernandes

## 2024-10-16 NOTE — PROGRESS NOTES
[facility] Progress Note      Patient Name: Halie Guidry  : 1940  MRN: [id number]  Primary Care Physician:  Napoleon Mead MD  Date of admission: 10/10/2024    Subjective     Interval History:   Follow-up acute kidney injury on chronic kidney    The patient is complaining of leg pain, denies any chest pain or shortness of air, no orthopnea or PND, no nausea or vomiting.    Review of Systems:   As noted above    Objective     Vitals:   Temp:  [97.6 °F (36.4 °C)-97.8 °F (36.6 °C)] 97.6 °F (36.4 °C)  Heart Rate:  [68-84] 68  Resp:  [10-18] 10  BP: (133-166)/(55-71) 166/58  Flow (L/min) (Oxygen Therapy):  [2] 2    Intake/Output Summary (Last 24 hours) at 10/16/2024 1039  Last data filed at 10/15/2024 2100  Gross per 24 hour   Intake 1170 ml   Output 300 ml   Net 870 ml       Physical Exam:    General Appearance: alert, awake, chronically ill, no acute distress   Skin: warm and dry  HEENT: oral mucosa normal, nonicteric sclera  Neck: No JVD  Lungs: Clear to auscultation, unlabored breathing effort  Heart: RRR, no rub  Abdomen: soft, nontender, nondistended  : no palpable bladder  Extremities: Brawny BLE edema with warmth and erythema, presumed ulcerations to bilateral heels (covered with dressing), no clubbing or cyanosis   Neuro: normal speech and mental status     Scheduled Meds:     atorvastatin, 80 mg, Oral, Nightly  bisoprolol, 5 mg, Oral, Daily  castor oil-balsam peru, 1 Application, Topical, Q12H  DULoxetine, 30 mg, Oral, Daily  enoxaparin, 30 mg, Subcutaneous, Q24H  ferrous sulfate, 325 mg, Oral, Every Other Day  hydrALAZINE, 50 mg, Oral, Q8H  insulin glargine, 50 Units, Subcutaneous, Daily  insulin lispro, 2-9 Units, Subcutaneous, 4x Daily AC & at Bedtime  levothyroxine, 137 mcg, Oral, Q AM  Menthol-Zinc Oxide, 1 Application, Topical, Q12H  mupirocin, 1 Application, Each Nare, BID  pantoprazole, 40 mg, Oral, Q AM  pregabalin, 100 mg, Oral, BID  saccharomyces  boulardii, 500 mg, Oral, BID  sodium bicarbonate, 1,300 mg, Oral, TID  terazosin, 1 mg, Oral, Nightly      IV Meds:        Results Reviewed:   I have personally reviewed the results from the time of this admission to 10/16/2024 10:39 EDT     Results from last 7 days   Lab Units 10/16/24  0435 10/15/24  0546 10/14/24  1649 10/14/24  0714 10/13/24  0416 10/12/24  0550   SODIUM mmol/L 130* 132* 137   < > 134* 136   POTASSIUM mmol/L 4.9 4.8 5.0   < > 4.7 4.6   CHLORIDE mmol/L 97* 99 98   < > 99 107   CO2 mmol/L 20.8* 21.3* 20.7*   < > 20.4* 18.3*   BUN mg/dL 85* 89* 86*   < > 72* 68*   CREATININE mg/dL 3.07* 3.30* 3.24*   < > 2.37* 2.52*   CALCIUM mg/dL 8.6 8.2* 8.6   < > 8.5* 8.4*   BILIRUBIN mg/dL  --  0.2  --   --  <0.2 0.2   ALK PHOS U/L  --  124*  --   --  131* 131*   ALT (SGPT) U/L  --  5  --   --  5 <5   AST (SGOT) U/L  --  9  --   --  8 8   GLUCOSE mg/dL 241* 111* 100*   < > 234* 256*    < > = values in this interval not displayed.       Estimated Creatinine Clearance: 16.8 mL/min (A) (by C-G formula based on SCr of 3.07 mg/dL (H)).    Results from last 7 days   Lab Units 10/16/24  0435 10/15/24  0546 10/14/24  0714   MAGNESIUM mg/dL 1.8 1.8 1.7   PHOSPHORUS mg/dL 7.0* 6.8* 7.1*       Results from last 7 days   Lab Units 10/16/24  0435 10/15/24  0546 10/14/24  0714 10/13/24  0416   URIC ACID mg/dL 11.6* 12.1* 11.6* 11.0*       Results from last 7 days   Lab Units 10/16/24  0435 10/15/24  0547 10/14/24  0714 10/13/24  0416 10/12/24  0550   WBC 10*3/mm3 7.43 9.36 8.72 9.49 9.30   HEMOGLOBIN g/dL 8.9* 10.1* 9.9* 9.6* 9.4*   PLATELETS 10*3/mm3 299 304 321 350 321             Assessment / Plan     ASSESSMENT:  Acute kidney injury vs. Progression of chronic kidney disease stage IV- Patient creatinine up until April of this year was previously running ~1.5 +/- but unfortunately after her hospitalization at that time for VIPUL and hyperglycemia her kidney function did not recover to baseline. For the last two months her  creatinine has been running 2.2-2.6 mg/dl. Etiology is likely progression of her kidney disease secondary to poorly controlled diabetes. 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 Hgb A1c 10.3 on admission. Patient states she lives in assisted living but does not receive medication assistance. She reports very poor understanding of her disease management. Will defer treatment and education to primary but did explain that this is likely the cause of her kidney disease progression and stressed the importance of getting her glucose under control.   Metabolic acidosis- Co2 is 28.8,today.  On oral sodium bicarb  Bilateral lower extremity cellulitis- Treatment per primary team  UTI- U treated.  History of hypertension-blood pressure is reasonably controlled  Anemia in chronic kidney disease- Hgb today 8 point g/dl, improving. Unfortunately while she is being treated for active infection she would not benefit from IV iron replacement or KHUSHBU.  Hyperuricemia straight with decreased effective intravascular    PLAN:  Continue the same treatment  No diuretics today we will reassess in the next 24 hours and start oral diuretics  Surveillance labs      I reviewed the chart and other providers notes, reviewed labs.  Discussed the case with the patient and she voiced good understanding  Copied text in this note has been reviewed and is accurate as of 10/16/24.       Thank you for involving us in the care of Halie Guidry.  Please feel free to call with any questions.    Chidi Lanier MD  10/16/24  10:39 EDT    [facility]  [phone]    Please note that portions of this note were completed with a voice recognition program.

## 2024-10-17 LAB
ALBUMIN SERPL-MCNC: 3.2 G/DL (ref 3.5–5.2)
ANION GAP SERPL CALCULATED.3IONS-SCNC: 12.6 MMOL/L (ref 5–15)
BASOPHILS # BLD AUTO: 0.07 10*3/MM3 (ref 0–0.2)
BASOPHILS NFR BLD AUTO: 0.8 % (ref 0–1.5)
BUN SERPL-MCNC: 78 MG/DL (ref 8–23)
BUN/CREAT SERPL: 29.9 (ref 7–25)
CALCIUM SPEC-SCNC: 8.6 MG/DL (ref 8.6–10.5)
CHLORIDE SERPL-SCNC: 99 MMOL/L (ref 98–107)
CO2 SERPL-SCNC: 22.4 MMOL/L (ref 22–29)
CREAT SERPL-MCNC: 2.61 MG/DL (ref 0.57–1)
DEPRECATED RDW RBC AUTO: 49.5 FL (ref 37–54)
EGFRCR SERPLBLD CKD-EPI 2021: 17.6 ML/MIN/1.73
EOSINOPHIL # BLD AUTO: 0.34 10*3/MM3 (ref 0–0.4)
EOSINOPHIL NFR BLD AUTO: 3.9 % (ref 0.3–6.2)
ERYTHROCYTE [DISTWIDTH] IN BLOOD BY AUTOMATED COUNT: 15.9 % (ref 12.3–15.4)
GLUCOSE BLDC GLUCOMTR-MCNC: 123 MG/DL (ref 70–130)
GLUCOSE BLDC GLUCOMTR-MCNC: 129 MG/DL (ref 70–130)
GLUCOSE BLDC GLUCOMTR-MCNC: 184 MG/DL (ref 70–130)
GLUCOSE BLDC GLUCOMTR-MCNC: 247 MG/DL (ref 70–130)
GLUCOSE BLDC GLUCOMTR-MCNC: 247 MG/DL (ref 70–130)
GLUCOSE SERPL-MCNC: 258 MG/DL (ref 65–99)
HCT VFR BLD AUTO: 27.6 % (ref 34–46.6)
HGB BLD-MCNC: 9.2 G/DL (ref 12–15.9)
IMM GRANULOCYTES # BLD AUTO: 0.04 10*3/MM3 (ref 0–0.05)
IMM GRANULOCYTES NFR BLD AUTO: 0.5 % (ref 0–0.5)
LYMPHOCYTES # BLD AUTO: 0.69 10*3/MM3 (ref 0.7–3.1)
LYMPHOCYTES NFR BLD AUTO: 7.9 % (ref 19.6–45.3)
MAGNESIUM SERPL-MCNC: 1.9 MG/DL (ref 1.6–2.4)
MCH RBC QN AUTO: 28.6 PG (ref 26.6–33)
MCHC RBC AUTO-ENTMCNC: 33.3 G/DL (ref 31.5–35.7)
MCV RBC AUTO: 85.7 FL (ref 79–97)
MONOCYTES # BLD AUTO: 0.72 10*3/MM3 (ref 0.1–0.9)
MONOCYTES NFR BLD AUTO: 8.2 % (ref 5–12)
NEUTROPHILS NFR BLD AUTO: 6.92 10*3/MM3 (ref 1.7–7)
NEUTROPHILS NFR BLD AUTO: 78.7 % (ref 42.7–76)
NRBC BLD AUTO-RTO: 0 /100 WBC (ref 0–0.2)
PHOSPHATE SERPL-MCNC: 5.9 MG/DL (ref 2.5–4.5)
PLATELET # BLD AUTO: 289 10*3/MM3 (ref 140–450)
PMV BLD AUTO: 9.7 FL (ref 6–12)
POTASSIUM SERPL-SCNC: 5.3 MMOL/L (ref 3.5–5.2)
RBC # BLD AUTO: 3.22 10*6/MM3 (ref 3.77–5.28)
SODIUM SERPL-SCNC: 134 MMOL/L (ref 136–145)
URATE SERPL-MCNC: 11.6 MG/DL (ref 2.4–5.7)
WBC NRBC COR # BLD AUTO: 8.78 10*3/MM3 (ref 3.4–10.8)

## 2024-10-17 PROCEDURE — 82948 REAGENT STRIP/BLOOD GLUCOSE: CPT

## 2024-10-17 PROCEDURE — 83735 ASSAY OF MAGNESIUM: CPT | Performed by: STUDENT IN AN ORGANIZED HEALTH CARE EDUCATION/TRAINING PROGRAM

## 2024-10-17 PROCEDURE — 97110 THERAPEUTIC EXERCISES: CPT

## 2024-10-17 PROCEDURE — 84550 ASSAY OF BLOOD/URIC ACID: CPT | Performed by: INTERNAL MEDICINE

## 2024-10-17 PROCEDURE — 63710000001 INSULIN GLARGINE PER 5 UNITS: Performed by: STUDENT IN AN ORGANIZED HEALTH CARE EDUCATION/TRAINING PROGRAM

## 2024-10-17 PROCEDURE — 63710000001 INSULIN LISPRO (HUMAN) PER 5 UNITS: Performed by: STUDENT IN AN ORGANIZED HEALTH CARE EDUCATION/TRAINING PROGRAM

## 2024-10-17 PROCEDURE — 63710000001 INSULIN LISPRO (HUMAN) PER 5 UNITS: Performed by: NURSE PRACTITIONER

## 2024-10-17 PROCEDURE — 25010000002 ENOXAPARIN PER 10 MG: Performed by: HOSPITALIST

## 2024-10-17 PROCEDURE — 80069 RENAL FUNCTION PANEL: CPT | Performed by: INTERNAL MEDICINE

## 2024-10-17 PROCEDURE — 85025 COMPLETE CBC W/AUTO DIFF WBC: CPT | Performed by: INTERNAL MEDICINE

## 2024-10-17 RX ORDER — ROPINIROLE 0.5 MG/1
0.25 TABLET, FILM COATED ORAL 3 TIMES DAILY
Status: DISCONTINUED | OUTPATIENT
Start: 2024-10-17 | End: 2024-10-19

## 2024-10-17 RX ORDER — INSULIN LISPRO 100 [IU]/ML
8 INJECTION, SOLUTION INTRAVENOUS; SUBCUTANEOUS
Status: DISCONTINUED | OUTPATIENT
Start: 2024-10-17 | End: 2024-10-18

## 2024-10-17 RX ORDER — HYDROCODONE BITARTRATE AND ACETAMINOPHEN 10; 325 MG/1; MG/1
1 TABLET ORAL EVERY 6 HOURS PRN
Status: COMPLETED | OUTPATIENT
Start: 2024-10-17 | End: 2024-10-18

## 2024-10-17 RX ADMIN — LEVOTHYROXINE SODIUM 137 MCG: 137 TABLET ORAL at 06:48

## 2024-10-17 RX ADMIN — INSULIN LISPRO 2 UNITS: 100 INJECTION, SOLUTION INTRAVENOUS; SUBCUTANEOUS at 20:30

## 2024-10-17 RX ADMIN — Medication 500 MG: at 20:30

## 2024-10-17 RX ADMIN — BISOPROLOL FUMARATE 5 MG: 5 TABLET, FILM COATED ORAL at 08:40

## 2024-10-17 RX ADMIN — ANORECTAL OINTMENT 1 APPLICATION: 15.7; .44; 24; 20.6 OINTMENT TOPICAL at 08:45

## 2024-10-17 RX ADMIN — INSULIN GLARGINE 50 UNITS: 100 INJECTION, SOLUTION SUBCUTANEOUS at 08:44

## 2024-10-17 RX ADMIN — Medication 500 MG: at 08:40

## 2024-10-17 RX ADMIN — INSULIN LISPRO 5 UNITS: 100 INJECTION, SOLUTION INTRAVENOUS; SUBCUTANEOUS at 11:57

## 2024-10-17 RX ADMIN — HYDROCODONE BITARTRATE AND ACETAMINOPHEN 1 TABLET: 10; 325 TABLET ORAL at 04:30

## 2024-10-17 RX ADMIN — FERROUS SULFATE TAB 325 MG (65 MG ELEMENTAL FE) 325 MG: 325 (65 FE) TAB at 08:40

## 2024-10-17 RX ADMIN — TERAZOSIN 1 MG: 1 CAPSULE ORAL at 20:29

## 2024-10-17 RX ADMIN — PANTOPRAZOLE SODIUM 40 MG: 40 TABLET, DELAYED RELEASE ORAL at 06:48

## 2024-10-17 RX ADMIN — SODIUM BICARBONATE 1300 MG: 650 TABLET ORAL at 08:40

## 2024-10-17 RX ADMIN — ATORVASTATIN CALCIUM 80 MG: 80 TABLET, FILM COATED ORAL at 20:29

## 2024-10-17 RX ADMIN — ACETAMINOPHEN 325MG 650 MG: 325 TABLET ORAL at 08:43

## 2024-10-17 RX ADMIN — HYDRALAZINE HYDROCHLORIDE 50 MG: 50 TABLET ORAL at 17:35

## 2024-10-17 RX ADMIN — ANORECTAL OINTMENT 1 APPLICATION: 15.7; .44; 24; 20.6 OINTMENT TOPICAL at 20:30

## 2024-10-17 RX ADMIN — INSULIN LISPRO 4 UNITS: 100 INJECTION, SOLUTION INTRAVENOUS; SUBCUTANEOUS at 08:44

## 2024-10-17 RX ADMIN — PREGABALIN 100 MG: 75 CAPSULE ORAL at 20:30

## 2024-10-17 RX ADMIN — ROPINIROLE HYDROCHLORIDE 0.25 MG: 0.5 TABLET, FILM COATED ORAL at 20:29

## 2024-10-17 RX ADMIN — Medication 2.5 MG: at 02:14

## 2024-10-17 RX ADMIN — HYDRALAZINE HYDROCHLORIDE 50 MG: 50 TABLET ORAL at 06:48

## 2024-10-17 RX ADMIN — ROPINIROLE HYDROCHLORIDE 0.25 MG: 0.5 TABLET, FILM COATED ORAL at 17:37

## 2024-10-17 RX ADMIN — SODIUM BICARBONATE 1300 MG: 650 TABLET ORAL at 17:35

## 2024-10-17 RX ADMIN — DULOXETINE HYDROCHLORIDE 30 MG: 30 CAPSULE, DELAYED RELEASE ORAL at 08:40

## 2024-10-17 RX ADMIN — PREGABALIN 100 MG: 75 CAPSULE ORAL at 08:41

## 2024-10-17 RX ADMIN — SODIUM BICARBONATE 1300 MG: 650 TABLET ORAL at 20:30

## 2024-10-17 RX ADMIN — ACETAMINOPHEN 325MG 650 MG: 325 TABLET ORAL at 02:14

## 2024-10-17 RX ADMIN — INSULIN LISPRO 4 UNITS: 100 INJECTION, SOLUTION INTRAVENOUS; SUBCUTANEOUS at 11:57

## 2024-10-17 RX ADMIN — ENOXAPARIN SODIUM 30 MG: 100 INJECTION SUBCUTANEOUS at 17:39

## 2024-10-17 RX ADMIN — CASTOR OIL AND BALSAM, PERU 1 APPLICATION: 788; 87 OINTMENT TOPICAL at 20:30

## 2024-10-17 RX ADMIN — INSULIN LISPRO 5 UNITS: 100 INJECTION, SOLUTION INTRAVENOUS; SUBCUTANEOUS at 08:44

## 2024-10-17 RX ADMIN — CASTOR OIL AND BALSAM, PERU 1 APPLICATION: 788; 87 OINTMENT TOPICAL at 08:43

## 2024-10-17 RX ADMIN — HYDROCODONE BITARTRATE AND ACETAMINOPHEN 1 TABLET: 10; 325 TABLET ORAL at 11:57

## 2024-10-17 NOTE — THERAPY TREATMENT NOTE
Patient Name: Halie Guidry  : 1940    MRN: [id number]                              Today's Date: 10/17/2024       Admit Date: 10/10/2024    Visit Dx:     ICD-10-CM ICD-9-CM   1. Acute UTI  N39.0 599.0   2. Type 2 diabetes mellitus with hyperglycemia, with long-term current use of insulin  E11.65 250.00    Z79.4 790.29     V58.67   3. Poorly controlled diabetes mellitus  E11.65 250.00   4. Venous stasis dermatitis of both lower extremities  I87.2 454.1   5. Chronic renal impairment, unspecified CKD stage  N18.9 585.9   6. Decreased mobility and endurance  Z74.09 780.99     Patient Active Problem List   Diagnosis    Compression fracture    Lumbar degenerative disc disease    Type 2 diabetes mellitus with hyperglycemia, with long-term current use of insulin    Hyperlipidemia    Benign essential hypertension    Primary hypothyroidism    Neuropathy    Osteoporosis    Proteinuria    Tobacco abuse    Generalized weakness    Spinal stenosis    Scoliosis    Arthritis    VBI (vertebrobasilar insufficiency)    Orthostatic hypotension    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    Severe hypothyroidism    Noncompliance with medication regimen    Vitamin D deficiency disease    Tremor    Seizure    Thoracic degenerative disc disease    Acute kidney injury (VIPUL) with acute tubular necrosis (ATN)    Hyperglycemia    Type II diabetes mellitus, uncontrolled    Lower abdominal pain    Transaminitis    Emphysematous cystitis    Choledocholithiasis    Hypokalemia    Hypoxia    Generalized abdominal pain    History of Clostridium difficile infection    History of ERCP    Hypomagnesemia    Anxiety disorder    Neuropathic pain    Intertrigo    Weakness of both lower extremities    Accelerated hypertension    Acute cystitis without hematuria    Left lower lobe pneumonia    Cellulitis and abscess of left lower extremity    Benign hypertension with CKD (chronic kidney disease) stage IV    Peripheral edema     Primary malignant neuroendocrine tumor of pancreas    Encounter for long-term (current) use of high-risk medication    Diabetic foot ulcer    Stage 3a chronic kidney disease    Pressure injury of buttock, stage 2    HTN (hypertension)    Anemia due to chronic kidney disease    Bilateral lower extremity edema    Cellulitis of right lower extremity    CKD (chronic kidney disease) stage 4, GFR 15-29 ml/min    Acute CHF    Bilateral cellulitis of lower leg    Acute UTI    UTI (urinary tract infection)    Acute UTI (urinary tract infection)    Metabolic acidosis    Cobalamin deficiency    Dependent edema    Gastroesophageal reflux disease    Mild neurocognitive disorder    Pancreatic neoplasm    Congestive heart failure     Past Medical History:   Diagnosis Date    Acute metabolic encephalopathy 06/24/2022    Anxiety     Arthritis     Benign essential hypertension 08/20/2014    Bleeding disorder     Depression     Diabetes     Diabetes mellitus, type 2     Disc degeneration, lumbar     Headache, tension-type     Hyperlipidemia     Hypothyroidism     Neuropathy     Osteoporosis 09/09/2015    Pancreatic mass     Sept 2023, on Monthly Octreotide Injection    Peripheral neuropathy     Rotator cuff tear, left     Scoliosis     Shoulder pain     LEFT, TORN ROTATOR CUFF S/P FALL    Spinal stenosis      Past Surgical History:   Procedure Laterality Date    APPENDECTOMY      BILATERAL BREAST REDUCTION Bilateral 08/2015    CATARACT EXTRACTION  03/2015    CHOLECYSTECTOMY WITH INTRAOPERATIVE CHOLANGIOGRAM N/A 3/27/2022    Procedure: CHOLECYSTECTOMY LAPAROSCOPIC INTRAOPERATIVE CHOLANGIOGRAM;  Surgeon: Aiyana Hill MD;  Location: [facility] MAIN OR;  Service: General;  Laterality: N/A;    COLONOSCOPY  06/05/2015    WNL    ERCP N/A 2/25/2022    Procedure: ENDOSCOPIC RETROGRADE CHOLANGIOPANCREATOGRAPHY with sphincterotomy and balloon sweep;  Surgeon: Chilo Wilhelm MD;  Location: [facility] ENDOSCOPY;  Service: Gastroenterology;   Laterality: N/A;  PRE/POST - CBD stones    ERCP N/A 3/28/2022    Procedure: ENDOSCOPIC RETROGRADE CHOLANGIOPANCREATOGRAPHY WITH SPHINCTEROTOMY AND BALLOON SWEEP;  Surgeon: Chilo Wilhelm MD;  Location: [facility] ENDOSCOPY;  Service: Gastroenterology;  Laterality: N/A;  PRE: COMMON DUCT STONE  POST: COMMON DUCT STONE    KYPHOPLASTY      REDUCTION MAMMAPLASTY      TONSILLECTOMY        General Information       Row Name 10/17/24 1443          Physical Therapy Time and Intention    Document Type therapy note (daily note)  -EM (r) DG (t) EM (c)     Mode of Treatment individual therapy;physical therapy  -EM (r) DG (t) EM (c)       Row Name 10/17/24 1443          General Information    Patient Profile Reviewed yes  -EM (r) DG (t) EM (c)     Existing Precautions/Restrictions fall  -EM (r) DG (t) EM (c)               User Key  (r) = Recorded By, (t) = Taken By, (c) = Cosigned By      Initials Name Provider Type    Nelly Fong, PT Physical Therapist    Elijah Muro, PT Student PT Student                   Mobility       Row Name 10/17/24 144          Bed Mobility    Scooting/Bridging [place] (Bed Mobility) not tested  -EM (r) DG (t) EM (c)     Comment, (Bed Mobility) Not tested due to patient having extreme kinsey LE pain.  -EM (r) DG (t) EM (c)               User Key  (r) = Recorded By, (t) = Taken By, (c) = Cosigned By      Initials Name Provider Type    Nelly Fong, PT Physical Therapist    Elijah Muro, PT Student PT Student                   Obj/Interventions       Row Name 10/17/24 1444          Motor Skills    Therapeutic Exercise --  In bed, performed ankle pumps x10e reps, heel slides x10e reps, bicep curls x10e reps and shoulder elevation x10R reps.  -EM (r) DG (t) EM (c)       Row Name 10/17/24 1444          Balance    Balance Assessment --  -EM (r) DG (t) EM (c)     Comment, Balance Unable to assess due to pt pain levels.  -EM (r) DG (t) EM (c)               User Key  (r) =  "Recorded By, (t) = Taken By, (c) = Cosigned By      Initials Name Provider Type    EM Nelly Serrano, PT Physical Therapist    Elijah Muro, MELODIE Student PT Student                   Goals/Plan    No documentation.                  Clinical Impression       Row Name 10/17/24 1445          Pain    Pretreatment Pain Rating 10/10  -EM (r) DG (t) EM (c)     Posttreatment Pain Rating 10/10  -EM (r) DG (t) EM (c)     Pain Location extremity  -EM (r) DG (t) EM (c)     Pain Side/Orientation bilateral  -EM (r) DG (t) EM (c)     Pain Management Interventions positioning techniques utilized;premedicated for activity;exercise or physical activity utilized  -EM (r) DG (t) EM (c)     Pre/Posttreatment Pain Comment Pt states having severe kinsey LE pain rating 10/10.  -EM (r) DG (t) EM (c)       Row Name 10/17/24 1445          Plan of Care Review    Plan of Care Reviewed With patient  -EM (r) DG (t) EM (c)     Outcome Evaluation Pt was awake and agreeable to PT this PM. Pt c/o of 10/10 kinsey LE pain and states she is unable to get up because of it. Pt appears to be extremely lethargic. Pt was given pain medication administered by PAUL Pabon prior to PT. Pt was able to follow commands with additional verbal cueing. Pt performed bed level exercises with all 4 extremities. Pt expresses having L shoulder stiffness during shoulder elevation exercise. Pt asks \"can I get my blood sugar taken?\" and notified nursing assistant. PT will continue to follow. DC recommendation is SNF.  -EM (r) DG (t) EM (c)       Row Name 10/17/24 1445          Vital Signs    O2 Delivery Pre Treatment room air  -EM (r) DG (t) EM (c)     O2 Delivery Intra Treatment room air  -EM (r) DG (t) EM (c)     O2 Delivery Post Treatment room air  -EM (r) DG (t) EM (c)       Row Name 10/17/24 1445          Positioning and Restraints    Pre-Treatment Position in bed  -EM (r) DG (t) EM (c)     Post Treatment Position bed  -EM (r) DG (t) EM (c)     In Bed notified " nsg;supine;call light within reach;encouraged to call for assist  -EM (r) DG (t) EM (c)               User Key  (r) = Recorded By, (t) = Taken By, (c) = Cosigned By      Initials Name Provider Type    Nelly Fong, PT Physical Therapist    Elijah Muro, PT Student PT Student                   Outcome Measures       Row Name 10/17/24 1451          How much help from another person do you currently need...    Turning from your back to your side while in flat bed without using bedrails? 1  -EM (r) DG (t) EM (c)     Moving from lying on back to sitting on the side of a flat bed without bedrails? 1  -EM (r) DG (t) EM (c)     Moving to and from a bed to a chair (including a wheelchair)? 1  -EM (r) DG (t) EM (c)     Standing up from a chair using your arms (e.g., wheelchair, bedside chair)? 1  -EM (r) DG (t) EM (c)     Climbing 3-5 steps with a railing? 1  -EM (r) DG (t) EM (c)     To walk in hospital room? 1  -EM (r) DG (t) EM (c)     AM-PAC 6 Clicks Score (PT) 6  -EM (r) DG (t)     Highest Level of Mobility Goal 2 --> Bed activities/dependent transfer  -EM (r) DG (t)               User Key  (r) = Recorded By, (t) = Taken By, (c) = Cosigned By      Initials Name Provider Type    Nelly Fong, PT Physical Therapist    Elijah Muro, PT Student PT Student                                 Physical Therapy Education       Title: PT OT SLP Therapies (In Progress)       Topic: Physical Therapy (In Progress)       Point: Mobility training (In Progress)       Learning Progress Summary            Patient Acceptance, E, NR by RIKKI at 10/17/2024 1452    Acceptance, E, VU,NR by DG at 10/16/2024 1550    Acceptance, E, VU by EM at 10/15/2024 1408    Acceptance, E,D, DU,NR by PC at 10/14/2024 1714    Acceptance, E,D, NR by JR at 10/13/2024 1505    Acceptance, E, VU by MR at 10/12/2024 1831                      Point: Home exercise program (In Progress)       Learning Progress Summary            Patient  "Acceptance, E, NR by DG at 10/17/2024 1452    Acceptance, E, VU,NR by DG at 10/16/2024 1550    Acceptance, E,D, DU,NR by PC at 10/14/2024 1714    Acceptance, E,D, NR by JR at 10/13/2024 1505    Acceptance, E, VU by MR at 10/12/2024 1831                      Point: Body mechanics (In Progress)       Learning Progress Summary            Patient Acceptance, E, NR by DG at 10/17/2024 1452    Acceptance, E, VU,NR by DG at 10/16/2024 1550    Acceptance, E,D, DU,NR by PC at 10/14/2024 1714    Acceptance, E,D, NR by JR at 10/13/2024 1505    Acceptance, E, VU by MR at 10/12/2024 1831                      Point: Precautions (In Progress)       Learning Progress Summary            Patient Acceptance, E, NR by DG at 10/17/2024 1452    Acceptance, E, VU,NR by DG at 10/16/2024 1550    Acceptance, E,D, DU,NR by PC at 10/14/2024 1714    Acceptance, E,D, NR by JR at 10/13/2024 1505    Acceptance, E, VU by MR at 10/12/2024 1831                                      User Key       Initials Effective Dates Name Provider Type Discipline     06/16/21 -  Lenora Menjivar, PT Physical Therapist PT    EM 06/16/21 -  Nelly Serrano, PT Physical Therapist PT     06/16/21 -  Ulisses Warren, PT Physical Therapist PT    MR 08/03/23 -  Clarisse Chahal RN Registered Nurse Nurse     08/22/24 -  Elijah Piper PT Student PT Student PT                  PT Recommendation and Plan     Outcome Evaluation: Pt was awake and agreeable to PT this PM. Pt c/o of 10/10 kinsey LE pain and states she is unable to get up because of it. Pt appears to be extremely lethargic. Pt was given pain medication administered by PAUL Pabon prior to PT. Pt was able to follow commands with additional verbal cueing. Pt performed bed level exercises with all 4 extremities. Pt expresses having L shoulder stiffness during shoulder elevation exercise. Pt asks \"can I get my blood sugar taken?\" and notified nursing assistant. PT will continue to follow. DC recommendation is " SNF.     Time Calculation:         PT Charges       Row Name 10/17/24 1452             Time Calculation    Start Time 1403  -EM (r) DG (t) EM (c)      Stop Time 1418  -EM (r) DG (t) EM (c)      Time Calculation (min) 15 min  -EM (r) DG (t)      PT Received On 10/17/24  -EM (r) DG (t) EM (c)      PT - Next Appointment 10/18/24  -EM (r) DG (t) EM (c)         Time Calculation- PT    Total Timed Code Minutes- PT 15 minute(s)  -EM (r) DG (t) EM (c)         Timed Charges    55841 - PT Therapeutic Exercise Minutes 15  -EM (r) DG (t) EM (c)         Total Minutes    Timed Charges Total Minutes 15  -EM (r) DG (t)       Total Minutes 15  -EM (r) DG (t)                User Key  (r) = Recorded By, (t) = Taken By, (c) = Cosigned By      Initials Name Provider Type    EM Nelly Serrano, PT Physical Therapist    Elijah Muro, PT Student PT Student                  Therapy Charges for Today       Code Description Service Date Service Provider Modifiers Qty    18867813647 HC PT THER PROC EA 15 MIN 10/16/2024 Elijah Piper, PT Student GP 1    59252480788 HC PT THERAPEUTIC ACT EA 15 MIN 10/16/2024 Elijah Piper, PT Student GP 1    23802843322 HC PT THER PROC EA 15 MIN 10/17/2024 Elijah Piper, PT Student GP 1            PT G-Codes  Outcome Measure Options: AM-PAC 6 Clicks Basic Mobility (PT)  AM-PAC 6 Clicks Score (PT): 6       Elijah Piper PT Student  10/17/2024

## 2024-10-17 NOTE — CASE MANAGEMENT/SOCIAL WORK
Post-Acute Authorization Submission      Post Acute Pre-Cert Documentation  Request Submitted by Facility - Type:: Hospital  Post-Acute Authorization Type Submitted:: SNF  Date Post Acute Pre-Cert Inititated per Facility: 10/16/24  Accepting Facility: [facility] Discharge Date Requested: 10/17/24  All Clinicals Submitted?: Yes  Had Accepting Facility at Time of Submission: Yes  Response Communicated to::   Authorization Number:: PENDING [id number]              JESSICA Hernandes

## 2024-10-17 NOTE — PROGRESS NOTES
Name: Halie Guidry ADMIT: 10/10/2024   : 1940  PCP: Napoleon Mead MD    MRN: [id number] LOS: 6 days   AGE/SEX: 84 y.o. female  ROOM: [facility]     Subjective   Subjective   No acute events overnight.  Patient still complaining of bilateral lower extremity pain.  Describes it as a burning sensation, seems most consistent with neuropathy.  No chest pain or shortness of breath.    Objective   Objective     Vital Signs  Temp:  [97.4 °F (36.3 °C)-97.9 °F (36.6 °C)] 97.7 °F (36.5 °C)  Heart Rate:  [61-79] 76  Resp:  [10-20] 20  BP: (107-164)/() 155/66  SpO2:  [90 %-97 %] 93 %  on   ;   Device (Oxygen Therapy): room air  Body mass index is 32.86 kg/m².    Physical Exam  General: Sleeping comfortably but does arouse.  Chronically ill-appearing.  ENT: No conjunctival injection or scleral icterus. Moist mucous membranes.   Neuro: Eyes open and moving in all directions, generalized weakness, face symmetric, no focal deficits.   Lungs: Clear to auscultation bilaterally. No wheeze or crackles. No distress.   Heart: RRR, no murmurs.   Abdomen: Soft, non-tender, non-distended. Normal bowel sounds.   Ext: Lower extremities wrapped in lymphedema wraps today, remains tender to palpation.  Skin: No rashes or lesions. IV site without swelling or erythema.     Results Review     I reviewed the patient's new clinical results:  Results from last 7 days   Lab Units 10/17/24  0806 10/16/24  0435 10/15/24  0547 10/14/24  0714   WBC 10*3/mm3 8.78 7.43 9.36 8.72   HEMOGLOBIN g/dL 9.2* 8.9* 10.1* 9.9*   PLATELETS 10*3/mm3 289 299 304 321     Results from last 7 days   Lab Units 10/17/24  0806 10/16/24  0435 10/15/24  0546 10/14/24  1649   SODIUM mmol/L 134* 130* 132* 137   POTASSIUM mmol/L 5.3* 4.9 4.8 5.0   CHLORIDE mmol/L 99 97* 99 98   CO2 mmol/L 22.4 20.8* 21.3* 20.7*   BUN mg/dL 78* 85* 89* 86*   CREATININE mg/dL 2.61* 3.07* 3.30* 3.24*   GLUCOSE mg/dL 258* 241* 111* 100*   EGFR mL/min/1.73 17.6* 14.5* 13.3* 13.6*      Results from last 7 days   Lab Units 10/17/24  0806 10/16/24  0435 10/15/24  0546 10/14/24  0714 10/13/24  0416 10/12/24  0550   ALBUMIN g/dL 3.2* 3.1* 3.2* 3.5 3.5 3.4*   BILIRUBIN mg/dL  --   --  0.2  --  <0.2 0.2   ALK PHOS U/L  --   --  124*  --  131* 131*   AST (SGOT) U/L  --   --  9  --  8 8   ALT (SGPT) U/L  --   --  5  --  5 <5     Results from last 7 days   Lab Units 10/17/24  0806 10/16/24  0435 10/15/24  0546 10/14/24  1649 10/14/24  0714   CALCIUM mg/dL 8.6 8.6 8.2* 8.6 8.3*   ALBUMIN g/dL 3.2* 3.1* 3.2*  --  3.5   MAGNESIUM mg/dL 1.9 1.8 1.8  --  1.7   PHOSPHORUS mg/dL 5.9* 7.0* 6.8*  --  7.1*       Glucose   Date/Time Value Ref Range Status   10/17/2024 1121 247 (H) 70 - 130 mg/dL Final   10/17/2024 0700 247 (H) 70 - 130 mg/dL Final   10/16/2024 2221 238 (H) 70 - 130 mg/dL Final   10/16/2024 1615 258 (H) 70 - 130 mg/dL Final   10/16/2024 1117 284 (H) 70 - 130 mg/dL Final   10/16/2024 0630 257 (H) 70 - 130 mg/dL Final   10/15/2024 2020 232 (H) 70 - 130 mg/dL Final       No radiology results for the last day    I have personally reviewed all medications:  Scheduled Medications  atorvastatin, 80 mg, Oral, Nightly  bisoprolol, 5 mg, Oral, Daily  castor oil-balsam peru, 1 Application, Topical, Q12H  DULoxetine, 30 mg, Oral, Daily  enoxaparin, 30 mg, Subcutaneous, Q24H  ferrous sulfate, 325 mg, Oral, Every Other Day  hydrALAZINE, 50 mg, Oral, Q8H  insulin glargine, 50 Units, Subcutaneous, Daily  insulin lispro, 2-9 Units, Subcutaneous, 4x Daily AC & at Bedtime  insulin lispro, 5 Units, Subcutaneous, TID With Meals  levothyroxine, 137 mcg, Oral, Q AM  Menthol-Zinc Oxide, 1 Application, Topical, Q12H  pantoprazole, 40 mg, Oral, Q AM  pregabalin, 100 mg, Oral, BID  saccharomyces boulardii, 500 mg, Oral, BID  sodium bicarbonate, 1,300 mg, Oral, TID  terazosin, 1 mg, Oral, Nightly    Infusions   Diet  Diet: Cardiac, Diabetic; Healthy Heart (2-3 Na+); Consistent Carbohydrate; Fluid Consistency: Thin (IDDSI  0)      Intake/Output Summary (Last 24 hours) at 10/17/2024 1312  Last data filed at 10/17/2024 0600  Gross per 24 hour   Intake --   Output 1000 ml   Net -1000 ml       Assessment/Plan     Active Hospital Problems    Diagnosis  POA    **CKD (chronic kidney disease) stage 4, GFR 15-29 ml/min [N18.4]  Yes    Acute UTI (urinary tract infection) [N39.0]  Yes    Metabolic acidosis [E87.20]  Yes    Bilateral cellulitis of lower leg [L03.116, L03.115]  Yes    Bilateral lower extremity edema [R60.0]  Yes    Anxiety disorder [F41.9]  Yes    Neuropathic pain [M79.2]  Yes    Acute kidney injury (VIPUL) with acute tubular necrosis (ATN) [N17.0]  Yes    Severe hypothyroidism [E03.9]  Yes    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       84 y.o. female with CKD (chronic kidney disease) stage 4, GFR 15-29 ml/min.    Bilateral lower extremity neuropathy  -Bilateral lower extremity duplex negative for DVT  -Electrolytes reviewed and appropriate  -Continue Lyrica  -Try low-dose ropinirole today to see if that helps    CKD stage IV  Hyponatremia  Hyperkalemia  Metabolic acidosis  -Creatinine slightly improved today to 2.61, potassium 5.3, sodium 134  -Bicarbonate normal today  -Nephrology consulted and following  -Diuresis per nephrology  -Continue sodium bicarbonate tablets 1300 mg 3 times daily  -Repeat BMP with morning labs    Type 2 diabetes mellitus  -Current regimen: Lantus 40 units daily and SSI  -Blood glucoses extremely labile.  Going to increase Lantus to 60 units daily.  Will also add 8 units 3 times daily of mealtime insulin to see if this helps achieve better control.  Monitor for hypoglycemia, as she has had this previously.  -Ongoing titration of insulin based on requirements    Urinary tract infection  -Urine  culture growing Klebsiella ESBL  -ID was consulted  -S/p Invanz x 3 days    Anemia of chronic kidney disease  -Hgb improved to 9.2 today  -No signs of active bleeding  -Repeat CBC with morning labs, transfuse for Hgb less than 7    Hypertension  -Blood pressure trend somewhat labile but trend overall acceptable  -Continue current medications  -Ongoing titration of meds based on BP trend    Pancreatic neuroendocrine tumor  -Previous provider discussed with Dr. Gutierrez  -Receives monthly octreotide infusion, patient will need to reschedule this when she is out of the hospital    [facility] for DVT prophylaxis.  Limited code (no CPR, no intubation).  Discussed with patient.  Anticipate discharge to SNF when arrangements have been made.  Patient will need pre-CERT.      Savita Mccormick MD  [facility]  10/17/24  13:12 EDT

## 2024-10-17 NOTE — PROGRESS NOTES
[facility] Progress Note      Patient Name: Halie Guidry  : 1940  MRN: [id number]  Primary Care Physician:  Napoleon Mead MD  Date of admission: 10/10/2024    Subjective     Interval History:   Follow-up acute kidney injury on chronic kidney    The patient is complaining of severe leg pain requiring frequent doses of narcotics, denies any chest pain or shortness of air, no orthopnea or PND, no nausea or vomiting.    Review of Systems:   As noted above    Objective     Vitals:   Temp:  [96.4 °F (35.8 °C)-97.9 °F (36.6 °C)] 97.9 °F (36.6 °C)  Heart Rate:  [61-79] 76  Resp:  [10-20] 10  BP: (107-164)/() 145/54    Intake/Output Summary (Last 24 hours) at 10/17/2024 0758  Last data filed at 10/17/2024 0600  Gross per 24 hour   Intake --   Output 1000 ml   Net -1000 ml       Physical Exam:    General Appearance: alert, awake, chronically ill, no acute distress   Skin: warm and dry  HEENT: oral mucosa normal, nonicteric sclera  Neck: No JVD  Lungs: Clear to auscultation, unlabored breathing effort  Heart: RRR, no rub  Abdomen: soft, nontender, nondistended  : no palpable bladder  Extremities: Brawny BLE edema with warmth and erythema, presumed ulcerations to bilateral heels (covered with dressing), no clubbing or cyanosis   Neuro: normal speech and mental status     Scheduled Meds:     atorvastatin, 80 mg, Oral, Nightly  bisoprolol, 5 mg, Oral, Daily  castor oil-balsam peru, 1 Application, Topical, Q12H  DULoxetine, 30 mg, Oral, Daily  enoxaparin, 30 mg, Subcutaneous, Q24H  ferrous sulfate, 325 mg, Oral, Every Other Day  hydrALAZINE, 50 mg, Oral, Q8H  insulin glargine, 50 Units, Subcutaneous, Daily  insulin lispro, 2-9 Units, Subcutaneous, 4x Daily AC & at Bedtime  insulin lispro, 5 Units, Subcutaneous, TID With Meals  levothyroxine, 137 mcg, Oral, Q AM  Menthol-Zinc Oxide, 1 Application, Topical, Q12H  mupirocin, 1 Application, Each Nare, BID  pantoprazole, 40 mg,  Oral, Q AM  pregabalin, 100 mg, Oral, BID  saccharomyces boulardii, 500 mg, Oral, BID  sodium bicarbonate, 1,300 mg, Oral, TID  terazosin, 1 mg, Oral, Nightly      IV Meds:        Results Reviewed:   I have personally reviewed the results from the time of this admission to 10/17/2024 07:58 EDT     Results from last 7 days   Lab Units 10/16/24  0435 10/15/24  0546 10/14/24  1649 10/14/24  0714 10/13/24  0416 10/12/24  0550   SODIUM mmol/L 130* 132* 137   < > 134* 136   POTASSIUM mmol/L 4.9 4.8 5.0   < > 4.7 4.6   CHLORIDE mmol/L 97* 99 98   < > 99 107   CO2 mmol/L 20.8* 21.3* 20.7*   < > 20.4* 18.3*   BUN mg/dL 85* 89* 86*   < > 72* 68*   CREATININE mg/dL 3.07* 3.30* 3.24*   < > 2.37* 2.52*   CALCIUM mg/dL 8.6 8.2* 8.6   < > 8.5* 8.4*   BILIRUBIN mg/dL  --  0.2  --   --  <0.2 0.2   ALK PHOS U/L  --  124*  --   --  131* 131*   ALT (SGPT) U/L  --  5  --   --  5 <5   AST (SGOT) U/L  --  9  --   --  8 8   GLUCOSE mg/dL 241* 111* 100*   < > 234* 256*    < > = values in this interval not displayed.       Estimated Creatinine Clearance: 16.7 mL/min (A) (by C-G formula based on SCr of 3.07 mg/dL (H)).    Results from last 7 days   Lab Units 10/16/24  0435 10/15/24  0546 10/14/24  0714   MAGNESIUM mg/dL 1.8 1.8 1.7   PHOSPHORUS mg/dL 7.0* 6.8* 7.1*       Results from last 7 days   Lab Units 10/16/24  0435 10/15/24  0546 10/14/24  0714 10/13/24  0416   URIC ACID mg/dL 11.6* 12.1* 11.6* 11.0*       Results from last 7 days   Lab Units 10/16/24  0435 10/15/24  0547 10/14/24  0714 10/13/24  0416 10/12/24  0550   WBC 10*3/mm3 7.43 9.36 8.72 9.49 9.30   HEMOGLOBIN g/dL 8.9* 10.1* 9.9* 9.6* 9.4*   PLATELETS 10*3/mm3 299 304 321 350 321             Assessment / Plan     ASSESSMENT:  Acute kidney injury vs. Progression of chronic kidney disease stage IV- Patient creatinine up until April of this year was previously running ~1.5 +/- but unfortunately after her hospitalization at that time for VIPUL and hyperglycemia her kidney function  did not recover to baseline. For the last two months her creatinine has been running 2.2-2.6 mg/dl. Etiology is likely progression of her kidney disease secondary to poorly controlled diabetes. The creatinine yesterday was down to 3.07 and sodium is 130, other electrolyte within acceptable, today's labs still  Hyponatremia-sodium yesterday was 130, associate with volume excess but stable type II diabetes with renal complications- Poorly controlled. Hgb A1c 10.3 on admission. Patient states she lives in assisted living but does not receive medication assistance. She reports very poor understanding of her disease management. Will defer treatment and education to primary but did explain that this is likely the cause of her kidney disease progression and stressed the importance of getting her glucose under control.   Metabolic acidosis- Co2 yesterday was 20.8.  On oral sodium bicarb  Bilateral lower extremity cellulitis- Treatment per primary team  UTI- U treated.  History of hypertension-blood pressure is reasonably controlled  Anemia in chronic kidney disease-hemoglobin yesterday was 8.9 g/dl, improving. Unfortunately while she is being treated for active infection she would not benefit from IV iron replacement or KHUSHBU.  Hyperuricemia straight with decreased effective intravascular    PLAN:  Continue the same treatment  Check today's lab and decide if diuretics is needed  Surveillance labs      I reviewed the chart and other providers notes, reviewed labs.  Discussed the case with the patient and her nurse at the bedside  Copied text in this note has been reviewed and is accurate as of 10/17/24.       Thank you for involving us in the care of Halie Guidry.  Please feel free to call with any questions.    Chidi Lanier MD  10/17/24  07:58 EDT    [facility]  [phone]    Please note that portions of this note were completed with a voice recognition program.

## 2024-10-17 NOTE — PLAN OF CARE
"Goal Outcome Evaluation:  Plan of Care Reviewed With: patient           Outcome Evaluation: Pt was awake and agreeable to PT this PM. Pt c/o of 10/10 kinsey LE pain and states she is unable to get up because of it. Pt appears to be extremely lethargic. Pt was given pain medication administered by PAUL Pabon prior to PT. Pt was able to follow commands with additional verbal cueing. Pt performed bed level exercises with all 4 extremities. Pt expresses having L shoulder stiffness during shoulder elevation exercise. Pt asks \"can I get my blood sugar taken?\" and notified nursing assistant. PT will continue to follow. DC recommendation is SNF.                             "

## 2024-10-17 NOTE — PLAN OF CARE
Goal Outcome Evaluation:  Plan of Care Reviewed With: patient        Progress: no change  Outcome Evaluation: A&Ox4. C/o bilateral leg pain, medicated per orders. Dressing changes complete. Shifting weight in bed per self. Blood sugars monitored.

## 2024-10-18 LAB
ALBUMIN SERPL-MCNC: 3.2 G/DL (ref 3.5–5.2)
ANION GAP SERPL CALCULATED.3IONS-SCNC: 9.9 MMOL/L (ref 5–15)
BASOPHILS # BLD AUTO: 0.09 10*3/MM3 (ref 0–0.2)
BASOPHILS NFR BLD AUTO: 1.1 % (ref 0–1.5)
BUN SERPL-MCNC: 66 MG/DL (ref 8–23)
BUN/CREAT SERPL: 27 (ref 7–25)
CALCIUM SPEC-SCNC: 8.8 MG/DL (ref 8.6–10.5)
CHLORIDE SERPL-SCNC: 99 MMOL/L (ref 98–107)
CO2 SERPL-SCNC: 25.1 MMOL/L (ref 22–29)
CREAT SERPL-MCNC: 2.44 MG/DL (ref 0.57–1)
DEPRECATED RDW RBC AUTO: 51.4 FL (ref 37–54)
EGFRCR SERPLBLD CKD-EPI 2021: 19.1 ML/MIN/1.73
EOSINOPHIL # BLD AUTO: 0.36 10*3/MM3 (ref 0–0.4)
EOSINOPHIL NFR BLD AUTO: 4.5 % (ref 0.3–6.2)
ERYTHROCYTE [DISTWIDTH] IN BLOOD BY AUTOMATED COUNT: 15.8 % (ref 12.3–15.4)
GLUCOSE BLDC GLUCOMTR-MCNC: 110 MG/DL (ref 70–130)
GLUCOSE BLDC GLUCOMTR-MCNC: 125 MG/DL (ref 70–130)
GLUCOSE BLDC GLUCOMTR-MCNC: 195 MG/DL (ref 70–130)
GLUCOSE BLDC GLUCOMTR-MCNC: 233 MG/DL (ref 70–130)
GLUCOSE BLDC GLUCOMTR-MCNC: 233 MG/DL (ref 70–130)
GLUCOSE BLDC GLUCOMTR-MCNC: 239 MG/DL (ref 70–130)
GLUCOSE SERPL-MCNC: 239 MG/DL (ref 65–99)
HCT VFR BLD AUTO: 30.5 % (ref 34–46.6)
HGB BLD-MCNC: 9.4 G/DL (ref 12–15.9)
IMM GRANULOCYTES # BLD AUTO: 0.05 10*3/MM3 (ref 0–0.05)
IMM GRANULOCYTES NFR BLD AUTO: 0.6 % (ref 0–0.5)
LYMPHOCYTES # BLD AUTO: 0.91 10*3/MM3 (ref 0.7–3.1)
LYMPHOCYTES NFR BLD AUTO: 11.4 % (ref 19.6–45.3)
MAGNESIUM SERPL-MCNC: 2 MG/DL (ref 1.6–2.4)
MCH RBC QN AUTO: 27.3 PG (ref 26.6–33)
MCHC RBC AUTO-ENTMCNC: 30.8 G/DL (ref 31.5–35.7)
MCV RBC AUTO: 88.7 FL (ref 79–97)
MONOCYTES # BLD AUTO: 0.67 10*3/MM3 (ref 0.1–0.9)
MONOCYTES NFR BLD AUTO: 8.4 % (ref 5–12)
NEUTROPHILS NFR BLD AUTO: 5.92 10*3/MM3 (ref 1.7–7)
NEUTROPHILS NFR BLD AUTO: 74 % (ref 42.7–76)
NRBC BLD AUTO-RTO: 0 /100 WBC (ref 0–0.2)
PHOSPHATE SERPL-MCNC: 4.7 MG/DL (ref 2.5–4.5)
PLATELET # BLD AUTO: 294 10*3/MM3 (ref 140–450)
PMV BLD AUTO: 9.9 FL (ref 6–12)
POTASSIUM SERPL-SCNC: 4.9 MMOL/L (ref 3.5–5.2)
RBC # BLD AUTO: 3.44 10*6/MM3 (ref 3.77–5.28)
SODIUM SERPL-SCNC: 134 MMOL/L (ref 136–145)
URATE SERPL-MCNC: 11.6 MG/DL (ref 2.4–5.7)
WBC NRBC COR # BLD AUTO: 8 10*3/MM3 (ref 3.4–10.8)

## 2024-10-18 PROCEDURE — 97110 THERAPEUTIC EXERCISES: CPT

## 2024-10-18 PROCEDURE — 63710000001 INSULIN LISPRO (HUMAN) PER 5 UNITS: Performed by: NURSE PRACTITIONER

## 2024-10-18 PROCEDURE — 85025 COMPLETE CBC W/AUTO DIFF WBC: CPT | Performed by: INTERNAL MEDICINE

## 2024-10-18 PROCEDURE — 25010000002 ENOXAPARIN PER 10 MG: Performed by: HOSPITALIST

## 2024-10-18 PROCEDURE — 80069 RENAL FUNCTION PANEL: CPT | Performed by: INTERNAL MEDICINE

## 2024-10-18 PROCEDURE — 63710000001 INSULIN GLARGINE PER 5 UNITS: Performed by: STUDENT IN AN ORGANIZED HEALTH CARE EDUCATION/TRAINING PROGRAM

## 2024-10-18 PROCEDURE — 94761 N-INVAS EAR/PLS OXIMETRY MLT: CPT

## 2024-10-18 PROCEDURE — 87102 FUNGUS ISOLATION CULTURE: CPT | Performed by: STUDENT IN AN ORGANIZED HEALTH CARE EDUCATION/TRAINING PROGRAM

## 2024-10-18 PROCEDURE — 82948 REAGENT STRIP/BLOOD GLUCOSE: CPT

## 2024-10-18 PROCEDURE — 97530 THERAPEUTIC ACTIVITIES: CPT

## 2024-10-18 PROCEDURE — 94799 UNLISTED PULMONARY SVC/PX: CPT

## 2024-10-18 PROCEDURE — 84550 ASSAY OF BLOOD/URIC ACID: CPT | Performed by: INTERNAL MEDICINE

## 2024-10-18 PROCEDURE — 83735 ASSAY OF MAGNESIUM: CPT | Performed by: STUDENT IN AN ORGANIZED HEALTH CARE EDUCATION/TRAINING PROGRAM

## 2024-10-18 PROCEDURE — 63710000001 INSULIN LISPRO (HUMAN) PER 5 UNITS: Performed by: STUDENT IN AN ORGANIZED HEALTH CARE EDUCATION/TRAINING PROGRAM

## 2024-10-18 RX ORDER — INSULIN LISPRO 100 [IU]/ML
10 INJECTION, SOLUTION INTRAVENOUS; SUBCUTANEOUS
Status: DISCONTINUED | OUTPATIENT
Start: 2024-10-18 | End: 2024-10-19 | Stop reason: HOSPADM

## 2024-10-18 RX ADMIN — CASTOR OIL AND BALSAM, PERU 1 APPLICATION: 788; 87 OINTMENT TOPICAL at 21:16

## 2024-10-18 RX ADMIN — BISOPROLOL FUMARATE 5 MG: 5 TABLET, FILM COATED ORAL at 09:13

## 2024-10-18 RX ADMIN — ROPINIROLE HYDROCHLORIDE 0.25 MG: 0.5 TABLET, FILM COATED ORAL at 21:15

## 2024-10-18 RX ADMIN — ENOXAPARIN SODIUM 30 MG: 100 INJECTION SUBCUTANEOUS at 16:59

## 2024-10-18 RX ADMIN — DULOXETINE HYDROCHLORIDE 30 MG: 30 CAPSULE, DELAYED RELEASE ORAL at 09:13

## 2024-10-18 RX ADMIN — INSULIN LISPRO 4 UNITS: 100 INJECTION, SOLUTION INTRAVENOUS; SUBCUTANEOUS at 09:12

## 2024-10-18 RX ADMIN — HYDRALAZINE HYDROCHLORIDE 50 MG: 50 TABLET ORAL at 01:39

## 2024-10-18 RX ADMIN — LEVOTHYROXINE SODIUM 137 MCG: 137 TABLET ORAL at 05:32

## 2024-10-18 RX ADMIN — INSULIN GLARGINE 60 UNITS: 100 INJECTION, SOLUTION SUBCUTANEOUS at 09:12

## 2024-10-18 RX ADMIN — ACETAMINOPHEN 325MG 650 MG: 325 TABLET ORAL at 05:32

## 2024-10-18 RX ADMIN — INSULIN GLARGINE 10 UNITS: 100 INJECTION, SOLUTION SUBCUTANEOUS at 12:13

## 2024-10-18 RX ADMIN — HYDROCODONE BITARTRATE AND ACETAMINOPHEN 1 TABLET: 10; 325 TABLET ORAL at 01:41

## 2024-10-18 RX ADMIN — INSULIN LISPRO 8 UNITS: 100 INJECTION, SOLUTION INTRAVENOUS; SUBCUTANEOUS at 09:14

## 2024-10-18 RX ADMIN — TERAZOSIN 1 MG: 1 CAPSULE ORAL at 21:16

## 2024-10-18 RX ADMIN — ROPINIROLE HYDROCHLORIDE 0.25 MG: 0.5 TABLET, FILM COATED ORAL at 15:56

## 2024-10-18 RX ADMIN — INSULIN LISPRO 4 UNITS: 100 INJECTION, SOLUTION INTRAVENOUS; SUBCUTANEOUS at 12:13

## 2024-10-18 RX ADMIN — ATORVASTATIN CALCIUM 80 MG: 80 TABLET, FILM COATED ORAL at 21:13

## 2024-10-18 RX ADMIN — HYDRALAZINE HYDROCHLORIDE 50 MG: 50 TABLET ORAL at 15:56

## 2024-10-18 RX ADMIN — HYDRALAZINE HYDROCHLORIDE 50 MG: 50 TABLET ORAL at 21:17

## 2024-10-18 RX ADMIN — INSULIN LISPRO 10 UNITS: 100 INJECTION, SOLUTION INTRAVENOUS; SUBCUTANEOUS at 12:14

## 2024-10-18 RX ADMIN — HYDROCODONE BITARTRATE AND ACETAMINOPHEN 1 TABLET: 10; 325 TABLET ORAL at 09:18

## 2024-10-18 RX ADMIN — Medication 500 MG: at 21:17

## 2024-10-18 RX ADMIN — HYDRALAZINE HYDROCHLORIDE 50 MG: 50 TABLET ORAL at 05:32

## 2024-10-18 RX ADMIN — PREGABALIN 100 MG: 75 CAPSULE ORAL at 21:13

## 2024-10-18 RX ADMIN — ANORECTAL OINTMENT 1 APPLICATION: 15.7; .44; 24; 20.6 OINTMENT TOPICAL at 09:13

## 2024-10-18 RX ADMIN — ANORECTAL OINTMENT 1 APPLICATION: 15.7; .44; 24; 20.6 OINTMENT TOPICAL at 21:15

## 2024-10-18 RX ADMIN — Medication 2.5 MG: at 21:13

## 2024-10-18 RX ADMIN — Medication 500 MG: at 09:13

## 2024-10-18 RX ADMIN — PANTOPRAZOLE SODIUM 40 MG: 40 TABLET, DELAYED RELEASE ORAL at 05:32

## 2024-10-18 RX ADMIN — CASTOR OIL AND BALSAM, PERU 1 APPLICATION: 788; 87 OINTMENT TOPICAL at 09:13

## 2024-10-18 RX ADMIN — PREGABALIN 100 MG: 75 CAPSULE ORAL at 09:13

## 2024-10-18 NOTE — THERAPY TREATMENT NOTE
Patient Name: Halie Guidry  : 1940    MRN: [id number]                              Today's Date: 10/18/2024       Admit Date: 10/10/2024    Visit Dx:     ICD-10-CM ICD-9-CM   1. Acute UTI  N39.0 599.0   2. Type 2 diabetes mellitus with hyperglycemia, with long-term current use of insulin  E11.65 250.00    Z79.4 790.29     V58.67   3. Poorly controlled diabetes mellitus  E11.65 250.00   4. Venous stasis dermatitis of both lower extremities  I87.2 454.1   5. Chronic renal impairment, unspecified CKD stage  N18.9 585.9   6. Decreased mobility and endurance  Z74.09 780.99     Patient Active Problem List   Diagnosis    Compression fracture    Lumbar degenerative disc disease    Type 2 diabetes mellitus with hyperglycemia, with long-term current use of insulin    Hyperlipidemia    Benign essential hypertension    Primary hypothyroidism    Neuropathy    Osteoporosis    Proteinuria    Tobacco abuse    Generalized weakness    Spinal stenosis    Scoliosis    Arthritis    VBI (vertebrobasilar insufficiency)    Orthostatic hypotension    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    Severe hypothyroidism    Noncompliance with medication regimen    Vitamin D deficiency disease    Tremor    Seizure    Thoracic degenerative disc disease    Acute kidney injury (VIPUL) with acute tubular necrosis (ATN)    Hyperglycemia    Type II diabetes mellitus, uncontrolled    Lower abdominal pain    Transaminitis    Emphysematous cystitis    Choledocholithiasis    Hypokalemia    Hypoxia    Generalized abdominal pain    History of Clostridium difficile infection    History of ERCP    Hypomagnesemia    Anxiety disorder    Neuropathic pain    Intertrigo    Weakness of both lower extremities    Accelerated hypertension    Acute cystitis without hematuria    Left lower lobe pneumonia    Cellulitis and abscess of left lower extremity    Benign hypertension with CKD (chronic kidney disease) stage IV    Peripheral edema     Primary malignant neuroendocrine tumor of pancreas    Encounter for long-term (current) use of high-risk medication    Diabetic foot ulcer    Stage 3a chronic kidney disease    Pressure injury of buttock, stage 2    HTN (hypertension)    Anemia due to chronic kidney disease    Bilateral lower extremity edema    Cellulitis of right lower extremity    CKD (chronic kidney disease) stage 4, GFR 15-29 ml/min    Acute CHF    Bilateral cellulitis of lower leg    Acute UTI    UTI (urinary tract infection)    Acute UTI (urinary tract infection)    Metabolic acidosis    Cobalamin deficiency    Dependent edema    Gastroesophageal reflux disease    Mild neurocognitive disorder    Pancreatic neoplasm    Congestive heart failure     Past Medical History:   Diagnosis Date    Acute metabolic encephalopathy 06/24/2022    Anxiety     Arthritis     Benign essential hypertension 08/20/2014    Bleeding disorder     Depression     Diabetes     Diabetes mellitus, type 2     Disc degeneration, lumbar     Headache, tension-type     Hyperlipidemia     Hypothyroidism     Neuropathy     Osteoporosis 09/09/2015    Pancreatic mass     Sept 2023, on Monthly Octreotide Injection    Peripheral neuropathy     Rotator cuff tear, left     Scoliosis     Shoulder pain     LEFT, TORN ROTATOR CUFF S/P FALL    Spinal stenosis      Past Surgical History:   Procedure Laterality Date    APPENDECTOMY      BILATERAL BREAST REDUCTION Bilateral 08/2015    CATARACT EXTRACTION  03/2015    CHOLECYSTECTOMY WITH INTRAOPERATIVE CHOLANGIOGRAM N/A 3/27/2022    Procedure: CHOLECYSTECTOMY LAPAROSCOPIC INTRAOPERATIVE CHOLANGIOGRAM;  Surgeon: Aiyana Hill MD;  Location: [facility] MAIN OR;  Service: General;  Laterality: N/A;    COLONOSCOPY  06/05/2015    WNL    ERCP N/A 2/25/2022    Procedure: ENDOSCOPIC RETROGRADE CHOLANGIOPANCREATOGRAPHY with sphincterotomy and balloon sweep;  Surgeon: Chilo Wilhelm MD;  Location: [facility] ENDOSCOPY;  Service: Gastroenterology;   Laterality: N/A;  PRE/POST - CBD stones    ERCP N/A 3/28/2022    Procedure: ENDOSCOPIC RETROGRADE CHOLANGIOPANCREATOGRAPHY WITH SPHINCTEROTOMY AND BALLOON SWEEP;  Surgeon: Chilo Wilhelm MD;  Location: [facility] ENDOSCOPY;  Service: Gastroenterology;  Laterality: N/A;  PRE: COMMON DUCT STONE  POST: COMMON DUCT STONE    KYPHOPLASTY      REDUCTION MAMMAPLASTY      TONSILLECTOMY        General Information       Row Name 10/18/24 1429          Physical Therapy Time and Intention    Document Type therapy note (daily note) (P)   -DG     Mode of Treatment individual therapy;physical therapy (P)   -DG       Row Name 10/18/24 1429          General Information    Patient Profile Reviewed yes (P)   -DG     Existing Precautions/Restrictions fall (P)   -DG               User Key  (r) = Recorded By, (t) = Taken By, (c) = Cosigned By      Initials Name Provider Type    Elijah Muro, PT Student PT Student                   Mobility       Row Name 10/18/24 1429          Bed Mobility    Supine-Sit [place] (Bed Mobility) verbal cues;standby assist (P)   -DG     Sit-Supine [place] (Bed Mobility) minimum assist (75% patient effort);verbal cues (P)   -DG     Assistive Device (Bed Mobility) head of bed elevated;bed rails (P)   -DG     Comment, (Bed Mobility) Needed assistance sit-supine w/ Min A for legs. (P)   -DG       Row Name 10/18/24 1429          Sit-Stand Transfer    Sit-Stand [place] (Transfers) minimum assist (75% patient effort);moderate assist (50% patient effort);2 person assist;verbal cues (P)   -DG     Assistive Device (Sit-Stand Transfers) walker, front-wheeled (P)   -DG     Comment, (Sit-Stand Transfer) Performed sit to stand OOB x2 w/ Min A/Mod A x2 and rxw. Pt needed verbal cueing for hand placement. Stands with a very forward flexed position. (P)   -DG       Row Name 10/18/24 1429          Gait/Stairs (Locomotion)    [place] Level (Gait) minimum assist (75% patient effort);2 person  assist;verbal cues;nonverbal cues (demo/gesture) (P)   -DG     Assistive Device (Gait) walker, front-wheeled (P)   -DG     Patient was able to Ambulate yes (P)   -DG     Distance in Feet (Gait) 3 (P)   -DG     Deviations/Abnormal Patterns (Gait) stride length decreased;gait speed decreased;festinating/shuffling;base of support, narrow;tess decreased;weight shifting decreased (P)   -DG     Bilateral Gait Deviations forward flexed posture (P)   -DG     Comment, (Gait/Stairs) Was able to take a few side steps towards HOB w/ Min A and rxw. Needed verbal and tactile cueing for walker management. (P)   -DG               User Key  (r) = Recorded By, (t) = Taken By, (c) = Cosigned By      Initials Name Provider Type    Elijah Muro, PT Student PT Student                   Obj/Interventions       Row Name 10/18/24 1432          Motor Skills    Therapeutic Exercise -- (P)   At EOB, performed ankle pumps x10e reps and LAQ x10e reps.  -DG       Row Name 10/18/24 1432          Balance    Balance Assessment sitting static balance;sitting dynamic balance;standing static balance;standing dynamic balance (P)   -DG     Static Sitting Balance supervision (P)   -DG     Dynamic Sitting Balance standby assist (P)   -DG     Position, Sitting Balance sitting edge of bed (P)   -DG     Static Standing Balance contact guard (P)   -DG     Dynamic Standing Balance minimal assist;2-person assist (P)   -DG     Position/Device Used, Standing Balance walker, front-wheeled (P)   -DG     Comment, Balance Shaky when standing, unsteady, demostrates weakness. Not able to stand for longer than approx 30 seconds. (P)   -DG               User Key  (r) = Recorded By, (t) = Taken By, (c) = Cosigned By      Initials Name Provider Type    Elijah Muro, PT Student PT Student                   Goals/Plan    No documentation.                  Clinical Impression       Row Name 10/18/24 1436          Pain    Pre/Posttreatment Pain Comment Stated not  having pain. (P)   -DG       Row Name 10/18/24 1436          Plan of Care Review    Plan of Care Reviewed With patient (P)   -DG     Outcome Evaluation Pt was awake and agreeable to PT this PM. Pt had no c/o of pain and was able to follow commands. Pt appears to be more confused than normal. Pt stated being fearful to walk due to having falls in the past but was reassured that the pt was safe with PT. Pt performed bed mobility supine-sit w/ SBA, sit to stand x2 OOB w/ Min A/Mod A x2 and rxw, took few steps towards HOB w/ Min A x2 and rxw. Pt demostrates having generalized fatigue during activity. Pt was shaky when standing and has a forward flexed posture. PT will continue to follow. DC recommendation is SNF. (P)   -DG       Row Name 10/18/24 1436          Vital Signs    O2 Delivery Pre Treatment room air (P)   -DG     O2 Delivery Intra Treatment room air (P)   -DG     O2 Delivery Post Treatment room air (P)   -DG       Row Name 10/18/24 1436          Positioning and Restraints    Pre-Treatment Position in bed (P)   -DG     Post Treatment Position bed (P)   -DG     In Bed notified nsg;sitting;call light within reach;encouraged to call for assist;exit alarm on (P)   -DG               User Key  (r) = Recorded By, (t) = Taken By, (c) = Cosigned By      Initials Name Provider Type    Elijah Muro, PT Student PT Student                   Outcome Measures       Row Name 10/18/24 1442 10/18/24 0918       How much help from another person do you currently need...    Turning from your back to your side while in flat bed without using bedrails? 3 (P)   -DG 2  -CW    Moving from lying on back to sitting on the side of a flat bed without bedrails? 3 (P)   -DG 1  -CW    Moving to and from a bed to a chair (including a wheelchair)? 2 (P)   -DG 1  -CW    Standing up from a chair using your arms (e.g., wheelchair, bedside chair)? 2 (P)   -DG 1  -CW    Climbing 3-5 steps with a railing? 1 (P)   -DG 1  -CW    To walk in hospital  room? 1 (P)   -DG 1  -CW    AM-PAC 6 Clicks Score (PT) 12 (P)   -DG 7  -CW    Highest Level of Mobility Goal 4 --> Transfer to chair/commode (P)   -DG 2 --> Bed activities/dependent transfer  -CW              User Key  (r) = Recorded By, (t) = Taken By, (c) = Cosigned By      Initials Name Provider Type    CW Geri Trejo, RN Registered Nurse    Elijah Muro, PT Student PT Student                                 Physical Therapy Education       Title: PT OT SLP Therapies (In Progress)       Topic: Physical Therapy (In Progress)       Point: Mobility training (In Progress)       Learning Progress Summary            Patient Acceptance, E, NR by DG at 10/18/2024 1442    Acceptance, E, NR by DG at 10/17/2024 1452    Acceptance, E, VU,NR by DG at 10/16/2024 1550    Acceptance, E, VU by EM at 10/15/2024 1408    Acceptance, E,D, DU,NR by PC at 10/14/2024 1714    Acceptance, E,D, NR by JR at 10/13/2024 1505    Acceptance, E, VU by MR at 10/12/2024 1831                      Point: Home exercise program (In Progress)       Learning Progress Summary            Patient Acceptance, E, NR by DG at 10/18/2024 1442    Acceptance, E, NR by DG at 10/17/2024 1452    Acceptance, E, VU,NR by DG at 10/16/2024 1550    Acceptance, E,D, DU,NR by PC at 10/14/2024 1714    Acceptance, E,D, NR by JR at 10/13/2024 1505    Acceptance, E, VU by MR at 10/12/2024 1831                      Point: Body mechanics (In Progress)       Learning Progress Summary            Patient Acceptance, E, NR by DG at 10/18/2024 1442    Acceptance, E, NR by DG at 10/17/2024 1452    Acceptance, E, VU,NR by DG at 10/16/2024 1550    Acceptance, E,D, DU,NR by PC at 10/14/2024 1714    Acceptance, E,D, NR by JR at 10/13/2024 1505    Acceptance, E, VU by MR at 10/12/2024 1831                      Point: Precautions (In Progress)       Learning Progress Summary            Patient Acceptance, E, NR by DG at 10/18/2024 1442    Acceptance, E, NR by DG at 10/17/2024  1452    Acceptance, E, VU,NR by DG at 10/16/2024 1550    Acceptance, E,D, DU,NR by PC at 10/14/2024 1714    Acceptance, E,D, NR by JR at 10/13/2024 1505    Acceptance, E, VU by MR at 10/12/2024 1831                                      User Key       Initials Effective Dates Name Provider Type Discipline    PC 06/16/21 -  eLnora Menjivar, PT Physical Therapist PT    EM 06/16/21 -  Nelly Serrano, PT Physical Therapist PT    JR 06/16/21 -  Ulisses Warren, PT Physical Therapist PT    MR 08/03/23 -  Clarisse Chahal, RN Registered Nurse Nurse    DG 08/22/24 -  Elijah Piper PT Student PT Student PT                  PT Recommendation and Plan     Outcome Evaluation: (P) Pt was awake and agreeable to PT this PM. Pt had no c/o of pain and was able to follow commands. Pt appears to be more confused than normal. Pt stated being fearful to walk due to having falls in the past but was reassured that the pt was safe with PT. Pt performed bed mobility supine-sit w/ SBA, sit to stand x2 OOB w/ Min A/Mod A x2 and rxw, took few steps towards HOB w/ Min A x2 and rxw. Pt demostrates having generalized fatigue during activity. Pt was shaky when standing and has a forward flexed posture. PT will continue to follow. DC recommendation is SNF.     Time Calculation:         PT Charges       Row Name 10/18/24 1442             Time Calculation    Start Time 1401 (P)   -DG      Stop Time 1427 (P)   -DG      Time Calculation (min) 26 min (P)   -DG      PT Received On 10/18/24 (P)   -DG      PT - Next Appointment 10/21/24 (P)   -DG         Time Calculation- PT    Total Timed Code Minutes- PT 26 minute(s) (P)   -DG         Timed Charges    63508 - PT Therapeutic Exercise Minutes 13 (P)   -DG      06492 - PT Therapeutic Activity Minutes 13 (P)   -DG         Total Minutes    Timed Charges Total Minutes 26 (P)   -DG       Total Minutes 26 (P)   -DG                User Key  (r) = Recorded By, (t) = Taken By, (c) = Cosigned By      Initials  Name Provider Type    RIKKI Piper Elijah, PT Student PT Student                  Therapy Charges for Today       Code Description Service Date Service Provider Modifiers Qty    67181616880 HC PT THER PROC EA 15 MIN 10/17/2024 Elijah Piper, PT Student GP 1    38727584322 HC PT THER PROC EA 15 MIN 10/18/2024 Elijah Piper, PT Student GP 1    87650103948 HC PT THERAPEUTIC ACT EA 15 MIN 10/18/2024 Elijah Piper, PT Student GP 1            PT G-Codes  Outcome Measure Options: AM-PAC 6 Clicks Basic Mobility (PT)  AM-PAC 6 Clicks Score (PT): (P) 12       Elijah Piper, PT Student  10/18/2024

## 2024-10-18 NOTE — CASE MANAGEMENT/SOCIAL WORK
Continued Stay Note  [facility]     Patient Name: Halie Guidry  MRN: [id number]  Today's Date: 10/18/2024    Admit Date: 10/10/2024    Plan: [facility], pre-cert approved on 10/18 but not medically stable for discharge   Discharge Plan       Row Name 10/18/24 1402       Plan    Plan [facility], pre-cert approved on 10/18 but not medically stable for discharge    Patient/Family in Agreement with Plan yes    Plan Comments Patient's pre-cert for [facility] approved. CCP confirmed with [facility] that bed was available after 4PM today. [facility] but Dr. Mccormick said patient is not stable for discharge today. CCP canceled EMS and notified [facility] of change in discharge. CCP to follow. CD, CSW.                   Discharge Codes    No documentation.                 Expected Discharge Date and Time       Expected Discharge Date Expected Discharge Time    Oct 18, 2024

## 2024-10-18 NOTE — CONSULTS
Nutrition Services    Patient Name: Halie Guidry  YOB: 1940  MRN: [id number]  Admission date: 10/10/2024      NUTRITION SCREENING      Trending Narrative: 10/18: Pt being reassessed for follow up. Pt complaining of bilateral lower extremity pain. Requip initiated this admission. Pt sleeping at time of RD visit. Pt discussed in multidisciplinary rounds. Plan for discharge soon pending placement.        PO Diet: Diet: Cardiac, Diabetic; Healthy Heart (2-3 Na+); Consistent Carbohydrate; Fluid Consistency: Thin (IDDSI 0)   PO Supplements: -   Trending PO Intake:  10/18: %       Nutritionally-Pertinent Medications RDN Reviewed, C/W clinical course         Labs (reviewed below): Hyperglycemia - consistent CHO diet, insulin regimen in place     Results from last 7 days   Lab Units 10/18/24  0627 10/17/24  0806 10/16/24  0435 10/15/24  0546 10/14/24  0714 10/13/24  0416 10/12/24  0550   SODIUM mmol/L 134* 134* 130* 132*   < > 134* 136   POTASSIUM mmol/L 4.9 5.3* 4.9 4.8   < > 4.7 4.6   CHLORIDE mmol/L 99 99 97* 99   < > 99 107   CO2 mmol/L 25.1 22.4 20.8* 21.3*   < > 20.4* 18.3*   BUN mg/dL 66* 78* 85* 89*   < > 72* 68*   CREATININE mg/dL 2.44* 2.61* 3.07* 3.30*   < > 2.37* 2.52*   CALCIUM mg/dL 8.8 8.6 8.6 8.2*   < > 8.5* 8.4*   BILIRUBIN mg/dL  --   --   --  0.2  --  <0.2 0.2   ALK PHOS U/L  --   --   --  124*  --  131* 131*   ALT (SGPT) U/L  --   --   --  5  --  5 <5   AST (SGOT) U/L  --   --   --  9  --  8 8   GLUCOSE mg/dL 239* 258* 241* 111*   < > 234* 256*    < > = values in this interval not displayed.     Results from last 7 days   Lab Units 10/18/24  0627 10/17/24  0806 10/16/24  0435   MAGNESIUM mg/dL 2.0 1.9 1.8   PHOSPHORUS mg/dL 4.7* 5.9* 7.0*   HEMOGLOBIN g/dL 9.4* 9.2* 8.9*   HEMATOCRIT % 30.5* 27.6* 29.1*     Lab Results   Component Value Date    HGBA1C 10.30 (H) 10/12/2024          GI Function:  + BM on 10/16       Skin: Bilateral coccyx stage 1 PI       Weight Review: Estimated  "body mass index is 32.86 kg/m² as calculated from the following:    Height as of 10/8/24: 172.7 cm (67.99\").    Weight as of this encounter: 98 kg (216 lb 0.8 oz).    Comment:   10/11: Pt's wt fluctuating 170-230# x ~1 year. Expect some wt fluctuation with diuretic   10/18: wt fluctuating 214-219# this admission   Wt Readings from Last 30 Encounters:   10/17/24 0500 98 kg (216 lb 0.8 oz)   10/16/24 0603 99.5 kg (219 lb 5.7 oz)   10/15/24 0448 98.7 kg (217 lb 9.5 oz)   10/13/24 0400 97.9 kg (215 lb 13.3 oz)   10/12/24 0600 97.4 kg (214 lb 11.7 oz)   09/16/24 1433 81.6 kg (180 lb)   09/10/24 1357 77.2 kg (170 lb 3.2 oz)   09/04/24 0528 96.7 kg (213 lb 3 oz)   09/03/24 0554 96.1 kg (211 lb 13.8 oz)   09/02/24 0556 95.5 kg (210 lb 8.6 oz)   09/01/24 0650 92.4 kg (203 lb 11.3 oz)   08/31/24 0625 95.8 kg (211 lb 3.2 oz)   08/30/24 0649 96.7 kg (213 lb 3 oz)   08/29/24 0519 98.6 kg (217 lb 6 oz)   08/28/24 0536 96.2 kg (212 lb 1.3 oz)   08/27/24 0604 98.1 kg (216 lb 4.3 oz)   08/26/24 0803 97.5 kg (215 lb)   08/26/24 0700 97.6 kg (215 lb 2.7 oz)   08/25/24 0700 92.1 kg (203 lb 0.7 oz)   08/24/24 2215 93.3 kg (205 lb 11 oz)   07/18/24 1743 77.1 kg (169 lb 15.6 oz)   06/24/24 1512 77.1 kg (170 lb)   06/10/24 1255 77.1 kg (170 lb)   04/21/24 0034 84.1 kg (185 lb 6.5 oz)   04/20/24 1921 90.6 kg (199 lb 11.8 oz)   02/10/24 0527 90.6 kg (199 lb 11.8 oz)   02/09/24 0500 102 kg (225 lb 12 oz)   02/07/24 0600 102 kg (225 lb 8.5 oz)   02/06/24 0532 104 kg (229 lb 0.9 oz)   02/05/24 0548 97.8 kg (215 lb 9.8 oz)   02/04/24 0500 99.8 kg (220 lb 0.3 oz)   02/03/24 0515 99 kg (218 lb 4.1 oz)   02/01/24 0500 99.2 kg (218 lb 11.1 oz)   01/31/24 0418 95.6 kg (210 lb 12.2 oz)   01/30/24 0630 95.2 kg (209 lb 14.1 oz)   01/29/24 0525 94.7 kg (208 lb 12.4 oz)   01/28/24 0550 95.6 kg (210 lb 12.2 oz)   01/27/24 1613 88.9 kg (196 lb)   01/10/24 0027 89 kg (196 lb 3.4 oz)   01/09/24 1713 95.1 kg (209 lb 10.5 oz)   12/24/23 2324 95.1 kg (209 lb 9.6 " oz)   12/24/23 1921 81.6 kg (180 lb)   12/11/23 1558 83.6 kg (184 lb 6.4 oz)   10/16/23 1602 93.5 kg (206 lb 3.2 oz)   09/23/23 2134 86.2 kg (190 lb)   08/28/23 1449 87.5 kg (193 lb)   08/24/23 1431 83 kg (183 lb)   07/29/23 0538 93.1 kg (205 lb 4 oz)   07/28/23 0500 93.8 kg (206 lb 12.7 oz)   07/27/23 0606 94.7 kg (208 lb 12.4 oz)   07/26/23 0548 94.9 kg (209 lb 3.5 oz)   07/25/23 0539 87.2 kg (192 lb 3.9 oz)   07/24/23 0520 89.7 kg (197 lb 12 oz)   07/23/23 0600 89.4 kg (197 lb 1.5 oz)   07/22/23 0513 88.7 kg (195 lb 8.8 oz)   07/21/23 2112 88.6 kg (195 lb 5.2 oz)   07/21/23 1835 77.1 kg (170 lb)   06/02/23 1510 77.2 kg (170 lb 3.2 oz)   05/25/23 1339 77.1 kg (170 lb)   05/12/23 2206 77.1 kg (170 lb)   05/15/23 0653 77.1 kg (170 lb)   12/06/22 1411 72.6 kg (160 lb)   12/02/22 1408 72.6 kg (160 lb)   11/06/22 1632 72.6 kg (160 lb)   11/04/22 0519 93.7 kg (206 lb 9.1 oz)   09/12/22 1427 86.6 kg (191 lb)   09/01/22 2027 81.6 kg (180 lb)   06/23/22 1018 85.3 kg (188 lb)   06/23/22 0400 85.3 kg (188 lb)   06/09/22 1425 85.3 kg (188 lb)   06/02/22 1349 84.6 kg (186 lb 9.6 oz)   05/27/22 0217 80 kg (176 lb 5.9 oz)   05/26/22 2201 79.4 kg (175 lb)          --       Nutrition Problem Statement: No acute nutrition dx at this time        Nutrition Intervention: Continue current diet and encourage good PO intake.           Monitoring/Evaluation Per protocol, PO intake, Pertinent labs, Weight          RD to follow up per protocol.    Electronically signed by:  Kate Hercules RD  10/18/24 07:58 EDT

## 2024-10-18 NOTE — PROGRESS NOTES
[facility] Progress Note      Patient Name: Halie Guidry  : 1940  MRN: [id number]  Primary Care Physician:  Napoleon Mead MD  Date of admission: 10/10/2024    Subjective     Interval History:   Follow-up acute kidney injury on chronic kidney    Patient is not feeling well but not specific her leg pain has improved, no chest pain or shortness of air, no orthopnea or PND, no nausea or vomiting.    Review of Systems:   As noted above    Objective     Vitals:   Temp:  [97.4 °F (36.3 °C)-98.2 °F (36.8 °C)] 98.2 °F (36.8 °C)  Heart Rate:  [61-69] 65  Resp:  [12-20] 12  BP: (146-167)/(59-72) 159/72    Intake/Output Summary (Last 24 hours) at 10/18/2024 0757  Last data filed at 10/18/2024 0318  Gross per 24 hour   Intake --   Output 750 ml   Net -750 ml       Physical Exam:    General Appearance: alert, awake, chronically ill, no acute distress   Skin: warm and dry  HEENT: oral mucosa normal, nonicteric sclera  Neck: No JVD  Lungs: Clear to auscultation, unlabored breathing effort  Heart: RRR, no rub  Abdomen: soft, nontender, nondistended  : no palpable bladder  Extremities: Brawny BLE edema with warmth and erythema, presumed ulcerations to bilateral heels (covered with dressing), no clubbing or cyanosis   Neuro: normal speech and mental status     Scheduled Meds:     atorvastatin, 80 mg, Oral, Nightly  bisoprolol, 5 mg, Oral, Daily  castor oil-balsam peru, 1 Application, Topical, Q12H  DULoxetine, 30 mg, Oral, Daily  enoxaparin, 30 mg, Subcutaneous, Q24H  ferrous sulfate, 325 mg, Oral, Every Other Day  hydrALAZINE, 50 mg, Oral, Q8H  insulin glargine, 10 Units, Subcutaneous, Once  insulin glargine, 60 Units, Subcutaneous, Daily  insulin lispro, 2-9 Units, Subcutaneous, 4x Daily AC & at Bedtime  insulin lispro, 8 Units, Subcutaneous, TID With Meals  levothyroxine, 137 mcg, Oral, Q AM  Menthol-Zinc Oxide, 1 Application, Topical, Q12H  pantoprazole, 40 mg, Oral, Q  AM  pregabalin, 100 mg, Oral, BID  rOPINIRole, 0.25 mg, Oral, TID  saccharomyces boulardii, 500 mg, Oral, BID  sodium bicarbonate, 1,300 mg, Oral, TID  terazosin, 1 mg, Oral, Nightly      IV Meds:        Results Reviewed:   I have personally reviewed the results from the time of this admission to 10/18/2024 07:57 EDT     Results from last 7 days   Lab Units 10/18/24  0627 10/17/24  0806 10/16/24  0435 10/15/24  0546 10/14/24  0714 10/13/24  0416 10/12/24  0550   SODIUM mmol/L 134* 134* 130* 132*   < > 134* 136   POTASSIUM mmol/L 4.9 5.3* 4.9 4.8   < > 4.7 4.6   CHLORIDE mmol/L 99 99 97* 99   < > 99 107   CO2 mmol/L 25.1 22.4 20.8* 21.3*   < > 20.4* 18.3*   BUN mg/dL 66* 78* 85* 89*   < > 72* 68*   CREATININE mg/dL 2.44* 2.61* 3.07* 3.30*   < > 2.37* 2.52*   CALCIUM mg/dL 8.8 8.6 8.6 8.2*   < > 8.5* 8.4*   BILIRUBIN mg/dL  --   --   --  0.2  --  <0.2 0.2   ALK PHOS U/L  --   --   --  124*  --  131* 131*   ALT (SGPT) U/L  --   --   --  5  --  5 <5   AST (SGOT) U/L  --   --   --  9  --  8 8   GLUCOSE mg/dL 239* 258* 241* 111*   < > 234* 256*    < > = values in this interval not displayed.       Estimated Creatinine Clearance: 21 mL/min (A) (by C-G formula based on SCr of 2.44 mg/dL (H)).    Results from last 7 days   Lab Units 10/18/24  0627 10/17/24  0806 10/16/24  0435   MAGNESIUM mg/dL 2.0 1.9 1.8   PHOSPHORUS mg/dL 4.7* 5.9* 7.0*       Results from last 7 days   Lab Units 10/18/24  0627 10/17/24  0806 10/16/24  0435 10/15/24  0546 10/14/24  0714 10/13/24  0416   URIC ACID mg/dL 11.6* 11.6* 11.6* 12.1* 11.6* 11.0*       Results from last 7 days   Lab Units 10/18/24  0627 10/17/24  0806 10/16/24  0435 10/15/24  0547 10/14/24  0714   WBC 10*3/mm3 8.00 8.78 7.43 9.36 8.72   HEMOGLOBIN g/dL 9.4* 9.2* 8.9* 10.1* 9.9*   PLATELETS 10*3/mm3 294 289 299 304 321             Assessment / Plan     ASSESSMENT:  Acute kidney injury vs. Progression of chronic kidney disease stage IV- Patient creatinine up until April of this year  was previously running ~1.5 +/- but unfortunately after her hospitalization at that time for VIPUL and hyperglycemia her kidney function did not recover to baseline. For the last two months her creatinine has been running 2.2-2.6 mg/dl. Etiology is likely progression of her kidney disease secondary to poorly controlled diabetes.  The creatinine is down to 2.44, electrolyte within acceptable range other than sodium 134.  Hyponatremia-sodium up to 134, improving   type II diabetes with renal complications- Poorly controlled. Hgb A1c 10.3 on admission. Patient states she lives in assisted living but does not receive medication assistance. She reports very poor understanding of her disease management. Will defer treatment and education to primary but did explain that this is likely the cause of her kidney disease progression and stressed the importance of getting her glucose under control.   Metabolic acidosis- Co2 is up to 25.1.  On oral sodium bicarb  Bilateral lower extremity cellulitis- Treatment per primary team  UTI- U treated.  History of hypertension-blood pressure is reasonably controlled  Anemia in chronic kidney disease-hemoglobin yesterday was 8.9 g/dl, improving. Unfortunately while she is being treated for active infection she would not benefit from IV iron replacement or KHUSHBU.  Hyperuricemia straight with decreased effective intravascular    PLAN:  Continue the same treatment  Discontinue sodium bicarb  Surveillance labs      I reviewed the chart and other providers notes, reviewed labs.  Discussed the case with the patient.  Copied text in this note has been reviewed and is accurate as of 10/18/24.       Thank you for involving us in the care of Halie Guidry.  Please feel free to call with any questions.    Chidi Lanier MD  10/18/24  07:57 EDT    [facility]  [phone]    Please note that portions of this note were completed with a voice recognition program.

## 2024-10-18 NOTE — PLAN OF CARE
Problem: Adult Inpatient Plan of Care  Goal: Plan of Care Review  Outcome: Progressing  Goal: Patient-Specific Goal (Individualized)  Outcome: Progressing  Goal: Absence of Hospital-Acquired Illness or Injury  Outcome: Progressing  Intervention: Identify and Manage Fall Risk  Recent Flowsheet Documentation  Taken 10/17/2024 1800 by Pepper Yusuf RN  Safety Promotion/Fall Prevention:   clutter free environment maintained   safety round/check completed  Taken 10/17/2024 1600 by Pepper Yusuf RN  Safety Promotion/Fall Prevention:   clutter free environment maintained   safety round/check completed  Taken 10/17/2024 1415 by Pepper Yusuf RN  Safety Promotion/Fall Prevention:   clutter free environment maintained   safety round/check completed  Intervention: Prevent Skin Injury  Recent Flowsheet Documentation  Taken 10/17/2024 1415 by Pepper Yusuf RN  Body Position: position changed independently  Skin Protection: transparent dressing maintained  Intervention: Prevent and Manage VTE (Venous Thromboembolism) Risk  Recent Flowsheet Documentation  Taken 10/17/2024 1415 by Pepper Yusuf RN  VTE Prevention/Management:   bilateral   SCDs (sequential compression devices) off  Intervention: Prevent Infection  Recent Flowsheet Documentation  Taken 10/17/2024 1800 by Pepper Yusuf RN  Infection Prevention: single patient room provided  Taken 10/17/2024 1600 by Pepper Yusuf RN  Infection Prevention: single patient room provided  Taken 10/17/2024 1415 by Pepper Yusuf RN  Infection Prevention: single patient room provided  Goal: Optimal Comfort and Wellbeing  Outcome: Progressing  Intervention: Provide Person-Centered Care  Recent Flowsheet Documentation  Taken 10/17/2024 1415 by ePpper Yusuf RN  Trust Relationship/Rapport:   care explained   thoughts/feelings acknowledged  Taken 10/17/2024 0800 by Pepper Yusuf RN  Trust Relationship/Rapport:   care explained   thoughts/feelings  acknowledged  Goal: Readiness for Transition of Care  Outcome: Progressing   Goal Outcome Evaluation:

## 2024-10-18 NOTE — PLAN OF CARE
Patient VS WNL. C/o BLE pain at times relieved by PO pain meds and PO requip. Pt. A&O x2-4, pleasantly confused and forgetful at times. Pt. Q2H turns. Pt. Dressings changed per orders, cream applied to coccyx. Pt. Tolerating diet. Pt. On SSI, lantus increased by MD, BG improving. Pt. Receiving subq Lovenox for DVT. Pt. Working with PT/OT, awaiting rehab. Pt. Up with assist x1, with adequate UOP. Pt. Resting comfortably at present, will continue to monitor closely for the remainder of this RN's shift.      Problem: Adult Inpatient Plan of Care  Goal: Plan of Care Review  Outcome: Progressing  Flowsheets (Taken 10/18/2024 8100)  Progress: improving  Plan of Care Reviewed With: patient

## 2024-10-18 NOTE — CASE MANAGEMENT/SOCIAL WORK
Post-Acute Authorization Submission      Post Acute Pre-Cert Documentation  Request Submitted by Facility - Type:: Hospital  Post-Acute Authorization Type Submitted:: SNF  Date Post Acute Pre-Cert Inititated per Facility: 10/16/24  Date Post Acute Pre-Cert Completed: 10/18/24  Accepting Facility: [facility] Discharge Date Requested: 10/17/24  All Clinicals Submitted?: Yes  Had Accepting Facility at Time of Submission: Yes  Response Received from Insurance?: Approval  Response Communicated to::   Authorization Number:: APPROVED [id number]  Post Acute Pre-Cert Initiated Comment: VALID TO ADMIT UPTO 10/21/24.              Matt Moran, PCT

## 2024-10-18 NOTE — PROGRESS NOTES
Name: Halie Guidry ADMIT: 10/10/2024   : 1940  PCP: Napoleon Mead MD    MRN: [id number] LOS: 7 days   AGE/SEX: 84 y.o. female  ROOM: [facility]     Subjective   Subjective   No acute events overnight.  Patient resting comfortably today.  The ropinirole has been helping with lower extremity pain but she did not feel like it made her feel a bit dizzy.    Objective   Objective     Vital Signs  Temp:  [97.4 °F (36.3 °C)-98.2 °F (36.8 °C)] 98.2 °F (36.8 °C)  Heart Rate:  [61-71] 71  Resp:  [12-20] 12  BP: (115-167)/(59-83) 155/83  SpO2:  [92 %-98 %] 92 %  on   ;   Device (Oxygen Therapy): room air  Body mass index is 35.95 kg/m².    Physical Exam  General: Sleeping comfortably but does arouse.  Chronically ill-appearing.  ENT: No conjunctival injection or scleral icterus. Moist mucous membranes.   Neuro: Eyes open and moving in all directions, generalized weakness, face symmetric, no focal deficits.   Lungs: Clear to auscultation bilaterally. No wheeze or crackles. No distress.   Heart: RRR, no murmurs.   Abdomen: Soft, non-tender, non-distended. Normal bowel sounds.   Ext: Lower extremities wrapped in lymphedema wraps today, remains tender to palpation.  Skin: No rashes or lesions. IV site without swelling or erythema.     Results Review     I reviewed the patient's new clinical results:  Results from last 7 days   Lab Units 10/18/24  0627 10/17/24  0806 10/16/24  0435 10/15/24  0547   WBC 10*3/mm3 8.00 8.78 7.43 9.36   HEMOGLOBIN g/dL 9.4* 9.2* 8.9* 10.1*   PLATELETS 10*3/mm3 294 289 299 304     Results from last 7 days   Lab Units 10/18/24  0627 10/17/24  0806 10/16/24  0435 10/15/24  0546   SODIUM mmol/L 134* 134* 130* 132*   POTASSIUM mmol/L 4.9 5.3* 4.9 4.8   CHLORIDE mmol/L 99 99 97* 99   CO2 mmol/L 25.1 22.4 20.8* 21.3*   BUN mg/dL 66* 78* 85* 89*   CREATININE mg/dL 2.44* 2.61* 3.07* 3.30*   GLUCOSE mg/dL 239* 258* 241* 111*   EGFR mL/min/1.73 19.1* 17.6* 14.5* 13.3*     Results from last 7 days    Lab Units 10/18/24  0627 10/17/24  0806 10/16/24  0435 10/15/24  0546 10/14/24  0714 10/13/24  0416 10/12/24  0550   ALBUMIN g/dL 3.2* 3.2* 3.1* 3.2*   < > 3.5 3.4*   BILIRUBIN mg/dL  --   --   --  0.2  --  <0.2 0.2   ALK PHOS U/L  --   --   --  124*  --  131* 131*   AST (SGOT) U/L  --   --   --  9  --  8 8   ALT (SGPT) U/L  --   --   --  5  --  5 <5    < > = values in this interval not displayed.     Results from last 7 days   Lab Units 10/18/24  0627 10/17/24  0806 10/16/24  0435 10/15/24  0546   CALCIUM mg/dL 8.8 8.6 8.6 8.2*   ALBUMIN g/dL 3.2* 3.2* 3.1* 3.2*   MAGNESIUM mg/dL 2.0 1.9 1.8 1.8   PHOSPHORUS mg/dL 4.7* 5.9* 7.0* 6.8*       Glucose   Date/Time Value Ref Range Status   10/18/2024 0732 239 (H) 70 - 130 mg/dL Final   10/18/2024 0611 233 (H) 70 - 130 mg/dL Final   10/18/2024 0330 195 (H) 70 - 130 mg/dL Final   10/17/2024 2019 184 (H) 70 - 130 mg/dL Final   10/17/2024 1626 123 70 - 130 mg/dL Final   10/17/2024 1418 129 70 - 130 mg/dL Final   10/17/2024 1121 247 (H) 70 - 130 mg/dL Final       No radiology results for the last day    I have personally reviewed all medications:  Scheduled Medications  atorvastatin, 80 mg, Oral, Nightly  bisoprolol, 5 mg, Oral, Daily  castor oil-balsam peru, 1 Application, Topical, Q12H  DULoxetine, 30 mg, Oral, Daily  enoxaparin, 30 mg, Subcutaneous, Q24H  ferrous sulfate, 325 mg, Oral, Every Other Day  hydrALAZINE, 50 mg, Oral, Q8H  insulin glargine, 10 Units, Subcutaneous, Once  insulin glargine, 60 Units, Subcutaneous, Daily  insulin lispro, 2-9 Units, Subcutaneous, 4x Daily AC & at Bedtime  insulin lispro, 8 Units, Subcutaneous, TID With Meals  levothyroxine, 137 mcg, Oral, Q AM  Menthol-Zinc Oxide, 1 Application, Topical, Q12H  pantoprazole, 40 mg, Oral, Q AM  pregabalin, 100 mg, Oral, BID  rOPINIRole, 0.25 mg, Oral, TID  saccharomyces boulardii, 500 mg, Oral, BID  terazosin, 1 mg, Oral, Nightly    Infusions   Diet  Diet: Cardiac, Diabetic; Healthy Heart (2-3 Na+);  Consistent Carbohydrate; Fluid Consistency: Thin (IDDSI 0)      Intake/Output Summary (Last 24 hours) at 10/18/2024 1055  Last data filed at 10/18/2024 0829  Gross per 24 hour   Intake 240 ml   Output 750 ml   Net -510 ml       Assessment/Plan     Active Hospital Problems    Diagnosis  POA    **CKD (chronic kidney disease) stage 4, GFR 15-29 ml/min [N18.4]  Yes    Acute UTI (urinary tract infection) [N39.0]  Yes    Metabolic acidosis [E87.20]  Yes    Bilateral cellulitis of lower leg [L03.116, L03.115]  Yes    Bilateral lower extremity edema [R60.0]  Yes    Anxiety disorder [F41.9]  Yes    Neuropathic pain [M79.2]  Yes    Acute kidney injury (VIPUL) with acute tubular necrosis (ATN) [N17.0]  Yes    Severe hypothyroidism [E03.9]  Yes    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       84 y.o. female with CKD (chronic kidney disease) stage 4, GFR 15-29 ml/min.    Bilateral lower extremity neuropathy  -Bilateral lower extremity duplex negative for DVT  -Electrolytes reviewed and appropriate  -Continue Lyrica  -Ropinirole seems to be helping, but did make patient feel a little off.  Will continue for now and see if she adjusts.  If she does, could consider increasing the dose but will leave as is for now.  Discussed with nursing.    CKD stage IV  Hyponatremia  Hyperkalemia  Metabolic acidosis  -Creatinine slightly improved today to 2.44, potassium 4.9, sodium 134  -Bicarbonate normal today  -Nephrology consulted and following  -Diuresis per nephrology  -Sodium bicarbonate discontinued  -Repeat BMP with morning labs    Type 2 diabetes mellitus  -Current regimen: Lantus 40 units daily and SSI  -Blood glucoses extremely labile.  Increase Lantus to 70 units daily and lispro to 10 units 3 times daily of mealtime  insulin to see if this helps achieve better control.  Monitor for hypoglycemia, as she has had this previously.  -Ongoing titration of insulin based on requirements    Urinary tract infection  -Urine culture growing Klebsiella ESBL  -ID was consulted  -S/p Invanz x 3 days    Anemia of chronic kidney disease  -Hgb improved to 9.4 today  -No signs of active bleeding  -Repeat CBC with morning labs, transfuse for Hgb less than 7    Hypertension  -Blood pressure trend somewhat labile but trend overall acceptable  -Continue current medications  -Ongoing titration of meds based on BP trend    Pancreatic neuroendocrine tumor  -Previous provider discussed with Dr. Gutierrez  -Receives monthly octreotide infusion, patient will need to reschedule this when she is out of the hospital    Lovenox for DVT prophylaxis.  Limited code (no CPR, no intubation).  Discussed with patient.  Anticipate discharge to SNF when arrangements have been made.  Patient will need pre-CERT.      Savita Mccormick MD  [facility]  10/18/24  10:55 EDT

## 2024-10-18 NOTE — PLAN OF CARE
Goal Outcome Evaluation:  Plan of Care Reviewed With: (P) patient           Outcome Evaluation: (P) Pt was awake and agreeable to PT this PM. Pt had no c/o of pain and was able to follow commands. Pt appears to be more confused than normal. Pt stated being fearful to walk due to having falls in the past but was reassured that the pt was safe with PT. Pt performed bed mobility supine-sit w/ SBA, sit to stand x2 OOB w/ Min A/Mod A x2 and rxw, took few steps towards HOB w/ Min A x2 and rxw. Pt demostrates having generalized fatigue during activity. Pt was shaky when standing and has a forward flexed posture. PT will continue to follow. DC recommendation is SNF.

## 2024-10-18 NOTE — PLAN OF CARE
Patient a/ox3, with some confusion during shift. Patient stated vivid dreams and feeling as if she was falling. Requip started day prior for restless legs and pain. Patient stated effective, however. Dressing changed during shift, early this AM. Pending precert for SNF.     Problem: Adult Inpatient Plan of Care  Goal: Plan of Care Review  Outcome: Progressing     Problem: UTI (Urinary Tract Infection)  Goal: Improved Infection Symptoms  Outcome: Progressing   Goal Outcome Evaluation:

## 2024-10-19 ENCOUNTER — READMISSION MANAGEMENT (OUTPATIENT)
Dept: CALL CENTER | Facility: HOSPITAL | Age: 84
End: 2024-10-19
Payer: MEDICARE

## 2024-10-19 VITALS
HEART RATE: 66 BPM | WEIGHT: 216.49 LBS | BODY MASS INDEX: 36.07 KG/M2 | DIASTOLIC BLOOD PRESSURE: 65 MMHG | OXYGEN SATURATION: 95 % | SYSTOLIC BLOOD PRESSURE: 169 MMHG | TEMPERATURE: 97.4 F | RESPIRATION RATE: 18 BRPM | HEIGHT: 65 IN

## 2024-10-19 LAB
ALBUMIN SERPL-MCNC: 3.3 G/DL (ref 3.5–5.2)
ANION GAP SERPL CALCULATED.3IONS-SCNC: 8.6 MMOL/L (ref 5–15)
BASOPHILS # BLD AUTO: 0.07 10*3/MM3 (ref 0–0.2)
BASOPHILS NFR BLD AUTO: 1 % (ref 0–1.5)
BUN SERPL-MCNC: 69 MG/DL (ref 8–23)
BUN/CREAT SERPL: 31.8 (ref 7–25)
CALCIUM SPEC-SCNC: 8.9 MG/DL (ref 8.6–10.5)
CHLORIDE SERPL-SCNC: 103 MMOL/L (ref 98–107)
CO2 SERPL-SCNC: 23.4 MMOL/L (ref 22–29)
CREAT SERPL-MCNC: 2.17 MG/DL (ref 0.57–1)
DEPRECATED RDW RBC AUTO: 49.4 FL (ref 37–54)
EGFRCR SERPLBLD CKD-EPI 2021: 22 ML/MIN/1.73
EOSINOPHIL # BLD AUTO: 0.33 10*3/MM3 (ref 0–0.4)
EOSINOPHIL NFR BLD AUTO: 4.6 % (ref 0.3–6.2)
ERYTHROCYTE [DISTWIDTH] IN BLOOD BY AUTOMATED COUNT: 15.8 % (ref 12.3–15.4)
GLUCOSE BLDC GLUCOMTR-MCNC: 166 MG/DL (ref 70–130)
GLUCOSE BLDC GLUCOMTR-MCNC: 201 MG/DL (ref 70–130)
GLUCOSE SERPL-MCNC: 204 MG/DL (ref 65–99)
HCT VFR BLD AUTO: 27.6 % (ref 34–46.6)
HGB BLD-MCNC: 9.1 G/DL (ref 12–15.9)
IMM GRANULOCYTES # BLD AUTO: 0.03 10*3/MM3 (ref 0–0.05)
IMM GRANULOCYTES NFR BLD AUTO: 0.4 % (ref 0–0.5)
LYMPHOCYTES # BLD AUTO: 0.77 10*3/MM3 (ref 0.7–3.1)
LYMPHOCYTES NFR BLD AUTO: 10.8 % (ref 19.6–45.3)
MAGNESIUM SERPL-MCNC: 2 MG/DL (ref 1.6–2.4)
MCH RBC QN AUTO: 28.5 PG (ref 26.6–33)
MCHC RBC AUTO-ENTMCNC: 33 G/DL (ref 31.5–35.7)
MCV RBC AUTO: 86.5 FL (ref 79–97)
MONOCYTES # BLD AUTO: 0.63 10*3/MM3 (ref 0.1–0.9)
MONOCYTES NFR BLD AUTO: 8.9 % (ref 5–12)
NEUTROPHILS NFR BLD AUTO: 5.27 10*3/MM3 (ref 1.7–7)
NEUTROPHILS NFR BLD AUTO: 74.3 % (ref 42.7–76)
NRBC BLD AUTO-RTO: 0 /100 WBC (ref 0–0.2)
PHOSPHATE SERPL-MCNC: 4.6 MG/DL (ref 2.5–4.5)
PLATELET # BLD AUTO: 282 10*3/MM3 (ref 140–450)
PMV BLD AUTO: 9.7 FL (ref 6–12)
POTASSIUM SERPL-SCNC: 4.7 MMOL/L (ref 3.5–5.2)
RBC # BLD AUTO: 3.19 10*6/MM3 (ref 3.77–5.28)
SODIUM SERPL-SCNC: 135 MMOL/L (ref 136–145)
URATE SERPL-MCNC: 10.5 MG/DL (ref 2.4–5.7)
WBC NRBC COR # BLD AUTO: 7.1 10*3/MM3 (ref 3.4–10.8)

## 2024-10-19 PROCEDURE — 85025 COMPLETE CBC W/AUTO DIFF WBC: CPT | Performed by: INTERNAL MEDICINE

## 2024-10-19 PROCEDURE — 63710000001 INSULIN LISPRO (HUMAN) PER 5 UNITS: Performed by: NURSE PRACTITIONER

## 2024-10-19 PROCEDURE — 63710000001 INSULIN LISPRO (HUMAN) PER 5 UNITS: Performed by: STUDENT IN AN ORGANIZED HEALTH CARE EDUCATION/TRAINING PROGRAM

## 2024-10-19 PROCEDURE — 84550 ASSAY OF BLOOD/URIC ACID: CPT | Performed by: INTERNAL MEDICINE

## 2024-10-19 PROCEDURE — 82948 REAGENT STRIP/BLOOD GLUCOSE: CPT

## 2024-10-19 PROCEDURE — 80069 RENAL FUNCTION PANEL: CPT | Performed by: INTERNAL MEDICINE

## 2024-10-19 PROCEDURE — 83735 ASSAY OF MAGNESIUM: CPT | Performed by: STUDENT IN AN ORGANIZED HEALTH CARE EDUCATION/TRAINING PROGRAM

## 2024-10-19 PROCEDURE — 63710000001 INSULIN GLARGINE PER 5 UNITS: Performed by: STUDENT IN AN ORGANIZED HEALTH CARE EDUCATION/TRAINING PROGRAM

## 2024-10-19 RX ORDER — HYDROCODONE BITARTRATE AND ACETAMINOPHEN 10; 325 MG/1; MG/1
1 TABLET ORAL EVERY 6 HOURS PRN
Status: DISCONTINUED | OUTPATIENT
Start: 2024-10-19 | End: 2024-10-19 | Stop reason: HOSPADM

## 2024-10-19 RX ADMIN — DULOXETINE HYDROCHLORIDE 30 MG: 30 CAPSULE, DELAYED RELEASE ORAL at 08:34

## 2024-10-19 RX ADMIN — INSULIN LISPRO 4 UNITS: 100 INJECTION, SOLUTION INTRAVENOUS; SUBCUTANEOUS at 08:35

## 2024-10-19 RX ADMIN — LEVOTHYROXINE SODIUM 137 MCG: 137 TABLET ORAL at 05:46

## 2024-10-19 RX ADMIN — BISOPROLOL FUMARATE 5 MG: 5 TABLET, FILM COATED ORAL at 08:34

## 2024-10-19 RX ADMIN — Medication 10 ML: at 08:36

## 2024-10-19 RX ADMIN — PREGABALIN 100 MG: 75 CAPSULE ORAL at 08:34

## 2024-10-19 RX ADMIN — PANTOPRAZOLE SODIUM 40 MG: 40 TABLET, DELAYED RELEASE ORAL at 05:44

## 2024-10-19 RX ADMIN — INSULIN LISPRO 2 UNITS: 100 INJECTION, SOLUTION INTRAVENOUS; SUBCUTANEOUS at 11:27

## 2024-10-19 RX ADMIN — ROPINIROLE HYDROCHLORIDE 0.25 MG: 0.5 TABLET, FILM COATED ORAL at 08:34

## 2024-10-19 RX ADMIN — Medication 500 MG: at 08:34

## 2024-10-19 RX ADMIN — FERROUS SULFATE TAB 325 MG (65 MG ELEMENTAL FE) 325 MG: 325 (65 FE) TAB at 08:35

## 2024-10-19 RX ADMIN — INSULIN LISPRO 10 UNITS: 100 INJECTION, SOLUTION INTRAVENOUS; SUBCUTANEOUS at 11:27

## 2024-10-19 RX ADMIN — ACETAMINOPHEN 325MG 650 MG: 325 TABLET ORAL at 08:55

## 2024-10-19 RX ADMIN — HYDRALAZINE HYDROCHLORIDE 50 MG: 50 TABLET ORAL at 05:46

## 2024-10-19 RX ADMIN — INSULIN GLARGINE 70 UNITS: 100 INJECTION, SOLUTION SUBCUTANEOUS at 08:46

## 2024-10-19 RX ADMIN — INSULIN LISPRO 10 UNITS: 100 INJECTION, SOLUTION INTRAVENOUS; SUBCUTANEOUS at 08:35

## 2024-10-19 NOTE — CASE MANAGEMENT/SOCIAL WORK
Continue PPI Continued Stay Note  [facility]     Patient Name: Halie Guidry  MRN: [id number]  Today's Date: 10/19/2024    Admit Date: 10/10/2024    Plan: [facility] when medically ready   Discharge Plan       Row Name 10/19/24 1256       Plan    Plan [facility] when medically ready    Plan Comments Inbound call from RN notifying CCP that pt needs EMS transport for discharge. Scheduled with [facility] for 2:30pm and EMS form faxed up to unit. Confirmed with [facility] that bed is available today and pt going to room 603. Update: per RN, Dr Mccormick is not discharging today. EMS canceled, VM left with [facility] to update. Brock NEWBY                   Discharge Codes    No documentation.                 Expected Discharge Date and Time       Expected Discharge Date Expected Discharge Time    Oct 19, 2024               Brock Chambers RN

## 2024-10-19 NOTE — PROGRESS NOTES
[facility] Progress Note      Patient Name: Halie Guidry  : 1940  MRN: [id number]  Primary Care Physician:  Napoleon Mead MD  Date of admission: 10/10/2024    Subjective     Interval History:   Follow-up acute kidney injury on chronic kidney    Patient is not feeling well but not specific her leg pain has improved, no chest pain or shortness of air, no orthopnea or PND, no nausea or vomiting.    Review of Systems:   As noted above    Objective     Vitals:   Temp:  [97.4 °F (36.3 °C)-98.4 °F (36.9 °C)] 97.6 °F (36.4 °C)  Heart Rate:  [56-65] 60  Resp:  [11-18] 18  BP: (135-174)/(58-84) 135/58    Intake/Output Summary (Last 24 hours) at 10/19/2024 1112  Last data filed at 10/19/2024 0725  Gross per 24 hour   Intake 1230 ml   Output 1700 ml   Net -470 ml       Physical Exam:    General Appearance: alert, awake, chronically ill, no acute distress   Skin: warm and dry  HEENT: oral mucosa normal, nonicteric sclera  Neck: No JVD  Lungs: Clear to auscultation, unlabored breathing effort  Heart: RRR, no rub  Abdomen: soft, nontender, nondistended  : no palpable bladder  Extremities: Brawny BLE edema with warmth and erythema, presumed ulcerations to bilateral heels (covered with dressing), no clubbing or cyanosis   Neuro: normal speech and mental status     Scheduled Meds:     atorvastatin, 80 mg, Oral, Nightly  bisoprolol, 5 mg, Oral, Daily  castor oil-balsam peru, 1 Application, Topical, Q12H  DULoxetine, 30 mg, Oral, Daily  enoxaparin, 30 mg, Subcutaneous, Q24H  ferrous sulfate, 325 mg, Oral, Every Other Day  hydrALAZINE, 50 mg, Oral, Q8H  insulin glargine, 70 Units, Subcutaneous, Daily  insulin lispro, 10 Units, Subcutaneous, TID With Meals  insulin lispro, 2-9 Units, Subcutaneous, 4x Daily AC & at Bedtime  levothyroxine, 137 mcg, Oral, Q AM  Menthol-Zinc Oxide, 1 Application, Topical, Q12H  pantoprazole, 40 mg, Oral, Q AM  pregabalin, 100 mg, Oral, BID  rOPINIRole, 0.25  mg, Oral, TID  terazosin, 1 mg, Oral, Nightly      IV Meds:        Results Reviewed:   I have personally reviewed the results from the time of this admission to 10/19/2024 11:12 EDT     Results from last 7 days   Lab Units 10/19/24  0701 10/18/24  0627 10/17/24  0806 10/16/24  0435 10/15/24  0546 10/14/24  0714 10/13/24  0416   SODIUM mmol/L 135* 134* 134*   < > 132*   < > 134*   POTASSIUM mmol/L 4.7 4.9 5.3*   < > 4.8   < > 4.7   CHLORIDE mmol/L 103 99 99   < > 99   < > 99   CO2 mmol/L 23.4 25.1 22.4   < > 21.3*   < > 20.4*   BUN mg/dL 69* 66* 78*   < > 89*   < > 72*   CREATININE mg/dL 2.17* 2.44* 2.61*   < > 3.30*   < > 2.37*   CALCIUM mg/dL 8.9 8.8 8.6   < > 8.2*   < > 8.5*   BILIRUBIN mg/dL  --   --   --   --  0.2  --  <0.2   ALK PHOS U/L  --   --   --   --  124*  --  131*   ALT (SGPT) U/L  --   --   --   --  5  --  5   AST (SGOT) U/L  --   --   --   --  9  --  8   GLUCOSE mg/dL 204* 239* 258*   < > 111*   < > 234*    < > = values in this interval not displayed.       Estimated Creatinine Clearance: 22.4 mL/min (A) (by C-G formula based on SCr of 2.17 mg/dL (H)).    Results from last 7 days   Lab Units 10/19/24  0701 10/18/24  0627 10/17/24  0806   MAGNESIUM mg/dL 2.0 2.0 1.9   PHOSPHORUS mg/dL 4.6* 4.7* 5.9*       Results from last 7 days   Lab Units 10/19/24  0701 10/18/24  0627 10/17/24  0806 10/16/24  0435 10/15/24  0546 10/14/24  0714 10/13/24  0416   URIC ACID mg/dL 10.5* 11.6* 11.6* 11.6* 12.1* 11.6* 11.0*       Results from last 7 days   Lab Units 10/19/24  0701 10/18/24  0627 10/17/24  0806 10/16/24  0435 10/15/24  0547   WBC 10*3/mm3 7.10 8.00 8.78 7.43 9.36   HEMOGLOBIN g/dL 9.1* 9.4* 9.2* 8.9* 10.1*   PLATELETS 10*3/mm3 282 294 289 299 304             Assessment / Plan     ASSESSMENT:  Acute kidney injury vs. Progression of chronic kidney disease stage IV- Patient creatinine up until April of this year was previously running ~1.5 +/- but unfortunately after her hospitalization at that time for VIPUL  and hyperglycemia her kidney function did not recover to baseline. For the last two months her creatinine has been running 2.2-2.6 mg/dl. Etiology is likely progression of her kidney disease secondary to poorly controlled diabetes.  The creatinine is down to 2.17, electrolyte within acceptable range, the sodium up to 135  Hyponatremia-sodium up to 135, improving   type II diabetes with renal complications- Poorly controlled. Hgb A1c 10.3 on admission. Patient states she lives in assisted living but does not receive medication assistance. She reports very poor understanding of her disease management. Will defer treatment and education to primary but did explain that this is likely the cause of her kidney disease progression and stressed the importance of getting her glucose under control.   Metabolic acidosis- Co2 is 23.4.  Sodium bicarb was discontinued yesterday  Bilateral lower extremity cellulitis- Treatment per primary team  UTI- U treated.  History of hypertension-blood pressure is reasonably controlled  Anemia in chronic kidney disease-hemoglobin today is up to 9 point g/dl, improving. Unfortunately while she is being treated for active infection she would not benefit from IV iron replacement or KHUSHBU.  Hyperuricemia straight with decreased effective intravascular    PLAN:  Continue the same treatment  Surveillance labs      I reviewed the chart and other providers notes, reviewed labs.  Discussed the case with the patient.  Copied text in this note has been reviewed and is accurate as of 10/19/24.       Thank you for involving us in the care of Halie Guidry.  Please feel free to call with any questions.    Chidi Lanier MD  10/19/24  11:12 EDT    [facility]  [phone]    Please note that portions of this note were completed with a voice recognition program.

## 2024-10-19 NOTE — PLAN OF CARE
Goal Outcome Evaluation:  Plan of Care Reviewed With: patient        Progress: no change  Outcome Evaluation: Pt intermittently confused following commands and redirectable. Bilateral LE weakness with wounds. Daily dressing changes Plan to be d/c to facility 10/19. assist x1-2. Purewick in place.

## 2024-10-19 NOTE — PLAN OF CARE
Goal Outcome Evaluation:   Pt being discharged to son's care. Discharge instructions and medication list provided to son. Per Dr. Mccormick, I have requested case management to speak with son prior to discharge to confirm that patient is not to be discharged back to her assisted living facility. Pt is confused and hallucinating. Son will take mother as soon as she is dressed, may not see case management prior to leaving.

## 2024-10-19 NOTE — PROGRESS NOTES
Received notification from the patient's nurse that the son arrived at the hospital and wanted to take the patient home.  I have called and spoke with him via phone.  He is the patient's POA.  We discussed her hospital course thus far and updates from today.  As noted in my progress note, the plan was for the patient to be discharged to rehab today until she began having hallucinations.  Her son reports that she has had hallucinations before with ropinirole.  He states she has had ongoing issues with restless leg syndrome for many years and he did not understand why ropinirole had not been listed as an allergy.  He felt like at this time, his mother had improvement in her mental status with regards to the hallucinations and that she was back to herself.  We discussed the discharge plan for rehab, as the patient lives in an assisted living facility.  I told him that at this time, the patient was not able to go back to her assisted living facility given her need for rehab and higher level of care.  He was understanding of this and felt the patient would do better in his care than at a rehab facility.  He requested discharge for the patient into his car so that he could take her home and care for her at his house.  Given that the hallucinations were likely secondary to the ropinirole, have improved, and she has had this before, I did feel the patient was medically appropriate for discharge.  Outside of the hallucinations, she was otherwise medically ready.  Nephrology cleared her for discharge when I spoke with them earlier today.  I spoke with the son about return precautions and he conveyed understanding.  I also recommended that she see her primary care provider within the next week so that a repeat BMP can be done at that time.  I have asked nursing to update ropinirole as an allergy in the patient's chart so that she does not get this medication again.  All of his questions were answered and nursing was updated.

## 2024-10-19 NOTE — PROGRESS NOTES
Name: Halie Guidry ADMIT: 10/10/2024   : 1940  PCP: Napoleon Mead MD    MRN: [id number] LOS: 8 days   AGE/SEX: 84 y.o. female  ROOM: [facility]     Subjective   Subjective   No acute events overnight.  Patient resting comfortably today.  Upon waking up, patient having new hallucinations.  Thinks somebody is in the room, bothering her feet.  Discussed with nursing and she had not been complaining of this.  She denies any significant pain.    Objective   Objective     Vital Signs  Temp:  [97.6 °F (36.4 °C)-98.4 °F (36.9 °C)] 97.6 °F (36.4 °C)  Heart Rate:  [56-66] 66  Resp:  [11-18] 18  BP: (135-174)/(58-70) 157/63  SpO2:  [83 %-94 %] 83 %  on   ;   Device (Oxygen Therapy): room air  Body mass index is 36.03 kg/m².    Physical Exam  General: Sleeping comfortably but does arouse.  Chronically ill-appearing.  Oriented to person, time, and place but having hallucinations.  ENT: No conjunctival injection or scleral icterus. Moist mucous membranes.   Neuro: Eyes open and moving in all directions, generalized weakness, face symmetric, no focal deficits.   Lungs: Clear to auscultation bilaterally. No wheeze or crackles. No distress.   Heart: RRR, no murmurs.   Abdomen: Soft, non-tender, non-distended. Normal bowel sounds.   Ext: Lower extremities wrapped in lymphedema wraps today, remains tender to palpation.  Skin: No rashes or lesions. IV site without swelling or erythema.     Results Review     I reviewed the patient's new clinical results:  Results from last 7 days   Lab Units 10/19/24  0701 10/18/24  0627 10/17/24  0806 10/16/24  0435   WBC 10*3/mm3 7.10 8.00 8.78 7.43   HEMOGLOBIN g/dL 9.1* 9.4* 9.2* 8.9*   PLATELETS 10*3/mm3 282 294 289 299     Results from last 7 days   Lab Units 10/19/24  0701 10/18/24  0627 10/17/24  0806 10/16/24  0435   SODIUM mmol/L 135* 134* 134* 130*   POTASSIUM mmol/L 4.7 4.9 5.3* 4.9   CHLORIDE mmol/L 103 99 99 97*   CO2 mmol/L 23.4 25.1 22.4 20.8*   BUN mg/dL 69* 66* 78*  85*   CREATININE mg/dL 2.17* 2.44* 2.61* 3.07*   GLUCOSE mg/dL 204* 239* 258* 241*   EGFR mL/min/1.73 22.0* 19.1* 17.6* 14.5*     Results from last 7 days   Lab Units 10/19/24  0701 10/18/24  0627 10/17/24  0806 10/16/24  0435 10/15/24  0546 10/14/24  0714 10/13/24  0416   ALBUMIN g/dL 3.3* 3.2* 3.2* 3.1* 3.2*   < > 3.5   BILIRUBIN mg/dL  --   --   --   --  0.2  --  <0.2   ALK PHOS U/L  --   --   --   --  124*  --  131*   AST (SGOT) U/L  --   --   --   --  9  --  8   ALT (SGPT) U/L  --   --   --   --  5  --  5    < > = values in this interval not displayed.     Results from last 7 days   Lab Units 10/19/24  0701 10/18/24  0627 10/17/24  0806 10/16/24  0435   CALCIUM mg/dL 8.9 8.8 8.6 8.6   ALBUMIN g/dL 3.3* 3.2* 3.2* 3.1*   MAGNESIUM mg/dL 2.0 2.0 1.9 1.8   PHOSPHORUS mg/dL 4.6* 4.7* 5.9* 7.0*       Glucose   Date/Time Value Ref Range Status   10/19/2024 1115 166 (H) 70 - 130 mg/dL Final   10/19/2024 0614 201 (H) 70 - 130 mg/dL Final   10/18/2024 2051 125 70 - 130 mg/dL Final   10/18/2024 1621 110 70 - 130 mg/dL Final   10/18/2024 1115 233 (H) 70 - 130 mg/dL Final   10/18/2024 0732 239 (H) 70 - 130 mg/dL Final   10/18/2024 0611 233 (H) 70 - 130 mg/dL Final       No radiology results for the last day    I have personally reviewed all medications:  Scheduled Medications  atorvastatin, 80 mg, Oral, Nightly  bisoprolol, 5 mg, Oral, Daily  castor oil-balsam peru, 1 Application, Topical, Q12H  DULoxetine, 30 mg, Oral, Daily  enoxaparin, 30 mg, Subcutaneous, Q24H  ferrous sulfate, 325 mg, Oral, Every Other Day  hydrALAZINE, 50 mg, Oral, Q8H  insulin glargine, 70 Units, Subcutaneous, Daily  insulin lispro, 10 Units, Subcutaneous, TID With Meals  insulin lispro, 2-9 Units, Subcutaneous, 4x Daily AC & at Bedtime  levothyroxine, 137 mcg, Oral, Q AM  Menthol-Zinc Oxide, 1 Application, Topical, Q12H  pantoprazole, 40 mg, Oral, Q AM  pregabalin, 100 mg, Oral, BID  terazosin, 1 mg, Oral, Nightly    Infusions   Diet  Diet: Cardiac,  Diabetic; Healthy Heart (2-3 Na+); Consistent Carbohydrate; Fluid Consistency: Thin (IDDSI 0)      Intake/Output Summary (Last 24 hours) at 10/19/2024 1304  Last data filed at 10/19/2024 1112  Gross per 24 hour   Intake 1230 ml   Output 2150 ml   Net -920 ml       Assessment/Plan     Active Hospital Problems    Diagnosis  POA    **CKD (chronic kidney disease) stage 4, GFR 15-29 ml/min [N18.4]  Yes    Acute UTI (urinary tract infection) [N39.0]  Yes    Metabolic acidosis [E87.20]  Yes    Bilateral cellulitis of lower leg [L03.116, L03.115]  Yes    Bilateral lower extremity edema [R60.0]  Yes    Anxiety disorder [F41.9]  Yes    Neuropathic pain [M79.2]  Yes    Acute kidney injury (VIPUL) with acute tubular necrosis (ATN) [N17.0]  Yes    Severe hypothyroidism [E03.9]  Yes    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       84 y.o. female with CKD (chronic kidney disease) stage 4, GFR 15-29 ml/min.    Bilateral lower extremity neuropathy  -Bilateral lower extremity duplex negative for DVT  -Electrolytes reviewed and appropriate  -Continue Lyrica  -Ropinirole was helping with symptoms, however patient now having hallucinations.  Will discontinue today. Will see how she does off this medication prior to starting something else.  Could possibly discharge her tomorrow if hallucinations have resolved.  Cancel discharge for today.    CKD stage IV  Hyponatremia  Hyperkalemia  Metabolic acidosis  -Creatinine improved to 2.17 today, sodium 135, potassium 4.7  -Bicarbonate normal today  -Nephrology consulted and following  -Diuresis per nephrology  -Sodium bicarbonate discontinued  -Repeat BMP with morning labs    Type 2 diabetes mellitus  -Current regimen: Lantus 40 units daily and SSI  -Blood glucoses extremely labile but  trend is overall much improved.  Continue Lantus to 70 units daily and lispro to 10 units 3 times daily of mealtime insulin.  Monitor for hypoglycemia, as she has had this previously.  -Ongoing titration of insulin based on requirements    Urinary tract infection  -Urine culture growing Klebsiella ESBL  -ID was consulted  -S/p Invanz x 3 days    Anemia of chronic kidney disease  -Hgb fairly stable at 9.1 today  -No signs of active bleeding  -Repeat CBC with morning labs, transfuse for Hgb less than 7    Hypertension  -Blood pressure trend somewhat labile but trend overall acceptable  -Continue current medications  -Ongoing titration of meds based on BP trend    Pancreatic neuroendocrine tumor  -Previous provider discussed with Dr. Gutierrez  -Receives monthly octreotide infusion, patient will need to reschedule this when she is out of the hospital    Lovenox for DVT prophylaxis.  Limited code (no CPR, no intubation).  Discussed with patient.  Anticipate discharge to SNF when arrangements have been made.  Patient will need pre-CERT.      Savita Mccormick MD  [facility]  10/19/24  13:04 EDT

## 2024-10-19 NOTE — OUTREACH NOTE
Prep Survey      Flowsheet Row Responses   [facility] facility patient discharged from? [place]   Is LACE score < 7 ? No   Eligibility Not Eligible   What are the reasons patient is not eligible? [facility]   Does the patient have one of the following disease processes/diagnoses(primary or secondary)? Other   Prep survey completed? Yes            KAITLYN WAN - Registered Nurse

## 2024-10-20 NOTE — CASE MANAGEMENT/SOCIAL WORK
Case Management Discharge Note      Final Note: home to [facility] house per charting    Provided Post Acute Provider List?: Yes  Post Acute Provider List: Nursing Home  N/A Provider List Comment: Current with [facility]   Provided Post Acute Provider Quality & Resource List?: Yes  Post Acute Provider Quality and Resource List: Nursing Home  Delivered To: Patient  Method of Delivery: In person    Selected Continued Care - Discharged on 10/19/2024 Admission date: 10/10/2024 - Discharge disposition: Home or Self Care      Destination Coordination complete.      Service Provider Services Address Phone Fax Patient Preferred    [facility] [address] [address] [phone] --              Durable Medical Equipment    No services have been selected for the patient.                Dialysis/Infusion    No services have been selected for the patient.                Home Medical Care Coordination complete.      Service Provider Services Address Phone Fax Patient Preferred    [facility] [address], [address] [address] [phone]        Internal Comment last updated by Alon Hernandez, RN 10/11/2024 1015    Patient is current with [facility]. HEENA Ruggiero RN                         Therapy    No services have been selected for the patient.                Community Resources    No services have been selected for the patient.                Community & DME    No services have been selected for the patient.                    Selected Continued Care - Episodes Includes continued care and service providers with selected services from the active episodes listed below      [facility] Episode start date: 9/10/2024   There are no active outsourced providers for this episode.             Chronic Care Management Episode start date: 7/31/2024 (Paused)   There are no active outsourced providers  for this episode.                 Selected Continued Care - Prior Encounters Includes continued care and service providers with selected services from prior encounters from 7/12/2024 to 10/19/2024      Discharged on 9/4/2024 Admission date: 8/24/2024 - Discharge disposition: Skilled Nursing Facility (DC - External)      Destination       Service Provider Services Address Phone Fax Patient Preferred    [facility] [address] [phone] --                          Transportation Services  Private: Car    Final Discharge Disposition Code: 01 - home or self-care

## 2024-10-21 ENCOUNTER — TELEPHONE (OUTPATIENT)
Dept: CASE MANAGEMENT | Facility: OTHER | Age: 84
End: 2024-10-21
Payer: MEDICARE

## 2024-10-22 ENCOUNTER — PATIENT OUTREACH (OUTPATIENT)
Dept: CASE MANAGEMENT | Facility: OTHER | Age: 84
End: 2024-10-22
Payer: MEDICARE

## 2024-10-22 ENCOUNTER — TELEPHONE (OUTPATIENT)
Dept: FAMILY MEDICINE CLINIC | Facility: CLINIC | Age: 84
End: 2024-10-22
Payer: MEDICARE

## 2024-10-22 DIAGNOSIS — L03.116 BILATERAL CELLULITIS OF LOWER LEG: ICD-10-CM

## 2024-10-22 DIAGNOSIS — L03.115 BILATERAL CELLULITIS OF LOWER LEG: ICD-10-CM

## 2024-10-22 DIAGNOSIS — Z79.4 TYPE 2 DIABETES MELLITUS WITH HYPERGLYCEMIA, WITH LONG-TERM CURRENT USE OF INSULIN: ICD-10-CM

## 2024-10-22 DIAGNOSIS — I50.9 ACUTE CONGESTIVE HEART FAILURE, UNSPECIFIED HEART FAILURE TYPE: Primary | ICD-10-CM

## 2024-10-22 DIAGNOSIS — E11.65 TYPE 2 DIABETES MELLITUS WITH HYPERGLYCEMIA, WITH LONG-TERM CURRENT USE OF INSULIN: ICD-10-CM

## 2024-10-22 NOTE — DISCHARGE SUMMARY
Patient Name: Halie Guidry  : 1940  MRN: [id number]    Date of Admission: 10/10/2024  Date of Discharge:  10/22/2024  Primary Care Physician: Napoleon Mead MD      Chief Complaint:   Hyperglycemia      Discharge Diagnoses     Active Hospital Problems    Diagnosis  POA    **CKD (chronic kidney disease) stage 4, GFR 15-29 ml/min [N18.4]  Yes    Acute UTI (urinary tract infection) [N39.0]  Yes    Metabolic acidosis [E87.20]  Yes    Bilateral cellulitis of lower leg [L03.116, L03.115]  Yes    Bilateral lower extremity edema [R60.0]  Yes    Anxiety disorder [F41.9]  Yes    Neuropathic pain [M79.2]  Yes    Acute kidney injury (VIPUL) with acute tubular necrosis (ATN) [N17.0]  Yes    Severe hypothyroidism [E03.9]  Yes    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        Hospital Course     Ms. Guidry is a 84 y.o. female with a history of *** who presented to [facility] initially complaining of ***.  Please see the admitting history and physical for further details.  She was found to have *** and was admitted to the hospital for further evaluation and treatment.  ***        Day of Discharge     Subjective:  ***    Physical Exam:     Body mass index is 36.03 kg/m².  Physical Exam    Consultants     Consult Orders (all) (From admission, onward)       Start     Ordered    10/12/24 1542  Hematology & Oncology Inpatient Consult  Once,   Status:  Canceled        Specialty:  Hematology and Oncology  Provider:  Napoleon Gutierrez MD    10/12/24 1541    10/12/24 1258  Inpatient Infectious Diseases Consult  Once        Specialty:  Infectious Diseases  Provider:  Lei Rodriguez MD    10/12/24 1258    10/11/24 1050  Inpatient Nephrology Consult  Once        Specialty:  Nephrology   Provider:  Mary Rice MD    10/11/24 1051    10/10/24 1946  Diabetes Educator Consult  Once        Provider:  (Not yet assigned)    10/10/24 1945    10/10/24 1433  [facility] (on-call MD unless specified) Details  Once,   Status:  Canceled        Specialty:  Hospitalist  Provider:  Kim Barnard MD    10/10/24 1432                  Procedures     * Surgery not found *    Imaging Results (All)       None          Results for orders placed during the hospital encounter of 10/10/24    Duplex Venous Lower Extremity - Bilateral CAR    Interpretation Summary    Normal bilateral lower extremity venous duplex scan.    Results for orders placed during the hospital encounter of 08/24/24    Adult Transthoracic Echo Complete W/ Cont if Necessary Per Protocol    Interpretation Summary    Left ventricular systolic function is normal. Calculated left ventricular EF = 61.8% Normal left ventricular cavity size noted. Left ventricular wall thickness is consistent with moderate concentric hypertrophy. All left ventricular wall segments contract normally. Left ventricular diastolic function was normal.    The left atrial cavity is moderately dilated.    There is moderate calcification of the aortic valve. The aortic valve appears trileaflet. Trace aortic valve regurgitation is present. No aortic valve stenosis is present.    Moderate mitral annular calcification is present. Mild mitral valve regurgitation is present with multiple jets noted. No significant mitral valve stenosis is present.    Pertinent Labs     Results from last 7 days   Lab Units 10/19/24  0701 10/18/24  0627 10/17/24  0806 10/16/24  0435   WBC 10*3/mm3 7.10 8.00 8.78 7.43   HEMOGLOBIN g/dL 9.1* 9.4* 9.2* 8.9*   PLATELETS 10*3/mm3 282 294 289 299     Results from last 7 days   Lab Units 10/19/24  0701 10/18/24  0627 10/17/24  0806 10/16/24  0435   SODIUM mmol/L 135* 134* 134* 130*   POTASSIUM mmol/L 4.7 4.9 5.3* 4.9   CHLORIDE mmol/L 103 99 99 97*   CO2  "mmol/L 23.4 25.1 22.4 20.8*   BUN mg/dL 69* 66* 78* 85*   CREATININE mg/dL 2.17* 2.44* 2.61* 3.07*   GLUCOSE mg/dL 204* 239* 258* 241*   EGFR mL/min/1.73 22.0* 19.1* 17.6* 14.5*     Results from last 7 days   Lab Units 10/19/24  0701 10/18/24  0627 10/17/24  0806 10/16/24  0435   ALBUMIN g/dL 3.3* 3.2* 3.2* 3.1*     Results from last 7 days   Lab Units 10/19/24  0701 10/18/24  0627 10/17/24  0806 10/16/24  0435   CALCIUM mg/dL 8.9 8.8 8.6 8.6   ALBUMIN g/dL 3.3* 3.2* 3.2* 3.1*   MAGNESIUM mg/dL 2.0 2.0 1.9 1.8   PHOSPHORUS mg/dL 4.6* 4.7* 5.9* 7.0*         Results from last 7 days   Lab Units 10/19/24  0701   URIC ACID mg/dL 10.5*         Invalid input(s): \"LDLCALC\"          Test Results Pending at Discharge     Pending Results       None              Discharge Details        Discharge Medications        Continue These Medications        Instructions Start Date   acetaminophen 325 MG tablet  Commonly known as: TYLENOL   650 mg, Oral, Every 6 Hours PRN      ammonium lactate 12 % cream  Commonly known as: AMLACTIN   1 Application, Topical, 2 Times Daily      atorvastatin 80 MG tablet  Commonly known as: LIPITOR   80 mg, Oral, Nightly      BD Pen Needle Naila 2nd Gen 32G X 4 MM misc  Generic drug: Insulin Pen Needle   Use 1 pen needle 4 (Four) Times a Day.      bisoprolol 5 MG tablet  Commonly known as: ZEBeta   5 mg, Oral, Daily      DULoxetine 30 MG capsule  Commonly known as: CYMBALTA   30 mg, Oral, Daily      ferrous sulfate 325 (65 FE) MG tablet   325 mg, Oral, Every Other Day      fluticasone 50 MCG/ACT nasal spray  Commonly known as: FLONASE   2 sprays, Nasal, Daily      HumaLOG KwikPen 100 UNIT/ML solution pen-injector  Generic drug: Insulin Lispro (1 Unit Dial)   15-20 Units, Subcutaneous, 3 Times Daily Before Meals      hydrALAZINE 50 MG tablet  Commonly known as: APRESOLINE   50 mg, Oral, Every 8 Hours Scheduled      hydrocortisone-bacitracin-zinc oxide-nystatin  Commonly known as: MAGIC BARRIER   1 " Application, Topical, 2 Times Daily      lansoprazole 30 MG capsule  Commonly known as: PREVACID   30 mg, Oral, Daily      levothyroxine 137 MCG tablet  Commonly known as: SYNTHROID, LEVOTHROID   137 mcg, Oral, Every Early Morning      octreotide 30 MG injection  Commonly known as: sandoSTATIN   30 mg, Intramuscular, Every 28 Days, Next dose 9/16/2024      pregabalin 75 MG capsule  Commonly known as: LYRICA   75 mg, Oral, 2 Times Daily      terazosin 1 MG capsule  Commonly known as: HYTRIN   1 mg, Oral, Nightly      Toujeo Max SoloStar 300 UNIT/ML solution pen-injector injection  Generic drug: Insulin Glargine (2 Unit Dial)   90 Units, Subcutaneous, Daily             Stop These Medications      bumetanide 2 MG tablet  Commonly known as: BUMEX     chlorthalidone 25 MG tablet  Commonly known as: HYGROTON     lisinopril 40 MG tablet  Commonly known as: PRINIVIL,ZESTRIL     spironolactone 50 MG tablet  Commonly known as: ALDACTONE              Allergies   Allergen Reactions    Sulfa Antibiotics Unknown - High Severity     Pt says it was a long time ago and I don't remember but it wasn't good.        Discharge Disposition:  Home or Self Care      Discharge Diet:  No active diet order      Discharge Activity: Activity as tolerated      CODE STATUS:    Code Status and Medical Interventions: No CPR (Do Not Attempt to Resuscitate); Limited Support; No intubation (DNI)   Ordered at: 10/14/24 0842     Medical Intervention Limits:    No intubation (DNI)     Code Status (Patient has no pulse and is not breathing):    No CPR (Do Not Attempt to Resuscitate)     Medical Interventions (Patient has pulse or is breathing):    Limited Support       Future Appointments   Date Time Provider Department Center   11/11/2024  3:00 PM LAB CHAIR 4 YOANA CAPELLANE  LAB [facility]   11/11/2024  3:30 PM INJECTION CHAIR [facility] CHAR  INFUS [facility]   12/3/2024 11:30 AM NAY PET ADMIN RM 1  NAY PET NAY   12/3/2024 12:45 PM NAY PET 1  NAY PET NAY    12/9/2024 12:30 PM LAB CHAIR 6 CBC KRESGE  LAB [facility]   12/9/2024  1:00 PM Napoleon Gutierrez MD [facility] CBC [facility]   12/9/2024  1:30 PM INJECTION CHAIR [facility] BH INFUS [facility]RE [facility]AG   12/10/2024  2:00 PM Lilia Orona APRN [facility] EN  NAY      Contact information for follow-up providers       Napoleon Mead MD .    Specialty: Family Medicine  Contact information:  [address]  [address]  [place] [address]  [phone]                       Contact information for after-discharge care       Destination       [facility] .    Service: Skilled Nursing  Contact information:  [address] [place] [address]  [phone]                     Home Medical Care       [facility] .    Service: Home Health Services  Contact information:  [address]  [place] [address] [phone]                                   Time Spent on Discharge:  Greater than 30 minutes      Savita Mccormick MD  [facility]  10/22/24  14:58 EDT

## 2024-10-22 NOTE — OUTREACH NOTE
AMBULATORY CASE MANAGEMENT NOTE    Names and Relationships of Patient/Support Persons: Contact: Halie Guidry; Relationship: Self -     Adult Patient Profile  Questions/Answers      Flowsheet Row Most Recent Value   Symptoms/Conditions Managed at Home genitourinary, peripheral/neurovascular   Barriers to Managing Health understanding health advice   Genitourinary Symptoms/Conditions chronic kidney disease  [Stage 4]   Genitourinary Management Strategies coping strategies, diet modification, fluid modification   Genitourinary Self-Management Outcome unsure   Genitourinary Comment discussed modifications to diet and hydration to prevent another UTI   Peripheral Neurovascular Symptoms/Conditions other (see comments)  [cellulitis]   Peripheral Neurovascular Management Strategies routine screening, activity   Peripheral Neurovascular Self-Management Outcome unsure   Peripheral Neurovascular Comment Ordering HH, patient requesting [facility], will need legs moisturized and wrapped daily (Cellulitis).  Elevation/ compression and movement        CCM Interim Update    Call to patient this afternoon, verified by name.    States that she did not go to rehab because she had a bad experience at the hospital.    Further stating that she had hallucinations while in the hospital and didn't want to experience that again.    States that she is doing better today but does need help wrapping her legs and requires assistance with strengthening.    Further states that she does not have any concerns or questions about her medications.      States that she has noticed that her blood sugars are lower than usual.    Care Coordination    [facility] was able to complete a thorough chart review prior to outreach. Providing active listening while completing telephonic assessment of current healthcare needs.    Providing reassurance and encouragement regarding needed home health, which she has requested [facility].      Thorough disease  education provided.    Next outreach has been scheduled.        Education Documentation  No documentation found.        Carol THORNTON  Ambulatory Case Management    10/22/2024, 15:31 EDT

## 2024-10-23 ENCOUNTER — TELEPHONE (OUTPATIENT)
Dept: CASE MANAGEMENT | Facility: OTHER | Age: 84
End: 2024-10-23
Payer: MEDICARE

## 2024-10-23 DIAGNOSIS — L03.115 BILATERAL CELLULITIS OF LOWER LEG: ICD-10-CM

## 2024-10-23 DIAGNOSIS — I50.9 ACUTE CONGESTIVE HEART FAILURE, UNSPECIFIED HEART FAILURE TYPE: Primary | ICD-10-CM

## 2024-10-23 DIAGNOSIS — M48.061 SPINAL STENOSIS OF LUMBAR REGION, UNSPECIFIED WHETHER NEUROGENIC CLAUDICATION PRESENT: ICD-10-CM

## 2024-10-23 DIAGNOSIS — R53.1 GENERALIZED WEAKNESS: ICD-10-CM

## 2024-10-23 DIAGNOSIS — L03.116 BILATERAL CELLULITIS OF LOWER LEG: ICD-10-CM

## 2024-10-23 DIAGNOSIS — G62.9 NEUROPATHY: ICD-10-CM

## 2024-10-23 LAB — BACTERIA ISLT: NORMAL

## 2024-10-23 NOTE — TELEPHONE ENCOUNTER
Requesting HH, discharged from [facility] on 10/18/24 was to transfer to SNF for rehab but declined, went home instead without assistance.

## 2024-10-25 ENCOUNTER — TELEPHONE (OUTPATIENT)
Dept: FAMILY MEDICINE CLINIC | Facility: CLINIC | Age: 84
End: 2024-10-25
Payer: MEDICARE

## 2024-10-25 ENCOUNTER — PATIENT OUTREACH (OUTPATIENT)
Dept: CASE MANAGEMENT | Facility: OTHER | Age: 84
End: 2024-10-25
Payer: MEDICARE

## 2024-10-25 DIAGNOSIS — L03.115 BILATERAL CELLULITIS OF LOWER LEG: ICD-10-CM

## 2024-10-25 DIAGNOSIS — I50.9 ACUTE CONGESTIVE HEART FAILURE, UNSPECIFIED HEART FAILURE TYPE: Primary | ICD-10-CM

## 2024-10-25 DIAGNOSIS — L03.116 BILATERAL CELLULITIS OF LOWER LEG: ICD-10-CM

## 2024-10-25 DIAGNOSIS — R53.1 GENERALIZED WEAKNESS: ICD-10-CM

## 2024-10-25 NOTE — TELEPHONE ENCOUNTER
Caller: [facility]    Relationship: Other    Best call back number: [phone]     What orders are you requesting (i.e. lab or imaging): PHYSICAL THERAPY    In what timeframe would the patient need to come in: NEXT WEEK    Where will you receive your lab/imaging services: [facility]    Additional notes: WILLIE STATES THAT PATIENT NEEDS TO HAVE ORDERS UPDATED TO DATE NEXT WEEK AS PATIENT CANNOT BE SEEN THIS WEEK.     PLEASE CALL TO DISCUSS AND ADVISE.

## 2024-10-25 NOTE — OUTREACH NOTE
AMBULATORY CASE MANAGEMENT NOTE    Names and Relationships of Patient/Support Persons: Contact: Halie Guidry; Relationship: Self  Contact: SERGE Gonzalez; Relationship: Other -     [facility] Interim Update    Call from SERGE Workman with [facility].    Stating that patient is in need of an electric wheelchair and was calling to check on status.  Made her aware of most recent outreach with [facility] and new referral placed to [facility].  Offered documentation, declined at this time d/t current status is under review.  We will f/u as needed for any further documentation, if needed.    No further needs.        Education Documentation  No documentation found.        Carol THORNTON  Ambulatory Case Management    10/25/2024, 16:52 EDT

## 2024-10-25 NOTE — OUTREACH NOTE
AMBULATORY CASE MANAGEMENT NOTE    Names and Relationships of Patient/Support Persons: Contact: Halie Guidry; Relationship: Self  Contact: SERGE Gonzalez; Relationship: Other -     [facility] Interim Update    Spoke with patient this afternoon, verified by name.    Made aware that office had received call from [employer] and discussed status of electric wheelchair.    Made patient aware that [facility] will be reaching out to scheduled.    Further discussed statements regarding scheduling of hospital follow up and wound care.  Reminded patient that she had originally declined f/u visit at the time of our last telephonic conversation.  Patient stated that the issue had not happened on our offices end, but on the hospitals end.  Further stating that she has received quick assistance with all healthcare needs since discharge.    Patient expressing gratitude for ACM and provider assistance since discharge from hospital.    Agreeable to scheduling a hospital f/u but not today.  Next outreach has been scheduled.            Education Documentation  No documentation found.        Carol THORNTON  Ambulatory Case Management    10/25/2024, 16:57 EDT

## 2024-10-25 NOTE — TELEPHONE ENCOUNTER
uJly Mullen from [place] called, pt is getting discharged from hospital.Pt denied HFU appt, and wound care. Its recommended that she have CMP drawn 1 week post discharge, and they would like wound care to be available to her in the home for her legs. They are requesting Dr. Mead order these things, and have the orders faxed to [facility]. They will be going to her home Tuesday, 10-29-24.

## 2024-10-28 ENCOUNTER — TELEPHONE (OUTPATIENT)
Dept: CASE MANAGEMENT | Facility: OTHER | Age: 84
End: 2024-10-28
Payer: MEDICARE

## 2024-10-28 ENCOUNTER — TELEPHONE (OUTPATIENT)
Dept: FAMILY MEDICINE CLINIC | Facility: CLINIC | Age: 84
End: 2024-10-28

## 2024-10-28 NOTE — TELEPHONE ENCOUNTER
Hub staff attempted to follow warm transfer process and was unsuccessful     Caller: [facility]    Relationship to patient:     Best call back number: [phone]     Patient is needing: CALLER IS REQUESTING VERBAL ORDERS FOR  PHYSICAL THERAPY TWICE A WEEK FOR TWO AND A RE CERT.

## 2024-10-29 ENCOUNTER — PATIENT OUTREACH (OUTPATIENT)
Dept: CASE MANAGEMENT | Facility: OTHER | Age: 84
End: 2024-10-29
Payer: MEDICARE

## 2024-10-29 ENCOUNTER — TELEPHONE (OUTPATIENT)
Dept: CASE MANAGEMENT | Facility: OTHER | Age: 84
End: 2024-10-29
Payer: MEDICARE

## 2024-10-29 ENCOUNTER — TELEPHONE (OUTPATIENT)
Dept: FAMILY MEDICINE CLINIC | Facility: CLINIC | Age: 84
End: 2024-10-29
Payer: MEDICARE

## 2024-10-29 DIAGNOSIS — S49.90XS SHOULDER INJURY, UNSPECIFIED LATERALITY, SEQUELA: ICD-10-CM

## 2024-10-29 DIAGNOSIS — R53.1 GENERALIZED WEAKNESS: Primary | ICD-10-CM

## 2024-10-29 DIAGNOSIS — L03.116 BILATERAL CELLULITIS OF LOWER LEG: ICD-10-CM

## 2024-10-29 DIAGNOSIS — L03.115 BILATERAL CELLULITIS OF LOWER LEG: ICD-10-CM

## 2024-10-29 NOTE — TELEPHONE ENCOUNTER
Spoke with patient she has declined to schedule her hospital follow-up stated that she seen Dr. Mead before she went into the hospital and doesn't feel the need to follow-up

## 2024-10-29 NOTE — TELEPHONE ENCOUNTER
Message left that wheelchair referral has been resubmitted to [facility].  Made patient aware that they will be following up.

## 2024-10-29 NOTE — OUTREACH NOTE
AMBULATORY CASE MANAGEMENT NOTE    Names and Relationships of Patient/Support Persons: Contact: Halie Guidry; Relationship: Self -     CCM Interim Update    Spoke with patient this morning, verified by name.    Several attempts to speak with someone with [facility] without a return call.    New referral placed to [facility] for electric wheelchair evaluation.      Patient was made aware and clearly communicated that she understood that [facility] would be calling her to schedule an evaluation.    Next outreach has been scheduled.        Education Documentation  No documentation found.        Carol THORNTON  Ambulatory Case Management    10/29/2024, 10:15 EDT

## 2024-10-30 ENCOUNTER — TELEPHONE (OUTPATIENT)
Dept: FAMILY MEDICINE CLINIC | Facility: CLINIC | Age: 84
End: 2024-10-30
Payer: MEDICARE

## 2024-10-30 NOTE — TELEPHONE ENCOUNTER
ORDERS FOR POWER WHEELCHAIR FAXED TO  [phone] CONFIRMATION ATTACHED  DEMOGRAPHIC FORMS ALSO FAXED WITH ORDER  HESHAM BRUNO ADVISED

## 2024-10-30 NOTE — TELEPHONE ENCOUNTER
Becky called for verbal order for PT, 2 times for 2 weeks, and a recert. Per notes on 10/28/24 Dr. Mead said it is ok to give verbal. Gave verbal orders.

## 2024-10-31 ENCOUNTER — PATIENT OUTREACH (OUTPATIENT)
Dept: CASE MANAGEMENT | Facility: OTHER | Age: 84
End: 2024-10-31
Payer: MEDICARE

## 2024-10-31 ENCOUNTER — TELEPHONE (OUTPATIENT)
Dept: FAMILY MEDICINE CLINIC | Facility: CLINIC | Age: 84
End: 2024-10-31
Payer: MEDICARE

## 2024-10-31 DIAGNOSIS — Z79.4 TYPE 2 DIABETES MELLITUS WITH HYPERGLYCEMIA, WITH LONG-TERM CURRENT USE OF INSULIN: ICD-10-CM

## 2024-10-31 DIAGNOSIS — S49.90XS SHOULDER INJURY, UNSPECIFIED LATERALITY, SEQUELA: ICD-10-CM

## 2024-10-31 DIAGNOSIS — L03.115 BILATERAL CELLULITIS OF LOWER LEG: Primary | ICD-10-CM

## 2024-10-31 DIAGNOSIS — E11.65 TYPE 2 DIABETES MELLITUS WITH HYPERGLYCEMIA, WITH LONG-TERM CURRENT USE OF INSULIN: ICD-10-CM

## 2024-10-31 DIAGNOSIS — I50.9 ACUTE CONGESTIVE HEART FAILURE, UNSPECIFIED HEART FAILURE TYPE: ICD-10-CM

## 2024-10-31 DIAGNOSIS — L03.116 BILATERAL CELLULITIS OF LOWER LEG: Primary | ICD-10-CM

## 2024-10-31 DIAGNOSIS — E13.43: ICD-10-CM

## 2024-10-31 NOTE — TELEPHONE ENCOUNTER
Caller: [facility]    Relationship: [facility]    Best call back number:     What orders are you requesting (i.e. lab or imaging): OT VERBAL ORDER FOR ONCE A WEEK FOR 4 WEEKS     In what timeframe would the patient need to come in: 10.31.24-11.01.24    Where will you receive your lab/imaging services: BY PHONE    Additional notes:       .”

## 2024-10-31 NOTE — OUTREACH NOTE
[facility] End of Month Documentation    This Chronic Medical Management Care Plan for Halie Guidry, 84 y.o. female, has been monitored and managed; reviewed and a new plan of care implemented for the month of October.  A cumulative time of 99  minutes was spent on this patient record this month, including phone call with patient; chart review; electronic communication with primary care provider, need for HH/ PT/OT and SN for leg wrapping.    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; Severe hypothyroidism; Noncompliance with medication regimen; Vitamin D deficiency disease; Tremor; Seizure; Thoracic degenerative disc disease; Acute kidney injury (VIPUL) with acute tubular necrosis (ATN); Hyperglycemia; Type II diabetes mellitus, uncontrolled; Lower abdominal pain; Transaminitis; Emphysematous cystitis; Choledocholithiasis; Hypokalemia; Hypoxia; Generalized abdominal pain; History of Clostridium difficile infection; History of ERCP; Hypomagnesemia; Anxiety disorder; Neuropathic pain; Intertrigo; Weakness of both lower extremities; Accelerated hypertension; Acute cystitis without hematuria; Left lower lobe pneumonia; Cellulitis and abscess of left lower extremity; Benign hypertension with CKD (chronic kidney disease) stage IV; Peripheral edema; Primary malignant neuroendocrine tumor of pancreas; Encounter for long-term (current) use of high-risk medication; Diabetic foot ulcer; Stage 3a chronic kidney disease; Pressure injury of buttock, stage 2; HTN (hypertension); Anemia due to chronic  kidney disease; Bilateral lower extremity edema; Cellulitis of right lower extremity; CKD (chronic kidney disease) stage 4, GFR 15-29 ml/min; Acute CHF; Bilateral cellulitis of lower leg; Acute UTI; UTI (urinary tract infection); Acute UTI (urinary tract infection); Metabolic acidosis; Cobalamin deficiency; Dependent edema; Gastroesophageal reflux disease; Mild neurocognitive disorder; Pancreatic neoplasm; and Congestive heart failure on their problem list., the following items were addressed: medical records; medications, Care coordination/ diesease education and any changes can be found within the plan section of the note.  A detailed listing of time spent for chronic care management is tracked within each outreach encounter.  Current medications include:  has a current medication list which includes the following prescription(s): acetaminophen, ammonium lactate, atorvastatin, bisoprolol, duloxetine, ferrous sulfate, fluticasone, hydralazine, hydrocortisone-bacitracin-zinc oxide-nystatin, insulin glargine (2 unit dial), insulin lispro (1 unit dial), bd pen needle blayne 2nd gen, lansoprazole, levothyroxine, octreotide, pregabalin, and terazosin. and the patient is reported to be patient is compliant with medication protocol,  Medications are reported to be effective.  Regarding these diagnoses, referrals were made to the following provider(s):  Home Health/ DME.  All notes on chart for PCP to review.    The patient was monitored remotely for pain; activity level, hydration and dietary modifications.    The patient's physical needs include:  DME supplies; physical healthcare, would like an electric wheelchair.     The patient's mental support needs include:  not applicable, non-compliant with recommended treatments    The patient's cognitive support needs include:  not applicable    The patient's psychosocial support needs include:  not applicable    The patient's functional needs include: DME; physical healthcare,  Referral to [facility] for electric wheelchair    The patient's environmental needs include:  not applicable, na    Care Plan overall comments:  No data recorded    Refer to previous outreach notes for more information on the areas listed above.    Monthly Billing Diagnoses  (L03.116,  L03.115) Bilateral cellulitis of lower leg    (I50.9) Acute congestive heart failure, unspecified heart failure type    (E11.65,  Z79.4) Type 2 diabetes mellitus with hyperglycemia, with long-term current use of insulin    (S49.90XS) Shoulder injury, unspecified laterality, sequela    (E13.43) 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    Medications   Medications have been reconciled    Care Plan progress this month:      Recently Modified Care Plans Updates made since 9/30/2024 12:00 AM      No recently modified care plans.          Instructions   Patient was provided an electronic copy of care plan  CCM services were explained and offered and patient has accepted these services.  Patient has given their written consent to receive CCM services and understands that this includes the authorization of electronic communication of medical information with the other treating providers.  Patient understands that they may stop CCM services at any time and these changes will be effective at the end of the calendar month and will effectively revocate the agreement of CCM services.  Patient understands that only one practitioner can furnish and be paid for CCM services during one calendar month.  Patient also understands that there may be co-payment or deductible fees in association with CCM services.  Patient will continue with at least monthly follow-up calls with the Ambulatory .    Carol THORNTON  Ambulatory Case Management    10/31/2024, 12:53 EDT

## 2024-10-31 NOTE — TELEPHONE ENCOUNTER
LDTVM advising that PCP agreed with order for OT. Instructed to return call with further questions.

## 2024-11-05 ENCOUNTER — HOSPITAL ENCOUNTER (INPATIENT)
Facility: HOSPITAL | Age: 84
LOS: 6 days | Discharge: HOME | DRG: 291 | End: 2024-11-11
Attending: EMERGENCY MEDICINE | Admitting: INTERNAL MEDICINE
Payer: MEDICARE

## 2024-11-05 ENCOUNTER — TELEPHONE (OUTPATIENT)
Dept: CASE MANAGEMENT | Facility: OTHER | Age: 84
End: 2024-11-05
Payer: MEDICARE

## 2024-11-05 ENCOUNTER — TELEPHONE (OUTPATIENT)
Dept: FAMILY MEDICINE CLINIC | Facility: CLINIC | Age: 84
End: 2024-11-05

## 2024-11-05 ENCOUNTER — APPOINTMENT (OUTPATIENT)
Dept: GENERAL RADIOLOGY | Facility: HOSPITAL | Age: 84
DRG: 291 | End: 2024-11-05
Payer: MEDICARE

## 2024-11-05 DIAGNOSIS — R60.0 BILATERAL LEG EDEMA: ICD-10-CM

## 2024-11-05 DIAGNOSIS — I50.9 ACUTE ON CHRONIC CONGESTIVE HEART FAILURE, UNSPECIFIED HEART FAILURE TYPE: Primary | ICD-10-CM

## 2024-11-05 LAB
ALBUMIN SERPL-MCNC: 3.4 G/DL (ref 3.5–5.2)
ALBUMIN/GLOB SERPL: 0.9 G/DL
ALP SERPL-CCNC: 111 U/L (ref 39–117)
ALT SERPL W P-5'-P-CCNC: 5 U/L (ref 1–33)
ANION GAP SERPL CALCULATED.3IONS-SCNC: 11.6 MMOL/L (ref 5–15)
AST SERPL-CCNC: 8 U/L (ref 1–32)
BACTERIA UR QL AUTO: ABNORMAL /HPF
BASOPHILS # BLD AUTO: 0.08 10*3/MM3 (ref 0–0.2)
BASOPHILS NFR BLD AUTO: 0.6 % (ref 0–1.5)
BILIRUB SERPL-MCNC: 0.4 MG/DL (ref 0–1.2)
BILIRUB UR QL STRIP: NEGATIVE
BUN SERPL-MCNC: 35 MG/DL (ref 8–23)
BUN/CREAT SERPL: 16.6 (ref 7–25)
CALCIUM SPEC-SCNC: 8.5 MG/DL (ref 8.6–10.5)
CHLORIDE SERPL-SCNC: 111 MMOL/L (ref 98–107)
CLARITY UR: ABNORMAL
CO2 SERPL-SCNC: 18.4 MMOL/L (ref 22–29)
COLOR UR: YELLOW
CREAT SERPL-MCNC: 2.11 MG/DL (ref 0.57–1)
DEPRECATED RDW RBC AUTO: 50.1 FL (ref 37–54)
EGFRCR SERPLBLD CKD-EPI 2021: 22.7 ML/MIN/1.73
EOSINOPHIL # BLD AUTO: 0.32 10*3/MM3 (ref 0–0.4)
EOSINOPHIL NFR BLD AUTO: 2.6 % (ref 0.3–6.2)
ERYTHROCYTE [DISTWIDTH] IN BLOOD BY AUTOMATED COUNT: 15.4 % (ref 12.3–15.4)
GEN 5 2HR TROPONIN T REFLEX: 53 NG/L
GLOBULIN UR ELPH-MCNC: 4 GM/DL
GLUCOSE BLDC GLUCOMTR-MCNC: 143 MG/DL (ref 70–130)
GLUCOSE SERPL-MCNC: 154 MG/DL (ref 65–99)
GLUCOSE UR STRIP-MCNC: NEGATIVE MG/DL
HCT VFR BLD AUTO: 27.6 % (ref 34–46.6)
HGB BLD-MCNC: 8.4 G/DL (ref 12–15.9)
HGB UR QL STRIP.AUTO: NEGATIVE
HOLD SPECIMEN: NORMAL
HOLD SPECIMEN: NORMAL
HYALINE CASTS UR QL AUTO: ABNORMAL /LPF
IMM GRANULOCYTES # BLD AUTO: 0.07 10*3/MM3 (ref 0–0.05)
IMM GRANULOCYTES NFR BLD AUTO: 0.6 % (ref 0–0.5)
KETONES UR QL STRIP: NEGATIVE
LEUKOCYTE ESTERASE UR QL STRIP.AUTO: ABNORMAL
LYMPHOCYTES # BLD AUTO: 0.7 10*3/MM3 (ref 0.7–3.1)
LYMPHOCYTES NFR BLD AUTO: 5.7 % (ref 19.6–45.3)
MAGNESIUM SERPL-MCNC: 1.9 MG/DL (ref 1.6–2.4)
MCH RBC QN AUTO: 27.1 PG (ref 26.6–33)
MCHC RBC AUTO-ENTMCNC: 30.4 G/DL (ref 31.5–35.7)
MCV RBC AUTO: 89 FL (ref 79–97)
MONOCYTES # BLD AUTO: 1.01 10*3/MM3 (ref 0.1–0.9)
MONOCYTES NFR BLD AUTO: 8.2 % (ref 5–12)
NEUTROPHILS NFR BLD AUTO: 10.15 10*3/MM3 (ref 1.7–7)
NEUTROPHILS NFR BLD AUTO: 82.3 % (ref 42.7–76)
NITRITE UR QL STRIP: NEGATIVE
NRBC BLD AUTO-RTO: 0 /100 WBC (ref 0–0.2)
NT-PROBNP SERPL-MCNC: 6353 PG/ML (ref 0–1800)
PH UR STRIP.AUTO: 6 [PH] (ref 5–8)
PLATELET # BLD AUTO: 300 10*3/MM3 (ref 140–450)
PMV BLD AUTO: 9.9 FL (ref 6–12)
POTASSIUM SERPL-SCNC: 4.4 MMOL/L (ref 3.5–5.2)
PROT SERPL-MCNC: 7.4 G/DL (ref 6–8.5)
PROT UR QL STRIP: ABNORMAL
QT INTERVAL: 487 MS
QTC INTERVAL: 519 MS
RBC # BLD AUTO: 3.1 10*6/MM3 (ref 3.77–5.28)
RBC # UR STRIP: ABNORMAL /HPF
REF LAB TEST METHOD: ABNORMAL
SODIUM SERPL-SCNC: 141 MMOL/L (ref 136–145)
SP GR UR STRIP: 1.02 (ref 1–1.03)
SQUAMOUS #/AREA URNS HPF: ABNORMAL /HPF
TROPONIN T DELTA: -1 NG/L
TROPONIN T SERPL HS-MCNC: 54 NG/L
UROBILINOGEN UR QL STRIP: ABNORMAL
WBC # UR STRIP: ABNORMAL /HPF
WBC NRBC COR # BLD AUTO: 12.33 10*3/MM3 (ref 3.4–10.8)
WHOLE BLOOD HOLD COAG: NORMAL
WHOLE BLOOD HOLD SPECIMEN: NORMAL

## 2024-11-05 PROCEDURE — 99285 EMERGENCY DEPT VISIT HI MDM: CPT

## 2024-11-05 PROCEDURE — 84300 ASSAY OF URINE SODIUM: CPT

## 2024-11-05 PROCEDURE — 83735 ASSAY OF MAGNESIUM: CPT | Performed by: EMERGENCY MEDICINE

## 2024-11-05 PROCEDURE — 82436 ASSAY OF URINE CHLORIDE: CPT

## 2024-11-05 PROCEDURE — 71045 X-RAY EXAM CHEST 1 VIEW: CPT

## 2024-11-05 PROCEDURE — 83880 ASSAY OF NATRIURETIC PEPTIDE: CPT | Performed by: EMERGENCY MEDICINE

## 2024-11-05 PROCEDURE — 93005 ELECTROCARDIOGRAM TRACING: CPT | Performed by: EMERGENCY MEDICINE

## 2024-11-05 PROCEDURE — 36415 COLL VENOUS BLD VENIPUNCTURE: CPT

## 2024-11-05 PROCEDURE — 87481 CANDIDA DNA AMP PROBE: CPT | Performed by: INTERNAL MEDICINE

## 2024-11-05 PROCEDURE — 82570 ASSAY OF URINE CREATININE: CPT

## 2024-11-05 PROCEDURE — 82948 REAGENT STRIP/BLOOD GLUCOSE: CPT

## 2024-11-05 PROCEDURE — 80053 COMPREHEN METABOLIC PANEL: CPT | Performed by: EMERGENCY MEDICINE

## 2024-11-05 PROCEDURE — 84156 ASSAY OF PROTEIN URINE: CPT

## 2024-11-05 PROCEDURE — 25010000002 FUROSEMIDE PER 20 MG: Performed by: EMERGENCY MEDICINE

## 2024-11-05 PROCEDURE — 84484 ASSAY OF TROPONIN QUANT: CPT | Performed by: EMERGENCY MEDICINE

## 2024-11-05 PROCEDURE — 85025 COMPLETE CBC W/AUTO DIFF WBC: CPT | Performed by: EMERGENCY MEDICINE

## 2024-11-05 PROCEDURE — 81001 URINALYSIS AUTO W/SCOPE: CPT | Performed by: EMERGENCY MEDICINE

## 2024-11-05 PROCEDURE — 93010 ELECTROCARDIOGRAM REPORT: CPT | Performed by: INTERNAL MEDICINE

## 2024-11-05 RX ORDER — INSULIN LISPRO 100 [IU]/ML
15 INJECTION, SOLUTION INTRAVENOUS; SUBCUTANEOUS
Status: DISCONTINUED | OUTPATIENT
Start: 2024-11-06 | End: 2024-11-11 | Stop reason: HOSPADM

## 2024-11-05 RX ORDER — HYDRALAZINE HYDROCHLORIDE 50 MG/1
50 TABLET, FILM COATED ORAL EVERY 8 HOURS SCHEDULED
Status: DISCONTINUED | OUTPATIENT
Start: 2024-11-05 | End: 2024-11-11 | Stop reason: HOSPADM

## 2024-11-05 RX ORDER — TERAZOSIN 1 MG/1
1 CAPSULE ORAL NIGHTLY
Status: DISCONTINUED | OUTPATIENT
Start: 2024-11-05 | End: 2024-11-11 | Stop reason: HOSPADM

## 2024-11-05 RX ORDER — NICOTINE POLACRILEX 4 MG
15 LOZENGE BUCCAL
Status: DISCONTINUED | OUTPATIENT
Start: 2024-11-05 | End: 2024-11-11 | Stop reason: HOSPADM

## 2024-11-05 RX ORDER — FLUTICASONE PROPIONATE 50 MCG
2 SPRAY, SUSPENSION (ML) NASAL DAILY
Status: DISCONTINUED | OUTPATIENT
Start: 2024-11-06 | End: 2024-11-11 | Stop reason: HOSPADM

## 2024-11-05 RX ORDER — IBUPROFEN 600 MG/1
1 TABLET ORAL
Status: DISCONTINUED | OUTPATIENT
Start: 2024-11-05 | End: 2024-11-11 | Stop reason: HOSPADM

## 2024-11-05 RX ORDER — AMOXICILLIN 250 MG
2 CAPSULE ORAL 2 TIMES DAILY PRN
Status: DISCONTINUED | OUTPATIENT
Start: 2024-11-05 | End: 2024-11-11 | Stop reason: HOSPADM

## 2024-11-05 RX ORDER — FERROUS SULFATE 325(65) MG
325 TABLET ORAL EVERY OTHER DAY
Status: DISCONTINUED | OUTPATIENT
Start: 2024-11-07 | End: 2024-11-11 | Stop reason: HOSPADM

## 2024-11-05 RX ORDER — ACETAMINOPHEN 160 MG/5ML
650 SOLUTION ORAL EVERY 4 HOURS PRN
Status: DISCONTINUED | OUTPATIENT
Start: 2024-11-05 | End: 2024-11-11 | Stop reason: HOSPADM

## 2024-11-05 RX ORDER — DULOXETIN HYDROCHLORIDE 30 MG/1
30 CAPSULE, DELAYED RELEASE ORAL DAILY
Status: DISCONTINUED | OUTPATIENT
Start: 2024-11-06 | End: 2024-11-11 | Stop reason: HOSPADM

## 2024-11-05 RX ORDER — SODIUM CHLORIDE 0.9 % (FLUSH) 0.9 %
10 SYRINGE (ML) INJECTION AS NEEDED
Status: DISCONTINUED | OUTPATIENT
Start: 2024-11-05 | End: 2024-11-11 | Stop reason: HOSPADM

## 2024-11-05 RX ORDER — FUROSEMIDE 10 MG/ML
80 INJECTION INTRAMUSCULAR; INTRAVENOUS EVERY 12 HOURS
Status: DISCONTINUED | OUTPATIENT
Start: 2024-11-06 | End: 2024-11-05

## 2024-11-05 RX ORDER — PANTOPRAZOLE SODIUM 40 MG/1
40 TABLET, DELAYED RELEASE ORAL
Status: DISCONTINUED | OUTPATIENT
Start: 2024-11-06 | End: 2024-11-11 | Stop reason: HOSPADM

## 2024-11-05 RX ORDER — BISACODYL 10 MG
10 SUPPOSITORY, RECTAL RECTAL DAILY PRN
Status: DISCONTINUED | OUTPATIENT
Start: 2024-11-05 | End: 2024-11-11 | Stop reason: HOSPADM

## 2024-11-05 RX ORDER — PREGABALIN 75 MG/1
75 CAPSULE ORAL 2 TIMES DAILY
Status: DISCONTINUED | OUTPATIENT
Start: 2024-11-05 | End: 2024-11-11 | Stop reason: HOSPADM

## 2024-11-05 RX ORDER — BISACODYL 5 MG/1
5 TABLET, DELAYED RELEASE ORAL DAILY PRN
Status: DISCONTINUED | OUTPATIENT
Start: 2024-11-05 | End: 2024-11-11 | Stop reason: HOSPADM

## 2024-11-05 RX ORDER — POLYETHYLENE GLYCOL 3350 17 G/17G
17 POWDER, FOR SOLUTION ORAL DAILY PRN
Status: DISCONTINUED | OUTPATIENT
Start: 2024-11-05 | End: 2024-11-11 | Stop reason: HOSPADM

## 2024-11-05 RX ORDER — DEXTROSE MONOHYDRATE 25 G/50ML
25 INJECTION, SOLUTION INTRAVENOUS
Status: DISCONTINUED | OUTPATIENT
Start: 2024-11-05 | End: 2024-11-11 | Stop reason: HOSPADM

## 2024-11-05 RX ORDER — LEVOTHYROXINE SODIUM 137 UG/1
137 TABLET ORAL
Status: DISCONTINUED | OUTPATIENT
Start: 2024-11-06 | End: 2024-11-11 | Stop reason: HOSPADM

## 2024-11-05 RX ORDER — BISOPROLOL FUMARATE 5 MG/1
5 TABLET, FILM COATED ORAL DAILY
Status: DISCONTINUED | OUTPATIENT
Start: 2024-11-06 | End: 2024-11-11 | Stop reason: HOSPADM

## 2024-11-05 RX ORDER — ONDANSETRON 4 MG/1
4 TABLET, ORALLY DISINTEGRATING ORAL EVERY 6 HOURS PRN
Status: DISCONTINUED | OUTPATIENT
Start: 2024-11-05 | End: 2024-11-11 | Stop reason: HOSPADM

## 2024-11-05 RX ORDER — FUROSEMIDE 10 MG/ML
80 INJECTION INTRAMUSCULAR; INTRAVENOUS ONCE
Status: COMPLETED | OUTPATIENT
Start: 2024-11-05 | End: 2024-11-05

## 2024-11-05 RX ORDER — ONDANSETRON 2 MG/ML
4 INJECTION INTRAMUSCULAR; INTRAVENOUS EVERY 6 HOURS PRN
Status: DISCONTINUED | OUTPATIENT
Start: 2024-11-05 | End: 2024-11-11 | Stop reason: HOSPADM

## 2024-11-05 RX ORDER — FUROSEMIDE 10 MG/ML
80 INJECTION INTRAMUSCULAR; INTRAVENOUS EVERY 12 HOURS
Status: DISCONTINUED | OUTPATIENT
Start: 2024-11-06 | End: 2024-11-06

## 2024-11-05 RX ORDER — ACETAMINOPHEN 650 MG/1
650 SUPPOSITORY RECTAL EVERY 4 HOURS PRN
Status: DISCONTINUED | OUTPATIENT
Start: 2024-11-05 | End: 2024-11-11 | Stop reason: HOSPADM

## 2024-11-05 RX ORDER — FUROSEMIDE 10 MG/ML
80 INJECTION INTRAMUSCULAR; INTRAVENOUS EVERY 12 HOURS
Status: DISCONTINUED | OUTPATIENT
Start: 2024-11-05 | End: 2024-11-05

## 2024-11-05 RX ORDER — ACETAMINOPHEN 325 MG/1
650 TABLET ORAL EVERY 4 HOURS PRN
Status: DISCONTINUED | OUTPATIENT
Start: 2024-11-05 | End: 2024-11-11 | Stop reason: HOSPADM

## 2024-11-05 RX ORDER — INSULIN LISPRO 100 [IU]/ML
2-7 INJECTION, SOLUTION INTRAVENOUS; SUBCUTANEOUS
Status: DISCONTINUED | OUTPATIENT
Start: 2024-11-06 | End: 2024-11-11 | Stop reason: HOSPADM

## 2024-11-05 RX ORDER — ATORVASTATIN CALCIUM 80 MG/1
80 TABLET, FILM COATED ORAL NIGHTLY
Status: DISCONTINUED | OUTPATIENT
Start: 2024-11-05 | End: 2024-11-11 | Stop reason: HOSPADM

## 2024-11-05 RX ADMIN — TERAZOSIN 1 MG: 1 CAPSULE ORAL at 23:47

## 2024-11-05 RX ADMIN — FUROSEMIDE 80 MG: 10 INJECTION, SOLUTION INTRAMUSCULAR; INTRAVENOUS at 19:27

## 2024-11-05 RX ADMIN — Medication 2.5 MG: at 23:46

## 2024-11-05 RX ADMIN — ATORVASTATIN CALCIUM 80 MG: 80 TABLET, FILM COATED ORAL at 23:46

## 2024-11-05 RX ADMIN — HYDRALAZINE HYDROCHLORIDE 50 MG: 50 TABLET ORAL at 23:47

## 2024-11-05 RX ADMIN — PREGABALIN 75 MG: 75 CAPSULE ORAL at 23:46

## 2024-11-05 NOTE — TELEPHONE ENCOUNTER
Spoke with patient son, and Emergency contact.  States that he has not heard from patient today.  Stating that patient doesn't usually answer her phone.  Continues that he will text her and have her call ACM regarding heart failure symptoms.

## 2024-11-05 NOTE — TELEPHONE ENCOUNTER
TERRI (NURSE) FROM [facility]   SHE SAID THE PT WAS DISCONTINUED FROM BUMETANIDE (BUMEX) 2MG TABLET, WANTS TO KNOW WILL DR. SILVA PLACE HER BACK ON IT AND PT IS CURRENTLY IS FLUID OVERLOAD, LAST BMP TAKEN LAST FRIDAY.PT IS CURRENTLY NAUSEOUS EDEMA SOB.    TERRI  [phone]

## 2024-11-05 NOTE — Clinical Note
Level of Care: Telemetry [5]  Diagnosis: CHF exacerbation [[id number]]  Admitting Physician: CASE VALDEZ [[id number]]  Attending Physician: CASE VALDEZ [[id number]]  Certification: I certify that inpatient services are medically necessary for thi s patient for a duration of greater than two midnights. See H&P and MD Progress Notes for additional information about the patient's course of treatment.

## 2024-11-05 NOTE — ED NOTES
Pt to ED from [facility] for swelling and pain in bilateral legs. Pt was taken off bumex last time she was admitted to the hospital.

## 2024-11-05 NOTE — ED NOTES
Ultrasound Inserted IV     Site: Left forearm     Catheter Length (in): 1.75 20G     Diameter(cm): 1.5      Depth(cm):0.9     Vascular Access Score=    5) Vein is poorly palpable or non-palpable and/or poorly or non-visible.     Angiocath visualized and advanced in the venous lumen. Flush visualized superiorly to insertion site in venous lumen. Blood return noted and collected during insertion. No signs of phlebitis noted. Standard IV dressing placed and secured.

## 2024-11-05 NOTE — ED PROVIDER NOTES
EMERGENCY DEPARTMENT ENCOUNTER    Room Number:  16/16  PCP: Napoleon Mead MD    HPI:  Chief Complaint: Leg swelling  A complete HPI/ROS/PMH/PSH/SH/FH are unobtainable due to: None  Context: Halie Guidry is a 84 y.o. female who presents to the ED c/o acute leg swelling.  States that she has been having progressively worsening leg swelling over the past month and she was taken off Bumex now extends up into her thighs.  She says she feels somewhat short of breath.  No fever or chest pain.        PAST MEDICAL HISTORY  Active Ambulatory Problems     Diagnosis Date Noted    Compression fracture 04/22/2009    Lumbar degenerative disc disease 07/05/2012    Type 2 diabetes mellitus with hyperglycemia, with long-term current use of insulin     Hyperlipidemia     Benign essential hypertension 08/20/2014    Primary hypothyroidism     Neuropathy     Osteoporosis 09/09/2015    Proteinuria 02/28/2012    Tobacco abuse 08/22/2013    Generalized weakness 05/27/2017    Spinal stenosis 05/27/2017    Scoliosis 05/27/2017    Arthritis 05/27/2017    VBI (vertebrobasilar insufficiency) 05/28/2017    Orthostatic hypotension 05/28/2017    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 05/28/2017    Severe hypothyroidism 05/30/2017    Noncompliance with medication regimen 05/30/2017    Vitamin D deficiency disease 06/01/2017    Tremor 01/09/2018    Seizure 05/21/2019    Thoracic degenerative disc disease 01/27/2020    Acute kidney injury (VIPUL) with acute tubular necrosis (ATN) 12/02/2021    Hyperglycemia 12/02/2021    Type II diabetes mellitus, uncontrolled 12/02/2021    Lower abdominal pain 02/23/2022    Transaminitis 02/23/2022    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    Choledocholithiasis 02/23/2022    Hypokalemia 02/24/2022    Hypoxia 02/24/2022    Generalized abdominal pain 03/26/2022    History of Clostridium difficile infection 03/26/2022    History of ERCP 03/26/2022    Hypomagnesemia 03/28/2022    Anxiety disorder  05/27/2022    Neuropathic pain 05/27/2022    Intertrigo 05/27/2022    Weakness of both lower extremities 06/24/2022    Accelerated hypertension 09/01/2022    Acute cystitis without hematuria 09/06/2022    Left lower lobe pneumonia 11/04/2022    Cellulitis and abscess of left lower extremity 05/15/2023    Benign hypertension with CKD (chronic kidney disease) stage IV 06/02/2023    Peripheral edema 07/30/2023    Primary malignant neuroendocrine tumor of pancreas 08/24/2023    Encounter for long-term (current) use of high-risk medication 08/28/2023    Diabetic foot ulcer 12/24/2023    Stage 3a chronic kidney disease 01/28/2024    Pressure injury of buttock, stage 2 01/28/2024    HTN (hypertension) 04/21/2024    Anemia due to chronic kidney disease 05/17/2024    Bilateral lower extremity edema 08/24/2024    Cellulitis of right lower extremity 08/24/2024    CKD (chronic kidney disease) stage 4, GFR 15-29 ml/min 08/24/2024    Acute CHF 08/24/2024    Bilateral cellulitis of lower leg 08/25/2024    Acute UTI 10/10/2024    UTI (urinary tract infection) 10/10/2024    Acute UTI (urinary tract infection) 10/11/2024    Metabolic acidosis 10/11/2024    Cobalamin deficiency 11/30/2022    Dependent edema 11/30/2022    Gastroesophageal reflux disease 11/30/2022    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    Pancreatic neoplasm 08/17/2023    Congestive heart failure 11/30/2022     Resolved Ambulatory Problems     Diagnosis Date Noted    Leukocytosis     Diabetic eye exam 02/12/2014    Altered mental status 12/15/2017    Stage 3a chronic kidney disease 03/09/2012    Metabolic encephalopathy 12/02/2021    COVID-19 virus detected 02/23/2022    UTI (urinary tract infection) 02/23/2022    Acute UTI (urinary tract infection) 03/27/2022    Burning pain 05/27/2022    Acute metabolic encephalopathy 06/22/2022    Hypoglycemia 06/23/2022    Acute metabolic encephalopathy 06/24/2022    Altered mental status, unspecified altered mental status type  11/04/2022    Diarrhea 11/04/2022    Acute pain of left foot 05/13/2023    Bilateral leg pain 07/21/2023    Acute renal insufficiency 01/09/2024    COVID-19 01/10/2024    Cytokine release syndrome, grade 1 01/15/2024    C. difficile diarrhea 01/15/2024    Sepsis 01/27/2024    Metabolic encephalopathy 01/28/2024    Uncontrolled diabetes mellitus with hyperglycemia 04/20/2024    Pseudohyponatremia 04/21/2024     Past Medical History:   Diagnosis Date    Anxiety     Bleeding disorder     Depression     Diabetes     Diabetes mellitus, type 2     Disc degeneration, lumbar     Headache, tension-type     Hypothyroidism     Pancreatic mass     Peripheral neuropathy     Rotator cuff tear, left     Shoulder pain          PAST SURGICAL HISTORY  Past Surgical History:   Procedure Laterality Date    APPENDECTOMY      BILATERAL BREAST REDUCTION Bilateral 08/2015    CATARACT EXTRACTION  03/2015    CHOLECYSTECTOMY WITH INTRAOPERATIVE CHOLANGIOGRAM N/A 3/27/2022    Procedure: CHOLECYSTECTOMY LAPAROSCOPIC INTRAOPERATIVE CHOLANGIOGRAM;  Surgeon: Aiyana Hill MD;  Location: [facility] OR;  Service: General;  Laterality: N/A;    COLONOSCOPY  06/05/2015    WNL    ERCP N/A 2/25/2022    Procedure: ENDOSCOPIC RETROGRADE CHOLANGIOPANCREATOGRAPHY with sphincterotomy and balloon sweep;  Surgeon: Chilo Wilhelm MD;  Location: [facility] ENDOSCOPY;  Service: Gastroenterology;  Laterality: N/A;  PRE/POST - CBD stones    ERCP N/A 3/28/2022    Procedure: ENDOSCOPIC RETROGRADE CHOLANGIOPANCREATOGRAPHY WITH SPHINCTEROTOMY AND BALLOON SWEEP;  Surgeon: Chilo Wilhelm MD;  Location: [facility] ENDOSCOPY;  Service: Gastroenterology;  Laterality: N/A;  PRE: COMMON DUCT STONE  POST: COMMON DUCT STONE    KYPHOPLASTY      REDUCTION MAMMAPLASTY      TONSILLECTOMY           FAMILY HISTORY  Family History   Problem Relation Age of Onset    Bone cancer Mother     No Known Problems Father     Heart disease Daughter          SOCIAL HISTORY  Social History      Socioeconomic History    Marital status:     Number of children: 3   Tobacco Use    Smoking status: Former     Current packs/day: 0.00     Average packs/day: 1 pack/day for 40.0 years (40.0 ttl pk-yrs)     Types: Cigarettes     Start date:      Quit date:      Years since quittin.8    Smokeless tobacco: Never   Vaping Use    Vaping status: Never Used   Substance and Sexual Activity    Alcohol use: No    Drug use: No    Sexual activity: Defer         ALLERGIES  Sulfa antibiotics        REVIEW OF SYSTEMS  Review of Systems     Included in HPI  All systems reviewed and negative except for those discussed in HPI.       PHYSICAL EXAM  ED Triage Vitals [24 1633]   Temp Heart Rate Resp BP SpO2   97.9 °F (36.6 °C) 70 18 164/78 94 %      Temp src Heart Rate Source Patient Position BP Location FiO2 (%)   Tympanic Monitor -- -- --       Physical Exam      GENERAL: no acute distress  HENT: nares patent  EYES: no scleral icterus  CV: regular rhythm, normal rate  RESPIRATORY: normal effort, clear to auscultation bilaterally  ABDOMEN: soft, nontender  MUSCULOSKELETAL: no deformity  NEURO: alert, moves all extremities, follows commands  PSYCH:  calm, cooperative  SKIN: warm, dry    Vital signs and nursing notes reviewed.          LAB RESULTS  Recent Results (from the past 24 hours)   Lavender Top    Collection Time: 24  5:41 PM   Result Value Ref Range    Extra Tube hold for add-on    CBC Auto Differential    Collection Time: 24  5:41 PM    Specimen: Blood   Result Value Ref Range    WBC 12.33 (H) 3.40 - 10.80 10*3/mm3    RBC 3.10 (L) 3.77 - 5.28 10*6/mm3    Hemoglobin 8.4 (L) 12.0 - 15.9 g/dL    Hematocrit 27.6 (L) 34.0 - 46.6 %    MCV 89.0 79.0 - 97.0 fL    MCH 27.1 26.6 - 33.0 pg    MCHC 30.4 (L) 31.5 - 35.7 g/dL    RDW 15.4 12.3 - 15.4 %    RDW-SD 50.1 37.0 - 54.0 fl    MPV 9.9 6.0 - 12.0 fL    Platelets 300 140 - 450 10*3/mm3    Neutrophil % 82.3 (H) 42.7 - 76.0 %    Lymphocyte % 5.7  (L) 19.6 - 45.3 %    Monocyte % 8.2 5.0 - 12.0 %    Eosinophil % 2.6 0.3 - 6.2 %    Basophil % 0.6 0.0 - 1.5 %    Immature Grans % 0.6 (H) 0.0 - 0.5 %    Neutrophils, Absolute 10.15 (H) 1.70 - 7.00 10*3/mm3    Lymphocytes, Absolute 0.70 0.70 - 3.10 10*3/mm3    Monocytes, Absolute 1.01 (H) 0.10 - 0.90 10*3/mm3    Eosinophils, Absolute 0.32 0.00 - 0.40 10*3/mm3    Basophils, Absolute 0.08 0.00 - 0.20 10*3/mm3    Immature Grans, Absolute 0.07 (H) 0.00 - 0.05 10*3/mm3    nRBC 0.0 0.0 - 0.2 /100 WBC   ECG 12 Lead ED Triage Standing Order; Weak / Dizzy / AMS    Collection Time: 11/05/24  5:50 PM   Result Value Ref Range    QT Interval 487 ms    QTC Interval 519 ms   Comprehensive Metabolic Panel    Collection Time: 11/05/24  6:13 PM    Specimen: Blood   Result Value Ref Range    Glucose 154 (H) 65 - 99 mg/dL    BUN 35 (H) 8 - 23 mg/dL    Creatinine 2.11 (H) 0.57 - 1.00 mg/dL    Sodium 141 136 - 145 mmol/L    Potassium 4.4 3.5 - 5.2 mmol/L    Chloride 111 (H) 98 - 107 mmol/L    CO2 18.4 (L) 22.0 - 29.0 mmol/L    Calcium 8.5 (L) 8.6 - 10.5 mg/dL    Total Protein 7.4 6.0 - 8.5 g/dL    Albumin 3.4 (L) 3.5 - 5.2 g/dL    ALT (SGPT) 5 1 - 33 U/L    AST (SGOT) 8 1 - 32 U/L    Alkaline Phosphatase 111 39 - 117 U/L    Total Bilirubin 0.4 0.0 - 1.2 mg/dL    Globulin 4.0 gm/dL    A/G Ratio 0.9 g/dL    BUN/Creatinine Ratio 16.6 7.0 - 25.0    Anion Gap 11.6 5.0 - 15.0 mmol/L    eGFR 22.7 (L) >60.0 mL/min/1.73   Single High Sensitivity Troponin T    Collection Time: 11/05/24  6:13 PM    Specimen: Blood   Result Value Ref Range    HS Troponin T 54 (C) <14 ng/L   Magnesium    Collection Time: 11/05/24  6:13 PM    Specimen: Blood   Result Value Ref Range    Magnesium 1.9 1.6 - 2.4 mg/dL   Green Top (Gel)    Collection Time: 11/05/24  6:13 PM   Result Value Ref Range    Extra Tube Hold for add-ons.    Gold Top - SST    Collection Time: 11/05/24  6:13 PM   Result Value Ref Range    Extra Tube Hold for add-ons.    Light Blue Top    Collection  Time: 11/05/24  6:13 PM   Result Value Ref Range    Extra Tube Hold for add-ons.    BNP    Collection Time: 11/05/24  6:13 PM    Specimen: Blood   Result Value Ref Range    proBNP 6,353.0 (H) 0.0 - 1,800.0 pg/mL       Ordered the above labs and reviewed the results.        RADIOLOGY  XR Chest 1 View    Result Date: 11/5/2024  XR CHEST 1 VW-  INDICATION: Bilateral leg swelling and pain  COMPARISON: Chest radiographs dating back to 6/18/2016      Low lung volumes. Lingular linear atelectasis/scarring. No new focal consolidation. No pleural effusion or pneumothorax. Normal size cardiomediastinal silhouette. Central pulmonary vasculature is accentuated by low lung volumes but remains distinct/nondilated. Few healed right-sided rib fractures.  This report was finalized on 11/5/2024 5:28 PM by Dr. Lewis Viramontes M.D on Workstation: [id number]       Ordered the above noted radiological studies. Reviewed by me in PACS.        MEDICATIONS GIVEN IN ER  Medications   sodium chloride 0.9 % flush 10 mL (has no administration in time range)   acetaminophen (TYLENOL) tablet 650 mg (has no administration in time range)     Or   acetaminophen (TYLENOL) 160 MG/5ML oral solution 650 mg (has no administration in time range)     Or   acetaminophen (TYLENOL) suppository 650 mg (has no administration in time range)   ondansetron ODT (ZOFRAN-ODT) disintegrating tablet 4 mg (has no administration in time range)     Or   ondansetron (ZOFRAN) injection 4 mg (has no administration in time range)   melatonin tablet 2.5 mg (has no administration in time range)   sennosides-docusate (PERICOLACE) 8.6-50 MG per tablet 2 tablet (has no administration in time range)     And   polyethylene glycol (MIRALAX) packet 17 g (has no administration in time range)     And   bisacodyl (DULCOLAX) EC tablet 5 mg (has no administration in time range)     And   bisacodyl (DULCOLAX) suppository 10 mg (has no administration in time range)   furosemide (LASIX)  injection 80 mg (has no administration in time range)         ORDERS PLACED DURING THIS VISIT:  Orders Placed This Encounter   Procedures    XR Chest 1 View    [place] Draw    Comprehensive Metabolic Panel    Single High Sensitivity Troponin T    Magnesium    Urinalysis With Microscopic If Indicated (No Culture) - Urine, Clean Catch    CBC Auto Differential    BNP    Basic Metabolic Panel    CBC (No Diff)    High Sensitivity Troponin T 2Hr    Diet: Cardiac; Healthy Heart (2-3 Na+); Fluid Consistency: Thin (IDDSI 0)    Undress & Gown    Continuous Pulse Oximetry    Vital Signs    Orthostatic Blood Pressure    Vital Signs    Up with assistance    Daily Weights    Strict Intake & Output    Oral Care    Place Sequential Compression Device    Maintain Sequential Compression Device    Code Status and Medical Interventions: CPR (Attempt to Resuscitate); Full    Oxygen Therapy- Nasal Cannula; Titrate 1-6 LPM Per SpO2; 90 - 95%    POC Glucose Once    ECG 12 Lead ED Triage Standing Order; Weak / Dizzy / AMS    Insert Peripheral IV    Inpatient Admission    Inpatient Admission    Fall Precautions    CBC & Differential    Green Top (Gel)    Lavender Top    Gold Top - SST    Light Blue Top         OUTPATIENT MEDICATION MANAGEMENT:  Current Facility-Administered Medications Ordered in Epic   Medication Dose Route Frequency Provider Last Rate Last Admin    acetaminophen (TYLENOL) tablet 650 mg  650 mg Oral Q4H PRN Kim Barnard MD        Or    acetaminophen (TYLENOL) 160 MG/5ML oral solution 650 mg  650 mg Oral Q4H PRN Kim Barnard MD        Or    acetaminophen (TYLENOL) suppository 650 mg  650 mg Rectal Q4H PRN Kim Barnard MD        sennosides-docusate (PERICOLACE) 8.6-50 MG per tablet 2 tablet  2 tablet Oral BID PRN Kim Barnard MD        And    polyethylene glycol (MIRALAX) packet 17 g  17 g Oral Daily PRN Kim Barnard MD        And    bisacodyl (DULCOLAX) EC tablet 5 mg  5 mg  Oral Daily PRN Stingena, Kim Gutiérrez MD        And    bisacodyl (DULCOLAX) suppository 10 mg  10 mg Rectal Daily PRN Kim Barnard MD        furosemide (LASIX) injection 80 mg  80 mg Intravenous Once Bertram Orona II, MD        melatonin tablet 2.5 mg  2.5 mg Oral Nightly PRN Kim Barnard MD        ondansetron ODT (ZOFRAN-ODT) disintegrating tablet 4 mg  4 mg Oral Q6H PRN Kim Barnard MD        Or    ondansetron (ZOFRAN) injection 4 mg  4 mg Intravenous Q6H PRN Kim Barnard MD        sodium chloride 0.9 % flush 10 mL  10 mL Intravenous PRN Bertram Orona II, MD         Current Outpatient Medications Ordered in Epic   Medication Sig Dispense Refill    acetaminophen (TYLENOL) 325 MG tablet Take 2 tablets by mouth Every 6 (Six) Hours As Needed for Mild Pain or Moderate Pain.      ammonium lactate (AMLACTIN) 12 % cream Apply 1 Application topically to the appropriate area as directed 2 (Two) Times a Day.      atorvastatin (LIPITOR) 80 MG tablet Take 1 tablet by mouth Every Night. Indications: High Amount of Fats in the Blood 30 tablet 2    bisoprolol (ZEBeta) 5 MG tablet Take 1 tablet by mouth Daily. 90 tablet 3    DULoxetine (CYMBALTA) 30 MG capsule Take 1 capsule by mouth Daily. 90 capsule 3    ferrous sulfate 325 (65 FE) MG tablet Take 1 tablet by mouth Every Other Day.      fluticasone (FLONASE) 50 MCG/ACT nasal spray Administer 2 sprays into the nostril(s) as directed by provider Daily. 16 g 11    hydrALAZINE (APRESOLINE) 50 MG tablet Take 1 tablet by mouth Every 8 (Eight) Hours. 270 tablet 1    hydrocortisone-bacitracin-zinc oxide-nystatin (MAGIC BARRIER) Apply 1 Application topically to the appropriate area as directed 2 (Two) Times a Day.      Insulin Glargine, 2 Unit Dial, (TOUJEO) 300 UNIT/ML solution pen-injector injection Inject 90 Units under the skin into the appropriate area as directed Daily. 30 mL 0    Insulin Lispro, 1 Unit Dial, (HumaLOG KwikPen)  100 UNIT/ML solution pen-injector Inject 15-20 Units under the skin into the appropriate area as directed 3 (Three) Times a Day Before Meals. 60 mL 0    Insulin Pen Needle (BD Pen Needle Naila 2nd Gen) 32G X 4 MM misc Use 1 pen needle 4 (Four) Times a Day. 400 each 2    lansoprazole (PREVACID) 30 MG capsule TAKE 1 CAPSULE DAILY 30 capsule 11    levothyroxine (SYNTHROID, LEVOTHROID) 137 MCG tablet Take 1 tablet by mouth Every Morning. 90 tablet 1    octreotide (sandoSTATIN) 30 MG injection Inject 30 mg into the appropriate muscle as directed by prescriber Every 28 (Twenty-Eight) Days. Next dose 9/16/2024      pregabalin (LYRICA) 75 MG capsule Take 1 capsule by mouth 2 (Two) Times a Day for 3 days. 6 capsule 0    terazosin (HYTRIN) 1 MG capsule Take 1 capsule by mouth Every Night. 90 capsule 1       PROCEDURES  Procedures          MEDICAL DECISION MAKING, PROGRESS, and CONSULTS    Discussion below represents my analysis of pertinent findings related to patient's condition, differential diagnosis, treatment plan and final disposition.            Differential diagnosis:    Differential diagnosis includes but not limited to CHF, acute coronary syndrome, pulmonary embolism, pneumothorax, pneumonia, asthma/COPD, pulmonary hypertension, deconditioning, anemia, other hematologic etiologies such as CO poisoning, anxiety.                      Independent interpretation of labs, radiology studies, and discussions with consultants:  ED Course as of 11/05/24 1907 Tue Nov 05, 2024   1756 EKG independently interpreted by myself.  Time 5:50 PM.  Sinus rhythm.  Heart rate 68.  Left atrial normality.  Right bundle branch block.  No acute ST abnormality. [TD]   1759 WBC(!): 12.33 [TD]   1759 Hemoglobin(!): 8.4 [TD]   1809 On medical chart review, reviewed the most recent completed progress note prior to discharge from 10/19/2024.  Patient appears to be treated for CKD stage IV with hyponatremia and hyperkalemia and metabolic acidosis.   Diuresis was managed by Dr. Lanier with nephrology.  Patient treated with acute kidney injury versus progression of chronic kidney disease.  It is held to be likely progression of underlying kidney disease secondary to poorly controlled diabetes. [TD]   1905 proBNP(!): 6,353.0 [TD]   1905 HS Troponin T(!!): 54 [TD]      ED Course User Index  [TD] Bertram Orona II, MD         I discussed the case with Dr. Barnard, hospitalist.  She will admit.        DIAGNOSIS  Final diagnoses:   Acute on chronic congestive heart failure, unspecified heart failure type   Bilateral leg edema         DISPOSITION  Admit      Latest Documented Vital Signs:  As of 19:07 EST  BP- (!) 196/79 HR- 67 Temp- 97.9 °F (36.6 °C) (Tympanic) O2 sat- 93%      --    Please note that portions of this were completed with a voice recognition program.       Note Disclaimer: At [facility], we believe that sharing information builds trust and better relationships. You are receiving this note because you are receiving care at [facility] or recently visited. It is possible you will see health information before a provider has talked with you about it. This kind of information can be easy to misunderstand. To help you fully understand what it means for your health, we urge you to discuss this note with your provider.         Bertram Orona II, MD  11/05/24 1907

## 2024-11-05 NOTE — TELEPHONE ENCOUNTER
I CALLED AND SPOKE WITH [employer]  CARRY  - SHE WAS ADVISED TO GO TO THE ER , HOWEVER PATIENT REFUSED   DR SILVA ADVISED   Just called! No answer, I did leave a message. I was with patient's and did not see this message until now. If this patient is in fluid overload and having shortness of breath with edema she needs to go directly to the hospital. In the future this is considered an emergency.

## 2024-11-06 LAB
ABO GROUP BLD: NORMAL
ANION GAP SERPL CALCULATED.3IONS-SCNC: 13.8 MMOL/L (ref 5–15)
BLD GP AB SCN SERPL QL: NEGATIVE
BUN SERPL-MCNC: 36 MG/DL (ref 8–23)
BUN/CREAT SERPL: 17.4 (ref 7–25)
CALCIUM SPEC-SCNC: 8.4 MG/DL (ref 8.6–10.5)
CHLORIDE SERPL-SCNC: 111 MMOL/L (ref 98–107)
CHLORIDE UR-SCNC: 84 MMOL/L
CO2 SERPL-SCNC: 17.2 MMOL/L (ref 22–29)
CREAT SERPL-MCNC: 2.07 MG/DL (ref 0.57–1)
CREAT UR-MCNC: 82.6 MG/DL
D DIMER PPP FEU-MCNC: 3.07 MCGFEU/ML (ref 0–0.84)
DEPRECATED RDW RBC AUTO: 47.8 FL (ref 37–54)
EGFRCR SERPLBLD CKD-EPI 2021: 23.3 ML/MIN/1.73
ERYTHROCYTE [DISTWIDTH] IN BLOOD BY AUTOMATED COUNT: 15.3 % (ref 12.3–15.4)
FERRITIN SERPL-MCNC: 136 NG/ML (ref 13–150)
FOLATE SERPL-MCNC: 12 NG/ML (ref 4.78–24.2)
GLUCOSE BLDC GLUCOMTR-MCNC: 105 MG/DL (ref 70–130)
GLUCOSE BLDC GLUCOMTR-MCNC: 171 MG/DL (ref 70–130)
GLUCOSE BLDC GLUCOMTR-MCNC: 239 MG/DL (ref 70–130)
GLUCOSE BLDC GLUCOMTR-MCNC: 289 MG/DL (ref 70–130)
GLUCOSE SERPL-MCNC: 201 MG/DL (ref 65–99)
HBA1C MFR BLD: 9.1 % (ref 4.8–5.6)
HCT VFR BLD AUTO: 23.1 % (ref 34–46.6)
HGB BLD-MCNC: 7.3 G/DL (ref 12–15.9)
IRON 24H UR-MRATE: 18 MCG/DL (ref 37–145)
IRON SATN MFR SERPL: 7 % (ref 20–50)
LDH SERPL-CCNC: 178 U/L (ref 135–214)
MAGNESIUM SERPL-MCNC: 1.7 MG/DL (ref 1.6–2.4)
MCH RBC QN AUTO: 27.1 PG (ref 26.6–33)
MCHC RBC AUTO-ENTMCNC: 31.6 G/DL (ref 31.5–35.7)
MCV RBC AUTO: 85.9 FL (ref 79–97)
PLATELET # BLD AUTO: 307 10*3/MM3 (ref 140–450)
PMV BLD AUTO: 9.9 FL (ref 6–12)
POTASSIUM SERPL-SCNC: 3.8 MMOL/L (ref 3.5–5.2)
PROT ?TM UR-MCNC: 523.7 MG/DL
PROT/CREAT UR: 6340.2 MG/G CREA (ref 0–200)
RBC # BLD AUTO: 2.69 10*6/MM3 (ref 3.77–5.28)
RETICS # AUTO: 0.06 10*6/MM3 (ref 0.02–0.13)
RETICS/RBC NFR AUTO: 2.29 % (ref 0.7–1.9)
RH BLD: POSITIVE
SODIUM SERPL-SCNC: 142 MMOL/L (ref 136–145)
SODIUM UR-SCNC: 91 MMOL/L
T&S EXPIRATION DATE: NORMAL
TIBC SERPL-MCNC: 273 MCG/DL (ref 298–536)
TRANSFERRIN SERPL-MCNC: 183 MG/DL (ref 200–360)
TSH SERPL DL<=0.05 MIU/L-ACNC: 4.64 UIU/ML (ref 0.27–4.2)
URATE SERPL-MCNC: 8.8 MG/DL (ref 2.4–5.7)
VIT B12 BLD-MCNC: 346 PG/ML (ref 211–946)
WBC NRBC COR # BLD AUTO: 9.84 10*3/MM3 (ref 3.4–10.8)

## 2024-11-06 PROCEDURE — 86901 BLOOD TYPING SEROLOGIC RH(D): CPT | Performed by: INTERNAL MEDICINE

## 2024-11-06 PROCEDURE — 83540 ASSAY OF IRON: CPT | Performed by: INTERNAL MEDICINE

## 2024-11-06 PROCEDURE — 86900 BLOOD TYPING SEROLOGIC ABO: CPT

## 2024-11-06 PROCEDURE — 83036 HEMOGLOBIN GLYCOSYLATED A1C: CPT | Performed by: INTERNAL MEDICINE

## 2024-11-06 PROCEDURE — 82728 ASSAY OF FERRITIN: CPT | Performed by: INTERNAL MEDICINE

## 2024-11-06 PROCEDURE — 36415 COLL VENOUS BLD VENIPUNCTURE: CPT | Performed by: INTERNAL MEDICINE

## 2024-11-06 PROCEDURE — 25010000002 FUROSEMIDE PER 20 MG: Performed by: NURSE PRACTITIONER

## 2024-11-06 PROCEDURE — 85379 FIBRIN DEGRADATION QUANT: CPT | Performed by: INTERNAL MEDICINE

## 2024-11-06 PROCEDURE — 86923 COMPATIBILITY TEST ELECTRIC: CPT

## 2024-11-06 PROCEDURE — 84466 ASSAY OF TRANSFERRIN: CPT | Performed by: INTERNAL MEDICINE

## 2024-11-06 PROCEDURE — 83615 LACTATE (LD) (LDH) ENZYME: CPT | Performed by: INTERNAL MEDICINE

## 2024-11-06 PROCEDURE — 85045 AUTOMATED RETICULOCYTE COUNT: CPT | Performed by: INTERNAL MEDICINE

## 2024-11-06 PROCEDURE — 97166 OT EVAL MOD COMPLEX 45 MIN: CPT

## 2024-11-06 PROCEDURE — 82746 ASSAY OF FOLIC ACID SERUM: CPT | Performed by: INTERNAL MEDICINE

## 2024-11-06 PROCEDURE — 97530 THERAPEUTIC ACTIVITIES: CPT

## 2024-11-06 PROCEDURE — 86900 BLOOD TYPING SEROLOGIC ABO: CPT | Performed by: INTERNAL MEDICINE

## 2024-11-06 PROCEDURE — P9016 RBC LEUKOCYTES REDUCED: HCPCS

## 2024-11-06 PROCEDURE — 97110 THERAPEUTIC EXERCISES: CPT

## 2024-11-06 PROCEDURE — 84443 ASSAY THYROID STIM HORMONE: CPT | Performed by: INTERNAL MEDICINE

## 2024-11-06 PROCEDURE — 82948 REAGENT STRIP/BLOOD GLUCOSE: CPT

## 2024-11-06 PROCEDURE — 85027 COMPLETE CBC AUTOMATED: CPT | Performed by: INTERNAL MEDICINE

## 2024-11-06 PROCEDURE — 82607 VITAMIN B-12: CPT | Performed by: INTERNAL MEDICINE

## 2024-11-06 PROCEDURE — 25010000002 BUMETANIDE PER 0.5 MG: Performed by: INTERNAL MEDICINE

## 2024-11-06 PROCEDURE — 86901 BLOOD TYPING SEROLOGIC RH(D): CPT

## 2024-11-06 PROCEDURE — 36430 TRANSFUSION BLD/BLD COMPNT: CPT

## 2024-11-06 PROCEDURE — 80048 BASIC METABOLIC PNL TOTAL CA: CPT | Performed by: INTERNAL MEDICINE

## 2024-11-06 PROCEDURE — 84550 ASSAY OF BLOOD/URIC ACID: CPT

## 2024-11-06 PROCEDURE — 86850 RBC ANTIBODY SCREEN: CPT | Performed by: INTERNAL MEDICINE

## 2024-11-06 PROCEDURE — 97162 PT EVAL MOD COMPLEX 30 MIN: CPT

## 2024-11-06 PROCEDURE — 83735 ASSAY OF MAGNESIUM: CPT | Performed by: INTERNAL MEDICINE

## 2024-11-06 PROCEDURE — 63710000001 INSULIN GLARGINE PER 5 UNITS: Performed by: INTERNAL MEDICINE

## 2024-11-06 PROCEDURE — 63710000001 INSULIN LISPRO (HUMAN) PER 5 UNITS: Performed by: INTERNAL MEDICINE

## 2024-11-06 RX ORDER — BUMETANIDE 0.25 MG/ML
4 INJECTION, SOLUTION INTRAMUSCULAR; INTRAVENOUS EVERY 8 HOURS
Status: COMPLETED | OUTPATIENT
Start: 2024-11-06 | End: 2024-11-06

## 2024-11-06 RX ORDER — BUMETANIDE 0.25 MG/ML
4 INJECTION, SOLUTION INTRAMUSCULAR; INTRAVENOUS EVERY 8 HOURS
Status: DISCONTINUED | OUTPATIENT
Start: 2024-11-06 | End: 2024-11-06

## 2024-11-06 RX ORDER — HYDROCODONE BITARTRATE AND ACETAMINOPHEN 5; 325 MG/1; MG/1
1 TABLET ORAL EVERY 6 HOURS PRN
Status: DISCONTINUED | OUTPATIENT
Start: 2024-11-06 | End: 2024-11-11 | Stop reason: HOSPADM

## 2024-11-06 RX ADMIN — PREGABALIN 75 MG: 75 CAPSULE ORAL at 21:22

## 2024-11-06 RX ADMIN — HYDROCODONE BITARTRATE AND ACETAMINOPHEN 1 TABLET: 5; 325 TABLET ORAL at 17:53

## 2024-11-06 RX ADMIN — PANTOPRAZOLE SODIUM 40 MG: 40 TABLET, DELAYED RELEASE ORAL at 06:13

## 2024-11-06 RX ADMIN — HYDRALAZINE HYDROCHLORIDE 50 MG: 50 TABLET ORAL at 21:22

## 2024-11-06 RX ADMIN — Medication 2.5 MG: at 21:22

## 2024-11-06 RX ADMIN — FUROSEMIDE 80 MG: 10 INJECTION, SOLUTION INTRAMUSCULAR; INTRAVENOUS at 09:13

## 2024-11-06 RX ADMIN — BUMETANIDE 4 MG: 0.25 INJECTION INTRAMUSCULAR; INTRAVENOUS at 17:52

## 2024-11-06 RX ADMIN — LEVOTHYROXINE SODIUM 137 MCG: 137 TABLET ORAL at 06:13

## 2024-11-06 RX ADMIN — ACETAMINOPHEN 325MG 650 MG: 325 TABLET ORAL at 09:20

## 2024-11-06 RX ADMIN — INSULIN LISPRO 3 UNITS: 100 INJECTION, SOLUTION INTRAVENOUS; SUBCUTANEOUS at 11:52

## 2024-11-06 RX ADMIN — DULOXETINE HYDROCHLORIDE 30 MG: 30 CAPSULE, DELAYED RELEASE ORAL at 09:13

## 2024-11-06 RX ADMIN — BUMETANIDE 4 MG: 0.25 INJECTION INTRAMUSCULAR; INTRAVENOUS at 23:18

## 2024-11-06 RX ADMIN — BISOPROLOL FUMARATE 5 MG: 5 TABLET, FILM COATED ORAL at 09:13

## 2024-11-06 RX ADMIN — HYDROCODONE BITARTRATE AND ACETAMINOPHEN 1 TABLET: 5; 325 TABLET ORAL at 11:50

## 2024-11-06 RX ADMIN — INSULIN LISPRO 4 UNITS: 100 INJECTION, SOLUTION INTRAVENOUS; SUBCUTANEOUS at 21:23

## 2024-11-06 RX ADMIN — PREGABALIN 75 MG: 75 CAPSULE ORAL at 09:18

## 2024-11-06 RX ADMIN — ATORVASTATIN CALCIUM 80 MG: 80 TABLET, FILM COATED ORAL at 21:22

## 2024-11-06 RX ADMIN — INSULIN LISPRO 2 UNITS: 100 INJECTION, SOLUTION INTRAVENOUS; SUBCUTANEOUS at 09:13

## 2024-11-06 RX ADMIN — TERAZOSIN 1 MG: 1 CAPSULE ORAL at 21:22

## 2024-11-06 RX ADMIN — INSULIN GLARGINE 90 UNITS: 100 INJECTION, SOLUTION SUBCUTANEOUS at 21:23

## 2024-11-06 RX ADMIN — INSULIN LISPRO 15 UNITS: 100 INJECTION, SOLUTION INTRAVENOUS; SUBCUTANEOUS at 11:50

## 2024-11-06 NOTE — PLAN OF CARE
Goal Outcome Evaluation:      Pt admitted night of 11/5/24 from ED for CHF exacerbation. Pt AOx4, 1L NC, BP elevated but stable. No c/o SOA since admission.Takes meds whole. WOCN consulted for BLE and coccyx- pt stated during previous admission there were specific instructions for BLE. Pt stated she is wheelchair bound at baseline. Nephrology consult called. Purewick in place. Bed alarm on. Call light within reach. Plan of care ongoing.

## 2024-11-06 NOTE — NURSING NOTE
11/06/24 0801   Wound 10/10/24 1400 Bilateral lateral coccyx   Placement Date/Time: 10/10/24 1400   Side: Bilateral  Orientation: lateral  Location: coccyx   Dressing Appearance dry;intact   Base blanchable;pink   Periwound intact   Edges irregular   Drainage Amount none   Dressing Care silicone border foam     WOCN consult: Coccyx pink blanchable. Heels slow to lizet. Please apply heel boots and accumax pump to mattress. Legs edematous, pink and painful to touch. Cleansed BLE's applied vaseline gauze to dry areas, wrap roll gauze and 2 ace wraps toes to knee bilateral legs.  Patient states feels much better. Needs assistance with repositioning. Change wraps 3 times per week. Skin prevention protocols placed in epic.

## 2024-11-06 NOTE — ED NOTES
"Nursing report ED to floor  Halie Guidry  84 y.o.  female    HPI :  HPI  Stated Reason for Visit: leg swelling  History Obtained From: patient, EMS    Chief Complaint  Chief Complaint   Patient presents with    Leg Swelling       Admitting doctor:   Kim Barnard MD    Admitting diagnosis:   The primary encounter diagnosis was Acute on chronic congestive heart failure, unspecified heart failure type. A diagnosis of Bilateral leg edema was also pertinent to this visit.    Code status:   Current Code Status       Date Active Code Status Order ID Comments User Context       11/5/2024 1828 CPR (Attempt to Resuscitate) [id number]  Kim Barnard MD ED        Question Answer    Code Status (Patient has no pulse and is not breathing) CPR (Attempt to Resuscitate)    Medical Interventions (Patient has pulse or is breathing) Full                    Allergies:   Sulfa antibiotics    Isolation:   Contact    Intake and Output  No intake or output data in the 24 hours ending 11/05/24 1918    Weight:       11/05/24 1758   Weight: 98.2 kg (216 lb 7.9 oz)       Most recent vitals:   Vitals:    11/05/24 1754 11/05/24 1755 11/05/24 1756 11/05/24 1758   BP:       Pulse: 66 68 67    Resp:       Temp:       TempSrc:       SpO2: 92% 93% 93%    Weight:    98.2 kg (216 lb 7.9 oz)   Height:    165.1 cm (65\")       Active LDAs/IV Access:   Lines, Drains & Airways       Active LDAs       Name Placement date Placement time Site Days    Peripheral IV 11/05/24 1816 Anterior;Left Forearm 11/05/24 1816  Forearm  less than 1    External Urinary Catheter 10/10/24  --  --  26                    Labs (abnormal labs have a star):   Labs Reviewed   COMPREHENSIVE METABOLIC PANEL - Abnormal; Notable for the following components:       Result Value    Glucose 154 (*)     BUN 35 (*)     Creatinine 2.11 (*)     Chloride 111 (*)     CO2 18.4 (*)     Calcium 8.5 (*)     Albumin 3.4 (*)     eGFR 22.7 (*)     All other components within " normal limits    Narrative:     GFR Normal >60  Chronic Kidney Disease <60  Kidney Failure <15    The GFR formula is only valid for adults with stable renal function between ages 18 and 70.   SINGLE HS TROPONIN T - Abnormal; Notable for the following components:    HS Troponin T 54 (*)     All other components within normal limits    Narrative:     High Sensitive Troponin T Reference Range:  <14.0 ng/L- Negative Female for AMI  <22.0 ng/L- Negative Male for AMI  >=14 - Abnormal Female indicating possible myocardial injury.  >=22 - Abnormal Male indicating possible myocardial injury.   Clinicians would have to utilize clinical acumen, EKG, Troponin, and serial changes to determine if it is an Acute Myocardial Infarction or myocardial injury due to an underlying chronic condition.        CBC WITH AUTO DIFFERENTIAL - Abnormal; Notable for the following components:    WBC 12.33 (*)     RBC 3.10 (*)     Hemoglobin 8.4 (*)     Hematocrit 27.6 (*)     MCHC 30.4 (*)     Neutrophil % 82.3 (*)     Lymphocyte % 5.7 (*)     Immature Grans % 0.6 (*)     Neutrophils, Absolute 10.15 (*)     Monocytes, Absolute 1.01 (*)     Immature Grans, Absolute 0.07 (*)     All other components within normal limits   BNP (IN-HOUSE) - Abnormal; Notable for the following components:    proBNP 6,353.0 (*)     All other components within normal limits    Narrative:     This assay is used as an aid in the diagnosis of individuals suspected of having heart failure. It can be used as an aid in the diagnosis of acute decompensated heart failure (ADHF) in patients presenting with signs and symptoms of ADHF to the emergency department (ED). In addition, NT-proBNP of <300 pg/mL indicates ADHF is not likely.    Age Range Result Interpretation  NT-proBNP Concentration (pg/mL:      <50             Positive            >450                   Gray                 300-450                    Negative             <300    50-75           Positive            >900                   Johnson                300-900                  Negative            <300      >75             Positive            >1800                  Gray                300-1800                  Negative            <300   MAGNESIUM - Normal   RAINBOW DRAW    Narrative:     The following orders were created for panel order [place] Draw.  Procedure                               Abnormality         Status                     ---------                               -----------         ------                     Green Top (Gel)[[id number]]                                  Final result               Lavender Top[[id number]]                                     Final result               Gold Top - SST[[id number]]                                   Final result               Light Blue Top[[id number]]                                   Final result                 Please view results for these tests on the individual orders.   URINALYSIS W/ MICROSCOPIC IF INDICATED (NO CULTURE)   HIGH SENSITIVITIY TROPONIN T 2HR   POCT GLUCOSE FINGERSTICK   CBC AND DIFFERENTIAL    Narrative:     The following orders were created for panel order CBC & Differential.  Procedure                               Abnormality         Status                     ---------                               -----------         ------                     CBC Auto Differential[[id number]]        Abnormal            Final result                 Please view results for these tests on the individual orders.   GREEN TOP   LAVENDER TOP   GOLD TOP - SST   LIGHT BLUE TOP       EKG:   ECG 12 Lead ED Triage Standing Order; Weak / Dizzy / AMS   Final Result   HEART RATE=68  bpm   RR Wrmugkwx=927  ms   HI Interval=160  ms   P Horizontal Axis=-53  deg   P Front Axis=36  deg   QRSD Oycjpprk=282  ms   QT Qtgmcoth=104  ms   LEbI=331  ms   QRS Axis=40  deg   T Wave Axis=16  deg   - ABNORMAL ECG -   Sinus rhythm   Probable  left atrial enlargement   Right bundle branch block   When  compared with ECG of 21-Jul-2023 18:34:13,   No significant change   Electronically Signed By: Josse Fonseca ([facility]) 2024-11-05 18:48:19   Date and Time of Study:2024-11-05 17:50:11          Meds given in ED:   Medications   sodium chloride 0.9 % flush 10 mL (has no administration in time range)   acetaminophen (TYLENOL) tablet 650 mg (has no administration in time range)     Or   acetaminophen (TYLENOL) 160 MG/5ML oral solution 650 mg (has no administration in time range)     Or   acetaminophen (TYLENOL) suppository 650 mg (has no administration in time range)   ondansetron ODT (ZOFRAN-ODT) disintegrating tablet 4 mg (has no administration in time range)     Or   ondansetron (ZOFRAN) injection 4 mg (has no administration in time range)   melatonin tablet 2.5 mg (has no administration in time range)   sennosides-docusate (PERICOLACE) 8.6-50 MG per tablet 2 tablet (has no administration in time range)     And   polyethylene glycol (MIRALAX) packet 17 g (has no administration in time range)     And   bisacodyl (DULCOLAX) EC tablet 5 mg (has no administration in time range)     And   bisacodyl (DULCOLAX) suppository 10 mg (has no administration in time range)   furosemide (LASIX) injection 80 mg (has no administration in time range)       Imaging results:  XR Chest 1 View    Result Date: 11/5/2024  Low lung volumes. Lingular linear atelectasis/scarring. No new focal consolidation. No pleural effusion or pneumothorax. Normal size cardiomediastinal silhouette. Central pulmonary vasculature is accentuated by low lung volumes but remains distinct/nondilated. Few healed right-sided rib fractures.  This report was finalized on 11/5/2024 5:28 PM by Dr. Lewis Viramontes M.D on Workstation: [id number]       Ambulatory status:   -     Social issues:   Social History     Socioeconomic History    Marital status:     Number of children: 3   Tobacco Use    Smoking status: Former     Current packs/day: 0.00     Average  packs/day: 1 pack/day for 40.0 years (40.0 ttl pk-yrs)     Types: Cigarettes     Start date:      Quit date:      Years since quittin.8    Smokeless tobacco: Never   Vaping Use    Vaping status: Never Used   Substance and Sexual Activity    Alcohol use: No    Drug use: No    Sexual activity: Defer       Peripheral Neurovascular  Peripheral Neurovascular (Adult)  Peripheral Neurovascular WDL: WDL    Neuro Cognitive  Neuro Cognitive (Adult)  Cognitive/Neuro/Behavioral WDL: WDL    Learning  Learning Assessment  Learning Readiness and Ability: no barriers identified    Respiratory  Respiratory  Airway WDL: WDL  Respiratory WDL  Respiratory WDL: WDL    Abdominal Pain       Pain Assessments  Pain (Adult)  (0-10) Pain Rating: Rest: 2    NIH Stroke Scale       Carson Morse RN  24 19:18 EST

## 2024-11-06 NOTE — PROGRESS NOTES
Name: Halie Guidry ADMIT: 2024   : 1940  PCP: Napoleon Mead MD    MRN: [id number] LOS: 1 days   AGE/SEX: 84 y.o. female  ROOM: [facility]     Subjective   Subjective   Reports decreased shortness of breath.  No change in the lower extremity edema.  No chest pain.  No palpitation.  No PND orthopnea.  No fever or chills.  No cough.  Complaining of pain in both feet.    Review of Systems  GI.  No abdominal pain.  No nausea or vomiting.  No bowel movement since admission yesterday.  .  No dysuria or hematuria.     Objective   Objective   Vital Signs  Temp:  [97.3 °F (36.3 °C)-98.2 °F (36.8 °C)] 97.3 °F (36.3 °C)  Heart Rate:  [61-71] 61  Resp:  [18] 18  BP: (141-209)/(49-90) 145/55  SpO2:  [92 %-97 %] 97 %  on  Flow (L/min) (Oxygen Therapy):  [1] 1;   Device (Oxygen Therapy): nasal cannula    Intake/Output Summary (Last 24 hours) at 2024 1321  Last data filed at 2024 0059  Gross per 24 hour   Intake --   Output 500 ml   Net -500 ml     Body mass index is 36.28 kg/m².      24  1758 24  2207   Weight: 98.2 kg (216 lb 7.9 oz) 98.9 kg (218 lb 0.6 oz)     Physical Exam  General.  Elderly female.  Overweight.  Alert and oriented x 4.  In no apparent pain/distress/diaphoresis.  Appears chronically ill.  Normal mood and affect.  Eyes.  Pupils equal round and reactive.  Intact extraocular musculature.  Status post bilateral cataract surgery.  No pallor or jaundice.  Oral cavity.  Moist mucous membrane.  Neck.  Supple.  Positive JVD.  No lymphadenopathy or thyromegaly.  Cardiovascular.  Regular rate and rhythm with grade 2 systolic murmur.  Chest.  Poor bilateral air entry with bibasilar rhonchi and fine crackles.  Abdomen.  Soft lax.  No tenderness.  No organomegaly.  No guarding or rebound  Extremities.  Dressed both lower extremities with Ace bandage.  Apparently +1 to +2 bilateral lower extremity edema.  No clubbing or cyanosis.  CNS.  No acute focal neurological  "deficits.    Results Review:      Results from last 7 days   Lab Units 11/06/24  0324 11/05/24  1813   SODIUM mmol/L 142 141   POTASSIUM mmol/L 3.8 4.4   CHLORIDE mmol/L 111* 111*   CO2 mmol/L 17.2* 18.4*   BUN mg/dL 36* 35*   CREATININE mg/dL 2.07* 2.11*   GLUCOSE mg/dL 201* 154*   CALCIUM mg/dL 8.4* 8.5*   AST (SGOT) U/L  --  8   ALT (SGPT) U/L  --  5     Estimated Creatinine Clearance: 23.6 mL/min (A) (by C-G formula based on SCr of 2.07 mg/dL (H)).      Results from last 7 days   Lab Units 11/06/24  1053 11/06/24  0557 11/05/24  2318   GLUCOSE mg/dL 239* 171* 143*     Results from last 7 days   Lab Units 11/05/24  1951 11/05/24  1813   HSTROP T ng/L 53* 54*     Results from last 7 days   Lab Units 11/05/24  1813   PROBNP pg/mL 6,353.0*         Results from last 7 days   Lab Units 11/05/24  1813   MAGNESIUM mg/dL 1.9           Invalid input(s): \"LDLCALC\"  Results from last 7 days   Lab Units 11/06/24  0324 11/05/24  1741   WBC 10*3/mm3 9.84 12.33*   HEMOGLOBIN g/dL 7.3* 8.4*   HEMATOCRIT % 23.1* 27.6*   PLATELETS 10*3/mm3 307 300   MCV fL 85.9 89.0   MCH pg 27.1 27.1   MCHC g/dL 31.6 30.4*   RDW % 15.3 15.4   RDW-SD fl 47.8 50.1   MPV fL 9.9 9.9   NEUTROPHIL % %  --  82.3*   LYMPHOCYTE % %  --  5.7*   MONOCYTES % %  --  8.2   EOSINOPHIL % %  --  2.6   BASOPHIL % %  --  0.6   IMM GRAN % %  --  0.6*   NEUTROS ABS 10*3/mm3  --  10.15*   LYMPHS ABS 10*3/mm3  --  0.70   MONOS ABS 10*3/mm3  --  1.01*   EOS ABS 10*3/mm3  --  0.32   BASOS ABS 10*3/mm3  --  0.08   IMMATURE GRANS (ABS) 10*3/mm3  --  0.07*   NRBC /100 WBC  --  0.0                                 Results from last 7 days   Lab Units 11/05/24 1953   NITRITE UA  Negative   WBC UA /HPF 21-50*   BACTERIA UA /HPF 4+*   SQUAM EPITHEL UA /HPF 0-2     Results from last 7 days   Lab Units 11/06/24  0324 11/05/24 1953   SODIUM UR mmol/L  --  91   CREATININE UR mg/dL  --  82.6   CHLORIDE UR mmol/L  --  84   PROTEIN TOTAL URINE mg/dL  --  523.7   URIC ACID mg/dL 8.8*  " --        Imaging:  Imaging Results (Last 24 Hours)       Procedure Component Value Units Date/Time    XR Chest 1 View [[id number]] Collected: 11/05/24 1725     Updated: 11/05/24 1731    Narrative:      XR CHEST 1 VW-     INDICATION: Bilateral leg swelling and pain     COMPARISON: Chest radiographs dating back to 6/18/2016       Impression:      Low lung volumes. Lingular linear atelectasis/scarring. No  new focal consolidation. No pleural effusion or pneumothorax. Normal  size cardiomediastinal silhouette. Central pulmonary vasculature is  accentuated by low lung volumes but remains distinct/nondilated. Few  healed right-sided rib fractures.     This report was finalized on 11/5/2024 5:28 PM by Dr. Lewis Viramontes M.D on Workstation: [id number]                  I reviewed the patient's new clinical results / labs / tests / procedures      Assessment/Plan     Active Hospital Problems    Diagnosis  POA    **CHF exacerbation [I50.9]  Yes    Acute exacerbation of CHF (congestive heart failure) [I50.9]  Yes      Resolved Hospital Problems   No resolved problems to display.           Acute on chronic diastolic congestive heart failure in a patient with a history of hypertension.  Last 2D echo on 8/2024 revealing a normal ejection fraction with left ventricular hypertrophy, left atrial enlargement, trace AR, mild MR.  Patient blood pressure was suboptimally controlled on admission and now is much better controlled.  EKG with normal sinus rhythm and right bundle branch block.  Chest x-ray with vascular congestion.  Will check D-dimer and if it is positive we will need to check bilateral lower extremity venous ultrasound.  Nephrology started IV Bumex.  Urine electrolytes noted.  Uric acid elevated.  Monitor bladder scans.  Continue Zebeta/hydralazine/Hytrin and monitor blood pressure.  Mood disorder.  Continue Cymbalta.  Type 2 diabetes.  Continue to do long-acting insulin and Humalog.  Placed on sliding scale insulin.   Check A1c.  Hypothyroidism.  Continue current replacement check TSH  Dyslipidemia.  Continue Lipitor.  T and L-spine degenerative disc disease/scoliosis/osteoporosis.  Continue Lyrica.  Neuroendocrine tumor of the pancreas/history of C. difficile colitis.  Benign GI examination.  On octreotide which is nonformulary and will resume at discharge.  Tobacco abuse.  Counseled.  History of seizure disorder.  Not on any antiepileptic medication.  Negative CNS examination.  No recent seizures.  Stage IV chronic renal failure/metabolic acidosis.  Baseline creatinine is between 2 and 2.1.  Patient is volume overloaded.  Creatinine is about the baseline.  Nephrology is following.  Monitor renal function and electrolytes.  Abnormal UA.  No symptoms of UTI.  No antibiotic treatment indicated.  Anemia of chronic disease.  Hemoglobin worse than baseline.  Will workup anemia.  Will transfuse a single unit of packed RBCs to keep the hemoglobin more than 8 as she has active congestive heart failure.  VTE prophylaxis.  Will initiate heparin subcu.      Discussed my findings and plan of treatment with the patient.  Disposition.  To be determined based on clinical course.  Might require SNF.        Jazlyn Maynard MD  [facility]  11/06/24  13:21 EST

## 2024-11-06 NOTE — THERAPY EVALUATION
Patient Name: Halie Guidry  : 1940    MRN: [id number]                              Today's Date: 2024       Admit Date: 2024    Visit Dx:     ICD-10-CM ICD-9-CM   1. Acute on chronic congestive heart failure, unspecified heart failure type  I50.9 428.0   2. Bilateral leg edema  R60.0 782.3     Patient Active Problem List   Diagnosis    Compression fracture    Lumbar degenerative disc disease    Type 2 diabetes mellitus with hyperglycemia, with long-term current use of insulin    Hyperlipidemia    Benign essential hypertension    Primary hypothyroidism    Neuropathy    Osteoporosis    Proteinuria    Tobacco abuse    Generalized weakness    Spinal stenosis    Scoliosis    Arthritis    VBI (vertebrobasilar insufficiency)    Orthostatic hypotension    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    Severe hypothyroidism    Noncompliance with medication regimen    Vitamin D deficiency disease    Tremor    Seizure    Thoracic degenerative disc disease    Acute kidney injury (VIPUL) with acute tubular necrosis (ATN)    Hyperglycemia    Type II diabetes mellitus, uncontrolled    Lower abdominal pain    Transaminitis    Emphysematous cystitis    Choledocholithiasis    Hypokalemia    Hypoxia    Generalized abdominal pain    History of Clostridium difficile infection    History of ERCP    Hypomagnesemia    Anxiety disorder    Neuropathic pain    Intertrigo    Weakness of both lower extremities    Accelerated hypertension    Acute cystitis without hematuria    Left lower lobe pneumonia    Cellulitis and abscess of left lower extremity    Benign hypertension with CKD (chronic kidney disease) stage IV    Peripheral edema    Primary malignant neuroendocrine tumor of pancreas    Encounter for long-term (current) use of high-risk medication    Diabetic foot ulcer    Stage 3a chronic kidney disease    Pressure injury of buttock, stage 2    HTN (hypertension)    Anemia due to chronic kidney disease     Bilateral lower extremity edema    Cellulitis of right lower extremity    CKD (chronic kidney disease) stage 4, GFR 15-29 ml/min    Acute CHF    Bilateral cellulitis of lower leg    Acute UTI    UTI (urinary tract infection)    Acute UTI (urinary tract infection)    Metabolic acidosis    Cobalamin deficiency    Dependent edema    Gastroesophageal reflux disease    Mild neurocognitive disorder    Pancreatic neoplasm    Congestive heart failure    CHF exacerbation    Acute exacerbation of CHF (congestive heart failure)     Past Medical History:   Diagnosis Date    Acute metabolic encephalopathy 06/24/2022    Anxiety     Arthritis     Benign essential hypertension 08/20/2014    Bleeding disorder     Depression     Diabetes     Diabetes mellitus, type 2     Disc degeneration, lumbar     Headache, tension-type     Hyperlipidemia     Hypothyroidism     Neuropathy     Osteoporosis 09/09/2015    Pancreatic mass     Sept 2023, on Monthly Octreotide Injection    Peripheral neuropathy     Rotator cuff tear, left     Scoliosis     Shoulder pain     LEFT, TORN ROTATOR CUFF S/P FALL    Spinal stenosis      Past Surgical History:   Procedure Laterality Date    APPENDECTOMY      BILATERAL BREAST REDUCTION Bilateral 08/2015    CATARACT EXTRACTION  03/2015    CHOLECYSTECTOMY WITH INTRAOPERATIVE CHOLANGIOGRAM N/A 3/27/2022    Procedure: CHOLECYSTECTOMY LAPAROSCOPIC INTRAOPERATIVE CHOLANGIOGRAM;  Surgeon: Aiyana Hill MD;  Location: [facility] MAIN OR;  Service: General;  Laterality: N/A;    COLONOSCOPY  06/05/2015    WNL    ERCP N/A 2/25/2022    Procedure: ENDOSCOPIC RETROGRADE CHOLANGIOPANCREATOGRAPHY with sphincterotomy and balloon sweep;  Surgeon: Chilo Wilhelm MD;  Location: [facility] ENDOSCOPY;  Service: Gastroenterology;  Laterality: N/A;  PRE/POST - CBD stones    ERCP N/A 3/28/2022    Procedure: ENDOSCOPIC RETROGRADE CHOLANGIOPANCREATOGRAPHY WITH SPHINCTEROTOMY AND BALLOON SWEEP;  Surgeon: Chilo Wilhelm MD;   Location: [facility] ENDOSCOPY;  Service: Gastroenterology;  Laterality: N/A;  PRE: COMMON DUCT STONE  POST: COMMON DUCT STONE    KYPHOPLASTY      REDUCTION MAMMAPLASTY      TONSILLECTOMY        General Information       Row Name 11/06/24 1206          OT Time and Intention    Document Type evaluation  -MM     Mode of Treatment co-treatment;physical therapy;occupational therapy  -MM     Patient Effort adequate  -MM     Symptoms Noted During/After Treatment increased pain  -MM       Row Name 11/06/24 1206          General Information    Patient Profile Reviewed yes  -MM     Prior Level of Function independent:;w/c or scooter  able to complete transfer w/c to commode as well, requires some assist for bathing however mod (I) for other ADLs  -MM     Existing Precautions/Restrictions fall  -MM     Barriers to Rehab medically complex  -MM       Row Name 11/06/24 1206          Living Environment    People in Home facility resident  [facility]  -MM       Row Name 11/06/24 1206          Cognition    Orientation Status (Cognition) oriented x 3  -MM       Row Name 11/06/24 1206          Safety Issues/Impairments Affecting Functional Mobility    Safety Issues Affecting Function (Mobility) insight into deficits/self-awareness;safety precaution awareness  -MM     Impairments Affecting Function (Mobility) strength;balance;endurance/activity tolerance  -MM     Comment, Safety Issues/Impairments (Mobility) Co-treatment medically necessary and appropriate d/t pt's acuity level, decreased endurance and activity tolerance, and safety of patient and staff. Treatment focused on progression of care and goals established in POC. Gait belt and non-skid socks worn.  -MM               User Key  (r) = Recorded By, (t) = Taken By, (c) = Cosigned By      Initials Name Provider Type    MM Arcelia Hoyos OT Occupational Therapist                     Mobility/ADL's       Row Name 11/06/24 1212          Bed Mobility    Bed Mobility supine-sit;sit-supine  -MM      Supine-Sit [place] (Bed Mobility) standby assist;verbal cues  -MM     Sit-Supine [place] (Bed Mobility) standby assist;verbal cues  -MM     Assistive Device (Bed Mobility) bed rails;head of bed elevated  -MM       Row Name 11/06/24 1212          Transfers    Transfers sit-stand transfer  -MM     Comment, (Transfers) x2 STS attempts, very difficult to bear weight and clear bottom  -MM       Row Name 11/06/24 1212          Sit-Stand Transfer    Sit-Stand [place] (Transfers) maximum assist (25% patient effort);2 person assist;verbal cues;nonverbal cues (demo/gesture)  -MM     Assistive Device (Sit-Stand Transfers) walker, front-wheeled  -MM     Comment, (Sit-Stand Transfer) unable to clear bottom and very quick to fatigue  -MM       Row Name 11/06/24 1212          Functional Mobility    Functional Mobility- Ind. Level unable to perform  -MM       Row Name 11/06/24 1212          Activities of Daily Living    BADL Assessment/Intervention lower body dressing;toileting;feeding  -MM       Row Name 11/06/24 1212          Lower Body Dressing Assessment/Training    [place] Level (Lower Body Dressing) don;socks;dependent (less than 25% patient effort)  -MM     Comment, (Lower Body Dressing) decreased func reach, legs wrapped by nursing, painful throughout bed mobility  -MM       Row Name 11/06/24 1212          Toileting Assessment/Training    [place] Level (Toileting) toileting skills;dependent (less than 25% patient effort)  -MM     Comment, (Toileting) bed level currently, lc donned  -MM       Row Name 11/06/24 1212          Self-Feeding Assessment/Training    [place] Level (Feeding) feeding skills;set up  -MM               User Key  (r) = Recorded By, (t) = Taken By, (c) = Cosigned By      Initials Name Provider Type    Arcelia Ahmadi OT Occupational Therapist                   Obj/Interventions       Row Name 11/06/24 1213          Sensory Assessment (Somatosensory)    Sensory  Assessment (Somatosensory) UE sensation intact  -MM       Row Name 11/06/24 1213          Vision Assessment/Intervention    Visual Impairment/Limitations WFL  -MM       Row Name 11/06/24 1213          Range of Motion Comprehensive    Comment, General Range of Motion LUE prev RC tear, less than 50% end range  -MM       Row Name 11/06/24 1213          Strength Comprehensive (MMT)    General Manual Muscle Testing (MMT) Assessment upper extremity strength deficits identified  -MM     Comment, General Manual Muscle Testing (MMT) Assessment RUE 3+/5, LUE 2+/5 in shoulder plane  -MM       Row Name 11/06/24 1213          Motor Skills    Motor Skills functional endurance;coordination  -MM     Coordination WFL;upper extremity;bilateral  -MM     Functional Endurance quick to fatigue  -MM       Row Name 11/06/24 1213          Balance    Balance Assessment sitting static balance;sitting dynamic balance;sit to stand dynamic balance  -MM     Static Sitting Balance standby assist  -MM     Dynamic Sitting Balance standby assist;contact guard  -MM     Position, Sitting Balance sitting edge of bed;unsupported  -MM     Sit to Stand Dynamic Balance maximum assist;2-person assist  -MM     Static Standing Balance maximum assist  -MM     Position/Device Used, Standing Balance supported  -MM               User Key  (r) = Recorded By, (t) = Taken By, (c) = Cosigned By      Initials Name Provider Type    Arcelia Ahmadi OT Occupational Therapist                   Goals/Plan       [facility] Name 11/06/24 1220          Transfer Goal 1 (OT)    Activity/Assistive Device (Transfer Goal 1, OT) sit-to-stand/stand-to-sit;bed-to-chair/chair-to-bed;toilet;commode, bedside without drop arms  -MM     [place] Level/Cues Needed (Transfer Goal 1, OT) minimum assist (75% or more patient effort)  -MM     Time Frame (Transfer Goal 1, OT) short term goal (STG);2 weeks  -MM     Progress/Outcome (Transfer Goal 1, OT) goal ongoing  -MM       Row Name 11/06/24  1220          Bathing Goal 1 (OT)    Activity/Device (Bathing Goal 1, OT) upper body bathing;lower body bathing  -MM     [place] Level/Cues Needed (Bathing Goal 1, OT) minimum assist (75% or more patient effort)  -MM     Time Frame (Bathing Goal 1, OT) short term goal (STG);2 weeks  -MM     Progress/Outcomes (Bathing Goal 1, OT) goal ongoing  -MM       Row Name 11/06/24 1220          Dressing Goal 1 (OT)    Activity/Device (Dressing Goal 1, OT) lower body dressing  -MM     [place]/Cues Needed (Dressing Goal 1, OT) minimum assist (75% or more patient effort)  -MM     Time Frame (Dressing Goal 1, OT) short term goal (STG);2 weeks  -MM     Progress/Outcome (Dressing Goal 1, OT) goal ongoing  -MM       Row Name 11/06/24 1220          Toileting Goal 1 (OT)    Activity/Device (Toileting Goal 1, OT) toileting skills, all  -MM     [place] Level/Cues Needed (Toileting Goal 1, OT) minimum assist (75% or more patient effort)  -MM     Time Frame (Toileting Goal 1, OT) short term goal (STG);2 weeks  -MM     Progress/Outcome (Toileting Goal 1, OT) goal ongoing  -MM       Row Name 11/06/24 1220          Therapy Assessment/Plan (OT)    Planned Therapy Interventions (OT) activity tolerance training;BADL retraining;neuromuscular control/coordination retraining;patient/caregiver education/training;transfer/mobility retraining;strengthening exercise;ROM/therapeutic exercise;occupation/activity based interventions;functional balance retraining  -MM               User Key  (r) = Recorded By, (t) = Taken By, (c) = Cosigned By      Initials Name Provider Type    Arcelia Ahmadi OT Occupational Therapist                   Clinical Impression       Row Name 11/06/24 1217          Pain Assessment    Pretreatment Pain Rating 0/10 - no pain  -MM     Posttreatment Pain Rating 0/10 - no pain  -MM       Row Name 11/06/24 1217          Plan of Care Review    Plan of Care Reviewed With patient  -MM     Progress no change  -MM      Outcome Evaluation pt is an 85 yo female admitted for BLE leg swelling and increased SOA. She is seen this date for OT eval, she has had multiple hospitalizations over the past year. She resides at [facility] where she completes w/c transfer mod (I) and w/c <> commode mod (I), needs some assist with bathing from staff, however other ADLs mostly (I). She presents this date below her baseline with decreased activity tolerance and overall strength impacting (I) with ADLs and functional transfers. She requires max A x2 for attempts to stand, unable to clear bottom due to significant BLE weakness. She requires total A for LBD this date and not safe to progress with OOB activity to BSC/chair. She will continue to benefit from skilled OT to address noted func deficits and progress toward prior level of (I) prior to d/c. anticipate d/c [facility] vs SNF pending progress with therapy  -MM       Row Name 11/06/24 1217          Therapy Assessment/Plan (OT)    Rehab Potential (OT) good  -MM     Criteria for Skilled Therapeutic Interventions Met (OT) skilled treatment is necessary;meets criteria  -MM     Therapy Frequency (OT) 5 times/wk  -MM       Row Name 11/06/24 1217          Therapy Plan Review/Discharge Plan (OT)    Anticipated Discharge Disposition (OT) skilled nursing facility;assisted living  pending progress  -MM       Row Name 11/06/24 1217          Vital Signs    O2 Delivery Pre Treatment room air  -MM       Row Name 11/06/24 1217          Positioning and Restraints    Pre-Treatment Position in bed  -MM     Post Treatment Position bed  -MM     In Bed notified nsg;fowlers;call light within reach;encouraged to call for assist;exit alarm on;side rails up x3  -MM               User Key  (r) = Recorded By, (t) = Taken By, (c) = Cosigned By      Initials Name Provider Type    Arcelia Ahmadi OT Occupational Therapist                   Outcome Measures       Row Name 11/06/24 1221          How much help from another is currently  needed...    Putting on and taking off regular lower body clothing? 1  -MM     Bathing (including washing, rinsing, and drying) 2  -MM     Toileting (which includes using toilet bed pan or urinal) 1  -MM     Putting on and taking off regular upper body clothing 3  -MM     Taking care of personal grooming (such as brushing teeth) 3  -MM     Eating meals 4  -MM     AM-PAC 6 Clicks Score (OT) 14  -MM       Row Name 11/06/24 1040          How much help from another person do you currently need...    Turning from your back to your side while in flat bed without using bedrails? 3  -MS     Moving from lying on back to sitting on the side of a flat bed without bedrails? 2  -MS     Moving to and from a bed to a chair (including a wheelchair)? 1  -MS     Standing up from a chair using your arms (e.g., wheelchair, bedside chair)? 1  -MS     Climbing 3-5 steps with a railing? 1  -MS     To walk in hospital room? 1  -MS     AM-PAC 6 Clicks Score (PT) 9  -MS       Row Name 11/06/24 1221          Modified [place]o Scale    Modified [place] Scale 4 - Moderately severe disability.  Unable to walk without assistance, and unable to attend to own bodily needs without assistance.  -MM       Row Name 11/06/24 1221          Functional Assessment    Outcome Measure Options AM-PAC 6 Clicks Daily Activity (OT);Modified [place]  -MM               User Key  (r) = Recorded By, (t) = Taken By, (c) = Cosigned By      Initials Name Provider Type    MS ChenVeronica PT Physical Therapist    Arcelia Ahmadi OT Occupational Therapist                    Occupational Therapy Education       Title: PT OT SLP Therapies (In Progress)       Topic: Occupational Therapy (Done)       Point: ADL training (Done)       Description:   Instruct learner(s) on proper safety adaptation and remediation techniques during self care or transfers.   Instruct in proper use of assistive devices.                  Learning Progress Summary            Patient Acceptance,  E, VU by MM at 11/6/2024 1221    Comment: role of OT                      Point: Precautions (Done)       Description:   Instruct learner(s) on prescribed precautions during self-care and functional transfers.                  Learning Progress Summary            Patient Acceptance, E, VU by MM at 11/6/2024 1221    Comment: role of OT                      Point: Body mechanics (Done)       Description:   Instruct learner(s) on proper positioning and spine alignment during self-care, functional mobility activities and/or exercises.                  Learning Progress Summary            Patient Acceptance, E, VU by MM at 11/6/2024 1221    Comment: role of OT                                      User Key       Initials Effective Dates Name Provider Type Discipline    AUGUSTUS 05/31/24 -  Arcelia Hoyos OT Occupational Therapist OT                  OT Recommendation and Plan  Planned Therapy Interventions (OT): activity tolerance training, BADL retraining, neuromuscular control/coordination retraining, patient/caregiver education/training, transfer/mobility retraining, strengthening exercise, ROM/therapeutic exercise, occupation/activity based interventions, functional balance retraining  Therapy Frequency (OT): 5 times/wk  Plan of Care Review  Plan of Care Reviewed With: patient  Progress: no change  Outcome Evaluation: pt is an 85 yo female admitted for BLE leg swelling and increased SOA. She is seen this date for OT eval, she has had multiple hospitalizations over the past year. She resides at [facility] where she completes w/c transfer mod (I) and w/c <> commode mod (I), needs some assist with bathing from staff, however other ADLs mostly (I). She presents this date below her baseline with decreased activity tolerance and overall strength impacting (I) with ADLs and functional transfers. She requires max A x2 for attempts to stand, unable to clear bottom due to significant BLE weakness. She requires total A for LBD this date and  not safe to progress with OOB activity to BSC/chair. She will continue to benefit from skilled OT to address noted func deficits and progress toward prior level of (I) prior to d/c. anticipate d/c [facility] vs SNF pending progress with therapy     Time Calculation:   Evaluation Complexity (OT)  Review Occupational Profile/Medical/Therapy History Complexity: expanded/moderate complexity  Assessment, Occupational Performance/Identification of Deficit Complexity: 3-5 performance deficits  Clinical Decision Making Complexity (OT): detailed assessment/moderate complexity  Overall Complexity of Evaluation (OT): moderate complexity     Time Calculation- OT       Row Name 11/06/24 1221             Time Calculation- OT    OT Start Time 0835  -MM      OT Stop Time 0859  -MM      OT Time Calculation (min) 24 min  -MM      Total Timed Code Minutes- OT 17 minute(s)  -MM      OT Received On 11/06/24  -MM      OT - Next Appointment 11/07/24  -MM      OT Goal Re-Cert Due Date 11/20/24  -MM         Timed Charges    93888 - OT Therapeutic Activity Minutes 10  -MM      29233 - OT Self Care/Mgmt Minutes 7  -MM         Untimed Charges    OT Eval/Re-eval Minutes 7  -MM         Total Minutes    Timed Charges Total Minutes 17  -MM      Untimed Charges Total Minutes 7  -MM       Total Minutes 24  -MM                User Key  (r) = Recorded By, (t) = Taken By, (c) = Cosigned By      Initials Name Provider Type    Arcelia Ahmadi OT Occupational Therapist                  Therapy Charges for Today       Code Description Service Date Service Provider Modifiers Qty    32797114573 HC OT THERAPEUTIC ACT EA 15 MIN 11/6/2024 Arcelia Hoyos OT GO 1    55520517879 HC OT EVAL MOD COMPLEXITY 2 11/6/2024 Arcelia Hoyos OT GO 1                 Arcelia Hoyos OT  11/6/2024

## 2024-11-06 NOTE — DISCHARGE PLACEMENT REQUEST
"Tabatha Guidry (84 y.o. Female)       Date of Birth   1940    Social Security Number       Address   [address]  [place] [id number]    Home Phone   [phone]    MRN   [id number]       Sabianist   Protestant    Marital Status                               Admission Date   11/5/24    Admission Type   Emergency    Admitting Provider   Kim Barnard MD    Attending Provider   Jazlyn Maynard MD    Department, Room/Bed   [address], [address]       Discharge Date       Discharge Disposition       Discharge Destination                                 Attending Provider: Jazlyn Maynard MD    Allergies: Sulfa Antibiotics    Isolation: Contact   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), Candida Auris (rule out) (11/05/24)   Code Status: No CPR    Ht: 165.1 cm (65\")   Wt: 98.9 kg (218 lb 0.6 oz)    Admission Cmt: None   Principal Problem: CHF exacerbation [I50.9]                   Active Insurance as of 11/5/2024       Primary Coverage       Payor Plan Insurance Group Employer/Plan Group    [facility] MEDICARE REPLACEMENT [facility] [employer] ADV [employer] [id number]       Payor Plan Address Payor Plan Phone Number Payor Plan Fax Number Effective Dates    [address] [address]   1/1/2023 - None Entered    [facility] [id number]         Subscriber Name Subscriber Birth Date Member ID       TABATHA GUIDRY 1940 [id number]                     Emergency Contacts        (Rel.) Home Phone Work Phone Mobile Phone    Derrick Guidry (HCS) (Son) [phone] -- [phone]    Josse Guidry (HCS) (Son) -- -- [phone]    JAYME GIUDRY (Grandchild) -- -- [phone]                "

## 2024-11-06 NOTE — PLAN OF CARE
Goal Outcome Evaluation:  Plan of Care Reviewed With: patient        Progress: no change  Outcome Evaluation: pt is an 83 yo female admitted for BLE leg swelling and increased SOA. She is seen this date for OT eval, she has had multiple hospitalizations over the past year. She resides at [facility] where she completes w/c transfer mod (I) and w/c <> commode mod (I), needs some assist with bathing from staff, however other ADLs mostly (I). She presents this date below her baseline with decreased activity tolerance and overall strength impacting (I) with ADLs and functional transfers. She requires max A x2 for attempts to stand, unable to clear bottom due to significant BLE weakness. She requires total A for LBD this date and not safe to progress with OOB activity to BSC/chair. She will continue to benefit from skilled OT to address noted func deficits and progress toward prior level of (I) prior to d/c. anticipate d/c [facility] vs SNF pending progress with therapy    Anticipated Discharge Disposition (OT): skilled nursing facility, assisted living (pending progress)

## 2024-11-06 NOTE — H&P
HISTORY AND PHYSICAL   [facility]        Date of Admission: 2024  Patient Identification:  Name: Halie Guidry  Age: 84 y.o.  Sex: female  :  1940  MRN: [id number]                     Primary Care Physician: Napoleon Mead MD    Chief Complaint:  84 year old female presented to the emergency room with leg swelling and shortness of breath; she was admitted last month and taken off bumex; she denies chest pain; no fever or chills     History of Present Illness:   As above    Past Medical History:  Past Medical History:   Diagnosis Date    Acute metabolic encephalopathy 2022    Anxiety     Arthritis     Benign essential hypertension 2014    Bleeding disorder     Depression     Diabetes     Diabetes mellitus, type 2     Disc degeneration, lumbar     Headache, tension-type     Hyperlipidemia     Hypothyroidism     Neuropathy     Osteoporosis 2015    Pancreatic mass     2023, on Monthly Octreotide Injection    Peripheral neuropathy     Rotator cuff tear, left     Scoliosis     Shoulder pain     LEFT, TORN ROTATOR CUFF S/P FALL    Spinal stenosis      Past Surgical History:  Past Surgical History:   Procedure Laterality Date    APPENDECTOMY      BILATERAL BREAST REDUCTION Bilateral 2015    CATARACT EXTRACTION  2015    CHOLECYSTECTOMY WITH INTRAOPERATIVE CHOLANGIOGRAM N/A 3/27/2022    Procedure: CHOLECYSTECTOMY LAPAROSCOPIC INTRAOPERATIVE CHOLANGIOGRAM;  Surgeon: Aiyana Hill MD;  Location: [facility] MAIN OR;  Service: General;  Laterality: N/A;    COLONOSCOPY  2015    WNL    ERCP N/A 2022    Procedure: ENDOSCOPIC RETROGRADE CHOLANGIOPANCREATOGRAPHY with sphincterotomy and balloon sweep;  Surgeon: Chilo Wilhelm MD;  Location: [facility] ENDOSCOPY;  Service: Gastroenterology;  Laterality: N/A;  PRE/POST - CBD stones    ERCP N/A 3/28/2022    Procedure: ENDOSCOPIC RETROGRADE CHOLANGIOPANCREATOGRAPHY WITH SPHINCTEROTOMY AND BALLOON SWEEP;  Surgeon:  Chilo Wilhelm MD;  Location: [facility] ENDOSCOPY;  Service: Gastroenterology;  Laterality: N/A;  PRE: COMMON DUCT STONE  POST: COMMON DUCT STONE    KYPHOPLASTY      REDUCTION MAMMAPLASTY      TONSILLECTOMY        Home Meds:  Medications Prior to Admission   Medication Sig Dispense Refill Last Dose/Taking    acetaminophen (TYLENOL) 325 MG tablet Take 2 tablets by mouth Every 6 (Six) Hours As Needed for Mild Pain or Moderate Pain.       ammonium lactate (AMLACTIN) 12 % cream Apply 1 Application topically to the appropriate area as directed 2 (Two) Times a Day.       atorvastatin (LIPITOR) 80 MG tablet Take 1 tablet by mouth Every Night. Indications: High Amount of Fats in the Blood 30 tablet 2     bisoprolol (ZEBeta) 5 MG tablet Take 1 tablet by mouth Daily. 90 tablet 3     DULoxetine (CYMBALTA) 30 MG capsule Take 1 capsule by mouth Daily. 90 capsule 3     ferrous sulfate 325 (65 FE) MG tablet Take 1 tablet by mouth Every Other Day.       fluticasone (FLONASE) 50 MCG/ACT nasal spray Administer 2 sprays into the nostril(s) as directed by provider Daily. 16 g 11     hydrALAZINE (APRESOLINE) 50 MG tablet Take 1 tablet by mouth Every 8 (Eight) Hours. 270 tablet 1     hydrocortisone-bacitracin-zinc oxide-nystatin (MAGIC BARRIER) Apply 1 Application topically to the appropriate area as directed 2 (Two) Times a Day.       Insulin Glargine, 2 Unit Dial, (TOUJEO) 300 UNIT/ML solution pen-injector injection Inject 90 Units under the skin into the appropriate area as directed Daily. 30 mL 0     Insulin Lispro, 1 Unit Dial, (HumaLOG KwikPen) 100 UNIT/ML solution pen-injector Inject 15-20 Units under the skin into the appropriate area as directed 3 (Three) Times a Day Before Meals. 60 mL 0     Insulin Pen Needle (BD Pen Needle Naila 2nd Gen) 32G X 4 MM misc Use 1 pen needle 4 (Four) Times a Day. 400 each 2     lansoprazole (PREVACID) 30 MG capsule TAKE 1 CAPSULE DAILY 30 capsule 11     levothyroxine (SYNTHROID, LEVOTHROID) 137  MCG tablet Take 1 tablet by mouth Every Morning. 90 tablet 1     octreotide (sandoSTATIN) 30 MG injection Inject 30 mg into the appropriate muscle as directed by prescriber Every 28 (Twenty-Eight) Days. Next dose 2024       pregabalin (LYRICA) 75 MG capsule Take 1 capsule by mouth 2 (Two) Times a Day for 3 days. 6 capsule 0     terazosin (HYTRIN) 1 MG capsule Take 1 capsule by mouth Every Night. 90 capsule 1        Allergies:  Allergies   Allergen Reactions    Sulfa Antibiotics Unknown - High Severity     Pt says it was a long time ago and I don't remember but it wasn't good.      Immunizations:  Immunization History   Administered Date(s) Administered    COVID-19 (PFIZER) Purple Cap Monovalent 2021, 2021    FLUAD TRI 65YR+ 10/16/2019    FluMist 2-49yrs 10/03/2012    Fluad Quad 65+ 2020    Fluzone (or Fluarix & Flulaval for VFC) >6mos 2021    Fluzone High-Dose 65+YRS 10/09/2017, 10/09/2017    Pneumococcal Conjugate 13-Valent (PCV13) 2017, 2017    Pneumococcal, Unspecified 10/01/2009    Shingrix 04/15/2019    Td (TDVAX) 2016    Tdap 2017, 2017    Zostavax 2010     Social History:   Social History     Social History Narrative    Not on file     Social History     Socioeconomic History    Marital status:     Number of children: 3   Tobacco Use    Smoking status: Former     Current packs/day: 0.00     Average packs/day: 1 pack/day for 40.0 years (40.0 ttl pk-yrs)     Types: Cigarettes     Start date:      Quit date: 2013     Years since quittin.8    Smokeless tobacco: Never   Vaping Use    Vaping status: Never Used   Substance and Sexual Activity    Alcohol use: No    Drug use: No    Sexual activity: Defer       Family History:  Family History   Problem Relation Age of Onset    Bone cancer Mother     No Known Problems Father     Heart disease Daughter         Review of Systems  See history of present illness and past medical history.   "Patient denies headache, dizziness, syncope, falls, trauma, change in vision, change in hearing, change in taste, changes in weight, changes in appetite, focal weakness, numbness, or paresthesia.  Patient denies chest pain, palpitations, dyspnea, orthopnea, PND, cough, sinus pressure, rhinorrhea, epistaxis, hemoptysis, nausea, vomiting,hematemesis, diarrhea, constipation or hematochezia.  Denies cold or heat intolerance, polydipsia, polyuria, polyphagia. Denies hematuria, pyuria, dysuria, hesitancy, frequency or urgency. Denies consumption of raw and under cooked meats foods or change in water source.  Denies fever, chills, sweats, night sweats.  Denies missing any routine medications. Remainder of ROS is negative.    Objective:  T Max 24 hrs: Temp (24hrs), Av.9 °F (36.6 °C), Min:97.9 °F (36.6 °C), Max:97.9 °F (36.6 °C)    Vitals Ranges:   Temp:  [97.9 °F (36.6 °C)] 97.9 °F (36.6 °C)  Heart Rate:  [66-71] 69  Resp:  [18] 18  BP: (164-209)/(78-90) 199/78      Exam:  BP (!) 199/78   Pulse 69   Temp 97.9 °F (36.6 °C) (Tympanic)   Resp 18   Ht 165.1 cm (65\")   Wt 98.2 kg (216 lb 7.9 oz)   SpO2 96%   BMI 36.03 kg/m²     General Appearance:    Alert, cooperative, no distress, appears stated age   Head:    Normocephalic, without obvious abnormality, atraumatic   Eyes:    PERRL, conjunctivae/corneas clear, EOM's intact, both eyes   Ears:    Normal external ear canals, both ears   Nose:   Nares normal, septum midline, mucosa normal, no drainage    or sinus tenderness   Throat:   Lips, mucosa, and tongue normal   Neck:   Supple, symmetrical, trachea midline, no adenopathy;     thyroid:  no enlargement/tenderness/nodules; no carotid    bruit or JVD   Back:     Symmetric, no curvature, ROM normal, no CVA tenderness   Lungs:     Clear to auscultation bilaterally, respirations unlabored   Chest Wall:    No tenderness or deformity    Heart:    Regular rate and rhythm, S1 and S2 normal, no murmur, rub   or gallop "   Abdomen:     Soft, nontender, bowel sounds active all four quadrants,     no masses, no hepatomegaly, no splenomegaly   Extremities:   Extremities normal, atraumatic, no cyanosis or edema                       .    Data Review:  Labs in chart were reviewed.  WBC   Date Value Ref Range Status   11/05/2024 12.33 (H) 3.40 - 10.80 10*3/mm3 Final     Hemoglobin   Date Value Ref Range Status   11/05/2024 8.4 (L) 12.0 - 15.9 g/dL Final     Hematocrit   Date Value Ref Range Status   11/05/2024 27.6 (L) 34.0 - 46.6 % Final     Platelets   Date Value Ref Range Status   11/05/2024 300 140 - 450 10*3/mm3 Final     Sodium   Date Value Ref Range Status   11/05/2024 141 136 - 145 mmol/L Final     Potassium   Date Value Ref Range Status   11/05/2024 4.4 3.5 - 5.2 mmol/L Final     Chloride   Date Value Ref Range Status   11/05/2024 111 (H) 98 - 107 mmol/L Final     CO2   Date Value Ref Range Status   11/05/2024 18.4 (L) 22.0 - 29.0 mmol/L Final     BUN   Date Value Ref Range Status   11/05/2024 35 (H) 8 - 23 mg/dL Final     Creatinine   Date Value Ref Range Status   11/05/2024 2.11 (H) 0.57 - 1.00 mg/dL Final     Glucose   Date Value Ref Range Status   11/05/2024 154 (H) 65 - 99 mg/dL Final     Calcium   Date Value Ref Range Status   11/05/2024 8.5 (L) 8.6 - 10.5 mg/dL Final     Magnesium   Date Value Ref Range Status   11/05/2024 1.9 1.6 - 2.4 mg/dL Final     AST (SGOT)   Date Value Ref Range Status   11/05/2024 8 1 - 32 U/L Final     ALT (SGPT)   Date Value Ref Range Status   11/05/2024 5 1 - 33 U/L Final     Alkaline Phosphatase   Date Value Ref Range Status   11/05/2024 111 39 - 117 U/L Final                Imaging Results (All)       Procedure Component Value Units Date/Time    XR Chest 1 View [[id number]] Collected: 11/05/24 1725     Updated: 11/05/24 1731    Narrative:      XR CHEST 1 VW-     INDICATION: Bilateral leg swelling and pain     COMPARISON: Chest radiographs dating back to 6/18/2016       Impression:      Low lung  volumes. Lingular linear atelectasis/scarring. No  new focal consolidation. No pleural effusion or pneumothorax. Normal  size cardiomediastinal silhouette. Central pulmonary vasculature is  accentuated by low lung volumes but remains distinct/nondilated. Few  healed right-sided rib fractures.     This report was finalized on 11/5/2024 5:28 PM by Dr. Lewis Viramontes M.D on Workstation: [id number]                 Assessment:  Active Hospital Problems    Diagnosis  POA    **CHF exacerbation [I50.9]  Yes    Acute exacerbation of CHF (congestive heart failure) [I50.9]  Yes      Resolved Hospital Problems   No resolved problems to display.   Diabetes  Hypertension  Hyperlipidemia  Hypothyroidism  anemia    Plan:  Will continue diuresis  Ask nephrology to see her  Trend labs  Monitor on telemetry  Accu checks, sliding scale  Dw patient and ed provider    Kim Barnard MD  11/5/2024  22:14 EST

## 2024-11-06 NOTE — CONSULTS
[facility] Consult Note      Patient Name: Halie Guidry  : 1940  MRN: [id number]  Primary Care Physician:  Napoleon Mead MD  Referring Physician: Kim Banrard MD  Date of admission: 2024    Subjective     Reason for Consult: CKD stage IV    HPI:   Halie Guidry is a 84 y.o. female with CKD stage IV (SCr 2.0-2.2 as of ), recent VIPUL's, type II DM with polyneuropathy, hypothyroidism, and hypertension, presented to the hospital on 2024 for progressively worsening dyspnea on exertion and BLE edema for the past few days.  From 10/12/2024 to 10/19/2024, patient was hospitalized for VIPUL due to poorly controlled diabetes, creatinine was 2.17 upon discharge, Bumex was also discontinued at that time.    In the ER at this time, SCr 2.11, urine's positive for 3+ protein, 1+ leukocytes, and 4+ bacteria.  Patient was given one-time dose IV Lasix 80 mg in the ER with good urine output.  Today, SCr down to 2.07.    Patient denies chest pain, dizziness/lightheadedness, urinary symptoms, or N/V/D, though has very poor p.o. intake in the past few days; denies excessive fluid intake, but reports food at assisted living is a little salty.    Review of Systems:   14 point review of systems is otherwise negative except for mentioned above on HPI    Personal History     Past Medical History:   Diagnosis Date    Acute metabolic encephalopathy 2022    Anxiety     Arthritis     Benign essential hypertension 2014    Bleeding disorder     Depression     Diabetes     Diabetes mellitus, type 2     Disc degeneration, lumbar     Headache, tension-type     Hyperlipidemia     Hypothyroidism     Neuropathy     Osteoporosis 2015    Pancreatic mass     2023, on Monthly Octreotide Injection    Peripheral neuropathy     Rotator cuff tear, left     Scoliosis     Shoulder pain     LEFT, TORN ROTATOR CUFF S/P FALL    Spinal stenosis        Past Surgical  History:   Procedure Laterality Date    APPENDECTOMY      BILATERAL BREAST REDUCTION Bilateral 08/2015    CATARACT EXTRACTION  03/2015    CHOLECYSTECTOMY WITH INTRAOPERATIVE CHOLANGIOGRAM N/A 3/27/2022    Procedure: CHOLECYSTECTOMY LAPAROSCOPIC INTRAOPERATIVE CHOLANGIOGRAM;  Surgeon: Aiyana Hill MD;  Location: [facility] OR;  Service: General;  Laterality: N/A;    COLONOSCOPY  06/05/2015    WNL    ERCP N/A 2/25/2022    Procedure: ENDOSCOPIC RETROGRADE CHOLANGIOPANCREATOGRAPHY with sphincterotomy and balloon sweep;  Surgeon: Chilo Wilhelm MD;  Location: [facility] ENDOSCOPY;  Service: Gastroenterology;  Laterality: N/A;  PRE/POST - CBD stones    ERCP N/A 3/28/2022    Procedure: ENDOSCOPIC RETROGRADE CHOLANGIOPANCREATOGRAPHY WITH SPHINCTEROTOMY AND BALLOON SWEEP;  Surgeon: Chilo Wilhelm MD;  Location: [facility] ENDOSCOPY;  Service: Gastroenterology;  Laterality: N/A;  PRE: COMMON DUCT STONE  POST: COMMON DUCT STONE    KYPHOPLASTY      REDUCTION MAMMAPLASTY      TONSILLECTOMY         Family History: family history includes Bone cancer in her mother; Heart disease in her daughter; No Known Problems in her father.    Social History:  reports that she quit smoking about 11 years ago. Her smoking use included cigarettes. She started smoking about 51 years ago. She has a 40 pack-year smoking history. She has never used smokeless tobacco. She reports that she does not drink alcohol and does not use drugs.    Home Medications:  Prior to Admission medications    Medication Sig Start Date End Date Taking? Authorizing Provider   acetaminophen (TYLENOL) 325 MG tablet Take 2 tablets by mouth Every 6 (Six) Hours As Needed for Mild Pain or Moderate Pain. 9/4/24  Yes Balbina Painting MD   atorvastatin (LIPITOR) 80 MG tablet Take 1 tablet by mouth Every Night. Indications: High Amount of Fats in the Blood 3/14/24  Yes Lilia Orona APRN   bisoprolol (ZEBeta) 5 MG tablet Take 1 tablet by mouth Daily. 10/8/24  Yes  Napoleon Mead MD   DULoxetine (CYMBALTA) 30 MG capsule Take 1 capsule by mouth Daily. 10/8/24  Yes Napoleon Mead MD   ferrous sulfate 325 (65 FE) MG tablet Take 1 tablet by mouth Every Other Day. 9/6/24  Yes Balbina Painting MD   hydrALAZINE (APRESOLINE) 50 MG tablet Take 1 tablet by mouth Every 8 (Eight) Hours. 10/8/24  Yes Napoleon Mead MD   hydrocortisone-bacitracin-zinc oxide-nystatin (MAGIC BARRIER) Apply 1 Application topically to the appropriate area as directed 2 (Two) Times a Day. 5/1/24  Yes Raulito Disla MD   Insulin Glargine, 2 Unit Dial, (TOUJEO) 300 UNIT/ML solution pen-injector injection Inject 90 Units under the skin into the appropriate area as directed Daily. 9/10/24  Yes Lilia Orona APRN   Insulin Lispro, 1 Unit Dial, (HumaLOG KwikPen) 100 UNIT/ML solution pen-injector Inject 15-20 Units under the skin into the appropriate area as directed 3 (Three) Times a Day Before Meals. 9/10/24  Yes Lilia Orona APRN   Insulin Pen Needle (BD Pen Needle Naila 2nd Gen) 32G X 4 MM misc Use 1 pen needle 4 (Four) Times a Day. 9/10/24  Yes Lilia Orona APRN   lansoprazole (PREVACID) 30 MG capsule TAKE 1 CAPSULE DAILY 11/15/23  Yes Napoleon Gutierrez MD   levothyroxine (SYNTHROID, LEVOTHROID) 137 MCG tablet Take 1 tablet by mouth Every Morning. 10/8/24  Yes Napoleon Mead MD   octreotide (sandoSTATIN) 30 MG injection Inject 30 mg into the appropriate muscle as directed by prescriber Every 28 (Twenty-Eight) Days. Next dose 9/16/2024   Yes Melvin Gusman MD   terazosin (HYTRIN) 1 MG capsule Take 1 capsule by mouth Every Night. 10/8/24  Yes Napoleon Mead MD   ammonium lactate (AMLACTIN) 12 % cream Apply 1 Application topically to the appropriate area as directed 2 (Two) Times a Day. 5/1/24   Raulito Disla MD   fluticasone (FLONASE) 50 MCG/ACT nasal spray Administer 2 sprays into the nostril(s) as directed by provider Daily. 10/8/24   Napoleon Mead MD   pregabalin (LYRICA) 75  MG capsule Take 1 capsule by mouth 2 (Two) Times a Day for 3 days. 9/4/24 10/10/24  Balbina Painting MD       Allergies:  Allergies   Allergen Reactions    Sulfa Antibiotics Unknown - High Severity     Pt says it was a long time ago and I don't remember but it wasn't good.        Objective     Vitals:   Temp:  [97.5 °F (36.4 °C)-98.2 °F (36.8 °C)] 97.9 °F (36.6 °C)  Heart Rate:  [64-71] 67  Resp:  [18] 18  BP: (141-209)/(49-90) 176/59  Flow (L/min) (Oxygen Therapy):  [1] 1    Intake/Output Summary (Last 24 hours) at 11/6/2024 1029  Last data filed at 11/6/2024 0059  Gross per 24 hour   Intake --   Output 500 ml   Net -500 ml       Physical Exam:   Constitutional: Awake, currently ill, no acute distress, morbidly obese  HEENT: Sclera anicteric, no conjunctival injection  Neck: No JVD  Respiratory: Diminished to auscultation, breathing effort nonlabored  Cardiovascular: RRR, no murmurs, no rubs, + R carotid bruit  Gastrointestinal: Positive bowel sounds, abdomen is soft, nontender and nondistended  : No palpable bladder  Musculoskeletal: 2+ BLE edema with erythema, no clubbing or cyanosis, lower extremity wrapped with Ace wrapping.  Psychiatric: Flat affect, cooperative  Neurologic: Oriented x3, moving all extremities, normal speech and mental status, no asterixis  Skin: Warm and dry       Scheduled Meds:     atorvastatin, 80 mg, Oral, Nightly  bisoprolol, 5 mg, Oral, Daily  DULoxetine, 30 mg, Oral, Daily  [START ON 11/7/2024] ferrous sulfate, 325 mg, Oral, Every Other Day  fluticasone, 2 spray, Nasal, Daily  furosemide, 80 mg, Intravenous, Q12H  hydrALAZINE, 50 mg, Oral, Q8H  insulin glargine, 90 Units, Subcutaneous, Nightly  insulin lispro, 15 Units, Subcutaneous, TID AC  insulin lispro, 2-7 Units, Subcutaneous, 4x Daily AC & at Bedtime  levothyroxine, 137 mcg, Oral, Q AM  pantoprazole, 40 mg, Oral, Q AM  pregabalin, 75 mg, Oral, BID  terazosin, 1 mg, Oral, Nightly      IV Meds:        Results Reviewed:   I  have personally reviewed the results from the time of this admission to 11/6/2024 10:29 EST     Lab Results   Component Value Date    GLUCOSE 201 (H) 11/06/2024    CALCIUM 8.4 (L) 11/06/2024     11/06/2024    K 3.8 11/06/2024    CO2 17.2 (L) 11/06/2024     (H) 11/06/2024    BUN 36 (H) 11/06/2024    CREATININE 2.07 (H) 11/06/2024    EGFRIFAFRI 50 (L) 01/18/2021    EGFRIFNONA 51 (L) 02/28/2022    BCR 17.4 11/06/2024    ANIONGAP 13.8 11/06/2024      Lab Results   Component Value Date    MG 1.9 11/05/2024    PHOS 4.6 (H) 10/19/2024    ALBUMIN 3.4 (L) 11/05/2024           Assessment / Plan       CHF exacerbation    Acute exacerbation of CHF (congestive heart failure)      ASSESSMENT:  CKD stage IV, creatinine peaked to 3.3 due to VIPUL during recent hospitalization, recovered back down to 2.17 upon discharge, now with new baseline around 2.0-2.2.  The patient has volume excess  Normal anion gap metabolic acidosis, secondary to CKD, CO2 17.2  Acute exacerbation of CHF, Bumex was discontinued during last hospitalization.  Echo on 8/26/2024 showed EF 61% with normal LVF but mild mitral valve regurg  Anemia with CKD, on iron supplements every other day, Hgb low at 7.3  Type II DM with CKD, poorly controlled, A1c 10.3 on 10/12/2024, managed by primary team  Hypertension with CKD, BP elevated, in association with fluid excess, home BP regimen resumed  UTI, management per primary team  Recent bilateral lower extremity cellulitis      PLAN:  Switch IV Lasix to IV Bumex 4 mg every 8 hours x 3 doses   Monitor for diuretic toxicity  check random urine for sodium, chloride, protein to creatinine ratio  Check uric acid as a marker of effective intravascular volume  Check bladder scan and orthostatic blood pressure for completeness  Surveillance labs    I reviewed the chart and other providers notes and I reviewed labs.  I discussed the case with the patient and she voiced good understanding.    Thank you for involving us in  the care of Halie Guidry.  Please feel free to call with any questions.    Chidi Lanier MD  11/06/24  10:29 [facility]    [facility]  [phone]      Please note that portions of this note were completed with a voice recognition program.

## 2024-11-06 NOTE — THERAPY EVALUATION
Patient Name: Halie Guidry  : 1940    MRN: [id number]                              Today's Date: 2024       Admit Date: 2024    Visit Dx:     ICD-10-CM ICD-9-CM   1. Acute on chronic congestive heart failure, unspecified heart failure type  I50.9 428.0   2. Bilateral leg edema  R60.0 782.3     Patient Active Problem List   Diagnosis    Compression fracture    Lumbar degenerative disc disease    Type 2 diabetes mellitus with hyperglycemia, with long-term current use of insulin    Hyperlipidemia    Benign essential hypertension    Primary hypothyroidism    Neuropathy    Osteoporosis    Proteinuria    Tobacco abuse    Generalized weakness    Spinal stenosis    Scoliosis    Arthritis    VBI (vertebrobasilar insufficiency)    Orthostatic hypotension    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    Severe hypothyroidism    Noncompliance with medication regimen    Vitamin D deficiency disease    Tremor    Seizure    Thoracic degenerative disc disease    Acute kidney injury (VIPUL) with acute tubular necrosis (ATN)    Hyperglycemia    Type II diabetes mellitus, uncontrolled    Lower abdominal pain    Transaminitis    Emphysematous cystitis    Choledocholithiasis    Hypokalemia    Hypoxia    Generalized abdominal pain    History of Clostridium difficile infection    History of ERCP    Hypomagnesemia    Anxiety disorder    Neuropathic pain    Intertrigo    Weakness of both lower extremities    Accelerated hypertension    Acute cystitis without hematuria    Left lower lobe pneumonia    Cellulitis and abscess of left lower extremity    Benign hypertension with CKD (chronic kidney disease) stage IV    Peripheral edema    Primary malignant neuroendocrine tumor of pancreas    Encounter for long-term (current) use of high-risk medication    Diabetic foot ulcer    Stage 3a chronic kidney disease    Pressure injury of buttock, stage 2    HTN (hypertension)    Anemia due to chronic kidney disease     Bilateral lower extremity edema    Cellulitis of right lower extremity    CKD (chronic kidney disease) stage 4, GFR 15-29 ml/min    Acute CHF    Bilateral cellulitis of lower leg    Acute UTI    UTI (urinary tract infection)    Acute UTI (urinary tract infection)    Metabolic acidosis    Cobalamin deficiency    Dependent edema    Gastroesophageal reflux disease    Mild neurocognitive disorder    Pancreatic neoplasm    Congestive heart failure    CHF exacerbation    Acute exacerbation of CHF (congestive heart failure)     Past Medical History:   Diagnosis Date    Acute metabolic encephalopathy 06/24/2022    Anxiety     Arthritis     Benign essential hypertension 08/20/2014    Bleeding disorder     Depression     Diabetes     Diabetes mellitus, type 2     Disc degeneration, lumbar     Headache, tension-type     Hyperlipidemia     Hypothyroidism     Neuropathy     Osteoporosis 09/09/2015    Pancreatic mass     Sept 2023, on Monthly Octreotide Injection    Peripheral neuropathy     Rotator cuff tear, left     Scoliosis     Shoulder pain     LEFT, TORN ROTATOR CUFF S/P FALL    Spinal stenosis      Past Surgical History:   Procedure Laterality Date    APPENDECTOMY      BILATERAL BREAST REDUCTION Bilateral 08/2015    CATARACT EXTRACTION  03/2015    CHOLECYSTECTOMY WITH INTRAOPERATIVE CHOLANGIOGRAM N/A 3/27/2022    Procedure: CHOLECYSTECTOMY LAPAROSCOPIC INTRAOPERATIVE CHOLANGIOGRAM;  Surgeon: Aiyana Hill MD;  Location: [facility] MAIN OR;  Service: General;  Laterality: N/A;    COLONOSCOPY  06/05/2015    WNL    ERCP N/A 2/25/2022    Procedure: ENDOSCOPIC RETROGRADE CHOLANGIOPANCREATOGRAPHY with sphincterotomy and balloon sweep;  Surgeon: Chilo Wilhelm MD;  Location: [facility] ENDOSCOPY;  Service: Gastroenterology;  Laterality: N/A;  PRE/POST - CBD stones    ERCP N/A 3/28/2022    Procedure: ENDOSCOPIC RETROGRADE CHOLANGIOPANCREATOGRAPHY WITH SPHINCTEROTOMY AND BALLOON SWEEP;  Surgeon: Chilo Wilhelm MD;   Location: [facility] ENDOSCOPY;  Service: Gastroenterology;  Laterality: N/A;  PRE: COMMON DUCT STONE  POST: COMMON DUCT STONE    KYPHOPLASTY      REDUCTION MAMMAPLASTY      TONSILLECTOMY        General Information       Row Name 11/06/24 1035          Physical Therapy Time and Intention    Document Type evaluation  -MS     Mode of Treatment physical therapy  -MS       Row Name 11/06/24 1035          General Information    Patient Profile Reviewed yes  -MS     Prior Level of Function independent:;bed mobility;transfer;w/c or scooter  nonambulatory, self-sufficient, some assist with ADLs by staff  -MS     Existing Precautions/Restrictions fall  -MS     Barriers to Rehab medically complex  -MS       Row Name 11/06/24 1035          Living Environment    People in Home facility resident  [facility]  -MS       Row Name 11/06/24 1035          Home Main Entrance    Number of Stairs, Main Entrance none  -MS       Row Name 11/06/24 1035          Stairs Within Home, Primary    Number of Stairs, Within Home, Primary none  -MS       Row Name 11/06/24 1035          Cognition    Orientation Status (Cognition) oriented x 4  -MS       Row Name 11/06/24 1035          Safety Issues/Impairments Affecting Functional Mobility    Safety Issues Affecting Function (Mobility) insight into deficits/self-awareness;judgment;positioning of assistive device  -MS     Impairments Affecting Function (Mobility) strength;endurance/activity tolerance;balance  -MS     Comment, Safety Issues/Impairments (Mobility) Gait belt and non skid socks donned.  -MS               User Key  (r) = Recorded By, (t) = Taken By, (c) = Cosigned By      Initials Name Provider Type    MS Veronica Chen PT Physical Therapist                   Mobility       Row Name 11/06/24 1037          Bed Mobility    Bed Mobility supine-sit;sit-supine  -MS     Supine-Sit [place] (Bed Mobility) standby assist;verbal cues  -MS     Sit-Supine [place] (Bed Mobility)  standby assist;verbal cues  -MS     Assistive Device (Bed Mobility) bed rails;head of bed elevated  -MS       Row Name 11/06/24 1037          Transfers    Comment, (Transfers) 2 STS attempts- difficulty achieving upright. Weakness limiting.  -MS       Row Name 11/06/24 1037          Sit-Stand Transfer    Sit-Stand Jackson (Transfers) maximum assist (25% patient effort);2 person assist;verbal cues;nonverbal cues (demo/gesture)  -MS     Assistive Device (Sit-Stand Transfers) walker, front-wheeled  -MS               User Key  (r) = Recorded By, (t) = Taken By, (c) = Cosigned By      Initials Name Provider Type    Veronica Valdovinos, PT Physical Therapist                   Obj/Interventions       Row Name 11/06/24 1037          Range of Motion Comprehensive    Comment, General Range of Motion B LEs grossly 75% WFL  -MS       Row Name 11/06/24 1037          Strength Comprehensive (MMT)    Comment, General Manual Muscle Testing (MMT) Assessment B LEs grossly 3-/5, generalized weakness noted.  -MS       Row Name 11/06/24 1039          Motor Skills    Therapeutic Exercise --  Seated LAQs x 5 reps  -MS       Row Name 11/06/24 1039 11/06/24 1037       Balance    Balance Assessment -- sitting static balance;standing static balance  -MS    Static Sitting Balance standby assist  -MS standby assist  -MS    Position, Sitting Balance sitting edge of bed  -MS sitting edge of bed  -MS    Static Standing Balance maximum assist  -MS maximum assist  -MS    Position/Device Used, Standing Balance supported  -MS supported  -MS              User Key  (r) = Recorded By, (t) = Taken By, (c) = Cosigned By      Initials Name Provider Type    Veronica Valdovinos, PT Physical Therapist                   Goals/Plan       Row Name 11/06/24 1040          Bed Mobility Goal 1 (PT)    Activity/Assistive Device (Bed Mobility Goal 1, PT) bed mobility activities, all  -MS     Jackson Level/Cues Needed (Bed Mobility Goal 1, PT) supervision  required  -MS     Time Frame (Bed Mobility Goal 1, PT) 1 week  -MS       Row Name 11/06/24 1040          Transfer Goal 1 (PT)    Activity/Assistive Device (Transfer Goal 1, PT) transfers, all  -MS     [place] Level/Cues Needed (Transfer Goal 1, PT) supervision required  -MS     Time Frame (Transfer Goal 1, PT) 1 week  -MS       Row Name 11/06/24 1040          Therapy Assessment/Plan (PT)    Planned Therapy Interventions (PT) balance training;bed mobility training;gait training;home exercise program;postural re-education;patient/family education;ROM (range of motion);stair training;strengthening;transfer training  -MS               User Key  (r) = Recorded By, (t) = Taken By, (c) = Cosigned By      Initials Name Provider Type    Veronica Valdovinos, PT Physical Therapist                   Clinical Impression       Row Name 11/06/24 1040          Pain    Pretreatment Pain Rating 0/10 - no pain  -MS     Posttreatment Pain Rating 0/10 - no pain  -MS       Row Name 11/06/24 1040          Therapy Assessment/Plan (PT)    Rehab Potential (PT) fair  -MS     Criteria for Skilled Interventions Met (PT) yes;meets criteria  -MS     Therapy Frequency (PT) 5 times/wk  -MS       Row Name 11/06/24 1040          Vital Signs    O2 Delivery Pre Treatment room air  -MS       Row Name 11/06/24 1040          Positioning and Restraints    Pre-Treatment Position in bed  -MS     Post Treatment Position bed  -MS     In Bed notified nsg;fowlers;call light within reach;encouraged to call for assist;exit alarm on;with family/caregiver  -MS               User Key  (r) = Recorded By, (t) = Taken By, (c) = Cosigned By      Initials Name Provider Type    Veronica Valdovinos, PT Physical Therapist                   Outcome Measures       Row Name 11/06/24 1040          How much help from another person do you currently need...    Turning from your back to your side while in flat bed without using bedrails? 3  -MS     Moving from lying on  back to sitting on the side of a flat bed without bedrails? 2  -MS     Moving to and from a bed to a chair (including a wheelchair)? 1  -MS     Standing up from a chair using your arms (e.g., wheelchair, bedside chair)? 1  -MS     Climbing 3-5 steps with a railing? 1  -MS     To walk in hospital room? 1  -MS     AM-PAC 6 Clicks Score (PT) 9  -MS               User Key  (r) = Recorded By, (t) = Taken By, (c) = Cosigned By      Initials Name Provider Type    MS Veronica Chen PT Physical Therapist                                 Physical Therapy Education       Title: PT OT SLP Therapies (In Progress)       Topic: Physical Therapy (In Progress)       Point: Mobility training (Done)       Learning Progress Summary            Patient Acceptance, E,TB, NR,VU by MS at 11/6/2024 1041                      Point: Home exercise program (Not Started)       Learner Progress:  Not documented in this visit.              Point: Body mechanics (Done)       Learning Progress Summary            Patient Acceptance, E,TB, NR,VU by MS at 11/6/2024 1041                      Point: Precautions (Not Started)       Learner Progress:  Not documented in this visit.                              User Key       Initials Effective Dates Name Provider Type Discipline    MS 06/16/21 -  Veronica Chen PT Physical Therapist PT                  PT Recommendation and Plan  Planned Therapy Interventions (PT): balance training, bed mobility training, gait training, home exercise program, postural re-education, patient/family education, ROM (range of motion), stair training, strengthening, transfer training        Time Calculation:         PT Charges       Row Name 11/06/24 1034             Time Calculation    Start Time 0835  -MS      Stop Time 0859  -MS      Time Calculation (min) 24 min  -MS      PT Received On 11/06/24  -MS      PT - Next Appointment 11/07/24  -MS      PT Goal Re-Cert Due Date 11/13/24  -MS                User Key  (r) =  Recorded By, (t) = Taken By, (c) = Cosigned By      Initials Name Provider Type    MS Veronica Chen, PT Physical Therapist                  Therapy Charges for Today       Code Description Service Date Service Provider Modifiers Qty    58279444529 HC PT EVAL MOD COMPLEXITY 3 11/6/2024 Veronica Chen, PT GP 1    59025414750 HC PT THER PROC EA 15 MIN 11/6/2024 Veronica Chen, PT GP 1            PT G-Codes  AM-PAC 6 Clicks Score (PT): 9  PT Discharge Summary  Anticipated Discharge Disposition (PT): assisted living, skilled nursing facility (pending patient progress made)    Veronica Chen, MELODIE  11/6/2024

## 2024-11-06 NOTE — DISCHARGE PLACEMENT REQUEST
"Tabatha Guidry (84 y.o. Female)       Date of Birth   1940    Social Security Number       Address   [address]  [place] [id number]    Home Phone   [phone]    MRN   [id number]       Anabaptist   Episcopal    Marital Status                               Admission Date   11/5/24    Admission Type   Emergency    Admitting Provider   Kim Barnard MD    Attending Provider   Jazlyn Maynard MD    Department, Room/Bed   [address], [address]1       Discharge Date       Discharge Disposition       Discharge Destination                                 Attending Provider: Jazlyn Maynard MD    Allergies: Sulfa Antibiotics    Isolation: Contact   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), Candida Auris (rule out) (11/05/24)   Code Status: No CPR    Ht: 165.1 cm (65\")   Wt: 98.9 kg (218 lb 0.6 oz)    Admission Cmt: None   Principal Problem: CHF exacerbation [I50.9]                   Active Insurance as of 11/5/2024       Primary Coverage       Payor Plan Insurance Group Employer/Plan Group    [facility] MEDICARE REPLACEMENT [facility] MED ADV GROUP [id number]       Payor Plan Address Payor Plan Phone Number Payor Plan Fax Number Effective Dates    [address] [address]   1/1/2023 - None Entered    [facility] [id number]         Subscriber Name Subscriber Birth Date Member ID       TABATHA GUIDRY 1940 [id number]                     Emergency Contacts        (Rel.) Home Phone Work Phone Mobile Phone    Derrick Guidry (HCS) (Son) [phone] -- [phone]    Josse Guidry (HCS) (Son) -- -- [phone]    JAYME GUIDRY (Grandchild) -- -- [phone]                "

## 2024-11-06 NOTE — PLAN OF CARE
Goal Outcome Evaluation:      Patient is a 84 y.o. female admitted to [facility] on 11/5/2024 for leg swelling, SOA and CHF exacerbation. Patient is nonambulatory at baseline and lives in [facility]. Patient is primarily wc bound and able to complete transfers independently at baseline. Today, patient performed bed mobility with SBA and required maxAx2 for two STS transfers- weakness and swelling in legs limiting. Patient may benefit from skilled PT services acutely to address functional deficits as well as improve level of independence prior to discharge. Anticipate returning back to [facility] but may need SNF upon DC.    Anticipated Discharge Disposition (PT): assisted living, skilled nursing facility (pending patient progress made)

## 2024-11-07 LAB
ALBUMIN SERPL-MCNC: 2.9 G/DL (ref 3.5–5.2)
ANION GAP SERPL CALCULATED.3IONS-SCNC: 9.2 MMOL/L (ref 5–15)
BASOPHILS # BLD AUTO: 0.09 10*3/MM3 (ref 0–0.2)
BASOPHILS NFR BLD AUTO: 1.3 % (ref 0–1.5)
BH BB BLOOD EXPIRATION DATE: NORMAL
BH BB BLOOD TYPE BARCODE: 5100
BH BB DISPENSE STATUS: NORMAL
BH BB PRODUCT CODE: NORMAL
BH BB UNIT NUMBER: NORMAL
BUN SERPL-MCNC: 35 MG/DL (ref 8–23)
BUN/CREAT SERPL: 15.6 (ref 7–25)
C AURIS DNA SPEC QL NAA+NON-PROBE: NOT DETECTED
CALCIUM SPEC-SCNC: 8.2 MG/DL (ref 8.6–10.5)
CHLORIDE SERPL-SCNC: 111 MMOL/L (ref 98–107)
CO2 SERPL-SCNC: 20.8 MMOL/L (ref 22–29)
CREAT SERPL-MCNC: 2.24 MG/DL (ref 0.57–1)
CROSSMATCH INTERPRETATION: NORMAL
DEPRECATED RDW RBC AUTO: 47.9 FL (ref 37–54)
EGFRCR SERPLBLD CKD-EPI 2021: 21.1 ML/MIN/1.73
EOSINOPHIL # BLD AUTO: 0.53 10*3/MM3 (ref 0–0.4)
EOSINOPHIL NFR BLD AUTO: 7.6 % (ref 0.3–6.2)
ERYTHROCYTE [DISTWIDTH] IN BLOOD BY AUTOMATED COUNT: 15.2 % (ref 12.3–15.4)
GLUCOSE BLDC GLUCOMTR-MCNC: 116 MG/DL (ref 70–130)
GLUCOSE BLDC GLUCOMTR-MCNC: 120 MG/DL (ref 70–130)
GLUCOSE BLDC GLUCOMTR-MCNC: 133 MG/DL (ref 70–130)
GLUCOSE BLDC GLUCOMTR-MCNC: 261 MG/DL (ref 70–130)
GLUCOSE SERPL-MCNC: 93 MG/DL (ref 65–99)
HCT VFR BLD AUTO: 27.2 % (ref 34–46.6)
HGB BLD-MCNC: 8.8 G/DL (ref 12–15.9)
IMM GRANULOCYTES # BLD AUTO: 0.03 10*3/MM3 (ref 0–0.05)
IMM GRANULOCYTES NFR BLD AUTO: 0.4 % (ref 0–0.5)
LYMPHOCYTES # BLD AUTO: 0.82 10*3/MM3 (ref 0.7–3.1)
LYMPHOCYTES NFR BLD AUTO: 11.7 % (ref 19.6–45.3)
MCH RBC QN AUTO: 28 PG (ref 26.6–33)
MCHC RBC AUTO-ENTMCNC: 32.4 G/DL (ref 31.5–35.7)
MCV RBC AUTO: 86.6 FL (ref 79–97)
MONOCYTES # BLD AUTO: 0.72 10*3/MM3 (ref 0.1–0.9)
MONOCYTES NFR BLD AUTO: 10.3 % (ref 5–12)
NEUTROPHILS NFR BLD AUTO: 4.79 10*3/MM3 (ref 1.7–7)
NEUTROPHILS NFR BLD AUTO: 68.7 % (ref 42.7–76)
NRBC BLD AUTO-RTO: 0 /100 WBC (ref 0–0.2)
PHOSPHATE SERPL-MCNC: 5 MG/DL (ref 2.5–4.5)
PLATELET # BLD AUTO: 316 10*3/MM3 (ref 140–450)
PMV BLD AUTO: 9.6 FL (ref 6–12)
POTASSIUM SERPL-SCNC: 4 MMOL/L (ref 3.5–5.2)
RBC # BLD AUTO: 3.14 10*6/MM3 (ref 3.77–5.28)
SODIUM SERPL-SCNC: 141 MMOL/L (ref 136–145)
UNIT  ABO: NORMAL
UNIT  RH: NORMAL
URATE SERPL-MCNC: 10 MG/DL (ref 2.4–5.7)
WBC NRBC COR # BLD AUTO: 6.98 10*3/MM3 (ref 3.4–10.8)

## 2024-11-07 PROCEDURE — 82948 REAGENT STRIP/BLOOD GLUCOSE: CPT

## 2024-11-07 PROCEDURE — 63710000001 INSULIN LISPRO (HUMAN) PER 5 UNITS: Performed by: INTERNAL MEDICINE

## 2024-11-07 PROCEDURE — 80069 RENAL FUNCTION PANEL: CPT | Performed by: INTERNAL MEDICINE

## 2024-11-07 PROCEDURE — 84550 ASSAY OF BLOOD/URIC ACID: CPT | Performed by: INTERNAL MEDICINE

## 2024-11-07 PROCEDURE — 97530 THERAPEUTIC ACTIVITIES: CPT

## 2024-11-07 PROCEDURE — 63710000001 INSULIN GLARGINE PER 5 UNITS: Performed by: INTERNAL MEDICINE

## 2024-11-07 PROCEDURE — 85025 COMPLETE CBC W/AUTO DIFF WBC: CPT | Performed by: INTERNAL MEDICINE

## 2024-11-07 PROCEDURE — 97110 THERAPEUTIC EXERCISES: CPT

## 2024-11-07 RX ORDER — TORSEMIDE 20 MG/1
40 TABLET ORAL
Status: DISCONTINUED | OUTPATIENT
Start: 2024-11-07 | End: 2024-11-11 | Stop reason: HOSPADM

## 2024-11-07 RX ADMIN — INSULIN LISPRO 4 UNITS: 100 INJECTION, SOLUTION INTRAVENOUS; SUBCUTANEOUS at 12:14

## 2024-11-07 RX ADMIN — PANTOPRAZOLE SODIUM 40 MG: 40 TABLET, DELAYED RELEASE ORAL at 05:50

## 2024-11-07 RX ADMIN — PREGABALIN 75 MG: 75 CAPSULE ORAL at 08:45

## 2024-11-07 RX ADMIN — HYDRALAZINE HYDROCHLORIDE 50 MG: 50 TABLET ORAL at 14:11

## 2024-11-07 RX ADMIN — HYDRALAZINE HYDROCHLORIDE 50 MG: 50 TABLET ORAL at 21:23

## 2024-11-07 RX ADMIN — Medication 2.5 MG: at 21:21

## 2024-11-07 RX ADMIN — INSULIN LISPRO 15 UNITS: 100 INJECTION, SOLUTION INTRAVENOUS; SUBCUTANEOUS at 12:14

## 2024-11-07 RX ADMIN — INSULIN GLARGINE 90 UNITS: 100 INJECTION, SOLUTION SUBCUTANEOUS at 21:22

## 2024-11-07 RX ADMIN — TORSEMIDE 40 MG: 20 TABLET ORAL at 14:11

## 2024-11-07 RX ADMIN — ACETAMINOPHEN 325MG 650 MG: 325 TABLET ORAL at 05:50

## 2024-11-07 RX ADMIN — INSULIN LISPRO 15 UNITS: 100 INJECTION, SOLUTION INTRAVENOUS; SUBCUTANEOUS at 16:51

## 2024-11-07 RX ADMIN — TORSEMIDE 40 MG: 20 TABLET ORAL at 21:21

## 2024-11-07 RX ADMIN — TERAZOSIN 1 MG: 1 CAPSULE ORAL at 21:23

## 2024-11-07 RX ADMIN — ATORVASTATIN CALCIUM 80 MG: 80 TABLET, FILM COATED ORAL at 21:21

## 2024-11-07 RX ADMIN — HYDRALAZINE HYDROCHLORIDE 50 MG: 50 TABLET ORAL at 05:50

## 2024-11-07 RX ADMIN — INSULIN LISPRO 15 UNITS: 100 INJECTION, SOLUTION INTRAVENOUS; SUBCUTANEOUS at 08:45

## 2024-11-07 RX ADMIN — LEVOTHYROXINE SODIUM 137 MCG: 137 TABLET ORAL at 05:50

## 2024-11-07 RX ADMIN — PREGABALIN 75 MG: 75 CAPSULE ORAL at 21:21

## 2024-11-07 RX ADMIN — BISOPROLOL FUMARATE 5 MG: 5 TABLET, FILM COATED ORAL at 08:45

## 2024-11-07 RX ADMIN — DULOXETINE HYDROCHLORIDE 30 MG: 30 CAPSULE, DELAYED RELEASE ORAL at 08:45

## 2024-11-07 RX ADMIN — FERROUS SULFATE TAB 325 MG (65 MG ELEMENTAL FE) 325 MG: 325 (65 FE) TAB at 08:45

## 2024-11-07 NOTE — PLAN OF CARE
Goal Outcome Evaluation:      Pt AOx4, RA, BP elevated but stable. No acute changes overnight. 1 unit PRBC's completed- HgB this AM 8.8. Ace bandage around LLE removed due to pt c/o pain and being too tight- pain decreased with removal. Purewick in place. Bed alarm on. Call light within reach. Makes needs known. Plan of care ongoing.

## 2024-11-07 NOTE — PLAN OF CARE
Goal Outcome Evaluation:  Plan of Care Reviewed With: patient        Progress: no change  Outcome Evaluation: pt seen for OT/PT tx session this date to maximize therapeutic benefit. She continues to present below her baseline with ADL related transfers. Mod a x2 for STS, pain in BLE limiting at times and unable to progress with lateral steps or steps to chair/BSC. She demo'd poor balance with static standing on second attempt. Continue to progress as able, d/c rec education provided to pt, continue to rec SNF, however pt reports she will not go to SNF    Anticipated Discharge Disposition (OT): skilled nursing facility

## 2024-11-07 NOTE — CASE MANAGEMENT/SOCIAL WORK
Discharge Planning Assessment  [facility]     Patient Name: Halie Guidry  MRN: [id number]  Today's Date: 11/7/2024    Admit Date: 11/5/2024    Plan: TBD   Discharge Needs Assessment       Row Name 11/07/24 1313       Living Environment    People in Home facility resident    Current Living Arrangements assisted living facility    Potentially Unsafe Housing Conditions none    In the past 12 months has the electric, gas, oil, or water company threatened to shut off services in your home? No    Primary Care Provided by other (see comments)    Provides Primary Care For no one, unable/limited ability to care for self    Family Caregiver if Needed child(beatriz), adult    Family Caregiver Names Derrick/ [phone]    Quality of Family Relationships involved       Resource/Environmental Concerns    Resource/Environmental Concerns none    Transportation Concerns none       Transportation Needs    In the past 12 months, has lack of transportation kept you from medical appointments or from getting medications? no  Son transports to appointments    In the past 12 months, has lack of transportation kept you from meetings, work, or from getting things needed for daily living? No       Food Insecurity    Within the past 12 months, you worried that your food would run out before you got the money to buy more. Never true    Within the past 12 months, the food you bought just didn't last and you didn't have money to get more. Never true       Transition Planning    Patient/Family Anticipates Transition to long-term care facility    Patient/Family Anticipated Services at Transition ;home health care;rehabilitation services;skilled nursing    Transportation Anticipated health plan transportation       Discharge Needs Assessment    Readmission Within the Last 30 Days current reason for admission unrelated to previous admission    Equipment Currently Used at Home wheelchair;rollator    Do you want help finding or  keeping work or a job? I do not need or want help    Do you want help with school or training? For example, starting or completing job training or getting a high school diploma, GED or equivalent No    Anticipated Changes Related to Illness none    Provided Post Acute Provider List? Yes    Post Acute Provider List Nursing Home    Provided Post Acute Provider Quality & Resource List? Yes    Post Acute Provider Quality and Resource List Nursing Home    Delivered To Patient    Method of Delivery In person    Offered/Gave Vendor List yes                   Discharge Plan       Row Name 11/07/24 1322       Plan    Plan TBD    Patient/Family in Agreement with Plan yes    Plan Comments Spoke with patient's son via outbound call. Introduced self and explained CCP role. Verified face sheet and patient lives in assisted living at [facility]. Family states that patient is w/c dependent at baseline. Explained that therapy is recommending SNF at discharge for strengthening, son states that patient does not typically benefit from rehab. Son request that patient return to [place]. Spoke with [facility], states that patient will need to be an assist of 1 to return. Spoke with patient at bedside. Introduced self. Patient states that she does not want to go to SNF, explained therapy recommending rehab. Patient states that she would like to return to [facility] and denied need for SNF. Attempted to contact [facility] to discuss discharge plan.                  Continued Care and Services - Admitted Since 11/5/2024       Home Medical Care       Service Provider Request Status Services Address Phone Fax Patient Preferred    [facility] [address], [address], [facility] [address] [phone] --                  Selected Continued Care - Episodes Includes continued care and service providers with selected services from the active episodes listed below      Lite Endocrine  Disorders Episode start date: 9/10/2024   There are no active outsourced providers for this episode.             Chronic Care Management Episode start date: 7/31/2024 (Paused)   There are no active outsourced providers for this episode.                 Selected Continued Care - Prior Encounters Includes continued care and service providers with selected services from prior encounters from 8/7/2024 to 11/7/2024      Discharged on 10/19/2024 Admission date: 10/10/2024 - Discharge disposition: Home or Self Care      Destination       Service Provider Services Address Phone Fax Patient Preferred    [facility] [address] [facility] [address] [phone] --              Home Medical Care       Service Provider Services Address Phone Fax Patient Preferred    [facility] [address], [address] [place] [address] [phone]        Internal Comment last updated by Alon Hernandez RN 10/11/2024 1015    Patient is current with HEENA Fermin RN                                 Discharged on 9/4/2024 Admission date: 8/24/2024 - Discharge disposition: Skilled Nursing Facility (DC - External)      Destination       Service Provider Services Address Phone Fax Patient Preferred    [facility] [address] [facility] [address] [phone] --                          Expected Discharge Date and Time       Expected Discharge Date Expected Discharge Time    Nov 8, 2024            Demographic Summary       Row Name 11/07/24 1311       General Information    Admission Type inpatient    Required Notices Provided Important Message from Medicare    Referral Source admission list    Reason for Consult discharge planning    Preferred Language English                   Functional Status       Row Name 11/07/24 1311       Functional Status    Usual Activity  Tolerance fair    Current Activity Tolerance poor       Physical Activity    On average, how many days per week do you engage in moderate to strenuous exercise (like a brisk walk)? 0 days    On average, how many minutes do you engage in exercise at this level? 0 min    Number of minutes of exercise per week 0       Functional Status, IADL    Medications assistive person    Meal Preparation completely dependent    Housekeeping completely dependent    Laundry completely dependent    Shopping completely dependent    If for any reason you need help with day-to-day activities such as bathing, preparing meals, shopping, managing finances, etc., do you get the help you need? I could use a little more help       Mental Status    General Appearance WDL WDL       Mental Status Summary    Recent Changes in Mental Status/Cognitive Functioning no changes       Employment/    Employment Status retired                   Psychosocial       Row Name 11/07/24 1312       Values/Beliefs    Spiritual, Cultural Beliefs, Mormonism Practices, Values that Affect Care no       Mental Health    Little interest or pleasure in doing things Several days    Feeling down, depressed, or hopeless Several days       Stress    Do you feel stress - tense, restless, nervous, or anxious, or unable to sleep at night because your mind is troubled all the time - these days? Only a littl       Developmental Stage (Eriksson's Stages of Development)    Developmental Stage Stage 8 (65 years-death/Late Adulthood) Integrity vs. Despair                   Abuse/Neglect    No documentation.                  Legal       Row Name 11/07/24 1312       Financial Resource Strain    How hard is it for you to pay for the very basics like food, housing, medical care, and heating? Not very                   Substance Abuse    No documentation.                  Patient Forms    No documentation.                     Maribeth Khan RN

## 2024-11-07 NOTE — PROGRESS NOTES
Name: Halie Guidry ADMIT: 2024   : 1940  PCP: Napoleon Mead MD    MRN: [id number] LOS: 2 days   AGE/SEX: 84 y.o. female  ROOM: [facility] (formerly [facility])     Subjective   Subjective   Patient is seen at bedside, she slept well overnight, does not report any acute issues to me at this point of time.       Objective   Objective   Vital Signs  Temp:  [97.3 °F (36.3 °C)-97.7 °F (36.5 °C)] 97.4 °F (36.3 °C)  Heart Rate:  [59-69] 62  Resp:  [18] 18  BP: (152-190)/(50-77) 166/77  SpO2:  [93 %-98 %] 93 %  on  Flow (L/min) (Oxygen Therapy):  [1] 1;   Device (Oxygen Therapy): room air  Body mass index is 35 kg/m².  Physical Exam  General Appearance:    Alert, cooperative, no distress, appears stated age.  Head:    Normocephalic, without obvious abnormality, atraumatic  Eyes:    PERRL, conjunctiva/corneas clear, EOM's intact, both eyes  Ears:    Normal external ear canals, both ears  Nose:   Nares normal, septum midline, mucosa normal, no drainage    or sinus tenderness  Throat:   Lips, tongue, gums normal; oral mucosa pink and moist  Neck:   Supple, symmetrical, trachea midline, no adenopathy;     thyroid:  no enlargement/tenderness/nodules; no carotid    bruit or JVD  Back:     Symmetric, no curvature, ROM normal, no CVA tenderness  Lungs:     Clear to auscultation bilaterally, respirations unlabored  Chest Wall:    No tenderness or deformity   Heart:    Regular rate and rhythm, S1 and S2 normal, no murmur, rub   or gallop  Abdomen:    Soft nontender no organomegaly detected  Extremities:   Extremities normal, atraumatic, no cyanosis or edema  Pulses:   Pulses palpable in all extremities; symmetric all extremities  Skin:   Skin color normal, Skin is warm and dry,  no rashes or palpable lesions  Neurologic:   CNII-XII intact, motor strength grossly intact, sensation grossly intact to light touch, no focal deficits noted           Results Review     I reviewed the patient's new clinical results.  Results from last 7 days    Lab Units 11/07/24  0354 11/06/24  0324 11/05/24  1741   WBC 10*3/mm3 6.98 9.84 12.33*   HEMOGLOBIN g/dL 8.8* 7.3* 8.4*   PLATELETS 10*3/mm3 316 307 300     Results from last 7 days   Lab Units 11/07/24  0354 11/06/24  0324 11/05/24  1813   SODIUM mmol/L 141 142 141   POTASSIUM mmol/L 4.0 3.8 4.4   CHLORIDE mmol/L 111* 111* 111*   CO2 mmol/L 20.8* 17.2* 18.4*   BUN mg/dL 35* 36* 35*   CREATININE mg/dL 2.24* 2.07* 2.11*   GLUCOSE mg/dL 93 201* 154*   EGFR mL/min/1.73 21.1* 23.3* 22.7*     Results from last 7 days   Lab Units 11/07/24  0354 11/05/24  1813   ALBUMIN g/dL 2.9* 3.4*   BILIRUBIN mg/dL  --  0.4   ALK PHOS U/L  --  111   AST (SGOT) U/L  --  8   ALT (SGPT) U/L  --  5     Results from last 7 days   Lab Units 11/07/24  0354 11/06/24  1602 11/06/24 0324 11/05/24  1813   CALCIUM mg/dL 8.2*  --  8.4* 8.5*   ALBUMIN g/dL 2.9*  --   --  3.4*   MAGNESIUM mg/dL  --  1.7  --  1.9   PHOSPHORUS mg/dL 5.0*  --   --   --        Hemoglobin A1C   Date/Time Value Ref Range Status   11/06/2024 1602 9.10 (H) 4.80 - 5.60 % Final     Glucose   Date/Time Value Ref Range Status   11/07/2024 1115 261 (H) 70 - 130 mg/dL Final   11/07/2024 0609 120 70 - 130 mg/dL Final   11/06/2024 2027 289 (H) 70 - 130 mg/dL Final   11/06/2024 1558 105 70 - 130 mg/dL Final   11/06/2024 1053 239 (H) 70 - 130 mg/dL Final   11/06/2024 0557 171 (H) 70 - 130 mg/dL Final   11/05/2024 2318 143 (H) 70 - 130 mg/dL Final       XR Chest 1 View    Result Date: 11/5/2024  Low lung volumes. Lingular linear atelectasis/scarring. No new focal consolidation. No pleural effusion or pneumothorax. Normal size cardiomediastinal silhouette. Central pulmonary vasculature is accentuated by low lung volumes but remains distinct/nondilated. Few healed right-sided rib fractures.  This report was finalized on 11/5/2024 5:28 PM by Dr. Lewis Viramontes M.D on Workstation: [id number]       I have personally reviewed all medications:  Scheduled Medications  atorvastatin, 80  mg, Oral, Nightly  bisoprolol, 5 mg, Oral, Daily  DULoxetine, 30 mg, Oral, Daily  ferrous sulfate, 325 mg, Oral, Every Other Day  fluticasone, 2 spray, Nasal, Daily  hydrALAZINE, 50 mg, Oral, Q8H  insulin glargine, 90 Units, Subcutaneous, Nightly  insulin lispro, 15 Units, Subcutaneous, TID AC  insulin lispro, 2-7 Units, Subcutaneous, 4x Daily AC & at Bedtime  levothyroxine, 137 mcg, Oral, Q AM  pantoprazole, 40 mg, Oral, Q AM  pregabalin, 75 mg, Oral, BID  terazosin, 1 mg, Oral, Nightly  torsemide, 40 mg, Oral, BID    Infusions   Diet  Diet: Cardiac; Healthy Heart (2-3 Na+); Fluid Consistency: Thin (IDDSI 0)    I have personally reviewed:  [x]  Laboratory   [x]  Microbiology   [x]  Radiology   [x]  EKG/Telemetry  [x]  Cardiology/Vascular   []  Pathology    []  Records       Assessment/Plan     Active Hospital Problems    Diagnosis  POA    **CHF exacerbation [I50.9]  Yes    Acute exacerbation of CHF (congestive heart failure) [I50.9]  Yes      Resolved Hospital Problems   No resolved problems to display.       84 y.o. female admitted with CHF exacerbation.    Assessment and plan  1.  Acute on chronic diastolic congestive heart failure, patient was started on IV Bumex, but now patient has been switched to torsemide, nephrology on board and managing diuretics.  Appreciate input.    2.  Type 2 diabetes mellitus, continue Accu-Cheks and sliding scale insulin coverage.    3.  Mood disorder, continue Cymbalta.    4.  T and L-spine degenerative disc disease, scoliosis, osteoporosis, continue pain control and Lyrica.    5.  Dyslipidemia, continue Lipitor.    6.  History of seizure disorder, no recent seizures, not on any antiepileptic medications.    7.  Chronic kidney disease, monitor renal function trend, nephrology is following.    8.  CODE STATUS is full code.  Further plans based on hospital course.      Eddie Vela MD  [facility]  11/07/24  11:52 EST

## 2024-11-07 NOTE — THERAPY TREATMENT NOTE
Patient Name: Halie Guidry  : 1940    MRN: [id number]                              Today's Date: 2024       Admit Date: 2024    Visit Dx:     ICD-10-CM ICD-9-CM   1. Acute on chronic congestive heart failure, unspecified heart failure type  I50.9 428.0   2. Bilateral leg edema  R60.0 782.3     Patient Active Problem List   Diagnosis    Compression fracture    Lumbar degenerative disc disease    Type 2 diabetes mellitus with hyperglycemia, with long-term current use of insulin    Hyperlipidemia    Benign essential hypertension    Primary hypothyroidism    Neuropathy    Osteoporosis    Proteinuria    Tobacco abuse    Generalized weakness    Spinal stenosis    Scoliosis    Arthritis    VBI (vertebrobasilar insufficiency)    Orthostatic hypotension    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    Severe hypothyroidism    Noncompliance with medication regimen    Vitamin D deficiency disease    Tremor    Seizure    Thoracic degenerative disc disease    Acute kidney injury (VIPUL) with acute tubular necrosis (ATN)    Hyperglycemia    Type II diabetes mellitus, uncontrolled    Lower abdominal pain    Transaminitis    Emphysematous cystitis    Choledocholithiasis    Hypokalemia    Hypoxia    Generalized abdominal pain    History of Clostridium difficile infection    History of ERCP    Hypomagnesemia    Anxiety disorder    Neuropathic pain    Intertrigo    Weakness of both lower extremities    Accelerated hypertension    Acute cystitis without hematuria    Left lower lobe pneumonia    Cellulitis and abscess of left lower extremity    Benign hypertension with CKD (chronic kidney disease) stage IV    Peripheral edema    Primary malignant neuroendocrine tumor of pancreas    Encounter for long-term (current) use of high-risk medication    Diabetic foot ulcer    Stage 3a chronic kidney disease    Pressure injury of buttock, stage 2    HTN (hypertension)    Anemia due to chronic kidney disease     Bilateral lower extremity edema    Cellulitis of right lower extremity    CKD (chronic kidney disease) stage 4, GFR 15-29 ml/min    Acute CHF    Bilateral cellulitis of lower leg    Acute UTI    UTI (urinary tract infection)    Acute UTI (urinary tract infection)    Metabolic acidosis    Cobalamin deficiency    Dependent edema    Gastroesophageal reflux disease    Mild neurocognitive disorder    Pancreatic neoplasm    Congestive heart failure    CHF exacerbation    Acute exacerbation of CHF (congestive heart failure)     Past Medical History:   Diagnosis Date    Acute metabolic encephalopathy 06/24/2022    Anxiety     Arthritis     Benign essential hypertension 08/20/2014    Bleeding disorder     Depression     Diabetes     Diabetes mellitus, type 2     Disc degeneration, lumbar     Headache, tension-type     Hyperlipidemia     Hypothyroidism     Neuropathy     Osteoporosis 09/09/2015    Pancreatic mass     Sept 2023, on Monthly Octreotide Injection    Peripheral neuropathy     Rotator cuff tear, left     Scoliosis     Shoulder pain     LEFT, TORN ROTATOR CUFF S/P FALL    Spinal stenosis      Past Surgical History:   Procedure Laterality Date    APPENDECTOMY      BILATERAL BREAST REDUCTION Bilateral 08/2015    CATARACT EXTRACTION  03/2015    CHOLECYSTECTOMY WITH INTRAOPERATIVE CHOLANGIOGRAM N/A 3/27/2022    Procedure: CHOLECYSTECTOMY LAPAROSCOPIC INTRAOPERATIVE CHOLANGIOGRAM;  Surgeon: Aiyana Hill MD;  Location: [facility] MAIN OR;  Service: General;  Laterality: N/A;    COLONOSCOPY  06/05/2015    WNL    ERCP N/A 2/25/2022    Procedure: ENDOSCOPIC RETROGRADE CHOLANGIOPANCREATOGRAPHY with sphincterotomy and balloon sweep;  Surgeon: Chilo Wilhelm MD;  Location: [facility] ENDOSCOPY;  Service: Gastroenterology;  Laterality: N/A;  PRE/POST - CBD stones    ERCP N/A 3/28/2022    Procedure: ENDOSCOPIC RETROGRADE CHOLANGIOPANCREATOGRAPHY WITH SPHINCTEROTOMY AND BALLOON SWEEP;  Surgeon: Chilo Wilhelm MD;   Location: [facility] ENDOSCOPY;  Service: Gastroenterology;  Laterality: N/A;  PRE: COMMON DUCT STONE  POST: COMMON DUCT STONE    KYPHOPLASTY      REDUCTION MAMMAPLASTY      TONSILLECTOMY        General Information       Row Name 11/07/24 1200          Physical Therapy Time and Intention    Document Type therapy note (daily note)  -MS     Mode of Treatment co-treatment;occupational therapy;physical therapy;other (see comments)  -MS       Row Name 11/07/24 1200          General Information    Existing Precautions/Restrictions fall  -MS       Row Name 11/07/24 1200          Cognition    Orientation Status (Cognition) oriented x 3  -MS       Row Name 11/07/24 1200          Safety Issues/Impairments Affecting Functional Mobility    Safety Issues Affecting Function (Mobility) insight into deficits/self-awareness;judgment  -MS     Impairments Affecting Function (Mobility) strength;balance;endurance/activity tolerance  -MS     Comment, Safety Issues/Impairments (Mobility) Co treatment medically appropriate and necessary due to patient acuity level, activity tolerance and safety of patient and staff. Treatment is focusing on progression of care and goals established in the POC.  -MS               User Key  (r) = Recorded By, (t) = Taken By, (c) = Cosigned By      Initials Name Provider Type    MS ChenVeronica, PT Physical Therapist                   Mobility       Row Name 11/07/24 1332          Bed Mobility    Supine-Sit [place] (Bed Mobility) standby assist  -MS     Sit-Supine [place] (Bed Mobility) standby assist  -MS     Assistive Device (Bed Mobility) bed rails;head of bed elevated  -MS       Row Name 11/07/24 1332          Transfers    Comment, (Transfers) x2 STS- strong assist to achieve upright. Pain in LEs limiting as well as weakness.  -MS       Row Name 11/07/24 1332          Sit-Stand Transfer    Sit-Stand [place] (Transfers) moderate assist (50% patient effort);2 person assist;verbal  cues;nonverbal cues (demo/gesture)  -MS     Assistive Device (Sit-Stand Transfers) walker, front-wheeled  -MS               User Key  (r) = Recorded By, (t) = Taken By, (c) = Cosigned By      Initials Name Provider Type    MS FigueroasVeronica, PT Physical Therapist                   Obj/Interventions       Row Name 11/07/24 1332          Balance    Static Sitting Balance standby assist  -MS     Dynamic Sitting Balance standby assist;contact guard  -MS     Position, Sitting Balance sitting edge of bed  -MS     Static Standing Balance moderate assist;maximum assist  -MS     Position/Device Used, Standing Balance supported;walker, front-wheeled  -MS               User Key  (r) = Recorded By, (t) = Taken By, (c) = Cosigned By      Initials Name Provider Type    MS ChenVeronica, PT Physical Therapist                   Goals/Plan    No documentation.                  Clinical Impression       Row Name 11/07/24 1333          Pain    Pretreatment Pain Rating 0/10 - no pain  -MS     Posttreatment Pain Rating 0/10 - no pain  -MS       Row Name 11/07/24 1333          Plan of Care Review    Plan of Care Reviewed With patient  -MS     Progress improving  -MS     Outcome Evaluation Patient resting in bed and agreeable to PT/OT cotreatment this morning. SBA required for bed mobility with incr time. Two STS completed at EOB requiring up to modAx2- pain in LEs and overall weakness limiting further trials but was able to stand for up to 45s. No new complaints. Reviewed DC plans- discussed current rec for SNF due to current assist level though patient adamently refusing at this time. PT will continue to follow.  -MS       Row Name 11/07/24 1333          Positioning and Restraints    Pre-Treatment Position in bed  -MS     Post Treatment Position bed  -MS     In Bed notified nsg;fowlers;call light within reach;encouraged to call for assist;exit alarm on  -MS               User Key  (r) = Recorded By, (t) = Taken By, (c) =  Cosigned By      Initials Name Provider Type    MS ChenVeronica, PT Physical Therapist                   Outcome Measures       Row Name 11/07/24 7375          How much help from another person do you currently need...    Turning from your back to your side while in flat bed without using bedrails? 3  -MS     Moving from lying on back to sitting on the side of a flat bed without bedrails? 3  -MS     Moving to and from a bed to a chair (including a wheelchair)? 2  -MS     Standing up from a chair using your arms (e.g., wheelchair, bedside chair)? 1  -MS     Climbing 3-5 steps with a railing? 1  -MS     To walk in hospital room? 1  -MS     AM-PAC 6 Clicks Score (PT) 11  -MS               User Key  (r) = Recorded By, (t) = Taken By, (c) = Cosigned By      Initials Name Provider Type    MS ChenVeronica, PT Physical Therapist                                 Physical Therapy Education       Title: PT OT SLP Therapies (In Progress)       Topic: Physical Therapy (In Progress)       Point: Mobility training (Done)       Learning Progress Summary            Patient Acceptance, E,TB, NR,VU by MS at 11/6/2024 1041                      Point: Home exercise program (Not Started)       Learner Progress:  Not documented in this visit.              Point: Body mechanics (Done)       Learning Progress Summary            Patient Acceptance, E,TB, NR,VU by MS at 11/6/2024 1041                      Point: Precautions (Not Started)       Learner Progress:  Not documented in this visit.                              User Key       Initials Effective Dates Name Provider Type Discipline    MS 06/16/21 -  Veronica Chen PT Physical Therapist PT                  PT Recommendation and Plan  Planned Therapy Interventions (PT): balance training, bed mobility training, gait training, home exercise program, postural re-education, patient/family education, ROM (range of motion), stair training, strengthening, transfer  training  Progress: improving  Outcome Evaluation: Patient resting in bed and agreeable to PT/OT cotreatment this morning. SBA required for bed mobility with incr time. Two STS completed at EOB requiring up to modAx2- pain in LEs and overall weakness limiting further trials but was able to stand for up to 45s. No new complaints. Reviewed DC plans- discussed current rec for SNF due to current assist level though patient adamently refusing at this time. PT will continue to follow.     Time Calculation:         PT Charges       Row Name 11/07/24 1200             Time Calculation    Start Time 1103  -MS      Stop Time 1126  -MS      Time Calculation (min) 23 min  -MS      PT Received On 11/07/24  -MS      PT - Next Appointment 11/08/24  -MS                User Key  (r) = Recorded By, (t) = Taken By, (c) = Cosigned By      Initials Name Provider Type    Veronica Valdovinos, PT Physical Therapist                  Therapy Charges for Today       Code Description Service Date Service Provider Modifiers Qty    83500096577 HC PT EVAL MOD COMPLEXITY 3 11/6/2024 Veronica Chen, PT GP 1    [id number]  PT THER PROC EA 15 MIN 11/6/2024 Veronica Chen, PT GP 1    08594931652 HC PT THER PROC EA 15 MIN 11/7/2024 Veronica Chen, PT GP 1    [id number]  PT THERAPEUTIC ACT EA 15 MIN 11/7/2024 Veronica Chen, PT GP 1            PT G-Codes  Outcome Measure Options: AM-PAC 6 Clicks Daily Activity (OT), Modified [place]  AM-PAC 6 Clicks Score (PT): 11  AM-PAC 6 Clicks Score (OT): 14  Modified [place] Scale: 4 - Moderately severe disability.  Unable to walk without assistance, and unable to attend to own bodily needs without assistance.  PT Discharge Summary  Anticipated Discharge Disposition (PT): skilled nursing facility    Veronica Chen PT  11/7/2024

## 2024-11-07 NOTE — THERAPY TREATMENT NOTE
Patient Name: Halie Guidry  : 1940    MRN: [id number]                              Today's Date: 2024       Admit Date: 2024    Visit Dx:     ICD-10-CM ICD-9-CM   1. Acute on chronic congestive heart failure, unspecified heart failure type  I50.9 428.0   2. Bilateral leg edema  R60.0 782.3     Patient Active Problem List   Diagnosis    Compression fracture    Lumbar degenerative disc disease    Type 2 diabetes mellitus with hyperglycemia, with long-term current use of insulin    Hyperlipidemia    Benign essential hypertension    Primary hypothyroidism    Neuropathy    Osteoporosis    Proteinuria    Tobacco abuse    Generalized weakness    Spinal stenosis    Scoliosis    Arthritis    VBI (vertebrobasilar insufficiency)    Orthostatic hypotension    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    Severe hypothyroidism    Noncompliance with medication regimen    Vitamin D deficiency disease    Tremor    Seizure    Thoracic degenerative disc disease    Acute kidney injury (VIPUL) with acute tubular necrosis (ATN)    Hyperglycemia    Type II diabetes mellitus, uncontrolled    Lower abdominal pain    Transaminitis    Emphysematous cystitis    Choledocholithiasis    Hypokalemia    Hypoxia    Generalized abdominal pain    History of Clostridium difficile infection    History of ERCP    Hypomagnesemia    Anxiety disorder    Neuropathic pain    Intertrigo    Weakness of both lower extremities    Accelerated hypertension    Acute cystitis without hematuria    Left lower lobe pneumonia    Cellulitis and abscess of left lower extremity    Benign hypertension with CKD (chronic kidney disease) stage IV    Peripheral edema    Primary malignant neuroendocrine tumor of pancreas    Encounter for long-term (current) use of high-risk medication    Diabetic foot ulcer    Stage 3a chronic kidney disease    Pressure injury of buttock, stage 2    HTN (hypertension)    Anemia due to chronic kidney disease     Bilateral lower extremity edema    Cellulitis of right lower extremity    CKD (chronic kidney disease) stage 4, GFR 15-29 ml/min    Acute CHF    Bilateral cellulitis of lower leg    Acute UTI    UTI (urinary tract infection)    Acute UTI (urinary tract infection)    Metabolic acidosis    Cobalamin deficiency    Dependent edema    Gastroesophageal reflux disease    Mild neurocognitive disorder    Pancreatic neoplasm    Congestive heart failure    CHF exacerbation    Acute exacerbation of CHF (congestive heart failure)     Past Medical History:   Diagnosis Date    Acute metabolic encephalopathy 06/24/2022    Anxiety     Arthritis     Benign essential hypertension 08/20/2014    Bleeding disorder     Depression     Diabetes     Diabetes mellitus, type 2     Disc degeneration, lumbar     Headache, tension-type     Hyperlipidemia     Hypothyroidism     Neuropathy     Osteoporosis 09/09/2015    Pancreatic mass     Sept 2023, on Monthly Octreotide Injection    Peripheral neuropathy     Rotator cuff tear, left     Scoliosis     Shoulder pain     LEFT, TORN ROTATOR CUFF S/P FALL    Spinal stenosis      Past Surgical History:   Procedure Laterality Date    APPENDECTOMY      BILATERAL BREAST REDUCTION Bilateral 08/2015    CATARACT EXTRACTION  03/2015    CHOLECYSTECTOMY WITH INTRAOPERATIVE CHOLANGIOGRAM N/A 3/27/2022    Procedure: CHOLECYSTECTOMY LAPAROSCOPIC INTRAOPERATIVE CHOLANGIOGRAM;  Surgeon: Aiyana Hill MD;  Location: [facility] MAIN OR;  Service: General;  Laterality: N/A;    COLONOSCOPY  06/05/2015    WNL    ERCP N/A 2/25/2022    Procedure: ENDOSCOPIC RETROGRADE CHOLANGIOPANCREATOGRAPHY with sphincterotomy and balloon sweep;  Surgeon: Chilo Wilhelm MD;  Location: [facility] ENDOSCOPY;  Service: Gastroenterology;  Laterality: N/A;  PRE/POST - CBD stones    ERCP N/A 3/28/2022    Procedure: ENDOSCOPIC RETROGRADE CHOLANGIOPANCREATOGRAPHY WITH SPHINCTEROTOMY AND BALLOON SWEEP;  Surgeon: Chilo Wilhelm MD;   Location: [facility] ENDOSCOPY;  Service: Gastroenterology;  Laterality: N/A;  PRE: COMMON DUCT STONE  POST: COMMON DUCT STONE    KYPHOPLASTY      REDUCTION MAMMAPLASTY      TONSILLECTOMY        General Information       Row Name 11/07/24 1523          OT Time and Intention    Document Type therapy note (daily note)  -MM     Mode of Treatment co-treatment;physical therapy;occupational therapy  -MM     Patient Effort adequate  -MM     Symptoms Noted During/After Treatment increased pain  -MM       Row Name 11/07/24 1523          General Information    Patient Profile Reviewed yes  -MM     Existing Precautions/Restrictions fall  -MM       Row Name 11/07/24 1523          Cognition    Orientation Status (Cognition) oriented x 3  -MM       Row Name 11/07/24 1523          Safety Issues/Impairments Affecting Functional Mobility    Impairments Affecting Function (Mobility) strength;balance;endurance/activity tolerance;pain  -MM     Comment, Safety Issues/Impairments (Mobility) Co-treatment medically necessary and appropriate d/t pt's acuity level, decreased endurance and activity tolerance, and safety of patient and staff. Treatment focused on progression of care and goals established in POC. Gait belt and non-skid socks worn.  -MM               User Key  (r) = Recorded By, (t) = Taken By, (c) = Cosigned By      Initials Name Provider Type    MM Arcelia Hoyos OT Occupational Therapist                     Mobility/ADL's       Row Name 11/07/24 1523          Bed Mobility    Bed Mobility supine-sit;sit-supine  -MM     Supine-Sit [place] (Bed Mobility) standby assist  -MM     Sit-Supine [place] (Bed Mobility) standby assist  -MM     Assistive Device (Bed Mobility) bed rails;head of bed elevated  -MM       Row Name 11/07/24 1523          Transfers    Transfers sit-stand transfer  -MM     Comment, (Transfers) x2 STS from EOB, strong assist to come fully upright, requires assist with stabilizing rwx and posterior leaning  demo'd  -MM       Row Name 11/07/24 1523          Sit-Stand Transfer    Sit-Stand [place] (Transfers) moderate assist (50% patient effort);2 person assist;verbal cues;nonverbal cues (demo/gesture)  -MM     Assistive Device (Sit-Stand Transfers) walker, front-wheeled  -MM     Comment, (Sit-Stand Transfer) quick to fatigue with static standing on second standing trial  -MM       Row Name 11/07/24 1523          Functional Mobility    Functional Mobility- Ind. Level unable to perform  -       Row Name 11/07/24 1523          Activities of Daily Living    BADL Assessment/Intervention lower body dressing;toileting  -       Row Name 11/07/24 1523          Lower Body Dressing Assessment/Training    [place] Level (Lower Body Dressing) don;socks;dependent (less than 25% patient effort)  -       Row Name 11/07/24 1523          Toileting Assessment/Training    Comment, (Toileting) bed level currently, not approp to trial BS  -MM               User Key  (r) = Recorded By, (t) = Taken By, (c) = Cosigned By      Initials Name Provider Type    Arcelia Ahmadi OT Occupational Therapist                   Obj/Interventions       Row Name 11/07/24 1525          Motor Skills    Motor Skills functional endurance  -MM     Functional Endurance fair -  -       Row Name 11/07/24 1525          Balance    Balance Assessment sitting static balance;sit to stand dynamic balance;sitting dynamic balance;standing static balance  -MM     Static Sitting Balance standby assist  -MM     Dynamic Sitting Balance standby assist;contact guard  -MM     Position, Sitting Balance sitting edge of bed;unsupported  -MM     Sit to Stand Dynamic Balance moderate assist;2-person assist  -MM     Static Standing Balance moderate assist;maximum assist  -MM     Position/Device Used, Standing Balance supported;walker, front-wheeled  -MM     Balance Interventions highly challenging;sitting;sit to stand;standing;static;supported  -MM     Comment,  Balance posterior leaning noted, poor static standing balance and posture noted  -MM               User Key  (r) = Recorded By, (t) = Taken By, (c) = Cosigned By      Initials Name Provider Type    Arcelia Ahmadi OT Occupational Therapist                   Goals/Plan    No documentation.                  Clinical Impression       Row Name 11/07/24 1525          Pain Assessment    Pretreatment Pain Rating 0/10 - no pain  -MM     Posttreatment Pain Rating 0/10 - no pain  -MM       Row Name 11/07/24 1525          Plan of Care Review    Plan of Care Reviewed With patient  -MM     Progress no change  -MM     Outcome Evaluation pt seen for OT/PT tx session this date to maximize therapeutic benefit. She continues to present below her baseline with ADL related transfers. Mod a x2 for STS, pain in BLE limiting at times and unable to progress with lateral steps or steps to chair/BSC. She demo'd poor balance with static standing on second attempt. Continue to progress as able, d/c rec education provided to pt, continue to rec SNF, however pt reports she will not go to SNF  -MM       Row Name 11/07/24 1525          Therapy Plan Review/Discharge Plan (OT)    Anticipated Discharge Disposition (OT) skilled nursing facility  -MM       Row Name 11/07/24 1525          Vital Signs    O2 Delivery Pre Treatment room air  -MM       Row Name 11/07/24 1525          Positioning and Restraints    Pre-Treatment Position in bed  -MM     Post Treatment Position bed  -MM     In Bed notified nsg;fowlers;call light within reach;encouraged to call for assist;exit alarm on;side rails up x3  -MM               User Key  (r) = Recorded By, (t) = Taken By, (c) = Cosigned By      Initials Name Provider Type    Arcelia Ahmadi OT Occupational Therapist                   Outcome Measures       Row Name 11/07/24 1527          How much help from another is currently needed...    Putting on and taking off regular lower body clothing? 1  -MM     Bathing  (including washing, rinsing, and drying) 2  -MM     Toileting (which includes using toilet bed pan or urinal) 1  -MM     Putting on and taking off regular upper body clothing 3  -MM     Taking care of personal grooming (such as brushing teeth) 3  -MM     Eating meals 4  -MM     AM-PAC 6 Clicks Score (OT) 14  -MM       Row Name 11/07/24 1335          How much help from another person do you currently need...    Turning from your back to your side while in flat bed without using bedrails? 3  -MS     Moving from lying on back to sitting on the side of a flat bed without bedrails? 3  -MS     Moving to and from a bed to a chair (including a wheelchair)? 2  -MS     Standing up from a chair using your arms (e.g., wheelchair, bedside chair)? 1  -MS     Climbing 3-5 steps with a railing? 1  -MS     To walk in hospital room? 1  -MS     AM-PAC 6 Clicks Score (PT) 11  -MS       Row Name 11/07/24 1527          Functional Assessment    Outcome Measure Options AM-PAC 6 Clicks Daily Activity (OT)  -MM               User Key  (r) = Recorded By, (t) = Taken By, (c) = Cosigned By      Initials Name Provider Type    MS FigueroasVeronica, PT Physical Therapist    Areclia Ahmadi OT Occupational Therapist                    Occupational Therapy Education       Title: PT OT SLP Therapies (In Progress)       Topic: Occupational Therapy (Done)       Point: ADL training (Done)       Description:   Instruct learner(s) on proper safety adaptation and remediation techniques during self care or transfers.   Instruct in proper use of assistive devices.                  Learning Progress Summary            Patient Acceptance, E, VU by MM at 11/6/2024 1221    Comment: role of OT                      Point: Precautions (Done)       Description:   Instruct learner(s) on prescribed precautions during self-care and functional transfers.                  Learning Progress Summary            Patient Acceptance, E, VU by MM at 11/6/2024 1221     Comment: role of OT                      Point: Body mechanics (Done)       Description:   Instruct learner(s) on proper positioning and spine alignment during self-care, functional mobility activities and/or exercises.                  Learning Progress Summary            Patient Acceptance, E, VU by AUGUSTUS at 11/6/2024 1221    Comment: role of OT                                      User Key       Initials Effective Dates Name Provider Type Discipline    AUGUSTUS 05/31/24 -  Arcelia Hoyos OT Occupational Therapist OT                  OT Recommendation and Plan  Planned Therapy Interventions (OT): activity tolerance training, BADL retraining, neuromuscular control/coordination retraining, patient/caregiver education/training, transfer/mobility retraining, strengthening exercise, ROM/therapeutic exercise, occupation/activity based interventions, functional balance retraining  Therapy Frequency (OT): 5 times/wk  Plan of Care Review  Plan of Care Reviewed With: patient  Progress: no change  Outcome Evaluation: pt seen for OT/PT tx session this date to maximize therapeutic benefit. She continues to present below her baseline with ADL related transfers. Mod a x2 for STS, pain in BLE limiting at times and unable to progress with lateral steps or steps to chair/BSC. She demo'd poor balance with static standing on second attempt. Continue to progress as able, d/c rec education provided to pt, continue to rec SNF, however pt reports she will not go to SNF     Time Calculation:   Evaluation Complexity (OT)  Review Occupational Profile/Medical/Therapy History Complexity: expanded/moderate complexity  Assessment, Occupational Performance/Identification of Deficit Complexity: 3-5 performance deficits  Clinical Decision Making Complexity (OT): detailed assessment/moderate complexity  Overall Complexity of Evaluation (OT): moderate complexity     Time Calculation- OT       Row Name 11/07/24 1528             Time Calculation- OT    OT  Start Time 1103  -MM      OT Stop Time 1126  -MM      OT Time Calculation (min) 23 min  -MM      Total Timed Code Minutes- OT 23 minute(s)  -MM      OT Received On 11/07/24  -MM      OT - Next Appointment 11/08/24  -MM         Timed Charges    80541 - OT Therapeutic Activity Minutes 23  -MM         Total Minutes    Timed Charges Total Minutes 23  -MM       Total Minutes 23  -MM                User Key  (r) = Recorded By, (t) = Taken By, (c) = Cosigned By      Initials Name Provider Type    MM Arcelia Hoyos OT Occupational Therapist                  Therapy Charges for Today       Code Description Service Date Service Provider Modifiers Qty    34002057881 HC OT THERAPEUTIC ACT EA 15 MIN 11/6/2024 Arcelia Hoyos OT GO 1    42863499550 HC OT EVAL MOD COMPLEXITY 2 11/6/2024 Arcelia Hoyos OT GO 1    78145073163 HC OT THERAPEUTIC ACT EA 15 MIN 11/7/2024 Arcelia Hoyos OT GO 2                 Arcelia Hoyos OT  11/7/2024

## 2024-11-07 NOTE — PLAN OF CARE
Goal Outcome Evaluation:  Plan of Care Reviewed With: patient        Progress: improving  Outcome Evaluation: Patient resting in bed and agreeable to PT/OT cotreatment this morning. SBA required for bed mobility with incr time. Two STS completed at EOB requiring up to modAx2- pain in LEs and overall weakness limiting further trials but was able to stand for up to 45s. No new complaints. Reviewed DC plans- discussed current rec for SNF due to current assist level though patient adamently refusing at this time. PT will continue to follow.    Anticipated Discharge Disposition (PT): skilled nursing facility

## 2024-11-07 NOTE — PROGRESS NOTES
[facility] Progress Note      Patient Name: Halie Guidry  : 1940  MRN: [id number]  Primary Care Physician:  Napoleon Mead MD  Date of admission: 2024    Subjective     Interval History:   Follow-up chronic kidney disease    The patient is feeling better, her edema is improving, her lower extremity still wrapped with gauze and Ace wrapping, no chest pain, no shortness of air, no orthopnea or PND, no nausea or vomiting.  Diuresed with IV bumetanide urine output yesterday was 2400 cc.    Review of Systems:   As noted above    Objective     Vitals:   Temp:  [97.3 °F (36.3 °C)-97.7 °F (36.5 °C)] 97.4 °F (36.3 °C)  Heart Rate:  [59-69] 62  Resp:  [18] 18  BP: (145-190)/(50-77) 166/77  Flow (L/min) (Oxygen Therapy):  [1] 1    Intake/Output Summary (Last 24 hours) at 2024 0917  Last data filed at 2024 0832  Gross per 24 hour   Intake 761.25 ml   Output 2400 ml   Net -1638.75 ml       Physical Exam:    General Appearance: alert, awake, chronically ill, no acute distress   Skin: warm and dry  HEENT: oral mucosa normal, nonicteric sclera  Neck: No JVD  Lungs: Bilateral rhonchi, breathing effort not labored  Heart: RRR, no rub  Abdomen: soft, nontender, nondistended  : no palpable bladder  Extremities: She has 1+ lower extremity edema right leg wrapped with Ace wrapping in the left leg is wrapped with gauze  Neuro: normal speech and mental status     Scheduled Meds:     atorvastatin, 80 mg, Oral, Nightly  bisoprolol, 5 mg, Oral, Daily  DULoxetine, 30 mg, Oral, Daily  ferrous sulfate, 325 mg, Oral, Every Other Day  fluticasone, 2 spray, Nasal, Daily  hydrALAZINE, 50 mg, Oral, Q8H  insulin glargine, 90 Units, Subcutaneous, Nightly  insulin lispro, 15 Units, Subcutaneous, TID AC  insulin lispro, 2-7 Units, Subcutaneous, 4x Daily AC & at Bedtime  levothyroxine, 137 mcg, Oral, Q AM  pantoprazole, 40 mg, Oral, Q AM  pregabalin, 75 mg, Oral, BID  terazosin, 1 mg, Oral,  Nightly      IV Meds:        Results Reviewed:   I have personally reviewed the results from the time of this admission to 11/7/2024 09:17 EST     Results from last 7 days   Lab Units 11/07/24  0354 11/06/24  0324 11/05/24  1813   SODIUM mmol/L 141 142 141   POTASSIUM mmol/L 4.0 3.8 4.4   CHLORIDE mmol/L 111* 111* 111*   CO2 mmol/L 20.8* 17.2* 18.4*   BUN mg/dL 35* 36* 35*   CREATININE mg/dL 2.24* 2.07* 2.11*   CALCIUM mg/dL 8.2* 8.4* 8.5*   BILIRUBIN mg/dL  --   --  0.4   ALK PHOS U/L  --   --  111   ALT (SGPT) U/L  --   --  5   AST (SGOT) U/L  --   --  8   GLUCOSE mg/dL 93 201* 154*       Estimated Creatinine Clearance: 21.4 mL/min (A) (by C-G formula based on SCr of 2.24 mg/dL (H)).    Results from last 7 days   Lab Units 11/07/24  0354 11/06/24  1602 11/05/24  1813   MAGNESIUM mg/dL  --  1.7 1.9   PHOSPHORUS mg/dL 5.0*  --   --        Results from last 7 days   Lab Units 11/07/24 0354 11/06/24 0324   URIC ACID mg/dL 10.0* 8.8*       Results from last 7 days   Lab Units 11/07/24  0354 11/06/24  0324 11/05/24  1741   WBC 10*3/mm3 6.98 9.84 12.33*   HEMOGLOBIN g/dL 8.8* 7.3* 8.4*   PLATELETS 10*3/mm3 316 307 300             Assessment / Plan     ASSESSMENT:  CKD stage IV, creatinine peaked to 3.3 due to VIPUL during recent hospitalization, recovered back down to 2.17 upon discharge, now with new baseline around 2.0-2.2.  The patient has volume excess, which has improved and the creatinine today 2.24.  Normal anion gap metabolic acidosis, secondary to CKD, CO2 up to 20 point  Acute exacerbation of CHF, Bumex was discontinued during last hospitalization.  Echo on 8/26/2024 showed EF 61% with normal LVF but mild mitral valve regurg  Anemia with CKD, on iron supplements every other day, hemoglobin up to 8.8 today  Type II DM with CKD, poorly controlled, A1c 10.3 on 10/12/2024, managed by primary team  Hypertension with CKD, BP elevated, systolic component associate with volume excess improved.    UTI, management per  primary team  Recent bilateral lower extremity cellulitis    PLAN:  Transition to oral diuretic  Monitor for diuretic toxicity  Surveillance labs    I reviewed the chart and other providers notes, reviewed labs.  Discussed the case with the patient and she was good understanding  Copied text in this note has been reviewed and is accurate as of 11/07/24.       Thank you for involving us in the care of Hlaie Guidry.  Please feel free to call with any questions.    Chidi Lanier MD  11/07/24  09:17 [facility]    [facility]  [phone]    Please note that portions of this note were completed with a voice recognition program.

## 2024-11-07 NOTE — PLAN OF CARE
Goal Outcome Evaluation:      Patient given nursing care per MD orders and hospital policies and showed no signs or symptoms of distress this shift. Currently has 1unit PRBC running.

## 2024-11-08 LAB
ALBUMIN SERPL-MCNC: 3 G/DL (ref 3.5–5.2)
ANION GAP SERPL CALCULATED.3IONS-SCNC: 14.7 MMOL/L (ref 5–15)
BASOPHILS # BLD AUTO: 0.09 10*3/MM3 (ref 0–0.2)
BASOPHILS NFR BLD AUTO: 0.9 % (ref 0–1.5)
BUN SERPL-MCNC: 43 MG/DL (ref 8–23)
BUN/CREAT SERPL: 19.5 (ref 7–25)
CALCIUM SPEC-SCNC: 7.7 MG/DL (ref 8.6–10.5)
CHLORIDE SERPL-SCNC: 104 MMOL/L (ref 98–107)
CO2 SERPL-SCNC: 18.3 MMOL/L (ref 22–29)
CREAT SERPL-MCNC: 2.21 MG/DL (ref 0.57–1)
DEPRECATED RDW RBC AUTO: 48.4 FL (ref 37–54)
EGFRCR SERPLBLD CKD-EPI 2021: 21.5 ML/MIN/1.73
EOSINOPHIL # BLD AUTO: 0.56 10*3/MM3 (ref 0–0.4)
EOSINOPHIL NFR BLD AUTO: 5.6 % (ref 0.3–6.2)
ERYTHROCYTE [DISTWIDTH] IN BLOOD BY AUTOMATED COUNT: 15.3 % (ref 12.3–15.4)
GLUCOSE BLDC GLUCOMTR-MCNC: 160 MG/DL (ref 70–130)
GLUCOSE BLDC GLUCOMTR-MCNC: 204 MG/DL (ref 70–130)
GLUCOSE BLDC GLUCOMTR-MCNC: 364 MG/DL (ref 70–130)
GLUCOSE BLDC GLUCOMTR-MCNC: 88 MG/DL (ref 70–130)
GLUCOSE SERPL-MCNC: 202 MG/DL (ref 65–99)
HCT VFR BLD AUTO: 27 % (ref 34–46.6)
HGB BLD-MCNC: 8.7 G/DL (ref 12–15.9)
IMM GRANULOCYTES # BLD AUTO: 0.07 10*3/MM3 (ref 0–0.05)
IMM GRANULOCYTES NFR BLD AUTO: 0.7 % (ref 0–0.5)
LYMPHOCYTES # BLD AUTO: 0.87 10*3/MM3 (ref 0.7–3.1)
LYMPHOCYTES NFR BLD AUTO: 8.6 % (ref 19.6–45.3)
MAGNESIUM SERPL-MCNC: 1.7 MG/DL (ref 1.6–2.4)
MCH RBC QN AUTO: 28.2 PG (ref 26.6–33)
MCHC RBC AUTO-ENTMCNC: 32.2 G/DL (ref 31.5–35.7)
MCV RBC AUTO: 87.7 FL (ref 79–97)
MONOCYTES # BLD AUTO: 0.83 10*3/MM3 (ref 0.1–0.9)
MONOCYTES NFR BLD AUTO: 8.2 % (ref 5–12)
NEUTROPHILS NFR BLD AUTO: 7.65 10*3/MM3 (ref 1.7–7)
NEUTROPHILS NFR BLD AUTO: 76 % (ref 42.7–76)
NRBC BLD AUTO-RTO: 0 /100 WBC (ref 0–0.2)
PHOSPHATE SERPL-MCNC: 4.7 MG/DL (ref 2.5–4.5)
PLATELET # BLD AUTO: 335 10*3/MM3 (ref 140–450)
PMV BLD AUTO: 9.9 FL (ref 6–12)
POTASSIUM SERPL-SCNC: 3.9 MMOL/L (ref 3.5–5.2)
RBC # BLD AUTO: 3.08 10*6/MM3 (ref 3.77–5.28)
SODIUM SERPL-SCNC: 137 MMOL/L (ref 136–145)
URATE SERPL-MCNC: 10 MG/DL (ref 2.4–5.7)
WBC NRBC COR # BLD AUTO: 10.07 10*3/MM3 (ref 3.4–10.8)

## 2024-11-08 PROCEDURE — 82948 REAGENT STRIP/BLOOD GLUCOSE: CPT

## 2024-11-08 PROCEDURE — 80069 RENAL FUNCTION PANEL: CPT | Performed by: INTERNAL MEDICINE

## 2024-11-08 PROCEDURE — 97110 THERAPEUTIC EXERCISES: CPT

## 2024-11-08 PROCEDURE — 63710000001 INSULIN GLARGINE PER 5 UNITS: Performed by: INTERNAL MEDICINE

## 2024-11-08 PROCEDURE — 84550 ASSAY OF BLOOD/URIC ACID: CPT | Performed by: INTERNAL MEDICINE

## 2024-11-08 PROCEDURE — 83735 ASSAY OF MAGNESIUM: CPT | Performed by: INTERNAL MEDICINE

## 2024-11-08 PROCEDURE — 97530 THERAPEUTIC ACTIVITIES: CPT

## 2024-11-08 PROCEDURE — 85025 COMPLETE CBC W/AUTO DIFF WBC: CPT | Performed by: INTERNAL MEDICINE

## 2024-11-08 PROCEDURE — 63710000001 INSULIN LISPRO (HUMAN) PER 5 UNITS: Performed by: INTERNAL MEDICINE

## 2024-11-08 RX ADMIN — HYDROCODONE BITARTRATE AND ACETAMINOPHEN 1 TABLET: 5; 325 TABLET ORAL at 21:58

## 2024-11-08 RX ADMIN — INSULIN LISPRO 15 UNITS: 100 INJECTION, SOLUTION INTRAVENOUS; SUBCUTANEOUS at 16:45

## 2024-11-08 RX ADMIN — HYDROCODONE BITARTRATE AND ACETAMINOPHEN 1 TABLET: 5; 325 TABLET ORAL at 15:27

## 2024-11-08 RX ADMIN — INSULIN LISPRO 15 UNITS: 100 INJECTION, SOLUTION INTRAVENOUS; SUBCUTANEOUS at 12:00

## 2024-11-08 RX ADMIN — TERAZOSIN 1 MG: 1 CAPSULE ORAL at 21:57

## 2024-11-08 RX ADMIN — INSULIN LISPRO 6 UNITS: 100 INJECTION, SOLUTION INTRAVENOUS; SUBCUTANEOUS at 12:00

## 2024-11-08 RX ADMIN — ATORVASTATIN CALCIUM 80 MG: 80 TABLET, FILM COATED ORAL at 21:57

## 2024-11-08 RX ADMIN — INSULIN LISPRO 3 UNITS: 100 INJECTION, SOLUTION INTRAVENOUS; SUBCUTANEOUS at 08:12

## 2024-11-08 RX ADMIN — INSULIN GLARGINE 90 UNITS: 100 INJECTION, SOLUTION SUBCUTANEOUS at 21:58

## 2024-11-08 RX ADMIN — LEVOTHYROXINE SODIUM 137 MCG: 137 TABLET ORAL at 05:46

## 2024-11-08 RX ADMIN — BISOPROLOL FUMARATE 5 MG: 5 TABLET, FILM COATED ORAL at 08:12

## 2024-11-08 RX ADMIN — HYDRALAZINE HYDROCHLORIDE 50 MG: 50 TABLET ORAL at 21:57

## 2024-11-08 RX ADMIN — PANTOPRAZOLE SODIUM 40 MG: 40 TABLET, DELAYED RELEASE ORAL at 05:46

## 2024-11-08 RX ADMIN — TORSEMIDE 40 MG: 20 TABLET ORAL at 16:45

## 2024-11-08 RX ADMIN — PREGABALIN 75 MG: 75 CAPSULE ORAL at 21:58

## 2024-11-08 RX ADMIN — INSULIN LISPRO 15 UNITS: 100 INJECTION, SOLUTION INTRAVENOUS; SUBCUTANEOUS at 08:12

## 2024-11-08 RX ADMIN — HYDRALAZINE HYDROCHLORIDE 50 MG: 50 TABLET ORAL at 05:46

## 2024-11-08 RX ADMIN — TORSEMIDE 40 MG: 20 TABLET ORAL at 08:12

## 2024-11-08 RX ADMIN — PREGABALIN 75 MG: 75 CAPSULE ORAL at 08:12

## 2024-11-08 RX ADMIN — HYDRALAZINE HYDROCHLORIDE 50 MG: 50 TABLET ORAL at 15:23

## 2024-11-08 RX ADMIN — DULOXETINE HYDROCHLORIDE 30 MG: 30 CAPSULE, DELAYED RELEASE ORAL at 08:12

## 2024-11-08 NOTE — PLAN OF CARE
Goal Outcome Evaluation:  Plan of Care Reviewed With: patient        Progress: improving  Outcome Evaluation: Pt seen for PT tx today. Pt agreeable to participate with therapies. Pt was able to complete bed mobility with SBA, increased time needed. Pt worked on standing for functional strengthening and improved with each stand. Pt's initial stand, pt only able to partially stand and required ModAX2. Pt stated that she is mainly in her lift chair where she sleeps. Bed height elevated and pt was able to stand to walker with MinAX2 and take steps forward and back (2ft). Pt's last stand, pt stood with SBA and ambulated about 3ft with CGA. Discussed SNF again with pt but is politely refusing d/t bad experiences. Pt wants to go back to her long-term at d/c. Will continue to follow pt for d/c needs and progress as able.

## 2024-11-08 NOTE — THERAPY TREATMENT NOTE
Patient Name: Halie Guidry  : 1940    MRN: [id number]                              Today's Date: 2024       Admit Date: 2024    Visit Dx:     ICD-10-CM ICD-9-CM   1. Acute on chronic congestive heart failure, unspecified heart failure type  I50.9 428.0   2. Bilateral leg edema  R60.0 782.3     Patient Active Problem List   Diagnosis    Compression fracture    Lumbar degenerative disc disease    Type 2 diabetes mellitus with hyperglycemia, with long-term current use of insulin    Hyperlipidemia    Benign essential hypertension    Primary hypothyroidism    Neuropathy    Osteoporosis    Proteinuria    Tobacco abuse    Generalized weakness    Spinal stenosis    Scoliosis    Arthritis    VBI (vertebrobasilar insufficiency)    Orthostatic hypotension    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    Severe hypothyroidism    Noncompliance with medication regimen    Vitamin D deficiency disease    Tremor    Seizure    Thoracic degenerative disc disease    Acute kidney injury (VIPUL) with acute tubular necrosis (ATN)    Hyperglycemia    Type II diabetes mellitus, uncontrolled    Lower abdominal pain    Transaminitis    Emphysematous cystitis    Choledocholithiasis    Hypokalemia    Hypoxia    Generalized abdominal pain    History of Clostridium difficile infection    History of ERCP    Hypomagnesemia    Anxiety disorder    Neuropathic pain    Intertrigo    Weakness of both lower extremities    Accelerated hypertension    Acute cystitis without hematuria    Left lower lobe pneumonia    Cellulitis and abscess of left lower extremity    Benign hypertension with CKD (chronic kidney disease) stage IV    Peripheral edema    Primary malignant neuroendocrine tumor of pancreas    Encounter for long-term (current) use of high-risk medication    Diabetic foot ulcer    Stage 3a chronic kidney disease    Pressure injury of buttock, stage 2    HTN (hypertension)    Anemia due to chronic kidney disease     Bilateral lower extremity edema    Cellulitis of right lower extremity    CKD (chronic kidney disease) stage 4, GFR 15-29 ml/min    Acute CHF    Bilateral cellulitis of lower leg    Acute UTI    UTI (urinary tract infection)    Acute UTI (urinary tract infection)    Metabolic acidosis    Cobalamin deficiency    Dependent edema    Gastroesophageal reflux disease    Mild neurocognitive disorder    Pancreatic neoplasm    Congestive heart failure    CHF exacerbation    Acute exacerbation of CHF (congestive heart failure)     Past Medical History:   Diagnosis Date    Acute metabolic encephalopathy 06/24/2022    Anxiety     Arthritis     Benign essential hypertension 08/20/2014    Bleeding disorder     Depression     Diabetes     Diabetes mellitus, type 2     Disc degeneration, lumbar     Headache, tension-type     Hyperlipidemia     Hypothyroidism     Neuropathy     Osteoporosis 09/09/2015    Pancreatic mass     Sept 2023, on Monthly Octreotide Injection    Peripheral neuropathy     Rotator cuff tear, left     Scoliosis     Shoulder pain     LEFT, TORN ROTATOR CUFF S/P FALL    Spinal stenosis      Past Surgical History:   Procedure Laterality Date    APPENDECTOMY      BILATERAL BREAST REDUCTION Bilateral 08/2015    CATARACT EXTRACTION  03/2015    CHOLECYSTECTOMY WITH INTRAOPERATIVE CHOLANGIOGRAM N/A 3/27/2022    Procedure: CHOLECYSTECTOMY LAPAROSCOPIC INTRAOPERATIVE CHOLANGIOGRAM;  Surgeon: Aiyana Hill MD;  Location: [facility] MAIN OR;  Service: General;  Laterality: N/A;    COLONOSCOPY  06/05/2015    WNL    ERCP N/A 2/25/2022    Procedure: ENDOSCOPIC RETROGRADE CHOLANGIOPANCREATOGRAPHY with sphincterotomy and balloon sweep;  Surgeon: Chilo Wilhelm MD;  Location: [facility] ENDOSCOPY;  Service: Gastroenterology;  Laterality: N/A;  PRE/POST - CBD stones    ERCP N/A 3/28/2022    Procedure: ENDOSCOPIC RETROGRADE CHOLANGIOPANCREATOGRAPHY WITH SPHINCTEROTOMY AND BALLOON SWEEP;  Surgeon: Chilo Wilhelm MD;   Location: [facility] ENDOSCOPY;  Service: Gastroenterology;  Laterality: N/A;  PRE: COMMON DUCT STONE  POST: COMMON DUCT STONE    KYPHOPLASTY      REDUCTION MAMMAPLASTY      TONSILLECTOMY        General Information       Row Name 11/08/24 1024          Physical Therapy Time and Intention    Document Type therapy note (daily note)  -     Mode of Treatment physical therapy  -EB       Row Name 11/08/24 1024          General Information    Patient Profile Reviewed yes  -EB     Existing Precautions/Restrictions fall  -EB       Row Name 11/08/24 1024          Cognition    Orientation Status (Cognition) oriented x 3  -EB       Row Name 11/08/24 1024          Safety Issues/Impairments Affecting Functional Mobility    Impairments Affecting Function (Mobility) strength;balance;endurance/activity tolerance;pain  -EB               User Key  (r) = Recorded By, (t) = Taken By, (c) = Cosigned By      Initials Name Provider Type    EB Tita Power PTA Physical Therapist Assistant                   Mobility       Row Name 11/08/24 1024          Bed Mobility    Supine-Sit Eddy (Bed Mobility) standby assist  -EB     Sit-Supine Eddy (Bed Mobility) standby assist  -EB     Assistive Device (Bed Mobility) bed rails;head of bed elevated  -EB       Row Name 11/08/24 1024          Sit-Stand Transfer    Sit-Stand Eddy (Transfers) moderate assist (50% patient effort);minimum assist (75% patient effort);2 person assist;standby assist  -EB     Assistive Device (Sit-Stand Transfers) walker, front-wheeled  -EB     Comment, (Sit-Stand Transfer) 3 stands. initial stand-ModAX2 and only able to partially stand. 2nd/3rd stand-bed height elevated (pt sleeps in a lift chair) MinAX2 with 2nd, SBA with 3rd. seated rest breaks in between each stand.  -EB       Row Name 11/08/24 1024          Gait/Stairs (Locomotion)    Eddy Level (Gait) contact guard;2 person assist  -EB     Assistive Device (Gait) walker, front-wheeled  -EB      Distance in Feet (Gait) 5  2ft, 3ft  -EB     Deviations/Abnormal Patterns (Gait) weight shifting decreased;stride length decreased;gait speed decreased;tess decreased  -EB     Bilateral Gait Deviations forward flexed posture;heel strike decreased  -EB               User Key  (r) = Recorded By, (t) = Taken By, (c) = Cosigned By      Initials Name Provider Type    Tita Escalera PTA Physical Therapist Assistant                   Obj/Interventions       Row Name 11/08/24 1034          Motor Skills    Therapeutic Exercise --  BLE: LAQs and seated marches (X10)  -       Row Name 11/08/24 1034          Balance    Balance Assessment sitting static balance;sitting dynamic balance;standing static balance;standing dynamic balance  -EB     Static Sitting Balance standby assist  -EB     Dynamic Sitting Balance standby assist  -EB     Position, Sitting Balance sitting edge of bed;unsupported  -EB     Static Standing Balance contact guard  -EB     Dynamic Standing Balance contact guard  -EB     Position/Device Used, Standing Balance supported;walker, front-wheeled  -               User Key  (r) = Recorded By, (t) = Taken By, (c) = Cosigned By      Initials Name Provider Type    Tita Escalera PTA Physical Therapist Assistant                   Goals/Plan    No documentation.                  Clinical Impression       Row Name 11/08/24 1035          Pain    Pretreatment Pain Rating 0/10 - no pain  -     Posttreatment Pain Rating 0/10 - no pain  -       Row Name 11/08/24 1035          Plan of Care Review    Plan of Care Reviewed With patient  -EB     Progress improving  -     Outcome Evaluation Pt seen for PT tx today. Pt agreeable to participate with therapies. Pt was able to complete bed mobility with SBA, increased time needed. Pt worked on standing for functional strengthening and improved with each stand. Pt's initial stand, pt only able to partially stand and required ModAX2. Pt stated that she is mainly  in her lift chair where she sleeps. Bed height elevated and pt was able to stand to walker with MinAX2 and take steps forward and back (2ft). Pt's last stand, pt stood with SBA and ambulated about 3ft with CGA. Discussed SNF again with pt but is politely refusing d/t bad experiences. [facility] Will continue to follow pt for d/c needs and progress as able.  -EB       Row Name 11/08/24 1035          Therapy Assessment/Plan (PT)    Therapy Frequency (PT) 5 times/wk  -EB       Row Name 11/08/24 1035          Positioning and Restraints    Pre-Treatment Position in bed  -EB     Post Treatment Position bed  -EB     In Bed supine;call light within reach;exit alarm on;encouraged to call for assist  -EB               User Key  (r) = Recorded By, (t) = Taken By, (c) = Cosigned By      Initials Name Provider Type    Tita Escalera PTA Physical Therapist Assistant                   Outcome Measures       Row Name 11/08/24 1041          How much help from another person do you currently need...    Turning from your back to your side while in flat bed without using bedrails? 4  -EB     Moving from lying on back to sitting on the side of a flat bed without bedrails? 3  -EB     Moving to and from a bed to a chair (including a wheelchair)? 3  -EB     Standing up from a chair using your arms (e.g., wheelchair, bedside chair)? 3  -EB     Climbing 3-5 steps with a railing? 1  -EB     To walk in hospital room? 2  -EB     AM-PAC 6 Clicks Score (PT) 16  -EB               User Key  (r) = Recorded By, (t) = Taken By, (c) = Cosigned By      Initials Name Provider Type    Tita Escalera PTA Physical Therapist Assistant                                 Physical Therapy Education       Title: PT OT SLP Therapies (In Progress)       Topic: Physical Therapy (In Progress)       Point: Mobility training (Done)       Learning Progress Summary            Patient Acceptance, E,D, VU,NR by ERMELINDA at 11/8/2024 1041     Acceptance, E,TB, NR,VU by MS at 11/6/2024 1041                      Point: Home exercise program (Done)       Learning Progress Summary            Patient Acceptance, E,D, VU,NR by EB at 11/8/2024 1041                      Point: Body mechanics (Done)       Learning Progress Summary            Patient Acceptance, E,D, VU,NR by EB at 11/8/2024 1041    Acceptance, E,TB, NR,VU by MS at 11/6/2024 1041                      Point: Precautions (Not Started)       Learner Progress:  Not documented in this visit.                              User Key       Initials Effective Dates Name Provider Type Discipline    MS 06/16/21 -  Veronica Chen PT Physical Therapist PT    EB 02/14/23 -  Tita Power PTA Physical Therapist Assistant PT                  PT Recommendation and Plan     Progress: improving  Outcome Evaluation: Pt seen for PT tx today. Pt agreeable to participate with therapies. Pt was able to complete bed mobility with SBA, increased time needed. Pt worked on standing for functional strengthening and improved with each stand. Pt's initial stand, pt only able to partially stand and required ModAX2. Pt stated that she is mainly in her lift chair where she sleeps. Bed height elevated and pt was able to stand to walker with MinAX2 and take steps forward and back (2ft). Pt's last stand, pt stood with SBA and ambulated about 3ft with CGA. Discussed SNF again with pt but is politely refusing d/t bad experiences. Pt wants to go back to her [facility] at d/c. Will continue to follow pt for d/c needs and progress as able.     Time Calculation:         PT Charges       Row Name 11/08/24 1026             Time Calculation    Start Time 0856  -EB      Stop Time 0937  -EB      Time Calculation (min) 41 min  -EB      PT Received On 11/08/24  -EB      PT - Next Appointment 11/11/24  -EB         Time Calculation- PT    Total Timed Code Minutes- PT 41 minute(s)  -EB                User Key  (r) = Recorded By, (t) = Taken By, (c) =  Cosigned By      Initials Name Provider Type    Tita Escalera PTA Physical Therapist Assistant                  Therapy Charges for Today       Code Description Service Date Service Provider Modifiers Qty    69774560064 HC PT THERAPEUTIC ACT EA 15 MIN 11/8/2024 Tita Power PTA GP 2    72575584254 HC PT THER PROC EA 15 MIN 11/8/2024 Tita Power PTA GP 1    46494691697 HC PT THER SUPP EA 15 MIN 11/8/2024 Tita Power PTA GP 1            PT G-Codes  Outcome Measure Options: AM-PAC 6 Clicks Daily Activity (OT)  AM-PAC 6 Clicks Score (PT): 16  AM-PAC 6 Clicks Score (OT): 14  Modified Geno Scale: 4 - Moderately severe disability.  Unable to walk without assistance, and unable to attend to own bodily needs without assistance.       Tita Power PTA  11/8/2024

## 2024-11-08 NOTE — PROGRESS NOTES
Name: Halie Guidry ADMIT: 2024   : 1940  PCP: Napoleon Mead MD    MRN: [id number] LOS: 3 days   AGE/SEX: 84 y.o. female  ROOM: [facility]     Subjective   Subjective   Patient is seen at bedside, no acute issues noted.       Objective   Objective   Vital Signs  Temp:  [97.7 °F (36.5 °C)-98.4 °F (36.9 °C)] 97.7 °F (36.5 °C)  Heart Rate:  [59-63] 63  Resp:  [18] 18  BP: (150-185)/(43-62) 176/56  SpO2:  [94 %-97 %] 96 %  on  Flow (L/min) (Oxygen Therapy):  [1] 1;   Device (Oxygen Therapy): room air  Body mass index is 34.89 kg/m².  Physical Exam  General, awake and alert.  Head and ENT, normocephalic and atraumatic.  Lungs, symmetric expansion, equal air entry bilaterally.  Heart, regular rate and rhythm.  Abdomen, soft and nontender.  Extremities, no clubbing or cyanosis.  Neuro, no focal deficits.  Skin: Warm and no rash.  Psych, normal mood and affect.  Musculoskeletal, joint examination is grossly normal.    Copied text material from yesterday's note has been reviewed for appropriate changes and remains accurate as of 24.      Results Review     I reviewed the patient's new clinical results.  Results from last 7 days   Lab Units 24  0352 24  03524  1741   WBC 10*3/mm3 10.07 6.98 9.84 12.33*   HEMOGLOBIN g/dL 8.7* 8.8* 7.3* 8.4*   PLATELETS 10*3/mm3 335 316 307 300     Results from last 7 days   Lab Units 24  0352 24  0354 24  1813   SODIUM mmol/L 137 141 142 141   POTASSIUM mmol/L 3.9 4.0 3.8 4.4   CHLORIDE mmol/L 104 111* 111* 111*   CO2 mmol/L 18.3* 20.8* 17.2* 18.4*   BUN mg/dL 43* 35* 36* 35*   CREATININE mg/dL 2.21* 2.24* 2.07* 2.11*   GLUCOSE mg/dL 202* 93 201* 154*   EGFR mL/min/1.73 21.5* 21.1* 23.3* 22.7*     Results from last 7 days   Lab Units 24  0352 24  0354 24  1813   ALBUMIN g/dL 3.0* 2.9* 3.4*   BILIRUBIN mg/dL  --   --  0.4   ALK PHOS U/L  --   --  111   AST (SGOT) U/L  --   --  8    ALT (SGPT) U/L  --   --  5     Results from last 7 days   Lab Units 11/08/24  0352 11/07/24  0354 11/06/24  1602 11/06/24  0324 11/05/24  1813   CALCIUM mg/dL 7.7* 8.2*  --  8.4* 8.5*   ALBUMIN g/dL 3.0* 2.9*  --   --  3.4*   MAGNESIUM mg/dL 1.7  --  1.7  --  1.9   PHOSPHORUS mg/dL 4.7* 5.0*  --   --   --        Hemoglobin A1C   Date/Time Value Ref Range Status   11/06/2024 1602 9.10 (H) 4.80 - 5.60 % Final     Glucose   Date/Time Value Ref Range Status   11/08/2024 1559 160 (H) 70 - 130 mg/dL Final   11/08/2024 1056 364 (H) 70 - 130 mg/dL Final   11/08/2024 0624 204 (H) 70 - 130 mg/dL Final   11/07/2024 2053 116 70 - 130 mg/dL Final   11/07/2024 1609 133 (H) 70 - 130 mg/dL Final   11/07/2024 1115 261 (H) 70 - 130 mg/dL Final   11/07/2024 0609 120 70 - 130 mg/dL Final       No radiology results for the last day    I have personally reviewed all medications:  Scheduled Medications  atorvastatin, 80 mg, Oral, Nightly  bisoprolol, 5 mg, Oral, Daily  DULoxetine, 30 mg, Oral, Daily  ferrous sulfate, 325 mg, Oral, Every Other Day  fluticasone, 2 spray, Nasal, Daily  hydrALAZINE, 50 mg, Oral, Q8H  insulin glargine, 90 Units, Subcutaneous, Nightly  insulin lispro, 15 Units, Subcutaneous, TID AC  insulin lispro, 2-7 Units, Subcutaneous, 4x Daily AC & at Bedtime  levothyroxine, 137 mcg, Oral, Q AM  pantoprazole, 40 mg, Oral, Q AM  pregabalin, 75 mg, Oral, BID  terazosin, 1 mg, Oral, Nightly  torsemide, 40 mg, Oral, BID    Infusions   Diet  Diet: Cardiac; Healthy Heart (2-3 Na+); Fluid Consistency: Thin (IDDSI 0)    I have personally reviewed:  [x]  Laboratory   [x]  Microbiology   [x]  Radiology   [x]  EKG/Telemetry  [x]  Cardiology/Vascular   []  Pathology    []  Records       Assessment/Plan     Active Hospital Problems    Diagnosis  POA    **CHF exacerbation [I50.9]  Yes    Acute exacerbation of CHF (congestive heart failure) [I50.9]  Yes      Resolved Hospital Problems   No resolved problems to display.       84 y.o.  female admitted with CHF exacerbation.    Assessment and plan  1.  Acute on chronic diastolic congestive heart failure, patient was started on IV Bumex, but now patient has been switched to torsemide, nephrology on board and managing diuretics.  Appreciate input.     2.  Type 2 diabetes mellitus, continue Accu-Cheks and sliding scale insulin coverage.     3.  Mood disorder, continue Cymbalta.     4.  T and L-spine degenerative disc disease, scoliosis, osteoporosis, continue pain control and Lyrica.     5.  Dyslipidemia, continue Lipitor.     6.  History of seizure disorder, no recent seizures, not on any antiepileptic medications.     7.  Chronic kidney disease, monitor renal function trend, nephrology is following.     8.  CODE STATUS is full code.  Further plans based on hospital course.      Eddie Vela MD  [facility]  11/08/24  17:15 EST

## 2024-11-08 NOTE — NURSING NOTE
WOCN: Discussed with unit RN regarding BLE, There is a small open wound that was bleeding thru ace wraps. Picture viewed. Patient does not want ace wraps. Cover open wound with vaseline gauze and roll gauze toes to knee change every 3 days. Recommendations updated in epic.

## 2024-11-08 NOTE — PLAN OF CARE
Goal Outcome Evaluation:      Vss. Medicated for pain x1. Patient c/o pain with ace bandages wrapped. Kerilex wraps intact.     Problem: Adult Inpatient Plan of Care  Goal: Plan of Care Review  Outcome: Progressing  Goal: Patient-Specific Goal (Individualized)  Outcome: Progressing  Goal: Absence of Hospital-Acquired Illness or Injury  Outcome: Progressing  Intervention: Identify and Manage Fall Risk  Recent Flowsheet Documentation  Taken 11/8/2024 1645 by Maribeth Parisi RN  Safety Promotion/Fall Prevention:   safety round/check completed   nonskid shoes/slippers when out of bed   fall prevention program maintained  Taken 11/8/2024 1455 by Maribeth Parisi RN  Safety Promotion/Fall Prevention:   fall prevention program maintained   safety round/check completed   nonskid shoes/slippers when out of bed  Taken 11/8/2024 1200 by Maribeth Parisi RN  Safety Promotion/Fall Prevention:   safety round/check completed   nonskid shoes/slippers when out of bed   fall prevention program maintained  Taken 11/8/2024 1030 by Maribeth Parisi RN  Safety Promotion/Fall Prevention:   safety round/check completed   nonskid shoes/slippers when out of bed   fall prevention program maintained  Taken 11/8/2024 0812 by Maribeth Parisi RN  Safety Promotion/Fall Prevention:   fall prevention program maintained   safety round/check completed   nonskid shoes/slippers when out of bed  Intervention: Prevent Skin Injury  Recent Flowsheet Documentation  Taken 11/8/2024 0812 by Maribeth Parisi RN  Body Position: sitting up in bed  Intervention: Prevent and Manage VTE (Venous Thromboembolism) Risk  Recent Flowsheet Documentation  Taken 11/8/2024 0812 by Maribeth Parisi RN  VTE Prevention/Management:   patient refused intervention   SCDs (sequential compression devices) off  Goal: Optimal Comfort and Wellbeing  Outcome: Progressing  Intervention: Provide Person-Centered Care  Recent Flowsheet Documentation  Taken 11/8/2024  1455 by Maribeth Parisi, RN  Trust Relationship/Rapport: care explained  Taken 11/8/2024 0812 by Maribeth Parisi, RN  Trust Relationship/Rapport: care explained  Goal: Readiness for Transition of Care  Outcome: Progressing

## 2024-11-08 NOTE — PLAN OF CARE
Goal Outcome Evaluation:      Pt AOx4, RA, BP elevated but stable. No acute changes overnight. C/o intermittant SOA- no s/s of distress observed. Ace bandages around BLE removed due to pt c/o pain and being too tight. Possible skin tear noted on RLE- WOCN treat and eval consult placed. Purewick in place. Bed alarm on. Call light within reach. Makes needs known. Plan of care ongoing.

## 2024-11-08 NOTE — PROGRESS NOTES
[facility] Progress Note      Patient Name: Halie Guidry  : 1940  MRN: [id number]  Primary Care Physician:  Napoleon Mead MD  Date of admission: 2024    Subjective     Interval History:   Follow-up chronic kidney disease    Patient is feeling much better, continue to have significant lower body edema denies any chest pain or shortness of air, no orthopnea or PND, she has good urine output responding to diuretics.    Review of Systems:   As noted above    Objective     Vitals:   Temp:  [97.7 °F (36.5 °C)-98.4 °F (36.9 °C)] 97.7 °F (36.5 °C)  Heart Rate:  [59-63] 63  Resp:  [18] 18  BP: (150-185)/(43-62) 176/56  Flow (L/min) (Oxygen Therapy):  [1] 1    Intake/Output Summary (Last 24 hours) at 2024 1226  Last data filed at 2024 1222  Gross per 24 hour   Intake 780 ml   Output 2300 ml   Net -1520 ml       Physical Exam:    General Appearance: alert, awake, chronically ill, no acute distress   Skin: warm and dry  HEENT: oral mucosa normal, nonicteric sclera  Neck: No JVD  Lungs: Bilateral rhonchi, breathing effort not labored  Heart: RRR, no rub  Abdomen: soft, nontender, nondistended  : no palpable bladder  Extremities: She has 1+ lower extremity edema right leg wrapped with Ace wrapping in the left leg is wrapped with gauze, she had 2+ hip and thigh edema  Neuro: normal speech and mental status     Scheduled Meds:     atorvastatin, 80 mg, Oral, Nightly  bisoprolol, 5 mg, Oral, Daily  DULoxetine, 30 mg, Oral, Daily  ferrous sulfate, 325 mg, Oral, Every Other Day  fluticasone, 2 spray, Nasal, Daily  hydrALAZINE, 50 mg, Oral, Q8H  insulin glargine, 90 Units, Subcutaneous, Nightly  insulin lispro, 15 Units, Subcutaneous, TID AC  insulin lispro, 2-7 Units, Subcutaneous, 4x Daily AC & at Bedtime  levothyroxine, 137 mcg, Oral, Q AM  pantoprazole, 40 mg, Oral, Q AM  pregabalin, 75 mg, Oral, BID  terazosin, 1 mg, Oral, Nightly  torsemide, 40 mg, Oral, BID      IV  Meds:        Results Reviewed:   I have personally reviewed the results from the time of this admission to 11/8/2024 12:26 EST     Results from last 7 days   Lab Units 11/08/24  0352 11/07/24  0354 11/06/24  0324 11/05/24  1813   SODIUM mmol/L 137 141 142 141   POTASSIUM mmol/L 3.9 4.0 3.8 4.4   CHLORIDE mmol/L 104 111* 111* 111*   CO2 mmol/L 18.3* 20.8* 17.2* 18.4*   BUN mg/dL 43* 35* 36* 35*   CREATININE mg/dL 2.21* 2.24* 2.07* 2.11*   CALCIUM mg/dL 7.7* 8.2* 8.4* 8.5*   BILIRUBIN mg/dL  --   --   --  0.4   ALK PHOS U/L  --   --   --  111   ALT (SGPT) U/L  --   --   --  5   AST (SGOT) U/L  --   --   --  8   GLUCOSE mg/dL 202* 93 201* 154*       Estimated Creatinine Clearance: 21.6 mL/min (A) (by C-G formula based on SCr of 2.21 mg/dL (H)).    Results from last 7 days   Lab Units 11/08/24  0352 11/07/24  0354 11/06/24  1602 11/05/24  1813   MAGNESIUM mg/dL 1.7  --  1.7 1.9   PHOSPHORUS mg/dL 4.7* 5.0*  --   --        Results from last 7 days   Lab Units 11/08/24  0352 11/07/24  0354 11/06/24  0324   URIC ACID mg/dL 10.0* 10.0* 8.8*       Results from last 7 days   Lab Units 11/08/24  0352 11/07/24  0354 11/06/24  0324 11/05/24  1741   WBC 10*3/mm3 10.07 6.98 9.84 12.33*   HEMOGLOBIN g/dL 8.7* 8.8* 7.3* 8.4*   PLATELETS 10*3/mm3 335 316 307 300             Assessment / Plan     ASSESSMENT:  CKD stage IV, creatinine peaked to 3.3 due to VIPUL during recent hospitalization, recovered back down to 2.17 upon discharge, now with new baseline around 2.0-2.2.  The patient has volume excess, which has improved and the creatinine today 2.21, very slight.  Normal anion gap metabolic acidosis, secondary to CKD, CO2 up to 18 point point  Acute exacerbation of CHF, Bumex was discontinued during last hospitalization.  Echo on 8/26/2024 showed EF 61% with normal LVF but mild mitral valve regurgitation.  Anemia with CKD, on iron supplements every other day, hemoglobin up to 8.7 today  Type II DM with CKD, poorly controlled, A1c 10.3  on 10/12/2024, managed by primary team  Hypertension with CKD, BP elevated, systolic component associate with volume excess improved.    UTI, management per primary team  Recent bilateral lower extremity cellulitis  Volume excess    PLAN:  Continue oral torsemide 40 mg twice daily  Monitor for diuretic toxicity  Surveillance labs    I reviewed the chart and other providers notes, reviewed labs.  Discussed the case with the patient and she was good understanding  Copied text in this note has been reviewed and is accurate as of 11/08/24.       Thank you for involving us in the care of Halie Guidry.  Please feel free to call with any questions.    Chidi Lanier MD  11/08/24  12:26 [facility]    [facility]  [phone]    Please note that portions of this note were completed with a voice recognition program.

## 2024-11-08 NOTE — CASE MANAGEMENT/SOCIAL WORK
Continued Stay Note  [facility]     Patient Name: Halie Guidry  MRN: [id number]  Today's Date: 11/8/2024    Admit Date: 11/5/2024    Plan: Retrun to [facility]   Discharge Plan       Row Name 11/08/24 1202       Plan    Plan Retrun to [facility].    Plan Comments Patient request to retrun to [facility]                   Discharge Codes    No documentation.                 Expected Discharge Date and Time       Expected Discharge Date Expected Discharge Time    Nov 9, 2024               Maribeth Khan RN

## 2024-11-09 LAB
ALBUMIN SERPL-MCNC: 3.1 G/DL (ref 3.5–5.2)
ANION GAP SERPL CALCULATED.3IONS-SCNC: 19 MMOL/L (ref 5–15)
BASOPHILS # BLD AUTO: 0.07 10*3/MM3 (ref 0–0.2)
BASOPHILS NFR BLD AUTO: 0.8 % (ref 0–1.5)
BUN SERPL-MCNC: 51 MG/DL (ref 8–23)
BUN/CREAT SERPL: 22.5 (ref 7–25)
CALCIUM SPEC-SCNC: 7.5 MG/DL (ref 8.6–10.5)
CHLORIDE SERPL-SCNC: 104 MMOL/L (ref 98–107)
CO2 SERPL-SCNC: 21 MMOL/L (ref 22–29)
CREAT SERPL-MCNC: 2.27 MG/DL (ref 0.57–1)
DEPRECATED RDW RBC AUTO: 46.8 FL (ref 37–54)
EGFRCR SERPLBLD CKD-EPI 2021: 20.8 ML/MIN/1.73
EOSINOPHIL # BLD AUTO: 0.53 10*3/MM3 (ref 0–0.4)
EOSINOPHIL NFR BLD AUTO: 5.8 % (ref 0.3–6.2)
ERYTHROCYTE [DISTWIDTH] IN BLOOD BY AUTOMATED COUNT: 15.2 % (ref 12.3–15.4)
GLUCOSE BLDC GLUCOMTR-MCNC: 153 MG/DL (ref 70–130)
GLUCOSE BLDC GLUCOMTR-MCNC: 178 MG/DL (ref 70–130)
GLUCOSE BLDC GLUCOMTR-MCNC: 210 MG/DL (ref 70–130)
GLUCOSE BLDC GLUCOMTR-MCNC: 98 MG/DL (ref 70–130)
GLUCOSE SERPL-MCNC: 135 MG/DL (ref 65–99)
HCT VFR BLD AUTO: 27.5 % (ref 34–46.6)
HGB BLD-MCNC: 8.8 G/DL (ref 12–15.9)
IMM GRANULOCYTES # BLD AUTO: 0.08 10*3/MM3 (ref 0–0.05)
IMM GRANULOCYTES NFR BLD AUTO: 0.9 % (ref 0–0.5)
LYMPHOCYTES # BLD AUTO: 1.06 10*3/MM3 (ref 0.7–3.1)
LYMPHOCYTES NFR BLD AUTO: 11.5 % (ref 19.6–45.3)
MAGNESIUM SERPL-MCNC: 1.6 MG/DL (ref 1.6–2.4)
MCH RBC QN AUTO: 27.3 PG (ref 26.6–33)
MCHC RBC AUTO-ENTMCNC: 32 G/DL (ref 31.5–35.7)
MCV RBC AUTO: 85.4 FL (ref 79–97)
MONOCYTES # BLD AUTO: 0.78 10*3/MM3 (ref 0.1–0.9)
MONOCYTES NFR BLD AUTO: 8.5 % (ref 5–12)
NEUTROPHILS NFR BLD AUTO: 6.68 10*3/MM3 (ref 1.7–7)
NEUTROPHILS NFR BLD AUTO: 72.5 % (ref 42.7–76)
NRBC BLD AUTO-RTO: 0 /100 WBC (ref 0–0.2)
PHOSPHATE SERPL-MCNC: 5.2 MG/DL (ref 2.5–4.5)
PLATELET # BLD AUTO: 358 10*3/MM3 (ref 140–450)
PMV BLD AUTO: 9.6 FL (ref 6–12)
POTASSIUM SERPL-SCNC: 3.7 MMOL/L (ref 3.5–5.2)
RBC # BLD AUTO: 3.22 10*6/MM3 (ref 3.77–5.28)
SODIUM SERPL-SCNC: 144 MMOL/L (ref 136–145)
URATE SERPL-MCNC: 10.4 MG/DL (ref 2.4–5.7)
WBC NRBC COR # BLD AUTO: 9.2 10*3/MM3 (ref 3.4–10.8)

## 2024-11-09 PROCEDURE — 63710000001 INSULIN GLARGINE PER 5 UNITS: Performed by: INTERNAL MEDICINE

## 2024-11-09 PROCEDURE — 84550 ASSAY OF BLOOD/URIC ACID: CPT | Performed by: INTERNAL MEDICINE

## 2024-11-09 PROCEDURE — 63710000001 INSULIN LISPRO (HUMAN) PER 5 UNITS: Performed by: INTERNAL MEDICINE

## 2024-11-09 PROCEDURE — 82948 REAGENT STRIP/BLOOD GLUCOSE: CPT

## 2024-11-09 PROCEDURE — 85025 COMPLETE CBC W/AUTO DIFF WBC: CPT | Performed by: INTERNAL MEDICINE

## 2024-11-09 PROCEDURE — 83735 ASSAY OF MAGNESIUM: CPT | Performed by: INTERNAL MEDICINE

## 2024-11-09 PROCEDURE — 80069 RENAL FUNCTION PANEL: CPT | Performed by: INTERNAL MEDICINE

## 2024-11-09 RX ORDER — CALCIUM ACETATE 667 MG/1
1334 CAPSULE ORAL
Status: DISCONTINUED | OUTPATIENT
Start: 2024-11-09 | End: 2024-11-10

## 2024-11-09 RX ADMIN — INSULIN GLARGINE 90 UNITS: 100 INJECTION, SOLUTION SUBCUTANEOUS at 21:20

## 2024-11-09 RX ADMIN — HYDROCODONE BITARTRATE AND ACETAMINOPHEN 1 TABLET: 5; 325 TABLET ORAL at 21:24

## 2024-11-09 RX ADMIN — INSULIN LISPRO 2 UNITS: 100 INJECTION, SOLUTION INTRAVENOUS; SUBCUTANEOUS at 17:29

## 2024-11-09 RX ADMIN — FERROUS SULFATE TAB 325 MG (65 MG ELEMENTAL FE) 325 MG: 325 (65 FE) TAB at 09:34

## 2024-11-09 RX ADMIN — ATORVASTATIN CALCIUM 80 MG: 80 TABLET, FILM COATED ORAL at 21:19

## 2024-11-09 RX ADMIN — HYDRALAZINE HYDROCHLORIDE 50 MG: 50 TABLET ORAL at 21:19

## 2024-11-09 RX ADMIN — TERAZOSIN 1 MG: 1 CAPSULE ORAL at 21:19

## 2024-11-09 RX ADMIN — DULOXETINE HYDROCHLORIDE 30 MG: 30 CAPSULE, DELAYED RELEASE ORAL at 09:34

## 2024-11-09 RX ADMIN — TORSEMIDE 40 MG: 20 TABLET ORAL at 09:34

## 2024-11-09 RX ADMIN — INSULIN LISPRO 15 UNITS: 100 INJECTION, SOLUTION INTRAVENOUS; SUBCUTANEOUS at 17:29

## 2024-11-09 RX ADMIN — PREGABALIN 75 MG: 75 CAPSULE ORAL at 21:19

## 2024-11-09 RX ADMIN — PREGABALIN 75 MG: 75 CAPSULE ORAL at 09:34

## 2024-11-09 RX ADMIN — INSULIN LISPRO 2 UNITS: 100 INJECTION, SOLUTION INTRAVENOUS; SUBCUTANEOUS at 21:20

## 2024-11-09 RX ADMIN — CALCIUM ACETATE 1334 MG: 667 CAPSULE ORAL at 17:28

## 2024-11-09 RX ADMIN — LEVOTHYROXINE SODIUM 137 MCG: 137 TABLET ORAL at 06:46

## 2024-11-09 RX ADMIN — PANTOPRAZOLE SODIUM 40 MG: 40 TABLET, DELAYED RELEASE ORAL at 06:46

## 2024-11-09 RX ADMIN — INSULIN LISPRO 15 UNITS: 100 INJECTION, SOLUTION INTRAVENOUS; SUBCUTANEOUS at 12:09

## 2024-11-09 RX ADMIN — HYDRALAZINE HYDROCHLORIDE 50 MG: 50 TABLET ORAL at 15:29

## 2024-11-09 RX ADMIN — BISOPROLOL FUMARATE 5 MG: 5 TABLET, FILM COATED ORAL at 09:34

## 2024-11-09 RX ADMIN — INSULIN LISPRO 3 UNITS: 100 INJECTION, SOLUTION INTRAVENOUS; SUBCUTANEOUS at 12:08

## 2024-11-09 RX ADMIN — TORSEMIDE 40 MG: 20 TABLET ORAL at 17:29

## 2024-11-09 RX ADMIN — HYDROCODONE BITARTRATE AND ACETAMINOPHEN 1 TABLET: 5; 325 TABLET ORAL at 09:36

## 2024-11-09 NOTE — PROGRESS NOTES
Name: Halie Guidry ADMIT: 2024   : 1940  PCP: Napoleon Mead MD    MRN: [id number] LOS: 4 days   AGE/SEX: 84 y.o. female  ROOM: [facility]     Subjective   Subjective   Patient seen at bedside, no issues noted overnight.  She appears comfortable, lying in bed.       Objective   Objective   Vital Signs  Temp:  [97.5 °F (36.4 °C)-98.4 °F (36.9 °C)] 98.4 °F (36.9 °C)  Heart Rate:  [58-65] 61  Resp:  [18] 18  BP: (140-172)/(51-63) 145/58  SpO2:  [94 %-98 %] 95 %  on   ;   Device (Oxygen Therapy): room air  Body mass index is 35.22 kg/m².  Physical Exam  General, awake and alert.  Head and ENT, normocephalic and atraumatic.  Lungs, symmetric expansion, equal air entry bilaterally.  Heart, regular rate and rhythm.  Abdomen, soft and nontender.  Extremities, no clubbing or cyanosis.  Neuro, no focal deficits.  Skin: Warm and no rash.  Psych, normal mood and affect.  Musculoskeletal, joint examination is grossly normal.     Copied text material from yesterday's note has been reviewed for appropriate changes and remains accurate as of 24.         Results Review     I reviewed the patient's new clinical results.  Results from last 7 days   Lab Units 24  032   WBC 10*3/mm3 9.20 10.07 6.98 9.84   HEMOGLOBIN g/dL 8.8* 8.7* 8.8* 7.3*   PLATELETS 10*3/mm3 358 335 316 307     Results from last 7 days   Lab Units 24  025242 24  032   SODIUM mmol/L 144 137 141 142   POTASSIUM mmol/L 3.7 3.9 4.0 3.8   CHLORIDE mmol/L 104 104 111* 111*   CO2 mmol/L 21.0* 18.3* 20.8* 17.2*   BUN mg/dL 51* 43* 35* 36*   CREATININE mg/dL 2.27* 2.21* 2.24* 2.07*   GLUCOSE mg/dL 135* 202* 93 201*   EGFR mL/min/1.73 20.8* 21.5* 21.1* 23.3*     Results from last 7 days   Lab Units 24  0256 24  0352 24  0354 24  1813   ALBUMIN g/dL 3.1* 3.0* 2.9* 3.4*   BILIRUBIN mg/dL  --   --   --  0.4   ALK PHOS U/L  --   --   --   111   AST (SGOT) U/L  --   --   --  8   ALT (SGPT) U/L  --   --   --  5     Results from last 7 days   Lab Units 11/09/24  0256 11/08/24  0352 11/07/24  0354 11/06/24  1602 11/06/24  0324 11/05/24  1813   CALCIUM mg/dL 7.5* 7.7* 8.2*  --  8.4* 8.5*   ALBUMIN g/dL 3.1* 3.0* 2.9*  --   --  3.4*   MAGNESIUM mg/dL 1.6 1.7  --  1.7  --  1.9   PHOSPHORUS mg/dL 5.2* 4.7* 5.0*  --   --   --        Glucose   Date/Time Value Ref Range Status   11/09/2024 1536 153 (H) 70 - 130 mg/dL Final   11/09/2024 1035 210 (H) 70 - 130 mg/dL Final   11/09/2024 0615 98 70 - 130 mg/dL Final   11/08/2024 2053 88 70 - 130 mg/dL Final   11/08/2024 1559 160 (H) 70 - 130 mg/dL Final   11/08/2024 1056 364 (H) 70 - 130 mg/dL Final   11/08/2024 0624 204 (H) 70 - 130 mg/dL Final       No radiology results for the last day    I have personally reviewed all medications:  Scheduled Medications  atorvastatin, 80 mg, Oral, Nightly  bisoprolol, 5 mg, Oral, Daily  calcium acetate, 1,334 mg, Oral, TID With Meals  DULoxetine, 30 mg, Oral, Daily  ferrous sulfate, 325 mg, Oral, Every Other Day  fluticasone, 2 spray, Nasal, Daily  hydrALAZINE, 50 mg, Oral, Q8H  insulin glargine, 90 Units, Subcutaneous, Nightly  insulin lispro, 15 Units, Subcutaneous, TID AC  insulin lispro, 2-7 Units, Subcutaneous, 4x Daily AC & at Bedtime  levothyroxine, 137 mcg, Oral, Q AM  pantoprazole, 40 mg, Oral, Q AM  pregabalin, 75 mg, Oral, BID  terazosin, 1 mg, Oral, Nightly  torsemide, 40 mg, Oral, BID    Infusions   Diet  Diet: Cardiac; Healthy Heart (2-3 Na+); Fluid Consistency: Thin (IDDSI 0)    I have personally reviewed:  [x]  Laboratory   [x]  Microbiology   [x]  Radiology   [x]  EKG/Telemetry  [x]  Cardiology/Vascular   []  Pathology    []  Records       Assessment/Plan     Active Hospital Problems    Diagnosis  POA    **CHF exacerbation [I50.9]  Yes    Acute exacerbation of CHF (congestive heart failure) [I50.9]  Yes      Resolved Hospital Problems   No resolved problems to  display.       84 y.o. female admitted with CHF exacerbation.    Assessment and plan  1.  Acute on chronic diastolic congestive heart failure, patient was started on IV Bumex, but now patient has been switched to torsemide, nephrology on board and managing diuretics.  Appreciate input.     2.  Type 2 diabetes mellitus, continue Accu-Cheks and sliding scale insulin coverage.     3.  Mood disorder, continue Cymbalta.     4.  T and L-spine degenerative disc disease, scoliosis, osteoporosis, continue pain control and Lyrica.     5.  Dyslipidemia, continue Lipitor.     6.  History of seizure disorder, no recent seizures, not on any antiepileptic medications.     7.  Chronic kidney disease, monitor renal function trend, nephrology is following.     8.  CODE STATUS is full code.  Further plans based on hospital course.         Eddie Vela MD  [facility]  11/09/24  16:08 EST

## 2024-11-09 NOTE — PLAN OF CARE
Goal Outcome Evaluation:  Plan of Care Reviewed With: patient        Progress: improving  Outcome Evaluation: pt is axox4. room air. q2h turns. on PO torsemide now. pt wants to go back to assisted living. unable to complete orthostatics due to weakness. VSS.

## 2024-11-09 NOTE — PROGRESS NOTES
[facility] Progress Note      Patient Name: Halie Guidry  : 1940  MRN: [id number]  Primary Care Physician:  Napoleon Mead MD  Date of admission: 2024    Subjective     Interval History:   Follow-up chronic kidney disease    Patient is feeling the same. Continues to have lower body edema with right leg pain; denies any chest pain, nausea, or vomiting, but has some unchanged shortness of breath occasionally; she has good urine output responding to diuretics.    Review of Systems:   As noted above    Objective     Vitals:   Temp:  [97.5 °F (36.4 °C)-98.1 °F (36.7 °C)] 97.5 °F (36.4 °C)  Heart Rate:  [58-65] 59  Resp:  [18] 18  BP: (140-172)/(51-63) 144/53    Intake/Output Summary (Last 24 hours) at 2024 1146  Last data filed at 2024 0342  Gross per 24 hour   Intake 930 ml   Output 1800 ml   Net -870 ml       Physical Exam:    General Appearance: awake, chronically ill, no acute distress   Skin: warm and dry  HEENT: oral mucosa normal, nonicteric sclera  Neck: No JVD  Lungs: L base crackles, breathing effort not labored  Heart: RRR, no rub  Abdomen: soft, nontender, nondistended  : no palpable bladder  Extremities: 1-2+ lower extremity edema, legs wrapped with gauze, 1+ hip edema  Neuro: normal speech and mental status     Scheduled Meds:     atorvastatin, 80 mg, Oral, Nightly  bisoprolol, 5 mg, Oral, Daily  calcium acetate, 1,334 mg, Oral, TID With Meals  DULoxetine, 30 mg, Oral, Daily  ferrous sulfate, 325 mg, Oral, Every Other Day  fluticasone, 2 spray, Nasal, Daily  hydrALAZINE, 50 mg, Oral, Q8H  insulin glargine, 90 Units, Subcutaneous, Nightly  insulin lispro, 15 Units, Subcutaneous, TID AC  insulin lispro, 2-7 Units, Subcutaneous, 4x Daily AC & at Bedtime  levothyroxine, 137 mcg, Oral, Q AM  pantoprazole, 40 mg, Oral, Q AM  pregabalin, 75 mg, Oral, BID  terazosin, 1 mg, Oral, Nightly  torsemide, 40 mg, Oral, BID      IV Meds:        Results Reviewed:    I have personally reviewed the results from the time of this admission to 11/9/2024 11:46 EST     Results from last 7 days   Lab Units 11/09/24 0256 11/08/24 0352 11/07/24 0354 11/06/24 0324 11/05/24  1813   SODIUM mmol/L 144 137 141   < > 141   POTASSIUM mmol/L 3.7 3.9 4.0   < > 4.4   CHLORIDE mmol/L 104 104 111*   < > 111*   CO2 mmol/L 21.0* 18.3* 20.8*   < > 18.4*   BUN mg/dL 51* 43* 35*   < > 35*   CREATININE mg/dL 2.27* 2.21* 2.24*   < > 2.11*   CALCIUM mg/dL 7.5* 7.7* 8.2*   < > 8.5*   BILIRUBIN mg/dL  --   --   --   --  0.4   ALK PHOS U/L  --   --   --   --  111   ALT (SGPT) U/L  --   --   --   --  5   AST (SGOT) U/L  --   --   --   --  8   GLUCOSE mg/dL 135* 202* 93   < > 154*    < > = values in this interval not displayed.       Estimated Creatinine Clearance: 21.1 mL/min (A) (by C-G formula based on SCr of 2.27 mg/dL (H)).    Results from last 7 days   Lab Units 11/09/24 0256 11/08/24 0352 11/07/24 0354 11/06/24  1602   MAGNESIUM mg/dL 1.6 1.7  --  1.7   PHOSPHORUS mg/dL 5.2* 4.7* 5.0*  --        Results from last 7 days   Lab Units 11/09/24 0256 11/08/24 0352 11/07/24 0354 11/06/24  0324   URIC ACID mg/dL 10.4* 10.0* 10.0* 8.8*       Results from last 7 days   Lab Units 11/09/24 0256 11/08/24 0352 11/07/24 0354 11/06/24 0324 11/05/24  1741   WBC 10*3/mm3 9.20 10.07 6.98 9.84 12.33*   HEMOGLOBIN g/dL 8.8* 8.7* 8.8* 7.3* 8.4*   PLATELETS 10*3/mm3 358 335 316 307 300             Assessment / Plan     ASSESSMENT:  CKD stage IV, creatinine peaked to 3.3 due to VIPUL during recent hospitalization, recovered back down to 2.17 upon discharge, now with new baseline around 2.0-2.2.  The patient has volume excess, which has improved and the creatinine today 2.27, relatively stable from yesterday  Volume excess,secondary to acute CHF exacerbation.  improving with diuretics  High anion gap metabolic acidosis, secondary to CKD, CO2 21  Acute exacerbation of CHF, Bumex was discontinued during last  hospitalization.  Echo on 8/26/2024 showed EF 61% with normal LVF but mild mitral valve regurgitation.  Anemia with CKD, on iron supplements every other day, hemoglobin 8.8, stable  Type II DM with CKD, poorly controlled, A1c 10.3 on 10/12/2024, managed by primary team  Hypertension with CKD, BP elevated, systolic component associate with volume excess, improved  UTI, management per primary team  Recent bilateral lower extremity cellulitis  Hyperphosphatemia associate with chronic kidney disease    PLAN:  Continue oral torsemide 40 mg twice daily  Monitor for diuretic toxicity  Add calcium acetate as a phosphate binder  Surveillance labs    I reviewed the chart and other providers notes, reviewed labs.  Discussed the case with the patient and she was good understanding  Copied text in this note has been reviewed and is accurate as of 11/09/24.       Thank you for involving us in the care of Halie Guidry.  Please feel free to call with any questions.    Chidi Lanier MD  11/09/24  11:46 [facility]    [facility]  [phone]    Please note that portions of this note were completed with a voice recognition program.

## 2024-11-09 NOTE — NURSING NOTE
Unable to obtain orthostatic blood pressures due to weakness. Pt was unable to stand a full minute without feeling like she was going to fall.

## 2024-11-10 LAB
ALBUMIN SERPL-MCNC: 3.3 G/DL (ref 3.5–5.2)
ANION GAP SERPL CALCULATED.3IONS-SCNC: 14.7 MMOL/L (ref 5–15)
BASOPHILS # BLD AUTO: 0.1 10*3/MM3 (ref 0–0.2)
BASOPHILS NFR BLD AUTO: 1.1 % (ref 0–1.5)
BUN SERPL-MCNC: 57 MG/DL (ref 8–23)
BUN/CREAT SERPL: 24.4 (ref 7–25)
CALCIUM SPEC-SCNC: 8.4 MG/DL (ref 8.6–10.5)
CHLORIDE SERPL-SCNC: 103 MMOL/L (ref 98–107)
CO2 SERPL-SCNC: 21.3 MMOL/L (ref 22–29)
CREAT SERPL-MCNC: 2.34 MG/DL (ref 0.57–1)
DEPRECATED RDW RBC AUTO: 48.3 FL (ref 37–54)
EGFRCR SERPLBLD CKD-EPI 2021: 20.1 ML/MIN/1.73
EOSINOPHIL # BLD AUTO: 0.48 10*3/MM3 (ref 0–0.4)
EOSINOPHIL NFR BLD AUTO: 5.3 % (ref 0.3–6.2)
ERYTHROCYTE [DISTWIDTH] IN BLOOD BY AUTOMATED COUNT: 15.3 % (ref 12.3–15.4)
GLUCOSE BLDC GLUCOMTR-MCNC: 132 MG/DL (ref 70–130)
GLUCOSE BLDC GLUCOMTR-MCNC: 209 MG/DL (ref 70–130)
GLUCOSE BLDC GLUCOMTR-MCNC: 218 MG/DL (ref 70–130)
GLUCOSE BLDC GLUCOMTR-MCNC: 277 MG/DL (ref 70–130)
GLUCOSE SERPL-MCNC: 108 MG/DL (ref 65–99)
HCT VFR BLD AUTO: 28 % (ref 34–46.6)
HGB BLD-MCNC: 9.4 G/DL (ref 12–15.9)
IMM GRANULOCYTES # BLD AUTO: 0.06 10*3/MM3 (ref 0–0.05)
IMM GRANULOCYTES NFR BLD AUTO: 0.7 % (ref 0–0.5)
LYMPHOCYTES # BLD AUTO: 0.91 10*3/MM3 (ref 0.7–3.1)
LYMPHOCYTES NFR BLD AUTO: 10 % (ref 19.6–45.3)
MCH RBC QN AUTO: 28.9 PG (ref 26.6–33)
MCHC RBC AUTO-ENTMCNC: 33.6 G/DL (ref 31.5–35.7)
MCV RBC AUTO: 86.2 FL (ref 79–97)
MONOCYTES # BLD AUTO: 0.76 10*3/MM3 (ref 0.1–0.9)
MONOCYTES NFR BLD AUTO: 8.4 % (ref 5–12)
NEUTROPHILS NFR BLD AUTO: 6.77 10*3/MM3 (ref 1.7–7)
NEUTROPHILS NFR BLD AUTO: 74.5 % (ref 42.7–76)
NRBC BLD AUTO-RTO: 0 /100 WBC (ref 0–0.2)
PHOSPHATE SERPL-MCNC: 5.7 MG/DL (ref 2.5–4.5)
PLATELET # BLD AUTO: 389 10*3/MM3 (ref 140–450)
PMV BLD AUTO: 9.6 FL (ref 6–12)
POTASSIUM SERPL-SCNC: 3.9 MMOL/L (ref 3.5–5.2)
RBC # BLD AUTO: 3.25 10*6/MM3 (ref 3.77–5.28)
SODIUM SERPL-SCNC: 139 MMOL/L (ref 136–145)
URATE SERPL-MCNC: 10.4 MG/DL (ref 2.4–5.7)
WBC NRBC COR # BLD AUTO: 9.08 10*3/MM3 (ref 3.4–10.8)

## 2024-11-10 PROCEDURE — 63710000001 INSULIN GLARGINE PER 5 UNITS: Performed by: INTERNAL MEDICINE

## 2024-11-10 PROCEDURE — 85025 COMPLETE CBC W/AUTO DIFF WBC: CPT | Performed by: INTERNAL MEDICINE

## 2024-11-10 PROCEDURE — 84550 ASSAY OF BLOOD/URIC ACID: CPT | Performed by: INTERNAL MEDICINE

## 2024-11-10 PROCEDURE — 80069 RENAL FUNCTION PANEL: CPT | Performed by: INTERNAL MEDICINE

## 2024-11-10 PROCEDURE — 82948 REAGENT STRIP/BLOOD GLUCOSE: CPT

## 2024-11-10 PROCEDURE — 63710000001 INSULIN LISPRO (HUMAN) PER 5 UNITS: Performed by: INTERNAL MEDICINE

## 2024-11-10 RX ORDER — CALCIUM ACETATE 667 MG/1
2001 CAPSULE ORAL
Status: DISCONTINUED | OUTPATIENT
Start: 2024-11-10 | End: 2024-11-11 | Stop reason: HOSPADM

## 2024-11-10 RX ADMIN — INSULIN GLARGINE 90 UNITS: 100 INJECTION, SOLUTION SUBCUTANEOUS at 21:11

## 2024-11-10 RX ADMIN — HYDRALAZINE HYDROCHLORIDE 50 MG: 50 TABLET ORAL at 14:16

## 2024-11-10 RX ADMIN — ATORVASTATIN CALCIUM 80 MG: 80 TABLET, FILM COATED ORAL at 21:00

## 2024-11-10 RX ADMIN — HYDROCODONE BITARTRATE AND ACETAMINOPHEN 1 TABLET: 5; 325 TABLET ORAL at 10:45

## 2024-11-10 RX ADMIN — DULOXETINE HYDROCHLORIDE 30 MG: 30 CAPSULE, DELAYED RELEASE ORAL at 10:42

## 2024-11-10 RX ADMIN — Medication 10 ML: at 21:02

## 2024-11-10 RX ADMIN — PREGABALIN 75 MG: 75 CAPSULE ORAL at 10:42

## 2024-11-10 RX ADMIN — BISOPROLOL FUMARATE 5 MG: 5 TABLET, FILM COATED ORAL at 10:41

## 2024-11-10 RX ADMIN — TERAZOSIN 1 MG: 1 CAPSULE ORAL at 21:00

## 2024-11-10 RX ADMIN — INSULIN LISPRO 4 UNITS: 100 INJECTION, SOLUTION INTRAVENOUS; SUBCUTANEOUS at 12:25

## 2024-11-10 RX ADMIN — LEVOTHYROXINE SODIUM 137 MCG: 137 TABLET ORAL at 05:18

## 2024-11-10 RX ADMIN — TORSEMIDE 40 MG: 20 TABLET ORAL at 16:39

## 2024-11-10 RX ADMIN — HYDRALAZINE HYDROCHLORIDE 50 MG: 50 TABLET ORAL at 21:00

## 2024-11-10 RX ADMIN — INSULIN LISPRO 3 UNITS: 100 INJECTION, SOLUTION INTRAVENOUS; SUBCUTANEOUS at 21:11

## 2024-11-10 RX ADMIN — TORSEMIDE 40 MG: 20 TABLET ORAL at 10:42

## 2024-11-10 RX ADMIN — CALCIUM ACETATE 2001 MG: 667 CAPSULE ORAL at 16:39

## 2024-11-10 RX ADMIN — HYDROCODONE BITARTRATE AND ACETAMINOPHEN 1 TABLET: 5; 325 TABLET ORAL at 21:00

## 2024-11-10 RX ADMIN — PREGABALIN 75 MG: 75 CAPSULE ORAL at 21:00

## 2024-11-10 RX ADMIN — HYDRALAZINE HYDROCHLORIDE 50 MG: 50 TABLET ORAL at 05:18

## 2024-11-10 RX ADMIN — INSULIN LISPRO 15 UNITS: 100 INJECTION, SOLUTION INTRAVENOUS; SUBCUTANEOUS at 12:24

## 2024-11-10 RX ADMIN — PANTOPRAZOLE SODIUM 40 MG: 40 TABLET, DELAYED RELEASE ORAL at 05:18

## 2024-11-10 RX ADMIN — CALCIUM ACETATE 1334 MG: 667 CAPSULE ORAL at 10:42

## 2024-11-10 RX ADMIN — CALCIUM ACETATE 2001 MG: 667 CAPSULE ORAL at 12:24

## 2024-11-10 RX ADMIN — INSULIN LISPRO 15 UNITS: 100 INJECTION, SOLUTION INTRAVENOUS; SUBCUTANEOUS at 16:40

## 2024-11-10 RX ADMIN — INSULIN LISPRO 3 UNITS: 100 INJECTION, SOLUTION INTRAVENOUS; SUBCUTANEOUS at 16:40

## 2024-11-10 NOTE — PROGRESS NOTES
Name: Halie Guidry ADMIT: 2024   : 1940  PCP: Napoleon Mead MD    MRN: [id number] LOS: 5 days   AGE/SEX: 84 y.o. female  ROOM: [facility]     Subjective   Subjective   Patient is seen at bedside, still feels weak.       Objective   Objective   Vital Signs  Temp:  [98.1 °F (36.7 °C)-98.6 °F (37 °C)] 98.2 °F (36.8 °C)  Heart Rate:  [61-64] 64  Resp:  [18] 18  BP: (122-185)/(55-58) 185/56  SpO2:  [92 %-99 %] 99 %  on   ;   Device (Oxygen Therapy): room air  Body mass index is 34.52 kg/m².  Physical Exam  General, awake and alert.  Head and ENT, normocephalic and atraumatic.  Lungs, symmetric expansion, equal air entry bilaterally.  Heart, regular rate and rhythm.  Abdomen, soft and nontender.  Extremities, no clubbing or cyanosis.  Neuro, no focal deficits.  Skin: Warm and no rash.  Psych, normal mood and affect.  Musculoskeletal, joint examination is grossly normal.     Copied text material from yesterday's note has been reviewed for appropriate changes and remains accurate as of 11/10/24.           Results Review     I reviewed the patient's new clinical results.  Results from last 7 days   Lab Units 11/10/24  03024  035   WBC 10*3/mm3 9.08 9.20 10.07 6.98   HEMOGLOBIN g/dL 9.4* 8.8* 8.7* 8.8*   PLATELETS 10*3/mm3 389 358 335 316     Results from last 7 days   Lab Units 11/10/24  03024  035   SODIUM mmol/L 139 144 137 141   POTASSIUM mmol/L 3.9 3.7 3.9 4.0   CHLORIDE mmol/L 103 104 104 111*   CO2 mmol/L 21.3* 21.0* 18.3* 20.8*   BUN mg/dL 57* 51* 43* 35*   CREATININE mg/dL 2.34* 2.27* 2.21* 2.24*   GLUCOSE mg/dL 108* 135* 202* 93   EGFR mL/min/1.73 20.1* 20.8* 21.5* 21.1*     Results from last 7 days   Lab Units 11/10/24  0309 24  0256 24  0352 24  0354 24  1813   ALBUMIN g/dL 3.3* 3.1* 3.0* 2.9* 3.4*   BILIRUBIN mg/dL  --   --   --   --  0.4   ALK PHOS U/L  --   --   --   --  111   AST (SGOT)  U/L  --   --   --   --  8   ALT (SGPT) U/L  --   --   --   --  5     Results from last 7 days   Lab Units 11/10/24  0309 11/09/24  0256 11/08/24  0352 11/07/24  0354 11/06/24  1602 11/06/24  0324 11/05/24  1813   CALCIUM mg/dL 8.4* 7.5* 7.7* 8.2*  --    < > 8.5*   ALBUMIN g/dL 3.3* 3.1* 3.0* 2.9*  --   --  3.4*   MAGNESIUM mg/dL  --  1.6 1.7  --  1.7  --  1.9   PHOSPHORUS mg/dL 5.7* 5.2* 4.7* 5.0*  --   --   --     < > = values in this interval not displayed.       Glucose   Date/Time Value Ref Range Status   11/10/2024 1039 277 (H) 70 - 130 mg/dL Final   11/10/2024 0620 132 (H) 70 - 130 mg/dL Final   11/09/2024 2029 178 (H) 70 - 130 mg/dL Final   11/09/2024 1536 153 (H) 70 - 130 mg/dL Final   11/09/2024 1035 210 (H) 70 - 130 mg/dL Final   11/09/2024 0615 98 70 - 130 mg/dL Final   11/08/2024 2053 88 70 - 130 mg/dL Final       No radiology results for the last day    I have personally reviewed all medications:  Scheduled Medications  atorvastatin, 80 mg, Oral, Nightly  bisoprolol, 5 mg, Oral, Daily  calcium acetate, 2,001 mg, Oral, TID With Meals  DULoxetine, 30 mg, Oral, Daily  ferrous sulfate, 325 mg, Oral, Every Other Day  fluticasone, 2 spray, Nasal, Daily  hydrALAZINE, 50 mg, Oral, Q8H  insulin glargine, 90 Units, Subcutaneous, Nightly  insulin lispro, 15 Units, Subcutaneous, TID AC  insulin lispro, 2-7 Units, Subcutaneous, 4x Daily AC & at Bedtime  levothyroxine, 137 mcg, Oral, Q AM  pantoprazole, 40 mg, Oral, Q AM  pregabalin, 75 mg, Oral, BID  terazosin, 1 mg, Oral, Nightly  torsemide, 40 mg, Oral, BID    Infusions   Diet  Diet: Cardiac; Healthy Heart (2-3 Na+); Fluid Consistency: Thin (IDDSI 0)    I have personally reviewed:  [x]  Laboratory   [x]  Microbiology   [x]  Radiology   [x]  EKG/Telemetry  [x]  Cardiology/Vascular   []  Pathology    []  Records       Assessment/Plan     Active Hospital Problems    Diagnosis  POA    **CHF exacerbation [I50.9]  Yes    Acute exacerbation of CHF (congestive heart  failure) [I50.9]  Yes      Resolved Hospital Problems   No resolved problems to display.       84 y.o. female admitted with CHF exacerbation.    Assessment and plan  1.  Acute on chronic diastolic congestive heart failure, patient was started on IV Bumex, but now patient has been switched to torsemide, nephrology on board and managing diuretics.  Appreciate input.     2.  Type 2 diabetes mellitus, continue Accu-Cheks and sliding scale insulin coverage.     3.  Mood disorder, continue Cymbalta.     4.  T and L-spine degenerative disc disease, scoliosis, osteoporosis, continue pain control and Lyrica.     5.  Dyslipidemia, continue Lipitor.     6.  History of seizure disorder, no recent seizures, not on any antiepileptic medications.     7.  Chronic kidney disease, monitor renal function trend, nephrology is following.     8.  CODE STATUS is full code.  Further plans based on hospital course.  Discharge to rehab versus home health.         Eddie Vela MD  [facility]  11/10/24  15:16 EST

## 2024-11-10 NOTE — PROGRESS NOTES
[facility] Progress Note      Patient Name: Halie Guidry  : 1940  MRN: [id number]  Primary Care Physician:  Napoleon Mead MD  Date of admission: 2024    Subjective     Interval History:   Follow-up chronic kidney disease    Patient is feeling the same. Denies any chest pain, nausea, or vomiting, although leg swelling is better today.  Continues to have good urine output with diuretics.    Review of Systems:   As noted above    Objective     Vitals:   Temp:  [98.1 °F (36.7 °C)-98.6 °F (37 °C)] 98.4 °F (36.9 °C)  Heart Rate:  [61-63] 61  Resp:  [18] 18  BP: (122-179)/(55-58) 122/55    Intake/Output Summary (Last 24 hours) at 11/10/2024 1141  Last data filed at 11/10/2024 0512  Gross per 24 hour   Intake 420 ml   Output 2400 ml   Net -1980 ml       Physical Exam:    General Appearance: awake, chronically ill, no acute distress   Skin: warm and dry  HEENT: oral mucosa normal, nonicteric sclera  Neck: No JVD  Lungs: Clear on auscultation, breathing effort not labored  Heart: RRR, no rub  Abdomen: soft, nontender, nondistended  : no palpable bladder  Extremities: 1+ lower extremity edema, legs wrapped with gauze  Neuro: normal speech and mental status     Scheduled Meds:     atorvastatin, 80 mg, Oral, Nightly  bisoprolol, 5 mg, Oral, Daily  calcium acetate, 1,334 mg, Oral, TID With Meals  DULoxetine, 30 mg, Oral, Daily  ferrous sulfate, 325 mg, Oral, Every Other Day  fluticasone, 2 spray, Nasal, Daily  hydrALAZINE, 50 mg, Oral, Q8H  insulin glargine, 90 Units, Subcutaneous, Nightly  insulin lispro, 15 Units, Subcutaneous, TID AC  insulin lispro, 2-7 Units, Subcutaneous, 4x Daily AC & at Bedtime  levothyroxine, 137 mcg, Oral, Q AM  pantoprazole, 40 mg, Oral, Q AM  pregabalin, 75 mg, Oral, BID  terazosin, 1 mg, Oral, Nightly  torsemide, 40 mg, Oral, BID      IV Meds:        Results Reviewed:   I have personally reviewed the results from the time of this admission to  11/10/2024 11:41 EST     Results from last 7 days   Lab Units 11/10/24  0309 11/09/24  0256 11/08/24  0352 11/06/24  0324 11/05/24  1813   SODIUM mmol/L 139 144 137   < > 141   POTASSIUM mmol/L 3.9 3.7 3.9   < > 4.4   CHLORIDE mmol/L 103 104 104   < > 111*   CO2 mmol/L 21.3* 21.0* 18.3*   < > 18.4*   BUN mg/dL 57* 51* 43*   < > 35*   CREATININE mg/dL 2.34* 2.27* 2.21*   < > 2.11*   CALCIUM mg/dL 8.4* 7.5* 7.7*   < > 8.5*   BILIRUBIN mg/dL  --   --   --   --  0.4   ALK PHOS U/L  --   --   --   --  111   ALT (SGPT) U/L  --   --   --   --  5   AST (SGOT) U/L  --   --   --   --  8   GLUCOSE mg/dL 108* 135* 202*   < > 154*    < > = values in this interval not displayed.       Estimated Creatinine Clearance: 20.3 mL/min (A) (by C-G formula based on SCr of 2.34 mg/dL (H)).    Results from last 7 days   Lab Units 11/10/24  0309 11/09/24  0256 11/08/24  0352 11/07/24  0354 11/06/24  1602   MAGNESIUM mg/dL  --  1.6 1.7  --  1.7   PHOSPHORUS mg/dL 5.7* 5.2* 4.7*   < >  --     < > = values in this interval not displayed.       Results from last 7 days   Lab Units 11/10/24  0309 11/09/24  0256 11/08/24  0352 11/07/24  0354 11/06/24  0324   URIC ACID mg/dL 10.4* 10.4* 10.0* 10.0* 8.8*       Results from last 7 days   Lab Units 11/10/24  0309 11/09/24  0256 11/08/24  0352 11/07/24  0354 11/06/24  0324   WBC 10*3/mm3 9.08 9.20 10.07 6.98 9.84   HEMOGLOBIN g/dL 9.4* 8.8* 8.7* 8.8* 7.3*   PLATELETS 10*3/mm3 389 358 335 316 307             Assessment / Plan     ASSESSMENT:  CKD stage IV, creatinine peaked to 3.3 due to VIPUL during recent hospitalization, recovered back down to 2.17 upon discharge, now with new baseline around 2.0-2.2.  The patient has volume excess, which has improved and the creatinine today 2.34, stable   volume excess,secondary to acute CHF exacerbation.  improving with diuretics  High anion gap metabolic acidosis, secondary to CKD, CO2 21.3  Acute exacerbation of CHF, Bumex was discontinued during last  hospitalization.  Echo on 8/26/2024 showed EF 61% with normal LVF but mild mitral valve regurgitation.  Now on torsemide 40 mg BID  Anemia with CKD, on iron supplements every other day, hemoglobin 9.4  Type II DM with CKD, poorly controlled, A1c 10.3 on 10/12/2024, managed by primary team  Hypertension with CKD, BP elevated, systolic component associate with volume excess, improved  UTI, management per primary team  Recent bilateral lower extremity cellulitis  Hyperphosphatemia associate with chronic kidney disease    PLAN:  Continue torsemide   Increase calcium acetate to 2668 mg TID  No objection to be discharged from renal standpoint, follow-up in the office within 1 to 2 weeks  Surveillance labs    I reviewed the chart and other providers notes, reviewed labs.  Discussed the case with the patient and she was good understanding  Copied text in this note has been reviewed and is accurate as of 11/10/24.       Thank you for involving us in the care of Halie Guidry.  Please feel free to call with any questions.    Chidi Lanier MD  11/10/24  11:41 [facility]    [facility]  [phone]    Please note that portions of this note were completed with a voice recognition program.

## 2024-11-10 NOTE — PLAN OF CARE
Goal Outcome Evaluation:  Plan of Care Reviewed With: patient      Pt pleasant, alert and oriented x 4. She did ask for a PRN pain pill with 2100 medication for her legs. Pt did have a large BM. 222ml was the result of her bladder scan.

## 2024-11-11 ENCOUNTER — READMISSION MANAGEMENT (OUTPATIENT)
Dept: CALL CENTER | Facility: HOSPITAL | Age: 84
End: 2024-11-11
Payer: MEDICARE

## 2024-11-11 VITALS
TEMPERATURE: 98.1 F | RESPIRATION RATE: 18 BRPM | DIASTOLIC BLOOD PRESSURE: 64 MMHG | WEIGHT: 203.04 LBS | HEIGHT: 65 IN | BODY MASS INDEX: 33.83 KG/M2 | OXYGEN SATURATION: 97 % | HEART RATE: 59 BPM | SYSTOLIC BLOOD PRESSURE: 159 MMHG

## 2024-11-11 LAB
ALBUMIN SERPL-MCNC: 3.3 G/DL (ref 3.5–5.2)
ANION GAP SERPL CALCULATED.3IONS-SCNC: 13 MMOL/L (ref 5–15)
BASOPHILS # BLD AUTO: 0.1 10*3/MM3 (ref 0–0.2)
BASOPHILS NFR BLD AUTO: 1.1 % (ref 0–1.5)
BUN SERPL-MCNC: 60 MG/DL (ref 8–23)
BUN/CREAT SERPL: 26.3 (ref 7–25)
CALCIUM SPEC-SCNC: 8.4 MG/DL (ref 8.6–10.5)
CHLORIDE SERPL-SCNC: 102 MMOL/L (ref 98–107)
CO2 SERPL-SCNC: 22 MMOL/L (ref 22–29)
CREAT SERPL-MCNC: 2.28 MG/DL (ref 0.57–1)
DEPRECATED RDW RBC AUTO: 48.2 FL (ref 37–54)
EGFRCR SERPLBLD CKD-EPI 2021: 20.7 ML/MIN/1.73
EOSINOPHIL # BLD AUTO: 0.49 10*3/MM3 (ref 0–0.4)
EOSINOPHIL NFR BLD AUTO: 5.4 % (ref 0.3–6.2)
ERYTHROCYTE [DISTWIDTH] IN BLOOD BY AUTOMATED COUNT: 15.3 % (ref 12.3–15.4)
GLUCOSE BLDC GLUCOMTR-MCNC: 160 MG/DL (ref 70–130)
GLUCOSE BLDC GLUCOMTR-MCNC: 262 MG/DL (ref 70–130)
GLUCOSE SERPL-MCNC: 168 MG/DL (ref 65–99)
HCT VFR BLD AUTO: 29.4 % (ref 34–46.6)
HGB BLD-MCNC: 9.4 G/DL (ref 12–15.9)
IMM GRANULOCYTES # BLD AUTO: 0.07 10*3/MM3 (ref 0–0.05)
IMM GRANULOCYTES NFR BLD AUTO: 0.8 % (ref 0–0.5)
LYMPHOCYTES # BLD AUTO: 1.03 10*3/MM3 (ref 0.7–3.1)
LYMPHOCYTES NFR BLD AUTO: 11.3 % (ref 19.6–45.3)
MAGNESIUM SERPL-MCNC: 1.8 MG/DL (ref 1.6–2.4)
MCH RBC QN AUTO: 27.6 PG (ref 26.6–33)
MCHC RBC AUTO-ENTMCNC: 32 G/DL (ref 31.5–35.7)
MCV RBC AUTO: 86.2 FL (ref 79–97)
MONOCYTES # BLD AUTO: 0.87 10*3/MM3 (ref 0.1–0.9)
MONOCYTES NFR BLD AUTO: 9.5 % (ref 5–12)
NEUTROPHILS NFR BLD AUTO: 6.58 10*3/MM3 (ref 1.7–7)
NEUTROPHILS NFR BLD AUTO: 71.9 % (ref 42.7–76)
NRBC BLD AUTO-RTO: 0 /100 WBC (ref 0–0.2)
PHOSPHATE SERPL-MCNC: 5.3 MG/DL (ref 2.5–4.5)
PLATELET # BLD AUTO: 400 10*3/MM3 (ref 140–450)
PMV BLD AUTO: 9.8 FL (ref 6–12)
POTASSIUM SERPL-SCNC: 4 MMOL/L (ref 3.5–5.2)
RBC # BLD AUTO: 3.41 10*6/MM3 (ref 3.77–5.28)
SODIUM SERPL-SCNC: 137 MMOL/L (ref 136–145)
URATE SERPL-MCNC: 10.9 MG/DL (ref 2.4–5.7)
WBC NRBC COR # BLD AUTO: 9.14 10*3/MM3 (ref 3.4–10.8)

## 2024-11-11 PROCEDURE — 84550 ASSAY OF BLOOD/URIC ACID: CPT | Performed by: INTERNAL MEDICINE

## 2024-11-11 PROCEDURE — 97535 SELF CARE MNGMENT TRAINING: CPT

## 2024-11-11 PROCEDURE — 82948 REAGENT STRIP/BLOOD GLUCOSE: CPT

## 2024-11-11 PROCEDURE — 85025 COMPLETE CBC W/AUTO DIFF WBC: CPT | Performed by: INTERNAL MEDICINE

## 2024-11-11 PROCEDURE — 63710000001 INSULIN LISPRO (HUMAN) PER 5 UNITS: Performed by: INTERNAL MEDICINE

## 2024-11-11 PROCEDURE — 83735 ASSAY OF MAGNESIUM: CPT | Performed by: INTERNAL MEDICINE

## 2024-11-11 PROCEDURE — 97116 GAIT TRAINING THERAPY: CPT

## 2024-11-11 PROCEDURE — 80069 RENAL FUNCTION PANEL: CPT | Performed by: INTERNAL MEDICINE

## 2024-11-11 RX ORDER — TORSEMIDE 20 MG/1
40 TABLET ORAL 2 TIMES DAILY
Qty: 120 TABLET | Refills: 0 | Status: SHIPPED | OUTPATIENT
Start: 2024-11-11 | End: 2024-12-11

## 2024-11-11 RX ADMIN — CALCIUM ACETATE 2001 MG: 667 CAPSULE ORAL at 08:29

## 2024-11-11 RX ADMIN — HYDRALAZINE HYDROCHLORIDE 50 MG: 50 TABLET ORAL at 13:23

## 2024-11-11 RX ADMIN — FERROUS SULFATE TAB 325 MG (65 MG ELEMENTAL FE) 325 MG: 325 (65 FE) TAB at 08:29

## 2024-11-11 RX ADMIN — HYDRALAZINE HYDROCHLORIDE 50 MG: 50 TABLET ORAL at 05:00

## 2024-11-11 RX ADMIN — INSULIN LISPRO 2 UNITS: 100 INJECTION, SOLUTION INTRAVENOUS; SUBCUTANEOUS at 11:57

## 2024-11-11 RX ADMIN — INSULIN LISPRO 4 UNITS: 100 INJECTION, SOLUTION INTRAVENOUS; SUBCUTANEOUS at 08:29

## 2024-11-11 RX ADMIN — INSULIN LISPRO 15 UNITS: 100 INJECTION, SOLUTION INTRAVENOUS; SUBCUTANEOUS at 11:57

## 2024-11-11 RX ADMIN — DULOXETINE HYDROCHLORIDE 30 MG: 30 CAPSULE, DELAYED RELEASE ORAL at 08:29

## 2024-11-11 RX ADMIN — BISOPROLOL FUMARATE 5 MG: 5 TABLET, FILM COATED ORAL at 08:28

## 2024-11-11 RX ADMIN — HYDROCODONE BITARTRATE AND ACETAMINOPHEN 1 TABLET: 5; 325 TABLET ORAL at 06:51

## 2024-11-11 RX ADMIN — PREGABALIN 75 MG: 75 CAPSULE ORAL at 08:29

## 2024-11-11 RX ADMIN — LEVOTHYROXINE SODIUM 137 MCG: 137 TABLET ORAL at 05:00

## 2024-11-11 RX ADMIN — PANTOPRAZOLE SODIUM 40 MG: 40 TABLET, DELAYED RELEASE ORAL at 05:00

## 2024-11-11 RX ADMIN — INSULIN LISPRO 15 UNITS: 100 INJECTION, SOLUTION INTRAVENOUS; SUBCUTANEOUS at 08:30

## 2024-11-11 RX ADMIN — TORSEMIDE 40 MG: 20 TABLET ORAL at 08:28

## 2024-11-11 RX ADMIN — CALCIUM ACETATE 2001 MG: 667 CAPSULE ORAL at 11:57

## 2024-11-11 NOTE — PLAN OF CARE
Goal Outcome Evaluation:  Plan of Care Reviewed With: patient        Progress: improving  Outcome Evaluation: pt seen for therapy this AM, pt on BSC on entry ready to get up. She requires some assist for posterior hygiene as well as brief mgment this date. CGA for static standing throughout ADL tasks. She demo's STS this date with CGA from BSC and EOB, demo'ing x2 functional mobility trials with rwx with CGA. Improvements noted toward goals, plans to return to [facility], reports overall BLE feeling better and eager for d/c. continue to follow    Anticipated Discharge Disposition (OT): assisted living

## 2024-11-11 NOTE — THERAPY TREATMENT NOTE
Patient Name: Halie Guidry  : 1940    MRN: [id number]                              Today's Date: 2024       Admit Date: 2024    Visit Dx:     ICD-10-CM ICD-9-CM   1. Acute on chronic congestive heart failure, unspecified heart failure type  I50.9 428.0   2. Bilateral leg edema  R60.0 782.3     Patient Active Problem List   Diagnosis    Compression fracture    Lumbar degenerative disc disease    Type 2 diabetes mellitus with hyperglycemia, with long-term current use of insulin    Hyperlipidemia    Benign essential hypertension    Primary hypothyroidism    Neuropathy    Osteoporosis    Proteinuria    Tobacco abuse    Generalized weakness    Spinal stenosis    Scoliosis    Arthritis    VBI (vertebrobasilar insufficiency)    Orthostatic hypotension    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    Severe hypothyroidism    Noncompliance with medication regimen    Vitamin D deficiency disease    Tremor    Seizure    Thoracic degenerative disc disease    Acute kidney injury (VIPUL) with acute tubular necrosis (ATN)    Hyperglycemia    Type II diabetes mellitus, uncontrolled    Lower abdominal pain    Transaminitis    Emphysematous cystitis    Choledocholithiasis    Hypokalemia    Hypoxia    Generalized abdominal pain    History of Clostridium difficile infection    History of ERCP    Hypomagnesemia    Anxiety disorder    Neuropathic pain    Intertrigo    Weakness of both lower extremities    Accelerated hypertension    Acute cystitis without hematuria    Left lower lobe pneumonia    Cellulitis and abscess of left lower extremity    Benign hypertension with CKD (chronic kidney disease) stage IV    Peripheral edema    Primary malignant neuroendocrine tumor of pancreas    Encounter for long-term (current) use of high-risk medication    Diabetic foot ulcer    Stage 3a chronic kidney disease    Pressure injury of buttock, stage 2    HTN (hypertension)    Anemia due to chronic kidney disease     Bilateral lower extremity edema    Cellulitis of right lower extremity    CKD (chronic kidney disease) stage 4, GFR 15-29 ml/min    Acute CHF    Bilateral cellulitis of lower leg    Acute UTI    UTI (urinary tract infection)    Acute UTI (urinary tract infection)    Metabolic acidosis    Cobalamin deficiency    Dependent edema    Gastroesophageal reflux disease    Mild neurocognitive disorder    Pancreatic neoplasm    Congestive heart failure    CHF exacerbation    Acute exacerbation of CHF (congestive heart failure)     Past Medical History:   Diagnosis Date    Acute metabolic encephalopathy 06/24/2022    Anxiety     Arthritis     Benign essential hypertension 08/20/2014    Bleeding disorder     Depression     Diabetes     Diabetes mellitus, type 2     Disc degeneration, lumbar     Headache, tension-type     Hyperlipidemia     Hypothyroidism     Neuropathy     Osteoporosis 09/09/2015    Pancreatic mass     Sept 2023, on Monthly Octreotide Injection    Peripheral neuropathy     Rotator cuff tear, left     Scoliosis     Shoulder pain     LEFT, TORN ROTATOR CUFF S/P FALL    Spinal stenosis      Past Surgical History:   Procedure Laterality Date    APPENDECTOMY      BILATERAL BREAST REDUCTION Bilateral 08/2015    CATARACT EXTRACTION  03/2015    CHOLECYSTECTOMY WITH INTRAOPERATIVE CHOLANGIOGRAM N/A 3/27/2022    Procedure: CHOLECYSTECTOMY LAPAROSCOPIC INTRAOPERATIVE CHOLANGIOGRAM;  Surgeon: Aiyana Hill MD;  Location: [facility] MAIN OR;  Service: General;  Laterality: N/A;    COLONOSCOPY  06/05/2015    WNL    ERCP N/A 2/25/2022    Procedure: ENDOSCOPIC RETROGRADE CHOLANGIOPANCREATOGRAPHY with sphincterotomy and balloon sweep;  Surgeon: Chilo Wilhelm MD;  Location: [facility] ENDOSCOPY;  Service: Gastroenterology;  Laterality: N/A;  PRE/POST - CBD stones    ERCP N/A 3/28/2022    Procedure: ENDOSCOPIC RETROGRADE CHOLANGIOPANCREATOGRAPHY WITH SPHINCTEROTOMY AND BALLOON SWEEP;  Surgeon: Chilo Wilhelm MD;   Location: [facility] ENDOSCOPY;  Service: Gastroenterology;  Laterality: N/A;  PRE: COMMON DUCT STONE  POST: COMMON DUCT STONE    KYPHOPLASTY      REDUCTION MAMMAPLASTY      TONSILLECTOMY        General Information       Row Name 11/11/24 1206          OT Time and Intention    Document Type therapy note (daily note)  -MM     Mode of Treatment occupational therapy  -MM     Patient Effort good  -MM     Symptoms Noted During/After Treatment none  -MM       Row Name 11/11/24 1206          General Information    Patient Profile Reviewed yes  -MM     Existing Precautions/Restrictions fall  -MM       Row Name 11/11/24 1206          Cognition    Orientation Status (Cognition) oriented x 4  -MM       Row Name 11/11/24 1206          Safety Issues/Impairments Affecting Functional Mobility    Impairments Affecting Function (Mobility) strength;endurance/activity tolerance;pain  -MM               User Key  (r) = Recorded By, (t) = Taken By, (c) = Cosigned By      Initials Name Provider Type    Arcelia Ahmadi OT Occupational Therapist                     Mobility/ADL's       Row Name 11/11/24 1206          Bed Mobility    Supine-Sit [place] (Bed Mobility) not tested  -MM     Sit-Supine [place] (Bed Mobility) contact guard  -MM     Comment, (Bed Mobility) on BSC on entry  -MM       Row Name 11/11/24 1206          Transfers    Transfers sit-stand transfer  -MM     Comment, (Transfers) STS from BSC and EOB this date, SBA/CGA  -MM       Row Name 11/11/24 1206          Sit-Stand Transfer    Sit-Stand [place] (Transfers) standby assist;contact guard  -MM     Assistive Device (Sit-Stand Transfers) walker, front-wheeled  -MM     Comment, (Sit-Stand Transfer) bed elevated  -MM       Row Name 11/11/24 1206          Functional Mobility    Functional Mobility- Ind. Level contact guard assist  -MM     Functional Mobility- Device walker, front-wheeled  -MM     Functional Mobility- Comment x2 functional mobility trials completed  this date  -MM       Row Name 11/11/24 1206          Activities of Daily Living    BADL Assessment/Intervention lower body dressing;toileting  -MM       Row Name 11/11/24 1206          Lower Body Dressing Assessment/Training    Comment, (Lower Body Dressing) assist with brief and donning shoes this date min-mod A  -MM       Row Name 11/11/24 1206          Toileting Assessment/Training    Comment, (Toileting) requires posteiror marquez care assist in static standing, CGA for balance with rwx support  -MM               User Key  (r) = Recorded By, (t) = Taken By, (c) = Cosigned By      Initials Name Provider Type    MM Arcelia Hoyos OT Occupational Therapist                   Obj/Interventions       Row Name 11/11/24 1209          Balance    Balance Assessment sitting static balance;sitting dynamic balance;sit to stand dynamic balance;standing static balance;standing dynamic balance  -MM     Static Sitting Balance standby assist  -MM     Dynamic Sitting Balance standby assist  -MM     Sit to Stand Dynamic Balance contact guard  -MM     Static Standing Balance contact guard  -MM     Dynamic Standing Balance contact guard  -MM     Position/Device Used, Standing Balance supported;walker, front-wheeled  -MM     Comment, Balance no overt LOBs, reports mild pain in BLE this date  -MM               User Key  (r) = Recorded By, (t) = Taken By, (c) = Cosigned By      Initials Name Provider Type    Arcelia Ahmadi OT Occupational Therapist                   Goals/Plan    No documentation.                  Clinical Impression       Row Name 11/11/24 1209          Pain Assessment    Pretreatment Pain Rating 2/10  -MM     Posttreatment Pain Rating 2/10  -MM     Pain Location extremity  -MM     Pain Side/Orientation bilateral;lower  -MM       Row Name 11/11/24 1209          Plan of Care Review    Plan of Care Reviewed With patient  -MM     Progress improving  -MM     Outcome Evaluation pt seen for therapy this AM, pt on BSC on  entry ready to get up. She requires some assist for posterior hygiene as well as brief mgment this date. CGA for static standing throughout ADL tasks. She demo's STS this date with CGA from BSC and EOB, demo'ing x2 functional mobility trials with rwx with CGA. Improvements noted toward goals, plans to return to [facility], reports overall BLE feeling better and eager for d/c. continue to follow  -MM       Row Name 11/11/24 1209          Therapy Plan Review/Discharge Plan (OT)    Anticipated Discharge Disposition (OT) assisted living  -MM       Row Name 11/11/24 1209          Vital Signs    O2 Delivery Pre Treatment room air  -MM       Row Name 11/11/24 1209          Positioning and Restraints    Pre-Treatment Position bedside commode  -MM     Post Treatment Position bed  -MM     In Bed notified nsg;fowlers;call light within reach;encouraged to call for assist;exit alarm on;side rails up x3  -MM               User Key  (r) = Recorded By, (t) = Taken By, (c) = Cosigned By      Initials Name Provider Type    Arcelia Ahmadi, TOMÁS Occupational Therapist                   Outcome Measures       Row Name 11/11/24 1211          How much help from another is currently needed...    Putting on and taking off regular lower body clothing? 2  -MM     Bathing (including washing, rinsing, and drying) 2  -MM     Toileting (which includes using toilet bed pan or urinal) 2  -MM     Putting on and taking off regular upper body clothing 3  -MM     Taking care of personal grooming (such as brushing teeth) 4  -MM     Eating meals 4  -MM     AM-PAC 6 Clicks Score (OT) 17  -MM       Row Name 11/11/24 0800          How much help from another person do you currently need...    Turning from your back to your side while in flat bed without using bedrails? 3  -KA     Moving from lying on back to sitting on the side of a flat bed without bedrails? 3  -KA     Moving to and from a bed to a chair (including a wheelchair)? 2  -KA     Standing up from a  chair using your arms (e.g., wheelchair, bedside chair)? 2  -KA     Climbing 3-5 steps with a railing? 1  -KA     To walk in hospital room? 2  -KA     AM-PAC 6 Clicks Score (PT) 13  -KA       Row Name 11/11/24 1211          Functional Assessment    Outcome Measure Options AM-PAC 6 Clicks Daily Activity (OT)  -               User Key  (r) = Recorded By, (t) = Taken By, (c) = Cosigned By      Initials Name Provider Type    Mary Lopez, RN Registered Nurse    Arcelia Ahmadi OT Occupational Therapist                    Occupational Therapy Education       Title: PT OT SLP Therapies (In Progress)       Topic: Occupational Therapy (Done)       Point: ADL training (Done)       Description:   Instruct learner(s) on proper safety adaptation and remediation techniques during self care or transfers.   Instruct in proper use of assistive devices.                  Learning Progress Summary            Patient Acceptance, E, VU by AUGUSTUS at 11/6/2024 1221    Comment: role of OT                      Point: Precautions (Done)       Description:   Instruct learner(s) on prescribed precautions during self-care and functional transfers.                  Learning Progress Summary            Patient Acceptance, E, VU by AUGUSTUS at 11/6/2024 1221    Comment: role of OT                      Point: Body mechanics (Done)       Description:   Instruct learner(s) on proper positioning and spine alignment during self-care, functional mobility activities and/or exercises.                  Learning Progress Summary            Patient Acceptance, E, VU by AUGUSTUS at 11/6/2024 1221    Comment: role of OT                                      User Key       Initials Effective Dates Name Provider Type [facility] 05/31/24 -  Arcelia Hoyos OT Occupational Therapist OT                  OT Recommendation and Plan  Planned Therapy Interventions (OT): activity tolerance training, BADL retraining, neuromuscular control/coordination retraining,  patient/caregiver education/training, transfer/mobility retraining, strengthening exercise, ROM/therapeutic exercise, occupation/activity based interventions, functional balance retraining  Therapy Frequency (OT): 5 times/wk  Plan of Care Review  Plan of Care Reviewed With: patient  Progress: improving  Outcome Evaluation: pt seen for therapy this AM, pt on C on entry ready to get up. She requires some assist for posterior hygiene as well as brief mgment this date. CGA for static standing throughout ADL tasks. She demo's STS this date with CGA from [facility] and EOB, demo'ing x2 functional mobility trials with rwx with CGA. Improvements noted toward goals, plans to return to [facility], reports overall BLE feeling better and eager for d/c. continue to follow     Time Calculation:   Evaluation Complexity (OT)  Review Occupational Profile/Medical/Therapy History Complexity: expanded/moderate complexity  Assessment, Occupational Performance/Identification of Deficit Complexity: 3-5 performance deficits  Clinical Decision Making Complexity (OT): detailed assessment/moderate complexity  Overall Complexity of Evaluation (OT): moderate complexity     Time Calculation- OT       Row Name 11/11/24 1212             Time Calculation- OT    OT Start Time 1046  -MM      OT Stop Time 1100  -MM      OT Time Calculation (min) 14 min  -MM      Total Timed Code Minutes- OT 14 minute(s)  -MM      OT Received On 11/11/24  -MM      OT - Next Appointment 11/12/24  -MM         Timed Charges    33704 - OT Self Care/Mgmt Minutes 14  -MM         Total Minutes    Timed Charges Total Minutes 14  -MM       Total Minutes 14  -MM                User Key  (r) = Recorded By, (t) = Taken By, (c) = Cosigned By      Initials Name Provider Type    Arcelia Ahmadi OT Occupational Therapist                  Therapy Charges for Today       Code Description Service Date Service Provider Modifiers Qty    [id number]  OT SELF CARE/MGMT/TRAIN EA 15 MIN 11/11/2024  Arcelia Hoyos, TOMÁS GO 1                 Arcelia Hoyos OT  11/11/2024

## 2024-11-11 NOTE — PROGRESS NOTES
[facility] Progress Note      Patient Name: Halie Guidry  : 1940  MRN: [id number]  Primary Care Physician:  Napoleon Mead MD  Date of admission: 2024    Subjective     Interval History:   Follow-up chronic kidney disease    Patient is feeling the same. Denies any chest pain, nausea, or vomiting, although leg swelling is better today.  Continues to have good urine output with diuretics.    Review of Systems:   As noted above    Objective     Vitals:   Temp:  [97.9 °F (36.6 °C)-98.2 °F (36.8 °C)] 98.1 °F (36.7 °C)  Heart Rate:  [60-71] 71  Resp:  [18] 18  BP: (159-185)/(46-60) 159/58    Intake/Output Summary (Last 24 hours) at 2024 0859  Last data filed at 2024 0508  Gross per 24 hour   Intake --   Output 1400 ml   Net -1400 ml       Physical Exam:    General Appearance: awake, chronically ill, no acute distress   Skin: warm and dry  HEENT: oral mucosa normal, nonicteric sclera  Neck: No JVD  Lungs: Clear on auscultation, breathing effort not labored  Heart: RRR, no rub  Abdomen: soft, nontender, nondistended  : no palpable bladder  Extremities: Trace lower extremity edema, legs wrapped with gauze  Neuro: normal speech and mental status     Scheduled Meds:     atorvastatin, 80 mg, Oral, Nightly  bisoprolol, 5 mg, Oral, Daily  calcium acetate, 2,001 mg, Oral, TID With Meals  DULoxetine, 30 mg, Oral, Daily  ferrous sulfate, 325 mg, Oral, Every Other Day  fluticasone, 2 spray, Nasal, Daily  hydrALAZINE, 50 mg, Oral, Q8H  insulin glargine, 90 Units, Subcutaneous, Nightly  insulin lispro, 15 Units, Subcutaneous, TID AC  insulin lispro, 2-7 Units, Subcutaneous, 4x Daily AC & at Bedtime  levothyroxine, 137 mcg, Oral, Q AM  pantoprazole, 40 mg, Oral, Q AM  pregabalin, 75 mg, Oral, BID  terazosin, 1 mg, Oral, Nightly  torsemide, 40 mg, Oral, BID      IV Meds:        Results Reviewed:   I have personally reviewed the results from the time of this admission to  11/11/2024 08:59 EST     Results from last 7 days   Lab Units 11/11/24  0445 11/10/24  0309 11/09/24  0256 11/06/24  0324 11/05/24  1813   SODIUM mmol/L 137 139 144   < > 141   POTASSIUM mmol/L 4.0 3.9 3.7   < > 4.4   CHLORIDE mmol/L 102 103 104   < > 111*   CO2 mmol/L 22.0 21.3* 21.0*   < > 18.4*   BUN mg/dL 60* 57* 51*   < > 35*   CREATININE mg/dL 2.28* 2.34* 2.27*   < > 2.11*   CALCIUM mg/dL 8.4* 8.4* 7.5*   < > 8.5*   BILIRUBIN mg/dL  --   --   --   --  0.4   ALK PHOS U/L  --   --   --   --  111   ALT (SGPT) U/L  --   --   --   --  5   AST (SGOT) U/L  --   --   --   --  8   GLUCOSE mg/dL 168* 108* 135*   < > 154*    < > = values in this interval not displayed.       Estimated Creatinine Clearance: 20.6 mL/min (A) (by C-G formula based on SCr of 2.28 mg/dL (H)).    Results from last 7 days   Lab Units 11/11/24  0445 11/10/24  0309 11/09/24  0256 11/08/24  0352   MAGNESIUM mg/dL 1.8  --  1.6 1.7   PHOSPHORUS mg/dL 5.3* 5.7* 5.2* 4.7*       Results from last 7 days   Lab Units 11/11/24  0445 11/10/24  0309 11/09/24  0256 11/08/24  0352 11/07/24  0354 11/06/24  0324   URIC ACID mg/dL 10.9* 10.4* 10.4* 10.0* 10.0* 8.8*       Results from last 7 days   Lab Units 11/11/24  0445 11/10/24  0309 11/09/24  0256 11/08/24  0352 11/07/24  0354   WBC 10*3/mm3 9.14 9.08 9.20 10.07 6.98   HEMOGLOBIN g/dL 9.4* 9.4* 8.8* 8.7* 8.8*   PLATELETS 10*3/mm3 400 389 358 335 316             Assessment / Plan     ASSESSMENT:  CKD stage IV, creatinine peaked to 3.3 due to VIPUL during recent hospitalization, recovered back down to 2.17 upon discharge, now with new baseline around 2.0-2.2.  The patient has volume excess, which has improved and the creatinine today 2.28, stable  volume excess,secondary to acute CHF exacerbation.  improving with diuretics  High anion gap metabolic acidosis, secondary to CKD, CO2 21.3  Acute exacerbation of CHF, Bumex was discontinued during last hospitalization.  Echo on 8/26/2024 showed EF 61% with normal LVF  but mild mitral valve regurgitation.  Now on torsemide 40 mg BID  Anemia with CKD, on iron supplements every other day, hemoglobin 9.4  Type II DM with CKD, poorly controlled, A1c 10.3 on 10/12/2024, managed by primary team  Hypertension with CKD, BP elevated, systolic component associate with volume excess, improved  UTI, management per primary team  Recent bilateral lower extremity cellulitis  Hyperphosphatemia associate with chronic kidney disease    PLAN:  Continue torsemide   Continue phosphate binder  No objection to be discharged from renal standpoint, follow-up in the office within 1 to 2 weeks    I reviewed the chart and other providers notes, reviewed labs.  Discussed the case with the patient and she was good understanding  Copied text in this note has been reviewed and is accurate as of 11/11/24.       Thank you for involving us in the care of Halie Guidry.  Please feel free to call with any questions.    Chidi Lanier MD  11/11/24  08:59 RUST    [facility]  [phone]    Please note that portions of this note were completed with a voice recognition program.

## 2024-11-11 NOTE — CASE MANAGEMENT/SOCIAL WORK
Continued Stay Note  [facility]     Patient Name: Halie Guidry  MRN: [id number]  Today's Date: 11/11/2024    Admit Date: 11/5/2024    Plan: [facility] with [facility].   Discharge Plan       Row Name 11/11/24 0817       Plan    Plan [facility] with [facility].    Patient/Family in Agreement with Plan yes    Plan Comments Spoke with Tete/CMT with [facility] stated that patient can return if that is what patient and son are requesting. Patient is current with [facility], referral placed and accepted. Discharge plan is for patient to return to [facility] with [facility], will need transport. Spoke with patient at bedside. Introduced self. Patient states that she plans to return to [facility] with [facility].                   Discharge Codes    No documentation.                 Expected Discharge Date and Time       Expected Discharge Date Expected Discharge Time    Nov 9, 2024               Maribeth Khan RN

## 2024-11-11 NOTE — DISCHARGE SUMMARY
Date of Discharge:  11/11/2024    PCP: Napoleon Mead MD    Discharge Diagnosis:   Active Hospital Problems    Diagnosis  POA    **CHF exacerbation [I50.9]  Yes    Acute exacerbation of CHF (congestive heart failure) [I50.9]  Yes      Resolved Hospital Problems   No resolved problems to display.          Consults       Date and Time Order Name Status Description    11/5/2024 10:16 PM Inpatient Nephrology Consult Completed     10/12/2024 12:58 PM Inpatient Infectious Diseases Consult Completed     10/11/2024 10:51 AM Inpatient Nephrology Consult Completed           Hospital Course  84 y.o. female with past medical history significant for type 2 diabetes mellitus, degenerative disc disease, dyslipidemia, presented to the hospital with acute on chronic congestive heart failure, patient was initially on Bumex, but now has been switched to oral torsemide, nephrology on board has been managing diuretics, she has responded well to the treatment.  Patient is clinically improved to a point where they have cleared her for discharge home.  I have seen and examined patient at bedside, total time spent on discharge management for this patient is more than 30 minutes.  Plan of care was discussed with the patient and she has verbalized understanding.  She will follow-up with PCP and nephrology on outpatient basis.        Temp:  [97.9 °F (36.6 °C)-98.1 °F (36.7 °C)] 98.1 °F (36.7 °C)  Heart Rate:  [59-71] 59  Resp:  [18] 18  BP: (159-183)/(46-64) 159/64  Body mass index is 33.79 kg/m².    Physical Exam  General, awake and alert.  Head and ENT, normocephalic and atraumatic.  Lungs, symmetric expansion, equal air entry bilaterally.  Heart, regular rate and rhythm.  Abdomen, soft and nontender.  Extremities, no clubbing or cyanosis.  Neuro, no focal deficits.  Skin: Warm and no rash.  Psych, normal mood and affect.  Musculoskeletal, joint examination is grossly normal.      Disposition: Home or Self Care       Discharge Medications         New Medications        Instructions Start Date   Torsemide 40 MG tablet   40 mg, Oral, 2 Times Daily             Continue These Medications        Instructions Start Date   acetaminophen 325 MG tablet  Commonly known as: TYLENOL   650 mg, Oral, Every 6 Hours PRN      ammonium lactate 12 % cream  Commonly known as: AMLACTIN   1 Application, Topical, 2 Times Daily      atorvastatin 80 MG tablet  Commonly known as: LIPITOR   80 mg, Oral, Nightly      BD Pen Needle Naila 2nd Gen 32G X 4 MM misc  Generic drug: Insulin Pen Needle   Use 1 pen needle 4 (Four) Times a Day.      bisoprolol 5 MG tablet  Commonly known as: ZEBeta   5 mg, Oral, Daily      DULoxetine 30 MG capsule  Commonly known as: CYMBALTA   30 mg, Oral, Daily      ferrous sulfate 325 (65 FE) MG tablet   325 mg, Oral, Every Other Day      fluticasone 50 MCG/ACT nasal spray  Commonly known as: FLONASE   2 sprays, Nasal, Daily      HumaLOG KwikPen 100 UNIT/ML solution pen-injector  Generic drug: Insulin Lispro (1 Unit Dial)   15-20 Units, Subcutaneous, 3 Times Daily Before Meals      hydrALAZINE 50 MG tablet  Commonly known as: APRESOLINE   50 mg, Oral, Every 8 Hours Scheduled      hydrocortisone-bacitracin-zinc oxide-nystatin  Commonly known as: MAGIC BARRIER   1 Application, Topical, 2 Times Daily      lansoprazole 30 MG capsule  Commonly known as: PREVACID   30 mg, Oral, Daily      levothyroxine 137 MCG tablet  Commonly known as: SYNTHROID, LEVOTHROID   137 mcg, Oral, Every Early Morning      octreotide 30 MG injection  Commonly known as: sandoSTATIN   30 mg, Every 28 Days      pregabalin 75 MG capsule  Commonly known as: LYRICA   75 mg, Oral, 2 Times Daily      terazosin 1 MG capsule  Commonly known as: HYTRIN   1 mg, Oral, Nightly      Toujeo Max SoloStar 300 UNIT/ML solution pen-injector injection  Generic drug: Insulin Glargine (2 Unit Dial)   90 Units, Subcutaneous, Daily                 Contact information for follow-up providers       [place]  MD Napoleon .    Specialty: Family Medicine  Contact information:  [address]  [address]  [facility] [address]  [phone]                       Contact information for after-discharge care       Home Medical Care       [facility] [facility]    Service: Home Health Services  Contact information:  [address], [address]  [facility] [address] [phone]                                  Future Appointments   Date Time Provider Department Center   12/3/2024 11:30 AM NAY PET ADMIN RM 1 BH NAY PET NAY   12/3/2024 12:45 PM NAY PET 1 BH NAY PET NAY   12/9/2024 12:30 PM LAB CHAIR 6 [facility]  LAB [facility] [place]   12/9/2024  1:00 PM Napoleon Gutierrez MD [facility] CBC [facility] [place]   12/9/2024  1:30 PM INJECTION CHAIR [facility] KRE BH INFUS KRE [facility]AG   12/10/2024  2:00 PM Lilia Orona APRN MG[facility] EN  NAY        Eddie Vela MD  [facility]  11/11/24    Discharge time spent greater than 30 minutes.

## 2024-11-11 NOTE — PLAN OF CARE
Goal Outcome Evaluation:  Plan of Care Reviewed With: patient      Pt alert and oriented x 4. Wound care performed per order. PRN pain medication administered with 2100 med pass as per MAR for discomfort in BLE. 1944 183/60.

## 2024-11-11 NOTE — THERAPY TREATMENT NOTE
Patient Name: Halie Guidry  : 1940    MRN: [id number]                              Today's Date: 2024       Admit Date: 2024    Visit Dx:     ICD-10-CM ICD-9-CM   1. Acute on chronic congestive heart failure, unspecified heart failure type  I50.9 428.0   2. Bilateral leg edema  R60.0 782.3     Patient Active Problem List   Diagnosis    Compression fracture    Lumbar degenerative disc disease    Type 2 diabetes mellitus with hyperglycemia, with long-term current use of insulin    Hyperlipidemia    Benign essential hypertension    Primary hypothyroidism    Neuropathy    Osteoporosis    Proteinuria    Tobacco abuse    Generalized weakness    Spinal stenosis    Scoliosis    Arthritis    VBI (vertebrobasilar insufficiency)    Orthostatic hypotension    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    Severe hypothyroidism    Noncompliance with medication regimen    Vitamin D deficiency disease    Tremor    Seizure    Thoracic degenerative disc disease    Acute kidney injury (VIPUL) with acute tubular necrosis (ATN)    Hyperglycemia    Type II diabetes mellitus, uncontrolled    Lower abdominal pain    Transaminitis    Emphysematous cystitis    Choledocholithiasis    Hypokalemia    Hypoxia    Generalized abdominal pain    History of Clostridium difficile infection    History of ERCP    Hypomagnesemia    Anxiety disorder    Neuropathic pain    Intertrigo    Weakness of both lower extremities    Accelerated hypertension    Acute cystitis without hematuria    Left lower lobe pneumonia    Cellulitis and abscess of left lower extremity    Benign hypertension with CKD (chronic kidney disease) stage IV    Peripheral edema    Primary malignant neuroendocrine tumor of pancreas    Encounter for long-term (current) use of high-risk medication    Diabetic foot ulcer    Stage 3a chronic kidney disease    Pressure injury of buttock, stage 2    HTN (hypertension)    Anemia due to chronic kidney disease     Bilateral lower extremity edema    Cellulitis of right lower extremity    CKD (chronic kidney disease) stage 4, GFR 15-29 ml/min    Acute CHF    Bilateral cellulitis of lower leg    Acute UTI    UTI (urinary tract infection)    Acute UTI (urinary tract infection)    Metabolic acidosis    Cobalamin deficiency    Dependent edema    Gastroesophageal reflux disease    Mild neurocognitive disorder    Pancreatic neoplasm    Congestive heart failure    CHF exacerbation    Acute exacerbation of CHF (congestive heart failure)     Past Medical History:   Diagnosis Date    Acute metabolic encephalopathy 06/24/2022    Anxiety     Arthritis     Benign essential hypertension 08/20/2014    Bleeding disorder     Depression     Diabetes     Diabetes mellitus, type 2     Disc degeneration, lumbar     Headache, tension-type     Hyperlipidemia     Hypothyroidism     Neuropathy     Osteoporosis 09/09/2015    Pancreatic mass     Sept 2023, on Monthly Octreotide Injection    Peripheral neuropathy     Rotator cuff tear, left     Scoliosis     Shoulder pain     LEFT, TORN ROTATOR CUFF S/P FALL    Spinal stenosis      Past Surgical History:   Procedure Laterality Date    APPENDECTOMY      BILATERAL BREAST REDUCTION Bilateral 08/2015    CATARACT EXTRACTION  03/2015    CHOLECYSTECTOMY WITH INTRAOPERATIVE CHOLANGIOGRAM N/A 3/27/2022    Procedure: CHOLECYSTECTOMY LAPAROSCOPIC INTRAOPERATIVE CHOLANGIOGRAM;  Surgeon: Aiyana Hill MD;  Location: [facility] MAIN OR;  Service: General;  Laterality: N/A;    COLONOSCOPY  06/05/2015    WNL    ERCP N/A 2/25/2022    Procedure: ENDOSCOPIC RETROGRADE CHOLANGIOPANCREATOGRAPHY with sphincterotomy and balloon sweep;  Surgeon: Chilo Wilhelm MD;  Location: [facility] ENDOSCOPY;  Service: Gastroenterology;  Laterality: N/A;  PRE/POST - CBD stones    ERCP N/A 3/28/2022    Procedure: ENDOSCOPIC RETROGRADE CHOLANGIOPANCREATOGRAPHY WITH SPHINCTEROTOMY AND BALLOON SWEEP;  Surgeon: Chilo Wilhelm MD;   Location: [facility] ENDOSCOPY;  Service: Gastroenterology;  Laterality: N/A;  PRE: COMMON DUCT STONE  POST: COMMON DUCT STONE    KYPHOPLASTY      REDUCTION MAMMAPLASTY      TONSILLECTOMY        General Information       Row Name 11/11/24 1401          Physical Therapy Time and Intention    Document Type therapy note (daily note)  -     Mode of Treatment physical therapy  -EB       Row Name 11/11/24 1401          General Information    Patient Profile Reviewed yes  -EB     Existing Precautions/Restrictions fall  -EB       Row Name 11/11/24 1401          Cognition    Orientation Status (Cognition) oriented x 4  -EB       Row Name 11/11/24 1401          Safety Issues/Impairments Affecting Functional Mobility    Impairments Affecting Function (Mobility) endurance/activity tolerance;strength;pain  -               User Key  (r) = Recorded By, (t) = Taken By, (c) = Cosigned By      Initials Name Provider Type    EB Tita Power PTA Physical Therapist Assistant                   Mobility       Row Name 11/11/24 1401          Bed Mobility    Supine-Sit [place] (Bed Mobility) not tested  -     Sit-Supine [place] (Bed Mobility) contact guard;verbal cues  -     Assistive Device (Bed Mobility) bed rails;head of bed elevated  -       Row Name 11/11/24 1401          Sit-Stand Transfer    Sit-Stand [place] (Transfers) standby assist;contact guard  -     Assistive Device (Sit-Stand Transfers) walker, front-wheeled  -       Row Name 11/11/24 1401          Gait/Stairs (Locomotion)    [place] Level (Gait) contact guard;standby assist  -     Assistive Device (Gait) walker, front-wheeled  -EB     Distance in Feet (Gait) 30  8ft then 20ft  -EB     Deviations/Abnormal Patterns (Gait) gait speed decreased;stride length decreased;tess decreased  -EB     Bilateral Gait Deviations forward flexed posture;heel strike decreased  -EB     Comment, (Gait/Stairs) seated rest break after 8ft then walked another  20ft. No unsteadiness or LOB noted. slow gait pace.  -EB               User Key  (r) = Recorded By, (t) = Taken By, (c) = Cosigned By      Initials Name Provider Type    Tita Escalera PTA Physical Therapist Assistant                   Obj/Interventions    No documentation.                  Goals/Plan    No documentation.                  Clinical Impression       Row Name 11/11/24 1404          Pain    Pretreatment Pain Rating 2/10  -EB     Posttreatment Pain Rating 2/10  -EB     Pain Location extremity  -EB     Pain Side/Orientation bilateral;lower  -EB       Row Name 11/11/24 1404          Plan of Care Review    Plan of Care Reviewed With patient  -EB     Progress improving  -EB     Outcome Evaluation Pt seen for PT tx today. Pt is improving with mobility. Pt required CGA/SBA with stands from commode and EOB. Pt ambulated 10ft, seated rest break then 20ft with rwx, CGA/SBA. Slow gait pace and flexed posture but no unsteadiness or LOB noted. Will continue to follow and progress pt as able.  -EB       Row Name 11/11/24 1404          Therapy Assessment/Plan (PT)    Therapy Frequency (PT) 5 times/wk  -EB       [facility] Name 11/11/24 1404          Positioning and Restraints    Pre-Treatment Position bedside commode  -EB     Post Treatment Position bed  -EB     In Bed supine;call light within reach;encouraged to call for assist;exit alarm on  -EB               User Key  (r) = Recorded By, (t) = Taken By, (c) = Cosigned By      Initials Name Provider Type    Tita Escalera PTA Physical Therapist Assistant                   Outcome Measures       Row Name 11/11/24 1406 11/11/24 0800       How much help from another person do you currently need...    Turning from your back to your side while in flat bed without using bedrails? 3  -EB 3  -KA    Moving from lying on back to sitting on the side of a flat bed without bedrails? 3  -EB 3  -KA    Moving to and from a bed to a chair (including a wheelchair)? 3  -EB 2  -KA     Standing up from a chair using your arms (e.g., wheelchair, bedside chair)? 3  -EB 2  -KA    Climbing 3-5 steps with a railing? 1  -EB 1  -KA    To walk in hospital room? 3  -EB 2  -KA    AM-PAC 6 Clicks Score (PT) 16  -EB 13  -KA      Row Name 11/11/24 1211          Functional Assessment    Outcome Measure Options AM-PAC 6 Clicks Daily Activity (OT)  -MM               User Key  (r) = Recorded By, (t) = Taken By, (c) = Cosigned By      Initials Name Provider Type    EB Tita Power PTA Physical Therapist Assistant    Mary Lopez RN Registered Nurse    Arcelia Ahmadi OT Occupational Therapist                                 Physical Therapy Education       Title: PT OT SLP Therapies (In Progress)       Topic: Physical Therapy (In Progress)       Point: Mobility training (Done)       Learning Progress Summary            Patient Acceptance, E,D, VU by  at 11/11/2024 1406    Acceptance, E,D, VU,NR by  at 11/8/2024 1041    Acceptance, E,TB, NR,VU by MS at 11/6/2024 1041                      Point: Home exercise program (Done)       Learning Progress Summary            Patient Acceptance, E,D, VU,NR by  at 11/8/2024 1041                      Point: Body mechanics (Done)       Learning Progress Summary            Patient Acceptance, E,D, VU by  at 11/11/2024 1406    Acceptance, E,D, VU,NR by  at 11/8/2024 1041    Acceptance, E,TB, NR,VU by MS at 11/6/2024 1041                      Point: Precautions (Not Started)       Learner Progress:  Not documented in this visit.                              User Key       Initials Effective Dates Name Provider Type Discipline    MS 06/16/21 -  Veronica Chen, PT Physical Therapist PT     02/14/23 -  Tita Power PTA Physical Therapist Assistant PT                  PT Recommendation and Plan     Progress: improving  Outcome Evaluation: Pt seen for PT tx today. Pt is improving with mobility. Pt required CGA/SBA with stands from commode and EOB. Pt  ambulated 10ft, seated rest break then 20ft with rwx, CGA/SBA. Slow gait pace and flexed posture but no unsteadiness or LOB noted. Will continue to follow and progress pt as able.     Time Calculation:         PT Charges       Row Name 11/11/24 1406             Time Calculation    Start Time 1046  -EB      Stop Time 1100  -EB      Time Calculation (min) 14 min  -EB      PT Received On 11/11/24  -EB      PT - Next Appointment 11/12/24  -EB         Time Calculation- PT    Total Timed Code Minutes- PT 14 minute(s)  -EB                User Key  (r) = Recorded By, (t) = Taken By, (c) = Cosigned By      Initials Name Provider Type    EB Tita Power PTA Physical Therapist Assistant                  Therapy Charges for Today       Code Description Service Date Service Provider Modifiers Qty    82394711126 HC GAIT TRAINING EA 15 MIN 11/11/2024 Tita Power PTA GP 1            PT G-Codes  Outcome Measure Options: AM-PAC 6 Clicks Daily Activity (OT)  AM-PAC 6 Clicks Score (PT): 16  AM-PAC 6 Clicks Score (OT): 17  Modified Frederick Scale: 4 - Moderately severe disability.  Unable to walk without assistance, and unable to attend to own bodily needs without assistance.       Tita Power PTA  11/11/2024

## 2024-11-11 NOTE — PLAN OF CARE
Goal Outcome Evaluation:  Plan of Care Reviewed With: patient        Progress: improving  Outcome Evaluation: Pt seen for PT tx today. Pt is improving with mobility. Pt required CGA/SBA with stands from commode and EOB. Pt ambulated 10ft, seated rest break then 20ft with rwx, CGA/SBA. Slow gait pace and flexed posture but no unsteadiness or LOB noted. Will continue to follow and progress pt as able.

## 2024-11-11 NOTE — OUTREACH NOTE
Prep Survey      Flowsheet Row Responses   [facility] patient discharged from? [place]   Is LACE score < 7 ? No   Eligibility [facility]   Date of Admission 11/05/24   Date of Discharge 11/11/24   Discharge Disposition Home-Health Care Sv   Discharge diagnosis CHF exacerbation   Does the patient have one of the following disease processes/diagnoses(primary or secondary)? CHF   Does the patient have Home health ordered? Yes   What is the Home health agency?  [facility]   Is there a DME ordered? No   Medication alerts for this patient Torsemide   Prep survey completed? Yes            Christina HUSAIN - Registered Nurse

## 2024-11-12 ENCOUNTER — TELEPHONE (OUTPATIENT)
Dept: FAMILY MEDICINE CLINIC | Facility: CLINIC | Age: 84
End: 2024-11-12
Payer: MEDICARE

## 2024-11-12 ENCOUNTER — TRANSITIONAL CARE MANAGEMENT TELEPHONE ENCOUNTER (OUTPATIENT)
Dept: CALL CENTER | Facility: HOSPITAL | Age: 84
End: 2024-11-12
Payer: MEDICARE

## 2024-11-12 ENCOUNTER — TELEPHONE (OUTPATIENT)
Dept: ONCOLOGY | Facility: CLINIC | Age: 84
End: 2024-11-12
Payer: MEDICARE

## 2024-11-12 NOTE — OUTREACH NOTE
Call Center TCM Note      Flowsheet Row Responses   [facility] patient discharged from? [place]   Does the patient have one of the following disease processes/diagnoses(primary or secondary)? CHF   TCM attempt successful? No   Unsuccessful attempts Attempt 2             Kelsey Victor MA    11/12/2024, 14:56 EST

## 2024-11-12 NOTE — TELEPHONE ENCOUNTER
Caller: Halie Guidry    Relationship to patient: Self    Best call back number: [phone]    Chief complaint: PATIENT CALLED TO RESCHEDULE     Type of visit: LAB AND INJECTION     Requested date: 11-18-24     If rescheduling, when is the original appointment: 11-11-24

## 2024-11-12 NOTE — CASE MANAGEMENT/SOCIAL WORK
Case Management Discharge Note      Final Note: [facility] with family transport    Provided Post Acute Provider List?: Yes  Post Acute Provider List: Nursing Home  Provided Post Acute Provider Quality & Resource List?: Yes  Post Acute Provider Quality and Resource List: Nursing Home  Delivered To: Patient  Method of Delivery: In person    Selected Continued Care - Discharged on 11/11/2024 Admission date: 11/5/2024 - Discharge disposition: Home or Self Care      Destination    No services have been selected for the patient.                Durable Medical Equipment    No services have been selected for the patient.                Dialysis/Infusion    No services have been selected for the patient.                Home Medical Care       Service Provider Services Address Phone Fax Patient Preferred    [facility] [address], [address], [facility] [address] [phone] --              Therapy    No services have been selected for the patient.                Community Resources    No services have been selected for the patient.                Community & DME    No services have been selected for the patient.                    Selected Continued Care - Episodes Includes continued care and service providers with selected services from the active episodes listed below      [facility] Episode start date: 9/10/2024   There are no active outsourced providers for this episode.             Chronic Care Management Episode start date: 7/31/2024 (Paused)   There are no active outsourced providers for this episode.                 Selected Continued Care - Prior Encounters Includes continued care and service providers with selected services from prior encounters from 8/7/2024 to 11/11/2024      Discharged on 10/19/2024 Admission date: 10/10/2024 - Discharge disposition: Home or Self Care      Destination       Service Provider Services Address Phone Fax  Patient Preferred    [facility] [address] [facility] [address] [phone] --              Home Medical Care       Service Provider Services Address Phone Fax Patient Preferred    [facility] [address], [address] [address] [phone]        Internal Comment last updated by Alon Hernandez RN 10/11/2024 1015    Patient is current with [facility]. HEENA Ruggiero RN                                 Discharged on 9/4/2024 Admission date: 8/24/2024 - Discharge disposition: Skilled Nursing Facility (DC - External)      Destination       Service Provider Services Address Phone Fax Patient Preferred    [facility] [address] [facility] [address] [phone] --                               Final Discharge Disposition Code: 01 - home or self-care

## 2024-11-12 NOTE — OUTREACH NOTE
Call Center TCM Note      Flowsheet Row Responses   [facility] patient discharged from? [place]   Does the patient have one of the following disease processes/diagnoses(primary or secondary)? CHF   TCM attempt successful? No   Unsuccessful attempts Attempt 1             Kelsey Victor MA    11/12/2024, 08:48 EST

## 2024-11-13 ENCOUNTER — TELEPHONE (OUTPATIENT)
Dept: CASE MANAGEMENT | Facility: OTHER | Age: 84
End: 2024-11-13
Payer: MEDICARE

## 2024-11-13 ENCOUNTER — TRANSITIONAL CARE MANAGEMENT TELEPHONE ENCOUNTER (OUTPATIENT)
Dept: CALL CENTER | Facility: HOSPITAL | Age: 84
End: 2024-11-13
Payer: MEDICARE

## 2024-11-13 DIAGNOSIS — L03.115 BILATERAL CELLULITIS OF LOWER LEG: ICD-10-CM

## 2024-11-13 DIAGNOSIS — L03.116 BILATERAL CELLULITIS OF LOWER LEG: ICD-10-CM

## 2024-11-13 DIAGNOSIS — I50.9 ACUTE CONGESTIVE HEART FAILURE, UNSPECIFIED HEART FAILURE TYPE: Primary | ICD-10-CM

## 2024-11-13 NOTE — OUTREACH NOTE
Call Center TCM Note      Flowsheet Row Responses   [facility] [place] patient discharged from? [place]   Does the patient have one of the following disease processes/diagnoses(primary or secondary)? CHF   TCM attempt successful? No   Unsuccessful attempts Attempt 3   Wrap up additional comments D/C DX,  CHF exacerbation - Unable to reach pt x 3 attempts for TCM call. Pt is not yet scheduled for TCM APPT with PCP Dr Napoleon Mead. Discharged from [facility] 11/11/2024.             Kelsey Victor MA    11/13/2024, 08:40 EST

## 2024-11-15 ENCOUNTER — TELEPHONE (OUTPATIENT)
Dept: CASE MANAGEMENT | Facility: OTHER | Age: 84
End: 2024-11-15
Payer: MEDICARE

## 2024-11-16 NOTE — PROGRESS NOTES
"Enter Query Response Below      Query Response:   Unspecified stage of pressure injury bilateral lateral coccyx, present on admission     Electronically signed by Eddie Vela MD, 24, 1:26 PM EST.               If applicable, please update the problem list.     Patient: Halie Guidry        : 1940  Account: [id number]           Admit Date: 2024        How to Respond to this query:       a. Click New Note     b. Answer query within the yellow box.                c. Update the Problem List, if applicable.      If you have any questions about this query contact me at: [email or web address]     Dr. Vela,     Risk factors: 84-year-old female with past medical history of \"type 2 diabetes mellitus, degenerative disc disease, dyslipidemia, presented to the hospital with acute on chronic congestive heart failure\", per discharge summary.     Clinical indicators/Treatment:  -  Wound care Note \"Wound 10/10/24 1400 Bilateral lateral coccyx-Placement Date/Time: 10/10/24 1400, Base: blanchable & pink, Periwound: intact, Edges: irregular and Dressing Care: silicone border foam.   - Nursing note 2024 documents \"Pt stated she is wheelchair bound at baseline.\"  - Treated with pressure-redistributing mattress and frequent weight shift.    Please clarify if the patient was treated/monitored for:    - Unspecified stage of pressure injury bilateral lateral coccyx, present on admission  - Other- specify _________    By submitting this query, we are merely seeking further clarification of documentation to accurately reflect all conditions that you are monitoring, evaluating, treating or that extend the hospitalization or utilize additional resources of care. Please utilize your independent clinical judgment when addressing the question(s) above.     This query and your response, once completed, will be entered into the legal medical record.    Sincerely,  Riky Lama RN   Clinical " Documentation Integrity Program

## 2024-11-18 ENCOUNTER — INFUSION (OUTPATIENT)
Dept: ONCOLOGY | Facility: HOSPITAL | Age: 84
End: 2024-11-18
Payer: MEDICARE

## 2024-11-18 ENCOUNTER — LAB (OUTPATIENT)
Dept: LAB | Facility: HOSPITAL | Age: 84
End: 2024-11-18
Payer: MEDICARE

## 2024-11-18 ENCOUNTER — TELEPHONE (OUTPATIENT)
Dept: CASE MANAGEMENT | Facility: OTHER | Age: 84
End: 2024-11-18
Payer: MEDICARE

## 2024-11-18 DIAGNOSIS — Z79.899 ENCOUNTER FOR LONG-TERM (CURRENT) USE OF HIGH-RISK MEDICATION: ICD-10-CM

## 2024-11-18 DIAGNOSIS — C7A.8 PRIMARY MALIGNANT NEUROENDOCRINE TUMOR OF PANCREAS: Primary | ICD-10-CM

## 2024-11-18 DIAGNOSIS — C7A.8 PRIMARY MALIGNANT NEUROENDOCRINE TUMOR OF PANCREAS: ICD-10-CM

## 2024-11-18 LAB
BASOPHILS # BLD AUTO: 0.15 10*3/MM3 (ref 0–0.2)
BASOPHILS NFR BLD AUTO: 1.3 % (ref 0–1.5)
DEPRECATED RDW RBC AUTO: 50 FL (ref 37–54)
EOSINOPHIL # BLD AUTO: 0.34 10*3/MM3 (ref 0–0.4)
EOSINOPHIL NFR BLD AUTO: 2.9 % (ref 0.3–6.2)
ERYTHROCYTE [DISTWIDTH] IN BLOOD BY AUTOMATED COUNT: 15.8 % (ref 12.3–15.4)
HCT VFR BLD AUTO: 30.5 % (ref 34–46.6)
HGB BLD-MCNC: 9.9 G/DL (ref 12–15.9)
IMM GRANULOCYTES # BLD AUTO: 0.22 10*3/MM3 (ref 0–0.05)
IMM GRANULOCYTES NFR BLD AUTO: 1.9 % (ref 0–0.5)
LYMPHOCYTES # BLD AUTO: 1.08 10*3/MM3 (ref 0.7–3.1)
LYMPHOCYTES NFR BLD AUTO: 9.3 % (ref 19.6–45.3)
MCH RBC QN AUTO: 28.1 PG (ref 26.6–33)
MCHC RBC AUTO-ENTMCNC: 32.5 G/DL (ref 31.5–35.7)
MCV RBC AUTO: 86.6 FL (ref 79–97)
MONOCYTES # BLD AUTO: 0.87 10*3/MM3 (ref 0.1–0.9)
MONOCYTES NFR BLD AUTO: 7.5 % (ref 5–12)
NEUTROPHILS NFR BLD AUTO: 77.1 % (ref 42.7–76)
NEUTROPHILS NFR BLD AUTO: 8.92 10*3/MM3 (ref 1.7–7)
NRBC BLD AUTO-RTO: 0.2 /100 WBC (ref 0–0.2)
PLATELET # BLD AUTO: 390 10*3/MM3 (ref 140–450)
PMV BLD AUTO: 9.8 FL (ref 6–12)
RBC # BLD AUTO: 3.52 10*6/MM3 (ref 3.77–5.28)
WBC NRBC COR # BLD AUTO: 11.58 10*3/MM3 (ref 3.4–10.8)

## 2024-11-18 PROCEDURE — 25010000002 OCTREOTIDE PER 1 MG: Performed by: NURSE PRACTITIONER

## 2024-11-18 PROCEDURE — 85025 COMPLETE CBC W/AUTO DIFF WBC: CPT

## 2024-11-18 PROCEDURE — 96372 THER/PROPH/DIAG INJ SC/IM: CPT

## 2024-11-18 PROCEDURE — 36415 COLL VENOUS BLD VENIPUNCTURE: CPT

## 2024-11-18 RX ADMIN — OCTREOTIDE ACETATE 30 MG: KIT at 16:00

## 2024-11-19 ENCOUNTER — TELEPHONE (OUTPATIENT)
Dept: FAMILY MEDICINE CLINIC | Facility: CLINIC | Age: 84
End: 2024-11-19

## 2024-11-19 NOTE — TELEPHONE ENCOUNTER
Caller: [facility]    Relationship: Home Health    Best call back number: [phone]      Additional notes: HOME HEALTH IS CALLING IN VERBAL ORDERS, SKILLED NURSING TWICE A WEEK FOR FOUR WEEKS, LEGS ARE WEAKENED ALSO REQUESTING UNU BOOTS

## 2024-11-19 NOTE — TELEPHONE ENCOUNTER
Hub staff attempted to follow warm transfer process and was unsuccessful     Caller: ALLY ( [facility])    Relationship to patient:     Best call back number: [phone]     Patient is needing: CALLER IS REQUESTING VERBAL ORDERS FOR PATIENT.

## 2024-11-19 NOTE — TELEPHONE ENCOUNTER
I CALLED AND SPOKE WITH ALLY MAURER \  REQUESTING ORDERS FOR PT X 4 WEEKS  PLEASE REVIEW DR SILVA

## 2024-11-20 ENCOUNTER — TELEPHONE (OUTPATIENT)
Dept: CASE MANAGEMENT | Facility: OTHER | Age: 84
End: 2024-11-20
Payer: MEDICARE

## 2024-11-20 RX ORDER — LANSOPRAZOLE 30 MG/1
30 CAPSULE, DELAYED RELEASE ORAL DAILY
Qty: 90 CAPSULE | Refills: 3 | Status: SHIPPED | OUTPATIENT
Start: 2024-11-20

## 2024-11-21 ENCOUNTER — TELEPHONE (OUTPATIENT)
Dept: FAMILY MEDICINE CLINIC | Facility: CLINIC | Age: 84
End: 2024-11-21
Payer: MEDICARE

## 2024-11-21 ENCOUNTER — PATIENT OUTREACH (OUTPATIENT)
Dept: CASE MANAGEMENT | Facility: OTHER | Age: 84
End: 2024-11-21
Payer: MEDICARE

## 2024-11-21 ENCOUNTER — TELEPHONE (OUTPATIENT)
Dept: CASE MANAGEMENT | Facility: OTHER | Age: 84
End: 2024-11-21
Payer: MEDICARE

## 2024-11-21 DIAGNOSIS — L03.115 BILATERAL CELLULITIS OF LOWER LEG: Primary | ICD-10-CM

## 2024-11-21 DIAGNOSIS — L03.116 BILATERAL CELLULITIS OF LOWER LEG: Primary | ICD-10-CM

## 2024-11-21 NOTE — TELEPHONE ENCOUNTER
Caller: CARED[facility]    Relationship: Other    Best call back number: [phone]     What is the best time to reach you: AS SOON AS POSSIBLE     Who are you requesting to speak with (clinical staff, provider,  specific staff member): JENNIFER NURSE NAVIGATOR    What was the call regarding: IVET WOULD LIKE JENNIFER TO CALL HER BACK AS SOON AS POSSIBLE TO DISCUSS PATIENT.

## 2024-11-21 NOTE — OUTREACH NOTE
AMBULATORY CASE MANAGEMENT NOTE    Names and Relationships of Patient/Support Persons: Contact: Anitha; Relationship: Other -     [facility] Interim Update    Spoke with Anitha with [facility].      Discussed patient current diagnosis and physical ability.    States that patient facility has not been providing for patient needs, such as wrapping her legs as ordered.  States that patient seems more sad and was not answering her phone. Stating that patient is not agreeable to  coming out to complete an assessment.     Continues that patient would benefit from pur wic use since she is W/C bound.  States that patient has been attempting to ambulate self to toilet creating increase for fall potential.     Stated that PT came in recently and patient had urine from her W/C to toilet on the floor.    States further that patient still has not heard from [facility].        Education Documentation  No documentation found.        Carol THORNTON  Ambulatory Case Management    11/21/2024, 11:38 EST

## 2024-11-21 NOTE — TELEPHONE ENCOUNTER
Caller: MARISELA MAURER    Relationship to patient: Other    Best call back number:[phone]    Patient is needing: SHE WAS CALLING BACK TO CHECK ORDERS

## 2024-11-21 NOTE — LETTER
[facility] CASE MANAGEMENT [place]  [address] [address]        Mrs. Guidry,    This letter is to make you aware that our Chronic Care Nurse has been trying to contact you regarding ordered care and DME assistance that was requested by your PCP.    Please contact your Chronic Care Nurse Manager at your soonest availability.    Thank you,    Carol Smiley RN [facility]  [phone]

## 2024-11-22 ENCOUNTER — PATIENT OUTREACH (OUTPATIENT)
Dept: CASE MANAGEMENT | Facility: OTHER | Age: 84
End: 2024-11-22
Payer: MEDICARE

## 2024-11-22 ENCOUNTER — TELEPHONE (OUTPATIENT)
Dept: CASE MANAGEMENT | Facility: OTHER | Age: 84
End: 2024-11-22
Payer: MEDICARE

## 2024-11-22 DIAGNOSIS — E78.5 HYPERLIPIDEMIA, UNSPECIFIED HYPERLIPIDEMIA TYPE: ICD-10-CM

## 2024-11-22 DIAGNOSIS — Z79.4 TYPE 2 DIABETES MELLITUS WITH HYPERGLYCEMIA, WITH LONG-TERM CURRENT USE OF INSULIN: Primary | ICD-10-CM

## 2024-11-22 DIAGNOSIS — E11.65 TYPE 2 DIABETES MELLITUS WITH HYPERGLYCEMIA, WITH LONG-TERM CURRENT USE OF INSULIN: Primary | ICD-10-CM

## 2024-11-22 NOTE — OUTREACH NOTE
AMBULATORY CASE MANAGEMENT NOTE    Names and Relationships of Patient/Support Persons: Contact: [facility]; Relationship: Other -     Care Coordination    Spoke with Rolando at [facility]. Introduced self and explained role. She states she needs a verbal order to apply a Unna boot wrap to patient's lower legs because they are oozy. Advised her that [facility] will send PCP a message to get the verbal order and call her back. Message sent to PCP via Epic chat.        Rosa ELDER  Ambulatory Case Management    11/22/2024, 11:56 EST          AMBULATORY CASE MANAGEMENT NOTE    Names and Relationships of Patient/Support Persons: Contact: [facility]; Relationship: Other -     Care Coordination    Called Rolando with [facility] back. Advised her that PCP gave the verbal order to apply Unna boot wraps to patient's lower legs. She thanked [facility] for her assistance.       Rosa ELDER  Ambulatory Case Management    11/22/2024, 14:06 EST

## 2024-11-25 DIAGNOSIS — Z79.4 TYPE 2 DIABETES MELLITUS WITH HYPERGLYCEMIA, WITH LONG-TERM CURRENT USE OF INSULIN: ICD-10-CM

## 2024-11-25 DIAGNOSIS — E11.65 TYPE 2 DIABETES MELLITUS WITH HYPERGLYCEMIA, WITH LONG-TERM CURRENT USE OF INSULIN: ICD-10-CM

## 2024-11-25 RX ORDER — INSULIN LISPRO 100 [IU]/ML
15-20 INJECTION, SOLUTION INTRAVENOUS; SUBCUTANEOUS
Qty: 60 ML | Refills: 0 | Status: SHIPPED | OUTPATIENT
Start: 2024-11-25

## 2024-11-25 NOTE — TELEPHONE ENCOUNTER
Specialty Pharmacy Patient Management Program  Prescription Refill Request     Patient currently fills medications at  Pharmacy. Needing refill(s) on the following:      Requested Prescriptions     Pending Prescriptions Disp Refills    Insulin Glargine, 2 Unit Dial, (TOUJEO) 300 UNIT/ML solution pen-injector injection 30 mL 0     Sig: Inject 90 Units under the skin into the appropriate area as directed Daily.    Insulin Lispro, 1 Unit Dial, (HumaLOG KwikPen) 100 UNIT/ML solution pen-injector 60 mL 0     Sig: Inject 15-20 Units under the skin into the appropriate area as directed 3 (Three) Times a Day Before Meals.       Last visit: 9/10/24  Next visit: 12/10/24    Pended for SERGE Reyna to review, and approve if appropriate.       Gwen Mon, PharmD  Clinical Specialty Pharmacist, Endocrinology  11/25/2024  13:36 EST

## 2024-11-27 ENCOUNTER — PATIENT OUTREACH (OUTPATIENT)
Dept: CASE MANAGEMENT | Facility: OTHER | Age: 84
End: 2024-11-27
Payer: MEDICARE

## 2024-11-27 DIAGNOSIS — I50.9 ACUTE CONGESTIVE HEART FAILURE, UNSPECIFIED HEART FAILURE TYPE: Primary | ICD-10-CM

## 2024-11-27 DIAGNOSIS — E78.5 HYPERLIPIDEMIA, UNSPECIFIED HYPERLIPIDEMIA TYPE: ICD-10-CM

## 2024-11-27 DIAGNOSIS — R53.1 GENERALIZED WEAKNESS: ICD-10-CM

## 2024-11-27 DIAGNOSIS — Z79.4 TYPE 2 DIABETES MELLITUS WITH HYPERGLYCEMIA, WITH LONG-TERM CURRENT USE OF INSULIN: ICD-10-CM

## 2024-11-27 DIAGNOSIS — E11.65 TYPE 2 DIABETES MELLITUS WITH HYPERGLYCEMIA, WITH LONG-TERM CURRENT USE OF INSULIN: ICD-10-CM

## 2024-11-27 NOTE — OUTREACH NOTE
[facility] End of Month Documentation    This Chronic Medical Management Care Plan for Halie Guidry, 84 y.o. female, has been monitored and managed; reviewed and a new plan of care implemented for the month of November.  A cumulative time of 31  minutes was spent on this patient record this month, including phone call with patient; chart review; electronic communication with primary care provider,  nursing needs and therapy communication.  Medication reconciliation/ ED admission.    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; Severe hypothyroidism; Noncompliance with medication regimen; Vitamin D deficiency disease; Tremor; Seizure; Thoracic degenerative disc disease; Acute kidney injury (VIPUL) with acute tubular necrosis (ATN); Hyperglycemia; Type II diabetes mellitus, uncontrolled; Lower abdominal pain; Transaminitis; Emphysematous cystitis; Choledocholithiasis; Hypokalemia; Hypoxia; Generalized abdominal pain; History of Clostridium difficile infection; History of ERCP; Hypomagnesemia; Anxiety disorder; Neuropathic pain; Intertrigo; Weakness of both lower extremities; Accelerated hypertension; Acute cystitis without hematuria; Left lower lobe pneumonia; Cellulitis and abscess of left lower extremity; Benign hypertension with CKD (chronic kidney disease) stage IV; Peripheral edema; Primary malignant neuroendocrine tumor of pancreas; Encounter for long-term (current) use of high-risk medication; Diabetic foot ulcer; Stage 3a chronic kidney disease; Pressure injury of buttock, stage  2; HTN (hypertension); Anemia due to chronic kidney disease; Bilateral lower extremity edema; Cellulitis of right lower extremity; CKD (chronic kidney disease) stage 4, GFR 15-29 ml/min; Acute CHF; Bilateral cellulitis of lower leg; Acute UTI; UTI (urinary tract infection); Acute UTI (urinary tract infection); Metabolic acidosis; Cobalamin deficiency; Dependent edema; Gastroesophageal reflux disease; Mild neurocognitive disorder; Pancreatic neoplasm; Congestive heart failure; CHF exacerbation; and Acute exacerbation of CHF (congestive heart failure) on their problem list., the following items were addressed: medical records; medications, Care coordination/ diesease education and any changes can be found within the plan section of the note.  A detailed listing of time spent for chronic care management is tracked within each outreach encounter.  Current medications include:  has a current medication list which includes the following prescription(s): acetaminophen, ammonium lactate, atorvastatin, bisoprolol, duloxetine, ferrous sulfate, fluticasone, hydralazine, hydrocortisone-bacitracin-zinc oxide-nystatin, insulin glargine (2 unit dial), insulin lispro (1 unit dial), bd pen needle blayne 2nd gen, lansoprazole, levothyroxine, octreotide, pregabalin, terazosin, and torsemide. and the patient is reported to be patient is compliant with medication protocol,  Medications are reported to be effective.  Regarding these diagnoses, referrals were made to the following provider(s):  none.  All notes on chart for PCP to review.    The patient was monitored remotely for pain; activity level, hydration/ dietary management/ leg wraps/ treatment compliance.    The patient's physical needs include:  DME supplies; physical healthcare, Working with Medical Rehab for electiric wheelchair.     The patient's mental support needs include:  not applicable, non-compliant with recommended treatments    The patient's cognitive support needs  include:  not applicable    The patient's psychosocial support needs include:  not applicable    The patient's functional needs include: Working with Medical Rehab for electiric wheelchair    The patient's environmental needs include:  not applicable, na    Care Plan overall comments:  No data recorded    Refer to previous outreach notes for more information on the areas listed above.    Monthly Billing Diagnoses  (I50.9) Acute congestive heart failure, unspecified heart failure type    (E11.65,  Z79.4) Type 2 diabetes mellitus with hyperglycemia, with long-term current use of insulin    (E78.5) Hyperlipidemia, unspecified hyperlipidemia type    (R53.1) Generalized weakness    Medications   Medications have been reconciled    Care Plan progress this month:      Recently Modified Care Plans Updates made since 10/27/2024 12:00 AM      No recently modified care plans.          Instructions   Patient was provided an electronic copy of care plan  CCM services were explained and offered and patient has accepted these services.  Patient has given their written consent to receive CCM services and understands that this includes the authorization of electronic communication of medical information with the other treating providers.  Patient understands that they may stop CCM services at any time and these changes will be effective at the end of the calendar month and will effectively revocate the agreement of CCM services.  Patient understands that only one practitioner can furnish and be paid for CCM services during one calendar month.  Patient also understands that there may be co-payment or deductible fees in association with CCM services.  Patient will continue with at least monthly follow-up calls with the Ambulatory .    Carol THORNTON  Ambulatory Case Management    11/27/2024, 14:06 EST

## 2024-11-27 NOTE — PLAN OF CARE
Working with in home health nurse/ PT/OT to improve patient treatment compliance and decreasing noncompliance

## 2024-12-02 ENCOUNTER — TELEPHONE (OUTPATIENT)
Dept: CASE MANAGEMENT | Facility: OTHER | Age: 84
End: 2024-12-02
Payer: MEDICARE

## 2024-12-02 NOTE — LETTER
[facility] CASE MANAGEMENT [address] [address]        Ms. Guidry,    This letter is to make you aware that your Chronic Care Nurse has attempted several calls to you, without a return call.    Please contact your Chronic Care Nurse to confirm that you have appropriate care and have received contact from Dane with [facility] regarding your wheelchair needs.    We look forward to speaking with you again and hope you are doing well.    Thank you!    Carol Smiley RN [facility]  [phone]

## 2024-12-02 NOTE — TELEPHONE ENCOUNTER
[facility], spoke with clinical coordinator ACM was informed that patient was contacted for wheelchair assessment.      PT has called ACM to update on patient progress but to this date patient is not returning ACM calls.  We will send a letter today.

## 2024-12-03 ENCOUNTER — HOSPITAL ENCOUNTER (OUTPATIENT)
Dept: PET IMAGING | Facility: HOSPITAL | Age: 84
Discharge: HOME | End: 2024-12-03
Payer: MEDICARE

## 2024-12-03 DIAGNOSIS — C7A.8 PRIMARY MALIGNANT NEUROENDOCRINE TUMOR OF PANCREAS: Primary | ICD-10-CM

## 2024-12-04 ENCOUNTER — TELEPHONE (OUTPATIENT)
Dept: CASE MANAGEMENT | Facility: OTHER | Age: 84
End: 2024-12-04
Payer: MEDICARE

## 2024-12-04 NOTE — LETTER
[facility] CASE MANAGEMENT [address] [address]        Mrs. Guidry,    This letter is to make you aware that your Chronic Care Nurse has attempted to contact you several times.    We wanted to make sure that you have received your electric wheel chair along with any further in home assistance that you require to manage your healthcare.    Please call us back at your earliest convenience.            Carol Smiley RN [facility]  [phone]

## 2024-12-05 ENCOUNTER — TELEPHONE (OUTPATIENT)
Dept: ONCOLOGY | Facility: CLINIC | Age: 84
End: 2024-12-05
Payer: MEDICARE

## 2024-12-05 NOTE — TELEPHONE ENCOUNTER
----- Message from Meri KILLIAN sent at 12/4/2024  4:17 PM EST -----  Regarding: FW: PET/CT  I updated the octreotide plan  ----- Message -----  From: Arline Redd  Sent: 12/3/2024   5:28 PM EST  To: Kayden Wells RN  Subject: RE: PET/CT                                       Look at new appt for January--please change care plan dates  ----- Message -----  From: Kayden Wells RN  Sent: 12/3/2024   2:39 PM EST  To: Arline Redd  Subject: FW: PET/CT                                       Ok per Dr. Gutierrez  ----- Message -----  From: Napoleon Gutierrez MD  Sent: 12/3/2024   2:15 PM EST  To: Kayden Wells RN  Subject: RE: PET/CT                                       ok  ----- Message -----  From: Kayden Wells RN  Sent: 12/3/2024   1:46 PM EST  To: Napoleon Gutierrez MD  Subject: FW: PET/CT                                       Okay with you to push out PET/follow up/octreotide to the first of the year due to assisted living transportation issues?  ----- Message -----  From: Arline Redd  Sent: 12/3/2024   1:41 PM EST  To: Kayden Wells RN  Subject: FW: PET/CT                                       Can you ask Dr Gutierrez if this patient can postpone this until after the first of the year. She is in assisted living and it is so hard to arrange transportation and is difficult during the holidays.     Thanks-- let me know  ----- Message -----  From: Merlyn Mon APRN  Sent: 12/3/2024   1:13 PM EST  To: Arline Redd  Subject: FW: PET/CT                                       Can you please help with getting this scheduled?    I placed new order for correct PET scan.  Not sure why her appointment was cancelled/rescheduled and the patient was not notified?  She sees Dr. Gutierrez 12/16 to review scans.    Thank you!  ----- Message -----  From: Dayo Garcia, [facility]  Sent: 12/3/2024  11:51 AM EST  To: SERGE Duncan  Subject: PET/CT                                           Ms. Guidry came into the  department for a PET/CT scan today.  She was unaware that the PET/CT had been canceled and rescheduled to 12-9, however, upon looking into the order, it appears that the scan may have been ordered incorrectly.  The scan that was scheduled for 12-9 was a standard PET/CT with FDG, however the indication for the patients test is a neuroendocrine tumor of the pancreas, and that is usually done with the Dotatate radiotracer, which was the scan that was performed on Ms. Guidry 6-17-24.  If the indication for the test is truly neuroendocrine tumor then the current PET/CT order for an FDG scan will have to be canceled and the scan will have to be reordered with Dotatate as the tracer.  Also, we will need to make sure that the patient has not had her Octreotide injection within 28 days of the scheduled PET/CT, as this would interfere with the scan.  If you have any questions please call us. [phone].  Dotatate scans are performed at our 2pm time slot only, and scheduling has instructions to call us before ordering them and putting them on as this is a specialty radiotracer.    Thanks

## 2024-12-09 ENCOUNTER — HOSPITAL ENCOUNTER (OUTPATIENT)
Dept: PET IMAGING | Facility: HOSPITAL | Age: 84
Discharge: HOME | End: 2024-12-09
Payer: MEDICARE

## 2024-12-09 ENCOUNTER — SPECIALTY PHARMACY (OUTPATIENT)
Dept: ENDOCRINOLOGY | Age: 84
End: 2024-12-09
Payer: MEDICARE

## 2024-12-09 NOTE — PROGRESS NOTES
Specialty Pharmacy Refill Coordination Note     Halie is a 84 y.o. female contacted today regarding refills of  2 specialty medication(s).    Reviewed and verified with patient:       Specialty medication(s) and dose(s) confirmed: n/a        Delivery Questions      Flowsheet Row Most Recent Value   Delivery method UPS   Delivery address verified with patient/caregiver? Yes   Delivery address Home   Number of medications in delivery 2   Medication(s) being filled and delivered Insulin Glargine (TOUJEO), Insulin Lispro (HUMALOG)   Doses left of specialty medications n/a   Copay verified? Yes   Copay amount $251.05   Copay form of payment Credit/debit on file   Ship Date 12/9/24   Delivery Date 12/10/24   Signature Required No                   Follow-up: 93 day(s)     Judy Arrieta, Pharmacy Technician  Specialty Pharmacy Technician

## 2024-12-10 ENCOUNTER — PATIENT OUTREACH (OUTPATIENT)
Dept: CASE MANAGEMENT | Facility: OTHER | Age: 84
End: 2024-12-10
Payer: MEDICARE

## 2024-12-10 DIAGNOSIS — L03.116 BILATERAL CELLULITIS OF LOWER LEG: ICD-10-CM

## 2024-12-10 DIAGNOSIS — I50.9 ACUTE CONGESTIVE HEART FAILURE, UNSPECIFIED HEART FAILURE TYPE: Primary | ICD-10-CM

## 2024-12-10 DIAGNOSIS — R53.1 GENERALIZED WEAKNESS: ICD-10-CM

## 2024-12-10 DIAGNOSIS — L03.115 BILATERAL CELLULITIS OF LOWER LEG: ICD-10-CM

## 2024-12-10 NOTE — OUTREACH NOTE
"AMBULATORY CASE MANAGEMENT NOTE    Names and Relationships of Patient/Support Persons: Contact: Halie Guidry; Relationship: Self -     CCM Interim Update    Received a call from patient this afternoon, verified by name.    States that she has not been answering her phone lately d/t \"spam calls.\"    States that she has not heard from [facility] for wheel chair evaluation.    States that she has had to reschedule her PET scan.      Continues to state that she is still working with [facility] nurse and therapy.  States that she still has a nurse come in to wrap her legs but no one came today.    Denies shortness of breath, weight gain or unusual cough or wheeze.  But does verbalize that her legs do have edema.      Care Coordination    ACM was able to provide active listening, during telephonic interview.    ACM providing encouragement and reassurance during interview.  Continued disease education along with recommendations and modifications.     PET scan was not completed, and will need to reschedule as patient was made aware that her PET was not scheduled when she did arrive for her appointment.    ACM will f/u with [facility] medical.    Next outreach has been scheduled. No further needs.        Education Documentation  No documentation found.        Carol THORNTON  Ambulatory Case Management    12/10/2024, 15:14 EST  "

## 2024-12-10 NOTE — PLAN OF CARE
We are also working with [facility] for fully electric wheelchair.    Problem: Heart Failure  Goal: Track and Manage Activity and Exertion  Outcome: Not Progressing  Intervention: My Activity and Exertion To Do List  Description:   Why is this important?  Exercising is very important when managing your heart failure.  It will help your heart get stronger.  Wearing a pedometer or using a fitness tracker can help you stay motivated.    Flowsheets (Taken 12/10/2024 1508)  My Activity and Exertion To Do List: (working with therapy and [employer] ) track symptoms during activity in diary  Goal: Optimal Care Coordination of a Patient Experiencing Heart Failure  Outcome: Not Progressing  Intervention: Identify and Manage Comorbidities and Complications  Flowsheets (Taken 12/10/2024 1508)  Identify and Manage Comorbidities and Complications:   difficulty in making life-long changes acknowledged   healthy lifestyle encouraged   home monitoring of blood pressure encouraged   self-awareness of signs/symptoms of worsening disease encouraged   signs/symptoms of comorbidities identified  Intervention: Maintain Strength and Functional Ability  Flowsheets (Taken 12/10/2024 1508)  Maintain Strength and Functional Ability:   activity or exercise based on tolerance encouraged   barriers to activity identified and managed   daily activity/exercise promoted   maximum independence promoted   self-care encouraged     Problem: Heart Failure  Goal: Track and Manage Fluids and Swelling  Outcome: Progressing  Intervention: My Fluids and Swelling To Do List  Description:   Why is this important?  It is important to check your weight at home every day.  Also, check for swelling in your feet and legs every day.  Keeping your legs up while you are sitting and doing ankle pump exercises will help with the swelling.    Flowsheets (Taken 12/10/2024 1508)  My Fluids and Swelling To Do List:   call office if I gain more than 2 pounds in 1 day or  5 pounds in 1 week   keep legs up when sitting   track weight in diary   use salt in moderation   watch for swelling in feet, ankles and legs every day  Goal: Track and Manage Symptoms  Outcome: Progressing  Intervention: My Heart Failure Symptoms To Do List  Description:   Why is this important?  You will be able to handle your symptoms better if you keep track of them.  Making some simple changes to your lifestyle will help.  Knowing how to self-manage shortness of breath and when to call the doctor is very important.  Eating healthy is one thing you can do to take care of yourself.    Flowsheets (Taken 12/10/2024 150)  My Heart Failure Symptoms To Do List:   bring diary to all appointments   track symptoms and what helps to feel better or worse   know when to call the doctor

## 2024-12-16 ENCOUNTER — TELEPHONE (OUTPATIENT)
Dept: FAMILY MEDICINE CLINIC | Facility: CLINIC | Age: 84
End: 2024-12-16

## 2024-12-16 NOTE — TELEPHONE ENCOUNTER
Caller: SADIA FOUNTAIN/ ERASTO    Relationship: Other    Best call back number: [phone]     What orders are you requesting (i.e. lab or imaging): PHYSICAL THERAPY    Where will you receive your lab/imaging services: IN HOME    Additional notes: VERBAL ORDERS FOR PHYSICAL THERAPY 2 X A WEEK FOR 4 WEEKS

## 2024-12-27 ENCOUNTER — TELEPHONE (OUTPATIENT)
Dept: FAMILY MEDICINE CLINIC | Facility: CLINIC | Age: 84
End: 2024-12-27
Payer: MEDICARE

## 2024-12-27 ENCOUNTER — DOCUMENTATION (OUTPATIENT)
Dept: FAMILY MEDICINE CLINIC | Facility: CLINIC | Age: 84
End: 2024-12-27
Payer: MEDICARE

## 2024-12-27 NOTE — PROGRESS NOTES
Lupe desir from [facility] calling to get orders to continue treating pt for wound care     Please fax orders to [phone]  If you have any other question a good callback number is [phone]

## 2024-12-27 NOTE — TELEPHONE ENCOUNTER
Lupe desir from [facility] calling to get orders to continue treating pt for wound care     Please fax orders to [phone]  If you have any other question a good callback number is [phone]

## 2024-12-27 NOTE — TELEPHONE ENCOUNTER
Lupe called with update for pt care and need to know if anything else should be done.  Pt c/o pain and tightness in BLE with JESUSITA boots present  BLE red, swollen, tender to the touch L calf measures at 28.5 in, R calf 39.  LLE increased in size, while other decreased. Pt does elevate BLE  Denies drainage and foul smell, CP, SOA, jaw pain, HA, lower back pain  VS /68, 71, 98.2    Per V/O TANESHA. SERGE Conway  Cont tx as ordered  If wound begins to drain obtain cx  For significant swelling or fever pt to go to [facility] given v/o as above. R/V order.   Giuliana Armenta LPN    **Upon v/o pt was requesting pain medicine. Pt states she has two tylenol left and the Lyrica is not touching pain.   Please advise. MM

## 2024-12-30 ENCOUNTER — TELEPHONE (OUTPATIENT)
Dept: ONCOLOGY | Facility: CLINIC | Age: 84
End: 2024-12-30
Payer: MEDICARE

## 2024-12-30 DIAGNOSIS — C7A.8 PRIMARY MALIGNANT NEUROENDOCRINE TUMOR OF PANCREAS: Primary | ICD-10-CM

## 2024-12-30 NOTE — TELEPHONE ENCOUNTER
----- Message from Kayden KILLIAN sent at 12/30/2024 11:47 AM EST -----  Regarding: FW: Denial of Pet scan    ----- Message -----  From: Kayden Wells RN  Sent: 12/30/2024  11:47 AM EST  To: Jaylen Benton  Subject: RE: Denial of Pet scan                           Dr. Gutierrez says it is fine to change to CT chest abdomen and pelvis WITHOUT IV contrast, WITH oral contrast. I will enter the order now and cancel the PET order. Are you willing/able to contact the patient and schedule the CT instead or do I need to have scheduling arrange it?  ----- Message -----  From: Ziggy Bejarano RegSched Rep  Sent: 12/30/2024  11:11 AM EST  To: Kayden Wells RN  Subject: Denial of Pet scan                               Aimee Monique     [facility] has denied this pet scan. Their denial reason was: Due to previous imaging results a pet scan is not needed for this patient and recent CT's have not been done.     Peer to peer review is not an available option. The only thing that can be done is an appeal.   An appeal has to be submitted by phone and faxed clinicals. They stated this process takes from 60-90 days to get a decision back at all.     I am so sorry.   How would you like to proceed?

## 2025-01-01 ENCOUNTER — HOSPITAL ENCOUNTER (INPATIENT)
Facility: HOSPITAL | Age: 85
LOS: 2 days | End: 2025-05-30
Attending: STUDENT IN AN ORGANIZED HEALTH CARE EDUCATION/TRAINING PROGRAM | Admitting: STUDENT IN AN ORGANIZED HEALTH CARE EDUCATION/TRAINING PROGRAM
Payer: COMMERCIAL

## 2025-01-01 ENCOUNTER — HOSPITAL ENCOUNTER (INPATIENT)
Facility: HOSPITAL | Age: 85
LOS: 5 days | End: 2025-05-28
Attending: EMERGENCY MEDICINE | Admitting: STUDENT IN AN ORGANIZED HEALTH CARE EDUCATION/TRAINING PROGRAM
Payer: MEDICARE

## 2025-01-01 VITALS
TEMPERATURE: 96.3 F | OXYGEN SATURATION: 93 % | WEIGHT: 214.51 LBS | BODY MASS INDEX: 32.51 KG/M2 | DIASTOLIC BLOOD PRESSURE: 73 MMHG | SYSTOLIC BLOOD PRESSURE: 142 MMHG | RESPIRATION RATE: 20 BRPM | HEIGHT: 68 IN | HEART RATE: 88 BPM

## 2025-01-01 VITALS
SYSTOLIC BLOOD PRESSURE: 98 MMHG | DIASTOLIC BLOOD PRESSURE: 42 MMHG | OXYGEN SATURATION: 92 % | TEMPERATURE: 99.4 F | RESPIRATION RATE: 16 BRPM | HEART RATE: 49 BPM

## 2025-01-01 DIAGNOSIS — I73.9 PVD (PERIPHERAL VASCULAR DISEASE): ICD-10-CM

## 2025-01-01 DIAGNOSIS — L03.119 CELLULITIS OF LOWER EXTREMITY, UNSPECIFIED LATERALITY: Primary | ICD-10-CM

## 2025-01-01 DIAGNOSIS — N17.9 AKI (ACUTE KIDNEY INJURY): ICD-10-CM

## 2025-01-01 DIAGNOSIS — N18.9 CHRONIC KIDNEY DISEASE, UNSPECIFIED CKD STAGE: ICD-10-CM

## 2025-01-01 LAB
ALBUMIN SERPL-MCNC: 3.1 G/DL (ref 3.5–5.2)
ALBUMIN/GLOB SERPL: 0.6 G/DL
ALP SERPL-CCNC: 126 U/L (ref 39–117)
ALT SERPL W P-5'-P-CCNC: 6 U/L (ref 1–33)
ANION GAP SERPL CALCULATED.3IONS-SCNC: 13.8 MMOL/L (ref 5–15)
ANION GAP SERPL CALCULATED.3IONS-SCNC: 14 MMOL/L (ref 5–15)
ANION GAP SERPL CALCULATED.3IONS-SCNC: 16 MMOL/L (ref 5–15)
ANION GAP SERPL CALCULATED.3IONS-SCNC: 18 MMOL/L (ref 5–15)
APTT PPP: 34 SECONDS (ref 22.7–35.4)
AST SERPL-CCNC: 12 U/L (ref 1–32)
BACTERIA SPEC AEROBE CULT: NORMAL
BACTERIA SPEC AEROBE CULT: NORMAL
BASOPHILS # BLD AUTO: 0.1 10*3/MM3 (ref 0–0.2)
BASOPHILS # BLD AUTO: 0.1 10*3/MM3 (ref 0–0.2)
BASOPHILS # BLD AUTO: 0.11 10*3/MM3 (ref 0–0.2)
BASOPHILS NFR BLD AUTO: 0.7 % (ref 0–1.5)
BASOPHILS NFR BLD AUTO: 0.8 % (ref 0–1.5)
BASOPHILS NFR BLD AUTO: 0.8 % (ref 0–1.5)
BILIRUB SERPL-MCNC: 0.2 MG/DL (ref 0–1.2)
BUN SERPL-MCNC: 71 MG/DL (ref 8–23)
BUN SERPL-MCNC: 75 MG/DL (ref 8–23)
BUN SERPL-MCNC: 83 MG/DL (ref 8–23)
BUN SERPL-MCNC: 84 MG/DL (ref 8–23)
BUN/CREAT SERPL: 15.2 (ref 7–25)
BUN/CREAT SERPL: 15.9 (ref 7–25)
BUN/CREAT SERPL: 17.2 (ref 7–25)
BUN/CREAT SERPL: 17.3 (ref 7–25)
C AURIS DNA SPEC QL NAA+NON-PROBE: NOT DETECTED
CALCIUM SPEC-SCNC: 7.6 MG/DL (ref 8.6–10.5)
CALCIUM SPEC-SCNC: 7.7 MG/DL (ref 8.6–10.5)
CALCIUM SPEC-SCNC: 7.8 MG/DL (ref 8.6–10.5)
CALCIUM SPEC-SCNC: 8.1 MG/DL (ref 8.6–10.5)
CHLORIDE SERPL-SCNC: 104 MMOL/L (ref 98–107)
CHLORIDE SERPL-SCNC: 105 MMOL/L (ref 98–107)
CHLORIDE SERPL-SCNC: 108 MMOL/L (ref 98–107)
CHLORIDE SERPL-SCNC: 110 MMOL/L (ref 98–107)
CO2 SERPL-SCNC: 16 MMOL/L (ref 22–29)
CO2 SERPL-SCNC: 16 MMOL/L (ref 22–29)
CO2 SERPL-SCNC: 18.2 MMOL/L (ref 22–29)
CO2 SERPL-SCNC: 20 MMOL/L (ref 22–29)
CREAT SERPL-MCNC: 4.67 MG/DL (ref 0.57–1)
CREAT SERPL-MCNC: 4.72 MG/DL (ref 0.57–1)
CREAT SERPL-MCNC: 4.82 MG/DL (ref 0.57–1)
CREAT SERPL-MCNC: 4.85 MG/DL (ref 0.57–1)
DEPRECATED RDW RBC AUTO: 45.6 FL (ref 37–54)
DEPRECATED RDW RBC AUTO: 47.3 FL (ref 37–54)
DEPRECATED RDW RBC AUTO: 48.3 FL (ref 37–54)
DEPRECATED RDW RBC AUTO: 48.6 FL (ref 37–54)
EGFRCR SERPLBLD CKD-EPI 2021: 8.4 ML/MIN/1.73
EGFRCR SERPLBLD CKD-EPI 2021: 8.4 ML/MIN/1.73
EGFRCR SERPLBLD CKD-EPI 2021: 8.6 ML/MIN/1.73
EGFRCR SERPLBLD CKD-EPI 2021: 8.8 ML/MIN/1.73
EOSINOPHIL # BLD AUTO: 0.16 10*3/MM3 (ref 0–0.4)
EOSINOPHIL # BLD AUTO: 0.19 10*3/MM3 (ref 0–0.4)
EOSINOPHIL # BLD AUTO: 0.38 10*3/MM3 (ref 0–0.4)
EOSINOPHIL NFR BLD AUTO: 1.2 % (ref 0.3–6.2)
EOSINOPHIL NFR BLD AUTO: 1.4 % (ref 0.3–6.2)
EOSINOPHIL NFR BLD AUTO: 2.9 % (ref 0.3–6.2)
ERYTHROCYTE [DISTWIDTH] IN BLOOD BY AUTOMATED COUNT: 15.3 % (ref 12.3–15.4)
ERYTHROCYTE [DISTWIDTH] IN BLOOD BY AUTOMATED COUNT: 15.4 % (ref 12.3–15.4)
ERYTHROCYTE [DISTWIDTH] IN BLOOD BY AUTOMATED COUNT: 15.4 % (ref 12.3–15.4)
ERYTHROCYTE [DISTWIDTH] IN BLOOD BY AUTOMATED COUNT: 15.6 % (ref 12.3–15.4)
GLOBULIN UR ELPH-MCNC: 4.9 GM/DL
GLUCOSE BLDC GLUCOMTR-MCNC: 118 MG/DL (ref 70–130)
GLUCOSE BLDC GLUCOMTR-MCNC: 130 MG/DL (ref 70–130)
GLUCOSE BLDC GLUCOMTR-MCNC: 131 MG/DL (ref 70–130)
GLUCOSE BLDC GLUCOMTR-MCNC: 144 MG/DL (ref 70–130)
GLUCOSE BLDC GLUCOMTR-MCNC: 161 MG/DL (ref 70–130)
GLUCOSE BLDC GLUCOMTR-MCNC: 189 MG/DL (ref 70–130)
GLUCOSE BLDC GLUCOMTR-MCNC: 51 MG/DL (ref 70–130)
GLUCOSE BLDC GLUCOMTR-MCNC: 72 MG/DL (ref 70–130)
GLUCOSE BLDC GLUCOMTR-MCNC: 73 MG/DL (ref 70–130)
GLUCOSE BLDC GLUCOMTR-MCNC: 92 MG/DL (ref 70–130)
GLUCOSE SERPL-MCNC: 134 MG/DL (ref 65–99)
GLUCOSE SERPL-MCNC: 137 MG/DL (ref 65–99)
GLUCOSE SERPL-MCNC: 58 MG/DL (ref 65–99)
GLUCOSE SERPL-MCNC: 86 MG/DL (ref 65–99)
HBA1C MFR BLD: 7.5 % (ref 4.8–5.6)
HCT VFR BLD AUTO: 20.4 % (ref 34–46.6)
HCT VFR BLD AUTO: 22.6 % (ref 34–46.6)
HCT VFR BLD AUTO: 23.2 % (ref 34–46.6)
HCT VFR BLD AUTO: 27.2 % (ref 34–46.6)
HGB BLD-MCNC: 6.3 G/DL (ref 12–15.9)
HGB BLD-MCNC: 7.4 G/DL (ref 12–15.9)
HGB BLD-MCNC: 7.4 G/DL (ref 12–15.9)
HGB BLD-MCNC: 8.4 G/DL (ref 12–15.9)
IMM GRANULOCYTES # BLD AUTO: 0.06 10*3/MM3 (ref 0–0.05)
IMM GRANULOCYTES # BLD AUTO: 0.08 10*3/MM3 (ref 0–0.05)
IMM GRANULOCYTES # BLD AUTO: 0.09 10*3/MM3 (ref 0–0.05)
IMM GRANULOCYTES NFR BLD AUTO: 0.5 % (ref 0–0.5)
IMM GRANULOCYTES NFR BLD AUTO: 0.6 % (ref 0–0.5)
IMM GRANULOCYTES NFR BLD AUTO: 0.6 % (ref 0–0.5)
INR PPP: 1.31 (ref 0.9–1.1)
LYMPHOCYTES # BLD AUTO: 0.6 10*3/MM3 (ref 0.7–3.1)
LYMPHOCYTES # BLD AUTO: 0.67 10*3/MM3 (ref 0.7–3.1)
LYMPHOCYTES # BLD AUTO: 0.88 10*3/MM3 (ref 0.7–3.1)
LYMPHOCYTES NFR BLD AUTO: 4.3 % (ref 19.6–45.3)
LYMPHOCYTES NFR BLD AUTO: 4.8 % (ref 19.6–45.3)
LYMPHOCYTES NFR BLD AUTO: 6.8 % (ref 19.6–45.3)
MCH RBC QN AUTO: 26.5 PG (ref 26.6–33)
MCH RBC QN AUTO: 26.7 PG (ref 26.6–33)
MCH RBC QN AUTO: 27 PG (ref 26.6–33)
MCH RBC QN AUTO: 27 PG (ref 26.6–33)
MCHC RBC AUTO-ENTMCNC: 30.9 G/DL (ref 31.5–35.7)
MCHC RBC AUTO-ENTMCNC: 30.9 G/DL (ref 31.5–35.7)
MCHC RBC AUTO-ENTMCNC: 31.9 G/DL (ref 31.5–35.7)
MCHC RBC AUTO-ENTMCNC: 32.7 G/DL (ref 31.5–35.7)
MCV RBC AUTO: 82.5 FL (ref 79–97)
MCV RBC AUTO: 84.7 FL (ref 79–97)
MCV RBC AUTO: 85.7 FL (ref 79–97)
MCV RBC AUTO: 86.3 FL (ref 79–97)
MONOCYTES # BLD AUTO: 0.85 10*3/MM3 (ref 0.1–0.9)
MONOCYTES # BLD AUTO: 0.89 10*3/MM3 (ref 0.1–0.9)
MONOCYTES # BLD AUTO: 1.03 10*3/MM3 (ref 0.1–0.9)
MONOCYTES NFR BLD AUTO: 6.4 % (ref 5–12)
MONOCYTES NFR BLD AUTO: 6.5 % (ref 5–12)
MONOCYTES NFR BLD AUTO: 7.3 % (ref 5–12)
NEUTROPHILS NFR BLD AUTO: 10.72 10*3/MM3 (ref 1.7–7)
NEUTROPHILS NFR BLD AUTO: 11.94 10*3/MM3 (ref 1.7–7)
NEUTROPHILS NFR BLD AUTO: 12 10*3/MM3 (ref 1.7–7)
NEUTROPHILS NFR BLD AUTO: 82.5 % (ref 42.7–76)
NEUTROPHILS NFR BLD AUTO: 85.2 % (ref 42.7–76)
NEUTROPHILS NFR BLD AUTO: 86.7 % (ref 42.7–76)
NRBC BLD AUTO-RTO: 0 /100 WBC (ref 0–0.2)
PLATELET # BLD AUTO: 362 10*3/MM3 (ref 140–450)
PLATELET # BLD AUTO: 387 10*3/MM3 (ref 140–450)
PLATELET # BLD AUTO: 392 10*3/MM3 (ref 140–450)
PLATELET # BLD AUTO: 404 10*3/MM3 (ref 140–450)
PMV BLD AUTO: 10 FL (ref 6–12)
PMV BLD AUTO: 9.3 FL (ref 6–12)
PMV BLD AUTO: 9.3 FL (ref 6–12)
PMV BLD AUTO: 9.5 FL (ref 6–12)
POTASSIUM SERPL-SCNC: 4.5 MMOL/L (ref 3.5–5.2)
POTASSIUM SERPL-SCNC: 4.5 MMOL/L (ref 3.5–5.2)
POTASSIUM SERPL-SCNC: 4.6 MMOL/L (ref 3.5–5.2)
POTASSIUM SERPL-SCNC: 4.9 MMOL/L (ref 3.5–5.2)
PROT SERPL-MCNC: 8 G/DL (ref 6–8.5)
PROTHROMBIN TIME: 16.3 SECONDS (ref 11.7–14.2)
RBC # BLD AUTO: 2.38 10*6/MM3 (ref 3.77–5.28)
RBC # BLD AUTO: 2.74 10*6/MM3 (ref 3.77–5.28)
RBC # BLD AUTO: 2.74 10*6/MM3 (ref 3.77–5.28)
RBC # BLD AUTO: 3.15 10*6/MM3 (ref 3.77–5.28)
SODIUM SERPL-SCNC: 138 MMOL/L (ref 136–145)
SODIUM SERPL-SCNC: 139 MMOL/L (ref 136–145)
SODIUM SERPL-SCNC: 140 MMOL/L (ref 136–145)
SODIUM SERPL-SCNC: 142 MMOL/L (ref 136–145)
VANCOMYCIN SERPL-MCNC: 23.1 MCG/ML (ref 5–40)
VANCOMYCIN SERPL-MCNC: 26.4 MCG/ML (ref 5–40)
WBC NRBC COR # BLD AUTO: 12.99 10*3/MM3 (ref 3.4–10.8)
WBC NRBC COR # BLD AUTO: 13.26 10*3/MM3 (ref 3.4–10.8)
WBC NRBC COR # BLD AUTO: 13.84 10*3/MM3 (ref 3.4–10.8)
WBC NRBC COR # BLD AUTO: 14.02 10*3/MM3 (ref 3.4–10.8)

## 2025-01-01 PROCEDURE — 82948 REAGENT STRIP/BLOOD GLUCOSE: CPT

## 2025-01-01 PROCEDURE — 63710000001 INSULIN GLARGINE PER 5 UNITS: Performed by: NURSE PRACTITIONER

## 2025-01-01 PROCEDURE — 80048 BASIC METABOLIC PNL TOTAL CA: CPT | Performed by: STUDENT IN AN ORGANIZED HEALTH CARE EDUCATION/TRAINING PROGRAM

## 2025-01-01 PROCEDURE — 85730 THROMBOPLASTIN TIME PARTIAL: CPT | Performed by: EMERGENCY MEDICINE

## 2025-01-01 PROCEDURE — 25010000002 ONDANSETRON PER 1 MG: Performed by: EMERGENCY MEDICINE

## 2025-01-01 PROCEDURE — 25010000002 HALOPERIDOL LACTATE PER 5 MG: Performed by: STUDENT IN AN ORGANIZED HEALTH CARE EDUCATION/TRAINING PROGRAM

## 2025-01-01 PROCEDURE — 25010000002 PIPERACILLIN SOD-TAZOBACTAM PER 1 G: Performed by: EMERGENCY MEDICINE

## 2025-01-01 PROCEDURE — 25010000002 LORAZEPAM PER 2 MG: Performed by: NURSE PRACTITIONER

## 2025-01-01 PROCEDURE — 25010000002 HYDROMORPHONE PER 4 MG: Performed by: STUDENT IN AN ORGANIZED HEALTH CARE EDUCATION/TRAINING PROGRAM

## 2025-01-01 PROCEDURE — 25010000002 HYDROMORPHONE PER 4 MG: Performed by: EMERGENCY MEDICINE

## 2025-01-01 PROCEDURE — 25010000002 PIPERACILLIN SOD-TAZOBACTAM PER 1 G: Performed by: NURSE PRACTITIONER

## 2025-01-01 PROCEDURE — 25010000002 HYDROMORPHONE PER 4 MG: Performed by: NURSE PRACTITIONER

## 2025-01-01 PROCEDURE — 25010000002 HEPARIN (PORCINE) PER 1000 UNITS: Performed by: NURSE PRACTITIONER

## 2025-01-01 PROCEDURE — 25010000002 DIPHENHYDRAMINE PER 50 MG: Performed by: STUDENT IN AN ORGANIZED HEALTH CARE EDUCATION/TRAINING PROGRAM

## 2025-01-01 PROCEDURE — 87040 BLOOD CULTURE FOR BACTERIA: CPT | Performed by: EMERGENCY MEDICINE

## 2025-01-01 PROCEDURE — 25010000002 HYDROMORPHONE 1 MG/ML SOLUTION: Performed by: STUDENT IN AN ORGANIZED HEALTH CARE EDUCATION/TRAINING PROGRAM

## 2025-01-01 PROCEDURE — 25010000002 LORAZEPAM PER 2 MG: Performed by: STUDENT IN AN ORGANIZED HEALTH CARE EDUCATION/TRAINING PROGRAM

## 2025-01-01 PROCEDURE — 83036 HEMOGLOBIN GLYCOSYLATED A1C: CPT | Performed by: NURSE PRACTITIONER

## 2025-01-01 PROCEDURE — 80202 ASSAY OF VANCOMYCIN: CPT | Performed by: NURSE PRACTITIONER

## 2025-01-01 PROCEDURE — 85025 COMPLETE CBC W/AUTO DIFF WBC: CPT | Performed by: STUDENT IN AN ORGANIZED HEALTH CARE EDUCATION/TRAINING PROGRAM

## 2025-01-01 PROCEDURE — 85027 COMPLETE CBC AUTOMATED: CPT | Performed by: NURSE PRACTITIONER

## 2025-01-01 PROCEDURE — 99291 CRITICAL CARE FIRST HOUR: CPT

## 2025-01-01 PROCEDURE — 36415 COLL VENOUS BLD VENIPUNCTURE: CPT | Performed by: NURSE PRACTITIONER

## 2025-01-01 PROCEDURE — 80053 COMPREHEN METABOLIC PANEL: CPT | Performed by: EMERGENCY MEDICINE

## 2025-01-01 PROCEDURE — 85025 COMPLETE CBC W/AUTO DIFF WBC: CPT | Performed by: EMERGENCY MEDICINE

## 2025-01-01 PROCEDURE — 25010000002 VANCOMYCIN 10 G RECONSTITUTED SOLUTION: Performed by: EMERGENCY MEDICINE

## 2025-01-01 PROCEDURE — 85610 PROTHROMBIN TIME: CPT | Performed by: EMERGENCY MEDICINE

## 2025-01-01 PROCEDURE — 87481 CANDIDA DNA AMP PROBE: CPT | Performed by: STUDENT IN AN ORGANIZED HEALTH CARE EDUCATION/TRAINING PROGRAM

## 2025-01-01 PROCEDURE — 99222 1ST HOSP IP/OBS MODERATE 55: CPT | Performed by: SURGERY

## 2025-01-01 PROCEDURE — 25810000003 SODIUM CHLORIDE 0.9 % SOLUTION: Performed by: EMERGENCY MEDICINE

## 2025-01-01 PROCEDURE — 63710000001 INSULIN LISPRO (HUMAN) PER 5 UNITS: Performed by: NURSE PRACTITIONER

## 2025-01-01 RX ORDER — HYDROMORPHONE HYDROCHLORIDE 2 MG/ML
1.5 INJECTION, SOLUTION INTRAMUSCULAR; INTRAVENOUS; SUBCUTANEOUS
Status: DISCONTINUED | OUTPATIENT
Start: 2025-01-01 | End: 2025-01-01 | Stop reason: HOSPADM

## 2025-01-01 RX ORDER — ONDANSETRON 4 MG/1
4 TABLET, ORALLY DISINTEGRATING ORAL EVERY 6 HOURS PRN
Status: DISCONTINUED | OUTPATIENT
Start: 2025-01-01 | End: 2025-05-30 | Stop reason: HOSPADM

## 2025-01-01 RX ORDER — SODIUM CHLORIDE 0.9 % (FLUSH) 0.9 %
10 SYRINGE (ML) INJECTION AS NEEDED
Status: DISCONTINUED | OUTPATIENT
Start: 2025-01-01 | End: 2025-05-30 | Stop reason: HOSPADM

## 2025-01-01 RX ORDER — HYDROMORPHONE HYDROCHLORIDE 1 MG/ML
0.5 INJECTION, SOLUTION INTRAMUSCULAR; INTRAVENOUS; SUBCUTANEOUS
Refills: 0 | Status: DISCONTINUED | OUTPATIENT
Start: 2025-01-01 | End: 2025-01-01

## 2025-01-01 RX ORDER — HALOPERIDOL 5 MG/ML
1 INJECTION INTRAMUSCULAR
Status: DISCONTINUED | OUTPATIENT
Start: 2025-01-01 | End: 2025-05-30 | Stop reason: HOSPADM

## 2025-01-01 RX ORDER — SODIUM CHLORIDE 9 MG/ML
40 INJECTION, SOLUTION INTRAVENOUS AS NEEDED
Status: DISCONTINUED | OUTPATIENT
Start: 2025-01-01 | End: 2025-01-01 | Stop reason: HOSPADM

## 2025-01-01 RX ORDER — HALOPERIDOL 5 MG/ML
1 INJECTION INTRAMUSCULAR EVERY 4 HOURS
Status: DISCONTINUED | OUTPATIENT
Start: 2025-01-01 | End: 2025-05-30 | Stop reason: HOSPADM

## 2025-01-01 RX ORDER — AMOXICILLIN 250 MG
2 CAPSULE ORAL 2 TIMES DAILY PRN
Status: DISCONTINUED | OUTPATIENT
Start: 2025-01-01 | End: 2025-05-30 | Stop reason: HOSPADM

## 2025-01-01 RX ORDER — SODIUM CHLORIDE 0.9 % (FLUSH) 0.9 %
10 SYRINGE (ML) INJECTION AS NEEDED
Status: DISCONTINUED | OUTPATIENT
Start: 2025-01-01 | End: 2025-01-01 | Stop reason: HOSPADM

## 2025-01-01 RX ORDER — INSULIN LISPRO 100 [IU]/ML
2-7 INJECTION, SOLUTION INTRAVENOUS; SUBCUTANEOUS
Status: DISCONTINUED | OUTPATIENT
Start: 2025-01-01 | End: 2025-01-01

## 2025-01-01 RX ORDER — PANTOPRAZOLE SODIUM 40 MG/1
40 TABLET, DELAYED RELEASE ORAL
Status: DISCONTINUED | OUTPATIENT
Start: 2025-01-01 | End: 2025-01-01

## 2025-01-01 RX ORDER — ONDANSETRON 4 MG/1
4 TABLET, ORALLY DISINTEGRATING ORAL EVERY 6 HOURS PRN
Status: CANCELLED | OUTPATIENT
Start: 2025-01-01

## 2025-01-01 RX ORDER — HYDROMORPHONE HYDROCHLORIDE 2 MG/ML
2 INJECTION, SOLUTION INTRAMUSCULAR; INTRAVENOUS; SUBCUTANEOUS
Status: DISCONTINUED | OUTPATIENT
Start: 2025-01-01 | End: 2025-05-30 | Stop reason: HOSPADM

## 2025-01-01 RX ORDER — HYDROMORPHONE HYDROCHLORIDE 2 MG/ML
1.5 INJECTION, SOLUTION INTRAMUSCULAR; INTRAVENOUS; SUBCUTANEOUS EVERY 4 HOURS
Status: CANCELLED | OUTPATIENT
Start: 2025-01-01 | End: 2025-06-02

## 2025-01-01 RX ORDER — SODIUM CHLORIDE 9 MG/ML
40 INJECTION, SOLUTION INTRAVENOUS AS NEEDED
Status: DISCONTINUED | OUTPATIENT
Start: 2025-01-01 | End: 2025-05-30 | Stop reason: HOSPADM

## 2025-01-01 RX ORDER — DIPHENHYDRAMINE HYDROCHLORIDE 50 MG/ML
12.5 INJECTION, SOLUTION INTRAMUSCULAR; INTRAVENOUS EVERY 6 HOURS PRN
Status: DISCONTINUED | OUTPATIENT
Start: 2025-01-01 | End: 2025-05-30 | Stop reason: HOSPADM

## 2025-01-01 RX ORDER — DIPHENHYDRAMINE HYDROCHLORIDE 50 MG/ML
12.5 INJECTION, SOLUTION INTRAMUSCULAR; INTRAVENOUS EVERY 6 HOURS PRN
Status: DISCONTINUED | OUTPATIENT
Start: 2025-01-01 | End: 2025-01-01 | Stop reason: HOSPADM

## 2025-01-01 RX ORDER — PREGABALIN 25 MG/1
25 CAPSULE ORAL 2 TIMES DAILY
Status: CANCELLED | OUTPATIENT
Start: 2025-01-01

## 2025-01-01 RX ORDER — HYDROXYZINE HYDROCHLORIDE 25 MG/1
25 TABLET, FILM COATED ORAL 3 TIMES DAILY PRN
Status: DISCONTINUED | OUTPATIENT
Start: 2025-01-01 | End: 2025-01-01 | Stop reason: HOSPADM

## 2025-01-01 RX ORDER — LORAZEPAM 2 MG/ML
1 INJECTION INTRAMUSCULAR EVERY 4 HOURS PRN
Status: DISCONTINUED | OUTPATIENT
Start: 2025-01-01 | End: 2025-01-01

## 2025-01-01 RX ORDER — LORAZEPAM 2 MG/ML
2 INJECTION INTRAMUSCULAR EVERY 4 HOURS
Status: CANCELLED | OUTPATIENT
Start: 2025-01-01 | End: 2025-06-02

## 2025-01-01 RX ORDER — SODIUM CHLORIDE 0.9 % (FLUSH) 0.9 %
10 SYRINGE (ML) INJECTION AS NEEDED
Status: CANCELLED | OUTPATIENT
Start: 2025-01-01

## 2025-01-01 RX ORDER — FLUTICASONE PROPIONATE 50 MCG
2 SPRAY, SUSPENSION (ML) NASAL DAILY
Status: DISCONTINUED | OUTPATIENT
Start: 2025-01-01 | End: 2025-01-01

## 2025-01-01 RX ORDER — LORAZEPAM 2 MG/ML
1 INJECTION INTRAMUSCULAR ONCE
Status: COMPLETED | OUTPATIENT
Start: 2025-01-01 | End: 2025-01-01

## 2025-01-01 RX ORDER — PREGABALIN 25 MG/1
25 CAPSULE ORAL 2 TIMES DAILY
Status: DISCONTINUED | OUTPATIENT
Start: 2025-01-01 | End: 2025-01-01 | Stop reason: HOSPADM

## 2025-01-01 RX ORDER — HYDROMORPHONE HYDROCHLORIDE 2 MG/ML
1.5 INJECTION, SOLUTION INTRAMUSCULAR; INTRAVENOUS; SUBCUTANEOUS EVERY 4 HOURS
Status: DISCONTINUED | OUTPATIENT
Start: 2025-01-01 | End: 2025-01-01

## 2025-01-01 RX ORDER — HYDROMORPHONE HYDROCHLORIDE 2 MG/ML
2 INJECTION, SOLUTION INTRAMUSCULAR; INTRAVENOUS; SUBCUTANEOUS
Status: DISCONTINUED | OUTPATIENT
Start: 2025-01-01 | End: 2025-01-01

## 2025-01-01 RX ORDER — LEVOTHYROXINE SODIUM 150 UG/1
150 TABLET ORAL
Status: DISCONTINUED | OUTPATIENT
Start: 2025-01-01 | End: 2025-01-01

## 2025-01-01 RX ORDER — BISACODYL 10 MG
10 SUPPOSITORY, RECTAL RECTAL DAILY PRN
Status: DISCONTINUED | OUTPATIENT
Start: 2025-01-01 | End: 2025-01-01 | Stop reason: HOSPADM

## 2025-01-01 RX ORDER — ACETAMINOPHEN 650 MG/1
650 SUPPOSITORY RECTAL EVERY 4 HOURS PRN
Status: DISCONTINUED | OUTPATIENT
Start: 2025-01-01 | End: 2025-01-01 | Stop reason: HOSPADM

## 2025-01-01 RX ORDER — HYDROCODONE BITARTRATE AND ACETAMINOPHEN 10; 325 MG/1; MG/1
1 TABLET ORAL EVERY 6 HOURS PRN
Refills: 0 | Status: DISCONTINUED | OUTPATIENT
Start: 2025-01-01 | End: 2025-01-01 | Stop reason: HOSPADM

## 2025-01-01 RX ORDER — HEPARIN SODIUM 5000 [USP'U]/ML
5000 INJECTION, SOLUTION INTRAVENOUS; SUBCUTANEOUS EVERY 12 HOURS SCHEDULED
Status: DISCONTINUED | OUTPATIENT
Start: 2025-01-01 | End: 2025-01-01

## 2025-01-01 RX ORDER — LORAZEPAM 2 MG/ML
2 INJECTION INTRAMUSCULAR EVERY 4 HOURS
Status: DISCONTINUED | OUTPATIENT
Start: 2025-01-01 | End: 2025-01-01

## 2025-01-01 RX ORDER — CALCIUM CARBONATE 500 MG/1
2 TABLET, CHEWABLE ORAL 2 TIMES DAILY PRN
Status: DISCONTINUED | OUTPATIENT
Start: 2025-01-01 | End: 2025-01-01 | Stop reason: HOSPADM

## 2025-01-01 RX ORDER — BISACODYL 10 MG
10 SUPPOSITORY, RECTAL RECTAL DAILY PRN
Status: CANCELLED | OUTPATIENT
Start: 2025-01-01

## 2025-01-01 RX ORDER — DEXTROSE MONOHYDRATE 25 G/50ML
25 INJECTION, SOLUTION INTRAVENOUS
Status: DISCONTINUED | OUTPATIENT
Start: 2025-01-01 | End: 2025-01-01 | Stop reason: HOSPADM

## 2025-01-01 RX ORDER — ONDANSETRON 2 MG/ML
4 INJECTION INTRAMUSCULAR; INTRAVENOUS ONCE
Status: COMPLETED | OUTPATIENT
Start: 2025-01-01 | End: 2025-01-01

## 2025-01-01 RX ORDER — ATORVASTATIN CALCIUM 20 MG/1
80 TABLET, FILM COATED ORAL NIGHTLY
Status: DISCONTINUED | OUTPATIENT
Start: 2025-01-01 | End: 2025-01-01

## 2025-01-01 RX ORDER — HALOPERIDOL 5 MG/ML
1 INJECTION INTRAMUSCULAR EVERY 4 HOURS
Status: DISCONTINUED | OUTPATIENT
Start: 2025-01-01 | End: 2025-01-01

## 2025-01-01 RX ORDER — HYDROMORPHONE HYDROCHLORIDE 2 MG/ML
1.5 INJECTION, SOLUTION INTRAMUSCULAR; INTRAVENOUS; SUBCUTANEOUS
Status: CANCELLED | OUTPATIENT
Start: 2025-01-01 | End: 2025-05-31

## 2025-01-01 RX ORDER — BISOPROLOL FUMARATE 5 MG/1
10 TABLET, FILM COATED ORAL DAILY
Status: DISCONTINUED | OUTPATIENT
Start: 2025-01-01 | End: 2025-01-01

## 2025-01-01 RX ORDER — ONDANSETRON 2 MG/ML
4 INJECTION INTRAMUSCULAR; INTRAVENOUS EVERY 6 HOURS PRN
Status: DISCONTINUED | OUTPATIENT
Start: 2025-01-01 | End: 2025-01-01 | Stop reason: HOSPADM

## 2025-01-01 RX ORDER — HYDROMORPHONE HYDROCHLORIDE 1 MG/ML
0.5 INJECTION, SOLUTION INTRAMUSCULAR; INTRAVENOUS; SUBCUTANEOUS ONCE
Refills: 0 | Status: COMPLETED | OUTPATIENT
Start: 2025-01-01 | End: 2025-01-01

## 2025-01-01 RX ORDER — LORAZEPAM 2 MG/ML
2 INJECTION INTRAMUSCULAR
Status: CANCELLED | OUTPATIENT
Start: 2025-01-01

## 2025-01-01 RX ORDER — FERROUS SULFATE 325(65) MG
325 TABLET ORAL EVERY OTHER DAY
Status: DISCONTINUED | OUTPATIENT
Start: 2025-01-01 | End: 2025-01-01

## 2025-01-01 RX ORDER — LORAZEPAM 2 MG/ML
2 INJECTION INTRAMUSCULAR
Status: DISCONTINUED | OUTPATIENT
Start: 2025-01-01 | End: 2025-05-30 | Stop reason: HOSPADM

## 2025-01-01 RX ORDER — ONDANSETRON 4 MG/1
4 TABLET, ORALLY DISINTEGRATING ORAL EVERY 6 HOURS PRN
Status: DISCONTINUED | OUTPATIENT
Start: 2025-01-01 | End: 2025-01-01 | Stop reason: HOSPADM

## 2025-01-01 RX ORDER — BISACODYL 10 MG
10 SUPPOSITORY, RECTAL RECTAL DAILY PRN
Status: DISCONTINUED | OUTPATIENT
Start: 2025-01-01 | End: 2025-05-30 | Stop reason: HOSPADM

## 2025-01-01 RX ORDER — BISACODYL 5 MG/1
5 TABLET, DELAYED RELEASE ORAL DAILY PRN
Status: CANCELLED | OUTPATIENT
Start: 2025-01-01

## 2025-01-01 RX ORDER — SODIUM CHLORIDE 0.9 % (FLUSH) 0.9 %
10 SYRINGE (ML) INJECTION EVERY 12 HOURS SCHEDULED
Status: DISCONTINUED | OUTPATIENT
Start: 2025-01-01 | End: 2025-01-01

## 2025-01-01 RX ORDER — POLYETHYLENE GLYCOL 3350 17 G/17G
17 POWDER, FOR SOLUTION ORAL DAILY PRN
Status: DISCONTINUED | OUTPATIENT
Start: 2025-01-01 | End: 2025-05-30 | Stop reason: HOSPADM

## 2025-01-01 RX ORDER — ONDANSETRON 2 MG/ML
4 INJECTION INTRAMUSCULAR; INTRAVENOUS EVERY 6 HOURS PRN
Status: DISCONTINUED | OUTPATIENT
Start: 2025-01-01 | End: 2025-05-30 | Stop reason: HOSPADM

## 2025-01-01 RX ORDER — ONDANSETRON 2 MG/ML
4 INJECTION INTRAMUSCULAR; INTRAVENOUS EVERY 6 HOURS PRN
Status: CANCELLED | OUTPATIENT
Start: 2025-01-01

## 2025-01-01 RX ORDER — CALCIUM CARBONATE 500 MG/1
2 TABLET, CHEWABLE ORAL 2 TIMES DAILY PRN
Status: DISCONTINUED | OUTPATIENT
Start: 2025-01-01 | End: 2025-05-30 | Stop reason: HOSPADM

## 2025-01-01 RX ORDER — PREGABALIN 25 MG/1
25 CAPSULE ORAL 2 TIMES DAILY
Status: DISCONTINUED | OUTPATIENT
Start: 2025-01-01 | End: 2025-01-01

## 2025-01-01 RX ORDER — AMOXICILLIN 250 MG
2 CAPSULE ORAL 2 TIMES DAILY PRN
Status: DISCONTINUED | OUTPATIENT
Start: 2025-01-01 | End: 2025-01-01 | Stop reason: HOSPADM

## 2025-01-01 RX ORDER — HYDRALAZINE HYDROCHLORIDE 25 MG/1
100 TABLET, FILM COATED ORAL EVERY 8 HOURS SCHEDULED
Status: DISCONTINUED | OUTPATIENT
Start: 2025-01-01 | End: 2025-01-01

## 2025-01-01 RX ORDER — IBUPROFEN 600 MG/1
1 TABLET ORAL
Status: DISCONTINUED | OUTPATIENT
Start: 2025-01-01 | End: 2025-01-01 | Stop reason: HOSPADM

## 2025-01-01 RX ORDER — HYDROMORPHONE HYDROCHLORIDE 2 MG/ML
1.5 INJECTION, SOLUTION INTRAMUSCULAR; INTRAVENOUS; SUBCUTANEOUS
Status: DISCONTINUED | OUTPATIENT
Start: 2025-01-01 | End: 2025-01-01

## 2025-01-01 RX ORDER — DIPHENHYDRAMINE HYDROCHLORIDE 50 MG/ML
12.5 INJECTION, SOLUTION INTRAMUSCULAR; INTRAVENOUS EVERY 6 HOURS PRN
Status: CANCELLED | OUTPATIENT
Start: 2025-01-01

## 2025-01-01 RX ORDER — ACETAMINOPHEN 325 MG/1
650 TABLET ORAL EVERY 4 HOURS PRN
Status: DISCONTINUED | OUTPATIENT
Start: 2025-01-01 | End: 2025-01-01 | Stop reason: HOSPADM

## 2025-01-01 RX ORDER — CALCIUM CARBONATE 500 MG/1
2 TABLET, CHEWABLE ORAL 2 TIMES DAILY PRN
Status: CANCELLED | OUTPATIENT
Start: 2025-01-01

## 2025-01-01 RX ORDER — POLYETHYLENE GLYCOL 3350 17 G/17G
17 POWDER, FOR SOLUTION ORAL DAILY PRN
Status: DISCONTINUED | OUTPATIENT
Start: 2025-01-01 | End: 2025-01-01 | Stop reason: HOSPADM

## 2025-01-01 RX ORDER — AMOXICILLIN 250 MG
2 CAPSULE ORAL 2 TIMES DAILY PRN
Status: CANCELLED | OUTPATIENT
Start: 2025-01-01

## 2025-01-01 RX ORDER — LORAZEPAM 2 MG/ML
1 INJECTION INTRAMUSCULAR
Status: DISCONTINUED | OUTPATIENT
Start: 2025-01-01 | End: 2025-01-01 | Stop reason: HOSPADM

## 2025-01-01 RX ORDER — SODIUM CHLORIDE 9 MG/ML
40 INJECTION, SOLUTION INTRAVENOUS AS NEEDED
Status: CANCELLED | OUTPATIENT
Start: 2025-01-01

## 2025-01-01 RX ORDER — BISACODYL 5 MG/1
5 TABLET, DELAYED RELEASE ORAL DAILY PRN
Status: DISCONTINUED | OUTPATIENT
Start: 2025-01-01 | End: 2025-01-01 | Stop reason: HOSPADM

## 2025-01-01 RX ORDER — ACETAMINOPHEN 160 MG/5ML
650 SOLUTION ORAL EVERY 4 HOURS PRN
Status: DISCONTINUED | OUTPATIENT
Start: 2025-01-01 | End: 2025-01-01 | Stop reason: HOSPADM

## 2025-01-01 RX ORDER — GLYCOPYRROLATE 0.2 MG/ML
0.4 INJECTION INTRAMUSCULAR; INTRAVENOUS EVERY 4 HOURS PRN
Status: DISCONTINUED | OUTPATIENT
Start: 2025-01-01 | End: 2025-05-30 | Stop reason: HOSPADM

## 2025-01-01 RX ORDER — LORAZEPAM 2 MG/ML
2 INJECTION INTRAMUSCULAR EVERY 4 HOURS
Status: DISCONTINUED | OUTPATIENT
Start: 2025-01-01 | End: 2025-05-30 | Stop reason: HOSPADM

## 2025-01-01 RX ORDER — BISACODYL 5 MG/1
5 TABLET, DELAYED RELEASE ORAL DAILY PRN
Status: DISCONTINUED | OUTPATIENT
Start: 2025-01-01 | End: 2025-05-30 | Stop reason: HOSPADM

## 2025-01-01 RX ORDER — POLYETHYLENE GLYCOL 3350 17 G/17G
17 POWDER, FOR SOLUTION ORAL DAILY PRN
Status: CANCELLED | OUTPATIENT
Start: 2025-01-01

## 2025-01-01 RX ORDER — HYDROMORPHONE HYDROCHLORIDE 2 MG/ML
2 INJECTION, SOLUTION INTRAMUSCULAR; INTRAVENOUS; SUBCUTANEOUS EVERY 4 HOURS
Status: DISCONTINUED | OUTPATIENT
Start: 2025-01-01 | End: 2025-05-30 | Stop reason: HOSPADM

## 2025-01-01 RX ORDER — SODIUM BICARBONATE 650 MG/1
650 TABLET ORAL 3 TIMES DAILY
Status: DISCONTINUED | OUTPATIENT
Start: 2025-01-01 | End: 2025-01-01

## 2025-01-01 RX ORDER — NICOTINE POLACRILEX 4 MG
15 LOZENGE BUCCAL
Status: DISCONTINUED | OUTPATIENT
Start: 2025-01-01 | End: 2025-01-01 | Stop reason: HOSPADM

## 2025-01-01 RX ADMIN — HYDROMORPHONE HYDROCHLORIDE 1.5 MG: 2 INJECTION, SOLUTION INTRAMUSCULAR; INTRAVENOUS; SUBCUTANEOUS at 13:23

## 2025-01-01 RX ADMIN — LORAZEPAM 2 MG: 2 INJECTION INTRAMUSCULAR; INTRAVENOUS at 17:22

## 2025-01-01 RX ADMIN — PREGABALIN 25 MG: 25 CAPSULE ORAL at 08:34

## 2025-01-01 RX ADMIN — HYDROMORPHONE HYDROCHLORIDE 0.5 MG: 1 INJECTION, SOLUTION INTRAMUSCULAR; INTRAVENOUS; SUBCUTANEOUS at 16:39

## 2025-01-01 RX ADMIN — HYDROMORPHONE HYDROCHLORIDE 0.5 MG: 1 INJECTION, SOLUTION INTRAMUSCULAR; INTRAVENOUS; SUBCUTANEOUS at 12:22

## 2025-01-01 RX ADMIN — SODIUM BICARBONATE 650 MG TABLET 650 MG: at 21:41

## 2025-01-01 RX ADMIN — HYDROMORPHONE HYDROCHLORIDE 1 MG: 1 INJECTION, SOLUTION INTRAMUSCULAR; INTRAVENOUS; SUBCUTANEOUS at 18:44

## 2025-01-01 RX ADMIN — HYDROXYZINE HYDROCHLORIDE 25 MG: 25 TABLET, FILM COATED ORAL at 22:54

## 2025-01-01 RX ADMIN — LORAZEPAM 2 MG: 2 INJECTION INTRAMUSCULAR; INTRAVENOUS at 18:30

## 2025-01-01 RX ADMIN — Medication 10 ML: at 21:42

## 2025-01-01 RX ADMIN — LORAZEPAM 2 MG: 2 INJECTION INTRAMUSCULAR; INTRAVENOUS at 13:01

## 2025-01-01 RX ADMIN — LORAZEPAM 1 MG: 2 INJECTION INTRAMUSCULAR; INTRAVENOUS at 08:21

## 2025-01-01 RX ADMIN — LORAZEPAM 1 MG: 2 INJECTION INTRAMUSCULAR; INTRAVENOUS at 09:18

## 2025-01-01 RX ADMIN — Medication 10 ML: at 21:07

## 2025-01-01 RX ADMIN — LORAZEPAM 1 MG: 2 INJECTION INTRAMUSCULAR; INTRAVENOUS at 02:28

## 2025-01-01 RX ADMIN — SODIUM BICARBONATE 650 MG TABLET 650 MG: at 22:00

## 2025-01-01 RX ADMIN — SODIUM BICARBONATE 650 MG TABLET 650 MG: at 09:45

## 2025-01-01 RX ADMIN — HYDROMORPHONE HYDROCHLORIDE 1.5 MG: 2 INJECTION INTRAMUSCULAR; INTRAVENOUS; SUBCUTANEOUS at 09:59

## 2025-01-01 RX ADMIN — HALOPERIDOL LACTATE 1 MG: 5 INJECTION, SOLUTION INTRAMUSCULAR at 13:00

## 2025-01-01 RX ADMIN — HYDROCODONE BITARTRATE AND ACETAMINOPHEN 1 TABLET: 10; 325 TABLET ORAL at 16:29

## 2025-01-01 RX ADMIN — Medication 10 ML: at 08:06

## 2025-01-01 RX ADMIN — LORAZEPAM 1 MG: 2 INJECTION INTRAMUSCULAR; INTRAVENOUS at 13:21

## 2025-01-01 RX ADMIN — HYDROMORPHONE HYDROCHLORIDE 0.5 MG: 1 INJECTION, SOLUTION INTRAMUSCULAR; INTRAVENOUS; SUBCUTANEOUS at 12:09

## 2025-01-01 RX ADMIN — PIPERACILLIN AND TAZOBACTAM 3.38 G: 3; .375 INJECTION, POWDER, FOR SOLUTION INTRAVENOUS at 21:40

## 2025-01-01 RX ADMIN — Medication 10 ML: at 10:30

## 2025-01-01 RX ADMIN — HYDROMORPHONE HYDROCHLORIDE 1.5 MG: 2 INJECTION INTRAMUSCULAR; INTRAVENOUS; SUBCUTANEOUS at 08:33

## 2025-01-01 RX ADMIN — HYDROMORPHONE HYDROCHLORIDE 1 MG: 1 INJECTION, SOLUTION INTRAMUSCULAR; INTRAVENOUS; SUBCUTANEOUS at 23:22

## 2025-01-01 RX ADMIN — HEPARIN SODIUM 5000 UNITS: 5000 INJECTION INTRAVENOUS; SUBCUTANEOUS at 23:08

## 2025-01-01 RX ADMIN — INSULIN GLARGINE 20 UNITS: 100 INJECTION, SOLUTION SUBCUTANEOUS at 21:41

## 2025-01-01 RX ADMIN — BISOPROLOL FUMARATE 10 MG: 5 TABLET ORAL at 09:31

## 2025-01-01 RX ADMIN — HYDRALAZINE HYDROCHLORIDE 100 MG: 25 TABLET ORAL at 23:08

## 2025-01-01 RX ADMIN — PANTOPRAZOLE SODIUM 40 MG: 40 TABLET, DELAYED RELEASE ORAL at 06:21

## 2025-01-01 RX ADMIN — HYDROCODONE BITARTRATE AND ACETAMINOPHEN 1 TABLET: 10; 325 TABLET ORAL at 11:55

## 2025-01-01 RX ADMIN — INSULIN LISPRO 2 UNITS: 100 INJECTION, SOLUTION INTRAVENOUS; SUBCUTANEOUS at 21:05

## 2025-01-01 RX ADMIN — HYDROMORPHONE HYDROCHLORIDE 1 MG: 1 INJECTION, SOLUTION INTRAMUSCULAR; INTRAVENOUS; SUBCUTANEOUS at 16:23

## 2025-01-01 RX ADMIN — LEVOTHYROXINE SODIUM 150 MCG: 150 TABLET ORAL at 06:20

## 2025-01-01 RX ADMIN — LORAZEPAM 2 MG: 2 INJECTION INTRAMUSCULAR; INTRAVENOUS at 22:05

## 2025-01-01 RX ADMIN — PREGABALIN 25 MG: 25 CAPSULE ORAL at 08:21

## 2025-01-01 RX ADMIN — PIPERACILLIN AND TAZOBACTAM 3.38 G: 3; .375 INJECTION, POWDER, FOR SOLUTION INTRAVENOUS at 23:37

## 2025-01-01 RX ADMIN — HYDRALAZINE HYDROCHLORIDE 100 MG: 25 TABLET ORAL at 06:20

## 2025-01-01 RX ADMIN — PREGABALIN 25 MG: 25 CAPSULE ORAL at 20:08

## 2025-01-01 RX ADMIN — HYDROMORPHONE HYDROCHLORIDE 1 MG: 1 INJECTION, SOLUTION INTRAMUSCULAR; INTRAVENOUS; SUBCUTANEOUS at 08:21

## 2025-01-01 RX ADMIN — LORAZEPAM 1 MG: 2 INJECTION INTRAMUSCULAR; INTRAVENOUS at 00:08

## 2025-01-01 RX ADMIN — HYDROMORPHONE HYDROCHLORIDE 1.5 MG: 2 INJECTION, SOLUTION INTRAMUSCULAR; INTRAVENOUS; SUBCUTANEOUS at 15:49

## 2025-01-01 RX ADMIN — INSULIN GLARGINE 20 UNITS: 100 INJECTION, SOLUTION SUBCUTANEOUS at 09:35

## 2025-01-01 RX ADMIN — LEVOTHYROXINE SODIUM 150 MCG: 150 TABLET ORAL at 06:29

## 2025-01-01 RX ADMIN — PREGABALIN 25 MG: 25 CAPSULE ORAL at 11:55

## 2025-01-01 RX ADMIN — LORAZEPAM 1 MG: 2 INJECTION INTRAMUSCULAR; INTRAVENOUS at 15:49

## 2025-01-01 RX ADMIN — PREGABALIN 25 MG: 25 CAPSULE ORAL at 22:00

## 2025-01-01 RX ADMIN — LORAZEPAM 1 MG: 2 INJECTION INTRAMUSCULAR; INTRAVENOUS at 10:22

## 2025-01-01 RX ADMIN — PIPERACILLIN AND TAZOBACTAM 3.38 G: 3; .375 INJECTION, POWDER, FOR SOLUTION INTRAVENOUS at 10:30

## 2025-01-01 RX ADMIN — LORAZEPAM 1 MG: 2 INJECTION INTRAMUSCULAR; INTRAVENOUS at 08:34

## 2025-01-01 RX ADMIN — HYDROMORPHONE HYDROCHLORIDE 1.5 MG: 2 INJECTION, SOLUTION INTRAMUSCULAR; INTRAVENOUS; SUBCUTANEOUS at 10:19

## 2025-01-01 RX ADMIN — FERROUS SULFATE TAB 325 MG (65 MG ELEMENTAL FE) 325 MG: 325 (65 FE) TAB at 11:54

## 2025-01-01 RX ADMIN — HYDROMORPHONE HYDROCHLORIDE 1 MG: 1 INJECTION, SOLUTION INTRAMUSCULAR; INTRAVENOUS; SUBCUTANEOUS at 11:22

## 2025-01-01 RX ADMIN — INSULIN GLARGINE 20 UNITS: 100 INJECTION, SOLUTION SUBCUTANEOUS at 21:05

## 2025-01-01 RX ADMIN — Medication 10 ML: at 09:00

## 2025-01-01 RX ADMIN — HYDROCODONE BITARTRATE AND ACETAMINOPHEN 1 TABLET: 10; 325 TABLET ORAL at 09:32

## 2025-01-01 RX ADMIN — HYDROMORPHONE HYDROCHLORIDE 1.5 MG: 2 INJECTION, SOLUTION INTRAMUSCULAR; INTRAVENOUS; SUBCUTANEOUS at 18:31

## 2025-01-01 RX ADMIN — PANTOPRAZOLE SODIUM 40 MG: 40 TABLET, DELAYED RELEASE ORAL at 06:29

## 2025-01-01 RX ADMIN — LORAZEPAM 1 MG: 2 INJECTION INTRAMUSCULAR; INTRAVENOUS at 13:19

## 2025-01-01 RX ADMIN — FLUTICASONE PROPIONATE 2 SPRAY: 50 SPRAY, METERED NASAL at 09:30

## 2025-01-01 RX ADMIN — HYDROMORPHONE HYDROCHLORIDE 1.5 MG: 2 INJECTION, SOLUTION INTRAMUSCULAR; INTRAVENOUS; SUBCUTANEOUS at 03:35

## 2025-01-01 RX ADMIN — HALOPERIDOL LACTATE 1 MG: 5 INJECTION, SOLUTION INTRAMUSCULAR at 17:22

## 2025-01-01 RX ADMIN — SODIUM BICARBONATE 650 MG TABLET 650 MG: at 09:32

## 2025-01-01 RX ADMIN — HYDROMORPHONE HYDROCHLORIDE 1.5 MG: 2 INJECTION, SOLUTION INTRAMUSCULAR; INTRAVENOUS; SUBCUTANEOUS at 02:27

## 2025-01-01 RX ADMIN — HYDROMORPHONE HYDROCHLORIDE 1.5 MG: 2 INJECTION, SOLUTION INTRAMUSCULAR; INTRAVENOUS; SUBCUTANEOUS at 00:34

## 2025-01-01 RX ADMIN — HYDROMORPHONE HYDROCHLORIDE 1.5 MG: 2 INJECTION, SOLUTION INTRAMUSCULAR; INTRAVENOUS; SUBCUTANEOUS at 02:08

## 2025-01-01 RX ADMIN — HYDROMORPHONE HYDROCHLORIDE 0.5 MG: 1 INJECTION, SOLUTION INTRAMUSCULAR; INTRAVENOUS; SUBCUTANEOUS at 21:05

## 2025-01-01 RX ADMIN — HYDROMORPHONE HYDROCHLORIDE 1 MG: 1 INJECTION, SOLUTION INTRAMUSCULAR; INTRAVENOUS; SUBCUTANEOUS at 09:58

## 2025-01-01 RX ADMIN — PREGABALIN 25 MG: 25 CAPSULE ORAL at 09:45

## 2025-01-01 RX ADMIN — HYDROCODONE BITARTRATE AND ACETAMINOPHEN 1 TABLET: 10; 325 TABLET ORAL at 09:45

## 2025-01-01 RX ADMIN — LORAZEPAM 2 MG: 2 INJECTION INTRAMUSCULAR; INTRAVENOUS at 05:50

## 2025-01-01 RX ADMIN — LORAZEPAM 2 MG: 2 INJECTION INTRAMUSCULAR; INTRAVENOUS at 02:07

## 2025-01-01 RX ADMIN — LORAZEPAM 1 MG: 2 INJECTION INTRAMUSCULAR; INTRAVENOUS at 23:21

## 2025-01-01 RX ADMIN — HEPARIN SODIUM 5000 UNITS: 5000 INJECTION INTRAVENOUS; SUBCUTANEOUS at 21:40

## 2025-01-01 RX ADMIN — PANTOPRAZOLE SODIUM 40 MG: 40 TABLET, DELAYED RELEASE ORAL at 11:55

## 2025-01-01 RX ADMIN — HEPARIN SODIUM 5000 UNITS: 5000 INJECTION INTRAVENOUS; SUBCUTANEOUS at 16:27

## 2025-01-01 RX ADMIN — ONDANSETRON 4 MG: 2 INJECTION, SOLUTION INTRAMUSCULAR; INTRAVENOUS at 03:09

## 2025-01-01 RX ADMIN — HYDROMORPHONE HYDROCHLORIDE 1 MG: 1 INJECTION, SOLUTION INTRAMUSCULAR; INTRAVENOUS; SUBCUTANEOUS at 13:20

## 2025-01-01 RX ADMIN — HYDROMORPHONE HYDROCHLORIDE 2 MG: 2 INJECTION, SOLUTION INTRAMUSCULAR; INTRAVENOUS; SUBCUTANEOUS at 20:59

## 2025-01-01 RX ADMIN — HYDROMORPHONE HYDROCHLORIDE 2 MG: 2 INJECTION, SOLUTION INTRAMUSCULAR; INTRAVENOUS; SUBCUTANEOUS at 13:01

## 2025-01-01 RX ADMIN — HYDROXYZINE HYDROCHLORIDE 25 MG: 25 TABLET, FILM COATED ORAL at 05:21

## 2025-01-01 RX ADMIN — HYDROCODONE BITARTRATE AND ACETAMINOPHEN 1 TABLET: 10; 325 TABLET ORAL at 14:51

## 2025-01-01 RX ADMIN — PREGABALIN 25 MG: 25 CAPSULE ORAL at 21:40

## 2025-01-01 RX ADMIN — HEPARIN SODIUM 5000 UNITS: 5000 INJECTION INTRAVENOUS; SUBCUTANEOUS at 09:42

## 2025-01-01 RX ADMIN — SODIUM BICARBONATE 650 MG TABLET 650 MG: at 16:27

## 2025-01-01 RX ADMIN — HYDROMORPHONE HYDROCHLORIDE 1.5 MG: 2 INJECTION, SOLUTION INTRAMUSCULAR; INTRAVENOUS; SUBCUTANEOUS at 20:08

## 2025-01-01 RX ADMIN — DIPHENHYDRAMINE HYDROCHLORIDE 12.5 MG: 50 INJECTION, SOLUTION INTRAMUSCULAR; INTRAVENOUS at 16:23

## 2025-01-01 RX ADMIN — HALOPERIDOL LACTATE 1 MG: 5 INJECTION, SOLUTION INTRAMUSCULAR at 21:00

## 2025-01-01 RX ADMIN — LORAZEPAM 1 MG: 2 INJECTION INTRAMUSCULAR; INTRAVENOUS at 05:12

## 2025-01-01 RX ADMIN — PIPERACILLIN AND TAZOBACTAM 3.38 G: 3; .375 INJECTION, POWDER, FOR SOLUTION INTRAVENOUS at 09:41

## 2025-01-01 RX ADMIN — ATORVASTATIN CALCIUM 80 MG: 20 TABLET, FILM COATED ORAL at 21:40

## 2025-01-01 RX ADMIN — HYDRALAZINE HYDROCHLORIDE 100 MG: 25 TABLET ORAL at 16:26

## 2025-01-01 RX ADMIN — LORAZEPAM 2 MG: 2 INJECTION INTRAMUSCULAR; INTRAVENOUS at 20:59

## 2025-01-01 RX ADMIN — HYDROXYZINE HYDROCHLORIDE 25 MG: 25 TABLET, FILM COATED ORAL at 14:51

## 2025-01-01 RX ADMIN — FLUTICASONE PROPIONATE 2 SPRAY: 50 SPRAY, METERED NASAL at 09:43

## 2025-01-01 RX ADMIN — INSULIN GLARGINE 20 UNITS: 100 INJECTION, SOLUTION SUBCUTANEOUS at 11:54

## 2025-01-01 RX ADMIN — SODIUM BICARBONATE 650 MG TABLET 650 MG: at 16:15

## 2025-01-01 RX ADMIN — INSULIN GLARGINE 20 UNITS: 100 INJECTION, SOLUTION SUBCUTANEOUS at 09:42

## 2025-01-01 RX ADMIN — PREGABALIN 25 MG: 25 CAPSULE ORAL at 20:46

## 2025-01-01 RX ADMIN — VANCOMYCIN HYDROCHLORIDE 2250 MG: 10 INJECTION, POWDER, LYOPHILIZED, FOR SOLUTION INTRAVENOUS at 05:01

## 2025-01-01 RX ADMIN — HYDROMORPHONE HYDROCHLORIDE 0.5 MG: 1 INJECTION, SOLUTION INTRAMUSCULAR; INTRAVENOUS; SUBCUTANEOUS at 03:09

## 2025-01-01 RX ADMIN — BISOPROLOL FUMARATE 10 MG: 5 TABLET ORAL at 09:45

## 2025-01-01 RX ADMIN — PREGABALIN 25 MG: 25 CAPSULE ORAL at 20:18

## 2025-01-01 RX ADMIN — LORAZEPAM 2 MG: 2 INJECTION INTRAMUSCULAR; INTRAVENOUS at 08:33

## 2025-01-01 RX ADMIN — LORAZEPAM 1 MG: 2 INJECTION INTRAMUSCULAR; INTRAVENOUS at 00:34

## 2025-01-01 RX ADMIN — LEVOTHYROXINE SODIUM 150 MCG: 150 TABLET ORAL at 11:55

## 2025-01-01 RX ADMIN — HYDROMORPHONE HYDROCHLORIDE 2 MG: 2 INJECTION, SOLUTION INTRAMUSCULAR; INTRAVENOUS; SUBCUTANEOUS at 17:22

## 2025-01-01 RX ADMIN — HYDROMORPHONE HYDROCHLORIDE 0.5 MG: 1 INJECTION, SOLUTION INTRAMUSCULAR; INTRAVENOUS; SUBCUTANEOUS at 20:54

## 2025-01-01 RX ADMIN — HYDROMORPHONE HYDROCHLORIDE 1 MG: 1 INJECTION, SOLUTION INTRAMUSCULAR; INTRAVENOUS; SUBCUTANEOUS at 08:06

## 2025-01-01 RX ADMIN — HYDROXYZINE HYDROCHLORIDE 25 MG: 25 TABLET, FILM COATED ORAL at 00:08

## 2025-01-01 RX ADMIN — SODIUM CHLORIDE 500 ML: 9 INJECTION, SOLUTION INTRAVENOUS at 04:19

## 2025-01-01 RX ADMIN — LORAZEPAM 1 MG: 2 INJECTION INTRAMUSCULAR; INTRAVENOUS at 03:07

## 2025-01-01 RX ADMIN — HYDROMORPHONE HYDROCHLORIDE 0.5 MG: 1 INJECTION, SOLUTION INTRAMUSCULAR; INTRAVENOUS; SUBCUTANEOUS at 00:08

## 2025-01-01 RX ADMIN — HYDROMORPHONE HYDROCHLORIDE 0.5 MG: 1 INJECTION, SOLUTION INTRAMUSCULAR; INTRAVENOUS; SUBCUTANEOUS at 06:35

## 2025-01-01 RX ADMIN — PREGABALIN 25 MG: 25 CAPSULE ORAL at 20:15

## 2025-01-01 RX ADMIN — ATORVASTATIN CALCIUM 80 MG: 20 TABLET, FILM COATED ORAL at 22:00

## 2025-01-01 RX ADMIN — HYDROMORPHONE HYDROCHLORIDE 1.5 MG: 2 INJECTION, SOLUTION INTRAMUSCULAR; INTRAVENOUS; SUBCUTANEOUS at 17:07

## 2025-01-01 RX ADMIN — HYDROMORPHONE HYDROCHLORIDE 1.5 MG: 2 INJECTION, SOLUTION INTRAMUSCULAR; INTRAVENOUS; SUBCUTANEOUS at 05:50

## 2025-01-01 RX ADMIN — FERROUS SULFATE TAB 325 MG (65 MG ELEMENTAL FE) 325 MG: 325 (65 FE) TAB at 09:32

## 2025-01-01 RX ADMIN — PREGABALIN 25 MG: 25 CAPSULE ORAL at 09:32

## 2025-01-01 RX ADMIN — HYDROMORPHONE HYDROCHLORIDE 1.5 MG: 2 INJECTION, SOLUTION INTRAMUSCULAR; INTRAVENOUS; SUBCUTANEOUS at 22:05

## 2025-01-01 RX ADMIN — SODIUM BICARBONATE 650 MG TABLET 650 MG: at 11:54

## 2025-01-01 RX ADMIN — LORAZEPAM 1 MG: 2 INJECTION INTRAMUSCULAR; INTRAVENOUS at 20:08

## 2025-01-01 RX ADMIN — HYDROMORPHONE HYDROCHLORIDE 1 MG: 1 INJECTION, SOLUTION INTRAMUSCULAR; INTRAVENOUS; SUBCUTANEOUS at 20:15

## 2025-01-01 RX ADMIN — LORAZEPAM 1 MG: 2 INJECTION INTRAMUSCULAR; INTRAVENOUS at 17:07

## 2025-01-01 RX ADMIN — LORAZEPAM 2 MG: 2 INJECTION INTRAMUSCULAR; INTRAVENOUS at 09:58

## 2025-01-01 RX ADMIN — HYDROMORPHONE HYDROCHLORIDE 1 MG: 1 INJECTION, SOLUTION INTRAMUSCULAR; INTRAVENOUS; SUBCUTANEOUS at 05:12

## 2025-01-01 RX ADMIN — HYDROMORPHONE HYDROCHLORIDE 0.5 MG: 1 INJECTION, SOLUTION INTRAMUSCULAR; INTRAVENOUS; SUBCUTANEOUS at 21:55

## 2025-01-01 RX ADMIN — HYDROMORPHONE HYDROCHLORIDE 1.5 MG: 2 INJECTION, SOLUTION INTRAMUSCULAR; INTRAVENOUS; SUBCUTANEOUS at 08:34

## 2025-01-01 RX ADMIN — HYDROCODONE BITARTRATE AND ACETAMINOPHEN 1 TABLET: 10; 325 TABLET ORAL at 22:53

## 2025-01-01 RX ADMIN — PIPERACILLIN AND TAZOBACTAM 3.38 G: 3; .375 INJECTION, POWDER, FOR SOLUTION INTRAVENOUS at 04:17

## 2025-01-03 ENCOUNTER — TELEPHONE (OUTPATIENT)
Dept: CASE MANAGEMENT | Facility: OTHER | Age: 85
End: 2025-01-03
Payer: MEDICARE

## 2025-01-03 DIAGNOSIS — Z79.4 TYPE 2 DIABETES MELLITUS WITH HYPERGLYCEMIA, WITH LONG-TERM CURRENT USE OF INSULIN: ICD-10-CM

## 2025-01-03 DIAGNOSIS — E78.5 HYPERLIPIDEMIA, UNSPECIFIED HYPERLIPIDEMIA TYPE: ICD-10-CM

## 2025-01-03 DIAGNOSIS — L03.116 BILATERAL CELLULITIS OF LOWER LEG: ICD-10-CM

## 2025-01-03 DIAGNOSIS — L03.115 BILATERAL CELLULITIS OF LOWER LEG: ICD-10-CM

## 2025-01-03 DIAGNOSIS — R53.1 GENERALIZED WEAKNESS: Primary | ICD-10-CM

## 2025-01-03 DIAGNOSIS — E11.65 TYPE 2 DIABETES MELLITUS WITH HYPERGLYCEMIA, WITH LONG-TERM CURRENT USE OF INSULIN: ICD-10-CM

## 2025-01-07 ENCOUNTER — TELEPHONE (OUTPATIENT)
Dept: ONCOLOGY | Facility: CLINIC | Age: 85
End: 2025-01-07

## 2025-01-07 ENCOUNTER — TELEPHONE (OUTPATIENT)
Dept: CASE MANAGEMENT | Facility: OTHER | Age: 85
End: 2025-01-07
Payer: MEDICARE

## 2025-01-07 NOTE — TELEPHONE ENCOUNTER
Caller: Halie Guidry    Relationship: Self    Best call back number: [phone]     What is the best time to reach you: ASAP    Who are you requesting to speak with (clinical staff, provider,  specific staff member):         What was the call regarding: PLEASE CALL PT TO VERIFY THAT HER CT SCAN THIS THURSDAY WILL BE COVERED BY INSURANCE.  SHE SAYS SHE SPOKE WITH SOMEONE LAST WEEK, A HARD COPY OF APPT INFO WAS BEING MAILED TO HER BUT SHE DOES NOT HAVE IT.  SHE IS CONCERNED AND WANTING TO VERIFY THAT THE CT SCAN WILL BE COVERED BY INSURANCE SINCE THE PET SCAN HAD BEEN DENIED.  SHE SAYS SHE SPOKE WITH SOMEONE YESTERDAY BUT THEY COULD NOT VERIFY IF INSURANCE WOULD COVER THIS.  PLEASE CALL PT TO ADVISE.

## 2025-01-08 ENCOUNTER — HOSPITAL ENCOUNTER (OUTPATIENT)
Dept: PET IMAGING | Facility: HOSPITAL | Age: 85
Discharge: HOME | End: 2025-01-08
Payer: MEDICARE

## 2025-01-08 ENCOUNTER — TELEPHONE (OUTPATIENT)
Dept: FAMILY MEDICINE CLINIC | Facility: CLINIC | Age: 85
End: 2025-01-08
Payer: MEDICARE

## 2025-01-08 NOTE — TELEPHONE ENCOUNTER
FORMS HAVE BEEN RECEIVED FROM [facility] REGARDING POWER WHEELCHAIR  PT MUST SCHEDULE AN APPOINTMENT BEFORE FORMS CAN BE COMPLETED PER TEA.   PT HAS NOT SCHEDULED AN APPOINTMENT AS OF 01/08/2025

## 2025-01-09 ENCOUNTER — TELEPHONE (OUTPATIENT)
Dept: ONCOLOGY | Facility: CLINIC | Age: 85
End: 2025-01-09
Payer: MEDICARE

## 2025-01-09 NOTE — TELEPHONE ENCOUNTER
Caller: JAREK KELLY    Relationship to patient:     Best call back number: [phone]    Chief complaint: FACILITY CALLED TO RESCHEDULE     Type of visit: LAB, FOLLOW UP 1 AND INFUSION     Requested date: AFTER 1-28-25     If rescheduling, when is the original appointment: 1-13-25

## 2025-01-13 NOTE — PROGRESS NOTES
Subjective   Halie Guidry is a 84 y.o. female.     CC: VV for Mobility Conversation    History of Present Illness     Patient seen today on a video visit to discuss a power wheelchair due to her myriad medical issues, including congestive heart failure, previous diabetic foot ulcer, peripheral neuropathy, previous lower extremity cellulitis, global weakness/debility, previous spinal compression fracture, and chronic bilateral lower extremity 3-4+ edema, requiring elevation.     On a side note, patient reports having a current lower extremity infection, that home health is strongly encouraged her to go today to the emergency room, due to sepsis possibility due to cellulitis, and I strongly encourage patient to do so also.    The following portions of the patient's history were reviewed and updated as appropriate: allergies, current medications, past family history, past medical history, past social history, past surgical history, and problem list.    Review of Systems   Constitutional:  Negative for activity change, chills and fever.   Respiratory:  Negative for cough.    Cardiovascular:  Negative for chest pain.   Psychiatric/Behavioral:  Negative for dysphoric mood.        Objective   Physical Exam  Constitutional:       General: She is not in acute distress.     Appearance: She is well-developed.   Pulmonary:      Effort: Pulmonary effort is normal.   Neurological:      Mental Status: She is alert and oriented to person, place, and time.   Psychiatric:         Behavior: Behavior normal.         Thought Content: Thought content normal.         Assessment & Plan   Diagnoses and all orders for this visit:    1. Congestive heart failure, unspecified HF chronicity, unspecified heart failure type (Primary)    2. 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 Weakness of both lower extremities    4. Thoracic degenerative disc disease    5. Spinal stenosis of lumbar region with neurogenic  claudication    6. Peripheral edema    Will write prescription for a motorized chair for patient due to the above-noted medical issues as this would greatly improve her lifestyle and hopefully decrease her risk of repeated cellulitis due to chronic peripheral edema along with this lift of her legs on the chair.    Spent  15   minutes with chart and interview and consent for this encounter given by the patient.  You have chosen to receive care through a telehealth visit.  Do you consent to use a video/audio connection for your medical care today? Yes  Patient was in their residence when this visit took place and I was in my medical office.

## 2025-01-14 ENCOUNTER — TELEMEDICINE (OUTPATIENT)
Dept: FAMILY MEDICINE CLINIC | Facility: CLINIC | Age: 85
End: 2025-01-14
Payer: MEDICARE

## 2025-01-14 ENCOUNTER — HOSPITAL ENCOUNTER (INPATIENT)
Facility: HOSPITAL | Age: 85
LOS: 5 days | Discharge: NURSING, REHAB OR LONG-TERM CARE | End: 2025-01-21
Attending: EMERGENCY MEDICINE | Admitting: INTERNAL MEDICINE
Payer: MEDICARE

## 2025-01-14 DIAGNOSIS — Z79.4 TYPE 2 DIABETES MELLITUS WITH HYPERGLYCEMIA, WITH LONG-TERM CURRENT USE OF INSULIN: ICD-10-CM

## 2025-01-14 DIAGNOSIS — I50.9 CONGESTIVE HEART FAILURE, UNSPECIFIED HF CHRONICITY, UNSPECIFIED HEART FAILURE TYPE: Primary | ICD-10-CM

## 2025-01-14 DIAGNOSIS — M48.062 SPINAL STENOSIS OF LUMBAR REGION WITH NEUROGENIC CLAUDICATION: ICD-10-CM

## 2025-01-14 DIAGNOSIS — L03.115 CELLULITIS OF RIGHT LEG: Primary | ICD-10-CM

## 2025-01-14 DIAGNOSIS — E11.65 TYPE 2 DIABETES MELLITUS WITH HYPERGLYCEMIA, WITH LONG-TERM CURRENT USE OF INSULIN: ICD-10-CM

## 2025-01-14 DIAGNOSIS — R60.0 PERIPHERAL EDEMA: ICD-10-CM

## 2025-01-14 DIAGNOSIS — R29.898 WEAKNESS OF BOTH LOWER EXTREMITIES: ICD-10-CM

## 2025-01-14 DIAGNOSIS — M51.34 THORACIC DEGENERATIVE DISC DISEASE: ICD-10-CM

## 2025-01-14 DIAGNOSIS — E13.43: ICD-10-CM

## 2025-01-14 DIAGNOSIS — R60.0 BILATERAL LOWER EXTREMITY EDEMA: ICD-10-CM

## 2025-01-14 DIAGNOSIS — E16.2 HYPOGLYCEMIA: ICD-10-CM

## 2025-01-14 DIAGNOSIS — Z78.9 DECREASED ACTIVITIES OF DAILY LIVING (ADL): ICD-10-CM

## 2025-01-14 DIAGNOSIS — L97.919 ULCER OF RIGHT LOWER EXTREMITY, UNSPECIFIED ULCER STAGE: ICD-10-CM

## 2025-01-14 DIAGNOSIS — M79.2 NEUROPATHIC PAIN: ICD-10-CM

## 2025-01-14 LAB
ALBUMIN SERPL-MCNC: 3.2 G/DL (ref 3.5–5.2)
ALBUMIN/GLOB SERPL: 0.9 G/DL
ALP SERPL-CCNC: 129 U/L (ref 39–117)
ALT SERPL W P-5'-P-CCNC: 5 U/L (ref 1–33)
ANION GAP SERPL CALCULATED.3IONS-SCNC: 12.3 MMOL/L (ref 5–15)
AST SERPL-CCNC: 13 U/L (ref 1–32)
BASOPHILS # BLD AUTO: 0.1 10*3/MM3 (ref 0–0.2)
BASOPHILS NFR BLD AUTO: 0.8 % (ref 0–1.5)
BILIRUB SERPL-MCNC: <0.2 MG/DL (ref 0–1.2)
BUN SERPL-MCNC: 41 MG/DL (ref 8–23)
BUN/CREAT SERPL: 16.5 (ref 7–25)
CALCIUM SPEC-SCNC: 7.7 MG/DL (ref 8.6–10.5)
CHLORIDE SERPL-SCNC: 112 MMOL/L (ref 98–107)
CK SERPL-CCNC: 151 U/L (ref 20–180)
CO2 SERPL-SCNC: 15.7 MMOL/L (ref 22–29)
CREAT SERPL-MCNC: 2.48 MG/DL (ref 0.57–1)
CRP SERPL-MCNC: 0.54 MG/DL (ref 0–0.5)
D DIMER PPP FEU-MCNC: 2.66 MCGFEU/ML (ref 0–0.84)
D-LACTATE SERPL-SCNC: 1.3 MMOL/L (ref 0.5–2)
DEPRECATED RDW RBC AUTO: 46.2 FL (ref 37–54)
EGFRCR SERPLBLD CKD-EPI 2021: 18.7 ML/MIN/1.73
EOSINOPHIL # BLD AUTO: 0.21 10*3/MM3 (ref 0–0.4)
EOSINOPHIL NFR BLD AUTO: 1.6 % (ref 0.3–6.2)
ERYTHROCYTE [DISTWIDTH] IN BLOOD BY AUTOMATED COUNT: 14.9 % (ref 12.3–15.4)
GLOBULIN UR ELPH-MCNC: 3.6 GM/DL
GLUCOSE BLDC GLUCOMTR-MCNC: 137 MG/DL (ref 70–130)
GLUCOSE BLDC GLUCOMTR-MCNC: 65 MG/DL (ref 70–130)
GLUCOSE BLDC GLUCOMTR-MCNC: 90 MG/DL (ref 70–130)
GLUCOSE SERPL-MCNC: 63 MG/DL (ref 65–99)
HCT VFR BLD AUTO: 28.6 % (ref 34–46.6)
HGB BLD-MCNC: 9.4 G/DL (ref 12–15.9)
IMM GRANULOCYTES # BLD AUTO: 0.09 10*3/MM3 (ref 0–0.05)
IMM GRANULOCYTES NFR BLD AUTO: 0.7 % (ref 0–0.5)
INR PPP: 1.13 (ref 0.9–1.1)
LYMPHOCYTES # BLD AUTO: 0.61 10*3/MM3 (ref 0.7–3.1)
LYMPHOCYTES NFR BLD AUTO: 4.7 % (ref 19.6–45.3)
MAGNESIUM SERPL-MCNC: 1.5 MG/DL (ref 1.6–2.4)
MCH RBC QN AUTO: 28.1 PG (ref 26.6–33)
MCHC RBC AUTO-ENTMCNC: 32.9 G/DL (ref 31.5–35.7)
MCV RBC AUTO: 85.4 FL (ref 79–97)
MONOCYTES # BLD AUTO: 0.81 10*3/MM3 (ref 0.1–0.9)
MONOCYTES NFR BLD AUTO: 6.2 % (ref 5–12)
NEUTROPHILS NFR BLD AUTO: 11.17 10*3/MM3 (ref 1.7–7)
NEUTROPHILS NFR BLD AUTO: 86 % (ref 42.7–76)
NRBC BLD AUTO-RTO: 0 /100 WBC (ref 0–0.2)
NT-PROBNP SERPL-MCNC: 1467 PG/ML (ref 0–1800)
PHOSPHATE SERPL-MCNC: 4.7 MG/DL (ref 2.5–4.5)
PLATELET # BLD AUTO: 303 10*3/MM3 (ref 140–450)
PMV BLD AUTO: 9.4 FL (ref 6–12)
POTASSIUM SERPL-SCNC: 3.5 MMOL/L (ref 3.5–5.2)
PROCALCITONIN SERPL-MCNC: 0.11 NG/ML (ref 0–0.25)
PROT SERPL-MCNC: 6.8 G/DL (ref 6–8.5)
PROTHROMBIN TIME: 14.7 SECONDS (ref 11.7–14.2)
RBC # BLD AUTO: 3.35 10*6/MM3 (ref 3.77–5.28)
SODIUM SERPL-SCNC: 140 MMOL/L (ref 136–145)
WBC NRBC COR # BLD AUTO: 12.99 10*3/MM3 (ref 3.4–10.8)

## 2025-01-14 PROCEDURE — 83735 ASSAY OF MAGNESIUM: CPT | Performed by: EMERGENCY MEDICINE

## 2025-01-14 PROCEDURE — 84145 PROCALCITONIN (PCT): CPT | Performed by: EMERGENCY MEDICINE

## 2025-01-14 PROCEDURE — 84100 ASSAY OF PHOSPHORUS: CPT | Performed by: EMERGENCY MEDICINE

## 2025-01-14 PROCEDURE — 87040 BLOOD CULTURE FOR BACTERIA: CPT | Performed by: EMERGENCY MEDICINE

## 2025-01-14 PROCEDURE — 1125F AMNT PAIN NOTED PAIN PRSNT: CPT | Performed by: FAMILY MEDICINE

## 2025-01-14 PROCEDURE — 82948 REAGENT STRIP/BLOOD GLUCOSE: CPT

## 2025-01-14 PROCEDURE — 99213 OFFICE O/P EST LOW 20 MIN: CPT | Performed by: FAMILY MEDICINE

## 2025-01-14 PROCEDURE — 85025 COMPLETE CBC W/AUTO DIFF WBC: CPT | Performed by: EMERGENCY MEDICINE

## 2025-01-14 PROCEDURE — 25010000002 ENOXAPARIN PER 10 MG: Performed by: EMERGENCY MEDICINE

## 2025-01-14 PROCEDURE — 25010000002 VANCOMYCIN 10 G RECONSTITUTED SOLUTION: Performed by: EMERGENCY MEDICINE

## 2025-01-14 PROCEDURE — 36415 COLL VENOUS BLD VENIPUNCTURE: CPT

## 2025-01-14 PROCEDURE — 85379 FIBRIN DEGRADATION QUANT: CPT | Performed by: EMERGENCY MEDICINE

## 2025-01-14 PROCEDURE — 25810000003 SODIUM CHLORIDE 0.9 % SOLUTION: Performed by: EMERGENCY MEDICINE

## 2025-01-14 PROCEDURE — 83605 ASSAY OF LACTIC ACID: CPT | Performed by: EMERGENCY MEDICINE

## 2025-01-14 PROCEDURE — 80053 COMPREHEN METABOLIC PANEL: CPT | Performed by: EMERGENCY MEDICINE

## 2025-01-14 PROCEDURE — 1160F RVW MEDS BY RX/DR IN RCRD: CPT | Performed by: FAMILY MEDICINE

## 2025-01-14 PROCEDURE — 83880 ASSAY OF NATRIURETIC PEPTIDE: CPT | Performed by: EMERGENCY MEDICINE

## 2025-01-14 PROCEDURE — 99285 EMERGENCY DEPT VISIT HI MDM: CPT

## 2025-01-14 PROCEDURE — 82550 ASSAY OF CK (CPK): CPT | Performed by: EMERGENCY MEDICINE

## 2025-01-14 PROCEDURE — 85610 PROTHROMBIN TIME: CPT | Performed by: EMERGENCY MEDICINE

## 2025-01-14 PROCEDURE — 86140 C-REACTIVE PROTEIN: CPT | Performed by: EMERGENCY MEDICINE

## 2025-01-14 PROCEDURE — 1159F MED LIST DOCD IN RCRD: CPT | Performed by: FAMILY MEDICINE

## 2025-01-14 RX ORDER — ENOXAPARIN SODIUM 100 MG/ML
1 INJECTION SUBCUTANEOUS ONCE
Status: COMPLETED | OUTPATIENT
Start: 2025-01-14 | End: 2025-01-14

## 2025-01-14 RX ORDER — SODIUM CHLORIDE 0.9 % (FLUSH) 0.9 %
10 SYRINGE (ML) INJECTION AS NEEDED
Status: DISCONTINUED | OUTPATIENT
Start: 2025-01-14 | End: 2025-01-21 | Stop reason: HOSPADM

## 2025-01-14 RX ADMIN — VANCOMYCIN HYDROCHLORIDE 1750 MG: 10 INJECTION, POWDER, LYOPHILIZED, FOR SOLUTION INTRAVENOUS at 22:48

## 2025-01-14 RX ADMIN — ENOXAPARIN SODIUM 90 MG: 100 INJECTION SUBCUTANEOUS at 22:49

## 2025-01-14 NOTE — LETTER
EMS Transport Request  For use at [facility], [place], [place], [place], and [place] only   Patient Name: Halie Guidry : 1940   Weight:101 kg (223 lb 12.3 oz) Pick-up Location: [facility], [place] BLS/ALS: BLS/ALS: BLS   Insurance: [employer] MEDICARE REPLACEMENT Auth End Date: na   Pre-Cert #:pending  D/C Summary complete:    Destination: Other [place]    Contact Precautions: Other Contact   Equipment (O2, Fluids, etc.): None   Arrive By Date/Time: 2025 late afternoon awaiting precert  Stretcher/WC: Stretcher   CM Requesting: Savita Gomez RN Ext: [phone]   Notes/Medical Necessity: unable to transfer, pain, dressings     ______________________________________________________________________    *Only 2 patient bags OR 1 carry-on size bag are permitted.  Wheelchairs and walkers CANNOT transported with the patient. Acknowledge: Yes

## 2025-01-15 ENCOUNTER — TELEPHONE (OUTPATIENT)
Dept: FAMILY MEDICINE CLINIC | Facility: CLINIC | Age: 85
End: 2025-01-15
Payer: MEDICARE

## 2025-01-15 PROBLEM — E11.65 TYPE 2 DIABETES MELLITUS WITH HYPERGLYCEMIA: Status: ACTIVE | Noted: 2021-12-02

## 2025-01-15 PROBLEM — N18.4 CKD (CHRONIC KIDNEY DISEASE) STAGE 4, GFR 15-29 ML/MIN: Status: ACTIVE | Noted: 2024-01-28

## 2025-01-15 LAB
ALBUMIN SERPL-MCNC: 3.3 G/DL (ref 3.5–5.2)
ALBUMIN/GLOB SERPL: 0.9 G/DL
ALP SERPL-CCNC: 138 U/L (ref 39–117)
ALT SERPL W P-5'-P-CCNC: 6 U/L (ref 1–33)
ANION GAP SERPL CALCULATED.3IONS-SCNC: 12.5 MMOL/L (ref 5–15)
AST SERPL-CCNC: 10 U/L (ref 1–32)
BILIRUB SERPL-MCNC: 0.2 MG/DL (ref 0–1.2)
BUN SERPL-MCNC: 44 MG/DL (ref 8–23)
BUN/CREAT SERPL: 18.1 (ref 7–25)
CALCIUM SPEC-SCNC: 8.4 MG/DL (ref 8.6–10.5)
CHLORIDE SERPL-SCNC: 109 MMOL/L (ref 98–107)
CO2 SERPL-SCNC: 18.5 MMOL/L (ref 22–29)
CREAT SERPL-MCNC: 2.43 MG/DL (ref 0.57–1)
DEPRECATED RDW RBC AUTO: 45 FL (ref 37–54)
EGFRCR SERPLBLD CKD-EPI 2021: 19.2 ML/MIN/1.73
ERYTHROCYTE [DISTWIDTH] IN BLOOD BY AUTOMATED COUNT: 14.8 % (ref 12.3–15.4)
GLOBULIN UR ELPH-MCNC: 3.7 GM/DL
GLUCOSE BLDC GLUCOMTR-MCNC: 157 MG/DL (ref 70–130)
GLUCOSE BLDC GLUCOMTR-MCNC: 167 MG/DL (ref 70–130)
GLUCOSE BLDC GLUCOMTR-MCNC: 227 MG/DL (ref 70–130)
GLUCOSE BLDC GLUCOMTR-MCNC: 238 MG/DL (ref 70–130)
GLUCOSE SERPL-MCNC: 160 MG/DL (ref 65–99)
HCT VFR BLD AUTO: 27.6 % (ref 34–46.6)
HGB BLD-MCNC: 9.2 G/DL (ref 12–15.9)
MCH RBC QN AUTO: 28.2 PG (ref 26.6–33)
MCHC RBC AUTO-ENTMCNC: 33.3 G/DL (ref 31.5–35.7)
MCV RBC AUTO: 84.7 FL (ref 79–97)
PLATELET # BLD AUTO: 327 10*3/MM3 (ref 140–450)
PMV BLD AUTO: 9.6 FL (ref 6–12)
POTASSIUM SERPL-SCNC: 3.8 MMOL/L (ref 3.5–5.2)
PROT SERPL-MCNC: 7 G/DL (ref 6–8.5)
RBC # BLD AUTO: 3.26 10*6/MM3 (ref 3.77–5.28)
SODIUM SERPL-SCNC: 140 MMOL/L (ref 136–145)
VANCOMYCIN SERPL-MCNC: 20.5 MCG/ML (ref 5–40)
WBC NRBC COR # BLD AUTO: 11.86 10*3/MM3 (ref 3.4–10.8)

## 2025-01-15 PROCEDURE — 25010000002 ENOXAPARIN PER 10 MG: Performed by: NURSE PRACTITIONER

## 2025-01-15 PROCEDURE — 63710000001 INSULIN LISPRO (HUMAN) PER 5 UNITS: Performed by: INTERNAL MEDICINE

## 2025-01-15 PROCEDURE — G0378 HOSPITAL OBSERVATION PER HR: HCPCS

## 2025-01-15 PROCEDURE — 25810000003 SODIUM CHLORIDE 0.9 % SOLUTION 250 ML FLEX CONT: Performed by: INTERNAL MEDICINE

## 2025-01-15 PROCEDURE — 25010000002 VANCOMYCIN 750 MG RECONSTITUTED SOLUTION 1 EACH VIAL: Performed by: INTERNAL MEDICINE

## 2025-01-15 PROCEDURE — 25010000002 LABETALOL 5 MG/ML SOLUTION: Performed by: NURSE PRACTITIONER

## 2025-01-15 PROCEDURE — 80202 ASSAY OF VANCOMYCIN: CPT | Performed by: INTERNAL MEDICINE

## 2025-01-15 PROCEDURE — 87481 CANDIDA DNA AMP PROBE: CPT | Performed by: STUDENT IN AN ORGANIZED HEALTH CARE EDUCATION/TRAINING PROGRAM

## 2025-01-15 PROCEDURE — 82948 REAGENT STRIP/BLOOD GLUCOSE: CPT

## 2025-01-15 PROCEDURE — 85027 COMPLETE CBC AUTOMATED: CPT | Performed by: NURSE PRACTITIONER

## 2025-01-15 PROCEDURE — 25010000002 CEFTAZIDIME 2 G RECONSTITUTED SOLUTION 1 EACH VIAL: Performed by: INTERNAL MEDICINE

## 2025-01-15 PROCEDURE — 80053 COMPREHEN METABOLIC PANEL: CPT | Performed by: NURSE PRACTITIONER

## 2025-01-15 PROCEDURE — 36415 COLL VENOUS BLD VENIPUNCTURE: CPT | Performed by: NURSE PRACTITIONER

## 2025-01-15 RX ORDER — PREGABALIN 75 MG/1
75 CAPSULE ORAL 2 TIMES DAILY
Status: DISCONTINUED | OUTPATIENT
Start: 2025-01-15 | End: 2025-01-21 | Stop reason: HOSPADM

## 2025-01-15 RX ORDER — NICOTINE POLACRILEX 4 MG
15 LOZENGE BUCCAL
Status: DISCONTINUED | OUTPATIENT
Start: 2025-01-15 | End: 2025-01-21 | Stop reason: HOSPADM

## 2025-01-15 RX ORDER — ALUMINA, MAGNESIA, AND SIMETHICONE 2400; 2400; 240 MG/30ML; MG/30ML; MG/30ML
15 SUSPENSION ORAL EVERY 6 HOURS PRN
Status: DISCONTINUED | OUTPATIENT
Start: 2025-01-15 | End: 2025-01-21 | Stop reason: HOSPADM

## 2025-01-15 RX ORDER — POLYETHYLENE GLYCOL 3350 17 G/17G
17 POWDER, FOR SOLUTION ORAL DAILY PRN
Status: DISCONTINUED | OUTPATIENT
Start: 2025-01-15 | End: 2025-01-21 | Stop reason: HOSPADM

## 2025-01-15 RX ORDER — INSULIN LISPRO 100 [IU]/ML
2-7 INJECTION, SOLUTION INTRAVENOUS; SUBCUTANEOUS
Status: DISCONTINUED | OUTPATIENT
Start: 2025-01-15 | End: 2025-01-21 | Stop reason: HOSPADM

## 2025-01-15 RX ORDER — ACETAMINOPHEN 160 MG/5ML
650 SOLUTION ORAL EVERY 4 HOURS PRN
Status: DISCONTINUED | OUTPATIENT
Start: 2025-01-15 | End: 2025-01-21 | Stop reason: HOSPADM

## 2025-01-15 RX ORDER — TORSEMIDE 20 MG/1
40 TABLET ORAL 2 TIMES DAILY
Status: DISCONTINUED | OUTPATIENT
Start: 2025-01-15 | End: 2025-01-21 | Stop reason: HOSPADM

## 2025-01-15 RX ORDER — ONDANSETRON 4 MG/1
4 TABLET, ORALLY DISINTEGRATING ORAL EVERY 6 HOURS PRN
Status: DISCONTINUED | OUTPATIENT
Start: 2025-01-15 | End: 2025-01-21 | Stop reason: HOSPADM

## 2025-01-15 RX ORDER — NITROGLYCERIN 0.4 MG/1
0.4 TABLET SUBLINGUAL
Status: DISCONTINUED | OUTPATIENT
Start: 2025-01-15 | End: 2025-01-21 | Stop reason: HOSPADM

## 2025-01-15 RX ORDER — AMMONIUM LACTATE 12 G/100G
1 CREAM TOPICAL 2 TIMES DAILY
Status: DISCONTINUED | OUTPATIENT
Start: 2025-01-15 | End: 2025-01-21 | Stop reason: HOSPADM

## 2025-01-15 RX ORDER — DEXTROSE MONOHYDRATE 25 G/50ML
25 INJECTION, SOLUTION INTRAVENOUS
Status: DISCONTINUED | OUTPATIENT
Start: 2025-01-15 | End: 2025-01-21 | Stop reason: HOSPADM

## 2025-01-15 RX ORDER — ACETAMINOPHEN 650 MG/1
650 SUPPOSITORY RECTAL EVERY 4 HOURS PRN
Status: DISCONTINUED | OUTPATIENT
Start: 2025-01-15 | End: 2025-01-21 | Stop reason: HOSPADM

## 2025-01-15 RX ORDER — ONDANSETRON 2 MG/ML
4 INJECTION INTRAMUSCULAR; INTRAVENOUS EVERY 6 HOURS PRN
Status: DISCONTINUED | OUTPATIENT
Start: 2025-01-15 | End: 2025-01-21 | Stop reason: HOSPADM

## 2025-01-15 RX ORDER — BISACODYL 10 MG
10 SUPPOSITORY, RECTAL RECTAL DAILY PRN
Status: DISCONTINUED | OUTPATIENT
Start: 2025-01-15 | End: 2025-01-21 | Stop reason: HOSPADM

## 2025-01-15 RX ORDER — LABETALOL HYDROCHLORIDE 5 MG/ML
10 INJECTION, SOLUTION INTRAVENOUS EVERY 6 HOURS PRN
Status: DISCONTINUED | OUTPATIENT
Start: 2025-01-15 | End: 2025-01-21 | Stop reason: HOSPADM

## 2025-01-15 RX ORDER — BISOPROLOL FUMARATE 5 MG/1
5 TABLET, FILM COATED ORAL DAILY
Status: DISCONTINUED | OUTPATIENT
Start: 2025-01-15 | End: 2025-01-21 | Stop reason: HOSPADM

## 2025-01-15 RX ORDER — TERAZOSIN 1 MG/1
1 CAPSULE ORAL NIGHTLY
Status: DISCONTINUED | OUTPATIENT
Start: 2025-01-15 | End: 2025-01-21 | Stop reason: HOSPADM

## 2025-01-15 RX ORDER — BISACODYL 5 MG/1
5 TABLET, DELAYED RELEASE ORAL DAILY PRN
Status: DISCONTINUED | OUTPATIENT
Start: 2025-01-15 | End: 2025-01-21 | Stop reason: HOSPADM

## 2025-01-15 RX ORDER — ACETAMINOPHEN 325 MG/1
650 TABLET ORAL EVERY 4 HOURS PRN
Status: DISCONTINUED | OUTPATIENT
Start: 2025-01-15 | End: 2025-01-21 | Stop reason: HOSPADM

## 2025-01-15 RX ORDER — IBUPROFEN 600 MG/1
1 TABLET ORAL
Status: DISCONTINUED | OUTPATIENT
Start: 2025-01-15 | End: 2025-01-21 | Stop reason: HOSPADM

## 2025-01-15 RX ORDER — PANTOPRAZOLE SODIUM 40 MG/1
40 TABLET, DELAYED RELEASE ORAL
Status: DISCONTINUED | OUTPATIENT
Start: 2025-01-16 | End: 2025-01-21 | Stop reason: HOSPADM

## 2025-01-15 RX ORDER — SODIUM CHLORIDE 0.9 % (FLUSH) 0.9 %
10 SYRINGE (ML) INJECTION AS NEEDED
Status: DISCONTINUED | OUTPATIENT
Start: 2025-01-15 | End: 2025-01-21 | Stop reason: HOSPADM

## 2025-01-15 RX ORDER — AMOXICILLIN 250 MG
2 CAPSULE ORAL 2 TIMES DAILY PRN
Status: DISCONTINUED | OUTPATIENT
Start: 2025-01-15 | End: 2025-01-21 | Stop reason: HOSPADM

## 2025-01-15 RX ORDER — ENOXAPARIN SODIUM 100 MG/ML
30 INJECTION SUBCUTANEOUS NIGHTLY
Status: DISCONTINUED | OUTPATIENT
Start: 2025-01-15 | End: 2025-01-21 | Stop reason: HOSPADM

## 2025-01-15 RX ORDER — FLUTICASONE PROPIONATE 50 MCG
2 SPRAY, SUSPENSION (ML) NASAL DAILY
Status: DISCONTINUED | OUTPATIENT
Start: 2025-01-15 | End: 2025-01-21 | Stop reason: HOSPADM

## 2025-01-15 RX ORDER — ATORVASTATIN CALCIUM 80 MG/1
80 TABLET, FILM COATED ORAL NIGHTLY
Status: DISCONTINUED | OUTPATIENT
Start: 2025-01-15 | End: 2025-01-21 | Stop reason: HOSPADM

## 2025-01-15 RX ADMIN — ZINC OXIDE 1 APPLICATION: 200 OINTMENT TOPICAL at 16:03

## 2025-01-15 RX ADMIN — TERAZOSIN HYDROCHLORIDE 1 MG: 1 CAPSULE ORAL at 20:28

## 2025-01-15 RX ADMIN — Medication 1 APPLICATION: at 20:28

## 2025-01-15 RX ADMIN — ZINC OXIDE 1 APPLICATION: 200 OINTMENT TOPICAL at 20:28

## 2025-01-15 RX ADMIN — ACETAMINOPHEN 650 MG: 325 TABLET, FILM COATED ORAL at 12:10

## 2025-01-15 RX ADMIN — CEFTAZIDIME 2000 MG: 2 INJECTION, POWDER, FOR SOLUTION INTRAVENOUS at 12:27

## 2025-01-15 RX ADMIN — FLUTICASONE PROPIONATE 2 SPRAY: 50 SPRAY, METERED NASAL at 14:01

## 2025-01-15 RX ADMIN — INSULIN LISPRO 2 UNITS: 100 INJECTION, SOLUTION INTRAVENOUS; SUBCUTANEOUS at 12:53

## 2025-01-15 RX ADMIN — ACETAMINOPHEN 650 MG: 325 TABLET, FILM COATED ORAL at 03:01

## 2025-01-15 RX ADMIN — LABETALOL HYDROCHLORIDE 10 MG: 5 INJECTION, SOLUTION INTRAVENOUS at 21:48

## 2025-01-15 RX ADMIN — BISOPROLOL FUMARATE 5 MG: 5 TABLET ORAL at 14:01

## 2025-01-15 RX ADMIN — TORSEMIDE 40 MG: 20 TABLET ORAL at 20:28

## 2025-01-15 RX ADMIN — INSULIN LISPRO 3 UNITS: 100 INJECTION, SOLUTION INTRAVENOUS; SUBCUTANEOUS at 21:49

## 2025-01-15 RX ADMIN — Medication 1 APPLICATION: at 14:01

## 2025-01-15 RX ADMIN — ENOXAPARIN SODIUM 30 MG: 100 INJECTION SUBCUTANEOUS at 20:28

## 2025-01-15 RX ADMIN — PREGABALIN 75 MG: 75 CAPSULE ORAL at 16:03

## 2025-01-15 RX ADMIN — Medication 10 ML: at 21:49

## 2025-01-15 RX ADMIN — PREGABALIN 75 MG: 75 CAPSULE ORAL at 20:28

## 2025-01-15 RX ADMIN — VANCOMYCIN HYDROCHLORIDE 750 MG: 750 INJECTION, POWDER, LYOPHILIZED, FOR SOLUTION INTRAVENOUS at 16:39

## 2025-01-15 RX ADMIN — ATORVASTATIN CALCIUM 80 MG: 80 TABLET, FILM COATED ORAL at 20:28

## 2025-01-15 RX ADMIN — ACETAMINOPHEN 650 MG: 325 TABLET, FILM COATED ORAL at 21:48

## 2025-01-15 RX ADMIN — TORSEMIDE 40 MG: 20 TABLET ORAL at 12:53

## 2025-01-15 RX ADMIN — LABETALOL HYDROCHLORIDE 10 MG: 5 INJECTION, SOLUTION INTRAVENOUS at 04:32

## 2025-01-15 RX ADMIN — INSULIN LISPRO 3 UNITS: 100 INJECTION, SOLUTION INTRAVENOUS; SUBCUTANEOUS at 17:48

## 2025-01-15 NOTE — NURSING NOTE
Reason for Visit: CWCN: We see patient to evaluate skin issue in bilateral lower legi in the Coccyx. Patient alert, with history of Type 2 DM with autonomic neuropathy and CKD stage 4 as well as BLE lymphedema and a chronic right lower extremity wound that presented complaints of worsening erythema and drainage around the RLE wound.  Upon assessment we could see bright redness, swollen appearance, tend to touch towards bilateral lower leg/feet, with open wound x 2 in left lower leg, possibly related to Cellulite.  In addition, rash, red in color and appearance of fungal infection were observed in the adbominal fold, bilateral breats fold and Groins.  Bilateral heels with intact skin and red discoloration bleachable.  Treatment Plan/Recommendations:    Magic barrier ointment to Sacrococcygeal, bilateral Breast and abdominal fold, Groins.  Protective padding should be used to facilitate cushioning, they must remain off-load  Wound care order and pressure ulcer preventives  measures have been implemented into [facility].   Specialty mattress, Centrella Pro + for adequate pressure redistribution and pressure relief, will be requested, unit secretary will call.  Wound Team Follow up Plan: WCN will follow up her according to her needs.

## 2025-01-15 NOTE — PROGRESS NOTES
"[facility] Clinical Pharmacy Services: Vancomycin Pharmacokinetic Initial Consult Note    Halie Guidry is a 84 y.o. female who is on day 1/7 of pharmacy to dose vancomycin for SSTI    Consulting Provider: Dr. Sheppard   Culture/Source:   -1/14 Blood cx: in process   Target: -600 mg/L.hr     Vitals/Labs  Ht: 165.1 cm (65\"); Wt: 101 kg (223 lb 12.3 oz)  Temp Readings from Last 1 Encounters:   01/15/25 98.4 °F (36.9 °C) (Oral)    Estimated Creatinine Clearance: 20.4 mL/min (A) (by C-G formula based on SCr of 2.43 mg/dL (H)).       Results from last 7 days   Lab Units 01/15/25  0556 01/14/25 2002   CREATININE mg/dL 2.43* 2.48*   WBC 10*3/mm3 11.86* 12.99*     Assessment/Plan:  Advanced CKD, Scr elevated from baseline (~2-2.2). Patient was loaded with vancomycin 1750 mg x 1 prior to consult. Random collected with AM labs returned at 20.5 mg/L and is reflective of a ~7 hr level. Continue with intermittent pulse dosing.    Vancomycin Dose: plan for a supplemental dose of vancomycin 750 mg x 1 this afternoon to allow for an additional few hours of clearance.   Vanc Random has been ordered for tomorrow AM with labs     Pharmacy will follow patient's kidney function and will adjust doses and obtain levels as necessary. Thank you for involving pharmacy in this patient's care. Please contact pharmacy with any questions or concerns.                           Cheyenne Gowers, [facility]  Clinical Pharmacist       "

## 2025-01-15 NOTE — SIGNIFICANT NOTE
01/15/25 1112   OTHER   Discipline physical therapist   Rehab Time/Intention   Session Not Performed patient/family declined, not feeling well  (Pt politely declined PT eval this AM d/t not feeling well. Encouraged OOB mobility or at least sitting EOB and edu on benefit w/ pt cont to decline. RN made aware. PT may f/u later this PM, time permitting, or next service date for eval as appropriate.)   Therapy Assessment/Plan (PT)   Criteria for Skilled Interventions Met (PT) yes   Recommendation   PT - Next Appointment 01/16/25

## 2025-01-15 NOTE — DISCHARGE PLACEMENT REQUEST
"Tabatha Guidry (84 y.o. Female)       Date of Birth   1940    Social Security Number       Address   [address]  [place] [id number]    Home Phone   [phone]    MRN   [id number]       Episcopalian   Religion    Marital Status                               Admission Date   1/14/25    Admission Type   Emergency    Admitting Provider   Lewis Sheppard MD    Attending Provider   Lewis Sheppard MD    Department, Room/Bed   [address], [address]/1       Discharge Date       Discharge Disposition       Discharge Destination                                 Attending Provider: Lewis Sheppard MD    Allergies: Sulfa Antibiotics    Isolation: Contact   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), Candida Auris (rule out) (01/15/25)   Code Status: CPR    Ht: 165.1 cm (65\")   Wt: 101 kg (223 lb 12.3 oz)    Admission Cmt: None   Principal Problem: Cellulitis of right lower extremity [L03.115]                   Active Insurance as of 1/14/2025       Primary Coverage       Payor Plan Insurance Group Employer/Plan Group    [facility] MEDICARE REPLACEMENT [facility] MED ADV GROUP [id number]       Payor Plan Address Payor Plan Phone Number Payor Plan Fax Number Effective Dates    [address] [address]   1/1/2023 - None Entered    [facility] [id number]         Subscriber Name Subscriber Birth Date Member ID       TABATHA GUIDRY 1940 [id number]                     Emergency Contacts        (Rel.) Home Phone Work Phone Mobile Phone    Derrick Guidry ([employer]) (Son) [phone] -- [phone]    Josse Guidry (HCS) (Son) -- -- [phone]    JAYME GUIDRY (Grandchild) -- -- [phone]                "

## 2025-01-15 NOTE — PLAN OF CARE
Problem: Adult Inpatient Plan of Care  Goal: Plan of Care Review  Flowsheets (Taken 1/15/2025 0402)  Outcome Evaluation: pt is a new admit. pt is AAOX3. pt rports she lives in an assisted living. pt has a stahe 2 to the coccyx, RLE ulcers and BLE cellulitis, right breast area has redness/ moisture assicated dermatitis, wound and nutrition consulted.  Plan of Care Reviewed With: patient     Problem: Adult Inpatient Plan of Care  Goal: Patient-Specific Goal (Individualized)  Reactivated     Problem: Adult Inpatient Plan of Care  Goal: Patient-Specific Goal (Individualized)  Reactivated     Problem: Adult Inpatient Plan of Care  Goal: Absence of Hospital-Acquired Illness or Injury  Reactivated  Intervention: Identify and Manage Fall Risk  Recent Flowsheet Documentation  Taken 1/15/2025 0220 by Merlyn Ferraro RN  Safety Promotion/Fall Prevention:   clutter free environment maintained   fall prevention program maintained   lighting adjusted   room organization consistent   safety round/check completed  Intervention: Prevent Skin Injury  Recent Flowsheet Documentation  Taken 1/15/2025 0220 by Merlyn Ferraro RN  Body Position: sitting up in bed  Intervention: Prevent Infection  Recent Flowsheet Documentation  Taken 1/15/2025 0220 by Merlyn Ferraro RN  Infection Prevention:   environmental surveillance performed   hand hygiene promoted   rest/sleep promoted   single patient room provided   equipment surfaces disinfected     Problem: Adult Inpatient Plan of Care  Goal: Absence of Hospital-Acquired Illness or Injury  Intervention: Identify and Manage Fall Risk  Recent Flowsheet Documentation  Taken 1/15/2025 0220 by Merlyn Ferraro RN  Safety Promotion/Fall Prevention:   clutter free environment maintained   fall prevention program maintained   lighting adjusted   room organization consistent   safety round/check completed     Problem: Adult Inpatient Plan of Care  Goal: Absence of Hospital-Acquired Illness or  Injury  Intervention: Prevent Skin Injury  Recent Flowsheet Documentation  Taken 1/15/2025 0220 by Merlyn Ferraro RN  Body Position: sitting up in bed     Problem: Adult Inpatient Plan of Care  Goal: Absence of Hospital-Acquired Illness or Injury  Intervention: Prevent Infection  Recent Flowsheet Documentation  Taken 1/15/2025 0220 by Merlyn Ferraro RN  Infection Prevention:   environmental surveillance performed   hand hygiene promoted   rest/sleep promoted   single patient room provided   equipment surfaces disinfected     Problem: Skin Injury Risk Increased  Goal: Skin Health and Integrity  Reactivated  Intervention: Optimize Skin Protection  Recent Flowsheet Documentation  Taken 1/15/2025 0220 by Merlyn Ferraro RN  Activity Management: activity minimized  Head of Bed (HOB) Positioning: HOB at 45 degrees     Problem: Skin Injury Risk Increased  Goal: Skin Health and Integrity  Intervention: Optimize Skin Protection  Recent Flowsheet Documentation  Taken 1/15/2025 0220 by Merlyn Ferraro RN  Activity Management: activity minimized  Head of Bed (HOB) Positioning: HOB at 45 degrees   Goal Outcome Evaluation:  Plan of Care Reviewed With: patient           Outcome Evaluation: pt is a new admit. pt is AAOX3. pt rports she lives in an assisted living. pt has a stahe 2 to the coccyx, RLE ulcers and BLE cellulitis, right breast area has redness/ moisture assicated dermatitis, wound and nutrition consulted.

## 2025-01-15 NOTE — PROGRESS NOTES
"[facility] Clinical Pharmacy Services: Enoxaparin Consult    Halie Guidry has a pharmacy consult to dose prophylactic enoxaparin per SERGE Weaver's request.     Indication: VTE Prophylaxis  Home Anticoagulation: N/A     Relevant clinical data and objective history reviewed:  84 y.o. female 165.1 cm (65\") 95 kg (209 lb 7 oz)   Body mass index is 34.85 kg/m².   Results from last 7 days   Lab Units 01/14/25 2002   PLATELETS 10*3/mm3 303     Estimated Creatinine Clearance: 19.2 mL/min (A) (by C-G formula based on SCr of 2.48 mg/dL (H)).    Assessment/Plan    Will start patient on 30mg subcutaneous every 24 hours, adjusted for renal function. Consult order will be discontinued but pharmacy will continue to follow.     Calvin Tate III, [facility]  Clinical Pharmacist    "

## 2025-01-15 NOTE — ED NOTES
".Nursing report ED to floor  Halie Guidry  84 y.o.  female    HPI :  HPI  Stated Reason for Visit: wound check  History Obtained From: EMS    Chief Complaint  Chief Complaint   Patient presents with    Leg Pain    Wound Check    Hypoglycemia       Admitting doctor:   Serina Leavitt MD    Admitting diagnosis:   The primary encounter diagnosis was Cellulitis of right leg. Diagnoses of Ulcer of right lower extremity, unspecified ulcer stage, Hypoglycemia, and Bilateral lower extremity edema were also pertinent to this visit.    Code status:   Current Code Status       Date Active Code Status Order ID Comments User Context       Prior            Allergies:   Sulfa antibiotics    Isolation:   Contact    Intake and Output  No intake or output data in the 24 hours ending 01/15/25 0017    Weight:       01/14/25 2130   Weight: 95 kg (209 lb 7 oz)       Most recent vitals:   Vitals:    01/14/25 1934 01/14/25 2130 01/14/25 2240 01/14/25 2349   BP: 175/92  (!) 195/74    Pulse: 67  71 74   Resp:       Temp:       SpO2: 98%  96% 96%   Weight:  95 kg (209 lb 7 oz)     Height:  165.1 cm (65\")         Active LDAs/IV Access:   Lines, Drains & Airways       Active LDAs       Name Placement date Placement time Site Days    Peripheral IV 01/14/25 1913 Posterior;Right Hand 01/14/25 1913  Hand  less than 1                    Labs (abnormal labs have a star):   Labs Reviewed   COMPREHENSIVE METABOLIC PANEL - Abnormal; Notable for the following components:       Result Value    Glucose 63 (*)     BUN 41 (*)     Creatinine 2.48 (*)     Chloride 112 (*)     CO2 15.7 (*)     Calcium 7.7 (*)     Albumin 3.2 (*)     Alkaline Phosphatase 129 (*)     eGFR 18.7 (*)     All other components within normal limits    Narrative:     GFR Categories in Chronic Kidney Disease (CKD)      GFR Category          GFR (mL/min/1.73)    Interpretation  G1                     90 or greater         Normal or high (1)  G2                      60-89       " "         Mild decrease (1)  G3a                   45-59                Mild to moderate decrease  G3b                   30-44                Moderate to severe decrease  G4                    15-29                Severe decrease  G5                    14 or less           Kidney failure          (1)In the absence of evidence of kidney disease, neither GFR category G1 or G2 fulfill the criteria for CKD.    eGFR calculation 2021 CKD-EPI creatinine equation, which does not include race as a factor   PROTIME-INR - Abnormal; Notable for the following components:    Protime 14.7 (*)     INR 1.13 (*)     All other components within normal limits   D-DIMER, QUANTITATIVE - Abnormal; Notable for the following components:    D-Dimer, Quantitative 2.66 (*)     All other components within normal limits    Narrative:     According to the assay 's published package insert, a normal (<0.50 MCGFEU/mL) D-dimer result in conjunction with a non-high clinical probability assessment, excludes deep vein thrombosis (DVT) and pulmonary embolism (PE) with high sensitivity.    D-dimer values increase with age and this can make VTE exclusion of an older population difficult. To address this, the [employer], based on best available evidence and recent guidelines, recommends that clinicians use age-adjusted D-dimer thresholds in patients greater than 50 years of age with: a) a low probability of PE who do not meet all Pulmonary Embolism Rule Out Criteria, or b) in those with intermediate probability of PE.   The formula for an age-adjusted D-dimer cut-off is \"age/100\".  For example, a 60 year old patient would have an age-adjusted cut-off of 0.60 MCGFEU/mL and an 80 year old 0.80 MCGFEU/mL.   C-REACTIVE PROTEIN - Abnormal; Notable for the following components:    C-Reactive Protein 0.54 (*)     All other components within normal limits   MAGNESIUM - Abnormal; Notable for the following components:    Magnesium 1.5 " (*)     All other components within normal limits   PHOSPHORUS - Abnormal; Notable for the following components:    Phosphorus 4.7 (*)     All other components within normal limits   CBC WITH AUTO DIFFERENTIAL - Abnormal; Notable for the following components:    WBC 12.99 (*)     RBC 3.35 (*)     Hemoglobin 9.4 (*)     Hematocrit 28.6 (*)     Neutrophil % 86.0 (*)     Lymphocyte % 4.7 (*)     Immature Grans % 0.7 (*)     Neutrophils, Absolute 11.17 (*)     Lymphocytes, Absolute 0.61 (*)     Immature Grans, Absolute 0.09 (*)     All other components within normal limits   POCT GLUCOSE FINGERSTICK - Abnormal; Notable for the following components:    Glucose 65 (*)     All other components within normal limits   POCT GLUCOSE FINGERSTICK - Abnormal; Notable for the following components:    Glucose 137 (*)     All other components within normal limits   BNP (IN-HOUSE) - Normal    Narrative:     This assay is used as an aid in the diagnosis of individuals suspected of having heart failure. It can be used as an aid in the diagnosis of acute decompensated heart failure (ADHF) in patients presenting with signs and symptoms of ADHF to the emergency department (ED). In addition, NT-proBNP of <300 pg/mL indicates ADHF is not likely.    Age Range Result Interpretation  NT-proBNP Concentration (pg/mL:      <50             Positive            >450                   Gray                 300-450                    Negative             <300    50-75           Positive            >900                  Gray                300-900                  Negative            <300      >75             Positive            >1800                  Gray                300-1800                  Negative            <300   LACTIC ACID, PLASMA - Normal   PROCALCITONIN - Normal    Narrative:     As a Marker for Sepsis (Non-Neonates):    1. <0.5 ng/mL represents a low risk of severe sepsis and/or septic shock.  2. >2 ng/mL represents a high risk of severe  "sepsis and/or septic shock.    As a Marker for Lower Respiratory Tract Infections that require antibiotic therapy:    PCT on Admission    Antibiotic Therapy       6-12 Hrs later    >0.5                Strongly Recommended  >0.25 - <0.5        Recommended   0.1 - 0.25          Discouraged              Remeasure/reassess PCT  <0.1                Strongly Discouraged     Remeasure/reassess PCT    As 28 day mortality risk marker: \"Change in Procalcitonin Result\" (>80% or <=80%) if Day 0 (or Day 1) and Day 4 values are available. Refer to http://www.[facility]-pct-calculator.com    Change in PCT <=80%  A decrease of PCT levels below or equal to 80% defines a positive change in PCT test result representing a higher risk for 28-day all-cause mortality of patients diagnosed with severe sepsis for septic shock.    Change in PCT >80%  A decrease of PCT levels of more than 80% defines a negative change in PCT result representing a lower risk for 28-day all-cause mortality of patients diagnosed with severe sepsis or septic shock.      CK - Normal   POCT GLUCOSE FINGERSTICK - Normal   BLOOD CULTURE   BLOOD CULTURE   POCT GLUCOSE FINGERSTICK   POCT GLUCOSE FINGERSTICK   POCT GLUCOSE FINGERSTICK   POCT GLUCOSE FINGERSTICK   CBC AND DIFFERENTIAL    Narrative:     The following orders were created for panel order CBC & Differential.  Procedure                               Abnormality         Status                     ---------                               -----------         ------                     CBC Auto Differential[[id number]]        Abnormal            Final result                 Please view results for these tests on the individual orders.       EKG:   No orders to display       Meds given in ED:   Medications   sodium chloride 0.9 % flush 10 mL (has no administration in time range)   vancomycin 1750 mg/500 mL 0.9% NS IVPB ([facility]) (1,750 mg Intravenous New Bag 1/14/25 8179)   Enoxaparin Sodium (LOVENOX) syringe 90 mg (90 mg " Subcutaneous Given 25 9357)       Imaging results:  No radiology results for the last day      Social issues:   Social History     Socioeconomic History    Marital status:     Number of children: 3   Tobacco Use    Smoking status: Former     Current packs/day: 0.00     Average packs/day: 1 pack/day for 40.0 years (40.0 ttl pk-yrs)     Types: Cigarettes     Start date:      Quit date:      Years since quittin.0    Smokeless tobacco: Never   Vaping Use    Vaping status: Never Used   Substance and Sexual Activity    Alcohol use: No    Drug use: No    Sexual activity: Defer       Peripheral Neurovascular  Peripheral Neurovascular (Adult)  Peripheral Neurovascular WDL: .WDL except  Calf Tenderness: right  Additional Documentation: Edema (Group), Calf Tenderness (Row)  Edema  Edema: leg, left, leg, right  Leg, Left Edema: 2+ (Mild)  Leg, Right Edema: 2+ (Mild)    Neuro Cognitive  Neuro Cognitive (Adult)  Cognitive/Neuro/Behavioral WDL: WDL    Learning  Learning Assessment  Learning Readiness and Ability: no barriers identified    Respiratory  Respiratory WDL  Respiratory WDL: WDL    Abdominal Pain       Pain Assessments  Pain (Adult)  (0-10) Pain Rating: Rest: 0    NIH Stroke Scale       Migdalia Crouch RN  01/15/25 00:17 EST

## 2025-01-15 NOTE — H&P
Patient Name:  Halie Guidry  YOB: 1940  MRN:  [id number]  Admit Date:  1/14/2025  Patient Care Team:  Napoleon Mead MD as PCP - General (Family Medicine)  Lilia Orona APRN as Nurse Practitioner (Nurse Practitioner)  Beena Laguerre APRN as Referring Physician (Gastroenterology)  Napoleon Gutierrez MD as Consulting Physician (Hematology and Oncology)  Stefan Gutierrez MD as Consulting Physician (Pain Medicine)  Merlyn Mon APRN as Nurse Practitioner (Nurse Practitioner)  Christina Wells APRN as Nurse Practitioner (Nephrology)      Subjective   History Present Illness     Chief Complaint   Patient presents with   • Leg Pain   • Wound Check   • Hypoglycemia       Ms. Guidry is a 84 y.o. former smoker with a history of Type 2 DM with autonomic neuropathy and CKD stage 4 as well as BLE lymphedema and a chronic right lower extremity wound that presents to [facility] complaining of worsening erythema and drainage around the RLE wound despite having taken doxycycline as an outpatient for the past 5 days.     Review of Systems   All other systems reviewed and are negative.       Personal History     Past Medical History:   Diagnosis Date   • Acute metabolic encephalopathy 06/24/2022   • Anxiety    • Arthritis    • Benign essential hypertension 08/20/2014   • Bleeding disorder    • Depression    • Diabetes    • Diabetes mellitus, type 2    • Disc degeneration, lumbar    • Headache, tension-type    • Hyperlipidemia    • Hypothyroidism    • Neuropathy    • Osteoporosis 09/09/2015   • Pancreatic mass     Sept 2023, on Monthly Octreotide Injection   • Peripheral neuropathy    • Rotator cuff tear, left    • Scoliosis    • Shoulder pain     LEFT, TORN ROTATOR CUFF S/P FALL   • Spinal stenosis      Past Surgical History:   Procedure Laterality Date   • APPENDECTOMY     • BILATERAL BREAST REDUCTION Bilateral 08/2015   • CATARACT EXTRACTION  03/2015   • CHOLECYSTECTOMY WITH  INTRAOPERATIVE CHOLANGIOGRAM N/A 3/27/2022    Procedure: CHOLECYSTECTOMY LAPAROSCOPIC INTRAOPERATIVE CHOLANGIOGRAM;  Surgeon: Aiyana Hill MD;  Location: [facility] MAIN OR;  Service: General;  Laterality: N/A;   • COLONOSCOPY  2015    WNL   • ERCP N/A 2022    Procedure: ENDOSCOPIC RETROGRADE CHOLANGIOPANCREATOGRAPHY with sphincterotomy and balloon sweep;  Surgeon: Chilo Wilhelm MD;  Location: [facility] ENDOSCOPY;  Service: Gastroenterology;  Laterality: N/A;  PRE/POST - CBD stones   • ERCP N/A 3/28/2022    Procedure: ENDOSCOPIC RETROGRADE CHOLANGIOPANCREATOGRAPHY WITH SPHINCTEROTOMY AND BALLOON SWEEP;  Surgeon: Chilo Wilhelm MD;  Location: [facility] ENDOSCOPY;  Service: Gastroenterology;  Laterality: N/A;  PRE: COMMON DUCT STONE  POST: COMMON DUCT STONE   • KYPHOPLASTY     • REDUCTION MAMMAPLASTY     • TONSILLECTOMY       Family History   Problem Relation Age of Onset   • Bone cancer Mother    • No Known Problems Father    • Heart disease Daughter      Social History     Tobacco Use   • Smoking status: Former     Current packs/day: 0.00     Average packs/day: 1 pack/day for 40.0 years (40.0 ttl pk-yrs)     Types: Cigarettes     Start date:      Quit date:      Years since quittin.0   • Smokeless tobacco: Never   Vaping Use   • Vaping status: Never Used   Substance Use Topics   • Alcohol use: No   • Drug use: No     No current facility-administered medications on file prior to encounter.     Current Outpatient Medications on File Prior to Encounter   Medication Sig Dispense Refill   • ammonium lactate (AMLACTIN) 12 % cream Apply 1 Application topically to the appropriate area as directed 2 (Two) Times a Day.     • atorvastatin (LIPITOR) 80 MG tablet Take 1 tablet by mouth Every Night. Indications: High Amount of Fats in the Blood 30 tablet 2   • DULoxetine (CYMBALTA) 30 MG capsule Take 1 capsule by mouth Daily. 90 capsule 3   • ferrous sulfate 325 (65 FE) MG tablet Take 1 tablet by  mouth Every Other Day.     • acetaminophen (TYLENOL) 325 MG tablet Take 2 tablets by mouth Every 6 (Six) Hours As Needed for Mild Pain or Moderate Pain.     • bisoprolol (ZEBeta) 5 MG tablet Take 1 tablet by mouth Daily. 90 tablet 3   • fluticasone (FLONASE) 50 MCG/ACT nasal spray Administer 2 sprays into the nostril(s) as directed by provider Daily. 16 g 11   • hydrALAZINE (APRESOLINE) 50 MG tablet Take 1 tablet by mouth Every 8 (Eight) Hours. 270 tablet 1   • hydrocortisone-bacitracin-zinc oxide-nystatin (MAGIC BARRIER) Apply 1 Application topically to the appropriate area as directed 2 (Two) Times a Day.     • Insulin Glargine, 2 Unit Dial, (TOUJEO) 300 UNIT/ML solution pen-injector injection Inject 90 Units under the skin into the appropriate area as directed Daily. 30 mL 0   • Insulin Lispro, 1 Unit Dial, (HumaLOG KwikPen) 100 UNIT/ML solution pen-injector Inject 15-20 Units under the skin into the appropriate area as directed 3 (Three) Times a Day Before Meals. 60 mL 0   • Insulin Pen Needle (BD Pen Needle Naila 2nd Gen) 32G X 4 MM misc Use 1 pen needle 4 (Four) Times a Day. 400 each 2   • lansoprazole (PREVACID) 30 MG capsule TAKE 1 CAPSULE DAILY 90 capsule 3   • levothyroxine (SYNTHROID, LEVOTHROID) 137 MCG tablet Take 1 tablet by mouth Every Morning. 90 tablet 1   • octreotide (sandoSTATIN) 30 MG injection Inject 30 mg into the appropriate muscle as directed by prescriber Every 28 (Twenty-Eight) Days. Next dose 9/16/2024     • pregabalin (LYRICA) 75 MG capsule Take 1 capsule by mouth 2 (Two) Times a Day for 3 days. 6 capsule 0   • terazosin (HYTRIN) 1 MG capsule Take 1 capsule by mouth Every Night. 90 capsule 1   • torsemide (DEMADEX) 20 MG tablet Take 2 tablets by mouth 2 (Two) Times a Day for 30 days. 120 tablet 0     Allergies   Allergen Reactions   • Sulfa Antibiotics Unknown - High Severity     Pt says it was a long time ago and I don't remember but it wasn't good.        Objective    Objective      Vital Signs  Temp:  [97.5 °F (36.4 °C)-98.4 °F (36.9 °C)] 98.4 °F (36.9 °C)  Heart Rate:  [64-79] 79  Resp:  [18-20] 18  BP: (138-207)/(61-94) 204/68  SpO2:  [95 %-100 %] 95 %  on   ;      Body mass index is 37.24 kg/m².    Physical Exam  Vitals and nursing note reviewed.   Constitutional:       General: She is not in acute distress.     Appearance: She is ill-appearing (chronically). She is not toxic-appearing.   HENT:      Head: Normocephalic and atraumatic.      Nose: Nose normal.      Mouth/Throat:      Mouth: Mucous membranes are moist.      Pharynx: Oropharynx is clear.   Eyes:      Conjunctiva/sclera: Conjunctivae normal.      Pupils: Pupils are equal, round, and reactive to light.   Cardiovascular:      Rate and Rhythm: Normal rate and regular rhythm.      Pulses: Normal pulses.   Pulmonary:      Effort: Pulmonary effort is normal.      Breath sounds: Normal breath sounds.   Abdominal:      General: Bowel sounds are normal. There is no distension.      Palpations: Abdomen is soft.      Tenderness: There is no abdominal tenderness.   Musculoskeletal:         General: Swelling and tenderness present.      Cervical back: Neck supple.      Comments: Brawny edema of BLE with tenderness   Skin:     General: Skin is warm and dry.      Findings: Erythema (BLE R>L) present.   Neurological:      Mental Status: She is alert and oriented to person, place, and time.   Psychiatric:         Mood and Affect: Mood normal.         Behavior: Behavior normal.         Results Review:  I reviewed the patient's new clinical results.  I reviewed the patient's new imaging results and agree with the interpretation.  I reviewed the patient's other test results and agree with the interpretation  I personally viewed and interpreted the patient's EKG/Telemetry data  Discussed with ED provider.    Lab Results (last 24 hours)       Procedure Component Value Units Date/Time    CBC & Differential [[id number]]  (Abnormal) Collected:  01/14/25 2002    Specimen: Blood Updated: 01/14/25 2017    Narrative:      The following orders were created for panel order CBC & Differential.  Procedure                               Abnormality         Status                     ---------                               -----------         ------                     CBC Auto Differential[[id number]]        Abnormal            Final result                 Please view results for these tests on the individual orders.    Comprehensive Metabolic Panel [[id number]]  (Abnormal) Collected: 01/14/25 2002    Specimen: Blood Updated: 01/14/25 2102     Glucose 63 mg/dL      BUN 41 mg/dL      Creatinine 2.48 mg/dL      Sodium 140 mmol/L      Potassium 3.5 mmol/L      Comment: Slight hemolysis detected by analyzer. Result may be falsely elevated.        Chloride 112 mmol/L      CO2 15.7 mmol/L      Calcium 7.7 mg/dL      Total Protein 6.8 g/dL      Albumin 3.2 g/dL      ALT (SGPT) 5 U/L      AST (SGOT) 13 U/L      Alkaline Phosphatase 129 U/L      Total Bilirubin <0.2 mg/dL      Globulin 3.6 gm/dL      A/G Ratio 0.9 g/dL      BUN/Creatinine Ratio 16.5     Anion Gap 12.3 mmol/L      eGFR 18.7 mL/min/1.73     Narrative:      GFR Categories in Chronic Kidney Disease (CKD)      GFR Category          GFR (mL/min/1.73)    Interpretation  G1                     90 or greater         Normal or high (1)  G2                      60-89                Mild decrease (1)  G3a                   45-59                Mild to moderate decrease  G3b                   30-44                Moderate to severe decrease  G4                    15-29                Severe decrease  G5                    14 or less           Kidney failure          (1)In the absence of evidence of kidney disease, neither GFR category G1 or G2 fulfill the criteria for CKD.    eGFR calculation 2021 CKD-EPI creatinine equation, which does not include race as a factor    Protime-INR [[id number]]  (Abnormal) Collected: 01/14/25  2002    Specimen: Blood Updated: 01/14/25 2025     Protime 14.7 Seconds      INR 1.13    BNP [[id number]]  (Normal) Collected: 01/14/25 2002    Specimen: Blood Updated: 01/14/25 2057     proBNP 1,467.0 pg/mL     Narrative:      This assay is used as an aid in the diagnosis of individuals suspected of having heart failure. It can be used as an aid in the diagnosis of acute decompensated heart failure (ADHF) in patients presenting with signs and symptoms of ADHF to the emergency department (ED). In addition, NT-proBNP of <300 pg/mL indicates ADHF is not likely.    Age Range Result Interpretation  NT-proBNP Concentration (pg/mL:      <50             Positive            >450                   Gray                 300-450                    Negative             <300    50-75           Positive            >900                  Gray                300-900                  Negative            <300      >75             Positive            >1800                  Gray                300-1800                  Negative            <300    D-dimer, Quantitative [[id number]]  (Abnormal) Collected: 01/14/25 2002    Specimen: Blood Updated: 01/14/25 2025     D-Dimer, Quantitative 2.66 MCGFEU/mL     Narrative:      According to the assay 's published package insert, a normal (<0.50 MCGFEU/mL) D-dimer result in conjunction with a non-high clinical probability assessment, excludes deep vein thrombosis (DVT) and pulmonary embolism (PE) with high sensitivity.    D-dimer values increase with age and this can make VTE exclusion of an older population difficult. To address this, the [employer], based on best available evidence and recent guidelines, recommends that clinicians use age-adjusted D-dimer thresholds in patients greater than 50 years of age with: a) a low probability of PE who do not meet all Pulmonary Embolism Rule Out Criteria, or b) in those with intermediate probability of PE.   The formula for an  "age-adjusted D-dimer cut-off is \"age/100\".  For example, a 60 year old patient would have an age-adjusted cut-off of 0.60 MCGFEU/mL and an 80 year old 0.80 MCGFEU/mL.    Lactic Acid, Plasma [[id number]]  (Normal) Collected: 01/14/25 2002    Specimen: Blood Updated: 01/14/25 2101     Lactate 1.3 mmol/L     Procalcitonin [[id number]]  (Normal) Collected: 01/14/25 2002    Specimen: Blood Updated: 01/14/25 2057     Procalcitonin 0.11 ng/mL     Narrative:      As a Marker for Sepsis (Non-Neonates):    1. <0.5 ng/mL represents a low risk of severe sepsis and/or septic shock.  2. >2 ng/mL represents a high risk of severe sepsis and/or septic shock.    As a Marker for Lower Respiratory Tract Infections that require antibiotic therapy:    PCT on Admission    Antibiotic Therapy       6-12 Hrs later    >0.5                Strongly Recommended  >0.25 - <0.5        Recommended   0.1 - 0.25          Discouraged              Remeasure/reassess PCT  <0.1                Strongly Discouraged     Remeasure/reassess PCT    As 28 day mortality risk marker: \"Change in Procalcitonin Result\" (>80% or <=80%) if Day 0 (or Day 1) and Day 4 values are available. Refer to http://www.[facility] [place]pct[place]cal[place]com    Change in PCT <=80%  A decrease of PCT levels below or equal to 80% defines a positive change in PCT test result representing a higher risk for 28-day all-cause mortality of patients diagnosed with severe sepsis for septic shock.    Change in PCT >80%  A decrease of PCT levels of more than 80% defines a negative change in PCT result representing a lower risk for 28-day all-cause mortality of patients diagnosed with severe sepsis or septic shock.       C-reactive Protein [[id number]]  (Abnormal) Collected: 01/14/25 2002    Specimen: Blood Updated: 01/14/25 2048     C-Reactive Protein 0.54 mg/dL     Magnesium [[id number]]  (Abnormal) Collected: 01/14/25 2002    Specimen: Blood Updated: 01/14/25 2048     Magnesium 1.5 mg/dL     Phosphorus " [[id number]]  (Abnormal) Collected: 01/14/25 2002    Specimen: Blood Updated: 01/14/25 2048     Phosphorus 4.7 mg/dL     CK [[id number]]  (Normal) Collected: 01/14/25 2002    Specimen: Blood Updated: 01/14/25 2048     Creatine Kinase 151 U/L     CBC Auto Differential [[id number]]  (Abnormal) Collected: 01/14/25 2002    Specimen: Blood Updated: 01/14/25 2017     WBC 12.99 10*3/mm3      RBC 3.35 10*6/mm3      Hemoglobin 9.4 g/dL      Hematocrit 28.6 %      MCV 85.4 fL      MCH 28.1 pg      MCHC 32.9 g/dL      RDW 14.9 %      RDW-SD 46.2 fl      MPV 9.4 fL      Platelets 303 10*3/mm3      Neutrophil % 86.0 %      Lymphocyte % 4.7 %      Monocyte % 6.2 %      Eosinophil % 1.6 %      Basophil % 0.8 %      Immature Grans % 0.7 %      Neutrophils, Absolute 11.17 10*3/mm3      Lymphocytes, Absolute 0.61 10*3/mm3      Monocytes, Absolute 0.81 10*3/mm3      Eosinophils, Absolute 0.21 10*3/mm3      Basophils, Absolute 0.10 10*3/mm3      Immature Grans, Absolute 0.09 10*3/mm3      nRBC 0.0 /100 WBC     POC Glucose Once [[id number]]  (Abnormal) Collected: 01/14/25 2014    Specimen: Blood Updated: 01/14/25 2017     Glucose 65 mg/dL     POC Glucose Once [[id number]]  (Normal) Collected: 01/14/25 2140    Specimen: Blood Updated: 01/14/25 2141     Glucose 90 mg/dL     Blood Culture - Blood, Arm, Right [[id number]] Collected: 01/14/25 2220    Specimen: Blood from Arm, Right Updated: 01/14/25 2226    POC Glucose Once [[id number]]  (Abnormal) Collected: 01/14/25 2232    Specimen: Blood Updated: 01/14/25 2234     Glucose 137 mg/dL     Blood Culture - Blood, Arm, Left [[id number]] Collected: 01/14/25 2247    Specimen: Blood from Arm, Left Updated: 01/14/25 2252    POC Glucose Once [[id number]]  (Abnormal) Collected: 01/15/25 0019    Specimen: Blood Updated: 01/15/25 0021     Glucose 157 mg/dL     CANDIDA AURIS PCR - Swab, Axilla Right, Axilla Left and Groin [[id number]] Collected: 01/15/25 0440    Specimen: Swab from Axilla Right, Axilla Left and  Nicole Updated: 01/15/25 0448    Comprehensive Metabolic Panel [[id number]]  (Abnormal) Collected: 01/15/25 0556    Specimen: Blood Updated: 01/15/25 0641     Glucose 160 mg/dL      BUN 44 mg/dL      Creatinine 2.43 mg/dL      Sodium 140 mmol/L      Potassium 3.8 mmol/L      Chloride 109 mmol/L      CO2 18.5 mmol/L      Calcium 8.4 mg/dL      Total Protein 7.0 g/dL      Albumin 3.3 g/dL      ALT (SGPT) 6 U/L      AST (SGOT) 10 U/L      Alkaline Phosphatase 138 U/L      Total Bilirubin 0.2 mg/dL      Globulin 3.7 gm/dL      A/G Ratio 0.9 g/dL      BUN/Creatinine Ratio 18.1     Anion Gap 12.5 mmol/L      eGFR 19.2 mL/min/1.73     Narrative:      GFR Categories in Chronic Kidney Disease (CKD)      GFR Category          GFR (mL/min/1.73)    Interpretation  G1                     90 or greater         Normal or high (1)  G2                      60-89                Mild decrease (1)  G3a                   45-59                Mild to moderate decrease  G3b                   30-44                Moderate to severe decrease  G4                    15-29                Severe decrease  G5                    14 or less           Kidney failure          (1)In the absence of evidence of kidney disease, neither GFR category G1 or G2 fulfill the criteria for CKD.    eGFR calculation 2021 CKD-EPI creatinine equation, which does not include race as a factor    CBC (No Diff) [[id number]]  (Abnormal) Collected: 01/15/25 0556    Specimen: Blood Updated: 01/15/25 0632     WBC 11.86 10*3/mm3      RBC 3.26 10*6/mm3      Hemoglobin 9.2 g/dL      Hematocrit 27.6 %      MCV 84.7 fL      MCH 28.2 pg      MCHC 33.3 g/dL      RDW 14.8 %      RDW-SD 45.0 fl      MPV 9.6 fL      Platelets 327 10*3/mm3             Imaging Results (Last 24 Hours)       ** No results found for the last 24 hours. **            Results for orders placed during the hospital encounter of 08/24/24    Adult Transthoracic Echo Complete W/ Cont if Necessary Per  Protocol    Interpretation Summary  •  Left ventricular systolic function is normal. Calculated left ventricular EF = 61.8% Normal left ventricular cavity size noted. Left ventricular wall thickness is consistent with moderate concentric hypertrophy. All left ventricular wall segments contract normally. Left ventricular diastolic function was normal.  •  The left atrial cavity is moderately dilated.  •  There is moderate calcification of the aortic valve. The aortic valve appears trileaflet. Trace aortic valve regurgitation is present. No aortic valve stenosis is present.  •  Moderate mitral annular calcification is present. Mild mitral valve regurgitation is present with multiple jets noted. No significant mitral valve stenosis is present.      Telemetry Scan   Final Result      Telemetry Scan   Final Result      Telemetry Scan   Final Result           Assessment/Plan     Active Hospital Problems    Diagnosis  POA   • **Cellulitis of right lower extremity [L03.115]  Yes   • CKD (chronic kidney disease) stage 4, GFR 15-29 ml/min [N18.4]  Yes   • Pancreatic neoplasm [D49.0]  Yes   • Gastroesophageal reflux disease [K21.9]  Yes   • Type 2 diabetes mellitus with hyperglycemia [E11.65]  Yes   • Severe hypothyroidism [E03.9]  Yes   • 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   RLE Cellulitis  - has chronic RLE wound, diabetic patient  - will cover with vancomycin and fortaz, blood and wound cx's sent  - provide pain control and local wound care    Type 2 DM  - complications include neuropathy and nephropathy  - had some hypoglycemia but BG now acceptable  - cover with ssi/hypoglycemia protocol low dose    CKD Stage 4  - baseline serum creatinine is 2.3-2.4  - monitor with above treatments  - continue  torsemide    Hypothyroidism  - continue levothyroxine      I discussed the patient's findings and my recommendations with patient, nursing staff, and care team on multidisciplinary rounds.    VTE Prophylaxis - Lovenox 30 mg SC daily.  Code Status - Full code.       Lewis Sheppard MD  [facility]  01/15/25  09:03 EST

## 2025-01-15 NOTE — ED NOTES
Patient to ed from [facility] for infection rt leg, pressure wrapped, on antibiotics. Experiencing warmth and pain. Ems found patient confused with BG of 40. They atarted a line and gave D10. Current BG is 263.

## 2025-01-15 NOTE — ED PROVIDER NOTES
EMERGENCY DEPARTMENT ENCOUNTER    Room Number:  N631/1  PCP: Napoleon Mead MD  Independent Historians: Patient    HPI:  Chief Complaint: had concerns including Leg Pain, Wound Check, and Hypoglycemia.      A complete HPI/ROS/PMH/PSH/SH/FH are unobtainable due to: None    Chronic or social conditions impacting patient care (Social Determinants of Health): None      Context: Halie Guidry is a 84 y.o. female with a medical history of diabetes, CHF, peripheral neuropathy, spinal stenosis, pedal edema who presents to the ED c/o acute concern for infection right lower extremity with an open ulceration.  Patient has been on antibiotics at her facility since Friday with worsening drainage and redness.  Patient's caregiver was cannot call EMS yesterday to bring patient to hospital but patient refused.  Today patient was amenable to coming in for further assessment.  Patient denies any history of DVT or PE and is not on any anticoagulation.  Patient denies any fevers, nausea, vomiting.  Patient does endorse fairly frequent episodes of hypoglycemia after not eating appropriately after taking her medications.  She thinks that is what happened today.  When EMS arrived patient's blood sugar was noted to be 40 and patient quite altered.  Patient given D10 en route with improvement.        Review of prior external notes (non-ED) -and- Review of prior external test results outside of this encounter: Telehealth visit with her primary provider earlier today with a plan for a motorized wheelchair.    I reviewed discharge summary from patient's last admission November 11, 2024.  Patient admitted at that time for CHF exacerbation.  Patient was initially on Bumex and switch to oral torsemide after nephrology evaluation.    Patient's admission prior to that was in October for hyperglycemia and confusion.  Patient found to have a urinary tract infection that grew Klebsiella ESBL so she was treated with Invanz.    Prescription drug  monitoring program review:     N/A    PAST MEDICAL HISTORY  Active Ambulatory Problems     Diagnosis Date Noted    Compression fracture 04/22/2009    Lumbar degenerative disc disease 07/05/2012    Type 2 diabetes mellitus with hyperglycemia, with long-term current use of insulin     Hyperlipidemia     Benign essential hypertension 08/20/2014    Primary hypothyroidism     Neuropathy     Osteoporosis 09/09/2015    Proteinuria 02/28/2012    Tobacco abuse 08/22/2013    Generalized weakness 05/27/2017    Spinal stenosis 05/27/2017    Scoliosis 05/27/2017    Arthritis 05/27/2017    VBI (vertebrobasilar insufficiency) 05/28/2017    Orthostatic hypotension 05/28/2017    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 05/28/2017    Severe hypothyroidism 05/30/2017    Noncompliance with medication regimen 05/30/2017    Vitamin D deficiency disease 06/01/2017    Tremor 01/09/2018    Seizure 05/21/2019    Thoracic degenerative disc disease 01/27/2020    Acute kidney injury (VIPUL) with acute tubular necrosis (ATN) 12/02/2021    Hyperglycemia 12/02/2021    Type II diabetes mellitus, uncontrolled 12/02/2021    Lower abdominal pain 02/23/2022    Transaminitis 02/23/2022    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    Choledocholithiasis 02/23/2022    Hypokalemia 02/24/2022    Hypoxia 02/24/2022    Generalized abdominal pain 03/26/2022    History of Clostridium difficile infection 03/26/2022    History of ERCP 03/26/2022    Hypomagnesemia 03/28/2022    Anxiety disorder 05/27/2022    Neuropathic pain 05/27/2022    Intertrigo 05/27/2022    Weakness of both lower extremities 06/24/2022    Accelerated hypertension 09/01/2022    Acute cystitis without hematuria 09/06/2022    Left lower lobe pneumonia 11/04/2022    Cellulitis and abscess of left lower extremity 05/15/2023    Benign hypertension with CKD (chronic kidney disease) stage IV 06/02/2023    Peripheral edema 07/30/2023    Primary malignant neuroendocrine tumor of pancreas  08/24/2023    Encounter for long-term (current) use of high-risk medication 08/28/2023    Diabetic foot ulcer 12/24/2023    Stage 3a chronic kidney disease 01/28/2024    Pressure injury of buttock, stage 2 01/28/2024    HTN (hypertension) 04/21/2024    Anemia due to chronic kidney disease 05/17/2024    Bilateral lower extremity edema 08/24/2024    Cellulitis of right lower extremity 08/24/2024    CKD (chronic kidney disease) stage 4, GFR 15-29 ml/min 08/24/2024    Acute CHF 08/24/2024    Bilateral cellulitis of lower leg 08/25/2024    Acute UTI 10/10/2024    UTI (urinary tract infection) 10/10/2024    Acute UTI (urinary tract infection) 10/11/2024    Metabolic acidosis 10/11/2024    Cobalamin deficiency 11/30/2022    Dependent edema 11/30/2022    Gastroesophageal reflux disease 11/30/2022    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    Pancreatic neoplasm 08/17/2023    Congestive heart failure 11/30/2022    CHF exacerbation 11/05/2024    Acute exacerbation of CHF (congestive heart failure) 11/05/2024     Resolved Ambulatory Problems     Diagnosis Date Noted    Leukocytosis     Diabetic eye exam 02/12/2014    Altered mental status 12/15/2017    Stage 3a chronic kidney disease 03/09/2012    Metabolic encephalopathy 12/02/2021    COVID-19 virus detected 02/23/2022    UTI (urinary tract infection) 02/23/2022    Acute UTI (urinary tract infection) 03/27/2022    Burning pain 05/27/2022    Acute metabolic encephalopathy 06/22/2022    Hypoglycemia 06/23/2022    Acute metabolic encephalopathy 06/24/2022    Altered mental status, unspecified altered mental status type 11/04/2022    Diarrhea 11/04/2022    Acute pain of left foot 05/13/2023    Bilateral leg pain 07/21/2023    Acute renal insufficiency 01/09/2024    COVID-19 01/10/2024    Cytokine release syndrome, grade 1 01/15/2024    C. difficile diarrhea 01/15/2024    Sepsis 01/27/2024    Metabolic encephalopathy 01/28/2024    Uncontrolled diabetes mellitus with hyperglycemia  2024    Pseudohyponatremia 2024     Past Medical History:   Diagnosis Date    Anxiety     Bleeding disorder     Depression     Diabetes     Diabetes mellitus, type 2     Disc degeneration, lumbar     Headache, tension-type     Hypothyroidism     Pancreatic mass     Peripheral neuropathy     Rotator cuff tear, left     Shoulder pain          PAST SURGICAL HISTORY  Past Surgical History:   Procedure Laterality Date    APPENDECTOMY      BILATERAL BREAST REDUCTION Bilateral 2015    CATARACT EXTRACTION  2015    CHOLECYSTECTOMY WITH INTRAOPERATIVE CHOLANGIOGRAM N/A 3/27/2022    Procedure: CHOLECYSTECTOMY LAPAROSCOPIC INTRAOPERATIVE CHOLANGIOGRAM;  Surgeon: Aiyana Hill MD;  Location: [facility] MAIN OR;  Service: General;  Laterality: N/A;    COLONOSCOPY  2015    WNL    ERCP N/A 2022    Procedure: ENDOSCOPIC RETROGRADE CHOLANGIOPANCREATOGRAPHY with sphincterotomy and balloon sweep;  Surgeon: Chilo Wilhelm MD;  Location: [facility] ENDOSCOPY;  Service: Gastroenterology;  Laterality: N/A;  PRE/POST - CBD stones    ERCP N/A 3/28/2022    Procedure: ENDOSCOPIC RETROGRADE CHOLANGIOPANCREATOGRAPHY WITH SPHINCTEROTOMY AND BALLOON SWEEP;  Surgeon: Chilo Wilhelm MD;  Location: [facility] ENDOSCOPY;  Service: Gastroenterology;  Laterality: N/A;  PRE: COMMON DUCT STONE  POST: COMMON DUCT STONE    KYPHOPLASTY      REDUCTION MAMMAPLASTY      TONSILLECTOMY           FAMILY HISTORY  Family History   Problem Relation Age of Onset    Bone cancer Mother     No Known Problems Father     Heart disease Daughter          SOCIAL HISTORY  Social History     Socioeconomic History    Marital status:     Number of children: 3   Tobacco Use    Smoking status: Former     Current packs/day: 0.00     Average packs/day: 1 pack/day for 40.0 years (40.0 ttl pk-yrs)     Types: Cigarettes     Start date:      Quit date:      Years since quittin.0    Smokeless tobacco: Never   Vaping Use    Vaping  status: Never Used   Substance and Sexual Activity    Alcohol use: No    Drug use: No    Sexual activity: Defer         ALLERGIES  Sulfa antibiotics        REVIEW OF SYSTEMS  Review of Systems  Included in HPI  All systems reviewed and negative except for those discussed in HPI.      PHYSICAL EXAM    I have reviewed the triage vital signs and nursing notes.    ED Triage Vitals [01/14/25 1915]   Temp Heart Rate Resp BP SpO2   97.8 °F (36.6 °C) 64 20 167/61 98 %      Temp src Heart Rate Source Patient Position BP Location FiO2 (%)   -- -- -- -- --       Physical Exam  GENERAL: Cooperative and conversant female of advanced age, alert, no acute distress  SKIN: Warm, dry, bilateral lower extremities skin thickening and erythema right lower extremity worse than left with shallow ulceration mid right lower extremity lateral calf with purulent drainage  HENT: Normocephalic, atraumatic  EYES: no scleral icterus  CV: regular rhythm, regular rate  RESPIRATORY: normal effort, lungs clear no wheezing  ABDOMEN: soft, nontender, nondistended  MUSCULOSKELETAL: Both lower extremities edematous as noted above with skin changes as noted above  NEURO: alert, moves all extremities, follows commands                                                                   LAB RESULTS  Recent Results (from the past 24 hours)   Comprehensive Metabolic Panel    Collection Time: 01/14/25  8:02 PM    Specimen: Blood   Result Value Ref Range    Glucose 63 (L) 65 - 99 mg/dL    BUN 41 (H) 8 - 23 mg/dL    Creatinine 2.48 (H) 0.57 - 1.00 mg/dL    Sodium 140 136 - 145 mmol/L    Potassium 3.5 3.5 - 5.2 mmol/L    Chloride 112 (H) 98 - 107 mmol/L    CO2 15.7 (L) 22.0 - 29.0 mmol/L    Calcium 7.7 (L) 8.6 - 10.5 mg/dL    Total Protein 6.8 6.0 - 8.5 g/dL    Albumin 3.2 (L) 3.5 - 5.2 g/dL    ALT (SGPT) 5 1 - 33 U/L    AST (SGOT) 13 1 - 32 U/L    Alkaline Phosphatase 129 (H) 39 - 117 U/L    Total Bilirubin <0.2 0.0 - 1.2 mg/dL    Globulin 3.6 gm/dL    A/G Ratio  0.9 g/dL    BUN/Creatinine Ratio 16.5 7.0 - 25.0    Anion Gap 12.3 5.0 - 15.0 mmol/L    eGFR 18.7 (L) >60.0 mL/min/1.73   Protime-INR    Collection Time: 01/14/25  8:02 PM    Specimen: Blood   Result Value Ref Range    Protime 14.7 (H) 11.7 - 14.2 Seconds    INR 1.13 (H) 0.90 - 1.10   BNP    Collection Time: 01/14/25  8:02 PM    Specimen: Blood   Result Value Ref Range    proBNP 1,467.0 0.0 - 1,800.0 pg/mL   D-dimer, Quantitative    Collection Time: 01/14/25  8:02 PM    Specimen: Blood   Result Value Ref Range    D-Dimer, Quantitative 2.66 (H) 0.00 - 0.84 MCGFEU/mL   Lactic Acid, Plasma    Collection Time: 01/14/25  8:02 PM    Specimen: Blood   Result Value Ref Range    Lactate 1.3 0.5 - 2.0 mmol/L   Procalcitonin    Collection Time: 01/14/25  8:02 PM    Specimen: Blood   Result Value Ref Range    Procalcitonin 0.11 0.00 - 0.25 ng/mL   C-reactive Protein    Collection Time: 01/14/25  8:02 PM    Specimen: Blood   Result Value Ref Range    C-Reactive Protein 0.54 (H) 0.00 - 0.50 mg/dL   Magnesium    Collection Time: 01/14/25  8:02 PM    Specimen: Blood   Result Value Ref Range    Magnesium 1.5 (L) 1.6 - 2.4 mg/dL   Phosphorus    Collection Time: 01/14/25  8:02 PM    Specimen: Blood   Result Value Ref Range    Phosphorus 4.7 (H) 2.5 - 4.5 mg/dL   CK    Collection Time: 01/14/25  8:02 PM    Specimen: Blood   Result Value Ref Range    Creatine Kinase 151 20 - 180 U/L   CBC Auto Differential    Collection Time: 01/14/25  8:02 PM    Specimen: Blood   Result Value Ref Range    WBC 12.99 (H) 3.40 - 10.80 10*3/mm3    RBC 3.35 (L) 3.77 - 5.28 10*6/mm3    Hemoglobin 9.4 (L) 12.0 - 15.9 g/dL    Hematocrit 28.6 (L) 34.0 - 46.6 %    MCV 85.4 79.0 - 97.0 fL    MCH 28.1 26.6 - 33.0 pg    MCHC 32.9 31.5 - 35.7 g/dL    RDW 14.9 12.3 - 15.4 %    RDW-SD 46.2 37.0 - 54.0 fl    MPV 9.4 6.0 - 12.0 fL    Platelets 303 140 - 450 10*3/mm3    Neutrophil % 86.0 (H) 42.7 - 76.0 %    Lymphocyte % 4.7 (L) 19.6 - 45.3 %    Monocyte % 6.2 5.0 - 12.0  %    Eosinophil % 1.6 0.3 - 6.2 %    Basophil % 0.8 0.0 - 1.5 %    Immature Grans % 0.7 (H) 0.0 - 0.5 %    Neutrophils, Absolute 11.17 (H) 1.70 - 7.00 10*3/mm3    Lymphocytes, Absolute 0.61 (L) 0.70 - 3.10 10*3/mm3    Monocytes, Absolute 0.81 0.10 - 0.90 10*3/mm3    Eosinophils, Absolute 0.21 0.00 - 0.40 10*3/mm3    Basophils, Absolute 0.10 0.00 - 0.20 10*3/mm3    Immature Grans, Absolute 0.09 (H) 0.00 - 0.05 10*3/mm3    nRBC 0.0 0.0 - 0.2 /100 WBC   POC Glucose Once    Collection Time: 01/14/25  8:14 PM    Specimen: Blood   Result Value Ref Range    Glucose 65 (L) 70 - 130 mg/dL   POC Glucose Once    Collection Time: 01/14/25  9:40 PM    Specimen: Blood   Result Value Ref Range    Glucose 90 70 - 130 mg/dL   POC Glucose Once    Collection Time: 01/14/25 10:32 PM    Specimen: Blood   Result Value Ref Range    Glucose 137 (H) 70 - 130 mg/dL   POC Glucose Once    Collection Time: 01/15/25 12:19 AM    Specimen: Blood   Result Value Ref Range    Glucose 157 (H) 70 - 130 mg/dL         RADIOLOGY  No Radiology Exams Resulted Within Past 24 Hours      MEDICATIONS GIVEN IN ER  Medications   sodium chloride 0.9 % flush 10 mL (has no administration in time range)   sodium chloride 0.9 % flush 10 mL (has no administration in time range)   nitroglycerin (NITROSTAT) SL tablet 0.4 mg (has no administration in time range)   acetaminophen (TYLENOL) tablet 650 mg (650 mg Oral Given 1/15/25 0301)     Or   acetaminophen (TYLENOL) 160 MG/5ML oral solution 650 mg ( Oral Not Given:  See Alt 1/15/25 0301)     Or   acetaminophen (TYLENOL) suppository 650 mg ( Rectal Not Given:  See Alt 1/15/25 0301)   sennosides-docusate (PERICOLACE) 8.6-50 MG per tablet 2 tablet (has no administration in time range)     And   polyethylene glycol (MIRALAX) packet 17 g (has no administration in time range)     And   bisacodyl (DULCOLAX) EC tablet 5 mg (has no administration in time range)     And   bisacodyl (DULCOLAX) suppository 10 mg (has no  administration in time range)   melatonin tablet 5 mg (has no administration in time range)   ondansetron ODT (ZOFRAN-ODT) disintegrating tablet 4 mg (has no administration in time range)     Or   ondansetron (ZOFRAN) injection 4 mg (has no administration in time range)   aluminum-magnesium hydroxide-simethicone (MAALOX MAX) 400-400-40 MG/5ML suspension 15 mL (has no administration in time range)   Pharmacy to Dose enoxaparin (LOVENOX) (has no administration in time range)   Enoxaparin Sodium (LOVENOX) syringe 30 mg (has no administration in time range)   labetalol (NORMODYNE,TRANDATE) injection 10 mg (10 mg Intravenous Given 1/15/25 8092)   vancomycin 1750 mg/500 mL 0.9% NS IVPB ([facility]) (1,750 mg Intravenous New Bag 1/14/25 5716)   Enoxaparin Sodium (LOVENOX) syringe 90 mg (90 mg Subcutaneous Given 1/14/25 6914)         ORDERS PLACED DURING THIS VISIT:  Orders Placed This Encounter   Procedures    Blood Culture - Blood,    Blood Culture - Blood,    [facility] PCR - Swab, Axilla Right, Axilla Left and Groin    Comprehensive Metabolic Panel    Protime-INR    BNP    D-dimer, Quantitative    Lactic Acid, Plasma    Procalcitonin    C-reactive Protein    Magnesium    Phosphorus    CK    CBC Auto Differential    Comprehensive Metabolic Panel    CBC (No Diff)    Diet: Cardiac, Diabetic; Healthy Heart (2-3 Na+); Consistent Carbohydrate; Fluid Consistency: Thin (IDDSI 0)    Vital Signs    Intake & Output    Oral Care    Maintain IV Access    Telemetry - Place Orders & Notify Provider of Results When Patient Experiences Acute Chest Pain, Dysrhythmia or Respiratory Distress    May Be Off Telemetry for Tests    Up With Assistance    Code Status and Medical Interventions: CPR (Attempt to Resuscitate); Full Support    [facility] (on-call MD unless specified) Details    Inpatient Nutrition Consult    OT Consult: Eval & Treat ADL Performance Below Baseline    PT Consult: Eval & Treat Functional Mobility Below Baseline    Incentive  Spirometry    POC Glucose Finger Q1H    POC Glucose Once    POC Glucose Once    POC Glucose Finger Q1H    POC Glucose Once    POC Glucose Once    Telemetry Scan    Wound Ostomy Eval & Treat    Insert Peripheral IV    Initiate Observation Status    CBC & Differential         OUTPATIENT MEDICATION MANAGEMENT:  Current Facility-Administered Medications Ordered in Epic   Medication Dose Route Frequency Provider Last Rate Last Admin    acetaminophen (TYLENOL) tablet 650 mg  650 mg Oral Q4H PRN Ceci Rma APRN   650 mg at 01/15/25 0301    Or    acetaminophen (TYLENOL) 160 MG/5ML oral solution 650 mg  650 mg Oral Q4H PRN Ceci Ram APRN        Or    acetaminophen (TYLENOL) suppository 650 mg  650 mg Rectal Q4H PRN Ceci Ram APRN        aluminum-magnesium hydroxide-simethicone (MAALOX MAX) 400-400-40 MG/5ML suspension 15 mL  15 mL Oral Q6H PRN Ceci Ram APRN        sennosides-docusate (PERICOLACE) 8.6-50 MG per tablet 2 tablet  2 tablet Oral BID PRN Ceci Ram APRN        And    polyethylene glycol (MIRALAX) packet 17 g  17 g Oral Daily PRN Ceci Ram APRN        And    bisacodyl (DULCOLAX) EC tablet 5 mg  5 mg Oral Daily PRN Ceci Ram APRN        And    bisacodyl (DULCOLAX) suppository 10 mg  10 mg Rectal Daily PRN Ceci Ram APRN        Enoxaparin Sodium (LOVENOX) syringe 30 mg  30 mg Subcutaneous Nightly Ceci Ram APRN        labetalol (NORMODYNE,TRANDATE) injection 10 mg  10 mg Intravenous Q6H PRN Beena Morgan APRN   10 mg at 01/15/25 0432    melatonin tablet 5 mg  5 mg Oral Nightly PRN Ceci Ram APRN        nitroglycerin (NITROSTAT) SL tablet 0.4 mg  0.4 mg Sublingual Q5 Min PRN Ceci Ram APRN        ondansetron ODT (ZOFRAN-ODT) disintegrating tablet 4 mg  4 mg Oral Q6H PRN Ceci Ram APRN        Or    ondansetron (ZOFRAN) injection 4 mg  4 mg Intravenous Q6H PRN Ceci Ram,  APRN        Pharmacy to Dose enoxaparin (LOVENOX)   Not Applicable Continuous PRN Ceci Ram, SERGE        sodium chloride 0.9 % flush 10 mL  10 mL Intravenous PRN Kelly Mejia MD        sodium chloride 0.9 % flush 10 mL  10 mL Intravenous PRN Ceci Ram, SERGE         No current [facility]-ordered outpatient medications on file.         PROCEDURES  Procedures            PROGRESS, DATA ANALYSIS, CONSULTS, AND MEDICAL DECISION MAKING  All labs have been independently interpreted by me.  All radiology studies have been reviewed by me. All EKG's have been independently viewed and interpreted by me.  Discussion below represents my analysis of pertinent findings related to patient's condition, differential diagnosis, treatment plan and final disposition.    This patient presents with lower extremity erythema, warmth, and swelling concerning for cellulitis. Patient does have sensitivity/pain to light touch around the erythematous and ulcerated area. No streaking nor exam evidence of fluctuance concerning for abscess noted. Low concern for osteomyelitis or DVT but will obtain d dimer. No immune compromise, bullae, pain out of proportion, or rapid progression concerning for necrotizing fasciitis. Calf is soft and no concern for compartment syndrome.      Wells score for DVT  Add all selected points (No = 0, Yes = 1)  1.  Active Cancer (treatment or palliation within 6 months)--NO  2. Bedridden recently > 3 days or major surgery within 12 weeks--NO  3. Calf swelling >3cm compared to other leg (measured 10cm below tibila                    tuberosity)--NO  4. Collateral (non-varicose) superficial veins present--NO  5. Entire leg swollen--NO  6. Localized tenderness along the deep vein system--NO  7. Pitting edema, confined to symptomatic leg--NO  8. Paralysis, paresis, or recent plaster immobilization of the lower                     extremity--NO  A score of 0 or lower is associated with a prevalence of  DVT 5%, if also negative d-dimer then probability of DVT <1% and no further imaging required.  Proceed to US if d-dimer is +.  A score of 1-2 is considered moderate risk with a pretest probability of 17%,  if also negative high sensitivity  d-dimer then probability of DVT <1% and no further imaging required.  Proceed to US if d-dimer is +.  A score of 3 or higher suggests DVT likely with pretest probability of 17-53%.  All need US.  D-dimer can be used to risk stratify patients.  Neg dimer + neg ultrasound = discharge.  Pos dimer + neg ultrasound = repeat US within 1 week.    ED Course as of 01/15/25 0513   Tue Jan 14, 2025 2018 Accu-Chek 65--plan po food [AR]   2121 Positive dimer.  Patient's leg certainly appears infected but to cover for the possibility of DVT plan for treatment dose Lovenox until ultrasound can be performed. [AR]   2202 Chronic anemia stable, ckd stable [AR]   2202 Repeat accu check 90--will cont to monitor [AR]   2252 Repeat accu check 137 [AR]   2354 I discussed patient's case with Ceci bangura for A who is amenable to admitting to Dr. Bledsoe. [AR]      ED Course User Index  [AR] Kelly Mejia MD             AS OF 05:13 EST VITALS:    BP - (!) 203/68  HR - 71  TEMP - 97.5 °F (36.4 °C) (Oral)  O2 SATS - 100%      COMPLEXITY OF CARE  The patient requires admission.    DIAGNOSIS  Final diagnoses:   Cellulitis of right leg   Ulcer of right lower extremity, unspecified ulcer stage   Hypoglycemia   Bilateral lower extremity edema         DISPOSITION  ED Disposition       ED Disposition   Decision to Admit    Condition   --    Comment   Level of Care: Telemetry [5]   Diagnosis: Cellulitis of right lower extremity [379711]   Admitting Physician: SANDRA WILKERSON [[id number]]   Attending Physician: SANDRA WILKERSON [[id number]]   Is patient appropriate for Inpatient Observation Unit?: No [0]                  Please note that portions of this document were completed with a voice recognition  program.    Note Disclaimer: At [facility], we believe that sharing information builds trust and better relationships. You are receiving this note because you recently visited [facility]. It is possible you will see health information before a provider has talked with you about it. This kind of information can be easy to misunderstand. To help you fully understand what it means for your health, we urge you to discuss this note with your provider.         Kelly Mejia MD  01/15/25 8353

## 2025-01-15 NOTE — TELEPHONE ENCOUNTER
Lupe requesting verbal orders to continue care of tx of wound on leg.    Lupe advised pt is currently admitted r/t cellulitis. Updated order will follow after d/c.

## 2025-01-15 NOTE — SIGNIFICANT NOTE
01/15/25 1454   OTHER   Discipline occupational therapist   Rehab Time/Intention   Session Not Performed patient/family declined evaluation  (Patient declined OT AM and PM attempt at OT evaluation completion, stating she does not feel well. OT will follow up for evaluation completion 1/16/2025.)   Recommendation   OT - Next Appointment 01/16/25

## 2025-01-16 LAB
ANION GAP SERPL CALCULATED.3IONS-SCNC: 15.4 MMOL/L (ref 5–15)
BASOPHILS # BLD AUTO: 0.1 10*3/MM3 (ref 0–0.2)
BASOPHILS NFR BLD AUTO: 1.2 % (ref 0–1.5)
BUN SERPL-MCNC: 45 MG/DL (ref 8–23)
BUN/CREAT SERPL: 17.2 (ref 7–25)
C AURIS DNA SPEC QL NAA+NON-PROBE: NOT DETECTED
CALCIUM SPEC-SCNC: 8.1 MG/DL (ref 8.6–10.5)
CHLORIDE SERPL-SCNC: 109 MMOL/L (ref 98–107)
CO2 SERPL-SCNC: 19.6 MMOL/L (ref 22–29)
CREAT SERPL-MCNC: 2.61 MG/DL (ref 0.57–1)
DEPRECATED RDW RBC AUTO: 47 FL (ref 37–54)
EGFRCR SERPLBLD CKD-EPI 2021: 17.6 ML/MIN/1.73
EOSINOPHIL # BLD AUTO: 0.57 10*3/MM3 (ref 0–0.4)
EOSINOPHIL NFR BLD AUTO: 6.6 % (ref 0.3–6.2)
ERYTHROCYTE [DISTWIDTH] IN BLOOD BY AUTOMATED COUNT: 15 % (ref 12.3–15.4)
GLUCOSE BLDC GLUCOMTR-MCNC: 120 MG/DL (ref 70–130)
GLUCOSE BLDC GLUCOMTR-MCNC: 203 MG/DL (ref 70–130)
GLUCOSE BLDC GLUCOMTR-MCNC: 206 MG/DL (ref 70–130)
GLUCOSE BLDC GLUCOMTR-MCNC: 255 MG/DL (ref 70–130)
GLUCOSE SERPL-MCNC: 127 MG/DL (ref 65–99)
HCT VFR BLD AUTO: 26.8 % (ref 34–46.6)
HGB BLD-MCNC: 8.3 G/DL (ref 12–15.9)
IMM GRANULOCYTES # BLD AUTO: 0.03 10*3/MM3 (ref 0–0.05)
IMM GRANULOCYTES NFR BLD AUTO: 0.3 % (ref 0–0.5)
LYMPHOCYTES # BLD AUTO: 1.13 10*3/MM3 (ref 0.7–3.1)
LYMPHOCYTES NFR BLD AUTO: 13 % (ref 19.6–45.3)
MCH RBC QN AUTO: 26.7 PG (ref 26.6–33)
MCHC RBC AUTO-ENTMCNC: 31 G/DL (ref 31.5–35.7)
MCV RBC AUTO: 86.2 FL (ref 79–97)
MONOCYTES # BLD AUTO: 0.84 10*3/MM3 (ref 0.1–0.9)
MONOCYTES NFR BLD AUTO: 9.7 % (ref 5–12)
NEUTROPHILS NFR BLD AUTO: 5.99 10*3/MM3 (ref 1.7–7)
NEUTROPHILS NFR BLD AUTO: 69.2 % (ref 42.7–76)
NRBC BLD AUTO-RTO: 0 /100 WBC (ref 0–0.2)
PLATELET # BLD AUTO: 284 10*3/MM3 (ref 140–450)
PMV BLD AUTO: 10 FL (ref 6–12)
POTASSIUM SERPL-SCNC: 3.8 MMOL/L (ref 3.5–5.2)
RBC # BLD AUTO: 3.11 10*6/MM3 (ref 3.77–5.28)
SODIUM SERPL-SCNC: 144 MMOL/L (ref 136–145)
VANCOMYCIN SERPL-MCNC: 18.6 MCG/ML (ref 5–40)
WBC NRBC COR # BLD AUTO: 8.66 10*3/MM3 (ref 3.4–10.8)

## 2025-01-16 PROCEDURE — 87186 SC STD MICRODIL/AGAR DIL: CPT | Performed by: INTERNAL MEDICINE

## 2025-01-16 PROCEDURE — 87181 SC STD AGAR DILUTION PER AGT: CPT | Performed by: INTERNAL MEDICINE

## 2025-01-16 PROCEDURE — 82948 REAGENT STRIP/BLOOD GLUCOSE: CPT

## 2025-01-16 PROCEDURE — 87070 CULTURE OTHR SPECIMN AEROBIC: CPT | Performed by: INTERNAL MEDICINE

## 2025-01-16 PROCEDURE — 25010000002 ENOXAPARIN PER 10 MG: Performed by: NURSE PRACTITIONER

## 2025-01-16 PROCEDURE — 87205 SMEAR GRAM STAIN: CPT | Performed by: INTERNAL MEDICINE

## 2025-01-16 PROCEDURE — 97530 THERAPEUTIC ACTIVITIES: CPT

## 2025-01-16 PROCEDURE — 85025 COMPLETE CBC W/AUTO DIFF WBC: CPT | Performed by: INTERNAL MEDICINE

## 2025-01-16 PROCEDURE — 97162 PT EVAL MOD COMPLEX 30 MIN: CPT

## 2025-01-16 PROCEDURE — 80048 BASIC METABOLIC PNL TOTAL CA: CPT | Performed by: INTERNAL MEDICINE

## 2025-01-16 PROCEDURE — 87077 CULTURE AEROBIC IDENTIFY: CPT | Performed by: INTERNAL MEDICINE

## 2025-01-16 PROCEDURE — 25010000002 VANCOMYCIN 750 MG RECONSTITUTED SOLUTION 1 EACH VIAL: Performed by: INTERNAL MEDICINE

## 2025-01-16 PROCEDURE — 25010000002 LABETALOL 5 MG/ML SOLUTION: Performed by: NURSE PRACTITIONER

## 2025-01-16 PROCEDURE — 97166 OT EVAL MOD COMPLEX 45 MIN: CPT

## 2025-01-16 PROCEDURE — 25810000003 SODIUM CHLORIDE 0.9 % SOLUTION 250 ML FLEX CONT: Performed by: INTERNAL MEDICINE

## 2025-01-16 PROCEDURE — 80202 ASSAY OF VANCOMYCIN: CPT | Performed by: INTERNAL MEDICINE

## 2025-01-16 PROCEDURE — 25010000002 CEFTAZIDIME 2 G RECONSTITUTED SOLUTION 1 EACH VIAL: Performed by: INTERNAL MEDICINE

## 2025-01-16 PROCEDURE — 63710000001 INSULIN LISPRO (HUMAN) PER 5 UNITS: Performed by: INTERNAL MEDICINE

## 2025-01-16 RX ADMIN — INSULIN LISPRO 4 UNITS: 100 INJECTION, SOLUTION INTRAVENOUS; SUBCUTANEOUS at 18:16

## 2025-01-16 RX ADMIN — Medication 1 APPLICATION: at 20:42

## 2025-01-16 RX ADMIN — ZINC OXIDE 1 APPLICATION: 200 OINTMENT TOPICAL at 20:41

## 2025-01-16 RX ADMIN — PANTOPRAZOLE SODIUM 40 MG: 40 TABLET, DELAYED RELEASE ORAL at 05:43

## 2025-01-16 RX ADMIN — ACETAMINOPHEN 650 MG: 325 TABLET, FILM COATED ORAL at 05:43

## 2025-01-16 RX ADMIN — BISOPROLOL FUMARATE 5 MG: 5 TABLET ORAL at 09:18

## 2025-01-16 RX ADMIN — TERAZOSIN HYDROCHLORIDE 1 MG: 1 CAPSULE ORAL at 20:39

## 2025-01-16 RX ADMIN — LABETALOL HYDROCHLORIDE 10 MG: 5 INJECTION, SOLUTION INTRAVENOUS at 02:36

## 2025-01-16 RX ADMIN — ACETAMINOPHEN 650 MG: 325 TABLET, FILM COATED ORAL at 10:31

## 2025-01-16 RX ADMIN — LABETALOL HYDROCHLORIDE 10 MG: 5 INJECTION, SOLUTION INTRAVENOUS at 09:18

## 2025-01-16 RX ADMIN — ZINC OXIDE 1 APPLICATION: 200 OINTMENT TOPICAL at 09:20

## 2025-01-16 RX ADMIN — PREGABALIN 75 MG: 75 CAPSULE ORAL at 20:39

## 2025-01-16 RX ADMIN — Medication 1 APPLICATION: at 09:20

## 2025-01-16 RX ADMIN — TORSEMIDE 40 MG: 20 TABLET ORAL at 09:18

## 2025-01-16 RX ADMIN — LEVOTHYROXINE SODIUM 137 MCG: 0.03 TABLET ORAL at 05:43

## 2025-01-16 RX ADMIN — INSULIN LISPRO 3 UNITS: 100 INJECTION, SOLUTION INTRAVENOUS; SUBCUTANEOUS at 12:09

## 2025-01-16 RX ADMIN — ACETAMINOPHEN 650 MG: 325 TABLET, FILM COATED ORAL at 17:21

## 2025-01-16 RX ADMIN — FLUTICASONE PROPIONATE 2 SPRAY: 50 SPRAY, METERED NASAL at 09:20

## 2025-01-16 RX ADMIN — ENOXAPARIN SODIUM 30 MG: 100 INJECTION SUBCUTANEOUS at 20:38

## 2025-01-16 RX ADMIN — TORSEMIDE 40 MG: 20 TABLET ORAL at 20:40

## 2025-01-16 RX ADMIN — ATORVASTATIN CALCIUM 80 MG: 80 TABLET, FILM COATED ORAL at 20:40

## 2025-01-16 RX ADMIN — INSULIN LISPRO 2 UNITS: 100 INJECTION, SOLUTION INTRAVENOUS; SUBCUTANEOUS at 20:39

## 2025-01-16 RX ADMIN — VANCOMYCIN HYDROCHLORIDE 750 MG: 750 INJECTION, POWDER, LYOPHILIZED, FOR SOLUTION INTRAVENOUS at 14:57

## 2025-01-16 RX ADMIN — CEFTAZIDIME 2000 MG: 2 INJECTION, POWDER, FOR SOLUTION INTRAVENOUS at 09:18

## 2025-01-16 RX ADMIN — PREGABALIN 75 MG: 75 CAPSULE ORAL at 09:18

## 2025-01-16 NOTE — PLAN OF CARE
Goal Outcome Evaluation:               Medication given twice for bp systolic greater than 170. Bed alarm set. Patient turning side to side to prevent skin breakdown. Contact precautions maintained. Medicated once for headache which was effective. No other complaints throughout night and patient appeared asleep much of the night.

## 2025-01-16 NOTE — THERAPY EVALUATION
Patient Name: Halie Guidry  : 1940    MRN: [id number]                              Today's Date: 2025       Admit Date: 2025    Visit Dx:     ICD-10-CM ICD-9-CM   1. Cellulitis of right leg  L03.115 682.6   2. Ulcer of right lower extremity, unspecified ulcer stage  L97.919 707.10   3. Hypoglycemia  E16.2 251.2   4. Bilateral lower extremity edema  R60.0 782.3   5. Decreased activities of daily living (ADL)  Z78.9 V49.89     Patient Active Problem List   Diagnosis    Compression fracture    Lumbar degenerative disc disease    Type 2 diabetes mellitus with hyperglycemia, with long-term current use of insulin    Hyperlipidemia    Benign essential hypertension    Primary hypothyroidism    Neuropathy    Osteoporosis    Proteinuria    Tobacco abuse    Generalized weakness    Spinal stenosis    Scoliosis    Arthritis    VBI (vertebrobasilar insufficiency)    Orthostatic hypotension    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    Severe hypothyroidism    Noncompliance with medication regimen    Vitamin D deficiency disease    Tremor    Seizure    Thoracic degenerative disc disease    Acute kidney injury (VIPUL) with acute tubular necrosis (ATN)    Hyperglycemia    Type 2 diabetes mellitus with hyperglycemia    Lower abdominal pain    Transaminitis    Emphysematous cystitis    Choledocholithiasis    Hypokalemia    Hypoxia    Generalized abdominal pain    History of Clostridium difficile infection    History of ERCP    Hypomagnesemia    Anxiety disorder    Neuropathic pain    Intertrigo    Weakness of both lower extremities    Accelerated hypertension    Acute cystitis without hematuria    Left lower lobe pneumonia    Cellulitis and abscess of left lower extremity    Benign hypertension with CKD (chronic kidney disease) stage IV    Peripheral edema    Primary malignant neuroendocrine tumor of pancreas    Encounter for long-term (current) use of high-risk medication    Diabetic foot ulcer     CKD (chronic kidney disease) stage 4, GFR 15-29 ml/min    Pressure injury of buttock, stage 2    HTN (hypertension)    Anemia due to chronic kidney disease    Bilateral lower extremity edema    Cellulitis of right lower extremity    CKD (chronic kidney disease) stage 4, GFR 15-29 ml/min    Acute CHF    Bilateral cellulitis of lower leg    Acute UTI    UTI (urinary tract infection)    Acute UTI (urinary tract infection)    Metabolic acidosis    Cobalamin deficiency    Dependent edema    Gastroesophageal reflux disease    Mild neurocognitive disorder    Pancreatic neoplasm    Congestive heart failure    CHF exacerbation    Acute exacerbation of CHF (congestive heart failure)     Past Medical History:   Diagnosis Date    Acute metabolic encephalopathy 06/24/2022    Anxiety     Arthritis     Benign essential hypertension 08/20/2014    Bleeding disorder     Depression     Diabetes     Diabetes mellitus, type 2     Disc degeneration, lumbar     Headache, tension-type     Hyperlipidemia     Hypothyroidism     Neuropathy     Osteoporosis 09/09/2015    Pancreatic mass     Sept 2023, on Monthly Octreotide Injection    Peripheral neuropathy     Rotator cuff tear, left     Scoliosis     Shoulder pain     LEFT, TORN ROTATOR CUFF S/P FALL    Spinal stenosis      Past Surgical History:   Procedure Laterality Date    APPENDECTOMY      BILATERAL BREAST REDUCTION Bilateral 08/2015    CATARACT EXTRACTION  03/2015    CHOLECYSTECTOMY WITH INTRAOPERATIVE CHOLANGIOGRAM N/A 3/27/2022    Procedure: CHOLECYSTECTOMY LAPAROSCOPIC INTRAOPERATIVE CHOLANGIOGRAM;  Surgeon: Aiyana Hill MD;  Location: [facility] MAIN OR;  Service: General;  Laterality: N/A;    COLONOSCOPY  06/05/2015    WNL    ERCP N/A 2/25/2022    Procedure: ENDOSCOPIC RETROGRADE CHOLANGIOPANCREATOGRAPHY with sphincterotomy and balloon sweep;  Surgeon: Chilo Wilhelm MD;  Location: [facility] ENDOSCOPY;  Service: Gastroenterology;  Laterality: N/A;  PRE/POST - CBD stones     ERCP N/A 3/28/2022    Procedure: ENDOSCOPIC RETROGRADE CHOLANGIOPANCREATOGRAPHY WITH SPHINCTEROTOMY AND BALLOON SWEEP;  Surgeon: Chilo Wilhelm MD;  Location: [facility] ENDOSCOPY;  Service: Gastroenterology;  Laterality: N/A;  PRE: COMMON DUCT STONE  POST: COMMON DUCT STONE    KYPHOPLASTY      REDUCTION MAMMAPLASTY      TONSILLECTOMY        General Information       Row Name 01/16/25 1229          OT Time and Intention    Subjective Information complains of;pain  BLEs  -ES     Document Type evaluation  -ES     Mode of Treatment individual therapy;occupational therapy  -ES     Patient Effort good  -ES       Row Name 01/16/25 1229          General Information    Patient Profile Reviewed yes  -ES     Prior Level of Function --  Pt reports she is independent with ADLs with exception to showering, requires assist to wash hair and back. Squat pivot transfer to wheelchair, does not perform ambulation at baseline. Pt reports she does not sleep in a bed. No home O2 use. [facility] resident.  -ES     Existing Precautions/Restrictions fall  -ES       Row Name 01/16/25 1229          Living Environment    People in Home facility resident  assisted living facility  -ES       Row Name 01/16/25 1229          Cognition    Orientation Status (Cognition) oriented x 3  patient pleasant and cooperative, agreeable to therapy evaluation and participation.  -ES               User Key  (r) = Recorded By, (t) = Taken By, (c) = Cosigned By      Initials Name Provider Type    ES Zee Fermin, OTR/L, CSRS Occupational Therapist                     Mobility/ADL's       Row Name 01/16/25 1235          Bed Mobility    Bed Mobility supine-sit  -ES     Supine-Sit [place] (Bed Mobility) standby assist  -ES     Comment, (Bed Mobility) extra time required with bed mobility  -ES       Row Name 01/16/25 1235          Transfers    Transfers bed-chair transfer  -ES       Row Name 01/16/25 1235          Bed-Chair Transfer    Bed-Chair [place]  (Transfers) standby assist  -ES     Assistive Device (Bed-Chair Transfers) other (see comments)  no AD  -ES     Comment, (Bed-Chair Transfer) patient performs squat pivot transfer from edge of bed to bed side recliner, SBA without use of AD.  -ES       Row Name 01/16/25 1235          Functional Mobility    Functional Mobility- Ind. Level not appropriate to assess  -ES     Functional Mobility- Comment patient does not ambulate at baseline  -ES       Row Name 01/16/25 1235          Activities of Daily Living    BADL Assessment/Intervention lower body dressing  -ES       Row Name 01/16/25 1235          Lower Body Dressing Assessment/Training    [place] Level (Lower Body Dressing) lower body dressing skills;don;shoes/slippers;minimum assist (75% patient effort)  -ES     Position (Lower Body Dressing) edge of bed sitting  -ES     Comment, (Lower Body Dressing) requires assist with LB dressing secondary to reports of increased LE pain  -ES               User Key  (r) = Recorded By, (t) = Taken By, (c) = Cosigned By      Initials Name Provider Type    ES Zee Fermin, TOMÁSR/L, CSRS Occupational Therapist                   Obj/Interventions       Row Name 01/16/25 1237          Sensory Assessment (Somatosensory)    Sensory Assessment (Somatosensory) sensation intact  -ES       Row Name 01/16/25 1237          Vision Assessment/Intervention    Visual Impairment/Limitations WFL  -ES       Row Name 01/16/25 1237          Range of Motion Comprehensive    General Range of Motion bilateral upper extremity ROM WNL  -ES       Row Name 01/16/25 1237          Strength Comprehensive (MMT)    Comment, General Manual Muscle Testing (MMT) Assessment BUEs 4/5  -ES       Row Name 01/16/25 1237          Motor Skills    Motor Skills functional endurance  -ES     Functional Endurance fair  -ES       Row Name 01/16/25 1237          Balance    Balance Assessment sitting dynamic balance;standing dynamic balance  -ES     Dynamic Sitting  Balance supervision  -ES     Position, Sitting Balance unsupported;sitting edge of bed  -ES     Dynamic Standing Balance supervision  -ES     Position/Device Used, Standing Balance unsupported  -ES     Comment, Balance no losses of balance noted sitting edge of bed or with squat pivot transfer from edge of bed to chair  -ES               User Key  (r) = Recorded By, (t) = Taken By, (c) = Cosigned By      Initials Name Provider Type    ES Zee Fermin, OTR/L, CSRS Occupational Therapist                   Goals/Plan    No documentation.                  Clinical Impression       Row Name 01/16/25 1238          Pain Assessment    Pre/Posttreatment Pain Comment patient with reports of BLE pain, does not assign numerical rating to pain. RN aware.  -ES       Row Name 01/16/25 1238          Plan of Care Review    Plan of Care Reviewed With patient  -ES     Progress no change  -ES     Outcome Evaluation Patient is 84 year old female admitted to [facility] on 1/14/2025 with reports of worsening erythema and drainage around RLE wound despite having taken antibiotics on outpatient basis x5 days. PMH significant for type 2 DM, CKD stage 4, BLE lymphedema. Chronic RLE wound. At baseline, patient is [facility] resident and reports she is independent with ADL engagement and is w/c level, completing squat pivot transfers at baseline. Today, patient presents at or near baseline functional status during evaluation. Patient completes bed mobility with SBA, and is able to complete squat pivot transfer from edge of bed to bed side recliner SBA without use of AD. Patient presents with no deficits that require the skills of acute care OT. Recommend return to [facility] when medically stable for discharge.  -ES       Row Name 01/16/25 1238          Therapy Assessment/Plan (OT)    Criteria for Skilled Therapeutic Interventions Met (OT) no problems identified which require skilled intervention  -ES     Therapy Frequency (OT) evaluation  only  -ES       Row Name 01/16/25 1238          Therapy Plan Review/Discharge Plan (OT)    Anticipated Discharge Disposition (OT) assisted living  -ES       Row Name 01/16/25 1238          Positioning and Restraints    Pre-Treatment Position in bed  -ES     Post Treatment Position chair  -ES     In Chair reclined;call light within reach;encouraged to call for assist;exit alarm on  -ES               User Key  (r) = Recorded By, (t) = Taken By, (c) = Cosigned By      Initials Name Provider Type    Zee Henson OTR/L, HERSON Occupational Therapist                   Outcome Measures       Row Name 01/16/25 1243          How much help from another is currently needed...    Putting on and taking off regular lower body clothing? 4  -ES     Bathing (including washing, rinsing, and drying) 4  -ES     Toileting (which includes using toilet bed pan or urinal) 4  -ES     Putting on and taking off regular upper body clothing 4  -ES     Taking care of personal grooming (such as brushing teeth) 4  -ES     Eating meals 4  -ES     AM-PAC 6 Clicks Score (OT) 24  -ES       Row Name 01/16/25 1243          Functional Assessment    Outcome Measure Options AM-PAC 6 Clicks Daily Activity (OT)  -ES               User Key  (r) = Recorded By, (t) = Taken By, (c) = Cosigned By      Initials Name Provider Type    Zee Henson OTR/L, HERSON Occupational Therapist                    Occupational Therapy Education        No education to display                  OT Recommendation and Plan  Therapy Frequency (OT): evaluation only  Plan of Care Review  Plan of Care Reviewed With: patient  Progress: no change  Outcome Evaluation: Patient is 84 year old female admitted to [facility] on 1/14/2025 with reports of worsening erythema and drainage around RLE wound despite having taken antibiotics on outpatient basis x5 days. PMH significant for type 2 DM, CKD stage 4, BLE lymphedema. Chronic RLE wound. At baseline, patient is assisted  resident and reports she is independent with ADL engagement and is w/c level, completing squat pivot transfers at baseline. Today, patient presents at or near baseline functional status during evaluation. Patient completes bed mobility with SBA, and is able to complete squat pivot transfer from edge of bed to bed side recliner SBA without use of AD. Patient presents with no deficits that require the skills of acute care OT. Recommend return to [facility] when medically stable for discharge.     Time Calculation:   Evaluation Complexity (OT)  Review Occupational Profile/Medical/Therapy History Complexity: expanded/moderate complexity  Assessment, Occupational Performance/Identification of Deficit Complexity: 3-5 performance deficits  Clinical Decision Making Complexity (OT): detailed assessment/moderate complexity  Overall Complexity of Evaluation (OT): moderate complexity     Time Calculation- OT       Row Name 01/16/25 1244             Time Calculation- OT    OT Start Time 0930  -ES      OT Stop Time 0951  -ES      OT Time Calculation (min) 21 min  -ES      Total Timed Code Minutes- OT 11 minute(s)  -ES      OT Received On 01/16/25  -ES         Timed Charges    59136 - OT Therapeutic Activity Minutes 11  -ES         Untimed Charges    OT Eval/Re-eval Minutes 10  -ES         Total Minutes    Timed Charges Total Minutes 11  -ES      Untimed Charges Total Minutes 10  -ES       Total Minutes 21  -ES                User Key  (r) = Recorded By, (t) = Taken By, (c) = Cosigned By      Initials Name Provider Type    ES Zee Fermin, OTR/L, CSRS Occupational Therapist                  Therapy Charges for Today       Code Description Service Date Service Provider Modifiers Qty    80257456238 HC OT THERAPEUTIC ACT EA 15 MIN 1/16/2025 Zee Fermin, OTR/L, CSRS GO 1    87651589876 HC OT EVAL MOD COMPLEXITY 2 1/16/2025 Zee Fermin, OTR/L, CSRS GO 1                 Zee Zander, OTR/L, CSRS  1/16/2025

## 2025-01-16 NOTE — PLAN OF CARE
Goal Outcome Evaluation:  Plan of Care Reviewed With: patient        Progress: no change  Outcome Evaluation: Patient is 84 year old female admitted to [facility] on 1/14/2025 with reports of worsening erythema and drainage around RLE wound despite having taken antibiotics on outpatient basis x5 days. PMH significant for type 2 DM, CKD stage 4, BLE lymphedema. Chronic RLE wound. At baseline, patient is [facility] resident and reports she is independent with ADL engagement and is w/c level, completing squat pivot transfers at baseline. Today, patient presents at or near baseline functional status during evaluation. Patient completes bed mobility with SBA, and is able to complete squat pivot transfer from edge of bed to bed side recliner SBA without use of AD. Patient presents with no deficits that require the skills of acute care OT. Recommend return to [facility] when medically stable for discharge.    Anticipated Discharge Disposition (OT): assisted living

## 2025-01-16 NOTE — PROGRESS NOTES
[facility]: Vancomycin Level Monitoring Note    Halie Guidry is a 84 y.o. female who is on day 2 of pharmacy to dose vancomycin for Skin and Soft Tissue (RLE wound).    Estimated Creatinine Clearance: 19 mL/min (A) (by C-G formula based on SCr of 2.61 mg/dL (H)).    Current Vanc Dose: intermittent dosing  Results from last 7 days   Lab Units 01/16/25  0606 01/15/25  0556   VANCOMYCIN RM mcg/mL 18.60 20.50     Patient with CKD4, baseline SCR ~2.2-2.4; renal function elevated above normal. Vanc AM random level resulted appropriate as above (~13.5 hr level). WBC count trending downward.   Will schedule vancomycin 750 mg IV q24h to begin this afternoon at 1500.   Predicted AUC at current dose: 573 mg/L.hr  Next Level Date and Time: Vanc Trough on 1/17 @ 1400, prior to 4th overall dose. Daily BMP on order. Will monitor renal fx closely.    Pharmacy is continuing to monitor and will adjust as needed.    Brock Burleson, [facility]  Clinical Pharmacist

## 2025-01-16 NOTE — PROGRESS NOTES
Name: Halie Guidry ADMIT: 2025   : 1940  PCP: Napoleon Mead MD    MRN: [id number] LOS: 0 days   AGE/SEX: 84 y.o. female  ROOM: [facility]     Subjective   Subjective   No acute events. Patient overall feels better. Denies new complaints. No family at bedside.     Objective   Objective   Vital Signs  Temp:  [97.7 °F (36.5 °C)-99 °F (37.2 °C)] 97.9 °F (36.6 °C)  Heart Rate:  [71-82] 74  Resp:  [16-18] 16  BP: (169-191)/(58-87) 185/63  SpO2:  [93 %-97 %] 97 %  on   ;   Device (Oxygen Therapy): room air  Body mass index is 37.24 kg/m².  Physical Exam  Vitals and nursing note reviewed.   HENT:      Head: Normocephalic and atraumatic.      Nose: Nose normal.      Mouth/Throat:      Mouth: Mucous membranes are moist.      Pharynx: Oropharynx is clear.   Eyes:      Conjunctiva/sclera: Conjunctivae normal.      Pupils: Pupils are equal, round, and reactive to light.   Cardiovascular:      Rate and Rhythm: Normal rate and regular rhythm.      Pulses: Normal pulses.   Pulmonary:      Effort: Pulmonary effort is normal.   Abdominal:      General: Bowel sounds are normal. There is no distension.      Palpations: Abdomen is soft.      Tenderness: There is no abdominal tenderness.   Musculoskeletal:         General: Swelling and tenderness present.      Cervical back: Neck supple.   Skin:     General: Skin is warm and dry.      Capillary Refill: Capillary refill takes less than 2 seconds.      Findings: Erythema (BLE) present.   Neurological:      General: No focal deficit present.      Mental Status: She is alert and oriented to person, place, and time.   Psychiatric:         Mood and Affect: Mood normal.         Behavior: Behavior normal.       Results Review     I reviewed the patient's new clinical results.  Results from last 7 days   Lab Units 25  0606 01/15/25  0556 25   WBC 10*3/mm3 8.66 11.86* 12.99*   HEMOGLOBIN g/dL 8.3* 9.2* 9.4*   PLATELETS 10*3/mm3 284 327 303     Results from  last 7 days   Lab Units 01/16/25  0606 01/15/25  0556 01/14/25 2002   SODIUM mmol/L 144 140 140   POTASSIUM mmol/L 3.8 3.8 3.5   CHLORIDE mmol/L 109* 109* 112*   CO2 mmol/L 19.6* 18.5* 15.7*   BUN mg/dL 45* 44* 41*   CREATININE mg/dL 2.61* 2.43* 2.48*   GLUCOSE mg/dL 127* 160* 63*   EGFR mL/min/1.73 17.6* 19.2* 18.7*     Results from last 7 days   Lab Units 01/15/25  0556 01/14/25 2002   ALBUMIN g/dL 3.3* 3.2*   BILIRUBIN mg/dL 0.2 <0.2   ALK PHOS U/L 138* 129*   AST (SGOT) U/L 10 13   ALT (SGPT) U/L 6 5     Results from last 7 days   Lab Units 01/16/25  0606 01/15/25  0556 01/14/25 2002   CALCIUM mg/dL 8.1* 8.4* 7.7*   ALBUMIN g/dL  --  3.3* 3.2*   MAGNESIUM mg/dL  --   --  1.5*   PHOSPHORUS mg/dL  --   --  4.7*     Results from last 7 days   Lab Units 01/14/25 2002   PROCALCITONIN ng/mL 0.11   LACTATE mmol/L 1.3     Glucose   Date/Time Value Ref Range Status   01/16/2025 0702 120 70 - 130 mg/dL Final   01/15/2025 2133 238 (H) 70 - 130 mg/dL Final   01/15/2025 1608 227 (H) 70 - 130 mg/dL Final   01/15/2025 1215 167 (H) 70 - 130 mg/dL Final   01/15/2025 0019 157 (H) 70 - 130 mg/dL Final   01/14/2025 2232 137 (H) 70 - 130 mg/dL Final   01/14/2025 2140 90 70 - 130 mg/dL Final       No radiology results for the last day    I have personally reviewed all medications:  Scheduled Medications  ammonium lactate, 1 Application, Topical, BID  atorvastatin, 80 mg, Oral, Nightly  bisoprolol, 5 mg, Oral, Daily  cefTAZidime, 2,000 mg, Intravenous, Q24H  enoxaparin, 30 mg, Subcutaneous, Nightly  fluticasone, 2 spray, Nasal, Daily  hydrocortisone-bacitracin-zinc oxide-nystatin, 1 Application, Topical, Q12H  insulin lispro, 2-7 Units, Subcutaneous, 4x Daily AC & at Bedtime  levothyroxine, 137 mcg, Oral, Q AM  pantoprazole, 40 mg, Oral, Q AM  pregabalin, 75 mg, Oral, BID  terazosin, 1 mg, Oral, Nightly  torsemide, 40 mg, Oral, BID  vancomycin, 750 mg, Intravenous, Q24H    Infusions  Pharmacy to Dose enoxaparin (LOVENOX),    Pharmacy to dose vancomycin,     Diet  Diet: Cardiac, Diabetic; Healthy Heart (2-3 Na+); Consistent Carbohydrate; Fluid Consistency: Thin (IDDSI 0)    I have personally reviewed:  [x]  Laboratory   [x]  Microbiology   []  Radiology   [x]  EKG/Telemetry  []  Cardiology/Vascular   []  Pathology    []  Records    Assessment/Plan     Active Hospital Problems    Diagnosis  POA    **Cellulitis of right lower extremity [L03.115]  Yes    CKD (chronic kidney disease) stage 4, GFR 15-29 ml/min [N18.4]  Yes    Pancreatic neoplasm [D49.0]  Yes    Gastroesophageal reflux disease [K21.9]  Yes    Type 2 diabetes mellitus with hyperglycemia [E11.65]  Yes    Severe hypothyroidism [E03.9]  Yes    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   RLE Cellulitis  - has chronic RLE wound, diabetic patient  - continue vancomycin and fortaz, blood and wound cx's NGTD  - continue pain control and local wound care     Type 2 DM  - complications include neuropathy and nephropathy  - BG acceptable  - continue coverage with ssi/hypoglycemia protocol low dose     CKD Stage 4  - baseline serum creatinine is 2.3-2.4  - monitor with above treatments  - continue torsemide     Hypothyroidism  - continue levothyroxine      Lovenox 30 mg SC daily for DVT prophylaxis.  Full code.  Discussed with patient, nursing staff, and care team on multidisciplinary rounds.  Anticipate discharge to SNU facility in 2-3 days.  Expected Discharge Date: 1/17/2025; Expected Discharge Time:       Lewis Sheppard MD  [facility]  01/16/25  11:58 EST    Portions of this text have been copied and I have reviewed them. They are accurate as of 1/16/2025

## 2025-01-16 NOTE — PLAN OF CARE
Goal Outcome Evaluation:  Plan of Care Reviewed With: patient        Progress: no change  Pt is a 84 y.o. female with h/o DMII, CKD4, chronic RLE wound and BLE lymphedema who was admitted to [facility] on 1/14/2025 with worsening erythema and drainage around RLE wound. Pt lives in an [facility] where she is (I) for bed mobility and squat pivot tsfs to a WC. Pt states staff assist w/ ADLs and she sleeps in a recliner. Today, pt required Min/ModA for 3 STS without AD and subsequent 3 attempts for squat pivot to bed. Pt wanting to practice return squat pivot to bed, then back to chair as she wanted to remain sitting up. Pt was mildly unsteady w/ standing, but had no overt LOB or buckling. Pt was unable to take steps/pivot onto bed and relates this to feeling overwhelmed with the amount of lines she has. Pt declined this PT further assist w/ completing tsf to bed as she did not want to risk remaining in bed. Pt may benefit from skilled PT services acutely to address their impaired strength, balance and endurance, as well as, improve their level of independence prior to discharge. SBAR w/ RN. Rec SNF upon DC.         Anticipated Discharge Disposition (PT): skilled nursing facility

## 2025-01-16 NOTE — THERAPY EVALUATION
Patient Name: Halie Guidry  : 1940    MRN: [id number]                              Today's Date: 2025       Admit Date: 2025    Visit Dx:     ICD-10-CM ICD-9-CM   1. Cellulitis of right leg  L03.115 682.6   2. Ulcer of right lower extremity, unspecified ulcer stage  L97.919 707.10   3. Hypoglycemia  E16.2 251.2   4. Bilateral lower extremity edema  R60.0 782.3   5. Decreased activities of daily living (ADL)  Z78.9 V49.89     Patient Active Problem List   Diagnosis    Compression fracture    Lumbar degenerative disc disease    Type 2 diabetes mellitus with hyperglycemia, with long-term current use of insulin    Hyperlipidemia    Benign essential hypertension    Primary hypothyroidism    Neuropathy    Osteoporosis    Proteinuria    Tobacco abuse    Generalized weakness    Spinal stenosis    Scoliosis    Arthritis    VBI (vertebrobasilar insufficiency)    Orthostatic hypotension    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    Severe hypothyroidism    Noncompliance with medication regimen    Vitamin D deficiency disease    Tremor    Seizure    Thoracic degenerative disc disease    Acute kidney injury (VIPUL) with acute tubular necrosis (ATN)    Hyperglycemia    Type 2 diabetes mellitus with hyperglycemia    Lower abdominal pain    Transaminitis    Emphysematous cystitis    Choledocholithiasis    Hypokalemia    Hypoxia    Generalized abdominal pain    History of Clostridium difficile infection    History of ERCP    Hypomagnesemia    Anxiety disorder    Neuropathic pain    Intertrigo    Weakness of both lower extremities    Accelerated hypertension    Acute cystitis without hematuria    Left lower lobe pneumonia    Cellulitis and abscess of left lower extremity    Benign hypertension with CKD (chronic kidney disease) stage IV    Peripheral edema    Primary malignant neuroendocrine tumor of pancreas    Encounter for long-term (current) use of high-risk medication    Diabetic foot ulcer     CKD (chronic kidney disease) stage 4, GFR 15-29 ml/min    Pressure injury of buttock, stage 2    HTN (hypertension)    Anemia due to chronic kidney disease    Bilateral lower extremity edema    Cellulitis of right lower extremity    CKD (chronic kidney disease) stage 4, GFR 15-29 ml/min    Acute CHF    Bilateral cellulitis of lower leg    Acute UTI    UTI (urinary tract infection)    Acute UTI (urinary tract infection)    Metabolic acidosis    Cobalamin deficiency    Dependent edema    Gastroesophageal reflux disease    Mild neurocognitive disorder    Pancreatic neoplasm    Congestive heart failure    CHF exacerbation    Acute exacerbation of CHF (congestive heart failure)     Past Medical History:   Diagnosis Date    Acute metabolic encephalopathy 06/24/2022    Anxiety     Arthritis     Benign essential hypertension 08/20/2014    Bleeding disorder     Depression     Diabetes     Diabetes mellitus, type 2     Disc degeneration, lumbar     Headache, tension-type     Hyperlipidemia     Hypothyroidism     Neuropathy     Osteoporosis 09/09/2015    Pancreatic mass     Sept 2023, on Monthly Octreotide Injection    Peripheral neuropathy     Rotator cuff tear, left     Scoliosis     Shoulder pain     LEFT, TORN ROTATOR CUFF S/P FALL    Spinal stenosis      Past Surgical History:   Procedure Laterality Date    APPENDECTOMY      BILATERAL BREAST REDUCTION Bilateral 08/2015    CATARACT EXTRACTION  03/2015    CHOLECYSTECTOMY WITH INTRAOPERATIVE CHOLANGIOGRAM N/A 3/27/2022    Procedure: CHOLECYSTECTOMY LAPAROSCOPIC INTRAOPERATIVE CHOLANGIOGRAM;  Surgeon: Aiyana Hill MD;  Location: [facility] MAIN OR;  Service: General;  Laterality: N/A;    COLONOSCOPY  06/05/2015    WNL    ERCP N/A 2/25/2022    Procedure: ENDOSCOPIC RETROGRADE CHOLANGIOPANCREATOGRAPHY with sphincterotomy and balloon sweep;  Surgeon: Chilo Wilhelm MD;  Location: [facility] ENDOSCOPY;  Service: Gastroenterology;  Laterality: N/A;  PRE/POST - CBD stones     ERCP N/A 3/28/2022    Procedure: ENDOSCOPIC RETROGRADE CHOLANGIOPANCREATOGRAPHY WITH SPHINCTEROTOMY AND BALLOON SWEEP;  Surgeon: Chilo Wilhelm MD;  Location: [facility] ENDOSCOPY;  Service: Gastroenterology;  Laterality: N/A;  PRE: COMMON DUCT STONE  POST: COMMON DUCT STONE    KYPHOPLASTY      REDUCTION MAMMAPLASTY      TONSILLECTOMY        General Information       Row Name 01/16/25 1444          Physical Therapy Time and Intention    Document Type evaluation  -MG     Mode of Treatment individual therapy;physical therapy  -MG       Row Name 01/16/25 1444          General Information    Patient Profile Reviewed yes  -MG     Prior Level of Function --  Pt states (I) w/ ADLs, but does require assist w/ washing her back and hair. Independent squat pivot transfer to . Does not ambulate. Sleeps in a recliner. No O2.  -MG     Existing Precautions/Restrictions fall  -MG     Barriers to Rehab none identified  -MG       Row Name 01/16/25 1444          Living Environment    People in Home facility resident  retirement  -MG       Row Name 01/16/25 1444          Home Main Entrance    Number of Stairs, Main Entrance none  -MG       Row Name 01/16/25 1444          Stairs Within Home, Primary    Number of Stairs, Within Home, Primary none  -MG       Row Name 01/16/25 1444          Cognition    Orientation Status (Cognition) oriented x 3  -MG       Row Name 01/16/25 1444          Safety Issues/Impairments Affecting Functional Mobility    Safety Issues Affecting Function (Mobility) insight into deficits/self-awareness;problem-solving;safety precaution awareness;sequencing abilities  -MG     Impairments Affecting Function (Mobility) balance;postural/trunk control;endurance/activity tolerance;coordination;strength  -MG     Comment, Safety Issues/Impairments (Mobility) Gait belt and non-skid socks donned.  -MG               User Key  (r) = Recorded By, (t) = Taken By, (c) = Cosigned By      Initials Name Provider Type    MG Jose Elias  "Melani, PT Physical Therapist                   Mobility       Row Name 01/16/25 1448          Bed-Chair Transfer    Comment, (Bed-Chair Transfer) Attempted x3 for practice of squat pivot, but unable to take steps towards the bed and returned to sitting. Pt also voicing frustration over lines, stating they were \"too in the way\", despite this PT re-organizing them to be out of the way.  -MG       Row Name 01/16/25 1448          Sit-Stand Transfer    Sit-Stand [place] (Transfers) minimum assist (75% patient effort);moderate assist (50% patient effort);verbal cues  -MG     Comment, (Sit-Stand Transfer) x3 from chair. No AD. Very flexed fwd at trunk, which pt reports is her baseline.  -MG               User Key  (r) = Recorded By, (t) = Taken By, (c) = Cosigned By      Initials Name Provider Type    MG Melani Moctezuma, PT Physical Therapist                   Obj/Interventions       Row Name 01/16/25 1818          Range of Motion Comprehensive    General Range of Motion bilateral lower extremity ROM WFL  -MG       Row Name 01/16/25 1818          Strength Comprehensive (MMT)    General Manual Muscle Testing (MMT) Assessment other (see comments)  -MG     Comment, General Manual Muscle Testing (MMT) Assessment Generalized weakness. BLE grossly 4/5.  -MG       Row Name 01/16/25 1818          Balance    Comment, Balance Mildy unsteady w/ standing balance, but no overt LOB or buckling. Good sitting balance while seated on edge of chair.  -MG               User Key  (r) = Recorded By, (t) = Taken By, (c) = Cosigned By      Initials Name Provider Type    MG Melani Moctezuma, PT Physical Therapist                   Goals/Plan       Row Name 01/16/25 1820          Bed Mobility Goal 1 (PT)    Activity/Assistive Device (Bed Mobility Goal 1, PT) bed mobility activities, all  -MG     [place] Level/Cues Needed (Bed Mobility Goal 1, PT) standby assist  -MG     Time Frame (Bed Mobility Goal 1, PT) 1 week  -MG       Row Name " 01/16/25 1820          Transfer Goal 1 (PT)    Activity/Assistive Device (Transfer Goal 1, PT) transfers, all  -MG     [place] Level/Cues Needed (Transfer Goal 1, PT) standby assist  -MG     Time Frame (Transfer Goal 1, PT) 1 week  -MG       Row Name 01/16/25 1820          Therapy Assessment/Plan (PT)    Planned Therapy Interventions (PT) balance training;bed mobility training;gait training;home exercise program;patient/family education;stretching;strengthening;neuromuscular re-education;ROM (range of motion);postural re-education;transfer training  -MG               User Key  (r) = Recorded By, (t) = Taken By, (c) = Cosigned By      Initials Name Provider Type    MG Melani Moctezuma, PT Physical Therapist                   Clinical Impression       Row Name 01/16/25 1819          Pain    Pain Location extremity  -MG     Pain Side/Orientation lower;bilateral  -MG     Pain Management Interventions nursing notified;positioning techniques utilized  -MG     Response to Pain Interventions activity participation with tolerable pain  -MG     Pre/Posttreatment Pain Comment Does not rate.  -MG       Row Name 01/16/25 1819          Plan of Care Review    Plan of Care Reviewed With patient  -MG     Progress no change  -MG     Outcome Evaluation Pt is a 84 y.o. female with h/o DMII, CKD4, chronic RLE wound and BLE lymphedema who was admitted to [facility] on 1/14/2025 with worsening erythema and drainage around RLE wound. Pt lives in an [facility] where she is (I) for bed mobility and squat pivot tsfs to a . Pt states staff assist w/ ADLs and she sleeps in a recliner. Today, pt required Min/ModA for 3 STS without AD and subsequent 3 attempts for squat pivot to bed. Pt wanting to practice return squat pivot to bed, then back to chair as she wanted to remain sitting up. Pt was mildly unsteady w/ standing, but had no overt LOB or buckling. Pt was unable to take steps/pivot onto bed and relates this to feeling overwhelmed with the amount of  lines she has. Pt declined this PT further assist w/ completing tsf to bed as she did not want to risk remaining in bed. Pt may benefit from skilled PT services acutely to address their impaired strength, balance and endurance, as well as, improve their level of independence prior to discharge. DASHAWN w/ RN. Rec SNF upon DC.  -MG       Row Name 01/16/25 1819          Therapy Assessment/Plan (PT)    Rehab Potential (PT) fair  -MG     Criteria for Skilled Interventions Met (PT) yes  -MG     Therapy Frequency (PT) 5 times/wk  -MG       Row Name 01/16/25 1819          Positioning and Restraints    Pre-Treatment Position sitting in chair/recliner  -MG     Post Treatment Position chair  -MG     In Chair notified nsg;reclined;encouraged to call for assist;call light within reach;exit alarm on;legs elevated;heels elevated  -MG               User Key  (r) = Recorded By, (t) = Taken By, (c) = Cosigned By      Initials Name Provider Type    Melani Barbosa, PT Physical Therapist                   Outcome Measures       Row Name 01/16/25 1820          How much help from another person do you currently need...    Turning from your back to your side while in flat bed without using bedrails? 2  -MG     Moving from lying on back to sitting on the side of a flat bed without bedrails? 2  -MG     Moving to and from a bed to a chair (including a wheelchair)? 2  -MG     Standing up from a chair using your arms (e.g., wheelchair, bedside chair)? 2  -MG     Climbing 3-5 steps with a railing? 1  -MG     To walk in hospital room? 1  -MG     AM-PAC 6 Clicks Score (PT) 10  -MG       Row Name 01/16/25 1243          Functional Assessment    Outcome Measure Options AM-PAC 6 Clicks Daily Activity (OT)  -ES               User Key  (r) = Recorded By, (t) = Taken By, (c) = Cosigned By      Initials Name Provider Type    Melani Barbosa, PT Physical Therapist    Zee Henson, OTR/L, CSRS Occupational Therapist                                  Physical Therapy Education       Title: PT OT SLP Therapies (In Progress)       Topic: Physical Therapy (In Progress)       Point: Mobility training (Done)       Learning Progress Summary            Patient Acceptance, E, VU,NR by  at 1/16/2025 1821                      Point: Home exercise program (Not Started)       Learner Progress:  Not documented in this visit.              Point: Body mechanics (Done)       Learning Progress Summary            Patient Acceptance, E, VU,NR by  at 1/16/2025 1821                      Point: Precautions (Done)       Learning Progress Summary            Patient Acceptance, E, VU,NR by  at 1/16/2025 1821                                      User Key       Initials Effective Dates Name Provider Type Discipline     05/24/22 -  Melani Moctezuma, PT Physical Therapist PT                  PT Recommendation and Plan  Planned Therapy Interventions (PT): balance training, bed mobility training, gait training, home exercise program, patient/family education, stretching, strengthening, neuromuscular re-education, ROM (range of motion), postural re-education, transfer training  Progress: no change  Outcome Evaluation: Pt is a 84 y.o. female with h/o DMII, CKD4, chronic RLE wound and BLE lymphedema who was admitted to [facility] on 1/14/2025 with worsening erythema and drainage around RLE wound. Pt lives in an nursing home where she is (I) for bed mobility and squat pivot tsfs to a . Pt states staff assist w/ ADLs and she sleeps in a recliner. Today, pt required Min/ModA for 3 STS without AD and subsequent 3 attempts for squat pivot to bed. Pt wanting to practice return squat pivot to bed, then back to chair as she wanted to remain sitting up. Pt was mildly unsteady w/ standing, but had no overt LOB or buckling. Pt was unable to take steps/pivot onto bed and relates this to feeling overwhelmed with the amount of lines she has. Pt declined this PT further assist w/ completing tsf to bed as she did not  want to risk remaining in bed. Pt may benefit from skilled PT services acutely to address their impaired strength, balance and endurance, as well as, improve their level of independence prior to discharge. DASHAWN w/ RN. Rec SNF upon DC.     Time Calculation:         PT Charges       Row Name 01/16/25 1821             Time Calculation    Start Time 1326  -MG      Stop Time 1350  -MG      Time Calculation (min) 24 min  -MG      PT Received On 01/16/25  -MG      PT - Next Appointment 01/17/25  -MG      PT Goal Re-Cert Due Date 01/30/25  -MG         Time Calculation- PT    Total Timed Code Minutes- PT 12 minute(s)  -MG                User Key  (r) = Recorded By, (t) = Taken By, (c) = Cosigned By      Initials Name Provider Type    MG Melani Moctezuma, PT Physical Therapist                  Therapy Charges for Today       Code Description Service Date Service Provider Modifiers Qty    99242733925 HC PT EVAL MOD COMPLEXITY 2 1/16/2025 Melani Moctezuma, PT GP 1    14647814949 HC PT THERAPEUTIC ACT EA 15 MIN 1/16/2025 Melani Moctezuma, PT GP 1            PT G-Codes  Outcome Measure Options: AM-PAC 6 Clicks Daily Activity (OT)  AM-PAC 6 Clicks Score (PT): 10  AM-PAC 6 Clicks Score (OT): 24  PT Discharge Summary  Anticipated Discharge Disposition (PT): skilled nursing facility    Melani Moctezuma PT  1/16/2025

## 2025-01-17 LAB
ANION GAP SERPL CALCULATED.3IONS-SCNC: 15.6 MMOL/L (ref 5–15)
BASOPHILS # BLD AUTO: 0.09 10*3/MM3 (ref 0–0.2)
BASOPHILS NFR BLD AUTO: 0.9 % (ref 0–1.5)
BUN SERPL-MCNC: 52 MG/DL (ref 8–23)
BUN/CREAT SERPL: 19.9 (ref 7–25)
CALCIUM SPEC-SCNC: 8 MG/DL (ref 8.6–10.5)
CHLORIDE SERPL-SCNC: 106 MMOL/L (ref 98–107)
CO2 SERPL-SCNC: 20.4 MMOL/L (ref 22–29)
CREAT SERPL-MCNC: 2.61 MG/DL (ref 0.57–1)
DEPRECATED RDW RBC AUTO: 48.6 FL (ref 37–54)
EGFRCR SERPLBLD CKD-EPI 2021: 17.6 ML/MIN/1.73
EOSINOPHIL # BLD AUTO: 0.71 10*3/MM3 (ref 0–0.4)
EOSINOPHIL NFR BLD AUTO: 7.1 % (ref 0.3–6.2)
ERYTHROCYTE [DISTWIDTH] IN BLOOD BY AUTOMATED COUNT: 15.3 % (ref 12.3–15.4)
GLUCOSE BLDC GLUCOMTR-MCNC: 242 MG/DL (ref 70–130)
GLUCOSE BLDC GLUCOMTR-MCNC: 264 MG/DL (ref 70–130)
GLUCOSE BLDC GLUCOMTR-MCNC: 291 MG/DL (ref 70–130)
GLUCOSE BLDC GLUCOMTR-MCNC: 303 MG/DL (ref 70–130)
GLUCOSE SERPL-MCNC: 168 MG/DL (ref 65–99)
HCT VFR BLD AUTO: 28.1 % (ref 34–46.6)
HGB BLD-MCNC: 9 G/DL (ref 12–15.9)
IMM GRANULOCYTES # BLD AUTO: 0.05 10*3/MM3 (ref 0–0.05)
IMM GRANULOCYTES NFR BLD AUTO: 0.5 % (ref 0–0.5)
LYMPHOCYTES # BLD AUTO: 1.21 10*3/MM3 (ref 0.7–3.1)
LYMPHOCYTES NFR BLD AUTO: 12.2 % (ref 19.6–45.3)
MCH RBC QN AUTO: 27.9 PG (ref 26.6–33)
MCHC RBC AUTO-ENTMCNC: 32 G/DL (ref 31.5–35.7)
MCV RBC AUTO: 87 FL (ref 79–97)
MONOCYTES # BLD AUTO: 0.75 10*3/MM3 (ref 0.1–0.9)
MONOCYTES NFR BLD AUTO: 7.5 % (ref 5–12)
NEUTROPHILS NFR BLD AUTO: 7.13 10*3/MM3 (ref 1.7–7)
NEUTROPHILS NFR BLD AUTO: 71.8 % (ref 42.7–76)
NRBC BLD AUTO-RTO: 0 /100 WBC (ref 0–0.2)
PLATELET # BLD AUTO: 285 10*3/MM3 (ref 140–450)
PMV BLD AUTO: 9.9 FL (ref 6–12)
POTASSIUM SERPL-SCNC: 3.8 MMOL/L (ref 3.5–5.2)
RBC # BLD AUTO: 3.23 10*6/MM3 (ref 3.77–5.28)
SODIUM SERPL-SCNC: 142 MMOL/L (ref 136–145)
VANCOMYCIN TROUGH SERPL-MCNC: 20.1 MCG/ML (ref 5–20)
WBC NRBC COR # BLD AUTO: 9.94 10*3/MM3 (ref 3.4–10.8)

## 2025-01-17 PROCEDURE — 25010000002 ENOXAPARIN PER 10 MG: Performed by: NURSE PRACTITIONER

## 2025-01-17 PROCEDURE — 85025 COMPLETE CBC W/AUTO DIFF WBC: CPT | Performed by: INTERNAL MEDICINE

## 2025-01-17 PROCEDURE — 82948 REAGENT STRIP/BLOOD GLUCOSE: CPT

## 2025-01-17 PROCEDURE — 25010000002 VANCOMYCIN PER 500 MG: Performed by: INTERNAL MEDICINE

## 2025-01-17 PROCEDURE — 63710000001 INSULIN LISPRO (HUMAN) PER 5 UNITS: Performed by: INTERNAL MEDICINE

## 2025-01-17 PROCEDURE — 80048 BASIC METABOLIC PNL TOTAL CA: CPT | Performed by: INTERNAL MEDICINE

## 2025-01-17 PROCEDURE — 25010000002 LABETALOL 5 MG/ML SOLUTION: Performed by: NURSE PRACTITIONER

## 2025-01-17 PROCEDURE — 63710000001 INSULIN GLARGINE PER 5 UNITS: Performed by: INTERNAL MEDICINE

## 2025-01-17 PROCEDURE — 80202 ASSAY OF VANCOMYCIN: CPT | Performed by: INTERNAL MEDICINE

## 2025-01-17 PROCEDURE — 25010000002 CEFTAZIDIME 2 G RECONSTITUTED SOLUTION 1 EACH VIAL: Performed by: INTERNAL MEDICINE

## 2025-01-17 RX ADMIN — VANCOMYCIN HYDROCHLORIDE 500 MG: 500 INJECTION, POWDER, LYOPHILIZED, FOR SOLUTION INTRAVENOUS at 18:48

## 2025-01-17 RX ADMIN — ENOXAPARIN SODIUM 30 MG: 100 INJECTION SUBCUTANEOUS at 20:02

## 2025-01-17 RX ADMIN — INSULIN GLARGINE 15 UNITS: 100 INJECTION, SOLUTION SUBCUTANEOUS at 13:13

## 2025-01-17 RX ADMIN — PREGABALIN 75 MG: 75 CAPSULE ORAL at 09:30

## 2025-01-17 RX ADMIN — LABETALOL HYDROCHLORIDE 10 MG: 5 INJECTION, SOLUTION INTRAVENOUS at 01:54

## 2025-01-17 RX ADMIN — ZINC OXIDE 1 APPLICATION: 200 OINTMENT TOPICAL at 20:03

## 2025-01-17 RX ADMIN — FLUTICASONE PROPIONATE 2 SPRAY: 50 SPRAY, METERED NASAL at 09:31

## 2025-01-17 RX ADMIN — ACETAMINOPHEN 650 MG: 325 TABLET, FILM COATED ORAL at 23:33

## 2025-01-17 RX ADMIN — ATORVASTATIN CALCIUM 80 MG: 80 TABLET, FILM COATED ORAL at 20:02

## 2025-01-17 RX ADMIN — CEFTAZIDIME 2000 MG: 2 INJECTION, POWDER, FOR SOLUTION INTRAVENOUS at 09:31

## 2025-01-17 RX ADMIN — ACETAMINOPHEN 650 MG: 325 TABLET, FILM COATED ORAL at 02:25

## 2025-01-17 RX ADMIN — BISOPROLOL FUMARATE 5 MG: 5 TABLET ORAL at 09:30

## 2025-01-17 RX ADMIN — ZINC OXIDE 1 APPLICATION: 200 OINTMENT TOPICAL at 09:32

## 2025-01-17 RX ADMIN — Medication 1 APPLICATION: at 09:30

## 2025-01-17 RX ADMIN — ACETAMINOPHEN 650 MG: 325 TABLET, FILM COATED ORAL at 17:57

## 2025-01-17 RX ADMIN — LABETALOL HYDROCHLORIDE 10 MG: 5 INJECTION, SOLUTION INTRAVENOUS at 20:01

## 2025-01-17 RX ADMIN — PREGABALIN 75 MG: 75 CAPSULE ORAL at 20:02

## 2025-01-17 RX ADMIN — TORSEMIDE 40 MG: 20 TABLET ORAL at 20:02

## 2025-01-17 RX ADMIN — INSULIN LISPRO 3 UNITS: 100 INJECTION, SOLUTION INTRAVENOUS; SUBCUTANEOUS at 17:57

## 2025-01-17 RX ADMIN — PANTOPRAZOLE SODIUM 40 MG: 40 TABLET, DELAYED RELEASE ORAL at 05:03

## 2025-01-17 RX ADMIN — INSULIN LISPRO 5 UNITS: 100 INJECTION, SOLUTION INTRAVENOUS; SUBCUTANEOUS at 13:13

## 2025-01-17 RX ADMIN — INSULIN LISPRO 4 UNITS: 100 INJECTION, SOLUTION INTRAVENOUS; SUBCUTANEOUS at 09:30

## 2025-01-17 RX ADMIN — LEVOTHYROXINE SODIUM 137 MCG: 0.03 TABLET ORAL at 05:03

## 2025-01-17 RX ADMIN — ACETAMINOPHEN 650 MG: 325 TABLET, FILM COATED ORAL at 09:45

## 2025-01-17 RX ADMIN — TORSEMIDE 40 MG: 20 TABLET ORAL at 09:30

## 2025-01-17 RX ADMIN — INSULIN LISPRO 4 UNITS: 100 INJECTION, SOLUTION INTRAVENOUS; SUBCUTANEOUS at 22:30

## 2025-01-17 RX ADMIN — Medication 1 APPLICATION: at 20:03

## 2025-01-17 RX ADMIN — TERAZOSIN HYDROCHLORIDE 1 MG: 1 CAPSULE ORAL at 20:02

## 2025-01-17 NOTE — PROGRESS NOTES
"[facility] Clinical Pharmacy Services: Vancomycin Monitoring Note    Halie Guidry is a 84 y.o. female who is on day 3/TBD of pharmacy to dose vancomycin for Skin and Soft Tissue.    Previous Vancomycin Dose:    750 mg IV every  24  hours  Updated Cultures and Sensitivities:   1/16 wound cx rt leg - GPC noted  1/14 BCx x2 - no growth x 2 days       Results from last 7 days   Lab Units 01/17/25  1412 01/16/25  0606 01/15/25  0556   VANCOMYCIN RM mcg/mL  --  18.60 20.50   VANCOMYCIN TR mcg/mL 20.10*  --   --      Vitals/Labs  Ht: 165.1 cm (65\"); Wt: 101 kg (223 lb 12.3 oz)   Temp Readings from Last 1 Encounters:   01/17/25 98.1 °F (36.7 °C) (Oral)     Estimated Creatinine Clearance: 19 mL/min (A) (by C-G formula based on SCr of 2.61 mg/dL (H)).   CKD4    Results from last 7 days   Lab Units 01/17/25  0506 01/16/25  0606 01/15/25  0556   CREATININE mg/dL 2.61* 2.61* 2.43*   WBC 10*3/mm3 9.94 8.66 11.86*     Assessment/Plan    Current Vancomycin Dose: 750 mg IV every  24  hours; provides a predicted  mg/L.hr  have adjusted to 500 mg IV q24h for new AUC of 438  Next Level Date and Time: Vanc Trough on 1/19 at 1400  We will continue to monitor patient changes and renal function     Thank you for involving pharmacy in this patient's care. Please contact pharmacy with any questions or concerns.       Philip Bingham, [facility]  Clinical Pharmacist          "

## 2025-01-17 NOTE — CASE MANAGEMENT/SOCIAL WORK
Discharge Planning Assessment  [facility]     Patient Name: Halie Guidry  MRN: [id number]  Today's Date: 1/17/2025    Admit Date: 1/14/2025    Plan: Acute rehab pending acceptance and precert vs SNF with same.   Discharge Needs Assessment       Row Name 01/17/25 1447       Living Environment    People in Home facility resident    Current Living Arrangements assisted living facility    Duration at Residence [facility]    Potentially Unsafe Housing Conditions none    Primary Care Provided by self;homecare agency    Family Caregiver if Needed child(beatriz), adult    Quality of Family Relationships helpful;involved    Able to Return to Prior Arrangements yes       Resource/Environmental Concerns    Resource/Environmental Concerns none    Home Accessibility Concerns none    Transportation Concerns none       Transition Planning    Patient/Family Anticipates Transition to inpatient rehabilitation facility    Patient/Family Anticipated Services at Transition skilled nursing    Transportation Anticipated family or friend will provide;other (see comments)       Discharge Needs Assessment    Readmission Within the Last 30 Days no previous admission in last 30 days    Equipment Currently Used at Home walker, rolling;shower chair;grab bar;wheelchair    Concerns to be Addressed adjustment to diagnosis/illness    Anticipated Changes Related to Illness inability to care for self    Equipment Needed After Discharge none                   Discharge Plan       Row Name 01/17/25 1446       Plan    Plan Acute rehab pending acceptance and precert vs SNF with same.    Patient/Family in Agreement with Plan yes    Plan Comments Spoke with patient at bedside, introduced self, explained CCP role and verified face sheet and pharmacy information. Pt lives alone at [facility], she manages her medications and is usually IADL's, she uses rwx and has wc if needed. She is current with [facility] and has been to [facility],  [facility], [facility] and [facility]. She would not return to [facility] or [facility]. She is interested in rehab at [facility] but would like to try IRF, referral to [facility] per pt request. Spoke with Prasanth there and he has seen patient and provided information about facility, they are evaluating if patient meets criteria for acute level of rehab. Pt would need precert, per Dr. Sheppard pt likely ready Sunday or Monday, await return call from Prasanth to see if a good candidate. Discussed CHUCK asmita, pt would like [address], [facility], [facility] or [facility], referrals sent for [facility] with [facility] at [facility] other [facility]. Referral to [facility] to follow after rehab stay. CCP will Follow - Savita BREWSTERJanet                  Continued Care and Services - Admitted Since 1/14/2025       Destination       Service Provider Request Status Services Address Phone Fax Patient Preferred    [facility] Considering -- [address] [place] [address] [phone] --              Home Medical Care       Service Provider Request Status Services Address Phone Fax Patient Preferred    [facility]  Selected Home Health Services [address], [address], [facility] [address] [phone] --                  Selected Continued Care - Episodes Includes continued care and service providers with selected services from the active episodes listed below      [facility] Episode start date: 9/10/2024   There are no active outsourced providers for this episode.                 Selected Continued Care - Prior Encounters Includes continued care and service providers with selected services from prior encounters from 10/16/2024 to 1/17/2025      Discharged on 11/11/2024 Admission date: 11/5/2024 - Discharge disposition: Home or Self Care      Home Medical Care       Service  Provider Services Address Phone Fax Patient Preferred    [facility] [address], [address], [facility] [address] [phone] --                      Discharged on 10/19/2024 Admission date: 10/10/2024 - Discharge disposition: Home or Self Care      Destination       Service Provider Services Address Phone Fax Patient Preferred    [facility] [address] [facility] [address] [phone] --              Home Medical Care       Service Provider Services Address Phone Fax Patient Preferred    [facility] ,[facility] [address], [address], [facility] [address] [phone]        Internal Comment last updated by Alon Hernandez, RN 10/11/2024 1015    Patient is current with HEENA Fermin RN                                     Expected Discharge Date and Time       Expected Discharge Date Expected Discharge Time    Jan 17, 2025            Demographic Summary       Row Name 01/17/25 1446       General Information    Admission Type inpatient                   Functional Status       Row Name 01/17/25 1446       Functional Status    Usual Activity Tolerance excellent    Current Activity Tolerance moderate       Functional Status, IADL    Medications independent    Meal Preparation assistive person    Housekeeping assistive person    Laundry assistive person    Shopping assistive person       Mental Status    General Appearance WDL WDL       Mental Status Summary    Recent Changes in Mental Status/Cognitive Functioning no changes                   Psychosocial    No documentation.                  Abuse/Neglect    No documentation.                  Legal       Row Name 01/17/25 1446       Financial/Legal    Who Manages Finances if Patient Unable sons                   Substance Abuse    No documentation.                  Patient Forms    No  documentation.                     Savita Gomez RN

## 2025-01-17 NOTE — PROGRESS NOTES
Nutrition Services    Patient Name: Halie Guidry  YOB: 1940  MRN: [id number]  Admission date: 1/14/2025    PROGRESS NOTE      Encounter Information: Patient seen today for TONY, admitted with leg pain, wound check, and hypoglycemia. Multiple diabetic ulcers noted. PMH includes diabetes, CHF, peripheral neuropathy, spinal stenosis, pedal edema.     She was sleeping x 2 attempts for RD visit, EMR reviewed, patient eating well on CC Heart Healthy diet, appropriate given PMH. Weight reviewed, no concerns for significant weight loss.        PO Diet: Diet: Cardiac, Diabetic; Healthy Heart (2-3 Na+); Consistent Carbohydrate; Fluid Consistency: Thin (IDDSI 0)   PO Intake:  %            Labs (reviewed below): Consisted with  PMH, Dx        GI Function:  WDL, Last BM 1/16/2025        Nutrition Intervention Updates:        Results from last 7 days   Lab Units 01/17/25  0506 01/16/25  0606 01/15/25  0556 01/14/25 2002   SODIUM mmol/L 142 144 140 140   POTASSIUM mmol/L 3.8 3.8 3.8 3.5   CHLORIDE mmol/L 106 109* 109* 112*   CO2 mmol/L 20.4* 19.6* 18.5* 15.7*   BUN mg/dL 52* 45* 44* 41*   CREATININE mg/dL 2.61* 2.61* 2.43* 2.48*   CALCIUM mg/dL 8.0* 8.1* 8.4* 7.7*   BILIRUBIN mg/dL  --   --  0.2 <0.2   ALK PHOS U/L  --   --  138* 129*   ALT (SGPT) U/L  --   --  6 5   AST (SGOT) U/L  --   --  10 13   GLUCOSE mg/dL 168* 127* 160* 63*     Results from last 7 days   Lab Units 01/17/25  0506 01/15/25  0556 01/14/25 2002   MAGNESIUM mg/dL  --   --  1.5*   PHOSPHORUS mg/dL  --   --  4.7*   HEMOGLOBIN g/dL 9.0*   < > 9.4*   HEMATOCRIT % 28.1*   < > 28.6*    < > = values in this interval not displayed.     COVID19   Date Value Ref Range Status   01/27/2024 Not Detected Not Detected - Ref. Range Final     Lab Results   Component Value Date    HGBA1C 9.10 (H) 11/06/2024       Wt Readings from Last 10 Encounters:   01/15/25 0237 101 kg (223 lb 12.3 oz)   01/14/25 2130 95 kg (209 lb 7 oz)   11/11/24 0508 92.1 kg  (203 lb 0.7 oz)   11/10/24 0512 94.1 kg (207 lb 7.3 oz)   11/09/24 0500 96 kg (211 lb 10.3 oz)   11/08/24 0500 95.1 kg (209 lb 10.5 oz)   11/07/24 0505 95.4 kg (210 lb 5.1 oz)   11/05/24 2207 98.9 kg (218 lb 0.6 oz)   11/05/24 1758 98.2 kg (216 lb 7.9 oz)   10/19/24 0600 98.2 kg (216 lb 7.9 oz)   10/17/24 0500 98 kg (216 lb 0.8 oz)   10/16/24 0603 99.5 kg (219 lb 5.7 oz)   10/15/24 0448 98.7 kg (217 lb 9.5 oz)   10/13/24 0400 97.9 kg (215 lb 13.3 oz)   10/12/24 0600 97.4 kg (214 lb 11.7 oz)   09/16/24 1433 81.6 kg (180 lb)   09/10/24 1357 77.2 kg (170 lb 3.2 oz)   09/04/24 0528 96.7 kg (213 lb 3 oz)   09/03/24 0554 96.1 kg (211 lb 13.8 oz)   09/02/24 0556 95.5 kg (210 lb 8.6 oz)   09/01/24 0650 92.4 kg (203 lb 11.3 oz)   08/31/24 0625 95.8 kg (211 lb 3.2 oz)   08/30/24 0649 96.7 kg (213 lb 3 oz)   08/29/24 0519 98.6 kg (217 lb 6 oz)   08/28/24 0536 96.2 kg (212 lb 1.3 oz)   08/27/24 0604 98.1 kg (216 lb 4.3 oz)   08/26/24 0803 97.5 kg (215 lb)   08/26/24 0700 97.6 kg (215 lb 2.7 oz)   08/25/24 0700 92.1 kg (203 lb 0.7 oz)   08/24/24 2215 93.3 kg (205 lb 11 oz)   07/18/24 1743 77.1 kg (169 lb 15.6 oz)   06/24/24 1512 77.1 kg (170 lb)   06/10/24 1255 77.1 kg (170 lb)   04/21/24 0034 84.1 kg (185 lb 6.5 oz)   04/20/24 1921 90.6 kg (199 lb 11.8 oz)       No nutrition intervention at this time, monitor intakes, could possibly benefit from Boost Glucose control in the future.     RD to follow up per protocol.    Electronically signed by:  Gwendolyn Flores RD  01/17/25 14:58 EST

## 2025-01-17 NOTE — PROGRESS NOTES
Name: Halie Guidry ADMIT: 2025   : 1940  PCP: Napoleon Mead MD    MRN: [id number] LOS: 1 days   AGE/SEX: 84 y.o. female  ROOM: [facility]     Subjective   Subjective   No acute events. Patient overall feels better. Denies new complaints. No family at bedside.     Objective   Objective   Vital Signs  Temp:  [97.5 °F (36.4 °C)-98.2 °F (36.8 °C)] 97.5 °F (36.4 °C)  Heart Rate:  [67-74] 73  Resp:  [16-18] 16  BP: (163-188)/(57-69) 178/63  SpO2:  [94 %-100 %] 94 %  on   ;   Device (Oxygen Therapy): room air  Body mass index is 37.24 kg/m².  Physical Exam  Vitals and nursing note reviewed.   HENT:      Head: Normocephalic and atraumatic.      Nose: Nose normal.      Mouth/Throat:      Mouth: Mucous membranes are moist.      Pharynx: Oropharynx is clear.   Eyes:      Conjunctiva/sclera: Conjunctivae normal.      Pupils: Pupils are equal, round, and reactive to light.   Cardiovascular:      Rate and Rhythm: Normal rate and regular rhythm.      Pulses: Normal pulses.   Pulmonary:      Effort: Pulmonary effort is normal.   Abdominal:      General: Bowel sounds are normal. There is no distension.      Palpations: Abdomen is soft.      Tenderness: There is no abdominal tenderness.   Musculoskeletal:         General: Swelling and tenderness present.      Cervical back: Neck supple.   Skin:     General: Skin is warm and dry.      Capillary Refill: Capillary refill takes less than 2 seconds.      Findings: Erythema (BLE) present.   Neurological:      General: No focal deficit present.      Mental Status: She is alert and oriented to person, place, and time.   Psychiatric:         Mood and Affect: Mood normal.         Behavior: Behavior normal.       Results Review     I reviewed the patient's new clinical results.  Results from last 7 days   Lab Units 25  0506 25  0606 01/15/25  0556 25   WBC 10*3/mm3 9.94 8.66 11.86* 12.99*   HEMOGLOBIN g/dL 9.0* 8.3* 9.2* 9.4*   PLATELETS 10*3/mm3  285 284 327 303     Results from last 7 days   Lab Units 01/17/25  0506 01/16/25  0606 01/15/25  0556 01/14/25 2002   SODIUM mmol/L 142 144 140 140   POTASSIUM mmol/L 3.8 3.8 3.8 3.5   CHLORIDE mmol/L 106 109* 109* 112*   CO2 mmol/L 20.4* 19.6* 18.5* 15.7*   BUN mg/dL 52* 45* 44* 41*   CREATININE mg/dL 2.61* 2.61* 2.43* 2.48*   GLUCOSE mg/dL 168* 127* 160* 63*   EGFR mL/min/1.73 17.6* 17.6* 19.2* 18.7*     Results from last 7 days   Lab Units 01/15/25  0556 01/14/25 2002   ALBUMIN g/dL 3.3* 3.2*   BILIRUBIN mg/dL 0.2 <0.2   ALK PHOS U/L 138* 129*   AST (SGOT) U/L 10 13   ALT (SGPT) U/L 6 5     Results from last 7 days   Lab Units 01/17/25  0506 01/16/25  0606 01/15/25  0556 01/14/25 2002   CALCIUM mg/dL 8.0* 8.1* 8.4* 7.7*   ALBUMIN g/dL  --   --  3.3* 3.2*   MAGNESIUM mg/dL  --   --   --  1.5*   PHOSPHORUS mg/dL  --   --   --  4.7*     Results from last 7 days   Lab Units 01/14/25 2002   PROCALCITONIN ng/mL 0.11   LACTATE mmol/L 1.3     Glucose   Date/Time Value Ref Range Status   01/17/2025 1046 303 (H) 70 - 130 mg/dL Final   01/17/2025 0617 291 (H) 70 - 130 mg/dL Final   01/16/2025 2006 203 (H) 70 - 130 mg/dL Final   01/16/2025 1713 255 (H) 70 - 130 mg/dL Final   01/16/2025 1200 206 (H) 70 - 130 mg/dL Final   01/16/2025 0702 120 70 - 130 mg/dL Final   01/15/2025 2133 238 (H) 70 - 130 mg/dL Final       No radiology results for the last day    I have personally reviewed all medications:  Scheduled Medications  ammonium lactate, 1 Application, Topical, BID  atorvastatin, 80 mg, Oral, Nightly  bisoprolol, 5 mg, Oral, Daily  cefTAZidime, 2,000 mg, Intravenous, Q24H  enoxaparin, 30 mg, Subcutaneous, Nightly  fluticasone, 2 spray, Nasal, Daily  hydrocortisone-bacitracin-zinc oxide-nystatin, 1 Application, Topical, Q12H  insulin lispro, 2-7 Units, Subcutaneous, 4x Daily AC & at Bedtime  levothyroxine, 137 mcg, Oral, Q AM  pantoprazole, 40 mg, Oral, Q AM  pregabalin, 75 mg, Oral, BID  terazosin, 1 mg, Oral,  Nightly  torsemide, 40 mg, Oral, BID  vancomycin, 750 mg, Intravenous, Q24H    Infusions  Pharmacy to Dose enoxaparin (LOVENOX),   Pharmacy to dose vancomycin,     Diet  Diet: Cardiac, Diabetic; Healthy Heart (2-3 Na+); Consistent Carbohydrate; Fluid Consistency: Thin (IDDSI 0)    I have personally reviewed:  [x]  Laboratory   [x]  Microbiology   []  Radiology   [x]  EKG/Telemetry  []  Cardiology/Vascular   []  Pathology    []  Records    Assessment/Plan     Active Hospital Problems    Diagnosis  POA    **Cellulitis of right lower extremity [L03.115]  Yes    CKD (chronic kidney disease) stage 4, GFR 15-29 ml/min [N18.4]  Yes    Pancreatic neoplasm [D49.0]  Yes    Gastroesophageal reflux disease [K21.9]  Yes    Type 2 diabetes mellitus with hyperglycemia [E11.65]  Yes    Severe hypothyroidism [E03.9]  Yes    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   RLE Cellulitis  - has chronic RLE wound, diabetic patient  - continue vancomycin and fortaz, blood and wound cx's NGTD  - continue pain control and local wound care     Type 2 DM  - complications include neuropathy and nephropathy  - BG elevated  - start on lantus 15 units daily  - continue coverage with ssi/hypoglycemia protocol low dose     CKD Stage 4  - baseline serum creatinine is 2.3-2.4  - monitor with above treatments  - continue torsemide     Hypothyroidism  - continue levothyroxine      Lovenox 30 mg SC daily for DVT prophylaxis.  Full code.  Discussed with patient, nursing staff, and care team on multidisciplinary rounds.  Anticipate discharge to SNU facility in 2-3 days.  Expected Discharge Date: 1/17/2025; Expected Discharge Time:       Lewis Sheppard MD  [facility]  01/17/25  12:25 EST    Portions of this text have been  copied and I have reviewed them. They are accurate as of 1/17/2025

## 2025-01-17 NOTE — PLAN OF CARE
Goal Outcome Evaluation:  Plan of Care Reviewed With: patient        Progress: no change  Outcome Evaluation:       A&O x4. BP elevated. PRN labetalol given. SR on telemetry. Room air.       C/O BLE pain.   PRN tylenol given.       Skin care provided. to LLE.   Ammonium Lactate applied as ordered.       Accuchecks.         Will continue to monitor.

## 2025-01-17 NOTE — CASE MANAGEMENT/SOCIAL WORK
Discharge Planning Assessment  [facility]     Patient Name: Halie Guidry  MRN: [id number]  Today's Date: 1/17/2025    Admit Date: 1/14/2025    Plan: Acute rehab pending aceptance and precert vs SNF if not a candidate   Discharge Needs Assessment    No documentation.                  Discharge Plan    No documentation.                 Continued Care and Services - Admitted Since 1/14/2025       Destination       Service Provider Request Status Services Address Phone Fax Patient Preferred    [facility] [place] [id number] [place] [address] [phone] --              Home Medical Care       Service Provider Request Status Services Address Phone Fax Patient Preferred    [facility] [address], [address], [facility] [address] [phone] --                  Selected Continued Care - Episodes Includes continued care and service providers with selected services from the active episodes listed below      [facility] Episode start date: 9/10/2024   There are no active outsourced providers for this episode.                 Selected Continued Care - Prior Encounters Includes continued care and service providers with selected services from prior encounters from 10/16/2024 to 1/17/2025      Discharged on 11/11/2024 Admission date: 11/5/2024 - Discharge disposition: Home or Self Care      Home Medical Care       Service Provider Services Address Phone Fax Patient Preferred    [facility] [address], [address], [facility] [address] [phone] --                      Discharged on 10/19/2024 Admission date: 10/10/2024 - Discharge disposition: Home or Self Care      Destination       Service Provider Services Address Phone Fax Patient Preferred    [facility] [address]  [facility] [address] [phone] --              Home Medical Care       Service Provider Services Address Phone Fax Patient Preferred    [facility] [address], [address], [facility] [address] [phone]        Internal Comment last updated by Alon Hernandez, RN 10/11/2024 1015    Patient is current with [facility]. HEENA Ruggiero RN                                     Expected Discharge Date and Time       Expected Discharge Date Expected Discharge Time    Jan 17, 2025            Demographic Summary    No documentation.                  Functional Status    No documentation.                  Psychosocial    No documentation.                  Abuse/Neglect    No documentation.                  Legal    No documentation.                  Substance Abuse    No documentation.                  Patient Forms    No documentation.                     Savita Gomez, RN

## 2025-01-18 LAB
ANION GAP SERPL CALCULATED.3IONS-SCNC: 17.6 MMOL/L (ref 5–15)
BASOPHILS # BLD AUTO: 0.08 10*3/MM3 (ref 0–0.2)
BASOPHILS NFR BLD AUTO: 0.9 % (ref 0–1.5)
BUN SERPL-MCNC: 54 MG/DL (ref 8–23)
BUN/CREAT SERPL: 17.9 (ref 7–25)
CALCIUM SPEC-SCNC: 7.8 MG/DL (ref 8.6–10.5)
CHLORIDE SERPL-SCNC: 103 MMOL/L (ref 98–107)
CO2 SERPL-SCNC: 18.4 MMOL/L (ref 22–29)
CREAT SERPL-MCNC: 3.01 MG/DL (ref 0.57–1)
DEPRECATED RDW RBC AUTO: 44.9 FL (ref 37–54)
EGFRCR SERPLBLD CKD-EPI 2021: 14.8 ML/MIN/1.73
EOSINOPHIL # BLD AUTO: 0.68 10*3/MM3 (ref 0–0.4)
EOSINOPHIL NFR BLD AUTO: 7.6 % (ref 0.3–6.2)
ERYTHROCYTE [DISTWIDTH] IN BLOOD BY AUTOMATED COUNT: 14.9 % (ref 12.3–15.4)
GLUCOSE BLDC GLUCOMTR-MCNC: 166 MG/DL (ref 70–130)
GLUCOSE BLDC GLUCOMTR-MCNC: 178 MG/DL (ref 70–130)
GLUCOSE BLDC GLUCOMTR-MCNC: 252 MG/DL (ref 70–130)
GLUCOSE BLDC GLUCOMTR-MCNC: 271 MG/DL (ref 70–130)
GLUCOSE BLDC GLUCOMTR-MCNC: 281 MG/DL (ref 70–130)
GLUCOSE SERPL-MCNC: 160 MG/DL (ref 65–99)
HCT VFR BLD AUTO: 27.6 % (ref 34–46.6)
HGB BLD-MCNC: 9 G/DL (ref 12–15.9)
IMM GRANULOCYTES # BLD AUTO: 0.05 10*3/MM3 (ref 0–0.05)
IMM GRANULOCYTES NFR BLD AUTO: 0.6 % (ref 0–0.5)
LYMPHOCYTES # BLD AUTO: 1.13 10*3/MM3 (ref 0.7–3.1)
LYMPHOCYTES NFR BLD AUTO: 12.6 % (ref 19.6–45.3)
MCH RBC QN AUTO: 27.6 PG (ref 26.6–33)
MCHC RBC AUTO-ENTMCNC: 32.6 G/DL (ref 31.5–35.7)
MCV RBC AUTO: 84.7 FL (ref 79–97)
MONOCYTES # BLD AUTO: 0.78 10*3/MM3 (ref 0.1–0.9)
MONOCYTES NFR BLD AUTO: 8.7 % (ref 5–12)
NEUTROPHILS NFR BLD AUTO: 6.24 10*3/MM3 (ref 1.7–7)
NEUTROPHILS NFR BLD AUTO: 69.6 % (ref 42.7–76)
NRBC BLD AUTO-RTO: 0 /100 WBC (ref 0–0.2)
PLATELET # BLD AUTO: 297 10*3/MM3 (ref 140–450)
PMV BLD AUTO: 10.3 FL (ref 6–12)
POTASSIUM SERPL-SCNC: 3.6 MMOL/L (ref 3.5–5.2)
RBC # BLD AUTO: 3.26 10*6/MM3 (ref 3.77–5.28)
SODIUM SERPL-SCNC: 139 MMOL/L (ref 136–145)
VANCOMYCIN SERPL-MCNC: 21.7 MCG/ML (ref 5–40)
WBC NRBC COR # BLD AUTO: 8.96 10*3/MM3 (ref 3.4–10.8)

## 2025-01-18 PROCEDURE — 85025 COMPLETE CBC W/AUTO DIFF WBC: CPT | Performed by: INTERNAL MEDICINE

## 2025-01-18 PROCEDURE — 63710000001 INSULIN LISPRO (HUMAN) PER 5 UNITS: Performed by: INTERNAL MEDICINE

## 2025-01-18 PROCEDURE — 25010000002 CEFTAZIDIME 2 G RECONSTITUTED SOLUTION 1 EACH VIAL: Performed by: INTERNAL MEDICINE

## 2025-01-18 PROCEDURE — 25010000002 ENOXAPARIN PER 10 MG: Performed by: NURSE PRACTITIONER

## 2025-01-18 PROCEDURE — 80048 BASIC METABOLIC PNL TOTAL CA: CPT | Performed by: INTERNAL MEDICINE

## 2025-01-18 PROCEDURE — 80202 ASSAY OF VANCOMYCIN: CPT | Performed by: INTERNAL MEDICINE

## 2025-01-18 PROCEDURE — 82948 REAGENT STRIP/BLOOD GLUCOSE: CPT

## 2025-01-18 PROCEDURE — 25010000002 LABETALOL 5 MG/ML SOLUTION: Performed by: NURSE PRACTITIONER

## 2025-01-18 PROCEDURE — 63710000001 INSULIN GLARGINE PER 5 UNITS: Performed by: INTERNAL MEDICINE

## 2025-01-18 RX ORDER — INSULIN LISPRO 100 [IU]/ML
5 INJECTION, SOLUTION INTRAVENOUS; SUBCUTANEOUS
Status: DISCONTINUED | OUTPATIENT
Start: 2025-01-18 | End: 2025-01-19

## 2025-01-18 RX ADMIN — TORSEMIDE 40 MG: 20 TABLET ORAL at 20:02

## 2025-01-18 RX ADMIN — Medication 1 APPLICATION: at 20:03

## 2025-01-18 RX ADMIN — ZINC OXIDE 1 APPLICATION: 200 OINTMENT TOPICAL at 08:47

## 2025-01-18 RX ADMIN — INSULIN LISPRO 2 UNITS: 100 INJECTION, SOLUTION INTRAVENOUS; SUBCUTANEOUS at 08:46

## 2025-01-18 RX ADMIN — Medication 5 MG: at 20:03

## 2025-01-18 RX ADMIN — ACETAMINOPHEN 650 MG: 325 TABLET, FILM COATED ORAL at 15:34

## 2025-01-18 RX ADMIN — TORSEMIDE 40 MG: 20 TABLET ORAL at 08:46

## 2025-01-18 RX ADMIN — PREGABALIN 75 MG: 75 CAPSULE ORAL at 20:02

## 2025-01-18 RX ADMIN — TERAZOSIN HYDROCHLORIDE 1 MG: 1 CAPSULE ORAL at 20:03

## 2025-01-18 RX ADMIN — INSULIN LISPRO 4 UNITS: 100 INJECTION, SOLUTION INTRAVENOUS; SUBCUTANEOUS at 18:18

## 2025-01-18 RX ADMIN — ZINC OXIDE 1 APPLICATION: 200 OINTMENT TOPICAL at 20:03

## 2025-01-18 RX ADMIN — LABETALOL HYDROCHLORIDE 10 MG: 5 INJECTION, SOLUTION INTRAVENOUS at 10:14

## 2025-01-18 RX ADMIN — PREGABALIN 75 MG: 75 CAPSULE ORAL at 08:46

## 2025-01-18 RX ADMIN — BISOPROLOL FUMARATE 5 MG: 5 TABLET ORAL at 08:46

## 2025-01-18 RX ADMIN — ACETAMINOPHEN 650 MG: 325 TABLET, FILM COATED ORAL at 20:02

## 2025-01-18 RX ADMIN — INSULIN LISPRO 4 UNITS: 100 INJECTION, SOLUTION INTRAVENOUS; SUBCUTANEOUS at 13:11

## 2025-01-18 RX ADMIN — CEFTAZIDIME 2000 MG: 2 INJECTION, POWDER, FOR SOLUTION INTRAVENOUS at 08:40

## 2025-01-18 RX ADMIN — ACETAMINOPHEN 650 MG: 325 TABLET, FILM COATED ORAL at 10:14

## 2025-01-18 RX ADMIN — LEVOTHYROXINE SODIUM 137 MCG: 0.03 TABLET ORAL at 05:21

## 2025-01-18 RX ADMIN — ATORVASTATIN CALCIUM 80 MG: 80 TABLET, FILM COATED ORAL at 20:03

## 2025-01-18 RX ADMIN — INSULIN GLARGINE 15 UNITS: 100 INJECTION, SOLUTION SUBCUTANEOUS at 08:46

## 2025-01-18 RX ADMIN — PANTOPRAZOLE SODIUM 40 MG: 40 TABLET, DELAYED RELEASE ORAL at 05:21

## 2025-01-18 RX ADMIN — LABETALOL HYDROCHLORIDE 10 MG: 5 INJECTION, SOLUTION INTRAVENOUS at 16:22

## 2025-01-18 RX ADMIN — INSULIN LISPRO 5 UNITS: 100 INJECTION, SOLUTION INTRAVENOUS; SUBCUTANEOUS at 18:18

## 2025-01-18 RX ADMIN — ENOXAPARIN SODIUM 30 MG: 100 INJECTION SUBCUTANEOUS at 20:02

## 2025-01-18 RX ADMIN — INSULIN LISPRO 4 UNITS: 100 INJECTION, SOLUTION INTRAVENOUS; SUBCUTANEOUS at 20:51

## 2025-01-18 RX ADMIN — Medication 1 APPLICATION: at 08:47

## 2025-01-18 RX ADMIN — ACETAMINOPHEN 650 MG: 325 TABLET, FILM COATED ORAL at 03:45

## 2025-01-18 RX ADMIN — FLUTICASONE PROPIONATE 2 SPRAY: 50 SPRAY, METERED NASAL at 08:46

## 2025-01-18 NOTE — PROGRESS NOTES
"[facility] Clinical Pharmacy Services: Vancomycin Monitoring Note    Halie Guidry is a 84 y.o. female who is on day 4 of pharmacy to dose vancomycin for RLE diabetic ulcer with surrounding cellulitis.    Previous Vancomycin Dose: 500 mg IV every 24 hours, last given 1/17 at 1848    Updated Cultures and Sensitivities:   1/14: BCx-NGTD  1/16: R leg WCx-GNB (2+)    Results from last 7 days   Lab Units 01/17/25  1412 01/16/25  0606 01/15/25  0556   VANCOMYCIN RM mcg/mL  --  18.60 20.50   VANCOMYCIN TR mcg/mL 20.10*  --   --      Vitals/Labs  Ht: 165.1 cm (65\"); Wt: 101 kg (223 lb 12.3 oz)   Temp Readings from Last 1 Encounters:   01/18/25 98.1 °F (36.7 °C) (Oral)     Estimated Creatinine Clearance: 16.5 mL/min (A) (by C-G formula based on SCr of 3.01 mg/dL (H)).     Results from last 7 days   Lab Units 01/18/25  0514 01/17/25  0506 01/16/25  0606   CREATININE mg/dL 3.01* 2.61* 2.61*   WBC 10*3/mm3 8.96 9.94 8.66     Assessment/Plan    Current Vancomycin Dose: 500 mg IV every 24 hours. SCr increased overnight from 2.61 to 3.01. Will stop the scheduled vancomycin regimen and dose intermittently.  Next Level Date and Time: Vanc Random has been scheduled for [facility] 1/18 at 1700.   We will continue to monitor patient changes and renal function until vancomycin is discontinued or patient is discharged-SCr is now 3.01. BMP has been ordered daily to trend renal function while the patient is on vancomycin.     Thank you for involving pharmacy in this patient's care. Please contact pharmacy with any questions or concerns.       Lizz Hernández, Pharm.D., [facility]   Clinical Pharmacist  "

## 2025-01-18 NOTE — PROGRESS NOTES
Name: Halie Guidry ADMIT: 2025   : 1940  PCP: Napoleon Mead MD    MRN: [id number] LOS: 2 days   AGE/SEX: 84 y.o. female  ROOM: [facility]     Subjective   Subjective   No acute events. Patient overall feels better. Denies new complaints. No family at bedside.     Objective   Objective   Vital Signs  Temp:  [97.5 °F (36.4 °C)-98.8 °F (37.1 °C)] 98.1 °F (36.7 °C)  Heart Rate:  [62-69] 62  Resp:  [18] 18  BP: (145-192)/(58-64) 171/58  SpO2:  [92 %-100 %] 98 %  on   ;   Device (Oxygen Therapy): room air  Body mass index is 37.24 kg/m².  Physical Exam  Vitals and nursing note reviewed.   HENT:      Head: Normocephalic and atraumatic.      Nose: Nose normal.      Mouth/Throat:      Mouth: Mucous membranes are moist.      Pharynx: Oropharynx is clear.   Eyes:      Conjunctiva/sclera: Conjunctivae normal.      Pupils: Pupils are equal, round, and reactive to light.   Cardiovascular:      Rate and Rhythm: Normal rate and regular rhythm.      Pulses: Normal pulses.   Pulmonary:      Effort: Pulmonary effort is normal.   Abdominal:      General: Bowel sounds are normal. There is no distension.      Palpations: Abdomen is soft.      Tenderness: There is no abdominal tenderness.   Musculoskeletal:         General: Swelling and tenderness present.      Cervical back: Neck supple.   Skin:     General: Skin is warm and dry.      Capillary Refill: Capillary refill takes less than 2 seconds.      Findings: Erythema (BLE, improving) present.   Neurological:      General: No focal deficit present.      Mental Status: She is alert and oriented to person, place, and time.   Psychiatric:         Mood and Affect: Mood normal.         Behavior: Behavior normal.       Results Review     I reviewed the patient's new clinical results.  Results from last 7 days   Lab Units 25  0514 25  0506 25  0606 01/15/25  0556   WBC 10*3/mm3 8.96 9.94 8.66 11.86*   HEMOGLOBIN g/dL 9.0* 9.0* 8.3* 9.2*   PLATELETS  10*3/mm3 297 285 284 327     Results from last 7 days   Lab Units 01/18/25  0514 01/17/25  0506 01/16/25  0606 01/15/25  0556   SODIUM mmol/L 139 142 144 140   POTASSIUM mmol/L 3.6 3.8 3.8 3.8   CHLORIDE mmol/L 103 106 109* 109*   CO2 mmol/L 18.4* 20.4* 19.6* 18.5*   BUN mg/dL 54* 52* 45* 44*   CREATININE mg/dL 3.01* 2.61* 2.61* 2.43*   GLUCOSE mg/dL 160* 168* 127* 160*   EGFR mL/min/1.73 14.8* 17.6* 17.6* 19.2*     Results from last 7 days   Lab Units 01/15/25  0556 01/14/25 2002   ALBUMIN g/dL 3.3* 3.2*   BILIRUBIN mg/dL 0.2 <0.2   ALK PHOS U/L 138* 129*   AST (SGOT) U/L 10 13   ALT (SGPT) U/L 6 5     Results from last 7 days   Lab Units 01/18/25  0514 01/17/25  0506 01/16/25  0606 01/15/25  0556 01/14/25 2002   CALCIUM mg/dL 7.8* 8.0* 8.1* 8.4* 7.7*   ALBUMIN g/dL  --   --   --  3.3* 3.2*   MAGNESIUM mg/dL  --   --   --   --  1.5*   PHOSPHORUS mg/dL  --   --   --   --  4.7*     Results from last 7 days   Lab Units 01/14/25 2002   PROCALCITONIN ng/mL 0.11   LACTATE mmol/L 1.3     Glucose   Date/Time Value Ref Range Status   01/18/2025 1122 252 (H) 70 - 130 mg/dL Final   01/18/2025 0540 178 (H) 70 - 130 mg/dL Final   01/17/2025 2038 264 (H) 70 - 130 mg/dL Final   01/17/2025 1636 242 (H) 70 - 130 mg/dL Final   01/17/2025 1046 303 (H) 70 - 130 mg/dL Final   01/17/2025 0617 291 (H) 70 - 130 mg/dL Final   01/16/2025 2006 203 (H) 70 - 130 mg/dL Final       No radiology results for the last day    I have personally reviewed all medications:  Scheduled Medications  ammonium lactate, 1 Application, Topical, BID  atorvastatin, 80 mg, Oral, Nightly  bisoprolol, 5 mg, Oral, Daily  cefTAZidime, 2,000 mg, Intravenous, Q24H  enoxaparin, 30 mg, Subcutaneous, Nightly  fluticasone, 2 spray, Nasal, Daily  hydrocortisone-bacitracin-zinc oxide-nystatin, 1 Application, Topical, Q12H  insulin glargine, 15 Units, Subcutaneous, Daily  insulin lispro, 2-7 Units, Subcutaneous, 4x Daily AC & at Bedtime  levothyroxine, 137 mcg, Oral, Q  AM  pantoprazole, 40 mg, Oral, Q AM  pregabalin, 75 mg, Oral, BID  terazosin, 1 mg, Oral, Nightly  torsemide, 40 mg, Oral, BID  [START ON 1/19/2025] Vancomycin Pharmacy Intermittent/Pulse Dosing, , Not Applicable, Daily    Infusions  Pharmacy to dose vancomycin,     Diet  Diet: Cardiac, Diabetic; Healthy Heart (2-3 Na+); Consistent Carbohydrate; Fluid Consistency: Thin (IDDSI 0)    I have personally reviewed:  [x]  Laboratory   [x]  Microbiology   []  Radiology   [x]  EKG/Telemetry  []  Cardiology/Vascular   []  Pathology    []  Records    Assessment/Plan     Active Hospital Problems    Diagnosis  POA    **Cellulitis of right lower extremity [L03.115]  Yes    CKD (chronic kidney disease) stage 4, GFR 15-29 ml/min [N18.4]  Yes    Pancreatic neoplasm [D49.0]  Yes    Gastroesophageal reflux disease [K21.9]  Yes    Type 2 diabetes mellitus with hyperglycemia [E11.65]  Yes    Severe hypothyroidism [E03.9]  Yes    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   RLE Cellulitis  - has chronic RLE wound, diabetic patient  - continue vancomycin and fortaz, blood and wound cx's NGTD  - continue pain control and local wound care     Type 2 DM  - complications include neuropathy and nephropathy  - BG elevated  - increase lantus to 20 units daily  - add lispro 5 units TID with meals  - continue coverage with ssi/hypoglycemia protocol low dose     CKD Stage 4  - baseline serum creatinine is 2.3-2.4  - monitor with above treatments  - continue torsemide     Hypothyroidism  - continue levothyroxine      Lovenox 30 mg SC daily for DVT prophylaxis.  Full code.  Discussed with patient and nursing staff.  Anticipate discharge to SNU facility in 2-3 days.  Expected Discharge Date: 1/20/2025; Expected Discharge Time:       Lewis ALMENDAREZ  MD Valarie  [facility]  01/18/25  14:57 EST    Portions of this text have been copied and I have reviewed them. They are accurate as of 1/18/2025

## 2025-01-18 NOTE — PLAN OF CARE
Goal Outcome Evaluation:  Plan of Care Reviewed With: patient        Progress: no change  Outcome Evaluation:       A&O x4. SR on telemetry. Room air.    BP elevated.   PRN Labetalol given with improvement.        C/O BLE pain.   PRN tylenol given with improvement.       Skin care provided to LLE.   Ammonium Lactate applied as ordered.     Dressing to RLE. C/D/I.       Skin care provided to folds of breast, abdomen, and groin.    Anti-fungal powder applied.         Accuchecks.           Will continue to monitor.

## 2025-01-19 LAB
ANION GAP SERPL CALCULATED.3IONS-SCNC: 16 MMOL/L (ref 5–15)
BACTERIA SPEC AEROBE CULT: NORMAL
BACTERIA SPEC AEROBE CULT: NORMAL
BASOPHILS # BLD AUTO: 0.1 10*3/MM3 (ref 0–0.2)
BASOPHILS NFR BLD AUTO: 1.1 % (ref 0–1.5)
BUN SERPL-MCNC: 67 MG/DL (ref 8–23)
BUN/CREAT SERPL: 22 (ref 7–25)
CALCIUM SPEC-SCNC: 7.8 MG/DL (ref 8.6–10.5)
CHLORIDE SERPL-SCNC: 103 MMOL/L (ref 98–107)
CO2 SERPL-SCNC: 22 MMOL/L (ref 22–29)
CREAT SERPL-MCNC: 3.05 MG/DL (ref 0.57–1)
DEPRECATED RDW RBC AUTO: 48.6 FL (ref 37–54)
EGFRCR SERPLBLD CKD-EPI 2021: 14.6 ML/MIN/1.73
EOSINOPHIL # BLD AUTO: 0.58 10*3/MM3 (ref 0–0.4)
EOSINOPHIL NFR BLD AUTO: 6.6 % (ref 0.3–6.2)
ERYTHROCYTE [DISTWIDTH] IN BLOOD BY AUTOMATED COUNT: 15.2 % (ref 12.3–15.4)
GLUCOSE BLDC GLUCOMTR-MCNC: 222 MG/DL (ref 70–130)
GLUCOSE BLDC GLUCOMTR-MCNC: 231 MG/DL (ref 70–130)
GLUCOSE BLDC GLUCOMTR-MCNC: 232 MG/DL (ref 70–130)
GLUCOSE BLDC GLUCOMTR-MCNC: 256 MG/DL (ref 70–130)
GLUCOSE SERPL-MCNC: 224 MG/DL (ref 65–99)
HCT VFR BLD AUTO: 29.3 % (ref 34–46.6)
HGB BLD-MCNC: 9.4 G/DL (ref 12–15.9)
IMM GRANULOCYTES # BLD AUTO: 0.02 10*3/MM3 (ref 0–0.05)
IMM GRANULOCYTES NFR BLD AUTO: 0.2 % (ref 0–0.5)
LYMPHOCYTES # BLD AUTO: 1.29 10*3/MM3 (ref 0.7–3.1)
LYMPHOCYTES NFR BLD AUTO: 14.6 % (ref 19.6–45.3)
MCH RBC QN AUTO: 27.7 PG (ref 26.6–33)
MCHC RBC AUTO-ENTMCNC: 32.1 G/DL (ref 31.5–35.7)
MCV RBC AUTO: 86.4 FL (ref 79–97)
MONOCYTES # BLD AUTO: 0.68 10*3/MM3 (ref 0.1–0.9)
MONOCYTES NFR BLD AUTO: 7.7 % (ref 5–12)
NEUTROPHILS NFR BLD AUTO: 6.15 10*3/MM3 (ref 1.7–7)
NEUTROPHILS NFR BLD AUTO: 69.8 % (ref 42.7–76)
NRBC BLD AUTO-RTO: 0 /100 WBC (ref 0–0.2)
PLATELET # BLD AUTO: 291 10*3/MM3 (ref 140–450)
PMV BLD AUTO: 9.8 FL (ref 6–12)
POTASSIUM SERPL-SCNC: 3.9 MMOL/L (ref 3.5–5.2)
RBC # BLD AUTO: 3.39 10*6/MM3 (ref 3.77–5.28)
SODIUM SERPL-SCNC: 141 MMOL/L (ref 136–145)
VANCOMYCIN SERPL-MCNC: 18.9 MCG/ML (ref 5–40)
WBC NRBC COR # BLD AUTO: 8.82 10*3/MM3 (ref 3.4–10.8)

## 2025-01-19 PROCEDURE — 82948 REAGENT STRIP/BLOOD GLUCOSE: CPT

## 2025-01-19 PROCEDURE — 97530 THERAPEUTIC ACTIVITIES: CPT

## 2025-01-19 PROCEDURE — 63710000001 INSULIN GLARGINE PER 5 UNITS: Performed by: INTERNAL MEDICINE

## 2025-01-19 PROCEDURE — 63710000001 INSULIN LISPRO (HUMAN) PER 5 UNITS: Performed by: INTERNAL MEDICINE

## 2025-01-19 PROCEDURE — 25010000002 VANCOMYCIN PER 500 MG: Performed by: INTERNAL MEDICINE

## 2025-01-19 PROCEDURE — 80048 BASIC METABOLIC PNL TOTAL CA: CPT | Performed by: INTERNAL MEDICINE

## 2025-01-19 PROCEDURE — 25010000002 CEFTAZIDIME 2 G RECONSTITUTED SOLUTION 1 EACH VIAL: Performed by: INTERNAL MEDICINE

## 2025-01-19 PROCEDURE — 25010000002 ENOXAPARIN PER 10 MG: Performed by: NURSE PRACTITIONER

## 2025-01-19 PROCEDURE — 80202 ASSAY OF VANCOMYCIN: CPT | Performed by: INTERNAL MEDICINE

## 2025-01-19 PROCEDURE — 85025 COMPLETE CBC W/AUTO DIFF WBC: CPT | Performed by: INTERNAL MEDICINE

## 2025-01-19 RX ORDER — INSULIN LISPRO 100 [IU]/ML
7 INJECTION, SOLUTION INTRAVENOUS; SUBCUTANEOUS
Status: DISCONTINUED | OUTPATIENT
Start: 2025-01-19 | End: 2025-01-20

## 2025-01-19 RX ORDER — GUAIFENESIN 200 MG/10ML
200 LIQUID ORAL EVERY 4 HOURS PRN
Status: DISCONTINUED | OUTPATIENT
Start: 2025-01-19 | End: 2025-01-21 | Stop reason: HOSPADM

## 2025-01-19 RX ADMIN — INSULIN LISPRO 3 UNITS: 100 INJECTION, SOLUTION INTRAVENOUS; SUBCUTANEOUS at 12:50

## 2025-01-19 RX ADMIN — ZINC OXIDE 1 APPLICATION: 200 OINTMENT TOPICAL at 21:09

## 2025-01-19 RX ADMIN — ZINC OXIDE 1 APPLICATION: 200 OINTMENT TOPICAL at 09:37

## 2025-01-19 RX ADMIN — ACETAMINOPHEN 650 MG: 325 TABLET, FILM COATED ORAL at 09:44

## 2025-01-19 RX ADMIN — VANCOMYCIN HYDROCHLORIDE 500 MG: 500 INJECTION, POWDER, LYOPHILIZED, FOR SOLUTION INTRAVENOUS at 15:11

## 2025-01-19 RX ADMIN — GUAIFENESIN 200 MG: 100 LIQUID ORAL at 10:44

## 2025-01-19 RX ADMIN — PREGABALIN 75 MG: 75 CAPSULE ORAL at 21:02

## 2025-01-19 RX ADMIN — ATORVASTATIN CALCIUM 80 MG: 80 TABLET, FILM COATED ORAL at 21:02

## 2025-01-19 RX ADMIN — ACETAMINOPHEN 650 MG: 325 TABLET, FILM COATED ORAL at 05:23

## 2025-01-19 RX ADMIN — Medication 1 APPLICATION: at 09:37

## 2025-01-19 RX ADMIN — INSULIN GLARGINE 20 UNITS: 100 INJECTION, SOLUTION SUBCUTANEOUS at 09:36

## 2025-01-19 RX ADMIN — LEVOTHYROXINE SODIUM 137 MCG: 0.03 TABLET ORAL at 05:22

## 2025-01-19 RX ADMIN — INSULIN LISPRO 5 UNITS: 100 INJECTION, SOLUTION INTRAVENOUS; SUBCUTANEOUS at 12:50

## 2025-01-19 RX ADMIN — PREGABALIN 75 MG: 75 CAPSULE ORAL at 09:35

## 2025-01-19 RX ADMIN — PANTOPRAZOLE SODIUM 40 MG: 40 TABLET, DELAYED RELEASE ORAL at 05:23

## 2025-01-19 RX ADMIN — Medication 1 APPLICATION: at 22:00

## 2025-01-19 RX ADMIN — INSULIN LISPRO 7 UNITS: 100 INJECTION, SOLUTION INTRAVENOUS; SUBCUTANEOUS at 18:52

## 2025-01-19 RX ADMIN — INSULIN GLARGINE 10 UNITS: 100 INJECTION, SOLUTION SUBCUTANEOUS at 15:12

## 2025-01-19 RX ADMIN — FLUTICASONE PROPIONATE 2 SPRAY: 50 SPRAY, METERED NASAL at 09:36

## 2025-01-19 RX ADMIN — INSULIN LISPRO 3 UNITS: 100 INJECTION, SOLUTION INTRAVENOUS; SUBCUTANEOUS at 21:07

## 2025-01-19 RX ADMIN — TORSEMIDE 40 MG: 20 TABLET ORAL at 09:35

## 2025-01-19 RX ADMIN — INSULIN LISPRO 5 UNITS: 100 INJECTION, SOLUTION INTRAVENOUS; SUBCUTANEOUS at 09:36

## 2025-01-19 RX ADMIN — INSULIN LISPRO 3 UNITS: 100 INJECTION, SOLUTION INTRAVENOUS; SUBCUTANEOUS at 18:53

## 2025-01-19 RX ADMIN — TORSEMIDE 40 MG: 20 TABLET ORAL at 21:02

## 2025-01-19 RX ADMIN — BISOPROLOL FUMARATE 5 MG: 5 TABLET ORAL at 09:35

## 2025-01-19 RX ADMIN — CEFTAZIDIME 2000 MG: 2 INJECTION, POWDER, FOR SOLUTION INTRAVENOUS at 09:31

## 2025-01-19 RX ADMIN — TERAZOSIN HYDROCHLORIDE 1 MG: 1 CAPSULE ORAL at 21:03

## 2025-01-19 RX ADMIN — INSULIN LISPRO 4 UNITS: 100 INJECTION, SOLUTION INTRAVENOUS; SUBCUTANEOUS at 09:36

## 2025-01-19 RX ADMIN — ACETAMINOPHEN 650 MG: 325 TABLET, FILM COATED ORAL at 21:02

## 2025-01-19 RX ADMIN — ENOXAPARIN SODIUM 30 MG: 100 INJECTION SUBCUTANEOUS at 21:02

## 2025-01-19 NOTE — THERAPY TREATMENT NOTE
Patient Name: Halie Guidry  : 1940    MRN: [id number]                              Today's Date: 2025       Admit Date: 2025    Visit Dx:     ICD-10-CM ICD-9-CM   1. Cellulitis of right leg  L03.115 682.6   2. Ulcer of right lower extremity, unspecified ulcer stage  L97.919 707.10   3. Hypoglycemia  E16.2 251.2   4. Bilateral lower extremity edema  R60.0 782.3   5. Decreased activities of daily living (ADL)  Z78.9 V49.89     Patient Active Problem List   Diagnosis    Compression fracture    Lumbar degenerative disc disease    Type 2 diabetes mellitus with hyperglycemia, with long-term current use of insulin    Hyperlipidemia    Benign essential hypertension    Primary hypothyroidism    Neuropathy    Osteoporosis    Proteinuria    Tobacco abuse    Generalized weakness    Spinal stenosis    Scoliosis    Arthritis    VBI (vertebrobasilar insufficiency)    Orthostatic hypotension    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    Severe hypothyroidism    Noncompliance with medication regimen    Vitamin D deficiency disease    Tremor    Seizure    Thoracic degenerative disc disease    Acute kidney injury (VIPUL) with acute tubular necrosis (ATN)    Hyperglycemia    Type 2 diabetes mellitus with hyperglycemia    Lower abdominal pain    Transaminitis    Emphysematous cystitis    Choledocholithiasis    Hypokalemia    Hypoxia    Generalized abdominal pain    History of Clostridium difficile infection    History of ERCP    Hypomagnesemia    Anxiety disorder    Neuropathic pain    Intertrigo    Weakness of both lower extremities    Accelerated hypertension    Acute cystitis without hematuria    Left lower lobe pneumonia    Cellulitis and abscess of left lower extremity    Benign hypertension with CKD (chronic kidney disease) stage IV    Peripheral edema    Primary malignant neuroendocrine tumor of pancreas    Encounter for long-term (current) use of high-risk medication    Diabetic foot ulcer     CKD (chronic kidney disease) stage 4, GFR 15-29 ml/min    Pressure injury of buttock, stage 2    HTN (hypertension)    Anemia due to chronic kidney disease    Bilateral lower extremity edema    Cellulitis of right lower extremity    CKD (chronic kidney disease) stage 4, GFR 15-29 ml/min    Acute CHF    Bilateral cellulitis of lower leg    Acute UTI    UTI (urinary tract infection)    Acute UTI (urinary tract infection)    Metabolic acidosis    Cobalamin deficiency    Dependent edema    Gastroesophageal reflux disease    Mild neurocognitive disorder    Pancreatic neoplasm    Congestive heart failure    CHF exacerbation    Acute exacerbation of CHF (congestive heart failure)     Past Medical History:   Diagnosis Date    Acute metabolic encephalopathy 06/24/2022    Anxiety     Arthritis     Benign essential hypertension 08/20/2014    Bleeding disorder     Depression     Diabetes     Diabetes mellitus, type 2     Disc degeneration, lumbar     Headache, tension-type     Hyperlipidemia     Hypothyroidism     Neuropathy     Osteoporosis 09/09/2015    Pancreatic mass     Sept 2023, on Monthly Octreotide Injection    Peripheral neuropathy     Rotator cuff tear, left     Scoliosis     Shoulder pain     LEFT, TORN ROTATOR CUFF S/P FALL    Spinal stenosis      Past Surgical History:   Procedure Laterality Date    APPENDECTOMY      BILATERAL BREAST REDUCTION Bilateral 08/2015    CATARACT EXTRACTION  03/2015    CHOLECYSTECTOMY WITH INTRAOPERATIVE CHOLANGIOGRAM N/A 3/27/2022    Procedure: CHOLECYSTECTOMY LAPAROSCOPIC INTRAOPERATIVE CHOLANGIOGRAM;  Surgeon: Aiyana Hill MD;  Location: [facility] MAIN OR;  Service: General;  Laterality: N/A;    COLONOSCOPY  06/05/2015    WNL    ERCP N/A 2/25/2022    Procedure: ENDOSCOPIC RETROGRADE CHOLANGIOPANCREATOGRAPHY with sphincterotomy and balloon sweep;  Surgeon: Chilo Wilhelm MD;  Location: [facility] ENDOSCOPY;  Service: Gastroenterology;  Laterality: N/A;  PRE/POST - CBD stones     ERCP N/A 3/28/2022    Procedure: ENDOSCOPIC RETROGRADE CHOLANGIOPANCREATOGRAPHY WITH SPHINCTEROTOMY AND BALLOON SWEEP;  Surgeon: Chilo Wilhelm MD;  Location: [facility] ENDOSCOPY;  Service: Gastroenterology;  Laterality: N/A;  PRE: COMMON DUCT STONE  POST: COMMON DUCT STONE    KYPHOPLASTY      REDUCTION MAMMAPLASTY      TONSILLECTOMY        General Information       Row Name 01/19/25 1203          Physical Therapy Time and Intention    Document Type therapy note (daily note)  -EB     Mode of Treatment individual therapy;physical therapy  -EB       Row Name 01/19/25 1203          General Information    Patient Profile Reviewed yes  -EB     Existing Precautions/Restrictions fall  -EB       Row Name 01/19/25 1203          Cognition    Orientation Status (Cognition) oriented x 3  -EB       Row Name 01/19/25 1203          Safety Issues/Impairments Affecting Functional Mobility    Impairments Affecting Function (Mobility) balance;endurance/activity tolerance;strength  -EB               User Key  (r) = Recorded By, (t) = Taken By, (c) = Cosigned By      Initials Name Provider Type    EB Tita Power PTA Physical Therapist Assistant                   Mobility       Row Name 01/19/25 1208          Bed Mobility    Supine-Sit [place] (Bed Mobility) standby assist  -EB     Assistive Device (Bed Mobility) bed rails;head of bed elevated  -EB     Comment, (Bed Mobility) increased time needed  -EB       Row Name 01/19/25 1208          Bed-Chair Transfer    Bed-Chair [place] (Transfers) contact guard;minimum assist (75% patient effort);verbal cues;nonverbal cues (demo/gesture)  -EB     Assistive Device (Bed-Chair Transfers) walker, front-wheeled  -EB     Comment, (Bed-Chair Transfer) able to stand/pivot and take a few steps to the chair.  -EB       Row Name 01/19/25 1208          Sit-Stand Transfer    Sit-Stand [place] (Transfers) minimum assist (75% patient effort);moderate assist (50% patient effort);verbal  cues;nonverbal cues (demo/gesture)  -EB     Assistive Device (Sit-Stand Transfers) walker, front-wheeled  -EB     Comment, (Sit-Stand Transfer) forward flexed posture.  -EB               User Key  (r) = Recorded By, (t) = Taken By, (c) = Cosigned By      Initials Name Provider Type    Tita Escalera PTA Physical Therapist Assistant                   Obj/Interventions    No documentation.                  Goals/Plan    No documentation.                  Clinical Impression       Row Name 01/19/25 1214          Plan of Care Review    Plan of Care Reviewed With patient  -EB     Progress improving  -EB     Outcome Evaluation Pt seen for PT tx today. Pt eager to get up to the chair. Pt required increased time with mobility but able to complete supine to sit with SBA and stood from EOB to walker with Vaughn/ModA. Pt with forward flexed posture which she has at baseline. Pt was able to stand and take a couple of steps to the chair with CGA/Vaughn while pt using the walker. No unsteadiness or LOB during transfer. Will continue to follow and progress pt as able.  -EB       Row Name 01/19/25 1214          Therapy Assessment/Plan (PT)    Therapy Frequency (PT) 5 times/wk  -EB       Row Name 01/19/25 1214          Positioning and Restraints    Pre-Treatment Position in bed  -EB     Post Treatment Position chair  -EB     In Chair reclined;call light within reach;encouraged to call for assist;exit alarm on  -EB               User Key  (r) = Recorded By, (t) = Taken By, (c) = Cosigned By      Initials Name Provider Type    Tita Escalera PTA Physical Therapist Assistant                   Outcome Measures       Row Name 01/19/25 1217          How much help from another person do you currently need...    Turning from your back to your side while in flat bed without using bedrails? 3  -EB     Moving from lying on back to sitting on the side of a flat bed without bedrails? 3  -EB     Moving to and from a bed to a chair (including a  wheelchair)? 3  -EB     Standing up from a chair using your arms (e.g., wheelchair, bedside chair)? 2  -EB     Climbing 3-5 steps with a railing? 1  -EB     To walk in hospital room? 1  -EB     AM-PAC 6 Clicks Score (PT) 13  -EB               User Key  (r) = Recorded By, (t) = Taken By, (c) = Cosigned By      Initials Name Provider Type    EB Tita Power PTA Physical Therapist Assistant                                 Physical Therapy Education       Title: PT OT SLP Therapies (In Progress)       Topic: Physical Therapy (In Progress)       Point: Mobility training (Done)       Learning Progress Summary            Patient Acceptance, E,D, VU,NR by EB at 1/19/2025 1217    Acceptance, E, VU,NR by  at 1/16/2025 1821                      Point: Home exercise program (Not Started)       Learner Progress:  Not documented in this visit.              Point: Body mechanics (Done)       Learning Progress Summary            Patient Acceptance, E,D, VU,NR by EB at 1/19/2025 1217    Acceptance, E, VU,NR by MG at 1/16/2025 1821                      Point: Precautions (Done)       Learning Progress Summary            Patient Acceptance, E, VU,NR by MG at 1/16/2025 1821                                      User Key       Initials Effective Dates Name Provider Type Discipline     05/24/22 -  Melani Moctezuma, PT Physical Therapist PT     02/14/23 -  Ttia Power PTA Physical Therapist Assistant PT                  PT Recommendation and Plan     Progress: improving  Outcome Evaluation: Pt seen for PT tx today. Pt eager to get up to the chair. Pt required increased time with mobility but able to complete supine to sit with SBA and stood from EOB to walker with Vaughn/ModA. Pt with forward flexed posture which she has at baseline. Pt was able to stand and take a couple of steps to the chair with CGA/Vaughn while pt using the walker. No unsteadiness or LOB during transfer. Will continue to follow and progress pt as able.     Time  Calculation:         PT Charges       Row Name 01/19/25 1217             Time Calculation    Start Time 1057  -EB      Stop Time 1114  -EB      Time Calculation (min) 17 min  -EB      PT Received On 01/19/25  -EB      PT - Next Appointment 01/20/25  -EB         Time Calculation- PT    Total Timed Code Minutes- PT 17 minute(s)  -EB                User Key  (r) = Recorded By, (t) = Taken By, (c) = Cosigned By      Initials Name Provider Type    EB Tita Power PTA Physical Therapist Assistant                  Therapy Charges for Today       Code Description Service Date Service Provider Modifiers Qty    [id number]  PT THERAPEUTIC ACT EA 15 MIN 1/19/2025 Tita Power PTA GP 1            PT G-Codes  Outcome Measure Options: AM-PAC 6 Clicks Daily Activity (OT)  AM-PAC 6 Clicks Score (PT): 13  AM-PAC 6 Clicks Score (OT): 24       Tita Power PTA  1/19/2025

## 2025-01-19 NOTE — PLAN OF CARE
Goal Outcome Evaluation:  Plan of Care Reviewed With: patient        Progress: improving  Outcome Evaluation: Pt seen for PT tx today. Pt eager to get up to the chair. Pt required increased time with mobility but able to complete supine to sit with SBA and stood from EOB to walker with Vaughn/ModA. Pt with forward flexed posture which she has at baseline. Pt was able to stand and take a couple of steps to the chair with CGA/Vaughn while pt using the walker. No unsteadiness or LOB during transfer. Will continue to follow and progress pt as able.

## 2025-01-19 NOTE — PROGRESS NOTES
"[facility] Clinical Pharmacy Services: Vancomycin Monitoring Note    Halie Guidry is a 84 y.o. female who is on day 5 of pharmacy to dose vancomycin for RLE diabetic ulcer with surrounding cellulitis.    Previous Vancomycin Dose: 500 mg IV once on 1/17 at 1848, intermittent dosing    Updated Cultures and Sensitivities:   1/12: BCx-NGTD  1/16: R leg WCx-Proteus mirabilis (1+), GNB (1+), GPC (1+)    Results from last 7 days   Lab Units 01/19/25  1200 01/18/25  1935 01/17/25  1412 01/16/25  0606   VANCOMYCIN RM mcg/mL 18.90 21.70  --  18.60   VANCOMYCIN TR mcg/mL  --   --  20.10*  --      Vitals/Labs  Ht: 165.1 cm (65\"); Wt: 101 kg (223 lb 12.3 oz)   Temp Readings from Last 1 Encounters:   01/19/25 97.7 °F (36.5 °C) (Oral)     Estimated Creatinine Clearance: 16.3 mL/min (A) (by C-G formula based on SCr of 3.05 mg/dL (H)).     Results from last 7 days   Lab Units 01/19/25  0727 01/18/25  0514 01/17/25  0506   CREATININE mg/dL 3.05* 3.01* 2.61*   WBC 10*3/mm3 8.82 8.96 9.94     Assessment/Plan    Random level at noon came back at 18.9 mcg/mL, which is within the goal level range of 15-20 mcg/mL. Will re-dose vancomycin at 500 mg IV once scheduled at 1500.  Next Level Date and Time: Vanc Random has been ordered for tomorrow afternoon 1/20 at 1400 (24 hr level).  We will continue to monitor patient changes and renal function until vancomycin is discontinued or patient is discharged-SCr has plateaued today at 3.05. BMP has been ordered daily to trend renal function while the patient is on vancomycin.     Thank you for involving pharmacy in this patient's care. Please contact pharmacy with any questions or concerns.       Lizz Hernández, Pharm.D., [facility]   Clinical Pharmacist  "

## 2025-01-19 NOTE — PROGRESS NOTES
Name: Halie Guidry ADMIT: 2025   : 1940  PCP: Napoleon Mead MD    MRN: [id number] LOS: 3 days   AGE/SEX: 84 y.o. female  ROOM: [facility]     Subjective   Subjective   No acute events. Patient denies new complaints. No family at bedside.     Objective   Objective   Vital Signs  Temp:  [97.5 °F (36.4 °C)-98.4 °F (36.9 °C)] 97.7 °F (36.5 °C)  Heart Rate:  [60-73] 63  Resp:  [18] 18  BP: (140-184)/(45-69) 165/45  SpO2:  [90 %-97 %] 90 %  on   ;   Device (Oxygen Therapy): room air  Body mass index is 37.24 kg/m².  Physical Exam  Vitals and nursing note reviewed.   HENT:      Head: Normocephalic and atraumatic.      Nose: Nose normal.      Mouth/Throat:      Mouth: Mucous membranes are moist.      Pharynx: Oropharynx is clear.   Eyes:      Conjunctiva/sclera: Conjunctivae normal.      Pupils: Pupils are equal, round, and reactive to light.   Cardiovascular:      Rate and Rhythm: Normal rate and regular rhythm.      Pulses: Normal pulses.   Pulmonary:      Effort: Pulmonary effort is normal.   Abdominal:      General: Bowel sounds are normal. There is no distension.      Palpations: Abdomen is soft.      Tenderness: There is no abdominal tenderness.   Musculoskeletal:         General: Swelling and tenderness present.      Cervical back: Neck supple.   Skin:     General: Skin is warm and dry.      Capillary Refill: Capillary refill takes less than 2 seconds.      Findings: Erythema (BLE, improving) present.   Neurological:      General: No focal deficit present.      Mental Status: She is alert and oriented to person, place, and time.   Psychiatric:         Mood and Affect: Mood normal.         Behavior: Behavior normal.       Results Review     I reviewed the patient's new clinical results.  Results from last 7 days   Lab Units 25  0727 25  0514 25  0506 25  0606   WBC 10*3/mm3 8.82 8.96 9.94 8.66   HEMOGLOBIN g/dL 9.4* 9.0* 9.0* 8.3*   PLATELETS 10*3/mm3 291 297 285 284      Results from last 7 days   Lab Units 01/19/25  0727 01/18/25  0514 01/17/25  0506 01/16/25  0606   SODIUM mmol/L 141 139 142 144   POTASSIUM mmol/L 3.9 3.6 3.8 3.8   CHLORIDE mmol/L 103 103 106 109*   CO2 mmol/L 22.0 18.4* 20.4* 19.6*   BUN mg/dL 67* 54* 52* 45*   CREATININE mg/dL 3.05* 3.01* 2.61* 2.61*   GLUCOSE mg/dL 224* 160* 168* 127*   EGFR mL/min/1.73 14.6* 14.8* 17.6* 17.6*     Results from last 7 days   Lab Units 01/15/25  0556 01/14/25 2002   ALBUMIN g/dL 3.3* 3.2*   BILIRUBIN mg/dL 0.2 <0.2   ALK PHOS U/L 138* 129*   AST (SGOT) U/L 10 13   ALT (SGPT) U/L 6 5     Results from last 7 days   Lab Units 01/19/25  0727 01/18/25  0514 01/17/25  0506 01/16/25  0606 01/15/25  0556 01/14/25 2002   CALCIUM mg/dL 7.8* 7.8* 8.0* 8.1* 8.4* 7.7*   ALBUMIN g/dL  --   --   --   --  3.3* 3.2*   MAGNESIUM mg/dL  --   --   --   --   --  1.5*   PHOSPHORUS mg/dL  --   --   --   --   --  4.7*     Results from last 7 days   Lab Units 01/14/25 2002   PROCALCITONIN ng/mL 0.11   LACTATE mmol/L 1.3     Glucose   Date/Time Value Ref Range Status   01/19/2025 1140 231 (H) 70 - 130 mg/dL Final   01/19/2025 0557 256 (H) 70 - 130 mg/dL Final   01/18/2025 2203 166 (H) 70 - 130 mg/dL Final   01/18/2025 2008 281 (H) 70 - 130 mg/dL Final   01/18/2025 1633 271 (H) 70 - 130 mg/dL Final   01/18/2025 1122 252 (H) 70 - 130 mg/dL Final   01/18/2025 0540 178 (H) 70 - 130 mg/dL Final       No radiology results for the last day    I have personally reviewed all medications:  Scheduled Medications  ammonium lactate, 1 Application, Topical, BID  atorvastatin, 80 mg, Oral, Nightly  bisoprolol, 5 mg, Oral, Daily  cefTAZidime, 2,000 mg, Intravenous, Q24H  enoxaparin, 30 mg, Subcutaneous, Nightly  fluticasone, 2 spray, Nasal, Daily  hydrocortisone-bacitracin-zinc oxide-nystatin, 1 Application, Topical, Q12H  insulin glargine, 10 Units, Subcutaneous, Once  [START ON 1/20/2025] insulin glargine, 30 Units, Subcutaneous, Daily  insulin lispro, 2-7  Units, Subcutaneous, 4x Daily AC & at Bedtime  insulin lispro, 7 Units, Subcutaneous, TID With Meals  levothyroxine, 137 mcg, Oral, Q AM  pantoprazole, 40 mg, Oral, Q AM  pregabalin, 75 mg, Oral, BID  terazosin, 1 mg, Oral, Nightly  torsemide, 40 mg, Oral, BID  vancomycin, 500 mg, Intravenous, Once  Vancomycin Pharmacy Intermittent/Pulse Dosing, , Not Applicable, Daily    Infusions  Pharmacy to dose vancomycin,     Diet  Diet: Cardiac, Diabetic; Healthy Heart (2-3 Na+); Consistent Carbohydrate; Fluid Consistency: Thin (IDDSI 0)    I have personally reviewed:  [x]  Laboratory   [x]  Microbiology   []  Radiology   [x]  EKG/Telemetry  []  Cardiology/Vascular   []  Pathology    []  Records    Assessment/Plan     Active Hospital Problems    Diagnosis  POA   • **Cellulitis of right lower extremity [L03.115]  Yes   • CKD (chronic kidney disease) stage 4, GFR 15-29 ml/min [N18.4]  Yes   • Pancreatic neoplasm [D49.0]  Yes   • Gastroesophageal reflux disease [K21.9]  Yes   • Type 2 diabetes mellitus with hyperglycemia [E11.65]  Yes   • Severe hypothyroidism [E03.9]  Yes   • 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   RLE Cellulitis  - has chronic RLE wound, diabetic patient  - continue vancomycin and fortaz, blood and wound cx's NGTD  - continue pain control and local wound care     Type 2 DM  - complications include neuropathy and nephropathy  - BG elevated  - increase lantus to 30 units daily and give 10 units now to catch up  - increase lispro to 7 units TID with meals  - continue coverage with ssi/hypoglycemia protocol low dose     CKD Stage 4  - baseline serum creatinine is 2.3-2.4  - monitor with above treatments  - continue torsemide     Hypothyroidism  - continue levothyroxine      Lovenox 30 mg SC daily for  DVT prophylaxis.  Full code.  Discussed with patient and nursing staff.  Anticipate discharge to SNU facility in 2-3 days.  Expected Discharge Date: 1/20/2025; Expected Discharge Time:       Lewis Sheppard MD  [facility]  01/19/25  14:05 EST    Portions of this text have been copied and I have reviewed them. They are accurate as of 1/19/2025

## 2025-01-19 NOTE — PLAN OF CARE
Goal Outcome Evaluation:  Plan of Care Reviewed With: patient        Progress: no change  Outcome Evaluation:       A&O x4. VSS. SR on telemetry. Room air.       Dressing changed to RLE.     Skin care provided to LLE as ordered with Ammonium Lactate.     Skin care provided to folds of abdomen, breasts, and groin.   Antifungal powder applied.       Accuchecks.         Will continue to monitor.

## 2025-01-19 NOTE — PROGRESS NOTES
"[facility] Clinical Pharmacy Services: Vancomycin Monitoring Note    Halie Guidry is a 84 y.o. female who is on day 4 of vancomycin for  RLE diabetic ulver with surrounding cellulitis .    Previous Vancomycin Dose:   intermittent dosing    Results from last 7 days   Lab Units 01/18/25  1935 01/17/25  1412 01/16/25  0606 01/15/25  0556   VANCOMYCIN RM mcg/mL 21.70  --  18.60 20.50   VANCOMYCIN TR mcg/mL  --  20.10*  --   --      Vitals/Labs  Ht: 165.1 cm (65\"); Wt: 101 kg (223 lb 12.3 oz)   Temp Readings from Last 1 Encounters:   01/18/25 98.4 °F (36.9 °C) (Oral)     Estimated Creatinine Clearance: 16.5 mL/min (A) (by C-G formula based on SCr of 3.01 mg/dL (H)).     Results from last 7 days   Lab Units 01/18/25  0514 01/17/25  0506 01/16/25  0606   CREATININE mg/dL 3.01* 2.61* 2.61*   WBC 10*3/mm3 8.96 9.94 8.66     Assessment/Plan    Vancomycin random level elevated.  Defer further dosing/levels to clinical pharmacist to decide once AM BMP results.    Thank you for involving pharmacy in this patient's care. Please contact pharmacy with any questions or concerns.       Calvin Tate III, [facility]  Clinical Pharmacist          "

## 2025-01-20 LAB
ANION GAP SERPL CALCULATED.3IONS-SCNC: 18.3 MMOL/L (ref 5–15)
BASOPHILS # BLD AUTO: 0.13 10*3/MM3 (ref 0–0.2)
BASOPHILS NFR BLD AUTO: 1.4 % (ref 0–1.5)
BUN SERPL-MCNC: 73 MG/DL (ref 8–23)
BUN/CREAT SERPL: 24.9 (ref 7–25)
CALCIUM SPEC-SCNC: 7.9 MG/DL (ref 8.6–10.5)
CHLORIDE SERPL-SCNC: 101 MMOL/L (ref 98–107)
CO2 SERPL-SCNC: 19.7 MMOL/L (ref 22–29)
CREAT SERPL-MCNC: 2.93 MG/DL (ref 0.57–1)
DEPRECATED RDW RBC AUTO: 47 FL (ref 37–54)
EGFRCR SERPLBLD CKD-EPI 2021: 15.3 ML/MIN/1.73
EOSINOPHIL # BLD AUTO: 0.66 10*3/MM3 (ref 0–0.4)
EOSINOPHIL NFR BLD AUTO: 7.2 % (ref 0.3–6.2)
ERYTHROCYTE [DISTWIDTH] IN BLOOD BY AUTOMATED COUNT: 14.8 % (ref 12.3–15.4)
GLUCOSE BLDC GLUCOMTR-MCNC: 241 MG/DL (ref 70–130)
GLUCOSE BLDC GLUCOMTR-MCNC: 268 MG/DL (ref 70–130)
GLUCOSE BLDC GLUCOMTR-MCNC: 273 MG/DL (ref 70–130)
GLUCOSE BLDC GLUCOMTR-MCNC: 284 MG/DL (ref 70–130)
GLUCOSE SERPL-MCNC: 273 MG/DL (ref 65–99)
HCT VFR BLD AUTO: 30.9 % (ref 34–46.6)
HGB BLD-MCNC: 10 G/DL (ref 12–15.9)
IMM GRANULOCYTES # BLD AUTO: 0.05 10*3/MM3 (ref 0–0.05)
IMM GRANULOCYTES NFR BLD AUTO: 0.5 % (ref 0–0.5)
LYMPHOCYTES # BLD AUTO: 1.31 10*3/MM3 (ref 0.7–3.1)
LYMPHOCYTES NFR BLD AUTO: 14.3 % (ref 19.6–45.3)
MCH RBC QN AUTO: 28.3 PG (ref 26.6–33)
MCHC RBC AUTO-ENTMCNC: 32.4 G/DL (ref 31.5–35.7)
MCV RBC AUTO: 87.5 FL (ref 79–97)
MONOCYTES # BLD AUTO: 0.76 10*3/MM3 (ref 0.1–0.9)
MONOCYTES NFR BLD AUTO: 8.3 % (ref 5–12)
NEUTROPHILS NFR BLD AUTO: 6.28 10*3/MM3 (ref 1.7–7)
NEUTROPHILS NFR BLD AUTO: 68.3 % (ref 42.7–76)
NRBC BLD AUTO-RTO: 0 /100 WBC (ref 0–0.2)
PLATELET # BLD AUTO: 334 10*3/MM3 (ref 140–450)
PMV BLD AUTO: 10.1 FL (ref 6–12)
POTASSIUM SERPL-SCNC: 3.8 MMOL/L (ref 3.5–5.2)
RBC # BLD AUTO: 3.53 10*6/MM3 (ref 3.77–5.28)
SODIUM SERPL-SCNC: 139 MMOL/L (ref 136–145)
VANCOMYCIN SERPL-MCNC: 21.6 MCG/ML (ref 5–40)
WBC NRBC COR # BLD AUTO: 9.19 10*3/MM3 (ref 3.4–10.8)

## 2025-01-20 PROCEDURE — 63710000001 INSULIN GLARGINE PER 5 UNITS: Performed by: INTERNAL MEDICINE

## 2025-01-20 PROCEDURE — 25010000002 ENOXAPARIN PER 10 MG: Performed by: NURSE PRACTITIONER

## 2025-01-20 PROCEDURE — 97530 THERAPEUTIC ACTIVITIES: CPT

## 2025-01-20 PROCEDURE — 85025 COMPLETE CBC W/AUTO DIFF WBC: CPT | Performed by: INTERNAL MEDICINE

## 2025-01-20 PROCEDURE — 99222 1ST HOSP IP/OBS MODERATE 55: CPT | Performed by: INTERNAL MEDICINE

## 2025-01-20 PROCEDURE — 80048 BASIC METABOLIC PNL TOTAL CA: CPT | Performed by: INTERNAL MEDICINE

## 2025-01-20 PROCEDURE — 63710000001 INSULIN LISPRO (HUMAN) PER 5 UNITS: Performed by: INTERNAL MEDICINE

## 2025-01-20 PROCEDURE — 80202 ASSAY OF VANCOMYCIN: CPT | Performed by: INTERNAL MEDICINE

## 2025-01-20 PROCEDURE — 25010000002 CEFTAZIDIME 2 G RECONSTITUTED SOLUTION 1 EACH VIAL: Performed by: INTERNAL MEDICINE

## 2025-01-20 PROCEDURE — 82948 REAGENT STRIP/BLOOD GLUCOSE: CPT

## 2025-01-20 RX ORDER — INSULIN LISPRO 100 [IU]/ML
10 INJECTION, SOLUTION INTRAVENOUS; SUBCUTANEOUS
Status: DISCONTINUED | OUTPATIENT
Start: 2025-01-20 | End: 2025-01-21 | Stop reason: HOSPADM

## 2025-01-20 RX ORDER — LEVOFLOXACIN 500 MG/1
500 TABLET, FILM COATED ORAL EVERY OTHER DAY
Status: COMPLETED | OUTPATIENT
Start: 2025-01-20 | End: 2025-01-20

## 2025-01-20 RX ADMIN — PREGABALIN 75 MG: 75 CAPSULE ORAL at 21:46

## 2025-01-20 RX ADMIN — PREGABALIN 75 MG: 75 CAPSULE ORAL at 09:19

## 2025-01-20 RX ADMIN — INSULIN LISPRO 4 UNITS: 100 INJECTION, SOLUTION INTRAVENOUS; SUBCUTANEOUS at 21:45

## 2025-01-20 RX ADMIN — BISOPROLOL FUMARATE 5 MG: 5 TABLET ORAL at 09:19

## 2025-01-20 RX ADMIN — LEVOTHYROXINE SODIUM 137 MCG: 0.03 TABLET ORAL at 06:51

## 2025-01-20 RX ADMIN — ZINC OXIDE 1 APPLICATION: 200 OINTMENT TOPICAL at 09:20

## 2025-01-20 RX ADMIN — CEFTAZIDIME 2000 MG: 2 INJECTION, POWDER, FOR SOLUTION INTRAVENOUS at 09:18

## 2025-01-20 RX ADMIN — LEVOFLOXACIN 500 MG: 500 TABLET, FILM COATED ORAL at 21:51

## 2025-01-20 RX ADMIN — INSULIN LISPRO 4 UNITS: 100 INJECTION, SOLUTION INTRAVENOUS; SUBCUTANEOUS at 13:13

## 2025-01-20 RX ADMIN — ENOXAPARIN SODIUM 30 MG: 100 INJECTION SUBCUTANEOUS at 21:46

## 2025-01-20 RX ADMIN — PANTOPRAZOLE SODIUM 40 MG: 40 TABLET, DELAYED RELEASE ORAL at 06:51

## 2025-01-20 RX ADMIN — TORSEMIDE 40 MG: 20 TABLET ORAL at 21:46

## 2025-01-20 RX ADMIN — INSULIN LISPRO 4 UNITS: 100 INJECTION, SOLUTION INTRAVENOUS; SUBCUTANEOUS at 09:19

## 2025-01-20 RX ADMIN — INSULIN LISPRO 10 UNITS: 100 INJECTION, SOLUTION INTRAVENOUS; SUBCUTANEOUS at 17:53

## 2025-01-20 RX ADMIN — ACETAMINOPHEN 650 MG: 325 TABLET, FILM COATED ORAL at 09:23

## 2025-01-20 RX ADMIN — INSULIN LISPRO 7 UNITS: 100 INJECTION, SOLUTION INTRAVENOUS; SUBCUTANEOUS at 09:19

## 2025-01-20 RX ADMIN — INSULIN LISPRO 7 UNITS: 100 INJECTION, SOLUTION INTRAVENOUS; SUBCUTANEOUS at 12:30

## 2025-01-20 RX ADMIN — Medication 1 APPLICATION: at 09:20

## 2025-01-20 RX ADMIN — Medication 1 APPLICATION: at 21:52

## 2025-01-20 RX ADMIN — TORSEMIDE 40 MG: 20 TABLET ORAL at 09:19

## 2025-01-20 RX ADMIN — INSULIN LISPRO 3 UNITS: 100 INJECTION, SOLUTION INTRAVENOUS; SUBCUTANEOUS at 17:52

## 2025-01-20 RX ADMIN — TERAZOSIN HYDROCHLORIDE 1 MG: 1 CAPSULE ORAL at 21:46

## 2025-01-20 RX ADMIN — GUAIFENESIN 200 MG: 100 LIQUID ORAL at 21:57

## 2025-01-20 RX ADMIN — FLUTICASONE PROPIONATE 2 SPRAY: 50 SPRAY, METERED NASAL at 09:20

## 2025-01-20 RX ADMIN — ZINC OXIDE 1 APPLICATION: 200 OINTMENT TOPICAL at 21:52

## 2025-01-20 RX ADMIN — GUAIFENESIN 200 MG: 100 LIQUID ORAL at 09:51

## 2025-01-20 RX ADMIN — INSULIN GLARGINE 30 UNITS: 100 INJECTION, SOLUTION SUBCUTANEOUS at 09:20

## 2025-01-20 RX ADMIN — ATORVASTATIN CALCIUM 80 MG: 80 TABLET, FILM COATED ORAL at 21:46

## 2025-01-20 RX ADMIN — ACETAMINOPHEN 650 MG: 325 TABLET, FILM COATED ORAL at 21:51

## 2025-01-20 NOTE — CASE MANAGEMENT/SOCIAL WORK
"Physicians Statement of Medical Necessity for  Ambulance Transportation    GENERAL INFORMATION     Name: Halie Guidry  YOB: 1940  Medicare #: [id number]  Transport Date:    (Valid for round trips this date, or for scheduled repetitive trips for 60 days from the date signed below.)  Origin: [facility]  Destination: [facility]  Is the Patient's stay covered under Medicare Part A (PPS/DRG?)Yes  Closest appropriate facility? Yes  If this a hosp-hosp transfer? No  Is this a hospice patient? No    MEDICAL NECESSITY QUESTIONAIRE    Ambulance Transportation is medically necessary only if other means of transportation are contraindicated or would be potentially harmful to the patient.  To meet this requirement, the patient must be either \"bed confined\" or suffer from a condition such that transport by means other than an ambulance is contraindicated by the patient's condition.  The following questions must be answered by the healthcare professional signing below for this form to be valid:     1) Describe the MEDICAL CONDITION (physical and/or mental) of this patient AT THE TIME OF AMBULANCE TRANSPORT that requires the patient to be transported in an ambulance, and why transport by other means is contraindicated by the patient's condition: unable to transfer, weak, pain  Past Medical History:   Diagnosis Date    Acute metabolic encephalopathy 06/24/2022    Anxiety     Arthritis     Benign essential hypertension 08/20/2014    Bleeding disorder     Depression     Diabetes     Diabetes mellitus, type 2     Disc degeneration, lumbar     Headache, tension-type     Hyperlipidemia     Hypothyroidism     Neuropathy     Osteoporosis 09/09/2015    Pancreatic mass     Sept 2023, on Monthly Octreotide Injection    Peripheral neuropathy     Rotator cuff tear, left     Scoliosis     Shoulder pain     LEFT, TORN ROTATOR CUFF S/P FALL    Spinal stenosis       Past Surgical History:   Procedure Laterality Date    APPENDECTOMY  " "    BILATERAL BREAST REDUCTION Bilateral 08/2015    CATARACT EXTRACTION  03/2015    CHOLECYSTECTOMY WITH INTRAOPERATIVE CHOLANGIOGRAM N/A 3/27/2022    Procedure: CHOLECYSTECTOMY LAPAROSCOPIC INTRAOPERATIVE CHOLANGIOGRAM;  Surgeon: Aiyana Hill MD;  Location: [facility] OR;  Service: General;  Laterality: N/A;    COLONOSCOPY  06/05/2015    WNL    ERCP N/A 2/25/2022    Procedure: ENDOSCOPIC RETROGRADE CHOLANGIOPANCREATOGRAPHY with sphincterotomy and balloon sweep;  Surgeon: Chilo Wilhelm MD;  Location: [facility] ENDOSCOPY;  Service: Gastroenterology;  Laterality: N/A;  PRE/POST - CBD stones    ERCP N/A 3/28/2022    Procedure: ENDOSCOPIC RETROGRADE CHOLANGIOPANCREATOGRAPHY WITH SPHINCTEROTOMY AND BALLOON SWEEP;  Surgeon: Chilo Wilhelm MD;  Location: [facility] ENDOSCOPY;  Service: Gastroenterology;  Laterality: N/A;  PRE: COMMON DUCT STONE  POST: COMMON DUCT STONE    KYPHOPLASTY      REDUCTION MAMMAPLASTY      TONSILLECTOMY        2) Is this patient \"bed confined\" as defined below?Yes   To be \"bed confined\" the patient must satisfy all three of the following criteria:  (1) unable to get up from bed without assistance; AND (2) unable to ambulate;  AND (3) unable to sit in a chair or wheelchair.  3) Can this patient safely be transported by car or wheelchair van (I.e., may safely sit during transport, without an attendant or monitoring?)No   4. In addition to completing questions 1-3 above, please check any of the following conditions that apply*:          *Note: supporting documentation for any boxes checked must be maintained in the patient's medical records Moderate/severe pain on movement and Unable to tolerate seated position for time needed to transport      SIGNATURE OF PHYSICIAN OR OTHER AUTHORIZED HEALTHCARE PROFESSIONAL    I certify that the above information is true and correct based on my evaluation of this patient, and represent that the patient requires transport by ambulance and that other " forms of transport are contraindicated.  I understand that this information will be used by the Centers for Medicare and Medicaid Services (CMS) to support the determiniation of medical necessity for ambulance services, and I represent that I have personal knowledge of the patient's condition at the time of transport.       If this box is checked, I also certify that the patient is physically or mentally incapable of signing the ambulance service's claim form and that the institution with which I am affiliated has furnished care, services or assistance to the patient.  My signature below is made on behalf of the patient pursuant to 42 .36(b)(4). In accordance with 42 .37, the specific reason(s) that the patient is physically or mentally incapable of signing the claim for is as follows:     Signature of Physician or Healthcare Professional  Date/Time:        (For Scheduled repetitive transport, this form is not valid for transports performed more than 60 days after this date).                                                                                                                                            --------------------------------------------------------------------------------------------  Printed Name and Credentials of Physician or Authorized Healthcare Professional     *Form must be signed by patient's attending physician for scheduled, repetitive transports,.  For non-repetitive ambulance transports, if unable to obtain the signature of the attending physician, any of the following may sign (please select below):     Physician  Clinical Nurse Specialist  Registered Nurse     Physician Assistant  Discharge Planner  Licensed Practical Nurse     Nurse Practitioner

## 2025-01-20 NOTE — PROGRESS NOTES
"[facility] Clinical Pharmacy Services: Vancomycin Monitoring Note    Halie Guidry is a 84 y.o. female who is on day 5 of pharmacy to dose vancomycin for RLE diabetic ulcer with surrounding cellulitis.    Previous Vancomycin Dose: 500 mg IV once on 1/17 at 1848, intermittent dosing    Updated Cultures and Sensitivities:   1/12: BCx-NGTD  1/16: R leg WCx-Proteus mirabilis (1+), GNB (1+), GPC (1+)    Results from last 7 days   Lab Units 01/19/25  1200 01/18/25  1935 01/17/25  1412 01/16/25  0606   VANCOMYCIN RM mcg/mL 18.90 21.70  --  18.60   VANCOMYCIN TR mcg/mL  --   --  20.10*  --      Vitals/Labs  Ht: 165.1 cm (65\"); Wt: 101 kg (223 lb 12.3 oz)   Temp Readings from Last 1 Encounters:   01/20/25 97.7 °F (36.5 °C) (Oral)     Estimated Creatinine Clearance: 16.9 mL/min (A) (by C-G formula based on SCr of 2.93 mg/dL (H)).     Results from last 7 days   Lab Units 01/20/25  0611 01/19/25  0727 01/18/25  0514   CREATININE mg/dL 2.93* 3.05* 3.01*   WBC 10*3/mm3 9.19 8.82 8.96     Assessment/Plan    Vancomycin stopped, converted to levofloxacin x 3 days per ID.  No further need for therapeutic drug monitoring.  Convert levofloxacin 250mg QOD to 500mg QOD per protocol for preferred HD dosing.      Pharmacy will sign off.  Thank you for involving pharmacy in this patient's care. Please contact pharmacy with any questions or concerns.       Geri Whitmore, PharmD, MPH, BCPS    "

## 2025-01-20 NOTE — CASE MANAGEMENT/SOCIAL WORK
Continued Stay Note  [facility]     Patient Name: Halie Guidry  MRN: [id number]  Today's Date: 1/20/2025    Admit Date: 1/14/2025    Plan: SNF at [place] pending precert approval   Discharge Plan       Row Name 01/20/25 1455       Plan    Plan SNF at [place] pending precert approval    Patient/Family in Agreement with Plan yes    Plan Comments Spoke with [facility], following for bed availability at [place] when pt ready. Spoke with Dr. Sheppard, plan for dc tomorrow pending ID recommendations. ID has given po recs and s/o, plan for dc tomorrow. Pt will need precert, emailed post acute auths to start. Pharmacy updated, pkt to erlinda with EMS form, requested ems for tomorrow afternoon. Pt to update family, CCP will follow - Savita CORDON                   Discharge Codes    No documentation.                 Expected Discharge Date and Time       Expected Discharge Date Expected Discharge Time    Jan 21, 2025               Savita Gomez RN

## 2025-01-20 NOTE — PLAN OF CARE
Problem: Adult Inpatient Plan of Care  Goal: Plan of Care Review  Outcome: Progressing  Flowsheets (Taken 1/20/2025 1225)  Progress: improving  Outcome Evaluation: Patient is alert and oriented x4, VSS on room air and SR w/ 1st degree AV block and BBB. Up with 1 assist. Consult to ID today. Dressing changed for lower extremity this am by nightshift RN. PRN tylenol and robitussin given per patient request. Bed alarm on, bed in lowest position, and call light within reach.  Plan of Care Reviewed With: patient  Goal: Patient-Specific Goal (Individualized)  Outcome: Progressing  Flowsheets (Taken 1/20/2025 1225)  Patient/Family-Specific Goals (Include Timeframe): Patient will have adequate pain control throughout the shift.  Individualized Care Needs: Optimize comfort  Anxieties, Fears or Concerns: None  Goal: Absence of Hospital-Acquired Illness or Injury  Outcome: Progressing  Intervention: Identify and Manage Fall Risk  Recent Flowsheet Documentation  Taken 1/20/2025 1015 by Holly Matthew RN  Safety Promotion/Fall Prevention:   activity supervised   assistive device/personal items within reach   clutter free environment maintained   fall prevention program maintained   lighting adjusted   nonskid shoes/slippers when out of bed   room organization consistent   safety round/check completed   toileting scheduled  Taken 1/20/2025 0815 by Holly Matthew RN  Safety Promotion/Fall Prevention:   activity supervised   assistive device/personal items within reach   clutter free environment maintained   fall prevention program maintained   lighting adjusted   nonskid shoes/slippers when out of bed   room organization consistent   safety round/check completed   toileting scheduled  Intervention: Prevent Skin Injury  Recent Flowsheet Documentation  Taken 1/20/2025 1015 by Holly Matthew RN  Body Position:   position changed independently   weight shifting   sitting up in bed  Taken 1/20/2025 0915 by Holly Matthew RN  Skin  Protection:   incontinence pads utilized   transparent dressing maintained  Taken 1/20/2025 0815 by Holly Matthew RN  Body Position:   position changed independently   weight shifting   right   tilted  Intervention: Prevent and Manage VTE (Venous Thromboembolism) Risk  Recent Flowsheet Documentation  Taken 1/20/2025 0915 by Holly Matthew RN  VTE Prevention/Management:   bilateral   SCDs (sequential compression devices) off   patient refused intervention  Intervention: Prevent Infection  Recent Flowsheet Documentation  Taken 1/20/2025 1015 by Holly Matthew RN  Infection Prevention:   environmental surveillance performed   equipment surfaces disinfected   hand hygiene promoted   personal protective equipment utilized   rest/sleep promoted   single patient room provided  Taken 1/20/2025 0815 by Holly Matthew RN  Infection Prevention:   environmental surveillance performed   equipment surfaces disinfected   hand hygiene promoted   personal protective equipment utilized   rest/sleep promoted   single patient room provided  Goal: Optimal Comfort and Wellbeing  Outcome: Progressing  Intervention: Monitor Pain and Promote Comfort  Recent Flowsheet Documentation  Taken 1/20/2025 0923 by Holly Matthew RN  Pain Management Interventions: pain medication given  Intervention: Provide Person-Centered Care  Recent Flowsheet Documentation  Taken 1/20/2025 0915 by Holly Matthew RN  Trust Relationship/Rapport:   care explained   choices provided   emotional support provided   empathic listening provided   questions answered   questions encouraged   reassurance provided   thoughts/feelings acknowledged  Goal: Readiness for Transition of Care  Outcome: Progressing     Problem: Skin Injury Risk Increased  Goal: Skin Health and Integrity  Outcome: Progressing  Intervention: Optimize Skin Protection  Recent Flowsheet Documentation  Taken 1/20/2025 1015 by Holly Matthew RN  Activity Management: activity encouraged  Head of Bed (HOB)  Positioning: HOB at 30-45 degrees  Taken 1/20/2025 0915 by Holly Matthew RN  Pressure Reduction Techniques:   frequent weight shift encouraged   weight shift assistance provided   heels elevated off bed   positioned off wounds  Pressure Reduction Devices:   positioning supports utilized   specialty bed utilized  Skin Protection:   incontinence pads utilized   transparent dressing maintained  Taken 1/20/2025 0815 by Holly Matthew RN  Activity Management: activity encouraged  Head of Bed (HOB) Positioning: HOB at 30 degrees     Problem: Violence Risk or Actual  Goal: Anger and Impulse Control  Outcome: Progressing  Intervention: Minimize Safety Risk  Recent Flowsheet Documentation  Taken 1/20/2025 1015 by Holly Matthew RN  Enhanced Safety Measures: bed alarm set  Taken 1/20/2025 0815 by Holly Matthew RN  Enhanced Safety Measures: bed alarm set  Intervention: Promote Self-Control  Recent Flowsheet Documentation  Taken 1/20/2025 0915 by Holly Matthew RN  Supportive Measures: active listening utilized     Problem: Fall Injury Risk  Goal: Absence of Fall and Fall-Related Injury  Outcome: Progressing  Intervention: Promote Injury-Free Environment  Recent Flowsheet Documentation  Taken 1/20/2025 1015 by Holly Matthew RN  Safety Promotion/Fall Prevention:   activity supervised   assistive device/personal items within reach   clutter free environment maintained   fall prevention program maintained   lighting adjusted   nonskid shoes/slippers when out of bed   room organization consistent   safety round/check completed   toileting scheduled  Taken 1/20/2025 0815 by Holly Matthew RN  Safety Promotion/Fall Prevention:   activity supervised   assistive device/personal items within reach   clutter free environment maintained   fall prevention program maintained   lighting adjusted   nonskid shoes/slippers when out of bed   room organization consistent   safety round/check completed   toileting scheduled   Goal Outcome  Evaluation:  Plan of Care Reviewed With: patient        Progress: improving  Outcome Evaluation: Patient is alert and oriented x4, VSS on room air and SR w/ 1st degree AV block and BBB. Up with 1 assist. Consult to ID today. Dressing changed for lower extremity this am by nightshift RN. PRN tylenol and robitussin given per patient request. Bed alarm on, bed in lowest position, and call light within reach.

## 2025-01-20 NOTE — THERAPY TREATMENT NOTE
Patient Name: Halie Guidry  : 1940    MRN: [id number]                              Today's Date: 2025       Admit Date: 2025    Visit Dx:     ICD-10-CM ICD-9-CM   1. Cellulitis of right leg  L03.115 682.6   2. Ulcer of right lower extremity, unspecified ulcer stage  L97.919 707.10   3. Hypoglycemia  E16.2 251.2   4. Bilateral lower extremity edema  R60.0 782.3   5. Decreased activities of daily living (ADL)  Z78.9 V49.89     Patient Active Problem List   Diagnosis    Compression fracture    Lumbar degenerative disc disease    Type 2 diabetes mellitus with hyperglycemia, with long-term current use of insulin    Hyperlipidemia    Benign essential hypertension    Primary hypothyroidism    Neuropathy    Osteoporosis    Proteinuria    Tobacco abuse    Generalized weakness    Spinal stenosis    Scoliosis    Arthritis    VBI (vertebrobasilar insufficiency)    Orthostatic hypotension    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    Severe hypothyroidism    Noncompliance with medication regimen    Vitamin D deficiency disease    Tremor    Seizure    Thoracic degenerative disc disease    Acute kidney injury (VIPUL) with acute tubular necrosis (ATN)    Hyperglycemia    Type 2 diabetes mellitus with hyperglycemia    Lower abdominal pain    Transaminitis    Emphysematous cystitis    Choledocholithiasis    Hypokalemia    Hypoxia    Generalized abdominal pain    History of Clostridium difficile infection    History of ERCP    Hypomagnesemia    Anxiety disorder    Neuropathic pain    Intertrigo    Weakness of both lower extremities    Accelerated hypertension    Acute cystitis without hematuria    Left lower lobe pneumonia    Cellulitis and abscess of left lower extremity    Benign hypertension with CKD (chronic kidney disease) stage IV    Peripheral edema    Primary malignant neuroendocrine tumor of pancreas    Encounter for long-term (current) use of high-risk medication    Diabetic foot ulcer     CKD (chronic kidney disease) stage 4, GFR 15-29 ml/min    Pressure injury of buttock, stage 2    HTN (hypertension)    Anemia due to chronic kidney disease    Bilateral lower extremity edema    Cellulitis of right lower extremity    CKD (chronic kidney disease) stage 4, GFR 15-29 ml/min    Acute CHF    Bilateral cellulitis of lower leg    Acute UTI    UTI (urinary tract infection)    Acute UTI (urinary tract infection)    Metabolic acidosis    Cobalamin deficiency    Dependent edema    Gastroesophageal reflux disease    Mild neurocognitive disorder    Pancreatic neoplasm    Congestive heart failure    CHF exacerbation    Acute exacerbation of CHF (congestive heart failure)     Past Medical History:   Diagnosis Date    Acute metabolic encephalopathy 06/24/2022    Anxiety     Arthritis     Benign essential hypertension 08/20/2014    Bleeding disorder     Depression     Diabetes     Diabetes mellitus, type 2     Disc degeneration, lumbar     Headache, tension-type     Hyperlipidemia     Hypothyroidism     Neuropathy     Osteoporosis 09/09/2015    Pancreatic mass     Sept 2023, on Monthly Octreotide Injection    Peripheral neuropathy     Rotator cuff tear, left     Scoliosis     Shoulder pain     LEFT, TORN ROTATOR CUFF S/P FALL    Spinal stenosis      Past Surgical History:   Procedure Laterality Date    APPENDECTOMY      BILATERAL BREAST REDUCTION Bilateral 08/2015    CATARACT EXTRACTION  03/2015    CHOLECYSTECTOMY WITH INTRAOPERATIVE CHOLANGIOGRAM N/A 3/27/2022    Procedure: CHOLECYSTECTOMY LAPAROSCOPIC INTRAOPERATIVE CHOLANGIOGRAM;  Surgeon: Aiyana Hill MD;  Location: [facility] MAIN OR;  Service: General;  Laterality: N/A;    COLONOSCOPY  06/05/2015    WNL    ERCP N/A 2/25/2022    Procedure: ENDOSCOPIC RETROGRADE CHOLANGIOPANCREATOGRAPHY with sphincterotomy and balloon sweep;  Surgeon: Chilo Wilhelm MD;  Location: [facility] ENDOSCOPY;  Service: Gastroenterology;  Laterality: N/A;  PRE/POST - CBD stones     ERCP N/A 3/28/2022    Procedure: ENDOSCOPIC RETROGRADE CHOLANGIOPANCREATOGRAPHY WITH SPHINCTEROTOMY AND BALLOON SWEEP;  Surgeon: Chilo Wilhelm MD;  Location: [facility] ENDOSCOPY;  Service: Gastroenterology;  Laterality: N/A;  PRE: COMMON DUCT STONE  POST: COMMON DUCT STONE    KYPHOPLASTY      REDUCTION MAMMAPLASTY      TONSILLECTOMY        General Information       Row Name 01/20/25 1628          Physical Therapy Time and Intention    Document Type therapy note (daily note)  -EB     Mode of Treatment individual therapy;physical therapy  -       Row Name 01/20/25 1628          General Information    Patient Profile Reviewed yes  -EB     Existing Precautions/Restrictions fall  -EB       Row Name 01/20/25 1628          Cognition    Orientation Status (Cognition) oriented x 3  -EB       Row Name 01/20/25 1628          Safety Issues/Impairments Affecting Functional Mobility    Impairments Affecting Function (Mobility) balance;endurance/activity tolerance;strength;pain  -EB               User Key  (r) = Recorded By, (t) = Taken By, (c) = Cosigned By      Initials Name Provider Type    EB Tita Power PTA Physical Therapist Assistant                   Mobility       Row Name 01/20/25 1629          Bed Mobility    Supine-Sit [place] (Bed Mobility) minimum assist (75% patient effort);verbal cues;nonverbal cues (demo/gesture)  -EB     Assistive Device (Bed Mobility) bed rails;head of bed elevated  -EB     Comment, (Bed Mobility) increased time needed. Pt having increased difficulty getting to EOB today. Pt states she is having more pain in LEs.  Assisted with donning pt shoes at EOB.  -       Row Name 01/20/25 1629          Bed-Chair Transfer    Bed-Chair [place] (Transfers) contact guard;minimum assist (75% patient effort)  -EB     Assistive Device (Bed-Chair Transfers) walker, front-wheeled  -EB     Comment, (Bed-Chair Transfer) stand/pivot bed to bsc then bsc to bed.  -       Row Name 01/20/25 1629           Sit-Stand Transfer    Sit-Stand [place] (Transfers) minimum assist (75% patient effort);moderate assist (50% patient effort)  -EB     Assistive Device (Sit-Stand Transfers) walker, front-wheeled  -EB     Comment, (Sit-Stand Transfer) 2 attempts from EOB, and 1 from bsc. Cues for hand placement. Pt unable to find balance with initial stand and required to sit back on EOB. Able to with second.  -EB               User Key  (r) = Recorded By, (t) = Taken By, (c) = Cosigned By      Initials Name Provider Type    Tita Escalera PTA Physical Therapist Assistant                   Obj/Interventions       Row Name 01/20/25 1631          Balance    Balance Assessment sitting static balance;standing static balance  -EB     Static Sitting Balance standby assist  -EB     Position, Sitting Balance sitting edge of bed  -EB     Static Standing Balance contact guard;minimal assist  -EB     Comment, Balance stood from commode, required assist for toileting needs.  -EB               User Key  (r) = Recorded By, (t) = Taken By, (c) = Cosigned By      Initials Name Provider Type    Tita Escalera PTA Physical Therapist Assistant                   Goals/Plan    No documentation.                  Clinical Impression       Row Name 01/20/25 1632          Pain    Pain Location extremity  -EB     Pain Side/Orientation bilateral;lower  -EB       Row Name 01/20/25 1632          Plan of Care Review    Plan of Care Reviewed With patient  -EB     Progress improving  -EB     Outcome Evaluation Pt seen for PT tx today. Pt agreeable to participate. Pt had increased difficulty with bed mobility and stands today. Pt reports she was having increased kinsey LE pain. Pt required Vaughn with supine to sit and increased time. Pt stood twice from EOB with initial stand pt was unable to gain standing balance and needed to sit back down on EOB. Pt was able to improve with second stand. Pt required Vaughn/ModA for stands with vcs for hand placement  and posture. Pt pivoted to bsc with CGA/Vaughn and then again from bsc to chair. Pt will need SNF at d/c to improve strength, balance, and activity tolerance to return to PLOF and to complete independent safe transfers. Will continue to follow.  -       Row Name 01/20/25 1632          Therapy Assessment/Plan (PT)    Therapy Frequency (PT) 5 times/wk  -       Row Name 01/20/25 1632          Positioning and Restraints    Pre-Treatment Position in bed  -EB     Post Treatment Position chair  -EB     In Chair reclined;call light within reach;encouraged to call for assist;exit alarm on  -EB               User Key  (r) = Recorded By, (t) = Taken By, (c) = Cosigned By      Initials Name Provider Type    Tita Escalera PTA Physical Therapist Assistant                   Outcome Measures       Row Name 01/20/25 1636 01/20/25 0915       How much help from another person do you currently need...    Turning from your back to your side while in flat bed without using bedrails? 3  -EB 3  -TH    Moving from lying on back to sitting on the side of a flat bed without bedrails? 3  -EB 3  -TH    Moving to and from a bed to a chair (including a wheelchair)? 3  -EB 3  -TH    Standing up from a chair using your arms (e.g., wheelchair, bedside chair)? 2  -EB 2  -TH    Climbing 3-5 steps with a railing? 1  -EB 1  -TH    To walk in hospital room? 1  -EB 1  -TH    AM-PAC 6 Clicks Score (PT) 13  -EB 13  -TH              User Key  (r) = Recorded By, (t) = Taken By, (c) = Cosigned By      Initials Name Provider Type    Tita Escalera PTA Physical Therapist Assistant    Holly Mays, RN Registered Nurse                                 Physical Therapy Education       Title: PT OT SLP Therapies (In Progress)       Topic: Physical Therapy (In Progress)       Point: Mobility training (Done)       Learning Progress Summary            Patient Acceptance, E,D, VU,NR by ERMELINDA at 1/20/2025 1636    Acceptance, E,D, VU,NR by ERMELINDA at 1/19/2025 1217     Acceptance, E, VU,NR by MG at 1/16/2025 1821                      Point: Home exercise program (Not Started)       Learner Progress:  Not documented in this visit.              Point: Body mechanics (Done)       Learning Progress Summary            Patient Acceptance, E,D, VU,NR by EB at 1/20/2025 1636    Acceptance, E,D, VU,NR by EB at 1/19/2025 1217    Acceptance, E, VU,NR by MG at 1/16/2025 1821                      Point: Precautions (Done)       Learning Progress Summary            Patient Acceptance, E, VU,NR by MG at 1/16/2025 1821                                      User Key       Initials Effective Dates Name Provider Type Discipline     05/24/22 -  Melani Moctezuma, PT Physical Therapist PT    EB 02/14/23 -  Tita Power PTA Physical Therapist Assistant PT                  PT Recommendation and Plan     Progress: improving  Outcome Evaluation: Pt seen for PT tx today. Pt agreeable to participate. Pt had increased difficulty with bed mobility and stands today. Pt reports she was having increased kinsey LE pain. Pt required Vaughn with supine to sit and increased time. Pt stood twice from EOB with initial stand pt was unable to gain standing balance and needed to sit back down on EOB. Pt was able to improve with second stand. Pt required Vaughn/ModA for stands with vcs for hand placement and posture. Pt pivoted to bsc with CGA/Vaughn and then again from bsc to chair. Pt will need SNF at d/c to improve strength, balance, and activity tolerance to return to PLOF and to complete independent safe transfers. Will continue to follow.     Time Calculation:         PT Charges       Row Name 01/20/25 9670             Time Calculation    Start Time 1506  -EB      Stop Time 1530  -EB      Time Calculation (min) 24 min  -EB      PT Received On 01/20/25  -EB      PT - Next Appointment 01/21/25  -EB         Time Calculation- PT    Total Timed Code Minutes- PT 23 minute(s)  -EB                User Key  (r) = Recorded By, (t) =  Taken By, (c) = Cosigned By      Initials Name Provider Type    EB Tita Power PTA Physical Therapist Assistant                  Therapy Charges for Today       Code Description Service Date Service Provider Modifiers Qty    74996211479 HC PT THERAPEUTIC ACT EA 15 MIN 1/19/2025 Tita Power PTA GP 1    64490537073 HC PT THERAPEUTIC ACT EA 15 MIN 1/20/2025 Tita Power PTA GP 2            PT G-Codes  Outcome Measure Options: AM-PAC 6 Clicks Daily Activity (OT)  AM-PAC 6 Clicks Score (PT): 13  AM-PAC 6 Clicks Score (OT): 24       Tita Power PTA  1/20/2025

## 2025-01-20 NOTE — PLAN OF CARE
Goal Outcome Evaluation:  Plan of Care Reviewed With: patient        Progress: improving  Outcome Evaluation: Pt seen for PT tx today. Pt agreeable to participate. Pt had increased difficulty with bed mobility and stands today. Pt reports she was having increased kinsey LE pain. Pt required Vaughn with supine to sit and increased time. Pt stood twice from EOB with initial stand pt was unable to gain standing balance and needed to sit back down on EOB. Pt was able to improve with second stand. Pt required Vaughn/ModA for stands with vcs for hand placement and posture. Pt pivoted to bsc with CGA/Vaughn and then again from bsc to chair. Pt will need SNF at d/c to improve strength, balance, and activity tolerance to return to PLOF and to complete independent safe transfers. Will continue to follow.

## 2025-01-20 NOTE — CASE MANAGEMENT/SOCIAL WORK
Post-Acute Authorization Submission      Post Acute Pre-Cert Documentation  Request Submitted by Facility - Type:: Hospital  Post-Acute Authorization Type Submitted:: SNF  Date Post Acute Pre-Cert Inititated per Facility: 01/20/25  Date Post Acute Pre-Cert Completed: 01/20/25  Accepting Facility: [place] [facility] Discharge Date Requested: 01/21/25  All Clinicals Submitted?: Yes  Had Accepting Facility at Time of Submission: Yes  Response Communicated to:: , Accepting Facility Liaison, Accepting Facility Auth Department  Authorization Number:: [id number]  Post Acute Pre-Cert Initiated Comment: VALID TO ADMIT [facility] 1/24/25.              Matt Moran, PCT

## 2025-01-20 NOTE — CONSULTS
Referring Provider: Serina Leavitt MD  [address]  [address] [address]  Reason for Consultation: Right lower extremity wound    Subjective   History of present illness: This is a 84-year-old female with neuroendocrine pancreatic tumor, CKD 3, hypertension, hyperlipidemia, bilateral lymphedema and poorly controlled type 2 diabetes who was admitted on January 14 with concerns for right lower extremity wound infection.  Prior to presentation she was on a 5-day course of doxycycline but continued to have increasing erythema and drainage from the right lower extremity wound.  She was empirically started on vancomycin and ceftazidime.  Blood cultures were obtained and are negative to date.  The patient states her right lower extremity significantly improved with resolved erythema and decreased swelling.    Past Medical History:   Diagnosis Date    Anxiety     Arthritis     Benign essential hypertension 08/20/2014    Bleeding disorder     Depression     Diabetes mellitus, type 2     Disc degeneration, lumbar     Headache, tension-type     Hyperlipidemia     Hypothyroidism     Neuropathy     Osteoporosis 09/09/2015    Pancreatic neuroendocrine tumor.     Sept 2023, on Monthly Octreotide Injection    Peripheral neuropathy     Spinal stenosis    CKD 3    Past Surgical History:   Procedure Laterality Date    APPENDECTOMY      BILATERAL BREAST REDUCTION Bilateral 08/2015    CATARACT EXTRACTION  03/2015    CHOLECYSTECTOMY WITH INTRAOPERATIVE CHOLANGIOGRAM N/A 3/27/2022    KYPHOPLASTY      TONSILLECTOMY          reports that she quit smoking about 12 years ago. Her smoking use included cigarettes. She started smoking about 52 years ago. She has a 40 pack-year smoking history. She has never used smokeless tobacco. She reports that she does not drink alcohol and does not use drugs.    family history includes Bone cancer in her mother; Heart disease in her daughter; No Known Problems in her father.    Allergies    Allergen Reactions    Sulfa Antibiotics Unknown - High Severity     Pt says it was a long time ago and I don't remember but it wasn't good.        Medication:  Antibiotics:  Ceftazidime 2 g IV every 24 hours  Vancomycin dosing per pharmacy    Please refer to the medical record for a full medication list    Review of Systems  Pertinent items are noted in HPI, all other systems reviewed and negative    Objective   Vital Signs   Temp:  [97.5 °F (36.4 °C)-98.8 °F (37.1 °C)] 97.7 °F (36.5 °C)  Heart Rate:  [63-73] 73  Resp:  [18] 18  BP: (158-180)/(47-64) 180/64    Physical Exam:   General: In no acute distress  HEENT: Normocephalic, atraumatic, no scleral icterus.   Neck: Supple, trachea is midline  Cardiovascular: Normal rate, regular rhythm, normal S1 and S2, no murmurs, rubs, or gallops   Respiratory: Bibasilar crackles  GI: Abdomen is soft, nontender, nondistended, positive bowel sounds bilaterally  Musculoskeletal: no edema, tenderness or deformity  Skin: No rashes bilateral lower extremity swelling, right slightly greater than left, weeping wounds present without purulence  Extremities: No C/C  Neurological: Alert and oriented, moving all 4 extremities  Psychiatric: Normal mood and affect     Results Review:   I reviewed the patient's new clinical results.  I reviewed the patient's new imaging results and agree with the interpretation.    Lab Results   Component Value Date    WBC 9.19 01/20/2025    HGB 10.0 (L) 01/20/2025    HCT 30.9 (L) 01/20/2025    MCV 87.5 01/20/2025     01/20/2025       Lab Results   Component Value Date    GLUCOSE 273 (H) 01/20/2025    BUN 73 (H) 01/20/2025    CREATININE 2.93 (H) 01/20/2025    EGFRIFNONA 51 (L) 02/28/2022    EGFRIFAFRI 50 (L) 01/18/2021    BCR 24.9 01/20/2025    CO2 19.7 (L) 01/20/2025    CALCIUM 7.9 (L) 01/20/2025    PROTENTOTREF 6.6 08/26/2024    ALBUMIN 3.3 (L) 01/15/2025    LABIL2 0.7 08/26/2024    AST 10 01/15/2025    ALT 6 01/15/2025     Procalcitonin  0.11    Microbiology:  1/16 R leg wound Proteus, stenotrophomonas, Klebsiella, VRE Enterococcus faecalis  1/14 BCx neg x 2    Radiology:  8/26 chest x-ray with no pleural effusions or acute infiltrates    Assessment & Plan   Bilateral lower extremity swelling consistent with stasis dermatitis  Right lower extremity wounds with secondary infection  Leukocytosis  Poorly controlled type 2 diabetes    Currently her physical exam is not concerning for a wound infection but the patient tells me her wounds have improved considerably on vancomycin and ceftazidime.  Given the fact that the above regimen only covered Proteus and possibly stenotrophomonas we will focus our oral therapy on those 2 organisms.  Either way superficial wound cultures offer little benefit in dealing with these kind of infections.  Moving forward aggressive wound care is paramount in the treatment of her right lower extremity wound.    Will discontinue empiric vancomycin and ceftazidime.  The patient has completed a 7-day course of empiric therapy.  Recommend 3 more days of Levaquin 250mg PO QOD (Renal dose) which will cover both Proteus and stenotrophomonas and provide the patient with a total of 10 days of antibiotic therapy    ID will sign off.  Please do not hesitate to call us with further questions or concerns      I discussed the patient's findings and my recommendations with patient and nursing staff

## 2025-01-20 NOTE — PROGRESS NOTES
Name: Halie Guidry ADMIT: 2025   : 1940  PCP: Napoleon Mead MD    MRN: [id number] LOS: 4 days   AGE/SEX: 84 y.o. female  ROOM: [facility]     Subjective   Subjective   No acute events. Patient denies new complaints. No family at bedside.     Objective   Objective   Vital Signs  Temp:  [97.5 °F (36.4 °C)-98.8 °F (37.1 °C)] 97.7 °F (36.5 °C)  Heart Rate:  [63-73] 73  Resp:  [18] 18  BP: (158-180)/(47-64) 180/64  SpO2:  [92 %-97 %] 97 %  on   ;   Device (Oxygen Therapy): room air  Body mass index is 37.24 kg/m².  Physical Exam  Vitals and nursing note reviewed.   HENT:      Head: Normocephalic and atraumatic.      Nose: Nose normal.      Mouth/Throat:      Mouth: Mucous membranes are moist.      Pharynx: Oropharynx is clear.   Eyes:      Conjunctiva/sclera: Conjunctivae normal.      Pupils: Pupils are equal, round, and reactive to light.   Cardiovascular:      Rate and Rhythm: Normal rate and regular rhythm.      Pulses: Normal pulses.   Pulmonary:      Effort: Pulmonary effort is normal.   Abdominal:      General: Bowel sounds are normal. There is no distension.      Palpations: Abdomen is soft.      Tenderness: There is no abdominal tenderness.   Musculoskeletal:         General: Swelling and tenderness present.      Cervical back: Neck supple.   Skin:     General: Skin is warm and dry.      Capillary Refill: Capillary refill takes less than 2 seconds.      Findings: Erythema (BLE, improving) present.   Neurological:      General: No focal deficit present.      Mental Status: She is alert and oriented to person, place, and time.   Psychiatric:         Mood and Affect: Mood normal.         Behavior: Behavior normal.       Results Review     I reviewed the patient's new clinical results.  Results from last 7 days   Lab Units 25  0611 25  0727 25  0514 25  0506   WBC 10*3/mm3 9.19 8.82 8.96 9.94   HEMOGLOBIN g/dL 10.0* 9.4* 9.0* 9.0*   PLATELETS 10*3/mm3 334 291 203 523      Results from last 7 days   Lab Units 01/20/25  0611 01/19/25  0727 01/18/25  0514 01/17/25  0506   SODIUM mmol/L 139 141 139 142   POTASSIUM mmol/L 3.8 3.9 3.6 3.8   CHLORIDE mmol/L 101 103 103 106   CO2 mmol/L 19.7* 22.0 18.4* 20.4*   BUN mg/dL 73* 67* 54* 52*   CREATININE mg/dL 2.93* 3.05* 3.01* 2.61*   GLUCOSE mg/dL 273* 224* 160* 168*   EGFR mL/min/1.73 15.3* 14.6* 14.8* 17.6*     Results from last 7 days   Lab Units 01/15/25  0556 01/14/25 2002   ALBUMIN g/dL 3.3* 3.2*   BILIRUBIN mg/dL 0.2 <0.2   ALK PHOS U/L 138* 129*   AST (SGOT) U/L 10 13   ALT (SGPT) U/L 6 5     Results from last 7 days   Lab Units 01/20/25  0611 01/19/25  0727 01/18/25  0514 01/17/25  0506 01/16/25  0606 01/15/25  0556 01/14/25 2002   CALCIUM mg/dL 7.9* 7.8* 7.8* 8.0*   < > 8.4* 7.7*   ALBUMIN g/dL  --   --   --   --   --  3.3* 3.2*   MAGNESIUM mg/dL  --   --   --   --   --   --  1.5*   PHOSPHORUS mg/dL  --   --   --   --   --   --  4.7*    < > = values in this interval not displayed.     Results from last 7 days   Lab Units 01/14/25 2002   PROCALCITONIN ng/mL 0.11   LACTATE mmol/L 1.3     Glucose   Date/Time Value Ref Range Status   01/20/2025 0613 273 (H) 70 - 130 mg/dL Final   01/19/2025 2047 222 (H) 70 - 130 mg/dL Final   01/19/2025 1716 232 (H) 70 - 130 mg/dL Final   01/19/2025 1140 231 (H) 70 - 130 mg/dL Final   01/19/2025 0557 256 (H) 70 - 130 mg/dL Final   01/18/2025 2203 166 (H) 70 - 130 mg/dL Final   01/18/2025 2008 281 (H) 70 - 130 mg/dL Final       No radiology results for the last day    I have personally reviewed all medications:  Scheduled Medications  ammonium lactate, 1 Application, Topical, BID  atorvastatin, 80 mg, Oral, Nightly  bisoprolol, 5 mg, Oral, Daily  cefTAZidime, 2,000 mg, Intravenous, Q24H  enoxaparin, 30 mg, Subcutaneous, Nightly  fluticasone, 2 spray, Nasal, Daily  hydrocortisone-bacitracin-zinc oxide-nystatin, 1 Application, Topical, Q12H  insulin glargine, 30 Units, Subcutaneous, Daily  insulin  lispro, 2-7 Units, Subcutaneous, 4x Daily AC & at Bedtime  insulin lispro, 7 Units, Subcutaneous, TID With Meals  levothyroxine, 137 mcg, Oral, Q AM  pantoprazole, 40 mg, Oral, Q AM  pregabalin, 75 mg, Oral, BID  terazosin, 1 mg, Oral, Nightly  torsemide, 40 mg, Oral, BID  Vancomycin Pharmacy Intermittent/Pulse Dosing, , Not Applicable, Daily    Infusions  Pharmacy to dose vancomycin,     Diet  Diet: Cardiac, Diabetic; Healthy Heart (2-3 Na+); Consistent Carbohydrate; Fluid Consistency: Thin (IDDSI 0)    I have personally reviewed:  [x]  Laboratory   [x]  Microbiology   []  Radiology   [x]  EKG/Telemetry  []  Cardiology/Vascular   []  Pathology    []  Records    Assessment/Plan     Active Hospital Problems    Diagnosis  POA   • **Cellulitis of right lower extremity [L03.115]  Yes   • CKD (chronic kidney disease) stage 4, GFR 15-29 ml/min [N18.4]  Yes   • Pancreatic neoplasm [D49.0]  Yes   • Gastroesophageal reflux disease [K21.9]  Yes   • Type 2 diabetes mellitus with hyperglycemia [E11.65]  Yes   • Severe hypothyroidism [E03.9]  Yes   • 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   RLE Cellulitis  - has chronic RLE wound, diabetic patient  - continue vancomycin and fortaz, blood cx's NGTD  - wound cx growing proteus, stenotrophomonas, and enterococcus-will ask ID for antibiotic recommendations  - continue pain control and local wound care     Type 2 DM  - complications include neuropathy and nephropathy  - BG elevated  - increase lantus to 35 units daily  - increase lispro to 10 units TID with meals  - continue coverage with ssi/hypoglycemia protocol low dose     CKD Stage 4  - baseline serum creatinine is 2.3-2.4  - monitor with above treatments  - continue torsemide     Hypothyroidism  - continue  levothyroxine      Lovenox 30 mg SC daily for DVT prophylaxis.  Full code.  Discussed with patient, nursing staff, and care team on multidisciplinary rounds.  Anticipate discharge to SNU facility tomorrow.  Expected Discharge Date: 1/20/2025; Expected Discharge Time:       Lewis Sheppard MD  [facility]  01/20/25  12:41 EST    Portions of this text have been copied and I have reviewed them. They are accurate as of 1/20/2025

## 2025-01-21 VITALS
RESPIRATION RATE: 18 BRPM | HEART RATE: 65 BPM | BODY MASS INDEX: 37.28 KG/M2 | HEIGHT: 65 IN | WEIGHT: 223.77 LBS | DIASTOLIC BLOOD PRESSURE: 64 MMHG | OXYGEN SATURATION: 94 % | SYSTOLIC BLOOD PRESSURE: 171 MMHG | TEMPERATURE: 97.5 F

## 2025-01-21 LAB
ANION GAP SERPL CALCULATED.3IONS-SCNC: 18 MMOL/L (ref 5–15)
BACTERIA SPEC AEROBE CULT: ABNORMAL
BASOPHILS # BLD AUTO: 0.11 10*3/MM3 (ref 0–0.2)
BASOPHILS NFR BLD AUTO: 1.1 % (ref 0–1.5)
BUN SERPL-MCNC: 78 MG/DL (ref 8–23)
BUN/CREAT SERPL: 27 (ref 7–25)
CALCIUM SPEC-SCNC: 8.1 MG/DL (ref 8.6–10.5)
CHLORIDE SERPL-SCNC: 100 MMOL/L (ref 98–107)
CO2 SERPL-SCNC: 21 MMOL/L (ref 22–29)
CREAT SERPL-MCNC: 2.89 MG/DL (ref 0.57–1)
DEPRECATED RDW RBC AUTO: 47.2 FL (ref 37–54)
EGFRCR SERPLBLD CKD-EPI 2021: 15.6 ML/MIN/1.73
EOSINOPHIL # BLD AUTO: 0.59 10*3/MM3 (ref 0–0.4)
EOSINOPHIL NFR BLD AUTO: 5.7 % (ref 0.3–6.2)
ERYTHROCYTE [DISTWIDTH] IN BLOOD BY AUTOMATED COUNT: 14.8 % (ref 12.3–15.4)
GLUCOSE BLDC GLUCOMTR-MCNC: 294 MG/DL (ref 70–130)
GLUCOSE BLDC GLUCOMTR-MCNC: 314 MG/DL (ref 70–130)
GLUCOSE SERPL-MCNC: 249 MG/DL (ref 65–99)
GRAM STN SPEC: ABNORMAL
GRAM STN SPEC: ABNORMAL
HCT VFR BLD AUTO: 28.7 % (ref 34–46.6)
HGB BLD-MCNC: 9.2 G/DL (ref 12–15.9)
IMM GRANULOCYTES # BLD AUTO: 0.05 10*3/MM3 (ref 0–0.05)
IMM GRANULOCYTES NFR BLD AUTO: 0.5 % (ref 0–0.5)
LYMPHOCYTES # BLD AUTO: 1.11 10*3/MM3 (ref 0.7–3.1)
LYMPHOCYTES NFR BLD AUTO: 10.7 % (ref 19.6–45.3)
MCH RBC QN AUTO: 27.5 PG (ref 26.6–33)
MCHC RBC AUTO-ENTMCNC: 32.1 G/DL (ref 31.5–35.7)
MCV RBC AUTO: 85.9 FL (ref 79–97)
MONOCYTES # BLD AUTO: 0.96 10*3/MM3 (ref 0.1–0.9)
MONOCYTES NFR BLD AUTO: 9.3 % (ref 5–12)
NEUTROPHILS NFR BLD AUTO: 7.52 10*3/MM3 (ref 1.7–7)
NEUTROPHILS NFR BLD AUTO: 72.7 % (ref 42.7–76)
NRBC BLD AUTO-RTO: 0 /100 WBC (ref 0–0.2)
PLATELET # BLD AUTO: 304 10*3/MM3 (ref 140–450)
PMV BLD AUTO: 10.7 FL (ref 6–12)
POTASSIUM SERPL-SCNC: 3.9 MMOL/L (ref 3.5–5.2)
RBC # BLD AUTO: 3.34 10*6/MM3 (ref 3.77–5.28)
SODIUM SERPL-SCNC: 139 MMOL/L (ref 136–145)
WBC NRBC COR # BLD AUTO: 10.34 10*3/MM3 (ref 3.4–10.8)

## 2025-01-21 PROCEDURE — 82948 REAGENT STRIP/BLOOD GLUCOSE: CPT

## 2025-01-21 PROCEDURE — 63710000001 INSULIN LISPRO (HUMAN) PER 5 UNITS: Performed by: INTERNAL MEDICINE

## 2025-01-21 PROCEDURE — 80048 BASIC METABOLIC PNL TOTAL CA: CPT | Performed by: INTERNAL MEDICINE

## 2025-01-21 PROCEDURE — 63710000001 INSULIN GLARGINE PER 5 UNITS: Performed by: INTERNAL MEDICINE

## 2025-01-21 PROCEDURE — 85025 COMPLETE CBC W/AUTO DIFF WBC: CPT | Performed by: INTERNAL MEDICINE

## 2025-01-21 RX ORDER — LEVOFLOXACIN 250 MG/1
250 TABLET, FILM COATED ORAL ONCE
Qty: 1 TABLET | Refills: 0 | Status: SHIPPED | OUTPATIENT
Start: 2025-01-22 | End: 2025-01-21

## 2025-01-21 RX ORDER — LEVOFLOXACIN 500 MG/1
500 TABLET, FILM COATED ORAL ONCE
Qty: 1 TABLET | Refills: 0 | Status: SHIPPED | OUTPATIENT
Start: 2025-01-22 | End: 2025-01-22

## 2025-01-21 RX ORDER — PREGABALIN 75 MG/1
75 CAPSULE ORAL 2 TIMES DAILY
Qty: 6 CAPSULE | Refills: 0 | Status: SHIPPED | OUTPATIENT
Start: 2025-01-21 | End: 2025-01-24

## 2025-01-21 RX ORDER — TORSEMIDE 20 MG/1
40 TABLET ORAL DAILY
Start: 2025-01-21

## 2025-01-21 RX ADMIN — PREGABALIN 75 MG: 75 CAPSULE ORAL at 08:57

## 2025-01-21 RX ADMIN — GUAIFENESIN 200 MG: 100 LIQUID ORAL at 13:11

## 2025-01-21 RX ADMIN — FLUTICASONE PROPIONATE 2 SPRAY: 50 SPRAY, METERED NASAL at 08:59

## 2025-01-21 RX ADMIN — INSULIN LISPRO 10 UNITS: 100 INJECTION, SOLUTION INTRAVENOUS; SUBCUTANEOUS at 08:58

## 2025-01-21 RX ADMIN — PANTOPRAZOLE SODIUM 40 MG: 40 TABLET, DELAYED RELEASE ORAL at 07:04

## 2025-01-21 RX ADMIN — TORSEMIDE 40 MG: 20 TABLET ORAL at 08:57

## 2025-01-21 RX ADMIN — INSULIN LISPRO 10 UNITS: 100 INJECTION, SOLUTION INTRAVENOUS; SUBCUTANEOUS at 13:06

## 2025-01-21 RX ADMIN — Medication 1 APPLICATION: at 13:06

## 2025-01-21 RX ADMIN — INSULIN LISPRO 5 UNITS: 100 INJECTION, SOLUTION INTRAVENOUS; SUBCUTANEOUS at 13:06

## 2025-01-21 RX ADMIN — INSULIN LISPRO 4 UNITS: 100 INJECTION, SOLUTION INTRAVENOUS; SUBCUTANEOUS at 08:58

## 2025-01-21 RX ADMIN — INSULIN GLARGINE 35 UNITS: 100 INJECTION, SOLUTION SUBCUTANEOUS at 08:57

## 2025-01-21 RX ADMIN — BISOPROLOL FUMARATE 5 MG: 5 TABLET ORAL at 08:59

## 2025-01-21 RX ADMIN — LEVOTHYROXINE SODIUM 137 MCG: 0.03 TABLET ORAL at 07:04

## 2025-01-21 RX ADMIN — ZINC OXIDE 1 APPLICATION: 200 OINTMENT TOPICAL at 13:07

## 2025-01-21 NOTE — DISCHARGE SUMMARY
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       Presenting Problem/History of Present Illness:  Hypoglycemia [E16.2]  Cellulitis of right leg [L03.115]  Cellulitis of right lower extremity [L03.115]  Bilateral lower extremity edema [R60.0]  Ulcer of right lower extremity, unspecified ulcer stage [L97.919]     Hospital Course:  The patient is a 84 y.o. female with a history of Type 2 DM with peripheral and autonomic neuropathy, hypothyroidism, and CKD Stage 4 who presented with RLE pain and swelling and was found to have cellulitis. She was started on empiric vancomycin and ceftazidime which she completed in the hospital. She has a RLE wound and there was no evidence of abscess. Wound cultures grew proteus, stenotrophomonas, and enterococcus. Infectious diseases evaluated her and recommended renally dosed levofloxacin for 3 days. She received her first dose on 1/20/25 and should receive her second and final dose tomorrow. She is medically stable and will be discharged to rehab.    Exam Today:  Blood pressure 171/64, pulse 65, temperature 97.5 °F (36.4 °C), temperature source Oral, resp. rate 18, height 165.1 cm (65\"), weight 101 kg (223 lb 12.3 oz), SpO2 94%, not currently breastfeeding.  Vitals and nursing note reviewed.   HENT: "      Head: Normocephalic and atraumatic.      Nose: Nose normal.      Mouth/Throat:      Mouth: Mucous membranes are moist.      Pharynx: Oropharynx is clear.   Eyes:      Conjunctiva/sclera: Conjunctivae normal.      Pupils: Pupils are equal, round, and reactive to light.   Cardiovascular:      Rate and Rhythm: Normal rate and regular rhythm.      Pulses: Normal pulses.   Pulmonary:      Effort: Pulmonary effort is normal.   Abdominal:      General: Bowel sounds are normal. There is no distension.      Palpations: Abdomen is soft.      Tenderness: There is no abdominal tenderness.   Musculoskeletal:         General:1-2+ BLE edema     Cervical back: Neck supple.   Skin:     General: Skin is warm and dry.      Capillary Refill: Capillary refill takes less than 2 seconds.      Findings:BLE erythema much improved  Neurological:      General: No focal deficit present.      Mental Status: She is alert and oriented to person, place, and time.   Psychiatric:         Mood and Affect: Mood normal.         Behavior: Behavior normal.       Consults:   Consults       Date and Time Order Name Status Description    1/20/2025 10:51 AM Inpatient Infectious Diseases Consult Completed     1/14/2025 10:53 PM [facility] (on-call MD unless specified) Details               Discharge Disposition:  Skilled Nursing Facility (DC - External)    Discharge Medications:     Discharge Medications        New Medications        Instructions Start Date   levoFLOXacin 250 MG tablet  Commonly known as: LEVAQUIN   250 mg, Oral, Once   Start Date: January 22, 2025            Changes to Medications        Instructions Start Date   torsemide 20 MG tablet  Commonly known as: DEMADEX  What changed: when to take this   40 mg, Oral, Daily             Continue These Medications        Instructions Start Date   acetaminophen 325 MG tablet  Commonly known as: TYLENOL   650 mg, Oral, Every 6 Hours PRN      ammonium lactate 12 % cream  Commonly known as: AMLACTIN   1  Application, Topical, 2 Times Daily      atorvastatin 80 MG tablet  Commonly known as: LIPITOR   80 mg, Oral, Nightly      BD Pen Needle Naila 2nd Gen 32G X 4 MM misc  Generic drug: Insulin Pen Needle   Use 1 pen needle 4 (Four) Times a Day.      bisoprolol 5 MG tablet  Commonly known as: ZEBeta   5 mg, Oral, Daily      DULoxetine 30 MG capsule  Commonly known as: CYMBALTA   30 mg, Oral, Daily      ferrous sulfate 325 (65 FE) MG tablet   325 mg, Oral, Every Other Day      fluticasone 50 MCG/ACT nasal spray  Commonly known as: FLONASE   2 sprays, Nasal, Daily      HumaLOG KwikPen 100 UNIT/ML solution pen-injector  Generic drug: Insulin Lispro (1 Unit Dial)   15-20 Units, Subcutaneous, 3 Times Daily Before Meals      hydrocortisone-bacitracin-zinc oxide-nystatin  Commonly known as: MAGIC BARRIER   1 Application, Topical, 2 Times Daily      lansoprazole 30 MG capsule  Commonly known as: PREVACID   30 mg, Oral, Daily      levothyroxine 137 MCG tablet  Commonly known as: SYNTHROID, LEVOTHROID   137 mcg, Oral, Every Early Morning      octreotide 30 MG injection  Commonly known as: sandoSTATIN   30 mg, Every 28 Days      pregabalin 75 MG capsule  Commonly known as: LYRICA   75 mg, Oral, 2 Times Daily      terazosin 1 MG capsule  Commonly known as: HYTRIN   1 mg, Oral, Nightly      Toujeo Max SoloStar 300 UNIT/ML solution pen-injector injection  Generic drug: Insulin Glargine (2 Unit Dial)   90 Units, Subcutaneous, Daily             Stop These Medications      hydrALAZINE 50 MG tablet  Commonly known as: APRESOLINE              Discharge Diet:   Diet Instructions       Diet: Diabetic Diets, Cardiac Diets; Healthy Heart (2-3 Na+); Regular (IDDSI 7); Thin (IDDSI 0); Consistent Carbohydrate      Discharge Diet:  Diabetic Diets  Cardiac Diets       Cardiac Diet: Healthy Heart (2-3 Na+)    Texture: Regular (IDDSI 7)    Fluid Consistency: Thin (IDDSI 0)    Diabetic Diet: Consistent Carbohydrate            Activity at Discharge:    Activity Instructions       Activity as Tolerated              Follow-up Appointments:  Future Appointments   Date Time Provider Department Center   1/28/2025  1:15 PM NAY [facility] CT 1  NA[facility] CT M [place]   2/3/2025  3:30 PM LAB CHAIR [facility]  LAB [facility] [place]   2/3/2025  4:00 PM Napoleon Gutierrez MD [facility] [place]   2/3/2025  4:30 PM CHAIR 14 [facility] Jag COYLE  [facility]     Additional Instructions for the Follow-ups that You Need to Schedule       Discharge Follow-up with Specialty: general practitioner or PCP at SNF   As directed      Specialty: general practitioner or PCP at SNF   Follow Up Details: 1-3d                 Lewis Sheppard MD  01/21/25  10:14 EST    Time Spent on Discharge Activities: Greater than 30 minutes.

## 2025-01-21 NOTE — CASE MANAGEMENT/SOCIAL WORK
Continued Stay Note  [facility]     Patient Name: Halie Guidry  MRN: [id number]  Today's Date: 1/21/2025    Admit Date: 1/14/2025    Plan: SNF at [place]   Discharge Plan       Row Name 01/21/25 1137       Plan    Plan SNF at [place]    Patient/Family in Agreement with Plan yes    Plan Comments Spoke with Yani/Donnell, updated of pt dispo and timing, she will follow after rehab stay.   Precert approved, scheduled [facility] for this afternoon, at 1600, updated all of transport timing, pkt to RN, faxed dc summary. Based on interdisciplinary assessments, the recommended discharge plan is SNF. CCP will follow - Savita CORDON                   Discharge Codes    No documentation.                 Expected Discharge Date and Time       Expected Discharge Date Expected Discharge Time    Jan 21, 2025               Savita Gomez RN

## 2025-01-21 NOTE — CASE MANAGEMENT/SOCIAL WORK
Case Management Discharge Note      Final Note: SNF at [place], via  [facility]         Selected Continued Care - Admitted Since 1/14/2025       Destination Coordination complete.      Service Provider Services Address Phone Fax Patient Preferred    [facility] [place] [facility] [address], [facility] [address] [phone] --              Durable Medical Equipment    No services have been selected for the patient.                Dialysis/Infusion    No services have been selected for the patient.                Home Medical Care       Service Provider Services Address Phone Fax Patient Preferred    [facility] [address], [address], [place] [facility] [address] [phone] --              Therapy    No services have been selected for the patient.                Community Resources    No services have been selected for the patient.                Community & DME    No services have been selected for the patient.                    Selected Continued Care - Episodes Includes continued care and service providers with selected services from the active episodes listed below      [facility] Episode start date: 9/10/2024   There are no active outsourced providers for this episode.                 Selected Continued Care - Prior Encounters Includes continued care and service providers with selected services from prior encounters from 10/16/2024 to 1/21/2025      Discharged on 11/11/2024 Admission date: 11/5/2024 - Discharge disposition: Home or Self Care      Home Medical Care       Service Provider Services Address Phone Fax Patient Preferred    [facility] [address], [address], [place] [facility] [address] [phone] --                      Discharged on 10/19/2024 Admission date: 10/10/2024 - Discharge disposition: Home or Self Care      Destination       Service  Provider Services Address Phone Fax Patient Preferred    [facility] [address] [address] [phone] --              Home Medical Care       Service Provider Services Address Phone Fax Patient Preferred    [facility] [address], [address] [address] [phone]        Internal Comment last updated by Alon Hernandez, RN 10/11/2024 1015    Patient is current with Donnell. ALDEN RuggieroN RN                                     Transportation Services  Ambulance: [facility]  [facility] Ambulance Status: Accepted    Final Discharge Disposition Code: 03 - skilled nursing facility (SNF)

## 2025-01-21 NOTE — DISCHARGE PLACEMENT REQUEST
"Tabatha Guidry (84 y.o. Female)       Date of Birth   1940    Social Security Number       Address   [address]  [place] [id number]    Home Phone   [phone]    MRN   [id number]       Jew   Caodaism    Marital Status                               Admission Date   1/14/25    Admission Type   Emergency    Admitting Provider   Lewis Sheppard MD    Attending Provider   Lewis Sheppard MD    Department, Room/Bed   [address], N631/1       Discharge Date       Discharge Disposition   Skilled Nursing Facility (DC - External)    Discharge Destination                                 Attending Provider: Lewis Sheppard MD    Allergies: Sulfa Antibiotics    Isolation: Contact   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), 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), MDRO (01/20/25)   Code Status: CPR    Ht: 165.1 cm (65\")   Wt: 101 kg (223 lb 12.3 oz)    Admission Cmt: None   Principal Problem: Cellulitis of right lower extremity [L03.115]                   Active Insurance as of 1/14/2025       Primary Coverage       Payor Plan Insurance Group Employer/Plan Group    [facility] MEDICARE REPLACEMENT [facility] MED ADV GROUP [id number]       Payor Plan Address Payor Plan Phone Number Payor Plan Fax Number Effective Dates    [address] [address]   1/1/2023 - None Entered    [place] [id number]         Subscriber Name Subscriber Birth Date Member ID       TABATHA GUIDRY 1940 [id number]                     Emergency Contacts        (Rel.) Home Phone Work Phone Mobile Phone    BreaDerrick (HCS) (Son) [phone] -- [phone]    BreaJosse ([facility]CS) (Son) -- -- [phone]    BREAJAYME (Grandchild) -- -- [phone]                 Discharge Summary        Lewis Sheppard MD at 01/21/25 1010          Date of Admission: 1/14/2025  Date of Discharge:  1/21/2025  Primary Care Physician: Napoleon Mead MD " "    Discharge Diagnosis:  Active Hospital Problems    Diagnosis  POA    **Cellulitis of right lower extremity [L03.115]  Yes    CKD (chronic kidney disease) stage 4, GFR 15-29 ml/min [N18.4]  Yes    Pancreatic neoplasm [D49.0]  Yes    Gastroesophageal reflux disease [K21.9]  Yes    Type 2 diabetes mellitus with hyperglycemia [E11.65]  Yes    Severe hypothyroidism [E03.9]  Yes    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       Presenting Problem/History of Present Illness:  Hypoglycemia [E16.2]  Cellulitis of right leg [L03.115]  Cellulitis of right lower extremity [L03.115]  Bilateral lower extremity edema [R60.0]  Ulcer of right lower extremity, unspecified ulcer stage [L97.919]     Hospital Course:  The patient is a 84 y.o. female with a history of Type 2 DM with peripheral and autonomic neuropathy, hypothyroidism, and CKD Stage 4 who presented with RLE pain and swelling and was found to have cellulitis. She was started on empiric vancomycin and ceftazidime which she completed in the hospital. She has a RLE wound and there was no evidence of abscess. Wound cultures grew proteus, stenotrophomonas, and enterococcus. Infectious diseases evaluated her and recommended renally dosed levofloxacin for 3 days. She received her first dose on 1/20/25 and should receive her second and final dose tomorrow. She is medically stable and will be discharged to rehab.    Exam Today:  Blood pressure 171/64, pulse 65, temperature 97.5 °F (36.4 °C), temperature source Oral, resp. rate 18, height 165.1 cm (65\"), weight 101 kg (223 lb 12.3 oz), SpO2 94%, not currently breastfeeding.  Vitals and nursing note reviewed.   HENT:      Head: Normocephalic and atraumatic.      Nose: Nose normal.      Mouth/Throat:      Mouth: Mucous " membranes are moist.      Pharynx: Oropharynx is clear.   Eyes:      Conjunctiva/sclera: Conjunctivae normal.      Pupils: Pupils are equal, round, and reactive to light.   Cardiovascular:      Rate and Rhythm: Normal rate and regular rhythm.      Pulses: Normal pulses.   Pulmonary:      Effort: Pulmonary effort is normal.   Abdominal:      General: Bowel sounds are normal. There is no distension.      Palpations: Abdomen is soft.      Tenderness: There is no abdominal tenderness.   Musculoskeletal:         General:1-2+ BLE edema     Cervical back: Neck supple.   Skin:     General: Skin is warm and dry.      Capillary Refill: Capillary refill takes less than 2 seconds.      Findings:BLE erythema much improved  Neurological:      General: No focal deficit present.      Mental Status: She is alert and oriented to person, place, and time.   Psychiatric:         Mood and Affect: Mood normal.         Behavior: Behavior normal.       Consults:   Consults       Date and Time Order Name Status Description    1/20/2025 10:51 AM Inpatient Infectious Diseases Consult Completed     1/14/2025 10:53 PM [facility] (on-call MD unless specified) Details               Discharge Disposition:  Skilled Nursing Facility (DC - External)    Discharge Medications:     Discharge Medications        New Medications        Instructions Start Date   levoFLOXacin 250 MG tablet  Commonly known as: LEVAQUIN   250 mg, Oral, Once   Start Date: January 22, 2025            Changes to Medications        Instructions Start Date   torsemide 20 MG tablet  Commonly known as: DEMADEX  What changed: when to take this   40 mg, Oral, Daily             Continue These Medications        Instructions Start Date   acetaminophen 325 MG tablet  Commonly known as: TYLENOL   650 mg, Oral, Every 6 Hours PRN      ammonium lactate 12 % cream  Commonly known as: AMLACTIN   1 Application, Topical, 2 Times Daily      atorvastatin 80 MG tablet  Commonly known as: LIPITOR   80 mg,  Oral, Nightly      BD Pen Needle Naila 2nd Gen 32G X 4 MM misc  Generic drug: Insulin Pen Needle   Use 1 pen needle 4 (Four) Times a Day.      bisoprolol 5 MG tablet  Commonly known as: ZEBeta   5 mg, Oral, Daily      DULoxetine 30 MG capsule  Commonly known as: CYMBALTA   30 mg, Oral, Daily      ferrous sulfate 325 (65 FE) MG tablet   325 mg, Oral, Every Other Day      fluticasone 50 MCG/ACT nasal spray  Commonly known as: FLONASE   2 sprays, Nasal, Daily      HumaLOG KwikPen 100 UNIT/ML solution pen-injector  Generic drug: Insulin Lispro (1 Unit Dial)   15-20 Units, Subcutaneous, 3 Times Daily Before Meals      hydrocortisone-bacitracin-zinc oxide-nystatin  Commonly known as: MAGIC BARRIER   1 Application, Topical, 2 Times Daily      lansoprazole 30 MG capsule  Commonly known as: PREVACID   30 mg, Oral, Daily      levothyroxine 137 MCG tablet  Commonly known as: SYNTHROID, LEVOTHROID   137 mcg, Oral, Every Early Morning      octreotide 30 MG injection  Commonly known as: sandoSTATIN   30 mg, Every 28 Days      pregabalin 75 MG capsule  Commonly known as: LYRICA   75 mg, Oral, 2 Times Daily      terazosin 1 MG capsule  Commonly known as: HYTRIN   1 mg, Oral, Nightly      Toujeo Max SoloStar 300 UNIT/ML solution pen-injector injection  Generic drug: Insulin Glargine (2 Unit Dial)   90 Units, Subcutaneous, Daily             Stop These Medications      hydrALAZINE 50 MG tablet  Commonly known as: APRESOLINE              Discharge Diet:   Diet Instructions       Diet: Diabetic Diets, Cardiac Diets; Healthy Heart (2-3 Na+); Regular (IDDSI 7); Thin (IDDSI 0); Consistent Carbohydrate      Discharge Diet:  Diabetic Diets  Cardiac Diets       Cardiac Diet: Healthy Heart (2-3 Na+)    Texture: Regular (IDDSI 7)    Fluid Consistency: Thin (IDDSI 0)    Diabetic Diet: Consistent Carbohydrate            Activity at Discharge:   Activity Instructions       Activity as Tolerated              Follow-up Appointments:  Future  Appointments   Date Time Provider Department Center   1/28/2025  1:15 PM NAY [facility] CT 1  [facility] [place]   2/3/2025  3:30 PM LAB CHAIR 6 [facility]  LAB [facility] [place]   2/3/2025  4:00 PM Napoleon Gutierrez MD [facility] [place]   2/3/2025  4:30 PM CHAIR 14 [facility]  [facility]     Additional Instructions for the Follow-ups that You Need to Schedule       Discharge Follow-up with Specialty: general practitioner or PCP at SNF   As directed      Specialty: general practitioner or PCP at SNF   Follow Up Details: 1-3d                 Lewis Sheppard MD  01/21/25  10:14 EST    Time Spent on Discharge Activities: Greater than 30 minutes.          Electronically signed by Lewis Sheppard MD at 01/21/25 5194

## 2025-01-21 NOTE — NURSING NOTE
Patient is being discharged at this time to SNF at [facility]. Report called to PAUL Torres. No further questions. AVS sent in discharge packet. Patient given a copy of discharge instructions. Medications and follow-up appointments reviewed with patient. Patient's IV and telemetry were removed prior to leaving hospital. Patient left with all personal belongings. Spoke with April MILLER at [facility] (where she was living prior to admission) for an update on the patient's discharge plan.

## 2025-01-28 ENCOUNTER — HOSPITAL ENCOUNTER (OUTPATIENT)
Dept: CT IMAGING | Facility: HOSPITAL | Age: 85
Discharge: HOME | End: 2025-01-28
Payer: MEDICARE

## 2025-01-29 ENCOUNTER — TELEPHONE (OUTPATIENT)
Dept: ONCOLOGY | Facility: CLINIC | Age: 85
End: 2025-01-29
Payer: MEDICARE

## 2025-01-29 NOTE — TELEPHONE ENCOUNTER
----- Message from Napoleon Gutierrez sent at 1/29/2025  3:04 PM EST -----  Regarding: RE: FYI appt 12/3  I guess keep as is  ----- Message -----  From: Kayden Wells RN  Sent: 1/29/2025   2:34 PM EST  To: Napoleon Gutierrez MD  Subject: FW: FY appt 12/3                                Patient's CT scan keeps getting pushed back. She has not had octreotide since November 2024. Do you want me to leave 2/3 appointment as is and you can just review the scan when she gets it done?    Or do you want me to push MD visit to after scan and leave octreotide on for 2/3?  ----- Message -----  From: Meena Aggarwal  Sent: 1/29/2025  10:25 AM EST  To: Kayden Wells RN  Subject: FY appt 12/3                                    Ct moved to after the appt with him missed the scan on 1/15

## 2025-02-03 DIAGNOSIS — M79.2 NEUROPATHIC PAIN: ICD-10-CM

## 2025-02-03 RX ORDER — PREGABALIN 75 MG/1
75 CAPSULE ORAL 2 TIMES DAILY
Qty: 60 CAPSULE | Refills: 2 | OUTPATIENT
Start: 2025-02-03

## 2025-02-05 NOTE — TELEPHONE ENCOUNTER
I talked to loraine zazueta verbal orders given for PT per dr flanagan   Patient : Gorge Conte Age: 61 year old Sex: male   MRN: [id number] Encounter Date: 2/5/2025      History     Chief Complaint   Patient presents with    Shortness of Breath    Panic Attack     Patient is a 61 year old male with a past medical hx of COPD who presents to the ED for evaluation of acute onset SOB. Patient was scheduled to receive an outpatient ultrasound of his bilateral lower extremities, and while walking through the hospital lobby, he became acutely SOB. In addition to his SOB, he reports currently feeling anxious, as he does not like being in hospitals.    The history is provided by the patient and medical records.       Allergies   Allergen Reactions    Tramadol RASH and PRURITUS       Current Discharge Medication List        Prior to Admission Medications    Details   gabapentin (NEURONTIN) 400 MG capsule Take 2 capsules by mouth in the morning and 2 capsules at noon and 2 capsules in the evening.  Qty: 180 capsule, Refills: 0      lidocaine (LIDOCARE) 4 % patch Place 2 patches onto the skin daily.  Qty: 60 patch, Refills: 0      oxyCODONE-acetaminophen (PERCOCET) 5-325 MG per tablet Take 1 tablet by mouth every 4 hours as needed for Pain.  Qty: 20 tablet, Refills: 0      acetaminophen (TYLENOL) 500 MG tablet Take 1,000 mg by mouth every 6 hours as needed for Pain.      albuterol 108 (90 Base) MCG/ACT inhaler Inhale 2 puffs into the lungs every 4 hours as needed for Shortness of Breath or Wheezing.  Qty: 1 each, Refills: 1      budesonide-formoterol (Symbicort) 160-4.5 MCG/ACT inhaler Inhale 2 puffs into the lungs in the morning and 2 puffs in the evening.  Qty: 10.2 g, Refills: 1      lisinopril-hydroCHLOROthiazide (ZESTORETIC) 20-25 MG per tablet TAKE 1 TABLET BY MOUTH DAILY  Qty: 90 tablet, Refills: 0      omeprazole (PriLOSEC) 40 MG capsule Take 1 capsule by mouth in the morning and 1 capsule in the evening.  Qty: 28 capsule, Refills: 0      atorvastatin (LIPITOR) 40 MG tablet Take 1 tablet  by mouth daily.  Qty: 90 tablet, Refills: 2      albuterol (ACCUNEB) 0.63 MG/3ML nebulizer solution Take 3 mLs by nebulization every 6 hours as needed for Wheezing.  Qty: 3 mL, Refills: 2      amLODIPine (NORVASC) 10 MG tablet Take 1 tablet by mouth daily.  Qty: 90 tablet, Refills: 0             Past Medical History:   Diagnosis Date    Arthritis     generalized    Asthma (CMD)     patient denies    Blood in stool     Cerebral infarction (CMD)     2021    COPD (chronic obstructive pulmonary disease)  (CMD)     Essential (primary) hypertension     Gastroesophageal reflux disease     HTN (hypertension)     non compliant with b/p does not take regulary    Hyperplasia, fatty tissue     TIA (transient ischemic attack)        Past Surgical History:   Procedure Laterality Date    ESOPHAGOGASTRODUODENOSCOPY TRANSORAL FLEX W/BX SINGLE OR MULT      FRACTURE SURGERY      right  leg    OPEN ACCESS COLONOSCOPY      ORTHOPEDIC SURGERY Right     plate and carmel in ankle       Family History   Problem Relation Age of Onset    Patient is unaware of any medical problems Mother     Patient is unaware of any medical problems Father        Social History     Tobacco Use    Smoking status: Every Day     Current packs/day: 0.50     Average packs/day: 0.5 packs/day for 51.1 years (25.5 ttl pk-yrs)     Types: Cigarettes     Start date: 1974    Smokeless tobacco: Never   Vaping Use    Vaping status: never used   Substance Use Topics    Alcohol use: Not Currently     Comment: social    Drug use: Never       Review of Systems   Respiratory:  Positive for shortness of breath.    Psychiatric/Behavioral:  The patient is nervous/anxious.        Physical Exam     ED Triage Vitals [02/05/25 1117]   ED Triage Vitals Group      Temp 98 °F (36.7 °C)      Heart Rate 96      Resp (!) 26      BP (!) 157/73      SpO2 100 %      EtCO2 mmHg       Height       Weight       Weight Scale Used       BMI (Calculated)       IBW/kg (Calculated)        Physical  Exam  Vitals and nursing note reviewed.   Constitutional:       General: He is in acute distress.      Appearance: Normal appearance. He is not ill-appearing.      Interventions: Face mask in place.      Comments: Pt speaking in short one-two word sentences.   Appears anxious   HENT:      Head: Normocephalic and atraumatic.      Nose: Nose normal.      Mouth/Throat:      Pharynx: Oropharynx is clear.      Neck: Normal range of motion and neck supple.   Eyes:      General:         Right eye: No discharge.         Left eye: No discharge.      Extraocular Movements: Extraocular movements intact.      Pupils: Pupils are equal, round, and reactive to light.   Cardiovascular:      Rate and Rhythm: Regular rhythm. Tachycardia present.      Pulses: Normal pulses.      Heart sounds: Normal heart sounds.   Pulmonary:      Effort: Tachypnea and respiratory distress present.      Breath sounds: Normal breath sounds. No wheezing.   Abdominal:      General: Bowel sounds are normal. There is no distension.      Palpations: Abdomen is soft.      Tenderness: There is no abdominal tenderness.   Musculoskeletal:         General: No swelling or tenderness. Normal range of motion.   Skin:     General: Skin is warm and dry.      Capillary Refill: Capillary refill takes less than 2 seconds.      Coloration: Skin is not jaundiced.      Findings: No rash.   Neurological:      Mental Status: Mental status is at baseline.      Cranial Nerves: No cranial nerve deficit.      Sensory: No sensory deficit.   Psychiatric:         Attention and Perception: Attention normal.         Mood and Affect: Mood is anxious.         Speech: Speech normal.         Behavior: Behavior is cooperative.         Cognition and Memory: Cognition normal.         ED Course     Critical Care    Performed by: Sara Motta MD  Authorized by: Sara Motta MD    Critical care provider statement:     Critical care time (minutes):  30    Critical care was necessary to  treat or prevent imminent or life-threatening deterioration of the following conditions: respiratory distress.    Critical care was time spent personally by me on the following activities:  Development of treatment plan with patient or surrogate, discussions with consultants, evaluation of patient's response to treatment, examination of patient, interpretation of cardiac output measurements, ordering and performing treatments and interventions, ordering and review of laboratory studies, ordering and review of radiographic studies, pulse oximetry, re-evaluation of patient's condition, review of old charts, vascular access procedures and blood draw for specimens    I assumed direction of critical care for this patient from another provider in my specialty: no        Lab Results     Results for orders placed or performed during the hospital encounter of 02/05/25   Comprehensive Metabolic Panel    Specimen: Blood, Venous   Result Value Ref Range    Fasting Status      Sodium 137 135 - 145 mmol/L    Potassium 3.9 3.4 - 5.1 mmol/L    Chloride 98 97 - 110 mmol/L    Carbon Dioxide 24 21 - 32 mmol/L    Anion Gap 19 7 - 19 mmol/L    Glucose 127 (H) 70 - 99 mg/dL    BUN 13 6 - 20 mg/dL    Creatinine 0.92 0.67 - 1.17 mg/dL    Glomerular Filtration Rate >90 >=60    BUN/Cr 14 7 - 25    Calcium 9.5 8.4 - 10.2 mg/dL    Bilirubin, Total 0.7 0.2 - 1.0 mg/dL    GOT/AST 69 (H) <=37 Units/L    GPT/ALT 92 (H) <64 Units/L    Alkaline Phosphatase 372 (H) 45 - 117 Units/L    Albumin 2.9 (L) 3.4 - 5.0 g/dL    Protein, Total 8.2 6.4 - 8.2 g/dL    Globulin 5.3 (H) 2.0 - 4.0 g/dL    A/G Ratio 0.5 (L) 1.0 - 2.4   TROPONIN I, HIGH SENSITIVITY    Specimen: Blood, Venous   Result Value Ref Range    Troponin I, High Sensitivity 9 <77 ng/L   COVID/Flu/RSV panel    Specimen: Nasal, Mid-turbinate; Swab   Result Value Ref Range    Rapid SARS-COV-2 by PCR Not Detected Not Detected / Detected / Presumptive Positive / Inhibitors present    Influenza A by  PCR Not Detected Not Detected    Influenza B by PCR Not Detected Not Detected    RSV BY PCR Not Detected Not Detected    Isolation Guidelines      Procedural Comment     CBC with Automated Differential (performable only)    Specimen: Blood, Venous   Result Value Ref Range    WBC 18.3 (H) 4.2 - 11.0 K/mcL    RBC 4.08 (L) 4.50 - 5.90 mil/mcL    HGB 9.7 (L) 13.0 - 17.0 g/dL    HCT 29.7 (L) 39.0 - 51.0 %    MCV 72.8 (L) 78.0 - 100.0 fl    MCH 23.8 (L) 26.0 - 34.0 pg    MCHC 32.7 32.0 - 36.5 g/dL    RDW-CV 17.2 (H) 11.0 - 15.0 %    RDW-SD 44.2 39.0 - 50.0 fL     (H) 140 - 450 K/mcL    NRBC 0 <=0 /100 WBC    Neutrophil, Percent 79 %    Lymphocytes, Percent 13 %    Mono, Percent 6 %    Eosinophils, Percent 1 %    Basophils, Percent 0 %    Immature Granulocytes 1 %    Absolute Neutrophils 14.6 (H) 1.8 - 7.7 K/mcL    Absolute Lymphocytes 2.3 1.0 - 4.0 K/mcL    Absolute Monocytes 1.0 (H) 0.3 - 0.9 K/mcL    Absolute Eosinophils  0.1 0.0 - 0.5 K/mcL    Absolute Basophils 0.0 0.0 - 0.3 K/mcL    Absolute Immature Granulocytes 0.3 (H) 0.0 - 0.2 K/mcL   Alcohol    Specimen: Blood, Venous   Result Value Ref Range    Alcohol None Detected None Detected mg/dL   Magnesium    Specimen: Blood, Venous   Result Value Ref Range    Magnesium 2.4 1.7 - 2.4 mg/dL   Procalcitonin    Specimen: Blood, Venous   Result Value Ref Range    Procalcitonin 0.26 (H) <0.10 ng/mL   D Dimer, Quantitative    Specimen: Blood, Venous   Result Value Ref Range    D Dimer, Quantitative 21.49 (H) <0.57 mg/L (FEU)   BLOOD GAS, VENOUS - RESPIRATORY    Specimen: Blood, Venous   Result Value Ref Range    SITE - RESPIRATORY Venous     TEMPERATURE - RESPIRATORY 37.0 degrees C    BASE EXCESS / DEFICIT, VENOUS - RESPIRATORY -1 -2 - 2 mmol/L    HCO3, VENOUS - RESPIRATORY 22.0 22.0 - 28.0 mmol/L    PCO2, VENOUS - RESPIRATORY 31 (L) 41 - 54 mm Hg    PH, VENOUS - RESPIRATORY 7.46 (H) 7.35 - 7.45 Units    PO2, VENOUS - RESPIRATORY 30 (L) 35 - 42 mm Hg    O2 SATURATION,  VENOUS - RESPIRATORY 62 60 - 80 %   CALCIUM, IONIZED - RESPIRATORY    Specimen: Blood   Result Value Ref Range    CALCIUM, IONIZED - RESPIRATORY 1.13 (L) 1.15 - 1.29 mmol/L   POTASSIUM - RESPIRATORY    Specimen: Blood   Result Value Ref Range    POTASSIUM - RESPIRATORY 4.1 3.4 - 5.1 mmol/L   SODIUM - RESPIRATORY    Specimen: Blood   Result Value Ref Range    SODIUM - RESPIRATORY 129 (L) 135 - 145 mmol/L   LACTIC ACID, VENOUS - RESPIRATORY    Specimen: Blood, Venous   Result Value Ref Range    LACTIC ACID, VENOUS - RESPIRATORY 3.1 (HH) <2.0 mmol/L   GLUCOSE, BEDSIDE - POINT OF CARE    Specimen: Blood   Result Value Ref Range    GLUCOSE, BEDSIDE - POINT OF CARE 110 (H) 70 - 99 mg/dL       EKG Results     EKG Interpretation  Rate: 95  Rhythm: normal sinus rhythm   Abnormality: yes    EKG tracing interpreted by ED physician    Radiology Results     Imaging Results              XR CHEST PA OR AP 1 VIEW (Final result)  Result time 02/05/25 12:24:04      Final result                   Impression:      Streaky opacity in the bilateral lung bases favored to represent  atelectasis or scarring although infiltrate is also possible in the left  lung base. Chest x-ray follow-up is advised.        Electronically Signed by: LIN SHERWOOD   Signed on: 2/5/2025 12:24 PM   Workstation ID: [id number]               Narrative:    PROCEDURE: SINGLE PORTABLE VIEW OF THE CHEST.    CLINICAL INDICATION: Shortness of breath.    COMPARISON: Chest x-ray on 1/28/2025    FINDINGS: Hyperinflated lungs.    Streaky opacities in the bilateral lung bases.  The cardiomediastinal  silhouette is unremarkable.                                       ED Medication Orders (From admission, onward)      Ordered Start     Status Ordering Provider    02/05/25 1118 02/05/25 1119  LORazepam (ATIVAN) injection 0.5 mg  ONCE         Last MAR action: Given KLAUS TOM    02/05/25 1105 02/05/25 1106  ipratropium-albuterol (DUONEB) 0.5-2.5 (3) MG/3ML nebulizer  solution 3 mL  ONCE         Last MAR action: Given SARA TOM            ED Course as of 02/05/25 1436   Wed Feb 05, 2025   1324 Sepsis alert called. [AT]   1338 Patient took his IV out and states he wants to leave despite risks of doing so. [GB]   1342 Patient ambulatory in department, took out his IV.  He was informed that he needs to stay in the hospital.  He states he wants to leave AGAINST MEDICAL ADVICE.  He was informed he could deteriorate and suffer permanent damages including death.  He states he wants to leave and walked out of the department.  He appears competent to make decisions regarding his medical health.  He is alert and oriented x 3 [GB]      ED Course User Index  [AT] Dean Gee  [GB] Sara Tom MD       Medical Decision Making    Diagnosis management comments: Multiple differential diagnoses were considered.     Clinical Impression     ED Diagnosis   1. SOB (shortness of breath)        2. Panic attack        3. Sepsis, due to unspecified organism, unspecified whether acute organ dysfunction present  (CMD)        4. Positive D dimer            Disposition        AMA 2/5/2025  1:43 PM  There is no comment    On 2/5/2025, IDean scribed the services personally performed by Dr. Tom.  The documentation recorded by the scribe accurately and completely reflects the service(s) I personally performed and the decisions made by me.       Sara Tom MD  02/05/25 3206

## 2025-02-17 ENCOUNTER — HOSPITAL ENCOUNTER (OUTPATIENT)
Facility: HOSPITAL | Age: 85
Setting detail: OBSERVATION
Discharge: NURSING, REHAB OR LONG-TERM CARE | End: 2025-02-21
Attending: EMERGENCY MEDICINE | Admitting: STUDENT IN AN ORGANIZED HEALTH CARE EDUCATION/TRAINING PROGRAM
Payer: MEDICARE

## 2025-02-17 DIAGNOSIS — D64.9 ANEMIA, UNSPECIFIED TYPE: ICD-10-CM

## 2025-02-17 DIAGNOSIS — N18.9 CHRONIC KIDNEY DISEASE, UNSPECIFIED CKD STAGE: ICD-10-CM

## 2025-02-17 DIAGNOSIS — D72.829 LEUKOCYTOSIS, UNSPECIFIED TYPE: ICD-10-CM

## 2025-02-17 DIAGNOSIS — R53.1 GENERAL WEAKNESS: ICD-10-CM

## 2025-02-17 DIAGNOSIS — R73.9 HYPERGLYCEMIA: ICD-10-CM

## 2025-02-17 DIAGNOSIS — I10 ACCELERATED HYPERTENSION: Primary | ICD-10-CM

## 2025-02-17 DIAGNOSIS — R26.2 INABILITY TO WALK: ICD-10-CM

## 2025-02-17 PROBLEM — I16.0 HYPERTENSIVE URGENCY: Status: ACTIVE | Noted: 2025-02-17

## 2025-02-17 LAB
ALBUMIN SERPL-MCNC: 3.7 G/DL (ref 3.5–5.2)
ALBUMIN/GLOB SERPL: 0.9 G/DL
ALP SERPL-CCNC: 165 U/L (ref 39–117)
ALT SERPL W P-5'-P-CCNC: 7 U/L (ref 1–33)
ANION GAP SERPL CALCULATED.3IONS-SCNC: 12 MMOL/L (ref 5–15)
AST SERPL-CCNC: 11 U/L (ref 1–32)
BACTERIA UR QL AUTO: ABNORMAL /HPF
BASOPHILS # BLD AUTO: 0.09 10*3/MM3 (ref 0–0.2)
BASOPHILS NFR BLD AUTO: 0.7 % (ref 0–1.5)
BILIRUB SERPL-MCNC: 0.3 MG/DL (ref 0–1.2)
BILIRUB UR QL STRIP: NEGATIVE
BUN SERPL-MCNC: 50 MG/DL (ref 8–23)
BUN/CREAT SERPL: 21.9 (ref 7–25)
CALCIUM SPEC-SCNC: 9.1 MG/DL (ref 8.6–10.5)
CHLORIDE SERPL-SCNC: 106 MMOL/L (ref 98–107)
CK SERPL-CCNC: 196 U/L (ref 20–180)
CLARITY UR: CLEAR
CO2 SERPL-SCNC: 23 MMOL/L (ref 22–29)
COLOR UR: YELLOW
CREAT SERPL-MCNC: 2.28 MG/DL (ref 0.57–1)
D-LACTATE SERPL-SCNC: 1.3 MMOL/L (ref 0.5–2)
DEPRECATED RDW RBC AUTO: 48.9 FL (ref 37–54)
EGFRCR SERPLBLD CKD-EPI 2021: 20.7 ML/MIN/1.73
EOSINOPHIL # BLD AUTO: 0.33 10*3/MM3 (ref 0–0.4)
EOSINOPHIL NFR BLD AUTO: 2.5 % (ref 0.3–6.2)
ERYTHROCYTE [DISTWIDTH] IN BLOOD BY AUTOMATED COUNT: 15.7 % (ref 12.3–15.4)
GLOBULIN UR ELPH-MCNC: 4.1 GM/DL
GLUCOSE BLDC GLUCOMTR-MCNC: 230 MG/DL (ref 70–130)
GLUCOSE SERPL-MCNC: 125 MG/DL (ref 65–99)
GLUCOSE UR STRIP-MCNC: NEGATIVE MG/DL
HCT VFR BLD AUTO: 29.9 % (ref 34–46.6)
HGB BLD-MCNC: 9.6 G/DL (ref 12–15.9)
HGB UR QL STRIP.AUTO: ABNORMAL
HYALINE CASTS UR QL AUTO: ABNORMAL /LPF
IMM GRANULOCYTES # BLD AUTO: 0.08 10*3/MM3 (ref 0–0.05)
IMM GRANULOCYTES NFR BLD AUTO: 0.6 % (ref 0–0.5)
KETONES UR QL STRIP: NEGATIVE
LEUKOCYTE ESTERASE UR QL STRIP.AUTO: ABNORMAL
LIPASE SERPL-CCNC: 46 U/L (ref 13–60)
LYMPHOCYTES # BLD AUTO: 0.91 10*3/MM3 (ref 0.7–3.1)
LYMPHOCYTES NFR BLD AUTO: 7 % (ref 19.6–45.3)
MAGNESIUM SERPL-MCNC: 2.1 MG/DL (ref 1.6–2.4)
MCH RBC QN AUTO: 27.6 PG (ref 26.6–33)
MCHC RBC AUTO-ENTMCNC: 32.1 G/DL (ref 31.5–35.7)
MCV RBC AUTO: 85.9 FL (ref 79–97)
MONOCYTES # BLD AUTO: 1.08 10*3/MM3 (ref 0.1–0.9)
MONOCYTES NFR BLD AUTO: 8.3 % (ref 5–12)
NEUTROPHILS NFR BLD AUTO: 10.51 10*3/MM3 (ref 1.7–7)
NEUTROPHILS NFR BLD AUTO: 80.9 % (ref 42.7–76)
NITRITE UR QL STRIP: NEGATIVE
NRBC BLD AUTO-RTO: 0 /100 WBC (ref 0–0.2)
PH UR STRIP.AUTO: 6.5 [PH] (ref 5–8)
PLATELET # BLD AUTO: 303 10*3/MM3 (ref 140–450)
PMV BLD AUTO: 9.5 FL (ref 6–12)
POTASSIUM SERPL-SCNC: 4.1 MMOL/L (ref 3.5–5.2)
PROCALCITONIN SERPL-MCNC: 0.09 NG/ML (ref 0–0.25)
PROT SERPL-MCNC: 7.8 G/DL (ref 6–8.5)
PROT UR QL STRIP: ABNORMAL
RBC # BLD AUTO: 3.48 10*6/MM3 (ref 3.77–5.28)
RBC # UR STRIP: ABNORMAL /HPF
REF LAB TEST METHOD: ABNORMAL
SODIUM SERPL-SCNC: 141 MMOL/L (ref 136–145)
SP GR UR STRIP: 1.01 (ref 1–1.03)
SQUAMOUS #/AREA URNS HPF: ABNORMAL /HPF
T4 FREE SERPL-MCNC: 1.04 NG/DL (ref 0.92–1.68)
TSH SERPL DL<=0.05 MIU/L-ACNC: 8.43 UIU/ML (ref 0.27–4.2)
UROBILINOGEN UR QL STRIP: ABNORMAL
WBC # UR STRIP: ABNORMAL /HPF
WBC NRBC COR # BLD AUTO: 13 10*3/MM3 (ref 3.4–10.8)

## 2025-02-17 PROCEDURE — 63710000001 INSULIN LISPRO (HUMAN) PER 5 UNITS: Performed by: HOSPITALIST

## 2025-02-17 PROCEDURE — 99285 EMERGENCY DEPT VISIT HI MDM: CPT

## 2025-02-17 PROCEDURE — 96366 THER/PROPH/DIAG IV INF ADDON: CPT

## 2025-02-17 PROCEDURE — 83605 ASSAY OF LACTIC ACID: CPT | Performed by: EMERGENCY MEDICINE

## 2025-02-17 PROCEDURE — 25010000002 NICARDIPINE HCL IN NACL 20-0.9 MG/200ML-% SOLUTION: Performed by: EMERGENCY MEDICINE

## 2025-02-17 PROCEDURE — 83690 ASSAY OF LIPASE: CPT | Performed by: EMERGENCY MEDICINE

## 2025-02-17 PROCEDURE — G0378 HOSPITAL OBSERVATION PER HR: HCPCS

## 2025-02-17 PROCEDURE — 87040 BLOOD CULTURE FOR BACTERIA: CPT | Performed by: EMERGENCY MEDICINE

## 2025-02-17 PROCEDURE — 85025 COMPLETE CBC W/AUTO DIFF WBC: CPT | Performed by: EMERGENCY MEDICINE

## 2025-02-17 PROCEDURE — P9612 CATHETERIZE FOR URINE SPEC: HCPCS

## 2025-02-17 PROCEDURE — 81001 URINALYSIS AUTO W/SCOPE: CPT | Performed by: EMERGENCY MEDICINE

## 2025-02-17 PROCEDURE — 84443 ASSAY THYROID STIM HORMONE: CPT | Performed by: HOSPITALIST

## 2025-02-17 PROCEDURE — 80053 COMPREHEN METABOLIC PANEL: CPT | Performed by: EMERGENCY MEDICINE

## 2025-02-17 PROCEDURE — 96375 TX/PRO/DX INJ NEW DRUG ADDON: CPT

## 2025-02-17 PROCEDURE — 83735 ASSAY OF MAGNESIUM: CPT | Performed by: EMERGENCY MEDICINE

## 2025-02-17 PROCEDURE — 84145 PROCALCITONIN (PCT): CPT | Performed by: EMERGENCY MEDICINE

## 2025-02-17 PROCEDURE — 36415 COLL VENOUS BLD VENIPUNCTURE: CPT

## 2025-02-17 PROCEDURE — 82948 REAGENT STRIP/BLOOD GLUCOSE: CPT

## 2025-02-17 PROCEDURE — 82550 ASSAY OF CK (CPK): CPT | Performed by: HOSPITALIST

## 2025-02-17 PROCEDURE — 84439 ASSAY OF FREE THYROXINE: CPT | Performed by: HOSPITALIST

## 2025-02-17 PROCEDURE — 96365 THER/PROPH/DIAG IV INF INIT: CPT

## 2025-02-17 RX ORDER — DEXTROSE MONOHYDRATE 25 G/50ML
25 INJECTION, SOLUTION INTRAVENOUS
Status: DISCONTINUED | OUTPATIENT
Start: 2025-02-17 | End: 2025-02-21 | Stop reason: HOSPADM

## 2025-02-17 RX ORDER — INSULIN LISPRO 100 [IU]/ML
4-24 INJECTION, SOLUTION INTRAVENOUS; SUBCUTANEOUS
Status: DISCONTINUED | OUTPATIENT
Start: 2025-02-17 | End: 2025-02-21 | Stop reason: HOSPADM

## 2025-02-17 RX ORDER — LABETALOL HYDROCHLORIDE 5 MG/ML
10 INJECTION, SOLUTION INTRAVENOUS ONCE AS NEEDED
Status: DISCONTINUED | OUTPATIENT
Start: 2025-02-17 | End: 2025-02-18

## 2025-02-17 RX ORDER — HYDRALAZINE HYDROCHLORIDE 25 MG/1
25 TABLET, FILM COATED ORAL EVERY 6 HOURS PRN
Status: DISCONTINUED | OUTPATIENT
Start: 2025-02-17 | End: 2025-02-18

## 2025-02-17 RX ORDER — LABETALOL HYDROCHLORIDE 5 MG/ML
20 INJECTION, SOLUTION INTRAVENOUS ONCE
Status: COMPLETED | OUTPATIENT
Start: 2025-02-17 | End: 2025-02-17

## 2025-02-17 RX ORDER — IBUPROFEN 600 MG/1
1 TABLET ORAL
Status: DISCONTINUED | OUTPATIENT
Start: 2025-02-17 | End: 2025-02-21 | Stop reason: HOSPADM

## 2025-02-17 RX ORDER — NICOTINE POLACRILEX 4 MG
15 LOZENGE BUCCAL
Status: DISCONTINUED | OUTPATIENT
Start: 2025-02-17 | End: 2025-02-21 | Stop reason: HOSPADM

## 2025-02-17 RX ADMIN — NICARDIPINE HYDROCHLORIDE 5 MG/HR: 0.1 INJECTION INTRAVENOUS at 18:32

## 2025-02-17 RX ADMIN — INSULIN LISPRO 8 UNITS: 100 INJECTION, SOLUTION INTRAVENOUS; SUBCUTANEOUS at 23:24

## 2025-02-17 RX ADMIN — Medication 20 MG: at 17:03

## 2025-02-17 NOTE — CASE MANAGEMENT/SOCIAL WORK
Discharge Planning Assessment  [facility]     Patient Name: Halie Guidry  MRN: [id number]  Today's Date: 2/17/2025    Admit Date: 2/17/2025        Discharge Needs Assessment    No documentation.                  Discharge Plan       Row Name 02/17/25 1547       Plan    Plan Comments Reached out to patient's senior living facility, [facility], and requested call back from admissions coordinator to discuss patient appropriateness to return to [facility]. Awaiting return call to discuss. HEENA Ruggiero RN                  Continued Care and Services - Admitted Since 2/17/2025    No active coordination exists for this encounter.       Selected Continued Care - Episodes Includes continued care and service providers with selected services from the active episodes listed below      [facility] Episode start date: 9/10/2024   There are no active outsourced providers for this episode.                 Selected Continued Care - Prior Encounters Includes continued care and service providers with selected services from prior encounters from 11/19/2024 to 2/17/2025      Discharged on 1/21/2025 Admission date: 1/14/2025 - Discharge disposition: Skilled Nursing Facility (DC - External)      Destination       Service Provider Services Address Phone Fax Patient Preferred    [facility] [place] [facility] [address], [facility] [address] [phone] --              Home Medical Care       Service Provider Services Address Phone Fax Patient Preferred    [facility] [address], [address], [facility] [address] [phone] --                             Demographic Summary    No documentation.                  Functional Status    No documentation.                  Psychosocial    No documentation.                  Abuse/Neglect    No documentation.                  Legal    No documentation.                   Substance Abuse    No documentation.                  Patient Forms    No documentation.                     Alon Hernandez RN

## 2025-02-17 NOTE — ED PROVIDER NOTES
EMERGENCY DEPARTMENT ENCOUNTER  Room Number:  S518/1  Date of encounter:  2/18/2025  PCP: Napoleon Mead MD  Patient Care Team:  Napoleon Mead MD as PCP - General (Family Medicine)  Lilia Orona APRN as Nurse Practitioner (Nurse Practitioner)  Beena Laguerre APRN as Referring Physician (Gastroenterology)  Napoleon Gutierrez MD as Consulting Physician (Hematology and Oncology)  Stefan Gutierrez MD as Consulting Physician (Pain Medicine)  Merlyn Mon APRN as Nurse Practitioner (Nurse Practitioner)  Christina Wells APRN as Nurse Practitioner (Nephrology)     HPI:  Context: Halie Guidry is a 84 y.o. female who presents to the ED c/o chief complaint of generalized weakness and diarrhea.  Patient reports that she was recently admitted for right lower extremity infection, afterwards was discharged to rehab, was just discharged from rehab on Saturday, currently at assisted living.  Patient reports that she continues to be extremely weak, has difficulty ambulating.  Patient reports that she had extremely large bowel movement today, required assistance, was told that she was too weak to stay at facility.  Patient denied any blood or mucus in her stool, no abdominal pain, no nausea vomiting.  Patient denies any fevers.  Patient reports that she was recently treated for urinary tract infection.    MEDICAL HISTORY REVIEW  Reviewed in EPIC    PAST MEDICAL HISTORY  Active Ambulatory Problems     Diagnosis Date Noted    Compression fracture 04/22/2009    Lumbar degenerative disc disease 07/05/2012    Type 2 diabetes mellitus with hyperglycemia, with long-term current use of insulin     Hyperlipidemia     Benign essential hypertension 08/20/2014    Primary hypothyroidism     Neuropathy     Osteoporosis 09/09/2015    Proteinuria 02/28/2012    Tobacco abuse 08/22/2013    Generalized weakness 05/27/2017    Spinal stenosis 05/27/2017    Scoliosis 05/27/2017    Arthritis 05/27/2017    VBI (vertebrobasilar  insufficiency) 05/28/2017    Orthostatic hypotension 05/28/2017    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 05/28/2017    Severe hypothyroidism 05/30/2017    Noncompliance with medication regimen 05/30/2017    Vitamin D deficiency disease 06/01/2017    Tremor 01/09/2018    Seizure 05/21/2019    Thoracic degenerative disc disease 01/27/2020    Acute kidney injury (VIPUL) with acute tubular necrosis (ATN) 12/02/2021    Hyperglycemia 12/02/2021    Type 2 diabetes mellitus with hyperglycemia 12/02/2021    Lower abdominal pain 02/23/2022    Transaminitis 02/23/2022    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    Choledocholithiasis 02/23/2022    Hypokalemia 02/24/2022    Hypoxia 02/24/2022    Generalized abdominal pain 03/26/2022    History of Clostridium difficile infection 03/26/2022    History of ERCP 03/26/2022    Hypomagnesemia 03/28/2022    Anxiety disorder 05/27/2022    Neuropathic pain 05/27/2022    Intertrigo 05/27/2022    Weakness of both lower extremities 06/24/2022    Accelerated hypertension 09/01/2022    Acute cystitis without hematuria 09/06/2022    Left lower lobe pneumonia 11/04/2022    Cellulitis and abscess of left lower extremity 05/15/2023    Benign hypertension with CKD (chronic kidney disease) stage IV 06/02/2023    Peripheral edema 07/30/2023    Primary malignant neuroendocrine tumor of pancreas 08/24/2023    Encounter for long-term (current) use of high-risk medication 08/28/2023    Diabetic foot ulcer 12/24/2023    CKD (chronic kidney disease) stage 4, GFR 15-29 ml/min 01/28/2024    Pressure injury of buttock, stage 2 01/28/2024    HTN (hypertension) 04/21/2024    Anemia due to chronic kidney disease 05/17/2024    Bilateral lower extremity edema 08/24/2024    Cellulitis of right lower extremity 08/24/2024    CKD (chronic kidney disease) stage 4, GFR 15-29 ml/min 08/24/2024    Acute CHF 08/24/2024    Bilateral cellulitis of lower leg 08/25/2024    Acute UTI 10/10/2024    UTI (urinary tract  infection) 10/10/2024    Acute UTI (urinary tract infection) 10/11/2024    Metabolic acidosis 10/11/2024    Cobalamin deficiency 11/30/2022    Dependent edema 11/30/2022    Gastroesophageal reflux disease 11/30/2022    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    Pancreatic neoplasm 08/17/2023    Congestive heart failure 11/30/2022    CHF exacerbation 11/05/2024    Acute exacerbation of CHF (congestive heart failure) 11/05/2024     Resolved Ambulatory Problems     Diagnosis Date Noted    Leukocytosis     Diabetic eye exam 02/12/2014    Altered mental status 12/15/2017    Stage 3a chronic kidney disease 03/09/2012    Metabolic encephalopathy 12/02/2021    COVID-19 virus detected 02/23/2022    UTI (urinary tract infection) 02/23/2022    Acute UTI (urinary tract infection) 03/27/2022    Burning pain 05/27/2022    Acute metabolic encephalopathy 06/22/2022    Hypoglycemia 06/23/2022    Acute metabolic encephalopathy 06/24/2022    Altered mental status, unspecified altered mental status type 11/04/2022    Diarrhea 11/04/2022    Acute pain of left foot 05/13/2023    Bilateral leg pain 07/21/2023    Acute renal insufficiency 01/09/2024    COVID-19 01/10/2024    Cytokine release syndrome, grade 1 01/15/2024    C. difficile diarrhea 01/15/2024    Sepsis 01/27/2024    Metabolic encephalopathy 01/28/2024    Uncontrolled diabetes mellitus with hyperglycemia 04/20/2024    Pseudohyponatremia 04/21/2024     Past Medical History:   Diagnosis Date    Anxiety     Bleeding disorder     Depression     Diabetes     Diabetes mellitus, type 2     Disc degeneration, lumbar     Headache, tension-type     Hypothyroidism     Pancreatic mass     Peripheral neuropathy     Rotator cuff tear, left     Shoulder pain        PAST SURGICAL HISTORY  Past Surgical History:   Procedure Laterality Date    APPENDECTOMY      BILATERAL BREAST REDUCTION Bilateral 08/2015    CATARACT EXTRACTION  03/2015    CHOLECYSTECTOMY WITH INTRAOPERATIVE CHOLANGIOGRAM N/A  3/27/2022    Procedure: CHOLECYSTECTOMY LAPAROSCOPIC INTRAOPERATIVE CHOLANGIOGRAM;  Surgeon: Aiyana Hill MD;  Location: [facility] MAIN OR;  Service: General;  Laterality: N/A;    COLONOSCOPY  2015    WNL    ERCP N/A 2022    Procedure: ENDOSCOPIC RETROGRADE CHOLANGIOPANCREATOGRAPHY with sphincterotomy and balloon sweep;  Surgeon: Chilo Wilhelm MD;  Location: [facility] ENDOSCOPY;  Service: Gastroenterology;  Laterality: N/A;  PRE/POST - CBD stones    ERCP N/A 3/28/2022    Procedure: ENDOSCOPIC RETROGRADE CHOLANGIOPANCREATOGRAPHY WITH SPHINCTEROTOMY AND BALLOON SWEEP;  Surgeon: Chilo Wilhelm MD;  Location: [facility] ENDOSCOPY;  Service: Gastroenterology;  Laterality: N/A;  PRE: COMMON DUCT STONE  POST: COMMON DUCT STONE    KYPHOPLASTY      REDUCTION MAMMAPLASTY      TONSILLECTOMY         FAMILY HISTORY  Family History   Problem Relation Age of Onset    Bone cancer Mother     No Known Problems Father     Heart disease Daughter        SOCIAL HISTORY  Social History     Socioeconomic History    Marital status:     Number of children: 3   Tobacco Use    Smoking status: Former     Current packs/day: 0.00     Average packs/day: 1 pack/day for 40.0 years (40.0 ttl pk-yrs)     Types: Cigarettes     Start date:      Quit date:      Years since quittin.1    Smokeless tobacco: Never   Vaping Use    Vaping status: Never Used   Substance and Sexual Activity    Alcohol use: No    Drug use: No    Sexual activity: Defer       ALLERGIES  Sulfa antibiotics    The patient's allergies have been reviewed    REVIEW OF SYSTEMS  All systems reviewed and negative except for those discussed in HPI.     PHYSICAL EXAM  I have reviewed the triage vital signs and nursing notes.  ED Triage Vitals [25 1253]   Temp Heart Rate Resp BP SpO2   98.7 °F (37.1 °C) 78 20 (!) 186/67 96 %      Temp src Heart Rate Source Patient Position BP Location FiO2 (%)   Oral -- -- -- --       General: No acute  distress.  HENT: NCAT, PERRL, Nares patent.  Eyes: no scleral icterus.  Neck: trachea midline, no ROM limitations.  CV: regular rhythm, regular rate.  Respiratory: normal effort, CTAB.  Abdomen: soft, nondistended, NTTP, no rebound tenderness, no guarding or rigidity.  Musculoskeletal: no deformity.  Neuro: alert, moves all extremities, follows commands.  Skin: warm, dry.    LAB RESULTS  Recent Results (from the past 24 hours)   POC Glucose Once    Collection Time: 02/17/25 11:05 PM    Specimen: Blood   Result Value Ref Range    Glucose 230 (H) 70 - 130 mg/dL   Basic Metabolic Panel    Collection Time: 02/18/25  5:57 AM    Specimen: Arm, Left; Blood   Result Value Ref Range    Glucose 94 65 - 99 mg/dL    BUN 45 (H) 8 - 23 mg/dL    Creatinine 2.06 (H) 0.57 - 1.00 mg/dL    Sodium 142 136 - 145 mmol/L    Potassium 3.9 3.5 - 5.2 mmol/L    Chloride 107 98 - 107 mmol/L    CO2 22.0 22.0 - 29.0 mmol/L    Calcium 9.0 8.6 - 10.5 mg/dL    BUN/Creatinine Ratio 21.8 7.0 - 25.0    Anion Gap 13.0 5.0 - 15.0 mmol/L    eGFR 23.4 (L) >60.0 mL/min/1.73   Iron Profile    Collection Time: 02/18/25  5:57 AM    Specimen: Arm, Left; Blood   Result Value Ref Range    Iron 30 (L) 37 - 145 mcg/dL    Iron Saturation (TSAT) 10 (L) 20 - 50 %    Transferrin 201 200 - 360 mg/dL    TIBC 299 298 - 536 mcg/dL   POC Glucose Once    Collection Time: 02/18/25 11:23 AM    Specimen: Blood   Result Value Ref Range    Glucose 180 (H) 70 - 130 mg/dL   POC Glucose Once    Collection Time: 02/18/25  4:41 PM    Specimen: Blood   Result Value Ref Range    Glucose 147 (H) 70 - 130 mg/dL   POC Glucose Once    Collection Time: 02/18/25  9:12 PM    Specimen: Blood   Result Value Ref Range    Glucose 195 (H) 70 - 130 mg/dL       I ordered the above labs and reviewed the results.    RADIOLOGY  No Radiology Exams Resulted Within Past 24 Hours    I ordered the above noted radiological studies. I reviewed the images and results. I agree with the radiologist  interpretation.    PROCEDURES  Procedures    MEDICATIONS GIVEN IN ER  Medications   atorvastatin (LIPITOR) tablet 80 mg (80 mg Oral Given 2/18/25 2115)   bisoprolol (ZEBeta) tablet 5 mg (5 mg Oral Given 2/18/25 1006)   DULoxetine (CYMBALTA) DR capsule 30 mg (30 mg Oral Given 2/18/25 1006)   ferrous sulfate tablet 325 mg (has no administration in time range)   fluticasone (FLONASE) 50 MCG/ACT nasal spray 2 spray (2 sprays Nasal Given 2/18/25 1005)   pantoprazole (PROTONIX) EC tablet 40 mg (40 mg Oral Given 2/18/25 0547)   pregabalin (LYRICA) capsule 75 mg (75 mg Oral Given 2/18/25 2115)   torsemide (DEMADEX) tablet 40 mg (40 mg Oral Given 2/18/25 1006)   sodium chloride 0.9 % flush 10 mL (10 mL Intravenous Given 2/18/25 2114)   sodium chloride 0.9 % flush 10 mL (has no administration in time range)   sodium chloride 0.9 % infusion 40 mL (has no administration in time range)   heparin (porcine) 5000 UNIT/ML injection 5,000 Units (5,000 Units Subcutaneous Given 2/18/25 2114)   acetaminophen (TYLENOL) tablet 650 mg (650 mg Oral Given 2/18/25 0124)     Or   acetaminophen (TYLENOL) 160 MG/5ML oral solution 650 mg ( Oral Not Given:  See Alt 2/18/25 0124)     Or   acetaminophen (TYLENOL) suppository 650 mg ( Rectal Not Given:  See Alt 2/18/25 0124)   sennosides-docusate (PERICOLACE) 8.6-50 MG per tablet 2 tablet (has no administration in time range)     And   polyethylene glycol (MIRALAX) packet 17 g (has no administration in time range)     And   bisacodyl (DULCOLAX) EC tablet 5 mg (has no administration in time range)     And   bisacodyl (DULCOLAX) suppository 10 mg (has no administration in time range)   ondansetron ODT (ZOFRAN-ODT) disintegrating tablet 4 mg (has no administration in time range)     Or   ondansetron (ZOFRAN) injection 4 mg (has no administration in time range)   dextrose (GLUTOSE) oral gel 15 g (has no administration in time range)   dextrose (D50W) (25 g/50 mL) IV injection 25 g (has no administration  in time range)   glucagon (GLUCAGEN) injection 1 mg (has no administration in time range)   insulin lispro (HUMALOG/ADMELOG) injection 4-24 Units (4 Units Subcutaneous Given 2/18/25 2131)   insulin glargine (LANTUS, SEMGLEE) injection 72 Units (72 Units Subcutaneous Given 2/18/25 1007)   levothyroxine (SYNTHROID, LEVOTHROID) tablet 150 mcg (has no administration in time range)   amLODIPine (NORVASC) tablet 5 mg (5 mg Oral Given 2/18/25 1006)   hydrALAZINE (APRESOLINE) tablet 50 mg (50 mg Oral Given 2/18/25 2114)   labetalol (NORMODYNE,TRANDATE) injection 20 mg (20 mg Intravenous Given 2/17/25 1703)       PROGRESS, DATA ANALYSIS, CONSULTS, AND MEDICAL DECISION MAKING  A complete history and physical exam have been performed.  All available laboratory and imaging results have been reviewed by myself prior to disposition.    MDM    After the initial H&P, I discussed pertinent information from history and physical exam with patient/family.  Discussed differential diagnosis.  Discussed plan for ED evaluation/workup/treatment.  All questions answered.  Patient/family is agreeable with plan.  ED Course as of 02/18/25 2228   Mon Feb 17, 2025   1258 Blood pressure elevated, will observe but ordering as needed labetalol in case blood pressure continues to be elevated. [JG]   1326 My differential diagnosis for generalized weakness includes but is not limited to:  Neuromuscular weakness   CVA  Hemorrhagic stroke  Multiple sclerosis  Amyotrophic Lateral Sclerosis (ALS) (UMN & LMN)  Spinal and bulbar muscular atrophy (Marcello's syndrome)  Spinal cord disease: Infection (Epidural abscess)  Infarction/ischemia  Trauma (Spinal Cord Syndromes)  Inflammation (Transverse Myelitis)  Degenerative (Spinal muscular atrophy)  Tumor  Peripheral nerve disease: Guillain-[place] syndrome  Toxins ([place])  Tick paralysis  Diabetic peripheral neuropathy  NMJ disease: Myasthenia gravis crisis  Botulism  Organophosphate toxicity  Lambert-Eaton  myasthenic syndrome  Rhabdomyolysis  Dermatomyositis  Polymyositis  Alcoholic myopathy  Non-neuromuscular weakness   ACS  Arrhythmia/Syncope  Severe infection/Sepsis  Hypoglycemia  Periodic paralysis (electrolyte disturbance, K, Mg, Ca)   Hypokalemic periodic paralysis  Thyrotoxic periodic paralysis  Respiratory failure  Symptomatic Anemia  Severe dehydration  Hypothyroidism  Polypharmacy  Malignancy           [JG]   1405 Phone call with ODIN Chi nurse.  Discussed the patient, relevant history, exam, diagnostics, ED findings/progress, and concerns.  He states he will investigate further and call facility. [JG]   1501 Blood pressure continued to elevate, giving 20 of labetalol. [JG]   1600 Nursing staff attempted to stand and ambulate patient, patient able stand with significant assistance, patient unable to ambulate. [JG]   1758 [facility] nurse has spoken with facility, patient currently assisted living, they are unable to care for patient if she is unable ambulate.  Patient unable to ambulate here.  CCP request admission to the hospitalist with plans for placement.  Consulting hospitalist for admission. [JG]   1813 No improvement of blood pressure after initial dose of labetalol, starting on nicardipine drip. [JG]   1927 Case discussed with MADINA Llanes, and she agrees to admit the patient to Dr. Cowan []      ED Course User Index  [JG] Gopi Fiore MD  [] Everton Perez MD       AS OF 22:28 EST VITALS:    BP - (!) 185/66  HR - 81  TEMP - 97.7 °F (36.5 °C) (Oral)  O2 SATS - 94%    DIAGNOSIS  Final diagnoses:   General weakness   Inability to walk   Leukocytosis, unspecified type   Anemia, unspecified type   Chronic kidney disease, unspecified CKD stage   Hyperglycemia   Accelerated hypertension         DISPOSITION  ADMISSION    Discussed treatment plan and reason for admission with pt/family and admitting physician.  Pt/family voiced understanding of the plan for admission for further  testing/treatment as needed.        Gopi Fiore MD  02/18/25 [id number]

## 2025-02-17 NOTE — CASE MANAGEMENT/SOCIAL WORK
Discharge Planning Assessment  [facility]     Patient Name: Halie Guidry  MRN: [id number]  Today's Date: 2/17/2025    Admit Date: 2/17/2025        Discharge Needs Assessment    No documentation.                  Discharge Plan       Row Name 02/17/25 1605       Plan    Plan Comments Patient's primary RN received return call from admissions director at [facility]. She informs that patient has been walking with walker since admission, but today was so weak that she couldn't even open her own medications. When primary RN here attempted to ambulate patient, she was only able to stand, but could not take any steps. Per [facility], they will be unable to accept patient back if she is unable to ambulate. HEENA Ruggiero RN      Row Name 02/17/25 1135       Plan    Plan Comments Reached out to patient's senior [facility] facility, [facility], and requested call back from admissions coordinator to discuss patient appropriateness to return to [facility]. Awaiting return call to discuss. HEENA Ruggiero RN                  Continued Care and Services - Admitted Since 2/17/2025    No active coordination exists for this encounter.       Selected Continued Care - Episodes Includes continued care and service providers with selected services from the active episodes listed below      [facility] Episode start date: 9/10/2024   There are no active outsourced providers for this episode.                 Selected Continued Care - Prior Encounters Includes continued care and service providers with selected services from prior encounters from 11/19/2024 to 2/17/2025      Discharged on 1/21/2025 Admission date: 1/14/2025 - Discharge disposition: Skilled Nursing Facility (DC - External)      Destination       Service Provider Services Address Phone Fax Patient Preferred    [facility] [address] , [facility] [address] [phone] --               Home Medical Care       Service Provider Services Address Phone Fax Patient Preferred    [facility] [address], [address], [facility] [address] [phone] --                             Demographic Summary    No documentation.                  Functional Status    No documentation.                  Psychosocial    No documentation.                  Abuse/Neglect    No documentation.                  Legal    No documentation.                  Substance Abuse    No documentation.                  Patient Forms    No documentation.                     Alon Hernandez RN

## 2025-02-18 LAB
ANION GAP SERPL CALCULATED.3IONS-SCNC: 13 MMOL/L (ref 5–15)
BUN SERPL-MCNC: 45 MG/DL (ref 8–23)
BUN/CREAT SERPL: 21.8 (ref 7–25)
CALCIUM SPEC-SCNC: 9 MG/DL (ref 8.6–10.5)
CHLORIDE SERPL-SCNC: 107 MMOL/L (ref 98–107)
CO2 SERPL-SCNC: 22 MMOL/L (ref 22–29)
CREAT SERPL-MCNC: 2.06 MG/DL (ref 0.57–1)
EGFRCR SERPLBLD CKD-EPI 2021: 23.4 ML/MIN/1.73
GLUCOSE BLDC GLUCOMTR-MCNC: 147 MG/DL (ref 70–130)
GLUCOSE BLDC GLUCOMTR-MCNC: 180 MG/DL (ref 70–130)
GLUCOSE BLDC GLUCOMTR-MCNC: 195 MG/DL (ref 70–130)
GLUCOSE SERPL-MCNC: 94 MG/DL (ref 65–99)
IRON 24H UR-MRATE: 30 MCG/DL (ref 37–145)
IRON SATN MFR SERPL: 10 % (ref 20–50)
POTASSIUM SERPL-SCNC: 3.9 MMOL/L (ref 3.5–5.2)
SODIUM SERPL-SCNC: 142 MMOL/L (ref 136–145)
TIBC SERPL-MCNC: 299 MCG/DL (ref 298–536)
TRANSFERRIN SERPL-MCNC: 201 MG/DL (ref 200–360)

## 2025-02-18 PROCEDURE — 97530 THERAPEUTIC ACTIVITIES: CPT

## 2025-02-18 PROCEDURE — 96372 THER/PROPH/DIAG INJ SC/IM: CPT

## 2025-02-18 PROCEDURE — 25010000002 HEPARIN (PORCINE) PER 1000 UNITS: Performed by: HOSPITALIST

## 2025-02-18 PROCEDURE — 80048 BASIC METABOLIC PNL TOTAL CA: CPT | Performed by: HOSPITALIST

## 2025-02-18 PROCEDURE — 84466 ASSAY OF TRANSFERRIN: CPT | Performed by: HOSPITALIST

## 2025-02-18 PROCEDURE — G0378 HOSPITAL OBSERVATION PER HR: HCPCS

## 2025-02-18 PROCEDURE — 63710000001 INSULIN LISPRO (HUMAN) PER 5 UNITS: Performed by: HOSPITALIST

## 2025-02-18 PROCEDURE — 97166 OT EVAL MOD COMPLEX 45 MIN: CPT

## 2025-02-18 PROCEDURE — 63710000001 INSULIN GLARGINE PER 5 UNITS: Performed by: NURSE PRACTITIONER

## 2025-02-18 PROCEDURE — 87481 CANDIDA DNA AMP PROBE: CPT | Performed by: HOSPITALIST

## 2025-02-18 PROCEDURE — 83540 ASSAY OF IRON: CPT | Performed by: HOSPITALIST

## 2025-02-18 PROCEDURE — 97535 SELF CARE MNGMENT TRAINING: CPT

## 2025-02-18 PROCEDURE — 82948 REAGENT STRIP/BLOOD GLUCOSE: CPT

## 2025-02-18 RX ORDER — TORSEMIDE 20 MG/1
40 TABLET ORAL DAILY
Status: DISCONTINUED | OUTPATIENT
Start: 2025-02-18 | End: 2025-02-21 | Stop reason: HOSPADM

## 2025-02-18 RX ORDER — SODIUM CHLORIDE 0.9 % (FLUSH) 0.9 %
10 SYRINGE (ML) INJECTION AS NEEDED
Status: DISCONTINUED | OUTPATIENT
Start: 2025-02-18 | End: 2025-02-21 | Stop reason: HOSPADM

## 2025-02-18 RX ORDER — BISACODYL 10 MG
10 SUPPOSITORY, RECTAL RECTAL DAILY PRN
Status: DISCONTINUED | OUTPATIENT
Start: 2025-02-18 | End: 2025-02-21 | Stop reason: HOSPADM

## 2025-02-18 RX ORDER — LEVOTHYROXINE SODIUM 137 UG/1
137 TABLET ORAL
Status: DISCONTINUED | OUTPATIENT
Start: 2025-02-18 | End: 2025-02-18

## 2025-02-18 RX ORDER — HYDRALAZINE HYDROCHLORIDE 50 MG/1
50 TABLET, FILM COATED ORAL EVERY 8 HOURS SCHEDULED
Status: DISCONTINUED | OUTPATIENT
Start: 2025-02-18 | End: 2025-02-21 | Stop reason: HOSPADM

## 2025-02-18 RX ORDER — PREGABALIN 75 MG/1
75 CAPSULE ORAL 2 TIMES DAILY
Status: DISCONTINUED | OUTPATIENT
Start: 2025-02-18 | End: 2025-02-21 | Stop reason: HOSPADM

## 2025-02-18 RX ORDER — HEPARIN SODIUM 5000 [USP'U]/ML
5000 INJECTION, SOLUTION INTRAVENOUS; SUBCUTANEOUS EVERY 12 HOURS SCHEDULED
Status: DISCONTINUED | OUTPATIENT
Start: 2025-02-18 | End: 2025-02-21 | Stop reason: HOSPADM

## 2025-02-18 RX ORDER — ATORVASTATIN CALCIUM 80 MG/1
80 TABLET, FILM COATED ORAL NIGHTLY
Status: DISCONTINUED | OUTPATIENT
Start: 2025-02-18 | End: 2025-02-21 | Stop reason: HOSPADM

## 2025-02-18 RX ORDER — BISOPROLOL FUMARATE 5 MG/1
5 TABLET, FILM COATED ORAL DAILY
Status: DISCONTINUED | OUTPATIENT
Start: 2025-02-18 | End: 2025-02-21 | Stop reason: HOSPADM

## 2025-02-18 RX ORDER — LEVOTHYROXINE SODIUM 75 UG/1
150 TABLET ORAL
Status: DISCONTINUED | OUTPATIENT
Start: 2025-02-19 | End: 2025-02-21 | Stop reason: HOSPADM

## 2025-02-18 RX ORDER — ONDANSETRON 2 MG/ML
4 INJECTION INTRAMUSCULAR; INTRAVENOUS EVERY 6 HOURS PRN
Status: DISCONTINUED | OUTPATIENT
Start: 2025-02-18 | End: 2025-02-21 | Stop reason: HOSPADM

## 2025-02-18 RX ORDER — AMOXICILLIN 250 MG
2 CAPSULE ORAL 2 TIMES DAILY PRN
Status: DISCONTINUED | OUTPATIENT
Start: 2025-02-18 | End: 2025-02-21 | Stop reason: HOSPADM

## 2025-02-18 RX ORDER — ONDANSETRON 4 MG/1
4 TABLET, ORALLY DISINTEGRATING ORAL EVERY 6 HOURS PRN
Status: DISCONTINUED | OUTPATIENT
Start: 2025-02-18 | End: 2025-02-21 | Stop reason: HOSPADM

## 2025-02-18 RX ORDER — ACETAMINOPHEN 650 MG/1
650 SUPPOSITORY RECTAL EVERY 4 HOURS PRN
Status: DISCONTINUED | OUTPATIENT
Start: 2025-02-18 | End: 2025-02-21 | Stop reason: HOSPADM

## 2025-02-18 RX ORDER — FERROUS SULFATE 325(65) MG
325 TABLET ORAL EVERY OTHER DAY
Status: DISCONTINUED | OUTPATIENT
Start: 2025-02-19 | End: 2025-02-21 | Stop reason: HOSPADM

## 2025-02-18 RX ORDER — POLYETHYLENE GLYCOL 3350 17 G/17G
17 POWDER, FOR SOLUTION ORAL DAILY PRN
Status: DISCONTINUED | OUTPATIENT
Start: 2025-02-18 | End: 2025-02-21 | Stop reason: HOSPADM

## 2025-02-18 RX ORDER — AMLODIPINE BESYLATE 5 MG/1
5 TABLET ORAL
Status: DISCONTINUED | OUTPATIENT
Start: 2025-02-18 | End: 2025-02-21 | Stop reason: HOSPADM

## 2025-02-18 RX ORDER — BISACODYL 5 MG/1
5 TABLET, DELAYED RELEASE ORAL DAILY PRN
Status: DISCONTINUED | OUTPATIENT
Start: 2025-02-18 | End: 2025-02-21 | Stop reason: HOSPADM

## 2025-02-18 RX ORDER — ACETAMINOPHEN 160 MG/5ML
650 SOLUTION ORAL EVERY 4 HOURS PRN
Status: DISCONTINUED | OUTPATIENT
Start: 2025-02-18 | End: 2025-02-21 | Stop reason: HOSPADM

## 2025-02-18 RX ORDER — FLUTICASONE PROPIONATE 50 MCG
2 SPRAY, SUSPENSION (ML) NASAL DAILY
Status: DISCONTINUED | OUTPATIENT
Start: 2025-02-18 | End: 2025-02-21 | Stop reason: HOSPADM

## 2025-02-18 RX ORDER — SODIUM CHLORIDE 0.9 % (FLUSH) 0.9 %
10 SYRINGE (ML) INJECTION EVERY 12 HOURS SCHEDULED
Status: DISCONTINUED | OUTPATIENT
Start: 2025-02-18 | End: 2025-02-21 | Stop reason: HOSPADM

## 2025-02-18 RX ORDER — PANTOPRAZOLE SODIUM 40 MG/1
40 TABLET, DELAYED RELEASE ORAL
Status: DISCONTINUED | OUTPATIENT
Start: 2025-02-18 | End: 2025-02-21 | Stop reason: HOSPADM

## 2025-02-18 RX ORDER — SODIUM CHLORIDE 9 MG/ML
40 INJECTION, SOLUTION INTRAVENOUS AS NEEDED
Status: DISCONTINUED | OUTPATIENT
Start: 2025-02-18 | End: 2025-02-21 | Stop reason: HOSPADM

## 2025-02-18 RX ORDER — ACETAMINOPHEN 325 MG/1
650 TABLET ORAL EVERY 4 HOURS PRN
Status: DISCONTINUED | OUTPATIENT
Start: 2025-02-18 | End: 2025-02-21 | Stop reason: HOSPADM

## 2025-02-18 RX ORDER — DULOXETIN HYDROCHLORIDE 30 MG/1
30 CAPSULE, DELAYED RELEASE ORAL DAILY
Status: DISCONTINUED | OUTPATIENT
Start: 2025-02-18 | End: 2025-02-21 | Stop reason: HOSPADM

## 2025-02-18 RX ADMIN — HEPARIN SODIUM 5000 UNITS: 5000 INJECTION INTRAVENOUS; SUBCUTANEOUS at 10:07

## 2025-02-18 RX ADMIN — ATORVASTATIN CALCIUM 80 MG: 80 TABLET, FILM COATED ORAL at 01:22

## 2025-02-18 RX ADMIN — ATORVASTATIN CALCIUM 80 MG: 80 TABLET, FILM COATED ORAL at 21:15

## 2025-02-18 RX ADMIN — INSULIN GLARGINE 72 UNITS: 100 INJECTION, SOLUTION SUBCUTANEOUS at 10:07

## 2025-02-18 RX ADMIN — PREGABALIN 75 MG: 50 CAPSULE ORAL at 01:22

## 2025-02-18 RX ADMIN — BISOPROLOL FUMARATE 5 MG: 5 TABLET ORAL at 10:06

## 2025-02-18 RX ADMIN — HEPARIN SODIUM 5000 UNITS: 5000 INJECTION INTRAVENOUS; SUBCUTANEOUS at 21:14

## 2025-02-18 RX ADMIN — PREGABALIN 75 MG: 50 CAPSULE ORAL at 21:15

## 2025-02-18 RX ADMIN — FLUTICASONE PROPIONATE 2 SPRAY: 50 SPRAY, METERED NASAL at 10:05

## 2025-02-18 RX ADMIN — HYDRALAZINE HYDROCHLORIDE 25 MG: 25 TABLET ORAL at 02:06

## 2025-02-18 RX ADMIN — DULOXETINE HYDROCHLORIDE 30 MG: 30 CAPSULE, DELAYED RELEASE ORAL at 10:06

## 2025-02-18 RX ADMIN — Medication 10 ML: at 21:14

## 2025-02-18 RX ADMIN — HYDRALAZINE HYDROCHLORIDE 50 MG: 50 TABLET ORAL at 17:00

## 2025-02-18 RX ADMIN — Medication 10 ML: at 10:14

## 2025-02-18 RX ADMIN — INSULIN LISPRO 4 UNITS: 100 INJECTION, SOLUTION INTRAVENOUS; SUBCUTANEOUS at 21:31

## 2025-02-18 RX ADMIN — HYDRALAZINE HYDROCHLORIDE 50 MG: 50 TABLET ORAL at 21:14

## 2025-02-18 RX ADMIN — HYDRALAZINE HYDROCHLORIDE 25 MG: 25 TABLET ORAL at 11:44

## 2025-02-18 RX ADMIN — ACETAMINOPHEN 650 MG: 325 TABLET, FILM COATED ORAL at 01:24

## 2025-02-18 RX ADMIN — AMLODIPINE BESYLATE 5 MG: 5 TABLET ORAL at 10:06

## 2025-02-18 RX ADMIN — LEVOTHYROXINE SODIUM 137 MCG: 137 TABLET ORAL at 05:47

## 2025-02-18 RX ADMIN — PANTOPRAZOLE SODIUM 40 MG: 40 TABLET, DELAYED RELEASE ORAL at 05:47

## 2025-02-18 RX ADMIN — TORSEMIDE 40 MG: 20 TABLET ORAL at 10:06

## 2025-02-18 RX ADMIN — INSULIN LISPRO 4 UNITS: 100 INJECTION, SOLUTION INTRAVENOUS; SUBCUTANEOUS at 11:44

## 2025-02-18 RX ADMIN — PREGABALIN 75 MG: 50 CAPSULE ORAL at 10:06

## 2025-02-18 NOTE — NURSING NOTE
WOCN consult: Patient has wound left posterior calf, 2 small wounds, pink with small amount slough. Patient was admitted recently for cellulitis. She was discharged to rehab, Wounds are healing, scant amount tan drainage.  Patient states they took good care of the wounds in rehab. Cleansed wounds with saline, applied hydrofiber silver cover with silicone border dressing. Recommend to perform every other day. Recommendations placed in epic.

## 2025-02-18 NOTE — H&P
HISTORY AND PHYSICAL   [facility]        Patient Identification:  Name: Halie Guidry  Age: 84 y.o.  Sex: female  :  1940  MRN: [id number]                     Primary Care Physician: Napoleon Mead MD    Chief Complaint: Increasing poor mobility    History of Present Illness:   84-year-old female resident of a assisted living facility.  She has a longstanding history of poor mobility and has been using a motorized wheelchair.  She has a multitude of medical issues including morbid obesity, poorly controlled diabetes, chronic lower extremity edema.... She was seen in this facility 2025 through 2025 with an infected wound on the right shin that was polymicrobial.  She was discharged to rehab facility and very recently discharged from that facility back to her assisted living.  She did note that she was constipated at the rehab facility but had a very large bowel movement last night.  Her mobility has been declining and appears that she is at a point where she is having difficulty managing getting into her wheelchair by herself.  She states she was told by the staff at the assisted living facility that she had to go to the hospital.    On presentation emergency room she was noted to be markedly hypertensive and was put on a Cardene drip which has since been weaned off again.  At present she has no acute complaints.      Past Medical History:  Past Medical History:   Diagnosis Date    Acute metabolic encephalopathy 2022    Anxiety     Arthritis     Benign essential hypertension 2014    Bleeding disorder     Depression     Diabetes     Diabetes mellitus, type 2     Disc degeneration, lumbar     Headache, tension-type     Hyperlipidemia     Hypothyroidism     Neuropathy     Osteoporosis 2015    Pancreatic mass     2023, on Monthly Octreotide Injection    Peripheral neuropathy     Rotator cuff tear, left     Scoliosis     Shoulder pain     LEFT, TORN ROTATOR  CUFF S/P FALL    Spinal stenosis      Past Surgical History:  Past Surgical History:   Procedure Laterality Date    APPENDECTOMY      BILATERAL BREAST REDUCTION Bilateral 08/2015    CATARACT EXTRACTION  03/2015    CHOLECYSTECTOMY WITH INTRAOPERATIVE CHOLANGIOGRAM N/A 3/27/2022    Procedure: CHOLECYSTECTOMY LAPAROSCOPIC INTRAOPERATIVE CHOLANGIOGRAM;  Surgeon: Aiyana Hill MD;  Location: [facility] OR;  Service: General;  Laterality: N/A;    COLONOSCOPY  06/05/2015    WNL    ERCP N/A 2/25/2022    Procedure: ENDOSCOPIC RETROGRADE CHOLANGIOPANCREATOGRAPHY with sphincterotomy and balloon sweep;  Surgeon: Chilo Wilhelm MD;  Location: [facility] ENDOSCOPY;  Service: Gastroenterology;  Laterality: N/A;  PRE/POST - CBD stones    ERCP N/A 3/28/2022    Procedure: ENDOSCOPIC RETROGRADE CHOLANGIOPANCREATOGRAPHY WITH SPHINCTEROTOMY AND BALLOON SWEEP;  Surgeon: Chilo Wilhelm MD;  Location: [facility] ENDOSCOPY;  Service: Gastroenterology;  Laterality: N/A;  PRE: COMMON DUCT STONE  POST: COMMON DUCT STONE    KYPHOPLASTY      REDUCTION MAMMAPLASTY      TONSILLECTOMY        Home Meds:  (Not in a hospital admission)      Allergies:  Allergies   Allergen Reactions    Sulfa Antibiotics Unknown - High Severity     Pt says it was a long time ago and I don't remember but it wasn't good.      Immunizations:  Immunization History   Administered Date(s) Administered    COVID-19 (PFIZER) Purple Cap Monovalent 02/27/2021, 03/20/2021    FLUAD TRI 65YR+ 10/16/2019    FluMist 2-49yrs 10/03/2012    Fluad Quad 65+ 11/30/2020    Fluzone (or Fluarix & Flulaval for VFC) >6mos 12/07/2021    Fluzone High-Dose 65+YRS 10/09/2017, 10/09/2017, 11/04/2024    PPD Test 06/21/2024    Pneumococcal Conjugate 13-Valent (PCV13) 04/29/2017, 04/29/2017    Pneumococcal, Unspecified 10/01/2009    Shingrix 04/15/2019    Td (TDVAX) 06/30/2016    Tdap 04/29/2017, 04/29/2017    Zostavax 12/14/2010     Social History:   Social History     Social History Narrative     Not on file     Social History     Tobacco Use    Smoking status: Former     Current packs/day: 0.00     Average packs/day: 1 pack/day for 40.0 years (40.0 ttl pk-yrs)     Types: Cigarettes     Start date:      Quit date:      Years since quittin.1    Smokeless tobacco: Never   Substance Use Topics    Alcohol use: No     Family History:  Family History   Problem Relation Age of Onset    Bone cancer Mother     No Known Problems Father     Heart disease Daughter         Review of Systems  Review of Systems   Constitutional:         As per history of present illness   HENT: Negative.     Eyes: Negative.    Respiratory: Negative.     Cardiovascular: Negative.    Gastrointestinal:         As per history of present illness   Endocrine: Negative.    Genitourinary: Negative.    Musculoskeletal: Negative.    Skin: Negative.    Allergic/Immunologic: Negative.    Neurological: Negative.    Hematological: Negative.    Psychiatric/Behavioral: Negative.         Objective:  T Max 24 hrs: Temp (24hrs), Av.7 °F (37.1 °C), Min:98.7 °F (37.1 °C), Max:98.7 °F (37.1 °C)    Vitals Ranges:   Temp:  [98.7 °F (37.1 °C)] 98.7 °F (37.1 °C)  Heart Rate:  [69-81] 81  Resp:  [20] 20  BP: (160-206)/(59-76) 163/62      Exam:  Physical Exam  Constitutional:       General: She is not in acute distress.     Appearance: Normal appearance. She is obese. She is not ill-appearing, toxic-appearing or diaphoretic.   HENT:      Head: Normocephalic and atraumatic.      Right Ear: External ear normal.      Left Ear: External ear normal.      Nose: Nose normal.      Mouth/Throat:      Mouth: Mucous membranes are moist.   Eyes:      General: No scleral icterus.        Right eye: No discharge.         Left eye: No discharge.      Extraocular Movements: Extraocular movements intact.      Conjunctiva/sclera: Conjunctivae normal.   Cardiovascular:      Rate and Rhythm: Normal rate and regular rhythm.      Heart sounds:      No friction rub. No  gallop.   Pulmonary:      Effort: Pulmonary effort is normal.      Breath sounds: Normal breath sounds.   Abdominal:      General: Abdomen is flat. Bowel sounds are normal.      Palpations: Abdomen is soft.   Musculoskeletal:      Cervical back: Neck supple.      Right lower leg: Edema present.      Left lower leg: Edema present.      Comments: Chronic lower extremity edema with evidence of chronic stasis dermatitis shins.  Right shin wrapped.   Skin:     General: Skin is warm and dry.   Neurological:      General: No focal deficit present.      Mental Status: She is alert and oriented to person, place, and time.   Psychiatric:         Mood and Affect: Mood normal.         Behavior: Behavior normal.         Thought Content: Thought content normal.         Judgment: Judgment normal.         Data Review:  All labs and radiology reviewed.    Assessment:  Hypertensive urgency: Cardene drip weaned off.  Resume home medications with as needed hydralazine.  Diabetes type 2: History of poor control.  Insulin resistant.  Continue long-acting insulin and monitor closely with sliding scale insulin.  CKD 4: Monitor.  Avoid nephrotoxins.  Hypothyroid: Continue levothyroxine.  Recheck TSH.  Immobility: Progressive.  She will likely need a higher level of care on discharge.  Morbid obesity:      Plan:  Please see above.  Discussed with patient.  Discussed with ER provider  Discussed with RN.    Raulito Disla MD  2/17/2025  22:16 EST    EMR Dragon/Transcription disclaimer:   Much of this encounter note is an electronic transcription/translation of spoken language to printed text. The electronic translation of spoken language may permit erroneous, or at times, nonsensical words or phrases to be inadvertently transcribed; Although I have reviewed the note for such errors, some may still exist.

## 2025-02-18 NOTE — THERAPY EVALUATION
Patient Name: Halie Guidry  : 1940    MRN: [id number]                              Today's Date: 2025       Admit Date: 2025    Visit Dx:     ICD-10-CM ICD-9-CM   1. Accelerated hypertension  I10 401.0   2. General weakness  R53.1 780.79   3. Inability to walk  R26.2 719.7   4. Leukocytosis, unspecified type  D72.829 288.60   5. Anemia, unspecified type  D64.9 285.9   6. Chronic kidney disease, unspecified CKD stage  N18.9 585.9   7. Hyperglycemia  R73.9 790.29     Patient Active Problem List   Diagnosis    Compression fracture    Lumbar degenerative disc disease    Type 2 diabetes mellitus with hyperglycemia, with long-term current use of insulin    Hyperlipidemia    Benign essential hypertension    Primary hypothyroidism    Neuropathy    Osteoporosis    Proteinuria    Tobacco abuse    Generalized weakness    Spinal stenosis    Scoliosis    Arthritis    VBI (vertebrobasilar insufficiency)    Orthostatic hypotension    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    Severe hypothyroidism    Noncompliance with medication regimen    Vitamin D deficiency disease    Tremor    Seizure    Thoracic degenerative disc disease    Acute kidney injury (VIPUL) with acute tubular necrosis (ATN)    Hyperglycemia    Type 2 diabetes mellitus with hyperglycemia    Lower abdominal pain    Transaminitis    Emphysematous cystitis    Choledocholithiasis    Hypokalemia    Hypoxia    Generalized abdominal pain    History of Clostridium difficile infection    History of ERCP    Hypomagnesemia    Anxiety disorder    Neuropathic pain    Intertrigo    Weakness of both lower extremities    Accelerated hypertension    Acute cystitis without hematuria    Left lower lobe pneumonia    Cellulitis and abscess of left lower extremity    Benign hypertension with CKD (chronic kidney disease) stage IV    Peripheral edema    Primary malignant neuroendocrine tumor of pancreas    Encounter for long-term (current) use of  high-risk medication    Diabetic foot ulcer    CKD (chronic kidney disease) stage 4, GFR 15-29 ml/min    Pressure injury of buttock, stage 2    HTN (hypertension)    Anemia due to chronic kidney disease    Bilateral lower extremity edema    Cellulitis of right lower extremity    CKD (chronic kidney disease) stage 4, GFR 15-29 ml/min    Acute CHF    Bilateral cellulitis of lower leg    Acute UTI    UTI (urinary tract infection)    Acute UTI (urinary tract infection)    Metabolic acidosis    Cobalamin deficiency    Dependent edema    Gastroesophageal reflux disease    Mild neurocognitive disorder    Pancreatic neoplasm    Congestive heart failure    CHF exacerbation    Acute exacerbation of CHF (congestive heart failure)    Hypertensive urgency     Past Medical History:   Diagnosis Date    Acute metabolic encephalopathy 06/24/2022    Anxiety     Arthritis     Benign essential hypertension 08/20/2014    Bleeding disorder     Depression     Diabetes     Diabetes mellitus, type 2     Disc degeneration, lumbar     Headache, tension-type     Hyperlipidemia     Hypothyroidism     Neuropathy     Osteoporosis 09/09/2015    Pancreatic mass     Sept 2023, on Monthly Octreotide Injection    Peripheral neuropathy     Rotator cuff tear, left     Scoliosis     Shoulder pain     LEFT, TORN ROTATOR CUFF S/P FALL    Spinal stenosis      Past Surgical History:   Procedure Laterality Date    APPENDECTOMY      BILATERAL BREAST REDUCTION Bilateral 08/2015    CATARACT EXTRACTION  03/2015    CHOLECYSTECTOMY WITH INTRAOPERATIVE CHOLANGIOGRAM N/A 3/27/2022    Procedure: CHOLECYSTECTOMY LAPAROSCOPIC INTRAOPERATIVE CHOLANGIOGRAM;  Surgeon: Aiyana Hill MD;  Location: [facility] MAIN OR;  Service: General;  Laterality: N/A;    COLONOSCOPY  06/05/2015    WNL    ERCP N/A 2/25/2022    Procedure: ENDOSCOPIC RETROGRADE CHOLANGIOPANCREATOGRAPHY with sphincterotomy and balloon sweep;  Surgeon: Chilo Wilhelm MD;  Location: [facility] ENDOSCOPY;   Service: Gastroenterology;  Laterality: N/A;  PRE/POST - CBD stones    ERCP N/A 3/28/2022    Procedure: ENDOSCOPIC RETROGRADE CHOLANGIOPANCREATOGRAPHY WITH SPHINCTEROTOMY AND BALLOON SWEEP;  Surgeon: Chilo Wilhelm MD;  Location: [facility] ENDOSCOPY;  Service: Gastroenterology;  Laterality: N/A;  PRE: COMMON DUCT STONE  POST: COMMON DUCT STONE    KYPHOPLASTY      REDUCTION MAMMAPLASTY      TONSILLECTOMY        General Information       Row Name 02/18/25 0957          OT Time and Intention    Document Type evaluation  -PP     Mode of Treatment individual therapy;occupational therapy  -PP       Row Name 02/18/25 0957          General Information    Patient Profile Reviewed yes  -PP     Prior Level of Function independent:;min assist:;ADL's;w/c or scooter  Pt  was (I) for ADLs, recently requiring assist and episodes of incontinence. Pt uses rwx for short distance and power w/c for longer distances to dinning trent.  -PP     Existing Precautions/Restrictions fall  -PP     Barriers to Rehab previous functional deficit  -PP       Row Name 02/18/25 0957          Living Environment    People in Home facility resident  [facility]  -PP       Row Name 02/18/25 0957          Cognition    Orientation Status (Cognition) oriented x 3  -PP       Row Name 02/18/25 0957          Safety Issues/Impairments Affecting Functional Mobility    Impairments Affecting Function (Mobility) balance;endurance/activity tolerance;strength  -PP     Comment, Safety Issues/Impairments (Mobility) gait belt and non skid socks worn for safety  -PP               User Key  (r) = Recorded By, (t) = Taken By, (c) = Cosigned By      Initials Name Provider Type    PP Jodee East, OT Occupational Therapist                     Mobility/ADL's       Row Name 02/18/25 1000          Bed Mobility    Bed Mobility supine-sit;sit-supine;scooting/bridging  -PP     Scooting/Bridging [place] (Bed Mobility) dependent (less than 25%  "patient effort);2 person assist  -PP     Supine-Sit Nathalie (Bed Mobility) standby assist  -PP     Sit-Supine Nathalie (Bed Mobility) minimum assist (75% patient effort)  -PP     Bed Mobility, Safety Issues decreased use of legs for bridging/pushing  -PP     Assistive Device (Bed Mobility) bed rails  -PP     Comment, (Bed Mobility) Min A to lift LE's into bed  -PP       Row Name 02/18/25 1000          Transfers    Transfers stand-sit transfer;sit-stand transfer  -PP       Row Name 02/18/25 1000          Sit-Stand Transfer    Sit-Stand Nathalie (Transfers) moderate assist (50% patient effort)  -PP     Assistive Device (Sit-Stand Transfers) walker, front-wheeled  -PP       Row Name 02/18/25 1000          Stand-Sit Transfer    Stand-Sit Nathalie (Transfers) contact guard  -PP     Assistive Device (Stand-Sit Transfers) walker, front-wheeled  -PP       Row Name 02/18/25 1000          Functional Mobility    Functional Mobility- Comment Pt unable to move feet today. pt reports, \"my legs feel really heavy from edema\"  -PP       Row Name 02/18/25 1000          Activities of Daily Living    BADL Assessment/Intervention lower body dressing;grooming;feeding;toileting  -PP       Row Name 02/18/25 1000          Self-Feeding Assessment/Training    Nathalie Level (Feeding) feeding skills;set up  -PP       Row Name 02/18/25 1000          Lower Body Dressing Assessment/Training    [place]athalie Level (Lower Body Dressing) doff;don;socks;dependent (less than 25% patient effort)  -PP     Position (Lower Body Dressing) sitting up in bed  -PP       Row Name 02/18/25 1000          Grooming Assessment/Training    [place]athalie Level (Grooming) wash face, hands;set up  -PP     Position (Grooming) edge of bed sitting  -PP       Row Name 02/18/25 1000          Toileting Assessment/Training    Nathalie Level (Toileting) dependent (less than 25% patient effort)  -PP     Comment, (Toileting) Ant and brief present, " recently requring assist for clean up following episodes of incontinence  -PP               User Key  (r) = Recorded By, (t) = Taken By, (c) = Cosigned By      Initials Name Provider Type    PP Jodee East OT Occupational Therapist                   Obj/Interventions       Row Name 02/18/25 1004          Sensory Assessment (Somatosensory)    Sensory Assessment (Somatosensory) UE sensation intact  -PP     Sensory Assessment per pt report  -PP       [facility] Name 02/18/25 1004          Vision Assessment/Intervention    Visual Impairment/Limitations WFL  -PP       [facility] Name 02/18/25 1004          Range of Motion Comprehensive    General Range of Motion bilateral upper extremity ROM WFL  -PP       [facility] Name 02/18/25 1004          Strength Comprehensive (MMT)    General Manual Muscle Testing (MMT) Assessment no strength deficits identified  -PP     Comment, General Manual Muscle Testing (MMT) Assessment BUE gen weakness, grossly ~3+/5  -PP       [facility] Name 02/18/25 1004          Motor Skills    Motor Skills functional endurance  -PP     Functional Endurance fair  -PP       Row Name 02/18/25 1004          Balance    Balance Assessment sitting static balance;standing static balance;sitting dynamic balance  -PP     Static Sitting Balance supervision  -PP     Dynamic Sitting Balance contact guard  -PP     Position, Sitting Balance sitting edge of bed  -PP     Static Standing Balance moderate assist;maximum assist  -PP     Position/Device Used, Standing Balance supported  -PP               User Key  (r) = Recorded By, (t) = Taken By, (c) = Cosigned By      Initials Name Provider Type    PP Jodee East OT Occupational Therapist                   Goals/Plan       Row Name 02/18/25 1013          Bed Mobility Goal 1 (OT)    Activity/Assistive Device (Bed Mobility Goal 1, OT) bed mobility activities, all  -PP     [place] Level/Cues Needed (Bed Mobility Goal 1, OT) modified independence  -PP     Time Frame (Bed Mobility  Goal 1, OT) short term goal (STG);2 weeks  -PP       Row Name 02/18/25 1013          Transfer Goal 1 (OT)    Activity/Assistive Device (Transfer Goal 1, OT) transfers, all  -PP     [place] Level/Cues Needed (Transfer Goal 1, OT) modified independence  -PP     Time Frame (Transfer Goal 1, OT) short term goal (STG);2 weeks  -PP     Progress/Outcome (Transfer Goal 1, OT) new goal  -PP       Row Name 02/18/25 1013          Dressing Goal 1 (OT)    Activity/Device (Dressing Goal 1, OT) dressing skills, all;upper body dressing;lower body dressing  -PP     [place]/Cues Needed (Dressing Goal 1, OT) modified independence  -PP     Time Frame (Dressing Goal 1, OT) short term goal (STG);2 weeks  -PP     Progress/Outcome (Dressing Goal 1, OT) new goal  -PP       Row Name 02/18/25 1013          Toileting Goal 1 (OT)    Activity/Device (Toileting Goal 1, OT) toileting skills, all  -PP     [place] Level/Cues Needed (Toileting Goal 1, OT) modified independence  -PP     Time Frame (Toileting Goal 1, OT) short term goal (STG);2 weeks  -PP     Progress/Outcome (Toileting Goal 1, OT) new goal  -PP       Row Name 02/18/25 1013          Grooming Goal 1 (OT)    Activity/Device (Grooming Goal 1, OT) grooming skills, all  -PP     [place] (Grooming Goal 1, OT) modified independence  -PP     Time Frame (Grooming Goal 1, OT) short term goal (STG);2 weeks  -PP     Strategies/Barriers (Grooming Goal 1, OT) standing for > 3 mins  -PP     Progress/Outcome (Grooming Goal 1, OT) new goal  -PP       Row Name 02/18/25 1013          Therapy Assessment/Plan (OT)    Planned Therapy Interventions (OT) activity tolerance training;BADL retraining;functional balance retraining;occupation/activity based interventions;patient/caregiver education/training;strengthening exercise;transfer/mobility retraining  -PP               User Key  (r) = Recorded By, (t) = Taken By, (c) = Cosigned By      Initials Name Provider Type    PP [place],  TOMÁS Ellsworth Occupational Therapist                   Clinical Impression       Row Name 02/18/25 1005          Pain Assessment    Pretreatment Pain Rating 0/10 - no pain  -PP     Posttreatment Pain Rating 0/10 - no pain  -PP       Row Name 02/18/25 1005          Plan of Care Review    Plan of Care Reviewed With patient  -PP     Progress improving  -PP     Outcome Evaluation Pt is a 83 y/o female admitted to [facility] 2/2 weakness and some diarrhea.  At [facility], pt was (I) for ADLs, however, recently pt has been steadily declining in overall function, requiring more assist than usual in addition to episodes of incontinence. Pt reports using rwx for short distance and power w/c for longer distances to dinning trent. Today, pt presents with deficits in act tolerance/ endurance, balance, and strength. Pt reqs Sba for supine to sit  EOB but Min A to return to supine. S/u for seated ADLs mostly but Dep for LB ADLs. Pt also Mod A for STS from EOB supported but unable to take steps. Pt would benefit from skilled OT to maximize patient safety and promote (I) w/ ADLs and xfers. Rec SNF at d/c. before possibly returning to [facility] if [facility]. OT will continue to monitor.  -PP       Row Name 02/18/25 1005          Therapy Assessment/Plan (OT)    Rehab Potential (OT) good  -PP     Criteria for Skilled Therapeutic Interventions Met (OT) yes;skilled treatment is necessary  -PP     Therapy Frequency (OT) 5 times/wk  -PP       Row Name 02/18/25 1005          Therapy Plan Review/Discharge Plan (OT)    Anticipated Discharge Disposition (OT) skilled nursing facility  -PP       Row Name 02/18/25 1005          Vital Signs    O2 Delivery Pre Treatment room air  -PP     O2 Delivery Intra Treatment room air  -PP     O2 Delivery Post Treatment room air  -PP     Pre Patient Position Supine  -PP     Intra Patient Position Standing  -PP     Post Patient Position Supine  -PP       Row Name 02/18/25 1005          Positioning and Restraints    Pre-Treatment  Position in bed  -PP     Post Treatment Position bed  -PP     In Bed notified nsg;call light within reach;encouraged to call for assist;exit alarm on;sitting  -PP               User Key  (r) = Recorded By, (t) = Taken By, (c) = Cosigned By      Initials Name Provider Type    Jodee White OT Occupational Therapist                   Outcome Measures       Row Name 02/18/25 1014          How much help from another is currently needed...    Putting on and taking off regular lower body clothing? 1  -PP     Bathing (including washing, rinsing, and drying) 2  -PP     Toileting (which includes using toilet bed pan or urinal) 1  -PP     Putting on and taking off regular upper body clothing 2  -PP     Taking care of personal grooming (such as brushing teeth) 3  -PP     Eating meals 3  -PP     AM-PAC 6 Clicks Score (OT) 12  -PP       Row Name 02/18/25 1014          Functional Assessment    Outcome Measure Options AM-PAC 6 Clicks Daily Activity (OT)  -PP               User Key  (r) = Recorded By, (t) = Taken By, (c) = Cosigned By      Initials Name Provider Type    Jodee White OT Occupational Therapist                    Occupational Therapy Education       Title: PT OT SLP Therapies (In Progress)       Topic: Occupational Therapy (In Progress)       Point: ADL training (Done)       Description:   Instruct learner(s) on proper safety adaptation and remediation techniques during self care or transfers.   Instruct in proper use of assistive devices.                  Learning Progress Summary            Patient Acceptance, E, VU by PP at 2/18/2025 1014    Comment: Pt Ed on OT role and dc planning                      Point: Home exercise program (Not Started)       Description:   Instruct learner(s) on appropriate technique for monitoring, assisting and/or progressing therapeutic exercises/activities.                  Learner Progress:  Not documented in this visit.              Point: Precautions (Not  Started)       Description:   Instruct learner(s) on prescribed precautions during self-care and functional transfers.                  Learner Progress:  Not documented in this visit.              Point: Body mechanics (Not Started)       Description:   Instruct learner(s) on proper positioning and spine alignment during self-care, functional mobility activities and/or exercises.                  Learner Progress:  Not documented in this visit.                              User Key       Initials Effective Dates Name Provider Type Discipline     06/09/23 -  Jodee East OT Occupational Therapist OT                  OT Recommendation and Plan  Planned Therapy Interventions (OT): activity tolerance training, BADL retraining, functional balance retraining, occupation/activity based interventions, patient/caregiver education/training, strengthening exercise, transfer/mobility retraining  Therapy Frequency (OT): 5 times/wk  Plan of Care Review  Plan of Care Reviewed With: patient  Progress: improving  Outcome Evaluation: [facility] 85 [facility] and some diarrhea.  At [facility], pt was (I) for ADLs, however, recently pt has been steadily declining in overall function, requiring more assist than usual in addition to episodes of incontinence. Pt reports using rwx for short distance and power w/c for longer distances to dinning trent. Today, pt presents with deficits in act tolerance/ endurance, balance, and strength. Pt reqs Sba for supine to sit  EOB but Min A to return to supine. S/u for seated ADLs mostly but Dep for LB ADLs. Pt also Mod A for STS from EOB supported but unable to take steps. Pt would benefit from skilled OT to maximize patient safety and promote (I) w/ ADLs and xfers. Rec SNF at d/c. before possibly returning to [facility] if able. OT will continue to monitor.     Time Calculation:   Evaluation Complexity (OT)  Review Occupational Profile/Medical/Therapy History Complexity:  expanded/moderate complexity  Assessment, Occupational Performance/Identification of Deficit Complexity: 3-5 performance deficits  Clinical Decision Making Complexity (OT): detailed assessment/moderate complexity  Overall Complexity of Evaluation (OT): moderate complexity     Time Calculation- OT       Row Name 02/18/25 0956 02/18/25 0937          Time Calculation- OT    OT Start Time 0840  -PP --     OT Stop Time 0915  -PP --     OT Time Calculation (min) 35 min  -PP --     Total Timed Code Minutes- OT 25 minute(s)  -PP --     OT Received On 02/18/25  -PP --     OT - Next Appointment 02/19/25  -PP --  -PP     OT Goal Re-Cert Due Date 03/04/25  -PP --        Timed Charges    52727 - OT Therapeutic Activity Minutes 15  -PP --     02789 - OT Self Care/Mgmt Minutes 10  -PP --        Untimed Charges    OT Eval/Re-eval Minutes 10  -PP --        Total Minutes    Timed Charges Total Minutes 25  -PP --     Untimed Charges Total Minutes 10  -PP --      Total Minutes 35  -PP --               User Key  (r) = Recorded By, (t) = Taken By, (c) = Cosigned By      Initials Name Provider Type    PP Kita, Porrjia, OT Occupational Therapist                  Therapy Charges for Today       Code Description Service Date Service Provider Modifiers Qty    09102428417 HC OT THERAPEUTIC ACT EA 15 MIN 2/18/2025 Llano, Porshia, OT GO 1    58776312154 HC OT SELF CARE/MGMT/TRAIN EA 15 MIN 2/18/2025 Llano, Porshia, OT GO 1    77729664364 HC OT EVAL MOD COMPLEXITY 3 2/18/2025 Llano, Porshia, OT GO 1                 Porshia Llano, OT  2/18/2025

## 2025-02-18 NOTE — ED NOTES
Patient to er per ems from independent living with c/o increasing in weakness and some diarrhea.  Patient alert x 4 to base line. Ems did reported patient just completed meds for UTI.   
Pt given boxed lunch and diet soda, ok per MD. Pt updated on status. Awaiting for room assignment.  
DM2 (diabetes mellitus, type 2)
DM2 (diabetes mellitus, type 2)

## 2025-02-18 NOTE — PROGRESS NOTES
" LOS: 0 days     Name: Halie Guidry  Age: 84 y.o.  Sex: female  :  1940  MRN: [id number]         Primary Care Physician: Napoleon Mead MD    Subjective   Subjective  No new complaints or acute overnight events.  She says difficult to know how weak she is feeling as she has not yet been up out of bed this morning.  Denies any shortness of breath or chest pain.  Blood pressure currently 172/84 and she is off of the Cardene drip.    Objective   Vital Signs  Temp:  [98.7 °F (37.1 °C)] 98.7 °F (37.1 °C)  Heart Rate:  [63-81] 66  Resp:  [20] 20  BP: (156-206)/(47-91) 172/84  Body mass index is 33.91 kg/m².    Objective:  General Appearance:  Comfortable and in no acute distress (Weak and deconditioned appearing).    Vital signs: (most recent): Blood pressure 172/84, pulse 66, temperature 98.7 °F (37.1 °C), temperature source Oral, resp. rate 20, height 172.7 cm (68\"), weight 101 kg (223 lb), SpO2 94%, not currently breastfeeding.    Lungs:  Normal effort and normal respiratory rate.  She is not in respiratory distress.  There are decreased breath sounds.    Heart: Normal rate.  Regular rhythm.    Abdomen: Abdomen is soft.  Bowel sounds are normal.   There is no abdominal tenderness.     Extremities: There is no dependent edema or local swelling.    Neurological: Patient is alert and oriented to person, place and time.    Skin:  Warm and dry.  (She has a dressing over her right calf.  I reviewed pictures of this with most recent taken on Friday that has showed excellent healing over the past 4 weeks)              Results Review:       I reviewed the patient's new clinical results.    Results from last 7 days   Lab Units 25  1404   WBC 10*3/mm3 13.00*   HEMOGLOBIN g/dL 9.6*   PLATELETS 10*3/mm3 303     Results from last 7 days   Lab Units 25  0557 25  1404   SODIUM mmol/L 142 141   POTASSIUM mmol/L 3.9 4.1   CHLORIDE mmol/L 107 106   CO2 mmol/L 22.0 23.0   BUN mg/dL 45* 50*   CREATININE " mg/dL 2.06* 2.28*   CALCIUM mg/dL 9.0 9.1   GLUCOSE mg/dL 94 125*                 Scheduled Meds:   amLODIPine, 5 mg, Oral, Q24H  atorvastatin, 80 mg, Oral, Nightly  bisoprolol, 5 mg, Oral, Daily  DULoxetine, 30 mg, Oral, Daily  [START ON 2/19/2025] ferrous sulfate, 325 mg, Oral, Every Other Day  fluticasone, 2 spray, Nasal, Daily  heparin (porcine), 5,000 Units, Subcutaneous, Q12H  insulin glargine, 72 Units, Subcutaneous, Daily  insulin lispro, 4-24 Units, Subcutaneous, 4x Daily AC & at Bedtime  [START ON 2/19/2025] levothyroxine, 150 mcg, Oral, Q AM  pantoprazole, 40 mg, Oral, Q AM  pregabalin, 75 mg, Oral, BID  sodium chloride, 10 mL, Intravenous, Q12H  torsemide, 40 mg, Oral, Daily      PRN Meds:     acetaminophen **OR** acetaminophen **OR** acetaminophen    senna-docusate sodium **AND** polyethylene glycol **AND** bisacodyl **AND** bisacodyl    dextrose    dextrose    glucagon (human recombinant)    hydrALAZINE    ondansetron ODT **OR** ondansetron    sodium chloride    sodium chloride  Continuous Infusions:       Assessment & Plan   Active Hospital Problems    Diagnosis  POA    **Hypertensive urgency [I16.0]  Yes    HTN (hypertension) [I10]  Yes    CKD (chronic kidney disease) stage 4, GFR 15-29 ml/min [N18.4]  Yes    Type 2 diabetes mellitus with hyperglycemia, with long-term current use of insulin [E11.65, Z79.4]  Not Applicable    Primary hypothyroidism [E03.9]  Yes      Resolved Hospital Problems   No resolved problems to display.       Assessment & Plan    -Her home antihypertensive regimen was restarted yesterday.  Blood pressure still elevated this morning and I will add 5 mg of Norvasc.  Monitor off of Cardene drip  -Renal function appears stable at her baseline.  Continue torsemide  -Blood sugars have come under better control and will continue present insulin regimen  -Leukocytosis yesterday noted.  No evidence of infectious process.  Repeat tomorrow  -TSH elevated at just above 8.  Increase  Synthroid to 150 mcg.  -PT, OT, wound care to see        Expected discharge date/ time has not been documented.     Tang Beal MD  [facility]  02/18/25  08:24 EST

## 2025-02-18 NOTE — PLAN OF CARE
Goal Outcome Evaluation:  Plan of Care Reviewed With: patient        Progress: improving  Outcome Evaluation: Pt is a 85 y/o female admitted to [facility] 2/2 weakness and some diarrhea.  At [facility], pt was (I) for ADLs, however, recently pt has been steadily declining in overall function, requiring more assist than usual in addition to episodes of incontinence. Pt reports using rwx for short distance and power w/c for longer distances to dinning trent. Today, pt presents with deficits in act tolerance/ endurance, balance, and strength. Pt reqs Sba for supine to sit  EOB but Min A to return to supine. S/u for seated ADLs mostly but Dep for LB ADLs. Pt also Mod A for STS from EOB supported but unable to take steps. Pt would benefit from skilled OT to maximize patient safety and promote (I) w/ ADLs and xfers. Rec SNF at d/c. before possibly returning to care home if able. OT will continue to monitor.    Anticipated Discharge Disposition (OT): skilled nursing facility

## 2025-02-18 NOTE — NURSING NOTE
/78. Scheduled morning BP meds given. MD notified. Will recheck BP in 45 minutes. PRN hydralazine if needed.

## 2025-02-18 NOTE — SIGNIFICANT NOTE
02/18/25 0937   OTHER   Discipline occupational therapist   Rehab Time/Intention   Session Not Performed other (see comments)  (OT spoke with pt, requesting to sleep and pleasantly declining to get OOB. Pt has fear of falling, reporting being [facility] [place], and mostly (I) for ADLs using rwx but still haivng frequent falls. Pt is also current w/ OT/PT 2x/ week. OT will f/u as time allows. )   Therapy Assessment/Plan (PT)   Criteria for Skilled Interventions Met (PT) yes;skilled treatment is necessary   Recommendation   OT - Next Appointment 02/19/25

## 2025-02-19 LAB
ANION GAP SERPL CALCULATED.3IONS-SCNC: 15 MMOL/L (ref 5–15)
BUN SERPL-MCNC: 47 MG/DL (ref 8–23)
BUN/CREAT SERPL: 20.7 (ref 7–25)
C AURIS DNA SPEC QL NAA+NON-PROBE: NOT DETECTED
CALCIUM SPEC-SCNC: 8.7 MG/DL (ref 8.6–10.5)
CHLORIDE SERPL-SCNC: 107 MMOL/L (ref 98–107)
CO2 SERPL-SCNC: 21 MMOL/L (ref 22–29)
CREAT SERPL-MCNC: 2.27 MG/DL (ref 0.57–1)
DEPRECATED RDW RBC AUTO: 46.5 FL (ref 37–54)
EGFRCR SERPLBLD CKD-EPI 2021: 20.8 ML/MIN/1.73
ERYTHROCYTE [DISTWIDTH] IN BLOOD BY AUTOMATED COUNT: 15.5 % (ref 12.3–15.4)
GLUCOSE BLDC GLUCOMTR-MCNC: 118 MG/DL (ref 70–130)
GLUCOSE BLDC GLUCOMTR-MCNC: 127 MG/DL (ref 70–130)
GLUCOSE BLDC GLUCOMTR-MCNC: 169 MG/DL (ref 70–130)
GLUCOSE BLDC GLUCOMTR-MCNC: 186 MG/DL (ref 70–130)
GLUCOSE SERPL-MCNC: 121 MG/DL (ref 65–99)
HCT VFR BLD AUTO: 26.3 % (ref 34–46.6)
HGB BLD-MCNC: 8.5 G/DL (ref 12–15.9)
MCH RBC QN AUTO: 27.2 PG (ref 26.6–33)
MCHC RBC AUTO-ENTMCNC: 32.3 G/DL (ref 31.5–35.7)
MCV RBC AUTO: 84.3 FL (ref 79–97)
PLATELET # BLD AUTO: 293 10*3/MM3 (ref 140–450)
PMV BLD AUTO: 9.9 FL (ref 6–12)
POTASSIUM SERPL-SCNC: 3.8 MMOL/L (ref 3.5–5.2)
RBC # BLD AUTO: 3.12 10*6/MM3 (ref 3.77–5.28)
SODIUM SERPL-SCNC: 143 MMOL/L (ref 136–145)
WBC NRBC COR # BLD AUTO: 8.78 10*3/MM3 (ref 3.4–10.8)

## 2025-02-19 PROCEDURE — 82948 REAGENT STRIP/BLOOD GLUCOSE: CPT

## 2025-02-19 PROCEDURE — 25010000002 HEPARIN (PORCINE) PER 1000 UNITS: Performed by: HOSPITALIST

## 2025-02-19 PROCEDURE — 97110 THERAPEUTIC EXERCISES: CPT

## 2025-02-19 PROCEDURE — G0378 HOSPITAL OBSERVATION PER HR: HCPCS

## 2025-02-19 PROCEDURE — 80048 BASIC METABOLIC PNL TOTAL CA: CPT | Performed by: INTERNAL MEDICINE

## 2025-02-19 PROCEDURE — 85027 COMPLETE CBC AUTOMATED: CPT | Performed by: INTERNAL MEDICINE

## 2025-02-19 PROCEDURE — 97162 PT EVAL MOD COMPLEX 30 MIN: CPT

## 2025-02-19 PROCEDURE — 96372 THER/PROPH/DIAG INJ SC/IM: CPT

## 2025-02-19 PROCEDURE — 63710000001 INSULIN LISPRO (HUMAN) PER 5 UNITS: Performed by: HOSPITALIST

## 2025-02-19 PROCEDURE — 63710000001 INSULIN GLARGINE PER 5 UNITS: Performed by: NURSE PRACTITIONER

## 2025-02-19 RX ADMIN — Medication 10 ML: at 21:32

## 2025-02-19 RX ADMIN — PANTOPRAZOLE SODIUM 40 MG: 40 TABLET, DELAYED RELEASE ORAL at 05:22

## 2025-02-19 RX ADMIN — ACETAMINOPHEN 650 MG: 325 TABLET, FILM COATED ORAL at 13:15

## 2025-02-19 RX ADMIN — PREGABALIN 75 MG: 50 CAPSULE ORAL at 08:25

## 2025-02-19 RX ADMIN — DULOXETINE HYDROCHLORIDE 30 MG: 30 CAPSULE, DELAYED RELEASE ORAL at 08:25

## 2025-02-19 RX ADMIN — HYDRALAZINE HYDROCHLORIDE 50 MG: 50 TABLET ORAL at 21:31

## 2025-02-19 RX ADMIN — ACETAMINOPHEN 650 MG: 325 TABLET, FILM COATED ORAL at 05:42

## 2025-02-19 RX ADMIN — Medication 10 ML: at 08:32

## 2025-02-19 RX ADMIN — HEPARIN SODIUM 5000 UNITS: 5000 INJECTION INTRAVENOUS; SUBCUTANEOUS at 21:31

## 2025-02-19 RX ADMIN — LEVOTHYROXINE SODIUM 150 MCG: 0.07 TABLET ORAL at 05:22

## 2025-02-19 RX ADMIN — HYDRALAZINE HYDROCHLORIDE 50 MG: 50 TABLET ORAL at 05:22

## 2025-02-19 RX ADMIN — INSULIN LISPRO 4 UNITS: 100 INJECTION, SOLUTION INTRAVENOUS; SUBCUTANEOUS at 12:02

## 2025-02-19 RX ADMIN — AMLODIPINE BESYLATE 5 MG: 5 TABLET ORAL at 08:27

## 2025-02-19 RX ADMIN — HYDRALAZINE HYDROCHLORIDE 50 MG: 50 TABLET ORAL at 14:25

## 2025-02-19 RX ADMIN — FLUTICASONE PROPIONATE 2 SPRAY: 50 SPRAY, METERED NASAL at 09:22

## 2025-02-19 RX ADMIN — FERROUS SULFATE TAB 325 MG (65 MG ELEMENTAL FE) 325 MG: 325 (65 FE) TAB at 08:25

## 2025-02-19 RX ADMIN — PREGABALIN 75 MG: 50 CAPSULE ORAL at 21:31

## 2025-02-19 RX ADMIN — TORSEMIDE 40 MG: 20 TABLET ORAL at 08:31

## 2025-02-19 RX ADMIN — ACETAMINOPHEN 650 MG: 325 TABLET, FILM COATED ORAL at 21:31

## 2025-02-19 RX ADMIN — INSULIN GLARGINE 72 UNITS: 100 INJECTION, SOLUTION SUBCUTANEOUS at 08:25

## 2025-02-19 RX ADMIN — ATORVASTATIN CALCIUM 80 MG: 80 TABLET, FILM COATED ORAL at 21:30

## 2025-02-19 RX ADMIN — INSULIN LISPRO 4 UNITS: 100 INJECTION, SOLUTION INTRAVENOUS; SUBCUTANEOUS at 21:31

## 2025-02-19 RX ADMIN — HEPARIN SODIUM 5000 UNITS: 5000 INJECTION INTRAVENOUS; SUBCUTANEOUS at 08:24

## 2025-02-19 NOTE — DISCHARGE PLACEMENT REQUEST
"Tabatha Guidry (84 y.o. Female)       Date of Birth   1940    Social Security Number       Address   [address]  [place] [address]    Home Phone   [phone]    MRN   [id number]       Moravian   [employer]odaism    Marital Status                               Admission Date   2/17/25    Admission Type   Emergency    Admitting Provider   Milind Cowan MD    Attending Provider   Tang Beal MD    Department, Room/Bed   [address], [address]/1       Discharge Date       Discharge Disposition       Discharge Destination                                 Attending Provider: Tang Beal MD    Allergies: Sulfa Antibiotics    Isolation: Contact, Spore   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), 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), MDRO (01/20/25), Candida Auris (rule out) (02/17/25)   Code Status: CPR    Ht: 172.7 cm (68\")   Wt: 97.4 kg (214 lb 11.7 oz)    Admission Cmt: None   Principal Problem: Hypertensive urgency [I16.0]                   Active Insurance as of 2/17/2025       Primary Coverage       Payor Plan Insurance Group Employer/Plan Group    [facility] MEDICARE REPLACEMENT [facility] MED ADV GROUP [id number]       Payor Plan Address Payor Plan Phone Number Payor Plan Fax Number Effective Dates    [address] [address]   1/1/2023 - None Entered    [facility] [id number]         Subscriber Name Subscriber Birth Date Member ID       TABATHA GUIDRY 1940 [id number]                     Emergency Contacts        (Rel.) Home Phone Work Phone Mobile Phone    BreaDerrick (HCS) (Son) [phone] -- [phone]    BreaJosse (HCS) (Son) -- -- [phone]    JAYME GUIDRY (Grandchild) -- -- [phone]                "

## 2025-02-19 NOTE — PROGRESS NOTES
Name: Halie Guidry ADMIT: 2025   : 1940  PCP: Napoleon Mead MD    MRN: [id number] LOS: 0 days   AGE/SEX: 84 y.o. female  ROOM: [facility]     Subjective   Subjective   Ms Guidry is resting in bed, she offers no complaints at this time other than she is upset she needs to go to rehab to work with physical therapy on mobility, and strengthening before returning back to her assisted living facility.        Objective   Objective   Vital Signs  Temp:  [97.7 °F (36.5 °C)-98.2 °F (36.8 °C)] 98.2 °F (36.8 °C)  Heart Rate:  [64-81] 66  Resp:  [16-20] 16  BP: (142-185)/(44-96) 142/63  SpO2:  [92 %-97 %] 92 %  on   ;   Device (Oxygen Therapy): room air  Body mass index is 32.65 kg/m².  Physical Exam  Vitals and nursing note reviewed.   Constitutional:       Appearance: She is obese.   HENT:      Head: Atraumatic.      Mouth/Throat:      Mouth: Mucous membranes are dry.   Eyes:      Conjunctiva/sclera: Conjunctivae normal.   Cardiovascular:      Rate and Rhythm: Normal rate and regular rhythm.      Pulses: Normal pulses.      Heart sounds: Normal heart sounds.   Pulmonary:      Effort: Pulmonary effort is normal.      Breath sounds: Normal breath sounds.   Abdominal:      General: Bowel sounds are normal.      Palpations: Abdomen is soft.   Skin:     General: Skin is warm.      Capillary Refill: Capillary refill takes less than 2 seconds.      Findings: Erythema and wound present.      Comments: Right lateral/ posterior lower leg-dry Mepilex in place   Neurological:      General: No focal deficit present.      Mental Status: She is alert. Mental status is at baseline.       Results Review     I reviewed the patient's new clinical results.  Results from last 7 days   Lab Units 25  0538 25  1404   WBC 10*3/mm3 8.78 13.00*   HEMOGLOBIN g/dL 8.5* 9.6*   PLATELETS 10*3/mm3 293 303     Results from last 7 days   Lab Units 25  0538 25  0557 25  1404   SODIUM mmol/L 143 142 141    POTASSIUM mmol/L 3.8 3.9 4.1   CHLORIDE mmol/L 107 107 106   CO2 mmol/L 21.0* 22.0 23.0   BUN mg/dL 47* 45* 50*   CREATININE mg/dL 2.27* 2.06* 2.28*   GLUCOSE mg/dL 121* 94 125*   EGFR mL/min/1.73 20.8* 23.4* 20.7*     Results from last 7 days   Lab Units 02/17/25  1404   ALBUMIN g/dL 3.7   BILIRUBIN mg/dL 0.3   ALK PHOS U/L 165*   AST (SGOT) U/L 11   ALT (SGPT) U/L 7     Results from last 7 days   Lab Units 02/19/25  0538 02/18/25  0557 02/17/25  1404   CALCIUM mg/dL 8.7 9.0 9.1   ALBUMIN g/dL  --   --  3.7   MAGNESIUM mg/dL  --   --  2.1     Results from last 7 days   Lab Units 02/17/25  1639 02/17/25  1404   PROCALCITONIN ng/mL  --  0.09   LACTATE mmol/L 1.3  --      Glucose   Date/Time Value Ref Range Status   02/19/2025 1119 186 (H) 70 - 130 mg/dL Final   02/19/2025 0641 127 70 - 130 mg/dL Final   02/18/2025 2112 195 (H) 70 - 130 mg/dL Final   02/18/2025 1641 147 (H) 70 - 130 mg/dL Final   02/18/2025 1123 180 (H) 70 - 130 mg/dL Final   02/17/2025 2305 230 (H) 70 - 130 mg/dL Final       No radiology results for the last day    I have personally reviewed all medications:  Scheduled Medications  amLODIPine, 5 mg, Oral, Q24H  atorvastatin, 80 mg, Oral, Nightly  bisoprolol, 5 mg, Oral, Daily  DULoxetine, 30 mg, Oral, Daily  ferrous sulfate, 325 mg, Oral, Every Other Day  fluticasone, 2 spray, Nasal, Daily  heparin (porcine), 5,000 Units, Subcutaneous, Q12H  hydrALAZINE, 50 mg, Oral, Q8H  insulin glargine, 72 Units, Subcutaneous, Daily  insulin lispro, 4-24 Units, Subcutaneous, 4x Daily AC & at Bedtime  levothyroxine, 150 mcg, Oral, Q AM  pantoprazole, 40 mg, Oral, Q AM  pregabalin, 75 mg, Oral, BID  sodium chloride, 10 mL, Intravenous, Q12H  torsemide, 40 mg, Oral, Daily    Infusions   Diet  Diet: Regular/House, Cardiac, Diabetic; Healthy Heart (2-3 Na+); Consistent Carbohydrate; Fluid Consistency: Thin (IDDSI 0)    I have personally reviewed:  [x]  Laboratory   [x]  Microbiology   [x]  Radiology   [x]   EKG/Telemetry  [x]  Cardiology/Vascular   []  Pathology    []  Records       Assessment/Plan     Active Hospital Problems    Diagnosis  POA   • **Hypertensive urgency [I16.0]  Yes   • HTN (hypertension) [I10]  Yes   • CKD (chronic kidney disease) stage 4, GFR 15-29 ml/min [N18.4]  Yes   • Type 2 diabetes mellitus with hyperglycemia, with long-term current use of insulin [E11.65, Z79.4]  Not Applicable   • Primary hypothyroidism [E03.9]  Yes      Resolved Hospital Problems   No resolved problems to display.       84 y.o. female admitted with Hypertensive urgency.    Hypertensive urgency-resolved  Essential hypertensive   BP acceptable off Cardene drip  Continue bisoprolol, amlodipine, hydralazine, torsemide    Type 2 diabetes mellitus with hyperglycemia, with long-term current use of insulin  Chronic   Her blood glucose is elevated but acceptable   SSI for hyperglycemia   Hypoglycemia treatment per protocol     CKD (chronic kidney disease) stage 4, GFR 15-29 ml/min  Chronic   Crt levels are stable at her baseline   Continue home torsemide   Dose, daily weights  Avoid nephrotoxins, hypovolemia, hypotension    Primary Hypothyroidism   Chronic   She had noted elevated TSH above 8   Levothyroxine was increased to 150 mcg  She will need repeat thyroid panel in 4-6 weeks    Leukocytosis   Has resolved    Bilateral lower extremity edema, history of recent cellulitis, with right lower extremity wound  Care following appreciate their assistance and recommendations      SCDs for DVT prophylaxis.  Full code.  Discussed with patient, nursing staff, and care team on multidisciplinary rounds.  Anticipate discharge to SNU facility once arrangements have been made.  Expected Discharge Date: 2/21/2025; Expected Discharge Time:       SERGE Medina  [facility]  02/19/25  12:23 EST

## 2025-02-19 NOTE — CASE MANAGEMENT/SOCIAL WORK
Continued Stay Note  [facility]     Patient Name: Halie Guidry  MRN: [id number]  Today's Date: 2/19/2025    Admit Date: 2/17/2025    Plan: [facility] PENDING precert.   Discharge Plan       Row Name 02/19/25 1305       Plan    Plan [facility] PENDING precert.    Plan Comments Pt accepted at [facility], [facility] pending precert.  Pt notified.  Her choice is [facility].  Precert initated - await determination. ............Vannessa MARISCAL/ ODIN      Row Name 02/19/25 1010       Plan    Plan SNF pending accepting facility and precert    Plan Comments S/W pt at bedside.  Facesheet info confirmed.  Pt has lived at [facility] since July.  She uses a walker for w/c for mobility.  Her son provides transport as needed.  [facility]  and has been to several rehab facilities.  [facility] has said pt need to get stronger/ improve mobility before returning there.  Pt is in agreement w/ looking for a SNF facility - would require precert. CMS Compare info from Medicare.gov reviewed.  Per pt's approval, referrals sent to several local SNF facilities - evals pending. ............Vann[facility] MARISCAL/ ODIN                   Discharge Codes    No documentation.                 Expected Discharge Date and Time       Expected Discharge Date Expected Discharge Time    Feb 21, 2025               Vannessa Argueta RN

## 2025-02-19 NOTE — CASE MANAGEMENT/SOCIAL WORK
Post-Acute Authorization Submission      Post Acute Pre-Cert Documentation  Request Submitted by Facility - Type:: Hospital  Post-Acute Authorization Type Submitted:: SNF  Date Post Acute Pre-Cert Inititated per Facility: 02/19/25  Accepting Facility: [facility] POST ACUTE  Hospital Discharge Date Requested: 02/20/25  All Clinicals Submitted?: Yes  Had Accepting Facility at Time of Submission: Yes  Response Communicated to:: , Accepting Facility Liaison  Authorization Number:: PENDING [id number]              JESSICA Hernandes

## 2025-02-19 NOTE — THERAPY EVALUATION
Acute Care - Physical Therapy Initial Evaluation  [facility]     Patient Name: Halie Guidry  : 1940  MRN: [id number]  Today's Date: 2025      Visit Dx:     ICD-10-CM ICD-9-CM   1. Accelerated hypertension  I10 401.0   2. General weakness  R53.1 780.79   3. Inability to walk  R26.2 719.7   4. Leukocytosis, unspecified type  D72.829 288.60   5. Anemia, unspecified type  D64.9 285.9   6. Chronic kidney disease, unspecified CKD stage  N18.9 585.9   7. Hyperglycemia  R73.9 790.29     Patient Active Problem List   Diagnosis    Compression fracture    Lumbar degenerative disc disease    Type 2 diabetes mellitus with hyperglycemia, with long-term current use of insulin    Hyperlipidemia    Benign essential hypertension    Primary hypothyroidism    Neuropathy    Osteoporosis    Proteinuria    Tobacco abuse    Generalized weakness    Spinal stenosis    Scoliosis    Arthritis    VBI (vertebrobasilar insufficiency)    Orthostatic hypotension    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    Severe hypothyroidism    Noncompliance with medication regimen    Vitamin D deficiency disease    Tremor    Seizure    Thoracic degenerative disc disease    Acute kidney injury (VIPUL) with acute tubular necrosis (ATN)    Hyperglycemia    Type 2 diabetes mellitus with hyperglycemia    Lower abdominal pain    Transaminitis    Emphysematous cystitis    Choledocholithiasis    Hypokalemia    Hypoxia    Generalized abdominal pain    History of Clostridium difficile infection    History of ERCP    Hypomagnesemia    Anxiety disorder    Neuropathic pain    Intertrigo    Weakness of both lower extremities    Accelerated hypertension    Acute cystitis without hematuria    Left lower lobe pneumonia    Cellulitis and abscess of left lower extremity    Benign hypertension with CKD (chronic kidney disease) stage IV    Peripheral edema    Primary malignant neuroendocrine tumor of pancreas    Encounter for long-term  (current) use of high-risk medication    Diabetic foot ulcer    CKD (chronic kidney disease) stage 4, GFR 15-29 ml/min    Pressure injury of buttock, stage 2    HTN (hypertension)    Anemia due to chronic kidney disease    Bilateral lower extremity edema    Cellulitis of right lower extremity    CKD (chronic kidney disease) stage 4, GFR 15-29 ml/min    Acute CHF    Bilateral cellulitis of lower leg    Acute UTI    UTI (urinary tract infection)    Acute UTI (urinary tract infection)    Metabolic acidosis    Cobalamin deficiency    Dependent edema    Gastroesophageal reflux disease    Mild neurocognitive disorder    Pancreatic neoplasm    Congestive heart failure    CHF exacerbation    Acute exacerbation of CHF (congestive heart failure)    Hypertensive urgency     Past Medical History:   Diagnosis Date    Acute metabolic encephalopathy 06/24/2022    Anxiety     Arthritis     Benign essential hypertension 08/20/2014    Bleeding disorder     Depression     Diabetes     Diabetes mellitus, type 2     Disc degeneration, lumbar     Headache, tension-type     Hyperlipidemia     Hypothyroidism     Neuropathy     Osteoporosis 09/09/2015    Pancreatic mass     Sept 2023, on Monthly Octreotide Injection    Peripheral neuropathy     Rotator cuff tear, left     Scoliosis     Shoulder pain     LEFT, TORN ROTATOR CUFF S/P FALL    Spinal stenosis      Past Surgical History:   Procedure Laterality Date    APPENDECTOMY      BILATERAL BREAST REDUCTION Bilateral 08/2015    CATARACT EXTRACTION  03/2015    CHOLECYSTECTOMY WITH INTRAOPERATIVE CHOLANGIOGRAM N/A 3/27/2022    Procedure: CHOLECYSTECTOMY LAPAROSCOPIC INTRAOPERATIVE CHOLANGIOGRAM;  Surgeon: Aiyana Hill MD;  Location: [facility] OR;  Service: General;  Laterality: N/A;    COLONOSCOPY  06/05/2015    WNL    ERCP N/A 2/25/2022    Procedure: ENDOSCOPIC RETROGRADE CHOLANGIOPANCREATOGRAPHY with sphincterotomy and balloon sweep;  Surgeon: Chilo Wilhelm MD;  Location: [facility]  ENDOSCOPY;  Service: Gastroenterology;  Laterality: N/A;  PRE/POST - CBD stones    ERCP N/A 3/28/2022    Procedure: ENDOSCOPIC RETROGRADE CHOLANGIOPANCREATOGRAPHY WITH SPHINCTEROTOMY AND BALLOON SWEEP;  Surgeon: Chilo Wilhelm MD;  Location: [facility] ENDOSCOPY;  Service: Gastroenterology;  Laterality: N/A;  PRE: COMMON DUCT STONE  POST: COMMON DUCT STONE    KYPHOPLASTY      REDUCTION MAMMAPLASTY      TONSILLECTOMY       PT Assessment (Last 12 Hours)       PT Evaluation and Treatment       Row Name 02/19/25 1600          Physical Therapy Time and Intention    Subjective Information no complaints  -     Document Type evaluation  -     Mode of Treatment individual therapy;physical therapy  -     Patient Effort good  -       Row Name 02/19/25 1600          General Information    Patient Profile Reviewed yes  -     Prior Level of Function w/c or scooter;independent:  -     Existing Precautions/Restrictions fall  -     Barriers to Rehab previous functional deficit  -       Row Name 02/19/25 1600          Living Environment    Current Living Arrangements assisted living facility  -       Row Name 02/19/25 1600          Pain    Pre/Posttreatment Pain Comment Grimacing at lower extremities w mobility  -       Row Name 02/19/25 1600          Cognition    Orientation Status (Cognition) oriented x 3  -       Row Name 02/19/25 1600          Range of Motion Comprehensive    General Range of Motion bilateral lower extremity ROM WFL  -       Row Name 02/19/25 1600          Strength Comprehensive (MMT)    Comment, General Manual Muscle Testing (MMT) Assessment Lower extremities at least 3 out of 5  -       Row Name 02/19/25 1600          Bed Mobility    Supine-Sit [place] (Bed Mobility) minimum assist (75% patient effort)  [facility]     Sit-Supine [place] (Bed Mobility) moderate assist (50% patient effort)  -     Assistive Device (Bed Mobility) bed rails;head of bed elevated;repositioning sheet   -LH       Row Name 02/19/25 1600          Balance    Static Sitting Balance supervision  -LH     Position, Sitting Balance unsupported;sitting edge of bed  -LH       Row Name 02/19/25 1600          Motor Skills    Therapeutic Exercise --  Ankle pumps, LAQ, MIP x 10  -LH       Row Name             Wound 02/18/25 0830 Left lower leg    Wound - Properties Group Placement Date: 02/18/25  -AD Placement Time: 0830 -AD Side: Left  -AD Orientation: lower  -AD Location: leg  -AD Primary Wound Type: Other  -AD Additional Comments: Cellulitis  -AD    Retired Wound - Properties Group Placement Date: 02/18/25  -AD Placement Time: 0830 -AD Side: Left  -AD Orientation: lower  -AD Location: leg  -AD Primary Wound Type: Other  -AD Additional Comments: Cellulitis  -AD    Retired Wound - Properties Group Placement Date: 02/18/25  -AD Placement Time: 0830 -AD Side: Left  -AD Orientation: lower  -AD Location: leg  -AD Primary Wound Type: Other  -AD Additional Comments: Cellulitis  -AD    Retired Wound - Properties Group Date first assessed: 02/18/25 -AD Time first assessed: 0830 -AD Side: Left  -AD Location: leg  -AD Primary Wound Type: Other  -AD Additional Comments: Cellulitis  -AD      Row Name             Wound 01/15/25 Right lower leg cellulitis;other (see comments)    Wound - Properties Group Placement Date: 01/15/25  -JA Side: Right  -JA Orientation: lower  -JA Location: leg  -JA Primary Wound Type: Other  -JA Type: cellulitis;other (see comments)  -JA, ulcer  Present on Original Admission: Y  -JA    Retired Wound - Properties Group Placement Date: 01/15/25  -JA Present on Original Admission: Y  -JA Side: Right  -JA Orientation: lower  -JA Location: leg  -JA Primary Wound Type: Other  -JA Type: cellulitis;other (see comments)  -JA, ulcer     Retired Wound - Properties Group Placement Date: 01/15/25  -JA Present on Original Admission: Y  -JA Side: Right  -JA Orientation: lower  -JA Location: leg  -JA Primary Wound Type: Other   -JA Type: cellulitis;other (see comments)  -JA, ulcer     Retired Wound - Properties Group Date first assessed: 01/15/25  -JA Present on Original Admission: Y  -JA Side: Right  -JA Location: leg  -JA Primary Wound Type: Other  -JA Type: cellulitis;other (see comments)  -JA, ulcer       Row Name             Wound 02/18/25 0830 Left anterior hip    Wound - Properties Group Placement Date: 02/18/25 -AD Placement Time: 0830 -AD Side: Left  -AD Orientation: anterior  -AD Location: hip  -AD    Retired Wound - Properties Group Placement Date: 02/18/25 -AD Placement Time: 0830 -AD Side: Left  -AD Orientation: anterior  -AD Location: hip  -AD    Retired Wound - Properties Group Placement Date: 02/18/25 -AD Placement Time: 0830 -AD Side: Left  -AD Orientation: anterior  -AD Location: hip  -AD    Retired Wound - Properties Group Date first assessed: 02/18/25 -AD Time first assessed: 0830  -AD Side: Left  -AD Location: hip  -AD      Row Name             Wound 02/18/25 0830 Right anterior hip    Wound - Properties Group Placement Date: 02/18/25 -AD Placement Time: 0830 -AD Side: Right  -AD Orientation: anterior  -AD Location: hip  -AD    Retired Wound - Properties Group Placement Date: 02/18/25 -AD Placement Time: 0830  -AD Side: Right  -AD Orientation: anterior  -AD Location: hip  -AD    Retired Wound - Properties Group Placement Date: 02/18/25 -AD Placement Time: 0830  -AD Side: Right  -AD Orientation: anterior  -AD Location: hip  -AD    Retired Wound - Properties Group Date first assessed: 02/18/25 -AD Time first assessed: 0830  -AD Side: Right  -AD Location: hip  -AD      Row Name             Wound 10/10/24 1400 Bilateral lateral coccyx    Wound - Properties Group Placement Date: 10/10/24  -DT Placement Time: 1400  -DT Side: Bilateral  -DT Orientation: lateral  -DT Location: coccyx  -DT    Retired Wound - Properties Group Placement Date: 10/10/24  -DT Placement Time: 1400  -DT Side: Bilateral  -DT Orientation:  lateral  -DT Location: coccyx  -DT    Retired Wound - Properties Group Placement Date: 10/10/24  -DT Placement Time: 1400  -DT Side: Bilateral  -DT Orientation: lateral  -DT Location: coccyx  -DT    Retired Wound - Properties Group Date first assessed: 10/10/24  -DT Time first assessed: 1400  -DT Side: Bilateral  -DT Location: coccyx  -DT      Row Name             Wound 10/14/24 1826 Right distal calf diabetic ulcer    Wound - Properties Group Placement Date: 10/14/24  -MR Placement Time: 1826  -MR Side: Right  -MR Orientation: distal  -MR Location: calf  -MR Primary Wound Type: Diabetic ulc  -MR Type: diabetic ulcer  -MR    Retired Wound - Properties Group Placement Date: 10/14/24  -MR Placement Time: 1826  -MR Side: Right  -MR Orientation: distal  -MR Location: calf  -MR Primary Wound Type: Diabetic ulc  -MR Type: diabetic ulcer  -MR    Retired Wound - Properties Group Placement Date: 10/14/24  -MR Placement Time: 1826  -MR Side: Right  -MR Orientation: distal  -MR Location: calf  -MR Primary Wound Type: Diabetic ulc  -MR Type: diabetic ulcer  -MR    Retired Wound - Properties Group Date first assessed: 10/14/24  -MR Time first assessed: 1826  -MR Side: Right  -MR Location: calf  -MR Primary Wound Type: Diabetic ulc  -MR Type: diabetic ulcer  -MR      Row Name 02/19/25 1600          Plan of Care Review    Plan of Care Reviewed With patient  -     Outcome Evaluation Patient 84-year-old female presented to ED with generalized weakness and diarrhea.  Patient reports in June of last year she is primarily wheelchair level she self propels independently.  Reports she just ordered a power chair and that should be available soon.  Patient reports she was recently at a rehab center and was progressing to take steps walker.  PT exam patient presents with generalized weakness may benefit from skilled PT anticipates SNU at discharge.  -       Row Name 02/19/25 1600          Positioning and Restraints    Pre-Treatment  Position in bed  -     Post Treatment Position bed  -     In Bed supine;call light within reach;encouraged to call for assist;exit alarm on  -[facility] Name 02/19/25 1600          Therapy Assessment/Plan (PT)    Rehab Potential (PT) good  -     Criteria for Skilled Interventions Met (PT) yes  -[facility] Name 02/19/25 1600          PT Evaluation Complexity    History, PT Evaluation Complexity 1-2 personal factors and/or comorbidities  -     Examination of Body Systems (PT Eval Complexity) total of 3 or more elements  -     Clinical Presentation (PT Evaluation Complexity) stable  -     Clinical Decision Making (PT Evaluation Complexity) low complexity  -     Overall Complexity (PT Evaluation Complexity) low complexity  -[facility] Name 02/19/25 1600          Physical Therapy Goals    Bed Mobility Goal Selection (PT) bed mobility, PT goal 1  -     Transfer Goal Selection (PT) transfer, PT goal 1  -     Gait Training Goal Selection (PT) gait training, PT goal 1  -LH       Row Name 02/19/25 1600          Bed Mobility Goal 1 (PT)    Activity/Assistive Device (Bed Mobility Goal 1, PT) bed mobility activities, all  -     [place] Level/Cues Needed (Bed Mobility Goal 1, PT) contact guard required  -     Time Frame (Bed Mobility Goal 1, PT) 2 weeks  -[facility] Name 02/19/25 1600          Transfer Goal 1 (PT)    Activity/Assistive Device (Transfer Goal 1, PT) sit-to-stand/stand-to-sit;bed-to-chair/chair-to-bed;transfers, all;walker, rolling  -     [place] Level/Cues Needed (Transfer Goal 1, PT) contact guard required  -     Time Frame (Transfer Goal 1, PT) 2 weeks  -LH       Row Name 02/19/25 1600          Gait Training Goal 1 (PT)    Activity/Assistive Device (Gait Training Goal 1, PT) assistive device use;walker, rolling  -     [place] Level (Gait Training Goal 1, PT) contact guard required  -     Distance (Gait Training Goal 1, PT) 20  -LH     Time Frame (Gait Training  Goal 1, PT) 2 weeks  -               User Key  (r) = Recorded By, (t) = Taken By, (c) = Cosigned By      Initials Name Provider Type     Gwendolyn Mon, PT Physical Therapist    Dhiraj Cotto, RN Registered Nurse    Diane Ordoñez, RN Registered Nurse    Clarisse Steiner, RN Registered Nurse    Merlyn Levi RN Registered Nurse                    Physical Therapy Education       Title: PT OT SLP Therapies (In Progress)       Topic: Physical Therapy (In Progress)       Point: Mobility training (In Progress)       Learning Progress Summary            Patient Acceptance, E, NR by  at 2/19/2025 1625                      Point: Home exercise program (In Progress)       Learning Progress Summary            Patient Acceptance, E, NR by  at 2/19/2025 1625                      Point: Body mechanics (In Progress)       Learning Progress Summary            Patient Acceptance, E, NR by  at 2/19/2025 1625                      Point: Precautions (In Progress)       Learning Progress Summary            Patient Acceptance, E, NR by  at 2/19/2025 1625                                      User Key       Initials Effective Dates Name Provider Type Discipline     06/16/21 -  Gwendolyn Mon, PT Physical Therapist PT                  PT Recommendation and Plan  Anticipated Discharge Disposition (PT): skilled nursing facility  Planned Therapy Interventions (PT): balance training, bed mobility training, gait training, home exercise program, ROM (range of motion), stair training, strengthening, stretching, transfer training  Plan of Care Reviewed With: patient  Outcome Evaluation: Patient 84-year-old female presented to ED with generalized weakness and diarrhea.  Patient reports in June of last year she is primarily wheelchair level she self propels independently.  Reports she just ordered a power chair and that should be available soon.  Patient reports she was recently at a rehab center and was progressing to take  steps walker.  PT exam patient presents with generalized weakness may benefit from skilled PT anticipates SNU at discharge.   Outcome Measures       Row Name 02/19/25 1600             How much help from another person do you currently need...    Turning from your back to your side while in flat bed without using bedrails? 3  -LH      Moving from lying on back to sitting on the side of a flat bed without bedrails? 3  -LH      Moving to and from a bed to a chair (including a wheelchair)? 3  -LH      Standing up from a chair using your arms (e.g., wheelchair, bedside chair)? 2  -LH      Climbing 3-5 steps with a railing? 2  -LH      To walk in hospital room? 2  -      AM-PAC 6 Clicks Score (PT) 15  -                User Key  (r) = Recorded By, (t) = Taken By, (c) = Cosigned By      Initials Name Provider Type     Gwendolyn Mon, PT Physical Therapist                     Time Calculation:    PT Charges       Row Name 02/19/25 1626             Time Calculation    Start Time 1425  -      Stop Time 1455  -      Time Calculation (min) 30 min  -      PT Received On 02/19/25  -      PT - Next Appointment 02/21/25  -      PT Goal Re-Cert Due Date 03/05/25  -         Time Calculation- PT    Total Timed Code Minutes- PT 15 minute(s)  -                User Key  (r) = Recorded By, (t) = Taken By, (c) = Cosigned By      Initials Name Provider Type     Gwendolyn Mon, PT Physical Therapist                  Therapy Charges for Today       Code Description Service Date Service Provider Modifiers Qty    [id number]  PT EVAL MOD COMPLEXITY 2 2/19/2025 Gwendolyn Mon, PT GP 1    [id number]  PT THER PROC EA 15 MIN 2/19/2025 Gwendolyn Mon, PT GP 1            PT G-Codes  Outcome Measure Options: AM-PAC 6 Clicks Daily Activity (OT)  AM-PAC 6 Clicks Score (PT): 15  AM-PAC 6 Clicks Score (OT): 12    Gwendolyn Mon PT  2/19/2025

## 2025-02-19 NOTE — PLAN OF CARE
Goal Outcome Evaluation:  Plan of Care Reviewed With: patient           Outcome Evaluation: Patient 84-year-old female presented to ED with generalized weakness and diarrhea.  Patient reports in June of last year she is primarily wheelchair level she self propels independently.  Reports she just ordered a power chair and that should be available soon.  Patient reports she was recently at a rehab center and was progressing to take steps walker.  PT exam patient presents with generalized weakness may benefit from skilled PT anticipates SNU at discharge.    Anticipated Discharge Disposition (PT): skilled nursing facility

## 2025-02-19 NOTE — CASE MANAGEMENT/SOCIAL WORK
Discharge Planning Assessment  [facility]     Patient Name: Halie Guidry  MRN: [id number]  Today's Date: 2/19/2025    Admit Date: 2/17/2025    Plan: SNF pending accepting facility and precert   Discharge Needs Assessment       Row Name 02/19/25 0950       Living Environment    People in Home facility resident    Current Living Arrangements assisted living facility    Potentially Unsafe Housing Conditions none    Provides Primary Care For no one, unable/limited ability to care for self    Family Caregiver if Needed child(betariz), adult    Quality of Family Relationships supportive    Able to Return to Prior Arrangements no       Resource/Environmental Concerns    Transportation Concerns none       Transportation Needs    In the past 12 months, has lack of transportation kept you from medical appointments or from getting medications? no    In the past 12 months, has lack of transportation kept you from meetings, work, or from getting things needed for daily living? No       Food Insecurity    Within the past 12 months, you worried that your food would run out before you got the money to buy more. Never true    Within the past 12 months, the food you bought just didn't last and you didn't have money to get more. Never true       Transition Planning    Patient/Family Anticipates Transition to inpatient rehabilitation facility    Patient/Family Anticipated Services at Transition skilled nursing    Transportation Anticipated health plan transportation       Discharge Needs Assessment    Current Outpatient/Agency/Support Group assisted living facility    Equipment Currently Used at Home wheelchair;walker, rolling    Concerns to be Addressed discharge planning    Do you want help finding or keeping work or a job? I do not need or want help    Do you want help with school or training? For example, starting or completing job training or getting a high school diploma, GED or equivalent No    Outpatient/Agency/Support  Group Needs skilled nursing facility    Discharge Facility/Level of Care Needs nursing facility, skilled    Provided Post Acute Provider List? Yes    Post Acute Provider List Nursing Home    Provided Post Acute Provider Quality & Resource List? Yes    Post Acute Provider Quality and Resource List Nursing Home    Delivered To Patient    Method of Delivery In person    Offered/Gave Vendor List yes    Current Discharge Risk physical impairment                   Discharge Plan       Row Name 02/19/25 1010       Plan    Plan SNF pending accepting facility and precert    Plan Comments S/W pt at bedside.  Facesheet info confirmed.  Pt has lived at [facility] assisted living since July.  She uses a walker for w/c for mobility.  Her son provides transport as needed.  Pt has use [facility]  and has been to several rehab facilities.  [facility] has said pt need to get stronger/ improve mobility before returning there.  Pt is in agreement w/ looking for a SNF facility - would require precert. CMS Compare info from Medicare.gov reviewed.  Per pt's approval, referrals sent to several local SNF facilities - evals pending. ............Vannessa MARISCAL/ ODIN                  Continued Care and Services - Admitted Since 2/17/2025       Destination       Service Provider Request Status Services Address Phone Fax Patient Preferred    [facility] Accepted -- [address] [facility] [address] [phone] --    [facility] - [facility] Sent [facility] [address] [facility] [id number] [phone] --    [facility] Pending - Request Sent [facility] [address] [facility] [id number] [phone] --    [facility] - Request Sent -- [address] [place] [address] [phone] --    [facility] Pending - No Request Sent -- [address]  [facility] [address] [phone] --                  Selected Continued Care - Episodes Includes continued care and service providers with selected services from the active episodes listed below      [facility] Episode start date: 9/10/2024   There are no active outsourced providers for this episode.                 Selected Continued Care - Prior Encounters Includes continued care and service providers with selected services from prior encounters from 11/19/2024 to 2/19/2025      Discharged on 1/21/2025 Admission date: 1/14/2025 - Discharge disposition: Skilled Nursing Facility (DC - External)      Destination       Service Provider Services Address Phone Fax Patient Preferred    [facility] [place] [facility] [address] , [facility] [id number] [phone] --              Home Medical Care       Service Provider Services Address Phone Fax Patient Preferred    [facility] [address], [address], [facility] [address] [phone] --                             Demographic Summary       Row Name 02/19/25 0950       General Information    Admission Type observation    Arrived From home    Required Notices Provided Observation Status Notice    Referral Source admission list    Reason for Consult discharge planning    Preferred Language English      Row Name 02/19/25 0833       General Information    Admission Type --    Arrived From --    Required Notices Provided --    Referral Source --    Reason for Consult --    Preferred Language --                   Functional Status       Row Name 02/19/25 0950       Functional Status    Usual Activity Tolerance fair       Functional Status, IADL    Medications assistive person    Meal Preparation assistive person    Housekeeping assistive person    Laundry assistive person    Shopping assistive person    If for any reason you need help with day-to-day  activities such as bathing, preparing meals, shopping, managing finances, etc., do you get the help you need? I could use a little more help       Mental Status    General Appearance WDL WDL       Mental Status Summary    Recent Changes in Mental Status/Cognitive Functioning no changes       Employment/    Employment Status retired      Row Name 02/19/25 0833       Functional Status    Usual Activity Tolerance --                          Vannessa Argueta, RN

## 2025-02-19 NOTE — PLAN OF CARE
Problem: Adult Inpatient Plan of Care  Goal: Plan of Care Review  Outcome: Progressing  Flowsheets (Taken 2/19/2025 0606)  Progress: no change  Plan of Care Reviewed With: patient  Goal: Patient-Specific Goal (Individualized)  Outcome: Progressing  Goal: Absence of Hospital-Acquired Illness or Injury  Outcome: Progressing  Intervention: Identify and Manage Fall Risk  Recent Flowsheet Documentation  Taken 2/19/2025 0603 by Kelsey Henry, RN  Safety Promotion/Fall Prevention:   activity supervised   clutter free environment maintained   fall prevention program maintained   nonskid shoes/slippers when out of bed   room organization consistent   safety round/check completed  Taken 2/19/2025 0434 by Kelsey Henry, RN  Safety Promotion/Fall Prevention:   activity supervised   clutter free environment maintained   fall prevention program maintained   nonskid shoes/slippers when out of bed   room organization consistent   safety round/check completed  Taken 2/19/2025 0215 by Kelsey Henry, RN  Safety Promotion/Fall Prevention:   activity supervised   clutter free environment maintained   fall prevention program maintained   nonskid shoes/slippers when out of bed   room organization consistent   safety round/check completed  Taken 2/19/2025 0035 by Kelsey Henry, RN  Safety Promotion/Fall Prevention:   activity supervised   assistive device/personal items within reach   clutter free environment maintained   fall prevention program maintained   nonskid shoes/slippers when out of bed   room organization consistent   safety round/check completed  Taken 2/19/2025 0000 by Kelsey Henry, RN  Safety Promotion/Fall Prevention:   activity supervised   clutter free environment maintained   fall prevention program maintained   nonskid shoes/slippers when out of bed   room organization consistent   safety round/check completed  Taken 2/18/2025 2209 by Kelsey Henry, RN  Safety Promotion/Fall  Prevention:   activity supervised   clutter free environment maintained   fall prevention program maintained   nonskid shoes/slippers when out of bed   room organization consistent   safety round/check completed  Taken 2/18/2025 2045 by Kelsey Henry, RN  Safety Promotion/Fall Prevention:   activity supervised   assistive device/personal items within reach   clutter free environment maintained   fall prevention program maintained   nonskid shoes/slippers when out of bed   room organization consistent   safety round/check completed  Intervention: Prevent Skin Injury  Recent Flowsheet Documentation  Taken 2/19/2025 0035 by Kelsey Henry, RN  Body Position: position changed independently  Taken 2/18/2025 2045 by Kelsey Henry RN  Body Position: position changed independently  Skin Protection: incontinence pads utilized  Intervention: Prevent and Manage VTE (Venous Thromboembolism) Risk  Recent Flowsheet Documentation  Taken 2/19/2025 0035 by Kelsey Henry, PAUL  VTE Prevention/Management:   SCDs (sequential compression devices) off   patient refused intervention  Taken 2/18/2025 2045 by Kelsey Henry RN  VTE Prevention/Management: (pt on heparin)   other (see comments)   SCDs (sequential compression devices) off   patient refused intervention  Intervention: Prevent Infection  Recent Flowsheet Documentation  Taken 2/19/2025 0603 by Kelsey Henry RN  Infection Prevention:   rest/sleep promoted   hand hygiene promoted   single patient room provided  Taken 2/19/2025 0434 by Kelsey Henry RN  Infection Prevention:   rest/sleep promoted   hand hygiene promoted   single patient room provided  Taken 2/19/2025 0215 by Kelsey Henry, RN  Infection Prevention:   rest/sleep promoted   hand hygiene promoted   single patient room provided  Taken 2/19/2025 0035 by Kelsey Henry RN  Infection Prevention:   hand hygiene promoted   rest/sleep promoted   single patient room  provided  Taken 2/19/2025 0000 by Kelsey Henry RN  Infection Prevention:   rest/sleep promoted   hand hygiene promoted   single patient room provided  Taken 2/18/2025 2209 by Kelsey Henry RN  Infection Prevention:   rest/sleep promoted   hand hygiene promoted   single patient room provided  Taken 2/18/2025 2045 by Kelsey Henry, RN  Infection Prevention:   hand hygiene promoted   rest/sleep promoted   single patient room provided  Goal: Optimal Comfort and Wellbeing  Outcome: Progressing  Intervention: Provide Person-Centered Care  Recent Flowsheet Documentation  Taken 2/19/2025 0035 by Kelsey Henry RN  Trust Relationship/Rapport: care explained  Taken 2/18/2025 2045 by Kelsey Henry RN  Trust Relationship/Rapport: care explained  Goal: Readiness for Transition of Care  Outcome: Progressing     Problem: Comorbidity Management  Goal: Blood Pressure in Desired Range  Outcome: Progressing  Intervention: Maintain Blood Pressure Management  Recent Flowsheet Documentation  Taken 2/19/2025 0035 by Kelsey Henry RN  Medication Review/Management: medications reviewed  Taken 2/18/2025 2045 by Kelsey Henry RN  Medication Review/Management: medications reviewed     Problem: Skin Injury Risk Increased  Goal: Skin Health and Integrity  Outcome: Progressing  Intervention: Optimize Skin Protection  Recent Flowsheet Documentation  Taken 2/19/2025 0035 by Kelsey Henry, RN  Activity Management: activity encouraged  Pressure Reduction Techniques: frequent weight shift encouraged  Head of Bed (HOB) Positioning: HOB elevated  Pressure Reduction Devices: pressure-redistributing mattress utilized  Taken 2/18/2025 2045 by Kelsey Henry, RN  Activity Management: activity encouraged  Pressure Reduction Techniques: frequent weight shift encouraged  Head of Bed (HOB) Positioning: HOB elevated  Pressure Reduction Devices: pressure-redistributing mattress utilized  Skin Protection:  incontinence pads utilized     Problem: Fall Injury Risk  Goal: Absence of Fall and Fall-Related Injury  Outcome: Progressing  Intervention: Identify and Manage Contributors  Recent Flowsheet Documentation  Taken 2/19/2025 0035 by Kelsey Henry, RN  Medication Review/Management: medications reviewed  Taken 2/18/2025 2045 by Kelsey Henry, RN  Medication Review/Management: medications reviewed  Intervention: Promote Injury-Free Environment  Recent Flowsheet Documentation  Taken 2/19/2025 0603 by Kelsey Henry, RN  Safety Promotion/Fall Prevention:   activity supervised   clutter free environment maintained   fall prevention program maintained   nonskid shoes/slippers when out of bed   room organization consistent   safety round/check completed  Taken 2/19/2025 0434 by Kelsey Henry, RN  Safety Promotion/Fall Prevention:   activity supervised   clutter free environment maintained   fall prevention program maintained   nonskid shoes/slippers when out of bed   room organization consistent   safety round/check completed  Taken 2/19/2025 0215 by Kelsey Henry, RN  Safety Promotion/Fall Prevention:   activity supervised   clutter free environment maintained   fall prevention program maintained   nonskid shoes/slippers when out of bed   room organization consistent   safety round/check completed  Taken 2/19/2025 0035 by Kelsey Henry, RN  Safety Promotion/Fall Prevention:   activity supervised   assistive device/personal items within reach   clutter free environment maintained   fall prevention program maintained   nonskid shoes/slippers when out of bed   room organization consistent   safety round/check completed  Taken 2/19/2025 0000 by Kelsey Henry, RN  Safety Promotion/Fall Prevention:   activity supervised   clutter free environment maintained   fall prevention program maintained   nonskid shoes/slippers when out of bed   room organization consistent   safety round/check  completed  Taken 2/18/2025 2209 by Kelsey Henry, RN  Safety Promotion/Fall Prevention:   activity supervised   clutter free environment maintained   fall prevention program maintained   nonskid shoes/slippers when out of bed   room organization consistent   safety round/check completed  Taken 2/18/2025 2045 by Kelsey Henry, RN  Safety Promotion/Fall Prevention:   activity supervised   assistive device/personal items within reach   clutter free environment maintained   fall prevention program maintained   nonskid shoes/slippers when out of bed   room organization consistent   safety round/check completed   Goal Outcome Evaluation:  Plan of Care Reviewed With: patient        Progress: no change

## 2025-02-20 LAB
ANION GAP SERPL CALCULATED.3IONS-SCNC: 15 MMOL/L (ref 5–15)
BUN SERPL-MCNC: 49 MG/DL (ref 8–23)
BUN/CREAT SERPL: 18.8 (ref 7–25)
CALCIUM SPEC-SCNC: 8.5 MG/DL (ref 8.6–10.5)
CHLORIDE SERPL-SCNC: 106 MMOL/L (ref 98–107)
CO2 SERPL-SCNC: 20 MMOL/L (ref 22–29)
CREAT SERPL-MCNC: 2.61 MG/DL (ref 0.57–1)
DEPRECATED RDW RBC AUTO: 47.4 FL (ref 37–54)
EGFRCR SERPLBLD CKD-EPI 2021: 17.6 ML/MIN/1.73
ERYTHROCYTE [DISTWIDTH] IN BLOOD BY AUTOMATED COUNT: 15.3 % (ref 12.3–15.4)
GLUCOSE BLDC GLUCOMTR-MCNC: 116 MG/DL (ref 70–130)
GLUCOSE BLDC GLUCOMTR-MCNC: 144 MG/DL (ref 70–130)
GLUCOSE BLDC GLUCOMTR-MCNC: 181 MG/DL (ref 70–130)
GLUCOSE BLDC GLUCOMTR-MCNC: 77 MG/DL (ref 70–130)
GLUCOSE SERPL-MCNC: 146 MG/DL (ref 65–99)
HCT VFR BLD AUTO: 26.5 % (ref 34–46.6)
HGB BLD-MCNC: 8.6 G/DL (ref 12–15.9)
MCH RBC QN AUTO: 27.7 PG (ref 26.6–33)
MCHC RBC AUTO-ENTMCNC: 32.5 G/DL (ref 31.5–35.7)
MCV RBC AUTO: 85.5 FL (ref 79–97)
PLATELET # BLD AUTO: 294 10*3/MM3 (ref 140–450)
PMV BLD AUTO: 9.9 FL (ref 6–12)
POTASSIUM SERPL-SCNC: 3.7 MMOL/L (ref 3.5–5.2)
RBC # BLD AUTO: 3.1 10*6/MM3 (ref 3.77–5.28)
SODIUM SERPL-SCNC: 141 MMOL/L (ref 136–145)
WBC NRBC COR # BLD AUTO: 7.42 10*3/MM3 (ref 3.4–10.8)

## 2025-02-20 PROCEDURE — 97535 SELF CARE MNGMENT TRAINING: CPT

## 2025-02-20 PROCEDURE — 63710000001 INSULIN LISPRO (HUMAN) PER 5 UNITS: Performed by: HOSPITALIST

## 2025-02-20 PROCEDURE — 85027 COMPLETE CBC AUTOMATED: CPT | Performed by: NURSE PRACTITIONER

## 2025-02-20 PROCEDURE — 96372 THER/PROPH/DIAG INJ SC/IM: CPT

## 2025-02-20 PROCEDURE — G0378 HOSPITAL OBSERVATION PER HR: HCPCS

## 2025-02-20 PROCEDURE — 25010000002 HEPARIN (PORCINE) PER 1000 UNITS: Performed by: HOSPITALIST

## 2025-02-20 PROCEDURE — 82948 REAGENT STRIP/BLOOD GLUCOSE: CPT

## 2025-02-20 PROCEDURE — 63710000001 INSULIN GLARGINE PER 5 UNITS: Performed by: NURSE PRACTITIONER

## 2025-02-20 PROCEDURE — 97530 THERAPEUTIC ACTIVITIES: CPT

## 2025-02-20 PROCEDURE — 80048 BASIC METABOLIC PNL TOTAL CA: CPT | Performed by: NURSE PRACTITIONER

## 2025-02-20 RX ADMIN — SENNOSIDES AND DOCUSATE SODIUM 2 TABLET: 50; 8.6 TABLET ORAL at 10:54

## 2025-02-20 RX ADMIN — PANTOPRAZOLE SODIUM 40 MG: 40 TABLET, DELAYED RELEASE ORAL at 05:17

## 2025-02-20 RX ADMIN — PREGABALIN 75 MG: 50 CAPSULE ORAL at 08:54

## 2025-02-20 RX ADMIN — HEPARIN SODIUM 5000 UNITS: 5000 INJECTION INTRAVENOUS; SUBCUTANEOUS at 21:07

## 2025-02-20 RX ADMIN — DULOXETINE HYDROCHLORIDE 30 MG: 30 CAPSULE, DELAYED RELEASE ORAL at 08:55

## 2025-02-20 RX ADMIN — Medication 10 ML: at 20:45

## 2025-02-20 RX ADMIN — PREGABALIN 75 MG: 50 CAPSULE ORAL at 21:08

## 2025-02-20 RX ADMIN — ACETAMINOPHEN 650 MG: 325 TABLET, FILM COATED ORAL at 21:08

## 2025-02-20 RX ADMIN — TORSEMIDE 40 MG: 20 TABLET ORAL at 08:54

## 2025-02-20 RX ADMIN — HYDRALAZINE HYDROCHLORIDE 50 MG: 50 TABLET ORAL at 14:03

## 2025-02-20 RX ADMIN — FLUTICASONE PROPIONATE 2 SPRAY: 50 SPRAY, METERED NASAL at 08:55

## 2025-02-20 RX ADMIN — HYDRALAZINE HYDROCHLORIDE 50 MG: 50 TABLET ORAL at 21:08

## 2025-02-20 RX ADMIN — INSULIN GLARGINE 72 UNITS: 100 INJECTION, SOLUTION SUBCUTANEOUS at 08:53

## 2025-02-20 RX ADMIN — HYDRALAZINE HYDROCHLORIDE 50 MG: 50 TABLET ORAL at 05:17

## 2025-02-20 RX ADMIN — AMLODIPINE BESYLATE 5 MG: 5 TABLET ORAL at 08:54

## 2025-02-20 RX ADMIN — INSULIN LISPRO 4 UNITS: 100 INJECTION, SOLUTION INTRAVENOUS; SUBCUTANEOUS at 11:36

## 2025-02-20 RX ADMIN — Medication 10 ML: at 08:55

## 2025-02-20 RX ADMIN — LEVOTHYROXINE SODIUM 150 MCG: 0.07 TABLET ORAL at 05:17

## 2025-02-20 RX ADMIN — BISOPROLOL FUMARATE 5 MG: 5 TABLET ORAL at 08:54

## 2025-02-20 RX ADMIN — ATORVASTATIN CALCIUM 80 MG: 80 TABLET, FILM COATED ORAL at 21:08

## 2025-02-20 RX ADMIN — ACETAMINOPHEN 650 MG: 325 TABLET, FILM COATED ORAL at 08:53

## 2025-02-20 RX ADMIN — HEPARIN SODIUM 5000 UNITS: 5000 INJECTION INTRAVENOUS; SUBCUTANEOUS at 08:54

## 2025-02-20 NOTE — CASE MANAGEMENT/SOCIAL WORK
Post-Acute Authorization Submission      Post Acute Pre-Cert Documentation  Request Submitted by Facility - Type:: Hospital  Post-Acute Authorization Type Submitted:: SNF  Date Post Acute Pre-Cert Inititated per Facility: 02/19/25  Accepting Facility: [facility]  Hospital Discharge Date Requested: 02/20/25  All Clinicals Submitted?: Yes  Had Accepting Facility at Time of Submission: Yes  Response Communicated to::   Authorization Number:: [id number]              Matt Moran, PCT

## 2025-02-20 NOTE — THERAPY TREATMENT NOTE
Patient Name: Halie Guidry  : 1940    MRN: [id number]                              Today's Date: 2025       Admit Date: 2025    Visit Dx:     ICD-10-CM ICD-9-CM   1. Accelerated hypertension  I10 401.0   2. General weakness  R53.1 780.79   3. Inability to walk  R26.2 719.7   4. Leukocytosis, unspecified type  D72.829 288.60   5. Anemia, unspecified type  D64.9 285.9   6. Chronic kidney disease, unspecified CKD stage  N18.9 585.9   7. Hyperglycemia  R73.9 790.29     Patient Active Problem List   Diagnosis    Compression fracture    Lumbar degenerative disc disease    Type 2 diabetes mellitus with hyperglycemia, with long-term current use of insulin    Hyperlipidemia    Benign essential hypertension    Primary hypothyroidism    Neuropathy    Osteoporosis    Proteinuria    Tobacco abuse    Generalized weakness    Spinal stenosis    Scoliosis    Arthritis    VBI (vertebrobasilar insufficiency)    Orthostatic hypotension    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    Severe hypothyroidism    Noncompliance with medication regimen    Vitamin D deficiency disease    Tremor    Seizure    Thoracic degenerative disc disease    Acute kidney injury (VIPUL) with acute tubular necrosis (ATN)    Hyperglycemia    Type 2 diabetes mellitus with hyperglycemia    Lower abdominal pain    Transaminitis    Emphysematous cystitis    Choledocholithiasis    Hypokalemia    Hypoxia    Generalized abdominal pain    History of Clostridium difficile infection    History of ERCP    Hypomagnesemia    Anxiety disorder    Neuropathic pain    Intertrigo    Weakness of both lower extremities    Accelerated hypertension    Acute cystitis without hematuria    Left lower lobe pneumonia    Cellulitis and abscess of left lower extremity    Benign hypertension with CKD (chronic kidney disease) stage IV    Peripheral edema    Primary malignant neuroendocrine tumor of pancreas    Encounter for long-term (current) use of  high-risk medication    Diabetic foot ulcer    CKD (chronic kidney disease) stage 4, GFR 15-29 ml/min    Pressure injury of buttock, stage 2    HTN (hypertension)    Anemia due to chronic kidney disease    Bilateral lower extremity edema    Cellulitis of right lower extremity    CKD (chronic kidney disease) stage 4, GFR 15-29 ml/min    Acute CHF    Bilateral cellulitis of lower leg    Acute UTI    UTI (urinary tract infection)    Acute UTI (urinary tract infection)    Metabolic acidosis    Cobalamin deficiency    Dependent edema    Gastroesophageal reflux disease    Mild neurocognitive disorder    Pancreatic neoplasm    Congestive heart failure    CHF exacerbation    Acute exacerbation of CHF (congestive heart failure)    Hypertensive urgency     Past Medical History:   Diagnosis Date    Acute metabolic encephalopathy 06/24/2022    Anxiety     Arthritis     Benign essential hypertension 08/20/2014    Bleeding disorder     Depression     Diabetes     Diabetes mellitus, type 2     Disc degeneration, lumbar     Headache, tension-type     Hyperlipidemia     Hypothyroidism     Neuropathy     Osteoporosis 09/09/2015    Pancreatic mass     Sept 2023, on Monthly Octreotide Injection    Peripheral neuropathy     Rotator cuff tear, left     Scoliosis     Shoulder pain     LEFT, TORN ROTATOR CUFF S/P FALL    Spinal stenosis      Past Surgical History:   Procedure Laterality Date    APPENDECTOMY      BILATERAL BREAST REDUCTION Bilateral 08/2015    CATARACT EXTRACTION  03/2015    CHOLECYSTECTOMY WITH INTRAOPERATIVE CHOLANGIOGRAM N/A 3/27/2022    Procedure: CHOLECYSTECTOMY LAPAROSCOPIC INTRAOPERATIVE CHOLANGIOGRAM;  Surgeon: Aiyana Hill MD;  Location: [facility] MAIN OR;  Service: General;  Laterality: N/A;    COLONOSCOPY  06/05/2015    WNL    ERCP N/A 2/25/2022    Procedure: ENDOSCOPIC RETROGRADE CHOLANGIOPANCREATOGRAPHY with sphincterotomy and balloon sweep;  Surgeon: Chilo Wilhelm MD;  Location: [facility] ENDOSCOPY;   Service: Gastroenterology;  Laterality: N/A;  PRE/POST - CBD stones    ERCP N/A 3/28/2022    Procedure: ENDOSCOPIC RETROGRADE CHOLANGIOPANCREATOGRAPHY WITH SPHINCTEROTOMY AND BALLOON SWEEP;  Surgeon: Chilo Wilhelm MD;  Location: [facility] ENDOSCOPY;  Service: Gastroenterology;  Laterality: N/A;  PRE: COMMON DUCT STONE  POST: COMMON DUCT STONE    KYPHOPLASTY      REDUCTION MAMMAPLASTY      TONSILLECTOMY        General Information       Row Name 02/20/25 1309          OT Time and Intention    Subjective Information no complaints  -BC     Document Type therapy note (daily note)  -BC     Mode of Treatment individual therapy;occupational therapy  -BC     Patient Effort good  -BC       Row Name 02/20/25 1309          General Information    Patient Profile Reviewed yes  -BC     Existing Precautions/Restrictions fall  -BC       Row Name 02/20/25 1309          Cognition    Orientation Status (Cognition) oriented x 3  -BC       Row Name 02/20/25 1309          Safety Issues/Impairments Affecting Functional Mobility    Impairments Affecting Function (Mobility) balance;endurance/activity tolerance;strength  -BC               User Key  (r) = Recorded By, (t) = Taken By, (c) = Cosigned By      Initials Name Provider Type    BC Maryellen Mauricio OT Occupational Therapist                     Mobility/ADL's       Row Name 02/20/25 1309          Bed Mobility    Bed Mobility supine-sit  -BC     Supine-Sit [place] (Bed Mobility) minimum assist (75% patient effort)  -BC     Bed Mobility, Safety Issues decreased use of legs for bridging/pushing  -BC     Assistive Device (Bed Mobility) bed rails;head of bed elevated;repositioning sheet  -BC     Comment, (Bed Mobility) Min A for some help w/ trunk to sit up  -BC       Row Name 02/20/25 1309          Transfers    Transfers sit-stand transfer;stand-sit transfer;bed-chair transfer  -BC       Row Name 02/20/25 1309          Bed-Chair Transfer    Bed-Chair [place] (Transfers)  minimum assist (75% patient effort);1 person assist;verbal cues;nonverbal cues (demo/gesture)  -BC     Assistive Device (Bed-Chair Transfers) walker, front-wheeled  -BC     Comment, (Bed-Chair Transfer) forward flexed posture, utilized FWW to take steps forward and chair brought in behind Pt w cues for safety  -BC       Row Name 02/20/25 1309          Sit-Stand Transfer    Sit-Stand [place] (Transfers) minimum assist (75% patient effort);contact guard;verbal cues;nonverbal cues (demo/gesture)  -BC     Assistive Device (Sit-Stand Transfers) walker, front-wheeled  -BC     Comment, (Sit-Stand Transfer) CGA/min A x2 stands from EOB, increased time, difficulty w/ progressing UES to walker, but was able to perform w/ cues and static standing before returning to sitting, cont'd w/ chair follow  -BC       Row Name 02/20/25 1309          Stand-Sit Transfer    Stand-Sit [place] (Transfers) contact guard  -BC     Assistive Device (Stand-Sit Transfers) walker, front-wheeled  -BC       Row Name 02/20/25 1309          Functional Mobility    Functional Mobility- Ind. Level verbal cues required;contact guard assist  -BC     Functional Mobility- Comment Pt was able to perform fxnl mobility ~6-10 feet twice, increased time and difficulty w/ DF of ankles w/ walker mgmt  -BC     Patient was able to Ambulate yes  -BC       Row Name 02/20/25 1309          Activities of Daily Living    BADL Assessment/Intervention grooming  -BC       Row Name 02/20/25 1309          Lower Body Dressing Assessment/Training    [place] Level (Lower Body Dressing) doff;don;socks;dependent (less than 25% patient effort);shoes/slippers  -BC     Comment, (Lower Body Dressing) dep A for sock/slip on shoes EOB  -BC       Row Name 02/20/25 1309          Grooming Assessment/Training    [place] Level (Grooming) wash face, hands;oral care regimen;minimum assist (75% patient effort)  -BC     Position (Grooming) edge of bed sitting  -BC     Comment,  (Grooming) EOB sitting grooming tasks. Pt required assist to clean dentures, otherwise sup A.  -BC       Row Name 02/20/25 1309          Toileting Assessment/Training    [place] Level (Toileting) dependent (less than 25% patient effort)  -BC     Comment, (Toileting) purewick/brief  -BC               User Key  (r) = Recorded By, (t) = Taken By, (c) = Cosigned By      Initials Name Provider Type    Maryellen Gomes OT Occupational Therapist                   Obj/Interventions       Row Name 02/20/25 1316          Balance    Balance Assessment sitting static balance  -BC     Static Sitting Balance standby assist;supervision  -BC     Dynamic Sitting Balance supervision  -BC     Position, Sitting Balance sitting edge of bed  -BC     Static Standing Balance minimal assist;moderate assist  -BC     Position/Device Used, Standing Balance walker, front-wheeled  -BC     Balance Interventions standing  -BC               User Key  (r) = Recorded By, (t) = Taken By, (c) = Cosigned By      Initials Name Provider Type    Maryellen Gomes, OT Occupational Therapist                   Goals/Plan    No documentation.                  Clinical Impression       Row Name 02/20/25 1317          Pain Assessment    Pretreatment Pain Rating 0/10 - no pain  -BC     Posttreatment Pain Rating 0/10 - no pain  -BC       Row Name 02/20/25 1317          Plan of Care Review    Plan of Care Reviewed With patient  -BC     Progress improving  -BC     Outcome Evaluation Pt tolerated OT session fair. WIth increased time she performed bed mobility, and sat up EOB unsupported for grooming/ADL tasks and unsupported sitting tolerance. No LOB. Cont'd w/ standing w/ FWW w/ mod cues and min A for CTS 1st time, 3 attempts initially and unsuccesful. Improved w/ Pt able to progress w/ small steps w FWW and CGA. Backwards steps to EOB. Cont'd w/ fxnl mobility 2nd time w CGA w FWW and transitioned to chair. Pts son present in room and Pt/son discuessed  future POC, and he voiced frustrations with Pt as she only stayed in a w/c while at [facility] and didn't get up/walk, therefore weaker. Continue w POC and DC REC SNU.  -BC       Row Name 02/20/25 1317          Therapy Assessment/Plan (OT)    Rehab Potential (OT) good  -BC     Criteria for Skilled Therapeutic Interventions Met (OT) yes;skilled treatment is necessary  -BC       Row Name 02/20/25 1317          Therapy Plan Review/Discharge Plan (OT)    Anticipated Discharge Disposition (OT) [facility] nursing [facility]  -BC       Row Name 02/20/25 1317          Vital Signs    O2 Delivery Pre Treatment room air  -BC     O2 Delivery Intra Treatment room air  -BC     O2 Delivery Post Treatment room air  -BC     Pre Patient Position Supine  -BC     Intra Patient Position Standing  -BC     Post Patient Position Sitting  -BC       Row Name 02/20/25 1317          Positioning and Restraints    Pre-Treatment Position in bed  -BC     Post Treatment Position chair  -BC     In Chair notified nsg;reclined;sitting;call light within reach;encouraged to call for assist;exit alarm on;with family/caregiver  -BC               User Key  (r) = Recorded By, (t) = Taken By, (c) = Cosigned By      Initials Name Provider Type    BC Maryellen Mauricio, OT Occupational Therapist                   Outcome Measures       Row Name 02/20/25 1321          How much help from another is currently needed...    Putting on and taking off regular lower body clothing? 1  -BC     Bathing (including washing, rinsing, and drying) 2  -BC     Toileting (which includes using toilet bed pan or urinal) 1  -BC     Putting on and taking off regular upper body clothing 2  -BC     Taking care of personal grooming (such as brushing teeth) 3  -BC     Eating meals 4  -BC     AM-PAC 6 Clicks Score (OT) 13  -BC       Row Name 02/20/25 0872          How much help from another person do you currently need...    Turning from your back to your side while in flat bed without using bedrails?  3  -RP     Moving from lying on back to sitting on the side of a flat bed without bedrails? 3  -RP     Moving to and from a bed to a chair (including a wheelchair)? 3  -RP     Standing up from a chair using your arms (e.g., wheelchair, bedside chair)? 2  -RP     Climbing 3-5 steps with a railing? 2  -RP     To walk in hospital room? 2  -RP     AM-PAC 6 Clicks Score (PT) 15  -RP       Row Name 02/20/25 1321          Functional Assessment    Outcome Measure Options AM-PAC 6 Clicks Daily Activity (OT)  -BC               User Key  (r) = Recorded By, (t) = Taken By, (c) = Cosigned By      Initials Name Provider Type    Courtney Estrada LPN Licensed Nurse    Maryellen Gomes OT Occupational Therapist                    Occupational Therapy Education       Title: PT OT SLP Therapies (In Progress)       Topic: Occupational Therapy (In Progress)       Point: ADL training (Done)       Description:   Instruct learner(s) on proper safety adaptation and remediation techniques during self care or transfers.   Instruct in proper use of assistive devices.                  Learning Progress Summary            Patient Acceptance, E, VU by PP at 2/18/2025 1014    Comment: Pt Ed on OT role and dc planning                      Point: Home exercise program (Not Started)       Description:   Instruct learner(s) on appropriate technique for monitoring, assisting and/or progressing therapeutic exercises/activities.                  Learner Progress:  Not documented in this visit.              Point: Precautions (Not Started)       Description:   Instruct learner(s) on prescribed precautions during self-care and functional transfers.                  Learner Progress:  Not documented in this visit.              Point: Body mechanics (Not Started)       Description:   Instruct learner(s) on proper positioning and spine alignment during self-care, functional mobility activities and/or exercises.                  Learner Progress:   Not documented in this visit.                              User Key       Initials Effective Dates Name Provider Type Discipline    PP 06/09/23 -  Jodee East OT Occupational Therapist OT                  OT Recommendation and Plan     Plan of Care Review  Plan of Care Reviewed With: patient  Progress: improving  Outcome Evaluation: Pt tolerated OT session fair. WIth increased time she performed bed mobility, and sat up EOB unsupported for grooming/ADL tasks and unsupported sitting tolerance. No LOB. Cont'd w/ standing w/ FWW w/ mod cues and min A for CTS 1st time, 3 attempts initially and unsuccesful. Improved w/ Pt able to progress w/ small steps w FWW and CGA. Backwards steps to EOB. Cont'd w/ fxnl mobility 2nd time w CGA w FWW and transitioned to chair. Pts son present in room and Pt/son discuessed future POC, and he voiced frustrations with Pt as she only stayed in a w/c while at [place] and didn't get up/walk, therefore weaker. Continue w POC and DC REC SNU.     Time Calculation:         Time Calculation- OT       Row Name 02/20/25 1322             Time Calculation- OT    OT Start Time 1015  -BC      OT Stop Time 1055  -BC      OT Time Calculation (min) 40 min  -BC      Total Timed Code Minutes- OT 40 minute(s)  -BC      OT Received On 02/20/25  -BC      OT - Next Appointment 02/21/25  -BC         Timed Charges    15992 - OT Therapeutic Activity Minutes 25  -BC      46980 - OT Self Care/Mgmt Minutes 15  -BC         Total Minutes    Timed Charges Total Minutes 40  -BC       Total Minutes 40  -BC                User Key  (r) = Recorded By, (t) = Taken By, (c) = Cosigned By      Initials Name Provider Type    BC Maryellen Mauricio OT Occupational Therapist                  Therapy Charges for Today       Code Description Service Date Service Provider Modifiers Qty    87885475730 HC OT THERAPEUTIC ACT EA 15 MIN 2/20/2025 Maryellen Mauricio OT GO 2    74407693820 HC OT SELF CARE/MGMT/TRAIN EA 15 MIN 2/20/2025  Maryellen Mauricio, OT GO 1                 Maryellen Mauricio OT  2/20/2025

## 2025-02-20 NOTE — PLAN OF CARE
Problem: Adult Inpatient Plan of Care  Goal: Plan of Care Review  Outcome: Progressing  Flowsheets (Taken 2/20/2025 0538)  Plan of Care Reviewed With: patient  Goal: Patient-Specific Goal (Individualized)  Outcome: Progressing  Goal: Absence of Hospital-Acquired Illness or Injury  Outcome: Progressing  Intervention: Identify and Manage Fall Risk  Recent Flowsheet Documentation  Taken 2/20/2025 0438 by Kelsey Henry, RN  Safety Promotion/Fall Prevention:   activity supervised   clutter free environment maintained   fall prevention program maintained   nonskid shoes/slippers when out of bed   room organization consistent   safety round/check completed  Taken 2/20/2025 0230 by Kelsey Henry, RN  Safety Promotion/Fall Prevention:   activity supervised   clutter free environment maintained   fall prevention program maintained   nonskid shoes/slippers when out of bed   room organization consistent   safety round/check completed  Taken 2/20/2025 0046 by Kelsey Henry, RN  Safety Promotion/Fall Prevention:   activity supervised   assistive device/personal items within reach   clutter free environment maintained   fall prevention program maintained   nonskid shoes/slippers when out of bed   safety round/check completed   room organization consistent   toileting scheduled  Taken 2/19/2025 2212 by Kelsey Henry, RN  Safety Promotion/Fall Prevention:   activity supervised   clutter free environment maintained   fall prevention program maintained   nonskid shoes/slippers when out of bed   room organization consistent   safety round/check completed  Taken 2/19/2025 2030 by Kelsey Henry, RN  Safety Promotion/Fall Prevention:   activity supervised   fall prevention program maintained   assistive device/personal items within reach   clutter free environment maintained   nonskid shoes/slippers when out of bed   safety round/check completed  Intervention: Prevent Skin Injury  Recent Flowsheet  Documentation  Taken 2/20/2025 0046 by Kelsey Henry RN  Body Position: position changed independently  Skin Protection: incontinence pads utilized  Taken 2/19/2025 2030 by Kelsey Henry RN  Body Position: position changed independently  Intervention: Prevent and Manage VTE (Venous Thromboembolism) Risk  Recent Flowsheet Documentation  Taken 2/20/2025 0046 by Kelsey Henry RN  VTE Prevention/Management: (pt  on heparin)   patient refused intervention   other (see comments)  Taken 2/19/2025 2030 by Kelsey Henry RN  VTE Prevention/Management: (pt on heparin)   patient refused intervention   SCDs (sequential compression devices) off  Intervention: Prevent Infection  Recent Flowsheet Documentation  Taken 2/20/2025 0438 by Kelsey Henry RN  Infection Prevention:   rest/sleep promoted   hand hygiene promoted   single patient room provided  Taken 2/20/2025 0230 by Kelsey Henry RN  Infection Prevention:   rest/sleep promoted   hand hygiene promoted   single patient room provided  Taken 2/20/2025 0046 by Kelsey Henry RN  Infection Prevention:   hand hygiene promoted   single patient room provided   rest/sleep promoted  Taken 2/19/2025 2212 by Kelsey Henry RN  Infection Prevention:   rest/sleep promoted   hand hygiene promoted   single patient room provided  Taken 2/19/2025 2030 by Kelsey Henry RN  Infection Prevention:   hand hygiene promoted   rest/sleep promoted   single patient room provided  Goal: Optimal Comfort and Wellbeing  Outcome: Progressing  Intervention: Provide Person-Centered Care  Recent Flowsheet Documentation  Taken 2/20/2025 0046 by Kelsey Henry RN  Trust Relationship/Rapport: care explained  Taken 2/19/2025 2030 by Kelsey Henry RN  Trust Relationship/Rapport: care explained  Goal: Readiness for Transition of Care  Outcome: Progressing     Problem: Comorbidity Management  Goal: Blood Pressure in Desired Range  Outcome:  Progressing  Intervention: Maintain Blood Pressure Management  Recent Flowsheet Documentation  Taken 2/20/2025 0046 by Kelsey Henry, RN  Medication Review/Management: medications reviewed     Problem: Skin Injury Risk Increased  Goal: Skin Health and Integrity  Outcome: Progressing  Intervention: Optimize Skin Protection  Recent Flowsheet Documentation  Taken 2/20/2025 0046 by Kelsey Henry, RN  Activity Management: activity encouraged  Pressure Reduction Techniques: frequent weight shift encouraged  Head of Bed (HOB) Positioning: HOB at 20-30 degrees  Pressure Reduction Devices: pressure-redistributing mattress utilized  Skin Protection: incontinence pads utilized  Taken 2/19/2025 2030 by Kelsey Henry, RN  Activity Management: activity encouraged  Head of Bed (HOB) Positioning: HOB elevated     Problem: Fall Injury Risk  Goal: Absence of Fall and Fall-Related Injury  Outcome: Progressing  Intervention: Identify and Manage Contributors  Recent Flowsheet Documentation  Taken 2/20/2025 0046 by Kelsey Henry, RN  Medication Review/Management: medications reviewed  Intervention: Promote Injury-Free Environment  Recent Flowsheet Documentation  Taken 2/20/2025 0438 by Kelsey Henry, RN  Safety Promotion/Fall Prevention:   activity supervised   clutter free environment maintained   fall prevention program maintained   nonskid shoes/slippers when out of bed   room organization consistent   safety round/check completed  Taken 2/20/2025 0230 by Kelsey Henry, RN  Safety Promotion/Fall Prevention:   activity supervised   clutter free environment maintained   fall prevention program maintained   nonskid shoes/slippers when out of bed   room organization consistent   safety round/check completed  Taken 2/20/2025 0046 by Kelsey Henry, RN  Safety Promotion/Fall Prevention:   activity supervised   assistive device/personal items within reach   clutter free environment maintained   fall  prevention program maintained   nonskid shoes/slippers when out of bed   safety round/check completed   room organization consistent   toileting scheduled  Taken 2/19/2025 2212 by Kelsey Henry, RN  Safety Promotion/Fall Prevention:   activity supervised   clutter free environment maintained   fall prevention program maintained   nonskid shoes/slippers when out of bed   room organization consistent   safety round/check completed  Taken 2/19/2025 2030 by Kelsey Henry, RN  Safety Promotion/Fall Prevention:   activity supervised   fall prevention program maintained   assistive device/personal items within reach   clutter free environment maintained   nonskid shoes/slippers when out of bed   safety round/check completed   Goal Outcome Evaluation:  Plan of Care Reviewed With: patient        Progress: no change

## 2025-02-20 NOTE — PROGRESS NOTES
Name: Halie Guidry ADMIT: 2025   : 1940  PCP: Napoleon Mead MD    MRN: [id number] LOS: 0 days   AGE/SEX: 84 y.o. female  ROOM: [facility]     Subjective   Subjective   She is working with PT/OT. She remains very weak, She is very upset that she has been to rehab recently, and has learned she will not be abl to return to her assisted living facility. Family is at bedside, discussed need for additional physical therapy for reconditioning, and strengthening.  She reports her last bowel movement was on Monday, she otherwise offers no other complaints at this time.    Objective   Objective   Vital Signs  Temp:  [97.5 °F (36.4 °C)-98.2 °F (36.8 °C)] 97.5 °F (36.4 °C)  Heart Rate:  [60-72] 66  Resp:  [16] 16  BP: (135-169)/(45-63) 158/45  SpO2:  [90 %-96 %] 96 %  on   ;   Device (Oxygen Therapy): room air  Body mass index is 32.65 kg/m².  Physical Exam  Vitals and nursing note reviewed.   Constitutional:       Appearance: She is obese.   HENT:      Head: Atraumatic.      Mouth/Throat:      Mouth: Mucous membranes are dry.   Eyes:      Conjunctiva/sclera: Conjunctivae normal.   Cardiovascular:      Rate and Rhythm: Normal rate and regular rhythm.      Pulses: Normal pulses.      Heart sounds: Normal heart sounds.   Pulmonary:      Effort: Pulmonary effort is normal.      Breath sounds: Normal breath sounds.   Abdominal:      General: Bowel sounds are normal.      Palpations: Abdomen is soft.   Skin:     General: Skin is warm and dry.      Capillary Refill: Capillary refill takes less than 2 seconds.      Findings: Erythema and wound present.      Comments: Right lateral/ posterior lower leg-dry Mepilex in place  Dry flaky skin    Neurological:      General: No focal deficit present.      Mental Status: She is alert. Mental status is at baseline.   Psychiatric:         Mood and Affect: Affect is angry.       Results Review     I reviewed the patient's new clinical results.  Results from last 7 days    Lab Units 02/20/25  0421 02/19/25  0538 02/17/25  1404   WBC 10*3/mm3 7.42 8.78 13.00*   HEMOGLOBIN g/dL 8.6* 8.5* 9.6*   PLATELETS 10*3/mm3 294 293 303     Results from last 7 days   Lab Units 02/20/25  0421 02/19/25  0538 02/18/25  0557 02/17/25  1404   SODIUM mmol/L 141 143 142 141   POTASSIUM mmol/L 3.7 3.8 3.9 4.1   CHLORIDE mmol/L 106 107 107 106   CO2 mmol/L 20.0* 21.0* 22.0 23.0   BUN mg/dL 49* 47* 45* 50*   CREATININE mg/dL 2.61* 2.27* 2.06* 2.28*   GLUCOSE mg/dL 146* 121* 94 125*   EGFR mL/min/1.73 17.6* 20.8* 23.4* 20.7*     Results from last 7 days   Lab Units 02/17/25  1404   ALBUMIN g/dL 3.7   BILIRUBIN mg/dL 0.3   ALK PHOS U/L 165*   AST (SGOT) U/L 11   ALT (SGPT) U/L 7     Results from last 7 days   Lab Units 02/20/25  0421 02/19/25  0538 02/18/25  0557 02/17/25  1404   CALCIUM mg/dL 8.5* 8.7 9.0 9.1   ALBUMIN g/dL  --   --   --  3.7   MAGNESIUM mg/dL  --   --   --  2.1     Results from last 7 days   Lab Units 02/17/25  1639 02/17/25  1404   PROCALCITONIN ng/mL  --  0.09   LACTATE mmol/L 1.3  --      Glucose   Date/Time Value Ref Range Status   02/20/2025 1101 181 (H) 70 - 130 mg/dL Final   02/20/2025 0617 144 (H) 70 - 130 mg/dL Final   02/19/2025 2035 169 (H) 70 - 130 mg/dL Final   02/19/2025 1544 118 70 - 130 mg/dL Final   02/19/2025 1119 186 (H) 70 - 130 mg/dL Final   02/19/2025 0641 127 70 - 130 mg/dL Final   02/18/2025 2112 195 (H) 70 - 130 mg/dL Final       No radiology results for the last day    I have personally reviewed all medications:  Scheduled Medications  amLODIPine, 5 mg, Oral, Q24H  atorvastatin, 80 mg, Oral, Nightly  bisoprolol, 5 mg, Oral, Daily  DULoxetine, 30 mg, Oral, Daily  ferrous sulfate, 325 mg, Oral, Every Other Day  fluticasone, 2 spray, Nasal, Daily  heparin (porcine), 5,000 Units, Subcutaneous, Q12H  hydrALAZINE, 50 mg, Oral, Q8H  insulin glargine, 72 Units, Subcutaneous, Daily  insulin lispro, 4-24 Units, Subcutaneous, 4x Daily AC & at Bedtime  levothyroxine, 150 mcg,  Oral, Q AM  pantoprazole, 40 mg, Oral, Q AM  pregabalin, 75 mg, Oral, BID  sodium chloride, 10 mL, Intravenous, Q12H  torsemide, 40 mg, Oral, Daily    Infusions   Diet  Diet: Regular/House, Cardiac, Diabetic; Healthy Heart (2-3 Na+); Consistent Carbohydrate; Fluid Consistency: Thin (IDDSI 0)    I have personally reviewed:  [x]  Laboratory   [x]  Microbiology   [x]  Radiology   [x]  EKG/Telemetry  [x]  Cardiology/Vascular   []  Pathology    []  Records       Assessment/Plan     Active Hospital Problems    Diagnosis  POA    **Hypertensive urgency [I16.0]  Yes    HTN (hypertension) [I10]  Yes    CKD (chronic kidney disease) stage 4, GFR 15-29 ml/min [N18.4]  Yes    Type 2 diabetes mellitus with hyperglycemia, with long-term current use of insulin [E11.65, Z79.4]  Not Applicable    Primary hypothyroidism [E03.9]  Yes      Resolved Hospital Problems   No resolved problems to display.       84 y.o. female admitted with Hypertensive urgency.    Hypertensive urgency-resolved  Essential hypertensive   BP acceptable off Cardene drip was slightly elevated this am - responded well to her home medications.   Continue bisoprolol, amlodipine, hydralazine, torsemide    Type 2 diabetes mellitus with hyperglycemia, with long-term current use of insulin  Chronic   Her blood glucose is elevated but acceptable   SSI for hyperglycemia   Hypoglycemia treatment per protocol     CKD (chronic kidney disease) stage 4, GFR 15-29 ml/min  Anemia of chronic disease   Chronic   Crt levels fluctuating  Continue home torsemide   Dose, daily weights  Avoid nephrotoxins, hypovolemia, hypotension    Primary Hypothyroidism   Chronic   She had noted elevated TSH above 8   Levothyroxine was increased to 150 mcg  She will need repeat thyroid panel in 4-6 weeks  Leukocytosis   Has resolved    Bilateral lower extremity edema, history of recent cellulitis, with right lower extremity wound  Care following appreciate their assistance and recommendations  Wound  culture history of VRE, MDRO, ESBL Klebsiella  She remains in contact isolation     SCDs for DVT prophylaxis.  Full code.  Discussed with patient, nursing staff, and care team on multidisciplinary rounds.  Anticipate discharge to SNU facility once arrangements have been made.  Expected Discharge Date: 2/21/2025; Expected Discharge Time:       SERGE Medina  [facility]  02/20/25  11:04 EST

## 2025-02-20 NOTE — CASE MANAGEMENT/SOCIAL WORK
Continued Stay Note  [facility]     Patient Name: Halie Guidry  MRN: [id number]  Today's Date: 2/20/2025    Admit Date: 2/17/2025    Plan: [facility] pending precert   Discharge Plan       Row Name 02/20/25 1112       Plan    Plan [facility] pending precert    Plan Comments Precert is still pending for [facility]. CCP to follow.                   Discharge Codes    No documentation.                 Expected Discharge Date and Time       Expected Discharge Date Expected Discharge Time    Feb 21, 2025               ANA MARIA Dixon

## 2025-02-20 NOTE — CASE MANAGEMENT/SOCIAL WORK
Post-Acute Authorization Submission      Post Acute Pre-Cert Documentation  Request Submitted by Facility - Type:: Hospital  Post-Acute Authorization Type Submitted:: SNF  Date Post Acute Pre-Cert Inititated per Facility: 02/19/25  Date Post Acute Pre-Cert Completed: 02/20/25  Accepting Facility: [facility]  Hospital Discharge Date Requested: 02/20/25  All Clinicals Submitted?: Yes  Had Accepting Facility at Time of Submission: Yes  Response Received from Insurance?: Approval  Response Communicated to:: , Accepting Facility Liaison, Accepting Facility Auth Department  Authorization Number:: APPROVED [id number]  Post Acute Pre-Cert Initiated Comment: VALID TO ADMIT UPTO 2/25/25.              Matt Moran, PCT

## 2025-02-20 NOTE — PLAN OF CARE
Goal Outcome Evaluation:      Pt a/ox4 no c/o of pain or discomfort pt has wound on right lower leg dressing change was administer as order no redness or discharge from wound pt up in chair most of day back in bed with call light in reach no other concerns as present plan of care on going

## 2025-02-20 NOTE — PLAN OF CARE
Goal Outcome Evaluation:  Plan of Care Reviewed With: patient        Progress: improving  Outcome Evaluation: Pt tolerated OT session fair. WIth increased time she performed bed mobility, and sat up EOB unsupported for grooming/ADL tasks and unsupported sitting tolerance. No LOB. Cont'd w/ standing w/ FWW w/ mod cues and min A for CTS 1st time, 3 attempts initially and unsuccesful. Improved w/ Pt able to progress w/ small steps w FWW and CGA. Backwards steps to EOB. Cont'd w/ fxnl mobility 2nd time w CGA w FWW and transitioned to chair. Pts son present in room and Pt/son discuessed future POC, and he voiced frustrations with Pt as she only stayed in a w/c while at [facility] and didn't get up/walk, therefore weaker. Continue w POC and DC REC SNU.    Anticipated Discharge Disposition (OT): skilled nursing facility

## 2025-02-21 VITALS
HEART RATE: 60 BPM | HEIGHT: 68 IN | RESPIRATION RATE: 16 BRPM | TEMPERATURE: 97.9 F | BODY MASS INDEX: 32.54 KG/M2 | OXYGEN SATURATION: 99 % | SYSTOLIC BLOOD PRESSURE: 151 MMHG | WEIGHT: 214.73 LBS | DIASTOLIC BLOOD PRESSURE: 48 MMHG

## 2025-02-21 LAB
GLUCOSE BLDC GLUCOMTR-MCNC: 109 MG/DL (ref 70–130)
GLUCOSE BLDC GLUCOMTR-MCNC: 84 MG/DL (ref 70–130)

## 2025-02-21 PROCEDURE — G0378 HOSPITAL OBSERVATION PER HR: HCPCS

## 2025-02-21 PROCEDURE — 97110 THERAPEUTIC EXERCISES: CPT

## 2025-02-21 PROCEDURE — 97530 THERAPEUTIC ACTIVITIES: CPT

## 2025-02-21 PROCEDURE — 25010000002 HEPARIN (PORCINE) PER 1000 UNITS: Performed by: HOSPITALIST

## 2025-02-21 PROCEDURE — 82948 REAGENT STRIP/BLOOD GLUCOSE: CPT

## 2025-02-21 PROCEDURE — 63710000001 INSULIN GLARGINE PER 5 UNITS: Performed by: NURSE PRACTITIONER

## 2025-02-21 PROCEDURE — 96372 THER/PROPH/DIAG INJ SC/IM: CPT

## 2025-02-21 RX ORDER — HYDRALAZINE HYDROCHLORIDE 50 MG/1
50 TABLET, FILM COATED ORAL EVERY 8 HOURS SCHEDULED
Qty: 30 TABLET | Refills: 0 | Status: SHIPPED | OUTPATIENT
Start: 2025-02-21

## 2025-02-21 RX ORDER — AMLODIPINE BESYLATE 5 MG/1
5 TABLET ORAL
Qty: 30 TABLET | Refills: 0 | Status: SHIPPED | OUTPATIENT
Start: 2025-02-22

## 2025-02-21 RX ADMIN — LEVOTHYROXINE SODIUM 150 MCG: 0.07 TABLET ORAL at 05:30

## 2025-02-21 RX ADMIN — Medication 10 ML: at 08:55

## 2025-02-21 RX ADMIN — HYDRALAZINE HYDROCHLORIDE 50 MG: 50 TABLET ORAL at 14:16

## 2025-02-21 RX ADMIN — DULOXETINE HYDROCHLORIDE 30 MG: 30 CAPSULE, DELAYED RELEASE ORAL at 08:55

## 2025-02-21 RX ADMIN — AMLODIPINE BESYLATE 5 MG: 5 TABLET ORAL at 08:55

## 2025-02-21 RX ADMIN — FLUTICASONE PROPIONATE 2 SPRAY: 50 SPRAY, METERED NASAL at 08:56

## 2025-02-21 RX ADMIN — PANTOPRAZOLE SODIUM 40 MG: 40 TABLET, DELAYED RELEASE ORAL at 05:30

## 2025-02-21 RX ADMIN — BISOPROLOL FUMARATE 5 MG: 5 TABLET ORAL at 08:54

## 2025-02-21 RX ADMIN — HEPARIN SODIUM 5000 UNITS: 5000 INJECTION INTRAVENOUS; SUBCUTANEOUS at 08:55

## 2025-02-21 RX ADMIN — INSULIN GLARGINE 72 UNITS: 100 INJECTION, SOLUTION SUBCUTANEOUS at 08:53

## 2025-02-21 RX ADMIN — PREGABALIN 75 MG: 50 CAPSULE ORAL at 08:54

## 2025-02-21 RX ADMIN — TORSEMIDE 40 MG: 20 TABLET ORAL at 08:54

## 2025-02-21 RX ADMIN — FERROUS SULFATE TAB 325 MG (65 MG ELEMENTAL FE) 325 MG: 325 (65 FE) TAB at 08:54

## 2025-02-21 RX ADMIN — HYDRALAZINE HYDROCHLORIDE 50 MG: 50 TABLET ORAL at 05:30

## 2025-02-21 RX ADMIN — ACETAMINOPHEN 650 MG: 325 TABLET, FILM COATED ORAL at 09:04

## 2025-02-21 NOTE — DISCHARGE SUMMARY
Patient Name: Halie Guidry  : 1940  MRN: [id number]    Date of Admission: 2025  Date of Discharge:  2025  Primary Care Physician: Napoleon Mead MD      Chief Complaint:   Diarrhea and Weakness - Generalized      Discharge Diagnoses     Active Hospital Problems    Diagnosis  POA    **Hypertensive urgency [I16.0]  Yes    HTN (hypertension) [I10]  Yes    CKD (chronic kidney disease) stage 4, GFR 15-29 ml/min [N18.4]  Yes    Type 2 diabetes mellitus with hyperglycemia, with long-term current use of insulin [E11.65, Z79.4]  Not Applicable    Primary hypothyroidism [E03.9]  Yes      Resolved Hospital Problems   No resolved problems to display.        Hospital Course     Ms. Guidry is a 84 y.o. female with a history of HTN, type 2 diabetes mellitus, CKD, hypothyroidism, tobacco abuse, spinal stenosis, SAMI, pancreatic neoplasm, obesity, history of C. difficile, history of seizures, orthostatic hypotension,non compliance and recent hospitalization, and rehabilitation stay for physical therapy who presented to [facility] initially from her assisted living facility with a hypertensive urgency .  Please see the admitting history and physical for further details.  She was found to have significantly elevated blood pressure requiring Cardene drip and was admitted to the hospital for further evaluation and treatment.    Ms. Guidry was rather quickly weaned off the Cardene drip, and restarted on her home medications, and additional Norvasc and hydralazine. Her blood pressure has been well controlled since. It is suspected she was not taking her medications properly, as she does not have personal care at her facility according to her family. She has tolerated medication changes well.  She has been evaluated by wound care nurse during her admission, she has had recent lower extremity cellulitis with a healed wound on her right lower extremity. No wound care has been indicated. Area  has been covered with non adherent dressing for protection.   It is suspected that Ms Guidry has had on going decline for an extended period of time and is reported to have not been able to ambulate our get herself from a seated to standing position once she returned to her independent living facility. Unfortunately she is physical unable to meet requirements to return to her facility at this time and needs alternative placement. She does  continues to have difficulty with her mobility, but has improved after working with physical therapy during this hospitalization. She was able to ambulate 20ft with a walker during therapy today.   She is medically stable for discharged to skilled nursing facility today.   She has been advised she should take all her medications as they have been prescribed, and to attend all follow-up appointments.  She has also been told to return to the emergency room if she was to experience any new or worsening symptoms.     Day of Discharge     Subjective:  She is very eager to be discharged to skilled nursing facility today and hopes that she will be able to continue rehab and increase her strength, and mobility. She offers no complaints at this time.     Physical Exam:  Temp:  [97.3 °F (36.3 °C)-98.1 °F (36.7 °C)] 97.3 °F (36.3 °C)  Heart Rate:  [64-67] 64  Resp:  [16] 16  BP: (148-170)/(43-63) 160/63  Body mass index is 32.65 kg/m².  Physical Exam  Vitals and nursing note reviewed.   Constitutional:       General: She is not in acute distress.     Appearance: Normal appearance. She is obese.   HENT:      Head: Normocephalic and atraumatic.      Mouth/Throat:      Mouth: Mucous membranes are moist. Mucous membranes are dry.      Pharynx: No posterior oropharyngeal erythema.   Eyes:      General: No scleral icterus.     Conjunctiva/sclera: Conjunctivae normal.   Neck:      Vascular: No JVD.   Cardiovascular:      Rate and Rhythm: Normal rate and regular rhythm.      Pulses: Normal pulses.       Heart sounds: Normal heart sounds. No murmur heard.  Pulmonary:      Effort: Pulmonary effort is normal. No respiratory distress.      Breath sounds: Normal breath sounds. Examination of the right-lower field reveals decreased breath sounds.   Abdominal:      General: Bowel sounds are normal. There is no distension.      Palpations: Abdomen is soft.      Tenderness: There is no abdominal tenderness.   Musculoskeletal:         General: No swelling or tenderness. Normal range of motion.      Cervical back: Neck supple.      Right lower leg: Edema present.      Left lower leg: Edema present.   Skin:     General: Skin is warm and dry.      Capillary Refill: Capillary refill takes less than 2 seconds.      Coloration: Skin is not jaundiced.      Findings: Erythema present. No rash.      Comments: Healing wound right lateral lower extremity    Neurological:      General: No focal deficit present.      Mental Status: She is alert and oriented to person, place, and time. Mental status is at baseline.   Psychiatric:         Mood and Affect: Mood normal.         Behavior: Behavior normal.         Consultants     Consult Orders (all) (From admission, onward)       Start     Ordered    02/18/25 1630  Inpatient Case Management  Consult  Once        Provider:  (Not yet assigned)    02/18/25 1630    02/18/25 1537  Inpatient Diabetes Educator Consult  Once,   Status:  Canceled        Provider:  (Not yet assigned)    02/18/25 1536    02/17/25 1915  [facility] (on-call MD unless specified) Details  Once        Specialty:  Hospitalist  Provider:  (Not yet assigned)    02/17/25 1914                  Procedures     * Surgery not found *    Imaging Results (All)       None          Results for orders placed during the hospital encounter of 10/10/24    Duplex Venous Lower Extremity - Bilateral CAR    Interpretation Summary    Normal bilateral lower extremity venous duplex scan.    Results for orders placed during the hospital  encounter of 08/24/24    Adult Transthoracic Echo Complete W/ Cont if Necessary Per Protocol    Interpretation Summary    Left ventricular systolic function is normal. Calculated left ventricular EF = 61.8% Normal left ventricular cavity size noted. Left ventricular wall thickness is consistent with moderate concentric hypertrophy. All left ventricular wall segments contract normally. Left ventricular diastolic function was normal.    The left atrial cavity is moderately dilated.    There is moderate calcification of the aortic valve. The aortic valve appears trileaflet. Trace aortic valve regurgitation is present. No aortic valve stenosis is present.    Moderate mitral annular calcification is present. Mild mitral valve regurgitation is present with multiple jets noted. No significant mitral valve stenosis is present.    Pertinent Labs     Results from last 7 days   Lab Units 02/20/25 0421 02/19/25  0538 02/17/25  1404   WBC 10*3/mm3 7.42 8.78 13.00*   HEMOGLOBIN g/dL 8.6* 8.5* 9.6*   PLATELETS 10*3/mm3 294 293 303     Results from last 7 days   Lab Units 02/20/25 0421 02/19/25 0538 02/18/25  0557 02/17/25  1404   SODIUM mmol/L 141 143 142 141   POTASSIUM mmol/L 3.7 3.8 3.9 4.1   CHLORIDE mmol/L 106 107 107 106   CO2 mmol/L 20.0* 21.0* 22.0 23.0   BUN mg/dL 49* 47* 45* 50*   CREATININE mg/dL 2.61* 2.27* 2.06* 2.28*   GLUCOSE mg/dL 146* 121* 94 125*   EGFR mL/min/1.73 17.6* 20.8* 23.4* 20.7*     Results from last 7 days   Lab Units 02/17/25  1404   ALBUMIN g/dL 3.7   BILIRUBIN mg/dL 0.3   ALK PHOS U/L 165*   AST (SGOT) U/L 11   ALT (SGPT) U/L 7     Results from last 7 days   Lab Units 02/20/25 0421 02/19/25 0538 02/18/25  0557 02/17/25  1404   CALCIUM mg/dL 8.5* 8.7 9.0 9.1   ALBUMIN g/dL  --   --   --  3.7   MAGNESIUM mg/dL  --   --   --  2.1     Results from last 7 days   Lab Units 02/17/25  1404   LIPASE U/L 46     Results from last 7 days   Lab Units 02/17/25  1404   CK TOTAL U/L 196*           Invalid  "input(s): \"LDLCALC\"  Results from last 7 days   Lab Units 02/17/25  1641 02/17/25  1639   BLOODCX  No growth at 3 days No growth at 3 days         Test Results Pending at Discharge     Pending Results       None              Discharge Details        Discharge Medications        Continue These Medications        Instructions Start Date   BD Pen Needle Naila 2nd Gen 32G X 4 MM misc  Generic drug: Insulin Pen Needle   Use 1 pen needle 4 (Four) Times a Day.             ASK your doctor about these medications        Instructions Start Date   acetaminophen 325 MG tablet  Commonly known as: TYLENOL   650 mg, Oral, Every 6 Hours PRN      ammonium lactate 12 % cream  Commonly known as: AMLACTIN   1 Application, Topical, 2 Times Daily      atorvastatin 80 MG tablet  Commonly known as: LIPITOR   80 mg, Oral, Nightly      bisoprolol 5 MG tablet  Commonly known as: ZEBeta   5 mg, Oral, Daily      DULoxetine 30 MG capsule  Commonly known as: CYMBALTA   30 mg, Oral, Daily      ferrous sulfate 325 (65 FE) MG tablet   325 mg, Oral, Every Other Day      fluticasone 50 MCG/ACT nasal spray  Commonly known as: FLONASE   2 sprays, Nasal, Daily      HumaLOG KwikPen 100 UNIT/ML solution pen-injector  Generic drug: Insulin Lispro (1 Unit Dial)   15-20 Units, Subcutaneous, 3 Times Daily Before Meals      hydrocortisone-bacitracin-zinc oxide-nystatin  Commonly known as: MAGIC BARRIER   1 Application, Topical, 2 Times Daily      lansoprazole 30 MG capsule  Commonly known as: PREVACID   30 mg, Oral, Daily      levothyroxine 137 MCG tablet  Commonly known as: SYNTHROID, LEVOTHROID   137 mcg, Oral, Every Early Morning      octreotide 30 MG injection  Commonly known as: sandoSTATIN   30 mg, Every 28 Days      pregabalin 75 MG capsule  Commonly known as: LYRICA   75 mg, Oral, 2 Times Daily      terazosin 1 MG capsule  Commonly known as: HYTRIN   1 mg, Oral, Nightly      torsemide 20 MG tablet  Commonly known as: DEMADEX   40 mg, Oral, Daily    "   Toujeo Max SoloStar 300 UNIT/ML solution pen-injector injection  Generic drug: Insulin Glargine (2 Unit Dial)   90 Units, Subcutaneous, Daily               Allergies   Allergen Reactions    Sulfa Antibiotics Unknown - High Severity     Pt says it was a long time ago and I don't remember but it wasn't good.        Discharge Disposition:  Skilled Nursing Facility (DC - External)      Discharge Diet:  Diet Order   Procedures    Diet: Regular/House, Cardiac, Diabetic; Healthy Heart (2-3 Na+); Consistent Carbohydrate; Fluid Consistency: Thin (IDDSI 0)       Discharge Activity: As tolerated - she is currently using a walker       CODE STATUS:    Code Status and Medical Interventions: No CPR (Do Not Attempt to Resuscitate); Limited Support; No intubation (DNI)   Ordered at: 02/20/25 1036     Medical Intervention Limits:    No intubation (DNI)     Code Status (Patient has no pulse and is not breathing):    No CPR (Do Not Attempt to Resuscitate)     Medical Interventions (Patient has pulse or is breathing):    Limited Support       Future Appointments   Date Time Provider Department Center   2/25/2025  1:30 PM [facility] CT 1 [facility]      Contact information for follow-up providers       Napoleon Mead MD Follow up in 1 week(s).    Specialty: Family Medicine  Why: 1-2 weeks for post hosptilization follow up  Contact information:  [address] [address] [address] [phone]                       Contact information for after-discharge care       Destination       [facility] .    Service: Skilled Nursing  Contact information:  [address] [facility] [address] [phone]                                   Time Spent on Discharge:  Greater than 30 minutes      SERGE Medina  [facility]  02/21/25  12:15 EST

## 2025-02-21 NOTE — PLAN OF CARE
Goal Outcome Evaluation:  Plan of Care Reviewed With: patient        Progress: improving  Outcome Evaluation: Pt agreeable and cooperative w skilled PT this AM. Pt agreeable to seated LE strengthening therex in chair, pt also ambulated 20ft CGA x 2 rwx (following w chair for safety). Pt denies pain. will continue to prepare for DC, anticipate SNU.    Anticipated Discharge Disposition (PT): skilled nursing facility

## 2025-02-21 NOTE — PLAN OF CARE
Problem: Adult Inpatient Plan of Care  Goal: Absence of Hospital-Acquired Illness or Injury  Intervention: Prevent Infection  Recent Flowsheet Documentation  Taken 2/21/2025 0208 by Zachary Orona, RN  Infection Prevention:   single patient room provided   hand hygiene promoted  Taken 2/21/2025 0015 by Zachary Orona, RN  Infection Prevention:   rest/sleep promoted   single patient room provided  Taken 2/20/2025 2217 by Zachary Orona, RN  Infection Prevention: rest/sleep promoted  Taken 2/20/2025 2015 by Zachary Orona, RN  Infection Prevention: single patient room provided   Goal Outcome Evaluation:

## 2025-02-21 NOTE — PROGRESS NOTES
Discharge Planning Assessment  [facility]     Patient Name: Halie Guidry  MRN: [id number]  Today's Date: 2/21/2025    Admit Date: 2/17/2025    Plan: [facility] acute skilled rehab   Discharge Needs Assessment    No documentation.                  Discharge Plan       Row Name 02/21/25 1549       Plan    Plan [facility] acute skilled rehab    Final Discharge Disposition Code 03 - skilled nursing facility (SNF)    Final Note skilled rehab at [facility] post acute. Spoke with patient at bedside she stated she will call her son and let him know she will be transported at 3PM via [employer] van                  Continued Care and Services - Discharged on 2/21/2025 Admission date: 2/17/2025 - Discharge disposition: Skilled Nursing Facility (DC - External)      Destination Coordination complete.      Service Provider Request Status Services Address Phone Fax Patient Preferred    [facility]  [facility] [address] [id number] [phone] --    [facility] Accepted -- [address] [id number] [phone] --    [facility] Accepted -- [address] [facility] [address] [phone] --    [facility] P[facility] - Request Sent -- [address] [address] [facility] [address] [phone] --    [facility] Pending - No Request Sent -- [address] [facility] [id number] [phone] --                  Selected Continued Care - Episodes Includes continued care and service providers with selected services from the active episodes listed below      [facility] Episode start date: 9/10/2024   There are no active outsourced providers for this episode.                 Selected Continued Care - Prior Encounters Includes continued care and  service providers with selected services from prior encounters from 11/19/2024 to 2/21/2025      Discharged on 1/21/2025 Admission date: 1/14/2025 - Discharge disposition: Skilled Nursing Facility (DC - External)      Destination       Service Provider Services Address Phone Fax Patient Preferred    [facility] [address], [facility] [address] [phone] --              Home Medical Care       Service Provider Services Address Phone Fax Patient Preferred    [facility] [address], [address], [facility] [address] [phone] --                          Expected Discharge Date and Time       Expected Discharge Date Expected Discharge Time    Feb 21, 2025            Demographic Summary    No documentation.                  Functional Status    No documentation.                  Psychosocial    No documentation.                  Abuse/Neglect    No documentation.                  Legal    No documentation.                  Substance Abuse    No documentation.                  Patient Forms    No documentation.                     Heidi Epstein RN

## 2025-02-21 NOTE — PLAN OF CARE
Goal Outcome Evaluation:  Plan of Care Reviewed With: patient        Progress: improving  Outcome Evaluation: Pt tolerated OT session well. Improved performance w/ LBD, initiation w mvmts to sitting up EOB unsupported and cont'd w/ pivot to chair w FWW and CGA to min A. Left up in chair w/ PT present to finish session for gait training following grooming tasks. Cont w POC and to rec SNU.    Anticipated Discharge Disposition (OT): skilled nursing facility

## 2025-02-21 NOTE — THERAPY TREATMENT NOTE
Patient Name: Halie Guidry  : 1940    MRN: [id number]                              Today's Date: 2025       Admit Date: 2025    Visit Dx:     ICD-10-CM ICD-9-CM   1. Accelerated hypertension  I10 401.0   2. General weakness  R53.1 780.79   3. Inability to walk  R26.2 719.7   4. Leukocytosis, unspecified type  D72.829 288.60   5. Anemia, unspecified type  D64.9 285.9   6. Chronic kidney disease, unspecified CKD stage  N18.9 585.9   7. Hyperglycemia  R73.9 790.29     Patient Active Problem List   Diagnosis    Compression fracture    Lumbar degenerative disc disease    Type 2 diabetes mellitus with hyperglycemia, with long-term current use of insulin    Hyperlipidemia    Benign essential hypertension    Primary hypothyroidism    Neuropathy    Osteoporosis    Proteinuria    Tobacco abuse    Generalized weakness    Spinal stenosis    Scoliosis    Arthritis    VBI (vertebrobasilar insufficiency)    Orthostatic hypotension    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    Severe hypothyroidism    Noncompliance with medication regimen    Vitamin D deficiency disease    Tremor    Seizure    Thoracic degenerative disc disease    Acute kidney injury (VIPUL) with acute tubular necrosis (ATN)    Hyperglycemia    Type 2 diabetes mellitus with hyperglycemia    Lower abdominal pain    Transaminitis    Emphysematous cystitis    Choledocholithiasis    Hypokalemia    Hypoxia    Generalized abdominal pain    History of Clostridium difficile infection    History of ERCP    Hypomagnesemia    Anxiety disorder    Neuropathic pain    Intertrigo    Weakness of both lower extremities    Accelerated hypertension    Acute cystitis without hematuria    Left lower lobe pneumonia    Cellulitis and abscess of left lower extremity    Benign hypertension with CKD (chronic kidney disease) stage IV    Peripheral edema    Primary malignant neuroendocrine tumor of pancreas    Encounter for long-term (current) use of  high-risk medication    Diabetic foot ulcer    CKD (chronic kidney disease) stage 4, GFR 15-29 ml/min    Pressure injury of buttock, stage 2    HTN (hypertension)    Anemia due to chronic kidney disease    Bilateral lower extremity edema    Cellulitis of right lower extremity    CKD (chronic kidney disease) stage 4, GFR 15-29 ml/min    Acute CHF    Bilateral cellulitis of lower leg    Acute UTI    UTI (urinary tract infection)    Acute UTI (urinary tract infection)    Metabolic acidosis    Cobalamin deficiency    Dependent edema    Gastroesophageal reflux disease    Mild neurocognitive disorder    Pancreatic neoplasm    Congestive heart failure    CHF exacerbation    Acute exacerbation of CHF (congestive heart failure)    Hypertensive urgency     Past Medical History:   Diagnosis Date    Acute metabolic encephalopathy 06/24/2022    Anxiety     Arthritis     Benign essential hypertension 08/20/2014    Bleeding disorder     Depression     Diabetes     Diabetes mellitus, type 2     Disc degeneration, lumbar     Headache, tension-type     Hyperlipidemia     Hypothyroidism     Neuropathy     Osteoporosis 09/09/2015    Pancreatic mass     Sept 2023, on Monthly Octreotide Injection    Peripheral neuropathy     Rotator cuff tear, left     Scoliosis     Shoulder pain     LEFT, TORN ROTATOR CUFF S/P FALL    Spinal stenosis      Past Surgical History:   Procedure Laterality Date    APPENDECTOMY      BILATERAL BREAST REDUCTION Bilateral 08/2015    CATARACT EXTRACTION  03/2015    CHOLECYSTECTOMY WITH INTRAOPERATIVE CHOLANGIOGRAM N/A 3/27/2022    Procedure: CHOLECYSTECTOMY LAPAROSCOPIC INTRAOPERATIVE CHOLANGIOGRAM;  Surgeon: Aiyana Hill MD;  Location: [facility] MAIN OR;  Service: General;  Laterality: N/A;    COLONOSCOPY  06/05/2015    WNL    ERCP N/A 2/25/2022    Procedure: ENDOSCOPIC RETROGRADE CHOLANGIOPANCREATOGRAPHY with sphincterotomy and balloon sweep;  Surgeon: Chilo Wilhelm MD;  Location: [facility] ENDOSCOPY;   Service: Gastroenterology;  Laterality: N/A;  PRE/POST - CBD stones    ERCP N/A 3/28/2022    Procedure: ENDOSCOPIC RETROGRADE CHOLANGIOPANCREATOGRAPHY WITH SPHINCTEROTOMY AND BALLOON SWEEP;  Surgeon: Chilo Wilhelm MD;  Location: [facility] ENDOSCOPY;  Service: Gastroenterology;  Laterality: N/A;  PRE: COMMON DUCT STONE  POST: COMMON DUCT STONE    KYPHOPLASTY      REDUCTION MAMMAPLASTY      TONSILLECTOMY        General Information       Row Name 02/21/25 1053          OT Time and Intention    Document Type therapy note (daily note)  -BC     Mode of Treatment individual therapy;occupational therapy  -BC     Patient Effort good  -BC       Row Name 02/21/25 1053          General Information    Patient Profile Reviewed yes  -BC     Existing Precautions/Restrictions fall  -BC       Row Name 02/21/25 1053          Cognition    Orientation Status (Cognition) oriented x 3  -BC       Row Name 02/21/25 1053          Safety Issues/Impairments Affecting Functional Mobility    Impairments Affecting Function (Mobility) balance;endurance/activity tolerance;strength  -BC               User Key  (r) = Recorded By, (t) = Taken By, (c) = Cosigned By      Initials Name Provider Type    BC Maryellen Mauricio OT Occupational Therapist                     Mobility/ADL's       Row Name 02/21/25 1053          Bed Mobility    Bed Mobility supine-sit  -BC     Supine-Sit [place] (Bed Mobility) contact guard  -BC     Bed Mobility, Safety Issues decreased use of legs for bridging/pushing  -BC     Assistive Device (Bed Mobility) bed rails;head of bed elevated  -BC       Row Name 02/21/25 1053          Transfers    Transfers sit-stand transfer;stand-sit transfer;bed-chair transfer  -BC       Row Name 02/21/25 1053          Bed-Chair Transfer    Bed-Chair [place] (Transfers) minimum assist (75% patient effort);1 person assist;verbal cues;nonverbal cues (demo/gesture);contact guard  -BC     Assistive Device (Bed-Chair Transfers) gladys  front-wheeled  -BC     Comment, (Bed-Chair Transfer) forward flexed posture, increased time with CGA to min A w/ walker to take pivoting side steps to chair.  -BC       Row Name 02/21/25 1053          Sit-Stand Transfer    Sit-Stand [place] (Transfers) minimum assist (75% patient effort);contact guard;verbal cues;nonverbal cues (demo/gesture)  -BC     Comment, (Sit-Stand Transfer) forward flexed posture, increased time to push up from bed w/ UEs to bring up to standing at walker  -BC       Row Name 02/21/25 1053          Stand-Sit Transfer    Stand-Sit [place] (Transfers) contact guard;verbal cues  -BC     Assistive Device (Stand-Sit Transfers) walker, front-wheeled  -BC     Comment, (Stand-Sit Transfer) cues for reach back  -BC       Row Name 02/21/25 1053          Activities of Daily Living    BADL Assessment/Intervention lower body dressing;grooming  -BC       Row Name 02/21/25 1053          Lower Body Dressing Assessment/Training    [place] Level (Lower Body Dressing) doff;don;socks;shoes/slippers;minimum assist (75% patient effort)  -BC     Position (Lower Body Dressing) sitting up in bed;edge of bed sitting  -BC     Comment, (Lower Body Dressing) Increased time unsupported long sit in bed w/ increased difficulty but Pt was able to succesfully remove B socks. Able to thread L slipper on, but unable to reach R, cont'd up to sitting EOB to initate bringing R foot into slipper but min A required  -BC       Row Name 02/21/25 1053          Grooming Assessment/Training    [place] Level (Grooming) wash face, hands;set up  -BC     Position (Grooming) supported sitting  -BC               User Key  (r) = Recorded By, (t) = Taken By, (c) = Cosigned By      Initials Name Provider Type    BC Maryellen Mauricio OT Occupational Therapist                   Obj/Interventions       Row Name 02/21/25 1056          Motor Skills    Functional Endurance fair  -BC       Row Name 02/21/25 1056          Balance     Balance Assessment sitting static balance;sitting dynamic balance;standing static balance;standing dynamic balance  -BC     Static Sitting Balance standby assist  -BC     Dynamic Sitting Balance supervision  -BC     Position, Sitting Balance sitting edge of bed  -BC     Static Standing Balance minimal assist;contact guard;verbal cues  -BC     Dynamic Standing Balance minimal assist;verbal cues  -BC     Position/Device Used, Standing Balance supported;walker, front-wheeled  -BC     Balance Interventions standing;supported  -BC     Comment, Balance forward flexed posture, incresed difficulty w/ weightshifting for pivot to chair impacting balance  -BC               User Key  (r) = Recorded By, (t) = Taken By, (c) = Cosigned By      Initials Name Provider Type    BC Maryellen Mauricio, TOMÁS Occupational Therapist                   Goals/Plan    No documentation.                  Clinical Impression       Row Name 02/21/25 1056          Pain Assessment    Pretreatment Pain Rating 0/10 - no pain  -BC     Posttreatment Pain Rating 0/10 - no pain  -BC     Pre/Posttreatment Pain Comment did not c/o pain  -BC       Row Name 02/21/25 1056          Plan of Care Review    Plan of Care Reviewed With patient  -BC     Progress improving  -BC     Outcome Evaluation Pt tolerated OT session well. Improved performance w/ LBD, initiation w mvmts to sitting up EOB unsupported and cont'd w/ pivot to chair w FWW and CGA to min A. Left up in chair w/ PT present to finish session for gait training following grooming tasks. Cont w POC and to rec SNU.  -BC       Row Name 02/21/25 1056          Therapy Assessment/Plan (OT)    Rehab Potential (OT) good  -BC     Criteria for Skilled Therapeutic Interventions Met (OT) yes;skilled treatment is necessary  -BC     Therapy Frequency (OT) 5 times/wk  -BC       Row Name 02/21/25 1056          Therapy Plan Review/Discharge Plan (OT)    Anticipated Discharge Disposition (OT) skilled nursing facility  -BC        Row Name 02/21/25 1056          Vital Signs    O2 Delivery Pre Treatment room air  -BC     O2 Delivery Intra Treatment room air  -BC     O2 Delivery Post Treatment room air  -BC     Pre Patient Position Supine  -BC     Intra Patient Position Standing  -BC     Post Patient Position Sitting  -BC       Row Name 02/21/25 1056          Positioning and Restraints    Pre-Treatment Position in bed  -BC     Post Treatment Position chair  -BC     In Chair with PT;sitting;call light within reach;encouraged to call for assist;exit alarm on  -BC               User Key  (r) = Recorded By, (t) = Taken By, (c) = Cosigned By      Initials Name Provider Type    Maryellen Gomes OT Occupational Therapist                   Outcome Measures       Row Name 02/21/25 1058          How much help from another is currently needed...    Putting on and taking off regular lower body clothing? 3  -BC     Bathing (including washing, rinsing, and drying) 2  -BC     Toileting (which includes using toilet bed pan or urinal) 1  -BC     Putting on and taking off regular upper body clothing 2  -BC     Taking care of personal grooming (such as brushing teeth) 3  -BC     Eating meals 4  -BC     AM-PAC 6 Clicks Score (OT) 15  -BC       Row Name 02/21/25 1058          Functional Assessment    Outcome Measure Options AM-PAC 6 Clicks Daily Activity (OT)  -BC               User Key  (r) = Recorded By, (t) = Taken By, (c) = Cosigned By      Initials Name Provider Type    Maryellen Gomes OT Occupational Therapist                    Occupational Therapy Education       Title: PT OT SLP Therapies (Done)       Topic: Occupational Therapy (Done)       Point: ADL training (Done)       Description:   Instruct learner(s) on proper safety adaptation and remediation techniques during self care or transfers.   Instruct in proper use of assistive devices.                  Learning Progress Summary            Patient Acceptance, E, VU by SD at 2/20/2025 4036     Acceptance, E, VU by PP at 2/18/2025 1014    Comment: Pt Ed on OT role and dc planning                      Point: Home exercise program (Done)       Description:   Instruct learner(s) on appropriate technique for monitoring, assisting and/or progressing therapeutic exercises/activities.                  Learning Progress Summary            Patient Acceptance, E, VU by SD at 2/20/2025 2259                      Point: Precautions (Done)       Description:   Instruct learner(s) on prescribed precautions during self-care and functional transfers.                  Learning Progress Summary            Patient Acceptance, E, VU by SD at 2/20/2025 2259                      Point: Body mechanics (Done)       Description:   Instruct learner(s) on proper positioning and spine alignment during self-care, functional mobility activities and/or exercises.                  Learning Progress Summary            Patient Acceptance, E, VU by SD at 2/20/2025 2259                                      User Key       Initials Effective Dates Name Provider Type Discipline    SD 11/01/24 -  Zachary Orona, RN Registered Nurse Nurse    PP 06/09/23 -  Jodee East OT Occupational Therapist OT                  OT Recommendation and Plan  Therapy Frequency (OT): 5 times/wk  Plan of Care Review  Plan of Care Reviewed With: patient  Progress: improving  Outcome Evaluation: Pt tolerated OT session well. Improved performance w/ LBD, initiation w mvmts to sitting up EOB unsupported and cont'd w/ pivot to chair w FWW and CGA to min A. Left up in chair w/ PT present to finish session for gait training following grooming tasks. Cont w POC and to rec SNU.     Time Calculation:         Time Calculation- OT       Row Name 02/21/25 1058             Time Calculation- OT    OT Start Time 0950  -BC      OT Stop Time 1000  -BC      OT Time Calculation (min) 10 min  -BC      Total Timed Code Minutes- OT 10 minute(s)  -BC      OT Received On 02/21/25  -BC       OT - Next Appointment 02/24/25  -BC         Timed Charges    24811 - OT Therapeutic Activity Minutes 10  -BC         Total Minutes    Timed Charges Total Minutes 10  -BC       Total Minutes 10  -BC                User Key  (r) = Recorded By, (t) = Taken By, (c) = Cosigned By      Initials Name Provider Type    BC Maryellen Mauricio OT Occupational Therapist                  Therapy Charges for Today       Code Description Service Date Service Provider Modifiers Qty    21195624459 HC OT THERAPEUTIC ACT EA 15 MIN 2/20/2025 Maryellen Mauricio OT GO 2    [id number]  OT SELF CARE/MGMT/TRAIN EA 15 MIN 2/20/2025 Maryellen Mauricio OT GO 1    [id number]  OT THERAPEUTIC ACT EA 15 MIN 2/21/2025 Maryellen Mauricio OT GO 1                 Maryellen Mauricio OT  2/21/2025

## 2025-02-21 NOTE — THERAPY TREATMENT NOTE
Acute Care - Physical Therapy Treatment Note  [facility]     Patient Name: Halie Guidry  : 1940  MRN: [id number]  Today's Date: 2025      Visit Dx:     ICD-10-CM ICD-9-CM   1. Accelerated hypertension  I10 401.0   2. General weakness  R53.1 780.79   3. Inability to walk  R26.2 719.7   4. Leukocytosis, unspecified type  D72.829 288.60   5. Anemia, unspecified type  D64.9 285.9   6. Chronic kidney disease, unspecified CKD stage  N18.9 585.9   7. Hyperglycemia  R73.9 790.29     Patient Active Problem List   Diagnosis    Compression fracture    Lumbar degenerative disc disease    Type 2 diabetes mellitus with hyperglycemia, with long-term current use of insulin    Hyperlipidemia    Benign essential hypertension    Primary hypothyroidism    Neuropathy    Osteoporosis    Proteinuria    Tobacco abuse    Generalized weakness    Spinal stenosis    Scoliosis    Arthritis    VBI (vertebrobasilar insufficiency)    Orthostatic hypotension    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    Severe hypothyroidism    Noncompliance with medication regimen    Vitamin D deficiency disease    Tremor    Seizure    Thoracic degenerative disc disease    Acute kidney injury (VIPUL) with acute tubular necrosis (ATN)    Hyperglycemia    Type 2 diabetes mellitus with hyperglycemia    Lower abdominal pain    Transaminitis    Emphysematous cystitis    Choledocholithiasis    Hypokalemia    Hypoxia    Generalized abdominal pain    History of Clostridium difficile infection    History of ERCP    Hypomagnesemia    Anxiety disorder    Neuropathic pain    Intertrigo    Weakness of both lower extremities    Accelerated hypertension    Acute cystitis without hematuria    Left lower lobe pneumonia    Cellulitis and abscess of left lower extremity    Benign hypertension with CKD (chronic kidney disease) stage IV    Peripheral edema    Primary malignant neuroendocrine tumor of pancreas    Encounter for long-term (current)  use of high-risk medication    Diabetic foot ulcer    CKD (chronic kidney disease) stage 4, GFR 15-29 ml/min    Pressure injury of buttock, stage 2    HTN (hypertension)    Anemia due to chronic kidney disease    Bilateral lower extremity edema    Cellulitis of right lower extremity    CKD (chronic kidney disease) stage 4, GFR 15-29 ml/min    Acute CHF    Bilateral cellulitis of lower leg    Acute UTI    UTI (urinary tract infection)    Acute UTI (urinary tract infection)    Metabolic acidosis    Cobalamin deficiency    Dependent edema    Gastroesophageal reflux disease    Mild neurocognitive disorder    Pancreatic neoplasm    Congestive heart failure    CHF exacerbation    Acute exacerbation of CHF (congestive heart failure)    Hypertensive urgency     Past Medical History:   Diagnosis Date    Acute metabolic encephalopathy 06/24/2022    Anxiety     Arthritis     Benign essential hypertension 08/20/2014    Bleeding disorder     Depression     Diabetes     Diabetes mellitus, type 2     Disc degeneration, lumbar     Headache, tension-type     Hyperlipidemia     Hypothyroidism     Neuropathy     Osteoporosis 09/09/2015    Pancreatic mass     Sept 2023, on Monthly Octreotide Injection    Peripheral neuropathy     Rotator cuff tear, left     Scoliosis     Shoulder pain     LEFT, TORN ROTATOR CUFF S/P FALL    Spinal stenosis      Past Surgical History:   Procedure Laterality Date    APPENDECTOMY      BILATERAL BREAST REDUCTION Bilateral 08/2015    CATARACT EXTRACTION  03/2015    CHOLECYSTECTOMY WITH INTRAOPERATIVE CHOLANGIOGRAM N/A 3/27/2022    Procedure: CHOLECYSTECTOMY LAPAROSCOPIC INTRAOPERATIVE CHOLANGIOGRAM;  Surgeon: Aiyana Hill MD;  [facility];  Service: General;  Laterality: N/A;    COLONOSCOPY  06/05/2015    WNL    ERCP N/A 2/25/2022    Procedure: ENDOSCOPIC RETROGRADE CHOLANGIOPANCREATOGRAPHY with sphincterotomy and balloon sweep;  Surgeon: Chilo Wilhelm MD;  Location: [facility]  ENDOSCOPY;  Service: Gastroenterology;  Laterality: N/A;  PRE/POST - CBD stones    ERCP N/A 3/28/2022    Procedure: ENDOSCOPIC RETROGRADE CHOLANGIOPANCREATOGRAPHY WITH SPHINCTEROTOMY AND BALLOON SWEEP;  Surgeon: Chilo Wilhelm MD;  Location: [facility] ENDOSCOPY;  Service: Gastroenterology;  Laterality: N/A;  PRE: COMMON DUCT STONE  POST: COMMON DUCT STONE    KYPHOPLASTY      REDUCTION MAMMAPLASTY      TONSILLECTOMY       PT Assessment (Last 12 Hours)       PT Evaluation and Treatment       Row Name 02/21/25 1100          Physical Therapy Time and Intention    Subjective Information no complaints  -     Document Type therapy note (daily note)  -     Mode of Treatment physical therapy  -     Patient Effort good  -[facility] Name 02/21/25 1100          General Information    Patient Profile Reviewed yes  -     Prior Level of Function w/c or scooter;independent:  -     Existing Precautions/Restrictions fall  -     Barriers to Rehab previous functional deficit  -[facility] Name 02/21/25 1100          Pain    Pretreatment Pain Rating 0/10 - no pain  -     Posttreatment Pain Rating 0/10 - no pain  -[facility] Name 02/21/25 1100          Cognition    Orientation Status (Cognition) oriented x 3  -[facility] Name 02/21/25 1100          Bed Mobility    Supine-Sit [place] (Bed Mobility) contact guard  -     Assistive Device (Bed Mobility) bed rails;head of bed elevated  -     Comment, (Bed Mobility) increased time required to complete task  -[facility] Name 02/21/25 1100          Sit-Stand Transfer    Sit-Stand [place] (Transfers) minimum assist (75% patient effort);contact guard;verbal cues;nonverbal cues (demo/gesture)  -     Assistive Device (Sit-Stand Transfers) walker, front-wheeled  -       Row Name 02/21/25 1100          Stand-Sit Transfer    Stand-Sit [place] (Transfers) contact guard  -     Assistive Device (Stand-Sit Transfers) walker, front-wheeled  -[facility]  Name 02/21/25 1100          Gait/Stairs (Locomotion)    [place] Level (Gait) contact guard;1 person to manage equipment  -     Assistive Device (Gait) walker, front-wheeled  -LH     Pattern (Gait) step-to;step-through  -LH     Deviations/Abnormal Patterns (Gait) bilateral deviations;tess decreased;weight shifting decreased;stride length decreased  -     Bilateral Gait Deviations forward flexed posture;heel strike decreased  -LH     Comment, (Gait/Stairs) 2nd person following w chair for safety  -LH       Row Name 02/21/25 1100          Balance    Static Sitting Balance supervision  -LH     Position, Sitting Balance supported  -     Static Standing Balance contact guard;minimal assist  -LH     Position/Device Used, Standing Balance supported;walker, front-wheeled  -LH       Row Name 02/21/25 1100          Motor Skills    Therapeutic Exercise --  APs, LAQs, MIP x 10  -LH       Row Name             Wound 02/18/25 0830 Left lower leg    Wound - Properties Group Placement Date: 02/18/25 -AD Placement Time: 0830 -AD Side: Left  -AD Orientation: lower  -AD Location: leg  -AD Primary Wound Type: Other  -AD Additional Comments: Cellulitis  -AD    Retired Wound - Properties Group Placement Date: 02/18/25  -AD Placement Time: 0830 -AD Side: Left  -AD Orientation: lower  -AD Location: leg  -AD Primary Wound Type: Other  -AD Additional Comments: Cellulitis  -AD    Retired Wound - Properties Group Placement Date: 02/18/25 -AD Placement Time: 0830 -AD Side: Left  -AD Orientation: lower  -AD Location: leg  -AD Primary Wound Type: Other  -AD Additional Comments: Cellulitis  -AD    Retired Wound - Properties Group Date first assessed: 02/18/25 -AD Time first assessed: 0830 -AD Side: Left  -AD Location: leg  -AD Primary Wound Type: Other  -AD Additional Comments: Cellulitis  -AD      Row Name             Wound 01/15/25 Right lower leg cellulitis;other (see comments)    Wound - Properties Group Placement Date:  01/15/25  -JA Side: Right  -JA Orientation: lower  -JA Location: leg  -JA Primary Wound Type: Other  -JA Type: cellulitis;other (see comments)  -JA, ulcer  Present on Original Admission: Y  -JA    Retired Wound - Properties Group Placement Date: 01/15/25  -JA Present on Original Admission: Y  -JA Side: Right  -JA Orientation: lower  -JA Location: leg  -JA Primary Wound Type: Other  -JA Type: cellulitis;other (see comments)  -JA, ulcer     Retired Wound - Properties Group Placement Date: 01/15/25  -JA Present on Original Admission: Y  -JA Side: Right  -JA Orientation: lower  -JA Location: leg  -JA Primary Wound Type: Other  -JA Type: cellulitis;other (see comments)  -JA, ulcer     Retired Wound - Properties Group Date first assessed: 01/15/25  -JA Present on Original Admission: Y  -JA Side: Right  -JA Location: leg  -JA Primary Wound Type: Other  -JA Type: cellulitis;other (see comments)  -JA, ulcer       Row Name             Wound 02/18/25 0830 Left anterior hip    Wound - Properties Group Placement Date: 02/18/25 -AD Placement Time: 0830 -AD Side: Left  -AD Orientation: anterior  -AD Location: hip  -AD    Retired Wound - Properties Group Placement Date: 02/18/25 -AD Placement Time: 0830 -AD Side: Left  -AD Orientation: anterior  -AD Location: hip  -AD    Retired Wound - Properties Group Placement Date: 02/18/25 -AD Placement Time: 0830 -AD Side: Left  -AD Orientation: anterior  -AD Location: hip  -AD    Retired Wound - Properties Group Date first assessed: 02/18/25 -AD Time first assessed: 0830 -AD Side: Left  -AD Location: hip  -AD      Row Name             Wound 02/18/25 0830 Right anterior hip    Wound - Properties Group Placement Date: 02/18/25  -AD Placement Time: 0830 -AD Side: Right  -AD Orientation: anterior  -AD Location: hip  -AD    Retired Wound - Properties Group Placement Date: 02/18/25 -AD Placement Time: 0830 -AD Side: Right  -AD Orientation: anterior  -AD Location: hip  -AD    Retired  Wound - Properties Group Placement Date: 02/18/25  -AD Placement Time: 0830 -AD Side: Right  -AD Orientation: anterior  -AD Location: hip  -AD    Retired Wound - Properties Group Date first assessed: 02/18/25  -AD Time first assessed: 0830 -AD Side: Right  -AD Location: hip  -AD      Row Name             Wound 10/10/24 1400 Bilateral lateral coccyx    Wound - Properties Group Placement Date: 10/10/24  -DT Placement Time: 1400  -DT Side: Bilateral  -DT Orientation: lateral  -DT Location: coccyx  -DT    Retired Wound - Properties Group Placement Date: 10/10/24  -DT Placement Time: 1400  -DT Side: Bilateral  -DT Orientation: lateral  -DT Location: coccyx  -DT    Retired Wound - Properties Group Placement Date: 10/10/24  -DT Placement Time: 1400  -DT Side: Bilateral  -DT Orientation: lateral  -DT Location: coccyx  -DT    Retired Wound - Properties Group Date first assessed: 10/10/24  -DT Time first assessed: 1400  -DT Side: Bilateral  -DT Location: coccyx  -DT      Row Name             Wound 10/14/24 1826 Right distal calf diabetic ulcer    Wound - Properties Group Placement Date: 10/14/24  -MR Placement Time: 1826  -MR Side: Right  -MR Orientation: distal  -MR Location: calf  -MR Primary Wound Type: Diabetic ulc  -MR Type: diabetic ulcer  -MR    Retired Wound - Properties Group Placement Date: 10/14/24  -MR Placement Time: 1826  -MR Side: Right  -MR Orientation: distal  -MR Location: calf  -MR Primary Wound Type: Diabetic ulc  -MR Type: diabetic ulcer  -MR    Retired Wound - Properties Group Placement Date: 10/14/24  -MR Placement Time: 1826  -MR Side: Right  -MR Orientation: distal  -MR Location: calf  -MR Primary Wound Type: Diabetic ulc  -MR Type: diabetic ulcer  -MR    Retired Wound - Properties Group Date first assessed: 10/14/24  -MR Time first assessed: 1826  -MR Side: Right  -MR Location: calf  -MR Primary Wound Type: Diabetic ulc  -MR Type: diabetic ulcer  -MR      Row Name 02/21/25 1100          Plan of  Care Review    Plan of Care Reviewed With patient  -     Progress improving  -     Outcome Evaluation Pt agreeable and cooperative w skilled PT this AM. Pt agreeable to seated LE strengthening therex in chair, pt also ambulated 20ft CGA x 2 rwx (following w chair for safety). Pt denies pain. will continue to prepare for DC, anticipate SNU.  -       Row Name 02/21/25 1100          Positioning and Restraints    Pre-Treatment Position in bed  -     Post Treatment Position chair  -     In Chair reclined;call light within reach;encouraged to call for assist;exit alarm on;notified nsg;legs elevated  -               User Key  (r) = Recorded By, (t) = Taken By, (c) = Cosigned By      Initials Name Provider Type     Gwendolyn Mon, PT Physical Therapist    Dhiraj Cotto, RN Registered Nurse    Diane Ordoñez, RN Registered Nurse    Clarisse Steiner, RN Registered Nurse    Merlyn Levi RN Registered Nurse                    Physical Therapy Education       Title: PT OT SLP Therapies (In Progress)       Topic: Physical Therapy (In Progress)       Point: Mobility training (In Progress)       Learning Progress Summary            Patient Acceptance, E, NR by  at 2/21/2025 1114    Acceptance, E, VU by SD at 2/20/2025 2259    Acceptance, E, NR by  at 2/19/2025 1625                      Point: Home exercise program (In Progress)       Learning Progress Summary            Patient Acceptance, E, NR by  at 2/21/2025 1114    Acceptance, E, VU by SD at 2/20/2025 2259    Acceptance, E, NR by  at 2/19/2025 1625                      Point: Body mechanics (In Progress)       Learning Progress Summary            Patient Acceptance, E, NR by  at 2/21/2025 1114    Acceptance, E, VU by SD at 2/20/2025 2259    Acceptance, E, NR by  at 2/19/2025 1625                      Point: Precautions (In Progress)       Learning Progress Summary            Patient Acceptance, E, NR by  at 2/21/2025 1114    Acceptance,  E, VU by SD at 2/20/2025 2259    Acceptance, E, NR by  at 2/19/2025 1625                                      User Key       Initials Effective Dates Name Provider Type [facility] 06/16/21 -  Gwendolyn Mon, PT Physical Therapist PT    SD 11/01/24 -  Zachary Orona, RN Registered Nurse Nurse                  PT Recommendation and Plan  Anticipated Discharge Disposition (PT): skilled nursing facility  Planned Therapy Interventions (PT): balance training, bed mobility training, gait training, home exercise program, ROM (range of motion), stair training, strengthening, stretching, transfer training  Plan of Care Reviewed With: patient  Progress: improving  Outcome Evaluation: Pt agreeable and cooperative w skilled PT this AM. Pt agreeable to seated LE strengthening therex in chair, pt also ambulated 20ft CGA x 2 rwx (following w chair for safety). Pt denies pain. will continue to prepare for DC, anticipate SNU.   Outcome Measures       Row Name 02/21/25 1100 02/19/25 1600          How much help from another person do you currently need...    Turning from your back to your side while in flat bed without using bedrails? 3  - 3  -LH     Moving from lying on back to sitting on the side of a flat bed without bedrails? 3  - 3  -LH     Moving to and from a bed to a chair (including a wheelchair)? 3  - 3  -LH     Standing up from a chair using your arms (e.g., wheelchair, bedside chair)? 2  - 2  -LH     Climbing 3-5 steps with a railing? 3  - 2  -LH     To walk in hospital room? 2  - 2  -     AM-PAC 6 Clicks Score (PT) 16  - 15  -               User Key  (r) = Recorded By, (t) = Taken By, (c) = Cosigned By      Initials Name Provider Type     Gwendolyn Mon, PT Physical Therapist                     Time Calculation:    PT Charges       Row Name 02/21/25 1114             Time Calculation    Start Time 1000  -      Stop Time 1015  -      Time Calculation (min) 15 min  -      PT Received On  02/21/25  -      PT - Next Appointment 02/24/25  -                User Key  (r) = Recorded By, (t) = Taken By, (c) = Cosigned By      Initials Name Provider Type     Gwendolyn Mon, PT Physical Therapist                  Therapy Charges for Today       Code Description Service Date Service Provider Modifiers Qty    [id number]  PT THER PROC EA 15 MIN 2/21/2025 Gwendolyn Mon, PT GP 1            PT G-Codes  Outcome Measure Options: AM-PAC 6 Clicks Daily Activity (OT)  AM-PAC 6 Clicks Score (PT): 16  AM-PAC 6 Clicks Score (OT): 15    Gwendolyn Mon, PT  2/21/2025

## 2025-02-22 LAB
BACTERIA SPEC AEROBE CULT: NORMAL
BACTERIA SPEC AEROBE CULT: NORMAL

## 2025-02-25 ENCOUNTER — HOSPITAL ENCOUNTER (OUTPATIENT)
Dept: CT IMAGING | Facility: HOSPITAL | Age: 85
Discharge: HOME | End: 2025-02-25
Admitting: INTERNAL MEDICINE
Payer: MEDICARE

## 2025-02-25 DIAGNOSIS — C7A.8 PRIMARY MALIGNANT NEUROENDOCRINE TUMOR OF PANCREAS: ICD-10-CM

## 2025-02-25 PROCEDURE — 71250 CT THORAX DX C-: CPT

## 2025-02-25 PROCEDURE — 74176 CT ABD & PELVIS W/O CONTRAST: CPT

## 2025-03-11 ENCOUNTER — SPECIALTY PHARMACY (OUTPATIENT)
Dept: ENDOCRINOLOGY | Age: 85
End: 2025-03-11
Payer: MEDICARE

## 2025-03-11 NOTE — PROGRESS NOTES
Specialty Pharmacy Patient Management Program  Program Disenrollment      Halie Guidry is a 84 y.o. female seen by an Endocrinology provider for Type 2 Diabetes. Patient was previously enrolled in the Endocrinology Patient Management program offered by [facility].      Sending a disenrollment letter to patient today as we have been unable to reach after multiple attempts. If we do not hear from patient within 7 days of sending the letter, they will be temporarily disenrolled from the specialty pharmacy program.        Arcelia Sarkar, PharmD, BCACP, BC-ADM, [facility]  Clinical Specialty Pharmacist, Endocrinology  3/11/2025  12:09 EDT

## 2025-03-21 ENCOUNTER — SPECIALTY PHARMACY (OUTPATIENT)
Dept: ENDOCRINOLOGY | Age: 85
End: 2025-03-21
Payer: MEDICARE

## 2025-03-21 NOTE — PROGRESS NOTES
Specialty Pharmacy Patient Management Program  Program Disenrollment      Halie Guidry is a 84 y.o. female seen by an Endocrinology provider for Type 2 Diabetes. Patient was previously enrolled in the Endocrinology Patient Management program offered by [facility].      Sending a disenrollment letter to patient today as we have been unable to reach after multiple attempts. If we do not hear from patient within 7 days of sending the letter, they will be temporarily disenrolled from the specialty pharmacy program.      It has been a pleasure taking part in this patients care and we would be happy to enroll them into the speciality pharmacy program again in the future.       Arcelia Sarkar, PharmD, BCACP, BC-ADM, [facility]  Clinical Specialty Pharmacist, Endocrinology  3/21/2025  12:27 EDT

## 2025-03-24 ENCOUNTER — TELEPHONE (OUTPATIENT)
Dept: FAMILY MEDICINE CLINIC | Facility: CLINIC | Age: 85
End: 2025-03-24

## 2025-03-24 NOTE — TELEPHONE ENCOUNTER
Caller: [email or web address]    Relationship:     Best call back number: [phone]     What is the best time to reach you: ANY    Who are you requesting to speak with (clinical staff, provider,  specific staff member):  IN OFFICE        What was the call regarding: SPEAK WITH  ABOUT PATIENT'S NEEDS    Is it okay if the provider responds through MyChart: PHONE CALL

## 2025-03-25 ENCOUNTER — PATIENT OUTREACH (OUTPATIENT)
Dept: CASE MANAGEMENT | Facility: OTHER | Age: 85
End: 2025-03-25
Payer: MEDICARE

## 2025-03-25 DIAGNOSIS — R53.1 GENERALIZED WEAKNESS: ICD-10-CM

## 2025-03-25 DIAGNOSIS — R32 INCONTINENCE OF URINE IN FEMALE: Primary | ICD-10-CM

## 2025-03-25 DIAGNOSIS — L03.115 BILATERAL CELLULITIS OF LOWER LEG: ICD-10-CM

## 2025-03-25 DIAGNOSIS — L03.116 BILATERAL CELLULITIS OF LOWER LEG: ICD-10-CM

## 2025-03-25 NOTE — OUTREACH NOTE
AMBULATORY CASE MANAGEMENT NOTE    Names and Relationships of Patient/Support Persons: Contact: Halie Guidry; Relationship: Self  Contact: Anitha; Relationship: Other -     [facility] Interim Update    Returned call from  therapist. Anitha from  requesting a purewick incontinent system for patient. Continues that patient is incontinent and takes lasix three times a day. Further states that patient attempts to ambulate and transfer self to toilet but does not make it most of the time due to her lower leg weakness.    Spoke with patient today, verified by name.    States that she requires assistance obtaining further help d/t her incontinence.     Patient verbalizing that she continues to have problems with her lower leg edema and weakness.     [facility] was able to obtain DME information that will bill Medicare Part B for female external catheters.   [facility] will need the following, and we have discussed this with Home Health:   - a prescription for a pump and 30 external catheters (per month) along with an appropriate diagnosis.   - notes to justify need includes:     - notes diagnosing incontinence    - bedridden status / or inability to toilet or ambulate to the toilet independently.    - Inability to use a tyson catheter and reason why  The fax will go to [phone] or email to [email or web address]    No further needs at this time. Next outreach has been scheduled.      Education Documentation  No documentation found.        Carol THORNTON  Ambulatory Case Management    3/25/2025, 16:00 EDT

## 2025-03-28 ENCOUNTER — TRANSCRIBE ORDERS (OUTPATIENT)
Age: 85
End: 2025-03-28
Payer: MEDICARE

## 2025-03-28 DIAGNOSIS — I73.9 PAD (PERIPHERAL ARTERY DISEASE): Primary | ICD-10-CM

## 2025-03-31 ENCOUNTER — PATIENT OUTREACH (OUTPATIENT)
Dept: CASE MANAGEMENT | Facility: OTHER | Age: 85
End: 2025-03-31
Payer: MEDICARE

## 2025-03-31 DIAGNOSIS — L03.116 BILATERAL CELLULITIS OF LOWER LEG: ICD-10-CM

## 2025-03-31 DIAGNOSIS — L03.115 BILATERAL CELLULITIS OF LOWER LEG: ICD-10-CM

## 2025-03-31 DIAGNOSIS — R32 INCONTINENCE OF URINE IN FEMALE: ICD-10-CM

## 2025-03-31 DIAGNOSIS — R53.1 GENERALIZED WEAKNESS: Primary | ICD-10-CM

## 2025-03-31 NOTE — PLAN OF CARE
Problem: Fall Risk  Goal: Prevent Falls and Injury  Outcome: Progressing  Intervention: My Fall Prevention To Do List  Description: Why is this important?Most falls happen when it is hard for you to walk safely. Your balance may be off because of an illness. You may have pain in your knees, hip or other joints.You may be overly tired or taking medicines that make you sleepy. You may not be able to see or hear clearly.Falls can lead to broken bones, bruises or other injuries.There are things you can do to help prevent falling.  Flowsheets (Taken 3/31/2025 1246)  My Fall Prevention To Do List:   always use handrails on the stairs   always wear low-heeled or flat shoes or slippers with nonskid soles   make an emergency alert plan in case I fall   use a cane or walker   use a nightlight in the bathroom   use a nonslip pad with throw rugs, or remove them completely   attend therapy  Goal: Optimal Care Coordination of a Patient Experiencing Fall Risk  Outcome: Progressing  Intervention: Identify and Manage Contributors to Fall Risk  Flowsheets (Taken 3/31/2025 1246)  Identify and Manage Contributors to Fall Risk:   assistive or adaptive device use encouraged   activities of daily living skills assessed   barriers to physical activity or exercise identified   barriers to safety identified   fall prevention plan reviewed and updated   incontinence managed   fear of falling, loss of independence and pain acknowledged   participation in rehabilitation therapy encouraged

## 2025-03-31 NOTE — OUTREACH NOTE
[facility] End of Month Documentation    This Chronic Medical Management Care Plan for Halie Guidry, 84 y.o. female, has been monitored and managed; reviewed and a new plan of care implemented for the month of March.  A cumulative time of 25  minutes was spent on this patient record this month, including chart review; phone call with patient; phone call with other providers; electronic communication with primary care provider, Need for patient assistance and incontinence training or DME product. Continued patient leg edema and weakness..    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; Severe hypothyroidism; Noncompliance with medication regimen; Vitamin D deficiency disease; Tremor; Seizure; Thoracic degenerative disc disease; Acute kidney injury (VIPUL) with acute tubular necrosis (ATN); Hyperglycemia; Type 2 diabetes mellitus with hyperglycemia; Lower abdominal pain; Transaminitis; Emphysematous cystitis; Choledocholithiasis; Hypokalemia; Hypoxia; Generalized abdominal pain; History of Clostridium difficile infection; History of ERCP; Hypomagnesemia; Anxiety disorder; Neuropathic pain; Intertrigo; Weakness of both lower extremities; Accelerated hypertension; Acute cystitis without hematuria; Left lower lobe pneumonia; Cellulitis and abscess of left lower extremity; Benign hypertension with CKD (chronic kidney disease) stage IV; Peripheral edema; Primary malignant neuroendocrine tumor of pancreas; Encounter for long-term (current) use of high-risk medication; Diabetic foot ulcer; CKD  (chronic kidney disease) stage 4, GFR 15-29 ml/min; Pressure injury of buttock, stage 2; HTN (hypertension); Anemia due to chronic kidney disease; Bilateral lower extremity edema; Cellulitis of right lower extremity; CKD (chronic kidney disease) stage 4, GFR 15-29 ml/min; Acute CHF; Bilateral cellulitis of lower leg; Acute UTI; UTI (urinary tract infection); Acute UTI (urinary tract infection); Metabolic acidosis; Cobalamin deficiency; Dependent edema; Gastroesophageal reflux disease; Mild neurocognitive disorder; Pancreatic neoplasm; Congestive heart failure; CHF exacerbation; Acute exacerbation of CHF (congestive heart failure); and Hypertensive urgency on their problem list., the following items were addressed: medical records; medications, care coordination/ disease education and any changes can be found within the plan section of the note.  A detailed listing of time spent for chronic care management is tracked within each outreach encounter.  Current medications include:  has a current medication list which includes the following prescription(s): amlodipine, atorvastatin, bisoprolol, duloxetine, ferrous sulfate, fluticasone, hydralazine, insulin glargine (2 unit dial), bd pen needle blayne 2nd gen, lansoprazole, levothyroxine, pregabalin, and torsemide. and the patient is reported to be patient is compliant with medication protocol,  Medications are reported to be effective, patient is compliant with medication/ noncompliant with activity needs and in home assistance.  Regarding these diagnoses, referrals were made to the following provider(s):  DME .  All notes on chart for PCP to review.    The patient was monitored remotely for activity level, in home needs/ further prevention of worsening lower leg edema or cellulitis.    The patient's physical needs include:  DME supplies; physical healthcare.     The patient's mental support needs include:  needs met    The patient's cognitive support needs include:  not  applicable    The patient's psychosocial support needs include:  not applicable    The patient's functional needs include: DME; physical healthcare; resources for disability needs, wheelchair use/ need for pure wick system d/t incontinence    The patient's environmental needs include:  not applicable, NA    Care Plan overall comments:  No data recorded    Refer to previous outreach notes for more information on the areas listed above.    Monthly Billing Diagnoses  (R53.1) Generalized weakness    (R32) Incontinence of urine in female    (L03.116,  L03.115) Bilateral cellulitis of lower leg    Medications   Medications have been reconciled    Care Plan progress this month:      Recently Modified Care Plans Updates made since 2/28/2025 12:00 AM      No recently modified care plans.             Fall Risk       Prevent Falls and Injury (Progressing)       Start:  03/31/25         My Fall Prevention To Do List       Start:  03/31/25       Why is this important?Most falls happen when it is hard for you to walk safely. Your balance may be off because of an illness. You may have pain in your knees, hip or other joints.You may be overly tired or taking medicines that make you sleepy. You may not be able to see or hear clearly.Falls can lead to broken bones, bruises or other injuries.There are things you can do to help prevent falling.       Initial    My Fall Prevention To Do List: always use handrails on the stairs;always wear low-heeled or flat shoes or slippers with nonskid soles;make an emergency alert plan in case I fall;use a cane or walker;use a nightlight in the bathroom;use a nonslip pad with throw rugs, or remove them completely;attend therapy taken at 03/31/25            Optimal Care Coordination of a Patient Experiencing Fall Risk (Progressing)       Start:  03/31/25         Identify and Manage Contributors to Fall Risk       Start:  03/31/25          Initial    Identify and Manage Contributors to Fall Risk:  assistive or adaptive device use encouraged;activities of daily living skills assessed;barriers to physical activity or exercise identified;barriers to safety identified;fall prevention plan reviewed and updated;incontinence managed;fear of falling, loss of independence and pain acknowledged;participation in rehabilitation therapy encouraged taken at 03/31/25           Instructions   Patient was provided an electronic copy of care plan  CCM services were explained and offered and patient has accepted these services.  Patient has given their written consent to receive CCM services and understands that this includes the authorization of electronic communication of medical information with the other treating providers.  Patient understands that they may stop CCM services at any time and these changes will be effective at the end of the calendar month and will effectively revocate the agreement of CCM services.  Patient understands that only one practitioner can furnish and be paid for CCM services during one calendar month.  Patient also understands that there may be co-payment or deductible fees in association with CCM services.  Patient will continue with at least monthly follow-up calls with the Ambulatory .    Carlo THORNTON  Ambulatory Case Management    3/31/2025, 12:58 EDT

## 2025-04-07 NOTE — TELEPHONE ENCOUNTER
Caller: EDMUND    Relationship to patient:     Best call back number: [phone]    Patient is needing: NEEDING TO SPEAK WITH HESHAM AND GET A GOOD FAX NUMBER FOR HER.

## 2025-04-08 ENCOUNTER — PATIENT OUTREACH (OUTPATIENT)
Dept: CASE MANAGEMENT | Facility: OTHER | Age: 85
End: 2025-04-08
Payer: MEDICARE

## 2025-04-08 ENCOUNTER — TELEPHONE (OUTPATIENT)
Dept: FAMILY MEDICINE CLINIC | Facility: CLINIC | Age: 85
End: 2025-04-08
Payer: MEDICARE

## 2025-04-08 ENCOUNTER — OFFICE VISIT (OUTPATIENT)
Age: 85
End: 2025-04-08
Payer: MEDICARE

## 2025-04-08 VITALS
HEIGHT: 68 IN | DIASTOLIC BLOOD PRESSURE: 66 MMHG | BODY MASS INDEX: 32.43 KG/M2 | WEIGHT: 214 LBS | HEART RATE: 60 BPM | SYSTOLIC BLOOD PRESSURE: 163 MMHG

## 2025-04-08 DIAGNOSIS — L03.115 BILATERAL CELLULITIS OF LOWER LEG: ICD-10-CM

## 2025-04-08 DIAGNOSIS — N18.4 CKD (CHRONIC KIDNEY DISEASE) STAGE 4, GFR 15-29 ML/MIN: ICD-10-CM

## 2025-04-08 DIAGNOSIS — E11.65 TYPE 2 DIABETES MELLITUS WITH HYPERGLYCEMIA, WITH LONG-TERM CURRENT USE OF INSULIN: ICD-10-CM

## 2025-04-08 DIAGNOSIS — Z79.4 TYPE 2 DIABETES MELLITUS WITH HYPERGLYCEMIA, WITH LONG-TERM CURRENT USE OF INSULIN: ICD-10-CM

## 2025-04-08 DIAGNOSIS — R32 INCONTINENCE OF URINE IN FEMALE: Primary | ICD-10-CM

## 2025-04-08 DIAGNOSIS — R60.0 LOCAL EDEMA: ICD-10-CM

## 2025-04-08 DIAGNOSIS — I87.2 VENOUS INSUFFICIENCY: ICD-10-CM

## 2025-04-08 DIAGNOSIS — L03.116 BILATERAL CELLULITIS OF LOWER LEG: ICD-10-CM

## 2025-04-08 DIAGNOSIS — I50.9 ACUTE CONGESTIVE HEART FAILURE, UNSPECIFIED HEART FAILURE TYPE: ICD-10-CM

## 2025-04-08 DIAGNOSIS — I73.9 PERIPHERAL VASCULAR DISEASE, UNSPECIFIED: Primary | ICD-10-CM

## 2025-04-08 DIAGNOSIS — R53.1 GENERALIZED WEAKNESS: ICD-10-CM

## 2025-04-08 PROCEDURE — 3077F SYST BP >= 140 MM HG: CPT | Performed by: SURGERY

## 2025-04-08 PROCEDURE — 1160F RVW MEDS BY RX/DR IN RCRD: CPT | Performed by: SURGERY

## 2025-04-08 PROCEDURE — 99204 OFFICE O/P NEW MOD 45 MIN: CPT | Performed by: SURGERY

## 2025-04-08 PROCEDURE — 3078F DIAST BP <80 MM HG: CPT | Performed by: SURGERY

## 2025-04-08 PROCEDURE — 1159F MED LIST DOCD IN RCRD: CPT | Performed by: SURGERY

## 2025-04-08 NOTE — PROGRESS NOTES
"Chief Complaint  Edema    Subjective        Halie Guidry presents to [facility] VASCULAR SURGERY  History of Present Illness    New patient evaluation for lower extremity nonhealing wounds.  Patient is a poor historian per reports she lives in an assisted living facility.  She is unclear which 1 she is housing and currently.  There is no family or friends with her.  She says she gets some in facility care via care tenders.    Objective   Vital Signs:  /66   Pulse 60   Ht 172.7 cm (68\")   Wt 97.1 kg (214 lb)   BMI 32.54 kg/m²   Estimated body mass index is 32.54 kg/m² as calculated from the following:    Height as of this encounter: 172.7 cm (68\").    Weight as of this encounter: 97.1 kg (214 lb).           Halie Guidry  reports that she quit smoking about 12 years ago. Her smoking use included cigarettes. She started smoking about 52 years ago. She has a 40 pack-year smoking history. She has been exposed to tobacco smoke. She has never used smokeless tobacco.      Physical Exam  Constitutional:       Appearance: She is well-developed.   Pulmonary:      Effort: Pulmonary effort is normal. No respiratory distress.   Abdominal:      General: There is no distension.      Palpations: Abdomen is soft.   Neurological:      Mental Status: She is alert and oriented to person, place, and time.     Monophasic dorsalis pedis artery signals bilaterally.    Noninfected appearing right posterior calf venous stasis related ulcer.    Swelling related ulcerations on the dorsal aspect of the left foot of superficial nature.    Result Review :    The following data was reviewed by: Ke Kilgore MD on 04/08/25  CBC          2/17/2025    14:04 2/19/2025    05:38 2/20/2025    04:21   CBC   WBC 13.00  8.78  7.42    RBC 3.48  3.12  3.10    Hemoglobin 9.6  8.5  8.6    Hematocrit 29.9  26.3  26.5    MCV 85.9  84.3  85.5    MCH 27.6  27.2  27.7    MCHC 32.1  32.3  32.5    RDW 15.7  15.5  15.3  "   Platelets 303  293  294       BMP          2/18/2025    05:57 2/19/2025    05:38 2/20/2025    04:21   BMP   BUN 45  47  49    Creatinine 2.06  2.27  2.61    Sodium 142  143  141    Potassium 3.9  3.8  3.7    Chloride 107  107  106    CO2 22.0  21.0  20.0    Calcium 9.0  8.7  8.5       A1C Last 3 Results          8/24/2024    17:33 10/12/2024    05:50 11/6/2024    16:02   HGBA1C Last 3 Results   Hemoglobin A1C 7.90  10.30  9.10        Data reviewed : Cardiology studies as below  Vascular Surgical History:    Vascular Imaging History:  Lower extremity venous evaluation:  10/13/2024:   Normal bilateral lower extremity venous duplex scan.     (Text in bold font has been individually reviewed by myself and confirmed)         Assessment and Plan     Vascular surgery following for:  Peripheral arterial disease with venous stasis ulcerations.    Diagnoses and all orders for this visit:    1. Peripheral vascular disease, unspecified (Primary)  -     Doppler Arterial Multi Level Lower Extremity - Bilateral CAR; Future    2. CKD (chronic kidney disease) stage 4, GFR 15-29 ml/min    3. Local edema  -     Venous w Reflux Lower Extremity - Unilateral CAR; Future    4. Venous insufficiency  -     Venous w Reflux Lower Extremity - Unilateral CAR; Future    Challenging situation given what I suspect is significant arterial and venous insufficiency disease.  She has an active venous ulceration that is quite painful but does not appear to be infected on the right posterior calf.  Has a moderate size.  She does receive care via care tenders at her long-term care facility.  I have given her a prescription with my recommendations for wound care.  They are as followed.  Right leg should have silver alginate to the open wound with an Unna boot dressing over top changed every 2 to 3 days.  The left foot wound should have silver alginate placed to the open dorsal foot wound and the left leg should be gently wrapped with a 6 inch Ace wrap  every 2 to 3 days.  Ultimately further investigation of the severity of her arterial disease is required and we will obtain a lower extremity arterial multisegmental Doppler at the hospital the patient's earliest convenience.  Will also check class II right leg vein mapping to evaluate for correctable venous abnormalities contributing to her venous stasis ulceration.      Vascular medical management:Patient should continue Lipitor 80 mg daily.  Return in about 1 month (around 5/8/2025), or if symptoms worsen or fail to improve, for Follow up with vascular ultrasound.  Patient was given instructions and counseling regarding her condition or for health maintenance advice. Please see specific information pulled into the AVS if appropriate.

## 2025-04-08 NOTE — TELEPHONE ENCOUNTER
HUB TO RELAY  Left patient a voicemail on home phone cell phone voicemail was not set-up.  Instructed patient to contact our office to get her hospital follow-up visit scheduled

## 2025-04-08 NOTE — OUTREACH NOTE
AMBULATORY CASE MANAGEMENT NOTE    Names and Relationships of Patient/Support Persons: Contact: Anitha; Relationship: Other -     [facility] Interim Update    Spoke with Anitha from [facility].    States that she is faxing documentation from therapy, and nursing to assist in obtaining DME female external catheter. We have collaborated with PCP regarding appropriate documentation to assist with this endeavor on patient's behalf.    Patient encouraged to follow up with PCP to complete DME documentation along with hospital f/u for hypertensive urgency. Forwarding to scheduling for assistance.     No further needs at this time. Next outreach has been scheduled.    [facility] has updated care plan to include Uncontrolled HTN monitoring.    Education Documentation  No documentation found.        Carol THORNTON  Ambulatory Case Management    4/8/2025, 09:30 EDT

## 2025-04-08 NOTE — PLAN OF CARE
Problem: Fall Risk  Goal: Prevent Falls and Injury  Outcome: Progressing  Goal: Optimal Care Coordination of a Patient Experiencing Fall Risk  Outcome: Progressing     Problem: Hypertension  Goal: Make Healthy Diet Choices  Outcome: Not Progressing  Goal: Track and Manage My Blood Pressure  Outcome: Not Progressing  Goal: [facility]  Outcome: Progressing

## 2025-04-09 ENCOUNTER — PATIENT OUTREACH (OUTPATIENT)
Dept: CASE MANAGEMENT | Facility: OTHER | Age: 85
End: 2025-04-09
Payer: MEDICARE

## 2025-04-09 DIAGNOSIS — L03.116 BILATERAL CELLULITIS OF LOWER LEG: ICD-10-CM

## 2025-04-09 DIAGNOSIS — N39.0 ACUTE UTI (URINARY TRACT INFECTION): ICD-10-CM

## 2025-04-09 DIAGNOSIS — L03.115 BILATERAL CELLULITIS OF LOWER LEG: ICD-10-CM

## 2025-04-09 DIAGNOSIS — R32 INCONTINENCE OF URINE IN FEMALE: Primary | ICD-10-CM

## 2025-04-09 NOTE — OUTREACH NOTE
AMBULATORY CASE MANAGEMENT NOTE    Names and Relationships of Patient/Support Persons: Contact: Anitha; Relationship: Other -     Care Coordination    Patient notes received from [facility] today. Made HH aware and will begin process to obtain DME need for in home purewick external catheter system.    No further needs at this time.    Education Documentation  No documentation found.        Carol THORNTON  Ambulatory Case Management    4/9/2025, 09:53 EDT

## 2025-04-11 ENCOUNTER — TELEPHONE (OUTPATIENT)
Dept: FAMILY MEDICINE CLINIC | Facility: CLINIC | Age: 85
End: 2025-04-11

## 2025-04-11 NOTE — TELEPHONE ENCOUNTER
Caller: [facility]    Relationship: Other    Best call back number: [phone]     What is the best time to reach you: ANYTIME    Who are you requesting to speak with (clinical staff, provider,  specific staff member): ERIN BRUNO    Do you know the name of the person who called: IVET    What was the call regarding: IVET WITH [facility] IS ATTEMPTING TO REACH HAYDEN KIANA REGARDING THE PATIENT.    IVET STATED SHE WAS GIVEN A PHONE NUMBER FOR HER, HOWEVER THIS ERACHED A NURSING HOME.    PLEASE CALL TO DISCUSS.

## 2025-04-14 ENCOUNTER — TELEPHONE (OUTPATIENT)
Dept: CASE MANAGEMENT | Facility: OTHER | Age: 85
End: 2025-04-14
Payer: MEDICARE

## 2025-04-14 ENCOUNTER — PATIENT OUTREACH (OUTPATIENT)
Dept: CASE MANAGEMENT | Facility: OTHER | Age: 85
End: 2025-04-14
Payer: MEDICARE

## 2025-04-14 DIAGNOSIS — N39.45 CONTINUOUS LEAKAGE OF URINE: Primary | ICD-10-CM

## 2025-04-14 DIAGNOSIS — N18.4 CKD (CHRONIC KIDNEY DISEASE) STAGE 4, GFR 15-29 ML/MIN: ICD-10-CM

## 2025-04-14 DIAGNOSIS — L02.416 CELLULITIS AND ABSCESS OF LEFT LOWER EXTREMITY: ICD-10-CM

## 2025-04-14 DIAGNOSIS — R32 INCONTINENCE OF URINE IN FEMALE: Primary | ICD-10-CM

## 2025-04-14 DIAGNOSIS — L03.116 CELLULITIS AND ABSCESS OF LEFT LOWER EXTREMITY: ICD-10-CM

## 2025-04-14 DIAGNOSIS — R53.1 GENERALIZED WEAKNESS: ICD-10-CM

## 2025-04-14 DIAGNOSIS — N39.45 CONTINUOUS LEAKAGE OF URINE: ICD-10-CM

## 2025-04-14 DIAGNOSIS — R60.0 BILATERAL LOWER EXTREMITY EDEMA: ICD-10-CM

## 2025-04-14 NOTE — OUTREACH NOTE
AMBULATORY CASE MANAGEMENT NOTE    Names and Relationships of Patient/Support Persons: Contact: Anitha; Relationship: Other -     [facility] Interim Update    Completed referrals and forwarded to PCP for review and signature.     Spoke with Anitha, from [facility]. Made her aware that referrals have been placed and awaiting further instructions from Ms. Hanson from [facility].    No further needs at this time. Next outreach has been scheduled.        Education Documentation  No documentation found.        Carol THORNTON  Ambulatory Case Management    4/14/2025, 13:56 EDT

## 2025-04-17 ENCOUNTER — HOSPITAL ENCOUNTER (EMERGENCY)
Facility: HOSPITAL | Age: 85
Discharge: HOME | End: 2025-04-17
Attending: EMERGENCY MEDICINE
Payer: MEDICARE

## 2025-04-17 VITALS
SYSTOLIC BLOOD PRESSURE: 174 MMHG | TEMPERATURE: 98 F | OXYGEN SATURATION: 97 % | HEART RATE: 73 BPM | RESPIRATION RATE: 16 BRPM | DIASTOLIC BLOOD PRESSURE: 61 MMHG

## 2025-04-17 DIAGNOSIS — I10 HYPERTENSION, UNSPECIFIED TYPE: ICD-10-CM

## 2025-04-17 DIAGNOSIS — N18.4 STAGE 4 CHRONIC KIDNEY DISEASE: Primary | ICD-10-CM

## 2025-04-17 LAB
ALBUMIN SERPL-MCNC: 3.3 G/DL (ref 3.5–5.2)
ALBUMIN/GLOB SERPL: 0.7 G/DL
ALP SERPL-CCNC: 175 U/L (ref 39–117)
ALT SERPL W P-5'-P-CCNC: 5 U/L (ref 1–33)
ANION GAP SERPL CALCULATED.3IONS-SCNC: 11.1 MMOL/L (ref 5–15)
AST SERPL-CCNC: 8 U/L (ref 1–32)
BASOPHILS # BLD AUTO: 0.12 10*3/MM3 (ref 0–0.2)
BASOPHILS NFR BLD AUTO: 1 % (ref 0–1.5)
BILIRUB SERPL-MCNC: 0.2 MG/DL (ref 0–1.2)
BUN SERPL-MCNC: 58 MG/DL (ref 8–23)
BUN/CREAT SERPL: 22.2 (ref 7–25)
CALCIUM SPEC-SCNC: 9.1 MG/DL (ref 8.6–10.5)
CHLORIDE SERPL-SCNC: 108 MMOL/L (ref 98–107)
CO2 SERPL-SCNC: 18.9 MMOL/L (ref 22–29)
CREAT SERPL-MCNC: 2.61 MG/DL (ref 0.57–1)
DEPRECATED RDW RBC AUTO: 47.8 FL (ref 37–54)
EGFRCR SERPLBLD CKD-EPI 2021: 17.6 ML/MIN/1.73
EOSINOPHIL # BLD AUTO: 0.44 10*3/MM3 (ref 0–0.4)
EOSINOPHIL NFR BLD AUTO: 3.6 % (ref 0.3–6.2)
ERYTHROCYTE [DISTWIDTH] IN BLOOD BY AUTOMATED COUNT: 15.2 % (ref 12.3–15.4)
GLOBULIN UR ELPH-MCNC: 4.8 GM/DL
GLUCOSE SERPL-MCNC: 190 MG/DL (ref 65–99)
HCT VFR BLD AUTO: 27 % (ref 34–46.6)
HGB BLD-MCNC: 8.6 G/DL (ref 12–15.9)
IMM GRANULOCYTES # BLD AUTO: 0.08 10*3/MM3 (ref 0–0.05)
IMM GRANULOCYTES NFR BLD AUTO: 0.6 % (ref 0–0.5)
LYMPHOCYTES # BLD AUTO: 0.81 10*3/MM3 (ref 0.7–3.1)
LYMPHOCYTES NFR BLD AUTO: 6.6 % (ref 19.6–45.3)
MAGNESIUM SERPL-MCNC: 2.1 MG/DL (ref 1.6–2.4)
MCH RBC QN AUTO: 27.6 PG (ref 26.6–33)
MCHC RBC AUTO-ENTMCNC: 31.9 G/DL (ref 31.5–35.7)
MCV RBC AUTO: 86.5 FL (ref 79–97)
MONOCYTES # BLD AUTO: 0.86 10*3/MM3 (ref 0.1–0.9)
MONOCYTES NFR BLD AUTO: 7 % (ref 5–12)
NEUTROPHILS NFR BLD AUTO: 10.03 10*3/MM3 (ref 1.7–7)
NEUTROPHILS NFR BLD AUTO: 81.2 % (ref 42.7–76)
NRBC BLD AUTO-RTO: 0 /100 WBC (ref 0–0.2)
NT-PROBNP SERPL-MCNC: 2513 PG/ML (ref 0–1800)
PHOSPHATE SERPL-MCNC: 5 MG/DL (ref 2.5–4.5)
PLATELET # BLD AUTO: 345 10*3/MM3 (ref 140–450)
PMV BLD AUTO: 9.4 FL (ref 6–12)
POTASSIUM SERPL-SCNC: 5.2 MMOL/L (ref 3.5–5.2)
PROT SERPL-MCNC: 8.1 G/DL (ref 6–8.5)
RBC # BLD AUTO: 3.12 10*6/MM3 (ref 3.77–5.28)
SODIUM SERPL-SCNC: 138 MMOL/L (ref 136–145)
WBC NRBC COR # BLD AUTO: 12.34 10*3/MM3 (ref 3.4–10.8)

## 2025-04-17 PROCEDURE — 36415 COLL VENOUS BLD VENIPUNCTURE: CPT

## 2025-04-17 PROCEDURE — 80053 COMPREHEN METABOLIC PANEL: CPT | Performed by: EMERGENCY MEDICINE

## 2025-04-17 PROCEDURE — 85025 COMPLETE CBC W/AUTO DIFF WBC: CPT | Performed by: EMERGENCY MEDICINE

## 2025-04-17 PROCEDURE — 84100 ASSAY OF PHOSPHORUS: CPT | Performed by: EMERGENCY MEDICINE

## 2025-04-17 PROCEDURE — 83735 ASSAY OF MAGNESIUM: CPT | Performed by: EMERGENCY MEDICINE

## 2025-04-17 PROCEDURE — 99283 EMERGENCY DEPT VISIT LOW MDM: CPT

## 2025-04-17 PROCEDURE — 83880 ASSAY OF NATRIURETIC PEPTIDE: CPT | Performed by: EMERGENCY MEDICINE

## 2025-04-17 RX ORDER — HYDRALAZINE HYDROCHLORIDE 20 MG/ML
20 INJECTION INTRAMUSCULAR; INTRAVENOUS ONCE
Status: DISCONTINUED | OUTPATIENT
Start: 2025-04-17 | End: 2025-04-17

## 2025-04-17 RX ORDER — CLONIDINE HYDROCHLORIDE 0.1 MG/1
0.1 TABLET ORAL ONCE
Status: COMPLETED | OUTPATIENT
Start: 2025-04-17 | End: 2025-04-17

## 2025-04-17 RX ORDER — ACETAMINOPHEN 500 MG
1000 TABLET ORAL ONCE
Status: COMPLETED | OUTPATIENT
Start: 2025-04-17 | End: 2025-04-17

## 2025-04-17 RX ADMIN — CLONIDINE HYDROCHLORIDE 0.1 MG: 0.1 TABLET ORAL at 19:05

## 2025-04-17 RX ADMIN — ACETAMINOPHEN 1000 MG: 500 TABLET, FILM COATED ORAL at 18:47

## 2025-04-23 ENCOUNTER — TELEPHONE (OUTPATIENT)
Dept: FAMILY MEDICINE CLINIC | Facility: CLINIC | Age: 85
End: 2025-04-23

## 2025-04-23 NOTE — TELEPHONE ENCOUNTER
Caller: IVET OCAMPO    Relationship to patient: Caregiver (non-relative)    Best call back number: [phone]     Patient is needing: REQUESTING A CALL BACK FROM JENNIFER TO DISCUSS PATIENT'S PURE WICK

## 2025-04-25 ENCOUNTER — PATIENT OUTREACH (OUTPATIENT)
Dept: CASE MANAGEMENT | Facility: OTHER | Age: 85
End: 2025-04-25
Payer: MEDICARE

## 2025-04-25 DIAGNOSIS — R32 INCONTINENCE OF URINE IN FEMALE: Primary | ICD-10-CM

## 2025-04-25 NOTE — OUTREACH NOTE
AMBULATORY CASE MANAGEMENT NOTE    Names and Relationships of Patient/Support Persons: Contact: Anitha; Relationship: Other -     [facility] Interim Update    Spoke with VINCENZO Welsh occupational therapist.     States that patient received a call from someone that informed her that she does not qualify for a Pure Wick System. Further acknowledges that she doesn't know who called her, or where they were from.    ACM reviewed qualifying factors with Anitha and documentation sent was reviewed to make sure appropriate documentation met qualifying factors.    Email was sent to MsJanet William with [facility], requesting further assistance to obtain Pure Wick System for patient.    No further needs at this time. We have scheduled our next outreach.          Education Documentation  No documentation found.        Carol THORNTON  Ambulatory Case Management    4/25/2025, 09:45 EDT

## 2025-04-29 ENCOUNTER — TELEPHONE (OUTPATIENT)
Dept: FAMILY MEDICINE CLINIC | Facility: CLINIC | Age: 85
End: 2025-04-29
Payer: MEDICARE

## 2025-04-29 NOTE — TELEPHONE ENCOUNTER
Spoke with Anitha who was advised that PCP agreed to V/O as requested. Anitha verbalized understanding.

## 2025-04-30 ENCOUNTER — PATIENT OUTREACH (OUTPATIENT)
Dept: CASE MANAGEMENT | Facility: OTHER | Age: 85
End: 2025-04-30
Payer: MEDICARE

## 2025-04-30 DIAGNOSIS — L02.416 CELLULITIS AND ABSCESS OF LEFT LOWER EXTREMITY: ICD-10-CM

## 2025-04-30 DIAGNOSIS — R53.1 GENERALIZED WEAKNESS: ICD-10-CM

## 2025-04-30 DIAGNOSIS — R32 INCONTINENCE OF URINE IN FEMALE: Primary | ICD-10-CM

## 2025-04-30 DIAGNOSIS — L03.116 CELLULITIS AND ABSCESS OF LEFT LOWER EXTREMITY: ICD-10-CM

## 2025-04-30 DIAGNOSIS — R60.0 BILATERAL LOWER EXTREMITY EDEMA: ICD-10-CM

## 2025-05-01 NOTE — OUTREACH NOTE
[facility] End of Month Documentation    This Chronic Medical Management Care Plan for Halie Guidry, 84 y.o. female, has been monitored and managed; reviewed and a new plan of care implemented for the month of April.  A cumulative time of 20  minutes was spent on this patient record this month, including chart review; phone call with patient; phone call with other providers; electronic communication with primary care provider, [facility] communication/ OT contact and continued monitoring of bilateral lower leg edema, weakness, incontinent needs.    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; Severe hypothyroidism; Noncompliance with medication regimen; Vitamin D deficiency disease; Tremor; Seizure; Thoracic degenerative disc disease; Acute kidney injury (VIPUL) with acute tubular necrosis (ATN); Hyperglycemia; Type 2 diabetes mellitus with hyperglycemia; Lower abdominal pain; Transaminitis; Emphysematous cystitis; Choledocholithiasis; Hypokalemia; Hypoxia; Generalized abdominal pain; History of Clostridium difficile infection; History of ERCP; Hypomagnesemia; Anxiety disorder; Neuropathic pain; Intertrigo; Weakness of both lower extremities; Accelerated hypertension; Acute cystitis without hematuria; Left lower lobe pneumonia; Cellulitis and abscess of left lower extremity; Benign hypertension with CKD (chronic kidney disease) stage IV; Peripheral edema; Primary malignant neuroendocrine tumor of pancreas; Encounter for long-term (current) use of high-risk medication; Diabetic foot ulcer; CKD  (chronic kidney disease) stage 4, GFR 15-29 ml/min; Pressure injury of buttock, stage 2; HTN (hypertension); Anemia due to chronic kidney disease; Bilateral lower extremity edema; Cellulitis of right lower extremity; CKD (chronic kidney disease) stage 4, GFR 15-29 ml/min; Acute CHF; Bilateral cellulitis of lower leg; Acute UTI; UTI (urinary tract infection); Acute UTI (urinary tract infection); Metabolic acidosis; Cobalamin deficiency; Dependent edema; Gastroesophageal reflux disease; Mild neurocognitive disorder; Pancreatic neoplasm; Congestive heart failure; CHF exacerbation; Acute exacerbation of CHF (congestive heart failure); and Hypertensive urgency on their problem list., the following items were addressed: medical records; medications, care coordination/ disease education and any changes can be found within the plan section of the note.  A detailed listing of time spent for chronic care management is tracked within each outreach encounter.  Current medications include:  has a current medication list which includes the following prescription(s): amlodipine, atorvastatin, bisoprolol, duloxetine, ferrous sulfate, fluticasone, hydralazine, insulin glargine (2 unit dial), bd pen needle blayne 2nd gen, lansoprazole, levothyroxine, pregabalin, and torsemide. and the patient is reported to be patient is compliant with medication protocol,  Medications are reported to be effective, patient is compliant with medication/ noncompliant with activity needs and in home assistance.  Regarding these diagnoses, referrals were made to the following provider(s):  [facility] DME supplies.  All notes on chart for PCP to review.    The patient was monitored remotely for activity level, in home needs/ further prevention of worsening lower leg edema or cellulitis/ Incontinent needs.    The patient's physical needs include:  DME supplies; physical healthcare.     The patient's mental support needs include:  needs met    The patient's  cognitive support needs include:  not applicable    The patient's psychosocial support needs include:  not applicable    The patient's functional needs include: DME; physical healthcare; resources for disability needs, wheelchair use/ need for pure wick system d/t incontinence    The patient's environmental needs include:  not applicable, NA    Care Plan overall comments:  No data recorded    Refer to previous outreach notes for more information on the areas listed above.    Monthly Billing Diagnoses  (R32) Incontinence of urine in female    (R60.0) Bilateral lower extremity edema    (R53.1) Generalized weakness    (L03.116,  L02.416) Cellulitis and abscess of left lower extremity    Medications   Medications have been reconciled    Care Plan progress this month:      Recently Modified Care Plans Updates made since 3/31/2025 12:00 AM      No recently modified care plans.             Fall Risk       Prevent Falls and Injury (Progressing)       Start:  03/31/25         My Fall Prevention To Do List       Start:  03/31/25       Why is this important?Most falls happen when it is hard for you to walk safely. Your balance may be off because of an illness. You may have pain in your knees, hip or other joints.You may be overly tired or taking medicines that make you sleepy. You may not be able to see or hear clearly.Falls can lead to broken bones, bruises or other injuries.There are things you can do to help prevent falling.       Initial    My Fall Prevention To Do List: always use handrails on the stairs;always wear low-heeled or flat shoes or slippers with nonskid soles;make an emergency alert plan in case I fall;use a cane or walker;use a nightlight in the bathroom;use a nonslip pad with throw rugs, or remove them completely;attend therapy taken at 03/31/25            Optimal Care Coordination of a Patient Experiencing Fall Risk (Progressing)       Start:  03/31/25         Identify and Manage Contributors to Fall Risk        Start:  03/31/25          Initial    Identify and Manage Contributors to Fall Risk: assistive or adaptive device use encouraged;activities of daily living skills assessed;barriers to physical activity or exercise identified;barriers to safety identified;fall prevention plan reviewed and updated;incontinence managed;fear of falling, loss of independence and pain acknowledged;participation in rehabilitation therapy encouraged taken at 03/31/25           Hypertension       Make Healthy Diet Choices (Not Progressing)       Start:  04/08/25         My Healthy Diet To Do List       Start:  04/08/25       Why is this important?Taking small steps to change how you eat is a good place to start.            Track and Manage My Blood Pressure (Not Progressing)       Start:  04/08/25         My Blood Pressure Management To Do List       Start:  04/08/25       Why is this important?You may not be able to feel high blood pressure, but it can still hurt your body.High blood pressure can cause heart or kidney problems. It can also cause a stroke.Checking your blood pressure at home and at different times of the day can help to control blood pressure.            [place] My Medicine (Progressing)       Start:  04/08/25         My Medication Management To Do List       Start:  04/08/25       Why is this important?It is important to take your medicine so that you can control your condition and prevent problems.Even if you do not feel your high blood pressure, it is still important to take your medicine.Call the office if you cannot afford the medicine or if you have questions about it.            Optimal Care Coordination of a Patient Experiencing Hypertension       Start:  04/08/25         Identify and Monitor Blood Pressure Elevation       Start:  04/08/25            Mutually Develop and Foster Achievement of Patient Goals       Start:  04/08/25            Facilitate Adherence to Lifestyle Change       Start:  04/08/25             Alleviate Barriers to Medication Regimen       Start:  04/08/25              Instructions   Patient was provided an electronic copy of care plan  CCM services were explained and offered and patient has accepted these services.  Patient has given their written consent to receive CCM services and understands that this includes the authorization of electronic communication of medical information with the other treating providers.  Patient understands that they may stop CCM services at any time and these changes will be effective at the end of the calendar month and will effectively revocate the agreement of CCM services.  Patient understands that only one practitioner can furnish and be paid for CCM services during one calendar month.  Patient also understands that there may be co-payment or deductible fees in association with CCM services.  Patient will continue with at least monthly follow-up calls with the Ambulatory .    Carol THORNTON  Ambulatory Case Management    5/1/2025, 08:25 EDT

## 2025-05-07 ENCOUNTER — APPOINTMENT (OUTPATIENT)
Dept: GENERAL RADIOLOGY | Facility: HOSPITAL | Age: 85
DRG: 292 | End: 2025-05-07
Payer: MEDICARE

## 2025-05-07 ENCOUNTER — HOSPITAL ENCOUNTER (INPATIENT)
Facility: HOSPITAL | Age: 85
LOS: 8 days | Discharge: NURSING, REHAB OR LONG-TERM CARE | DRG: 292 | End: 2025-05-16
Attending: EMERGENCY MEDICINE | Admitting: INTERNAL MEDICINE
Payer: MEDICARE

## 2025-05-07 ENCOUNTER — TELEPHONE (OUTPATIENT)
Dept: FAMILY MEDICINE CLINIC | Facility: CLINIC | Age: 85
End: 2025-05-07
Payer: MEDICARE

## 2025-05-07 ENCOUNTER — PATIENT OUTREACH (OUTPATIENT)
Dept: CASE MANAGEMENT | Facility: OTHER | Age: 85
End: 2025-05-07
Payer: MEDICARE

## 2025-05-07 DIAGNOSIS — L02.416 CELLULITIS AND ABSCESS OF LEFT LOWER EXTREMITY: ICD-10-CM

## 2025-05-07 DIAGNOSIS — N18.9 ACUTE RENAL FAILURE SUPERIMPOSED ON CHRONIC KIDNEY DISEASE, UNSPECIFIED ACUTE RENAL FAILURE TYPE, UNSPECIFIED CKD STAGE: Primary | ICD-10-CM

## 2025-05-07 DIAGNOSIS — R60.0 PERIPHERAL EDEMA: ICD-10-CM

## 2025-05-07 DIAGNOSIS — R60.0 BILATERAL LOWER EXTREMITY EDEMA: ICD-10-CM

## 2025-05-07 DIAGNOSIS — N17.9 ACUTE RENAL FAILURE SUPERIMPOSED ON CHRONIC KIDNEY DISEASE, UNSPECIFIED ACUTE RENAL FAILURE TYPE, UNSPECIFIED CKD STAGE: Primary | ICD-10-CM

## 2025-05-07 DIAGNOSIS — I73.9 PVD (PERIPHERAL VASCULAR DISEASE) WITH CLAUDICATION: ICD-10-CM

## 2025-05-07 DIAGNOSIS — L03.116 CELLULITIS AND ABSCESS OF LEFT LOWER EXTREMITY: ICD-10-CM

## 2025-05-07 DIAGNOSIS — D64.9 CHRONIC ANEMIA: ICD-10-CM

## 2025-05-07 DIAGNOSIS — N39.45 CONTINUOUS LEAKAGE OF URINE: ICD-10-CM

## 2025-05-07 DIAGNOSIS — R32 INCONTINENCE OF URINE IN FEMALE: Primary | ICD-10-CM

## 2025-05-07 DIAGNOSIS — R53.1 GENERALIZED WEAKNESS: ICD-10-CM

## 2025-05-07 LAB
ANION GAP SERPL CALCULATED.3IONS-SCNC: 15.2 MMOL/L (ref 5–15)
ATMOSPHERIC PRESS: 749.9 MMHG
BACTERIA UR QL AUTO: ABNORMAL /HPF
BASE EXCESS BLDV CALC-SCNC: -9.1 MMOL/L (ref -2–2)
BASOPHILS # BLD AUTO: 0.06 10*3/MM3 (ref 0–0.2)
BASOPHILS NFR BLD AUTO: 0.6 % (ref 0–1.5)
BILIRUB UR QL STRIP: NEGATIVE
BUN SERPL-MCNC: 77 MG/DL (ref 8–23)
BUN/CREAT SERPL: 22.3 (ref 7–25)
CALCIUM SPEC-SCNC: 8.2 MG/DL (ref 8.6–10.5)
CHLORIDE SERPL-SCNC: 105 MMOL/L (ref 98–107)
CLARITY UR: ABNORMAL
CO2 SERPL-SCNC: 14.8 MMOL/L (ref 22–29)
COLOR UR: YELLOW
CREAT SERPL-MCNC: 3.46 MG/DL (ref 0.57–1)
DEPRECATED RDW RBC AUTO: 46.5 FL (ref 37–54)
DEVICE COMMENT: ABNORMAL
EGFRCR SERPLBLD CKD-EPI 2021: 12.6 ML/MIN/1.73
EOSINOPHIL # BLD AUTO: 0.34 10*3/MM3 (ref 0–0.4)
EOSINOPHIL NFR BLD AUTO: 3.2 % (ref 0.3–6.2)
ERYTHROCYTE [DISTWIDTH] IN BLOOD BY AUTOMATED COUNT: 15.5 % (ref 12.3–15.4)
GLUCOSE SERPL-MCNC: 253 MG/DL (ref 65–99)
GLUCOSE UR STRIP-MCNC: NEGATIVE MG/DL
HCO3 BLDV-SCNC: 17.8 MMOL/L (ref 22–28)
HCT VFR BLD AUTO: 26.2 % (ref 34–46.6)
HGB BLD-MCNC: 8.5 G/DL (ref 12–15.9)
HGB UR QL STRIP.AUTO: NEGATIVE
HOLD SPECIMEN: NORMAL
HOLD SPECIMEN: NORMAL
HYALINE CASTS UR QL AUTO: ABNORMAL /LPF
IMM GRANULOCYTES # BLD AUTO: 0.08 10*3/MM3 (ref 0–0.05)
IMM GRANULOCYTES NFR BLD AUTO: 0.7 % (ref 0–0.5)
INHALED O2 CONCENTRATION: 21 %
KETONES UR QL STRIP: NEGATIVE
LEUKOCYTE ESTERASE UR QL STRIP.AUTO: ABNORMAL
LYMPHOCYTES # BLD AUTO: 0.77 10*3/MM3 (ref 0.7–3.1)
LYMPHOCYTES NFR BLD AUTO: 7.2 % (ref 19.6–45.3)
MAGNESIUM SERPL-MCNC: 1.7 MG/DL (ref 1.6–2.4)
MCH RBC QN AUTO: 27.3 PG (ref 26.6–33)
MCHC RBC AUTO-ENTMCNC: 32.4 G/DL (ref 31.5–35.7)
MCV RBC AUTO: 84.2 FL (ref 79–97)
MODALITY: ABNORMAL
MONOCYTES # BLD AUTO: 0.79 10*3/MM3 (ref 0.1–0.9)
MONOCYTES NFR BLD AUTO: 7.4 % (ref 5–12)
NEUTROPHILS NFR BLD AUTO: 8.65 10*3/MM3 (ref 1.7–7)
NEUTROPHILS NFR BLD AUTO: 80.9 % (ref 42.7–76)
NITRITE UR QL STRIP: NEGATIVE
NOTIFIED WHO: ABNORMAL
NRBC BLD AUTO-RTO: 0 /100 WBC (ref 0–0.2)
NT-PROBNP SERPL-MCNC: 1014 PG/ML (ref 0–1800)
OSMOLALITY UR: 345 MOSM/KG
PCO2 BLDV: 42 MM HG (ref 41–51)
PH BLDV: 7.24 PH UNITS (ref 7.31–7.41)
PH UR STRIP.AUTO: 8 [PH] (ref 5–8)
PLATELET # BLD AUTO: 333 10*3/MM3 (ref 140–450)
PMV BLD AUTO: 9.8 FL (ref 6–12)
PO2 BLDV: 30.4 MM HG (ref 35–45)
POTASSIUM SERPL-SCNC: 4.5 MMOL/L (ref 3.5–5.2)
PROT UR QL STRIP: ABNORMAL
RBC # BLD AUTO: 3.11 10*6/MM3 (ref 3.77–5.28)
RBC # UR STRIP: ABNORMAL /HPF
REF LAB TEST METHOD: ABNORMAL
SAO2 % BLDCOV: 47.5 % (ref 45–75)
SODIUM SERPL-SCNC: 135 MMOL/L (ref 136–145)
SODIUM UR-SCNC: 66 MMOL/L
SP GR UR STRIP: 1.01 (ref 1–1.03)
SQUAMOUS #/AREA URNS HPF: ABNORMAL /HPF
TOTAL RATE: 18 BREATHS/MINUTE
UROBILINOGEN UR QL STRIP: ABNORMAL
WBC # UR STRIP: ABNORMAL /HPF
WBC NRBC COR # BLD AUTO: 10.69 10*3/MM3 (ref 3.4–10.8)
WHOLE BLOOD HOLD COAG: NORMAL

## 2025-05-07 PROCEDURE — 83735 ASSAY OF MAGNESIUM: CPT | Performed by: EMERGENCY MEDICINE

## 2025-05-07 PROCEDURE — 25010000002 HYDROMORPHONE PER 4 MG: Performed by: INTERNAL MEDICINE

## 2025-05-07 PROCEDURE — 84300 ASSAY OF URINE SODIUM: CPT | Performed by: HOSPITALIST

## 2025-05-07 PROCEDURE — 25010000002 FUROSEMIDE PER 20 MG: Performed by: INTERNAL MEDICINE

## 2025-05-07 PROCEDURE — 81001 URINALYSIS AUTO W/SCOPE: CPT | Performed by: HOSPITALIST

## 2025-05-07 PROCEDURE — 85025 COMPLETE CBC W/AUTO DIFF WBC: CPT | Performed by: EMERGENCY MEDICINE

## 2025-05-07 PROCEDURE — 83935 ASSAY OF URINE OSMOLALITY: CPT | Performed by: HOSPITALIST

## 2025-05-07 PROCEDURE — 71046 X-RAY EXAM CHEST 2 VIEWS: CPT

## 2025-05-07 PROCEDURE — 87481 CANDIDA DNA AMP PROBE: CPT | Performed by: INTERNAL MEDICINE

## 2025-05-07 PROCEDURE — G0378 HOSPITAL OBSERVATION PER HR: HCPCS

## 2025-05-07 PROCEDURE — 25010000002 HYDROMORPHONE PER 4 MG: Performed by: EMERGENCY MEDICINE

## 2025-05-07 PROCEDURE — 99285 EMERGENCY DEPT VISIT HI MDM: CPT

## 2025-05-07 PROCEDURE — 82803 BLOOD GASES ANY COMBINATION: CPT

## 2025-05-07 PROCEDURE — 25010000002 HYDROMORPHONE 1 MG/ML SOLUTION: Performed by: EMERGENCY MEDICINE

## 2025-05-07 PROCEDURE — 25010000002 CEFTRIAXONE PER 250 MG: Performed by: INTERNAL MEDICINE

## 2025-05-07 PROCEDURE — 83880 ASSAY OF NATRIURETIC PEPTIDE: CPT | Performed by: EMERGENCY MEDICINE

## 2025-05-07 PROCEDURE — 80048 BASIC METABOLIC PNL TOTAL CA: CPT | Performed by: EMERGENCY MEDICINE

## 2025-05-07 RX ORDER — FUROSEMIDE 10 MG/ML
40 INJECTION INTRAMUSCULAR; INTRAVENOUS
Status: DISCONTINUED | OUTPATIENT
Start: 2025-05-07 | End: 2025-05-07

## 2025-05-07 RX ORDER — BISACODYL 10 MG
10 SUPPOSITORY, RECTAL RECTAL DAILY PRN
Status: DISCONTINUED | OUTPATIENT
Start: 2025-05-07 | End: 2025-05-16 | Stop reason: HOSPADM

## 2025-05-07 RX ORDER — BISOPROLOL FUMARATE 5 MG/1
5 TABLET, FILM COATED ORAL DAILY
Status: DISCONTINUED | OUTPATIENT
Start: 2025-05-08 | End: 2025-05-13

## 2025-05-07 RX ORDER — ONDANSETRON 2 MG/ML
4 INJECTION INTRAMUSCULAR; INTRAVENOUS EVERY 6 HOURS PRN
Status: DISCONTINUED | OUTPATIENT
Start: 2025-05-07 | End: 2025-05-16 | Stop reason: HOSPADM

## 2025-05-07 RX ORDER — ATORVASTATIN CALCIUM 80 MG/1
80 TABLET, FILM COATED ORAL NIGHTLY
Status: DISCONTINUED | OUTPATIENT
Start: 2025-05-07 | End: 2025-05-16 | Stop reason: HOSPADM

## 2025-05-07 RX ORDER — ONDANSETRON 4 MG/1
4 TABLET, ORALLY DISINTEGRATING ORAL EVERY 6 HOURS PRN
Status: DISCONTINUED | OUTPATIENT
Start: 2025-05-07 | End: 2025-05-16 | Stop reason: HOSPADM

## 2025-05-07 RX ORDER — PANTOPRAZOLE SODIUM 40 MG/1
40 TABLET, DELAYED RELEASE ORAL
Status: DISCONTINUED | OUTPATIENT
Start: 2025-05-08 | End: 2025-05-16 | Stop reason: HOSPADM

## 2025-05-07 RX ORDER — FERROUS SULFATE 325(65) MG
325 TABLET ORAL EVERY OTHER DAY
Status: DISCONTINUED | OUTPATIENT
Start: 2025-05-07 | End: 2025-05-16 | Stop reason: HOSPADM

## 2025-05-07 RX ORDER — HYDROCODONE BITARTRATE AND ACETAMINOPHEN 7.5; 325 MG/1; MG/1
1 TABLET ORAL EVERY 6 HOURS PRN
Refills: 0 | Status: DISPENSED | OUTPATIENT
Start: 2025-05-07 | End: 2025-05-12

## 2025-05-07 RX ORDER — HYDROMORPHONE HYDROCHLORIDE 1 MG/ML
0.5 INJECTION, SOLUTION INTRAMUSCULAR; INTRAVENOUS; SUBCUTANEOUS EVERY 4 HOURS PRN
Status: DISCONTINUED | OUTPATIENT
Start: 2025-05-07 | End: 2025-05-16 | Stop reason: HOSPADM

## 2025-05-07 RX ORDER — AMOXICILLIN 250 MG
2 CAPSULE ORAL 2 TIMES DAILY PRN
Status: DISCONTINUED | OUTPATIENT
Start: 2025-05-07 | End: 2025-05-16 | Stop reason: HOSPADM

## 2025-05-07 RX ORDER — PREGABALIN 25 MG/1
25 CAPSULE ORAL 2 TIMES DAILY
Status: DISCONTINUED | OUTPATIENT
Start: 2025-05-07 | End: 2025-05-16 | Stop reason: HOSPADM

## 2025-05-07 RX ORDER — ACETAMINOPHEN 650 MG/1
650 SUPPOSITORY RECTAL EVERY 4 HOURS PRN
Status: DISCONTINUED | OUTPATIENT
Start: 2025-05-07 | End: 2025-05-16 | Stop reason: HOSPADM

## 2025-05-07 RX ORDER — FUROSEMIDE 10 MG/ML
80 INJECTION INTRAMUSCULAR; INTRAVENOUS
Status: DISCONTINUED | OUTPATIENT
Start: 2025-05-07 | End: 2025-05-15

## 2025-05-07 RX ORDER — BISACODYL 5 MG/1
5 TABLET, DELAYED RELEASE ORAL DAILY PRN
Status: DISCONTINUED | OUTPATIENT
Start: 2025-05-07 | End: 2025-05-16 | Stop reason: HOSPADM

## 2025-05-07 RX ORDER — ACETAMINOPHEN 160 MG/5ML
650 SOLUTION ORAL EVERY 4 HOURS PRN
Status: DISCONTINUED | OUTPATIENT
Start: 2025-05-07 | End: 2025-05-16 | Stop reason: HOSPADM

## 2025-05-07 RX ORDER — ACETAMINOPHEN 325 MG/1
650 TABLET ORAL EVERY 4 HOURS PRN
Status: DISCONTINUED | OUTPATIENT
Start: 2025-05-07 | End: 2025-05-16 | Stop reason: HOSPADM

## 2025-05-07 RX ORDER — HYDRALAZINE HYDROCHLORIDE 50 MG/1
50 TABLET, FILM COATED ORAL EVERY 8 HOURS SCHEDULED
Status: DISCONTINUED | OUTPATIENT
Start: 2025-05-07 | End: 2025-05-11

## 2025-05-07 RX ORDER — POLYETHYLENE GLYCOL 3350 17 G/17G
17 POWDER, FOR SOLUTION ORAL DAILY PRN
Status: DISCONTINUED | OUTPATIENT
Start: 2025-05-07 | End: 2025-05-16 | Stop reason: HOSPADM

## 2025-05-07 RX ORDER — AMLODIPINE BESYLATE 5 MG/1
5 TABLET ORAL
Status: DISCONTINUED | OUTPATIENT
Start: 2025-05-08 | End: 2025-05-15

## 2025-05-07 RX ORDER — LEVOTHYROXINE SODIUM 137 UG/1
137 TABLET ORAL
Status: DISCONTINUED | OUTPATIENT
Start: 2025-05-08 | End: 2025-05-14

## 2025-05-07 RX ORDER — HYDROMORPHONE HYDROCHLORIDE 1 MG/ML
0.5 INJECTION, SOLUTION INTRAMUSCULAR; INTRAVENOUS; SUBCUTANEOUS ONCE
Refills: 0 | Status: COMPLETED | OUTPATIENT
Start: 2025-05-07 | End: 2025-05-07

## 2025-05-07 RX ADMIN — FUROSEMIDE 80 MG: 10 INJECTION, SOLUTION INTRAMUSCULAR; INTRAVENOUS at 23:24

## 2025-05-07 RX ADMIN — HYDROMORPHONE HYDROCHLORIDE 1 MG: 1 INJECTION, SOLUTION INTRAMUSCULAR; INTRAVENOUS; SUBCUTANEOUS at 18:34

## 2025-05-07 RX ADMIN — HYDROMORPHONE HYDROCHLORIDE 0.5 MG: 1 INJECTION, SOLUTION INTRAMUSCULAR; INTRAVENOUS; SUBCUTANEOUS at 23:23

## 2025-05-07 RX ADMIN — FERROUS SULFATE TAB 325 MG (65 MG ELEMENTAL FE) 325 MG: 325 (65 FE) TAB at 23:24

## 2025-05-07 RX ADMIN — CEFTRIAXONE 2000 MG: 2 INJECTION, POWDER, FOR SOLUTION INTRAMUSCULAR; INTRAVENOUS at 23:25

## 2025-05-07 RX ADMIN — HYDROMORPHONE HYDROCHLORIDE 0.5 MG: 1 INJECTION, SOLUTION INTRAMUSCULAR; INTRAVENOUS; SUBCUTANEOUS at 22:34

## 2025-05-07 RX ADMIN — ATORVASTATIN CALCIUM 80 MG: 80 TABLET, FILM COATED ORAL at 23:24

## 2025-05-07 RX ADMIN — HYDRALAZINE HYDROCHLORIDE 50 MG: 50 TABLET ORAL at 23:24

## 2025-05-07 RX ADMIN — PREGABALIN 25 MG: 25 CAPSULE ORAL at 23:31

## 2025-05-07 NOTE — PROGRESS NOTES
Chart reviewed:  84-year-old female known to have history of chronic kidney disease stage IV with baseline creatinine around 2.5 in the setting of poorly controlled diabetes, history of bilateral lower extremity edema who presented to the hospital with bilateral lower extremity edema and pain.  Per ER record denied any shortness of breath  Labs showed a creatinine of 3.46 up from 2.6 on 4/17/2025.  proBNP of 1000, sodium 135, bicarbonate 14.8.    Plan:  Hold amlodipine as the patient has bilateral extremity edema  Check chest x-ray.  Check VBGs given metabolic acidosis  Check urine sodium, urine osmolality  Hold on further diuresis till we review the chest x-ray  Check uric acid as surrogate marker of intravascular volume status  For consult to follow tomorrow      Brendon Aguilar MD  [facility]

## 2025-05-07 NOTE — ED NOTES
"Nursing report ED to floor  Halie Guidry  84 y.o.  female    HPI :  HPI  Stated Reason for Visit: leg swelling  History Obtained From: EMS    Chief Complaint  Chief Complaint   Patient presents with    Leg Swelling       Admitting doctor:   Kim Barnard MD    Admitting diagnosis:   The primary encounter diagnosis was Acute renal failure superimposed on chronic kidney disease, unspecified acute renal failure type, unspecified CKD stage. Diagnoses of Peripheral edema and Chronic anemia were also pertinent to this visit.    Code status:   Current Code Status       Date Active Code Status Order ID Comments User Context       5/7/2025 1838 CPR (Attempt to Resuscitate) [id number]  Kim Barnard MD ED        Question Answer    Code Status (Patient has no pulse and is not breathing) CPR (Attempt to Resuscitate)    Medical Interventions (Patient has pulse or is breathing) Full                    Allergies:   Sulfa antibiotics    Isolation:   Contact    Intake and Output  No intake or output data in the 24 hours ending 05/07/25 1851    Weight:       05/07/25  1613   Weight: 97.1 kg (214 lb 1.1 oz)       Most recent vitals:   Vitals:    05/07/25 1613 05/07/25 1831   BP: 158/71    Pulse: 79 77   Resp: 18    Temp: 97.4 °F (36.3 °C)    TempSrc: Oral    SpO2: 95% 97%   Weight: 97.1 kg (214 lb 1.1 oz)    Height: 172.7 cm (67.99\")        Active LDAs/IV Access:   Lines, Drains & Airways       Active LDAs       Name Placement date Placement time Site Days    Peripheral IV 05/07/25 1712 20 G Anterior;Left;Upper Arm 05/07/25  1712  Arm  less than 1    External Urinary Catheter 05/07/25  1714  --  less than 1                    Labs (abnormal labs have a star):   Labs Reviewed   BASIC METABOLIC PANEL - Abnormal; Notable for the following components:       Result Value    Glucose 253 (*)     BUN 77 (*)     Creatinine 3.46 (*)     Sodium 135 (*)     CO2 14.8 (*)     Calcium 8.2 (*)     Anion Gap 15.2 (*)     eGFR " 12.6 (*)     All other components within normal limits    Narrative:     GFR Categories in Chronic Kidney Disease (CKD)              GFR Category          GFR (mL/min/1.73)    Interpretation  G1                    90 or greater        Normal or high (1)  G2                    60-89                Mild decrease (1)  G3a                   45-59                Mild to moderate decrease  G3b                   30-44                Moderate to severe decrease  G4                    15-29                Severe decrease  G5                    14 or less           Kidney failure    (1)In the absence of evidence of kidney disease, neither GFR category G1 or G2 fulfill the criteria for CKD.    eGFR calculation 2021 CKD-EPI creatinine equation, which does not include race as a factor   CBC WITH AUTO DIFFERENTIAL - Abnormal; Notable for the following components:    RBC 3.11 (*)     Hemoglobin 8.5 (*)     Hematocrit 26.2 (*)     RDW 15.5 (*)     Neutrophil % 80.9 (*)     Lymphocyte % 7.2 (*)     Immature Grans % 0.7 (*)     Neutrophils, Absolute 8.65 (*)     Immature Grans, Absolute 0.08 (*)     All other components within normal limits   BNP (IN-HOUSE) - Normal    Narrative:     This assay is used as an aid in the diagnosis of individuals suspected of having heart failure. It can be used as an aid in the diagnosis of acute decompensated heart failure (ADHF) in patients presenting with signs and symptoms of ADHF to the emergency department (ED). In addition, NT-proBNP of <300 pg/mL indicates ADHF is not likely.    Age Range Result Interpretation  NT-proBNP Concentration (pg/mL:      <50             Positive            >450                   Gray                 300-450                    Negative             <300    50-75           Positive            >900                  Gray                300-900                  Negative            <300      >75             Positive            >1800                  Johnson                 300-1800                  Negative            <300   MAGNESIUM - Normal   RAINBOW DRAW    Narrative:     The following orders were created for panel order [place] Draw.  Procedure                               Abnormality         Status                     ---------                               -----------         ------                     Gold Top - SST[[id number]]                                   Final result               Gray Top[[id number]]                                         Final result               Light Blue Top[[id number]]                                   Final result                 Please view results for these tests on the individual orders.   CBC AND DIFFERENTIAL    Narrative:     The following orders were created for panel order CBC & Differential.  Procedure                               Abnormality         Status                     ---------                               -----------         ------                     CBC Auto Differential[[id number]]        Abnormal            Final result                 Please view results for these tests on the individual orders.   GOLD TOP - [facility]   GRAY TOP   LIGHT BLUE TOP       EKG:   No orders to display       Meds given in ED:   Medications   acetaminophen (TYLENOL) tablet 650 mg (has no administration in time range)     Or   acetaminophen (TYLENOL) 160 MG/5ML oral solution 650 mg (has no administration in time range)     Or   acetaminophen (TYLENOL) suppository 650 mg (has no administration in time range)   ondansetron ODT (ZOFRAN-ODT) disintegrating tablet 4 mg (has no administration in time range)     Or   ondansetron (ZOFRAN) injection 4 mg (has no administration in time range)   melatonin tablet 2.5 mg (has no administration in time range)   sennosides-docusate (PERICOLACE) 8.6-50 MG per tablet 2 tablet (has no administration in time range)     And   polyethylene glycol (MIRALAX) packet 17 g (has no administration in time range)     And   bisacodyl  (DULCOLAX) EC tablet 5 mg (has no administration in time range)     And   bisacodyl (DULCOLAX) suppository 10 mg (has no administration in time range)   HYDROmorphone (DILAUDID) injection 1 mg (1 mg Intravenous Given 25 145)       Imaging results:  No radiology results for the last day    Ambulatory status:   - assist     Social issues:   Social History     Socioeconomic History    Marital status:     Number of children: 3   Tobacco Use    Smoking status: Former     Current packs/day: 0.00     Average packs/day: 1 pack/day for 40.0 years (40.0 ttl pk-yrs)     Types: Cigarettes     Start date:      Quit date:      Years since quittin.3     Passive exposure: Past    Smokeless tobacco: Never   Vaping Use    Vaping status: Never Used   Substance and Sexual Activity    Alcohol use: No    Drug use: No    Sexual activity: Defer       Peripheral Neurovascular  Peripheral Neurovascular (Adult)  Peripheral Neurovascular WDL: .WDL except, neurovascular assessment lower  LLE Neurovascular Assessment  Temperature LLE: hot  Color LLE: red  Sensation LLE: tenderness present  RLE Neurovascular Assessment  Temperature RLE: hot  Color RLE: red  Sensation RLE: tenderness present    Neuro Cognitive  Neuro Cognitive (Adult)  Cognitive/Neuro/Behavioral WDL: WDL    Learning  Learning Assessment  Learning Readiness and Ability: no barriers identified    Respiratory  Respiratory WDL  Respiratory WDL: WDL    Abdominal Pain       Pain Assessments  Pain (Adult)  (0-10) Pain Rating: Rest: 7  (0-10) Pain Rating: Activity: 7  Pain Location:  (bilat legs)    NIH Stroke Scale       Ainka Jimenes RN  25 18:51 EDT

## 2025-05-07 NOTE — OUTREACH NOTE
AMBULATORY CASE MANAGEMENT NOTE    Names and Relationships of Patient/Support Persons: Contact: Halie Guidry; Relationship: Self  Contact: Anitha; Relationship: Other -     [facility] Interim Update    Spoke with patient and HH nurse today.    Updated both regarding female external catheter system order.  Was able to obtain notes from both PCP, and HH therapy regarding patient inability to transfer independently as well as patients incontinence which increases patients risk for falls.     Awaiting return contact from Ms. Thomas regarding review of order.    Next outreach has been scheduled. No further needs at this time.        Education Documentation  No documentation found.        Carol THORNTON  Ambulatory Case Management    5/7/2025, 13:15 EDT

## 2025-05-07 NOTE — NURSING NOTE
"Nursing report ED to floor  Halie Guidry  84 y.o.  female    HPI :  HPI  Stated Reason for Visit: leg swelling  History Obtained From: EMS    Chief Complaint  Chief Complaint   Patient presents with    Leg Swelling       Admitting doctor:   Kim Barnard MD    Admitting diagnosis:   The primary encounter diagnosis was Acute renal failure superimposed on chronic kidney disease, unspecified acute renal failure type, unspecified CKD stage. Diagnoses of Peripheral edema and Chronic anemia were also pertinent to this visit.    Code status:   Current Code Status       Date Active Code Status Order ID Comments User Context       5/7/2025 1838 CPR (Attempt to Resuscitate) [id number]  Kim Barnard MD ED        Question Answer    Code Status (Patient has no pulse and is not breathing) CPR (Attempt to Resuscitate)    Medical Interventions (Patient has pulse or is breathing) Full                    Allergies:   Sulfa antibiotics    Isolation:   Contact    Intake and Output  No intake or output data in the 24 hours ending 05/07/25 1847    Weight:       05/07/25  1613   Weight: 97.1 kg (214 lb 1.1 oz)       Most recent vitals:   Vitals:    05/07/25 1613 05/07/25 1831   BP: 158/71    Pulse: 79 77   Resp: 18    Temp: 97.4 °F (36.3 °C)    TempSrc: Oral    SpO2: 95% 97%   Weight: 97.1 kg (214 lb 1.1 oz)    Height: 172.7 cm (67.99\")        Active LDAs/IV Access:   Lines, Drains & Airways       Active LDAs       Name Placement date Placement time Site Days    Peripheral IV 05/07/25 1712 20 G Anterior;Left;Upper Arm 05/07/25  1712  Arm  less than 1    External Urinary Catheter 05/07/25  1714  --  less than 1                    Labs (abnormal labs have a star):   Labs Reviewed   BASIC METABOLIC PANEL - Abnormal; Notable for the following components:       Result Value    Glucose 253 (*)     BUN 77 (*)     Creatinine 3.46 (*)     Sodium 135 (*)     CO2 14.8 (*)     Calcium 8.2 (*)     Anion Gap 15.2 (*)     eGFR " 12.6 (*)     All other components within normal limits    Narrative:     GFR Categories in Chronic Kidney Disease (CKD)              GFR Category          GFR (mL/min/1.73)    Interpretation  G1                    90 or greater        Normal or high (1)  G2                    60-89                Mild decrease (1)  G3a                   45-59                Mild to moderate decrease  G3b                   30-44                Moderate to severe decrease  G4                    15-29                Severe decrease  G5                    14 or less           Kidney failure    (1)In the absence of evidence of kidney disease, neither GFR category G1 or G2 fulfill the criteria for CKD.    eGFR calculation 2021 CKD-EPI creatinine equation, which does not include race as a factor   CBC WITH AUTO DIFFERENTIAL - Abnormal; Notable for the following components:    RBC 3.11 (*)     Hemoglobin 8.5 (*)     Hematocrit 26.2 (*)     RDW 15.5 (*)     Neutrophil % 80.9 (*)     Lymphocyte % 7.2 (*)     Immature Grans % 0.7 (*)     Neutrophils, Absolute 8.65 (*)     Immature Grans, Absolute 0.08 (*)     All other components within normal limits   BNP (IN-HOUSE) - Normal    Narrative:     This assay is used as an aid in the diagnosis of individuals suspected of having heart failure. It can be used as an aid in the diagnosis of acute decompensated heart failure (ADHF) in patients presenting with signs and symptoms of ADHF to the emergency department (ED). In addition, NT-proBNP of <300 pg/mL indicates ADHF is not likely.    Age Range Result Interpretation  NT-proBNP Concentration (pg/mL:      <50             Positive            >450                   Gray                 300-450                    Negative             <300    50-75           Positive            >900                  Gray                300-900                  Negative            <300      >75             Positive            >1800                  Johnson                 300-1800                  Negative            <300   MAGNESIUM - Normal   RAINBOW DRAW    Narrative:     The following orders were created for panel order [place] Draw.  Procedure                               Abnormality         Status                     ---------                               -----------         ------                     Gold Top - SST[[id number]]                                   Final result               Gray Top[[id number]]                                         Final result               Light Blue Top[[id number]]                                   Final result                 Please view results for these tests on the individual orders.   CBC AND DIFFERENTIAL    Narrative:     The following orders were created for panel order CBC & Differential.  Procedure                               Abnormality         Status                     ---------                               -----------         ------                     CBC Auto Differential[[id number]]        Abnormal            Final result                 Please view results for these tests on the individual orders.   GOLD TOP - [facility]   GRAY TOP   LIGHT BLUE TOP       EKG:   No orders to display       Meds given in ED:   Medications   acetaminophen (TYLENOL) tablet 650 mg (has no administration in time range)     Or   acetaminophen (TYLENOL) 160 MG/5ML oral solution 650 mg (has no administration in time range)     Or   acetaminophen (TYLENOL) suppository 650 mg (has no administration in time range)   ondansetron ODT (ZOFRAN-ODT) disintegrating tablet 4 mg (has no administration in time range)     Or   ondansetron (ZOFRAN) injection 4 mg (has no administration in time range)   melatonin tablet 2.5 mg (has no administration in time range)   sennosides-docusate (PERICOLACE) 8.6-50 MG per tablet 2 tablet (has no administration in time range)     And   polyethylene glycol (MIRALAX) packet 17 g (has no administration in time range)     And   bisacodyl  (DULCOLAX) EC tablet 5 mg (has no administration in time range)     And   bisacodyl (DULCOLAX) suppository 10 mg (has no administration in time range)   HYDROmorphone (DILAUDID) injection 1 mg (1 mg Intravenous Given 25 037)       Imaging results:  No radiology results for the last day    Ambulatory status:   - br    Social issues:   Social History     Socioeconomic History    Marital status:     Number of children: 3   Tobacco Use    Smoking status: Former     Current packs/day: 0.00     Average packs/day: 1 pack/day for 40.0 years (40.0 ttl pk-yrs)     Types: Cigarettes     Start date:      Quit date:      Years since quittin.3     Passive exposure: Past    Smokeless tobacco: Never   Vaping Use    Vaping status: Never Used   Substance and Sexual Activity    Alcohol use: No    Drug use: No    Sexual activity: Defer       Peripheral Neurovascular  Peripheral Neurovascular (Adult)  Peripheral Neurovascular WDL: .WDL except, neurovascular assessment lower  LLE Neurovascular Assessment  Temperature LLE: hot  Color LLE: red  Sensation LLE: tenderness present  RLE Neurovascular Assessment  Temperature RLE: hot  Color RLE: red  Sensation RLE: tenderness present    Neuro Cognitive  Neuro Cognitive (Adult)  Cognitive/Neuro/Behavioral WDL: WDL    Learning  Learning Assessment  Learning Readiness and Ability: no barriers identified    Respiratory  Respiratory WDL  Respiratory WDL: WDL    Abdominal Pain       Pain Assessments  Pain (Adult)  (0-10) Pain Rating: Rest: 7  (0-10) Pain Rating: Activity: 7  Pain Location:  (bilat legs)    NIH Stroke Scale       Lit Llamas RN  25 18:47 EDT

## 2025-05-07 NOTE — PLAN OF CARE
Problem: Fall Risk  Goal: Prevent Falls and Injury  Outcome: Progressing  Goal: Optimal Care Coordination of a Patient Experiencing Fall Risk  Outcome: Progressing     Problem: Hypertension  Goal: Make Healthy Diet Choices  Outcome: Progressing  Goal: Track and Manage My Blood Pressure  Outcome: Progressing  Goal: [facility]  Outcome: Progressing  Goal: Optimal Care Coordination of a Patient Experiencing Hypertension  Outcome: Progressing

## 2025-05-07 NOTE — ED PROVIDER NOTES
EMERGENCY DEPARTMENT ENCOUNTER    Room Number:  13/13  PCP: Rossy Laird APRN  Historian: Patient      HPI:  Chief Complaint: Leg pain  A complete HPI/ROS/PMH/PSH/SH/FH are unobtainable due to: None    Context: Halie Guidry is a 84 y.o. female who presents to the ED via [facility] from [facility] c/o acute bilateral lower extremity swelling and pain.  Patient states that she always has pain other due to peripheral vascular disease but has really been worse recently.  Tylenol not helping.  Does take Demadex.  Denies any significant shortness of breath.      MEDICAL RECORD REVIEW    External (non-ED) record review: Reviewed epic shows an adult transthoracic echo August 26, 2024 with an ejection fraction of 61.8%              PAST MEDICAL HISTORY  Active Ambulatory Problems     Diagnosis Date Noted    Compression fracture 04/22/2009    Lumbar degenerative disc disease 07/05/2012    Type 2 diabetes mellitus with hyperglycemia, with long-term current use of insulin     Hyperlipidemia     Benign essential hypertension 08/20/2014    Primary hypothyroidism     Neuropathy     Osteoporosis 09/09/2015    Proteinuria 02/28/2012    Tobacco abuse 08/22/2013    Generalized weakness 05/27/2017    Spinal stenosis 05/27/2017    Scoliosis 05/27/2017    Arthritis 05/27/2017    VBI (vertebrobasilar insufficiency) 05/28/2017    Orthostatic hypotension 05/28/2017    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 05/28/2017    Severe hypothyroidism 05/30/2017    Noncompliance with medication regimen 05/30/2017    Vitamin D deficiency disease 06/01/2017    Tremor 01/09/2018    Seizure 05/21/2019    Thoracic degenerative disc disease 01/27/2020    Acute kidney injury (VIPUL) with acute tubular necrosis (ATN) 12/02/2021    Hyperglycemia 12/02/2021    Type 2 diabetes mellitus with hyperglycemia 12/02/2021    Lower abdominal pain 02/23/2022    Transaminitis 02/23/2022    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     Choledocholithiasis 02/23/2022    Hypokalemia 02/24/2022    Hypoxia 02/24/2022    Generalized abdominal pain 03/26/2022    History of Clostridium difficile infection 03/26/2022    History of ERCP 03/26/2022    Hypomagnesemia 03/28/2022    Anxiety disorder 05/27/2022    Neuropathic pain 05/27/2022    Intertrigo 05/27/2022    Weakness of both lower extremities 06/24/2022    Accelerated hypertension 09/01/2022    Acute cystitis without hematuria 09/06/2022    Left lower lobe pneumonia 11/04/2022    Cellulitis and abscess of left lower extremity 05/15/2023    Benign hypertension with CKD (chronic kidney disease) stage IV 06/02/2023    Peripheral edema 07/30/2023    Primary malignant neuroendocrine tumor of pancreas 08/24/2023    Encounter for long-term (current) use of high-risk medication 08/28/2023    Diabetic foot ulcer 12/24/2023    CKD (chronic kidney disease) stage 4, GFR 15-29 ml/min 01/28/2024    Pressure injury of buttock, stage 2 01/28/2024    HTN (hypertension) 04/21/2024    Anemia due to chronic kidney disease 05/17/2024    Bilateral lower extremity edema 08/24/2024    Cellulitis of right lower extremity 08/24/2024    CKD (chronic kidney disease) stage 4, GFR 15-29 ml/min 08/24/2024    Acute CHF 08/24/2024    Bilateral cellulitis of lower leg 08/25/2024    Acute UTI 10/10/2024    UTI (urinary tract infection) 10/10/2024    Acute UTI (urinary tract infection) 10/11/2024    Metabolic acidosis 10/11/2024    Cobalamin deficiency 11/30/2022    Dependent edema 11/30/2022    Gastroesophageal reflux disease 11/30/2022    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    Pancreatic neoplasm 08/17/2023    Congestive heart failure 11/30/2022    CHF exacerbation 11/05/2024    Acute exacerbation of CHF (congestive heart failure) 11/05/2024    Hypertensive urgency 02/17/2025     Resolved Ambulatory Problems     Diagnosis Date Noted    Leukocytosis     Diabetic eye exam 02/12/2014    Altered mental status 12/15/2017    Stage 3a  chronic kidney disease 03/09/2012    Metabolic encephalopathy 12/02/2021    COVID-19 virus detected 02/23/2022    UTI (urinary tract infection) 02/23/2022    Acute UTI (urinary tract infection) 03/27/2022    Burning pain 05/27/2022    Acute metabolic encephalopathy 06/22/2022    Hypoglycemia 06/23/2022    Acute metabolic encephalopathy 06/24/2022    Altered mental status, unspecified altered mental status type 11/04/2022    Diarrhea 11/04/2022    Acute pain of left foot 05/13/2023    Bilateral leg pain 07/21/2023    Acute renal insufficiency 01/09/2024    COVID-19 01/10/2024    Cytokine release syndrome, grade 1 01/15/2024    C. difficile diarrhea 01/15/2024    Sepsis 01/27/2024    Metabolic encephalopathy 01/28/2024    Uncontrolled diabetes mellitus with hyperglycemia 04/20/2024    Pseudohyponatremia 04/21/2024     Past Medical History:   Diagnosis Date    Anxiety     Bleeding disorder     Depression     Diabetes     Diabetes mellitus, type 2     Disc degeneration, lumbar     Headache, tension-type     Hypothyroidism     Pancreatic mass     Peripheral neuropathy     Rotator cuff tear, left     Shoulder pain          PAST SURGICAL HISTORY  Past Surgical History:   Procedure Laterality Date    APPENDECTOMY      BILATERAL BREAST REDUCTION Bilateral 08/2015    CATARACT EXTRACTION  03/2015    CHOLECYSTECTOMY WITH INTRAOPERATIVE CHOLANGIOGRAM N/A 3/27/2022    Procedure: CHOLECYSTECTOMY LAPAROSCOPIC INTRAOPERATIVE CHOLANGIOGRAM;  Surgeon: Aiyana Hill MD;  Location: [facility] MAIN OR;  Service: General;  Laterality: N/A;    COLONOSCOPY  06/05/2015    WNL    ERCP N/A 2/25/2022    Procedure: ENDOSCOPIC RETROGRADE CHOLANGIOPANCREATOGRAPHY with sphincterotomy and balloon sweep;  Surgeon: Chilo Wilhelm MD;  Location: [facility] ENDOSCOPY;  Service: Gastroenterology;  Laterality: N/A;  PRE/POST - CBD stones    ERCP N/A 3/28/2022    Procedure: ENDOSCOPIC RETROGRADE CHOLANGIOPANCREATOGRAPHY WITH SPHINCTEROTOMY AND BALLOON  SWEEP;  Surgeon: Chilo Wilhelm MD;  Location: [facility] ENDOSCOPY;  Service: Gastroenterology;  Laterality: N/A;  PRE: COMMON DUCT STONE  POST: COMMON DUCT STONE    KYPHOPLASTY      REDUCTION MAMMAPLASTY      TONSILLECTOMY           FAMILY HISTORY  Family History   Problem Relation Age of Onset    Bone cancer Mother     No Known Problems Father     Heart disease Daughter          SOCIAL HISTORY  Social History     Socioeconomic History    Marital status:     Number of children: 3   Tobacco Use    Smoking status: Former     Current packs/day: 0.00     Average packs/day: 1 pack/day for 40.0 years (40.0 ttl pk-yrs)     Types: Cigarettes     Start date:      Quit date:      Years since quittin.3     Passive exposure: Past    Smokeless tobacco: Never   Vaping Use    Vaping status: Never Used   Substance and Sexual Activity    Alcohol use: No    Drug use: No    Sexual activity: Defer         ALLERGIES  Sulfa antibiotics        REVIEW OF SYSTEMS  Review of Systems     All systems reviewed and negative except for those discussed in HPI.       PHYSICAL EXAM    I have reviewed the triage vital signs and nursing notes.    ED Triage Vitals [25 1613]   Temp Heart Rate Resp BP SpO2   97.4 °F (36.3 °C) 79 18 158/71 95 %      Temp src Heart Rate Source Patient Position BP Location FiO2 (%)   Oral -- -- -- --       Physical Exam  General: Appears mildly uncomfortable, nontoxic, nondiaphoretic  HEENT: Mucous membranes moist, atraumatic, EOMI  Neck: Full ROM  Pulm: Symmetric chest rise, nonlabored, lungs CTAB  Cardiovascular: Regular rate and rhythm, intact distal pulses, bilateral lower extremity edema with overlying distal changes at the dorsal toes consistent with peripheral vascular disease  GI: Soft, nontender, nondistended, no rebound, no guarding, bowel sounds present  MSK: Full ROM, no deformity  Skin: Warm, dry  Neuro: Awake, alert, oriented x 4, GCS 15, moving all extremities, no focal  deficits  Psych: Calm, cooperative        LAB RESULTS  Recent Results (from the past 24 hours)   Basic Metabolic Panel    Collection Time: 05/07/25  5:06 PM    Specimen: Blood   Result Value Ref Range    Glucose 253 (H) 65 - 99 mg/dL    BUN 77 (H) 8 - 23 mg/dL    Creatinine 3.46 (H) 0.57 - 1.00 mg/dL    Sodium 135 (L) 136 - 145 mmol/L    Potassium 4.5 3.5 - 5.2 mmol/L    Chloride 105 98 - 107 mmol/L    CO2 14.8 (L) 22.0 - 29.0 mmol/L    Calcium 8.2 (L) 8.6 - 10.5 mg/dL    BUN/Creatinine Ratio 22.3 7.0 - 25.0    Anion Gap 15.2 (H) 5.0 - 15.0 mmol/L    eGFR 12.6 (L) >60.0 mL/min/1.73   BNP    Collection Time: 05/07/25  5:06 PM    Specimen: Blood   Result Value Ref Range    proBNP 1,014.0 0.0 - 1,800.0 pg/mL   Magnesium    Collection Time: 05/07/25  5:06 PM    Specimen: Blood   Result Value Ref Range    Magnesium 1.7 1.6 - 2.4 mg/dL   CBC Auto Differential    Collection Time: 05/07/25  5:06 PM    Specimen: Blood   Result Value Ref Range    WBC 10.69 3.40 - 10.80 10*3/mm3    RBC 3.11 (L) 3.77 - 5.28 10*6/mm3    Hemoglobin 8.5 (L) 12.0 - 15.9 g/dL    Hematocrit 26.2 (L) 34.0 - 46.6 %    MCV 84.2 79.0 - 97.0 fL    MCH 27.3 26.6 - 33.0 pg    MCHC 32.4 31.5 - 35.7 g/dL    RDW 15.5 (H) 12.3 - 15.4 %    RDW-SD 46.5 37.0 - 54.0 fl    MPV 9.8 6.0 - 12.0 fL    Platelets 333 140 - 450 10*3/mm3    Neutrophil % 80.9 (H) 42.7 - 76.0 %    Lymphocyte % 7.2 (L) 19.6 - 45.3 %    Monocyte % 7.4 5.0 - 12.0 %    Eosinophil % 3.2 0.3 - 6.2 %    Basophil % 0.6 0.0 - 1.5 %    Immature Grans % 0.7 (H) 0.0 - 0.5 %    Neutrophils, Absolute 8.65 (H) 1.70 - 7.00 10*3/mm3    Lymphocytes, Absolute 0.77 0.70 - 3.10 10*3/mm3    Monocytes, Absolute 0.79 0.10 - 0.90 10*3/mm3    Eosinophils, Absolute 0.34 0.00 - 0.40 10*3/mm3    Basophils, Absolute 0.06 0.00 - 0.20 10*3/mm3    Immature Grans, Absolute 0.08 (H) 0.00 - 0.05 10*3/mm3    nRBC 0.0 0.0 - 0.2 /100 WBC   [facility]    Collection Time: 05/07/25  5:06 PM   Result Value Ref Range    Extra Tube  Hold for add-ons.    Gray Top    Collection Time: 05/07/25  5:06 PM   Result Value Ref Range    Extra Tube Hold for add-ons.    Light Blue Top    Collection Time: 05/07/25  5:06 PM   Result Value Ref Range    Extra Tube Hold for add-ons.        Ordered the above labs and independently interpreted results. My findings will be discussed in the medical decision making section below        RADIOLOGY  No Radiology Exams Resulted Within Past 24 Hours    Ordered the above noted radiological studies.  Independently interpreted by me and my independent review of findings can be found in the ED Course.  See dictation for official radiology interpretation.      PROCEDURES    Procedures        MEDICATIONS GIVEN IN ER    Medications   HYDROmorphone (DILAUDID) injection 1 mg (has no administration in time range)         PROGRESS, DATA ANALYSIS, CONSULTS, AND MEDICAL DECISION MAKING    Please note that this section constitutes my independent interpretation of clinical data including lab results, radiology, EKG's.  This constitutes my independent professional opinion regarding differential diagnosis and management of this patient.  It may include any factors such as history from outside sources, review of external records, social determinants of health, management of medications, response to those treatments, and discussions with other providers.    Differential Diagnosis and Plan: Plan for labs, supportive care, reevaluate with concerns for possible acute on chronic renal insufficiency, heart failure, electrolyte imbalance, infection, among others.    Additional sources:  - Discussed/ obtained information from independent historians:       - (Social Determinants of Health): None     - Shared decision making:  Patient fully updated on and in agreement with the course and plan moving forward    ED Course as of 05/07/25 1823   Wed May 07, 2025   1732 WBC: 10.69 [DC]   1732 Hemoglobin(!): 8.5  Stable, chronic [DC]   1744 Magnesium:  1.7 [DC]   1744 proBNP: 1,014.0 [DC]   1744 Glucose(!): 253 [DC]   1744 BUN(!): 77 [DC]   1744 Creatinine(!): 3.46  2.6 two weeks ago [DC]   1744 Sodium(!): 135 [DC]   1744 Potassium: 4.5 [DC]   1744 CO2(!): 14.8 [DC]   1753 Discussed with Dr. Barnard, LHA, discussed patient's clinical course and findings today, treatment modalities, pending Nephrology consult, and need for hospitalization.  [DC]   1821 Discussed with Dr. Young Mccarthy, nephrology, discussed patient's clinical course and findings today, treatment modalities, and he will consult [DC]      ED Course User Index  [DC] Ziggy Prince MD       Hospitalization Considered?: yes    Orders Placed During This Visit:  Orders Placed This Encounter   Procedures    Basic Metabolic Panel    BNP    Magnesium    CBC Auto Differential    [place] Draw    Nephrology (on -call MD unless specified)    Initiate Observation Status    CBC & Differential    Gold Top - SST    Gray Top    Light Blue Top       Additional orders considered but not placed:      Independent interpretation of labs, radiology studies, and discussions with consultants: See ED Course        AS OF 18:23 EDT VITALS:    BP - 158/71  HR - 79  TEMP - 97.4 °F (36.3 °C) (Oral)  02 SATS - 95%          DIAGNOSIS  Final diagnoses:   Acute renal failure superimposed on chronic kidney disease, unspecified acute renal failure type, unspecified CKD stage   Peripheral edema   Chronic anemia         DISPOSITION  ED Disposition       ED Disposition   Decision to Admit    Condition   --    Comment   Level of Care: Telemetry [5]   Diagnosis: Acute on chronic renal failure [[id number]]   Admitting Physician: CASE BARNARD [[id number]]   Attending Physician: CASE BARNARD [[id number]]   Is patient appropriate for Inpatient Observation Unit?: No [0]                  Please note that portions of this document were completed with a voice recognition program.    Note Disclaimer: At [facility], we believe that sharing  information builds trust and better relationships. You are receiving this note because you recently visited [facility]. It is possible you will see health information before a provider has talked with you about it. This kind of information can be easy to misunderstand. To help you fully understand what it means for your health, we urge you to discuss this note with your provider.                       Ziggy Prince MD  05/07/25 [id number]

## 2025-05-07 NOTE — TELEPHONE ENCOUNTER
Patient has chronic issues with urinary incontinence, and has multiple comorbidities preventing her from getting to the bathroom in a timely manner, increasing her risk of incontinence and thus falls.  Patient would benefit greatly from a pure wick system.

## 2025-05-08 ENCOUNTER — APPOINTMENT (OUTPATIENT)
Dept: CARDIOLOGY | Facility: HOSPITAL | Age: 85
DRG: 292 | End: 2025-05-08
Payer: MEDICARE

## 2025-05-08 PROBLEM — E11.9 DIABETES: Status: ACTIVE | Noted: 2025-05-08

## 2025-05-08 PROBLEM — I73.9 PVD (PERIPHERAL VASCULAR DISEASE) WITH CLAUDICATION: Status: ACTIVE | Noted: 2025-05-08

## 2025-05-08 PROBLEM — L03.90 CELLULITIS: Status: ACTIVE | Noted: 2025-05-08

## 2025-05-08 PROBLEM — D64.9 ANEMIA: Status: ACTIVE | Noted: 2025-05-08

## 2025-05-08 LAB
ALBUMIN SERPL-MCNC: 3.2 G/DL (ref 3.5–5.2)
ANION GAP SERPL CALCULATED.3IONS-SCNC: 15.5 MMOL/L (ref 5–15)
B-OH-BUTYR SERPL-SCNC: 0.17 MMOL/L (ref 0.02–0.27)
BH CV LOWER ARTERIAL LEFT ABI RATIO: 0.31
BH CV LOWER ARTERIAL LEFT CALF RATIO: 0.22
BH CV LOWER ARTERIAL LEFT DORSALIS PEDIS SYS MAX: 45
BH CV LOWER ARTERIAL LEFT GREAT TOE SYS MAX: 0
BH CV LOWER ARTERIAL LEFT LOW THIGH RATIO: 0.48
BH CV LOWER ARTERIAL LEFT LOW THIGH SYS MAX: 70
BH CV LOWER ARTERIAL LEFT POPLITEAL SYS MAX: 32
BH CV LOWER ARTERIAL LEFT POST TIBIAL SYS MAX: 0
BH CV LOWER ARTERIAL LEFT TBI RATIO: 0
BH CV LOWER ARTERIAL RIGHT ABI RATIO: 0
BH CV LOWER ARTERIAL RIGHT DORSALIS PEDIS SYS MAX: 0
BH CV LOWER ARTERIAL RIGHT GREAT TOE SYS MAX: 0
BH CV LOWER ARTERIAL RIGHT POST TIBIAL SYS MAX: 0
BH CV LOWER ARTERIAL RIGHT TBI RATIO: 0
BUN SERPL-MCNC: 80 MG/DL (ref 8–23)
BUN/CREAT SERPL: 24 (ref 7–25)
CALCIUM SPEC-SCNC: 8.3 MG/DL (ref 8.6–10.5)
CHLORIDE SERPL-SCNC: 106 MMOL/L (ref 98–107)
CO2 SERPL-SCNC: 14.5 MMOL/L (ref 22–29)
CREAT SERPL-MCNC: 3.33 MG/DL (ref 0.57–1)
CREAT UR-MCNC: 25.1 MG/DL
D-LACTATE SERPL-SCNC: 1.5 MMOL/L (ref 0.5–2)
DEPRECATED RDW RBC AUTO: 48.4 FL (ref 37–54)
EGFRCR SERPLBLD CKD-EPI 2021: 13.1 ML/MIN/1.73
ERYTHROCYTE [DISTWIDTH] IN BLOOD BY AUTOMATED COUNT: 15.5 % (ref 12.3–15.4)
GLUCOSE BLDC GLUCOMTR-MCNC: 161 MG/DL (ref 70–130)
GLUCOSE BLDC GLUCOMTR-MCNC: 232 MG/DL (ref 70–130)
GLUCOSE BLDC GLUCOMTR-MCNC: 238 MG/DL (ref 70–130)
GLUCOSE BLDC GLUCOMTR-MCNC: 292 MG/DL (ref 70–130)
GLUCOSE SERPL-MCNC: 185 MG/DL (ref 65–99)
HCT VFR BLD AUTO: 25.9 % (ref 34–46.6)
HGB BLD-MCNC: 8.2 G/DL (ref 12–15.9)
MCH RBC QN AUTO: 27.2 PG (ref 26.6–33)
MCHC RBC AUTO-ENTMCNC: 31.7 G/DL (ref 31.5–35.7)
MCV RBC AUTO: 86 FL (ref 79–97)
PHOSPHATE SERPL-MCNC: 5.8 MG/DL (ref 2.5–4.5)
PLATELET # BLD AUTO: 314 10*3/MM3 (ref 140–450)
PMV BLD AUTO: 9.8 FL (ref 6–12)
POTASSIUM SERPL-SCNC: 4.5 MMOL/L (ref 3.5–5.2)
PROT ?TM UR-MCNC: 131.2 MG/DL
PROT/CREAT UR: 5227.1 MG/G CREA (ref 0–200)
RBC # BLD AUTO: 3.01 10*6/MM3 (ref 3.77–5.28)
SODIUM SERPL-SCNC: 136 MMOL/L (ref 136–145)
UPPER ARTERIAL LEFT ARM BRACHIAL SYS MAX: 146
UPPER ARTERIAL RIGHT ARM BRACHIAL SYS MAX: 144
URATE SERPL-MCNC: 10.4 MG/DL (ref 2.4–5.7)
WBC NRBC COR # BLD AUTO: 14 10*3/MM3 (ref 3.4–10.8)

## 2025-05-08 PROCEDURE — 25010000002 HYDROMORPHONE PER 4 MG: Performed by: INTERNAL MEDICINE

## 2025-05-08 PROCEDURE — 25010000002 CEFTRIAXONE PER 250 MG: Performed by: INTERNAL MEDICINE

## 2025-05-08 PROCEDURE — 25010000002 FUROSEMIDE PER 20 MG: Performed by: INTERNAL MEDICINE

## 2025-05-08 PROCEDURE — 82570 ASSAY OF URINE CREATININE: CPT

## 2025-05-08 PROCEDURE — 82010 KETONE BODYS QUAN: CPT

## 2025-05-08 PROCEDURE — 85027 COMPLETE CBC AUTOMATED: CPT | Performed by: INTERNAL MEDICINE

## 2025-05-08 PROCEDURE — 83605 ASSAY OF LACTIC ACID: CPT | Performed by: INTERNAL MEDICINE

## 2025-05-08 PROCEDURE — 84550 ASSAY OF BLOOD/URIC ACID: CPT | Performed by: HOSPITALIST

## 2025-05-08 PROCEDURE — 87040 BLOOD CULTURE FOR BACTERIA: CPT | Performed by: INTERNAL MEDICINE

## 2025-05-08 PROCEDURE — 63710000001 INSULIN LISPRO (HUMAN) PER 5 UNITS: Performed by: INTERNAL MEDICINE

## 2025-05-08 PROCEDURE — 99222 1ST HOSP IP/OBS MODERATE 55: CPT | Performed by: SURGERY

## 2025-05-08 PROCEDURE — 93923 UPR/LXTR ART STDY 3+ LVLS: CPT | Performed by: SURGERY

## 2025-05-08 PROCEDURE — 80069 RENAL FUNCTION PANEL: CPT | Performed by: HOSPITALIST

## 2025-05-08 PROCEDURE — 63710000001 INSULIN GLARGINE PER 5 UNITS: Performed by: INTERNAL MEDICINE

## 2025-05-08 PROCEDURE — 84156 ASSAY OF PROTEIN URINE: CPT

## 2025-05-08 PROCEDURE — 93923 UPR/LXTR ART STDY 3+ LVLS: CPT

## 2025-05-08 PROCEDURE — 82948 REAGENT STRIP/BLOOD GLUCOSE: CPT

## 2025-05-08 PROCEDURE — 36415 COLL VENOUS BLD VENIPUNCTURE: CPT | Performed by: INTERNAL MEDICINE

## 2025-05-08 RX ORDER — SODIUM CHLORIDE 0.9 % (FLUSH) 0.9 %
10 SYRINGE (ML) INJECTION EVERY 12 HOURS SCHEDULED
Status: DISCONTINUED | OUTPATIENT
Start: 2025-05-08 | End: 2025-05-16 | Stop reason: HOSPADM

## 2025-05-08 RX ORDER — SODIUM BICARBONATE 650 MG/1
650 TABLET ORAL 3 TIMES DAILY
Status: DISCONTINUED | OUTPATIENT
Start: 2025-05-08 | End: 2025-05-16 | Stop reason: HOSPADM

## 2025-05-08 RX ORDER — INSULIN LISPRO 100 [IU]/ML
2-9 INJECTION, SOLUTION INTRAVENOUS; SUBCUTANEOUS
Status: DISCONTINUED | OUTPATIENT
Start: 2025-05-08 | End: 2025-05-16 | Stop reason: HOSPADM

## 2025-05-08 RX ORDER — NICOTINE POLACRILEX 4 MG
15 LOZENGE BUCCAL
Status: DISCONTINUED | OUTPATIENT
Start: 2025-05-08 | End: 2025-05-16 | Stop reason: HOSPADM

## 2025-05-08 RX ORDER — DEXTROSE MONOHYDRATE 25 G/50ML
25 INJECTION, SOLUTION INTRAVENOUS
Status: DISCONTINUED | OUTPATIENT
Start: 2025-05-08 | End: 2025-05-16 | Stop reason: HOSPADM

## 2025-05-08 RX ORDER — SODIUM CHLORIDE 0.9 % (FLUSH) 0.9 %
10 SYRINGE (ML) INJECTION AS NEEDED
Status: DISCONTINUED | OUTPATIENT
Start: 2025-05-08 | End: 2025-05-16 | Stop reason: HOSPADM

## 2025-05-08 RX ORDER — SODIUM CHLORIDE 9 MG/ML
40 INJECTION, SOLUTION INTRAVENOUS AS NEEDED
Status: DISCONTINUED | OUTPATIENT
Start: 2025-05-08 | End: 2025-05-16 | Stop reason: HOSPADM

## 2025-05-08 RX ORDER — IBUPROFEN 600 MG/1
1 TABLET ORAL
Status: DISCONTINUED | OUTPATIENT
Start: 2025-05-08 | End: 2025-05-16 | Stop reason: HOSPADM

## 2025-05-08 RX ADMIN — PREGABALIN 25 MG: 25 CAPSULE ORAL at 22:28

## 2025-05-08 RX ADMIN — HYDROCODONE BITARTRATE AND ACETAMINOPHEN 1 TABLET: 7.5; 325 TABLET ORAL at 05:11

## 2025-05-08 RX ADMIN — HYDRALAZINE HYDROCHLORIDE 50 MG: 50 TABLET ORAL at 05:11

## 2025-05-08 RX ADMIN — INSULIN LISPRO 6 UNITS: 100 INJECTION, SOLUTION INTRAVENOUS; SUBCUTANEOUS at 22:26

## 2025-05-08 RX ADMIN — HYDROMORPHONE HYDROCHLORIDE 0.5 MG: 1 INJECTION, SOLUTION INTRAMUSCULAR; INTRAVENOUS; SUBCUTANEOUS at 10:27

## 2025-05-08 RX ADMIN — ATORVASTATIN CALCIUM 80 MG: 80 TABLET, FILM COATED ORAL at 22:27

## 2025-05-08 RX ADMIN — INSULIN LISPRO 2 UNITS: 100 INJECTION, SOLUTION INTRAVENOUS; SUBCUTANEOUS at 17:12

## 2025-05-08 RX ADMIN — HYDROMORPHONE HYDROCHLORIDE 0.5 MG: 1 INJECTION, SOLUTION INTRAMUSCULAR; INTRAVENOUS; SUBCUTANEOUS at 22:42

## 2025-05-08 RX ADMIN — FUROSEMIDE 80 MG: 10 INJECTION, SOLUTION INTRAMUSCULAR; INTRAVENOUS at 09:10

## 2025-05-08 RX ADMIN — SODIUM BICARBONATE 650 MG: 650 TABLET ORAL at 22:42

## 2025-05-08 RX ADMIN — LEVOTHYROXINE SODIUM 137 MCG: 137 TABLET ORAL at 05:11

## 2025-05-08 RX ADMIN — Medication 10 ML: at 22:28

## 2025-05-08 RX ADMIN — HYDROMORPHONE HYDROCHLORIDE 0.5 MG: 1 INJECTION, SOLUTION INTRAMUSCULAR; INTRAVENOUS; SUBCUTANEOUS at 03:49

## 2025-05-08 RX ADMIN — CEFTRIAXONE 2000 MG: 2 INJECTION, POWDER, FOR SOLUTION INTRAMUSCULAR; INTRAVENOUS at 22:43

## 2025-05-08 RX ADMIN — Medication 10 ML: at 09:12

## 2025-05-08 RX ADMIN — INSULIN GLARGINE 90 UNITS: 100 INJECTION, SOLUTION SUBCUTANEOUS at 09:10

## 2025-05-08 RX ADMIN — FUROSEMIDE 80 MG: 10 INJECTION, SOLUTION INTRAMUSCULAR; INTRAVENOUS at 17:13

## 2025-05-08 RX ADMIN — PANTOPRAZOLE SODIUM 40 MG: 40 TABLET, DELAYED RELEASE ORAL at 05:11

## 2025-05-08 RX ADMIN — SODIUM BICARBONATE 650 MG: 650 TABLET ORAL at 17:12

## 2025-05-08 RX ADMIN — PREGABALIN 25 MG: 25 CAPSULE ORAL at 09:10

## 2025-05-08 RX ADMIN — HYDRALAZINE HYDROCHLORIDE 50 MG: 50 TABLET ORAL at 22:27

## 2025-05-08 RX ADMIN — BISOPROLOL FUMARATE 5 MG: 5 TABLET ORAL at 09:10

## 2025-05-08 RX ADMIN — AMLODIPINE BESYLATE 5 MG: 5 TABLET ORAL at 09:10

## 2025-05-08 NOTE — PLAN OF CARE
Goal Outcome Evaluation:   Admitted for acute on chronic renal failure with c/o BLE pain and swelling. IV lasix x1. IV abx for cellulitis. BCx x2 collected. PRN IV Dilaudid and Norco for pain. Vascular surgery consult pending. Nephrology following.

## 2025-05-08 NOTE — CASE MANAGEMENT/SOCIAL WORK
Discharge Planning Assessment  [facility]     Patient Name: Halie Guidry  MRN: [id number]  Today's Date: 5/8/2025    Admit Date: 5/7/2025    Plan: From AL at [facility]; PT eval pending   Discharge Needs Assessment       Row Name 05/08/25 0953       Living Environment    People in Home facility resident    Current Living Arrangements assisted living facility    Primary Care Provided by self    Provides Primary Care For no one    Family Caregiver if Needed child(beatriz), adult    Quality of Family Relationships helpful;involved    Able to Return to Prior Arrangements yes       Transition Planning    Patient/Family Anticipates Transition to long-term care facility    Patient/Family Anticipated Services at Transition skilled nursing    Transportation Anticipated health plan transportation       Discharge Needs Assessment    Equipment Currently Used at Home wheelchair;walker, rolling    Concerns to be Addressed discharge planning    Equipment Needed After Discharge none    Outpatient/Agency/Support Group Needs skilled nursing facility    Discharge Facility/Level of Care Needs nursing facility, skilled    Provided Post Acute Provider List? Yes    Post Acute Provider List Nursing Home    Delivered To Patient    Method of Delivery In person                   Discharge Plan       Row Name 05/08/25 0958       Plan    Plan From AL at [facility]; PT eval pending    Patient/Family in Agreement with Plan yes    Plan Comments Spoke with pt bedside and son Derrick GREENBERG via phone. Confirmed facesheet correct. Explained role of CCP. Pt reports she is from AL at [facility]. She is mostly IADLs has walker and wheelchair and is independent with medications. Pt reports she has used [facility] in past and would use again (referral placed). Pt reports she has been to multiple SNF some she wouldn't return to, she has been to [facility], [facility], [facility] and  [facility]. PT eval pending. Pt's PCP and pharmacy verified. Spoke with pt's son who would prefer for pt to return to AL if she is able. CCP to follow up with PT eval and assist with d/c planning. VM left for Admissions director at [facility]. SNF list left bedside.                  Continued Care and Services - Admitted Since 5/7/2025       Home Medical Care       Service Provider Request Status Services Address Phone Fax Patient Preferred    [facility] Accepted -- [address], [address] [address] [phone] --                  Selected Continued Care - Episodes Includes continued care and service providers with selected services from the active episodes listed below          Selected Continued Care - Prior Encounters Includes continued care and service providers with selected services from prior encounters from 2/6/2025 to 5/8/2025      Discharged on 2/21/2025 Admission date: 2/17/2025 - Discharge disposition: Skilled Nursing Facility (DC - External)      Destination       Service Provider Services Address Phone Fax Patient Preferred    [facility] Skilled Nursing [address] [address] [phone] --                             Demographic Summary    No documentation.                  Functional Status    No documentation.                  Psychosocial    No documentation.                  Abuse/Neglect    No documentation.                  Legal    No documentation.                  Substance Abuse    No documentation.                  Patient Forms    No documentation.                     ANA MARIA Dixon

## 2025-05-08 NOTE — CONSULTS
[facility] Consult Note      Patient Name: Halie Guidry  : 1940  MRN: [id number]  Primary Care Physician:  Rossy Laird APRN  Referring Physician: No ref. provider found  Date of admission: 2025    Subjective     Reason for Consult: VIPUL on CKD 4    HPI:   Halie Guidry is a 84 y.o. female with CKD 4 (baseline SCr 2.3-2.6), PAD, DM2, HTN, hypothyroidism, and pancreatic mass, who presented to the hospital yesterday from SNF for increased BLE edema/erythema, evaluated by vascular surgery, planning on BLE Doppler with possible need for CTA.    On admission, SCr up to 3.46, today stable at 3.33.  UA positive for 3+ protein, TNTC WBC, and 4+ bacteria.  CXR suggested greater pulm vascular congestion than prior with minimal bilateral pleural effusions.  Patient was started on IV Lasix 80 mg BID since last night with subpar urine output.    Patient is sleepy and groggy, drifts back to sleep easily during conversation; though denies chest pain, SOA, N/V/D.    Review of Systems:   14 point review of systems is otherwise negative except for mentioned above on HPI    Personal History     Past Medical History:   Diagnosis Date    Acute metabolic encephalopathy 2022    Anxiety     Arthritis     Benign essential hypertension 2014    Bleeding disorder     Depression     Diabetes     Diabetes mellitus, type 2     Disc degeneration, lumbar     Headache, tension-type     Hyperlipidemia     Hypothyroidism     Neuropathy     Osteoporosis 2015    Pancreatic mass     2023, on Monthly Octreotide Injection    Peripheral neuropathy     Rotator cuff tear, left     Scoliosis     Shoulder pain     LEFT, TORN ROTATOR CUFF S/P FALL    Spinal stenosis        Past Surgical History:   Procedure Laterality Date    APPENDECTOMY      BILATERAL BREAST REDUCTION Bilateral 2015    CATARACT EXTRACTION  2015    CHOLECYSTECTOMY WITH INTRAOPERATIVE CHOLANGIOGRAM N/A  3/27/2022    Procedure: CHOLECYSTECTOMY LAPAROSCOPIC INTRAOPERATIVE CHOLANGIOGRAM;  Surgeon: Aiyana Hill MD;  Location: [facility] MAIN OR;  Service: General;  Laterality: N/A;    COLONOSCOPY  06/05/2015    WNL    ERCP N/A 2/25/2022    Procedure: ENDOSCOPIC RETROGRADE CHOLANGIOPANCREATOGRAPHY with sphincterotomy and balloon sweep;  Surgeon: Chilo Wilhelm MD;  Location: [facility] ENDOSCOPY;  Service: Gastroenterology;  Laterality: N/A;  PRE/POST - CBD stones    ERCP N/A 3/28/2022    Procedure: ENDOSCOPIC RETROGRADE CHOLANGIOPANCREATOGRAPHY WITH SPHINCTEROTOMY AND BALLOON SWEEP;  Surgeon: Chilo Wilhelm MD;  Location: [facility] ENDOSCOPY;  Service: Gastroenterology;  Laterality: N/A;  PRE: COMMON DUCT STONE  POST: COMMON DUCT STONE    KYPHOPLASTY      REDUCTION MAMMAPLASTY      TONSILLECTOMY         Family History: family history includes Bone cancer in her mother; Heart disease in her daughter; No Known Problems in her father.    Social History:  reports that she quit smoking about 12 years ago. Her smoking use included cigarettes. She started smoking about 52 years ago. She has a 40 pack-year smoking history. She has been exposed to tobacco smoke. She has never used smokeless tobacco. She reports that she does not drink alcohol and does not use drugs.    Home Medications:  Prior to Admission medications    Medication Sig Start Date End Date Taking? Authorizing Provider   amLODIPine (NORVASC) 5 MG tablet Take 1 tablet by mouth Daily. 2/22/25   Yanet Bartlett APRN   atorvastatin (LIPITOR) 80 MG tablet Take 1 tablet by mouth Every Night. Indications: High Amount of Fats in the Blood 3/14/24   Lilia Orona APRN   bisoprolol (ZEBeta) 5 MG tablet Take 1 tablet by mouth Daily. 10/8/24   Napoleon Mead MD   DULoxetine (CYMBALTA) 30 MG capsule Take 1 capsule by mouth Daily. 10/8/24   Napoleon Mead MD   ferrous sulfate 325 (65 FE) MG tablet Take 1 tablet by mouth Every Other Day. 9/6/24   Garima  Balbina BREWSTER MD   fluticasone (FLONASE) 50 MCG/ACT nasal spray Administer 2 sprays into the nostril(s) as directed by provider Daily. 10/8/24   Napoleon Mead MD   hydrALAZINE (APRESOLINE) 50 MG tablet Take 1 tablet by mouth Every 8 (Eight) Hours. 2/21/25   Yanet Bartlett APRN   Insulin Glargine, 2 Unit Dial, (TOUJEO) 300 UNIT/ML solution pen-injector injection Inject 90 Units under the skin into the appropriate area as directed Daily. 11/25/24   Lilia Orona APRN   Insulin Pen Needle (BD Pen Needle Naila 2nd Gen) 32G X 4 MM misc Use 1 pen needle 4 (Four) Times a Day. 9/10/24   Lilia Orona APRN   lansoprazole (PREVACID) 30 MG capsule TAKE 1 CAPSULE DAILY 11/20/24   Napoleon Gutierrez MD   levothyroxine (SYNTHROID, LEVOTHROID) 137 MCG tablet Take 1 tablet by mouth Every Morning. 10/8/24   Napoleon Mead MD   pregabalin (LYRICA) 75 MG capsule Take 1 capsule by mouth 2 (Two) Times a Day for 3 days. 1/21/25 2/17/25  Lewis Sheppard MD   torsemide (DEMADEX) 20 MG tablet Take 2 tablets by mouth Daily. 1/21/25   Lewis Sheppard MD       Allergies:  Allergies   Allergen Reactions    Sulfa Antibiotics Unknown - High Severity     Pt says it was a long time ago and I don't remember but it wasn't good.        Objective     Vitals:   Temp:  [98 °F (36.7 °C)-98.4 °F (36.9 °C)] 98 °F (36.7 °C)  Heart Rate:  [77-90] 85  Resp:  [17-18] 17  BP: (135-177)/() 161/56    Intake/Output Summary (Last 24 hours) at 5/8/2025 1633  Last data filed at 5/8/2025 1025  Gross per 24 hour   Intake 210 ml   Output 1000 ml   Net -790 ml       Physical Exam:   Constitutional: Groggy, chronically ill, MO, no acute distress.  HEENT: Sclera anicteric, no conjunctival injection  Neck: Supple, no JVD  Respiratory: Bibasilar crackles, nonlabored respiration on RA  Cardiovascular: RRR, no rub  Gastrointestinal: Positive bowel sounds, abdomen is soft, nontender and nondistended  : No palpable bladder  Musculoskeletal: 2+ BLE edema  with erythema; ulcer to left leg  Psychiatric: Flat affect, cooperative  Neurologic: Sleepy, moves all extremities, + asterixis  Skin: Warm and dry       Scheduled Meds:     [Held by provider] amLODIPine, 5 mg, Oral, Q24H  atorvastatin, 80 mg, Oral, Nightly  bisoprolol, 5 mg, Oral, Daily  cefTRIAXone, 2,000 mg, Intravenous, Q24H  ferrous sulfate, 325 mg, Oral, Every Other Day  furosemide, 80 mg, Intravenous, BID Diuretics  hydrALAZINE, 50 mg, Oral, Q8H  insulin glargine, 90 Units, Subcutaneous, Daily  levothyroxine, 137 mcg, Oral, Q AM  pantoprazole, 40 mg, Oral, Q AM  pregabalin, 25 mg, Oral, BID  sodium chloride, 10 mL, Intravenous, Q12H      IV Meds:        Results Reviewed:   I have personally reviewed the results from the time of this admission to 5/8/2025 16:33 EDT     Lab Results   Component Value Date    GLUCOSE 185 (H) 05/08/2025    CALCIUM 8.3 (L) 05/08/2025     05/08/2025    K 4.5 05/08/2025    CO2 14.5 (L) 05/08/2025     05/08/2025    BUN 80 (H) 05/08/2025    CREATININE 3.33 (H) 05/08/2025    EGFRIFAFRI 50 (L) 01/18/2021    EGFRIFNONA 51 (L) 02/28/2022    BCR 24.0 05/08/2025    ANIONGAP 15.5 (H) 05/08/2025      Lab Results   Component Value Date    MG 1.7 05/07/2025    PHOS 5.8 (H) 05/08/2025    ALBUMIN 3.2 (L) 05/08/2025           Assessment / Plan       Acute on chronic renal failure    Hypothyroidism (acquired)    Acute kidney injury    CKD (chronic kidney disease) stage 4, GFR 15-29 ml/min    HTN (hypertension)    Dependent edema    Diabetes    PVD (peripheral vascular disease) with claudication    Cellulitis    Anemia      ASSESSMENT:  Nonoliguric VIPUL, suspect secondary to CHF exacerbation.  Patient has volume excess on both exam and imaging.  UA positive for 3+ protein, 4+ bacteria, and TNTC WBC.  Electrolytes within acceptable range except hyperphosphatemia  High anion gap metabolic acidosis associated with normal anion gap metabolic acidosis, secondary to VIPUL/CKD and bicarb loss  CKD  stage IIIb, baseline SCr 2.3-2.6, secondary to longstanding hypertensive/diabetic nephrosclerosis and chronic cardiorenal syndrome  HTN with CKD, BP initially elevated, better controlled today  DM2 with CKD managed by primary team  BLE edema/PAD, evaluated by vascular surgery, planning on arterial doppler and  possible CTA with runoff for purposes of limbs salvage  UTI, managed by primary team    PLAN:  Hold amlodipine given bilateral lower extremities edema  Continue current dose of Lasix 80 mg twice daily.  May need to hold Lasix if CTA is planned as she is at high risk of contrast-induced nephropathy  Start oral sodium bicarbonate  Labs in a.m.  Check UPCR and beta hydroxybutyrate  Surveillance labs    Thank you for involving us in the care of Halie Guidry.  Please feel free to call with any questions.    Brendon Aguilar MD  05/08/25  16:33 EDT    [facility]  [phone]      Please note that portions of this note were completed with a voice recognition program.

## 2025-05-08 NOTE — DISCHARGE PLACEMENT REQUEST
"Tabatha Guidry (84 y.o. Female)       Date of Birth   1940    Social Security Number       Address   [address] [place] [id number]    Home Phone   [phone]    MRN   [id number]       Taoist   Nondenominational    Marital Status                               Admission Date   5/7/2025    Admission Type   Emergency    Admitting Provider   Kim Barnard MD    Attending Provider   Kelechi Sanchez MD    Department, Room/Bed   [address], E468/1       Discharge Date       Discharge Disposition       Discharge Destination                                 Attending Provider: Kelechi Sanchez MD    Allergies: Sulfa Antibiotics    Isolation: Contact   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), Candida Auris (rule out) (05/07/25)   Code Status: CPR    Ht: 172.7 cm (67.99\")   Wt: 103 kg (227 lb 4.7 oz)    Admission Cmt: None   Principal Problem: Acute on chronic renal failure [N17.9,N18.9]                   Active Insurance as of 5/7/2025       Primary Coverage       Payor Plan Insurance Group Employer/Plan Group    [employer] MEDICARE REPLACEMENT UHC Medicare Advantage GROUP PPO [id number]       Payor Plan Address Payor Plan Phone Number Payor Plan Fax Number Effective Dates    [address] [address]   1/1/2023 - None Entered    [facility] [id number]         Subscriber Name Subscriber Birth Date Member ID       TABATHA GUIDRY 1940 [id number]                     Emergency Contacts        (Rel.) Home Phone Work Phone Mobile Phone    Derrick Guidry (HCS) (Son) [phone] -- [phone]    Josse Guidry (HCS) (Son) -- -- [phone]    JAYME GUIDRY (Grandchild) -- -- [phone]          "

## 2025-05-08 NOTE — PROGRESS NOTES
Name: Halie Guidry ADMIT: 2025   : 1940  PCP: Rossy Laird APRN    MRN: [id number] LOS: 0 days   AGE/SEX: 84 y.o. female  ROOM: [facility]     Subjective   Subjective   Patient is lying on the bed and is chronically ill-appearing.  Complaining of bilateral leg/feet pain.  Denies nausea, vomiting, chest pain, shortness of breath.       Objective   Objective   Vital Signs  Temp:  [97.4 °F (36.3 °C)-98.4 °F (36.9 °C)] 98.4 °F (36.9 °C)  Heart Rate:  [77-90] 86  Resp:  [18] 18  BP: (135-177)/() 161/56  SpO2:  [88 %-100 %] 99 %  on   ;   Device (Oxygen Therapy): room air  Body mass index is 34.57 kg/m².  Physical Exam  HEENT: PERRLA, extract movements intact, status Noctura's  Neck: Supple, no JVD  Cardiovascular: Regular rate and rhythm with normal S1 and S2  Respiratory: Fairly clear to auscultation anteriorly with no wheezes  GI: Soft, nontender, bowel sounds present  Extremities: Positive for edema, bilateral lower extremity erythema noted.  Neurologic: Globally weak, no facial asymmetry, oriented x 3    Results Review     I reviewed the patient's new clinical results.  Results from last 7 days   Lab Units 25  1706   WBC 10*3/mm3 14.00* 10.69   HEMOGLOBIN g/dL 8.2* 8.5*   PLATELETS 10*3/mm3 314 333     Results from last 7 days   Lab Units 252 25  1706   SODIUM mmol/L 136 135*   POTASSIUM mmol/L 4.5 4.5   CHLORIDE mmol/L 106 105   CO2 mmol/L 14.5* 14.8*   BUN mg/dL 80* 77*   CREATININE mg/dL 3.33* 3.46*   GLUCOSE mg/dL 185* 253*   EGFR mL/min/1.73 13.1* 12.6*     Results from last 7 days   Lab Units 25   ALBUMIN g/dL 3.2*     Results from last 7 days   Lab Units 252 25  1706   CALCIUM mg/dL 8.3* 8.2*   ALBUMIN g/dL 3.2*  --    MAGNESIUM mg/dL  --  1.7   PHOSPHORUS mg/dL 5.8*  --      Results from last 7 days   Lab Units 25  0222   LACTATE mmol/L 1.5     Glucose   Date/Time Value Ref Range Status   2025 2804  238 (H) 70 - 130 mg/dL Final   05/08/2025 0822 232 (H) 70 - 130 mg/dL Final       No radiology results for the last day    I have personally reviewed all medications:  Scheduled Medications  [Held by provider] amLODIPine, 5 mg, Oral, Q24H  atorvastatin, 80 mg, Oral, Nightly  bisoprolol, 5 mg, Oral, Daily  cefTRIAXone, 2,000 mg, Intravenous, Q24H  ferrous sulfate, 325 mg, Oral, Every Other Day  furosemide, 80 mg, Intravenous, BID Diuretics  hydrALAZINE, 50 mg, Oral, Q8H  insulin glargine, 90 Units, Subcutaneous, Daily  levothyroxine, 137 mcg, Oral, Q AM  pantoprazole, 40 mg, Oral, Q AM  pregabalin, 25 mg, Oral, BID  sodium chloride, 10 mL, Intravenous, Q12H    Infusions   Diet  Diet: Cardiac, Renal; Healthy Heart (2-3 Na+); Low Sodium (2-3g), Low Phosphorus; Fluid Consistency: Thin (IDDSI 0)    I have personally reviewed:  [x]  Laboratory   [x]  Microbiology   [x]  Radiology   [x]  EKG/Telemetry  [x]  Cardiology/Vascular   []  Pathology    []  Records       Assessment/Plan     Active Hospital Problems    Diagnosis  POA    **Acute on chronic renal failure [N17.9, N18.9]  Yes      Resolved Hospital Problems   No resolved problems to display.       84 y.o. female admitted with Acute on chronic renal failure.    1. Acute on CKD stage IV, baseline creatinine is around 2.6 and was noted to be 3.46 upon admission.  Home torsemide has been discontinued and nephrology initiated patient on Lasix 80 mg IV every 12 hours.  Avoid nephrotoxins.  Creatinine slightly better at 3.33 today.  2.  Diabetes mellitus, continue with home Lantus and is on corrective dose insulin.  3.  Hypertension, on bisoprolol, hydralazine.  Home dose Norvasc has been held secondary to lower extremity edema.  4.  Peripheral vascular disease with suspected ischemic rest pain, vascular consult has been obtained and Dopplers are being ordered.  Continue with analgesics for pain control.  5.  Bilateral lower respiratory cellulitis, on IV Rocephin and will be  continued.  Will obtain infectious disease consult as well.  6.  Bilateral lower extremity edema, check 2D echocardiogram.  7.  Hypothyroidism, on Synthroid.  8.  Anemia, most likely is anemia of chronic disease however iron panel will be checked.  9.  Hyperlipidemia, on statins.  10.  CODE STATUS is full code.    Expected Discharge Date: 5/11/2025; Expected Discharge Time:       Kelechi Sanchez MD  [facility]  05/08/25  15:20 EDT

## 2025-05-08 NOTE — CONSULTS
[facility]   Consult Note    Patient Name: Halie Guidry  : 1940  MRN: [id number]  Primary Care Physician:  Rossy Laird APRN  Referring Physician: No ref. provider found  Date of admission: 2025    Inpatient Vascular Surgery Consult  Consult performed by: Ke Kilgore MD  Consult ordered by: Kim Barnard MD      Subjective   Subjective     Reason for Consult/ Chief Complaint: Lower extremity pain and swelling.    Leg Swelling    Halie Guidry is a 84 y.o. female patient known to me from office evaluation approximately 1 month ago.  Since that time she has had worsening pain in her feet that appears to be ischemic in etiology.  It is improved with dangling and worsening at night.  At this sounds fairly typical for ischemic rest pain.    Review of Systems     Personal History     Past Medical History:   Diagnosis Date    Acute metabolic encephalopathy 2022    Anxiety     Arthritis     Benign essential hypertension 2014    Bleeding disorder     Depression     Diabetes     Diabetes mellitus, type 2     Disc degeneration, lumbar     Headache, tension-type     Hyperlipidemia     Hypothyroidism     Neuropathy     Osteoporosis 2015    Pancreatic mass     2023, on Monthly Octreotide Injection    Peripheral neuropathy     Rotator cuff tear, left     Scoliosis     Shoulder pain     LEFT, TORN ROTATOR CUFF S/P FALL    Spinal stenosis        Past Surgical History:   Procedure Laterality Date    APPENDECTOMY      BILATERAL BREAST REDUCTION Bilateral 2015    CATARACT EXTRACTION  2015    CHOLECYSTECTOMY WITH INTRAOPERATIVE CHOLANGIOGRAM N/A 3/27/2022    Procedure: CHOLECYSTECTOMY LAPAROSCOPIC INTRAOPERATIVE CHOLANGIOGRAM;  Surgeon: Aiyana Hill MD;  Location: [facility];  Service: General;  Laterality: N/A;    COLONOSCOPY  2015    WNL    ERCP N/A 2022    Procedure: ENDOSCOPIC RETROGRADE CHOLANGIOPANCREATOGRAPHY with sphincterotomy and  balloon sweep;  Surgeon: Chilo Wilhelm MD;  Location: [facility] ENDOSCOPY;  Service: Gastroenterology;  Laterality: N/A;  PRE/POST - CBD stones    ERCP N/A 3/28/2022    Procedure: ENDOSCOPIC RETROGRADE CHOLANGIOPANCREATOGRAPHY WITH SPHINCTEROTOMY AND BALLOON SWEEP;  Surgeon: Chilo Wilhelm MD;  Location: [facility] ENDOSCOPY;  Service: Gastroenterology;  Laterality: N/A;  PRE: COMMON DUCT STONE  POST: COMMON DUCT STONE    KYPHOPLASTY      REDUCTION MAMMAPLASTY      TONSILLECTOMY         Family History: Her family history includes Bone cancer in her mother; Heart disease in her daughter; No Known Problems in her father.     Social History: She  reports that she quit smoking about 12 years ago. Her smoking use included cigarettes. She started smoking about 52 years ago. She has a 40 pack-year smoking history. She has been exposed to tobacco smoke. She has never used smokeless tobacco. She reports that she does not drink alcohol and does not use drugs.    Home Medications:  DULoxetine, Insulin Glargine (2 Unit Dial), Insulin Pen Needle, amLODIPine, atorvastatin, bisoprolol, ferrous sulfate, fluticasone, hydrALAZINE, lansoprazole, levothyroxine, pregabalin, and torsemide    Allergies:  She is allergic to sulfa antibiotics.    Objective    Objective     Vitals:    Temp:  [97.4 °F (36.3 °C)-98.1 °F (36.7 °C)] 98.1 °F (36.7 °C)  Heart Rate:  [77-90] 90  Resp:  [18] 18  BP: (137-177)/() 137/50  Output by Drain (mL) 05/07/25 0701 - 05/07/25 1900 05/07/25 1901 - 05/08/25 0700 05/08/25 0701 - 05/08/25 0739 Range Total   External Urinary Catheter  400  400       Physical Exam  Constitutional:       Appearance: She is well-developed.   Pulmonary:      Effort: Pulmonary effort is normal. No respiratory distress.   Abdominal:      General: There is no distension.      Palpations: Abdomen is soft.   Neurological:      Mental Status: She is alert and oriented to person, place, and time.     Redness of the bilateral lower  "extremities that becomes pale with elevation consistent with chronic ischemia.    Result Review    Result Review:  I have personally reviewed the results from the time of this admission to 5/8/2025 07:39 EDT and agree with these findings:  [x]  Laboratory list / accordion  []  Microbiology  [x]  Radiology  []  EKG/Telemetry   []  Cardiology/Vascular   []  Pathology  []  Old records  []  Other:  Most notable findings include:     Results from last 7 days   Lab Units 05/08/25  0222 05/07/25  1706   WBC 10*3/mm3 14.00* 10.69   HEMOGLOBIN g/dL 8.2* 8.5*   PLATELETS 10*3/mm3 314 333     Results from last 7 days   Lab Units 05/08/25  0222 05/07/25  1706   SODIUM mmol/L 136 135*   POTASSIUM mmol/L 4.5 4.5   CHLORIDE mmol/L 106 105   CO2 mmol/L 14.5* 14.8*   BUN mg/dL 80* 77*   CREATININE mg/dL 3.33* 3.46*   GLUCOSE mg/dL 185* 253*   MAGNESIUM mg/dL  --  1.7   PHOSPHORUS mg/dL 5.8*  --    Estimated Creatinine Clearance: 15.8 mL/min (A) (by C-G formula based on SCr of 3.33 mg/dL (H)).      Lab Results   Component Value Date    HGBA1C 9.10 (H) 11/06/2024    HGBA1C 10.30 (H) 10/12/2024    HGBA1C 7.90 (H) 08/24/2024   No results found for: \"POCGLU\"      Assessment & Plan   Assessment / Plan     Brief Patient Summary:  Halie Guidry is a 84 y.o. female multiple medical issues including acute on chronic renal insufficiency likely with ischemic rest pain of bilateral lower extremities left worse than right.    Prior CT scan of the abdomen and pelvis done without contrast 2/25/2025: Heavily calcified bulky distal aortic and proximal common iliac artery disease may represent arterial occlusions chronically.    Active Hospital Problems:  Active Hospital Problems    Diagnosis     **Acute on chronic renal failure          Plan:   5/8/2025: Difficult situation due to patient's multiple chronic medical comorbidities.  In reviewing her old CT scans from February she has highly calcified bulky aortoiliac disease.  I believe she has " ischemic rest pain of her feet.  Will start with a multisegmental lower extremity Doppler to quantify the degree of arterial insufficiency.  Will await nephrology's evaluation unfortunately patient might require CT angiogram of the lower extremities with runoff for purposes of limb salvage.  The risks of contrast-induced nephropathy and dialysis dependent renal failure will need to be weighed against the risk of limb loss.  I will order some wound care instructions for her right lateral calf wound.    VTE Prophylaxis:  Mechanical VTE prophylaxis orders are present.         Ke Kilgore MD

## 2025-05-08 NOTE — H&P
HISTORY AND PHYSICAL   [facility]        Date of Admission: 2025  Patient Identification:  Name: Halie Guidry  Age: 84 y.o.  Sex: female  :  1940  MRN: [id number]                     Primary Care Physician: Rossy Laird APRN    Chief Complaint:  84 year old female was sent in from a nursing home due to swelling of her legs as well as pain and redness of her toes; she has a history of pad but the pain has been worse than usual; no fever or chills; ;no shortness of breath or chest pain; no visitors are present with her    History of Present Illness:   As above    Past Medical History:  Past Medical History:   Diagnosis Date    Acute metabolic encephalopathy 2022    Anxiety     Arthritis     Benign essential hypertension 2014    Bleeding disorder     Depression     Diabetes     Diabetes mellitus, type 2     Disc degeneration, lumbar     Headache, tension-type     Hyperlipidemia     Hypothyroidism     Neuropathy     Osteoporosis 2015    Pancreatic mass     2023, on Monthly Octreotide Injection    Peripheral neuropathy     Rotator cuff tear, left     Scoliosis     Shoulder pain     LEFT, TORN ROTATOR CUFF S/P FALL    Spinal stenosis      Past Surgical History:  Past Surgical History:   Procedure Laterality Date    APPENDECTOMY      BILATERAL BREAST REDUCTION Bilateral 2015    CATARACT EXTRACTION  2015    CHOLECYSTECTOMY WITH INTRAOPERATIVE CHOLANGIOGRAM N/A 3/27/2022    Procedure: CHOLECYSTECTOMY LAPAROSCOPIC INTRAOPERATIVE CHOLANGIOGRAM;  Surgeon: Aiyana Hill MD;  Location: [facility] OR;  Service: General;  Laterality: N/A;    COLONOSCOPY  2015    WNL    ERCP N/A 2022    Procedure: ENDOSCOPIC RETROGRADE CHOLANGIOPANCREATOGRAPHY with sphincterotomy and balloon sweep;  Surgeon: Chilo Wilhelm MD;  Location: [facility] ENDOSCOPY;  Service: Gastroenterology;  Laterality: N/A;  PRE/POST - CBD stones    ERCP N/A 3/28/2022    Procedure:  ENDOSCOPIC RETROGRADE CHOLANGIOPANCREATOGRAPHY WITH SPHINCTEROTOMY AND BALLOON SWEEP;  Surgeon: Chilo Wilhelm MD;  Location: [facility] ENDOSCOPY;  Service: Gastroenterology;  Laterality: N/A;  PRE: COMMON DUCT STONE  POST: COMMON DUCT STONE    KYPHOPLASTY      REDUCTION MAMMAPLASTY      TONSILLECTOMY        Home Meds:  Medications Prior to Admission   Medication Sig Dispense Refill Last Dose/Taking    amLODIPine (NORVASC) 5 MG tablet Take 1 tablet by mouth Daily. 30 tablet 0     atorvastatin (LIPITOR) 80 MG tablet Take 1 tablet by mouth Every Night. Indications: High Amount of Fats in the Blood 30 tablet 2     bisoprolol (ZEBeta) 5 MG tablet Take 1 tablet by mouth Daily. 90 tablet 3     DULoxetine (CYMBALTA) 30 MG capsule Take 1 capsule by mouth Daily. 90 capsule 3     ferrous sulfate 325 (65 FE) MG tablet Take 1 tablet by mouth Every Other Day.       fluticasone (FLONASE) 50 MCG/ACT nasal spray Administer 2 sprays into the nostril(s) as directed by provider Daily. 16 g 11     hydrALAZINE (APRESOLINE) 50 MG tablet Take 1 tablet by mouth Every 8 (Eight) Hours. 30 tablet 0     Insulin Glargine, 2 Unit Dial, (TOUJEO) 300 UNIT/ML solution pen-injector injection Inject 90 Units under the skin into the appropriate area as directed Daily. 30 mL 0     Insulin Pen Needle (BD Pen Needle Naila 2nd Gen) 32G X 4 MM misc Use 1 pen needle 4 (Four) Times a Day. 400 each 2     lansoprazole (PREVACID) 30 MG capsule TAKE 1 CAPSULE DAILY 90 capsule 3     levothyroxine (SYNTHROID, LEVOTHROID) 137 MCG tablet Take 1 tablet by mouth Every Morning. 90 tablet 1     pregabalin (LYRICA) 75 MG capsule Take 1 capsule by mouth 2 (Two) Times a Day for 3 days. 6 capsule 0     torsemide (DEMADEX) 20 MG tablet Take 2 tablets by mouth Daily.          Allergies:  Allergies   Allergen Reactions    Sulfa Antibiotics Unknown - High Severity     Pt says it was a long time ago and I don't remember but it wasn't good.      Immunizations:  Immunization  History   Administered Date(s) Administered    COVID-19 (PFIZER) Purple Cap Monovalent 2021, 2021    FLUAD TRI 65YR+ 10/16/2019    FluMist 2-49yrs 10/03/2012    Fluad Quad 65+ 2020    Fluzone (or Fluarix & Flulaval for VFC) >6mos 2021    Fluzone High-Dose 65+YRS 10/09/2017, 10/09/2017, 2024    PPD Test 2024    Pneumococcal Conjugate 13-Valent (PCV13) 2017, 2017    Pneumococcal, Unspecified 10/01/2009    Shingrix 04/15/2019    Td (TDVAX) 2016    Tdap 2017, 2017    Zostavax 2010     Social History:   Social History     Social History Narrative    Not on file     Social History     Socioeconomic History    Marital status:     Number of children: 3   Tobacco Use    Smoking status: Former     Current packs/day: 0.00     Average packs/day: 1 pack/day for 40.0 years (40.0 ttl pk-yrs)     Types: Cigarettes     Start date:      Quit date:      Years since quittin.3     Passive exposure: Past    Smokeless tobacco: Never   Vaping Use    Vaping status: Never Used   Substance and Sexual Activity    Alcohol use: No    Drug use: No    Sexual activity: Defer       Family History:  Family History   Problem Relation Age of Onset    Bone cancer Mother     No Known Problems Father     Heart disease Daughter         Review of Systems  See history of present illness and past medical history.  Patient denies headache, dizziness, syncope, falls, trauma, change in vision, change in hearing, change in taste, changes in weight, changes in appetite, focal weakness, numbness, or paresthesia.  Patient denies chest pain, palpitations, dyspnea, orthopnea, PND, cough, sinus pressure, rhinorrhea, epistaxis, hemoptysis, nausea, vomiting,hematemesis, diarrhea, constipation or hematochezia.  Denies cold or heat intolerance, polydipsia, polyuria, polyphagia. Denies hematuria, pyuria, dysuria, hesitancy, frequency or urgency. Denies consumption of raw and under  "cooked meats foods or change in water source.  Denies fever, chills, sweats, night sweats.  Denies missing any routine medications. Remainder of ROS is negative.    Objective:  T Max 24 hrs: Temp (24hrs), Av.8 °F (36.6 °C), Min:97.4 °F (36.3 °C), Max:98.1 °F (36.7 °C)    Vitals Ranges:   Temp:  [97.4 °F (36.3 °C)-98.1 °F (36.7 °C)] 98.1 °F (36.7 °C)  Heart Rate:  [77-81] 80  Resp:  [18] 18  BP: (139-177)/() 139/100      Exam:  /100   Pulse 80   Temp 98.1 °F (36.7 °C)   Resp 18   Ht 172.7 cm (67.99\")   Wt 98.2 kg (216 lb 7.9 oz)   SpO2 100%   BMI 32.93 kg/m²     General Appearance:    Alert, cooperative, no distress, appears stated age   Head:    Normocephalic, without obvious abnormality, atraumatic   Eyes:    PERRL, conjunctivae/corneas clear, EOM's intact, both eyes   Ears:    Normal external ear canals, both ears   Nose:   Nares normal, septum midline, mucosa normal, no drainage    or sinus tenderness   Throat:   Lips, mucosa, and tongue normal   Neck:   Supple, symmetrical, trachea midline, no adenopathy;     thyroid:  no enlargement/tenderness/nodules; no carotid    bruit or JVD   Back:     Symmetric, no curvature, ROM normal, no CVA tenderness   Lungs:     Decreased breath sounds bilaterally, respirations unlabored   Chest Wall:    No tenderness or deformity    Heart:    Regular rate and rhythm, S1 and S2 normal, no murmur, rub   or gallop   Abdomen:     Soft, nontender, bowel sounds active all four quadrants,     no masses, no hepatomegaly, no splenomegaly   Extremities:   Extremities normal, atraumatic, edema both legs; erythema toes                       .    Data Review:  Labs in chart were reviewed.  WBC   Date Value Ref Range Status   2025 10.69 3.40 - 10.80 10*3/mm3 Final     Hemoglobin   Date Value Ref Range Status   2025 8.5 (L) 12.0 - 15.9 g/dL Final     Hematocrit   Date Value Ref Range Status   2025 26.2 (L) 34.0 - 46.6 % Final     Platelets   Date Value Ref " "Range Status   05/07/2025 333 140 - 450 10*3/mm3 Final     Sodium   Date Value Ref Range Status   05/07/2025 135 (L) 136 - 145 mmol/L Final     Potassium   Date Value Ref Range Status   05/07/2025 4.5 3.5 - 5.2 mmol/L Final     Chloride   Date Value Ref Range Status   05/07/2025 105 98 - 107 mmol/L Final     CO2   Date Value Ref Range Status   05/07/2025 14.8 (L) 22.0 - 29.0 mmol/L Final     BUN   Date Value Ref Range Status   05/07/2025 77 (H) 8 - 23 mg/dL Final     Creatinine   Date Value Ref Range Status   05/07/2025 3.46 (H) 0.57 - 1.00 mg/dL Final     Glucose   Date Value Ref Range Status   05/07/2025 253 (H) 65 - 99 mg/dL Final     Calcium   Date Value Ref Range Status   05/07/2025 8.2 (L) 8.6 - 10.5 mg/dL Final     Magnesium   Date Value Ref Range Status   05/07/2025 1.7 1.6 - 2.4 mg/dL Final     No results found for: \"AST\", \"ALT\", \"ALKPHOS\"             Imaging Results (All)       Procedure Component Value Units Date/Time    XR Chest PA & Lateral [[id number]] Collected: 05/07/25 2127     Updated: 05/07/25 2127    Narrative:      XR CHEST PA AND LATERAL-     HISTORY: 84-year-old female with dyspnea. Lower extremity swelling.     FINDINGS: Compared with 11/05/2024 CXR. There is greater pulmonary  vascular congestion than previously. There may be minimal bilateral  pleural effusions. No focal airspace consolidations are seen to suggest  pneumonia.                 Assessment:  Active Hospital Problems    Diagnosis  POA    **Acute on chronic renal failure [N17.9, N18.9]  Yes      Resolved Hospital Problems   No resolved problems to display.   Anemia  Hypertension  Diabetes  Hyperlipidemia  Pad  Cellulitis feet  anemia    Plan:  Ask vascular to see her  Antibiotics  Nephrology to see  Diurese a little  Accu checks, sliding scale  Dw patient, ed provider  Patient is full code and son is miguel angel Alvarez Brock Barnard MD  5/7/2025  22:42 EDT    "

## 2025-05-08 NOTE — PLAN OF CARE
Goal Outcome Evaluation:  Plan of Care Reviewed With: patient        Progress: no change  Outcome Evaluation: Oriented.  Reports pain in legs/feet - red/swollen.  Dressing applied to RLE per vascular order.  Medicated for pain in legs prn. Dilaudid makes her drowsy so slept during day.  External cath with good urine output.  Doppler of legs done today.  Echo ordered.

## 2025-05-09 ENCOUNTER — APPOINTMENT (OUTPATIENT)
Dept: CARDIOLOGY | Facility: HOSPITAL | Age: 85
DRG: 292 | End: 2025-05-09
Payer: MEDICARE

## 2025-05-09 ENCOUNTER — APPOINTMENT (OUTPATIENT)
Dept: ULTRASOUND IMAGING | Facility: HOSPITAL | Age: 85
DRG: 292 | End: 2025-05-09
Payer: MEDICARE

## 2025-05-09 LAB
ALBUMIN SERPL-MCNC: 3.2 G/DL (ref 3.5–5.2)
ANION GAP SERPL CALCULATED.3IONS-SCNC: 18 MMOL/L (ref 5–15)
AORTIC DIMENSIONLESS INDEX: 0.93 (DI)
ASCENDING AORTA: 3.5 CM
AV MEAN PRESS GRAD SYS DOP V1V2: 3 MMHG
AV VMAX SYS DOP: 118 CM/SEC
BASOPHILS # BLD AUTO: 0.06 10*3/MM3 (ref 0–0.2)
BASOPHILS NFR BLD AUTO: 0.5 % (ref 0–1.5)
BH CV ECHO MEAS - ACS: 1.74 CM
BH CV ECHO MEAS - AO MAX PG: 5.6 MMHG
BH CV ECHO MEAS - AO ROOT DIAM: 3 CM
BH CV ECHO MEAS - AO V2 VTI: 23.2 CM
BH CV ECHO MEAS - AVA(I,D): 3.4 CM2
BH CV ECHO MEAS - EDV(CUBED): 130.1 ML
BH CV ECHO MEAS - EDV(MOD-SP2): 123 ML
BH CV ECHO MEAS - EDV(MOD-SP4): 134 ML
BH CV ECHO MEAS - EF(MOD-SP2): 63.4 %
BH CV ECHO MEAS - EF(MOD-SP4): 57.5 %
BH CV ECHO MEAS - ESV(CUBED): 30.4 ML
BH CV ECHO MEAS - ESV(MOD-SP2): 45 ML
BH CV ECHO MEAS - ESV(MOD-SP4): 57 ML
BH CV ECHO MEAS - FS: 38.4 %
BH CV ECHO MEAS - IVS/LVPW: 1.22 CM
BH CV ECHO MEAS - IVSD: 1.13 CM
BH CV ECHO MEAS - LAT PEAK E' VEL: 12.3 CM/SEC
BH CV ECHO MEAS - LV DIASTOLIC VOL/BSA (35-75): 63.2 CM2
BH CV ECHO MEAS - LV MASS(C)D: 191.9 GRAMS
BH CV ECHO MEAS - LV MAX PG: 3.9 MMHG
BH CV ECHO MEAS - LV MEAN PG: 2 MMHG
BH CV ECHO MEAS - LV SYSTOLIC VOL/BSA (12-30): 26.9 CM2
BH CV ECHO MEAS - LV V1 MAX: 98.5 CM/SEC
BH CV ECHO MEAS - LV V1 VTI: 21.6 CM
BH CV ECHO MEAS - LVIDD: 5.1 CM
BH CV ECHO MEAS - LVIDS: 3.1 CM
BH CV ECHO MEAS - LVOT AREA: 3.6 CM2
BH CV ECHO MEAS - LVOT DIAM: 2.14 CM
BH CV ECHO MEAS - LVPWD: 0.92 CM
BH CV ECHO MEAS - MED PEAK E' VEL: 6.9 CM/SEC
BH CV ECHO MEAS - MV A DUR: 0.1 SEC
BH CV ECHO MEAS - MV A MAX VEL: 118.1 CM/SEC
BH CV ECHO MEAS - MV DEC SLOPE: 452.2 CM/SEC2
BH CV ECHO MEAS - MV DEC TIME: 0.24 SEC
BH CV ECHO MEAS - MV E MAX VEL: 113 CM/SEC
BH CV ECHO MEAS - MV E/A: 0.96
BH CV ECHO MEAS - MV MAX PG: 5.9 MMHG
BH CV ECHO MEAS - MV MEAN PG: 3 MMHG
BH CV ECHO MEAS - MV P1/2T: 80.4 MSEC
BH CV ECHO MEAS - MV V2 VTI: 28.2 CM
BH CV ECHO MEAS - MVA(P1/2T): 2.7 CM2
BH CV ECHO MEAS - MVA(VTI): 2.8 CM2
BH CV ECHO MEAS - PA ACC TIME: 0.1 SEC
BH CV ECHO MEAS - PA V2 MAX: 137.7 CM/SEC
BH CV ECHO MEAS - PULM A REVS DUR: 0.12 SEC
BH CV ECHO MEAS - PULM A REVS VEL: 34.4 CM/SEC
BH CV ECHO MEAS - PULM DIAS VEL: 54.6 CM/SEC
BH CV ECHO MEAS - PULM S/D: 1.2
BH CV ECHO MEAS - PULM SYS VEL: 65.6 CM/SEC
BH CV ECHO MEAS - QP/QS: 1.99
BH CV ECHO MEAS - RV MAX PG: 5.6 MMHG
BH CV ECHO MEAS - RV V1 MAX: 118.1 CM/SEC
BH CV ECHO MEAS - RV V1 VTI: 24.5 CM
BH CV ECHO MEAS - RVOT DIAM: 2.8 CM
BH CV ECHO MEAS - SV(LVOT): 77.8 ML
BH CV ECHO MEAS - SV(MOD-SP2): 78 ML
BH CV ECHO MEAS - SV(MOD-SP4): 77 ML
BH CV ECHO MEAS - SV(RVOT): 155.1 ML
BH CV ECHO MEAS - SVI(LVOT): 36.7 ML/M2
BH CV ECHO MEAS - SVI(MOD-SP2): 36.8 ML/M2
BH CV ECHO MEAS - SVI(MOD-SP4): 36.3 ML/M2
BH CV ECHO MEAS - TAPSE (>1.6): 2.6 CM
BH CV ECHO MEASUREMENTS AVERAGE E/E' RATIO: 11.77
BH CV XLRA - RV BASE: 3.5 CM
BH CV XLRA - RV LENGTH: 8.4 CM
BH CV XLRA - RV MID: 2.7 CM
BH CV XLRA - TDI S': 13.3 CM/SEC
BUN SERPL-MCNC: 75 MG/DL (ref 8–23)
BUN/CREAT SERPL: 20.8 (ref 7–25)
C AURIS DNA SPEC QL NAA+NON-PROBE: NOT DETECTED
CALCIUM SPEC-SCNC: 8.1 MG/DL (ref 8.6–10.5)
CHLORIDE SERPL-SCNC: 106 MMOL/L (ref 98–107)
CO2 SERPL-SCNC: 15 MMOL/L (ref 22–29)
CREAT SERPL-MCNC: 3.6 MG/DL (ref 0.57–1)
DEPRECATED RDW RBC AUTO: 46 FL (ref 37–54)
EGFRCR SERPLBLD CKD-EPI 2021: 12 ML/MIN/1.73
EOSINOPHIL # BLD AUTO: 0.16 10*3/MM3 (ref 0–0.4)
EOSINOPHIL NFR BLD AUTO: 1.2 % (ref 0.3–6.2)
ERYTHROCYTE [DISTWIDTH] IN BLOOD BY AUTOMATED COUNT: 15.3 % (ref 12.3–15.4)
GLUCOSE BLDC GLUCOMTR-MCNC: 186 MG/DL (ref 70–130)
GLUCOSE BLDC GLUCOMTR-MCNC: 260 MG/DL (ref 70–130)
GLUCOSE BLDC GLUCOMTR-MCNC: 275 MG/DL (ref 70–130)
GLUCOSE SERPL-MCNC: 123 MG/DL (ref 65–99)
HCT VFR BLD AUTO: 26 % (ref 34–46.6)
HGB BLD-MCNC: 8.6 G/DL (ref 12–15.9)
IMM GRANULOCYTES # BLD AUTO: 0.09 10*3/MM3 (ref 0–0.05)
IMM GRANULOCYTES NFR BLD AUTO: 0.7 % (ref 0–0.5)
IRON 24H UR-MRATE: 15 MCG/DL (ref 37–145)
IRON SATN MFR SERPL: 6 % (ref 20–50)
LEFT ATRIUM VOLUME INDEX: 31.6 ML/M2
LV EF BIPLANE MOD: 60.6 %
LYMPHOCYTES # BLD AUTO: 0.85 10*3/MM3 (ref 0.7–3.1)
LYMPHOCYTES NFR BLD AUTO: 6.5 % (ref 19.6–45.3)
MAGNESIUM SERPL-MCNC: 1.6 MG/DL (ref 1.6–2.4)
MCH RBC QN AUTO: 27.7 PG (ref 26.6–33)
MCHC RBC AUTO-ENTMCNC: 33.1 G/DL (ref 31.5–35.7)
MCV RBC AUTO: 83.6 FL (ref 79–97)
MONOCYTES # BLD AUTO: 1.26 10*3/MM3 (ref 0.1–0.9)
MONOCYTES NFR BLD AUTO: 9.7 % (ref 5–12)
NEUTROPHILS NFR BLD AUTO: 10.57 10*3/MM3 (ref 1.7–7)
NEUTROPHILS NFR BLD AUTO: 81.4 % (ref 42.7–76)
NRBC BLD AUTO-RTO: 0 /100 WBC (ref 0–0.2)
PHOSPHATE SERPL-MCNC: 5.3 MG/DL (ref 2.5–4.5)
PLATELET # BLD AUTO: 297 10*3/MM3 (ref 140–450)
PMV BLD AUTO: 9.8 FL (ref 6–12)
POTASSIUM SERPL-SCNC: 3.8 MMOL/L (ref 3.5–5.2)
RBC # BLD AUTO: 3.11 10*6/MM3 (ref 3.77–5.28)
SINUS: 3.2 CM
SODIUM SERPL-SCNC: 139 MMOL/L (ref 136–145)
STJ: 2.7 CM
TIBC SERPL-MCNC: 247 MCG/DL (ref 298–536)
TRANSFERRIN SERPL-MCNC: 166 MG/DL (ref 200–360)
URATE SERPL-MCNC: 12 MG/DL (ref 2.4–5.7)
WBC NRBC COR # BLD AUTO: 12.99 10*3/MM3 (ref 3.4–10.8)

## 2025-05-09 PROCEDURE — 25810000003 SODIUM CHLORIDE 0.9 % SOLUTION: Performed by: INTERNAL MEDICINE

## 2025-05-09 PROCEDURE — 63710000001 INSULIN GLARGINE PER 5 UNITS: Performed by: INTERNAL MEDICINE

## 2025-05-09 PROCEDURE — 99232 SBSQ HOSP IP/OBS MODERATE 35: CPT | Performed by: SURGERY

## 2025-05-09 PROCEDURE — 85025 COMPLETE CBC W/AUTO DIFF WBC: CPT

## 2025-05-09 PROCEDURE — 83735 ASSAY OF MAGNESIUM: CPT

## 2025-05-09 PROCEDURE — 97162 PT EVAL MOD COMPLEX 30 MIN: CPT

## 2025-05-09 PROCEDURE — 93306 TTE W/DOPPLER COMPLETE: CPT

## 2025-05-09 PROCEDURE — 82948 REAGENT STRIP/BLOOD GLUCOSE: CPT

## 2025-05-09 PROCEDURE — 97530 THERAPEUTIC ACTIVITIES: CPT

## 2025-05-09 PROCEDURE — 84466 ASSAY OF TRANSFERRIN: CPT | Performed by: INTERNAL MEDICINE

## 2025-05-09 PROCEDURE — 25010000002 NA FERRIC GLUC CPLX PER 12.5 MG: Performed by: INTERNAL MEDICINE

## 2025-05-09 PROCEDURE — 83540 ASSAY OF IRON: CPT | Performed by: INTERNAL MEDICINE

## 2025-05-09 PROCEDURE — 63710000001 INSULIN LISPRO (HUMAN) PER 5 UNITS: Performed by: INTERNAL MEDICINE

## 2025-05-09 PROCEDURE — 84550 ASSAY OF BLOOD/URIC ACID: CPT

## 2025-05-09 PROCEDURE — 76775 US EXAM ABDO BACK WALL LIM: CPT

## 2025-05-09 PROCEDURE — 80069 RENAL FUNCTION PANEL: CPT

## 2025-05-09 PROCEDURE — 25010000002 HYDROMORPHONE PER 4 MG: Performed by: INTERNAL MEDICINE

## 2025-05-09 PROCEDURE — 99222 1ST HOSP IP/OBS MODERATE 55: CPT | Performed by: INTERNAL MEDICINE

## 2025-05-09 PROCEDURE — 25510000001 PERFLUTREN 6.52 MG/ML SUSPENSION 2 ML VIAL: Performed by: INTERNAL MEDICINE

## 2025-05-09 PROCEDURE — 93306 TTE W/DOPPLER COMPLETE: CPT | Performed by: INTERNAL MEDICINE

## 2025-05-09 RX ADMIN — HYDROCODONE BITARTRATE AND ACETAMINOPHEN 1 TABLET: 7.5; 325 TABLET ORAL at 22:06

## 2025-05-09 RX ADMIN — PREGABALIN 25 MG: 25 CAPSULE ORAL at 22:06

## 2025-05-09 RX ADMIN — BISOPROLOL FUMARATE 5 MG: 5 TABLET ORAL at 12:03

## 2025-05-09 RX ADMIN — HYDROCODONE BITARTRATE AND ACETAMINOPHEN 1 TABLET: 7.5; 325 TABLET ORAL at 12:36

## 2025-05-09 RX ADMIN — HYDRALAZINE HYDROCHLORIDE 50 MG: 50 TABLET ORAL at 12:02

## 2025-05-09 RX ADMIN — INSULIN LISPRO 6 UNITS: 100 INJECTION, SOLUTION INTRAVENOUS; SUBCUTANEOUS at 12:37

## 2025-05-09 RX ADMIN — Medication 10 ML: at 12:04

## 2025-05-09 RX ADMIN — PREGABALIN 25 MG: 25 CAPSULE ORAL at 12:03

## 2025-05-09 RX ADMIN — Medication 10 ML: at 22:07

## 2025-05-09 RX ADMIN — SODIUM BICARBONATE 650 MG: 650 TABLET ORAL at 22:06

## 2025-05-09 RX ADMIN — SODIUM BICARBONATE 650 MG: 650 TABLET ORAL at 12:02

## 2025-05-09 RX ADMIN — SODIUM CHLORIDE 250 MG: 9 INJECTION, SOLUTION INTRAVENOUS at 22:00

## 2025-05-09 RX ADMIN — ATORVASTATIN CALCIUM 80 MG: 80 TABLET, FILM COATED ORAL at 22:06

## 2025-05-09 RX ADMIN — INSULIN LISPRO 2 UNITS: 100 INJECTION, SOLUTION INTRAVENOUS; SUBCUTANEOUS at 22:04

## 2025-05-09 RX ADMIN — INSULIN LISPRO 6 UNITS: 100 INJECTION, SOLUTION INTRAVENOUS; SUBCUTANEOUS at 17:27

## 2025-05-09 RX ADMIN — PERFLUTREN 2 ML: 6.52 INJECTION, SUSPENSION INTRAVENOUS at 08:46

## 2025-05-09 RX ADMIN — SODIUM BICARBONATE 650 MG: 650 TABLET ORAL at 17:27

## 2025-05-09 RX ADMIN — INSULIN GLARGINE 90 UNITS: 100 INJECTION, SOLUTION SUBCUTANEOUS at 12:03

## 2025-05-09 RX ADMIN — HYDRALAZINE HYDROCHLORIDE 50 MG: 50 TABLET ORAL at 22:05

## 2025-05-09 RX ADMIN — PANTOPRAZOLE SODIUM 40 MG: 40 TABLET, DELAYED RELEASE ORAL at 06:23

## 2025-05-09 RX ADMIN — LEVOTHYROXINE SODIUM 137 MCG: 137 TABLET ORAL at 06:23

## 2025-05-09 RX ADMIN — FERROUS SULFATE TAB 325 MG (65 MG ELEMENTAL FE) 325 MG: 325 (65 FE) TAB at 12:03

## 2025-05-09 NOTE — PLAN OF CARE
Goal Outcome Evaluation:              Outcome Evaluation: Still c/o pain BLE which is red and edematous.Abx iv . VSS.

## 2025-05-09 NOTE — CONSULTS
Referring Provider: No referring provider defined for this encounter.  Reason for Consultation: Concern for bilateral lower extremity cellulitis    Subjective   History of present illness: This is a 84-year-old female with neuroendocrine pancreatic tumor, CKD 3, hypertension, hyperlipidemia, bilateral lymphedema and poorly controlled type 2 diabetes who was admitted on May 7 with bilateral lower extremity swelling and pain.  Admission labs revealed a normal white blood cell count.  Patient denied any fevers.  There was concern for cellulitis and the patient was empirically started on ceftriaxone.  She has been seen by vascular surgery who believe her presentation is consistent with ischemia  Currently the patient reports significant pain in her lower extremities bilaterally.  Denies diarrhea or rash.      Past Medical History:   Diagnosis Date    Anxiety     Arthritis     Benign essential hypertension 08/20/2014    Bleeding disorder     Depression     Diabetes mellitus, type 2     Disc degeneration, lumbar     Headache, tension-type     Hyperlipidemia     Hypothyroidism     Neuropathy     Osteoporosis 09/09/2015    Pancreatic neuroendocrine tumor.     Sept 2023, on Monthly Octreotide Injection    Peripheral neuropathy     Spinal stenosis    CKD 3    Past Surgical History:   Procedure Laterality Date    APPENDECTOMY      BILATERAL BREAST REDUCTION Bilateral 08/2015    CATARACT EXTRACTION  03/2015    CHOLECYSTECTOMY WITH INTRAOPERATIVE CHOLANGIOGRAM N/A 3/27/2022    KYPHOPLASTY      TONSILLECTOMY          reports that she quit smoking about 12 years ago. Her smoking use included cigarettes. She started smoking about 52 years ago. She has a 40 pack-year smoking history. She has been exposed to tobacco smoke. She has never used smokeless tobacco. She reports that she does not drink alcohol and does not use drugs.    family history includes Bone cancer in her mother; Heart disease in her daughter; No Known Problems in  her father.    Allergies   Allergen Reactions    Sulfa Antibiotics Unknown - High Severity     Pt says it was a long time ago and I don't remember but it wasn't good.        Medication:  Antibiotics:  Ceftriaxone 2 g IV every 24 hours    Please refer to the medical record for a full medication list    Review of Systems  Pertinent items are noted in HPI, all other systems reviewed and negative    Objective   Vital Signs   Temp:  [98 °F (36.7 °C)-98.4 °F (36.9 °C)] 98.4 °F (36.9 °C)  Heart Rate:  [85-90] 90  Resp:  [17] 17  BP: (134-179)/(43-70) 140/70    Physical Exam:   General: Frail and chronically ill-appearing  HEENT: Normocephalic, atraumatic, no scleral icterus.   Neck: Supple, trachea is midline  Cardiovascular: Normal rate, regular rhythm  Respiratory: Bibasilar crackles  GI: Abdomen is soft, nontender, nondistended, positive  Musculoskeletal: no edema, tenderness or deformity  Skin: No rashes bilateral lower extremity swelling, wounds on left toes and right shin no purulence  Neurological: Alert and oriented, moving all 4 extremities  Psychiatric: Normal mood and affect     Results Review:   I reviewed the patient's new clinical results.  I reviewed the patient's new imaging results and agree with the interpretation.    Lab Results   Component Value Date    WBC 12.99 (H) 05/09/2025    HGB 8.6 (L) 05/09/2025    HCT 26.0 (L) 05/09/2025    MCV 83.6 05/09/2025     05/09/2025       Lab Results   Component Value Date    GLUCOSE 123 (H) 05/09/2025    BUN 75 (H) 05/09/2025    CREATININE 3.60 (H) 05/09/2025    EGFRIFNONA 51 (L) 02/28/2022    EGFRIFAFRI 50 (L) 01/18/2021    BCR 20.8 05/09/2025    CO2 15.0 (L) 05/09/2025    CALCIUM 8.1 (L) 05/09/2025    ALBUMIN 3.2 (L) 05/09/2025    AST 8 04/17/2025    ALT 5 04/17/2025         Microbiology:  5/8 BCx NGTD x 2      1/16 R leg wound Proteus, stenotrophomonas, Klebsiella, VRE Enterococcus faecalis    Radiology:  Admission chest x-ray shows findings concerning for  vascular congestion.  No acute infiltrates    Dopplers right and left WILLIS severely reduced.    Assessment & Plan   Lower extremity pain and swelling due to ischemia  Right lower extremity wounds with secondary infection  Leukocytosis  Poorly controlled type 2 diabetes  CKD 3 complicating above    Vascular surgery note reviewed.  I agreed that her lower extremity pain and swelling are due to her lymphedema as well as ischemia.  No evidence of infection at this time.  Will discontinue empiric ceftriaxone.  Patient denies any urinary symptoms and therefore does not warrant a urine culture.  She does not have a urinary tract infection.    Thank you for this consult.  ID will sign off.  Please do not hesitate to call us with further questions or concerns    Patient is a nursing home resident therefore Candida auris testing is indicated continue contact isolation while awaiting results      I discussed the patient's findings and my recommendations with patient and nursing staff

## 2025-05-09 NOTE — PLAN OF CARE
Goal Outcome Evaluation:  Plan of Care Reviewed With: patient           Outcome Evaluation: Pt is a 85 yo F who [place] with acute on chronic renal failure. Pt also wth painful, edematous B LEs. Pt presnts to PT with impaired functiona lmobility and gait secondary to generalized weakness, impaired balance, and decreased activity tolrance. Pt may benefit from skilled PT to address strength, mobility, and gait.    Anticipated Discharge Disposition (PT): skilled nursing facility

## 2025-05-09 NOTE — PROGRESS NOTES
[facility] Progress Note      Patient Name: Halie Guidry  : 1940  MRN: [id number]  Primary Care Physician:  Rossy Laird APRN  Date of admission: 2025    Subjective     Interval History:   The patient was seen and examined today for follow-up on VIPUL   Continues to complain of bilateral lower extremity pain.  Denies any nausea or vomiting.  No fever no chills  Off oxygen, denies any chest pain and no shortness of breath  Review of Systems:   As noted above    Objective     Vitals:   Temp:  [98 °F (36.7 °C)-98.4 °F (36.9 °C)] 98.4 °F (36.9 °C)  Heart Rate:  [85-90] 90  Resp:  [17] 17  BP: (134-179)/(43-70) 140/70    Intake/Output Summary (Last 24 hours) at 2025 1152  Last data filed at 2025 0625  Gross per 24 hour   Intake 1130 ml   Output 1850 ml   Net -720 ml       Physical Exam:    General Appearance: Ill looking  Skin: warm and dry  HEENT: oral mucosa normal, nonicteric sclera  Neck: supple, no JVD  Lungs: CTA  Heart: RRR, normal S1 and S2  Abdomen: soft, nontender, nondistended  : no palpable bladder  Extremities:  BLE edema with erythema; ulcer to left leg.  Edema improved  Neuro: normal speech and mental status     Scheduled Meds:     [Held by provider] amLODIPine, 5 mg, Oral, Q24H  atorvastatin, 80 mg, Oral, Nightly  bisoprolol, 5 mg, Oral, Daily  cefTRIAXone, 2,000 mg, Intravenous, Q24H  ferrous sulfate, 325 mg, Oral, Every Other Day  [Held by provider] furosemide, 80 mg, Intravenous, BID Diuretics  hydrALAZINE, 50 mg, Oral, Q8H  insulin glargine, 90 Units, Subcutaneous, Daily  insulin lispro, 2-9 Units, Subcutaneous, 4x Daily AC & at Bedtime  levothyroxine, 137 mcg, Oral, Q AM  pantoprazole, 40 mg, Oral, Q AM  pregabalin, 25 mg, Oral, BID  sodium bicarbonate, 650 mg, Oral, TID  sodium chloride, 10 mL, Intravenous, Q12H      IV Meds:        Results Reviewed:   I have personally reviewed the results from the time of this admission to 2025  11:52 EDT     Results from last 7 days   Lab Units 05/09/25  0614 05/08/25 0222 05/07/25  1706   SODIUM mmol/L 139 136 135*   POTASSIUM mmol/L 3.8 4.5 4.5   CHLORIDE mmol/L 106 106 105   CO2 mmol/L 15.0* 14.5* 14.8*   BUN mg/dL 75* 80* 77*   CREATININE mg/dL 3.60* 3.33* 3.46*   CALCIUM mg/dL 8.1* 8.3* 8.2*   GLUCOSE mg/dL 123* 185* 253*       Estimated Creatinine Clearance: 14.5 mL/min (A) (by C-G formula based on SCr of 3.6 mg/dL (H)).    Results from last 7 days   Lab Units 05/09/25 0614 05/08/25 0222 05/07/25  1706   MAGNESIUM mg/dL 1.6  --  1.7   PHOSPHORUS mg/dL 5.3* 5.8*  --        Results from last 7 days   Lab Units 05/09/25 0614 05/08/25 0222   URIC ACID mg/dL 12.0* 10.4*       Results from last 7 days   Lab Units 05/09/25  0614 05/08/25 0222 05/07/25  1706   WBC 10*3/mm3 12.99* 14.00* 10.69   HEMOGLOBIN g/dL 8.6* 8.2* 8.5*   PLATELETS 10*3/mm3 297 314 333             Assessment / Plan     ASSESSMENT:  Nonoliguric VIPUL, suspect secondary to CHF exacerbation.  Patient has volume excess on both exam and imaging.  UA positive for 3+ protein, 4+ bacteria, and TNTC WBC.  Electrolytes within acceptable range except hyperphosphatemia  High anion gap metabolic acidosis associated with normal anion gap metabolic acidosis, secondary to VIPUL/CKD and bicarb loss  CKD stage IIIb, baseline SCr 2.3-2.6, secondary to longstanding hypertensive/diabetic nephrosclerosis and chronic cardiorenal syndrome  HTN with CKD, BP initially elevated, better controlled today  DM2 with CKD managed by primary team  BLE edema/PAD, evaluated by vascular surgery, planning on arterial doppler and  possible CTA with runoff for purposes of limbs salvage  UTI, managed by primary team          PLAN:  Continue to hold amlodipine given bilateral extremity edema.  Discussed with Dr. Kilgore  plan to do CTA with runoff of bilateral lower extremities in the next 48 hours.  High risk of contrast-induced nephropathy and high risk of needing  dialysis  This was discussed with patient in detail  We will hold diuretic therapy  Will consider IV fluids on the day of the CTA  Surveillance labs      Thank you for involving us in the care of Halie JANIE Petersmary.  Please feel free to call with any questions.    Brendon Aguilar MD  05/09/25  11:52 EDT    [facility]  [phone]    Parts of this note may be an electronic transcription/translation of spoken language to printed text using the Dragon dictation system.

## 2025-05-09 NOTE — PROGRESS NOTES
Name: Halie Guidry ADMIT: 2025   : 1940  PCP: Rossy Laird APRN    MRN: [id number] LOS: 1 days   AGE/SEX: 84 y.o. female  ROOM: [facility]     Subjective   Subjective   Patient is lying on the bed and is chronically ill-appearing.  Complaining of bilateral leg/feet pain.  Denies nausea, vomiting, chest pain, shortness of breath, palpitation.       Objective   Objective   Vital Signs  Temp:  [98.1 °F (36.7 °C)-98.4 °F (36.9 °C)] 98.1 °F (36.7 °C)  Heart Rate:  [85-90] 85  Resp:  [17-18] 18  BP: (134-179)/() 140/111  SpO2:  [97 %-98 %] 98 %  on   ;   Device (Oxygen Therapy): room air  Body mass index is 34.07 kg/m².  Physical Exam  HEENT: PERRLA, extract movements intact, status Noctura's  Neck: Supple, no JVD  Cardiovascular: Regular rate and rhythm with normal S1 and S2  Respiratory: Fairly clear to auscultation anteriorly with no wheezes  GI: Soft, nontender, bowel sounds present  Extremities: Positive for edema, bilateral lower extremity erythema noted.  Neurologic: Globally weak, no facial asymmetry, oriented x 3    Results Review     I reviewed the patient's new clinical results.  Results from last 7 days   Lab Units 25  0614 25  1706   WBC 10*3/mm3 12.99* 14.00* 10.69   HEMOGLOBIN g/dL 8.6* 8.2* 8.5*   PLATELETS 10*3/mm3 297 314 333     Results from last 7 days   Lab Units 25  0614 25  0222 25  1706   SODIUM mmol/L 139 136 135*   POTASSIUM mmol/L 3.8 4.5 4.5   CHLORIDE mmol/L 106 106 105   CO2 mmol/L 15.0* 14.5* 14.8*   BUN mg/dL 75* 80* 77*   CREATININE mg/dL 3.60* 3.33* 3.46*   GLUCOSE mg/dL 123* 185* 253*   EGFR mL/min/1.73 12.0* 13.1* 12.6*     Results from last 7 days   Lab Units 25  0614 25  0222   ALBUMIN g/dL 3.2* 3.2*     Results from last 7 days   Lab Units 25  0614 25  1706   CALCIUM mg/dL 8.1* 8.3* 8.2*   ALBUMIN g/dL 3.2* 3.2*  --    MAGNESIUM mg/dL 1.6  --  1.7   PHOSPHORUS mg/dL  5.3* 5.8*  --      Results from last 7 days   Lab Units 05/08/25  0222   LACTATE mmol/L 1.5     Glucose   Date/Time Value Ref Range Status   05/09/2025 1221 260 (H) 70 - 130 mg/dL Final   05/08/2025 2035 292 (H) 70 - 130 mg/dL Final   05/08/2025 1636 161 (H) 70 - 130 mg/dL Final   05/08/2025 1134 238 (H) 70 - 130 mg/dL Final   05/08/2025 0822 232 (H) 70 - 130 mg/dL Final       No radiology results for the last day    I have personally reviewed all medications:  Scheduled Medications  [Held by provider] amLODIPine, 5 mg, Oral, Q24H  atorvastatin, 80 mg, Oral, Nightly  bisoprolol, 5 mg, Oral, Daily  ferrous sulfate, 325 mg, Oral, Every Other Day  [Held by provider] furosemide, 80 mg, Intravenous, BID Diuretics  hydrALAZINE, 50 mg, Oral, Q8H  insulin glargine, 90 Units, Subcutaneous, Daily  insulin lispro, 2-9 Units, Subcutaneous, 4x Daily AC & at Bedtime  levothyroxine, 137 mcg, Oral, Q AM  pantoprazole, 40 mg, Oral, Q AM  pregabalin, 25 mg, Oral, BID  sodium bicarbonate, 650 mg, Oral, TID  sodium chloride, 10 mL, Intravenous, Q12H    Infusions   Diet  Diet: Cardiac, Renal; Healthy Heart (2-3 Na+); Low Sodium (2-3g), Low Phosphorus; Fluid Consistency: Thin (IDDSI 0)    I have personally reviewed:  [x]  Laboratory   [x]  Microbiology   [x]  Radiology   [x]  EKG/Telemetry  [x]  Cardiology/Vascular   []  Pathology    []  Records       Assessment/Plan     Active Hospital Problems    Diagnosis  POA    **Acute on chronic renal failure [N17.9, N18.9]  Yes    Diabetes [E11.9]  Unknown    PVD (peripheral vascular disease) with claudication [I73.9]  Unknown    Cellulitis [L03.90]  Unknown    Anemia [D64.9]  Unknown    HTN (hypertension) [I10]  Yes    CKD (chronic kidney disease) stage 4, GFR 15-29 ml/min [N18.4]  Yes    Dependent edema [R60.9]  Yes    Acute kidney injury [N17.9]  Yes    Hypothyroidism (acquired) [E03.9]  Yes      Resolved Hospital Problems   No resolved problems to display.       84 y.o. female admitted with  Acute on chronic renal failure.    1. Acute on CKD stage IV, baseline creatinine is around 2.6 and was noted to be 3.46 upon admission.  Home torsemide has been discontinued and nephrology initiated patient on Lasix 80 mg IV every 12 hours with which creatinine initially slightly improved to 3.33 and again worsened 3.60 today.  Therefore IV Lasix has been held.    2.  Diabetes mellitus, blood sugars in 200s thereforeLantus will be further advanced to 100 units and continue with corrective dose insulin.    3.  Hypertension, on bisoprolol, hydralazine.  Home dose Norvasc has been held secondary to lower extremity edema.    4.  Peripheral vascular disease with suspected ischemic rest pain, vascular consult has been obtained and arterial Doppler suggestive of severe disease.  Vascular surgery recommends CT angiogram with runoffs with possible intervention.  Above was discussed with son who is unsure how to proceed given his mother stated no significant medical intervention in the past.  He would like to visit mother to make further decisions.  Palliative care consult will be obtained as well for goals of care discussion.    5.  Bilateral lower respiratory cellulitis, infectious disease evaluated and felt cellulitis is less likely and IV Rocephin has been discontinued.    6.  Bilateral lower extremity edema, 2D echo during his hospital stay revealed normal ejection fraction.    7.  Hypothyroidism, on Synthroid.    8.  Anemia, iron panel is suggestive of iron deficiency therefore will be on IV iron.    9.  Hyperlipidemia, on statins.    10.  CODE STATUS is full code.    11.  Expected discharge date, 5/15/2025.    Copy text on this note has been reviewed by me on 5/9/2025          Kelechi Sanchez MD  [facility]  05/09/25  16:25 EDT

## 2025-05-09 NOTE — NURSING NOTE
05/09/25 1016   Wound 05/09/25 Bilateral gluteal    Placement Date: 05/09/25   Present on Original Admission: Yes  Side: Bilateral  Location: gluteal  Primary Wound Type: (c)    Dressing Appearance open to air   Base blanchable;red;pink   Periwound intact   Edges irregular   Drainage Amount none     Reason for Visit: WOC Team consult for Buttock/ BLE's  Treatment Plan/Recommendations: Patient admitted . With acute renal failure. She was having complaints of her legs hurting. She has wounds to legs. Patient has been seen by vascular and wound care orders written per MD. She needs assist of 2 to reposition. Friction assessed bilateral buttock. Apply z guard. Turn patient every 2 hours.   Wound Team Follow up Plan: Skin prevention protocols and recommendations placed in epic. She will need a pro plus bed.

## 2025-05-09 NOTE — CASE MANAGEMENT/SOCIAL WORK
Continued Stay Note  [facility]     Patient Name: Halie Guidry  MRN: [id number]  Today's Date: 5/9/2025    Admit Date: 5/7/2025    Plan: From AL at [facility]. Needs SNF at D/C. Pt Choice left in room for son too review.   Discharge Plan       Row Name 05/09/25 1724       Plan    Plan From AL at [facility]. Needs SNF at D/C. Pt Choice left in room for son too review.    Patient/Family in Agreement with Plan yes    Plan Comments From AL at [facility] and needs to be an assist of 1 to return. PT recommending SNF. ID consulted and they have signed off. Son to discuss goals of care with pt over weekend so that treatment of lower extremities can be made. Vascular to follow up Monday. Tobias Hicks RN-BC                   Discharge Codes    No documentation.                 Expected Discharge Date and Time       Expected Discharge Date Expected Discharge Time    May 11, 2025               Tobias Hicks RN

## 2025-05-09 NOTE — PROGRESS NOTES
[facility]   Surgical Progress Note    Patient Name: Halie Guidry  : 1940  MRN: [id number]  Date of admission: 2025  Surgical Procedures Since Admission:    Subjective   Subjective     Chief Complaint: Lower extremity wound with ischemic rest pain.    Leg Swelling     Patient resting comfortably denies chest pain or shortness of breath.    Review of Systems    Objective   Objective     Vitals:   Temp:  [98 °F (36.7 °C)-98.4 °F (36.9 °C)] 98.4 °F (36.9 °C)  Heart Rate:  [85-90] 90  Resp:  [17] 17  BP: (134-179)/(43-70) 140/70  Output by Drain (mL) 25 0701 - 25 1900 25 1901 - 25 0700 25 0701 - 25 1352 Range Total   External Urinary Catheter 1500   1500       Physical Exam  Constitutional:       Appearance: She is well-developed.   Pulmonary:      Effort: Pulmonary effort is normal. No respiratory distress.   Abdominal:      General: There is no distension.      Palpations: Abdomen is soft.   Neurological:      Mental Status: She is alert and oriented to person, place, and time.           Result Review    Result Review:  I have personally reviewed the results from the time of this admission to 2025 13:52 EDT and agree with these findings:  [x]  Laboratory list / accordion  []  Microbiology  []  Radiology  []  EKG/Telemetry   []  Cardiology/Vascular   []  Pathology  []  Old records  []  Other:  Most notable findings include:       Results from last 7 days   Lab Units 25  0614 252 25  1706   WBC 10*3/mm3 12.99* 14.00* 10.69   HEMOGLOBIN g/dL 8.6* 8.2* 8.5*   PLATELETS 10*3/mm3 297 314 333     Results from last 7 days   Lab Units 25  0614 25  0222 25  1706   SODIUM mmol/L 139 136 135*   POTASSIUM mmol/L 3.8 4.5 4.5   CHLORIDE mmol/L 106 106 105   CO2 mmol/L 15.0* 14.5* 14.8*   BUN mg/dL 75* 80* 77*   CREATININE mg/dL 3.60* 3.33* 3.46*   GLUCOSE mg/dL 123* 185* 253*   MAGNESIUM mg/dL 1.6  --  1.7   PHOSPHORUS mg/dL 5.3*  5.8*  --    Estimated Creatinine Clearance: 14.5 mL/min (A) (by C-G formula based on SCr of 3.6 mg/dL (H)).    Lab Results   Component Value Date    HGBA1C 9.10 (H) 11/06/2024    HGBA1C 10.30 (H) 10/12/2024    HGBA1C 7.90 (H) 08/24/2024     Glucose   Date/Time Value Ref Range Status   05/09/2025 1221 260 (H) 70 - 130 mg/dL Final   05/08/2025 2035 292 (H) 70 - 130 mg/dL Final   05/08/2025 1636 161 (H) 70 - 130 mg/dL Final   05/08/2025 1134 238 (H) 70 - 130 mg/dL Final   05/08/2025 0822 232 (H) 70 - 130 mg/dL Final       Assessment & Plan   Assessment / Plan     Brief Patient Summary:  Halie Guidry is a 84 y.o. female who combination arterial ischemic and swelling related ulcerations with rest pain bilaterally.        5/7/2025          Active Hospital Problems:   Active Hospital Problems    Diagnosis     **Acute on chronic renal failure     Diabetes     PVD (peripheral vascular disease) with claudication     Cellulitis     Anemia     HTN (hypertension)     CKD (chronic kidney disease) stage 4, GFR 15-29 ml/min     Dependent edema     Acute kidney injury     Hypothyroidism (acquired)      Plan:   Extremely difficult situation given severe acute and chronic medical comorbidities including advanced chronic kidney disease.  Patient has severe chronic arterial insufficiency likely leading to ischemic rest pain.  I personally discussed the case with nephrology.  My initial recommendations were for a CT angiogram with runoff to define anatomy to see if any options for revascularization exist.  My concerns are without revascularization she will progress and require bilateral above-knee amputations.  I have called and discussed the case with the patient's son, Derrick, he is unclear about the best path forward.  He states his mother on multiple previous occasions says that she would just want nature to take its course and did not wish to live.  I have recommended that Derrick come to the hospital and discussed the  overall goals of care with his mother.  Will hold off on CT angiogram for now.  I will check on the patient again on Monday to see if any goals of care decisions have been made.    VTE Prophylaxis:  Mechanical VTE prophylaxis orders are present.        Ke Kilgore MD

## 2025-05-09 NOTE — THERAPY EVALUATION
Patient Name: Halie Guidry  : 1940    MRN: [id number]                              Today's Date: 2025       Admit Date: 2025    Visit Dx:     ICD-10-CM ICD-9-CM   1. Acute renal failure superimposed on chronic kidney disease, unspecified acute renal failure type, unspecified CKD stage  N17.9 584.9    N18.9 585.9   2. Peripheral edema  R60.0 782.3   3. Chronic anemia  D64.9 285.9     Patient Active Problem List   Diagnosis    Compression fracture    Lumbar degenerative disc disease    Type 2 diabetes mellitus with hyperglycemia, with long-term current use of insulin    Hyperlipidemia    Benign essential hypertension    Primary hypothyroidism    Neuropathy    Osteoporosis    Proteinuria    Tobacco abuse    Generalized weakness    Spinal stenosis    Scoliosis    Arthritis    VBI (vertebrobasilar insufficiency)    Orthostatic hypotension    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    Hypothyroidism (acquired)    Noncompliance with medication regimen    Vitamin D deficiency disease    Tremor    Seizure    Thoracic degenerative disc disease    Acute kidney injury    Hyperglycemia    Type 2 diabetes mellitus with hyperglycemia    Lower abdominal pain    Transaminitis    Emphysematous cystitis    Choledocholithiasis    Hypokalemia    Hypoxia    Generalized abdominal pain    History of Clostridium difficile infection    History of ERCP    Hypomagnesemia    Anxiety disorder    Neuropathic pain    Intertrigo    Weakness of both lower extremities    Accelerated hypertension    Acute cystitis without hematuria    Left lower lobe pneumonia    Cellulitis and abscess of left lower extremity    Benign hypertension with CKD (chronic kidney disease) stage IV    Peripheral edema    Primary malignant neuroendocrine tumor of pancreas    Encounter for long-term (current) use of high-risk medication    Diabetic foot ulcer    CKD (chronic kidney disease) stage 4, GFR 15-29 ml/min    Pressure injury of  buttock, stage 2    HTN (hypertension)    Anemia due to chronic kidney disease    Bilateral lower extremity edema    Cellulitis of right lower extremity    CKD (chronic kidney disease) stage 4, GFR 15-29 ml/min    Acute CHF    Bilateral cellulitis of lower leg    Acute UTI    UTI (urinary tract infection)    Acute UTI (urinary tract infection)    Metabolic acidosis    Cobalamin deficiency    Dependent edema    Gastroesophageal reflux disease    Mild neurocognitive disorder    Pancreatic neoplasm    Congestive heart failure    CHF exacerbation    Acute exacerbation of CHF (congestive heart failure)    Hypertensive urgency    Acute on chronic renal failure    Diabetes    PVD (peripheral vascular disease) with claudication    Cellulitis    Anemia     Past Medical History:   Diagnosis Date    Acute metabolic encephalopathy 06/24/2022    Anxiety     Arthritis     Benign essential hypertension 08/20/2014    Bleeding disorder     Depression     Diabetes     Diabetes mellitus, type 2     Disc degeneration, lumbar     Headache, tension-type     Hyperlipidemia     Hypothyroidism     Neuropathy     Osteoporosis 09/09/2015    Pancreatic mass     Sept 2023, on Monthly Octreotide Injection    Peripheral neuropathy     Rotator cuff tear, left     Scoliosis     Shoulder pain     LEFT, TORN ROTATOR CUFF S/P FALL    Spinal stenosis      Past Surgical History:   Procedure Laterality Date    APPENDECTOMY      BILATERAL BREAST REDUCTION Bilateral 08/2015    CATARACT EXTRACTION  03/2015    CHOLECYSTECTOMY WITH INTRAOPERATIVE CHOLANGIOGRAM N/A 3/27/2022    Procedure: CHOLECYSTECTOMY LAPAROSCOPIC INTRAOPERATIVE CHOLANGIOGRAM;  Surgeon: Aiyana Hill MD;  Location: [facility] MAIN OR;  Service: General;  Laterality: N/A;    COLONOSCOPY  06/05/2015    WNL    ERCP N/A 2/25/2022    Procedure: ENDOSCOPIC RETROGRADE CHOLANGIOPANCREATOGRAPHY with sphincterotomy and balloon sweep;  Surgeon: Chilo Wilhelm MD;  Location: [facility] ENDOSCOPY;   Service: Gastroenterology;  Laterality: N/A;  PRE/POST - CBD stones    ERCP N/A 3/28/2022    Procedure: ENDOSCOPIC RETROGRADE CHOLANGIOPANCREATOGRAPHY WITH SPHINCTEROTOMY AND BALLOON SWEEP;  Surgeon: Chilo Wilhelm MD;  Location: [facility] ENDOSCOPY;  Service: Gastroenterology;  Laterality: N/A;  PRE: COMMON DUCT STONE  POST: COMMON DUCT STONE    KYPHOPLASTY      REDUCTION MAMMAPLASTY      TONSILLECTOMY        General Information       Row Name 05/09/25 1351          Physical Therapy Time and Intention    Document Type evaluation  -     Mode of Treatment individual therapy;physical therapy  -       Row Name 05/09/25 1351          General Information    Prior Level of Function independent:;gait;transfer;bed mobility  pt reports she mostly transfers from recliner to wheelchair independently, but does have a walker that she uses PRN  -     Existing Precautions/Restrictions fall  -     Barriers to Rehab medically complex;previous functional deficit  -       Row Name 05/09/25 1351          Living Environment    Current Living Arrangements assisted living facility  -     People in Home facility resident  -       Row Name 05/09/25 1351          Cognition    Orientation Status (Cognition) oriented x 3  -       Row Name 05/09/25 1351          Safety Issues/Impairments Affecting Functional Mobility    Impairments Affecting Function (Mobility) balance;endurance/activity tolerance;strength  -               User Key  (r) = Recorded By, (t) = Taken By, (c) = Cosigned By      Initials Name Provider Type     Liliana Hays, PT Physical Therapist                   Mobility       Row Name 05/09/25 1356          Bed Mobility    Bed Mobility supine-sit;sit-supine  -     Supine-Sit [place] (Bed Mobility) verbal cues;nonverbal cues (demo/gesture);moderate assist (50% patient effort)  -     Sit-Supine [place] (Bed Mobility) verbal cues;nonverbal cues (demo/gesture);moderate assist (50% patient  effort)  -     Assistive Device (Bed Mobility) bed rails;head of bed elevated  -       Row Name 05/09/25 1355          Sit-Stand Transfer    Sit-Stand [place] (Transfers) verbal cues;nonverbal cues (demo/gesture);maximum assist (25% patient effort)  -     Assistive Device (Sit-Stand Transfers) walker, front-wheeled  -     Comment, (Sit-Stand Transfer) pt performed partial stand x2, unable to acheive fully erect position and has strong posterior lean in standing. unable to weight shift to take steps to bsc  -               User Key  (r) = Recorded By, (t) = Taken By, (c) = Cosigned By      Initials Name Provider Type     Liliana Hays, PT Physical Therapist                   Obj/Interventions       Row Name 05/09/25 1400          Range of Motion Comprehensive    General Range of Motion no range of motion deficits identified  -[facility] Name 05/09/25 1400          Strength Comprehensive (MMT)    Comment, General Manual Muscle Testing (MMT) Assessment generalized weakness noted with functional mobility  -       Row Name 05/09/25 1400          Balance    Balance Assessment standing static balance;standing dynamic balance  -     Static Standing Balance verbal cues;non-verbal cues (demo/gesture);maximum assist  -     Dynamic Standing Balance verbal cues;non-verbal cues (demo/gesture);maximum assist  -     Position/Device Used, Standing Balance walker, rolling  -               User Key  (r) = Recorded By, (t) = Taken By, (c) = Cosigned By      Initials Name Provider Type     Liliana Hays, PT Physical Therapist                   Goals/Plan       Row Name 05/09/25 1403          Bed Mobility Goal 1 (PT)    Activity/Assistive Device (Bed Mobility Goal 1, PT) bed mobility activities, all  -     [place] Level/Cues Needed (Bed Mobility Goal 1, PT) supervision required  -     Time Frame (Bed Mobility Goal 1, PT) 1 week  -       Row Name 05/09/25 1403          Transfer Goal 1  (PT)    Activity/Assistive Device (Transfer Goal 1, PT) transfers, all;sit-to-stand/stand-to-sit;bed-to-chair/chair-to-bed;walker, rolling  -CH     [place] Level/Cues Needed (Transfer Goal 1, PT) supervision required  -CH     Time Frame (Transfer Goal 1, PT) 1 week  -CH       Row Name 05/09/25 1403          Gait Training Goal 1 (PT)    Activity/Assistive Device (Gait Training Goal 1, PT) gait (walking locomotion);walker, rolling  -CH     [place] Level (Gait Training Goal 1, PT) contact guard required  -CH     Distance (Gait Training Goal 1, PT) 15  -CH     Time Frame (Gait Training Goal 1, PT) 1 week  -CH       Row Name 05/09/25 1403          Therapy Assessment/Plan (PT)    Planned Therapy Interventions (PT) balance training;bed mobility training;gait training;home exercise program;patient/family education;strengthening;transfer training  -               User Key  (r) = Recorded By, (t) = Taken By, (c) = Cosigned By      Initials Name Provider Type    CH Liliana Hays, PT Physical Therapist                   Clinical Impression       Row Name 05/09/25 1401          Pain    Pretreatment Pain Rating 8/10  -CH     Posttreatment Pain Rating 8/10  -CH     Pain Location extremity  -     Pain Side/Orientation bilateral;lower  -CH     Pain Management Interventions exercise or physical activity utilized  -     Response to Pain Interventions activity participation with increased pain  -       Row Name 05/09/25 1401          Plan of Care Review    Plan of Care Reviewed With patient  -     Outcome Evaluation Pt is a 85 yo F who [place]ad[facility] from [place] with acute on chronic renal failure. Pt also wth painful, edematous B LEs. Pt presnts to PT with impaired functiona lmobility and gait secondary to generalized weakness, impaired balance, and decreased activity tolrance. Pt may benefit from skilled PT to address strength, mobility, and gait.  -       Row Name 05/09/25 1401          Therapy Assessment/Plan  (PT)    Patient/Family Therapy Goals Statement (PT) to return to [facility]  -     Rehab Potential (PT) good  -     Criteria for Skilled Interventions Met (PT) skilled treatment is necessary  -     Therapy Frequency (PT) 5 times/wk  -       Row Name 05/09/25 1401          Positioning and Restraints    Pre-Treatment Position in bed  -     Post Treatment Position bed  -CH     In Bed supine;call light within reach;encouraged to call for assist;exit alarm on  -               User Key  (r) = Recorded By, (t) = Taken By, (c) = Cosigned By      Initials Name Provider Type     Liliana Hays PT Physical Therapist                   Outcome Measures       Row Name 05/09/25 1404          How much help from another person do you currently need...    Turning from your back to your side while in flat bed without using bedrails? 2  -CH     Moving from lying on back to sitting on the side of a flat bed without bedrails? 2  -CH     Moving to and from a bed to a chair (including a wheelchair)? 1  -CH     Standing up from a chair using your arms (e.g., wheelchair, bedside chair)? 2  -CH     Climbing 3-5 steps with a railing? 1  -CH     To walk in hospital room? 1  -CH     AM-PAC 6 Clicks Score (PT) 9  -     Highest Level of Mobility Goal 3 --> Sit at edge of bed  -       Row Name 05/09/25 1404          Functional Assessment    Outcome Measure Options AM-PAC 6 Clicks Basic Mobility (PT)  -               User Key  (r) = Recorded By, (t) = Taken By, (c) = Cosigned By      Initials Name Provider Type     Liliana Hays PT Physical Therapist                                 Physical Therapy Education       Title: PT OT SLP Therapies (In Progress)       Topic: Physical Therapy (In Progress)       Point: Mobility training (Done)       Learning Progress Summary            Patient Acceptance, E,TB,D, VU,NR by  at 5/9/2025 1404                      Point: Home exercise program (Not Started)       Learner Progress:   Not documented in this visit.              Point: Body mechanics (Done)       Learning Progress Summary            Patient Acceptance, E,TB,D, VU,NR by  at 5/9/2025 1404                      Point: Precautions (Done)       Learning Progress Summary            Patient Acceptance, E,TB,D, VU,NR by  at 5/9/2025 1404                                      User Key       Initials Effective Dates Name Provider Type Discipline     06/16/21 -  Liliana Hays PT Physical Therapist PT                  PT Recommendation and Plan  Planned Therapy Interventions (PT): balance training, bed mobility training, gait training, home exercise program, patient/family education, strengthening, transfer training  Outcome Evaluation: Pt is a 83 yo F who wasadmitted from [place] with acute on chronic renal failure. Pt also wth painful, edematous B LEs. Pt presnts to PT with impaired functiona lmobility and gait secondary to generalized weakness, impaired balance, and decreased activity tolrance. Pt may benefit from skilled PT to address strength, mobility, and gait.     Time Calculation:         PT Charges       Row Name 05/09/25 1404             Time Calculation    Start Time 1040  -      Stop Time 1118  -      Time Calculation (min) 38 min  -      PT Received On 05/09/25  -      PT - Next Appointment 05/12/25  -      PT Goal Re-Cert Due Date 05/16/25  -         Time Calculation- PT    Total Timed Code Minutes- PT 30 minute(s)  -         Timed Charges    13580 - PT Therapeutic Activity Minutes 30  -CH         Total Minutes    Timed Charges Total Minutes 30  -CH       Total Minutes 30  -CH                User Key  (r) = Recorded By, (t) = Taken By, (c) = Cosigned By      Initials Name Provider Type     Liliana Hays PT Physical Therapist                  Therapy Charges for Today       Code Description Service Date Service Provider Modifiers Qty    75686814803  PT THERAPEUTIC ACT EA 15 MIN 5/9/2025 Saúl  Liliana BREWSTER, PT GP 2    15514874916 HC PT EVAL MOD COMPLEXITY 3 5/9/2025 Liliana Hays, PT GP 1            PT G-Codes  Outcome Measure Options: AM-PAC 6 Clicks Basic Mobility (PT)  AM-PAC 6 Clicks Score (PT): 9  PT Discharge Summary  Anticipated Discharge Disposition (PT): skilled nursing facility    Liliana Hays, PT  5/9/2025

## 2025-05-10 LAB
ALBUMIN SERPL-MCNC: 3.1 G/DL (ref 3.5–5.2)
ANION GAP SERPL CALCULATED.3IONS-SCNC: 16 MMOL/L (ref 5–15)
BASOPHILS # BLD AUTO: 0.06 10*3/MM3 (ref 0–0.2)
BASOPHILS NFR BLD AUTO: 0.4 % (ref 0–1.5)
BUN SERPL-MCNC: 68 MG/DL (ref 8–23)
BUN/CREAT SERPL: 19.5 (ref 7–25)
CALCIUM SPEC-SCNC: 8.1 MG/DL (ref 8.6–10.5)
CHLORIDE SERPL-SCNC: 108 MMOL/L (ref 98–107)
CO2 SERPL-SCNC: 16 MMOL/L (ref 22–29)
CREAT SERPL-MCNC: 3.49 MG/DL (ref 0.57–1)
DEPRECATED RDW RBC AUTO: 44.9 FL (ref 37–54)
EGFRCR SERPLBLD CKD-EPI 2021: 12.4 ML/MIN/1.73
EOSINOPHIL # BLD AUTO: 0.27 10*3/MM3 (ref 0–0.4)
EOSINOPHIL NFR BLD AUTO: 2 % (ref 0.3–6.2)
ERYTHROCYTE [DISTWIDTH] IN BLOOD BY AUTOMATED COUNT: 15 % (ref 12.3–15.4)
GLUCOSE BLDC GLUCOMTR-MCNC: 147 MG/DL (ref 70–130)
GLUCOSE BLDC GLUCOMTR-MCNC: 160 MG/DL (ref 70–130)
GLUCOSE BLDC GLUCOMTR-MCNC: 194 MG/DL (ref 70–130)
GLUCOSE BLDC GLUCOMTR-MCNC: 234 MG/DL (ref 70–130)
GLUCOSE SERPL-MCNC: 126 MG/DL (ref 65–99)
HCT VFR BLD AUTO: 24.5 % (ref 34–46.6)
HGB BLD-MCNC: 7.9 G/DL (ref 12–15.9)
IMM GRANULOCYTES # BLD AUTO: 0.09 10*3/MM3 (ref 0–0.05)
IMM GRANULOCYTES NFR BLD AUTO: 0.7 % (ref 0–0.5)
LYMPHOCYTES # BLD AUTO: 0.76 10*3/MM3 (ref 0.7–3.1)
LYMPHOCYTES NFR BLD AUTO: 5.7 % (ref 19.6–45.3)
MCH RBC QN AUTO: 26.7 PG (ref 26.6–33)
MCHC RBC AUTO-ENTMCNC: 32.2 G/DL (ref 31.5–35.7)
MCV RBC AUTO: 82.8 FL (ref 79–97)
MONOCYTES # BLD AUTO: 1.19 10*3/MM3 (ref 0.1–0.9)
MONOCYTES NFR BLD AUTO: 8.9 % (ref 5–12)
NEUTROPHILS NFR BLD AUTO: 11.01 10*3/MM3 (ref 1.7–7)
NEUTROPHILS NFR BLD AUTO: 82.3 % (ref 42.7–76)
NRBC BLD AUTO-RTO: 0 /100 WBC (ref 0–0.2)
PHOSPHATE SERPL-MCNC: 5.5 MG/DL (ref 2.5–4.5)
PLATELET # BLD AUTO: 282 10*3/MM3 (ref 140–450)
PMV BLD AUTO: 9.6 FL (ref 6–12)
POTASSIUM SERPL-SCNC: 3.7 MMOL/L (ref 3.5–5.2)
RBC # BLD AUTO: 2.96 10*6/MM3 (ref 3.77–5.28)
SODIUM SERPL-SCNC: 140 MMOL/L (ref 136–145)
WBC NRBC COR # BLD AUTO: 13.38 10*3/MM3 (ref 3.4–10.8)

## 2025-05-10 PROCEDURE — 82948 REAGENT STRIP/BLOOD GLUCOSE: CPT

## 2025-05-10 PROCEDURE — 25810000003 SODIUM CHLORIDE 0.9 % SOLUTION: Performed by: INTERNAL MEDICINE

## 2025-05-10 PROCEDURE — 85025 COMPLETE CBC W/AUTO DIFF WBC: CPT | Performed by: INTERNAL MEDICINE

## 2025-05-10 PROCEDURE — 63710000001 INSULIN LISPRO (HUMAN) PER 5 UNITS: Performed by: INTERNAL MEDICINE

## 2025-05-10 PROCEDURE — 25010000002 NA FERRIC GLUC CPLX PER 12.5 MG: Performed by: INTERNAL MEDICINE

## 2025-05-10 PROCEDURE — 63710000001 INSULIN GLARGINE PER 5 UNITS: Performed by: INTERNAL MEDICINE

## 2025-05-10 PROCEDURE — 80069 RENAL FUNCTION PANEL: CPT | Performed by: HOSPITALIST

## 2025-05-10 RX ADMIN — Medication 10 ML: at 20:41

## 2025-05-10 RX ADMIN — HYDROCODONE BITARTRATE AND ACETAMINOPHEN 1 TABLET: 7.5; 325 TABLET ORAL at 09:45

## 2025-05-10 RX ADMIN — HYDROCODONE BITARTRATE AND ACETAMINOPHEN 1 TABLET: 7.5; 325 TABLET ORAL at 17:06

## 2025-05-10 RX ADMIN — PANTOPRAZOLE SODIUM 40 MG: 40 TABLET, DELAYED RELEASE ORAL at 06:32

## 2025-05-10 RX ADMIN — INSULIN LISPRO 2 UNITS: 100 INJECTION, SOLUTION INTRAVENOUS; SUBCUTANEOUS at 17:06

## 2025-05-10 RX ADMIN — SODIUM BICARBONATE 650 MG: 650 TABLET ORAL at 17:06

## 2025-05-10 RX ADMIN — INSULIN GLARGINE 100 UNITS: 100 INJECTION, SOLUTION SUBCUTANEOUS at 09:36

## 2025-05-10 RX ADMIN — SODIUM CHLORIDE 250 MG: 9 INJECTION, SOLUTION INTRAVENOUS at 18:07

## 2025-05-10 RX ADMIN — Medication 2.5 MG: at 20:39

## 2025-05-10 RX ADMIN — BISOPROLOL FUMARATE 5 MG: 5 TABLET ORAL at 09:36

## 2025-05-10 RX ADMIN — HYDRALAZINE HYDROCHLORIDE 50 MG: 50 TABLET ORAL at 14:20

## 2025-05-10 RX ADMIN — ATORVASTATIN CALCIUM 80 MG: 80 TABLET, FILM COATED ORAL at 20:39

## 2025-05-10 RX ADMIN — SODIUM BICARBONATE 650 MG: 650 TABLET ORAL at 20:40

## 2025-05-10 RX ADMIN — PREGABALIN 25 MG: 25 CAPSULE ORAL at 20:40

## 2025-05-10 RX ADMIN — HYDRALAZINE HYDROCHLORIDE 50 MG: 50 TABLET ORAL at 20:40

## 2025-05-10 RX ADMIN — INSULIN LISPRO 4 UNITS: 100 INJECTION, SOLUTION INTRAVENOUS; SUBCUTANEOUS at 21:37

## 2025-05-10 RX ADMIN — SODIUM BICARBONATE 650 MG: 650 TABLET ORAL at 09:36

## 2025-05-10 RX ADMIN — INSULIN LISPRO 2 UNITS: 100 INJECTION, SOLUTION INTRAVENOUS; SUBCUTANEOUS at 12:53

## 2025-05-10 RX ADMIN — PREGABALIN 25 MG: 25 CAPSULE ORAL at 09:36

## 2025-05-10 RX ADMIN — HYDRALAZINE HYDROCHLORIDE 50 MG: 50 TABLET ORAL at 09:36

## 2025-05-10 RX ADMIN — LEVOTHYROXINE SODIUM 137 MCG: 137 TABLET ORAL at 06:32

## 2025-05-10 RX ADMIN — Medication 10 ML: at 09:37

## 2025-05-10 NOTE — PROGRESS NOTES
Name: Halie Guidry ADMIT: 2025   : 1940  PCP: Rossy Laird APRN    MRN: [id number] LOS: 2 days   AGE/SEX: 84 y.o. female  ROOM: [facility]     Subjective   Subjective   Patient is lying on the bed and is chronically ill-appearing.  Continues to complain of bilateral leg/feet pain.  Denies nausea, vomiting, chest pain, shortness of breath, palpitation.       Objective   Objective   Vital Signs  Temp:  [97.3 °F (36.3 °C)-98.8 °F (37.1 °C)] 97.3 °F (36.3 °C)  Heart Rate:  [73-82] 73  Resp:  [18-20] 18  BP: (163-188)/(55-70) 174/59  SpO2:  [97 %-100 %] 99 %  on   ;   Device (Oxygen Therapy): room air  Body mass index is 34.07 kg/m².  Physical Exam  HEENT: PERRLA, extract movements intact, status Noctura's  Neck: Supple, no JVD  Cardiovascular: Regular rate and rhythm with normal S1 and S2  Respiratory: Fairly clear to auscultation anteriorly with no wheezes  GI: Soft, nontender, bowel sounds present  Extremities: Positive for edema, bilateral lower extremity erythema noted.  Neurologic: Globally weak, no facial asymmetry, oriented x 3    Results Review     I reviewed the patient's new clinical results.  Results from last 7 days   Lab Units 05/10/25  0314 05/09/25  0614 25  1706   WBC 10*3/mm3 13.38* 12.99* 14.00* 10.69   HEMOGLOBIN g/dL 7.9* 8.6* 8.2* 8.5*   PLATELETS 10*3/mm3 282 297 314 333     Results from last 7 days   Lab Units 05/10/25  03125  0614 25  1706   SODIUM mmol/L 140 139 136 135*   POTASSIUM mmol/L 3.7 3.8 4.5 4.5   CHLORIDE mmol/L 108* 106 106 105   CO2 mmol/L 16.0* 15.0* 14.5* 14.8*   BUN mg/dL 68* 75* 80* 77*   CREATININE mg/dL 3.49* 3.60* 3.33* 3.46*   GLUCOSE mg/dL 126* 123* 185* 253*   EGFR mL/min/1.73 12.4* 12.0* 13.1* 12.6*     Results from last 7 days   Lab Units 05/10/25  0314 05/09/25  0614 05/08/25  0222   ALBUMIN g/dL 3.1* 3.2* 3.2*     Results from last 7 days   Lab Units 05/10/25  0314 05/09/25  0614 05/08/25  0222  05/07/25  1706   CALCIUM mg/dL 8.1* 8.1* 8.3* 8.2*   ALBUMIN g/dL 3.1* 3.2* 3.2*  --    MAGNESIUM mg/dL  --  1.6  --  1.7   PHOSPHORUS mg/dL 5.5* 5.3* 5.8*  --      Results from last 7 days   Lab Units 05/08/25  0222   LACTATE mmol/L 1.5     Glucose   Date/Time Value Ref Range Status   05/10/2025 1219 194 (H) 70 - 130 mg/dL Final   05/10/2025 0843 147 (H) 70 - 130 mg/dL Final   05/09/2025 2028 186 (H) 70 - 130 mg/dL Final   05/09/2025 1648 275 (H) 70 - 130 mg/dL Final   05/09/2025 1221 260 (H) 70 - 130 mg/dL Final   05/08/2025 2035 292 (H) 70 - 130 mg/dL Final   05/08/2025 1636 161 (H) 70 - 130 mg/dL Final       No radiology results for the last day    I have personally reviewed all medications:  Scheduled Medications  [Held by provider] amLODIPine, 5 mg, Oral, Q24H  atorvastatin, 80 mg, Oral, Nightly  bisoprolol, 5 mg, Oral, Daily  ferric gluconate, 250 mg, Intravenous, Q24H  ferrous sulfate, 325 mg, Oral, Every Other Day  [Held by provider] furosemide, 80 mg, Intravenous, BID Diuretics  hydrALAZINE, 50 mg, Oral, Q8H  insulin glargine, 100 Units, Subcutaneous, Daily  insulin lispro, 2-9 Units, Subcutaneous, 4x Daily AC & at Bedtime  levothyroxine, 137 mcg, Oral, Q AM  pantoprazole, 40 mg, Oral, Q AM  pregabalin, 25 mg, Oral, BID  sodium bicarbonate, 650 mg, Oral, TID  sodium chloride, 10 mL, Intravenous, Q12H    Infusions   Diet  Diet: Cardiac, Renal; Healthy Heart (2-3 Na+); Low Sodium (2-3g), Low Phosphorus; Fluid Consistency: Thin (IDDSI 0)    I have personally reviewed:  [x]  Laboratory   [x]  Microbiology   [x]  Radiology   [x]  EKG/Telemetry  [x]  Cardiology/Vascular   []  Pathology    []  Records       Assessment/Plan     Active Hospital Problems    Diagnosis  POA    **Acute on chronic renal failure [N17.9, N18.9]  Yes    Diabetes [E11.9]  Unknown    PVD (peripheral vascular disease) with claudication [I73.9]  Unknown    Cellulitis [L03.90]  Unknown    Anemia [D64.9]  Unknown    HTN (hypertension) [I10]  Yes     CKD (chronic kidney disease) stage 4, GFR 15-29 ml/min [N18.4]  Yes    Dependent edema [R60.9]  Yes    Acute kidney injury [N17.9]  Yes    Hypothyroidism (acquired) [E03.9]  Yes      Resolved Hospital Problems   No resolved problems to display.       84 y.o. female admitted with Acute on chronic renal failure.    1. Acute on CKD stage IV, baseline creatinine is around 2.6 and was noted to be 3.46 upon admission.  Home torsemide has been discontinued and nephrology initiated patient on Lasix 80 mg IV every 12 hours with which creatinine initially slightly improved to 3.33 and again started worsening therefore diuretics were discontinued.  Creatinine did peak at 3.6>3.49 today.  Nephrology following along.    2.  Diabetes mellitus, blood sugars in 200s thereforeLantus will be further advanced to 100 units and continue with corrective dose insulin.    3.  Hypertension, on bisoprolol, hydralazine.  Home dose Norvasc has been held secondary to lower extremity edema.    4.  Peripheral vascular disease with suspected ischemic rest pain, vascular consult has been obtained and arterial Doppler suggestive of severe disease.  Vascular surgery recommends CT angiogram with runoffs with possible intervention.  Vascular discussed above with son(Derrick) who is unsure how to proceed further given his mother stated no significant medical intervention in the past.  He would like to visit mother to make further decisions.  Palliative care consult will be obtained as well for goals of care discussion.    5.  Bilateral lower respiratory cellulitis, infectious disease evaluated and felt cellulitis is less likely and IV Rocephin has been discontinued.    6.  Bilateral lower extremity edema, 2D echo during his hospital stay revealed normal ejection fraction.    7.  Hypothyroidism, on Synthroid.    8.  Anemia, iron panel is suggestive of iron deficiency therefore will be on IV iron.    9.  Hyperlipidemia, on statins.    10.  CODE STATUS  is full code.    11.  Expected discharge date, 5/15/2025.    Copy text on this note has been reviewed by me on 5/10/2025          Kelechi Sanchez MD  [facility]  05/10/25  16:05 EDT

## 2025-05-10 NOTE — PROGRESS NOTES
[facility] Progress Note      Patient Name: Halie Guidry  : 1940  MRN: [id number]  Primary Care Physician:  Rossy Laird APRN  Date of admission: 2025    Subjective     Interval History:   The patient was seen and examined today for follow-up on VIPUL   Continues to complain of bilateral lower extremity pain.  Denies any nausea or vomiting.  No fever no chills  Off oxygen, denies any chest pain and no shortness of breath      Objective     Vitals:   Temp:  [98.1 °F (36.7 °C)-98.8 °F (37.1 °C)] 98.8 °F (37.1 °C)  Heart Rate:  [82-85] 82  Resp:  [18-20] 18  BP: (140-188)/() 163/55    Intake/Output Summary (Last 24 hours) at 5/10/2025 0801  Last data filed at 5/10/2025 0632  Gross per 24 hour   Intake 600 ml   Output 1600 ml   Net -1000 ml       Physical Exam:    General Appearance: Ill looking  Skin: warm and dry  HEENT: oral mucosa normal, nonicteric sclera  Neck: supple, no JVD  Lungs: CTA  Heart: RRR, normal S1 and S2  Abdomen: soft, nontender, nondistended  : no palpable bladder  Extremities:  BLE edema with erythema; ulcer to left leg.  Edema improved  Neuro: normal speech and mental status     Scheduled Meds:     [Held by provider] amLODIPine, 5 mg, Oral, Q24H  atorvastatin, 80 mg, Oral, Nightly  bisoprolol, 5 mg, Oral, Daily  ferric gluconate, 250 mg, Intravenous, Q24H  ferrous sulfate, 325 mg, Oral, Every Other Day  [Held by provider] furosemide, 80 mg, Intravenous, BID Diuretics  hydrALAZINE, 50 mg, Oral, Q8H  insulin glargine, 100 Units, Subcutaneous, Daily  insulin lispro, 2-9 Units, Subcutaneous, 4x Daily AC & at Bedtime  levothyroxine, 137 mcg, Oral, Q AM  pantoprazole, 40 mg, Oral, Q AM  pregabalin, 25 mg, Oral, BID  sodium bicarbonate, 650 mg, Oral, TID  sodium chloride, 10 mL, Intravenous, Q12H      IV Meds:        Results Reviewed:   I have personally reviewed the results from the time of this admission to 5/10/2025 08:01 EDT     Results  from last 7 days   Lab Units 05/10/25  0314 05/09/25  0614 05/08/25  0222   SODIUM mmol/L 140 139 136   POTASSIUM mmol/L 3.7 3.8 4.5   CHLORIDE mmol/L 108* 106 106   CO2 mmol/L 16.0* 15.0* 14.5*   BUN mg/dL 68* 75* 80*   CREATININE mg/dL 3.49* 3.60* 3.33*   CALCIUM mg/dL 8.1* 8.1* 8.3*   GLUCOSE mg/dL 126* 123* 185*       Estimated Creatinine Clearance: 15 mL/min (A) (by C-G formula based on SCr of 3.49 mg/dL (H)).    Results from last 7 days   Lab Units 05/10/25  0314 05/09/25  0614 05/08/25  0222 05/07/25  1706   MAGNESIUM mg/dL  --  1.6  --  1.7   PHOSPHORUS mg/dL 5.5* 5.3* 5.8*  --        Results from last 7 days   Lab Units 05/09/25 0614 05/08/25 0222   URIC ACID mg/dL 12.0* 10.4*       Results from last 7 days   Lab Units 05/10/25  0314 05/09/25  0614 05/08/25  0222 05/07/25  1706   WBC 10*3/mm3 13.38* 12.99* 14.00* 10.69   HEMOGLOBIN g/dL 7.9* 8.6* 8.2* 8.5*   PLATELETS 10*3/mm3 282 297 314 333             Assessment / Plan     ASSESSMENT:  Nonoliguric VIPUL, suspect secondary to CHF exacerbation.  Patient has volume excess on both exam and imaging.  UA positive for 3+ protein, 4+ bacteria, and TNTC WBC.  Electrolytes within acceptable range except hyperphosphatemia  High anion gap metabolic acidosis associated with normal anion gap metabolic acidosis, secondary to VIPUL/CKD and bicarb loss  CKD stage IIIb, baseline SCr 2.3-2.6, secondary to longstanding hypertensive/diabetic nephrosclerosis and chronic cardiorenal syndrome  HTN with CKD, BP initially elevated, better controlled today  DM2 with CKD managed by primary team  BLE edema/PAD, evaluated by vascular surgery, planning on arterial doppler and  possible CTA with runoff for purposes of limbs salvage  UTI, managed by primary team    PLAN:  Continue to hold amlodipine given bilateral extremity edema.   plan to do CTA with runoff of bilateral lower extremities previously, may be delayed for goals of care now.  High risk of contrast-induced nephropathy and  high risk of needing   Surveillance labs      Thank you for involving us in the care of Halie Guidry.  Please feel free to call with any questions.    Raffaele Stevens MD  05/10/25  08:01 EDT    [facility]  [phone]

## 2025-05-10 NOTE — PLAN OF CARE
Goal Outcome Evaluation:              Outcome Evaluation: Confused. Still c/o BLE pain. Swelling improving on both legs but remains red.Iron Iv given as ordered.

## 2025-05-11 LAB
ANION GAP SERPL CALCULATED.3IONS-SCNC: 15.8 MMOL/L (ref 5–15)
BASOPHILS # BLD AUTO: 0.09 10*3/MM3 (ref 0–0.2)
BASOPHILS NFR BLD AUTO: 0.7 % (ref 0–1.5)
BUN SERPL-MCNC: 62 MG/DL (ref 8–23)
BUN/CREAT SERPL: 19.1 (ref 7–25)
CALCIUM SPEC-SCNC: 8.3 MG/DL (ref 8.6–10.5)
CHLORIDE SERPL-SCNC: 110 MMOL/L (ref 98–107)
CO2 SERPL-SCNC: 17.2 MMOL/L (ref 22–29)
CREAT SERPL-MCNC: 3.24 MG/DL (ref 0.57–1)
DEPRECATED RDW RBC AUTO: 46.5 FL (ref 37–54)
EGFRCR SERPLBLD CKD-EPI 2021: 13.6 ML/MIN/1.73
EOSINOPHIL # BLD AUTO: 0.56 10*3/MM3 (ref 0–0.4)
EOSINOPHIL NFR BLD AUTO: 4.2 % (ref 0.3–6.2)
ERYTHROCYTE [DISTWIDTH] IN BLOOD BY AUTOMATED COUNT: 15.3 % (ref 12.3–15.4)
GLUCOSE BLDC GLUCOMTR-MCNC: 161 MG/DL (ref 70–130)
GLUCOSE BLDC GLUCOMTR-MCNC: 164 MG/DL (ref 70–130)
GLUCOSE BLDC GLUCOMTR-MCNC: 191 MG/DL (ref 70–130)
GLUCOSE BLDC GLUCOMTR-MCNC: 55 MG/DL (ref 70–130)
GLUCOSE BLDC GLUCOMTR-MCNC: 56 MG/DL (ref 70–130)
GLUCOSE BLDC GLUCOMTR-MCNC: 79 MG/DL (ref 70–130)
GLUCOSE SERPL-MCNC: 62 MG/DL (ref 65–99)
HCT VFR BLD AUTO: 27.5 % (ref 34–46.6)
HGB BLD-MCNC: 8.6 G/DL (ref 12–15.9)
IMM GRANULOCYTES # BLD AUTO: 0.09 10*3/MM3 (ref 0–0.05)
IMM GRANULOCYTES NFR BLD AUTO: 0.7 % (ref 0–0.5)
LYMPHOCYTES # BLD AUTO: 1.08 10*3/MM3 (ref 0.7–3.1)
LYMPHOCYTES NFR BLD AUTO: 8.2 % (ref 19.6–45.3)
MCH RBC QN AUTO: 26.5 PG (ref 26.6–33)
MCHC RBC AUTO-ENTMCNC: 31.3 G/DL (ref 31.5–35.7)
MCV RBC AUTO: 84.6 FL (ref 79–97)
MONOCYTES # BLD AUTO: 1.24 10*3/MM3 (ref 0.1–0.9)
MONOCYTES NFR BLD AUTO: 9.4 % (ref 5–12)
NEUTROPHILS NFR BLD AUTO: 10.17 10*3/MM3 (ref 1.7–7)
NEUTROPHILS NFR BLD AUTO: 76.8 % (ref 42.7–76)
NRBC BLD AUTO-RTO: 0 /100 WBC (ref 0–0.2)
PLATELET # BLD AUTO: 308 10*3/MM3 (ref 140–450)
PMV BLD AUTO: 9.6 FL (ref 6–12)
POTASSIUM SERPL-SCNC: 4.2 MMOL/L (ref 3.5–5.2)
RBC # BLD AUTO: 3.25 10*6/MM3 (ref 3.77–5.28)
SODIUM SERPL-SCNC: 143 MMOL/L (ref 136–145)
WBC NRBC COR # BLD AUTO: 13.23 10*3/MM3 (ref 3.4–10.8)

## 2025-05-11 PROCEDURE — 63710000001 INSULIN LISPRO (HUMAN) PER 5 UNITS: Performed by: INTERNAL MEDICINE

## 2025-05-11 PROCEDURE — 25010000002 NA FERRIC GLUC CPLX PER 12.5 MG: Performed by: INTERNAL MEDICINE

## 2025-05-11 PROCEDURE — 80048 BASIC METABOLIC PNL TOTAL CA: CPT | Performed by: INTERNAL MEDICINE

## 2025-05-11 PROCEDURE — 82948 REAGENT STRIP/BLOOD GLUCOSE: CPT

## 2025-05-11 PROCEDURE — 85025 COMPLETE CBC W/AUTO DIFF WBC: CPT | Performed by: INTERNAL MEDICINE

## 2025-05-11 PROCEDURE — 25810000003 SODIUM CHLORIDE 0.9 % SOLUTION: Performed by: INTERNAL MEDICINE

## 2025-05-11 PROCEDURE — 63710000001 INSULIN GLARGINE PER 5 UNITS: Performed by: INTERNAL MEDICINE

## 2025-05-11 RX ORDER — ALLOPURINOL 100 MG/1
100 TABLET ORAL DAILY
Status: DISCONTINUED | OUTPATIENT
Start: 2025-05-11 | End: 2025-05-16 | Stop reason: HOSPADM

## 2025-05-11 RX ORDER — HYDRALAZINE HYDROCHLORIDE 50 MG/1
100 TABLET, FILM COATED ORAL EVERY 8 HOURS SCHEDULED
Status: DISCONTINUED | OUTPATIENT
Start: 2025-05-11 | End: 2025-05-16 | Stop reason: HOSPADM

## 2025-05-11 RX ADMIN — HYDRALAZINE HYDROCHLORIDE 50 MG: 50 TABLET ORAL at 05:49

## 2025-05-11 RX ADMIN — FERROUS SULFATE TAB 325 MG (65 MG ELEMENTAL FE) 325 MG: 325 (65 FE) TAB at 08:43

## 2025-05-11 RX ADMIN — SODIUM BICARBONATE 650 MG: 650 TABLET ORAL at 08:43

## 2025-05-11 RX ADMIN — HYDRALAZINE HYDROCHLORIDE 100 MG: 50 TABLET ORAL at 15:08

## 2025-05-11 RX ADMIN — INSULIN LISPRO 2 UNITS: 100 INJECTION, SOLUTION INTRAVENOUS; SUBCUTANEOUS at 17:16

## 2025-05-11 RX ADMIN — SODIUM BICARBONATE 650 MG: 650 TABLET ORAL at 15:08

## 2025-05-11 RX ADMIN — HYDRALAZINE HYDROCHLORIDE 100 MG: 50 TABLET ORAL at 21:39

## 2025-05-11 RX ADMIN — HYDROCODONE BITARTRATE AND ACETAMINOPHEN 1 TABLET: 7.5; 325 TABLET ORAL at 00:38

## 2025-05-11 RX ADMIN — PREGABALIN 25 MG: 25 CAPSULE ORAL at 08:43

## 2025-05-11 RX ADMIN — Medication 10 ML: at 21:39

## 2025-05-11 RX ADMIN — SODIUM CHLORIDE 250 MG: 9 INJECTION, SOLUTION INTRAVENOUS at 17:14

## 2025-05-11 RX ADMIN — ALLOPURINOL 100 MG: 100 TABLET ORAL at 15:08

## 2025-05-11 RX ADMIN — LEVOTHYROXINE SODIUM 137 MCG: 137 TABLET ORAL at 05:50

## 2025-05-11 RX ADMIN — INSULIN LISPRO 2 UNITS: 100 INJECTION, SOLUTION INTRAVENOUS; SUBCUTANEOUS at 21:38

## 2025-05-11 RX ADMIN — PREGABALIN 25 MG: 25 CAPSULE ORAL at 21:39

## 2025-05-11 RX ADMIN — BISOPROLOL FUMARATE 5 MG: 5 TABLET ORAL at 08:43

## 2025-05-11 RX ADMIN — Medication 10 ML: at 08:45

## 2025-05-11 RX ADMIN — INSULIN GLARGINE 100 UNITS: 100 INJECTION, SOLUTION SUBCUTANEOUS at 11:02

## 2025-05-11 RX ADMIN — PANTOPRAZOLE SODIUM 40 MG: 40 TABLET, DELAYED RELEASE ORAL at 05:49

## 2025-05-11 RX ADMIN — HYDROCODONE BITARTRATE AND ACETAMINOPHEN 1 TABLET: 7.5; 325 TABLET ORAL at 15:08

## 2025-05-11 RX ADMIN — ATORVASTATIN CALCIUM 80 MG: 80 TABLET, FILM COATED ORAL at 21:39

## 2025-05-11 RX ADMIN — SODIUM BICARBONATE 650 MG: 650 TABLET ORAL at 21:39

## 2025-05-11 NOTE — PLAN OF CARE
Goal Outcome Evaluation:  Plan of Care Reviewed With: patient           Outcome Evaluation: BP elevated. Remains in constant pain BLE. More oriented.

## 2025-05-11 NOTE — NURSING NOTE
Aide reported BG of 55 at 0745. RN entered room with oral glucose gel and patient refused. Patint reports she would rather try orange juice first. RN gave 480mL of orange juice and will recheck BG in 15 minutes.

## 2025-05-11 NOTE — PROGRESS NOTES
Name: Halie Guidry ADMIT: 2025   : 1940  PCP: Rossy Laird APRN    MRN: [id number] LOS: 3 days   AGE/SEX: 84 y.o. female  ROOM: [facility]     Subjective   Subjective   Patient is lying on the bed and is chronically ill-appearing.    Denies nausea, vomiting, chest pain, shortness of breath, palpitation.  Does complain of bilateral lower extremity pain.       Objective   Objective   Vital Signs  Temp:  [97.5 °F (36.4 °C)-98.2 °F (36.8 °C)] 98.1 °F (36.7 °C)  Heart Rate:  [69-84] 69  Resp:  [16-18] 18  BP: (143-177)/(53-82) 163/76  SpO2:  [92 %-100 %] 92 %  on  Flow (L/min) (Oxygen Therapy):  [2] 2;   Device (Oxygen Therapy): room air  Body mass index is 34.07 kg/m².  Physical Exam  HEENT: PERRLA, extract movements intact, status Noctura's  Neck: Supple, no JVD  Cardiovascular: Regular rate and rhythm with normal S1 and S2  Respiratory: Fairly clear to auscultation anteriorly with no wheezes  GI: Soft, nontender, bowel sounds present  Extremities: Positive for edema, bilateral lower extremity erythema noted.  Neurologic: Globally weak, no facial asymmetry, oriented x 3    Results Review     I reviewed the patient's new clinical results.  Results from last 7 days   Lab Units 05/11/25  0542 05/10/25  0314 05/09/25  0614 05/08/25  0222   WBC 10*3/mm3 13.23* 13.38* 12.99* 14.00*   HEMOGLOBIN g/dL 8.6* 7.9* 8.6* 8.2*   PLATELETS 10*3/mm3 308 282 297 314     Results from last 7 days   Lab Units 05/11/25  0542 05/10/25  0314 05/09/25  0614 05/08/25  0222   SODIUM mmol/L 143 140 139 136   POTASSIUM mmol/L 4.2 3.7 3.8 4.5   CHLORIDE mmol/L 110* 108* 106 106   CO2 mmol/L 17.2* 16.0* 15.0* 14.5*   BUN mg/dL 62* 68* 75* 80*   CREATININE mg/dL 3.24* 3.49* 3.60* 3.33*   GLUCOSE mg/dL 62* 126* 123* 185*   EGFR mL/min/1.73 13.6* 12.4* 12.0* 13.1*     Results from last 7 days   Lab Units 05/10/25  0314 25  0614 25  0222   ALBUMIN g/dL 3.1* 3.2* 3.2*     Results from last 7 days   Lab Units  05/11/25  0542 05/10/25  0314 05/09/25  0614 05/08/25  0222 05/07/25  1706   CALCIUM mg/dL 8.3* 8.1* 8.1* 8.3* 8.2*   ALBUMIN g/dL  --  3.1* 3.2* 3.2*  --    MAGNESIUM mg/dL  --   --  1.6  --  1.7   PHOSPHORUS mg/dL  --  5.5* 5.3* 5.8*  --      Results from last 7 days   Lab Units 05/08/25  0222   LACTATE mmol/L 1.5     Glucose   Date/Time Value Ref Range Status   05/11/2025 1129 161 (H) 70 - 130 mg/dL Final   05/11/2025 0919 79 70 - 130 mg/dL Final   05/11/2025 0828 56 (L) 70 - 130 mg/dL Final   05/11/2025 0747 55 (L) 70 - 130 mg/dL Final   05/10/2025 2038 234 (H) 70 - 130 mg/dL Final   05/10/2025 1653 160 (H) 70 - 130 mg/dL Final   05/10/2025 1219 194 (H) 70 - 130 mg/dL Final       No radiology results for the last day    I have personally reviewed all medications:  Scheduled Medications  allopurinol, 100 mg, Oral, Daily  [Held by provider] amLODIPine, 5 mg, Oral, Q24H  atorvastatin, 80 mg, Oral, Nightly  bisoprolol, 5 mg, Oral, Daily  ferric gluconate, 250 mg, Intravenous, Q24H  ferrous sulfate, 325 mg, Oral, Every Other Day  [Held by provider] furosemide, 80 mg, Intravenous, BID Diuretics  hydrALAZINE, 100 mg, Oral, Q8H  insulin glargine, 100 Units, Subcutaneous, Daily  insulin lispro, 2-9 Units, Subcutaneous, 4x Daily AC & at Bedtime  levothyroxine, 137 mcg, Oral, Q AM  pantoprazole, 40 mg, Oral, Q AM  pregabalin, 25 mg, Oral, BID  sodium bicarbonate, 650 mg, Oral, TID  sodium chloride, 10 mL, Intravenous, Q12H    Infusions   Diet  Diet: Cardiac, Renal; Healthy Heart (2-3 Na+); Low Sodium (2-3g), Low Phosphorus; Fluid Consistency: Thin (IDDSI 0)    I have personally reviewed:  [x]  Laboratory   [x]  Microbiology   [x]  Radiology   [x]  EKG/Telemetry  [x]  Cardiology/Vascular   []  Pathology    []  Records       Assessment/Plan     Active Hospital Problems    Diagnosis  POA    **Acute on chronic renal failure [N17.9, N18.9]  Yes    Diabetes [E11.9]  Unknown    PVD (peripheral vascular disease) with claudication  [I73.9]  Unknown    Cellulitis [L03.90]  Unknown    Anemia [D64.9]  Unknown    HTN (hypertension) [I10]  Yes    CKD (chronic kidney disease) stage 4, GFR 15-29 ml/min [N18.4]  Yes    Dependent edema [R60.9]  Yes    Acute kidney injury [N17.9]  Yes    Hypothyroidism (acquired) [E03.9]  Yes      Resolved Hospital Problems   No resolved problems to display.       84 y.o. female admitted with Acute on chronic renal failure.    1. Acute on CKD stage IV, baseline creatinine is around 2.6 and was noted to be 3.46 upon admission.  Home torsemide has been discontinued and nephrology initiated patient on Lasix 80 mg IV every 12 hours with which creatinine initially slightly improved to 3.33 and again started worsening therefore diuretics were discontinued.  Creatinine did peak at 3.6>3.49>3.24 today.  Nephrology following along.    2.  Diabetes mellitus, blood sugars are dropping into 50s to 60s therefore we will cut back Lantus from 100 units to 70 units.  Continue with corrective dose insulin.    3.  Hypertension, on bisoprolol, hydralazine.  Home dose Norvasc has been held secondary to lower extremity edema.    4.  Peripheral vascular disease with suspected ischemic rest pain, vascular consult has been obtained and arterial Doppler suggestive of severe disease.  Vascular surgery recommends CT angiogram with runoffs with possible intervention.  Vascular discussed above with son(Derrick) who is unsure how to proceed further given his mother stated no significant medical intervention in the past.  He would like to visit mother over the weekend to make further decisions.  Palliative care consult has been obtained as well for goals of care discussion.    5.  Bilateral lower respiratory cellulitis, infectious disease evaluated and felt cellulitis is less likely and IV Rocephin has been discontinued.    6.  Bilateral lower extremity edema, 2D echo during his hospital stay revealed normal ejection fraction.    7.  Hypothyroidism,  on Synthroid.    8.  Anemia, iron panel is suggestive of iron deficiency therefore will be on IV iron.    9.  Hyperlipidemia, on statins.    10.  CODE STATUS is full code.    11.  Expected discharge date, 5/15/2025.    Copy text on this note has been reviewed by me on 5/11/2025          Kelechi Sanchez MD  [facility]  05/11/25  15:14 EDT

## 2025-05-11 NOTE — PROGRESS NOTES
[facility] Progress Note      Patient Name: Halie Guidry  : 1940  MRN: [id number]  Primary Care Physician:  Rossy Laird APRN  Date of admission: 2025    Subjective     Interval History:    Denies any nausea or vomiting.  No fever no chills  Off oxygen, denies any chest pain and no shortness of breath      Objective     Vitals:   Temp:  [97.3 °F (36.3 °C)-98.2 °F (36.8 °C)] 97.5 °F (36.4 °C)  Heart Rate:  [73-84] 73  Resp:  [16-18] 16  BP: (143-177)/(53-82) 177/64  Flow (L/min) (Oxygen Therapy):  [2] 2    Intake/Output Summary (Last 24 hours) at 2025 1011  Last data filed at 2025 0550  Gross per 24 hour   Intake 600 ml   Output 1450 ml   Net -850 ml       Physical Exam:    General Appearance: Ill looking  Skin: warm and dry  HEENT: oral mucosa normal, nonicteric sclera  Neck: supple, no JVD  Lungs: CTA  Heart: RRR, normal S1 and S2  Abdomen: soft, nontender, nondistended  : no palpable bladder  Extremities:  BLE edema with erythema; ulcer to left leg.  Edema improved  Neuro: normal speech and mental status     Scheduled Meds:     [Held by provider] amLODIPine, 5 mg, Oral, Q24H  atorvastatin, 80 mg, Oral, Nightly  bisoprolol, 5 mg, Oral, Daily  ferric gluconate, 250 mg, Intravenous, Q24H  ferrous sulfate, 325 mg, Oral, Every Other Day  [Held by provider] furosemide, 80 mg, Intravenous, BID Diuretics  hydrALAZINE, 50 mg, Oral, Q8H  insulin glargine, 100 Units, Subcutaneous, Daily  insulin lispro, 2-9 Units, Subcutaneous, 4x Daily AC & at Bedtime  levothyroxine, 137 mcg, Oral, Q AM  pantoprazole, 40 mg, Oral, Q AM  pregabalin, 25 mg, Oral, BID  sodium bicarbonate, 650 mg, Oral, TID  sodium chloride, 10 mL, Intravenous, Q12H      IV Meds:        Results Reviewed:   I have personally reviewed the results from the time of this admission to 2025 10:11 EDT     Results from last 7 days   Lab Units 25  0542 05/10/25  0314 25  0614   SODIUM  mmol/L 143 140 139   POTASSIUM mmol/L 4.2 3.7 3.8   CHLORIDE mmol/L 110* 108* 106   CO2 mmol/L 17.2* 16.0* 15.0*   BUN mg/dL 62* 68* 75*   CREATININE mg/dL 3.24* 3.49* 3.60*   CALCIUM mg/dL 8.3* 8.1* 8.1*   GLUCOSE mg/dL 62* 126* 123*       Estimated Creatinine Clearance: 16.1 mL/min (A) (by C-G formula based on SCr of 3.24 mg/dL (H)).    Results from last 7 days   Lab Units 05/10/25  0314 05/09/25  0614 05/08/25 0222 05/07/25  1706   MAGNESIUM mg/dL  --  1.6  --  1.7   PHOSPHORUS mg/dL 5.5* 5.3* 5.8*  --        Results from last 7 days   Lab Units 05/09/25 0614 05/08/25 0222   URIC ACID mg/dL 12.0* 10.4*       Results from last 7 days   Lab Units 05/11/25  0542 05/10/25  0314 05/09/25  0614 05/08/25 0222 05/07/25  1706   WBC 10*3/mm3 13.23* 13.38* 12.99* 14.00* 10.69   HEMOGLOBIN g/dL 8.6* 7.9* 8.6* 8.2* 8.5*   PLATELETS 10*3/mm3 308 282 297 314 333             Assessment / Plan     ASSESSMENT:  Nonoliguric VIPUL, suspect secondary to CHF exacerbation.  Patient has volume excess on both exam and imaging.  UA positive for 3+ protein, 4+ bacteria, and TNTC WBC.  Electrolytes within acceptable range except hyperphosphatemia  High anion gap metabolic acidosis associated with normal anion gap metabolic acidosis, secondary to VIPUL/CKD and bicarb loss  CKD stage IIIb, baseline SCr 2.3-2.6, secondary to longstanding hypertensive/diabetic nephrosclerosis and chronic cardiorenal syndrome  HTN with CKD, BP trending up.  Increase hydralazine.  DM2 with CKD managed by primary team  BLE edema/PAD, evaluated by vascular surgery, planning on  possible CTA with runoff for purposes of limbs salvage  UTI, managed by primary team    PLAN:  Continue to hold amlodipine given bilateral extremity edema.   plan to do CTA with runoff of bilateral lower extremities previously, may be delayed for goals of care now.  High risk of contrast-induced nephropathy and high risk of needing   Surveillance labs  Increase hydralazine to 100mg tid  Add  allopurinol 100mg/day.      Thank you for involving us in the care of Halie LIMA Lorrainemary.  Please feel free to call with any questions.    Raffaele Stevens MD  05/11/25  10:11 EDT    [facility]  [phone]

## 2025-05-12 LAB
ANION GAP SERPL CALCULATED.3IONS-SCNC: 9.6 MMOL/L (ref 5–15)
BASOPHILS # BLD AUTO: 0.09 10*3/MM3 (ref 0–0.2)
BASOPHILS NFR BLD AUTO: 0.6 % (ref 0–1.5)
BUN SERPL-MCNC: 58 MG/DL (ref 8–23)
BUN/CREAT SERPL: 18.1 (ref 7–25)
CALCIUM SPEC-SCNC: 8.6 MG/DL (ref 8.6–10.5)
CHLORIDE SERPL-SCNC: 109 MMOL/L (ref 98–107)
CO2 SERPL-SCNC: 19.4 MMOL/L (ref 22–29)
CREAT SERPL-MCNC: 3.2 MG/DL (ref 0.57–1)
DEPRECATED RDW RBC AUTO: 47.4 FL (ref 37–54)
EGFRCR SERPLBLD CKD-EPI 2021: 13.8 ML/MIN/1.73
EOSINOPHIL # BLD AUTO: 0.43 10*3/MM3 (ref 0–0.4)
EOSINOPHIL NFR BLD AUTO: 3 % (ref 0.3–6.2)
ERYTHROCYTE [DISTWIDTH] IN BLOOD BY AUTOMATED COUNT: 15.4 % (ref 12.3–15.4)
GLUCOSE BLDC GLUCOMTR-MCNC: 188 MG/DL (ref 70–130)
GLUCOSE BLDC GLUCOMTR-MCNC: 238 MG/DL (ref 70–130)
GLUCOSE BLDC GLUCOMTR-MCNC: 346 MG/DL (ref 70–130)
GLUCOSE BLDC GLUCOMTR-MCNC: 73 MG/DL (ref 70–130)
GLUCOSE SERPL-MCNC: 158 MG/DL (ref 65–99)
HCT VFR BLD AUTO: 26.2 % (ref 34–46.6)
HGB BLD-MCNC: 8.4 G/DL (ref 12–15.9)
IMM GRANULOCYTES # BLD AUTO: 0.11 10*3/MM3 (ref 0–0.05)
IMM GRANULOCYTES NFR BLD AUTO: 0.8 % (ref 0–0.5)
LYMPHOCYTES # BLD AUTO: 0.72 10*3/MM3 (ref 0.7–3.1)
LYMPHOCYTES NFR BLD AUTO: 5.1 % (ref 19.6–45.3)
MCH RBC QN AUTO: 27.2 PG (ref 26.6–33)
MCHC RBC AUTO-ENTMCNC: 32.1 G/DL (ref 31.5–35.7)
MCV RBC AUTO: 84.8 FL (ref 79–97)
MONOCYTES # BLD AUTO: 1.01 10*3/MM3 (ref 0.1–0.9)
MONOCYTES NFR BLD AUTO: 7.2 % (ref 5–12)
NEUTROPHILS NFR BLD AUTO: 11.76 10*3/MM3 (ref 1.7–7)
NEUTROPHILS NFR BLD AUTO: 83.3 % (ref 42.7–76)
NRBC BLD AUTO-RTO: 0 /100 WBC (ref 0–0.2)
PLATELET # BLD AUTO: 308 10*3/MM3 (ref 140–450)
PMV BLD AUTO: 9.6 FL (ref 6–12)
POTASSIUM SERPL-SCNC: 4.6 MMOL/L (ref 3.5–5.2)
RBC # BLD AUTO: 3.09 10*6/MM3 (ref 3.77–5.28)
SODIUM SERPL-SCNC: 138 MMOL/L (ref 136–145)
WBC NRBC COR # BLD AUTO: 14.12 10*3/MM3 (ref 3.4–10.8)

## 2025-05-12 PROCEDURE — 80048 BASIC METABOLIC PNL TOTAL CA: CPT | Performed by: INTERNAL MEDICINE

## 2025-05-12 PROCEDURE — 63710000001 INSULIN GLARGINE PER 5 UNITS: Performed by: STUDENT IN AN ORGANIZED HEALTH CARE EDUCATION/TRAINING PROGRAM

## 2025-05-12 PROCEDURE — 82948 REAGENT STRIP/BLOOD GLUCOSE: CPT

## 2025-05-12 PROCEDURE — 63710000001 INSULIN LISPRO (HUMAN) PER 5 UNITS: Performed by: INTERNAL MEDICINE

## 2025-05-12 PROCEDURE — 85025 COMPLETE CBC W/AUTO DIFF WBC: CPT | Performed by: INTERNAL MEDICINE

## 2025-05-12 PROCEDURE — 99232 SBSQ HOSP IP/OBS MODERATE 35: CPT | Performed by: SURGERY

## 2025-05-12 RX ADMIN — SODIUM BICARBONATE 650 MG: 650 TABLET ORAL at 21:20

## 2025-05-12 RX ADMIN — Medication 10 ML: at 21:22

## 2025-05-12 RX ADMIN — Medication 10 ML: at 08:32

## 2025-05-12 RX ADMIN — HYDRALAZINE HYDROCHLORIDE 100 MG: 50 TABLET ORAL at 05:19

## 2025-05-12 RX ADMIN — INSULIN LISPRO 7 UNITS: 100 INJECTION, SOLUTION INTRAVENOUS; SUBCUTANEOUS at 21:22

## 2025-05-12 RX ADMIN — PANTOPRAZOLE SODIUM 40 MG: 40 TABLET, DELAYED RELEASE ORAL at 05:20

## 2025-05-12 RX ADMIN — INSULIN GLARGINE 20 UNITS: 100 INJECTION, SOLUTION SUBCUTANEOUS at 13:47

## 2025-05-12 RX ADMIN — HYDRALAZINE HYDROCHLORIDE 100 MG: 50 TABLET ORAL at 21:20

## 2025-05-12 RX ADMIN — HYDROCODONE BITARTRATE AND ACETAMINOPHEN 1 TABLET: 7.5; 325 TABLET ORAL at 11:52

## 2025-05-12 RX ADMIN — HYDROCODONE BITARTRATE AND ACETAMINOPHEN 1 TABLET: 7.5; 325 TABLET ORAL at 18:54

## 2025-05-12 RX ADMIN — PREGABALIN 25 MG: 25 CAPSULE ORAL at 21:19

## 2025-05-12 RX ADMIN — ALLOPURINOL 100 MG: 100 TABLET ORAL at 08:32

## 2025-05-12 RX ADMIN — BISOPROLOL FUMARATE 5 MG: 5 TABLET ORAL at 08:32

## 2025-05-12 RX ADMIN — PREGABALIN 25 MG: 25 CAPSULE ORAL at 08:32

## 2025-05-12 RX ADMIN — LEVOTHYROXINE SODIUM 137 MCG: 137 TABLET ORAL at 05:20

## 2025-05-12 RX ADMIN — SODIUM BICARBONATE 650 MG: 650 TABLET ORAL at 08:32

## 2025-05-12 RX ADMIN — SODIUM BICARBONATE 650 MG: 650 TABLET ORAL at 17:26

## 2025-05-12 RX ADMIN — ATORVASTATIN CALCIUM 80 MG: 80 TABLET, FILM COATED ORAL at 21:19

## 2025-05-12 RX ADMIN — INSULIN LISPRO 2 UNITS: 100 INJECTION, SOLUTION INTRAVENOUS; SUBCUTANEOUS at 17:26

## 2025-05-12 RX ADMIN — HYDRALAZINE HYDROCHLORIDE 100 MG: 50 TABLET ORAL at 13:47

## 2025-05-12 RX ADMIN — INSULIN LISPRO 4 UNITS: 100 INJECTION, SOLUTION INTRAVENOUS; SUBCUTANEOUS at 11:52

## 2025-05-12 NOTE — PLAN OF CARE
Problem: Adult Inpatient Plan of Care  Goal: Plan of Care Review  Outcome: Progressing  Flowsheets (Taken 5/12/2025 5429)  Progress: no change  Outcome Evaluation: No complaints of pain overnight. Scheduled hydralazine given for elevated BP. Dressing to right leg C/D/I. Pt resting between care overnight. Plan of care ongoing.  Plan of Care Reviewed With: patient

## 2025-05-12 NOTE — CONSULTS
.      [facility]   Inpatient Palliative Care Consultation  Patient Name: Halie Guidry   Patient : 1940   Date: 2025   Consulting Provider: Dr Sanchez  Reason for Consultation: assistance with clarification of goals of care and hospice referral or discussion      Chief Complaint: pain in lower legs/PVD    Information obtained from: patient and relative(s)    Summary of Palliative Illness and Palliative Assessment:  84 y.o. female with chronic kidney diseases stage 4, peripheral artery disease, diabetes mellitus type II, hypertension, hypothyroidism, and pancreatic mass, who presented to the [facility] secondary to increased bilateral lower extremity edema/erythema. Work up in ER, revealed pulmonary vascular congestion, acute kidney injury, and possible cellulitis. She was given IV diuretics and started on IV Rocephin. Consults were placed for vascular surgery, nephrology and infectious disease. She had arterial doppler 2025 that revealed severe chronic arterial insufficiency. Vascular recommended Ct angiogram of lower extremities with runoff for purposes of limb salvage, however risk of contrast induced nephrology sand dialysis dependent renal failure. The patient is not interested in dialysis. Vascular spoke with patient and family and recommended goals of care conversation therefore consult placed for palliative care team.       Symptom Assessment: bilateral lower leg pain    Pain Assessment:   Intensity: 6  Quality:   aching and sharp (intermittent)  Duration: acute and acute on chronic  Location: Legs  Frequency: constant, waxing and waning  Effect on Daily Life: Pain has GREAT impact on daily life  Aggravating Factors: movement and palpation  Relieving Factors: sitting and analgesics  Goal for Pain Management: pain 3/10 or less    Dyspnea Assessment:   Current Level of Dyspnea: 0      Constipation Assessment:   Current Level of Constipation: 0 No  "constipation      [place] Symptom Assessment Scale (ESAS): ESAS completed by Patient and Family caregiver  Pain: 6  Tiredness: 6  Drowsiness: 0 No drowsiness  Nausea: 0 No nausea  Appetite: 0 No lack of appetite  Shortness of breath: 0 No shortness of breath  Depression: 0 No depression  Anxiety: 0 No anxiety  Best Wellbein  Other(Constipation): 0 No constipation    Discussion of Patient/Family Treatment Preferences and Goals of Care: There was a voluntary discussion of advance planning and goals of care discussion. The following were present for the visit: patient and called patient, son (Derrick)    Summary of Discussion: I spoke with patient regarding goals of care. She has fairly good understanding of her conditions. She understands overall long term prognosis is poor. She informs me that she has resided at [facility] since 2024 as it was becoming more difficult for her to remain in home with her son, Derrick curry. We reviewed her acute illness/chronic illness. She remains consistent she is NOT interested in dialysis. She understands if kidney failure and no dialysis then death. We also discuss PVD and options regarding CTA, amputation. She is just wanting to be comfortable. She is not interested in invasive procedures, surgeries. In fact, she shares with me she is just trying to make it to her grandson, Melida levine on May 23  so that she \"roll\" him down the isle at his request and then after anything can happen. I provided her with information regarding palliative care and hospice care. Based on her goals, hospice would be appropriate. I have placed consult. Of note, I am uncertain if [facility] can accept and sent [facility]CP a message. She does have a living will on file that designates son Derrick Loaiza as healthcare surrogate. She reports Derrick is most involved and gave me permission to call him.  The patient and I did discuss CODE status and she wishes to be DNR/DNI, no " dialysis and I have updated the chart to reflect her wishes. I did call her son, Derrick at 1444. He has a fairly good understanding of his mother conditions, which we reviewed. He agrees with hospice consult. He also supports his mothers decision to be DNR/DNI and no dialysis.  I spoke with Kristina Combs RN.         Review of Systems: Review of Systems - Negative except pain in lower legs, fatigue,        Past Medical and Surgical History:    Past Medical History:   Diagnosis Date    Acute metabolic encephalopathy 06/24/2022    Anxiety     Arthritis     Benign essential hypertension 08/20/2014    Bleeding disorder     Depression     Diabetes     Diabetes mellitus, type 2     Disc degeneration, lumbar     Headache, tension-type     Hyperlipidemia     Hypothyroidism     Neuropathy     Osteoporosis 09/09/2015    Pancreatic mass     Sept 2023, on Monthly Octreotide Injection    Peripheral neuropathy     Rotator cuff tear, left     Scoliosis     Shoulder pain     LEFT, TORN ROTATOR CUFF S/P FALL    Spinal stenosis       Past Surgical History:   Procedure Laterality Date    APPENDECTOMY      BILATERAL BREAST REDUCTION Bilateral 08/2015    CATARACT EXTRACTION  03/2015    CHOLECYSTECTOMY WITH INTRAOPERATIVE CHOLANGIOGRAM N/A 3/27/2022    Procedure: CHOLECYSTECTOMY LAPAROSCOPIC INTRAOPERATIVE CHOLANGIOGRAM;  Surgeon: Aiyana Hill MD;  Location: [facility] OR;  Service: General;  Laterality: N/A;    COLONOSCOPY  06/05/2015    WNL    ERCP N/A 2/25/2022    Procedure: ENDOSCOPIC RETROGRADE CHOLANGIOPANCREATOGRAPHY with sphincterotomy and balloon sweep;  Surgeon: Chilo Wilhelm MD;  Location: [facility] ENDOSCOPY;  Service: Gastroenterology;  Laterality: N/A;  PRE/POST - CBD stones    ERCP N/A 3/28/2022    Procedure: ENDOSCOPIC RETROGRADE CHOLANGIOPANCREATOGRAPHY WITH SPHINCTEROTOMY AND BALLOON SWEEP;  Surgeon: Chilo Wilhelm MD;  Location: [facility] ENDOSCOPY;  Service: Gastroenterology;  Laterality: N/A;  PRE: COMMON  "DUCT STONE  POST: COMMON DUCT STONE    KYPHOPLASTY      REDUCTION MAMMAPLASTY      TONSILLECTOMY            Palliative Care Psychosocial and Spiritual Screening    Living situation resides at [facility] assisted living    No spiritual concerns identified      Physical Assessment:   /51 (BP Location: Right arm, Patient Position: Lying)   Pulse 66   Temp 98.8 °F (37.1 °C) (Oral)   Resp 18   Ht 172.7 cm (67.99\")   Wt 102 kg (224 lb)   SpO2 100%   BMI 34.07 kg/m²    Palliative Performance Scale: Performance 50% based on the following measures: Ambulation: Mainly sit or lie down, Activity and Evidence of Disease: Unable to do any work, extensive evidence of disease, Self-Care: Considerable assistance required,  Intake: Normal or reduced, LOC: Full or confusion  Physical Exam  Constitutional:       Appearance: She is obese.   Cardiovascular:      Rate and Rhythm: Normal rate.      Comments: Mild bilateral erythema of lower legs  Pulmonary:      Effort: Pulmonary effort is normal.      Breath sounds: Normal breath sounds.   Abdominal:      General: Bowel sounds are normal.      Palpations: Abdomen is soft.      Tenderness: There is no abdominal tenderness.   Genitourinary:     Comments: Purewick with john urine noted   Musculoskeletal:      Right lower le+ Pitting Edema present.      Left lower le+ Pitting Edema present.   Neurological:      Mental Status: She is alert and oriented to person, place, and time.   Psychiatric:         Attention and Perception: Attention and perception normal.         Mood and Affect: Mood and affect normal.         Speech: Speech normal.         Behavior: Behavior is cooperative.         Thought Content: Thought content normal.         Cognition and Memory: Cognition normal.         Judgment: Judgment normal.            Medications:    Current Facility-Administered Medications:     acetaminophen (TYLENOL) tablet 650 mg, 650 mg, Oral, Q4H PRN **OR** " acetaminophen (TYLENOL) 160 MG/5ML oral solution 650 mg, 650 mg, Oral, Q4H PRN **OR** acetaminophen (TYLENOL) suppository 650 mg, 650 mg, Rectal, Q4H PRN, Kim Barnard MD    allopurinol (ZYLOPRIM) tablet 100 mg, 100 mg, Oral, Daily, Raffeale Stevens MD, 100 mg at 05/12/25 0832    [Held by provider] amLODIPine (NORVASC) tablet 5 mg, 5 mg, Oral, Q24H, Kim Barnard MD, 5 mg at 05/08/25 0910    atorvastatin (LIPITOR) tablet 80 mg, 80 mg, Oral, Nightly, Kim Barnard MD, 80 mg at 05/11/25 2139    sennosides-docusate (PERICOLACE) 8.6-50 MG per tablet 2 tablet, 2 tablet, Oral, BID PRN **AND** polyethylene glycol (MIRALAX) packet 17 g, 17 g, Oral, Daily PRN **AND** bisacodyl (DULCOLAX) EC tablet 5 mg, 5 mg, Oral, Daily PRN **AND** bisacodyl (DULCOLAX) suppository 10 mg, 10 mg, Rectal, Daily PRN, Kim Barnard MD    bisoprolol (ZEBeta) tablet 5 mg, 5 mg, Oral, Daily, StingKim sutherland MD, 5 mg at 05/12/25 0832    dextrose (D50W) (25 g/50 mL) IV injection 25 g, 25 g, Intravenous, Q15 Min PRN, Kelechi Sanchez MD    dextrose (GLUTOSE) oral gel 15 g, 15 g, Oral, Q15 Min PRN, Kelechi Sanchez MD    ferrous sulfate tablet 325 mg, 325 mg, Oral, Every Other Day, Kim Barnard MD, 325 mg at 05/11/25 0843    [Held by provider] furosemide (LASIX) injection 80 mg, 80 mg, Intravenous, BID Diuretics, Kim Barnard MD, 80 mg at 05/08/25 1713    glucagon (GLUCAGEN) injection 1 mg, 1 mg, Intramuscular, Q15 Min PRN, Kelechi Sanchez MD    hydrALAZINE (APRESOLINE) tablet 100 mg, 100 mg, Oral, Q8H, Raffaele Stevens MD, 100 mg at 05/12/25 0519    HYDROcodone-acetaminophen (NORCO) 7.5-325 MG per tablet 1 tablet, 1 tablet, Oral, Q6H PRN, Kim Barnard MD, 1 tablet at 05/11/25 1508    HYDROmorphone (DILAUDID) injection 0.5 mg, 0.5 mg, Intravenous, Q4H PRN, Kim Barnard MD, 0.5 mg at 05/08/25 2242    insulin glargine (LANTUS, SEMGLEE) injection 70 Units, 70  Units, Subcutaneous, Daily, Kelechi Sanchez MD    insulin lispro (HUMALOG/ADMELOG) injection 2-9 Units, 2-9 Units, Subcutaneous, 4x Daily AC & at Bedtime, Kelechi Sanchez MD, 2 Units at 05/11/25 2138    levothyroxine (SYNTHROID, LEVOTHROID) tablet 137 mcg, 137 mcg, Oral, Q AM, Kim Barnard MD, 137 mcg at 05/12/25 0520    melatonin tablet 2.5 mg, 2.5 mg, Oral, Nightly PRN, Kim Barnard MD, 2.5 mg at 05/10/25 2039    ondansetron ODT (ZOFRAN-ODT) disintegrating tablet 4 mg, 4 mg, Oral, Q6H PRN **OR** ondansetron (ZOFRAN) injection 4 mg, 4 mg, Intravenous, Q6H PRN, Kim Barnard MD    pantoprazole (PROTONIX) EC tablet 40 mg, 40 mg, Oral, Q AM, Kim Barnard MD, 40 mg at 05/12/25 0520    pregabalin (LYRICA) capsule 25 mg, 25 mg, Oral, BID, Kim Barnard MD, 25 mg at 05/12/25 0832    sodium bicarbonate tablet 650 mg, 650 mg, Oral, TID, Brendon Aguilar MD, 650 mg at 05/12/25 0832    sodium chloride 0.9 % flush 10 mL, 10 mL, Intravenous, Q12H, Kelechi Sanchez MD, 10 mL at 05/12/25 0832    sodium chloride 0.9 % flush 10 mL, 10 mL, Intravenous, PRN, Kelechi Sanchez MD    sodium chloride 0.9 % infusion 40 mL, 40 mL, Intravenous, PRN, Kelechi Sanchez MD     Allergies:   Allergies   Allergen Reactions    Sulfa Antibiotics Unknown - High Severity     Pt says it was a long time ago and I don't remember but it wasn't good.           Data (labs/images reviewed):   WBC   Date Value Ref Range Status   05/12/2025 14.12 (H) 3.40 - 10.80 10*3/mm3 Final     RBC   Date Value Ref Range Status   05/12/2025 3.09 (L) 3.77 - 5.28 10*6/mm3 Final     Hemoglobin   Date Value Ref Range Status   05/12/2025 8.4 (L) 12.0 - 15.9 g/dL Final     Hematocrit   Date Value Ref Range Status   05/12/2025 26.2 (L) 34.0 - 46.6 % Final     MCV   Date Value Ref Range Status   05/12/2025 84.8 79.0 - 97.0 fL Final     MCH   Date Value Ref Range Status   05/12/2025 27.2 26.6 - 33.0 pg  Final     MCHC   Date Value Ref Range Status   05/12/2025 32.1 31.5 - 35.7 g/dL Final     RDW   Date Value Ref Range Status   05/12/2025 15.4 12.3 - 15.4 % Final     RDW-SD   Date Value Ref Range Status   05/12/2025 47.4 37.0 - 54.0 fl Final     MPV   Date Value Ref Range Status   05/12/2025 9.6 6.0 - 12.0 fL Final     Platelets   Date Value Ref Range Status   05/12/2025 308 140 - 450 10*3/mm3 Final     Neutrophil %   Date Value Ref Range Status   05/12/2025 83.3 (H) 42.7 - 76.0 % Final     Lymphocyte %   Date Value Ref Range Status   05/12/2025 5.1 (L) 19.6 - 45.3 % Final     Monocyte %   Date Value Ref Range Status   05/12/2025 7.2 5.0 - 12.0 % Final     Eosinophil %   Date Value Ref Range Status   05/12/2025 3.0 0.3 - 6.2 % Final     Basophil %   Date Value Ref Range Status   05/12/2025 0.6 0.0 - 1.5 % Final     Immature Grans %   Date Value Ref Range Status   05/12/2025 0.8 (H) 0.0 - 0.5 % Final     Neutrophils, Absolute   Date Value Ref Range Status   05/12/2025 11.76 (H) 1.70 - 7.00 10*3/mm3 Final     Lymphocytes, Absolute   Date Value Ref Range Status   05/12/2025 0.72 0.70 - 3.10 10*3/mm3 Final     Monocytes, Absolute   Date Value Ref Range Status   05/12/2025 1.01 (H) 0.10 - 0.90 10*3/mm3 Final     Eosinophils, Absolute   Date Value Ref Range Status   05/12/2025 0.43 (H) 0.00 - 0.40 10*3/mm3 Final     Basophils, Absolute   Date Value Ref Range Status   05/12/2025 0.09 0.00 - 0.20 10*3/mm3 Final     Immature Grans, Absolute   Date Value Ref Range Status   05/12/2025 0.11 (H) 0.00 - 0.05 10*3/mm3 Final     nRBC   Date Value Ref Range Status   05/12/2025 0.0 0.0 - 0.2 /100 WBC Final        Lab Results   Component Value Date    GLUCOSE 158 (H) 05/12/2025    BUN 58 (H) 05/12/2025    CREATININE 3.20 (H) 05/12/2025     05/12/2025    K 4.6 05/12/2025     (H) 05/12/2025    CALCIUM 8.6 05/12/2025    PROTEINTOT 8.1 04/17/2025    ALBUMIN 3.1 (L) 05/10/2025    ALT 5 04/17/2025    AST 8 04/17/2025    ALKPHOS  175 (H) 04/17/2025    BILITOT 0.2 04/17/2025    GLOB 4.8 04/17/2025    AGRATIO 0.7 04/17/2025    BCR 18.1 05/12/2025    ANIONGAP 9.6 05/12/2025    EGFR 13.8 (L) 05/12/2025        US Renal Bilateral  US RENAL BILATERAL-     Clinical: Acute renal insufficiency     COMPARISON 4/30/2024     FINDINGS: The right kidney measures 10.1 cm in length and the left  kidney measures 10.8 cm in length. Renal cortical echotexture is greater  than anticipated consistent with medical renal disease. No  hydronephrosis on the right or left. Bilateral renal cysts again  demonstrated with the largest on the right measuring 2.3 cm in maximum  diameter and the largest on the left measuring 3.8 cm in maximum  diameter. No renal calculus. Limited images of the bladder fail to  demonstrate either ureteral jet. Small amount of suspended debris is  suggested within the bladder lumen.     CONCLUSION: Renal cortical echotexture consistent with medical renal  disease, no hydronephrosis. Bilateral renal cysts. Neither ureteral jet  could be documented.     This report was finalized on 5/9/2025 7:28 PM by Dr. Eduardo Lopez M.D  on Workstation: [id number]     Adult Transthoracic Echo Complete W/ Cont if Necessary Per Protocol    Left ventricular ejection fraction appears to be 56 - 60%.    Left ventricular diastolic function was normal.           Palliative Care Assessment and Recommendations: Halie Guidry is a 84 y.o. female with a primary palliative care diagnosis of severe peripheral vascular disease and chronic kidney disease stage IV. Palliative care was consulted for goals of care conversation and hospice conversation.     Prognosis and Palliative Performance Scale:  Performance 50% based on the following measures: Ambulation: Mainly sit or lie down, Activity and Evidence of Disease: Unable to do any work, extensive evidence of disease, Self-Care: Considerable assistance required,  Intake: Normal or reduced, LOC: Full or  confusion  Disease State: Controlled with current treatments  Symptom Management:   Pain: 6/10, controlled with Norco 7.5/325 mg every 6 hours prn  Shortness of Breath: 0/10  Constipation: 0/10  Other Palliative Symptoms: none  Goals of Care Treatment Preferences   Palliative Care Decision Making Capacity: intact  Healthcare Surrogate: Yes  What is Most important to patient/family at this time:   Code Status DNR  Other Considerations regarding life sustaining treatments:   Advance Directives Present or Completed: Michele  Other Recommendations: DNR/DNI, no dialysis, hospice consult  Follow up: tomorrow   Discussed plan with: RN and patient      I spent a total of 75 minutes today caring for patient including reviewing records, speaking with patient/family and collaborating with care team.     I spent from 1100 to 1145 in advance care planning discussing the above goals of care (total time 45 minutes)      Thank you for consulting palliative care. If you need to reach the provider on call for the palliative care team please call [phone]      SERGE Francis  5/12/2025 11:01 EDT

## 2025-05-12 NOTE — CASE MANAGEMENT/SOCIAL WORK
Continued Stay Note  [facility]     Patient Name: Halie Guidry  MRN: [id number]  Today's Date: 5/12/2025    Admit Date: 5/7/2025    Plan: Pending Palliative consult   Discharge Plan       Row Name 05/12/25 1444       Plan    Plan Pending Palliative consult    Plan Comments Discussed in huddle; Palliative consult pending. CCP will follow for Palliative consult and goals of care. CCP to assist as needed. Nichole BAIG                   Discharge Codes    No documentation.                 Expected Discharge Date and Time       Expected Discharge Date Expected Discharge Time    May 13, 2025               SAFIA Kearney

## 2025-05-12 NOTE — PROGRESS NOTES
[facility]   Surgical Progress Note    Patient Name: Halie Guidry  : 1940  MRN: [id number]  Date of admission: 2025  Surgical Procedures Since Admission:    Subjective   Subjective     Chief Complaint: Lower extremity wound with ischemic rest pain.    Leg Swelling     Patient resting comfortably denies chest pain or shortness of breath.  Less pain in the lower extremities.    Review of Systems    Objective   Objective     Vitals:   Temp:  [97.9 °F (36.6 °C)-98.8 °F (37.1 °C)] 98.8 °F (37.1 °C)  Heart Rate:  [66-82] 66  Resp:  [18] 18  BP: (153-170)/(51-76) 153/51  Output by Drain (mL) 25 0701 - 25 1900 25 1901 - 25 0700 25 0701 - 25 0752 Range Total   External Urinary Catheter 300 650  950       Physical Exam  Constitutional:       Appearance: She is well-developed.   Pulmonary:      Effort: Pulmonary effort is normal. No respiratory distress.   Abdominal:      General: There is no distension.      Palpations: Abdomen is soft.   Neurological:      Mental Status: She is alert and oriented to person, place, and time.           Result Review    Result Review:  I have personally reviewed the results from the time of this admission to 2025 07:52 EDT and agree with these findings:  [x]  Laboratory list / accordion  []  Microbiology  []  Radiology  []  EKG/Telemetry   []  Cardiology/Vascular   []  Pathology  []  Old records  []  Other:  Most notable findings include:       Results from last 7 days   Lab Units 25  0542 05/10/25  0314 25  0614 252 25  1706   WBC 10*3/mm3 13.23* 13.38* 12.99* 14.00* 10.69   HEMOGLOBIN g/dL 8.6* 7.9* 8.6* 8.2* 8.5*   PLATELETS 10*3/mm3 308 282 297 314 333     Results from last 7 days   Lab Units 25  0542 05/10/25  0314 25  0614 25  0222 05/07/25  1706   SODIUM mmol/L 143 140 139 136 135*   POTASSIUM mmol/L 4.2 3.7 3.8 4.5 4.5   CHLORIDE mmol/L 110* 108* 106 106 105   CO2 mmol/L 17.2*  16.0* 15.0* 14.5* 14.8*   BUN mg/dL 62* 68* 75* 80* 77*   CREATININE mg/dL 3.24* 3.49* 3.60* 3.33* 3.46*   GLUCOSE mg/dL 62* 126* 123* 185* 253*   MAGNESIUM mg/dL  --   --  1.6  --  1.7   PHOSPHORUS mg/dL  --  5.5* 5.3* 5.8*  --    Estimated Creatinine Clearance: 16.1 mL/min (A) (by C-G formula based on SCr of 3.24 mg/dL (H)).    Lab Results   Component Value Date    HGBA1C 9.10 (H) 11/06/2024    HGBA1C 10.30 (H) 10/12/2024    HGBA1C 7.90 (H) 08/24/2024     Glucose   Date/Time Value Ref Range Status   05/11/2025 2137 164 (H) 70 - 130 mg/dL Final   05/11/2025 1641 191 (H) 70 - 130 mg/dL Final   05/11/2025 1129 161 (H) 70 - 130 mg/dL Final   05/11/2025 0919 79 70 - 130 mg/dL Final   05/11/2025 0828 56 (L) 70 - 130 mg/dL Final   05/11/2025 0747 55 (L) 70 - 130 mg/dL Final   05/10/2025 2038 234 (H) 70 - 130 mg/dL Final   05/10/2025 1653 160 (H) 70 - 130 mg/dL Final       Assessment & Plan   Assessment / Plan     Brief Patient Summary:  Halie Giudry is a 84 y.o. female who combination arterial ischemic and swelling related ulcerations with rest pain bilaterally.        5/7/2025          Active Hospital Problems:   Active Hospital Problems    Diagnosis     **Acute on chronic renal failure     Diabetes     PVD (peripheral vascular disease) with claudication     Cellulitis     Anemia     HTN (hypertension)     CKD (chronic kidney disease) stage 4, GFR 15-29 ml/min     Dependent edema     Acute kidney injury     Hypothyroidism (acquired)      Plan:   5/9/2025: Extremely difficult situation given severe acute and chronic medical comorbidities including advanced chronic kidney disease.  Patient has severe chronic arterial insufficiency likely leading to ischemic rest pain.  I personally discussed the case with nephrology.  My initial recommendations were for a CT angiogram with runoff to define anatomy to see if any options for revascularization exist.  My concerns are without revascularization she will progress and  require bilateral above-knee amputations.  I have called and discussed the case with the patient's son, Derrick, he is unclear about the best path forward.  He states his mother on multiple previous occasions says that she would just want nature to take its course and did not wish to live.  I have recommended that Derrick come to the hospital and discussed the overall goals of care with his mother.  Will hold off on CT angiogram for now.  I will check on the patient again on Monday to see if any goals of care decisions have been made.    5/12/2025: Patient's legs are less red and less swollen and less painful today.  She remains with known severe multilevel peripheral arterial disease based upon her previous Dopplers.  Her son was able to visit over the weekend.  Patient's still is uncertain of how she wants to proceed.  I answered all of her questions to the best of my ability.  Will hold off on CT angiogram until a more definitive goals of care are defined.  Will periodically check her chart.  Please call with directed questions.  VTE Prophylaxis:  Mechanical VTE prophylaxis orders are present.        Ke Kilgore MD

## 2025-05-12 NOTE — PROGRESS NOTES
[facility] Progress Note      Patient Name: Halie Guidry  : 1940  MRN: [id number]  Primary Care Physician:  Rossy Laird APRN  Date of admission: 2025    Subjective     Interval History:     No new issues noted today.  No labs performed this morning  Objective     Vitals:   Temp:  [97.9 °F (36.6 °C)-98.8 °F (37.1 °C)] 98.8 °F (37.1 °C)  Heart Rate:  [66-82] 66  Resp:  [18] 18  BP: (153-170)/(51-76) 153/51    Intake/Output Summary (Last 24 hours) at 2025 0916  Last data filed at 2025 0723  Gross per 24 hour   Intake 720 ml   Output 950 ml   Net -230 ml       Physical Exam:    General Appearance: Ill looking  Skin: warm and dry  HEENT: oral mucosa normal, nonicteric sclera  Neck: supple, no JVD  Lungs: CTA  Heart: RRR, normal S1 and S2  Abdomen: soft, nontender, nondistended  : no palpable bladder  Extremities:  BLE edema with erythema; ulcer to left leg.  Edema improved  Neuro: normal speech and mental status     Scheduled Meds:     allopurinol, 100 mg, Oral, Daily  [Held by provider] amLODIPine, 5 mg, Oral, Q24H  atorvastatin, 80 mg, Oral, Nightly  bisoprolol, 5 mg, Oral, Daily  ferrous sulfate, 325 mg, Oral, Every Other Day  [Held by provider] furosemide, 80 mg, Intravenous, BID Diuretics  hydrALAZINE, 100 mg, Oral, Q8H  insulin glargine, 70 Units, Subcutaneous, Daily  insulin lispro, 2-9 Units, Subcutaneous, 4x Daily AC & at Bedtime  levothyroxine, 137 mcg, Oral, Q AM  pantoprazole, 40 mg, Oral, Q AM  pregabalin, 25 mg, Oral, BID  sodium bicarbonate, 650 mg, Oral, TID  sodium chloride, 10 mL, Intravenous, Q12H      IV Meds:        Results Reviewed:   I have personally reviewed the results from the time of this admission to 2025 09:16 EDT     Results from last 7 days   Lab Units 25  0542 05/10/25  0314 25  0614   SODIUM mmol/L 143 140 139   POTASSIUM mmol/L 4.2 3.7 3.8   CHLORIDE mmol/L 110* 108* 106   CO2 mmol/L 17.2* 16.0* 15.0*    BUN mg/dL 62* 68* 75*   CREATININE mg/dL 3.24* 3.49* 3.60*   CALCIUM mg/dL 8.3* 8.1* 8.1*   GLUCOSE mg/dL 62* 126* 123*       Estimated Creatinine Clearance: 16.1 mL/min (A) (by C-G formula based on SCr of 3.24 mg/dL (H)).    Results from last 7 days   Lab Units 05/10/25  0314 05/09/25  0614 05/08/25 0222 05/07/25  1706   MAGNESIUM mg/dL  --  1.6  --  1.7   PHOSPHORUS mg/dL 5.5* 5.3* 5.8*  --        Results from last 7 days   Lab Units 05/09/25 0614 05/08/25 0222   URIC ACID mg/dL 12.0* 10.4*       Results from last 7 days   Lab Units 05/11/25  0542 05/10/25  0314 05/09/25  0614 05/08/25  0222 05/07/25  1706   WBC 10*3/mm3 13.23* 13.38* 12.99* 14.00* 10.69   HEMOGLOBIN g/dL 8.6* 7.9* 8.6* 8.2* 8.5*   PLATELETS 10*3/mm3 308 282 297 314 333             Assessment / Plan     ASSESSMENT:  Nonoliguric VIPUL, suspect secondary to CHF exacerbation.  Patient has volume excess on both exam and imaging.   High anion gap metabolic acidosis associated with normal anion gap metabolic acidosis, secondary to VIPUL/CKD and bicarb loss  CKD stage IIIb, baseline SCr 2.3-2.6, secondary to longstanding hypertensive/diabetic nephrosclerosis and chronic cardiorenal syndrome  HTN with CKD, BP trending up.  Increase hydralazine.  DM2 with CKD managed by primary team  BLE edema/PAD, evaluated by vascular surgery, planning on  possible CTA with runoff for purposes of limbs salvage  UTI, managed by primary team    PLAN:  Vascular surgery planning on possible CTA once goals of care are defined  Kidney function is improving as of yesterday.  Awaiting today's lab  We will continue current therapy for now  Surveillance      Thank you for involving us in the care of Halie Guidry.  Please feel free to call with any questions.    Brendon Aguilar MD  05/12/25  09:16 EDT    [facility]  [phone]

## 2025-05-12 NOTE — PROGRESS NOTES
Name: Halie Guidry ADMIT: 2025   : 1940  PCP: Rossy Laird APRN    MRN: [id number] LOS: 4 days   AGE/SEX: 84 y.o. female  ROOM: [facility]     Subjective   Subjective   No new issues today. Resting comfortably in bed. States legs are continuing to hurt.        Objective   Objective   Vital Signs  Temp:  [97.9 °F (36.6 °C)-98.8 °F (37.1 °C)] 98.8 °F (37.1 °C)  Heart Rate:  [66-82] 66  Resp:  [18] 18  BP: (153-170)/(51-76) 153/51  SpO2:  [92 %-100 %] 100 %  on   ;   Device (Oxygen Therapy): room air  Body mass index is 34.07 kg/m².  Physical Exam  Constitutional:       General: She is not in acute distress.     Appearance: She is ill-appearing.   HENT:      Head: Normocephalic and atraumatic.   Cardiovascular:      Rate and Rhythm: Normal rate and regular rhythm.   Pulmonary:      Effort: Pulmonary effort is normal. No respiratory distress.   Abdominal:      General: There is no distension.      Palpations: Abdomen is soft.      Tenderness: There is no abdominal tenderness.   Musculoskeletal:      Comments: Tender to palpation on both lower extremities with erythema    Neurological:      Mental Status: She is alert and oriented to person, place, and time.      Motor: Weakness present.         Results Review     I reviewed the patient's new clinical results.  Results from last 7 days   Lab Units 25  0951 05/11/25  0542 05/10/25  0314 05/09/25  0614   WBC 10*3/mm3 14.12* 13.23* 13.38* 12.99*   HEMOGLOBIN g/dL 8.4* 8.6* 7.9* 8.6*   PLATELETS 10*3/mm3 308 308 282 297     Results from last 7 days   Lab Units 25  0951 2542 05/10/25  0314 05/09/25  0614   SODIUM mmol/L 138 143 140 139   POTASSIUM mmol/L 4.6 4.2 3.7 3.8   CHLORIDE mmol/L 109* 110* 108* 106   CO2 mmol/L 19.4* 17.2* 16.0* 15.0*   BUN mg/dL 58* 62* 68* 75*   CREATININE mg/dL 3.20* 3.24* 3.49* 3.60*   GLUCOSE mg/dL 158* 62* 126* 123*   EGFR mL/min/1.73 13.8* 13.6* 12.4* 12.0*     Results from last 7 days   Lab Units  05/10/25  0314 05/09/25  0614 05/08/25  0222   ALBUMIN g/dL 3.1* 3.2* 3.2*     Results from last 7 days   Lab Units 05/12/25  0951 05/11/25  0542 05/10/25  0314 05/09/25  0614 05/08/25 0222 05/07/25  1706   CALCIUM mg/dL 8.6 8.3* 8.1* 8.1* 8.3* 8.2*   ALBUMIN g/dL  --   --  3.1* 3.2* 3.2*  --    MAGNESIUM mg/dL  --   --   --  1.6  --  1.7   PHOSPHORUS mg/dL  --   --  5.5* 5.3* 5.8*  --      Results from last 7 days   Lab Units 05/08/25 0222   LACTATE mmol/L 1.5     Glucose   Date/Time Value Ref Range Status   05/12/2025 1139 238 (H) 70 - 130 mg/dL Final   05/12/2025 0833 73 70 - 130 mg/dL Final   05/11/2025 2137 164 (H) 70 - 130 mg/dL Final   05/11/2025 1641 191 (H) 70 - 130 mg/dL Final   05/11/2025 1129 161 (H) 70 - 130 mg/dL Final   05/11/2025 0919 79 70 - 130 mg/dL Final   05/11/2025 0828 56 (L) 70 - 130 mg/dL Final       No radiology results for the last day    I have personally reviewed all medications:  Scheduled Medications  allopurinol, 100 mg, Oral, Daily  [Held by provider] amLODIPine, 5 mg, Oral, Q24H  atorvastatin, 80 mg, Oral, Nightly  bisoprolol, 5 mg, Oral, Daily  ferrous sulfate, 325 mg, Oral, Every Other Day  [Held by provider] furosemide, 80 mg, Intravenous, BID Diuretics  hydrALAZINE, 100 mg, Oral, Q8H  insulin glargine, 70 Units, Subcutaneous, Daily  insulin lispro, 2-9 Units, Subcutaneous, 4x Daily AC & at Bedtime  levothyroxine, 137 mcg, Oral, Q AM  pantoprazole, 40 mg, Oral, Q AM  pregabalin, 25 mg, Oral, BID  sodium bicarbonate, 650 mg, Oral, TID  sodium chloride, 10 mL, Intravenous, Q12H    Infusions   Diet  Diet: Cardiac, Renal; Healthy Heart (2-3 Na+); Low Sodium (2-3g), Low Phosphorus; Fluid Consistency: Thin (IDDSI 0)    I have personally reviewed:  [x]  Laboratory   [x]  Microbiology   [x]  Radiology   [x]  EKG/Telemetry  [x]  Cardiology/Vascular   []  Pathology    []  Records       Assessment/Plan     Active Hospital Problems    Diagnosis  POA    **Acute on chronic renal failure  [N17.9, N18.9]  Yes    Diabetes [E11.9]  Unknown    PVD (peripheral vascular disease) with claudication [I73.9]  Unknown    Cellulitis [L03.90]  Unknown    Anemia [D64.9]  Unknown    HTN (hypertension) [I10]  Yes    CKD (chronic kidney disease) stage 4, GFR 15-29 ml/min [N18.4]  Yes    Dependent edema [R60.9]  Yes    Acute kidney injury [N17.9]  Yes    Hypothyroidism (acquired) [E03.9]  Yes      Resolved Hospital Problems   No resolved problems to display.       84 y.o. female admitted with Acute on chronic renal failure.    1. Acute on CKD stage IV, baseline creatinine is around 2.6 and was noted to be 3.46 upon admission.  Home torsemide has been discontinued and nephrology initiated patient on Lasix 80 mg IV every 12 hours with which creatinine initially slightly improved to 3.33 and again started worsening therefore diuretics were discontinued.  Scr is now improving and nephrology is following.     2.  Diabetes mellitus, blood sugars are dropping into 50s to 60s therefore we will cut back Lantus from 100 units to 70 units.  Will further cut down to 40u for now     3.  Hypertension, on bisoprolol, hydralazine.  Home dose Norvasc has been held secondary to lower extremity edema.    4.  Peripheral vascular disease with suspected ischemic rest pain, vascular consult has been obtained and arterial Doppler suggestive of severe disease.  Vascular surgery recommends CT angiogram with runoffs with possible intervention.  Vascular discussed above with son(Derrick) who is unsure how to proceed further given his mother stated no significant medical intervention in the past.  He would like to visit mother over the weekend to make further decisions.  Palliative care consult has been obtained as well for goals of care discussion.    5.  Bilateral lower respiratory cellulitis, infectious disease evaluated and felt cellulitis is less likely and IV Rocephin has been discontinued.    6.  Bilateral lower extremity edema, 2D echo  during his hospital stay revealed normal ejection fraction.    7.  Hypothyroidism, on Synthroid.    8.  Anemia, iron panel is suggestive of iron deficiency. S/p 3 doses of IV iron    9.  Hyperlipidemia, on statins.    10.  CODE STATUS is full code.    11.  Expected discharge date, 5/15/2025.      Milind Cowan MD  [facility]  05/12/25  13:11 EDT

## 2025-05-12 NOTE — PLAN OF CARE
Goal Outcome Evaluation:  Plan of Care Reviewed With: patient        Progress: no change  Outcome Evaluation: Vitals stable. Dressing changed on R calf this shift. Palliative met w pt today. [facility] consulted, code status discussed. Pt needs met and questions answered at this time. Will continue to monitor

## 2025-05-13 LAB
ALBUMIN SERPL-MCNC: 3 G/DL (ref 3.5–5.2)
ANION GAP SERPL CALCULATED.3IONS-SCNC: 14.5 MMOL/L (ref 5–15)
BACTERIA SPEC AEROBE CULT: NORMAL
BACTERIA SPEC AEROBE CULT: NORMAL
BASOPHILS # BLD AUTO: 0.1 10*3/MM3 (ref 0–0.2)
BASOPHILS NFR BLD AUTO: 0.8 % (ref 0–1.5)
BUN SERPL-MCNC: 56 MG/DL (ref 8–23)
BUN/CREAT SERPL: 17.2 (ref 7–25)
CALCIUM SPEC-SCNC: 8.5 MG/DL (ref 8.6–10.5)
CHLORIDE SERPL-SCNC: 106 MMOL/L (ref 98–107)
CO2 SERPL-SCNC: 18.5 MMOL/L (ref 22–29)
CREAT SERPL-MCNC: 3.25 MG/DL (ref 0.57–1)
DEPRECATED RDW RBC AUTO: 46.5 FL (ref 37–54)
EGFRCR SERPLBLD CKD-EPI 2021: 13.5 ML/MIN/1.73
EOSINOPHIL # BLD AUTO: 0.63 10*3/MM3 (ref 0–0.4)
EOSINOPHIL NFR BLD AUTO: 4.8 % (ref 0.3–6.2)
ERYTHROCYTE [DISTWIDTH] IN BLOOD BY AUTOMATED COUNT: 15.2 % (ref 12.3–15.4)
GLUCOSE BLDC GLUCOMTR-MCNC: 139 MG/DL (ref 70–130)
GLUCOSE BLDC GLUCOMTR-MCNC: 144 MG/DL (ref 70–130)
GLUCOSE BLDC GLUCOMTR-MCNC: 177 MG/DL (ref 70–130)
GLUCOSE BLDC GLUCOMTR-MCNC: 197 MG/DL (ref 70–130)
GLUCOSE SERPL-MCNC: 134 MG/DL (ref 65–99)
HCT VFR BLD AUTO: 25.4 % (ref 34–46.6)
HGB BLD-MCNC: 8 G/DL (ref 12–15.9)
IMM GRANULOCYTES # BLD AUTO: 0.14 10*3/MM3 (ref 0–0.05)
IMM GRANULOCYTES NFR BLD AUTO: 1.1 % (ref 0–0.5)
LYMPHOCYTES # BLD AUTO: 0.97 10*3/MM3 (ref 0.7–3.1)
LYMPHOCYTES NFR BLD AUTO: 7.4 % (ref 19.6–45.3)
MCH RBC QN AUTO: 26.6 PG (ref 26.6–33)
MCHC RBC AUTO-ENTMCNC: 31.5 G/DL (ref 31.5–35.7)
MCV RBC AUTO: 84.4 FL (ref 79–97)
MONOCYTES # BLD AUTO: 1.15 10*3/MM3 (ref 0.1–0.9)
MONOCYTES NFR BLD AUTO: 8.8 % (ref 5–12)
NEUTROPHILS NFR BLD AUTO: 10.14 10*3/MM3 (ref 1.7–7)
NEUTROPHILS NFR BLD AUTO: 77.1 % (ref 42.7–76)
NRBC BLD AUTO-RTO: 0 /100 WBC (ref 0–0.2)
PHOSPHATE SERPL-MCNC: 4.7 MG/DL (ref 2.5–4.5)
PLATELET # BLD AUTO: 303 10*3/MM3 (ref 140–450)
PMV BLD AUTO: 9.6 FL (ref 6–12)
POTASSIUM SERPL-SCNC: 4.3 MMOL/L (ref 3.5–5.2)
RBC # BLD AUTO: 3.01 10*6/MM3 (ref 3.77–5.28)
SODIUM SERPL-SCNC: 139 MMOL/L (ref 136–145)
TSH SERPL DL<=0.05 MIU/L-ACNC: 14.6 UIU/ML (ref 0.27–4.2)
WBC NRBC COR # BLD AUTO: 13.13 10*3/MM3 (ref 3.4–10.8)

## 2025-05-13 PROCEDURE — 82948 REAGENT STRIP/BLOOD GLUCOSE: CPT

## 2025-05-13 PROCEDURE — 84443 ASSAY THYROID STIM HORMONE: CPT | Performed by: INTERNAL MEDICINE

## 2025-05-13 PROCEDURE — 25010000002 HYDROMORPHONE PER 4 MG: Performed by: INTERNAL MEDICINE

## 2025-05-13 PROCEDURE — 63710000001 INSULIN LISPRO (HUMAN) PER 5 UNITS: Performed by: INTERNAL MEDICINE

## 2025-05-13 PROCEDURE — 63710000001 INSULIN GLARGINE PER 5 UNITS: Performed by: STUDENT IN AN ORGANIZED HEALTH CARE EDUCATION/TRAINING PROGRAM

## 2025-05-13 PROCEDURE — 80069 RENAL FUNCTION PANEL: CPT | Performed by: HOSPITALIST

## 2025-05-13 PROCEDURE — 85025 COMPLETE CBC W/AUTO DIFF WBC: CPT | Performed by: INTERNAL MEDICINE

## 2025-05-13 RX ORDER — BISOPROLOL FUMARATE 5 MG/1
5 TABLET, FILM COATED ORAL ONCE
Status: COMPLETED | OUTPATIENT
Start: 2025-05-13 | End: 2025-05-13

## 2025-05-13 RX ORDER — HYDROXYZINE HYDROCHLORIDE 25 MG/1
25 TABLET, FILM COATED ORAL 3 TIMES DAILY PRN
Status: DISCONTINUED | OUTPATIENT
Start: 2025-05-13 | End: 2025-05-16 | Stop reason: HOSPADM

## 2025-05-13 RX ORDER — HYDROCODONE BITARTRATE AND ACETAMINOPHEN 7.5; 325 MG/1; MG/1
1 TABLET ORAL EVERY 6 HOURS PRN
Refills: 0 | Status: DISCONTINUED | OUTPATIENT
Start: 2025-05-13 | End: 2025-05-16 | Stop reason: HOSPADM

## 2025-05-13 RX ORDER — BISOPROLOL FUMARATE 5 MG/1
10 TABLET, FILM COATED ORAL DAILY
Status: DISCONTINUED | OUTPATIENT
Start: 2025-05-14 | End: 2025-05-16 | Stop reason: HOSPADM

## 2025-05-13 RX ADMIN — HYDROMORPHONE HYDROCHLORIDE 0.5 MG: 1 INJECTION, SOLUTION INTRAMUSCULAR; INTRAVENOUS; SUBCUTANEOUS at 00:49

## 2025-05-13 RX ADMIN — INSULIN LISPRO 2 UNITS: 100 INJECTION, SOLUTION INTRAVENOUS; SUBCUTANEOUS at 17:54

## 2025-05-13 RX ADMIN — HYDROXYZINE HYDROCHLORIDE 25 MG: 25 TABLET, FILM COATED ORAL at 13:11

## 2025-05-13 RX ADMIN — PREGABALIN 25 MG: 25 CAPSULE ORAL at 08:50

## 2025-05-13 RX ADMIN — INSULIN LISPRO 2 UNITS: 100 INJECTION, SOLUTION INTRAVENOUS; SUBCUTANEOUS at 11:49

## 2025-05-13 RX ADMIN — HYDRALAZINE HYDROCHLORIDE 100 MG: 50 TABLET ORAL at 06:22

## 2025-05-13 RX ADMIN — BISOPROLOL FUMARATE 5 MG: 5 TABLET ORAL at 14:36

## 2025-05-13 RX ADMIN — LEVOTHYROXINE SODIUM 137 MCG: 137 TABLET ORAL at 06:22

## 2025-05-13 RX ADMIN — FERROUS SULFATE TAB 325 MG (65 MG ELEMENTAL FE) 325 MG: 325 (65 FE) TAB at 08:50

## 2025-05-13 RX ADMIN — SODIUM BICARBONATE 650 MG: 650 TABLET ORAL at 17:54

## 2025-05-13 RX ADMIN — HYDROCODONE BITARTRATE AND ACETAMINOPHEN 1 TABLET: 7.5; 325 TABLET ORAL at 17:54

## 2025-05-13 RX ADMIN — BISOPROLOL FUMARATE 5 MG: 5 TABLET ORAL at 08:50

## 2025-05-13 RX ADMIN — HYDROCODONE BITARTRATE AND ACETAMINOPHEN 1 TABLET: 7.5; 325 TABLET ORAL at 03:07

## 2025-05-13 RX ADMIN — ATORVASTATIN CALCIUM 80 MG: 80 TABLET, FILM COATED ORAL at 21:01

## 2025-05-13 RX ADMIN — PREGABALIN 25 MG: 25 CAPSULE ORAL at 21:01

## 2025-05-13 RX ADMIN — INSULIN GLARGINE 40 UNITS: 100 INJECTION, SOLUTION SUBCUTANEOUS at 08:50

## 2025-05-13 RX ADMIN — HYDRALAZINE HYDROCHLORIDE 100 MG: 50 TABLET ORAL at 21:01

## 2025-05-13 RX ADMIN — SODIUM BICARBONATE 650 MG: 650 TABLET ORAL at 21:01

## 2025-05-13 RX ADMIN — Medication 10 ML: at 21:01

## 2025-05-13 RX ADMIN — HYDROCODONE BITARTRATE AND ACETAMINOPHEN 1 TABLET: 7.5; 325 TABLET ORAL at 08:57

## 2025-05-13 RX ADMIN — HYDRALAZINE HYDROCHLORIDE 100 MG: 50 TABLET ORAL at 14:36

## 2025-05-13 RX ADMIN — SODIUM BICARBONATE 650 MG: 650 TABLET ORAL at 08:50

## 2025-05-13 RX ADMIN — ALLOPURINOL 100 MG: 100 TABLET ORAL at 08:50

## 2025-05-13 RX ADMIN — Medication 10 ML: at 08:50

## 2025-05-13 RX ADMIN — PANTOPRAZOLE SODIUM 40 MG: 40 TABLET, DELAYED RELEASE ORAL at 06:22

## 2025-05-13 NOTE — CASE MANAGEMENT/SOCIAL WORK
Continued Stay Note  [facility]     Patient Name: Halie Guidry  MRN: [id number]  Today's Date: 5/13/2025    Admit Date: 5/7/2025    Plan: [facility] and per Sonya/Melisa, can return under Hospice. Hospice to setup meeting with pt and son.   Discharge Plan       Row Name 05/13/25 1700       Plan    Plan [facility] and per Sonya/Melisa, can return under Hospice. Hospice to setup meeting with pt and son.    Patient/Family in Agreement with Plan yes    Plan Comments Called and spoke with Sonya/Nurse Manager at [facility]. Per Sonya, they can accept pt back to [facility] under Hospice. Hospice to setup meeting with pt and son. Tobias Hicks RN-BC                   Discharge Codes    No documentation.                 Expected Discharge Date and Time       Expected Discharge Date Expected Discharge Time    May 16, 2025               Tobias Hicks RN

## 2025-05-13 NOTE — PLAN OF CARE
Problem: Adult Inpatient Plan of Care  Goal: Plan of Care Review  Outcome: Progressing  Flowsheets (Taken 5/13/2025 1119)  Progress: no change  Outcome Evaluation: Vitals stable, dressing on right calf c/d/i, q2hr turn, IV dilaudid given once for pain, [facility] paged for expiring PO Norco, PO norco reordered, given once for pain, tolerating well, plan of care on going.  Plan of Care Reviewed With: patient

## 2025-05-13 NOTE — PLAN OF CARE
Goal Outcome Evaluation:  Plan of Care Reviewed With: patient        Progress: no change  Outcome Evaluation: Vitals stable. Prn norco and atarax given. Palliative and [facility] continue to follow. Pt needs met and questions answered at this time. Will continue to monitor

## 2025-05-13 NOTE — PROGRESS NOTES
.        [facility]   Inpatient Palliative Care Follow up  Date: 2025   Reason for follow up: goals of care conversation/hospice consultation       Interval History    Information obtained from: patient    Follow up Information: Patient resting in bed. No new complaints. Reports pain controlled with current regimen. No family present and or visited yesterday per patient. I reviewed labs, medical notes, nursing notes, MAR and I/O. I spoke with PAUL Cherry.      [place] Symptom Assessment Scale (ESAS): ESAS completed by Patient  Pain: 5  Tiredness: 6  Drowsiness: 0 No drowsiness  Nausea: 0 No nausea  Appetite: 0 No lack of appetite  Shortness of breath: 0 No shortness of breath  Depression: 0 No depression  Anxiety: 0 No anxiety  Best Wellbein  Other(Constipation): 0 No constipation    Discussion of Patient/Family Treatment Preferences and Goals of Care: There was a voluntary discussion of advance planning and goals of care discussion. The following were present for the visit: patient    Summary of Discussion: I had follow up conversation. I had received communication that patient son had declined to arrange meeting hospice when they reached yesterday. I verified with patient that she was still interested in their receiving information regarding their support and potentially utilizing them at discharge and she confirms. I did inquire if her son, Derrick had visited since yesterday or called her regarding hospice and she reports no. I called patient son, Derrick while at bedside.  I inquired about his conversation with hospice and he states he felt rushed to make a meeting. I reiterated that they were calling him to arrange a time that works for him to support his mother with goals of symptom management for chronic illnesses, no aggressive testing/procedures, and hospitalizations. In addition, discuss hospice role at [facility] as well. He was amenable and he was informed hospice  "would be reaching out today to arrange meeting. Patient acknowledged as well. I spoke with Jo Ann RN, Tobias NEWBY and Hospice liaison/coordinator.    Physical Assessment   /65 (BP Location: Right arm, Patient Position: Lying)   Pulse 76   Temp 98.1 °F (36.7 °C) (Oral)   Resp 16   Ht 172.7 cm (67.99\")   Wt 135 kg (297 lb 9.9 oz)   SpO2 99%   BMI 45.27 kg/m²    Palliative Performance Scale: Performance 50% based on the following measures: Ambulation: Mainly sit or lie down, Activity and Evidence of Disease: Unable to do any work, extensive evidence of disease, Self-Care: Considerable assistance required,  Intake: Normal or reduced, LOC: Full or confusion  Physical Exam  Constitutional:       General: She is sleeping. She is not in acute distress.     Comments: Chronically ill   Abdominal:      General: Bowel sounds are normal.      Palpations: Abdomen is soft.      Tenderness: There is no abdominal tenderness.      Comments: obese   Genitourinary:     Comments: Purewick with yellow urine noted   Musculoskeletal:      Right lower le+ Pitting Edema present.      Left lower le+ Pitting Edema present.   Neurological:      Mental Status: She is oriented to person, place, and time and easily aroused.   Psychiatric:         Attention and Perception: Attention and perception normal.         Mood and Affect: Mood and affect normal.         Speech: Speech normal.         Behavior: Behavior normal.         Thought Content: Thought content normal.         Cognition and Memory: Cognition normal.         Judgment: Judgment normal.                Data (labs/images reviewed)    WBC   Date Value Ref Range Status   2025 13.13 (H) 3.40 - 10.80 10*3/mm3 Final     RBC   Date Value Ref Range Status   2025 3.01 (L) 3.77 - 5.28 10*6/mm3 Final     Hemoglobin   Date Value Ref Range Status   2025 8.0 (L) 12.0 - 15.9 g/dL Final     Hematocrit   Date Value Ref Range Status   2025 25.4 (L) 34.0 - " 46.6 % Final     MCV   Date Value Ref Range Status   05/13/2025 84.4 79.0 - 97.0 fL Final     MCH   Date Value Ref Range Status   05/13/2025 26.6 26.6 - 33.0 pg Final     MCHC   Date Value Ref Range Status   05/13/2025 31.5 31.5 - 35.7 g/dL Final     RDW   Date Value Ref Range Status   05/13/2025 15.2 12.3 - 15.4 % Final     RDW-SD   Date Value Ref Range Status   05/13/2025 46.5 37.0 - 54.0 fl Final     MPV   Date Value Ref Range Status   05/13/2025 9.6 6.0 - 12.0 fL Final     Platelets   Date Value Ref Range Status   05/13/2025 303 140 - 450 10*3/mm3 Final     Neutrophil %   Date Value Ref Range Status   05/13/2025 77.1 (H) 42.7 - 76.0 % Final     Lymphocyte %   Date Value Ref Range Status   05/13/2025 7.4 (L) 19.6 - 45.3 % Final     Monocyte %   Date Value Ref Range Status   05/13/2025 8.8 5.0 - 12.0 % Final     Eosinophil %   Date Value Ref Range Status   05/13/2025 4.8 0.3 - 6.2 % Final     Basophil %   Date Value Ref Range Status   05/13/2025 0.8 0.0 - 1.5 % Final     Immature Grans %   Date Value Ref Range Status   05/13/2025 1.1 (H) 0.0 - 0.5 % Final     Neutrophils, Absolute   Date Value Ref Range Status   05/13/2025 10.14 (H) 1.70 - 7.00 10*3/mm3 Final     Lymphocytes, Absolute   Date Value Ref Range Status   05/13/2025 0.97 0.70 - 3.10 10*3/mm3 Final     Monocytes, Absolute   Date Value Ref Range Status   05/13/2025 1.15 (H) 0.10 - 0.90 10*3/mm3 Final     Eosinophils, Absolute   Date Value Ref Range Status   05/13/2025 0.63 (H) 0.00 - 0.40 10*3/mm3 Final     Basophils, Absolute   Date Value Ref Range Status   05/13/2025 0.10 0.00 - 0.20 10*3/mm3 Final     Immature Grans, Absolute   Date Value Ref Range Status   05/13/2025 0.14 (H) 0.00 - 0.05 10*3/mm3 Final     nRBC   Date Value Ref Range Status   05/13/2025 0.0 0.0 - 0.2 /100 WBC Final       Lab Results   Component Value Date    GLUCOSE 134 (H) 05/13/2025    BUN 56 (H) 05/13/2025    CREATININE 3.25 (H) 05/13/2025     05/13/2025    K 4.3 05/13/2025      05/13/2025    CALCIUM 8.5 (L) 05/13/2025    PROTEINTOT 8.1 04/17/2025    ALBUMIN 3.0 (L) 05/13/2025    ALT 5 04/17/2025    AST 8 04/17/2025    ALKPHOS 175 (H) 04/17/2025    BILITOT 0.2 04/17/2025    GLOB 4.8 04/17/2025    AGRATIO 0.7 04/17/2025    BCR 17.2 05/13/2025    ANIONGAP 14.5 05/13/2025    EGFR 13.5 (L) 05/13/2025       US Renal Bilateral  US RENAL BILATERAL-     Clinical: Acute renal insufficiency     COMPARISON 4/30/2024     FINDINGS: The right kidney measures 10.1 cm in length and the left  kidney measures 10.8 cm in length. Renal cortical echotexture is greater  than anticipated consistent with medical renal disease. No  hydronephrosis on the right or left. Bilateral renal cysts again  demonstrated with the largest on the right measuring 2.3 cm in maximum  diameter and the largest on the left measuring 3.8 cm in maximum  diameter. No renal calculus. Limited images of the bladder fail to  demonstrate either ureteral jet. Small amount of suspended debris is  suggested within the bladder lumen.     CONCLUSION: Renal cortical echotexture consistent with medical renal  disease, no hydronephrosis. Bilateral renal cysts. Neither ureteral jet  could be documented.     This report was finalized on 5/9/2025 7:28 PM by Dr. Eduardo Lopez M.D  on Workstation: [id number]     Adult Transthoracic Echo Complete W/ Cont if Necessary Per Protocol    Left ventricular ejection fraction appears to be 56 - 60%.    Left ventricular diastolic function was normal.         Palliative Care Assessment and Recommendations  Summary/Assessment:no new complaints  Prognosis and Palliative Performance Scale:  Performance 50% based on the following measures: Ambulation: Mainly sit or lie down, Activity and Evidence of Disease: Unable to do any work, extensive evidence of disease, Self-Care: Considerable assistance required,  Intake: Normal or reduced, LOC: Full or confusion  Disease State: Controlled with current  treatments  Symptom Management:   Pain: Norco 7.5/325 mg every 6 hours prn   Shortness of Breath: n/a  Constipation: n.a  Other Palliative Symptoms: none  Goals of Care Treatment Preferences   Palliative Care Decision Making Capacity: intact  Healthcare Surrogate: Yes  What is Most important to patient/family at this time:   Code Status DNR  Other Considerations regarding life sustaining treatments:   Advance Directives Present or Completed: Michele  Other Recommendations: hospice consult pending, discharge with services if patient/family amenable  Follow up: prn  Discussed plan with: RN, patient, family, and [facility]      I spent a total of 25 minutes today caring for patient including reviewing records, speaking with patient/family and collaborating with care team.         Thank you for consulting palliative care. If you need to reach the provider on call for the palliative care team please call [phone]    SERGE Francis  5/13/2025 10:05 EDT

## 2025-05-13 NOTE — CONSULTS
[facility]    We have attempted twice today to reach pt's son to schedule hospitals meeting to review services. Unable to reach him and unable to leave voicemail as VM box is full. Will continue to try to reach him tomorrow.     Thanks you for the consult.     Please reach out with any additional questions. [phone]

## 2025-05-13 NOTE — SIGNIFICANT NOTE
"   05/13/25 1536   OTHER   Discipline physical therapist   Rehab Time/Intention   Session Not Performed patient/family declined treatment  (Pt sleeping when PT entered room. Pt easily awakens but states \"not today\" and \"too tired\" when PT encouraged patient to participate in activity. PT will check back tomorrow.)   Recommendation   PT - Next Appointment 05/14/25       "

## 2025-05-13 NOTE — PROGRESS NOTES
[facility] Progress Note      Patient Name: Halie Guidry  : 1940  MRN: [id number]  Primary Care Physician:  Rossy Laird APRN  Date of admission: 2025    Subjective     Interval History:   Follow-up VIPUL on CKD 4.  Still having significant leg pain.  Eating.  Bowels moving.  Urine output not all recorded.  Blood pressure 140s to 160s.  On room air.  Itching.    Review of Systems:   As noted above    Objective     Vitals:   Temp:  [98.1 °F (36.7 °C)-98.8 °F (37.1 °C)] 98.1 °F (36.7 °C)  Heart Rate:  [72-77] 76  Resp:  [16] 16  BP: (145-166)/(50-65) 166/65    Intake/Output Summary (Last 24 hours) at 2025 1202  Last data filed at 2025 0727  Gross per 24 hour   Intake 240 ml   Output 600 ml   Net -360 ml       Physical Exam:    General Appearance: Eyes closed but awakens easily.  Mild distress due to discomfort in her legs.  Skin: warm and dry  HEENT: oral mucosa dry, nonicteric sclera  Neck: supple, no JVD  Lungs: Clear to auscultation bilaterally.  Unlabored on room air  Heart: RRR, normal S1 and S2  Abdomen: soft, nontender, active bowel sounds, nondistended  : no palpable bladder  Extremities: She did not allow me to examine her legs.  Neuro: normal speech and mental status     Scheduled Meds:     allopurinol, 100 mg, Oral, Daily  [Held by provider] amLODIPine, 5 mg, Oral, Q24H  atorvastatin, 80 mg, Oral, Nightly  bisoprolol, 5 mg, Oral, Daily  ferrous sulfate, 325 mg, Oral, Every Other Day  [Held by provider] furosemide, 80 mg, Intravenous, BID Diuretics  hydrALAZINE, 100 mg, Oral, Q8H  insulin glargine, 40 Units, Subcutaneous, Daily  insulin lispro, 2-9 Units, Subcutaneous, 4x Daily AC & at Bedtime  levothyroxine, 137 mcg, Oral, Q AM  pantoprazole, 40 mg, Oral, Q AM  pregabalin, 25 mg, Oral, BID  sodium bicarbonate, 650 mg, Oral, TID  sodium chloride, 10 mL, Intravenous, Q12H      IV Meds:        Results Reviewed:   I have personally reviewed the  results from the time of this admission to 5/13/2025 12:02 EDT     Results from last 7 days   Lab Units 05/13/25 0438 05/12/25  0951 05/11/25  0542   SODIUM mmol/L 139 138 143   POTASSIUM mmol/L 4.3 4.6 4.2   CHLORIDE mmol/L 106 109* 110*   CO2 mmol/L 18.5* 19.4* 17.2*   BUN mg/dL 56* 58* 62*   CREATININE mg/dL 3.25* 3.20* 3.24*   CALCIUM mg/dL 8.5* 8.6 8.3*   GLUCOSE mg/dL 134* 158* 62*       Estimated Creatinine Clearance: 18.8 mL/min (A) (by C-G formula based on SCr of 3.25 mg/dL (H)).    Results from last 7 days   Lab Units 05/13/25  0438 05/10/25  0314 05/09/25  0614 05/08/25 0222 05/07/25  1706   MAGNESIUM mg/dL  --   --  1.6  --  1.7   PHOSPHORUS mg/dL 4.7* 5.5* 5.3*   < >  --     < > = values in this interval not displayed.       Results from last 7 days   Lab Units 05/09/25 0614 05/08/25 0222   URIC ACID mg/dL 12.0* 10.4*       Results from last 7 days   Lab Units 05/13/25 0438 05/12/25 0951 05/11/25  0542 05/10/25  0314 05/09/25  0614   WBC 10*3/mm3 13.13* 14.12* 13.23* 13.38* 12.99*   HEMOGLOBIN g/dL 8.0* 8.4* 8.6* 7.9* 8.6*   PLATELETS 10*3/mm3 303 308 308 282 297             Assessment / Plan     ASSESSMENT:  Acute kidney injury on CKD 4 baseline creatinine 2.3-2.6.  Peak creatinine 3.65 925.  Now stable at 3.2.  Potassium normal.  Anion gap metabolic acidosis due to renal failure.  Hypertension with CKD not optimally controlled.  Increase bisoprolol.  Diabetes mellitus type 2 with CKD  Lower extremity wound with ischemic rest pain.  Significant peripheral arterial disease.  Goals of care noted and palliative care consult.  Likely no further procedures.  5.  Anemia of chronic kidney disease.  6.  Hypothyroid on replacement.  PLAN:  Check TSH on the blood in the lab.  Increase bisoprolol to 10 mg daily.  Extra 5 mg dose now.  3.  Hospice has been consulted.  Thank you for involving us in the care of Halie Guidry.  Please feel free to call with any questions.    Mary Rice,  MD  05/13/25  12:02 EDT    [facility]  [phone]    Please note that portions of this note were completed with a voice recognition program.

## 2025-05-13 NOTE — PROGRESS NOTES
Name: Halie Guidry ADMIT: 2025   : 1940  PCP: Rossy Laird APRN    MRN: [id number] LOS: 5 days   AGE/SEX: 84 y.o. female  ROOM: [facility]     Subjective   Subjective   Examined at bedside. No new complaints today        Objective   Objective   Vital Signs  Temp:  [98.1 °F (36.7 °C)-98.8 °F (37.1 °C)] 98.1 °F (36.7 °C)  Heart Rate:  [72-77] 76  Resp:  [16] 16  BP: (145-166)/(50-65) 166/65  SpO2:  [96 %-99 %] 99 %  on   ;   Device (Oxygen Therapy): room air  Body mass index is 45.27 kg/m².  Physical Exam  Constitutional:       General: She is not in acute distress.     Appearance: She is ill-appearing.   HENT:      Head: Normocephalic and atraumatic.   Cardiovascular:      Rate and Rhythm: Normal rate and regular rhythm.   Pulmonary:      Effort: Pulmonary effort is normal. No respiratory distress.   Abdominal:      General: There is no distension.      Palpations: Abdomen is soft.      Tenderness: There is no abdominal tenderness.   Musculoskeletal:      Comments: Tender to palpation on both lower extremities with erythema    Neurological:      Mental Status: She is alert and oriented to person, place, and time.      Motor: Weakness present.         Results Review     I reviewed the patient's new clinical results.  Results from last 7 days   Lab Units 05/13/25  0438 05/12/25  0951 05/11/25  0542 05/10/25  0314   WBC 10*3/mm3 13.13* 14.12* 13.23* 13.38*   HEMOGLOBIN g/dL 8.0* 8.4* 8.6* 7.9*   PLATELETS 10*3/mm3 303 308 308 282     Results from last 7 days   Lab Units 258 2551 25  0542 05/10/25  0314   SODIUM mmol/L 139 138 143 140   POTASSIUM mmol/L 4.3 4.6 4.2 3.7   CHLORIDE mmol/L 106 109* 110* 108*   CO2 mmol/L 18.5* 19.4* 17.2* 16.0*   BUN mg/dL 56* 58* 62* 68*   CREATININE mg/dL 3.25* 3.20* 3.24* 3.49*   GLUCOSE mg/dL 134* 158* 62* 126*   EGFR mL/min/1.73 13.5* 13.8* 13.6* 12.4*     Results from last 7 days   Lab Units 25  0438 05/10/25  0312  05/09/25  0614 05/08/25  0222   ALBUMIN g/dL 3.0* 3.1* 3.2* 3.2*     Results from last 7 days   Lab Units 05/13/25  0438 05/12/25  0951 05/11/25  0542 05/10/25  0314 05/09/25  0614 05/08/25  0222 05/07/25  1706   CALCIUM mg/dL 8.5* 8.6 8.3* 8.1* 8.1* 8.3* 8.2*   ALBUMIN g/dL 3.0*  --   --  3.1* 3.2* 3.2*  --    MAGNESIUM mg/dL  --   --   --   --  1.6  --  1.7   PHOSPHORUS mg/dL 4.7*  --   --  5.5* 5.3* 5.8*  --      Results from last 7 days   Lab Units 05/08/25 0222   LACTATE mmol/L 1.5     Glucose   Date/Time Value Ref Range Status   05/13/2025 0726 139 (H) 70 - 130 mg/dL Final   05/12/2025 2044 346 (H) 70 - 130 mg/dL Final   05/12/2025 1639 188 (H) 70 - 130 mg/dL Final   05/12/2025 1139 238 (H) 70 - 130 mg/dL Final   05/12/2025 0833 73 70 - 130 mg/dL Final   05/11/2025 2137 164 (H) 70 - 130 mg/dL Final   05/11/2025 1641 191 (H) 70 - 130 mg/dL Final       No radiology results for the last day    I have personally reviewed all medications:  Scheduled Medications  allopurinol, 100 mg, Oral, Daily  [Held by provider] amLODIPine, 5 mg, Oral, Q24H  atorvastatin, 80 mg, Oral, Nightly  bisoprolol, 5 mg, Oral, Daily  ferrous sulfate, 325 mg, Oral, Every Other Day  [Held by provider] furosemide, 80 mg, Intravenous, BID Diuretics  hydrALAZINE, 100 mg, Oral, Q8H  insulin glargine, 40 Units, Subcutaneous, Daily  insulin lispro, 2-9 Units, Subcutaneous, 4x Daily AC & at Bedtime  levothyroxine, 137 mcg, Oral, Q AM  pantoprazole, 40 mg, Oral, Q AM  pregabalin, 25 mg, Oral, BID  sodium bicarbonate, 650 mg, Oral, TID  sodium chloride, 10 mL, Intravenous, Q12H    Infusions   Diet  Diet: Cardiac, Renal; Healthy Heart (2-3 Na+); Low Sodium (2-3g), Low Phosphorus; Fluid Consistency: Thin (IDDSI 0)    I have personally reviewed:  [x]  Laboratory   [x]  Microbiology   [x]  Radiology   [x]  EKG/Telemetry  [x]  Cardiology/Vascular   []  Pathology    []  Records       Assessment/Plan     Active Hospital Problems    Diagnosis  POA     **Acute on chronic renal failure [N17.9, N18.9]  Yes    Diabetes [E11.9]  Unknown    PVD (peripheral vascular disease) with claudication [I73.9]  Unknown    Cellulitis [L03.90]  Unknown    Anemia [D64.9]  Unknown    HTN (hypertension) [I10]  Yes    CKD (chronic kidney disease) stage 4, GFR 15-29 ml/min [N18.4]  Yes    Dependent edema [R60.9]  Yes    Acute kidney injury [N17.9]  Yes    Hypothyroidism (acquired) [E03.9]  Yes      Resolved Hospital Problems   No resolved problems to display.       84 y.o. female admitted with Acute on chronic renal failure.    1. Acute on CKD stage IV, baseline creatinine is around 2.6 and was noted to be 3.46 upon admission.  Home torsemide has been discontinued and nephrology initiated patient on Lasix 80 mg IV every 12 hours with which creatinine initially slightly improved to 3.33 and again started worsening therefore diuretics were discontinued.  Scr is now improving and nephrology is following.     2.  Diabetes mellitus, blood sugars are dropping into 50s to 60s therefore we will cut back Lantus from 100 units to 70 units.  Will further cut down to 40u for now     3.  Hypertension, on bisoprolol, hydralazine.  Home dose Norvasc has been held secondary to lower extremity edema.    4.  Peripheral vascular disease with suspected ischemic rest pain, vascular consult has been obtained and arterial Doppler suggestive of severe disease.  Vascular surgery recommends CT angiogram with runoffs with possible intervention.  Vascular discussed above with son(Derrick) who is unsure how to proceed further given his mother stated no significant medical intervention in the past.  He would like to visit mother over the weekend to make further decisions.  Palliative care consult has been obtained as well for goals of care discussion.GOC transitioned to DNR. She is not interested in any interventions.     5.  Bilateral lower respiratory cellulitis, infectious disease evaluated and felt cellulitis is  less likely and IV Rocephin has been discontinued.    6.  Bilateral lower extremity edema, 2D echo during his hospital stay revealed normal ejection fraction.    7.  Hypothyroidism, on Synthroid.    8.  Anemia, iron panel is suggestive of iron deficiency. S/p 3 doses of IV iron    9.  Hyperlipidemia, on statins.    10.  CODE STATUS is full code.    11.  Expected discharge date, 5/15/2025. Hospice consulted       Milind Cowan MD  [facility]  05/13/25  10:56 EDT

## 2025-05-14 LAB
ALBUMIN SERPL-MCNC: 2.9 G/DL (ref 3.5–5.2)
ANION GAP SERPL CALCULATED.3IONS-SCNC: 12.5 MMOL/L (ref 5–15)
BASOPHILS # BLD AUTO: 0.1 10*3/MM3 (ref 0–0.2)
BASOPHILS NFR BLD AUTO: 0.8 % (ref 0–1.5)
BUN SERPL-MCNC: 54 MG/DL (ref 8–23)
BUN/CREAT SERPL: 16.8 (ref 7–25)
CALCIUM SPEC-SCNC: 8.9 MG/DL (ref 8.6–10.5)
CHLORIDE SERPL-SCNC: 110 MMOL/L (ref 98–107)
CO2 SERPL-SCNC: 19.5 MMOL/L (ref 22–29)
CREAT SERPL-MCNC: 3.22 MG/DL (ref 0.57–1)
DEPRECATED RDW RBC AUTO: 45.9 FL (ref 37–54)
EGFRCR SERPLBLD CKD-EPI 2021: 13.7 ML/MIN/1.73
EOSINOPHIL # BLD AUTO: 0.59 10*3/MM3 (ref 0–0.4)
EOSINOPHIL NFR BLD AUTO: 4.7 % (ref 0.3–6.2)
ERYTHROCYTE [DISTWIDTH] IN BLOOD BY AUTOMATED COUNT: 15.2 % (ref 12.3–15.4)
GLUCOSE BLDC GLUCOMTR-MCNC: 134 MG/DL (ref 70–130)
GLUCOSE BLDC GLUCOMTR-MCNC: 143 MG/DL (ref 70–130)
GLUCOSE BLDC GLUCOMTR-MCNC: 160 MG/DL (ref 70–130)
GLUCOSE BLDC GLUCOMTR-MCNC: 186 MG/DL (ref 70–130)
GLUCOSE SERPL-MCNC: 127 MG/DL (ref 65–99)
HCT VFR BLD AUTO: 25.3 % (ref 34–46.6)
HGB BLD-MCNC: 8.1 G/DL (ref 12–15.9)
IMM GRANULOCYTES # BLD AUTO: 0.11 10*3/MM3 (ref 0–0.05)
IMM GRANULOCYTES NFR BLD AUTO: 0.9 % (ref 0–0.5)
LYMPHOCYTES # BLD AUTO: 1.06 10*3/MM3 (ref 0.7–3.1)
LYMPHOCYTES NFR BLD AUTO: 8.4 % (ref 19.6–45.3)
MCH RBC QN AUTO: 26.8 PG (ref 26.6–33)
MCHC RBC AUTO-ENTMCNC: 32 G/DL (ref 31.5–35.7)
MCV RBC AUTO: 83.8 FL (ref 79–97)
MONOCYTES # BLD AUTO: 0.99 10*3/MM3 (ref 0.1–0.9)
MONOCYTES NFR BLD AUTO: 7.8 % (ref 5–12)
NEUTROPHILS NFR BLD AUTO: 77.4 % (ref 42.7–76)
NEUTROPHILS NFR BLD AUTO: 9.82 10*3/MM3 (ref 1.7–7)
NRBC BLD AUTO-RTO: 0 /100 WBC (ref 0–0.2)
PHOSPHATE SERPL-MCNC: 4.5 MG/DL (ref 2.5–4.5)
PLATELET # BLD AUTO: 305 10*3/MM3 (ref 140–450)
PMV BLD AUTO: 9.8 FL (ref 6–12)
POTASSIUM SERPL-SCNC: 4.6 MMOL/L (ref 3.5–5.2)
RBC # BLD AUTO: 3.02 10*6/MM3 (ref 3.77–5.28)
SODIUM SERPL-SCNC: 142 MMOL/L (ref 136–145)
WBC NRBC COR # BLD AUTO: 12.67 10*3/MM3 (ref 3.4–10.8)

## 2025-05-14 PROCEDURE — 63710000001 INSULIN LISPRO (HUMAN) PER 5 UNITS: Performed by: INTERNAL MEDICINE

## 2025-05-14 PROCEDURE — 85025 COMPLETE CBC W/AUTO DIFF WBC: CPT | Performed by: INTERNAL MEDICINE

## 2025-05-14 PROCEDURE — 82948 REAGENT STRIP/BLOOD GLUCOSE: CPT

## 2025-05-14 PROCEDURE — 80069 RENAL FUNCTION PANEL: CPT | Performed by: INTERNAL MEDICINE

## 2025-05-14 PROCEDURE — 63710000001 INSULIN GLARGINE PER 5 UNITS: Performed by: STUDENT IN AN ORGANIZED HEALTH CARE EDUCATION/TRAINING PROGRAM

## 2025-05-14 PROCEDURE — 25010000002 HYDROMORPHONE PER 4 MG: Performed by: INTERNAL MEDICINE

## 2025-05-14 RX ORDER — LEVOTHYROXINE SODIUM 75 UG/1
150 TABLET ORAL
Status: DISCONTINUED | OUTPATIENT
Start: 2025-05-15 | End: 2025-05-16 | Stop reason: HOSPADM

## 2025-05-14 RX ADMIN — Medication 10 ML: at 09:02

## 2025-05-14 RX ADMIN — HYDRALAZINE HYDROCHLORIDE 100 MG: 50 TABLET ORAL at 06:50

## 2025-05-14 RX ADMIN — INSULIN GLARGINE 40 UNITS: 100 INJECTION, SOLUTION SUBCUTANEOUS at 09:01

## 2025-05-14 RX ADMIN — SODIUM BICARBONATE 650 MG: 650 TABLET ORAL at 21:29

## 2025-05-14 RX ADMIN — ATORVASTATIN CALCIUM 80 MG: 80 TABLET, FILM COATED ORAL at 21:29

## 2025-05-14 RX ADMIN — HYDRALAZINE HYDROCHLORIDE 100 MG: 50 TABLET ORAL at 21:29

## 2025-05-14 RX ADMIN — SODIUM BICARBONATE 650 MG: 650 TABLET ORAL at 17:01

## 2025-05-14 RX ADMIN — PREGABALIN 25 MG: 25 CAPSULE ORAL at 09:01

## 2025-05-14 RX ADMIN — Medication 2.5 MG: at 21:29

## 2025-05-14 RX ADMIN — Medication 10 ML: at 21:31

## 2025-05-14 RX ADMIN — HYDRALAZINE HYDROCHLORIDE 100 MG: 50 TABLET ORAL at 14:44

## 2025-05-14 RX ADMIN — LEVOTHYROXINE SODIUM 137 MCG: 137 TABLET ORAL at 06:50

## 2025-05-14 RX ADMIN — INSULIN LISPRO 2 UNITS: 100 INJECTION, SOLUTION INTRAVENOUS; SUBCUTANEOUS at 18:40

## 2025-05-14 RX ADMIN — HYDROCODONE BITARTRATE AND ACETAMINOPHEN 1 TABLET: 7.5; 325 TABLET ORAL at 03:38

## 2025-05-14 RX ADMIN — ALLOPURINOL 100 MG: 100 TABLET ORAL at 09:01

## 2025-05-14 RX ADMIN — SODIUM BICARBONATE 650 MG: 650 TABLET ORAL at 09:01

## 2025-05-14 RX ADMIN — BISOPROLOL FUMARATE 10 MG: 5 TABLET ORAL at 09:01

## 2025-05-14 RX ADMIN — INSULIN LISPRO 2 UNITS: 100 INJECTION, SOLUTION INTRAVENOUS; SUBCUTANEOUS at 21:29

## 2025-05-14 RX ADMIN — PREGABALIN 25 MG: 25 CAPSULE ORAL at 21:29

## 2025-05-14 RX ADMIN — HYDROCODONE BITARTRATE AND ACETAMINOPHEN 1 TABLET: 7.5; 325 TABLET ORAL at 21:29

## 2025-05-14 RX ADMIN — PANTOPRAZOLE SODIUM 40 MG: 40 TABLET, DELAYED RELEASE ORAL at 06:50

## 2025-05-14 RX ADMIN — HYDROXYZINE HYDROCHLORIDE 25 MG: 25 TABLET, FILM COATED ORAL at 14:44

## 2025-05-14 RX ADMIN — HYDROMORPHONE HYDROCHLORIDE 0.5 MG: 1 INJECTION, SOLUTION INTRAMUSCULAR; INTRAVENOUS; SUBCUTANEOUS at 04:30

## 2025-05-14 RX ADMIN — HYDROCODONE BITARTRATE AND ACETAMINOPHEN 1 TABLET: 7.5; 325 TABLET ORAL at 10:03

## 2025-05-14 NOTE — CONSULTS
Assessed patient at bedside. Patient was drowsy & could not stay awake for conversation. Patient did report 9/10 BLE pain though.     [facility] has attempted to call son several times to set up an appointment but has unable to get ahold on him. Voicemail is full so unable to leave a message.     [facility] RN attempted to give explanation of services to patient, but patient was not alert or oriented & could not contribute to the conversation. RN attempted to keep patient awake but patient kept falling asleep. RN left a [facility] booklet w/ phone number & patient ID at bedside.    [facility] will follow-up remotely daily for discharge plans. Updated PAUL Mccullough. Please call w/ any questions, concerns, or change in patient status. Thank you for allowing us the opportunity to participate in the care of this patient.     Holly Holder RN  Referral & Admission Coordinator  [facility]  [phone]

## 2025-05-14 NOTE — PROGRESS NOTES
[facility] Progress Note      Patient Name: Halie Guidry  : 1940  MRN: [id number]  Primary Care Physician:  Rossy Laird APRN  Date of admission: 2025    Subjective     Interval History:   Follow-up VIPUL on CKD 4. Feels miserable.  Still having significant leg pain.  Eating.  Bowels moving.  Urine output not all recorded.  Blood pressure 150-170.  On room air.  Itching.    Review of Systems:   As noted above    Objective     Vitals:   Temp:  [98.1 °F (36.7 °C)-99.1 °F (37.3 °C)] 99.1 °F (37.3 °C)  Heart Rate:  [68-77] 68  Resp:  [16] 16  BP: (158-180)/(54-71) 158/71    Intake/Output Summary (Last 24 hours) at 2025 1228  Last data filed at 2025 0901  Gross per 24 hour   Intake 460 ml   Output 900 ml   Net -440 ml       Physical Exam:    General Appearance: Eyes closed but awakens easily.  Mild distress due to discomfort in her legs.  Skin: warm and dry  HEENT: oral mucosa dry, nonicteric sclera  Neck: supple, no JVD  Lungs: Clear to auscultation bilaterally.  Unlabored on room air  Heart: RRR, normal S1 and S2  Abdomen: soft, nontender, active bowel sounds, nondistended  : no palpable bladder  Extremities:2+ lower ext edema .  Neuro: normal speech and mental status     Scheduled Meds:     allopurinol, 100 mg, Oral, Daily  [Held by provider] amLODIPine, 5 mg, Oral, Q24H  atorvastatin, 80 mg, Oral, Nightly  bisoprolol, 10 mg, Oral, Daily  ferrous sulfate, 325 mg, Oral, Every Other Day  [Held by provider] furosemide, 80 mg, Intravenous, BID Diuretics  hydrALAZINE, 100 mg, Oral, Q8H  insulin glargine, 40 Units, Subcutaneous, Daily  insulin lispro, 2-9 Units, Subcutaneous, 4x Daily AC & at Bedtime  levothyroxine, 137 mcg, Oral, Q AM  pantoprazole, 40 mg, Oral, Q AM  pregabalin, 25 mg, Oral, BID  sodium bicarbonate, 650 mg, Oral, TID  sodium chloride, 10 mL, Intravenous, Q12H      IV Meds:        Results Reviewed:   I have personally reviewed the results  from the time of this admission to 5/14/2025 12:28 EDT     Results from last 7 days   Lab Units 05/14/25 0331 05/13/25 0438 05/12/25  0951   SODIUM mmol/L 142 139 138   POTASSIUM mmol/L 4.6 4.3 4.6   CHLORIDE mmol/L 110* 106 109*   CO2 mmol/L 19.5* 18.5* 19.4*   BUN mg/dL 54* 56* 58*   CREATININE mg/dL 3.22* 3.25* 3.20*   CALCIUM mg/dL 8.9 8.5* 8.6   GLUCOSE mg/dL 127* 134* 158*       Estimated Creatinine Clearance: 19 mL/min (A) (by C-G formula based on SCr of 3.22 mg/dL (H)).    Results from last 7 days   Lab Units 05/14/25  0331 05/13/25  0438 05/10/25  0314 05/09/25  0614 05/08/25  0222 05/07/25  1706   MAGNESIUM mg/dL  --   --   --  1.6  --  1.7   PHOSPHORUS mg/dL 4.5 4.7* 5.5* 5.3*   < >  --     < > = values in this interval not displayed.       Results from last 7 days   Lab Units 05/09/25 0614 05/08/25 0222   URIC ACID mg/dL 12.0* 10.4*       Results from last 7 days   Lab Units 05/14/25 0331 05/13/25 0438 05/12/25  0951 05/11/25  0542 05/10/25  0314   WBC 10*3/mm3 12.67* 13.13* 14.12* 13.23* 13.38*   HEMOGLOBIN g/dL 8.1* 8.0* 8.4* 8.6* 7.9*   PLATELETS 10*3/mm3 305 303 308 308 282             Assessment / Plan     ASSESSMENT:  Acute kidney injury on CKD 4 baseline creatinine 2.3-2.6.  Peak creatinine 3.65 925.  Now stable at 3.2.  Potassium normal.  Anion gap metabolic acidosis due to renal failure. No intervention planned.  No dialysis per her wishes ( no acute indication).  Will stop drawing chemistries .  Hypertension with CKD not optimally controlled.  Increase bisoprolol yesterday.    Diabetes mellitus type 2 with CKD  Lower extremity wound with ischemic rest pain.  Significant peripheral arterial disease.  Goals of care noted and palliative care consult.  Likely no further procedures.  5.  Anemia of chronic kidney disease.  6.  Hypothyroid on replacement. TSH 15. Increase replacement .  PLAN:  Increase thyroid supplement .  2.  Hospice has been consulted.  Thank you for involving us in the care  of Halie Guidry.  Please feel free to call with any questions.    Mary Rice MD  05/14/25  12:28 EDT    [facility]  [phone]    Please note that portions of this note were completed with a voice recognition program.

## 2025-05-14 NOTE — PLAN OF CARE
Goal Outcome Evaluation:  Plan of Care Reviewed With: patient        Progress: no change  Outcome Evaluation: Vitals stable. Continues to c/o pain in BLE, PRN pain meds provided and atarax for itching. Remains in contact isolation for VRE. Hospice attempted to see patient today. Did not work with PT today. Potential discharge tomorrow.

## 2025-05-14 NOTE — PROGRESS NOTES
".        [facility]   Inpatient Palliative Care Follow up  Date: 2025   Reason for follow up: support with goals of care, hospice conversation, symptom management      Interval History    Information obtained from: patient    Follow up Information: Patient resting in bed. She reports pain in lower legs and itching. No family present.I reviewed labs, medical notes, nursing notes, MAR and I/O. I informed RN.       [place] Symptom Assessment Scale (ESAS): ESAS completed by Patient  Pain: 6  Tiredness: 7  Drowsiness: 0 No drowsiness  Nausea: 0 No nausea  Appetite: 0 No lack of appetite  Shortness of breath: 0 No shortness of breath  Depression: 0 No depression  Anxiety: 0 No anxiety  Best Well[facility]  Other(Constipation): 0 No constipation    Discussion of Patient/Family Treatment Preferences and Goals of Care: There was a voluntary discussion of advance planning and goals of care discussion. The following were present for the visit: patient    Summary of Discussion: I had follow up conversation with patient. She remains cognitively intact. I explained to her that hospice has had difficulty in arranging meeting her son, Derrick and he is not answering calls and/or unable to leave messages due to voicemail being full. I asked her if she was ok with hospice reaching out to her directly and she was.  I spoke with hospice liasion and provided her with patient cell phone number. She does report having more pain today. Could consider increasing norco 7.5 mg every 4 hours. Also complained of itching (has vistaril 25 mg TID PRN).  I updated   MD, RN.         Physical Assessment   /57 (BP Location: Right arm, Patient Position: Lying)   Pulse 69   Temp 98.6 °F (37 °C) (Oral)   Resp 18   Ht 172.7 cm (67.99\")   Wt 135 kg (297 lb 9.9 oz)   SpO2 95%   BMI 45.27 kg/m²    Palliative Performance Scale: Performance 50% based on the following measures: Ambulation: Mainly sit or lie down, Activity and " Evidence of Disease: Unable to do any work, extensive evidence of disease, Self-Care: Considerable assistance required,  Intake: Normal or reduced, LOC: Full or confusion  Physical Exam  Cardiovascular:      Rate and Rhythm: Normal rate.   Pulmonary:      Effort: Pulmonary effort is normal.   Abdominal:      Palpations: Abdomen is soft.      Comments: obese   Musculoskeletal:      Right lower le+ Pitting Edema present.      Left lower le+ Pitting Edema present.   Neurological:      Mental Status: She is alert and oriented to person, place, and time.   Psychiatric:         Mood and Affect: Mood and affect normal.         Speech: Speech normal.         Behavior: Behavior is cooperative.         Thought Content: Thought content normal.         Cognition and Memory: Cognition normal.         Judgment: Judgment normal.                Data (labs/images reviewed)    WBC   Date Value Ref Range Status   2025 12.67 (H) 3.40 - 10.80 10*3/mm3 Final     RBC   Date Value Ref Range Status   2025 3.02 (L) 3.77 - 5.28 10*6/mm3 Final     Hemoglobin   Date Value Ref Range Status   2025 8.1 (L) 12.0 - 15.9 g/dL Final     Hematocrit   Date Value Ref Range Status   2025 25.3 (L) 34.0 - 46.6 % Final     MCV   Date Value Ref Range Status   2025 83.8 79.0 - 97.0 fL Final     MCH   Date Value Ref Range Status   2025 26.8 26.6 - 33.0 pg Final     MCHC   Date Value Ref Range Status   2025 32.0 31.5 - 35.7 g/dL Final     RDW   Date Value Ref Range Status   2025 15.2 12.3 - 15.4 % Final     RDW-SD   Date Value Ref Range Status   2025 45.9 37.0 - 54.0 fl Final     MPV   Date Value Ref Range Status   2025 9.8 6.0 - 12.0 fL Final     Platelets   Date Value Ref Range Status   2025 305 140 - 450 10*3/mm3 Final     Neutrophil %   Date Value Ref Range Status   2025 77.4 (H) 42.7 - 76.0 % Final     Lymphocyte %   Date Value Ref Range Status   2025 8.4 (L) 19.6 - 45.3 %  Final     Monocyte %   Date Value Ref Range Status   05/14/2025 7.8 5.0 - 12.0 % Final     Eosinophil %   Date Value Ref Range Status   05/14/2025 4.7 0.3 - 6.2 % Final     Basophil %   Date Value Ref Range Status   05/14/2025 0.8 0.0 - 1.5 % Final     Immature Grans %   Date Value Ref Range Status   05/14/2025 0.9 (H) 0.0 - 0.5 % Final     Neutrophils, Absolute   Date Value Ref Range Status   05/14/2025 9.82 (H) 1.70 - 7.00 10*3/mm3 Final     Lymphocytes, Absolute   Date Value Ref Range Status   05/14/2025 1.06 0.70 - 3.10 10*3/mm3 Final     Monocytes, Absolute   Date Value Ref Range Status   05/14/2025 0.99 (H) 0.10 - 0.90 10*3/mm3 Final     Eosinophils, Absolute   Date Value Ref Range Status   05/14/2025 0.59 (H) 0.00 - 0.40 10*3/mm3 Final     Basophils, Absolute   Date Value Ref Range Status   05/14/2025 0.10 0.00 - 0.20 10*3/mm3 Final     Immature Grans, Absolute   Date Value Ref Range Status   05/14/2025 0.11 (H) 0.00 - 0.05 10*3/mm3 Final     nRBC   Date Value Ref Range Status   05/14/2025 0.0 0.0 - 0.2 /100 WBC Final       Lab Results   Component Value Date    GLUCOSE 127 (H) 05/14/2025    BUN 54 (H) 05/14/2025    CREATININE 3.22 (H) 05/14/2025     05/14/2025    K 4.6 05/14/2025     (H) 05/14/2025    CALCIUM 8.9 05/14/2025    PROTEINTOT 8.1 04/17/2025    ALBUMIN 2.9 (L) 05/14/2025    ALT 5 04/17/2025    AST 8 04/17/2025    ALKPHOS 175 (H) 04/17/2025    BILITOT 0.2 04/17/2025    GLOB 4.8 04/17/2025    AGRATIO 0.7 04/17/2025    BCR 16.8 05/14/2025    ANIONGAP 12.5 05/14/2025    EGFR 13.7 (L) 05/14/2025       US Renal Bilateral  US RENAL BILATERAL-     Clinical: Acute renal insufficiency     COMPARISON 4/30/2024     FINDINGS: The right kidney measures 10.1 cm in length and the left  kidney measures 10.8 cm in length. Renal cortical echotexture is greater  than anticipated consistent with medical renal disease. No  hydronephrosis on the right or left. Bilateral renal cysts again  demonstrated with  the largest on the right measuring 2.3 cm in maximum  diameter and the largest on the left measuring 3.8 cm in maximum  diameter. No renal calculus. Limited images of the bladder fail to  demonstrate either ureteral jet. Small amount of suspended debris is  suggested within the bladder lumen.     CONCLUSION: Renal cortical echotexture consistent with medical renal  disease, no hydronephrosis. Bilateral renal cysts. Neither ureteral jet  could be documented.     This report was finalized on 5/9/2025 7:28 PM by Dr. Eduardo Lopez M.D  on Workstation: [id number]     Adult Transthoracic Echo Complete W/ Cont if Necessary Per Protocol    Left ventricular ejection fraction appears to be 56 - 60%.    Left ventricular diastolic function was normal.         Palliative Care Assessment and Recommendations  Palliative Care Assessment and Recommendations  Summary/Assessment:no new complaints-reports needing more pain medications and itching  Prognosis and Palliative Performance Scale:  Performance 50% based on the following measures: Ambulation: Mainly sit or lie down, Activity and Evidence of Disease: Unable to do any work, extensive evidence of disease, Self-Care: Considerable assistance required,  Intake: Normal or reduced, LOC: Full or confusion  Disease State: Controlled with current treatments  Symptom Management:   Pain: Norco 7.5/325 mg every 6 hours prn   Shortness of Breath: n/a  Constipation: n.a  Other Palliative Symptoms: itching (vistaril 25 mg TID PRN)-effective  Goals of Care Treatment Preferences   Palliative Care Decision Making Capacity: intact  Healthcare Surrogate: Yes  What is Most important to patient/family at this time:   Code Status DNR  Other Considerations regarding life sustaining treatments:   Advance Directives Present or Completed: Michele  Other Recommendations:  Hospice to contact patient directly due to patient son not available/answering calls   Consider increasing norco 7.5/325 mg every  4 hours prn  Follow up: prn  Discussed plan with: Sadia Solorzano, RN, patient and CCP       I spent a total of 35  minutes today caring for patient including reviewing records, speaking with patient/family and collaborating with care team.           Thank you for consulting palliative care. If you need to reach the provider on call for the palliative care team please call [phone]    SERGE Francis  5/14/2025 15:11 EDT

## 2025-05-14 NOTE — PROGRESS NOTES
"Nutrition Services    Patient Name: Halie Guidry  YOB: 1940  MRN: [id number]  Admission date: 5/7/2025    Assessment Date:  05/14/25    NUTRITION EVALUATION      Reason for Encounter Pressure Injury and/or Non-Healing Wound   Diagnosis/Problem Admission Diagnosis:  Peripheral edema [R60.0]  Acute on chronic renal failure [N17.9, N18.9]  Chronic anemia [D64.9]  Acute renal failure superimposed on chronic kidney disease, unspecified acute renal failure type, unspecified CKD stage [N17.9, N18.9]    Problem List:    Acute on chronic renal failure    Hypothyroidism (acquired)    Acute kidney injury    CKD (chronic kidney disease) stage 4, GFR 15-29 ml/min    HTN (hypertension)    Dependent edema    Diabetes    PVD (peripheral vascular disease) with claudication    Cellulitis    Anemia     Narrative 84 yoF admitted with acute on chronic renal failure. Nephrology managing diuretics.  Likely transitioning to hospice.   PMH DM, anemia, HTN, CKD, VIPUL   PO Diet Diet: Cardiac, Renal; Healthy Heart (2-3 Na+); Low Sodium (2-3g), Low Phosphorus; Fluid Consistency: Thin (IDDSI 0)   Allergies NKFA   Supplements n/a   PO Intake % %       Chewing/Swallowing Difficulty no issues identified at this time       Medications reviewed   Labs  reviewed       Physical Findings alert, disoriented, room air     Edema 1+ (trace)    GI Function fecal incontinence, last bowel movement: 5/13   Skin Status location of wound: R lower leg, L anterior foot, bilateral gluteal      Lines/Drains none   I/O reviewed    Height  Weight  BMI  Weight Trend     Height: 172.7 cm (67.99\")  Weight: 135 kg (297 lb 9.9 oz) (05/13/25 0500)  Body mass index is 45.27 kg/m².  Stable, Gain    Weight change:  waiting for updated weight d/t large discrepancy in the last couple days        NFPE Not indicated at this time       Nutrition Problem (PES) Problem: Increased Nutrient Needs  Etiology: MNT for Treatment/Condition   Signs/Symptoms: Protein " and Other (comment) loss of skin integrity        Intervention/Plan CTM oral intakes and encourage PO >75%  High protein diet recommended for wound healing, but also contraindicated with CKD. Continue with renal, heart healthy diet as ordered.     RD to follow up per protocol.     Results from last 7 days   Lab Units 05/14/25  0331 05/13/25  0438 05/12/25  0951   SODIUM mmol/L 142 139 138   POTASSIUM mmol/L 4.6 4.3 4.6   CHLORIDE mmol/L 110* 106 109*   CO2 mmol/L 19.5* 18.5* 19.4*   BUN mg/dL 54* 56* 58*   CREATININE mg/dL 3.22* 3.25* 3.20*   CALCIUM mg/dL 8.9 8.5* 8.6   GLUCOSE mg/dL 127* 134* 158*     Results from last 7 days   Lab Units 05/14/25  0331 05/10/25  0314 05/09/25  0614 05/08/25  0222 05/07/25  1706   MAGNESIUM mg/dL  --   --  1.6  --  1.7   PHOSPHORUS mg/dL 4.5   < > 5.3*   < >  --    HEMOGLOBIN g/dL 8.1*   < > 8.6*   < > 8.5*   HEMATOCRIT % 25.3*   < > 26.0*   < > 26.2*    < > = values in this interval not displayed.     Lab Results   Component Value Date    HGBA1C 9.10 (H) 11/06/2024     Wt Readings from Last 10 Encounters:   05/13/25 135 kg (297 lb 9.9 oz)   04/08/25 97.1 kg (214 lb)   02/18/25 97.4 kg (214 lb 11.7 oz)   01/15/25 101 kg (223 lb 12.3 oz)   11/11/24 92.1 kg (203 lb 0.7 oz)   10/19/24 98.2 kg (216 lb 7.9 oz)   09/16/24 81.6 kg (180 lb)   09/10/24 77.2 kg (170 lb 3.2 oz)   09/04/24 96.7 kg (213 lb 3 oz)   07/18/24 77.1 kg (169 lb 15.6 oz)       Electronically signed by:  Aundrea Lehman RD  05/14/25 12:15 EDT

## 2025-05-14 NOTE — SIGNIFICANT NOTE
05/14/25 1211   OTHER   Discipline physical therapist   Rehab Time/Intention   Session Not Performed other (see comments)  (Pt asleep upon entering room and when awake, states she does not want to participate in PT and would rather rest. Pt refuses after education and denies wanting to do any bed level exercises. Pt will f/u.)   Recommendation   PT - Next Appointment 05/15/25

## 2025-05-14 NOTE — PROGRESS NOTES
Name: Halie Guidry ADMIT: 2025   : 1940  PCP: Rossy Laird APRN    MRN: [id number] LOS: 6 days   AGE/SEX: 84 y.o. female  ROOM: [facility]     Subjective   Subjective   Examined at bedside.        Objective   Objective   Vital Signs  Temp:  [98.1 °F (36.7 °C)-99.1 °F (37.3 °C)] 99.1 °F (37.3 °C)  Heart Rate:  [68-77] 68  Resp:  [16] 16  BP: (158-180)/(54-71) 158/71  SpO2:  [93 %-100 %] 94 %  on   ;   Device (Oxygen Therapy): room air  Body mass index is 45.27 kg/m².  Physical Exam  Constitutional:       General: She is not in acute distress.     Appearance: She is ill-appearing.   HENT:      Head: Normocephalic and atraumatic.   Cardiovascular:      Rate and Rhythm: Normal rate and regular rhythm.   Pulmonary:      Effort: Pulmonary effort is normal. No respiratory distress.   Abdominal:      General: There is no distension.      Palpations: Abdomen is soft.      Tenderness: There is no abdominal tenderness.   Musculoskeletal:      Comments: Tender to palpation on both lower extremities with erythema    Neurological:      Mental Status: She is oriented to person, place, and time.      Motor: Weakness present.         Results Review     I reviewed the patient's new clinical results.  Results from last 7 days   Lab Units 25  03325  0951 25  0542   WBC 10*3/mm3 12.67* 13.13* 14.12* 13.23*   HEMOGLOBIN g/dL 8.1* 8.0* 8.4* 8.6*   PLATELETS 10*3/mm3 305 303 308 308     Results from last 7 days   Lab Units 25  03325  0438 25  0951 25  0542   SODIUM mmol/L 142 139 138 143   POTASSIUM mmol/L 4.6 4.3 4.6 4.2   CHLORIDE mmol/L 110* 106 109* 110*   CO2 mmol/L 19.5* 18.5* 19.4* 17.2*   BUN mg/dL 54* 56* 58* 62*   CREATININE mg/dL 3.22* 3.25* 3.20* 3.24*   GLUCOSE mg/dL 127* 134* 158* 62*   EGFR mL/min/1.73 13.7* 13.5* 13.8* 13.6*     Results from last 7 days   Lab Units 25  0331 25  0438 05/10/25  0314 25  0614   ALBUMIN g/dL  2.9* 3.0* 3.1* 3.2*     Results from last 7 days   Lab Units 05/14/25  0331 05/13/25  0438 05/12/25  0951 05/11/25  0542 05/10/25  0314 05/09/25  0614 05/08/25  0222 05/07/25  1706   CALCIUM mg/dL 8.9 8.5* 8.6 8.3* 8.1* 8.1*   < > 8.2*   ALBUMIN g/dL 2.9* 3.0*  --   --  3.1* 3.2*   < >  --    MAGNESIUM mg/dL  --   --   --   --   --  1.6  --  1.7   PHOSPHORUS mg/dL 4.5 4.7*  --   --  5.5* 5.3*   < >  --     < > = values in this interval not displayed.     Results from last 7 days   Lab Units 05/08/25  0222   LACTATE mmol/L 1.5     Glucose   Date/Time Value Ref Range Status   05/14/2025 1152 134 (H) 70 - 130 mg/dL Final   05/14/2025 0807 143 (H) 70 - 130 mg/dL Final   05/13/2025 2047 144 (H) 70 - 130 mg/dL Final   05/13/2025 1633 177 (H) 70 - 130 mg/dL Final   05/13/2025 1134 197 (H) 70 - 130 mg/dL Final   05/13/2025 0726 139 (H) 70 - 130 mg/dL Final   05/12/2025 2044 346 (H) 70 - 130 mg/dL Final       No radiology results for the last day    I have personally reviewed all medications:  Scheduled Medications  allopurinol, 100 mg, Oral, Daily  [Held by provider] amLODIPine, 5 mg, Oral, Q24H  atorvastatin, 80 mg, Oral, Nightly  bisoprolol, 10 mg, Oral, Daily  ferrous sulfate, 325 mg, Oral, Every Other Day  [Held by provider] furosemide, 80 mg, Intravenous, BID Diuretics  hydrALAZINE, 100 mg, Oral, Q8H  insulin glargine, 40 Units, Subcutaneous, Daily  insulin lispro, 2-9 Units, Subcutaneous, 4x Daily AC & at Bedtime  levothyroxine, 137 mcg, Oral, Q AM  pantoprazole, 40 mg, Oral, Q AM  pregabalin, 25 mg, Oral, BID  sodium bicarbonate, 650 mg, Oral, TID  sodium chloride, 10 mL, Intravenous, Q12H    Infusions   Diet  Diet: Cardiac, Renal; Healthy Heart (2-3 Na+); Low Sodium (2-3g), Low Phosphorus; Fluid Consistency: Thin (IDDSI 0)    I have personally reviewed:  [x]  Laboratory   [x]  Microbiology   [x]  Radiology   [x]  EKG/Telemetry  [x]  Cardiology/Vascular   []  Pathology    []  Records       Assessment/Plan     Active  Hospital Problems    Diagnosis  POA    **Acute on chronic renal failure [N17.9, N18.9]  Yes    Diabetes [E11.9]  Unknown    PVD (peripheral vascular disease) with claudication [I73.9]  Unknown    Cellulitis [L03.90]  Unknown    Anemia [D64.9]  Unknown    HTN (hypertension) [I10]  Yes    CKD (chronic kidney disease) stage 4, GFR 15-29 ml/min [N18.4]  Yes    Dependent edema [R60.9]  Yes    Acute kidney injury [N17.9]  Yes    Hypothyroidism (acquired) [E03.9]  Yes      Resolved Hospital Problems   No resolved problems to display.       84 y.o. female admitted with Acute on chronic renal failure.    1. Acute on CKD stage IV, baseline creatinine is around 2.6 and was noted to be 3.46 upon admission.  Home torsemide has been discontinued and nephrology initiated patient on Lasix 80 mg IV every 12 hours with which creatinine initially slightly improved to 3.33 and again started worsening therefore diuretics were discontinued.  Scr is now improving and nephrology is following.     2.  Diabetes mellitus, blood sugars are dropping into 50s to 60s therefore we will cut back Lantus from 100 units to 70 units.  Will further cut down to 40u for now- BG acceptable      3.  Hypertension, on bisoprolol, hydralazine.  Home dose Norvasc has been held secondary to lower extremity edema.    4.  Peripheral vascular disease with suspected ischemic rest pain, vascular consult has been obtained and arterial Doppler suggestive of severe disease.  Vascular surgery recommends CT angiogram with runoffs with possible intervention.  Vascular discussed above with son(Derrick) who is unsure how to proceed further given his mother stated no significant medical intervention in the past.  He would like to visit mother over the weekend to make further decisions.  Palliative care consult has been obtained as well for goals of care discussion.GOC transitioned to DNR. She is not interested in any interventions. Will likely be transitioned to hospice      5.  Bilateral lower respiratory cellulitis, infectious disease evaluated and felt cellulitis is less likely and IV Rocephin has been discontinued.    6.  Bilateral lower extremity edema, 2D echo during his hospital stay revealed normal ejection fraction.    7.  Hypothyroidism, on Synthroid.    8.  Anemia, iron panel is suggestive of iron deficiency. S/p 3 doses of IV iron    9.  Hyperlipidemia, on statins.    10.  CODE STATUS is full code.    11.  Expected discharge date, 5/15/2025. Hospice consulted       Milind Cowan MD  [facility]  05/14/25  11:59 EDT

## 2025-05-14 NOTE — PLAN OF CARE
Problem: Adult Inpatient Plan of Care  Goal: Plan of Care Review  Outcome: Progressing  Flowsheets (Taken 5/14/2025 0405)  Progress: no change  Outcome Evaluation: Vitals stable, PRO norco for pain on BLE, q2hr turn, dressing changed this am, potential discharge today if hospice done following up with family, plan of care on going.  Plan of Care Reviewed With: patient

## 2025-05-15 LAB
BASOPHILS # BLD AUTO: 0.09 10*3/MM3 (ref 0–0.2)
BASOPHILS NFR BLD AUTO: 0.7 % (ref 0–1.5)
DEPRECATED RDW RBC AUTO: 46.8 FL (ref 37–54)
EOSINOPHIL # BLD AUTO: 0.54 10*3/MM3 (ref 0–0.4)
EOSINOPHIL NFR BLD AUTO: 4.2 % (ref 0.3–6.2)
ERYTHROCYTE [DISTWIDTH] IN BLOOD BY AUTOMATED COUNT: 15 % (ref 12.3–15.4)
GLUCOSE BLDC GLUCOMTR-MCNC: 126 MG/DL (ref 70–130)
GLUCOSE BLDC GLUCOMTR-MCNC: 139 MG/DL (ref 70–130)
GLUCOSE BLDC GLUCOMTR-MCNC: 171 MG/DL (ref 70–130)
GLUCOSE BLDC GLUCOMTR-MCNC: 99 MG/DL (ref 70–130)
HCT VFR BLD AUTO: 25.6 % (ref 34–46.6)
HGB BLD-MCNC: 8.1 G/DL (ref 12–15.9)
IMM GRANULOCYTES # BLD AUTO: 0.1 10*3/MM3 (ref 0–0.05)
IMM GRANULOCYTES NFR BLD AUTO: 0.8 % (ref 0–0.5)
LYMPHOCYTES # BLD AUTO: 0.98 10*3/MM3 (ref 0.7–3.1)
LYMPHOCYTES NFR BLD AUTO: 7.6 % (ref 19.6–45.3)
MCH RBC QN AUTO: 27.4 PG (ref 26.6–33)
MCHC RBC AUTO-ENTMCNC: 31.6 G/DL (ref 31.5–35.7)
MCV RBC AUTO: 86.5 FL (ref 79–97)
MONOCYTES # BLD AUTO: 1.1 10*3/MM3 (ref 0.1–0.9)
MONOCYTES NFR BLD AUTO: 8.5 % (ref 5–12)
NEUTROPHILS NFR BLD AUTO: 10.11 10*3/MM3 (ref 1.7–7)
NEUTROPHILS NFR BLD AUTO: 78.2 % (ref 42.7–76)
NRBC BLD AUTO-RTO: 0 /100 WBC (ref 0–0.2)
PLATELET # BLD AUTO: 310 10*3/MM3 (ref 140–450)
PMV BLD AUTO: 9.9 FL (ref 6–12)
RBC # BLD AUTO: 2.96 10*6/MM3 (ref 3.77–5.28)
WBC NRBC COR # BLD AUTO: 12.92 10*3/MM3 (ref 3.4–10.8)

## 2025-05-15 PROCEDURE — 97530 THERAPEUTIC ACTIVITIES: CPT

## 2025-05-15 PROCEDURE — 63710000001 INSULIN LISPRO (HUMAN) PER 5 UNITS: Performed by: INTERNAL MEDICINE

## 2025-05-15 PROCEDURE — 82948 REAGENT STRIP/BLOOD GLUCOSE: CPT

## 2025-05-15 PROCEDURE — 25010000002 HYDROMORPHONE PER 4 MG: Performed by: INTERNAL MEDICINE

## 2025-05-15 PROCEDURE — 85025 COMPLETE CBC W/AUTO DIFF WBC: CPT | Performed by: INTERNAL MEDICINE

## 2025-05-15 PROCEDURE — 63710000001 INSULIN GLARGINE PER 5 UNITS: Performed by: STUDENT IN AN ORGANIZED HEALTH CARE EDUCATION/TRAINING PROGRAM

## 2025-05-15 RX ADMIN — POLYETHYLENE GLYCOL 3350 17 G: 17 POWDER, FOR SOLUTION ORAL at 03:24

## 2025-05-15 RX ADMIN — PANTOPRAZOLE SODIUM 40 MG: 40 TABLET, DELAYED RELEASE ORAL at 06:11

## 2025-05-15 RX ADMIN — Medication 10 ML: at 20:46

## 2025-05-15 RX ADMIN — HYDROCODONE BITARTRATE AND ACETAMINOPHEN 1 TABLET: 7.5; 325 TABLET ORAL at 03:24

## 2025-05-15 RX ADMIN — HYDRALAZINE HYDROCHLORIDE 100 MG: 50 TABLET ORAL at 14:17

## 2025-05-15 RX ADMIN — HYDROCODONE BITARTRATE AND ACETAMINOPHEN 1 TABLET: 7.5; 325 TABLET ORAL at 16:16

## 2025-05-15 RX ADMIN — HYDROMORPHONE HYDROCHLORIDE 0.5 MG: 1 INJECTION, SOLUTION INTRAMUSCULAR; INTRAVENOUS; SUBCUTANEOUS at 12:06

## 2025-05-15 RX ADMIN — HYDROCODONE BITARTRATE AND ACETAMINOPHEN 1 TABLET: 7.5; 325 TABLET ORAL at 10:11

## 2025-05-15 RX ADMIN — LEVOTHYROXINE SODIUM 150 MCG: 0.07 TABLET ORAL at 06:11

## 2025-05-15 RX ADMIN — FERROUS SULFATE TAB 325 MG (65 MG ELEMENTAL FE) 325 MG: 325 (65 FE) TAB at 10:11

## 2025-05-15 RX ADMIN — HYDROXYZINE HYDROCHLORIDE 25 MG: 25 TABLET, FILM COATED ORAL at 22:26

## 2025-05-15 RX ADMIN — SODIUM BICARBONATE 650 MG: 650 TABLET ORAL at 10:11

## 2025-05-15 RX ADMIN — INSULIN GLARGINE 40 UNITS: 100 INJECTION, SOLUTION SUBCUTANEOUS at 10:12

## 2025-05-15 RX ADMIN — PREGABALIN 25 MG: 25 CAPSULE ORAL at 10:11

## 2025-05-15 RX ADMIN — PREGABALIN 25 MG: 25 CAPSULE ORAL at 20:46

## 2025-05-15 RX ADMIN — HYDRALAZINE HYDROCHLORIDE 100 MG: 50 TABLET ORAL at 06:11

## 2025-05-15 RX ADMIN — HYDROXYZINE HYDROCHLORIDE 25 MG: 25 TABLET, FILM COATED ORAL at 14:17

## 2025-05-15 RX ADMIN — HYDROCODONE BITARTRATE AND ACETAMINOPHEN 1 TABLET: 7.5; 325 TABLET ORAL at 22:25

## 2025-05-15 RX ADMIN — ATORVASTATIN CALCIUM 80 MG: 80 TABLET, FILM COATED ORAL at 20:46

## 2025-05-15 RX ADMIN — SODIUM BICARBONATE 650 MG: 650 TABLET ORAL at 20:46

## 2025-05-15 RX ADMIN — INSULIN LISPRO 2 UNITS: 100 INJECTION, SOLUTION INTRAVENOUS; SUBCUTANEOUS at 17:00

## 2025-05-15 RX ADMIN — SODIUM BICARBONATE 650 MG: 650 TABLET ORAL at 16:16

## 2025-05-15 RX ADMIN — BISOPROLOL FUMARATE 10 MG: 5 TABLET ORAL at 10:11

## 2025-05-15 RX ADMIN — Medication 10 ML: at 10:12

## 2025-05-15 RX ADMIN — HYDRALAZINE HYDROCHLORIDE 100 MG: 50 TABLET ORAL at 20:46

## 2025-05-15 RX ADMIN — ALLOPURINOL 100 MG: 100 TABLET ORAL at 10:11

## 2025-05-15 NOTE — PROGRESS NOTES
Name: Halie Guidry ADMIT: 2025   : 1940  PCP: Rossy Laird APRN    MRN: [id number] LOS: 7 days   AGE/SEX: 84 y.o. female  ROOM: [facility]     Subjective   Subjective   Examined at bedside. Not very talkative today. States legs still hurt       Objective   Objective   Vital Signs  Temp:  [98.1 °F (36.7 °C)-98.6 °F (37 °C)] 98.6 °F (37 °C)  Heart Rate:  [63-69] 66  Resp:  [18] 18  BP: (146-171)/(43-57) 153/45  SpO2:  [92 %-98 %] 98 %  on   ;   Device (Oxygen Therapy): room air  Body mass index is 45.27 kg/m².  Physical Exam  Constitutional:       General: She is not in acute distress.     Appearance: She is ill-appearing.   HENT:      Head: Normocephalic and atraumatic.   Cardiovascular:      Rate and Rhythm: Normal rate and regular rhythm.   Pulmonary:      Effort: Pulmonary effort is normal. No respiratory distress.   Abdominal:      General: There is no distension.      Palpations: Abdomen is soft.      Tenderness: There is no abdominal tenderness.   Musculoskeletal:      Comments: Tender to palpation on both lower extremities with erythema    Neurological:      Mental Status: She is oriented to person, place, and time.      Motor: Weakness present.     Exam reviewed and updated on 5/15/25    Results Review     I reviewed the patient's new clinical results.  Results from last 7 days   Lab Units 05/15/25  0349 25  033258 25  0951   WBC 10*3/mm3 12.92* 12.67* 13.13* 14.12*   HEMOGLOBIN g/dL 8.1* 8.1* 8.0* 8.4*   PLATELETS 10*3/mm3 310 305 303 308     Results from last 7 days   Lab Units 25  03325  0438 25  0951 25  0542   SODIUM mmol/L 142 139 138 143   POTASSIUM mmol/L 4.6 4.3 4.6 4.2   CHLORIDE mmol/L 110* 106 109* 110*   CO2 mmol/L 19.5* 18.5* 19.4* 17.2*   BUN mg/dL 54* 56* 58* 62*   CREATININE mg/dL 3.22* 3.25* 3.20* 3.24*   GLUCOSE mg/dL 127* 134* 158* 62*   EGFR mL/min/1.73 13.7* 13.5* 13.8* 13.6*     Results from last 7 days   Lab  Units 05/14/25  0331 05/13/25  0438 05/10/25  0314 05/09/25  0614   ALBUMIN g/dL 2.9* 3.0* 3.1* 3.2*     Results from last 7 days   Lab Units 05/14/25  0331 05/13/25  0438 05/12/25  0951 05/11/25  0542 05/10/25  0314 05/09/25  0614   CALCIUM mg/dL 8.9 8.5* 8.6 8.3* 8.1* 8.1*   ALBUMIN g/dL 2.9* 3.0*  --   --  3.1* 3.2*   MAGNESIUM mg/dL  --   --   --   --   --  1.6   PHOSPHORUS mg/dL 4.5 4.7*  --   --  5.5* 5.3*           Glucose   Date/Time Value Ref Range Status   05/15/2025 1124 126 70 - 130 mg/dL Final   05/15/2025 0726 99 70 - 130 mg/dL Final   05/14/2025 2058 160 (H) 70 - 130 mg/dL Final   05/14/2025 1630 186 (H) 70 - 130 mg/dL Final   05/14/2025 1152 134 (H) 70 - 130 mg/dL Final   05/14/2025 0807 143 (H) 70 - 130 mg/dL Final   05/13/2025 2047 144 (H) 70 - 130 mg/dL Final       No radiology results for the last day    I have personally reviewed all medications:  Scheduled Medications  allopurinol, 100 mg, Oral, Daily  atorvastatin, 80 mg, Oral, Nightly  bisoprolol, 10 mg, Oral, Daily  ferrous sulfate, 325 mg, Oral, Every Other Day  hydrALAZINE, 100 mg, Oral, Q8H  insulin glargine, 40 Units, Subcutaneous, Daily  insulin lispro, 2-9 Units, Subcutaneous, 4x Daily AC & at Bedtime  levothyroxine, 150 mcg, Oral, Q AM  pantoprazole, 40 mg, Oral, Q AM  pregabalin, 25 mg, Oral, BID  sodium bicarbonate, 650 mg, Oral, TID  sodium chloride, 10 mL, Intravenous, Q12H    Infusions   Diet  Diet: Cardiac, Renal; Healthy Heart (2-3 Na+); Low Sodium (2-3g), Low Phosphorus; Fluid Consistency: Thin (IDDSI 0)    I have personally reviewed:  [x]  Laboratory   [x]  Microbiology   [x]  Radiology   [x]  EKG/Telemetry  [x]  Cardiology/Vascular   []  Pathology    []  Records       Assessment/Plan     Active Hospital Problems    Diagnosis  POA    **Acute on chronic renal failure [N17.9, N18.9]  Yes    Diabetes [E11.9]  Unknown    PVD (peripheral vascular disease) with claudication [I73.9]  Unknown    Cellulitis [L03.90]  Unknown    Anemia  [D64.9]  Unknown    HTN (hypertension) [I10]  Yes    CKD (chronic kidney disease) stage 4, GFR 15-29 ml/min [N18.4]  Yes    Dependent edema [R60.9]  Yes    Acute kidney injury [N17.9]  Yes    Hypothyroidism (acquired) [E03.9]  Yes      Resolved Hospital Problems   No resolved problems to display.       84 y.o. female admitted with Acute on chronic renal failure.    1. Acute on CKD stage IV, baseline creatinine is around 2.6 and was noted to be 3.46 upon admission.  Home torsemide has been discontinued and nephrology initiated patient on Lasix 80 mg IV every 12 hours with which creatinine initially slightly improved to 3.33 and again started worsening therefore diuretics were discontinued.  Scr is now improving and nephrology is following.     2.  Diabetes mellitus, blood sugars are dropping into 50s to 60s therefore we will cut back Lantus from 100 units to 70 units.  Will further cut down to 40u for now- BG acceptable      3.  Hypertension, on bisoprolol, hydralazine.  Home dose Norvasc has been held secondary to lower extremity edema.    4.  Peripheral vascular disease with suspected ischemic rest pain, vascular consult has been obtained and arterial Doppler suggestive of severe disease.  Vascular surgery recommends CT angiogram with runoffs with possible intervention.  Vascular discussed above with son(Derrick) who is unsure how to proceed further given his mother stated no significant medical intervention in the past.  He would like to visit mother over the weekend to make further decisions.  Palliative care consult has been obtained as well for goals of care discussion.GOC transitioned to DNR. She is not interested in any interventions. Will likely be transitioned to hospice     5.  Bilateral lower respiratory cellulitis, infectious disease evaluated and felt cellulitis is less likely and IV Rocephin has been discontinued.    6.  Bilateral lower extremity edema, 2D echo during his hospital stay revealed normal  ejection fraction.    7.  Hypothyroidism, on Synthroid- dose increased due to elevated TSH    8.  Anemia, iron panel is suggestive of iron deficiency. S/p 3 doses of IV iron    9.  Hyperlipidemia, on statins.    10.  CODE STATUS is full code.    11.  Expected discharge date, 5/15/2025. Hospice consulted       Milind Cowan MD  [facility]  05/15/25  11:29 EDT

## 2025-05-15 NOTE — PLAN OF CARE
Goal Outcome Evaluation:  Plan of Care Reviewed With: patient           Outcome Evaluation: Pt required modA to sit EOB but declined to attempt to stand this date secondary to B LE pain and generalized weakness. Pt was able to maintain sitting balance with SBA and performed some LE exercises. Activity was limited secondary to pain and itching. PT will continue to follow to address strength, mobility, and gait.    Anticipated Discharge Disposition (PT): skilled nursing facility

## 2025-05-15 NOTE — THERAPY TREATMENT NOTE
Patient Name: Halie Guidry  : 1940    MRN: [id number]                              Today's Date: 5/15/2025       Admit Date: 2025    Visit Dx:     ICD-10-CM ICD-9-CM   1. Acute renal failure superimposed on chronic kidney disease, unspecified acute renal failure type, unspecified CKD stage  N17.9 584.9    N18.9 585.9   2. Peripheral edema  R60.0 782.3   3. Chronic anemia  D64.9 285.9     Patient Active Problem List   Diagnosis    Compression fracture    Lumbar degenerative disc disease    Type 2 diabetes mellitus with hyperglycemia, with long-term current use of insulin    Hyperlipidemia    Benign essential hypertension    Primary hypothyroidism    Neuropathy    Osteoporosis    Proteinuria    Tobacco abuse    Generalized weakness    Spinal stenosis    Scoliosis    Arthritis    VBI (vertebrobasilar insufficiency)    Orthostatic hypotension    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    Hypothyroidism (acquired)    Noncompliance with medication regimen    Vitamin D deficiency disease    Tremor    Seizure    Thoracic degenerative disc disease    Acute kidney injury    Hyperglycemia    Type 2 diabetes mellitus with hyperglycemia    Lower abdominal pain    Transaminitis    Emphysematous cystitis    Choledocholithiasis    Hypokalemia    Hypoxia    Generalized abdominal pain    History of Clostridium difficile infection    History of ERCP    Hypomagnesemia    Anxiety disorder    Neuropathic pain    Intertrigo    Weakness of both lower extremities    Accelerated hypertension    Acute cystitis without hematuria    Left lower lobe pneumonia    Cellulitis and abscess of left lower extremity    Benign hypertension with CKD (chronic kidney disease) stage IV    Peripheral edema    Primary malignant neuroendocrine tumor of pancreas    Encounter for long-term (current) use of high-risk medication    Diabetic foot ulcer    CKD (chronic kidney disease) stage 4, GFR 15-29 ml/min    Pressure injury of  buttock, stage 2    HTN (hypertension)    Anemia due to chronic kidney disease    Bilateral lower extremity edema    Cellulitis of right lower extremity    CKD (chronic kidney disease) stage 4, GFR 15-29 ml/min    Acute CHF    Bilateral cellulitis of lower leg    Acute UTI    UTI (urinary tract infection)    Acute UTI (urinary tract infection)    Metabolic acidosis    Cobalamin deficiency    Dependent edema    Gastroesophageal reflux disease    Mild neurocognitive disorder    Pancreatic neoplasm    Congestive heart failure    CHF exacerbation    Acute exacerbation of CHF (congestive heart failure)    Hypertensive urgency    Acute on chronic renal failure    Diabetes    PVD (peripheral vascular disease) with claudication    Cellulitis    Anemia     Past Medical History:   Diagnosis Date    Acute metabolic encephalopathy 06/24/2022    Anxiety     Arthritis     Benign essential hypertension 08/20/2014    Bleeding disorder     Depression     Diabetes     Diabetes mellitus, type 2     Disc degeneration, lumbar     Headache, tension-type     Hyperlipidemia     Hypothyroidism     Neuropathy     Osteoporosis 09/09/2015    Pancreatic mass     Sept 2023, on Monthly Octreotide Injection    Peripheral neuropathy     Rotator cuff tear, left     Scoliosis     Shoulder pain     LEFT, TORN ROTATOR CUFF S/P FALL    Spinal stenosis      Past Surgical History:   Procedure Laterality Date    APPENDECTOMY      BILATERAL BREAST REDUCTION Bilateral 08/2015    CATARACT EXTRACTION  03/2015    CHOLECYSTECTOMY WITH INTRAOPERATIVE CHOLANGIOGRAM N/A 3/27/2022    Procedure: CHOLECYSTECTOMY LAPAROSCOPIC INTRAOPERATIVE CHOLANGIOGRAM;  Surgeon: Aiyana Hill MD;  Location: [facility] MAIN OR;  Service: General;  Laterality: N/A;    COLONOSCOPY  06/05/2015    WNL    ERCP N/A 2/25/2022    Procedure: ENDOSCOPIC RETROGRADE CHOLANGIOPANCREATOGRAPHY with sphincterotomy and balloon sweep;  Surgeon: Chilo Wilhelm MD;  Location: [facility] ENDOSCOPY;   Service: Gastroenterology;  Laterality: N/A;  PRE/POST - CBD stones    ERCP N/A 3/28/2022    Procedure: ENDOSCOPIC RETROGRADE CHOLANGIOPANCREATOGRAPHY WITH SPHINCTEROTOMY AND BALLOON SWEEP;  Surgeon: Chilo Wilhelm MD;  Location: [facility] ENDOSCOPY;  Service: Gastroenterology;  Laterality: N/A;  PRE: COMMON DUCT STONE  POST: COMMON DUCT STONE    KYPHOPLASTY      REDUCTION MAMMAPLASTY      TONSILLECTOMY        General Information       Row Name 05/15/25 1358          Physical Therapy Time and Intention    Document Type therapy note (daily note)  -     Mode of Treatment individual therapy;physical therapy  -       Row Name 05/15/25 1358          General Information    Existing Precautions/Restrictions fall  -       Row Name 05/15/25 1358          Cognition    Orientation Status (Cognition) oriented x 3  -       Row Name 05/15/25 1353          Safety Issues/Impairments Affecting Functional Mobility    Impairments Affecting Function (Mobility) balance;endurance/activity tolerance;strength;pain  -               User Key  (r) = Recorded By, (t) = Taken By, (c) = Cosigned By      Initials Name Provider Type     Liliana Hays, PT Physical Therapist                   Mobility       Row Name 05/15/25 1355          Bed Mobility    Bed Mobility supine-sit;sit-supine  -     Supine-Sit Irion (Bed Mobility) verbal cues;nonverbal cues (demo/gesture);moderate assist (50% patient effort)  -     Sit-Supine Irion (Bed Mobility) verbal cues;nonverbal cues (demo/gesture);moderate assist (50% patient effort)  -     Assistive Device (Bed Mobility) head of bed elevated;bed rails  -     Comment, (Bed Mobility) pt required increased time to complete bed mobility tasks  -       Row Name 05/15/25 1353          Transfers    Comment, (Transfers) pt declined to attempt standing this date secondary to pain and generalized weakness  -               User Key  (r) = Recorded By, (t) = Taken By, (c) =  Cosigned By      Initials Name Provider Type    Liliana Earl, PT Physical Therapist                   Obj/Interventions       Row Name 05/15/25 1400          Motor Skills    Therapeutic Exercise --  5 reps B LE AP and LAQ  -               User Key  (r) = Recorded By, (t) = Taken By, (c) = Cosigned By      Initials Name Provider Type     Liliana Hays, PT Physical Therapist                   Goals/Plan    No documentation.                  Clinical Impression       Row Name 05/15/25 1400          Pain    Pre/Posttreatment Pain Comment Pt reports B LE hurt but did not provide a numerical value. pt reports she itches all over, RN notified that pt requesting medication to address itching  -[facility] Name 05/15/25 1400          Plan of Care Review    Plan of Care Reviewed With patient  -     Outcome Evaluation Pt required modA to sit EOB but declined to attempt to stand this date secondary to B LE pain and generalized weakness. Pt was able to maintain sitting balance with SBA and performed some LE exercises. Activity was limited secondary to pain and itching. PT will continue to follow to address strength, mobility, and gait.  -CH       Row Name 05/15/25 1400          Positioning and Restraints    Pre-Treatment Position in bed  -     Post Treatment Position bed  -     In Bed supine;notified nsg;call light within reach;encouraged to call for assist;exit alarm on  -               User Key  (r) = Recorded By, (t) = Taken By, (c) = Cosigned By      Initials Name Provider Type    Liliana Earl, PT Physical Therapist                   Outcome Measures       Row Name 05/15/25 1403          How much help from another person do you currently need...    Turning from your back to your side while in flat bed without using bedrails? 2  -CH     Moving from lying on back to sitting on the side of a flat bed without bedrails? 2  -CH     Moving to and from a bed to a chair (including a wheelchair)? 1   -CH     Standing up from a chair using your arms (e.g., wheelchair, bedside chair)? 1  -CH     Climbing 3-5 steps with a railing? 1  -CH     To walk in hospital room? 1  -CH     AM-PAC 6 Clicks Score (PT) 8  -CH     Highest Level of Mobility Goal Sit at Edge of Bed-3  -       Row Name 05/15/25 1403          Functional Assessment    Outcome Measure Options AM-PAC 6 Clicks Basic Mobility (PT)  -               User Key  (r) = Recorded By, (t) = Taken By, (c) = Cosigned By      Initials Name Provider Type     Liliana Hays PT Physical Therapist                                 Physical Therapy Education       Title: PT OT SLP Therapies (Done)       Topic: Physical Therapy (Done)       Point: Mobility training (Done)       Learning Progress Summary            Patient Acceptance, E,TB,D, VU,NR by  at 5/15/2025 1403    Acceptance, E, VU,NR by AB at 5/13/2025 1658    Acceptance, E, VU,NR by AB at 5/12/2025 1834    Acceptance, E,TB,D, VU,NR by  at 5/9/2025 1404                      Point: Home exercise program (Done)       Learning Progress Summary            Patient Acceptance, E,TB,D, VU,NR by  at 5/15/2025 1403    Acceptance, E, VU,NR by AB at 5/13/2025 1658    Acceptance, E, VU,NR by AB at 5/12/2025 1834                      Point: Body mechanics (Done)       Learning Progress Summary            Patient Acceptance, E,TB,D, VU,NR by  at 5/15/2025 1403    Acceptance, E, VU,NR by AB at 5/13/2025 1658    Acceptance, E, VU,NR by AB at 5/12/2025 1834    Acceptance, E,TB,D, VU,NR by  at 5/9/2025 1404                      Point: Precautions (Done)       Learning Progress Summary            Patient Acceptance, E,TB,D, VU,NR by  at 5/15/2025 1403    Acceptance, E, VU,NR by AB at 5/13/2025 1658    Acceptance, E, VU,NR by AB at 5/12/2025 1834    Acceptance, E,TB,D, VU,NR by  at 5/9/2025 1404                                      User Key       Initials Effective Dates Name Provider Type Discipline    CH  06/16/21 -  Liliana Hays PT Physical Therapist PT    AB 12/15/23 -  Jo Ann Williamson, RN Registered Nurse Nurse                  PT Recommendation and Plan  Planned Therapy Interventions (PT): balance training, bed mobility training, gait training, home exercise program, patient/family education, strengthening, transfer training  Outcome Evaluation: Pt required modA to sit EOB but declined to attempt to stand this date secondary to B LE pain and generalized weakness. Pt was able to maintain sitting balance with SBA and performed some LE exercises. Activity was limited secondary to pain and itching. PT will continue to follow to address strength, mobility, and gait.     Time Calculation:         PT Charges       Row Name 05/15/25 1403             Time Calculation    Start Time 1056  -      Stop Time 1110  -CH      Time Calculation (min) 14 min  -CH      PT Received On 05/15/25  -      PT - Next Appointment 05/16/25  -         Time Calculation- PT    Total Timed Code Minutes- PT 14 minute(s)  -CH         Timed Charges    50178 - PT Therapeutic Activity Minutes 14  -CH         Total Minutes    Timed Charges Total Minutes 14  -CH       Total Minutes 14  -CH                User Key  (r) = Recorded By, (t) = Taken By, (c) = Cosigned By      Initials Name Provider Type     Liliana Hays, PT Physical Therapist                  Therapy Charges for Today       Code Description Service Date Service Provider Modifiers Qty    [id number]  PT THERAPEUTIC ACT EA 15 MIN 5/15/2025 Liliana Hays PT GP 1            PT G-Codes  Outcome Measure Options: AM-PAC 6 Clicks Basic Mobility (PT)  AM-PAC 6 Clicks Score (PT): 8  PT Discharge Summary  Anticipated Discharge Disposition (PT): skilled nursing facility    Liliana Hays PT  5/15/2025

## 2025-05-15 NOTE — PLAN OF CARE
Problem: Adult Inpatient Plan of Care  Goal: Plan of Care Review  5/15/2025 0506 by Que Willard, RN  Outcome: Progressing  Flowsheets (Taken 5/15/2025 0506)  Progress: no change  Outcome Evaluation: Vital stable, pain in BLE controlled with PO norco, contact isolation remained in place for VRE, hospice will follow up with pt and her son today, q2hr turn, dressing in left calf C/D/I, dressing change due 05/16/25, potential discharge today, last BM on 05/13/25, Miralax given, Melatonin given for sleep, plan of care on going.  Plan of Care Reviewed With: patient  5/15/2025 0210 by Que Willard, RN  Outcome: Progressing  Flowsheets (Taken 5/15/2025 0210)  Progress: no change  Plan of Care Reviewed With: patient

## 2025-05-15 NOTE — PROGRESS NOTES
[facility] Progress Note      Patient Name: Halie Guidry  : 1940  MRN: [id number]  Primary Care Physician:  Rossy Laird APRN  Date of admission: 2025    Subjective     Interval History:   Follow-up VIPUL on CKD 4.  Uncomfortable.  Still having significant leg pain.  Eating.  Hospice unable to reach her son yesterday and she was unable to participate in the conversation appropriately.    Review of Systems:   As noted above    Objective     Vitals:   Temp:  [98.1 °F (36.7 °C)-98.6 °F (37 °C)] 98.6 °F (37 °C)  Heart Rate:  [63-69] 66  Resp:  [18] 18  BP: (146-171)/(43-57) 153/45    Intake/Output Summary (Last 24 hours) at 5/15/2025 0830  Last data filed at 5/15/2025 0727  Gross per 24 hour   Intake 100 ml   Output 900 ml   Net -800 ml       Physical Exam:    General Appearance: Trying to reposition in bed.  Mild distress due to discomfort in her legs.  Skin: warm and dry  HEENT: oral mucosa dry, nonicteric sclera  Neck: supple, no JVD  Lungs: Clear to auscultation bilaterally.  Unlabored on room air  Heart: RRR, normal S1 and S2  Abdomen: soft, nontender, active bowel sounds, nondistended  : no palpable bladder  Extremities:2+ lower ext edema .  Dressing in place.  Neuro: normal speech and mental status     Scheduled Meds:     allopurinol, 100 mg, Oral, Daily  [Held by provider] amLODIPine, 5 mg, Oral, Q24H  atorvastatin, 80 mg, Oral, Nightly  bisoprolol, 10 mg, Oral, Daily  ferrous sulfate, 325 mg, Oral, Every Other Day  [Held by provider] furosemide, 80 mg, Intravenous, BID Diuretics  hydrALAZINE, 100 mg, Oral, Q8H  insulin glargine, 40 Units, Subcutaneous, Daily  insulin lispro, 2-9 Units, Subcutaneous, 4x Daily AC & at Bedtime  levothyroxine, 150 mcg, Oral, Q AM  pantoprazole, 40 mg, Oral, Q AM  pregabalin, 25 mg, Oral, BID  sodium bicarbonate, 650 mg, Oral, TID  sodium chloride, 10 mL, Intravenous, Q12H      IV Meds:        Results Reviewed:   I have  personally reviewed the results from the time of this admission to 5/15/2025 08:30 EDT     Results from last 7 days   Lab Units 05/14/25  0331 05/13/25 0438 05/12/25  0951   SODIUM mmol/L 142 139 138   POTASSIUM mmol/L 4.6 4.3 4.6   CHLORIDE mmol/L 110* 106 109*   CO2 mmol/L 19.5* 18.5* 19.4*   BUN mg/dL 54* 56* 58*   CREATININE mg/dL 3.22* 3.25* 3.20*   CALCIUM mg/dL 8.9 8.5* 8.6   GLUCOSE mg/dL 127* 134* 158*       Estimated Creatinine Clearance: 19 mL/min (A) (by C-G formula based on SCr of 3.22 mg/dL (H)).    Results from last 7 days   Lab Units 05/14/25  0331 05/13/25 0438 05/10/25  0314 05/09/25  0614   MAGNESIUM mg/dL  --   --   --  1.6   PHOSPHORUS mg/dL 4.5 4.7* 5.5* 5.3*       Results from last 7 days   Lab Units 05/09/25  0614   URIC ACID mg/dL 12.0*       Results from last 7 days   Lab Units 05/15/25  0349 05/14/25 0331 05/13/25 0438 05/12/25  0951 05/11/25  0542   WBC 10*3/mm3 12.92* 12.67* 13.13* 14.12* 13.23*   HEMOGLOBIN g/dL 8.1* 8.1* 8.0* 8.4* 8.6*   PLATELETS 10*3/mm3 310 305 303 308 308             Assessment / Plan     ASSESSMENT:  Acute kidney injury on CKD 4 baseline creatinine 2.3-2.6.  Peak creatinine 3.65 925.  Creatinine had plateaued at 3.2.  Anion gap metabolic acidosis due to renal failure. No intervention planned.  No dialysis per her wishes ( no acute indication).  Did not draw chemistries today.  No plan for intervention.  Hypertension with CKD. Increased bisoprolol 5/13/2025.  Overall better.  Diabetes mellitus type 2 with CKD  Lower extremity wound with ischemic rest pain.  Significant peripheral arterial disease.  Goals of care noted and palliative care consult.  No further procedures planned.  5.  Anemia of chronic kidney disease.  6.  Hypothyroid on replacement. TSH 15. Increase replacement .  PLAN:  Nothing to add today from a renal standpoint.  2.  Hospice has been consulted.  Trying to get in touch with her son.  Thank you for involving us in the care of Halie JANIE  Brea.  Please feel free to call with any questions.    Mary Rice MD  05/15/25  08:30 EDT    [facility]  [phone]    Please note that portions of this note were completed with a voice recognition program.

## 2025-05-15 NOTE — PLAN OF CARE
Goal Outcome Evaluation:  Plan of Care Reviewed With: patient        Progress: improving  Outcome Evaluation: Pain better controlled at this point per pt through PRN meds. Weight shifted as tolerated. BLE elevated in pillows. Heels protected in meplex. Dsging on RLE CDI. VSS, BP elevated but scheduled med given, will follow up. All needs met.

## 2025-05-16 VITALS
WEIGHT: 293 LBS | OXYGEN SATURATION: 96 % | DIASTOLIC BLOOD PRESSURE: 46 MMHG | RESPIRATION RATE: 18 BRPM | SYSTOLIC BLOOD PRESSURE: 158 MMHG | HEIGHT: 68 IN | HEART RATE: 66 BPM | BODY MASS INDEX: 44.41 KG/M2 | TEMPERATURE: 98.1 F

## 2025-05-16 LAB
ANION GAP SERPL CALCULATED.3IONS-SCNC: 13.3 MMOL/L (ref 5–15)
BUN SERPL-MCNC: 57 MG/DL (ref 8–23)
BUN/CREAT SERPL: 16.3 (ref 7–25)
CALCIUM SPEC-SCNC: 8.8 MG/DL (ref 8.6–10.5)
CHLORIDE SERPL-SCNC: 109 MMOL/L (ref 98–107)
CO2 SERPL-SCNC: 19.7 MMOL/L (ref 22–29)
CREAT SERPL-MCNC: 3.5 MG/DL (ref 0.57–1)
DEPRECATED RDW RBC AUTO: 47.3 FL (ref 37–54)
EGFRCR SERPLBLD CKD-EPI 2021: 12.4 ML/MIN/1.73
ERYTHROCYTE [DISTWIDTH] IN BLOOD BY AUTOMATED COUNT: 15.2 % (ref 12.3–15.4)
GLUCOSE BLDC GLUCOMTR-MCNC: 121 MG/DL (ref 70–130)
GLUCOSE BLDC GLUCOMTR-MCNC: 136 MG/DL (ref 70–130)
GLUCOSE BLDC GLUCOMTR-MCNC: 215 MG/DL (ref 70–130)
GLUCOSE SERPL-MCNC: 131 MG/DL (ref 65–99)
HCT VFR BLD AUTO: 27.1 % (ref 34–46.6)
HGB BLD-MCNC: 8.6 G/DL (ref 12–15.9)
MCH RBC QN AUTO: 27.3 PG (ref 26.6–33)
MCHC RBC AUTO-ENTMCNC: 31.7 G/DL (ref 31.5–35.7)
MCV RBC AUTO: 86 FL (ref 79–97)
PLATELET # BLD AUTO: 322 10*3/MM3 (ref 140–450)
PMV BLD AUTO: 9.8 FL (ref 6–12)
POTASSIUM SERPL-SCNC: 4.6 MMOL/L (ref 3.5–5.2)
RBC # BLD AUTO: 3.15 10*6/MM3 (ref 3.77–5.28)
SODIUM SERPL-SCNC: 142 MMOL/L (ref 136–145)
WBC NRBC COR # BLD AUTO: 11.52 10*3/MM3 (ref 3.4–10.8)

## 2025-05-16 PROCEDURE — 85027 COMPLETE CBC AUTOMATED: CPT | Performed by: STUDENT IN AN ORGANIZED HEALTH CARE EDUCATION/TRAINING PROGRAM

## 2025-05-16 PROCEDURE — 82948 REAGENT STRIP/BLOOD GLUCOSE: CPT

## 2025-05-16 PROCEDURE — 25010000002 HYDROMORPHONE PER 4 MG: Performed by: INTERNAL MEDICINE

## 2025-05-16 PROCEDURE — 80048 BASIC METABOLIC PNL TOTAL CA: CPT | Performed by: STUDENT IN AN ORGANIZED HEALTH CARE EDUCATION/TRAINING PROGRAM

## 2025-05-16 PROCEDURE — 63710000001 INSULIN GLARGINE PER 5 UNITS: Performed by: STUDENT IN AN ORGANIZED HEALTH CARE EDUCATION/TRAINING PROGRAM

## 2025-05-16 PROCEDURE — 63710000001 INSULIN LISPRO (HUMAN) PER 5 UNITS: Performed by: INTERNAL MEDICINE

## 2025-05-16 RX ORDER — HYDROXYZINE HYDROCHLORIDE 25 MG/1
25 TABLET, FILM COATED ORAL 3 TIMES DAILY PRN
Qty: 30 TABLET | Refills: 0 | Status: ON HOLD | OUTPATIENT
Start: 2025-05-16

## 2025-05-16 RX ORDER — HYDROCODONE BITARTRATE AND ACETAMINOPHEN 10; 325 MG/1; MG/1
1 TABLET ORAL EVERY 6 HOURS PRN
Qty: 15 TABLET | Refills: 0 | Status: ON HOLD | OUTPATIENT
Start: 2025-05-16

## 2025-05-16 RX ORDER — PREGABALIN 25 MG/1
25 CAPSULE ORAL 2 TIMES DAILY
Qty: 12 CAPSULE | Refills: 0 | Status: ON HOLD | OUTPATIENT
Start: 2025-05-16

## 2025-05-16 RX ORDER — SODIUM BICARBONATE 650 MG/1
650 TABLET ORAL 3 TIMES DAILY
Qty: 30 TABLET | Refills: 0 | Status: ON HOLD | OUTPATIENT
Start: 2025-05-16

## 2025-05-16 RX ORDER — ALLOPURINOL 100 MG/1
100 TABLET ORAL DAILY
Qty: 30 TABLET | Refills: 0 | Status: ON HOLD | OUTPATIENT
Start: 2025-05-17

## 2025-05-16 RX ORDER — HYDRALAZINE HYDROCHLORIDE 100 MG/1
100 TABLET, FILM COATED ORAL EVERY 8 HOURS SCHEDULED
Qty: 90 TABLET | Refills: 0 | Status: ON HOLD | OUTPATIENT
Start: 2025-05-16

## 2025-05-16 RX ORDER — LEVOTHYROXINE SODIUM 150 UG/1
150 TABLET ORAL
Qty: 30 TABLET | Refills: 0 | Status: ON HOLD | OUTPATIENT
Start: 2025-05-17

## 2025-05-16 RX ORDER — BISOPROLOL FUMARATE 10 MG/1
10 TABLET, FILM COATED ORAL DAILY
Qty: 30 TABLET | Refills: 0 | Status: ON HOLD | OUTPATIENT
Start: 2025-05-17

## 2025-05-16 RX ADMIN — PANTOPRAZOLE SODIUM 40 MG: 40 TABLET, DELAYED RELEASE ORAL at 05:46

## 2025-05-16 RX ADMIN — Medication 10 ML: at 09:48

## 2025-05-16 RX ADMIN — INSULIN LISPRO 4 UNITS: 100 INJECTION, SOLUTION INTRAVENOUS; SUBCUTANEOUS at 12:46

## 2025-05-16 RX ADMIN — HYDROMORPHONE HYDROCHLORIDE 0.5 MG: 1 INJECTION, SOLUTION INTRAMUSCULAR; INTRAVENOUS; SUBCUTANEOUS at 00:40

## 2025-05-16 RX ADMIN — HYDRALAZINE HYDROCHLORIDE 100 MG: 50 TABLET ORAL at 15:29

## 2025-05-16 RX ADMIN — HYDROXYZINE HYDROCHLORIDE 25 MG: 25 TABLET, FILM COATED ORAL at 15:30

## 2025-05-16 RX ADMIN — HYDROMORPHONE HYDROCHLORIDE 0.5 MG: 1 INJECTION, SOLUTION INTRAMUSCULAR; INTRAVENOUS; SUBCUTANEOUS at 04:49

## 2025-05-16 RX ADMIN — INSULIN GLARGINE 40 UNITS: 100 INJECTION, SOLUTION SUBCUTANEOUS at 09:48

## 2025-05-16 RX ADMIN — PREGABALIN 25 MG: 25 CAPSULE ORAL at 09:48

## 2025-05-16 RX ADMIN — HYDRALAZINE HYDROCHLORIDE 100 MG: 50 TABLET ORAL at 09:48

## 2025-05-16 RX ADMIN — ALLOPURINOL 100 MG: 100 TABLET ORAL at 09:48

## 2025-05-16 RX ADMIN — POLYETHYLENE GLYCOL 3350 17 G: 17 POWDER, FOR SOLUTION ORAL at 12:47

## 2025-05-16 RX ADMIN — HYDROXYZINE HYDROCHLORIDE 25 MG: 25 TABLET, FILM COATED ORAL at 05:48

## 2025-05-16 RX ADMIN — BISOPROLOL FUMARATE 10 MG: 5 TABLET ORAL at 09:48

## 2025-05-16 RX ADMIN — HYDROCODONE BITARTRATE AND ACETAMINOPHEN 1 TABLET: 7.5; 325 TABLET ORAL at 15:30

## 2025-05-16 RX ADMIN — SODIUM BICARBONATE 650 MG: 650 TABLET ORAL at 09:48

## 2025-05-16 RX ADMIN — HYDROCODONE BITARTRATE AND ACETAMINOPHEN 1 TABLET: 7.5; 325 TABLET ORAL at 09:48

## 2025-05-16 RX ADMIN — LEVOTHYROXINE SODIUM 150 MCG: 0.07 TABLET ORAL at 05:46

## 2025-05-16 RX ADMIN — SODIUM BICARBONATE 650 MG: 650 TABLET ORAL at 15:30

## 2025-05-16 NOTE — CASE MANAGEMENT/SOCIAL WORK
"Continued Stay Note  [facility]     Patient Name: Halie Guidry  MRN: [id number]  Today's Date: 5/16/2025    Admit Date: 5/7/2025    Plan: [facility] with [facility] to follow up with pt and son. Transport via  [facility]   Discharge Plan       Row Name 05/16/25 1400       Plan    Plan [facility] with [facility] to follow up with pt and son. Transport [facility]    Patient/Family in Agreement with Plan yes    Plan Comments Received chat from Rubi/Golden stating \"apparently hospice is still working to get in touch with the family so she can discharge with hospice services,\"  Called pt's son Giovani discussed that he really needs to have a meeting with Hospice and his mom. Derrick stated he did not feel it is necessary because that is for people who are going to die in a couple of days and his mom is not. Discussed the role of Hospice and explained that this CCP has had family members in Hospice for months in the past. Son given number for Hospice to call and setup meeting. Called [facility] and notified that I had spoken to son and that Hospice number given for him to call and setup meeting. Called and spoke to [facility] to see if they would go ahead and take her back and have Hospice follow up with her there. Stated they would take her back and is good to return today. Was told her Pharmacy is [facility]. Pharmacy updated. Notified [facility] and son of plan for D/C today. Obtained EMS DNR  and placed in Packet.  Tobias Hicks RN-BC                   Discharge Codes    No documentation.                 Expected Discharge Date and Time       Expected Discharge Date Expected Discharge Time    May 16, 2025               Tobias Hicks RN    "

## 2025-05-16 NOTE — PLAN OF CARE
Goal Outcome Evaluation:  Plan of Care Reviewed With: patient           Outcome Evaluation: BP elevated. Constant pain on BLE. C/o itchiness resulting to scabs/rash on her back. Prn med given.Anxious at times.

## 2025-05-16 NOTE — CASE MANAGEMENT/SOCIAL WORK
Case Management Discharge Note      Final Note: [facility] with [facility] to follow up with pt and son. Transport [facility]. Notified sonDerrick of D/C    Provided Post Acute Provider List?: Yes  Post Acute Provider List: Nursing Home  Delivered To: Patient  Method of Delivery: In person    Selected Continued Care - Admitted Since 5/7/2025       Destination    No services have been selected for the patient.                Durable Medical Equipment    No services have been selected for the patient.                Dialysis/Infusion    No services have been selected for the patient.                Home Medical Care Coordination complete.      Service Provider Services Address Phone Fax Patient Preferred    [facility] [address] [address] [phone] --              Therapy    No services have been selected for the patient.                Community Resources    No services have been selected for the patient.                Community & DME    No services have been selected for the patient.                    Selected Continued Care - Episodes Includes continued care and service providers with selected services from the active episodes listed below          Selected Continued Care - Prior Encounters Includes continued care and service providers with selected services from prior encounters from 2/6/2025 to 5/16/2025      Discharged on 2/21/2025 Admission date: 2/17/2025 - Discharge disposition: Skilled Nursing Facility (DC - External)      Destination       Service Provider Services Address Phone Fax Patient Preferred    [facility] Nursing [address] [address] [phone] --                          Transportation Services  Ambulance: [facility]    Final Discharge Disposition Code: 01 - home or self-care

## 2025-05-16 NOTE — DISCHARGE SUMMARY
Patient Name: Halie Guidry  : 1940  MRN: [id number]    Date of Admission: 2025  Date of Discharge:  2025  Primary Care Physician: Rossy Laird APRN      Chief Complaint:   Leg Swelling      Discharge Diagnoses     Active Hospital Problems    Diagnosis  POA    **Acute on chronic renal failure [N17.9, N18.9]  Yes    Diabetes [E11.9]  Unknown    PVD (peripheral vascular disease) with claudication [I73.9]  Unknown    Cellulitis [L03.90]  Unknown    Anemia [D64.9]  Unknown    HTN (hypertension) [I10]  Yes    CKD (chronic kidney disease) stage 4, GFR 15-29 ml/min [N18.4]  Yes    Dependent edema [R60.9]  Yes    Acute kidney injury [N17.9]  Yes    Hypothyroidism (acquired) [E03.9]  Yes      Resolved Hospital Problems   No resolved problems to display.        Hospital Course     This is an 84 [place] with a past medical history of type 2 diabetes, hypertension, peripheral vascular disease, CKD stage IV who was admitted to hospital for lower extremity pain and swelling.  He underwent an arterial Doppler that showed severely reduced ABIs on the right and left lower extremity.    Vascular surgery saw the patient in consultation and was that without revascularization she would require bilateral above-knee amputations.  The plan was initially to undergo CT angiogram however there was hesitation due to the patient's advanced chronic kidney disease as well as her wishes of letting nature take its course.  Palliative care was consulted.  Goals of care were transition and DO NOT RESUSCITATE, limited intervention.  Hospice had attempted to get in touch with the patient's son however this could not be accomplished.  At this point in time there are no surgical interventions that will be taken and the patient is going to be discharged back to her assisted living facility where a conversation can be had with hospice, the patient, and the patient's son.        Physical Exam:  Temp:  [97.9 °F (36.6 °C)-98.4  °F (36.9 °C)] 97.9 °F (36.6 °C)  Heart Rate:  [65-67] 67  Resp:  [18] 18  BP: (171-186)/(56-71) 186/59  Body mass index is 44.6 kg/m².  Physical Exam  Constitutional:       General: She is not in acute distress.     Appearance: She is ill-appearing.   HENT:      Head: Normocephalic and atraumatic.   Cardiovascular:      Rate and Rhythm: Normal rate and regular rhythm.   Pulmonary:      Effort: Pulmonary effort is normal. No respiratory distress.   Abdominal:      General: There is no distension.      Palpations: Abdomen is soft.      Tenderness: There is no abdominal tenderness.   Skin:     Comments: BLE are tender to palpation    Neurological:      Mental Status: She is alert.         Consultants     Consult Orders (all) (From admission, onward)       Start     Ordered    05/12/25 1454  Inpatient Hospice Consult  Once        Comments: Please call patient sonDerrick to arrange meeting   Specialty:  Hospice and Palliative Medicine  Provider:  (Not yet assigned)    05/12/25 1454    05/09/25 1633  Inpatient Palliative Care Team Consult  Once        Provider:  (Not yet assigned)    05/09/25 1632    05/08/25 1528  Inpatient Infectious Diseases Consult  Once        Specialty:  Infectious Diseases  Provider:  Lei Rodriguez MD    05/08/25 1528    05/07/25 2251  Inpatient Vascular Surgery Consult  Once        Specialty:  Vascular Surgery  Provider:  Joyce Shaffer MD    05/07/25 2251    05/07/25 1748  Nephrology (on -call MD unless specified)  STAT        Specialty:  Nephrology  Provider:  (Not yet assigned)    05/07/25 1748                  Procedures     Imaging Results (All)       Procedure Component Value Units Date/Time    US Renal Bilateral [[id number]] Collected: 05/09/25 1926     Updated: 05/09/25 1931    Narrative:      US RENAL BILATERAL-     Clinical: Acute renal insufficiency     COMPARISON 4/30/2024     FINDINGS: The right kidney measures 10.1 cm in length and the left  kidney measures 10.8  cm in length. Renal cortical echotexture is greater  than anticipated consistent with medical renal disease. No  hydronephrosis on the right or left. Bilateral renal cysts again  demonstrated with the largest on the right measuring 2.3 cm in maximum  diameter and the largest on the left measuring 3.8 cm in maximum  diameter. No renal calculus. Limited images of the bladder fail to  demonstrate either ureteral jet. Small amount of suspended debris is  suggested within the bladder lumen.     CONCLUSION: Renal cortical echotexture consistent with medical renal  disease, no hydronephrosis. Bilateral renal cysts. Neither ureteral jet  could be documented.     This report was finalized on 5/9/2025 7:28 PM by Dr. Eduardo Lopez M.D  on Workstation: [id number]       XR Chest PA & Lateral [[id number]] Collected: 05/07/25 2127     Updated: 05/08/25 0733    Narrative:      XR CHEST PA AND LATERAL-     HISTORY: 84-year-old female with dyspnea. Lower extremity swelling.     FINDINGS: Compared with 11/05/2024 CXR. There is greater pulmonary  vascular congestion than previously. There may be minimal bilateral  pleural effusions. No focal airspace consolidations are seen to suggest  pneumonia.     This report was finalized on 5/8/2025 7:30 AM by Dr. Diane Munoz M.D on  Workstation: [id number]               Pertinent Labs     Results from last 7 days   Lab Units 05/16/25  0546 05/15/25  0349 05/14/25  0331 05/13/25  0438   WBC 10*3/mm3 11.52* 12.92* 12.67* 13.13*   HEMOGLOBIN g/dL 8.6* 8.1* 8.1* 8.0*   PLATELETS 10*3/mm3 322 310 305 303     Results from last 7 days   Lab Units 05/16/25  0546 05/14/25  0331 05/13/25  0438 05/12/25  0951   SODIUM mmol/L 142 142 139 138   POTASSIUM mmol/L 4.6 4.6 4.3 4.6   CHLORIDE mmol/L 109* 110* 106 109*   CO2 mmol/L 19.7* 19.5* 18.5* 19.4*   BUN mg/dL 57* 54* 56* 58*   CREATININE mg/dL 3.50* 3.22* 3.25* 3.20*   GLUCOSE mg/dL 131* 127* 134* 158*   Estimated Creatinine Clearance: 17.3 mL/min (A)  "(by C-G formula based on SCr of 3.5 mg/dL (H)).  Results from last 7 days   Lab Units 05/14/25  0331 05/13/25 0438 05/10/25  0314   ALBUMIN g/dL 2.9* 3.0* 3.1*     Results from last 7 days   Lab Units 05/16/25  0546 05/14/25  0331 05/13/25  0438 05/12/25  0951 05/11/25  0542 05/10/25  0314   CALCIUM mg/dL 8.8 8.9 8.5* 8.6   < > 8.1*   ALBUMIN g/dL  --  2.9* 3.0*  --   --  3.1*   PHOSPHORUS mg/dL  --  4.5 4.7*  --   --  5.5*    < > = values in this interval not displayed.               Invalid input(s): \"LDLCALC\"        Test Results Pending at Discharge       Discharge Details        Discharge Medications        New Medications        Instructions Start Date   allopurinol 100 MG tablet  Commonly known as: ZYLOPRIM   100 mg, Oral, Daily   Start Date: May 17, 2025     HYDROcodone-acetaminophen  MG per tablet  Commonly known as: NORCO   1 tablet, Oral, Every 6 Hours PRN      hydrOXYzine 25 MG tablet  Commonly known as: ATARAX   25 mg, Oral, 3 Times Daily PRN      insulin glargine 100 UNIT/ML injection  Commonly known as: LANTUS, SEMGLEE   40 Units, Subcutaneous, Daily   Start Date: May 17, 2025     sodium bicarbonate 650 MG tablet   650 mg, Oral, 3 Times Daily             Changes to Medications        Instructions Start Date   bisoprolol 10 MG tablet  Commonly known as: ZEBeta  What changed:   medication strength  how much to take   10 mg, Oral, Daily   Start Date: May 17, 2025     levothyroxine 150 MCG tablet  Commonly known as: SYNTHROID, LEVOTHROID  What changed:   medication strength  how much to take   150 mcg, Oral, Every Early Morning   Start Date: May 17, 2025     pregabalin 25 MG capsule  Commonly known as: LYRICA  What changed:   medication strength  how much to take   25 mg, Oral, 2 Times Daily             Continue These Medications        Instructions Start Date   atorvastatin 80 MG tablet  Commonly known as: LIPITOR   80 mg, Oral, Nightly      BD Pen Needle Naila 2nd Gen 32G X 4 MM misc  Generic drug: " Insulin Pen Needle   Use 1 pen needle 4 (Four) Times a Day.      DULoxetine 30 MG capsule  Commonly known as: CYMBALTA   30 mg, Oral, Daily      ferrous sulfate 325 (65 FE) MG tablet   325 mg, Oral, Every Other Day      fluticasone 50 MCG/ACT nasal spray  Commonly known as: FLONASE   2 sprays, Nasal, Daily      hydrALAZINE 50 MG tablet  Commonly known as: APRESOLINE   50 mg, Oral, Every 8 Hours Scheduled      lansoprazole 30 MG capsule  Commonly known as: PREVACID   30 mg, Oral, Daily             Stop These Medications      amLODIPine 5 MG tablet  Commonly known as: NORVASC     torsemide 20 MG tablet  Commonly known as: DEMADEX     Toujeo Max SoloStar 300 UNIT/ML solution pen-injector injection  Generic drug: Insulin Glargine (2 Unit Dial)              Allergies   Allergen Reactions    Sulfa Antibiotics Unknown - High Severity     Pt says it was a long time ago and I don't remember but it wasn't good.          Discharge Disposition:  Skilled Nursing Facility (DC - External)    Discharge Diet:  Diet Order   Procedures    Diet: Cardiac, Renal; Healthy Heart (2-3 Na+); Low Sodium (2-3g), Low Phosphorus; Fluid Consistency: Thin (IDDSI 0)       Discharge Activity: as tolerated       CODE STATUS:    Code Status and Medical Interventions: No CPR (Do Not Attempt to Resuscitate); Limited Support; No intubation (DNI), No dialysis   Ordered at: 05/12/25 1144     Code Status (Patient has no pulse and is not breathing):    No CPR (Do Not Attempt to Resuscitate)     Medical Interventions (Patient has pulse or is breathing):    Limited Support     Medical Intervention Limits:    No intubation (DNI)       No dialysis     Level Of Support Discussed With:    Patient       Future Appointments   Date Time Provider Department Center   5/20/2025 11:00 AM NAY US NIVAS ART/VASC CART 1  NAY NI VA NAY   5/20/2025 12:00 PM NAY OP VAS RM 2  NAY OVKR NAY   5/20/2025 12:45 PM Ke Kilgore MD [facility]K VS NAY NAY      Contact information for  follow-up providers       Rossy Laird APRN .    Specialty: Family Medicine  Contact information:  [address] [place] [address]  [phone]                       Contact information for after-discharge care       Home Medical Care       [facility] .    Service: Home Health Services  Contact information:  [address], [address]  [facility] [address] [phone]                                   Time Spent on Discharge:  Greater than 30 minutes      Milind Cowan MD  [facility]  05/16/25  12:17 EDT

## 2025-05-16 NOTE — PROGRESS NOTES
[facility] Progress Note      Patient Name: Halie Guidry  : 1940  MRN: [id number]  Primary Care Physician:  Rossy Laird APRN  Date of admission: 2025    Subjective     Interval History:   Follow-up [facility] on CKD 4.  Uncomfortable.  Continue complains of pain  Plan noted to discharge at this [place] with hospice to follow-up  Review of Systems:   As noted above    Objective     Vitals:   Temp:  [97.9 °F (36.6 °C)-98.4 °F (36.9 °C)] 98.1 °F (36.7 °C)  Heart Rate:  [65-67] 66  Resp:  [18] 18  BP: (158-186)/(46-62) 158/46    Intake/Output Summary (Last 24 hours) at 2025 1421  Last data filed at 2025 1334  Gross per 24 hour   Intake 780 ml   Output 400 ml   Net 380 ml       Physical Exam:    General Appearance: Trying to reposition in bed.  Mild distress due to discomfort in her legs.  Skin: warm and dry  HEENT: oral mucosa dry, nonicteric sclera  Neck: supple, no JVD  Lungs: Clear to auscultation bilaterally.  Unlabored on room air  Heart: RRR, normal S1 and S2  Abdomen: soft, nontender, active bowel sounds, nondistended  : no palpable bladder  Extremities:1+ lower ext edema .  Dressing in place.  Neuro: normal speech and mental status     Scheduled Meds:     allopurinol, 100 mg, Oral, Daily  atorvastatin, 80 mg, Oral, Nightly  bisoprolol, 10 mg, Oral, Daily  ferrous sulfate, 325 mg, Oral, Every Other Day  hydrALAZINE, 100 mg, Oral, Q8H  insulin glargine, 40 Units, Subcutaneous, Daily  insulin lispro, 2-9 Units, Subcutaneous, 4x Daily AC & at Bedtime  levothyroxine, 150 mcg, Oral, Q AM  pantoprazole, 40 mg, Oral, Q AM  pregabalin, 25 mg, Oral, BID  sodium bicarbonate, 650 mg, Oral, TID  sodium chloride, 10 mL, Intravenous, Q12H      IV Meds:        Results Reviewed:   I have personally reviewed the results from the time of this admission to 2025 14:21 EDT     Results from last 7 days   Lab Units 25  0546 25  0331 25  0438    SODIUM mmol/L 142 142 139   POTASSIUM mmol/L 4.6 4.6 4.3   CHLORIDE mmol/L 109* 110* 106   CO2 mmol/L 19.7* 19.5* 18.5*   BUN mg/dL 57* 54* 56*   CREATININE mg/dL 3.50* 3.22* 3.25*   CALCIUM mg/dL 8.8 8.9 8.5*   GLUCOSE mg/dL 131* 127* 134*       Estimated Creatinine Clearance: 17.3 mL/min (A) (by C-G formula based on SCr of 3.5 mg/dL (H)).    Results from last 7 days   Lab Units 05/14/25  0331 05/13/25  0438 05/10/25  0314   PHOSPHORUS mg/dL 4.5 4.7* 5.5*               Results from last 7 days   Lab Units 05/16/25  0546 05/15/25  0349 05/14/25  0331 05/13/25  0438 05/12/25  0951   WBC 10*3/mm3 11.52* 12.92* 12.67* 13.13* 14.12*   HEMOGLOBIN g/dL 8.6* 8.1* 8.1* 8.0* 8.4*   PLATELETS 10*3/mm3 322 310 305 303 308             Assessment / Plan     ASSESSMENT:  Acute kidney injury on CKD 4 baseline creatinine 2.3-2.6.  Peak creatinine 3.65 925.  Creatinine had plateaued at 3.2.  Anion gap metabolic acidosis due to renal failure. No intervention planned.  No dialysis per her wishes ( no acute indication).  Did not draw chemistries today.  No plan for intervention.  Hypertension with CKD. Increased bisoprolol 5/13/2025.  Overall better.  Diabetes mellitus type 2 with CKD  Lower extremity wound with ischemic rest pain.  Significant peripheral arterial disease.  Goals of care noted and palliative care consult.  No further procedures planned.  5.  Anemia of chronic kidney disease.  6.  Hypothyroid on replacement. TSH 15. Increase replacement .  PLAN:  Nothing to add today from a renal standpoint.  Will continue current therapy for now  Plan for discharge today with possible hospice as outpatient  Thank you for involving us in the care of Halie Guidry.  Please feel free to call with any questions.    Brendon Aguilar MD  05/16/25  14:21 EDT    [facility]  [phone]    Please note that portions of this note were completed with a voice recognition program.

## 2025-05-16 NOTE — SIGNIFICANT NOTE
05/16/25 1537   EMS Transportation Request   Request Status Initiate   Is this an urgent transport? No   Is the current location the  location for EMS? Yes   Transport to? Facility   Name and address of receiving facility [address] , [place] [id number]   Can the patient stand/pivot? No   Is a stretcher required? Yes   Has a Statement of Medical Necessity for Ambulance Transportation been completed? Yes   Equipment needed during transport None

## 2025-05-20 ENCOUNTER — HOSPITAL ENCOUNTER (OUTPATIENT)
Facility: HOSPITAL | Age: 85
Discharge: HOME | End: 2025-05-20
Payer: MEDICARE

## 2025-05-23 PROBLEM — M79.606 LEG PAIN: Status: ACTIVE | Noted: 2025-05-23

## 2025-05-23 NOTE — PROGRESS NOTES
This patient is normally followed by the [facility] group in the office and during previous hospitalizations  Will defer consult to their group but I appreciate the consideration

## 2025-05-23 NOTE — CONSULTS
[facility]   Inpatient Palliative Care Consultation  Patient Name: Halie Guidry   Patient : 1940   Date: 2025   Consulting Provider: Dr Bertram Clark  Reason for Consultation: assistance with clarification of goals of care and hospice referral or discussion  Inpatient Palliative Care MD Consult  Consult performed by: Lizette Burleson APRN  Consult ordered by: Lewis Doss MD      Inpatient Palliative Care Team Consult  Consult performed by: Lizette Burleson APRN  Consult ordered by: Peter Sanz APRN      Inpatient Palliative Care Team Consult  Consult performed by: Lizette Burleson APRN  Consult ordered by: Peter Sanz APRN            Chief Complaint:     Information obtained from: patient and relative-son, Derrick    Summary of Palliative Illness and Palliative Assessment: 84 y.o. female with chronic kidney diseases stage 4, peripheral artery disease, diabetes mellitus type II, hypertension, hypothyroidism, and pancreatic mass, who presented to the [facility] due to leg swelling and pain. She was started on IV Zoysn and Vancomycin while in ED. Consult placed for vascular ( bilateral lower extremity severe ischemic-peripheral vascular disease) and nephrology (acute kidney injury on chronic kidney disease stage IV). Of note, she had just been at hospital and refused dialysis, along with  vascular interventions for ischemic disease. In fact, I saw her and spoke with son and attempted to get support with hospice in outpatient setting, however they had difficulty with getting in touch him and she didn't utilize their services at discharge.       Symptom Assessment:     Pain Assessment:   Intensity: 10  Quality:   throbbing  Duration: acute  Location: Legs  Frequency: constant  Effect on Daily Life: Pain has GREAT impact on daily life  Aggravating Factors: movement and palpation  Relieving Factors: analgesics and rest  Goal for Pain  Management:     Dyspnea Assessment:   Current Level of Dyspnea: 0      Constipation Assessment:   Current Level of Constipation: 0 No constipation      [place] Symptom Assessment Scale (ESAS): ESAS completed by Health care professional caregiver  Pain: 10 Worst possible pain  Tiredness: 5  Drowsiness: 6  Nausea: 0 No nausea  Appetite:  unable to asssess  Shortness of breath: 0 No shortness of breath  Depression:  unable to assess  Anxiety: 3  Best Wellbein  Other(Constipation): 0 No constipation    Discussion of Patient/Family Treatment Preferences and Goals of Care: There was a voluntary discussion of advance planning and goals of care discussion. The following were present for the visit: called sonDerrick     Summary of Discussion:  The patient is unable to participate in goals of care conversation due to confusion and hallucinations. I have attempted to call both sons (Derrick and Josse) and no answer  throughout day. I have left voicemail with Josse and unable for Derrick. I attempted again to call Derrick at 1532 and was able to discuss his mothers conditions. He recalls her previous conversations from last admission. He does express some sadness and shock because he was not aware that his mother had been admitted to the hospital again. His son is getting  tomorrow and he was hopeful that she would be able to attend, which patient shared with me last admission. He was updated on her current state of health. He is agreeable to hospice consult to support patient so I placed referral. He confirms she is DNR, DNI, no cardioversion and no dialysis. In addition, they are NOT interested in vascular interventions. He wants her pain to managed and her to be comfortable. He is planning to visit later today.I spoke with RN and followed up with patient from this morning, she appears more comfortable with current regimen. Hospice updated me and they are scheduled to see on Hank, May 25 at 1230 per Derrick  request due to son's wedding. I spoke with SERGE Su and PAUL Burkett.       Review of Systems: Review of Systems - Negative except pain in lower legs, tiredness       Past Medical and Surgical History:    Past Medical History:   Diagnosis Date    Acute metabolic encephalopathy 06/24/2022    Anxiety     Arthritis     Benign essential hypertension 08/20/2014    Bleeding disorder     Depression     Diabetes     Diabetes mellitus, type 2     Disc degeneration, lumbar     Headache, tension-type     Hyperlipidemia     Hypothyroidism     Neuropathy     Osteoporosis 09/09/2015    Pancreatic mass     Sept 2023, on Monthly Octreotide Injection    Peripheral neuropathy     Rotator cuff tear, left     Scoliosis     Shoulder pain     LEFT, TORN ROTATOR CUFF S/P FALL    Spinal stenosis       Past Surgical History:   Procedure Laterality Date    APPENDECTOMY      BILATERAL BREAST REDUCTION Bilateral 08/2015    CATARACT EXTRACTION  03/2015    CHOLECYSTECTOMY WITH INTRAOPERATIVE CHOLANGIOGRAM N/A 3/27/2022    Procedure: CHOLECYSTECTOMY LAPAROSCOPIC INTRAOPERATIVE CHOLANGIOGRAM;  Surgeon: Aiyana Hill MD;  Location: [facility] OR;  Service: General;  Laterality: N/A;    COLONOSCOPY  06/05/2015    WNL    ERCP N/A 2/25/2022    Procedure: ENDOSCOPIC RETROGRADE CHOLANGIOPANCREATOGRAPHY with sphincterotomy and balloon sweep;  Surgeon: Chilo Wilhelm MD;  Location: [facility] ENDOSCOPY;  Service: Gastroenterology;  Laterality: N/A;  PRE/POST - CBD stones    ERCP N/A 3/28/2022    Procedure: ENDOSCOPIC RETROGRADE CHOLANGIOPANCREATOGRAPHY WITH SPHINCTEROTOMY AND BALLOON SWEEP;  Surgeon: Chilo Wilhelm MD;  Location: [facility] ENDOSCOPY;  Service: Gastroenterology;  Laterality: N/A;  PRE: COMMON DUCT STONE  POST: COMMON DUCT STONE    KYPHOPLASTY      REDUCTION MAMMAPLASTY      TONSILLECTOMY            Palliative Care Psychosocial and Spiritual Screening    Living situation [facility]     No spiritual concerns  "identified      Physical Assessment:   /64 (BP Location: Left arm, Patient Position: Lying)   Pulse 73   Temp 97.6 °F (36.4 °C) (Oral)   Resp 16   Ht 172.7 cm (68\")   Wt 97.3 kg (214 lb 8.1 oz)   SpO2 93%   BMI 32.62 kg/m²    Palliative Performance Scale: Performance 40% based on the following measures: Ambulation: Mainly in bed, Activity and Evidence of Disease: Unable to do any work, extensive evidence of disease, Self-Care: Mainly assistance required,  Intake: Normal or reduced, LOC: Full, drowsy or confusion  Physical Exam  Constitutional:       Appearance: She is ill-appearing.   Cardiovascular:      Rate and Rhythm: Normal rate.      Comments: Bilateral lower legs with erythema (patient refused touching)  Pulmonary:      Effort: Pulmonary effort is normal.   Abdominal:      General: Bowel sounds are normal.      Palpations: Abdomen is soft.      Tenderness: There is no abdominal tenderness.      Comments: Purewick with john urine noted    Neurological:      Comments: Oriented to self and location   Unsure of situation fully    Psychiatric:         Attention and Perception: She perceives visual hallucinations.            Medications:    Current Facility-Administered Medications:     acetaminophen (TYLENOL) tablet 650 mg, 650 mg, Oral, Q4H PRN **OR** acetaminophen (TYLENOL) 160 MG/5ML oral solution 650 mg, 650 mg, Oral, Q4H PRN **OR** acetaminophen (TYLENOL) suppository 650 mg, 650 mg, Rectal, Q4H PRN, Peter Sanz APRN    atorvastatin (LIPITOR) tablet 80 mg, 80 mg, Oral, Nightly, Peter Sanz APRN    sennosides-docusate (PERICOLACE) 8.6-50 MG per tablet 2 tablet, 2 tablet, Oral, BID PRN **AND** polyethylene glycol (MIRALAX) packet 17 g, 17 g, Oral, Daily PRN **AND** bisacodyl (DULCOLAX) EC tablet 5 mg, 5 mg, Oral, Daily PRN **AND** bisacodyl (DULCOLAX) suppository 10 mg, 10 mg, Rectal, Daily PRN, Peter Sanz APRN    bisoprolol (ZEBeta) tablet 10 mg, 10 mg, Oral, Daily, Peter Sanz" SERGE WAN    calcium carbonate (TUMS) chewable tablet 500 mg (200 mg elemental), 2 tablet, Oral, BID PRN, Peter Sanz APRN    dextrose (D50W) (25 g/50 mL) IV injection 25 g, 25 g, Intravenous, Q15 Min PRN, Peter Sanz APRN    dextrose (GLUTOSE) oral gel 15 g, 15 g, Oral, Q15 Min PRN, Peter Sanz APRN    ferrous sulfate tablet 325 mg, 325 mg, Oral, Every Other Day, Peter Sanz APRN    fluticasone (FLONASE) 50 MCG/ACT nasal spray 2 spray, 2 spray, Nasal, Daily, Peter Sanz APRN    glucagon (GLUCAGEN) injection 1 mg, 1 mg, Intramuscular, Q15 Min PRN, Peter Sanz APRN    hydrALAZINE (APRESOLINE) tablet 100 mg, 100 mg, Oral, Q8H, Peter Sanz APRN    HYDROcodone-acetaminophen (NORCO)  MG per tablet 1 tablet, 1 tablet, Oral, Q6H PRN, Peter Sanz APRN    HYDROmorphone (DILAUDID) injection 0.5 mg, 0.5 mg, Intravenous, Q2H PRN, Peter Sanz APRN, 0.5 mg at 05/23/25 0635    hydrOXYzine (ATARAX) tablet 25 mg, 25 mg, Oral, TID PRN, Peter Sanz APRN    insulin glargine (LANTUS, SEMGLEE) injection 20 Units, 20 Units, Subcutaneous, Q12H, Peter Sanz APRN    insulin lispro (HUMALOG/ADMELOG) injection 2-7 Units, 2-7 Units, Subcutaneous, 4x Daily AC & at Bedtime, Peter Sanz APRN    levothyroxine (SYNTHROID, LEVOTHROID) tablet 150 mcg, 150 mcg, Oral, Q AM, Pteer Sanz APRN    ondansetron ODT (ZOFRAN-ODT) disintegrating tablet 4 mg, 4 mg, Oral, Q6H PRN **OR** ondansetron (ZOFRAN) injection 4 mg, 4 mg, Intravenous, Q6H PRN, Peter Sanz APRN    pantoprazole (PROTONIX) EC tablet 40 mg, 40 mg, Oral, Q AM, Peter Sanz APRN    Pharmacy to dose vancomycin, , Not Applicable, Continuous PRN, Peter Sanz APRN    Pharmacy To Dose: Piperacillin-tazobactam (Zosyn), , Not Applicable, Continuous PRN, Peter Sanz APRN    piperacillin-tazobactam (ZOSYN) 3.375 g IVPB in 100 mL NS MBP (CD), 3.375 g, Intravenous, Q12H, Peter Sanz APRN, 3.375 g at 05/23/25 1030     pregabalin (LYRICA) capsule 25 mg, 25 mg, Oral, BID, Peter Sanz APRN    sodium bicarbonate tablet 650 mg, 650 mg, Oral, TID, Peter Sanz APRN    [COMPLETED] Insert Peripheral IV, , , Once **AND** sodium chloride 0.9 % flush 10 mL, 10 mL, Intravenous, PRN, Peter Sanz APRN    sodium chloride 0.9 % flush 10 mL, 10 mL, Intravenous, Q12H, Peter Sanz APRN, 10 mL at 05/23/25 1030    sodium chloride 0.9 % flush 10 mL, 10 mL, Intravenous, PRN, Peter Sanz APRN    sodium chloride 0.9 % infusion 40 mL, 40 mL, Intravenous, PRN, Peter Sanz APRN     Allergies:   Allergies   Allergen Reactions    Sulfa Antibiotics Unknown - High Severity     Pt says it was a long time ago and I don't remember but it wasn't good.           Data (labs/images reviewed):   WBC   Date Value Ref Range Status   05/23/2025 13.26 (H) 3.40 - 10.80 10*3/mm3 Final     RBC   Date Value Ref Range Status   05/23/2025 2.74 (L) 3.77 - 5.28 10*6/mm3 Final     Hemoglobin   Date Value Ref Range Status   05/23/2025 7.4 (L) 12.0 - 15.9 g/dL Final     Hematocrit   Date Value Ref Range Status   05/23/2025 22.6 (L) 34.0 - 46.6 % Final     MCV   Date Value Ref Range Status   05/23/2025 82.5 79.0 - 97.0 fL Final     MCH   Date Value Ref Range Status   05/23/2025 27.0 26.6 - 33.0 pg Final     MCHC   Date Value Ref Range Status   05/23/2025 32.7 31.5 - 35.7 g/dL Final     RDW   Date Value Ref Range Status   05/23/2025 15.4 12.3 - 15.4 % Final     RDW-SD   Date Value Ref Range Status   05/23/2025 45.6 37.0 - 54.0 fl Final     MPV   Date Value Ref Range Status   05/23/2025 9.3 6.0 - 12.0 fL Final     Platelets   Date Value Ref Range Status   05/23/2025 392 140 - 450 10*3/mm3 Final     Neutrophil %   Date Value Ref Range Status   05/23/2025 82.5 (H) 42.7 - 76.0 % Final     Lymphocyte %   Date Value Ref Range Status   05/23/2025 6.8 (L) 19.6 - 45.3 % Final     Monocyte %   Date Value Ref Range Status   05/23/2025 6.5 5.0 - 12.0 % Final      Eosinophil %   Date Value Ref Range Status   05/23/2025 2.9 0.3 - 6.2 % Final     Basophil %   Date Value Ref Range Status   05/23/2025 0.8 0.0 - 1.5 % Final     Immature Grans %   Date Value Ref Range Status   05/23/2025 0.5 0.0 - 0.5 % Final     Neutrophils, Absolute   Date Value Ref Range Status   05/23/2025 10.72 (H) 1.70 - 7.00 10*3/mm3 Final     Lymphocytes, Absolute   Date Value Ref Range Status   05/23/2025 0.88 0.70 - 3.10 10*3/mm3 Final     Monocytes, Absolute   Date Value Ref Range Status   05/23/2025 0.85 0.10 - 0.90 10*3/mm3 Final     Eosinophils, Absolute   Date Value Ref Range Status   05/23/2025 0.38 0.00 - 0.40 10*3/mm3 Final     Basophils, Absolute   Date Value Ref Range Status   05/23/2025 0.10 0.00 - 0.20 10*3/mm3 Final     Immature Grans, Absolute   Date Value Ref Range Status   05/23/2025 0.06 (H) 0.00 - 0.05 10*3/mm3 Final     nRBC   Date Value Ref Range Status   05/23/2025 0.0 0.0 - 0.2 /100 WBC Final        Lab Results   Component Value Date    GLUCOSE 134 (H) 05/23/2025    BUN 83 (H) 05/23/2025    CREATININE 4.82 (H) 05/23/2025     05/23/2025    K 4.5 05/23/2025     05/23/2025    CALCIUM 7.7 (L) 05/23/2025    PROTEINTOT 8.0 05/23/2025    ALBUMIN 3.1 (L) 05/23/2025    ALT 6 05/23/2025    AST 12 05/23/2025    ALKPHOS 126 (H) 05/23/2025    BILITOT 0.2 05/23/2025    GLOB 4.9 05/23/2025    AGRATIO 0.6 05/23/2025    BCR 17.2 05/23/2025    ANIONGAP 14.0 05/23/2025    EGFR 8.4 (L) 05/23/2025        US Renal Bilateral  US RENAL BILATERAL-     Clinical: Acute renal insufficiency     COMPARISON 4/30/2024     FINDINGS: The right kidney measures 10.1 cm in length and the left  kidney measures 10.8 cm in length. Renal cortical echotexture is greater  than anticipated consistent with medical renal disease. No  hydronephrosis on the right or left. Bilateral renal cysts again  demonstrated with the largest on the right measuring 2.3 cm in maximum  diameter and the largest on the left  measuring 3.8 cm in maximum  diameter. No renal calculus. Limited images of the bladder fail to  demonstrate either ureteral jet. Small amount of suspended debris is  suggested within the bladder lumen.     CONCLUSION: Renal cortical echotexture consistent with medical renal  disease, no hydronephrosis. Bilateral renal cysts. Neither ureteral jet  could be documented.     This report was finalized on 5/9/2025 7:28 PM by Dr. Eduardo Lpoez M.D  on Workstation: [id number]     Adult Transthoracic Echo Complete W/ Cont if Necessary Per Protocol    Left ventricular ejection fraction appears to be 56 - 60%.    Left ventricular diastolic function was normal.           Palliative Care Assessment and Recommendations: Halie Guidry is a 84 y.o. female with a primary palliative care diagnosis of severe peripheral vascular disease and chronic kidney disease stage IV. Palliative care was consulted for support with goals of care, pain management and hospice conversation.     Prognosis and Palliative Performance Scale:  Performance 40% based on the following measures: Ambulation: Mainly in bed, Activity and Evidence of Disease: Unable to do any work, extensive evidence of disease, Self-Care: Mainly assistance required,  Intake: Normal or reduced, LOC: Full, drowsy or confusion  Disease State: Deteriorating despite treatments  Symptom Management:   Pain: Norco 10 mg every 6 hours prn, dilaudid 0.5 mg IV every 2 hours prn  Shortness of Breath: none  Constipation: dulcolax prn   Other Palliative Symptoms: anxiety-atarax 25 mg tid  Goals of Care Treatment Preferences   Palliative Care Decision Making Capacity: unreliable  Healthcare Surrogate: Yes and Per health care directive  What is Most important to patient/family at this time: patient to be comfortable  Code Status DNR  Other Considerations regarding life sustaining treatments: NO intubation, no cardioversion, no dialysis   Other Recommendations: hospice consult to  evaluate  Follow up: tomorrow   Discussed plan with: RN, family, [facility], and Peter VALENTINE      I spent a total of 55 minutes today caring for patient including reviewing records, speaking with patient/family and collaborating with care team.           Thank you for consulting palliative care. If you need to reach the provider on call for the palliative care team please call [phone]      SERGE Francis  5/23/2025 11:01 EDT

## 2025-05-23 NOTE — PROGRESS NOTES
"Spoke with son--\"Derrick\"--who prefers \"DNR\" & palliative to follow for anticipated hospice consult for comfort measures.  JA  "

## 2025-05-23 NOTE — PROGRESS NOTES
[facility] Clinical Pharmacy Services: Piperacillin-Tazobactam Consult    Pt Name: Halie Guidry   : 1940    Indication: Skin and Soft Tissue    Relevant clinical data and objective history reviewed:    Past Medical History:   Diagnosis Date    Acute metabolic encephalopathy 2022    Anxiety     Arthritis     Benign essential hypertension 2014    Bleeding disorder     Depression     Diabetes     Diabetes mellitus, type 2     Disc degeneration, lumbar     Headache, tension-type     Hyperlipidemia     Hypothyroidism     Neuropathy     Osteoporosis 2015    Pancreatic mass     2023, on Monthly Octreotide Injection    Peripheral neuropathy     Rotator cuff tear, left     Scoliosis     Shoulder pain     LEFT, TORN ROTATOR CUFF S/P FALL    Spinal stenosis      Creatinine   Date Value Ref Range Status   2025 4.85 (H) 0.57 - 1.00 mg/dL Final   2025 3.50 (H) 0.57 - 1.00 mg/dL Final   2025 3.22 (H) 0.57 - 1.00 mg/dL Final     BUN   Date Value Ref Range Status   2025 84 (H) 8 - 23 mg/dL Final     Estimated Creatinine Clearance: 11.4 mL/min (A) (by C-G formula based on SCr of 4.85 mg/dL (H)).    Lab Results   Component Value Date    WBC 12.99 (H) 2025     Temp Readings from Last 3 Encounters:   25 97.7 °F (36.5 °C)   25 98.1 °F (36.7 °C) (Oral)   25 98 °F (36.7 °C)      Assessment/Plan  Estimated CrCl <20 mL/min at this time; BMI 38 kg/m2  Will start piperacillin-tazobactam 3.375 g IV every 12 hours     Pharmacy will continue to follow daily while on piperacillin-tazobactam and adjust as needed. Thank you for this consult.    Calvin Tate III, [facility]  Clinical Pharmacist

## 2025-05-23 NOTE — PROGRESS NOTES
[facility] Clinical Pharmacy Services: Vancomycin Level Monitoring Note    Halie Guidry is a 84 y.o. female who is on day 1/5 of pharmacy to dose vancomycin for Skin and Soft Tissue.    Estimated Creatinine Clearance: 10.6 mL/min (A) (by C-G formula based on SCr of 4.82 mg/dL (H)).    Current Vanc Dose: intermittent dosing  Results from last 7 days   Lab Units 05/23/25  1701   VANCOMYCIN RM mcg/mL 26.40     Vanc random drawn 12 hr post LD is 26.4. No need for supplemental dose at this time, will continue to dose by levels and redose when appropriate  Next Level Date and Time: Vanc Random on 5/24 @ 0600    No changes at this time. Pharmacy is continuing to monitor and will adjust as needed.    Michelle Dove [facility]  Clinical Pharmacist

## 2025-05-23 NOTE — ED PROVIDER NOTES
EMERGENCY DEPARTMENT ENCOUNTER    Room Number:  P477/1  PCP: Provider, No Known  Patient Care Team:  Provider, No Known as PCP - Lilia Sánchez APRN as Nurse Practitioner (Nurse Practitioner)  Beena Laguerre APRN as Referring Physician (Gastroenterology)  Napoleon Gutierrez MD as Consulting Physician (Hematology and Oncology)  Stefan Gutierrez MD as Consulting Physician (Pain Medicine)  Merlyn Mon APRN as Nurse Practitioner (Nurse Practitioner)  Christina Wells APRN as Nurse Practitioner (Nephrology)  Carol Smiley RN as Ambulatory  (Population Health)   Independent Historians: Patient    HPI:  Chief Complaint: Lower extremity pain    A complete HPI/ROS/PMH/PSH/SH/FH are unobtainable due to: None    Chronic or social conditions impacting patient care (Social Determinants of Health): None  (Financial Resource Strain / Food Insecurity / Transportation Needs / Physical Activity / Stress / Social Connections / Intimate Partner Violence / Housing Stability)    Context: Halie Guidry is a 84 y.o. female who presents to the ED c/o acute pain to bilateral lower extremities.  Patient has history of peripheral vascular disease.  Was recently admitted with worsening claudication and lower remedy pain.  Patient was offered CTA by vascular surgery MVA revascularization.  Patient also has history of CKD and refused.  Patient subsequently referred to hospice.  It is not clear to me when I talk to the patient if she successfully connected with hospice.  Patient reports she has had increasing pain at her assisted living home.  Also has developed foul-smelling discharge from her lower extremities.  Patient has faint palpable DP pulses bilaterally.    Review of prior external notes (non-ED) -and- Review of prior external test results outside of this encounter: I have reviewed patient's discharge summary from 5/16/2025    Prescription drug monitoring program review:         PAST MEDICAL  HISTORY  Active Ambulatory Problems     Diagnosis Date Noted    Compression fracture 04/22/2009    Lumbar degenerative disc disease 07/05/2012    Type 2 diabetes mellitus with hyperglycemia, with long-term current use of insulin     Hyperlipidemia     Benign essential hypertension 08/20/2014    Primary hypothyroidism     Neuropathy     Osteoporosis 09/09/2015    Proteinuria 02/28/2012    Tobacco abuse 08/22/2013    Generalized weakness 05/27/2017    Spinal stenosis 05/27/2017    Scoliosis 05/27/2017    Arthritis 05/27/2017    VBI (vertebrobasilar insufficiency) 05/28/2017    Orthostatic hypotension 05/28/2017    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 05/28/2017    Hypothyroidism (acquired) 05/30/2017    Noncompliance with medication regimen 05/30/2017    Vitamin D deficiency disease 06/01/2017    Tremor 01/09/2018    Seizure 05/21/2019    Thoracic degenerative disc disease 01/27/2020    Acute kidney injury 12/02/2021    Hyperglycemia 12/02/2021    Type 2 diabetes mellitus with hyperglycemia 12/02/2021    Lower abdominal pain 02/23/2022    Transaminitis 02/23/2022    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    Choledocholithiasis 02/23/2022    Hypokalemia 02/24/2022    Hypoxia 02/24/2022    Generalized abdominal pain 03/26/2022    History of Clostridium difficile infection 03/26/2022    History of ERCP 03/26/2022    Hypomagnesemia 03/28/2022    Anxiety disorder 05/27/2022    Neuropathic pain 05/27/2022    Intertrigo 05/27/2022    Weakness of both lower extremities 06/24/2022    Accelerated hypertension 09/01/2022    Acute cystitis without hematuria 09/06/2022    Left lower lobe pneumonia 11/04/2022    Cellulitis and abscess of left lower extremity 05/15/2023    Benign hypertension with CKD (chronic kidney disease) stage IV 06/02/2023    Peripheral edema 07/30/2023    Primary malignant neuroendocrine tumor of pancreas 08/24/2023    Encounter for long-term (current) use of high-risk medication 08/28/2023     Diabetic foot ulcer 12/24/2023    CKD (chronic kidney disease) stage 4, GFR 15-29 ml/min 01/28/2024    Pressure injury of buttock, stage 2 01/28/2024    HTN (hypertension) 04/21/2024    Anemia due to chronic kidney disease 05/17/2024    Bilateral lower extremity edema 08/24/2024    Cellulitis of right lower extremity 08/24/2024    CKD (chronic kidney disease) stage 4, GFR 15-29 ml/min 08/24/2024    Acute CHF 08/24/2024    Bilateral cellulitis of lower leg 08/25/2024    Acute UTI 10/10/2024    UTI (urinary tract infection) 10/10/2024    Acute UTI (urinary tract infection) 10/11/2024    Metabolic acidosis 10/11/2024    Cobalamin deficiency 11/30/2022    Dependent edema 11/30/2022    Gastroesophageal reflux disease 11/30/2022    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    Pancreatic neoplasm 08/17/2023    Congestive heart failure 11/30/2022    CHF exacerbation 11/05/2024    Acute exacerbation of CHF (congestive heart failure) 11/05/2024    Hypertensive urgency 02/17/2025    Acute on chronic renal failure 05/07/2025    Diabetes 05/08/2025    PVD (peripheral vascular disease) with claudication 05/08/2025    Cellulitis 05/08/2025    Anemia 05/08/2025     Resolved Ambulatory Problems     Diagnosis Date Noted    Leukocytosis     Diabetic eye exam 02/12/2014    Altered mental status 12/15/2017    Stage 3a chronic kidney disease 03/09/2012    Metabolic encephalopathy 12/02/2021    COVID-19 virus detected 02/23/2022    UTI (urinary tract infection) 02/23/2022    Acute UTI (urinary tract infection) 03/27/2022    Burning pain 05/27/2022    Acute metabolic encephalopathy 06/22/2022    Hypoglycemia 06/23/2022    Acute metabolic encephalopathy 06/24/2022    Altered mental status, unspecified altered mental status type 11/04/2022    Diarrhea 11/04/2022    Acute pain of left foot 05/13/2023    Bilateral leg pain 07/21/2023    Acute renal insufficiency 01/09/2024    COVID-19 01/10/2024    Cytokine release syndrome, grade 1 01/15/2024     C. difficile diarrhea 01/15/2024    Sepsis 01/27/2024    Metabolic encephalopathy 01/28/2024    Uncontrolled diabetes mellitus with hyperglycemia 04/20/2024    Pseudohyponatremia 04/21/2024     Past Medical History:   Diagnosis Date    Anxiety     Bleeding disorder     Depression     Diabetes mellitus, type 2     Disc degeneration, lumbar     Headache, tension-type     Hypothyroidism     Pancreatic mass     Peripheral neuropathy     Rotator cuff tear, left     Shoulder pain          PAST SURGICAL HISTORY  Past Surgical History:   Procedure Laterality Date    APPENDECTOMY      BILATERAL BREAST REDUCTION Bilateral 08/2015    CATARACT EXTRACTION  03/2015    CHOLECYSTECTOMY WITH INTRAOPERATIVE CHOLANGIOGRAM N/A 3/27/2022    Procedure: CHOLECYSTECTOMY LAPAROSCOPIC INTRAOPERATIVE CHOLANGIOGRAM;  Surgeon: Aiyana Hill MD;  Location: [facility] MAIN OR;  Service: General;  Laterality: N/A;    COLONOSCOPY  06/05/2015    WNL    ERCP N/A 2/25/2022    Procedure: ENDOSCOPIC RETROGRADE CHOLANGIOPANCREATOGRAPHY with sphincterotomy and balloon sweep;  Surgeon: Chilo Wilhelm MD;  Location: [facility] ENDOSCOPY;  Service: Gastroenterology;  Laterality: N/A;  PRE/POST - CBD stones    ERCP N/A 3/28/2022    Procedure: ENDOSCOPIC RETROGRADE CHOLANGIOPANCREATOGRAPHY WITH SPHINCTEROTOMY AND BALLOON SWEEP;  Surgeon: Chilo Wilhelm MD;  Location: [facility] ENDOSCOPY;  Service: Gastroenterology;  Laterality: N/A;  PRE: COMMON DUCT STONE  POST: COMMON DUCT STONE    KYPHOPLASTY      REDUCTION MAMMAPLASTY      TONSILLECTOMY           FAMILY HISTORY  Family History   Problem Relation Age of Onset    Bone cancer Mother     No Known Problems Father     Heart disease Daughter          SOCIAL HISTORY  Social History     Socioeconomic History    Marital status:     Number of children: 3   Tobacco Use    Smoking status: Former     Current packs/day: 0.00     Average packs/day: 1 pack/day for 40.0 years (40.0 ttl pk-yrs)     Types:  Cigarettes     Start date:      Quit date: 2013     Years since quittin.4     Passive exposure: Past    Smokeless tobacco: Never   Vaping Use    Vaping status: Never Used   Substance and Sexual Activity    Alcohol use: No    Drug use: No    Sexual activity: Defer         ALLERGIES  Sulfa antibiotics        REVIEW OF SYSTEMS  Review of Systems  Included in HPI  All systems reviewed and negative except for those discussed in HPI.      PHYSICAL EXAM    I have reviewed the triage vital signs and nursing notes.    ED Triage Vitals [25 0246]   Temp Heart Rate Resp BP SpO2   97.7 °F (36.5 °C) 68 20 133/53 94 %      Temp src Heart Rate Source Patient Position BP Location FiO2 (%)   -- -- -- -- --       Physical Exam  GENERAL: alert, no acute distress  SKIN: Warm, dry, peripheral vascular changes to bilateral lower extremities there is skin breakdown and scaling there is also purulent discharge from ulceration to right leg  HENT: Normocephalic, atraumatic  EYES: no scleral icterus  CV: regular rhythm, regular rate  RESPIRATORY: normal effort, lungs clear  ABDOMEN: soft, nontender, nondistended  MUSCULOSKELETAL: no deformity  NEURO: alert, moves all extremities, follows commands                                                               LAB RESULTS  Recent Results (from the past 24 hours)   Basic Metabolic Panel    Collection Time: 25  7:40 AM    Specimen: Blood   Result Value Ref Range    Glucose 134 (H) 65 - 99 mg/dL    BUN 83 (H) 8 - 23 mg/dL    Creatinine 4.82 (H) 0.57 - 1.00 mg/dL    Sodium 139 136 - 145 mmol/L    Potassium 4.5 3.5 - 5.2 mmol/L    Chloride 105 98 - 107 mmol/L    CO2 20.0 (L) 22.0 - 29.0 mmol/L    Calcium 7.7 (L) 8.6 - 10.5 mg/dL    BUN/Creatinine Ratio 17.2 7.0 - 25.0    Anion Gap 14.0 5.0 - 15.0 mmol/L    eGFR 8.4 (L) >60.0 mL/min/1.73   CBC (No Diff)    Collection Time: 25  7:40 AM    Specimen: Blood   Result Value Ref Range    WBC 13.26 (H) 3.40 - 10.80 10*3/mm3    RBC  2.74 (L) 3.77 - 5.28 10*6/mm3    Hemoglobin 7.4 (L) 12.0 - 15.9 g/dL    Hematocrit 22.6 (L) 34.0 - 46.6 %    MCV 82.5 79.0 - 97.0 fL    MCH 27.0 26.6 - 33.0 pg    MCHC 32.7 31.5 - 35.7 g/dL    RDW 15.4 12.3 - 15.4 %    RDW-SD 45.6 37.0 - 54.0 fl    MPV 9.3 6.0 - 12.0 fL    Platelets 392 140 - 450 10*3/mm3   Hemoglobin A1c    Collection Time: 05/23/25  7:40 AM    Specimen: Blood   Result Value Ref Range    Hemoglobin A1C 7.50 (H) 4.80 - 5.60 %   POC Glucose Once    Collection Time: 05/23/25 11:42 AM    Specimen: Blood   Result Value Ref Range    Glucose 131 (H) 70 - 130 mg/dL   POC Glucose Once    Collection Time: 05/23/25  4:28 PM    Specimen: Blood   Result Value Ref Range    Glucose 189 (H) 70 - 130 mg/dL   Vancomycin, Random    Collection Time: 05/23/25  5:01 PM    Specimen: Blood   Result Value Ref Range    Vancomycin Random 26.40 5.00 - 40.00 mcg/mL   POC Glucose Once    Collection Time: 05/23/25  8:08 PM    Specimen: Blood   Result Value Ref Range    Glucose 161 (H) 70 - 130 mg/dL   Basic Metabolic Panel    Collection Time: 05/24/25  6:12 AM    Specimen: Blood   Result Value Ref Range    Glucose 58 (L) 65 - 99 mg/dL    BUN 75 (H) 8 - 23 mg/dL    Creatinine 4.72 (H) 0.57 - 1.00 mg/dL    Sodium 142 136 - 145 mmol/L    Potassium 4.6 3.5 - 5.2 mmol/L    Chloride 110 (H) 98 - 107 mmol/L    CO2 16.0 (L) 22.0 - 29.0 mmol/L    Calcium 7.6 (L) 8.6 - 10.5 mg/dL    BUN/Creatinine Ratio 15.9 7.0 - 25.0    Anion Gap 16.0 (H) 5.0 - 15.0 mmol/L    eGFR 8.6 (L) >60.0 mL/min/1.73   Vancomycin, Random    Collection Time: 05/24/25  6:12 AM    Specimen: Blood   Result Value Ref Range    Vancomycin Random 23.10 5.00 - 40.00 mcg/mL   CBC Auto Differential    Collection Time: 05/24/25  6:12 AM    Specimen: Blood   Result Value Ref Range    WBC 14.02 (H) 3.40 - 10.80 10*3/mm3    RBC 2.74 (L) 3.77 - 5.28 10*6/mm3    Hemoglobin 7.4 (L) 12.0 - 15.9 g/dL    Hematocrit 23.2 (L) 34.0 - 46.6 %    MCV 84.7 79.0 - 97.0 fL    MCH 27.0 26.6 -  33.0 pg    MCHC 31.9 31.5 - 35.7 g/dL    RDW 15.3 12.3 - 15.4 %    RDW-SD 47.3 37.0 - 54.0 fl    MPV 9.5 6.0 - 12.0 fL    Platelets 387 140 - 450 10*3/mm3    Neutrophil % 85.2 (H) 42.7 - 76.0 %    Lymphocyte % 4.8 (L) 19.6 - 45.3 %    Monocyte % 7.3 5.0 - 12.0 %    Eosinophil % 1.4 0.3 - 6.2 %    Basophil % 0.7 0.0 - 1.5 %    Immature Grans % 0.6 (H) 0.0 - 0.5 %    Neutrophils, Absolute 11.94 (H) 1.70 - 7.00 10*3/mm3    Lymphocytes, Absolute 0.67 (L) 0.70 - 3.10 10*3/mm3    Monocytes, Absolute 1.03 (H) 0.10 - 0.90 10*3/mm3    Eosinophils, Absolute 0.19 0.00 - 0.40 10*3/mm3    Basophils, Absolute 0.10 0.00 - 0.20 10*3/mm3    Immature Grans, Absolute 0.09 (H) 0.00 - 0.05 10*3/mm3    nRBC 0.0 0.0 - 0.2 /100 WBC       I ordered the above labs and independently reviewed the results.        RADIOLOGY  No Radiology Exams Resulted Within Past 24 Hours    I ordered the above noted radiological studies. Reviewed by me and discussed with radiologist.  See dictation for official radiology interpretation.      PROCEDURES    Procedures      MEDICATIONS GIVEN IN ER    Medications   sodium chloride 0.9 % flush 10 mL (has no administration in time range)   sodium chloride 0.9 % flush 10 mL (10 mL Intravenous Given 5/23/25 2107)   sodium chloride 0.9 % flush 10 mL (has no administration in time range)   sodium chloride 0.9 % infusion 40 mL (has no administration in time range)   acetaminophen (TYLENOL) tablet 650 mg (has no administration in time range)     Or   acetaminophen (TYLENOL) 160 MG/5ML oral solution 650 mg (has no administration in time range)     Or   acetaminophen (TYLENOL) suppository 650 mg (has no administration in time range)   sennosides-docusate (PERICOLACE) 8.6-50 MG per tablet 2 tablet (has no administration in time range)     And   polyethylene glycol (MIRALAX) packet 17 g (has no administration in time range)     And   bisacodyl (DULCOLAX) EC tablet 5 mg (has no administration in time range)     And   bisacodyl  (DULCOLAX) suppository 10 mg (has no administration in time range)   ondansetron ODT (ZOFRAN-ODT) disintegrating tablet 4 mg (has no administration in time range)     Or   ondansetron (ZOFRAN) injection 4 mg (has no administration in time range)   calcium carbonate (TUMS) chewable tablet 500 mg (200 mg elemental) (has no administration in time range)   HYDROmorphone (DILAUDID) injection 0.5 mg (0.5 mg Intravenous Given 5/23/25 2105)   Pharmacy to dose vancomycin (has no administration in time range)   Pharmacy To Dose: Piperacillin-tazobactam (Zosyn) (has no administration in time range)   piperacillin-tazobactam (ZOSYN) 3.375 g IVPB in 100 mL NS MBP (CD) (3.375 g Intravenous New Bag 5/23/25 2337)   atorvastatin (LIPITOR) tablet 80 mg (80 mg Oral Given 5/23/25 2200)   bisoprolol (ZEBeta) tablet 10 mg (10 mg Oral Not Given 5/23/25 2310)   ferrous sulfate tablet 325 mg (325 mg Oral Given 5/23/25 1154)   fluticasone (FLONASE) 50 MCG/ACT nasal spray 2 spray (2 sprays Nasal Not Given 5/23/25 2310)   hydrALAZINE (APRESOLINE) tablet 100 mg (100 mg Oral Given 5/24/25 0620)   HYDROcodone-acetaminophen (NORCO)  MG per tablet 1 tablet (1 tablet Oral Given 5/23/25 2253)   hydrOXYzine (ATARAX) tablet 25 mg (25 mg Oral Given 5/23/25 2254)   insulin glargine (LANTUS, SEMGLEE) injection 20 Units (20 Units Subcutaneous Given 5/23/25 2105)   pantoprazole (PROTONIX) EC tablet 40 mg (40 mg Oral Given 5/24/25 0621)   levothyroxine (SYNTHROID, LEVOTHROID) tablet 150 mcg (150 mcg Oral Given 5/24/25 0620)   pregabalin (LYRICA) capsule 25 mg (25 mg Oral Given 5/23/25 2200)   sodium bicarbonate tablet 650 mg (650 mg Oral Given 5/23/25 2200)   dextrose (GLUTOSE) oral gel 15 g (has no administration in time range)   dextrose (D50W) (25 g/50 mL) IV injection 25 g (has no administration in time range)   glucagon (GLUCAGEN) injection 1 mg (has no administration in time range)   insulin lispro (HUMALOG/ADMELOG) injection 2-7 Units (2 Units  Subcutaneous Given 5/23/25 2105)   heparin (porcine) 5000 UNIT/ML injection 5,000 Units (5,000 Units Subcutaneous Given 5/23/25 2308)   Vancomycin Pharmacy Intermittent/Pulse Dosing (has no administration in time range)   vancomycin 2250 mg/500 mL 0.9% NS IVPB ([facility]) (2,250 mg Intravenous New Bag 5/23/25 0501)   piperacillin-tazobactam (ZOSYN) 3.375 g IVPB in 100 mL NS MBP (CD) (0 g Intravenous Stopped 5/23/25 0457)   HYDROmorphone (DILAUDID) injection 0.5 mg (0.5 mg Intravenous Given 5/23/25 0309)   ondansetron (ZOFRAN) injection 4 mg (4 mg Intravenous Given 5/23/25 0309)   sodium chloride 0.9 % bolus 500 mL (0 mL Intravenous Stopped 5/23/25 0449)         ORDERS PLACED DURING THIS VISIT:  Orders Placed This Encounter   Procedures    Blood Culture - Blood,    Blood Culture - Blood,    CANDIDA AURIS PCR - Swab, Axilla Right, Axilla Left and Groin    Comprehensive Metabolic Panel    Protime-INR    aPTT    CBC Auto Differential    Basic Metabolic Panel    CBC (No Diff)    Vancomycin, Random    Hemoglobin A1c    Basic Metabolic Panel    Vancomycin, Random    CBC Auto Differential    Diet: Regular/House, Cardiac, Diabetic; Healthy Heart (2-3 Na+); Consistent Carbohydrate; Fluid Consistency: Thin (IDDSI 0)    Monitor Blood Pressure    Continuous Pulse Oximetry    Please doppler and haja pulses bl lower extremities  Misc Nursing Order (Specify)    Vital Signs    Intake & Output    Weigh Patient    Oral Care    Saline Lock & Maintain IV Access    Neurovascular Checks    Turn Patient    Elevate Heels Off of Bed    Use Seat Cushion When Up In Chair    Wound Care    Silicone Border Dressing to Bony Prominences    Wound Care    Assess Patient For Urinary Retention    Utilize Voiding Measures if Retention is Suspected    Bladder Scan for Suspected Urinary Retention    Monitor Every 1-2 Hours for Spontaneous Void if Bladder Scan Volume is Less Than 500mL & Patient is Without Symptoms of Bladder Discomfort / Distention    Straight  Cath For Acute Retention if Bladder Scan Volume is Greater Than 500mL or Patient Has Symptoms of Bladder Discomfort / Distention (Unless Contraindicated)    Notify Provider Acute Urinary Retention    Code Status and Medical Interventions: No CPR (Do Not Attempt to Resuscitate); Limited Support; No intubation (DNI), No cardioversion, No dialysis; spoke with sonDerrick    Inpatient Palliative Care Team Consult    LHA (on-call MD unless specified) Details    LHA (on-call MD unless specified) Details    Inpatient Palliative Care Team Consult    Inpatient Vascular Surgery Consult    Inpatient Palliative Care MD Consult    Inpatient Nephrology Consult    Inpatient Case Management  Consult    Inpatient Hospice Consult    OT Consult: Eval & Treat ADL Performance Below Baseline    PT Consult: Eval & Treat Functional Mobility Below Baseline    POC Glucose Once    POC Glucose 4x Daily Before Meals & at Bedtime    POC Glucose Once    POC Glucose Once    POC Glucose Once    Wound Ostomy Eval & Treat    Insert Peripheral IV    Insert Peripheral IV    Inpatient Admission    CBC & Differential    CBC & Differential         PROGRESS, DATA ANALYSIS, CONSULTS, AND MEDICAL DECISION MAKING    All labs have been independently interpreted by me.  All radiology studies have been reviewed by me and discussed with radiologist dictating the report.   EKG's independently viewed and interpreted by me.  Discussion below represents my analysis of pertinent findings related to patient's condition, differential diagnosis, treatment plan and final disposition.    Differential diagnosis includes but is not limited to sepsis, gangrene, cellulitis.    Clinical Scores:              ED Course as of 05/24/25 0732   Fri May 23, 2025   0318 WBC(!): 12.99 [TJ]   0318 Hemoglobin(!): 8.4 [TJ]   0318 Platelets: 404 [TJ]   0356 Has dopplerable dorsalis pedis pulses bilateral feet nursing has marked the spots. [TJ]   0356 I have had an extensive  conversation with the patient regarding her desires.  Patient is very clear that she does not want CPR or intubation or dialysis.  Patient frequently voices that deb is ready to die.  Patient is cannot clearly articulate to me if she has spoken to hospice or what the plan is.  Patient is agreeable to antibiotics at this time.  I will plan to admit the patient for palliative care consult.  Vascular consult as needed. [TJ]   8853 I spoke with Dr Doss regarding pt. [TJ]      ED Course User Index  [TJ] Bertram Clark MD         Critical care provider statement:    Critical care time (minutes): 31.   Critical care time was exclusive of:  Separately billable procedures and treating other patients   Critical care was necessary to treat or prevent imminent or life-threatening deterioration of the following conditions:  Multi-Organ Failure   Critical care was time spent personally by me on the following activities:  Development of treatment plan with patient or surrogate, discussions with consultants, evaluation of patient's response to treatment, examination of patient, obtaining history from patient or surrogate, ordering and performing treatments and interventions, ordering and review of laboratory studies, ordering and review of radiographic studies, pulse oximetry, re-evaluation of patient's condition and review of old charts.    PPE: The patient wore a mask and I wore an N95 mask throughout the entire patient encounter.       AS OF 07:32 EDT VITALS:    BP - 150/69  HR - 81  TEMP - 99.7 °F (37.6 °C) (Axillary)  O2 SATS - 93%        DIAGNOSIS  Final diagnoses:   Cellulitis of lower extremity, unspecified laterality   PVD (peripheral vascular disease)   VIPUL (acute kidney injury)   Chronic kidney disease, unspecified CKD stage         DISPOSITION  ED Disposition       ED Disposition   Decision to Admit    Condition   --    Comment   Level of Care: Telemetry [5]   Diagnosis: Cellulitis [182077]   Admitting  Physician: LAURO MARTIN [[id number]]   Certification: I Certify That Inpatient Hospital Services Are Medically Necessary For Greater Than 2 Midnights                    Note Disclaimer: At [facility], we believe that sharing information builds trust and better relationships. You are receiving this note because you recently visited [facility]. It is possible you will see health information before a provider has talked with you about it. This kind of information can be easy to misunderstand. To help you fully understand what it means for your health, we urge you to discuss this note with your provider.         Bertram Clark MD  05/23/25 0535       Bertram Clark MD  05/24/25 07

## 2025-05-23 NOTE — CONSULTS
Patient Name: Halie Guidry Account #: [id number]    MRN: [id number] Admission Date: 5/23/2025      Consulting Service: Vascular Surgery Date of Evaluation: May 23, 2025    Requesting Provider: Zachariah Ivory DO    CHIEF COMPLAINT: Bilateral severe ischemic rest pain lower extremities  HPI: Halie Guidry is a 84 y.o. female is being seen for a consultation and evaluation/management of bilateral severe ischemic rest pain in the lower extremities now with progression to some cellulitis and infection.  Patient is at end-of-life care and refuses further surgery or even workup.  She wishes to go palliative and have pain control.  This is reasonable as it is not clear that we would be able to salvage her legs without further testing which could lead to renal failure.  In any event the ischemic type of pain she is in is severe and we will ask palliative care to take over to control that as end-of-life care.    PAST MEDICAL HISTORY:   Past Medical History:   Diagnosis Date    Acute metabolic encephalopathy 06/24/2022    Anxiety     Arthritis     Benign essential hypertension 08/20/2014    Bleeding disorder     Depression     Diabetes     Diabetes mellitus, type 2     Disc degeneration, lumbar     Headache, tension-type     Hyperlipidemia     Hypothyroidism     Neuropathy     Osteoporosis 09/09/2015    Pancreatic mass     Sept 2023, on Monthly Octreotide Injection    Peripheral neuropathy     Rotator cuff tear, left     Scoliosis     Shoulder pain     LEFT, TORN ROTATOR CUFF S/P FALL    Spinal stenosis       PAST SURGICAL HISTORY:   Past Surgical History:   Procedure Laterality Date    APPENDECTOMY      BILATERAL BREAST REDUCTION Bilateral 08/2015    CATARACT EXTRACTION  03/2015    CHOLECYSTECTOMY WITH INTRAOPERATIVE CHOLANGIOGRAM N/A 3/27/2022    Procedure: CHOLECYSTECTOMY LAPAROSCOPIC INTRAOPERATIVE CHOLANGIOGRAM;  Surgeon: Aiyana Hill MD;  Location: [facility];  Service: General;  Laterality: N/A;     COLONOSCOPY  2015    WNL    ERCP N/A 2022    Procedure: ENDOSCOPIC RETROGRADE CHOLANGIOPANCREATOGRAPHY with sphincterotomy and balloon sweep;  Surgeon: Chilo Wilhelm MD;  Location: [facility] ENDOSCOPY;  Service: Gastroenterology;  Laterality: N/A;  PRE/POST - CBD stones    ERCP N/A 3/28/2022    Procedure: ENDOSCOPIC RETROGRADE CHOLANGIOPANCREATOGRAPHY WITH SPHINCTEROTOMY AND BALLOON SWEEP;  Surgeon: Chilo Wilhelm MD;  Location: [facility] ENDOSCOPY;  Service: Gastroenterology;  Laterality: N/A;  PRE: COMMON DUCT STONE  POST: COMMON DUCT STONE    KYPHOPLASTY      REDUCTION MAMMAPLASTY      TONSILLECTOMY        FAMILY HISTORY:   Family History   Problem Relation Age of Onset    Bone cancer Mother     No Known Problems Father     Heart disease Daughter       SOCIAL HISTORY:   Social History     Tobacco Use    Smoking status: Former     Current packs/day: 0.00     Average packs/day: 1 pack/day for 40.0 years (40.0 ttl pk-yrs)     Types: Cigarettes     Start date:      Quit date:      Years since quittin.3     Passive exposure: Past    Smokeless tobacco: Never   Vaping Use    Vaping status: Never Used   Substance Use Topics    Alcohol use: No    Drug use: No      MEDICATIONS:   No current facility-administered medications on file prior to encounter.     Current Outpatient Medications on File Prior to Encounter   Medication Sig Dispense Refill    allopurinol (ZYLOPRIM) 100 MG tablet Take 1 tablet by mouth Daily. 30 tablet 0    atorvastatin (LIPITOR) 80 MG tablet Take 1 tablet by mouth Every Night. Indications: High Amount of Fats in the Blood 30 tablet 2    bisoprolol (ZEBeta) 10 MG tablet Take 1 tablet by mouth Daily. 30 tablet 0    DULoxetine (CYMBALTA) 30 MG capsule Take 1 capsule by mouth Daily. 90 capsule 3    ferrous sulfate 325 (65 FE) MG tablet Take 1 tablet by mouth Every Other Day.      fluticasone (FLONASE) 50 MCG/ACT nasal spray Administer 2 sprays into the nostril(s) as directed  "by provider Daily. 16 g 11    hydrALAZINE (APRESOLINE) 100 MG tablet Take 1 tablet by mouth Every 8 (Eight) Hours. 90 tablet 0    HYDROcodone-acetaminophen (NORCO)  MG per tablet Take 1 tablet by mouth Every 6 (Six) Hours As Needed for Moderate Pain. 15 tablet 0    hydrOXYzine (ATARAX) 25 MG tablet Take 1 tablet by mouth 3 (Three) Times a Day As Needed for Itching. 30 tablet 0    insulin glargine (LANTUS, SEMGLEE) 100 UNIT/ML injection Inject 40 Units under the skin into the appropriate area as directed Daily. 10 mL 2    Insulin Pen Needle (BD Pen Needle Naila 2nd Gen) 32G X 4 MM misc Use 1 pen needle 4 (Four) Times a Day. 400 each 2    lansoprazole (PREVACID) 30 MG capsule TAKE 1 CAPSULE DAILY 90 capsule 3    levothyroxine (SYNTHROID, LEVOTHROID) 150 MCG tablet Take 1 tablet by mouth Every Morning. 30 tablet 0    pregabalin (LYRICA) 25 MG capsule Take 1 capsule by mouth 2 (Two) Times a Day. 12 capsule 0    sodium bicarbonate 650 MG tablet Take 1 tablet by mouth 3 (Three) Times a Day. 30 tablet 0             ALLERGIES: Sulfa antibiotics   COMPLETE REVIEW OF SYSTEMS:     ENT ROS: negative  Cardiovascular ROS: no chest pain or dyspnea on exertion  Respiratory ROS: no cough, shortness of breath, or wheezing  Gastrointestinal ROS: no abdominal pain, change in bowel habits, or black or bloody stools  Neurological ROS: no TIA or stroke symptoms  Genito-Urinary ROS: no dysuria, trouble voiding, or hematuria  Musculoskeletal ROS: Severe bilateral lower extremity foot pain  Dermatological ROS: negative  Psychological ROS: negative      PHYSICAL EXAM:   Patient Vitals for the past 24 hrs:   BP Temp Temp src Pulse Resp SpO2 Height Weight   05/23/25 0718 139/64 97.6 °F (36.4 °C) Oral 73 16 93 % 172.7 cm (68\") 97.3 kg (214 lb 8.1 oz)   05/23/25 0601 112/89 -- -- 71 -- -- -- --   05/23/25 0531 131/50 -- -- 68 -- 93 % -- --   05/23/25 0501 137/55 -- -- 68 -- 94 % -- --   05/23/25 0431 148/60 -- -- 66 -- 93 % -- --   05/23/25 " "0246 133/53 97.7 °F (36.5 °C) -- 68 20 94 % -- --   05/23/25 0245 -- -- -- -- -- -- 172.7 cm (68\") 113 kg (250 lb)        General appearance: anxious, ill-appearing, chronically ill appearing, and in severe pain.  Neurological exam reveals alert, oriented, normal speech, no focal findings or movement disorder noted.  ENT exam reveals - ENT exam normal, no neck nodes or sinus tenderness.  CVS exam: normal rate, regular rhythm, normal S1, S2, no murmurs, rubs, clicks or gallops.  Chest: clear to auscultation, no wheezes, rales or rhonchi, symmetric air entry.  Abdominal exam: soft, nontender, nondistended, no masses or organomegaly.  Examination of the feet reveals moderate edema with ruborous changes to both feet with cellulitis in the left foot with open draining wounds at all toe level..        LABS:      Results Review:       I reviewed the patient's new clinical results.  Results from last 7 days   Lab Units 05/23/25  0740 05/23/25  0304   WBC 10*3/mm3 13.26* 12.99*   HEMOGLOBIN g/dL 7.4* 8.4*   PLATELETS 10*3/mm3 392 404     Results from last 7 days   Lab Units 05/23/25  0304   SODIUM mmol/L 138   POTASSIUM mmol/L 4.5   CHLORIDE mmol/L 104   CO2 mmol/L 16.0*   BUN mg/dL 84*   CREATININE mg/dL 4.85*   GLUCOSE mg/dL 137*   Estimated Creatinine Clearance: 10.5 mL/min (A) (by C-G formula based on SCr of 4.85 mg/dL (H)).  Results from last 7 days   Lab Units 05/23/25  0304   CALCIUM mg/dL 8.1*   ALBUMIN g/dL 3.1*     Results from last 7 days   Lab Units 05/23/25  0304   PROTIME Seconds 16.3*   INR  1.31*       The following radiologic or non-invasive studies have been reviewed by me: Vascular testing from 5/8/2025 reviewed with severe lower extremity occlusive disease bilaterally    Active Hospital Problems    Diagnosis  POA    **Cellulitis [L03.90]  Yes    CKD (chronic kidney disease) stage 4, GFR 15-29 ml/min [N18.4]  Yes    Primary malignant neuroendocrine tumor of pancreas [C7A.8]  Yes    Type 2 diabetes mellitus " with hyperglycemia, with long-term current use of insulin [E11.65, Z79.4]  Not Applicable    Benign essential hypertension [I10]  Yes      Resolved Hospital Problems   No resolved problems to display.         ASSESSMENT/PLAN: 84 y.o. female with severe lower extremity peripheral vascular disease that would need reconstruction to allow perfusion to improve.  This would require contrasted studies send determine options and then procedures probably also about any contrast.  Current creatinine is 4.85.  She would likely be at high risk for dialysis based on this.  She refuses dialysis and refuses surgery and does not want anything done.  She states she is ready to go and wishes for palliation.  This is an end-of-life situation and control of ischemic rest pain is extremely difficult due to the nature of the pain.  We will ask palliative care to get involved and take over her care.      I discussed the plan with the patient and she is agreeable to the plan of care at this point. Thank you for this consult.     Lewis Doss MD   05/23/25

## 2025-05-23 NOTE — H&P
Patient Name:  Halie Guidry  YOB: 1940  MRN:  [id number]  Admit Date:  5/23/2025  Patient Care Team:  Provider, No Known as PCP - General  Lilia Orona APRN as Nurse Practitioner (Nurse Practitioner)  Beena Laguerre APRN as Referring Physician (Gastroenterology)  Napoleon Gutierrez MD as Consulting Physician (Hematology and Oncology)  Stefan Gutierrez MD as Consulting Physician (Pain Medicine)  Merlyn Mon APRN as Nurse Practitioner (Nurse Practitioner)  Christina Wells APRN as Nurse Practitioner (Nephrology)  Carol Smiley, PAUL as Ambulatory  ([facility])      Subjective   History Present Illness     Chief Complaint   Patient presents with    Leg Swelling       Ms. Guidry is a 84 y.o. former smoker with a history of hypertension, diabetes mellitus type 2, peripheral vascular disease, CKD stage IV, hypothyroidism who presents to [facility] complaining of leg swelling.    Recent hospital admission approximately 1 week ago for vascular leg pain.  At that time, vascular requested CTA and patient refused due to advanced CKD with fear of needing dialysis.  Palliative care discussed comfort measures at that time.    In ER, patient presented with purulent drainage from BLE & pulses detected on Doppler.  ER provider initiated Zosyn and vancomycin.    Recommendation pending hospital course.  Details below.    History of Present Illness    Review of Systems   Constitutional:  Negative for chills and fever.   HENT:  Negative for congestion and rhinorrhea.    Respiratory:  Negative for cough and shortness of breath.    Cardiovascular:  Positive for leg swelling. Negative for chest pain.   Gastrointestinal:  Negative for abdominal pain, constipation, diarrhea, nausea and vomiting.   Endocrine: Negative for polydipsia, polyphagia and polyuria.   Genitourinary:  Negative for difficulty urinating and dysuria.   Musculoskeletal:  Positive for gait problem  (due to BLE pain) and myalgias (BLE L>R).   Skin:  Positive for color change (BLE) and wound (BLE, L>R).   Neurological:  Negative for syncope and headaches.   Psychiatric/Behavioral:  Negative for confusion and hallucinations.         Personal History     Past Medical History:   Diagnosis Date    Acute metabolic encephalopathy 06/24/2022    Anxiety     Arthritis     Benign essential hypertension 08/20/2014    Bleeding disorder     Depression     Diabetes     Diabetes mellitus, type 2     Disc degeneration, lumbar     Headache, tension-type     Hyperlipidemia     Hypothyroidism     Neuropathy     Osteoporosis 09/09/2015    Pancreatic mass     Sept 2023, on Monthly Octreotide Injection    Peripheral neuropathy     Rotator cuff tear, left     Scoliosis     Shoulder pain     LEFT, TORN ROTATOR CUFF S/P FALL    Spinal stenosis      Past Surgical History:   Procedure Laterality Date    APPENDECTOMY      BILATERAL BREAST REDUCTION Bilateral 08/2015    CATARACT EXTRACTION  03/2015    CHOLECYSTECTOMY WITH INTRAOPERATIVE CHOLANGIOGRAM N/A 3/27/2022    Procedure: CHOLECYSTECTOMY LAPAROSCOPIC INTRAOPERATIVE CHOLANGIOGRAM;  Surgeon: Aiyana Hill MD;  Location: [facility] OR;  Service: General;  Laterality: N/A;    COLONOSCOPY  06/05/2015    WNL    ERCP N/A 2/25/2022    Procedure: ENDOSCOPIC RETROGRADE CHOLANGIOPANCREATOGRAPHY with sphincterotomy and balloon sweep;  Surgeon: Chilo Wilhelm MD;  Location: [facility] ENDOSCOPY;  Service: Gastroenterology;  Laterality: N/A;  PRE/POST - CBD stones    ERCP N/A 3/28/2022    Procedure: ENDOSCOPIC RETROGRADE CHOLANGIOPANCREATOGRAPHY WITH SPHINCTEROTOMY AND BALLOON SWEEP;  Surgeon: Chilo Wilhelm MD;  Location: [facility] ENDOSCOPY;  Service: Gastroenterology;  Laterality: N/A;  PRE: COMMON DUCT STONE  POST: COMMON DUCT STONE    KYPHOPLASTY      REDUCTION MAMMAPLASTY      TONSILLECTOMY       Family History   Problem Relation Age of Onset    Bone cancer Mother     No Known  Problems Father     Heart disease Daughter      Social History     Tobacco Use    Smoking status: Former     Current packs/day: 0.00     Average packs/day: 1 pack/day for 40.0 years (40.0 ttl pk-yrs)     Types: Cigarettes     Start date:      Quit date:      Years since quittin.3     Passive exposure: Past    Smokeless tobacco: Never   Vaping Use    Vaping status: Never Used   Substance Use Topics    Alcohol use: No    Drug use: No     No current facility-administered medications on file prior to encounter.     Current Outpatient Medications on File Prior to Encounter   Medication Sig Dispense Refill    allopurinol (ZYLOPRIM) 100 MG tablet Take 1 tablet by mouth Daily. 30 tablet 0    atorvastatin (LIPITOR) 80 MG tablet Take 1 tablet by mouth Every Night. Indications: High Amount of Fats in the Blood 30 tablet 2    bisoprolol (ZEBeta) 10 MG tablet Take 1 tablet by mouth Daily. 30 tablet 0    DULoxetine (CYMBALTA) 30 MG capsule Take 1 capsule by mouth Daily. 90 capsule 3    ferrous sulfate 325 (65 FE) MG tablet Take 1 tablet by mouth Every Other Day.      fluticasone (FLONASE) 50 MCG/ACT nasal spray Administer 2 sprays into the nostril(s) as directed by provider Daily. 16 g 11    hydrALAZINE (APRESOLINE) 100 MG tablet Take 1 tablet by mouth Every 8 (Eight) Hours. 90 tablet 0    HYDROcodone-acetaminophen (NORCO)  MG per tablet Take 1 tablet by mouth Every 6 (Six) Hours As Needed for Moderate Pain. 15 tablet 0    hydrOXYzine (ATARAX) 25 MG tablet Take 1 tablet by mouth 3 (Three) Times a Day As Needed for Itching. 30 tablet 0    insulin glargine (LANTUS, SEMGLEE) 100 UNIT/ML injection Inject 40 Units under the skin into the appropriate area as directed Daily. 10 mL 2    Insulin Pen Needle (BD Pen Needle Naila 2nd Gen) 32G X 4 MM misc Use 1 pen needle 4 (Four) Times a Day. 400 each 2    lansoprazole (PREVACID) 30 MG capsule TAKE 1 CAPSULE DAILY 90 capsule 3    levothyroxine (SYNTHROID, LEVOTHROID) 150  MCG tablet Take 1 tablet by mouth Every Morning. 30 tablet 0    pregabalin (LYRICA) 25 MG capsule Take 1 capsule by mouth 2 (Two) Times a Day. 12 capsule 0    sodium bicarbonate 650 MG tablet Take 1 tablet by mouth 3 (Three) Times a Day. 30 tablet 0     Allergies   Allergen Reactions    Sulfa Antibiotics Unknown - High Severity     Pt says it was a long time ago and I don't remember but it wasn't good.        Objective    Objective     Vital Signs  Temp:  [97.2 °F (36.2 °C)-97.7 °F (36.5 °C)] 97.2 °F (36.2 °C)  Heart Rate:  [66-75] 75  Resp:  [16-20] 18  BP: (112-148)/(50-89) 135/67  SpO2:  [93 %-94 %] 94 %  on   ;   Device (Oxygen Therapy): room air  Body mass index is 32.62 kg/m².    Physical Exam  Constitutional:       General: She is not in acute distress.     Appearance: She is obese. She is ill-appearing (Chronic appearance). She is not toxic-appearing.      Comments: Generally weak and lethargic   HENT:      Head: Normocephalic and atraumatic.   Eyes:      Extraocular Movements: Extraocular movements intact.      Conjunctiva/sclera: Conjunctivae normal.   Cardiovascular:      Rate and Rhythm: Normal rate and regular rhythm.   Pulmonary:      Effort: Pulmonary effort is normal.      Comments: Diminished on expiration anteriorly  Abdominal:      General: Bowel sounds are normal.      Palpations: Abdomen is soft.   Musculoskeletal:         General: Tenderness (BLE L>R) present.      Cervical back: Normal range of motion and neck supple.      Right lower leg: Edema present.      Left lower leg: Edema present.   Skin:     General: Skin is warm and dry.      Findings: Erythema (BLE L>R) present.   Neurological:      General: No focal deficit present.      Mental Status: She is alert. Mental status is at baseline.   Psychiatric:         Behavior: Behavior normal.         Thought Content: Thought content normal.         Results Review:  I reviewed the patient's new clinical results.  I reviewed the patient's new  imaging results and agree with the interpretation.  I reviewed the patient's other test results and agree with the interpretation  I personally viewed and interpreted the patient's EKG/Telemetry data  Discussed with ED provider.    Lab Results (last 24 hours)       Procedure Component Value Units Date/Time    CBC & Differential [[id number]]  (Abnormal) Collected: 05/23/25 0304    Specimen: Blood Updated: 05/23/25 0315    Narrative:      The following orders were created for panel order CBC & Differential.  Procedure                               Abnormality         Status                     ---------                               -----------         ------                     CBC Auto Differential[[id number]]        Abnormal            Final result                 Please view results for these tests on the individual orders.    Comprehensive Metabolic Panel [[id number]]  (Abnormal) Collected: 05/23/25 0304    Specimen: Blood Updated: 05/23/25 0344     Glucose 137 mg/dL      BUN 84 mg/dL      Creatinine 4.85 mg/dL      Sodium 138 mmol/L      Potassium 4.5 mmol/L      Chloride 104 mmol/L      CO2 16.0 mmol/L      Calcium 8.1 mg/dL      Total Protein 8.0 g/dL      Albumin 3.1 g/dL      ALT (SGPT) 6 U/L      AST (SGOT) 12 U/L      Alkaline Phosphatase 126 U/L      Total Bilirubin 0.2 mg/dL      Globulin 4.9 gm/dL      A/G Ratio 0.6 g/dL      BUN/Creatinine Ratio 17.3     Anion Gap 18.0 mmol/L      eGFR 8.4 mL/min/1.73     Narrative:      GFR Categories in Chronic Kidney Disease (CKD)              GFR Category          GFR (mL/min/1.73)    Interpretation  G1                    90 or greater        Normal or high (1)  G2                    60-89                Mild decrease (1)  G3a                   45-59                Mild to moderate decrease  G3b                   30-44                Moderate to severe decrease  G4                    15-29                Severe decrease  G5                    14 or less            Kidney failure    (1)In the absence of evidence of kidney disease, neither GFR category G1 or G2 fulfill the criteria for CKD.    eGFR calculation 2021 CKD-EPI creatinine equation, which does not include race as a factor    Protime-INR [[id number]]  (Abnormal) Collected: 05/23/25 0304    Specimen: Blood Updated: 05/23/25 0333     Protime 16.3 Seconds      INR 1.31    aPTT [[id number]]  (Normal) Collected: 05/23/25 0304    Specimen: Blood Updated: 05/23/25 0333     PTT 34.0 seconds     CBC Auto Differential [[id number]]  (Abnormal) Collected: 05/23/25 0304    Specimen: Blood Updated: 05/23/25 0315     WBC 12.99 10*3/mm3      RBC 3.15 10*6/mm3      Hemoglobin 8.4 g/dL      Hematocrit 27.2 %      MCV 86.3 fL      MCH 26.7 pg      MCHC 30.9 g/dL      RDW 15.4 %      RDW-SD 48.6 fl      MPV 9.3 fL      Platelets 404 10*3/mm3      Neutrophil % 82.5 %      Lymphocyte % 6.8 %      Monocyte % 6.5 %      Eosinophil % 2.9 %      Basophil % 0.8 %      Immature Grans % 0.5 %      Neutrophils, Absolute 10.72 10*3/mm3      Lymphocytes, Absolute 0.88 10*3/mm3      Monocytes, Absolute 0.85 10*3/mm3      Eosinophils, Absolute 0.38 10*3/mm3      Basophils, Absolute 0.10 10*3/mm3      Immature Grans, Absolute 0.06 10*3/mm3      nRBC 0.0 /100 WBC     Blood Culture - Blood, Hand, Right [[id number]] Collected: 05/23/25 0358    Specimen: Blood from Hand, Right Updated: 05/23/25 0408    Blood Culture - Blood, Hand, Left [[id number]] Collected: 05/23/25 0359    Specimen: Blood from Hand, Left Updated: 05/23/25 0408    POC Glucose Once [[id number]]  (Abnormal) Collected: 05/23/25 0724    Specimen: Blood Updated: 05/23/25 0726     Glucose 144 mg/dL     Basic Metabolic Panel [[id number]]  (Abnormal) Collected: 05/23/25 0740    Specimen: Blood Updated: 05/23/25 0817     Glucose 134 mg/dL      BUN 83 mg/dL      Creatinine 4.82 mg/dL      Sodium 139 mmol/L      Potassium 4.5 mmol/L      Chloride 105 mmol/L      CO2 20.0 mmol/L      Calcium 7.7 mg/dL       BUN/Creatinine Ratio 17.2     Anion Gap 14.0 mmol/L      eGFR 8.4 mL/min/1.73     Narrative:      GFR Categories in Chronic Kidney Disease (CKD)              GFR Category          GFR (mL/min/1.73)    Interpretation  G1                    90 or greater        Normal or high (1)  G2                    60-89                Mild decrease (1)  G3a                   45-59                Mild to moderate decrease  G3b                   30-44                Moderate to severe decrease  G4                    15-29                Severe decrease  G5                    14 or less           Kidney failure    (1)In the absence of evidence of kidney disease, neither GFR category G1 or G2 fulfill the criteria for CKD.    eGFR calculation 2021 CKD-EPI creatinine equation, which does not include race as a factor    CBC (No Diff) [[id number]]  (Abnormal) Collected: 05/23/25 0740    Specimen: Blood Updated: 05/23/25 0759     WBC 13.26 10*3/mm3      RBC 2.74 10*6/mm3      Hemoglobin 7.4 g/dL      Hematocrit 22.6 %      MCV 82.5 fL      MCH 27.0 pg      MCHC 32.7 g/dL      RDW 15.4 %      RDW-SD 45.6 fl      MPV 9.3 fL      Platelets 392 10*3/mm3     Hemoglobin A1c [[id number]]  (Abnormal) Collected: 05/23/25 0740    Specimen: Blood Updated: 05/23/25 1116     Hemoglobin A1C 7.50 %     Narrative:      Hemoglobin A1C Ranges:    Increased Risk for Diabetes  5.7% to 6.4%  Diabetes                     >= 6.5%  Diabetic Goal                < 7.0%    [facility] PCR - Swab, Axilla Right, Axilla Left and Groin [[id number]] Collected: 05/23/25 0954    Specimen: Swab from Axilla Right, Axilla Left and Groin Updated: 05/23/25 1004            Imaging Results (Last 24 Hours)       ** No results found for the last 24 hours. **            Results for orders placed during the hospital encounter of 05/07/25    Adult Transthoracic Echo Complete W/ Cont if Necessary Per Protocol    Interpretation Summary    Left ventricular ejection fraction appears to be  56 - 60%.    Left ventricular diastolic function was normal.      No orders to display        Assessment/Plan     Active Hospital Problems    Diagnosis  POA    **Cellulitis [L03.90]  Yes    CKD (chronic kidney disease) stage 4, GFR 15-29 ml/min [N18.4]  Yes    Anemia due to chronic kidney disease [N18.9, D63.1]  Yes    Primary malignant neuroendocrine tumor of pancreas [C7A.8]  Yes    Type 2 diabetes mellitus with hyperglycemia, with long-term current use of insulin [E11.65, Z79.4]  Not Applicable    Benign essential hypertension [I10]  Yes      Resolved Hospital Problems   No resolved problems to display.       Ms. Guidry is a 84 y.o. former smoker with a history of hypertension, diabetes mellitus type 2, peripheral vascular disease, CKD stage IV, hypothyroidism who presents to [facility] complaining of leg swelling.      Cellulitis, BLE     Bilateral severe ischemic rest pain lower extremities  - Zosyn and vancomycin per pharmacy dosing continued pending blood culture results  -ordering SC heparin for now given frequent immobility increasing risk for DVT  -Therapies consulted  -Vascular consulted and noted patient's previous refusal for contrast study still true given high risk for dialysis plus refusal of surgery and agrees with plan for palliative care      Type 2 diabetes mellitus with hyperglycemia, with long-term current use of insulin  -A1c 7.5 here  -Glucose acceptable trends here  -Plan to continue Lantus 20 units twice daily for now with low-dose correctional sliding scale  -Monitor NCS dietary tolerance & glucose trends      Benign essential hypertension  -BP acceptable acutely  - Plan to continue home regimen--bisoprolol 10 mg p.o. daily & hydralazine 100 mg 3 times a day; monitor BP trends      Primary malignant neuroendocrine tumor of pancreas  -Consult palliative care  -Multiple oncology appointments canceled per patient per chart review      Anemia due to chronic kidney disease     CKD (chronic kidney disease) stage 4, GFR 15-29 ml/min  -Consult nephrology given worsening renal function since 5/16/2025  -Continue sodium bicarb 3 times daily; repeat labs in a.m.  -Suspect anemia due to in part to cellulitis / wounds of lower extremities   -Continue ferrous sulfate; CBC in a.m.    I discussed the patient's findings and my recommendations with patient and nursing staff & Dr. Ivory.    VTE Prophylaxis - Heparin SC.  Code Status - Full code.  Confirmed with patient today       SERGE Fernando  [facility]  05/23/25  13:42 EDT

## 2025-05-23 NOTE — ED NOTES
"Nursing report ED to floor  Halie Guidry  84 y.o.  female    HPI :  HPI  Stated Reason for Visit: pt to ed via ems from home and reports leg swelling with open wounds and weeping. pt reports she became increasingly weak and was unable to ambulate or transfer. pt weeping yellow malodorous fluid from bilateral lower extremities.    Chief Complaint  Chief Complaint   Patient presents with    Leg Swelling       Admitting doctor:   Linda Barrera DO    Admitting diagnosis:   The primary encounter diagnosis was Cellulitis of lower extremity, unspecified laterality. Diagnoses of PVD (peripheral vascular disease), VIPUL (acute kidney injury), and Chronic kidney disease, unspecified CKD stage were also pertinent to this visit.    Code status:   Current Code Status       Date Active Code Status Order ID Comments User Context       5/23/2025 0522 CPR (Attempt to Resuscitate) [id number]  Beena Morgan APRN ED        Question Answer    Code Status (Patient has no pulse and is not breathing) CPR (Attempt to Resuscitate)    Medical Interventions (Patient has pulse or is breathing) Full Support                    Allergies:   Sulfa antibiotics    Isolation:   Contact    Intake and Output    Intake/Output Summary (Last 24 hours) at 5/23/2025 0522  Last data filed at 5/23/2025 0457  Gross per 24 hour   Intake 100 ml   Output --   Net 100 ml       Weight:       05/23/25  0245   Weight: 113 kg (250 lb)       Most recent vitals:   Vitals:    05/23/25 0245 05/23/25 0246 05/23/25 0431   BP:  133/53 148/60   Pulse:  68 66   Resp:  20    Temp:  97.7 °F (36.5 °C)    SpO2:  94% 93%   Weight: 113 kg (250 lb)     Height: 172.7 cm (68\")         Active LDAs/IV Access:   Lines, Drains & Airways       Active LDAs       Name Placement date Placement time Site Days    Peripheral IV 05/23/25 0308 20 G Anterior;Right Forearm 05/23/25 0308  Forearm  less than 1    External Urinary Catheter 05/23/25  0347  --  less than 1              "       Labs (abnormal labs have a star):   Labs Reviewed   COMPREHENSIVE METABOLIC PANEL - Abnormal; Notable for the following components:       Result Value    Glucose 137 (*)     BUN 84 (*)     Creatinine 4.85 (*)     CO2 16.0 (*)     Calcium 8.1 (*)     Albumin 3.1 (*)     Alkaline Phosphatase 126 (*)     Anion Gap 18.0 (*)     eGFR 8.4 (*)     All other components within normal limits    Narrative:     GFR Categories in Chronic Kidney Disease (CKD)              GFR Category          GFR (mL/min/1.73)    Interpretation  G1                    90 or greater        Normal or high (1)  G2                    60-89                Mild decrease (1)  G3a                   45-59                Mild to moderate decrease  G3b                   30-44                Moderate to severe decrease  G4                    15-29                Severe decrease  G5                    14 or less           Kidney failure    (1)In the absence of evidence of kidney disease, neither GFR category G1 or G2 fulfill the criteria for CKD.    eGFR calculation 2021 CKD-EPI creatinine equation, which does not include race as a factor   PROTIME-INR - Abnormal; Notable for the following components:    Protime 16.3 (*)     INR 1.31 (*)     All other components within normal limits   CBC WITH AUTO DIFFERENTIAL - Abnormal; Notable for the following components:    WBC 12.99 (*)     RBC 3.15 (*)     Hemoglobin 8.4 (*)     Hematocrit 27.2 (*)     MCHC 30.9 (*)     Neutrophil % 82.5 (*)     Lymphocyte % 6.8 (*)     Neutrophils, Absolute 10.72 (*)     Immature Grans, Absolute 0.06 (*)     All other components within normal limits   APTT - Normal   BLOOD CULTURE   BLOOD CULTURE   BASIC METABOLIC PANEL   CBC (NO DIFF)   CBC AND DIFFERENTIAL    Narrative:     The following orders were created for panel order CBC & Differential.  Procedure                               Abnormality         Status                     ---------                                -----------         ------                     CBC Auto Differential[[id number]]        Abnormal            Final result                 Please view results for these tests on the individual orders.       EKG:   No orders to display       Meds given in ED:   Medications   sodium chloride 0.9 % flush 10 mL (has no administration in time range)   vancomycin 2250 mg/500 mL 0.9% NS IVPB ([facility]) (2,250 mg Intravenous New Bag 5/23/25 5629)   sodium chloride 0.9 % flush 10 mL (has no administration in time range)   sodium chloride 0.9 % flush 10 mL (has no administration in time range)   sodium chloride 0.9 % infusion 40 mL (has no administration in time range)   acetaminophen (TYLENOL) tablet 650 mg (has no administration in time range)     Or   acetaminophen (TYLENOL) 160 MG/5ML oral solution 650 mg (has no administration in time range)     Or   acetaminophen (TYLENOL) suppository 650 mg (has no administration in time range)   sennosides-docusate (PERICOLACE) 8.6-50 MG per tablet 2 tablet (has no administration in time range)     And   polyethylene glycol (MIRALAX) packet 17 g (has no administration in time range)     And   bisacodyl (DULCOLAX) EC tablet 5 mg (has no administration in time range)     And   bisacodyl (DULCOLAX) suppository 10 mg (has no administration in time range)   ondansetron ODT (ZOFRAN-ODT) disintegrating tablet 4 mg (has no administration in time range)     Or   ondansetron (ZOFRAN) injection 4 mg (has no administration in time range)   calcium carbonate (TUMS) chewable tablet 500 mg (200 mg elemental) (has no administration in time range)   HYDROmorphone (DILAUDID) injection 0.5 mg (has no administration in time range)   Pharmacy to dose vancomycin (has no administration in time range)   Pharmacy To Dose: Piperacillin-tazobactam (Zosyn) (has no administration in time range)   piperacillin-tazobactam (ZOSYN) 3.375 g IVPB in 100 mL NS MBP (CD) (0 g Intravenous Stopped 5/23/25 1185)   HYDROmorphone  (DILAUDID) injection 0.5 mg (0.5 mg Intravenous Given 25 7842)   ondansetron (ZOFRAN) injection 4 mg (4 mg Intravenous Given 25 8973)   sodium chloride 0.9 % bolus 500 mL (500 mL Intravenous New Bag 25 0258)       Imaging results:  No radiology results for the last day    Ambulatory status:   - Assist x2    Social issues:   Social History     Socioeconomic History    Marital status:     Number of children: 3   Tobacco Use    Smoking status: Former     Current packs/day: 0.00     Average packs/day: 1 pack/day for 40.0 years (40.0 ttl pk-yrs)     Types: Cigarettes     Start date:      Quit date:      Years since quittin.3     Passive exposure: Past    Smokeless tobacco: Never   Vaping Use    Vaping status: Never Used   Substance and Sexual Activity    Alcohol use: No    Drug use: No    Sexual activity: Defer       Peripheral Neurovascular  Peripheral Neurovascular (Adult)  Peripheral Neurovascular WDL: .WDL except, pulse assessment  Pulse Assessment: dorsalis pedis  Additional Documentation: Edema (Group)  Edema  Edema: leg, left, leg, right  Leg, Left Edema: 3+ (Moderate)  Leg, Right Edema: 3+ (Moderate)    Neuro Cognitive  Neuro Cognitive (Adult)  Cognitive/Neuro/Behavioral WDL: WDL, orientation  Orientation: oriented x 4    Learning  Learning Assessment  Learning Readiness and Ability: emotional barrier identified  Education Provided  Person Taught: patient  Teaching Method: verbal instruction  Teaching Focus: symptom/problem overview  Education Outcome Evaluation: verbalizes understanding    Respiratory  Respiratory  Airway WDL: WDL  Respiratory WDL  Respiratory WDL: WDL    Abdominal Pain       Pain Assessments  Pain (Adult)  (0-10) Pain Rating: Rest: 7  (0-10) Pain Rating: Activity: 10  Patient requested Medication Prescribed for Lower Pain Scale: Yes  Pain Location: extremity  Pain Side/Orientation: bilateral  Response to Pain Interventions: interventions effective per  patient    NIH Stroke Scale       Leslie Jackson RN  05/23/25 05:22 EDT

## 2025-05-23 NOTE — NURSING NOTE
CWOCN- noted consult. Reviewed chart.   Dr Doss has seen patient this am for her BLE. He notes that this is an end of life situation. Patient does not wish for intervention but for palliative care. Legs are painful and Dr Doss has suggested that the palliative team address her pain and end of life care.     Open areas to BLE are moist, yellow with serosanguinous drainage/weeping. Legs dry/scaly, painful. Recommend to cleanse with foam cleanser and apply vaseline gauze + abd + roll gauze lightly. Change every other day + PRN for the drainage. If there is a copious amount of drainage- d/c the wraps and just place absorbent pads around the leg to wick the moisture better. With lower extremity ischemia, patient at risk for heel ulcerations- recommend the Allevyn Life heel dressings be applied if skin is not to moist. Off load heels.    Patient documented to have a stage 1 injury to the coccyx per consult. Recommend pressure injury prevention measures such as the sacral dressing. If patient having loose bm, then apply barrier ointment. Ongoing pressure injury prevention.     Please reconsult WOC as needed.

## 2025-05-23 NOTE — PROGRESS NOTES
"[facility] Clinical Pharmacy Services: Vancomycin Pharmacokinetic Initial Consult Note    Halie Guidry is a 84 y.o. female who is on day 1 of pharmacy to dose vancomycin.    Indication: Skin and Soft Tissue  Consulting Provider: SERGE Conley  Planned Duration of Therapy: 5 days  Loading Dose Ordered or Given: 2250 mg on 5/23 at 0501  Culture/Source:   5/23 blood cultures in process  Target: Dose by Levels  Other Antimicrobials: Zosyn 3.375 g iv every 12 hours    Vitals/Labs  Ht: 172.7 cm (68\"); Wt: 113 kg (250 lb)  Temp Readings from Last 1 Encounters:   05/23/25 97.7 °F (36.5 °C)    Estimated Creatinine Clearance: 11.4 mL/min (A) (by C-G formula based on SCr of 4.85 mg/dL (H)).     Results from last 7 days   Lab Units 05/23/25  0304 05/16/25  0546   CREATININE mg/dL 4.85* 3.50*   WBC 10*3/mm3 12.99* 11.52*     Assessment/Plan:    Vancomycin Dose:   2250 mg IV once  Vanc Random has been ordered for 5/23 at 1700 due to VIPUL     Pharmacy will follow patient's kidney function and will adjust doses and obtain levels as necessary. Thank you for involving pharmacy in this patient's care. Please contact pharmacy with any questions or concerns.                           Calvin Tate III, [facility]  Clinical Pharmacist    "

## 2025-05-23 NOTE — CONSULTS
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  At that time patient continued to wish not to have dialysis no matter what.  She Luanne presented with severe pain in her legs.  Creatinine was up to 4.82 she was taking couple nonsteroidals a day even though she states she did not 1 supposed to and she was just having a lot of pain and she just needed to be treated.  She does have chronic kidney disease related to diabetes as well as a history of hypertension history of peripheral vascular disease.  Patient currently is having mainly a large complaint of severe pain.  Denies any chest pain shortness of breath.  Has decreased appetite and decreased energy.    Past Medical History:   Diagnosis Date    Acute metabolic encephalopathy 2022    Anxiety     Arthritis     Benign essential hypertension 2014    Bleeding disorder     Depression     Diabetes     Diabetes mellitus, type 2     Disc degeneration, lumbar     Headache, tension-type     Hyperlipidemia     Hypothyroidism     Neuropathy     Osteoporosis 2015    Pancreatic mass     2023, on Monthly Octreotide Injection    Peripheral neuropathy     Rotator cuff tear, left     Scoliosis     Shoulder pain     LEFT, TORN ROTATOR CUFF S/P FALL    Spinal stenosis      Social History     Socioeconomic History    Marital status:     Number of children: 3   Tobacco Use    Smoking status: Former     Current packs/day: 0.00     Average packs/day: 1 pack/day for 40.0 years (40.0 ttl pk-yrs)     Types: Cigarettes     Start date:      Quit date:      Years since quittin.3     Passive exposure: Past    Smokeless tobacco: Never   Vaping Use    Vaping status: Never Used   Substance and Sexual Activity    Alcohol use: No    Drug use:  No    Sexual activity: Defer     No current facility-administered medications on file prior to encounter.     Current Outpatient Medications on File Prior to Encounter   Medication Sig Dispense Refill    allopurinol (ZYLOPRIM) 100 MG tablet Take 1 tablet by mouth Daily. 30 tablet 0    atorvastatin (LIPITOR) 80 MG tablet Take 1 tablet by mouth Every Night. Indications: High Amount of Fats in the Blood 30 tablet 2    bisoprolol (ZEBeta) 10 MG tablet Take 1 tablet by mouth Daily. 30 tablet 0    DULoxetine (CYMBALTA) 30 MG capsule Take 1 capsule by mouth Daily. 90 capsule 3    ferrous sulfate 325 (65 FE) MG tablet Take 1 tablet by mouth Every Other Day.      fluticasone (FLONASE) 50 MCG/ACT nasal spray Administer 2 sprays into the nostril(s) as directed by provider Daily. 16 g 11    hydrALAZINE (APRESOLINE) 100 MG tablet Take 1 tablet by mouth Every 8 (Eight) Hours. 90 tablet 0    HYDROcodone-acetaminophen (NORCO)  MG per tablet Take 1 tablet by mouth Every 6 (Six) Hours As Needed for Moderate Pain. 15 tablet 0    hydrOXYzine (ATARAX) 25 MG tablet Take 1 tablet by mouth 3 (Three) Times a Day As Needed for Itching. 30 tablet 0    insulin glargine (LANTUS, SEMGLEE) 100 UNIT/ML injection Inject 40 Units under the skin into the appropriate area as directed Daily. 10 mL 2    Insulin Pen Needle (BD Pen Needle Naila 2nd Gen) 32G X 4 MM misc Use 1 pen needle 4 (Four) Times a Day. 400 each 2    lansoprazole (PREVACID) 30 MG capsule TAKE 1 CAPSULE DAILY 90 capsule 3    levothyroxine (SYNTHROID, LEVOTHROID) 150 MCG tablet Take 1 tablet by mouth Every Morning. 30 tablet 0    pregabalin (LYRICA) 25 MG capsule Take 1 capsule by mouth 2 (Two) Times a Day. 12 capsule 0    sodium bicarbonate 650 MG tablet Take 1 tablet by mouth 3 (Three) Times a Day. 30 tablet 0      atorvastatin, 80 mg, Oral, Nightly  bisoprolol, 10 mg, Oral, Daily  ferrous sulfate, 325 mg, Oral, Every Other Day  fluticasone, 2 spray, Nasal, Daily  heparin  "(porcine), 5,000 Units, Subcutaneous, Q12H  hydrALAZINE, 100 mg, Oral, Q8H  insulin glargine, 20 Units, Subcutaneous, Q12H  insulin lispro, 2-7 Units, Subcutaneous, 4x Daily AC & at Bedtime  levothyroxine, 150 mcg, Oral, Q AM  pantoprazole, 40 mg, Oral, Q AM  piperacillin-tazobactam, 3.375 g, Intravenous, Q12H  pregabalin, 25 mg, Oral, BID  sodium bicarbonate, 650 mg, Oral, TID  sodium chloride, 10 mL, Intravenous, Q12H         acetaminophen **OR** acetaminophen **OR** acetaminophen    senna-docusate sodium **AND** polyethylene glycol **AND** bisacodyl **AND** bisacodyl    calcium carbonate    dextrose    dextrose    glucagon (human recombinant)    HYDROcodone-acetaminophen    HYDROmorphone    hydrOXYzine    ondansetron ODT **OR** ondansetron    Pharmacy to dose vancomycin    Pharmacy To Dose:    [COMPLETED] Insert Peripheral IV **AND** sodium chloride    sodium chloride    sodium chloride   Family History   Problem Relation Age of Onset    Bone cancer Mother     No Known Problems Father     Heart disease Daughter        Review of Systems    Physical Exam:  /67 (BP Location: Left arm, Patient Position: Lying)   Pulse 75   Temp 97.2 °F (36.2 °C) (Oral)   Resp 18   Ht 172.7 cm (68\")   Wt 97.3 kg (214 lb 8.1 oz)   SpO2 94%   BMI 32.62 kg/m²     Intake/Output Summary (Last 24 hours) at 5/23/2025 1419  Last data filed at 5/23/2025 0457  Gross per 24 hour   Intake 600 ml   Output --   Net 600 ml      General: Patient alert and oriented, appears uncomfortable HEENT: Normocephalic atraumatic pupils are equal round reactive to light extraocular muscles are intact there appears to be normal mouth is clear no erythema no exudates neck supple no adenopathy  Cardiac: Regular rate and rhythm without a rub no S3 or S4  Lungs: Clear bilaterally no wheezes rhonchi or rales  Abdomen: Bowel sounds positive, nontender soft no mass felt no apparent organomegaly noted  Extremities: Some mild lower extremity swelling no signs " of cysts  Skin: Chronic skin changes of the lower extremities : Deferred  Neuro: Appears intact with motor and sensory grossly    Lab Results (last 24 hours)       Procedure Component Value Units Date/Time    Hemoglobin A1c [[id number]]  (Abnormal) Collected: 05/23/25 0740    Specimen: Blood Updated: 05/23/25 1116     Hemoglobin A1C 7.50 %     Narrative:      Hemoglobin A1C Ranges:    Increased Risk for Diabetes  5.7% to 6.4%  Diabetes                     >= 6.5%  Diabetic Goal                < 7.0%    C[facility] PCR - Swab, Axilla Right, Axilla Left and Groin [[id number]] Collected: 05/23/25 0954    Specimen: Swab from Axilla Right, Axilla Left and Groin Updated: 05/23/25 1004    Basic Metabolic Panel [[id number]]  (Abnormal) Collected: 05/23/25 0740    Specimen: Blood Updated: 05/23/25 0817     Glucose 134 mg/dL      BUN 83 mg/dL      Creatinine 4.82 mg/dL      Sodium 139 mmol/L      Potassium 4.5 mmol/L      Chloride 105 mmol/L      CO2 20.0 mmol/L      Calcium 7.7 mg/dL      BUN/Creatinine Ratio 17.2     Anion Gap 14.0 mmol/L      eGFR 8.4 mL/min/1.73     Narrative:      GFR Categories in Chronic Kidney Disease (CKD)              GFR Category          GFR (mL/min/1.73)    Interpretation  G1                    90 or greater        Normal or high (1)  G2                    60-89                Mild decrease (1)  G3a                   45-59                Mild to moderate decrease  G3b                   30-44                Moderate to severe decrease  G4                    15-29                Severe decrease  G5                    14 or less           Kidney failure    (1)In the absence of evidence of kidney disease, neither GFR category G1 or G2 fulfill the criteria for CKD.    eGFR calculation 2021 CKD-EPI creatinine equation, which does not include race as a factor    CBC (No Diff) [[id number]]  (Abnormal) Collected: 05/23/25 0740    Specimen: Blood Updated: 05/23/25 0759     WBC 13.26 10*3/mm3      RBC 2.74  10*6/mm3      Hemoglobin 7.4 g/dL      Hematocrit 22.6 %      MCV 82.5 fL      MCH 27.0 pg      MCHC 32.7 g/dL      RDW 15.4 %      RDW-SD 45.6 fl      MPV 9.3 fL      Platelets 392 10*3/mm3     POC Glucose Once [[id number]]  (Abnormal) Collected: 05/23/25 0724    Specimen: Blood Updated: 05/23/25 0726     Glucose 144 mg/dL     Blood Culture - Blood, Hand, Right [[id number]] Collected: 05/23/25 0358    Specimen: Blood from Hand, Right Updated: 05/23/25 0408    Blood Culture - Blood, Hand, Left [[id number]] Collected: 05/23/25 0359    Specimen: Blood from Hand, Left Updated: 05/23/25 0408    Comprehensive Metabolic Panel [[id number]]  (Abnormal) Collected: 05/23/25 0304    Specimen: Blood Updated: 05/23/25 0344     Glucose 137 mg/dL      BUN 84 mg/dL      Creatinine 4.85 mg/dL      Sodium 138 mmol/L      Potassium 4.5 mmol/L      Chloride 104 mmol/L      CO2 16.0 mmol/L      Calcium 8.1 mg/dL      Total Protein 8.0 g/dL      Albumin 3.1 g/dL      ALT (SGPT) 6 U/L      AST (SGOT) 12 U/L      Alkaline Phosphatase 126 U/L      Total Bilirubin 0.2 mg/dL      Globulin 4.9 gm/dL      A/G Ratio 0.6 g/dL      BUN/Creatinine Ratio 17.3     Anion Gap 18.0 mmol/L      eGFR 8.4 mL/min/1.73     Narrative:      GFR Categories in Chronic Kidney Disease (CKD)              GFR Category          GFR (mL/min/1.73)    Interpretation  G1                    90 or greater        Normal or high (1)  G2                    60-89                Mild decrease (1)  G3a                   45-59                Mild to moderate decrease  G3b                   30-44                Moderate to severe decrease  G4                    15-29                Severe decrease  G5                    14 or less           Kidney failure    (1)In the absence of evidence of kidney disease, neither GFR category G1 or G2 fulfill the criteria for CKD.    eGFR calculation 2021 CKD-EPI creatinine equation, which does not include race as a factor    Protime-INR [[id number]]   (Abnormal) Collected: 05/23/25 0304    Specimen: Blood Updated: 05/23/25 0333     Protime 16.3 Seconds      INR 1.31    aPTT [[id number]]  (Normal) Collected: 05/23/25 0304    Specimen: Blood Updated: 05/23/25 0333     PTT 34.0 seconds     CBC & Differential [[id number]]  (Abnormal) Collected: 05/23/25 0304    Specimen: Blood Updated: 05/23/25 0315    Narrative:      The following orders were created for panel order CBC & Differential.  Procedure                               Abnormality         Status                     ---------                               -----------         ------                     CBC Auto Differential[[id number]]        Abnormal            Final result                 Please view results for these tests on the individual orders.    CBC Auto Differential [[id number]]  (Abnormal) Collected: 05/23/25 0304    Specimen: Blood Updated: 05/23/25 0315     WBC 12.99 10*3/mm3      RBC 3.15 10*6/mm3      Hemoglobin 8.4 g/dL      Hematocrit 27.2 %      MCV 86.3 fL      MCH 26.7 pg      MCHC 30.9 g/dL      RDW 15.4 %      RDW-SD 48.6 fl      MPV 9.3 fL      Platelets 404 10*3/mm3      Neutrophil % 82.5 %      Lymphocyte % 6.8 %      Monocyte % 6.5 %      Eosinophil % 2.9 %      Basophil % 0.8 %      Immature Grans % 0.5 %      Neutrophils, Absolute 10.72 10*3/mm3      Lymphocytes, Absolute 0.88 10*3/mm3      Monocytes, Absolute 0.85 10*3/mm3      Eosinophils, Absolute 0.38 10*3/mm3      Basophils, Absolute 0.10 10*3/mm3      Immature Grans, Absolute 0.06 10*3/mm3      nRBC 0.0 /100 WBC              Imaging Results (Last 24 Hours)       ** No results found for the last 24 hours. **             Assessment/plan:  1.  Acute on chronic kidney disease stage IV.  Patient's renal function continues to worsen and her GFR now is less than 10 may have some mild uremic symptoms although she is alert and oriented.  Again I had a long discussion with her and she does not want dialysis she understands that she will  "continue to worsen with potential nausea vomiting loss of energy loss of appetite and eventually could fade off into a coma without dialysis she says she understands that if she is ready for \"the Lord to take her in this hands\" she will just wants to be made comfortable she does not want anything else done.  Told her she could think about it and change her mind although she says she is not going to she does not want any dialysis she just wants to be pain-free and passed away.    Will have my partner see her 1 more time tomorrow to make sure this is what her wishes are we will not order any labs.  Everton Bronson MD  "

## 2025-05-24 NOTE — PLAN OF CARE
Goal Outcome Evaluation:  Plan of Care Reviewed With: patient        Progress: no change  Outcome Evaluation: Pt cooperative and confused at times.  Slept most of the shift.  Medicated with norco x1 and 0.5mg dilaudid IV x1 for pain.  Purewick in place.  New IV placed.  Pt appears comfortable at this time.  Will continue with plan of care.

## 2025-05-24 NOTE — PLAN OF CARE
Goal Outcome Evaluation:           Progress: declining  Outcome Evaluation: Pt alert to self, confused to place, time and situation. Pt mostly sleeping throughout the day. Pt had periods of restlessness and pt would yell out in pain if her legs were touched. Pt received norco X 2 and IV dilaudid X 2 for pain. Wound care done to BLE. Purewick in place. Pt assisted with turns, oral care and marquez care. Will continue with comfort care.

## 2025-05-24 NOTE — PROGRESS NOTES
[facility] Clinical Pharmacy Services: Vancomycin Level Monitoring Note    Halie Guidry is a 84 y.o. female who is on day 2/5 of pharmacy to dose vancomycin for Skin and Soft Tissue.    Estimated Creatinine Clearance: 10.6 mL/min (A) (by C-G formula based on SCr of 4.82 mg/dL (H)).    Current Vanc Dose: intermittent dosing  Results from last 7 days   Lab Units 05/24/25  0612 05/23/25  1701   VANCOMYCIN RM mcg/mL 23.10 26.40     Level today which is roughly 24h since prior does is effectively unchanged from prior level obtained last night. She has cleared roughly 3-5mcg/mL in a 13h time frame. No doses planned for today and I doubt she will need one tomorrow either. Pharmacy will follow and determine if another dose/lvl is appropriate tomorrow     Next Level Date and Time: TBD    No changes at this time. Pharmacy is continuing to monitor and will adjust as needed.    Filiberto Barrera [facility]  Clinical Pharmacist

## 2025-05-24 NOTE — SIGNIFICANT NOTE
05/24/25 1125   OTHER   Discipline physical therapist   Rehab Time/Intention   Session Not Performed other (see comments)  (Hold per nsg, awaiting pallative consult. Likely not appropriate for PT at this time. Will follow up tomorrow.)   Recommendation   PT - Next Appointment 05/25/25

## 2025-05-24 NOTE — PROGRESS NOTES
Name: Halie Guidry ADMIT: 2025   : 1940  PCP: Provider, No Known    MRN: [id number] LOS: 1 days   AGE/SEX: 84 y.o. female  ROOM: [facility]     Subjective   Subjective   Patient awake and resting in bed, has complaints of ongoing pain in her lower extremities.       Objective   Objective   Vital Signs  Temp:  [97.1 °F (36.2 °C)-99.7 °F (37.6 °C)] 99.7 °F (37.6 °C)  Heart Rate:  [75-87] 81  Resp:  [16-20] 18  BP: (135-161)/(67-78) 150/69  SpO2:  [93 %-96 %] 93 %  on  Flow (L/min) (Oxygen Therapy):  [2] 2;   Device (Oxygen Therapy): nasal cannula  Body mass index is 32.62 kg/m².  Constitutional: elderly, ill appearing, uncomfortable  CV: Regular rate, regular rhythm  RESP: No rhonchi no wheezes  GI: soft, nontender, nondistended  MSK: Swelling, erythema of lower extremities, with tenderness appreciated bilaterally  Skin: Warm, dry.  Neuro: no facial droop, no tremor, but confused  Results Review     I reviewed the patient's new clinical results.  Results from last 7 days   Lab Units 25  0612 25  0740 25  0304   WBC 10*3/mm3 14.02* 13.26* 12.99*   HEMOGLOBIN g/dL 7.4* 7.4* 8.4*   PLATELETS 10*3/mm3 387 392 404     Results from last 7 days   Lab Units 25  0612 25  0740 25  0304   SODIUM mmol/L 142 139 138   POTASSIUM mmol/L 4.6 4.5 4.5   CHLORIDE mmol/L 110* 105 104   CO2 mmol/L 16.0* 20.0* 16.0*   BUN mg/dL 75* 83* 84*   CREATININE mg/dL 4.72* 4.82* 4.85*   GLUCOSE mg/dL 58* 134* 137*   EGFR mL/min/1.73 8.6* 8.4* 8.4*     Results from last 7 days   Lab Units 25  0304   ALBUMIN g/dL 3.1*   BILIRUBIN mg/dL 0.2   ALK PHOS U/L 126*   AST (SGOT) U/L 12   ALT (SGPT) U/L 6     Results from last 7 days   Lab Units 25  0612 25  0740 25  0304   CALCIUM mg/dL 7.6* 7.7* 8.1*   ALBUMIN g/dL  --   --  3.1*       Hemoglobin A1C   Date/Time Value Ref Range Status   2025 0740 7.50 (H) 4.80 - 5.60 % Final     Glucose   Date/Time Value Ref Range Status    05/24/2025 0743 73 70 - 130 mg/dL Final   05/23/2025 2008 161 (H) 70 - 130 mg/dL Final   05/23/2025 1628 189 (H) 70 - 130 mg/dL Final   05/23/2025 1142 131 (H) 70 - 130 mg/dL Final   05/23/2025 0724 144 (H) 70 - 130 mg/dL Final       No radiology results for the last day    I have personally reviewed all medications:  Scheduled Medications  atorvastatin, 80 mg, Oral, Nightly  bisoprolol, 10 mg, Oral, Daily  ferrous sulfate, 325 mg, Oral, Every Other Day  fluticasone, 2 spray, Nasal, Daily  heparin (porcine), 5,000 Units, Subcutaneous, Q12H  hydrALAZINE, 100 mg, Oral, Q8H  insulin glargine, 20 Units, Subcutaneous, Q12H  insulin lispro, 2-7 Units, Subcutaneous, 4x Daily AC & at Bedtime  levothyroxine, 150 mcg, Oral, Q AM  pantoprazole, 40 mg, Oral, Q AM  piperacillin-tazobactam, 3.375 g, Intravenous, Q12H  pregabalin, 25 mg, Oral, BID  sodium bicarbonate, 650 mg, Oral, TID  sodium chloride, 10 mL, Intravenous, Q12H  Vancomycin Pharmacy Intermittent/Pulse Dosing, , Not Applicable, Daily    Infusions  Pharmacy to dose vancomycin,   Pharmacy To Dose:,     Diet  Diet: Regular/House, Cardiac, Diabetic; Healthy Heart (2-3 Na+); Consistent Carbohydrate; Fluid Consistency: Thin (IDDSI 0)    I have personally reviewed:  [x]  Laboratory   [x]  Microbiology   []  Radiology   []  EKG/Telemetry  []  Cardiology/Vascular   []  Pathology    []  Records       Assessment/Plan     Active Hospital Problems    Diagnosis  POA    **Cellulitis [L03.90]  Yes    Leg pain [M79.606]  Yes    CKD (chronic kidney disease) stage 4, GFR 15-29 ml/min [N18.4]  Yes    Anemia due to chronic kidney disease [N18.9, D63.1]  Yes    Primary malignant neuroendocrine tumor of pancreas [C7A.8]  Yes    Acute kidney injury [N17.9]  Yes    Type 2 diabetes mellitus with hyperglycemia, with long-term current use of insulin [E11.65, Z79.4]  Not Applicable    Benign essential hypertension [I10]  Yes      Resolved Hospital Problems   No resolved problems to display.      84 y.o. female with history of CKD stage 4, peripheral vascular disease, T2DM, HTN, hypothyroidism and other conditions as listed in history that presents to [facility] with complaints of worsening bilateral lower extremity pain that has been constant and progressive in nature, admitted with Cellulitis.     Bilateral lower extremity pain  Peripheral vascular disease  Cellulitis, leukocytosis  - Recent arterial duplex from 05/08 showing severely reduced WILLIS on left and right, waveforms consistent with aorto-iliac disease, severe digital ischemia. WBC count elevated, had associated purulent drainage from lower extremities on arrival. She has been on broad spectrum antibiotics, while further workup ordered-WBC continues to worsen. Vascular consulted and evaluated, she would not want any type of surgical intervention and expressed wishes for palliative care so they have been consulted and assessed. Goal is to maintain patient comfort. [facility] consulted to evaluate. Will readdress with them today and ensure this is still plan and if so can stop antibiotics.   - Continue with PRN medications for pain, can adjust as needed to patient level of comfort.     VIPUL on CKD stage 4/Metabolic acidosis  - Creatinine 4.85 on arrival, baseline ~3.2. CO2 low. Recent renal US reviewed. Nephrology consulted to assess, she stated she would not want dialysis if this required and has expressed wishes for comfort measures.      Type 2 diabetes with hyperglycemia  - A1c 7.50%. Glargine and SSI ordered, which she remains on at present, monitoring glucose levels.      Hypertension  - Trend BP to guide further management.      Anemia  - Closely trend hemoglobin, repeat CBC in AM, transfuse for hemoglobin <7.     Neuroendocrine tumor of pancreas       SCDs for DVT prophylaxis.  Limited code - Comfort measures .  Discussed with patient and nursing staff.  Anticipate discharge  TBD  timing yet to be determined.  Expected  discharge date/ time has not been documented.      Zachariah Ivory DO  [facility]  05/24/25

## 2025-05-24 NOTE — PROGRESS NOTES
[facility] Progress Note      Patient Name: Halie Guidry  : 1940  MRN: [id number]  Primary Care Physician:  Provider, No Known  Date of admission: 2025    Subjective     Interval History:   Reason for follow up: Management of acute kidney injury on top of CKD stage IV.    Chart was reviewed.  Events were noted.    She has not had fever.  Urine output is acceptable.    The patient is sleepy.  She still does not want to have any hemodialysis.  She was evaluated by palliative care team.    Review of Systems:   As noted above    Objective     Vitals:   Temp:  [97.1 °F (36.2 °C)-99.9 °F (37.7 °C)] 99.9 °F (37.7 °C)  Heart Rate:  [75-87] 81  Resp:  [16-20] 18  BP: (135-161)/(55-78) 151/55  Flow (L/min) (Oxygen Therapy):  [2] 2    Intake/Output Summary (Last 24 hours) at 2025 1010  Last data filed at 2025 0906  Gross per 24 hour   Intake 600 ml   Output 900 ml   Net -300 ml       Physical Exam:    General Appearance: Sleepy since she got pain medications today, no acute distress   Skin: warm and dry  HEENT: oral mucosa normal, nonicteric sclera  Neck: supple, no JVD  Lungs: CTA  Heart: RRR, normal S1 and S2  Abdomen: soft, nontender, nondistended  : no palpable bladder  Extremities: no edema, cyanosis or clubbing  Neuro: normal speech and mental status     Scheduled Meds:     atorvastatin, 80 mg, Oral, Nightly  bisoprolol, 10 mg, Oral, Daily  ferrous sulfate, 325 mg, Oral, Every Other Day  fluticasone, 2 spray, Nasal, Daily  heparin (porcine), 5,000 Units, Subcutaneous, Q12H  hydrALAZINE, 100 mg, Oral, Q8H  insulin glargine, 20 Units, Subcutaneous, Q12H  insulin lispro, 2-7 Units, Subcutaneous, 4x Daily AC & at Bedtime  levothyroxine, 150 mcg, Oral, Q AM  pantoprazole, 40 mg, Oral, Q AM  piperacillin-tazobactam, 3.375 g, Intravenous, Q12H  pregabalin, 25 mg, Oral, BID  sodium bicarbonate, 650 mg, Oral, TID  sodium chloride, 10 mL, Intravenous, Q12H  Vancomycin  Pharmacy Intermittent/Pulse Dosing, , Not Applicable, Daily      IV Meds:   Pharmacy to dose vancomycin,   Pharmacy To Dose:,         Results Reviewed:   I have personally reviewed the results from the time of this admission to 5/24/2025 10:10 EDT     Results from last 7 days   Lab Units 05/24/25  0612 05/23/25  0740 05/23/25  0304   SODIUM mmol/L 142 139 138   POTASSIUM mmol/L 4.6 4.5 4.5   CHLORIDE mmol/L 110* 105 104   CO2 mmol/L 16.0* 20.0* 16.0*   BUN mg/dL 75* 83* 84*   CREATININE mg/dL 4.72* 4.82* 4.85*   CALCIUM mg/dL 7.6* 7.7* 8.1*   BILIRUBIN mg/dL  --   --  0.2   ALK PHOS U/L  --   --  126*   ALT (SGPT) U/L  --   --  6   AST (SGOT) U/L  --   --  12   GLUCOSE mg/dL 58* 134* 137*       Estimated Creatinine Clearance: 10.8 mL/min (A) (by C-G formula based on SCr of 4.72 mg/dL (H)).                Results from last 7 days   Lab Units 05/24/25  0612 05/23/25  0740 05/23/25  0304   WBC 10*3/mm3 14.02* 13.26* 12.99*   HEMOGLOBIN g/dL 7.4* 7.4* 8.4*   PLATELETS 10*3/mm3 387 392 404       Results from last 7 days   Lab Units 05/23/25  0304   INR  1.31*       Assessment / Plan     ASSESSMENT:  1.  Acute kidney injury, this could also represent progression of kidney disease.  She has some uremic symptoms but does not want to have any hemodialysis.  2.  Chronic kidney disease stage IV, mainly related to combination of hypertension and diabetes  3.  Hypertension with chronic kidney disease  4.  Type 2 diabetes with chronic kidney disease  5.  Anemia, partially related to chronic kidney disease  6.  Metabolic acidosis, may related to kidney disease  7.  Bilateral lower extremity cellulitis    PLAN:  1.  Monitor kidney function for now, she will be continued on supportive care for now.  She does not want to proceed with any hemodialysis.  She understands consequences of not getting dialysis  2.  Continue bisoprolol and hydralazine.  Monitor blood pressure closely  3.  Monitor hemoglobin for now  4.  Continue oral sodium  bicarbonate.  CO2 level will be monitored  5.  Continue IV antibiotics    I discussed with the patient's nurse.  I also discussed with the patient's 2 grandsons.  For now the patient will be continued on the same medications.  There will be meeting with the hospice team soon.    Thank you for involving us in the care of Halie Guidry.  Please feel free to call with any questions.    Geraldine Zhuo MD  05/24/25  10:10 EDT    [facility]  [phone]    Much of this encounter note is an electronic transcription/translation of spoken language to printed text. The electronic translation of spoken language may permit erroneous, or at times, nonsensical words or phrases to be inadvertently transcribed; Although I have reviewed the note for such errors, some may still exist.

## 2025-05-25 NOTE — PROGRESS NOTES
[facility] Clinical Pharmacy Services: Vancomycin Level Monitoring Note    Halie Guidry is a 84 y.o. female who is on day 3/5 of pharmacy to dose vancomycin for Skin and Soft Tissue.    Estimated Creatinine Clearance: 10.9 mL/min (A) (by C-G formula based on SCr of 4.67 mg/dL (H)).    Current Vanc Dose: intermittent dosing  Results from last 7 days   Lab Units 05/24/25  0612 05/23/25  1701   VANCOMYCIN RM mcg/mL 23.10 26.40     Minimal improvement in renal function from days prior. Given that, I doubt her serum concentrations have drastically changed, I do not suspect another dose will be required for this patient with the remaining duration of therapy. With 2 more days remaining no level will be repeated. Cultures are NGTD, patient is afebrile and stable as of right now. If her clinical status changes we can certainly revisit this plan.    Next Level Date and Time: TBD    No changes at this time. Pharmacy is continuing to monitor and will adjust as needed.    Filiberto Barrera [facility]  Clinical Pharmacist

## 2025-05-25 NOTE — SIGNIFICANT NOTE
05/25/25 0841   OTHER   Discipline physical therapist   Rehab Time/Intention   Session Not Performed other (see comments)  (Pt. Hgb subtherapeutic at 6.3. Will f/u 5/26.)   Recommendation   PT - Next Appointment 05/26/25

## 2025-05-25 NOTE — PROGRESS NOTES
Name: Halie Guidry ADMIT: 2025   : 1940  PCP: Provider, No Known    MRN: [id number] LOS: 2 days   AGE/SEX: 84 y.o. female  ROOM: [facility]     Subjective   Subjective   2025  Patient seen and examined at bedside.  She is able to answer orientation questions but cognition appears poor.  She is ambivalent about receiving PRBC but wishes to focus on comfort.  Discussed with sons Derrick and Josse who wish to respect patient wishes and focus on comfort and do not wish for PRBC to be given.  They are in agreement with comfort care and hospice consult.  Though patient cognition poor at his present and she is in agreement with comfort measures, stopping on comfort meds, and focusing on comfort and not receiving PRBC.       Objective   Objective   Vital Signs  Temp:  [97.9 °F (36.6 °C)-99.9 °F (37.7 °C)] 99.5 °F (37.5 °C)  Heart Rate:  [69-81] 69  Resp:  [16-20] 18  BP: (121-160)/(50-72) 134/55  SpO2:  [93 %-96 %] 95 %  on  Flow (L/min) (Oxygen Therapy):  [2] 2;   Device (Oxygen Therapy): nasal cannula  Body mass index is 32.62 kg/m².  Physical Exam  Constitutional:       Appearance: She is ill-appearing.      Comments: AAO x 3, lethargic   HENT:      Head: Normocephalic and atraumatic.      Nose: Nose normal. No congestion.      Mouth/Throat:      Pharynx: Oropharynx is clear. No oropharyngeal exudate.   Eyes:      General: No scleral icterus.  Cardiovascular:      Rate and Rhythm: Normal rate and regular rhythm.      Heart sounds: No murmur heard.     No friction rub. No gallop.   Pulmonary:      Effort: No respiratory distress.      Breath sounds: No wheezing or rales.   Abdominal:      General: There is no distension.      Tenderness: There is no abdominal tenderness. There is no guarding.   Musculoskeletal:         General: No signs of injury.      Cervical back: Normal range of motion. No rigidity.   Skin:     Coloration: Skin is not jaundiced.      Findings: No bruising or lesion.    Neurological:      Motor: Weakness present.       Results Review     I reviewed the patient's new clinical results.  Results from last 7 days   Lab Units 05/25/25  0443 05/24/25  0612 05/23/25  0740 05/23/25  0304   WBC 10*3/mm3 13.84* 14.02* 13.26* 12.99*   HEMOGLOBIN g/dL 6.3* 7.4* 7.4* 8.4*   PLATELETS 10*3/mm3 362 387 392 404     Results from last 7 days   Lab Units 05/25/25  0443 05/24/25  0612 05/23/25  0740 05/23/25  0304   SODIUM mmol/L 140 142 139 138   POTASSIUM mmol/L 4.9 4.6 4.5 4.5   CHLORIDE mmol/L 108* 110* 105 104   CO2 mmol/L 18.2* 16.0* 20.0* 16.0*   BUN mg/dL 71* 75* 83* 84*   CREATININE mg/dL 4.67* 4.72* 4.82* 4.85*   GLUCOSE mg/dL 86 58* 134* 137*   EGFR mL/min/1.73 8.8* 8.6* 8.4* 8.4*     Results from last 7 days   Lab Units 05/23/25  0304   ALBUMIN g/dL 3.1*   BILIRUBIN mg/dL 0.2   ALK PHOS U/L 126*   AST (SGOT) U/L 12   ALT (SGPT) U/L 6     Results from last 7 days   Lab Units 05/25/25  0443 05/24/25  0612 05/23/25  0740 05/23/25  0304   CALCIUM mg/dL 7.8* 7.6* 7.7* 8.1*   ALBUMIN g/dL  --   --   --  3.1*       Hemoglobin A1C   Date/Time Value Ref Range Status   05/23/2025 0740 7.50 (H) 4.80 - 5.60 % Final     Glucose   Date/Time Value Ref Range Status   05/25/2025 0713 118 70 - 130 mg/dL Final   05/24/2025 2038 130 70 - 130 mg/dL Final   05/24/2025 1713 72 70 - 130 mg/dL Final   05/24/2025 1616 51 (L) 70 - 130 mg/dL Final   05/24/2025 1153 92 70 - 130 mg/dL Final   05/24/2025 0743 73 70 - 130 mg/dL Final   05/23/2025 2008 161 (H) 70 - 130 mg/dL Final       No radiology results for the last day    I have personally reviewed all medications:  Scheduled Medications  atorvastatin, 80 mg, Oral, Nightly  bisoprolol, 10 mg, Oral, Daily  ferrous sulfate, 325 mg, Oral, Every Other Day  fluticasone, 2 spray, Nasal, Daily  heparin (porcine), 5,000 Units, Subcutaneous, Q12H  hydrALAZINE, 100 mg, Oral, Q8H  insulin glargine, 20 Units, Subcutaneous, Q12H  insulin lispro, 2-7 Units, Subcutaneous, 4x  Daily AC & at Bedtime  levothyroxine, 150 mcg, Oral, Q AM  pantoprazole, 40 mg, Oral, Q AM  piperacillin-tazobactam, 3.375 g, Intravenous, Q12H  pregabalin, 25 mg, Oral, BID  sodium bicarbonate, 650 mg, Oral, TID  sodium chloride, 10 mL, Intravenous, Q12H  Vancomycin Pharmacy Intermittent/Pulse Dosing, , Not Applicable, Daily    Infusions  Pharmacy to dose vancomycin,   Pharmacy To Dose:,     Diet  Diet: Regular/House, Cardiac, Diabetic; Healthy Heart (2-3 Na+); Consistent Carbohydrate; Fluid Consistency: Thin (IDDSI 0)    I have personally reviewed:  [x]  Laboratory   [x]  Microbiology   [x]  Radiology   [x]  EKG/Telemetry  [x]  Cardiology/Vascular   []  Pathology    []  Records       Assessment/Plan     Active Hospital Problems    Diagnosis  POA    **Cellulitis [L03.90]  Yes    Leg pain [M79.606]  Yes    CKD (chronic kidney disease) stage 4, GFR 15-29 ml/min [N18.4]  Yes    Anemia due to chronic kidney disease [N18.9, D63.1]  Yes    Primary malignant neuroendocrine tumor of pancreas [C7A.8]  Yes    Acute kidney injury [N17.9]  Yes    Type 2 diabetes mellitus with hyperglycemia, with long-term current use of insulin [E11.65, Z79.4]  Not Applicable    Benign essential hypertension [I10]  Yes      Resolved Hospital Problems   No resolved problems to display.       84 y.o. female admitted with Cellulitis.    84 y.o. female with history of CKD stage 4, peripheral vascular disease, T2DM, HTN, hypothyroidism and other conditions as listed in history that presents to [facility] with complaints of worsening bilateral lower extremity pain that has been constant and progressive in nature, admitted with Cellulitis.     Bilateral lower extremity pain  Peripheral vascular disease  Cellulitis, leukocytosis  - Recent arterial duplex from 05/08 showing severely reduced WILLIS on left and right, waveforms consistent with aorto-iliac disease, severe digital ischemia. WBC count elevated, had associated purulent drainage  from lower extremities on arrival. She has been on broad spectrum antibiotics, while further workup ordered-WBC continues to worsen. Vascular consulted and evaluated, she would not want any type of surgical intervention and expressed wishes for palliative care so they have been consulted and assessed. Goal is to maintain patient comfort. [facility] consulted to evaluate.   - Discussed with patient and family and will hold antibiotics as the goal is to be comfort care.  Family and patient wish to be made comfort care and to focus on comfort only.  None comfort care meds to be stopped as per patient and family wishes.  - Will continue current medications pending meeting with hospice today but as per patient and family wishes we will go ahead and make comfort care with as needed pain medications on board.     VIPUL on CKD stage 4/Metabolic acidosis  - Creatinine 4.85 on arrival, baseline ~3.2. CO2 low. Recent renal US reviewed. Nephrology consulted to assess, she stated she would not want dialysis if this required and has expressed wishes for comfort measures.   -Patient made comfort care as per patient and family wishes  -Hospice consult later today     Type 2 diabetes with hyperglycemia  - A1c 7.50%. Glargine and SSI ordered, which she remains on at present, monitoring glucose levels.      Hypertension  - Trend BP to guide further management.      Anemia  - Hemoglobin 6.3 this morning, no overt bleeding, suspect secondary to anemia of chronic disease  -Patient declining PRBC at this time, discussed with family and patient and wished to be made comfort care with hospice consult.  Hospice to meet with family later today.  Focus is on comfort as per discussion with patient and family.  -Will stop checking labs as focuses on comfort     Neuroendocrine tumor of pancreas      Heparin SC for DVT prophylaxis.  Comfort care.  Discussed with patient, family, and nursing staff.  Anticipate discharge  meeting with hospice today     Expected discharge date/ time has not been documented.      DO [facility]  05/25/25  09:17 EDT

## 2025-05-25 NOTE — CONSULTS
[facility] Visit Report    Halie Guidry  [id number]  5/25/2025    [facility] received and records reviewed with [facility] medical officer Dr Star Mckeon. Patient is medically eligible for services at discharge--not HSB eligible at this time.     Met with patient son Derrick along with his girlfriend Marlyn away from bedside per their request. Detailed explanation of services provided including HSB eligibility criteria and services once HSB eligible. Derrick's expressed wishes are for his mother tpo be comfortable and for her to remain at [facility] for EOL care with [facility] services when eligible for HSB admission.     [facility] will continue to follow up remotely/daily for GIP -v- discharge plan. Please call with any updates or change in care needs.    Thank you for allowing [facility] to participate with this patient's and family care needs.    Rosa Montez RN   [facility] Admissions Coordinator  [phone]

## 2025-05-25 NOTE — PROGRESS NOTES
[facility] Progress Note      Patient Name: Halie Guidry  : 1940  MRN: [id number]  Primary Care Physician:  Provider, No Known  Date of admission: 2025    Subjective     Interval History:   Reason for follow up: Management of acute kidney injury on top of CKD stage IV.    Chart was reviewed.  Events were noted.    She has not had fever.  Urine output is acceptable.    The patient is sleepy.  She still does not want to have any hemodialysis or blood transfusion.  She was evaluated by palliative care team.      Objective     Vitals:   Temp:  [97.9 °F (36.6 °C)-99.9 °F (37.7 °C)] 99.5 °F (37.5 °C)  Heart Rate:  [66-76] 66  Resp:  [16-20] 16  BP: (121-160)/(50-72) 132/58  Flow (L/min) (Oxygen Therapy):  [2] 2    Intake/Output Summary (Last 24 hours) at 2025 1019  Last data filed at 2025 0900  Gross per 24 hour   Intake 620 ml   Output 650 ml   Net -30 ml       Physical Exam:    General Appearance: Sleepy since she got pain medications today, no acute distress   Skin: warm and dry  HEENT: oral mucosa normal, nonicteric sclera  Neck: supple, no JVD  Lungs: CTA  Heart: RRR, normal S1 and S2  Abdomen: soft, nontender, nondistended  : no palpable bladder  Extremities: no edema, cyanosis or clubbing  Neuro: normal speech and mental status     Scheduled Meds:     atorvastatin, 80 mg, Oral, Nightly  bisoprolol, 10 mg, Oral, Daily  ferrous sulfate, 325 mg, Oral, Every Other Day  fluticasone, 2 spray, Nasal, Daily  heparin (porcine), 5,000 Units, Subcutaneous, Q12H  hydrALAZINE, 100 mg, Oral, Q8H  insulin glargine, 20 Units, Subcutaneous, Q12H  insulin lispro, 2-7 Units, Subcutaneous, 4x Daily AC & at Bedtime  levothyroxine, 150 mcg, Oral, Q AM  pantoprazole, 40 mg, Oral, Q AM  pregabalin, 25 mg, Oral, BID  sodium bicarbonate, 650 mg, Oral, TID  sodium chloride, 10 mL, Intravenous, Q12H      IV Meds:   Pharmacy to dose vancomycin,   Pharmacy To Dose:,         Results  Reviewed:   I have personally reviewed the results from the time of this admission to 5/25/2025 10:19 EDT     Results from last 7 days   Lab Units 05/25/25  0443 05/24/25  0612 05/23/25  0740 05/23/25  0304   SODIUM mmol/L 140 142 139 138   POTASSIUM mmol/L 4.9 4.6 4.5 4.5   CHLORIDE mmol/L 108* 110* 105 104   CO2 mmol/L 18.2* 16.0* 20.0* 16.0*   BUN mg/dL 71* 75* 83* 84*   CREATININE mg/dL 4.67* 4.72* 4.82* 4.85*   CALCIUM mg/dL 7.8* 7.6* 7.7* 8.1*   BILIRUBIN mg/dL  --   --   --  0.2   ALK PHOS U/L  --   --   --  126*   ALT (SGPT) U/L  --   --   --  6   AST (SGOT) U/L  --   --   --  12   GLUCOSE mg/dL 86 58* 134* 137*       Estimated Creatinine Clearance: 10.9 mL/min (A) (by C-G formula based on SCr of 4.67 mg/dL (H)).                Results from last 7 days   Lab Units 05/25/25  0443 05/24/25  0612 05/23/25  0740 05/23/25  0304   WBC 10*3/mm3 13.84* 14.02* 13.26* 12.99*   HEMOGLOBIN g/dL 6.3* 7.4* 7.4* 8.4*   PLATELETS 10*3/mm3 362 387 392 404       Results from last 7 days   Lab Units 05/23/25  0304   INR  1.31*       Assessment / Plan     ASSESSMENT:  1.  Acute kidney injury, this could also represent progression of kidney disease.  She has some uremic symptoms but does not want to have any hemodialysis.  2.  Chronic kidney disease stage IV, mainly related to combination of hypertension and diabetes  3.  Hypertension with chronic kidney disease  4.  Type 2 diabetes with chronic kidney disease  5.  Anemia, partially related to chronic kidney disease.  Hemoglobin is lower but the patient does not want blood transfusion  6.  Metabolic acidosis, may related to kidney disease  7.  Bilateral lower extremity cellulitis    PLAN:  1.  Monitor kidney function for now, she will be continued on supportive care for now.  She does not want to proceed with any hemodialysis.  She understood consequences of not getting dialysis  2.  Continue bisoprolol and hydralazine.  Monitor blood pressure closely  3.  Monitor hemoglobin for  now  4.  Continue oral sodium bicarbonate.  CO2 level will be monitored  5.  Continue IV antibiotics    I discussed with the patient's nurse. For now the patient will be continued on the same medications.  There will be meeting with the hospice team soon.    If the family decide to proceed with hospice, medications will be stopped and I will sign off and be available to answer any questions or concerns.    Geraldine Zhou MD  05/25/25  10:19 EDT    [facility]  [phone]    Much of this encounter note is an electronic transcription/translation of spoken language to printed text. The electronic translation of spoken language may permit erroneous, or at times, nonsensical words or phrases to be inadvertently transcribed; Although I have reviewed the note for such errors, some may still exist.

## 2025-05-25 NOTE — SIGNIFICANT NOTE
05/25/25 0811   OTHER   Discipline occupational therapist   Rehab Time/Intention   Session Not Performed other (see comments)  (6.3 hgb. Not yet appropriate for skilled OT services. Will f/u 5/26.)   Recommendation   OT - Next Appointment 05/26/25

## 2025-05-25 NOTE — PLAN OF CARE
Goal Outcome Evaluation:           Progress: declining  Outcome Evaluation: Pt made full comfort measures today. PPS 30%, pt ate most of all meals. Pt routine home medications, IV antibiotics and heparin discontinued per family wishes. Pt family met with hospice today. Pt does not qualify for HSB yet d/t not needing frequent IV meds. They will continue to follow. Will continue with comfort care.

## 2025-05-25 NOTE — PLAN OF CARE
Goal Outcome Evaluation:  Plan of Care Reviewed With: patient        Progress: declining  Outcome Evaluation: Pt slept throughout the night.  Medicated with 0.5mg dilaudid for pain.  Meds crushed in applesauce.  Lab called this am with critical H&H.   called and will  discuss with patient during his morning rounds.

## 2025-05-26 NOTE — PROGRESS NOTES
Name: Halie Guidry ADMIT: 2025   : 1940  PCP: Provider, No Known    MRN: [id number] LOS: 3 days   AGE/SEX: 84 y.o. female  ROOM: On license of [facility]     Subjective   Subjective   2025  Patient seen and examined at bedside.  She is able to answer orientation questions but cognition appears poor.  She is ambivalent about receiving PRBC but wishes to focus on comfort.  Discussed with sons Derrick and Josse who wish to respect patient wishes and focus on comfort and do not wish for PRBC to be given.  They are in agreement with comfort care and hospice consult.  Though patient cognition poor at his present and she is in agreement with comfort measures, stopping on comfort meds, and focusing on comfort and not receiving PRBC.    2025  Patient requiring IV pain meds overnight.  She is requesting increased pain meds.  Also complains of itching.  She remains comfort care.       Objective   Objective   Vital Signs  Temp:  [97.4 °F (36.3 °C)-97.6 °F (36.4 °C)] 97.4 °F (36.3 °C)  Heart Rate:  [64-71] 71  Resp:  [16] 16  BP: (122-181)/(50-69) 181/69  SpO2:  [92 %-93 %] 92 %  on  Flow (L/min) (Oxygen Therapy):  [2] 2;   Device (Oxygen Therapy): room air  Body mass index is 32.62 kg/m².  Physical Exam  Constitutional:       Appearance: She is ill-appearing.      Comments: AAO x 3, lethargic   HENT:      Head: Normocephalic and atraumatic.      Nose: Nose normal. No congestion.      Mouth/Throat:      Pharynx: Oropharynx is clear. No oropharyngeal exudate.   Eyes:      General: No scleral icterus.  Cardiovascular:      Rate and Rhythm: Normal rate and regular rhythm.      Heart sounds: No murmur heard.     No friction rub. No gallop.   Pulmonary:      Effort: No respiratory distress.      Breath sounds: No wheezing or rales.   Abdominal:      General: There is no distension.      Tenderness: There is no abdominal tenderness. There is no guarding.   Musculoskeletal:         General: Tenderness present. No  signs of injury.      Cervical back: Normal range of motion. No rigidity.   Skin:     Coloration: Skin is not jaundiced.      Findings: No bruising or lesion.      Comments: Leg wounds wrapped bilaterally   Neurological:      Motor: Weakness present.       Results Review     I reviewed the patient's new clinical results.  Results from last 7 days   Lab Units 05/25/25  0443 05/24/25  0612 05/23/25  0740 05/23/25  0304   WBC 10*3/mm3 13.84* 14.02* 13.26* 12.99*   HEMOGLOBIN g/dL 6.3* 7.4* 7.4* 8.4*   PLATELETS 10*3/mm3 362 387 392 404     Results from last 7 days   Lab Units 05/25/25 0443 05/24/25  0612 05/23/25  0740 05/23/25  0304   SODIUM mmol/L 140 142 139 138   POTASSIUM mmol/L 4.9 4.6 4.5 4.5   CHLORIDE mmol/L 108* 110* 105 104   CO2 mmol/L 18.2* 16.0* 20.0* 16.0*   BUN mg/dL 71* 75* 83* 84*   CREATININE mg/dL 4.67* 4.72* 4.82* 4.85*   GLUCOSE mg/dL 86 58* 134* 137*   EGFR mL/min/1.73 8.8* 8.6* 8.4* 8.4*     Results from last 7 days   Lab Units 05/23/25  0304   ALBUMIN g/dL 3.1*   BILIRUBIN mg/dL 0.2   ALK PHOS U/L 126*   AST (SGOT) U/L 12   ALT (SGPT) U/L 6     Results from last 7 days   Lab Units 05/25/25 0443 05/24/25  0612 05/23/25  0740 05/23/25  0304   CALCIUM mg/dL 7.8* 7.6* 7.7* 8.1*   ALBUMIN g/dL  --   --   --  3.1*       Glucose   Date/Time Value Ref Range Status   05/25/2025 0713 118 70 - 130 mg/dL Final   05/24/2025 2038 130 70 - 130 mg/dL Final   05/24/2025 1713 72 70 - 130 mg/dL Final   05/24/2025 1616 51 (L) 70 - 130 mg/dL Final   05/24/2025 1153 92 70 - 130 mg/dL Final   05/24/2025 0743 73 70 - 130 mg/dL Final   05/23/2025 2008 161 (H) 70 - 130 mg/dL Final       No radiology results for the last day    I have personally reviewed all medications:  Scheduled Medications  pregabalin, 25 mg, Oral, BID    Infusions     Diet  Diet: Regular/House, Cardiac, Diabetic; Healthy Heart (2-3 Na+); Consistent Carbohydrate; Fluid Consistency: Thin (IDDSI 0)    I have personally reviewed:  [x]  Laboratory    [x]  Microbiology   [x]  Radiology   [x]  EKG/Telemetry  [x]  Cardiology/Vascular   []  Pathology    []  Records       Assessment/Plan     Active Hospital Problems    Diagnosis  POA    **Cellulitis [L03.90]  Yes    Leg pain [M79.606]  Yes    CKD (chronic kidney disease) stage 4, GFR 15-29 ml/min [N18.4]  Yes    Anemia due to chronic kidney disease [N18.9, D63.1]  Yes    Primary malignant neuroendocrine tumor of pancreas [C7A.8]  Yes    Acute kidney injury [N17.9]  Yes    Type 2 diabetes mellitus with hyperglycemia, with long-term current use of insulin [E11.65, Z79.4]  Not Applicable    Benign essential hypertension [I10]  Yes      Resolved Hospital Problems   No resolved problems to display.       84 y.o. female admitted with Cellulitis.    84 y.o. female with history of CKD stage 4, peripheral vascular disease, T2DM, HTN, hypothyroidism and other conditions as listed in history that presents to [facility] with complaints of worsening bilateral lower extremity pain that has been constant and progressive in nature, admitted with Cellulitis.     Bilateral lower extremity pain  Peripheral vascular disease  Cellulitis, leukocytosis  - Recent arterial duplex from 05/08 showing severely reduced WILLIS on left and right, waveforms consistent with aorto-iliac disease, severe digital ischemia. WBC count elevated, had associated purulent drainage from lower extremities on arrival. She has been on broad spectrum antibiotics, while further workup ordered-WBC continues to worsen. Vascular consulted and evaluated, she would not want any type of surgical intervention and expressed wishes for palliative care so they have been consulted and assessed. Goal is to maintain patient comfort. [facility] consulted to evaluate.   - Discussed with patient and family and will hold antibiotics as the goal is to be comfort care.  Family and patient wish to be made comfort care and to focus on comfort only.  None comfort care meds  to be stopped as per patient and family wishes.  - Patient has been made comfort care as per her and family wishes.  -She is with complaints of increased pain and itching.  -Dilaudid 1 mg IV every hour as needed breakthrough pain  -Benadryl 12.5 mg IV every 6 hours as needed itching  -Hospice following for possible scatter bed     VIPUL on CKD stage 4/Metabolic acidosis  - Creatinine 4.85 on arrival, baseline ~3.2. CO2 low. Recent renal US reviewed. Nephrology consulted to assess, she stated she would not want dialysis if this required and has expressed wishes for comfort measures.   -Patient made comfort care as per patient and family wishes  - Hospice following     Type 2 diabetes with hyperglycemia  - A1c 7.50%. Glargine and SSI ordered, which she remains on at present, monitoring glucose levels.      Hypertension  - Patient is comfort care, medication is not focused on comfort have been discontinued as per patient family wishes     Anemia  - Hemoglobin 6.3 this morning, no overt bleeding, suspect secondary to anemia of chronic disease  -Patient declining PRBC at this time, discussed with family and patient and wished to be made comfort care with hospice consult.  Hospice to meet with family later today.  Focus is on comfort as per discussion with patient and family.  -Will stop checking labs as focuses on comfort     Neuroendocrine tumor of pancreas      Comfort care  for DVT prophylaxis.  Comfort care.  Discussed with patient and nursing staff.  Anticipate discharge: Patient is comfort care requiring IV pain meds and hospice is following for possible scatter bed placement  Expected discharge date/ time has not been documented.      Linda Barrera DO  [facility]  05/26/25  07:49 EDT

## 2025-05-26 NOTE — PLAN OF CARE
Goal Outcome Evaluation:           Progress: declining  Outcome Evaluation: PPS 20%, pt ate bites of dinner and sips throughout the day. Wound care completed to BLE. Baig placed. Pt had increased pain and required IV dilaudid X 2. IV ativan X 1 and IV benadryl for itching X 1. Pt mostly sleeping throughout the day. Will continue comfort care.

## 2025-05-26 NOTE — SIGNIFICANT NOTE
05/26/25 1512   OTHER   Discipline physical therapist   Rehab Time/Intention   Session Not Performed other (see comments)  (Pt. is now full comfort measures. PT will s/o; reorder if status changes.)

## 2025-05-26 NOTE — PLAN OF CARE
Goal Outcome Evaluation:   PPS 30%. Restless. Tried to get OOB several times. Pt complaining of leg pain & itching. PRN's utilized overnight include: 0.5mg dilaudid x1, 1mg ativan x1, and atarax x2. Meds crushed in applesauce. Purewick in place. Bladder scan unremarkable. Will continue to monitor per palliative goals of care.

## 2025-05-27 NOTE — PROGRESS NOTES
.        [facility]   Inpatient Palliative Care Follow up  Date: 5/27/2025   Reason for follow up: symptom management       Interval History    Information obtained from: Be MILLER    Follow up Information:  Patient sitting up in bed, restless. Unaware of situation. No family present. I reviewed hospice note on 5/25/2025. I reviewed MAR and I/O. I spoke with Be Blount Symptom Assessment Scale (ESAS): ESAS completed by Health care professional caregiver  Pain: 6  Tiredness: 7  Drowsiness: 7  Nausea:  unable to assess  Appetite: 10 Worst possible lack of appetite  Shortness of breath: 0 No shortness of breath  Depression:  unable to assess  Anxiety: 5  Best Wellbeing: 10 Worst possible wellbeing  Other(Constipation): unable to asses        Summary of Discussion:     5/23/2025-* The patient is unable to participate in goals of care conversation due to confusion and hallucinations. I have attempted to call both sons (Derrick and Josse) and no answer  throughout day. I have left voicemail with Josse and unable for Derrick. I attempted again to call Derrick at 1532 and was able to discuss his mothers conditions. He recalls her previous conversations from last admission. He does express some sadness and shock because he was not aware that his mother had been admitted to the hospital again. His son is getting  tomorrow and he was hopeful that she would be able to attend, which patient shared with me last admission. He was updated on her current state of health. He is agreeable to hospice consult to support patient so I placed referral. He confirms she is DNR, DNI, no cardioversion and no dialysis. In addition, they are NOT interested in vascular interventions. He wants her pain to managed and her to be comfortable. He is planning to visit later today.I spoke with RN and followed up with patient from this morning, she appears more comfortable with current regimen. Hospice updated me and they  "are scheduled to see on Hank, May 25 at 1230 per Derrick request due to son's wedding. I spoke with SERGE Su and PAUL Burkett.     5/27/2025- The patient is unable to participate in goals of care conversation due to impaired cognition. I have reviewed chart and RN had reached out due to patient appeared restless despite current interventions.  There are concerns with given oral medications. I provided order for Ativan 1 mg IV every 2 hours prn anxiety/restlessness.  Her son/HCS, Derrick had been contacted by hospice and follow up meeting for HSB is scheduled for tomorrow at 1530. I spoke with PAUL Lr.               Physical Assessment   /64 (BP Location: Right arm, Patient Position: Lying)   Pulse 74   Temp 98.9 °F (37.2 °C) (Oral)   Resp 18   Ht 172.7 cm (68\")   Wt 97.3 kg (214 lb 8.1 oz)   SpO2 94%   BMI 32.62 kg/m²    Palliative Performance Scale: Performance 20% based on the following measures: Ambulation: Totally bed bound, Activity and Evidence of Disease: Unable to do any work, extensive evidence of disease, Self-Care: Total care required,  Intake: Minimal sips, LOC: Full, drowsy or confusion  Physical Exam  Pulmonary:      Effort: Pulmonary effort is normal.   Genitourinary:     Comments: F/c intact to BSD  Neurological:      Mental Status: She is lethargic and disoriented.   Psychiatric:         Cognition and Memory: Cognition is impaired.      Comments: Restless                 Data (labs/images reviewed)    WBC   Date Value Ref Range Status   05/25/2025 13.84 (H) 3.40 - 10.80 10*3/mm3 Final     RBC   Date Value Ref Range Status   05/25/2025 2.38 (L) 3.77 - 5.28 10*6/mm3 Final     Hemoglobin   Date Value Ref Range Status   05/25/2025 6.3 (C) 12.0 - 15.9 g/dL Final     Hematocrit   Date Value Ref Range Status   05/25/2025 20.4 (C) 34.0 - 46.6 % Final     MCV   Date Value Ref Range Status   05/25/2025 85.7 79.0 - 97.0 fL Final     MCH   Date Value Ref Range Status   05/25/2025 26.5 " (L) 26.6 - 33.0 pg Final     MCHC   Date Value Ref Range Status   05/25/2025 30.9 (L) 31.5 - 35.7 g/dL Final     RDW   Date Value Ref Range Status   05/25/2025 15.6 (H) 12.3 - 15.4 % Final     RDW-SD   Date Value Ref Range Status   05/25/2025 48.3 37.0 - 54.0 fl Final     MPV   Date Value Ref Range Status   05/25/2025 10.0 6.0 - 12.0 fL Final     Platelets   Date Value Ref Range Status   05/25/2025 362 140 - 450 10*3/mm3 Final     Neutrophil %   Date Value Ref Range Status   05/25/2025 86.7 (H) 42.7 - 76.0 % Final     Lymphocyte %   Date Value Ref Range Status   05/25/2025 4.3 (L) 19.6 - 45.3 % Final     Monocyte %   Date Value Ref Range Status   05/25/2025 6.4 5.0 - 12.0 % Final     Eosinophil %   Date Value Ref Range Status   05/25/2025 1.2 0.3 - 6.2 % Final     Basophil %   Date Value Ref Range Status   05/25/2025 0.8 0.0 - 1.5 % Final     Immature Grans %   Date Value Ref Range Status   05/25/2025 0.6 (H) 0.0 - 0.5 % Final     Neutrophils, Absolute   Date Value Ref Range Status   05/25/2025 12.00 (H) 1.70 - 7.00 10*3/mm3 Final     Lymphocytes, Absolute   Date Value Ref Range Status   05/25/2025 0.60 (L) 0.70 - 3.10 10*3/mm3 Final     Monocytes, Absolute   Date Value Ref Range Status   05/25/2025 0.89 0.10 - 0.90 10*3/mm3 Final     Eosinophils, Absolute   Date Value Ref Range Status   05/25/2025 0.16 0.00 - 0.40 10*3/mm3 Final     Basophils, Absolute   Date Value Ref Range Status   05/25/2025 0.11 0.00 - 0.20 10*3/mm3 Final     Immature Grans, Absolute   Date Value Ref Range Status   05/25/2025 0.08 (H) 0.00 - 0.05 10*3/mm3 Final     nRBC   Date Value Ref Range Status   05/25/2025 0.0 0.0 - 0.2 /100 WBC Final       Lab Results   Component Value Date    GLUCOSE 86 05/25/2025    BUN 71 (H) 05/25/2025    CREATININE 4.67 (H) 05/25/2025     05/25/2025    K 4.9 05/25/2025     (H) 05/25/2025    CALCIUM 7.8 (L) 05/25/2025    PROTEINTOT 8.0 05/23/2025    ALBUMIN 3.1 (L) 05/23/2025    ALT 6 05/23/2025    AST 12  05/23/2025    ALKPHOS 126 (H) 05/23/2025    BILITOT 0.2 05/23/2025    GLOB 4.9 05/23/2025    AGRATIO 0.6 05/23/2025    BCR 15.2 05/25/2025    ANIONGAP 13.8 05/25/2025    EGFR 8.8 (L) 05/25/2025       US Renal Bilateral  US RENAL BILATERAL-     Clinical: Acute renal insufficiency     COMPARISON 4/30/2024     FINDINGS: The right kidney measures 10.1 cm in length and the left  kidney measures 10.8 cm in length. Renal cortical echotexture is greater  than anticipated consistent with medical renal disease. No  hydronephrosis on the right or left. Bilateral renal cysts again  demonstrated with the largest on the right measuring 2.3 cm in maximum  diameter and the largest on the left measuring 3.8 cm in maximum  diameter. No renal calculus. Limited images of the bladder fail to  demonstrate either ureteral jet. Small amount of suspended debris is  suggested within the bladder lumen.     CONCLUSION: Renal cortical echotexture consistent with medical renal  disease, no hydronephrosis. Bilateral renal cysts. Neither ureteral jet  could be documented.     This report was finalized on 5/9/2025 7:28 PM by Dr. Eduardo Lopez M.D  on Workstation: [id number]     Adult Transthoracic Echo Complete W/ Cont if Necessary Per Protocol    Left ventricular ejection fraction appears to be 56 - 60%.    Left ventricular diastolic function was normal.         Palliative Care Assessment and Recommendations  Summary/Assessment:  Prognosis and Palliative Performance Scale:  Performance 20% based on the following measures: Ambulation: Totally bed bound, Activity and Evidence of Disease: Unable to do any work, extensive evidence of disease, Self-Care: Total care required,  Intake: Minimal sips, LOC: Full, drowsy or confusion  Disease State: No further treatment being pursued  Symptom Management:   Pain: Dilaudid 1 mg every 2 hours PRN  Shortness of Breath: oxygen, ativan 1 mg every 2 hours prn  Constipation: bisacodyl prn    Goals of Care  Treatment Preferences   Palliative Care Decision Making Capacity:   Healthcare Surrogate: Per health care directive  What is Most important to patient/family at this time: comfort  Code Status comfort measures only discussed with hospice RN on 5/25/2025  other Recommendations: hospice reevaluation on 5/28/2025 @ 1530  Follow up:  daily   Discussed plan with: RN      I spent a total of 25  minutes today caring for patient including reviewing records, speaking with patient/family and collaborating with care team.         Thank you for consulting palliative care. If you need to reach the provider on call for the palliative care team please call [phone]    Lizette Burleson, SERGE  5/27/2025 13:16 EDT

## 2025-05-27 NOTE — PROGRESS NOTES
Discharge Planning Assessment  [facility]     Patient Name: Halie Guidry  MRN: [id number]  Today's Date: 5/27/2025    Admit Date: 5/23/2025    Plan: palliative. GENNY Almodovar RN, CP.   Discharge Needs Assessment    No documentation.                  Discharge Plan       Row Name 05/27/25 1241       Plan    Plan palliative. GENNY Almodovar RN, CP.    Plan Comments The patient transferred to [facility] from [facility] on 5/23/25 @ 11:33. [facility] is trying to set up a time to meet with the family. CCP will continue to follow for any needs. GENNY Almodovar RN, CCP.                  Selected Continued Care - Episodes Includes continued care and service providers with selected services from the active episodes listed below          Selected Continued Care - Prior Encounters Includes continued care and service providers with selected services from prior encounters from 2/22/2025 to 5/27/2025      Discharged on 5/16/2025 Admission date: 5/7/2025 - Discharge disposition: Home or Self Care      Home Medical Care       Service Provider Services Address Phone Fax Patient Preferred    [facility] [address] [place] [address], [address] [id number] [phone] --                          Expected Discharge Date and Time       Expected Discharge Date Expected Discharge Time    May 30, 2025            Demographic Summary    No documentation.                  Functional Status    No documentation.                  Psychosocial    No documentation.                  Abuse/Neglect    No documentation.                  Legal    No documentation.                  Substance Abuse    No documentation.                  Patient Forms    No documentation.                     Stephanie Almodovar RN

## 2025-05-27 NOTE — PROGRESS NOTES
Name: Halie Guidry ADMIT: 2025   : 1940  PCP: Provider, No Known    MRN: [id number] LOS: 4 days   AGE/SEX: 84 y.o. female  ROOM: [facility]     Subjective   Subjective   Patient awake and resting in bed, she is confused. Discussed with nursing, she is having periods of increased restlessness and pain and requiring PRN medications for symptoms.        Objective   Objective   Vital Signs  Temp:  [98.6 °F (37 °C)-98.9 °F (37.2 °C)] 98.9 °F (37.2 °C)  Heart Rate:  [74-83] 74  Resp:  [18-22] 18  BP: (141-166)/(64-84) 141/64  SpO2:  [83 %-94 %] 94 %  on   ;   Device (Oxygen Therapy): room air  Body mass index is 32.62 kg/m².  Constitutional: elderly, ill appearing, restless at times  CV: Regular rate, regular rhythm  RESP: No rhonchi no wheezes  GI: soft, nontender, nondistended  MSK: Bilateral lower extremities wrapped with dressings, C/D/I  Skin: Warm, dry.  Neuro: no facial droop, no tremor, but confused  Results Review     I reviewed the patient's new clinical results.  Results from last 7 days   Lab Units 253 25  0612 25  0740 25  0304   WBC 10*3/mm3 13.84* 14.02* 13.26* 12.99*   HEMOGLOBIN g/dL 6.3* 7.4* 7.4* 8.4*   PLATELETS 10*3/mm3 362 387 392 404     Results from last 7 days   Lab Units 25  0443 25  0612 25  0740 25  0304   SODIUM mmol/L 140 142 139 138   POTASSIUM mmol/L 4.9 4.6 4.5 4.5   CHLORIDE mmol/L 108* 110* 105 104   CO2 mmol/L 18.2* 16.0* 20.0* 16.0*   BUN mg/dL 71* 75* 83* 84*   CREATININE mg/dL 4.67* 4.72* 4.82* 4.85*   GLUCOSE mg/dL 86 58* 134* 137*   EGFR mL/min/1.73 8.8* 8.6* 8.4* 8.4*     Results from last 7 days   Lab Units 25  0304   ALBUMIN g/dL 3.1*   BILIRUBIN mg/dL 0.2   ALK PHOS U/L 126*   AST (SGOT) U/L 12   ALT (SGPT) U/L 6     Results from last 7 days   Lab Units 25  0443 25  0612 25  0740 25  0304   CALCIUM mg/dL 7.8* 7.6* 7.7* 8.1*   ALBUMIN g/dL  --   --   --  3.1*       Glucose    Date/Time Value Ref Range Status   05/25/2025 0713 118 70 - 130 mg/dL Final   05/24/2025 2038 130 70 - 130 mg/dL Final   05/24/2025 1713 72 70 - 130 mg/dL Final   05/24/2025 1616 51 (L) 70 - 130 mg/dL Final   05/24/2025 1153 92 70 - 130 mg/dL Final       No radiology results for the last day    I have personally reviewed all medications:  Scheduled Medications  pregabalin, 25 mg, Oral, BID    Infusions     Diet  Diet: Regular/House, Cardiac, Diabetic; Healthy Heart (2-3 Na+); Consistent Carbohydrate; Fluid Consistency: Thin (IDDSI 0)    I have personally reviewed:  [x]  Laboratory   [x]  Microbiology   []  Radiology   []  EKG/Telemetry  []  Cardiology/Vascular   []  Pathology    []  Records       Assessment/Plan     Active Hospital Problems    Diagnosis  POA    **Cellulitis [L03.90]  Yes    Leg pain [M79.606]  Yes    CKD (chronic kidney disease) stage 4, GFR 15-29 ml/min [N18.4]  Yes    Anemia due to chronic kidney disease [N18.9, D63.1]  Yes    Primary malignant neuroendocrine tumor of pancreas [C7A.8]  Yes    Acute kidney injury [N17.9]  Yes    Type 2 diabetes mellitus with hyperglycemia, with long-term current use of insulin [E11.65, Z79.4]  Not Applicable    Benign essential hypertension [I10]  Yes      Resolved Hospital Problems   No resolved problems to display.     84 y.o. female with history of CKD stage 4, peripheral vascular disease, T2DM, HTN, hypothyroidism and other conditions as listed in history that presents to [facility] with complaints of worsening bilateral lower extremity pain that has been constant and progressive in nature, admitted with Cellulitis.    Bilateral lower extremity pain  Peripheral vascular disease  Cellulitis, leukocytosis  - Recent arterial duplex from 05/08 showing severely reduced WILLIS on left and right, waveforms consistent with aorto-iliac disease, severe digital ischemia. WBC count elevated, had associated purulent drainage from lower extremities on arrival.  She has been on broad spectrum antibiotics, while further workup ordered-WBC continues to worsen. Vascular consulted and evaluated, she would not want any type of surgical intervention and expressed wishes for palliative care so they have been consulted and assessed. Goal is to maintain patient comfort. [facility] consulted to evaluate.   - Previously discussed with patient and family and decision made to hold antibiotics as the goal is to be comfort care.  Family and patient wish to be made comfort care and to focus on comfort only.  Patient has been made comfort care as per her and family wishes.  - She is having pain and restlessness at times, requiring IV PRN medications for symptoms, can continue these as ordered for now, adjust regimen as needed to achieve comfort.   - Hospice following for possible scatter bed     VIPUL on CKD stage 4/Metabolic acidosis  - Creatinine 4.85 on arrival, baseline ~3.2. CO2 low. Recent renal US reviewed. Nephrology consulted to assess, she stated she would not want dialysis if this required and has expressed wishes for comfort measures.   - Patient made comfort care as per patient and family wishes  - Hospice following     Type 2 diabetes with hyperglycemia  - A1c 7.50%. Treated with SSI and Glargine prior to transition to comfort measures.    Hypertension  - Patient is comfort care, medication is not focused on comfort have been discontinued as per patient family wishes     Anemia  - Hemoglobin 6.3 previously, without evidence of bleeding, felt to be related to anemia of chronic disease. Patient declined transfusion, focus on comfort measures.       Neuroendocrine tumor of pancreas      Addendum: She is having uncontrolled pain, will increase IV dilaudid now and monitor response.      DVT N/A-comfort measures  Limited code - Comfort measures .  Discussed with patient and nursing staff.  Anticipate discharge  TBD  timing yet to be determined.  Expected Discharge Date: 5/30/2025;  Expected Discharge Time:       Zachariah Ivory DO  [facility]  05/27/25

## 2025-05-27 NOTE — PLAN OF CARE
Goal Outcome Evaluation:   Pt very restless and agitated for much of the shift. Changes to PRN meds made and improvement noted. Son to meet with hospice tomorrow. Baig in place, pt does not follow commands well enough to take meds by mouth. Bed alarm set.

## 2025-05-27 NOTE — PLAN OF CARE
Goal Outcome Evaluation:           Progress: declining  Outcome Evaluation: Patient slept off and on this shift.  PPS 20%.  Hydromorphone 1mg x3, Lorazepam 1mg x2 given for symptom management.  Baig intact and draining. Nursing will continue plan of care.

## 2025-05-28 PROBLEM — N18.4 CHRONIC RENAL DISEASE, STAGE IV: Status: ACTIVE | Noted: 2025-01-01

## 2025-05-28 NOTE — PLAN OF CARE
Goal Outcome Evaluation:           Progress: declining  Outcome Evaluation: Patient slept off and on throughout the shift.  PPS 20%.  Patient required Hydromorphone 1.5mg x4 and Lorazepam 1mg x3 for symptom management.  Baig to bedside drainage.  Nursing will continue plan of care.

## 2025-05-28 NOTE — CONSULTS
[facility] Bed: 05/28/2025  [facility] ID: [id number]  [facility] Diagnosis: N18.4 CKD stage 4  Symptom management: Pain, Restlessness    Arrived on unit. Met with Derrick at patient's bedside. Assessed patient. Explanation of services provided with focus on inpatient hospice services. Answered all questions and concerns. Discussed medical history, medications, symptom management concerns, and goals of care. Spoke with Star Mckeon MD (Hospice) and he provided verbal certification for inpatient hospice services. Family agreeable to services. Consents reviewed and signed with [facility]. Admission folder provided and reviewed with Derrick. He is aware that he can call us at anytime with needs.     Updated Leslie and  PAUL Tyler with Dr. Ivory and she is making him aware. Updated Stephanie Almodovar RN, BSN, CDP (Care Coordinator) and gave a copy of the completed benefit change form to her. The other copy placed in HIM basket.     Discharge/Readmit reviewed with Star Mckeon MD. New orders read back and verified with Dr. Mckeon.    Daily [facility] RN visits to being on 5/29/25.    Thank you for allowing us the opportunity to participate in the care of this patient and their family. Please call us with any questions, concerns, or changes in patient status.     Jose Antonio Marin, RN, BSN, PN  Referral and Admission Coordinator  [facility]   [phone]

## 2025-05-28 NOTE — CONSULTS
Visit with patient, DIL and granddaughter at bedside. Patient appeared to be very uncomfortable. RN was aware and reaching out to Dr. Ambrose was a [employer] before she eventually met and  her . Patient was anointed by Father Cam.  Chaplains remain available for support and prayer.

## 2025-05-28 NOTE — PROGRESS NOTES
"    Name: Halie Giudry ADMIT: 2025   : 1940  PCP: Provider, No Known    MRN: [id number] LOS: 5 days   AGE/SEX: 84 y.o. female  ROOM: [facility]     Subjective   Subjective   Patient awake and resting in bed, she remains confused, reportedly still restless and having pain requiring PRN medications.       Objective   Objective   Vital Signs  Temp:  [97.1 °F (36.2 °C)-97.2 °F (36.2 °C)] 97.1 °F (36.2 °C)  Heart Rate:  [74-94] 74  Resp:  [16-18] 18  BP: (162)/(75) 162/75  SpO2:  [91 %-95 %] 91 %  on   ;   Device (Oxygen Therapy): room air  Body mass index is 32.62 kg/m².  Constitutional: elderly, ill appearing, restless  CV: Regular rate, regular rhythm  RESP: No rhonchi no wheezes  GI: soft, nontender, nondistended  MSK: Bilateral lower extremities wrapped with dressings, C/D/I, tenderness to palpation  Skin: Warm, dry.  Neuro: confused, no tremor noted  Results Review     I reviewed the patient's new clinical results.  Results from last 7 days   Lab Units 253 25  0612 25  0740 25  0304   WBC 10*3/mm3 13.84* 14.02* 13.26* 12.99*   HEMOGLOBIN g/dL 6.3* 7.4* 7.4* 8.4*   PLATELETS 10*3/mm3 362 387 392 404     Results from last 7 days   Lab Units 25  0443 25  0612 25  0740 25  0304   SODIUM mmol/L 140 142 139 138   POTASSIUM mmol/L 4.9 4.6 4.5 4.5   CHLORIDE mmol/L 108* 110* 105 104   CO2 mmol/L 18.2* 16.0* 20.0* 16.0*   BUN mg/dL 71* 75* 83* 84*   CREATININE mg/dL 4.67* 4.72* 4.82* 4.85*   GLUCOSE mg/dL 86 58* 134* 137*   EGFR mL/min/1.73 8.8* 8.6* 8.4* 8.4*     Results from last 7 days   Lab Units 25  0304   ALBUMIN g/dL 3.1*   BILIRUBIN mg/dL 0.2   ALK PHOS U/L 126*   AST (SGOT) U/L 12   ALT (SGPT) U/L 6     Results from last 7 days   Lab Units 25  0443 25  0612 25  0740 25  0304   CALCIUM mg/dL 7.8* 7.6* 7.7* 8.1*   ALBUMIN g/dL  --   --   --  3.1*       No results found for: \"HGBA1C\", \"POCGLU\"      No radiology results " for the last day    I have personally reviewed all medications:  Scheduled Medications  pregabalin, 25 mg, Oral, BID    Infusions     Diet  Diet: Regular/House, Cardiac, Diabetic; Healthy Heart (2-3 Na+); Consistent Carbohydrate; Fluid Consistency: Thin (IDDSI 0)    I have personally reviewed:  []  Laboratory   []  Microbiology   []  Radiology   []  EKG/Telemetry  []  Cardiology/Vascular   []  Pathology    []  Records       Assessment/Plan     Active Hospital Problems    Diagnosis  POA    **Cellulitis [L03.90]  Yes    Leg pain [M79.606]  Yes    CKD (chronic kidney disease) stage 4, GFR 15-29 ml/min [N18.4]  Yes    Anemia due to chronic kidney disease [N18.9, D63.1]  Yes    Primary malignant neuroendocrine tumor of pancreas [C7A.8]  Yes    Acute kidney injury [N17.9]  Yes    Type 2 diabetes mellitus with hyperglycemia, with long-term current use of insulin [E11.65, Z79.4]  Not Applicable    Benign essential hypertension [I10]  Yes      Resolved Hospital Problems   No resolved problems to display.     84 y.o. female with history of CKD stage 4, peripheral vascular disease, T2DM, HTN, hypothyroidism and other conditions as listed in history that presents to [facility] with complaints of worsening bilateral lower extremity pain that has been constant and progressive in nature, admitted with Cellulitis.    Bilateral lower extremity pain  Peripheral vascular disease  Cellulitis, leukocytosis  - Recent arterial duplex from 05/08 showing severely reduced WILLIS on left and right, waveforms consistent with aorto-iliac disease, severe digital ischemia. WBC count elevated, had associated purulent drainage from lower extremities on arrival. She has been on broad spectrum antibiotics, while further workup ordered-WBC continues to worsen. Vascular consulted and evaluated, she would not want any type of surgical intervention. Discussion held with patient and family previously, who expressed wishes to focus on comfort,  so antibiotics discontinued and comfort orders placed.   - Palliative and [facility] following.  - On 05/27, had uncontrolled pain, increased Dilaudid and monitoring response to this. She is requiring IV PRN per discussion with team. For now, continue treatments as ordered, can adjust further as needed based on clinical course.   - She is having pain and restlessness at times, requiring IV PRN medications for symptoms, can continue these as ordered for now, adjust regimen as needed to achieve comfort.      VIPUL on CKD stage 4/Metabolic acidosis  - Creatinine 4.85 on arrival, baseline ~3.2. CO2 low. Recent renal US reviewed. Nephrology consulted to assess, she stated she would not want dialysis if this required and has expressed wishes for comfort measures.      Type 2 diabetes with hyperglycemia  - A1c 7.50%. Treated with SSI and Glargine prior to transition to comfort measures.    Hypertension  - Patient is comfort care, medications previously discontinued as did not align with goals of care.     Anemia  - Hemoglobin 6.3 previously, without evidence of bleeding, felt to be related to anemia of chronic disease. Patient declined transfusion, focus on comfort measures.       Neuroendocrine tumor of pancreas        DVT N/A-comfort measures  Limited code - Comfort measures .  Discussed with patient and nursing staff.  Anticipate discharge  TBD  timing yet to be determined.  Expected Discharge Date: 5/30/2025; Expected Discharge Time:  3:00 PM      DO [facility]  05/28/25

## 2025-05-28 NOTE — PROGRESS NOTES
Case Management Discharge Note      Final Note: admitted to a [facility] bed on 5/28/25. GENNY Almodovar RN, CCP.         Selected Continued Care - Admitted Since 5/23/2025       Destination Coordination complete.      Service Provider Services Address Phone Fax Patient Preferred    [facility] [address] [place] [address] [phone] --              Durable Medical Equipment    No services have been selected for the patient.                Dialysis/Infusion    No services have been selected for the patient.                Home Medical Care    No services have been selected for the patient.                Therapy    No services have been selected for the patient.                Community Resources    No services have been selected for the patient.                Community & DME    No services have been selected for the patient.                    Selected Continued Care - Episodes Includes continued care and service providers with selected services from the active episodes listed below          Selected Continued Care - Prior Encounters Includes continued care and service providers with selected services from prior encounters from 2/22/2025 to 5/28/2025      Discharged on 5/16/2025 Admission date: 5/7/2025 - Discharge disposition: Home or Self Care      Home Medical Care       Service Provider Services Address Phone Fax Patient Preferred    [facility] [address], [address], [place] [address] [phone] --                               Final Discharge Disposition Code: 51 - hospice medical facility

## 2025-05-28 NOTE — PLAN OF CARE
Goal Outcome Evaluation:           Progress: declining  Outcome Evaluation: Pt very restless today, increased terminal agitation. PPS 20%. Pt took a few sips. Pt received Dilaudid 1.5 mg and Ativan 1 mg X4 doses. Pt would rest intermittently and then continue to try to sit up and move around in the bed. Pt family met with graciela. Pt will be made a HSB pt. Pt assisted with oral care, turns and tyson care. Will continue with comfort care.

## 2025-05-29 NOTE — NURSING NOTE
[facility] Visit Report    Halie Guidry  [id number]  5/29/2025    [facility]    Reason for [facility] Admission:    Patient is a 84 y.o. female with a primary [facility] diagnosis of CKD. Admitted to [facility] for GIP for symptom management of pain, dyspnea, restlessness, anxiety. Contact     PPS: 10%      Medications in 24 hours:  -IV dilaudid 1.5mg q4h routine and q1h PRN (x3)  -IV ativan 2mg q4h routine and q1h PRN (x3)    Recommendations:  Continue to monitor for signs of decline and provide comfort measures. Contact [facility] providers at [phone] with any medication adjustment needs and at TOD.     Discussed medication adjustment with facility MD DR Mckeon also adding robinul.     Assessment:  Patient is in bed after getting PRN medications an hour after her scheduled. Per staff pt has been very stiff and unable to settle for the past 3 days. Pt was resting with here eyes closed but with assessment she did moan. She was warm to touch with a bounding hr. Multiple wounds notes from EPIC documentation. All with dressings intact at time of assessment. Lungs clear on 2L n/c. F/c patent with concentrated UOP and hypoactive b/s.     Collaboration:  Call to both sons with no answer.  left for Derrick with condition update and support provided. Training provided regarding assessment findings, disease progression, symptom management, recommendations and expected length of stay. Family v/u of pts condition and all questions were answered. Collaborated with [facility] RN and VINCENZO CURRIE about pts condition and recommendations.    Disposition:  Patient meets GIP criteria, requiring frequent administration and continued titration of parenteral medications to achieve and maintain symptom management. Patient appears to be unsafe for transport at this time, requiring increasing symptom management and frequent RN assessment. If patient were to stabilize she would  require LTC placement due to increased daily care needs.  Will continue [facility] RN visits to monitor for changes, assess needs and provide support.       Maribeth Sandoval RN  [facility]  Scattered Bed Team

## 2025-05-29 NOTE — H&P
[facility]  Admission H&P  Halie Guidry   1940   [facility] Admission Date: 5/28/2025   Current Date: 5/29/2025   Attending Provider: Star Mckeon MD    Chief Complaint: Pain    Information obtained from: past medical records    History of Present Illness:   83 yo F w/ history of CKD IV, PVD, DM2, HTN, hypothyroidism whop presented with lower extremity pain and cellulitis. She was found to have aorto-iliac disease and digital ischemia. Not a candidate for surgical intervention, transitioned to palliative measures and ultimately enrolled in hospice over interval.    This morning, nursing staff report increased restlessness and agitation. She had not receive any PRNs overnight but this morning required PRN dilaudid and ativan in addition to routine medications. She was not able to tolerate her oral pain medication this morning.    At time of my visit she is appearing more comfortable. She does not awaken to gentle stimuli. Appears to be no in acute distress. No family at bedside.    Review of Systems: Review of Systems - as per history       Past Medical and Surgical History:     Past Medical History:   Diagnosis Date    Acute metabolic encephalopathy 06/24/2022    Anxiety     Arthritis     Benign essential hypertension 08/20/2014    Bleeding disorder     Depression     Diabetes     Diabetes mellitus, type 2     Disc degeneration, lumbar     Headache, tension-type     Hyperlipidemia     Hypothyroidism     Neuropathy     Osteoporosis 09/09/2015    Pancreatic mass     Sept 2023, on Monthly Octreotide Injection    Peripheral neuropathy     Rotator cuff tear, left     Scoliosis     Shoulder pain     LEFT, TORN ROTATOR CUFF S/P FALL    Spinal stenosis       Past Surgical History:   Procedure Laterality Date    APPENDECTOMY      BILATERAL BREAST REDUCTION Bilateral 08/2015    CATARACT EXTRACTION  03/2015    CHOLECYSTECTOMY WITH INTRAOPERATIVE CHOLANGIOGRAM N/A  3/27/2022    Procedure: CHOLECYSTECTOMY LAPAROSCOPIC INTRAOPERATIVE CHOLANGIOGRAM;  Surgeon: Aiyana Hill MD;  Location: [facility] MAIN OR;  Service: General;  Laterality: N/A;    COLONOSCOPY  2015    WNL    ERCP N/A 2022    Procedure: ENDOSCOPIC RETROGRADE CHOLANGIOPANCREATOGRAPHY with sphincterotomy and balloon sweep;  Surgeon: Chilo Wilhelm MD;  Location: [facility] ENDOSCOPY;  Service: Gastroenterology;  Laterality: N/A;  PRE/POST - CBD stones    ERCP N/A 3/28/2022    Procedure: ENDOSCOPIC RETROGRADE CHOLANGIOPANCREATOGRAPHY WITH SPHINCTEROTOMY AND BALLOON SWEEP;  Surgeon: Chilo Wilhelm MD;  Location: [facility] ENDOSCOPY;  Service: Gastroenterology;  Laterality: N/A;  PRE: COMMON DUCT STONE  POST: COMMON DUCT STONE    KYPHOPLASTY      REDUCTION MAMMAPLASTY      TONSILLECTOMY          Past Family and Social History:     Social History     Socioeconomic History    Marital status:     Number of children: 3   Tobacco Use    Smoking status: Former     Current packs/day: 0.00     Average packs/day: 1 pack/day for 40.0 years (40.0 ttl pk-yrs)     Types: Cigarettes     Start date:      Quit date:      Years since quittin.4     Passive exposure: Past    Smokeless tobacco: Never   Vaping Use    Vaping status: Never Used   Substance and Sexual Activity    Alcohol use: No    Drug use: No    Sexual activity: Defer        Family History   Problem Relation Age of Onset    Bone cancer Mother     No Known Problems Father     Heart disease Daughter         Medications:     Current Facility-Administered Medications:     sennosides-docusate (PERICOLACE) 8.6-50 MG per tablet 2 tablet, 2 tablet, Oral, BID PRN **AND** polyethylene glycol (MIRALAX) packet 17 g, 17 g, Oral, Daily PRN **AND** bisacodyl (DULCOLAX) EC tablet 5 mg, 5 mg, Oral, Daily PRN **AND** bisacodyl (DULCOLAX) suppository 10 mg, 10 mg, Rectal, Daily PRN, Star Mckeon MD    calcium carbonate (TUMS) chewable tablet 500 mg (200  mg elemental), 2 tablet, Oral, BID PRN, Star Mckeon MD    diphenhydrAMINE (BENADRYL) injection 12.5 mg, 12.5 mg, Intravenous, Q6H PRN, Star Mckeon MD    haloperidol lactate (HALDOL) injection 1 mg, 1 mg, Intravenous, Q4H, Star Mckeon MD    HYDROmorphone (DILAUDID) injection 2 mg, 2 mg, Intravenous, Q4H, Star Mckeon MD    HYDROmorphone (DILAUDID) injection 2 mg, 2 mg, Intravenous, Q1H PRN, Star Mckeon MD    LORazepam (ATIVAN) injection 2 mg, 2 mg, Intravenous, Q1H PRN, Star Mckeon MD, 2 mg at 05/29/25 0958    LORazepam (ATIVAN) injection 2 mg, 2 mg, Intravenous, Q4H, Star Mckeon MD, 2 mg at 05/29/25 0833    ondansetron ODT (ZOFRAN-ODT) disintegrating tablet 4 mg, 4 mg, Oral, Q6H PRN **OR** ondansetron (ZOFRAN) injection 4 mg, 4 mg, Intravenous, Q6H PRN, Star Mckeon MD    sodium chloride 0.9 % flush 10 mL, 10 mL, Intravenous, PRN, Star Mckeon MD    sodium chloride 0.9 % flush 10 mL, 10 mL, Intravenous, PRN, Star Mckeon MD    sodium chloride 0.9 % infusion 40 mL, 40 mL, Intravenous, PRN, Star Mckeon MD     Allergies:   Allergies   Allergen Reactions    Sulfa Antibiotics Unknown - High Severity     Pt says it was a long time ago and I don't remember but it wasn't good.         Physical Assessment:   BP (!) 185/79 (BP Location: Right arm, Patient Position: Lying)   Pulse 79   Temp 97.3 °F (36.3 °C) (Axillary)   Resp 18   SpO2 92%    Palliative Performance Scale: 10%  Physical Exam   Elderly appearing female resting in bed, unresponsive to gentle stimuli, no acute distress. Regular cardiac rhythm. Intact peripheral pulses. Diminished bowel sounds. Lower extremity dressings intact. Not acutely restless or agitated at time of my visit      Data Reviewed:   Lab Results   Component Value Date    GLUCOSE 86 05/25/2025    BUN 71 (H) 05/25/2025    CREATININE 4.67 (H) 05/25/2025     05/25/2025    K 4.9 05/25/2025     (H) 05/25/2025    CALCIUM 7.8  "(L) 05/25/2025    PROTEINTOT 8.0 05/23/2025    ALBUMIN 3.1 (L) 05/23/2025    ALT 6 05/23/2025    AST 12 05/23/2025    ALKPHOS 126 (H) 05/23/2025    BILITOT 0.2 05/23/2025    GLOB 4.9 05/23/2025    AGRATIO 0.6 05/23/2025    BCR 15.2 05/25/2025    ANIONGAP 13.8 05/25/2025    EGFR 8.8 (L) 05/25/2025       No results found for: \"WBC\", \"RBC\", \"HGB\", \"HCT\", \"MCV\", \"MCH\", \"MCHC\", \"RDW\", \"RDWSD\", \"MPV\", \"PLT\", \"NEUTRORELPCT\", \"LYMPHORELPCT\", \"MONORELPCT\", \"EOSRELPCT\", \"BASORELPCT\", \"AUTOIGPER\", \"NEUTROABS\", \"LYMPHSABS\", \"MONOSABS\", \"EOSABS\", \"BASOSABS\", \"AUTOIGNUM\", \"NRBC\"       US Renal Bilateral  US RENAL BILATERAL-     Clinical: Acute renal insufficiency     COMPARISON 4/30/2024     FINDINGS: The right kidney measures 10.1 cm in length and the left  kidney measures 10.8 cm in length. Renal cortical echotexture is greater  than anticipated consistent with medical renal disease. No  hydronephrosis on the right or left. Bilateral renal cysts again  demonstrated with the largest on the right measuring 2.3 cm in maximum  diameter and the largest on the left measuring 3.8 cm in maximum  diameter. No renal calculus. Limited images of the bladder fail to  demonstrate either ureteral jet. Small amount of suspended debris is  suggested within the bladder lumen.     CONCLUSION: Renal cortical echotexture consistent with medical renal  disease, no hydronephrosis. Bilateral renal cysts. Neither ureteral jet  could be documented.     This report was finalized on 5/9/2025 7:28 PM by Dr. Eduardo Lopez M.D  on Workstation: [id number]     Adult Transthoracic Echo Complete W/ Cont if Necessary Per Protocol    Left ventricular ejection fraction appears to be 56 - 60%.    Left ventricular diastolic function was normal.        Assessment and Plan: Halie Guidry is a 84 y.o.  female with a primary hospice diagnosis of peripheral vascular disease admitted to [facility] hospice level of care for management of pain and anxiety.      Prognosis: " PPS 10%  Goals of Care Treatment Preferences   Palliative Care Decision Making Capacity: unreliable  Healthcare Surrogate: Per legal succession  What is Most important to patient/family at this time: unable to say  Code Status DNR  Symptom Management:   Pain: In setting of PRN needs, increase routine dilaudid to 2mg q4h routine with PRN available  Shortness of Breath: Opioids mainstay as per above  Anxiety: Adjust lorazepam 2mg q4h routine and q1h PRN  Agitation: Initiate Haldol 1mg q4h routine and q1h PRN  Constipation: PRN PO/MO regimen  Patient is at high risk of decline and death   Patient requires [facility] hospice level of care for management of Pain, anxiety, restlessness that can't be controlled or managed at a lower level of care   Disposition: Admit to [facility], anticipate EOL during this stay  Discussed plan with: RN    I spent a total of 30 minutes today caring for patient including reviewing records, speaking with patient/family and collaborating with care team.     If you need to reach the provider on call for the hospice team please call [phone]    Star Mckeon MD  5/29/2025 12:53 EDT

## 2025-05-29 NOTE — PLAN OF CARE
Goal Outcome Evaluation:           Progress: declining  Outcome Evaluation: Patient slept throughout the night.  PPS 20%.  Did not require any PRN medications this shift.  Made HSB on dayshift.  Contact precautions remain inplace.  Nursing will continue plan of care.

## 2025-05-29 NOTE — DISCHARGE SUMMARY
Patient Name: Halie Guidry  : 1940  MRN: [id number]    Date of Admission: 2025  Date of Discharge:  2025  Primary Care Physician: Provider, No Known      Hospital Course     Chief Complaint:   Leg Swelling      Active Hospital Problems    Diagnosis  POA    **Cellulitis [L03.90]  Yes    Leg pain [M79.606]  Yes    CKD (chronic kidney disease) stage 4, GFR 15-29 ml/min [N18.4]  Yes    Anemia due to chronic kidney disease [N18.9, D63.1]  Yes    Primary malignant neuroendocrine tumor of pancreas [C7A.8]  Yes    Acute kidney injury [N17.9]  Yes    Type 2 diabetes mellitus with hyperglycemia, with long-term current use of insulin [E11.65, Z79.4]  Not Applicable    Benign essential hypertension [I10]  Yes      Resolved Hospital Problems   No resolved problems to display.        Hospital Course:  Ms. Guidry is a 84 y.o. female who presented to [facility] initially complaining of worsening bilateral lower extremity pain that has been constant and progressive in nature. Please see the admitting history and physical for further details. She was admitted to the hospital for further evaluation and treatment. Following arrival, patient was noted to have leukocytosis. Prior arterial duplex from  was reviewed, which showed severely reduced WILLIS on left and right, waveforms consistent with aorto-iliac disease, severe digital ischemia. She was also noted to have reported purulent drainage from lower extremity wounds. She was placed on broad spectrum antibiotics. Vascular surgery was consulted to evaluate, discussed with patient and she stated that she would not want any type of surgical intervention. Additionally, she was noted to have VIPUL on CKD following arrival, Nephrology consulted and followed--discussed with patient and she expressed that she would not want to pursue dialysis if this became warranted. Hemoglobin was also noted to be low during hospital stay. Palliative care was  "consulted, goals of care were discussed and patient and family expressed wishes to transition to comfort measures only. She was transferred to the palliative care floor and evaluated by [facility] and she has met criteria for admission to [facility] scattered bed status.      Consultants     Palliative Care  [facility]  Vascular Surgery  Nephrology    Discharge Details     Inter-facility transfer medications (From admission, onward)               pregabalin (LYRICA) capsule 25 mg  2 Times Daily,   Status:  Canceled             calcium carbonate (TUMS) chewable tablet 500 mg (200 mg elemental)  2 Times Daily PRN,   Status:  Canceled             diphenhydrAMINE (BENADRYL) injection 12.5 mg  Every 6 Hours PRN,   Status:  Canceled             HYDROmorphone (DILAUDID) injection 1.5 mg  Every 1 Hour PRN,   Status:  Canceled             LORazepam (ATIVAN) injection 2 mg  Every 1 Hour PRN,   Status:  Canceled             ondansetron ODT (ZOFRAN-ODT) disintegrating tablet 4 mg  (ondansetron (ZOFRAN) IV - Oral COR / GAYLE / KETAN / LAG / NAY / MAD / PAD / YANN / BR SC)  Every 6 Hours PRN,   Status:  Canceled        Placed in \"Or\" Linked Group        ondansetron (ZOFRAN) injection 4 mg  (ondansetron (ZOFRAN) IV - Oral COR / GAYLE / KETAN / LAG / NAY / MAD / PAD / YANN / BR SC)  Every 6 Hours PRN,   Status:  Canceled        Placed in \"Or\" Linked Group        sennosides-docusate (PERICOLACE) 8.6-50 MG per tablet 2 tablet  (Bowel Management Regimen - All Meds prn)  2 Times Daily PRN,   Status:  Canceled        Placed in \"And\" Linked Group        polyethylene glycol (MIRALAX) packet 17 g  (Bowel Management Regimen - All Meds prn)  Daily PRN,   Status:  Canceled        Placed in \"And\" Linked Group        bisacodyl (DULCOLAX) EC tablet 5 mg  (Bowel Management Regimen - All Meds prn)  Daily PRN,   Status:  Canceled        Placed in \"And\" Linked Group        bisacodyl (DULCOLAX) suppository 10 mg  (Bowel Management Regimen - All Meds prn)  Daily PRN, " "  Status:  Canceled        Placed in \"And\" Linked Group        sodium chloride 0.9 % flush 10 mL  (Insert & Maintain IV)  As Needed,   Status:  Canceled             sodium chloride 0.9 % infusion 40 mL  (Insert & Maintain IV)  As Needed,   Status:  Canceled             sodium chloride 0.9 % flush 10 mL  (Insert Peripheral IV)  As Needed,   Status:  Canceled             HYDROmorphone (DILAUDID) injection 1.5 mg  Every 4 Hours,   Status:  Canceled             LORazepam (ATIVAN) injection 2 mg  Every 4 Hours,   Status:  Canceled                            Allergies   Allergen Reactions    Sulfa Antibiotics Unknown - High Severity     Pt says it was a long time ago and I don't remember but it wasn't good.        Discharge Disposition:  [facility] Scattered Bed      CODE STATUS  Comfort Care Only      Time Spent on Discharge:  Greater than 30 minutes  Electronically signed by Zachariah Ivory DO, 5/28/2025    "

## 2025-05-29 NOTE — PROGRESS NOTES
Pt was unresponsive.  No family present.  Hospars  spoke with pt's son Derrick per phone.  Pt is [employer] but currently not involved in [employer].  Son did not express any need for anointing for his mom.   provided prayer of closure and blessing at bed-side while pt's son was on the phone.   expressed sadness yet acceptance with pt's prognosis and POC.

## 2025-05-29 NOTE — PROGRESS NOTES
SB team SW met with patient to explain role of SB team SW and assess for needs. Pt was lying in her hospital bed, unresponsive with no signs of pain or discomfort.  No family at the bedside. SW called son to offer support and unable to reach. SW left a VM and awaiting a return call. SW unable to complete PHQ9 due to patient being unresponsive.  SW unable to obtain  Home information or complete bereavement risk assessment. SW will visit on a weekly and PRN basis to offer EOL support and assist with needs.

## 2025-05-29 NOTE — PLAN OF CARE
Goal Outcome Evaluation:  Plan of Care Reviewed With: patient        Progress: declining  Outcome Evaluation: PPS 10%. Pt bedbound, no p.o intake. Pt was very tense, restless and anxious at beginning of shift. She made several attempts to get out of bed and would not lay down, continuously sitting up on her elbows and moaning. Scheduled medications as well as PRN medications used for symptom control. Collab with [facility] team and med changes made. Pt has been resting comfortably throughout the rest of the day. o2 2lpm per n/c. F/C to bsd. Catarino le wrapped and weeping. Contact isolation for VRE. Nsg to cont with plan of care.

## 2025-05-30 NOTE — CASE MANAGEMENT/SOCIAL WORK
Case Management Discharge Note      Final Note:  on 2025         Selected Continued Care - Discharged on 2025 Admission date: 2025 - Discharge disposition:       Destination Coordination complete.      Service Provider Services Address Phone Fax Patient Preferred    [facility] [address] [facility] [address] [phone] --              Durable Medical Equipment    No services have been selected for the patient.                Dialysis/Infusion    No services have been selected for the patient.                Home Medical Care    No services have been selected for the patient.                Therapy    No services have been selected for the patient.                Community Resources    No services have been selected for the patient.                Community & DME    No services have been selected for the patient.                    Selected Continued Care - Episodes Includes continued care and service providers with selected services from the active episodes listed below          Selected Continued Care - Prior Encounters Includes continued care and service providers with selected services from prior encounters from 2025 to 2025      Discharged on 2025 Admission date: 2025 - Discharge disposition: Hospice/Medical Facility ([facility])      Destination       Service Provider Services Address Phone Fax Patient Preferred    [facility] [address] [facility] [address] [phone] --                      Discharged on 2025 Admission date: 2025 - Discharge disposition: Home or Self Care      Home Medical Care       Service Provider Services Address Phone Fax Patient Preferred    [facility] [address] [facility], [address], [facility] [address] [phone] --                                Final Discharge Disposition Code: 20 -

## 2025-05-30 NOTE — DISCHARGE SUMMARY
[facility] Death Summary  Halie Guidry   1940   [facility] Hospice Admission Date: 2025   Date of Death: 25  Attending Provider: No att. providers found      Cause of Death:   Peripheral vascular disease / ischemia    Secondary Diagnosis:  Past Medical History:   Diagnosis Date    Acute metabolic encephalopathy 2022    Anxiety     Arthritis     Benign essential hypertension 2014    Bleeding disorder     Depression     Diabetes     Diabetes mellitus, type 2     Disc degeneration, lumbar     Headache, tension-type     Hyperlipidemia     Hypothyroidism     Neuropathy     Osteoporosis 2015    Pancreatic mass     2023, on Monthly Octreotide Injection    Peripheral neuropathy     Rotator cuff tear, left     Scoliosis     Shoulder pain     LEFT, TORN ROTATOR CUFF S/P FALL    Spinal stenosis         Hospital Course:     Halie Guidry is a 84 y.o. female with a primary hospice diagnosis of peripheral vascular disease / ischemia who was admitted to [facility] level of Hospice Care at [facility]. Symptoms were managed with IV medications. She declined and  on 25.      Star Mckeon MD  2025 13:56 EDT

## 2025-07-30 ENCOUNTER — TELEPHONE (OUTPATIENT)
Dept: FAMILY MEDICINE CLINIC | Facility: CLINIC | Age: 85
End: 2025-07-30
Payer: MEDICARE

## 2025-07-30 NOTE — TELEPHONE ENCOUNTER
----- Message from Carley BREWSTER sent at 2025  4:28 PM EDT -----  Regarding: FW: DME COMPANY  NO INFO FROM THIS COMPANY  ----- Message -----  From: Beena Joe RegSched Rep  Sent: 2025   3:54 PM EDT  To: [facility]; [facility]  Subject: FW: DME COMPANY                                    ----- Message -----  From: Giovanna Gee MA  Sent: 2025   3:40 PM EDT  To: Jaylen Julien  Subject: FW: DME COMPANY                                  Message sent to wrong officew  ----- Message -----  From: Beena Joe RegSched Rep  Sent: 2025   3:35 PM EDT  To: [facility]; [facility]  Subject: DME COMPANY                                      [facility] AYESHA ZAVALA NEEDING DR SILVA TO SIGN ORDER THAT WAS FAXED OVER 25     HUB CAN'T ACCESS CHART DUE TO      PHONE  EXT [phone]

## 2025-07-30 NOTE — TELEPHONE ENCOUNTER
I called back and left a message asking them to call me back. I need to know what the date of service is, since the patient has passed.

## 2025-07-30 NOTE — TELEPHONE ENCOUNTER
[facility] is requesting order from May to be signed for billing purposes. This order is to be back dated r/t pt is . Company is faxing again attn; Giuliana

## 2025-07-30 NOTE — TELEPHONE ENCOUNTER
----- Message from Dia ORTIZ sent at 2025  4:28 PM EDT -----  Regarding: FW: DME COMPANY  Sent to incorrect pool.  ----- Message -----  From: Beena Joe RegSched Rep  Sent: 2025   3:35 PM EDT  To: [facility]; [facility]  Subject: DME COMPANY                                      [facility] AYESHA ZAVALA NEEDING DR SILVA TO SIGN ORDER THAT WAS FAXED OVER 25     HUB CAN'T ACCESS CHART DUE TO      PHONE  EXT [phone]

## (undated) DEVICE — Device

## (undated) DEVICE — DRP C/ARM 41X74IN

## (undated) DEVICE — ENDOCUT SCISSOR TIP, DISPOSABLE: Brand: RENEW

## (undated) DEVICE — VISUALIZATION SYSTEM: Brand: CLEARIFY

## (undated) DEVICE — SPHINCTEROTOME: Brand: HYDRATOME RX 44

## (undated) DEVICE — DEV LK WIREGUIDE FUSN OLYMP SCP

## (undated) DEVICE — ENDOPATH XCEL BLADELESS TROCARS WITH STABILITY SLEEVES: Brand: ENDOPATH XCEL

## (undated) DEVICE — ENDOPATH XCEL BLUNT TIP TROCARS WITH SMOOTH SLEEVES: Brand: ENDOPATH XCEL

## (undated) DEVICE — ADHS SKIN SURG TISS VISC PREMIERPRO EXOFIN HI/VISC FAST/DRY

## (undated) DEVICE — ADAPT CLN BIOGUARD AIR/H2O DISP

## (undated) DEVICE — SUT VIC 0/0 UR6 27IN DYED J603H

## (undated) DEVICE — ENDOPATH XCEL UNIVERSAL TROCAR STABLILITY SLEEVES: Brand: ENDOPATH XCEL

## (undated) DEVICE — CANN O2 ETCO2 FITS ALL CONN CO2 SMPL A/ 7IN DISP LF

## (undated) DEVICE — KT ORCA ORCAPOD DISP STRL

## (undated) DEVICE — EXTENSION SET, MALE LUER LOCK ADAPTER WITH RETRACTABLE COLLAR

## (undated) DEVICE — TUBING, SUCTION, 1/4" X 10', STRAIGHT: Brand: MEDLINE

## (undated) DEVICE — SENSR O2 OXIMAX FNGR A/ 18IN NONSTR

## (undated) DEVICE — ERBE NESSY®PLATE 170 SPLIT; 168CM²; CABLE 3M: Brand: ERBE

## (undated) DEVICE — STPCK 3WY D201 DISCOFIX

## (undated) DEVICE — CONTAINER,SPECIMEN,OR STERILE,4OZ: Brand: MEDLINE

## (undated) DEVICE — BITEBLOCK OMNI BLOC

## (undated) DEVICE — ANTIBACTERIAL UNDYED BRAIDED (POLYGLACTIN 910), SYNTHETIC ABSORBABLE SUTURE: Brand: COATED VICRYL

## (undated) DEVICE — APPL CHLORAPREP HI/LITE 26ML ORNG

## (undated) DEVICE — DRAPE,REIN 53X77,STERILE: Brand: MEDLINE

## (undated) DEVICE — LN SMPL CO2 SHTRM SD STREAM W/M LUER

## (undated) DEVICE — ENDOPOUCH RETRIEVER SPECIMEN RETRIEVAL BAGS: Brand: ENDOPOUCH RETRIEVER

## (undated) DEVICE — CATH IV INSYTE AUTOGARD 14G 1 1/2IN ORNG

## (undated) DEVICE — TRIPLE LUMEN NEEDLE KNIFE: Brand: RX NEEDLE KNIFE XL

## (undated) DEVICE — CATH CHOLANG 4.5F18IN BRGNDY

## (undated) DEVICE — LOU LAP CHOLE: Brand: MEDLINE INDUSTRIES, INC.

## (undated) DEVICE — GLV SURG BIOGEL LTX PF 6 1/2

## (undated) DEVICE — JACKSON-PRATT 100CC BULB RESERVOIR: Brand: CARDINAL HEALTH

## (undated) DEVICE — LAPAROSCOPIC SMOKE FILTRATION SYSTEM: Brand: PALL LAPAROSHIELD® PLUS LAPAROSCOPIC SMOKE FILTRATION SYSTEM

## (undated) DEVICE — RETRIEVAL BALLOON CATHETER: Brand: EXTRACTOR™ PRO RX